# Patient Record
Sex: FEMALE | Race: WHITE | NOT HISPANIC OR LATINO | URBAN - METROPOLITAN AREA
[De-identification: names, ages, dates, MRNs, and addresses within clinical notes are randomized per-mention and may not be internally consistent; named-entity substitution may affect disease eponyms.]

---

## 2018-03-23 ENCOUNTER — EMERGENCY (EMERGENCY)
Facility: HOSPITAL | Age: 38
LOS: 0 days | Discharge: HOME | End: 2018-03-24
Attending: EMERGENCY MEDICINE | Admitting: EMERGENCY MEDICINE
Payer: SELF-PAY

## 2018-03-23 VITALS
TEMPERATURE: 98 F | DIASTOLIC BLOOD PRESSURE: 84 MMHG | WEIGHT: 115.08 LBS | RESPIRATION RATE: 19 BRPM | HEART RATE: 83 BPM | OXYGEN SATURATION: 100 % | HEIGHT: 64 IN | SYSTOLIC BLOOD PRESSURE: 139 MMHG

## 2018-03-23 DIAGNOSIS — L02.414 CUTANEOUS ABSCESS OF LEFT UPPER LIMB: ICD-10-CM

## 2018-03-23 PROCEDURE — 99284 EMERGENCY DEPT VISIT MOD MDM: CPT | Mod: 25

## 2018-03-23 PROCEDURE — 23930 I&D UPR A/E DP ABSC/HMTMA: CPT | Mod: LT

## 2018-03-23 NOTE — ED ADULT NURSE NOTE - CHIEF COMPLAINT QUOTE
Pt reports to ED complaining of left arm swelling and redness. Pt reports that it began Tuesday and has been getting worse since then.

## 2018-03-24 RX ORDER — CEPHALEXIN 500 MG
1 CAPSULE ORAL
Qty: 20 | Refills: 0
Start: 2018-03-24 | End: 2018-03-28

## 2018-03-24 RX ORDER — AZTREONAM 2 G
1 VIAL (EA) INJECTION
Qty: 10 | Refills: 0
Start: 2018-03-24 | End: 2018-03-28

## 2018-03-24 NOTE — ED PROVIDER NOTE - SKIN, MLM
+ erythematous hot 4  x 4 cm abscess post left forearm no crepitus no sign compartment syncdrome fluctant

## 2018-03-24 NOTE — ED PROVIDER NOTE - OBJECTIVE STATEMENT
pt presents with left forearm swelling erythmatous abscess x 3 days nrom affted arm and digitis she is intermittent ivda . no prior tx. denies fever. denies HA or neck pain. no chest pain or sob. no abd pain. no n/v/d. no urinary f/u/d. no back pain. no motor or sensory deficits. + illicit drug use. no recent travel. + abscess. no other acute issues symptoms or concerns

## 2018-04-29 ENCOUNTER — EMERGENCY (EMERGENCY)
Facility: HOSPITAL | Age: 38
LOS: 0 days | Discharge: LEFT BEFORE DISCHARGE | End: 2018-04-29
Attending: EMERGENCY MEDICINE | Admitting: EMERGENCY MEDICINE
Payer: SELF-PAY

## 2018-04-29 VITALS
TEMPERATURE: 98 F | WEIGHT: 115.08 LBS | SYSTOLIC BLOOD PRESSURE: 185 MMHG | DIASTOLIC BLOOD PRESSURE: 115 MMHG | HEART RATE: 61 BPM | RESPIRATION RATE: 20 BRPM | OXYGEN SATURATION: 100 %

## 2018-04-29 VITALS
DIASTOLIC BLOOD PRESSURE: 102 MMHG | HEART RATE: 60 BPM | SYSTOLIC BLOOD PRESSURE: 145 MMHG | OXYGEN SATURATION: 100 % | RESPIRATION RATE: 18 BRPM

## 2018-04-29 DIAGNOSIS — R07.9 CHEST PAIN, UNSPECIFIED: ICD-10-CM

## 2018-04-29 DIAGNOSIS — Z98.51 TUBAL LIGATION STATUS: Chronic | ICD-10-CM

## 2018-04-29 DIAGNOSIS — F17.210 NICOTINE DEPENDENCE, CIGARETTES, UNCOMPLICATED: ICD-10-CM

## 2018-04-29 LAB
BASOPHILS # BLD AUTO: 0.02 K/UL — SIGNIFICANT CHANGE UP (ref 0–0.2)
BASOPHILS NFR BLD AUTO: 0.2 % — SIGNIFICANT CHANGE UP (ref 0–2)
EOSINOPHIL # BLD AUTO: 0.04 K/UL — SIGNIFICANT CHANGE UP (ref 0–0.5)
EOSINOPHIL NFR BLD AUTO: 0.4 % — SIGNIFICANT CHANGE UP (ref 0–6)
HCT VFR BLD CALC: 44.9 % — SIGNIFICANT CHANGE UP (ref 34.5–45)
HGB BLD-MCNC: 15.5 G/DL — SIGNIFICANT CHANGE UP (ref 11.5–15.5)
IMM GRANULOCYTES NFR BLD AUTO: 0.3 % — SIGNIFICANT CHANGE UP (ref 0–1.5)
LYMPHOCYTES # BLD AUTO: 1.15 K/UL — SIGNIFICANT CHANGE UP (ref 1–3.3)
LYMPHOCYTES # BLD AUTO: 11 % — LOW (ref 13–44)
MCHC RBC-ENTMCNC: 29.4 PG — SIGNIFICANT CHANGE UP (ref 27–34)
MCHC RBC-ENTMCNC: 34.5 GM/DL — SIGNIFICANT CHANGE UP (ref 32–36)
MCV RBC AUTO: 85 FL — SIGNIFICANT CHANGE UP (ref 80–100)
MONOCYTES # BLD AUTO: 0.63 K/UL — SIGNIFICANT CHANGE UP (ref 0–0.9)
MONOCYTES NFR BLD AUTO: 6 % — SIGNIFICANT CHANGE UP (ref 2–14)
NEUTROPHILS # BLD AUTO: 8.58 K/UL — HIGH (ref 1.8–7.4)
NEUTROPHILS NFR BLD AUTO: 82.1 % — HIGH (ref 43–77)
NRBC # BLD: 0 /100 WBCS — SIGNIFICANT CHANGE UP (ref 0–0)
PLATELET # BLD AUTO: 303 K/UL — SIGNIFICANT CHANGE UP (ref 150–400)
RBC # BLD: 5.28 M/UL — HIGH (ref 3.8–5.2)
RBC # FLD: 13.5 % — SIGNIFICANT CHANGE UP (ref 10.3–14.5)
WBC # BLD: 10.45 K/UL — SIGNIFICANT CHANGE UP (ref 3.8–10.5)
WBC # FLD AUTO: 10.45 K/UL — SIGNIFICANT CHANGE UP (ref 3.8–10.5)

## 2018-04-29 PROCEDURE — 93010 ELECTROCARDIOGRAM REPORT: CPT

## 2018-04-29 PROCEDURE — 71046 X-RAY EXAM CHEST 2 VIEWS: CPT | Mod: 26

## 2018-04-29 PROCEDURE — 99284 EMERGENCY DEPT VISIT MOD MDM: CPT

## 2018-04-29 RX ORDER — HYDROMORPHONE HYDROCHLORIDE 2 MG/ML
1 INJECTION INTRAMUSCULAR; INTRAVENOUS; SUBCUTANEOUS ONCE
Qty: 0 | Refills: 0 | Status: DISCONTINUED | OUTPATIENT
Start: 2018-04-29 | End: 2018-04-29

## 2018-04-29 RX ORDER — ASPIRIN/CALCIUM CARB/MAGNESIUM 324 MG
325 TABLET ORAL ONCE
Qty: 0 | Refills: 0 | Status: COMPLETED | OUTPATIENT
Start: 2018-04-29 | End: 2018-04-29

## 2018-04-29 RX ORDER — SODIUM CHLORIDE 9 MG/ML
1000 INJECTION INTRAMUSCULAR; INTRAVENOUS; SUBCUTANEOUS ONCE
Qty: 0 | Refills: 0 | Status: COMPLETED | OUTPATIENT
Start: 2018-04-29 | End: 2018-04-29

## 2018-04-29 RX ORDER — OXYCODONE AND ACETAMINOPHEN 5; 325 MG/1; MG/1
1 TABLET ORAL ONCE
Qty: 0 | Refills: 0 | Status: DISCONTINUED | OUTPATIENT
Start: 2018-04-29 | End: 2018-04-29

## 2018-04-29 RX ADMIN — HYDROMORPHONE HYDROCHLORIDE 1 MILLIGRAM(S): 2 INJECTION INTRAMUSCULAR; INTRAVENOUS; SUBCUTANEOUS at 13:03

## 2018-04-29 RX ADMIN — HYDROMORPHONE HYDROCHLORIDE 1 MILLIGRAM(S): 2 INJECTION INTRAMUSCULAR; INTRAVENOUS; SUBCUTANEOUS at 12:01

## 2018-04-29 RX ADMIN — SODIUM CHLORIDE 1000 MILLILITER(S): 9 INJECTION INTRAMUSCULAR; INTRAVENOUS; SUBCUTANEOUS at 13:30

## 2018-04-29 RX ADMIN — HYDROMORPHONE HYDROCHLORIDE 1 MILLIGRAM(S): 2 INJECTION INTRAMUSCULAR; INTRAVENOUS; SUBCUTANEOUS at 15:16

## 2018-04-29 RX ADMIN — HYDROMORPHONE HYDROCHLORIDE 1 MILLIGRAM(S): 2 INJECTION INTRAMUSCULAR; INTRAVENOUS; SUBCUTANEOUS at 15:25

## 2018-04-29 RX ADMIN — Medication 325 MILLIGRAM(S): at 09:50

## 2018-04-29 RX ADMIN — HYDROMORPHONE HYDROCHLORIDE 1 MILLIGRAM(S): 2 INJECTION INTRAMUSCULAR; INTRAVENOUS; SUBCUTANEOUS at 12:38

## 2018-04-29 NOTE — ED ADULT NURSE REASSESSMENT NOTE - NS ED NURSE REASSESS COMMENT FT1
Laboratory confirms that IV RN gave bloodwork to phlebotomy.  Laboratory confirms that pt will be redrawn by phlebotomy at this time.  MD aware.

## 2018-04-29 NOTE — ED ADULT NURSE NOTE - OBJECTIVE STATEMENT
Pt reports waking up with the feeling of chest pain/pressure. Pt denies any recent fever/chills. Reports recent dental work. Pt offered ordered pain medication, but reports that percocet will not work for this pain and requests different medication. MD notified and will admin as ordered. Pt lab work sent, but IVL difficult to obtain. Second RN attempting IV placement at this time.

## 2018-04-29 NOTE — ED ADULT NURSE REASSESSMENT NOTE - NS ED NURSE REASSESS COMMENT FT1
Laboratory called back to say pt was being drawn at the bedside. Pt states that she had repeat labs drawn. Laboratory called to check, on progress and will call back with information to RN.

## 2018-04-29 NOTE — ED ADULT NURSE REASSESSMENT NOTE - NS ED NURSE REASSESS COMMENT FT1
MD also aware of pt's hypertension with pt also verbalizing awareness of htn. Pt cont to say that she is unable to stay and will leave AMA understanding the risks related to leaving prior to full evaluation and assessment.  Pt states she will f/u with endocrinologist and PMD tomorrow.

## 2018-04-29 NOTE — ED PROVIDER NOTE - PROGRESS NOTE DETAILS
Jamie Novak DO (Attending): received patient as sign out, awaiting lab results.  States she wants to go home and called a ride.  Discussed r/b/a including risk to health, disability, and death.  Expressed understanding will DC AMA Jamie Novak DO (Attending): received patient as sign out, awaiting lab results.  States she wants to go home and called a ride.  Lab called about results, states they never received any specimens.  Patient states blood was drawn 3 times today and yet lab states they still do not have a sample.  Discussed r/b/a including risk to health, disability, and death.  Expressed understanding will COY KAYE

## 2018-04-29 NOTE — ED PROVIDER NOTE - OBJECTIVE STATEMENT
38 y/o female with PMHx of Lupus, s/p tubal ligation heavy CP radiating to neck x1 hr. No prior episodes. No cardiac hx. Smoker. No EtOH. No illicit drugs. Cardiac FHx; mother cardiomyopathy under 50. Denies heavy lifting, trauma, recent travel. No aggravating, relieving factors. Did not take ASA PTA. PCP Brea Beal.

## 2018-04-29 NOTE — ED ADULT TRIAGE NOTE - CHIEF COMPLAINT QUOTE
Pt complains of chest pain, states it "feels like my heart is pounding out of my throat and like somebody's sitting on my chest," x 1 hour.   BP elevated on arrival.  Pt reports 4 teeth extracted over past several days.

## 2018-04-29 NOTE — ED ADULT NURSE REASSESSMENT NOTE - NS ED NURSE REASSESS COMMENT FT1
Several RN's to bedside with unsuccessful IV attempt with MD aware. IV team able to get IV with sono with labs sent.  Pt medicated for pain and will monitor for relief.

## 2018-04-29 NOTE — ED ADULT NURSE REASSESSMENT NOTE - NS ED NURSE REASSESS COMMENT FT1
MD notified that pt needs to leave at this time. MD spoke with pt regarding risks of leaving AMA and pt verbalizes understanding. MD notified that laboratory states that there is no current bloodwork that can be resulted on patient. Pt notified that there is no bloodwork that will be able to resulted. Pt states she is unable to wait for redraw or results and verbalizes understanding that she will have no bloodwork results if she leaves AMA. MD spoke with pt regarding the laboratory being unable to result any bloodwork.  Pt continues to verbalize understanding of risks related to leaving AMA with MD to bedside to speak with pt. Pt ambulating with steady gait, resp even and unlabored, and pt reports mild relief with last medication. Pt IVL removed.

## 2020-06-23 ENCOUNTER — INPATIENT (INPATIENT)
Facility: HOSPITAL | Age: 40
LOS: 37 days | Discharge: NURSING, REHAB OR LONG-TERM CARE | DRG: 4 | End: 2020-07-31
Attending: INTERNAL MEDICINE | Admitting: HOSPITALIST
Payer: MEDICAID

## 2020-06-23 VITALS
RESPIRATION RATE: 18 BRPM | SYSTOLIC BLOOD PRESSURE: 112 MMHG | DIASTOLIC BLOOD PRESSURE: 73 MMHG | HEART RATE: 122 BPM | HEIGHT: 65 IN | TEMPERATURE: 100 F | WEIGHT: 119.93 LBS | OXYGEN SATURATION: 100 %

## 2020-06-23 DIAGNOSIS — L03.113 CELLULITIS OF RIGHT UPPER LIMB: ICD-10-CM

## 2020-06-23 DIAGNOSIS — R64 CACHEXIA: ICD-10-CM

## 2020-06-23 DIAGNOSIS — R53.2 FUNCTIONAL QUADRIPLEGIA: ICD-10-CM

## 2020-06-23 DIAGNOSIS — J80 ACUTE RESPIRATORY DISTRESS SYNDROME: ICD-10-CM

## 2020-06-23 DIAGNOSIS — R19.00 INTRA-ABDOMINAL AND PELVIC SWELLING, MASS AND LUMP, UNSPECIFIED SITE: ICD-10-CM

## 2020-06-23 DIAGNOSIS — E86.0 DEHYDRATION: ICD-10-CM

## 2020-06-23 DIAGNOSIS — R65.21 SEVERE SEPSIS WITH SEPTIC SHOCK: ICD-10-CM

## 2020-06-23 DIAGNOSIS — E16.2 HYPOGLYCEMIA, UNSPECIFIED: ICD-10-CM

## 2020-06-23 DIAGNOSIS — F11.24 OPIOID DEPENDENCE WITH OPIOID-INDUCED MOOD DISORDER: ICD-10-CM

## 2020-06-23 DIAGNOSIS — M86.60 OTHER CHRONIC OSTEOMYELITIS, UNSPECIFIED SITE: ICD-10-CM

## 2020-06-23 DIAGNOSIS — E88.09 OTHER DISORDERS OF PLASMA-PROTEIN METABOLISM, NOT ELSEWHERE CLASSIFIED: ICD-10-CM

## 2020-06-23 DIAGNOSIS — G92 TOXIC ENCEPHALOPATHY: ICD-10-CM

## 2020-06-23 DIAGNOSIS — E87.2 ACIDOSIS: ICD-10-CM

## 2020-06-23 DIAGNOSIS — Z88.5 ALLERGY STATUS TO NARCOTIC AGENT: ICD-10-CM

## 2020-06-23 DIAGNOSIS — L97.928 NON-PRESSURE CHRONIC ULCER OF UNSPECIFIED PART OF LEFT LOWER LEG WITH OTHER SPECIFIED SEVERITY: ICD-10-CM

## 2020-06-23 DIAGNOSIS — L97.918 NON-PRESSURE CHRONIC ULCER OF UNSPECIFIED PART OF RIGHT LOWER LEG WITH OTHER SPECIFIED SEVERITY: ICD-10-CM

## 2020-06-23 DIAGNOSIS — Z98.51 TUBAL LIGATION STATUS: Chronic | ICD-10-CM

## 2020-06-23 DIAGNOSIS — L89.892 PRESSURE ULCER OF OTHER SITE, STAGE 2: ICD-10-CM

## 2020-06-23 DIAGNOSIS — R94.31 ABNORMAL ELECTROCARDIOGRAM [ECG] [EKG]: ICD-10-CM

## 2020-06-23 DIAGNOSIS — I73.01 RAYNAUD'S SYNDROME WITH GANGRENE: ICD-10-CM

## 2020-06-23 DIAGNOSIS — L03.114 CELLULITIS OF LEFT UPPER LIMB: ICD-10-CM

## 2020-06-23 DIAGNOSIS — T45.1X6A UNDERDOSING OF ANTINEOPLASTIC AND IMMUNOSUPPRESSIVE DRUGS, INITIAL ENCOUNTER: ICD-10-CM

## 2020-06-23 DIAGNOSIS — M06.9 RHEUMATOID ARTHRITIS, UNSPECIFIED: ICD-10-CM

## 2020-06-23 DIAGNOSIS — F32.9 MAJOR DEPRESSIVE DISORDER, SINGLE EPISODE, UNSPECIFIED: ICD-10-CM

## 2020-06-23 DIAGNOSIS — N39.0 URINARY TRACT INFECTION, SITE NOT SPECIFIED: ICD-10-CM

## 2020-06-23 DIAGNOSIS — R74.0 NONSPECIFIC ELEVATION OF LEVELS OF TRANSAMINASE AND LACTIC ACID DEHYDROGENASE [LDH]: ICD-10-CM

## 2020-06-23 DIAGNOSIS — E43 UNSPECIFIED SEVERE PROTEIN-CALORIE MALNUTRITION: ICD-10-CM

## 2020-06-23 DIAGNOSIS — G40.909 EPILEPSY, UNSPECIFIED, NOT INTRACTABLE, WITHOUT STATUS EPILEPTICUS: ICD-10-CM

## 2020-06-23 DIAGNOSIS — B37.7 CANDIDAL SEPSIS: ICD-10-CM

## 2020-06-23 DIAGNOSIS — L89.152 PRESSURE ULCER OF SACRAL REGION, STAGE 2: ICD-10-CM

## 2020-06-23 DIAGNOSIS — D69.6 THROMBOCYTOPENIA, UNSPECIFIED: ICD-10-CM

## 2020-06-23 DIAGNOSIS — F41.9 ANXIETY DISORDER, UNSPECIFIED: ICD-10-CM

## 2020-06-23 DIAGNOSIS — N18.9 CHRONIC KIDNEY DISEASE, UNSPECIFIED: ICD-10-CM

## 2020-06-23 DIAGNOSIS — L89.891 PRESSURE ULCER OF OTHER SITE, STAGE 1: ICD-10-CM

## 2020-06-23 DIAGNOSIS — A41.02 SEPSIS DUE TO METHICILLIN RESISTANT STAPHYLOCOCCUS AUREUS: ICD-10-CM

## 2020-06-23 DIAGNOSIS — F17.210 NICOTINE DEPENDENCE, CIGARETTES, UNCOMPLICATED: ICD-10-CM

## 2020-06-23 DIAGNOSIS — N17.0 ACUTE KIDNEY FAILURE WITH TUBULAR NECROSIS: ICD-10-CM

## 2020-06-23 DIAGNOSIS — D62 ACUTE POSTHEMORRHAGIC ANEMIA: ICD-10-CM

## 2020-06-23 DIAGNOSIS — E87.6 HYPOKALEMIA: ICD-10-CM

## 2020-06-23 DIAGNOSIS — Z91.120 PATIENT'S INTENTIONAL UNDERDOSING OF MEDICATION REGIMEN DUE TO FINANCIAL HARDSHIP: ICD-10-CM

## 2020-06-23 DIAGNOSIS — M32.9 SYSTEMIC LUPUS ERYTHEMATOSUS, UNSPECIFIED: ICD-10-CM
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
## 2020-06-23 DIAGNOSIS — R62.7 ADULT FAILURE TO THRIVE: ICD-10-CM

## 2020-06-23 DIAGNOSIS — G62.81 CRITICAL ILLNESS POLYNEUROPATHY: ICD-10-CM

## 2020-06-23 DIAGNOSIS — J69.0 PNEUMONITIS DUE TO INHALATION OF FOOD AND VOMIT: ICD-10-CM

## 2020-06-23 DIAGNOSIS — E87.0 HYPEROSMOLALITY AND HYPERNATREMIA: ICD-10-CM
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
## 2020-06-23 LAB
ALBUMIN SERPL ELPH-MCNC: 1.1 G/DL — LOW (ref 3.3–5)
ALP SERPL-CCNC: 359 U/L — HIGH (ref 40–120)
ALT FLD-CCNC: 37 U/L — SIGNIFICANT CHANGE UP (ref 12–78)
ANION GAP SERPL CALC-SCNC: 10 MMOL/L — SIGNIFICANT CHANGE UP (ref 5–17)
APTT BLD: 34.5 SEC — SIGNIFICANT CHANGE UP (ref 27.5–36.3)
AST SERPL-CCNC: 93 U/L — HIGH (ref 15–37)
BILIRUB SERPL-MCNC: 1.2 MG/DL — SIGNIFICANT CHANGE UP (ref 0.2–1.2)
BUN SERPL-MCNC: 54 MG/DL — HIGH (ref 7–23)
CALCIUM SERPL-MCNC: 7.9 MG/DL — LOW (ref 8.5–10.1)
CHLORIDE SERPL-SCNC: 102 MMOL/L — SIGNIFICANT CHANGE UP (ref 96–108)
CO2 SERPL-SCNC: 22 MMOL/L — SIGNIFICANT CHANGE UP (ref 22–31)
CREAT SERPL-MCNC: 2.56 MG/DL — HIGH (ref 0.5–1.3)
GLUCOSE SERPL-MCNC: 55 MG/DL — LOW (ref 70–99)
INR BLD: 1.3 RATIO — HIGH (ref 0.88–1.16)
LACTATE SERPL-SCNC: 3.9 MMOL/L — HIGH (ref 0.7–2)
POTASSIUM SERPL-MCNC: 4.6 MMOL/L — SIGNIFICANT CHANGE UP (ref 3.5–5.3)
POTASSIUM SERPL-SCNC: 4.6 MMOL/L — SIGNIFICANT CHANGE UP (ref 3.5–5.3)
PROT SERPL-MCNC: 6.1 GM/DL — SIGNIFICANT CHANGE UP (ref 6–8.3)
PROTHROM AB SERPL-ACNC: 14.5 SEC — HIGH (ref 10–12.9)
SODIUM SERPL-SCNC: 134 MMOL/L — LOW (ref 135–145)

## 2020-06-23 PROCEDURE — 71045 X-RAY EXAM CHEST 1 VIEW: CPT | Mod: 26,77

## 2020-06-23 PROCEDURE — 71045 X-RAY EXAM CHEST 1 VIEW: CPT | Mod: 26

## 2020-06-23 RX ORDER — PIPERACILLIN AND TAZOBACTAM 4; .5 G/20ML; G/20ML
3.38 INJECTION, POWDER, LYOPHILIZED, FOR SOLUTION INTRAVENOUS ONCE
Refills: 0 | Status: COMPLETED | OUTPATIENT
Start: 2020-06-23 | End: 2020-06-23

## 2020-06-23 RX ORDER — SODIUM CHLORIDE 9 MG/ML
1000 INJECTION INTRAMUSCULAR; INTRAVENOUS; SUBCUTANEOUS ONCE
Refills: 0 | Status: COMPLETED | OUTPATIENT
Start: 2020-06-23 | End: 2020-06-23

## 2020-06-23 RX ORDER — CEFTRIAXONE 500 MG/1
1000 INJECTION, POWDER, FOR SOLUTION INTRAMUSCULAR; INTRAVENOUS ONCE
Refills: 0 | Status: DISCONTINUED | OUTPATIENT
Start: 2020-06-23 | End: 2020-06-23

## 2020-06-23 RX ORDER — ACETAMINOPHEN 500 MG
650 TABLET ORAL ONCE
Refills: 0 | Status: COMPLETED | OUTPATIENT
Start: 2020-06-23 | End: 2020-06-23

## 2020-06-23 RX ORDER — VANCOMYCIN HCL 1 G
750 VIAL (EA) INTRAVENOUS ONCE
Refills: 0 | Status: COMPLETED | OUTPATIENT
Start: 2020-06-23 | End: 2020-06-23

## 2020-06-23 RX ADMIN — Medication 250 MILLIGRAM(S): at 23:31

## 2020-06-23 RX ADMIN — PIPERACILLIN AND TAZOBACTAM 200 GRAM(S): 4; .5 INJECTION, POWDER, LYOPHILIZED, FOR SOLUTION INTRAVENOUS at 22:52

## 2020-06-23 RX ADMIN — PIPERACILLIN AND TAZOBACTAM 3.38 GRAM(S): 4; .5 INJECTION, POWDER, LYOPHILIZED, FOR SOLUTION INTRAVENOUS at 23:20

## 2020-06-23 RX ADMIN — Medication 650 MILLIGRAM(S): at 22:40

## 2020-06-23 RX ADMIN — SODIUM CHLORIDE 1000 MILLILITER(S): 9 INJECTION INTRAMUSCULAR; INTRAVENOUS; SUBCUTANEOUS at 22:29

## 2020-06-23 RX ADMIN — SODIUM CHLORIDE 1000 MILLILITER(S): 9 INJECTION INTRAMUSCULAR; INTRAVENOUS; SUBCUTANEOUS at 23:30

## 2020-06-23 NOTE — ED PROVIDER NOTE - SECONDARY DIAGNOSIS.
Cellulitis, unspecified cellulitis site Anemia, unspecified type Renal insufficiency Drug abuse and dependence

## 2020-06-23 NOTE — ED PROVIDER NOTE - CONSTITUTIONAL, MLM
normal... Pale appearing, awake, alert, oriented to person, place, time/situation and in no apparent distress. Unkempt.

## 2020-06-23 NOTE — ED PROVIDER NOTE - NS_ ATTENDINGSCRIBEDETAILS _ED_A_ED_FT
I, David Rodarte MD,  performed the initial face to face bedside interview with this patient regarding history of present illness, review of symptoms and relevant past medical, social and family history.  I completed an independent physical examination.    The history, relevant review of systems, past medical and surgical history, medical decision making, and physical examination was documented by the scribe in my presence and I attest to the accuracy of the documentation.

## 2020-06-23 NOTE — ED PROCEDURE NOTE - CPROC ED INFUS LINE DETAIL1
Ultrasound guidance was used during placement. The location was identified, and the area was draped and prepped./The guidewire was recovered./The catheter was placed using sterile technique./Ultrasound guidance was used during placement./All lumen(s) aspirated and flushed without difficulty.

## 2020-06-23 NOTE — ED ADULT TRIAGE NOTE - CHIEF COMPLAINT QUOTE
Patient complaining of weakness x 3 days, fever and cough.  Hx of lupus and IVDA, last heroin use this AM.

## 2020-06-23 NOTE — ED PROVIDER NOTE - SKIN, MLM
Skin normal color for race, warm, dry and intact. No evidence of rash. BL 1+ edema to lower extremities. New and old excoriations throughout body. cellulitis to the right 5th toe. BL upper extremities wrapped in old dirty guaze

## 2020-06-23 NOTE — ED PROVIDER NOTE - CARE PLAN
Principal Discharge DX:	Sepsis, due to unspecified organism, unspecified whether acute organ dysfunction present  Secondary Diagnosis:	Cellulitis, unspecified cellulitis site Principal Discharge DX:	Sepsis, due to unspecified organism, unspecified whether acute organ dysfunction present  Secondary Diagnosis:	Cellulitis, unspecified cellulitis site  Secondary Diagnosis:	Anemia, unspecified type  Secondary Diagnosis:	Renal insufficiency  Secondary Diagnosis:	Drug abuse and dependence

## 2020-06-23 NOTE — ED PROVIDER NOTE - OBJECTIVE STATEMENT
38 y/o female with pmhx of lupus presents to the ED c/o weakness. Pt hasn't seen a dr in about 2 years. Gets frequent infections. Pt states she hasn't took her medications in three months. Last heroin use was this morning. +fever 40 y/o female with pmhx of lupus presents to the ED c/o weakness. Pt hasn't seen a dr in about 2 years. Gets frequent infections. Pt states she hasn't took her medications in three months. Past four months pt has been abusing drugs. She states she had a recent death in the family and relapsed last use: heroine this morning. +fever

## 2020-06-23 NOTE — ED ADULT NURSE NOTE - OBJECTIVE STATEMENT
pt presents to ED c/o swelling of upper and lower extremities, unable walk, generalized weakness, fever, cough, shortness of breath on exertion. pt hx lupous, RA, raynauds, epilepsy (has not had seizure in 8yrs), depression. IV drug use. wounds to R forearm, index and middle. wounds to L forearm, index and ring finger.

## 2020-06-23 NOTE — ED ADULT NURSE REASSESSMENT NOTE - NS ED NURSE REASSESS COMMENT FT1
delay in obtaining labs, IV access due to poor veins. Multiple RNs attempted to insert IV, unsuccessful. Dr. Rodarte attempted EJ twice, unsuccessful. Dr. Rodarte inserted central line

## 2020-06-23 NOTE — ED PROVIDER NOTE - PROGRESS NOTE DETAILS
rectal exam with Margarita YEE at bedside - brown stool guiaic negative (lot [address], exp 2/28/21) case d/w Aram and will admit with bridge but DNM.   pt with improved VS after IVF bolus.

## 2020-06-23 NOTE — ED ADULT NURSE NOTE - NSIMPLEMENTINTERV_GEN_ALL_ED
Implemented All Fall Risk Interventions:  [place] to call system. Call bell, personal items and telephone within reach. Instruct patient to call for assistance. Room bathroom lighting operational. Non-slip footwear when patient is off stretcher. Physically safe environment: no spills, clutter or unnecessary equipment. Stretcher in lowest position, wheels locked, appropriate side rails in place. Provide visual cue, wrist band, yellow gown, etc. Monitor gait and stability. Monitor for mental status changes and reorient to person, place, and time. Review medications for side effects contributing to fall risk. Reinforce activity limits and safety measures with patient and family.

## 2020-06-24 DIAGNOSIS — A41.9 SEPSIS, UNSPECIFIED ORGANISM: ICD-10-CM

## 2020-06-24 DIAGNOSIS — Z87.19 PERSONAL HISTORY OF OTHER DISEASES OF THE DIGESTIVE SYSTEM: Chronic | ICD-10-CM

## 2020-06-24 DIAGNOSIS — K80.20 CALCULUS OF GALLBLADDER WITHOUT CHOLECYSTITIS WITHOUT OBSTRUCTION: Chronic | ICD-10-CM

## 2020-06-24 LAB
ALBUMIN SERPL ELPH-MCNC: 0.8 G/DL — LOW (ref 3.3–5)
ALP SERPL-CCNC: 298 U/L — HIGH (ref 40–120)
ALT FLD-CCNC: 27 U/L — SIGNIFICANT CHANGE UP (ref 12–78)
ANION GAP SERPL CALC-SCNC: 10 MMOL/L — SIGNIFICANT CHANGE UP (ref 5–17)
APPEARANCE UR: ABNORMAL
AST SERPL-CCNC: 73 U/L — HIGH (ref 15–37)
BASOPHILS # BLD AUTO: 0 K/UL — SIGNIFICANT CHANGE UP (ref 0–0.2)
BASOPHILS NFR BLD AUTO: 0 % — SIGNIFICANT CHANGE UP (ref 0–2)
BILIRUB SERPL-MCNC: 1.4 MG/DL — HIGH (ref 0.2–1.2)
BILIRUB UR-MCNC: NEGATIVE — SIGNIFICANT CHANGE UP
BUN SERPL-MCNC: 48 MG/DL — HIGH (ref 7–23)
CALCIUM SERPL-MCNC: 7.2 MG/DL — LOW (ref 8.5–10.1)
CHLORIDE SERPL-SCNC: 104 MMOL/L — SIGNIFICANT CHANGE UP (ref 96–108)
CO2 SERPL-SCNC: 20 MMOL/L — LOW (ref 22–31)
COLOR SPEC: ABNORMAL
CREAT SERPL-MCNC: 2.31 MG/DL — HIGH (ref 0.5–1.3)
DIFF PNL FLD: ABNORMAL
EOSINOPHIL # BLD AUTO: 0.01 K/UL — SIGNIFICANT CHANGE UP (ref 0–0.5)
EOSINOPHIL NFR BLD AUTO: 0.1 % — SIGNIFICANT CHANGE UP (ref 0–6)
GLUCOSE SERPL-MCNC: 68 MG/DL — LOW (ref 70–99)
GLUCOSE UR QL: NEGATIVE MG/DL — SIGNIFICANT CHANGE UP
GRAM STN FLD: SIGNIFICANT CHANGE UP
HCT VFR BLD CALC: 21.9 % — LOW (ref 34.5–45)
HGB BLD-MCNC: 7.4 G/DL — LOW (ref 11.5–15.5)
HIV 1+2 AB+HIV1 P24 AG SERPL QL IA: SIGNIFICANT CHANGE UP
IMM GRANULOCYTES NFR BLD AUTO: 4.7 % — HIGH (ref 0–1.5)
KETONES UR-MCNC: NEGATIVE — SIGNIFICANT CHANGE UP
LACTATE SERPL-SCNC: 3.5 MMOL/L — HIGH (ref 0.7–2)
LACTATE SERPL-SCNC: 4.2 MMOL/L — CRITICAL HIGH (ref 0.7–2)
LACTATE SERPL-SCNC: 4.6 MMOL/L — CRITICAL HIGH (ref 0.7–2)
LACTATE SERPL-SCNC: 5.5 MMOL/L — CRITICAL HIGH (ref 0.7–2)
LEUKOCYTE ESTERASE UR-ACNC: ABNORMAL
LYMPHOCYTES # BLD AUTO: 0.32 K/UL — LOW (ref 1–3.3)
LYMPHOCYTES # BLD AUTO: 4 % — LOW (ref 13–44)
MAGNESIUM SERPL-MCNC: 1.1 MG/DL — LOW (ref 1.6–2.6)
MCHC RBC-ENTMCNC: 29.6 PG — SIGNIFICANT CHANGE UP (ref 27–34)
MCHC RBC-ENTMCNC: 33.8 GM/DL — SIGNIFICANT CHANGE UP (ref 32–36)
MCV RBC AUTO: 87.6 FL — SIGNIFICANT CHANGE UP (ref 80–100)
METHOD TYPE: SIGNIFICANT CHANGE UP
MONOCYTES # BLD AUTO: 0.1 K/UL — SIGNIFICANT CHANGE UP (ref 0–0.9)
MONOCYTES NFR BLD AUTO: 1.2 % — LOW (ref 2–14)
MRSA SPEC QL CULT: SIGNIFICANT CHANGE UP
NEUTROPHILS # BLD AUTO: 7.24 K/UL — SIGNIFICANT CHANGE UP (ref 1.8–7.4)
NEUTROPHILS NFR BLD AUTO: 90 % — HIGH (ref 43–77)
NITRITE UR-MCNC: NEGATIVE — SIGNIFICANT CHANGE UP
PCP SPEC-MCNC: SIGNIFICANT CHANGE UP
PH UR: 5 — SIGNIFICANT CHANGE UP (ref 5–8)
PHOSPHATE SERPL-MCNC: 3.3 MG/DL — SIGNIFICANT CHANGE UP (ref 2.5–4.5)
PLATELET # BLD AUTO: 100 K/UL — LOW (ref 150–400)
POTASSIUM SERPL-MCNC: 4.8 MMOL/L — SIGNIFICANT CHANGE UP (ref 3.5–5.3)
POTASSIUM SERPL-SCNC: 4.8 MMOL/L — SIGNIFICANT CHANGE UP (ref 3.5–5.3)
PROT SERPL-MCNC: 4.8 GM/DL — LOW (ref 6–8.3)
PROT UR-MCNC: 30 MG/DL
RBC # BLD: 2.5 M/UL — LOW (ref 3.8–5.2)
RBC # FLD: 18.2 % — HIGH (ref 10.3–14.5)
SARS-COV-2 RNA SPEC QL NAA+PROBE: SIGNIFICANT CHANGE UP
SODIUM SERPL-SCNC: 134 MMOL/L — LOW (ref 135–145)
SP GR SPEC: 1.01 — SIGNIFICANT CHANGE UP (ref 1.01–1.02)
SPECIMEN SOURCE: SIGNIFICANT CHANGE UP
SPECIMEN SOURCE: SIGNIFICANT CHANGE UP
UROBILINOGEN FLD QL: 1 MG/DL
WBC # BLD: 8.05 K/UL — SIGNIFICANT CHANGE UP (ref 3.8–10.5)
WBC # FLD AUTO: 8.05 K/UL — SIGNIFICANT CHANGE UP (ref 3.8–10.5)

## 2020-06-24 PROCEDURE — 86140 C-REACTIVE PROTEIN: CPT

## 2020-06-24 PROCEDURE — 87181 SC STD AGAR DILUTION PER AGT: CPT

## 2020-06-24 PROCEDURE — 83605 ASSAY OF LACTIC ACID: CPT

## 2020-06-24 PROCEDURE — 86160 COMPLEMENT ANTIGEN: CPT

## 2020-06-24 PROCEDURE — 36430 TRANSFUSION BLD/BLD COMPNT: CPT

## 2020-06-24 PROCEDURE — 71045 X-RAY EXAM CHEST 1 VIEW: CPT

## 2020-06-24 PROCEDURE — 92610 EVALUATE SWALLOWING FUNCTION: CPT | Mod: GN

## 2020-06-24 PROCEDURE — 93306 TTE W/DOPPLER COMPLETE: CPT

## 2020-06-24 PROCEDURE — 86901 BLOOD TYPING SEROLOGIC RH(D): CPT

## 2020-06-24 PROCEDURE — 86923 COMPATIBILITY TEST ELECTRIC: CPT

## 2020-06-24 PROCEDURE — 87070 CULTURE OTHR SPECIMN AEROBIC: CPT

## 2020-06-24 PROCEDURE — L8699: CPT

## 2020-06-24 PROCEDURE — 84100 ASSAY OF PHOSPHORUS: CPT

## 2020-06-24 PROCEDURE — 97164 PT RE-EVAL EST PLAN CARE: CPT | Mod: GP

## 2020-06-24 PROCEDURE — 87186 SC STD MICRODIL/AGAR DIL: CPT

## 2020-06-24 PROCEDURE — 84300 ASSAY OF URINE SODIUM: CPT

## 2020-06-24 PROCEDURE — 36415 COLL VENOUS BLD VENIPUNCTURE: CPT

## 2020-06-24 PROCEDURE — 74176 CT ABD & PELVIS W/O CONTRAST: CPT

## 2020-06-24 PROCEDURE — 87635 SARS-COV-2 COVID-19 AMP PRB: CPT

## 2020-06-24 PROCEDURE — 94003 VENT MGMT INPAT SUBQ DAY: CPT

## 2020-06-24 PROCEDURE — 80074 ACUTE HEPATITIS PANEL: CPT

## 2020-06-24 PROCEDURE — 73610 X-RAY EXAM OF ANKLE: CPT | Mod: 50

## 2020-06-24 PROCEDURE — 81025 URINE PREGNANCY TEST: CPT

## 2020-06-24 PROCEDURE — 87040 BLOOD CULTURE FOR BACTERIA: CPT

## 2020-06-24 PROCEDURE — 80048 BASIC METABOLIC PNL TOTAL CA: CPT

## 2020-06-24 PROCEDURE — 80202 ASSAY OF VANCOMYCIN: CPT

## 2020-06-24 PROCEDURE — 84156 ASSAY OF PROTEIN URINE: CPT

## 2020-06-24 PROCEDURE — 92526 ORAL FUNCTION THERAPY: CPT | Mod: GN

## 2020-06-24 PROCEDURE — 87086 URINE CULTURE/COLONY COUNT: CPT

## 2020-06-24 PROCEDURE — 86225 DNA ANTIBODY NATIVE: CPT

## 2020-06-24 PROCEDURE — 97116 GAIT TRAINING THERAPY: CPT | Mod: GP

## 2020-06-24 PROCEDURE — 71250 CT THORAX DX C-: CPT

## 2020-06-24 PROCEDURE — 85027 COMPLETE CBC AUTOMATED: CPT

## 2020-06-24 PROCEDURE — 80053 COMPREHEN METABOLIC PANEL: CPT

## 2020-06-24 PROCEDURE — 70450 CT HEAD/BRAIN W/O DYE: CPT

## 2020-06-24 PROCEDURE — 82803 BLOOD GASES ANY COMBINATION: CPT

## 2020-06-24 PROCEDURE — 93320 DOPPLER ECHO COMPLETE: CPT

## 2020-06-24 PROCEDURE — 80307 DRUG TEST PRSMV CHEM ANLYZR: CPT

## 2020-06-24 PROCEDURE — 97530 THERAPEUTIC ACTIVITIES: CPT | Mod: GP

## 2020-06-24 PROCEDURE — 86900 BLOOD TYPING SEROLOGIC ABO: CPT

## 2020-06-24 PROCEDURE — 83615 LACTATE (LD) (LDH) ENZYME: CPT

## 2020-06-24 PROCEDURE — 92524 BEHAVRAL QUALIT ANALYS VOICE: CPT | Mod: GN

## 2020-06-24 PROCEDURE — 93010 ELECTROCARDIOGRAM REPORT: CPT

## 2020-06-24 PROCEDURE — 85730 THROMBOPLASTIN TIME PARTIAL: CPT

## 2020-06-24 PROCEDURE — 86850 RBC ANTIBODY SCREEN: CPT

## 2020-06-24 PROCEDURE — 36600 WITHDRAWAL OF ARTERIAL BLOOD: CPT

## 2020-06-24 PROCEDURE — 74018 RADEX ABDOMEN 1 VIEW: CPT

## 2020-06-24 PROCEDURE — 83735 ASSAY OF MAGNESIUM: CPT

## 2020-06-24 PROCEDURE — 82570 ASSAY OF URINE CREATININE: CPT

## 2020-06-24 PROCEDURE — 84484 ASSAY OF TROPONIN QUANT: CPT

## 2020-06-24 PROCEDURE — 85610 PROTHROMBIN TIME: CPT

## 2020-06-24 PROCEDURE — 86431 RHEUMATOID FACTOR QUANT: CPT

## 2020-06-24 PROCEDURE — 85652 RBC SED RATE AUTOMATED: CPT

## 2020-06-24 PROCEDURE — P9016: CPT

## 2020-06-24 PROCEDURE — 93325 DOPPLER ECHO COLOR FLOW MAPG: CPT

## 2020-06-24 PROCEDURE — 12345: CPT | Mod: NC

## 2020-06-24 PROCEDURE — 99223 1ST HOSP IP/OBS HIGH 75: CPT

## 2020-06-24 PROCEDURE — 93005 ELECTROCARDIOGRAM TRACING: CPT

## 2020-06-24 PROCEDURE — 84702 CHORIONIC GONADOTROPIN TEST: CPT

## 2020-06-24 PROCEDURE — 99221 1ST HOSP IP/OBS SF/LOW 40: CPT

## 2020-06-24 PROCEDURE — 97162 PT EVAL MOD COMPLEX 30 MIN: CPT | Mod: GP

## 2020-06-24 PROCEDURE — U0003: CPT

## 2020-06-24 PROCEDURE — 83880 ASSAY OF NATRIURETIC PEPTIDE: CPT

## 2020-06-24 PROCEDURE — C9113: CPT

## 2020-06-24 PROCEDURE — P9047: CPT

## 2020-06-24 PROCEDURE — 82962 GLUCOSE BLOOD TEST: CPT

## 2020-06-24 PROCEDURE — 85025 COMPLETE CBC W/AUTO DIFF WBC: CPT

## 2020-06-24 PROCEDURE — 87150 DNA/RNA AMPLIFIED PROBE: CPT

## 2020-06-24 PROCEDURE — 97110 THERAPEUTIC EXERCISES: CPT | Mod: GP

## 2020-06-24 PROCEDURE — 87389 HIV-1 AG W/HIV-1&-2 AB AG IA: CPT

## 2020-06-24 PROCEDURE — 93306 TTE W/DOPPLER COMPLETE: CPT | Mod: 26

## 2020-06-24 PROCEDURE — 94002 VENT MGMT INPAT INIT DAY: CPT

## 2020-06-24 PROCEDURE — 93312 ECHO TRANSESOPHAGEAL: CPT

## 2020-06-24 PROCEDURE — 76700 US EXAM ABDOM COMPLETE: CPT

## 2020-06-24 PROCEDURE — 94760 N-INVAS EAR/PLS OXIMETRY 1: CPT

## 2020-06-24 PROCEDURE — 99497 ADVNCD CARE PLAN 30 MIN: CPT | Mod: 25

## 2020-06-24 PROCEDURE — 81001 URINALYSIS AUTO W/SCOPE: CPT

## 2020-06-24 PROCEDURE — 93925 LOWER EXTREMITY STUDY: CPT

## 2020-06-24 RX ORDER — CEFTRIAXONE 500 MG/1
1000 INJECTION, POWDER, FOR SOLUTION INTRAMUSCULAR; INTRAVENOUS EVERY 24 HOURS
Refills: 0 | Status: DISCONTINUED | OUTPATIENT
Start: 2020-06-24 | End: 2020-06-26

## 2020-06-24 RX ORDER — SODIUM CHLORIDE 9 MG/ML
1000 INJECTION, SOLUTION INTRAVENOUS ONCE
Refills: 0 | Status: COMPLETED | OUTPATIENT
Start: 2020-06-24 | End: 2020-06-24

## 2020-06-24 RX ORDER — SODIUM CHLORIDE 9 MG/ML
1000 INJECTION, SOLUTION INTRAVENOUS
Refills: 0 | Status: DISCONTINUED | OUTPATIENT
Start: 2020-06-24 | End: 2020-06-24

## 2020-06-24 RX ORDER — COLLAGENASE CLOSTRIDIUM HIST. 250 UNIT/G
1 OINTMENT (GRAM) TOPICAL DAILY
Refills: 0 | Status: DISCONTINUED | OUTPATIENT
Start: 2020-06-24 | End: 2020-07-31

## 2020-06-24 RX ORDER — ACETAMINOPHEN 500 MG
650 TABLET ORAL EVERY 4 HOURS
Refills: 0 | Status: DISCONTINUED | OUTPATIENT
Start: 2020-06-24 | End: 2020-07-31

## 2020-06-24 RX ORDER — VANCOMYCIN HCL 1 G
750 VIAL (EA) INTRAVENOUS EVERY 24 HOURS
Refills: 0 | Status: DISCONTINUED | OUTPATIENT
Start: 2020-06-24 | End: 2020-06-26

## 2020-06-24 RX ORDER — SODIUM CHLORIDE 9 MG/ML
1000 INJECTION INTRAMUSCULAR; INTRAVENOUS; SUBCUTANEOUS
Refills: 0 | Status: DISCONTINUED | OUTPATIENT
Start: 2020-06-24 | End: 2020-06-25

## 2020-06-24 RX ORDER — MAGNESIUM SULFATE 500 MG/ML
2 VIAL (ML) INJECTION ONCE
Refills: 0 | Status: COMPLETED | OUTPATIENT
Start: 2020-06-24 | End: 2020-06-24

## 2020-06-24 RX ORDER — PIPERACILLIN AND TAZOBACTAM 4; .5 G/20ML; G/20ML
3.38 INJECTION, POWDER, LYOPHILIZED, FOR SOLUTION INTRAVENOUS EVERY 8 HOURS
Refills: 0 | Status: DISCONTINUED | OUTPATIENT
Start: 2020-06-24 | End: 2020-06-24

## 2020-06-24 RX ORDER — ONDANSETRON 8 MG/1
4 TABLET, FILM COATED ORAL EVERY 6 HOURS
Refills: 0 | Status: DISCONTINUED | OUTPATIENT
Start: 2020-06-24 | End: 2020-06-27

## 2020-06-24 RX ORDER — VANCOMYCIN HCL 1 G
750 VIAL (EA) INTRAVENOUS EVERY 12 HOURS
Refills: 0 | Status: DISCONTINUED | OUTPATIENT
Start: 2020-06-24 | End: 2020-06-24

## 2020-06-24 RX ADMIN — Medication 0.5 MILLIGRAM(S): at 11:01

## 2020-06-24 RX ADMIN — CEFTRIAXONE 1000 MILLIGRAM(S): 500 INJECTION, POWDER, FOR SOLUTION INTRAMUSCULAR; INTRAVENOUS at 12:28

## 2020-06-24 RX ADMIN — Medication 50 GRAM(S): at 17:36

## 2020-06-24 RX ADMIN — PIPERACILLIN AND TAZOBACTAM 25 GRAM(S): 4; .5 INJECTION, POWDER, LYOPHILIZED, FOR SOLUTION INTRAVENOUS at 06:03

## 2020-06-24 RX ADMIN — SODIUM CHLORIDE 100 MILLILITER(S): 9 INJECTION, SOLUTION INTRAVENOUS at 12:28

## 2020-06-24 RX ADMIN — SODIUM CHLORIDE 2000 MILLILITER(S): 9 INJECTION, SOLUTION INTRAVENOUS at 05:58

## 2020-06-24 RX ADMIN — SODIUM CHLORIDE 100 MILLILITER(S): 9 INJECTION, SOLUTION INTRAVENOUS at 21:51

## 2020-06-24 RX ADMIN — Medication 1 APPLICATION(S): at 18:48

## 2020-06-24 RX ADMIN — SODIUM CHLORIDE 2000 MILLILITER(S): 9 INJECTION, SOLUTION INTRAVENOUS at 02:57

## 2020-06-24 RX ADMIN — SODIUM CHLORIDE 1000 MILLILITER(S): 9 INJECTION INTRAMUSCULAR; INTRAVENOUS; SUBCUTANEOUS at 10:04

## 2020-06-24 RX ADMIN — SODIUM CHLORIDE 1000 MILLILITER(S): 9 INJECTION, SOLUTION INTRAVENOUS at 04:38

## 2020-06-24 RX ADMIN — Medication 0.5 MILLIGRAM(S): at 22:16

## 2020-06-24 RX ADMIN — Medication 650 MILLIGRAM(S): at 21:51

## 2020-06-24 RX ADMIN — Medication 250 MILLIGRAM(S): at 18:46

## 2020-06-24 RX ADMIN — Medication 2 MILLIGRAM(S): at 00:38

## 2020-06-24 NOTE — H&P ADULT - HISTORY OF PRESENT ILLNESS
Pt is a 38 yo female with a pmh/o lupus, RA, reynards, epilepsy (last seizure 8 yrs ago), IVDA (clean for 4 years), who presented to ED today due to weakness. Pt states that her grandmother  approx. 3 months ago and for the past almost 2 months she has been snorting two bags of heroin a day. Pt states her heroin use is partially due to depression and anxiety since gma passed away however also due to financial reasons as can no longer afford oxycodone which she had been taking for her lupus/RA chronic pain as Rx. Pt states she has wounds all over her body from falls, ulcers from previous attempted injection sites, which are not healing and bleed (approx 2 tbs (RUE wound)) daily. Pt describes weakness as fatigue, endorses sob with exertion, weight loss due to decreased appetite from depression and drug use, palpitations. Denies n/v/d, constipation, HA, hematemesis, hematochezia, dark stools, abd pain, cp, fevers, cough, leg swelling.     In ED pt found to have lactate 3.9 with Hgb of 5.5, , T 100.4.

## 2020-06-24 NOTE — ADVANCED PRACTICE NURSE CONSULT - ASSESSMENT
Pt is a 40 yo female with a pmh/o lupus, RA, reynards, epilepsy (last seizure 8 yrs ago), IVDA (clean for 4 years), who presented to ED today due to weakness. Pt states that her grandmother  approx. 3 months ago and for the past almost 2 months she has been snorting two bags of heroin a day. Pt states her heroin use is partially due to depression and anxiety since gma passed away however also due to financial reasons as can no longer afford oxycodone which she had been taking for her lupus/RA chronic pain as Rx. Pt states she has wounds all over her body from falls, ulcers from previous attempted injection sites, which are not healing and bleed (approx 2 tbs (RUE wound)) daily. Pt describes weakness as fatigue, endorses sob with exertion, weight loss due to decreased appetite from depression and drug use, palpitations. Denies n/v/d, constipation, HA, hematemesis, hematochezia, dark stools, abd pain, cp, fevers, cough, leg swelling.     Consulted to evaluate patients upper extremity wounds and pressure injury to coccyx that was present on admission. Patient presents resting on a [employer] Sport mattress on her backside with both heels elevated off mattress. Patient weak looking, undernourished, unkempt and is unable to move extremities independently When she is moved, she reports increased pain. Reports she has been like this for the past 4 days. Both upper extremity dressings removed. Extremities with edema and multiple old, scarred wounds track marks from IVDU. Wounds noted to both dorsal aspects of hands and are tender when irrigating with normal saline. No odor noted. Wound clean. Periwounds with old scars. Extremities warm to touch with positive radial pulses. Silavdene ordered for wounds and will be applied by RN when it arrives from pharmacy. Foam dressings placed over wounds as cover dressings.     Patient turned to her right side with 2 people turning her over as she is too weak to move herself. Coccyx assessed to have an evolving. DTI measuring [id number].  Wound with 80% purple discolored intact tissue with 20% necrotic slough. No odor, periwound intact. Wound irrigated with normal saline. Collagenase ordered for enzymatic debridement and wound healing. Will be applied by RN when it arrives from pharmacy. Foam dressing applied as cover dressing. Patient positioned to her right side with a pillow after assessment and treatment completed. Both heels remain elevated off mattress.

## 2020-06-24 NOTE — CONSULT NOTE ADULT - ASSESSMENT
40 y/o female with h/o SLE, RA, Reynauld syndrome, epilepsy (last seizure 8 yrs ago), IVDA (heroin) was admitted on  for increased weakness. Pt states that her grandmother  approx. 3 months ago and for the past almost 2 months she has been snorting two bags of heroin a day. Pt states her heroin use is partially due to depression and anxiety since gma passed away however also due to financial reasons as can no longer afford oxycodone which she had been taking for her lupus/RA chronic pain as Rx. Pt states she has wounds all over her body from falls, ulcers from previous attempted injection sites, which are not healing and bleed at times. Pt describes weakness as fatigue, endorses sob with exertion, weight loss due to decreased appetite, palpitations. Denies fever, cough. In ER she was found with low grade fever and received vancomycin IV and zosyn.     1. Low grade fever. Possible sepsis. Skin ulcers ?track marks. Probable arms cellulitis. Renal failure ?acute vs chronic  -obtain BC x 2  -given vancomycin and zosyn IV  -start doxycycline 100 mg PO q12h and ceftriaxone 1 gm IV qd  -reason for abx use and side effects reviewed with patient; monitor BMP   -old chart reviewed to assess prior cultures  -monitor temps  -f/u CBC  -supportive care  2. Other issues:   -care per medicine

## 2020-06-24 NOTE — H&P ADULT - NSICDXFAMILYHX_GEN_ALL_CORE_FT
FAMILY HISTORY:  No pertinent family history in first degree relatives, cannot recall family history at this time

## 2020-06-24 NOTE — H&P ADULT - NSICDXPASTMEDICALHX_GEN_ALL_CORE_FT
PAST MEDICAL HISTORY:  H/O Raynaud's syndrome     Heroin abuse     Lupus     Rheumatoid arthritis     Smoker

## 2020-06-24 NOTE — H&P ADULT - ASSESSMENT
38 yo female with current heroin abuse (snorting) admitted with:    Cellulitis complicated by sepsis in setting of IVDA, multiple sites  -admit to med surg  -broad spectrum abx  -blood cultures  -given IVDA, TTE ordered to r/o bacteremia  -Wound care consult, multiple stage 2-3 wounds throughout  -turn and repostition q 4 hrs  -fall precautions  -SW consult, declined psych  -HIV, hepatitis panel (transaminitis noted), pt consent obtained verbally  -pt a smoker, declined patch  -ID consult  -hold chemical AC due to anemia, VCD boots for DVT ppx  -pt declining methadone to prevent withdrawal, states understanding of increased risk of seizure and worsening of condition and accepts this at this time, states will d/w day attending about pain/WD regimen, would like to think about it.     Symptomatic anemia  -transfuse for goal >7  -bleeding due to weeping wounds for >3 weeks, approx.  -h/h q 6 hrs once transfusion completed  -guiac neg  -iron studies as outpt, pt recieving PRBCs at this time    Protein calorie malnutrition with hypoglycemia  -POC STAT, 55 on serum chemistry  -prealbumin  -nutritional supplement ordered tid with meals  -PO intake promoted due to visible cachexia  -nutrition consult  -ca corrected with albumin 10.22    NATALIA  -gentle hydration  -avoid nephrotoxic medications  -monitor renal function on serum chemistry  -i/o's    Lupus  -previously on oxycodone 5mg for chronic pain, now using heroin as could no longer afford  -declines further opioid use at this time  -tylenol prn for pain    Anxiety/Depression  -consider psych consult, declined  -SW consulted for IVDA and depression  -no Suicidal or homicidal ideations   -ativan 2mg ivp x 1 now, pt exhibiting s/s panic attack, resolving with redirection     RA  -no longer on MTX due to financial reasons  -supportive care  -consider restarting MTX q wk once clinically stable    Epilepsy  -last seizure >8 yrs ago  -seizure precautions  -given no longer on opioids, at higher risk for seizure, will order q 8 neurochecks

## 2020-06-24 NOTE — ADVANCED PRACTICE NURSE CONSULT - RECOMMEDATIONS
1) Turn and position every 2 hours  2) Continue to elevate heels off mattress  3) Change dressings to upper extremities daily with silvadene and foam dressings  4) Change dressing to coccyx with collagenase and foam dressing daily  5) Albumin-0.8 on 6/24/2020. Nutrition consult pending.

## 2020-06-24 NOTE — H&P ADULT - SKIN COMMENTS
multiple ulcers throughout upper extremities with unstageable to stage 2 noted, stage 2 to coccyx noted, stage 1 to left hallux noted

## 2020-06-24 NOTE — H&P ADULT - ATTENDING COMMENTS
18 min spent discussing goals of care and advanced care planning with patient who states she is full code and wants to live however does not have a proxy at this time.

## 2020-06-24 NOTE — CONSULT NOTE ADULT - SUBJECTIVE AND OBJECTIVE BOX
Patient is a 39y old  Female who presents with a chief complaint of Cellulitis with sepsis, symptomatic anemia.     HPI:  38 y/o female with h/o SLE, RA, Reynauld syndrome, epilepsy (last seizure 8 yrs ago), IVDA (heroin) was admitted on  for increased weakness. Pt states that her grandmother  approx. 3 months ago and for the past almost 2 months she has been snorting two bags of heroin a day. Pt states her heroin use is partially due to depression and anxiety since gma passed away however also due to financial reasons as can no longer afford oxycodone which she had been taking for her lupus/RA chronic pain as Rx. Pt states she has wounds all over her body from falls, ulcers from previous attempted injection sites, which are not healing and bleed at times. Pt describes weakness as fatigue, endorses sob with exertion, weight loss due to decreased appetite, palpitations. Denies fever, cough. In ER she was found with low grade fever and received vancomycin IV and zosyn.      PMH: as above  PSH: as above  Meds: per reconciliation sheet, noted below  MEDICATIONS  (STANDING):  lactated ringers. 1000 milliLiter(s) (100 mL/Hr) IV Continuous <Continuous>  piperacillin/tazobactam IVPB.. 3.375 Gram(s) IV Intermittent every 8 hours  sodium chloride 0.9%. 1000 milliLiter(s) (1000 mL/Hr) IV Continuous <Continuous>  vancomycin  IVPB 750 milliGRAM(s) IV Intermittent every 12 hours    MEDICATIONS  (PRN):  acetaminophen   Tablet .. 650 milliGRAM(s) Oral every 4 hours PRN Temp greater or equal to 38C (100.4F), Mild Pain (1 - 3)  LORazepam     Tablet 0.5 milliGRAM(s) Oral every 8 hours PRN Agitation  ondansetron Injectable 4 milliGRAM(s) IV Push every 6 hours PRN Nausea    Allergies    morphine (Unknown)    Intolerances      Social: no smoking, no alcohol, no illegal drugs; no recent travel, no exposure to TB  FAMILY HISTORY:  No pertinent family history in first degree relatives: cannot recall family history at this time    no history of premature cardiovascular disease in first degree relatives    ROS: the patient is poorly verbal; denies fever, no chills, no HA, no seizures, no dizziness, no sore throat, no nasal congestion, no blurry vision, no CP, no palpitations, no SOB, no cough, no abdominal pain, no diarrhea, no N/V, no dysuria, no joint aches, no rectal pain or bleeding, no night sweats; had body aches  All other systems reviewed and are negative    Vital Signs Last 24 Hrs  T(C): 36.3 (2020 07:03), Max: 37.9 (2020 20:37)  T(F): 97.4 (2020 07:03), Max: 100.3 (2020 20:37)  HR: 90 (2020 09:00) (89 - 129)  BP: 99/55 (2020 09:00) (88/50 - 121/78)  BP(mean): 65 (2020 09:00) (65 - 78)  RR: 16 (2020 09:00) (15 - 19)  SpO2: 100% (2020 09:00) (99% - 100%)  Daily Height in cm: 165.1 (:37)    Daily     PE:    Constitutional:  No acute distress  HEENT: NC/AT, EOMI, PERRLA, conjunctivae clear; ears and nose atraumatic; pharynx benign  Neck: supple; thyroid not palpable  Back: no tenderness  Respiratory: respiratory effort normal; clear to auscultation  Cardiovascular: S1S2 regular, no murmurs  Abdomen: soft, not tender, not distended, positive BS; no liver or spleen organomegaly  Genitourinary: no suprapubic tenderness  Lymphatic: no LN palpable  Musculoskeletal: no muscle tenderness, no joint swelling or tenderness; multiple contusions  Extremities: no pedal edema  Neurological/ Psychiatric: AxOx3, judgement and insight normal; moving all extremities  Skin: multiple skin ulcers and rashes; no drainage    Labs: all available labs reviewed                        7.4    8.05  )-----------( 100      ( 2020 07:05 )             21.9     06-24    134<L>  |  104  |  48<H>  ----------------------------<  68<L>  4.8   |  20<L>  |  2.31<H>    Ca    7.2<L>      2020 07:05  Phos  3.3     06-24  Mg     1.1     06-24    TPro  4.8<L>  /  Alb  0.8<L>  /  TBili  1.4<H>  /  DBili  x   /  AST  73<H>  /  ALT  27  /  AlkPhos  298<H>  06-24     LIVER FUNCTIONS - ( 2020 07:05 )  Alb: 0.8 g/dL / Pro: 4.8 gm/dL / ALK PHOS: 298 U/L / ALT: 27 U/L / AST: 73 U/L / GGT: x           Urinalysis Basic - ( 2020 05:41 )    Color: Ale / Appearance: Slightly Turbid / S.010 / pH: x  Gluc: x / Ketone: Negative  / Bili: Negative / Urobili: 1 mg/dL   Blood: x / Protein: 30 mg/dL / Nitrite: Negative   Leuk Esterase: Moderate / RBC: 11-25 /HPF / WBC 3-5   Sq Epi: x / Non Sq Epi: Negative / Bacteria: Many      Radiology: all available radiological tests reviewed    Advanced directives addressed: full resuscitation

## 2020-06-24 NOTE — CHART NOTE - NSCHARTNOTEFT_GEN_A_CORE
40 y/o female with h/o SLE, RA, Reynauld syndrome, epilepsy (last seizure 8 yrs ago), IVDA (heroin) was admitted on 6/24 for increased weakness, fatigue, endorses sob with exertion, weight loss due to decreased appetite, palpitations. Denies fever, cough. In ER she was found with low grade fever and received vancomycin IV and zosyn. In the ED, Hgb was 5.5 with lactate of 3.5. ID was consulted for positive blood cultures , gram positive cocci in clusters and she was started on Vanco and Ceftriaxone. Pt received 5L fluid and on maintenance fluid of lactate ringers.     Called by RN to evaluate pt with lactate of 5.5    Vital Signs  T(F): 97.6 (24 Jun 2020 22:04), Max: 98.2 (24 Jun 2020 03:44)  HR: 105 (24 Jun 2020 22:00) (89 - 109)  BP: 109/58 (24 Jun 2020 22:00) (86/55 - 109/58)  BP(mean): 70 (24 Jun 2020 22:00) (60 - 81)  RR: 15 (24 Jun 2020 22:00) (15 - 22)  SpO2: 100% (24 Jun 2020 22:00) (100% - 100%)    Sepsis due to cellulitis  -S/p 5L IV fluids  -Will hold LR for now and repeat lactate in 3 hours  -Continue with tele monitors  -Monitor VS, if further drop in bp will obtain ICU consult 40 y/o female with h/o SLE, RA, Reynauld syndrome, epilepsy (last seizure 8 yrs ago), IVDA (heroin) was admitted on 6/24 for increased weakness, fatigue, endorses sob with exertion, weight loss due to decreased appetite, palpitations. Denies fever, cough. In ER she was found with low grade fever and received vancomycin IV and zosyn. In the ED, Hgb was 5.5 with lactate of 3.5. ID was consulted for positive blood cultures , gram positive cocci in clusters and she was started on Vanco and Ceftriaxone. Pt received 5L fluid and now on maintenance fluid with lactate ringers.     Called by RN to evaluate pt with lactate of 5.5    Vital Signs  T(F): 97.6 (24 Jun 2020 22:04), Max: 98.2 (24 Jun 2020 03:44)  HR: 105 (24 Jun 2020 22:00) (89 - 109)  BP: 109/58 (24 Jun 2020 22:00) (86/55 - 109/58)  BP(mean): 70 (24 Jun 2020 22:00) (60 - 81)  RR: 15 (24 Jun 2020 22:00) (15 - 22)  SpO2: 100% (24 Jun 2020 22:00) (100% - 100%)    Sepsis due to cellulitis  VSS, afebrile   -Will hold LR for now  -Trial of NS bolus 500 ml   -Trend lactate   -Continue with tele monitors  -Monitor VS, if further drop in bp will obtain ICU consult  -Continue with IV antibiotics    Case discussed with Dr. Grey 40 y/o female with h/o SLE, RA, Reynauld syndrome, epilepsy (last seizure 8 yrs ago), IVDA (heroin) was admitted on 6/24 for increased weakness, fatigue, endorses sob with exertion, weight loss due to decreased appetite, palpitations. Denies fever, cough. In ER she was found with low grade fever and received vancomycin IV and zosyn. In the ED, Hgb was 5.5 with lactate of 3.5. ID was consulted for positive blood cultures , gram positive cocci in clusters and she was started on Vanco and Ceftriaxone. Pt received 5L fluid and now on maintenance fluid with lactate ringers.     Called by RN to evaluate pt with lactate of 5.5    Vital Signs  T(F): 97.6 (24 Jun 2020 22:04), Max: 98.2 (24 Jun 2020 03:44)  HR: 105 (24 Jun 2020 22:00) (89 - 109)  BP: 109/58 (24 Jun 2020 22:00) (86/55 - 109/58)  BP(mean): 70 (24 Jun 2020 22:00) (60 - 81)  RR: 15 (24 Jun 2020 22:00) (15 - 22)  SpO2: 100% (24 Jun 2020 22:00) (100% - 100%)    Gen: alert and awake  Cardiac: S1 s2 regular  Lungs: good air entry b/l  Abd: +BS, soft NT  Ext: + edema with generalized tenderness  Skin: multiple wounds     Sepsis due to cellulitis  VSS, afebrile   -Will hold LR for now  -Trial of NS bolus 500 ml   -Trend lactate   -Continue with tele monitors  -Monitor VS, if further drop in bp will obtain ICU consult  -Continue with IV antibiotics  -ID f/u in am     Case discussed with Dr. Grey

## 2020-06-24 NOTE — CHART NOTE - NSCHARTNOTEFT_GEN_A_CORE
Due to increased lactate and hypotension despite IVF resuscitation, case discussed with ICU PA. Will continue with current management as recommended: IVF resuscitation, PRBC, abx. Will upgrade to CICU. D/w ED RN. Additional bolus ordered. Total received: 2500cc LR 1unit PRBC

## 2020-06-25 LAB
ANION GAP SERPL CALC-SCNC: 10 MMOL/L — SIGNIFICANT CHANGE UP (ref 5–17)
APPEARANCE UR: ABNORMAL
BILIRUB UR-MCNC: NEGATIVE — SIGNIFICANT CHANGE UP
BUN SERPL-MCNC: 53 MG/DL — HIGH (ref 7–23)
CALCIUM SERPL-MCNC: 7.5 MG/DL — LOW (ref 8.5–10.1)
CHLORIDE SERPL-SCNC: 104 MMOL/L — SIGNIFICANT CHANGE UP (ref 96–108)
CO2 SERPL-SCNC: 20 MMOL/L — LOW (ref 22–31)
COLOR SPEC: ABNORMAL
CREAT ?TM UR-MCNC: 62 MG/DL — SIGNIFICANT CHANGE UP
CREAT SERPL-MCNC: 2.41 MG/DL — HIGH (ref 0.5–1.3)
DIFF PNL FLD: ABNORMAL
GLUCOSE SERPL-MCNC: 59 MG/DL — LOW (ref 70–99)
GLUCOSE UR QL: NEGATIVE MG/DL — SIGNIFICANT CHANGE UP
HAV IGM SER-ACNC: SIGNIFICANT CHANGE UP
HBV CORE IGM SER-ACNC: SIGNIFICANT CHANGE UP
HBV SURFACE AG SER-ACNC: SIGNIFICANT CHANGE UP
HCT VFR BLD CALC: 21.9 % — LOW (ref 34.5–45)
HCT VFR BLD CALC: 23.5 % — LOW (ref 34.5–45)
HCV AB S/CO SERPL IA: 0.29 S/CO — SIGNIFICANT CHANGE UP (ref 0–0.99)
HCV AB SERPL-IMP: SIGNIFICANT CHANGE UP
HGB BLD-MCNC: 7.4 G/DL — LOW (ref 11.5–15.5)
HGB BLD-MCNC: 8.1 G/DL — LOW (ref 11.5–15.5)
KETONES UR-MCNC: NEGATIVE — SIGNIFICANT CHANGE UP
LACTATE SERPL-SCNC: 4.4 MMOL/L — CRITICAL HIGH (ref 0.7–2)
LACTATE SERPL-SCNC: 4.6 MMOL/L — CRITICAL HIGH (ref 0.7–2)
LEUKOCYTE ESTERASE UR-ACNC: ABNORMAL
MAGNESIUM SERPL-MCNC: 1.7 MG/DL — SIGNIFICANT CHANGE UP (ref 1.6–2.6)
MCHC RBC-ENTMCNC: 29.6 PG — SIGNIFICANT CHANGE UP (ref 27–34)
MCHC RBC-ENTMCNC: 30 PG — SIGNIFICANT CHANGE UP (ref 27–34)
MCHC RBC-ENTMCNC: 33.8 GM/DL — SIGNIFICANT CHANGE UP (ref 32–36)
MCHC RBC-ENTMCNC: 34.5 GM/DL — SIGNIFICANT CHANGE UP (ref 32–36)
MCV RBC AUTO: 87 FL — SIGNIFICANT CHANGE UP (ref 80–100)
MCV RBC AUTO: 87.6 FL — SIGNIFICANT CHANGE UP (ref 80–100)
NITRITE UR-MCNC: NEGATIVE — SIGNIFICANT CHANGE UP
PH UR: 5 — SIGNIFICANT CHANGE UP (ref 5–8)
PLATELET # BLD AUTO: 71 K/UL — LOW (ref 150–400)
PLATELET # BLD AUTO: 74 K/UL — LOW (ref 150–400)
POTASSIUM SERPL-MCNC: 4.5 MMOL/L — SIGNIFICANT CHANGE UP (ref 3.5–5.3)
POTASSIUM SERPL-SCNC: 4.5 MMOL/L — SIGNIFICANT CHANGE UP (ref 3.5–5.3)
PROT ?TM UR-MCNC: 120 MG/DL — HIGH (ref 0–12)
PROT UR-MCNC: 30 MG/DL
PROT/CREAT UR-RTO: 1.9 RATIO — HIGH (ref 0–0.2)
RBC # BLD: 2.5 M/UL — LOW (ref 3.8–5.2)
RBC # BLD: 2.7 M/UL — LOW (ref 3.8–5.2)
RBC # FLD: 19.2 % — HIGH (ref 10.3–14.5)
RBC # FLD: 19.2 % — HIGH (ref 10.3–14.5)
SODIUM SERPL-SCNC: 134 MMOL/L — LOW (ref 135–145)
SP GR SPEC: 1.01 — SIGNIFICANT CHANGE UP (ref 1.01–1.02)
UROBILINOGEN FLD QL: NEGATIVE MG/DL — SIGNIFICANT CHANGE UP
WBC # BLD: 11.72 K/UL — HIGH (ref 3.8–10.5)
WBC # BLD: 12.86 K/UL — HIGH (ref 3.8–10.5)
WBC # FLD AUTO: 11.72 K/UL — HIGH (ref 3.8–10.5)
WBC # FLD AUTO: 12.86 K/UL — HIGH (ref 3.8–10.5)

## 2020-06-25 PROCEDURE — 99233 SBSQ HOSP IP/OBS HIGH 50: CPT

## 2020-06-25 PROCEDURE — 76700 US EXAM ABDOM COMPLETE: CPT | Mod: 26

## 2020-06-25 RX ORDER — SODIUM CHLORIDE 9 MG/ML
1000 INJECTION INTRAMUSCULAR; INTRAVENOUS; SUBCUTANEOUS
Refills: 0 | Status: DISCONTINUED | OUTPATIENT
Start: 2020-06-25 | End: 2020-06-27

## 2020-06-25 RX ORDER — SODIUM CHLORIDE 9 MG/ML
500 INJECTION INTRAMUSCULAR; INTRAVENOUS; SUBCUTANEOUS ONCE
Refills: 0 | Status: COMPLETED | OUTPATIENT
Start: 2020-06-25 | End: 2020-06-25

## 2020-06-25 RX ADMIN — Medication 250 MILLIGRAM(S): at 21:37

## 2020-06-25 RX ADMIN — Medication 1 APPLICATION(S): at 11:13

## 2020-06-25 RX ADMIN — SODIUM CHLORIDE 100 MILLILITER(S): 9 INJECTION INTRAMUSCULAR; INTRAVENOUS; SUBCUTANEOUS at 21:36

## 2020-06-25 RX ADMIN — Medication 1 APPLICATION(S): at 10:21

## 2020-06-25 RX ADMIN — SODIUM CHLORIDE 100 MILLILITER(S): 9 INJECTION INTRAMUSCULAR; INTRAVENOUS; SUBCUTANEOUS at 11:07

## 2020-06-25 RX ADMIN — CEFTRIAXONE 1000 MILLIGRAM(S): 500 INJECTION, POWDER, FOR SOLUTION INTRAMUSCULAR; INTRAVENOUS at 10:58

## 2020-06-25 RX ADMIN — SODIUM CHLORIDE 500 MILLILITER(S): 9 INJECTION INTRAMUSCULAR; INTRAVENOUS; SUBCUTANEOUS at 04:45

## 2020-06-25 RX ADMIN — Medication 0.5 MILLIGRAM(S): at 10:58

## 2020-06-25 RX ADMIN — Medication 0.5 MILLIGRAM(S): at 21:36

## 2020-06-25 NOTE — CONSULT NOTE ADULT - ASSESSMENT
38 yo female with hx of SLE, RA, ( formerly on MTX up to year ago - followed by Dr Del Rosario) , hx of IVDA and now heroin use  now presengint with weakness and found to be MRSA bacteremia and renal eval called for elevated creatinine    NATALIA vs NATALIA on CKD??    no previous labs ( has not seen physician > year)    hx of SLE and Raynaud's    Cr remans elevated in setting of bactermia and lactic acidosis    severe hypoalbuminemia noted  - chronic protein malnutrition vs renal losses    continue with gentle IVF - monitor for third spacing with severe hypoalbuminemia   check urine lytes, protein/cr ratio    renal imaging    SLE serologies to check current activity state    IV abx per ID/ cardio eval to r/o SBE    keep SBP > 110 for renal perfusion    protein supplementation      Thank you for the courtesy of this consult. We will follow this patient with you.   Management is subject to change if new information becomes available or patient condition changes. 40 yo female with hx of SLE, RA, ( formerly on MTX up to year ago - followed by Dr Del Rosario) , hx of IVDA and now heroin use  now presengint with weakness and found to be MRSA bacteremia and renal eval called for elevated creatinine    NATALIA vs NATALIA on CKD??     no previous labs ( has not seen physician > year)    multifactorial etiologies - hx of SLE and Raynaud's and in setting of bactaremia and lactic acidosis , cocaine use and chronic NSAID use    severe hypoalbuminemia noted  - chronic protein malnutrition vs renal/liver related    continue with gentle IVF - monitor for third spacing with severe hypoalbuminemia   check urine lytes, protein/cr ratio    renal imaging    SLE serologies to check current activity state    IV abx per ID/ cardio eval to r/o SBE    keep SBP > 110 for renal perfusion as able    oral protein supplementation      prognosis guareded  d/w CCU RN      Thank you for the courtesy of this consult. We will follow this patient with you.   Management is subject to change if new information becomes available or patient condition changes.

## 2020-06-25 NOTE — DIETITIAN INITIAL EVALUATION ADULT. - NUTRITIONGOAL OUTCOME1
Optimal po intake meeting >80% of ENN. Abstinence of non prescription drugs. Reduced s/s of malnutri

## 2020-06-25 NOTE — DIETITIAN INITIAL EVALUATION ADULT. - PERTINENT LABORATORY DATA
06-25 Na134 mmol/L<L> Glu 59 mg/dL<L> K+ 4.5 mmol/L Cr  2.41 mg/dL<H> BUN 53 mg/dL<H> Phos n/a   Alb n/a   PAB n/a

## 2020-06-25 NOTE — PROGRESS NOTE ADULT - SUBJECTIVE AND OBJECTIVE BOX
Pt is a 40 yo female with a pmh/o lupus, RA, reynards, epilepsy (last seizure 8 yrs ago), IVDA (clean for 4 years), who presented to ED today due to weakness. Pt states that her grandmother  approx. 3 months ago and for the past almost 2 months she has been snorting two bags of heroin a day. Pt states her heroin use is partially due to depression and anxiety since gma passed away however also due to financial reasons as can no longer afford oxycodone which she had been taking for her lupus/RA chronic pain as Rx. Pt states she has wounds all over her body from falls, ulcers from previous attempted injection sites, which are not healing and bleed (approx 2 tbs (RUE wound)) daily. Pt describes weakness as fatigue, endorses sob with exertion, weight loss due to decreased appetite from depression and drug use, palpitations. Denies n/v/d, constipation, HA, hematemesis, hematochezia, dark stools, abd pain, cp, fevers, cough, leg swelling.   In ED pt found to have lactate 3.9 with Hgb of 5.5, , T 100.4.         Vital Signs Last 24 Hrs  T(C): 36.3 (2020 12:28), Max: 36.8 (2020 00:58)  T(F): 97.4 (2020 12:28), Max: 98.3 (2020 00:58)  HR: 93 (2020 14:00) (83 - 121)  BP: 109/68 (2020 14:00) (91/50 - 117/71)  BP(mean): 77 (2020 14:00) (60 - 98)  RR: 15 (2020 14:00) (13 - 24)  SpO2: 100% (2020 14:00) (99% - 100%)        PHYSICAL EXAM:   · CONSTITUTIONAL: Pale appearing, awake, alert, oriented to person, place, time/situation and in no apparent distress. Unkempt.	  · ENMT: Airway patent, Nasal mucosa clear. Mouth with normal mucosa. Throat has no vesicles, no oropharyngeal exudates and uvula is midline.	  · EYES: Clear bilaterally, pupils equal, round and reactive to light.	  · CARDIAC: Normal rate, regular rhythm.  Heart sounds S1, S2.  No murmurs, rubs or gallops.	  · RESPIRATORY: Breath sounds clear and equal bilaterally.	  · GASTROINTESTINAL: Abdomen soft, non-tender, no guarding.	  · MUSCULOSKELETAL: Spine appears normal, range of motion is not limited, no muscle or joint tenderness	  · NEUROLOGICAL: Alert and oriented, no focal deficits, no motor or sensory deficits.	  · SKIN: Skin normal color for race, warm, dry and intact. No evidence of rash. BL 1+ edema to lower extremities. New and old excoriations throughout body. cellulitis to the right 5th toe. BL upper extremities wrapped in old dirty guaze	                            7.4    11.72 )-----------( 71       ( 2020 06:30 )             21.9     2020 06:30    134    |  104    |  53     ----------------------------<  59     4.5     |  20     |  2.41     Ca    7.5        2020 06:30  Phos  3.3       2020 07:05  Mg     1.7       2020 06:30    TPro  4.8    /  Alb  0.8    /  TBili  1.4    /  DBili  x      /  AST  73     /  ALT  27     /  AlkPhos  298    2020 07:05    LIVER FUNCTIONS - ( 2020 07:05 )  Alb: 0.8 g/dL / Pro: 4.8 gm/dL / ALK PHOS: 298 U/L / ALT: 27 U/L / AST: 73 U/L / GGT: x           PT/INR - ( 2020 22:22 )   PT: 14.5 sec;   INR: 1.30 ratio         PTT - ( 2020 22:22 )  PTT:34.5 sec  CAPILLARY BLOOD GLUCOSE      POCT Blood Glucose.: 73 mg/dL (2020 12:47)        Urinalysis Basic - ( 2020 05:41 )    Color: Ale / Appearance: Slightly Turbid / S.010 / pH: x  Gluc: x / Ketone: Negative  / Bili: Negative / Urobili: 1 mg/dL   Blood: x / Protein: 30 mg/dL / Nitrite: Negative   Leuk Esterase: Moderate / RBC: 11-25 /HPF / WBC 3-5   Sq Epi: x / Non Sq Epi: Negative / Bacteria: Many          MEDICATIONS  (STANDING):  cefTRIAXone Injectable. 1000 milliGRAM(s) IV Push every 24 hours  collagenase Ointment 1 Application(s) Topical daily  silver sulfADIAZINE 1% Cream 1 Application(s) Topical daily  sodium chloride 0.9%. 1000 milliLiter(s) (100 mL/Hr) IV Continuous <Continuous>  vancomycin  IVPB 750 milliGRAM(s) IV Intermittent every 24 hours    MEDICATIONS  (PRN):  acetaminophen   Tablet .. 650 milliGRAM(s) Oral every 4 hours PRN Temp greater or equal to 38C (100.4F), Mild Pain (1 - 3)  LORazepam     Tablet 0.5 milliGRAM(s) Oral every 8 hours PRN Agitation  ondansetron Injectable 4 milliGRAM(s) IV Push every 6 hours PRN Nausea            Assessment and Plan:   Assessment:  · Assessment		  40 yo female with current heroin abuse (snorting) admitted with:    Cellulitis complicated by sepsis in setting of IVDA, multiple sites  -broad spectrum abx as per ID  -Follow blood cultures  -Echo: no vegetations  -Wound care consult appreciated  -turn and repostition q 4 hrs  -fall precautions  -SW consult, declined psych  -pt a smoker, declined patch  -ID consult appreciated  -hold chemical AC due to anemia, VCD boots for DVT ppx  -Follow lactate in am    Symptomatic anemia-acute blood loss anmeia  -S/P 2 units of PRBCs, follow H/H  -bleeding due to weeping wounds for >3 weeks, approx.  -guiac neg      Severe Protein calorie malnutrition with hypoglycemia  -Nutrition eval apprecaited  -Encourage po intake    NATALIA  -Continue hydration  -avoid nephrotoxic medications  -monitor renal function on serum chemistry  -i/o's  -Renal eval    Lupus  -previously on oxycodone 5mg for chronic pain, now using heroin as could no longer afford  -declines further opioid use at this time  -tylenol prn for pain    Anxiety/Depression  -consider psych consult, declined  -SW consulted for IVDA and depression  -no Suicidal or homicidal ideations     RA  -no longer on MTX due to financial reasons  -supportive care    Epilepsy  -last seizure >8 yrs ago  -seizure precautions

## 2020-06-25 NOTE — DIETITIAN INITIAL EVALUATION ADULT. - PERTINENT MEDS FT
MEDICATIONS  (STANDING):  cefTRIAXone Injectable. 1000 milliGRAM(s) IV Push every 24 hours  collagenase Ointment 1 Application(s) Topical daily  silver sulfADIAZINE 1% Cream 1 Application(s) Topical daily  sodium chloride 0.9%. 1000 milliLiter(s) (1000 mL/Hr) IV Continuous <Continuous>  vancomycin  IVPB 750 milliGRAM(s) IV Intermittent every 24 hours    MEDICATIONS  (PRN):  acetaminophen   Tablet .. 650 milliGRAM(s) Oral every 4 hours PRN Temp greater or equal to 38C (100.4F), Mild Pain (1 - 3)  LORazepam     Tablet 0.5 milliGRAM(s) Oral every 8 hours PRN Agitation  ondansetron Injectable 4 milliGRAM(s) IV Push every 6 hours PRN Nausea

## 2020-06-25 NOTE — DIETITIAN INITIAL EVALUATION ADULT. - 25 CAL
Pt called requesting a copy of MRI cervical, lumbar spine and brain  She was made aware that she needs to fill out a JUAN F before we can release her record  Pt verbalized understanding   Mailed JUAN F form to pt's home address per her request  [phone]

## 2020-06-25 NOTE — CHART NOTE - NSCHARTNOTEFT_GEN_A_CORE
Upon Nutritional Assessment by the Registered Dietitian your patient was determined to meet criteria / has evidence of the following diagnosis/diagnoses:          [ ]  Mild Protein Calorie Malnutrition        [ ]  Moderate Protein Calorie Malnutrition        [x ] Severe Protein Calorie Malnutrition        [ ] Unspecified Protein Calorie Malnutrition        [ ] Underweight / BMI <19        [ ] Morbid Obesity / BMI > 40      Findings as based on:  •  Comprehensive nutrition assessment and consultation  •  Calorie counts (nutrient intake analysis)  •  Food acceptance and intake status from observations by staff  •  Follow up  •  Patient education  •  Intervention secondary to interdisciplinary rounds  •   concerns      *********Malnutrition severe protein/calorie malnutrition in context of acute on chronic illness.   Etiology AEB inability to consume adequate nutrition 2/2 sepsis with history of IVD abuse.   Signs/Symptoms poor po intake, BMI 19, Severe muscle/fat wasting, +2 edema, PU stage 2.   Goal/Expected Outcome Optimal po intake meeting >80% of ENN. Abstinence of non prescription drugs. Reduced s/s of malnutri.    · Additional Recommendations  1) Continue to encourage po diet (regular) along with ordered po supplement (ensure enlive for additional calories/protein).  2) monitor daily weights   3) Thiamine 100mg daily, MVI w/ minerals to meet RDI's.   4) monitor labs/lytes and hydration replete as needed.   5) additional protein supplement Gelatein plus jello po TID (60gm protein)        PROVIDER Section:     By signing this assessment you are acknowledging and agree with the diagnosis/diagnoses assigned by the Registered Dietitian    Comments:

## 2020-06-25 NOTE — CONSULT NOTE ADULT - SUBJECTIVE AND OBJECTIVE BOX
Pt is a 38 yo female with a pm hx of SLE ,RA ( last seen by dr medina 1 year ago?)   ,reynards, epilepsy (last seizure 8 yrs ago), IVDA (clean for 4 years), who presented to ED today due to weakness. Pt states that her grandmother  approx. 3 months ago and for the past almost 2 months she has been snorting two bags of heroin a day. Pt states her heroin use is partially due to depression and anxiety since she passed away however also due to financial reasons as can no longer afford oxycodone which she had been taking for her lupus/RA chronic pain as Rx. Pt states she has wounds all over her body from falls, ulcers from previous attempted injection sites, which are not healing and bleeding    Pt describes weakness as fatigue, endorses sob with exertion, weight loss due to decreased appetite from depression and drug use, palpitations. Denies n/v/d, constipation, HA, hematemesis, hematochezia, dark stools, abd pain, cp, fevers, cough, leg swelling.   In ED pt found to have lactate 3.9 with Hgb of 5.5, , T 100.4. pt was admitted to ICU   renal eval called for elevated Creatinine- no previous Cr available to compare  last seen by a physician 1 year ago . pt denies hx of renal disease in past  pt admits to daily Mobic use previously and now daily Advil use at home  complaining of pain all over and on even slight touch       Patient is a 39y Female whom presented to the hospital with     PAST MEDICAL & SURGICAL HISTORY:  Smoker  Heroin abuse  H/O Raynaud's syndrome  Rheumatoid arthritis  Lupus  Gall bladder stones  H/O appendicitis  H/O tubal ligation      MEDICATIONS  (STANDING):  cefTRIAXone Injectable. 1000 milliGRAM(s) IV Push every 24 hours  collagenase Ointment 1 Application(s) Topical daily  silver sulfADIAZINE 1% Cream 1 Application(s) Topical daily  sodium chloride 0.9%. 1000 milliLiter(s) (100 mL/Hr) IV Continuous <Continuous>  vancomycin  IVPB 750 milliGRAM(s) IV Intermittent every 24 hours      Allergies    morphine (Unknown)    Intolerances        SOCIAL HISTORY:  Denies ETOh,Smoking,     FAMILY HISTORY:  No pertinent family history in first degree relatives: cannot recall family history at this time      REVIEW OF SYSTEMS:    CONSTITUTIONAL: No weakness, fevers or chills  EYES/ENT: No visual changes;  No vertigo or throat pain   NECK: No pain or stiffness  RESPIRATORY: No cough, wheezing, hemoptysis; No shortness of breath  CARDIOVASCULAR: No chest pain or palpitations  GASTROINTESTINAL: No abdominal or epigastric pain. No nausea, vomiting, or hematemesis; No diarrhea or constipation. No melena or hematochezia.  GENITOURINARY: No dysuria, frequency or hematuria  NEUROLOGICAL: No numbness or weakness  SKIN: No itching, burning, rashes, or lesions   All other review of systems is negative unless indicated above.    VITAL:  T(C): , Max: 36.8 (20 @ 00:58)  T(F): , Max: 98.3 (20 @ 00:58)  HR: 103 (20 @ 15:00)  BP: 127/84 (20 @ 15:00)  BP(mean): 95 (20 @ 15:00)  RR: 24 (20 @ 15:00)  SpO2: 100% (20 @ 15:00)  Wt(kg): --    I and O's:     @ 07:01  -   @ 07:00  --------------------------------------------------------  IN: 2180 mL / OUT: 375 mL / NET: 1805 mL     @ 07:01  -   @ 16:43  --------------------------------------------------------  IN: 170 mL / OUT: 200 mL / NET: -30 mL          PHYSICAL EXAM:    Constitutional: weak, malnourished and pale , discheveled   HEENT: MM + dry   Neck: No LAD, No JVD  Respiratory: CTAB  Cardiovascular: S1 and S2  Gastrointestinal: BS+, soft, NT/ND  Extremities: peripheral edema ++ 3-4 w multiple lessions and ulces on upper and lower ext   Neurological: A/O x 3, moving extremities   : No Salgado  Skin: multiple lesions/rashes and weeping areas   Access: Not applicable    LABS:      134    |  104    |  53     ----------------------------<  59        2020 06:30  4.5     |  20     |  2.41     134    |  104    |  48     ----------------------------<  68        2020 07:05  4.8     |  20     |  2.31     134    |  102    |  54     ----------------------------<  55        2020 22:22  4.6     |  22     |  2.56     Ca    7.5        2020 06:30  Albumin, Serum: 0.8 g/dL (20 @ 07:05)      Ca    7.2        2020 07:05    Phos  3.3       2020 07:05    Mg     1.7       2020 06:30                             7.4    11.72 )-----------( 71       ( 2020 06:30 )             21.9     Urine Studies:  Urinalysis Basic - ( 2020 05:41 )    Color: Ale / Appearance: Slightly Turbid / S.010 / pH: x  Gluc: x / Ketone: Negative  / Bili: Negative / Urobili: 1 mg/dL   Blood: x / Protein: 30 mg/dL / Nitrite: Negative   Leuk Esterase: Moderate / RBC: 11-25 /HPF / WBC 3-5   Sq Epi: x / Non Sq Epi: Negative / Bacteria: Many      RADIOLOGY & ADDITIONAL STUDIES: Pt is a 40 yo female with a pm hx of SLE ,RA ( last seen by dr medina 1 year ago?)   ,reynards, epilepsy (last seizure 8 yrs ago), IVDA (clean for 4 years), who presented to ED today due to weakness. Pt states that her grandmother  approx. 3 months ago and for the past almost 2 months she has been snorting two bags of heroin a day. Pt states her heroin use is partially due to depression and anxiety since she passed away however also due to financial reasons as can no longer afford oxycodone which she had been taking for her lupus/RA chronic pain as Rx. Pt states she has wounds all over her body from falls, ulcers from previous attempted injection sites, which are not healing and bleeding    Pt describes weakness as fatigue, endorses sob with exertion, weight loss due to decreased appetite from depression and drug use, palpitations. Denies n/v/d, constipation, HA, hematemesis, hematochezia, dark stools, abd pain, cp, fevers, cough, leg swelling.   In ED pt found to have lactate 3.9 with Hgb of 5.5, , T 100.4. pt was admitted to ICU   renal eval called for elevated Creatinine- no previous Cr available to compare  last seen by a physician 1 year ago . pt denies hx of renal disease in past  pt admits to daily Mobic use previously and now daily Advil use at home  complaining of pain all over and on even slight touch       PAST MEDICAL & SURGICAL HISTORY:  Smoker  Heroin abuse  H/O Raynaud's syndrome  Rheumatoid arthritis  Lupus  Gall bladder stones  H/O appendicitis  H/O tubal ligation      MEDICATIONS  (STANDING):  cefTRIAXone Injectable. 1000 milliGRAM(s) IV Push every 24 hours  collagenase Ointment 1 Application(s) Topical daily  silver sulfADIAZINE 1% Cream 1 Application(s) Topical daily  sodium chloride 0.9%. 1000 milliLiter(s) (100 mL/Hr) IV Continuous <Continuous>  vancomycin  IVPB 750 milliGRAM(s) IV Intermittent every 24 hours      Allergies    morphine (Unknown)    Intolerances        SOCIAL HISTORY:  Denies ETOh,Smoking,     FAMILY HISTORY:  No pertinent family history in first degree relatives: cannot recall family history at this time      REVIEW OF SYSTEMS:    CONSTITUTIONAL: No weakness, fevers or chills  EYES/ENT: No visual changes;  No vertigo or throat pain   NECK: No pain or stiffness  RESPIRATORY: No cough, wheezing, hemoptysis; No shortness of breath  CARDIOVASCULAR: No chest pain or palpitations  GASTROINTESTINAL: No abdominal or epigastric pain. No nausea, vomiting, or hematemesis; No diarrhea or constipation. No melena or hematochezia.  GENITOURINARY: No dysuria, frequency or hematuria  NEUROLOGICAL: No numbness or weakness  SKIN: No itching, burning, rashes, or lesions   All other review of systems is negative unless indicated above.    VITAL:  T(C): , Max: 36.8 (20 @ 00:58)  T(F): , Max: 98.3 (20 @ 00:58)  HR: 103 (20 @ 15:00)  BP: 127/84 (20 @ 15:00)  BP(mean): 95 (20 @ 15:00)  RR: 24 (20 @ 15:00)  SpO2: 100% (20 @ 15:00)  Wt(kg): --    I and O's:     @ 07:  -   @ 07:00  --------------------------------------------------------  IN: 2180 mL / OUT: 375 mL / NET: 1805 mL     @ 07:  -   @ 16:43  --------------------------------------------------------  IN: 170 mL / OUT: 200 mL / NET: -30 mL        PHYSICAL EXAM:    Constitutional: weak, malnourished and pale , discheveled   HEENT: MM + dry   Neck: No LAD, No JVD  Respiratory: CTAB  Cardiovascular: S1 and S2  Gastrointestinal: BS+, soft, NT/ND  Extremities: peripheral edema ++ 3-4 w multiple lessions and ulces on upper and lower ext   Neurological: A/O x 3, moving extremities   : No Salgado  Skin: multiple lesions/rashes and weeping areas   Access: Not applicable    LABS:      134    |  104    |  53     ----------------------------<  59        2020 06:30  4.5     |  20     |  2.41     134    |  104    |  48     ----------------------------<  68        2020 07:05  4.8     |  20     |  2.31     134    |  102    |  54     ----------------------------<  55        2020 22:22  4.6     |  22     |  2.56     Ca    7.5        2020 06:30  Albumin, Serum: 0.8 g/dL (20 @ 07:05)      Ca    7.2        2020 07:05    Phos  3.3       2020 07:05    Mg     1.7       2020 06:30                             7.4    11.72 )-----------( 71       ( 2020 06:30 )             21.9     Urine Studies:  Urinalysis Basic - ( 2020 05:41 )    Color: Ale / Appearance: Slightly Turbid / S.010 / pH: x  Gluc: x / Ketone: Negative  / Bili: Negative / Urobili: 1 mg/dL   Blood: x / Protein: 30 mg/dL / Nitrite: Negative   Leuk Esterase: Moderate / RBC: 11-25 /HPF / WBC 3-5   Sq Epi: x / Non Sq Epi: Negative / Bacteria: Many      RADIOLOGY & ADDITIONAL STUDIES:

## 2020-06-25 NOTE — DIETITIAN INITIAL EVALUATION ADULT. - ADD RECOMMEND
1) Continue to encourage po diet (regular) along with ordered po supplement (ensure enlive for additional calories/protein). 2) monitor daily weights 3) Thiamine 100mg daily, MVI w/ minerals to meet RDI's. 4) monitor labs/lytes and hydration replete as needed. 1) Continue to encourage po diet (regular) along with ordered po supplement (ensure enlive for additional calories/protein). 2) monitor daily weights 3) Thiamine 100mg daily, MVI w/ minerals to meet RDI's. 4) monitor labs/lytes and hydration replete as needed. 5) additional protein supplement Gelatein plus jello po TID (60gm protein)

## 2020-06-25 NOTE — DIETITIAN INITIAL EVALUATION ADULT. - PHYSICAL APPEARANCE
underweight/other (specify) NFPE   Skin: Sacrum stage 2 with +2 generalized edema noted. Scot score-11 (high risk of skin breakdown). NFPE unable to perform do to isolation, but she appears to have visible severe muscle/fat wasting (upper body).   Skin: Sacrum stage 2 with +2 generalized edema noted. Scot score-11 (high risk of skin breakdown).

## 2020-06-25 NOTE — PROVIDER CONTACT NOTE (OTHER) - REASON
Patient stated" I feel like urinating but unable to go " . pt noted urinating around reynoso catheter

## 2020-06-25 NOTE — DIETITIAN INITIAL EVALUATION ADULT. - OTHER INFO
40 y/o female with h/o SLE, RA, Reynauld syndrome, epilepsy (last seizure 8 yrs ago), IVDA (heroin) was admitted on 6/24 for increased weakness, fatigue, endorses sob with exertion, weight loss due to decreased appetite, palpitations. Denies fever, cough. In ER she was found with low grade fever and received vancomycin IV and zosyn. In the ED, Hgb was 5.5 with lactate of 3.5. ID was consulted for positive blood cultures , gram positive cocci in clusters and she was started on Vanco and Ceftriaxone. Pt received 5L fluid and now on maintenance fluid with lactate ringers. Labs reviewed: Lactate ^, sodium (L), Magnesium 1.7 (monitor and replete on lower range of normal). Output: No bowel movement documented since admission x 2 days, urine-375ml. Input: IV piggyback-300ml, IVF-1000ml, Oral 200ml, Lactated ringers-680ml. (total volume- 2180ml). 38 y/o female with h/o SLE, RA, Reynauld syndrome, epilepsy (last seizure 8 yrs ago), IVDA (heroin) was admitted on 6/24 for increased weakness, fatigue, endorses sob with exertion, weight loss due to decreased appetite, palpitations. Denies fever, cough. In ER she was found with low grade fever and received vancomycin IV and zosyn. In the ED, Hgb was 5.5 with lactate of 3.5. ID was consulted for positive blood cultures , gram positive cocci in clusters and she was started on Vanco and Ceftriaxone. Pt received 5L fluid and now on maintenance fluid with lactate ringers. Labs reviewed: Lactate ^, sodium (L), Magnesium 1.7 (monitor and replete on lower range of normal). Output: No bowel movement documented since admission x 2 days, urine-375ml. Input: IV piggyback-300ml, IVF-1000ml, Oral 200ml, Lactated ringers-680ml. (total volume- 2180ml). Pt appears extremely thin and cachectic. Lethargic and as per RN currently on ativan however will be alert at times and is able to consume some po. Ensure enlive ordered by MD AVALOS suggest gelatein plus jello also due to poor intake and nutritional status. Pt not meeting estimated energy/protein needs since admission, unknown intake PTA suspect poor.

## 2020-06-26 DIAGNOSIS — R78.81 BACTEREMIA: ICD-10-CM

## 2020-06-26 LAB
-  AMPICILLIN/SULBACTAM: SIGNIFICANT CHANGE UP
-  AMPICILLIN/SULBACTAM: SIGNIFICANT CHANGE UP
-  CEFAZOLIN: SIGNIFICANT CHANGE UP
-  CEFAZOLIN: SIGNIFICANT CHANGE UP
-  CLINDAMYCIN: SIGNIFICANT CHANGE UP
-  DAPTOMYCIN: SIGNIFICANT CHANGE UP
-  DAPTOMYCIN: SIGNIFICANT CHANGE UP
-  ERYTHROMYCIN: SIGNIFICANT CHANGE UP
-  GENTAMICIN: SIGNIFICANT CHANGE UP
-  GENTAMICIN: SIGNIFICANT CHANGE UP
-  LINEZOLID: SIGNIFICANT CHANGE UP
-  LINEZOLID: SIGNIFICANT CHANGE UP
-  OXACILLIN: SIGNIFICANT CHANGE UP
-  OXACILLIN: SIGNIFICANT CHANGE UP
-  PENICILLIN: SIGNIFICANT CHANGE UP
-  PENICILLIN: SIGNIFICANT CHANGE UP
-  RIFAMPIN: SIGNIFICANT CHANGE UP
-  RIFAMPIN: SIGNIFICANT CHANGE UP
-  TETRACYCLINE: SIGNIFICANT CHANGE UP
-  TETRACYCLINE: SIGNIFICANT CHANGE UP
-  TRIMETHOPRIM/SULFAMETHOXAZOLE: SIGNIFICANT CHANGE UP
-  TRIMETHOPRIM/SULFAMETHOXAZOLE: SIGNIFICANT CHANGE UP
-  VANCOMYCIN: SIGNIFICANT CHANGE UP
-  VANCOMYCIN: SIGNIFICANT CHANGE UP
ANION GAP SERPL CALC-SCNC: 11 MMOL/L — SIGNIFICANT CHANGE UP (ref 5–17)
BUN SERPL-MCNC: 54 MG/DL — HIGH (ref 7–23)
C3 SERPL-MCNC: 54 MG/DL — LOW (ref 81–157)
C4 SERPL-MCNC: 10 MG/DL — LOW (ref 13–39)
CALCIUM SERPL-MCNC: 7.7 MG/DL — LOW (ref 8.5–10.1)
CHLORIDE SERPL-SCNC: 105 MMOL/L — SIGNIFICANT CHANGE UP (ref 96–108)
CO2 SERPL-SCNC: 18 MMOL/L — LOW (ref 22–31)
CREAT SERPL-MCNC: 2.36 MG/DL — HIGH (ref 0.5–1.3)
CULTURE RESULTS: SIGNIFICANT CHANGE UP
DSDNA AB SER-ACNC: 428 IU/ML — HIGH
GLUCOSE SERPL-MCNC: 48 MG/DL — LOW (ref 70–99)
HCT VFR BLD CALC: 24.9 % — LOW (ref 34.5–45)
HGB BLD-MCNC: 8.3 G/DL — LOW (ref 11.5–15.5)
LACTATE SERPL-SCNC: 4.5 MMOL/L — CRITICAL HIGH (ref 0.7–2)
MCHC RBC-ENTMCNC: 29.2 PG — SIGNIFICANT CHANGE UP (ref 27–34)
MCHC RBC-ENTMCNC: 33.3 GM/DL — SIGNIFICANT CHANGE UP (ref 32–36)
MCV RBC AUTO: 87.7 FL — SIGNIFICANT CHANGE UP (ref 80–100)
METHOD TYPE: SIGNIFICANT CHANGE UP
METHOD TYPE: SIGNIFICANT CHANGE UP
ORGANISM # SPEC MICROSCOPIC CNT: SIGNIFICANT CHANGE UP
PLATELET # BLD AUTO: 74 K/UL — LOW (ref 150–400)
POTASSIUM SERPL-MCNC: 4.3 MMOL/L — SIGNIFICANT CHANGE UP (ref 3.5–5.3)
POTASSIUM SERPL-SCNC: 4.3 MMOL/L — SIGNIFICANT CHANGE UP (ref 3.5–5.3)
RBC # BLD: 2.84 M/UL — LOW (ref 3.8–5.2)
RBC # FLD: 19.2 % — HIGH (ref 10.3–14.5)
RHEUMATOID FACT SERPL-ACNC: 15 IU/ML — HIGH (ref 0–13)
SODIUM SERPL-SCNC: 134 MMOL/L — LOW (ref 135–145)
SODIUM UR-SCNC: <20 MMOL/L — SIGNIFICANT CHANGE UP
SPECIMEN SOURCE: SIGNIFICANT CHANGE UP
VANCOMYCIN TROUGH SERPL-MCNC: 26.1 UG/ML — CRITICAL HIGH (ref 10–20)
WBC # BLD: 14.07 K/UL — HIGH (ref 3.8–10.5)
WBC # FLD AUTO: 14.07 K/UL — HIGH (ref 3.8–10.5)

## 2020-06-26 PROCEDURE — 99223 1ST HOSP IP/OBS HIGH 75: CPT

## 2020-06-26 PROCEDURE — 99233 SBSQ HOSP IP/OBS HIGH 50: CPT

## 2020-06-26 RX ORDER — VANCOMYCIN HCL 1 G
750 VIAL (EA) INTRAVENOUS DAILY
Refills: 0 | Status: DISCONTINUED | OUTPATIENT
Start: 2020-06-27 | End: 2020-06-30

## 2020-06-26 RX ORDER — OXYCODONE HYDROCHLORIDE 5 MG/1
5 TABLET ORAL ONCE
Refills: 0 | Status: DISCONTINUED | OUTPATIENT
Start: 2020-06-26 | End: 2020-06-26

## 2020-06-26 RX ORDER — THIAMINE MONONITRATE (VIT B1) 100 MG
100 TABLET ORAL DAILY
Refills: 0 | Status: DISCONTINUED | OUTPATIENT
Start: 2020-06-26 | End: 2020-07-31

## 2020-06-26 RX ORDER — FOLIC ACID 0.8 MG
1 TABLET ORAL DAILY
Refills: 0 | Status: DISCONTINUED | OUTPATIENT
Start: 2020-06-26 | End: 2020-07-31

## 2020-06-26 RX ADMIN — SODIUM CHLORIDE 50 MILLILITER(S): 9 INJECTION INTRAMUSCULAR; INTRAVENOUS; SUBCUTANEOUS at 22:29

## 2020-06-26 RX ADMIN — Medication 1 APPLICATION(S): at 08:04

## 2020-06-26 RX ADMIN — Medication 1 MILLIGRAM(S): at 10:59

## 2020-06-26 RX ADMIN — OXYCODONE HYDROCHLORIDE 5 MILLIGRAM(S): 5 TABLET ORAL at 20:30

## 2020-06-26 RX ADMIN — OXYCODONE HYDROCHLORIDE 5 MILLIGRAM(S): 5 TABLET ORAL at 02:00

## 2020-06-26 RX ADMIN — Medication 650 MILLIGRAM(S): at 00:59

## 2020-06-26 RX ADMIN — Medication 0.5 MILLIGRAM(S): at 08:48

## 2020-06-26 RX ADMIN — Medication 1 APPLICATION(S): at 08:05

## 2020-06-26 RX ADMIN — Medication 0.5 MILLIGRAM(S): at 16:30

## 2020-06-26 RX ADMIN — Medication 2 MILLIGRAM(S): at 23:18

## 2020-06-26 RX ADMIN — Medication 100 MILLIGRAM(S): at 10:59

## 2020-06-26 NOTE — PROGRESS NOTE ADULT - SUBJECTIVE AND OBJECTIVE BOX
PE:    Constitutional:  No acute distress  HEENT: NC/AT, EOMI, PERRLA, conjunctivae clear; ears and nose atraumatic; pharynx benign  Neck: supple; thyroid not palpable  Back: no tenderness  Respiratory: respiratory effort normal; clear to auscultation  Cardiovascular: S1S2 regular, no murmurs  Abdomen: soft, not tender, not distended, positive BS; no liver or spleen organomegaly  Genitourinary: no suprapubic tenderness  Lymphatic: no LN palpable  Musculoskeletal: no muscle tenderness, no joint swelling or tenderness; multiple contusions  Extremities: no pedal edema  Neurological/ Psychiatric: AxOx3, judgement and insight normal; moving all extremities  Skin: multiple skin ulcers and rashes; no drainage    Labs: all available labs reviewed                        7.4    8.05  )-----------( 100      ( 2020 07:05 )             21.9     06-24    134<L>  |  104  |  48<H>  ----------------------------<  68<L>  4.8   |  20<L>  |  2.31<H>    Ca    7.2<L>      2020 07:05  Phos  3.3     06-24  Mg     1.1     06-24    TPro  4.8<L>  /  Alb  0.8<L>  /  TBili  1.4<H>  /  DBili  x   /  AST  73<H>  /  ALT  27  /  AlkPhos  298<H>  06-24     LIVER FUNCTIONS - ( 2020 07:05 )  Alb: 0.8 g/dL / Pro: 4.8 gm/dL / ALK PHOS: 298 U/L / ALT: 27 U/L / AST: 73 U/L / GGT: x           Urinalysis Basic - ( 2020 05:41 )    Color: Ale / Appearance: Slightly Turbid / S.010 / pH: x  Gluc: x / Ketone: Negative  / Bili: Negative / Urobili: 1 mg/dL   Blood: x / Protein: 30 mg/dL / Nitrite: Negative   Leuk Esterase: Moderate / RBC: 11-25 /HPF / WBC 3-5   Sq Epi: x / Non Sq Epi: Negative / Bacteria: Many      Culture - Urine (collected 2020 05:41)  Source: .Urine None  Preliminary Report (2020 11:17):    >100,000 CFU/ml Staphylococcus aureus    <10,000 CFU/ml Normal Urogenital elsa present    Culture - Blood (collected 2020 22:22)  Source: .Blood None  Gram Stain (2020 15:07):    Growth in aerobic bottle: Gram Positive Cocci in Clusters    Growth in anaerobic bottle: Gram Positive Cocci in Clusters  Preliminary Report (2020 11:37):    Growth in aerobic bottle: Staphylococcus aureus    Growth in anaerobic bottle: Gram Positive Cocci in Clusters  Organism: Blood Culture PCR (2020 15:14)      -  Methicillin resistant Staphylococcus aureus (MRSA): Detec      Method Type: PCR    Culture - Blood (collected 2020 22:22)  Source: .Blood None  Gram Stain (2020 17:42):    Growth in aerobic bottle: Gram Positive Cocci in Clusters    Growth in anaerobic bottle: Gram Positive Cocci in Clusters  Preliminary Report (2020 16:19):    Growth in aerobic and anaerobic bottles: Staphylococcus aureus    See previous culture [id number]        Radiology: all available radiological tests reviewed    Cardiac Echo:  The left ventricle is normal in size, wall thickness, wall motion and   contractility.   Estimated left ventricular ejection fraction is 60 %.   The right atrium is normal in size.   A catheter is noted in the right atrium.   The aortic valve is trileaflet with thin pliable leaflets.   The mitral valve leaflets appear thickened.   Trace mitral regurgitation is present.   The tricuspid valve leaflets appear mildly thickened and/or calcified, but   open well.   Trace tricuspid valve regurgitation is present.   Trace pulmonic valvular regurgitation is present.    < from: US Abdomen Complete (20 @ 17:46) >  Avascular and mildly echogenic soft tissue mass surrounding portal vein, biliary ducts, and RIGHT hepatic artery of indeterminate etiology. FOLLOW-UP CONTRAST CT SCAN RECOMMENDED.,  Status post cholecystectomy.  Mild splenomegaly.  Fatty infiltration ofliver.    < end of copied text >      Advanced directives addressed: full resuscitation

## 2020-06-26 NOTE — CONSULT NOTE ADULT - PROBLEM SELECTOR RECOMMENDATION 9
Medical / infectious disease services have requested a GRAHAM to evaluate for vegetation given the presence of drug use and MRSA bacteremia -- I discussed procedure with patient and she is agreeable; scheduled for Monday 6/29 AM.

## 2020-06-26 NOTE — PROGRESS NOTE ADULT - ASSESSMENT
38 y/o female with h/o SLE, RA, Reynauld syndrome, epilepsy (last seizure 8 yrs ago), IVDA (heroin) was admitted on  for increased weakness. Pt states that her grandmother  approx. 3 months ago and for the past almost 2 months she has been snorting two bags of heroin a day. Pt states her heroin use is partially due to depression and anxiety since gma passed away however also due to financial reasons as can no longer afford oxycodone which she had been taking for her lupus/RA chronic pain as Rx. Pt states she has wounds all over her body from falls, ulcers from previous attempted injection sites, which are not healing and bleed at times. Pt describes weakness as fatigue, endorses sob with exertion, weight loss due to decreased appetite, palpitations. Denies fever, cough. In ER she was found with low grade fever and received vancomycin IV and zosyn.     1. Sepsis with MRSA. Skin ulcers ?track marks. Possible SBE. Soft tissue portal vein/ liver mass ?cause Probable arms cellulitis. Chronic renal failure.   -poor IV access: has TLC, but no peripheral lines can be obtained  -BC reviewed  -repeat BC could not be obtained so far  -on vancomycin and ceftriaxone 1 gm IV qd # 2  -tolerating abx well so far; no side effects noted  -vancomycin level is high; hold vancomycin for 36h and recheck level  -consider cardiology evaluation and GRAHAM  -continue IV abx with vancomycin IV  -monitor temps  -f/u CBC  -supportive care  2. Other issues:   -care per medicine

## 2020-06-26 NOTE — CONSULT NOTE ADULT - SUBJECTIVE AND OBJECTIVE BOX
CHIEF COMPLAINT: Fatigue    HPI:  39 year old woman with a history of Lupus, RA, Raynaud's seizure disorder, anxiety/depression, and drug abuse who presented to the ER on 6/23/2020 with complaints of weakness and fever.  She had been ill x several days; unable to identify exacerbating or alleviating factors.  She was found to be febrile and severely anemic.  She was subsequently diagnosed with sepsis due to MRSA and possible SBE -- cardiology was consulted this afternoon with request to perform GRAHAM.  She reports no past history of heart disease.    PAST MEDICAL & SURGICAL HISTORY:  Smoker  Heroin abuse  H/O Raynaud's syndrome  Rheumatoid arthritis  Lupus  Gall bladder stones  H/O appendicitis  H/O tubal ligation    SOCIAL HISTORY:   Smoking: Smoker  Drug Use: Yes    FAMILY HISTORY:  Details not known to patient    MEDICATIONS  (STANDING):  collagenase Ointment 1 Application(s) Topical daily  folic acid 1 milliGRAM(s) Oral daily  silver sulfADIAZINE 1% Cream 1 Application(s) Topical daily  sodium chloride 0.9%. 1000 milliLiter(s) (100 mL/Hr) IV Continuous <Continuous>  thiamine 100 milliGRAM(s) Oral daily    MEDICATIONS  (PRN):  acetaminophen   Tablet .. 650 milliGRAM(s) Oral every 4 hours PRN Temp greater or equal to 38C (100.4F), Mild Pain (1 - 3)  LORazepam     Tablet 0.5 milliGRAM(s) Oral every 8 hours PRN Agitation  ondansetron Injectable 4 milliGRAM(s) IV Push every 6 hours PRN Nausea    Allergies:    morphine (Unknown)    REVIEW OF SYSTEMS:  CONSTITUTIONAL: + weakness, + recent fever, + fatigue  Eyes: No visual changes  NECK: No pain or stiffness  RESPIRATORY: No cough, wheezing, hemoptysis; No shortness of breath  CARDIOVASCULAR: No chest pain or palpitations  GASTROINTESTINAL: No abdominal pain.   GENITOURINARY: No dysuria  NEUROLOGICAL: No numbness.  SKIN: + skin lesions  All other review of systems is negative unless indicated above    VITAL SIGNS:   Vital Signs Last 24 Hrs  T(C): 35.9 (26 Jun 2020 14:06), Max: 36.9 (26 Jun 2020 00:58)  T(F): 96.7 (26 Jun 2020 14:06), Max: 98.4 (26 Jun 2020 00:58)  HR: 85 (26 Jun 2020 14:00) (72 - 116)  BP: 120/76 (26 Jun 2020 14:00) (92/60 - 135/86)  BP(mean): 86 (26 Jun 2020 14:00) (67 - 97)  RR: 17 (26 Jun 2020 12:00) (15 - 35)  SpO2: 100% (26 Jun 2020 14:00) (98% - 100%)    PHYSICAL EXAM:  Constitutional: NAD, drowsy and appearing much older than stated age  HEENT: No oral cyanosis. Poor dentition  Pulmonary: Non-labored, breath sounds are clear bilaterally  Cardiovascular: S1 and S2, regular rate and rhythm  Gastrointestinal: Bowel Sounds present, soft, nontender.   Lymph: No peripheral edema. No cervical lymphadenopathy.  Neurological: Alert, no focal deficits  Skin: Multiple skin lesions / scars  Psych:  Cooperative drwosy    LABS:                  8.3    14.07 )-----------( 74       ( 26 Jun 2020 05:54 )             24.9             134    |  105    |  54     ----------------------------<  48     4.3     |  18     |  2.36     12 Lead ECG (06.24.20 @ 10:51):  Normal sinus rhythm, Low voltage QRS    TTE Echo Complete w/o Contrast w/ Doppler (06.24.20 @ 14:01):     The left ventricle is normal in size, wall thickness, wall motion and contractility. Estimated left ventricular ejection fraction is 60 %.   The right atrium is normal in size.   A catheter is noted in the right atrium.   The aortic valve is trileaflet with thin pliable leaflets.   The mitral valve leaflets appear thickened.   Trace mitral regurgitation is present.   The tricuspid valve leaflets appear mildly thickened and/or calcified, but open well.   Trace tricuspid valve regurgitation is present.   Trace pulmonic valvular regurgitation is present.    US Abdomen Complete (06.25.20 @ 17:46):     Avascular and mildly echogenic soft tissue mass surrounding portal vein, biliary ducts, and RIGHT hepatic artery of indeterminate etiology. FOLLOW-UP CONTRAST CT SCAN RECOMMENDED.,  Status post cholecystectomy.Mild splenomegaly.Fatty infiltration of liver.    Xray Chest 1 View-PORTABLE IMMEDIATE (06.23.20 @ 22:54):  1. Right IJ central venous catheter.  2. Nonspecific vertical, linear density in the peripheral right upper lobe, likely confluence of shadows/artifact or on the patient.  3. Otherwise, no acute cardiopulmonary disease findings.

## 2020-06-26 NOTE — PROGRESS NOTE ADULT - SUBJECTIVE AND OBJECTIVE BOX
Pt is a 38 yo female with a pm hx of SLE ,RA ( last seen by dr medina 1 year ago?)   ,reynards, epilepsy (last seizure 8 yrs ago), IVDA (clean for 4 years), who presented to ED today due to weakness. Pt states that her grandmother  approx. 3 months ago and for the past almost 2 months she has been snorting two bags of heroin a day. Pt states her heroin use is partially due to depression and anxiety since she passed away however also due to financial reasons as can no longer afford oxycodone which she had been taking for her lupus/RA chronic pain as Rx. Pt states she has wounds all over her body from falls, ulcers from previous attempted injection sites, which are not healing and bleeding    Pt describes weakness as fatigue, endorses sob with exertion, weight loss due to decreased appetite from depression and drug use, palpitations. Denies n/v/d, constipation, HA, hematemesis, hematochezia, dark stools, abd pain, cp, fevers, cough, leg swelling.   In ED pt found to have lactate 3.9 with Hgb of 5.5, , T 100.4. pt was admitted to ICU   renal eval called for elevated Creatinine- no previous Cr available to compare  last seen by a physician 1 year ago . pt denies hx of renal disease in past  pt admits to daily Mobic use previously and now daily Advil use at home      ** seen earlier today    lethargic    no new events         PAST MEDICAL & SURGICAL HISTORY:  Smoker  Heroin abuse  H/O Raynaud's syndrome  Rheumatoid arthritis  Lupus  Gall bladder stones  H/O appendicitis  H/O tubal ligation      MEDICATIONS  (STANDING):  collagenase Ointment 1 Application(s) Topical daily  folic acid 1 milliGRAM(s) Oral daily  silver sulfADIAZINE 1% Cream 1 Application(s) Topical daily  sodium chloride 0.9%. 1000 milliLiter(s) (100 mL/Hr) IV Continuous <Continuous>  thiamine 100 milliGRAM(s) Oral daily    MEDICATIONS  (PRN):  acetaminophen   Tablet .. 650 milliGRAM(s) Oral every 4 hours PRN Temp greater or equal to 38C (100.4F), Mild Pain (1 - 3)  LORazepam     Tablet 0.5 milliGRAM(s) Oral every 8 hours PRN Agitation  ondansetron Injectable 4 milliGRAM(s) IV Push every 6 hours PRN Nausea      Allergies    morphine (Unknown)    Intolerances    ROS:    limited by pt mental status       Vital Signs Last 24 Hrs  T(C): 36.6 (2020 18:40), Max: 36.9 (2020 00:58)  T(F): 97.8 (2020 18:40), Max: 98.4 (2020 00:58)  HR: 114 (2020 18:00) (72 - 116)  BP: 125/82 (2020 18:00) (97/58 - 135/86)  BP(mean): 94 (2020 18:00) (67 - 97)  RR: 17 (2020 12:00) (15 - 35)  SpO2: 100% (2020 18:00) (98% - 100%)        I&O's Detail    2020 07:01  -  2020 07:00  --------------------------------------------------------  IN:    Oral Fluid: 170 mL    sodium chloride 0.9%.: 1800 mL  Total IN: 1970 mL    OUT:    Voided: 200 mL  Total OUT: 200 mL        PHYSICAL EXAM:    Constitutional: weak, malnourished and pale , discheveled   HEENT: MM + dry   Neck: No LAD, No JVD  Respiratory: CTAB  Cardiovascular: S1 and S2  Gastrointestinal: BS+, soft, NT/ND  Extremities: peripheral edema ++ 3-4 w multiple lessions and ulces on upper and lower ext   Neurological: A/O x 3, moving extremities   : No Salgado  Skin: multiple lesions/rashes and weeping areas   Access: Not applicable    LABS:    Double Stranded DNA Antibody: 428: Method: EIA            Reference Ranges            Interpretation            <= 29    IU/mL     Negative            30 - 75  IU/mL     Borderline            > 75      IU/mL     Positive IU/mL (20 @ 05:54)      C3 Complement, Serum: 54 mg/dL (20 @ 05:54)    C4 Complement, Serum: 10 mg/dL (20 @ 05:54)    HIV-1/2 Combo Result: Nonreact:   Acute Hepatitis Panel (06.24.20 @ 07:05)   Hepatitis C Virus S/CO Ratio: 0.29 S/CO    Hepatitis B Core IgM Antibody: Nonreact    Hepatitis B Surface Antigen: Nonreact    Hepatitis A IgM Antibody: Nonreact        134    |  105    |  54     ----------------------------<  48        2020 05:54  4.3     |  18     |  2.36     134    |  104    |  53     ----------------------------<  59        2020 06:30  4.5     |  20     |  2.41     134    |  104    |  48     ----------------------------<  68        2020 07:05  4.8     |  20     |  2.31     Ca    7.7        2020 05:54  Ca    7.5        2020 06:30    Phos  3.3       2020 07:05    Mg     1.7       2020 06:30  Mg     1.1       2020 07:05    TPro  4.8    /  Alb  0.8    /  TBili  1.4    /        2020 07:05  DBili  x      /  AST  73     /  ALT  27     /  AlkPhos  298      TPro  6.1    /  Alb  1.1    /  TBili  1.2    /        2020 22:22  DBili  x      /  AST  93     /  ALT  37     /  AlkPhos  359        Urine Microscopic-Add On (NC) (20 @ 21:20)    Red Blood Cell - Urine: >50 /HPF    White Blood Cell - Urine: 6-10    Bacteria: Many    Comment - Urine: Moderate Trichomonas    Epithelial Cells: Many    Protein/Creatinine Ratio Calculation: 1.9 Ratio (20 @ 21:20)    Sodium, Random Urine: <20:     Urine Studies:  Urinalysis Basic - ( 2020 05:41 )    Color: Ale / Appearance: Slightly Turbid / S.010 / pH: x  Gluc: x / Ketone: Negative  / Bili: Negative / Urobili: 1 mg/dL   Blood: x / Protein: 30 mg/dL / Nitrite: Negative   Leuk Esterase: Moderate / RBC: 11-25 /HPF / WBC 3-5   Sq Epi: x / Non Sq Epi: Negative / Bacteria: Many      RADIOLOGY & ADDITIONAL STUDIES:    INTERPRETATION:  Ultrasound of the abdomen       CLINICAL INFORMATION: Acute renal injury. History of drug abuse. Sepsis. Assess for hydronephrosis. Assess for liver cirrhosis.    TECHNIQUE:   Transcutaneous ultrasonography of the abdomen was performed.    FINDINGS:    No previous examinations are available for review.  There is a hypoechoic complex avascular soft tissue tissue 3.4 x 3.0 x 4.4 cm mass surrounding the portal vein, RIGHT hepatic artery and biliary ducts of indeterminate etiology. Abdominal contrast CT scan recommended to differentiate origin of this mass, either from liver, pancreas or bowel. No recent prior CT scan available for comparison.    The pancreas is grossly intact.    There is diffuse fatty infiltration of liver parenchyma measuring 18 cm in length. No focal mass. Hepatic and portal veins appear patent and are not displaced.  No intrahepatic or ductal dilatation..  The common duct  measures 0.84 cm.       Status post cholecystectomy. .      There is mild splenomegaly, spleen measuring 13.2 cm in length. The right kidney measures 11.8 cm in length.  It demonstrates no mass, calculus or hydronephrosis. Trace fluid in Lyman's pouch.    The left kidney not visualized due to extensive amount of bowel gas and inability to turn patient.    Visualized segments of abdominal aorta are within normal limits by sonographic criteria. IVC is unremarkable.  .  IMPRESSION:   Avascular and mildly echogenic soft tissue mass surrounding portal vein, biliary ducts, and RIGHT hepatic artery of indeterminate etiology. FOLLOW-UP CONTRAST CT SCAN RECOMMENDED.,  Status post cholecystectomy.  Mild splenomegaly.  Fatty infiltration ofliver.

## 2020-06-26 NOTE — PROGRESS NOTE ADULT - SUBJECTIVE AND OBJECTIVE BOX
Pt was seen and examined at bedside as RN called to eval. for agitation/pain control. Pt is c/o pain all over body and screaming and agitated. Pt received Oxycodone PO earlier without any pain relief. Ativan 2 mg IM x 1 ordered.     Vital Signs Last 24 Hrs  T(C): 36.6 (26 Jun 2020 18:40), Max: 36.9 (26 Jun 2020 00:58)  T(F): 97.8 (26 Jun 2020 18:40), Max: 98.4 (26 Jun 2020 00:58)  HR: 120 (26 Jun 2020 22:00) (72 - 120)  BP: 126/80 (26 Jun 2020 22:00) (101/66 - 137/88)  BP(mean): 90 (26 Jun 2020 22:00) (73 - 101)  RR: 34 (26 Jun 2020 22:00) (15 - 35)  SpO2: 98% (26 Jun 2020 22:00) (98% - 100%)    D/w RN.

## 2020-06-26 NOTE — PROGRESS NOTE ADULT - ASSESSMENT
40 yo female with hx of SLE, RA, ( formerly on MTX up to year ago - followed by Dr Del Rosario) , hx of IVDA and now heroin use  now presengint with weakness and found to be MRSA bacteremia and renal eval called for elevated creatinine    NATALIA vs NATALIA on CKD??     no previous labs ( has not seen physician > year)    multifactorial etiologies - hx of SLE and Raynaud's and in setting of bactaremia and lactic acidosis , cocaine use and chronic NSAID use    severe hypoalbuminemia noted  - chronic protein malnutrition vs liver    urine protein 1.9 gram - NOT nephrotic syndrome     continue with gentle IVF - monitor for third spacing with severe hypoalbuminemia   check urine lytes, protein/cr ratio    renal imaging noted- mass surrounding the portal vein, hepatic artery, and biliary duct   - Consider GI evaluation    - NO V contrast due to renal function    - SLE + serologies   - IV abx per ID/ cardio eval to r/o SBE    keep SBP > 110 for renal perfusion as able    oral protein supplementation    no ACE or ARB      d/w Dr RADHA Brown regarding abdominal mass on sono   d/w CCU RN      Thank you for the courtesy of this consult. We will follow this patient with you.   Management is subject to change if new information becomes available or patient condition changes.

## 2020-06-26 NOTE — PROGRESS NOTE ADULT - SUBJECTIVE AND OBJECTIVE BOX
Pt is a 38 yo female with a pmh/o lupus, RA, reynards, epilepsy (last seizure 8 yrs ago), IVDA (clean for 4 years), who presented to ED today due to weakness. Pt states that her grandmother  approx. 3 months ago and for the past almost 2 months she has been snorting two bags of heroin a day. Pt states her heroin use is partially due to depression and anxiety since gma passed away however also due to financial reasons as can no longer afford oxycodone which she had been taking for her lupus/RA chronic pain as Rx. Pt states she has wounds all over her body from falls, ulcers from previous attempted injection sites, which are not healing and bleed (approx 2 tbs (RUE wound)) daily. Pt describes weakness as fatigue, endorses sob with exertion, weight loss due to decreased appetite from depression and drug use, palpitations. Denies n/v/d, constipation, HA, hematemesis, hematochezia, dark stools, abd pain, cp, fevers, cough, leg swelling.   In ED pt found to have lactate 3.9 with Hgb of 5.5, , T 100.4.       20: Patient seen and examined. Tired and weak. Discussed with Dr Hancock and Dr Pena. Poor IV access.       Vital Signs Last 24 Hrs  T(C): 36.1 (2020 06:10), Max: 36.9 (2020 00:58)  T(F): 97 (2020 06:10), Max: 98.4 (2020 00:58)  HR: 93 (2020 12:00) (72 - 116)  BP: 115/91 (2020 12:00) (92/60 - 135/86)  BP(mean): 96 (2020 12:00) (67 - 97)  RR: 17 (2020 12:00) (15 - 35)  SpO2: 100% (2020 12:00) (98% - 100%)        PHYSICAL EXAM:   · CONSTITUTIONAL: Pale appearing, awake, alert, oriented to person, place, time/situation and in no apparent distress. Weak, malnurished	  · ENMT: Airway patent, Nasal mucosa clear. Mouth with normal mucosa. Throat has no vesicles, no oropharyngeal exudates and uvula is midline.	  · EYES: Clear bilaterally, pupils equal, round and reactive to light.	  · CARDIAC: Normal rate, regular rhythm.  Heart sounds S1, S2.  No murmurs, rubs or gallops.	  · RESPIRATORY: Breath sounds clear and equal bilaterally.	  · GASTROINTESTINAL: Abdomen soft, non-tender, no guarding.	  · MUSCULOSKELETAL: Spine appears normal, range of motion is not limited, no muscle or joint tenderness	  · NEUROLOGICAL: Alert and oriented, no focal deficits, no motor or sensory deficits.	  · SKIN: Skin normal color for race, warm, dry and intact. No evidence of rash. BL 1+ edema to lower extremities. New and old excoriations throughout body. cellulitis to the right 5th toe. BL upper extremities wrapped in old dirty guaze	                                             8.3    14.07 )-----------( 74       ( 2020 05:54 )             24.9     2020 05:54    134    |  105    |  54     ----------------------------<  48     4.3     |  18     |  2.36     Ca    7.7        2020 05:54  Mg     1.7       2020 06:30        MEDICATIONS  (STANDING):  cefTRIAXone Injectable. 1000 milliGRAM(s) IV Push every 24 hours  collagenase Ointment 1 Application(s) Topical daily  silver sulfADIAZINE 1% Cream 1 Application(s) Topical daily  sodium chloride 0.9%. 1000 milliLiter(s) (100 mL/Hr) IV Continuous <Continuous>  vancomycin  IVPB 750 milliGRAM(s) IV Intermittent every 24 hours    MEDICATIONS  (PRN):  acetaminophen   Tablet .. 650 milliGRAM(s) Oral every 4 hours PRN Temp greater or equal to 38C (100.4F), Mild Pain (1 - 3)  LORazepam     Tablet 0.5 milliGRAM(s) Oral every 8 hours PRN Agitation  ondansetron Injectable 4 milliGRAM(s) IV Push every 6 hours PRN Nausea            Assessment and Plan:   Assessment:  · Assessment		  38 yo female with current heroin abuse (snorting) admitted with:    Cellulitis complicated by MRSA sepsis in setting of IVDA, multiple sites cellulitis  -broad spectrum abx as per ID  -Follow blood cultures-MRSA  -Echo: no vegetations  -Will ask cardiology eval for GRAHAM  -Wound care consult appreciated  -turn and repostition q 4 hrs  -fall precautions  -SW consult, declined psych  -pt a smoker, declined patch  -ID consult appreciated  -hold chemical AC due to anemia, VCD boots for DVT ppx  -Follow lactate in am  -Follow blood cultures in 1 or 2 days time.     Symptomatic anemia-acute blood loss anmeia  -S/P 2 units of PRBCs, follow H/H  -bleeding due to weeping wounds for >3 weeks, approx.  -guiac neg      Severe Protein calorie malnutrition with hypoglycemia  -Nutrition eval apprecaited  -Encourage po intake    NATALIA, possibly underlying CKD, unknown renal function  -Continue hydration, decrease rate  -avoid nephrotoxic medications  -monitor renal function on serum chemistry  -i/o's  -Renal eval appreciated    Abdominal mass-portal vein and right hepatic artery  ?septic   Follow  Cannot order CT with contrast due to NATALIA    Lupus  -previously on oxycodone 5mg for chronic pain, now using heroin as could no longer afford  -declines further opioid use at this time  -tylenol prn for pain    Anxiety/Depression  -consider psych consult, declined  -no Suicidal or homicidal ideations     RA  -no longer on MTX due to financial reasons  -supportive care    Epilepsy  -last seizure >8 yrs ago  -seizure precautions

## 2020-06-27 DIAGNOSIS — R93.1 ABNORMAL FINDINGS ON DIAGNOSTIC IMAGING OF HEART AND CORONARY CIRCULATION: ICD-10-CM

## 2020-06-27 DIAGNOSIS — J96.01 ACUTE RESPIRATORY FAILURE WITH HYPOXIA: ICD-10-CM

## 2020-06-27 LAB
ADD ON TEST-SPECIMEN IN LAB: SIGNIFICANT CHANGE UP
ALBUMIN SERPL ELPH-MCNC: 1.1 G/DL — LOW (ref 3.3–5)
ALP SERPL-CCNC: 328 U/L — HIGH (ref 40–120)
ALT FLD-CCNC: 25 U/L — SIGNIFICANT CHANGE UP (ref 12–78)
ANION GAP SERPL CALC-SCNC: 13 MMOL/L — SIGNIFICANT CHANGE UP (ref 5–17)
ANION GAP SERPL CALC-SCNC: 9 MMOL/L — SIGNIFICANT CHANGE UP (ref 5–17)
AST SERPL-CCNC: 78 U/L — HIGH (ref 15–37)
BASE EXCESS BLDA CALC-SCNC: -19.8 MMOL/L — LOW (ref -2–2)
BASE EXCESS BLDA CALC-SCNC: -22.2 MMOL/L — LOW (ref -2–2)
BILIRUB SERPL-MCNC: 0.8 MG/DL — SIGNIFICANT CHANGE UP (ref 0.2–1.2)
BLOOD GAS COMMENTS ARTERIAL: SIGNIFICANT CHANGE UP
BUN SERPL-MCNC: 57 MG/DL — HIGH (ref 7–23)
BUN SERPL-MCNC: 57 MG/DL — HIGH (ref 7–23)
CALCIUM SERPL-MCNC: 7.2 MG/DL — LOW (ref 8.5–10.1)
CALCIUM SERPL-MCNC: 7.5 MG/DL — LOW (ref 8.5–10.1)
CHLORIDE SERPL-SCNC: 102 MMOL/L — SIGNIFICANT CHANGE UP (ref 96–108)
CHLORIDE SERPL-SCNC: 106 MMOL/L — SIGNIFICANT CHANGE UP (ref 96–108)
CO2 SERPL-SCNC: 17 MMOL/L — LOW (ref 22–31)
CO2 SERPL-SCNC: 26 MMOL/L — SIGNIFICANT CHANGE UP (ref 22–31)
CREAT SERPL-MCNC: 2.39 MG/DL — HIGH (ref 0.5–1.3)
CREAT SERPL-MCNC: 2.54 MG/DL — HIGH (ref 0.5–1.3)
GAS PNL BLDA: SIGNIFICANT CHANGE UP
GAS PNL BLDA: SIGNIFICANT CHANGE UP
GLUCOSE SERPL-MCNC: 106 MG/DL — HIGH (ref 70–99)
GLUCOSE SERPL-MCNC: 97 MG/DL — SIGNIFICANT CHANGE UP (ref 70–99)
HAV IGM SER-ACNC: SIGNIFICANT CHANGE UP
HBV CORE IGM SER-ACNC: SIGNIFICANT CHANGE UP
HBV SURFACE AG SER-ACNC: SIGNIFICANT CHANGE UP
HCO3 BLDA-SCNC: 12 MMOL/L — LOW (ref 21–29)
HCO3 BLDA-SCNC: 7 MMOL/L — LOW (ref 21–29)
HCT VFR BLD CALC: 28 % — LOW (ref 34.5–45)
HCV AB S/CO SERPL IA: 0.27 S/CO — SIGNIFICANT CHANGE UP (ref 0–0.99)
HCV AB SERPL-IMP: SIGNIFICANT CHANGE UP
HGB BLD-MCNC: 9.3 G/DL — LOW (ref 11.5–15.5)
LACTATE SERPL-SCNC: 7.5 MMOL/L — CRITICAL HIGH (ref 0.7–2)
MAGNESIUM SERPL-MCNC: 1.8 MG/DL — SIGNIFICANT CHANGE UP (ref 1.6–2.6)
MCHC RBC-ENTMCNC: 29.1 PG — SIGNIFICANT CHANGE UP (ref 27–34)
MCHC RBC-ENTMCNC: 33.2 GM/DL — SIGNIFICANT CHANGE UP (ref 32–36)
MCV RBC AUTO: 87.5 FL — SIGNIFICANT CHANGE UP (ref 80–100)
PCO2 BLDA: 28 MMHG — LOW (ref 32–46)
PCO2 BLDA: 66 MMHG — HIGH (ref 32–46)
PH BLDA: 6.9 — CRITICAL LOW (ref 7.35–7.45)
PH BLDA: 7.01 — CRITICAL LOW (ref 7.35–7.45)
PHOSPHATE SERPL-MCNC: 3.3 MG/DL — SIGNIFICANT CHANGE UP (ref 2.5–4.5)
PLATELET # BLD AUTO: 54 K/UL — LOW (ref 150–400)
PO2 BLDA: 105 MMHG — SIGNIFICANT CHANGE UP (ref 74–108)
PO2 BLDA: 66 MMHG — LOW (ref 74–108)
POTASSIUM SERPL-MCNC: 4.5 MMOL/L — SIGNIFICANT CHANGE UP (ref 3.5–5.3)
POTASSIUM SERPL-MCNC: 4.9 MMOL/L — SIGNIFICANT CHANGE UP (ref 3.5–5.3)
POTASSIUM SERPL-SCNC: 4.5 MMOL/L — SIGNIFICANT CHANGE UP (ref 3.5–5.3)
POTASSIUM SERPL-SCNC: 4.9 MMOL/L — SIGNIFICANT CHANGE UP (ref 3.5–5.3)
PROT SERPL-MCNC: 5.2 GM/DL — LOW (ref 6–8.3)
RBC # BLD: 3.2 M/UL — LOW (ref 3.8–5.2)
RBC # FLD: 17.1 % — HIGH (ref 10.3–14.5)
SAO2 % BLDA: 71 % — LOW (ref 92–96)
SAO2 % BLDA: 93 % — SIGNIFICANT CHANGE UP (ref 92–96)
SARS-COV-2 RNA SPEC QL NAA+PROBE: SIGNIFICANT CHANGE UP
SODIUM SERPL-SCNC: 136 MMOL/L — SIGNIFICANT CHANGE UP (ref 135–145)
SODIUM SERPL-SCNC: 137 MMOL/L — SIGNIFICANT CHANGE UP (ref 135–145)
VANCOMYCIN TROUGH SERPL-MCNC: 19.8 UG/ML — SIGNIFICANT CHANGE UP (ref 10–20)
WBC # BLD: 14.64 K/UL — HIGH (ref 3.8–10.5)
WBC # FLD AUTO: 14.64 K/UL — HIGH (ref 3.8–10.5)

## 2020-06-27 PROCEDURE — 99291 CRITICAL CARE FIRST HOUR: CPT

## 2020-06-27 PROCEDURE — 71045 X-RAY EXAM CHEST 1 VIEW: CPT | Mod: 26

## 2020-06-27 PROCEDURE — 99233 SBSQ HOSP IP/OBS HIGH 50: CPT

## 2020-06-27 PROCEDURE — 93306 TTE W/DOPPLER COMPLETE: CPT | Mod: 26

## 2020-06-27 PROCEDURE — 93010 ELECTROCARDIOGRAM REPORT: CPT

## 2020-06-27 PROCEDURE — 99292 CRITICAL CARE ADDL 30 MIN: CPT

## 2020-06-27 RX ORDER — MAGNESIUM SULFATE 500 MG/ML
2 VIAL (ML) INJECTION ONCE
Refills: 0 | Status: COMPLETED | OUTPATIENT
Start: 2020-06-27 | End: 2020-06-27

## 2020-06-27 RX ORDER — SODIUM CHLORIDE 9 MG/ML
1000 INJECTION, SOLUTION INTRAVENOUS
Refills: 0 | Status: DISCONTINUED | OUTPATIENT
Start: 2020-06-27 | End: 2020-07-09

## 2020-06-27 RX ORDER — PANTOPRAZOLE SODIUM 20 MG/1
40 TABLET, DELAYED RELEASE ORAL DAILY
Refills: 0 | Status: DISCONTINUED | OUTPATIENT
Start: 2020-06-27 | End: 2020-07-03

## 2020-06-27 RX ORDER — ALBUMIN HUMAN 25 %
50 VIAL (ML) INTRAVENOUS ONCE
Refills: 0 | Status: COMPLETED | OUTPATIENT
Start: 2020-06-27 | End: 2020-06-27

## 2020-06-27 RX ORDER — PIPERACILLIN AND TAZOBACTAM 4; .5 G/20ML; G/20ML
3.38 INJECTION, POWDER, LYOPHILIZED, FOR SOLUTION INTRAVENOUS ONCE
Refills: 0 | Status: COMPLETED | OUTPATIENT
Start: 2020-06-27 | End: 2020-06-27

## 2020-06-27 RX ORDER — PHENYLEPHRINE HYDROCHLORIDE 10 MG/ML
0.5 INJECTION INTRAVENOUS
Qty: 40 | Refills: 0 | Status: DISCONTINUED | OUTPATIENT
Start: 2020-06-27 | End: 2020-07-04

## 2020-06-27 RX ORDER — DEXTROSE 50 % IN WATER 50 %
15 SYRINGE (ML) INTRAVENOUS ONCE
Refills: 0 | Status: DISCONTINUED | OUTPATIENT
Start: 2020-06-27 | End: 2020-07-09

## 2020-06-27 RX ORDER — DEXTROSE 50 % IN WATER 50 %
50 SYRINGE (ML) INTRAVENOUS ONCE
Refills: 0 | Status: COMPLETED | OUTPATIENT
Start: 2020-06-27 | End: 2020-06-27

## 2020-06-27 RX ORDER — SODIUM CHLORIDE 9 MG/ML
1000 INJECTION, SOLUTION INTRAVENOUS
Refills: 0 | Status: DISCONTINUED | OUTPATIENT
Start: 2020-06-27 | End: 2020-06-28

## 2020-06-27 RX ORDER — PIPERACILLIN AND TAZOBACTAM 4; .5 G/20ML; G/20ML
3.38 INJECTION, POWDER, LYOPHILIZED, FOR SOLUTION INTRAVENOUS EVERY 8 HOURS
Refills: 0 | Status: COMPLETED | OUTPATIENT
Start: 2020-06-27 | End: 2020-07-03

## 2020-06-27 RX ORDER — CHLORHEXIDINE GLUCONATE 213 G/1000ML
15 SOLUTION TOPICAL EVERY 12 HOURS
Refills: 0 | Status: DISCONTINUED | OUTPATIENT
Start: 2020-06-27 | End: 2020-07-31

## 2020-06-27 RX ORDER — FUROSEMIDE 40 MG
60 TABLET ORAL ONCE
Refills: 0 | Status: COMPLETED | OUTPATIENT
Start: 2020-06-27 | End: 2020-06-27

## 2020-06-27 RX ORDER — DEXTROSE 50 % IN WATER 50 %
12.5 SYRINGE (ML) INTRAVENOUS ONCE
Refills: 0 | Status: DISCONTINUED | OUTPATIENT
Start: 2020-06-27 | End: 2020-07-09

## 2020-06-27 RX ORDER — SODIUM BICARBONATE 1 MEQ/ML
50 SYRINGE (ML) INTRAVENOUS ONCE
Refills: 0 | Status: COMPLETED | OUTPATIENT
Start: 2020-06-27 | End: 2020-06-27

## 2020-06-27 RX ORDER — GLUCAGON INJECTION, SOLUTION 0.5 MG/.1ML
1 INJECTION, SOLUTION SUBCUTANEOUS ONCE
Refills: 0 | Status: DISCONTINUED | OUTPATIENT
Start: 2020-06-27 | End: 2020-07-31

## 2020-06-27 RX ORDER — PROPOFOL 10 MG/ML
10 INJECTION, EMULSION INTRAVENOUS
Qty: 1000 | Refills: 0 | Status: DISCONTINUED | OUTPATIENT
Start: 2020-06-27 | End: 2020-07-09

## 2020-06-27 RX ORDER — FUROSEMIDE 40 MG
80 TABLET ORAL ONCE
Refills: 0 | Status: COMPLETED | OUTPATIENT
Start: 2020-06-27 | End: 2020-06-27

## 2020-06-27 RX ORDER — FUROSEMIDE 40 MG
40 TABLET ORAL ONCE
Refills: 0 | Status: COMPLETED | OUTPATIENT
Start: 2020-06-27 | End: 2020-06-27

## 2020-06-27 RX ORDER — NOREPINEPHRINE BITARTRATE/D5W 8 MG/250ML
0.05 PLASTIC BAG, INJECTION (ML) INTRAVENOUS
Qty: 8 | Refills: 0 | Status: DISCONTINUED | OUTPATIENT
Start: 2020-06-27 | End: 2020-06-30

## 2020-06-27 RX ORDER — DEXTROSE 50 % IN WATER 50 %
25 SYRINGE (ML) INTRAVENOUS ONCE
Refills: 0 | Status: DISCONTINUED | OUTPATIENT
Start: 2020-06-27 | End: 2020-07-09

## 2020-06-27 RX ORDER — SODIUM CHLORIDE 9 MG/ML
1000 INJECTION, SOLUTION INTRAVENOUS
Refills: 0 | Status: DISCONTINUED | OUTPATIENT
Start: 2020-06-27 | End: 2020-06-27

## 2020-06-27 RX ADMIN — Medication 1 MILLIGRAM(S): at 09:40

## 2020-06-27 RX ADMIN — CHLORHEXIDINE GLUCONATE 15 MILLILITER(S): 213 SOLUTION TOPICAL at 17:10

## 2020-06-27 RX ADMIN — Medication 250 MILLIGRAM(S): at 06:34

## 2020-06-27 RX ADMIN — Medication 100 MILLIGRAM(S): at 09:39

## 2020-06-27 RX ADMIN — SODIUM CHLORIDE 75 MILLILITER(S): 9 INJECTION, SOLUTION INTRAVENOUS at 15:21

## 2020-06-27 RX ADMIN — Medication 1 APPLICATION(S): at 17:05

## 2020-06-27 RX ADMIN — SODIUM CHLORIDE 75 MILLILITER(S): 9 INJECTION, SOLUTION INTRAVENOUS at 06:33

## 2020-06-27 RX ADMIN — Medication 650 MILLIGRAM(S): at 21:45

## 2020-06-27 RX ADMIN — SODIUM CHLORIDE 75 MILLILITER(S): 9 INJECTION, SOLUTION INTRAVENOUS at 23:09

## 2020-06-27 RX ADMIN — PIPERACILLIN AND TAZOBACTAM 25 GRAM(S): 4; .5 INJECTION, POWDER, LYOPHILIZED, FOR SOLUTION INTRAVENOUS at 17:00

## 2020-06-27 RX ADMIN — Medication 80 MILLIGRAM(S): at 05:49

## 2020-06-27 RX ADMIN — Medication 50 MILLILITER(S): at 06:21

## 2020-06-27 RX ADMIN — Medication 50 MILLILITER(S): at 15:21

## 2020-06-27 RX ADMIN — Medication 1 APPLICATION(S): at 17:04

## 2020-06-27 RX ADMIN — PIPERACILLIN AND TAZOBACTAM 200 GRAM(S): 4; .5 INJECTION, POWDER, LYOPHILIZED, FOR SOLUTION INTRAVENOUS at 06:10

## 2020-06-27 RX ADMIN — CHLORHEXIDINE GLUCONATE 15 MILLILITER(S): 213 SOLUTION TOPICAL at 06:10

## 2020-06-27 RX ADMIN — PROPOFOL 3.26 MICROGRAM(S)/KG/MIN: 10 INJECTION, EMULSION INTRAVENOUS at 22:30

## 2020-06-27 RX ADMIN — PHENYLEPHRINE HYDROCHLORIDE 10.2 MICROGRAM(S)/KG/MIN: 10 INJECTION INTRAVENOUS at 10:13

## 2020-06-27 RX ADMIN — Medication 40 MILLIGRAM(S): at 03:33

## 2020-06-27 RX ADMIN — PANTOPRAZOLE SODIUM 40 MILLIGRAM(S): 20 TABLET, DELAYED RELEASE ORAL at 09:39

## 2020-06-27 RX ADMIN — Medication 50 MILLIEQUIVALENT(S): at 06:21

## 2020-06-27 RX ADMIN — Medication 60 MILLIGRAM(S): at 17:09

## 2020-06-27 RX ADMIN — PROPOFOL 3.26 MICROGRAM(S)/KG/MIN: 10 INJECTION, EMULSION INTRAVENOUS at 05:49

## 2020-06-27 RX ADMIN — Medication 50 GRAM(S): at 23:09

## 2020-06-27 NOTE — PROVIDER CONTACT NOTE (CHANGE IN STATUS NOTIFICATION) - ASSESSMENT
Around 0300, pt noted to have change in breathing. Breathing sounded gargled and dyspneic. RR elevated and oxygen saturation in 80's. Pt placed on NRB. PA called and came to see pt at bedside with attending MD. CXR and lasix ordered. Lasix was given. Pt continued to decline.

## 2020-06-27 NOTE — PROGRESS NOTE ADULT - SUBJECTIVE AND OBJECTIVE BOX
39F admitted  - with fatigue, CARRION, weight loss, decreased appetite. Found to have multiple areas of cellulitis, anemia, NATALIA, and sepsis. Pt was admitted to hospital medicine team. Further workup revealed MRSA bacteremia and MRSA UTI.     CCM called emergently by RN due to deterioration and acute change in mental status (unresponsive), hypoxia requiring escalating O2 (+) agonal breathing, unresponsive. BS 50 given 1amp D50 with mild improvement. Pt was subsequently intubated due to hypoxia and ongoing AMS.     above d/w CODY Ivey who emergently evaluated and treated the patient.  Metabolic acidosis and gross anuria prompted initiation of NaHCO3 infusion.  CXR with diffuse B/L infiltrates.    : seen/examined 8:00 am and chart reviewed -F/u ECHO ordered STAT.  Dr Pena from nephrology informed of deterioration/acidosis - potential need for HD.  Spoke with pts "aunt" who is close family friend and apparently only person really involved in helping the patient at this point.  SBP in 80's - started on phenylephrine   Lactate 7.5  D/w Dr Venegas at bedside - EKG unrevealing of significant changes but F/U echo with septal/inferior wall motion abnorms / hypokinesis that wasn't present on ECHO earlier in the week.  Notably EF remains relatively preserved.    Case d/w Dr RADHA Brown who has been caring for the patient.        PMHx IVDU use (heroin), smoker, Raynauds, RA with chronic pain med use, lupus, epilepsy   PAST MEDICAL & SURGICAL HISTORY:  Smoker  Heroin abuse  H/O Raynaud's syndrome  Rheumatoid arthritis  Lupus  Gall bladder stones  H/O appendicitis  H/O tubal ligation    Allergies    morphine (Unknown)    ICU Vital Signs Last 24 Hrs  T(C): 36.4 (2020 06:00), Max: 36.6 (2020 18:40)  T(F): 97.6 (2020 06:00), Max: 97.8 (2020 18:40)  HR: 137 (2020 08:49) (85 - 139)  BP: 95/62 (2020 07:00) (95/62 - 154/105)  BP(mean): 68 (2020 07:00) (67 - 118)  RR: 33 (2020 07:00) (15 - 44)  SpO2: 99% (2020 08:49) (90% - 100%)      Mode: AC/ CMV (Assist Control/ Continuous Mandatory Ventilation)  RR (machine): 20  TV (machine): 350  FiO2: 100  PEEP: 5  ITime: 1  MAP: 10  PIP: 23      I&O's Summary    2020 07:01  -  2020 07:00  --------------------------------------------------------  IN: 320 mL / OUT: 0 mL / NET: 320 mL                              8.3    14.07 )-----------( 74       ( 2020 05:54 )             24.9       06-27    136  |  106  |  57<H>  ----------------------------<  106<H>  4.9   |  17<L>  |  2.54<H>    Ca    7.5<L>      2020 07:20        POCT Blood Glucose.: 106 mg/dL (2020 09:07)  POCT Blood Glucose.: 152 mg/dL (2020 04:54)  POCT Blood Glucose.: 59 mg/dL (2020 04:26)      CARDIAC MARKERS ( 2020 03:35 )  0.127 ng/mL / x     / x     / x     / x        ABG - ( 2020 05:35 )  pH, Arterial: 7.01  pH, Blood: x     /  pCO2: 28    /  pO2: 105   / HCO3: 7     / Base Excess: -22.2 /  SaO2: 93          Urinalysis Basic - ( 2020 21:20 )    Color: Ale / Appearance: Slightly Turbid / S.015 / pH: x  Gluc: x / Ketone: Negative  / Bili: Negative / Urobili: Negative mg/dL   Blood: x / Protein: 30 mg/dL / Nitrite: Negative   Leuk Esterase: Small / RBC: >50 /HPF / WBC 6-10   Sq Epi: x / Non Sq Epi: Many / Bacteria: Many        MEDICATIONS  (STANDING):  chlorhexidine 0.12% Liquid 15 milliLiter(s) Oral Mucosa every 12 hours  collagenase Ointment 1 Application(s) Topical daily  dextrose 5% 1000 milliLiter(s) (75 mL/Hr) IV Continuous <Continuous>  folic acid 1 milliGRAM(s) Oral daily  norepinephrine Infusion 0.05 MICROgram(s)/kG/Min (5.1 mL/Hr) IV Continuous <Continuous>  pantoprazole  Injectable 40 milliGRAM(s) IV Push daily  phenylephrine    Infusion 0.5 MICROgram(s)/kG/Min (10.2 mL/Hr) IV Continuous <Continuous>  piperacillin/tazobactam IVPB.. 3.375 Gram(s) IV Intermittent every 8 hours  propofol Infusion 10 MICROgram(s)/kG/Min (3.26 mL/Hr) IV Continuous <Continuous>  silver sulfADIAZINE 1% Cream 1 Application(s) Topical daily  thiamine 100 milliGRAM(s) Oral daily  vancomycin  IVPB 750 milliGRAM(s) IV Intermittent daily    MEDICATIONS  (PRN):  acetaminophen   Tablet .. 650 milliGRAM(s) Oral every 4 hours PRN Temp greater or equal to 38C (100.4F), Mild Pain (1 - 3)          DVT Prophylaxis: has not been on DVT prophylaxis due to anemia and thrombocytopenia as noted by erik Cedillo    Advanced Directives: FULL  Discussed with: HARRY    Visit Information:  75  minutes of Critical Care time spent providing medical care for patient's acute illness/conditions that impairs at least one vital organ system and/or poses a high risk of imminent or life threatening deterioration in the patient's condition.  It includes time spent reviewing labs, radiology, discussing goals of care with patient and/or family, and discussing the case with a multidisciplinary team in an effort to prevent further life threatening deterioration or end organ damage.  This time is independent of any procedures performed.    ** Time is exclusive of billed procedures and/or teaching and/or routine family updates.

## 2020-06-27 NOTE — PROGRESS NOTE ADULT - SUBJECTIVE AND OBJECTIVE BOX
RN called to report that pt is c/o SOB and desating 80's at RA. Pt was sating normal with RA before. Pt is still c/o pain and not keeping oxygen mask on.  Pt received 6-7 L total IVF on this admission. Stat CXR; b/l pul. edema, waiting for official reading. D/w RN to hold on further IVF and Lasix 40 mg x 1 stat ordered. Troponin ordered. Pt denies any chest pain, palpitation or dizziness.     D/w Dr. Grey.     Plan d/w RN. Pt was seen at bedside with Dr. Grey as RN called to report that pt is c/o SOB and desating 80's at RA. Pt was sating normal with RA before. Pt is still c/o pain and not keeping oxygen mask on.  Pt received 6-7 L total IVF on this admission. Stat CXR; b/l pul. edema, waiting for official reading. D/w RN to hold on further IVF and Lasix 40 mg x 1 stat ordered. Troponin ordered. Pt denies any chest pain, palpitation or dizziness.     Cased/w  Dr. Grey.     Plan d/w RN.

## 2020-06-27 NOTE — PROGRESS NOTE ADULT - ASSESSMENT
39F admitted 6/24 currently suffering from:  severe sepsis and septic shock  MRSA bacteremia, may be secondary to MRSA UTI but IVDA/endocarditis also potential source  acute hypoxemic respiratory failure - ARDS with diffuse B/L infiltrates  AMS - toxic metabolic encephalopathy vs septic encephalopathy  NATALIA with apparent oliguria  thrombocytopenia most likely sepsis related  anemia - chronic  severe metabolic acidosis - lactic acidosis - has been present since admission but now has worsened - appears out of proportion to level of sepsis   ill defined hepatic mass ? abscess  New septal and inferior wall motion abnorms on f/u ECHO 6/27 with preserved EF    Plan at this time is for continued care in CCU  VAP prevention bundle  Titrate FiO2 as possible +/- PEEP per ARDSnet protocol  Full vent support with LTV 6-8cc/kg IDW, currently peaks <27 with plts<30  Utilization of PEEP for alveolar recruitment  Continue Vancomycin, Zosyn initiated due to possible aspiration - concern for PNA with Gram negative organisms  Will d/w ID  PPI, NPO for now - TF after CT abdomen  Strict I/O, Salgado, NaHCO3 infusion intiated but a this point pt will likely need urgent HD.  D/w Dr Pena from nephrology  Considering CT chest/abd/pelvis to better characterize pathology concern for intraabdominal abscess given abd US findings  unfortunately pt cannot have IV contrast due to risk of STEPHANIE in setting of NATALIA - this will likely diminish CT scans utility in this situation.   FS glucose - monitor for hypoglycemia/hypoglycemia treatment protocol  Case d/w "Maame" pts "aunt" - she seems to be only person available to the patient at this time for decision making and said she would let pts uncle know the situation as well.  Discussed need for urgent HD and that situation is poor at this time.  ALL questions answered.

## 2020-06-27 NOTE — CONSULT NOTE ADULT - ASSESSMENT
Impression:  1. sepsis  2. MRSA bacteremia  3. MRSA UTI  4. acute hypoxemic respiratory failure  5. AMS  6. NATALIA    Plan:  Neuro - Sedation neuromuscular blockade to facilitate safe ventilation    CV -  Pressor support as needed to maintain MAP 65           Avoiding fluid challenges          QTC monitoring while on Azithromycin and Hydroxychloroquine.    Pulm -  ARDS-NET 4-6cc/kg IBW TV as able to maintain plateau pressures <30               Prone ventilation consideration as feasible  Pa02/Fi02 < 150 on Fi02 >60% and PEEP at least 5                 Vent bundle Reviewed     GI -  PPI  Enteric feeds as tolerated in tandem with NMB and prone ventilation    Renal - Even to negative fluid balance as tolerated by hemodynamics and renal fx.  Feeds to be provided in lieu of IVF.     Heme -  Pharmacologic DVT PPx  in addition to SCD's    ID - ABX discontinuation based on discussion with ID in conjunction with clinical features, culture data, and judicious procalcitonin monitoring.      Endo -  Aggressive glycemic control to limit FS glucose to < 180mg/dl. Impression:  1. sepsis  2. MRSA bacteremia  3. MRSA UTI  4. acute hypoxemic respiratory failure  5. AMS  6. NATALIA  7. thrombocytopenia    Plan:  Neuro - Sedation neuromuscular blockade to facilitate safe ventilation    CV -  will perform POCUS to assess volume status          CXR with bilateral infiltrates ? pulm edema vs developing ARDS          will give 80mg Lasix x 1 now    Pulm -  full vent support with LTV 6-8cc/kg IDW, currently peaks <27 with plts<30             active titration of FiO2 for sats>90%             utilization of PEEP for alveolar recruitment             check STAT ABG             Full vent bundle added    GI -  PPI  Enteric feeds as tolerated in tandem with NMB and prone ventilation    Renal - Even to negative fluid balance as tolerated by hemodynamics and renal fx.  Feeds to be provided in lieu of IVF.     Heme -  Pharmacologic DVT PPx  in addition to SCD's    ID - ABX discontinuation based on discussion with ID in conjunction with clinical features, culture data, and judicious procalcitonin monitoring.      Endo -  Aggressive glycemic control to limit FS glucose to < 180mg/dl. Impression:  1. sepsis  2. MRSA bacteremia  3. MRSA UTI  4. acute hypoxemic respiratory failure  5. AMS - suspect hypoglycemia and toxic metabolic  6. NATALIA - Cr plateau but with developing oliguria  7. thrombocytopenia most likely sepsis related  8. anemia    Plan:  Neuro - Sedation to facilitate safe ventilation, no signs of seizure activity surrounding events, will use propofol for sedation given its ability to suppress if               subclinical, neurochecks [id number], Head CT    CV -  will perform POCUS to assess volume status          CXR with bilateral infiltrates ? pulm edema vs developing ARDS          will give 80mg Lasix x 1 now    Pulm -  full vent support with LTV 6-8cc/kg IDW, currently peaks <27 with plts<30             active titration of FiO2 for sats>90%             utilization of PEEP for alveolar recruitment             check STAT ABG             Full vent bundle added             Scx             will add broader coverage with Zosyn for now for aspiration coverage    GI -  PPI, NPO for now, will start gastric feeds following CT evaluation of abd    Renal - Cr remains elevated, urine output minimal, diuretic challenge, avoid MAP<65, renally adjust all meds, avoid nephrotoxins, strict I/OS, reynoso, ABG to              assess pH, may need consideration for HD pending results.    Heme -  Pharmacologic DVT PPx held due to thrombocytopenia, transfuse plts for <10K or <50k with spontaneous bleeding, transfuse pRBCs for Hbg<7 or                signs of active bleeding.    ID - Cont IV abx, broadening coverage for GN (Zosyn and Vanco), Bcx/UCx + MRSA, check SCx, will do CT noncont (given elevated Cr) Chest/abd/pelvis to evaluate pathology bettter, concern for intrabdominal abscess given abd us findings, multiple areas of cellulitis as presumed source, ? further source control needs pending imaging findings. ABX adjustment based on discussion with ID in conjunction with clinical features and culture data.    Endo -  BS normalized following D50, change BGM to [id number], will add D5 if trends downward. Impression:  1. sepsis  2. MRSA bacteremia  3. MRSA UTI  4. acute hypoxemic respiratory failure  5. AMS - suspect hypoglycemia and toxic metabolic  6. NATALIA - Cr plateau but with developing oliguria  7. thrombocytopenia most likely sepsis related  8. anemia    Plan:  Neuro - Sedation to facilitate safe ventilation, no signs of seizure activity surrounding events, will use propofol for sedation given its ability to suppress if               subclinical, neurochecks [id number], Head CT    CV -  will perform POCUS to assess volume status          CXR with bilateral infiltrates ? pulm edema vs developing ARDS          will give 80mg Lasix x 1 now    Pulm -  full vent support with LTV 6-8cc/kg IDW, currently peaks <27 with plts<30             active titration of FiO2 for sats>90%             utilization of PEEP for alveolar recruitment             check STAT ABG             Full vent bundle added             Scx             will add broader coverage with Zosyn for now for aspiration coverage    GI -  PPI, NPO for now, will start gastric feeds following CT evaluation of abd    Renal - Cr remains elevated, urine output minimal, diuretic challenge, avoid MAP<65, renally adjust all meds, avoid nephrotoxins, strict I/OS, reynoso, ABG to              assess pH, may need consideration for HD pending results.    Heme -  Pharmacologic DVT PPx held due to thrombocytopenia, transfuse plts for <10K or <50k with spontaneous bleeding, transfuse pRBCs for Hbg<7 or                signs of active bleeding.    ID - Cont IV abx, broadening coverage for GN (Zosyn and Vanco), Bcx/UCx + MRSA, check SCx, will do CT noncont (given elevated Cr) Chest/abd/pelvis to evaluate pathology bettter, concern for intrabdominal abscess given abd us findings, multiple areas of cellulitis as presumed source, ? further source control needs pending imaging findings. ABX adjustment based on discussion with ID in conjunction with clinical features and culture data.    Endo -  BS normalized following D50, change BGM to [id number], will add D5 if trends downward.     Attempts made to notify contacts [facility] and/or [place] regarding events. No answer. Will have day team attempt again later. Impression:  1. sepsis  2. MRSA bacteremia  3. MRSA UTI  4. acute hypoxemic respiratory failure  5. AMS - suspect hypoglycemia and toxic metabolic  6. NATALIA - Cr plateau but with developing oliguria  7. thrombocytopenia most likely sepsis related  8. anemia    Plan:  Neuro - Sedation to facilitate safe ventilation, no signs of seizure activity surrounding events, will use propofol for sedation given its ability to suppress if               subclinical, neurochecks [id number], Head CT    CV -  will perform POCUS to assess volume status          CXR with bilateral infiltrates ? pulm edema vs developing ARDS          will give 80mg Lasix x 1 now    Pulm -  full vent support with LTV 6-8cc/kg IDW, currently peaks <27 with plts<30             active titration of FiO2 for sats>90%             utilization of PEEP for alveolar recruitment             check STAT ABG             Full vent bundle added             Scx             will add broader coverage with Zosyn for now for aspiration coverage    GI -  PPI, NPO for now, will start gastric feeds following CT evaluation of abd    Renal - Cr remains elevated, urine output minimal, diuretic challenge, avoid MAP<65, renally adjust all meds, avoid nephrotoxins, strict I/OS, reynoso, ABG              shows severe metabolic acidosis, add bicarb infusion, repeat lactate    Heme -  Pharmacologic DVT PPx held due to thrombocytopenia, transfuse plts for <10K or <50k with spontaneous bleeding, transfuse pRBCs for Hbg<7 or                signs of active bleeding.    ID - Cont IV abx, broadening coverage for GN (Zosyn and Vanco), Bcx/UCx + MRSA, check SCx, will do CT noncont (given elevated Cr) Chest/abd/pelvis to evaluate pathology bettter, concern for intrabdominal abscess given abd us findings, multiple areas of cellulitis as presumed source, ? further source control needs pending imaging findings. ABX adjustment based on discussion with ID in conjunction with clinical features and culture data.    Endo -  BS normalized following D50, change BGM to [id number], will add D5 if trends downward.     Attempts made to notify contacts [facility] and/or [place] regarding events. No answer. Will have day team attempt again later. Impression:  1. sepsis  2. MRSA bacteremia  3. MRSA UTI  4. acute hypoxemic respiratory failure  5. AMS - suspect hypoglycemia and toxic metabolic  6. NATALIA - Cr plateau but with developing oliguria  7. thrombocytopenia most likely sepsis related  8. anemia  9. severe metabolic acidosis    Plan:  Neuro - Sedation to facilitate safe ventilation, no signs of seizure activity surrounding events, will use propofol for sedation given its ability to suppress if               subclinical, neurochecks [id number], avoid long acting neurosuppressants, Head CT    CV -   POCUS to assess volume status          Echo 6/24 with nml function and no overt signs of valve veg, pending GRAHAM on Monday as per Cardio          CXR with bilateral infiltrates ? pulm edema vs developing ARDS, CT chest noncont to evaluate          will give 80mg Lasix x 1 now    Pulm -  full vent support with LTV 6-8cc/kg IDW, currently peaks <27 with plts<30             active titration of FiO2 for sats>90%             utilization of PEEP for alveolar recruitment             check STAT ABG             Full vent bundle added             Scx             add broader coverage with Zosyn given probable aspiration    GI -  PPI, NPO for now, will start gastric feeds following CT evaluation of abd    Renal - Cr remains elevated, urine output minimal, diuretic challenge, avoid MAP<65, renally adjust all meds, avoid nephrotoxins, strict I/OS, reynoso, ABG              shows severe metabolic acidosis, add bicarb infusion, repeat lactate, renal consult    Heme -  Pharmacologic DVT PPx held due to thrombocytopenia, transfuse plts for <10K or <50k with spontaneous bleeding, transfuse pRBCs for Hbg<7 or                signs of active bleeding.    ID - Cont IV abx, broadening coverage for GN (Zosyn and Vanco), Bcx/UCx + MRSA, check SCx, will do CT noncont (given elevated Cr) Chest/abd/pelvis to evaluate pathology bettter, concern for intrabdominal abscess given abd us findings, multiple areas of cellulitis as presumed source, ? further source control needs pending imaging findings. ABX adjustment based on discussion with ID in conjunction with clinical features and culture data.    Endo -  BS normalized following D50, change BGM to [id number], adding D5 infusion    Attempts made to notify contacts [facility] and/or [place] regarding events. No answer. Will have day team attempt again later.

## 2020-06-27 NOTE — PROGRESS NOTE ADULT - ASSESSMENT
40 yo female with hx of SLE, RA, ( formerly on MTX up to year ago - followed by Dr Del Rosario) , hx of IVDA and now heroin use  now presengint with weakness and found to be MRSA bacteremia and renal eval called for elevated creatinine    NATALIA vs NATALIA on CKD      multifactorial etiologies - hx of SLE and Raynaud's and in setting of bactaremia and lactic acidosis , cocaine use and chronic NSAID use       severe hypoalbuminemia noted  - chronic protein malnutrition vs liver       urine protein 1.9 gram - NOT nephrotic syndrome       renal imaging noted- neg hydro       - NO IV contrast due to renal function    - SLE + serologies   - IV abx per ID/ cardio eval to r/o SBE    - keep SBP > 110 for renal perfusion as able    - oral protein supplementation    - no ACE or ARB     MRSA bacteremia with septic shock now hypoxic resp failure w possible ARDS  minimal UOP response to diuretics and metabolic acidosis on ABG   d/w Dr Comer and pt family (Gregoria Porter - Aunt) is relative in [place]  Uncle Douglas Mckeon - left message again     Gregoria was advised on current medical condition and the need for likely HD today - she has requested in light of her [place] current medical status and hx of poor quality of life and drug use - she would NOT want agressive measures of HD  I advised her at length that HD would serve to maintain life and could be temporary. She was aware but again refused to give permssion for this.  Dr RADHA Comer had extensive conversation with Aunt and Uncle - douglas about this as well. Uncle does not want to be involved in her care. Pt does not have children or spouse ( ex  not involved) - pt;s mother  .  Will continue to treat medically.     Abdominal Mass on sono    renal imaging noted- mass surrounding the portal vein, hepatic artery, and biliary duct - abscess??    d.w Dr RADHA Brown regarding imaging     Prognosis guarded

## 2020-06-27 NOTE — PROGRESS NOTE ADULT - SUBJECTIVE AND OBJECTIVE BOX
Pt is a 40 yo female with a pm hx of SLE ,RA ( last seen by dr medina 1 year ago?)   ,reynards, epilepsy (last seizure 8 yrs ago), IVDA (clean for 4 years), who presented to ED today due to weakness. Pt states that her grandmother  approx. 3 months ago and for the past almost 2 months she has been snorting two bags of heroin a day. Pt states her heroin use is partially due to depression and anxiety since she passed away however also due to financial reasons as can no longer afford oxycodone which she had been taking for her lupus/RA chronic pain as Rx. Pt states she has wounds all over her body from falls, ulcers from previous attempted injection sites, which are not healing and bleeding    Pt describes weakness as fatigue, endorses sob with exertion, weight loss due to decreased appetite from depression and drug use, palpitations. Denies n/v/d, constipation, HA, hematemesis, hematochezia, dark stools, abd pain, cp, fevers, cough, leg swelling.   In ED pt found to have lactate 3.9 with Hgb of 5.5, , T 100.4. pt was admitted to ICU   renal eval called for elevated Creatinine- no previous Cr available to compare  last seen by a physician 1 year ago . pt denies hx of renal disease in past  pt admits to daily Mobic use previously and now daily Advil use at home      events this AM noted   now intubated    hypoxia on 100 % Fio2   min UOP after 120 mg IV lasix    on abg - pH 7        PAST MEDICAL & SURGICAL HISTORY:  Smoker  Heroin abuse  H/O Raynaud's syndrome  Rheumatoid arthritis  Lupus  Gall bladder stones  H/O appendicitis  H/O tubal ligation      MEDICATIONS  (STANDING):  chlorhexidine 0.12% Liquid 15 milliLiter(s) Oral Mucosa every 12 hours  collagenase Ointment 1 Application(s) Topical daily  dextrose 5% 1000 milliLiter(s) (75 mL/Hr) IV Continuous <Continuous>  dextrose 5%. 1000 milliLiter(s) (50 mL/Hr) IV Continuous <Continuous>  dextrose 50% Injectable 12.5 Gram(s) IV Push once  dextrose 50% Injectable 25 Gram(s) IV Push once  dextrose 50% Injectable 25 Gram(s) IV Push once  folic acid 1 milliGRAM(s) Oral daily  norepinephrine Infusion 0.05 MICROgram(s)/kG/Min (5.1 mL/Hr) IV Continuous <Continuous>  pantoprazole  Injectable 40 milliGRAM(s) IV Push daily  phenylephrine    Infusion 0.5 MICROgram(s)/kG/Min (10.2 mL/Hr) IV Continuous <Continuous>  piperacillin/tazobactam IVPB.. 3.375 Gram(s) IV Intermittent every 8 hours  propofol Infusion 10 MICROgram(s)/kG/Min (3.26 mL/Hr) IV Continuous <Continuous>  silver sulfADIAZINE 1% Cream 1 Application(s) Topical daily  thiamine 100 milliGRAM(s) Oral daily  vancomycin  IVPB 750 milliGRAM(s) IV Intermittent daily    MEDICATIONS  (PRN):  acetaminophen   Tablet .. 650 milliGRAM(s) Oral every 4 hours PRN Temp greater or equal to 38C (100.4F), Mild Pain (1 - 3)  dextrose 40% Gel 15 Gram(s) Oral once PRN Blood Glucose LESS THAN 70 milliGRAM(s)/deciLiter  glucagon  Injectable 1 milliGRAM(s) IntraMuscular once PRN Glucose <70 milliGRAM(s)/deciLiter        Allergies    morphine (Unknown)    Intolerances    ROS:    limited by pt mental status       Vital Signs Last 24 Hrs  T(C): 36.6 (2020 12:15), Max: 36.6 (2020 18:40)  T(F): 97.8 (2020 12:15), Max: 97.8 (2020 18:40)  HR: 140 (2020 12:15) (85 - 140)  BP: 109/63 (2020 12:15) (85/50 - 154/105)  BP(mean): 74 (2020 12:15) (58 - 118)  RR: 0 (2020 07:30) (0 - 44)  SpO2: 95% (2020 12:15) (90% - 100%)      I&O's Detail    2020 07:01  -  2020 07:00  --------------------------------------------------------  IN:    Oral Fluid: 120 mL    sodium chloride 0.9%: 200 mL  Total IN: 320 mL    OUT:  Total OUT: 0 mL    Total NET: 320 mL      2020 07:01  -  2020 12:25  --------------------------------------------------------  IN:    dextrose 5%: 600 mL    phenylephrine   Infusion: 10.2 mL    propofol Infusion: 45.5 mL  Total IN: 655.7 mL    OUT:  Total OUT: 0 mL    Total NET: 655.7 mL      PHYSICAL EXAM:    Constitutional: now intubated   HEENT: MM + dry   Neck: No LAD, No JVD  Respiratory: poor AE   Cardiovascular: S1 and S2  Gastrointestinal: BS+, soft, NT/ND  Extremities: peripheral edema ++ 3-4 w multiple lessions and ulces on upper and lower ext   Neurological: intubated ,    : No Salgado  Skin: multiple lesions/rashes and weeping areas   Access: Not applicable    LABS:                          6.6    16.73 )-----------( 69       ( 2020 08:40 )             21.1                         8.3    14.07 )-----------( 74       ( 2020 05:54 )             24.9     136    |  106    |  57     ----------------------------<  106       2020 07:20  4.9     |  17     |  2.54     134    |  105    |  54     ----------------------------<  48        2020 05:54  4.3     |  18     |  2.36     134    |  104    |  53     ----------------------------<  59        2020 06:30  4.5     |  20     |  2.41     Ca    7.5        2020 07:20  Albumin, Serum: 0.8 g/dL (20 @ 07:05)      Double Stranded DNA Antibody: 428: Method: EIA            Reference Ranges            Interpretation            <= 29    IU/mL     Negative            30 - 75  IU/mL     Borderline            > 75      IU/mL     Positive IU/mL (20 @ 05:54)      C3 Complement, Serum: 54 mg/dL (20 @ 05:54)    C4 Complement, Serum: 10 mg/dL (20 @ 05:54)    HIV-1/2 Combo Result: Nonreact:   Acute Hepatitis Panel (20 @ 07:05)   Hepatitis C Virus S/CO Ratio: 0.29 S/CO    Hepatitis B Core IgM Antibody: Nonreact    Hepatitis B Surface Antigen: Nonreact    Hepatitis A IgM Antibody: Nonreact        Urine Microscopic-Add On (NC) (20 @ 21:20)    Red Blood Cell - Urine: >50 /HPF    White Blood Cell - Urine: 6-10    Bacteria: Many    Comment - Urine: Moderate Trichomonas    Epithelial Cells: Many    Protein/Creatinine Ratio Calculation: 1.9 Ratio (20 @ 21:20)    Sodium, Random Urine: <20:     Urine Studies:  Urinalysis Basic - ( 2020 05:41 )    Color: Ale / Appearance: Slightly Turbid / S.010 / pH: x  Gluc: x / Ketone: Negative  / Bili: Negative / Urobili: 1 mg/dL   Blood: x / Protein: 30 mg/dL / Nitrite: Negative   Leuk Esterase: Moderate / RBC: 11-25 /HPF / WBC 3-5   Sq Epi: x / Non Sq Epi: Negative / Bacteria: Many      RADIOLOGY & ADDITIONAL STUDIES:    INTERPRETATION:  Ultrasound of the abdomen       CLINICAL INFORMATION: Acute renal injury. History of drug abuse. Sepsis. Assess for hydronephrosis. Assess for liver cirrhosis.    TECHNIQUE:   Transcutaneous ultrasonography of the abdomen was performed.    FINDINGS:    No previous examinations are available for review.  There is a hypoechoic complex avascular soft tissue tissue 3.4 x 3.0 x 4.4 cm mass surrounding the portal vein, RIGHT hepatic artery and biliary ducts of indeterminate etiology. Abdominal contrast CT scan recommended to differentiate origin of this mass, either from liver, pancreas or bowel. No recent prior CT scan available for comparison.    The pancreas is grossly intact.    There is diffuse fatty infiltration of liver parenchyma measuring 18 cm in length. No focal mass. Hepatic and portal veins appear patent and are not displaced.  No intrahepatic or ductal dilatation..  The common duct  measures 0.84 cm.       Status post cholecystectomy. .      There is mild splenomegaly, spleen measuring 13.2 cm in length. The right kidney measures 11.8 cm in length.  It demonstrates no mass, calculus or hydronephrosis. Trace fluid in Lyman's pouch.    The left kidney not visualized due to extensive amount of bowel gas and inability to turn patient.    Visualized segments of abdominal aorta are within normal limits by sonographic criteria. IVC is unremarkable.  .  IMPRESSION:   Avascular and mildly echogenic soft tissue mass surrounding portal vein, biliary ducts, and RIGHT hepatic artery of indeterminate etiology. FOLLOW-UP CONTRAST CT SCAN RECOMMENDED.,  Status post cholecystectomy.  Mild splenomegaly.  Fatty infiltration ofliver.

## 2020-06-27 NOTE — PROGRESS NOTE ADULT - SUBJECTIVE AND OBJECTIVE BOX
REASON FOR VISIT:  Possible SBE    HPI:  39 year old woman with a history of Lupus, RA, Raynaud's seizure disorder, anxiety/depression, and drug abuse who presented to the ER on 6/23/2020 with complaints of weakness and fever; diagnosed with MRSA bacteremia --  cardiology was consulted on 6/26 with request for GRAHAM and procedure has been scheduled.    6/27/20:  Overnight events noted and discussed with Dr Comer (acute respiratory failure, lactic acidosis).    MEDICATIONS  (STANDING):  chlorhexidine 0.12% Liquid 15 milliLiter(s) Oral Mucosa every 12 hours  collagenase Ointment 1 Application(s) Topical daily  dextrose 5% 1000 milliLiter(s) (75 mL/Hr) IV Continuous <Continuous>  folic acid 1 milliGRAM(s) Oral daily  norepinephrine Infusion 0.05 MICROgram(s)/kG/Min (5.1 mL/Hr) IV Continuous <Continuous>  pantoprazole  Injectable 40 milliGRAM(s) IV Push daily  phenylephrine    Infusion 0.5 MICROgram(s)/kG/Min (10.2 mL/Hr) IV Continuous <Continuous>  piperacillin/tazobactam IVPB.. 3.375 Gram(s) IV Intermittent every 8 hours  propofol Infusion 10 MICROgram(s)/kG/Min (3.26 mL/Hr) IV Continuous <Continuous>  silver sulfADIAZINE 1% Cream 1 Application(s) Topical daily  thiamine 100 milliGRAM(s) Oral daily  vancomycin  IVPB 750 milliGRAM(s) IV Intermittent daily    MEDICATIONS  (PRN):  acetaminophen   Tablet .. 650 milliGRAM(s) Oral every 4 hours PRN Temp greater or equal to 38C (100.4F), Mild Pain (1 - 3)    Vital Signs Last 24 Hrs  T(C): 36.4 (27 Jun 2020 06:00), Max: 36.6 (26 Jun 2020 18:40)  T(F): 97.6 (27 Jun 2020 06:00), Max: 97.8 (26 Jun 2020 18:40)  HR: 137 (27 Jun 2020 08:49) (85 - 139)  BP: 95/62 (27 Jun 2020 07:00) (95/62 - 154/105)  BP(mean): 68 (27 Jun 2020 07:00) (67 - 118)  RR: 33 (27 Jun 2020 07:00) (15 - 44)  SpO2: 99% (27 Jun 2020 08:49) (90% - 100%)    PHYSICAL EXAM:  Constitutional: Semirecumbent in bed on vent  HEENT: ETT to vent  Pulmonary: Bilateral breath sounds  Cardiovascular: Tachycardic    LABS:       CARDIAC MARKERS ( 27 Jun 2020 03:35 ) 0.127 ng/mL / x     / x     / x     / x                   8.3    14.07 )-----------( 74       ( 26 Jun 2020 05:54 )             24.9     136  |  106  |  57<H>  ----------------------------<  106<H>  4.9   |  17<L>  |  2.54<H>    Ca    7.5<L>      27 Jun 2020 07:20    12 Lead ECG (06.24.20 @ 10:51):  Normal sinus rhythm, Low voltage QRS    TTE Echo Complete w/o Contrast w/ Doppler (06.24.20 @ 14:01):     The left ventricle is normal in size, wall thickness, wall motion and contractility. Estimated left ventricular ejection fraction is 60 %.   The right atrium is normal in size.   A catheter is noted in the right atrium.   The aortic valve is trileaflet with thin pliable leaflets.   The mitral valve leaflets appear thickened.   Trace mitral regurgitation is present.   The tricuspid valve leaflets appear mildly thickened and/or calcified, but open well.   Trace tricuspid valve regurgitation is present.   Trace pulmonic valvular regurgitation is present.    US Abdomen Complete (06.25.20 @ 17:46):     Avascular and mildly echogenic soft tissue mass surrounding portal vein, biliary ducts, and RIGHT hepatic artery of indeterminate etiology. FOLLOW-UP CONTRAST CT SCAN RECOMMENDED.,  Status post cholecystectomy.Mild splenomegaly.Fatty infiltration of liver.    Xray Chest 1 View-PORTABLE IMMEDIATE (06.23.20 @ 22:54):  1. Right IJ central venous catheter.  2. Nonspecific vertical, linear density in the peripheral right upper lobe, likely confluence of shadows/artifact or on the patient.  3. Otherwise, no acute cardiopulmonary disease findings.

## 2020-06-27 NOTE — PROCEDURE NOTE - NSTRACHINTUB_RESP_A_CORE
glidescope copious brownish oral/gastric secretions noted in posterior pharynx upon visualization/glidescope

## 2020-06-27 NOTE — PROGRESS NOTE ADULT - PROBLEM SELECTOR PLAN 1
MRSA bacteremia - receiving IV antibiotics; GRAHAM scheduled. Pt admitted from home, sick x2wks with no GI complaints PTA. Pt covid+; on NRB. No significant PMH; A1C 5.6. Called pt on cellphone ([phone]); difficult to hear; pt passed phone to RN who reported pt is eating well and no GI distress. Per flowsheet on 4/20 pt ate 75% of breakfast. No edema noted.

## 2020-06-27 NOTE — CONSULT NOTE ADULT - SUBJECTIVE AND OBJECTIVE BOX
Patient is a 39y old  Female who presents with a chief complaint of Cellulitis with sepsis, symptomatic anemia. (2020 03:19)      BRIEF HOSPITAL COURSE:   39F with PMHx IVDU use (heroin), smoker, Raynauds, RA, lupus who presented to     Pt is a 38 yo female with a pmh/o lupus, RA, reynards, epilepsy (last seizure 8 yrs ago), IVDA (clean for 4 years), who presented to ED today due to weakness. Pt states that her grandmother  approx. 3 months ago and for the past almost 2 months she has been snorting two bags of heroin a day. Pt states her heroin use is partially due to depression and anxiety since gma passed away however also due to financial reasons as can no longer afford oxycodone which she had been taking for her lupus/RA chronic pain as Rx. Pt states she has wounds all over her body from falls, ulcers from previous attempted injection sites, which are not healing and bleed (approx 2 tbs (RUE wound)) daily. Pt describes weakness as fatigue, endorses sob with exertion, weight loss due to decreased appetite from depression and drug use, palpitations. Denies n/v/d, constipation, HA, hematemesis, hematochezia, dark stools, abd pain, cp, fevers, cough, leg swelling.     Events last 24 hours: Called by bedside nursing staff due to pts acute change in mental status (unresponsive), hypoxia requiring escalating O2. Upon arrival pt noted to have agonal breathing, unresponsive. BS 50 given 1amp D50 with mild improvement. Pt was subsequently intubated due to hypoxia and ongoing AMS.       PAST MEDICAL & SURGICAL HISTORY:  Smoker  Heroin abuse  H/O Raynaud's syndrome  Rheumatoid arthritis  Lupus  Gall bladder stones  H/O appendicitis  H/O tubal ligation      Review of Systems:  unable to evaluate at this time due to cognitive impairment      Medications:  piperacillin/tazobactam IVPB. 3.375 Gram(s) IV Intermittent once  piperacillin/tazobactam IVPB.. 3.375 Gram(s) IV Intermittent every 8 hours  vancomycin  IVPB 750 milliGRAM(s) IV Intermittent daily        acetaminophen   Tablet .. 650 milliGRAM(s) Oral every 4 hours PRN  propofol Infusion 10 MICROgram(s)/kG/Min IV Continuous <Continuous>        pantoprazole  Injectable 40 milliGRAM(s) IV Push daily        folic acid 1 milliGRAM(s) Oral daily  thiamine 100 milliGRAM(s) Oral daily      chlorhexidine 0.12% Liquid 15 milliLiter(s) Oral Mucosa every 12 hours  collagenase Ointment 1 Application(s) Topical daily  silver sulfADIAZINE 1% Cream 1 Application(s) Topical daily        Mode: AC/ CMV (Assist Control/ Continuous Mandatory Ventilation)  RR (machine): 20  TV (machine): 350  FiO2: 100  PEEP: 5  ITime: 1  MAP: 11  PIP: 29      ICU Vital Signs Last 24 Hrs  T(C): 36.1 (2020 00:00), Max: 36.6 (2020 18:40)  T(F): 97 (2020 00:00), Max: 97.8 (2020 18:40)  HR: 124 (2020 04:50) (72 - 129)  BP: 154/105 (2020 04:00) (105/81 - 154/105)  BP(mean): 118 (2020 04:00) (85 - 118)  ABP: --  ABP(mean): --  RR: 27 (2020 04:00) (15 - 44)  SpO2: 97% (2020 04:50) (92% - 100%)                LABS:                        8.3    14.07 )-----------( 74       ( 2020 05:54 )             24.9     06-26    134<L>  |  105  |  54<H>  ----------------------------<  48<L>  4.3   |  18<L>  |  2.36<H>    Ca    7.7<L>      2020 05:54  Mg     1.7     06-25        CARDIAC MARKERS ( 2020 03:35 )  0.127 ng/mL / x     / x     / x     / x          CAPILLARY BLOOD GLUCOSE      POCT Blood Glucose.: 152 mg/dL (2020 04:54)      Urinalysis Basic - ( 2020 21:20 )    Color: Ale / Appearance: Slightly Turbid / S.015 / pH: x  Gluc: x / Ketone: Negative  / Bili: Negative / Urobili: Negative mg/dL   Blood: x / Protein: 30 mg/dL / Nitrite: Negative   Leuk Esterase: Small / RBC: >50 /HPF / WBC 6-10   Sq Epi: x / Non Sq Epi: Many / Bacteria: Many      CULTURES:  Culture Results:   >100,000 CFU/ml Methicillin resistant Staphylococcus aureus  <10,000 CFU/ml Normal Urogenital elsa present ( @ 05:41)  Culture Results:   Growth in aerobic and anaerobic bottles: Methicillin resistant  Staphylococcus aureus  See previous culture [id number] ( @ 22:22)  Culture Results:   Growth in aerobic and anaerobic bottles: Methicillin resistant  Staphylococcus aureus  "Due to technical problems, Proteus sp. will Not be reported as part of  the BCID panel until further notice"  ***Blood Panel PCR results on this specimen are available  approximately 3 hours after the Gram stain result.***  Gram stain, PCR, and/or culture results may not always  correspond due to difference in methodologies.  ************************************************************  This PCR assay was performed using [employer].  The following targets are tested for: Enterococcus,  vancomycin resistant enterococci, Listeria monocytogenes,  coagulase negative staphylococci, S. aureus,  methicillin resistant S. aureus, Streptococcus agalactiae  (Group B), S. pneumoniae, S. pyogenes (Group A),  Acinetobacter baumannii, Enterobacter cloacae, E. coli,  Klebsiella oxytoca, K. pneumoniae, Proteus sp.,  Serratia marcescens, Haemophilus influenzae,  Neisseria meningitidis, Pseudomonas aeruginosa, Candida  albicans, C. glabrata, C krusei, C parapsilosis,  C. tropicalis and the KPC resistance gene. ( @ 22:22)      Physical Examination:    General: No acute distress.  Alert, oriented, interactive, nonfocal    HEENT: Pupils equal, reactive to light.  Symmetric.    PULM: Course BS bilaterally, no active wheezing.     CVS: Regular rate and rhythm, no murmurs    ABD: Soft, nondistended, normoactive bowel sounds    EXT: + LE edema, nontender    SKIN: Warm, + cellulitic lesion throughout    RADIOLOGY: ***    CRITICAL CARE TIME SPENT: *** Patient is a 39y old  Female who presents with a chief complaint of Cellulitis with sepsis, symptomatic anemia. (2020 03:19)      BRIEF HOSPITAL COURSE:   39F with PMHx IVDU use (heroin), smoker, Raynauds, RA with chronic pain med use, lupus, epilepsy who presented to ED with fatigue, CARRION, weight loss, decreased appetite. Found to have multiple areas of cellulitis, anemia, NATALIA, and sepsis. Pt was admitted to medical team. Further workup revealed MRSA bacteremia and MRSA UTI.     Events last 24 hours: Called by bedside nursing staff due to pts acute change in mental status (unresponsive), hypoxia requiring escalating O2. Upon arrival pt noted to have agonal breathing, unresponsive. BS 50 given 1amp D50 with mild improvement. Pt was subsequently intubated due to hypoxia and ongoing AMS.       PAST MEDICAL & SURGICAL HISTORY:  Smoker  Heroin abuse  H/O Raynaud's syndrome  Rheumatoid arthritis  Lupus  Gall bladder stones  H/O appendicitis  H/O tubal ligation      Review of Systems:  unable to evaluate at this time due to cognitive impairment      Medications:  piperacillin/tazobactam IVPB. 3.375 Gram(s) IV Intermittent once  piperacillin/tazobactam IVPB.. 3.375 Gram(s) IV Intermittent every 8 hours  vancomycin  IVPB 750 milliGRAM(s) IV Intermittent daily        acetaminophen   Tablet .. 650 milliGRAM(s) Oral every 4 hours PRN  propofol Infusion 10 MICROgram(s)/kG/Min IV Continuous <Continuous>        pantoprazole  Injectable 40 milliGRAM(s) IV Push daily        folic acid 1 milliGRAM(s) Oral daily  thiamine 100 milliGRAM(s) Oral daily      chlorhexidine 0.12% Liquid 15 milliLiter(s) Oral Mucosa every 12 hours  collagenase Ointment 1 Application(s) Topical daily  silver sulfADIAZINE 1% Cream 1 Application(s) Topical daily        Mode: AC/ CMV (Assist Control/ Continuous Mandatory Ventilation)  RR (machine): 20  TV (machine): 350  FiO2: 100  PEEP: 5  ITime: 1  MAP: 11  PIP: 29      ICU Vital Signs Last 24 Hrs  T(C): 36.1 (2020 00:00), Max: 36.6 (2020 18:40)  T(F): 97 (2020 00:00), Max: 97.8 (2020 18:40)  HR: 124 (2020 04:50) (72 - 129)  BP: 154/105 (2020 04:00) (105/81 - 154/105)  BP(mean): 118 (2020 04:00) (85 - 118)  ABP: --  ABP(mean): --  RR: 27 (2020 04:00) (15 - 44)  SpO2: 97% (2020 04:50) (92% - 100%)                LABS:                        8.3    14.07 )-----------( 74       ( 2020 05:54 )             24.9     06-26    134<L>  |  105  |  54<H>  ----------------------------<  48<L>  4.3   |  18<L>  |  2.36<H>    Ca    7.7<L>      2020 05:54  Mg     1.7     06-25        CARDIAC MARKERS ( 2020 03:35 )  0.127 ng/mL / x     / x     / x     / x          CAPILLARY BLOOD GLUCOSE      POCT Blood Glucose.: 152 mg/dL (2020 04:54)      Urinalysis Basic - ( 2020 21:20 )    Color: Ale / Appearance: Slightly Turbid / S.015 / pH: x  Gluc: x / Ketone: Negative  / Bili: Negative / Urobili: Negative mg/dL   Blood: x / Protein: 30 mg/dL / Nitrite: Negative   Leuk Esterase: Small / RBC: >50 /HPF / WBC 6-10   Sq Epi: x / Non Sq Epi: Many / Bacteria: Many      CULTURES:  Culture Results:   >100,000 CFU/ml Methicillin resistant Staphylococcus aureus  <10,000 CFU/ml Normal Urogenital elsa present ( @ 05:41)  Culture Results:   Growth in aerobic and anaerobic bottles: Methicillin resistant  Staphylococcus aureus  See previous culture [id number] ( @ 22:22)  Culture Results:   Growth in aerobic and anaerobic bottles: Methicillin resistant  Staphylococcus aureus  "Due to technical problems, Proteus sp. will Not be reported as part of  the BCID panel until further notice"  ***Blood Panel PCR results on this specimen are available  approximately 3 hours after the Gram stain result.***  Gram stain, PCR, and/or culture results may not always  correspond due to difference in methodologies.  ************************************************************  This PCR assay was performed using [employer].  The following targets are tested for: Enterococcus,  vancomycin resistant enterococci, Listeria monocytogenes,  coagulase negative staphylococci, S. aureus,  methicillin resistant S. aureus, Streptococcus agalactiae  (Group B), S. pneumoniae, S. pyogenes (Group A),  Acinetobacter baumannii, Enterobacter cloacae, E. coli,  Klebsiella oxytoca, K. pneumoniae, Proteus sp.,  Serratia marcescens, Haemophilus influenzae,  Neisseria meningitidis, Pseudomonas aeruginosa, Candida  albicans, C. glabrata, C krusei, C parapsilosis,  C. tropicalis and the KPC resistance gene. ( @ 22:22)      Physical Examination:    General: No acute distress.  Alert, oriented, interactive, nonfocal    HEENT: Pupils equal, reactive to light.  Symmetric.    PULM: Course BS bilaterally, no active wheezing.     CVS: Regular rate and rhythm, no murmurs apreciated    ABD: Soft, nondistended, normoactive bowel sounds    EXT: + LE edema, nontender    SKIN: Warm, + cellulitic lesion throughout    RADIOLOGY: ***    CRITICAL CARE TIME SPENT: *** Patient is a 39y old  Female who presents with a chief complaint of Cellulitis with sepsis, symptomatic anemia. (2020 03:19)      BRIEF HOSPITAL COURSE:   39F with PMHx IVDU use (heroin), smoker, Raynauds, RA with chronic pain med use, lupus, epilepsy who presented to ED with fatigue, CARRION, weight loss, decreased appetite. Found to have multiple areas of cellulitis, anemia, NATALIA, and sepsis. Pt was admitted to medical team. Further workup revealed MRSA bacteremia and MRSA UTI.     Events last 24 hours: Called by bedside nursing staff due to pts acute change in mental status (unresponsive), hypoxia requiring escalating O2. Upon arrival pt noted to have agonal breathing, unresponsive. BS 50 given 1amp D50 with mild improvement. Pt was subsequently intubated due to hypoxia and ongoing AMS.       PAST MEDICAL & SURGICAL HISTORY:  Smoker  Heroin abuse  H/O Raynaud's syndrome  Rheumatoid arthritis  Lupus  Gall bladder stones  H/O appendicitis  H/O tubal ligation      Review of Systems:  unable to evaluate at this time due to cognitive impairment      Medications:  piperacillin/tazobactam IVPB. 3.375 Gram(s) IV Intermittent once  piperacillin/tazobactam IVPB.. 3.375 Gram(s) IV Intermittent every 8 hours  vancomycin  IVPB 750 milliGRAM(s) IV Intermittent daily        acetaminophen   Tablet .. 650 milliGRAM(s) Oral every 4 hours PRN  propofol Infusion 10 MICROgram(s)/kG/Min IV Continuous <Continuous>        pantoprazole  Injectable 40 milliGRAM(s) IV Push daily        folic acid 1 milliGRAM(s) Oral daily  thiamine 100 milliGRAM(s) Oral daily      chlorhexidine 0.12% Liquid 15 milliLiter(s) Oral Mucosa every 12 hours  collagenase Ointment 1 Application(s) Topical daily  silver sulfADIAZINE 1% Cream 1 Application(s) Topical daily        Mode: AC/ CMV (Assist Control/ Continuous Mandatory Ventilation)  RR (machine): 20  TV (machine): 350  FiO2: 100  PEEP: 5  ITime: 1  MAP: 11  PIP: 29      ICU Vital Signs Last 24 Hrs  T(C): 36.1 (2020 00:00), Max: 36.6 (2020 18:40)  T(F): 97 (2020 00:00), Max: 97.8 (2020 18:40)  HR: 124 (2020 04:50) (72 - 129)  BP: 154/105 (2020 04:00) (105/81 - 154/105)  BP(mean): 118 (2020 04:00) (85 - 118)  ABP: --  ABP(mean): --  RR: 27 (2020 04:00) (15 - 44)  SpO2: 97% (2020 04:50) (92% - 100%)                LABS:                        8.3    14.07 )-----------( 74       ( 2020 05:54 )             24.9     06-26    134<L>  |  105  |  54<H>  ----------------------------<  48<L>  4.3   |  18<L>  |  2.36<H>    Ca    7.7<L>      2020 05:54  Mg     1.7     06-25        CARDIAC MARKERS ( 2020 03:35 )  0.127 ng/mL / x     / x     / x     / x          CAPILLARY BLOOD GLUCOSE      POCT Blood Glucose.: 152 mg/dL (2020 04:54)      Urinalysis Basic - ( 2020 21:20 )    Color: Ale / Appearance: Slightly Turbid / S.015 / pH: x  Gluc: x / Ketone: Negative  / Bili: Negative / Urobili: Negative mg/dL   Blood: x / Protein: 30 mg/dL / Nitrite: Negative   Leuk Esterase: Small / RBC: >50 /HPF / WBC 6-10   Sq Epi: x / Non Sq Epi: Many / Bacteria: Many      CULTURES:  Culture Results:   >100,000 CFU/ml Methicillin resistant Staphylococcus aureus  <10,000 CFU/ml Normal Urogenital elsa present ( @ 05:41)  Culture Results:   Growth in aerobic and anaerobic bottles: Methicillin resistant  Staphylococcus aureus  See previous culture [id number] ( @ 22:22)  Culture Results:   Growth in aerobic and anaerobic bottles: Methicillin resistant  Staphylococcus aureus  "Due to technical problems, Proteus sp. will Not be reported as part of  the BCID panel until further notice"  ***Blood Panel PCR results on this specimen are available  approximately 3 hours after the Gram stain result.***  Gram stain, PCR, and/or culture results may not always  correspond due to difference in methodologies.  ************************************************************  This PCR assay was performed using [employer].  The following targets are tested for: Enterococcus,  vancomycin resistant enterococci, Listeria monocytogenes,  coagulase negative staphylococci, S. aureus,  methicillin resistant S. aureus, Streptococcus agalactiae  (Group B), S. pneumoniae, S. pyogenes (Group A),  Acinetobacter baumannii, Enterobacter cloacae, E. coli,  Klebsiella oxytoca, K. pneumoniae, Proteus sp.,  Serratia marcescens, Haemophilus influenzae,  Neisseria meningitidis, Pseudomonas aeruginosa, Candida  albicans, C. glabrata, C krusei, C parapsilosis,  C. tropicalis and the KPC resistance gene. ( @ 22:22)      Physical Examination:    General: No acute distress.  Alert, oriented, interactive, nonfocal    HEENT: Pupils equal, reactive to light.  Symmetric.    PULM: Course BS bilaterally, no active wheezing.     CVS: Regular rate and rhythm, no murmurs apreciated    ABD: Soft, nondistended, normoactive bowel sounds    EXT: + LE edema, nontender    SKIN: Warm, + cellulitic lesion throughout    RADIOLOGY:     CRITICAL CARE TIME SPENT: 55 mins assessing presenting problems of acute illness that poses high probability of life threatening deterioration or end organ damage/dysfunction.  Medical decision making including Initiating plan of care, reviewing data, reviewing radiology, direct patient bedside evaluation and interpretation of vital signs, any necessary ventilator management , discussion with multidisciplinary team,  all non inclusive of procedures Patient is a 39y old  Female who presents with a chief complaint of Cellulitis with sepsis, symptomatic anemia. (2020 03:19)      BRIEF HOSPITAL COURSE:   39F with PMHx IVDU use (heroin), smoker, Raynauds, RA with chronic pain med use, lupus, epilepsy who presented to ED with fatigue, CARRION, weight loss, decreased appetite. Found to have multiple areas of cellulitis, anemia, NATALIA, and sepsis. Pt was admitted to medical team. Further workup revealed MRSA bacteremia and MRSA UTI.     Events last 24 hours: Called by bedside nursing staff due to pts acute change in mental status (unresponsive), hypoxia requiring escalating O2. Upon arrival pt noted to have agonal breathing, unresponsive. BS 50 given 1amp D50 with mild improvement. Pt was subsequently intubated due to hypoxia and ongoing AMS.       PAST MEDICAL & SURGICAL HISTORY:  Smoker  Heroin abuse  H/O Raynaud's syndrome  Rheumatoid arthritis  Lupus  Gall bladder stones  H/O appendicitis  H/O tubal ligation      Review of Systems:  unable to evaluate at this time due to cognitive impairment      Medications:  piperacillin/tazobactam IVPB. 3.375 Gram(s) IV Intermittent once  piperacillin/tazobactam IVPB.. 3.375 Gram(s) IV Intermittent every 8 hours  vancomycin  IVPB 750 milliGRAM(s) IV Intermittent daily        acetaminophen   Tablet .. 650 milliGRAM(s) Oral every 4 hours PRN  propofol Infusion 10 MICROgram(s)/kG/Min IV Continuous <Continuous>        pantoprazole  Injectable 40 milliGRAM(s) IV Push daily        folic acid 1 milliGRAM(s) Oral daily  thiamine 100 milliGRAM(s) Oral daily      chlorhexidine 0.12% Liquid 15 milliLiter(s) Oral Mucosa every 12 hours  collagenase Ointment 1 Application(s) Topical daily  silver sulfADIAZINE 1% Cream 1 Application(s) Topical daily        Mode: AC/ CMV (Assist Control/ Continuous Mandatory Ventilation)  RR (machine): 20  TV (machine): 350  FiO2: 100  PEEP: 5  ITime: 1  MAP: 11  PIP: 29      ICU Vital Signs Last 24 Hrs  T(C): 36.1 (2020 00:00), Max: 36.6 (2020 18:40)  T(F): 97 (2020 00:00), Max: 97.8 (2020 18:40)  HR: 124 (2020 04:50) (72 - 129)  BP: 154/105 (2020 04:00) (105/81 - 154/105)  BP(mean): 118 (2020 04:00) (85 - 118)  ABP: --  ABP(mean): --  RR: 27 (2020 04:00) (15 - 44)  SpO2: 97% (2020 04:50) (92% - 100%)                LABS:                        8.3    14.07 )-----------( 74       ( 2020 05:54 )             24.9     06-26    134<L>  |  105  |  54<H>  ----------------------------<  48<L>  4.3   |  18<L>  |  2.36<H>    Ca    7.7<L>      2020 05:54  Mg     1.7     06-25        CARDIAC MARKERS ( 2020 03:35 )  0.127 ng/mL / x     / x     / x     / x          CAPILLARY BLOOD GLUCOSE      POCT Blood Glucose.: 152 mg/dL (2020 04:54)      Urinalysis Basic - ( 2020 21:20 )    Color: Ale / Appearance: Slightly Turbid / S.015 / pH: x  Gluc: x / Ketone: Negative  / Bili: Negative / Urobili: Negative mg/dL   Blood: x / Protein: 30 mg/dL / Nitrite: Negative   Leuk Esterase: Small / RBC: >50 /HPF / WBC 6-10   Sq Epi: x / Non Sq Epi: Many / Bacteria: Many      CULTURES:  Culture Results:   >100,000 CFU/ml Methicillin resistant Staphylococcus aureus  <10,000 CFU/ml Normal Urogenital elsa present ( @ 05:41)  Culture Results:   Growth in aerobic and anaerobic bottles: Methicillin resistant  Staphylococcus aureus  See previous culture [id number] ( @ 22:22)  Culture Results:   Growth in aerobic and anaerobic bottles: Methicillin resistant  Staphylococcus aureus  "Due to technical problems, Proteus sp. will Not be reported as part of  the BCID panel until further notice"  ***Blood Panel PCR results on this specimen are available  approximately 3 hours after the Gram stain result.***  Gram stain, PCR, and/or culture results may not always  correspond due to difference in methodologies.  ************************************************************  This PCR assay was performed using [employer].  The following targets are tested for: Enterococcus,  vancomycin resistant enterococci, Listeria monocytogenes,  coagulase negative staphylococci, S. aureus,  methicillin resistant S. aureus, Streptococcus agalactiae  (Group B), S. pneumoniae, S. pyogenes (Group A),  Acinetobacter baumannii, Enterobacter cloacae, E. coli,  Klebsiella oxytoca, K. pneumoniae, Proteus sp.,  Serratia marcescens, Haemophilus influenzae,  Neisseria meningitidis, Pseudomonas aeruginosa, Candida  albicans, C. glabrata, C krusei, C parapsilosis,  C. tropicalis and the KPC resistance gene. ( @ 22:22)      Physical Examination:    General: No acute distress.  Alert, oriented, interactive, nonfocal    HEENT: Pupils equal, reactive to light.  Symmetric.    PULM: Course BS bilaterally, no active wheezing.     CVS: Regular rate and rhythm, no murmurs apreciated    ABD: Soft, nondistended, normoactive bowel sounds    EXT: + LE edema, nontender    SKIN: Warm, + cellulitic lesion throughout    RADIOLOGY:   CXR  3am shows no ptx, diffuse bilateral interstitial infiltrates, no effusion    EXAM:  US ABDOMEN COMPLETE                            PROCEDURE DATE:  2020          INTERPRETATION:  Ultrasound of the abdomen       CLINICAL INFORMATION: Acute renal injury. History of drug abuse. Sepsis. Assess for hydronephrosis. Assess for liver cirrhosis.    TECHNIQUE:   Transcutaneous ultrasonography of the abdomen was performed.    FINDINGS:    No previous examinations are available for review.  There is a hypoechoic complex avascular soft tissue tissue 3.4 x 3.0 x 4.4 cm mass surrounding the portal vein, RIGHT hepatic artery and biliary ducts of indeterminate etiology. Abdominal contrast CT scan recommended to differentiate origin of this mass, either from liver, pancreas or bowel. No recent prior CT scan available for comparison.    The pancreas is grossly intact.    There is diffuse fatty infiltration of liver parenchyma measuring 18 cm in length. No focal mass. Hepatic and portal veins appear patent and are not displaced.  No intrahepatic or ductal dilatation..  The common duct  measures 0.84 cm.       Status post cholecystectomy. .      There is mild splenomegaly, spleen measuring 13.2 cm in length. The right kidney measures 11.8 cm in length.  It demonstrates no mass, calculus or hydronephrosis. Trace fluid in Lyman's pouch.    The left kidney not visualized due to extensive amount of bowel gas and inability to turn patient.    Visualized segments of abdominal aorta are within normal limits by sonographic criteria. IVC is unremarkable.  .  IMPRESSION:   Avascular and mildly echogenic soft tissue mass surrounding portal vein, biliary ducts, and RIGHT hepatic artery of indeterminate etiology. FOLLOW-UP CONTRAST CT SCAN RECOMMENDED.,  Status post cholecystectomy.  Mild splenomegaly.  Fatty infiltration of liver.                SUMANTH NOLEN M.D., ATTENDING RADIOLOGIST  This document has been electronically signed. 2020  6:21PM    EXAM:  ECHO TTE WO CON COMP W DOPP         PROCEDURE DATE:  2020        INTERPRETATION:  Transthoracic Echocardiography Report (TTE)     Demographics     Patient name        MONISHA ARCINIEGA       Age           39 year(s)     Med Rec #           [id number]         Gender        Female     Account #           [id number]      Date of Birth 1980     Interpreting        Kevin Allison MD  Room Number   0037   Physician     Referring Physician RICHARD ROB Sonographer   Bola Ferrari RDCS     Date of study       2020 01:00                       PM     Height              65 in             Weight        119.93 pounds    Type of Study:     TTE procedure: ECHO TTE WO CON COMP W DOP     BP: 102/63 mmHg     Study Location: [facility] Quality: Fair    Indications   1) I38 - Endocarditis    M-Mode Measurements (cm)     LVEDd: 4.7 cm             LVESd: 3.03 cm   IVSEd: 0.92 cm   LVPWd: 0.93 cm            AO Root Dimension: 2.6 cm                             ACS: 1.9 cm                             LA: 3.2 cm    Doppler Measurements:     AV Velocity:145 cm/s               MV Peak E-Wave: 97.2 cm/s   AV Peak Gradient: 8.41 mmHg        MV Peak A-Wave: 80.9 cm/s                                      MV E/A Ratio: 1.2 %   TR Velocity:258 cm/s               MV Peak Gradient: 3.78 mmHg   TR Gradient:26.6256 mmHg   Estimated RAP:5 mmHg   RVSP:32 mmHg     Findings     Mitral Valve   The mitral valve leaflets appear thickened.   Trace mitral regurgitation is present.     Aortic Valve   The aortic valve is trileaflet with thin pliable leaflets.     Tricuspid Valve   The tricuspid valve leaflets appear mildly thickened and/or calcified, but   open well.   Trace tricuspid valve regurgitation is present.     Pulmonic Valve   Trace pulmonic valvular regurgitation is present.     Left Atrium   Normal appearing left atrium.     Left Ventricle   The left ventricle is normal in size, wall thickness, wall motion and   contractility.   Estimated left ventricular ejection fraction is 60 %.     Right Atrium   The right atrium is normal in size.   A catheter is noted in the right atrium.     Right Ventricle   Normal appearing right ventricle structure and function.     Pericardial Effusion   No evidence of pericardial effusion.     Pleural Effusion   No evidence of pleural effusion.     Miscellaneous   The IVC appears normal.     Impression     Summary     The left ventricle is normal in size, wall thickness, wall motion and   contractility.   Estimated left ventricular ejection fraction is 60 %.   The right atrium is normal in size.   A catheter is noted in the right atrium.   The aortic valve is trileaflet with thin pliable leaflets.   The mitral valve leaflets appear thickened.   Trace mitral regurgitation is present.   The tricuspid valve leaflets appear mildly thickened and/or calcified, but   open well.   Trace tricuspid valve regurgitation is present.   Trace pulmonic valvular regurgitation is present.     Signature     ----------------------------------------------------------------   Electronically signed by Kevin Allison MD(Interpreting   physician) on 2020 08:20 PM      CRITICAL CARE TIME SPENT: 55 mins assessing presenting problems of acute illness that poses high probability of life threatening deterioration or end organ damage/dysfunction.  Medical decision making including Initiating plan of care, reviewing data, reviewing radiology, direct patient bedside evaluation and interpretation of vital signs, any necessary ventilator management , discussion with multidisciplinary team,  all non inclusive of procedures

## 2020-06-27 NOTE — PROGRESS NOTE ADULT - SUBJECTIVE AND OBJECTIVE BOX
Dr Pena spoke to "aunt" Maame - she contacted pts uncle/aunt and does not want to be further involved in decision making for the patient.  Dr Pena and I both spoke with:  Douglas Mckeon (uncle) [phone]  Gregoria Porter (aunt) at [phone]    They have stated that there are NO OTHER family members and NO know HCP.  They are thereby the patient's surrogates in this situation.    They both independently agreed that pt QOL is very poor and that 'heroic measures' would NOT be appropriate for her given her underlying poor health, chronic and progressive health issues, and continued drug use.    We discussed current issues and potential plans - specifically the plan was for urgent hemodialysis and the reasons for this were clearly explained.    Additionally, the discussion included, but was NOT limited to:  the use of intubation and mechanical ventilation  the use of vasopressors  the application of CPR  the potential need for a tracheostomy and percutaneous endoscopic gastrostomy  placement in a nursing home or rehab facility  the risk for continued deterioration, morbidity and mortality despite efforts outlined  patient's known wishes  quality of life vs duration    at the conclusion of our discussion,  the surrogates have decided that given her dismal quality of life and ongoing health and drug issues that she would NOT benefit from aggressive care and state that she should be made comfortable and "let go".    I reiterated the above several times to make sure that the severity of the situation was understood and that they were indeed referring to allowing the patient to pass peacefully without further aggressive interventions and did NOT want us to initiate hemodialysis.    They confirmed multiple times that they do NOT want HD initiated and that should she deteriorate they want only comfort measures to be applied.    A) Multiple organ failure, high risk for deterioration, morbidity and mortality    P) Surrogates have decided on patient's behalf NOT to proceed with any further aggressive measures and do NOT want HD to be initiated.  will continue with medical management as possible, and will attempt to allow pt to emerge from sedation and see if she has any decision-making capacity, but this is unlikely given that she will be able to cooperate given the presence of encephalopathy prior to intubation.  HCO3 infusion  PRBC transfusion  strict I/O  +/- Lasix  continue ABx  vent support    DNR.      CRITICAL CARE TIME SPENT: 30 minutes of critical care time spent providing medical care for patient's acute illness/conditions that impairs at least one vital organ system and/or poses a high risk of imminent or life threatening deterioration in the patient's condition. It includes time spent evaluating and treating the patient's acute illness as well as time spent reviewing labs, radiology, discussing goals of care with patient and/or patient's family, and discussing the case with a multidisciplinary team in an effort to prevent further life threatening deterioration or end organ damage. This time is independent of any procedures performed.

## 2020-06-28 LAB
ALBUMIN SERPL ELPH-MCNC: 0.9 G/DL — LOW (ref 3.3–5)
ALP SERPL-CCNC: 275 U/L — HIGH (ref 40–120)
ALT FLD-CCNC: 21 U/L — SIGNIFICANT CHANGE UP (ref 12–78)
ANION GAP SERPL CALC-SCNC: 8 MMOL/L — SIGNIFICANT CHANGE UP (ref 5–17)
AST SERPL-CCNC: 66 U/L — HIGH (ref 15–37)
BILIRUB SERPL-MCNC: 0.7 MG/DL — SIGNIFICANT CHANGE UP (ref 0.2–1.2)
BUN SERPL-MCNC: 56 MG/DL — HIGH (ref 7–23)
CALCIUM SERPL-MCNC: 6.7 MG/DL — LOW (ref 8.5–10.1)
CHLORIDE SERPL-SCNC: 99 MMOL/L — SIGNIFICANT CHANGE UP (ref 96–108)
CO2 SERPL-SCNC: 27 MMOL/L — SIGNIFICANT CHANGE UP (ref 22–31)
CREAT SERPL-MCNC: 2.32 MG/DL — HIGH (ref 0.5–1.3)
GLUCOSE SERPL-MCNC: 107 MG/DL — HIGH (ref 70–99)
HCT VFR BLD CALC: 25.7 % — LOW (ref 34.5–45)
HGB BLD-MCNC: 8.7 G/DL — LOW (ref 11.5–15.5)
LACTATE SERPL-SCNC: 3.2 MMOL/L — HIGH (ref 0.7–2)
MAGNESIUM SERPL-MCNC: 2.1 MG/DL — SIGNIFICANT CHANGE UP (ref 1.6–2.6)
MCHC RBC-ENTMCNC: 30.1 PG — SIGNIFICANT CHANGE UP (ref 27–34)
MCHC RBC-ENTMCNC: 33.9 GM/DL — SIGNIFICANT CHANGE UP (ref 32–36)
MCV RBC AUTO: 88.9 FL — SIGNIFICANT CHANGE UP (ref 80–100)
PHOSPHATE SERPL-MCNC: 3.2 MG/DL — SIGNIFICANT CHANGE UP (ref 2.5–4.5)
PLATELET # BLD AUTO: 30 K/UL — LOW (ref 150–400)
POTASSIUM SERPL-MCNC: 3.8 MMOL/L — SIGNIFICANT CHANGE UP (ref 3.5–5.3)
POTASSIUM SERPL-SCNC: 3.8 MMOL/L — SIGNIFICANT CHANGE UP (ref 3.5–5.3)
PROT SERPL-MCNC: 4.8 GM/DL — LOW (ref 6–8.3)
RBC # BLD: 2.89 M/UL — LOW (ref 3.8–5.2)
RBC # FLD: 17.8 % — HIGH (ref 10.3–14.5)
SODIUM SERPL-SCNC: 134 MMOL/L — LOW (ref 135–145)
WBC # BLD: 11.61 K/UL — HIGH (ref 3.8–10.5)
WBC # FLD AUTO: 11.61 K/UL — HIGH (ref 3.8–10.5)

## 2020-06-28 PROCEDURE — 99291 CRITICAL CARE FIRST HOUR: CPT

## 2020-06-28 PROCEDURE — 99233 SBSQ HOSP IP/OBS HIGH 50: CPT

## 2020-06-28 PROCEDURE — 70450 CT HEAD/BRAIN W/O DYE: CPT | Mod: 26

## 2020-06-28 PROCEDURE — 74176 CT ABD & PELVIS W/O CONTRAST: CPT | Mod: 26

## 2020-06-28 PROCEDURE — 71250 CT THORAX DX C-: CPT | Mod: 26

## 2020-06-28 PROCEDURE — 99223 1ST HOSP IP/OBS HIGH 75: CPT

## 2020-06-28 PROCEDURE — 71045 X-RAY EXAM CHEST 1 VIEW: CPT | Mod: 26

## 2020-06-28 RX ORDER — ALBUMIN HUMAN 25 %
50 VIAL (ML) INTRAVENOUS ONCE
Refills: 0 | Status: COMPLETED | OUTPATIENT
Start: 2020-06-28 | End: 2020-06-28

## 2020-06-28 RX ORDER — FUROSEMIDE 40 MG
40 TABLET ORAL ONCE
Refills: 0 | Status: COMPLETED | OUTPATIENT
Start: 2020-06-28 | End: 2020-06-28

## 2020-06-28 RX ADMIN — CHLORHEXIDINE GLUCONATE 15 MILLILITER(S): 213 SOLUTION TOPICAL at 17:46

## 2020-06-28 RX ADMIN — PIPERACILLIN AND TAZOBACTAM 25 GRAM(S): 4; .5 INJECTION, POWDER, LYOPHILIZED, FOR SOLUTION INTRAVENOUS at 22:06

## 2020-06-28 RX ADMIN — Medication 1 APPLICATION(S): at 11:02

## 2020-06-28 RX ADMIN — PIPERACILLIN AND TAZOBACTAM 25 GRAM(S): 4; .5 INJECTION, POWDER, LYOPHILIZED, FOR SOLUTION INTRAVENOUS at 07:00

## 2020-06-28 RX ADMIN — PIPERACILLIN AND TAZOBACTAM 25 GRAM(S): 4; .5 INJECTION, POWDER, LYOPHILIZED, FOR SOLUTION INTRAVENOUS at 00:30

## 2020-06-28 RX ADMIN — PANTOPRAZOLE SODIUM 40 MILLIGRAM(S): 20 TABLET, DELAYED RELEASE ORAL at 11:03

## 2020-06-28 RX ADMIN — Medication 100 MILLIGRAM(S): at 11:02

## 2020-06-28 RX ADMIN — PROPOFOL 3.26 MICROGRAM(S)/KG/MIN: 10 INJECTION, EMULSION INTRAVENOUS at 18:30

## 2020-06-28 RX ADMIN — PROPOFOL 3.26 MICROGRAM(S)/KG/MIN: 10 INJECTION, EMULSION INTRAVENOUS at 19:18

## 2020-06-28 RX ADMIN — Medication 250 MILLIGRAM(S): at 05:40

## 2020-06-28 RX ADMIN — PIPERACILLIN AND TAZOBACTAM 25 GRAM(S): 4; .5 INJECTION, POWDER, LYOPHILIZED, FOR SOLUTION INTRAVENOUS at 07:02

## 2020-06-28 RX ADMIN — Medication 40 MILLIGRAM(S): at 14:26

## 2020-06-28 RX ADMIN — Medication 1 MILLIGRAM(S): at 11:02

## 2020-06-28 RX ADMIN — PHENYLEPHRINE HYDROCHLORIDE 10.2 MICROGRAM(S)/KG/MIN: 10 INJECTION INTRAVENOUS at 09:30

## 2020-06-28 RX ADMIN — PROPOFOL 3.26 MICROGRAM(S)/KG/MIN: 10 INJECTION, EMULSION INTRAVENOUS at 14:26

## 2020-06-28 RX ADMIN — Medication 650 MILLIGRAM(S): at 17:46

## 2020-06-28 RX ADMIN — CHLORHEXIDINE GLUCONATE 15 MILLILITER(S): 213 SOLUTION TOPICAL at 05:40

## 2020-06-28 RX ADMIN — Medication 50 MILLILITER(S): at 18:29

## 2020-06-28 RX ADMIN — PIPERACILLIN AND TAZOBACTAM 25 GRAM(S): 4; .5 INJECTION, POWDER, LYOPHILIZED, FOR SOLUTION INTRAVENOUS at 14:26

## 2020-06-28 NOTE — CONSULT NOTE ADULT - SUBJECTIVE AND OBJECTIVE BOX
Patient is a 39y old  Female who presents with a chief complaint of Cellulitis with sepsis, symptomatic anemia. (2020 07:50)      HPI:  Pt is a 40 yo female with a pmh/o lupus, RA, reynards, epilepsy (last seizure 8 yrs ago), IVDA (clean for 4 years), who presented to ED due to weakness. Pt states that her grandmother  approx. 3 months ago and for the past almost 2 months she has been snorting two bags of heroin a day. Pt states her heroin use is partially due to depression and anxiety since gma passed away however also due to financial reasons as can no longer afford oxycodone which she had been taking for her lupus/RA chronic pain as Rx. Pt states she has wounds all over her body from falls, ulcers from previous attempted injection sites, which are not healing and bleed (approx 2 tbs (RUE wound)) daily. Pt describes weakness as fatigue, endorses sob with exertion, weight loss due to decreased appetite from depression and drug use, palpitations. Denies n/v/d, constipation, HA, hematemesis, hematochezia, dark stools, abd pain, cp, fevers, cough, leg swelling.   She suffered acute exacerbation and deterioration on medical floor and now is in ICU with sepsis on pressors.  GI consulted for abnormal sono with large liver lesion - ?liver abscess    PAST MEDICAL & SURGICAL HISTORY:  Smoker  Heroin abuse  H/O Raynaud's syndrome  Rheumatoid arthritis  Lupus  Gall bladder stones  H/O appendicitis  H/O tubal ligation    MEDICATIONS  (STANDING):  chlorhexidine 0.12% Liquid 15 milliLiter(s) Oral Mucosa every 12 hours  collagenase Ointment 1 Application(s) Topical daily  dextrose 5%. 1000 milliLiter(s) (50 mL/Hr) IV Continuous <Continuous>  dextrose 50% Injectable 12.5 Gram(s) IV Push once  dextrose 50% Injectable 25 Gram(s) IV Push once  dextrose 50% Injectable 25 Gram(s) IV Push once  folic acid 1 milliGRAM(s) Oral daily  norepinephrine Infusion 0.05 MICROgram(s)/kG/Min (5.1 mL/Hr) IV Continuous <Continuous>  pantoprazole  Injectable 40 milliGRAM(s) IV Push daily  phenylephrine    Infusion 0.5 MICROgram(s)/kG/Min (10.2 mL/Hr) IV Continuous <Continuous>  piperacillin/tazobactam IVPB.. 3.375 Gram(s) IV Intermittent every 8 hours  propofol Infusion 10 MICROgram(s)/kG/Min (3.26 mL/Hr) IV Continuous <Continuous>  silver sulfADIAZINE 1% Cream 1 Application(s) Topical daily  thiamine 100 milliGRAM(s) Oral daily  vancomycin  IVPB 750 milliGRAM(s) IV Intermittent daily    MEDICATIONS  (PRN):  acetaminophen   Tablet .. 650 milliGRAM(s) Oral every 4 hours PRN Temp greater or equal to 38C (100.4F), Mild Pain (1 - 3)  dextrose 40% Gel 15 Gram(s) Oral once PRN Blood Glucose LESS THAN 70 milliGRAM(s)/deciLiter  glucagon  Injectable 1 milliGRAM(s) IntraMuscular once PRN Glucose <70 milliGRAM(s)/deciLiter    Allergies  morphine (Unknown)    SOCIAL HISTORY: as above    FAMILY HISTORY:  No pertinent family history in first degree relatives: cannot recall family history at this time      REVIEW OF SYSTEMS:  not obtained due to pt mental status    Vital Signs Last 24 Hrs  T(C): 36.6 (2020 08:00), Max: 39.1 (2020 21:45)  T(F): 97.9 (2020 08:00), Max: 102.3 (2020 21:45)  HR: 114 (2020 09:01) (110 - 147)  BP: 99/63 (2020 09:00) (85/50 - 121/75)  BP(mean): 71 (2020 09:00) (58 - 88)  RR: --  SpO2: 99% (2020 09:01) (92% - 100%)    PHYSICAL EXAM:  Constitutional: intubated on pressors  HEENT: intubated  Neck: No LAD  Respiratory: occ rhonchi  Cardiovascular: S1 and S2  Gastrointestinal: BS+, soft, NT/ND  Extremities: edema    LABS:                        8.7    11.61 )-----------( 30       ( 2020 06:00 )             25.7     06-28    134<L>  |  99  |  56<H>  ----------------------------<  107<H>  3.8   |  27  |  2.32<H>    Ca    6.7<L>      2020 06:00  Phos  3.2       Mg     2.1         TPro  4.8<L>  /  Alb  0.9<L>  /  TBili  0.7  /  DBili  x   /  AST  66<H>  /  ALT  21  /  AlkPhos  275<H>        LIVER FUNCTIONS - ( 2020 06:00 )  Alb: 0.9 g/dL / Pro: 4.8 gm/dL / ALK PHOS: 275 U/L / ALT: 21 U/L / AST: 66 U/L / GGT: x             RADIOLOGY & ADDITIONAL STUDIES:

## 2020-06-28 NOTE — PROGRESS NOTE ADULT - SUBJECTIVE AND OBJECTIVE BOX
39F admitted 6/24 - with fatigue, CARRION, weight loss, decreased appetite. Found to have multiple areas of cellulitis, anemia, NATALIA, and sepsis. Pt was admitted to hospital medicine team. Further workup revealed MRSA bacteremia and MRSA UTI.     CCM called emergently by RN due to deterioration and acute change in mental status (unresponsive), hypoxia requiring escalating O2 (+) agonal breathing, unresponsive. BS 50 given 1amp D50 with mild improvement. Pt was subsequently intubated due to hypoxia and ongoing AMS.     above d/w CODY Ivey who emergently evaluated and treated the patient.  Metabolic acidosis and gross anuria prompted initiation of NaHCO3 infusion.  CXR with diffuse B/L infiltrates.    6/27: seen/examined 8:00 am and chart reviewed -F/u ECHO ordered STAT.  Dr Pena from nephrology informed of deterioration/acidosis - potential need for HD.  Spoke with pts "aunt" who is close family friend and apparently only person really involved in helping the patient at this point.  SBP in 80's - started on phenylephrine   Lactate 7.5  D/w Dr Venegas at bedside - EKG unrevealing of significant changes but F/U echo with septal/inferior wall motion abnorms / hypokinesis that wasn't present on ECHO earlier in the week.  Notably EF remains relatively preserved.    Case d/w Dr RADHA Brown who has been caring for the patient.    6/28: pt OFF pressors since last evening s/p 2u PRBC with appropriate rise in Hgb.    Lactate now down to 3.2  Serum HCO3 27 and so bicarb infusion discontinued.  Made 400ml urine overnight.  leukocytosis improving - thrombocytopenia noted to have worsened.     ""  - [place] came to visit - wants to be involved in decision making as did Blake Card who claims to be pts brother - other family have stated that pt does NOT have a brother and so his relation/intentions are questionable.  Will involve ethics committee and/or social work team to determine appropriate decision makers given lack of formal proxy documentation or declaration from the patient.  At this time the closest family member that I have access to is pts aunt in [place].    PMHx IVDU use (heroin), smoker, Raynauds, RA with chronic pain med use, lupus, epilepsy   PAST MEDICAL & SURGICAL HISTORY:  Smoker  Heroin abuse  H/O Raynaud's syndrome  Rheumatoid arthritis  Lupus  Gall bladder stones  H/O appendicitis  H/O tubal ligation    Allergies    morphine (Unknown)      ICU Vital Signs Last 24 Hrs  T(C): 36.6 (28 Jun 2020 06:00), Max: 39.1 (27 Jun 2020 21:45)  T(F): 97.8 (28 Jun 2020 06:00), Max: 102.3 (27 Jun 2020 21:45)  HR: 110 (28 Jun 2020 07:00) (110 - 147)  BP: 93/66 (28 Jun 2020 07:00) (85/50 - 121/75)  BP(mean): 71 (28 Jun 2020 07:00) (58 - 88)  SpO2: 95% (28 Jun 2020 07:00) (92% - 100%)      Mode: AC/ CMV (Assist Control/ Continuous Mandatory Ventilation)  RR (machine): 20  TV (machine): 350  FiO2: 70  PEEP: 5  ITime: 1  MAP: 11  PIP: 19      I&O's Summary    27 Jun 2020 07:01  -  28 Jun 2020 07:00  --------------------------------------------------------  IN: 2968.8 mL / OUT: 400 mL / NET: 2568.8 mL                              8.7    11.61 )-----------( 30       ( 28 Jun 2020 06:00 )             25.7       06-28    134<L>  |  99  |  56<H>  ----------------------------<  107<H>  3.8   |  27  |  2.32<H>    Ca    6.7<L>      28 Jun 2020 06:00  Phos  3.2     06-28  Mg     2.1     06-28    TPro  4.8<L>  /  Alb  0.9<L>  /  TBili  0.7  /  DBili  x   /  AST  66<H>  /  ALT  21  /  AlkPhos  275<H>  06-28      CAPILLARY BLOOD GLUCOSE      POCT Blood Glucose.: 111 mg/dL (28 Jun 2020 05:00)  POCT Blood Glucose.: 110 mg/dL (28 Jun 2020 00:35)  POCT Blood Glucose.: 107 mg/dL (27 Jun 2020 20:41)  POCT Blood Glucose.: 166 mg/dL (27 Jun 2020 13:05)  POCT Blood Glucose.: 106 mg/dL (27 Jun 2020 09:07)      LIVER FUNCTIONS - ( 28 Jun 2020 06:00 )  Alb: 0.9 g/dL / Pro: 4.8 gm/dL / ALK PHOS: 275 U/L / ALT: 21 U/L / AST: 66 U/L / GGT: x             CARDIAC MARKERS ( 27 Jun 2020 07:20 )  0.250 ng/mL / x     / x     / x     / x      CARDIAC MARKERS ( 27 Jun 2020 03:35 )  0.127 ng/mL / x     / x     / x     / x          ABG - ( 27 Jun 2020 05:35 )  pH, Arterial: 7.01  pH, Blood: x     /  pCO2: 28    /  pO2: 105   / HCO3: 7     / Base Excess: -22.2 /  SaO2: 93            MEDICATIONS  (STANDING):  chlorhexidine 0.12% Liquid 15 milliLiter(s) Oral Mucosa every 12 hours  collagenase Ointment 1 Application(s) Topical daily  dextrose 5%. 1000 milliLiter(s) (50 mL/Hr) IV Continuous <Continuous>  dextrose 50% Injectable 12.5 Gram(s) IV Push once  dextrose 50% Injectable 25 Gram(s) IV Push once  dextrose 50% Injectable 25 Gram(s) IV Push once  folic acid 1 milliGRAM(s) Oral daily  norepinephrine Infusion 0.05 MICROgram(s)/kG/Min (5.1 mL/Hr) IV Continuous <Continuous>  pantoprazole  Injectable 40 milliGRAM(s) IV Push daily  phenylephrine    Infusion 0.5 MICROgram(s)/kG/Min (10.2 mL/Hr) IV Continuous <Continuous>  piperacillin/tazobactam IVPB.. 3.375 Gram(s) IV Intermittent every 8 hours  propofol Infusion 10 MICROgram(s)/kG/Min (3.26 mL/Hr) IV Continuous <Continuous>  silver sulfADIAZINE 1% Cream 1 Application(s) Topical daily  thiamine 100 milliGRAM(s) Oral daily  vancomycin  IVPB 750 milliGRAM(s) IV Intermittent daily    MEDICATIONS  (PRN):  acetaminophen   Tablet .. 650 milliGRAM(s) Oral every 4 hours PRN Temp greater or equal to 38C (100.4F), Mild Pain (1 - 3)  dextrose 40% Gel 15 Gram(s) Oral once PRN Blood Glucose LESS THAN 70 milliGRAM(s)/deciLiter  glucagon  Injectable 1 milliGRAM(s) IntraMuscular once PRN Glucose <70 milliGRAM(s)/deciLiter        DVT Prophylaxis: has not been on DVT prophylaxis due to anemia and thrombocytopenia, Venodynes     Advanced Directives: DNR and NO hemodialysis  Discussed with: Douglas Mckeon (uncle) [phone] & Gregoria Porter (aunt) at [phone]      Visit Information:  45  minutes of Critical Care time spent providing medical care for patient's acute illness/conditions that impairs at least one vital organ system and/or poses a high risk of imminent or life threatening deterioration in the patient's condition.  It includes time spent reviewing labs, radiology, discussing goals of care with patient and/or family, and discussing the case with a multidisciplinary team in an effort to prevent further life threatening deterioration or end organ damage.  This time is independent of any procedures performed.    ** Time is exclusive of billed procedures and/or teaching and/or routine family updates.
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
## 2020-06-28 NOTE — PROGRESS NOTE ADULT - SUBJECTIVE AND OBJECTIVE BOX
Pt is a 40 yo female with a pm hx of SLE ,RA ( last seen by dr medina 1 year ago?)   ,reynards, epilepsy (last seizure 8 yrs ago), IVDA (clean for 4 years), who presented to ED today due to weakness. Pt states that her grandmother  approx. 3 months ago and for the past almost 2 months she has been snorting two bags of heroin a day. Pt states her heroin use is partially due to depression and anxiety since she passed away however also due to financial reasons as can no longer afford oxycodone which she had been taking for her lupus/RA chronic pain as Rx. Pt states she has wounds all over her body from falls, ulcers from previous attempted injection sites, which are not healing and bleeding    Pt describes weakness as fatigue, endorses sob with exertion, weight loss due to decreased appetite from depression and drug use, palpitations. Denies n/v/d, constipation, HA, hematemesis, hematochezia, dark stools, abd pain, cp, fevers, cough, leg swelling.   In ED pt found to have lactate 3.9 with Hgb of 5.5, , T 100.4. pt was admitted to ICU   renal eval called for elevated Creatinine- no previous Cr available to compare  last seen by a physician 1 year ago . pt denies hx of renal disease in past  pt admits to daily Mobic use previously and now daily Advil use at home    remains intubated   no overnight events         PAST MEDICAL & SURGICAL HISTORY:  Smoker  Heroin abuse  H/O Raynaud's syndrome  Rheumatoid arthritis  Lupus  Gall bladder stones  H/O appendicitis  H/O tubal ligation      MEDICATIONS  (STANDING):  chlorhexidine 0.12% Liquid 15 milliLiter(s) Oral Mucosa every 12 hours  collagenase Ointment 1 Application(s) Topical daily  dextrose 5%. 1000 milliLiter(s) (50 mL/Hr) IV Continuous <Continuous>  dextrose 50% Injectable 12.5 Gram(s) IV Push once  dextrose 50% Injectable 25 Gram(s) IV Push once  dextrose 50% Injectable 25 Gram(s) IV Push once  folic acid 1 milliGRAM(s) Oral daily  norepinephrine Infusion 0.05 MICROgram(s)/kG/Min (5.1 mL/Hr) IV Continuous <Continuous>  pantoprazole  Injectable 40 milliGRAM(s) IV Push daily  phenylephrine    Infusion 0.5 MICROgram(s)/kG/Min (10.2 mL/Hr) IV Continuous <Continuous>  piperacillin/tazobactam IVPB.. 3.375 Gram(s) IV Intermittent every 8 hours  propofol Infusion 10 MICROgram(s)/kG/Min (3.26 mL/Hr) IV Continuous <Continuous>  silver sulfADIAZINE 1% Cream 1 Application(s) Topical daily  thiamine 100 milliGRAM(s) Oral daily  vancomycin  IVPB 750 milliGRAM(s) IV Intermittent daily    MEDICATIONS  (PRN):  acetaminophen   Tablet .. 650 milliGRAM(s) Oral every 4 hours PRN Temp greater or equal to 38C (100.4F), Mild Pain (1 - 3)  dextrose 40% Gel 15 Gram(s) Oral once PRN Blood Glucose LESS THAN 70 milliGRAM(s)/deciLiter  glucagon  Injectable 1 milliGRAM(s) IntraMuscular once PRN Glucose <70 milliGRAM(s)/deciLiter      Allergies    morphine (Unknown)    Intolerances    ROS:    limited by pt mental status     Vital Signs Last 24 Hrs  T(C): 36.6 (2020 08:00), Max: 39.1 (2020 21:45)  T(F): 97.9 (2020 08:00), Max: 102.3 (2020 21:45)  HR: 115 (2020 12:00) (102 - 147)  BP: 105/72 (2020 12:00) (81/51 - 121/75)  BP(mean): 80 (2020 12:00) (57 - 89)  RR: --  SpO2: 98% (2020 12:00) (92% - 100%)      I&O's Detail    2020 07:01  -  2020 07:00  --------------------------------------------------------  IN:    dextrose 5%: 1800 mL    Packed Red Blood Cells: 375 mL    phenylephrine   Infusion: 123.8 mL    propofol Infusion: 570 mL    Solution: 100 mL  Total IN: 2968.8 mL    OUT:    Indwelling Catheter - Urethral: 400 mL  Total OUT: 400 mL    Total NET: 2568.8 mL      2020 07:01  -  2020 13:02  --------------------------------------------------------  IN:    dextrose 5%: 150 mL    Nepro: 15 mL  Total IN: 165 mL    OUT:  Total OUT: 0 mL    Total NET: 165 mL      PHYSICAL EXAM:    Constitutional: now intubated   HEENT: MM + dry   Neck: No LAD, No JVD  Respiratory: poor AE   Cardiovascular: S1 and S2  Gastrointestinal: BS+, soft, NT/ND  Extremities: peripheral edema ++ 3-4 w multiple lessions and ulces on upper and lower ext   Neurological: intubated ,    : No Salgado  Skin: multiple lesions/rashes and weeping areas   Access: Not applicable    LABS:               134    |  99     |  56     ----------------------------<  107       2020 06:00  3.8     |  27     |  2.32     137    |  102    |  57     ----------------------------<  97        2020 22:20  4.5     |  26     |  2.39     136    |  106    |  57     ----------------------------<  106       2020 07:20  4.9     |  17     |  2.54     Ca    6.7        2020 06:00 - corrected ca 9.2   Albumin, Serum: 0.9 g/dL (20 @ 06:00)   Ca    7.2        2020 22:20    Phos  3.2       2020 06:00  Phos  3.3       2020 22:20    Mg     2.1       2020 06:00      Double Stranded DNA Antibody: 428: Method: EIA            Reference Ranges            Interpretation            <= 29    IU/mL     Negative            30 - 75  IU/mL     Borderline            > 75      IU/mL     Positive IU/mL (20 @ 05:54)      C3 Complement, Serum: 54 mg/dL (20 @ 05:54)    C4 Complement, Serum: 10 mg/dL (20 @ 05:54)    HIV-1/2 Combo Result: Nonreact:   Acute Hepatitis Panel (20 @ 07:05)   Hepatitis C Virus S/CO Ratio: 0.29 S/CO    Hepatitis B Core IgM Antibody: Nonreact    Hepatitis B Surface Antigen: Nonreact    Hepatitis A IgM Antibody: Nonreact        Urine Microscopic-Add On (NC) (20 @ 21:20)    Red Blood Cell - Urine: >50 /HPF    White Blood Cell - Urine: 6-10    Bacteria: Many    Comment - Urine: Moderate Trichomonas    Epithelial Cells: Many    Protein/Creatinine Ratio Calculation: 1.9 Ratio (20 @ 21:20)    Sodium, Random Urine: <20:     Urine Studies:  Urinalysis Basic - ( 2020 05:41 )    Color: Ale / Appearance: Slightly Turbid / S.010 / pH: x  Gluc: x / Ketone: Negative  / Bili: Negative / Urobili: 1 mg/dL   Blood: x / Protein: 30 mg/dL / Nitrite: Negative   Leuk Esterase: Moderate / RBC: 11-25 /HPF / WBC 3-5   Sq Epi: x / Non Sq Epi: Negative / Bacteria: Many      RADIOLOGY & ADDITIONAL STUDIES:    INTERPRETATION:  Ultrasound of the abdomen       CLINICAL INFORMATION: Acute renal injury. History of drug abuse. Sepsis. Assess for hydronephrosis. Assess for liver cirrhosis.    TECHNIQUE:   Transcutaneous ultrasonography of the abdomen was performed.    FINDINGS:    No previous examinations are available for review.  There is a hypoechoic complex avascular soft tissue tissue 3.4 x 3.0 x 4.4 cm mass surrounding the portal vein, RIGHT hepatic artery and biliary ducts of indeterminate etiology. Abdominal contrast CT scan recommended to differentiate origin of this mass, either from liver, pancreas or bowel. No recent prior CT scan available for comparison.    The pancreas is grossly intact.    There is diffuse fatty infiltration of liver parenchyma measuring 18 cm in length. No focal mass. Hepatic and portal veins appear patent and are not displaced.  No intrahepatic or ductal dilatation..  The common duct  measures 0.84 cm.       Status post cholecystectomy. .      There is mild splenomegaly, spleen measuring 13.2 cm in length. The right kidney measures 11.8 cm in length.  It demonstrates no mass, calculus or hydronephrosis. Trace fluid in Lyman's pouch.    The left kidney not visualized due to extensive amount of bowel gas and inability to turn patient.    Visualized segments of abdominal aorta are within normal limits by sonographic criteria. IVC is unremarkable.  .  IMPRESSION:   Avascular and mildly echogenic soft tissue mass surrounding portal vein, biliary ducts, and RIGHT hepatic artery of indeterminate etiology. FOLLOW-UP CONTRAST CT SCAN RECOMMENDED.,  Status post cholecystectomy.  Mild splenomegaly.  Fatty infiltration ofliver.

## 2020-06-28 NOTE — CHART NOTE - NSCHARTNOTEFT_GEN_A_CORE
HPI:  40 yo female with a pm hx of SLE ,RA ,Reynaud's syndrome, epilepsy (last seizure 8 yrs ago), IVDA (clean for 4 years), who presented to ED today due to weakness. Pt states that her grandmother  approx. 3 months ago and for the past almost 2 months she has been snorting two bags of heroin a day. Pt states her heroin use is partially due to depression and anxiety since she passed away however also due to financial reasons as can no longer afford oxycodone which she had been taking for her lupus/RA chronic pain as Rx. Pt states she has wounds all over her body from falls, ulcers from previous attempted injection sites, which are not healing and bleeding.    Current status:  Pt intubated on . Nephrology following w/ recommendations of possible need for HD (+acidosis). Advanced directives: DNR, No HD. Lactate level improving and is off pressors.   Enteral feeds started by intensivist w/ Nepro formula. Sedation infusing Propofol 9.8ml/hr providing additional 260 calories.      I/O-   Input: Dextrose 5% IVF-1800ml (D/C'd))  IV Piggyback-100ml  Output:   Indwelling catheter-400ml  Last BM 6/28 x 2 (incontinent)     Significant weight gain noted x 3 days 8.6#/7.3%  Weight gain expected due to Renal decline. Edema ^ +3     Labs reviewed- Potassium WNL, Phosphorus WNL, Albumin 0.9 (L).   Fluid restriction warranted due to poor output/renal status. (1000ml/24hr).    Pt remains with diagnosis of severe protein/calorie malnutrition.   See below for Enteral feed recommendations.      Recommendations:  1) Nepro @ 30cc/hr (total volume 600ml) + 5 Packs of Prosource TF (including calories from propofol- Total nutritional values daily-1522 calories/ 104gm protein/ 440ml free water from enteral formula.  2) monitor strict I/O's  3) daily weights  4) monitor labs/lytes and hydration replete as needed  5) Maintain aspiration precautions, back of bed >35 degrees.   6) monitor sedation and adjust enteral volume as needed to meet ENN.           Diet Presciption: Diet, NPO with Tube Feed:   Tube Feeding Modality: Orogastric  Nepro with Carb Steady (NEPRORTH)  Total Volume for 24 Hours (mL): 960  Continuous  Starting Tube Feed Rate {mL per Hour}: 20  Increase Tube Feed Rate by (mL): 20     Every 4 hours  Until Goal Tube Feed Rate (mL per Hour): 40  Tube Feed Duration (in Hours): 24  Tube Feed Start Time: 10:00  No Carb Prosource TF     Qty per Day:  2 (20 @ 08:00)      Wt Hx: - 125.2# Admission weight - 116.6# indicating a 8.6# weight gain (7.3% x 3 days) Edema ^/ urine output (decreased)   Height (cm): 165.1 (20 @ 20:37)  Weight (kg): 54.4 (20 @ 20:37)  BMI (kg/m2): 20 (20 @ 20:37)        Weight Used for Calculation: 52.8Kg    Estimated Energy Needs ( 25-30 calories/kg): 4803-6714 calories  Estimated Protein Needs (1.8-2.0 gm/kg): 95-106gm protein  Estimated Fluid Needs (<20 ml/kg):1000ml    Labs:  Na134 mmol/L<L> Glu 107 mg/dL<H> K+ 3.8 mmol/L Cr  2.32 mg/dL<H> BUN 56 mg/dL<H>  Phos 3.2 mg/dL  Alb 0.9 g/dL<L>    POCT Blood Glucose.: 111 mg/dL (2020 05:00)  POCT Blood Glucose.: 110 mg/dL (2020 00:35)  POCT Blood Glucose.: 107 mg/dL (2020 20:41)  POCT Blood Glucose.: 166 mg/dL (2020 13:05)  POCT Blood Glucose.: 106 mg/dL (2020 09:07)      Skin: john paul score = 11 (high risk for further skin breakdown)  Edema- +2 R/L leg pitting- currently ^ +3 R/L leg and R/L hand  +1 generalized edema documented.  Skin: sacrum stage 2    ***Will continue to monitor and follow up prn.***

## 2020-06-28 NOTE — PROGRESS NOTE ADULT - ASSESSMENT
39F admitted 6/24 currently suffering from:  severe sepsis and septic shock since time of admission/present on admission  hypovolemic shock - improved s/p 2u PRBC /27  MRSA bacteremia, may be secondary to MRSA UTI but IVDA/endocarditis also potential source  acute hypoxemic respiratory failure - ARDS with diffuse B/L infiltrates - intubated 6/27  AMS - toxic metabolic encephalopathy vs septic encephalopathy  NATALIA with apparent oliguria  thrombocytopenia most likely sepsis related  anemia - chronic  severe metabolic acidosis - lactic acidosis - has been present since admission but now has worsened - appears out of proportion to level of sepsis; potential component from severe anemia as well as ?? decreased clearance due to ivory-hepatic lesion ??  ill defined hepatic mass ? abscess  New septal and inferior wall motion abnorms on f/u ECHO 6/27 with preserved EF    Plan at this time is for continued care in CCU  VAP prevention bundle  Titrate FiO2 as possible +/- PEEP per ARDSnet protocol  Full vent support with LTV 6-8cc/kg IDW, currently peaks <27 with plts<30  Utilization of PEEP for alveolar recruitment  CXR to assess progression this AM  Continue Vancomycin, Zosyn - possible aspiration - concern for PNA with Gram negative organisms  Will d/w ID  PPI, start TF - Nutrition services eval  Strict I/O, Salgado  d/c NaHCO3 infusion given serum HCO3 27 at present  D/w Dr Pena from nephrology  GI eval re: ivory-hepatic mass   CT chest/abd/pelvis to better characterize pathology concern for intraabdominal abscess given abd US findings  unfortunately pt cannot have IV contrast due to risk of STEPHANIE in setting of NATALIA - this will likely diminish CT scans utility in this situation.   FS glucose - monitor for hypoglycemia/hypoglycemia treatment protocol  Social work consultation re drug use; rehab placement; etc.    Ethics consultation given lack of designated HCP and multiple persons competing for role of surrogate   at this time I am receiving direction from Gregoria Porter (aunt) at [phone].  Douglas Linda (uncle) [phone] has deferred any decision making to his sister Gregoria Porter.  They have stated that there are NO OTHER family members and NO know HCP.  They are thereby the patient's surrogates in this situation.  The  is "" from the patient and she made no specific reference to him and there is no known HCP documentation in place.  Refer to detailed note 6/27.  I tried to allow patient to emerge from sedation on 6/27 to potentially get some direction in regard to decision making/surrogates/etc but pt too ill and suffering from toxic metabolic encephalopathy - unable to follow any commands at all let alone make meaningful decisions.    DNR and NO HD as discussed with surrogates as above.

## 2020-06-28 NOTE — PROGRESS NOTE ADULT - SUBJECTIVE AND OBJECTIVE BOX
REASON FOR VISIT:  Possible SBE    HPI:  39 year old woman with a history of Lupus, RA, Raynaud's seizure disorder, anxiety/depression, and drug abuse who presented to the ER on 6/23/2020 with complaints of weakness and fever; diagnosed with MRSA bacteremia --  cardiology was consulted on 6/26 with request for GRAHAM and procedure has been scheduled.    6/27/20:  Overnight events noted and discussed with Dr Comer (acute respiratory failure, lactic acidosis).  6/28/20: Sedated on vent    MEDICATIONS  (STANDING):  chlorhexidine 0.12% Liquid 15 milliLiter(s) Oral Mucosa every 12 hours  collagenase Ointment 1 Application(s) Topical daily  folic acid 1 milliGRAM(s) Oral daily  norepinephrine Infusion 0.05 MICROgram(s)/kG/Min (5.1 mL/Hr) IV Continuous <Continuous>  pantoprazole  Injectable 40 milliGRAM(s) IV Push daily  phenylephrine    Infusion 0.5 MICROgram(s)/kG/Min (10.2 mL/Hr) IV Continuous <Continuous>  piperacillin/tazobactam IVPB.. 3.375 Gram(s) IV Intermittent every 8 hours  propofol Infusion 10 MICROgram(s)/kG/Min (3.26 mL/Hr) IV Continuous <Continuous>  silver sulfADIAZINE 1% Cream 1 Application(s) Topical daily  thiamine 100 milliGRAM(s) Oral daily  vancomycin  IVPB 750 milliGRAM(s) IV Intermittent daily    MEDICATIONS  (PRN):  acetaminophen   Tablet .. 650 milliGRAM(s) Oral every 4 hours PRN Temp greater or equal to 38C (100.4F), Mild Pain (1 - 3)  dextrose 40% Gel 15 Gram(s) Oral once PRN Blood Glucose LESS THAN 70 milliGRAM(s)/deciLiter  glucagon  Injectable 1 milliGRAM(s) IntraMuscular once PRN Glucose <70 milliGRAM(s)/deciLiter    Vital Signs Last 24 Hrs  T(C): 36.6 (28 Jun 2020 08:00), Max: 39.1 (27 Jun 2020 21:45)  T(F): 97.9 (28 Jun 2020 08:00), Max: 102.3 (27 Jun 2020 21:45)  HR: 113 (28 Jun 2020 09:30) (110 - 147)  BP: 83/50 (28 Jun 2020 09:30) (83/50 - 121/75)  BP(mean): 57 (28 Jun 2020 09:30) (57 - 88)  SpO2: 96% (28 Jun 2020 09:30) (92% - 100%)    PHYSICAL EXAM:  Constitutional: Semirecumbent in bed on vent, sedated, appears older than stated age  HEENT: ETT to vent  Pulmonary: Bilateral breath sounds with scattered rhonchi  Cardiovascular: Tachycardic, regular, diffuse edema    LABS:       CARDIAC MARKERS ( 27 Jun 2020 07:20 ) 0.250 ng/mL / x     / x     / x     / x      CARDIAC MARKERS ( 27 Jun 2020 03:35 ) 0.127 ng/mL / x     / x     / x     / x                        8.7    11.61 )-----------( 30       ( 28 Jun 2020 06:00 )             25.7     134<L>  |  99  |  56<H>  ----------------------------<  107<H>  3.8   |  27  |  2.32<H>    Ca    6.7<L>      28 Jun 2020 06:00  Phos  3.2     06-28  Mg     2.1     06-28    TPro  4.8<L>  /  Alb  0.9<L>  /  TBili  0.7  /  DBili  x   /  AST  66<H>  /  ALT  21  /  AlkPhos  275<H>  06-28    12 Lead ECG (06.24.20 @ 10:51):  Normal sinus rhythm, Low voltage QRS    TTE Echo Complete w/o Contrast w/ Doppler (06.24.20 @ 14:01):     The left ventricle is normal in size, wall thickness, wall motion and contractility. Estimated left ventricular ejection fraction is 60 %.   The right atrium is normal in size.   A catheter is noted in the right atrium.   The aortic valve is trileaflet with thin pliable leaflets.   The mitral valve leaflets appear thickened.   Trace mitral regurgitation is present.   The tricuspid valve leaflets appear mildly thickened and/or calcified, but open well.   Trace tricuspid valve regurgitation is present.   Trace pulmonic valvular regurgitation is present.    US Abdomen Complete (06.25.20 @ 17:46):     Avascular and mildly echogenic soft tissue mass surrounding portal vein, biliary ducts, and RIGHT hepatic artery of indeterminate etiology. FOLLOW-UP CONTRAST CT SCAN RECOMMENDED.,  Status post cholecystectomy.Mild splenomegaly.Fatty infiltration of liver.    Xray Chest 1 View-PORTABLE IMMEDIATE (06.23.20 @ 22:54):  1. Right IJ central venous catheter.  2. Nonspecific vertical, linear density in the peripheral right upper lobe, likely confluence of shadows/artifact or on the patient.  3. Otherwise, no acute cardiopulmonary disease findings.

## 2020-06-28 NOTE — PROGRESS NOTE ADULT - ASSESSMENT
40 yo female with hx of SLE, RA, ( formerly on MTX up to year ago - followed by Dr Del Rosario) , hx of IVDA and now heroin use  now presengint with weakness and found to be MRSA bacteremia and renal eval called for elevated creatinine    NATALIA vs NATALIA on CKD      multifactorial etiologies - hx of SLE and Raynaud's and in setting of bactaremia and lactic acidosis , cocaine use and chronic NSAID use       severe hypoalbuminemia noted  - chronic protein malnutrition vs liver       urine protein 1.9 gram - NOT nephrotic syndrome       renal imaging noted- neg hydro       - NO IV contrast due to renal function    - SLE + serologies   - IV abx per ID/ cardio eval to r/o SBE    - keep SBP > 110 for renal perfusion as able    - oral protein supplementation    - no ACE or ARB     MRSA bacteremia with septic shock now hypoxic resp failure w possible ARDS  minimal UOP response to diuretics and metabolic acidosis on ABG   d/w Dr Comer and pt family (Nilam Porter - Aunt) is relative in [place]  Uncle Douglas Mckeon - left message again     No acute indicaiton for HD   d/w family - aunt at length - see yesterday note    Aunt - nilam did not want aggressive treatment /HD     Abdominal Mass on sono    renal imaging noted- mass surrounding the portal vein, hepatic artery, and biliary duct - abscess??   d.w Dr RADHA Comer - GI eval pending   Abd CT      Prognosis guarded

## 2020-06-28 NOTE — PROGRESS NOTE ADULT - PROBLEM SELECTOR PLAN 1
MRSA bacteremia - receiving IV antibiotics; GRAHAM on hold given events of past 24-36 hours -- can pursue at later time depending on clinical course.

## 2020-06-28 NOTE — CONSULT NOTE ADULT - ASSESSMENT
38yo female with drug abuse, sepsis  likely large liver abscess  await ct scan when pt clinically stable  continue empiric abx and supportive care

## 2020-06-29 LAB
ALBUMIN SERPL ELPH-MCNC: 1.1 G/DL — LOW (ref 3.3–5)
ALP SERPL-CCNC: 362 U/L — HIGH (ref 40–120)
ALT FLD-CCNC: 25 U/L — SIGNIFICANT CHANGE UP (ref 12–78)
ANION GAP SERPL CALC-SCNC: 9 MMOL/L — SIGNIFICANT CHANGE UP (ref 5–17)
AST SERPL-CCNC: 74 U/L — HIGH (ref 15–37)
BASE EXCESS BLDA CALC-SCNC: 1.5 MMOL/L — SIGNIFICANT CHANGE UP (ref -2–2)
BILIRUB SERPL-MCNC: 0.7 MG/DL — SIGNIFICANT CHANGE UP (ref 0.2–1.2)
BLOOD GAS COMMENTS ARTERIAL: SIGNIFICANT CHANGE UP
BUN SERPL-MCNC: 64 MG/DL — HIGH (ref 7–23)
CALCIUM SERPL-MCNC: 7.1 MG/DL — LOW (ref 8.5–10.1)
CHLORIDE SERPL-SCNC: 96 MMOL/L — SIGNIFICANT CHANGE UP (ref 96–108)
CO2 SERPL-SCNC: 26 MMOL/L — SIGNIFICANT CHANGE UP (ref 22–31)
CREAT SERPL-MCNC: 2.26 MG/DL — HIGH (ref 0.5–1.3)
GAS PNL BLDA: SIGNIFICANT CHANGE UP
GLUCOSE SERPL-MCNC: 109 MG/DL — HIGH (ref 70–99)
GRAM STN FLD: SIGNIFICANT CHANGE UP
HCO3 BLDA-SCNC: 25 MMOL/L — SIGNIFICANT CHANGE UP (ref 21–29)
HCT VFR BLD CALC: 26 % — LOW (ref 34.5–45)
HGB BLD-MCNC: 9 G/DL — LOW (ref 11.5–15.5)
LACTATE SERPL-SCNC: 2.3 MMOL/L — HIGH (ref 0.7–2)
MCHC RBC-ENTMCNC: 29.9 PG — SIGNIFICANT CHANGE UP (ref 27–34)
MCHC RBC-ENTMCNC: 34.6 GM/DL — SIGNIFICANT CHANGE UP (ref 32–36)
MCV RBC AUTO: 86.4 FL — SIGNIFICANT CHANGE UP (ref 80–100)
PCO2 BLDA: 35 MMHG — SIGNIFICANT CHANGE UP (ref 32–46)
PH BLDA: 7.47 — HIGH (ref 7.35–7.45)
PLATELET # BLD AUTO: 44 K/UL — LOW (ref 150–400)
PO2 BLDA: 73 MMHG — LOW (ref 74–108)
POTASSIUM SERPL-MCNC: 3.6 MMOL/L — SIGNIFICANT CHANGE UP (ref 3.5–5.3)
POTASSIUM SERPL-SCNC: 3.6 MMOL/L — SIGNIFICANT CHANGE UP (ref 3.5–5.3)
PROT SERPL-MCNC: 5.4 GM/DL — LOW (ref 6–8.3)
RBC # BLD: 3.01 M/UL — LOW (ref 3.8–5.2)
RBC # FLD: 17.6 % — HIGH (ref 10.3–14.5)
SAO2 % BLDA: 95 % — SIGNIFICANT CHANGE UP (ref 92–96)
SODIUM SERPL-SCNC: 131 MMOL/L — LOW (ref 135–145)
SPECIMEN SOURCE: SIGNIFICANT CHANGE UP
WBC # BLD: 24.77 K/UL — HIGH (ref 3.8–10.5)
WBC # FLD AUTO: 24.77 K/UL — HIGH (ref 3.8–10.5)

## 2020-06-29 PROCEDURE — 99233 SBSQ HOSP IP/OBS HIGH 50: CPT

## 2020-06-29 PROCEDURE — 99291 CRITICAL CARE FIRST HOUR: CPT

## 2020-06-29 PROCEDURE — 71045 X-RAY EXAM CHEST 1 VIEW: CPT | Mod: 26

## 2020-06-29 PROCEDURE — 99231 SBSQ HOSP IP/OBS SF/LOW 25: CPT

## 2020-06-29 RX ADMIN — Medication 1 APPLICATION(S): at 10:33

## 2020-06-29 RX ADMIN — PIPERACILLIN AND TAZOBACTAM 25 GRAM(S): 4; .5 INJECTION, POWDER, LYOPHILIZED, FOR SOLUTION INTRAVENOUS at 06:06

## 2020-06-29 RX ADMIN — CHLORHEXIDINE GLUCONATE 15 MILLILITER(S): 213 SOLUTION TOPICAL at 06:07

## 2020-06-29 RX ADMIN — CHLORHEXIDINE GLUCONATE 15 MILLILITER(S): 213 SOLUTION TOPICAL at 17:42

## 2020-06-29 RX ADMIN — Medication 650 MILLIGRAM(S): at 11:26

## 2020-06-29 RX ADMIN — PROPOFOL 3.26 MICROGRAM(S)/KG/MIN: 10 INJECTION, EMULSION INTRAVENOUS at 21:30

## 2020-06-29 RX ADMIN — Medication 250 MILLIGRAM(S): at 06:06

## 2020-06-29 RX ADMIN — Medication 1 MILLIGRAM(S): at 10:33

## 2020-06-29 RX ADMIN — PIPERACILLIN AND TAZOBACTAM 25 GRAM(S): 4; .5 INJECTION, POWDER, LYOPHILIZED, FOR SOLUTION INTRAVENOUS at 14:23

## 2020-06-29 RX ADMIN — PANTOPRAZOLE SODIUM 40 MILLIGRAM(S): 20 TABLET, DELAYED RELEASE ORAL at 10:32

## 2020-06-29 RX ADMIN — PROPOFOL 3.26 MICROGRAM(S)/KG/MIN: 10 INJECTION, EMULSION INTRAVENOUS at 01:36

## 2020-06-29 RX ADMIN — Medication 100 MILLIGRAM(S): at 10:33

## 2020-06-29 RX ADMIN — PIPERACILLIN AND TAZOBACTAM 25 GRAM(S): 4; .5 INJECTION, POWDER, LYOPHILIZED, FOR SOLUTION INTRAVENOUS at 21:30

## 2020-06-29 NOTE — PROGRESS NOTE ADULT - ASSESSMENT
40 yo female with hx of SLE, RA, ( formerly on MTX up to year ago - followed by Dr Del Rosario) , hx of IVDA and now heroin use  now presengint with weakness and found to be MRSA bacteremia and renal eval called for elevated creatinine    NATALIA vs NATALIA on CKD      multifactorial etiologies - hx of SLE and Raynaud's and in setting of bactaremia and lactic acidosis , cocaine use and chronic NSAID use       severe hypoalbuminemia noted  - chronic protein malnutrition urine protein 1.9 gram - NOT nephrotic syndrome       renal imaging noted- neg hydro       - NO IV contrast due to renal function    - SLE + serologies - rheum fup when stable    - IV abx per ID/ cardio eval to r/o SBE    - keep SBP > 110 for renal perfusion as able    - oral protein supplementation    - no ACE or ARB    - continue IV lasix + albumin if needed to keep even balance     No acute indicaiton for HD   d/w family - aunt at length - see previous notes    Aunt -Gregoria Porter is relative in [place]  - would NOT aggressive measures including HD if requred    Uncle Douglas Mckeon - does not want to be involved in nieces's  care     MRSA bacteremia with septic shock now hypoxic resp failure w possible ARDS on pressors    IV abx /ID eval    ICU level of care    Cardiolgy fup regarding GRAHAM /endcarditis risk

## 2020-06-29 NOTE — PROGRESS NOTE ADULT - SUBJECTIVE AND OBJECTIVE BOX
Hospital # 5  CCU # 2  Vent # 2  [facility] CVL # 5    CC:  Sepsis     HPI:    38 y/o female with lupus, RA, reynards, epilepsy (last seizure 8 yrs ago), IVDA (clean for 4 years), who presented to ED today due to weakness. Pt states that her grandmother  approx. 3 months ago and for the past almost 2 months she has been snorting two bags of heroin a day. Pt states her heroin use is partially due to depression and anxiety since gma passed away however also due to financial reasons as can no longer afford oxycodone which she had been taking for her lupus/RA chronic pain as Rx. Pt states she has wounds all over her body from falls, ulcers from previous attempted injection sites, which are not healing and bleed (approx 2 tbs (RUE wound)) daily  Pt Moved to CCU on  for acute resp failure requiring intubation    :  Pt endorsed to me by Dr dey.  Pt seen and examined in CCU--vented and sedated--PAP --on phenyl gtt 0.3.  Multiple skin lesion--non healing ulcers and old track marks.  HIV negative.  COVID NEGATIVE.  Chest Chest-- Personally reviewed-- B/L GGO and consolidation R>L.  WBC 24.  Tm 101.7  Cr down 2.2  Chronically ill with Albumin 1    PMH:  As above.     PSH:  As above.     FH: Non Contributory other than those listed in HPI    Social History:  Unobtainable due to clinical condition     MEDICATIONS  (STANDING):  chlorhexidine 0.12% Liquid 15 milliLiter(s) Oral Mucosa every 12 hours  collagenase Ointment 1 Application(s) Topical daily  dextrose 5%. 1000 milliLiter(s) (50 mL/Hr) IV Continuous <Continuous>  dextrose 50% Injectable 12.5 Gram(s) IV Push once  dextrose 50% Injectable 25 Gram(s) IV Push once  dextrose 50% Injectable 25 Gram(s) IV Push once  folic acid 1 milliGRAM(s) Oral daily  norepinephrine Infusion 0.05 MICROgram(s)/kG/Min (5.1 mL/Hr) IV Continuous <Continuous>  pantoprazole  Injectable 40 milliGRAM(s) IV Push daily  phenylephrine    Infusion 0.5 MICROgram(s)/kG/Min (10.2 mL/Hr) IV Continuous <Continuous>  piperacillin/tazobactam IVPB.. 3.375 Gram(s) IV Intermittent every 8 hours  propofol Infusion 10 MICROgram(s)/kG/Min (3.26 mL/Hr) IV Continuous <Continuous>  silver sulfADIAZINE 1% Cream 1 Application(s) Topical daily  thiamine 100 milliGRAM(s) Oral daily  vancomycin  IVPB 750 milliGRAM(s) IV Intermittent daily    MEDICATIONS  (PRN):  acetaminophen   Tablet .. 650 milliGRAM(s) Oral every 4 hours PRN Temp greater or equal to 38C (100.4F), Mild Pain (1 - 3)  dextrose 40% Gel 15 Gram(s) Oral once PRN Blood Glucose LESS THAN 70 milliGRAM(s)/deciLiter  glucagon  Injectable 1 milliGRAM(s) IntraMuscular once PRN Glucose <70 milliGRAM(s)/deciLiter      Allergies: NKDA    ROS:  SEE BELOW        ICU Vital Signs Last 24 Hrs  T(C): 37.9 (2020 05:24), Max: 38.3 (2020 22:00)  T(F): 100.2 (2020 05:24), Max: 100.9 (2020 22:00)  HR: 121 (2020 08:00) (102 - 133)  BP: 111/75 (2020 08:00) (81/51 - 113/74)  BP(mean): 83 (2020 08:00) (57 - 89)  ABP: --  ABP(mean): --  RR: --  SpO2: 98% (2020 08:00) (93% - 100%)      Mode: AC/ CMV (Assist Control/ Continuous Mandatory Ventilation)  RR (machine): 20  TV (machine): 400  FiO2: 40  PEEP: 5  ITime: 1  MAP: 12  PIP: 26      I&O's Summary    2020 07:01  -  2020 07:00  --------------------------------------------------------  IN: 1551.7 mL / OUT: 600 mL / NET: 951.7 mL        Physical Exam:  SEE BELOW                          9.0    24.77 )-----------( 44       ( 2020 06:00 )             26.0       -    131<L>  |  96  |  64<H>  ----------------------------<  109<H>  3.6   |  26  |  2.26<H>    Ca    7.1<L>      2020 06:00  Phos  3.2       Mg     2.1         TPro  5.4<L>  /  Alb  1.1<L>  /  TBili  0.7  /  DBili  x   /  AST  74<H>  /  ALT  25  /  AlkPhos  362<H>              ABG - ( 2020 05:19 )  pH, Arterial: 7.47  pH, Blood: x     /  pCO2: 35    /  pO2: 73    / HCO3: 25    / Base Excess: 1.5   /  SaO2: 95                      DVT Prophylaxis:                                                            Contraindication:     Advanced Directives:    Discussed with:    Visit Information:  Time spent excluding procedure:  45 cc mins    ** Time is exclusive of billed procedures and/or teaching and/or routine family updates.

## 2020-06-29 NOTE — PROGRESS NOTE ADULT - SUBJECTIVE AND OBJECTIVE BOX
39 year old woman with a history of Lupus, RA, Raynaud's seizure disorder, anxiety/depression, and drug abuse who presented to the ER on 6/23/2020 with complaints of weakness and fever; diagnosed with MRSA bacteremia --  cardiology was consulted on 6/26 with request for GRAHAM and procedure has been scheduled.    6/27/20:  Overnight events noted and discussed with Dr Comer (acute respiratory failure, lactic acidosis).  6/28/20: Sedated on vent  6/29/'20: remains sedated on vent.      MEDICATIONS:  MEDICATIONS  (STANDING):  chlorhexidine 0.12% Liquid 15 milliLiter(s) Oral Mucosa every 12 hours  collagenase Ointment 1 Application(s) Topical daily  dextrose 5%. 1000 milliLiter(s) (50 mL/Hr) IV Continuous <Continuous>  dextrose 50% Injectable 12.5 Gram(s) IV Push once  dextrose 50% Injectable 25 Gram(s) IV Push once  dextrose 50% Injectable 25 Gram(s) IV Push once  folic acid 1 milliGRAM(s) Oral daily  norepinephrine Infusion 0.05 MICROgram(s)/kG/Min (5.1 mL/Hr) IV Continuous <Continuous>  pantoprazole  Injectable 40 milliGRAM(s) IV Push daily  phenylephrine    Infusion 0.5 MICROgram(s)/kG/Min (10.2 mL/Hr) IV Continuous <Continuous>  piperacillin/tazobactam IVPB.. 3.375 Gram(s) IV Intermittent every 8 hours  propofol Infusion 10 MICROgram(s)/kG/Min (3.26 mL/Hr) IV Continuous <Continuous>  silver sulfADIAZINE 1% Cream 1 Application(s) Topical daily  thiamine 100 milliGRAM(s) Oral daily  vancomycin  IVPB 750 milliGRAM(s) IV Intermittent daily    MEDICATIONS  (PRN):  acetaminophen   Tablet .. 650 milliGRAM(s) Oral every 4 hours PRN Temp greater or equal to 38C (100.4F), Mild Pain (1 - 3)  dextrose 40% Gel 15 Gram(s) Oral once PRN Blood Glucose LESS THAN 70 milliGRAM(s)/deciLiter  glucagon  Injectable 1 milliGRAM(s) IntraMuscular once PRN Glucose <70 milliGRAM(s)/deciLiter      Vital Signs Last 24 Hrs  T(C): 36.7 (29 Jun 2020 09:16), Max: 38.3 (28 Jun 2020 22:00)  T(F): 98.1 (29 Jun 2020 09:16), Max: 100.9 (28 Jun 2020 22:00)  HR: 127 (29 Jun 2020 10:00) (102 - 133)  BP: 109/72 (29 Jun 2020 10:00) (90/57 - 123/85)  BP(mean): 80 (29 Jun 2020 10:00) (64 - 94)  RR: --  SpO2: 99% (29 Jun 2020 10:00) (93% - 100%)    I&O's Summary    28 Jun 2020 07:01  -  29 Jun 2020 07:00  --------------------------------------------------------  IN: 1551.7 mL / OUT: 600 mL / NET: 951.7 mL        PHYSICAL EXAM:    Constitutional: Semirecumbent in bed on vent, sedated, appears older than stated age  HEENT: ETT to vent  Pulmonary: Bilateral breath sounds with scattered rhonchi  Cardiovascular: Tachycardic, regular, diffuse edema  extremities: multiple ulcers    LABS: All Labs Reviewed:                        9.0    24.77 )-----------( 44       ( 29 Jun 2020 06:00 )             26.0                         8.7    11.61 )-----------( 30       ( 28 Jun 2020 06:00 )             25.7                         9.3    14.64 )-----------( 54       ( 27 Jun 2020 22:20 )             28.0     29 Jun 2020 06:00    131    |  96     |  64     ----------------------------<  109    3.6     |  26     |  2.26   28 Jun 2020 06:00    134    |  99     |  56     ----------------------------<  107    3.8     |  27     |  2.32   27 Jun 2020 22:20    137    |  102    |  57     ----------------------------<  97     4.5     |  26     |  2.39     Ca    7.1        29 Jun 2020 06:00  Ca    6.7        28 Jun 2020 06:00  Ca    7.2        27 Jun 2020 22:20  Phos  3.2       28 Jun 2020 06:00  Phos  3.3       27 Jun 2020 22:20  Mg     2.1       28 Jun 2020 06:00  Mg     1.8       27 Jun 2020 22:20    TPro  5.4    /  Alb  1.1    /  TBili  0.7    /  DBili  x      /  AST  74     /  ALT  25     /  AlkPhos  362    29 Jun 2020 06:00  TPro  4.8    /  Alb  0.9    /  TBili  0.7    /  DBili  x      /  AST  66     /  ALT  21     /  AlkPhos  275    28 Jun 2020 06:00  TPro  5.2    /  Alb  1.1    /  TBili  0.8    /  DBili  x      /  AST  78     /  ALT  25     /  AlkPhos  328    27 Jun 2020 22:20      LABS:       CARDIAC MARKERS ( 27 Jun 2020 07:20 ) 0.250 ng/mL / x     / x     / x     / x      CARDIAC MARKERS ( 27 Jun 2020 03:35 ) 0.127 ng/mL / x     / x     / x     / x         12 Lead ECG (06.24.20 @ 10:51):  Normal sinus rhythm, Low voltage QRS    TTE Echo Complete w/o Contrast w/ Doppler (06.24.20 @ 14:01):     The left ventricle is normal in size, wall thickness, wall motion and contractility. Estimated left ventricular ejection fraction is 60 %.   The right atrium is normal in size.   A catheter is noted in the right atrium.   The aortic valve is trileaflet with thin pliable leaflets.   The mitral valve leaflets appear thickened.   Trace mitral regurgitation is present.   The tricuspid valve leaflets appear mildly thickened and/or calcified, but open well.   Trace tricuspid valve regurgitation is present.   Trace pulmonic valvular regurgitation is present.    US Abdomen Complete (06.25.20 @ 17:46):     Avascular and mildly echogenic soft tissue mass surrounding portal vein, biliary ducts, and RIGHT hepatic artery of indeterminate etiology. FOLLOW-UP CONTRAST CT SCAN RECOMMENDED.,  Status post cholecystectomy.Mild splenomegaly.Fatty infiltration of liver.    Xray Chest 1 View-PORTABLE IMMEDIATE (06.23.20 @ 22:54):  1. Right IJ central venous catheter.  2. Nonspecific vertical, linear density in the peripheral right upper lobe, likely confluence of shadows/artifact or on the patient.  3. Otherwise, no acute cardiopulmonary disease findings.

## 2020-06-29 NOTE — PROGRESS NOTE ADULT - ASSESSMENT
40 yo female with multiple medical issues.   Sepsis 38 yo female with multiple medical issues noted to have ?liver abscess on sonogram.   CT scan did note liver abscess   Will no longer follow, please call if needed.   Discussed w/ CCU and Dr. Cisneros.

## 2020-06-29 NOTE — PROGRESS NOTE ADULT - ASSESSMENT
IMP:    38 y/o female with lupus, RA, reynards, epilepsy (last seizure 8 yrs ago) and IVDA admitted with MRSA sepsis and septic shock--likely DIC and prob embolic phenomenal to /renal system  Thrombocytopenia   Prob NATALIA--unknown baseline Cr.  No indication for emergent RRT   Acute type 1 resp failure with criteria for Mod ARDS (P/F ratio 180)  TM Encephalopathy   Chronically ill given Albumin around 1    High risk for acute decompensation and deterioration including death.  Critically ill  DNR as d/w dr dey--as he has had conversation with Aunt and uncle     Plan:    Full vent support--LPV strategy to maintain plateau pressures < 30--High PEEP/low TV--Permissive hypercapnea and acidemia--not candiate for weaning  Cont with IV vanco (4) and Pip/charisse (3)  Actively titrate pressors to MAP > 60  Keep reynoso in another 24 hrs and DC tomorrow if renal Fx remains stable  HOB > 30  TF as per protocol  Keep CVL in   DVT prophy--LLE SCD--no chemical given low plats    CCU care-- d/w ICU staff on multi disciplinary rounds

## 2020-06-29 NOTE — PROGRESS NOTE ADULT - SUBJECTIVE AND OBJECTIVE BOX
Patient is a 39y old  Female who presents with a chief complaint of Cellulitis with sepsis, symptomatic anemia. (2020 11:21)    HPI:  Pt is a 40 yo female with a pmh/o lupus, RA, reynards, epilepsy (last seizure 8 yrs ago), IVDA (clean for 4 years), who presented to ED today due to weakness. Pt states that her grandmother  approx. 3 months ago and for the past almost 2 months she has been snorting two bags of heroin a day. Pt states her heroin use is partially due to depression and anxiety since gma passed away however also due to financial reasons as can no longer afford oxycodone which she had been taking for her lupus/RA chronic pain as Rx. Pt states she has wounds all over her body from falls, ulcers from previous attempted injection sites, which are not healing and bleed (approx 2 tbs (RUE wound)) daily. Pt describes weakness as fatigue, endorses sob with exertion, weight loss due to decreased appetite from depression and drug use, palpitations. Denies n/v/d, constipation, HA, hematemesis, hematochezia, dark stools, abd pain, cp, fevers, cough, leg swelling.     In ED pt found to have lactate 3.9 with Hgb of 5.5, , T 100.4. (2020 01:21)    2020-  Intubated, sedated.     PAST MEDICAL & SURGICAL HISTORY:  Smoker  Heroin abuse  H/O Raynaud's syndrome  Rheumatoid arthritis  Lupus  Gall bladder stones  H/O appendicitis  H/O tubal ligation    MEDICATIONS  (STANDING):  chlorhexidine 0.12% Liquid 15 milliLiter(s) Oral Mucosa every 12 hours  collagenase Ointment 1 Application(s) Topical daily  dextrose 5%. 1000 milliLiter(s) (50 mL/Hr) IV Continuous <Continuous>  dextrose 50% Injectable 12.5 Gram(s) IV Push once  dextrose 50% Injectable 25 Gram(s) IV Push once  dextrose 50% Injectable 25 Gram(s) IV Push once  folic acid 1 milliGRAM(s) Oral daily  norepinephrine Infusion 0.05 MICROgram(s)/kG/Min (5.1 mL/Hr) IV Continuous <Continuous>  pantoprazole  Injectable 40 milliGRAM(s) IV Push daily  phenylephrine    Infusion 0.5 MICROgram(s)/kG/Min (10.2 mL/Hr) IV Continuous <Continuous>  piperacillin/tazobactam IVPB.. 3.375 Gram(s) IV Intermittent every 8 hours  propofol Infusion 10 MICROgram(s)/kG/Min (3.26 mL/Hr) IV Continuous <Continuous>  silver sulfADIAZINE 1% Cream 1 Application(s) Topical daily  thiamine 100 milliGRAM(s) Oral daily  vancomycin  IVPB 750 milliGRAM(s) IV Intermittent daily    MEDICATIONS  (PRN):  acetaminophen   Tablet .. 650 milliGRAM(s) Oral every 4 hours PRN Temp greater or equal to 38C (100.4F), Mild Pain (1 - 3)  dextrose 40% Gel 15 Gram(s) Oral once PRN Blood Glucose LESS THAN 70 milliGRAM(s)/deciLiter  glucagon  Injectable 1 milliGRAM(s) IntraMuscular once PRN Glucose <70 milliGRAM(s)/deciLiter    Allergies    morphine (Unknown)    Intolerances      SOCIAL HISTORY:  FAMILY HISTORY:  No pertinent family history in first degree relatives: cannot recall family history at this time    REVIEW OF SYSTEMS:  Unable to obtain due to above.     Vital Signs Last 24 Hrs  T(C): 37.6 (2020 12:00), Max: 38.6 (2020 11:00)  T(F): 99.7 (2020 12:00), Max: 101.4 (2020 11:00)  HR: 120 (2020 11:00) (106 - 133)  BP: 109/72 (2020 10:00) (90/57 - 123/85)  BP(mean): 80 (2020 10:00) (64 - 94)  RR: --  SpO2: 99% (2020 11:00) (93% - 100%)    PHYSICAL EXAM:  Constitutional: Middle aged female with multiple medical issues, appears chronically ill.   Respiratory: CTAB  Cardiovascular: S1 and S2, RRR, no M/G/R  Gastrointestinal: BS+, soft, NT/ND    LABS:                        9.0    24.77 )-----------( 44       ( 2020 06:00 )             26.0     06-29    131<L>  |  96  |  64<H>  ----------------------------<  109<H>  3.6   |  26  |  2.26<H>    Ca    7.1<L>      2020 06:00  Phos  3.2       Mg     2.1         TPro  5.4<L>  /  Alb  1.1<L>  /  TBili  0.7  /  DBili  x   /  AST  74<H>  /  ALT  25  /  AlkPhos  362<H>        LIVER FUNCTIONS - ( 2020 06:00 )  Alb: 1.1 g/dL / Pro: 5.4 gm/dL / ALK PHOS: 362 U/L / ALT: 25 U/L / AST: 74 U/L / GGT: x             RADIOLOGY & ADDITIONAL STUDIES:

## 2020-06-29 NOTE — PROGRESS NOTE ADULT - SUBJECTIVE AND OBJECTIVE BOX
Pt is a 40 yo female with a pm hx of SLE ,RA ( last seen by dr medina 1 year ago?)   ,reynards, epilepsy (last seizure 8 yrs ago), IVDA (clean for 4 years), who presented to ED today due to weakness. Pt states that her grandmother  approx. 3 months ago and for the past almost 2 months she has been snorting two bags of heroin a day. Pt states her heroin use is partially due to depression and anxiety since she passed away however also due to financial reasons as can no longer afford oxycodone which she had been taking for her lupus/RA chronic pain as Rx. Pt states she has wounds all over her body from falls, ulcers from previous attempted injection sites, which are not healing and bleeding    Pt describes weakness as fatigue, endorses sob with exertion, weight loss due to decreased appetite from depression and drug use, palpitations. Denies n/v/d, constipation, HA, hematemesis, hematochezia, dark stools, abd pain, cp, fevers, cough, leg swelling.   In ED pt found to have lactate 3.9 with Hgb of 5.5, , T 100.4. pt was admitted to ICU   renal eval called for elevated Creatinine- no previous Cr available to compare  last seen by a physician 1 year ago . pt denies hx of renal disease in past  pt admits to daily Mobic use previously and now daily Advil use at home    remains intubated on pressors    no overnight events         PAST MEDICAL & SURGICAL HISTORY:  Smoker  Heroin abuse  H/O Raynaud's syndrome  Rheumatoid arthritis  Lupus  Gall bladder stones  H/O appendicitis  H/O tubal ligation      MEDICATIONS  (STANDING):  chlorhexidine 0.12% Liquid 15 milliLiter(s) Oral Mucosa every 12 hours  collagenase Ointment 1 Application(s) Topical daily  dextrose 5%. 1000 milliLiter(s) (50 mL/Hr) IV Continuous <Continuous>  dextrose 50% Injectable 12.5 Gram(s) IV Push once  dextrose 50% Injectable 25 Gram(s) IV Push once  dextrose 50% Injectable 25 Gram(s) IV Push once  folic acid 1 milliGRAM(s) Oral daily  norepinephrine Infusion 0.05 MICROgram(s)/kG/Min (5.1 mL/Hr) IV Continuous <Continuous>  pantoprazole  Injectable 40 milliGRAM(s) IV Push daily  phenylephrine    Infusion 0.5 MICROgram(s)/kG/Min (10.2 mL/Hr) IV Continuous <Continuous>  piperacillin/tazobactam IVPB.. 3.375 Gram(s) IV Intermittent every 8 hours  propofol Infusion 10 MICROgram(s)/kG/Min (3.26 mL/Hr) IV Continuous <Continuous>  silver sulfADIAZINE 1% Cream 1 Application(s) Topical daily  thiamine 100 milliGRAM(s) Oral daily  vancomycin  IVPB 750 milliGRAM(s) IV Intermittent daily    MEDICATIONS  (PRN):  acetaminophen   Tablet .. 650 milliGRAM(s) Oral every 4 hours PRN Temp greater or equal to 38C (100.4F), Mild Pain (1 - 3)  dextrose 40% Gel 15 Gram(s) Oral once PRN Blood Glucose LESS THAN 70 milliGRAM(s)/deciLiter  glucagon  Injectable 1 milliGRAM(s) IntraMuscular once PRN Glucose <70 milliGRAM(s)/deciLiter      Allergies    morphine (Unknown)    Intolerances    ROS:    limited by pt mental status     Vital Signs Last 24 Hrs  T(C): 37.3 (2020 17:00), Max: 39.9 (2020 15:38)  T(F): 99.1 (2020 17:00), Max: 103.8 (2020 15:38)  HR: 118 (2020 17:00) (106 - 137)  BP: 99/70 (2020 17:00) (97/60 - 123/85)  BP(mean): 76 (2020 17:00) (68 - 94)  RR: --  SpO2: 97% (2020 17:00) (93% - 100%)    I&O's Detail    2020 07:01  -  2020 07:00  --------------------------------------------------------  IN:    dextrose 5%: 150 mL    Free Water: 180 mL    Nepro: 777 mL    phenylephrine   Infusion: 106.7 mL    propofol Infusion: 188 mL    Solution: 50 mL    Solution: 100 mL  Total IN: 1551.7 mL    OUT:    Indwelling Catheter - Urethral: 600 mL  Total OUT: 600 mL    Total NET: 951.7 mL      2020 07:01  -  2020 17:34  --------------------------------------------------------  IN:  Total IN: 0 mL    OUT:    Voided: 300 mL  Total OUT: 300 mL    Total NET: -300 mL        PHYSICAL EXAM:    Constitutional: now intubated   HEENT: MM + dry   Neck: No LAD, No JVD  Respiratory: poor AE   Cardiovascular: S1 and S2  Gastrointestinal: BS+, soft, NT/ND  Extremities: peripheral edema ++ 3-4 w multiple lessions and ulces on upper and lower ext   Neurological: intubated ,    : No Naomi  Skin: multiple lesions/rashes and weeping areas   Access: Not applicable    LABS:              131    |  96     |  64     ----------------------------<  109       2020 06:00  3.6     |  26     |  2.26     134    |  99     |  56     ----------------------------<  107       2020 06:00  3.8     |  27     |  2.32     137    |  102    |  57     ----------------------------<  97        2020 22:20  4.5     |  26     |  2.39     Ca    7.1        2020 06:00  Ca    6.7        2020 06:00    Phos  3.2       2020 06:00  Phos  3.3       2020 22:20    Mg     2.1       2020 06:00  Mg     1.8       2020 22:20    TPro  5.4    /  Alb  1.1    /  TBili  0.7    /        2020 06:00  DBili  x      /  AST  74     /  ALT  25     /  AlkPhos  362      TPro  4.8    /  Alb  0.9    /  TBili  0.7    /        2020 06:00  DBili  x      /  AST  66     /  ALT  21     /  AlkPhos  275            Double Stranded DNA Antibody: 428: Method: EIA            Reference Ranges            Interpretation            <= 29    IU/mL     Negative            30 - 75  IU/mL     Borderline            > 75      IU/mL     Positive IU/mL (20 @ 05:54)      C3 Complement, Serum: 54 mg/dL (20 @ 05:54)    C4 Complement, Serum: 10 mg/dL (20 @ 05:54)    HIV-1/2 Combo Result: Nonreact:   Acute Hepatitis Panel (20 @ 07:05)   Hepatitis C Virus S/CO Ratio: 0.29 S/CO    Hepatitis B Core IgM Antibody: Nonreact    Hepatitis B Surface Antigen: Nonreact    Hepatitis A IgM Antibody: Nonreact        Urine Microscopic-Add On (NC) (20 @ 21:20)    Red Blood Cell - Urine: >50 /HPF    White Blood Cell - Urine: 6-10    Bacteria: Many    Comment - Urine: Moderate Trichomonas    Epithelial Cells: Many    Protein/Creatinine Ratio Calculation: 1.9 Ratio (20 @ 21:20)    Sodium, Random Urine: <20:     Urine Studies:  Urinalysis Basic - ( 2020 05:41 )    Color: Ale / Appearance: Slightly Turbid / S.010 / pH: x  Gluc: x / Ketone: Negative  / Bili: Negative / Urobili: 1 mg/dL   Blood: x / Protein: 30 mg/dL / Nitrite: Negative   Leuk Esterase: Moderate / RBC: 11-25 /HPF / WBC 3-5   Sq Epi: x / Non Sq Epi: Negative / Bacteria: Many      RADIOLOGY & ADDITIONAL STUDIES:    INTERPRETATION:  Ultrasound of the abdomen       CLINICAL INFORMATION: Acute renal injury. History of drug abuse. Sepsis. Assess for hydronephrosis. Assess for liver cirrhosis.    TECHNIQUE:   Transcutaneous ultrasonography of the abdomen was performed.    FINDINGS:    No previous examinations are available for review.  There is a hypoechoic complex avascular soft tissue tissue 3.4 x 3.0 x 4.4 cm mass surrounding the portal vein, RIGHT hepatic artery and biliary ducts of indeterminate etiology. Abdominal contrast CT scan recommended to differentiate origin of this mass, either from liver, pancreas or bowel. No recent prior CT scan available for comparison.    The pancreas is grossly intact.    There is diffuse fatty infiltration of liver parenchyma measuring 18 cm in length. No focal mass. Hepatic and portal veins appear patent and are not displaced.  No intrahepatic or ductal dilatation..  The common duct  measures 0.84 cm.       Status post cholecystectomy. .      There is mild splenomegaly, spleen measuring 13.2 cm in length. The right kidney measures 11.8 cm in length.  It demonstrates no mass, calculus or hydronephrosis. Trace fluid in Lyman's pouch.    The left kidney not visualized due to extensive amount of bowel gas and inability to turn patient.    Visualized segments of abdominal aorta are within normal limits by sonographic criteria. IVC is unremarkable.  .  IMPRESSION:   Avascular and mildly echogenic soft tissue mass surrounding portal vein, biliary ducts, and RIGHT hepatic artery of indeterminate etiology. FOLLOW-UP CONTRAST CT SCAN RECOMMENDED.,  Status post cholecystectomy.  Mild splenomegaly.  Fatty infiltration ofliver.

## 2020-06-29 NOTE — PROGRESS NOTE ADULT - PROBLEM SELECTOR PLAN 1
MRSA bacteremia - receiving IV antibiotics; GRAHAM on hold after discussion w/ ICU Jun 27th & 28th.  Poor prognosis, currently DNR/DNI.  Will hold off on GRAHAM for now, will reconsider if clinical course improves.

## 2020-06-30 LAB
ANION GAP SERPL CALC-SCNC: 10 MMOL/L — SIGNIFICANT CHANGE UP (ref 5–17)
BUN SERPL-MCNC: 72 MG/DL — HIGH (ref 7–23)
CALCIUM SERPL-MCNC: 7 MG/DL — LOW (ref 8.5–10.1)
CHLORIDE SERPL-SCNC: 94 MMOL/L — LOW (ref 96–108)
CO2 SERPL-SCNC: 26 MMOL/L — SIGNIFICANT CHANGE UP (ref 22–31)
CREAT SERPL-MCNC: 2.25 MG/DL — HIGH (ref 0.5–1.3)
GLUCOSE SERPL-MCNC: 84 MG/DL — SIGNIFICANT CHANGE UP (ref 70–99)
HCT VFR BLD CALC: 26.7 % — LOW (ref 34.5–45)
HGB BLD-MCNC: 9.1 G/DL — LOW (ref 11.5–15.5)
MAGNESIUM SERPL-MCNC: 2 MG/DL — SIGNIFICANT CHANGE UP (ref 1.6–2.6)
MCHC RBC-ENTMCNC: 29.5 PG — SIGNIFICANT CHANGE UP (ref 27–34)
MCHC RBC-ENTMCNC: 34.1 GM/DL — SIGNIFICANT CHANGE UP (ref 32–36)
MCV RBC AUTO: 86.7 FL — SIGNIFICANT CHANGE UP (ref 80–100)
PHOSPHATE SERPL-MCNC: 3.8 MG/DL — SIGNIFICANT CHANGE UP (ref 2.5–4.5)
PLATELET # BLD AUTO: 62 K/UL — LOW (ref 150–400)
POTASSIUM SERPL-MCNC: 3.5 MMOL/L — SIGNIFICANT CHANGE UP (ref 3.5–5.3)
POTASSIUM SERPL-SCNC: 3.5 MMOL/L — SIGNIFICANT CHANGE UP (ref 3.5–5.3)
RBC # BLD: 3.08 M/UL — LOW (ref 3.8–5.2)
RBC # FLD: 17.3 % — HIGH (ref 10.3–14.5)
SODIUM SERPL-SCNC: 130 MMOL/L — LOW (ref 135–145)
VANCOMYCIN TROUGH SERPL-MCNC: 29.8 UG/ML — CRITICAL HIGH (ref 10–20)
WBC # BLD: 23.31 K/UL — HIGH (ref 3.8–10.5)
WBC # FLD AUTO: 23.31 K/UL — HIGH (ref 3.8–10.5)

## 2020-06-30 PROCEDURE — 99233 SBSQ HOSP IP/OBS HIGH 50: CPT

## 2020-06-30 PROCEDURE — 99291 CRITICAL CARE FIRST HOUR: CPT

## 2020-06-30 RX ORDER — OXYCODONE HYDROCHLORIDE 5 MG/1
5 TABLET ORAL EVERY 6 HOURS
Refills: 0 | Status: DISCONTINUED | OUTPATIENT
Start: 2020-06-30 | End: 2020-07-05

## 2020-06-30 RX ORDER — VANCOMYCIN HCL 1 G
500 VIAL (EA) INTRAVENOUS DAILY
Refills: 0 | Status: COMPLETED | OUTPATIENT
Start: 2020-07-01 | End: 2020-07-09

## 2020-06-30 RX ORDER — HYDROXYZINE HCL 10 MG
50 TABLET ORAL EVERY 4 HOURS
Refills: 0 | Status: DISCONTINUED | OUTPATIENT
Start: 2020-06-30 | End: 2020-07-31

## 2020-06-30 RX ORDER — LOPERAMIDE HCL 2 MG
2 TABLET ORAL EVERY 4 HOURS
Refills: 0 | Status: DISCONTINUED | OUTPATIENT
Start: 2020-06-30 | End: 2020-07-31

## 2020-06-30 RX ADMIN — PIPERACILLIN AND TAZOBACTAM 25 GRAM(S): 4; .5 INJECTION, POWDER, LYOPHILIZED, FOR SOLUTION INTRAVENOUS at 14:28

## 2020-06-30 RX ADMIN — Medication 650 MILLIGRAM(S): at 22:10

## 2020-06-30 RX ADMIN — CHLORHEXIDINE GLUCONATE 15 MILLILITER(S): 213 SOLUTION TOPICAL at 18:34

## 2020-06-30 RX ADMIN — PIPERACILLIN AND TAZOBACTAM 25 GRAM(S): 4; .5 INJECTION, POWDER, LYOPHILIZED, FOR SOLUTION INTRAVENOUS at 05:51

## 2020-06-30 RX ADMIN — Medication 100 MILLIGRAM(S): at 12:11

## 2020-06-30 RX ADMIN — OXYCODONE HYDROCHLORIDE 5 MILLIGRAM(S): 5 TABLET ORAL at 20:52

## 2020-06-30 RX ADMIN — Medication 2 MILLIGRAM(S): at 20:52

## 2020-06-30 RX ADMIN — PROPOFOL 3.26 MICROGRAM(S)/KG/MIN: 10 INJECTION, EMULSION INTRAVENOUS at 20:52

## 2020-06-30 RX ADMIN — PROPOFOL 3.26 MICROGRAM(S)/KG/MIN: 10 INJECTION, EMULSION INTRAVENOUS at 05:51

## 2020-06-30 RX ADMIN — Medication 1 APPLICATION(S): at 12:10

## 2020-06-30 RX ADMIN — PIPERACILLIN AND TAZOBACTAM 25 GRAM(S): 4; .5 INJECTION, POWDER, LYOPHILIZED, FOR SOLUTION INTRAVENOUS at 21:33

## 2020-06-30 RX ADMIN — Medication 1 MILLIGRAM(S): at 12:10

## 2020-06-30 RX ADMIN — CHLORHEXIDINE GLUCONATE 15 MILLILITER(S): 213 SOLUTION TOPICAL at 05:24

## 2020-06-30 RX ADMIN — PANTOPRAZOLE SODIUM 40 MILLIGRAM(S): 20 TABLET, DELAYED RELEASE ORAL at 12:10

## 2020-06-30 NOTE — PROGRESS NOTE ADULT - ASSESSMENT
38 yo female with hx of SLE, RA, ( formerly on MTX up to year ago - followed by Dr Del Rosario) , hx of IVDA and now heroin use  now presengint with weakness and found to be MRSA bacteremia and renal eval called for elevated creatinine    NATALIA vs NATALIA on CKD     - IV abx per ID/ cardio eval to r/o SBE    - keep SBP > 110 for renal perfusion as able    - oral protein supplementation    - no ACE or ARB     No acute indicaiton for HD, per previous discussion family would not want to pursue    MRSA bacteremia with septic shock now hypoxic resp failure w possible ARDS on pressors    IV abx /ID    ICU level of care    Cardiolgy fup regarding GRAHAM /endcarditis risk        d/c with ICU staff  seen earlier, note now

## 2020-06-30 NOTE — PROGRESS NOTE ADULT - ASSESSMENT
IMP:    40 y/o female with lupus, RA, reynards, epilepsy (last seizure 8 yrs ago) and IVDA admitted with MRSA sepsis and septic shock--likely DIC and prob embolic phenomenal to /renal system  Thrombocytopenia   Prob NATALIA--unknown baseline Cr.  No indication for emergent RRT   Acute type 1 resp failure with criteria for Mod ARDS (P/F ratio 180)  TM Encephalopathy   Chronically ill given Albumin around 1    High risk for acute decompensation and deterioration including death.  Critically ill  DNR as d/w dr dey--as he has had conversation with Aunt and uncle     Plan:    Full vent support--LPV strategy to maintain plateau pressures < 30--High PEEP/low TV--Permissive hypercapnea and acidemia--not candiate for weaning  Cont with IV vanco (5) and Pip/charisse (4)  Actively titrate pressors to MAP > 60  Wean sedation down for neuro eval  DC reynoso  HOB > 30  TF as per protocol  Keep CVL in   DVT prophy--LLE SCD--no chemical given low plats    CCU care-- d/w ICU staff on multi disciplinary rounds

## 2020-06-30 NOTE — PROGRESS NOTE ADULT - SUBJECTIVE AND OBJECTIVE BOX
39 year old woman with a history of Lupus, RA, Raynaud's seizure disorder, anxiety/depression, and drug abuse who presented to the ER on 6/23/2020 with complaints of weakness and fever; diagnosed with MRSA bacteremia --  cardiology was consulted on 6/26 with request for GRAHAM and procedure has been scheduled.    6/27/20:  Overnight events noted and discussed with Dr Comer (acute respiratory failure, lactic acidosis).  6/28/20: Sedated on vent  6/29/'20: remains sedated on vent.  6/30/'20: remains intubated, sedated.    MEDICATIONS:  MEDICATIONS  (STANDING):  chlorhexidine 0.12% Liquid 15 milliLiter(s) Oral Mucosa every 12 hours  collagenase Ointment 1 Application(s) Topical daily  dextrose 5%. 1000 milliLiter(s) (50 mL/Hr) IV Continuous <Continuous>  dextrose 50% Injectable 12.5 Gram(s) IV Push once  dextrose 50% Injectable 25 Gram(s) IV Push once  dextrose 50% Injectable 25 Gram(s) IV Push once  folic acid 1 milliGRAM(s) Oral daily  pantoprazole  Injectable 40 milliGRAM(s) IV Push daily  phenylephrine    Infusion 0.5 MICROgram(s)/kG/Min (10.2 mL/Hr) IV Continuous <Continuous>  piperacillin/tazobactam IVPB.. 3.375 Gram(s) IV Intermittent every 8 hours  propofol Infusion 10 MICROgram(s)/kG/Min (3.26 mL/Hr) IV Continuous <Continuous>  silver sulfADIAZINE 1% Cream 1 Application(s) Topical daily  thiamine 100 milliGRAM(s) Oral daily    MEDICATIONS  (PRN):  acetaminophen   Tablet .. 650 milliGRAM(s) Oral every 4 hours PRN Temp greater or equal to 38C (100.4F), Mild Pain (1 - 3)  dextrose 40% Gel 15 Gram(s) Oral once PRN Blood Glucose LESS THAN 70 milliGRAM(s)/deciLiter  glucagon  Injectable 1 milliGRAM(s) IntraMuscular once PRN Glucose <70 milliGRAM(s)/deciLiter      Vital Signs Last 24 Hrs  T(C): 36.8 (30 Jun 2020 10:45), Max: 37.3 (30 Jun 2020 03:00)  T(F): 98.3 (30 Jun 2020 10:45), Max: 99.2 (30 Jun 2020 03:00)  HR: 120 (30 Jun 2020 16:01) (85 - 121)  BP: 118/88 (30 Jun 2020 16:00) (99/68 - 149/97)  BP(mean): 94 (30 Jun 2020 16:00) (75 - 105)  RR: --  SpO2: 100% (30 Jun 2020 16:01) (94% - 100%)    I&O's Summary    29 Jun 2020 07:01  -  30 Jun 2020 07:00  --------------------------------------------------------  IN: 1431 mL / OUT: 900 mL / NET: 531 mL        PHYSICAL EXAM:    Constitutional: Semirecumbent in bed on vent, sedated, appears older than stated age  HEENT: ETT to vent  Pulmonary: Bilateral breath sounds with scattered rhonchi  Cardiovascular: Tachycardic, regular, diffuse edema  extremities: multiple ulcers        LABS: All Labs Reviewed:                        9.1    23.31 )-----------( 62       ( 30 Jun 2020 05:00 )             26.7                         9.0    24.77 )-----------( 44       ( 29 Jun 2020 06:00 )             26.0                         8.7    11.61 )-----------( 30       ( 28 Jun 2020 06:00 )             25.7     30 Jun 2020 05:00    130    |  94     |  72     ----------------------------<  84     3.5     |  26     |  2.25   29 Jun 2020 06:00    131    |  96     |  64     ----------------------------<  109    3.6     |  26     |  2.26   28 Jun 2020 06:00    134    |  99     |  56     ----------------------------<  107    3.8     |  27     |  2.32     Ca    7.0        30 Jun 2020 05:00  Ca    7.1        29 Jun 2020 06:00  Ca    6.7        28 Jun 2020 06:00  Phos  3.8       30 Jun 2020 05:00  Phos  3.2       28 Jun 2020 06:00  Phos  3.3       27 Jun 2020 22:20  Mg     2.0       30 Jun 2020 05:00  Mg     2.1       28 Jun 2020 06:00  Mg     1.8       27 Jun 2020 22:20    TPro  5.4    /  Alb  1.1    /  TBili  0.7    /  DBili  x      /  AST  74     /  ALT  25     /  AlkPhos  362    29 Jun 2020 06:00  TPro  4.8    /  Alb  0.9    /  TBili  0.7    /  DBili  x      /  AST  66     /  ALT  21     /  AlkPhos  275    28 Jun 2020 06:00  TPro  5.2    /  Alb  1.1    /  TBili  0.8    /  DBili  x      /  AST  78     /  ALT  25     /  AlkPhos  328    27 Jun 2020 22:20      12 Lead ECG (06.24.20 @ 10:51):  Normal sinus rhythm, Low voltage QRS    TTE Echo Complete w/o Contrast w/ Doppler (06.24.20 @ 14:01):     The left ventricle is normal in size, wall thickness, wall motion and contractility. Estimated left ventricular ejection fraction is 60 %.   The right atrium is normal in size.   A catheter is noted in the right atrium.   The aortic valve is trileaflet with thin pliable leaflets.   The mitral valve leaflets appear thickened.   Trace mitral regurgitation is present.   The tricuspid valve leaflets appear mildly thickened and/or calcified, but open well.   Trace tricuspid valve regurgitation is present.   Trace pulmonic valvular regurgitation is present.    US Abdomen Complete (06.25.20 @ 17:46):     Avascular and mildly echogenic soft tissue mass surrounding portal vein, biliary ducts, and RIGHT hepatic artery of indeterminate etiology. FOLLOW-UP CONTRAST CT SCAN RECOMMENDED.,  Status post cholecystectomy.Mild splenomegaly.Fatty infiltration of liver.    Xray Chest 1 View-PORTABLE IMMEDIATE (06.23.20 @ 22:54):  1. Right IJ central venous catheter.  2. Nonspecific vertical, linear density in the peripheral right upper lobe, likely confluence of shadows/artifact or on the patient.  3. Otherwise, no acute cardiopulmonary disease findings.

## 2020-06-30 NOTE — CHART NOTE - NSCHARTNOTEFT_GEN_A_CORE
Spoke with Aunt/surrogate Gregoria Lennyantoniacarlitos  [phone].  All concerns addressed including but not limited to diagnosis, treatment plan and overall prognosis.  She stressed again DNR and no heroic measures.  She also stated that she does not believe Phoenix Mane is her .

## 2020-06-30 NOTE — PROGRESS NOTE ADULT - SUBJECTIVE AND OBJECTIVE BOX
Hospital # 6  CCU # 3  Vent # 3  [facility] CVL # 6    CC:  Sepsis     HPI:    38 y/o female with lupus, RA, reynards, epilepsy (last seizure 8 yrs ago), IVDA (clean for 4 years), who presented to ED today due to weakness. Pt states that her grandmother  approx. 3 months ago and for the past almost 2 months she has been snorting two bags of heroin a day. Pt states her heroin use is partially due to depression and anxiety since gma passed away however also due to financial reasons as can no longer afford oxycodone which she had been taking for her lupus/RA chronic pain as Rx. Pt states she has wounds all over her body from falls, ulcers from previous attempted injection sites, which are not healing and bleed (approx 2 tbs (RUE wound)) daily  Pt Moved to CCU on  for acute resp failure requiring intubation    :  Pt endorsed to me by Dr dey.  Pt seen and examined in CCU--vented and sedated--PAP ---on phenyl gtt 0.3.  Multiple skin lesion--non healing ulcers and old track marks.  HIV negative.  COVID NEGATIVE.  Chest Chest-- Personally reviewed-- B/L GGO and consolidation R>L.  WBC 24.  Tm 101.7  Cr down 2.2  Chronically ill with Albumin 1  :  Tm 103.8  WBC 23.  Sputum negative so far.  on light sedation--still Encephalopathic.  On phenyl 0.4  Case d/w dr Diaz--Family refusing GRAHAM    PMH:  As above.     PSH:  As above.     FH: Non Contributory other than those listed in HPI    Social History:  Unobtainable due to clinical condition     MEDICATIONS  (STANDING):  chlorhexidine 0.12% Liquid 15 milliLiter(s) Oral Mucosa every 12 hours  collagenase Ointment 1 Application(s) Topical daily  dextrose 5%. 1000 milliLiter(s) (50 mL/Hr) IV Continuous <Continuous>  dextrose 50% Injectable 12.5 Gram(s) IV Push once  dextrose 50% Injectable 25 Gram(s) IV Push once  dextrose 50% Injectable 25 Gram(s) IV Push once  folic acid 1 milliGRAM(s) Oral daily  pantoprazole  Injectable 40 milliGRAM(s) IV Push daily  phenylephrine    Infusion 0.5 MICROgram(s)/kG/Min (10.2 mL/Hr) IV Continuous <Continuous>  piperacillin/tazobactam IVPB.. 3.375 Gram(s) IV Intermittent every 8 hours  propofol Infusion 10 MICROgram(s)/kG/Min (3.26 mL/Hr) IV Continuous <Continuous>  silver sulfADIAZINE 1% Cream 1 Application(s) Topical daily  thiamine 100 milliGRAM(s) Oral daily    MEDICATIONS  (PRN):  acetaminophen   Tablet .. 650 milliGRAM(s) Oral every 4 hours PRN Temp greater or equal to 38C (100.4F), Mild Pain (1 - 3)  dextrose 40% Gel 15 Gram(s) Oral once PRN Blood Glucose LESS THAN 70 milliGRAM(s)/deciLiter  glucagon  Injectable 1 milliGRAM(s) IntraMuscular once PRN Glucose <70 milliGRAM(s)/deciLiter      Allergies: NKDA    ROS:  SEE BELOW        ICU Vital Signs Last 24 Hrs  T(C): 37.1 (2020 05:00), Max: 39.9 (2020 15:38)  T(F): 98.8 (2020 05:00), Max: 103.8 (2020 15:38)  HR: 104 (2020 06:00) (85 - 137)  BP: 113/83 (2020 06:00) (99/68 - 123/85)  BP(mean): 89 (2020 06:00) (75 - 96)  ABP: --  ABP(mean): --  RR: --  SpO2: 100% (2020 06:00) (93% - 100%)      Mode: AC/ CMV (Assist Control/ Continuous Mandatory Ventilation)  RR (machine): 20  TV (machine): 400  FiO2: 40  PEEP: 5  ITime: 1  MAP: 12  PIP: 28      I&O's Summary    2020 07:01  -  2020 07:00  --------------------------------------------------------  IN: 1431 mL / OUT: 900 mL / NET: 531 mL        Physical Exam:  SEE BELOW                          9.1    23.31 )-----------( 62       ( 2020 05:00 )             26.7       06-30    130<L>  |  94<L>  |  72<H>  ----------------------------<  84  3.5   |  26  |  2.25<H>    Ca    7.0<L>      2020 05:00  Phos  3.8     06-30  Mg     2.0     30    TPro  5.4<L>  /  Alb  1.1<L>  /  TBili  0.7  /  DBili  x   /  AST  74<H>  /  ALT  25  /  AlkPhos  362<H>              ABG - ( 2020 05:19 )  pH, Arterial: 7.47  pH, Blood: x     /  pCO2: 35    /  pO2: 73    / HCO3: 25    / Base Excess: 1.5   /  SaO2: 95                      DVT Prophylaxis:                                                            Contraindication:     Advanced Directives:    Discussed with:    Visit Information:  Time spent excluding procedure:      ** Time is exclusive of billed procedures and/or teaching and/or routine family updates.

## 2020-06-30 NOTE — PROGRESS NOTE ADULT - SUBJECTIVE AND OBJECTIVE BOX
Patient is a 39y Female who remains intubated.         MEDICATIONS  (STANDING):  chlorhexidine 0.12% Liquid 15 milliLiter(s) Oral Mucosa every 12 hours  collagenase Ointment 1 Application(s) Topical daily  dextrose 5%. 1000 milliLiter(s) (50 mL/Hr) IV Continuous <Continuous>  dextrose 50% Injectable 12.5 Gram(s) IV Push once  dextrose 50% Injectable 25 Gram(s) IV Push once  dextrose 50% Injectable 25 Gram(s) IV Push once  folic acid 1 milliGRAM(s) Oral daily  pantoprazole  Injectable 40 milliGRAM(s) IV Push daily  phenylephrine    Infusion 0.5 MICROgram(s)/kG/Min (10.2 mL/Hr) IV Continuous <Continuous>  piperacillin/tazobactam IVPB.. 3.375 Gram(s) IV Intermittent every 8 hours  propofol Infusion 10 MICROgram(s)/kG/Min (3.26 mL/Hr) IV Continuous <Continuous>  silver sulfADIAZINE 1% Cream 1 Application(s) Topical daily  thiamine 100 milliGRAM(s) Oral daily    MEDICATIONS  (PRN):  acetaminophen   Tablet .. 650 milliGRAM(s) Oral every 4 hours PRN Temp greater or equal to 38C (100.4F), Mild Pain (1 - 3)  dextrose 40% Gel 15 Gram(s) Oral once PRN Blood Glucose LESS THAN 70 milliGRAM(s)/deciLiter  glucagon  Injectable 1 milliGRAM(s) IntraMuscular once PRN Glucose <70 milliGRAM(s)/deciLiter        T(C): , Max: 37.3 (06-30-20 @ 03:00)  T(F): , Max: 99.2 (06-30-20 @ 03:00)  HR: 118 (06-30-20 @ 18:00)  BP: 121/90 (06-30-20 @ 18:00)  BP(mean): 96 (06-30-20 @ 18:00)  RR: --  SpO2: 100% (06-30-20 @ 18:00)  Wt(kg): --    06-29 @ 07:01  -  06-30 @ 07:00  --------------------------------------------------------  IN: 1431 mL / OUT: 900 mL / NET: 531 mL    06-30 @ 07:01  -  06-30 @ 18:50  --------------------------------------------------------  IN: 638 mL / OUT: 350 mL / NET: 288 mL          PHYSICAL EXAM:    Constitutional:  int, >then stated age  HEENT: dry MMM  Neck: No LAD, No JVD  Respiratory: dist BS  Cardiovascular: S1 and S2  Extremities: chronic peripheral edema  Neurological: int  :  Naomi  Skin: diffuse arm/leg old scars  Access: Not applicable        LABS:                        9.1    23.31 )-----------( 62       ( 30 Jun 2020 05:00 )             26.7     30 Jun 2020 05:00    130    |  94     |  72     ----------------------------<  84     3.5     |  26     |  2.25   29 Jun 2020 06:00    131    |  96     |  64     ----------------------------<  109    3.6     |  26     |  2.26   28 Jun 2020 06:00    134    |  99     |  56     ----------------------------<  107    3.8     |  27     |  2.32   27 Jun 2020 22:20    137    |  102    |  57     ----------------------------<  97     4.5     |  26     |  2.39   27 Jun 2020 07:20    136    |  106    |  57     ----------------------------<  106    4.9     |  17     |  2.54     Ca    7.0        30 Jun 2020 05:00  Ca    7.1        29 Jun 2020 06:00  Ca    6.7        28 Jun 2020 06:00  Ca    7.2        27 Jun 2020 22:20  Ca    7.5        27 Jun 2020 07:20  Phos  3.8       30 Jun 2020 05:00  Phos  3.2       28 Jun 2020 06:00  Phos  3.3       27 Jun 2020 22:20  Mg     2.0       30 Jun 2020 05:00  Mg     2.1       28 Jun 2020 06:00  Mg     1.8       27 Jun 2020 22:20    TPro  5.4    /  Alb  1.1    /  TBili  0.7    /  DBili  x      /  AST  74     /  ALT  25     /  AlkPhos  362    29 Jun 2020 06:00  TPro  4.8    /  Alb  0.9    /  TBili  0.7    /  DBili  x      /  AST  66     /  ALT  21     /  AlkPhos  275    28 Jun 2020 06:00  TPro  5.2    /  Alb  1.1    /  TBili  0.8    /  DBili  x      /  AST  78     /  ALT  25     /  AlkPhos  328    27 Jun 2020 22:20          Urine Studies:          RADIOLOGY & ADDITIONAL STUDIES:

## 2020-06-30 NOTE — PROGRESS NOTE ADULT - PROBLEM SELECTOR PLAN 1
MRSA bacteremia - receiving IV antibiotics; Poor prognosis, currently DNR/DNI.   Discussed w/ ICU attending Dr. Rayo - family member does not want invasive procedures performed - no GRAHAM planned.

## 2020-06-30 NOTE — PROGRESS NOTE ADULT - SUBJECTIVE AND OBJECTIVE BOX
Date of service: 20 @ 10:07    Events noted  Intubated on ventilatory support  Reported with vancomycin level high  Febrile    ROS: unobtainable    MEDICATIONS  (STANDING):  chlorhexidine 0.12% Liquid 15 milliLiter(s) Oral Mucosa every 12 hours  collagenase Ointment 1 Application(s) Topical daily  dextrose 5%. 1000 milliLiter(s) (50 mL/Hr) IV Continuous <Continuous>  dextrose 50% Injectable 12.5 Gram(s) IV Push once  dextrose 50% Injectable 25 Gram(s) IV Push once  dextrose 50% Injectable 25 Gram(s) IV Push once  folic acid 1 milliGRAM(s) Oral daily  pantoprazole  Injectable 40 milliGRAM(s) IV Push daily  phenylephrine    Infusion 0.5 MICROgram(s)/kG/Min (10.2 mL/Hr) IV Continuous <Continuous>  piperacillin/tazobactam IVPB.. 3.375 Gram(s) IV Intermittent every 8 hours  propofol Infusion 10 MICROgram(s)/kG/Min (3.26 mL/Hr) IV Continuous <Continuous>  silver sulfADIAZINE 1% Cream 1 Application(s) Topical daily  thiamine 100 milliGRAM(s) Oral daily      Vital Signs Last 24 Hrs  T(C): 37.1 (2020 05:00), Max: 39.9 (2020 15:38)  T(F): 98.8 (2020 05:00), Max: 103.8 (2020 15:38)  HR: 105 (2020 08:41) (85 - 137)  BP: 113/83 (2020 06:00) (99/68 - 122/91)  BP(mean): 89 (2020 06:00) (75 - 96)  RR: --  SpO2: 100% (2020 08:41) (93% - 100%)    Mode: AC/ CMV (Assist Control/ Continuous Mandatory Ventilation), RR (machine): 20, TV (machine): 400, FiO2: 40, PEEP: 5, ITime: 1, MAP: 8.4, PIP: 17    Physical exam:    Constitutional:  No acute distress  HEENT: NC/AT, EOMI, PERRLA, conjunctivae clear  Neck: supple; thyroid not palpable  Back: no tenderness  Respiratory: respiratory effort normal; clear to auscultation  Cardiovascular: S1S2 regular, no murmurs  Abdomen: soft, not tender, not distended, positive BS  Genitourinary: no suprapubic tenderness  Lymphatic: no LN palpable  Musculoskeletal: no muscle tenderness, no joint swelling or tenderness; multiple contusions  Extremities: no pedal edema  Neurological/ Psychiatric: confused; moving all extremities  Skin: multiple skin ulcers and rashes; no drainage    Labs: reviewed                        9.1    23.31 )-----------( 62       ( 2020 05:00 )             26.7     06-30    130<L>  |  94<L>  |  72<H>  ----------------------------<  84  3.5   |  26  |  2.25<H>    Ca    7.0<L>      2020 05:00  Phos  3.8     06-30  Mg     2.0     30    TPro  5.4<L>  /  Alb  1.1<L>  /  TBili  0.7  /  DBili  x   /  AST  74<H>  /  ALT  25  /  AlkPhos  362<H>      Vancomycin Level, Trough: 29.8 ug/mL ( @ 05:00)                          7.4    8.05  )-----------( 100      ( 2020 07:05 )             21.9     06-24    134<L>  |  104  |  48<H>  ----------------------------<  68<L>  4.8   |  20<L>  |  2.31<H>    Ca    7.2<L>      2020 07:05  Phos  3.3     06-24  Mg     1.1     06-24    TPro  4.8<L>  /  Alb  0.8<L>  /  TBili  1.4<H>  /  DBili  x   /  AST  73<H>  /  ALT  27  /  AlkPhos  298<H>  24     LIVER FUNCTIONS - ( 2020 07:05 )  Alb: 0.8 g/dL / Pro: 4.8 gm/dL / ALK PHOS: 298 U/L / ALT: 27 U/L / AST: 73 U/L / GGT: x           Urinalysis Basic - ( 2020 05:41 )    Color: Ale / Appearance: Slightly Turbid / S.010 / pH: x  Gluc: x / Ketone: Negative  / Bili: Negative / Urobili: 1 mg/dL   Blood: x / Protein: 30 mg/dL / Nitrite: Negative   Leuk Esterase: Moderate / RBC: 11-25 /HPF / WBC 3-5   Sq Epi: x / Non Sq Epi: Negative / Bacteria: Many      Culture - Sputum (collected 2020 08:29)  Source: .Sputum Sputum trap  Gram Stain (2020 14:45):    No Squamous epithelial cells per low power field    Moderate polymorphonuclear leukocytes per low power field    Moderate Yeast per oil power field  Preliminary Report (2020 10:05):    Normal Respiratory Elsa present    Culture - Urine (collected 2020 05:41)  Source: .Urine None  Final Report (2020 10:38):    >100,000 CFU/ml Methicillin resistant Staphylococcus aureus    <10,000 CFU/ml Normal Urogenital elsa present  Organism: Methicillin resistant Staphylococcus aureus (2020 10:38)  Organism: Methicillin resistant Staphylococcus aureus (2020 10:38)      -  Ampicillin/Sulbactam: R <=8/4      -  Cefazolin: R 8      -  Daptomycin: S 1      -  Gentamicin: S <=1 Should not be used as monotherapy      -  Linezolid: S 2      -  Oxacillin: R >2      -  Penicillin: R >8      -  RIF- Rifampin: S <=1 Should not be used as monotherapy      -  Tetra/Doxy: S <=1      -  Trimethoprim/Sulfamethoxazole: S <=0.5/9.5      -  Vancomycin: S 2      Method Type: KRYSTEN    Culture - Blood (collected 2020 22:22)  Source: .Blood None  Gram Stain (2020 15:07):    Growth in aerobic bottle: Gram Positive Cocci in Clusters    Growth in anaerobic bottle: Gram Positive Cocci in Clusters  Final Report (2020 10:45):    Growth in aerobic and anaerobic bottles: Methicillin resistant    Staphylococcus aureus  Organism: Blood Culture PCR  Methicillin resistant Staphylococcus aureus (2020 10:45)  Organism: Methicillin resistant Staphylococcus aureus (2020 10:45)      -  Ampicillin/Sulbactam: R <=8/4      -  Cefazolin: R 8      -  Clindamycin: R >4      -  Daptomycin: S 1      -  Erythromycin: R >4      -  Gentamicin: S <=1 Should not be used as monotherapy      -  Linezolid: S 2      -  Oxacillin: R >2      -  Penicillin: R >8      -  RIF- Rifampin: S <=1 Should not be used as monotherapy      -  Tetra/Doxy: S <=1      -  Trimethoprim/Sulfamethoxazole: S <=0.5/9.5      -  Vancomycin: S 2      Method Type: KRYSTEN  Organism: Blood Culture PCR (2020 10:45)      -  Methicillin resistant Staphylococcus aureus (MRSA): Detec      Method Type: PCR    Culture - Blood (collected 2020 22:22)  Source: .Blood None  Gram Stain (2020 17:42):    Growth in aerobic bottle: Gram Positive Cocci in Clusters    Growth in anaerobic bottle: Gram Positive Cocci in Clusters  Final Report (2020 10:50):    Growth in aerobic and anaerobic bottles: Methicillin resistant    Staphylococcus aureus    See previous culture [id number]        Radiology: all available radiological tests reviewed    Cardiac Echo:  The left ventricle is normal in size, wall thickness, wall motion and   contractility.   Estimated left ventricular ejection fraction is 60 %.   The right atrium is normal in size.   A catheter is noted in the right atrium.   The aortic valve is trileaflet with thin pliable leaflets.   The mitral valve leaflets appear thickened.   Trace mitral regurgitation is present.   The tricuspid valve leaflets appear mildly thickened and/or calcified, but   open well.   Trace tricuspid valve regurgitation is present.   Trace pulmonic valvular regurgitation is present.    < from: US Abdomen Complete (20 @ 17:46) >  Avascular and mildly echogenic soft tissue mass surrounding portal vein, biliary ducts, and RIGHT hepatic artery of indeterminate etiology. FOLLOW-UP CONTRAST CT SCAN RECOMMENDED.,  Status post cholecystectomy.  Mild splenomegaly.  Fatty infiltration ofliver.    < end of copied text >      Advanced directives addressed: full resuscitation

## 2020-06-30 NOTE — PROGRESS NOTE ADULT - ASSESSMENT
40 y/o female with h/o SLE, RA, Reynauld syndrome, epilepsy (last seizure 8 yrs ago), IVDA (heroin) was admitted on  for increased weakness. Pt states that her grandmother  approx. 3 months ago and for the past almost 2 months she has been snorting two bags of heroin a day. Pt states her heroin use is partially due to depression and anxiety since gma passed away however also due to financial reasons as can no longer afford oxycodone which she had been taking for her lupus/RA chronic pain as Rx. Pt states she has wounds all over her body from falls, ulcers from previous attempted injection sites, which are not healing and bleed at times. Pt describes weakness as fatigue, endorses sob with exertion, weight loss due to decreased appetite, palpitations. Denies fever, cough. In ER she was found with low grade fever and received vancomycin IV and zosyn.     1. Sepsis with MRSA. Skin ulcers ?track marks. Possible SBE. Soft tissue portal vein/ liver mass ?cause Probable arms cellulitis. UTI with MRSA. Chronic renal failure. Acute respiratory failure.   -poor IV access: has TLC, but no peripheral lines can be obtained  -BC reviewed  -repeat BC could not be obtained so far  -on vancomycin # 5 and zosyn 1 gm IV qd # 2  -tolerating abx well so far; no side effects noted  -vancomycin level is high; renal function is stable  -repeat BC x 2  -hold vancomycin today and restart at 500 mg IV QD  -consider cardiology evaluation and GRAHAM  -continue IV abx coverage  -monitor temps  -f/u CBC  -supportive care  2. Other issues:   -care per medicine

## 2020-07-01 LAB
ANION GAP SERPL CALC-SCNC: 12 MMOL/L — SIGNIFICANT CHANGE UP (ref 5–17)
BUN SERPL-MCNC: 77 MG/DL — HIGH (ref 7–23)
CALCIUM SERPL-MCNC: 7.1 MG/DL — LOW (ref 8.5–10.1)
CHLORIDE SERPL-SCNC: 96 MMOL/L — SIGNIFICANT CHANGE UP (ref 96–108)
CO2 SERPL-SCNC: 25 MMOL/L — SIGNIFICANT CHANGE UP (ref 22–31)
CREAT SERPL-MCNC: 1.94 MG/DL — HIGH (ref 0.5–1.3)
CULTURE RESULTS: SIGNIFICANT CHANGE UP
GLUCOSE SERPL-MCNC: 89 MG/DL — SIGNIFICANT CHANGE UP (ref 70–99)
HCT VFR BLD CALC: 21.9 % — LOW (ref 34.5–45)
HGB BLD-MCNC: 7.5 G/DL — LOW (ref 11.5–15.5)
MAGNESIUM SERPL-MCNC: 2 MG/DL — SIGNIFICANT CHANGE UP (ref 1.6–2.6)
MCHC RBC-ENTMCNC: 29.4 PG — SIGNIFICANT CHANGE UP (ref 27–34)
MCHC RBC-ENTMCNC: 34.2 GM/DL — SIGNIFICANT CHANGE UP (ref 32–36)
MCV RBC AUTO: 85.9 FL — SIGNIFICANT CHANGE UP (ref 80–100)
PHOSPHATE SERPL-MCNC: 3.5 MG/DL — SIGNIFICANT CHANGE UP (ref 2.5–4.5)
PLATELET # BLD AUTO: 82 K/UL — LOW (ref 150–400)
POTASSIUM SERPL-MCNC: 2.8 MMOL/L — CRITICAL LOW (ref 3.5–5.3)
POTASSIUM SERPL-SCNC: 2.8 MMOL/L — CRITICAL LOW (ref 3.5–5.3)
RBC # BLD: 2.55 M/UL — LOW (ref 3.8–5.2)
RBC # FLD: 17.2 % — HIGH (ref 10.3–14.5)
SODIUM SERPL-SCNC: 133 MMOL/L — LOW (ref 135–145)
SPECIMEN SOURCE: SIGNIFICANT CHANGE UP
VANCOMYCIN FLD-MCNC: 22.2 UG/ML — HIGH (ref 10–20)
WBC # BLD: 16.73 K/UL — HIGH (ref 3.8–10.5)
WBC # FLD AUTO: 16.73 K/UL — HIGH (ref 3.8–10.5)

## 2020-07-01 PROCEDURE — 99291 CRITICAL CARE FIRST HOUR: CPT

## 2020-07-01 RX ORDER — POTASSIUM CHLORIDE 20 MEQ
40 PACKET (EA) ORAL ONCE
Refills: 0 | Status: COMPLETED | OUTPATIENT
Start: 2020-07-01 | End: 2020-07-01

## 2020-07-01 RX ORDER — MIDODRINE HYDROCHLORIDE 2.5 MG/1
10 TABLET ORAL EVERY 8 HOURS
Refills: 0 | Status: DISCONTINUED | OUTPATIENT
Start: 2020-07-01 | End: 2020-07-05

## 2020-07-01 RX ADMIN — OXYCODONE HYDROCHLORIDE 5 MILLIGRAM(S): 5 TABLET ORAL at 08:34

## 2020-07-01 RX ADMIN — CHLORHEXIDINE GLUCONATE 15 MILLILITER(S): 213 SOLUTION TOPICAL at 05:31

## 2020-07-01 RX ADMIN — PANTOPRAZOLE SODIUM 40 MILLIGRAM(S): 20 TABLET, DELAYED RELEASE ORAL at 12:49

## 2020-07-01 RX ADMIN — PIPERACILLIN AND TAZOBACTAM 25 GRAM(S): 4; .5 INJECTION, POWDER, LYOPHILIZED, FOR SOLUTION INTRAVENOUS at 21:50

## 2020-07-01 RX ADMIN — PIPERACILLIN AND TAZOBACTAM 25 GRAM(S): 4; .5 INJECTION, POWDER, LYOPHILIZED, FOR SOLUTION INTRAVENOUS at 05:30

## 2020-07-01 RX ADMIN — CHLORHEXIDINE GLUCONATE 15 MILLILITER(S): 213 SOLUTION TOPICAL at 17:44

## 2020-07-01 RX ADMIN — MIDODRINE HYDROCHLORIDE 10 MILLIGRAM(S): 2.5 TABLET ORAL at 15:20

## 2020-07-01 RX ADMIN — Medication 100 MILLIGRAM(S): at 12:49

## 2020-07-01 RX ADMIN — Medication 1 MILLIGRAM(S): at 12:50

## 2020-07-01 RX ADMIN — PIPERACILLIN AND TAZOBACTAM 25 GRAM(S): 4; .5 INJECTION, POWDER, LYOPHILIZED, FOR SOLUTION INTRAVENOUS at 15:20

## 2020-07-01 RX ADMIN — Medication 40 MILLIEQUIVALENT(S): at 09:44

## 2020-07-01 RX ADMIN — MIDODRINE HYDROCHLORIDE 10 MILLIGRAM(S): 2.5 TABLET ORAL at 21:50

## 2020-07-01 RX ADMIN — OXYCODONE HYDROCHLORIDE 5 MILLIGRAM(S): 5 TABLET ORAL at 02:10

## 2020-07-01 RX ADMIN — MIDODRINE HYDROCHLORIDE 10 MILLIGRAM(S): 2.5 TABLET ORAL at 09:44

## 2020-07-01 RX ADMIN — Medication 1 APPLICATION(S): at 11:31

## 2020-07-01 RX ADMIN — Medication 50 MILLIGRAM(S): at 05:30

## 2020-07-01 RX ADMIN — PROPOFOL 3.26 MICROGRAM(S)/KG/MIN: 10 INJECTION, EMULSION INTRAVENOUS at 05:30

## 2020-07-01 RX ADMIN — Medication 1 APPLICATION(S): at 11:30

## 2020-07-01 RX ADMIN — Medication 50 MILLIGRAM(S): at 00:34

## 2020-07-01 RX ADMIN — OXYCODONE HYDROCHLORIDE 5 MILLIGRAM(S): 5 TABLET ORAL at 21:50

## 2020-07-01 RX ADMIN — OXYCODONE HYDROCHLORIDE 5 MILLIGRAM(S): 5 TABLET ORAL at 15:20

## 2020-07-01 NOTE — PROGRESS NOTE ADULT - SUBJECTIVE AND OBJECTIVE BOX
Patient is a 39y Female who remains intubated.   ** pt seen earlier today     no new events    MEDICATIONS  (STANDING):  chlorhexidine 0.12% Liquid 15 milliLiter(s) Oral Mucosa every 12 hours  collagenase Ointment 1 Application(s) Topical daily  dextrose 5%. 1000 milliLiter(s) (50 mL/Hr) IV Continuous <Continuous>  dextrose 50% Injectable 12.5 Gram(s) IV Push once  dextrose 50% Injectable 25 Gram(s) IV Push once  dextrose 50% Injectable 25 Gram(s) IV Push once  folic acid 1 milliGRAM(s) Oral daily  midodrine 10 milliGRAM(s) Oral every 8 hours  oxyCODONE    IR 5 milliGRAM(s) Oral every 6 hours  pantoprazole  Injectable 40 milliGRAM(s) IV Push daily  phenylephrine    Infusion 0.5 MICROgram(s)/kG/Min (10.2 mL/Hr) IV Continuous <Continuous>  piperacillin/tazobactam IVPB.. 3.375 Gram(s) IV Intermittent every 8 hours  propofol Infusion 10 MICROgram(s)/kG/Min (3.26 mL/Hr) IV Continuous <Continuous>  silver sulfADIAZINE 1% Cream 1 Application(s) Topical daily  thiamine 100 milliGRAM(s) Oral daily  vancomycin  IVPB 500 milliGRAM(s) IV Intermittent daily    MEDICATIONS  (PRN):  acetaminophen   Tablet .. 650 milliGRAM(s) Oral every 4 hours PRN Temp greater or equal to 38C (100.4F), Mild Pain (1 - 3)  dextrose 40% Gel 15 Gram(s) Oral once PRN Blood Glucose LESS THAN 70 milliGRAM(s)/deciLiter  glucagon  Injectable 1 milliGRAM(s) IntraMuscular once PRN Glucose <70 milliGRAM(s)/deciLiter  hydrOXYzine hydrochloride 50 milliGRAM(s) Oral every 4 hours PRN Anxiety  loperamide 2 milliGRAM(s) Oral every 4 hours PRN Loose stool        Vital Signs Last 24 Hrs  T(C): 37.6 (01 Jul 2020 21:50), Max: 37.6 (01 Jul 2020 21:50)  T(F): 99.7 (01 Jul 2020 21:50), Max: 99.7 (01 Jul 2020 21:50)  HR: 120 (01 Jul 2020 23:15) (95 - 129)  BP: 127/85 (01 Jul 2020 23:00) (101/68 - 127/85)  BP(mean): 93 (01 Jul 2020 23:00) (75 - 94)  RR: --  SpO2: 100% (01 Jul 2020 23:15) (100% - 100%)    I&O's Detail    30 Jun 2020 07:01  -  01 Jul 2020 07:00  --------------------------------------------------------  IN:    Free Water: 100 mL    Nepro: 690 mL    phenylephrine   Infusion: 116 mL    propofol Infusion: 283 mL    Solution: 200 mL  Total IN: 1389 mL    OUT:    Indwelling Catheter - Urethral: 350 mL    Intermittent Catheterization - Urethral: 500 mL  Total OUT: 850 mL    Total NET: 539 mL      01 Jul 2020 07:01  -  01 Jul 2020 23:22  --------------------------------------------------------  IN:    Free Water: 100 mL    Nepro: 360 mL    propofol Infusion: 192 mL    Solution: 100 mL  Total IN: 752 mL    OUT:    Intermittent Catheterization - Urethral: 400 mL  Total OUT: 400 mL    Total NET: 352 mL        PHYSICAL EXAM:    Constitutional:  int, >then stated age  HEENT: dry MMM  Neck: No LAD, No JVD  Respiratory: dist BS  Cardiovascular: S1 and S2  Extremities: chronic peripheral edema  Neurological: int  :  Salgado  Skin: diffuse arm/leg old scars  Access: Not applicable        LABS:                                   7.5    16.73 )-----------( 82       ( 01 Jul 2020 06:55 )             21.9                         9.1    23.31 )-----------( 62       ( 30 Jun 2020 05:00 )             26.7         133    |  96     |  77     ----------------------------<  89        01 Jul 2020 06:55  2.8     |  25     |  1.94     130    |  94     |  72     ----------------------------<  84        30 Jun 2020 05:00  3.5     |  26     |  2.25     131    |  96     |  64     ----------------------------<  109       29 Jun 2020 06:00  3.6     |  26     |  2.26     Ca    7.1        01 Jul 2020 06:55  Ca    7.0        30 Jun 2020 05:00    Phos  3.5       01 Jul 2020 06:55  Phos  3.8       30 Jun 2020 05:00    Mg     2.0       01 Jul 2020 06:55  Mg     2.0       30 Jun 2020 05:00    TPro  5.4    /  Alb  1.1    /  TBili  0.7    /        29 Jun 2020 06:00  DBili  x      /  AST  74     /  ALT  25     /  AlkPhos  362      TPro  4.8    /  Alb  0.9    /  TBili  0.7    /        28 Jun 2020 06:00  DBili  x      /  AST  66     /  ALT  21     /  AlkPhos  275            Urine Studies:          RADIOLOGY & ADDITIONAL STUDIES:

## 2020-07-01 NOTE — PROGRESS NOTE ADULT - SUBJECTIVE AND OBJECTIVE BOX
[facility] #   CCU # 4  Vent # 4  [facility] CVL # 7    CC:  Sepsis     HPI:    38 y/o female with lupus, RA, reynards, epilepsy (last seizure 8 yrs ago), IVDA (clean for 4 years), who presented to ED today due to weakness. Pt states that her grandmother  approx. 3 months ago and for the past almost 2 months she has been snorting two bags of heroin a day. Pt states her heroin use is partially due to depression and anxiety since gma passed away however also due to financial reasons as can no longer afford oxycodone which she had been taking for her lupus/RA chronic pain as Rx. Pt states she has wounds all over her body from falls, ulcers from previous attempted injection sites, which are not healing and bleed (approx 2 tbs (RUE wound)) daily  Pt Moved to CCU on  for acute resp failure requiring intubation    :  Pt endorsed to me by Dr dey.  Pt seen and examined in CCU--vented and sedated--PAP ---on phenyl gtt 0.3.  Multiple skin lesion--non healing ulcers and old track marks.  HIV negative.  COVID NEGATIVE.  Chest Chest-- Personally reviewed-- B/L GGO and consolidation R>L.  WBC 24.  Tm 101.7  Cr down 2.2  Chronically ill with Albumin 1  :  Tm 103.8  WBC 23.  Sputum negative so far.  on light sedation--still Encephalopathic.  On phenyl 0.4  Case d/w dr Diaz--Family refusing GRAHAM  :  Vented--awake and follows simple commands--very weak overall.  Cr down.  WBC down.  SBT--RR 50s within few mins     PMH:  As above.     PSH:  As above.     FH: Non Contributory other than those listed in HPI    Social History:  Unobtainable due to clinical condition     MEDICATIONS  (STANDING):  chlorhexidine 0.12% Liquid 15 milliLiter(s) Oral Mucosa every 12 hours  collagenase Ointment 1 Application(s) Topical daily  dextrose 5%. 1000 milliLiter(s) (50 mL/Hr) IV Continuous <Continuous>  dextrose 50% Injectable 12.5 Gram(s) IV Push once  dextrose 50% Injectable 25 Gram(s) IV Push once  dextrose 50% Injectable 25 Gram(s) IV Push once  folic acid 1 milliGRAM(s) Oral daily  midodrine 10 milliGRAM(s) Oral every 8 hours  oxyCODONE    IR 5 milliGRAM(s) Oral every 6 hours  pantoprazole  Injectable 40 milliGRAM(s) IV Push daily  phenylephrine    Infusion 0.5 MICROgram(s)/kG/Min (10.2 mL/Hr) IV Continuous <Continuous>  piperacillin/tazobactam IVPB.. 3.375 Gram(s) IV Intermittent every 8 hours  potassium chloride   Powder 40 milliEquivalent(s) Oral once  propofol Infusion 10 MICROgram(s)/kG/Min (3.26 mL/Hr) IV Continuous <Continuous>  silver sulfADIAZINE 1% Cream 1 Application(s) Topical daily  thiamine 100 milliGRAM(s) Oral daily  vancomycin  IVPB 500 milliGRAM(s) IV Intermittent daily    MEDICATIONS  (PRN):  acetaminophen   Tablet .. 650 milliGRAM(s) Oral every 4 hours PRN Temp greater or equal to 38C (100.4F), Mild Pain (1 - 3)  dextrose 40% Gel 15 Gram(s) Oral once PRN Blood Glucose LESS THAN 70 milliGRAM(s)/deciLiter  glucagon  Injectable 1 milliGRAM(s) IntraMuscular once PRN Glucose <70 milliGRAM(s)/deciLiter  hydrOXYzine hydrochloride 50 milliGRAM(s) Oral every 4 hours PRN Anxiety  loperamide 2 milliGRAM(s) Oral every 4 hours PRN Loose stool      Allergies: NKDA    ROS:  SEE BELOW        ICU Vital Signs Last 24 Hrs  T(C): 37 (2020 03:00), Max: 37.6 (2020 21:25)  T(F): 98.6 (2020 03:00), Max: 99.6 (2020 21:25)  HR: 115 (2020 07:00) (98 - 122)  BP: 112/76 (2020 07:00) (110/77 - 149/97)  BP(mean): 84 (2020 07:00) (84 - 105)  ABP: --  ABP(mean): --  RR: --  SpO2: 100% (2020 07:00) (100% - 100%)      Mode: AC/ CMV (Assist Control/ Continuous Mandatory Ventilation)  RR (machine): 20  TV (machine): 400  FiO2: 40  PEEP: 5  ITime: 1  MAP: 13  PIP: 23      I&O's Summary    2020 07:01  -  2020 07:00  --------------------------------------------------------  IN: 1389 mL / OUT: 850 mL / NET: 539 mL        Physical Exam:  SEE BELOW                          7.5    16.73 )-----------( 82       ( 2020 06:55 )             21.9       07-    133<L>  |  96  |  77<H>  ----------------------------<  89  2.8<LL>   |  25  |  1.94<H>    Ca    7.1<L>      2020 06:55  Phos  3.5     07-  Mg     2.0     07-                      DVT Prophylaxis:                                                            Contraindication:     Advanced Directives:    Discussed with:    Visit Information:  Time spent excluding procedure:  45 cc mins    ** Time is exclusive of billed procedures and/or teaching and/or routine family updates.

## 2020-07-01 NOTE — PROGRESS NOTE ADULT - ASSESSMENT
IMP:    38 y/o female with lupus, RA, reynards, epilepsy (last seizure 8 yrs ago) and IVDA admitted with MRSA sepsis and septic shock--likely DIC and prob embolic phenomenal to /renal system  Thrombocytopenia   Prob NATALIA--unknown baseline Cr.  No indication for emergent RRT   Acute type 1 resp failure with criteria for Mod ARDS (P/F ratio 180)  TM Encephalopathy--critical illness neuropathy   Chronically ill given Albumin around 1    High risk for acute decompensation and deterioration including death.  Critically ill  DNR as d/w dr dey and confirmed by Gregoria to myself over the phone    Plan:    Full vent support--LPV strategy to maintain plateau pressures < 30--High PEEP/low TV--Permissive hypercapnea and acidemia--SBT as tolerated  Repeat CXR in AM  Cont with IV vanco (6) and Pip/charisse (5)  Actively titrate pressors to MAP > 60--Add midodrine today   Wean sedation down for neuro eval  HOB > 30  TF as per protocol  Keep CVL in   DVT prophy--LLE SCD--no chemical given low plats    CCU care-- d/w ICU staff on multi disciplinary rounds

## 2020-07-01 NOTE — PROGRESS NOTE ADULT - ASSESSMENT
38 yo female with hx of SLE, RA, ( formerly on MTX up to year ago - followed by Dr Del Rosario) , hx of IVDA and now heroin use  now presengint with weakness and found to be MRSA bacteremia and renal eval called for elevated creatinine    NATALIA vs NATALIA on CKD   Cr stable   - IV abx per ID/ cardio eval to r/o SBE    - keep SBP > 110 for renal perfusion as able    - oral protein supplementation    - no ACE or ARB    - repleted K per ICUu     No acute indicaiton for HD, per previous discussion family would not want to pursue this     MRSA bacteremia with septic shock now hypoxic resp failure w possible ARDS on pressors    IV abx /ID    ICU level of care    Cardiolgy fup regarding GRAHAM /endcarditis risk      d/w ICU RN

## 2020-07-02 LAB
ANION GAP SERPL CALC-SCNC: 10 MMOL/L — SIGNIFICANT CHANGE UP (ref 5–17)
BUN SERPL-MCNC: 81 MG/DL — HIGH (ref 7–23)
CALCIUM SERPL-MCNC: 7.3 MG/DL — LOW (ref 8.5–10.1)
CHLORIDE SERPL-SCNC: 101 MMOL/L — SIGNIFICANT CHANGE UP (ref 96–108)
CO2 SERPL-SCNC: 25 MMOL/L — SIGNIFICANT CHANGE UP (ref 22–31)
CREAT SERPL-MCNC: 1.66 MG/DL — HIGH (ref 0.5–1.3)
GLUCOSE SERPL-MCNC: 107 MG/DL — HIGH (ref 70–99)
HCT VFR BLD CALC: 17.6 % — CRITICAL LOW (ref 34.5–45)
HGB BLD-MCNC: 6 G/DL — CRITICAL LOW (ref 11.5–15.5)
MAGNESIUM SERPL-MCNC: 2 MG/DL — SIGNIFICANT CHANGE UP (ref 1.6–2.6)
MCHC RBC-ENTMCNC: 30 PG — SIGNIFICANT CHANGE UP (ref 27–34)
MCHC RBC-ENTMCNC: 34.1 GM/DL — SIGNIFICANT CHANGE UP (ref 32–36)
MCV RBC AUTO: 88 FL — SIGNIFICANT CHANGE UP (ref 80–100)
PHOSPHATE SERPL-MCNC: 3.9 MG/DL — SIGNIFICANT CHANGE UP (ref 2.5–4.5)
PLATELET # BLD AUTO: 172 K/UL — SIGNIFICANT CHANGE UP (ref 150–400)
POTASSIUM SERPL-MCNC: 3 MMOL/L — LOW (ref 3.5–5.3)
POTASSIUM SERPL-SCNC: 3 MMOL/L — LOW (ref 3.5–5.3)
RBC # BLD: 2 M/UL — LOW (ref 3.8–5.2)
RBC # FLD: 17.2 % — HIGH (ref 10.3–14.5)
SODIUM SERPL-SCNC: 136 MMOL/L — SIGNIFICANT CHANGE UP (ref 135–145)
WBC # BLD: 22.27 K/UL — HIGH (ref 3.8–10.5)
WBC # FLD AUTO: 22.27 K/UL — HIGH (ref 3.8–10.5)

## 2020-07-02 PROCEDURE — 71045 X-RAY EXAM CHEST 1 VIEW: CPT | Mod: 26

## 2020-07-02 PROCEDURE — 99291 CRITICAL CARE FIRST HOUR: CPT

## 2020-07-02 RX ORDER — POTASSIUM CHLORIDE 20 MEQ
40 PACKET (EA) ORAL ONCE
Refills: 0 | Status: COMPLETED | OUTPATIENT
Start: 2020-07-02 | End: 2020-07-02

## 2020-07-02 RX ORDER — POTASSIUM CHLORIDE 20 MEQ
10 PACKET (EA) ORAL
Refills: 0 | Status: COMPLETED | OUTPATIENT
Start: 2020-07-02 | End: 2020-07-02

## 2020-07-02 RX ADMIN — OXYCODONE HYDROCHLORIDE 5 MILLIGRAM(S): 5 TABLET ORAL at 09:11

## 2020-07-02 RX ADMIN — Medication 100 MILLIGRAM(S): at 09:11

## 2020-07-02 RX ADMIN — PROPOFOL 3.26 MICROGRAM(S)/KG/MIN: 10 INJECTION, EMULSION INTRAVENOUS at 02:17

## 2020-07-02 RX ADMIN — CHLORHEXIDINE GLUCONATE 15 MILLILITER(S): 213 SOLUTION TOPICAL at 05:50

## 2020-07-02 RX ADMIN — PROPOFOL 3.26 MICROGRAM(S)/KG/MIN: 10 INJECTION, EMULSION INTRAVENOUS at 20:37

## 2020-07-02 RX ADMIN — PROPOFOL 3.26 MICROGRAM(S)/KG/MIN: 10 INJECTION, EMULSION INTRAVENOUS at 05:50

## 2020-07-02 RX ADMIN — PANTOPRAZOLE SODIUM 40 MILLIGRAM(S): 20 TABLET, DELAYED RELEASE ORAL at 09:12

## 2020-07-02 RX ADMIN — PIPERACILLIN AND TAZOBACTAM 25 GRAM(S): 4; .5 INJECTION, POWDER, LYOPHILIZED, FOR SOLUTION INTRAVENOUS at 22:32

## 2020-07-02 RX ADMIN — PROPOFOL 3.26 MICROGRAM(S)/KG/MIN: 10 INJECTION, EMULSION INTRAVENOUS at 16:11

## 2020-07-02 RX ADMIN — Medication 40 MILLIEQUIVALENT(S): at 12:15

## 2020-07-02 RX ADMIN — CHLORHEXIDINE GLUCONATE 15 MILLILITER(S): 213 SOLUTION TOPICAL at 19:18

## 2020-07-02 RX ADMIN — OXYCODONE HYDROCHLORIDE 5 MILLIGRAM(S): 5 TABLET ORAL at 14:10

## 2020-07-02 RX ADMIN — Medication 100 MILLIEQUIVALENT(S): at 14:09

## 2020-07-02 RX ADMIN — MIDODRINE HYDROCHLORIDE 10 MILLIGRAM(S): 2.5 TABLET ORAL at 22:32

## 2020-07-02 RX ADMIN — Medication 1 APPLICATION(S): at 12:12

## 2020-07-02 RX ADMIN — OXYCODONE HYDROCHLORIDE 5 MILLIGRAM(S): 5 TABLET ORAL at 02:16

## 2020-07-02 RX ADMIN — Medication 1 APPLICATION(S): at 12:11

## 2020-07-02 RX ADMIN — Medication 650 MILLIGRAM(S): at 02:17

## 2020-07-02 RX ADMIN — MIDODRINE HYDROCHLORIDE 10 MILLIGRAM(S): 2.5 TABLET ORAL at 14:09

## 2020-07-02 RX ADMIN — Medication 100 MILLIEQUIVALENT(S): at 12:14

## 2020-07-02 RX ADMIN — Medication 100 MILLIGRAM(S): at 05:50

## 2020-07-02 RX ADMIN — PIPERACILLIN AND TAZOBACTAM 25 GRAM(S): 4; .5 INJECTION, POWDER, LYOPHILIZED, FOR SOLUTION INTRAVENOUS at 05:50

## 2020-07-02 RX ADMIN — Medication 650 MILLIGRAM(S): at 19:55

## 2020-07-02 RX ADMIN — PIPERACILLIN AND TAZOBACTAM 25 GRAM(S): 4; .5 INJECTION, POWDER, LYOPHILIZED, FOR SOLUTION INTRAVENOUS at 14:09

## 2020-07-02 RX ADMIN — Medication 1 MILLIGRAM(S): at 09:11

## 2020-07-02 RX ADMIN — OXYCODONE HYDROCHLORIDE 5 MILLIGRAM(S): 5 TABLET ORAL at 19:55

## 2020-07-02 RX ADMIN — MIDODRINE HYDROCHLORIDE 10 MILLIGRAM(S): 2.5 TABLET ORAL at 05:50

## 2020-07-02 NOTE — CHART NOTE - NSCHARTNOTEFT_GEN_A_CORE
Hb 6.  Off pressors.  No active bleeding noted.  Clinically no indication for PRBC tx.  Will repeat CBC in AM and tx if needed.

## 2020-07-02 NOTE — CHART NOTE - NSCHARTNOTEFT_GEN_A_CORE
Pt with capacity to make own decision--witnessed by RNs Erika and Marline.  She wants full resuscitation and selected Phoenix Mane to be her HCP.    New MOLST form completed and placed in chart.    HCP form filled out, await for Phoenix akins

## 2020-07-02 NOTE — CHART NOTE - NSCHARTNOTEFT_GEN_A_CORE
Spoke with Phoenix Mane via phone--[phone].  He indicated that they got  in [place] and no legal separation was ever filed.  He currently lives in [place] but keeps in touch frequently with Reina.  It was difficult for him to be around while she was abusing opioids and drugs so he left [place].  He still cares about her and is very concerned her health.  He will try to produce a marriage certificate.       I did mention in case she needs trach and PEG, would he be agreeable to that.  He will let me know

## 2020-07-02 NOTE — PROGRESS NOTE ADULT - ASSESSMENT
IMP:    38 y/o female with lupus, RA, reynards, epilepsy (last seizure 8 yrs ago) and IVDA admitted with MRSA sepsis and septic shock--likely DIC and prob embolic phenomenal to /renal system  Thrombocytopenia   Prob NATALIA--unknown baseline Cr.  No indication for emergent RRT   Acute type 1 resp failure with criteria for Mod ARDS   TM Encephalopathy--critical illness neuropathy   Chronically ill given Albumin around 1    High risk for acute decompensation and deterioration including death.  Critically ill  DNR as d/w dr dey and confirmed by Gregoria to myself over the phone    Plan:    Full vent support--LPV strategy to maintain plateau pressures < 30--High PEEP/low TV--Permissive hypercapnea and acidemia--SBT as tolerated--not candidate for safe extubation given neuropathy   Follow up CXR once taken today  Cont with IV vanco (7) and Pip/charisse (6)  Actively titrate pressors to MAP > 60--Off now--Added midodrine   Wean sedation down for neuro eval  HOB > 30  TF as per protocol  Keep CVL in   DVT prophy--LLE SCD--no chemical given low plats    CCU care-- d/w ICU staff on multi disciplinary rounds

## 2020-07-02 NOTE — CONSULT NOTE ADULT - ASSESSMENT
Summary of ethical issues considered in the case and ethical analysis thereof    As to the question of decision-making for this patient:    1.  The patient is in the process of regaining sufficient decisional capacity to understand questions and express preferences.  To this extent, the patient should be consulted directly for her own healthcare decisions.  2.  Should her medical condition result in another loss of decisional capacity, it would be desirable to have clearly identified her Agent.  As soon as is feasible, therefore, a [facility] Healthcare Proxy should be completed.  3.  In the absence of a Healthcare Proxy, the patient's  would have priority as Surrogate under [facility] and the 2010 Family Healthcare Decisions Act.  4.  In the absence of certainty about who is the authorized Agent, as a general matter, the bias should be toward treatment and prolongation of life.  In the present case, additionally, the patient has expressed her preferences.  There are no claims that the patient's prior declared wishes would be counter to the continued application of life-extending interventions at this time (ventilation, dialysis, etc).     Ethical analysis.    The issues surrounding capacity and decision making by Agents or Surrogates proceed from considerations of patient Autonomy, whether expressed directly by the capacitated patient or indirectly by an authorized representative who is acting on the basis of the patient's prior declared wishes OR a general knowledge of the patient's values OR the best interest of the patient as understood by the representative.   Beneficence and non-maleficence as guiding values compel us to be diligent in ensuring that the patient's representative is being faithful, as best we can tell, to the valid grounds for decision-making outlined herein.  It is important to consider that in the absence of clarity, decisions to continue life extending treatment are reversible, while the opposite decisions are frequently not.      Recommendations:  A. As per items 1 - 4 above.  B. The MOLST should be voided (page 3) and a new form created.  As discussed telephonically, the DNR order should be cancelled.    Thank you for the courtesy of this consultation.  Ara Meyer RN; Marilyn Jones RN; Ángel Vasques MD for the Ethics Consultation Service

## 2020-07-02 NOTE — PROVIDER CONTACT NOTE (CRITICAL VALUE NOTIFICATION) - ACTION/TREATMENT ORDERED:
Provider aware of H&H, pending transfusion orders.
IV NS @ 100ml/hr .
replaced
will review, may order bolus

## 2020-07-02 NOTE — PROGRESS NOTE ADULT - SUBJECTIVE AND OBJECTIVE BOX
Patient is a 39y Female who remains intubated.     arousable on vent  eyes open     no new events    MEDICATIONS  (STANDING):  chlorhexidine 0.12% Liquid 15 milliLiter(s) Oral Mucosa every 12 hours  collagenase Ointment 1 Application(s) Topical daily  dextrose 5%. 1000 milliLiter(s) (50 mL/Hr) IV Continuous <Continuous>  dextrose 50% Injectable 12.5 Gram(s) IV Push once  dextrose 50% Injectable 25 Gram(s) IV Push once  dextrose 50% Injectable 25 Gram(s) IV Push once  folic acid 1 milliGRAM(s) Oral daily  midodrine 10 milliGRAM(s) Oral every 8 hours  oxyCODONE    IR 5 milliGRAM(s) Oral every 6 hours  pantoprazole  Injectable 40 milliGRAM(s) IV Push daily  phenylephrine    Infusion 0.5 MICROgram(s)/kG/Min (10.2 mL/Hr) IV Continuous <Continuous>  piperacillin/tazobactam IVPB.. 3.375 Gram(s) IV Intermittent every 8 hours  potassium chloride   Powder 40 milliEquivalent(s) Enteral Tube once  potassium chloride  10 mEq/100 mL IVPB 10 milliEquivalent(s) IV Intermittent every 1 hour  propofol Infusion 10 MICROgram(s)/kG/Min (3.26 mL/Hr) IV Continuous <Continuous>  silver sulfADIAZINE 1% Cream 1 Application(s) Topical daily  thiamine 100 milliGRAM(s) Oral daily  vancomycin  IVPB 500 milliGRAM(s) IV Intermittent daily    MEDICATIONS  (PRN):  acetaminophen   Tablet .. 650 milliGRAM(s) Oral every 4 hours PRN Temp greater or equal to 38C (100.4F), Mild Pain (1 - 3)  dextrose 40% Gel 15 Gram(s) Oral once PRN Blood Glucose LESS THAN 70 milliGRAM(s)/deciLiter  glucagon  Injectable 1 milliGRAM(s) IntraMuscular once PRN Glucose <70 milliGRAM(s)/deciLiter  hydrOXYzine hydrochloride 50 milliGRAM(s) Oral every 4 hours PRN Anxiety  loperamide 2 milliGRAM(s) Oral every 4 hours PRN Loose stool      Vital Signs Last 24 Hrs  T(C): 36.6 (02 Jul 2020 09:23), Max: 39.1 (02 Jul 2020 02:15)  T(F): 97.9 (02 Jul 2020 09:23), Max: 102.4 (02 Jul 2020 02:15)  HR: 101 (02 Jul 2020 11:00) (72 - 139)  BP: 121/80 (02 Jul 2020 11:00) (101/68 - 133/78)  BP(mean): 89 (02 Jul 2020 11:00) (63 - 94)  RR: --  SpO2: 100% (02 Jul 2020 11:00) (100% - 100%)    I&O's Detail    01 Jul 2020 07:01  -  02 Jul 2020 07:00  --------------------------------------------------------  IN:    Free Water: 225 mL    Nepro: 720 mL    propofol Infusion: 402 mL    Solution: 100 mL    Solution: 300 mL  Total IN: 1747 mL    OUT:    Intermittent Catheterization - Urethral: 400 mL  Total OUT: 400 mL    Total NET: 1347 mL        PHYSICAL EXAM:    Constitutional:  int, >then stated age  HEENT: dry MMM  Neck: No LAD, No JVD  Respiratory: dist BS  Cardiovascular: S1 and S2  Extremities: chronic peripheral edema  Neurological: int  :  Salgado  Skin: diffuse arm/leg old scars  Access: Not applicable        LABS:                                              6.0    22.27 )-----------( 172      ( 02 Jul 2020 10:25 )             17.6                         7.5    16.73 )-----------( 82       ( 01 Jul 2020 06:55 )             21.9         136    |  101    |  81     ----------------------------<  107       02 Jul 2020 10:25  3.0     |  25     |  1.66     133    |  96     |  77     ----------------------------<  89        01 Jul 2020 06:55  2.8     |  25     |  1.94     130    |  94     |  72     ----------------------------<  84        30 Jun 2020 05:00  3.5     |  26     |  2.25     Ca    7.3        02 Jul 2020 10:25  Ca    7.1        01 Jul 2020 06:55    Phos  3.9       02 Jul 2020 10:25  Phos  3.5       01 Jul 2020 06:55    Mg     2.0       02 Jul 2020 10:25  Mg     2.0       01 Jul 2020 06:55    TPro  5.4    /  Alb  1.1    /  TBili  0.7    / 29 Jun 2020 06:00  DBili  x      /  AST  74     /  ALT  25     /  AlkPhos  362          Urine Studies:        RADIOLOGY & ADDITIONAL STUDIES:

## 2020-07-02 NOTE — PROGRESS NOTE ADULT - ASSESSMENT
38 yo female with hx of SLE, RA, ( formerly on MTX up to year ago - followed by Dr Del Rosario) , hx of IVDA and now heroin use  now presengint with weakness and found to be MRSA bacteremia and renal eval called for elevated creatinine    NATALIA vs NATALIA on CKD   Cr stable    -  IV abx per ID/ cardio eval to r/o SBE    -  keep SBP > 110 for renal perfusion as able    -  oral protein supplementation    -  no ACE or ARB    -  repleted K today     No acute indicaiton for HD, per previous discussion family would not want to pursue this     MRSA bacteremia with septic shock now hypoxic resp failure w possible ARDS on pressors    IV abx /ID    Vent per ICU level of care    Cardiolgy fup regarding GRAHAM /endcarditis risk      d/w ICU RN

## 2020-07-02 NOTE — CONSULT NOTE ADULT - SUBJECTIVE AND OBJECTIVE BOX
Summary of the processes utilized in the course of the consultation, including but not limited to one or more of the following: seeing and/or talking with the patient, talking with and/or meeting with specific parties to the case, mediating discussions among stakeholders and any other process elements utilized.    The Ethics consultants spoke with: Dr. Nicho Rayo, Critical Care, Valentina Disla RN, NM, Marline Lagunas RN, and Fern AREVALO.  Eula Juarez ([facility] Legal Department) was consulted telephonically.      SUMMARY:  Asked to see this 39 year old female currently intubated in the CCU for hypoxemic respiratory failure secondary to MRSA sepsis.  She had presented initially to the ED with a chief complaint of weakness.  The medical history is notable for SLE, RA, Raynaud syndrome, epilepsy (last seizure 8 yrs ago) and Substance use disorder with intravenous opiate use.  Since consultation was requested, the patient has improved somewhat and is weaning from the ventilator.      Ethics consultation was sought to clarify who is the authorized decision-maker for the patient, given at least intermittent interruption to her decisional capacity secondary to medication and residual metabolic encephalopathy.  There is an aunt who has been actively involved during this admission, and signed a MOLST with a DNR order during this admission.  The sane individual has stated opposition to dialysis if this should be necessary (it is not, at present).  There are no other restrictions on life extending treatment at this time. Potential future decisions could include at least tracheostomy and PEG insertion if the patient's respiratory status does not continue to improve.  There is also an individual ("Phoenix Mane") who claims to be the patient's .  The aunt does not believe that they are actually .  Mr. Mane has asserted his desire that there be no restrictions on life-extending treatments.      Marline Lagunas RN spoke with patient earlier today. She was alert and able to nod her answers. She asked the patient if she knew her  was here and Reina nodded yes; she asked if she was still  to her  and Reina nodded yes; she asked her if she had a preference as to who her proxy would be and Reina shrugged. Marline then asked her if her heart should stop "do you want us to resuscitate you" and Reina shook her head yes. Marline asked her again so if your heart stops you want us to do CPR and Reina shook her head yes. Summary of the processes utilized in the course of the consultation, including but not limited to one or more of the following: seeing and/or talking with the patient, talking with and/or meeting with specific parties to the case, mediating discussions among stakeholders and any other process elements utilized.    The Ethics consultants spoke with: Dr. Nicho Rayo, Critical Care, Valentina Disla RN, NM, Marline Lagunas RN, and Fern AREVALO.  Eula Juarez ([facility] Legal Department) was consulted telephonically.      SUMMARY:  Asked to see this 39 year old female currently intubated in the CCU for hypoxemic respiratory failure secondary to MRSA sepsis.  She had presented initially to the ED with a chief complaint of weakness.  The medical history is notable for SLE, RA, Raynaud syndrome, epilepsy (last seizure 8 yrs ago) and Substance use disorder with intravenous opiate use.  Since consultation was requested, the patient has improved somewhat and is weaning from the ventilator.      Ethics consultation was sought to clarify who is the authorized decision-maker for the patient, given at least intermittent interruption to her decisional capacity secondary to medication and residual metabolic encephalopathy.  There is an aunt who has been actively involved during this admission, and signed a MOLST with a DNR order during this admission.  The same individual has stated opposition to dialysis if this should be necessary (it is not, at present).  There are no other restrictions on life extending treatment at this time. Potential future decisions could include at least tracheostomy and PEG insertion if the patient's respiratory status does not continue to improve.  There is also an individual ("Phoenix Mane") who claims to be the patient's .  The aunt does not believe that they are actually .  Mr. Mane has asserted his desire that there be no restrictions on life-extending treatments.      Marline Lagunas RN spoke with patient earlier today. She was alert and able to nod her answers. She asked the patient if she knew her  was here and Reina nodded yes; she asked if she was still  to her  and Reina nodded yes; she asked her if she had a preference as to who her proxy would be and Reina shrugged. Marline then asked her if her heart should stop "do you want us to resuscitate you" and Reina shook her head yes. Marline asked her again so if your heart stops you want us to do CPR and Reina shook her head yes.

## 2020-07-02 NOTE — PROGRESS NOTE ADULT - SUBJECTIVE AND OBJECTIVE BOX
[facility] # 8  CCU # 5  Vent # 5  [facility] CVL # 8    CC:  Sepsis     HPI:    38 y/o female with lupus, RA, reynards, epilepsy (last seizure 8 yrs ago), IVDA (clean for 4 years), who presented to ED today due to weakness. Pt states that her grandmother  approx. 3 months ago and for the past almost 2 months she has been snorting two bags of heroin a day. Pt states her heroin use is partially due to depression and anxiety since gma passed away however also due to financial reasons as can no longer afford oxycodone which she had been taking for her lupus/RA chronic pain as Rx. Pt states she has wounds all over her body from falls, ulcers from previous attempted injection sites, which are not healing and bleed (approx 2 tbs (RUE wound)) daily  Pt Moved to CCU on  for acute resp failure requiring intubation    :  Pt endorsed to me by Dr dey.  Pt seen and examined in CCU--vented and sedated--PAP --on phenyl gtt 0.3.  Multiple skin lesion--non healing ulcers and old track marks.  HIV negative.  COVID NEGATIVE.  Chest Chest-- Personally reviewed-- B/L GGO and consolidation R>L.  WBC 24.  Tm 101.7  Cr down 2.2  Chronically ill with Albumin 1  :  Tm 103.8  WBC 23.  Sputum negative so far.  on light sedation--still Encephalopathic.  On phenyl 0.4  Case d/w dr Diaz--Family refusing GRAHAM  :  Vented--awake and follows simple commands--very weak overall.  Cr down.  WBC down.  SBT--RR 50s within few mins   :  Tm 102  On SBT.  Very weak.  Eyes opened and engaging.      PMH:  As above.     PSH:  As above.     FH: Non Contributory other than those listed in HPI    Social History:  Unobtainable due to clinical condition     MEDICATIONS  (STANDING):  chlorhexidine 0.12% Liquid 15 milliLiter(s) Oral Mucosa every 12 hours  collagenase Ointment 1 Application(s) Topical daily  dextrose 5%. 1000 milliLiter(s) (50 mL/Hr) IV Continuous <Continuous>  dextrose 50% Injectable 12.5 Gram(s) IV Push once  dextrose 50% Injectable 25 Gram(s) IV Push once  dextrose 50% Injectable 25 Gram(s) IV Push once  folic acid 1 milliGRAM(s) Oral daily  midodrine 10 milliGRAM(s) Oral every 8 hours  oxyCODONE    IR 5 milliGRAM(s) Oral every 6 hours  pantoprazole  Injectable 40 milliGRAM(s) IV Push daily  phenylephrine    Infusion 0.5 MICROgram(s)/kG/Min (10.2 mL/Hr) IV Continuous <Continuous>  piperacillin/tazobactam IVPB.. 3.375 Gram(s) IV Intermittent every 8 hours  propofol Infusion 10 MICROgram(s)/kG/Min (3.26 mL/Hr) IV Continuous <Continuous>  silver sulfADIAZINE 1% Cream 1 Application(s) Topical daily  thiamine 100 milliGRAM(s) Oral daily  vancomycin  IVPB 500 milliGRAM(s) IV Intermittent daily    MEDICATIONS  (PRN):  acetaminophen   Tablet .. 650 milliGRAM(s) Oral every 4 hours PRN Temp greater or equal to 38C (100.4F), Mild Pain (1 - 3)  dextrose 40% Gel 15 Gram(s) Oral once PRN Blood Glucose LESS THAN 70 milliGRAM(s)/deciLiter  glucagon  Injectable 1 milliGRAM(s) IntraMuscular once PRN Glucose <70 milliGRAM(s)/deciLiter  hydrOXYzine hydrochloride 50 milliGRAM(s) Oral every 4 hours PRN Anxiety  loperamide 2 milliGRAM(s) Oral every 4 hours PRN Loose stool      Allergies: NKDA    ROS:  SEE BELOW        ICU Vital Signs Last 24 Hrs  T(C): 36.1 (2020 06:00), Max: 39.1 (2020 02:15)  T(F): 97 (2020 06:00), Max: 102.4 (2020 02:15)  HR: 72 (2020 07:00) (72 - 139)  BP: 109/59 (2020 07:00) (101/68 - 133/78)  BP(mean): 63 (2020 07:00) (63 - 94)  ABP: --  ABP(mean): --  RR: --  SpO2: 100% (2020 07:00) (100% - 100%)      Mode: AC/ CMV (Assist Control/ Continuous Mandatory Ventilation)  RR (machine): 20  TV (machine): 400  FiO2: 40  PEEP: 5  ITime: 1  MAP: 14  PIP: 26      I&O's Summary    2020 07:01  -  2020 07:00  --------------------------------------------------------  IN: 1747 mL / OUT: 400 mL / NET: 1347 mL        Physical Exam:  SEE BELOW                          7.5    16.73 )-----------( 82       ( 2020 06:55 )             21.9       07-01    133<L>  |  96  |  77<H>  ----------------------------<  89  2.8<LL>   |  25  |  1.94<H>    Ca    7.1<L>      2020 06:55  Phos  3.5     07-  Mg     2.0     07-01                      DVT Prophylaxis:                                                            Contraindication:     Advanced Directives:    Discussed with:    Visit Information:  Time spent excluding procedure:  45 cc mins    ** Time is exclusive of billed procedures and/or teaching and/or routine family updates.

## 2020-07-03 LAB
ANION GAP SERPL CALC-SCNC: 12 MMOL/L — SIGNIFICANT CHANGE UP (ref 5–17)
BUN SERPL-MCNC: 86 MG/DL — HIGH (ref 7–23)
CALCIUM SERPL-MCNC: 7.7 MG/DL — LOW (ref 8.5–10.1)
CHLORIDE SERPL-SCNC: 101 MMOL/L — SIGNIFICANT CHANGE UP (ref 96–108)
CO2 SERPL-SCNC: 24 MMOL/L — SIGNIFICANT CHANGE UP (ref 22–31)
CREAT SERPL-MCNC: 1.56 MG/DL — HIGH (ref 0.5–1.3)
GLUCOSE SERPL-MCNC: 95 MG/DL — SIGNIFICANT CHANGE UP (ref 70–99)
HCT VFR BLD CALC: 15.6 % — CRITICAL LOW (ref 34.5–45)
HCT VFR BLD CALC: 24 % — LOW (ref 34.5–45)
HGB BLD-MCNC: 5.2 G/DL — CRITICAL LOW (ref 11.5–15.5)
HGB BLD-MCNC: 8.2 G/DL — LOW (ref 11.5–15.5)
MAGNESIUM SERPL-MCNC: 2.1 MG/DL — SIGNIFICANT CHANGE UP (ref 1.6–2.6)
MCHC RBC-ENTMCNC: 29.1 PG — SIGNIFICANT CHANGE UP (ref 27–34)
MCHC RBC-ENTMCNC: 29.7 PG — SIGNIFICANT CHANGE UP (ref 27–34)
MCHC RBC-ENTMCNC: 33.3 GM/DL — SIGNIFICANT CHANGE UP (ref 32–36)
MCHC RBC-ENTMCNC: 34.2 GM/DL — SIGNIFICANT CHANGE UP (ref 32–36)
MCV RBC AUTO: 85.1 FL — SIGNIFICANT CHANGE UP (ref 80–100)
MCV RBC AUTO: 89.1 FL — SIGNIFICANT CHANGE UP (ref 80–100)
PHOSPHATE SERPL-MCNC: 4.4 MG/DL — SIGNIFICANT CHANGE UP (ref 2.5–4.5)
PLATELET # BLD AUTO: 333 K/UL — SIGNIFICANT CHANGE UP (ref 150–400)
PLATELET # BLD AUTO: 351 K/UL — SIGNIFICANT CHANGE UP (ref 150–400)
POTASSIUM SERPL-MCNC: 3.3 MMOL/L — LOW (ref 3.5–5.3)
POTASSIUM SERPL-SCNC: 3.3 MMOL/L — LOW (ref 3.5–5.3)
RBC # BLD: 1.75 M/UL — LOW (ref 3.8–5.2)
RBC # BLD: 2.82 M/UL — LOW (ref 3.8–5.2)
RBC # FLD: 16.7 % — HIGH (ref 10.3–14.5)
RBC # FLD: 17.4 % — HIGH (ref 10.3–14.5)
SODIUM SERPL-SCNC: 137 MMOL/L — SIGNIFICANT CHANGE UP (ref 135–145)
WBC # BLD: 23.35 K/UL — HIGH (ref 3.8–10.5)
WBC # BLD: 25.52 K/UL — HIGH (ref 3.8–10.5)
WBC # FLD AUTO: 23.35 K/UL — HIGH (ref 3.8–10.5)
WBC # FLD AUTO: 25.52 K/UL — HIGH (ref 3.8–10.5)

## 2020-07-03 PROCEDURE — 99291 CRITICAL CARE FIRST HOUR: CPT

## 2020-07-03 RX ORDER — ACETAMINOPHEN 500 MG
325 TABLET ORAL EVERY 6 HOURS
Refills: 0 | Status: DISCONTINUED | OUTPATIENT
Start: 2020-07-03 | End: 2020-07-05

## 2020-07-03 RX ORDER — POTASSIUM CHLORIDE 20 MEQ
40 PACKET (EA) ORAL ONCE
Refills: 0 | Status: COMPLETED | OUTPATIENT
Start: 2020-07-03 | End: 2020-07-03

## 2020-07-03 RX ORDER — PANTOPRAZOLE SODIUM 20 MG/1
40 TABLET, DELAYED RELEASE ORAL EVERY 12 HOURS
Refills: 0 | Status: DISCONTINUED | OUTPATIENT
Start: 2020-07-03 | End: 2020-07-11

## 2020-07-03 RX ADMIN — OXYCODONE HYDROCHLORIDE 5 MILLIGRAM(S): 5 TABLET ORAL at 14:25

## 2020-07-03 RX ADMIN — PROPOFOL 3.26 MICROGRAM(S)/KG/MIN: 10 INJECTION, EMULSION INTRAVENOUS at 22:13

## 2020-07-03 RX ADMIN — CHLORHEXIDINE GLUCONATE 15 MILLILITER(S): 213 SOLUTION TOPICAL at 18:47

## 2020-07-03 RX ADMIN — Medication 650 MILLIGRAM(S): at 11:02

## 2020-07-03 RX ADMIN — CHLORHEXIDINE GLUCONATE 15 MILLILITER(S): 213 SOLUTION TOPICAL at 05:54

## 2020-07-03 RX ADMIN — Medication 1 APPLICATION(S): at 18:47

## 2020-07-03 RX ADMIN — MIDODRINE HYDROCHLORIDE 10 MILLIGRAM(S): 2.5 TABLET ORAL at 14:25

## 2020-07-03 RX ADMIN — Medication 40 MILLIEQUIVALENT(S): at 11:03

## 2020-07-03 RX ADMIN — PROPOFOL 3.26 MICROGRAM(S)/KG/MIN: 10 INJECTION, EMULSION INTRAVENOUS at 16:08

## 2020-07-03 RX ADMIN — Medication 100 MILLIGRAM(S): at 05:54

## 2020-07-03 RX ADMIN — Medication 100 MILLIGRAM(S): at 11:03

## 2020-07-03 RX ADMIN — PROPOFOL 3.26 MICROGRAM(S)/KG/MIN: 10 INJECTION, EMULSION INTRAVENOUS at 11:02

## 2020-07-03 RX ADMIN — PROPOFOL 3.26 MICROGRAM(S)/KG/MIN: 10 INJECTION, EMULSION INTRAVENOUS at 00:38

## 2020-07-03 RX ADMIN — PANTOPRAZOLE SODIUM 40 MILLIGRAM(S): 20 TABLET, DELAYED RELEASE ORAL at 11:05

## 2020-07-03 RX ADMIN — PIPERACILLIN AND TAZOBACTAM 25 GRAM(S): 4; .5 INJECTION, POWDER, LYOPHILIZED, FOR SOLUTION INTRAVENOUS at 05:54

## 2020-07-03 RX ADMIN — MIDODRINE HYDROCHLORIDE 10 MILLIGRAM(S): 2.5 TABLET ORAL at 05:54

## 2020-07-03 RX ADMIN — PIPERACILLIN AND TAZOBACTAM 25 GRAM(S): 4; .5 INJECTION, POWDER, LYOPHILIZED, FOR SOLUTION INTRAVENOUS at 22:04

## 2020-07-03 RX ADMIN — Medication 50 MILLIGRAM(S): at 05:53

## 2020-07-03 RX ADMIN — PIPERACILLIN AND TAZOBACTAM 25 GRAM(S): 4; .5 INJECTION, POWDER, LYOPHILIZED, FOR SOLUTION INTRAVENOUS at 14:26

## 2020-07-03 RX ADMIN — Medication 1 MILLIGRAM(S): at 08:36

## 2020-07-03 RX ADMIN — Medication 650 MILLIGRAM(S): at 05:54

## 2020-07-03 RX ADMIN — OXYCODONE HYDROCHLORIDE 5 MILLIGRAM(S): 5 TABLET ORAL at 08:34

## 2020-07-03 RX ADMIN — OXYCODONE HYDROCHLORIDE 5 MILLIGRAM(S): 5 TABLET ORAL at 01:37

## 2020-07-03 NOTE — PROGRESS NOTE ADULT - SUBJECTIVE AND OBJECTIVE BOX
[facility] # 9  CCU # 6  Vent # 6  [facility] CVL # 9    CC:  Sepsis     HPI:    40 y/o female with lupus, RA, reynards, epilepsy (last seizure 8 yrs ago), IVDA (clean for 4 years), who presented to ED today due to weakness. Pt states that her grandmother  approx. 3 months ago and for the past almost 2 months she has been snorting two bags of heroin a day. Pt states her heroin use is partially due to depression and anxiety since gma passed away however also due to financial reasons as can no longer afford oxycodone which she had been taking for her lupus/RA chronic pain as Rx. Pt states she has wounds all over her body from falls, ulcers from previous attempted injection sites, which are not healing and bleed (approx 2 tbs (RUE wound)) daily  Pt Moved to CCU on  for acute resp failure requiring intubation    :  Pt endorsed to me by Dr dey.  Pt seen and examined in CCU--vented and sedated--PAP --on phenyl gtt 0.3.  Multiple skin lesion--non healing ulcers and old track marks.  HIV negative.  COVID NEGATIVE.  Chest Chest-- Personally reviewed-- B/L GGO and consolidation R>L.  WBC 24.  Tm 101.7  Cr down 2.2  Chronically ill with Albumin 1  :  Tm 103.8  WBC 23.  Sputum negative so far.  on light sedation--still Encephalopathic.  On phenyl 0.4  Case d/w dr Diaz--Family refusing GRAHAM  :  Vented--awake and follows simple commands--very weak overall.  Cr down.  WBC down.  SBT--RR 50s within few mins   :  Tm 102  On SBT.  Very weak.  Eyes opened and engaging  7/3:  Tm 101.3  Tolerated 4 hrs of SBT but with mod WOB during trial.  Full resuscitation     PMH:  As above.     PSH:  As above.     FH: Non Contributory other than those listed in HPI    Social History:  As above    MEDICATIONS  (STANDING):  chlorhexidine 0.12% Liquid 15 milliLiter(s) Oral Mucosa every 12 hours  collagenase Ointment 1 Application(s) Topical daily  dextrose 5%. 1000 milliLiter(s) (50 mL/Hr) IV Continuous <Continuous>  dextrose 50% Injectable 12.5 Gram(s) IV Push once  dextrose 50% Injectable 25 Gram(s) IV Push once  dextrose 50% Injectable 25 Gram(s) IV Push once  folic acid 1 milliGRAM(s) Oral daily  midodrine 10 milliGRAM(s) Oral every 8 hours  oxyCODONE    IR 5 milliGRAM(s) Oral every 6 hours  pantoprazole  Injectable 40 milliGRAM(s) IV Push daily  phenylephrine    Infusion 0.5 MICROgram(s)/kG/Min (10.2 mL/Hr) IV Continuous <Continuous>  piperacillin/tazobactam IVPB.. 3.375 Gram(s) IV Intermittent every 8 hours  propofol Infusion 10 MICROgram(s)/kG/Min (3.26 mL/Hr) IV Continuous <Continuous>  silver sulfADIAZINE 1% Cream 1 Application(s) Topical daily  thiamine 100 milliGRAM(s) Oral daily  vancomycin  IVPB 500 milliGRAM(s) IV Intermittent daily    MEDICATIONS  (PRN):  acetaminophen   Tablet .. 650 milliGRAM(s) Oral every 4 hours PRN Temp greater or equal to 38C (100.4F), Mild Pain (1 - 3)  dextrose 40% Gel 15 Gram(s) Oral once PRN Blood Glucose LESS THAN 70 milliGRAM(s)/deciLiter  glucagon  Injectable 1 milliGRAM(s) IntraMuscular once PRN Glucose <70 milliGRAM(s)/deciLiter  hydrOXYzine hydrochloride 50 milliGRAM(s) Oral every 4 hours PRN Anxiety  loperamide 2 milliGRAM(s) Oral every 4 hours PRN Loose stool      Allergies: NKDA    ROS:  SEE BELOW        ICU Vital Signs Last 24 Hrs  T(C): 37.6 (2020 05:39), Max: 38.5 (2020 20:00)  T(F): 99.6 (2020 05:39), Max: 101.3 (2020 20:00)  HR: 121 (2020 08:11) (74 - 130)  BP: 111/73 (2020 07:00) (102/78 - 125/83)  BP(mean): 81 (2020 07:00) (76 - 94)  ABP: --  ABP(mean): --  RR: --  SpO2: 100% (2020 08:11) (100% - 100%)      Mode: PS (Pressure Support)/ Spontaneous  FiO2: 30  PEEP: 5  PS: 10  MAP: 9.1  PIP: 16      I&O's Summary    2020 07:01  -  2020 07:00  --------------------------------------------------------  IN: 2283 mL / OUT: 0 mL / NET: 2283 mL        Physical Exam:  SEE BELOW                          6.0    22.27 )-----------( 172      ( 2020 10:25 )             17.6       07-02    136  |  101  |  81<H>  ----------------------------<  107<H>  3.0<L>   |  25  |  1.66<H>    Ca    7.3<L>      2020 10:25  Phos  3.9     07-02  Mg     2.0     07-02                      DVT Prophylaxis:                                                            Contraindication:     Advanced Directives:    Discussed with:    Visit Information:  Time spent excluding procedure:  45 cc mins    ** Time is exclusive of billed procedures and/or teaching and/or routine family updates.

## 2020-07-03 NOTE — PROGRESS NOTE ADULT - SUBJECTIVE AND OBJECTIVE BOX
Patient is a 39y Female who remains intubated.     arousable on vent  eyes open   making more urine per staff   no new events now     MEDICATIONS  (STANDING):  chlorhexidine 0.12% Liquid 15 milliLiter(s) Oral Mucosa every 12 hours  collagenase Ointment 1 Application(s) Topical daily  dextrose 5%. 1000 milliLiter(s) (50 mL/Hr) IV Continuous <Continuous>  dextrose 50% Injectable 12.5 Gram(s) IV Push once  dextrose 50% Injectable 25 Gram(s) IV Push once  dextrose 50% Injectable 25 Gram(s) IV Push once  folic acid 1 milliGRAM(s) Oral daily  midodrine 10 milliGRAM(s) Oral every 8 hours  oxyCODONE    IR 5 milliGRAM(s) Oral every 6 hours  pantoprazole  Injectable 40 milliGRAM(s) IV Push daily  phenylephrine    Infusion 0.5 MICROgram(s)/kG/Min (10.2 mL/Hr) IV Continuous <Continuous>  piperacillin/tazobactam IVPB.. 3.375 Gram(s) IV Intermittent every 8 hours  propofol Infusion 10 MICROgram(s)/kG/Min (3.26 mL/Hr) IV Continuous <Continuous>  silver sulfADIAZINE 1% Cream 1 Application(s) Topical daily  thiamine 100 milliGRAM(s) Oral daily  vancomycin  IVPB 500 milliGRAM(s) IV Intermittent daily    MEDICATIONS  (PRN):  acetaminophen   Tablet .. 650 milliGRAM(s) Oral every 4 hours PRN Temp greater or equal to 38C (100.4F), Mild Pain (1 - 3)  dextrose 40% Gel 15 Gram(s) Oral once PRN Blood Glucose LESS THAN 70 milliGRAM(s)/deciLiter  glucagon  Injectable 1 milliGRAM(s) IntraMuscular once PRN Glucose <70 milliGRAM(s)/deciLiter  hydrOXYzine hydrochloride 50 milliGRAM(s) Oral every 4 hours PRN Anxiety  loperamide 2 milliGRAM(s) Oral every 4 hours PRN Loose stool      Vital Signs Last 24 Hrs  T(C): 37.9 (03 Jul 2020 14:00), Max: 38.5 (02 Jul 2020 20:00)  T(F): 100.2 (03 Jul 2020 14:00), Max: 101.3 (02 Jul 2020 20:00)  HR: 109 (03 Jul 2020 14:00) (81 - 135)  BP: 116/78 (03 Jul 2020 14:00) (102/78 - 125/83)  BP(mean): 86 (03 Jul 2020 14:00) (76 - 94)  RR: --  SpO2: 100% (03 Jul 2020 14:00) (99% - 100%)        I&O's Detail    02 Jul 2020 07:01  -  03 Jul 2020 07:00  --------------------------------------------------------  IN:    Free Water: 560 mL    Nepro: 720 mL    propofol Infusion: 403 mL    Solution: 300 mL    Solution: 200 mL    Solution: 100 mL  Total IN: 2283 mL    OUT:  Total OUT: 0 mL    Total NET: 2283 mL      03 Jul 2020 07:01  -  03 Jul 2020 14:09  --------------------------------------------------------  IN:    Packed Red Blood Cells: 369 mL  Total IN: 369 mL    OUT:  Total OUT: 0 mL    Total NET: 369 mL      PHYSICAL EXAM:    Constitutional:  int, >then stated age  HEENT: dry MMM  Neck: No LAD, No JVD  Respiratory: dist BS  Cardiovascular: S1 and S2  Extremities: chronic peripheral edema +, LE lesions   Neurological: intub  :  Naomi  Skin: diffuse arm/leg old scars  Access: Not applicable        LABS:               137    |  101    |  86     ----------------------------<  95        03 Jul 2020 09:40  3.3     |  24     |  1.56     136    |  101    |  81     ----------------------------<  107       02 Jul 2020 10:25  3.0     |  25     |  1.66     133    |  96     |  77     ----------------------------<  89        01 Jul 2020 06:55  2.8     |  25     |  1.94     Ca    7.7        03 Jul 2020 09:40  Ca    7.3        02 Jul 2020 10:25    Phos  4.4       03 Jul 2020 09:40  Phos  3.9       02 Jul 2020 10:25    Mg     2.1       03 Jul 2020 09:40  Mg     2.0       02 Jul 2020 10:25                            5.2    25.52 )-----------( 351      ( 03 Jul 2020 09:40 )             15.6                         6.0    22.27 )-----------( 172      ( 02 Jul 2020 10:25 )             17.6                      Urine Studies:        RADIOLOGY & ADDITIONAL STUDIES:

## 2020-07-03 NOTE — PROGRESS NOTE ADULT - ASSESSMENT
38 yo female with hx of SLE, RA, ( formerly on MTX up to year ago - followed by Dr Del Rosario) , hx of IVDA and now heroin use  now presengint with weakness and found to be MRSA bacteremia and renal eval called for elevated creatinine    NATALIA    Cr improving     ATN   -  keep SBP > 110 for renal perfusion as able    -  oral protein supplementation    -  no ACE or ARB    -  Hypokalemia - repleted K today    - fluids via tube feeds    - no acute indication for HD but per previous discussion - pt Aunt would not want HD - however now pt may have a estranged  that is involved in her care - this is being explored per  Ethics/CCM team      MRSA bacteremia with septic shock now hypoxic resp failure w possible ARDS on pressors    IV abx /ID    Vent per ICU level of care        d/w ICU RN + Dr Rayo

## 2020-07-03 NOTE — PROGRESS NOTE ADULT - ASSESSMENT
IMP:    40 y/o female with lupus, RA, reynards, epilepsy (last seizure 8 yrs ago) and IVDA admitted with MRSA sepsis and septic shock--likely DIC and prob embolic phenomenal to /renal system  Thrombocytopenia   Anemia of chronic disease  Prob NATALIA--unknown baseline Cr.  No indication for emergent RRT   Acute type 1 resp failure with criteria for Mod ARDS   TM Encephalopathy--critical illness neuropathy   Chronically ill given Albumin around 1    High risk for acute decompensation and deterioration including death.  Critically ill  Full Resuscitation--Phoenix Mane is appointed HCP by pt      Plan:    Full vent support--LPV strategy to maintain plateau pressures < 30--High PEEP/low TV--Permissive hypercapnea and acidemia--SBT as tolerated--not candidate for safe extubation given neuropathy and general weakness  Cont with IV vanco (8) and Pip/charisse (7)  Actively titrate pressors to MAP > 60--Off now--Added midodrine   Wean sedation down for neuro eval  HOB > 30  TF as per protocol  Keep CVL in   DVT prophy--LLE SCD--no chemical given low plats  May need PC tx today--awaiting for labs    CCU care-- d/w ICU staff on multi disciplinary rounds

## 2020-07-04 LAB
ANION GAP SERPL CALC-SCNC: 13 MMOL/L — SIGNIFICANT CHANGE UP (ref 5–17)
BUN SERPL-MCNC: 81 MG/DL — HIGH (ref 7–23)
CALCIUM SERPL-MCNC: 7.6 MG/DL — LOW (ref 8.5–10.1)
CHLORIDE SERPL-SCNC: 104 MMOL/L — SIGNIFICANT CHANGE UP (ref 96–108)
CO2 SERPL-SCNC: 23 MMOL/L — SIGNIFICANT CHANGE UP (ref 22–31)
CREAT SERPL-MCNC: 1.42 MG/DL — HIGH (ref 0.5–1.3)
GLUCOSE SERPL-MCNC: 93 MG/DL — SIGNIFICANT CHANGE UP (ref 70–99)
HCT VFR BLD CALC: 23 % — LOW (ref 34.5–45)
HGB BLD-MCNC: 7.8 G/DL — LOW (ref 11.5–15.5)
MAGNESIUM SERPL-MCNC: 2 MG/DL — SIGNIFICANT CHANGE UP (ref 1.6–2.6)
MCHC RBC-ENTMCNC: 29.1 PG — SIGNIFICANT CHANGE UP (ref 27–34)
MCHC RBC-ENTMCNC: 33.9 GM/DL — SIGNIFICANT CHANGE UP (ref 32–36)
MCV RBC AUTO: 85.8 FL — SIGNIFICANT CHANGE UP (ref 80–100)
PHOSPHATE SERPL-MCNC: 5.2 MG/DL — HIGH (ref 2.5–4.5)
PLATELET # BLD AUTO: 344 K/UL — SIGNIFICANT CHANGE UP (ref 150–400)
POTASSIUM SERPL-MCNC: 3 MMOL/L — LOW (ref 3.5–5.3)
POTASSIUM SERPL-SCNC: 3 MMOL/L — LOW (ref 3.5–5.3)
RBC # BLD: 2.68 M/UL — LOW (ref 3.8–5.2)
RBC # FLD: 17.2 % — HIGH (ref 10.3–14.5)
SODIUM SERPL-SCNC: 140 MMOL/L — SIGNIFICANT CHANGE UP (ref 135–145)
WBC # BLD: 21.3 K/UL — HIGH (ref 3.8–10.5)
WBC # FLD AUTO: 21.3 K/UL — HIGH (ref 3.8–10.5)

## 2020-07-04 PROCEDURE — 99291 CRITICAL CARE FIRST HOUR: CPT

## 2020-07-04 RX ORDER — POTASSIUM CHLORIDE 20 MEQ
40 PACKET (EA) ORAL EVERY 4 HOURS
Refills: 0 | Status: COMPLETED | OUTPATIENT
Start: 2020-07-04 | End: 2020-07-04

## 2020-07-04 RX ADMIN — Medication 1 MILLIGRAM(S): at 08:50

## 2020-07-04 RX ADMIN — Medication 40 MILLIEQUIVALENT(S): at 08:50

## 2020-07-04 RX ADMIN — PANTOPRAZOLE SODIUM 40 MILLIGRAM(S): 20 TABLET, DELAYED RELEASE ORAL at 17:28

## 2020-07-04 RX ADMIN — PROPOFOL 3.26 MICROGRAM(S)/KG/MIN: 10 INJECTION, EMULSION INTRAVENOUS at 07:48

## 2020-07-04 RX ADMIN — OXYCODONE HYDROCHLORIDE 5 MILLIGRAM(S): 5 TABLET ORAL at 08:50

## 2020-07-04 RX ADMIN — Medication 40 MILLIEQUIVALENT(S): at 12:04

## 2020-07-04 RX ADMIN — Medication 100 MILLIGRAM(S): at 08:50

## 2020-07-04 RX ADMIN — PROPOFOL 3.26 MICROGRAM(S)/KG/MIN: 10 INJECTION, EMULSION INTRAVENOUS at 16:18

## 2020-07-04 RX ADMIN — MIDODRINE HYDROCHLORIDE 10 MILLIGRAM(S): 2.5 TABLET ORAL at 21:26

## 2020-07-04 RX ADMIN — OXYCODONE HYDROCHLORIDE 5 MILLIGRAM(S): 5 TABLET ORAL at 14:03

## 2020-07-04 RX ADMIN — Medication 100 MILLIGRAM(S): at 05:14

## 2020-07-04 RX ADMIN — CHLORHEXIDINE GLUCONATE 15 MILLILITER(S): 213 SOLUTION TOPICAL at 05:14

## 2020-07-04 RX ADMIN — PROPOFOL 3.26 MICROGRAM(S)/KG/MIN: 10 INJECTION, EMULSION INTRAVENOUS at 02:15

## 2020-07-04 RX ADMIN — PROPOFOL 3.26 MICROGRAM(S)/KG/MIN: 10 INJECTION, EMULSION INTRAVENOUS at 21:26

## 2020-07-04 RX ADMIN — OXYCODONE HYDROCHLORIDE 5 MILLIGRAM(S): 5 TABLET ORAL at 21:26

## 2020-07-04 RX ADMIN — PANTOPRAZOLE SODIUM 40 MILLIGRAM(S): 20 TABLET, DELAYED RELEASE ORAL at 05:13

## 2020-07-04 RX ADMIN — Medication 1 APPLICATION(S): at 18:28

## 2020-07-04 RX ADMIN — CHLORHEXIDINE GLUCONATE 15 MILLILITER(S): 213 SOLUTION TOPICAL at 17:27

## 2020-07-04 RX ADMIN — Medication 650 MILLIGRAM(S): at 12:02

## 2020-07-04 RX ADMIN — Medication 40 MILLIEQUIVALENT(S): at 17:27

## 2020-07-04 NOTE — PROGRESS NOTE ADULT - ASSESSMENT
IMP:    40 y/o female with lupus, RA, reynards, epilepsy (last seizure 8 yrs ago) and IVDA admitted with MRSA sepsis and septic shock--likely DIC and prob embolic phenomenal to /renal system  Thrombocytopenia--recovered  Anemia--likely gastritis   Prob NATALIA--unknown baseline Cr.  No indication for emergent RRT   Acute type 1 resp failure with criteria for Mod ARDS   TM Encephalopathy--critical illness neuropathy   Chronically ill given Albumin around 1    High risk for acute decompensation and deterioration including death.  Critically ill  Full Resuscitation--Phoenix Mane is appointed HCP by pt      Plan:    Full vent support--LPV strategy to maintain plateau pressures < 30--High PEEP/low TV--Permissive hypercapnea and acidemia--SBT as tolerated--not candidate for safe extubation given neuropathy and general weakness  Cont with IV vanco (9) and completed course of Pip/charisse (7)  Actively titrate pressors to MAP > 60--Off now--Added midodrine   Wean sedation down for neuro eval  HOB > 30  TF as per protocol  Keep CVL in   DVT prophy--LLE SCD--no chemical given low plats  Will obtain GI eval if H/H cont to decrease     CCU care-- d/w ICU staff on multi disciplinary rounds

## 2020-07-04 NOTE — PROGRESS NOTE ADULT - ASSESSMENT
38 yo female with hx of SLE, RA, ( formerly on MTX up to year ago - followed by Dr Del Rosario) , hx of IVDA and now heroin use  now presengint with weakness and found to be MRSA bacteremia and renal eval called for elevated creatinine    NATALIA    Cr improving     ATN   -  keep SBP > 110 for renal perfusion as able    -  oral protein supplementation    -  no ACE or ARB    -  Hypokalemia - repleted K today    - fluids via tube feeds    - no acute indication for HD but per previous discussion - pt Aunt would not want HD - however now pt may have a estranged  that is involved in her care - this is being explored per  Ethics/CCM team      MRSA bacteremia with septic shock now hypoxic resp failure w possible ARDS on pressors    IV abx /ID    Vent per ICU level of care        d/w ICU RN + Dr Rayo 38 yo female with hx of SLE, RA, ( formerly on MTX up to year ago - followed by Dr Del Rosario) , hx of IVDA and now heroin use  now presengint with weakness and found to be MRSA bacteremia and renal eval called for elevated creatinine    NATALIA    Cr improving     ATN   -  keep SBP > 110 for renal perfusion as able    -  oral protein supplementation    -  no ACE or ARB    -  Hypokalemia - repleted K today    - fluids via tube feeds    - no acute indication for HD but per previous discussion - pt Aunt would not want HD - however now pt may have a estranged  that is involved in her care - this is being explored per  Ethics/CCM team      MRSA bacteremia with septic shock now hypoxic resp failure w possible ARDS on pressors    IV abx /ID    Vent per ICU level of care      7/4 SY  --NATALIA/ATN : renal function improving slowly.  --Replete K.  --Resp : continue vent support tolerating weaning poorly.  --Bacteremia : Continue abtx per ID.    Rusty Soni/Jean will resume care 7/6

## 2020-07-04 NOTE — PROGRESS NOTE ADULT - SUBJECTIVE AND OBJECTIVE BOX
[facility] # 10  CCU # 7  Vent # 7  [facility] CVL # 10    CC:  Sepsis     HPI:    38 y/o female with lupus, RA, reynards, epilepsy (last seizure 8 yrs ago), IVDA (clean for 4 years), who presented to ED today due to weakness. Pt states that her grandmother  approx. 3 months ago and for the past almost 2 months she has been snorting two bags of heroin a day. Pt states her heroin use is partially due to depression and anxiety since gma passed away however also due to financial reasons as can no longer afford oxycodone which she had been taking for her lupus/RA chronic pain as Rx. Pt states she has wounds all over her body from falls, ulcers from previous attempted injection sites, which are not healing and bleed (approx 2 tbs (RUE wound)) daily  Pt Moved to CCU on  for acute resp failure requiring intubation    :  Pt endorsed to me by Dr dey.  Pt seen and examined in CCU--vented and sedated--PAP ---on phenyl gtt 0.3.  Multiple skin lesion--non healing ulcers and old track marks.  HIV negative.  COVID NEGATIVE.  Chest Chest-- Personally reviewed-- B/L GGO and consolidation R>L.  WBC 24.  Tm 101.7  Cr down 2.2  Chronically ill with Albumin 1  :  Tm 103.8  WBC 23.  Sputum negative so far.  on light sedation--still Encephalopathic.  On phenyl 0.4  Case d/w dr Diaz--Family refusing GRAHAM  :  Vented--awake and follows simple commands--very weak overall.  Cr down.  WBC down.  SBT--RR 50s within few mins   :  Tm 102  On SBT.  Very weak.  Eyes opened and engaging  7/3:  Tm 101.3  Tolerated 4 hrs of SBT but with mod WOB during trial.  Full resuscitation   :  Tm 101  Possible coffee rounds via OGT. S/P 2u PC tx on 7/3. SBT ongoing--increased RR--will tolerate extubation.  c/o pain    PMH:  As above.     PSH:  As above.     FH: Non Contributory other than those listed in HPI    Social History:  As above    MEDICATIONS  (STANDING):  chlorhexidine 0.12% Liquid 15 milliLiter(s) Oral Mucosa every 12 hours  collagenase Ointment 1 Application(s) Topical daily  dextrose 5%. 1000 milliLiter(s) (50 mL/Hr) IV Continuous <Continuous>  dextrose 50% Injectable 12.5 Gram(s) IV Push once  dextrose 50% Injectable 25 Gram(s) IV Push once  dextrose 50% Injectable 25 Gram(s) IV Push once  folic acid 1 milliGRAM(s) Oral daily  midodrine 10 milliGRAM(s) Oral every 8 hours  oxyCODONE    IR 5 milliGRAM(s) Oral every 6 hours  pantoprazole  Injectable 40 milliGRAM(s) IV Push every 12 hours  phenylephrine    Infusion 0.5 MICROgram(s)/kG/Min (10.2 mL/Hr) IV Continuous <Continuous>  potassium chloride   Powder 40 milliEquivalent(s) Oral every 4 hours  propofol Infusion 10 MICROgram(s)/kG/Min (3.26 mL/Hr) IV Continuous <Continuous>  silver sulfADIAZINE 1% Cream 1 Application(s) Topical daily  thiamine 100 milliGRAM(s) Oral daily  vancomycin  IVPB 500 milliGRAM(s) IV Intermittent daily    MEDICATIONS  (PRN):  acetaminophen   Tablet .. 650 milliGRAM(s) Oral every 4 hours PRN Temp greater or equal to 38C (100.4F), Mild Pain (1 - 3)  acetaminophen  Suppository .. 325 milliGRAM(s) Rectal every 6 hours PRN Temp greater or equal to 38.5C (101.3F), Mild Pain (1 - 3)  dextrose 40% Gel 15 Gram(s) Oral once PRN Blood Glucose LESS THAN 70 milliGRAM(s)/deciLiter  glucagon  Injectable 1 milliGRAM(s) IntraMuscular once PRN Glucose <70 milliGRAM(s)/deciLiter  hydrOXYzine hydrochloride 50 milliGRAM(s) Oral every 4 hours PRN Anxiety  loperamide 2 milliGRAM(s) Oral every 4 hours PRN Loose stool      Allergies: NKDA    ROS:  SEE BELOW        ICU Vital Signs Last 24 Hrs  T(C): 36.1 (2020 05:36), Max: 38.6 (2020 21:00)  T(F): 97 (2020 05:36), Max: 101.4 (2020 21:00)  HR: 120 (2020 09:00) (72 - 141)  BP: 122/81 (2020 09:00) (110/76 - 137/89)  BP(mean): 90 (2020 09:00) (80 - 99)  ABP: --  ABP(mean): --  RR: --  SpO2: 100% (2020 09:00) (98% - 100%)      Mode: PS (Pressure Support)/ Spontaneous  RR (machine): 0  FiO2: 30  PEEP: 5  PS: 10  PIP: 16      I&O's Summary    2020 07:01  -  2020 07:00  --------------------------------------------------------  IN: 743 mL / OUT: 900 mL / NET: -157 mL        Physical Exam:  SEE BELOW                          7.8    21.30 )-----------( 344      ( 2020 06:29 )             23.0       07-04    140  |  104  |  81<H>  ----------------------------<  93  3.0<L>   |  23  |  1.42<H>    Ca    7.6<L>      2020 06:29  Phos  5.2     07-04  Mg     2.0     07-04                      DVT Prophylaxis:                                                            Contraindication:     Advanced Directives:    Discussed with:    Visit Information:  Time spent excluding procedure:  45 cc mins    ** Time is exclusive of billed procedures and/or teaching and/or routine family updates.

## 2020-07-04 NOTE — PROGRESS NOTE ADULT - SUBJECTIVE AND OBJECTIVE BOX
NEPHROLOGY INTERVAL HPI/OVERNIGHT EVENTS:    Date of Service: 20 @ 11:28    Covering for Rusty Soni/Jean   SY    HPI :   Pt is a 38 yo female with a pm hx of SLE ,RA ( last seen by dr medina 1 year ago?)   ,reynards, epilepsy (last seizure 8 yrs ago), IVDA (clean for 4 years), who presented to ED today due to weakness. Pt states that her grandmother  approx. 3 months ago and for the past almost 2 months she has been snorting two bags of heroin a day. Pt states her heroin use is partially due to depression and anxiety since she passed away however also due to financial reasons as can no longer afford oxycodone which she had been taking for her lupus/RA chronic pain as Rx. Pt states she has wounds all over her body from falls, ulcers from previous attempted injection sites, which are not healing and bleeding    Pt describes weakness as fatigue, endorses sob with exertion, weight loss due to decreased appetite from depression and drug use, palpitations. Denies n/v/d, constipation, HA, hematemesis, hematochezia, dark stools, abd pain, cp, fevers, cough, leg swelling.   In ED pt found to have lactate 3.9 with Hgb of 5.5, , T 100.4. pt was admitted to ICU   renal eval called for elevated Creatinine- no previous Cr available to compare  last seen by a physician 1 year ago . pt denies hx of renal disease in past  pt admits to daily Mobic use previously and now daily Advil use at home  complaining of pain all over and on even slight touch     PAST MEDICAL & SURGICAL HISTORY:  Smoker  Heroin abuse  H/O Raynaud's syndrome  Rheumatoid arthritis  Lupus  Gall bladder stones  H/O appendicitis  H/O tubal ligation    MEDICATIONS  (STANDING):  chlorhexidine 0.12% Liquid 15 milliLiter(s) Oral Mucosa every 12 hours  collagenase Ointment 1 Application(s) Topical daily  dextrose 5%. 1000 milliLiter(s) (50 mL/Hr) IV Continuous <Continuous>  dextrose 50% Injectable 12.5 Gram(s) IV Push once  dextrose 50% Injectable 25 Gram(s) IV Push once  dextrose 50% Injectable 25 Gram(s) IV Push once  folic acid 1 milliGRAM(s) Oral daily  midodrine 10 milliGRAM(s) Oral every 8 hours  oxyCODONE    IR 5 milliGRAM(s) Oral every 6 hours  pantoprazole  Injectable 40 milliGRAM(s) IV Push every 12 hours  potassium chloride   Powder 40 milliEquivalent(s) Oral every 4 hours  propofol Infusion 10 MICROgram(s)/kG/Min (3.26 mL/Hr) IV Continuous <Continuous>  silver sulfADIAZINE 1% Cream 1 Application(s) Topical daily  thiamine 100 milliGRAM(s) Oral daily  vancomycin  IVPB 500 milliGRAM(s) IV Intermittent daily    MEDICATIONS  (PRN):  acetaminophen   Tablet .. 650 milliGRAM(s) Oral every 4 hours PRN Temp greater or equal to 38C (100.4F), Mild Pain (1 - 3)  acetaminophen  Suppository .. 325 milliGRAM(s) Rectal every 6 hours PRN Temp greater or equal to 38.5C (101.3F), Mild Pain (1 - 3)  dextrose 40% Gel 15 Gram(s) Oral once PRN Blood Glucose LESS THAN 70 milliGRAM(s)/deciLiter  glucagon  Injectable 1 milliGRAM(s) IntraMuscular once PRN Glucose <70 milliGRAM(s)/deciLiter  hydrOXYzine hydrochloride 50 milliGRAM(s) Oral every 4 hours PRN Anxiety  loperamide 2 milliGRAM(s) Oral every 4 hours PRN Loose stool          Vital Signs Last 24 Hrs  T(C): 36.1 (2020 05:36), Max: 38.6 (2020 21:00)  T(F): 97 (2020 05:36), Max: 101.4 (2020 21:00)  HR: 134 (2020 11:00) (72 - 141)  BP: 109/61 (2020 11:00) (109/61 - 137/89)  BP(mean): 69 (2020 11:00) (69 - 99)  RR: --  SpO2: 100% (2020 11:00) (98% - 100%)  Daily     Daily Weight in k.8 (2020 05:36)    07-03 @ 07:01  -  07-04 @ 07:00  --------------------------------------------------------  IN: 743 mL / OUT: 900 mL / NET: -157 mL        PHYSICAL EXAM:  GENERAL:   CHEST/LUNG:   HEART:   ABDOMEN:   EXTREMITIES:   SKIN:     LABS:                        7.8    21.30 )-----------( 344      ( 2020 06:29 )             23.0     07-04    140  |  104  |  81<H>  ----------------------------<  93  3.0<L>   |  23  |  1.42<H>    Ca    7.6<L>      2020 06:29  Phos  5.2     07-  Mg     2.0     -          Magnesium, Serum: 2.0 mg/dL ( @ 06:29)  Phosphorus Level, Serum: 5.2 mg/dL ( @ 06:29)          RADIOLOGY & ADDITIONAL TESTS: NEPHROLOGY INTERVAL HPI/OVERNIGHT EVENTS:    Date of Service: 20 @ 11:28    Covering for Rusty Soni/Jean   SY  Remains on vent.  Eyes open and tracking.  No significant response to command    HPI :   Pt is a 38 yo female with a pm hx of SLE ,RA ( last seen by dr medina 1 year ago?)   ,reynards, epilepsy (last seizure 8 yrs ago), IVDA (clean for 4 years), who presented to ED today due to weakness. Pt states that her grandmother  approx. 3 months ago and for the past almost 2 months she has been snorting two bags of heroin a day. Pt states her heroin use is partially due to depression and anxiety since she passed away however also due to financial reasons as can no longer afford oxycodone which she had been taking for her lupus/RA chronic pain as Rx. Pt states she has wounds all over her body from falls, ulcers from previous attempted injection sites, which are not healing and bleeding    Pt describes weakness as fatigue, endorses sob with exertion, weight loss due to decreased appetite from depression and drug use, palpitations. Denies n/v/d, constipation, HA, hematemesis, hematochezia, dark stools, abd pain, cp, fevers, cough, leg swelling.   In ED pt found to have lactate 3.9 with Hgb of 5.5, , T 100.4. pt was admitted to ICU   renal eval called for elevated Creatinine- no previous Cr available to compare  last seen by a physician 1 year ago . pt denies hx of renal disease in past  pt admits to daily Mobic use previously and now daily Advil use at home  complaining of pain all over and on even slight touch     PAST MEDICAL & SURGICAL HISTORY:  Smoker  Heroin abuse  H/O Raynaud's syndrome  Rheumatoid arthritis  Lupus  Gall bladder stones  H/O appendicitis  H/O tubal ligation    MEDICATIONS  (STANDING):  chlorhexidine 0.12% Liquid 15 milliLiter(s) Oral Mucosa every 12 hours  collagenase Ointment 1 Application(s) Topical daily  dextrose 5%. 1000 milliLiter(s) (50 mL/Hr) IV Continuous <Continuous>  dextrose 50% Injectable 12.5 Gram(s) IV Push once  dextrose 50% Injectable 25 Gram(s) IV Push once  dextrose 50% Injectable 25 Gram(s) IV Push once  folic acid 1 milliGRAM(s) Oral daily  midodrine 10 milliGRAM(s) Oral every 8 hours  oxyCODONE    IR 5 milliGRAM(s) Oral every 6 hours  pantoprazole  Injectable 40 milliGRAM(s) IV Push every 12 hours  potassium chloride   Powder 40 milliEquivalent(s) Oral every 4 hours  propofol Infusion 10 MICROgram(s)/kG/Min (3.26 mL/Hr) IV Continuous <Continuous>  silver sulfADIAZINE 1% Cream 1 Application(s) Topical daily  thiamine 100 milliGRAM(s) Oral daily  vancomycin  IVPB 500 milliGRAM(s) IV Intermittent daily    MEDICATIONS  (PRN):  acetaminophen   Tablet .. 650 milliGRAM(s) Oral every 4 hours PRN Temp greater or equal to 38C (100.4F), Mild Pain (1 - 3)  acetaminophen  Suppository .. 325 milliGRAM(s) Rectal every 6 hours PRN Temp greater or equal to 38.5C (101.3F), Mild Pain (1 - 3)  dextrose 40% Gel 15 Gram(s) Oral once PRN Blood Glucose LESS THAN 70 milliGRAM(s)/deciLiter  glucagon  Injectable 1 milliGRAM(s) IntraMuscular once PRN Glucose <70 milliGRAM(s)/deciLiter  hydrOXYzine hydrochloride 50 milliGRAM(s) Oral every 4 hours PRN Anxiety  loperamide 2 milliGRAM(s) Oral every 4 hours PRN Loose stool    Vital Signs Last 24 Hrs  T(C): 36.1 (2020 05:36), Max: 38.6 (2020 21:00)  T(F): 97 (2020 05:36), Max: 101.4 (2020 21:00)  HR: 134 (2020 11:00) (72 - 141)  BP: 109/61 (2020 11:00) (109/61 - 137/89)  BP(mean): 69 (2020 11:00) (69 - 99)  RR: --  SpO2: 100% (2020 11:00) (98% - 100%)  Daily     Daily Weight in k.8 (2020 05:36)    07-03 @ 07:01  -  07-04 @ 07:00  --------------------------------------------------------  IN: 743 mL / OUT: 900 mL / NET: -157 mL    PHYSICAL EXAM: On vent.  GENERAL: No apparent distress  CHEST/LUNG: Fair air entry  HEART: S1S2 RRR  ABDOMEN: soft  EXTREMITIES: no edema  SKIN:     LABS:                        7.8    21.30 )-----------( 344      ( 2020 06:29 )             23.0     07-04    140  |  104  |  81<H>  ----------------------------<  93  3.0<L>   |  23  |  1.42<H>    Ca    7.6<L>      2020 06:29  Phos  5.2     07-04  Mg     2.0     07-          Magnesium, Serum: 2.0 mg/dL ( @ 06:29)  Phosphorus Level, Serum: 5.2 mg/dL ( @ 06:29)          RADIOLOGY & ADDITIONAL TESTS:

## 2020-07-05 LAB
ANION GAP SERPL CALC-SCNC: 11 MMOL/L — SIGNIFICANT CHANGE UP (ref 5–17)
BUN SERPL-MCNC: 77 MG/DL — HIGH (ref 7–23)
CALCIUM SERPL-MCNC: 7.6 MG/DL — LOW (ref 8.5–10.1)
CHLORIDE SERPL-SCNC: 108 MMOL/L — SIGNIFICANT CHANGE UP (ref 96–108)
CO2 SERPL-SCNC: 23 MMOL/L — SIGNIFICANT CHANGE UP (ref 22–31)
CREAT SERPL-MCNC: 1.25 MG/DL — SIGNIFICANT CHANGE UP (ref 0.5–1.3)
GLUCOSE SERPL-MCNC: 123 MG/DL — HIGH (ref 70–99)
HCT VFR BLD CALC: 22 % — LOW (ref 34.5–45)
HGB BLD-MCNC: 7.5 G/DL — LOW (ref 11.5–15.5)
MAGNESIUM SERPL-MCNC: 2.2 MG/DL — SIGNIFICANT CHANGE UP (ref 1.6–2.6)
MCHC RBC-ENTMCNC: 30.4 PG — SIGNIFICANT CHANGE UP (ref 27–34)
MCHC RBC-ENTMCNC: 34.1 GM/DL — SIGNIFICANT CHANGE UP (ref 32–36)
MCV RBC AUTO: 89.1 FL — SIGNIFICANT CHANGE UP (ref 80–100)
PHOSPHATE SERPL-MCNC: 5.3 MG/DL — HIGH (ref 2.5–4.5)
PLATELET # BLD AUTO: 382 K/UL — SIGNIFICANT CHANGE UP (ref 150–400)
POTASSIUM SERPL-MCNC: 3.7 MMOL/L — SIGNIFICANT CHANGE UP (ref 3.5–5.3)
POTASSIUM SERPL-SCNC: 3.7 MMOL/L — SIGNIFICANT CHANGE UP (ref 3.5–5.3)
RBC # BLD: 2.47 M/UL — LOW (ref 3.8–5.2)
RBC # FLD: 18.7 % — HIGH (ref 10.3–14.5)
SODIUM SERPL-SCNC: 142 MMOL/L — SIGNIFICANT CHANGE UP (ref 135–145)
WBC # BLD: 19.33 K/UL — HIGH (ref 3.8–10.5)
WBC # FLD AUTO: 19.33 K/UL — HIGH (ref 3.8–10.5)

## 2020-07-05 PROCEDURE — 99291 CRITICAL CARE FIRST HOUR: CPT

## 2020-07-05 RX ORDER — OXYCODONE HYDROCHLORIDE 5 MG/1
5 TABLET ORAL EVERY 4 HOURS
Refills: 0 | Status: DISCONTINUED | OUTPATIENT
Start: 2020-07-05 | End: 2020-07-10

## 2020-07-05 RX ORDER — MIDODRINE HYDROCHLORIDE 2.5 MG/1
5 TABLET ORAL EVERY 8 HOURS
Refills: 0 | Status: DISCONTINUED | OUTPATIENT
Start: 2020-07-05 | End: 2020-07-06

## 2020-07-05 RX ORDER — OXYCODONE HYDROCHLORIDE 5 MG/1
10 TABLET ORAL EVERY 6 HOURS
Refills: 0 | Status: DISCONTINUED | OUTPATIENT
Start: 2020-07-05 | End: 2020-07-06

## 2020-07-05 RX ADMIN — PROPOFOL 3.26 MICROGRAM(S)/KG/MIN: 10 INJECTION, EMULSION INTRAVENOUS at 18:13

## 2020-07-05 RX ADMIN — Medication 1 APPLICATION(S): at 14:09

## 2020-07-05 RX ADMIN — Medication 1 MILLIGRAM(S): at 12:41

## 2020-07-05 RX ADMIN — Medication 100 MILLIGRAM(S): at 06:11

## 2020-07-05 RX ADMIN — Medication 650 MILLIGRAM(S): at 12:42

## 2020-07-05 RX ADMIN — OXYCODONE HYDROCHLORIDE 5 MILLIGRAM(S): 5 TABLET ORAL at 14:15

## 2020-07-05 RX ADMIN — MIDODRINE HYDROCHLORIDE 10 MILLIGRAM(S): 2.5 TABLET ORAL at 06:10

## 2020-07-05 RX ADMIN — PANTOPRAZOLE SODIUM 40 MILLIGRAM(S): 20 TABLET, DELAYED RELEASE ORAL at 18:13

## 2020-07-05 RX ADMIN — CHLORHEXIDINE GLUCONATE 15 MILLILITER(S): 213 SOLUTION TOPICAL at 06:10

## 2020-07-05 RX ADMIN — OXYCODONE HYDROCHLORIDE 5 MILLIGRAM(S): 5 TABLET ORAL at 08:15

## 2020-07-05 RX ADMIN — OXYCODONE HYDROCHLORIDE 5 MILLIGRAM(S): 5 TABLET ORAL at 22:31

## 2020-07-05 RX ADMIN — OXYCODONE HYDROCHLORIDE 5 MILLIGRAM(S): 5 TABLET ORAL at 01:51

## 2020-07-05 RX ADMIN — Medication 1 APPLICATION(S): at 14:08

## 2020-07-05 RX ADMIN — CHLORHEXIDINE GLUCONATE 15 MILLILITER(S): 213 SOLUTION TOPICAL at 18:13

## 2020-07-05 RX ADMIN — PROPOFOL 3.26 MICROGRAM(S)/KG/MIN: 10 INJECTION, EMULSION INTRAVENOUS at 15:27

## 2020-07-05 RX ADMIN — Medication 100 MILLIGRAM(S): at 12:42

## 2020-07-05 RX ADMIN — PANTOPRAZOLE SODIUM 40 MILLIGRAM(S): 20 TABLET, DELAYED RELEASE ORAL at 05:46

## 2020-07-05 RX ADMIN — PROPOFOL 3.26 MICROGRAM(S)/KG/MIN: 10 INJECTION, EMULSION INTRAVENOUS at 09:44

## 2020-07-05 RX ADMIN — MIDODRINE HYDROCHLORIDE 10 MILLIGRAM(S): 2.5 TABLET ORAL at 22:32

## 2020-07-05 NOTE — PROGRESS NOTE ADULT - NEUROLOGICAL DETAILS
LE 1/5  UE 1/5
Awake.  UE 0/5  LE 1/5
Awake and engaging.  UE 0/5  LE 1/5
Awake and general weakness
Encephalopathic
Sedated
UE 0/5 and LE 1/5

## 2020-07-05 NOTE — PROGRESS NOTE ADULT - SUBJECTIVE AND OBJECTIVE BOX
[facility] # 11  CCU # 8  Vent # 8  [facility] CVL # 11    CC:  Sepsis     HPI:    38 y/o female with lupus, RA, reynards, epilepsy (last seizure 8 yrs ago), IVDA (clean for 4 years), who presented to ED today due to weakness. Pt states that her grandmother  approx. 3 months ago and for the past almost 2 months she has been snorting two bags of heroin a day. Pt states her heroin use is partially due to depression and anxiety since gma passed away however also due to financial reasons as can no longer afford oxycodone which she had been taking for her lupus/RA chronic pain as Rx. Pt states she has wounds all over her body from falls, ulcers from previous attempted injection sites, which are not healing and bleed (approx 2 tbs (RUE wound)) daily  Pt Moved to CCU on  for acute resp failure requiring intubation    :  Pt endorsed to me by Dr dey.  Pt seen and examined in CCU--vented and sedated--PAP ---on phenyl gtt 0.3.  Multiple skin lesion--non healing ulcers and old track marks.  HIV negative.  COVID NEGATIVE.  Chest Chest-- Personally reviewed-- B/L GGO and consolidation R>L.  WBC 24.  Tm 101.7  Cr down 2.2  Chronically ill with Albumin 1  :  Tm 103.8  WBC 23.  Sputum negative so far.  on light sedation--still Encephalopathic.  On phenyl 0.4  Case d/w dr Diaz--Family refusing GRAHAM  :  Vented--awake and follows simple commands--very weak overall.  Cr down.  WBC down.  SBT--RR 50s within few mins   :  Tm 102  On SBT.  Very weak.  Eyes opened and engaging  7/3:  Tm 101.3  Tolerated 4 hrs of SBT but with mod WOB during trial.  Full resuscitation   :  Tm 101  Possible coffee rounds via OGT. S/P 2u PC tx on 7/3. SBT ongoing--increased RR--will tolerate extubation.  c/o pain  :  Tm 101.  Vented and not doing well with SBT    PMH:  As above.     PSH:  As above.     FH: Non Contributory other than those listed in HPI    Social History:  Unobtainable due to clinical condition     MEDICATIONS  (STANDING):  chlorhexidine 0.12% Liquid 15 milliLiter(s) Oral Mucosa every 12 hours  collagenase Ointment 1 Application(s) Topical daily  dextrose 5%. 1000 milliLiter(s) (50 mL/Hr) IV Continuous <Continuous>  dextrose 50% Injectable 12.5 Gram(s) IV Push once  dextrose 50% Injectable 25 Gram(s) IV Push once  dextrose 50% Injectable 25 Gram(s) IV Push once  folic acid 1 milliGRAM(s) Oral daily  midodrine 10 milliGRAM(s) Oral every 8 hours  oxyCODONE    IR 5 milliGRAM(s) Oral every 6 hours  pantoprazole  Injectable 40 milliGRAM(s) IV Push every 12 hours  propofol Infusion 10 MICROgram(s)/kG/Min (3.26 mL/Hr) IV Continuous <Continuous>  silver sulfADIAZINE 1% Cream 1 Application(s) Topical daily  thiamine 100 milliGRAM(s) Oral daily  vancomycin  IVPB 500 milliGRAM(s) IV Intermittent daily    MEDICATIONS  (PRN):  acetaminophen   Tablet .. 650 milliGRAM(s) Oral every 4 hours PRN Temp greater or equal to 38C (100.4F), Mild Pain (1 - 3)  acetaminophen  Suppository .. 325 milliGRAM(s) Rectal every 6 hours PRN Temp greater or equal to 38.5C (101.3F), Mild Pain (1 - 3)  dextrose 40% Gel 15 Gram(s) Oral once PRN Blood Glucose LESS THAN 70 milliGRAM(s)/deciLiter  glucagon  Injectable 1 milliGRAM(s) IntraMuscular once PRN Glucose <70 milliGRAM(s)/deciLiter  hydrOXYzine hydrochloride 50 milliGRAM(s) Oral every 4 hours PRN Anxiety  loperamide 2 milliGRAM(s) Oral every 4 hours PRN Loose stool      Allergies: NKDA    ROS:  SEE BELOW        ICU Vital Signs Last 24 Hrs  T(C): 37.1 (2020 04:07), Max: 38.3 (2020 12:00)  T(F): 98.8 (2020 04:07), Max: 101 (2020 12:00)  HR: 117 (2020 06:00) (103 - 144)  BP: 124/90 (2020 06:00) (104/78 - 132/90)  BP(mean): 97 (2020 06:00) (69 - 101)  ABP: --  ABP(mean): --  RR: --  SpO2: 100% (2020 06:00) (85% - 100%)      Mode: AC/ CMV (Assist Control/ Continuous Mandatory Ventilation)  RR (machine): 20  TV (machine): 400  FiO2: 50  PEEP: 5  ITime: 1  PIP: 26      I&O's Summary    2020 07:01  -  2020 07:00  --------------------------------------------------------  IN: 781 mL / OUT: 300 mL / NET: 481 mL        Physical Exam:  SEE BELOW                          7.5    19.33 )-----------( 382      ( 2020 07:00 )             22.0       07-05    142  |  108  |  77<H>  ----------------------------<  123<H>  3.7   |  23  |  1.25    Ca    7.6<L>      2020 07:00  Phos  5.3     07-05  Mg     2.2     07-05                      DVT Prophylaxis:                                                            Contraindication:     Advanced Directives:    Discussed with:    Visit Information:  Time spent excluding procedure:  30 cc mins    ** Time is exclusive of billed procedures and/or teaching and/or routine family updates.

## 2020-07-05 NOTE — PROGRESS NOTE ADULT - ASSESSMENT
IMP:    38 y/o female with lupus, RA, reynards, epilepsy (last seizure 8 yrs ago) and IVDA admitted with MRSA sepsis and septic shock--likely DIC and prob embolic phenomenal to /renal system  Thrombocytopenia--recovered  Anemia--likely stress gastritis   Prob NATALIA--unknown baseline Cr--recovered   Acute type 1 resp failure with criteria for Mod ARDS   TM Encephalopathy--critical illness neuropathy   Chronically ill given Albumin around 1    High risk for acute decompensation and deterioration including death.  Critically ill  Full Resuscitation--Phoenix Alhaji is appointed HCP by pt      Plan:    Full vent support--LPV strategy to maintain plateau pressures < 30--High PEEP/low TV--Permissive hypercapnea and acidemia--SBT as tolerated--not candidate for safe extubation given neuropathy and general weakness.  Will likely need trach and PEG which she has agreed to   Cont with IV vanco (10) and completed course of Pip/charisse (7)  Actively titrate pressors to MAP > 60--Off now--Added midodrine   Wean sedation down for neuro eval  HOB > 30  TF as per protocol  Keep CVL in   DVT prophy--LLE SCD--no chemical given anemia   Will obtain GI eval if H/H cont to decrease     CCU care-- d/w ICU staff on multi disciplinary rounds

## 2020-07-06 LAB
ANION GAP SERPL CALC-SCNC: 9 MMOL/L — SIGNIFICANT CHANGE UP (ref 5–17)
BUN SERPL-MCNC: 61 MG/DL — HIGH (ref 7–23)
CALCIUM SERPL-MCNC: 7.4 MG/DL — LOW (ref 8.5–10.1)
CHLORIDE SERPL-SCNC: 112 MMOL/L — HIGH (ref 96–108)
CO2 SERPL-SCNC: 24 MMOL/L — SIGNIFICANT CHANGE UP (ref 22–31)
CREAT SERPL-MCNC: 0.98 MG/DL — SIGNIFICANT CHANGE UP (ref 0.5–1.3)
GLUCOSE SERPL-MCNC: 93 MG/DL — SIGNIFICANT CHANGE UP (ref 70–99)
MAGNESIUM SERPL-MCNC: 2 MG/DL — SIGNIFICANT CHANGE UP (ref 1.6–2.6)
PHOSPHATE SERPL-MCNC: 4.4 MG/DL — SIGNIFICANT CHANGE UP (ref 2.5–4.5)
POTASSIUM SERPL-MCNC: 2.7 MMOL/L — CRITICAL LOW (ref 3.5–5.3)
POTASSIUM SERPL-SCNC: 2.7 MMOL/L — CRITICAL LOW (ref 3.5–5.3)
SODIUM SERPL-SCNC: 145 MMOL/L — SIGNIFICANT CHANGE UP (ref 135–145)

## 2020-07-06 PROCEDURE — 99291 CRITICAL CARE FIRST HOUR: CPT

## 2020-07-06 PROCEDURE — 99233 SBSQ HOSP IP/OBS HIGH 50: CPT

## 2020-07-06 PROCEDURE — 71045 X-RAY EXAM CHEST 1 VIEW: CPT | Mod: 26

## 2020-07-06 RX ORDER — FUROSEMIDE 40 MG
20 TABLET ORAL ONCE
Refills: 0 | Status: COMPLETED | OUTPATIENT
Start: 2020-07-06 | End: 2020-07-06

## 2020-07-06 RX ORDER — HEPARIN SODIUM 5000 [USP'U]/ML
5000 INJECTION INTRAVENOUS; SUBCUTANEOUS EVERY 8 HOURS
Refills: 0 | Status: DISCONTINUED | OUTPATIENT
Start: 2020-07-06 | End: 2020-07-14

## 2020-07-06 RX ORDER — POTASSIUM CHLORIDE 20 MEQ
40 PACKET (EA) ORAL EVERY 4 HOURS
Refills: 0 | Status: COMPLETED | OUTPATIENT
Start: 2020-07-06 | End: 2020-07-07

## 2020-07-06 RX ORDER — HYDROMORPHONE HYDROCHLORIDE 2 MG/ML
1 INJECTION INTRAMUSCULAR; INTRAVENOUS; SUBCUTANEOUS ONCE
Refills: 0 | Status: DISCONTINUED | OUTPATIENT
Start: 2020-07-06 | End: 2020-07-06

## 2020-07-06 RX ORDER — LANOLIN ALCOHOL/MO/W.PET/CERES
5 CREAM (GRAM) TOPICAL AT BEDTIME
Refills: 0 | Status: DISCONTINUED | OUTPATIENT
Start: 2020-07-06 | End: 2020-07-31

## 2020-07-06 RX ADMIN — CHLORHEXIDINE GLUCONATE 15 MILLILITER(S): 213 SOLUTION TOPICAL at 16:59

## 2020-07-06 RX ADMIN — Medication 40 MILLIEQUIVALENT(S): at 21:35

## 2020-07-06 RX ADMIN — Medication 2 MILLIGRAM(S): at 21:35

## 2020-07-06 RX ADMIN — Medication 40 MILLIEQUIVALENT(S): at 17:00

## 2020-07-06 RX ADMIN — Medication 1 MILLIGRAM(S): at 12:33

## 2020-07-06 RX ADMIN — OXYCODONE HYDROCHLORIDE 5 MILLIGRAM(S): 5 TABLET ORAL at 12:33

## 2020-07-06 RX ADMIN — OXYCODONE HYDROCHLORIDE 10 MILLIGRAM(S): 5 TABLET ORAL at 05:21

## 2020-07-06 RX ADMIN — HYDROMORPHONE HYDROCHLORIDE 1 MILLIGRAM(S): 2 INJECTION INTRAMUSCULAR; INTRAVENOUS; SUBCUTANEOUS at 23:57

## 2020-07-06 RX ADMIN — Medication 1 APPLICATION(S): at 09:46

## 2020-07-06 RX ADMIN — Medication 100 MILLIGRAM(S): at 05:21

## 2020-07-06 RX ADMIN — HEPARIN SODIUM 5000 UNIT(S): 5000 INJECTION INTRAVENOUS; SUBCUTANEOUS at 21:35

## 2020-07-06 RX ADMIN — Medication 100 MILLIGRAM(S): at 12:33

## 2020-07-06 RX ADMIN — CHLORHEXIDINE GLUCONATE 15 MILLILITER(S): 213 SOLUTION TOPICAL at 05:21

## 2020-07-06 RX ADMIN — PANTOPRAZOLE SODIUM 40 MILLIGRAM(S): 20 TABLET, DELAYED RELEASE ORAL at 05:20

## 2020-07-06 RX ADMIN — PROPOFOL 3.26 MICROGRAM(S)/KG/MIN: 10 INJECTION, EMULSION INTRAVENOUS at 05:21

## 2020-07-06 RX ADMIN — HEPARIN SODIUM 5000 UNIT(S): 5000 INJECTION INTRAVENOUS; SUBCUTANEOUS at 15:16

## 2020-07-06 RX ADMIN — Medication 5 MILLIGRAM(S): at 21:35

## 2020-07-06 RX ADMIN — PANTOPRAZOLE SODIUM 40 MILLIGRAM(S): 20 TABLET, DELAYED RELEASE ORAL at 21:35

## 2020-07-06 RX ADMIN — Medication 20 MILLIGRAM(S): at 09:44

## 2020-07-06 RX ADMIN — PROPOFOL 3.26 MICROGRAM(S)/KG/MIN: 10 INJECTION, EMULSION INTRAVENOUS at 21:34

## 2020-07-06 RX ADMIN — OXYCODONE HYDROCHLORIDE 5 MILLIGRAM(S): 5 TABLET ORAL at 21:35

## 2020-07-06 RX ADMIN — Medication 650 MILLIGRAM(S): at 09:43

## 2020-07-06 RX ADMIN — Medication 2 MILLIGRAM(S): at 14:45

## 2020-07-06 NOTE — PROGRESS NOTE ADULT - ASSESSMENT
38 y/o female with lupus, RA,   epilepsy (last seizure 8 yrs ago) and IVDA admitted with MRSA sepsis and septic shock-   Thrombocytopenia--recovered  Anemia--likely stress gastritis   Prob NATALIA--unknown baseline Cr--recovering   Acute type 1 resp failure with criteria for Mod ARDS   TM Encephalopathy--critical illness neuropathy   Chronically ill given Albumin around 1    High risk for acute decompensation and deterioration including death.  Critically ill  Full Resuscitation--Phoenix Mane is appointed HCP by pt      Plan:    Full vent support-- will give weaning trial , may need to consider trach  Cont with IV vanco (11) and completed course of Pip/charisse (8)  off pressors  awake off sedation  HOB > 30  TF    DVT prophy--LLE SCD- restart heparin subq 40 y/o female with lupus, RA,   epilepsy (last seizure 8 yrs ago) and IVDA admitted with MRSA sepsis and septic shock-   Thrombocytopenia--recovered  Anemia--likely stress gastritis   Prob NATALIA--unknown baseline Cr--recovering   Acute type 1 resp failure with criteria for Mod ARDS   TM Encephalopathy--critical illness neuropathy   Chronically ill given Albumin around 1    High risk for acute decompensation and deterioration including death.  Critically ill  Full Resuscitation--Phoenix Mane is appointed HCP by pt      Plan:    Full vent support-- will give weaning trial , may need to consider trach  Cont with IV vanco (11) and completed course of Pip/charisse (8)  off pressors  awake off sedation  HOB > 30  TF    DVT prophy--LLE SCD- restart heparin subq  check f/u blood cultures last culture 6/23 positive 40 y/o female with lupus, RA,   epilepsy (last seizure 8 yrs ago) and IVDA admitted with MRSA sepsis and septic shock-   Thrombocytopenia--recovered  Anemia--likely stress gastritis   Prob NATALIA--unknown baseline Cr--recovering   Acute type 1 resp failure with criteria for Mod ARDS   TM Encephalopathy--critical illness neuropathy   Chronically ill given Albumin around 1    High risk for acute decompensation and deterioration including death.  Critically ill  Full Resuscitation--Phoenix Mane is appointed HCP by pt      Plan:    Full vent support-- will give weaning trial , may need to consider trach  Cont with IV vanco (11) and completed course of Pip/charisse (8)  off pressors  awake off sedation  HOB > 30  TF    DVT prophy--LLE SCD- restart heparin subq  check f/u blood cultures last culture 6/23 positive  hgb 7 if any lower will transfuse, may need endoscopy if hgb continues to drift down on IV Protonix

## 2020-07-06 NOTE — PROGRESS NOTE ADULT - SUBJECTIVE AND OBJECTIVE BOX
39 year old woman with a history of Lupus, RA, Raynaud's seizure disorder, anxiety/depression, and drug abuse who presented to the ER on 6/23/2020 with complaints of weakness and fever; diagnosed with MRSA bacteremia --  cardiology was consulted on 6/26 with request for GRAHAM.  Signed off June 30th after family member making decisions did not want invasive procedures code status was DNR/DNI.  Since then health care proxy has been changed to .  Code status has been changed to full code &  agrees to GRAHAM.    6/27/20:  Overnight events noted and discussed with Dr Comer (acute respiratory failure, lactic acidosis).  6/28/20: Sedated on vent  6/29/'20: remains sedated on vent.  6/30/'20: remains intubated, sedated.  7/6/'20: intubated but arousable, responds to commands.  Described GRAHAM procedure to pt.    MEDICATIONS:  MEDICATIONS  (STANDING):  chlorhexidine 0.12% Liquid 15 milliLiter(s) Oral Mucosa every 12 hours  collagenase Ointment 1 Application(s) Topical daily  dextrose 5%. 1000 milliLiter(s) (50 mL/Hr) IV Continuous <Continuous>  dextrose 50% Injectable 12.5 Gram(s) IV Push once  dextrose 50% Injectable 25 Gram(s) IV Push once  dextrose 50% Injectable 25 Gram(s) IV Push once  folic acid 1 milliGRAM(s) Oral daily  heparin   Injectable 5000 Unit(s) SubCutaneous every 8 hours  melatonin 5 milliGRAM(s) Oral at bedtime  pantoprazole  Injectable 40 milliGRAM(s) IV Push every 12 hours  propofol Infusion 10 MICROgram(s)/kG/Min (3.26 mL/Hr) IV Continuous <Continuous>  silver sulfADIAZINE 1% Cream 1 Application(s) Topical daily  thiamine 100 milliGRAM(s) Oral daily  vancomycin  IVPB 500 milliGRAM(s) IV Intermittent daily    MEDICATIONS  (PRN):  acetaminophen   Tablet .. 650 milliGRAM(s) Oral every 4 hours PRN Temp greater or equal to 38C (100.4F), Mild Pain (1 - 3)  dextrose 40% Gel 15 Gram(s) Oral once PRN Blood Glucose LESS THAN 70 milliGRAM(s)/deciLiter  glucagon  Injectable 1 milliGRAM(s) IntraMuscular once PRN Glucose <70 milliGRAM(s)/deciLiter  hydrOXYzine hydrochloride 50 milliGRAM(s) Oral every 4 hours PRN Anxiety  loperamide 2 milliGRAM(s) Oral every 4 hours PRN Loose stool  LORazepam     Tablet 2 milliGRAM(s) Oral every 6 hours PRN Anxiety  oxyCODONE    IR 5 milliGRAM(s) Oral every 4 hours PRN Moderate Pain (4 - 6)    Vital Signs Last 24 Hrs  T(C): 37 (06 Jul 2020 05:54), Max: 38.3 (05 Jul 2020 12:40)  T(F): 98.6 (06 Jul 2020 05:54), Max: 101 (05 Jul 2020 12:40)  HR: 91 (06 Jul 2020 09:00) (88 - 137)  BP: 127/85 (06 Jul 2020 07:00) (115/85 - 150/106)  BP(mean): 94 (06 Jul 2020 07:00) (90 - 117)  RR: --  SpO2: 100% (06 Jul 2020 09:00) (99% - 100%)    I&O's Summary    05 Jul 2020 07:01  -  06 Jul 2020 07:00  --------------------------------------------------------  IN: 1530 mL / OUT: 1450 mL / NET: 80 mL        PHYSICAL EXAM:    Constitutional: Semirecumbent in bed on vent, sedated, appears older than stated age  HEENT: ETT to vent  Pulmonary: Bilateral breath sounds with scattered rhonchi  Cardiovascular: regular rate & rhythm, diffuse edema  Ab: soft, NT,ND, +BS  extremities: multiple ulcers        LABS: All Labs Reviewed:                        7.5    19.33 )-----------( 382      ( 05 Jul 2020 07:00 )             22.0                         7.8    21.30 )-----------( 344      ( 04 Jul 2020 06:29 )             23.0                         8.2    23.35 )-----------( 333      ( 03 Jul 2020 23:25 )             24.0     05 Jul 2020 07:00    142    |  108    |  77     ----------------------------<  123    3.7     |  23     |  1.25   04 Jul 2020 06:29    140    |  104    |  81     ----------------------------<  93     3.0     |  23     |  1.42     Ca    7.6        05 Jul 2020 07:00  Ca    7.6        04 Jul 2020 06:29  Phos  5.3       05 Jul 2020 07:00  Phos  5.2       04 Jul 2020 06:29  Mg     2.2       05 Jul 2020 07:00  Mg     2.0       04 Jul 2020 06:29    12 Lead ECG (06.24.20 @ 10:51):  Normal sinus rhythm, Low voltage QRS    TTE Echo Complete w/o Contrast w/ Doppler (06.24.20 @ 14:01):     The left ventricle is normal in size, wall thickness, wall motion and contractility. Estimated left ventricular ejection fraction is 60 %.   The right atrium is normal in size.   A catheter is noted in the right atrium.   The aortic valve is trileaflet with thin pliable leaflets.   The mitral valve leaflets appear thickened.   Trace mitral regurgitation is present.   The tricuspid valve leaflets appear mildly thickened and/or calcified, but open well.   Trace tricuspid valve regurgitation is present.   Trace pulmonic valvular regurgitation is present.    US Abdomen Complete (06.25.20 @ 17:46):     Avascular and mildly echogenic soft tissue mass surrounding portal vein, biliary ducts, and RIGHT hepatic artery of indeterminate etiology. FOLLOW-UP CONTRAST CT SCAN RECOMMENDED.,  Status post cholecystectomy.Mild splenomegaly.Fatty infiltration of liver.    Xray Chest 1 View-PORTABLE IMMEDIATE (06.23.20 @ 22:54):  1. Right IJ central venous catheter.  2. Nonspecific vertical, linear density in the peripheral right upper lobe, likely confluence of shadows/artifact or on the patient.  3. Otherwise, no acute cardiopulmonary disease findings.

## 2020-07-06 NOTE — PROGRESS NOTE ADULT - PROBLEM SELECTOR PLAN 2
Acute decompensation; remains on vent support with lactic acidosis / critical illness. Acute decompensation; remains on vent support w/ difficulty weaning from vent.  Will likely need trach & PEG.

## 2020-07-06 NOTE — PROGRESS NOTE ADULT - SUBJECTIVE AND OBJECTIVE BOX
Remains on vent.  Eyes open and tracking.      HPI :   Pt is a 38 yo female with a pm hx of SLE ,RA ( last seen by dr medina 1 year ago?)   ,reynards, epilepsy (last seizure 8 yrs ago), IVDA (clean for 4 years), who presented to ED today due to weakness. Pt states that her grandmother  approx. 3 months ago and for the past almost 2 months she has been snorting two bags of heroin a day. Pt states her heroin use is partially due to depression and anxiety since she passed away however also due to financial reasons as can no longer afford oxycodone which she had been taking for her lupus/RA chronic pain as Rx. Pt states she has wounds all over her body from falls, ulcers from previous attempted injection sites, which are not healing and bleeding    Pt describes weakness as fatigue, endorses sob with exertion, weight loss due to decreased appetite from depression and drug use, palpitations. Denies n/v/d, constipation, HA, hematemesis, hematochezia, dark stools, abd pain, cp, fevers, cough, leg swelling.   In ED pt found to have lactate 3.9 with Hgb of 5.5, , T 100.4. pt was admitted to ICU   renal eval called for elevated Creatinine- no previous Cr available to compare  last seen by a physician 1 year ago . pt denies hx of renal disease in past  pt admits to daily Mobic use previously and now daily Advil use at home  complaining of pain all over and on even slight touch     PAST MEDICAL & SURGICAL HISTORY:  Smoker  Heroin abuse  H/O Raynaud's syndrome  Rheumatoid arthritis  Lupus  Gall bladder stones  H/O appendicitis  H/O tubal ligation    MEDICATIONS  (STANDING):  chlorhexidine 0.12% Liquid 15 milliLiter(s) Oral Mucosa every 12 hours  collagenase Ointment 1 Application(s) Topical daily  dextrose 5%. 1000 milliLiter(s) (50 mL/Hr) IV Continuous <Continuous>  dextrose 50% Injectable 12.5 Gram(s) IV Push once  dextrose 50% Injectable 25 Gram(s) IV Push once  dextrose 50% Injectable 25 Gram(s) IV Push once  folic acid 1 milliGRAM(s) Oral daily  heparin   Injectable 5000 Unit(s) SubCutaneous every 8 hours  melatonin 5 milliGRAM(s) Oral at bedtime  pantoprazole  Injectable 40 milliGRAM(s) IV Push every 12 hours  propofol Infusion 10 MICROgram(s)/kG/Min (3.26 mL/Hr) IV Continuous <Continuous>  silver sulfADIAZINE 1% Cream 1 Application(s) Topical daily  thiamine 100 milliGRAM(s) Oral daily  vancomycin  IVPB 500 milliGRAM(s) IV Intermittent daily    MEDICATIONS  (PRN):  acetaminophen   Tablet .. 650 milliGRAM(s) Oral every 4 hours PRN Temp greater or equal to 38C (100.4F), Mild Pain (1 - 3)  dextrose 40% Gel 15 Gram(s) Oral once PRN Blood Glucose LESS THAN 70 milliGRAM(s)/deciLiter  glucagon  Injectable 1 milliGRAM(s) IntraMuscular once PRN Glucose <70 milliGRAM(s)/deciLiter  hydrOXYzine hydrochloride 50 milliGRAM(s) Oral every 4 hours PRN Anxiety  loperamide 2 milliGRAM(s) Oral every 4 hours PRN Loose stool  LORazepam     Tablet 2 milliGRAM(s) Oral every 6 hours PRN Anxiety  oxyCODONE    IR 5 milliGRAM(s) Oral every 4 hours PRN Moderate Pain (4 - 6)    Vital Signs Last 24 Hrs  T(C): 37 (2020 05:54), Max: 38.3 (2020 12:40)  T(F): 98.6 (2020 05:54), Max: 101 (2020 12:40)  HR: 95 (2020 10:55) (88 - 137)  BP: 127/91 (2020 10:00) (115/85 - 137/99)  BP(mean): 98 (2020 10:00) (90 - 108)  RR: --  SpO2: 100% (2020 10:55) (100% - 100%)    I&O's Detail    2020 07:01  -  2020 07:00  --------------------------------------------------------  IN:    Free Water: 390 mL    Nepro: 650 mL    propofol Infusion: 390 mL    Solution: 100 mL  Total IN: 1530 mL    OUT:    Incontinent per Collection Ba mL  Total OUT: 1450 mL    Total NET: 80 mL          PHYSICAL EXAM: On vent.  GENERAL: No apparent distress  CHEST/LUNG: Fair air entry  HEART: S1S2 RRR  ABDOMEN: soft  EXTREMITIES:  edema +   SKin: multiple lesions,scars of LE     LABS:                                   7.5    19.33 )-----------( 382      ( 2020 07:00 )             22.0         142    |  108    |  77     ----------------------------<  123       2020 07:00  3.7     |  23     |  1.25     140    |  104    |  81     ----------------------------<  93        2020 06:29  3.0     |  23     |  1.42     137    |  101    |  86     ----------------------------<  95        2020 09:40  3.3     |  24     |  1.56     Ca    7.6        2020 07:00  Ca    7.6        2020 06:29  Albumin, Serum: 1.1 g/dL (.20 @ 06:00)      Phos  5.3       2020 07:00  Phos  5.2       2020 06:29    Mg     2.2       2020 07:00  Mg     2.0       2020 06:29        RADIOLOGY & ADDITIONAL TESTS:

## 2020-07-06 NOTE — PROGRESS NOTE ADULT - SUBJECTIVE AND OBJECTIVE BOX
Date of service: 20 @ 14:03    Remains intubated on ventilatory support  Fever is down  BC were unable to be collected due to poor IV access    ROS: unobtainable    MEDICATIONS  (STANDING):  chlorhexidine 0.12% Liquid 15 milliLiter(s) Oral Mucosa every 12 hours  collagenase Ointment 1 Application(s) Topical daily  dextrose 5%. 1000 milliLiter(s) (50 mL/Hr) IV Continuous <Continuous>  dextrose 50% Injectable 12.5 Gram(s) IV Push once  dextrose 50% Injectable 25 Gram(s) IV Push once  dextrose 50% Injectable 25 Gram(s) IV Push once  folic acid 1 milliGRAM(s) Oral daily  heparin   Injectable 5000 Unit(s) SubCutaneous every 8 hours  melatonin 5 milliGRAM(s) Oral at bedtime  pantoprazole  Injectable 40 milliGRAM(s) IV Push every 12 hours  propofol Infusion 10 MICROgram(s)/kG/Min (3.26 mL/Hr) IV Continuous <Continuous>  silver sulfADIAZINE 1% Cream 1 Application(s) Topical daily  thiamine 100 milliGRAM(s) Oral daily  vancomycin  IVPB 500 milliGRAM(s) IV Intermittent daily    Vital Signs Last 24 Hrs  T(C): 37 (2020 05:54), Max: 37.3 (2020 17:23)  T(F): 98.6 (2020 05:54), Max: 99.2 (2020 17:23)  HR: 121 (2020 13:44) (88 - 129)  BP: 124/85 (2020 13:00) (115/85 - 139/90)  BP(mean): 93 (2020 13:00) (90 - 101)  RR: --  SpO2: 99% (2020 13:44) (99% - 100%)  Mode: AC/ CMV (Assist Control/ Continuous Mandatory Ventilation), RR (machine): 20, TV (machine): 400, FiO2: 30, PEEP: 5, ITime: 1, PIP: 26     Physical exam:    Constitutional:  No acute distress  HEENT: NC/AT, EOMI, PERRLA, conjunctivae clear  Neck: supple; thyroid not palpable  Back: no tenderness  Respiratory: respiratory effort normal; clear to auscultation  Cardiovascular: S1S2 regular, no murmurs  Abdomen: soft, not tender, not distended, positive BS  Genitourinary: no suprapubic tenderness  Lymphatic: no LN palpable  Musculoskeletal: no muscle tenderness, no joint swelling or tenderness; multiple contusions  Extremities: no pedal edema  Neurological/ Psychiatric: confused; moving all extremities  Skin: multiple skin ulcers and rashes; no drainage    Labs: reviewed                        7.5    19.33 )-----------( 382      ( 2020 07:00 )             22.0     07-05    142  |  108  |  77<H>  ----------------------------<  123<H>  3.7   |  23  |  1.25    Ca    7.6<L>      2020 07:00  Phos  5.3     07-05  Mg     2.2     07-05                          9.1    23.31 )-----------( 62       ( 2020 05:00 )             26.7     06-30    130<L>  |  94<L>  |  72<H>  ----------------------------<  84  3.5   |  26  |  2.25<H>    Ca    7.0<L>      2020 05:00  Phos  3.8     06-30  Mg     2.0     06-30    TPro  5.4<L>  /  Alb  1.1<L>  /  TBili  0.7  /  DBili  x   /  AST  74<H>  /  ALT  25  /  AlkPhos  362<H>  06-29    Vancomycin Level, Trough: 29.8 ug/mL ( @ 05:00)                          7.4    8.05  )-----------( 100      ( 2020 07:05 )             21.9     06-24    134<L>  |  104  |  48<H>  ----------------------------<  68<L>  4.8   |  20<L>  |  2.31<H>    Ca    7.2<L>      2020 07:05  Phos  3.3     06-24  Mg     1.1     06-24    TPro  4.8<L>  /  Alb  0.8<L>  /  TBili  1.4<H>  /  DBili  x   /  AST  73<H>  /  ALT  27  /  AlkPhos  298<H>  06-24     LIVER FUNCTIONS - ( 2020 07:05 )  Alb: 0.8 g/dL / Pro: 4.8 gm/dL / ALK PHOS: 298 U/L / ALT: 27 U/L / AST: 73 U/L / GGT: x           Urinalysis Basic - ( 2020 05:41 )    Color: Ale / Appearance: Slightly Turbid / S.010 / pH: x  Gluc: x / Ketone: Negative  / Bili: Negative / Urobili: 1 mg/dL   Blood: x / Protein: 30 mg/dL / Nitrite: Negative   Leuk Esterase: Moderate / RBC: 11-25 /HPF / WBC 3-5   Sq Epi: x / Non Sq Epi: Negative / Bacteria: Many      Culture - Sputum (collected 2020 08:29)  Source: .Sputum Sputum trap  Gram Stain (2020 14:45):    No Squamous epithelial cells per low power field    Moderate polymorphonuclear leukocytes per low power field    Moderate Yeast per oil power field  Preliminary Report (2020 10:05):    Normal Respiratory Elsa present    Culture - Urine (collected 2020 05:41)  Source: .Urine None  Final Report (2020 10:38):    >100,000 CFU/ml Methicillin resistant Staphylococcus aureus    <10,000 CFU/ml Normal Urogenital elsa present  Organism: Methicillin resistant Staphylococcus aureus (2020 10:38)  Organism: Methicillin resistant Staphylococcus aureus (2020 10:38)      -  Ampicillin/Sulbactam: R <=8/4      -  Cefazolin: R 8      -  Daptomycin: S 1      -  Gentamicin: S <=1 Should not be used as monotherapy      -  Linezolid: S 2      -  Oxacillin: R >2      -  Penicillin: R >8      -  RIF- Rifampin: S <=1 Should not be used as monotherapy      -  Tetra/Doxy: S <=1      -  Trimethoprim/Sulfamethoxazole: S <=0.5/9.5      -  Vancomycin: S 2      Method Type: KRYSTEN    Culture - Blood (collected 2020 22:22)  Source: .Blood None  Gram Stain (2020 15:07):    Growth in aerobic bottle: Gram Positive Cocci in Clusters    Growth in anaerobic bottle: Gram Positive Cocci in Clusters  Final Report (2020 10:45):    Growth in aerobic and anaerobic bottles: Methicillin resistant    Staphylococcus aureus  Organism: Blood Culture PCR  Methicillin resistant Staphylococcus aureus (2020 10:45)  Organism: Methicillin resistant Staphylococcus aureus (2020 10:45)      -  Ampicillin/Sulbactam: R <=8/4      -  Cefazolin: R 8      -  Clindamycin: R >4      -  Daptomycin: S 1      -  Erythromycin: R >4      -  Gentamicin: S <=1 Should not be used as monotherapy      -  Linezolid: S 2      -  Oxacillin: R >2      -  Penicillin: R >8      -  RIF- Rifampin: S <=1 Should not be used as monotherapy      -  Tetra/Doxy: S <=1      -  Trimethoprim/Sulfamethoxazole: S <=0.5/9.5      -  Vancomycin: S 2      Method Type: KRYSTEN  Organism: Blood Culture PCR (2020 10:45)      -  Methicillin resistant Staphylococcus aureus (MRSA): Detec      Method Type: PCR    Culture - Blood (collected 2020 22:22)  Source: .Blood None  Gram Stain (2020 17:42):    Growth in aerobic bottle: Gram Positive Cocci in Clusters    Growth in anaerobic bottle: Gram Positive Cocci in Clusters  Final Report (2020 10:50):    Growth in aerobic and anaerobic bottles: Methicillin resistant    Staphylococcus aureus    See previous culture [id number]        Radiology: all available radiological tests reviewed    Cardiac Echo:  The left ventricle is normal in size, wall thickness, wall motion and   contractility.   Estimated left ventricular ejection fraction is 60 %.   The right atrium is normal in size.   A catheter is noted in the right atrium.   The aortic valve is trileaflet with thin pliable leaflets.   The mitral valve leaflets appear thickened.   Trace mitral regurgitation is present.   The tricuspid valve leaflets appear mildly thickened and/or calcified, but   open well.   Trace tricuspid valve regurgitation is present.   Trace pulmonic valvular regurgitation is present.    < from: US Abdomen Complete (20 @ 17:46) >  Avascular and mildly echogenic soft tissue mass surrounding portal vein, biliary ducts, and RIGHT hepatic artery of indeterminate etiology. FOLLOW-UP CONTRAST CT SCAN RECOMMENDED.,  Status post cholecystectomy.  Mild splenomegaly.  Fatty infiltration ofliver.    < end of copied text >      Advanced directives addressed: full resuscitation

## 2020-07-06 NOTE — PROGRESS NOTE ADULT - ASSESSMENT
38 yo female with hx of SLE, RA, ( formerly on MTX up to year ago - followed by Dr Del Rosario) , hx of IVDA and now heroin use  now presengint with weakness and found to be MRSA bacteremia and renal eval called for elevated creatinine    NATALIA    Cr improving     ATN   -  keep SBP > 110 for renal perfusion as able    -  oral protein supplementation    -  no ACE or ARB    -  fluids via tube feeds    - replete mlanutrition/hypoalb w tub feeds      MRSA bacteremia with septic shock hypoxic resp failure    IV abx /ID    Vent per ICU level of care     d/w CCU RN

## 2020-07-06 NOTE — PROGRESS NOTE ADULT - PROBLEM SELECTOR PLAN 1
MRSA bacteremia - receiving IV antibiotics; GRAHAM planned then d/emmanuel due to family wishes, now family agreeing to GRAHAM.  Anemia in last 2-3 days requiring transfusion noted w/ possible coffee grounds in OGT 7/4, possible stress gastritis, possible GI consult if further signs of bleeding.  Discussed w/ Dr. Adamson, will wait 2-3 days to confirm Hb stable prior to performing GRAHAM. MRSA bacteremia - receiving IV antibiotics; GRAHAM planned then d/emmanuel due to family wishes, now family agreeing to GRAHAM.  Discussed w/ Dr. Hancock (ID), repeat blood ordered, even if neg, high suspicion of endocarditis & will plan for GRAHAM for further evaluation.  Anemia in last 2-3 days requiring transfusion noted w/ possible coffee grounds in OGT 7/4, possible stress gastritis, possible GI consult if further signs of bleeding.  Discussed w/ Dr. Adamson (ICU), will wait 2-3 days to confirm Hb stable prior to performing GRAHAM. MRSA bacteremia - receiving IV antibiotics; GRAHAM planned then d/emmanuel due to family wishes, now family agreeing to GRAHAM.  Discussed w/ Dr. Hancock (ID), repeat blood ordered, even if neg, high suspicion of endocarditis & will plan for GRAHAM for further evaluation.  Anemia in last 2-3 days requiring transfusion noted w/ possible coffee grounds in OGT 7/4, possible stress gastritis.  Discussed w/ ICU (dom) GI consulted for possible EGD & for eval. for PEG placement.  Await GI eval. & clearance prior to GRAHAM.

## 2020-07-06 NOTE — PROGRESS NOTE ADULT - SUBJECTIVE AND OBJECTIVE BOX
Events Overnight: on vent, tachypneic, fevers better.    HPI:     38 y/o female with lupus, RA,   epilepsy (last seizure 8 yrs ago), IVDA  , who presented to ED today due to weakness. Pt states that her grandmother  approx. 3 months ago and for the past almost 2 months she has been snorting two bags of heroin a day.  Patient developed septic shock , MRSA bacteremia on , Patient was intubated on .  Developed acute renal failure, creatinine has improved, bp is better  developed anemia, possible UGI bleed.   Tachypnea with weaning    ROS - cant assess      MEDICATIONS  (STANDING):  chlorhexidine 0.12% Liquid 15 milliLiter(s) Oral Mucosa every 12 hours  collagenase Ointment 1 Application(s) Topical daily  dextrose 5%. 1000 milliLiter(s) (50 mL/Hr) IV Continuous <Continuous>  dextrose 50% Injectable 12.5 Gram(s) IV Push once  dextrose 50% Injectable 25 Gram(s) IV Push once  dextrose 50% Injectable 25 Gram(s) IV Push once  folic acid 1 milliGRAM(s) Oral daily  melatonin 5 milliGRAM(s) Oral at bedtime  midodrine 5 milliGRAM(s) Oral every 8 hours  oxyCODONE    IR 10 milliGRAM(s) Oral every 6 hours  pantoprazole  Injectable 40 milliGRAM(s) IV Push every 12 hours  propofol Infusion 10 MICROgram(s)/kG/Min (3.26 mL/Hr) IV Continuous <Continuous>  silver sulfADIAZINE 1% Cream 1 Application(s) Topical daily  thiamine 100 milliGRAM(s) Oral daily  vancomycin  IVPB 500 milliGRAM(s) IV Intermittent daily    MEDICATIONS  (PRN):  acetaminophen   Tablet .. 650 milliGRAM(s) Oral every 4 hours PRN Temp greater or equal to 38C (100.4F), Mild Pain (1 - 3)  dextrose 40% Gel 15 Gram(s) Oral once PRN Blood Glucose LESS THAN 70 milliGRAM(s)/deciLiter  glucagon  Injectable 1 milliGRAM(s) IntraMuscular once PRN Glucose <70 milliGRAM(s)/deciLiter  hydrOXYzine hydrochloride 50 milliGRAM(s) Oral every 4 hours PRN Anxiety  loperamide 2 milliGRAM(s) Oral every 4 hours PRN Loose stool  LORazepam     Tablet 2 milliGRAM(s) Oral every 6 hours PRN Anxiety  oxyCODONE    IR 5 milliGRAM(s) Oral every 4 hours PRN Moderate Pain (4 - 6)      ICU Vital Signs Last 24 Hrs  T(C): 37 (2020 05:54), Max: 38.3 (2020 12:40)  T(F): 98.6 (2020 05:54), Max: 101 (2020 12:40)  HR: 88 (2020 07:00) (88 - 137)  BP: 127/85 (2020 07:00) (115/85 - 150/106)  BP(mean): 94 (2020 07:00) (90 - 117)  ABP: --  ABP(mean): --  RR: --  SpO2: 100% (2020 07:00) (99% - 100%)      Mode: AC/ CMV (Assist Control/ Continuous Mandatory Ventilation)  RR (machine): 20  TV (machine): 400  FiO2: 30  PEEP: 8  PIP: 26    I&O's Summary    2020 07:01  -  2020 07:00  --------------------------------------------------------  IN: 1530 mL / OUT: 1450 mL / NET: 80 mL                            7.5    19.33 )-----------( 382      ( 2020 07:00 )             22.0   07-05    142  |  108  |  77<H>  ----------------------------<  123<H>  3.7   |  23  |  1.25    Ca    7.6<L>      2020 07:00  Phos  5.3     07-05  Mg     2.2     07-05      DVT Prophylaxis:   venodynes,                                                               Advanced Directives: Full Code

## 2020-07-06 NOTE — PROGRESS NOTE ADULT - ASSESSMENT
38 y/o female with h/o SLE, RA, Reynauld syndrome, epilepsy (last seizure 8 yrs ago), IVDA (heroin) was admitted on  for increased weakness. Pt states that her grandmother  approx. 3 months ago and for the past almost 2 months she has been snorting two bags of heroin a day. Pt states her heroin use is partially due to depression and anxiety since gma passed away however also due to financial reasons as can no longer afford oxycodone which she had been taking for her lupus/RA chronic pain as Rx. Pt states she has wounds all over her body from falls, ulcers from previous attempted injection sites, which are not healing and bleed at times. Pt describes weakness as fatigue, endorses sob with exertion, weight loss due to decreased appetite, palpitations. Denies fever, cough. In ER she was found with low grade fever and received vancomycin IV and zosyn.     1. Sepsis with MRSA. Skin ulcers ?track marks. Possible SBE. Soft tissue portal vein/ liver mass ?cause Probable arms cellulitis. UTI with MRSA. Chronic renal failure. Acute respiratory failure.   -poor IV access: has TLC, but no peripheral lines can be obtained  -leukocytosis  -BC reviewed  -repeat BC could not be obtained so far; to try again  -on vancomycin # 11   -tolerating abx well so far; no side effects noted  -repeat vancomycin level is not available; renal function is improved  -repeat BC x 2  -repeat vancomycin level  -cardiology evaluation appreciated, plan for GRAHAM  -continue IV abx coverage  -monitor temps  -f/u CBC  -supportive care  2. Other issues:   -care per medicine

## 2020-07-07 LAB
ALBUMIN SERPL ELPH-MCNC: 1.3 G/DL — LOW (ref 3.3–5)
ALP SERPL-CCNC: 306 U/L — HIGH (ref 40–120)
ALT FLD-CCNC: 31 U/L — SIGNIFICANT CHANGE UP (ref 12–78)
ANION GAP SERPL CALC-SCNC: 9 MMOL/L — SIGNIFICANT CHANGE UP (ref 5–17)
AST SERPL-CCNC: 59 U/L — HIGH (ref 15–37)
BILIRUB SERPL-MCNC: 0.5 MG/DL — SIGNIFICANT CHANGE UP (ref 0.2–1.2)
BUN SERPL-MCNC: 58 MG/DL — HIGH (ref 7–23)
CALCIUM SERPL-MCNC: 7.6 MG/DL — LOW (ref 8.5–10.1)
CHLORIDE SERPL-SCNC: 115 MMOL/L — HIGH (ref 96–108)
CO2 SERPL-SCNC: 24 MMOL/L — SIGNIFICANT CHANGE UP (ref 22–31)
CREAT SERPL-MCNC: 1.01 MG/DL — SIGNIFICANT CHANGE UP (ref 0.5–1.3)
GLUCOSE SERPL-MCNC: 87 MG/DL — SIGNIFICANT CHANGE UP (ref 70–99)
HCG UR QL: NEGATIVE — SIGNIFICANT CHANGE UP
HCT VFR BLD CALC: 22.7 % — LOW (ref 34.5–45)
HGB BLD-MCNC: 7.1 G/DL — LOW (ref 11.5–15.5)
LDH SERPL L TO P-CCNC: 367 U/L — HIGH (ref 84–241)
MAGNESIUM SERPL-MCNC: 2.1 MG/DL — SIGNIFICANT CHANGE UP (ref 1.6–2.6)
MCHC RBC-ENTMCNC: 29.8 PG — SIGNIFICANT CHANGE UP (ref 27–34)
MCHC RBC-ENTMCNC: 31.3 GM/DL — LOW (ref 32–36)
MCV RBC AUTO: 95.4 FL — SIGNIFICANT CHANGE UP (ref 80–100)
PHOSPHATE SERPL-MCNC: 4.3 MG/DL — SIGNIFICANT CHANGE UP (ref 2.5–4.5)
PLATELET # BLD AUTO: 389 K/UL — SIGNIFICANT CHANGE UP (ref 150–400)
POTASSIUM SERPL-MCNC: 4.3 MMOL/L — SIGNIFICANT CHANGE UP (ref 3.5–5.3)
POTASSIUM SERPL-SCNC: 4.3 MMOL/L — SIGNIFICANT CHANGE UP (ref 3.5–5.3)
PROT SERPL-MCNC: 6.3 GM/DL — SIGNIFICANT CHANGE UP (ref 6–8.3)
RBC # BLD: 2.38 M/UL — LOW (ref 3.8–5.2)
RBC # FLD: 19.9 % — HIGH (ref 10.3–14.5)
SODIUM SERPL-SCNC: 148 MMOL/L — HIGH (ref 135–145)
VANCOMYCIN TROUGH SERPL-MCNC: 16.1 UG/ML — SIGNIFICANT CHANGE UP (ref 10–20)
WBC # BLD: 14.15 K/UL — HIGH (ref 3.8–10.5)
WBC # FLD AUTO: 14.15 K/UL — HIGH (ref 3.8–10.5)

## 2020-07-07 PROCEDURE — 99232 SBSQ HOSP IP/OBS MODERATE 35: CPT

## 2020-07-07 PROCEDURE — 99233 SBSQ HOSP IP/OBS HIGH 50: CPT

## 2020-07-07 PROCEDURE — 74018 RADEX ABDOMEN 1 VIEW: CPT | Mod: 26

## 2020-07-07 PROCEDURE — 99291 CRITICAL CARE FIRST HOUR: CPT

## 2020-07-07 RX ADMIN — Medication 5 MILLIGRAM(S): at 21:44

## 2020-07-07 RX ADMIN — OXYCODONE HYDROCHLORIDE 5 MILLIGRAM(S): 5 TABLET ORAL at 02:03

## 2020-07-07 RX ADMIN — CHLORHEXIDINE GLUCONATE 15 MILLILITER(S): 213 SOLUTION TOPICAL at 05:57

## 2020-07-07 RX ADMIN — PANTOPRAZOLE SODIUM 40 MILLIGRAM(S): 20 TABLET, DELAYED RELEASE ORAL at 21:45

## 2020-07-07 RX ADMIN — Medication 650 MILLIGRAM(S): at 21:44

## 2020-07-07 RX ADMIN — PROPOFOL 3.26 MICROGRAM(S)/KG/MIN: 10 INJECTION, EMULSION INTRAVENOUS at 12:34

## 2020-07-07 RX ADMIN — PROPOFOL 3.26 MICROGRAM(S)/KG/MIN: 10 INJECTION, EMULSION INTRAVENOUS at 12:53

## 2020-07-07 RX ADMIN — PROPOFOL 3.26 MICROGRAM(S)/KG/MIN: 10 INJECTION, EMULSION INTRAVENOUS at 18:19

## 2020-07-07 RX ADMIN — Medication 100 MILLIGRAM(S): at 16:28

## 2020-07-07 RX ADMIN — Medication 1 APPLICATION(S): at 14:40

## 2020-07-07 RX ADMIN — Medication 650 MILLIGRAM(S): at 02:04

## 2020-07-07 RX ADMIN — OXYCODONE HYDROCHLORIDE 5 MILLIGRAM(S): 5 TABLET ORAL at 21:45

## 2020-07-07 RX ADMIN — HEPARIN SODIUM 5000 UNIT(S): 5000 INJECTION INTRAVENOUS; SUBCUTANEOUS at 05:57

## 2020-07-07 RX ADMIN — PROPOFOL 3.26 MICROGRAM(S)/KG/MIN: 10 INJECTION, EMULSION INTRAVENOUS at 08:09

## 2020-07-07 RX ADMIN — PANTOPRAZOLE SODIUM 40 MILLIGRAM(S): 20 TABLET, DELAYED RELEASE ORAL at 10:59

## 2020-07-07 RX ADMIN — Medication 2 MILLIGRAM(S): at 03:25

## 2020-07-07 RX ADMIN — HEPARIN SODIUM 5000 UNIT(S): 5000 INJECTION INTRAVENOUS; SUBCUTANEOUS at 21:45

## 2020-07-07 RX ADMIN — CHLORHEXIDINE GLUCONATE 15 MILLILITER(S): 213 SOLUTION TOPICAL at 17:52

## 2020-07-07 RX ADMIN — Medication 40 MILLIEQUIVALENT(S): at 02:04

## 2020-07-07 RX ADMIN — Medication 100 MILLIGRAM(S): at 10:59

## 2020-07-07 RX ADMIN — Medication 1 MILLIGRAM(S): at 16:28

## 2020-07-07 NOTE — PROGRESS NOTE ADULT - PROBLEM SELECTOR PLAN 1
MRSA bacteremia - receiving IV antibiotics; GRAHAM planned then d/emmanuel due to family wishes, now family agreeing to GRAHAM.  Discussed w/ Dr. Hancock (ID), repeat blood ordered, even if neg, high suspicion of endocarditis & will plan for GRAHAM for further evaluation.  Anemia in last 2-3 days requiring transfusion noted w/ possible coffee grounds in OGT 7/4, possible stress gastritis, Hb stable overnight, will plan for GRAHAM tommorrow.

## 2020-07-07 NOTE — PROGRESS NOTE ADULT - SUBJECTIVE AND OBJECTIVE BOX
39 year old woman with a history of Lupus, RA, Raynaud's seizure disorder, anxiety/depression, and drug abuse who presented to the ER on 2020 with complaints of weakness and fever; diagnosed with MRSA bacteremia --  cardiology was consulted on  with request for GRAHAM.  Signed off  after family member making decisions did not want invasive procedures code status was DNR/DNI.  Since then health care proxy has been changed to .  Code status has been changed to full code &  agrees to GRAHAM.    20:  Overnight events noted and discussed with Dr Comer (acute respiratory failure, lactic acidosis).  20: Sedated on vent  : remains sedated on vent.  : remains intubated, sedated.  : intubated but arousable, responds to commands.  Described GRAHAM procedure to pt.  : intubated, arousable. gradual sinus tachy overnight    MEDICATIONS:    MEDICATIONS  (STANDING):  chlorhexidine 0.12% Liquid 15 milliLiter(s) Oral Mucosa every 12 hours  collagenase Ointment 1 Application(s) Topical daily  dextrose 5%. 1000 milliLiter(s) (50 mL/Hr) IV Continuous <Continuous>  dextrose 50% Injectable 12.5 Gram(s) IV Push once  dextrose 50% Injectable 25 Gram(s) IV Push once  dextrose 50% Injectable 25 Gram(s) IV Push once  folic acid 1 milliGRAM(s) Oral daily  heparin   Injectable 5000 Unit(s) SubCutaneous every 8 hours  melatonin 5 milliGRAM(s) Oral at bedtime  pantoprazole  Injectable 40 milliGRAM(s) IV Push every 12 hours  propofol Infusion 10 MICROgram(s)/kG/Min (3.26 mL/Hr) IV Continuous <Continuous>  silver sulfADIAZINE 1% Cream 1 Application(s) Topical daily  thiamine 100 milliGRAM(s) Oral daily  vancomycin  IVPB 500 milliGRAM(s) IV Intermittent daily    MEDICATIONS  (PRN):  acetaminophen   Tablet .. 650 milliGRAM(s) Oral every 4 hours PRN Temp greater or equal to 38C (100.4F), Mild Pain (1 - 3)  dextrose 40% Gel 15 Gram(s) Oral once PRN Blood Glucose LESS THAN 70 milliGRAM(s)/deciLiter  glucagon  Injectable 1 milliGRAM(s) IntraMuscular once PRN Glucose <70 milliGRAM(s)/deciLiter  hydrOXYzine hydrochloride 50 milliGRAM(s) Oral every 4 hours PRN Anxiety  loperamide 2 milliGRAM(s) Oral every 4 hours PRN Loose stool  LORazepam     Tablet 2 milliGRAM(s) Oral every 6 hours PRN Anxiety  oxyCODONE    IR 5 milliGRAM(s) Oral every 4 hours PRN Moderate Pain (4 - 6)      Vital Signs Last 24 Hrs  T(C): 38.4 (2020 05:00), Max: 38.5 (2020 14:00)  T(F): 101.2 (2020 05:00), Max: 101.3 (2020 14:00)  HR: 123 (2020 06:00) (86 - 137)  BP: 121/82 (2020 06:00) (111/63 - 148/97)  BP(mean): 90 (2020 06:00) (74 - 108)  RR: --  SpO2: 95% (2020 05:00) (94% - 100%)Daily     Daily Weight in k.2 (2020 05:00)I&O's Summary    2020 07:01  -  2020 07:00  --------------------------------------------------------  IN: 1552 mL / OUT: 1400 mL / NET: 152 mL        PHYSICAL EXAM:    Constitutional: Semirecumbent in bed on vent, sedated,   appears older than stated age  HEENT: ETT to vent  Pulmonary: Bilateral breath sounds with scattered rhonchi  Cardiovascular: regular rate & rhythm, diffuse edema  Ab: soft, NT,ND, +BS  extremities: multiple ulcers      LABS: All Labs Reviewed:                        7.1    14.15 )-----------( 389      ( 2020 06:28 )             22.7                         7.0    15.95 )-----------( 384      ( 2020 14:40 )             22.0                         7.5    19.33 )-----------( 382      ( 2020 07:00 )             22.0     2020 06:28    148    |  115    |  58     ----------------------------<  87     4.3     |  24     |  1.01   2020 14:30    145    |  112    |  61     ----------------------------<  93     2.7     |  24     |  0.98   2020 07:00    142    |  108    |  77     ----------------------------<  123    3.7     |  23     |  1.25     Ca    7.6        2020 06:28  Ca    7.4        2020 14:30  Ca    7.6        2020 07:00  Phos  4.3       2020 06:28  Phos  4.4       2020 14:30  Phos  5.3       2020 07:00  Mg     2.1       2020 06:28  Mg     2.0       2020 14:30  Mg     2.2       2020 07:00    TPro  6.3    /  Alb  1.3    /  TBili  0.5    /  DBili  x      /  AST  59     /  ALT  31     /  AlkPhos  306    2020 06:28      12 Lead ECG (20 @ 10:51):  Normal sinus rhythm, Low voltage QRS    TTE Echo Complete w/o Contrast w/ Doppler (20 @ 14:01):     The left ventricle is normal in size, wall thickness, wall motion and contractility. Estimated left ventricular ejection fraction is 60 %.   The right atrium is normal in size.   A catheter is noted in the right atrium.   The aortic valve is trileaflet with thin pliable leaflets.   The mitral valve leaflets appear thickened.   Trace mitral regurgitation is present.   The tricuspid valve leaflets appear mildly thickened and/or calcified, but open well.   Trace tricuspid valve regurgitation is present.   Trace pulmonic valvular regurgitation is present.    US Abdomen Complete (20 @ 17:46):     Avascular and mildly echogenic soft tissue mass surrounding portal vein, biliary ducts, and RIGHT hepatic artery of indeterminate etiology. FOLLOW-UP CONTRAST CT SCAN RECOMMENDED.,  Status post cholecystectomy.Mild splenomegaly.Fatty infiltration of liver.    Xray Chest 1 View-PORTABLE IMMEDIATE (20 @ 22:54):  1. Right IJ central venous catheter.  2. Nonspecific vertical, linear density in the peripheral right upper lobe, likely confluence of shadows/artifact or on the patient.  3. Otherwise, no acute cardiopulmonary disease findings.

## 2020-07-07 NOTE — PROGRESS NOTE ADULT - ASSESSMENT
40yo female with sepsis, drug abuse, anemia and some intermittent cg emesis.   Trend h/h, transfuse if needed.   PPI.   Plan for EGD today with Dr. Cisneros around 3pm.   Eventual GRAHAM pending above.   Discussed with CCU, will need to get consent from HCP.

## 2020-07-07 NOTE — PROGRESS NOTE ADULT - SUBJECTIVE AND OBJECTIVE BOX
Patient is a 39y old  Female who presents with a chief complaint of Cellulitis with sepsis, symptomatic anemia. (2020 08:18)    HPI:  Pt is a 38 yo female with a pmh/o lupus, RA, reynards, epilepsy (last seizure 8 yrs ago), IVDA (clean for 4 years), who presented to ED today due to weakness. Pt states that her grandmother  approx. 3 months ago and for the past almost 2 months she has been snorting two bags of heroin a day. Pt states her heroin use is partially due to depression and anxiety since gma passed away however also due to financial reasons as can no longer afford oxycodone which she had been taking for her lupus/RA chronic pain as Rx. Pt states she has wounds all over her body from falls, ulcers from previous attempted injection sites, which are not healing and bleed (approx 2 tbs (RUE wound)) daily. Pt describes weakness as fatigue, endorses sob with exertion, weight loss due to decreased appetite from depression and drug use, palpitations. Denies n/v/d, constipation, HA, hematemesis, hematochezia, dark stools, abd pain, cp, fevers, cough, leg swelling.     In ED pt found to have lactate 3.9 with Hgb of 5.5, , T 100.4. (2020 01:21)    2020-  Awake.   Patient still intubated with intermittent cg emesis per CCU.   Pt able to squeeze hand when asked.     PAST MEDICAL & SURGICAL HISTORY:  Smoker  Heroin abuse  H/O Raynaud's syndrome  Rheumatoid arthritis  Lupus  Gall bladder stones  H/O appendicitis  H/O tubal ligation    MEDICATIONS  (STANDING):  chlorhexidine 0.12% Liquid 15 milliLiter(s) Oral Mucosa every 12 hours  collagenase Ointment 1 Application(s) Topical daily  dextrose 5%. 1000 milliLiter(s) (50 mL/Hr) IV Continuous <Continuous>  dextrose 50% Injectable 12.5 Gram(s) IV Push once  dextrose 50% Injectable 25 Gram(s) IV Push once  dextrose 50% Injectable 25 Gram(s) IV Push once  folic acid 1 milliGRAM(s) Oral daily  heparin   Injectable 5000 Unit(s) SubCutaneous every 8 hours  melatonin 5 milliGRAM(s) Oral at bedtime  pantoprazole  Injectable 40 milliGRAM(s) IV Push every 12 hours  propofol Infusion 10 MICROgram(s)/kG/Min (3.26 mL/Hr) IV Continuous <Continuous>  silver sulfADIAZINE 1% Cream 1 Application(s) Topical daily  thiamine 100 milliGRAM(s) Oral daily  vancomycin  IVPB 500 milliGRAM(s) IV Intermittent daily    MEDICATIONS  (PRN):  acetaminophen   Tablet .. 650 milliGRAM(s) Oral every 4 hours PRN Temp greater or equal to 38C (100.4F), Mild Pain (1 - 3)  dextrose 40% Gel 15 Gram(s) Oral once PRN Blood Glucose LESS THAN 70 milliGRAM(s)/deciLiter  glucagon  Injectable 1 milliGRAM(s) IntraMuscular once PRN Glucose <70 milliGRAM(s)/deciLiter  hydrOXYzine hydrochloride 50 milliGRAM(s) Oral every 4 hours PRN Anxiety  loperamide 2 milliGRAM(s) Oral every 4 hours PRN Loose stool  LORazepam     Tablet 2 milliGRAM(s) Oral every 6 hours PRN Anxiety  oxyCODONE    IR 5 milliGRAM(s) Oral every 4 hours PRN Moderate Pain (4 - 6)    Allergies    morphine (Unknown)    Intolerances      SOCIAL HISTORY:  FAMILY HISTORY:  No pertinent family history in first degree relatives: cannot recall family history at this time    REVIEW OF SYSTEMS:  Unable to obtain.     Vital Signs Last 24 Hrs  T(C): 37.8 (2020 09:35), Max: 38.5 (2020 14:00)  T(F): 100.1 (2020 09:35), Max: 101.3 (2020 14:00)  HR: 111 (2020 09:00) (86 - 137)  BP: 126/81 (2020 09:00) (111/63 - 148/97)  BP(mean): 91 (2020 09:00) (74 - 108)  RR: --  SpO2: 97% (2020 09:00) (92% - 100%)    PHYSICAL EXAM:  Constitutional: Middle aged female with multiple medical issues in NAD.   Respiratory: Intubated, CTAB  Cardiovascular: S1 and S2, RRR, no M/G/R  Gastrointestinal: BS+, soft, NT/ND, ngt in place.     LABS:                        7.1    14.15 )-----------( 389      ( 2020 06:28 )             22.7     07-07    148<H>  |  115<H>  |  58<H>  ----------------------------<  87  4.3   |  24  |  1.01    Ca    7.6<L>      2020 06:28  Phos  4.3     07-  Mg     2.1     -    TPro  6.3  /  Alb  1.3<L>  /  TBili  0.5  /  DBili  x   /  AST  59<H>  /  ALT  31  /  AlkPhos  306<H>        LIVER FUNCTIONS - ( 2020 06:28 )  Alb: 1.3 g/dL / Pro: 6.3 gm/dL / ALK PHOS: 306 U/L / ALT: 31 U/L / AST: 59 U/L / GGT: x             RADIOLOGY & ADDITIONAL STUDIES:

## 2020-07-07 NOTE — PROGRESS NOTE ADULT - ASSESSMENT
38 y/o female with hx. RA,   epilepsy (last seizure 8 yrs ago) and IVDA admitted with MRSA sepsis and septic shock-   Thrombocytopenia--recovered  Anemia--likely stress gastritis , will transfuse with restrictive strategy  NATALIA has recovered  Acute type1 resp failure with criteria for Mod ARDS, cxr improving but still not weaning, may need Trach  failed weaning trial this am   TM Encephalopathy--critical illness neuropathy   Chronically ill given Albumin around 1  On vanco Day 12 for bacteremia , repeat blood cultures sent  needs GRAHAM but may need endoscopy first will consult GI  off pressors, awake off sedation  High risk for acute decompensation and deterioration including death.  Critically ill  Full Resuscitation--[place] Mane is appointed HCP by pt

## 2020-07-07 NOTE — PROGRESS NOTE ADULT - ASSESSMENT
38 yo female with hx of SLE, RA, ( formerly on MTX up to year ago - followed by Dr Del Rosario) , hx of IVDA and now heroin use  now presengint with weakness and found to be MRSA bacteremia and renal eval called for elevated creatinine    NATALIA    Cr improved   -  keep SBP > 110 for renal perfusion as able       MRSA bacteremia with septic shock hypoxic resp failure    IV abx /ID    Vent per ICU level of care     d/w staff

## 2020-07-07 NOTE — PROGRESS NOTE ADULT - SUBJECTIVE AND OBJECTIVE BOX
Events Overnight: Remains Febrile to 101,  repeat blood cultures negative    HPI:     40 y/o female with lupus, RA, epilepsy (last seizure 8 yrs ago), IVDA  , who presented to ED  due to weakness. Pt states that her grandmother  approx. 3 months ago and for the past almost 2 months she has been snorting two bags of heroin a day.  Patient developed septic shock , MRSA bacteremia on , Patient was intubated on .  Developed acute renal failure, creatinine has improved, bp is better  anemia, had coffee groungs, possible UGI bleed.   Tachypnea with weaning, infiltrates improving on cxr    ROS - cant assess    MEDICATIONS  (STANDING):  chlorhexidine 0.12% Liquid 15 milliLiter(s) Oral Mucosa every 12 hours  collagenase Ointment 1 Application(s) Topical daily  dextrose 5%. 1000 milliLiter(s) (50 mL/Hr) IV Continuous <Continuous>  dextrose 50% Injectable 12.5 Gram(s) IV Push once  dextrose 50% Injectable 25 Gram(s) IV Push once  dextrose 50% Injectable 25 Gram(s) IV Push once  folic acid 1 milliGRAM(s) Oral daily  heparin   Injectable 5000 Unit(s) SubCutaneous every 8 hours  melatonin 5 milliGRAM(s) Oral at bedtime  pantoprazole  Injectable 40 milliGRAM(s) IV Push every 12 hours  propofol Infusion 10 MICROgram(s)/kG/Min (3.26 mL/Hr) IV Continuous <Continuous>  silver sulfADIAZINE 1% Cream 1 Application(s) Topical daily  thiamine 100 milliGRAM(s) Oral daily  vancomycin  IVPB 500 milliGRAM(s) IV Intermittent daily    MEDICATIONS  (PRN):  acetaminophen   Tablet .. 650 milliGRAM(s) Oral every 4 hours PRN Temp greater or equal to 38C (100.4F), Mild Pain (1 - 3)  dextrose 40% Gel 15 Gram(s) Oral once PRN Blood Glucose LESS THAN 70 milliGRAM(s)/deciLiter  glucagon  Injectable 1 milliGRAM(s) IntraMuscular once PRN Glucose <70 milliGRAM(s)/deciLiter  hydrOXYzine hydrochloride 50 milliGRAM(s) Oral every 4 hours PRN Anxiety  loperamide 2 milliGRAM(s) Oral every 4 hours PRN Loose stool  LORazepam     Tablet 2 milliGRAM(s) Oral every 6 hours PRN Anxiety  oxyCODONE    IR 5 milliGRAM(s) Oral every 4 hours PRN Moderate Pain (4 - 6)    ICU Vital Signs Last 24 Hrs  T(C): 38.4 (2020 05:00), Max: 38.5 (2020 14:00)  T(F): 101.2 (2020 05:00), Max: 101.3 (2020 14:00)  HR: 126 (2020 08:00) (86 - 137)  BP: 128/85 (2020 08:00) (111/63 - 148/97)  BP(mean): 94 (2020 08:00) (74 - 108)  ABP: --  ABP(mean): --  RR: --  SpO2: 92% (2020 08:00) (92% - 100%)      Mode: AC/ CMV (Assist Control/ Continuous Mandatory Ventilation)  RR (machine): 20  TV (machine): 400  FiO2: 30  PEEP: 5  PS: 5  ITime: 1  MAP: 13  PIP: 21      I&O's Summary    2020 07:01  -  2020 07:00  --------------------------------------------------------  IN: 1552 mL / OUT: 1400 mL / NET: 152 mL                          7.1    14.15 )-----------( 389      ( 2020 06:28 )             22.7       07-07    148<H>  |  115<H>  |  58<H>  ----------------------------<  87  4.3   |  24  |  1.01    Ca    7.6<L>      2020 06:28  Phos  4.3     07-07  Mg     2.1     07-07    TPro  6.3  /  Alb  1.3<L>  /  TBili  0.5  /  DBili  x   /  AST  59<H>  /  ALT  31  /  AlkPhos  306<H>  07-07      DVT Prophylaxis:     Heparin subq                                                            Advanced Directives: Full Code

## 2020-07-08 LAB
-  CANDIDA ALBICANS: SIGNIFICANT CHANGE UP
-  CANDIDA GLABRATA: SIGNIFICANT CHANGE UP
-  CANDIDA KRUSEI: SIGNIFICANT CHANGE UP
-  CANDIDA PARAPSILOSIS: SIGNIFICANT CHANGE UP
-  CANDIDA TROPICALIS: SIGNIFICANT CHANGE UP
-  COAGULASE NEGATIVE STAPHYLOCOCCUS: SIGNIFICANT CHANGE UP
-  K. PNEUMONIAE GROUP: SIGNIFICANT CHANGE UP
-  KPC RESISTANCE GENE: SIGNIFICANT CHANGE UP
-  STREPTOCOCCUS SP. (NOT GRP A, B OR S PNEUMONIAE): SIGNIFICANT CHANGE UP
A BAUMANNII DNA SPEC QL NAA+PROBE: SIGNIFICANT CHANGE UP
ANION GAP SERPL CALC-SCNC: 10 MMOL/L — SIGNIFICANT CHANGE UP (ref 5–17)
BUN SERPL-MCNC: 49 MG/DL — HIGH (ref 7–23)
CALCIUM SERPL-MCNC: 7.6 MG/DL — LOW (ref 8.5–10.1)
CHLORIDE SERPL-SCNC: 118 MMOL/L — HIGH (ref 96–108)
CO2 SERPL-SCNC: 23 MMOL/L — SIGNIFICANT CHANGE UP (ref 22–31)
CREAT SERPL-MCNC: 0.98 MG/DL — SIGNIFICANT CHANGE UP (ref 0.5–1.3)
E CLOAC COMP DNA BLD POS QL NAA+PROBE: SIGNIFICANT CHANGE UP
E COLI DNA BLD POS QL NAA+NON-PROBE: SIGNIFICANT CHANGE UP
ENTEROCOC DNA BLD POS QL NAA+NON-PROBE: SIGNIFICANT CHANGE UP
ENTEROCOC DNA BLD POS QL NAA+NON-PROBE: SIGNIFICANT CHANGE UP
GLUCOSE SERPL-MCNC: 80 MG/DL — SIGNIFICANT CHANGE UP (ref 70–99)
GP B STREP DNA BLD POS QL NAA+NON-PROBE: SIGNIFICANT CHANGE UP
GRAM STN FLD: SIGNIFICANT CHANGE UP
HAEM INFLU DNA BLD POS QL NAA+NON-PROBE: SIGNIFICANT CHANGE UP
HCT VFR BLD CALC: 21.9 % — LOW (ref 34.5–45)
HGB BLD-MCNC: 6.8 G/DL — CRITICAL LOW (ref 11.5–15.5)
K OXYTOCA DNA BLD POS QL NAA+NON-PROBE: SIGNIFICANT CHANGE UP
L MONOCYTOG DNA BLD POS QL NAA+NON-PROBE: SIGNIFICANT CHANGE UP
MCHC RBC-ENTMCNC: 30 PG — SIGNIFICANT CHANGE UP (ref 27–34)
MCHC RBC-ENTMCNC: 31.1 GM/DL — LOW (ref 32–36)
MCV RBC AUTO: 96.5 FL — SIGNIFICANT CHANGE UP (ref 80–100)
METHOD TYPE: SIGNIFICANT CHANGE UP
MRSA SPEC QL CULT: SIGNIFICANT CHANGE UP
MSSA DNA SPEC QL NAA+PROBE: SIGNIFICANT CHANGE UP
N MEN ISLT CULT: SIGNIFICANT CHANGE UP
P AERUGINOSA DNA BLD POS NAA+NON-PROBE: SIGNIFICANT CHANGE UP
PLATELET # BLD AUTO: 367 K/UL — SIGNIFICANT CHANGE UP (ref 150–400)
POTASSIUM SERPL-MCNC: 3.8 MMOL/L — SIGNIFICANT CHANGE UP (ref 3.5–5.3)
POTASSIUM SERPL-SCNC: 3.8 MMOL/L — SIGNIFICANT CHANGE UP (ref 3.5–5.3)
RBC # BLD: 2.27 M/UL — LOW (ref 3.8–5.2)
RBC # FLD: 19.6 % — HIGH (ref 10.3–14.5)
S MARCESCENS DNA BLD POS NAA+NON-PROBE: SIGNIFICANT CHANGE UP
S PNEUM DNA BLD POS QL NAA+NON-PROBE: SIGNIFICANT CHANGE UP
S PYO DNA BLD POS QL NAA+NON-PROBE: SIGNIFICANT CHANGE UP
SODIUM SERPL-SCNC: 151 MMOL/L — HIGH (ref 135–145)
WBC # BLD: 13.05 K/UL — HIGH (ref 3.8–10.5)
WBC # FLD AUTO: 13.05 K/UL — HIGH (ref 3.8–10.5)

## 2020-07-08 PROCEDURE — 93325 DOPPLER ECHO COLOR FLOW MAPG: CPT | Mod: 26

## 2020-07-08 PROCEDURE — 93312 ECHO TRANSESOPHAGEAL: CPT | Mod: 26

## 2020-07-08 PROCEDURE — 99233 SBSQ HOSP IP/OBS HIGH 50: CPT | Mod: 25

## 2020-07-08 PROCEDURE — 71045 X-RAY EXAM CHEST 1 VIEW: CPT | Mod: 26

## 2020-07-08 PROCEDURE — 99291 CRITICAL CARE FIRST HOUR: CPT

## 2020-07-08 PROCEDURE — 93320 DOPPLER ECHO COMPLETE: CPT | Mod: 26

## 2020-07-08 RX ORDER — FLUCONAZOLE 150 MG/1
400 TABLET ORAL EVERY 24 HOURS
Refills: 0 | Status: COMPLETED | OUTPATIENT
Start: 2020-07-08 | End: 2020-07-22

## 2020-07-08 RX ADMIN — CHLORHEXIDINE GLUCONATE 15 MILLILITER(S): 213 SOLUTION TOPICAL at 17:13

## 2020-07-08 RX ADMIN — Medication 650 MILLIGRAM(S): at 05:33

## 2020-07-08 RX ADMIN — HEPARIN SODIUM 5000 UNIT(S): 5000 INJECTION INTRAVENOUS; SUBCUTANEOUS at 05:33

## 2020-07-08 RX ADMIN — Medication 100 MILLIGRAM(S): at 16:52

## 2020-07-08 RX ADMIN — Medication 1 APPLICATION(S): at 16:50

## 2020-07-08 RX ADMIN — Medication 1 MILLIGRAM(S): at 16:52

## 2020-07-08 RX ADMIN — Medication 650 MILLIGRAM(S): at 21:46

## 2020-07-08 RX ADMIN — FLUCONAZOLE 100 MILLIGRAM(S): 150 TABLET ORAL at 13:21

## 2020-07-08 RX ADMIN — Medication 100 MILLIGRAM(S): at 10:14

## 2020-07-08 RX ADMIN — PANTOPRAZOLE SODIUM 40 MILLIGRAM(S): 20 TABLET, DELAYED RELEASE ORAL at 21:44

## 2020-07-08 RX ADMIN — Medication 5 MILLIGRAM(S): at 21:46

## 2020-07-08 RX ADMIN — OXYCODONE HYDROCHLORIDE 5 MILLIGRAM(S): 5 TABLET ORAL at 21:46

## 2020-07-08 RX ADMIN — OXYCODONE HYDROCHLORIDE 5 MILLIGRAM(S): 5 TABLET ORAL at 14:41

## 2020-07-08 RX ADMIN — HEPARIN SODIUM 5000 UNIT(S): 5000 INJECTION INTRAVENOUS; SUBCUTANEOUS at 21:47

## 2020-07-08 RX ADMIN — PANTOPRAZOLE SODIUM 40 MILLIGRAM(S): 20 TABLET, DELAYED RELEASE ORAL at 10:14

## 2020-07-08 RX ADMIN — OXYCODONE HYDROCHLORIDE 5 MILLIGRAM(S): 5 TABLET ORAL at 05:33

## 2020-07-08 RX ADMIN — CHLORHEXIDINE GLUCONATE 15 MILLILITER(S): 213 SOLUTION TOPICAL at 05:33

## 2020-07-08 RX ADMIN — PROPOFOL 3.26 MICROGRAM(S)/KG/MIN: 10 INJECTION, EMULSION INTRAVENOUS at 05:34

## 2020-07-08 RX ADMIN — Medication 2 MILLIGRAM(S): at 14:41

## 2020-07-08 NOTE — PROGRESS NOTE ADULT - SUBJECTIVE AND OBJECTIVE BOX
39 year old woman with a history of Lupus, RA, Raynaud's seizure disorder, anxiety/depression, and drug abuse who presented to the ER on 2020 with complaints of weakness and fever; diagnosed with MRSA bacteremia --  cardiology was consulted on  with request for GRAHAM.  Signed off  after family member making decisions did not want invasive procedures code status was DNR/DNI.  Since then health care proxy has been changed to .  Code status has been changed to full code &  agrees to GRAHAM.    20:  Overnight events noted and discussed with Dr Comer (acute respiratory failure, lactic acidosis).  20: Sedated on vent  : remains sedated on vent.  : remains intubated, sedated.  : intubated but arousable, responds to commands.  Described GRAHAM procedure to pt.  : intubated, arousable. gradual sinus tachy overnight    PCP:    REQUESTING PHYSICIAN:    REASON FOR CONSULT:    CHIEF COMPLAINT:    HPI:  Pt is a 40 yo female with a pmh/o lupus, RA, reynards, epilepsy (last seizure 8 yrs ago), IVDA (clean for 4 years), who presented to ED today due to weakness. Pt states that her grandmother  approx. 3 months ago and for the past almost 2 months she has been snorting two bags of heroin a day. Pt states her heroin use is partially due to depression and anxiety since gma passed away however also due to financial reasons as can no longer afford oxycodone which she had been taking for her lupus/RA chronic pain as Rx. Pt states she has wounds all over her body from falls, ulcers from previous attempted injection sites, which are not healing and bleed (approx 2 tbs (RUE wound)) daily. Pt describes weakness as fatigue, endorses sob with exertion, weight loss due to decreased appetite from depression and drug use, palpitations. Denies n/v/d, constipation, HA, hematemesis, hematochezia, dark stools, abd pain, cp, fevers, cough, leg swelling.     In ED pt found to have lactate 3.9 with Hgb of 5.5, , T 100.4. (2020 01:21)      PAST MEDICAL & SURGICAL HISTORY:  Smoker  Heroin abuse  H/O Raynaud's syndrome  Rheumatoid arthritis  Lupus  Gall bladder stones  H/O appendicitis  H/O tubal ligation      SOCIAL HISTORY:    FAMILY HISTORY:  No pertinent family history in first degree relatives: cannot recall family history at this time      ALLERGIES:  Allergies    morphine (Unknown)    Intolerances        MEDICATIONS:    MEDICATIONS  (STANDING):  chlorhexidine 0.12% Liquid 15 milliLiter(s) Oral Mucosa every 12 hours  collagenase Ointment 1 Application(s) Topical daily  dextrose 5%. 1000 milliLiter(s) (50 mL/Hr) IV Continuous <Continuous>  dextrose 50% Injectable 12.5 Gram(s) IV Push once  dextrose 50% Injectable 25 Gram(s) IV Push once  dextrose 50% Injectable 25 Gram(s) IV Push once  fluconAZOLE IVPB 400 milliGRAM(s) IV Intermittent every 24 hours  folic acid 1 milliGRAM(s) Oral daily  heparin   Injectable 5000 Unit(s) SubCutaneous every 8 hours  melatonin 5 milliGRAM(s) Oral at bedtime  pantoprazole  Injectable 40 milliGRAM(s) IV Push every 12 hours  propofol Infusion 10 MICROgram(s)/kG/Min (3.26 mL/Hr) IV Continuous <Continuous>  silver sulfADIAZINE 1% Cream 1 Application(s) Topical daily  thiamine 100 milliGRAM(s) Oral daily  vancomycin  IVPB 500 milliGRAM(s) IV Intermittent daily    MEDICATIONS  (PRN):  acetaminophen   Tablet .. 650 milliGRAM(s) Oral every 4 hours PRN Temp greater or equal to 38C (100.4F), Mild Pain (1 - 3)  dextrose 40% Gel 15 Gram(s) Oral once PRN Blood Glucose LESS THAN 70 milliGRAM(s)/deciLiter  glucagon  Injectable 1 milliGRAM(s) IntraMuscular once PRN Glucose <70 milliGRAM(s)/deciLiter  hydrOXYzine hydrochloride 50 milliGRAM(s) Oral every 4 hours PRN Anxiety  loperamide 2 milliGRAM(s) Oral every 4 hours PRN Loose stool  LORazepam     Tablet 2 milliGRAM(s) Oral every 6 hours PRN Anxiety  oxyCODONE    IR 5 milliGRAM(s) Oral every 4 hours PRN Moderate Pain (4 - 6)      REVIEW OF SYSTEMS:    CONSTITUTIONAL: No weakness, fevers or chills  EYES/ENT: No visual changes;  No vertigo or throat pain   NECK: No pain or stiffness  RESPIRATORY: No cough, wheezing, hemoptysis; No shortness of breath  CARDIOVASCULAR: No chest pain or palpitations  GASTROINTESTINAL: No abdominal or epigastric pain. No nausea, vomiting, or hematemesis; No diarrhea or constipation. No melena or hematochezia.  GENITOURINARY: No dysuria, frequency or hematuria  NEUROLOGICAL: No numbness or weakness  SKIN: No itching, burning, rashes, or lesions   All other review of systems is negative unless indicated above    Vital Signs Last 24 Hrs  T(C): 36.8 (2020 15:30), Max: 37.9 (2020 13:53)  T(F): 98.2 (2020 15:30), Max: 100.3 (2020 13:53)  HR: 110 (2020 15:30) (105 - 128)  BP: 153/93 (2020 15:30) (115/78 - 164/93)  BP(mean): 107 (2020 15:30) (85 - 111)  RR: 22 (2020 13:53) (22 - 22)  SpO2: 100% (2020 15:30) (91% - 100%)Daily     Daily I&O's Summary    2020 07:01  -  2020 07:00  --------------------------------------------------------  IN: 887 mL / OUT: 1450 mL / NET: -563 mL        PHYSICAL EXAM:    Constitutional: NAD, awake and alert, well-developed  HEENT: PERR, EOMI,  No oral cyananosis.  Neck:  supple,  No JVD  Respiratory: Breath sounds are clear bilaterally, No wheezing, rales or rhonchi  Cardiovascular: S1 and S2, regular rate and rhythm, no Murmurs, gallops or rubs  Gastrointestinal: Bowel Sounds present, soft, nontender.   Extremities: No peripheral edema. No clubbing or cyanosis.  Vascular: 2+ peripheral pulses  Neurological: A/O x 3, no focal deficits  Musculoskeletal: no calf tenderness.  Skin: No rashes.      LABS: All Labs Reviewed:                        6.8    13.05 )-----------( 367      ( 2020 10:25 )             21.9                         7.1    14.15 )-----------( 389      ( 2020 06:28 )             22.7                         7.0    15.95 )-----------( 384      ( 2020 14:40 )             22.0     2020 07:45    151    |  118    |  49     ----------------------------<  80     3.8     |  23     |  0.98   2020 06:28    148    |  115    |  58     ----------------------------<  87     4.3     |  24     |  1.01   2020 14:30    145    |  112    |  61     ----------------------------<  93     2.7     |  24     |  0.98     Ca    7.6        2020 07:45  Ca    7.6        2020 06:28  Ca    7.4        2020 14:30  Phos  4.3       2020 06:28  Phos  4.4       2020 14:30  Mg     2.1       2020 06:28  Mg     2.0       2020 14:30    TPro  6.3    /  Alb  1.3    /  TBili  0.5    /  DBili  x      /  AST  59     /  ALT  31     /  AlkPhos  306    2020 06:28          Blood Culture: Organism Blood Culture PCR  Gram Stain Blood -- Gram Stain   Growth in aerobic bottle: Yeast like cells  Specimen Source .Blood Blood-Peripheral  Culture-Blood --            RADIOLOGY/EKG:      ECHO/CARDIAC CATHTERIZATION/STRESS TEST: 39 year old woman with a history of Lupus, RA, Raynaud's seizure disorder, anxiety/depression, and drug abuse who presented to the ER on 6/23/2020 with complaints of weakness and fever; diagnosed with MRSA bacteremia --  cardiology was consulted on 6/26 with request for GRAHAM.  Signed off June 30th after family member making decisions did not want invasive procedures code status was DNR/DNI.  Since then health care proxy has been changed to .  Code status has been changed to full code &  agrees to GRAHAM.    6/27/20:  Overnight events noted and discussed with Dr Comer (acute respiratory failure, lactic acidosis).  6/28/20: Sedated on vent  6/29/'20: remains sedated on vent.  6/30/'20: remains intubated, sedated.  7/6/'20: intubated but arousable, responds to commands.  Described GRAHAM procedure to pt.  7/7/'20: intubated, arousable. gradual sinus tachy overnight  7/8/'20: more alert today, discussed need for GRAHAM.   consent obtained from  yesterday.    MEDICATIONS:    MEDICATIONS  (STANDING):  chlorhexidine 0.12% Liquid 15 milliLiter(s) Oral Mucosa every 12 hours  collagenase Ointment 1 Application(s) Topical daily  dextrose 5%. 1000 milliLiter(s) (50 mL/Hr) IV Continuous <Continuous>  dextrose 50% Injectable 12.5 Gram(s) IV Push once  dextrose 50% Injectable 25 Gram(s) IV Push once  dextrose 50% Injectable 25 Gram(s) IV Push once  fluconAZOLE IVPB 400 milliGRAM(s) IV Intermittent every 24 hours  folic acid 1 milliGRAM(s) Oral daily  heparin   Injectable 5000 Unit(s) SubCutaneous every 8 hours  melatonin 5 milliGRAM(s) Oral at bedtime  pantoprazole  Injectable 40 milliGRAM(s) IV Push every 12 hours  propofol Infusion 10 MICROgram(s)/kG/Min (3.26 mL/Hr) IV Continuous <Continuous>  silver sulfADIAZINE 1% Cream 1 Application(s) Topical daily  thiamine 100 milliGRAM(s) Oral daily  vancomycin  IVPB 500 milliGRAM(s) IV Intermittent daily    MEDICATIONS  (PRN):  acetaminophen   Tablet .. 650 milliGRAM(s) Oral every 4 hours PRN Temp greater or equal to 38C (100.4F), Mild Pain (1 - 3)  dextrose 40% Gel 15 Gram(s) Oral once PRN Blood Glucose LESS THAN 70 milliGRAM(s)/deciLiter  glucagon  Injectable 1 milliGRAM(s) IntraMuscular once PRN Glucose <70 milliGRAM(s)/deciLiter  hydrOXYzine hydrochloride 50 milliGRAM(s) Oral every 4 hours PRN Anxiety  loperamide 2 milliGRAM(s) Oral every 4 hours PRN Loose stool  LORazepam     Tablet 2 milliGRAM(s) Oral every 6 hours PRN Anxiety  oxyCODONE    IR 5 milliGRAM(s) Oral every 4 hours PRN Moderate Pain (4 - 6)      Vital Signs Last 24 Hrs  T(C): 36.8 (08 Jul 2020 15:30), Max: 37.9 (08 Jul 2020 13:53)  T(F): 98.2 (08 Jul 2020 15:30), Max: 100.3 (08 Jul 2020 13:53)  HR: 110 (08 Jul 2020 15:30) (105 - 128)  BP: 153/93 (08 Jul 2020 15:30) (115/78 - 164/93)  BP(mean): 107 (08 Jul 2020 15:30) (85 - 111)  RR: 22 (08 Jul 2020 13:53) (22 - 22)  SpO2: 100% (08 Jul 2020 15:30) (91% - 100%)Daily     Daily I&O's Summary    07 Jul 2020 07:01  -  08 Jul 2020 07:00  --------------------------------------------------------  IN: 887 mL / OUT: 1450 mL / NET: -563 mL    PHYSICAL EXAM:    Constitutional: Semirecumbent in bed on vent, sedated,   appears older than stated age  HEENT: ETT to vent  Pulmonary: Bilateral breath sounds with scattered rhonchi  Cardiovascular: regular rate & rhythm, diffuse edema  Ab: soft, NT,ND, +BS  extremities: multiple ulcers    LABS: All Labs Reviewed:                        6.8    13.05 )-----------( 367      ( 08 Jul 2020 10:25 )             21.9                         7.1    14.15 )-----------( 389      ( 07 Jul 2020 06:28 )             22.7                         7.0    15.95 )-----------( 384      ( 06 Jul 2020 14:40 )             22.0     08 Jul 2020 07:45    151    |  118    |  49     ----------------------------<  80     3.8     |  23     |  0.98   07 Jul 2020 06:28    148    |  115    |  58     ----------------------------<  87     4.3     |  24     |  1.01   06 Jul 2020 14:30    145    |  112    |  61     ----------------------------<  93     2.7     |  24     |  0.98     Ca    7.6        08 Jul 2020 07:45  Ca    7.6        07 Jul 2020 06:28  Ca    7.4        06 Jul 2020 14:30  Phos  4.3       07 Jul 2020 06:28  Phos  4.4       06 Jul 2020 14:30  Mg     2.1       07 Jul 2020 06:28  Mg     2.0       06 Jul 2020 14:30    TPro  6.3    /  Alb  1.3    /  TBili  0.5    /  DBili  x      /  AST  59     /  ALT  31     /  AlkPhos  306    07 Jul 2020 06:28      12 Lead ECG (06.24.20 @ 10:51):  Normal sinus rhythm, Low voltage QRS    TTE Echo Complete w/o Contrast w/ Doppler (06.24.20 @ 14:01):     The left ventricle is normal in size, wall thickness, wall motion and contractility. Estimated left ventricular ejection fraction is 60 %.   The right atrium is normal in size.   A catheter is noted in the right atrium.   The aortic valve is trileaflet with thin pliable leaflets.   The mitral valve leaflets appear thickened.   Trace mitral regurgitation is present.   The tricuspid valve leaflets appear mildly thickened and/or calcified, but open well.   Trace tricuspid valve regurgitation is present.   Trace pulmonic valvular regurgitation is present.    US Abdomen Complete (06.25.20 @ 17:46):     Avascular and mildly echogenic soft tissue mass surrounding portal vein, biliary ducts, and RIGHT hepatic artery of indeterminate etiology. FOLLOW-UP CONTRAST CT SCAN RECOMMENDED.,  Status post cholecystectomy.Mild splenomegaly.Fatty infiltration of liver.    Xray Chest 1 View-PORTABLE IMMEDIATE (06.23.20 @ 22:54):  1. Right IJ central venous catheter.  2. Nonspecific vertical, linear density in the peripheral right upper lobe, likely confluence of shadows/artifact or on the patient.  3. Otherwise, no acute cardiopulmonary disease findings.

## 2020-07-08 NOTE — PROVIDER CONTACT NOTE (CRITICAL VALUE NOTIFICATION) - TEST AND RESULT REPORTED:
Lactate 4.5
Vanco trough 29.8
H&H 6.8/21.9
Hem 7 & K 2.7
K+ 2.8
Lactate 4.6
PH 6.9
PH 7.01
Urine Cx +MRSA
h&h 5.5, 17.1 lactate-3.0
lactate 4.5
lactate 4.6
lactate level 4.4
lactate-4.2
Hgb 6.0, Hct 17.6

## 2020-07-08 NOTE — PROGRESS NOTE ADULT - ASSESSMENT
38 y/o female with   epilepsy (last seizure 8 yrs ago) and IVDA admitted with MRSA sepsis and septic shock-   Thrombocytopenia--recovered  Anemia--likely stress gastritis , will transfuse with restrictive strategy hgb now 7.5  NATALIA has recovered  Acute type1 resp failure with criteria for Mod ARDS, cxr markedly improved will give weaning trial  failed weaning trial this am   TM Encephalopathy--critical illness neuropathy improving   Chronically ill given Albumin around 1  On vanco Day 13 for bacteremia , repeat blood cultures negative today GRAHAM today  off pressors, awake off sedation  IF fails weaning may need trach  High risk for acute decompensation and deterioration including death.  Critically ill

## 2020-07-08 NOTE — PROVIDER CONTACT NOTE (CRITICAL VALUE NOTIFICATION) - NAME OF MD/NP/PA/DO NOTIFIED:
Dr. Astorga ICU resident
CODY Garvey; No further orders
Call put out to Dr. Hancock; awaiting phone call back
CODY Garvey
CODY Garvey
CODY Shin
DR Brown
Dr monteiro
Dr. Adamson
Dr. Chiu
Dr. Rayo
Lo
Mollymed
saul welch
saul welch

## 2020-07-08 NOTE — PROGRESS NOTE ADULT - PROBLEM SELECTOR PLAN 3
Echo Jun 27th w/ "new septal & inferior wall motion abnormalities" w/ preserved EF per ICU note Jun 28th, however report does not mention this.  Will reevaluate during GRAHAM study.
Echo Jun 27th w/ "new septal & inferior wall motion abnormalities" w/ preserved EF per ICU note Jun 28th, however report does not mention this.  Will reevaluate during GRAHAM study.
Apparently new segmental LV dysfunction on echocardiogram in setting of critical illness; ECG this AM similar to prior tracing with Q wave in V2; repeat troponin.
Apparently new segmental LV dysfunction on echocardiogram in setting of critical illness; continue supportive care.
Echo Jun 27th w/ "new septal & inferior wall motion abnormalities" w/ preserved EF per ICU note Jun 28th (no report available).  cont. supportive care, if prognosis improves will consider further evaluation.
Echo Jun 27th w/ "new septal & inferior wall motion abnormalities" w/ preserved EF per ICU note Jun 28th (no report available).  cont. supportive care.
Echo Jun 27th w/ "new septal & inferior wall motion abnormalities" w/ preserved EF per ICU note Jun 28th, however report does not mention this. GRAHAM today w/ normal EF & no RWMA.

## 2020-07-08 NOTE — CHART NOTE - NSCHARTNOTEFT_GEN_A_CORE
Patient with blood culture positive for yeast.  Patient previously had yeast in sputum.  patient needed central line has she was on multiple meds  and no access.  will need to d/c central line, got extended dwell in.  was started on fluconazole by ID

## 2020-07-08 NOTE — PROVIDER CONTACT NOTE (CRITICAL VALUE NOTIFICATION) - PERSON GIVING RESULT:
Dalia
Elly Dela Cruz
Elvis
Elvis
Hussein/scott
Ms. Elkins
Ms. Pena
lab
lab
orsana/scott
sanju coats
scott coats
Blanca

## 2020-07-08 NOTE — PROGRESS NOTE ADULT - PROBLEM SELECTOR PROBLEM 1
MRSA bacteremia

## 2020-07-08 NOTE — PROGRESS NOTE ADULT - SUBJECTIVE AND OBJECTIVE BOX
GRAHAM preliminary report:    No evidence of endocarditis on any of the cardiac valves or structures.  Normal EF (65%) w/ no regional wall motion abnormalities.  Small pericardial effusion posteriorly.  Full report to follow.

## 2020-07-08 NOTE — PROGRESS NOTE ADULT - ASSESSMENT
40 yo female with hx of SLE, RA, ( formerly on MTX up to year ago - followed by Dr Del Rosario) , hx of IVDA and now heroin use  now presengint with weakness and found to be MRSA bacteremia and renal eval called for elevated creatinine    NATALIA    Cr improved   -  keep SBP > 110 for renal perfusion as able     Hypernatremia with large UOP    pt is NPO today    resume free water via NGT when able - increase rate to 60/hr free water       MRSA bacteremia with septic shock hypoxic resp failure    IV abx /ID    Vent per ICU level of care    GRAHAM today     d/w CCU staff

## 2020-07-08 NOTE — PROGRESS NOTE ADULT - SUBJECTIVE AND OBJECTIVE BOX
Events Overnight; fevers better, had upper endoscopy yesterday now acute source of bleeding, tachypneic with weaning      repeat blood cultures negative so far    HPI:      40 y/o female with lupus, RA, epilepsy (last seizure 8 yrs ago), IVDA  , who presented to ED  due to weakness. Pt states that her grandmother  approx. 3 months ago and for the past almost 2 months she has been snorting two bags of heroin a day.  Patient developed septic shock , MRSA bacteremia on , Patient was intubated on .  Developed acute renal failure, creatinine has improved, bp is better  anemia, had coffee groungs, old blood on endoscopy   Tachypnea with weaning, infiltrates improving on cxr    ROS - cant assess       MEDICATIONS  (STANDING):  chlorhexidine 0.12% Liquid 15 milliLiter(s) Oral Mucosa every 12 hours  collagenase Ointment 1 Application(s) Topical daily  dextrose 5%. 1000 milliLiter(s) (50 mL/Hr) IV Continuous <Continuous>  dextrose 50% Injectable 12.5 Gram(s) IV Push once  dextrose 50% Injectable 25 Gram(s) IV Push once  dextrose 50% Injectable 25 Gram(s) IV Push once  folic acid 1 milliGRAM(s) Oral daily  heparin   Injectable 5000 Unit(s) SubCutaneous every 8 hours  melatonin 5 milliGRAM(s) Oral at bedtime  pantoprazole  Injectable 40 milliGRAM(s) IV Push every 12 hours  propofol Infusion 10 MICROgram(s)/kG/Min (3.26 mL/Hr) IV Continuous <Continuous>  silver sulfADIAZINE 1% Cream 1 Application(s) Topical daily  thiamine 100 milliGRAM(s) Oral daily  vancomycin  IVPB 500 milliGRAM(s) IV Intermittent daily    MEDICATIONS  (PRN):  acetaminophen   Tablet .. 650 milliGRAM(s) Oral every 4 hours PRN Temp greater or equal to 38C (100.4F), Mild Pain (1 - 3)  dextrose 40% Gel 15 Gram(s) Oral once PRN Blood Glucose LESS THAN 70 milliGRAM(s)/deciLiter  glucagon  Injectable 1 milliGRAM(s) IntraMuscular once PRN Glucose <70 milliGRAM(s)/deciLiter  hydrOXYzine hydrochloride 50 milliGRAM(s) Oral every 4 hours PRN Anxiety  loperamide 2 milliGRAM(s) Oral every 4 hours PRN Loose stool  LORazepam     Tablet 2 milliGRAM(s) Oral every 6 hours PRN Anxiety  oxyCODONE    IR 5 milliGRAM(s) Oral every 4 hours PRN Moderate Pain (4 - 6)      ICU Vital Signs Last 24 Hrs  T(C): 37.8 (2020 22:03), Max: 38.3 (2020 14:06)  T(F): 100 (2020 22:03), Max: 100.9 (2020 14:06)  HR: 114 (2020 09:00) (105 - 135)  BP: 132/87 (2020 09:00) (115/78 - 135/85)  BP(mean): 97 (2020 09:00) (85 - 99)  ABP: --  ABP(mean): --  RR: --  SpO2: 100% (2020 09:00) (91% - 100%)      Mode: AC/ CMV (Assist Control/ Continuous Mandatory Ventilation)  RR (machine): 20  TV (machine): 400  FiO2: 30  PEEP: 5  PS: 5  ITime: 1  MAP: 12  PIP: 26    I&O's Summary    2020 07:01  -  2020 07:00  --------------------------------------------------------  IN: 887 mL / OUT: 1450 mL / NET: -563 mL                          7.1    14.15 )-----------( 389      ( 2020 06:28 )             22.7       07-08    151<H>  |  118<H>  |  49<H>  ----------------------------<  80  3.8   |  23  |  0.98    Ca    7.6<L>      2020 07:45  Phos  4.3     07-07  Mg     2.1     07-07    TPro  6.3  /  Alb  1.3<L>  /  TBili  0.5  /  DBili  x   /  AST  59<H>  /  ALT  31  /  AlkPhos  306<H>  07-07                    DVT Prophylaxis:                                                                 Advanced Directives:

## 2020-07-08 NOTE — PROGRESS NOTE ADULT - PROBLEM SELECTOR PROBLEM 3
Abnormal echocardiogram

## 2020-07-08 NOTE — PROGRESS NOTE ADULT - PROBLEM SELECTOR PLAN 1
MRSA bacteremia - receiving IV antibiotics; GRAHAM shows no evidence of endocarditis.  blood cultures showing yeast in latest blood culture & sputum noted. Will d/w ID.

## 2020-07-09 LAB
ANION GAP SERPL CALC-SCNC: 8 MMOL/L — SIGNIFICANT CHANGE UP (ref 5–17)
BUN SERPL-MCNC: 46 MG/DL — HIGH (ref 7–23)
CALCIUM SERPL-MCNC: 7.5 MG/DL — LOW (ref 8.5–10.1)
CHLORIDE SERPL-SCNC: 120 MMOL/L — HIGH (ref 96–108)
CO2 SERPL-SCNC: 22 MMOL/L — SIGNIFICANT CHANGE UP (ref 22–31)
CREAT SERPL-MCNC: 0.92 MG/DL — SIGNIFICANT CHANGE UP (ref 0.5–1.3)
GLUCOSE SERPL-MCNC: 106 MG/DL — HIGH (ref 70–99)
HCT VFR BLD CALC: 32 % — LOW (ref 34.5–45)
HGB BLD-MCNC: 9.7 G/DL — LOW (ref 11.5–15.5)
MCHC RBC-ENTMCNC: 28.6 PG — SIGNIFICANT CHANGE UP (ref 27–34)
MCHC RBC-ENTMCNC: 30.3 GM/DL — LOW (ref 32–36)
MCV RBC AUTO: 94.4 FL — SIGNIFICANT CHANGE UP (ref 80–100)
PLATELET # BLD AUTO: 361 K/UL — SIGNIFICANT CHANGE UP (ref 150–400)
POTASSIUM SERPL-MCNC: 3.8 MMOL/L — SIGNIFICANT CHANGE UP (ref 3.5–5.3)
POTASSIUM SERPL-SCNC: 3.8 MMOL/L — SIGNIFICANT CHANGE UP (ref 3.5–5.3)
RBC # BLD: 3.39 M/UL — LOW (ref 3.8–5.2)
RBC # FLD: 18.9 % — HIGH (ref 10.3–14.5)
SODIUM SERPL-SCNC: 150 MMOL/L — HIGH (ref 135–145)
WBC # BLD: 13.84 K/UL — HIGH (ref 3.8–10.5)
WBC # FLD AUTO: 13.84 K/UL — HIGH (ref 3.8–10.5)

## 2020-07-09 PROCEDURE — 73610 X-RAY EXAM OF ANKLE: CPT | Mod: 26,50

## 2020-07-09 PROCEDURE — 99291 CRITICAL CARE FIRST HOUR: CPT

## 2020-07-09 RX ORDER — SODIUM HYPOCHLORITE 0.125 %
1 SOLUTION, NON-ORAL MISCELLANEOUS DAILY
Refills: 0 | Status: DISCONTINUED | OUTPATIENT
Start: 2020-07-09 | End: 2020-07-31

## 2020-07-09 RX ORDER — SODIUM CHLORIDE 9 MG/ML
1000 INJECTION, SOLUTION INTRAVENOUS
Refills: 0 | Status: DISCONTINUED | OUTPATIENT
Start: 2020-07-09 | End: 2020-07-11

## 2020-07-09 RX ORDER — SODIUM CHLORIDE 9 MG/ML
1000 INJECTION, SOLUTION INTRAVENOUS ONCE
Refills: 0 | Status: COMPLETED | OUTPATIENT
Start: 2020-07-09 | End: 2020-07-09

## 2020-07-09 RX ADMIN — Medication 100 MILLIGRAM(S): at 10:06

## 2020-07-09 RX ADMIN — Medication 5 MILLIGRAM(S): at 21:20

## 2020-07-09 RX ADMIN — Medication 1 APPLICATION(S): at 10:07

## 2020-07-09 RX ADMIN — Medication 650 MILLIGRAM(S): at 07:57

## 2020-07-09 RX ADMIN — Medication 1 APPLICATION(S): at 10:08

## 2020-07-09 RX ADMIN — HEPARIN SODIUM 5000 UNIT(S): 5000 INJECTION INTRAVENOUS; SUBCUTANEOUS at 21:20

## 2020-07-09 RX ADMIN — Medication 650 MILLIGRAM(S): at 16:06

## 2020-07-09 RX ADMIN — CHLORHEXIDINE GLUCONATE 15 MILLILITER(S): 213 SOLUTION TOPICAL at 05:26

## 2020-07-09 RX ADMIN — Medication 1 MILLIGRAM(S): at 15:05

## 2020-07-09 RX ADMIN — SODIUM CHLORIDE 75 MILLILITER(S): 9 INJECTION, SOLUTION INTRAVENOUS at 10:14

## 2020-07-09 RX ADMIN — Medication 2 MILLIGRAM(S): at 10:15

## 2020-07-09 RX ADMIN — Medication 650 MILLIGRAM(S): at 12:00

## 2020-07-09 RX ADMIN — HEPARIN SODIUM 5000 UNIT(S): 5000 INJECTION INTRAVENOUS; SUBCUTANEOUS at 05:26

## 2020-07-09 RX ADMIN — PANTOPRAZOLE SODIUM 40 MILLIGRAM(S): 20 TABLET, DELAYED RELEASE ORAL at 21:20

## 2020-07-09 RX ADMIN — Medication 100 MILLIGRAM(S): at 15:05

## 2020-07-09 RX ADMIN — OXYCODONE HYDROCHLORIDE 5 MILLIGRAM(S): 5 TABLET ORAL at 19:40

## 2020-07-09 RX ADMIN — FLUCONAZOLE 100 MILLIGRAM(S): 150 TABLET ORAL at 10:06

## 2020-07-09 RX ADMIN — CHLORHEXIDINE GLUCONATE 15 MILLILITER(S): 213 SOLUTION TOPICAL at 17:52

## 2020-07-09 RX ADMIN — Medication 2 MILLIGRAM(S): at 16:06

## 2020-07-09 RX ADMIN — HEPARIN SODIUM 5000 UNIT(S): 5000 INJECTION INTRAVENOUS; SUBCUTANEOUS at 15:05

## 2020-07-09 RX ADMIN — Medication 2 MILLIGRAM(S): at 21:21

## 2020-07-09 RX ADMIN — SODIUM CHLORIDE 1000 MILLILITER(S): 9 INJECTION, SOLUTION INTRAVENOUS at 10:15

## 2020-07-09 RX ADMIN — PANTOPRAZOLE SODIUM 40 MILLIGRAM(S): 20 TABLET, DELAYED RELEASE ORAL at 10:09

## 2020-07-09 NOTE — PROGRESS NOTE ADULT - ASSESSMENT
40 yo female with hx of SLE, RA, ( formerly on MTX up to year ago - followed by Dr Del Rosario) , hx of IVDA and now heroin use  now presengint with weakness and found to be MRSA bacteremia and renal eval called for elevated creatinine    NATALIA    Cr improved   -  keep SBP > 110 for renal perfusion as able     Hypernatremia with large UOP - intravasculr depleted w fevers and tachycardia    continue free water via NGT  60/hr free water     agree with ICU regarding IVF with tachycardia and fevers - if hypotension then change to LR or NS   continue D5W      MRSA bacteremia with septic shock hypoxic resp failure    IV abx /ID    Vent per ICU level of care    GRAHAM neg   per ID         d/w CCU staff

## 2020-07-09 NOTE — PROGRESS NOTE ADULT - ASSESSMENT
40 y/o female with h/o SLE, RA, Reynauld syndrome, epilepsy (last seizure 8 yrs ago), IVDA (heroin) was admitted on  for increased weakness. Pt states that her grandmother  approx. 3 months ago and for the past almost 2 months she has been snorting two bags of heroin a day. Pt states her heroin use is partially due to depression and anxiety since gma passed away however also due to financial reasons as can no longer afford oxycodone which she had been taking for her lupus/RA chronic pain as Rx. Pt states she has wounds all over her body from falls, ulcers from previous attempted injection sites, which are not healing and bleed at times. Pt describes weakness as fatigue, endorses sob with exertion, weight loss due to decreased appetite, palpitations. Denies fever, cough. In ER she was found with low grade fever and received vancomycin IV and zosyn.     1. Sepsis with MRSA resolving. Skin ulcers ?track marks. Possible SBE. Soft tissue portal vein/ liver mass ?cause Probable arms cellulitis. s/p UTI with MRSA. Chronic renal failure. Acute respiratory failure.   -fugemia; likely diseminated candidiasis due to prolonged cental line  -TLC wasremoved  -leukocytosis improving  -BC reviewed  -repeat BC could not be obtained so far; to try again  -on vancomycin # 14   -tolerating abx well so far; no side effects noted  -complete IV vancomycin today  -on fluconazole 400 mg IV qd  -reason for abx use and side effects reviewed with patient; monitor BMP  -cardiology evaluation appreciated:  GRAHAM dose not show valvular vegetations  -continue antifungal coverage  -has extended dwell  -monitor temps  -f/u CBC  -supportive care  2. Other issues:   -care per medicine

## 2020-07-09 NOTE — CONSULT NOTE ADULT - SUBJECTIVE AND OBJECTIVE BOX
Date of service 7/9  Chief Complaint: B/L lower extremity wounds     HPI:   7/9: Pt was examined at bedside by podiatry for B/L lower extremity ulcerative lesions. Pt is a 40 yo female who has been admitted to the CCU for septic shock, UTI with MRSA and bacteremia. The pt was intubated on 6/27. The patient is also being treated for anemia, acute respiratory failure and toxic metabolic encephalopathy. The patient has a history of IVDA drug use and is critcally ill and at risk for acute decompensation and possible death per the intensivist. Due to the patient being intubated all additional medical history and information was gathered from the patients chart.    REVIEW OF SYSTEMS:  Unable to assess due to pt's inability to communicate per intubation    Allergies:  morphine (Unknown)    Past Medical History:  Lupus, RA, Reynauds, Epilepsey, IVDA with Hx of Snorting 2 bags per day Heroin    Family History:  FAMILY HISTORY:  No pertinent family history in first degree relatives: cannot recall family history at this time    Social History:  lives alone  single  smokes 4 cig/day  uses two bags of heroin a day (24 Jun 2020 01:21)    Medications: (STANDING):  chlorhexidine 0.12% Liquid 15 milliLiter(s) Oral Mucosa every 12 hours  Social History:  lives alone  single  smokes 4 cig/day  uses two bags of heroin a day (24 Jun 2020 01:21)  collagenase Ointment 1 Application(s) Topical daily  Dakins Solution - 1/4 Strength 1 Application(s) Topical daily  dextrose 5%. 1000 milliLiter(s) (75 mL/Hr) IV Continuous <Continuous>  fluconAZOLE IVPB 400 milliGRAM(s) IV Intermittent every 24 hours  folic acid 1 milliGRAM(s) Oral daily  heparin   Injectable 5000 Unit(s) SubCutaneous every 8 hours  melatonin 5 milliGRAM(s) Oral at bedtime  pantoprazole  Injectable 40 milliGRAM(s) IV Push every 12 hours  silver sulfADIAZINE 1% Cream 1 Application(s) Topical daily  thiamine 100 milliGRAM(s) Oral daily    MEDICATIONS  (PRN):  acetaminophen   Tablet .. 650 milliGRAM(s) Oral every 4 hours PRN Temp greater or equal to 38C (100.4F), Mild Pain (1 - 3)  glucagon  Injectable 1 milliGRAM(s) IntraMuscular once PRN Glucose <70 milliGRAM(s)/deciLiter  hydrOXYzine hydrochloride 50 milliGRAM(s) Oral every 4 hours PRN Anxiety  loperamide 2 milliGRAM(s) Oral every 4 hours PRN Loose stool  LORazepam     Tablet 2 milliGRAM(s) Oral every 6 hours PRN Anxiety  oxyCODONE    IR 5 milliGRAM(s) Oral every 4 hours PRN Moderate Pain (4 - 6)      Vital Signs Last 24 Hrs  T(C): 37.4 (09 Jul 2020 15:00), Max: 38.3 (09 Jul 2020 12:00)  T(F): 99.4 (09 Jul 2020 15:00), Max: 101 (09 Jul 2020 12:00)  HR: 141 (09 Jul 2020 15:00) (88 - 151)  BP: 142/115 (09 Jul 2020 15:00) (124/89 - 154/113)  BP(mean): 120 (09 Jul 2020 15:00) (89 - 124)  RR: --  SpO2: 96% (09 Jul 2020 15:00) (88% - 100%)  I&O's Summary    08 Jul 2020 07:01  -  09 Jul 2020 07:00  --------------------------------------------------------  IN: 1040 mL / OUT: 1000 mL / NET: 40 mL        PE:    Consititutional: Pt is awake  Vasc: DP and PT pulses non palpable b/l, CFT < 5 x 10, TG: warm to warm  Neuro: Unable to assess b/c pt is intubated  MSk: Unable to assess b/c pt is intubated   Derm:  Rt LE  - Exposed peroneal tendons on lateral aspect of distal RLE that + probe to Fibula  - Rt 5th toe demonstrates a full thickness fibronecotic wound measuring approx. 3.5cmx3.0cm, + purulence, - pus, - malodor, - fluctuance +Probe to 5th digit distally   Lt LE:  -Full thickness ulcer approx .5cm distal to lateral malleolus, tunneling to 3 and 6 o'clock positions, + undrmining all around, - drainage, - fluctuance - malodor, - probe to bone, - pus  -Erythematous fluctuant cystic lesions measuring approx. 2.5cmx2.0cm on Medial Mal of LLE,   -Partial thickness ulcer, 4th interspace with presence of dry blood, - probe to bone  -Non gradable eschar on 3rd digit on Lft    LABS:                         9.7    13.84 )-----------( 361      ( 09 Jul 2020 08:23 )             32.0     07-09    150<H>  |  120<H>  |  46<H>  ----------------------------<  106<H>  3.8   |  22  |  0.92    Ca    7.5<L>      09 Jul 2020 08:23        Urine Microscopic-Add On (NC) (06.25.20 @ 21:20)    Bacteria: Many    Comment - Urine: Moderate Trichomonas    Epithelial Cells: Many    Red Blood Cell - Urine: >50 /HPF    White Blood Cell - Urine: 6-10    Blood Culture:   07-06 @ 14:30  Organism Blood Culture PCR  Gram Stain Blood -- Gram Stain   Growth in aerobic bottle: Yeast like cells  Specimen Source .Blood Blood-Peripheral  Culture-Blood --        Imaging:    < from: TTE Echo Complete w/o Contrast w/ Doppler (06.27.20 @ 08:58) >   Impression     Summary     The mid to apical anterolateral to anterior segments are severely   hypokinetic. The apical inferoseptal wall appears hypokinetic.   Estimated left ventricular ejection fraction is 45-50 %.   A catheter wire is seen in the right atrium.   Mild (1+) tricuspid valve regurgitation is present.   Mild pulmonary hypertension.    < end of copied text > Date of service: 7/9/2020  Chief Complaint: B/L lower extremity wounds     HPI: Pt was examined at bedside by podiatry for B/L lower extremity ulcerative lesions. Pt is a 40 yo female who has been admitted to the CCU for septic shock, UTI with MRSA and bacteremia. Noted Pt is currently intubated and non verbal, she is alert and oriented. The patient is also being treated for anemia, acute respiratory failure and toxic metabolic encephalopathy. Of Note, Pt has a history of IVDA drug use and is critically ill and at risk for acute decompensation possible death per the intensivist. Due to the patient being intubated all additional medical history and information was gathered from the patients chart.    REVIEW OF SYSTEMS:  Unable to assess due to pt's inability to communicate per intubation    Allergies:  morphine (Unknown)    Past Medical History:  Lupus, RA, Reynard's Epilepsy, IVDA     Family History:  No pertinent family history in first degree relatives: cannot recall family history at this time    Social History:  lives alone  single  smokes 4 cig/day  uses two bags of heroin a day (24 Jun 2020 01:21)    Medications (STANDING):  chlorhexidine 0.12% Liquid 15 milliLiter(s) Oral Mucosa every 12 hours  collagenase Ointment 1 Application(s) Topical daily  Dakins Solution - 1/4 Strength 1 Application(s) Topical daily  dextrose 5%. 1000 milliLiter(s) (75 mL/Hr) IV Continuous <Continuous>  fluconAZOLE IVPB 400 milliGRAM(s) IV Intermittent every 24 hours  folic acid 1 milliGRAM(s) Oral daily  heparin   Injectable 5000 Unit(s) SubCutaneous every 8 hours  melatonin 5 milliGRAM(s) Oral at bedtime  pantoprazole  Injectable 40 milliGRAM(s) IV Push every 12 hours  silver sulfADIAZINE 1% Cream 1 Application(s) Topical daily  thiamine 100 milliGRAM(s) Oral daily    MEDICATIONS  (PRN):  acetaminophen   Tablet .. 650 milliGRAM(s) Oral every 4 hours PRN Temp greater or equal to 38C (100.4F), Mild Pain (1 - 3)  glucagon  Injectable 1 milliGRAM(s) IntraMuscular once PRN Glucose <70 milliGRAM(s)/deciLiter  hydrOXYzine hydrochloride 50 milliGRAM(s) Oral every 4 hours PRN Anxiety  loperamide 2 milliGRAM(s) Oral every 4 hours PRN Loose stool  LORazepam     Tablet 2 milliGRAM(s) Oral every 6 hours PRN Anxiety  oxyCODONE    IR 5 milliGRAM(s) Oral every 4 hours PRN Moderate Pain (4 - 6)    Vital Signs Last 24 Hrs  T(C): 37.4 (09 Jul 2020 15:00), Max: 38.3 (09 Jul 2020 12:00)  T(F): 99.4 (09 Jul 2020 15:00), Max: 101 (09 Jul 2020 12:00)  HR: 141 (09 Jul 2020 15:00) (88 - 151)  BP: 142/115 (09 Jul 2020 15:00) (124/89 - 154/113)  BP(mean): 120 (09 Jul 2020 15:00) (89 - 124)  SpO2: 96% (09 Jul 2020 15:00) (88% - 100%)  I&O's Summary    08 Jul 2020 07:01  -  09 Jul 2020 07:00  --------------------------------------------------------  IN: 1040 mL / OUT: 1000 mL / NET: 40 mL    Physical examination:  Constitutional Pt is awake, alert and oriented, intubated  Focused LE examination:   Vasc: DP and PT pulses non palpable b/l, CFT < 5 x 10, TG: warm to warm  Neuro and MSK: Unable to assess b/c pt is intubated    Derm:  Noted Multiple hyperpigmented scar-like lesions scattered all over LE, bl    Rt LE  - Full thickness ulcer with exposed peroneal tendons on lateral aspect of distal RLE that + probe to Fibula, no drainage, + undermining all around the clock, + tenderness.  - Rt 5th toe demonstrates a full thickness fibronecotic wound measuring approx. 7.2 x 4.6 x 0.8 cm, + purulence, - malodor, - fluctuance +Probe to 5th metatarsal head, and + undermining from the medial aspect, + tunneling to the 9 hour    Lt LE:  -Full thickness ulcer round in shape and approx  0.5 cm distal to lateral malleolus, tunneling to 3 and 6 o'clock positions, + undermining all around, - drainage, - fluctuance - malodor, - probe to bone, - pus  -Erythematous fluctuant cystic lesions measuring approx. 4.0 cm in diameter on medial malleolus of LLE,   -Partial thickness ulcer, 4th interdigital space with presence of dry blood, - probe to bone, - undermining - tunneling  -Non gradable eschar on 3rd digit on Lt foot    LABS:                         9.7    13.84 )-----------( 361      ( 09 Jul 2020 08:23 )             32.0     07-09    150<H>  |  120<H>  |  46<H>  ----------------------------<  106<H>  3.8   |  22  |  0.92    Ca    7.5<L>      09 Jul 2020 08:23    Urine Microscopic-Add On (NC) (06.25.20 @ 21:20)    Bacteria: Many    Comment - Urine: Moderate Trichomonas    Epithelial Cells: Many    Red Blood Cell - Urine: >50 /HPF    White Blood Cell - Urine: 6-10    Blood Culture:   07-06 @ 14:30  Organism Blood Culture PCR  Gram Stain Blood -- Gram Stain   Growth in aerobic bottle: Yeast like cells  Specimen Source .Blood Blood-Peripheral  Culture-Blood --    Culture - Blood in AM (07.06.20 @ 14:30)    -  Methicillin resistant Staphylococcus aureus (MRSA): Nondet    -  Coagulase negative Staphylococcus: Nondet    -  Enterococcus species: Nondet    -  Vancomycin resistant Enterococcus sp.: Nondet    -  Escherichia coli: Nondet    -  Klebsiella oxytoca: Nondet    -  Klebsiella pneumoniae: Nondet    -  Serratia marcescens: Nondet    -  Haemophilus influenzae: Nondet    -  Listeria monocytogenes: Nondet    -  Neisseria meningitidis: Nondet    -  Pseudomonas aeruginosa: Nondet    -  Acinetobacter baumanii: Nondet    -  Enterobacter cloacae complex: Nondet    -  Streptococcus sp. (Not Grp A, B or S pneumoniae): Nondet    -  Streptococcus agalactiae (Group B): Nondet    -  Streptococcus pyogenes (Group A): Nondet    -  Streptococcus pneumoniae: Nondet    -  Candida albicans: Nondet    -  Candida glabrata: Nondet    -  Candida krusei: Nondet    -  Candida parapsilosis: Nondet    -  Candida tropicalis: Nondet    -  Multidrug (KPC pos) resistant organism: Nondet    -  Staphylococcus aureus: Nondet    Gram Stain:   Growth in aerobic bottle: Yeast like cells    Specimen Source: .Blood Blood-Peripheral    Organism: Blood Culture PCR    Culture Results:   Growth in aerobic bottle: Brittny dubliniensis  "Due to technical problems, Proteus sp. will Not be reported as part of  the BCID panel until further notice"  ***Blood Panel PCR results on this specimen are available  approximately 3 hours after the Gram stain result.***  Gram stain, PCR, and/or culture results may not always  correspond due to difference in methodologies.      Imaging:    < from: TTE Echo Complete w/o Contrast w/ Doppler (06.27.20 @ 08:58) >   Impression   Summary   The mid to apical anterolateral to anterior segments are severely   hypokinetic. The apical inferoseptal wall appears hypokinetic.   Estimated left ventricular ejection fraction is 45-50 %.   A catheter wire is seen in the right atrium.   Mild (1+) tricuspid valve regurgitation is present.   Mild pulmonary hypertension.  < end of copied text > Date of service: 7/9/2020  Chief Complaint: B/L lower extremity wounds     HPI: Pt was examined at bedside by podiatry with attending present, for B/L lower extremity ulcerative lesions. Pt is a 38 yo female who has been admitted to the CCU for septic shock, UTI with MRSA and bacteremia. Noted Pt is currently intubated and non verbal, she is alert and oriented. The patient is also being treated for anemia, acute respiratory failure and toxic metabolic encephalopathy. Of Note, Pt has a history of IVDA drug use and is critically ill and at risk for acute decompensation possible death per the intensivist. Due to the patient being intubated all additional medical history and information was gathered from the patients chart.    REVIEW OF SYSTEMS:  Unable to assess due to pt's inability to communicate per intubation    Allergies:  morphine (Unknown)    Past Medical History:  Lupus, RA, Reynard's Epilepsy, IVDA     Family History:  No pertinent family history in first degree relatives: cannot recall family history at this time    Social History:  lives alone  single  smokes 4 cig/day  uses two bags of heroin a day (24 Jun 2020 01:21)    Medications (STANDING):  chlorhexidine 0.12% Liquid 15 milliLiter(s) Oral Mucosa every 12 hours  collagenase Ointment 1 Application(s) Topical daily  Dakins Solution - 1/4 Strength 1 Application(s) Topical daily  dextrose 5%. 1000 milliLiter(s) (75 mL/Hr) IV Continuous <Continuous>  fluconAZOLE IVPB 400 milliGRAM(s) IV Intermittent every 24 hours  folic acid 1 milliGRAM(s) Oral daily  heparin   Injectable 5000 Unit(s) SubCutaneous every 8 hours  melatonin 5 milliGRAM(s) Oral at bedtime  pantoprazole  Injectable 40 milliGRAM(s) IV Push every 12 hours  silver sulfADIAZINE 1% Cream 1 Application(s) Topical daily  thiamine 100 milliGRAM(s) Oral daily    MEDICATIONS  (PRN):  acetaminophen   Tablet .. 650 milliGRAM(s) Oral every 4 hours PRN Temp greater or equal to 38C (100.4F), Mild Pain (1 - 3)  glucagon  Injectable 1 milliGRAM(s) IntraMuscular once PRN Glucose <70 milliGRAM(s)/deciLiter  hydrOXYzine hydrochloride 50 milliGRAM(s) Oral every 4 hours PRN Anxiety  loperamide 2 milliGRAM(s) Oral every 4 hours PRN Loose stool  LORazepam     Tablet 2 milliGRAM(s) Oral every 6 hours PRN Anxiety  oxyCODONE    IR 5 milliGRAM(s) Oral every 4 hours PRN Moderate Pain (4 - 6)    Vital Signs Last 24 Hrs  T(C): 37.4 (09 Jul 2020 15:00), Max: 38.3 (09 Jul 2020 12:00)  T(F): 99.4 (09 Jul 2020 15:00), Max: 101 (09 Jul 2020 12:00)  HR: 141 (09 Jul 2020 15:00) (88 - 151)  BP: 142/115 (09 Jul 2020 15:00) (124/89 - 154/113)  BP(mean): 120 (09 Jul 2020 15:00) (89 - 124)  SpO2: 96% (09 Jul 2020 15:00) (88% - 100%)  I&O's Summary    08 Jul 2020 07:01  -  09 Jul 2020 07:00  --------------------------------------------------------  IN: 1040 mL / OUT: 1000 mL / NET: 40 mL    Physical examination:  Constitutional Pt is awake, alert and oriented, intubated  Focused LE examination:   Vasc: DP and PT pulses non palpable b/l, CFT < 5 x 10, TG: warm to warm  Neuro and MSK: Unable to assess b/c pt is intubated    Derm:  Noted Multiple hyperpigmented scar-like lesions scattered all over LE, bl    Rt LE  - Full thickness ulcer with exposed peroneal tendons on lateral aspect of distal RLE that + probe to Fibula, no drainage, + undermining all around the clock, + tenderness.  - Rt 5th toe demonstrates a full thickness fibronecotic wound measuring approx. 7.2 x 4.6 x 0.8 cm, + purulence, - malodor, - fluctuance +Probe to 5th metatarsal head, and + undermining from the medial aspect, + tunneling to the 9 hour    Lt LE:  -Full thickness ulcer round in shape and approx  0.5 cm distal to lateral malleolus, tunneling to 3 and 6 o'clock positions, + undermining all around, - drainage, - fluctuance - malodor, - probe to bone, - pus  -Swelling and mild edema noted over the medial malleolus of LLE,   -Partial thickness ulcer, 4th interdigital space with presence of dry blood, - probe to bone, - undermining - tunneling  -Non gradable eschar on 3rd digit on Lt foot    LABS:                         9.7    13.84 )-----------( 361      ( 09 Jul 2020 08:23 )             32.0     07-09    150<H>  |  120<H>  |  46<H>  ----------------------------<  106<H>  3.8   |  22  |  0.92    Ca    7.5<L>      09 Jul 2020 08:23    Urine Microscopic-Add On (NC) (06.25.20 @ 21:20)    Bacteria: Many    Comment - Urine: Moderate Trichomonas    Epithelial Cells: Many    Red Blood Cell - Urine: >50 /HPF    White Blood Cell - Urine: 6-10    Blood Culture:   07-06 @ 14:30  Organism Blood Culture PCR  Gram Stain Blood -- Gram Stain   Growth in aerobic bottle: Yeast like cells  Specimen Source .Blood Blood-Peripheral  Culture-Blood --    Culture - Blood in AM (07.06.20 @ 14:30)    -  Methicillin resistant Staphylococcus aureus (MRSA): Nondet    -  Coagulase negative Staphylococcus: Nondet    -  Enterococcus species: Nondet    -  Vancomycin resistant Enterococcus sp.: Nondet    -  Escherichia coli: Nondet    -  Klebsiella oxytoca: Nondet    -  Klebsiella pneumoniae: Nondet    -  Serratia marcescens: Nondet    -  Haemophilus influenzae: Nondet    -  Listeria monocytogenes: Nondet    -  Neisseria meningitidis: Nondet    -  Pseudomonas aeruginosa: Nondet    -  Acinetobacter baumanii: Nondet    -  Enterobacter cloacae complex: Nondet    -  Streptococcus sp. (Not Grp A, B or S pneumoniae): Nondet    -  Streptococcus agalactiae (Group B): Nondet    -  Streptococcus pyogenes (Group A): Nondet    -  Streptococcus pneumoniae: Nondet    -  Candida albicans: Nondet    -  Candida glabrata: Nondet    -  Candida krusei: Nondet    -  Candida parapsilosis: Nondet    -  Candida tropicalis: Nondet    -  Multidrug (KPC pos) resistant organism: Nondet    -  Staphylococcus aureus: Nondet    Gram Stain:   Growth in aerobic bottle: Yeast like cells    Specimen Source: .Blood Blood-Peripheral    Organism: Blood Culture PCR    Culture Results:   Growth in aerobic bottle: Brittny dubliniensis  "Due to technical problems, Proteus sp. will Not be reported as part of  the BCID panel until further notice"  ***Blood Panel PCR results on this specimen are available  approximately 3 hours after the Gram stain result.***  Gram stain, PCR, and/or culture results may not always  correspond due to difference in methodologies.      Imaging:    < from: TTE Echo Complete w/o Contrast w/ Doppler (06.27.20 @ 08:58) >   Impression   Summary   The mid to apical anterolateral to anterior segments are severely   hypokinetic. The apical inferoseptal wall appears hypokinetic.   Estimated left ventricular ejection fraction is 45-50 %.   A catheter wire is seen in the right atrium.   Mild (1+) tricuspid valve regurgitation is present.   Mild pulmonary hypertension.  < end of copied text > Date of service: 7/9/2020  Chief Complaint: B/L lower extremity wounds     HPI: Pt was examined at bedside by podiatry with attending present, for B/L lower extremity ulcerative lesions. Pt is a 38 yo female who has been admitted to the CCU for septic shock, UTI with MRSA and bacteremia. Noted Pt is currently intubated and non verbal, she is alert and oriented. The patient is also being treated for anemia, acute respiratory failure and toxic metabolic encephalopathy. Of Note, Pt has a history of IVDA drug use and is critically ill and at risk for acute decompensation possible death per the intensivist. Due to the patient being intubated all additional medical history and information was gathered from the patients chart.    REVIEW OF SYSTEMS:  Unable to assess due to pt's inability to communicate per intubation    Allergies:  morphine (Unknown)    Past Medical History:  Lupus, RA, Reynard's Epilepsy, IVDA     Family History:  No pertinent family history in first degree relatives: cannot recall family history at this time    Social History:  lives alone  single  smokes 4 cig/day  uses two bags of heroin a day (24 Jun 2020 01:21)    Medications (STANDING):  chlorhexidine 0.12% Liquid 15 milliLiter(s) Oral Mucosa every 12 hours  collagenase Ointment 1 Application(s) Topical daily  Dakins Solution - 1/4 Strength 1 Application(s) Topical daily  dextrose 5%. 1000 milliLiter(s) (75 mL/Hr) IV Continuous <Continuous>  fluconAZOLE IVPB 400 milliGRAM(s) IV Intermittent every 24 hours  folic acid 1 milliGRAM(s) Oral daily  heparin   Injectable 5000 Unit(s) SubCutaneous every 8 hours  melatonin 5 milliGRAM(s) Oral at bedtime  pantoprazole  Injectable 40 milliGRAM(s) IV Push every 12 hours  silver sulfADIAZINE 1% Cream 1 Application(s) Topical daily  thiamine 100 milliGRAM(s) Oral daily    MEDICATIONS  (PRN):  acetaminophen   Tablet .. 650 milliGRAM(s) Oral every 4 hours PRN Temp greater or equal to 38C (100.4F), Mild Pain (1 - 3)  glucagon  Injectable 1 milliGRAM(s) IntraMuscular once PRN Glucose <70 milliGRAM(s)/deciLiter  hydrOXYzine hydrochloride 50 milliGRAM(s) Oral every 4 hours PRN Anxiety  loperamide 2 milliGRAM(s) Oral every 4 hours PRN Loose stool  LORazepam     Tablet 2 milliGRAM(s) Oral every 6 hours PRN Anxiety  oxyCODONE    IR 5 milliGRAM(s) Oral every 4 hours PRN Moderate Pain (4 - 6)    Vital Signs Last 24 Hrs  T(C): 37.4 (09 Jul 2020 15:00), Max: 38.3 (09 Jul 2020 12:00)  T(F): 99.4 (09 Jul 2020 15:00), Max: 101 (09 Jul 2020 12:00)  HR: 141 (09 Jul 2020 15:00) (88 - 151)  BP: 142/115 (09 Jul 2020 15:00) (124/89 - 154/113)  BP(mean): 120 (09 Jul 2020 15:00) (89 - 124)  SpO2: 96% (09 Jul 2020 15:00) (88% - 100%)  I&O's Summary    08 Jul 2020 07:01  -  09 Jul 2020 07:00  --------------------------------------------------------  IN: 1040 mL / OUT: 1000 mL / NET: 40 mL    Physical examination:  Constitutional Pt is awake, alert and oriented, intubated  Focused LE examination:   Vasc: DP and PT pulses non palpable b/l, CFT < 5 x 10, TG: warm to warm  Neuro and MSK: Unable to assess b/c pt is intubated    Derm:  Noted Multiple hyperpigmented scar-like lesions scattered all over LE, bl    Rt LE  - Full thickness ulcer with exposed peroneal tendons on lateral aspect of distal RLE that + probe to Fibula, no drainage, + undermining all around the clock, + tenderness.  - Rt 5th toe demonstrates a full thickness fibronecotic wound measuring approx. 7.2 x 4.6 x 0.8 cm, - purulence, - malodor, - fluctuance +Probe to 5th metatarsal head, EDL tendon exposed and + undermining from the medial aspect, + tunneling to the 9 hour    Lt LE:  -Full thickness ulcer round in shape and approx  0.5 cm distal to lateral malleolus, tunneling to 3 and 6 o'clock positions, + undermining all around, - drainage, - fluctuance - malodor, - probe to bone, - pus  -Full thickness ulcer with exposed Achilles tendon LT  -Swelling and mild edema noted over the medial malleolus of LLE,   -Partial thickness ulcer, 4th interdigital space with presence of dry blood, - probe to bone, - undermining - tunneling  -Non gradable eschar on 3rd digit on Lt foot    LABS:                         9.7    13.84 )-----------( 361      ( 09 Jul 2020 08:23 )             32.0     07-09    150<H>  |  120<H>  |  46<H>  ----------------------------<  106<H>  3.8   |  22  |  0.92    Ca    7.5<L>      09 Jul 2020 08:23    Urine Microscopic-Add On (NC) (06.25.20 @ 21:20)    Bacteria: Many    Comment - Urine: Moderate Trichomonas    Epithelial Cells: Many    Red Blood Cell - Urine: >50 /HPF    White Blood Cell - Urine: 6-10    Blood Culture:   07-06 @ 14:30  Organism Blood Culture PCR  Gram Stain Blood -- Gram Stain   Growth in aerobic bottle: Yeast like cells  Specimen Source .Blood Blood-Peripheral  Culture-Blood --    Culture - Blood in AM (07.06.20 @ 14:30)    -  Methicillin resistant Staphylococcus aureus (MRSA): Nondet    -  Coagulase negative Staphylococcus: Nondet    -  Enterococcus species: Nondet    -  Vancomycin resistant Enterococcus sp.: Nondet    -  Escherichia coli: Nondet    -  Klebsiella oxytoca: Nondet    -  Klebsiella pneumoniae: Nondet    -  Serratia marcescens: Nondet    -  Haemophilus influenzae: Nondet    -  Listeria monocytogenes: Nondet    -  Neisseria meningitidis: Nondet    -  Pseudomonas aeruginosa: Nondet    -  Acinetobacter baumanii: Nondet    -  Enterobacter cloacae complex: Nondet    -  Streptococcus sp. (Not Grp A, B or S pneumoniae): Nondet    -  Streptococcus agalactiae (Group B): Nondet    -  Streptococcus pyogenes (Group A): Nondet    -  Streptococcus pneumoniae: Nondet    -  Candida albicans: Nondet    -  Candida glabrata: Nondet    -  Candida krusei: Nondet    -  Candida parapsilosis: Nondet    -  Candida tropicalis: Nondet    -  Multidrug (KPC pos) resistant organism: Nondet    -  Staphylococcus aureus: Nondet    Gram Stain:   Growth in aerobic bottle: Yeast like cells    Specimen Source: .Blood Blood-Peripheral    Organism: Blood Culture PCR    Culture Results:   Growth in aerobic bottle: Brittny dubliniensis  "Due to technical problems, Proteus sp. will Not be reported as part of  the BCID panel until further notice"  ***Blood Panel PCR results on this specimen are available  approximately 3 hours after the Gram stain result.***  Gram stain, PCR, and/or culture results may not always  correspond due to difference in methodologies.      Imaging:    < from: TTE Echo Complete w/o Contrast w/ Doppler (06.27.20 @ 08:58) >   Impression   Summary   The mid to apical anterolateral to anterior segments are severely   hypokinetic. The apical inferoseptal wall appears hypokinetic.   Estimated left ventricular ejection fraction is 45-50 %.   A catheter wire is seen in the right atrium.   Mild (1+) tricuspid valve regurgitation is present.   Mild pulmonary hypertension.  < end of copied text >

## 2020-07-09 NOTE — PROGRESS NOTE ADULT - SUBJECTIVE AND OBJECTIVE BOX
Events Overnight: blood culture positive for yeast, GRAHAM yesterday negative for vegetation, low grade fever 100.4, cxr is markedly improved    HPI:     40 y/o female with lupus, RA, epilepsy (last seizure 8 yrs ago), IVDA  , who presented to ED  due to weakness. Pt states that her grandmother  approx. 3 months ago and for the past almost 2 months she has been snorting two bags of heroin a day.  Patient developed septic shock , MRSA bacteremia on , Patient was intubated on .  Developed acute renal failure, creatinine has improved, bp is better  anemia, had coffee grounds, old blood on endoscopy, received unit of blood hgb is better   Tachypnea with weaning with respiratory rate to 40s, infiltrates improving on cxr    ROS - cant assess      MEDICATIONS  (STANDING):  chlorhexidine 0.12% Liquid 15 milliLiter(s) Oral Mucosa every 12 hours  collagenase Ointment 1 Application(s) Topical daily  dextrose 5%. 1000 milliLiter(s) (50 mL/Hr) IV Continuous <Continuous>  dextrose 50% Injectable 12.5 Gram(s) IV Push once  dextrose 50% Injectable 25 Gram(s) IV Push once  dextrose 50% Injectable 25 Gram(s) IV Push once  fluconAZOLE IVPB 400 milliGRAM(s) IV Intermittent every 24 hours  folic acid 1 milliGRAM(s) Oral daily  heparin   Injectable 5000 Unit(s) SubCutaneous every 8 hours  melatonin 5 milliGRAM(s) Oral at bedtime  pantoprazole  Injectable 40 milliGRAM(s) IV Push every 12 hours  propofol Infusion 10 MICROgram(s)/kG/Min (3.26 mL/Hr) IV Continuous <Continuous>  silver sulfADIAZINE 1% Cream 1 Application(s) Topical daily  thiamine 100 milliGRAM(s) Oral daily  vancomycin  IVPB 500 milliGRAM(s) IV Intermittent daily    MEDICATIONS  (PRN):  acetaminophen   Tablet .. 650 milliGRAM(s) Oral every 4 hours PRN Temp greater or equal to 38C (100.4F), Mild Pain (1 - 3)  dextrose 40% Gel 15 Gram(s) Oral once PRN Blood Glucose LESS THAN 70 milliGRAM(s)/deciLiter  glucagon  Injectable 1 milliGRAM(s) IntraMuscular once PRN Glucose <70 milliGRAM(s)/deciLiter  hydrOXYzine hydrochloride 50 milliGRAM(s) Oral every 4 hours PRN Anxiety  loperamide 2 milliGRAM(s) Oral every 4 hours PRN Loose stool  LORazepam     Tablet 2 milliGRAM(s) Oral every 6 hours PRN Anxiety  oxyCODONE    IR 5 milliGRAM(s) Oral every 4 hours PRN Moderate Pain (4 - 6)      ICU Vital Signs Last 24 Hrs  T(C): 38 (2020 08:00), Max: 38 (2020 08:00)  T(F): 100.4 (2020 08:00), Max: 100.4 (2020 08:00)  HR: 132 (2020 09:00) (88 - 132)  BP: 132/98 (2020 09:00) (124/89 - 164/93)  BP(mean): 104 (2020 09:00) (89 - 111)  ABP: --  ABP(mean): --  RR: 22 (2020 13:53) (22 - 22)  SpO2: 100% (2020 09:00) (88% - 100%)      Mode: AC/ CMV (Assist Control/ Continuous Mandatory Ventilation)  RR (machine): 20  TV (machine): 400  FiO2: 30  PEEP: 5  ITime: 1  MAP: 9  PIP: 20    I&O's Summary    2020 07:01  -  2020 07:00  --------------------------------------------------------  IN: 1040 mL / OUT: 1000 mL / NET: 40 mL                             9.7    13.84 )-----------( 361      ( 2020 08:23 )             32.0       07-09    150<H>  |  120<H>  |  46<H>  ----------------------------<  106<H>  3.8   |  22  |  0.92    Ca    7.5<L>      2020 08:23      DVT Prophylaxis:  Heparin subq                                                               Advanced Directives: Full Code

## 2020-07-09 NOTE — PROGRESS NOTE ADULT - SUBJECTIVE AND OBJECTIVE BOX
Date of service: 20 @ 11:04    Reported with fungemia  Alert  Intubated    ROS: no fever or chills; unobtianable    MEDICATIONS  (STANDING):  chlorhexidine 0.12% Liquid 15 milliLiter(s) Oral Mucosa every 12 hours  collagenase Ointment 1 Application(s) Topical daily  dextrose 5%. 1000 milliLiter(s) (75 mL/Hr) IV Continuous <Continuous>  fluconAZOLE IVPB 400 milliGRAM(s) IV Intermittent every 24 hours  folic acid 1 milliGRAM(s) Oral daily  heparin   Injectable 5000 Unit(s) SubCutaneous every 8 hours  melatonin 5 milliGRAM(s) Oral at bedtime  pantoprazole  Injectable 40 milliGRAM(s) IV Push every 12 hours  silver sulfADIAZINE 1% Cream 1 Application(s) Topical daily  thiamine 100 milliGRAM(s) Oral daily  vancomycin  IVPB 500 milliGRAM(s) IV Intermittent daily    Vital Signs Last 24 Hrs  T(C): 38 (2020 08:00), Max: 38 (2020 08:00)  T(F): 100.4 (2020 08:00), Max: 100.4 (2020 08:00)  HR: 132 (2020 09:00) (88 - 132)  BP: 132/98 (2020 09:00) (124/89 - 164/93)  BP(mean): 104 (2020 09:00) (89 - 111)  RR: 22 (2020 13:53) (22 - 22)  SpO2: 100% (2020 09:00) (88% - 100%)  Mode: AC/ CMV (Assist Control/ Continuous Mandatory Ventilation), RR (machine): 20, TV (machine): 400, FiO2: 30, PEEP: 5, ITime: 1, MAP: 9, PIP: 20   Physical exam:    Constitutional:  No acute distress  HEENT: NC/AT, EOMI, PERRLA, conjunctivae clear  Neck: supple; thyroid not palpable  Back: no tenderness  Respiratory: respiratory effort normal; clear to auscultation  Cardiovascular: S1S2 regular, no murmurs  Abdomen: soft, not tender, not distended, positive BS  Genitourinary: no suprapubic tenderness  Lymphatic: no LN palpable  Musculoskeletal: no muscle tenderness, no joint swelling or tenderness; multiple contusions  Extremities: no pedal edema; left 5th toe ulcer with necrosis  Neurological/ Psychiatric: confused; moving all extremities  Skin: multiple skin ulcers and rashes; no drainage    Labs: reviewed                        9.7    13.84 )-----------( 361      ( 2020 08:23 )             32.0     07-09    150<H>  |  120<H>  |  46<H>  ----------------------------<  106<H>  3.8   |  22  |  0.92    Ca    7.5<L>      2020 08:23                          9.1    23.31 )-----------( 62       ( 2020 05:00 )             26.7     06-30    130<L>  |  94<L>  |  72<H>  ----------------------------<  84  3.5   |  26  |  2.25<H>    Ca    7.0<L>      2020 05:00  Phos  3.8     06-30  Mg     2.0     06-30    TPro  5.4<L>  /  Alb  1.1<L>  /  TBili  0.7  /  DBili  x   /  AST  74<H>  /  ALT  25  /  AlkPhos  362<H>  29    Vancomycin Level, Trough: 29.8 ug/mL ( @ 05:00)                          7.4    8.05  )-----------( 100      ( 2020 07:05 )             21.9     06-24    134<L>  |  104  |  48<H>  ----------------------------<  68<L>  4.8   |  20<L>  |  2.31<H>    Ca    7.2<L>      2020 07:05  Phos  3.3     06-24  Mg     1.1     06-24    TPro  4.8<L>  /  Alb  0.8<L>  /  TBili  1.4<H>  /  DBili  x   /  AST  73<H>  /  ALT  27  /  AlkPhos  298<H>  06-24     LIVER FUNCTIONS - ( 2020 07:05 )  Alb: 0.8 g/dL / Pro: 4.8 gm/dL / ALK PHOS: 298 U/L / ALT: 27 U/L / AST: 73 U/L / GGT: x           Urinalysis Basic - ( 2020 05:41 )    Color: Ale / Appearance: Slightly Turbid / S.010 / pH: x  Gluc: x / Ketone: Negative  / Bili: Negative / Urobili: 1 mg/dL   Blood: x / Protein: 30 mg/dL / Nitrite: Negative   Leuk Esterase: Moderate / RBC: 11-25 /HPF / WBC 3-5   Sq Epi: x / Non Sq Epi: Negative / Bacteria: Many      Culture - Sputum (collected 2020 08:29)  Source: .Sputum Sputum trap  Gram Stain (2020 14:45):    No Squamous epithelial cells per low power field    Moderate polymorphonuclear leukocytes per low power field    Moderate Yeast per oil power field  Preliminary Report (2020 10:05):    Normal Respiratory Elsa present    Culture - Urine (collected 2020 05:41)  Source: .Urine None  Final Report (2020 10:38):    >100,000 CFU/ml Methicillin resistant Staphylococcus aureus    <10,000 CFU/ml Normal Urogenital elsa present  Organism: Methicillin resistant Staphylococcus aureus (2020 10:38)  Organism: Methicillin resistant Staphylococcus aureus (2020 10:38)      -  Ampicillin/Sulbactam: R <=8/4      -  Cefazolin: R 8      -  Daptomycin: S 1      -  Gentamicin: S <=1 Should not be used as monotherapy      -  Linezolid: S 2      -  Oxacillin: R >2      -  Penicillin: R >8      -  RIF- Rifampin: S <=1 Should not be used as monotherapy      -  Tetra/Doxy: S <=1      -  Trimethoprim/Sulfamethoxazole: S <=0.5/9.5      -  Vancomycin: S 2      Method Type: KRYSTEN    Culture - Blood (collected 2020 22:22)  Source: .Blood None  Gram Stain (2020 15:07):    Growth in aerobic bottle: Gram Positive Cocci in Clusters    Growth in anaerobic bottle: Gram Positive Cocci in Clusters  Final Report (2020 10:45):    Growth in aerobic and anaerobic bottles: Methicillin resistant    Staphylococcus aureus  Organism: Blood Culture PCR  Methicillin resistant Staphylococcus aureus (2020 10:45)  Organism: Methicillin resistant Staphylococcus aureus (2020 10:45)      -  Ampicillin/Sulbactam: R <=8/4      -  Cefazolin: R 8      -  Clindamycin: R >4      -  Daptomycin: S 1      -  Erythromycin: R >4      -  Gentamicin: S <=1 Should not be used as monotherapy      -  Linezolid: S 2      -  Oxacillin: R >2      -  Penicillin: R >8      -  RIF- Rifampin: S <=1 Should not be used as monotherapy      -  Tetra/Doxy: S <=1      -  Trimethoprim/Sulfamethoxazole: S <=0.5/9.5      -  Vancomycin: S 2      Method Type: KRYSTEN  Organism: Blood Culture PCR (2020 10:45)      -  Methicillin resistant Staphylococcus aureus (MRSA): Detec      Method Type: PCR    Culture - Blood (collected 2020 22:22)  Source: .Blood None  Gram Stain (2020 17:42):    Growth in aerobic bottle: Gram Positive Cocci in Clusters    Growth in anaerobic bottle: Gram Positive Cocci in Clusters  Final Report (2020 10:50):    Growth in aerobic and anaerobic bottles: Methicillin resistant    Staphylococcus aureus    See previous culture [id number]    Culture - Blood in AM (20 @ 14:30)    Gram Stain:   Growth in aerobic bottle: Yeast like cells    Specimen Source: .Blood Blood-Peripheral    Organism: Blood Culture PCR    Culture Results:   Growth in aerobic bottle: Yeast like cells    Organism Identification: Blood Culture PCR    Method Type: PCR        Radiology: all available radiological tests reviewed    Cardiac Echo:  The left ventricle is normal in size, wall thickness, wall motion and   contractility.   Estimated left ventricular ejection fraction is 60 %.   The right atrium is normal in size.   A catheter is noted in the right atrium.   The aortic valve is trileaflet with thin pliable leaflets.   The mitral valve leaflets appear thickened.   Trace mitral regurgitation is present.   The tricuspid valve leaflets appear mildly thickened and/or calcified, but   open well.   Trace tricuspid valve regurgitation is present.   Trace pulmonic valvular regurgitation is present.    < from: US Abdomen Complete (20 @ 17:46) >  Avascular and mildly echogenic soft tissue mass surrounding portal vein, biliary ducts, and RIGHT hepatic artery of indeterminate etiology. FOLLOW-UP CONTRAST CT SCAN RECOMMENDED.,  Status post cholecystectomy.  Mild splenomegaly.  Fatty infiltration ofliver.    < end of copied text >      Advanced directives addressed: full resuscitation

## 2020-07-09 NOTE — PROGRESS NOTE ADULT - ASSESSMENT
40 y/o female with   epilepsy (last seizure 8 yrs ago) and IVDA admitted with MRSA sepsis and septic shock-   Anemia--likely stress gastritis , was transfused, hgb better, endoscopy no active bleeding  NATALIA has recovered  Acute type1 resp failure with criteria for Mod ARDS, cxr markedly improved  still breathes ain 40s with weaning, trach today  TM Encephalopathy--critical illness neuropathy improving   Chronically ill given Albumin around 1  On vanco Day 14 for bacteremia GRAHAM negative , repeat blood cultures  yeast, on diflucan awaiting ID of yeast  off pressors, awake off sedation  needs trach, peg maybe later today  High risk for acute decompensation and deterioration including death.  Critically ill 40 y/o female with   epilepsy (last seizure 8 yrs ago) and IVDA admitted with MRSA sepsis and septic shock-   Anemia--likely stress gastritis , was transfused, hgb better, endoscopy no active bleeding  NATALIA has recovered  Acute type1 resp failure with criteria for Mod ARDS, cxr markedly improved  still breathes ain 40s with weaning, trach today  TM Encephalopathy--critical illness neuropathy improving   Chronically ill given Albumin around 1  On vanco Day 14 for bacteremia GRAHAM negative , repeat blood cultures  yeast, on diflucan awaiting ID of yeast  off pressors, awake off sedation  needs trach, peg maybe later today  hypernatremia - IV fluids  High risk for acute decompensation and deterioration including death.  Critically ill

## 2020-07-09 NOTE — CONSULT NOTE ADULT - ASSESSMENT
A: 40 y/o female seen by podiatry at bedside for the followin.)Full Thickness Fibronecrotic Ulcer of Right fifth digit  2.)Exposed Peroneal Tendon of RLE that probes to bone  3.)Left Full thickness ulcer distal to lateral Malleolus   4.)Partial Thickness ulcer 4th interspace  5.)Non gradable Eschar of digitis  6.)Pain in B/L Lower Extremities     Plan:  -Chart reviewed and patient evaluated   -Ordered Beside B/L LE X-Rays of foot and ankle  - Ordered Dakins solution to be applied to tunneling LE wounds, with 4x4 gauze and anshul  - Cleaned LE wounds with Saline soaked gauze and applied tefla and kerlix to wound  - Xeroform with Kerlix to be applied to areas of exposed tendon  -Ordered B/L offloading Z-flex boots to be worn at all times  - Recommend Vascular Consult, appreciated  - Discussed with CCU doctor about wounds to B/L LE  - Consult appreciated   - Supportive, noninvasive care by podiatry at this time  - Podiatry will follow while in house Assessment: 40 y/o female seen by podiatry at bedside in the CCU for the followin.) Full Thickness Fibronecrotic Ulcer of Right fifth digit  2.) Full Thickness Fibronecrotic Ulcer with exposed peroneal tendon, lateral aspect RLE  3.) Full thickness ulcer distal to lateral malleolus, LLE  4.) Partial Thickness ulcer 4th interspace, Lt foot  5.) Non gradable Eschar of 3rd digit, Lt foot  6.) Multiple hyperpigmented scar-like lesions scattered all over LE, bl  7.) Pain in B/L Lower Extremities     Plan:  - Chart reviewed and patient evaluated   - Ordered Beside B/L LE X-Rays of foot and ankle  - Ordered Dakins solution to be applied to tunneling LE wounds b/l  - Cleaned LE wounds with Saline soaked gauze and applied xeroform, and DSD  - Xeroform with Kerlix to be applied to areas of exposed tendon daily  - Ordered offloading Z-flex boots to be worn at all times, to the heels b/l.  - Recommend Vascular Consult  - Discussed with CCU doctor the case, and recommendations, appreciated   - At this time, podiatry will provide supportive, and noninvasive wound care to the wounds, b/l lower extremities, given the fact that the patient is deemed in non stable    - Podiatry will follow while in house Assessment: 40 y/o female seen by podiatry at bedside in the CCU for the followin.) Full Thickness Fibronecrotic Ulcer of Right fifth digit  2.) Full Thickness Fibronecrotic Ulcer with exposed peroneal tendon, lateral aspect RLE  3.) Full thickness ulcer distal to lateral malleolus, LLE  4.) Partial Thickness ulcer 4th interspace, Lt foot  5.) Non gradable Eschar of 3rd digit, Lt foot  6.) Multiple hyperpigmented scar-like lesions scattered all over LE, bl  7.) Pain in B/L Lower Extremities     Plan:  - Chart reviewed and patient evaluated   - Ordered Beside B/L LE X-Rays of foot and ankle  - Ordered Dakins solution to be applied to tunneling LE wounds b/l  - Cleaned LE wounds with Saline soaked gauze and applied xeroform, and DSD  - To apply xeroform areas of exposed tendon daily  - Ordered offloading Z-flex boots to be worn at all times, to the heels b/l.  - Recommend Vascular Consult  - Discussed with CCU doctor the case, and recommendations, appreciated   - At this time, podiatry will provide supportive, and noninvasive wound care to the wounds, b/l lower extremities, given the fact that the patient is deemed in non stable    - Podiatry will follow while in house Assessment: 38 y/o female seen by podiatry at bedside in the CCU for the followin.) Full Thickness Fibronecrotic Ulcer of Right fifth digit  2.) Full Thickness Fibronecrotic Ulcer with exposed peroneal tendon, lateral aspect RLE  3.) Full thickness ulcer distal to lateral malleolus, LLE  4.) Partial Thickness ulcer 4th interspace, Lt foot  5.) Non gradable Eschar of 3rd digit, Lt foot  6.) Multiple hyperpigmented scar-like lesions scattered all over LE, bl  7.) Pain in B/L Lower Extremities     Plan:  - Chart reviewed and patient evaluated with attending present   - Ordered Beside B/L LE X-Rays of foot and ankle  - Ordered Dakins solution to be applied to tunneling LE wounds b/l  - Cleaned LE wounds with Saline soaked gauze and applied xeroform, and DSD  - To apply xeroform areas of exposed tendon daily  - Ordered offloading Z-flex boots to be worn at all times, to the heels b/l.  - Recommend Vascular Consult  - Discussed with CCU doctor the case, and recommendations, appreciated   - No podiatric surgical intervention necessary at this time as no abscess collections found to b/l lower extremities   - At this time, podiatry will provide supportive, and noninvasive wound care to the wounds, b/l lower extremities, given the fact that the patient is deemed in non stable    - Podiatry will follow while in house Assessment: 40 y/o female seen by podiatry at bedside in the CCU for the followin.) Full Thickness Fibronecrotic Ulcer of Right fifth digit  2.) Full Thickness Fibronecrotic Ulcer with exposed peroneal tendon, lateral aspect RLE  3.) Full thickness ulcer distal to lateral malleolus, LLE  4.) Partial Thickness ulcer 4th interspace, Lt foot  5.) Non gradable Eschar of 3rd digit, Lt foot  6.) Multiple hyperpigmented scar-like lesions scattered all over LE, bl  7.) Pain in B/L Lower Extremities     Plan:  - Chart reviewed and patient evaluated with attending present   - Ordered Beside B/L LE X-Rays of foot and ankle  - Ordered Dakins solution to be applied to tunneling LE wounds b/l  - Cleaned LE wounds with Saline soaked gauze and applied xeroform, and DSD  - To apply xeroform areas of exposed tendon daily  - Ordered offloading Z-flex boots to be worn at all times, to the heels b/l.  - Recommend Vascular Consult  - Discussed with CCU doctor the case, and recommendations, appreciated   - No podiatric surgical intervention necessary at this time as no abscess collections found to b/l lower extremities   - At this time, podiatry will provide supportive, and noninvasive wound care to the wounds, b/l lower extremities, given the fact that the patient is deemed in non stable medically and at risk for acute decompensation / possible death per the ICU  - Podiatry will follow the case closely while in house Assessment: 40 y/o female seen by podiatry at bedside in the CCU for the followin.) Full Thickness Fibronecrotic Ulcer of Right fifth digit to level of EDL tendon  2.) Full Thickness Fibronecrotic Ulcer with exposed peroneal tendon, lateral aspect RLE  3.) Full thickness ulcer with exposed Achilles tendon LT  4.) Full thickness ulcer to lateral malleolus, LLE  4.) Partial Thickness ulcer 4th interspace, Lt foot  5.) Non gradable Eschar of 3rd digit, Lt foot  6.) Multiple hyperpigmented scar-like lesions scattered all over LE, bl  7.) Pain in B/L Lower Extremities     Plan:  - Chart reviewed and patient evaluated with attending present   - Ordered Beside B/L LE X-Rays of foot and ankle  - Ordered Dakins solution NSWD dressing  distal RT 5th mpj and toe; SSD dressing to be applied to tunneling LE wounds b/l  - Cleaned LE wounds with Saline soaked gauze and applied xeroform, and DSD  - To apply adaptic to areas of exposed tendon daily dressing  - Ordered offloading Z-flex boots to be worn at all times, to the heels b/l.  - Recommend Vascular Consult  - Discussed with CCU doctor the case, and recommendations, appreciated   - No podiatric surgical intervention necessary at this time as no abscess collections found to b/l lower extremities   - At this time, podiatry will provide supportive, and noninvasive wound care to the wounds, b/l lower extremities, given the fact that the patient is deemed in non stable medically and at risk for acute decompensation / possible death per the ICU  - Podiatry will follow the case closely while in house

## 2020-07-09 NOTE — PROGRESS NOTE ADULT - SUBJECTIVE AND OBJECTIVE BOX
Patient is a 39y Female who remains intubated  awake   w fevers    - iv bolus in progress   - GRAHAM neg for SBE     REVIEW OF SYSTEMS:    UTO, int    MEDICATIONS  (STANDING):  chlorhexidine 0.12% Liquid 15 milliLiter(s) Oral Mucosa every 12 hours  collagenase Ointment 1 Application(s) Topical daily  dextrose 5%. 1000 milliLiter(s) (75 mL/Hr) IV Continuous <Continuous>  fluconAZOLE IVPB 400 milliGRAM(s) IV Intermittent every 24 hours  folic acid 1 milliGRAM(s) Oral daily  heparin   Injectable 5000 Unit(s) SubCutaneous every 8 hours  melatonin 5 milliGRAM(s) Oral at bedtime  pantoprazole  Injectable 40 milliGRAM(s) IV Push every 12 hours  silver sulfADIAZINE 1% Cream 1 Application(s) Topical daily  thiamine 100 milliGRAM(s) Oral daily    MEDICATIONS  (PRN):  acetaminophen   Tablet .. 650 milliGRAM(s) Oral every 4 hours PRN Temp greater or equal to 38C (100.4F), Mild Pain (1 - 3)  glucagon  Injectable 1 milliGRAM(s) IntraMuscular once PRN Glucose <70 milliGRAM(s)/deciLiter  hydrOXYzine hydrochloride 50 milliGRAM(s) Oral every 4 hours PRN Anxiety  loperamide 2 milliGRAM(s) Oral every 4 hours PRN Loose stool  LORazepam     Tablet 2 milliGRAM(s) Oral every 6 hours PRN Anxiety  oxyCODONE    IR 5 milliGRAM(s) Oral every 4 hours PRN Moderate Pain (4 - 6)    oxyCODONE    IR 5 milliGRAM(s) Oral every 4 hours PRN Moderate Pain (4 - 6)      Vital Signs Last 24 Hrs  Vital Signs Last 24 Hrs  T(C): 37.4 (2020 15:00), Max: 38.3 (2020 12:00)  T(F): 99.4 (2020 15:00), Max: 101 (2020 12:00)  HR: 141 (2020 15:00) (88 - 151)  BP: 142/115 (2020 15:00) (124/89 - 154/113)  BP(mean): 120 (2020 15:00) (89 - 124)  RR: --  SpO2: 96% (2020 15:00) (88% - 100%)      I&O's Detail    2020 07:01  -  2020 07:00  --------------------------------------------------------  IN:    Enteral Tube Flush: 480 mL    Free Water: 200 mL    Nepro: 360 mL  Total IN: 1040 mL    OUT:    Incontinent per Collection Ba mL  Total OUT: 1000 mL    Total NET: 40 mL          PHYSICAL EXAM:    Constitutional: >then stated age  HEENT: dryMMM  Cardiovascular: S1 and S2  Extremities: upper ext edema+    LE  edema improved   Neurological: A/lert  Skin: No rashes  Access: Not applicable        LABS:                                       9.7    13.84 )-----------( 361      ( 2020 08:23 )             32.0                         6.8    13.05 )-----------( 367      ( 2020 10:25 )             21.9         150    |  120    |  46     ----------------------------<  106       2020 08:23  3.8     |  22     |  0.92     151    |  118    |  49     ----------------------------<  80        2020 07:45  3.8     |  23     |  0.98     148    |  115    |  58     ----------------------------<  87        2020 06:28  4.3     |  24     |  1.01     Ca    7.5        2020 08:23  Ca    7.6        2020 07:45    Phos  4.3       2020 06:28  Phos  4.4       2020 14:30    Mg     2.1       2020 06:28  Mg     2.0       2020 14:30    TPro  6.3    /  Alb  1.3    /  TBili  0.5    /        2020 06:28  DBili  x      /  AST  59     /  ALT  31     /  AlkPhos  306            RADIOLOGY & ADDITIONAL STUDIES:

## 2020-07-10 LAB
ANION GAP SERPL CALC-SCNC: 8 MMOL/L — SIGNIFICANT CHANGE UP (ref 5–17)
APTT BLD: 26.6 SEC — LOW (ref 27.5–35.5)
BUN SERPL-MCNC: 45 MG/DL — HIGH (ref 7–23)
CALCIUM SERPL-MCNC: 7.3 MG/DL — LOW (ref 8.5–10.1)
CHLORIDE SERPL-SCNC: 114 MMOL/L — HIGH (ref 96–108)
CO2 SERPL-SCNC: 21 MMOL/L — LOW (ref 22–31)
CREAT SERPL-MCNC: 1.06 MG/DL — SIGNIFICANT CHANGE UP (ref 0.5–1.3)
CRP SERPL-MCNC: 23.51 MG/DL — HIGH (ref 0–0.4)
ERYTHROCYTE [SEDIMENTATION RATE] IN BLOOD: 109 MM/HR — HIGH (ref 0–15)
GLUCOSE SERPL-MCNC: 90 MG/DL — SIGNIFICANT CHANGE UP (ref 70–99)
HCG UR QL: NEGATIVE — SIGNIFICANT CHANGE UP
HCT VFR BLD CALC: 23.8 % — LOW (ref 34.5–45)
HGB BLD-MCNC: 7.4 G/DL — LOW (ref 11.5–15.5)
INR BLD: 1.21 RATIO — HIGH (ref 0.88–1.16)
MAGNESIUM SERPL-MCNC: 1.8 MG/DL — SIGNIFICANT CHANGE UP (ref 1.6–2.6)
MCHC RBC-ENTMCNC: 28.9 PG — SIGNIFICANT CHANGE UP (ref 27–34)
MCHC RBC-ENTMCNC: 31.1 GM/DL — LOW (ref 32–36)
MCV RBC AUTO: 93 FL — SIGNIFICANT CHANGE UP (ref 80–100)
PHOSPHATE SERPL-MCNC: 5.4 MG/DL — HIGH (ref 2.5–4.5)
PLATELET # BLD AUTO: 321 K/UL — SIGNIFICANT CHANGE UP (ref 150–400)
POTASSIUM SERPL-MCNC: 3.3 MMOL/L — LOW (ref 3.5–5.3)
POTASSIUM SERPL-SCNC: 3.3 MMOL/L — LOW (ref 3.5–5.3)
PROTHROM AB SERPL-ACNC: 14 SEC — HIGH (ref 10.6–13.6)
RBC # BLD: 2.56 M/UL — LOW (ref 3.8–5.2)
RBC # FLD: 17.8 % — HIGH (ref 10.3–14.5)
SODIUM SERPL-SCNC: 143 MMOL/L — SIGNIFICANT CHANGE UP (ref 135–145)
WBC # BLD: 13.72 K/UL — HIGH (ref 3.8–10.5)
WBC # FLD AUTO: 13.72 K/UL — HIGH (ref 3.8–10.5)

## 2020-07-10 PROCEDURE — 99291 CRITICAL CARE FIRST HOUR: CPT

## 2020-07-10 RX ORDER — POTASSIUM CHLORIDE 20 MEQ
10 PACKET (EA) ORAL ONCE
Refills: 0 | Status: COMPLETED | OUTPATIENT
Start: 2020-07-10 | End: 2020-07-10

## 2020-07-10 RX ORDER — OXYCODONE HYDROCHLORIDE 5 MG/1
5 TABLET ORAL EVERY 6 HOURS
Refills: 0 | Status: DISCONTINUED | OUTPATIENT
Start: 2020-07-10 | End: 2020-07-17

## 2020-07-10 RX ORDER — HYDROMORPHONE HYDROCHLORIDE 2 MG/ML
1 INJECTION INTRAMUSCULAR; INTRAVENOUS; SUBCUTANEOUS ONCE
Refills: 0 | Status: DISCONTINUED | OUTPATIENT
Start: 2020-07-10 | End: 2020-07-10

## 2020-07-10 RX ADMIN — Medication 2 MILLIGRAM(S): at 02:48

## 2020-07-10 RX ADMIN — Medication 100 MILLIEQUIVALENT(S): at 11:41

## 2020-07-10 RX ADMIN — Medication 1 MILLIGRAM(S): at 17:43

## 2020-07-10 RX ADMIN — HYDROMORPHONE HYDROCHLORIDE 1 MILLIGRAM(S): 2 INJECTION INTRAMUSCULAR; INTRAVENOUS; SUBCUTANEOUS at 00:57

## 2020-07-10 RX ADMIN — Medication 650 MILLIGRAM(S): at 21:42

## 2020-07-10 RX ADMIN — Medication 1 APPLICATION(S): at 09:00

## 2020-07-10 RX ADMIN — Medication 5 MILLIGRAM(S): at 21:43

## 2020-07-10 RX ADMIN — Medication 650 MILLIGRAM(S): at 02:49

## 2020-07-10 RX ADMIN — FLUCONAZOLE 100 MILLIGRAM(S): 150 TABLET ORAL at 10:46

## 2020-07-10 RX ADMIN — PANTOPRAZOLE SODIUM 40 MILLIGRAM(S): 20 TABLET, DELAYED RELEASE ORAL at 10:48

## 2020-07-10 RX ADMIN — Medication 1 APPLICATION(S): at 10:41

## 2020-07-10 RX ADMIN — Medication 1 APPLICATION(S): at 10:40

## 2020-07-10 RX ADMIN — OXYCODONE HYDROCHLORIDE 5 MILLIGRAM(S): 5 TABLET ORAL at 05:42

## 2020-07-10 RX ADMIN — Medication 1 MILLIGRAM(S): at 10:46

## 2020-07-10 RX ADMIN — CHLORHEXIDINE GLUCONATE 15 MILLILITER(S): 213 SOLUTION TOPICAL at 05:41

## 2020-07-10 RX ADMIN — Medication 650 MILLIGRAM(S): at 13:51

## 2020-07-10 RX ADMIN — HEPARIN SODIUM 5000 UNIT(S): 5000 INJECTION INTRAVENOUS; SUBCUTANEOUS at 21:42

## 2020-07-10 RX ADMIN — OXYCODONE HYDROCHLORIDE 5 MILLIGRAM(S): 5 TABLET ORAL at 21:42

## 2020-07-10 RX ADMIN — CHLORHEXIDINE GLUCONATE 15 MILLILITER(S): 213 SOLUTION TOPICAL at 18:37

## 2020-07-10 RX ADMIN — PANTOPRAZOLE SODIUM 40 MILLIGRAM(S): 20 TABLET, DELAYED RELEASE ORAL at 21:42

## 2020-07-10 NOTE — PROGRESS NOTE ADULT - SUBJECTIVE AND OBJECTIVE BOX
The patient was seen and examined today bedside, the patient still intubated, tachycardic. She is NPO for tracheostomy today. No acute events were reported from the nursing staff.     Vitals:  T(C): 37.4 (07-10 @ 09:36), Max: 38.7 (07-10 @ 00:00)  HR: 123 (07-10 @ 09:15) (123 - 151)  BP: 126/92 (07-10 @ 09:00) (126/92 - 154/113)  RR: --  SpO2: 97% (07-10 @ 09:15) (89% - 100%)     @ 07:01  -  07-10 @ 07:00  --------------------------------------------------------  IN:    dextrose 5%.: 1350 mL    Enteral Tube Flush: 160 mL    Free Water: 200 mL    Lactated Ringers IV Bolus: 1000 mL    Nepro: 140 mL    Solution: 200 mL    Solution: 100 mL  Total IN: 3150 mL    OUT:    Incontinent per Collection Ba mL  Total OUT: 450 mL    Total NET: 2700 mL    Physical Examination:    Cardio: Tachycardic  Resp: On minimal vent settings.  Abdomen: Soft NT,ND.  MSK: FROM                             9.7    13.84 )-----------( 361      ( 2020 08:23 )             32.0         150<H>  |  120<H>  |  46<H>  ----------------------------<  106<H>  3.8   |  22  |  0.92    Ca    7.5<L>      2020 08:23

## 2020-07-10 NOTE — PROGRESS NOTE ADULT - ASSESSMENT
OR for tracheostomy cancelled  Anesthesia has concerns regarding pt's anemia and possible blood loss  Pending optimization, will schedule pt for tracheostomy on 7/13

## 2020-07-10 NOTE — PROGRESS NOTE ADULT - SUBJECTIVE AND OBJECTIVE BOX
Events Overnight: still febrile, blood culture positive for candida dubliniese, had debridement of right foot yesterday, remains on vent    HPI:        40 y/o female with lupus, RA, epilepsy (last seizure 8 yrs ago), IVDA  , who presented to ED  due to weakness. Pt states that her grandmother  approx. 3 months ago and for the past almost 2 months she has been snorting two bags of heroin a day.  Patient developed septic shock , MRSA bacteremia on , Patient was intubated on .  Developed acute renal failure, creatinine has improved, bp is better  anemia, had coffee grounds, old blood on endoscopy, received unit of blood hgb is better   Tachypnea with weaning with respiratory rate to 40s, infiltrates improving on cxr, unable to wean  GRAHAM negative for vegetation  blood culture positive for candida dubllinieses    ROS - cant assess       MEDICATIONS  (STANDING):  chlorhexidine 0.12% Liquid 15 milliLiter(s) Oral Mucosa every 12 hours  collagenase Ointment 1 Application(s) Topical daily  Dakins Solution - 1/4 Strength 1 Application(s) Topical daily  dextrose 5%. 1000 milliLiter(s) (75 mL/Hr) IV Continuous <Continuous>  fluconAZOLE IVPB 400 milliGRAM(s) IV Intermittent every 24 hours  folic acid 1 milliGRAM(s) Oral daily  heparin   Injectable 5000 Unit(s) SubCutaneous every 8 hours  melatonin 5 milliGRAM(s) Oral at bedtime  pantoprazole  Injectable 40 milliGRAM(s) IV Push every 12 hours  silver sulfADIAZINE 1% Cream 1 Application(s) Topical daily  thiamine 100 milliGRAM(s) Oral daily    MEDICATIONS  (PRN):  acetaminophen   Tablet .. 650 milliGRAM(s) Oral every 4 hours PRN Temp greater or equal to 38C (100.4F), Mild Pain (1 - 3)  glucagon  Injectable 1 milliGRAM(s) IntraMuscular once PRN Glucose <70 milliGRAM(s)/deciLiter  hydrOXYzine hydrochloride 50 milliGRAM(s) Oral every 4 hours PRN Anxiety  loperamide 2 milliGRAM(s) Oral every 4 hours PRN Loose stool  LORazepam     Tablet 2 milliGRAM(s) Oral every 6 hours PRN Anxiety  oxyCODONE    IR 5 milliGRAM(s) Oral every 4 hours PRN Moderate Pain (4 - 6)                    ICU Vital Signs Last 24 Hrs  T(C): 37.1 (10 Jul 2020 07:00), Max: 38.7 (10 Jul 2020 00:00)  T(F): 98.8 (10 Jul 2020 07:00), Max: 101.7 (10 Jul 2020 00:00)  HR: 129 (10 Jul 2020 08:00) (125 - 151)  BP: 150/106 (10 Jul 2020 08:00) (132/98 - 154/113)  BP(mean): 115 (10 Jul 2020 08:00) (104 - 124)  ABP: --  ABP(mean): --  RR: --  SpO2: 100% (10 Jul 2020 08:00) (89% - 100%)      Mode: AC/ CMV (Assist Control/ Continuous Mandatory Ventilation)  RR (machine): 15  TV (machine): 400  FiO2: 30  PEEP: 5  ITime: 1  MAP: 13  PIP: 25      I&O's Summary    2020 07:01  -  10 Jul 2020 07:00  --------------------------------------------------------  IN: 3150 mL / OUT: 450 mL / NET: 2700 mL        Physical Exam:                               9.7    13.84 )-----------( 361      ( 2020 08:23 )             32.0       07-09    150<H>  |  120<H>  |  46<H>  ----------------------------<  106<H>  3.8   |  22  |  0.92    Ca    7.5<L>      2020 08:23                      DVT Prophylaxis:                                                                 Advanced Directives: Events Overnight: still febrile, blood culture positive for candida dubliniese, had debridement of right foot yesterday, remains on vent    HPI:        40 y/o female with lupus, RA, epilepsy (last seizure 8 yrs ago), IVDA  , who presented to ED  due to weakness. Pt states that her grandmother  approx. 3 months ago and for the past almost 2 months she has been snorting two bags of heroin a day.  Patient developed septic shock , MRSA bacteremia on , Patient was intubated on .  Developed acute renal failure, creatinine has improved, bp is better  anemia, had coffee grounds, old blood on endoscopy, received unit of blood hgb is better   Tachypnea with weaning with respiratory rate to 40s, infiltrates improving on cxr, unable to wean  GRAHAM negative for vegetation  blood culture positive for candida dubllinieses    ROS - cant assess       MEDICATIONS  (STANDING):  chlorhexidine 0.12% Liquid 15 milliLiter(s) Oral Mucosa every 12 hours  collagenase Ointment 1 Application(s) Topical daily  Dakins Solution - 1/4 Strength 1 Application(s) Topical daily  dextrose 5%. 1000 milliLiter(s) (75 mL/Hr) IV Continuous <Continuous>  fluconAZOLE IVPB 400 milliGRAM(s) IV Intermittent every 24 hours  folic acid 1 milliGRAM(s) Oral daily  heparin   Injectable 5000 Unit(s) SubCutaneous every 8 hours  melatonin 5 milliGRAM(s) Oral at bedtime  pantoprazole  Injectable 40 milliGRAM(s) IV Push every 12 hours  silver sulfADIAZINE 1% Cream 1 Application(s) Topical daily  thiamine 100 milliGRAM(s) Oral daily    MEDICATIONS  (PRN):  acetaminophen   Tablet .. 650 milliGRAM(s) Oral every 4 hours PRN Temp greater or equal to 38C (100.4F), Mild Pain (1 - 3)  glucagon  Injectable 1 milliGRAM(s) IntraMuscular once PRN Glucose <70 milliGRAM(s)/deciLiter  hydrOXYzine hydrochloride 50 milliGRAM(s) Oral every 4 hours PRN Anxiety  loperamide 2 milliGRAM(s) Oral every 4 hours PRN Loose stool  LORazepam     Tablet 2 milliGRAM(s) Oral every 6 hours PRN Anxiety  oxyCODONE    IR 5 milliGRAM(s) Oral every 4 hours PRN Moderate Pain (4 - 6)          ICU Vital Signs Last 24 Hrs  T(C): 37.1 (10 Jul 2020 07:00), Max: 38.7 (10 Jul 2020 00:00)  T(F): 98.8 (10 Jul 2020 07:00), Max: 101.7 (10 Jul 2020 00:00)  HR: 129 (10 Jul 2020 08:00) (125 - 151)  BP: 150/106 (10 Jul 2020 08:00) (132/98 - 154/113)  BP(mean): 115 (10 Jul 2020 08:00) (104 - 124)  ABP: --  ABP(mean): --  RR: --  SpO2: 100% (10 Jul 2020 08:00) (89% - 100%)      Mode: AC/ CMV (Assist Control/ Continuous Mandatory Ventilation)  RR (machine): 15  TV (machine): 400  FiO2: 30  PEEP: 5  ITime: 1  MAP: 13  PIP: 25      I&O's Summary    2020 07:01  -  10 Jul 2020 07:00  --------------------------------------------------------  IN: 3150 mL / OUT: 450 mL / NET: 2700 mL                              9.7    13.84 )-----------( 361      ( 2020 08:23 )             32.0       07-09    150<H>  |  120<H>  |  46<H>  ----------------------------<  106<H>  3.8   |  22  |  0.92    Ca    7.5<L>      2020 08:23        DVT Prophylaxis:  heparin subq                                                               Advanced Directives: full code

## 2020-07-10 NOTE — PROGRESS NOTE ADULT - SUBJECTIVE AND OBJECTIVE BOX
Date of service: 7/10/2020  Chief Complaint: B/L lower extremity wounds     HPI: Pt was examined at bedside by podiatry with attending present, for B/L lower extremity ulcerative lesions. Pt is a 38 yo female who has been admitted to the CCU for septic shock, UTI with MRSA and bacteremia. Noted Pt is currently intubated and non verbal, she is alert and oriented. The patient is also being treated for anemia, acute respiratory failure and toxic metabolic encephalopathy. Of Note, Pt has a history of IVDA drug use and is critically ill and at risk for acute decompensation possible death per the intensivist. Due to the patient being intubated all additional medical history and information was gathered from the patients chart.    7/10: Patient seen and examined by podiatry for follow-up of right and left lower extremity wounds. Patient noted to be intubated in CCU and therefore HPI cannot be obtained. All further HPI obtained from patient's chart. Patient noted to wince when patient's wounds are evaluated by Podiatry.     REVIEW OF SYSTEMS:  Unable to assess due to pt's inability to communicate per intubation    Allergies:  morphine (Unknown)    Past Medical History:  Lupus, RA, Reynard's Epilepsy, IVDA     Family History:  No pertinent family history in first degree relatives: cannot recall family history at this time    Social History:  lives alone  single  smokes 4 cig/day  uses two bags of heroin a day (24 Jun 2020 01:21)    MEDICATIONS  (STANDING):  chlorhexidine 0.12% Liquid 15 milliLiter(s) Oral Mucosa every 12 hours  collagenase Ointment 1 Application(s) Topical daily  Dakins Solution - 1/4 Strength 1 Application(s) Topical daily  dextrose 5%. 1000 milliLiter(s) (75 mL/Hr) IV Continuous <Continuous>  fluconAZOLE IVPB 400 milliGRAM(s) IV Intermittent every 24 hours  folic acid 1 milliGRAM(s) Oral daily  heparin   Injectable 5000 Unit(s) SubCutaneous every 8 hours  melatonin 5 milliGRAM(s) Oral at bedtime  pantoprazole  Injectable 40 milliGRAM(s) IV Push every 12 hours  potassium chloride  10 mEq/100 mL IVPB 10 milliEquivalent(s) IV Intermittent once  silver sulfADIAZINE 1% Cream 1 Application(s) Topical daily  thiamine 100 milliGRAM(s) Oral daily    MEDICATIONS  (PRN):  acetaminophen   Tablet .. 650 milliGRAM(s) Oral every 4 hours PRN Temp greater or equal to 38C (100.4F), Mild Pain (1 - 3)  glucagon  Injectable 1 milliGRAM(s) IntraMuscular once PRN Glucose <70 milliGRAM(s)/deciLiter  hydrOXYzine hydrochloride 50 milliGRAM(s) Oral every 4 hours PRN Anxiety  loperamide 2 milliGRAM(s) Oral every 4 hours PRN Loose stool  LORazepam     Tablet 2 milliGRAM(s) Oral every 6 hours PRN Anxiety  LORazepam   Injectable 1 milliGRAM(s) IV Push every 6 hours PRN Agitation  oxyCODONE    IR 5 milliGRAM(s) Oral every 6 hours PRN Moderate Pain (4 - 6)    Vital Signs Last 24 Hrs  T(C): 37.4 (10 Jul 2020 09:36), Max: 38.7 (10 Jul 2020 00:00)  T(F): 99.3 (10 Jul 2020 09:36), Max: 101.7 (10 Jul 2020 00:00)  HR: 127 (10 Jul 2020 10:00) (123 - 151)  BP: 126/102 (10 Jul 2020 10:00) (126/92 - 154/113)  BP(mean): 108 (10 Jul 2020 10:00) (100 - 124)  RR: --  SpO2: 95% (10 Jul 2020 10:00) (89% - 100%)    08 Jul 2020 07:01  -  09 Jul 2020 07:00  --------------------------------------------------------  IN: 1040 mL / OUT: 1000 mL / NET: 40 mL    Physical examination:  Constitutional: Pt intubated, lying in bed    Focused LE examination:   Vasc: DP and PT pulses non palpable b/l, CFT < 5 x 10, TG: warm to warm  Neuro and MSK: Unable to assess b/c pt is intubated    Derm:  Noted Multiple hyperpigmented scar-like lesions scattered all over LE, bl    Rt LE  - Full thickness ulcer with exposed peroneal tendons on lateral aspect of distal RLE that + probes to Fibula, no drainage, + undermining all around the clock, + tenderness. Negative crepitus or fluctuance to area of lateral aspect of the distal RLE.  - Rt 5th toe demonstrates a full thickness fibronecotic wound measuring approx. 7.2 x 4.6 x 0.8 cm, - purulence, - malodor, - fluctuance +Probe to 5th metatarsal head, EDL tendon exposed and + undermining from the medial aspect, + tunneling to the 9 hour.     Lt LE:  -Full thickness ulcer round in shape and approx  0.5 cm distal to lateral malleolus, tunneling to 3 and 6 o'clock positions, + undermining all around, - drainage, - fluctuance - malodor, + probe to bone to the lateral malleolus, - purulence; negative crepitus and - fluctuance  - Multiple Full thickness ulcerations with exposed Achilles tendon noted to the posterior surface of the left distal 1/3 aspect of the left leg.  -Swelling and mild edema noted over the medial malleolus of LLE,   -Partial thickness ulcer, 4th interdigital space with presence of dry blood, - probe to bone, - undermining - tunneling  -Non gradable eschar on 3rd digit on Lt foot    LABS:   Complete Blood Count (07.10.20 @ 10:20)    WBC Count: 13.72 K/uL    RBC Count: 2.56 M/uL    Hemoglobin: 7.4 g/dL    Hematocrit: 23.8 %    Mean Cell Volume: 93.0 fl    Mean Cell Hemoglobin: 28.9 pg    Mean Cell Hemoglobin Conc: 31.1 gm/dL    Red Cell Distrib Width: 17.8 %    Platelet Count - Automated: 321 K/uL    Basic Metabolic Panel (07.10.20 @ 10:20)    Sodium, Serum: 143 mmol/L    Potassium, Serum: 3.3 mmol/L    Chloride, Serum: 114 mmol/L    Carbon Dioxide, Serum: 21 mmol/L    Anion Gap, Serum: 8 mmol/L    Blood Urea Nitrogen, Serum: 45 mg/dL    Creatinine, Serum: 1.06 mg/dL    Glucose, Serum: 90 mg/dL    Calcium, Total Serum: 7.3 mg/dL    eGFR if Non : 67: Interpretative comment  The units for eGFR are mL/min/1.73M2 (normalized body surface area). The  eGFR is calculated from a serum creatinine using the CKD-EPI equation.  Other variables required for calculation are race, age and sex. Among  patients with chronic kidney disease (CKD), the eGFR is useful in  determining the stage of disease according to KDOQI CKD classification.  All eGFR results are reported numerically with the following  interpretation.          GFR                    With                 Without     (ml/min/1.73 m2)    Kidney Damage       Kidney Damage        >= 90                    Stage 1                     Normal        60-89                    Stage 2                     Decreased GFR        30-59     Stage 3                     Stage 3        15-29                    Stage 4                     Stage 4        < 15                      Stage 5                     Stage 5  Each stage of CKD assumes that the associated GFR level has been in  effect for at least 3 months. Determination of stages one and two (with  eGFR > 59 ml/min/m2) requires estimation of kidney damage for at least 3  months as defined by structural or functional abnormalities.  Limitations: All estimates of GFR will be less accurate for patients at  extremes of muscle mass (including but not limited to frail elderly,  critically ill, or cancer patients), those with unusual diets, and those  with conditions associated with reduced secretion or extrarenal  elimination of creatinine. The eGFR equation is not recommended for use  in patients with unstable creatinine levels. mL/min/1.73M2    eGFR if African American: 77 mL/min/1.73M2    Urine Microscopic-Add On (NC) (06.25.20 @ 21:20)    Bacteria: Many    Comment - Urine: Moderate Trichomonas    Epithelial Cells: Many    Red Blood Cell - Urine: >50 /HPF    White Blood Cell - Urine: 6-10    Blood Culture:   07-06 @ 14:30  Organism Blood Culture PCR  Gram Stain Blood -- Gram Stain   Growth in aerobic bottle: Yeast like cells  Specimen Source .Blood Blood-Peripheral  Culture-Blood --    Culture - Blood in AM (07.06.20 @ 14:30)    -  Methicillin resistant Staphylococcus aureus (MRSA): Nondet    -  Coagulase negative Staphylococcus: Nondet    -  Enterococcus species: Nondet    -  Vancomycin resistant Enterococcus sp.: Nondet    -  Escherichia coli: Nondet    -  Klebsiella oxytoca: Nondet    -  Klebsiella pneumoniae: Nondet    -  Serratia marcescens: Nondet    -  Haemophilus influenzae: Nondet    -  Listeria monocytogenes: Nondet    -  Neisseria meningitidis: Nondet    -  Pseudomonas aeruginosa: Nondet    -  Acinetobacter baumanii: Nondet    -  Enterobacter cloacae complex: Nondet    -  Streptococcus sp. (Not Grp A, B or S pneumoniae): Nondet    -  Streptococcus agalactiae (Group B): Nondet    -  Streptococcus pyogenes (Group A): Nondet    -  Streptococcus pneumoniae: Nondet    -  Candida albicans: Nondet    -  Candida glabrata: Nondet    -  Candida krusei: Nondet    -  Candida parapsilosis: Nondet    -  Candida tropicalis: Nondet    -  Multidrug (KPC pos) resistant organism: Nondet    -  Staphylococcus aureus: Nondet    Gram Stain:   Growth in aerobic bottle: Yeast like cells    Specimen Source: .Blood Blood-Peripheral    Organism: Blood Culture PCR    Culture Results:   Growth in aerobic bottle: Brittny dubliniensis  "Due to technical problems, Proteus sp. will Not be reported as part of  the BCID panel until further notice"  ***Blood Panel PCR results on this specimen are available  approximately 3 hours after the Gram stain result.***  Gram stain, PCR, and/or culture results may not always  correspond due to difference in methodologies.      Imaging:    < from: TTE Echo Complete w/o Contrast w/ Doppler (06.27.20 @ 08:58) >   Impression   Summary   The mid to apical anterolateral to anterior segments are severely   hypokinetic. The apical inferoseptal wall appears hypokinetic.   Estimated left ventricular ejection fraction is 45-50 %.   A catheter wire is seen in the right atrium.   Mild (1+) tricuspid valve regurgitation is present.   Mild pulmonary hypertension.  < end of copied text >      < from: Xray Ankle Complete 3 Views, Bilateral (07.09.20 @ 16:42) >    EXAM:  XR ANKLE COMP MIN 3 VIEWS BI                            *** ADDENDUM 07/10/2020  ***                                                               Addendum:    The posterior soft tissue gas of the left ankle may be related to skin ulcerationor early fistulous tract formation. Clinical correlation is suggested.        *** END OF ADDENDUM 07/10/2020  ***      PROCEDURE DATE:  07/09/2020          INTERPRETATION:  Bilateral ankles    HISTORY: Bilateral ulcers      Three views of the right ankle and three views of the left ankle show no evidence of fracture nor destructive change. The joint spaces are maintained.    Subcutaneous soft tissue gas is present behind the left ankle.    IMPRESSION: Soft tissue gas as noted.    Thank you for this referral.      ***Please see the addendum at the top of this report. It may contain additional important information or changes.****    < end of copied text >

## 2020-07-10 NOTE — PROGRESS NOTE ADULT - ASSESSMENT
38 y/o female with  epilepsy (last seizure 8 yrs ago) and IVDA admitted with MRSA sepsis and septic shock-  Anemia--likely stress gastritis , was transfused, hgb better, endoscopy no active bleeding NATALIA has recovered. Surgery was consulted for Tracheostomy creation     Plan:      The patient is added on for tracheostomy creation today   Keep the patient NPO for surgery today  Get full labs including type and screen.    The plan was discussed with Dr. Suarez

## 2020-07-10 NOTE — PROGRESS NOTE ADULT - ASSESSMENT
Pt seen and examined at bedside. Pt is intubated on mechanical ventilation, pt in no acute distress. Pt has respiratory failure and requires prolonged mechanical ventilation and support. Pt's next of kin, Lukas Mane via telephone, explained the surgical procedure in both medical terminology and in lay terms that was understood, tracheostomy. Pt's next of kin explained in both medical terminology and in lay terms that was understood, the benefits and alternatives of surgery including non-operative management. Pt's next of kin explained at length in both medical terminology and in lay terms that was understood, the associated risks of surgery including but not limited to infection, bleeding, nerve/organ injury, postoperative pain, poor wound healing, scar formation both internally and externally, risk of seroma/hematoma/abcess formation, risk of cardiac/cns/respiratory insult or injury, risk of need for further intervention as required, risk of morbidity and mortality-death. Pt's next of kin understood all of the above. All questions were answered. Pt's next of kin gave informed telephone consent for surgery.

## 2020-07-10 NOTE — PROGRESS NOTE ADULT - ASSESSMENT
38 y/o female with  epilepsy (last seizure 8 yrs ago) and IVDA admitted with MRSA sepsis and septic shock-   Anemia--likely stress gastritis , was transfused, hgb better, endoscopy no active bleeding  NATALIA has recovered  Acute type1 resp failure with criteria for Mod ARDS, cxr markedly improved  still breathes ain 40s with weaning, hopefully today trach today  TM Encephalopathy--critical illness neuropathy improving   Chronically ill given Albumin around 1  On vanco Day 15 for bacteremia GRAHAM negative , repeat blood cultures  yeast, on diflucan  candida dublinieses  off pressors, awake off sedation  hypernatremia - IV fluids  wound care, podiatry input for right foot infection, may need further debridement

## 2020-07-10 NOTE — PROGRESS NOTE ADULT - ASSESSMENT
Assessment: 38 y/o female seen by podiatry at bedside in the CCU for the followin.) Full Thickness Fibronecrotic Ulcer of Right fifth digit  2.) Full Thickness Fibronecrotic Ulcer with exposed peroneal tendon, lateral aspect RLE  3.) Full thickness ulcer with exposed Achilles tendon LT  4.) Full thickness ulcer to lateral malleolus, LLE  4.) Partial Thickness ulcer 4th interspace, Lt foot  5.) Non gradable Eschar of 3rd digit, Lt foot  6.) Multiple hyperpigmented scar-like lesions scattered all over LE, bl  7.) Pain in B/L Lower Extremities     Plan:  - Chart reviewed and patient evaluated.   - X-rays of the b/l LEs reviewed at this time. Case discussed with radiologist in depth. Please note oval radioluciencies visualized in the b/l LEs X-rays are due to air in exposed open wounds.   - All wounds to b/l LEs irrigated with copious amounts of sterile saline.  - Dakins soaked gauzed applied to full-thickness ulceration to right fifth digit.   - Adaptic applied over exposed peroneal tendons to RLE.   - Adaptic applied over exposed Achilles tendon of LLE.   - Silvadene applied to wounds of L 3rd digit and L lateral malleolus ulceration.  - The b/l feet and ankles were wrapped with 4x4 gauze and kerlix and secured with tape.   - Patient's right and left heels to be offloaded in b/l Z-flex boots to be worn at all times when patient bedbound.  - Vascular Consult placed, will f/u on input, appreciated.   - Discussed with CCU doctor the case, and recommendations, appreciated   - No podiatric surgical intervention necessary at this time as no abscess collections found to b/l lower extremities   - At this time, podiatry will provide supportive, and noninvasive wound care to the wounds, b/l lower extremities, given the fact that the patient is deemed in non stable medically and at risk for acute decompensation / possible death per the CCU.  - Podiatry will follow the case closely while in house.

## 2020-07-11 LAB
-  5-FLUCYTOSINE: SIGNIFICANT CHANGE UP
-  AMPHOTERICIN B: SIGNIFICANT CHANGE UP
-  ANIDULAFUNGIN: SIGNIFICANT CHANGE UP
-  CASPOFUNGIN: SIGNIFICANT CHANGE UP
-  FLUCONAZOLE: SIGNIFICANT CHANGE UP
-  INTERPRETATION: SIGNIFICANT CHANGE UP
-  ITRACONAZOLE: SIGNIFICANT CHANGE UP
-  MICAFUNGIN: SIGNIFICANT CHANGE UP
-  POSACONAZOLE: SIGNIFICANT CHANGE UP
-  VORICONAZOLE: SIGNIFICANT CHANGE UP
CULTURE RESULTS: SIGNIFICANT CHANGE UP
METHOD TYPE: SIGNIFICANT CHANGE UP
ORGANISM # SPEC MICROSCOPIC CNT: SIGNIFICANT CHANGE UP
SPECIMEN SOURCE: SIGNIFICANT CHANGE UP

## 2020-07-11 PROCEDURE — 99291 CRITICAL CARE FIRST HOUR: CPT

## 2020-07-11 RX ORDER — DEXMEDETOMIDINE HYDROCHLORIDE IN 0.9% SODIUM CHLORIDE 4 UG/ML
0.4 INJECTION INTRAVENOUS
Qty: 200 | Refills: 0 | Status: DISCONTINUED | OUTPATIENT
Start: 2020-07-11 | End: 2020-07-20

## 2020-07-11 RX ORDER — FAMOTIDINE 10 MG/ML
20 INJECTION INTRAVENOUS
Refills: 0 | Status: DISCONTINUED | OUTPATIENT
Start: 2020-07-11 | End: 2020-07-23

## 2020-07-11 RX ADMIN — Medication 1 MILLIGRAM(S): at 14:09

## 2020-07-11 RX ADMIN — FAMOTIDINE 20 MILLIGRAM(S): 10 INJECTION INTRAVENOUS at 13:39

## 2020-07-11 RX ADMIN — OXYCODONE HYDROCHLORIDE 5 MILLIGRAM(S): 5 TABLET ORAL at 08:06

## 2020-07-11 RX ADMIN — Medication 2 MILLIGRAM(S): at 08:05

## 2020-07-11 RX ADMIN — Medication 5 MILLIGRAM(S): at 21:58

## 2020-07-11 RX ADMIN — HEPARIN SODIUM 5000 UNIT(S): 5000 INJECTION INTRAVENOUS; SUBCUTANEOUS at 13:40

## 2020-07-11 RX ADMIN — Medication 1 APPLICATION(S): at 17:19

## 2020-07-11 RX ADMIN — HEPARIN SODIUM 5000 UNIT(S): 5000 INJECTION INTRAVENOUS; SUBCUTANEOUS at 06:19

## 2020-07-11 RX ADMIN — FAMOTIDINE 20 MILLIGRAM(S): 10 INJECTION INTRAVENOUS at 21:58

## 2020-07-11 RX ADMIN — DEXMEDETOMIDINE HYDROCHLORIDE IN 0.9% SODIUM CHLORIDE 5.44 MICROGRAM(S)/KG/HR: 4 INJECTION INTRAVENOUS at 20:23

## 2020-07-11 RX ADMIN — FLUCONAZOLE 100 MILLIGRAM(S): 150 TABLET ORAL at 13:40

## 2020-07-11 RX ADMIN — HEPARIN SODIUM 5000 UNIT(S): 5000 INJECTION INTRAVENOUS; SUBCUTANEOUS at 21:58

## 2020-07-11 RX ADMIN — OXYCODONE HYDROCHLORIDE 5 MILLIGRAM(S): 5 TABLET ORAL at 13:39

## 2020-07-11 RX ADMIN — Medication 2 MILLIGRAM(S): at 20:42

## 2020-07-11 RX ADMIN — Medication 2 MILLIGRAM(S): at 13:39

## 2020-07-11 RX ADMIN — DEXMEDETOMIDINE HYDROCHLORIDE IN 0.9% SODIUM CHLORIDE 5.44 MICROGRAM(S)/KG/HR: 4 INJECTION INTRAVENOUS at 17:19

## 2020-07-11 RX ADMIN — Medication 1 MILLIGRAM(S): at 13:38

## 2020-07-11 RX ADMIN — Medication 50 MILLIGRAM(S): at 11:29

## 2020-07-11 RX ADMIN — CHLORHEXIDINE GLUCONATE 15 MILLILITER(S): 213 SOLUTION TOPICAL at 06:20

## 2020-07-11 RX ADMIN — Medication 100 MILLIGRAM(S): at 13:39

## 2020-07-11 RX ADMIN — CHLORHEXIDINE GLUCONATE 15 MILLILITER(S): 213 SOLUTION TOPICAL at 17:18

## 2020-07-11 RX ADMIN — Medication 650 MILLIGRAM(S): at 08:07

## 2020-07-11 NOTE — PROGRESS NOTE ADULT - SUBJECTIVE AND OBJECTIVE BOX
Events overnight: trach/PEG cancelled, low grade fevers,      HPI:      40 y/o female with lupus, RA, epilepsy (last seizure 8 yrs ago), IVDA  , who presented to ED  due to weakness. Pt states that her grandmother  approx. 3 months ago and for the past almost 2 months she had been snorting two bags of heroin a day.  Patient developed septic shock , MRSA bacteremia on , Patient was intubated on .  Developed acute renal failure, creatinine has improved, bp is better  anemia, had coffee grounds, old blood on endoscopy, received unit of blood   Tachypnea with weaning with respiratory rate to 40s, infiltrates improving on cxr, unable to wean  GRAHAM negative for vegetation  blood culture positive for candida dubllinieses, fevers better but still low grade  right food wound down to bone    ROS - cant assess    PAST MEDICAL & SURGICAL HISTORY:  Smoker  Heroin abuse  H/O Raynaud's syndrome  Rheumatoid arthritis  Lupus  Gall bladder stones  H/O appendicitis  H/O tubal ligation    FAMILY HISTORY:  No pertinent family history in first degree relatives: cannot recall family history at this time    MEDICATIONS  (STANDING):  chlorhexidine 0.12% Liquid 15 milliLiter(s) Oral Mucosa every 12 hours  collagenase Ointment 1 Application(s) Topical daily  Dakins Solution - 1/4 Strength 1 Application(s) Topical daily  fluconAZOLE IVPB 400 milliGRAM(s) IV Intermittent every 24 hours  folic acid 1 milliGRAM(s) Oral daily  heparin   Injectable 5000 Unit(s) SubCutaneous every 8 hours  melatonin 5 milliGRAM(s) Oral at bedtime  pantoprazole  Injectable 40 milliGRAM(s) IV Push every 12 hours  silver sulfADIAZINE 1% Cream 1 Application(s) Topical daily  thiamine 100 milliGRAM(s) Oral daily    MEDICATIONS  (PRN):  acetaminophen   Tablet .. 650 milliGRAM(s) Oral every 4 hours PRN Temp greater or equal to 38C (100.4F), Mild Pain (1 - 3)  glucagon  Injectable 1 milliGRAM(s) IntraMuscular once PRN Glucose <70 milliGRAM(s)/deciLiter  hydrOXYzine hydrochloride 50 milliGRAM(s) Oral every 4 hours PRN Anxiety  loperamide 2 milliGRAM(s) Oral every 4 hours PRN Loose stool  LORazepam     Tablet 2 milliGRAM(s) Oral every 6 hours PRN Anxiety  LORazepam   Injectable 1 milliGRAM(s) IV Push every 6 hours PRN Agitation  oxyCODONE    IR 5 milliGRAM(s) Oral every 6 hours PRN Moderate Pain (4 - 6)    10 Jul 2020 07:01  -  2020 07:00  --------------------------------------------------------  IN: 1958 mL / OUT: 550 mL / NET: 1408 mL      Vital Signs Last 24 Hrs  T(C): 37.7 (2020 05:33), Max: 38.2 (10 Jul 2020 14:11)  T(F): 99.9 (2020 05:33), Max: 100.7 (10 Jul 2020 14:11)  HR: 124 (2020 07:00) (113 - 138)  BP: 143/93 (:00) (126/92 - 147/103)  BP(mean): 105 (:00) (100 - 113)  RR: 26 (10 Jul 2020 14:36) (26 - 26)  SpO2: 100% (2020 07:) (95% - 100%)  Daily     Daily Weight in k.8 (2020 05:33)      LABS:                        7.4    13.72 )-----------( 321      ( 10 Jul 2020 10:20 )             23.8     07-10    143  |  114<H>  |  45<H>  ----------------------------<  90  3.3<L>   |  21<L>  |  1.06    Ca    7.3<L>      10 Jul 2020 10:20  Phos  5.4     07-10  Mg     1.8     07-10      PT/INR - ( 10 Jul 2020 10:20 )   PT: 14.0 sec;   INR: 1.21 ratio         PTT - ( 10 Jul 2020 10:20 )  PTT:26.6 sec

## 2020-07-11 NOTE — PROGRESS NOTE ADULT - SUBJECTIVE AND OBJECTIVE BOX
Date of service: 7/11/2020  Chief Complaint: B/L lower extremity wounds     HPI: Pt was examined at bedside by podiatry with attending present, for B/L lower extremity ulcerative lesions. Pt is a 40 yo female who has been admitted to the CCU for septic shock, UTI with MRSA and bacteremia. Noted Pt is currently intubated and non verbal, she is alert and oriented. The patient is also being treated for anemia, acute respiratory failure and toxic metabolic encephalopathy. Of Note, Pt has a history of IVDA drug use and is critically ill and at risk for acute decompensation possible death per the intensivist. Due to the patient being intubated all additional medical history and information was gathered from the patients chart.    7/11: Patient seen and examined by podiatry with the attending present; for follow-up of right and left lower extremity wounds. Patient noted to be awake, alert, and intubated in CCU and therefore HPI cannot be obtained. All further HPI obtained from patient's chart. Patient noted to wince when patient's wounds are evaluated by Podiatry.     REVIEW OF SYSTEMS:  Unable to assess due to pt's inability to communicate per intubation    Allergies:  morphine (Unknown)    Past Medical History:  Lupus, RA, Reynard's Epilepsy, IVDA     Family History:  No pertinent family history in first degree relatives: cannot recall family history at this time    Social History:  lives alone  single  smokes 4 cig/day  uses two bags of heroin a day (24 Jun 2020 01:21)    MEDICATIONS  (STANDING):  chlorhexidine 0.12% Liquid 15 milliLiter(s) Oral Mucosa every 12 hours  collagenase Ointment 1 Application(s) Topical daily  Dakins Solution - 1/4 Strength 1 Application(s) Topical daily  famotidine    Tablet 20 milliGRAM(s) Oral two times a day  fluconAZOLE IVPB 400 milliGRAM(s) IV Intermittent every 24 hours  folic acid 1 milliGRAM(s) Oral daily  heparin   Injectable 5000 Unit(s) SubCutaneous every 8 hours  melatonin 5 milliGRAM(s) Oral at bedtime  silver sulfADIAZINE 1% Cream 1 Application(s) Topical daily  thiamine 100 milliGRAM(s) Oral daily    MEDICATIONS  (PRN):  acetaminophen   Tablet .. 650 milliGRAM(s) Oral every 4 hours PRN Temp greater or equal to 38C (100.4F), Mild Pain (1 - 3)  glucagon  Injectable 1 milliGRAM(s) IntraMuscular once PRN Glucose <70 milliGRAM(s)/deciLiter  hydrOXYzine hydrochloride 50 milliGRAM(s) Oral every 4 hours PRN Anxiety  loperamide 2 milliGRAM(s) Oral every 4 hours PRN Loose stool  LORazepam     Tablet 2 milliGRAM(s) Oral every 6 hours PRN Anxiety  LORazepam   Injectable 1 milliGRAM(s) IV Push every 6 hours PRN Agitation  oxyCODONE    IR 5 milliGRAM(s) Oral every 6 hours PRN Moderate Pain (4 - 6)    VITALS:  ICU Vital Signs Last 24 Hrs  T(C): 37.7 (11 Jul 2020 05:33), Max: 38.2 (10 Jul 2020 14:11)  T(F): 99.9 (11 Jul 2020 05:33), Max: 100.7 (10 Jul 2020 14:11)  HR: 127 (11 Jul 2020 08:25) (113 - 138)  BP: 143/93 (11 Jul 2020 08:00) (126/102 - 147/103)  BP(mean): 102 (11 Jul 2020 08:00) (100 - 113)  RR: 26 (10 Jul 2020 14:36) (26 - 26)  SpO2: 100% (11 Jul 2020 08:25) (95% - 100%)      Physical examination:  Constitutional: Pt intubated, lying in bed    Focused LE examination:   Vasc: DP and PT pulses non palpable b/l, CFT < 5 x 10, TG: warm to warm  Neuro and MSK: Unable to assess b/c pt is intubated    Derm:  Noted Multiple hyperpigmented scar-like lesions scattered all over LE, bl    Rt LE  - Full thickness ulcer with exposed peroneal tendons on lateral aspect of distal RLE that + probes to Fibula, no drainage, + undermining all around the clock, + tenderness. Negative crepitus or fluctuance to area of lateral aspect of the distal RLE.  - Rt 5th toe demonstrates a full thickness fibronecotic wound measuring approx. 7.2 x 4.6 x 0.8 cm, - purulence, - malodor, - fluctuance +Probe to 5th metatarsal head, EDL tendon exposed and + undermining from the medial aspect, + tunneling to the 9 hour.     Lt LE:  -Full thickness ulcer round in shape and approx  0.5 cm distal to lateral malleolus, tunneling to 3 and 6 o'clock positions, + undermining all around, - drainage, - fluctuance - malodor, + probe to bone to the lateral malleolus, - purulence; negative crepitus and - fluctuance  - Multiple Full thickness ulcerations with exposed Achilles tendon noted to the posterior surface of the left distal 1/3 aspect of the left leg.  -Swelling and mild edema noted over the medial malleolus of LLE,   -Partial thickness ulcer, 4th interdigital space with presence of dry blood, - probe to bone, - undermining - tunneling  -Non gradable eschar on 3rd digit on Lt foot    LABS:              7.4    13.72 )-----------( 321      ( 10 Jul 2020 10:20 )             23.8     07-10  143  |  114<H>  |  45<H>  ----------------------------<  90  3.3<L>   |  21<L>  |  1.06    Ca    7.3<L>      10 Jul 2020 10:20  Phos  5.4     07-10  Mg     1.8     07-10      Urine Microscopic-Add On (NC) (06.25.20 @ 21:20)    Bacteria: Many    Comment - Urine: Moderate Trichomonas    Epithelial Cells: Many    Red Blood Cell - Urine: >50 /HPF    White Blood Cell - Urine: 6-10    Blood Culture:   07-06 @ 14:30  Organism Blood Culture PCR  Gram Stain Blood -- Gram Stain   Growth in aerobic bottle: Yeast like cells  Specimen Source .Blood Blood-Peripheral  Culture-Blood --    Culture - Blood in AM (07.06.20 @ 14:30)    -  Methicillin resistant Staphylococcus aureus (MRSA): Nondet    -  Coagulase negative Staphylococcus: Nondet    -  Enterococcus species: Nondet    -  Vancomycin resistant Enterococcus sp.: Nondet    -  Escherichia coli: Nondet    -  Klebsiella oxytoca: Nondet    -  Klebsiella pneumoniae: Nondet    -  Serratia marcescens: Nondet    -  Haemophilus influenzae: Nondet    -  Listeria monocytogenes: Nondet    -  Neisseria meningitidis: Nondet    -  Pseudomonas aeruginosa: Nondet    -  Acinetobacter baumanii: Nondet    -  Enterobacter cloacae complex: Nondet    -  Streptococcus sp. (Not Grp A, B or S pneumoniae): Nondet    -  Streptococcus agalactiae (Group B): Nondet    -  Streptococcus pyogenes (Group A): Nondet    -  Streptococcus pneumoniae: Nondet    -  Candida albicans: Nondet    -  Candida glabrata: Nondet    -  Candida krusei: Nondet    -  Candida parapsilosis: Nondet    -  Candida tropicalis: Nondet    -  Multidrug (KPC pos) resistant organism: Nondet    -  Staphylococcus aureus: Nondet    Gram Stain:   Growth in aerobic bottle: Yeast like cells    Specimen Source: .Blood Blood-Peripheral    Organism: Blood Culture PCR    Culture Results:   Growth in aerobic bottle: Brittny dubliniensis  "Due to technical problems, Proteus sp. will Not be reported as part of  the BCID panel until further notice"  ***Blood Panel PCR results on this specimen are available  approximately 3 hours after the Gram stain result.***  Gram stain, PCR, and/or culture results may not always  correspond due to difference in methodologies.      Imaging:    < from: TTE Echo Complete w/o Contrast w/ Doppler (06.27.20 @ 08:58) >   Impression   Summary   The mid to apical anterolateral to anterior segments are severely   hypokinetic. The apical inferoseptal wall appears hypokinetic.   Estimated left ventricular ejection fraction is 45-50 %.   A catheter wire is seen in the right atrium.   Mild (1+) tricuspid valve regurgitation is present.   Mild pulmonary hypertension.  < end of copied text >      < from: Xray Ankle Complete 3 Views, Bilateral (07.09.20 @ 16:42) >  EXAM:  XR ANKLE COMP MIN 3 VIEWS BI                        *** ADDENDUM 07/10/2020  ***                                                           Addendum:    The posterior soft tissue gas of the left ankle may be related to skin ulcerationor early fistulous tract formation. Clinical correlation is suggested.  *** END OF ADDENDUM 07/10/2020  ***  PROCEDURE DATE:  07/09/2020    INTERPRETATION:  Bilateral ankles  HISTORY: Bilateral ulcers    Three views of the right ankle and three views of the left ankle show no evidence of fracture nor destructive change. The joint spaces are maintained.  Subcutaneous soft tissue gas is present behind the left ankle.  IMPRESSION: Soft tissue gas as noted.  Thank you for this referral  ***Please see the addendum at the top of this report. It may contain additional important information or changes.****  < end of copied text >

## 2020-07-11 NOTE — PROGRESS NOTE ADULT - ASSESSMENT
Assessment: 40 y/o female seen by podiatry at bedside in the CCU for the followin.) Full Thickness Fibronecrotic Ulcer of Right fifth digit  2.) Full Thickness Fibronecrotic Ulcer with exposed peroneal tendon, lateral aspect RLE  3.) Full thickness ulcer with exposed Achilles tendon LT  4.) Full thickness ulcer to lateral malleolus, LLE  4.) Partial Thickness ulcer 4th interspace, Lt foot  5.) Non gradable Eschar of 3rd digit, Lt foot  6.) Multiple hyperpigmented scar-like lesions scattered all over LE, bl  7.) Pain in B/L Lower Extremities     Plan:  - Chart reviewed and patient evaluated.   - X-rays of the b/l LEs reviewed at this time. Case discussed with radiologist in depth. Please note oval radioluciencies visualized in the b/l LEs X-rays are due to air in exposed open wounds.   - All wounds to b/l LEs irrigated with copious amounts of sterile saline.  - Dakins soaked gauzed applied to full-thickness ulceration to right fifth digit.   - Adaptic applied over exposed peroneal tendons to RLE.   - Adaptic applied over exposed Achilles tendon of LLE.   - Silvadene applied to wounds of L 3rd digit and L lateral malleolus ulceration.  - The b/l feet and ankles were wrapped with 4x4 gauze and kerlix and secured with tape.   - Patient's right and left heels to be offloaded in b/l Z-flex boots to be worn at all times when patient bedbound.  - Vascular Consult placed, will f/u on input, appreciated.   - Discussed with CCU doctor the case, and recommendations, appreciated   - No podiatric surgical intervention necessary at this time as no abscess collections found to b/l lower extremities   - At this time, podiatry will provide supportive, and noninvasive wound care to the wounds, b/l lower extremities, given the fact that the patient is deemed in non stable medically and at risk for acute decompensation / possible death per the CCU.  - Podiatry will follow the case closely while in house.

## 2020-07-11 NOTE — PROGRESS NOTE ADULT - ASSESSMENT
40 y/o female with  epilepsy (last seizure 8 yrs ago) and IVDA admitted with MRSA sepsis and septic shock-   Anemia--likely stress gastritis , had endoxcopy, no active bleeding, will transfuse, in preparation of trach, was cancelled by anesthesia yesterday for stable hgb of 7.5 and no evidence of bleeding  NATALIA has recovered  Acute type1 resp failure with criteria for Mod ARDS, cxr markedly improved  still breathes ain 40s with weaning, awaiting trach  TM Encephalopathy--critical illness neuropathy improving   Chronically ill given Albumin around 1  On vanco Day 16 for bacteremia GRAHAM negative , Diflucan day 3  fungemia candida lis  off pressors, awake off sedation  hypernatremia - IV fluids  wound care, podiatry input for right foot infection, may need further debridement

## 2020-07-12 LAB
ANION GAP SERPL CALC-SCNC: 7 MMOL/L — SIGNIFICANT CHANGE UP (ref 5–17)
APTT BLD: 24.9 SEC — LOW (ref 27.5–35.5)
BUN SERPL-MCNC: 39 MG/DL — HIGH (ref 7–23)
CALCIUM SERPL-MCNC: 7.2 MG/DL — LOW (ref 8.5–10.1)
CHLORIDE SERPL-SCNC: 113 MMOL/L — HIGH (ref 96–108)
CO2 SERPL-SCNC: 22 MMOL/L — SIGNIFICANT CHANGE UP (ref 22–31)
CREAT SERPL-MCNC: 0.95 MG/DL — SIGNIFICANT CHANGE UP (ref 0.5–1.3)
GLUCOSE SERPL-MCNC: 87 MG/DL — SIGNIFICANT CHANGE UP (ref 70–99)
HCT VFR BLD CALC: 26.3 % — LOW (ref 34.5–45)
HGB BLD-MCNC: 8.4 G/DL — LOW (ref 11.5–15.5)
INR BLD: 1.22 RATIO — HIGH (ref 0.88–1.16)
MAGNESIUM SERPL-MCNC: 1.7 MG/DL — SIGNIFICANT CHANGE UP (ref 1.6–2.6)
MCHC RBC-ENTMCNC: 29.6 PG — SIGNIFICANT CHANGE UP (ref 27–34)
MCHC RBC-ENTMCNC: 31.9 GM/DL — LOW (ref 32–36)
MCV RBC AUTO: 92.6 FL — SIGNIFICANT CHANGE UP (ref 80–100)
PHOSPHATE SERPL-MCNC: 4.2 MG/DL — SIGNIFICANT CHANGE UP (ref 2.5–4.5)
PLATELET # BLD AUTO: 325 K/UL — SIGNIFICANT CHANGE UP (ref 150–400)
POTASSIUM SERPL-MCNC: 3.4 MMOL/L — LOW (ref 3.5–5.3)
POTASSIUM SERPL-SCNC: 3.4 MMOL/L — LOW (ref 3.5–5.3)
PROTHROM AB SERPL-ACNC: 14 SEC — HIGH (ref 10.6–13.6)
RBC # BLD: 2.84 M/UL — LOW (ref 3.8–5.2)
RBC # FLD: 16.3 % — HIGH (ref 10.3–14.5)
SODIUM SERPL-SCNC: 142 MMOL/L — SIGNIFICANT CHANGE UP (ref 135–145)
WBC # BLD: 10.27 K/UL — SIGNIFICANT CHANGE UP (ref 3.8–10.5)
WBC # FLD AUTO: 10.27 K/UL — SIGNIFICANT CHANGE UP (ref 3.8–10.5)

## 2020-07-12 PROCEDURE — 99291 CRITICAL CARE FIRST HOUR: CPT

## 2020-07-12 RX ORDER — POTASSIUM CHLORIDE 20 MEQ
40 PACKET (EA) ORAL ONCE
Refills: 0 | Status: COMPLETED | OUTPATIENT
Start: 2020-07-12 | End: 2020-07-12

## 2020-07-12 RX ADMIN — Medication 1 APPLICATION(S): at 18:41

## 2020-07-12 RX ADMIN — HEPARIN SODIUM 5000 UNIT(S): 5000 INJECTION INTRAVENOUS; SUBCUTANEOUS at 06:03

## 2020-07-12 RX ADMIN — Medication 1 MILLIGRAM(S): at 11:49

## 2020-07-12 RX ADMIN — Medication 100 MILLIGRAM(S): at 11:49

## 2020-07-12 RX ADMIN — CHLORHEXIDINE GLUCONATE 15 MILLILITER(S): 213 SOLUTION TOPICAL at 06:03

## 2020-07-12 RX ADMIN — Medication 40 MILLIEQUIVALENT(S): at 10:01

## 2020-07-12 RX ADMIN — Medication 2 MILLIGRAM(S): at 17:30

## 2020-07-12 RX ADMIN — Medication 2 MILLIGRAM(S): at 08:36

## 2020-07-12 RX ADMIN — Medication 5 MILLIGRAM(S): at 22:13

## 2020-07-12 RX ADMIN — CHLORHEXIDINE GLUCONATE 15 MILLILITER(S): 213 SOLUTION TOPICAL at 18:41

## 2020-07-12 RX ADMIN — HEPARIN SODIUM 5000 UNIT(S): 5000 INJECTION INTRAVENOUS; SUBCUTANEOUS at 16:00

## 2020-07-12 RX ADMIN — Medication 2 MILLIGRAM(S): at 02:53

## 2020-07-12 NOTE — PROGRESS NOTE ADULT - SUBJECTIVE AND OBJECTIVE BOX
Events Overnight: was on cpap but tired and need to be placed back on vent, received one unit of blood yesterday, hgb increased appropirately    HPI:     40 y/o female with lupus, RA, epilepsy (last seizure 8 yrs ago), IVDA , who presented to ED  due to weakness. Pt states that her grandmother  approx. 3 months ago and for the past almost 2 months she had been snorting two bags of heroin a day.  Patient developed septic shock , MRSA bacteremia on , Patient was intubated on .  Developed acute renal failure, creatinine has improved, bp is better  anemia, had coffee grounds, old blood on endoscopy,   Tachypnea with weaning with respiratory rate to 40s, infiltrates improving on cxr, unable to wean  GRAHAM negative for vegetation  blood culture positive for candida dubllinieses, fevers better    right food wound down to tendon    ROS - cant assess    MEDICATIONS  (STANDING):  chlorhexidine 0.12% Liquid 15 milliLiter(s) Oral Mucosa every 12 hours  collagenase Ointment 1 Application(s) Topical daily  Dakins Solution - 1/4 Strength 1 Application(s) Topical daily  dexMEDEtomidine Infusion 0.4 MICROgram(s)/kG/Hr (5.44 mL/Hr) IV Continuous <Continuous>  famotidine    Tablet 20 milliGRAM(s) Oral two times a day  fluconAZOLE IVPB 400 milliGRAM(s) IV Intermittent every 24 hours  folic acid 1 milliGRAM(s) Oral daily  heparin   Injectable 5000 Unit(s) SubCutaneous every 8 hours  melatonin 5 milliGRAM(s) Oral at bedtime  potassium chloride   Powder 40 milliEquivalent(s) Oral once  silver sulfADIAZINE 1% Cream 1 Application(s) Topical daily  thiamine 100 milliGRAM(s) Oral daily    MEDICATIONS  (PRN):  acetaminophen   Tablet .. 650 milliGRAM(s) Oral every 4 hours PRN Temp greater or equal to 38C (100.4F), Mild Pain (1 - 3)  glucagon  Injectable 1 milliGRAM(s) IntraMuscular once PRN Glucose <70 milliGRAM(s)/deciLiter  hydrOXYzine hydrochloride 50 milliGRAM(s) Oral every 4 hours PRN Anxiety  loperamide 2 milliGRAM(s) Oral every 4 hours PRN Loose stool  LORazepam     Tablet 2 milliGRAM(s) Oral every 6 hours PRN Anxiety  LORazepam   Injectable 1 milliGRAM(s) IV Push every 6 hours PRN Agitation  oxyCODONE    IR 5 milliGRAM(s) Oral every 6 hours PRN Moderate Pain (4 - 6)      ICU Vital Signs Last 24 Hrs  T(C): 37 (2020 05:18), Max: 37.7 (2020 13:41)  T(F): 98.6 (2020 05:18), Max: 99.9 (2020 13:41)  HR: 82 (2020 08:00) (76 - 127)  BP: 124/84 (2020 08:00) (101/74 - 152/106)  BP(mean): 94 (2020 08:00) (79 - 118)  ABP: --  ABP(mean): --  RR: --  SpO2: 100% (2020 08:00) (86% - 100%)      I&O's Summary    2020 07:01  -  2020 07:00  --------------------------------------------------------  IN: 1255 mL / OUT: 500 mL / NET: 755 mL                            8.4    10.27 )-----------( 325      ( 2020 03:46 )             26.3       07-12    142  |  113<H>  |  39<H>  ----------------------------<  87  3.4<L>   |  22  |  0.95    Ca    7.2<L>      2020 03:46  Phos  4.2     07-12  Mg     1.7     07-12      DVT Prophylaxis:   Heparin subq                                                              Advanced Directives: Full Code

## 2020-07-12 NOTE — PROGRESS NOTE ADULT - ENMT COMMENTS
Orally intubated
orally intubated; OGT in situ
Orally intubated

## 2020-07-12 NOTE — PROGRESS NOTE ADULT - ASSESSMENT
Assessment: 38 y/o female seen by podiatry at bedside in the CCU for the followin.) Full Thickness Fibronecrotic Ulcer of Right fifth digit  2.) Full Thickness Fibronecrotic Ulcer with exposed peroneal tendon, lateral aspect RLE  3.) Full thickness ulcer with exposed Achilles tendon LT  4.) Full thickness ulcer to lateral malleolus, LLE  4.) Partial Thickness ulcer 4th interspace, Lt foot  5.) Non gradable Eschar of 3rd digit, Lt foot  6.) Multiple hyperpigmented scar-like lesions scattered all over LE, bl  7.) Pain in B/L Lower Extremities     Plan:  - Chart reviewed and patient evaluated, with the attending present.  - X-rays of the b/l LEs reviewed at this time. Case discussed with radiologist in depth. Please note oval radiolucencies visualized in the b/l LEs X-rays are due to air in exposed open wounds.   - All wounds to b/l LEs irrigated with copious amounts of sterile saline.  - Dakins soaked gauzed applied to full-thickness ulceration to right fifth digit.   - Adaptic applied over exposed peroneal tendons to RLE.   - Adaptic applied over exposed Achilles tendon of LLE.   - Silvadene applied to wounds of L 3rd digit and L lateral malleolus ulceration.  - The b/l feet and ankles were wrapped with 4x4 gauze, ABD pad and kerlix, then secured with tape.   - Patient's right and left heels to be offloaded in b/l Z-flex boots to be worn at all times when patient bedbound.  - Vascular Consult placed, will f/u on input, appreciated.   - Discussed with CCU doctor the case, and recommendations, appreciated   - No podiatric surgical intervention necessary at this time as no abscess collections found to b/l lower extremities   - At this time, podiatry will provide supportive, and noninvasive wound care to the wounds, b/l lower extremities, given the fact that the patient is deemed in non stable medically and at risk for acute decompensation.  - Podiatry will follow the case closely while in house.

## 2020-07-12 NOTE — PROGRESS NOTE ADULT - SUBJECTIVE AND OBJECTIVE BOX
Date of service: 7/12/2020  Chief Complaint: B/L lower extremity wounds     HPI: Pt was examined at bedside by podiatry with attending present, for B/L lower extremity ulcerative lesions. Pt is a 38 yo female who has been admitted to the CCU for septic shock, UTI with MRSA and bacteremia. Noted Pt is currently intubated and non verbal, she is alert and oriented. The patient is also being treated for anemia, acute respiratory failure and toxic metabolic encephalopathy. Of Note, Pt has a history of IVDA drug use and is critically ill and at risk for acute decompensation possible death per the intensivist. Due to the patient being intubated all additional medical history and information was gathered from the patients chart.    7/12: Patient seen and examined by podiatry with the attending present; for follow-up of right and left lower extremity wounds. Patient noted to be arousal, and intubated in CCU; therefore HPI cannot be obtained. All further HPI obtained from patient's chart. Patient noted to wince when patient's wounds are evaluated by Podiatry.     REVIEW OF SYSTEMS:  Unable to assess due to pt's inability to communicate per intubation    Allergies:  morphine (Unknown)    Past Medical History:  Lupus, RA, Reynard's Epilepsy, IVDA     Family History:  No pertinent family history in first degree relatives: cannot recall family history at this time    Social History:  lives alone  single  smokes 4 cig/day  uses two bags of heroin a day (24 Jun 2020 01:21)    MEDICATIONS  (STANDING):  chlorhexidine 0.12% Liquid 15 milliLiter(s) Oral Mucosa every 12 hours  collagenase Ointment 1 Application(s) Topical daily  Dakins Solution - 1/4 Strength 1 Application(s) Topical daily  dexMEDEtomidine Infusion 0.4 MICROgram(s)/kG/Hr (5.44 mL/Hr) IV Continuous <Continuous>  famotidine    Tablet 20 milliGRAM(s) Oral two times a day  fluconAZOLE IVPB 400 milliGRAM(s) IV Intermittent every 24 hours  folic acid 1 milliGRAM(s) Oral daily  heparin   Injectable 5000 Unit(s) SubCutaneous every 8 hours  melatonin 5 milliGRAM(s) Oral at bedtime  silver sulfADIAZINE 1% Cream 1 Application(s) Topical daily  thiamine 100 milliGRAM(s) Oral daily      MEDICATIONS  (PRN):  acetaminophen   Tablet .. 650 milliGRAM(s) Oral every 4 hours PRN Temp greater or equal to 38C (100.4F), Mild Pain (1 - 3)  glucagon  Injectable 1 milliGRAM(s) IntraMuscular once PRN Glucose <70 milliGRAM(s)/deciLiter  hydrOXYzine hydrochloride 50 milliGRAM(s) Oral every 4 hours PRN Anxiety  loperamide 2 milliGRAM(s) Oral every 4 hours PRN Loose stool  LORazepam     Tablet 2 milliGRAM(s) Oral every 6 hours PRN Anxiety  LORazepam   Injectable 1 milliGRAM(s) IV Push every 6 hours PRN Agitation  oxyCODONE    IR 5 milliGRAM(s) Oral every 6 hours PRN Moderate Pain (4 - 6)    VITALS:  Vital Signs Last 24 Hrs  T(C): 37.2 (12 Jul 2020 09:24), Max: 37.7 (11 Jul 2020 13:41)  T(F): 99 (12 Jul 2020 09:24), Max: 99.9 (11 Jul 2020 13:41)  HR: 84 (12 Jul 2020 11:00) (76 - 126)  BP: 127/81 (12 Jul 2020 11:00) (101/74 - 152/106)  BP(mean): 91 (12 Jul 2020 11:00) (79 - 118)  SpO2: 100% (12 Jul 2020 11:00) (86% - 100%)    Physical examination:  Constitutional: Pt intubated, lying in bed    Focused LE examination:   Vasc: DP and PT pulses non palpable b/l, CFT < 5 x 10, TG: warm to warm  Neuro and MSK: Unable to assess b/c pt is intubated    Derm:  Noted Multiple hyperpigmented scar-like lesions scattered all over LE, bl    Rt LE  - Full thickness ulcer with exposed peroneal tendons on lateral aspect of distal RLE that + probes to Fibula, no drainage, + undermining all around the clock, + tenderness. Negative crepitus or fluctuance to area of lateral aspect of the distal RLE.  - Rt 5th toe demonstrates a full thickness fibronecotic wound measuring approx. 7.2 x 4.6 x 0.8 cm, - purulence, - malodor, - fluctuance +Probe to 5th metatarsal head, EDL tendon exposed and + undermining from the medial aspect, + tunneling to the 9 hour.     Lt LE:  -Full thickness ulcer round in shape and approx  0.5 cm distal to lateral malleolus, tunneling to 3 and 6 o'clock positions, + undermining all around, - drainage, - fluctuance - malodor, + probe to bone to the lateral malleolus, - purulence; negative crepitus and - fluctuance  - Multiple Full thickness ulcerations with exposed Achilles tendon noted to the posterior surface of the left distal 1/3 aspect of the left leg.  -Swelling and mild edema noted over the medial malleolus of LLE,   -Partial thickness ulcer, 4th interdigital space with presence of dry blood, - probe to bone, - undermining - tunneling  -Non gradable eschar on 3rd digit on Lt foot    LABS:              8.4    10.27 )-----------( 325      ( 12 Jul 2020 03:46 )             26.3     07-12  142  |  113<H>  |  39<H>  ----------------------------<  87  3.4<L>   |  22  |  0.95    Ca    7.2<L>      12 Jul 2020 03:46  Phos  4.2     07-12  Mg     1.7     07-12      Urine Microscopic-Add On (NC) (06.25.20 @ 21:20)    Bacteria: Many    Comment - Urine: Moderate Trichomonas    Epithelial Cells: Many    Red Blood Cell - Urine: >50 /HPF    White Blood Cell - Urine: 6-10    Blood Culture:   07-06 @ 14:30  Organism Blood Culture PCR  Gram Stain Blood -- Gram Stain   Growth in aerobic bottle: Yeast like cells  Specimen Source .Blood Blood-Peripheral  Culture-Blood --    Culture - Blood in AM (07.06.20 @ 14:30)    -  Methicillin resistant Staphylococcus aureus (MRSA): Nondet    -  Coagulase negative Staphylococcus: Nondet    -  Enterococcus species: Nondet    -  Vancomycin resistant Enterococcus sp.: Nondet    -  Escherichia coli: Nondet    -  Klebsiella oxytoca: Nondet    -  Klebsiella pneumoniae: Nondet    -  Serratia marcescens: Nondet    -  Haemophilus influenzae: Nondet    -  Listeria monocytogenes: Nondet    -  Neisseria meningitidis: Nondet    -  Pseudomonas aeruginosa: Nondet    -  Acinetobacter baumanii: Nondet    -  Enterobacter cloacae complex: Nondet    -  Streptococcus sp. (Not Grp A, B or S pneumoniae): Nondet    -  Streptococcus agalactiae (Group B): Nondet    -  Streptococcus pyogenes (Group A): Nondet    -  Streptococcus pneumoniae: Nondet    -  Candida albicans: Nondet    -  Candida glabrata: Nondet    -  Candida krusei: Nondet    -  Candida parapsilosis: Nondet    -  Candida tropicalis: Nondet    -  Multidrug (KPC pos) resistant organism: Nondet    -  Staphylococcus aureus: Nondet    Gram Stain:   Growth in aerobic bottle: Yeast like cells    Specimen Source: .Blood Blood-Peripheral    Organism: Blood Culture PCR    Culture Results:   Growth in aerobic bottle: Brittny dubliniensis  "Due to technical problems, Proteus sp. will Not be reported as part of  the BCID panel until further notice"  ***Blood Panel PCR results on this specimen are available  approximately 3 hours after the Gram stain result.***  Gram stain, PCR, and/or culture results may not always  correspond due to difference in methodologies.      Imaging:    < from: TTE Echo Complete w/o Contrast w/ Doppler (06.27.20 @ 08:58) >   Impression   Summary   The mid to apical anterolateral to anterior segments are severely   hypokinetic. The apical inferoseptal wall appears hypokinetic.   Estimated left ventricular ejection fraction is 45-50 %.   A catheter wire is seen in the right atrium.   Mild (1+) tricuspid valve regurgitation is present.   Mild pulmonary hypertension.  < end of copied text >      < from: Xray Ankle Complete 3 Views, Bilateral (07.09.20 @ 16:42) >  EXAM:  XR ANKLE COMP MIN 3 VIEWS BI                        *** ADDENDUM 07/10/2020  ***                                                           Addendum:    The posterior soft tissue gas of the left ankle may be related to skin ulcerationor early fistulous tract formation. Clinical correlation is suggested.  *** END OF ADDENDUM 07/10/2020  ***  PROCEDURE DATE:  07/09/2020    INTERPRETATION:  Bilateral ankles  HISTORY: Bilateral ulcers    Three views of the right ankle and three views of the left ankle show no evidence of fracture nor destructive change. The joint spaces are maintained.  Subcutaneous soft tissue gas is present behind the left ankle.  IMPRESSION: Soft tissue gas as noted.  Thank you for this referral  ***Please see the addendum at the top of this report. It may contain additional important information or changes.****  < end of copied text >

## 2020-07-12 NOTE — PROGRESS NOTE ADULT - ASSESSMENT
38 y/o female with  epilepsy (last seizure 8 yrs ago) and IVDA admitted with MRSA sepsis and septic shock-   MRSA bacteremit cleared completed 14 day course of vancomycin  On fluconazole day 4 for candida  fungemia  Anemia--likely stress gastritis , had endoxcopy, no active bleeding,   NATALIA has recovered  Acute type1 resp failure with criteria for Mod ARDS, cxr markedly improved , very tachypneic with weaning, trach today  would mobilize and get out of bed after trach  TM Encephalopathy--critical illness neuropathy improving   off pressors, awake off sedation  hypernatremia - IV fluids improved  hypokalemia - replace  wound care, podiatry input for right foot infection, may need further debridement    Patient needs trach, scheduled for today.  There are no medical contraindications for here undergoing procedure  limiting blood draws as patient required fem sticks to get blood will do every 2- 3 days

## 2020-07-13 LAB
ANION GAP SERPL CALC-SCNC: 7 MMOL/L — SIGNIFICANT CHANGE UP (ref 5–17)
BLD GP AB SCN SERPL QL: SIGNIFICANT CHANGE UP
BUN SERPL-MCNC: 36 MG/DL — HIGH (ref 7–23)
CALCIUM SERPL-MCNC: 7.6 MG/DL — LOW (ref 8.5–10.1)
CHLORIDE SERPL-SCNC: 115 MMOL/L — HIGH (ref 96–108)
CO2 SERPL-SCNC: 20 MMOL/L — LOW (ref 22–31)
CREAT SERPL-MCNC: 0.9 MG/DL — SIGNIFICANT CHANGE UP (ref 0.5–1.3)
GLUCOSE SERPL-MCNC: 88 MG/DL — SIGNIFICANT CHANGE UP (ref 70–99)
HCG SERPL-ACNC: <1 MIU/ML — SIGNIFICANT CHANGE UP
HCT VFR BLD CALC: 29.3 % — LOW (ref 34.5–45)
HGB BLD-MCNC: 9.3 G/DL — LOW (ref 11.5–15.5)
MAGNESIUM SERPL-MCNC: 1.8 MG/DL — SIGNIFICANT CHANGE UP (ref 1.6–2.6)
MCHC RBC-ENTMCNC: 29.7 PG — SIGNIFICANT CHANGE UP (ref 27–34)
MCHC RBC-ENTMCNC: 31.7 GM/DL — LOW (ref 32–36)
MCV RBC AUTO: 93.6 FL — SIGNIFICANT CHANGE UP (ref 80–100)
PHOSPHATE SERPL-MCNC: 4.2 MG/DL — SIGNIFICANT CHANGE UP (ref 2.5–4.5)
PLATELET # BLD AUTO: 409 K/UL — HIGH (ref 150–400)
POTASSIUM SERPL-MCNC: 4.1 MMOL/L — SIGNIFICANT CHANGE UP (ref 3.5–5.3)
POTASSIUM SERPL-SCNC: 4.1 MMOL/L — SIGNIFICANT CHANGE UP (ref 3.5–5.3)
RBC # BLD: 3.13 M/UL — LOW (ref 3.8–5.2)
RBC # FLD: 16.8 % — HIGH (ref 10.3–14.5)
SODIUM SERPL-SCNC: 142 MMOL/L — SIGNIFICANT CHANGE UP (ref 135–145)
WBC # BLD: 12.13 K/UL — HIGH (ref 3.8–10.5)
WBC # FLD AUTO: 12.13 K/UL — HIGH (ref 3.8–10.5)

## 2020-07-13 PROCEDURE — 99252 IP/OBS CONSLTJ NEW/EST SF 35: CPT | Mod: GC

## 2020-07-13 PROCEDURE — 71045 X-RAY EXAM CHEST 1 VIEW: CPT | Mod: 26

## 2020-07-13 PROCEDURE — 31600 PLANNED TRACHEOSTOMY: CPT

## 2020-07-13 RX ORDER — HYDROMORPHONE HYDROCHLORIDE 2 MG/ML
1 INJECTION INTRAMUSCULAR; INTRAVENOUS; SUBCUTANEOUS ONCE
Refills: 0 | Status: DISCONTINUED | OUTPATIENT
Start: 2020-07-13 | End: 2020-07-13

## 2020-07-13 RX ORDER — ASCORBIC ACID 60 MG
500 TABLET,CHEWABLE ORAL DAILY
Refills: 0 | Status: DISCONTINUED | OUTPATIENT
Start: 2020-07-13 | End: 2020-07-31

## 2020-07-13 RX ORDER — MAGNESIUM SULFATE 500 MG/ML
2 VIAL (ML) INJECTION ONCE
Refills: 0 | Status: COMPLETED | OUTPATIENT
Start: 2020-07-13 | End: 2020-07-13

## 2020-07-13 RX ORDER — POTASSIUM CHLORIDE 20 MEQ
40 PACKET (EA) ORAL ONCE
Refills: 0 | Status: DISCONTINUED | OUTPATIENT
Start: 2020-07-13 | End: 2020-07-13

## 2020-07-13 RX ADMIN — CHLORHEXIDINE GLUCONATE 15 MILLILITER(S): 213 SOLUTION TOPICAL at 06:07

## 2020-07-13 RX ADMIN — Medication 650 MILLIGRAM(S): at 21:44

## 2020-07-13 RX ADMIN — HYDROMORPHONE HYDROCHLORIDE 1 MILLIGRAM(S): 2 INJECTION INTRAMUSCULAR; INTRAVENOUS; SUBCUTANEOUS at 18:23

## 2020-07-13 RX ADMIN — Medication 1 APPLICATION(S): at 11:30

## 2020-07-13 RX ADMIN — Medication 100 MILLIGRAM(S): at 19:32

## 2020-07-13 RX ADMIN — CHLORHEXIDINE GLUCONATE 15 MILLILITER(S): 213 SOLUTION TOPICAL at 18:11

## 2020-07-13 RX ADMIN — HEPARIN SODIUM 5000 UNIT(S): 5000 INJECTION INTRAVENOUS; SUBCUTANEOUS at 22:34

## 2020-07-13 RX ADMIN — Medication 5 MILLIGRAM(S): at 21:44

## 2020-07-13 RX ADMIN — Medication 500 MILLIGRAM(S): at 19:32

## 2020-07-13 RX ADMIN — Medication 50 GRAM(S): at 12:39

## 2020-07-13 RX ADMIN — Medication 1 TABLET(S): at 19:32

## 2020-07-13 NOTE — PROCEDURE NOTE - NSPROCDETAILS_GEN_ALL_CORE
location identified, feeding tube inserted
patient pre-oxygenated, tube inserted, placement confirmed/connected to ventilator

## 2020-07-13 NOTE — PROGRESS NOTE ADULT - SUBJECTIVE AND OBJECTIVE BOX
Date of service: 7/13/2020  Chief Complaint: B/L lower extremity wounds     HPI: Pt was examined at bedside by podiatry with attending present, for B/L lower extremity ulcerative lesions. Pt is a 40 yo female who has been admitted to the CCU for septic shock, UTI with MRSA and bacteremia. Noted Pt is currently intubated and non verbal, she is alert and oriented. The patient is also being treated for anemia, acute respiratory failure and toxic metabolic encephalopathy. Of Note, Pt has a history of IVDA drug use and is critically ill and at risk for acute decompensation possible death per the intensivist. Due to the patient being intubated all additional medical history and information was gathered from the patients chart.    7/13: Patient seen and examined by podiatry; for follow-up of right and left lower extremity wounds. Patient noted to be intubated in CCU and arousable. Patient noted to be responsive to stimuli when her lower extremities are examined by Podiatry. All further HPI cannot be obtained due to patient's current condition and obtained from patient's chart.     REVIEW OF SYSTEMS:  Unable to assess due to pt's inability to communicate per intubation    Allergies:  morphine (Unknown)    Past Medical History:  Lupus, RA, Reynard's Epilepsy, IVDA     Family History:  No pertinent family history in first degree relatives: cannot recall family history at this time    Social History:  lives alone  single  smokes 4 cig/day  uses two bags of heroin a day (24 Jun 2020 01:21)    MEDICATIONS  (STANDING):  chlorhexidine 0.12% Liquid 15 milliLiter(s) Oral Mucosa every 12 hours  collagenase Ointment 1 Application(s) Topical daily  Dakins Solution - 1/4 Strength 1 Application(s) Topical daily  dexMEDEtomidine Infusion 0.4 MICROgram(s)/kG/Hr (5.44 mL/Hr) IV Continuous <Continuous>  famotidine    Tablet 20 milliGRAM(s) Oral two times a day  fluconAZOLE IVPB 400 milliGRAM(s) IV Intermittent every 24 hours  folic acid 1 milliGRAM(s) Oral daily  heparin   Injectable 5000 Unit(s) SubCutaneous every 8 hours  magnesium sulfate  IVPB 2 Gram(s) IV Intermittent once  melatonin 5 milliGRAM(s) Oral at bedtime  potassium chloride   Powder 40 milliEquivalent(s) Oral once  silver sulfADIAZINE 1% Cream 1 Application(s) Topical daily  thiamine 100 milliGRAM(s) Oral daily    MEDICATIONS  (PRN):  acetaminophen   Tablet .. 650 milliGRAM(s) Oral every 4 hours PRN Temp greater or equal to 38C (100.4F), Mild Pain (1 - 3)  glucagon  Injectable 1 milliGRAM(s) IntraMuscular once PRN Glucose <70 milliGRAM(s)/deciLiter  hydrOXYzine hydrochloride 50 milliGRAM(s) Oral every 4 hours PRN Anxiety  loperamide 2 milliGRAM(s) Oral every 4 hours PRN Loose stool  LORazepam   Injectable 1 milliGRAM(s) IV Push every 6 hours PRN Agitation  oxyCODONE    IR 5 milliGRAM(s) Oral every 6 hours PRN Moderate Pain (4 - 6)    Vital Signs Last 24 Hrs  T(C): 37.6 (13 Jul 2020 08:14), Max: 38.5 (12 Jul 2020 23:00)  T(F): 99.7 (13 Jul 2020 08:14), Max: 101.3 (12 Jul 2020 23:00)  HR: 95 (13 Jul 2020 10:00) (84 - 132)  BP: 127/89 (13 Jul 2020 10:00) (101/68 - 132/90)  BP(mean): 99 (13 Jul 2020 10:00) (75 - 102)  RR: --  SpO2: 100% (13 Jul 2020 10:00) (95% - 100%)    Physical examination:  Constitutional: Pt intubated, lying in bed    Focused LE examination:   Vasc: DP and PT pulses non palpable b/l, CFT < 5 x 10, TG: warm to warm  Neuro and MSK: Unable to assess b/c pt is intubated    Derm:  Noted Multiple hyperpigmented scar-like lesions scattered all over LE, bl    Rt LE  - Full thickness ulcer with exposed peroneal tendons on lateral aspect of distal RLE that + probes to Fibula, no drainage, + undermining all around the clock, + tenderness. Negative crepitus or fluctuance to area of lateral aspect of the distal RLE.  - Rt 5th toe demonstrates a full thickness fibronecotic wound measuring approx. 7.2 x 4.6 x 0.8 cm, - purulence, - malodor, - fluctuance +Probe to 5th metatarsal head, EDL tendon exposed and + undermining from the medial aspect, + tunneling to the 9 hour.     Lt LE:  -Full thickness ulcer round in shape and approx  0.5 cm distal to lateral malleolus, tunneling to 3 and 6 o'clock positions, + undermining all around, - drainage, - fluctuance - malodor, + probe to bone to the lateral malleolus, - purulence; negative crepitus and - fluctuance  - Multiple Full thickness ulcerations with exposed Achilles tendon noted to the posterior surface of the left distal 1/3 aspect of the left leg.  -Swelling and mild edema noted over the medial malleolus of LLE. Edema noted to the dorsal-lateral aspect of the left fifth MPJ, noted to be mostly likely pressure induced; area of black-blue discoloration noted to the the left fifth sub met 5 region.   -Partial thickness ulcer, 4th interdigital space with presence of dry blood, - probe to bone, - undermining - tunneling  - Non gradable eschar on 3rd digit on Lt foot  - Full thickness ulceration noted to the dorsum of the left 1st MPJ. Positive probing to the left 1st MPJ. Positive purulence exuded from the wound with milking of the left foot.      LABS:   Complete Blood Count (07.13.20 @ 10:15)    WBC Count: 12.13 K/uL    RBC Count: 3.13 M/uL    Hemoglobin: 9.3 g/dL    Hematocrit: 29.3 %    Mean Cell Volume: 93.6 fl    Mean Cell Hemoglobin: 29.7 pg    Mean Cell Hemoglobin Conc: 31.7 gm/dL    Red Cell Distrib Width: 16.8 %    Platelet Count - Automated: 409 K/uL    Basic Metabolic Panel - STAT (07.13.20 @ 10:15)    Sodium, Serum: 142 mmol/L    Potassium, Serum: 4.1 mmol/L    Chloride, Serum: 115 mmol/L    Carbon Dioxide, Serum: 20 mmol/L    Anion Gap, Serum: 7 mmol/L    Blood Urea Nitrogen, Serum: 36 mg/dL    Creatinine, Serum: 0.90 mg/dL    Glucose, Serum: 88 mg/dL    Calcium, Total Serum: 7.6 mg/dL    eGFR if Non : 81: Interpretative comment  The units for eGFR are mL/min/1.73M2 (normalized body surface area). The  eGFR is calculated from a serum creatinine using the CKD-EPI equation.  Other variables required for calculation are race, age and sex. Among  patients with chronic kidney disease (CKD), the eGFR is useful in  determining the stage of disease according to KDOQI CKD classification.  All eGFR results are reported numerically with the following  interpretation.          GFR                    With                 Without     (ml/min/1.73 m2)    Kidney Damage       Kidney Damage        >= 90                    Stage 1                     Normal        60-89                    Stage 2                     Decreased GFR        30-59     Stage 3                     Stage 3        15-29                    Stage 4                     Stage 4        < 15                      Stage 5                     Stage 5  Each stage of CKD assumes that the associated GFR level has been in  effect for at least 3 months. Determination of stages one and two (with  eGFR > 59 ml/min/m2) requires estimation of kidney damage for at least 3  months as defined by structural or functional abnormalities.  Limitations: All estimates of GFR will be less accurate for patients at  extremes of muscle mass (including but not limited to frail elderly,  critically ill, or cancer patients), those with unusual diets, and those  with conditions associated with reduced secretion or extrarenal  elimination of creatinine. The eGFR equation is not recommended for use  in patients with unstable creatinine levels. mL/min/1.73M2    eGFR if African American: 94 mL/min/1.73M2    Urine Microscopic-Add On (NC) (06.25.20 @ 21:20)    Bacteria: Many    Comment - Urine: Moderate Trichomonas    Epithelial Cells: Many    Red Blood Cell - Urine: >50 /HPF    White Blood Cell - Urine: 6-10    Blood Culture:   07-06 @ 14:30  Organism Blood Culture PCR  Gram Stain Blood -- Gram Stain   Growth in aerobic bottle: Yeast like cells  Specimen Source .Blood Blood-Peripheral  Culture-Blood --    Culture - Blood in AM (07.06.20 @ 14:30)    -  Methicillin resistant Staphylococcus aureus (MRSA): Nondet    -  Coagulase negative Staphylococcus: Nondet    -  Enterococcus species: Nondet    -  Vancomycin resistant Enterococcus sp.: Nondet    -  Escherichia coli: Nondet    -  Klebsiella oxytoca: Nondet    -  Klebsiella pneumoniae: Nondet    -  Serratia marcescens: Nondet    -  Haemophilus influenzae: Nondet    -  Listeria monocytogenes: Nondet    -  Neisseria meningitidis: Nondet    -  Pseudomonas aeruginosa: Nondet    -  Acinetobacter baumanii: Nondet    -  Enterobacter cloacae complex: Nondet    -  Streptococcus sp. (Not Grp A, B or S pneumoniae): Nondet    -  Streptococcus agalactiae (Group B): Nondet    -  Streptococcus pyogenes (Group A): Nondet    -  Streptococcus pneumoniae: Nondet    -  Candida albicans: Nondet    -  Candida glabrata: Nondet    -  Candida krusei: Nondet    -  Candida parapsilosis: Nondet    -  Candida tropicalis: Nondet    -  Multidrug (KPC pos) resistant organism: Nondet    -  Staphylococcus aureus: Nondet    Gram Stain:   Growth in aerobic bottle: Yeast like cells    Specimen Source: .Blood Blood-Peripheral    Organism: Blood Culture PCR    Culture Results:   Growth in aerobic bottle: Brittny dubliniensis  "Due to technical problems, Proteus sp. will Not be reported as part of  the BCID panel until further notice"  ***Blood Panel PCR results on this specimen are available  approximately 3 hours after the Gram stain result.***  Gram stain, PCR, and/or culture results may not always  correspond due to difference in methodologies.      Imaging:    < from: TTE Echo Complete w/o Contrast w/ Doppler (06.27.20 @ 08:58) >   Impression   Summary   The mid to apical anterolateral to anterior segments are severely   hypokinetic. The apical inferoseptal wall appears hypokinetic.   Estimated left ventricular ejection fraction is 45-50 %.   A catheter wire is seen in the right atrium.   Mild (1+) tricuspid valve regurgitation is present.   Mild pulmonary hypertension.  < end of copied text >      < from: Xray Ankle Complete 3 Views, Bilateral (07.09.20 @ 16:42) >  EXAM:  XR ANKLE COMP MIN 3 VIEWS BI                        *** ADDENDUM 07/10/2020  ***                                                           Addendum:    The posterior soft tissue gas of the left ankle may be related to skin ulcerationor early fistulous tract formation. Clinical correlation is suggested.  *** END OF ADDENDUM 07/10/2020  ***  PROCEDURE DATE:  07/09/2020    INTERPRETATION:  Bilateral ankles  HISTORY: Bilateral ulcers    Three views of the right ankle and three views of the left ankle show no evidence of fracture nor destructive change. The joint spaces are maintained.  Subcutaneous soft tissue gas is present behind the left ankle.  IMPRESSION: Soft tissue gas as noted.  Thank you for this referral  ***Please see the addendum at the top of this report. It may contain additional important information or changes.****  < end of copied text > Date of service: 7/13/2020  Chief Complaint: B/L lower extremity wounds     HPI: Pt was examined at bedside by podiatry with attending present, for B/L lower extremity ulcerative lesions. Pt is a 40 yo female who has been admitted to the CCU for septic shock, UTI with MRSA and bacteremia. Noted Pt is currently intubated and non verbal, she is alert and oriented. The patient is also being treated for anemia, acute respiratory failure and toxic metabolic encephalopathy. Of Note, Pt has a history of IVDA drug use and is critically ill and at risk for acute decompensation possible death per the intensivist. Due to the patient being intubated all additional medical history and information was gathered from the patients chart.    7/13: Patient seen and examined by podiatry for follow-up of right and left lower extremity wounds with attending present in the PM of 7/13/2020. Patient noted be arousable at bedside and patient s/p tracheostomy procedure performed on 7/13/2020. Patient noted to be responsive to stimuli when her lower extremities are examined by Podiatry. All further HPI cannot be obtained due to patient's current condition and obtained from patient's chart.     REVIEW OF SYSTEMS:  Unable to assess due to pt's inability to communicate per intubation    Allergies:  morphine (Unknown)    Past Medical History:  Lupus, RA, Reynard's Epilepsy, IVDA     Family History:  No pertinent family history in first degree relatives: cannot recall family history at this time    Social History:  lives alone  single  smokes 4 cig/day  uses two bags of heroin a day (24 Jun 2020 01:21)    MEDICATIONS  (STANDING):  ascorbic acid 500 milliGRAM(s) Oral daily  chlorhexidine 0.12% Liquid 15 milliLiter(s) Oral Mucosa every 12 hours  collagenase Ointment 1 Application(s) Topical daily  Dakins Solution - 1/4 Strength 1 Application(s) Topical daily  dexMEDEtomidine Infusion 0.4 MICROgram(s)/kG/Hr (5.44 mL/Hr) IV Continuous <Continuous>  doxycycline hyclate Capsule 100 milliGRAM(s) Oral every 12 hours  famotidine    Tablet 20 milliGRAM(s) Oral two times a day  fluconAZOLE IVPB 400 milliGRAM(s) IV Intermittent every 24 hours  folic acid 1 milliGRAM(s) Oral daily  heparin   Injectable 5000 Unit(s) SubCutaneous every 8 hours  melatonin 5 milliGRAM(s) Oral at bedtime  multivitamin 1 Tablet(s) Oral daily  silver sulfADIAZINE 1% Cream 1 Application(s) Topical daily  thiamine 100 milliGRAM(s) Oral daily    MEDICATIONS  (PRN):  acetaminophen   Tablet .. 650 milliGRAM(s) Oral every 4 hours PRN Temp greater or equal to 38C (100.4F), Mild Pain (1 - 3)  glucagon  Injectable 1 milliGRAM(s) IntraMuscular once PRN Glucose <70 milliGRAM(s)/deciLiter  hydrOXYzine hydrochloride 50 milliGRAM(s) Oral every 4 hours PRN Anxiety  loperamide 2 milliGRAM(s) Oral every 4 hours PRN Loose stool  LORazepam   Injectable 1 milliGRAM(s) IV Push every 6 hours PRN Agitation  oxyCODONE    IR 5 milliGRAM(s) Oral every 6 hours PRN Moderate Pain (4 - 6)    Vital Signs Last 24 Hrs  T(C): 37.6 (13 Jul 2020 08:14), Max: 38.5 (12 Jul 2020 23:00)  T(F): 99.7 (13 Jul 2020 08:14), Max: 101.3 (12 Jul 2020 23:00)  HR: 75 (13 Jul 2020 12:00) (75 - 132)  BP: 123/78 (13 Jul 2020 12:00) (101/68 - 132/90)  BP(mean): 99 (13 Jul 2020 10:00) (75 - 102)  RR: --  SpO2: 100% (13 Jul 2020 13:00) (95% - 100%)    Physical examination:  Constitutional: Pt intubated, lying in bed    Focused LE examination:   Vasc: DP and PT pulses non palpable b/l, CFT < 5 x 10, TG: warm to warm  Neuro and MSK: Unable to assess b/c pt is intubated    Derm:  Noted Multiple hyperpigmented scar-like lesions scattered all over LE, bl    Rt LE  - Full thickness ulcer with exposed peroneal tendons on lateral aspect of distal RLE that + probes to Fibula, no drainage, + undermining all around the clock, + tenderness. Negative crepitus or fluctuance to area of lateral aspect of the distal RLE.  - Rt 5th toe demonstrates a full thickness fibronecotic wound measuring approx. 7.2 x 4.6 x 0.8 cm, - purulence, - malodor, - fluctuance +Probe to 5th metatarsal head, EDL tendon exposed and + undermining from the medial aspect, + tunneling to the 9 hour.     Lt LE:  -Full thickness ulcer round in shape and approx  0.5 cm distal to lateral malleolus, tunneling to 3 and 6 o'clock positions, + undermining all around, - drainage, - fluctuance - malodor, + probe to bone to the lateral malleolus, - purulence; negative crepitus and - fluctuance  - Multiple Full thickness ulcerations with exposed Achilles tendon noted to the posterior surface of the left distal 1/3 aspect of the left leg.  -Swelling and mild edema noted over the medial malleolus of LLE. Edema noted to the dorsal-lateral aspect of the left fifth MPJ, noted to be mostly likely pressure induced; area of black-blue discoloration noted to the the left fifth sub met 5 region.   -Partial thickness ulcer, 4th interdigital space with presence of dry blood, - probe to bone, - undermining - tunneling  - Non gradable eschar on 3rd digit on Lt foot  - Full thickness ulceration noted to the dorsum of the left 1st MPJ. Positive probing to the left 1st MPJ. Positive purulence exuded from the wound with milking of the left foot.      LABS:   Complete Blood Count (07.13.20 @ 10:15)    WBC Count: 12.13 K/uL    RBC Count: 3.13 M/uL    Hemoglobin: 9.3 g/dL    Hematocrit: 29.3 %    Mean Cell Volume: 93.6 fl    Mean Cell Hemoglobin: 29.7 pg    Mean Cell Hemoglobin Conc: 31.7 gm/dL    Red Cell Distrib Width: 16.8 %    Platelet Count - Automated: 409 K/uL    Basic Metabolic Panel - STAT (07.13.20 @ 10:15)    Sodium, Serum: 142 mmol/L    Potassium, Serum: 4.1 mmol/L    Chloride, Serum: 115 mmol/L    Carbon Dioxide, Serum: 20 mmol/L    Anion Gap, Serum: 7 mmol/L    Blood Urea Nitrogen, Serum: 36 mg/dL    Creatinine, Serum: 0.90 mg/dL    Glucose, Serum: 88 mg/dL    Calcium, Total Serum: 7.6 mg/dL    eGFR if Non : 81: Interpretative comment  The units for eGFR are mL/min/1.73M2 (normalized body surface area). The  eGFR is calculated from a serum creatinine using the CKD-EPI equation.  Other variables required for calculation are race, age and sex. Among  patients with chronic kidney disease (CKD), the eGFR is useful in  determining the stage of disease according to KDOQI CKD classification.  All eGFR results are reported numerically with the following  interpretation.          GFR                    With                 Without     (ml/min/1.73 m2)    Kidney Damage       Kidney Damage        >= 90                    Stage 1                     Normal        60-89                    Stage 2                     Decreased GFR        30-59     Stage 3                     Stage 3        15-29                    Stage 4                     Stage 4        < 15                      Stage 5                     Stage 5  Each stage of CKD assumes that the associated GFR level has been in  effect for at least 3 months. Determination of stages one and two (with  eGFR > 59 ml/min/m2) requires estimation of kidney damage for at least 3  months as defined by structural or functional abnormalities.  Limitations: All estimates of GFR will be less accurate for patients at  extremes of muscle mass (including but not limited to frail elderly,  critically ill, or cancer patients), those with unusual diets, and those  with conditions associated with reduced secretion or extrarenal  elimination of creatinine. The eGFR equation is not recommended for use  in patients with unstable creatinine levels. mL/min/1.73M2    eGFR if African American: 94 mL/min/1.73M2    Urine Microscopic-Add On (NC) (06.25.20 @ 21:20)    Bacteria: Many    Comment - Urine: Moderate Trichomonas    Epithelial Cells: Many    Red Blood Cell - Urine: >50 /HPF    White Blood Cell - Urine: 6-10    Blood Culture:   07-06 @ 14:30  Organism Blood Culture PCR  Gram Stain Blood -- Gram Stain   Growth in aerobic bottle: Yeast like cells  Specimen Source .Blood Blood-Peripheral  Culture-Blood --    Culture - Blood in AM (07.06.20 @ 14:30)    -  Methicillin resistant Staphylococcus aureus (MRSA): Nondet    -  Coagulase negative Staphylococcus: Nondet    -  Enterococcus species: Nondet    -  Vancomycin resistant Enterococcus sp.: Nondet    -  Escherichia coli: Nondet    -  Klebsiella oxytoca: Nondet    -  Klebsiella pneumoniae: Nondet    -  Serratia marcescens: Nondet    -  Haemophilus influenzae: Nondet    -  Listeria monocytogenes: Nondet    -  Neisseria meningitidis: Nondet    -  Pseudomonas aeruginosa: Nondet    -  Acinetobacter baumanii: Nondet    -  Enterobacter cloacae complex: Nondet    -  Streptococcus sp. (Not Grp A, B or S pneumoniae): Nondet    -  Streptococcus agalactiae (Group B): Nondet    -  Streptococcus pyogenes (Group A): Nondet    -  Streptococcus pneumoniae: Nondet    -  Candida albicans: Nondet    -  Candida glabrata: Nondet    -  Candida krusei: Nondet    -  Candida parapsilosis: Nondet    -  Candida tropicalis: Nondet    -  Multidrug (KPC pos) resistant organism: Nondet    -  Staphylococcus aureus: Nondet    Gram Stain:   Growth in aerobic bottle: Yeast like cells    Specimen Source: .Blood Blood-Peripheral    Organism: Blood Culture PCR    Culture Results:   Growth in aerobic bottle: Brittny dubliniensis  "Due to technical problems, Proteus sp. will Not be reported as part of  the BCID panel until further notice"  ***Blood Panel PCR results on this specimen are available  approximately 3 hours after the Gram stain result.***  Gram stain, PCR, and/or culture results may not always  correspond due to difference in methodologies.      Imaging:    < from: TTE Echo Complete w/o Contrast w/ Doppler (06.27.20 @ 08:58) >   Impression   Summary   The mid to apical anterolateral to anterior segments are severely   hypokinetic. The apical inferoseptal wall appears hypokinetic.   Estimated left ventricular ejection fraction is 45-50 %.   A catheter wire is seen in the right atrium.   Mild (1+) tricuspid valve regurgitation is present.   Mild pulmonary hypertension.  < end of copied text >      < from: Xray Ankle Complete 3 Views, Bilateral (07.09.20 @ 16:42) >  EXAM:  XR ANKLE COMP MIN 3 VIEWS BI                        *** ADDENDUM 07/10/2020  ***                                                           Addendum:    The posterior soft tissue gas of the left ankle may be related to skin ulcerationor early fistulous tract formation. Clinical correlation is suggested.  *** END OF ADDENDUM 07/10/2020  ***  PROCEDURE DATE:  07/09/2020    INTERPRETATION:  Bilateral ankles  HISTORY: Bilateral ulcers    Three views of the right ankle and three views of the left ankle show no evidence of fracture nor destructive change. The joint spaces are maintained.  Subcutaneous soft tissue gas is present behind the left ankle.  IMPRESSION: Soft tissue gas as noted.  Thank you for this referral  ***Please see the addendum at the top of this report. It may contain additional important information or changes.****  < end of copied text >

## 2020-07-13 NOTE — CONSULT NOTE ADULT - ASSESSMENT
39yoF with bilateral LE wounds and gangrene of the R. 5th digit    Plan:  Please obtain bilateral Lower Extremity Arterial Duplex  Continue local wound care as per primary team  Medical management as per primary team    Plan discussed with Dr. Lynch

## 2020-07-13 NOTE — PROGRESS NOTE ADULT - ASSESSMENT
Assessment: 38 y/o female seen by podiatry at bedside in the CCU for the followin.) Full Thickness Fibronecrotic Ulcer of Right fifth digit  2.) Full Thickness Fibronecrotic Ulcer with exposed peroneal tendon, lateral aspect RLE  3.) Full thickness ulcer with exposed Achilles tendon LT  4.) Full thickness ulcer to lateral malleolus, LLE  5) Full thickness ulceration, dorsum of left 1st MTPJ  6) Partial Thickness ulcer 4th interspace, Lt foot  7) Non gradable Eschar of 3rd digit, Lt foot  8) Multiple hyperpigmented scar-like lesions scattered all over LE, bl  9) Pain in B/L Lower Extremities     Plan:  - Chart reviewed and patient evaluated by Podiatry team.  - X-rays of the b/l LEs reviewed at this time. Case discussed with radiologist in depth. Please note oval radiolucencies visualized in the b/l LEs X-rays are due to air in exposed open wounds.   - All wounds to b/l LEs irrigated with copious amounts of sterile saline.  - Dakins soaked gauzed applied to full-thickness ulceration to right fifth digit.   - Adaptic applied over exposed peroneal tendons to RLE.   - Adaptic applied over exposed Achilles tendon of LLE.   - Silvadene applied to wounds of L 3rd digit and L lateral malleolus ulceration.  - Wound cx taken of left 1st MTPJ full-thickness ulceration, will f/u on results once available.  - The b/l feet and ankles were wrapped with 4x4 gauze, ABD pad and kerlix, then secured with tape.   - Patient's right and left heels to be offloaded in b/l Z-flex boots to be worn at all times when patient bedbound.  - Vascular Consult placed, will f/u on input, appreciated.   - Will f/u with ID department given purulence exuded from full thickness ulceration to left 1st MPJ and that deep wound cx was taken.  - Discussed with CCU doctor the case, and recommendations, appreciated   - No podiatric surgical intervention necessary at this time as no abscess collections found to b/l lower extremities   - At this time, podiatry will provide supportive, and noninvasive wound care to the wounds, b/l lower extremities, given the fact that the patient is deemed in non stable medically and at risk for acute decompensation.  - Podiatry will follow the case closely while in house. Assessment: 38 y/o female seen by podiatry at bedside in the CCU for the followin.) Full Thickness Fibronecrotic Ulcer of Right fifth digit  2.) Full Thickness Fibronecrotic Ulcer with exposed peroneal tendon, lateral aspect RLE  3.) Full thickness ulcer with exposed Achilles tendon LT  4.) Full thickness ulcer to lateral malleolus, LLE  5) Full thickness ulceration, dorsum of left 1st MTPJ  6) Partial Thickness ulcer 4th interspace, Lt foot  7) Non gradable Eschar of 3rd digit, Lt foot  8) Multiple hyperpigmented scar-like lesions scattered all over LE, bl  9) Pain in B/L Lower Extremities     Plan:  - Chart reviewed and patient evaluated by Podiatry team with attending present.  - X-rays of the b/l LEs reviewed at this time. Case discussed with radiologist in depth. Please note oval radiolucencies visualized in the b/l LEs X-rays are due to air in exposed open wounds.   - All wounds to b/l LEs irrigated with copious amounts of sterile saline.  - Dakins soaked gauzed applied to full-thickness ulceration to right fifth digit.   - Adaptic applied over exposed peroneal tendons to RLE.   - Adaptic applied over exposed Achilles tendon of LLE.   - Silvadene applied to wounds of L 3rd digit and L lateral malleolus ulceration.  - Wound cx taken of left 1st MTPJ full-thickness ulceration, will f/u on results once available.  - The b/l feet and ankles were wrapped with 4x4 gauze, ABD pad and kerlix, then secured with tape.   - Patient's right and left heels to be offloaded in b/l Z-flex boots to be worn at all times when patient bedbound.  - Vascular Consult placed, will f/u on input, appreciated.   - Discussed case with ID department given purulence exuded from full thickness ulceration to left 1st MPJ and that deep wound cx was taken.  - Discussed with CCU doctor the case, and recommendations, appreciated   - No podiatric surgical intervention necessary at this time as no abscess collections found to b/l lower extremities   - At this time, podiatry will provide supportive, and noninvasive wound care to the wounds, b/l lower extremities, given the fact that the patient is deemed in non stable medically and at risk for acute decompensation.  - Podiatry will follow the case closely while in house.

## 2020-07-13 NOTE — PROGRESS NOTE ADULT - ASSESSMENT
40 y/o female with lupus, RA, epilepsy (last seizure 8 yrs ago), IVDA was clean and recently began snorting 2 bags of heroin daily, who presented to ED due to weakness, failure to thrive. Found to have acute anemia, NATALIA, transaminitis, metabolic lactic acidosis, severe dehydration, severe protein calorie malnutrition. Patient was found to have severe sepsis with septic shock secondary to MRSA bacteremia and UTI, developed worsening respiratory failure and was ultimately intubated on 6/27. Also found to be fungemic.    Neuro: IVDA heroin addiction, weaning off opiates, treating opiate withdrawal symptomatically. Severe debility, PT consult placed    Pulm: Continues to require full vent support, failed multiple attempts at SBT due to overall debility and severe malnutrition. Plan for tracheostomy today.    CV: Shock resolved    GI: Tube feeds at goal, folate and thiamine supplementation. Add MVI and ascorbic acid to aid in wound healing. Will need GI consult for PEG placement.    Renal: NATALIA resolving, still with prerenal azotemia. Monitoring renal indices, I&Os. Supplementing electrolytes as needed, give Mg 2g IVPB today. Hypokalemia resolved    ID: Still febrile intermittently with Tmax 101.3F, no lines no reynoso. MRSA bacteremia resolved, blood cultures cleared, completed 14 day course of Vancomycin. On Diflucan for fungemia. GRAHAM with no evidence of AIE. D/w ID- started on Doxycycline for infected foot wounds, suspect MRSA as culprit. Underwent bedside debridement by Podiatry today, cultures sent f/u results. Wound care per pods/ wound care  recommendations    Heme: Heparin SC for DVT ppx. Anemia stable, give lab holiday tomorrow. 38 y/o female with lupus, RA, epilepsy (last seizure 8 yrs ago), IVDA was clean and recently began snorting 2 bags of heroin daily, who presented to ED due to weakness, failure to thrive. Found to have acute anemia, NATALIA, transaminitis, metabolic lactic acidosis, severe dehydration, severe protein calorie malnutrition. Patient was found to have severe sepsis with septic shock secondary to MRSA bacteremia and UTI, developed worsening respiratory failure and was ultimately intubated on 6/27. Also found to be fungemic.    Neuro: IVDA heroin addiction, weaning off opiates, treating opiate withdrawal symptomatically. Severe debility, PT consult placed    Pulm: Continues to require full vent support, failed multiple attempts at SBT due to overall debility and severe malnutrition. Plan for tracheostomy today.    CV: Shock resolved    GI: Tube feeds at goal, folate and thiamine supplementation. Add MVI and ascorbic acid to aid in wound healing. Will need GI consult for PEG placement.    Renal: NATALIA resolving, still with prerenal azotemia. Monitoring renal indices, I&Os. Supplementing electrolytes as needed, give Mg 2g IVPB today. Hypokalemia resolved    ID: Still febrile intermittently with Tmax 101.3F, no lines no reynoso. MRSA bacteremia resolved, blood cultures cleared, completed 14 day course of Vancomycin. On Diflucan for fungemia. GRAHAM with no evidence of AIE. D/w ID- started on Doxycycline for infected foot wounds, suspect MRSA as culprit. Underwent bedside debridement by Podiatry today, cultures sent f/u results. Wound care per pods/ wound care recommendations. Vascular consult requested by Podiatry to assess distal perfusion.    Heme: Heparin SC for DVT ppx. Anemia stable, give lab holiday tomorrow.

## 2020-07-13 NOTE — CONSULT NOTE ADULT - SUBJECTIVE AND OBJECTIVE BOX
38yo F with Hx of Lupus, RA, RAynauds, Epilepsy, IVDA admitted for cellulitis/sepsis/NATALIA/anemia of  and found to have MRSA bacteremia. Hospital course includes decompensation on the floor requiring intubation. Patient is now POD#0 s/p tracheostomy insertion. Patient is non-verbal but will nod head in response to questioning and exhibits understanding    PAST MEDICAL & SURGICAL HISTORY:  Smoker  Heroin abuse  H/O Raynaud's syndrome  Rheumatoid arthritis  Lupus  Gall bladder stones  H/O appendicitis  H/O tubal ligation    Vitals:  T(C): 36.8 ( @ 16:47), Max: 38.5 ( @ 23:00)  HR: 114 ( @ 21:00) (75 - 132)  BP: 114/76 ( @ 21:00) (101/68 - 142/90)  RR: --  SpO2: 100% ( @ 21:00) (95% - 100%)     @ 07:01  -   @ 07:00  --------------------------------------------------------  IN:    dexmedetomidine Infusion: 206 mL    Solution: 202 mL  Total IN: 408 mL    OUT:    Incontinent per Collection Ba mL  Total OUT: 400 mL    Total NET: 8 mL       @ 07:01  -   @ 21:16  --------------------------------------------------------  IN:    dexmedetomidine Infusion: 78 mL  Total IN: 78 mL    OUT:  Total OUT: 0 mL    Total NET: 78 mL        Physical Examination:  Gen: NAD  Cardio: S1, S2 in tact, RRR  Resp: Equal air entry b/L  Abdomen: Soft NTND  MSK: FROM (passive); Palpable Right DP 2+, Dopplerable Right PT and Left DP/PT  Neuro/Psych: Normal Speech, Normal Tone     @ 10:15                    9.3  CBC: 12.13>)-------(<409                     29.3                 142 | 115 | 36    CMP:  ----------------------< 88               4.1 | 20 | 0.90                      Ca:7.6  Phos:4.2  M.8        Current Inpatient Medications:  acetaminophen   Tablet .. 650 milliGRAM(s) Oral every 4 hours PRN  ascorbic acid 500 milliGRAM(s) Oral daily  chlorhexidine 0.12% Liquid 15 milliLiter(s) Oral Mucosa every 12 hours  collagenase Ointment 1 Application(s) Topical daily  Dakins Solution - 1/4 Strength 1 Application(s) Topical daily  dexMEDEtomidine Infusion 0.4 MICROgram(s)/kG/Hr (5.44 mL/Hr) IV Continuous <Continuous>  doxycycline hyclate Capsule 100 milliGRAM(s) Oral every 12 hours  famotidine    Tablet 20 milliGRAM(s) Oral two times a day  fluconAZOLE IVPB 400 milliGRAM(s) IV Intermittent every 24 hours  folic acid 1 milliGRAM(s) Oral daily  glucagon  Injectable 1 milliGRAM(s) IntraMuscular once PRN  heparin   Injectable 5000 Unit(s) SubCutaneous every 8 hours  hydrOXYzine hydrochloride 50 milliGRAM(s) Oral every 4 hours PRN  loperamide 2 milliGRAM(s) Oral every 4 hours PRN  LORazepam   Injectable 1 milliGRAM(s) IV Push every 6 hours PRN  melatonin 5 milliGRAM(s) Oral at bedtime  multivitamin 1 Tablet(s) Oral daily  oxyCODONE    IR 5 milliGRAM(s) Oral every 6 hours PRN  silver sulfADIAZINE 1% Cream 1 Application(s) Topical daily  thiamine 100 milliGRAM(s) Oral daily

## 2020-07-13 NOTE — PROCEDURE NOTE - ADDITIONAL PROCEDURE DETAILS
indications: acute respiratory failure with hypoxia, severe protein calorie malnutrition
Dx: acute hypoxemic respiratory failure, AMS, sepsis, bacteremia

## 2020-07-13 NOTE — CONSULT NOTE ADULT - ATTENDING COMMENTS
37 year old female with bilateral lower extremity wounds  Likely combination of transient peripheral hypoperfusion from pressors and Lupus associated small vessel disease.  Arterial duplex shows no significant arterial occlusion bilaterally  She does not require any vascular intervention.  Continue care per podiatry
I agree with A&P. Please note there is no clinical evidence of abscess at this time.

## 2020-07-13 NOTE — PROGRESS NOTE ADULT - SUBJECTIVE AND OBJECTIVE BOX
Patient is a 39y old  Female who presents with a chief complaint of Cellulitis with sepsis, symptomatic anemia. (2020 10:46)      BRIEF HOSPITAL COURSE: 40 y/o female with lupus, RA, epilepsy (last seizure 8 yrs ago), IVDA was clean and recently began snorting 2 bags of heroin daily, who presented to ED due to weakness, failure to thrive. Found to have acute anemia,  Patient developed septic shock secondary to MRSA bacteremia on , worsening respiratory failure and was ultimately intubated on . Also found to be fungemic.      Events last 24 hours: Remains intubated on full vent support, awake and interactive, attempts to mouth words to make needs known. Severely debilitated.        PAST MEDICAL & SURGICAL HISTORY:  Smoker  Heroin abuse  H/O Raynaud's syndrome  Rheumatoid arthritis  Lupus  Gall bladder stones  H/O appendicitis  H/O tubal ligation          SOCIAL HISTORY:        Review of Systems:  CONSTITUTIONAL: No fever, chills, or fatigue  EYES: No visual disturbances  ENMT:  No difficulty hearing  NECK: No pain  RESPIRATORY: No cough. No shortness of breath  CARDIOVASCULAR: No chest pain, palpitations, or leg swelling  GASTROINTESTINAL: No abdominal pain. No nausea, vomiting, diarrhea, or constipation. No hematemesis, melena or hematochezia  GENITOURINARY: No dysuria, frequency, hematuria, or incontinence  NEUROLOGICAL: No headaches, loss of strength, numbness, or tremors  SKIN: No rashes  MUSCULOSKELETAL: No back or extremity pain. No calf pain  PSYCHIATRIC: No depression, anxiety, or difficulty sleeping      [  ] Due to altered mental status/intubation, subjective information was not able to be obtained from the patient. History was obtained, to the extent possible, from review of the chart and collateral sources of information.        Medications:  doxycycline hyclate Capsule 100 milliGRAM(s) Oral every 12 hours  fluconAZOLE IVPB 400 milliGRAM(s) IV Intermittent every 24 hours        acetaminophen   Tablet .. 650 milliGRAM(s) Oral every 4 hours PRN  dexMEDEtomidine Infusion 0.4 MICROgram(s)/kG/Hr IV Continuous <Continuous>  hydrOXYzine hydrochloride 50 milliGRAM(s) Oral every 4 hours PRN  LORazepam   Injectable 1 milliGRAM(s) IV Push every 6 hours PRN  melatonin 5 milliGRAM(s) Oral at bedtime  oxyCODONE    IR 5 milliGRAM(s) Oral every 6 hours PRN      heparin   Injectable 5000 Unit(s) SubCutaneous every 8 hours    famotidine    Tablet 20 milliGRAM(s) Oral two times a day  loperamide 2 milliGRAM(s) Oral every 4 hours PRN      glucagon  Injectable 1 milliGRAM(s) IntraMuscular once PRN    folic acid 1 milliGRAM(s) Oral daily  thiamine 100 milliGRAM(s) Oral daily      chlorhexidine 0.12% Liquid 15 milliLiter(s) Oral Mucosa every 12 hours  collagenase Ointment 1 Application(s) Topical daily  Dakins Solution - 1/4 Strength 1 Application(s) Topical daily  silver sulfADIAZINE 1% Cream 1 Application(s) Topical daily        Mode: AC/ CMV (Assist Control/ Continuous Mandatory Ventilation)  RR (machine): 15  TV (machine): 400  FiO2: 30  PEEP: 5  ITime: 1  MAP: 10  PIP: 24      ICU Vital Signs Last 24 Hrs  T(C): 37.6 (2020 08:14), Max: 38.5 (2020 23:00)  T(F): 99.7 (2020 08:14), Max: 101.3 (2020 23:00)  HR: 75 (2020 12:00) (75 - 132)  BP: 123/78 (2020 12:00) (101/68 - 132/90)  BP(mean): 99 (2020 10:00) (75 - 102)  ABP: --  ABP(mean): --  RR: --  SpO2: 100% (2020 13:00) (95% - 100%)          I&O's Detail    2020 07:01  -  2020 07:00  --------------------------------------------------------  IN:    dexmedetomidine Infusion: 206 mL    Solution: 202 mL  Total IN: 408 mL    OUT:    Incontinent per Collection Ba mL  Total OUT: 400 mL    Total NET: 8 mL            LABS:                        9.3    12.13 )-----------( 409      ( 2020 10:15 )             29.3     07-13    142  |  115<H>  |  36<H>  ----------------------------<  88  4.1   |  20<L>  |  0.90    Ca    7.6<L>      2020 10:15  Phos  4.2     07-13  Mg     1.8     07-13            CAPILLARY BLOOD GLUCOSE        PT/INR - ( 2020 03:46 )   PT: 14.0 sec;   INR: 1.22 ratio         PTT - ( 2020 03:46 )  PTT:24.9 sec    CULTURES:            Physical Examination:    General: No acute distress.      HEENT: Pupils equal, reactive to light.  Symmetric.    PULM: Clear to auscultation bilaterally, no significant sputum production    CVS: Regular rate and rhythm, no murmurs, rubs, or gallops    ABD: Soft, nondistended, nontender, normoactive bowel sounds, no masses    EXT: No edema, nontender    SKIN: Warm and well perfused, no rashes noted.    NEURO: Alert, oriented, interactive, nonfocal        RADIOLOGY: ***        INVASIVE LINES:  INDWELLING MARI:  VTE PROPHYLAXIS:  CAM ICU:  CODE STATUS:        CRITICAL CARE TIME SPENT: *** minutes spent performing frequent bedside reassessments and augmenting plan of care to address problems of acute critical illness that pose high probability of life threatening deterioration and/or end organ damage/dysfunction and discussing goals of care, non-inclusive of time spent on procedures performed. Patient is a 39y old  Female who presents with a chief complaint of Cellulitis with sepsis, symptomatic anemia. (2020 10:46)      BRIEF HOSPITAL COURSE: 38 y/o female with lupus, RA, epilepsy (last seizure 8 yrs ago), IVDA was clean and recently began snorting 2 bags of heroin daily, who presented to ED due to weakness, failure to thrive. Found to have acute anemia, NATALIA, transaminitis, metabolic lactic acidosis, severe dehydration, severe protein calorie malnutrition. Patient was found to have severe sepsis with septic shock secondary to MRSA bacteremia and UTI treated with a two-week course of Vancomycin, developed worsening respiratory failure and was ultimately intubated on . Also found to be fungemic on Diflucan.      Events last 24 hours: Remains intubated on full vent support, awake and interactive, attempts to mouth words to make needs known. Severely debilitated.        PAST MEDICAL & SURGICAL HISTORY:  Smoker  Heroin abuse  H/O Raynaud's syndrome  Rheumatoid arthritis  Lupus  Gall bladder stones  H/O appendicitis  H/O tubal ligation          SOCIAL HISTORY: IVDA        Review of Systems: Due to intubation, subjective information was not able to be obtained from the patient. History was obtained, to the extent possible, from review of the chart and collateral sources of information.        Medications:  doxycycline hyclate Capsule 100 milliGRAM(s) Oral every 12 hours  fluconAZOLE IVPB 400 milliGRAM(s) IV Intermittent every 24 hours  acetaminophen   Tablet .. 650 milliGRAM(s) Oral every 4 hours PRN  dexMEDEtomidine Infusion 0.4 MICROgram(s)/kG/Hr IV Continuous <Continuous>  hydrOXYzine hydrochloride 50 milliGRAM(s) Oral every 4 hours PRN  LORazepam   Injectable 1 milliGRAM(s) IV Push every 6 hours PRN  melatonin 5 milliGRAM(s) Oral at bedtime  oxyCODONE    IR 5 milliGRAM(s) Oral every 6 hours PRN  heparin   Injectable 5000 Unit(s) SubCutaneous every 8 hours  famotidine    Tablet 20 milliGRAM(s) Oral two times a day  loperamide 2 milliGRAM(s) Oral every 4 hours PRN  glucagon  Injectable 1 milliGRAM(s) IntraMuscular once PRN  folic acid 1 milliGRAM(s) Oral daily  thiamine 100 milliGRAM(s) Oral daily  chlorhexidine 0.12% Liquid 15 milliLiter(s) Oral Mucosa every 12 hours  collagenase Ointment 1 Application(s) Topical daily  Dakins Solution - 1/4 Strength 1 Application(s) Topical daily  silver sulfADIAZINE 1% Cream 1 Application(s) Topical daily        Mode: AC/ CMV (Assist Control/ Continuous Mandatory Ventilation)  RR (machine): 15  TV (machine): 400  FiO2: 30  PEEP: 5  ITime: 1  MAP: 10  PIP: 24        ICU Vital Signs Last 24 Hrs  T(C): 37.6 (2020 08:14), Max: 38.5 (2020 23:00)  T(F): 99.7 (2020 08:14), Max: 101.3 (2020 23:00)  HR: 75 (2020 12:00) (75 - 132)  BP: 123/78 (2020 12:00) (101/68 - 132/90)  BP(mean): 99 (2020 10:00) (75 - 102)  ABP: --  ABP(mean): --  RR: --  SpO2: 100% (2020 13:00) (95% - 100%)          I&O's Detail    2020 07:01  -  2020 07:00  --------------------------------------------------------  IN:    dexmedetomidine Infusion: 206 mL    Solution: 202 mL  Total IN: 408 mL    OUT:    Incontinent per Collection Ba mL  Total OUT: 400 mL    Total NET: 8 mL            LABS:                        9.3    12.13 )-----------( 409      ( 2020 10:15 )             29.3     07-13    142  |  115<H>  |  36<H>  ----------------------------<  88  4.1   |  20<L>  |  0.90    Ca    7.6<L>      2020 10:15  Phos  4.2     07-13  Mg     1.8     07-13            CAPILLARY BLOOD GLUCOSE        PT/INR - ( 2020 03:46 )   PT: 14.0 sec;   INR: 1.22 ratio         PTT - ( 2020 03:46 )  PTT:24.9 sec    CULTURES:            Physical Examination:    General: Tearful, anxious    HEENT: Pupils equal, reactive to light.  Symmetric.    PULM: Clear to auscultation bilaterally    CVS: Regular rate and rhythm, no murmurs, rubs, or gallops    ABD: Soft, nondistended, nontender, normoactive bowel sounds, no masses    EXT: No edema, nontender    SKIN: Diffuse track marks over all four extremities with areas of skin breakdown and scar tissue, b/l foot wounds wrapped in bandages.    NEURO: Alert, interactive RASS 0        RADIOLOGY: prior images reviewed         INVASIVE LINES: X   INDWELLING MARI: X   VTE PROPHYLAXIS: Heparin SC   CAM ICU: -  CODE STATUS: FULL        CRITICAL CARE TIME SPENT: 30 minutes spent performing frequent bedside reassessments and augmenting plan of care to address problems of acute critical illness that pose high probability of life threatening deterioration and/or end organ damage/dysfunction and discussing goals of care, non-inclusive of time spent on procedures performed.

## 2020-07-13 NOTE — PROCEDURE NOTE - NSINDICATIONS_GEN_A_CORE
respiratory distress/feeding difficulties
airway protection/mental status change/critical patient/respiratory failure

## 2020-07-14 PROCEDURE — 93925 LOWER EXTREMITY STUDY: CPT | Mod: 26

## 2020-07-14 PROCEDURE — 99253 IP/OBS CNSLTJ NEW/EST LOW 45: CPT

## 2020-07-14 RX ORDER — ENOXAPARIN SODIUM 100 MG/ML
40 INJECTION SUBCUTANEOUS DAILY
Refills: 0 | Status: DISCONTINUED | OUTPATIENT
Start: 2020-07-14 | End: 2020-07-31

## 2020-07-14 RX ORDER — ALPRAZOLAM 0.25 MG
0.5 TABLET ORAL EVERY 6 HOURS
Refills: 0 | Status: DISCONTINUED | OUTPATIENT
Start: 2020-07-14 | End: 2020-07-21

## 2020-07-14 RX ADMIN — CHLORHEXIDINE GLUCONATE 15 MILLILITER(S): 213 SOLUTION TOPICAL at 21:13

## 2020-07-14 RX ADMIN — HEPARIN SODIUM 5000 UNIT(S): 5000 INJECTION INTRAVENOUS; SUBCUTANEOUS at 05:59

## 2020-07-14 RX ADMIN — Medication 650 MILLIGRAM(S): at 21:13

## 2020-07-14 RX ADMIN — Medication 650 MILLIGRAM(S): at 12:14

## 2020-07-14 RX ADMIN — Medication 1 MILLIGRAM(S): at 09:24

## 2020-07-14 RX ADMIN — Medication 100 MILLIGRAM(S): at 21:13

## 2020-07-14 RX ADMIN — ENOXAPARIN SODIUM 40 MILLIGRAM(S): 100 INJECTION SUBCUTANEOUS at 12:37

## 2020-07-14 RX ADMIN — Medication 0.1 MILLIGRAM(S): at 21:13

## 2020-07-14 RX ADMIN — Medication 0.5 MILLIGRAM(S): at 21:12

## 2020-07-14 RX ADMIN — Medication 500 MILLIGRAM(S): at 09:24

## 2020-07-14 RX ADMIN — Medication 650 MILLIGRAM(S): at 09:23

## 2020-07-14 RX ADMIN — Medication 1 TABLET(S): at 09:24

## 2020-07-14 RX ADMIN — Medication 5 MILLIGRAM(S): at 21:13

## 2020-07-14 RX ADMIN — Medication 0.1 MILLIGRAM(S): at 12:37

## 2020-07-14 RX ADMIN — Medication 100 MILLIGRAM(S): at 05:59

## 2020-07-14 RX ADMIN — CHLORHEXIDINE GLUCONATE 15 MILLILITER(S): 213 SOLUTION TOPICAL at 05:59

## 2020-07-14 RX ADMIN — Medication 100 MILLIGRAM(S): at 09:24

## 2020-07-14 RX ADMIN — Medication 1 APPLICATION(S): at 12:13

## 2020-07-14 RX ADMIN — Medication 50 MILLIGRAM(S): at 09:23

## 2020-07-14 RX ADMIN — Medication 1 APPLICATION(S): at 09:25

## 2020-07-14 NOTE — PHYSICAL THERAPY INITIAL EVALUATION ADULT - ADDITIONAL COMMENTS
As per nursing, Lived with grandmother and grandmother recently passed away. Due to covid situation was unable to get meds for RA and started heroin again. Parents are not in the picture and family not involved in care. Has a  but . He was named her health care proxy.

## 2020-07-14 NOTE — PHYSICAL THERAPY INITIAL EVALUATION ADULT - PERTINENT HX OF CURRENT PROBLEM, REHAB EVAL
recently began snorting 2 bags of heroin daily, who presented to ED due to weakness, failure to thrive. Found to have acute anemia, NATALIA, transaminitis, metabolic lactic acidosis, severe dehydration, severe protein calorie malnutrition. Patient was found to have severe sepsis with septic shock secondary to MRSA bacteremia and UTI treated with a two-week course of Vancomycin, developed worsening respiratory failure and was ultimately intubated on 6/27. Also found to be fungemic on Diflucan.

## 2020-07-14 NOTE — PROGRESS NOTE ADULT - SUBJECTIVE AND OBJECTIVE BOX
Date of service: 20 @ 08:59    Lying in bed in NAD  Alert  Trying to interact  Dose not seem in pain  s/p tracheostomy  Has low grade fever    ROS: poorly verbal    MEDICATIONS  (STANDING):  ascorbic acid 500 milliGRAM(s) Oral daily  chlorhexidine 0.12% Liquid 15 milliLiter(s) Oral Mucosa every 12 hours  collagenase Ointment 1 Application(s) Topical daily  Dakins Solution - 1/4 Strength 1 Application(s) Topical daily  dexMEDEtomidine Infusion 0.4 MICROgram(s)/kG/Hr (5.44 mL/Hr) IV Continuous <Continuous>  doxycycline hyclate Capsule 100 milliGRAM(s) Oral every 12 hours  famotidine    Tablet 20 milliGRAM(s) Oral two times a day  fluconAZOLE IVPB 400 milliGRAM(s) IV Intermittent every 24 hours  folic acid 1 milliGRAM(s) Oral daily  heparin   Injectable 5000 Unit(s) SubCutaneous every 8 hours  melatonin 5 milliGRAM(s) Oral at bedtime  multivitamin 1 Tablet(s) Oral daily  silver sulfADIAZINE 1% Cream 1 Application(s) Topical daily  thiamine 100 milliGRAM(s) Oral daily    Vital Signs Last 24 Hrs  T(C): 38.1 (2020 05:00), Max: 38.4 (2020 22:00)  T(F): 100.6 (2020 05:00), Max: 101.1 (2020 22:00)  HR: 124 (2020 08:48) (75 - 135)  BP: 118/86 (2020 08:00) (93/68 - 142/90)  BP(mean): 93 (2020 08:00) (73 - 101)  RR: --  SpO2: 100% (2020 08:48) (92% - 100%)  Mode: AC/ CMV (Assist Control/ Continuous Mandatory Ventilation), RR (machine): 15, TV (machine): 400, FiO2: 30, PEEP: 5, ITime: 1, PIP: 24   Physical exam:    Physical exam:    Constitutional:  No acute distress  HEENT: NC/AT, EOMI, PERRLA, conjunctivae clear  Neck: supple; thyroid not palpable  Back: no tenderness  Respiratory: respiratory effort normal; clear to auscultation  Cardiovascular: S1S2 regular, no murmurs  Abdomen: soft, not tender, not distended, positive BS  Genitourinary: no suprapubic tenderness  Lymphatic: no LN palpable  Musculoskeletal: no muscle tenderness, no joint swelling or tenderness; multiple contusions  Extremities: no pedal edema; left 5th toe ulcer with necrosis  Neurological/ Psychiatric: confused; moving all extremities  Skin: multiple skin ulcers and rashes; no drainage; right 5th MTP ulcer    Labs: reviewed                        9.3    12.13 )-----------( 409      ( 2020 10:15 )             29.3     07-13    142  |  115<H>  |  36<H>  ----------------------------<  88  4.1   |  20<L>  |  0.90    Ca    7.6<L>      2020 10:15  Phos  4.2     07-13  Mg     1.8     13    C-Reactive Protein, Serum: 23.51 mg/dL (07-10-20 @ 10:20)                        9.1    23.31 )-----------( 62       ( 2020 05:00 )             26.7     06-30    130<L>  |  94<L>  |  72<H>  ----------------------------<  84  3.5   |  26  |  2.25<H>    Ca    7.0<L>      2020 05:00  Phos  3.8     06-30  Mg     2.0     0630    TPro  5.4<L>  /  Alb  1.1<L>  /  TBili  0.7  /  DBili  x   /  AST  74<H>  /  ALT  25  /  AlkPhos  362<H>  -29    Vancomycin Level, Trough: 29.8 ug/mL ( @ 05:00)                          7.4    8.05  )-----------( 100      ( 2020 07:05 )             21.9     06-24    134<L>  |  104  |  48<H>  ----------------------------<  68<L>  4.8   |  20<L>  |  2.31<H>    Ca    7.2<L>      2020 07:05  Phos  3.3     06-24  Mg     1.1     06-24    TPro  4.8<L>  /  Alb  0.8<L>  /  TBili  1.4<H>  /  DBili  x   /  AST  73<H>  /  ALT  27  /  AlkPhos  298<H>  06-24     LIVER FUNCTIONS - ( 2020 07:05 )  Alb: 0.8 g/dL / Pro: 4.8 gm/dL / ALK PHOS: 298 U/L / ALT: 27 U/L / AST: 73 U/L / GGT: x           Urinalysis Basic - ( 2020 05:41 )    Color: Ale / Appearance: Slightly Turbid / S.010 / pH: x  Gluc: x / Ketone: Negative  / Bili: Negative / Urobili: 1 mg/dL   Blood: x / Protein: 30 mg/dL / Nitrite: Negative   Leuk Esterase: Moderate / RBC: 11-25 /HPF / WBC 3-5   Sq Epi: x / Non Sq Epi: Negative / Bacteria: Many      Culture - Sputum (collected 2020 08:29)  Source: .Sputum Sputum trap  Gram Stain (2020 14:45):    No Squamous epithelial cells per low power field    Moderate polymorphonuclear leukocytes per low power field    Moderate Yeast per oil power field  Preliminary Report (2020 10:05):    Normal Respiratory Elsa present    Culture - Urine (collected 2020 05:41)  Source: .Urine None  Final Report (2020 10:38):    >100,000 CFU/ml Methicillin resistant Staphylococcus aureus    <10,000 CFU/ml Normal Urogenital elsa present  Organism: Methicillin resistant Staphylococcus aureus (2020 10:38)  Organism: Methicillin resistant Staphylococcus aureus (2020 10:38)      -  Ampicillin/Sulbactam: R <=8/4      -  Cefazolin: R 8      -  Daptomycin: S 1      -  Gentamicin: S <=1 Should not be used as monotherapy      -  Linezolid: S 2      -  Oxacillin: R >2      -  Penicillin: R >8      -  RIF- Rifampin: S <=1 Should not be used as monotherapy      -  Tetra/Doxy: S <=1      -  Trimethoprim/Sulfamethoxazole: S <=0.5/9.5      -  Vancomycin: S 2      Method Type: KRYSTEN    Culture - Blood (collected 2020 22:22)  Source: .Blood None  Gram Stain (2020 15:07):    Growth in aerobic bottle: Gram Positive Cocci in Clusters    Growth in anaerobic bottle: Gram Positive Cocci in Clusters  Final Report (2020 10:45):    Growth in aerobic and anaerobic bottles: Methicillin resistant    Staphylococcus aureus  Organism: Blood Culture PCR  Methicillin resistant Staphylococcus aureus (2020 10:45)  Organism: Methicillin resistant Staphylococcus aureus (2020 10:45)      -  Ampicillin/Sulbactam: R <=8/4      -  Cefazolin: R 8      -  Clindamycin: R >4      -  Daptomycin: S 1      -  Erythromycin: R >4      -  Gentamicin: S <=1 Should not be used as monotherapy      -  Linezolid: S 2      -  Oxacillin: R >2      -  Penicillin: R >8      -  RIF- Rifampin: S <=1 Should not be used as monotherapy      -  Tetra/Doxy: S <=1      -  Trimethoprim/Sulfamethoxazole: S <=0.5/9.5      -  Vancomycin: S 2      Method Type: KRYSTEN  Organism: Blood Culture PCR (2020 10:45)      -  Methicillin resistant Staphylococcus aureus (MRSA): Detec      Method Type: PCR    Culture - Blood (collected 2020 22:22)  Source: .Blood None  Gram Stain (2020 17:42):    Growth in aerobic bottle: Gram Positive Cocci in Clusters    Growth in anaerobic bottle: Gram Positive Cocci in Clusters  Final Report (2020 10:50):    Growth in aerobic and anaerobic bottles: Methicillin resistant    Staphylococcus aureus    See previous culture [id number]    Culture - Blood in AM (20 @ 14:30)    Gram Stain:   Growth in aerobic bottle: Yeast like cells    -  Methicillin resistant Staphylococcus aureus (MRSA): Nondet    -  Coagulase negative Staphylococcus: Nondet    -  Enterococcus species: Nondet    -  Vancomycin resistant Enterococcus sp.: Nondet    -  Escherichia coli: Nondet    -  Klebsiella oxytoca: Nondet    -  Klebsiella pneumoniae: Nondet    -  Serratia marcescens: Nondet    -  Haemophilus influenzae: Nondet    -  Listeria monocytogenes: Nondet    -  Neisseria meningitidis: Nondet    -  Pseudomonas aeruginosa: Nondet    -  Acinetobacter baumanii: Nondet    -  Enterobacter cloacae complex: Nondet    -  Streptococcus sp. (Not Grp A, B or S pneumoniae): Nondet    -  Streptococcus agalactiae (Group B): Nondet    -  Streptococcus pyogenes (Group A): Nondet    -  Streptococcus pneumoniae: Nondet    -  Candida albicans: Nondet    -  Candida glabrata: Nondet    -  Candida krusei: Nondet    -  Candida parapsilosis: Nondet    -  Candida tropicalis: Nondet    -  Interpretation: See note This test method is not approved by the FDA and is for research use only.  The performance characteristics of this assay may have been determined by [facility] and [facility], [place], [place]                            N/I - No interpretation available                                                         SDD – Sensitive Dose Dependent    -  5-Flucytosine: N/I For Candida species, no interpretation available for any invitro susceptibility testing.    -  Amphotericin B: N/I 0.25    -  Andulafungin: N/I 0.12    -  Caspofungin: N/I 0.06 CASPOFUNGIN: is indicated in the treatment of candidemia, intra-abdominal abscess, peritonitis, pleural space infections and esophageal candidiasis.    -  Fluconazole: N/I <=0.12 Fluconazole = Data established for mucosal disease, and is generally applicable for invasive disease.  Susceptibility is dependent on achieving the maximal possible blood level.  Doses of 400 MG/day or more may be required in adults with normal renalfunction and body habitus.    -  Itraconazole: N/I For Candida species, no interpretation available for any invitro susceptibility testing.    -  Micafungin: N/I 0.015    -  Posaconazole: N/I 0.015    -  Voriconazole: N/I <=0.008 VORICONAZOLE: The KRYSTEN has been determined by the colormetric microdilution method.    -  Multidrug (KPC pos) resistant organism: Nondet    -  Staphylococcus aureus: Nondet    Specimen Source: .Blood Blood-Peripheral    Organism: Blood Culture PCR    Organism: Brittny dubliniensis    Culture Results:   Growth in aerobic bottle: Brittny dubliniensis    Organism Identification: Blood Culture PCR  Candida dubliniensis    Method Type: PCR    Method Type: YSTMIC        Radiology: all available radiological tests reviewed    Cardiac Echo:  The left ventricle is normal in size, wall thickness, wall motion and   contractility.   Estimated left ventricular ejection fraction is 60 %.   The right atrium is normal in size.   A catheter is noted in the right atrium.   The aortic valve is trileaflet with thin pliable leaflets.   The mitral valve leaflets appear thickened.   Trace mitral regurgitation is present.   The tricuspid valve leaflets appear mildly thickened and/or calcified, but   open well.   Trace tricuspid valve regurgitation is present.   Trace pulmonic valvular regurgitation is present.    < from: US Abdomen Complete (20 @ 17:46) >  Avascular and mildly echogenic soft tissue mass surrounding portal vein, biliary ducts, and RIGHT hepatic artery of indeterminate etiology. FOLLOW-UP CONTRAST CT SCAN RECOMMENDED.,  Status post cholecystectomy.  Mild splenomegaly.  Fatty infiltration ofliver.    < end of copied text >      Advanced directives addressed: full resuscitation

## 2020-07-14 NOTE — PROGRESS NOTE ADULT - SUBJECTIVE AND OBJECTIVE BOX
Date of service: 7/14/2020  Chief Complaint: B/L lower extremity wounds     HPI: Pt was examined at bedside by podiatry with attending present, for B/L lower extremity ulcerative lesions. Pt is a 38 yo female who has been admitted to the CCU for septic shock, UTI with MRSA and bacteremia. Noted Pt is currently intubated and non verbal, she is alert and oriented. The patient is also being treated for anemia, acute respiratory failure and toxic metabolic encephalopathy. Of Note, Pt has a history of IVDA drug use and is critically ill and at risk for acute decompensation possible death per the intensivist. Due to the patient being intubated all additional medical history and information was gathered from the patients chart.    7/14: Patient seen and examined by podiatry for follow-up of right and left lower extremity wounds. Patient noted be arousable at bedside and patient s/p tracheostomy procedure performed on 7/13/2020. Patient noted to be responsive to stimuli when her lower extremities are examined by Podiatry. All further HPI cannot be obtained due to patient's current condition and obtained from patient's chart.     REVIEW OF SYSTEMS:  Unable to assess due to pt's inability to communicate per intubation    Allergies:  morphine (Unknown)    Past Medical History:  Lupus, RA, Reynard's Epilepsy, IVDA     Family History:  No pertinent family history in first degree relatives: cannot recall family history at this time    Social History:  lives alone  single  smokes 4 cig/day  uses two bags of heroin a day (24 Jun 2020 01:21)    MEDICATIONS  (STANDING):  ascorbic acid 500 milliGRAM(s) Oral daily  chlorhexidine 0.12% Liquid 15 milliLiter(s) Oral Mucosa every 12 hours  collagenase Ointment 1 Application(s) Topical daily  Dakins Solution - 1/4 Strength 1 Application(s) Topical daily  dexMEDEtomidine Infusion 0.4 MICROgram(s)/kG/Hr (5.44 mL/Hr) IV Continuous <Continuous>  doxycycline hyclate Capsule 100 milliGRAM(s) Oral every 12 hours  famotidine    Tablet 20 milliGRAM(s) Oral two times a day  fluconAZOLE IVPB 400 milliGRAM(s) IV Intermittent every 24 hours  folic acid 1 milliGRAM(s) Oral daily  heparin   Injectable 5000 Unit(s) SubCutaneous every 8 hours  melatonin 5 milliGRAM(s) Oral at bedtime  multivitamin 1 Tablet(s) Oral daily  silver sulfADIAZINE 1% Cream 1 Application(s) Topical daily  thiamine 100 milliGRAM(s) Oral daily    MEDICATIONS  (PRN):  acetaminophen   Tablet .. 650 milliGRAM(s) Oral every 4 hours PRN Temp greater or equal to 38C (100.4F), Mild Pain (1 - 3)  glucagon  Injectable 1 milliGRAM(s) IntraMuscular once PRN Glucose <70 milliGRAM(s)/deciLiter  hydrOXYzine hydrochloride 50 milliGRAM(s) Oral every 4 hours PRN Anxiety  loperamide 2 milliGRAM(s) Oral every 4 hours PRN Loose stool  LORazepam   Injectable 1 milliGRAM(s) IV Push every 6 hours PRN Agitation  oxyCODONE    IR 5 milliGRAM(s) Oral every 6 hours PRN Moderate Pain (4 - 6)    Vitals:   Vital Signs Last 24 Hrs  T(C): 38.2 (14 Jul 2020 09:00), Max: 38.4 (13 Jul 2020 22:00)  T(F): 100.8 (14 Jul 2020 09:00), Max: 101.1 (13 Jul 2020 22:00)  HR: 127 (14 Jul 2020 09:00) (75 - 135)  BP: 118/86 (14 Jul 2020 08:00) (93/68 - 142/90)  BP(mean): 93 (14 Jul 2020 08:00) (73 - 101)  SpO2: 100% (14 Jul 2020 09:00) (92% - 100%)    Physical examination:  Constitutional: Pt intubated, lying in bed    Focused LE examination:   Vasc: DP and PT pulses non palpable b/l, CFT < 5 x 10, TG: warm to warm  Neuro and MSK: Unable to assess b/c pt is intubated    Derm:  Noted Multiple hyperpigmented scar-like lesions scattered all over LE, bl    Rt LE  - Full thickness ulcer with exposed peroneal tendons on lateral aspect of distal RLE that + probes to Fibula, no drainage, + undermining all around the clock, + tenderness. Negative crepitus or fluctuance to area of lateral aspect of the distal RLE.  - Rt 5th toe demonstrates a full thickness with less fibronecotic wound bed compared to the prior examination, measuring approx. 7.2 x 4.6 x 0.8 cm, - purulence, - malodor, - fluctuance +Probe to 5th metatarsal head, EDL tendon exposed and + undermining from the medial aspect, + tunneling to the 9 hour.     Lt LE:  -Full thickness ulcer round in shape and approx  0.5 cm distal to lateral malleolus, tunneling to 3 and 6 o'clock positions, + undermining all around, - drainage, - fluctuance - malodor, + probe to bone to the lateral malleolus, - purulence; negative crepitus and - fluctuance  - Multiple Full thickness ulcerations with exposed Achilles tendon noted to the posterior surface of the left distal 1/3 aspect of the left leg.  -Swelling and mild edema noted over the medial malleolus of LLE. Edema noted to the dorsal-lateral aspect of the left fifth MPJ, noted to be mostly likely pressure induced; area of black-blue discoloration noted to the the left fifth sub met 5 region.   -Partial thickness ulcer, 4th interdigital space with presence of dry blood, - probe to bone, - undermining - tunneling  - Non gradable eschar on 3rd digit on Lt foot  - Full thickness ulceration noted to the dorsum of the left 1st MPJ. Positive probing to the left 1st MPJ. Positive purulence exuded from the wound with milking of the left foot.      LABS:                         9.3    12.13 )-----------( 409      ( 13 Jul 2020 10:15 )             29.3     07-13  142  |  115<H>  |  36<H>  ----------------------------<  88  4.1   |  20<L>  |  0.90    Ca    7.6<L>      13 Jul 2020 10:15  Phos  4.2     07-13  Mg     1.8     07-13    Blood Culture:   07-06 @ 14:30  Organism Blood Culture PCR  Gram Stain Blood -- Gram Stain   Growth in aerobic bottle: Yeast like cells  Specimen Source .Blood Blood-Peripheral  Culture-Blood --    Culture - Blood in AM (07.06.20 @ 14:30)    -  Methicillin resistant Staphylococcus aureus (MRSA): Nondet    -  Coagulase negative Staphylococcus: Nondet    -  Enterococcus species: Nondet    -  Vancomycin resistant Enterococcus sp.: Nondet    -  Escherichia coli: Nondet    -  Klebsiella oxytoca: Nondet    -  Klebsiella pneumoniae: Nondet    -  Serratia marcescens: Nondet    -  Haemophilus influenzae: Nondet    -  Listeria monocytogenes: Nondet    -  Neisseria meningitidis: Nondet    -  Pseudomonas aeruginosa: Nondet    -  Acinetobacter baumanii: Nondet    -  Enterobacter cloacae complex: Nondet    -  Streptococcus sp. (Not Grp A, B or S pneumoniae): Nondet    -  Streptococcus agalactiae (Group B): Nondet    -  Streptococcus pyogenes (Group A): Nondet    -  Streptococcus pneumoniae: Nondet    -  Candida albicans: Nondet    -  Candida glabrata: Nondet    -  Candida krusei: Nondet    -  Candida parapsilosis: Nondet    -  Candida tropicalis: Nondet    -  Multidrug (KPC pos) resistant organism: Nondet    -  Staphylococcus aureus: Nondet    Gram Stain:   Growth in aerobic bottle: Yeast like cells    Specimen Source: .Blood Blood-Peripheral    Organism: Blood Culture PCR    Culture Results:   Growth in aerobic bottle: Brittny dubliniensis  "Due to technical problems, Proteus sp. will Not be reported as part of  the BCID panel until further notice"  ***Blood Panel PCR results on this specimen are available  approximately 3 hours after the Gram stain result.***  Gram stain, PCR, and/or culture results may not always  correspond due to difference in methodologies.      Imaging:    < from: TTE Echo Complete w/o Contrast w/ Doppler (06.27.20 @ 08:58) >   Impression   Summary   The mid to apical anterolateral to anterior segments are severely   hypokinetic. The apical inferoseptal wall appears hypokinetic.   Estimated left ventricular ejection fraction is 45-50 %.   A catheter wire is seen in the right atrium.   Mild (1+) tricuspid valve regurgitation is present.   Mild pulmonary hypertension.  < end of copied text >      < from: Xray Ankle Complete 3 Views, Bilateral (07.09.20 @ 16:42) >  EXAM:  XR ANKLE COMP MIN 3 VIEWS BI                        *** ADDENDUM 07/10/2020  ***                                                           Addendum:    The posterior soft tissue gas of the left ankle may be related to skin ulcerationor early fistulous tract formation. Clinical correlation is suggested.  *** END OF ADDENDUM 07/10/2020  ***  PROCEDURE DATE:  07/09/2020    INTERPRETATION:  Bilateral ankles  HISTORY: Bilateral ulcers    Three views of the right ankle and three views of the left ankle show no evidence of fracture nor destructive change. The joint spaces are maintained.  Subcutaneous soft tissue gas is present behind the left ankle.  IMPRESSION: Soft tissue gas as noted.  Thank you for this referral  ***Please see the addendum at the top of this report. It may contain additional important information or changes.****  < end of copied text >

## 2020-07-14 NOTE — PROGRESS NOTE ADULT - SUBJECTIVE AND OBJECTIVE BOX
Patient is a 39y old  Female who presents with a chief complaint of Cellulitis with sepsis, symptomatic anemia. (14 Jul 2020 10:46)      BRIEF HOSPITAL COURSE: 38 y/o female with lupus, RA, epilepsy (last seizure 8 yrs ago), IVDA was clean and recently began snorting 2 bags of heroin daily, who presented to ED due to weakness, failure to thrive. Found to have acute anemia, NATALIA, transaminitis, metabolic lactic acidosis, severe dehydration, severe protein calorie malnutrition. Patient was found to have severe sepsis with septic shock secondary to MRSA bacteremia and UTI treated with a two-week course of Vancomycin, developed worsening respiratory failure and was ultimately intubated on 6/27. Also found to be fungemic on Diflucan.        Events last 24 hours: s/p tracheostomy yesterday 7/13. Worked with PT today          PAST MEDICAL & SURGICAL HISTORY:  Smoker  Heroin abuse  H/O Raynaud's syndrome  Rheumatoid arthritis  Lupus  Gall bladder stones  H/O appendicitis  H/O tubal ligation          SOCIAL HISTORY: IVDA        Review of Systems: Admits to anxiety. Due to tracheostomy, subjective information was not able to be obtained from the patient. History was obtained, to the extent possible, from review of the chart and collateral sources of information.        Medications:  doxycycline hyclate Capsule 100 milliGRAM(s) Oral every 12 hours  fluconAZOLE IVPB 400 milliGRAM(s) IV Intermittent every 24 hours  acetaminophen   Tablet .. 650 milliGRAM(s) Oral every 4 hours PRN  dexMEDEtomidine Infusion 0.4 MICROgram(s)/kG/Hr IV Continuous <Continuous>  hydrOXYzine hydrochloride 50 milliGRAM(s) Oral every 4 hours PRN  LORazepam   Injectable 1 milliGRAM(s) IV Push every 6 hours PRN  melatonin 5 milliGRAM(s) Oral at bedtime  oxyCODONE    IR 5 milliGRAM(s) Oral every 6 hours PRN  heparin   Injectable 5000 Unit(s) SubCutaneous every 8 hours  famotidine    Tablet 20 milliGRAM(s) Oral two times a day  loperamide 2 milliGRAM(s) Oral every 4 hours PRN  glucagon  Injectable 1 milliGRAM(s) IntraMuscular once PRN  ascorbic acid 500 milliGRAM(s) Oral daily  folic acid 1 milliGRAM(s) Oral daily  multivitamin 1 Tablet(s) Oral daily  thiamine 100 milliGRAM(s) Oral daily  chlorhexidine 0.12% Liquid 15 milliLiter(s) Oral Mucosa every 12 hours  collagenase Ointment 1 Application(s) Topical daily  Dakins Solution - 1/4 Strength 1 Application(s) Topical daily  silver sulfADIAZINE 1% Cream 1 Application(s) Topical daily        Mode: AC/ CMV (Assist Control/ Continuous Mandatory Ventilation)  RR (machine): 15  TV (machine): 400  FiO2: 30  PEEP: 5  ITime: 1  PIP: 28      ICU Vital Signs Last 24 Hrs  T(C): 38.2 (14 Jul 2020 09:00), Max: 38.4 (13 Jul 2020 22:00)  T(F): 100.8 (14 Jul 2020 09:00), Max: 101.1 (13 Jul 2020 22:00)  HR: 119 (14 Jul 2020 11:23) (89 - 135)  BP: 108/71 (14 Jul 2020 11:00) (93/68 - 142/90)  BP(mean): 78 (14 Jul 2020 11:00) (73 - 101)  ABP: --  ABP(mean): --  RR: --  SpO2: 96% (14 Jul 2020 11:23) (92% - 100%)          I&O's Detail    13 Jul 2020 07:01  -  14 Jul 2020 07:00  --------------------------------------------------------  IN:    dexmedetomidine Infusion: 383 mL    Enteral Tube Flush: 426 mL    Osmolite: 579 mL  Total IN: 1388 mL    OUT:  Total OUT: 0 mL    Total NET: 1388 mL            LABS:                        9.3    12.13 )-----------( 409      ( 13 Jul 2020 10:15 )             29.3     07-13    142  |  115<H>  |  36<H>  ----------------------------<  88  4.1   |  20<L>  |  0.90    Ca    7.6<L>      13 Jul 2020 10:15  Phos  4.2     07-13  Mg     1.8     07-13            CAPILLARY BLOOD GLUCOSE            CULTURES:              Physical Examination:    General: Frail appearing    HEENT: Pupils equal, reactive to light.  Symmetric. NGT in place.    PULM: s/p tracheostomy. Clear to auscultation bilaterally    CVS: Regular rate and rhythm, no murmurs, rubs, or gallops    ABD: Soft, nondistended, nontender, normoactive bowel sounds, no masses    EXT: No edema, nontender    SKIN: Diffuse track marks over all four extremities with areas of skin breakdown and scar tissue, b/l foot wounds wrapped in bandages.    NEURO: Alert, interactive RASS 0        RADIOLOGY: prior images reviewed         INVASIVE LINES: X   INDWELLING MARI: X   VTE PROPHYLAXIS: Heparin SC   CAM ICU: -  CODE STATUS: FULL        CRITICAL CARE TIME SPENT: 34 minutes spent performing frequent bedside reassessments and augmenting plan of care to address problems of acute critical illness that pose high probability of life threatening deterioration and/or end organ damage/dysfunction and discussing goals of care, non-inclusive of time spent on procedures performed.

## 2020-07-14 NOTE — CONSULT NOTE ADULT - SUBJECTIVE AND OBJECTIVE BOX
History of Present Illness:  39y Female h/o lupus, RA, epilepsy (last seizure 8 yrs ago), chronic pain, IVDA who presented to ED due to weakness, failure to thrive. Found to have acute anemia, NATALIA, transaminitis, metabolic lactic acidosis, severe dehydration, severe protein calorie malnutrition. Patient was found to have severe sepsis with septic shock secondary to MRSA bacteremia and UTI treated with a two-week course of Vancomycin, developed worsening respiratory failure and was ultimately intubated on 6/27/20.  Underwent tracheostomy yesterday for chronic respiratory failure, currently being treated for fungemia.      PMH/PSH:  Smoker  Heroin abuse  H/O Raynaud's syndrome  Rheumatoid arthritis  Lupus    Gall bladder stones  H/O appendicitis  H/O tubal ligation      Relevant Family History  FAMILY HISTORY:  No pertinent family history in first degree relatives: cannot recall family history at this time      SOCIAL HISTORY:  Smoker: [x ] Yes  [ ] No        PACK YEARS:                         WHEN QUIT?  ETOH use: unknown  Ilicit Drug use:  [x ] Yes  [ ] No      MEDICATIONS  (STANDING):  ascorbic acid 500 milliGRAM(s) Oral daily  chlorhexidine 0.12% Liquid 15 milliLiter(s) Oral Mucosa every 12 hours  cloNIDine 0.1 milliGRAM(s) Oral every 8 hours  collagenase Ointment 1 Application(s) Topical daily  Dakins Solution - 1/4 Strength 1 Application(s) Topical daily  dexMEDEtomidine Infusion 0.4 MICROgram(s)/kG/Hr (5.44 mL/Hr) IV Continuous <Continuous>  doxycycline hyclate Capsule 100 milliGRAM(s) Oral every 12 hours  enoxaparin Injectable 40 milliGRAM(s) SubCutaneous daily  famotidine    Tablet 20 milliGRAM(s) Oral two times a day  fluconAZOLE IVPB 400 milliGRAM(s) IV Intermittent every 24 hours  folic acid 1 milliGRAM(s) Oral daily  melatonin 5 milliGRAM(s) Oral at bedtime  multivitamin 1 Tablet(s) Oral daily  silver sulfADIAZINE 1% Cream 1 Application(s) Topical daily  thiamine 100 milliGRAM(s) Oral daily    MEDICATIONS  (PRN):  acetaminophen   Tablet .. 650 milliGRAM(s) Oral every 4 hours PRN Temp greater or equal to 38C (100.4F), Mild Pain (1 - 3)  ALPRAZolam 0.5 milliGRAM(s) Oral every 6 hours PRN Anxiety  glucagon  Injectable 1 milliGRAM(s) IntraMuscular once PRN Glucose <70 milliGRAM(s)/deciLiter  hydrOXYzine hydrochloride 50 milliGRAM(s) Oral every 4 hours PRN Anxiety  loperamide 2 milliGRAM(s) Oral every 4 hours PRN Loose stool  oxyCODONE    IR 5 milliGRAM(s) Oral every 6 hours PRN Moderate Pain (4 - 6)      Allergies: morphine (Unknown)                                                            LABS:                        9.3    12.13 )-----------( 409      ( 13 Jul 2020 10:15 )             29.3     07-13    142  |  115<H>  |  36<H>  ----------------------------<  88  4.1   |  20<L>  |  0.90    Ca    7.6<L>      13 Jul 2020 10:15  Phos  4.2     07-13  Mg     1.8     07-13                    Review of Systems       Could not obtain - tracheostomy in place.    T(C): 37.8 (07-14-20 @ 12:00), Max: 38.4 (07-13-20 @ 22:00)  HR: 122 (07-14-20 @ 12:00) (89 - 135)  BP: 110/77 (07-14-20 @ 12:00) (93/68 - 142/90)  SpO2: 100% (07-14-20 @ 12:00) (92% - 100%)      Physical Exam  General: WD, NAD                                                         Neuro: A+O x 3, on precedex, but answers appropriately               Eyes: PERRL, EOMI   ENT: Normal exam of nasal/oral mucosa with absence of cyanosis.   Neck: supple, no JVD, trachea midline, with tracheostomy  Chest: CTA, equal bilaterally, no wheezes/rales/rhonchi, normal excursion, no accessory muscle use note  CV: tachycardic +S1S2  GI: soft, NT, ND, +BS, no organomegaly noted  Extremities: clubbing of upper extremities, generalized edema  SKIN: warm, with chronic ulcers of upper and lower extremities

## 2020-07-14 NOTE — PROGRESS NOTE ADULT - ASSESSMENT
38 y/o female with lupus, RA, epilepsy (last seizure 8 yrs ago), IVDA was clean and recently began snorting 2 bags of heroin daily, who presented to ED due to weakness, failure to thrive. Found to have acute anemia, NATALIA, transaminitis, metabolic lactic acidosis, severe dehydration, severe protein calorie malnutrition. Patient was found to have severe sepsis with septic shock secondary to MRSA bacteremia and UTI, developed worsening respiratory failure and was ultimately intubated on 6/27. Also found to be fungemic.    Neuro: IVDA heroin addiction, weaning off opiates, treating opiate withdrawal symptomatically. Continues to require Precedex infusion, will start Clonidine and begin bridging off Precedex. Start Xanax PRN anxiety. Severe debility and joint contractures related to RA, needs daily PT engagement    Pulm: Continues to require full vent support, s/p tracheostomy yesterday. Will begin daily SBT trials today    CV: Shock resolved    GI: Tube feeds at goal via NGT. Folate, thiamine, and MVI supplementation. Ascorbic acid to aid in wound healing. Thoracic consulted for PEG placement.    Renal: NATALIA resolving, still with prerenal azotemia. Monitoring renal indices, I&Os.    ID: Still febrile intermittently with Tmax 101.1'F, no lines no reynoso. MRSA bacteremia resolved, blood cultures cleared, completed 14 day course of Vancomycin. On Diflucan for fungemia. GRAHAM with no evidence of AIE. Doxycycline for infected foot wounds, suspect MRSA as culprit. Podiatry following, cultures sent from foot wound- pending results. Wound care per pods/ wound care recommendations. Vascular consult requested by Podiatry to assess distal perfusion, pending b/l LE arterial dopplers.    Heme: Renal indices normalized, change to Lovenox for DVT ppx

## 2020-07-14 NOTE — PROGRESS NOTE ADULT - SUBJECTIVE AND OBJECTIVE BOX
40yo F with Hx of Lupus, RA, RAynauds, Epilepsy, IVDA admitted for cellulitis/sepsis/NATALIA/anemia of  and found to have MRSA bacteremia. Hospital course includes decompensation on the floor requiring intubation. Patient is now POD#1 s/p tracheostomy insertion. Patient is non-verbal but will nod head in response to questioning and exhibits understanding    PAST MEDICAL & SURGICAL HISTORY:  Smoker  Heroin abuse  H/O Raynaud's syndrome  Rheumatoid arthritis  Lupus  Gall bladder stones  H/O appendicitis  H/O tubal ligation        Vitals:  T(C): 37.8 ( @ 12:00), Max: 38.4 ( @ 22:00)  HR: 113 ( @ :00) (107 - 135)  BP: 108/67 ( @ 13:00) (93/68 - 142/90)  RR: --  SpO2: 100% ( @ 13:00) (92% - 100%)     @ 07:01  -   @ 07:00  --------------------------------------------------------  IN:    dexmedetomidine Infusion: 383 mL    Enteral Tube Flush: 426 mL    Osmolite: 579 mL  Total IN: 1388 mL    OUT:  Total OUT: 0 mL    Total NET: 1388 mL        Physical Examination:  Gen: NAD  Cardio: S1, S2 in tact, RRR  Resp: Equal air entry b/L  Abdomen: Soft NTND  MSK: FROM (passive); Palpable Right DP 2+, Dopplerable Right PT and Left DP/PT  Neuro/Psych: Normal Speech, Normal Tone     @ 10:15                    9.3  CBC: 12.13>)-------(<409                     29.3                 142 | 115 | 36    CMP:  ----------------------< 88               4.1 | 20 | 0.90                      Ca:7.6  Phos:4.2  M.8          Current Inpatient Medications:  acetaminophen   Tablet .. 650 milliGRAM(s) Oral every 4 hours PRN  ALPRAZolam 0.5 milliGRAM(s) Oral every 6 hours PRN  ascorbic acid 500 milliGRAM(s) Oral daily  chlorhexidine 0.12% Liquid 15 milliLiter(s) Oral Mucosa every 12 hours  cloNIDine 0.1 milliGRAM(s) Oral every 8 hours  collagenase Ointment 1 Application(s) Topical daily  Dakins Solution - 1/4 Strength 1 Application(s) Topical daily  dexMEDEtomidine Infusion 0.4 MICROgram(s)/kG/Hr (5.44 mL/Hr) IV Continuous <Continuous>  doxycycline hyclate Capsule 100 milliGRAM(s) Oral every 12 hours  enoxaparin Injectable 40 milliGRAM(s) SubCutaneous daily  famotidine    Tablet 20 milliGRAM(s) Oral two times a day  fluconAZOLE IVPB 400 milliGRAM(s) IV Intermittent every 24 hours  folic acid 1 milliGRAM(s) Oral daily  glucagon  Injectable 1 milliGRAM(s) IntraMuscular once PRN  hydrOXYzine hydrochloride 50 milliGRAM(s) Oral every 4 hours PRN  loperamide 2 milliGRAM(s) Oral every 4 hours PRN  melatonin 5 milliGRAM(s) Oral at bedtime  multivitamin 1 Tablet(s) Oral daily  oxyCODONE    IR 5 milliGRAM(s) Oral every 6 hours PRN  silver sulfADIAZINE 1% Cream 1 Application(s) Topical daily  thiamine 100 milliGRAM(s) Oral daily

## 2020-07-14 NOTE — CONSULT NOTE ADULT - ASSESSMENT
39y Female h/o lupus, RA, epilepsy (last seizure 8 yrs ago), chronic pain, IVDA who presented to ED due to weakness, failure to thrive. Found to have acute anemia, NATALIA, transaminitis, metabolic lactic acidosis, severe dehydration, severe protein calorie malnutrition. Patient was found to have severe sepsis with septic shock secondary to MRSA bacteremia and UTI treated with a two-week course of Vancomycin, developed worsening respiratory failure and was ultimately intubated on 6/27/20.  Underwent tracheostomy yesterday for chronic respiratory failure, currently being treated for fungemia.    Case discussed with Dr. Justice.    Will tentatively plan for PEG in endoscopy on Thursday.  Will need preop labs (will order for tomorrow).  NPO p MN Wednesday.    Marline bedside RN made aware.

## 2020-07-14 NOTE — PROGRESS NOTE ADULT - ASSESSMENT
Assessment: 40 y/o female seen by podiatry at bedside in the CCU for the followin.) Full Thickness Fibronecrotic Ulcer of Right fifth digit  2.) Full Thickness Fibronecrotic Ulcer with exposed peroneal tendon, lateral aspect RLE  3.) Full thickness ulcer with exposed Achilles tendon LT  4.) Full thickness ulcer to lateral malleolus, LLE  5) Full thickness ulceration, dorsum of left 1st MTPJ  6) Partial Thickness ulcer 4th interspace, Lt foot  7) Non gradable Eschar of 3rd digit, Lt foot  8) Multiple hyperpigmented scar-like lesions scattered all over LE, bl  9) Pain in B/L Lower Extremities     Plan:  - Chart reviewed and patient evaluated by Podiatry team.  - X-rays of the b/l LEs reviewed at this time. Case discussed with radiologist in depth. Please note oval radiolucencies visualized in the b/l LEs X-rays are due to air in exposed open wounds.   - All wounds to b/l LEs irrigated with copious amounts of sterile saline.  - Dakins soaked gauzed applied to full-thickness ulceration to right fifth digit.   - Adaptic applied over exposed peroneal tendons to RLE.   - Adaptic applied over exposed Achilles tendon of LLE.   - Silvadene applied to wounds of L 3rd digit and L lateral malleolus ulceration.  - Wound cx taken of left 1st MTPJ full-thickness ulceration, will f/u on results once available.  - The b/l feet and ankles were wrapped with 4x4 gauze, ABD pad and kerlix, then secured with tape.   - Patient's right and left heels to be offloaded in b/l Z-flex boots to be worn at all times when patient bedbound.  - Vascular Consult placed, will f/u on input, appreciated.   - Discussed case with ID department given purulence exuded from full thickness ulceration to left 1st MPJ and that deep wound cx was taken, Abx per ID appreciated.  - Discussed with CCU doctor the case, and recommendations, appreciated   - No podiatric surgical intervention necessary at this time as no abscess collections found to b/l lower extremities   - At this time, podiatry will provide supportive, and noninvasive wound care to the wounds, b/l lower extremities, given the fact that the patient is deemed in non stable medically and at risk for acute decompensation.  - Podiatry will follow the case closely while in house.

## 2020-07-15 ENCOUNTER — TRANSCRIPTION ENCOUNTER (OUTPATIENT)
Age: 40
End: 2020-07-15

## 2020-07-15 LAB
-  AMPICILLIN/SULBACTAM: SIGNIFICANT CHANGE UP
-  CEFAZOLIN: SIGNIFICANT CHANGE UP
-  CLINDAMYCIN: SIGNIFICANT CHANGE UP
-  DAPTOMYCIN: SIGNIFICANT CHANGE UP
-  ERYTHROMYCIN: SIGNIFICANT CHANGE UP
-  GENTAMICIN: SIGNIFICANT CHANGE UP
-  LINEZOLID: SIGNIFICANT CHANGE UP
-  OXACILLIN: SIGNIFICANT CHANGE UP
-  PENICILLIN: SIGNIFICANT CHANGE UP
-  RIFAMPIN: SIGNIFICANT CHANGE UP
-  TETRACYCLINE: SIGNIFICANT CHANGE UP
-  TRIMETHOPRIM/SULFAMETHOXAZOLE: SIGNIFICANT CHANGE UP
-  VANCOMYCIN: SIGNIFICANT CHANGE UP
CULTURE RESULTS: SIGNIFICANT CHANGE UP
METHOD TYPE: SIGNIFICANT CHANGE UP
ORGANISM # SPEC MICROSCOPIC CNT: SIGNIFICANT CHANGE UP
ORGANISM # SPEC MICROSCOPIC CNT: SIGNIFICANT CHANGE UP
SPECIMEN SOURCE: SIGNIFICANT CHANGE UP

## 2020-07-15 PROCEDURE — 99233 SBSQ HOSP IP/OBS HIGH 50: CPT | Mod: 57

## 2020-07-15 PROCEDURE — 99232 SBSQ HOSP IP/OBS MODERATE 35: CPT

## 2020-07-15 PROCEDURE — 71045 X-RAY EXAM CHEST 1 VIEW: CPT | Mod: 26

## 2020-07-15 RX ORDER — FUROSEMIDE 40 MG
20 TABLET ORAL ONCE
Refills: 0 | Status: COMPLETED | OUTPATIENT
Start: 2020-07-15 | End: 2020-07-15

## 2020-07-15 RX ORDER — HYDROMORPHONE HYDROCHLORIDE 2 MG/ML
0.5 INJECTION INTRAMUSCULAR; INTRAVENOUS; SUBCUTANEOUS ONCE
Refills: 0 | Status: DISCONTINUED | OUTPATIENT
Start: 2020-07-15 | End: 2020-07-15

## 2020-07-15 RX ADMIN — Medication 650 MILLIGRAM(S): at 10:46

## 2020-07-15 RX ADMIN — Medication 0.1 MILLIGRAM(S): at 13:07

## 2020-07-15 RX ADMIN — Medication 1 TABLET(S): at 10:04

## 2020-07-15 RX ADMIN — CHLORHEXIDINE GLUCONATE 15 MILLILITER(S): 213 SOLUTION TOPICAL at 21:27

## 2020-07-15 RX ADMIN — Medication 500 MILLIGRAM(S): at 10:05

## 2020-07-15 RX ADMIN — Medication 1 APPLICATION(S): at 11:35

## 2020-07-15 RX ADMIN — Medication 650 MILLIGRAM(S): at 19:45

## 2020-07-15 RX ADMIN — CHLORHEXIDINE GLUCONATE 15 MILLILITER(S): 213 SOLUTION TOPICAL at 10:06

## 2020-07-15 RX ADMIN — Medication 20 MILLIGRAM(S): at 19:15

## 2020-07-15 RX ADMIN — ENOXAPARIN SODIUM 40 MILLIGRAM(S): 100 INJECTION SUBCUTANEOUS at 10:06

## 2020-07-15 RX ADMIN — Medication 650 MILLIGRAM(S): at 11:00

## 2020-07-15 RX ADMIN — Medication 650 MILLIGRAM(S): at 19:15

## 2020-07-15 RX ADMIN — Medication 0.1 MILLIGRAM(S): at 05:04

## 2020-07-15 RX ADMIN — Medication 1 MILLIGRAM(S): at 10:05

## 2020-07-15 RX ADMIN — Medication 50 MILLIGRAM(S): at 05:45

## 2020-07-15 RX ADMIN — Medication 50 MILLIGRAM(S): at 13:07

## 2020-07-15 RX ADMIN — Medication 0.5 MILLIGRAM(S): at 19:15

## 2020-07-15 RX ADMIN — Medication 100 MILLIGRAM(S): at 10:05

## 2020-07-15 RX ADMIN — Medication 1 APPLICATION(S): at 11:34

## 2020-07-15 NOTE — PROGRESS NOTE ADULT - SUBJECTIVE AND OBJECTIVE BOX
Patient is a 39y old  Female who presents with a chief complaint of Cellulitis with sepsis, symptomatic anemia. (15 Jul 2020 11:43)      BRIEF HOSPITAL COURSE: ***    Events last 24 hours: ***    PAST MEDICAL & SURGICAL HISTORY:  Smoker  Heroin abuse  H/O Raynaud's syndrome  Rheumatoid arthritis  Lupus  Gall bladder stones  H/O appendicitis  H/O tubal ligation    Allergies    morphine (Unknown)    Intolerances      FAMILY HISTORY:  No pertinent family history in first degree relatives: cannot recall family history at this time      Social History:     Review of Systems:  CONSTITUTIONAL: No fever, chills, or fatigue  EYES: No eye pain, visual disturbances, or discharge  ENMT:  No difficulty hearing, tinnitus, vertigo; No sinus or throat pain  NECK: No pain or stiffness  RESPIRATORY: No cough, wheezing, chills or hemoptysis; No shortness of breath  CARDIOVASCULAR: No chest pain, palpitations, dizziness, or leg swelling  GASTROINTESTINAL: No abdominal or epigastric pain. No nausea, vomiting, or hematemesis; No diarrhea or constipation. No melena or hematochezia.  GENITOURINARY: No dysuria, frequency, hematuria, or incontinence  NEUROLOGICAL: No headaches, memory loss, loss of strength, numbness, or tremors  SKIN: No itching, burning, rashes, or lesions   MUSCULOSKELETAL: No joint pain or swelling; No muscle, back, or extremity pain  PSYCHIATRIC: No depression, anxiety, mood swings, or difficulty sleeping      Vitals During Exam:   HR:   BP:   RR:  sPO2:     Physical Examination:    General: No acute distress.      HEENT: Pupils equal, reactive to light.  Symmetric.    PULM: Clear to auscultation bilaterally, no significant sputum production    CVS: Regular rate and rhythm, no murmurs, rubs, or gallops    ABD: Soft, nondistended, nontender, normoactive bowel sounds, no masses    EXT: No edema, nontender    SKIN: Warm and well perfused, no rashes noted.    NEURO: Alert, oriented, interactive, nonfocal      Medications:  doxycycline hyclate Capsule 100 milliGRAM(s) Oral every 12 hours  fluconAZOLE IVPB 400 milliGRAM(s) IV Intermittent every 24 hours    cloNIDine 0.1 milliGRAM(s) Oral every 8 hours      acetaminophen   Tablet .. 650 milliGRAM(s) Oral every 4 hours PRN  ALPRAZolam 0.5 milliGRAM(s) Oral every 6 hours PRN  dexMEDEtomidine Infusion 0.4 MICROgram(s)/kG/Hr IV Continuous <Continuous>  hydrOXYzine hydrochloride 50 milliGRAM(s) Oral every 4 hours PRN  melatonin 5 milliGRAM(s) Oral at bedtime  oxyCODONE    IR 5 milliGRAM(s) Oral every 6 hours PRN      enoxaparin Injectable 40 milliGRAM(s) SubCutaneous daily    famotidine    Tablet 20 milliGRAM(s) Oral two times a day  loperamide 2 milliGRAM(s) Oral every 4 hours PRN      glucagon  Injectable 1 milliGRAM(s) IntraMuscular once PRN    ascorbic acid 500 milliGRAM(s) Oral daily  folic acid 1 milliGRAM(s) Oral daily  multivitamin 1 Tablet(s) Oral daily  thiamine 100 milliGRAM(s) Oral daily      chlorhexidine 0.12% Liquid 15 milliLiter(s) Oral Mucosa every 12 hours  collagenase Ointment 1 Application(s) Topical daily  Dakins Solution - 1/4 Strength 1 Application(s) Topical daily  silver sulfADIAZINE 1% Cream 1 Application(s) Topical daily        Mode: CPAP with PS  RR (machine): 0  FiO2: 30  PEEP: 5  PS: 15  PIP: 29      ICU Vital Signs Last 24 Hrs  T(C): 37.9 (15 Jul 2020 10:00), Max: 38.4 (2020 21:21)  T(F): 100.2 (15 Jul 2020 10:00), Max: 101.1 (2020 21:21)  HR: 91 (15 Jul 2020 13:24) (85 - 133)  BP: 105/71 (15 Jul 2020 13:00) (100/58 - 126/80)  BP(mean): 77 (15 Jul 2020 13:00) (67 - 93)  ABP: --  ABP(mean): --  RR: --  SpO2: 100% (15 Jul 2020 13:24) (94% - 100%)    Vital Signs Last 24 Hrs  T(C): 37.9 (15 Jul 2020 10:00), Max: 38.4 (2020 21:21)  T(F): 100.2 (15 Jul 2020 10:00), Max: 101.1 (2020 21:21)  HR: 91 (15 Jul 2020 13:24) (85 - 133)  BP: 105/71 (15 Jul 2020 13:00) (100/58 - 126/80)  BP(mean): 77 (15 Jul 2020 13:00) (67 - 93)  RR: --  SpO2: 100% (15 Jul 2020 13:24) (94% - 100%)        I&O's Detail    2020 07:01  -  15 Jul 2020 07:00  --------------------------------------------------------  IN:    dexmedetomidine Infusion: 238 mL    Enteral Tube Flush: 558 mL    Free Water: 160 mL    Osmolite: 680 mL    Solution: 200 mL  Total IN: 1836 mL    OUT:    Incontinent per Collection Ba mL    Voided: 350 mL  Total OUT: 700 mL    Total NET: 1136 mL      CULTURES:  Culture Results:   Rare Staphylococcus aureus ( @ 09:30)      RADIOLOGY:   < from: US Duplex Arterial Lower Ext Compl, Bilateral (20 @ 14:49) >  EXAM:  US DPLX LWR EXT ARTS COMPL BI                            PROCEDURE DATE:  2020          INTERPRETATION:  US LOWER EXTREMITY ARTERIAL DUPLEX COMPLETE BILATERAL    HISTORY:  Bilateral Lower Extremity Ulcers    Real-time sonography of thebilateral lower extremity arterial system was performed using a high-resolution linear array transducer and including color and spectral Doppler.     Monophasic waveforms are seen throughout the imaged lower extremity arteries. No abnormal step up ofpeak systolic velocities are seen in either lower extremity to suggest a hemodynamically significant stenosis.    Subcutaneous edema is seen throughout both lower extremities. Fluid collections are noted in both groins measuring up to 4.3 cm on the right.    Flow phase patterns and peak systolic velocity measurements (in cm/s) were observed as follows:    RIGHT:  CFA:  Monophasic; 198   Proximal SFA: Monophasic; 102   Mid SFA:Monophasic; 140   Distal SFA:  Monophasic;  104   Popliteal:  Monophasic;  79   Anterior tibial :  Monophasic; 42   Posterior tibial: Monophasic; 56   Peroneal:  Monophasic;  40   Dorsalis pedis: Monophasic;  42     LEFT:  CFA:  Monophasic; 214   Proximal SFA: Monophasic; 131   Mid SFA:Monophasic; 104   Distal SFA:  Monophasic; 104   Popliteal:  Monophasic;  102   Anterior tibial :  Monophasic; 40   Posterior tibial: Monophasic; 68   Peroneal:  Monophasic; 47   Dorsalis pedis:  Monophasic;  46     IMPRESSION:    No evidence of a hemodynamically significant stenosis or occlusion in the visualized lower extremity arteries.    Subcutaneous edema throughout both lower extremities with fluid collections in both groins.    Monophasic waveforms throughout both lower extremities.                ANTHONY CANTU M.D., ATTENDING RADIOLOGIST  This document has been electronically signed. 2020  3:38PM    < end of copied text >        SUPPLEMENTAL O2:   LINES:  IVF:  MARI:   PPx: PPI  CONTACT: Y, MRSA Patient is a 39y old  Female who presents with a chief complaint of Cellulitis with sepsis, symptomatic anemia. (15 Jul 2020 11:43)      BRIEF HOSPITAL COURSE:   40 yo f pmhx IVDA, lupus, RA, epilepsy (last seizure 8 years ago), recent use of snorting heroin (2 bags daily) presented with FTF admitted with anemia, NATALIA, transaminitis, metabolic lactic acidosis, severe dehydration/malnutrition, respiratory failure requiring intubation now s/p trach (), septic shock 2/2 MRSA bacteremia and UTI now fungemic.     Events last 24 hours:  Tentative plan for Peg in am.       PAST MEDICAL & SURGICAL HISTORY:  Smoker  Heroin abuse  H/O Raynaud's syndrome  Rheumatoid arthritis  Lupus  Gall bladder stones  H/O appendicitis  H/O tubal ligation      Allergies  morphine (Unknown)      FAMILY HISTORY:  No pertinent family history in first degree relatives: cannot recall family history at this time      Social History:   IVDA       Review of Systems:  Endorses abdominal discomfort       Physical Examination:    General:  Female lying in bed, appears ill    HEENT: NC/AT, Pupils equal, reactive to light.  Symmetric. Trach in place, site clean, dry and intact attached to vent.     PULM: Symmetrical thorax expansion upon respiration. Coarse to auscultation bilaterally, no significant sputum production appreciated     CVS: Sinus tach, no murmurs, rubs, or gallops appreciated    ABD: Soft, nondistended, nontender, normoactive bowel sounds, no masses appreciated     EXT: + edema, b/l LE wrapped with dressings.  RUE wrapped with dressings as well.     SKIN: Warm and well perfused, several wounds covered in dressings    NEURO: Alert, oriented, interactive, +anxious      Medications:  doxycycline hyclate Capsule 100 milliGRAM(s) Oral every 12 hours  fluconAZOLE IVPB 400 milliGRAM(s) IV Intermittent every 24 hours  cloNIDine 0.1 milliGRAM(s) Oral every 8 hours  acetaminophen   Tablet .. 650 milliGRAM(s) Oral every 4 hours PRN  ALPRAZolam 0.5 milliGRAM(s) Oral every 6 hours PRN  dexMEDEtomidine Infusion 0.4 MICROgram(s)/kG/Hr IV Continuous <Continuous>  hydrOXYzine hydrochloride 50 milliGRAM(s) Oral every 4 hours PRN  melatonin 5 milliGRAM(s) Oral at bedtime  oxyCODONE    IR 5 milliGRAM(s) Oral every 6 hours PRN  enoxaparin Injectable 40 milliGRAM(s) SubCutaneous daily  famotidine    Tablet 20 milliGRAM(s) Oral two times a day  loperamide 2 milliGRAM(s) Oral every 4 hours PRN  glucagon  Injectable 1 milliGRAM(s) IntraMuscular once PRN  ascorbic acid 500 milliGRAM(s) Oral daily  folic acid 1 milliGRAM(s) Oral daily  multivitamin 1 Tablet(s) Oral daily  thiamine 100 milliGRAM(s) Oral daily  chlorhexidine 0.12% Liquid 15 milliLiter(s) Oral Mucosa every 12 hours  collagenase Ointment 1 Application(s) Topical daily  Dakins Solution - 1/4 Strength 1 Application(s) Topical daily  silver sulfADIAZINE 1% Cream 1 Application(s) Topical daily      Mode: CPAP with PS  RR (machine): 0  FiO2: 30  PEEP: 5  PS: 15  PIP: 29      ICU Vital Signs Last 24 Hrs  T(C): 37.9 (15 Jul 2020 10:00), Max: 38.4 (2020 21:21)  T(F): 100.2 (15 Jul 2020 10:00), Max: 101.1 (2020 21:21)  HR: 91 (15 Jul 2020 13:24) (85 - 133)  BP: 105/71 (15 Jul 2020 13:00) (100/58 - 126/80)  BP(mean): 77 (15 Jul 2020 13:00) (67 - 93)  ABP: --  ABP(mean): --  RR: --  SpO2: 100% (15 Jul 2020 13:24) (94% - 100%)    Vital Signs Last 24 Hrs  T(C): 37.9 (15 Jul 2020 10:00), Max: 38.4 (2020 21:21)  T(F): 100.2 (15 Jul 2020 10:00), Max: 101.1 (2020 21:21)  HR: 91 (15 Jul 2020 13:24) (85 - 133)  BP: 105/71 (15 Jul 2020 13:00) (100/58 - 126/80)  BP(mean): 77 (15 Jul 2020 13:00) (67 - 93)  RR: --  SpO2: 100% (15 Jul 2020 13:24) (94% - 100%)      I&O's Detail    2020 07:01  -  15 Jul 2020 07:00  --------------------------------------------------------  IN:    dexmedetomidine Infusion: 238 mL    Enteral Tube Flush: 558 mL    Free Water: 160 mL    Osmolite: 680 mL    Solution: 200 mL  Total IN: 1836 mL    OUT:    Incontinent per Collection Ba mL    Voided: 350 mL  Total OUT: 700 mL  Total NET: 1136 mL      CULTURES:  Culture Results:   Rare Staphylococcus aureus ( @ 09:30)      RADIOLOGY:   < from: US Duplex Arterial Lower Ext Compl, Bilateral (20 @ 14:49) >  EXAM:  US DPLX LWR EXT ARTS COMPL BI                          PROCEDURE DATE:  2020     INTERPRETATION:  US LOWER EXTREMITY ARTERIAL DUPLEX COMPLETE BILATERAL    HISTORY:  Bilateral Lower Extremity Ulcers    Real-time sonography of thebilateral lower extremity arterial system was performed using a high-resolution linear array transducer and including color and spectral Doppler.     Monophasic waveforms are seen throughout the imaged lower extremity arteries. No abnormal step up ofpeak systolic velocities are seen in either lower extremity to suggest a hemodynamically significant stenosis.    Subcutaneous edema is seen throughout both lower extremities. Fluid collections are noted in both groins measuring up to 4.3 cm on the right.    Flow phase patterns and peak systolic velocity measurements (in cm/s) were observed as follows:    RIGHT:  CFA:  Monophasic; 198   Proximal SFA: Monophasic; 102   Mid SFA:Monophasic; 140   Distal SFA:  Monophasic;  104   Popliteal:  Monophasic;  79   Anterior tibial :  Monophasic; 42   Posterior tibial: Monophasic; 56   Peroneal:  Monophasic;  40   Dorsalis pedis: Monophasic;  42     LEFT:  CFA:  Monophasic; 214   Proximal SFA: Monophasic; 131   Mid SFA:Monophasic; 104   Distal SFA:  Monophasic; 104   Popliteal:  Monophasic;  102   Anterior tibial :  Monophasic; 40   Posterior tibial: Monophasic; 68   Peroneal:  Monophasic; 47   Dorsalis pedis:  Monophasic;  46     IMPRESSION:    No evidence of a hemodynamically significant stenosis or occlusion in the visualized lower extremity arteries.    Subcutaneous edema throughout both lower extremities with fluid collections in both groins.    Monophasic waveforms throughout both lower extremities.    ANTHONY CANTU M.D., ATTENDING RADIOLOGIST  This document has been electronically signed. 2020  3:38PM    < end of copied text >      SUPPLEMENTAL O2: AC/VC - Trach  LINES:  peripheral   IVF: N  MARI: Y  PPx: PPI  CONTACT: Y, MRSA

## 2020-07-15 NOTE — PROGRESS NOTE ADULT - ASSESSMENT
Assessment: 40 y/o female seen by podiatry at bedside in the CCU for the followin.) Full Thickness Fibronecrotic Ulcer of Right fifth digit  2.) Full Thickness Fibronecrotic Ulcer with exposed peroneal tendon, lateral aspect RLE  3.) Full thickness ulcer with exposed Achilles tendon LT  4.) Full thickness ulcer to lateral malleolus, LLE  5) Full thickness ulceration, dorsum of left 1st MTPJ  6) Partial Thickness ulcer 4th interspace, Lt foot  7) Non gradable Eschar of 3rd digit, Lt foot  8) Multiple hyperpigmented scar-like lesions scattered all over LE, bl  9) Pain in B/L Lower Extremities     Plan:  - Chart reviewed and patient evaluated by Podiatry team.  - X-rays of the b/l LEs reviewed at this time. Case discussed with radiologist in depth. Please note oval radiolucencies visualized in the b/l LEs X-rays are due to air in exposed open wounds.   - Prelim wound Cx. left 1st MTPJ full-thickness ulceration, reviewed  - All wounds to b/l LEs irrigated with copious amounts of sterile saline.  - Dakins soaked gauzed applied to full-thickness ulceration to right fifth digit.   - Adaptic applied over exposed peroneal tendons to RLE.   - Adaptic applied over exposed Achilles tendon of LLE.   - Silvadene applied to wounds of L 3rd digit and L lateral malleolus ulceration.  - The b/l feet and ankles were wrapped with 4x4 gauze, ABD pad and kerlix, then secured with tape.   - Patient's right and left heels to be offloaded in b/l Z-flex boots to be worn at all times when patient bedbound.  - Vascular Consult, appreciated.   - Discussed case with ID department given purulence exuded from full thickness ulceration to left 1st MPJ and that deep wound cx was taken, Abx per ID appreciated.  - Discussed with CCU doctor the case, and recommendations, appreciated   - No podiatric surgical intervention necessary at this time as no abscess collections found to b/l lower extremities   - At this time, podiatry will provide supportive, and noninvasive wound care to the wounds, b/l lower extremities, given the fact that the patient is deemed in non stable medically and at risk for acute decompensation.  - Podiatry will follow the case closely while in house.

## 2020-07-15 NOTE — DISCHARGE NOTE NURSING/CASE MANAGEMENT/SOCIAL WORK - PATIENT PORTAL LINK FT
You can access the FollowMyHealth Patient Portal offered by [facility] by registering at the following website: [email or web address]. By joining [facility]’s FollowMyHealth portal, you will also be able to view your health information using other applications (apps) compatible with our system.

## 2020-07-15 NOTE — PROGRESS NOTE ADULT - ATTENDING COMMENTS
Patient seen and examined. No incisions present on abdomen and Ct abd reviewed. Stable for PEG tube tomorrow. Consent obtained from . All risks and benefits for the procedure explained to the  who was agreeable to move forward.

## 2020-07-15 NOTE — CHART NOTE - NSCHARTNOTEFT_GEN_A_CORE
Assessment:   *38 yo female presented to ED due to weakness, failure to thrive. Found to have acute anemia, NATALIA, transaminitis, metabolic lactic acidosis, severe dehydration, severe protein calorie malnutrition. Patient was found to have severe sepsis with septic shock secondary to MRSA bacteremia and UTI, developed worsening respiratory failure and was ultimately intubated on 6/27. Also found to be fungemic. s/p trach on 7/14.  pending PEG placement on 7/16.    *labs reviewed; last labs drawn on 7/13; lytes WNL.    *(+2) generalized.  (+3) Lt/Rt hand edema.  urine output: 700mL.  BM (+) 7/12 (diarrhea).    *pt with wt gain noted; possible fluid gain? in v/o edema. continue to check wt weekly wt track/trend changes.    *per flowsheet, pt has received an average of ~630mL/day of Osmolite 1.5 over the past 2 days.  combined with Prosource TF; pt received ~1025Kcal and 62g protein.  Which meets ~75% of estimated calorie needs and ~70% of estimated protein needs.  Rec'd change TF Rx to formula with fiber to aide with diarrhea.  rec'd change TF to Promote @ 75mL/hr.  Which will provide ~1500Kcal, 94g protein, 1247mL free water, and total TF volume of 1500mL.  monitor hydration and add free water flushes prn (consider additional free water flushes of ~10mL q1hr = 200mL additional free water daily).    Recommendations:  1) change TF to Promote @ 75mL/h  2) monitor hydration status; consider free water flushes of 10mL q1hr  3) monitor TF tolerance; keep back of bed > 35 degrees  4) obtain new wt weekly to track/trend changes      Diet Prescription: Diet, NPO with Tube Feed:   Tube Feeding Modality: Orogastric  Osmolite 1.5 (OSMOLITE1.5)  Total Volume for 24 Hours (mL): 720  Continuous  Until Goal Tube Feed Rate (mL per Hour): 30  Tube Feed Duration (in Hours): 24  Tube Feed Start Time: 21:00  Prosource Gelatein Plus     Qty per Day:  2 (07-10-20 @ 20:49)  Diet, NPO after Midnight:      NPO Start Date: 15-Jul-2020,   NPO Start Time: 23:59 (07-15-20 @ 07:42)      Wt Hx:  128.3# (7/15)  121.6# (7/7)  6/28- 125.2# Admission weight 6/25- 116.6#   Height (cm): 165.1 (06-23-20 @ 20:37)  Weight (kg): 54.4 (06-23-20 @ 20:37)  BMI (kg/m2): 20 (06-23-20 @ 20:37)      07-14-20 @ 07:01  -  07-15-20 @ 07:00  --------------------------------------------------------  IN: 1836 mL / OUT: 700 mL / NET: 1136 mL        Estimated Needs:   Weight Used for Calculation: 55Kg (wt from 7/7)    Estimated Energy Needs ( 25-30 calories/kg): 8595-8431 calories  Estimated Protein Needs (1.6-1.8 gm/kg): 88-99gm protein  Estimated Fluid Needs (<20 ml/kg):1000ml      Pertinent Labs: 07-13 Na142 mmol/L Glu 88 mg/dL K+ 4.1 mmol/L Cr  0.90 mg/dL BUN 36 mg/dL<H> 07-13 Phos 4.2 mg/dL      Skin: john paul score = 14  stage 2 PU on sacrum    Monitoring and Evaluation:   [x] PO intake/Nutr support infusion [ x ] Tolerance to Nutr [ x ] weights [ x ] labs[ x ] follow up per protocol  [ ] other:

## 2020-07-15 NOTE — PROGRESS NOTE ADULT - SUBJECTIVE AND OBJECTIVE BOX
Date of service: 7/15/2020  Chief Complaint: B/L lower extremity wounds     HPI: Pt was examined at bedside by podiatry with attending present, for B/L lower extremity ulcerative lesions. Pt is a 40 yo female who has been admitted to the CCU for septic shock, UTI with MRSA and bacteremia. Noted Pt is currently intubated and non verbal, she is alert and oriented. The patient is also being treated for anemia, acute respiratory failure and toxic metabolic encephalopathy. Of Note, Pt has a history of IVDA drug use and is critically ill and at risk for acute decompensation possible death per the intensivist. Due to the patient being intubated all additional medical history and information was gathered from the patients chart.    7/15: Patient seen and examined by podiatry with the attending present; for follow-up of right and left lower extremity wounds. Patient noted be arousable at bedside and patient s/p tracheostomy procedure performed on 7/13/2020. Patient noted to be responsive to stimuli when her lower extremities are examined by Podiatry. All further HPI cannot be obtained due to patient's current condition and obtained from patient's chart.     REVIEW OF SYSTEMS:  Unable to assess due to pt's inability to communicate per intubation    Allergies:  morphine (Unknown)    Past Medical History:  Lupus, RA, Reynard's Epilepsy, IVDA     Family History:  No pertinent family history in first degree relatives: cannot recall family history at this time    Social History:  lives alone  single  smokes 4 cig/day  uses two bags of heroin a day (24 Jun 2020 01:21)    MEDICATIONS  (STANDING):  ascorbic acid 500 milliGRAM(s) Oral daily  chlorhexidine 0.12% Liquid 15 milliLiter(s) Oral Mucosa every 12 hours  cloNIDine 0.1 milliGRAM(s) Oral every 8 hours  collagenase Ointment 1 Application(s) Topical daily  Dakins Solution - 1/4 Strength 1 Application(s) Topical daily  dexMEDEtomidine Infusion 0.4 MICROgram(s)/kG/Hr (5.44 mL/Hr) IV Continuous <Continuous>  doxycycline hyclate Capsule 100 milliGRAM(s) Oral every 12 hours  enoxaparin Injectable 40 milliGRAM(s) SubCutaneous daily  famotidine    Tablet 20 milliGRAM(s) Oral two times a day  fluconAZOLE IVPB 400 milliGRAM(s) IV Intermittent every 24 hours  folic acid 1 milliGRAM(s) Oral daily  melatonin 5 milliGRAM(s) Oral at bedtime  multivitamin 1 Tablet(s) Oral daily  silver sulfADIAZINE 1% Cream 1 Application(s) Topical daily  thiamine 100 milliGRAM(s) Oral daily    MEDICATIONS  (PRN):  acetaminophen   Tablet .. 650 milliGRAM(s) Oral every 4 hours PRN Temp greater or equal to 38C (100.4F), Mild Pain (1 - 3)  ALPRAZolam 0.5 milliGRAM(s) Oral every 6 hours PRN Anxiety  glucagon  Injectable 1 milliGRAM(s) IntraMuscular once PRN Glucose <70 milliGRAM(s)/deciLiter  hydrOXYzine hydrochloride 50 milliGRAM(s) Oral every 4 hours PRN Anxiety  loperamide 2 milliGRAM(s) Oral every 4 hours PRN Loose stool  oxyCODONE    IR 5 milliGRAM(s) Oral every 6 hours PRN Moderate Pain (4 - 6)      Vitals:   Vital Signs Last 24 Hrs  T(C): 37.9 (15 Jul 2020 10:00), Max: 38.4 (14 Jul 2020 21:21)  T(F): 100.2 (15 Jul 2020 10:00), Max: 101.1 (14 Jul 2020 21:21)  HR: 98 (15 Jul 2020 11:00) (85 - 133)  BP: 117/74 (15 Jul 2020 11:00) (100/58 - 126/80)  BP(mean): 83 (15 Jul 2020 11:00) (67 - 93)  SpO2: 100% (15 Jul 2020 11:00) (94% - 100%)    Physical examination:  Constitutional: Pt intubated, lying in bed    Focused LE examination:   Vasc: DP and PT pulses non palpable b/l, CFT < 5 x 10, TG: warm to warm  Neuro and MSK: Unable to assess b/c pt is intubated    Derm:  Noted Multiple hyperpigmented scar-like lesions scattered all over LE, bl    Rt LE  - Full thickness ulcer with exposed peroneal tendons on lateral aspect of distal RLE that + probes to Fibula, no drainage, + undermining all around the clock, + tenderness. Negative crepitus or fluctuance to area of lateral aspect of the distal RLE.  - Rt 5th toe demonstrates a full thickness with less fibronecotic wound bed compared to the prior examination, measuring approx. 7.2 x 4.6 x 0.8 cm, - purulence, - malodor, - fluctuance +Probe to 5th metatarsal head, EDL tendon exposed and + undermining from the medial aspect, + tunneling to the 9 hour.     Lt LE:  -Full thickness ulcer round in shape and approx  0.5 cm distal to lateral malleolus, tunneling to 3 and 6 o'clock positions, + undermining all around, - drainage, - fluctuance - malodor, + probe to bone to the lateral malleolus, - purulence; negative crepitus and - fluctuance  - Multiple Full thickness ulcerations with exposed Achilles tendon noted to the posterior surface of the left distal 1/3 aspect of the left leg.  -Swelling and mild edema noted over the medial malleolus of LLE. Edema noted to the dorsal-lateral aspect of the left fifth MPJ, noted to be mostly likely pressure induced; area of black-blue discoloration noted to the the left fifth sub met 5 region.   -Partial thickness ulcer, 4th interdigital space with presence of dry blood, - probe to bone, - undermining - tunneling  - Non gradable eschar on 3rd digit on Lt foot  - Full thickness ulceration noted to the dorsum of the left 1st MPJ. Positive probing to the left 1st MPJ. Positive purulence exuded from the wound with milking of the left foot.      LABS:                      9.3    12.13 )-----------( 409      ( 13 Jul 2020 10:15 )             29.3     07-13  142  |  115<H>  |  36<H>  ----------------------------<  88  4.1   |  20<L>  |  0.90    Ca    7.6<L>      13 Jul 2020 10:15  Phos  4.2     07-13  Mg     1.8     07-13    Culture - Other (07.13.20 @ 09:30)    Specimen Source: .Other L 1st MPJ deep wound cx    Culture Results:   Rare Staphylococcus aureus      Blood Culture:   07-06 @ 14:30  Organism Blood Culture PCR  Gram Stain Blood -- Gram Stain   Growth in aerobic bottle: Yeast like cells  Specimen Source .Blood Blood-Peripheral  Culture-Blood --    Culture - Blood in AM (07.06.20 @ 14:30)    -  Methicillin resistant Staphylococcus aureus (MRSA): Nondet    -  Coagulase negative Staphylococcus: Nondet    -  Enterococcus species: Nondet    -  Vancomycin resistant Enterococcus sp.: Nondet    -  Escherichia coli: Nondet    -  Klebsiella oxytoca: Nondet    -  Klebsiella pneumoniae: Nondet    -  Serratia marcescens: Nondet    -  Haemophilus influenzae: Nondet    -  Listeria monocytogenes: Nondet    -  Neisseria meningitidis: Nondet    -  Pseudomonas aeruginosa: Nondet    -  Acinetobacter baumanii: Nondet    -  Enterobacter cloacae complex: Nondet    -  Streptococcus sp. (Not Grp A, B or S pneumoniae): Nondet    -  Streptococcus agalactiae (Group B): Nondet    -  Streptococcus pyogenes (Group A): Nondet    -  Streptococcus pneumoniae: Nondet    -  Candida albicans: Nondet    -  Candida glabrata: Nondet    -  Candida krusei: Nondet    -  Candida parapsilosis: Nondet    -  Candida tropicalis: Nondet    -  Multidrug (KPC pos) resistant organism: Nondet    -  Staphylococcus aureus: Nondet    Gram Stain:   Growth in aerobic bottle: Yeast like cells    Specimen Source: .Blood Blood-Peripheral    Organism: Blood Culture PCR    Culture Results:   Growth in aerobic bottle: Brittny dubliniensis  "Due to technical problems, Proteus sp. will Not be reported as part of  the BCID panel until further notice"  ***Blood Panel PCR results on this specimen are available  approximately 3 hours after the Gram stain result.***  Gram stain, PCR, and/or culture results may not always  correspond due to difference in methodologies.      Imaging:    < from: US Duplex Arterial Lower Ext Compl, Bilateral (07.14.20 @ 14:49) >  IMPRESSION:  No evidence of a hemodynamically significant stenosis or occlusion in the visualized lower extremity arteries.  Subcutaneous edema throughout both lower extremities with fluid collections in both groins.  Monophasic waveforms throughout both lower extremities.  < end of copied text >      < from: TTE Echo Complete w/o Contrast w/ Doppler (06.27.20 @ 08:58) >   Impression   Summary   The mid to apical anterolateral to anterior segments are severely   hypokinetic. The apical inferoseptal wall appears hypokinetic.   Estimated left ventricular ejection fraction is 45-50 %.   A catheter wire is seen in the right atrium.   Mild (1+) tricuspid valve regurgitation is present.   Mild pulmonary hypertension.  < end of copied text >      < from: Xray Ankle Complete 3 Views, Bilateral (07.09.20 @ 16:42) >  EXAM:  XR ANKLE COMP MIN 3 VIEWS BI                        *** ADDENDUM 07/10/2020  ***                                                           Addendum:    The posterior soft tissue gas of the left ankle may be related to skin ulcerationor early fistulous tract formation. Clinical correlation is suggested.  *** END OF ADDENDUM 07/10/2020  ***  PROCEDURE DATE:  07/09/2020    INTERPRETATION:  Bilateral ankles  HISTORY: Bilateral ulcers    Three views of the right ankle and three views of the left ankle show no evidence of fracture nor destructive change. The joint spaces are maintained.  Subcutaneous soft tissue gas is present behind the left ankle.  IMPRESSION: Soft tissue gas as noted.  Thank you for this referral  ***Please see the addendum at the top of this report. It may contain additional important information or changes.****  < end of copied text >

## 2020-07-15 NOTE — PROGRESS NOTE ADULT - SUBJECTIVE AND OBJECTIVE BOX
Subjective:  No acute events overnight.    Vital Signs:  Vital Signs Last 24 Hrs  T(C): 36.9 (07-15-20 @ 04:00), Max: 38.4 (07-14-20 @ 21:21)  T(F): 98.5 (07-15-20 @ 04:00), Max: 101.1 (07-14-20 @ 21:21)  HR: 94 (07-15-20 @ 08:42) (85 - 133)  BP: 112/73 (07-15-20 @ 08:00) (100/58 - 126/80)  SpO2: 100% (07-15-20 @ 08:42) (94% - 100%) on 30% FiO2 PEEP 5    Telemetry/Alarms: sinus rhythm    Relevant labs, radiology and Medications reviewed                        9.3    12.13 )-----------( 409      ( 13 Jul 2020 10:15 )             29.3     07-13    142  |  115<H>  |  36<H>  ----------------------------<  88  4.1   |  20<L>  |  0.90    Ca    7.6<L>      13 Jul 2020 10:15  Phos  4.2     07-13  Mg     1.8     07-13        MEDICATIONS  (STANDING):  ascorbic acid 500 milliGRAM(s) Oral daily  chlorhexidine 0.12% Liquid 15 milliLiter(s) Oral Mucosa every 12 hours  cloNIDine 0.1 milliGRAM(s) Oral every 8 hours  collagenase Ointment 1 Application(s) Topical daily  Dakins Solution - 1/4 Strength 1 Application(s) Topical daily  dexMEDEtomidine Infusion 0.4 MICROgram(s)/kG/Hr (5.44 mL/Hr) IV Continuous <Continuous>  doxycycline hyclate Capsule 100 milliGRAM(s) Oral every 12 hours  enoxaparin Injectable 40 milliGRAM(s) SubCutaneous daily  famotidine    Tablet 20 milliGRAM(s) Oral two times a day  fluconAZOLE IVPB 400 milliGRAM(s) IV Intermittent every 24 hours  folic acid 1 milliGRAM(s) Oral daily  melatonin 5 milliGRAM(s) Oral at bedtime  multivitamin 1 Tablet(s) Oral daily  silver sulfADIAZINE 1% Cream 1 Application(s) Topical daily  thiamine 100 milliGRAM(s) Oral daily    MEDICATIONS  (PRN):  acetaminophen   Tablet .. 650 milliGRAM(s) Oral every 4 hours PRN Temp greater or equal to 38C (100.4F), Mild Pain (1 - 3)  ALPRAZolam 0.5 milliGRAM(s) Oral every 6 hours PRN Anxiety  glucagon  Injectable 1 milliGRAM(s) IntraMuscular once PRN Glucose <70 milliGRAM(s)/deciLiter  hydrOXYzine hydrochloride 50 milliGRAM(s) Oral every 4 hours PRN Anxiety  loperamide 2 milliGRAM(s) Oral every 4 hours PRN Loose stool  oxyCODONE    IR 5 milliGRAM(s) Oral every 6 hours PRN Moderate Pain (4 - 6)      Physical exam  Gen: NAD  Neuro: AO x 3   Card: S1 S2  Pulm: CTA  Abd: Soft NT ND  Ext: all extremities have chronic ulcerations and edema      I&O's Summary    14 Jul 2020 07:01  -  15 Jul 2020 07:00  --------------------------------------------------------  IN: 1836 mL / OUT: 700 mL / NET: 1136 mL        Assessment  39y Female  w/ PAST MEDICAL & SURGICAL HISTORY:  Smoker  Heroin abuse  H/O Raynaud's syndrome  Rheumatoid arthritis  Lupus  Gall bladder stones  H/O appendicitis  H/O tubal ligation    Patient is a 39y old  Female who presents with a chief complaint of Cellulitis with sepsis, symptomatic anemia. (14 Jul 2020 13:39)    39y Female h/o lupus, RA, epilepsy (last seizure 8 yrs ago), chronic pain, IVDA who presented to ED due to weakness, failure to thrive. Found to have acute anemia, NATALIA, transaminitis, metabolic lactic acidosis, severe dehydration, severe protein calorie malnutrition. Patient was found to have severe sepsis with septic shock secondary to MRSA bacteremia and UTI treated with a two-week course of Vancomycin, developed worsening respiratory failure and was ultimately intubated on 6/27/20.  Underwent tracheostomy for chronic respiratory failure, currently being treated for fungemia.    Will draw labs for procedure tomorrow myself.  NPO p MN for PEG.  Will do PEG bedside tomorrow, under sedation.      Discussed with Cardiothoracic Team at AM rounds.

## 2020-07-15 NOTE — PROGRESS NOTE ADULT - ASSESSMENT
NEURO:  CV:  RESP:  RENAL:  GI:  ENDO:   ID: Cellulitis, MRSA bacteremia/UTI on doxy and fluconazole, ID following.   HEME: Lovenox for ac  DISPO: full code. NEURO: Anxiolytic therapy on precedex, xanax, atarax.  Improvement anxiety. clonidine for weaning precedex.   CV: No active issues  RESP: Respiratory Failure s/p Patient complaining of sob today, tolerating pressure support, no increase in oxygenation requirements.  chest xray obtained, grossly unchanged, mild increase in bilateral cephalization, lasix ordered for gently diuresis.    RENAL: Monitor lytes, replace as needed.   GI: NPO with tf, having diarrhea.  Discussed change in tf for higher fiber content.  Will change post PEG in am.   ENDO: No active issues.   ID: Cellulitis, MRSA bacteremia/UTI on doxy and fluconazole, ID following.   HEME: Lovenox for ac  DISPO: full code.

## 2020-07-16 ENCOUNTER — APPOINTMENT (OUTPATIENT)
Dept: THORACIC SURGERY | Facility: HOSPITAL | Age: 40
End: 2020-07-16

## 2020-07-16 LAB
ALBUMIN SERPL ELPH-MCNC: 1.4 G/DL — LOW (ref 3.3–5)
ALP SERPL-CCNC: 201 U/L — HIGH (ref 40–120)
ALT FLD-CCNC: 25 U/L — SIGNIFICANT CHANGE UP (ref 12–78)
ANION GAP SERPL CALC-SCNC: 6 MMOL/L — SIGNIFICANT CHANGE UP (ref 5–17)
APTT BLD: 25.1 SEC — LOW (ref 27.5–35.5)
AST SERPL-CCNC: 46 U/L — HIGH (ref 15–37)
BILIRUB SERPL-MCNC: 0.3 MG/DL — SIGNIFICANT CHANGE UP (ref 0.2–1.2)
BLD GP AB SCN SERPL QL: SIGNIFICANT CHANGE UP
BUN SERPL-MCNC: 38 MG/DL — HIGH (ref 7–23)
CALCIUM SERPL-MCNC: 8.2 MG/DL — LOW (ref 8.5–10.1)
CHLORIDE SERPL-SCNC: 109 MMOL/L — HIGH (ref 96–108)
CO2 SERPL-SCNC: 23 MMOL/L — SIGNIFICANT CHANGE UP (ref 22–31)
CREAT SERPL-MCNC: 1.03 MG/DL — SIGNIFICANT CHANGE UP (ref 0.5–1.3)
GLUCOSE SERPL-MCNC: 78 MG/DL — SIGNIFICANT CHANGE UP (ref 70–99)
HCT VFR BLD CALC: 34.9 % — SIGNIFICANT CHANGE UP (ref 34.5–45)
HGB BLD-MCNC: 10.6 G/DL — LOW (ref 11.5–15.5)
INR BLD: 1.13 RATIO — SIGNIFICANT CHANGE UP (ref 0.88–1.16)
MAGNESIUM SERPL-MCNC: 1.8 MG/DL — SIGNIFICANT CHANGE UP (ref 1.6–2.6)
MCHC RBC-ENTMCNC: 29 PG — SIGNIFICANT CHANGE UP (ref 27–34)
MCHC RBC-ENTMCNC: 30.4 GM/DL — LOW (ref 32–36)
MCV RBC AUTO: 95.6 FL — SIGNIFICANT CHANGE UP (ref 80–100)
PHOSPHATE SERPL-MCNC: 5.2 MG/DL — HIGH (ref 2.5–4.5)
PLATELET # BLD AUTO: 435 K/UL — HIGH (ref 150–400)
POTASSIUM SERPL-MCNC: 4.6 MMOL/L — SIGNIFICANT CHANGE UP (ref 3.5–5.3)
POTASSIUM SERPL-SCNC: 4.6 MMOL/L — SIGNIFICANT CHANGE UP (ref 3.5–5.3)
PROT SERPL-MCNC: 7.6 GM/DL — SIGNIFICANT CHANGE UP (ref 6–8.3)
PROTHROM AB SERPL-ACNC: 13 SEC — SIGNIFICANT CHANGE UP (ref 10.6–13.6)
RBC # BLD: 3.65 M/UL — LOW (ref 3.8–5.2)
RBC # FLD: 16.1 % — HIGH (ref 10.3–14.5)
SODIUM SERPL-SCNC: 138 MMOL/L — SIGNIFICANT CHANGE UP (ref 135–145)
WBC # BLD: 7.69 K/UL — SIGNIFICANT CHANGE UP (ref 3.8–10.5)
WBC # FLD AUTO: 7.69 K/UL — SIGNIFICANT CHANGE UP (ref 3.8–10.5)

## 2020-07-16 PROCEDURE — 43246 EGD PLACE GASTROSTOMY TUBE: CPT

## 2020-07-16 PROCEDURE — 43246 EGD PLACE GASTROSTOMY TUBE: CPT | Mod: AS

## 2020-07-16 PROCEDURE — 99232 SBSQ HOSP IP/OBS MODERATE 35: CPT

## 2020-07-16 RX ORDER — HYDROMORPHONE HYDROCHLORIDE 2 MG/ML
0.5 INJECTION INTRAMUSCULAR; INTRAVENOUS; SUBCUTANEOUS ONCE
Refills: 0 | Status: DISCONTINUED | OUTPATIENT
Start: 2020-07-16 | End: 2020-07-16

## 2020-07-16 RX ADMIN — HYDROMORPHONE HYDROCHLORIDE 0.5 MILLIGRAM(S): 2 INJECTION INTRAMUSCULAR; INTRAVENOUS; SUBCUTANEOUS at 00:01

## 2020-07-16 RX ADMIN — Medication 650 MILLIGRAM(S): at 22:00

## 2020-07-16 RX ADMIN — Medication 100 MILLIGRAM(S): at 14:07

## 2020-07-16 RX ADMIN — Medication 2 MILLIGRAM(S): at 21:31

## 2020-07-16 RX ADMIN — Medication 100 MILLIGRAM(S): at 21:32

## 2020-07-16 RX ADMIN — CHLORHEXIDINE GLUCONATE 15 MILLILITER(S): 213 SOLUTION TOPICAL at 21:32

## 2020-07-16 RX ADMIN — Medication 650 MILLIGRAM(S): at 14:07

## 2020-07-16 RX ADMIN — Medication 1 APPLICATION(S): at 14:08

## 2020-07-16 RX ADMIN — Medication 5 MILLIGRAM(S): at 21:32

## 2020-07-16 RX ADMIN — Medication 1 APPLICATION(S): at 14:11

## 2020-07-16 RX ADMIN — Medication 100 MILLIGRAM(S): at 14:08

## 2020-07-16 RX ADMIN — Medication 0.1 MILLIGRAM(S): at 19:59

## 2020-07-16 RX ADMIN — Medication 1 MILLIGRAM(S): at 14:07

## 2020-07-16 RX ADMIN — Medication 0.5 MILLIGRAM(S): at 14:07

## 2020-07-16 RX ADMIN — Medication 650 MILLIGRAM(S): at 21:31

## 2020-07-16 RX ADMIN — HYDROMORPHONE HYDROCHLORIDE 0.5 MILLIGRAM(S): 2 INJECTION INTRAMUSCULAR; INTRAVENOUS; SUBCUTANEOUS at 10:00

## 2020-07-16 RX ADMIN — HYDROMORPHONE HYDROCHLORIDE 0.5 MILLIGRAM(S): 2 INJECTION INTRAMUSCULAR; INTRAVENOUS; SUBCUTANEOUS at 09:44

## 2020-07-16 RX ADMIN — CHLORHEXIDINE GLUCONATE 15 MILLILITER(S): 213 SOLUTION TOPICAL at 14:06

## 2020-07-16 RX ADMIN — Medication 650 MILLIGRAM(S): at 14:45

## 2020-07-16 RX ADMIN — Medication 1 TABLET(S): at 14:07

## 2020-07-16 RX ADMIN — Medication 50 MILLIGRAM(S): at 21:32

## 2020-07-16 RX ADMIN — Medication 0.5 MILLIGRAM(S): at 21:31

## 2020-07-16 RX ADMIN — Medication 0.1 MILLIGRAM(S): at 21:31

## 2020-07-16 RX ADMIN — HYDROMORPHONE HYDROCHLORIDE 0.5 MILLIGRAM(S): 2 INJECTION INTRAMUSCULAR; INTRAVENOUS; SUBCUTANEOUS at 00:31

## 2020-07-16 RX ADMIN — Medication 500 MILLIGRAM(S): at 14:06

## 2020-07-16 NOTE — BRIEF OPERATIVE NOTE - NSICDXBRIEFPROCEDURE_GEN_ALL_CORE_FT
PROCEDURES:  Open tracheostomy 13-Jul-2020 14:41:18  Benoit Kaye
PROCEDURES:  EGD, with PEG 16-Jul-2020 16:38:40  Eula Javier

## 2020-07-16 NOTE — PROGRESS NOTE ADULT - SUBJECTIVE AND OBJECTIVE BOX
Patient is a 39y old  Female who presents with a chief complaint of Cellulitis with sepsis, symptomatic anemia. (15 Jul 2020 13:44)      BRIEF HOSPITAL COURSE: ***    Events last 24 hours: ***    PAST MEDICAL & SURGICAL HISTORY:  Smoker  Heroin abuse  H/O Raynaud's syndrome  Rheumatoid arthritis  Lupus  Gall bladder stones  H/O appendicitis  H/O tubal ligation    Allergies    morphine (Unknown)    Intolerances      FAMILY HISTORY:  No pertinent family history in first degree relatives: cannot recall family history at this time      Social History:     Review of Systems:  CONSTITUTIONAL: No fever, chills, or fatigue  EYES: No eye pain, visual disturbances, or discharge  ENMT:  No difficulty hearing, tinnitus, vertigo; No sinus or throat pain  NECK: No pain or stiffness  RESPIRATORY: No cough, wheezing, chills or hemoptysis; No shortness of breath  CARDIOVASCULAR: No chest pain, palpitations, dizziness, or leg swelling  GASTROINTESTINAL: No abdominal or epigastric pain. No nausea, vomiting, or hematemesis; No diarrhea or constipation. No melena or hematochezia.  GENITOURINARY: No dysuria, frequency, hematuria, or incontinence  NEUROLOGICAL: No headaches, memory loss, loss of strength, numbness, or tremors  SKIN: No itching, burning, rashes, or lesions   MUSCULOSKELETAL: No joint pain or swelling; No muscle, back, or extremity pain  PSYCHIATRIC: No depression, anxiety, mood swings, or difficulty sleeping      Vitals During Exam:   HR:   BP:   RR:  sPO2:     Physical Examination:    General: No acute distress.      HEENT: Pupils equal, reactive to light.  Symmetric.    PULM: Clear to auscultation bilaterally, no significant sputum production    CVS: Regular rate and rhythm, no murmurs, rubs, or gallops    ABD: Soft, nondistended, nontender, normoactive bowel sounds, no masses    EXT: No edema, nontender    SKIN: Warm and well perfused, no rashes noted.    NEURO: Alert, oriented, interactive, nonfocal      Medications:  doxycycline hyclate Capsule 100 milliGRAM(s) Oral every 12 hours  fluconAZOLE IVPB 400 milliGRAM(s) IV Intermittent every 24 hours    cloNIDine 0.1 milliGRAM(s) Oral every 8 hours      acetaminophen   Tablet .. 650 milliGRAM(s) Oral every 4 hours PRN  ALPRAZolam 0.5 milliGRAM(s) Oral every 6 hours PRN  dexMEDEtomidine Infusion 0.4 MICROgram(s)/kG/Hr IV Continuous <Continuous>  hydrOXYzine hydrochloride 50 milliGRAM(s) Oral every 4 hours PRN  melatonin 5 milliGRAM(s) Oral at bedtime  oxyCODONE    IR 5 milliGRAM(s) Oral every 6 hours PRN      enoxaparin Injectable 40 milliGRAM(s) SubCutaneous daily    famotidine    Tablet 20 milliGRAM(s) Oral two times a day  loperamide 2 milliGRAM(s) Oral every 4 hours PRN      glucagon  Injectable 1 milliGRAM(s) IntraMuscular once PRN    ascorbic acid 500 milliGRAM(s) Oral daily  folic acid 1 milliGRAM(s) Oral daily  multivitamin 1 Tablet(s) Oral daily  thiamine 100 milliGRAM(s) Oral daily      chlorhexidine 0.12% Liquid 15 milliLiter(s) Oral Mucosa every 12 hours  collagenase Ointment 1 Application(s) Topical daily  Dakins Solution - 1/4 Strength 1 Application(s) Topical daily  silver sulfADIAZINE 1% Cream 1 Application(s) Topical daily        Mode: CPAP with PS  RR (machine): 0  FiO2: 30  PEEP: 5  PS: 15  ITime: 1  MAP: 9.6  PIP: 20      ICU Vital Signs Last 24 Hrs  T(C): 37.3 (2020 06:00), Max: 37.9 (15 Jul 2020 10:00)  T(F): 99.2 (2020 06:00), Max: 100.2 (15 Jul 2020 10:00)  HR: 95 (2020 07:55) (84 - 104)  BP: 114/80 (2020 07:00) (101/64 - 120/78)  BP(mean): 87 (2020 07:00) (72 - 88)  ABP: --  ABP(mean): --  RR: --  SpO2: 100% (2020 07:55) (100% - 100%)    Vital Signs Last 24 Hrs  T(C): 37.3 (2020 06:00), Max: 37.9 (15 Jul 2020 10:00)  T(F): 99.2 (2020 06:00), Max: 100.2 (15 Jul 2020 10:00)  HR: 95 (2020 07:55) (84 - 104)  BP: 114/80 (2020 07:00) (101/64 - 120/78)  BP(mean): 87 (2020 07:00) (72 - 88)  RR: --  SpO2: 100% (2020 07:55) (100% - 100%)        I&O's Detail    15 Jul 2020 07:01  -  2020 07:00  --------------------------------------------------------  IN:    dexmedetomidine Infusion: 171 mL    Enteral Tube Flush: 275 mL    Free Water: 180 mL    Osmolite: 310 mL    Solution: 200 mL  Total IN: 1136 mL    OUT:    Incontinent per Collection Ba mL    Voided: 970 mL  Total OUT: 1170 mL    Total NET: -34 mL            LABS:                        10.6   7.69  )-----------( 435      ( 2020 07:21 )             34.9     07-16    138  |  109<H>  |  38<H>  ----------------------------<  78  4.6   |  23  |  1.03    Ca    8.2<L>      2020 07:21  Phos  5.2     07-16  Mg     1.8     07-16    TPro  7.6  /  Alb  1.4<L>  /  TBili  0.3  /  DBili  x   /  AST  46<H>  /  ALT  25  /  AlkPhos  201<H>  07-16          CAPILLARY BLOOD GLUCOSE        PT/INR - ( 2020 07:21 )   PT: 13.0 sec;   INR: 1.13 ratio         PTT - ( 2020 07:21 )  PTT:25.1 sec    CULTURES:  Culture Results:   Rare Methicillin resistant Staphylococcus aureus ( @ 09:30)      RADIOLOGY:   < from: Xray Chest 1 View-PORTABLE IMMEDIATE (07.15.20 @ 14:56) >  EXAM:  XR CHEST PORTABLE IMMED 1V                          PROCEDURE DATE:  07/15/2020      INTERPRETATION:  AP semierect chest on July 15, 2020 1:26 PM. 2 views. Patient requires tracheostomy. Covid virus test was negative on . Patient has history of lupus. Patient is a drug abuser. Patient presented with fever and sepsis.    Heart is magnified by technique.    Tracheostomy and nasogastric tube remain.    Left lower lobe atelectasis is again noted.    Somewhat increased interstitial markings in the right lung again seen.    Chest is similar to .    IMPRESSION: Unchanged chest as above.    SUMANTH ACUNA M.D., ATTENDING RADIOLOGIST  This document has been electronically signed. Jul 15 2020  3:00PM    < end of copied text >      SUPPLEMENTAL O2: AC/VC Trach  LINES: Peripheral   IVF: N  MARI: Y  PPx: PPI, Lovenox  CONTACT: Y, MRSA Patient is a 39y old  Female who presents with a chief complaint of Cellulitis with sepsis, symptomatic anemia. (15 Jul 2020 13:44)      BRIEF HOSPITAL COURSE:   38 yo f pmhx IVDA, lupus, RA, epilepsy (last seizure 8 years ago), recent use of snorting heroin (2 bags daily) presented with FTF admitted with anemia, NATALIA, transaminitis, metabolic lactic acidosis, severe dehydration/malnutrition, respiratory failure requiring intubation now s/p trach (), septic shock 2/2 MRSA bacteremia and UTI now fungemic.     Events last 24 hours:   Peg scheduled for ~1300.  Dressing changes revealing new worsening wounds over right knee now with what appears to have an exposed tendon. Plastic consult placed.       PAST MEDICAL & SURGICAL HISTORY:  Smoker  Heroin abuse  H/O Raynaud's syndrome  Rheumatoid arthritis  Lupus  Gall bladder stones  H/O appendicitis  H/O tubal ligation    Allergies  morphine (Unknown)      FAMILY HISTORY:  No pertinent family history in first degree relatives: cannot recall family history at this time      Social History:   IVDA       Review of Systems:  Patient anxious/in pain      Physical Examination:    General:  Female lying in bed, appears uncomfortable, cachectic    HEENT: NC/AT, Pupils equal, reactive to light.  Symmetric. Trach in place, site clean, dry and intact attached to vent.     PULM: Symmetrical thorax expansion upon respiration. Coarse to auscultation bilaterally, no significant sputum production appreciated     CVS: Sinus tach, no murmurs, rubs, or gallops appreciated    ABD: Soft, nondistended, nontender, normoactive bowel sounds, no masses appreciated     EXT: + edema, b/l LE wrapped with dressings.  RUE wrapped with dressings as well.     SKIN: Warm and well perfused, several wounds covered in dressings    NEURO: Alert, interactive, +anxious      Medications:  doxycycline hyclate Capsule 100 milliGRAM(s) Oral every 12 hours  fluconAZOLE IVPB 400 milliGRAM(s) IV Intermittent every 24 hours  cloNIDine 0.1 milliGRAM(s) Oral every 8 hours  acetaminophen   Tablet .. 650 milliGRAM(s) Oral every 4 hours PRN  ALPRAZolam 0.5 milliGRAM(s) Oral every 6 hours PRN  dexMEDEtomidine Infusion 0.4 MICROgram(s)/kG/Hr IV Continuous <Continuous>  hydrOXYzine hydrochloride 50 milliGRAM(s) Oral every 4 hours PRN  melatonin 5 milliGRAM(s) Oral at bedtime  oxyCODONE    IR 5 milliGRAM(s) Oral every 6 hours PRN  enoxaparin Injectable 40 milliGRAM(s) SubCutaneous daily  famotidine    Tablet 20 milliGRAM(s) Oral two times a day  loperamide 2 milliGRAM(s) Oral every 4 hours PRN  glucagon  Injectable 1 milliGRAM(s) IntraMuscular once PRN  ascorbic acid 500 milliGRAM(s) Oral daily  folic acid 1 milliGRAM(s) Oral daily  multivitamin 1 Tablet(s) Oral daily  thiamine 100 milliGRAM(s) Oral daily  chlorhexidine 0.12% Liquid 15 milliLiter(s) Oral Mucosa every 12 hours  collagenase Ointment 1 Application(s) Topical daily  Dakins Solution - 1/4 Strength 1 Application(s) Topical daily  silver sulfADIAZINE 1% Cream 1 Application(s) Topical daily      Mode: CPAP with PS  RR (machine): 0  FiO2: 30  PEEP: 5  PS: 15  ITime: 1  MAP: 9.6  PIP: 20      ICU Vital Signs Last 24 Hrs  T(C): 37.3 (2020 06:00), Max: 37.9 (15 Jul 2020 10:00)  T(F): 99.2 (2020 06:00), Max: 100.2 (15 Jul 2020 10:00)  HR: 95 (2020 07:55) (84 - 104)  BP: 114/80 (2020 07:00) (101/64 - 120/78)  BP(mean): 87 (2020 07:00) (72 - 88)  ABP: --  ABP(mean): --  RR: --  SpO2: 100% (2020 07:55) (100% - 100%)    Vital Signs Last 24 Hrs  T(C): 37.3 (2020 06:00), Max: 37.9 (15 Jul 2020 10:00)  T(F): 99.2 (2020 06:00), Max: 100.2 (15 Jul 2020 10:00)  HR: 95 (2020 07:55) (84 - 104)  BP: 114/80 (2020 07:00) (101/64 - 120/78)  BP(mean): 87 (2020 07:00) (72 - 88)  RR: --  SpO2: 100% (2020 07:55) (100% - 100%)        I&O's Detail    15 Jul 2020 07:01  -  2020 07:00  --------------------------------------------------------  IN:    dexmedetomidine Infusion: 171 mL    Enteral Tube Flush: 275 mL    Free Water: 180 mL    Osmolite: 310 mL    Solution: 200 mL  Total IN: 1136 mL    OUT:    Incontinent per Collection Ba mL    Voided: 970 mL  Total OUT: 1170 mL  Total NET: -34 mL      LABS:                        10.6   7.69  )-----------( 435      ( 2020 07:21 )             34.9     07-16    138  |  109<H>  |  38<H>  ----------------------------<  78  4.6   |  23  |  1.03    Ca    8.2<L>      2020 07:21  Phos  5.2     07-16  Mg     1.8     07-16    TPro  7.6  /  Alb  1.4<L>  /  TBili  0.3  /  DBili  x   /  AST  46<H>  /  ALT  25  /  AlkPhos  201<H>  07-16      PT/INR - ( 2020 07:21 )   PT: 13.0 sec;   INR: 1.13 ratio    PTT - ( 2020 07:21 )  PTT:25.1 sec      CULTURES:  Culture Results:   Rare Methicillin resistant Staphylococcus aureus ( @ 09:30)      RADIOLOGY:   < from: Xray Chest 1 View-PORTABLE IMMEDIATE (07.15.20 @ 14:56) >  EXAM:  XR CHEST PORTABLE IMMED 1V                          PROCEDURE DATE:  07/15/2020      INTERPRETATION:  AP semierect chest on July 15, 2020 1:26 PM. 2 views. Patient requires tracheostomy. Covid virus test was negative on . Patient has history of lupus. Patient is a drug abuser. Patient presented with fever and sepsis.    Heart is magnified by technique.    Tracheostomy and nasogastric tube remain.    Left lower lobe atelectasis is again noted.    Somewhat increased interstitial markings in the right lung again seen.    Chest is similar to .    IMPRESSION: Unchanged chest as above.    SUMANTH ACUNA M.D., ATTENDING RADIOLOGIST  This document has been electronically signed. Jul 15 2020  3:00PM    < end of copied text >      SUPPLEMENTAL O2: AC/VC Trach  LINES: Peripheral   IVF: N  MARI: Y  PPx: PPI, Lovenox  CONTACT: Y, MRSA

## 2020-07-16 NOTE — PROGRESS NOTE ADULT - SUBJECTIVE AND OBJECTIVE BOX
Subjective:  Pt seen, awake on CPAP on vent. NPO for PEG>    Vital Signs:  Vital Signs Last 24 Hrs  T(C): 36.7 (07-16-20 @ 12:44), Max: 37.4 (07-15-20 @ 21:53)  T(F): 98 (07-16-20 @ 12:44), Max: 99.4 (07-15-20 @ 21:53)  HR: 118 (07-16-20 @ 15:00) (85 - 118)  BP: 126/83 (07-16-20 @ 15:00) (101/65 - 126/83)  RR: --  SpO2: 100% (07-16-20 @ 15:00) (100% - 100%) on (O2)    Telemetry/Alarms:    Relevant labs, radiology and Medications reviewed                        10.6   7.69  )-----------( 435      ( 16 Jul 2020 07:21 )             34.9     07-16    138  |  109<H>  |  38<H>  ----------------------------<  78  4.6   |  23  |  1.03    Ca    8.2<L>      16 Jul 2020 07:21  Phos  5.2     07-16  Mg     1.8     07-16    TPro  7.6  /  Alb  1.4<L>  /  TBili  0.3  /  DBili  x   /  AST  46<H>  /  ALT  25  /  AlkPhos  201<H>  07-16    PT/INR - ( 16 Jul 2020 07:21 )   PT: 13.0 sec;   INR: 1.13 ratio         PTT - ( 16 Jul 2020 07:21 )  PTT:25.1 sec  MEDICATIONS  (STANDING):  ascorbic acid 500 milliGRAM(s) Oral daily  chlorhexidine 0.12% Liquid 15 milliLiter(s) Oral Mucosa every 12 hours  cloNIDine 0.1 milliGRAM(s) Oral every 8 hours  collagenase Ointment 1 Application(s) Topical daily  Dakins Solution - 1/4 Strength 1 Application(s) Topical daily  dexMEDEtomidine Infusion 0.4 MICROgram(s)/kG/Hr (5.44 mL/Hr) IV Continuous <Continuous>  doxycycline hyclate Capsule 100 milliGRAM(s) Oral every 12 hours  enoxaparin Injectable 40 milliGRAM(s) SubCutaneous daily  famotidine    Tablet 20 milliGRAM(s) Oral two times a day  fluconAZOLE IVPB 400 milliGRAM(s) IV Intermittent every 24 hours  folic acid 1 milliGRAM(s) Oral daily  melatonin 5 milliGRAM(s) Oral at bedtime  multivitamin 1 Tablet(s) Oral daily  silver sulfADIAZINE 1% Cream 1 Application(s) Topical daily  thiamine 100 milliGRAM(s) Oral daily    MEDICATIONS  (PRN):  acetaminophen   Tablet .. 650 milliGRAM(s) Oral every 4 hours PRN Temp greater or equal to 38C (100.4F), Mild Pain (1 - 3)  ALPRAZolam 0.5 milliGRAM(s) Oral every 6 hours PRN Anxiety  glucagon  Injectable 1 milliGRAM(s) IntraMuscular once PRN Glucose <70 milliGRAM(s)/deciLiter  hydrOXYzine hydrochloride 50 milliGRAM(s) Oral every 4 hours PRN Anxiety  loperamide 2 milliGRAM(s) Oral every 4 hours PRN Loose stool  oxyCODONE    IR 5 milliGRAM(s) Oral every 6 hours PRN Moderate Pain (4 - 6)      Physical exam  Gen NAD  Neuro Alert  Card RRR  Pulm clear  Abd soft  Ext warm, dressings to RUE, and b/l LE C/D/I        I&O's Summary    15 Jul 2020 07:01  -  16 Jul 2020 07:00  --------------------------------------------------------  IN: 1136 mL / OUT: 1170 mL / NET: -34 mL        Assessment  39y Female  w/ PAST MEDICAL & SURGICAL HISTORY:  Smoker  Heroin abuse  H/O Raynaud's syndrome  Rheumatoid arthritis  Lupus  Gall bladder stones  H/O appendicitis  H/O tubal ligation  admitted with complaints of Patient is a 39y old  Female who presents with a chief complaint of Cellulitis with sepsis, symptomatic anemia. (16 Jul 2020 10:51)  .  39y Female h/o lupus, RA, epilepsy (last seizure 8 yrs ago), chronic pain, IVDA admitted w weakness, failure to thrive. Found to have acute anemia, NATALIA, transaminitis, metabolic lactic acidosis, severe dehydration, severe protein calorie malnutrition. And severe sepsis with septic shock secondary to MRSA bacteremia and UTI treated with a two-week course of Vancomycin, developed worsening respiratory failure and was ultimately intubated on 6/27/20.  s/p tracheostomy 7/13 currently being treated for fungemia. Called for PEG    NPO for PEG  consent from  on chart    Discussed with Cardiothoracic Team at AM rounds.

## 2020-07-16 NOTE — PROVIDER CONTACT NOTE (OTHER) - RECOMMENDATIONS
MARY Cerrato instructed by Marilyn that Dr. Johnson was out of town and to give consult to on-call [facility] (7/16 - Dr. Mcbride) MARY Cerrato instructed by Marilyn that Dr. Johnson was out of town and to give consult to on-call [facility] (7/16 - Dr. Arrieta)

## 2020-07-16 NOTE — PROGRESS NOTE ADULT - ASSESSMENT
Assessment: 38 y/o female seen by podiatry at bedside in the CCU for the followin.) Full Thickness Fibronecrotic Ulcer of Right fifth digit  2.) Full Thickness Fibronecrotic Ulcer with exposed peroneal tendon, lateral aspect RLE  3.) Full thickness ulcer with exposed Achilles tendon LT  4.) Full thickness ulcer to lateral malleolus, LLE  5) Full thickness ulceration, dorsum of left 1st MTPJ  6) Partial Thickness ulcer 4th interspace, Lt foot  7) Full thickness ulceration, upper 1/3 aspect of left leg at level of fibular notch  8) Non gradable Eschar of 3rd digit, Lt foot  9) Multiple hyperpigmented scar-like lesions scattered all over LE, bl  10) Pain in B/L Lower Extremities     Plan:  - Chart reviewed and patient evaluated by Podiatry team.  - X-rays of the b/l LEs reviewed at this time. Case discussed with radiologist in depth. Please note oval radiolucencies visualized in the b/l LEs X-rays are due to air in exposed open wounds.   - Final wound Cx. left 1st MTPJ full-thickness ulceration, reviewed  - All wounds to b/l LEs irrigated with copious amounts of sterile saline.  - Dakins soaked gauzed applied to full-thickness ulceration to right fifth digit.   - Adaptic applied over exposed peroneal tendons to RLE.   - Adaptic applied over exposed Achilles tendon of LLE.   - Silvadene applied to wounds of L 3rd digit and L lateral malleolus ulceration.  - The b/l feet and ankles were wrapped with 4x4 gauze, ABD pad and kerlix, then secured with tape.   - Patient's right and left heels to be offloaded in b/l Z-flex boots to be worn at all times when patient bedbound.  - Vascular Consult, appreciated.   - Discussed case with ID department given purulence exuded from full thickness ulceration to left 1st MPJ and that deep wound cx was taken, Abx per ID appreciated.  - Discussed with CCU doctor the case, and recommendations, appreciated.  - Discussed findings of full-thickness ulceration at level of left fibular notch with Vascular surgery and Wound care nursing departments. Local wound care of full-thickness ulceration at level of left fibular notch per Wound care nursing department in addition to care of patient's sacral ulceration, appreciated.  - No podiatric surgical intervention necessary at this time as no abscess collections found to b/l lower extremities   - At this time, podiatry will provide supportive, and noninvasive wound care to the wounds, b/l lower extremities, given the fact that the patient is deemed in non stable medically and at risk for acute decompensation.  - Podiatry will follow the case closely while in house.

## 2020-07-16 NOTE — PROGRESS NOTE ADULT - SUBJECTIVE AND OBJECTIVE BOX
Date of service: 7/16/2020  Chief Complaint: B/L lower extremity wounds     HPI: Pt was examined at bedside by podiatry with attending present, for B/L lower extremity ulcerative lesions. Pt is a 38 yo female who has been admitted to the CCU for septic shock, UTI with MRSA and bacteremia. Noted Pt is currently intubated and non verbal, she is alert and oriented. The patient is also being treated for anemia, acute respiratory failure and toxic metabolic encephalopathy. Of Note, Pt has a history of IVDA drug use and is critically ill and at risk for acute decompensation possible death per the intensivist. Due to the patient being intubated all additional medical history and information was gathered from the patients chart.    7/16: Patient seen and examined by podiatry; for follow-up evaluation of right and left lower extremity wounds. Patient noted be arousable at bedside and patient s/p tracheostomy procedure performed on 7/13/2020. Patient noted to be responsive to stimuli when her lower extremities are examined by Podiatry. Patient planned for PEG today. All further HPI cannot be obtained due to patient's current condition and obtained from patient's chart.     REVIEW OF SYSTEMS:  Unable to assess due to pt's inability to communicate per intubation    Allergies:  morphine (Unknown)    Past Medical History:  Lupus, RA, Reynard's Epilepsy, IVDA     Family History:  No pertinent family history in first degree relatives: cannot recall family history at this time    Social History:  lives alone  single  smokes 4 cig/day  uses two bags of heroin a day (24 Jun 2020 01:21)    MEDICATIONS  (STANDING):  ascorbic acid 500 milliGRAM(s) Oral daily  chlorhexidine 0.12% Liquid 15 milliLiter(s) Oral Mucosa every 12 hours  cloNIDine 0.1 milliGRAM(s) Oral every 8 hours  collagenase Ointment 1 Application(s) Topical daily  Dakins Solution - 1/4 Strength 1 Application(s) Topical daily  dexMEDEtomidine Infusion 0.4 MICROgram(s)/kG/Hr (5.44 mL/Hr) IV Continuous <Continuous>  doxycycline hyclate Capsule 100 milliGRAM(s) Oral every 12 hours  enoxaparin Injectable 40 milliGRAM(s) SubCutaneous daily  famotidine    Tablet 20 milliGRAM(s) Oral two times a day  fluconAZOLE IVPB 400 milliGRAM(s) IV Intermittent every 24 hours  folic acid 1 milliGRAM(s) Oral daily  melatonin 5 milliGRAM(s) Oral at bedtime  multivitamin 1 Tablet(s) Oral daily  silver sulfADIAZINE 1% Cream 1 Application(s) Topical daily  thiamine 100 milliGRAM(s) Oral daily    MEDICATIONS  (PRN):  acetaminophen   Tablet .. 650 milliGRAM(s) Oral every 4 hours PRN Temp greater or equal to 38C (100.4F), Mild Pain (1 - 3)  ALPRAZolam 0.5 milliGRAM(s) Oral every 6 hours PRN Anxiety  glucagon  Injectable 1 milliGRAM(s) IntraMuscular once PRN Glucose <70 milliGRAM(s)/deciLiter  hydrOXYzine hydrochloride 50 milliGRAM(s) Oral every 4 hours PRN Anxiety  loperamide 2 milliGRAM(s) Oral every 4 hours PRN Loose stool  oxyCODONE    IR 5 milliGRAM(s) Oral every 6 hours PRN Moderate Pain (4 - 6)    Vital Signs Last 24 Hrs  T(C): 36.7 (07-16-20 @ 09:51), Max: 37.4 (07-15-20 @ 21:53)  T(F): 98 (07-16-20 @ 09:51), Max: 99.4 (07-15-20 @ 21:53)  HR: 96 (07-16-20 @ 10:00) (84 - 104)  BP: 111/73 (07-16-20 @ 10:00) (101/64 - 117/81)  BP(mean): 82 (07-16-20 @ 10:00) (72 - 90)  RR: --  SpO2: 100% (07-16-20 @ 10:00) (100% - 100%)    Physical examination:  Constitutional: Pt intubated, lying in bed    Focused LE examination:   Vasc: DP and PT pulses non palpable b/l, CFT < 5 x 10, TG: warm to warm  Neuro and MSK: Unable to assess due to patient's condition    Derm:  Noted Multiple hyperpigmented scar-like lesions scattered all over LE, bl    Rt LE  - Full thickness ulcer with exposed peroneal tendons on lateral aspect of distal RLE that + probes to Fibula, no drainage, + undermining all around the clock, + tenderness. Negative crepitus or fluctuance to area of lateral aspect of the distal RLE.  - Rt 5th toe demonstrates a full thickness with less fibronecotic wound bed and evidence of increasing granulation tissue compared to the prior examination, measuring approx. 7.2 x 4.6 x 0.8 cm, - purulence, - malodor, - fluctuance +Probe to 5th metatarsal head, EDL tendon exposed and + undermining from the medial aspect, + tunneling to the 9 hour.     Lt LE:  -Full thickness ulcer round in shape and approx  0.5 cm distal to lateral malleolus, tunneling to 3 and 6 o'clock positions, + undermining all around, - drainage, - fluctuance - malodor, + probe to bone to the lateral malleolus, - purulence; negative crepitus and - fluctuance  - Multiple Full thickness ulcerations with exposed Achilles tendon noted to the posterior surface of the left distal 1/3 aspect of the left leg.  -Swelling and mild edema noted over the medial malleolus of LLE. Edema noted to the dorsal-lateral aspect of the left fifth MPJ, noted to be mostly likely pressure induced; area of black-blue discoloration noted to the the left fifth sub met 5 region.   -Partial thickness ulcer, 4th interdigital space with presence of dry blood, - probe to bone, - undermining - tunneling  - Non gradable eschar on 3rd digit on Lt foot  - Full thickness ulceration noted to the dorsum of the left 1st MPJ. Positive probing to the left 1st MPJ. Positive purulence exuded from the wound with milking of the left foot.  - Full thickness ulceration noted to the lateral aspect of the upper 1/3 aspect of the leg at the level of the fibular notch. + tendon exposed; no drainage with palpation of the periwound area; no malodor exuded from the wound, negative fluctuance and negative crepitus to the area.    LABS:           Complete Blood Count (07.16.20 @ 07:21)    WBC Count: 7.69 K/uL    RBC Count: 3.65 M/uL    Hemoglobin: 10.6 g/dL    Hematocrit: 34.9 %    Mean Cell Volume: 95.6 fl    Mean Cell Hemoglobin: 29.0 pg    Mean Cell Hemoglobin Conc: 30.4 gm/dL    Red Cell Distrib Width: 16.1 %    Platelet Count - Automated: 435 K/uL    Comprehensive Metabolic Panel (07.16.20 @ 07:21)    Sodium, Serum: 138 mmol/L    Potassium, Serum: 4.6 mmol/L    Chloride, Serum: 109 mmol/L    Carbon Dioxide, Serum: 23 mmol/L    Anion Gap, Serum: 6 mmol/L    Blood Urea Nitrogen, Serum: 38 mg/dL    Creatinine, Serum: 1.03 mg/dL    Glucose, Serum: 78 mg/dL    Calcium, Total Serum: 8.2 mg/dL    Protein Total, Serum: 7.6 gm/dL    Albumin, Serum: 1.4 g/dL    Bilirubin Total, Serum: 0.3 mg/dL    Alkaline Phosphatase, Serum: 201 U/L    Aspartate Aminotransferase (AST/SGOT): 46 U/L    Alanine Aminotransferase (ALT/SGPT): 25 U/L    eGFR if Non : 69: Interpretative comment  The units for eGFR are mL/min/1.73M2 (normalized body surface area). The  eGFR is calculated from a serum creatinine using the CKD-EPI equation.  Other variables required for calculation are race, age and sex. Among  patients with chronic kidney disease (CKD), the eGFR is useful in  determining the stage of disease according to KDOQI CKD classification.  All eGFR results are reported numerically with the following  interpretation.          GFR                    With                 Without     (ml/min/1.73 m2)    Kidney Damage       Kidney Damage        >= 90                    Stage 1                     Normal        60-89                    Stage 2                     Decreased GFR        30-59     Stage 3                     Stage 3        15-29                    Stage 4                     Stage 4        < 15                      Stage 5                     Stage 5  Each stage of CKD assumes that the associated GFR level has been in  effect for at least 3 months. Determination of stages one and two (with  eGFR > 59 ml/min/m2) requires estimation of kidney damage for at least 3  months as defined by structural or functional abnormalities.  Limitations: All estimates of GFR will be less accurate for patients at  extremes of muscle mass (including but not limited to frail elderly,  critically ill, or cancer patients), those with unusual diets, and those  with conditions associated with reduced secretion or extrarenal  elimination of creatinine. The eGFR equation is not recommended for use  in patients with unstable creatinine levels. mL/min/1.73M2    eGFR if African American: 80 mL/min/1.73M2      Culture - Other (07.13.20 @ 09:30)    Specimen Source: .Other L 1st MPJ deep wound cx    Culture Results:   Rare Staphylococcus aureus      Blood Culture:   07-06 @ 14:30  Organism Blood Culture PCR  Gram Stain Blood -- Gram Stain   Growth in aerobic bottle: Yeast like cells  Specimen Source .Blood Blood-Peripheral  Culture-Blood --    Culture - Blood in AM (07.06.20 @ 14:30)    -  Methicillin resistant Staphylococcus aureus (MRSA): Nondet    -  Coagulase negative Staphylococcus: Nondet    -  Enterococcus species: Nondet    -  Vancomycin resistant Enterococcus sp.: Nondet    -  Escherichia coli: Nondet    -  Klebsiella oxytoca: Nondet    -  Klebsiella pneumoniae: Nondet    -  Serratia marcescens: Nondet    -  Haemophilus influenzae: Nondet    -  Listeria monocytogenes: Nondet    -  Neisseria meningitidis: Nondet    -  Pseudomonas aeruginosa: Nondet    -  Acinetobacter baumanii: Nondet    -  Enterobacter cloacae complex: Nondet    -  Streptococcus sp. (Not Grp A, B or S pneumoniae): Nondet    -  Streptococcus agalactiae (Group B): Nondet    -  Streptococcus pyogenes (Group A): Nondet    -  Streptococcus pneumoniae: Nondet    -  Candida albicans: Nondet    -  Candida glabrata: Nondet    -  Candida krusei: Nondet    -  Candida parapsilosis: Nondet    -  Candida tropicalis: Nondet    -  Multidrug (KPC pos) resistant organism: Nondet    -  Staphylococcus aureus: Nondet    Gram Stain:   Growth in aerobic bottle: Yeast like cells    Specimen Source: .Blood Blood-Peripheral    Organism: Blood Culture PCR    Culture Results:   Growth in aerobic bottle: Brittny dubliniensis  "Due to technical problems, Proteus sp. will Not be reported as part of  the BCID panel until further notice"  ***Blood Panel PCR results on this specimen are available  approximately 3 hours after the Gram stain result.***  Gram stain, PCR, and/or culture results may not always  correspond due to difference in methodologies.      Imaging:    < from: US Duplex Arterial Lower Ext Compl, Bilateral (07.14.20 @ 14:49) >  IMPRESSION:  No evidence of a hemodynamically significant stenosis or occlusion in the visualized lower extremity arteries.  Subcutaneous edema throughout both lower extremities with fluid collections in both groins.  Monophasic waveforms throughout both lower extremities.  < end of copied text >      < from: TTE Echo Complete w/o Contrast w/ Doppler (06.27.20 @ 08:58) >   Impression   Summary   The mid to apical anterolateral to anterior segments are severely   hypokinetic. The apical inferoseptal wall appears hypokinetic.   Estimated left ventricular ejection fraction is 45-50 %.   A catheter wire is seen in the right atrium.   Mild (1+) tricuspid valve regurgitation is present.   Mild pulmonary hypertension.  < end of copied text >      < from: Xray Ankle Complete 3 Views, Bilateral (07.09.20 @ 16:42) >  EXAM:  XR ANKLE COMP MIN 3 VIEWS BI                        *** ADDENDUM 07/10/2020  ***                                                           Addendum:    The posterior soft tissue gas of the left ankle may be related to skin ulcerationor early fistulous tract formation. Clinical correlation is suggested.  *** END OF ADDENDUM 07/10/2020  ***  PROCEDURE DATE:  07/09/2020    INTERPRETATION:  Bilateral ankles  HISTORY: Bilateral ulcers    Three views of the right ankle and three views of the left ankle show no evidence of fracture nor destructive change. The joint spaces are maintained.  Subcutaneous soft tissue gas is present behind the left ankle.  IMPRESSION: Soft tissue gas as noted.  Thank you for this referral  ***Please see the addendum at the top of this report. It may contain additional important information or changes.****  < end of copied text >

## 2020-07-17 PROCEDURE — 99233 SBSQ HOSP IP/OBS HIGH 50: CPT

## 2020-07-17 PROCEDURE — 99232 SBSQ HOSP IP/OBS MODERATE 35: CPT

## 2020-07-17 RX ADMIN — CHLORHEXIDINE GLUCONATE 15 MILLILITER(S): 213 SOLUTION TOPICAL at 21:11

## 2020-07-17 RX ADMIN — Medication 500 MILLIGRAM(S): at 09:41

## 2020-07-17 RX ADMIN — ENOXAPARIN SODIUM 40 MILLIGRAM(S): 100 INJECTION SUBCUTANEOUS at 09:40

## 2020-07-17 RX ADMIN — Medication 1 APPLICATION(S): at 12:15

## 2020-07-17 RX ADMIN — Medication 100 MILLIGRAM(S): at 09:44

## 2020-07-17 RX ADMIN — Medication 0.1 MILLIGRAM(S): at 05:15

## 2020-07-17 RX ADMIN — Medication 0.5 MILLIGRAM(S): at 05:15

## 2020-07-17 RX ADMIN — Medication 1 MILLIGRAM(S): at 09:41

## 2020-07-17 RX ADMIN — Medication 50 MILLIGRAM(S): at 22:25

## 2020-07-17 RX ADMIN — Medication 0.1 MILLIGRAM(S): at 21:11

## 2020-07-17 RX ADMIN — Medication 1 TABLET(S): at 09:41

## 2020-07-17 RX ADMIN — Medication 50 MILLIGRAM(S): at 05:15

## 2020-07-17 RX ADMIN — Medication 100 MILLIGRAM(S): at 09:40

## 2020-07-17 RX ADMIN — CHLORHEXIDINE GLUCONATE 15 MILLILITER(S): 213 SOLUTION TOPICAL at 09:42

## 2020-07-17 RX ADMIN — Medication 1 APPLICATION(S): at 15:20

## 2020-07-17 RX ADMIN — Medication 5 MILLIGRAM(S): at 21:11

## 2020-07-17 RX ADMIN — Medication 0.5 MILLIGRAM(S): at 21:11

## 2020-07-17 RX ADMIN — Medication 100 MILLIGRAM(S): at 21:11

## 2020-07-17 NOTE — PROGRESS NOTE ADULT - ASSESSMENT
38 yo female pmhx IVDA, lupus, RA, epilepsy (last seizure 8 years ago), recent use of snorting heroin (2 bags daily) presented with FTF admitted with anemia, NATALIA, transaminitis, metabolic lactic acidosis, severe dehydration/malnutrition, respiratory failure requiring intubation now s/p trach (7/13), septic shock 2/2 MRSA bacteremia and UTI now fungemic.     s/p PEG yesterday  switched to Trach collar today 40%  ID following  Podiatry following      PLAN:  - switch to Tcollar, as tolerated, chest PT  - Celulitis, MRSA bacteremia, cont with Doxy, Fluconazole, follow up ID  - BP control  - monitor electrolytes  - start feeds via PEG  - Pain control, Dilaudid, Precedex  - DVT ppx  - Obtain todays labs  - Monitor in CCU

## 2020-07-17 NOTE — PROGRESS NOTE ADULT - SUBJECTIVE AND OBJECTIVE BOX
40 yo f pmhx IVDA, lupus, RA, epilepsy (last seizure 8 years ago), recent use of snorting heroin (2 bags daily) presented with FTF admitted with anemia, NATALIA, transaminitis, metabolic lactic acidosis, severe dehydration/malnutrition, respiratory failure requiring intubation now s/p trach (7/13), septic shock 2/2 MRSA bacteremia and UTI now fungemic.     Patient currently +trachoestomy, s/p PEG placement yesterday      T(C): 36.4 (07-17-20 @ 04:57), Max: 36.7 (07-16-20 @ 12:44)  HR: 93 (07-17-20 @ 09:00) (84 - 133)  BP: 112/81 (07-17-20 @ 09:00) (94/64 - 133/88)  RR: --  SpO2: 100% (07-17-20 @ 09:00) (97% - 100%)  Wt(kg): --        Gen: Awake, NAD  HEENT: NCAT, EOMI  Neck: Supple, +trach  CV: nml S1S2, RRR  Lungs: CTABL  Abd: Soft, NT, ND, BS+, +PEG  Ext: +edema, b/l LE wrapped with dressings.  RUE wrapped with dressings as well. Neuro: Non focal   Skin: warm, wounds in dressings

## 2020-07-17 NOTE — PROGRESS NOTE ADULT - SUBJECTIVE AND OBJECTIVE BOX
Date of service: 7/17/2020  Chief Complaint: B/L lower extremity wounds     HPI: Pt was examined at bedside by podiatry with attending present, for B/L lower extremity ulcerative lesions. Pt is a 40 yo female who has been admitted to the CCU for septic shock, UTI with MRSA and bacteremia. Noted Pt is currently intubated and non verbal, she is alert and oriented. The patient is also being treated for anemia, acute respiratory failure and toxic metabolic encephalopathy. Of Note, Pt has a history of IVDA drug use and is critically ill and at risk for acute decompensation possible death per the intensivist. Due to the patient being intubated all additional medical history and information was gathered from the patients chart.    7/17: Patient seen and examined by podiatry for follow-up evaluation of right and left lower extremity wounds. Patient noted be arousable at bedside and patient s/p tracheostomy procedure performed on 7/13/2020 and PEG placed on 7/16/2020. Patient noted to be responsive to stimuli when her lower extremities are examined by Podiatry.  All further HPI cannot be obtained due to patient's current condition and obtained from patient's chart.     REVIEW OF SYSTEMS:  Unable to assess due to pt's inability to communicate per intubation    Allergies:  morphine (Unknown)    Past Medical History:  Lupus, RA, Reynard's Epilepsy, IVDA     Family History:  No pertinent family history in first degree relatives: cannot recall family history at this time    Social History:  lives alone  single  smokes 4 cig/day  uses two bags of heroin a day (24 Jun 2020 01:21)    LABS:  Complete Blood Count (07.16.20 @ 07:21)    WBC Count: 7.69 K/uL    RBC Count: 3.65 M/uL    Hemoglobin: 10.6 g/dL    Hematocrit: 34.9 %    Mean Cell Volume: 95.6 fl    Mean Cell Hemoglobin: 29.0 pg    Mean Cell Hemoglobin Conc: 30.4 gm/dL    Red Cell Distrib Width: 16.1 %    Platelet Count - Automated: 435 K/uL    Comprehensive Metabolic Panel (07.16.20 @ 07:21)    Sodium, Serum: 138 mmol/L    Potassium, Serum: 4.6 mmol/L    Chloride, Serum: 109 mmol/L    Carbon Dioxide, Serum: 23 mmol/L    Anion Gap, Serum: 6 mmol/L    Blood Urea Nitrogen, Serum: 38 mg/dL    Creatinine, Serum: 1.03 mg/dL    Glucose, Serum: 78 mg/dL    Calcium, Total Serum: 8.2 mg/dL    Protein Total, Serum: 7.6 gm/dL    Albumin, Serum: 1.4 g/dL    Bilirubin Total, Serum: 0.3 mg/dL    Alkaline Phosphatase, Serum: 201 U/L    Aspartate Aminotransferase (AST/SGOT): 46 U/L    Alanine Aminotransferase (ALT/SGPT): 25 U/L    eGFR if Non : 69: Interpretative comment  The units for eGFR are mL/min/1.73M2 (normalized body surface area). The  eGFR is calculated from a serum creatinine using the CKD-EPI equation.  Other variables required for calculation are race, age and sex. Among  patients with chronic kidney disease (CKD), the eGFR is useful in  determining the stage of disease according to KDOQI CKD classification.  All eGFR results are reported numerically with the following  interpretation.          GFR                    With                 Without     (ml/min/1.73 m2)    Kidney Damage       Kidney Damage        >= 90                    Stage 1                     Normal        60-89                    Stage 2                     Decreased GFR        30-59     Stage 3                     Stage 3        15-29                    Stage 4                     Stage 4        < 15                      Stage 5                     Stage 5  Each stage of CKD assumes that the associated GFR level has been in  effect for at least 3 months. Determination of stages one and two (with  eGFR > 59 ml/min/m2) requires estimation of kidney damage for at least 3  months as defined by structural or functional abnormalities.  Limitations: All estimates of GFR will be less accurate for patients at  extremes of muscle mass (including but not limited to frail elderly,  critically ill, or cancer patients), those with unusual diets, and those  with conditions associated with reduced secretion or extrarenal  elimination of creatinine. The eGFR equation is not recommended for use  in patients with unstable creatinine levels. mL/min/1.73M2    eGFR if African American: 80 mL/min/1.73M2    .Other L 1st MPJ deep wound cx  07-13 @ 09:30   Rare Methicillin resistant Staphylococcus aureus  --  Methicillin resistant Staphylococcus aureus    .Blood Blood-Peripheral  07-06 @ 14:30   Growth in aerobic bottle: Brittny dubliniensis  "Due to technical problems, Proteus sp. will Not be reported as part of  the BCID panel until further notice"  ***Blood Panel PCR results on this specimen are available  approximately 3 hours after the Gram stain result.***  Gram stain, PCR, and/or culture results may not always  correspond due to difference in methodologies.  ************************************************************  This PCR assay was performed using [employer].  The following targets are tested for: Enterococcus,  vancomycin resistant enterococci, Listeria monocytogenes,  coagulase negative staphylococci, S. aureus,  methicillin resistant S. aureus, Streptococcus agalactiae  (Group B), S. pneumoniae, S. pyogenes (Group A),  Acinetobacter baumannii, Enterobacter cloacae, E. coli,  Klebsiella oxytoca, K. pneumoniae, Proteus sp.,  Serratia marcescens, Haemophilus influenzae,  Neisseria meningitidis, Pseudomonas aeruginosa, Candida  albicans, C. glabrata, C krusei, C parapsilosis,  C. tropicalis and the KPC resistance gene.  --  Blood Culture PCR  Brittny dubliniensis      .Sputum Sputum trap  06-29 @ 08:29   Normal Respiratory Elsa present  --    No Squamous epithelial cells per low power field  Moderate polymorphonuclear leukocytes per low power field  Moderate Yeast per oil power field    .Urine None  06-24 @ 05:41   >100,000 CFU/ml Methicillin resistant Staphylococcus aureus  <10,000 CFU/ml Normal Urogenital elsa present  --  Methicillin resistant Staphylococcus aureus    .Blood None  06-23 @ 22:22   Growth in aerobic and anaerobic bottles: Methicillin resistant  Staphylococcus aureus  See previous culture [id number]  --  Blood Culture PCR  Methicillin resistant Staphylococcus aureus      Vital Signs Last 24 Hrs  T(C): 36.4 (17 Jul 2020 04:57), Max: 36.7 (16 Jul 2020 12:44)  T(F): 97.6 (17 Jul 2020 04:57), Max: 98 (16 Jul 2020 12:44)  HR: 93 (17 Jul 2020 09:00) (84 - 133)  BP: 112/81 (17 Jul 2020 09:00) (94/64 - 133/88)  BP(mean): 88 (17 Jul 2020 09:00) (70 - 99)  RR: --  SpO2: 100% (17 Jul 2020 09:00) (97% - 100%)    Physical examination:  Constitutional: Pt intubated, lying in bed    Focused LE examination:   Vasc: DP and PT pulses non palpable b/l, CFT < 5 x 10, TG: warm to warm  Neuro and MSK: Unable to assess due to patient's condition    Derm:  Noted Multiple hyperpigmented scar-like lesions scattered all over LE, bl    Rt LE  - Full thickness ulcer with exposed peroneal tendons on lateral aspect of distal RLE that + probes to Fibula, no drainage, + undermining all around the clock, + tenderness. Negative crepitus or fluctuance to area of lateral aspect of the distal RLE.  - Rt 5th toe demonstrates a full thickness with less fibronecotic wound bed and evidence of increasing granulation tissue compared to the prior examination, measuring approx. 7.2 x 4.6 x 0.8 cm, - purulence, - malodor, - fluctuance +Probe to 5th metatarsal head, EDL tendon exposed and + undermining from the medial aspect, + tunneling to the 9 hour.     Lt LE:  -Full thickness ulcer round in shape and approx  0.5 cm distal to lateral malleolus, tunneling to 3 and 6 o'clock positions, + undermining all around, - drainage, - fluctuance - malodor, + probe to bone to the lateral malleolus, - purulence; negative crepitus and - fluctuance  - Multiple Full thickness ulcerations with exposed Achilles tendon noted to the posterior surface of the left distal 1/3 aspect of the left leg.  -Swelling and mild edema noted over the medial malleolus of LLE. Intact serous blister noted to the medial malleolar region. Edema noted to the dorsal-lateral aspect of the left fifth MPJ, noted to be mostly likely pressure induced; area of black-blue discoloration noted to the the left fifth sub met 5 region.   -Partial thickness ulcer, 4th interdigital space with presence of dry blood, - probe to bone, - undermining - tunneling  - Non gradable eschar on 3rd digit on Lt foot  - Full thickness ulceration noted to the dorsum of the left 1st MPJ. Positive probing to the left 1st MPJ. Positive purulence exuded from the wound with milking of the left foot.  - Full thickness ulceration noted to the lateral aspect of the upper 1/3 aspect of the leg at the level of the fibular notch. + tendon exposed; no drainage with palpation of the periwound area; no malodor exuded from the wound, negative fluctuance and negative crepitus to the area.        Imaging:    < from: US Duplex Arterial Lower Ext Compl, Bilateral (07.14.20 @ 14:49) >  IMPRESSION:  No evidence of a hemodynamically significant stenosis or occlusion in the visualized lower extremity arteries.  Subcutaneous edema throughout both lower extremities with fluid collections in both groins.  Monophasic waveforms throughout both lower extremities.  < end of copied text >      < from: TTE Echo Complete w/o Contrast w/ Doppler (06.27.20 @ 08:58) >   Impression   Summary   The mid to apical anterolateral to anterior segments are severely   hypokinetic. The apical inferoseptal wall appears hypokinetic.   Estimated left ventricular ejection fraction is 45-50 %.   A catheter wire is seen in the right atrium.   Mild (1+) tricuspid valve regurgitation is present.   Mild pulmonary hypertension.  < end of copied text >      < from: Xray Ankle Complete 3 Views, Bilateral (07.09.20 @ 16:42) >  EXAM:  XR ANKLE COMP MIN 3 VIEWS BI                        *** ADDENDUM 07/10/2020  ***                                                           Addendum:    The posterior soft tissue gas of the left ankle may be related to skin ulcerationor early fistulous tract formation. Clinical correlation is suggested.  *** END OF ADDENDUM 07/10/2020  ***  PROCEDURE DATE:  07/09/2020    INTERPRETATION:  Bilateral ankles  HISTORY: Bilateral ulcers    Three views of the right ankle and three views of the left ankle show no evidence of fracture nor destructive change. The joint spaces are maintained.  Subcutaneous soft tissue gas is present behind the left ankle.  IMPRESSION: Soft tissue gas as noted.  Thank you for this referral  ***Please see the addendum at the top of this report. It may contain additional important information or changes.****  < end of copied text > Date of service: 7/17/2020  Chief Complaint: B/L lower extremity wounds     HPI: Pt was examined at bedside by podiatry with attending present, for B/L lower extremity ulcerative lesions. Pt is a 40 yo female who has been admitted to the CCU for septic shock, UTI with MRSA and bacteremia. Noted Pt is currently intubated and non verbal, she is alert and oriented. The patient is also being treated for anemia, acute respiratory failure and toxic metabolic encephalopathy. Of Note, Pt has a history of IVDA drug use and is critically ill and at risk for acute decompensation possible death per the intensivist. Due to the patient being intubated all additional medical history and information was gathered from the patients chart.    7/17: Patient seen and examined by podiatry for follow-up evaluation of right and left lower extremity wounds. Patient noted be arousable at bedside and patient s/p tracheostomy procedure performed on 7/13/2020 and PEG placed on 7/16/2020. Patient noted to be responsive to stimuli when her lower extremities are examined by Podiatry.  All further HPI cannot be obtained due to patient's current condition and obtained from patient's chart.     REVIEW OF SYSTEMS:  Unable to assess due to pt's inability to communicate per intubation    Allergies:  morphine (Unknown)    Past Medical History:  Lupus, RA, Reynard's Epilepsy, IVDA     Family History:  No pertinent family history in first degree relatives: cannot recall family history at this time    Social History:  lives alone  single  smokes 4 cig/day  uses two bags of heroin a day (24 Jun 2020 01:21)    LABS:  Complete Blood Count (07.16.20 @ 07:21)    WBC Count: 7.69 K/uL    RBC Count: 3.65 M/uL    Hemoglobin: 10.6 g/dL    Hematocrit: 34.9 %    Mean Cell Volume: 95.6 fl    Mean Cell Hemoglobin: 29.0 pg    Mean Cell Hemoglobin Conc: 30.4 gm/dL    Red Cell Distrib Width: 16.1 %    Platelet Count - Automated: 435 K/uL    Comprehensive Metabolic Panel (07.16.20 @ 07:21)    Sodium, Serum: 138 mmol/L    Potassium, Serum: 4.6 mmol/L    Chloride, Serum: 109 mmol/L    Carbon Dioxide, Serum: 23 mmol/L    Anion Gap, Serum: 6 mmol/L    Blood Urea Nitrogen, Serum: 38 mg/dL    Creatinine, Serum: 1.03 mg/dL    Glucose, Serum: 78 mg/dL    Calcium, Total Serum: 8.2 mg/dL    Protein Total, Serum: 7.6 gm/dL    Albumin, Serum: 1.4 g/dL    Bilirubin Total, Serum: 0.3 mg/dL    Alkaline Phosphatase, Serum: 201 U/L    Aspartate Aminotransferase (AST/SGOT): 46 U/L    Alanine Aminotransferase (ALT/SGPT): 25 U/L    eGFR if Non : 69: Interpretative comment  The units for eGFR are mL/min/1.73M2 (normalized body surface area). The  eGFR is calculated from a serum creatinine using the CKD-EPI equation.  Other variables required for calculation are race, age and sex. Among  patients with chronic kidney disease (CKD), the eGFR is useful in  determining the stage of disease according to KDOQI CKD classification.  All eGFR results are reported numerically with the following  interpretation.          GFR                    With                 Without     (ml/min/1.73 m2)    Kidney Damage       Kidney Damage        >= 90                    Stage 1                     Normal        60-89                    Stage 2                     Decreased GFR        30-59     Stage 3                     Stage 3        15-29                    Stage 4                     Stage 4        < 15                      Stage 5                     Stage 5  Each stage of CKD assumes that the associated GFR level has been in  effect for at least 3 months. Determination of stages one and two (with  eGFR > 59 ml/min/m2) requires estimation of kidney damage for at least 3  months as defined by structural or functional abnormalities.  Limitations: All estimates of GFR will be less accurate for patients at  extremes of muscle mass (including but not limited to frail elderly,  critically ill, or cancer patients), those with unusual diets, and those  with conditions associated with reduced secretion or extrarenal  elimination of creatinine. The eGFR equation is not recommended for use  in patients with unstable creatinine levels. mL/min/1.73M2    eGFR if African American: 80 mL/min/1.73M2    .Other L 1st MPJ deep wound cx  07-13 @ 09:30   Rare Methicillin resistant Staphylococcus aureus  --  Methicillin resistant Staphylococcus aureus    .Blood Blood-Peripheral  07-06 @ 14:30   Growth in aerobic bottle: Brittny dubliniensis  "Due to technical problems, Proteus sp. will Not be reported as part of  the BCID panel until further notice"  ***Blood Panel PCR results on this specimen are available  approximately 3 hours after the Gram stain result.***  Gram stain, PCR, and/or culture results may not always  correspond due to difference in methodologies.  ************************************************************  This PCR assay was performed using [employer].  The following targets are tested for: Enterococcus,  vancomycin resistant enterococci, Listeria monocytogenes,  coagulase negative staphylococci, S. aureus,  methicillin resistant S. aureus, Streptococcus agalactiae  (Group B), S. pneumoniae, S. pyogenes (Group A),  Acinetobacter baumannii, Enterobacter cloacae, E. coli,  Klebsiella oxytoca, K. pneumoniae, Proteus sp.,  Serratia marcescens, Haemophilus influenzae,  Neisseria meningitidis, Pseudomonas aeruginosa, Candida  albicans, C. glabrata, C krusei, C parapsilosis,  C. tropicalis and the KPC resistance gene.  --  Blood Culture PCR  Brittny dubliniensis      .Sputum Sputum trap  06-29 @ 08:29   Normal Respiratory Elsa present  --    No Squamous epithelial cells per low power field  Moderate polymorphonuclear leukocytes per low power field  Moderate Yeast per oil power field    .Urine None  06-24 @ 05:41   >100,000 CFU/ml Methicillin resistant Staphylococcus aureus  <10,000 CFU/ml Normal Urogenital elsa present  --  Methicillin resistant Staphylococcus aureus    .Blood None  06-23 @ 22:22   Growth in aerobic and anaerobic bottles: Methicillin resistant  Staphylococcus aureus  See previous culture [id number]  --  Blood Culture PCR  Methicillin resistant Staphylococcus aureus      Vital Signs Last 24 Hrs  T(C): 36.4 (17 Jul 2020 04:57), Max: 36.7 (16 Jul 2020 12:44)  T(F): 97.6 (17 Jul 2020 04:57), Max: 98 (16 Jul 2020 12:44)  HR: 93 (17 Jul 2020 09:00) (84 - 133)  BP: 112/81 (17 Jul 2020 09:00) (94/64 - 133/88)  BP(mean): 88 (17 Jul 2020 09:00) (70 - 99)  RR: --  SpO2: 100% (17 Jul 2020 09:00) (97% - 100%)    Physical examination:  Constitutional: Pt intubated, lying in bed    Focused LE examination:   Vasc: DP and PT pulses non palpable b/l, CFT < 5 x 10, TG: warm to warm  Neuro and MSK: Unable to assess due to patient's condition    Derm:  Noted Multiple hyperpigmented scar-like lesions scattered all over LE, bl    Rt LE  - Full thickness ulcer with exposed peroneal tendons on lateral aspect of distal RLE that + probes to Fibula, no drainage, + undermining all around the clock, + tenderness. Negative crepitus or fluctuance to area of lateral aspect of the distal RLE.  - Rt 5th toe demonstrates a full thickness with less fibronecotic wound bed and evidence of increasing granulation tissue compared to the prior examination, measuring approx. 7.2 x 4.6 x 0.8 cm, - purulence, - malodor, - fluctuance +Probe to 5th metatarsal head, EDL tendon exposed and + undermining from the medial aspect, + tunneling to the 9 hour.     Lt LE:  -Full thickness ulcer round in shape and approx  0.5 cm distal to lateral malleolus, tunneling to 3 and 6 o'clock positions, + undermining all around, - drainage, - fluctuance - malodor, + probe to bone to the lateral malleolus, - purulence; negative crepitus and - fluctuance  - Multiple Full thickness ulcerations with exposed Achilles tendon noted to the posterior surface of the left distal 1/3 aspect of the left leg.  -Swelling and mild edema noted over the medial malleolus of LLE. Intact serous blister noted to the medial malleolar region. Edema noted to the dorsal-lateral aspect of the left fifth MPJ, noted to be mostly likely pressure induced; area of black-blue discoloration noted to the the left fifth sub met 5 region.   -Partial thickness ulcer, 4th interdigital space with presence of dry blood, - probe to bone, - undermining - tunneling  - Non gradable eschar on 3rd digit on Lt foot  - Full thickness ulceration noted to the dorsum of the left 1st MPJ. Positive probing to the left 1st MPJ. Positive purulence exuded from the wound with milking of the left foot.  - Full thickness ulceration noted to the lateral aspect of the upper 1/3 aspect of the leg at the level of the fibular notch. + tendon exposed; no drainage with palpation of the periwound area; no malodor exuded from the wound, negative fluctuance and negative crepitus to the area. Full thickness ulceration L fibular notch area noted to be covered with Alleyvn pad.     Imaging:    < from: US Duplex Arterial Lower Ext Compl, Bilateral (07.14.20 @ 14:49) >  IMPRESSION:  No evidence of a hemodynamically significant stenosis or occlusion in the visualized lower extremity arteries.  Subcutaneous edema throughout both lower extremities with fluid collections in both groins.  Monophasic waveforms throughout both lower extremities.  < end of copied text >      < from: TTE Echo Complete w/o Contrast w/ Doppler (06.27.20 @ 08:58) >   Impression   Summary   The mid to apical anterolateral to anterior segments are severely   hypokinetic. The apical inferoseptal wall appears hypokinetic.   Estimated left ventricular ejection fraction is 45-50 %.   A catheter wire is seen in the right atrium.   Mild (1+) tricuspid valve regurgitation is present.   Mild pulmonary hypertension.  < end of copied text >      < from: Xray Ankle Complete 3 Views, Bilateral (07.09.20 @ 16:42) >  EXAM:  XR ANKLE COMP MIN 3 VIEWS BI                        *** ADDENDUM 07/10/2020  ***                                                           Addendum:    The posterior soft tissue gas of the left ankle may be related to skin ulcerationor early fistulous tract formation. Clinical correlation is suggested.  *** END OF ADDENDUM 07/10/2020  ***  PROCEDURE DATE:  07/09/2020    INTERPRETATION:  Bilateral ankles  HISTORY: Bilateral ulcers    Three views of the right ankle and three views of the left ankle show no evidence of fracture nor destructive change. The joint spaces are maintained.  Subcutaneous soft tissue gas is present behind the left ankle.  IMPRESSION: Soft tissue gas as noted.  Thank you for this referral  ***Please see the addendum at the top of this report. It may contain additional important information or changes.****  < end of copied text > Date of service: 7/17/2020  Chief Complaint: B/L lower extremity wounds     HPI: Pt was examined at bedside by podiatry with attending present, for B/L lower extremity ulcerative lesions. Pt is a 40 yo female who has been admitted to the CCU for septic shock, UTI with MRSA and bacteremia. Noted Pt is currently intubated and non verbal, she is alert and oriented. The patient is also being treated for anemia, acute respiratory failure and toxic metabolic encephalopathy. Of Note, Pt has a history of IVDA drug use and is critically ill and at risk for acute decompensation possible death per the intensivist. Due to the patient being intubated all additional medical history and information was gathered from the patients chart.    7/17: Patient seen and examined by podiatry for follow-up evaluation of right and left lower extremity wounds with attending present. Patient noted be arousable at bedside and patient s/p tracheostomy procedure performed on 7/13/2020 and PEG placed on 7/16/2020. Patient noted to be responsive to stimuli when her lower extremities are examined by Podiatry.  All further HPI cannot be obtained due to patient's current condition and obtained from patient's chart.     REVIEW OF SYSTEMS:  Unable to assess due to pt's inability to communicate per intubation    Allergies:  morphine (Unknown)    Past Medical History:  Lupus, RA, Reynard's Epilepsy, IVDA     Family History:  No pertinent family history in first degree relatives: cannot recall family history at this time    Social History:  lives alone  single  smokes 4 cig/day  uses two bags of heroin a day (24 Jun 2020 01:21)    LABS:  Complete Blood Count (07.16.20 @ 07:21)    WBC Count: 7.69 K/uL    RBC Count: 3.65 M/uL    Hemoglobin: 10.6 g/dL    Hematocrit: 34.9 %    Mean Cell Volume: 95.6 fl    Mean Cell Hemoglobin: 29.0 pg    Mean Cell Hemoglobin Conc: 30.4 gm/dL    Red Cell Distrib Width: 16.1 %    Platelet Count - Automated: 435 K/uL    Comprehensive Metabolic Panel (07.16.20 @ 07:21)    Sodium, Serum: 138 mmol/L    Potassium, Serum: 4.6 mmol/L    Chloride, Serum: 109 mmol/L    Carbon Dioxide, Serum: 23 mmol/L    Anion Gap, Serum: 6 mmol/L    Blood Urea Nitrogen, Serum: 38 mg/dL    Creatinine, Serum: 1.03 mg/dL    Glucose, Serum: 78 mg/dL    Calcium, Total Serum: 8.2 mg/dL    Protein Total, Serum: 7.6 gm/dL    Albumin, Serum: 1.4 g/dL    Bilirubin Total, Serum: 0.3 mg/dL    Alkaline Phosphatase, Serum: 201 U/L    Aspartate Aminotransferase (AST/SGOT): 46 U/L    Alanine Aminotransferase (ALT/SGPT): 25 U/L    eGFR if Non : 69: Interpretative comment  The units for eGFR are mL/min/1.73M2 (normalized body surface area). The  eGFR is calculated from a serum creatinine using the CKD-EPI equation.  Other variables required for calculation are race, age and sex. Among  patients with chronic kidney disease (CKD), the eGFR is useful in  determining the stage of disease according to KDOQI CKD classification.  All eGFR results are reported numerically with the following  interpretation.          GFR                    With                 Without     (ml/min/1.73 m2)    Kidney Damage       Kidney Damage        >= 90                    Stage 1                     Normal        60-89                    Stage 2                     Decreased GFR        30-59     Stage 3                     Stage 3        15-29                    Stage 4                     Stage 4        < 15                      Stage 5                     Stage 5  Each stage of CKD assumes that the associated GFR level has been in  effect for at least 3 months. Determination of stages one and two (with  eGFR > 59 ml/min/m2) requires estimation of kidney damage for at least 3  months as defined by structural or functional abnormalities.  Limitations: All estimates of GFR will be less accurate for patients at  extremes of muscle mass (including but not limited to frail elderly,  critically ill, or cancer patients), those with unusual diets, and those  with conditions associated with reduced secretion or extrarenal  elimination of creatinine. The eGFR equation is not recommended for use  in patients with unstable creatinine levels. mL/min/1.73M2    eGFR if African American: 80 mL/min/1.73M2    .Other L 1st MPJ deep wound cx  07-13 @ 09:30   Rare Methicillin resistant Staphylococcus aureus  --  Methicillin resistant Staphylococcus aureus    .Blood Blood-Peripheral  07-06 @ 14:30   Growth in aerobic bottle: Brittny dubliniensis  "Due to technical problems, Proteus sp. will Not be reported as part of  the BCID panel until further notice"  ***Blood Panel PCR results on this specimen are available  approximately 3 hours after the Gram stain result.***  Gram stain, PCR, and/or culture results may not always  correspond due to difference in methodologies.  ************************************************************  This PCR assay was performed using [employer].  The following targets are tested for: Enterococcus,  vancomycin resistant enterococci, Listeria monocytogenes,  coagulase negative staphylococci, S. aureus,  methicillin resistant S. aureus, Streptococcus agalactiae  (Group B), S. pneumoniae, S. pyogenes (Group A),  Acinetobacter baumannii, Enterobacter cloacae, E. coli,  Klebsiella oxytoca, K. pneumoniae, Proteus sp.,  Serratia marcescens, Haemophilus influenzae,  Neisseria meningitidis, Pseudomonas aeruginosa, Candida  albicans, C. glabrata, C krusei, C parapsilosis,  C. tropicalis and the KPC resistance gene.  --  Blood Culture PCR  Brittny dubliniensis      .Sputum Sputum trap  06-29 @ 08:29   Normal Respiratory Elsa present  --    No Squamous epithelial cells per low power field  Moderate polymorphonuclear leukocytes per low power field  Moderate Yeast per oil power field    .Urine None  06-24 @ 05:41   >100,000 CFU/ml Methicillin resistant Staphylococcus aureus  <10,000 CFU/ml Normal Urogenital elsa present  --  Methicillin resistant Staphylococcus aureus    .Blood None  06-23 @ 22:22   Growth in aerobic and anaerobic bottles: Methicillin resistant  Staphylococcus aureus  See previous culture [id number]  --  Blood Culture PCR  Methicillin resistant Staphylococcus aureus      Vital Signs Last 24 Hrs  T(C): 36.4 (17 Jul 2020 04:57), Max: 36.7 (16 Jul 2020 12:44)  T(F): 97.6 (17 Jul 2020 04:57), Max: 98 (16 Jul 2020 12:44)  HR: 93 (17 Jul 2020 09:00) (84 - 133)  BP: 112/81 (17 Jul 2020 09:00) (94/64 - 133/88)  BP(mean): 88 (17 Jul 2020 09:00) (70 - 99)  RR: --  SpO2: 100% (17 Jul 2020 09:00) (97% - 100%)    Physical examination:  Constitutional: Pt intubated, lying in bed    Focused LE examination:   Vasc: DP and PT pulses non palpable b/l, CFT < 5 x 10, TG: warm to warm  Neuro and MSK: Unable to assess due to patient's condition    Derm:  Noted Multiple hyperpigmented scar-like lesions scattered all over LE, bl    Rt LE  - Full thickness ulcer with exposed peroneal tendons on lateral aspect of distal RLE that + probes to Fibula, no drainage, + undermining all around the clock, + tenderness. Negative crepitus or fluctuance to area of lateral aspect of the distal RLE.  - Rt 5th toe demonstrates a full thickness with less fibronecotic wound bed and evidence of increasing granulation tissue compared to the prior examination, measuring approx. 7.2 x 4.6 x 0.8 cm, - purulence, - malodor, - fluctuance +Probe to 5th metatarsal head, EDL tendon exposed and + undermining from the medial aspect, + tunneling to the 9 hour.     Lt LE:  -Full thickness ulcer round in shape and approx  0.5 cm distal to lateral malleolus, tunneling to 3 and 6 o'clock positions, + undermining all around, - drainage, - fluctuance - malodor, + probe to bone to the lateral malleolus, - purulence; negative crepitus and - fluctuance  - Multiple Full thickness ulcerations with exposed Achilles tendon noted to the posterior surface of the left distal 1/3 aspect of the left leg.  -Swelling and mild edema noted over the medial malleolus of LLE. Intact serous blister noted to the medial malleolar region. Edema noted to the dorsal-lateral aspect of the left fifth MPJ, noted to be mostly likely pressure induced; area of black-blue discoloration noted to the the left fifth sub met 5 region.   -Partial thickness ulcer, 4th interdigital space with presence of dry blood, - probe to bone, - undermining - tunneling  - Non gradable eschar on 3rd digit on Lt foot  - Full thickness ulceration noted to the dorsum of the left 1st MPJ. Positive probing to the left 1st MPJ. Positive purulence exuded from the wound with milking of the left foot.  - Full thickness ulceration noted to the lateral aspect of the upper 1/3 aspect of the leg at the level of the fibular notch. + tendon exposed; no drainage with palpation of the periwound area; no malodor exuded from the wound, negative fluctuance and negative crepitus to the area. Full thickness ulceration L fibular notch area noted to be covered with Alleyvn pad.     Imaging:    < from: US Duplex Arterial Lower Ext Compl, Bilateral (07.14.20 @ 14:49) >  IMPRESSION:  No evidence of a hemodynamically significant stenosis or occlusion in the visualized lower extremity arteries.  Subcutaneous edema throughout both lower extremities with fluid collections in both groins.  Monophasic waveforms throughout both lower extremities.  < end of copied text >      < from: TTE Echo Complete w/o Contrast w/ Doppler (06.27.20 @ 08:58) >   Impression   Summary   The mid to apical anterolateral to anterior segments are severely   hypokinetic. The apical inferoseptal wall appears hypokinetic.   Estimated left ventricular ejection fraction is 45-50 %.   A catheter wire is seen in the right atrium.   Mild (1+) tricuspid valve regurgitation is present.   Mild pulmonary hypertension.  < end of copied text >      < from: Xray Ankle Complete 3 Views, Bilateral (07.09.20 @ 16:42) >  EXAM:  XR ANKLE COMP MIN 3 VIEWS BI                        *** ADDENDUM 07/10/2020  ***                                                           Addendum:    The posterior soft tissue gas of the left ankle may be related to skin ulcerationor early fistulous tract formation. Clinical correlation is suggested.  *** END OF ADDENDUM 07/10/2020  ***  PROCEDURE DATE:  07/09/2020    INTERPRETATION:  Bilateral ankles  HISTORY: Bilateral ulcers    Three views of the right ankle and three views of the left ankle show no evidence of fracture nor destructive change. The joint spaces are maintained.  Subcutaneous soft tissue gas is present behind the left ankle.  IMPRESSION: Soft tissue gas as noted.  Thank you for this referral  ***Please see the addendum at the top of this report. It may contain additional important information or changes.****  < end of copied text >

## 2020-07-17 NOTE — CONSULT NOTE ADULT - ASSESSMENT
Critically ill and malnourished 39 year old with small 1.6x1.5cm lateral upper leg wound with no evidence of infection, small area of exposed tendon.   Recommend wound VAC to promote granulation tissue over the tendinous exposure. Bacitracin and dry soft dressing okay for now; wound VAC when possible. Padding as well as need to improve nutrition. Not a surgical candidate given severe malnourishment.  Expect this area to self granulate with nutrition improvement, pressure off loading, and wound VAC.

## 2020-07-17 NOTE — CONSULT NOTE ADULT - SUBJECTIVE AND OBJECTIVE BOX
39 year old with multiple medical problems, IVD abuser, trach'ed and PEG'd, bedbound for a long time, with small wound lateral upper left leg.      1.6cm x1.5cm wound down to muscle as well as small sliver of tendon exposed.  No cellulitis or drainage.   Patient unable to move extremities.      albumin 1.4

## 2020-07-17 NOTE — PROGRESS NOTE ADULT - ASSESSMENT
Assessment: 38 y/o female seen by podiatry at bedside in the CCU for the followin.) Full Thickness Fibronecrotic Ulcer of Right fifth digit  2.) Full Thickness Fibronecrotic Ulcer with exposed peroneal tendon, lateral aspect RLE  3.) Full thickness ulcer with exposed Achilles tendon LT  4.) Full thickness ulcer to lateral malleolus, LLE  5) Full thickness ulceration, dorsum of left 1st MTPJ  6) Partial Thickness ulcer 4th interspace, Lt foot  7) Full thickness ulceration, upper 1/3 aspect of left leg at level of fibular notch  8) Non gradable Eschar of 3rd digit, Lt foot  9) Multiple hyperpigmented scar-like lesions scattered all over LE, bl  10) Serous blister, medial malleolus L side  11) Pain in B/L Lower Extremities     Plan:  - Chart reviewed and patient evaluated by Podiatry team.  - X-rays of the b/l LEs reviewed at this time. Case discussed with radiologist in depth. Please note oval radiolucencies visualized in the b/l LEs X-rays are due to air in exposed open wounds.   - Final wound Cx. left 1st MTPJ full-thickness ulceration, reviewed  - All wounds to b/l LEs irrigated with copious amounts of sterile saline.  - SSD and adaptic applied over the right dorsolateral forefoot ulceration.   - Adaptic applied over exposed peroneal tendons to RLE.   - Adaptic applied over exposed Achilles tendon of LLE.   - Silvadene applied to wounds of L 3rd digit and L lateral malleolus ulceration.  - The b/l feet and ankles were wrapped with 4x4 gauze, ABD pad and kerlix, then secured with tape.   - Patient's right and left heels to be offloaded in b/l Z-flex boots to be worn at all times when patient bedbound.  - Vascular Consult, appreciated.   - Discussed case with ID department given purulence exuded from full thickness ulceration to left 1st MPJ and that deep wound cx was taken, Abx per ID appreciated.  - Discussed with CCU doctor the case, and recommendations, appreciated.  - Discussed findings of full-thickness ulceration at level of left fibular notch with Vascular surgery and Wound care nursing departments. Local wound care of full-thickness ulceration at level of left fibular notch per Wound care nursing department in addition to care of patient's sacral ulceration, appreciated.  - No podiatric surgical intervention necessary at this time as no abscess collections found to b/l lower extremities   - At this time, podiatry will provide supportive, and noninvasive wound care to the wounds, b/l lower extremities, given the fact that the patient is deemed in non stable medically and at risk for acute decompensation.  - Podiatry will follow the case closely while in house. Assessment: 40 y/o female seen by podiatry at bedside in the CCU for the followin.) Full Thickness Fibronecrotic Ulcer of Right fifth digit  2.) Full Thickness Fibronecrotic Ulcer with exposed peroneal tendon, lateral aspect RLE  3.) Full thickness ulcer with exposed Achilles tendon LT  4.) Full thickness ulcer to lateral malleolus, LLE  5) Full thickness ulceration, dorsum of left 1st MTPJ  6) Partial Thickness ulcer 4th interspace, Lt foot  7) Full thickness ulceration, upper 1/3 aspect of left leg at level of fibular notch  8) Non gradable Eschar of 3rd digit, Lt foot  9) Multiple hyperpigmented scar-like lesions scattered all over LE, bl  10) Serous blister, medial malleolus L side  11) Pain in B/L Lower Extremities     Plan:  - Chart reviewed and patient evaluated by Podiatry team with attending present.  - X-rays of the b/l LEs reviewed at this time. Case discussed with radiologist in depth. Please note oval radiolucencies visualized in the b/l LEs X-rays are due to air in exposed open wounds.   - Final wound Cx. left 1st MTPJ full-thickness ulceration, reviewed  - All wounds to b/l LEs irrigated with copious amounts of sterile saline.  - SSD and adaptic applied over the right dorsolateral forefoot ulceration.   - Adaptic applied over exposed peroneal tendons to RLE.   - Adaptic applied over exposed Achilles tendon of LLE.   - Silvadene applied to wounds of L 3rd digit and L lateral malleolus ulceration.  - The b/l feet and ankles were wrapped with 4x4 gauze, ABD pad and kerlix, then secured with tape.   - Patient's right and left heels to be offloaded in b/l Z-flex boots to be worn at all times when patient bedbound.  - Vascular Consult, appreciated.   - Discussed case with ID department given purulence exuded from full thickness ulceration to left 1st MPJ and that deep wound cx was taken, Abx per ID appreciated.  - Discussed with CCU doctor the case, and recommendations, appreciated.  - Discussed findings of full-thickness ulceration at level of left fibular notch with Vascular surgery and Wound care nursing departments. Local wound care of full-thickness ulceration at level of left fibular notch per Wound care nursing department in addition to care of patient's sacral ulceration, appreciated.  - No podiatric surgical intervention necessary at this time as no abscess collections found to b/l lower extremities   - At this time, podiatry will provide supportive, and noninvasive wound care to the wounds, b/l lower extremities, given the fact that the patient is deemed in non stable medically and at risk for acute decompensation.  - Podiatry will follow the case closely while in house.

## 2020-07-17 NOTE — PROGRESS NOTE ADULT - SUBJECTIVE AND OBJECTIVE BOX
Subjective:  Pt seen, POD 1 from PEG    Vital Signs:  Vital Signs Last 24 Hrs  T(C): 36.3 (07-17-20 @ 11:00), Max: 36.7 (07-16-20 @ 12:44)  T(F): 97.4 (07-17-20 @ 11:00), Max: 98 (07-16-20 @ 12:44)  HR: 93 (07-17-20 @ 09:00) (84 - 133)  BP: 112/81 (07-17-20 @ 09:00) (94/64 - 133/88)  RR: --  SpO2: 100% (07-17-20 @ 09:00) (97% - 100%) on (O2)    Telemetry/Alarms:    Relevant labs, radiology and Medications reviewed                        10.6   7.69  )-----------( 435      ( 16 Jul 2020 07:21 )             34.9     07-16    138  |  109<H>  |  38<H>  ----------------------------<  78  4.6   |  23  |  1.03    Ca    8.2<L>      16 Jul 2020 07:21  Phos  5.2     07-16  Mg     1.8     07-16    TPro  7.6  /  Alb  1.4<L>  /  TBili  0.3  /  DBili  x   /  AST  46<H>  /  ALT  25  /  AlkPhos  201<H>  07-16    PT/INR - ( 16 Jul 2020 07:21 )   PT: 13.0 sec;   INR: 1.13 ratio         PTT - ( 16 Jul 2020 07:21 )  PTT:25.1 sec  MEDICATIONS  (STANDING):  ascorbic acid 500 milliGRAM(s) Oral daily  chlorhexidine 0.12% Liquid 15 milliLiter(s) Oral Mucosa every 12 hours  cloNIDine 0.1 milliGRAM(s) Oral every 8 hours  collagenase Ointment 1 Application(s) Topical daily  Dakins Solution - 1/4 Strength 1 Application(s) Topical daily  dexMEDEtomidine Infusion 0.4 MICROgram(s)/kG/Hr (5.44 mL/Hr) IV Continuous <Continuous>  doxycycline hyclate Capsule 100 milliGRAM(s) Oral every 12 hours  enoxaparin Injectable 40 milliGRAM(s) SubCutaneous daily  famotidine    Tablet 20 milliGRAM(s) Oral two times a day  fluconAZOLE IVPB 400 milliGRAM(s) IV Intermittent every 24 hours  folic acid 1 milliGRAM(s) Oral daily  melatonin 5 milliGRAM(s) Oral at bedtime  multivitamin 1 Tablet(s) Oral daily  silver sulfADIAZINE 1% Cream 1 Application(s) Topical daily  thiamine 100 milliGRAM(s) Oral daily    MEDICATIONS  (PRN):  acetaminophen   Tablet .. 650 milliGRAM(s) Oral every 4 hours PRN Temp greater or equal to 38C (100.4F), Mild Pain (1 - 3)  ALPRAZolam 0.5 milliGRAM(s) Oral every 6 hours PRN Anxiety  glucagon  Injectable 1 milliGRAM(s) IntraMuscular once PRN Glucose <70 milliGRAM(s)/deciLiter  hydrOXYzine hydrochloride 50 milliGRAM(s) Oral every 4 hours PRN Anxiety  loperamide 2 milliGRAM(s) Oral every 4 hours PRN Loose stool  oxyCODONE    IR 5 milliGRAM(s) Oral every 6 hours PRN Moderate Pain (4 - 6)      Physical exam  Gen NAD  Neuro alert, follows commands  neck- trach midline  Card RRR  Pulm clear  Abd soft, PEG in place   Ext warm, dressings LEs and RUE intact      I&O's Summary    16 Jul 2020 07:01  -  17 Jul 2020 07:00  --------------------------------------------------------  IN: 507 mL / OUT: 0 mL / NET: 507 mL        Assessment  39y Female  w/ PAST MEDICAL & SURGICAL HISTORY:  Smoker  Heroin abuse  H/O Raynaud's syndrome  Rheumatoid arthritis  Lupus  Gall bladder stones  H/O appendicitis  H/O tubal ligation  admitted with complaints of Patient is a 39y old  Female who presents with a chief complaint of Cellulitis with sepsis, symptomatic anemia. (17 Jul 2020 10:49)  .  39y Female h/o lupus, RA, epilepsy (last seizure 8 yrs ago), chronic pain, IVDA admitted w weakness, failure to thrive. Found to have acute anemia, NATALIA, transaminitis, metabolic lactic acidosis, severe dehydration, severe protein calorie malnutrition. And severe sepsis with septic shock secondary to MRSA bacteremia and UTI treated with a two-week course of Vancomycin, developed worsening respiratory failure and was ultimately intubated on 6/27/20.  s/p tracheostomy 7/13 currently being treated for fungemia. POD 1 PEG    start PEG TFs 24 hours from placement  care as per ICU    Discussed with Cardiothoracic Team at AM rounds.

## 2020-07-18 LAB
ANION GAP SERPL CALC-SCNC: 8 MMOL/L — SIGNIFICANT CHANGE UP (ref 5–17)
BUN SERPL-MCNC: 30 MG/DL — HIGH (ref 7–23)
CALCIUM SERPL-MCNC: 7.8 MG/DL — LOW (ref 8.5–10.1)
CHLORIDE SERPL-SCNC: 109 MMOL/L — HIGH (ref 96–108)
CO2 SERPL-SCNC: 19 MMOL/L — LOW (ref 22–31)
CREAT SERPL-MCNC: 0.91 MG/DL — SIGNIFICANT CHANGE UP (ref 0.5–1.3)
GLUCOSE SERPL-MCNC: 90 MG/DL — SIGNIFICANT CHANGE UP (ref 70–99)
HCT VFR BLD CALC: 32.2 % — LOW (ref 34.5–45)
HGB BLD-MCNC: 9.8 G/DL — LOW (ref 11.5–15.5)
MCHC RBC-ENTMCNC: 28.8 PG — SIGNIFICANT CHANGE UP (ref 27–34)
MCHC RBC-ENTMCNC: 30.4 GM/DL — LOW (ref 32–36)
MCV RBC AUTO: 94.7 FL — SIGNIFICANT CHANGE UP (ref 80–100)
PLATELET # BLD AUTO: 315 K/UL — SIGNIFICANT CHANGE UP (ref 150–400)
POTASSIUM SERPL-MCNC: 4.9 MMOL/L — SIGNIFICANT CHANGE UP (ref 3.5–5.3)
POTASSIUM SERPL-SCNC: 4.9 MMOL/L — SIGNIFICANT CHANGE UP (ref 3.5–5.3)
RBC # BLD: 3.4 M/UL — LOW (ref 3.8–5.2)
RBC # FLD: 15.7 % — HIGH (ref 10.3–14.5)
SODIUM SERPL-SCNC: 136 MMOL/L — SIGNIFICANT CHANGE UP (ref 135–145)
WBC # BLD: 9.16 K/UL — SIGNIFICANT CHANGE UP (ref 3.8–10.5)
WBC # FLD AUTO: 9.16 K/UL — SIGNIFICANT CHANGE UP (ref 3.8–10.5)

## 2020-07-18 PROCEDURE — 99233 SBSQ HOSP IP/OBS HIGH 50: CPT

## 2020-07-18 RX ORDER — OXYCODONE HYDROCHLORIDE 5 MG/1
5 TABLET ORAL EVERY 6 HOURS
Refills: 0 | Status: DISCONTINUED | OUTPATIENT
Start: 2020-07-18 | End: 2020-07-25

## 2020-07-18 RX ADMIN — Medication 0.5 MILLIGRAM(S): at 22:08

## 2020-07-18 RX ADMIN — Medication 100 MILLIGRAM(S): at 22:09

## 2020-07-18 RX ADMIN — Medication 500 MILLIGRAM(S): at 09:54

## 2020-07-18 RX ADMIN — OXYCODONE HYDROCHLORIDE 5 MILLIGRAM(S): 5 TABLET ORAL at 09:56

## 2020-07-18 RX ADMIN — Medication 0.1 MILLIGRAM(S): at 22:08

## 2020-07-18 RX ADMIN — Medication 100 MILLIGRAM(S): at 09:57

## 2020-07-18 RX ADMIN — Medication 0.5 MILLIGRAM(S): at 15:49

## 2020-07-18 RX ADMIN — CHLORHEXIDINE GLUCONATE 15 MILLILITER(S): 213 SOLUTION TOPICAL at 22:09

## 2020-07-18 RX ADMIN — Medication 1 MILLIGRAM(S): at 09:56

## 2020-07-18 RX ADMIN — Medication 0.1 MILLIGRAM(S): at 18:03

## 2020-07-18 RX ADMIN — Medication 1 TABLET(S): at 09:57

## 2020-07-18 RX ADMIN — Medication 0.1 MILLIGRAM(S): at 10:15

## 2020-07-18 RX ADMIN — Medication 100 MILLIGRAM(S): at 09:56

## 2020-07-18 RX ADMIN — Medication 0.5 MILLIGRAM(S): at 03:16

## 2020-07-18 RX ADMIN — Medication 1 APPLICATION(S): at 09:57

## 2020-07-18 RX ADMIN — OXYCODONE HYDROCHLORIDE 5 MILLIGRAM(S): 5 TABLET ORAL at 15:49

## 2020-07-18 RX ADMIN — Medication 1 APPLICATION(S): at 09:54

## 2020-07-18 RX ADMIN — ENOXAPARIN SODIUM 40 MILLIGRAM(S): 100 INJECTION SUBCUTANEOUS at 09:57

## 2020-07-18 RX ADMIN — Medication 0.5 MILLIGRAM(S): at 09:18

## 2020-07-18 RX ADMIN — CHLORHEXIDINE GLUCONATE 15 MILLILITER(S): 213 SOLUTION TOPICAL at 09:53

## 2020-07-18 RX ADMIN — OXYCODONE HYDROCHLORIDE 5 MILLIGRAM(S): 5 TABLET ORAL at 22:09

## 2020-07-18 RX ADMIN — OXYCODONE HYDROCHLORIDE 5 MILLIGRAM(S): 5 TABLET ORAL at 03:17

## 2020-07-18 RX ADMIN — OXYCODONE HYDROCHLORIDE 5 MILLIGRAM(S): 5 TABLET ORAL at 04:10

## 2020-07-18 RX ADMIN — Medication 5 MILLIGRAM(S): at 22:08

## 2020-07-18 RX ADMIN — Medication 650 MILLIGRAM(S): at 15:48

## 2020-07-18 NOTE — PROGRESS NOTE ADULT - ASSESSMENT
Assessment: 40 y/o female seen by podiatry at bedside in the CCU for the followin.) Full Thickness Fibronecrotic Ulcer of Right fifth digit  2.) Full Thickness Fibronecrotic Ulcer with exposed peroneal tendon, lateral aspect RLE  3.) Full thickness ulcer with exposed Achilles tendon LT  4.) Full thickness ulcer to lateral malleolus, LLE  5) Full thickness ulceration, dorsum of left 1st MTPJ  6) Partial Thickness ulcer 4th interspace, Lt foot  7) Full thickness ulceration, upper 1/3 aspect of left leg at level of fibular notch  8) Non gradable Eschar of 3rd digit, Lt foot  9) Multiple hyperpigmented scar-like lesions scattered all over LE, bl  10) Serous blister, medial malleolus L side  11) Pain in B/L Lower Extremities     Plan:  - Chart reviewed and patient evaluated by Podiatry team.   - X-rays of the b/l LEs reviewed at this time. Case discussed with radiologist in depth. Please note oval radiolucencies visualized in the b/l LEs X-rays are due to air in exposed open wounds.   - Final wound Cx. left 1st MTPJ full-thickness ulceration, reviewed  - All wounds to b/l LEs irrigated with copious amounts of sterile saline.  - SSD and adaptic applied over the right dorsolateral forefoot ulceration.   - Adaptic applied over exposed peroneal tendons to RLE.   - Adaptic applied over exposed Achilles tendon of LLE.   - Silvadene applied to wounds of L 3rd digit and L lateral malleolus ulceration.  - The b/l feet and ankles were wrapped with 4x4 gauze, ABD pad and kerlix, then secured with tape.   - Patient's right and left heels to be offloaded in b/l Z-flex boots to be worn at all times when patient bedbound.  - Vascular Consult, appreciated.   - Discussed case with ID department given purulence exuded from full thickness ulceration to left 1st MPJ and that deep wound cx was taken, Abx per ID appreciated.  - Discussed with CCU doctor the case, and recommendations, appreciated.  - Discussed findings of full-thickness ulceration at level of left fibular notch with Vascular surgery and Wound care nursing departments. Local wound care of full-thickness ulceration at level of left fibular notch per Wound care nursing department in addition to care of patient's sacral ulceration, appreciated.  - No podiatric surgical intervention necessary at this time as no abscess collections found to b/l lower extremities   - At this time, podiatry will provide supportive, and noninvasive wound care to the wounds, b/l lower extremities, given the fact that the patient is deemed in non stable medically and at risk for acute decompensation.  - Nutrition supplementation per Dietician given albumin level decreased, appreciated.   - Podiatry will follow the case closely while in house.

## 2020-07-18 NOTE — PROGRESS NOTE ADULT - SUBJECTIVE AND OBJECTIVE BOX
Date of service: 7/18/2020  Chief Complaint: B/L lower extremity wounds     HPI: Pt was examined at bedside by podiatry with attending present, for B/L lower extremity ulcerative lesions. Pt is a 40 yo female who has been admitted to the CCU for septic shock, UTI with MRSA and bacteremia. Noted Pt is currently intubated and non verbal, she is alert and oriented. The patient is also being treated for anemia, acute respiratory failure and toxic metabolic encephalopathy. Of Note, Pt has a history of IVDA drug use and is critically ill and at risk for acute decompensation possible death per the intensivist. Due to the patient being intubated all additional medical history and information was gathered from the patients chart.    7/18: Patient seen and examined by podiatry for follow-up evaluation of right and left lower extremity wounds. Patient noted be arousable at bedside and patient s/p tracheostomy procedure performed on 7/13/2020 and PEG placed on 7/16/2020. Patient noted to be responsive to stimuli when her lower extremities are examined by Podiatry.  Patient continues to winch when Podiatry examines her bilateral lower extremities. All further HPI cannot be obtained due to patient's current condition and obtained from patient's chart.     REVIEW OF SYSTEMS:  Unable to assess due to pt's inability to communicate per intubation    Allergies:  morphine (Unknown)    Past Medical History:  Lupus, RA, Reynard's Epilepsy, IVDA     Family History:  No pertinent family history in first degree relatives: cannot recall family history at this time    Social History:  lives alone  single  smokes 4 cig/day  uses two bags of heroin a day (24 Jun 2020 01:21)    LABS:  Complete Blood Count (07.16.20 @ 07:21)    WBC Count: 7.69 K/uL    RBC Count: 3.65 M/uL    Hemoglobin: 10.6 g/dL    Hematocrit: 34.9 %    Mean Cell Volume: 95.6 fl    Mean Cell Hemoglobin: 29.0 pg    Mean Cell Hemoglobin Conc: 30.4 gm/dL    Red Cell Distrib Width: 16.1 %    Platelet Count - Automated: 435 K/uL    Comprehensive Metabolic Panel (07.16.20 @ 07:21)    Sodium, Serum: 138 mmol/L    Potassium, Serum: 4.6 mmol/L    Chloride, Serum: 109 mmol/L    Carbon Dioxide, Serum: 23 mmol/L    Anion Gap, Serum: 6 mmol/L    Blood Urea Nitrogen, Serum: 38 mg/dL    Creatinine, Serum: 1.03 mg/dL    Glucose, Serum: 78 mg/dL    Calcium, Total Serum: 8.2 mg/dL    Protein Total, Serum: 7.6 gm/dL    Albumin, Serum: 1.4 g/dL    Bilirubin Total, Serum: 0.3 mg/dL    Alkaline Phosphatase, Serum: 201 U/L    Aspartate Aminotransferase (AST/SGOT): 46 U/L    Alanine Aminotransferase (ALT/SGPT): 25 U/L    eGFR if Non : 69: Interpretative comment  The units for eGFR are mL/min/1.73M2 (normalized body surface area). The  eGFR is calculated from a serum creatinine using the CKD-EPI equation.  Other variables required for calculation are race, age and sex. Among  patients with chronic kidney disease (CKD), the eGFR is useful in  determining the stage of disease according to KDOQI CKD classification.  All eGFR results are reported numerically with the following  interpretation.          GFR                    With                 Without     (ml/min/1.73 m2)    Kidney Damage       Kidney Damage        >= 90                    Stage 1                     Normal        60-89                    Stage 2                     Decreased GFR        30-59     Stage 3                     Stage 3        15-29                    Stage 4                     Stage 4        < 15                      Stage 5                     Stage 5  Each stage of CKD assumes that the associated GFR level has been in  effect for at least 3 months. Determination of stages one and two (with  eGFR > 59 ml/min/m2) requires estimation of kidney damage for at least 3  months as defined by structural or functional abnormalities.  Limitations: All estimates of GFR will be less accurate for patients at  extremes of muscle mass (including but not limited to frail elderly,  critically ill, or cancer patients), those with unusual diets, and those  with conditions associated with reduced secretion or extrarenal  elimination of creatinine. The eGFR equation is not recommended for use  in patients with unstable creatinine levels. mL/min/1.73M2    eGFR if African American: 80 mL/min/1.73M2    Albumin, Serum: 1.4 g/dL (07.16.20 @ 07:21)    .Other L 1st MPJ deep wound cx  07-13 @ 09:30   Rare Methicillin resistant Staphylococcus aureus  --  Methicillin resistant Staphylococcus aureus    .Blood Blood-Peripheral  07-06 @ 14:30   Growth in aerobic bottle: Brittny dubliniensis  "Due to technical problems, Proteus sp. will Not be reported as part of  the BCID panel until further notice"  ***Blood Panel PCR results on this specimen are available  approximately 3 hours after the Gram stain result.***  Gram stain, PCR, and/or culture results may not always  correspond due to difference in methodologies.  ************************************************************  This PCR assay was performed using [employer].  The following targets are tested for: Enterococcus,  vancomycin resistant enterococci, Listeria monocytogenes,  coagulase negative staphylococci, S. aureus,  methicillin resistant S. aureus, Streptococcus agalactiae  (Group B), S. pneumoniae, S. pyogenes (Group A),  Acinetobacter baumannii, Enterobacter cloacae, E. coli,  Klebsiella oxytoca, K. pneumoniae, Proteus sp.,  Serratia marcescens, Haemophilus influenzae,  Neisseria meningitidis, Pseudomonas aeruginosa, Candida  albicans, C. glabrata, C krusei, C parapsilosis,  C. tropicalis and the KPC resistance gene.  --  Blood Culture PCR  Brittny dubliniensis      .Sputum Sputum trap  06-29 @ 08:29   Normal Respiratory Elsa present  --    No Squamous epithelial cells per low power field  Moderate polymorphonuclear leukocytes per low power field  Moderate Yeast per oil power field    .Urine None  06-24 @ 05:41   >100,000 CFU/ml Methicillin resistant Staphylococcus aureus  <10,000 CFU/ml Normal Urogenital elsa present  --  Methicillin resistant Staphylococcus aureus    .Blood None  06-23 @ 22:22   Growth in aerobic and anaerobic bottles: Methicillin resistant  Staphylococcus aureus  See previous culture [id number]  --  Blood Culture PCR  Methicillin resistant Staphylococcus aureus    Vital Signs Last 24 Hrs  T(C): 36.1 (18 Jul 2020 05:46), Max: 37.4 (17 Jul 2020 18:57)  T(F): 97 (18 Jul 2020 05:46), Max: 99.3 (17 Jul 2020 18:57)  HR: 130 (18 Jul 2020 08:00) (90 - 130)  BP: 139/94 (18 Jul 2020 08:00) (107/74 - 140/96)  BP(mean): 103 (18 Jul 2020 08:00) (79 - 107)  RR: 32 (18 Jul 2020 08:00) (19 - 32)  SpO2: 100% (18 Jul 2020 08:00) (63% - 100%)    Physical examination:  Constitutional: Pt intubated, lying in bed    Focused LE examination:   Vasc: DP and PT pulses non palpable b/l, CFT < 5 x 10, TG: warm to warm  Neuro and MSK: Unable to assess due to patient's condition    Derm:  Noted Multiple hyperpigmented scar-like lesions scattered all over LE, bl    Rt LE  - Full thickness ulcer with exposed peroneal tendons on lateral aspect of distal RLE that + probes to Fibula, no drainage, + undermining all around the clock, + tenderness. Negative crepitus or fluctuance to area of lateral aspect of the distal RLE.  - Rt 5th toe demonstrates a full thickness with less fibronecotic wound bed and evidence of increasing granulation tissue compared to the prior examination, measuring approx. 7.2 x 4.6 x 0.8 cm, - purulence, - malodor, - fluctuance +Probe to 5th metatarsal head, EDL tendon exposed and + undermining from the medial aspect, + tunneling to the 9 hour.     Lt LE:  -Full thickness ulcer round in shape and approx  0.5 cm distal to lateral malleolus, tunneling to 3 and 6 o'clock positions, + undermining all around, - drainage, - fluctuance - malodor, + probe to bone to the lateral malleolus, - purulence; negative crepitus and - fluctuance  - Multiple Full thickness ulcerations with exposed Achilles tendon noted to the posterior surface of the left distal 1/3 aspect of the left leg.  -Swelling and mild edema noted over the medial malleolus of LLE. Intact serous blister noted to the medial malleolar region. Edema noted to the dorsal-lateral aspect of the left fifth MPJ, noted to be mostly likely pressure induced; area of black-blue discoloration noted to the the left fifth sub met 5 region.   -Partial thickness ulcer, 4th interdigital space with presence of dry blood, - probe to bone, - undermining - tunneling  - Non gradable eschar on 3rd digit on Lt foot  - Full thickness ulceration noted to the dorsum of the left 1st MPJ. Positive probing to the left 1st MPJ. Positive purulence exuded from the wound with milking of the left foot.  - Full thickness ulceration noted to the lateral aspect of the upper 1/3 aspect of the leg at the level of the fibular notch. + tendon exposed; no drainage with palpation of the periwound area; no malodor exuded from the wound, negative fluctuance and negative crepitus to the area. Full thickness ulceration L fibular notch area noted to be covered with Alleyvn pad.     Imaging:    < from: US Duplex Arterial Lower Ext Compl, Bilateral (07.14.20 @ 14:49) >  IMPRESSION:  No evidence of a hemodynamically significant stenosis or occlusion in the visualized lower extremity arteries.  Subcutaneous edema throughout both lower extremities with fluid collections in both groins.  Monophasic waveforms throughout both lower extremities.  < end of copied text >      < from: TTE Echo Complete w/o Contrast w/ Doppler (06.27.20 @ 08:58) >   Impression   Summary   The mid to apical anterolateral to anterior segments are severely   hypokinetic. The apical inferoseptal wall appears hypokinetic.   Estimated left ventricular ejection fraction is 45-50 %.   A catheter wire is seen in the right atrium.   Mild (1+) tricuspid valve regurgitation is present.   Mild pulmonary hypertension.  < end of copied text >      < from: Xray Ankle Complete 3 Views, Bilateral (07.09.20 @ 16:42) >  EXAM:  XR ANKLE COMP MIN 3 VIEWS BI                        *** ADDENDUM 07/10/2020  ***                                                           Addendum:    The posterior soft tissue gas of the left ankle may be related to skin ulcerationor early fistulous tract formation. Clinical correlation is suggested.  *** END OF ADDENDUM 07/10/2020  ***  PROCEDURE DATE:  07/09/2020    INTERPRETATION:  Bilateral ankles  HISTORY: Bilateral ulcers    Three views of the right ankle and three views of the left ankle show no evidence of fracture nor destructive change. The joint spaces are maintained.  Subcutaneous soft tissue gas is present behind the left ankle.  IMPRESSION: Soft tissue gas as noted.  Thank you for this referral  ***Please see the addendum at the top of this report. It may contain additional important information or changes.****  < end of copied text >

## 2020-07-18 NOTE — PROGRESS NOTE ADULT - ASSESSMENT
38 yo female pmhx IVDA, lupus, RA, epilepsy (last seizure 8 years ago), recent use of snorting heroin (2 bags daily) presented with FTF admitted with anemia, NATALIA, transaminitis, metabolic lactic acidosis, severe dehydration/malnutrition, respiratory failure requiring intubation now s/p trach (7/13), septic shock 2/2 MRSA bacteremia and UTI now fungemic.     s/p PEG   will attempt switch to Trach collar today  ID following  Podiatry following      PLAN:  - switch to Tcollar, as tolerated, chest PT  - Celulitis, MRSA bacteremia, cont with Doxy, Fluconazole, follow up ID  - BP control  - monitor electrolytes  - feeds via PEG  - Pain control, Dilaudid, Precedex  - DVT ppx  - Obtain  labs  - Monitor in CCU

## 2020-07-18 NOTE — PROGRESS NOTE ADULT - SUBJECTIVE AND OBJECTIVE BOX
40 yo f pmhx IVDA, lupus, RA, epilepsy (last seizure 8 years ago), recent use of snorting heroin (2 bags daily) presented with FTF admitted with anemia, NATALIA, transaminitis, metabolic lactic acidosis, severe dehydration/malnutrition, respiratory failure requiring intubation now s/p trach (7/13), septic shock 2/2 MRSA bacteremia and UTI now fungemic.     Patient currently +trachoestomy, s/p PEG placement  started on feeds    7/18: overnight was tachypneic, placed back on pressure support        en: Awake, anxious NAD  HEENT: NCAT, EOMI  Neck: Supple, +trach  CV: nml S1S2, RRR  Lungs: CTABL  Abd: Soft, NT, ND, BS+, +PEG  Ext: +edema, b/l LE wrapped with dressings.  RUE wrapped with dressings as well. Neuro: Non focal   Skin: warm, wounds in dressings        Labs pending  NO new imaging

## 2020-07-19 PROCEDURE — 99232 SBSQ HOSP IP/OBS MODERATE 35: CPT

## 2020-07-19 RX ORDER — CLONAZEPAM 1 MG
1 TABLET ORAL
Refills: 0 | Status: DISCONTINUED | OUTPATIENT
Start: 2020-07-19 | End: 2020-07-26

## 2020-07-19 RX ADMIN — Medication 0.1 MILLIGRAM(S): at 15:10

## 2020-07-19 RX ADMIN — OXYCODONE HYDROCHLORIDE 5 MILLIGRAM(S): 5 TABLET ORAL at 10:28

## 2020-07-19 RX ADMIN — Medication 100 MILLIGRAM(S): at 22:08

## 2020-07-19 RX ADMIN — Medication 1 MILLIGRAM(S): at 22:08

## 2020-07-19 RX ADMIN — OXYCODONE HYDROCHLORIDE 5 MILLIGRAM(S): 5 TABLET ORAL at 11:58

## 2020-07-19 RX ADMIN — OXYCODONE HYDROCHLORIDE 5 MILLIGRAM(S): 5 TABLET ORAL at 18:58

## 2020-07-19 RX ADMIN — Medication 0.1 MILLIGRAM(S): at 04:22

## 2020-07-19 RX ADMIN — Medication 0.5 MILLIGRAM(S): at 04:22

## 2020-07-19 RX ADMIN — OXYCODONE HYDROCHLORIDE 5 MILLIGRAM(S): 5 TABLET ORAL at 04:23

## 2020-07-19 RX ADMIN — Medication 1 TABLET(S): at 10:29

## 2020-07-19 RX ADMIN — Medication 650 MILLIGRAM(S): at 22:50

## 2020-07-19 RX ADMIN — Medication 500 MILLIGRAM(S): at 10:28

## 2020-07-19 RX ADMIN — OXYCODONE HYDROCHLORIDE 5 MILLIGRAM(S): 5 TABLET ORAL at 08:59

## 2020-07-19 RX ADMIN — Medication 100 MILLIGRAM(S): at 10:29

## 2020-07-19 RX ADMIN — CHLORHEXIDINE GLUCONATE 15 MILLILITER(S): 213 SOLUTION TOPICAL at 22:32

## 2020-07-19 RX ADMIN — Medication 1 MILLIGRAM(S): at 10:29

## 2020-07-19 RX ADMIN — Medication 1 MILLIGRAM(S): at 11:42

## 2020-07-19 RX ADMIN — Medication 0.5 MILLIGRAM(S): at 10:28

## 2020-07-19 RX ADMIN — Medication 0.5 MILLIGRAM(S): at 23:50

## 2020-07-19 RX ADMIN — OXYCODONE HYDROCHLORIDE 5 MILLIGRAM(S): 5 TABLET ORAL at 23:50

## 2020-07-19 RX ADMIN — Medication 1 APPLICATION(S): at 11:35

## 2020-07-19 RX ADMIN — ENOXAPARIN SODIUM 40 MILLIGRAM(S): 100 INJECTION SUBCUTANEOUS at 10:28

## 2020-07-19 RX ADMIN — Medication 5 MILLIGRAM(S): at 22:08

## 2020-07-19 RX ADMIN — Medication 650 MILLIGRAM(S): at 22:08

## 2020-07-19 RX ADMIN — Medication 100 MILLIGRAM(S): at 10:28

## 2020-07-19 RX ADMIN — Medication 0.5 MILLIGRAM(S): at 17:53

## 2020-07-19 RX ADMIN — Medication 1 APPLICATION(S): at 11:46

## 2020-07-19 RX ADMIN — Medication 0.1 MILLIGRAM(S): at 22:08

## 2020-07-19 RX ADMIN — OXYCODONE HYDROCHLORIDE 5 MILLIGRAM(S): 5 TABLET ORAL at 17:53

## 2020-07-19 RX ADMIN — CHLORHEXIDINE GLUCONATE 15 MILLILITER(S): 213 SOLUTION TOPICAL at 11:35

## 2020-07-19 NOTE — PROGRESS NOTE ADULT - SUBJECTIVE AND OBJECTIVE BOX
Date of service: 7/19/2020  Chief Complaint: B/L lower extremity wounds     HPI: Pt was examined at bedside by podiatry with attending present, for B/L lower extremity ulcerative lesions. Pt is a 38 yo female who has been admitted to the CCU for septic shock, UTI with MRSA and bacteremia. Noted Pt is currently intubated and non verbal, she is alert and oriented. The patient is also being treated for anemia, acute respiratory failure and toxic metabolic encephalopathy. Of Note, Pt has a history of IVDA drug use and is critically ill and at risk for acute decompensation possible death per the intensivist. Due to the patient being intubated all additional medical history and information was gathered from the patients chart.    7/19: Patient seen and examined by podiatry for follow-up evaluation of right and left lower extremity wounds with attending present. Patient noted be arousable at bedside and patient s/p tracheostomy procedure performed on 7/13/2020 and PEG placed on 7/16/2020. Patient noted to be responsive to stimuli when her lower extremities are examined by Podiatry.  All further HPI cannot be obtained due to patient's condition. All further HPI obtained via patient's chart.    REVIEW OF SYSTEMS:  Unable to assess due to pt's inability to communicate per intubation    Allergies:  morphine (Unknown)    Past Medical History:  Lupus, RA, Reynard's Epilepsy, IVDA     Family History:  No pertinent family history in first degree relatives: cannot recall family history at this time    Social History:  lives alone  single  smokes 4 cig/day  uses two bags of heroin a day (24 Jun 2020 01:21)    LABS:  Complete Blood Count in AM (07.18.20 @ 11:52)    WBC Count: 9.16 K/uL    RBC Count: 3.40 M/uL    Hemoglobin: 9.8 g/dL    Hematocrit: 32.2 %    Mean Cell Volume: 94.7 fl    Mean Cell Hemoglobin: 28.8 pg    Mean Cell Hemoglobin Conc: 30.4 gm/dL    Red Cell Distrib Width: 15.7 %    Platelet Count - Automated: 315 K/uL    Basic Metabolic Panel in AM (07.18.20 @ 11:52)    Sodium, Serum: 136 mmol/L    Potassium, Serum: 4.9 mmol/L    Chloride, Serum: 109 mmol/L    Carbon Dioxide, Serum: 19 mmol/L    Anion Gap, Serum: 8 mmol/L    Blood Urea Nitrogen, Serum: 30 mg/dL    Creatinine, Serum: 0.91 mg/dL    Glucose, Serum: 90 mg/dL    Calcium, Total Serum: 7.8 mg/dL    eGFR if Non : 80: Interpretative comment  The units for eGFR are mL/min/1.73M2 (normalized body surface area). The  eGFR is calculated from a serum creatinine using the CKD-EPI equation.  Other variables required for calculation are race, age and sex. Among  patients with chronic kidney disease (CKD), the eGFR is useful in  determining the stage of disease according to KDOQI CKD classification.  All eGFR results are reported numerically with the following  interpretation.          GFR                    With                 Without     (ml/min/1.73 m2)    Kidney Damage       Kidney Damage        >= 90                    Stage 1                     Normal        60-89                    Stage 2                     Decreased GFR        30-59     Stage 3                     Stage 3        15-29                    Stage 4                     Stage 4        < 15                      Stage 5                     Stage 5  Each stage of CKD assumes that the associated GFR level has been in  effect for at least 3 months. Determination of stages one and two (with  eGFR > 59 ml/min/m2) requires estimation of kidney damage for at least 3  months as defined by structural or functional abnormalities.  Limitations: All estimates of GFR will be less accurate for patients at  extremes of muscle mass (including but not limited to frail elderly,  critically ill, or cancer patients), those with unusual diets, and those  with conditions associated with reduced secretion or extrarenal  elimination of creatinine. The eGFR equation is not recommended for use  in patients with unstable creatinine levels. mL/min/1.73M2    eGFR if African American: 93 mL/min/1.73M2    Albumin, Serum: 1.4 g/dL (07.16.20 @ 07:21)    .Other L 1st MPJ deep wound cx  07-13 @ 09:30   Rare Methicillin resistant Staphylococcus aureus  --  Methicillin resistant Staphylococcus aureus    .Blood Blood-Peripheral  07-06 @ 14:30   Growth in aerobic bottle: Brittny dubliniensis  "Due to technical problems, Proteus sp. will Not be reported as part of  the BCID panel until further notice"  ***Blood Panel PCR results on this specimen are available  approximately 3 hours after the Gram stain result.***  Gram stain, PCR, and/or culture results may not always  correspond due to difference in methodologies.  ************************************************************  This PCR assay was performed using [employer].  The following targets are tested for: Enterococcus,  vancomycin resistant enterococci, Listeria monocytogenes,  coagulase negative staphylococci, S. aureus,  methicillin resistant S. aureus, Streptococcus agalactiae  (Group B), S. pneumoniae, S. pyogenes (Group A),  Acinetobacter baumannii, Enterobacter cloacae, E. coli,  Klebsiella oxytoca, K. pneumoniae, Proteus sp.,  Serratia marcescens, Haemophilus influenzae,  Neisseria meningitidis, Pseudomonas aeruginosa, Candida  albicans, C. glabrata, C krusei, C parapsilosis,  C. tropicalis and the KPC resistance gene.  --  Blood Culture PCR  Brittny dubliniensis      .Sputum Sputum trap  06-29 @ 08:29   Normal Respiratory Elsa present  --    No Squamous epithelial cells per low power field  Moderate polymorphonuclear leukocytes per low power field  Moderate Yeast per oil power field    .Urine None  06-24 @ 05:41   >100,000 CFU/ml Methicillin resistant Staphylococcus aureus  <10,000 CFU/ml Normal Urogenital elsa present  --  Methicillin resistant Staphylococcus aureus    .Blood None  06-23 @ 22:22   Growth in aerobic and anaerobic bottles: Methicillin resistant  Staphylococcus aureus  See previous culture [id number]  --  Blood Culture PCR  Methicillin resistant Staphylococcus aureus    Vital Signs Last 24 Hrs  T(C): 36.1 (18 Jul 2020 05:46), Max: 37.4 (17 Jul 2020 18:57)  T(F): 97 (18 Jul 2020 05:46), Max: 99.3 (17 Jul 2020 18:57)  HR: 130 (18 Jul 2020 08:00) (90 - 130)  BP: 139/94 (18 Jul 2020 08:00) (107/74 - 140/96)  BP(mean): 103 (18 Jul 2020 08:00) (79 - 107)  RR: 32 (18 Jul 2020 08:00) (19 - 32)  SpO2: 100% (18 Jul 2020 08:00) (63% - 100%)    Physical examination:  Constitutional: Pt intubated, lying in bed    Focused LE examination:   Vasc: DP and PT pulses non palpable b/l, CFT < 5 x 10, TG: warm to warm  Neuro and MSK: Unable to assess due to patient's condition    Derm:  Noted Multiple hyperpigmented scar-like lesions scattered all over LE, bl    Rt LE  - Full thickness ulcer with exposed peroneal tendons on lateral aspect of distal RLE that + probes to Fibula, no drainage, + undermining all around the clock, + tenderness. Negative crepitus or fluctuance to area of lateral aspect of the distal RLE.  - Rt 5th toe demonstrates a full thickness with less fibronecotic wound bed and evidence of increasing granulation tissue compared to the prior examination, measuring approximately 7.2 x 4.6 x 0.8 cm, - purulence, - malodor, - fluctuance +Probe to 5th metatarsal head, EDL tendon exposed and + undermining from the medial aspect, + tunneling to the 9 hour.     Lt LE:  -Full thickness ulcer round in shape and approx  0.5 cm distal to lateral malleolus, tunneling to 3 and 6 o'clock positions, + undermining all around, - drainage, - fluctuance - malodor, + probe to bone to the lateral malleolus, - purulence; negative crepitus and - fluctuance  - Multiple Full thickness ulcerations with exposed Achilles tendon noted to the posterior surface of the left distal 1/3 aspect of the left leg.  -Swelling and mild edema noted over the medial malleolus of LLE. Intact serous blister noted to the medial malleolar region. Edema noted to the dorsal-lateral aspect of the left fifth MPJ, noted to be mostly likely pressure induced; area of black-blue discoloration noted to the the left fifth sub met 5 region.   -Partial thickness ulcer, 4th interdigital space with presence of dry blood, - probe to bone, - undermining - tunneling  - Non gradable eschar on 3rd digit on Lt foot  - Full thickness ulceration noted to the dorsum of the left 1st MPJ. Positive probing to the left 1st MPJ. Positive purulence exuded from the wound with milking of the left foot.  - Full thickness ulceration noted to the lateral aspect of the upper 1/3 aspect of the leg at the level of the fibular notch. + tendon exposed; no drainage with palpation of the periwound area; no malodor exuded from the wound, negative fluctuance and negative crepitus to the area. Full thickness ulceration L fibular notch area noted to be covered with Alleyvn pad.     Imaging:    < from: US Duplex Arterial Lower Ext Compl, Bilateral (07.14.20 @ 14:49) >  IMPRESSION:  No evidence of a hemodynamically significant stenosis or occlusion in the visualized lower extremity arteries.  Subcutaneous edema throughout both lower extremities with fluid collections in both groins.  Monophasic waveforms throughout both lower extremities.  < end of copied text >      < from: TTE Echo Complete w/o Contrast w/ Doppler (06.27.20 @ 08:58) >   Impression   Summary   The mid to apical anterolateral to anterior segments are severely   hypokinetic. The apical inferoseptal wall appears hypokinetic.   Estimated left ventricular ejection fraction is 45-50 %.   A catheter wire is seen in the right atrium.   Mild (1+) tricuspid valve regurgitation is present.   Mild pulmonary hypertension.  < end of copied text >      < from: Xray Ankle Complete 3 Views, Bilateral (07.09.20 @ 16:42) >  EXAM:  XR ANKLE COMP MIN 3 VIEWS BI                        *** ADDENDUM 07/10/2020  ***                                                           Addendum:    The posterior soft tissue gas of the left ankle may be related to skin ulcerationor early fistulous tract formation. Clinical correlation is suggested.  *** END OF ADDENDUM 07/10/2020  ***  PROCEDURE DATE:  07/09/2020    INTERPRETATION:  Bilateral ankles  HISTORY: Bilateral ulcers    Three views of the right ankle and three views of the left ankle show no evidence of fracture nor destructive change. The joint spaces are maintained.  Subcutaneous soft tissue gas is present behind the left ankle.  IMPRESSION: Soft tissue gas as noted.  Thank you for this referral  ***Please see the addendum at the top of this report. It may contain additional important information or changes.****  < end of copied text >

## 2020-07-19 NOTE — PROGRESS NOTE ADULT - ASSESSMENT
40 yo female pmhx IVDA, lupus, RA, epilepsy (last seizure 8 years ago), recent use of snorting heroin (2 bags daily) presented with FTF admitted with anemia, NATALIA, transaminitis, metabolic lactic acidosis, severe dehydration/malnutrition, respiratory failure requiring intubation now s/p trach (7/13), septic shock 2/2 MRSA bacteremia and UTI now fungemic.     s/p PEG   will attempt switch to Trach collar today  ID following  Podiatry following      PLAN:  - Tcollar as tolerated, chest PT  - Celulitis, MRSA bacteremia, cont with Doxy, Fluconazole, follow up ID  - BP control  - monitor electrolytes  - feeds via PEG  - Pain control, Dilaudid, Precedex  - Klonopin 1mg PO BID  - DVT ppx  - Monitor in CCU

## 2020-07-19 NOTE — PROGRESS NOTE ADULT - ASSESSMENT
Assessment: 40 y/o female seen by podiatry at bedside in the CCU for the followin.) Full Thickness Fibronecrotic Ulcer of Right fifth digit  2.) Full Thickness Fibronecrotic Ulcer with exposed peroneal tendon, lateral aspect RLE  3.) Full thickness ulcer with exposed Achilles tendon LT  4.) Full thickness ulcer to lateral malleolus, LLE  5) Full thickness ulceration, dorsum of left 1st MTPJ  6) Partial Thickness ulcer 4th interspace, Lt foot  7) Full thickness ulceration, upper 1/3 aspect of left leg at level of fibular notch  8) Non gradable Eschar of 3rd digit, Lt foot  9) Multiple hyperpigmented scar-like lesions scattered all over LE, bl  10) Serous blister, medial malleolus L side  11) Pain in B/L Lower Extremities     Plan:  - Chart reviewed and patient evaluated by Podiatry team with attending present.  - X-rays of the b/l LEs reviewed at this time. Case discussed with radiologist in depth. Please note oval radiolucencies visualized in the b/l LEs X-rays are due to air in exposed open wounds.   - Final wound Cx. left 1st MTPJ full-thickness ulceration, reviewed  - All wounds to b/l LEs irrigated with copious amounts of sterile saline.  - SSD and adaptic applied over the right dorsolateral forefoot ulceration.   - Saline-moistened Adaptic applied over exposed peroneal tendons to RLE.   - Adaptic applied over exposed Achilles tendon of LLE.   - Silvadene applied to wounds of L 3rd digit and L lateral malleolus ulceration.  - The b/l feet and ankles were wrapped with 4x4 gauze, ABD pad and kerlix, then secured with tape.   - Patient's right and left heels to be offloaded in b/l Z-flex boots to be worn at all times when patient bedbound.  - Vascular Consult, appreciated.   - Discussed case with ID department given purulence exuded from full thickness ulceration to left 1st MPJ and that deep wound cx was taken, Abx per ID appreciated.  - Discussed with CCU doctor the case, and recommendations, appreciated.  - Discussed findings of full-thickness ulceration at level of left fibular notch with Vascular surgery and Wound care nursing departments. Local wound care of full-thickness ulceration at level of left fibular notch per Wound care nursing department in addition to care of patient's sacral ulceration, appreciated.  - No podiatric surgical intervention necessary at this time as no abscess collections found to b/l lower extremities   - At this time, podiatry will provide supportive, and noninvasive wound care to the wounds, b/l lower extremities, given the fact that the patient is deemed in non stable medically and at risk for acute decompensation.  - Nutrition supplementation per Dietician given albumin level decreased, appreciated.   - Podiatry will follow the case closely while in house.

## 2020-07-19 NOTE — PROGRESS NOTE ADULT - SUBJECTIVE AND OBJECTIVE BOX
40 yo f pmhx IVDA, lupus, RA, epilepsy (last seizure 8 years ago), recent use of snorting heroin (2 bags daily) presented with FTF admitted with anemia, NATALIA, transaminitis, metabolic lactic acidosis, severe dehydration/malnutrition, respiratory failure requiring intubation now s/p trach (7/13), septic shock 2/2 MRSA bacteremia and UTI now fungemic.     Patient currently +trachoestomy, s/p PEG placement  started on feeds    7/18: overnight was tachypneic, placed back on pressure support    7/19: placed on T collar, plastics reconsulted for sacral decub        T(C): 36.8 (07-19-20 @ 06:12), Max: 38 (07-18-20 @ 16:00)  HR: 106 (07-19-20 @ 05:00) (103 - 137)  BP: 121/85 (07-19-20 @ 04:00) (57/27 - 134/84)  RR: 13 (07-19-20 @ 05:00) (13 - 28)  SpO2: 100% (07-19-20 @ 05:00) (100% - 100%)  Wt(kg): --Gen: Awake, anxious NAD  HEENT: NCAT, EOMI  Neck: Supple, +trach  CV: nml S1S2, RRR  Lungs: CTABL  Abd: Soft, NT, ND, BS+, +PEG  Ext: +edema, b/l LE wrapped with dressings.  RUE wrapped with dressings as well. Neuro: Non focal   Skin: warm, wounds in dressings  Sacral decub, stage 4                                  9.8    9.16  )-----------( 315      ( 18 Jul 2020 11:52 )             32.2     18 Jul 2020 11:52    136    |  109    |  30     ----------------------------<  90     4.9     |  19     |  0.91     Ca    7.8        18 Jul 2020 11:52        CAPILLARY BLOOD GLUCOSE

## 2020-07-20 LAB
ALBUMIN SERPL ELPH-MCNC: 1.1 G/DL — LOW (ref 3.3–5)
ALP SERPL-CCNC: 128 U/L — HIGH (ref 40–120)
ALT FLD-CCNC: 21 U/L — SIGNIFICANT CHANGE UP (ref 12–78)
ANION GAP SERPL CALC-SCNC: 6 MMOL/L — SIGNIFICANT CHANGE UP (ref 5–17)
AST SERPL-CCNC: 33 U/L — SIGNIFICANT CHANGE UP (ref 15–37)
BILIRUB SERPL-MCNC: 0.3 MG/DL — SIGNIFICANT CHANGE UP (ref 0.2–1.2)
BUN SERPL-MCNC: 24 MG/DL — HIGH (ref 7–23)
CALCIUM SERPL-MCNC: 7.7 MG/DL — LOW (ref 8.5–10.1)
CHLORIDE SERPL-SCNC: 108 MMOL/L — SIGNIFICANT CHANGE UP (ref 96–108)
CO2 SERPL-SCNC: 23 MMOL/L — SIGNIFICANT CHANGE UP (ref 22–31)
CREAT SERPL-MCNC: 0.77 MG/DL — SIGNIFICANT CHANGE UP (ref 0.5–1.3)
GLUCOSE SERPL-MCNC: 86 MG/DL — SIGNIFICANT CHANGE UP (ref 70–99)
HCT VFR BLD CALC: 26.9 % — LOW (ref 34.5–45)
HGB BLD-MCNC: 8.1 G/DL — LOW (ref 11.5–15.5)
MCHC RBC-ENTMCNC: 28.3 PG — SIGNIFICANT CHANGE UP (ref 27–34)
MCHC RBC-ENTMCNC: 30.1 GM/DL — LOW (ref 32–36)
MCV RBC AUTO: 94.1 FL — SIGNIFICANT CHANGE UP (ref 80–100)
PLATELET # BLD AUTO: 553 K/UL — HIGH (ref 150–400)
POTASSIUM SERPL-MCNC: 4.5 MMOL/L — SIGNIFICANT CHANGE UP (ref 3.5–5.3)
POTASSIUM SERPL-SCNC: 4.5 MMOL/L — SIGNIFICANT CHANGE UP (ref 3.5–5.3)
PROT SERPL-MCNC: 5.8 GM/DL — LOW (ref 6–8.3)
RBC # BLD: 2.86 M/UL — LOW (ref 3.8–5.2)
RBC # FLD: 15.1 % — HIGH (ref 10.3–14.5)
SODIUM SERPL-SCNC: 137 MMOL/L — SIGNIFICANT CHANGE UP (ref 135–145)
WBC # BLD: 10.6 K/UL — HIGH (ref 3.8–10.5)
WBC # FLD AUTO: 10.6 K/UL — HIGH (ref 3.8–10.5)

## 2020-07-20 PROCEDURE — 99233 SBSQ HOSP IP/OBS HIGH 50: CPT

## 2020-07-20 PROCEDURE — 99232 SBSQ HOSP IP/OBS MODERATE 35: CPT

## 2020-07-20 RX ADMIN — Medication 0.1 MILLIGRAM(S): at 05:57

## 2020-07-20 RX ADMIN — Medication 650 MILLIGRAM(S): at 17:42

## 2020-07-20 RX ADMIN — OXYCODONE HYDROCHLORIDE 5 MILLIGRAM(S): 5 TABLET ORAL at 14:02

## 2020-07-20 RX ADMIN — OXYCODONE HYDROCHLORIDE 5 MILLIGRAM(S): 5 TABLET ORAL at 22:18

## 2020-07-20 RX ADMIN — OXYCODONE HYDROCHLORIDE 5 MILLIGRAM(S): 5 TABLET ORAL at 00:20

## 2020-07-20 RX ADMIN — OXYCODONE HYDROCHLORIDE 5 MILLIGRAM(S): 5 TABLET ORAL at 05:57

## 2020-07-20 RX ADMIN — Medication 0.5 MILLIGRAM(S): at 13:32

## 2020-07-20 RX ADMIN — OXYCODONE HYDROCHLORIDE 5 MILLIGRAM(S): 5 TABLET ORAL at 13:32

## 2020-07-20 RX ADMIN — Medication 0.1 MILLIGRAM(S): at 13:32

## 2020-07-20 RX ADMIN — ENOXAPARIN SODIUM 40 MILLIGRAM(S): 100 INJECTION SUBCUTANEOUS at 09:34

## 2020-07-20 RX ADMIN — Medication 100 MILLIGRAM(S): at 09:27

## 2020-07-20 RX ADMIN — Medication 50 MILLIGRAM(S): at 09:27

## 2020-07-20 RX ADMIN — CHLORHEXIDINE GLUCONATE 15 MILLILITER(S): 213 SOLUTION TOPICAL at 09:29

## 2020-07-20 RX ADMIN — Medication 0.5 MILLIGRAM(S): at 06:36

## 2020-07-20 RX ADMIN — CHLORHEXIDINE GLUCONATE 15 MILLILITER(S): 213 SOLUTION TOPICAL at 22:19

## 2020-07-20 RX ADMIN — Medication 1 TABLET(S): at 09:27

## 2020-07-20 RX ADMIN — Medication 1 APPLICATION(S): at 09:31

## 2020-07-20 RX ADMIN — Medication 1 MILLIGRAM(S): at 09:27

## 2020-07-20 RX ADMIN — Medication 100 MILLIGRAM(S): at 22:18

## 2020-07-20 RX ADMIN — Medication 1 MILLIGRAM(S): at 22:18

## 2020-07-20 RX ADMIN — Medication 650 MILLIGRAM(S): at 18:30

## 2020-07-20 RX ADMIN — Medication 500 MILLIGRAM(S): at 09:27

## 2020-07-20 RX ADMIN — Medication 5 MILLIGRAM(S): at 22:20

## 2020-07-20 RX ADMIN — OXYCODONE HYDROCHLORIDE 5 MILLIGRAM(S): 5 TABLET ORAL at 06:20

## 2020-07-20 RX ADMIN — Medication 0.1 MILLIGRAM(S): at 22:18

## 2020-07-20 RX ADMIN — Medication 1 APPLICATION(S): at 13:28

## 2020-07-20 NOTE — PROGRESS NOTE ADULT - SUBJECTIVE AND OBJECTIVE BOX
38 yo f pmhx IVDA, lupus, RA, epilepsy (last seizure 8 years ago), recent use of snorting heroin (2 bags daily) presented with FTF admitted with anemia, NATALIA, transaminitis, metabolic lactic acidosis, severe dehydration/malnutrition, respiratory failure requiring intubation now s/p trach (7/13), septic shock 2/2 MRSA bacteremia and UTI now fungemic.     Patient currently +trachoestomy, s/p PEG placement  started on feeds    7/18: overnight was tachypneic, placed back on pressure support    7/19: placed on T collar, plastics reconsulted for sacral decub        Allergies    morphine (Unknown)        ICU Vital Signs Last 24 Hrs  T(C): 36.8 (20 Jul 2020 10:02), Max: 37.6 (19 Jul 2020 23:00)  T(F): 98.3 (20 Jul 2020 10:02), Max: 99.6 (19 Jul 2020 23:00)  HR: 110 (20 Jul 2020 09:00) (109 - 137)  BP: 117/89 (20 Jul 2020 09:00) (111/82 - 136/95)  BP(mean): 94 (20 Jul 2020 09:00) (87 - 105)  RR: 35 (20 Jul 2020 09:00) (22 - 38)  SpO2: 100% (20 Jul 2020 09:00) (100% - 100%)          I&O's Summary    19 Jul 2020 07:01  -  20 Jul 2020 07:00  --------------------------------------------------------  IN: 990 mL / OUT: 200 mL / NET: 790 mL                              8.1    10.60 )-----------( 553      ( 20 Jul 2020 07:59 )             26.9       07-20    137  |  108  |  24<H>  ----------------------------<  86  4.5   |  23  |  0.77    Ca    7.7<L>      20 Jul 2020 07:59    TPro  5.8<L>  /  Alb  1.1<L>  /  TBili  0.3  /  DBili  x   /  AST  33  /  ALT  21  /  AlkPhos  128<H>  07-20      CAPILLARY BLOOD GLUCOSE          LIVER FUNCTIONS - ( 20 Jul 2020 07:59 )  Alb: 1.1 g/dL / Pro: 5.8 gm/dL / ALK PHOS: 128 U/L / ALT: 21 U/L / AST: 33 U/L / GGT: x                               MEDICATIONS  (STANDING):  ascorbic acid 500 milliGRAM(s) Oral daily  chlorhexidine 0.12% Liquid 15 milliLiter(s) Oral Mucosa every 12 hours  clonazePAM  Tablet 1 milliGRAM(s) Oral two times a day  cloNIDine 0.1 milliGRAM(s) Oral every 8 hours  collagenase Ointment 1 Application(s) Topical daily  Dakins Solution - 1/4 Strength 1 Application(s) Topical daily  dexMEDEtomidine Infusion 0.4 MICROgram(s)/kG/Hr (5.44 mL/Hr) IV Continuous <Continuous>  doxycycline hyclate Capsule 100 milliGRAM(s) Oral every 12 hours  enoxaparin Injectable 40 milliGRAM(s) SubCutaneous daily  famotidine    Tablet 20 milliGRAM(s) Oral two times a day  fluconAZOLE IVPB 400 milliGRAM(s) IV Intermittent every 24 hours  folic acid 1 milliGRAM(s) Oral daily  melatonin 5 milliGRAM(s) Oral at bedtime  multivitamin 1 Tablet(s) Oral daily  silver sulfADIAZINE 1% Cream 1 Application(s) Topical daily  thiamine 100 milliGRAM(s) Oral daily    MEDICATIONS  (PRN):  acetaminophen   Tablet .. 650 milliGRAM(s) Oral every 4 hours PRN Temp greater or equal to 38C (100.4F), Mild Pain (1 - 3)  ALPRAZolam 0.5 milliGRAM(s) Oral every 6 hours PRN Anxiety  glucagon  Injectable 1 milliGRAM(s) IntraMuscular once PRN Glucose <70 milliGRAM(s)/deciLiter  hydrOXYzine hydrochloride 50 milliGRAM(s) Oral every 4 hours PRN Anxiety  loperamide 2 milliGRAM(s) Oral every 4 hours PRN Loose stool  oxyCODONE    IR 5 milliGRAM(s) Oral every 6 hours PRN Moderate Pain (4 - 6)          DVT Prophylaxis:    Advanced Directives:  Discussed with:    Visit Information:          minutes of Critical Care time spent providing medical care for patient's acute illness/conditions that impairs at least one vital organ system and/or poses a high risk of imminent or life threatening deterioration in the patient's condition.  It includes time spent reviewing labs, radiology, discussing goals of care with patient and/or family, and discussing the case with a multidisciplinary team in an effort to prevent further life threatening deterioration or end organ damage.  This time is independent of any procedures performed.    ** Time is exclusive of billed procedures and/or teaching and/or routine family updates. 40 yo F admitted 6/24 presented with MRSA bacteremia, anemia, NATALIA, transaminitis, metabolic lactic acidosis, severe dehydration/malnutrition, respiratory failure requiring intubation now s/p trach (7/13), septic shock 2/2 MRSA bacteremia and UTI now fungemic.     Patient currently +trachoestomy, s/p PEG placement  started on feeds    7/18: overnight was tachypneic, placed back on pressure support    7/19: placed on T collar, plastics reconsulted for sacral decub    recent events d/w Dr Lawrence who has been caring for the patient.    7/20: Pt on TC last 24+ hrs - AA and interactive - severely debilitated.  Wants to go in wheelchair.  TF in progress.  Hemodynamics reasonable.    PMHx IVDA, lupus, RA, epilepsy (last seizure 8 years ago), recent use of snorting heroin (2 bags daily)   Allergies    morphine (Unknown)        ICU Vital Signs Last 24 Hrs  T(C): 36.8 (20 Jul 2020 10:02), Max: 37.6 (19 Jul 2020 23:00)  T(F): 98.3 (20 Jul 2020 10:02), Max: 99.6 (19 Jul 2020 23:00)  HR: 110 (20 Jul 2020 09:00) (109 - 137)  BP: 117/89 (20 Jul 2020 09:00) (111/82 - 136/95)  BP(mean): 94 (20 Jul 2020 09:00) (87 - 105)  RR: 35 (20 Jul 2020 09:00) (22 - 38)  SpO2: 100% (20 Jul 2020 09:00) (100% - 100%)          I&O's Summary    19 Jul 2020 07:01  -  20 Jul 2020 07:00  --------------------------------------------------------  IN: 990 mL / OUT: 200 mL / NET: 790 mL                              8.1    10.60 )-----------( 553      ( 20 Jul 2020 07:59 )             26.9       07-20    137  |  108  |  24<H>  ----------------------------<  86  4.5   |  23  |  0.77    Ca    7.7<L>      20 Jul 2020 07:59    TPro  5.8<L>  /  Alb  1.1<L>  /  TBili  0.3  /  DBili  x   /  AST  33  /  ALT  21  /  AlkPhos  128<H>  07-20      CAPILLARY BLOOD GLUCOSE          LIVER FUNCTIONS - ( 20 Jul 2020 07:59 )  Alb: 1.1 g/dL / Pro: 5.8 gm/dL / ALK PHOS: 128 U/L / ALT: 21 U/L / AST: 33 U/L / GGT: x             MEDICATIONS  (STANDING):  ascorbic acid 500 milliGRAM(s) Oral daily  chlorhexidine 0.12% Liquid 15 milliLiter(s) Oral Mucosa every 12 hours  clonazePAM  Tablet 1 milliGRAM(s) Oral two times a day  cloNIDine 0.1 milliGRAM(s) Oral every 8 hours  collagenase Ointment 1 Application(s) Topical daily  Dakins Solution - 1/4 Strength 1 Application(s) Topical daily  dexMEDEtomidine Infusion 0.4 MICROgram(s)/kG/Hr (5.44 mL/Hr) IV Continuous <Continuous>  doxycycline hyclate Capsule 100 milliGRAM(s) Oral every 12 hours  enoxaparin Injectable 40 milliGRAM(s) SubCutaneous daily  famotidine    Tablet 20 milliGRAM(s) Oral two times a day  fluconAZOLE IVPB 400 milliGRAM(s) IV Intermittent every 24 hours  folic acid 1 milliGRAM(s) Oral daily  melatonin 5 milliGRAM(s) Oral at bedtime  multivitamin 1 Tablet(s) Oral daily  silver sulfADIAZINE 1% Cream 1 Application(s) Topical daily  thiamine 100 milliGRAM(s) Oral daily    MEDICATIONS  (PRN):  acetaminophen   Tablet .. 650 milliGRAM(s) Oral every 4 hours PRN Temp greater or equal to 38C (100.4F), Mild Pain (1 - 3)  ALPRAZolam 0.5 milliGRAM(s) Oral every 6 hours PRN Anxiety  glucagon  Injectable 1 milliGRAM(s) IntraMuscular once PRN Glucose <70 milliGRAM(s)/deciLiter  hydrOXYzine hydrochloride 50 milliGRAM(s) Oral every 4 hours PRN Anxiety  loperamide 2 milliGRAM(s) Oral every 4 hours PRN Loose stool  oxyCODONE    IR 5 milliGRAM(s) Oral every 6 hours PRN Moderate Pain (4 - 6)          DVT Prophylaxis: Lovenox, venodynes    Visit Information:  35

## 2020-07-20 NOTE — PROGRESS NOTE ADULT - SUBJECTIVE AND OBJECTIVE BOX
Date of service: 7/20/2020  Chief Complaint: B/L lower extremity wounds     HPI: Pt was examined at bedside by podiatry with attending present, for B/L lower extremity ulcerative lesions. Pt is a 40 yo female who has been admitted to the CCU for septic shock, UTI with MRSA and bacteremia. Noted Pt is currently intubated and non verbal, she is alert and oriented. The patient is also being treated for anemia, acute respiratory failure and toxic metabolic encephalopathy. Of Note, Pt has a history of IVDA drug use and is critically ill and at risk for acute decompensation possible death per the intensivist. Due to the patient being intubated all additional medical history and information was gathered from the patients chart.    7/20: Patient seen and examined by podiatry for follow-up evaluation of right and left lower extremity wounds. Patient noted be arousable at bedside and patient s/p tracheostomy procedure performed on 7/13/2020 and PEG placed on 7/16/2020. Patient noted to be responsive to external stimuli when her lower extremities are evaluated by Podiatry. All further HPI obtained via patient's chart.    REVIEW OF SYSTEMS:  Unable to assess due to pt's inability to communicate per intubation    Allergies:  morphine (Unknown)    Past Medical History:  Lupus, RA, Reynard's Epilepsy, IVDA     Family History:  No pertinent family history in first degree relatives: cannot recall family history at this time    Social History:  lives alone  single  smokes 4 cig/day  uses two bags of heroin a day (24 Jun 2020 01:21)    LABS:  Complete Blood Count in AM (07.20.20 @ 07:59)    WBC Count: 10.60 K/uL    RBC Count: 2.86 M/uL    Hemoglobin: 8.1 g/dL    Hematocrit: 26.9 %    Mean Cell Volume: 94.1 fl    Mean Cell Hemoglobin: 28.3 pg    Mean Cell Hemoglobin Conc: 30.1 gm/dL    Red Cell Distrib Width: 15.1 %    Platelet Count - Automated: 553 K/uL    Basic Metabolic Panel in AM (07.18.20 @ 11:52)    Sodium, Serum: 136 mmol/L    Potassium, Serum: 4.9 mmol/L    Chloride, Serum: 109 mmol/L    Carbon Dioxide, Serum: 19 mmol/L    Anion Gap, Serum: 8 mmol/L    Blood Urea Nitrogen, Serum: 30 mg/dL    Creatinine, Serum: 0.91 mg/dL    Glucose, Serum: 90 mg/dL    Calcium, Total Serum: 7.8 mg/dL    eGFR if Non : 80: Interpretative comment  The units for eGFR are mL/min/1.73M2 (normalized body surface area). The  eGFR is calculated from a serum creatinine using the CKD-EPI equation.  Other variables required for calculation are race, age and sex. Among  patients with chronic kidney disease (CKD), the eGFR is useful in  determining the stage of disease according to KDOQI CKD classification.  All eGFR results are reported numerically with the following  interpretation.          GFR                    With                 Without     (ml/min/1.73 m2)    Kidney Damage       Kidney Damage        >= 90                    Stage 1                     Normal        60-89                    Stage 2                     Decreased GFR        30-59     Stage 3                     Stage 3        15-29                    Stage 4                     Stage 4        < 15                      Stage 5                     Stage 5  Each stage of CKD assumes that the associated GFR level has been in  effect for at least 3 months. Determination of stages one and two (with  eGFR > 59 ml/min/m2) requires estimation of kidney damage for at least 3  months as defined by structural or functional abnormalities.  Limitations: All estimates of GFR will be less accurate for patients at  extremes of muscle mass (including but not limited to frail elderly,  critically ill, or cancer patients), those with unusual diets, and those  with conditions associated with reduced secretion or extrarenal  elimination of creatinine. The eGFR equation is not recommended for use  in patients with unstable creatinine levels. mL/min/1.73M2    eGFR if African American: 93 mL/min/1.73M2    Albumin, Serum: 1.4 g/dL (07.16.20 @ 07:21)    .Other L 1st MPJ deep wound cx  07-13 @ 09:30   Rare Methicillin resistant Staphylococcus aureus  --  Methicillin resistant Staphylococcus aureus    .Blood Blood-Peripheral  07-06 @ 14:30   Growth in aerobic bottle: Brittny dubliniensis  "Due to technical problems, Proteus sp. will Not be reported as part of  the BCID panel until further notice"  ***Blood Panel PCR results on this specimen are available  approximately 3 hours after the Gram stain result.***  Gram stain, PCR, and/or culture results may not always  correspond due to difference in methodologies.  ************************************************************  This PCR assay was performed using [employer].  The following targets are tested for: Enterococcus,  vancomycin resistant enterococci, Listeria monocytogenes,  coagulase negative staphylococci, S. aureus,  methicillin resistant S. aureus, Streptococcus agalactiae  (Group B), S. pneumoniae, S. pyogenes (Group A),  Acinetobacter baumannii, Enterobacter cloacae, E. coli,  Klebsiella oxytoca, K. pneumoniae, Proteus sp.,  Serratia marcescens, Haemophilus influenzae,  Neisseria meningitidis, Pseudomonas aeruginosa, Candida  albicans, C. glabrata, C krusei, C parapsilosis,  C. tropicalis and the KPC resistance gene.  --  Blood Culture PCR  Brittny dubliniensis      .Sputum Sputum trap  06-29 @ 08:29   Normal Respiratory Elsa present  --    No Squamous epithelial cells per low power field  Moderate polymorphonuclear leukocytes per low power field  Moderate Yeast per oil power field    .Urine None  06-24 @ 05:41   >100,000 CFU/ml Methicillin resistant Staphylococcus aureus  <10,000 CFU/ml Normal Urogenital elsa present  --  Methicillin resistant Staphylococcus aureus    .Blood None  06-23 @ 22:22   Growth in aerobic and anaerobic bottles: Methicillin resistant  Staphylococcus aureus  See previous culture [id number]  --  Blood Culture PCR  Methicillin resistant Staphylococcus aureus    Vital Signs Last 24 Hrs  T(C): 36.1 (18 Jul 2020 05:46), Max: 37.4 (17 Jul 2020 18:57)  T(F): 97 (18 Jul 2020 05:46), Max: 99.3 (17 Jul 2020 18:57)  HR: 130 (18 Jul 2020 08:00) (90 - 130)  BP: 139/94 (18 Jul 2020 08:00) (107/74 - 140/96)  BP(mean): 103 (18 Jul 2020 08:00) (79 - 107)  RR: 32 (18 Jul 2020 08:00) (19 - 32)  SpO2: 100% (18 Jul 2020 08:00) (63% - 100%)    Physical examination:  Constitutional: Pt intubated, lying in bed    Focused LE examination:   Vasc: DP and PT pulses non palpable b/l, CFT < 5 x 10, TG: warm to warm  Neuro and MSK: Unable to assess due to patient's condition    Derm:  Noted Multiple hyperpigmented scar-like lesions scattered all over LE, bl    Rt LE  - Full thickness ulcer with exposed peroneal tendons on lateral aspect of distal RLE that + probes to Fibula, no drainage, + undermining all around the clock, + tenderness. Negative crepitus or fluctuance to area of lateral aspect of the distal RLE.  - Rt 5th toe demonstrates a full thickness with less fibronecotic wound bed and evidence of increasing granulation tissue compared to the prior examination, measuring approximately 7.2 x 4.6 x 0.8 cm, - purulence, - malodor, - fluctuance +Probe to 5th metatarsal head, EDL tendon exposed and + undermining from the medial aspect, + tunneling to the 9 hour.     Lt LE:  -Full thickness ulcer round in shape and approx  0.5 cm distal to lateral malleolus, tunneling to 3 and 6 o'clock positions, + undermining all around, - drainage, - fluctuance - malodor, + probe to bone to the lateral malleolus, - purulence; negative crepitus and - fluctuance  - Multiple Full thickness ulcerations with exposed Achilles tendon noted to the posterior surface of the left distal 1/3 aspect of the left leg.  -Swelling and mild edema noted over the medial malleolus of LLE. Intact serous blister noted to the medial malleolar region. Edema noted to the dorsal-lateral aspect of the left fifth MPJ, noted to be mostly likely pressure induced; area of black-blue discoloration noted to the the left fifth sub met 5 region.   -Partial thickness ulcer, 4th interdigital space with presence of dry blood, - probe to bone, - undermining - tunneling  - Non gradable eschar on 3rd digit on Lt foot  - Full thickness ulceration noted to the dorsum of the left 1st MPJ. Positive probing to the left 1st MPJ. Positive purulence exuded from the wound with milking of the left foot.  - Full thickness ulceration noted to the lateral aspect of the upper 1/3 aspect of the leg at the level of the fibular notch. + tendon exposed; no drainage with palpation of the periwound area; no malodor exuded from the wound, negative fluctuance and negative crepitus to the area. Full thickness ulceration L fibular notch area noted to be covered with Alleyvn pad.   - Full thickness ulceration noted to plantar aspect of left foot sub metatarsal 5 region. + Serosanginous drainage noted with milking of the left foot. - fluctuance or crepitus felt to the sub met 5 left foot. No malodor exuded from drainage to the left foot. - probe to bone, - undermining and - tunneling.     Imaging:    < from: US Duplex Arterial Lower Ext Compl, Bilateral (07.14.20 @ 14:49) >  IMPRESSION:  No evidence of a hemodynamically significant stenosis or occlusion in the visualized lower extremity arteries.  Subcutaneous edema throughout both lower extremities with fluid collections in both groins.  Monophasic waveforms throughout both lower extremities.  < end of copied text >      < from: TTE Echo Complete w/o Contrast w/ Doppler (06.27.20 @ 08:58) >   Impression   Summary   The mid to apical anterolateral to anterior segments are severely   hypokinetic. The apical inferoseptal wall appears hypokinetic.   Estimated left ventricular ejection fraction is 45-50 %.   A catheter wire is seen in the right atrium.   Mild (1+) tricuspid valve regurgitation is present.   Mild pulmonary hypertension.  < end of copied text >      < from: Xray Ankle Complete 3 Views, Bilateral (07.09.20 @ 16:42) >  EXAM:  XR ANKLE COMP MIN 3 VIEWS BI                        *** ADDENDUM 07/10/2020  ***                                                           Addendum:    The posterior soft tissue gas of the left ankle may be related to skin ulcerationor early fistulous tract formation. Clinical correlation is suggested.  *** END OF ADDENDUM 07/10/2020  ***  PROCEDURE DATE:  07/09/2020    INTERPRETATION:  Bilateral ankles  HISTORY: Bilateral ulcers    Three views of the right ankle and three views of the left ankle show no evidence of fracture nor destructive change. The joint spaces are maintained.  Subcutaneous soft tissue gas is present behind the left ankle.  IMPRESSION: Soft tissue gas as noted.  Thank you for this referral  ***Please see the addendum at the top of this report. It may contain additional important information or changes.****  < end of copied text >

## 2020-07-20 NOTE — PROGRESS NOTE ADULT - ASSESSMENT
40 yo female pmhx IVDA, lupus, RA, epilepsy (last seizure 8 years ago), recent use of snorting heroin (2 bags daily) presented with FTF admitted with anemia, NATALIA, transaminitis, metabolic lactic acidosis, severe dehydration/malnutrition, respiratory failure requiring intubation now s/p trach (7/13), septic shock 2/2 MRSA bacteremia and UTI now fungemic.     s/p PEG   will attempt switch to Trach collar today  ID following  Podiatry following      PLAN:  - Tcollar as tolerated, chest PT  - Celulitis, MRSA bacteremia, cont with Doxy, Fluconazole, follow up ID  - BP control  - monitor electrolytes  - feeds via PEG  - Pain control, Dilaudid, Precedex  - Klonopin 1mg PO BID  - DVT ppx  - Monitor in CCU 40 yo F admitted 6/24 with MRSA bacteremia, anemia, NATALIA, transaminitis, metabolic lactic acidosis, severe dehydration/malnutrition, respiratory failure requiring intubation   s/p trach & PEG (7/13), septic shock 2/2 MRSA bacteremia and UTI now fungemic.   cachexia and severe protein calorie malnutrition  functional quadriplegia   hemodynamics reasonable  tolerating TC 24*  tolerating TF's    PMHX IVDA, lupus, RA, epilepsy (last seizure 8 years ago), recent use of snorting heroin (2 bags daily)       PLAN at this time is for:  - T collar as tolerated, chest PT  - physical therapy, OOB to chair  - Doxy, Fluconazole, ID input appreciated  - BP control  - monitor electrolytes  - feeds via PEG  - Oxycodone IR; xanax, Klonopin 1mg PO BID  - DVT ppx  - Consider swallow eval in 24* if remains on TC without issue  - Placement planning - Social work involved in case.

## 2020-07-20 NOTE — PROGRESS NOTE ADULT - SUBJECTIVE AND OBJECTIVE BOX
Subjective: No acute events overnight.      Vital Signs:  Vital Signs Last 24 Hrs  T(C): 36.8 (07-20-20 @ 10:02), Max: 37.6 (07-19-20 @ 23:00)  T(F): 98.3 (07-20-20 @ 10:02), Max: 99.6 (07-19-20 @ 23:00)  HR: 110 (07-20-20 @ 09:00) (109 - 137)  BP: 117/89 (07-20-20 @ 09:00) (111/82 - 136/95)  RR: 35 (07-20-20 @ 09:00) (22 - 38)  SpO2: 100% (07-20-20 @ 09:00) (100% - 100%) on trach collar    Telemetry/Alarms:  sinus    Relevant labs, radiology and Medications reviewed                        8.1    10.60 )-----------( 553      ( 20 Jul 2020 07:59 )             26.9     07-20    137  |  108  |  24<H>  ----------------------------<  86  4.5   |  23  |  0.77    Ca    7.7<L>      20 Jul 2020 07:59    TPro  5.8<L>  /  Alb  1.1<L>  /  TBili  0.3  /  DBili  x   /  AST  33  /  ALT  21  /  AlkPhos  128<H>  07-20      MEDICATIONS  (STANDING):  ascorbic acid 500 milliGRAM(s) Oral daily  chlorhexidine 0.12% Liquid 15 milliLiter(s) Oral Mucosa every 12 hours  clonazePAM  Tablet 1 milliGRAM(s) Oral two times a day  cloNIDine 0.1 milliGRAM(s) Oral every 8 hours  collagenase Ointment 1 Application(s) Topical daily  Dakins Solution - 1/4 Strength 1 Application(s) Topical daily  dexMEDEtomidine Infusion 0.4 MICROgram(s)/kG/Hr (5.44 mL/Hr) IV Continuous <Continuous>  doxycycline hyclate Capsule 100 milliGRAM(s) Oral every 12 hours  enoxaparin Injectable 40 milliGRAM(s) SubCutaneous daily  famotidine    Tablet 20 milliGRAM(s) Oral two times a day  fluconAZOLE IVPB 400 milliGRAM(s) IV Intermittent every 24 hours  folic acid 1 milliGRAM(s) Oral daily  melatonin 5 milliGRAM(s) Oral at bedtime  multivitamin 1 Tablet(s) Oral daily  silver sulfADIAZINE 1% Cream 1 Application(s) Topical daily  thiamine 100 milliGRAM(s) Oral daily    MEDICATIONS  (PRN):  acetaminophen   Tablet .. 650 milliGRAM(s) Oral every 4 hours PRN Temp greater or equal to 38C (100.4F), Mild Pain (1 - 3)  ALPRAZolam 0.5 milliGRAM(s) Oral every 6 hours PRN Anxiety  glucagon  Injectable 1 milliGRAM(s) IntraMuscular once PRN Glucose <70 milliGRAM(s)/deciLiter  hydrOXYzine hydrochloride 50 milliGRAM(s) Oral every 4 hours PRN Anxiety  loperamide 2 milliGRAM(s) Oral every 4 hours PRN Loose stool  oxyCODONE    IR 5 milliGRAM(s) Oral every 6 hours PRN Moderate Pain (4 - 6)      Physical exam  Gen: NAD  Neuro: AO x 3  Card: S1 S2  Pulm: Equal bilaterally  Abd: Soft NT ND - PEG site c/d/i  Ext: upper and lower extremities with chronic ulcerations and edema      I&O's Summary    19 Jul 2020 07:01  -  20 Jul 2020 07:00  --------------------------------------------------------  IN: 990 mL / OUT: 200 mL / NET: 790 mL        Assessment  39y Female  w/ PAST MEDICAL & SURGICAL HISTORY:  Smoker  Heroin abuse  H/O Raynaud's syndrome  Rheumatoid arthritis  Lupus  Gall bladder stones  H/O appendicitis  H/O tubal ligation  Patient is a 39y old  Female who presents with a chief complaint of Cellulitis with sepsis, symptomatic anemia. (20 Jul 2020 10:43)    39y Female h/o lupus, RA, epilepsy (last seizure 8 yrs ago), chronic pain, IVDA admitted w weakness, failure to thrive. Found to have acute anemia, NAATLIA, transaminitis, metabolic lactic acidosis, severe dehydration, severe protein calorie malnutrition. And severe sepsis with septic shock secondary to MRSA bacteremia and UTI treated with a two-week course of Vancomycin, developed worsening respiratory failure and was ultimately intubated on 6/27/20.  s/p tracheostomy 7/13 currently being treated for fungemia.  S/P PEG placement on 7/16/20.    Plan:    Trach collar as tolerated.  TF to goal.  Antibiotics as per ID.  Other care as per CCU.  	    Discussed with Cardiothoracic Team at AM rounds.

## 2020-07-20 NOTE — PROGRESS NOTE ADULT - ASSESSMENT
Assessment: 40 y/o female seen by podiatry at bedside in the CCU for the followin.) Full Thickness Fibronecrotic Ulcer of Right fifth digit  2.) Full Thickness Fibronecrotic Ulcer with exposed peroneal tendon, lateral aspect RLE  3.) Full thickness ulcer with exposed Achilles tendon LT  4.) Full thickness ulcer to lateral malleolus, LLE  5) Full thickness ulceration, dorsum of left 1st MTPJ  6) Partial Thickness ulcer 4th interspace, Lt foot  7) Full thickness ulceration, upper 1/3 aspect of left leg at level of fibular notch  8) Non gradable Eschar of 3rd digit, Lt foot  9) Multiple hyperpigmented scar-like lesions scattered all over LE, bl  10) Serous blister, medial malleolus L side  11) Full thickness ulceration, left foot plantar sub metatarsal 5 region  12) Pain in B/L Lower Extremities     Plan:  - Chart reviewed and patient evaluated by Podiatry team.   - X-rays of the b/l LEs reviewed at this time. Case discussed with radiologist in depth. Please note oval radiolucencies visualized in the b/l LEs X-rays are due to air in exposed open wounds.   - Final wound Cx. left 1st MTPJ full-thickness ulceration, reviewed  - All wounds to b/l LEs irrigated with copious amounts of sterile saline.  - SSD and adaptic applied over the right dorsolateral forefoot ulceration.   - Saline-moistened Adaptic applied over exposed peroneal tendons to RLE.   - Adaptic applied over exposed Achilles tendon of LLE.   - Silvadene applied to wounds of L 3rd digit and L lateral malleolus ulceration.  - Silvadene applied to full-thickness ulceration left foot submet 5 region.   - The b/l feet and ankles were wrapped with 4x4 gauze, ABD pad and kerlix, then secured with tape.   - Patient's right and left heels to be offloaded in b/l Z-flex boots to be worn at all times when patient bedbound.  - Vascular Consult, appreciated.   - Discussed case with ID department given purulence exuded from full thickness ulceration to left 1st MPJ and that deep wound cx was taken, Abx per ID appreciated.  - Discussed with CCU doctor the case, and recommendations, appreciated.  - Discussed findings of full-thickness ulceration at level of left fibular notch with Vascular surgery and Wound care nursing departments. Local wound care of full-thickness ulceration at level of left fibular notch per Wound care nursing department in addition to care of patient's sacral ulceration, appreciated.  - No podiatric surgical intervention necessary at this time as no abscess collections found to b/l lower extremities   - At this time, podiatry will provide supportive, and noninvasive wound care to the wounds, b/l lower extremities, given the fact that the patient is deemed in non stable medically and at risk for acute decompensation.  - Nutrition supplementation per Dietician given albumin level decreased, appreciated.   - Podiatry will follow the case closely while in house.

## 2020-07-21 PROCEDURE — 99232 SBSQ HOSP IP/OBS MODERATE 35: CPT

## 2020-07-21 RX ADMIN — Medication 1 MILLIGRAM(S): at 10:23

## 2020-07-21 RX ADMIN — Medication 1 TABLET(S): at 10:23

## 2020-07-21 RX ADMIN — Medication 50 MILLIGRAM(S): at 10:23

## 2020-07-21 RX ADMIN — OXYCODONE HYDROCHLORIDE 5 MILLIGRAM(S): 5 TABLET ORAL at 13:42

## 2020-07-21 RX ADMIN — Medication 650 MILLIGRAM(S): at 17:15

## 2020-07-21 RX ADMIN — Medication 0.5 MILLIGRAM(S): at 02:15

## 2020-07-21 RX ADMIN — Medication 0.1 MILLIGRAM(S): at 13:42

## 2020-07-21 RX ADMIN — OXYCODONE HYDROCHLORIDE 5 MILLIGRAM(S): 5 TABLET ORAL at 23:25

## 2020-07-21 RX ADMIN — Medication 0.1 MILLIGRAM(S): at 05:23

## 2020-07-21 RX ADMIN — OXYCODONE HYDROCHLORIDE 5 MILLIGRAM(S): 5 TABLET ORAL at 14:12

## 2020-07-21 RX ADMIN — Medication 1 APPLICATION(S): at 12:45

## 2020-07-21 RX ADMIN — CHLORHEXIDINE GLUCONATE 15 MILLILITER(S): 213 SOLUTION TOPICAL at 10:25

## 2020-07-21 RX ADMIN — Medication 5 MILLIGRAM(S): at 23:24

## 2020-07-21 RX ADMIN — Medication 100 MILLIGRAM(S): at 10:23

## 2020-07-21 RX ADMIN — Medication 1 APPLICATION(S): at 16:33

## 2020-07-21 RX ADMIN — ENOXAPARIN SODIUM 40 MILLIGRAM(S): 100 INJECTION SUBCUTANEOUS at 10:39

## 2020-07-21 RX ADMIN — Medication 500 MILLIGRAM(S): at 10:23

## 2020-07-21 RX ADMIN — CHLORHEXIDINE GLUCONATE 15 MILLILITER(S): 213 SOLUTION TOPICAL at 23:26

## 2020-07-21 RX ADMIN — Medication 0.1 MILLIGRAM(S): at 23:25

## 2020-07-21 RX ADMIN — Medication 1 MILLIGRAM(S): at 23:24

## 2020-07-21 RX ADMIN — Medication 650 MILLIGRAM(S): at 16:41

## 2020-07-21 RX ADMIN — Medication 100 MILLIGRAM(S): at 23:24

## 2020-07-21 NOTE — PROGRESS NOTE ADULT - SUBJECTIVE AND OBJECTIVE BOX
40 yo F admitted 6/24 presented with MRSA bacteremia, anemia, NATALIA, transaminitis, metabolic lactic acidosis, severe dehydration/malnutrition, respiratory failure requiring intubation now s/p trach (7/13), septic shock 2/2 MRSA bacteremia and UTI now fungemic.     Patient currently +trachoestomy, s/p PEG placement  started on feeds    7/18: overnight was tachypneic, placed back on pressure support    7/19: placed on T collar, plastics reconsulted for sacral decub    recent events d/w Dr Lawrence who has been caring for the patient.    7/20: Pt on TC last 24+ hrs - AA and interactive - severely debilitated.  Wants to go in wheelchair.  TF in progress.  Hemodynamics reasonable.  7/21: Pt on TC last 48+ hrs - AA and interactive - No new issues, No overnight events.    PMHx IVDA, lupus, RA, epilepsy (last seizure 8 years ago), recent use of snorting heroin (2 bags daily)   Allergies        Allergies    morphine (Unknown)      ICU Vital Signs Last 24 Hrs  T(C): 37.2 (21 Jul 2020 04:00), Max: 37.2 (21 Jul 2020 04:00)  T(F): 98.9 (21 Jul 2020 04:00), Max: 98.9 (21 Jul 2020 04:00)  HR: 122 (21 Jul 2020 10:00) (101 - 127)  BP: 175/92 (21 Jul 2020 10:00) (109/81 - 175/92)  BP(mean): 110 (21 Jul 2020 10:00) (84 - 110)  RR: 39 (21 Jul 2020 10:00) (22 - 41)  SpO2: 100% (21 Jul 2020 10:00) (99% - 100%)          I&O's Summary    20 Jul 2020 07:01  -  21 Jul 2020 07:00  --------------------------------------------------------  IN: 1005 mL / OUT: 1900 mL / NET: -895 mL                              8.1    10.60 )-----------( 553      ( 20 Jul 2020 07:59 )             26.9       07-20    137  |  108  |  24<H>  ----------------------------<  86  4.5   |  23  |  0.77    Ca    7.7<L>      20 Jul 2020 07:59    TPro  5.8<L>  /  Alb  1.1<L>  /  TBili  0.3  /  DBili  x   /  AST  33  /  ALT  21  /  AlkPhos  128<H>  07-20      LIVER FUNCTIONS - ( 20 Jul 2020 07:59 )  Alb: 1.1 g/dL / Pro: 5.8 gm/dL / ALK PHOS: 128 U/L / ALT: 21 U/L / AST: 33 U/L / GGT: x               MEDICATIONS  (STANDING):  ascorbic acid 500 milliGRAM(s) Oral daily  chlorhexidine 0.12% Liquid 15 milliLiter(s) Oral Mucosa every 12 hours  clonazePAM  Tablet 1 milliGRAM(s) Oral two times a day  cloNIDine 0.1 milliGRAM(s) Oral every 8 hours  collagenase Ointment 1 Application(s) Topical daily  Dakins Solution - 1/4 Strength 1 Application(s) Topical daily  doxycycline hyclate Capsule 100 milliGRAM(s) Oral every 12 hours  enoxaparin Injectable 40 milliGRAM(s) SubCutaneous daily  famotidine    Tablet 20 milliGRAM(s) Oral two times a day  fluconAZOLE IVPB 400 milliGRAM(s) IV Intermittent every 24 hours  folic acid 1 milliGRAM(s) Oral daily  melatonin 5 milliGRAM(s) Oral at bedtime  multivitamin 1 Tablet(s) Oral daily  silver sulfADIAZINE 1% Cream 1 Application(s) Topical daily  thiamine 100 milliGRAM(s) Oral daily    MEDICATIONS  (PRN):  acetaminophen   Tablet .. 650 milliGRAM(s) Oral every 4 hours PRN Temp greater or equal to 38C (100.4F), Mild Pain (1 - 3)  ALPRAZolam 0.5 milliGRAM(s) Oral every 6 hours PRN Anxiety  glucagon  Injectable 1 milliGRAM(s) IntraMuscular once PRN Glucose <70 milliGRAM(s)/deciLiter  hydrOXYzine hydrochloride 50 milliGRAM(s) Oral every 4 hours PRN Anxiety  loperamide 2 milliGRAM(s) Oral every 4 hours PRN Loose stool  oxyCODONE    IR 5 milliGRAM(s) Oral every 6 hours PRN Moderate Pain (4 - 6)      Review of Systems:   · Additional ROS	pt offers no complaints/no pain but grossly unable to follow/participate in ROS due to non verbal state	    Physical Exam:   · Constitutional	detailed exam	  · Constitutional Details	cachectic	  · Constitutional Comments	Pale, chronically ill appearing	  · Eyes	detailed exam	  · Eyes Details	PERRL; EOMI	  · ENMT	detailed exam	  · ENMT Details	mouth	  · Mouth	moist	  · Neck	detailed exam 	  · Neck Details	Trach in situ	  · Back	detailed exam 	  · Back Details	normal shape	  · Respiratory	detailed exam	  · Respiratory Details	airway patent; breath sounds equal; good air movement; respirations non-labored	  · Cardiovascular	detailed exam	  · Cardiovascular Details	regular rate and rhythm 	  · Gastrointestinal	detailed exam	  · GI Normal	soft; nontender; no distention; bowel sounds normal	  · Gastrointestinal Comments	PEG in situ	  · Extremities	detailed exam 	  · Extremities Details	no cyanosis	  · Extremities Comments	extensive scarring from injection sites	  · Vascular	Equal and normal pulses (carotid, femoral, dorsalis pedis) 	  · Skin	detailed exam	  · Skin Details	warm and dry	  · Musculoskeletal	detailed exam	  · Musculoskeletal Details	normal strength	  · Psychiatric	detailed exam	  · Psychiatric Details	normal affect; normal behavior	  · Additional PE	UE's/Hands both extensively swollen/edematous, extensive track marks on UEs/LE's B/L	      DVT Prophylaxis: Lovenox, venodynes    Visit Information:  35

## 2020-07-21 NOTE — PROGRESS NOTE ADULT - ASSESSMENT
38 yo F admitted 6/24 with MRSA bacteremia, anemia, NATALIA, transaminitis, metabolic lactic acidosis, severe dehydration/malnutrition, respiratory failure requiring intubation   s/p trach & PEG (7/13), septic shock 2/2 MRSA bacteremia and UTI now fungemic.   cachexia and severe protein calorie malnutrition  functional quadriplegia   hemodynamics reasonable  tolerating TC >48*  tolerating TF's    PMHX IVDA, lupus, RA, epilepsy (last seizure 8 years ago), recent use of snorting heroin (2 bags daily)       PLAN at this time is for:  - T collar as tolerated, chest PT  - physical therapy, OOB to chair  - Doxy, Fluconazole, ID input appreciated  - BP control  - monitor electrolytes  - feeds via PEG  - Oxycodone IR; xanax, Klonopin 1mg PO BID  - DVT ppx  - Consider swallow eval in 24* if remains on TC without issue  - Placement planning - Social work involved in case.        Attending Attestation:   30 minutes spent on total encounter; more than 50% of the visit was spent counseling and/or coordinating care by the attending physician.

## 2020-07-21 NOTE — PROGRESS NOTE ADULT - SUBJECTIVE AND OBJECTIVE BOX
Subjective: No events over night.    Vital Signs:  Vital Signs Last 24 Hrs  T(C): 37.2 (07-21-20 @ 04:00), Max: 37.2 (07-21-20 @ 04:00)  T(F): 98.9 (07-21-20 @ 04:00), Max: 98.9 (07-21-20 @ 04:00)  HR: 122 (07-21-20 @ 10:00) (101 - 127)  BP: 175/92 (07-21-20 @ 10:00) (109/81 - 175/92)  RR: 39 (07-21-20 @ 10:00) (22 - 41)  SpO2: 100% (07-21-20 @ 10:00) (99% - 100%) on (O2)    Telemetry/Alarms: sinus    Relevant labs, radiology and Medications reviewed                        8.1    10.60 )-----------( 553      ( 20 Jul 2020 07:59 )             26.9     07-20    137  |  108  |  24<H>  ----------------------------<  86  4.5   |  23  |  0.77    Ca    7.7<L>      20 Jul 2020 07:59    TPro  5.8<L>  /  Alb  1.1<L>  /  TBili  0.3  /  DBili  x   /  AST  33  /  ALT  21  /  AlkPhos  128<H>  07-20      MEDICATIONS  (STANDING):  ascorbic acid 500 milliGRAM(s) Oral daily  chlorhexidine 0.12% Liquid 15 milliLiter(s) Oral Mucosa every 12 hours  clonazePAM  Tablet 1 milliGRAM(s) Oral two times a day  cloNIDine 0.1 milliGRAM(s) Oral every 8 hours  collagenase Ointment 1 Application(s) Topical daily  Dakins Solution - 1/4 Strength 1 Application(s) Topical daily  doxycycline hyclate Capsule 100 milliGRAM(s) Oral every 12 hours  enoxaparin Injectable 40 milliGRAM(s) SubCutaneous daily  famotidine    Tablet 20 milliGRAM(s) Oral two times a day  fluconAZOLE IVPB 400 milliGRAM(s) IV Intermittent every 24 hours  folic acid 1 milliGRAM(s) Oral daily  melatonin 5 milliGRAM(s) Oral at bedtime  multivitamin 1 Tablet(s) Oral daily  silver sulfADIAZINE 1% Cream 1 Application(s) Topical daily  thiamine 100 milliGRAM(s) Oral daily    MEDICATIONS  (PRN):  acetaminophen   Tablet .. 650 milliGRAM(s) Oral every 4 hours PRN Temp greater or equal to 38C (100.4F), Mild Pain (1 - 3)  ALPRAZolam 0.5 milliGRAM(s) Oral every 6 hours PRN Anxiety  glucagon  Injectable 1 milliGRAM(s) IntraMuscular once PRN Glucose <70 milliGRAM(s)/deciLiter  hydrOXYzine hydrochloride 50 milliGRAM(s) Oral every 4 hours PRN Anxiety  loperamide 2 milliGRAM(s) Oral every 4 hours PRN Loose stool  oxyCODONE    IR 5 milliGRAM(s) Oral every 6 hours PRN Moderate Pain (4 - 6)      Physical exam  Gen: NAD  Neuro: AO x 3  Card: S1 S2  Pulm: CTA  Abd: soft NT ND  Ext: all 4 extremities with edema      I&O's Summary    20 Jul 2020 07:01  -  21 Jul 2020 07:00  --------------------------------------------------------  IN: 1005 mL / OUT: 1900 mL / NET: -895 mL        Assessment  39y Female  w/ PAST MEDICAL & SURGICAL HISTORY:  Smoker  Heroin abuse  H/O Raynaud's syndrome  Rheumatoid arthritis  Lupus  Gall bladder stones  H/O appendicitis  H/O tubal ligation    Patient is a 39y old  Female who presents with a chief complaint of Cellulitis with sepsis, symptomatic anemia. (21 Jul 2020 10:18)    39y Female h/o lupus, RA, epilepsy (last seizure 8 yrs ago), chronic pain, IVDA admitted w weakness, failure to thrive. Found to have acute anemia, NATALIA, transaminitis, metabolic lactic acidosis, severe dehydration, severe protein calorie malnutrition. And severe sepsis with septic shock secondary to MRSA bacteremia and UTI treated with a two-week course of Vancomycin, developed worsening respiratory failure and was ultimately intubated on 6/27/20.  s/p tracheostomy 7/13 currently being treated for fungemia.  S/P PEG placement on 7/16/20.    Plan:    Trach collar as tolerated.  TF to goal.  Antibiotics as per ID.  Other care as per CCU.  	    Discussed with Cardiothoracic Team at AM rounds.

## 2020-07-21 NOTE — PROGRESS NOTE ADULT - SUBJECTIVE AND OBJECTIVE BOX
Date of service: 7/21/2020  Chief Complaint: B/L lower extremity wounds     HPI: Pt was examined at bedside by podiatry with attending present, for B/L lower extremity ulcerative lesions. Pt is a 38 yo female who has been admitted to the CCU for septic shock, UTI with MRSA and bacteremia. Noted Pt is currently intubated and non verbal, she is alert and oriented. The patient is also being treated for anemia, acute respiratory failure and toxic metabolic encephalopathy. Of Note, Pt has a history of IVDA drug use and is critically ill and at risk for acute decompensation possible death per the intensivist. Due to the patient being intubated all additional medical history and information was gathered from the patients chart.    7/21: Patient seen and examined by podiatry for follow-up evaluation of right and left lower extremity wounds. Patient noted be arousable at bedside and patient s/p tracheostomy procedure performed on 7/13/2020 and PEG placed on 7/16/2020. Patient noted to be responsive to external stimuli when her lower extremities are evaluated by Podiatry. All further HPI obtained via patient's chart.    REVIEW OF SYSTEMS:  Unable to assess due to pt's inability to communicate per intubation    Allergies:  morphine (Unknown)    Past Medical History:  Lupus, RA, Reynard's Epilepsy, IVDA     Family History:  No pertinent family history in first degree relatives: cannot recall family history at this time    Social History:  lives alone  single  smokes 4 cig/day  uses two bags of heroin a day (24 Jun 2020 01:21)    Vitals  T(C): 37.2 (21 Jul 2020 04:00), Max: 37.2 (21 Jul 2020 04:00)  T(F): 98.9 (21 Jul 2020 04:00), Max: 98.9 (21 Jul 2020 04:00)  HR: 123 (21 Jul 2020 12:00) (101 - 127)  BP: 129/93 (21 Jul 2020 12:00) (109/81 - 175/92)  BP(mean): 101 (21 Jul 2020 12:00) (84 - 110)  RR: 38 (21 Jul 2020 12:00) (23 - 41)  SpO2: 100% (21 Jul 2020 12:00) (99% - 100%)      Physical examination:  Constitutional: Pt intubated, lying in bed    Focused LE examination:   Vasc: DP and PT pulses non palpable b/l, CFT < 5 x 10, TG: warm to warm  Neuro and MSK: Unable to assess due to patient's condition    Derm:  Noted Multiple hyperpigmented scar-like lesions scattered all over LE, bl    Rt LE  - Full thickness ulcer with exposed peroneal tendons on lateral aspect of distal RLE that + probes to Fibula, no drainage, + undermining all around the clock, + tenderness. Negative crepitus or fluctuance to area of lateral aspect of the distal RLE.  - Rt 5th toe demonstrates a full thickness with less fibronecotic wound bed and evidence of increasing granulation tissue compared to the prior examination, measuring approximately 7.2 x 4.6 x 0.8 cm, - purulence, - malodor, - fluctuance +Probe to 5th metatarsal head, EDL tendon exposed and + undermining from the medial aspect, + tunneling to the 9 hour.     Lt LE:  -Full thickness ulcer round in shape and approx  0.5 cm distal to lateral malleolus, tunneling to 3 and 6 o'clock positions, + undermining all around, - drainage, - fluctuance - malodor, + probe to bone to the lateral malleolus, - purulence; negative crepitus and - fluctuance  - Multiple Full thickness ulcerations with exposed Achilles tendon noted to the posterior surface of the left distal 1/3 aspect of the left leg.  -Swelling and mild edema noted over the medial malleolus of LLE. Intact serous blister noted to the medial malleolar region. Edema noted to the dorsal-lateral aspect of the left fifth MPJ, noted to be mostly likely pressure induced; area of black-blue discoloration noted to the the left fifth sub met 5 region.   -Partial thickness ulcer, 4th interdigital space with presence of dry blood, - probe to bone, - undermining - tunneling  - Non gradable eschar on 3rd digit on Lt foot  - Full thickness ulceration noted to the dorsum of the left 1st MPJ. Positive probing to the left 1st MPJ. Positive purulence exuded from the wound with milking of the left foot.  - Full thickness ulceration noted to the lateral aspect of the upper 1/3 aspect of the leg at the level of the fibular notch. + tendon exposed; no drainage with palpation of the periwound area; no malodor exuded from the wound, negative fluctuance and negative crepitus to the area. Full thickness ulceration L fibular notch area noted to be covered with Alleyvn pad.   - Full thickness ulceration noted to plantar aspect of left foot sub metatarsal 5 region. + Serous drainage noted with milking of the left foot. - fluctuance or crepitus felt to the sub met 5 left foot. No malodor exuded from drainage to the left foot. - probe to bone, - undermining and - tunneling.       Labs:                        8.1    10.60 )-----------( 553      ( 20 Jul 2020 07:59 )             26.9   07-20    137  |  108  |  24<H>----------------------------<  86  4.5   |  23   |  0.77    Ca    7.7<L>      20 Jul 2020 07:59    TPro  5.8<L>  /  Alb  1.1<L>  /  TBili  0.3  /  DBili  x   /  AST  33  /  ALT  21  /  AlkPhos  128<H>  07-20    Culture - Blood in AM (07.06.20 @ 14:30)    Gram Stain:   Growth in aerobic bottle: Yeast like cells    -  5-Flucytosine: N/I For Candida species, no interpretation available for any invitro susceptibility testing.    -  Amphotericin B: N/I 0.25    -  Andulafungin: N/I 0.12    -  Caspofungin: N/I 0.06 CASPOFUNGIN: is indicated in the treatment of candidemia, intra-abdominal abscess, peritonitis, pleural space infections and esophageal candidiasis.    -  Fluconazole: N/I <=0.12 Fluconazole = Data established for mucosal disease, and is generally applicable for invasive disease.  Susceptibility is dependent on achieving the maximal possible blood level.  Doses of 400 MG/day or more may be required in adults with normal renalfunction and body habitus.    -  Itraconazole: N/I For Candida species, no interpretation available for any invitro susceptibility testing.    -  Micafungin: N/I 0.015    -  Posaconazole: N/I 0.015    -  Voriconazole: N/I <=0.008 VORICONAZOLE: The KRYSTEN has been determined by the colormetric microdilution method.    -  Multidrug (KPC pos) resistant organism: Nondet    -  Staphylococcus aureus: Nondet    -  Methicillin resistant Staphylococcus aureus (MRSA): Nondet    -  Coagulase negative Staphylococcus: Nondet    -  Enterococcus species: Nondet    -  Vancomycin resistant Enterococcus sp.: Nondet    -  Escherichia coli: Nondet    -  Klebsiella oxytoca: Nondet    -  Klebsiella pneumoniae: Nondet    -  Serratia marcescens: Nondet    -  Haemophilus influenzae: Nondet    -  Listeria monocytogenes: Nondet    -  Neisseria meningitidis: Nondet    -  Pseudomonas aeruginosa: Nondet    -  Acinetobacter baumanii: Nondet    -  Enterobacter cloacae complex: Nondet    -  Streptococcus sp. (Not Grp A, B or S pneumoniae): Nondet    -  Streptococcus agalactiae (Group B): Nondet    -  Streptococcus pyogenes (Group A): Nondet    -  Streptococcus pneumoniae: Nondet    -  Candida albicans: Nondet    -  Candida glabrata: Nondet    -  Candida krusei: Nondet    -  Candida parapsilosis: Nondet    -  Candida tropicalis: Nondet    -  Interpretation: See note This test method is not approved by the FDA and is for research use only.  The performance characteristics of this assay may have been determined by [email or web address] and [facility], [place], [place]                            N/I - No interpretation available                                                         SDD – Sensitive Dose Dependent    Specimen Source: .Blood Blood-Peripheral    Organism: Blood Culture PCR    Organism: Brittny dubliniensis    Culture Results:   Growth in aerobic bottle: Brittny dubliniensis  "Due to technical problems, Proteus sp. will Not be reported as part of  the BCID panel until further notice"  ***Blood Panel PCR results on this specimen are available  approximately 3 hours after the Gram stain result.***  Gram stain, PCR, and/or culture results may not always  correspond due to difference in methodologies.  ************************************************************  This PCR assay was performed using [employer].  The following targets are tested for: Enterococcus,  vancomycin resistant enterococci, Listeria monocytogenes,  coagulase negative staphylococci, S. aureus,  methicillin resistant S. aureus, Streptococcus agalactiae  (Group B), S. pneumoniae, S. pyogenes (Group A),  Acinetobacter baumannii, Enterobacter cloacae, E. coli,  Klebsiella oxytoca, K. pneumoniae, Proteus sp.,  Serratia marcescens, Haemophilus influenzae,  Neisseria meningitidis, Pseudomonas aeruginosa, Candida  albicans, C. glabrata, C krusei, C parapsilosis,  C. tropicalis and the KPC resistance gene.    Organism Identification: Blood Culture PCR  Candida dubliniensis    Method Type: PCR    Method Type: YSTMIC      Imaging:    < from: US Duplex Arterial Lower Ext Compl, Bilateral (07.14.20 @ 14:49) >  IMPRESSION:  No evidence of a hemodynamically significant stenosis or occlusion in the visualized lower extremity arteries.  Subcutaneous edema throughout both lower extremities with fluid collections in both groins.  Monophasic waveforms throughout both lower extremities.  < end of copied text >      < from: TTE Echo Complete w/o Contrast w/ Doppler (06.27.20 @ 08:58) >   Impression   Summary   The mid to apical anterolateral to anterior segments are severely   hypokinetic. The apical inferoseptal wall appears hypokinetic.   Estimated left ventricular ejection fraction is 45-50 %.   A catheter wire is seen in the right atrium.   Mild (1+) tricuspid valve regurgitation is present.   Mild pulmonary hypertension.  < end of copied text >      < from: Xray Ankle Complete 3 Views, Bilateral (07.09.20 @ 16:42) >  EXAM:  XR ANKLE COMP MIN 3 VIEWS BI                        *** ADDENDUM 07/10/2020  ***                                                           Addendum:    The posterior soft tissue gas of the left ankle may be related to skin ulcerationor early fistulous tract formation. Clinical correlation is suggested.  *** END OF ADDENDUM 07/10/2020  ***  PROCEDURE DATE:  07/09/2020    INTERPRETATION:  Bilateral ankles  HISTORY: Bilateral ulcers    Three views of the right ankle and three views of the left ankle show no evidence of fracture nor destructive change. The joint spaces are maintained.  Subcutaneous soft tissue gas is present behind the left ankle.  IMPRESSION: Soft tissue gas as noted.  Thank you for this referral  ***Please see the addendum at the top of this report. It may contain additional important information or changes.****  < end of copied text >

## 2020-07-21 NOTE — PROGRESS NOTE ADULT - ASSESSMENT
Assessment: 38 y/o female seen by podiatry at bedside in the CCU for the followin.) Full Thickness Fibronecrotic Ulcer of Right fifth digit  2.) Full Thickness Fibronecrotic Ulcer with exposed peroneal tendon, lateral aspect RLE  3.) Full thickness ulcer with exposed Achilles tendon LT  4.) Full thickness ulcer to lateral malleolus, LLE  5) Full thickness ulceration, dorsum of left 1st MTPJ  6) Partial Thickness ulcer 4th interspace, Lt foot  7) Full thickness ulceration, upper 1/3 aspect of left leg at level of fibular notch  8) Non gradable Eschar of 3rd digit, Lt foot  9) Multiple hyperpigmented scar-like lesions scattered all over LE, bl  10) Serous blister, medial malleolus L side  11) Full thickness ulceration, left foot plantar sub metatarsal 5 region  12) Pain in B/L Lower Extremities     Plan:  - Chart reviewed and patient evaluated by Podiatry team.   - X-rays of the b/l LEs reviewed at this time. Case discussed with radiologist in depth. Please note oval radiolucencies visualized in the b/l LEs X-rays are due to air in exposed open wounds.   - Final wound Cx. left 1st MTPJ full-thickness ulceration, reviewed  - All wounds to b/l LEs irrigated with copious amounts of sterile saline.  - SSD and adaptic applied over the right dorsolateral forefoot ulceration.   - Saline-moistened Adaptic applied over exposed peroneal tendons to RLE.   - Adaptic applied over exposed Achilles tendon of LLE.   - Silvadene applied to wounds of L 3rd digit and L lateral malleolus ulceration.  - Silvadene applied to full-thickness ulceration left foot submet 5 region.   - The b/l feet and ankles were wrapped with 4x4 gauze, ABD pad and kerlix, then secured with tape.   - Patient's right and left heels to be offloaded in b/l Z-flex boots to be worn at all times when patient bedbound.  - Vascular Consult, appreciated.   - Discussed case with ID department given purulence exuded from full thickness ulceration to left 1st MPJ and that deep wound cx was taken, Abx per ID appreciated.  - Discussed with CCU doctor the case, and recommendations, appreciated.  - Discussed findings of full-thickness ulceration at level of left fibular notch with Vascular surgery and Wound care nursing departments. Local wound care of full-thickness ulceration at level of left fibular notch per Wound care nursing department in addition to care of patient's sacral ulceration, appreciated.  - No podiatric surgical intervention necessary at this time as no abscess collections found to b/l lower extremities   - At this time, podiatry will provide supportive, and noninvasive wound care to the wounds, b/l lower extremities, given the fact that the patient is deemed in non stable medically and at risk for acute decompensation.  - Nutrition supplementation per Dietician given albumin level decreased, appreciated.   - Podiatry will follow the case closely while in house.

## 2020-07-22 PROCEDURE — 71045 X-RAY EXAM CHEST 1 VIEW: CPT | Mod: 26

## 2020-07-22 PROCEDURE — 99232 SBSQ HOSP IP/OBS MODERATE 35: CPT

## 2020-07-22 RX ADMIN — Medication 0.5 MILLIGRAM(S): at 02:24

## 2020-07-22 RX ADMIN — Medication 100 MILLIGRAM(S): at 14:38

## 2020-07-22 RX ADMIN — Medication 500 MILLIGRAM(S): at 10:07

## 2020-07-22 RX ADMIN — Medication 1 MILLIGRAM(S): at 10:07

## 2020-07-22 RX ADMIN — Medication 1 APPLICATION(S): at 10:27

## 2020-07-22 RX ADMIN — Medication 0.1 MILLIGRAM(S): at 16:07

## 2020-07-22 RX ADMIN — Medication 1 TABLET(S): at 10:07

## 2020-07-22 RX ADMIN — Medication 1 MILLIGRAM(S): at 10:13

## 2020-07-22 RX ADMIN — ENOXAPARIN SODIUM 40 MILLIGRAM(S): 100 INJECTION SUBCUTANEOUS at 10:07

## 2020-07-22 RX ADMIN — Medication 650 MILLIGRAM(S): at 22:15

## 2020-07-22 RX ADMIN — Medication 100 MILLIGRAM(S): at 10:07

## 2020-07-22 RX ADMIN — CHLORHEXIDINE GLUCONATE 15 MILLILITER(S): 213 SOLUTION TOPICAL at 14:37

## 2020-07-22 NOTE — PROGRESS NOTE ADULT - SUBJECTIVE AND OBJECTIVE BOX
CC:  Patient is a 39y old  Female who presents with a chief complaint of Cellulitis with sepsis, symptomatic anemia. (22 Jul 2020 12:23)    SUBJECTIVE:     -no new complaints or issues at current time.    ROS:  all other review of systems are negative unless indicated above.    acetaminophen   Tablet .. 650 milliGRAM(s) Oral every 4 hours PRN  ascorbic acid 500 milliGRAM(s) Oral daily  chlorhexidine 0.12% Liquid 15 milliLiter(s) Oral Mucosa every 12 hours  clonazePAM  Tablet 1 milliGRAM(s) Oral two times a day  cloNIDine 0.1 milliGRAM(s) Oral every 8 hours  collagenase Ointment 1 Application(s) Topical daily  Dakins Solution - 1/4 Strength 1 Application(s) Topical daily  doxycycline hyclate Capsule 100 milliGRAM(s) Oral every 12 hours  enoxaparin Injectable 40 milliGRAM(s) SubCutaneous daily  famotidine    Tablet 20 milliGRAM(s) Oral two times a day  folic acid 1 milliGRAM(s) Oral daily  glucagon  Injectable 1 milliGRAM(s) IntraMuscular once PRN  hydrOXYzine hydrochloride 50 milliGRAM(s) Oral every 4 hours PRN  loperamide 2 milliGRAM(s) Oral every 4 hours PRN  melatonin 5 milliGRAM(s) Oral at bedtime  multivitamin 1 Tablet(s) Oral daily  oxyCODONE    IR 5 milliGRAM(s) Oral every 6 hours PRN  silver sulfADIAZINE 1% Cream 1 Application(s) Topical daily  thiamine 100 milliGRAM(s) Oral daily    T(C): 36.7 (07-22-20 @ 16:23), Max: 37.4 (07-21-20 @ 19:26)  HR: 118 (07-22-20 @ 16:23) (111 - 139)  BP: 146/98 (07-22-20 @ 16:23) (121/85 - 146/98)  RR: 17 (07-22-20 @ 16:23) (17 - 41)  SpO2: 92% (07-22-20 @ 16:23) (92% - 100%)    Constitutional: NAD.   HEENT: PERRL, EOMI, MMM.  Neck: Soft and supple, No carotid bruit, No JVD  Respiratory: Breath sounds are clear bilaterally, No wheezing, rales or rhonchi  Cardiovascular: S1 and S2, regular rate and rhythm, no murmur, rub or gallop.  Gastrointestinal: Bowel Sounds present, soft, nontender, nondistended, no guarding, no rebound, no mass.  Extremities: No peripheral edema  Vascular: 2+ peripheral pulses  Neurological: A/O x , no focal deficits  Musculoskeletal: 5/5 strength b/l upper and lower extremities  Skin:  no visible rashes.

## 2020-07-22 NOTE — PROGRESS NOTE ADULT - ASSESSMENT
Assessment: 38 y/o female seen by podiatry at bedside in the CCU for the followin.) Full Thickness Fibronecrotic Ulcer of Right fifth digit  2.) Full Thickness Fibronecrotic Ulcer with exposed peroneal tendon, lateral aspect RLE  3.) Full thickness ulcer with exposed Achilles tendon LT  4.) Full thickness ulcer to lateral malleolus, LLE  5) Full thickness ulceration, dorsum of left 1st MTPJ  6) Partial Thickness ulcer 4th interspace, Lt foot  7) Full thickness ulceration, upper 1/3 aspect of left leg at level of fibular notch  8) Non gradable Eschar of 3rd digit, Lt foot  9) Multiple hyperpigmented scar-like lesions scattered all over LE, bl  10) Serous blister, medial malleolus L side  11) Full thickness ulceration, left foot plantar sub metatarsal 5 region  12) Pain in B/L Lower Extremities     Plan:  - Chart reviewed and patient evaluated by Podiatry team.   - X-rays of the b/l LEs reviewed at this time. Case discussed with radiologist in depth. Please note oval radiolucencies visualized in the b/l LEs X-rays are due to air in exposed open wounds.   - Final wound Cx. left 1st MTPJ full-thickness ulceration, reviewed  - All wounds to b/l LEs irrigated with copious amounts of sterile saline.  - SSD and adaptic applied over the right dorsolateral forefoot ulceration.   - Saline-moistened Adaptic applied over exposed peroneal tendons to RLE.   - Adaptic applied over exposed Achilles tendon of LLE.   - Silvadene applied to wounds of L 3rd digit and L lateral malleolus ulceration.  - Silvadene applied to full-thickness ulceration left foot submet 5 region.   - The b/l feet and ankles were wrapped with 4x4 gauze, ABD pad and kerlix, then secured with tape.   - Patient's right and left heels to be offloaded in b/l Z-flex boots to be worn at all times when patient bedbound.  - Vascular Consult, appreciated.   - Discussed case with ID department given purulence exuded from full thickness ulceration to left 1st MPJ and that deep wound cx was taken, Abx per ID appreciated.  - Discussed with CCU doctor the case, and recommendations, appreciated. Discussed case with Medicine department as well now that patient on Med/Surg inpatient unit.  - Discussed findings of full-thickness ulceration at level of left fibular notch with Vascular surgery and Wound care nursing departments. Local wound care of full-thickness ulceration at level of left fibular notch per Wound care nursing department in addition to care of patient's sacral ulceration, appreciated.  - No podiatric surgical intervention necessary at this time as no abscess collections found to b/l lower extremities   - At this time, podiatry will provide supportive, and noninvasive wound care to the wounds, b/l lower extremities, given the fact that the patient is deemed in non stable medically and at risk for acute decompensation.  - Nutrition supplementation per Dietician given albumin level decreased, appreciated.   - Podiatry will follow the case closely while in house. Assessment: 40 y/o female seen by podiatry at bedside in the CCU for the followin.) Full Thickness Fibronecrotic Ulcer of Right fifth digit  2.) Full Thickness Fibronecrotic Ulcer with exposed peroneal tendon, lateral aspect RLE  3.) Full thickness ulcer with exposed Achilles tendon LT  4.) Full thickness ulcer to lateral malleolus, LLE  5) Full thickness ulceration, dorsum of left 1st MTPJ  6) Partial Thickness ulcer 4th interspace, Lt foot  7) Full thickness ulceration, upper 1/3 aspect of left leg at level of fibular notch  8) Non gradable Eschar of 3rd digit, Lt foot  9) Multiple hyperpigmented scar-like lesions scattered all over LE, bl  10) Serous blister, medial malleolus L side  11) Full thickness ulceration, left foot plantar sub metatarsal 5 region  12) Pain in B/L Lower Extremities     Plan:  - Chart reviewed and patient evaluated by Podiatry team.   - X-rays of the b/l LEs reviewed at this time. Case discussed with radiologist in depth. Please note oval radiolucencies visualized in the b/l LEs X-rays are due to air in exposed open wounds.   - Final wound Cx. left 1st MTPJ full-thickness ulceration, reviewed  - All wounds to b/l LEs irrigated with copious amounts of sterile saline.  - SSD and adaptic applied over the right dorsolateral forefoot ulceration.   - Saline-moistened Adaptic applied over exposed peroneal tendons to RLE.   - Adaptic applied over exposed Achilles tendon of LLE.   - Silvadene applied to wounds of L 3rd digit and L lateral malleolus ulceration.  - Silvadene applied to full-thickness ulceration left foot submet 5 region.   - The b/l feet and ankles were wrapped with 4x4 gauze, ABD pad and kerlix, then secured with tape.   - Patient's right and left heels to be offloaded in b/l Z-flex boots to be worn at all times when patient bedbound.  - Vascular Consult, appreciated.   - Discussed case with ID department given purulence exuded from full thickness ulceration to left 1st MPJ and that deep wound cx was taken, Abx per ID appreciated.  - Discussed with CCU doctor the case, and recommendations, appreciated. Discussed case with Medicine department as well now that patient on Med/Surg inpatient unit.  - Discussed findings of full-thickness ulceration at level of left fibular notch with Vascular surgery and Wound care nursing departments. Local wound care of full-thickness ulceration at level of left fibular notch per Wound care nursing department in addition to care of patient's sacral ulceration, appreciated.  - No podiatric surgical intervention necessary at this time as no abscess collections found to b/l lower extremities   - At this time, podiatry will provide supportive, and noninvasive wound care to the wounds, b/l lower extremities, given the fact that the patient is deemed in non stable medically and at risk for acute decompensation.  - Per  patient planned for D/C to rehab facility tomorrow 2020. When patient is deemed stable for discharge by all other medical specialties, patient instructed to f/u at the [facility] for continued podiatric care. Daily local wound care instructions outlined below.   - Nutrition supplementation per Dietician given albumin level decreased, appreciated.   - Podiatry will follow the case closely while in house.     DAILY LOCAL WOUND CARE INSTRUCTIONS  Please irrigate all wounds to b/l LEs copious amounts of sterile saline.  - Apply Silvadene and adaptic over the right dorsolateral forefoot ulceration.   - Apply Saline-moistened Adaptic over exposed peroneal tendons to right leg.   - Apply Adaptic over exposed Achilles tendon of Left lower extremity.   - Apply Silvadene to wounds of Left 3rd digit and Left lateral malleolus ulceration.  - Apply Silvadene to the full-thickness ulceration to the bottom of the left foot underneath the left fifth toe.  - Lastly, wrap the bilateral lower extremities with 4x4 gauze, ABD pads and kerlix and then secure with tape.

## 2020-07-22 NOTE — PROGRESS NOTE ADULT - SUBJECTIVE AND OBJECTIVE BOX
Subjective:  No acute events overnight.    Vital Signs:  Vital Signs Last 24 Hrs  T(C): 36.9 (07-22-20 @ 09:59), Max: 37.7 (07-21-20 @ 16:42)  T(F): 98.4 (07-22-20 @ 09:59), Max: 99.8 (07-21-20 @ 16:42)  HR: 111 (07-22-20 @ 09:59) (111 - 139)  BP: 123/85 (07-22-20 @ 09:59) (121/85 - 143/99)  RR: 23 (07-21-20 @ 22:15) (23 - 42)  SpO2: 98% (07-22-20 @ 09:59) (98% - 100%) on TC    Relevant labs, radiology and Medications reviewed    MEDICATIONS  (STANDING):  ascorbic acid 500 milliGRAM(s) Oral daily  chlorhexidine 0.12% Liquid 15 milliLiter(s) Oral Mucosa every 12 hours  clonazePAM  Tablet 1 milliGRAM(s) Oral two times a day  cloNIDine 0.1 milliGRAM(s) Oral every 8 hours  collagenase Ointment 1 Application(s) Topical daily  Dakins Solution - 1/4 Strength 1 Application(s) Topical daily  doxycycline hyclate Capsule 100 milliGRAM(s) Oral every 12 hours  enoxaparin Injectable 40 milliGRAM(s) SubCutaneous daily  famotidine    Tablet 20 milliGRAM(s) Oral two times a day  folic acid 1 milliGRAM(s) Oral daily  melatonin 5 milliGRAM(s) Oral at bedtime  multivitamin 1 Tablet(s) Oral daily  silver sulfADIAZINE 1% Cream 1 Application(s) Topical daily  thiamine 100 milliGRAM(s) Oral daily    MEDICATIONS  (PRN):  acetaminophen   Tablet .. 650 milliGRAM(s) Oral every 4 hours PRN Temp greater or equal to 38C (100.4F), Mild Pain (1 - 3)  glucagon  Injectable 1 milliGRAM(s) IntraMuscular once PRN Glucose <70 milliGRAM(s)/deciLiter  hydrOXYzine hydrochloride 50 milliGRAM(s) Oral every 4 hours PRN Anxiety  loperamide 2 milliGRAM(s) Oral every 4 hours PRN Loose stool  oxyCODONE    IR 5 milliGRAM(s) Oral every 6 hours PRN Moderate Pain (4 - 6)      Physical exam  Gen: NAD   Neuro: AO x 3  Card: S1 S2  Pulm: equal bilaterally  Abd: Soft NT ND - PEG in place  Ext: moderate edema    I&O's Summary    21 Jul 2020 07:01  -  22 Jul 2020 07:00  --------------------------------------------------------  IN: 200 mL / OUT: 0 mL / NET: 200 mL        Assessment  39y Female  w/ PAST MEDICAL & SURGICAL HISTORY:  Smoker  Heroin abuse  H/O Raynaud's syndrome  Rheumatoid arthritis  Lupus  Gall bladder stones  H/O appendicitis  H/O tubal ligation    Patient is a 39y old  Female who presents with a chief complaint of Cellulitis with sepsis, symptomatic anemia. (22 Jul 2020 08:24)    39y Female h/o lupus, RA, epilepsy (last seizure 8 yrs ago), chronic pain, IVDA admitted w weakness, failure to thrive. Found to have acute anemia, NATALIA, transaminitis, metabolic lactic acidosis, severe dehydration, severe protein calorie malnutrition. And severe sepsis with septic shock secondary to MRSA bacteremia and UTI treated with a two-week course of Vancomycin, developed worsening respiratory failure and was ultimately intubated on 6/27/20.  s/p tracheostomy 7/13 currently being treated for fungemia.  S/P PEG placement on 7/16/20.    Plan:    Trach collar as tolerated.  TF to goal.  Antibiotics as per ID.  Other care as per CCU.  	    Discussed with Cardiothoracic Team at AM rounds.

## 2020-07-22 NOTE — CONSULT NOTE ADULT - ASSESSMENT
38 yo F admitted 6/24 presented with MRSA bacteremia, NATALIA, transaminitis, metabolic lactic acidosis,   respiratory failure requiring intubation now s/p trach (7/13), septic shock 2/2 MRSA bacteremia and fungemia, treated. The patient was on trach collar and was transferred to floor awaiting placement, RRT on 7/22 for hypoxia and resp distress. Transferred to the ICU for tachypnea tachycardia and hypoxia requiring trach to vent from TC     -Febrile to 102 Tylenol for fever   -pt re cultured on ABX - doxy   -pt now trach to vent with improved distress and tachypnea   -oxycodone and hydroxyzine for restlessness   -Tachycardic even after core body temp improved to 98 and no longer in distress, was given 2.5 of lopressor for sinus tachycardia with HR in the 150's with improvement  -frequent suctioning     Critical Care time: 40 mins assessing presenting problems of acute illness that poses high probability of life threatening deterioration or end organ damage/dysfunction.  Medical decision making including Initiating plan of care, reviewing data, reviewing radiology, direct patient bedside evaluation and interpretation of vital signs, any necessary ventilator management , discussion with multidisciplinary team, discussing goals of care with patient/family, all non inclusive of procedures

## 2020-07-22 NOTE — PROGRESS NOTE ADULT - ASSESSMENT
40 yo F admitted 6/24 with MRSA bacteremia, anemia, NATALIA, transaminitis, metabolic lactic acidosis, severe dehydration/malnutrition, respiratory failure requiring intubation   s/p trach & PEG (7/13), septic shock 2/2 MRSA bacteremia and UTI now fungemic.   cachexia and severe protein calorie malnutrition  functional quadriplegia   hemodynamics reasonable  tolerating TC >48*  tolerating TF's    PMHX IVDA, lupus, RA, epilepsy (last seizure 8 years ago), recent use of snorting heroin (2 bags daily)       PLAN at this time is for:  - T collar as tolerated, chest PT  - physical therapy, OOB to chair  - Doxy, Fluconazole, ID input appreciated  - BP control  - monitor electrolytes  - feeds via PEG  - Oxycodone IR; xanax, Klonopin 1mg PO BID  - DVT ppx  - Consider swallow eval in 24* if remains on TC without issue  - Placement planning - Social work involved in case.

## 2020-07-22 NOTE — PROGRESS NOTE ADULT - SUBJECTIVE AND OBJECTIVE BOX
Date of service: 7/22/2020  Chief Complaint: B/L lower extremity wounds     HPI: Pt was examined at bedside by podiatry with attending present, for B/L lower extremity ulcerative lesions. Pt is a 40 yo female who has been admitted to the CCU for septic shock, UTI with MRSA and bacteremia. Noted Pt is currently intubated and non verbal, she is alert and oriented. The patient is also being treated for anemia, acute respiratory failure and toxic metabolic encephalopathy. Of Note, Pt has a history of IVDA drug use and is critically ill and at risk for acute decompensation possible death per the intensivist. Due to the patient being intubated all additional medical history and information was gathered from the patients chart.    7/22: Patient seen and examined by podiatry for follow-up evaluation of right and left lower extremity wounds. Patient noted to be moved to MED/SURG inpatient floor from the CCU. Patient noted be arousable at bedside and patient s/p tracheostomy procedure performed on 7/13/2020 and PEG placed on 7/16/2020. Patient noted to be responsive to external stimuli when her lower extremities are evaluated by Podiatry. All further HPI obtained via patient's chart.    REVIEW OF SYSTEMS:  Unable to assess due to pt's inability to communicate per intubation    Allergies:  morphine (Unknown)    Past Medical History:  Lupus, RA, Reynard's Epilepsy, IVDA     Family History:  No pertinent family history in first degree relatives: cannot recall family history at this time    Social History:  lives alone  single  smokes 4 cig/day  uses two bags of heroin a day (24 Jun 2020 01:21)    Vitals  Vital Signs Last 24 Hrs  T(C): 37 (22 Jul 2020 05:00), Max: 37.7 (21 Jul 2020 16:42)  T(F): 98.6 (22 Jul 2020 05:00), Max: 99.8 (21 Jul 2020 16:42)  HR: 113 (22 Jul 2020 05:00) (113 - 139)  BP: 121/85 (22 Jul 2020 05:00) (121/85 - 175/92)  BP(mean): 101 (21 Jul 2020 21:00) (97 - 110)  RR: 23 (21 Jul 2020 22:15) (23 - 42)  SpO2: 100% (22 Jul 2020 05:00) (100% - 100%)      Physical examination:  Constitutional: Pt intubated, lying in bed    Focused LE examination:   Vasc: DP and PT pulses non palpable b/l, CFT < 5 x 10, TG: warm to warm  Neuro and MSK: Unable to assess due to patient's condition    Derm:  Noted Multiple hyperpigmented scar-like lesions scattered all over LE, bl    Rt LE  - Full thickness ulcer with exposed peroneal tendons on lateral aspect of distal RLE that + probes to Fibula, no drainage, + undermining all around the clock, + tenderness. Negative crepitus or fluctuance to area of lateral aspect of the distal RLE.  - Rt 5th toe demonstrates a full thickness with less fibronecotic wound bed and evidence of increasing granulation tissue compared to the prior examination, measuring approximately 7.2 x 4.6 x 0.8 cm, - purulence, - malodor, - fluctuance +Probe to 5th metatarsal head, EDL tendon exposed and + undermining from the medial aspect, + tunneling to the 9 hour.     Lt LE:  -Full thickness ulcer round in shape and approx  0.5 cm distal to lateral malleolus, tunneling to 3 and 6 o'clock positions, + undermining all around, - drainage, - fluctuance - malodor, + probe to bone to the lateral malleolus, - purulence; negative crepitus and - fluctuance  - Multiple Full thickness ulcerations with exposed Achilles tendon noted to the posterior surface of the left distal 1/3 aspect of the left leg.  -Swelling and mild edema noted over the medial malleolus of LLE. Intact serous blister noted to the medial malleolar region. Edema noted to the dorsal-lateral aspect of the left fifth MPJ, noted to be mostly likely pressure induced; area of black-blue discoloration noted to the the left fifth sub met 5 region.   -Partial thickness ulcer, 4th interdigital space with presence of dry blood, - probe to bone, - undermining - tunneling  - Non gradable eschar on 3rd digit on Lt foot  - Full thickness ulceration noted to the dorsum of the left 1st MPJ. Positive probing to the left 1st MPJ. Positive purulence exuded from the wound with milking of the left foot.  - Full thickness ulceration noted to the lateral aspect of the upper 1/3 aspect of the leg at the level of the fibular notch. + tendon exposed; no drainage with palpation of the periwound area; no malodor exuded from the wound, negative fluctuance and negative crepitus to the area. Full thickness ulceration L fibular notch area noted to be covered with Alleyvn pad.   - Full thickness ulceration noted to plantar aspect of left foot sub metatarsal 5 region. + Serous drainage noted with milking of the left foot. - fluctuance or crepitus felt to the sub met 5 left foot. No malodor exuded from drainage to the left foot. - probe to bone, - undermining and - tunneling.       Labs:  Complete Blood Count in AM (07.20.20 @ 07:59)    WBC Count: 10.60 K/uL    RBC Count: 2.86 M/uL    Hemoglobin: 8.1 g/dL    Hematocrit: 26.9 %    Mean Cell Volume: 94.1 fl    Mean Cell Hemoglobin: 28.3 pg    Mean Cell Hemoglobin Conc: 30.1 gm/dL    Red Cell Distrib Width: 15.1 %    Platelet Count - Automated: 553 K/uL    Basic Metabolic Panel in AM (07.18.20 @ 11:52)    Sodium, Serum: 136 mmol/L    Potassium, Serum: 4.9 mmol/L    Chloride, Serum: 109 mmol/L    Carbon Dioxide, Serum: 19 mmol/L    Anion Gap, Serum: 8 mmol/L    Blood Urea Nitrogen, Serum: 30 mg/dL    Creatinine, Serum: 0.91 mg/dL    Glucose, Serum: 90 mg/dL    Calcium, Total Serum: 7.8 mg/dL    eGFR if Non : 80: Interpretative comment  The units for eGFR are mL/min/1.73M2 (normalized body surface area). The  eGFR is calculated from a serum creatinine using the CKD-EPI equation.  Other variables required for calculation are race, age and sex. Among  patients with chronic kidney disease (CKD), the eGFR is useful in  determining the stage of disease according to KDOQI CKD classification.  All eGFR results are reported numerically with the following  interpretation.          GFR                    With                 Without     (ml/min/1.73 m2)    Kidney Damage       Kidney Damage        >= 90                    Stage 1                     Normal        60-89                    Stage 2                     Decreased GFR        30-59     Stage 3                     Stage 3        15-29                    Stage 4                     Stage 4        < 15                      Stage 5                     Stage 5  Each stage of CKD assumes that the associated GFR level has been in  effect for at least 3 months. Determination of stages one and two (with  eGFR > 59 ml/min/m2) requires estimation of kidney damage for at least 3  months as defined by structural or functional abnormalities.  Limitations: All estimates of GFR will be less accurate for patients at  extremes of muscle mass (including but not limited to frail elderly,  critically ill, or cancer patients), those with unusual diets, and those  with conditions associated with reduced secretion or extrarenal  elimination of creatinine. The eGFR equation is not recommended for use  in patients with unstable creatinine levels. mL/min/1.73M2    eGFR if African American: 93 mL/min/1.73M2    Culture - Blood in AM (07.06.20 @ 14:30)    Gram Stain:   Growth in aerobic bottle: Yeast like cells    -  5-Flucytosine: N/I For Candida species, no interpretation available for any invitro susceptibility testing.    -  Amphotericin B: N/I 0.25    -  Andulafungin: N/I 0.12    -  Caspofungin: N/I 0.06 CASPOFUNGIN: is indicated in the treatment of candidemia, intra-abdominal abscess, peritonitis, pleural space infections and esophageal candidiasis.    -  Fluconazole: N/I <=0.12 Fluconazole = Data established for mucosal disease, and is generally applicable for invasive disease.  Susceptibility is dependent on achieving the maximal possible blood level.  Doses of 400 MG/day or more may be required in adults with normal renalfunction and body habitus.    -  Itraconazole: N/I For Candida species, no interpretation available for any invitro susceptibility testing.    -  Micafungin: N/I 0.015    -  Posaconazole: N/I 0.015    -  Voriconazole: N/I <=0.008 VORICONAZOLE: The KRYSTEN has been determined by the colormetric microdilution method.    -  Multidrug (KPC pos) resistant organism: Nondet    -  Staphylococcus aureus: Nondet    -  Methicillin resistant Staphylococcus aureus (MRSA): Nondet    -  Coagulase negative Staphylococcus: Nondet    -  Enterococcus species: Nondet    -  Vancomycin resistant Enterococcus sp.: Nondet    -  Escherichia coli: Nondet    -  Klebsiella oxytoca: Nondet    -  Klebsiella pneumoniae: Nondet    -  Serratia marcescens: Nondet    -  Haemophilus influenzae: Nondet    -  Listeria monocytogenes: Nondet    -  Neisseria meningitidis: Nondet    -  Pseudomonas aeruginosa: Nondet    -  Acinetobacter baumanii: Nondet    -  Enterobacter cloacae complex: Nondet    -  Streptococcus sp. (Not Grp A, B or S pneumoniae): Nondet    -  Streptococcus agalactiae (Group B): Nondet    -  Streptococcus pyogenes (Group A): Nondet    -  Streptococcus pneumoniae: Nondet    -  Candida albicans: Nondet    -  Candida glabrata: Nondet    -  Candida krusei: Nondet    -  Candida parapsilosis: Nondet    -  Candida tropicalis: Nondet    -  Interpretation: See note This test method is not approved by the FDA and is for research use only.  The performance characteristics of this assay may have been determined by [facility] and [facility], [place], [place]                            N/I - No interpretation available                                                         SDD – Sensitive Dose Dependent    Specimen Source: .Blood Blood-Peripheral    Organism: Blood Culture PCR    Organism: Brittny dubliniensis    Culture Results:   Growth in aerobic bottle: Brittny dubliniensis  "Due to technical problems, Proteus sp. will Not be reported as part of  the BCID panel until further notice"  ***Blood Panel PCR results on this specimen are available  approximately 3 hours after the Gram stain result.***  Gram stain, PCR, and/or culture results may not always  correspond due to difference in methodologies.  ************************************************************  This PCR assay was performed using [employer].  The following targets are tested for: Enterococcus,  vancomycin resistant enterococci, Listeria monocytogenes,  coagulase negative staphylococci, S. aureus,  methicillin resistant S. aureus, Streptococcus agalactiae  (Group B), S. pneumoniae, S. pyogenes (Group A),  Acinetobacter baumannii, Enterobacter cloacae, E. coli,  Klebsiella oxytoca, K. pneumoniae, Proteus sp.,  Serratia marcescens, Haemophilus influenzae,  Neisseria meningitidis, Pseudomonas aeruginosa, Candida  albicans, C. glabrata, C krusei, C parapsilosis,  C. tropicalis and the KPC resistance gene.    Organism Identification: Blood Culture PCR  Candida dubliniensis    Method Type: PCR    Method Type: YSTMIC      Imaging:    < from: US Duplex Arterial Lower Ext Compl, Bilateral (07.14.20 @ 14:49) >  IMPRESSION:  No evidence of a hemodynamically significant stenosis or occlusion in the visualized lower extremity arteries.  Subcutaneous edema throughout both lower extremities with fluid collections in both groins.  Monophasic waveforms throughout both lower extremities.  < end of copied text >      < from: TTE Echo Complete w/o Contrast w/ Doppler (06.27.20 @ 08:58) >   Impression   Summary   The mid to apical anterolateral to anterior segments are severely   hypokinetic. The apical inferoseptal wall appears hypokinetic.   Estimated left ventricular ejection fraction is 45-50 %.   A catheter wire is seen in the right atrium.   Mild (1+) tricuspid valve regurgitation is present.   Mild pulmonary hypertension.  < end of copied text >      < from: Xray Ankle Complete 3 Views, Bilateral (07.09.20 @ 16:42) >  EXAM:  XR ANKLE COMP MIN 3 VIEWS BI                        *** ADDENDUM 07/10/2020  ***                                                           Addendum:    The posterior soft tissue gas of the left ankle may be related to skin ulcerationor early fistulous tract formation. Clinical correlation is suggested.  *** END OF ADDENDUM 07/10/2020  ***  PROCEDURE DATE:  07/09/2020    INTERPRETATION:  Bilateral ankles  HISTORY: Bilateral ulcers    Three views of the right ankle and three views of the left ankle show no evidence of fracture nor destructive change. The joint spaces are maintained.  Subcutaneous soft tissue gas is present behind the left ankle.  IMPRESSION: Soft tissue gas as noted.  Thank you for this referral  ***Please see the addendum at the top of this report. It may contain additional important information or changes.****  < end of copied text >

## 2020-07-22 NOTE — PROVIDER CONTACT NOTE (CHANGE IN STATUS NOTIFICATION) - ACTION/TREATMENT ORDERED:
Around 0400, pt noted to have change in mental status with agonal breathing. Intensivist PA called. Emergent intubation. Additional lasix given. BS 59-dextrose given. ABG performed. Propofol, bicarb gtt, and zosyn initiated. Salgado in place. COVID sample taken. Awaiting to have CT of abdomen, chest, and head. Report given to day nurse.
Dilaudud 1 mg iv stat
Pt suctioned by rapid response team, oxygenated via ambu bag, chest xray done at bedside and patient transferred to ICU.

## 2020-07-22 NOTE — PROVIDER CONTACT NOTE (CHANGE IN STATUS NOTIFICATION) - SITUATION
Pt agitated, restless, and verbalizing pain. CODY Shin called. Oxycodone 5mg ordered as well as Ativan 2 mg IM. Pt did not respond to treatment and still restless, agitated, and in pain. CODY kimbrough.
Pt tachypneic upon assessment, stating to primary RN "I cant breathe please help." RN suctioned via trach x 2.  VS: RR 32,  bp 142/101, 02 sat 94%, Axillary temp 99.5.  Pt showed no relief in respiratory and heart rate from suctioning and still stating distress.
Pt with hypertension -150 pt grimacing anxious

## 2020-07-22 NOTE — CONSULT NOTE ADULT - SUBJECTIVE AND OBJECTIVE BOX
CC:  Patient is a 39y old  Female who presents with a chief complaint of Cellulitis with sepsis, symptomatic anemia. (22 Jul 2020 16:44)      HPI/BRIEF HOSPITAL COURSE: ***    Events last 24 hours:   RRT called for hypoxia, tachycardia and HTN, with respiratory distress, "reporting to nurse she cannot breathe," SpO2 88-90% while being bagged on 100% BVM. Pt tachypneic to RR 40 with rapid shallow breathing, tachycardic to 140-150. Pt ambu lavaged numerous time during the rapid and remained SpO2 888-90% while being ambu'd, CXR with no obvious PTX or white out/ mucous plug. Pt transferred back to the ICU to be placed back on MV. Pt with rectal temp of 101 F, currently on doxy will plan for reculture. will plan to ben off vent in AM     PAST MEDICAL & SURGICAL HISTORY:  Smoker  Heroin abuse  H/O Raynaud's syndrome  Rheumatoid arthritis  Lupus  Gall bladder stones  H/O appendicitis  H/O tubal ligation    Allergies    morphine (Unknown)    Intolerances      FAMILY HISTORY:  No pertinent family history in first degree relatives: cannot recall family history at this time      Review of Systems:  ROS unable to be obtained pt in distress bagged on TC     Medications:  doxycycline hyclate Capsule 100 milliGRAM(s) Oral every 12 hours    cloNIDine 0.1 milliGRAM(s) Oral every 8 hours      acetaminophen   Tablet .. 650 milliGRAM(s) Oral every 4 hours PRN  clonazePAM  Tablet 1 milliGRAM(s) Oral two times a day  hydrOXYzine hydrochloride 50 milliGRAM(s) Oral every 4 hours PRN  melatonin 5 milliGRAM(s) Oral at bedtime  oxyCODONE    IR 5 milliGRAM(s) Oral every 6 hours PRN      enoxaparin Injectable 40 milliGRAM(s) SubCutaneous daily    famotidine    Tablet 20 milliGRAM(s) Oral two times a day  loperamide 2 milliGRAM(s) Oral every 4 hours PRN      glucagon  Injectable 1 milliGRAM(s) IntraMuscular once PRN    ascorbic acid 500 milliGRAM(s) Oral daily  folic acid 1 milliGRAM(s) Oral daily  multivitamin 1 Tablet(s) Oral daily  thiamine 100 milliGRAM(s) Oral daily      chlorhexidine 0.12% Liquid 15 milliLiter(s) Oral Mucosa every 12 hours  collagenase Ointment 1 Application(s) Topical daily  Dakins Solution - 1/4 Strength 1 Application(s) Topical daily  silver sulfADIAZINE 1% Cream 1 Application(s) Topical daily            ICU Vital Signs Last 24 Hrs  T(C): 36.7 (22 Jul 2020 16:23), Max: 37 (22 Jul 2020 05:00)  T(F): 98.1 (22 Jul 2020 16:23), Max: 98.6 (22 Jul 2020 05:00)  HR: 118 (22 Jul 2020 16:23) (111 - 118)  BP: 146/98 (22 Jul 2020 16:23) (121/85 - 146/98)  BP(mean): --  ABP: --  ABP(mean): --  RR: 17 (22 Jul 2020 16:23) (17 - 17)  SpO2: 92% (22 Jul 2020 16:23) (92% - 100%)    Vital Signs Last 24 Hrs  T(C): 36.7 (22 Jul 2020 16:23), Max: 37 (22 Jul 2020 05:00)  T(F): 98.1 (22 Jul 2020 16:23), Max: 98.6 (22 Jul 2020 05:00)  HR: 118 (22 Jul 2020 16:23) (111 - 118)  BP: 146/98 (22 Jul 2020 16:23) (121/85 - 146/98)  BP(mean): --  RR: 17 (22 Jul 2020 16:23) (17 - 17)  SpO2: 92% (22 Jul 2020 16:23) (92% - 100%)        I&O's Detail    21 Jul 2020 07:01  -  22 Jul 2020 07:00  --------------------------------------------------------  IN:    Solution: 200 mL  Total IN: 200 mL    OUT:  Total OUT: 0 mL    Total NET: 200 mL      22 Jul 2020 07:01  -  22 Jul 2020 22:40  --------------------------------------------------------  IN:    Oral Fluid: 360 mL  Total IN: 360 mL    OUT:  Total OUT: 0 mL    Total NET: 360 mL            LABS:                CAPILLARY BLOOD GLUCOSE            CULTURES:    doxycycline hyclate Capsule 100 milliGRAM(s) Oral every 12 hours      Physical Examination:  General: cachetic tachypnea acute distress.  Alert, interactive, nonfocal  NEURO: A&O X3, motor function 5/5 BL UE/LE  HEENT: Pupils equal, reactive to light.  Symmetric.  PULM: coarse BS on right, distant on left, TC to aerosol cuff inflated bagged with 100% BVM  CVS: Regular rate and rhythm, no murmurs, rubs, or gallops  ABD: Soft, nondistended, nontender, normoactive bowel sounds, no masses  EXT: contracted multiple DT wounds   SKIN: Warm and well perfused, no rashes noted      EKG: Sinus tachycardia     RADIOLOGY:   Pending read CXR       CENTRAL LINE: N          DATE INSERTED:                  MARI: N                        DATE INSERTED:                  A-LINE: N                       DATE INSERTED:                  GLOBAL ISSUE/BEST PRACTICE:  Analgesia: oxycodone   Sedation: N/A  HOB elevation: yes  Stress ulcer prophylaxis: protonix   VTE prophylaxis: Lovenox SQ  Glycemic control: N/A  Nutrition: TF CC:  Patient is a 39y old  Female who presents with a chief complaint of Cellulitis with sepsis, symptomatic anemia. (22 Jul 2020 16:44)      HPI/BRIEF HOSPITAL COURSE:  38 yo F admitted 6/24 presented with MRSA bacteremia, NATALIA, transaminitis, metabolic lactic acidosis,   respiratory failure requiring intubation now s/p trach (7/13), septic shock 2/2 MRSA bacteremia and fungemia, treated. The patient was on trach collar and was transferred to floor awaiting placement, RRT on 7/22 for hypoxia and resp distress.    Events last 24 hours:   RRT called for hypoxia, tachycardia and HTN, with respiratory distress, "reporting to nurse she cannot breathe," SpO2 88-90% while being bagged on 100% BVM. Pt tachypneic to RR 40 with rapid shallow breathing, tachycardic to 140-150. Pt ambu lavaged numerous time during the rapid and remained SpO2 888-90% while being ambu'd, CXR with no obvious PTX or white out/ mucous plug. Pt transferred back to the ICU to be placed back on MV. Pt with rectal temp of 101 F, currently on doxy will plan for reculture. will plan to ben off vent in AM     PAST MEDICAL & SURGICAL HISTORY:  Smoker  Heroin abuse  H/O Raynaud's syndrome  Rheumatoid arthritis  Lupus  Gall bladder stones  H/O appendicitis  H/O tubal ligation    Allergies    morphine (Unknown)    Intolerances      FAMILY HISTORY:  No pertinent family history in first degree relatives: cannot recall family history at this time      Review of Systems:  ROS unable to be obtained pt in distress bagged on TC     Medications:  doxycycline hyclate Capsule 100 milliGRAM(s) Oral every 12 hours    cloNIDine 0.1 milliGRAM(s) Oral every 8 hours      acetaminophen   Tablet .. 650 milliGRAM(s) Oral every 4 hours PRN  clonazePAM  Tablet 1 milliGRAM(s) Oral two times a day  hydrOXYzine hydrochloride 50 milliGRAM(s) Oral every 4 hours PRN  melatonin 5 milliGRAM(s) Oral at bedtime  oxyCODONE    IR 5 milliGRAM(s) Oral every 6 hours PRN      enoxaparin Injectable 40 milliGRAM(s) SubCutaneous daily    famotidine    Tablet 20 milliGRAM(s) Oral two times a day  loperamide 2 milliGRAM(s) Oral every 4 hours PRN      glucagon  Injectable 1 milliGRAM(s) IntraMuscular once PRN    ascorbic acid 500 milliGRAM(s) Oral daily  folic acid 1 milliGRAM(s) Oral daily  multivitamin 1 Tablet(s) Oral daily  thiamine 100 milliGRAM(s) Oral daily      chlorhexidine 0.12% Liquid 15 milliLiter(s) Oral Mucosa every 12 hours  collagenase Ointment 1 Application(s) Topical daily  Dakins Solution - 1/4 Strength 1 Application(s) Topical daily  silver sulfADIAZINE 1% Cream 1 Application(s) Topical daily            ICU Vital Signs Last 24 Hrs  T(C): 36.7 (22 Jul 2020 16:23), Max: 37 (22 Jul 2020 05:00)  T(F): 98.1 (22 Jul 2020 16:23), Max: 98.6 (22 Jul 2020 05:00)  HR: 118 (22 Jul 2020 16:23) (111 - 118)  BP: 146/98 (22 Jul 2020 16:23) (121/85 - 146/98)  BP(mean): --  ABP: --  ABP(mean): --  RR: 17 (22 Jul 2020 16:23) (17 - 17)  SpO2: 92% (22 Jul 2020 16:23) (92% - 100%)    Vital Signs Last 24 Hrs  T(C): 36.7 (22 Jul 2020 16:23), Max: 37 (22 Jul 2020 05:00)  T(F): 98.1 (22 Jul 2020 16:23), Max: 98.6 (22 Jul 2020 05:00)  HR: 118 (22 Jul 2020 16:23) (111 - 118)  BP: 146/98 (22 Jul 2020 16:23) (121/85 - 146/98)  BP(mean): --  RR: 17 (22 Jul 2020 16:23) (17 - 17)  SpO2: 92% (22 Jul 2020 16:23) (92% - 100%)        I&O's Detail    21 Jul 2020 07:01  -  22 Jul 2020 07:00  --------------------------------------------------------  IN:    Solution: 200 mL  Total IN: 200 mL    OUT:  Total OUT: 0 mL    Total NET: 200 mL      22 Jul 2020 07:01  -  22 Jul 2020 22:40  --------------------------------------------------------  IN:    Oral Fluid: 360 mL  Total IN: 360 mL    OUT:  Total OUT: 0 mL    Total NET: 360 mL            LABS:                CAPILLARY BLOOD GLUCOSE            CULTURES:    doxycycline hyclate Capsule 100 milliGRAM(s) Oral every 12 hours      Physical Examination:  General: cachetic tachypnea acute distress.  Alert, interactive, nonfocal  NEURO: A&O X3, motor function 5/5 BL UE/LE  HEENT: Pupils equal, reactive to light.  Symmetric.  PULM: coarse BS on right, distant on left, TC to aerosol cuff inflated bagged with 100% BVM  CVS: Regular rate and rhythm, no murmurs, rubs, or gallops  ABD: Soft, nondistended, nontender, normoactive bowel sounds, no masses  EXT: contracted multiple DT wounds   SKIN: Warm and well perfused, no rashes noted      EKG: Sinus tachycardia     RADIOLOGY:   Pending read CXR       CENTRAL LINE: N          DATE INSERTED:                  MARI: N                        DATE INSERTED:                  A-LINE: N                       DATE INSERTED:                  GLOBAL ISSUE/BEST PRACTICE:  Analgesia: oxycodone   Sedation: N/A  HOB elevation: yes  Stress ulcer prophylaxis: protonix   VTE prophylaxis: Lovenox SQ  Glycemic control: N/A  Nutrition: TF

## 2020-07-23 LAB
ALBUMIN SERPL ELPH-MCNC: 1.2 G/DL — LOW (ref 3.3–5)
ALP SERPL-CCNC: 120 U/L — SIGNIFICANT CHANGE UP (ref 40–120)
ALT FLD-CCNC: 21 U/L — SIGNIFICANT CHANGE UP (ref 12–78)
ANION GAP SERPL CALC-SCNC: 8 MMOL/L — SIGNIFICANT CHANGE UP (ref 5–17)
APPEARANCE UR: ABNORMAL
AST SERPL-CCNC: 39 U/L — HIGH (ref 15–37)
BASOPHILS # BLD AUTO: 0.04 K/UL — SIGNIFICANT CHANGE UP (ref 0–0.2)
BASOPHILS NFR BLD AUTO: 0.4 % — SIGNIFICANT CHANGE UP (ref 0–2)
BILIRUB SERPL-MCNC: 0.3 MG/DL — SIGNIFICANT CHANGE UP (ref 0.2–1.2)
BILIRUB UR-MCNC: NEGATIVE — SIGNIFICANT CHANGE UP
BUN SERPL-MCNC: 22 MG/DL — SIGNIFICANT CHANGE UP (ref 7–23)
CALCIUM SERPL-MCNC: 7.8 MG/DL — LOW (ref 8.5–10.1)
CHLORIDE SERPL-SCNC: 107 MMOL/L — SIGNIFICANT CHANGE UP (ref 96–108)
CO2 SERPL-SCNC: 25 MMOL/L — SIGNIFICANT CHANGE UP (ref 22–31)
COLOR SPEC: YELLOW — SIGNIFICANT CHANGE UP
CREAT SERPL-MCNC: 0.78 MG/DL — SIGNIFICANT CHANGE UP (ref 0.5–1.3)
DIFF PNL FLD: ABNORMAL
EOSINOPHIL # BLD AUTO: 0.13 K/UL — SIGNIFICANT CHANGE UP (ref 0–0.5)
EOSINOPHIL NFR BLD AUTO: 1.4 % — SIGNIFICANT CHANGE UP (ref 0–6)
GLUCOSE SERPL-MCNC: 88 MG/DL — SIGNIFICANT CHANGE UP (ref 70–99)
GLUCOSE UR QL: NEGATIVE MG/DL — SIGNIFICANT CHANGE UP
GRAM STN FLD: SIGNIFICANT CHANGE UP
HCT VFR BLD CALC: 24.4 % — LOW (ref 34.5–45)
HGB BLD-MCNC: 7.5 G/DL — LOW (ref 11.5–15.5)
IMM GRANULOCYTES NFR BLD AUTO: 1.1 % — SIGNIFICANT CHANGE UP (ref 0–1.5)
KETONES UR-MCNC: ABNORMAL
LEUKOCYTE ESTERASE UR-ACNC: ABNORMAL
LYMPHOCYTES # BLD AUTO: 0.75 K/UL — LOW (ref 1–3.3)
LYMPHOCYTES # BLD AUTO: 8.3 % — LOW (ref 13–44)
MAGNESIUM SERPL-MCNC: 1.3 MG/DL — LOW (ref 1.6–2.6)
MCHC RBC-ENTMCNC: 29 PG — SIGNIFICANT CHANGE UP (ref 27–34)
MCHC RBC-ENTMCNC: 30.7 GM/DL — LOW (ref 32–36)
MCV RBC AUTO: 94.2 FL — SIGNIFICANT CHANGE UP (ref 80–100)
MONOCYTES # BLD AUTO: 0.46 K/UL — SIGNIFICANT CHANGE UP (ref 0–0.9)
MONOCYTES NFR BLD AUTO: 5.1 % — SIGNIFICANT CHANGE UP (ref 2–14)
NEUTROPHILS # BLD AUTO: 7.58 K/UL — HIGH (ref 1.8–7.4)
NEUTROPHILS NFR BLD AUTO: 83.7 % — HIGH (ref 43–77)
NITRITE UR-MCNC: NEGATIVE — SIGNIFICANT CHANGE UP
PH UR: 5 — SIGNIFICANT CHANGE UP (ref 5–8)
PHOSPHATE SERPL-MCNC: 5.4 MG/DL — HIGH (ref 2.5–4.5)
PLATELET # BLD AUTO: 480 K/UL — HIGH (ref 150–400)
POTASSIUM SERPL-MCNC: 4.1 MMOL/L — SIGNIFICANT CHANGE UP (ref 3.5–5.3)
POTASSIUM SERPL-SCNC: 4.1 MMOL/L — SIGNIFICANT CHANGE UP (ref 3.5–5.3)
PROT SERPL-MCNC: 6.3 GM/DL — SIGNIFICANT CHANGE UP (ref 6–8.3)
PROT UR-MCNC: 30 MG/DL
RBC # BLD: 2.59 M/UL — LOW (ref 3.8–5.2)
RBC # FLD: 15.1 % — HIGH (ref 10.3–14.5)
SARS-COV-2 RNA SPEC QL NAA+PROBE: SIGNIFICANT CHANGE UP
SODIUM SERPL-SCNC: 140 MMOL/L — SIGNIFICANT CHANGE UP (ref 135–145)
SP GR SPEC: 1.01 — SIGNIFICANT CHANGE UP (ref 1.01–1.02)
SPECIMEN SOURCE: SIGNIFICANT CHANGE UP
UROBILINOGEN FLD QL: NEGATIVE MG/DL — SIGNIFICANT CHANGE UP
WBC # BLD: 9.06 K/UL — SIGNIFICANT CHANGE UP (ref 3.8–10.5)
WBC # FLD AUTO: 9.06 K/UL — SIGNIFICANT CHANGE UP (ref 3.8–10.5)

## 2020-07-23 PROCEDURE — 99232 SBSQ HOSP IP/OBS MODERATE 35: CPT

## 2020-07-23 PROCEDURE — 99233 SBSQ HOSP IP/OBS HIGH 50: CPT

## 2020-07-23 PROCEDURE — 74176 CT ABD & PELVIS W/O CONTRAST: CPT | Mod: 26

## 2020-07-23 RX ORDER — METOPROLOL TARTRATE 50 MG
2.5 TABLET ORAL ONCE
Refills: 0 | Status: COMPLETED | OUTPATIENT
Start: 2020-07-23 | End: 2020-07-23

## 2020-07-23 RX ORDER — MAGNESIUM OXIDE 400 MG ORAL TABLET 241.3 MG
400 TABLET ORAL ONCE
Refills: 0 | Status: COMPLETED | OUTPATIENT
Start: 2020-07-23 | End: 2020-07-23

## 2020-07-23 RX ORDER — FAMOTIDINE 10 MG/ML
20 INJECTION INTRAVENOUS
Refills: 0 | Status: DISCONTINUED | OUTPATIENT
Start: 2020-07-23 | End: 2020-07-31

## 2020-07-23 RX ADMIN — Medication 0.1 MILLIGRAM(S): at 22:30

## 2020-07-23 RX ADMIN — MAGNESIUM OXIDE 400 MG ORAL TABLET 400 MILLIGRAM(S): 241.3 TABLET ORAL at 14:44

## 2020-07-23 RX ADMIN — CHLORHEXIDINE GLUCONATE 15 MILLILITER(S): 213 SOLUTION TOPICAL at 00:02

## 2020-07-23 RX ADMIN — Medication 100 MILLIGRAM(S): at 00:01

## 2020-07-23 RX ADMIN — Medication 5 MILLIGRAM(S): at 22:30

## 2020-07-23 RX ADMIN — Medication 5 MILLIGRAM(S): at 00:01

## 2020-07-23 RX ADMIN — OXYCODONE HYDROCHLORIDE 5 MILLIGRAM(S): 5 TABLET ORAL at 22:40

## 2020-07-23 RX ADMIN — Medication 1 TABLET(S): at 10:17

## 2020-07-23 RX ADMIN — Medication 100 MILLIGRAM(S): at 10:17

## 2020-07-23 RX ADMIN — OXYCODONE HYDROCHLORIDE 5 MILLIGRAM(S): 5 TABLET ORAL at 22:55

## 2020-07-23 RX ADMIN — CHLORHEXIDINE GLUCONATE 15 MILLILITER(S): 213 SOLUTION TOPICAL at 22:30

## 2020-07-23 RX ADMIN — OXYCODONE HYDROCHLORIDE 5 MILLIGRAM(S): 5 TABLET ORAL at 10:20

## 2020-07-23 RX ADMIN — Medication 1 MILLIGRAM(S): at 00:01

## 2020-07-23 RX ADMIN — Medication 500 MILLIGRAM(S): at 10:16

## 2020-07-23 RX ADMIN — Medication 1 MILLIGRAM(S): at 22:30

## 2020-07-23 RX ADMIN — OXYCODONE HYDROCHLORIDE 5 MILLIGRAM(S): 5 TABLET ORAL at 11:20

## 2020-07-23 RX ADMIN — Medication 2.5 MILLIGRAM(S): at 00:21

## 2020-07-23 RX ADMIN — FAMOTIDINE 20 MILLIGRAM(S): 10 INJECTION INTRAVENOUS at 22:30

## 2020-07-23 RX ADMIN — Medication 1 APPLICATION(S): at 10:25

## 2020-07-23 RX ADMIN — Medication 0.1 MILLIGRAM(S): at 14:44

## 2020-07-23 RX ADMIN — ENOXAPARIN SODIUM 40 MILLIGRAM(S): 100 INJECTION SUBCUTANEOUS at 10:17

## 2020-07-23 RX ADMIN — Medication 0.1 MILLIGRAM(S): at 00:32

## 2020-07-23 RX ADMIN — CHLORHEXIDINE GLUCONATE 15 MILLILITER(S): 213 SOLUTION TOPICAL at 10:18

## 2020-07-23 RX ADMIN — Medication 0.1 MILLIGRAM(S): at 06:17

## 2020-07-23 RX ADMIN — Medication 100 MILLIGRAM(S): at 22:30

## 2020-07-23 RX ADMIN — Medication 0.1 MILLIGRAM(S): at 06:19

## 2020-07-23 RX ADMIN — Medication 1 MILLIGRAM(S): at 10:17

## 2020-07-23 RX ADMIN — Medication 1 MILLIGRAM(S): at 10:20

## 2020-07-23 NOTE — CHART NOTE - NSCHARTNOTEFT_GEN_A_CORE
Assessment:   *38 yo F admitted 6/24 presented with MRSA bacteremia, NATALIA, transaminitis, metabolic lactic acidosis,   respiratory failure requiring intubation now s/p trach (7/13), septic shock 2/2 MRSA bacteremia and  Fungemia, treated. The patient was on trach collar and was transferred to [facility] awaiting placement on 7/22 pm redeveloped acute respiratory failure, placed back on vent with fever.    *last labs on 7/20; noted pending collection for CMP.    *urine output: 220mL plus 4 unmeasured amounts.  BM (+) 7/22.  (+3) generalized, Lt/Rt hand edema.    *pt transferred overnight unable to determine adequacy of TF infusion prior to transfer.  Current Rx will provide ~980Kcal and 60g protein.  Which meets ~71% of estimated calorie needs and 68% of estimated protein needs.  Based on current needs; rec'd change TF to Jevity 1.5 @ 50mL/hr with 3pkts Prosource TF.  Which will provide ~1620Kcal, 97g protein, 760mL free water, and total TF volume of 1000mL.     Recommendations:  1) change TF to Jevity 1.5 @ 50mL/hr with 3pkts Prosource TF.  2) monitor hydration status; consider additional free water flushes of 30mL q1hr (=600mL additional free water)  3) monitor TF tolerance; keep back of bed > 35 degrees  4) weekly wt checks to track/trend changes      Diet Prescription: Diet, NPO with Tube Feed:   Tube Feeding Modality: Orogastric  Osmolite 1.5 (OSMOLITE1.5)  Total Volume for 24 Hours (mL): 720  Continuous  Until Goal Tube Feed Rate (mL per Hour): 30  Tube Feed Duration (in Hours): 24  Tube Feed Start Time: 21:00  Prosource Gelatein Plus     Qty per Day:  2 (07-10-20 @ 20:49)      Wt Hx:  110.6# (7/22)[BMI 18.6]  128.3# (7/15)  121.6# (7/7)  6/28- 125.2# Admission weight 6/25- 116.6#   Height (cm): 165.1 (06-23-20 @ 20:37)  Weight (kg): 54.4 (06-23-20 @ 20:37)  BMI (kg/m2): 20 (06-23-20 @ 20:37)      07-22-20 @ 07:01  -  07-23-20 @ 07:00  --------------------------------------------------------  IN: 1390 mL / OUT: 220 mL / NET: 1170 mL        Estimated Needs:   Weight Used for Calculation: 57Kg (IBW for calories and current BW for protein)    Estimated Energy Needs ( 25-30 calories/kg): 4081-4226 calories  Estimated Protein Needs (1.8-2 gm/kg): 90-100gm protein  Estimated Fluid Needs (<20 ml/kg):1000ml          Pertinent Labs:  07-20 Alb 1.1 g/dL<L>        Skin: john paul score = 12  unstageable PU on sacrum    Monitoring and Evaluation:   [x] PO intake/Nutr support infusion [ x ] Tolerance to Nutr [ x ] weights [ x ] labs[ x ] follow up per protocol  [ ] other:

## 2020-07-23 NOTE — PROGRESS NOTE ADULT - SUBJECTIVE AND OBJECTIVE BOX
Subjective:  Pt seen, on vent. Had RRT overnight, hypoxic, tachycardic.     Vital Signs:  Vital Signs Last 24 Hrs  T(C): 37.4 (07-23-20 @ 04:00), Max: 38.8 (07-22-20 @ 22:15)  T(F): 99.3 (07-23-20 @ 04:00), Max: 101.9 (07-22-20 @ 22:15)  HR: 107 (07-23-20 @ 10:00) (102 - 153)  BP: 122/93 (07-23-20 @ 10:00) (106/77 - 151/97)  RR: 23 (07-23-20 @ 10:00) (17 - 47)  SpO2: 100% (07-23-20 @ 10:00) (91% - 100%) on (O2)    Telemetry/Alarms:    Relevant labs, radiology and Medications reviewed            MEDICATIONS  (STANDING):  ascorbic acid 500 milliGRAM(s) Oral daily  chlorhexidine 0.12% Liquid 15 milliLiter(s) Oral Mucosa every 12 hours  clonazePAM  Tablet 1 milliGRAM(s) Oral two times a day  cloNIDine 0.1 milliGRAM(s) Oral every 8 hours  collagenase Ointment 1 Application(s) Topical daily  Dakins Solution - 1/4 Strength 1 Application(s) Topical daily  doxycycline hyclate Capsule 100 milliGRAM(s) Oral every 12 hours  enoxaparin Injectable 40 milliGRAM(s) SubCutaneous daily  famotidine    Tablet 20 milliGRAM(s) Oral two times a day  folic acid 1 milliGRAM(s) Oral daily  melatonin 5 milliGRAM(s) Oral at bedtime  multivitamin 1 Tablet(s) Oral daily  silver sulfADIAZINE 1% Cream 1 Application(s) Topical daily  thiamine 100 milliGRAM(s) Oral daily    MEDICATIONS  (PRN):  acetaminophen   Tablet .. 650 milliGRAM(s) Oral every 4 hours PRN Temp greater or equal to 38C (100.4F), Mild Pain (1 - 3)  glucagon  Injectable 1 milliGRAM(s) IntraMuscular once PRN Glucose <70 milliGRAM(s)/deciLiter  hydrOXYzine hydrochloride 50 milliGRAM(s) Oral every 4 hours PRN Anxiety  loperamide 2 milliGRAM(s) Oral every 4 hours PRN Loose stool  oxyCODONE    IR 5 milliGRAM(s) Oral every 6 hours PRN Moderate Pain (4 - 6)      Physical exam  Gen NAD  Neuro awake   Card RRR  Pulm equal b/l  Neck- trach midline  Abd soft, PEG in place  Ext warm, Dressings on RUE, b/l LE C/D/I        I&O's Summary    22 Jul 2020 07:01  -  23 Jul 2020 07:00  --------------------------------------------------------  IN: 1390 mL / OUT: 220 mL / NET: 1170 mL        Assessment  39y Female  w/ PAST MEDICAL & SURGICAL HISTORY:  Smoker  Heroin abuse  H/O Raynaud's syndrome  Rheumatoid arthritis  Lupus  Gall bladder stones  H/O appendicitis  H/O tubal ligation  admitted with complaints of Patient is a 39y old  Female who presents with a chief complaint of Cellulitis with sepsis, symptomatic anemia. (23 Jul 2020 09:57)  .  39y Female h/o lupus, RA, epilepsy (last seizure 8 yrs ago), chronic pain, IVDA admitted w weakness, failure to thrive. Found to have acute anemia, NATALIA, transaminitis, metabolic lactic acidosis, severe dehydration, severe protein calorie malnutrition. And severe sepsis with septic shock secondary to MRSA bacteremia and UTI treated with a two-week course of Vancomycin, developed worsening respiratory failure and was ultimately intubated on 6/27/20.  s/p tracheostomy 7/13 currently being treated for fungemia. POD 7 PEG. 7/22 w respiratory distress, now on vent.    PEG care    Discussed with Cardiothoracic Team at AM rounds.

## 2020-07-23 NOTE — PROGRESS NOTE ADULT - ASSESSMENT
Patient s/p MRSA bacteremia  s/ Fungemia  now with clear cxr and recurrent fever  with respiratory failure  also with some abdominal pain    will send labs  alctate blood cultures  will ct chest/abdomen pelvis to r/o occult cause of infection

## 2020-07-23 NOTE — PROGRESS NOTE ADULT - MUSCULOSKELETAL COMMENTS
multiple abrasion, trackk marks
right 1st toes with visible tendon, clean ulcer on left foot no gross purulence

## 2020-07-23 NOTE — PROGRESS NOTE ADULT - SUBJECTIVE AND OBJECTIVE BOX
Events Overnight: Patient was transferred to ICU with fever to 101.9 and tachypneic            CXR no acute infiltrate. seems to have some inc abdominal pain    HPI:        40 yo F admitted 6/24 presented with MRSA bacteremia, NATALIA, transaminitis, metabolic lactic acidosis,   respiratory failure requiring intubation now s/p trach (7/13), septic shock 2/2 MRSA bacteremia and         Fungemia, treated. The patient was on trach collar and [facility]       on 7/22 pm redeveloped acute respiratory failure, placed back on vent with fever.    ROS - difficult to obtain as patient is intubated, but has abdominal pain     MEDICATIONS  (STANDING):  ascorbic acid 500 milliGRAM(s) Oral daily  chlorhexidine 0.12% Liquid 15 milliLiter(s) Oral Mucosa every 12 hours  clonazePAM  Tablet 1 milliGRAM(s) Oral two times a day  cloNIDine 0.1 milliGRAM(s) Oral every 8 hours  collagenase Ointment 1 Application(s) Topical daily  Dakins Solution - 1/4 Strength 1 Application(s) Topical daily  doxycycline hyclate Capsule 100 milliGRAM(s) Oral every 12 hours  enoxaparin Injectable 40 milliGRAM(s) SubCutaneous daily  famotidine    Tablet 20 milliGRAM(s) Oral two times a day  folic acid 1 milliGRAM(s) Oral daily  melatonin 5 milliGRAM(s) Oral at bedtime  multivitamin 1 Tablet(s) Oral daily  silver sulfADIAZINE 1% Cream 1 Application(s) Topical daily  thiamine 100 milliGRAM(s) Oral daily    MEDICATIONS  (PRN):  acetaminophen   Tablet .. 650 milliGRAM(s) Oral every 4 hours PRN Temp greater or equal to 38C (100.4F), Mild Pain (1 - 3)  glucagon  Injectable 1 milliGRAM(s) IntraMuscular once PRN Glucose <70 milliGRAM(s)/deciLiter  hydrOXYzine hydrochloride 50 milliGRAM(s) Oral every 4 hours PRN Anxiety  loperamide 2 milliGRAM(s) Oral every 4 hours PRN Loose stool  oxyCODONE    IR 5 milliGRAM(s) Oral every 6 hours PRN Moderate Pain (4 - 6)    ICU Vital Signs Last 24 Hrs  T(C): 37.4 (23 Jul 2020 04:00), Max: 38.8 (22 Jul 2020 22:15)  T(F): 99.3 (23 Jul 2020 04:00), Max: 101.9 (22 Jul 2020 22:15)  HR: 102 (23 Jul 2020 08:00) (102 - 153)  BP: 112/82 (23 Jul 2020 08:00) (106/77 - 151/97)  BP(mean): 89 (23 Jul 2020 08:00) (83 - 114)  ABP: --  ABP(mean): --  RR: 20 (23 Jul 2020 08:00) (17 - 47)  SpO2: 100% (23 Jul 2020 08:00) (91% - 100%)    Mode: AC/ CMV (Assist Control/ Continuous Mandatory Ventilation)  RR (machine): 14  TV (machine): 400  FiO2: 40  PEEP: 5  ITime: 1  PIP: 22      I&O's Summary    22 Jul 2020 07:01  -  23 Jul 2020 07:00  --------------------------------------------------------  IN: 1390 mL / OUT: 220 mL / NET: 1170 mL    DVT Prophylaxis:  Lovenox                                                               Advanced Directives: Full code

## 2020-07-23 NOTE — PROGRESS NOTE ADULT - ASSESSMENT
Assessment: 38 y/o female seen by podiatry at bedside in the ICU for the followin.) Full Thickness Fibronecrotic Ulcer of Right fifth digit  2.) Full Thickness Fibronecrotic Ulcer with exposed peroneal tendon, lateral aspect RLE  3.) Full thickness ulcer with exposed Achilles tendon LT  4.) Full thickness ulcer to lateral malleolus, LLE  5) Full thickness ulceration, dorsum of left 1st MTPJ  6) Partial Thickness ulcer 4th interspace, Lt foot  7) Full thickness ulceration, upper 1/3 aspect of left leg at level of fibular notch  8) Non gradable Eschar of 3rd digit, Lt foot  9) Multiple hyperpigmented scar-like lesions scattered all over LE, bl  10) Serous blister, medial malleolus L side  11) Full thickness ulceration, left foot plantar sub metatarsal 5 region  12) Pain in B/L Lower Extremities     Plan:  - Chart reviewed and patient evaluated by Podiatry team.   - X-rays of the b/l LEs reviewed at this time. Case discussed with radiologist in depth. Please note oval radiolucencies visualized in the b/l LEs X-rays are due to air in exposed open wounds.   - Final wound Cx. left 1st MTPJ full-thickness ulceration, reviewed  - All wounds to b/l LEs irrigated with copious amounts of sterile saline.  - SSD and adaptic applied over the right dorsolateral forefoot ulceration.   - Saline-moistened Adaptic applied over exposed peroneal tendons to RLE.   - Adaptic applied over exposed Achilles tendon of LLE.   - Silvadene applied to wounds of L 3rd digit and L lateral malleolus ulceration.  - Silvadene applied to full-thickness ulceration left foot submet 5 region.   - The b/l feet and ankles were wrapped with 4x4 gauze, ABD pad and kerlix, then secured with tape.   - Patient's right and left heels to be offloaded in b/l Z-flex boots to be worn at all times when patient bedbound.  - Vascular Consult, appreciated.   - Discussed case with ID department given purulence exuded from full thickness ulceration to left 1st MPJ and that deep wound cx was taken, Abx per ID appreciated.  - Discussed with CCU doctor the case, and recommendations, appreciated. Discussed case with Medicine department as well now that patient on Med/Surg inpatient unit.  - Discussed findings of full-thickness ulceration at level of left fibular notch with Vascular surgery and Wound care nursing departments. Local wound care of full-thickness ulceration at level of left fibular notch per Wound care nursing department in addition to care of patient's sacral ulceration, appreciated.  - No podiatric surgical intervention necessary at this time as no abscess collections found to b/l lower extremities   - At this time, podiatry will provide supportive, and noninvasive wound care to the wounds, b/l lower extremities, given the fact that the patient is deemed in non stable medically and at risk for acute decompensation.  - Per  patient planned for D/C to rehab facility tomorrow 2020. When patient is deemed stable for discharge by all other medical specialties, patient instructed to f/u at the [facility] for continued podiatric care. Daily local wound care instructions outlined below.   - Nutrition supplementation per Dietician given albumin level decreased, appreciated.   - Podiatry will follow the case closely while in house.     DAILY LOCAL WOUND CARE INSTRUCTIONS  Please irrigate all wounds to b/l LEs copious amounts of sterile saline.  - Apply Silvadene and adaptic over the right dorsolateral forefoot ulceration.   - Apply Saline-moistened Adaptic over exposed peroneal tendons to right leg.   - Apply Adaptic over exposed Achilles tendon of Left lower extremity.   - Apply Silvadene to wounds of Left 3rd digit and Left lateral malleolus ulceration.  - Apply Silvadene to the full-thickness ulceration to the bottom of the left foot underneath the left fifth toe.  - Lastly, wrap the bilateral lower extremities with 4x4 gauze, ABD pads and kerlix and then secure with tape.

## 2020-07-23 NOTE — PROGRESS NOTE ADULT - SUBJECTIVE AND OBJECTIVE BOX
Date of service: 7/23/2020  Chief Complaint: B/L lower extremity wounds     HPI: Pt was examined at bedside by podiatry with attending present, for B/L lower extremity ulcerative lesions. Pt is a 40 yo female who has been admitted to the CCU for septic shock, UTI with MRSA and bacteremia. Noted Pt is currently intubated and non verbal, she is alert and oriented. The patient is also being treated for anemia, acute respiratory failure and toxic metabolic encephalopathy. Of Note, Pt has a history of IVDA drug use and is critically ill and at risk for acute decompensation possible death per the intensivist. Due to the patient being intubated all additional medical history and information was gathered from the patients chart.    7/23: Patient seen and examined by podiatry for follow-up evaluation of right and left lower extremity wounds. Patient noted to be moved to ICU from [facility] for tachycardia, fever and respiratory distress. Patient noted be arousable at bedside and patient s/p tracheostomy procedure performed on 7/13/2020 and PEG placed on 7/16/2020. Patient noted to be responsive to external stimuli when her lower extremities are evaluated by Podiatry. All further HPI obtained via patient's chart.    REVIEW OF SYSTEMS:  Unable to assess due to pt's inability to communicate per intubation    Allergies:  morphine (Unknown)    Past Medical History:  Lupus, RA, Reynard's Epilepsy, IVDA     Family History:  No pertinent family history in first degree relatives: cannot recall family history at this time    Social History:  lives alone  single  smokes 4 cig/day  uses two bags of heroin a day (24 Jun 2020 01:21)    Vitals  ICU Vital Signs Last 24 Hrs  T(C): 37.4 (23 Jul 2020 04:00), Max: 38.8 (22 Jul 2020 22:15)  T(F): 99.3 (23 Jul 2020 04:00), Max: 101.9 (22 Jul 2020 22:15)  HR: 107 (23 Jul 2020 10:00) (102 - 153)  BP: 122/93 (23 Jul 2020 10:00) (106/77 - 151/97)  BP(mean): 100 (23 Jul 2020 10:00) (83 - 114)  RR: 23 (23 Jul 2020 10:00) (17 - 47)  SpO2: 100% (23 Jul 2020 10:00) (91% - 100%)      Physical examination:  Constitutional: Pt intubated, lying in bed    Focused LE examination:   Vasc: DP and PT pulses non palpable b/l, CFT < 5 x 10, TG: warm to warm  Neuro and MSK: Unable to assess due to patient's condition    Derm:  Noted Multiple hyperpigmented scar-like lesions scattered all over LE, bl    Rt LE  - Full thickness ulcer with exposed peroneal tendons on lateral aspect of distal RLE that + probes to Fibula, no drainage, + undermining all around the clock, + tenderness. Negative crepitus or fluctuance to area of lateral aspect of the distal RLE.  - Rt 5th toe demonstrates a full thickness with less fibronecotic wound bed and evidence of increasing granulation tissue compared to the prior examination, measuring approximately 7.2 x 4.6 x 0.8 cm, - purulence, - malodor, - fluctuance +Probe to 5th metatarsal head, EDL tendon exposed and + undermining from the medial aspect, + tunneling to the 9 hour.     Lt LE:  -Full thickness ulcer round in shape and approx  0.5 cm distal to lateral malleolus, tunneling to 3 and 6 o'clock positions, + undermining all around, - drainage, - fluctuance - malodor, + probe to bone to the lateral malleolus, - purulence; negative crepitus and - fluctuance  - Multiple Full thickness ulcerations with exposed Achilles tendon noted to the posterior surface of the left distal 1/3 aspect of the left leg.  -Swelling and mild edema noted over the medial malleolus of LLE. Intact serous blister noted to the medial malleolar region. Edema noted to the dorsal-lateral aspect of the left fifth MPJ, noted to be mostly likely pressure induced; area of black-blue discoloration noted to the the left fifth sub met 5 region.   -Partial thickness ulcer, 4th interdigital space with presence of dry blood, - probe to bone, - undermining - tunneling  - Non gradable eschar on 3rd digit on Lt foot  - Full thickness ulceration noted to the dorsum of the left 1st MPJ. Positive probing to the left 1st MPJ. Positive purulence exuded from the wound with milking of the left foot.  - Full thickness ulceration noted to the lateral aspect of the upper 1/3 aspect of the leg at the level of the fibular notch. + tendon exposed; no drainage with palpation of the periwound area; no malodor exuded from the wound, negative fluctuance and negative crepitus to the area. Full thickness ulceration L fibular notch area noted to be covered with Alleyvn pad.   - Full thickness ulceration noted to plantar aspect of left foot sub metatarsal 5 region. + Serous drainage noted with milking of the left foot. - fluctuance or crepitus felt to the sub met 5 left foot. No malodor exuded from drainage to the left foot. - probe to bone, - undermining and - tunneling.       Labs:  Complete Blood Count in AM (07.20.20 @ 07:59)    WBC Count: 10.60 K/uL    RBC Count: 2.86 M/uL    Hemoglobin: 8.1 g/dL    Hematocrit: 26.9 %    Mean Cell Volume: 94.1 fl    Mean Cell Hemoglobin: 28.3 pg    Mean Cell Hemoglobin Conc: 30.1 gm/dL    Red Cell Distrib Width: 15.1 %    Platelet Count - Automated: 553 K/uL  Basic Metabolic Panel in AM (07.18.20 @ 11:52)    Sodium, Serum: 136 mmol/L    Potassium, Serum: 4.9 mmol/L    Chloride, Serum: 109 mmol/L    Carbon Dioxide, Serum: 19 mmol/L    Anion Gap, Serum: 8 mmol/L    Blood Urea Nitrogen, Serum: 30 mg/dL    Creatinine, Serum: 0.91 mg/dL    Glucose, Serum: 90 mg/dL    Calcium, Total Serum: 7.8 mg/dL    eGFR if Non : 80: Interpretative comment  The units for eGFR are mL/min/1.73M2 (normalized body surface area). The  eGFR is calculated from a serum creatinine using the CKD-EPI equation.  Other variables required for calculation are race, age and sex. Among  patients with chronic kidney disease (CKD), the eGFR is useful in  determining the stage of disease according to KDOQI CKD classification.  All eGFR results are reported numerically with the following  interpretation.          GFR                    With                 Without     (ml/min/1.73 m2)    Kidney Damage       Kidney Damage        >= 90                    Stage 1                     Normal        60-89                    Stage 2                     Decreased GFR        30-59     Stage 3                     Stage 3        15-29                    Stage 4                     Stage 4        < 15                      Stage 5                     Stage 5  Each stage of CKD assumes that the associated GFR level has been in  effect for at least 3 months. Determination of stages one and two (with  eGFR > 59 ml/min/m2) requires estimation of kidney damage for at least 3  months as defined by structural or functional abnormalities.  Limitations: All estimates of GFR will be less accurate for patients at  extremes of muscle mass (including but not limited to frail elderly,  critically ill, or cancer patients), those with unusual diets, and those  with conditions associated with reduced secretion or extrarenal  elimination of creatinine. The eGFR equation is not recommended for use  in patients with unstable creatinine levels. mL/min/1.73M2    eGFR if African American: 93 mL/min/1.73M2           Culture - Blood in AM (07.06.20 @ 14:30)    Gram Stain:   Growth in aerobic bottle: Yeast like cells    -  5-Flucytosine: N/I For Candida species, no interpretation available for any invitro susceptibility testing.    -  Amphotericin B: N/I 0.25    -  Andulafungin: N/I 0.12    -  Caspofungin: N/I 0.06 CASPOFUNGIN: is indicated in the treatment of candidemia, intra-abdominal abscess, peritonitis, pleural space infections and esophageal candidiasis.    -  Fluconazole: N/I <=0.12 Fluconazole = Data established for mucosal disease, and is generally applicable for invasive disease.  Susceptibility is dependent on achieving the maximal possible blood level.  Doses of 400 MG/day or more may be required in adults with normal renalfunction and body habitus.    -  Itraconazole: N/I For Candida species, no interpretation available for any invitro susceptibility testing.    -  Micafungin: N/I 0.015    -  Posaconazole: N/I 0.015    -  Voriconazole: N/I <=0.008 VORICONAZOLE: The KRYSTEN has been determined by the colormetric microdilution method.    -  Multidrug (KPC pos) resistant organism: Nondet    -  Staphylococcus aureus: Nondet    -  Methicillin resistant Staphylococcus aureus (MRSA): Nondet    -  Coagulase negative Staphylococcus: Nondet    -  Enterococcus species: Nondet    -  Vancomycin resistant Enterococcus sp.: Nondet    -  Escherichia coli: Nondet    -  Klebsiella oxytoca: Nondet    -  Klebsiella pneumoniae: Nondet    -  Serratia marcescens: Nondet    -  Haemophilus influenzae: Nondet    -  Listeria monocytogenes: Nondet    -  Neisseria meningitidis: Nondet    -  Pseudomonas aeruginosa: Nondet    -  Acinetobacter baumanii: Nondet    -  Enterobacter cloacae complex: Nondet    -  Streptococcus sp. (Not Grp A, B or S pneumoniae): Nondet    -  Streptococcus agalactiae (Group B): Nondet    -  Streptococcus pyogenes (Group A): Nondet    -  Streptococcus pneumoniae: Nondet    -  Candida albicans: Nondet    -  Candida glabrata: Nondet    -  Candida krusei: Nondet    -  Candida parapsilosis: Nondet    -  Candida tropicalis: Nondet    -  Interpretation: See note This test method is not approved by the FDA and is for research use only.  The performance characteristics of this assay may have been determined by [facility], [place], [place]                            N/I - No interpretation available                                                         SDD – Sensitive Dose Dependent    Specimen Source: .Blood Blood-Peripheral    Organism: Blood Culture PCR    Organism: Brittny dubliniensis    Culture Results:   Growth in aerobic bottle: Brittny dubliniensis  "Due to technical problems, Proteus sp. will Not be reported as part of  the BCID panel until further notice"  ***Blood Panel PCR results on this specimen are available  approximately 3 hours after the Gram stain result.***  Gram stain, PCR, and/or culture results may not always  correspond due to difference in methodologies.  ************************************************************  This PCR assay was performed using [employer].  The following targets are tested for: Enterococcus,  vancomycin resistant enterococci, Listeria monocytogenes,  coagulase negative staphylococci, S. aureus,  methicillin resistant S. aureus, Streptococcus agalactiae  (Group B), S. pneumoniae, S. pyogenes (Group A),  Acinetobacter baumannii, Enterobacter cloacae, E. coli,  Klebsiella oxytoca, K. pneumoniae, Proteus sp.,  Serratia marcescens, Haemophilus influenzae,  Neisseria meningitidis, Pseudomonas aeruginosa, Candida  albicans, C. glabrata, C krusei, C parapsilosis,  C. tropicalis and the KPC resistance gene.    Organism Identification: Blood Culture PCR  Candida dubliniensis    Method Type: PCR    Method Type: YSTMIC      Imaging:    < from: US Duplex Arterial Lower Ext Compl, Bilateral (07.14.20 @ 14:49) >  IMPRESSION:  No evidence of a hemodynamically significant stenosis or occlusion in the visualized lower extremity arteries.  Subcutaneous edema throughout both lower extremities with fluid collections in both groins.  Monophasic waveforms throughout both lower extremities.  < end of copied text >      < from: TTE Echo Complete w/o Contrast w/ Doppler (06.27.20 @ 08:58) >   Impression   Summary   The mid to apical anterolateral to anterior segments are severely   hypokinetic. The apical inferoseptal wall appears hypokinetic.   Estimated left ventricular ejection fraction is 45-50 %.   A catheter wire is seen in the right atrium.   Mild (1+) tricuspid valve regurgitation is present.   Mild pulmonary hypertension.  < end of copied text >      < from: Xray Ankle Complete 3 Views, Bilateral (07.09.20 @ 16:42) >  EXAM:  XR ANKLE COMP MIN 3 VIEWS BI                        *** ADDENDUM 07/10/2020  ***                                                           Addendum:    The posterior soft tissue gas of the left ankle may be related to skin ulcerationor early fistulous tract formation. Clinical correlation is suggested.  *** END OF ADDENDUM 07/10/2020  ***  PROCEDURE DATE:  07/09/2020    INTERPRETATION:  Bilateral ankles  HISTORY: Bilateral ulcers    Three views of the right ankle and three views of the left ankle show no evidence of fracture nor destructive change. The joint spaces are maintained.  Subcutaneous soft tissue gas is present behind the left ankle.  IMPRESSION: Soft tissue gas as noted.  Thank you for this referral  ***Please see the addendum at the top of this report. It may contain additional important information or changes.****  < end of copied text >

## 2020-07-24 LAB
CULTURE RESULTS: SIGNIFICANT CHANGE UP
SPECIMEN SOURCE: SIGNIFICANT CHANGE UP

## 2020-07-24 PROCEDURE — 71045 X-RAY EXAM CHEST 1 VIEW: CPT | Mod: 26

## 2020-07-24 PROCEDURE — 99291 CRITICAL CARE FIRST HOUR: CPT

## 2020-07-24 PROCEDURE — 99232 SBSQ HOSP IP/OBS MODERATE 35: CPT

## 2020-07-24 RX ORDER — CEFEPIME 1 G/1
INJECTION, POWDER, FOR SOLUTION INTRAMUSCULAR; INTRAVENOUS
Refills: 0 | Status: DISCONTINUED | OUTPATIENT
Start: 2020-07-24 | End: 2020-07-30

## 2020-07-24 RX ORDER — CEFEPIME 1 G/1
2000 INJECTION, POWDER, FOR SOLUTION INTRAMUSCULAR; INTRAVENOUS ONCE
Refills: 0 | Status: COMPLETED | OUTPATIENT
Start: 2020-07-24 | End: 2020-07-24

## 2020-07-24 RX ORDER — CEFEPIME 1 G/1
2000 INJECTION, POWDER, FOR SOLUTION INTRAMUSCULAR; INTRAVENOUS EVERY 12 HOURS
Refills: 0 | Status: DISCONTINUED | OUTPATIENT
Start: 2020-07-24 | End: 2020-07-30

## 2020-07-24 RX ADMIN — Medication 650 MILLIGRAM(S): at 21:47

## 2020-07-24 RX ADMIN — Medication 0.1 MILLIGRAM(S): at 05:49

## 2020-07-24 RX ADMIN — Medication 1 MILLIGRAM(S): at 10:17

## 2020-07-24 RX ADMIN — Medication 1 APPLICATION(S): at 10:19

## 2020-07-24 RX ADMIN — CEFEPIME 100 MILLIGRAM(S): 1 INJECTION, POWDER, FOR SOLUTION INTRAMUSCULAR; INTRAVENOUS at 10:18

## 2020-07-24 RX ADMIN — CHLORHEXIDINE GLUCONATE 15 MILLILITER(S): 213 SOLUTION TOPICAL at 21:47

## 2020-07-24 RX ADMIN — Medication 650 MILLIGRAM(S): at 06:49

## 2020-07-24 RX ADMIN — Medication 1 TABLET(S): at 10:17

## 2020-07-24 RX ADMIN — Medication 500 MILLIGRAM(S): at 10:17

## 2020-07-24 RX ADMIN — CHLORHEXIDINE GLUCONATE 15 MILLILITER(S): 213 SOLUTION TOPICAL at 10:18

## 2020-07-24 RX ADMIN — CEFEPIME 100 MILLIGRAM(S): 1 INJECTION, POWDER, FOR SOLUTION INTRAMUSCULAR; INTRAVENOUS at 18:02

## 2020-07-24 RX ADMIN — FAMOTIDINE 20 MILLIGRAM(S): 10 INJECTION INTRAVENOUS at 21:47

## 2020-07-24 RX ADMIN — Medication 1 APPLICATION(S): at 13:43

## 2020-07-24 RX ADMIN — ENOXAPARIN SODIUM 40 MILLIGRAM(S): 100 INJECTION SUBCUTANEOUS at 10:16

## 2020-07-24 RX ADMIN — Medication 0.1 MILLIGRAM(S): at 13:52

## 2020-07-24 RX ADMIN — OXYCODONE HYDROCHLORIDE 5 MILLIGRAM(S): 5 TABLET ORAL at 17:53

## 2020-07-24 RX ADMIN — OXYCODONE HYDROCHLORIDE 5 MILLIGRAM(S): 5 TABLET ORAL at 10:15

## 2020-07-24 RX ADMIN — Medication 5 MILLIGRAM(S): at 21:47

## 2020-07-24 RX ADMIN — Medication 0.1 MILLIGRAM(S): at 21:47

## 2020-07-24 RX ADMIN — Medication 650 MILLIGRAM(S): at 22:45

## 2020-07-24 RX ADMIN — Medication 100 MILLIGRAM(S): at 10:17

## 2020-07-24 RX ADMIN — Medication 50 MILLIGRAM(S): at 18:09

## 2020-07-24 RX ADMIN — Medication 100 MILLIGRAM(S): at 21:47

## 2020-07-24 RX ADMIN — FAMOTIDINE 20 MILLIGRAM(S): 10 INJECTION INTRAVENOUS at 10:17

## 2020-07-24 RX ADMIN — OXYCODONE HYDROCHLORIDE 5 MILLIGRAM(S): 5 TABLET ORAL at 18:38

## 2020-07-24 RX ADMIN — Medication 1 MILLIGRAM(S): at 21:47

## 2020-07-24 RX ADMIN — OXYCODONE HYDROCHLORIDE 5 MILLIGRAM(S): 5 TABLET ORAL at 11:00

## 2020-07-24 RX ADMIN — Medication 100 MILLIGRAM(S): at 10:16

## 2020-07-24 RX ADMIN — Medication 1 APPLICATION(S): at 10:17

## 2020-07-24 RX ADMIN — Medication 650 MILLIGRAM(S): at 04:00

## 2020-07-24 NOTE — PROGRESS NOTE ADULT - SUBJECTIVE AND OBJECTIVE BOX
Date of service: 7/24/2020  Chief Complaint: B/L lower extremity wounds     HPI: Pt was examined at bedside by podiatry with attending present, for B/L lower extremity ulcerative lesions. Pt is a 40 yo female who has been admitted to the CCU for septic shock, UTI with MRSA and bacteremia. Noted Pt is currently intubated and non verbal, she is alert and oriented. The patient is also being treated for anemia, acute respiratory failure and toxic metabolic encephalopathy. Of Note, Pt has a history of IVDA drug use and is critically ill and at risk for acute decompensation possible death per the intensivist. Due to the patient being intubated all additional medical history and information was gathered from the patients chart.    7/24: Patient seen and examined by podiatry for follow-up evaluation of right and left lower extremity wounds with attending prsent. Patient noted to be moved to ICU from [facility] for tachycardia, fever and respiratory distress. Patient noted be arousable at bedside and patient s/p tracheostomy procedure performed on 7/13/2020 and PEG placed on 7/16/2020. Patient noted to be responsive to external stimuli when her lower extremities are evaluated by Podiatry. All further HPI obtained via patient's chart.    REVIEW OF SYSTEMS:  Unable to assess due to pt's inability to communicate per intubation    Allergies:  morphine (Unknown)    Past Medical History:  Lupus, RA, Reynard's Epilepsy, IVDA     Family History:  No pertinent family history in first degree relatives: cannot recall family history at this time    Social History:  lives alone  single  smokes 4 cig/day  uses two bags of heroin a day (24 Jun 2020 01:21)    Vitals:  T(C): 37.1 (24 Jul 2020 14:00), Max: 38.3 (24 Jul 2020 06:00)  T(F): 98.8 (24 Jul 2020 14:00), Max: 100.9 (24 Jul 2020 06:00)  HR: 94 (24 Jul 2020 16:00) (88 - 121)  BP: 123/87 (24 Jul 2020 16:00) (107/67 - 124/82)  BP(mean): 95 (24 Jul 2020 16:00) (77 - 95)  RR: 24 (24 Jul 2020 16:00) (17 - 24)  SpO2: 96% (24 Jul 2020 15:00) (90% - 100%)        Physical examination:  Constitutional: Pt intubated, lying in bed    Focused LE examination:   Vasc: DP and PT pulses non palpable b/l, CFT < 5 x 10, TG: warm to warm  Neuro and MSK: Unable to assess due to patient's condition    Derm:  Noted Multiple hyperpigmented scar-like lesions scattered all over LE, bl    Rt LE  - Full thickness ulcer with exposed peroneal tendons on lateral aspect of distal RLE that + probes to Fibula, no drainage, + undermining all around the clock, + tenderness. Negative crepitus or fluctuance to area of lateral aspect of the distal RLE.  - Rt 5th toe demonstrates a full thickness with less fibronecotic wound bed and evidence of increasing granulation tissue compared to the prior examination, measuring approximately 7.2 x 4.6 x 0.8 cm, - purulence, - malodor, - fluctuance +Probe to 5th metatarsal head, EDL tendon exposed and + undermining from the medial aspect, + tunneling to the 9 hour.     Lt LE:  -Full thickness ulcer round in shape and approx  0.5 cm distal to lateral malleolus, tunneling to 3 and 6 o'clock positions, + undermining all around, - drainage, - fluctuance - malodor, + probe to bone to the lateral malleolus, - purulence; negative crepitus and - fluctuance  - Multiple Full thickness ulcerations with exposed Achilles tendon noted to the posterior surface of the left distal 1/3 aspect of the left leg.  -Swelling and mild edema noted over the medial malleolus of LLE. Intact serous blister noted to the medial malleolar region. Edema noted to the dorsal-lateral aspect of the left fifth MPJ, noted to be mostly likely pressure induced; area of black-blue discoloration noted to the the left fifth sub met 5 region.   -Partial thickness ulcer, 4th interdigital space with presence of dry blood, - probe to bone, - undermining - tunneling  - Non gradable eschar on 3rd digit on Lt foot  - Full thickness ulceration noted to the dorsum of the left 1st MPJ. Positive probing to the left 1st MPJ. Positive purulence exuded from the wound with milking of the left foot.  - Full thickness ulceration noted to the lateral aspect of the upper 1/3 aspect of the leg at the level of the fibular notch. + tendon exposed; no drainage with palpation of the periwound area; no malodor exuded from the wound, negative fluctuance and negative crepitus to the area. Full thickness ulceration L fibular notch area noted to be covered with Alleyvn pad.   - Full thickness ulceration noted to plantar aspect of left foot sub metatarsal 5 region. + Serous drainage noted with milking of the left foot. - fluctuance or crepitus felt to the sub met 5 left foot. No malodor exuded from drainage to the left foot. - probe to bone, - undermining and - tunneling.       Labs:                        7.5    9.06  )-----------( 480      ( 23 Jul 2020 12:20 )             24.4   07-23    140  |  107  |  22  ----------------------------<  88  4.1   |  25  |  0.78    Ca    7.8<L>      23 Jul 2020 12:20  Phos  5.4     07-23  Mg     1.3     07-23    TPro  6.3  /  Alb  1.2<L>  /  TBili  0.3  /  DBili  x   /  AST  39<H>  /  ALT  21  /  AlkPhos  120  07-23           Culture - Blood in AM (07.06.20 @ 14:30)    Gram Stain:   Growth in aerobic bottle: Yeast like cells    -  5-Flucytosine: N/I For Candida species, no interpretation available for any invitro susceptibility testing.    -  Amphotericin B: N/I 0.25    -  Andulafungin: N/I 0.12    -  Caspofungin: N/I 0.06 CASPOFUNGIN: is indicated in the treatment of candidemia, intra-abdominal abscess, peritonitis, pleural space infections and esophageal candidiasis.    -  Fluconazole: N/I <=0.12 Fluconazole = Data established for mucosal disease, and is generally applicable for invasive disease.  Susceptibility is dependent on achieving the maximal possible blood level.  Doses of 400 MG/day or more may be required in adults with normal renalfunction and body habitus.    -  Itraconazole: N/I For Candida species, no interpretation available for any invitro susceptibility testing.    -  Micafungin: N/I 0.015    -  Posaconazole: N/I 0.015    -  Voriconazole: N/I <=0.008 VORICONAZOLE: The KRYSTEN has been determined by the colormetric microdilution method.    -  Multidrug (KPC pos) resistant organism: Nondet    -  Staphylococcus aureus: Nondet    -  Methicillin resistant Staphylococcus aureus (MRSA): Nondet    -  Coagulase negative Staphylococcus: Nondet    -  Enterococcus species: Nondet    -  Vancomycin resistant Enterococcus sp.: Nondet    -  Escherichia coli: Nondet    -  Klebsiella oxytoca: Nondet    -  Klebsiella pneumoniae: Nondet    -  Serratia marcescens: Nondet    -  Haemophilus influenzae: Nondet    -  Listeria monocytogenes: Nondet    -  Neisseria meningitidis: Nondet    -  Pseudomonas aeruginosa: Nondet    -  Acinetobacter baumanii: Nondet    -  Enterobacter cloacae complex: Nondet    -  Streptococcus sp. (Not Grp A, B or S pneumoniae): Nondet    -  Streptococcus agalactiae (Group B): Nondet    -  Streptococcus pyogenes (Group A): Nondet    -  Streptococcus pneumoniae: Nondet    -  Candida albicans: Nondet    -  Candida glabrata: Nondet    -  Candida krusei: Nondet    -  Candida parapsilosis: Nondet    -  Candida tropicalis: Nondet    -  Interpretation: See note This test method is not approved by the FDA and is for research use only.  The performance characteristics of this assay may have been determined by [facility] and [facility], [place], [place]                            N/I - No interpretation available                                                         SDD – Sensitive Dose Dependent    Specimen Source: .Blood Blood-Peripheral    Organism: Blood Culture PCR    Organism: Brittny dubliniensis    Culture Results:   Growth in aerobic bottle: Brittny dubliniensis  "Due to technical problems, Proteus sp. will Not be reported as part of  the BCID panel until further notice"  ***Blood Panel PCR results on this specimen are available  approximately 3 hours after the Gram stain result.***  Gram stain, PCR, and/or culture results may not always  correspond due to difference in methodologies.  ************************************************************  This PCR assay was performed using [employer].  The following targets are tested for: Enterococcus,  vancomycin resistant enterococci, Listeria monocytogenes,  coagulase negative staphylococci, S. aureus,  methicillin resistant S. aureus, Streptococcus agalactiae  (Group B), S. pneumoniae, S. pyogenes (Group A),  Acinetobacter baumannii, Enterobacter cloacae, E. coli,  Klebsiella oxytoca, K. pneumoniae, Proteus sp.,  Serratia marcescens, Haemophilus influenzae,  Neisseria meningitidis, Pseudomonas aeruginosa, Candida  albicans, C. glabrata, C krusei, C parapsilosis,  C. tropicalis and the KPC resistance gene.    Organism Identification: Blood Culture PCR  Candida dubliniensis    Method Type: PCR    Method Type: YSTMIC      Imaging:    < from: US Duplex Arterial Lower Ext Compl, Bilateral (07.14.20 @ 14:49) >  IMPRESSION:  No evidence of a hemodynamically significant stenosis or occlusion in the visualized lower extremity arteries.  Subcutaneous edema throughout both lower extremities with fluid collections in both groins.  Monophasic waveforms throughout both lower extremities.  < end of copied text >      < from: TTE Echo Complete w/o Contrast w/ Doppler (06.27.20 @ 08:58) >   Impression   Summary   The mid to apical anterolateral to anterior segments are severely   hypokinetic. The apical inferoseptal wall appears hypokinetic.   Estimated left ventricular ejection fraction is 45-50 %.   A catheter wire is seen in the right atrium.   Mild (1+) tricuspid valve regurgitation is present.   Mild pulmonary hypertension.  < end of copied text >      < from: Xray Ankle Complete 3 Views, Bilateral (07.09.20 @ 16:42) >  EXAM:  XR ANKLE COMP MIN 3 VIEWS BI                        *** ADDENDUM 07/10/2020  ***                                                           Addendum:    The posterior soft tissue gas of the left ankle may be related to skin ulcerationor early fistulous tract formation. Clinical correlation is suggested.  *** END OF ADDENDUM 07/10/2020  ***  PROCEDURE DATE:  07/09/2020    INTERPRETATION:  Bilateral ankles  HISTORY: Bilateral ulcers    Three views of the right ankle and three views of the left ankle show no evidence of fracture nor destructive change. The joint spaces are maintained.  Subcutaneous soft tissue gas is present behind the left ankle.  IMPRESSION: Soft tissue gas as noted.  Thank you for this referral  ***Please see the addendum at the top of this report. It may contain additional important information or changes.****  < end of copied text >

## 2020-07-24 NOTE — PROGRESS NOTE ADULT - MENTAL STATUS
awake, alerrt moves all extremities, nodding to questions
non focal generalized weakness
non focal
awake alert and agitated at time on vent
nodding to questions, alert awake
weak extremities
opens eyes when off sedation but not following commands; no sig purposeful movements

## 2020-07-24 NOTE — PROGRESS NOTE ADULT - ASSESSMENT
Assessment: 40 y/o female seen by podiatry at bedside in the ICU for the followin.) Full Thickness Fibronecrotic Ulcer of Right fifth digit  2.) Full Thickness Fibronecrotic Ulcer with exposed peroneal tendon, lateral aspect RLE  3.) Full thickness ulcer with exposed Achilles tendon LT  4.) Full thickness ulcer to lateral malleolus, LLE  5) Full thickness ulceration, dorsum of left 1st MTPJ  6) Partial Thickness ulcer 4th interspace, Lt foot  7) Full thickness ulceration, upper 1/3 aspect of left leg at level of fibular notch  8) Non gradable Eschar of 3rd digit, Lt foot  9) Multiple hyperpigmented scar-like lesions scattered all over LE, bl  10) Serous blister, medial malleolus L side  11) Full thickness ulceration, left foot plantar sub metatarsal 5 region  12) Pain in B/L Lower Extremities     Plan:  - Chart reviewed and patient evaluated by Podiatry team.   - X-rays of the b/l LEs reviewed at this time. Case discussed with radiologist in depth. Please note oval radiolucencies visualized in the b/l LEs X-rays are due to air in exposed open wounds.   - Final wound Cx. left 1st MTPJ full-thickness ulceration, reviewed  - All wounds to b/l LEs irrigated with copious amounts of sterile saline.  - SSD and adaptic applied over the right dorsolateral forefoot ulceration.   - Saline-moistened Adaptic applied over exposed peroneal tendons to RLE.   - Adaptic applied over exposed Achilles tendon of LLE.   - Silvadene applied to wounds of L 3rd digit and L lateral malleolus ulceration.  - Silvadene applied to full-thickness ulceration left foot submet 5 region.   - The b/l feet and ankles were wrapped with 4x4 gauze, ABD pad and kerlix, then secured with tape.   - Patient's right and left heels to be offloaded in b/l Z-flex boots to be worn at all times when patient bedbound.  - Vascular Consult, appreciated.   - Discussed case with ID department given purulence exuded from full thickness ulceration to left 1st MPJ and that deep wound cx was taken, Abx per ID appreciated.  - Discussed with CCU doctor the case, and recommendations, appreciated. Discussed case with Medicine department as well now that patient on Med/Surg inpatient unit.  - Discussed findings of full-thickness ulceration at level of left fibular notch with Vascular surgery and Wound care nursing departments. Local wound care of full-thickness ulceration at level of left fibular notch per Wound care nursing department in addition to care of patient's sacral ulceration, appreciated.  - No podiatric surgical intervention necessary at this time as no abscess collections found to b/l lower extremities   - At this time, podiatry will provide supportive, and noninvasive wound care to the wounds, b/l lower extremities, given the fact that the patient is deemed in non stable medically and at risk for acute decompensation.  - Per  patient planned for D/C to rehab facility tomorrow 2020. When patient is deemed stable for discharge by all other medical specialties, patient instructed to f/u at the [facility] for continued podiatric care. Daily local wound care instructions outlined below.   - Nutrition supplementation per Dietician given albumin level decreased, appreciated.   - Podiatry will follow the case closely while in house.     DAILY LOCAL WOUND CARE INSTRUCTIONS  Please irrigate all wounds to b/l LEs copious amounts of sterile saline.  - Apply Silvadene and adaptic over the right dorsolateral forefoot ulceration.   - Apply Saline-moistened Adaptic over exposed peroneal tendons to right leg.   - Apply Adaptic over exposed Achilles tendon of Left lower extremity.   - Apply Silvadene to wounds of Left 3rd digit and Left lateral malleolus ulceration.  - Apply Silvadene to the full-thickness ulceration to the bottom of the left foot underneath the left fifth toe.  - Lastly, wrap the bilateral lower extremities with 4x4 gauze, ABD pads and kerlix and then secure with tape.

## 2020-07-24 NOTE — PROGRESS NOTE ADULT - ASSESSMENT
Patient s/p MRSA bacteremia  s/p Fungemia  restarted on cefepime, awaiting ID of organism  with respiratory failure not tolerating weaning  cts done yesterday show only right lower lobe infiltrate  blood cultures negative  oob to chair  Lovenox - dvt prophylaxis

## 2020-07-24 NOTE — PROGRESS NOTE ADULT - SUBJECTIVE AND OBJECTIVE BOX
Subjective:  Pt seen, on vent. Pt had CT abdomen done yesterday, lower lung w left loculated effusion.     Vital Signs:  Vital Signs Last 24 Hrs  T(C): 37.2 (07-24-20 @ 10:00), Max: 38.3 (07-24-20 @ 06:00)  T(F): 98.9 (07-24-20 @ 10:00), Max: 100.9 (07-24-20 @ 06:00)  HR: 89 (07-24-20 @ 11:00) (88 - 121)  BP: 109/75 (07-24-20 @ 11:00) (107/67 - 130/89)  RR: 18 (07-24-20 @ 11:00) (17 - 24)  SpO2: 98% (07-24-20 @ 11:00) (90% - 100%) on (O2)    Telemetry/Alarms:    Relevant labs, radiology and Medications reviewed                        7.5    9.06  )-----------( 480      ( 23 Jul 2020 12:20 )             24.4     07-23    140  |  107  |  22  ----------------------------<  88  4.1   |  25  |  0.78    Ca    7.8<L>      23 Jul 2020 12:20  Phos  5.4     07-23  Mg     1.3     07-23    TPro  6.3  /  Alb  1.2<L>  /  TBili  0.3  /  DBili  x   /  AST  39<H>  /  ALT  21  /  AlkPhos  120  07-23      MEDICATIONS  (STANDING):  ascorbic acid 500 milliGRAM(s) Oral daily  cefepime   IVPB 2000 milliGRAM(s) IV Intermittent every 12 hours  cefepime   IVPB      chlorhexidine 0.12% Liquid 15 milliLiter(s) Oral Mucosa every 12 hours  clonazePAM  Tablet 1 milliGRAM(s) Oral two times a day  cloNIDine 0.1 milliGRAM(s) Oral every 8 hours  collagenase Ointment 1 Application(s) Topical daily  Dakins Solution - 1/4 Strength 1 Application(s) Topical daily  doxycycline hyclate Capsule 100 milliGRAM(s) Oral every 12 hours  enoxaparin Injectable 40 milliGRAM(s) SubCutaneous daily  famotidine    Tablet 20 milliGRAM(s) Oral two times a day  folic acid 1 milliGRAM(s) Oral daily  melatonin 5 milliGRAM(s) Oral at bedtime  multivitamin 1 Tablet(s) Oral daily  silver sulfADIAZINE 1% Cream 1 Application(s) Topical daily  thiamine 100 milliGRAM(s) Oral daily    MEDICATIONS  (PRN):  acetaminophen   Tablet .. 650 milliGRAM(s) Oral every 4 hours PRN Temp greater or equal to 38C (100.4F), Mild Pain (1 - 3)  glucagon  Injectable 1 milliGRAM(s) IntraMuscular once PRN Glucose <70 milliGRAM(s)/deciLiter  hydrOXYzine hydrochloride 50 milliGRAM(s) Oral every 4 hours PRN Anxiety  loperamide 2 milliGRAM(s) Oral every 4 hours PRN Loose stool  oxyCODONE    IR 5 milliGRAM(s) Oral every 6 hours PRN Moderate Pain (4 - 6)      Physical exam  Gen NAD  Neuro alert  Card RRR  neck- supple, trach midline  Pulm decreased bases  Abd soft, PEG in place  Ext warm, RUE and b/l LE w dressings C/D/I        I&O's Summary    23 Jul 2020 07:01  -  24 Jul 2020 07:00  --------------------------------------------------------  IN: 2578 mL / OUT: 950 mL / NET: 1628 mL        Assessment  39y Female  w/ PAST MEDICAL & SURGICAL HISTORY:  Smoker  Heroin abuse  H/O Raynaud's syndrome  Rheumatoid arthritis  Lupus  Gall bladder stones  H/O appendicitis  H/O tubal ligation  admitted with complaints of Patient is a 39y old  Female who presents with a chief complaint of Cellulitis with sepsis, symptomatic anemia. (24 Jul 2020 11:17)  .  39y Female h/o lupus, RA, epilepsy (last seizure 8 yrs ago), chronic pain, IVDA admitted w weakness, failure to thrive. Found to have acute anemia, NATALIA, transaminitis, metabolic lactic acidosis, severe dehydration, severe protein calorie malnutrition. And severe sepsis with septic shock secondary to MRSA bacteremia and UTI treated with a two-week course of Vancomycin, developed worsening respiratory failure and was ultimately intubated on 6/27/20.  s/p tracheostomy 7/13 currently being treated for fungemia. POD 7 PEG. 7/22 w respiratory distress, now on vent.    PEG care  will US left chest and obtain CXR to further evaluate left effusion, partially seen on CT abd/pelvis    Discussed with Cardiothoracic Team at AM rounds.

## 2020-07-24 NOTE — PROGRESS NOTE ADULT - SUBJECTIVE AND OBJECTIVE BOX
Events Overnight:  Patient with fever 100.9,  gram negative rods in sputum, ct showed left lower lobe infiltrate, back on vent.    HPI:     40 yo F admitted  presented with MRSA bacteremia, NATALIA, transaminitis, metabolic lactic acidosis,   respiratory failure requiring intubation now s/p trach (), septic shock 2/2 MRSA bacteremia and         Fungemia, treated. The patient was on trach collar and was transferred to [facility] awaiting placement       on  pm redeveloped acute respiratory failure, placed back on vent with fever.    ROS - difficult to obtain as patient is intubated,  less abdominal discomfort       MEDICATIONS  (STANDING):  ascorbic acid 500 milliGRAM(s) Oral daily  cefepime   IVPB 2000 milliGRAM(s) IV Intermittent every 12 hours  cefepime   IVPB      chlorhexidine 0.12% Liquid 15 milliLiter(s) Oral Mucosa every 12 hours  clonazePAM  Tablet 1 milliGRAM(s) Oral two times a day  cloNIDine 0.1 milliGRAM(s) Oral every 8 hours  collagenase Ointment 1 Application(s) Topical daily  Dakins Solution - 1/4 Strength 1 Application(s) Topical daily  doxycycline hyclate Capsule 100 milliGRAM(s) Oral every 12 hours  enoxaparin Injectable 40 milliGRAM(s) SubCutaneous daily  famotidine    Tablet 20 milliGRAM(s) Oral two times a day  folic acid 1 milliGRAM(s) Oral daily  melatonin 5 milliGRAM(s) Oral at bedtime  multivitamin 1 Tablet(s) Oral daily  silver sulfADIAZINE 1% Cream 1 Application(s) Topical daily  thiamine 100 milliGRAM(s) Oral daily    MEDICATIONS  (PRN):  acetaminophen   Tablet .. 650 milliGRAM(s) Oral every 4 hours PRN Temp greater or equal to 38C (100.4F), Mild Pain (1 - 3)  glucagon  Injectable 1 milliGRAM(s) IntraMuscular once PRN Glucose <70 milliGRAM(s)/deciLiter  hydrOXYzine hydrochloride 50 milliGRAM(s) Oral every 4 hours PRN Anxiety  loperamide 2 milliGRAM(s) Oral every 4 hours PRN Loose stool  oxyCODONE    IR 5 milliGRAM(s) Oral every 6 hours PRN Moderate Pain (4 - 6)                    ICU Vital Signs Last 24 Hrs  T(C): 38.3 (2020 06:00), Max: 38.3 (2020 06:00)  T(F): 100.9 (2020 06:00), Max: 100.9 (2020 06:00)  HR: 97 (2020 10:00) (88 - 121)  BP: 111/79 (2020 10:00) (107/67 - 130/89)  BP(mean): 86 (2020 10:00) (77 - 99)  ABP: --  ABP(mean): --  RR: 18 (2020 10:00) (17 - 24)  SpO2: 98% (2020 10:00) (90% - 100%)      Mode: AC/ CMV (Assist Control/ Continuous Mandatory Ventilation)  RR (machine): 14  TV (machine): 400  FiO2: 35  PEEP: 5  ITime: 0.88  MAP: 9  PIP: 20      I&O's Summary    2020 07:01  -  2020 07:00  --------------------------------------------------------  IN: 2578 mL / OUT: 950 mL / NET: 1628 mL                        7.5    9.06  )-----------( 480      ( 2020 12:20 )             24.4       07-23    140  |  107  |  22  ----------------------------<  88  4.1   |  25  |  0.78    Ca    7.8<L>      2020 12:20  Phos  5.4     07-23  Mg     1.3     07-23    TPro  6.3  /  Alb  1.2<L>  /  TBili  0.3  /  DBili  x   /  AST  39<H>  /  ALT  21  /  AlkPhos  120  07-23      Urinalysis Basic - ( 2020 15:55 )    Color: Yellow / Appearance: Slightly Turbid / S.015 / pH: x  Gluc: x / Ketone: Trace  / Bili: Negative / Urobili: Negative mg/dL   Blood: x / Protein: 30 mg/dL / Nitrite: Negative   Leuk Esterase: Trace / RBC: 6-10 /HPF / WBC 0-2   Sq Epi: x / Non Sq Epi: Occasional / Bacteria: Occasional      DVT Prophylaxis:    Lovenox                                                             Advanced Directives: Full Code

## 2020-07-24 NOTE — PROGRESS NOTE ADULT - SUBJECTIVE AND OBJECTIVE BOX
Date of service: 20 @ 09:30    Events noted  Febrile to 100.9F  Noted with respiratory secretions showing GNR on gram stain    ROS: poorly verbal    MEDICATIONS  (STANDING):  ascorbic acid 500 milliGRAM(s) Oral daily  cefepime   IVPB 2000 milliGRAM(s) IV Intermittent once  cefepime   IVPB 2000 milliGRAM(s) IV Intermittent every 12 hours  cefepime   IVPB      chlorhexidine 0.12% Liquid 15 milliLiter(s) Oral Mucosa every 12 hours  clonazePAM  Tablet 1 milliGRAM(s) Oral two times a day  cloNIDine 0.1 milliGRAM(s) Oral every 8 hours  collagenase Ointment 1 Application(s) Topical daily  Dakins Solution - 1/4 Strength 1 Application(s) Topical daily  doxycycline hyclate Capsule 100 milliGRAM(s) Oral every 12 hours  enoxaparin Injectable 40 milliGRAM(s) SubCutaneous daily  famotidine    Tablet 20 milliGRAM(s) Oral two times a day  folic acid 1 milliGRAM(s) Oral daily  melatonin 5 milliGRAM(s) Oral at bedtime  multivitamin 1 Tablet(s) Oral daily  silver sulfADIAZINE 1% Cream 1 Application(s) Topical daily  thiamine 100 milliGRAM(s) Oral daily    Vital Signs Last 24 Hrs  T(C): 38.3 (2020 06:00), Max: 38.3 (2020 06:00)  T(F): 100.9 (2020 06:00), Max: 100.9 (2020 06:00)  HR: 99 (2020 09:00) (88 - 121)  BP: 111/80 (2020 09:00) (107/67 - 130/95)  BP(mean): 85 (2020 09:00) (77 - 103)  RR: 19 (2020 09:00) (17 - 26)  SpO2: 98% (2020 09:00) (90% - 100%)  Mode: AC/ CMV (Assist Control/ Continuous Mandatory Ventilation), RR (machine): 14, TV (machine): 400, FiO2: 35, PEEP: 5, ITime: 0.88, MAP: 9, PIP: 20   Physical exam:    Constitutional:  No acute distress  HEENT: NC/AT, EOMI, PERRLA, conjunctivae clear  Neck: supple; thyroid not palpable  Back: no tenderness  Respiratory: respiratory effort normal; few rhonchi, decreased BS at bases  Cardiovascular: S1S2 regular, no murmurs  Abdomen: soft, not tender, not distended, positive BS  Genitourinary: no suprapubic tenderness  Lymphatic: no LN palpable  Musculoskeletal: no muscle tenderness, no joint swelling or tenderness; multiple contusions  Extremities: no pedal edema; left 5th toe ulcer with necrosis  Neurological/ Psychiatric: confused; moving all extremities  Skin: multiple skin ulcers and rashes; no drainage; right 5th MTP ulcer    Labs: reviewed                        7.5    9.06  )-----------( 480      ( 2020 12:20 )             24.4     07-23    140  |  107  |  22  ----------------------------<  88  4.1   |  25  |  0.78    Ca    7.8<L>      2020 12:20  Phos  5.4     07-23  Mg     1.3     07-23    TPro  6.3  /  Alb  1.2<L>  /  TBili  0.3  /  DBili  x   /  AST  39<H>  /  ALT  21  /  AlkPhos  120  07-23                        7.4    8.05  )-----------( 100      ( 2020 07:05 )             21.9     06-24    134<L>  |  104  |  48<H>  ----------------------------<  68<L>  4.8   |  20<L>  |  2.31<H>    Ca    7.2<L>      2020 07:05  Phos  3.3     06-24  Mg     1.1     06-24    TPro  4.8<L>  /  Alb  0.8<L>  /  TBili  1.4<H>  /  DBili  x   /  AST  73<H>  /  ALT  27  /  AlkPhos  298<H>  06-24     LIVER FUNCTIONS - ( 2020 07:05 )  Alb: 0.8 g/dL / Pro: 4.8 gm/dL / ALK PHOS: 298 U/L / ALT: 27 U/L / AST: 73 U/L / GGT: x           Urinalysis Basic - ( 2020 05:41 )    Color: Ale / Appearance: Slightly Turbid / S.010 / pH: x  Gluc: x / Ketone: Negative  / Bili: Negative / Urobili: 1 mg/dL   Blood: x / Protein: 30 mg/dL / Nitrite: Negative   Leuk Esterase: Moderate / RBC: 11-25 /HPF / WBC 3-5   Sq Epi: x / Non Sq Epi: Negative / Bacteria: Many      Culture - Sputum (collected 2020 08:29)  Source: .Sputum Sputum trap  Gram Stain (2020 14:45):    No Squamous epithelial cells per low power field    Moderate polymorphonuclear leukocytes per low power field    Moderate Yeast per oil power field  Preliminary Report (2020 10:05):    Normal Respiratory Elsa present    Culture - Urine (collected 2020 05:41)  Source: .Urine None  Final Report (2020 10:38):    >100,000 CFU/ml Methicillin resistant Staphylococcus aureus    <10,000 CFU/ml Normal Urogenital elsa present  Organism: Methicillin resistant Staphylococcus aureus (2020 10:38)  Organism: Methicillin resistant Staphylococcus aureus (2020 10:38)      -  Ampicillin/Sulbactam: R <=8/4      -  Cefazolin: R 8      -  Daptomycin: S 1      -  Gentamicin: S <=1 Should not be used as monotherapy      -  Linezolid: S 2      -  Oxacillin: R >2      -  Penicillin: R >8      -  RIF- Rifampin: S <=1 Should not be used as monotherapy      -  Tetra/Doxy: S <=1      -  Trimethoprim/Sulfamethoxazole: S <=0.5/9.5      -  Vancomycin: S 2      Method Type: KRYSTEN    Culture - Blood (collected 2020 22:22)  Source: .Blood None  Gram Stain (2020 15:07):    Growth in aerobic bottle: Gram Positive Cocci in Clusters    Growth in anaerobic bottle: Gram Positive Cocci in Clusters  Final Report (2020 10:45):    Growth in aerobic and anaerobic bottles: Methicillin resistant    Staphylococcus aureus  Organism: Blood Culture PCR  Methicillin resistant Staphylococcus aureus (2020 10:45)  Organism: Methicillin resistant Staphylococcus aureus (2020 10:45)      -  Ampicillin/Sulbactam: R <=8/4      -  Cefazolin: R 8      -  Clindamycin: R >4      -  Daptomycin: S 1      -  Erythromycin: R >4      -  Gentamicin: S <=1 Should not be used as monotherapy      -  Linezolid: S 2      -  Oxacillin: R >2      -  Penicillin: R >8      -  RIF- Rifampin: S <=1 Should not be used as monotherapy      -  Tetra/Doxy: S <=1      -  Trimethoprim/Sulfamethoxazole: S <=0.5/9.5      -  Vancomycin: S 2      Method Type: KRYSTEN  Organism: Blood Culture PCR (2020 10:45)      -  Methicillin resistant Staphylococcus aureus (MRSA): Detec      Method Type: PCR    Culture - Blood (collected 2020 22:22)  Source: .Blood None  Gram Stain (2020 17:42):    Growth in aerobic bottle: Gram Positive Cocci in Clusters    Growth in anaerobic bottle: Gram Positive Cocci in Clusters  Final Report (2020 10:50):    Growth in aerobic and anaerobic bottles: Methicillin resistant    Staphylococcus aureus    See previous culture [id number]    Culture - Blood in AM (20 @ 14:30)    Gram Stain:   Growth in aerobic bottle: Yeast like cells  N/I - No interpretation available                                                         SDD – Sensitive Dose Dependent    -  5-Flucytosine: N/I For Candida species, no interpretation available for any invitro susceptibility testing.    -  Amphotericin B: N/I 0.25    -  Andulafungin: N/I 0.12    -  Caspofungin: N/I 0.06 CASPOFUNGIN: is indicated in the treatment of candidemia, intra-abdominal abscess, peritonitis, pleural space infections and esophageal candidiasis.    -  Fluconazole: N/I <=0.12 Fluconazole = Data established for mucosal disease, and is generally applicable for invasive disease.  Susceptibility is dependent on achieving the maximal possible blood level.  Doses of 400 MG/day or more may be required in adults with normal renalfunction and body habitus.    -  Itraconazole: N/I For Candida species, no interpretation available for any invitro susceptibility testing.    -  Micafungin: N/I 0.015    -  Posaconazole: N/I 0.015    -  Voriconazole: N/I <=0.008 VORICONAZOLE: The KRYSTEN has been determined by the colormetric microdilution method.    -  Multidrug (KPC pos) resistant organism: Nondet    -  Staphylococcus aureus: Nondet    Specimen Source: .Blood Blood-Peripheral    Organism: Blood Culture PCR    Organism: Brittny dubliniensis    Culture Results:   Growth in aerobic bottle: Brittny dubliniensis    Organism Identification: Blood Culture PCR  Candida dubliniensis    Method Type: PCR    Method Type: YSTMIC    Culture - Sputum . (20 @ 12:35)    Gram Stain:   Numerous polymorphonuclear leukocytes per low power field  Few Squamous epithelial cells per low power field  Numerous Gram Negative Rods per oil power field    Specimen Source: .Sputum Sputum    Radiology: all available radiological tests reviewed    Cardiac Echo:  The left ventricle is normal in size, wall thickness, wall motion and   contractility.   Estimated left ventricular ejection fraction is 60 %.   The right atrium is normal in size.   A catheter is noted in the right atrium.   The aortic valve is trileaflet with thin pliable leaflets.   The mitral valve leaflets appear thickened.   Trace mitral regurgitation is present.   The tricuspid valve leaflets appear mildly thickened and/or calcified, but   open well.   Trace tricuspid valve regurgitation is present.   Trace pulmonic valvular regurgitation is present.    < end of copied text >    < from: CT Abdomen and Pelvis w/ Oral Cont (20 @ 11:42) >  1.  Improved RIGHT lower lobe and RIGHT middle lobe pneumonia. Residual nodular opacities likely represent residual infection. Slightly increased RIGHT pleural effusion since the most recent prior study. Marked increase in LEFT pleural effusion since the prior study with new complete atelectasis/consolidation of the LEFT lower lobe.  2.  Increasing pericardial effusion.  3.  No evidence of bowel obstruction. PEG tube in place.  4.  Thickening versus incomplete distention of the cecum with nonvisualization of the appendix.  5.  Ascites with questionable thickening of the peritoneum. Limited evaluation due to lack of IV contrast material. Correlate with signs/symptoms of peritonitis.  6.  LEFT para-aortic increased soft tissue likely represents adenopathy which is stable from most recent exam but increased from study dated 2012.    < end of copied text >      Advanced directives addressed: full resuscitation

## 2020-07-24 NOTE — PROGRESS NOTE ADULT - ASSESSMENT
40 y/o female with h/o SLE, RA, Reynauld syndrome, epilepsy (last seizure 8 yrs ago), IVDA (heroin) was admitted on  for increased weakness. Pt states that her grandmother  approx. 3 months ago and for the past almost 2 months she has been snorting two bags of heroin a day. Pt states her heroin use is partially due to depression and anxiety since gma passed away however also due to financial reasons as can no longer afford oxycodone which she had been taking for her lupus/RA chronic pain as Rx. Pt states she has wounds all over her body from falls, ulcers from previous attempted injection sites, which are not healing and bleed at times. Pt describes weakness as fatigue, endorses sob with exertion, weight loss due to decreased appetite, palpitations. Denies fever, cough. In ER she was found with low grade fever and received vancomycin IV and zosyn.     1. Febrile syndrome. Possible aspiration pneumonia with GNR. Right foot ulcer with necrotic 5th toe with probable underlying infection/OM. s/p sepsis with MRSA. Multiple skin ulcers. Soft tissue portal vein/ liver mass ?cause. Chronic renal failure. Chronic respiratory failure due to deconditioning.  -new fever  -leukocytosis improving  -BC reviewed  -repeat BC could not be obtained so far; to try again  -s/p vancomycin # 14  and fluconazole # 14 days  -on doxycycline 100 mg PO q12h # 12  -tolerating abx well so far; no side effects noted  -start cefepime 2 gm IV q12h   -continue antimicrobial coverage  -monitor temps  -f/u CBC and cultures  -supportive care  2. Other issues:   -care per medicine

## 2020-07-24 NOTE — PROGRESS NOTE ADULT - SKIN COMMENTS
Diffuse open ulcers and healed track marks
Diffuse healed track marks and open ulcers
Diffuse healed track marks and opened ulcers
Diffuse healed track marks and open ulcers
large sacral decub, clean with slight exudate
multiple abrasions, wounds track marks
multiple wounds and abrasion
Diffuse healed track marks and open ulcers
Diffuse old healed and fresh ulcers and track marks
Diffuse unhealing ulcers and open wound

## 2020-07-25 LAB
CULTURE RESULTS: SIGNIFICANT CHANGE UP
SPECIMEN SOURCE: SIGNIFICANT CHANGE UP

## 2020-07-25 PROCEDURE — 99232 SBSQ HOSP IP/OBS MODERATE 35: CPT

## 2020-07-25 PROCEDURE — 99233 SBSQ HOSP IP/OBS HIGH 50: CPT

## 2020-07-25 RX ORDER — OXYCODONE HYDROCHLORIDE 5 MG/1
5 TABLET ORAL EVERY 6 HOURS
Refills: 0 | Status: DISCONTINUED | OUTPATIENT
Start: 2020-07-25 | End: 2020-07-30

## 2020-07-25 RX ADMIN — FAMOTIDINE 20 MILLIGRAM(S): 10 INJECTION INTRAVENOUS at 09:31

## 2020-07-25 RX ADMIN — Medication 50 MILLIGRAM(S): at 06:30

## 2020-07-25 RX ADMIN — Medication 1 APPLICATION(S): at 09:18

## 2020-07-25 RX ADMIN — Medication 1 MILLIGRAM(S): at 21:26

## 2020-07-25 RX ADMIN — Medication 1 APPLICATION(S): at 09:31

## 2020-07-25 RX ADMIN — CHLORHEXIDINE GLUCONATE 15 MILLILITER(S): 213 SOLUTION TOPICAL at 21:27

## 2020-07-25 RX ADMIN — Medication 1 MILLIGRAM(S): at 09:17

## 2020-07-25 RX ADMIN — Medication 0.1 MILLIGRAM(S): at 15:41

## 2020-07-25 RX ADMIN — Medication 500 MILLIGRAM(S): at 09:17

## 2020-07-25 RX ADMIN — CHLORHEXIDINE GLUCONATE 15 MILLILITER(S): 213 SOLUTION TOPICAL at 10:12

## 2020-07-25 RX ADMIN — OXYCODONE HYDROCHLORIDE 5 MILLIGRAM(S): 5 TABLET ORAL at 00:01

## 2020-07-25 RX ADMIN — Medication 100 MILLIGRAM(S): at 21:27

## 2020-07-25 RX ADMIN — OXYCODONE HYDROCHLORIDE 5 MILLIGRAM(S): 5 TABLET ORAL at 07:10

## 2020-07-25 RX ADMIN — Medication 100 MILLIGRAM(S): at 09:17

## 2020-07-25 RX ADMIN — Medication 1 APPLICATION(S): at 10:10

## 2020-07-25 RX ADMIN — FAMOTIDINE 20 MILLIGRAM(S): 10 INJECTION INTRAVENOUS at 21:26

## 2020-07-25 RX ADMIN — OXYCODONE HYDROCHLORIDE 5 MILLIGRAM(S): 5 TABLET ORAL at 13:06

## 2020-07-25 RX ADMIN — OXYCODONE HYDROCHLORIDE 5 MILLIGRAM(S): 5 TABLET ORAL at 01:00

## 2020-07-25 RX ADMIN — CEFEPIME 100 MILLIGRAM(S): 1 INJECTION, POWDER, FOR SOLUTION INTRAMUSCULAR; INTRAVENOUS at 05:30

## 2020-07-25 RX ADMIN — Medication 1 TABLET(S): at 09:17

## 2020-07-25 RX ADMIN — Medication 5 MILLIGRAM(S): at 21:26

## 2020-07-25 RX ADMIN — Medication 0.1 MILLIGRAM(S): at 05:30

## 2020-07-25 RX ADMIN — Medication 100 MILLIGRAM(S): at 09:31

## 2020-07-25 RX ADMIN — ENOXAPARIN SODIUM 40 MILLIGRAM(S): 100 INJECTION SUBCUTANEOUS at 09:17

## 2020-07-25 RX ADMIN — CEFEPIME 100 MILLIGRAM(S): 1 INJECTION, POWDER, FOR SOLUTION INTRAMUSCULAR; INTRAVENOUS at 19:49

## 2020-07-25 RX ADMIN — Medication 0.1 MILLIGRAM(S): at 21:26

## 2020-07-25 NOTE — BEHAVIORAL HEALTH ASSESSMENT NOTE - SUMMARY
38 yo, single,  female; with a past medical history of lupus, RA, reynards, epilepsy (last seizure 8 yrs ago), with a substance use history of IVDA (clean for 4 years), who presented to ED due to weakness per record. Patient was verbal and able to communicate on presentation; however, she ultimately required intubation and is currently tracked. She was admitted for cellulitis and sepsis. Psychiatry consulted for depression.

## 2020-07-25 NOTE — PROGRESS NOTE ADULT - SUBJECTIVE AND OBJECTIVE BOX
Date of service: 7/25/2020  Chief Complaint: B/L lower extremity wounds     HPI: Pt was examined at bedside by podiatry with attending present, for B/L lower extremity ulcerative lesions. Pt is a 40 yo female who has been admitted to the CCU for septic shock, UTI with MRSA and bacteremia. Noted Pt is currently intubated and non verbal, she is alert and oriented. The patient is also being treated for anemia, acute respiratory failure and toxic metabolic encephalopathy. Of Note, Pt has a history of IVDA drug use and is critically ill and at risk for acute decompensation possible death per the intensivist. Due to the patient being intubated all additional medical history and information was gathered from the patients chart.    7/25: Patient seen and examined by podiatry for follow-up evaluation of right and left lower extremity wounds in the ICU. Patient noted be arousable at bedside and patient s/p tracheostomy procedure performed on 7/13/2020 and PEG placed on 7/16/2020. Patient noted to be responsive to external stimuli when her lower extremities are evaluated by Podiatry. Patient afebrile overnight and no overnight events noted in patient's chart. All further HPI obtained via patient's chart.    REVIEW OF SYSTEMS:  Unable to assess due to pt's inability to communicate per intubation    Allergies:  morphine (Unknown)    Past Medical History:  Lupus, RA, Reynard's Epilepsy, IVDA     Family History:  No pertinent family history in first degree relatives: cannot recall family history at this time    Social History:  lives alone  single  smokes 4 cig/day  uses two bags of heroin a day (24 Jun 2020 01:21)    ICU Vital Signs Last 24 Hrs  T(C): 37.4 (25 Jul 2020 05:00), Max: 37.9 (24 Jul 2020 20:00)  T(F): 99.3 (25 Jul 2020 05:00), Max: 100.3 (24 Jul 2020 20:00)  HR: 89 (25 Jul 2020 07:00) (85 - 115)  BP: 111/79 (25 Jul 2020 07:00) (96/63 - 128/87)  BP(mean): 86 (25 Jul 2020 07:00) (69 - 96)  ABP: --  ABP(mean): --  RR: 17 (25 Jul 2020 07:00) (10 - 30)  SpO2: 97% (25 Jul 2020 07:00) (90% - 100%)    Physical examination:  Constitutional: Pt intubated, lying in bed    Focused LE examination:   Vasc: DP and PT pulses non palpable b/l, CFT < 5 x 10, TG: warm to warm  Neuro and MSK: Unable to assess due to patient's condition    Derm:  Noted Multiple hyperpigmented scar-like lesions scattered all over LE, bl    Rt LE  - Full thickness ulcer with exposed peroneal tendons on lateral aspect of distal RLE that + probes to Fibula, no drainage, + undermining all around the clock, + tenderness. Negative crepitus or fluctuance to area of lateral aspect of the distal RLE.  - Rt 5th toe demonstrates a full thickness with less fibronecotic wound bed and evidence of increasing granulation tissue compared to the prior examination, measuring approximately 7.2 x 4.6 x 0.8 cm, - purulence, - malodor, - fluctuance +Probe to 5th metatarsal head, EDL tendon exposed and + undermining from the medial aspect, + tunneling to the 9 hour.     Lt LE:  -Full thickness ulcer round in shape and approx  0.5 cm distal to lateral malleolus, tunneling to 3 and 6 o'clock positions, + undermining all around, - drainage, - fluctuance - malodor, + probe to bone to the lateral malleolus, - purulence; negative crepitus and - fluctuance  - Multiple Full thickness ulcerations with exposed Achilles tendon noted to the posterior surface of the left distal 1/3 aspect of the left leg.  -Swelling and mild edema noted over the medial malleolus of LLE. Intact serous blister noted to the medial malleolar region. Edema noted to the dorsal-lateral aspect of the left fifth MPJ, noted to be mostly likely pressure induced; area of black-blue discoloration noted to the the left fifth sub met 5 region.   -Partial thickness ulcer, 4th interdigital space with presence of dry blood, - probe to bone, - undermining - tunneling  - Non gradable eschar on 3rd digit on Lt foot  - Full thickness ulceration noted to the dorsum of the left 1st MPJ. Positive probing to the left 1st MPJ. Positive purulence exuded from the wound with milking of the left foot.  - Full thickness ulceration noted to the lateral aspect of the upper 1/3 aspect of the leg at the level of the fibular notch. + tendon exposed; no drainage with palpation of the periwound area; no malodor exuded from the wound, negative fluctuance and negative crepitus to the area. Full thickness ulceration L fibular notch area noted to be covered with Alleyvn pad.   - Full thickness ulceration noted to plantar aspect of left foot sub metatarsal 5 region. + Serous drainage noted with milking of the left foot. - fluctuance or crepitus felt to the sub met 5 left foot. No malodor exuded from drainage to the left foot. - probe to bone, - undermining and - tunneling.       Labs:                        7.5    9.06  )-----------( 480      ( 23 Jul 2020 12:20 )             24.4   07-23    140  |  107  |  22  ----------------------------<  88  4.1   |  25  |  0.78    Ca    7.8<L>      23 Jul 2020 12:20  Phos  5.4     07-23  Mg     1.3     07-23    TPro  6.3  /  Alb  1.2<L>  /  TBili  0.3  /  DBili  x   /  AST  39<H>  /  ALT  21  /  AlkPhos  120  07-23           Culture - Blood in AM (07.06.20 @ 14:30)    Gram Stain:   Growth in aerobic bottle: Yeast like cells    -  5-Flucytosine: N/I For Candida species, no interpretation available for any invitro susceptibility testing.    -  Amphotericin B: N/I 0.25    -  Andulafungin: N/I 0.12    -  Caspofungin: N/I 0.06 CASPOFUNGIN: is indicated in the treatment of candidemia, intra-abdominal abscess, peritonitis, pleural space infections and esophageal candidiasis.    -  Fluconazole: N/I <=0.12 Fluconazole = Data established for mucosal disease, and is generally applicable for invasive disease.  Susceptibility is dependent on achieving the maximal possible blood level.  Doses of 400 MG/day or more may be required in adults with normal renalfunction and body habitus.    -  Itraconazole: N/I For Candida species, no interpretation available for any invitro susceptibility testing.    -  Micafungin: N/I 0.015    -  Posaconazole: N/I 0.015    -  Voriconazole: N/I <=0.008 VORICONAZOLE: The KRYSTEN has been determined by the colormetric microdilution method.    -  Multidrug (KPC pos) resistant organism: Nondet    -  Staphylococcus aureus: Nondet    -  Methicillin resistant Staphylococcus aureus (MRSA): Nondet    -  Coagulase negative Staphylococcus: Nondet    -  Enterococcus species: Nondet    -  Vancomycin resistant Enterococcus sp.: Nondet    -  Escherichia coli: Nondet    -  Klebsiella oxytoca: Nondet    -  Klebsiella pneumoniae: Nondet    -  Serratia marcescens: Nondet    -  Haemophilus influenzae: Nondet    -  Listeria monocytogenes: Nondet    -  Neisseria meningitidis: Nondet    -  Pseudomonas aeruginosa: Nondet    -  Acinetobacter baumanii: Nondet    -  Enterobacter cloacae complex: Nondet    -  Streptococcus sp. (Not Grp A, B or S pneumoniae): Nondet    -  Streptococcus agalactiae (Group B): Nondet    -  Streptococcus pyogenes (Group A): Nondet    -  Streptococcus pneumoniae: Nondet    -  Candida albicans: Nondet    -  Candida glabrata: Nondet    -  Candida krusei: Nondet    -  Candida parapsilosis: Nondet    -  Candida tropicalis: Nondet    -  Interpretation: See note This test method is not approved by the FDA and is for research use only.  The performance characteristics of this assay may have been determined by [employer] and [facility], [place], [place]                            N/I - No interpretation available                                                         SDD – Sensitive Dose Dependent    Specimen Source: .Blood Blood-Peripheral    Organism: Blood Culture PCR    Organism: Brittny dubliniensis    Culture Results:   Growth in aerobic bottle: Brittny dubliniensis  "Due to technical problems, Proteus sp. will Not be reported as part of  the BCID panel until further notice"  ***Blood Panel PCR results on this specimen are available  approximately 3 hours after the Gram stain result.***  Gram stain, PCR, and/or culture results may not always  correspond due to difference in methodologies.  ************************************************************  This PCR assay was performed using [employer].  The following targets are tested for: Enterococcus,  vancomycin resistant enterococci, Listeria monocytogenes,  coagulase negative staphylococci, S. aureus,  methicillin resistant S. aureus, Streptococcus agalactiae  (Group B), S. pneumoniae, S. pyogenes (Group A),  Acinetobacter baumannii, Enterobacter cloacae, E. coli,  Klebsiella oxytoca, K. pneumoniae, Proteus sp.,  Serratia marcescens, Haemophilus influenzae,  Neisseria meningitidis, Pseudomonas aeruginosa, Candida  albicans, C. glabrata, C krusei, C parapsilosis,  C. tropicalis and the KPC resistance gene.    Organism Identification: Blood Culture PCR  Candida dubliniensis    Method Type: PCR    Method Type: YSTMIC      Imaging:    < from: US Duplex Arterial Lower Ext Compl, Bilateral (07.14.20 @ 14:49) >  IMPRESSION:  No evidence of a hemodynamically significant stenosis or occlusion in the visualized lower extremity arteries.  Subcutaneous edema throughout both lower extremities with fluid collections in both groins.  Monophasic waveforms throughout both lower extremities.  < end of copied text >      < from: TTE Echo Complete w/o Contrast w/ Doppler (06.27.20 @ 08:58) >   Impression   Summary   The mid to apical anterolateral to anterior segments are severely   hypokinetic. The apical inferoseptal wall appears hypokinetic.   Estimated left ventricular ejection fraction is 45-50 %.   A catheter wire is seen in the right atrium.   Mild (1+) tricuspid valve regurgitation is present.   Mild pulmonary hypertension.  < end of copied text >      < from: Xray Ankle Complete 3 Views, Bilateral (07.09.20 @ 16:42) >  EXAM:  XR ANKLE COMP MIN 3 VIEWS BI                        *** ADDENDUM 07/10/2020  ***                                                           Addendum:    The posterior soft tissue gas of the left ankle may be related to skin ulcerationor early fistulous tract formation. Clinical correlation is suggested.  *** END OF ADDENDUM 07/10/2020  ***  PROCEDURE DATE:  07/09/2020    INTERPRETATION:  Bilateral ankles  HISTORY: Bilateral ulcers    Three views of the right ankle and three views of the left ankle show no evidence of fracture nor destructive change. The joint spaces are maintained.  Subcutaneous soft tissue gas is present behind the left ankle.  IMPRESSION: Soft tissue gas as noted.  Thank you for this referral  ***Please see the addendum at the top of this report. It may contain additional important information or changes.****  < end of copied text >

## 2020-07-25 NOTE — BEHAVIORAL HEALTH ASSESSMENT NOTE - HPI (INCLUDE ILLNESS QUALITY, SEVERITY, DURATION, TIMING, CONTEXT, MODIFYING FACTORS, ASSOCIATED SIGNS AND SYMPTOMS)
40 yo, single,  female; with a past medical history of lupus, RA, reynards, epilepsy (last seizure 8 yrs ago), with a substance use history of IVDA (clean for 4 years), who presented to ED due to weakness per record. Patient was verbal and able to communicate on presentation; however, she ultimately required intubation and is currently tracked. She was admitted for cellulitis and sepsis. Psychiatry consulted for depression.    Patient unable to participate in assessment at this time; she is currently tracked and appears anhedonic. Per record, patient became tachypneic upon medical team attempt at weaning her off the ventilator. Patient is not interviewable at this time.    COLLATERAL FROM CHART: "Patient states that her grandmother  approx. 3 months ago and for the past almost 2 months she has been snorting two bags of heroin a day. Pt states her heroin use is partially due to depression and anxiety since gma passed away however also due to financial reasons as can no longer afford oxycodone which she had been taking for her lupus/RA chronic pain as Rx. Pt states she has wounds all over her body from falls, ulcers from previous attempted injection sites, which are not healing and bleed (approx 2 tbs (RUE wound)) daily. Pt describes weakness as fatigue, endorses sob with exertion, weight loss due to decreased appetite from depression and drug use, palpitations. Denies n/v/d, constipation, HA, hematemesis, hematochezia, dark stools, abd pain, cp, fevers, cough, leg swelling. In ED pt found to have lactate 3.9 with Hgb of 5.5, , T 100.4. (2020 01:21)"

## 2020-07-25 NOTE — PHYSICAL THERAPY INITIAL EVALUATION ADULT - ACTIVE RANGE OF MOTION EXAMINATION, REHAB EVAL
ltd AROM t/o-min DF, min KE (+QS), knee flex ~20 deg, ltd hand (L>R), L wrist ext neutral, flex WFL, very ltd R wrist, R elbow 40-90, L elbow , no active shld

## 2020-07-25 NOTE — PROGRESS NOTE ADULT - SUBJECTIVE AND OBJECTIVE BOX
Subjective:  Pt seen, on vent, no new complaints    Vital Signs:  Vital Signs Last 24 Hrs  T(C): 37.4 (07-25-20 @ 05:00), Max: 37.9 (07-24-20 @ 20:00)  T(F): 99.3 (07-25-20 @ 05:00), Max: 100.3 (07-24-20 @ 20:00)  HR: 91 (07-25-20 @ 09:00) (85 - 115)  BP: 111/81 (07-25-20 @ 09:00) (96/63 - 128/87)  RR: 20 (07-25-20 @ 09:00) (10 - 30)  SpO2: 97% (07-25-20 @ 09:00) (94% - 100%) on (O2)    Telemetry/Alarms:    Relevant labs, radiology and Medications reviewed                        7.5    9.06  )-----------( 480      ( 23 Jul 2020 12:20 )             24.4     07-23    140  |  107  |  22  ----------------------------<  88  4.1   |  25  |  0.78    Ca    7.8<L>      23 Jul 2020 12:20  Phos  5.4     07-23  Mg     1.3     07-23    TPro  6.3  /  Alb  1.2<L>  /  TBili  0.3  /  DBili  x   /  AST  39<H>  /  ALT  21  /  AlkPhos  120  07-23      MEDICATIONS  (STANDING):  ascorbic acid 500 milliGRAM(s) Oral daily  cefepime   IVPB 2000 milliGRAM(s) IV Intermittent every 12 hours  cefepime   IVPB      chlorhexidine 0.12% Liquid 15 milliLiter(s) Oral Mucosa every 12 hours  clonazePAM  Tablet 1 milliGRAM(s) Oral two times a day  cloNIDine 0.1 milliGRAM(s) Oral every 8 hours  collagenase Ointment 1 Application(s) Topical daily  Dakins Solution - 1/4 Strength 1 Application(s) Topical daily  doxycycline hyclate Capsule 100 milliGRAM(s) Oral every 12 hours  enoxaparin Injectable 40 milliGRAM(s) SubCutaneous daily  famotidine    Tablet 20 milliGRAM(s) Oral two times a day  folic acid 1 milliGRAM(s) Oral daily  melatonin 5 milliGRAM(s) Oral at bedtime  multivitamin 1 Tablet(s) Oral daily  silver sulfADIAZINE 1% Cream 1 Application(s) Topical daily  thiamine 100 milliGRAM(s) Oral daily    MEDICATIONS  (PRN):  acetaminophen   Tablet .. 650 milliGRAM(s) Oral every 4 hours PRN Temp greater or equal to 38C (100.4F), Mild Pain (1 - 3)  glucagon  Injectable 1 milliGRAM(s) IntraMuscular once PRN Glucose <70 milliGRAM(s)/deciLiter  hydrOXYzine hydrochloride 50 milliGRAM(s) Oral every 4 hours PRN Anxiety  loperamide 2 milliGRAM(s) Oral every 4 hours PRN Loose stool  oxyCODONE    IR 5 milliGRAM(s) Oral every 6 hours PRN Moderate Pain (4 - 6)      Physical exam  Gen NAD  Neuro Alert  neck- trach midline  Card RRR  Pulm clear  Abd soft, PEG in place  Ext warm,  dressings intact to b/l LE        I&O's Summary    24 Jul 2020 07:01  -  25 Jul 2020 07:00  --------------------------------------------------------  IN: 1761 mL / OUT: 950 mL / NET: 811 mL        Assessment  39y Female  w/ PAST MEDICAL & SURGICAL HISTORY:  Smoker  Heroin abuse  H/O Raynaud's syndrome  Rheumatoid arthritis  Lupus  Gall bladder stones  H/O appendicitis  H/O tubal ligation  admitted with complaints of Patient is a 39y old  Female who presents with a chief complaint of Cellulitis with sepsis, symptomatic anemia. (25 Jul 2020 07:33)  .  39y Female h/o lupus, RA, epilepsy (last seizure 8 yrs ago), chronic pain, IVDA admitted w weakness, failure to thrive. Found to have acute anemia, NATALIA, transaminitis, metabolic lactic acidosis, severe dehydration, severe protein calorie malnutrition. And severe sepsis with septic shock secondary to MRSA bacteremia and UTI treated with a two-week course of Vancomycin, developed worsening respiratory failure and was ultimately intubated on 6/27/20.  s/p tracheostomy 7/13 currently being treated for fungemia. POD 7 PEG. 7/22 w respiratory distress, now on vent.    PEG care  loculated small left effusion, partially seen on CT abd/pelvis. CXR done- small retrocardiac effusion/opacity. Pt on 40% FIO2.  Will watch for now, no acute intervention.    Discussed with Cardiothoracic Team at AM rounds.

## 2020-07-25 NOTE — PHYSICAL THERAPY INITIAL EVALUATION ADULT - PASSIVE RANGE OF MOTION EXAMINATION, REHAB EVAL
R shld flex ~70, L ~40, lacks B elbow full ext, R elbow flex ~100, L WFL, L DF 0, R -10, ltd B knee flex (pain)

## 2020-07-25 NOTE — PROGRESS NOTE ADULT - ASSESSMENT
Assessment: 38 y/o female seen by podiatry at bedside in the ICU for the followin.) Full Thickness Fibronecrotic Ulcer of Right fifth digit  2.) Full Thickness Fibronecrotic Ulcer with exposed peroneal tendon, lateral aspect RLE  3.) Full thickness ulcer with exposed Achilles tendon LT  4.) Full thickness ulcer to lateral malleolus, LLE  5) Full thickness ulceration, dorsum of left 1st MTPJ  6) Partial Thickness ulcer 4th interspace, Lt foot  7) Full thickness ulceration, upper 1/3 aspect of left leg at level of fibular notch  8) Non gradable Eschar of 3rd digit, Lt foot  9) Multiple hyperpigmented scar-like lesions scattered all over LE, bl  10) Serous blister, medial malleolus L side  11) Full thickness ulceration, left foot plantar sub metatarsal 5 region  12) Pain in B/L Lower Extremities     Plan:  - Chart reviewed and patient evaluated by Podiatry team.   - X-rays of the b/l LEs reviewed at this time. Case discussed with radiologist in depth. Please note oval radiolucencies visualized in the b/l LEs X-rays are due to air in exposed open wounds.   - Final wound Cx. left 1st MTPJ full-thickness ulceration, reviewed  - All wounds to b/l LEs irrigated with copious amounts of sterile saline.  - SSD and adaptic applied over the right dorsolateral forefoot ulceration.   - Alleyvn pad and SSD applied over exposed peroneal tendons to RLE.   - Adaptic applied over exposed Achilles tendon of LLE.   - Silvadene applied to wounds of L 3rd digit and L lateral malleolus ulceration.  - Silvadene applied to full-thickness ulceration left foot submet 5 region.   - The b/l feet and ankles were wrapped with 4x4 gauze, ABD pad and kerlix, then secured with tape.   - Patient's right and left heels to be offloaded in b/l Z-flex boots to be worn at all times when patient bedbound.  - Vascular Consult, appreciated.   - Discussed case with ID department given purulence exuded from full thickness ulceration to left 1st MPJ and that deep wound cx was taken, Abx per ID appreciated. Antibiotic regimen per ID appreciated.  - Discussed with CCU doctor the case, and recommendations, appreciated. Discussed case with Medicine department as well now that patient on Med/Surg inpatient unit.  - Discussed findings of full-thickness ulceration at level of left fibular notch with Vascular surgery and Wound care nursing departments. Local wound care of full-thickness ulceration at level of left fibular notch per Wound care nursing department in addition to care of patient's sacral ulceration, appreciated.  - No podiatric surgical intervention necessary at this time as no abscess collections found to b/l lower extremities   - At this time, podiatry will provide supportive, and noninvasive wound care to the wounds, b/l lower extremities, given the fact that the patient is deemed in non stable medically and at risk for acute decompensation.  - Per  patient planned for D/C to rehab facility tomorrow 2020. When patient is deemed stable for discharge by all other medical specialties, patient instructed to f/u at the [facility] for continued podiatric care. Daily local wound care instructions outlined below.   - Nutrition supplementation per Dietician given albumin level decreased, appreciated.   - Podiatry will follow the case closely while in house.     DAILY LOCAL WOUND CARE INSTRUCTIONS  Please irrigate all wounds to b/l LEs copious amounts of sterile saline.  - Apply Silvadene and adaptic over the right dorsolateral forefoot ulceration.   - Apply Alleyvn pad and SSD over exposed peroneal tendons to right leg.   - Apply Adaptic over exposed Achilles tendon of Left lower extremity.   - Apply Silvadene to wounds of Left 3rd digit and Left lateral malleolus ulceration.  - Apply Silvadene to the full-thickness ulceration to the bottom of the left foot underneath the left fifth toe.  - Lastly, wrap the bilateral lower extremities with 4x4 gauze, ABD pads and kerlix and then secure with tape.

## 2020-07-25 NOTE — PHYSICAL THERAPY INITIAL EVALUATION ADULT - MANUAL MUSCLE TESTING RESULTS, REHAB EVAL
BLE 1+ - 2-/5, BUE 1 (shlds, R wrist) - 3- (L elbow)
B UE 2/5 throughout: B LE 1-2/5 throughout. B UE digits and toes swollen with decreased ROM with flex and ext. Unable to assess mobility at this time due to fever and increased pain from wound care session. Will f/u next session.

## 2020-07-25 NOTE — PROGRESS NOTE ADULT - SUBJECTIVE AND OBJECTIVE BOX
Events Overnight:  Patient remains on vent, no fevers, becomes tachypneic with weaning    HPI:      40 yo F admitted  presented with MRSA bacteremia, NATALIA, transaminitis, metabolic lactic acidosis,   respiratory failure requiring intubation now s/p trach (), septic shock 2/2 MRSA bacteremia and         Fungemia, treated. The patient was on trach collar and was transferred to floor awaiting placement       on  pm redeveloped acute respiratory failure, placed back on vent with fever.    ROS - difficult to obtain as patient is intubated,  less abdominal discomfort, anxious with weaning     MEDICATIONS  (STANDING):  ascorbic acid 500 milliGRAM(s) Oral daily  cefepime   IVPB 2000 milliGRAM(s) IV Intermittent every 12 hours  cefepime   IVPB      chlorhexidine 0.12% Liquid 15 milliLiter(s) Oral Mucosa every 12 hours  clonazePAM  Tablet 1 milliGRAM(s) Oral two times a day  cloNIDine 0.1 milliGRAM(s) Oral every 8 hours  collagenase Ointment 1 Application(s) Topical daily  Dakins Solution - 1/4 Strength 1 Application(s) Topical daily  doxycycline hyclate Capsule 100 milliGRAM(s) Oral every 12 hours  enoxaparin Injectable 40 milliGRAM(s) SubCutaneous daily  famotidine    Tablet 20 milliGRAM(s) Oral two times a day  folic acid 1 milliGRAM(s) Oral daily  melatonin 5 milliGRAM(s) Oral at bedtime  multivitamin 1 Tablet(s) Oral daily  silver sulfADIAZINE 1% Cream 1 Application(s) Topical daily  thiamine 100 milliGRAM(s) Oral daily    MEDICATIONS  (PRN):  acetaminophen   Tablet .. 650 milliGRAM(s) Oral every 4 hours PRN Temp greater or equal to 38C (100.4F), Mild Pain (1 - 3)  glucagon  Injectable 1 milliGRAM(s) IntraMuscular once PRN Glucose <70 milliGRAM(s)/deciLiter  hydrOXYzine hydrochloride 50 milliGRAM(s) Oral every 4 hours PRN Anxiety  loperamide 2 milliGRAM(s) Oral every 4 hours PRN Loose stool  oxyCODONE    IR 5 milliGRAM(s) Oral every 6 hours PRN Moderate Pain (4 - 6)      ICU Vital Signs Last 24 Hrs  T(C): 37.8 (2020 10:00), Max: 37.9 (2020 20:00)  T(F): 100.1 (2020 10:00), Max: 100.3 (2020 20:00)  HR: 97 (2020 11:00) (85 - 115)  BP: 106/81 (2020 11:00) (96/63 - 128/87)  BP(mean): 86 (2020 11:00) (69 - 96)  ABP: --  ABP(mean): --  RR: 19 (2020 11:00) (10 - 30)  SpO2: 98% (2020 10:00) (94% - 100%)      Mode: AC/ CMV (Assist Control/ Continuous Mandatory Ventilation)  RR (machine): 14  TV (machine): 400  FiO2: 40  PEEP: 5  ITime: 1  MAP: 10  PIP: 22    I&O's Summary    2020 07:01  -  2020 07:00  --------------------------------------------------------  IN: 1761 mL / OUT: 950 mL / NET: 811 mL                            7.5    9.06  )-----------( 480      ( 2020 12:20 )             24.4       07-23    140  |  107  |  22  ----------------------------<  88  4.1   |  25  |  0.78    Ca    7.8<L>      2020 12:20  Phos  5.4     07-23  Mg     1.3     07-23    TPro  6.3  /  Alb  1.2<L>  /  TBili  0.3  /  DBili  x   /  AST  39<H>  /  ALT  21  /  AlkPhos  120  07-23    Urinalysis Basic - ( 2020 15:55 )    Color: Yellow / Appearance: Slightly Turbid / S.015 / pH: x  Gluc: x / Ketone: Trace  / Bili: Negative / Urobili: Negative mg/dL   Blood: x / Protein: 30 mg/dL / Nitrite: Negative   Leuk Esterase: Trace / RBC: 6-10 /HPF / WBC 0-2   Sq Epi: x / Non Sq Epi: Occasional / Bacteria: Occasional      DVT Prophylaxis:   Lovenox                                                              Advanced Directives: Full Code

## 2020-07-25 NOTE — PHYSICAL THERAPY INITIAL EVALUATION ADULT - IMPAIRMENTS FOUND, PT EVAL
gait, locomotion, and balance/aerobic capacity/endurance/muscle strength/ROM
gait, locomotion, and balance

## 2020-07-25 NOTE — PROGRESS NOTE ADULT - EXTREMITIES COMMENTS
extensive scarring from injection sites
right foot with open wound down to tendon
multiple track marks and abrasions, anasarcic
multiple wounds arms and leg, right foot with open wound with exudate and eschar
right foot clean with exposed tendon, left toe ulcer, small open wound left lateral leg
mottling and scarring/track marks/cellulitis of all 4 extremities  (+) edema of UE/LE/Hands

## 2020-07-25 NOTE — PHYSICAL THERAPY INITIAL EVALUATION ADULT - PERTINENT HX OF CURRENT PROBLEM, REHAB EVAL
see IE. admitted 6/24 presented with MRSA bacteremia, NATALIA, transaminitis, metabolic lactic acidosis, respiratory failure requiring intubation, s/p trach (7/13), septic shock 2/2 MRSA bacteremia and fungemia-treated.  patient was on trach collar and was transferred to floor awaiting placement. on 7/22 pm redeveloped acute respiratory failure, placed back on vent with fever, tx back to ICU. weaning trial in progress.

## 2020-07-25 NOTE — PROGRESS NOTE ADULT - ASSESSMENT
Patient s/p MRSA bacteremia  s/p Fungemia  restarted on cefepime, for left lower lobe infiltrate on CT of chest., sputum culture negative so far  with respiratory failure not tolerating weaning, will give weaning trial  blood cultures negative, fevers better  oob to chair  Lovenox - dvt prophylaxis

## 2020-07-26 LAB
ANION GAP SERPL CALC-SCNC: 9 MMOL/L — SIGNIFICANT CHANGE UP (ref 5–17)
BUN SERPL-MCNC: 34 MG/DL — HIGH (ref 7–23)
CALCIUM SERPL-MCNC: 7.9 MG/DL — LOW (ref 8.5–10.1)
CHLORIDE SERPL-SCNC: 106 MMOL/L — SIGNIFICANT CHANGE UP (ref 96–108)
CO2 SERPL-SCNC: 23 MMOL/L — SIGNIFICANT CHANGE UP (ref 22–31)
CREAT SERPL-MCNC: 0.79 MG/DL — SIGNIFICANT CHANGE UP (ref 0.5–1.3)
GLUCOSE SERPL-MCNC: 99 MG/DL — SIGNIFICANT CHANGE UP (ref 70–99)
POTASSIUM SERPL-MCNC: 4.4 MMOL/L — SIGNIFICANT CHANGE UP (ref 3.5–5.3)
POTASSIUM SERPL-SCNC: 4.4 MMOL/L — SIGNIFICANT CHANGE UP (ref 3.5–5.3)
SODIUM SERPL-SCNC: 138 MMOL/L — SIGNIFICANT CHANGE UP (ref 135–145)

## 2020-07-26 PROCEDURE — 99233 SBSQ HOSP IP/OBS HIGH 50: CPT

## 2020-07-26 PROCEDURE — 71045 X-RAY EXAM CHEST 1 VIEW: CPT | Mod: 26

## 2020-07-26 RX ADMIN — Medication 100 MILLIGRAM(S): at 21:57

## 2020-07-26 RX ADMIN — Medication 1 MILLIGRAM(S): at 09:17

## 2020-07-26 RX ADMIN — Medication 50 MILLIGRAM(S): at 03:07

## 2020-07-26 RX ADMIN — CHLORHEXIDINE GLUCONATE 15 MILLILITER(S): 213 SOLUTION TOPICAL at 21:57

## 2020-07-26 RX ADMIN — OXYCODONE HYDROCHLORIDE 5 MILLIGRAM(S): 5 TABLET ORAL at 06:00

## 2020-07-26 RX ADMIN — Medication 0.1 MILLIGRAM(S): at 05:05

## 2020-07-26 RX ADMIN — OXYCODONE HYDROCHLORIDE 5 MILLIGRAM(S): 5 TABLET ORAL at 18:13

## 2020-07-26 RX ADMIN — Medication 100 MILLIGRAM(S): at 09:17

## 2020-07-26 RX ADMIN — OXYCODONE HYDROCHLORIDE 5 MILLIGRAM(S): 5 TABLET ORAL at 05:06

## 2020-07-26 RX ADMIN — CEFEPIME 100 MILLIGRAM(S): 1 INJECTION, POWDER, FOR SOLUTION INTRAMUSCULAR; INTRAVENOUS at 18:00

## 2020-07-26 RX ADMIN — OXYCODONE HYDROCHLORIDE 5 MILLIGRAM(S): 5 TABLET ORAL at 13:00

## 2020-07-26 RX ADMIN — Medication 650 MILLIGRAM(S): at 10:00

## 2020-07-26 RX ADMIN — Medication 50 MILLIGRAM(S): at 12:09

## 2020-07-26 RX ADMIN — Medication 500 MILLIGRAM(S): at 09:17

## 2020-07-26 RX ADMIN — Medication 650 MILLIGRAM(S): at 08:05

## 2020-07-26 RX ADMIN — ENOXAPARIN SODIUM 40 MILLIGRAM(S): 100 INJECTION SUBCUTANEOUS at 09:17

## 2020-07-26 RX ADMIN — Medication 650 MILLIGRAM(S): at 12:00

## 2020-07-26 RX ADMIN — Medication 0.1 MILLIGRAM(S): at 21:57

## 2020-07-26 RX ADMIN — Medication 50 MILLIGRAM(S): at 18:13

## 2020-07-26 RX ADMIN — FAMOTIDINE 20 MILLIGRAM(S): 10 INJECTION INTRAVENOUS at 21:57

## 2020-07-26 RX ADMIN — Medication 1 APPLICATION(S): at 06:00

## 2020-07-26 RX ADMIN — Medication 1 TABLET(S): at 09:17

## 2020-07-26 RX ADMIN — Medication 1 MILLIGRAM(S): at 21:57

## 2020-07-26 RX ADMIN — Medication 1 APPLICATION(S): at 09:18

## 2020-07-26 RX ADMIN — Medication 650 MILLIGRAM(S): at 21:57

## 2020-07-26 RX ADMIN — Medication 5 MILLIGRAM(S): at 21:57

## 2020-07-26 RX ADMIN — CHLORHEXIDINE GLUCONATE 15 MILLILITER(S): 213 SOLUTION TOPICAL at 09:18

## 2020-07-26 RX ADMIN — CEFEPIME 100 MILLIGRAM(S): 1 INJECTION, POWDER, FOR SOLUTION INTRAMUSCULAR; INTRAVENOUS at 05:06

## 2020-07-26 RX ADMIN — OXYCODONE HYDROCHLORIDE 5 MILLIGRAM(S): 5 TABLET ORAL at 12:09

## 2020-07-26 RX ADMIN — FAMOTIDINE 20 MILLIGRAM(S): 10 INJECTION INTRAVENOUS at 09:17

## 2020-07-26 RX ADMIN — Medication 0.1 MILLIGRAM(S): at 13:35

## 2020-07-26 NOTE — PROGRESS NOTE ADULT - SUBJECTIVE AND OBJECTIVE BOX
Events Overnight: Patient still with low grade fevers.    HPI:       38 yo F admitted 6/24 presented with MRSA bacteremia, NATALIA, transaminitis, metabolic lactic acidosis,   respiratory failure requiring intubation now s/p trach (7/13), septic shock 2/2 MRSA bacteremia and         Fungemia, treated. The patient was on trach collar and was transferred to floor awaiting placement       on 7/22 pm redeveloped acute respiratory failure, placed back on vent with fever.  Follow up sputum culture is negative    ROS - difficult to obtain as patient is intubated,  less abdominal discomfort, anxious at times, tachypneic wiith weaning     MEDICATIONS  (STANDING):  ascorbic acid 500 milliGRAM(s) Oral daily  cefepime   IVPB 2000 milliGRAM(s) IV Intermittent every 12 hours  cefepime   IVPB      chlorhexidine 0.12% Liquid 15 milliLiter(s) Oral Mucosa every 12 hours  clonazePAM  Tablet 1 milliGRAM(s) Oral two times a day  cloNIDine 0.1 milliGRAM(s) Oral every 8 hours  collagenase Ointment 1 Application(s) Topical daily  Dakins Solution - 1/4 Strength 1 Application(s) Topical daily  doxycycline hyclate Capsule 100 milliGRAM(s) Oral every 12 hours  enoxaparin Injectable 40 milliGRAM(s) SubCutaneous daily  famotidine    Tablet 20 milliGRAM(s) Oral two times a day  folic acid 1 milliGRAM(s) Oral daily  melatonin 5 milliGRAM(s) Oral at bedtime  multivitamin 1 Tablet(s) Oral daily  silver sulfADIAZINE 1% Cream 1 Application(s) Topical daily  thiamine 100 milliGRAM(s) Oral daily    MEDICATIONS  (PRN):  acetaminophen   Tablet .. 650 milliGRAM(s) Oral every 4 hours PRN Temp greater or equal to 38C (100.4F), Mild Pain (1 - 3)  glucagon  Injectable 1 milliGRAM(s) IntraMuscular once PRN Glucose <70 milliGRAM(s)/deciLiter  hydrOXYzine hydrochloride 50 milliGRAM(s) Oral every 4 hours PRN Anxiety  loperamide 2 milliGRAM(s) Oral every 4 hours PRN Loose stool  oxyCODONE    IR 5 milliGRAM(s) Oral every 6 hours PRN Moderate Pain (4 - 6)    ICU Vital Signs Last 24 Hrs  T(C): 38.3 (26 Jul 2020 08:00), Max: 38.3 (26 Jul 2020 06:00)  T(F): 100.9 (26 Jul 2020 08:00), Max: 100.9 (26 Jul 2020 06:00)  HR: 108 (26 Jul 2020 10:00) (97 - 125)  BP: 113/78 (26 Jul 2020 10:00) (106/70 - 119/91)  BP(mean): 85 (26 Jul 2020 10:00) (78 - 97)  ABP: --  ABP(mean): --  RR: 24 (26 Jul 2020 10:00) (18 - 35)  SpO2: 96% (26 Jul 2020 10:00) (77% - 100%)    25 Jul 2020 07:01  -  26 Jul 2020 07:00  --------------------------------------------------------  IN: 1991 mL / OUT: 1450 mL / NET: 541 mL    DVT Prophylaxis:   lovenox                                                              Advanced Directives: Full Code

## 2020-07-26 NOTE — PROGRESS NOTE ADULT - SUBJECTIVE AND OBJECTIVE BOX
Date of service: 7/26/2020  Chief Complaint: B/L lower extremity wounds     HPI: Pt was examined at bedside by podiatry with attending present, for B/L lower extremity ulcerative lesions. Pt is a 40 yo female who has been admitted to the CCU for septic shock, UTI with MRSA and bacteremia. Noted Pt is currently intubated and non verbal, she is alert and oriented. The patient is also being treated for anemia, acute respiratory failure and toxic metabolic encephalopathy. Of Note, Pt has a history of IVDA drug use and is critically ill and at risk for acute decompensation possible death per the intensivist. Due to the patient being intubated all additional medical history and information was gathered from the patients chart.    7/26: Patient seen and examined by podiatry for follow-up evaluation of right and left lower extremity wounds in the ICU with attending present. Patient noted be arousable at bedside and patient s/p tracheostomy procedure performed on 7/13/2020 and PEG placed on 7/16/2020. Patient noted to be responsive to external stimuli when her lower extremities are evaluated by Podiatry. No overnight events or acute changes to patient's condition noted in patient's chart. All further HPI obtained via patient's chart.    REVIEW OF SYSTEMS:  Unable to assess due to pt's inability to communicate per intubation    Allergies:  morphine (Unknown)    Past Medical History:  Lupus, RA, Reynard's Epilepsy, IVDA     Family History:  No pertinent family history in first degree relatives: cannot recall family history at this time    Social History:  lives alone  single  Vital Signs Last 24 Hrs  T(C): 37.1 (26 Jul 2020 12:00), Max: 38.3 (26 Jul 2020 06:00)  T(F): 98.8 (26 Jul 2020 12:00), Max: 100.9 (26 Jul 2020 06:00)  HR: 98 (26 Jul 2020 13:00) (97 - 125)  BP: 117/84 (26 Jul 2020 13:00) (106/70 - 119/91)  BP(mean): 92 (26 Jul 2020 13:00) (78 - 97)  RR: 28 (26 Jul 2020 13:00) (18 - 35)  SpO2: 100% (26 Jul 2020 13:00) (77% - 100%)smokes 4 cig/day  uses two bags of heroin a day (24 Jun 2020 01:21)    Physical examination:  Constitutional: Pt intubated, lying in bed    Focused LE examination:   Vasc: DP and PT pulses non palpable b/l, CFT < 5 x 10, TG: warm to warm  Neuro and MSK: Unable to assess due to patient's condition    Derm:  Rt LE  - Full thickness ulcer with exposed peroneal tendons on lateral aspect of distal RLE that + probes to Fibula, no drainage, + undermining all around the clock, + tenderness. Negative crepitus or fluctuance to area of lateral aspect of the distal RLE.  - Rt 5th toe demonstrates a full thickness with less fibronecotic wound bed and evidence of increasing granulation tissue compared to the prior examination, measuring approximately 7.2 x 4.6 x 0.8 cm, - purulence, - malodor, - fluctuance +Probe to 5th metatarsal head, EDL tendon exposed and + undermining from the medial aspect, + tunneling to the 9 hour.     Lt LE:  -Full thickness ulcer round in shape and approx  0.5 cm distal to lateral malleolus, tunneling to 3 and 6 o'clock positions, + undermining all around, - drainage, - fluctuance - malodor, + probe to bone to the lateral malleolus, - purulence; negative crepitus and - fluctuance  - Multiple Full thickness ulcerations with exposed Achilles tendon noted to the posterior surface of the left distal 1/3 aspect of the left leg.  -Swelling and mild edema noted over the medial malleolus of LLE. Intact serous blister noted to the medial malleolar region. Edema noted to the dorsal-lateral aspect of the left fifth MPJ, noted to be mostly likely pressure induced; area of black-blue discoloration noted to the the left fifth sub met 5 region.   -Partial thickness ulcer, 4th interdigital space with presence of dry blood, - probe to bone, - undermining - tunneling  - Non gradable eschar on 3rd digit on Lt foot  - Full thickness ulceration noted to the dorsum of the left 1st MPJ. Positive probing to the left 1st MPJ. Positive purulence exuded from the wound with milking of the left foot.  - Full thickness ulceration noted to the lateral aspect of the upper 1/3 aspect of the leg at the level of the fibular notch. + tendon exposed; no drainage with palpation of the periwound area; no malodor exuded from the wound, negative fluctuance and negative crepitus to the area. Full thickness ulceration L fibular notch area noted to be covered with Alleyvn pad.   - Full thickness ulceration noted to plantar aspect of left foot sub metatarsal 5 region. + Serous drainage noted with milking of the left foot. - fluctuance or crepitus felt to the sub met 5 left foot. No malodor exuded from drainage to the left foot. - probe to bone, - undermining and - tunneling.       Labs:  Complete Blood Count + Automated Diff (07.23.20 @ 12:20)    WBC Count: 9.06 K/uL    RBC Count: 2.59 M/uL    Hemoglobin: 7.5 g/dL    Hematocrit: 24.4 %    Mean Cell Volume: 94.2 fl    Mean Cell Hemoglobin: 29.0 pg    Mean Cell Hemoglobin Conc: 30.7 gm/dL    Red Cell Distrib Width: 15.1 %    Platelet Count - Automated: 480 K/uL    Auto Neutrophil #: 7.58 K/uL    Auto Lymphocyte #: 0.75 K/uL    Auto Monocyte #: 0.46 K/uL    Auto Eosinophil #: 0.13 K/uL    Auto Basophil #: 0.04 K/uL    Auto Neutrophil %: 83.7: Differential percentages must be correlated with absolute numbers for  clinical significance. %    Auto Lymphocyte %: 8.3 %    Auto Monocyte %: 5.1 %    Auto Eosinophil %: 1.4 %    Auto Basophil %: 0.4 %    Auto Immature Granulocyte %: 1.1 %    Basic Metabolic Panel in AM (07.18.20 @ 11:52)    Sodium, Serum: 136 mmol/L    Potassium, Serum: 4.9 mmol/L    Chloride, Serum: 109 mmol/L    Carbon Dioxide, Serum: 19 mmol/L    Anion Gap, Serum: 8 mmol/L    Blood Urea Nitrogen, Serum: 30 mg/dL    Creatinine, Serum: 0.91 mg/dL    Glucose, Serum: 90 mg/dL    Calcium, Total Serum: 7.8 mg/dL    eGFR if Non : 80: Interpretative comment  The units for eGFR are mL/min/1.73M2 (normalized body surface area). The  eGFR is calculated from a serum creatinine using the CKD-EPI equation.  Other variables required for calculation are race, age and sex. Among  patients with chronic kidney disease (CKD), the eGFR is useful in  determining the stage of disease according to KDOQI CKD classification.  All eGFR results are reported numerically with the following  interpretation.          GFR                    With                 Without     (ml/min/1.73 m2)    Kidney Damage       Kidney Damage        >= 90                    Stage 1                     Normal        60-89                    Stage 2                     Decreased GFR        30-59     Stage 3                     Stage 3        15-29                    Stage 4                     Stage 4        < 15                      Stage 5                     Stage 5  Each stage of CKD assumes that the associated GFR level has been in  effect for at least 3 months. Determination of stages one and two (with  eGFR > 59 ml/min/m2) requires estimation of kidney damage for at least 3  months as defined by structural or functional abnormalities.  Limitations: All estimates of GFR will be less accurate for patients at  extremes of muscle mass (including but not limited to frail elderly,  critically ill, or cancer patients), those with unusual diets, and those  with conditions associated with reduced secretion or extrarenal  elimination of creatinine. The eGFR equation is not recommended for use  in patients with unstable creatinine levels. mL/min/1.73M2    eGFR if African American: 93 mL/min/1.73M2     Culture - Blood in AM (07.06.20 @ 14:30)    Gram Stain:   Growth in aerobic bottle: Yeast like cells    -  5-Flucytosine: N/I For Candida species, no interpretation available for any invitro susceptibility testing.    -  Amphotericin B: N/I 0.25    -  Andulafungin: N/I 0.12    -  Caspofungin: N/I 0.06 CASPOFUNGIN: is indicated in the treatment of candidemia, intra-abdominal abscess, peritonitis, pleural space infections and esophageal candidiasis.    -  Fluconazole: N/I <=0.12 Fluconazole = Data established for mucosal disease, and is generally applicable for invasive disease.  Susceptibility is dependent on achieving the maximal possible blood level.  Doses of 400 MG/day or more may be required in adults with normal renalfunction and body habitus.    -  Itraconazole: N/I For Candida species, no interpretation available for any invitro susceptibility testing.    -  Micafungin: N/I 0.015    -  Posaconazole: N/I 0.015    -  Voriconazole: N/I <=0.008 VORICONAZOLE: The KRYSTEN has been determined by the colormetric microdilution method.    -  Multidrug (KPC pos) resistant organism: Nondet    -  Staphylococcus aureus: Nondet    -  Methicillin resistant Staphylococcus aureus (MRSA): Nondet    -  Coagulase negative Staphylococcus: Nondet    -  Enterococcus species: Nondet    -  Vancomycin resistant Enterococcus sp.: Nondet    -  Escherichia coli: Nondet    -  Klebsiella oxytoca: Nondet    -  Klebsiella pneumoniae: Nondet    -  Serratia marcescens: Nondet    -  Haemophilus influenzae: Nondet    -  Listeria monocytogenes: Nondet    -  Neisseria meningitidis: Nondet    -  Pseudomonas aeruginosa: Nondet    -  Acinetobacter baumanii: Nondet    -  Enterobacter cloacae complex: Nondet    -  Streptococcus sp. (Not Grp A, B or S pneumoniae): Nondet    -  Streptococcus agalactiae (Group B): Nondet    -  Streptococcus pyogenes (Group A): Nondet    -  Streptococcus pneumoniae: Nondet    -  Candida albicans: Nondet    -  Candida glabrata: Nondet    -  Candida krusei: Nondet    -  Candida parapsilosis: Nondet    -  Candida tropicalis: Nondet    -  Interpretation: See note This test method is not approved by the FDA and is for research use only.  The performance characteristics of this assay may have been determined by [facility] and [facility], [place], [place]                            N/I - No interpretation available                                                         SDD – Sensitive Dose Dependent    Specimen Source: .Blood Blood-Peripheral    Organism: Blood Culture PCR    Organism: Brittny dubliniensis    Culture Results:   Growth in aerobic bottle: Brittny dubliniensis  "Due to technical problems, Proteus sp. will Not be reported as part of  the BCID panel until further notice"  ***Blood Panel PCR results on this specimen are available  approximately 3 hours after the Gram stain result.***  Gram stain, PCR, and/or culture results may not always  correspond due to difference in methodologies.  ************************************************************  This PCR assay was performed using [employer].  The following targets are tested for: Enterococcus,  vancomycin resistant enterococci, Listeria monocytogenes,  coagulase negative staphylococci, S. aureus,  methicillin resistant S. aureus, Streptococcus agalactiae  (Group B), S. pneumoniae, S. pyogenes (Group A),  Acinetobacter baumannii, Enterobacter cloacae, E. coli,  Klebsiella oxytoca, K. pneumoniae, Proteus sp.,  Serratia marcescens, Haemophilus influenzae,  Neisseria meningitidis, Pseudomonas aeruginosa, Candida  albicans, C. glabrata, C krusei, C parapsilosis,  C. tropicalis and the KPC resistance gene.    Organism Identification: Blood Culture PCR  Candida dubliniensis    Method Type: PCR    Method Type: YSTMIC      Imaging:    < from: US Duplex Arterial Lower Ext Compl, Bilateral (07.14.20 @ 14:49) >  IMPRESSION:  No evidence of a hemodynamically significant stenosis or occlusion in the visualized lower extremity arteries.  Subcutaneous edema throughout both lower extremities with fluid collections in both groins.  Monophasic waveforms throughout both lower extremities.  < end of copied text >      < from: TTE Echo Complete w/o Contrast w/ Doppler (06.27.20 @ 08:58) >   Impression   Summary   The mid to apical anterolateral to anterior segments are severely   hypokinetic. The apical inferoseptal wall appears hypokinetic.   Estimated left ventricular ejection fraction is 45-50 %.   A catheter wire is seen in the right atrium.   Mild (1+) tricuspid valve regurgitation is present.   Mild pulmonary hypertension.  < end of copied text >      < from: Xray Ankle Complete 3 Views, Bilateral (07.09.20 @ 16:42) >  EXAM:  XR ANKLE COMP MIN 3 VIEWS BI                        *** ADDENDUM 07/10/2020  ***                                                           Addendum:    The posterior soft tissue gas of the left ankle may be related to skin ulcerationor early fistulous tract formation. Clinical correlation is suggested.  *** END OF ADDENDUM 07/10/2020  ***  PROCEDURE DATE:  07/09/2020    INTERPRETATION:  Bilateral ankles  HISTORY: Bilateral ulcers    Three views of the right ankle and three views of the left ankle show no evidence of fracture nor destructive change. The joint spaces are maintained.  Subcutaneous soft tissue gas is present behind the left ankle.  IMPRESSION: Soft tissue gas as noted.  Thank you for this referral  ***Please see the addendum at the top of this report. It may contain additional important information or changes.****  < end of copied text >

## 2020-07-26 NOTE — PROGRESS NOTE ADULT - ADDITIONAL PE
right foot with exposed tendon  left foot with ulcers, no moe pus  sacral decubitus
UE's/Hands both extensively swollen/edematous, extensive track marks on UEs/LE's B/L
RIJ CVL in situ

## 2020-07-26 NOTE — PROGRESS NOTE ADULT - NEUROLOGICAL
detailed exam
Alert & oriented; no sensory, motor or coordination deficits, normal reflexes
Alert & oriented; no sensory, motor or coordination deficits, normal reflexes
detailed exam
Alert & oriented; no sensory, motor or coordination deficits, normal reflexes
Alert & oriented; no sensory, motor or coordination deficits, normal reflexes
detailed exam
Alert & oriented; no sensory, motor or coordination deficits, normal reflexes
detailed exam

## 2020-07-26 NOTE — PROGRESS NOTE ADULT - ASSESSMENT
Assessment: 40 y/o female seen by podiatry at bedside in the ICU for the followin.) Full Thickness Fibronecrotic Ulcer of Right fifth digit  2.) Full Thickness Fibronecrotic Ulcer with exposed peroneal tendon, lateral aspect RLE  3.) Full thickness ulcer with exposed Achilles tendon LT  4.) Full thickness ulcer to lateral malleolus, LLE  5) Full thickness ulceration, dorsum of left 1st MTPJ  6) Partial Thickness ulcer 4th interspace, Lt foot  7) Full thickness ulceration, upper 1/3 aspect of left leg at level of fibular notch  8) Non gradable Eschar of 3rd digit, Lt foot  9) Multiple hyperpigmented scar-like lesions scattered all over LE, bl  10) Serous blister, medial malleolus L side  11) Full thickness ulceration, left foot plantar sub metatarsal 5 region  12) Pain in B/L Lower Extremities     Plan:  - Chart reviewed and patient evaluated by Podiatry team with attending present.  - X-rays of the b/l LEs reviewed at this time. Case discussed with radiologist in depth. Please note oval radiolucencies visualized in the b/l LEs X-rays are due to air in exposed open wounds.   - Final wound Cx. left 1st MTPJ full-thickness ulceration, reviewed  - All wounds to b/l LEs irrigated with copious amounts of sterile saline.  - SSD and adaptic applied over the right dorsolateral forefoot ulceration.   - Alleyvn pad and SSD applied over exposed peroneal tendons to RLE.   - Adaptic applied over exposed Achilles tendon of LLE.   - Silvadene applied to wounds of L 3rd digit and L lateral malleolus ulceration.  - Silvadene applied to full-thickness ulceration left foot submet 5 region.   - The b/l feet and ankles were wrapped with 4x4 gauze, ABD pad and kerlix, then secured with tape.   - Patient's right and left heels to be offloaded in b/l Z-flex boots to be worn at all times when patient bedbound.  - Vascular Consult, appreciated.   - Discussed case with ID department given purulence exuded from full thickness ulceration to left 1st MPJ and that deep wound cx was taken, Abx per ID appreciated. Antibiotic regimen per ID appreciated.  - Discussed with CCU doctor the case, and recommendations, appreciated. Discussed case with Medicine department as well now that patient on Med/Surg inpatient unit.  - Discussed findings of full-thickness ulceration at level of left fibular notch with Vascular surgery and Wound care nursing departments. Local wound care of full-thickness ulceration at level of left fibular notch per Wound care nursing department in addition to care of patient's sacral ulceration, appreciated.  - No podiatric surgical intervention necessary at this time as no abscess collections found to b/l lower extremities   - At this time, podiatry will provide supportive, and noninvasive wound care to the wounds, b/l lower extremities, given the fact that the patient is deemed in non stable medically and at risk for acute decompensation.  - When patient is deemed stable for discharge by all other medical specialties, patient instructed to f/u at the [facility] for continued podiatric care. Daily local wound care instructions outlined below.   - Patient to be discharged with b/l surgical shoes for ambulation upon future discharge.  - Nutrition supplementation per Dietician given albumin level decreased, appreciated.   - Podiatry will follow the case closely while in house.     DAILY LOCAL WOUND CARE INSTRUCTIONS  Please irrigate all wounds to b/l LEs copious amounts of sterile saline.  - Apply Silvadene and adaptic over the right dorsolateral forefoot ulceration.   - Apply Alleyvn pad and SSD over exposed peroneal tendons to right leg.   - Apply Adaptic over exposed Achilles tendon of Left lower extremity.   - Apply Silvadene to wounds of Left 3rd digit and Left lateral malleolus ulceration.  - Apply Silvadene to the full-thickness ulceration to the bottom of the left foot underneath the left fifth toe.  - Lastly, wrap the bilateral lower extremities with 4x4 gauze, ABD pads and kerlix and then secure with tape.

## 2020-07-26 NOTE — PROGRESS NOTE ADULT - ASSESSMENT
Patient s/p MRSA bacteremia  s/p Fungemia   on cefepime, for left lower lobe infiltrate on CT of chest., sputum culture negative so far, slight effusion  still with low grade fevers  with respiratory failure , will give weaning trial   blood cultures negative, fevers better  oob to chair  Lovenox - dvt prophylaxis  continue physical therapy and tube feeds

## 2020-07-26 NOTE — PROGRESS NOTE ADULT - RESPIRATORY COMMENTS
dec bs at left base
clear, minimal secretions
clear, tachypneic with weaning
dec bs at bases
lungs clear , tachypneic with weaning
scattered rhonchi
tachypneic with weanng
tachypneic with weaning
tachypneic with weaning
tachypneic

## 2020-07-26 NOTE — PROGRESS NOTE ADULT - CONSTITUTIONAL COMMENTS
comfortable, weak
cachetic
cachetic . ill
cachetic chronically ill
cachetic, chronically ill
frail weak
ill frail
weak
ill
cachetic weak
pale; emaciated; chronically ill appearing; extensive areas of cellulitis and scarring from drug injection on both UE's and LE's
chronically ill/cachetic
Pale, chronically ill appearing

## 2020-07-27 PROCEDURE — 99232 SBSQ HOSP IP/OBS MODERATE 35: CPT

## 2020-07-27 RX ADMIN — Medication 50 MILLIGRAM(S): at 21:30

## 2020-07-27 RX ADMIN — Medication 100 MILLIGRAM(S): at 09:50

## 2020-07-27 RX ADMIN — CEFEPIME 100 MILLIGRAM(S): 1 INJECTION, POWDER, FOR SOLUTION INTRAMUSCULAR; INTRAVENOUS at 06:07

## 2020-07-27 RX ADMIN — Medication 2 MILLIGRAM(S): at 09:50

## 2020-07-27 RX ADMIN — Medication 0.1 MILLIGRAM(S): at 21:30

## 2020-07-27 RX ADMIN — OXYCODONE HYDROCHLORIDE 5 MILLIGRAM(S): 5 TABLET ORAL at 06:06

## 2020-07-27 RX ADMIN — Medication 1 APPLICATION(S): at 09:51

## 2020-07-27 RX ADMIN — CHLORHEXIDINE GLUCONATE 15 MILLILITER(S): 213 SOLUTION TOPICAL at 21:31

## 2020-07-27 RX ADMIN — Medication 1 MILLIGRAM(S): at 09:50

## 2020-07-27 RX ADMIN — Medication 1 APPLICATION(S): at 09:52

## 2020-07-27 RX ADMIN — CEFEPIME 100 MILLIGRAM(S): 1 INJECTION, POWDER, FOR SOLUTION INTRAMUSCULAR; INTRAVENOUS at 18:53

## 2020-07-27 RX ADMIN — Medication 500 MILLIGRAM(S): at 09:49

## 2020-07-27 RX ADMIN — Medication 650 MILLIGRAM(S): at 04:00

## 2020-07-27 RX ADMIN — OXYCODONE HYDROCHLORIDE 5 MILLIGRAM(S): 5 TABLET ORAL at 21:30

## 2020-07-27 RX ADMIN — CHLORHEXIDINE GLUCONATE 15 MILLILITER(S): 213 SOLUTION TOPICAL at 09:51

## 2020-07-27 RX ADMIN — Medication 0.1 MILLIGRAM(S): at 06:07

## 2020-07-27 RX ADMIN — Medication 650 MILLIGRAM(S): at 19:41

## 2020-07-27 RX ADMIN — OXYCODONE HYDROCHLORIDE 5 MILLIGRAM(S): 5 TABLET ORAL at 06:12

## 2020-07-27 RX ADMIN — Medication 100 MILLIGRAM(S): at 21:30

## 2020-07-27 RX ADMIN — OXYCODONE HYDROCHLORIDE 5 MILLIGRAM(S): 5 TABLET ORAL at 14:38

## 2020-07-27 RX ADMIN — ENOXAPARIN SODIUM 40 MILLIGRAM(S): 100 INJECTION SUBCUTANEOUS at 09:48

## 2020-07-27 RX ADMIN — Medication 50 MILLIGRAM(S): at 10:43

## 2020-07-27 RX ADMIN — FAMOTIDINE 20 MILLIGRAM(S): 10 INJECTION INTRAVENOUS at 09:50

## 2020-07-27 RX ADMIN — Medication 5 MILLIGRAM(S): at 21:30

## 2020-07-27 RX ADMIN — Medication 1 TABLET(S): at 09:49

## 2020-07-27 RX ADMIN — Medication 2 MILLIGRAM(S): at 14:38

## 2020-07-27 RX ADMIN — FAMOTIDINE 20 MILLIGRAM(S): 10 INJECTION INTRAVENOUS at 21:30

## 2020-07-27 NOTE — PROGRESS NOTE ADULT - ASSESSMENT
Patient s/p MRSA bacteremia  s/p Fungemia   on cefepime, for left lower lobe infiltrate on CT of chest., sputum culture negative so far, larger L effusion will have TS review and see it they want to tap and R/O empyema  Continue TC   oob to chair  Lovenox - dvt prophylaxis  continue physical therapy and tube feeds    Transfer to a floor with O2 sat monitoring    Called into the Hospitalist at 12:13PM

## 2020-07-27 NOTE — PROGRESS NOTE ADULT - MS EXT PE MLT D E PC
pedal edema/no cyanosis
pedal edema
no cyanosis
pedal edema
pedal edema
pedal edema/no cyanosis
pedal edema
no clubbing/no cyanosis
pedal edema

## 2020-07-27 NOTE — CHART NOTE - NSCHARTNOTEFT_GEN_A_CORE
*40 yo F admitted 6/24 presented with MRSA bacteremia, NATALIA, transaminitis, metabolic lactic acidosis,   respiratory failure requiring intubation now s/p trach (7/13), septic shock 2/2 MRSA bacteremia and  Fungemia, treated. The patient was on trach collar and was transferred to floor awaiting placement on 7/22 pm redeveloped acute respiratory failure, placed back on vent with fever.    *urine output: 1100mL.  BM (+) 7/25.  (+3) Lt/Rt hand edema.    *per flowsheet, pt has received an average of ~1046mL/day of Jevity 1.5 over the past 3 days.  combined with prosource TF provided ~1690Kcal and 100g protein.  Which meets 100% of estimated calorie/protein needs at top of estimated nutr need range.    *pt with continued wt loss, however, pt with less edema than last assessment.  likely some wt loss 2/2 fluid loss as pt now meeting estimated nutr needs for IBW and wound healing.     Recommendations:  1) c/w  Jevity 1.5 @ 50mL/hr with 3pkts Prosource TF.  2) monitor hydration status; adjust free water flushes prn  3) monitor TF tolerance; keep back of bed > 35 degrees  4) weekly wt checks to track/trend changes      Diet Prescription: Diet, NPO with Tube Feed:   Tube Feeding Modality: Gastrostomy  Jevity 1.5 Rick (JEVITY1.5)  Total Volume for 24 Hours (mL): 1200  Continuous  Until Goal Tube Feed Rate (mL per Hour): 50  Tube Feed Duration (in Hours): 24  Tube Feed Start Time: 00:00  No Carb Prosource TF     Qty per Day:  3 (07-23-20 @ 11:14)      Wt Hx:  106.7# (7/26)  110.6# (7/22)[BMI 18.6]  128.3# (7/15)  121.6# (7/7)  6/28- 125.2# Admission weight 6/25- 116.6#   Height (cm): 165.1 (06-23-20 @ 20:37)  Weight (kg): 54.4 (06-23-20 @ 20:37)  BMI (kg/m2): 20 (06-23-20 @ 20:37)      07-22-20 @ 07:01 - 07-23-20 @ 07:00  --------------------------------------------------------  IN: 1390 mL / OUT: 220 mL / NET: 1170 mL        Estimated Needs:   Weight Used for Calculation: 57Kg (IBW for calories and current BW for protein)    Estimated Energy Needs ( 25-30 calories/kg): 2611-8276 calories  Estimated Protein Needs (1.8-2 gm/kg): 90-100gm protein  Estimated Fluid Needs (<20 ml/kg):1000ml          Pertinent Labs:  07-26 Na138 mmol/L Glu 99 mg/dL K+ 4.4 mmol/L Cr  0.79 mg/dL BUN 34 mg/dL<H> Phos n/a   Alb n/a   PAB n/a         Skin: john paul score = 14  unstageable PU on sacrum    Monitoring and Evaluation:   [x] PO intake/Nutr support infusion [ x ] Tolerance to Nutr [ x ] weights [ x ] labs[ x ] follow up per protocol  [ ] other: *38 yo F admitted 6/24 presented with MRSA bacteremia, NATALIA, transaminitis, metabolic lactic acidosis,   respiratory failure requiring intubation now s/p trach (7/13), septic shock 2/2 MRSA bacteremia and  Fungemia, treated. The patient was on trach collar and was transferred to floor awaiting placement on 7/22 pm redeveloped acute respiratory failure, placed back on vent with fever.    *urine output: 1100mL.  BM (+) 7/25.  (+3) Lt/Rt hand edema.    *per flowsheet, pt has received an average of ~1046mL/day of Jevity 1.5 over the past 3 days.  combined with prosource TF provided ~1690Kcal and 100g protein.  Which meets 100% of estimated calorie/protein needs at top of estimated nutr need range.    *pt with continued wt loss, however, pt with less edema than last assessment.  likely some wt loss 2/2 fluid loss as pt now meeting estimated nutr needs for IBW and wound healing.  Will continue to monitor and adjust TF prn.    Recommendations:  1) c/w  Jevity 1.5 @ 50mL/hr with 3pkts Prosource TF.  2) monitor hydration status; adjust free water flushes prn  3) monitor TF tolerance; keep back of bed > 35 degrees  4) weekly wt checks to track/trend changes      Diet Prescription: Diet, NPO with Tube Feed:   Tube Feeding Modality: Gastrostomy  Jevity 1.5 Rick (JEVITY1.5)  Total Volume for 24 Hours (mL): 1200  Continuous  Until Goal Tube Feed Rate (mL per Hour): 50  Tube Feed Duration (in Hours): 24  Tube Feed Start Time: 00:00  No Carb Prosource TF     Qty per Day:  3 (07-23-20 @ 11:14)      Wt Hx:  106.7# (7/26)  110.6# (7/22)[BMI 18.6]  128.3# (7/15)  121.6# (7/7)  6/28- 125.2# Admission weight 6/25- 116.6#   Height (cm): 165.1 (06-23-20 @ 20:37)  Weight (kg): 54.4 (06-23-20 @ 20:37)  BMI (kg/m2): 20 (06-23-20 @ 20:37)      07-22-20 @ 07:01  -  07-23-20 @ 07:00  --------------------------------------------------------  IN: 1390 mL / OUT: 220 mL / NET: 1170 mL        Estimated Needs:   Weight Used for Calculation: 57Kg (IBW for calories and current BW for protein)    Estimated Energy Needs ( 25-30 calories/kg): 7225-2896 calories  Estimated Protein Needs (1.8-2 gm/kg): 90-100gm protein  Estimated Fluid Needs (<20 ml/kg):1000ml          Pertinent Labs:  07-26 Na138 mmol/L Glu 99 mg/dL K+ 4.4 mmol/L Cr  0.79 mg/dL BUN 34 mg/dL<H> Phos n/a   Alb n/a   PAB n/a         Skin: john paul score = 14  unstageable PU on sacrum    Monitoring and Evaluation:   [x] PO intake/Nutr support infusion [ x ] Tolerance to Nutr [ x ] weights [ x ] labs[ x ] follow up per protocol  [ ] other:

## 2020-07-27 NOTE — PROGRESS NOTE ADULT - GASTROINTESTINAL DETAILS
no rebound tenderness/no guarding/soft/nontender
no distention/soft/nontender/bowel sounds normal
no guarding/no rebound tenderness/nontender/soft
nontender/no distention
soft
soft/nontender
nontender/no rebound tenderness/soft/no guarding
no rebound tenderness/no guarding/nontender/soft
soft/no distention
soft/bowel sounds normal/no distention/nontender

## 2020-07-27 NOTE — PROGRESS NOTE ADULT - COMMENTS
HARRY due to intubation, sedation, tox metabolic encephalopathy
Unobtainable due to clinical condition
No HA, CO, SOb, minimal abdominal discomfort
Unobtainable due to clinical condition
pt offers no complaints/no pain but grossly unable to follow/participate in ROS due to non verbal state

## 2020-07-27 NOTE — PROGRESS NOTE ADULT - GASTROINTESTINAL
detailed exam
detailed exam
Soft, non-tender, no hepatosplenomegaly, normal bowel sounds
detailed exam
Soft, non-tender, no hepatosplenomegaly, normal bowel sounds
detailed exam
Soft, non-tender, no hepatosplenomegaly, normal bowel sounds
detailed exam

## 2020-07-27 NOTE — PROGRESS NOTE ADULT - CONSTITUTIONAL DETAILS
cachectic/ill
cachectic/ill
cachectic/no distress
ill/cachectic
ill/cachectic
cachectic
cachectic/ill
cachectic/ill
ill.  Appears much older than stated age/cachectic
cachectic

## 2020-07-27 NOTE — PROGRESS NOTE ADULT - SUBJECTIVE AND OBJECTIVE BOX
HPI:       38 yo F admitted 6/24 presented with MRSA bacteremia, NATALIA, transaminitis, metabolic lactic acidosis,  respiratory failure requiring intubation now s/p trach (7/13), septic shock 2/2 MRSA bacteremia and         Fungemia, treated. The patient was on trach collar and was transferred to floor awaiting placement       on 7/22 pm redeveloped acute respiratory failure, placed back on vent with fever    7/27: Chart reviewed, patient continue to do well on trach collar.  Minimal secretions, patient awake and conversant.           PAST MEDICAL & SURGICAL HISTORY:  Smoker  Heroin abuse  H/O Raynaud's syndrome  Rheumatoid arthritis  Lupus  Gall bladder stones  H/O appendicitis  H/O tubal ligation      FAMILY HISTORY:  No pertinent family history in first degree relatives: cannot recall family history at this time      Social Hx:    Allergies    morphine (Unknown)    Intolerances            ICU Vital Signs Last 24 Hrs  T(C): 36.8 (27 Jul 2020 08:00), Max: 38 (26 Jul 2020 22:00)  T(F): 98.2 (27 Jul 2020 08:00), Max: 100.4 (26 Jul 2020 22:00)  HR: 93 (27 Jul 2020 11:00) (82 - 111)  BP: 120/91 (27 Jul 2020 11:00) (106/75 - 126/89)  BP(mean): 96 (27 Jul 2020 11:00) (77 - 96)  ABP: --  ABP(mean): --  RR: 36 (27 Jul 2020 11:00) (18 - 36)  SpO2: 100% (27 Jul 2020 11:00) (93% - 100%)          I&O's Summary    26 Jul 2020 07:01  -  27 Jul 2020 07:00  --------------------------------------------------------  IN: 1608 mL / OUT: 1100 mL / NET: 508 mL            07-26    138  |  106  |  34<H>  ----------------------------<  99  4.4   |  23  |  0.79    Ca    7.9<L>      26 Jul 2020 09:10                      MEDICATIONS  (STANDING):  ascorbic acid 500 milliGRAM(s) Oral daily  cefepime   IVPB 2000 milliGRAM(s) IV Intermittent every 12 hours  cefepime   IVPB      chlorhexidine 0.12% Liquid 15 milliLiter(s) Oral Mucosa every 12 hours  cloNIDine 0.1 milliGRAM(s) Oral every 8 hours  collagenase Ointment 1 Application(s) Topical daily  Dakins Solution - 1/4 Strength 1 Application(s) Topical daily  doxycycline hyclate Capsule 100 milliGRAM(s) Oral every 12 hours  enoxaparin Injectable 40 milliGRAM(s) SubCutaneous daily  famotidine    Tablet 20 milliGRAM(s) Oral two times a day  folic acid 1 milliGRAM(s) Oral daily  melatonin 5 milliGRAM(s) Oral at bedtime  multivitamin 1 Tablet(s) Oral daily  silver sulfADIAZINE 1% Cream 1 Application(s) Topical daily  thiamine 100 milliGRAM(s) Oral daily    MEDICATIONS  (PRN):  acetaminophen   Tablet .. 650 milliGRAM(s) Oral every 4 hours PRN Temp greater or equal to 38C (100.4F), Mild Pain (1 - 3)  glucagon  Injectable 1 milliGRAM(s) IntraMuscular once PRN Glucose <70 milliGRAM(s)/deciLiter  hydrOXYzine hydrochloride 50 milliGRAM(s) Oral every 4 hours PRN Anxiety  loperamide 2 milliGRAM(s) Oral every 4 hours PRN Loose stool  oxyCODONE    IR 5 milliGRAM(s) Oral every 6 hours PRN Moderate Pain (4 - 6)      DVT Prophylaxis: Lovenox    Advanced Directives:  Discussed with:    Visit Information:     ** Time is exclusive of billed procedures and/or teaching and/or routine family updates.

## 2020-07-27 NOTE — PROGRESS NOTE ADULT - SUBJECTIVE AND OBJECTIVE BOX
Subjective: No acute events overnight.    Vital Signs:  Vital Signs Last 24 Hrs  T(C): 36.8 (07-27-20 @ 08:00), Max: 38 (07-26-20 @ 22:00)  T(F): 98.2 (07-27-20 @ 08:00), Max: 100.4 (07-26-20 @ 22:00)  HR: 93 (07-27-20 @ 11:00) (82 - 111)  BP: 120/91 (07-27-20 @ 11:00) (106/75 - 126/89)  RR: 36 (07-27-20 @ 11:00) (18 - 36)  SpO2: 100% (07-27-20 @ 11:00) (93% - 100%) on TC 40%    Telemetry/Alarms: sinus rhythm    Relevant labs, radiology and Medications reviewed    07-26    138  |  106  |  34<H>  ----------------------------<  99  4.4   |  23  |  0.79    Ca    7.9<L>      26 Jul 2020 09:10        MEDICATIONS  (STANDING):  ascorbic acid 500 milliGRAM(s) Oral daily  cefepime   IVPB 2000 milliGRAM(s) IV Intermittent every 12 hours  cefepime   IVPB      chlorhexidine 0.12% Liquid 15 milliLiter(s) Oral Mucosa every 12 hours  cloNIDine 0.1 milliGRAM(s) Oral every 8 hours  collagenase Ointment 1 Application(s) Topical daily  Dakins Solution - 1/4 Strength 1 Application(s) Topical daily  doxycycline hyclate Capsule 100 milliGRAM(s) Oral every 12 hours  enoxaparin Injectable 40 milliGRAM(s) SubCutaneous daily  famotidine    Tablet 20 milliGRAM(s) Oral two times a day  folic acid 1 milliGRAM(s) Oral daily  melatonin 5 milliGRAM(s) Oral at bedtime  multivitamin 1 Tablet(s) Oral daily  silver sulfADIAZINE 1% Cream 1 Application(s) Topical daily  thiamine 100 milliGRAM(s) Oral daily    MEDICATIONS  (PRN):  acetaminophen   Tablet .. 650 milliGRAM(s) Oral every 4 hours PRN Temp greater or equal to 38C (100.4F), Mild Pain (1 - 3)  glucagon  Injectable 1 milliGRAM(s) IntraMuscular once PRN Glucose <70 milliGRAM(s)/deciLiter  hydrOXYzine hydrochloride 50 milliGRAM(s) Oral every 4 hours PRN Anxiety  loperamide 2 milliGRAM(s) Oral every 4 hours PRN Loose stool  oxyCODONE    IR 5 milliGRAM(s) Oral every 6 hours PRN Moderate Pain (4 - 6)      Physical exam  Gen: NAD breathing comfortably  Neuro: AO x 3  Card: S1 S2  Pulm: CTA - trach in place  Abd: Soft ND  Ext: edema is improving    I&O's Summary    26 Jul 2020 07:01  -  27 Jul 2020 07:00  --------------------------------------------------------  IN: 1608 mL / OUT: 1100 mL / NET: 508 mL        Assessment  39y Female  w/ PAST MEDICAL & SURGICAL HISTORY:  Smoker  Heroin abuse  H/O Raynaud's syndrome  Rheumatoid arthritis  Lupus  Gall bladder stones  H/O appendicitis  H/O tubal ligation    39 Female h/o lupus, RA, epilepsy (last seizure 8 yrs ago), chronic pain, IVDA admitted w weakness, failure to thrive. Found to have acute anemia, NATALIA, transaminitis, metabolic lactic acidosis, severe dehydration, severe protein calorie malnutrition. Severe sepsis with septic shock secondary to MRSA bacteremia and UTI treated with a two-week course of Vancomycin, developed worsening respiratory failure and was ultimately intubated on 6/27/20.  s/p tracheostomy 7/13/20 currently being treated for fungemia. PEG placed on 7/16/20.  Transferred to ICU on 7/22 w respiratory distress - loculated left pleural effusion, now on TC.    PEG care.  TF to goal.  Continue TC as tolerated.  No intervention planned for pleural effusion at this time.    Discussed with Cardiothoracic Team at AM rounds. Subjective: No acute events overnight.    Vital Signs:  Vital Signs Last 24 Hrs  T(C): 36.8 (07-27-20 @ 08:00), Max: 38 (07-26-20 @ 22:00)  T(F): 98.2 (07-27-20 @ 08:00), Max: 100.4 (07-26-20 @ 22:00)  HR: 93 (07-27-20 @ 11:00) (82 - 111)  BP: 120/91 (07-27-20 @ 11:00) (106/75 - 126/89)  RR: 36 (07-27-20 @ 11:00) (18 - 36)  SpO2: 100% (07-27-20 @ 11:00) (93% - 100%) on TC 40%    Telemetry/Alarms: sinus rhythm    Relevant labs, radiology and Medications reviewed    07-26    138  |  106  |  34<H>  ----------------------------<  99  4.4   |  23  |  0.79    Ca    7.9<L>      26 Jul 2020 09:10        MEDICATIONS  (STANDING):  ascorbic acid 500 milliGRAM(s) Oral daily  cefepime   IVPB 2000 milliGRAM(s) IV Intermittent every 12 hours  cefepime   IVPB      chlorhexidine 0.12% Liquid 15 milliLiter(s) Oral Mucosa every 12 hours  cloNIDine 0.1 milliGRAM(s) Oral every 8 hours  collagenase Ointment 1 Application(s) Topical daily  Dakins Solution - 1/4 Strength 1 Application(s) Topical daily  doxycycline hyclate Capsule 100 milliGRAM(s) Oral every 12 hours  enoxaparin Injectable 40 milliGRAM(s) SubCutaneous daily  famotidine    Tablet 20 milliGRAM(s) Oral two times a day  folic acid 1 milliGRAM(s) Oral daily  melatonin 5 milliGRAM(s) Oral at bedtime  multivitamin 1 Tablet(s) Oral daily  silver sulfADIAZINE 1% Cream 1 Application(s) Topical daily  thiamine 100 milliGRAM(s) Oral daily    MEDICATIONS  (PRN):  acetaminophen   Tablet .. 650 milliGRAM(s) Oral every 4 hours PRN Temp greater or equal to 38C (100.4F), Mild Pain (1 - 3)  glucagon  Injectable 1 milliGRAM(s) IntraMuscular once PRN Glucose <70 milliGRAM(s)/deciLiter  hydrOXYzine hydrochloride 50 milliGRAM(s) Oral every 4 hours PRN Anxiety  loperamide 2 milliGRAM(s) Oral every 4 hours PRN Loose stool  oxyCODONE    IR 5 milliGRAM(s) Oral every 6 hours PRN Moderate Pain (4 - 6)      Physical exam  Gen: NAD breathing comfortably  Neuro: AO x 3  Card: S1 S2  Pulm: CTA - trach in place  Abd: Soft ND  Ext: edema is improving    I&O's Summary    26 Jul 2020 07:01  -  27 Jul 2020 07:00  --------------------------------------------------------  IN: 1608 mL / OUT: 1100 mL / NET: 508 mL        Assessment  39y Female  w/ PAST MEDICAL & SURGICAL HISTORY:  Smoker  Heroin abuse  H/O Raynaud's syndrome  Rheumatoid arthritis  Lupus  Gall bladder stones  H/O appendicitis  H/O tubal ligation    39 Female h/o lupus, RA, epilepsy (last seizure 8 yrs ago), chronic pain, IVDA admitted w weakness, failure to thrive. Found to have acute anemia, NATALIA, transaminitis, metabolic lactic acidosis, severe dehydration, severe protein calorie malnutrition. Severe sepsis with septic shock secondary to MRSA bacteremia and UTI treated with a two-week course of Vancomycin, developed worsening respiratory failure and was ultimately intubated on 6/27/20.  s/p tracheostomy 7/13/20 currently being treated for fungemia. PEG placed on 7/16/20.  Transferred to ICU on 7/22 w respiratory distress - loculated left pleural effusion, now on TC.    PEG care.  TF to goal.  Continue TC as tolerated.  No intervention planned for pleural effusion at this time - if patient continues to spike fevers can request CT guided thoracentesis by IR since patient will be unable to sit up for ultrasound guided procedure.	    Discussed with Cardiothoracic Team at AM rounds.

## 2020-07-27 NOTE — PROGRESS NOTE ADULT - [facility] PE MLT D E PC
regular rate and rhythm
regular rate and rhythm/sinus tachyc
regular rate and rhythm
regular rate and rhythm

## 2020-07-27 NOTE — PROGRESS NOTE ADULT - RS GEN PE MLT RESP DETAILS PC
breath sounds equal
good air movement/airway patent/breath sounds equal/respirations non-labored
good air movement/breath sounds equal
good air movement/breath sounds equal
breath sounds equal
breath sounds equal/good air movement
good air movement/breath sounds equal
normal
no rhonchi/no rales/breath sounds equal
normal
airway patent/good air movement/breath sounds equal/respirations labored
bilateral rhonchi
good air movement/breath sounds equal

## 2020-07-27 NOTE — PROGRESS NOTE ADULT - SUBJECTIVE AND OBJECTIVE BOX
Date of service: 7/27/2020  Chief Complaint: B/L lower extremity wounds     HPI: Pt was examined at bedside by podiatry with attending present, for B/L lower extremity ulcerative lesions. Pt is a 40 yo female who has been admitted to the CCU for septic shock, UTI with MRSA and bacteremia. Noted Pt is currently intubated and non verbal, she is alert and oriented. The patient is also being treated for anemia, acute respiratory failure and toxic metabolic encephalopathy. Of Note, Pt has a history of IVDA drug use and is critically ill and at risk for acute decompensation possible death per the intensivist. Due to the patient being intubated all additional medical history and information was gathered from the patients chart.    7/27: Patient seen and examined by podiatry for follow-up evaluation of right and left lower extremity wounds in the ICU with attending present. Patient noted be arousable at bedside and patient s/p tracheostomy procedure performed on 7/13/2020 and PEG placed on 7/16/2020. Patient noted to be responsive to external stimuli when her lower extremities are evaluated by Podiatry. No overnight events or acute changes to patient's condition noted in patient's chart. All further HPI obtained via patient's chart.    REVIEW OF SYSTEMS:  Unable to assess due to pt's inability to communicate per intubation    Allergies:  morphine (Unknown)    Past Medical History:  Lupus, RA, Reynard's Epilepsy, IVDA     Family History:  No pertinent family history in first degree relatives: cannot recall family history at this time    Social History:  lives alone  single     MEDICATIONS  (STANDING):  ascorbic acid 500 milliGRAM(s) Oral daily  cefepime   IVPB 2000 milliGRAM(s) IV Intermittent every 12 hours  cefepime   IVPB      chlorhexidine 0.12% Liquid 15 milliLiter(s) Oral Mucosa every 12 hours  cloNIDine 0.1 milliGRAM(s) Oral every 8 hours  collagenase Ointment 1 Application(s) Topical daily  Dakins Solution - 1/4 Strength 1 Application(s) Topical daily  doxycycline hyclate Capsule 100 milliGRAM(s) Oral every 12 hours  enoxaparin Injectable 40 milliGRAM(s) SubCutaneous daily  famotidine    Tablet 20 milliGRAM(s) Oral two times a day  folic acid 1 milliGRAM(s) Oral daily  melatonin 5 milliGRAM(s) Oral at bedtime  multivitamin 1 Tablet(s) Oral daily  silver sulfADIAZINE 1% Cream 1 Application(s) Topical daily  thiamine 100 milliGRAM(s) Oral daily    MEDICATIONS  (PRN):  acetaminophen   Tablet .. 650 milliGRAM(s) Oral every 4 hours PRN Temp greater or equal to 38C (100.4F), Mild Pain (1 - 3)  glucagon  Injectable 1 milliGRAM(s) IntraMuscular once PRN Glucose <70 milliGRAM(s)/deciLiter  hydrOXYzine hydrochloride 50 milliGRAM(s) Oral every 4 hours PRN Anxiety  loperamide 2 milliGRAM(s) Oral every 4 hours PRN Loose stool  oxyCODONE    IR 5 milliGRAM(s) Oral every 6 hours PRN Moderate Pain (4 - 6)      Vital Signs Last 24 Hrs  T(C): 36.7 (27 Jul 2020 04:00), Max: 38 (26 Jul 2020 22:00)  T(F): 98.1 (27 Jul 2020 04:00), Max: 100.4 (26 Jul 2020 22:00)  HR: 84 (27 Jul 2020 09:00) (82 - 111)  BP: 115/76 (27 Jul 2020 09:00) (106/75 - 126/89)  BP(mean): 85 (27 Jul 2020 09:00) (77 - 96)  RR: 26 (27 Jul 2020 09:00) (24 - 35)  SpO2: 99% (27 Jul 2020 09:00) (93% - 100%)      Physical examination:  Constitutional: Pt intubated, lying in bed    Focused LE examination:   Vasc: DP and PT pulses non palpable b/l, CFT < 5 x 10, TG: warm to warm  Neuro and MSK: Unable to assess due to patient's condition    Derm:  Rt LE  - Full thickness ulcer with exposed peroneal tendons on lateral aspect of distal RLE that + probes to Fibula, no drainage, + undermining all around the clock, + tenderness. Negative crepitus or fluctuance to area of lateral aspect of the distal RLE.  - Rt 5th toe demonstrates a full thickness with less fibronecotic wound bed and evidence of increasing granulation tissue compared to the prior examination, measuring approximately 7.2 x 4.6 x 0.8 cm, - purulence, - malodor, - fluctuance +Probe to 5th metatarsal head, EDL tendon exposed and + undermining from the medial aspect, + tunneling to the 9 hour.     Lt LE:  -Full thickness ulcer round in shape and approx  0.5 cm distal to lateral malleolus, tunneling to 3 and 6 o'clock positions, + undermining all around, - drainage, - fluctuance - malodor, + probe to bone to the lateral malleolus, - purulence; negative crepitus and - fluctuance  - Multiple Full thickness ulcerations with exposed Achilles tendon noted to the posterior surface of the left distal 1/3 aspect of the left leg.  -Swelling and mild edema noted over the medial malleolus of LLE. Intact serous blister noted to the medial malleolar region. Edema noted to the dorsal-lateral aspect of the left fifth MPJ, noted to be mostly likely pressure induced; area of black-blue discoloration noted to the the left fifth sub met 5 region.   -Partial thickness ulcer, 4th interdigital space with presence of dry blood, - probe to bone, - undermining - tunneling  - Non gradable eschar on 3rd digit on Lt foot  - Full thickness ulceration noted to the dorsum of the left 1st MPJ. Positive probing to the left 1st MPJ. Positive purulence exuded from the wound with milking of the left foot.  - Full thickness ulceration noted to the lateral aspect of the upper 1/3 aspect of the leg at the level of the fibular notch. + tendon exposed; no drainage with palpation of the periwound area; no malodor exuded from the wound, negative fluctuance and negative crepitus to the area. Full thickness ulceration L fibular notch area noted to be covered with Alleyvn pad.   - Full thickness ulceration noted to plantar aspect of left foot sub metatarsal 5 region. + Serous drainage noted with milking of the left foot. - fluctuance or crepitus felt to the sub met 5 left foot. No malodor exuded from drainage to the left foot. - probe to bone, - undermining and - tunneling.       Labs:  Complete Blood Count + Automated Diff (07.23.20 @ 12:20)    WBC Count: 9.06 K/uL    RBC Count: 2.59 M/uL    Hemoglobin: 7.5 g/dL    Hematocrit: 24.4 %    Mean Cell Volume: 94.2 fl    Mean Cell Hemoglobin: 29.0 pg    Mean Cell Hemoglobin Conc: 30.7 gm/dL    Red Cell Distrib Width: 15.1 %    Platelet Count - Automated: 480 K/uL    Auto Neutrophil #: 7.58 K/uL    Auto Lymphocyte #: 0.75 K/uL    Auto Monocyte #: 0.46 K/uL    Auto Eosinophil #: 0.13 K/uL    Auto Basophil #: 0.04 K/uL    Auto Neutrophil %: 83.7: Differential percentages must be correlated with absolute numbers for  clinical significance. %    Auto Lymphocyte %: 8.3 %    Auto Monocyte %: 5.1 %    Auto Eosinophil %: 1.4 %    Auto Basophil %: 0.4 %    Auto Immature Granulocyte %: 1.1 %      Complete Blood Count + Automated Diff (07.23.20 @ 12:20)    WBC Count: 9.06 K/uL    RBC Count: 2.59 M/uL    Hemoglobin: 7.5 g/dL    Hematocrit: 24.4 %    Mean Cell Volume: 94.2 fl    Mean Cell Hemoglobin: 29.0 pg    Mean Cell Hemoglobin Conc: 30.7 gm/dL    Red Cell Distrib Width: 15.1 %    Platelet Count - Automated: 480 K/uL    Auto Neutrophil #: 7.58 K/uL    Auto Lymphocyte #: 0.75 K/uL    Auto Monocyte #: 0.46 K/uL    Auto Eosinophil #: 0.13 K/uL    Auto Basophil #: 0.04 K/uL    Auto Neutrophil %: 83.7: Differential percentages must be correlated with absolute numbers for  clinical significance. %    Auto Lymphocyte %: 8.3 %    Auto Monocyte %: 5.1 %    Auto Eosinophil %: 1.4 %    Auto Basophil %: 0.4 %    Auto Immature Granulocyte %: 1.1 %           Culture - Blood in AM (07.06.20 @ 14:30)    Gram Stain:   Growth in aerobic bottle: Yeast like cells    -  5-Flucytosine: N/I For Candida species, no interpretation available for any invitro susceptibility testing.    -  Amphotericin B: N/I 0.25    -  Andulafungin: N/I 0.12    -  Caspofungin: N/I 0.06 CASPOFUNGIN: is indicated in the treatment of candidemia, intra-abdominal abscess, peritonitis, pleural space infections and esophageal candidiasis.    -  Fluconazole: N/I <=0.12 Fluconazole = Data established for mucosal disease, and is generally applicable for invasive disease.  Susceptibility is dependent on achieving the maximal possible blood level.  Doses of 400 MG/day or more may be required in adults with normal renalfunction and body habitus.    -  Itraconazole: N/I For Candida species, no interpretation available for any invitro susceptibility testing.    -  Micafungin: N/I 0.015    -  Posaconazole: N/I 0.015    -  Voriconazole: N/I <=0.008 VORICONAZOLE: The KRYSTEN has been determined by the colormetric microdilution method.    -  Multidrug (KPC pos) resistant organism: Nondet    -  Staphylococcus aureus: Nondet    -  Methicillin resistant Staphylococcus aureus (MRSA): Nondet    -  Coagulase negative Staphylococcus: Nondet    -  Enterococcus species: Nondet    -  Vancomycin resistant Enterococcus sp.: Nondet    -  Escherichia coli: Nondet    -  Klebsiella oxytoca: Nondet    -  Klebsiella pneumoniae: Nondet    -  Serratia marcescens: Nondet    -  Haemophilus influenzae: Nondet    -  Listeria monocytogenes: Nondet    -  Neisseria meningitidis: Nondet    -  Pseudomonas aeruginosa: Nondet    -  Acinetobacter baumanii: Nondet    -  Enterobacter cloacae complex: Nondet    -  Streptococcus sp. (Not Grp A, B or S pneumoniae): Nondet    -  Streptococcus agalactiae (Group B): Nondet    -  Streptococcus pyogenes (Group A): Nondet    -  Streptococcus pneumoniae: Nondet    -  Candida albicans: Nondet    -  Candida glabrata: Nondet    -  Candida krusei: Nondet    -  Candida parapsilosis: Nondet    -  Candida tropicalis: Nondet    -  Interpretation: See note This test method is not approved by the FDA and is for research use only.  The performance characteristics of this assay may have been determined by [employer] and [facility], [place], [place]                            N/I - No interpretation available                                                         SDD – Sensitive Dose Dependent    Specimen Source: .Blood Blood-Peripheral    Organism: Blood Culture PCR    Organism: Brittny dubliniensis    Culture Results:   Growth in aerobic bottle: Brittny dubliniensis  "Due to technical problems, Proteus sp. will Not be reported as part of  the BCID panel until further notice"  ***Blood Panel PCR results on this specimen are available  approximately 3 hours after the Gram stain result.***  Gram stain, PCR, and/or culture results may not always  correspond due to difference in methodologies.  ************************************************************  This PCR assay was performed using [employer].  The following targets are tested for: Enterococcus,  vancomycin resistant enterococci, Listeria monocytogenes,  coagulase negative staphylococci, S. aureus,  methicillin resistant S. aureus, Streptococcus agalactiae  (Group B), S. pneumoniae, S. pyogenes (Group A),  Acinetobacter baumannii, Enterobacter cloacae, E. coli,  Klebsiella oxytoca, K. pneumoniae, Proteus sp.,  Serratia marcescens, Haemophilus influenzae,  Neisseria meningitidis, Pseudomonas aeruginosa, Candida  albicans, C. glabrata, C krusei, C parapsilosis,  C. tropicalis and the KPC resistance gene.    Organism Identification: Blood Culture PCR  Candida dubliniensis    Method Type: PCR    Method Type: YSTMIC      Imaging:    < from: US Duplex Arterial Lower Ext Compl, Bilateral (07.14.20 @ 14:49) >  IMPRESSION:  No evidence of a hemodynamically significant stenosis or occlusion in the visualized lower extremity arteries.  Subcutaneous edema throughout both lower extremities with fluid collections in both groins.  Monophasic waveforms throughout both lower extremities.  < end of copied text >      < from: TTE Echo Complete w/o Contrast w/ Doppler (06.27.20 @ 08:58) >   Impression   Summary   The mid to apical anterolateral to anterior segments are severely   hypokinetic. The apical inferoseptal wall appears hypokinetic.   Estimated left ventricular ejection fraction is 45-50 %.   A catheter wire is seen in the right atrium.   Mild (1+) tricuspid valve regurgitation is present.   Mild pulmonary hypertension.  < end of copied text >      < from: Xray Ankle Complete 3 Views, Bilateral (07.09.20 @ 16:42) >  EXAM:  XR ANKLE COMP MIN 3 VIEWS BI                        *** ADDENDUM 07/10/2020  ***                                                           Addendum:    The posterior soft tissue gas of the left ankle may be related to skin ulcerationor early fistulous tract formation. Clinical correlation is suggested.  *** END OF ADDENDUM 07/10/2020  ***  PROCEDURE DATE:  07/09/2020    INTERPRETATION:  Bilateral ankles  HISTORY: Bilateral ulcers    Three views of the right ankle and three views of the left ankle show no evidence of fracture nor destructive change. The joint spaces are maintained.  Subcutaneous soft tissue gas is present behind the left ankle.  IMPRESSION: Soft tissue gas as noted.  Thank you for this referral  ***Please see the addendum at the top of this report. It may contain additional important information or changes.****  < end of copied text >

## 2020-07-27 NOTE — PROGRESS NOTE ADULT - CARDIOVASCULAR
detailed exam
Regular rate & rhythm, normal S1, S2; no murmurs, gallops or rubs; no S3, S4
detailed exam
detailed exam
Regular rate & rhythm, normal S1, S2; no murmurs, gallops or rubs; no S3, S4
detailed exam
detailed exam
Regular rate & rhythm, normal S1, S2; no murmurs, gallops or rubs; no S3, S4
detailed exam
Regular rate & rhythm, normal S1, S2; no murmurs, gallops or rubs; no S3, S4
detailed exam
Regular rate & rhythm, normal S1, S2; no murmurs, gallops or rubs; no S3, S4
detailed exam

## 2020-07-28 DIAGNOSIS — F41.9 ANXIETY DISORDER, UNSPECIFIED: ICD-10-CM

## 2020-07-28 LAB
CULTURE RESULTS: SIGNIFICANT CHANGE UP
SARS-COV-2 RNA SPEC QL NAA+PROBE: SIGNIFICANT CHANGE UP
SPECIMEN SOURCE: SIGNIFICANT CHANGE UP

## 2020-07-28 PROCEDURE — 99233 SBSQ HOSP IP/OBS HIGH 50: CPT

## 2020-07-28 PROCEDURE — 99232 SBSQ HOSP IP/OBS MODERATE 35: CPT

## 2020-07-28 RX ORDER — ALPRAZOLAM 0.25 MG
0.5 TABLET ORAL
Refills: 0 | Status: DISCONTINUED | OUTPATIENT
Start: 2020-07-28 | End: 2020-07-31

## 2020-07-28 RX ADMIN — Medication 100 MILLIGRAM(S): at 11:02

## 2020-07-28 RX ADMIN — Medication 50 MILLIGRAM(S): at 02:12

## 2020-07-28 RX ADMIN — Medication 0.5 MILLIGRAM(S): at 12:47

## 2020-07-28 RX ADMIN — Medication 500 MILLIGRAM(S): at 11:02

## 2020-07-28 RX ADMIN — OXYCODONE HYDROCHLORIDE 5 MILLIGRAM(S): 5 TABLET ORAL at 17:33

## 2020-07-28 RX ADMIN — OXYCODONE HYDROCHLORIDE 5 MILLIGRAM(S): 5 TABLET ORAL at 18:29

## 2020-07-28 RX ADMIN — Medication 100 MILLIGRAM(S): at 11:03

## 2020-07-28 RX ADMIN — Medication 1 APPLICATION(S): at 11:02

## 2020-07-28 RX ADMIN — CHLORHEXIDINE GLUCONATE 15 MILLILITER(S): 213 SOLUTION TOPICAL at 11:03

## 2020-07-28 RX ADMIN — FAMOTIDINE 20 MILLIGRAM(S): 10 INJECTION INTRAVENOUS at 11:02

## 2020-07-28 RX ADMIN — OXYCODONE HYDROCHLORIDE 5 MILLIGRAM(S): 5 TABLET ORAL at 23:34

## 2020-07-28 RX ADMIN — Medication 0.1 MILLIGRAM(S): at 05:39

## 2020-07-28 RX ADMIN — OXYCODONE HYDROCHLORIDE 5 MILLIGRAM(S): 5 TABLET ORAL at 11:23

## 2020-07-28 RX ADMIN — CHLORHEXIDINE GLUCONATE 15 MILLILITER(S): 213 SOLUTION TOPICAL at 21:40

## 2020-07-28 RX ADMIN — CEFEPIME 100 MILLIGRAM(S): 1 INJECTION, POWDER, FOR SOLUTION INTRAMUSCULAR; INTRAVENOUS at 06:13

## 2020-07-28 RX ADMIN — Medication 1 TABLET(S): at 11:02

## 2020-07-28 RX ADMIN — Medication 50 MILLIGRAM(S): at 06:13

## 2020-07-28 RX ADMIN — Medication 5 MILLIGRAM(S): at 21:41

## 2020-07-28 RX ADMIN — Medication 0.1 MILLIGRAM(S): at 12:47

## 2020-07-28 RX ADMIN — Medication 1 MILLIGRAM(S): at 11:02

## 2020-07-28 RX ADMIN — OXYCODONE HYDROCHLORIDE 5 MILLIGRAM(S): 5 TABLET ORAL at 11:08

## 2020-07-28 RX ADMIN — ENOXAPARIN SODIUM 40 MILLIGRAM(S): 100 INJECTION SUBCUTANEOUS at 11:02

## 2020-07-28 RX ADMIN — FAMOTIDINE 20 MILLIGRAM(S): 10 INJECTION INTRAVENOUS at 21:41

## 2020-07-28 RX ADMIN — Medication 0.1 MILLIGRAM(S): at 21:40

## 2020-07-28 RX ADMIN — OXYCODONE HYDROCHLORIDE 5 MILLIGRAM(S): 5 TABLET ORAL at 03:13

## 2020-07-28 RX ADMIN — Medication 50 MILLIGRAM(S): at 11:08

## 2020-07-28 RX ADMIN — Medication 50 MILLIGRAM(S): at 17:36

## 2020-07-28 RX ADMIN — Medication 2 MILLIGRAM(S): at 20:58

## 2020-07-28 RX ADMIN — Medication 1 APPLICATION(S): at 11:36

## 2020-07-28 RX ADMIN — Medication 100 MILLIGRAM(S): at 21:41

## 2020-07-28 RX ADMIN — CEFEPIME 100 MILLIGRAM(S): 1 INJECTION, POWDER, FOR SOLUTION INTRAMUSCULAR; INTRAVENOUS at 17:34

## 2020-07-28 NOTE — PROGRESS NOTE ADULT - SUBJECTIVE AND OBJECTIVE BOX
Subjective:  no acute events overnight.    Vital Signs:  Vital Signs Last 24 Hrs  T(C): 37.1 (07-28-20 @ 09:03), Max: 37.1 (07-27-20 @ 20:15)  T(F): 98.8 (07-28-20 @ 09:03), Max: 98.8 (07-27-20 @ 20:15)  HR: 97 (07-28-20 @ 09:03) (97 - 115)  BP: 115/89 (07-28-20 @ 09:03) (101/72 - 127/80)  RR: 18 (07-28-20 @ 09:03) (18 - 33)  SpO2: 100% (07-28-20 @ 09:03) (97% - 100%) on TC      Relevant labs, radiology and Medications reviewed    MEDICATIONS  (STANDING):  ALPRAZolam 0.5 milliGRAM(s) Oral two times a day  ascorbic acid 500 milliGRAM(s) Oral daily  cefepime   IVPB 2000 milliGRAM(s) IV Intermittent every 12 hours  cefepime   IVPB      chlorhexidine 0.12% Liquid 15 milliLiter(s) Oral Mucosa every 12 hours  cloNIDine 0.1 milliGRAM(s) Oral every 8 hours  collagenase Ointment 1 Application(s) Topical daily  Dakins Solution - 1/4 Strength 1 Application(s) Topical daily  doxycycline hyclate Capsule 100 milliGRAM(s) Oral every 12 hours  enoxaparin Injectable 40 milliGRAM(s) SubCutaneous daily  famotidine    Tablet 20 milliGRAM(s) Oral two times a day  folic acid 1 milliGRAM(s) Oral daily  melatonin 5 milliGRAM(s) Oral at bedtime  multivitamin 1 Tablet(s) Oral daily  silver sulfADIAZINE 1% Cream 1 Application(s) Topical daily  thiamine 100 milliGRAM(s) Oral daily    MEDICATIONS  (PRN):  acetaminophen   Tablet .. 650 milliGRAM(s) Oral every 4 hours PRN Temp greater or equal to 38C (100.4F), Mild Pain (1 - 3)  glucagon  Injectable 1 milliGRAM(s) IntraMuscular once PRN Glucose <70 milliGRAM(s)/deciLiter  hydrOXYzine hydrochloride 50 milliGRAM(s) Oral every 4 hours PRN Anxiety  loperamide 2 milliGRAM(s) Oral every 4 hours PRN Loose stool  oxyCODONE    IR 5 milliGRAM(s) Oral every 6 hours PRN Moderate Pain (4 - 6)      Physical exam  Gen: NAD breathing comfortably  Neuro: AO x 3   Card: S1 S2  Pulm: CTA  Abd: soft PEG in place  Ext: no edema      I&O's Summary    27 Jul 2020 07:01  -  28 Jul 2020 07:00  --------------------------------------------------------  IN: 659 mL / OUT: 300 mL / NET: 359 mL        Assessment  39y Female  w/ PAST MEDICAL & SURGICAL HISTORY:  Smoker  Heroin abuse  H/O Raynaud's syndrome  Rheumatoid arthritis  Lupus  Gall bladder stones  H/O appendicitis  H/O tubal ligation    39 Female h/o lupus, RA, epilepsy (last seizure 8 yrs ago), chronic pain, IVDA admitted w weakness, failure to thrive. Found to have acute anemia, NATALIA, transaminitis, metabolic lactic acidosis, severe dehydration, severe protein calorie malnutrition. Severe sepsis with septic shock secondary to MRSA bacteremia and UTI treated with a two-week course of Vancomycin, developed worsening respiratory failure and was ultimately intubated on 6/27/20.  s/p tracheostomy 7/13/20 currently being treated for fungemia. PEG placed on 7/16/20.  Transferred to ICU on 7/22 w respiratory distress - loculated left pleural effusion, now on TC.    PEG care.  TF to goal.  Continue TC as tolerated.  No intervention planned for pleural effusion at this time - if patient continues to spike fevers can request CT guided thoracentesis by IR since patient will be unable to sit up for ultrasound guided procedure.	    Discussed with Cardiothoracic Team at AM rounds.

## 2020-07-28 NOTE — PROGRESS NOTE ADULT - SUBJECTIVE AND OBJECTIVE BOX
CC/reason for follow up: sepsis, resp failure    S: communicates by writing or whispering/silent lip movement. denies any dyspnea. feels anxious.     ROS: no chest pain, no dyspnea    T(C): 37.1 (07-28-20 @ 09:03), Max: 37.1 (07-27-20 @ 20:15)  HR: 98 (07-28-20 @ 12:50) (97 - 115)  BP: 117/76 (07-28-20 @ 12:50) (115/89 - 127/80)  RR: 18 (07-28-20 @ 09:03) (18 - 33)  SpO2: 100% (07-28-20 @ 09:03) (97% - 100%)    Gen - chronically ill appearing, cooperative, in no acute distress  HEENT- PERRL, moist mucus membranes, OP clear. poor and partial dentition   CV - RRR, No m/r/g, +pulses, +edema of b/l hands  Lungs - Good effort, diminished BS in the bases bilaterally. trach collar intact  Abdomen - Soft, nontender, nondistended, +BS, No rebound/rigidity/guarding  Ext - No cyanosis/clubbing.   Skin - multiple ulcers/wounds at different stages throughout body/extremities. No jaundice  Psych- Alert & oriented   Neuro- no facial droop, EOMI. moves all ext    MEDICATIONS  (STANDING):  ALPRAZolam 0.5 milliGRAM(s) Oral two times a day  ascorbic acid 500 milliGRAM(s) Oral daily  cefepime   IVPB 2000 milliGRAM(s) IV Intermittent every 12 hours  cefepime   IVPB      chlorhexidine 0.12% Liquid 15 milliLiter(s) Oral Mucosa every 12 hours  cloNIDine 0.1 milliGRAM(s) Oral every 8 hours  collagenase Ointment 1 Application(s) Topical daily  Dakins Solution - 1/4 Strength 1 Application(s) Topical daily  doxycycline hyclate Capsule 100 milliGRAM(s) Oral every 12 hours  enoxaparin Injectable 40 milliGRAM(s) SubCutaneous daily  famotidine    Tablet 20 milliGRAM(s) Oral two times a day  folic acid 1 milliGRAM(s) Oral daily  melatonin 5 milliGRAM(s) Oral at bedtime  multivitamin 1 Tablet(s) Oral daily  silver sulfADIAZINE 1% Cream 1 Application(s) Topical daily  thiamine 100 milliGRAM(s) Oral daily    MEDICATIONS  (PRN):  acetaminophen   Tablet .. 650 milliGRAM(s) Oral every 4 hours PRN Temp greater or equal to 38C (100.4F), Mild Pain (1 - 3)  glucagon  Injectable 1 milliGRAM(s) IntraMuscular once PRN Glucose <70 milliGRAM(s)/deciLiter  hydrOXYzine hydrochloride 50 milliGRAM(s) Oral every 4 hours PRN Anxiety  loperamide 2 milliGRAM(s) Oral every 4 hours PRN Loose stool  oxyCODONE    IR 5 milliGRAM(s) Oral every 6 hours PRN Moderate Pain (4 - 6)      Diagnostic studies: personally reviewed  LABS: All Labs Reviewed:      Blood Culture: 07-23 @ 15:55  Organism --  Gram Stain Blood -- Gram Stain --  Specimen Source .Urine Clean Catch (Midstream)  Culture-Blood --      RADIOLOGY/EKG:    < from: CT Abdomen and Pelvis w/ Oral Cont (07.23.20 @ 11:42) >  IMPRESSION:   1.  Improved RIGHT lower lobe and RIGHT middle lobe pneumonia. Residual nodular opacities likely represent residual infection. Slightly increased RIGHT pleural effusion since the most recent prior study. Marked increase in LEFT pleural effusion since the prior study with new complete atelectasis/consolidation of the LEFT lower lobe.  2.  Increasing pericardial effusion.  3.  No evidence of bowel obstruction. PEG tube in place.  4.  Thickening versus incomplete distention of the cecum with nonvisualization of the appendix.  5.  Ascites with questionable thickening of the peritoneum. Limited evaluation due to lack of IV contrast material. Correlate with signs/symptoms of peritonitis.  6.  LEFT para-aortic increased soft tissue likely represents adenopathy which is stable from most recent exam but increased from study dated March 05, 2012.    < end of copied text >    < from: GRAHAM Complete w/Spect and Color (07.08.20 @ 16:19) >   Summary     No evidence of vegetations on any of the cardiac valves, structures or   central line seen at the RA-IVC junction.   Normal LV size, thickness & systolic function. EF=60-65%.   No evidence of PFO/ASD w/ 2D imaging, color doppler or injection of   agitated saline.   Small pericardial effusion noted posteriorly.    < end of copied text >    < from: Xray Chest 1 View- PORTABLE-Routine (07.26.20 @ 10:21) >  IMPRESSION:   Persistent LEFT lower lobe airspace consolidation and/or effusion..    < end of copied text >

## 2020-07-28 NOTE — PROGRESS NOTE ADULT - SUBJECTIVE AND OBJECTIVE BOX
Date of service: 7/28/2020  Chief Complaint: B/L lower extremity wounds     HPI: Pt was examined at bedside by podiatry with attending present, for B/L lower extremity ulcerative lesions. Pt is a 38 yo female who has been admitted to the CCU for septic shock, UTI with MRSA and bacteremia. Noted Pt is currently intubated and non verbal, she is alert and oriented. The patient is also being treated for anemia, acute respiratory failure and toxic metabolic encephalopathy. Of Note, Pt has a history of IVDA drug use and is critically ill and at risk for acute decompensation possible death per the intensivist. Due to the patient being intubated all additional medical history and information was gathered from the patients chart.    7/28: Patient seen and examined by podiatry for follow-up evaluation of right and left lower extremity wounds on the telemetry inpatient floor. Patient noted be arousable at bedside and patient s/p tracheostomy procedure performed on 7/13/2020 and PEG placed on 7/16/2020. Patient noted to be responsive to external stimuli when her lower extremities are evaluated by Podiatry. No overnight events or acute changes to patient's condition noted in patient's chart. All further HPI obtained via patient's chart.    REVIEW OF SYSTEMS:  Unable to assess due to pt's inability to communicate per intubation    Allergies:  morphine (Unknown)    Past Medical History:  Lupus, RA, Reynard's Epilepsy, IVDA     Family History:  No pertinent family history in first degree relatives: cannot recall family history at this time    Social History:  lives alone  single     MEDICATIONS  (STANDING):  ascorbic acid 500 milliGRAM(s) Oral daily  cefepime   IVPB 2000 milliGRAM(s) IV Intermittent every 12 hours  cefepime   IVPB      chlorhexidine 0.12% Liquid 15 milliLiter(s) Oral Mucosa every 12 hours  cloNIDine 0.1 milliGRAM(s) Oral every 8 hours  collagenase Ointment 1 Application(s) Topical daily  Dakins Solution - 1/4 Strength 1 Application(s) Topical daily  doxycycline hyclate Capsule 100 milliGRAM(s) Oral every 12 hours  enoxaparin Injectable 40 milliGRAM(s) SubCutaneous daily  famotidine    Tablet 20 milliGRAM(s) Oral two times a day  folic acid 1 milliGRAM(s) Oral daily  melatonin 5 milliGRAM(s) Oral at bedtime  multivitamin 1 Tablet(s) Oral daily  silver sulfADIAZINE 1% Cream 1 Application(s) Topical daily  thiamine 100 milliGRAM(s) Oral daily    MEDICATIONS  (PRN):  acetaminophen   Tablet .. 650 milliGRAM(s) Oral every 4 hours PRN Temp greater or equal to 38C (100.4F), Mild Pain (1 - 3)  glucagon  Injectable 1 milliGRAM(s) IntraMuscular once PRN Glucose <70 milliGRAM(s)/deciLiter  hydrOXYzine hydrochloride 50 milliGRAM(s) Oral every 4 hours PRN Anxiety  loperamide 2 milliGRAM(s) Oral every 4 hours PRN Loose stool  oxyCODONE    IR 5 milliGRAM(s) Oral every 6 hours PRN Moderate Pain (4 - 6)    Vital Signs Last 24 Hrs  T(C): 37.1 (28 Jul 2020 09:03), Max: 37.1 (27 Jul 2020 20:15)  T(F): 98.8 (28 Jul 2020 09:03), Max: 98.8 (27 Jul 2020 20:15)  HR: 97 (28 Jul 2020 09:03) (92 - 115)  BP: 115/89 (28 Jul 2020 09:03) (101/72 - 127/80)  BP(mean): 86 (27 Jul 2020 15:00) (77 - 96)  RR: 18 (28 Jul 2020 09:03) (18 - 36)  SpO2: 100% (28 Jul 2020 09:03) (97% - 100%)    Physical examination:  Constitutional: Pt intubated, lying in bed    Focused LE examination:   Vasc: DP and PT pulses non palpable b/l, CFT < 5 x 10, TG: warm to warm  Neuro and MSK: Unable to assess due to patient's condition    Derm:  Rt LE  - Full thickness ulcer with exposed peroneal tendons on lateral aspect of distal RLE that + probes to Fibula, no drainage, + undermining all around the clock, + tenderness. Negative crepitus or fluctuance to area of lateral aspect of the distal RLE.  - Rt 5th toe demonstrates a full thickness with less fibronecotic wound bed and evidence of increasing granulation tissue compared to the prior examination, measuring approximately 7.2 x 4.6 x 0.8 cm, - purulence, - malodor, - fluctuance +Probe to 5th metatarsal head, EDL tendon exposed and + undermining from the medial aspect, + tunneling to the 9 hour.     Lt LE:  -Full thickness ulcer round in shape and approx  0.5 cm distal to lateral malleolus, tunneling to 3 and 6 o'clock positions, + undermining all around, - drainage, - fluctuance - malodor, + probe to bone to the lateral malleolus, - purulence; negative crepitus and - fluctuance  - Multiple Full thickness ulcerations with exposed Achilles tendon noted to the posterior surface of the left distal 1/3 aspect of the left leg.  -Swelling and mild edema noted over the medial malleolus of LLE. Intact serous blister noted to the medial malleolar region. Edema noted to the dorsal-lateral aspect of the left fifth MPJ, noted to be mostly likely pressure induced; area of black-blue discoloration noted to the the left fifth sub met 5 region.   -Partial thickness ulcer, 4th interdigital space with presence of dry blood, - probe to bone, - undermining - tunneling  - Non gradable eschar on 3rd digit on Lt foot  - Full thickness ulceration noted to the dorsum of the left 1st MPJ. Positive probing to the left 1st MPJ. Positive purulence exuded from the wound with milking of the left foot.  - Full thickness ulceration noted to the lateral aspect of the upper 1/3 aspect of the leg at the level of the fibular notch. + tendon exposed; no drainage with palpation of the periwound area; no malodor exuded from the wound, negative fluctuance and negative crepitus to the area. Full thickness ulceration L fibular notch area noted to be covered with Alleyvn pad.   - Full thickness ulceration noted to plantar aspect of left foot sub metatarsal 5 region. + Serous drainage noted with milking of the left foot. - fluctuance or crepitus felt to the sub met 5 left foot. No malodor exuded from drainage to the left foot. - probe to bone, - undermining and - tunneling.       Labs:  Complete Blood Count + Automated Diff (07.23.20 @ 12:20)    WBC Count: 9.06 K/uL    RBC Count: 2.59 M/uL    Hemoglobin: 7.5 g/dL    Hematocrit: 24.4 %    Mean Cell Volume: 94.2 fl    Mean Cell Hemoglobin: 29.0 pg    Mean Cell Hemoglobin Conc: 30.7 gm/dL    Red Cell Distrib Width: 15.1 %    Platelet Count - Automated: 480 K/uL    Auto Neutrophil #: 7.58 K/uL    Auto Lymphocyte #: 0.75 K/uL    Auto Monocyte #: 0.46 K/uL    Auto Eosinophil #: 0.13 K/uL    Auto Basophil #: 0.04 K/uL    Auto Neutrophil %: 83.7: Differential percentages must be correlated with absolute numbers for  clinical significance. %    Auto Lymphocyte %: 8.3 %    Auto Monocyte %: 5.1 %    Auto Eosinophil %: 1.4 %    Auto Basophil %: 0.4 %    Auto Immature Granulocyte %: 1.1 %      Complete Blood Count + Automated Diff (07.23.20 @ 12:20)    WBC Count: 9.06 K/uL    RBC Count: 2.59 M/uL    Hemoglobin: 7.5 g/dL    Hematocrit: 24.4 %    Mean Cell Volume: 94.2 fl    Mean Cell Hemoglobin: 29.0 pg    Mean Cell Hemoglobin Conc: 30.7 gm/dL    Red Cell Distrib Width: 15.1 %    Platelet Count - Automated: 480 K/uL    Auto Neutrophil #: 7.58 K/uL    Auto Lymphocyte #: 0.75 K/uL    Auto Monocyte #: 0.46 K/uL    Auto Eosinophil #: 0.13 K/uL    Auto Basophil #: 0.04 K/uL    Auto Neutrophil %: 83.7: Differential percentages must be correlated with absolute numbers for  clinical significance. %    Auto Lymphocyte %: 8.3 %    Auto Monocyte %: 5.1 %    Auto Eosinophil %: 1.4 %    Auto Basophil %: 0.4 %    Auto Immature Granulocyte %: 1.1 %           Culture - Blood in AM (07.06.20 @ 14:30)    Gram Stain:   Growth in aerobic bottle: Yeast like cells    -  5-Flucytosine: N/I For Candida species, no interpretation available for any invitro susceptibility testing.    -  Amphotericin B: N/I 0.25    -  Andulafungin: N/I 0.12    -  Caspofungin: N/I 0.06 CASPOFUNGIN: is indicated in the treatment of candidemia, intra-abdominal abscess, peritonitis, pleural space infections and esophageal candidiasis.    -  Fluconazole: N/I <=0.12 Fluconazole = Data established for mucosal disease, and is generally applicable for invasive disease.  Susceptibility is dependent on achieving the maximal possible blood level.  Doses of 400 MG/day or more may be required in adults with normal renalfunction and body habitus.    -  Itraconazole: N/I For Candida species, no interpretation available for any invitro susceptibility testing.    -  Micafungin: N/I 0.015    -  Posaconazole: N/I 0.015    -  Voriconazole: N/I <=0.008 VORICONAZOLE: The KRYSTEN has been determined by the colormetric microdilution method.    -  Multidrug (KPC pos) resistant organism: Nondet    -  Staphylococcus aureus: Nondet    -  Methicillin resistant Staphylococcus aureus (MRSA): Nondet    -  Coagulase negative Staphylococcus: Nondet    -  Enterococcus species: Nondet    -  Vancomycin resistant Enterococcus sp.: Nondet    -  Escherichia coli: Nondet    -  Klebsiella oxytoca: Nondet    -  Klebsiella pneumoniae: Nondet    -  Serratia marcescens: Nondet    -  Haemophilus influenzae: Nondet    -  Listeria monocytogenes: Nondet    -  Neisseria meningitidis: Nondet    -  Pseudomonas aeruginosa: Nondet    -  Acinetobacter baumanii: Nondet    -  Enterobacter cloacae complex: Nondet    -  Streptococcus sp. (Not Grp A, B or S pneumoniae): Nondet    -  Streptococcus agalactiae (Group B): Nondet    -  Streptococcus pyogenes (Group A): Nondet    -  Streptococcus pneumoniae: Nondet    -  Candida albicans: Nondet    -  Candida glabrata: Nondet    -  Candida krusei: Nondet    -  Candida parapsilosis: Nondet    -  Candida tropicalis: Nondet    -  Interpretation: See note This test method is not approved by the FDA and is for research use only.  The performance characteristics of this assay may have been determined by [employer] and [facility], [place], [place]                            N/I - No interpretation available                                                         SDD – Sensitive Dose Dependent    Specimen Source: .Blood Blood-Peripheral    Organism: Blood Culture PCR    Organism: Brittny dubliniensis    Culture Results:   Growth in aerobic bottle: Brittny dubliniensis  "Due to technical problems, Proteus sp. will Not be reported as part of  the BCID panel until further notice"  ***Blood Panel PCR results on this specimen are available  approximately 3 hours after the Gram stain result.***  Gram stain, PCR, and/or culture results may not always  correspond due to difference in methodologies.  ************************************************************  This PCR assay was performed using [employer].  The following targets are tested for: Enterococcus,  vancomycin resistant enterococci, Listeria monocytogenes,  coagulase negative staphylococci, S. aureus,  methicillin resistant S. aureus, Streptococcus agalactiae  (Group B), S. pneumoniae, S. pyogenes (Group A),  Acinetobacter baumannii, Enterobacter cloacae, E. coli,  Klebsiella oxytoca, K. pneumoniae, Proteus sp.,  Serratia marcescens, Haemophilus influenzae,  Neisseria meningitidis, Pseudomonas aeruginosa, Candida  albicans, C. glabrata, C krusei, C parapsilosis,  C. tropicalis and the KPC resistance gene.    Organism Identification: Blood Culture PCR  Candida dubliniensis    Method Type: PCR    Method Type: YSTMIC      Imaging:    < from: US Duplex Arterial Lower Ext Compl, Bilateral (07.14.20 @ 14:49) >  IMPRESSION:  No evidence of a hemodynamically significant stenosis or occlusion in the visualized lower extremity arteries.  Subcutaneous edema throughout both lower extremities with fluid collections in both groins.  Monophasic waveforms throughout both lower extremities.  < end of copied text >      < from: TTE Echo Complete w/o Contrast w/ Doppler (06.27.20 @ 08:58) >   Impression   Summary   The mid to apical anterolateral to anterior segments are severely   hypokinetic. The apical inferoseptal wall appears hypokinetic.   Estimated left ventricular ejection fraction is 45-50 %.   A catheter wire is seen in the right atrium.   Mild (1+) tricuspid valve regurgitation is present.   Mild pulmonary hypertension.  < end of copied text >      < from: Xray Ankle Complete 3 Views, Bilateral (07.09.20 @ 16:42) >  EXAM:  XR ANKLE COMP MIN 3 VIEWS BI                        *** ADDENDUM 07/10/2020  ***                                                           Addendum:    The posterior soft tissue gas of the left ankle may be related to skin ulcerationor early fistulous tract formation. Clinical correlation is suggested.  *** END OF ADDENDUM 07/10/2020  ***  PROCEDURE DATE:  07/09/2020    INTERPRETATION:  Bilateral ankles  HISTORY: Bilateral ulcers    Three views of the right ankle and three views of the left ankle show no evidence of fracture nor destructive change. The joint spaces are maintained.  Subcutaneous soft tissue gas is present behind the left ankle.  IMPRESSION: Soft tissue gas as noted.  Thank you for this referral  ***Please see the addendum at the top of this report. It may contain additional important information or changes.****  < end of copied text >

## 2020-07-28 NOTE — PROGRESS NOTE ADULT - ASSESSMENT
Assessment and Plan:  40 yo female with a pmh/o lupus, RA, Raynaud, epilepsy (last seizure 8 yrs ago), IVDA (clean for 4 years), who presented to ED today due to weakness. found to have MRSA bacteremia, NATALIA, transaminitis, metabolic lactic acidosis,  respiratory failure requiring intubation now s/p trach (7/13), septic shock 2/2 MRSA bacteremia and Fungemia, treated. The patient was on trach collar and was transferred to floor awaiting placement but on 7/22 pm redeveloped acute respiratory failure, placed back on vent with fever. now back on trach collar and transferred to the floor.    1.	acute respiratory failure w/ hypoxia s/p tracheostomy 7/13, s/p PEG tube 7/16  2.	loculated left pleural effusion  3.	s/p MRSA bacteremia  4.	s/p Fungemia  5.	prob asiration pneumonia w/ GNR  6.	multiple ulcerations of b/l UE and LE. necrotic 5th toe  7.	anxiety d/o  8.	weight loss  9.	anemia, normocytic   10.	Lupus/ RA/ Raynaud  11.	NATALIA, resolved   12.	s/p sepsis. POA. now resolved    ·	on cefepime, for left lower lobe infiltrate on CT of chest., sputum culture negative so far, larger L effusion  ·	Continue TC   ·	oob to chair  ·	Lovenox - dvt prophylaxis  ·	continue physical therapy and tube feeds  ·	rehab placement when cleared by ID  ·	no intervention planned by CTS. if spikes fever, may have to do thora by IR  ·	supportive care/ wound care. follow podiatry recommendations    Advance directives/GOC/Code status: full  Dispo: inpatient. rehab placement.

## 2020-07-28 NOTE — PROGRESS NOTE ADULT - ASSESSMENT
Assessment: 40 y/o female seen by podiatry at bedside in the ICU for the followin.) Full Thickness Fibronecrotic Ulcer of Right fifth digit  2.) Full Thickness Fibronecrotic Ulcer with exposed peroneal tendon, lateral aspect RLE  3.) Full thickness ulcer with exposed Achilles tendon LT  4.) Full thickness ulcer to lateral malleolus, LLE  5) Full thickness ulceration, dorsum of left 1st MTPJ  6) Partial Thickness ulcer 4th interspace, Lt foot  7) Full thickness ulceration, upper 1/3 aspect of left leg at level of fibular notch  8) Non gradable Eschar of 3rd digit, Lt foot  9) Multiple hyperpigmented scar-like lesions scattered all over LE, bl  10) Serous blister, medial malleolus L side  11) Full thickness ulceration, left foot plantar sub metatarsal 5 region  12) Pain in B/L Lower Extremities     Plan:  - Chart reviewed and patient evaluated by Podiatry team.  - X-rays of the b/l LEs reviewed at this time. Case discussed with radiologist in depth. Please note oval radiolucencies visualized in the b/l LEs X-rays are due to air in exposed open wounds.   - Final wound Cx. left 1st MTPJ full-thickness ulceration, reviewed  - All wounds to b/l LEs irrigated with copious amounts of sterile saline.  - SSD and adaptic applied over the right dorsolateral forefoot ulceration.   - Alleyvn pad and SSD applied over exposed peroneal tendons to RLE.   - Adaptic applied over exposed Achilles tendon of LLE.   - Silvadene applied to wounds of L 3rd digit and L lateral malleolus ulceration.  - Silvadene applied to full-thickness ulceration left foot submet 5 region.   - The b/l feet and ankles were wrapped with 4x4 gauze, ABD pad and kerlix, then secured with tape.   - Patient's right and left heels to be offloaded in b/l Z-flex boots to be worn at all times when patient bedbound.  - Vascular Consult, appreciated.   - Discussed case with ID department given purulence exuded from full thickness ulceration to left 1st MPJ and that deep wound cx was taken, Abx per ID appreciated. Antibiotic regimen per ID appreciated.  - Discussed with CCU doctor the case, and recommendations, appreciated. Discussed case with Medicine department as well now that patient on Med/Surg inpatient unit.  - Discussed findings of full-thickness ulceration at level of left fibular notch with Vascular surgery and Wound care nursing departments. Local wound care of full-thickness ulceration at level of left fibular notch per Wound care nursing department in addition to care of patient's sacral ulceration, appreciated.  - No podiatric surgical intervention necessary at this time as no abscess collections found to b/l lower extremities   - At this time, podiatry will provide supportive, and noninvasive wound care to the wounds, b/l lower extremities, given the fact that the patient is deemed in non stable medically and at risk for acute decompensation.  - When patient is deemed stable for discharge by all other medical specialties, patient instructed to f/u at the [facility] for continued podiatric care. Daily local wound care instructions outlined below.   - Patient to be discharged with b/l surgical shoes for ambulation upon future discharge.  - Nutrition supplementation per Dietician given albumin level decreased, appreciated.   - Will continue to check patient's chart given patient nonverbal.  - Podiatry will follow the case closely while in house.     DAILY LOCAL WOUND CARE INSTRUCTIONS  Please irrigate all wounds to b/l LEs copious amounts of sterile saline.  - Apply Silvadene and adaptic over the right dorsolateral forefoot ulceration.   - Apply Alleyvn pad and SSD over exposed peroneal tendons to right leg.   - Apply Adaptic over exposed Achilles tendon of Left lower extremity.   - Apply Silvadene to wounds of Left 3rd digit and Left lateral malleolus ulceration.  - Apply Silvadene to the full-thickness ulceration to the bottom of the left foot underneath the left fifth toe.  - Lastly, wrap the bilateral lower extremities with 4x4 gauze, ABD pads and kerlix and then secure with tape.   - Please have patient ambulating in bilateral surgical shoes at all times during ambulation.

## 2020-07-29 PROCEDURE — 99232 SBSQ HOSP IP/OBS MODERATE 35: CPT

## 2020-07-29 PROCEDURE — 99233 SBSQ HOSP IP/OBS HIGH 50: CPT

## 2020-07-29 RX ADMIN — Medication 1 APPLICATION(S): at 09:35

## 2020-07-29 RX ADMIN — Medication 100 MILLIGRAM(S): at 09:37

## 2020-07-29 RX ADMIN — Medication 0.5 MILLIGRAM(S): at 05:22

## 2020-07-29 RX ADMIN — Medication 0.1 MILLIGRAM(S): at 23:06

## 2020-07-29 RX ADMIN — CHLORHEXIDINE GLUCONATE 15 MILLILITER(S): 213 SOLUTION TOPICAL at 09:34

## 2020-07-29 RX ADMIN — Medication 1 APPLICATION(S): at 09:37

## 2020-07-29 RX ADMIN — Medication 650 MILLIGRAM(S): at 23:35

## 2020-07-29 RX ADMIN — CEFEPIME 100 MILLIGRAM(S): 1 INJECTION, POWDER, FOR SOLUTION INTRAMUSCULAR; INTRAVENOUS at 17:51

## 2020-07-29 RX ADMIN — Medication 0.1 MILLIGRAM(S): at 13:56

## 2020-07-29 RX ADMIN — OXYCODONE HYDROCHLORIDE 5 MILLIGRAM(S): 5 TABLET ORAL at 16:40

## 2020-07-29 RX ADMIN — Medication 5 MILLIGRAM(S): at 23:07

## 2020-07-29 RX ADMIN — Medication 1 TABLET(S): at 09:37

## 2020-07-29 RX ADMIN — ENOXAPARIN SODIUM 40 MILLIGRAM(S): 100 INJECTION SUBCUTANEOUS at 09:35

## 2020-07-29 RX ADMIN — Medication 50 MILLIGRAM(S): at 13:55

## 2020-07-29 RX ADMIN — CEFEPIME 100 MILLIGRAM(S): 1 INJECTION, POWDER, FOR SOLUTION INTRAMUSCULAR; INTRAVENOUS at 05:23

## 2020-07-29 RX ADMIN — Medication 2 MILLIGRAM(S): at 09:38

## 2020-07-29 RX ADMIN — CHLORHEXIDINE GLUCONATE 15 MILLILITER(S): 213 SOLUTION TOPICAL at 23:09

## 2020-07-29 RX ADMIN — FAMOTIDINE 20 MILLIGRAM(S): 10 INJECTION INTRAVENOUS at 23:08

## 2020-07-29 RX ADMIN — Medication 50 MILLIGRAM(S): at 04:28

## 2020-07-29 RX ADMIN — Medication 100 MILLIGRAM(S): at 23:08

## 2020-07-29 RX ADMIN — Medication 1 MILLIGRAM(S): at 09:35

## 2020-07-29 RX ADMIN — Medication 0.1 MILLIGRAM(S): at 05:21

## 2020-07-29 RX ADMIN — Medication 1 APPLICATION(S): at 09:38

## 2020-07-29 RX ADMIN — FAMOTIDINE 20 MILLIGRAM(S): 10 INJECTION INTRAVENOUS at 09:35

## 2020-07-29 RX ADMIN — Medication 500 MILLIGRAM(S): at 09:33

## 2020-07-29 RX ADMIN — OXYCODONE HYDROCHLORIDE 5 MILLIGRAM(S): 5 TABLET ORAL at 23:07

## 2020-07-29 RX ADMIN — Medication 0.5 MILLIGRAM(S): at 17:54

## 2020-07-29 NOTE — PROGRESS NOTE ADULT - SUBJECTIVE AND OBJECTIVE BOX
Subjective:  No acute events.  Patient afebrile, tolerating tracheostomy collar.    Vital Signs:  Vital Signs Last 24 Hrs  T(C): 36.9 (07-29-20 @ 04:58), Max: 37.1 (07-28-20 @ 09:03)  T(F): 98.5 (07-29-20 @ 04:58), Max: 98.8 (07-28-20 @ 09:03)  HR: 102 (07-29-20 @ 04:58) (95 - 102)  BP: 120/83 (07-29-20 @ 04:58) (110/79 - 120/83)  RR: 18 (07-29-20 @ 04:58) (13 - 18)  SpO2: 100% (07-29-20 @ 04:58) (97% - 100%) on trach collar      Relevant labs, radiology and Medications reviewed      MEDICATIONS  (STANDING):  ALPRAZolam 0.5 milliGRAM(s) Oral two times a day  ascorbic acid 500 milliGRAM(s) Oral daily  cefepime   IVPB 2000 milliGRAM(s) IV Intermittent every 12 hours  cefepime   IVPB      chlorhexidine 0.12% Liquid 15 milliLiter(s) Oral Mucosa every 12 hours  cloNIDine 0.1 milliGRAM(s) Oral every 8 hours  collagenase Ointment 1 Application(s) Topical daily  Dakins Solution - 1/4 Strength 1 Application(s) Topical daily  doxycycline hyclate Capsule 100 milliGRAM(s) Oral every 12 hours  enoxaparin Injectable 40 milliGRAM(s) SubCutaneous daily  famotidine    Tablet 20 milliGRAM(s) Oral two times a day  folic acid 1 milliGRAM(s) Oral daily  melatonin 5 milliGRAM(s) Oral at bedtime  multivitamin 1 Tablet(s) Oral daily  silver sulfADIAZINE 1% Cream 1 Application(s) Topical daily  thiamine 100 milliGRAM(s) Oral daily    MEDICATIONS  (PRN):  acetaminophen   Tablet .. 650 milliGRAM(s) Oral every 4 hours PRN Temp greater or equal to 38C (100.4F), Mild Pain (1 - 3)  glucagon  Injectable 1 milliGRAM(s) IntraMuscular once PRN Glucose <70 milliGRAM(s)/deciLiter  hydrOXYzine hydrochloride 50 milliGRAM(s) Oral every 4 hours PRN Anxiety  loperamide 2 milliGRAM(s) Oral every 4 hours PRN Loose stool  oxyCODONE    IR 5 milliGRAM(s) Oral every 6 hours PRN Moderate Pain (4 - 6)      Physical exam  Gen: NAD breathing comfortably  Neuro: AO X 3   Card: S1 S2  Pulm: CTA - trach in place  Abd: soft NT ND - PEG in place 4.5 cm at bumper  Ext: swelling in all extremities - better      I&O's Summary    28 Jul 2020 07:01  -  29 Jul 2020 07:00  --------------------------------------------------------  IN: 1792 mL / OUT: 1601 mL / NET: 191 mL        Assessment  39y Female  w/ PAST MEDICAL & SURGICAL HISTORY:  Smoker  Heroin abuse  H/O Raynaud's syndrome  Rheumatoid arthritis  Lupus  Gall bladder stones  H/O appendicitis  H/O tubal ligation  Patient is a 39y old  Female who presents with a chief complaint of Cellulitis with sepsis, symptomatic anemia. (28 Jul 2020 14:59)    39 Female h/o lupus, RA, epilepsy (last seizure 8 yrs ago), chronic pain, IVDA admitted w weakness, failure to thrive. Found to have acute anemia, NATALIA, transaminitis, metabolic lactic acidosis, severe dehydration, severe protein calorie malnutrition. Severe sepsis with septic shock secondary to MRSA bacteremia and UTI treated with a two-week course of Vancomycin, developed worsening respiratory failure and was ultimately intubated on 6/27/20.  s/p tracheostomy 7/13/20 currently being treated for fungemia. PEG placed on 7/16/20.  Transferred to ICU on 7/22 w respiratory distress - loculated left pleural effusion, now on TC.  Transferred to [facility].    PEG care.  TF to goal.  Continue TC as tolerated.  No intervention planned for pleural effusion at this time - if patient continues to spike fevers can request CT guided thoracentesis by IR since patient will be unable to sit up for ultrasound guided procedure.	  Patient remains afebrile, cultures are negative to date.    Discussed at CTS rounds.

## 2020-07-29 NOTE — SWALLOW BEDSIDE ASSESSMENT ADULT - SWALLOW EVAL: RECOMMENDED FEEDING/EATING TECHNIQUES
allow for swallow between intakes/check mouth frequently for oral residue/pocketing/crush medication (when feasible)/hard swallow w/ each bite or sip/maintain upright posture during/after eating for 30 mins/no straws/alternate food with liquid/small sips/bites/position upright (90 degrees)/oral hygiene

## 2020-07-29 NOTE — SWALLOW BEDSIDE ASSESSMENT ADULT - SLP GENERAL OBSERVATIONS
pt semi reclining in bed; awkae and alert, interactive eye gaze: mouthing words.  WITH CUFF DEFLATED, PT IS ABLE TO MAKE A HOARSE AND INTERMITTENT VOICE..  pt was boosted in bed, Cuff was deflated by RTs, and Pt was suctioned for minimal retrieval of secretions.  pt able to cough to command with scant retrieval.

## 2020-07-29 NOTE — SWALLOW BEDSIDE ASSESSMENT ADULT - NS SPL SWALLOW CLINIC TRIAL FT
pt has +capacity to start po, but only at level ofpuree and hney thick liquids, the test medium.  Start tomorrow morning if there are no adverse effects from the testing today.  Maintain cuff deflated, and cuff MUST be deflated for eating.  if red appears form trache stoma, it will likely be residue from testing, not blood. but be aware!!  Encourage voicing, by GENTLY occluding the trache stoma.  Disc with MD:  about eventual decannulation.  She will ask Pulmonary.   Disc with NSg.   Service will follow.

## 2020-07-29 NOTE — PROGRESS NOTE ADULT - SUBJECTIVE AND OBJECTIVE BOX
CC/reason for follow up: sepsis, resp failure    S: pt is anxious. says she couldn't breathe 3 times this morning. pt had significant secretions and required suctioning several times. symptoms improved after suctioning. light yellow/clear secretions were removed from trach     ROS: no chest pain, +dyspnea that resolved w/ suctioning    T(C): 37.9 (07-29-20 @ 20:00), Max: 37.9 (07-29-20 @ 20:00)  HR: 104 (07-29-20 @ 20:00) (93 - 104)  BP: 138/89 (07-29-20 @ 20:00) (119/86 - 138/89)  RR: 18 (07-29-20 @ 20:00) (13 - 18)  SpO2: 99% (07-29-20 @ 20:00) (99% - 100%)    Gen - chronically ill appearing, cooperative, in no acute distress  HEENT- PERRL, moist mucus membranes, OP clear. poor and partial dentition   CV - RRR, No m/r/g, +pulses, +edema of b/l hands  Lungs - Good effort, diminished BS in the bases bilaterally. trach collar intact  Abdomen - Soft, nontender, nondistended, +BS, No rebound/rigidity/guarding  Ext - No cyanosis/clubbing.   Skin - multiple ulcers/wounds at different stages throughout body/extremities. No jaundice. skin breakdown at trach site  Psych- Alert & oriented   Neuro- no facial droop, EOMI. moves all ext    MEDICATIONS  (STANDING):  ALPRAZolam 0.5 milliGRAM(s) Oral two times a day  ascorbic acid 500 milliGRAM(s) Oral daily  cefepime   IVPB 2000 milliGRAM(s) IV Intermittent every 12 hours  cefepime   IVPB      chlorhexidine 0.12% Liquid 15 milliLiter(s) Oral Mucosa every 12 hours  cloNIDine 0.1 milliGRAM(s) Oral every 8 hours  collagenase Ointment 1 Application(s) Topical daily  Dakins Solution - 1/4 Strength 1 Application(s) Topical daily  doxycycline hyclate Capsule 100 milliGRAM(s) Oral every 12 hours  enoxaparin Injectable 40 milliGRAM(s) SubCutaneous daily  famotidine    Tablet 20 milliGRAM(s) Oral two times a day  folic acid 1 milliGRAM(s) Oral daily  melatonin 5 milliGRAM(s) Oral at bedtime  multivitamin 1 Tablet(s) Oral daily  silver sulfADIAZINE 1% Cream 1 Application(s) Topical daily  thiamine 100 milliGRAM(s) Oral daily    MEDICATIONS  (PRN):  acetaminophen   Tablet .. 650 milliGRAM(s) Oral every 4 hours PRN Temp greater or equal to 38C (100.4F), Mild Pain (1 - 3)  glucagon  Injectable 1 milliGRAM(s) IntraMuscular once PRN Glucose <70 milliGRAM(s)/deciLiter  hydrOXYzine hydrochloride 50 milliGRAM(s) Oral every 4 hours PRN Anxiety  loperamide 2 milliGRAM(s) Oral every 4 hours PRN Loose stool  oxyCODONE    IR 5 milliGRAM(s) Oral every 6 hours PRN Moderate Pain (4 - 6)      Diagnostic studies: personally reviewed  LABS: All Labs Reviewed:      Blood Culture: 07-23 @ 15:55  Organism --  Gram Stain Blood -- Gram Stain --  Specimen Source .Urine Clean Catch (Midstream)  Culture-Blood --      RADIOLOGY/EKG:    < from: CT Abdomen and Pelvis w/ Oral Cont (07.23.20 @ 11:42) >  IMPRESSION:   1.  Improved RIGHT lower lobe and RIGHT middle lobe pneumonia. Residual nodular opacities likely represent residual infection. Slightly increased RIGHT pleural effusion since the most recent prior study. Marked increase in LEFT pleural effusion since the prior study with new complete atelectasis/consolidation of the LEFT lower lobe.  2.  Increasing pericardial effusion.  3.  No evidence of bowel obstruction. PEG tube in place.  4.  Thickening versus incomplete distention of the cecum with nonvisualization of the appendix.  5.  Ascites with questionable thickening of the peritoneum. Limited evaluation due to lack of IV contrast material. Correlate with signs/symptoms of peritonitis.  6.  LEFT para-aortic increased soft tissue likely represents adenopathy which is stable from most recent exam but increased from study dated March 05, 2012.    < end of copied text >    < from: GRAHAM Complete w/Spect and Color (07.08.20 @ 16:19) >   Summary     No evidence of vegetations on any of the cardiac valves, structures or   central line seen at the RA-IVC junction.   Normal LV size, thickness & systolic function. EF=60-65%.   No evidence of PFO/ASD w/ 2D imaging, color doppler or injection of   agitated saline.   Small pericardial effusion noted posteriorly.    < end of copied text >    < from: Xray Chest 1 View- PORTABLE-Routine (07.26.20 @ 10:21) >  IMPRESSION:   Persistent LEFT lower lobe airspace consolidation and/or effusion..    < end of copied text >

## 2020-07-29 NOTE — SWALLOW BEDSIDE ASSESSMENT ADULT - COMMENTS
t is a 38 yo female with a pm hx of SLE ,RA ( last seen by dr medina 1 year ago?)   ,reynards, epilepsy (last seizure 8 yrs ago), IVDA (clean for 4 years), who presented to ED today due to weakness. Pt states that her grandmother  approx. 3 months ago and for the past almost 2 months she has been snorting two bags of heroin a day. Pt states her heroin use is partially due to depression and anxiety since she passed away however also due to financial reasons as can no longer afford oxycodone which she had been taking for her lupus/RA chronic pain as Rx. Pt states she has wounds all over her body from falls, ulcers from previous attempted injection sites, which are not healing and bleeding.  Pt describes weakness as fatigue, endorses sob with exertion, weight loss due to decreased appetite from depression and drug use, palpitations.    Hospital course notable for trache placement and for PG tube placement, one failed weaning.  pt now on trache collar, with inflated cuff.  Reportedly asking to eat.  PG tube fed.  Service is asked to evaluate pt fo po intake.  Session completed wit assistance of respiratory therapists.

## 2020-07-29 NOTE — PROGRESS NOTE ADULT - ASSESSMENT
Assessment and Plan:  38 yo female with a pmh/o lupus, RA, Raynaud, epilepsy (last seizure 8 yrs ago), IVDA (clean for 4 years), who presented to ED today due to weakness. found to have MRSA bacteremia, NATALIA, transaminitis, metabolic lactic acidosis,  respiratory failure requiring intubation now s/p trach (7/13), septic shock 2/2 MRSA bacteremia and Fungemia, treated. The patient was on trach collar and was transferred to floor awaiting placement but on 7/22 pm redeveloped acute respiratory failure, placed back on vent with fever. now back on trach collar and transferred to the floor.    1.	acute respiratory failure w/ hypoxia s/p tracheostomy 7/13, s/p PEG tube 7/16   2.	loculated left pleural effusion  3.	s/p MRSA bacteremia  4.	s/p Fungemia  5.	prob asiration pneumonia w/ GNR  6.	multiple ulcerations of b/l UE and LE. necrotic 5th toe  7.	anxiety d/o  8.	weight loss  9.	anemia, normocytic   10.	Lupus/ RA/ Raynaud  11.	NATALIA, resolved   12.	s/p sepsis. POA. now resolved    ·	completing cefepime today for left lower lobe infiltrate on CT of chest., sputum culture negative so far, larger L effusion. will remain on doxycycline   ·	Continue TC -- will consult pulmonary for trach management and increased secretions. also consideration for eventual decannulation   ·	wound care consult for skin break down at trach site and mgmt of other wounds   ·	pt wanting to eat. consult speech pathology. d/w Kristin, pt is currently more stable after suctioning. RT will attempt deflating cuff and SLP will do bedside swallow eval. aspiration precautions   ·	oob to chair  ·	Lovenox - dvt prophylaxis  ·	continue physical therapy and tube feeds  ·	rehab placement -- has been accepted at [facility]  ·	no intervention planned by CTS. if spikes fever, may have to do thora by IR  ·	supportive care/ wound care. follow podiatry recommendations    Advance directives/GOC/Code status: full  Dispo: inpatient. pulm eval. speech eval. rehab placement. Assessment and Plan:  40 yo female with a pmh/o lupus, RA, Raynaud, epilepsy (last seizure 8 yrs ago), IVDA (clean for 4 years), who presented to ED today due to weakness. found to have MRSA bacteremia, NATALIA, transaminitis, metabolic lactic acidosis,  respiratory failure requiring intubation now s/p trach (7/13), septic shock 2/2 MRSA bacteremia and Fungemia, treated. The patient was on trach collar and was transferred to floor awaiting placement but on 7/22 pm redeveloped acute respiratory failure, placed back on vent with fever. now back on trach collar and transferred to the floor.    1.	acute respiratory failure w/ hypoxia s/p tracheostomy 7/13, s/p PEG tube 7/16   2.	loculated left pleural effusion  3.	s/p MRSA bacteremia  4.	s/p Fungemia  5.	prob asiration pneumonia w/ GNR  6.	multiple ulcerations of b/l UE and LE. necrotic 5th toe  7.	anxiety d/o  8.	weight loss  9.	anemia, normocytic   10.	Lupus/ RA/ Raynaud  11.	NATALIA, resolved   12.	s/p sepsis. POA. now resolved    ·	completing cefepime today for left lower lobe infiltrate on CT of chest., sputum culture negative so far, larger L effusion. will remain on doxycycline   ·	Continue TC -- will consult pulmonary for trach management and increased secretions. also consideration for eventual decannulation   ·	wound care consult for skin break down at trach site and mgmt of other wounds   ·	pt wanting to eat. consult speech pathology. d/w Kristin, pt is currently more stable after suctioning. RT will attempt deflating cuff and SLP will do bedside swallow eval. aspiration precautions   ·	oob to chair  ·	Lovenox - dvt prophylaxis  ·	continue physical therapy and tube feeds  ·	rehab placement -- has been accepted at [facility]  ·	no intervention planned by CTS. if spikes fever, may have to do thora by IR  ·	supportive care/ wound care. follow podiatry recommendations  ·	of note, pt is a difficult stick. phlebotomy unable to draw labs. recent labs reviewed. according to phlebotomist, if new labs needed, then would have to obtained by physician via femoral stick, may need ultrasound guidance.      Advance directives/GOC/Code status: full  Dispo: inpatient. pulm eval. speech eval. rehab placement.

## 2020-07-29 NOTE — PROGRESS NOTE ADULT - SUBJECTIVE AND OBJECTIVE BOX
Date of service: 7/29/2020  Chief Complaint: B/L lower extremity wounds     HPI: Pt was examined at bedside by podiatry with attending present, for B/L lower extremity ulcerative lesions. Pt is a 40 yo female who has been admitted to the CCU for septic shock, UTI with MRSA and bacteremia. Noted Pt is currently intubated and non verbal, she is alert and oriented. The patient is also being treated for anemia, acute respiratory failure and toxic metabolic encephalopathy. Of Note, Pt has a history of IVDA drug use and is critically ill and at risk for acute decompensation possible death per the intensivist. Due to the patient being intubated all additional medical history and information was gathered from the patients chart.    7/29: Patient seen and examined by podiatry for follow-up evaluation of right and left lower extremity wounds on the telemetry inpatient floor with attending present. Patient noted be arousable at bedside and patient s/p tracheostomy procedure performed on 7/13/2020 and PEG placed on 7/16/2020. Patient has hasd no overnight events or acute changes. All further HPI obtained via patient's chart.    REVIEW OF SYSTEMS:  Unable to assess due to pt's inability to communicate per intubation    Allergies:  morphine (Unknown)    Past Medical History:  Lupus, RA, Reynard's Epilepsy, IVDA     Family History:  No pertinent family history in first degree relatives: cannot recall family history at this time    Social History:  lives alone  single    MEDICATIONS  (STANDING):  ALPRAZolam 0.5 milliGRAM(s) Oral two times a day  ascorbic acid 500 milliGRAM(s) Oral daily  cefepime   IVPB 2000 milliGRAM(s) IV Intermittent every 12 hours  cefepime   IVPB      chlorhexidine 0.12% Liquid 15 milliLiter(s) Oral Mucosa every 12 hours  cloNIDine 0.1 milliGRAM(s) Oral every 8 hours  collagenase Ointment 1 Application(s) Topical daily  Dakins Solution - 1/4 Strength 1 Application(s) Topical daily  doxycycline hyclate Capsule 100 milliGRAM(s) Oral every 12 hours  enoxaparin Injectable 40 milliGRAM(s) SubCutaneous daily  famotidine    Tablet 20 milliGRAM(s) Oral two times a day  folic acid 1 milliGRAM(s) Oral daily  melatonin 5 milliGRAM(s) Oral at bedtime  multivitamin 1 Tablet(s) Oral daily  silver sulfADIAZINE 1% Cream 1 Application(s) Topical daily  thiamine 100 milliGRAM(s) Oral daily    MEDICATIONS  (PRN):  acetaminophen   Tablet .. 650 milliGRAM(s) Oral every 4 hours PRN Temp greater or equal to 38C (100.4F), Mild Pain (1 - 3)  glucagon  Injectable 1 milliGRAM(s) IntraMuscular once PRN Glucose <70 milliGRAM(s)/deciLiter  hydrOXYzine hydrochloride 50 milliGRAM(s) Oral every 4 hours PRN Anxiety  loperamide 2 milliGRAM(s) Oral every 4 hours PRN Loose stool  oxyCODONE    IR 5 milliGRAM(s) Oral every 6 hours PRN Moderate Pain (4 - 6)    Vital Signs Last 24 Hrs  T(C): 37.1 (29 Jul 2020 10:06), Max: 37.1 (28 Jul 2020 20:05)  T(F): 98.7 (29 Jul 2020 10:06), Max: 98.8 (28 Jul 2020 20:05)  HR: 93 (29 Jul 2020 10:06) (93 - 102)  BP: 119/86 (29 Jul 2020 10:06) (110/79 - 120/83)  BP(mean): --  RR: 18 (29 Jul 2020 10:06) (13 - 18)  SpO2: 100% (29 Jul 2020 10:06) (97% - 100%)    Physical examination:  Constitutional: Pt intubated, lying in bed    Focused LE examination:   Vasc: DP and PT pulses non palpable b/l, CFT < 5 x 10, TG: warm to warm  Neuro and MSK: Unable to assess due to patient's condition    Derm:  Rt LE  - Full thickness ulcer with exposed peroneal tendons on lateral aspect of distal RLE that + probes to Fibula, no drainage, + undermining all around the clock, + tenderness. Negative crepitus or fluctuance to area of lateral aspect of the distal RLE.  - Rt 5th toe demonstrates a full thickness with less fibronecotic wound bed and evidence of increasing granulation tissue compared to the prior examination, measuring approximately 7.2 x 4.6 x 0.8 cm, - purulence, - malodor, - fluctuance +Probe to 5th metatarsal head, EDL tendon exposed and + undermining from the medial aspect, + tunneling to the 9 hour.     Lt LE:  -Full thickness ulcer round in shape and approx  0.5 cm distal to lateral malleolus, tunneling to 3 and 6 o'clock positions, + undermining all around, - drainage, - fluctuance - malodor, + probe to bone to the lateral malleolus, - purulence; negative crepitus and - fluctuance  - Multiple Full thickness ulcerations with exposed Achilles tendon noted to the posterior surface of the left distal 1/3 aspect of the left leg.  -Swelling and mild edema noted over the medial malleolus of LLE. Intact serous blister noted to the medial malleolar region. Edema noted to the dorsal-lateral aspect of the left fifth MPJ, noted to be mostly likely pressure induced; area of black-blue discoloration noted to the the left fifth sub met 5 region.   -Partial thickness ulcer, 4th interdigital space with presence of dry blood, - probe to bone, - undermining - tunneling  - Non gradable eschar on 3rd digit on Lt foot  - Full thickness ulceration noted to the dorsum of the left 1st MPJ. Positive probing to the left 1st MPJ. Positive purulence exuded from the wound with milking of the left foot.  - Full thickness ulceration noted to the lateral aspect of the upper 1/3 aspect of the leg at the level of the fibular notch. + tendon exposed; no drainage with palpation of the periwound area; no malodor exuded from the wound, negative fluctuance and negative crepitus to the area. Full thickness ulceration L fibular notch area noted to be covered with Alleyvn pad.   - Full thickness ulceration noted to plantar aspect of left foot sub metatarsal 5 region. + Serous drainage noted with milking of the left foot. - fluctuance or crepitus felt to the sub met 5 left foot. No malodor exuded from drainage to the left foot. - probe to bone, - undermining and - tunneling.       Labs:  Complete Blood Count + Automated Diff (07.23.20 @ 12:20)    WBC Count: 9.06 K/uL    RBC Count: 2.59 M/uL    Hemoglobin: 7.5 g/dL    Hematocrit: 24.4 %    Mean Cell Volume: 94.2 fl    Mean Cell Hemoglobin: 29.0 pg    Mean Cell Hemoglobin Conc: 30.7 gm/dL    Red Cell Distrib Width: 15.1 %    Platelet Count - Automated: 480 K/uL    Auto Neutrophil #: 7.58 K/uL    Auto Lymphocyte #: 0.75 K/uL    Auto Monocyte #: 0.46 K/uL    Auto Eosinophil #: 0.13 K/uL    Auto Basophil #: 0.04 K/uL    Auto Neutrophil %: 83.7: Differential percentages must be correlated with absolute numbers for  clinical significance. %    Auto Lymphocyte %: 8.3 %    Auto Monocyte %: 5.1 %    Auto Eosinophil %: 1.4 %    Auto Basophil %: 0.4 %    Auto Immature Granulocyte %: 1.1 %    Basic Metabolic Panel (07.26.20 @ 09:10)    Sodium, Serum: 138 mmol/L    Potassium, Serum: 4.4 mmol/L    Chloride, Serum: 106 mmol/L    Carbon Dioxide, Serum: 23 mmol/L    Anion Gap, Serum: 9 mmol/L    Blood Urea Nitrogen, Serum: 34 mg/dL    Creatinine, Serum: 0.79 mg/dL    Glucose, Serum: 99 mg/dL    Calcium, Total Serum: 7.9 mg/dL    eGFR if Non : 95: Interpretative comment  The units for eGFR are mL/min/1.73M2 (normalized body surface area). The  eGFR is calculated from a serum creatinine using the CKD-EPI equation.  Other variables required for calculation are race, age and sex. Among  patients with chronic kidney disease (CKD), the eGFR is useful in  determining the stage of disease according to KDOQI CKD classification.  All eGFR results are reported numerically with the following  interpretation.          GFR                    With                 Without     (ml/min/1.73 m2)    Kidney Damage       Kidney Damage        >= 90                    Stage 1                     Normal        60-89                    Stage 2                     Decreased GFR        30-59     Stage 3                     Stage 3        15-29                    Stage 4                     Stage 4        < 15                      Stage 5                     Stage 5  Each stage of CKD assumes that the associated GFR level has been in  effect for at least 3 months. Determination of stages one and two (with  eGFR > 59 ml/min/m2) requires estimation of kidney damage for at least 3  months as defined by structural or functional abnormalities.  Limitations: All estimates of GFR will be less accurate for patients at  extremes of muscle mass (including but not limited to frail elderly,  critically ill, or cancer patients), those with unusual diets, and those  with conditions associated with reduced secretion or extrarenal  elimination of creatinine. The eGFR equation is not recommended for use  in patients with unstable creatinine levels. mL/min/1.73M2    eGFR if African American: 110 mL/min/1.73M2           Culture - Blood in AM (07.06.20 @ 14:30)    Gram Stain:   Growth in aerobic bottle: Yeast like cells    -  5-Flucytosine: N/I For Candida species, no interpretation available for any invitro susceptibility testing.    -  Amphotericin B: N/I 0.25    -  Andulafungin: N/I 0.12    -  Caspofungin: N/I 0.06 CASPOFUNGIN: is indicated in the treatment of candidemia, intra-abdominal abscess, peritonitis, pleural space infections and esophageal candidiasis.    -  Fluconazole: N/I <=0.12 Fluconazole = Data established for mucosal disease, and is generally applicable for invasive disease.  Susceptibility is dependent on achieving the maximal possible blood level.  Doses of 400 MG/day or more may be required in adults with normal renalfunction and body habitus.    -  Itraconazole: N/I For Candida species, no interpretation available for any invitro susceptibility testing.    -  Micafungin: N/I 0.015    -  Posaconazole: N/I 0.015    -  Voriconazole: N/I <=0.008 VORICONAZOLE: The KRYSTEN has been determined by the colormetric microdilution method.    -  Multidrug (KPC pos) resistant organism: Nondet    -  Staphylococcus aureus: Nondet    -  Methicillin resistant Staphylococcus aureus (MRSA): Nondet    -  Coagulase negative Staphylococcus: Nondet    -  Enterococcus species: Nondet    -  Vancomycin resistant Enterococcus sp.: Nondet    -  Escherichia coli: Nondet    -  Klebsiella oxytoca: Nondet    -  Klebsiella pneumoniae: Nondet    -  Serratia marcescens: Nondet    -  Haemophilus influenzae: Nondet    -  Listeria monocytogenes: Nondet    -  Neisseria meningitidis: Nondet    -  Pseudomonas aeruginosa: Nondet    -  Acinetobacter baumanii: Nondet    -  Enterobacter cloacae complex: Nondet    -  Streptococcus sp. (Not Grp A, B or S pneumoniae): Nondet    -  Streptococcus agalactiae (Group B): Nondet    -  Streptococcus pyogenes (Group A): Nondet    -  Streptococcus pneumoniae: Nondet    -  Candida albicans: Nondet    -  Candida glabrata: Nondet    -  Candida krusei: Nondet    -  Candida parapsilosis: Nondet    -  Candida tropicalis: Nondet    -  Interpretation: See note This test method is not approved by the FDA and is for research use only.  The performance characteristics of this assay may have been determined by [employer] and [facility], [place], [place]                            N/I - No interpretation available                                                         SDD – Sensitive Dose Dependent    Specimen Source: .Blood Blood-Peripheral    Organism: Blood Culture PCR    Organism: Brittny dubliniensis    Culture Results:   Growth in aerobic bottle: Brittny dubliniensis  "Due to technical problems, Proteus sp. will Not be reported as part of  the BCID panel until further notice"  ***Blood Panel PCR results on this specimen are available  approximately 3 hours after the Gram stain result.***  Gram stain, PCR, and/or culture results may not always  correspond due to difference in methodologies.  ************************************************************  This PCR assay was performed using [employer].  The following targets are tested for: Enterococcus,  vancomycin resistant enterococci, Listeria monocytogenes,  coagulase negative staphylococci, S. aureus,  methicillin resistant S. aureus, Streptococcus agalactiae  (Group B), S. pneumoniae, S. pyogenes (Group A),  Acinetobacter baumannii, Enterobacter cloacae, E. coli,  Klebsiella oxytoca, K. pneumoniae, Proteus sp.,  Serratia marcescens, Haemophilus influenzae,  Neisseria meningitidis, Pseudomonas aeruginosa, Candida  albicans, C. glabrata, C krusei, C parapsilosis,  C. tropicalis and the KPC resistance gene.    Organism Identification: Blood Culture PCR  Candida dubliniensis    Method Type: PCR    Method Type: YSIC      Imaging:    < from: US Duplex Arterial Lower Ext Compl, Bilateral (07.14.20 @ 14:49) >  IMPRESSION:  No evidence of a hemodynamically significant stenosis or occlusion in the visualized lower extremity arteries.  Subcutaneous edema throughout both lower extremities with fluid collections in both groins.  Monophasic waveforms throughout both lower extremities.  < end of copied text >      < from: TTE Echo Complete w/o Contrast w/ Doppler (06.27.20 @ 08:58) >   Impression   Summary   The mid to apical anterolateral to anterior segments are severely   hypokinetic. The apical inferoseptal wall appears hypokinetic.   Estimated left ventricular ejection fraction is 45-50 %.   A catheter wire is seen in the right atrium.   Mild (1+) tricuspid valve regurgitation is present.   Mild pulmonary hypertension.  < end of copied text >      < from: Xray Ankle Complete 3 Views, Bilateral (07.09.20 @ 16:42) >  EXAM:  XR ANKLE COMP MIN 3 VIEWS BI                        *** ADDENDUM 07/10/2020  ***                                                           Addendum:    The posterior soft tissue gas of the left ankle may be related to skin ulcerationor early fistulous tract formation. Clinical correlation is suggested.  *** END OF ADDENDUM 07/10/2020  ***  PROCEDURE DATE:  07/09/2020    INTERPRETATION:  Bilateral ankles  HISTORY: Bilateral ulcers    Three views of the right ankle and three views of the left ankle show no evidence of fracture nor destructive change. The joint spaces are maintained.  Subcutaneous soft tissue gas is present behind the left ankle.  IMPRESSION: Soft tissue gas as noted.  Thank you for this referral  ***Please see the addendum at the top of this report. It may contain additional important information or changes.****  < end of copied text >

## 2020-07-29 NOTE — PROGRESS NOTE ADULT - ASSESSMENT
38 y/o female with h/o SLE, RA, Reynauld syndrome, epilepsy (last seizure 8 yrs ago), IVDA (heroin) was admitted on  for increased weakness. Pt states that her grandmother  approx. 3 months ago and for the past almost 2 months she has been snorting two bags of heroin a day. Pt states her heroin use is partially due to depression and anxiety since gma passed away however also due to financial reasons as can no longer afford oxycodone which she had been taking for her lupus/RA chronic pain as Rx. Pt states she has wounds all over her body from falls, ulcers from previous attempted injection sites, which are not healing and bleed at times. Pt describes weakness as fatigue, endorses sob with exertion, weight loss due to decreased appetite, palpitations. Denies fever, cough. In ER she was found with low grade fever and received vancomycin IV and zosyn.     1. Febrile syndrome resolved. Possible aspiration pneumonia with GNR. Right foot ulcer with necrotic 5th toe with probable underlying infection/OM. s/p sepsis with MRSA. Multiple skin ulcers. Soft tissue portal vein/ liver mass ?cause. Chronic renal failure. Chronic respiratory failure due to deconditioning.  -new fever  -leukocytosis improving  -BC reviewed  -repeat BC could not be obtained so far; to try again  -s/p vancomycin # 14  and fluconazole # 14 days  -on doxycycline 100 mg PO q12h # 17  -tolerating abx well so far; no side effects noted  -on cefepime 2 gm IV q12h # 7  -complete cefepime today and continue doxycyline PO for 6 weeks  -monitor temps  -supportive care  2. Other issues:   -care per medicine

## 2020-07-29 NOTE — SWALLOW BEDSIDE ASSESSMENT ADULT - SWALLOW EVAL: CRITERIA FOR SKILLED INTERVENTION MET
will follow for tolerance and for eventual upgrade./demonstrates skilled criteria for swallowing intervention

## 2020-07-29 NOTE — PROGRESS NOTE ADULT - ASSESSMENT
Assessment: 38 y/o female seen by podiatry at bedside in the ICU for the followin.) Full Thickness Fibronecrotic Ulcer of Right fifth digit  2.) Full Thickness Fibronecrotic Ulcer with exposed peroneal tendon, lateral aspect RLE  3.) Full thickness ulcer with exposed Achilles tendon LT  4.) Full thickness ulcer to lateral malleolus, LLE  5) Full thickness ulceration, dorsum of left 1st MTPJ  6) Partial Thickness ulcer 4th interspace, Lt foot  7) Full thickness ulceration, upper 1/3 aspect of left leg at level of fibular notch  8) Non gradable Eschar of 3rd digit, Lt foot  9) Multiple hyperpigmented scar-like lesions scattered all over LE, bl  10) Serous blister, medial malleolus L side  11) Full thickness ulceration, left foot plantar sub metatarsal 5 region  12) Pain in B/L Lower Extremities     Plan:  - Chart reviewed and patient evaluated by Podiatry team with attending present.  - X-rays of the b/l LEs reviewed at this time. Case discussed with radiologist in depth. Please note oval radiolucencies visualized in the b/l LEs X-rays are due to air in exposed open wounds.   - Final wound Cx. left 1st MTPJ full-thickness ulceration, reviewed  - All wounds to b/l LEs irrigated with copious amounts of sterile saline.  - SSD and adaptic applied over the right dorsolateral forefoot ulceration.   - Alleyvn pad and SSD applied over exposed peroneal tendons to RLE.   - Adaptic applied over exposed Achilles tendon of LLE.   - Silvadene applied to wounds of L 3rd digit and L lateral malleolus ulceration.  - Silvadene applied to full-thickness ulceration left foot submet 5 region.   - The b/l feet and ankles were wrapped with 4x4 gauze, ABD pad and kerlix, then secured with tape.   - Patient's right and left heels to be offloaded in b/l Z-flex boots to be worn at all times when patient bedbound.  - Vascular Consult, appreciated.   - Discussed case with ID department given purulence exuded from full thickness ulceration to left 1st MPJ and that deep wound cx was taken, Abx per ID appreciated. Antibiotic regimen per ID appreciated.  - Discussed case with Medicine department as well now that patient on Med/Surg inpatient unit.  - Discussed findings of full-thickness ulceration at level of left fibular notch with Vascular surgery and Wound care nursing departments. Local wound care of full-thickness ulceration at level of left fibular notch per Wound care nursing department in addition to care of patient's sacral ulceration, appreciated.  - No podiatric surgical intervention necessary at this time as no abscess collections found to b/l lower extremities   - At this time, podiatry will provide supportive, and noninvasive wound care to the wounds, b/l lower extremities, given the fact that the patient is deemed in non stable medically and at risk for acute decompensation.  - When patient is deemed stable for discharge by all other medical specialties, patient instructed to f/u at the [facility] for continued podiatric care. Daily local wound care instructions outlined below.   - Patient to be discharged with b/l surgical shoes for ambulation upon future discharge.  - Nutrition supplementation per Dietician given albumin level decreased, appreciated.   - Will continue to check patient's chart given patient nonverbal.  - Podiatry will follow the case closely while in house.     DAILY LOCAL WOUND CARE INSTRUCTIONS  Please irrigate all wounds to b/l LEs copious amounts of sterile saline.  - Apply Silvadene and adaptic over the right dorsolateral forefoot ulceration.   - Apply Alleyvn pad and SSD over exposed peroneal tendons to right leg.   - Apply Adaptic over exposed Achilles tendon of Left lower extremity.   - Apply Silvadene to wounds of Left 3rd digit and Left lateral malleolus ulceration.  - Apply Silvadene to the full-thickness ulceration to the bottom of the left foot underneath the left fifth toe.  - Lastly, wrap the bilateral lower extremities with 4x4 gauze, ABD pads and kerlix and then secure with tape.   - Please have patient ambulating in bilateral surgical shoes at all times during ambulation.

## 2020-07-29 NOTE — SWALLOW BEDSIDE ASSESSMENT ADULT - SWALLOW EVAL: DIAGNOSIS
oral-pharyngeal swallow skills within functional limits to start po at level of puree and honey thick liquids.  Rx: start tomorrow if no late adverse effects from present testing. : need to ewait

## 2020-07-29 NOTE — PROGRESS NOTE ADULT - SUBJECTIVE AND OBJECTIVE BOX
Date of service: 20 @ 10:13    Lying in bed in NAD  No fever or chills  No SOB    ROS: no fever or chills; denies dizziness, no HA, no abdominal pain, no diarrhea or constipation; no dysuria, no legs pain    MEDICATIONS  (STANDING):  ALPRAZolam 0.5 milliGRAM(s) Oral two times a day  ascorbic acid 500 milliGRAM(s) Oral daily  cefepime   IVPB 2000 milliGRAM(s) IV Intermittent every 12 hours  cefepime   IVPB      chlorhexidine 0.12% Liquid 15 milliLiter(s) Oral Mucosa every 12 hours  cloNIDine 0.1 milliGRAM(s) Oral every 8 hours  collagenase Ointment 1 Application(s) Topical daily  Dakins Solution - 1/4 Strength 1 Application(s) Topical daily  doxycycline hyclate Capsule 100 milliGRAM(s) Oral every 12 hours  enoxaparin Injectable 40 milliGRAM(s) SubCutaneous daily  famotidine    Tablet 20 milliGRAM(s) Oral two times a day  folic acid 1 milliGRAM(s) Oral daily  melatonin 5 milliGRAM(s) Oral at bedtime  multivitamin 1 Tablet(s) Oral daily  silver sulfADIAZINE 1% Cream 1 Application(s) Topical daily  thiamine 100 milliGRAM(s) Oral daily    Vital Signs Last 24 Hrs  T(C): 37.1 (2020 10:06), Max: 37.1 (2020 20:05)  T(F): 98.7 (2020 10:06), Max: 98.8 (2020 20:05)  HR: 93 (2020 10:06) (93 - 102)  BP: 119/86 (2020 10:06) (110/79 - 120/83)  BP(mean): --  RR: 18 (2020 10:06) (13 - 18)  SpO2: 100% (2020 10:06) (97% - 100%)     Physical exam:    Constitutional:  No acute distress  HEENT: NC/AT, EOMI, PERRLA, conjunctivae clear  Neck: supple; thyroid not palpable  Back: no tenderness  Respiratory: respiratory effort normal; few rhonchi, decreased BS at bases  Cardiovascular: S1S2 regular, no murmurs  Abdomen: soft, not tender, not distended, positive BS  Genitourinary: no suprapubic tenderness  Lymphatic: no LN palpable  Musculoskeletal: no muscle tenderness, no joint swelling or tenderness; multiple contusions  Extremities: no pedal edema; left 5th toe ulcer with necrosis  Neurological/ Psychiatric: confused; moving all extremities  Skin: multiple skin ulcers and rashes; no drainage; right 5th MTP ulcer    Labs: reviewed                        7.5    9.06  )-----------( 480      ( 2020 12:20 )             24.4     07-23    140  |  107  |  22  ----------------------------<  88  4.1   |  25  |  0.78    Ca    7.8<L>      2020 12:20  Phos  5.4     07-23  Mg     1.3     07-23    TPro  6.3  /  Alb  1.2<L>  /  TBili  0.3  /  DBili  x   /  AST  39<H>  /  ALT  21  /  AlkPhos  120  07-23                        7.4    8.05  )-----------( 100      ( 2020 07:05 )             21.9     06-24    134<L>  |  104  |  48<H>  ----------------------------<  68<L>  4.8   |  20<L>  |  2.31<H>    Ca    7.2<L>      2020 07:05  Phos  3.3     06-24  Mg     1.1     06-24    TPro  4.8<L>  /  Alb  0.8<L>  /  TBili  1.4<H>  /  DBili  x   /  AST  73<H>  /  ALT  27  /  AlkPhos  298<H>  06-24     LIVER FUNCTIONS - ( 2020 07:05 )  Alb: 0.8 g/dL / Pro: 4.8 gm/dL / ALK PHOS: 298 U/L / ALT: 27 U/L / AST: 73 U/L / GGT: x           Urinalysis Basic - ( 2020 05:41 )    Color: Ale / Appearance: Slightly Turbid / S.010 / pH: x  Gluc: x / Ketone: Negative  / Bili: Negative / Urobili: 1 mg/dL   Blood: x / Protein: 30 mg/dL / Nitrite: Negative   Leuk Esterase: Moderate / RBC: 11-25 /HPF / WBC 3-5   Sq Epi: x / Non Sq Epi: Negative / Bacteria: Many      Culture - Sputum (collected 2020 08:29)  Source: .Sputum Sputum trap  Gram Stain (2020 14:45):    No Squamous epithelial cells per low power field    Moderate polymorphonuclear leukocytes per low power field    Moderate Yeast per oil power field  Preliminary Report (2020 10:05):    Normal Respiratory Elsa present    Culture - Urine (collected 2020 05:41)  Source: .Urine None  Final Report (2020 10:38):    >100,000 CFU/ml Methicillin resistant Staphylococcus aureus    <10,000 CFU/ml Normal Urogenital elsa present  Organism: Methicillin resistant Staphylococcus aureus (2020 10:38)  Organism: Methicillin resistant Staphylococcus aureus (2020 10:38)      -  Ampicillin/Sulbactam: R <=8/4      -  Cefazolin: R 8      -  Daptomycin: S 1      -  Gentamicin: S <=1 Should not be used as monotherapy      -  Linezolid: S 2      -  Oxacillin: R >2      -  Penicillin: R >8      -  RIF- Rifampin: S <=1 Should not be used as monotherapy      -  Tetra/Doxy: S <=1      -  Trimethoprim/Sulfamethoxazole: S <=0.5/9.5      -  Vancomycin: S 2      Method Type: KRYSTEN    Culture - Blood (collected 2020 22:22)  Source: .Blood None  Gram Stain (2020 15:07):    Growth in aerobic bottle: Gram Positive Cocci in Clusters    Growth in anaerobic bottle: Gram Positive Cocci in Clusters  Final Report (2020 10:45):    Growth in aerobic and anaerobic bottles: Methicillin resistant    Staphylococcus aureus  Organism: Blood Culture PCR  Methicillin resistant Staphylococcus aureus (2020 10:45)  Organism: Methicillin resistant Staphylococcus aureus (2020 10:45)      -  Ampicillin/Sulbactam: R <=8/4      -  Cefazolin: R 8      -  Clindamycin: R >4      -  Daptomycin: S 1      -  Erythromycin: R >4      -  Gentamicin: S <=1 Should not be used as monotherapy      -  Linezolid: S 2      -  Oxacillin: R >2      -  Penicillin: R >8      -  RIF- Rifampin: S <=1 Should not be used as monotherapy      -  Tetra/Doxy: S <=1      -  Trimethoprim/Sulfamethoxazole: S <=0.5/9.5      -  Vancomycin: S 2      Method Type: KRYSTEN  Organism: Blood Culture PCR (2020 10:45)      -  Methicillin resistant Staphylococcus aureus (MRSA): Detec      Method Type: PCR    Culture - Blood (collected 2020 22:22)  Source: .Blood None  Gram Stain (2020 17:42):    Growth in aerobic bottle: Gram Positive Cocci in Clusters    Growth in anaerobic bottle: Gram Positive Cocci in Clusters  Final Report (2020 10:50):    Growth in aerobic and anaerobic bottles: Methicillin resistant    Staphylococcus aureus    See previous culture [id number]    Culture - Blood in AM (20 @ 14:30)    Gram Stain:   Growth in aerobic bottle: Yeast like cells  N/I - No interpretation available                                                         SDD – Sensitive Dose Dependent    -  5-Flucytosine: N/I For Candida species, no interpretation available for any invitro susceptibility testing.    -  Amphotericin B: N/I 0.25    -  Andulafungin: N/I 0.12    -  Caspofungin: N/I 0.06 CASPOFUNGIN: is indicated in the treatment of candidemia, intra-abdominal abscess, peritonitis, pleural space infections and esophageal candidiasis.    -  Fluconazole: N/I <=0.12 Fluconazole = Data established for mucosal disease, and is generally applicable for invasive disease.  Susceptibility is dependent on achieving the maximal possible blood level.  Doses of 400 MG/day or more may be required in adults with normal renalfunction and body habitus.    -  Itraconazole: N/I For Candida species, no interpretation available for any invitro susceptibility testing.    -  Micafungin: N/I 0.015    -  Posaconazole: N/I 0.015    -  Voriconazole: N/I <=0.008 VORICONAZOLE: The KRYSTEN has been determined by the colormetric microdilution method.    -  Multidrug (KPC pos) resistant organism: Nondet    -  Staphylococcus aureus: Nondet    Specimen Source: .Blood Blood-Peripheral    Organism: Blood Culture PCR    Organism: Brittny dubliniensis    Culture Results:   Growth in aerobic bottle: Brittny dubliniensis    Organism Identification: Blood Culture PCR  Candida dubliniensis    Method Type: PCR    Method Type: YSTMIC    Culture - Sputum . (20 @ 12:35)    Gram Stain:   Numerous polymorphonuclear leukocytes per low power field  Few Squamous epithelial cells per low power field  Numerous Gram Negative Rods per oil power field    Specimen Source: .Sputum Sputum    Radiology: all available radiological tests reviewed    Cardiac Echo:  The left ventricle is normal in size, wall thickness, wall motion and   contractility.   Estimated left ventricular ejection fraction is 60 %.   The right atrium is normal in size.   A catheter is noted in the right atrium.   The aortic valve is trileaflet with thin pliable leaflets.   The mitral valve leaflets appear thickened.   Trace mitral regurgitation is present.   The tricuspid valve leaflets appear mildly thickened and/or calcified, but   open well.   Trace tricuspid valve regurgitation is present.   Trace pulmonic valvular regurgitation is present.    < end of copied text >    < from: CT Abdomen and Pelvis w/ Oral Cont (20 @ 11:42) >  1.  Improved RIGHT lower lobe and RIGHT middle lobe pneumonia. Residual nodular opacities likely represent residual infection. Slightly increased RIGHT pleural effusion since the most recent prior study. Marked increase in LEFT pleural effusion since the prior study with new complete atelectasis/consolidation of the LEFT lower lobe.  2.  Increasing pericardial effusion.  3.  No evidence of bowel obstruction. PEG tube in place.  4.  Thickening versus incomplete distention of the cecum with nonvisualization of the appendix.  5.  Ascites with questionable thickening of the peritoneum. Limited evaluation due to lack of IV contrast material. Correlate with signs/symptoms of peritonitis.  6.  LEFT para-aortic increased soft tissue likely represents adenopathy which is stable from most recent exam but increased from study dated 2012.    < end of copied text >      Advanced directives addressed: full resuscitation

## 2020-07-30 PROCEDURE — 99232 SBSQ HOSP IP/OBS MODERATE 35: CPT

## 2020-07-30 PROCEDURE — 99233 SBSQ HOSP IP/OBS HIGH 50: CPT

## 2020-07-30 RX ORDER — OXYCODONE HYDROCHLORIDE 5 MG/1
10 TABLET ORAL EVERY 4 HOURS
Refills: 0 | Status: DISCONTINUED | OUTPATIENT
Start: 2020-07-30 | End: 2020-07-31

## 2020-07-30 RX ADMIN — Medication 1 APPLICATION(S): at 12:05

## 2020-07-30 RX ADMIN — Medication 0.5 MILLIGRAM(S): at 06:33

## 2020-07-30 RX ADMIN — Medication 1 APPLICATION(S): at 10:21

## 2020-07-30 RX ADMIN — Medication 100 MILLIGRAM(S): at 12:06

## 2020-07-30 RX ADMIN — Medication 1 APPLICATION(S): at 12:06

## 2020-07-30 RX ADMIN — Medication 5 MILLIGRAM(S): at 21:25

## 2020-07-30 RX ADMIN — CHLORHEXIDINE GLUCONATE 15 MILLILITER(S): 213 SOLUTION TOPICAL at 21:26

## 2020-07-30 RX ADMIN — Medication 2 MILLIGRAM(S): at 21:25

## 2020-07-30 RX ADMIN — Medication 0.1 MILLIGRAM(S): at 21:25

## 2020-07-30 RX ADMIN — Medication 0.1 MILLIGRAM(S): at 12:08

## 2020-07-30 RX ADMIN — Medication 1 MILLIGRAM(S): at 12:06

## 2020-07-30 RX ADMIN — FAMOTIDINE 20 MILLIGRAM(S): 10 INJECTION INTRAVENOUS at 21:25

## 2020-07-30 RX ADMIN — OXYCODONE HYDROCHLORIDE 10 MILLIGRAM(S): 5 TABLET ORAL at 17:43

## 2020-07-30 RX ADMIN — FAMOTIDINE 20 MILLIGRAM(S): 10 INJECTION INTRAVENOUS at 12:05

## 2020-07-30 RX ADMIN — OXYCODONE HYDROCHLORIDE 5 MILLIGRAM(S): 5 TABLET ORAL at 06:33

## 2020-07-30 RX ADMIN — Medication 100 MILLIGRAM(S): at 21:25

## 2020-07-30 RX ADMIN — Medication 500 MILLIGRAM(S): at 12:06

## 2020-07-30 RX ADMIN — OXYCODONE HYDROCHLORIDE 10 MILLIGRAM(S): 5 TABLET ORAL at 23:27

## 2020-07-30 RX ADMIN — ENOXAPARIN SODIUM 40 MILLIGRAM(S): 100 INJECTION SUBCUTANEOUS at 12:05

## 2020-07-30 RX ADMIN — Medication 1 TABLET(S): at 12:05

## 2020-07-30 RX ADMIN — Medication 0.1 MILLIGRAM(S): at 06:34

## 2020-07-30 RX ADMIN — CEFEPIME 100 MILLIGRAM(S): 1 INJECTION, POWDER, FOR SOLUTION INTRAMUSCULAR; INTRAVENOUS at 05:43

## 2020-07-30 RX ADMIN — OXYCODONE HYDROCHLORIDE 10 MILLIGRAM(S): 5 TABLET ORAL at 22:57

## 2020-07-30 RX ADMIN — CHLORHEXIDINE GLUCONATE 15 MILLILITER(S): 213 SOLUTION TOPICAL at 12:08

## 2020-07-30 RX ADMIN — OXYCODONE HYDROCHLORIDE 10 MILLIGRAM(S): 5 TABLET ORAL at 18:43

## 2020-07-30 RX ADMIN — Medication 0.5 MILLIGRAM(S): at 21:25

## 2020-07-30 NOTE — PROGRESS NOTE BEHAVIORAL HEALTH - NSBHCHARTREVIEWVS_PSY_A_CORE FT
Vital Signs Last 24 Hrs  T(C): 37.1 (29 Jul 2020 10:06), Max: 37.1 (28 Jul 2020 20:05)  T(F): 98.7 (29 Jul 2020 10:06), Max: 98.8 (28 Jul 2020 20:05)  HR: 93 (29 Jul 2020 10:06) (93 - 102)  BP: 119/86 (29 Jul 2020 10:06) (110/79 - 120/83)  BP(mean): --  RR: 18 (29 Jul 2020 10:06) (13 - 18)  SpO2: 100% (29 Jul 2020 10:06) (97% - 100%)
Vital Signs Last 24 Hrs  T(C): 37.1 (28 Jul 2020 09:03), Max: 37.1 (27 Jul 2020 20:15)  T(F): 98.8 (28 Jul 2020 09:03), Max: 98.8 (27 Jul 2020 20:15)  HR: 98 (28 Jul 2020 12:50) (97 - 115)  BP: 117/76 (28 Jul 2020 12:50) (111/73 - 127/80)  BP(mean): 86 (27 Jul 2020 15:00) (81 - 86)  RR: 18 (28 Jul 2020 09:03) (18 - 33)  SpO2: 100% (28 Jul 2020 09:03) (97% - 100%)
Vital Signs Last 24 Hrs  T(C): 37.1 (29 Jul 2020 10:06), Max: 37.1 (28 Jul 2020 20:05)  T(F): 98.7 (29 Jul 2020 10:06), Max: 98.8 (28 Jul 2020 20:05)  HR: 93 (29 Jul 2020 10:06) (93 - 102)  BP: 119/86 (29 Jul 2020 10:06) (110/79 - 120/83)  BP(mean): --  RR: 18 (29 Jul 2020 10:06) (13 - 18)  SpO2: 100% (29 Jul 2020 10:06) (97% - 100%)

## 2020-07-30 NOTE — CONSULT NOTE ADULT - CONSULT REASON
PEG placement
Asked to perform GRAHAM
B/L lower extremity ulcerative lesions
Gangrene of R. 5th toe
Hypoxia and tachypnea
LLE wound
NATALIA vs NATALIA on CKD
abx management
liver lesion
sob
unresponsive, hypoxic
Assist in clarifying who is the authorized decision-maker for this patient, who is non-capacitated due to acute illness and lacks a Health Care Proxy.

## 2020-07-30 NOTE — CONSULT NOTE ADULT - REASON FOR ADMISSION
Cellulitis with sepsis, symptomatic anemia.

## 2020-07-30 NOTE — PROGRESS NOTE ADULT - SUBJECTIVE AND OBJECTIVE BOX
Subjective:  Pt seen, requiring suctioning for trach secretions. Afeb w tmax of 100.3 last 24 hours.     Vital Signs:  Vital Signs Last 24 Hrs  T(C): 36.8 (07-30-20 @ 06:38), Max: 37.9 (07-29-20 @ 20:00)  T(F): 98.3 (07-30-20 @ 06:38), Max: 100.3 (07-29-20 @ 20:00)  HR: 101 (07-30-20 @ 06:38) (98 - 104)  BP: 120/83 (07-30-20 @ 06:38) (120/83 - 138/89)  RR: 16 (07-30-20 @ 06:38) (16 - 20)  SpO2: 98% (07-30-20 @ 06:38) (98% - 99%) on (O2)    Telemetry/Alarms:    Relevant labs, radiology and Medications reviewed            MEDICATIONS  (STANDING):  ALPRAZolam 0.5 milliGRAM(s) Oral two times a day  ascorbic acid 500 milliGRAM(s) Oral daily  cefepime   IVPB 2000 milliGRAM(s) IV Intermittent every 12 hours  cefepime   IVPB      chlorhexidine 0.12% Liquid 15 milliLiter(s) Oral Mucosa every 12 hours  cloNIDine 0.1 milliGRAM(s) Oral every 8 hours  collagenase Ointment 1 Application(s) Topical daily  Dakins Solution - 1/4 Strength 1 Application(s) Topical daily  doxycycline hyclate Capsule 100 milliGRAM(s) Oral every 12 hours  enoxaparin Injectable 40 milliGRAM(s) SubCutaneous daily  famotidine    Tablet 20 milliGRAM(s) Oral two times a day  folic acid 1 milliGRAM(s) Oral daily  melatonin 5 milliGRAM(s) Oral at bedtime  multivitamin 1 Tablet(s) Oral daily  silver sulfADIAZINE 1% Cream 1 Application(s) Topical daily  thiamine 100 milliGRAM(s) Oral daily    MEDICATIONS  (PRN):  acetaminophen   Tablet .. 650 milliGRAM(s) Oral every 4 hours PRN Temp greater or equal to 38C (100.4F), Mild Pain (1 - 3)  glucagon  Injectable 1 milliGRAM(s) IntraMuscular once PRN Glucose <70 milliGRAM(s)/deciLiter  hydrOXYzine hydrochloride 50 milliGRAM(s) Oral every 4 hours PRN Anxiety  loperamide 2 milliGRAM(s) Oral every 4 hours PRN Loose stool  oxyCODONE    IR 5 milliGRAM(s) Oral every 6 hours PRN Moderate Pain (4 - 6)      Physical exam  Gen NAD  Neuro Alert  neck- trach midline  Card RRR  Pulm decreased b/l bases  Abd soft, PEG in place  Ext warm,  dressings intact to b/l LE    I&O's Summary      Assessment  39y Female  w/ PAST MEDICAL & SURGICAL HISTORY:  Smoker  Heroin abuse  H/O Raynaud's syndrome  Rheumatoid arthritis  Lupus  Gall bladder stones  H/O appendicitis  H/O tubal ligation  admitted with complaints of Patient is a 39y old  Female who presents with a chief complaint of Cellulitis with sepsis, symptomatic anemia. (30 Jul 2020 12:03)  .  39y Female h/o lupus, RA, epilepsy (last seizure 8 yrs ago), chronic pain, IVDA admitted w weakness, failure to thrive. Found to have acute anemia, NATALIA, transaminitis, metabolic lactic acidosis, severe dehydration, severe protein calorie malnutrition. And severe sepsis with septic shock secondary to MRSA bacteremia and UTI treated with a two-week course of Vancomycin, developed worsening respiratory failure and was ultimately intubated on 6/27/20.  s/p tracheostomy 7/13. Treated for fungemia. S/p PEG 7/16/2020. 7/22 w respiratory distress, placed back on vent. CT of abdomen revealed left loculated posterior effusion. On cefepime for PNA.     Cont to observe pt, has been afebrile. If pt deteriorates/spikes fever and thoracentesis or chest tube deemed necessary, recommend IR drainage.  pulm consult appreciated  will cont to follow  cont TF via PEG    Discussed with Cardiothoracic Team at AM rounds. Subjective:  Pt seen, requiring much suctioning for trach secretions. Unable to cough up.  Afeb w tmax of 100.3 last 24 hours. Pt seen eating soft diet, tolerating well.    Vital Signs:  Vital Signs Last 24 Hrs  T(C): 36.8 (07-30-20 @ 06:38), Max: 37.9 (07-29-20 @ 20:00)  T(F): 98.3 (07-30-20 @ 06:38), Max: 100.3 (07-29-20 @ 20:00)  HR: 101 (07-30-20 @ 06:38) (98 - 104)  BP: 120/83 (07-30-20 @ 06:38) (120/83 - 138/89)  RR: 16 (07-30-20 @ 06:38) (16 - 20)  SpO2: 98% (07-30-20 @ 06:38) (98% - 99%) on (O2)    Telemetry/Alarms:    Relevant labs, radiology and Medications reviewed            MEDICATIONS  (STANDING):  ALPRAZolam 0.5 milliGRAM(s) Oral two times a day  ascorbic acid 500 milliGRAM(s) Oral daily  cefepime   IVPB 2000 milliGRAM(s) IV Intermittent every 12 hours  cefepime   IVPB      chlorhexidine 0.12% Liquid 15 milliLiter(s) Oral Mucosa every 12 hours  cloNIDine 0.1 milliGRAM(s) Oral every 8 hours  collagenase Ointment 1 Application(s) Topical daily  Dakins Solution - 1/4 Strength 1 Application(s) Topical daily  doxycycline hyclate Capsule 100 milliGRAM(s) Oral every 12 hours  enoxaparin Injectable 40 milliGRAM(s) SubCutaneous daily  famotidine    Tablet 20 milliGRAM(s) Oral two times a day  folic acid 1 milliGRAM(s) Oral daily  melatonin 5 milliGRAM(s) Oral at bedtime  multivitamin 1 Tablet(s) Oral daily  silver sulfADIAZINE 1% Cream 1 Application(s) Topical daily  thiamine 100 milliGRAM(s) Oral daily    MEDICATIONS  (PRN):  acetaminophen   Tablet .. 650 milliGRAM(s) Oral every 4 hours PRN Temp greater or equal to 38C (100.4F), Mild Pain (1 - 3)  glucagon  Injectable 1 milliGRAM(s) IntraMuscular once PRN Glucose <70 milliGRAM(s)/deciLiter  hydrOXYzine hydrochloride 50 milliGRAM(s) Oral every 4 hours PRN Anxiety  loperamide 2 milliGRAM(s) Oral every 4 hours PRN Loose stool  oxyCODONE    IR 5 milliGRAM(s) Oral every 6 hours PRN Moderate Pain (4 - 6)      Physical exam  Gen NAD  Neuro Alert  neck- trach midline  Card RRR  Pulm decreased b/l bases  Abd soft, PEG in place  Ext warm,  dressings intact to b/l LE    I&O's Summary      Assessment  39y Female  w/ PAST MEDICAL & SURGICAL HISTORY:  Smoker  Heroin abuse  H/O Raynaud's syndrome  Rheumatoid arthritis  Lupus  Gall bladder stones  H/O appendicitis  H/O tubal ligation  admitted with complaints of Patient is a 39y old  Female who presents with a chief complaint of Cellulitis with sepsis, symptomatic anemia. (30 Jul 2020 12:03)  .  39y Female h/o lupus, RA, epilepsy (last seizure 8 yrs ago), chronic pain, IVDA admitted w weakness, failure to thrive. Found to have acute anemia, NATALIA, transaminitis, metabolic lactic acidosis, severe dehydration, severe protein calorie malnutrition. And severe sepsis with septic shock secondary to MRSA bacteremia and UTI treated with a two-week course of Vancomycin, developed worsening respiratory failure and was ultimately intubated on 6/27/20.  s/p tracheostomy 7/13. Treated for fungemia. S/p PEG 7/16/2020. 7/22 w respiratory distress, placed back on vent. CT of abdomen revealed left loculated posterior effusion. On cefepime for PNA.     Cont to observe pt, has been afebrile. If pt deteriorates/spikes fever and thoracentesis or chest tube deemed necessary, recommend IR drainage.  pulm consult appreciated  will cont to follow  cont TF via PEG    Discussed with Cardiothoracic Team at AM rounds.

## 2020-07-30 NOTE — PROGRESS NOTE ADULT - SUBJECTIVE AND OBJECTIVE BOX
CC/reason for follow up: sepsis, resp failure    S:   7/30: Lying in bed, alert, nods appropriately to questions.  Pt admits to increased pain, requesting better pain management.  Eager to trial diet.  No fever, chills, n, v.    REVIEW OF SYSTEMS: All other review of systems is negative unless indicated above.    Vital Signs Last 24 Hrs  T(C): 36.8 (30 Jul 2020 06:38), Max: 37.9 (29 Jul 2020 20:00)  T(F): 98.3 (30 Jul 2020 06:38), Max: 100.3 (29 Jul 2020 20:00)  HR: 101 (30 Jul 2020 06:38) (98 - 104)  BP: 120/83 (30 Jul 2020 06:38) (120/83 - 138/89)  BP(mean): --  RR: 16 (30 Jul 2020 06:38) (16 - 20)  SpO2: 98% (30 Jul 2020 06:38) (98% - 99%)    Gen - chronically ill appearing, cooperative, in no acute distress  HEENT- PERRL, moist mucus membranes, OP clear. poor and partial dentition   CV - RRR, No m/r/g, +pulses, +edema of b/l hands  Lungs - Good effort, diminished BS in the bases bilaterally. trach collar intact  Abdomen - Soft, nontender, nondistended, +BS, No rebound/rigidity/guarding  Ext - No cyanosis/clubbing.   Skin - multiple ulcers/wounds at different stages throughout body/extremities. No jaundice. skin breakdown at trach site  Psych- Alert & oriented   Neuro- no facial droop, EOMI. moves all ext    MEDICATIONS  (STANDING):  ALPRAZolam 0.5 milliGRAM(s) Oral two times a day  ascorbic acid 500 milliGRAM(s) Oral daily  cefepime   IVPB 2000 milliGRAM(s) IV Intermittent every 12 hours  cefepime   IVPB      chlorhexidine 0.12% Liquid 15 milliLiter(s) Oral Mucosa every 12 hours  cloNIDine 0.1 milliGRAM(s) Oral every 8 hours  collagenase Ointment 1 Application(s) Topical daily  Dakins Solution - 1/4 Strength 1 Application(s) Topical daily  doxycycline hyclate Capsule 100 milliGRAM(s) Oral every 12 hours  enoxaparin Injectable 40 milliGRAM(s) SubCutaneous daily  famotidine    Tablet 20 milliGRAM(s) Oral two times a day  folic acid 1 milliGRAM(s) Oral daily  melatonin 5 milliGRAM(s) Oral at bedtime  multivitamin 1 Tablet(s) Oral daily  silver sulfADIAZINE 1% Cream 1 Application(s) Topical daily  thiamine 100 milliGRAM(s) Oral daily    MEDICATIONS  (PRN):  acetaminophen   Tablet .. 650 milliGRAM(s) Oral every 4 hours PRN Temp greater or equal to 38C (100.4F), Mild Pain (1 - 3)  glucagon  Injectable 1 milliGRAM(s) IntraMuscular once PRN Glucose <70 milliGRAM(s)/deciLiter  hydrOXYzine hydrochloride 50 milliGRAM(s) Oral every 4 hours PRN Anxiety  loperamide 2 milliGRAM(s) Oral every 4 hours PRN Loose stool  oxyCODONE    IR 10 milliGRAM(s) Oral every 4 hours PRN Severe Pain (7 - 10)            Assessment and Plan:  40 yo female with a pmh/o lupus, RA, Raynaud, epilepsy (last seizure 8 yrs ago), IVDA (clean for 4 years), who presented to ED today due to weakness. found to have MRSA bacteremia, NATALIA, transaminitis, metabolic lactic acidosis,  respiratory failure requiring intubation now s/p trach (7/13), septic shock 2/2 MRSA bacteremia and Fungemia, treated. The patient was on trach collar and was transferred to floor awaiting placement but on 7/22 pm redeveloped acute respiratory failure, placed back on vent with fever. now back on trach collar and transferred to the floor.    Recurrent Acute respiratory failure w/ hypoxia / aspiration PNA / loculated left pleural effusion:  - s/p tracheostomy 7/13, s/p PEG tube 7/16   - s/p course of cefepime x 7 days  - pulm following  - tube feeds per nutrition  - start dysphagia diet w/ honey thick liquids per swallow specialist  - monitor for fevers    sepsis / Bacteremia / fungemia / numerous skin ulcerations / toe necrosis w/ chronic OM: w/ MRSA and candida:   - s/p vancomycin # 14  and fluconazole # 14 days  - doxycycline 100 mg PO q12h # 18  - repeat cultures negative  - wound care  - podiatry following   - PT    Anemia, normocytic:  - Stable    Lupus/ RA/ Raynaud / weight loss / anxiety d/o:  - c/w home meds    NATALIA: RESOLVED    dvt prophylaxis:  - Lovenox     Code status:   -full    Dispo:  -Discharge to [facility], [place] once medically stable

## 2020-07-30 NOTE — PROGRESS NOTE BEHAVIORAL HEALTH - SUMMARY
38 yo, single,  female; with a past medical history of lupus, RA, reynards, epilepsy (last seizure 8 years ago), with a substance use history of IVDA (clean for 4 years), who presented to ED due to weakness per record. Patient was verbal and able to communicate on presentation; however, she ultimately required intubation and is currently tracked. She was admitted for cellulitis and sepsis. Psychiatry consulted for depression.    Patient presenting complaining of anxiety with panic. Patient denies depressed mood or hopelessness or suicidal ideation. Denies manic / psychotic symptoms. No delusions elicited. Patient symptoms not indicating imminent risk for harm to self; not warranting involuntary in-patient hospitalization. Patient is motivation for psychotropic management.    PLAN  1. Xanax 0.5 mg twice daily.
40 yo, single,  female; with a past medical history of lupus, RA, reynards, epilepsy (last seizure 8 years ago), with a substance use history of IVDA (clean for 4 years), who presented to ED due to weakness per record. Patient was verbal and able to communicate on presentation; however, she ultimately required intubation and is currently tracked. She was admitted for cellulitis and sepsis. Psychiatry consulted for depression.    Patient presenting complaining of anxiety with panic. Patient denies depressed mood or hopelessness or suicidal ideation. Denies manic / psychotic symptoms. No delusions elicited. Patient symptoms not indicating imminent risk for harm to self; not warranting involuntary in-patient hospitalization. Patient is motivation for psychotropic management.    PLAN  1. Xanax 0.5 mg twice daily.  2. Zoloft 50 mg daily.
40 yo, single,  female; with a past medical history of lupus, RA, reynards, epilepsy (last seizure 8 years ago), with a substance use history of IVDA (clean for 4 years), who presented to ED due to weakness per record. Patient was verbal and able to communicate on presentation; however, she ultimately required intubation and is currently tracked. She was admitted for cellulitis and sepsis. Psychiatry consulted for depression.    Patient presenting complaining of anxiety with panic. Patient denies depressed mood or hopelessness or suicidal ideation. Denies manic / psychotic symptoms. No delusions elicited. Patient symptoms not indicating imminent risk for harm to self; not warranting involuntary in-patient hospitalization. Patient is motivation for psychotropic management.    PLAN  1. Xanax 0.5 mg twice daily.

## 2020-07-30 NOTE — PROGRESS NOTE BEHAVIORAL HEALTH - RISK ASSESSMENT
LOW RISK     ACUTE RISK FACTORS: anxiety     CHRONIC RISK FACTORS: medical comorbidities     PROTECTIVE FACTORS: no suicidal ideation/intent/plan, future oriented, motivation for psychotropic management

## 2020-07-30 NOTE — PROGRESS NOTE ADULT - SUBJECTIVE AND OBJECTIVE BOX
Date of service: 7/30/2020  Chief Complaint: B/L lower extremity wounds     HPI: Pt was examined at bedside by podiatry with attending present, for B/L lower extremity ulcerative lesions. Pt is a 38 yo female who has been admitted to the CCU for septic shock, UTI with MRSA and bacteremia. Noted Pt is currently intubated and non verbal, she is alert and oriented. The patient is also being treated for anemia, acute respiratory failure and toxic metabolic encephalopathy. Of Note, Pt has a history of IVDA drug use and is critically ill and at risk for acute decompensation possible death per the intensivist. Due to the patient being intubated all additional medical history and information was gathered from the patients chart.    7/30: Patient seen and examined by podiatry for follow-up evaluation of right and left lower extremity wounds. Patient noted be arousable at bedside and patient s/p tracheostomy procedure performed on 7/13/2020 and PEG placed on 7/16/2020. Pt demeanor has significantly improved and she was able to say a few words this morning. Patient has had no overnight events or acute changes. All further HPI obtained via patient's chart.    REVIEW OF SYSTEMS:  Unable to assess due to pt's inability to communicate per intubation    Allergies:  morphine (Unknown)    Past Medical History:  Lupus, RA, Reynard's Epilepsy, IVDA     Family History:  No pertinent family history in first degree relatives: cannot recall family history at this time    Social History:  lives alone  single    MEDICATIONS  (STANDING):  ALPRAZolam 0.5 milliGRAM(s) Oral two times a day  ascorbic acid 500 milliGRAM(s) Oral daily  cefepime   IVPB 2000 milliGRAM(s) IV Intermittent every 12 hours  cefepime   IVPB      chlorhexidine 0.12% Liquid 15 milliLiter(s) Oral Mucosa every 12 hours  cloNIDine 0.1 milliGRAM(s) Oral every 8 hours  collagenase Ointment 1 Application(s) Topical daily  Dakins Solution - 1/4 Strength 1 Application(s) Topical daily  doxycycline hyclate Capsule 100 milliGRAM(s) Oral every 12 hours  enoxaparin Injectable 40 milliGRAM(s) SubCutaneous daily  famotidine    Tablet 20 milliGRAM(s) Oral two times a day  folic acid 1 milliGRAM(s) Oral daily  melatonin 5 milliGRAM(s) Oral at bedtime  multivitamin 1 Tablet(s) Oral daily  silver sulfADIAZINE 1% Cream 1 Application(s) Topical daily  thiamine 100 milliGRAM(s) Oral daily    MEDICATIONS  (PRN):  acetaminophen   Tablet .. 650 milliGRAM(s) Oral every 4 hours PRN Temp greater or equal to 38C (100.4F), Mild Pain (1 - 3)  glucagon  Injectable 1 milliGRAM(s) IntraMuscular once PRN Glucose <70 milliGRAM(s)/deciLiter  hydrOXYzine hydrochloride 50 milliGRAM(s) Oral every 4 hours PRN Anxiety  loperamide 2 milliGRAM(s) Oral every 4 hours PRN Loose stool  oxyCODONE    IR 5 milliGRAM(s) Oral every 6 hours PRN Moderate Pain (4 - 6)    Vital Signs Last 24 Hrs  T(C): 36.8 (30 Jul 2020 06:38), Max: 37.9 (29 Jul 2020 20:00)  T(F): 98.3 (30 Jul 2020 06:38), Max: 100.3 (29 Jul 2020 20:00)  HR: 101 (30 Jul 2020 06:38) (98 - 104)  BP: 120/83 (30 Jul 2020 06:38) (120/83 - 138/89)  RR: 16 (30 Jul 2020 06:38) (16 - 20)  SpO2: 98% (30 Jul 2020 06:38) (98% - 99%)      Physical examination:  Constitutional: Pt intubated, lying in bed    Focused LE examination:   Vasc: DP and PT pulses non palpable b/l, CFT < 5 x 10, TG: warm to warm  Neuro and MSK: Unable to assess due to patient's condition    Derm:  Rt LE  - Full thickness ulcer with exposed peroneal tendons on lateral aspect of distal RLE that + probes to Fibula, no drainage, + undermining all around the clock, + tenderness. Negative crepitus or fluctuance to area of lateral aspect of the distal RLE.  - Rt 5th toe demonstrates a full thickness with less fibronecotic wound bed and evidence of increasing granulation tissue compared to the prior examination, measuring approximately 7.2 x 4.6 x 0.8 cm, - purulence, - malodor, - fluctuance +Probe to 5th metatarsal head, EDL tendon exposed and + undermining from the medial aspect, + tunneling to the 9 hour.     Lt LE:  -Full thickness ulcer round in shape and approx  0.5 cm distal to lateral malleolus, tunneling to 3 and 6 o'clock positions, + undermining all around, - drainage, - fluctuance - malodor, + probe to bone to the lateral malleolus, - purulence; negative crepitus and - fluctuance  - Multiple Full thickness ulcerations with exposed Achilles tendon noted to the posterior surface of the left distal 1/3 aspect of the left leg.  -Swelling and mild edema noted over the medial malleolus of LLE. Intact serous blister noted to the medial malleolar region. Edema noted to the dorsal-lateral aspect of the left fifth MPJ, noted to be mostly likely pressure induced; area of black-blue discoloration noted to the the left fifth sub met 5 region.   -Partial thickness ulcer, 4th interdigital space with presence of dry blood, - probe to bone, - undermining - tunneling  - Non gradable eschar on 3rd digit on Lt foot  - Full thickness ulceration noted to the dorsum of the left 1st MPJ. Positive probing to the left 1st MPJ. Positive purulence exuded from the wound with milking of the left foot.  - Full thickness ulceration noted to the lateral aspect of the upper 1/3 aspect of the leg at the level of the fibular notch. + tendon exposed; no drainage with palpation of the periwound area; no malodor exuded from the wound, negative fluctuance and negative crepitus to the area. Full thickness ulceration L fibular notch area noted to be covered with Alleyvn pad.   - Full thickness ulceration noted to plantar aspect of left foot sub metatarsal 5 region. + Serous drainage noted with milking of the left foot. - fluctuance or crepitus felt to the sub met 5 left foot. No malodor exuded from drainage to the left foot. - probe to bone, - undermining and - tunneling.       Labs:  Complete Blood Count + Automated Diff (07.23.20 @ 12:20)    WBC Count: 9.06 K/uL    RBC Count: 2.59 M/uL    Hemoglobin: 7.5 g/dL    Hematocrit: 24.4 %    Mean Cell Volume: 94.2 fl    Mean Cell Hemoglobin: 29.0 pg    Mean Cell Hemoglobin Conc: 30.7 gm/dL    Red Cell Distrib Width: 15.1 %    Platelet Count - Automated: 480 K/uL    Auto Neutrophil #: 7.58 K/uL    Auto Lymphocyte #: 0.75 K/uL    Auto Monocyte #: 0.46 K/uL    Auto Eosinophil #: 0.13 K/uL    Auto Basophil #: 0.04 K/uL    Auto Neutrophil %: 83.7: Differential percentages must be correlated with absolute numbers for  clinical significance. %    Auto Lymphocyte %: 8.3 %    Auto Monocyte %: 5.1 %    Auto Eosinophil %: 1.4 %    Auto Basophil %: 0.4 %    Auto Immature Granulocyte %: 1.1 %    Basic Metabolic Panel (07.26.20 @ 09:10)    Sodium, Serum: 138 mmol/L    Potassium, Serum: 4.4 mmol/L    Chloride, Serum: 106 mmol/L    Carbon Dioxide, Serum: 23 mmol/L    Anion Gap, Serum: 9 mmol/L    Blood Urea Nitrogen, Serum: 34 mg/dL    Creatinine, Serum: 0.79 mg/dL    Glucose, Serum: 99 mg/dL    Calcium, Total Serum: 7.9 mg/dL    eGFR if Non : 95: Interpretative comment  The units for eGFR are mL/min/1.73M2 (normalized body surface area). The  eGFR is calculated from a serum creatinine using the CKD-EPI equation.  Other variables required for calculation are race, age and sex. Among  patients with chronic kidney disease (CKD), the eGFR is useful in  determining the stage of disease according to KDOQI CKD classification.  All eGFR results are reported numerically with the following  interpretation.          GFR                    With                 Without     (ml/min/1.73 m2)    Kidney Damage       Kidney Damage        >= 90                    Stage 1                     Normal        60-89                    Stage 2                     Decreased GFR        30-59     Stage 3                     Stage 3        15-29                    Stage 4                     Stage 4        < 15                      Stage 5                     Stage 5  Each stage of CKD assumes that the associated GFR level has been in  effect for at least 3 months. Determination of stages one and two (with  eGFR > 59 ml/min/m2) requires estimation of kidney damage for at least 3  months as defined by structural or functional abnormalities.  Limitations: All estimates of GFR will be less accurate for patients at  extremes of muscle mass (including but not limited to frail elderly,  critically ill, or cancer patients), those with unusual diets, and those  with conditions associated with reduced secretion or extrarenal  elimination of creatinine. The eGFR equation is not recommended for use  in patients with unstable creatinine levels. mL/min/1.73M2    eGFR if African American: 110 mL/min/1.73M2               Culture - Blood in AM (07.06.20 @ 14:30)    Gram Stain:   Growth in aerobic bottle: Yeast like cells    -  5-Flucytosine: N/I For Candida species, no interpretation available for any invitro susceptibility testing.    -  Amphotericin B: N/I 0.25    -  Andulafungin: N/I 0.12    -  Caspofungin: N/I 0.06 CASPOFUNGIN: is indicated in the treatment of candidemia, intra-abdominal abscess, peritonitis, pleural space infections and esophageal candidiasis.    -  Fluconazole: N/I <=0.12 Fluconazole = Data established for mucosal disease, and is generally applicable for invasive disease.  Susceptibility is dependent on achieving the maximal possible blood level.  Doses of 400 MG/day or more may be required in adults with normal renalfunction and body   habitus.    -  Itraconazole: N/I For Candida species, no interpretation available for any invitro susceptibility testing.    -  Micafungin: N/I 0.015    -  Posaconazole: N/I 0.015    -  Voriconazole: N/I <=0.008 VORICONAZOLE: The KRYSTEN has been determined by the colormetric microdilution method.    -  Multidrug (KPC pos) resistant organism: Nondet    -  Staphylococcus aureus: Nondet    -  Methicillin resistant Staphylococcus aureus (MRSA): Nondet    -  Coagulase negative Staphylococcus: Nondet    -  Enterococcus species: Nondet    -  Vancomycin resistant Enterococcus sp.: Nondet    -  Escherichia coli: Nondet    -  Klebsiella oxytoca: Nondet    -  Klebsiella pneumoniae: Nondet    -  Serratia marcescens: Nondet    -  Haemophilus influenzae: Nondet    -  Listeria monocytogenes: Nondet    -  Neisseria meningitidis: Nondet    -  Pseudomonas aeruginosa: Nondet    -  Acinetobacter baumanii: Nondet    -  Enterobacter cloacae complex: Nondet    -  Streptococcus sp. (Not Grp A, B or S pneumoniae): Nondet    -  Streptococcus agalactiae (Group B): Nondet    -  Streptococcus pyogenes (Group A): Nondet    -  Streptococcus pneumoniae: Nondet    -  Candida albicans: Nondet    -  Candida glabrata: Nondet    -  Candida krusei: Nondet    -  Candida parapsilosis: Nondet    -  Candida tropicalis: Nondet    -  Interpretation: See note This test method is not approved by the FDA and is for research use only.  The performance characteristics of this assay may have been determined by [facility] and [facility], [place], [place]                            N/I - No interpretation available                                                         SDD – Sensitive Dose Dependent    Specimen Source: .Blood Blood-Peripheral    Organism: Blood Culture PCR    Organism: Brittny dubliniensis    Culture Results:   Growth in aerobic bottle: Brittny dubliniensis  "Due to technical problems, Proteus sp. will Not be reported as part of  the BCID panel until further notice"  ***Blood Panel PCR results on this specimen are available  approximately 3 hours after the Gram stain result.***  Gram stain, PCR, and/or culture results may not always  correspond due to difference in methodologies.  ************************************************************  This PCR assay was performed using [employer].  The following targets are tested for: Enterococcus,  vancomycin resistant enterococci, Listeria monocytogenes,  coagulase negative staphylococci, S. aureus,  methicillin resistant S. aureus, Streptococcus agalactiae  (Group B), S. pneumoniae, S. pyogenes (Group A),  Acinetobacter baumannii, Enterobacter cloacae, E. coli,  Klebsiella oxytoca, K. pneumoniae, Proteus sp.,  Serratia marcescens, Haemophilus influenzae,  Neisseria meningitidis, Pseudomonas aeruginosa, Candida  albicans, C. glabrata, C krusei, C parapsilosis,  C. tropicalis and the KPC resistance gene.    Organism Identification: Blood Culture PCR  Candida dubliniensis    Method Type: PCR    Method Type: YSTMIC      Imaging:    < from: US Duplex Arterial Lower Ext Compl, Bilateral (07.14.20 @ 14:49) >  IMPRESSION:  No evidence of a hemodynamically significant stenosis or occlusion in the visualized lower extremity arteries.  Subcutaneous edema throughout both lower extremities with fluid collections in both groins.  Monophasic waveforms throughout both lower extremities.  < end of copied text >      < from: TTE Echo Complete w/o Contrast w/ Doppler (06.27.20 @ 08:58) >   Impression   Summary   The mid to apical anterolateral to anterior segments are severely   hypokinetic. The apical inferoseptal wall appears hypokinetic.   Estimated left ventricular ejection fraction is 45-50 %.   A catheter wire is seen in the right atrium.   Mild (1+) tricuspid valve regurgitation is present.   Mild pulmonary hypertension.  < end of copied text >      < from: Xray Ankle Complete 3 Views, Bilateral (07.09.20 @ 16:42) >  EXAM:  XR ANKLE COMP MIN 3 VIEWS BI                        *** ADDENDUM 07/10/2020  ***                                                           Addendum:    The posterior soft tissue gas of the left ankle may be related to skin ulcerationor early fistulous tract formation. Clinical correlation is suggested.  *** END OF ADDENDUM 07/10/2020  ***  PROCEDURE DATE:  07/09/2020    INTERPRETATION:  Bilateral ankles  HISTORY: Bilateral ulcers    Three views of the right ankle and three views of the left ankle show no evidence of fracture nor destructive change. The joint spaces are maintained.  Subcutaneous soft tissue gas is present behind the left ankle.  IMPRESSION: Soft tissue gas as noted.  Thank you for this referral  ***Please see the addendum at the top of this report. It may contain additional important information or changes.****  < end of copied text >

## 2020-07-30 NOTE — CONSULT NOTE ADULT - CONSULT REQUESTED BY NAME
Chris, Base
DR layton
Dr Brown
Dr Mcadams
Dr RADHA Brown
Dr. Adamson
Dr. Chiu
Kip
Podiatry
RRT
Critical Care PA
Halina Rayo and Dave Mcadams

## 2020-07-30 NOTE — CONSULT NOTE ADULT - PROVIDER SPECIALTY LIST ADULT
Ethics
Critical Care
Thoracic Surgery
Critical Care
Gastroenterology
Infectious Disease
Nephrology
Plastic Surgery
Podiatry
Pulmonology
Vascular Surgery
Cardiology

## 2020-07-30 NOTE — CONSULT NOTE ADULT - ASSESSMENT
PROBLEMS:    Acute respiratory failure w/ hypoxia s/p tracheostomy 7/13, s/p PEG tube 7/16   Loculated left pleural effusion  S/p MRSA bacteremia  S/p Fungemia  Prob asiration pneumonia w/ GNR  Multiple ulcerations of b/l UE and LE. necrotic 5th toe  Anxiety d/o  Weight loss  Anemia, normocytic   Lupus/ RA/ Raynaud  NATALIA, resolved   S/p sepsis. POA. now resolved    PLAN:    TRACH SUCTION  S/P REMOVAL OF STICHES  IV CEFEPIME  FU PLEURAL EFFUSION CLINICALLY  SUPPORTIVE CARE  DVT PROPHYLASIX

## 2020-07-30 NOTE — CONSULT NOTE ADULT - SUBJECTIVE AND OBJECTIVE BOX
HPI:    39 y.o.f with a pmh/o lupus, RA, raynuards, epilepsy (last seizure 8 yrs ago), IVDA (clean for 4 years), who admitted due to weakness. Pt grandmother  approx. 3 months ago and for the past almost 2 months she has been snorting two bags of heroin a day. Pt heroin use is partially due to depression and anxiety since gma passed away however also due to financial reasons as can no longer afford oxycodone which she had been taking for her lupus/RA chronic pain as Rx. pat hospital course is complicated with MRSA bacteremia, NATALIA, transaminitis, metabolic lactic acidosis, respiratory failure requiring intubation now s/p trach (), septic shock 2/2 MRSA bacteremia and Fungemia, patient is on trach collar and is transferred to floow awaiting placement,  pm redeveloped acute respiratory failure, placed back on vent with fever. pat now on floor seen for pulmonary increased secreations, pat episode of pluggings respond to suction, pat still trach stiches going to remove today by surgical resident.    PAST MEDICAL & SURGICAL HISTORY:  Smoker  Heroin abuse  H/O Raynaud's syndrome  Rheumatoid arthritis  Lupus  Gall bladder stones  H/O appendicitis  H/O tubal ligation      Home Medications:      MEDICATIONS  (STANDING):  ALPRAZolam 0.5 milliGRAM(s) Oral two times a day  ascorbic acid 500 milliGRAM(s) Oral daily  cefepime   IVPB 2000 milliGRAM(s) IV Intermittent every 12 hours  cefepime   IVPB      chlorhexidine 0.12% Liquid 15 milliLiter(s) Oral Mucosa every 12 hours  cloNIDine 0.1 milliGRAM(s) Oral every 8 hours  collagenase Ointment 1 Application(s) Topical daily  Dakins Solution - 1/4 Strength 1 Application(s) Topical daily  doxycycline hyclate Capsule 100 milliGRAM(s) Oral every 12 hours  enoxaparin Injectable 40 milliGRAM(s) SubCutaneous daily  famotidine    Tablet 20 milliGRAM(s) Oral two times a day  folic acid 1 milliGRAM(s) Oral daily  melatonin 5 milliGRAM(s) Oral at bedtime  multivitamin 1 Tablet(s) Oral daily  silver sulfADIAZINE 1% Cream 1 Application(s) Topical daily  thiamine 100 milliGRAM(s) Oral daily    MEDICATIONS  (PRN):  acetaminophen   Tablet .. 650 milliGRAM(s) Oral every 4 hours PRN Temp greater or equal to 38C (100.4F), Mild Pain (1 - 3)  glucagon  Injectable 1 milliGRAM(s) IntraMuscular once PRN Glucose <70 milliGRAM(s)/deciLiter  hydrOXYzine hydrochloride 50 milliGRAM(s) Oral every 4 hours PRN Anxiety  loperamide 2 milliGRAM(s) Oral every 4 hours PRN Loose stool  oxyCODONE    IR 5 milliGRAM(s) Oral every 6 hours PRN Moderate Pain (4 - 6)      Allergies    morphine (Unknown)    Intolerances        SOCIAL HISTORY: Denies tobacco, etoh abuse or illicit drug use    FAMILY HISTORY:  No pertinent family history in first degree relatives: cannot recall family history at this time      Vital Signs Last 24 Hrs  T(C): 36.8 (2020 06:38), Max: 37.9 (2020 20:00)  T(F): 98.3 (2020 06:38), Max: 100.3 (2020 20:00)  HR: 101 (2020 06:38) (98 - 104)  BP: 120/83 (2020 06:38) (120/83 - 138/89)  BP(mean): --  RR: 16 (2020 06:38) (16 - 20)  SpO2: 98% (2020 06:38) (98% - 99%)        REVIEW OF SYSTEMS:    CONSTITUTIONAL:  As per HPI.  HEENT:  Eyes:  No diplopia or blurred vision. ENT:  No earache, sore throat or runny nose.  CARDIOVASCULAR:  No pressure, squeezing, tightness, heaviness or aching about the chest, neck, axilla or epigastrium.  RESPIRATORY:  No cough, shortness of breath, PND or orthopnea.  GASTROINTESTINAL:  No nausea, vomiting or diarrhea.  GENITOURINARY:  No dysuria, frequency or urgency.  MUSCULOSKELETAL:  As per HPI.  SKIN:  No change in skin, hair or nails.  NEUROLOGIC:  No paresthesias, fasciculations, seizures or weakness.  PSYCHIATRIC:  No disorder of thought or mood.  ENDOCRINE:  No heat or cold intolerance, polyuria or polydipsia.  HEMATOLOGICAL:  No easy bruising or bleedings:  .     PHYSICAL EXAMINATION:    GENERAL APPEARANCE:  Pt. is not currently dyspneic, in no distress. Pt. is alert, oriented, and pleasant.  HEENT:  Pupils are normal and react normally. No icterus. Mucous membranes well colored.  NECK:  Supple. No lymphadenopathy. Jugular venous pressure not elevated. Carotids equal.   HEART:   The cardiac impulse has a normal quality. Regular. Normal S1 and S2. There are no murmurs, rubs or gallops noted  CHEST:  Chest crackles to auscultation. Normal respiratory effort.  ABDOMEN:  Soft and nontender.   EXTREMITIES:  There is no cyanosis, clubbing or edema.   SKIN:  No rash or significant lesions are noted.    LABS:      RADIOLOGY & ADDITIONAL STUDIES:     Chest 1 View- PORTABLE-Routine (20 @ 10:21) >  IMPRESSION:   Persistent LEFT lower lobe airspace consolidation and/or effusion..

## 2020-07-30 NOTE — PROGRESS NOTE BEHAVIORAL HEALTH - NSBHFUPINTERVALHXFT_PSY_A_CORE
Patient communication by writing: has trach. Presenting anxious, stating experiencing periods of panic, with SOB and sweating. Reports history of being on Xanax: and that helps her. Denies depressed mood however. Denies hopelessness or suicidal ideation. Denies manic / psychotic symptoms. No delusions elicited. Patient requesting part of her hair to be cut.
Patient remains irritable secondary to needing bed adjusted. Reports anxiety remains but slightly improved with Xanax. Denies other complaints. Denies suicidal ideation. Patient has no psychotic symptoms. No delusions elicited.
Patient presenting irritable, agitated secondary to needing to be suctioned. Reports anxiety in the setting of difficulty breathing. Denies other complaints. Denies suicidal ideation stating "I do not want to die." Patient has no psychotic symptoms.

## 2020-07-30 NOTE — PROVIDER CONTACT NOTE (OTHER) - SITUATION
Notified Dr. Mir office regarding consult spoke with Sharmila from answering service.
Cardiothoracic is aware of consult
Dr Cisneros is aware of the consult.
Patient has sepsis
Pt  s/p right nephrectomy with reynoso catheter draining previously without any issues . pt noted voiding around reynoso after complaining of feeling the urge to urinate but unable to void .
Spoke to Dr Pena and is aware of consult
Spoke to Dr Watts regarding consult and he is aware.
called office; spoke to Birgit
called office; spoke to Marilyn
left message for callback on answering machine
notified service; spoke to Leah
pt had trach placed 7/13/2020- sutures are still in place. Can they be removed?
spoke to jimbo at service   made aware of [address] [place] [address]
spoke to office and are aware of consult
spoke with Dr. Correa
spoke with laxmi. she said on call rn is dr. alvarez she will give message.

## 2020-07-30 NOTE — PROGRESS NOTE BEHAVIORAL HEALTH - NSBHCONSULTFOLLOWAFTERCARE_PSY_A_CORE FT
continue current medication management as per  psychiatry recommendation.

## 2020-07-30 NOTE — PROGRESS NOTE BEHAVIORAL HEALTH - PRIMARY DX
Anxiety disorder, unspecified type

## 2020-07-30 NOTE — PROGRESS NOTE ADULT - ASSESSMENT
Assessment: 40 y/o female seen by podiatry at bedside in the ICU for the followin.) Full Thickness Fibronecrotic Ulcer of Right fifth digit  2.) Full Thickness Fibronecrotic Ulcer with exposed peroneal tendon, lateral aspect RLE  3.) Full thickness ulcer with exposed Achilles tendon LT  4.) Full thickness ulcer to lateral malleolus, LLE  5) Full thickness ulceration, dorsum of left 1st MTPJ  6) Partial Thickness ulcer 4th interspace, Lt foot  7) Full thickness ulceration, upper 1/3 aspect of left leg at level of fibular notch  8) Non gradable Eschar of 3rd digit, Lt foot  9) Multiple hyperpigmented scar-like lesions scattered all over LE, bl  10) Serous blister, medial malleolus L side  11) Full thickness ulceration, left foot plantar sub metatarsal 5 region  12) Pain in B/L Lower Extremities     Plan:  - Chart reviewed and patient evaluated by Podiatry team.  - X-rays of the b/l LEs reviewed at this time. Case discussed with radiologist in depth. Please note oval radiolucencies visualized in the b/l LEs X-rays are due to air in exposed open wounds.   - Final wound Cx. left 1st MTPJ full-thickness ulceration, reviewed  - All wounds to b/l LEs irrigated with copious amounts of sterile saline.  - SSD and adaptic applied over the right dorsolateral forefoot ulceration.   - Alleyvn pad and SSD applied over exposed peroneal tendons to RLE.   - Adaptic applied over exposed Achilles tendon of LLE.   - Silvadene applied to wounds of L 3rd digit and L lateral malleolus ulceration.  - Silvadene applied to full-thickness ulceration left foot submet 5 region.   - The b/l feet and ankles were wrapped with 4x4 gauze, ABD pad and kerlix, then secured with tape.   - Patient's right and left heels to be offloaded in b/l Z-flex boots to be worn at all times when patient bedbound.  - Vascular Consult, appreciated.   - Discussed case with ID department given purulence exuded from full thickness ulceration to left 1st MPJ and that deep wound cx was taken, Abx per ID appreciated. Antibiotic regimen per ID appreciated.  - Discussed case with Medicine department as well now that patient on Med/Surg inpatient unit.  - Discussed findings of full-thickness ulceration at level of left fibular notch with Vascular surgery and Wound care nursing departments. Local wound care of full-thickness ulceration at level of left fibular notch per Wound care nursing department in addition to care of patient's sacral ulceration, appreciated.  - No podiatric surgical intervention necessary at this time as no abscess collections found to b/l lower extremities   - At this time, podiatry will provide supportive, and noninvasive wound care to the wounds, b/l lower extremities, given the fact that the patient is deemed in non stable medically and at risk for acute decompensation.  - When patient is deemed stable for discharge by all other medical specialties, patient instructed [facility] continued [facility]iatric care. Daily local wound care instructions outlined below.   - Patient to be discharged with b/l surgical shoes for ambulation upon future discharge.  - Nutrition supplementation per Dietician given albumin level decreased, appreciated.   - Will continue to check patient's chart given patient nonverbal.  - Podiatry will follow the case closely while in house.     DAILY LOCAL WOUND CARE INSTRUCTIONS  Please irrigate all wounds to b/l LEs copious amounts of sterile saline.  - Apply Silvadene and adaptic over the right dorsolateral forefoot ulceration.   - Apply Alleyvn pad and SSD over exposed peroneal tendons to right leg.   - Apply Adaptic over exposed Achilles tendon of Left lower extremity.   - Apply Silvadene to wounds of Left 3rd digit and Left lateral malleolus ulceration.  - Apply Silvadene to the full-thickness ulceration to the bottom of the left foot underneath the left fifth toe.  - Lastly, wrap the bilateral lower extremities with 4x4 gauze, ABD pads and kerlix and then secure with tape.   - Please have patient ambulating in bilateral surgical shoes at all times during ambulation.

## 2020-07-31 ENCOUNTER — TRANSCRIPTION ENCOUNTER (OUTPATIENT)
Age: 40
End: 2020-07-31

## 2020-07-31 VITALS
RESPIRATION RATE: 18 BRPM | HEART RATE: 96 BPM | SYSTOLIC BLOOD PRESSURE: 129 MMHG | TEMPERATURE: 98 F | OXYGEN SATURATION: 98 % | DIASTOLIC BLOOD PRESSURE: 79 MMHG

## 2020-07-31 PROCEDURE — 99232 SBSQ HOSP IP/OBS MODERATE 35: CPT

## 2020-07-31 PROCEDURE — 99239 HOSP IP/OBS DSCHRG MGMT >30: CPT

## 2020-07-31 RX ORDER — FAMOTIDINE 10 MG/ML
1 INJECTION INTRAVENOUS
Qty: 0 | Refills: 0 | DISCHARGE
Start: 2020-07-31

## 2020-07-31 RX ORDER — SODIUM HYPOCHLORITE 0.125 %
1 SOLUTION, NON-ORAL MISCELLANEOUS
Qty: 0 | Refills: 0 | DISCHARGE
Start: 2020-07-31

## 2020-07-31 RX ORDER — ACETAMINOPHEN 500 MG
2 TABLET ORAL
Qty: 0 | Refills: 0 | DISCHARGE
Start: 2020-07-31

## 2020-07-31 RX ORDER — COLLAGENASE CLOSTRIDIUM HIST. 250 UNIT/G
1 OINTMENT (GRAM) TOPICAL
Qty: 0 | Refills: 0 | DISCHARGE
Start: 2020-07-31

## 2020-07-31 RX ORDER — OXYCODONE HYDROCHLORIDE 5 MG/1
1 TABLET ORAL
Qty: 0 | Refills: 0 | DISCHARGE
Start: 2020-07-31

## 2020-07-31 RX ORDER — ALPRAZOLAM 0.25 MG
1 TABLET ORAL
Qty: 0 | Refills: 0 | DISCHARGE
Start: 2020-07-31

## 2020-07-31 RX ADMIN — OXYCODONE HYDROCHLORIDE 10 MILLIGRAM(S): 5 TABLET ORAL at 03:32

## 2020-07-31 RX ADMIN — CHLORHEXIDINE GLUCONATE 15 MILLILITER(S): 213 SOLUTION TOPICAL at 11:32

## 2020-07-31 RX ADMIN — Medication 0.1 MILLIGRAM(S): at 05:00

## 2020-07-31 RX ADMIN — FAMOTIDINE 20 MILLIGRAM(S): 10 INJECTION INTRAVENOUS at 11:28

## 2020-07-31 RX ADMIN — Medication 100 MILLIGRAM(S): at 11:28

## 2020-07-31 RX ADMIN — Medication 1 APPLICATION(S): at 11:31

## 2020-07-31 RX ADMIN — Medication 1 APPLICATION(S): at 11:30

## 2020-07-31 RX ADMIN — Medication 0.1 MILLIGRAM(S): at 13:43

## 2020-07-31 RX ADMIN — Medication 1 APPLICATION(S): at 11:29

## 2020-07-31 RX ADMIN — Medication 500 MILLIGRAM(S): at 11:28

## 2020-07-31 RX ADMIN — Medication 1 TABLET(S): at 11:29

## 2020-07-31 RX ADMIN — Medication 100 MILLIGRAM(S): at 13:43

## 2020-07-31 RX ADMIN — OXYCODONE HYDROCHLORIDE 10 MILLIGRAM(S): 5 TABLET ORAL at 04:02

## 2020-07-31 RX ADMIN — ENOXAPARIN SODIUM 40 MILLIGRAM(S): 100 INJECTION SUBCUTANEOUS at 11:29

## 2020-07-31 RX ADMIN — Medication 0.5 MILLIGRAM(S): at 11:28

## 2020-07-31 RX ADMIN — Medication 1 MILLIGRAM(S): at 11:28

## 2020-07-31 NOTE — DISCHARGE NOTE PROVIDER - NSDCMRMEDTOKEN_GEN_ALL_CORE_FT
acetaminophen 325 mg oral tablet: 2 tab(s) orally every 4 hours, As needed, Temp greater or equal to 38C (100.4F), Mild Pain (1 - 3)  ALPRAZolam 0.5 mg oral tablet: 1 tab(s) orally 2 times a day  cloNIDine 0.1 mg oral tablet: 1 tab(s) orally every 8 hours  collagenase 250 units/g topical ointment: 1 application topically once a day  doxycycline monohydrate 100 mg oral capsule: 1 cap(s) orally every 12 hours day 19 of 6 weeks.  famotidine 20 mg oral tablet: 1 tab(s) orally 2 times a day  Multiple Vitamins oral tablet: 1 tab(s) orally once a day  oxyCODONE 10 mg oral tablet: 1 tab(s) orally every 4 hours, As needed, Severe Pain (7 - 10)  silver sulfADIAZINE 1% topical cream: 1 application topically once a day  sodium hypochlorite 0.125% topical solution: 1 application topically once a day

## 2020-07-31 NOTE — DISCHARGE NOTE PROVIDER - NSDCCPCAREPLAN_GEN_ALL_CORE_FT
PRINCIPAL DISCHARGE DIAGNOSIS  Diagnosis: Sepsis, due to unspecified organism, unspecified whether acute organ dysfunction present  Assessment and Plan of Treatment: bacteremia / MRSA/ fungemia / pneumonia / open wounds - STABLE  complete 6 weeks antibiotics with doxy.  wound care.  supportive care.      SECONDARY DISCHARGE DIAGNOSES  Diagnosis: Acute respiratory failure with hypoxia  Assessment and Plan of Treatment: with trach and peg  follow up pcp and pulmonolgist.    Diagnosis: Drug abuse and dependence  Assessment and Plan of Treatment:

## 2020-07-31 NOTE — PROGRESS NOTE ADULT - SUBJECTIVE AND OBJECTIVE BOX
Subjective:    pat on trach collar, pat eating, increased secreations.    MEDICATIONS  (STANDING):  ALPRAZolam 0.5 milliGRAM(s) Oral two times a day  ascorbic acid 500 milliGRAM(s) Oral daily  chlorhexidine 0.12% Liquid 15 milliLiter(s) Oral Mucosa every 12 hours  cloNIDine 0.1 milliGRAM(s) Oral every 8 hours  collagenase Ointment 1 Application(s) Topical daily  Dakins Solution - 1/4 Strength 1 Application(s) Topical daily  doxycycline hyclate Capsule 100 milliGRAM(s) Oral every 12 hours  enoxaparin Injectable 40 milliGRAM(s) SubCutaneous daily  famotidine    Tablet 20 milliGRAM(s) Oral two times a day  folic acid 1 milliGRAM(s) Oral daily  melatonin 5 milliGRAM(s) Oral at bedtime  multivitamin 1 Tablet(s) Oral daily  silver sulfADIAZINE 1% Cream 1 Application(s) Topical daily  thiamine 100 milliGRAM(s) Oral daily    MEDICATIONS  (PRN):  acetaminophen   Tablet .. 650 milliGRAM(s) Oral every 4 hours PRN Temp greater or equal to 38C (100.4F), Mild Pain (1 - 3)  glucagon  Injectable 1 milliGRAM(s) IntraMuscular once PRN Glucose <70 milliGRAM(s)/deciLiter  hydrOXYzine hydrochloride 50 milliGRAM(s) Oral every 4 hours PRN Anxiety  loperamide 2 milliGRAM(s) Oral every 4 hours PRN Loose stool  oxyCODONE    IR 10 milliGRAM(s) Oral every 4 hours PRN Severe Pain (7 - 10)      Allergies    morphine (Unknown)    Intolerances        Vital Signs Last 24 Hrs  T(C): 37.1 (31 Jul 2020 05:11), Max: 37.4 (30 Jul 2020 21:54)  T(F): 98.7 (31 Jul 2020 05:11), Max: 99.4 (30 Jul 2020 21:54)  HR: 106 (31 Jul 2020 05:11) (106 - 120)  BP: 133/89 (31 Jul 2020 05:11) (130/94 - 138/95)  BP(mean): --  RR: 23 (31 Jul 2020 05:11) (19 - 23)  SpO2: 99% (31 Jul 2020 05:11) (94% - 99%)      PHYSICAL EXAMINATION:    NECK:  Supple. No lymphadenopathy. Jugular venous pressure not elevated. Carotids equal.   HEART:   The cardiac impulse has a normal quality. Reg., Nl S1 and S2.  There are no murmurs, rubs or gallops noted  CHEST:  Chest crackles to auscultation. Normal respiratory effort.  ABDOMEN:  Soft and nontender.   EXTREMITIES:  There is no edema.       LABS:

## 2020-07-31 NOTE — PROGRESS NOTE ADULT - SUBJECTIVE AND OBJECTIVE BOX
Date of service: 7/31/2020  Chief Complaint: B/L lower extremity wounds     HPI: Pt was examined at bedside by podiatry with attending present, for B/L lower extremity ulcerative lesions. Pt is a 40 yo female who has been admitted to the CCU for septic shock, UTI with MRSA and bacteremia. Noted Pt is currently intubated and non verbal, she is alert and oriented. The patient is also being treated for anemia, acute respiratory failure and toxic metabolic encephalopathy. Of Note, Pt has a history of IVDA drug use and is critically ill and at risk for acute decompensation possible death per the intensivist. Due to the patient being intubated all additional medical history and information was gathered from the patients chart.    7/31: Patient seen and examined by podiatry for follow-up evaluation of right and left lower extremity wounds. Patient noted be arousable at bedside and patient s/p tracheostomy procedure performed on 7/13/2020 and PEG placed on 7/16/2020. Pt demeanor has significantly improved she was able to tolerate solid foods and having breakfast this morning. Patient has had no overnight events or acute changes. All further HPI obtained via patient's chart.    REVIEW OF SYSTEMS:  Unable to assess due to pt's inability to communicate per intubation    Allergies:  morphine (Unknown)    Past Medical History:  Lupus, RA, Reynard's Epilepsy, IVDA     Family History:  No pertinent family history in first degree relatives: cannot recall family history at this time    Social History:  lives alone  single    MEDICATIONS  (STANDING):  ALPRAZolam 0.5 milliGRAM(s) Oral two times a day  ascorbic acid 500 milliGRAM(s) Oral daily  cefepime   IVPB 2000 milliGRAM(s) IV Intermittent every 12 hours  cefepime   IVPB      chlorhexidine 0.12% Liquid 15 milliLiter(s) Oral Mucosa every 12 hours  cloNIDine 0.1 milliGRAM(s) Oral every 8 hours  collagenase Ointment 1 Application(s) Topical daily  Dakins Solution - 1/4 Strength 1 Application(s) Topical daily  doxycycline hyclate Capsule 100 milliGRAM(s) Oral every 12 hours  enoxaparin Injectable 40 milliGRAM(s) SubCutaneous daily  famotidine    Tablet 20 milliGRAM(s) Oral two times a day  folic acid 1 milliGRAM(s) Oral daily  melatonin 5 milliGRAM(s) Oral at bedtime  multivitamin 1 Tablet(s) Oral daily  silver sulfADIAZINE 1% Cream 1 Application(s) Topical daily  thiamine 100 milliGRAM(s) Oral daily    MEDICATIONS  (PRN):  acetaminophen   Tablet .. 650 milliGRAM(s) Oral every 4 hours PRN Temp greater or equal to 38C (100.4F), Mild Pain (1 - 3)  glucagon  Injectable 1 milliGRAM(s) IntraMuscular once PRN Glucose <70 milliGRAM(s)/deciLiter  hydrOXYzine hydrochloride 50 milliGRAM(s) Oral every 4 hours PRN Anxiety  loperamide 2 milliGRAM(s) Oral every 4 hours PRN Loose stool  oxyCODONE    IR 5 milliGRAM(s) Oral every 6 hours PRN Moderate Pain (4 - 6)    Vital Signs Last 24 Hrs  T(C): 37.1 (31 Jul 2020 05:11), Max: 37.4 (30 Jul 2020 21:54)  T(F): 98.7 (31 Jul 2020 05:11), Max: 99.4 (30 Jul 2020 21:54)  HR: 106 (31 Jul 2020 05:11) (106 - 120)  BP: 133/89 (31 Jul 2020 05:11) (130/94 - 138/95)  RR: 23 (31 Jul 2020 05:11) (19 - 23)  SpO2: 99% (31 Jul 2020 05:11) (94% - 99%)      Physical examination:  Constitutional: Pt intubated, lying in bed    Focused LE examination:   Vasc: DP and PT pulses non palpable b/l, CFT < 5 x 10, TG: warm to warm  Neuro and MSK: Unable to assess due to patient's condition    Derm:  Rt LE  - Full thickness ulcer with exposed peroneal tendons on lateral aspect of distal RLE that + probes to Fibula, no drainage, + undermining all around the clock, + tenderness. Negative crepitus or fluctuance to area of lateral aspect of the distal RLE.  - Rt 5th toe demonstrates a full thickness with less fibronecotic wound bed and evidence of increasing granulation tissue compared to the prior examination, measuring approximately 7.2 x 4.6 x 0.8 cm, - purulence, - malodor, - fluctuance +Probe to 5th metatarsal head, EDL tendon exposed and + undermining from the medial aspect, + tunneling to the 9 hour.     Lt LE:  -Full thickness ulcer round in shape and approx  0.5 cm distal to lateral malleolus, tunneling to 3 and 6 o'clock positions, + undermining all around, - drainage, - fluctuance - malodor, + probe to bone to the lateral malleolus, - purulence; negative crepitus and - fluctuance  - Multiple Full thickness ulcerations with exposed Achilles tendon noted to the posterior surface of the left distal 1/3 aspect of the left leg.  -Swelling and mild edema noted over the medial malleolus of LLE. Intact serous blister noted to the medial malleolar region. Edema noted to the dorsal-lateral aspect of the left fifth MPJ, noted to be mostly likely pressure induced; area of black-blue discoloration noted to the the left fifth sub met 5 region.   -Partial thickness ulcer, 4th interdigital space with presence of dry blood, - probe to bone, - undermining - tunneling  - Non gradable eschar on 3rd digit on Lt foot  - Full thickness ulceration noted to the dorsum of the left 1st MPJ. Positive probing to the left 1st MPJ. Positive purulence exuded from the wound with milking of the left foot.  - Full thickness ulceration noted to the lateral aspect of the upper 1/3 aspect of the leg at the level of the fibular notch. + tendon exposed; no drainage with palpation of the periwound area; no malodor exuded from the wound, negative fluctuance and negative crepitus to the area. Full thickness ulceration L fibular notch area noted to be covered with Alleyvn pad.   - Full thickness ulceration noted to plantar aspect of left foot sub metatarsal 5 region. + Serous drainage noted with milking of the left foot. - fluctuance or crepitus felt to the sub met 5 left foot. No malodor exuded from drainage to the left foot. - probe to bone, - undermining and - tunneling.       Labs:  Complete Blood Count + Automated Diff (07.23.20 @ 12:20)    WBC Count: 9.06 K/uL    RBC Count: 2.59 M/uL    Hemoglobin: 7.5 g/dL    Hematocrit: 24.4 %    Mean Cell Volume: 94.2 fl    Mean Cell Hemoglobin: 29.0 pg    Mean Cell Hemoglobin Conc: 30.7 gm/dL    Red Cell Distrib Width: 15.1 %    Platelet Count - Automated: 480 K/uL    Auto Neutrophil #: 7.58 K/uL    Auto Lymphocyte #: 0.75 K/uL    Auto Monocyte #: 0.46 K/uL    Auto Eosinophil #: 0.13 K/uL    Auto Basophil #: 0.04 K/uL    Auto Neutrophil %: 83.7: Differential percentages must be correlated with absolute numbers for  clinical significance. %    Auto Lymphocyte %: 8.3 %    Auto Monocyte %: 5.1 %    Auto Eosinophil %: 1.4 %    Auto Basophil %: 0.4 %    Auto Immature Granulocyte %: 1.1 %      Basic Metabolic Panel (07.26.20 @ 09:10)    Sodium, Serum: 138 mmol/L    Potassium, Serum: 4.4 mmol/L    Chloride, Serum: 106 mmol/L    Carbon Dioxide, Serum: 23 mmol/L    Anion Gap, Serum: 9 mmol/L    Blood Urea Nitrogen, Serum: 34 mg/dL    Creatinine, Serum: 0.79 mg/dL    Glucose, Serum: 99 mg/dL    Calcium, Total Serum: 7.9 mg/dL    eGFR if Non : 95: Interpretative comment  The units for eGFR are mL/min/1.73M2 (normalized body surface area). The  eGFR is calculated from a serum creatinine using the CKD-EPI equation.  Other variables required for calculation are race, age and sex. Among  patients with chronic kidney disease (CKD), the eGFR is useful in  determining the stage of disease according to KDOQI CKD classification.  All eGFR results are reported numerically with the following  interpretation.          GFR                    With                 Without     (ml/min/1.73 m2)    Kidney Damage       Kidney Damage        >= 90                    Stage 1                     Normal        60-89                    Stage 2                     Decreased GFR        30-59     Stage 3                     Stage 3        15-29                    Stage 4                     Stage 4        < 15                      Stage 5                     Stage 5  Each stage of CKD assumes that the associated GFR level has been in  effect for at least 3 months. Determination of stages one and two (with  eGFR > 59 ml/min/m2) requires estimation of kidney damage for at least 3  months as defined by structural or functional abnormalities.  Limitations: All estimates of GFR will be less accurate for patients at  extremes of muscle mass (including but not limited to frail elderly,  critically ill, or cancer patients), those with unusual diets, and those  with conditions associated with reduced secretion or extrarenal  elimination of creatinine. The eGFR equation is not recommended for use  in patients with unstable creatinine levels. mL/min/1.73M2    eGFR if African American: 110 mL/min/1.73M2               Culture - Blood in AM (07.06.20 @ 14:30)    Gram Stain:   Growth in aerobic bottle: Yeast like cells    -  5-Flucytosine: N/I For Candida species, no interpretation available for any invitro susceptibility testing.    -  Amphotericin B: N/I 0.25    -  Andulafungin: N/I 0.12    -  Caspofungin: N/I 0.06 CASPOFUNGIN: is indicated in the treatment of candidemia, intra-abdominal abscess, peritonitis, pleural space infections and esophageal candidiasis.    -  Fluconazole: N/I <=0.12 Fluconazole = Data established for mucosal disease, and is generally applicable for invasive disease.  Susceptibility is dependent on achieving the maximal possible blood level.  Doses of 400 MG/day or more may be required in adults with normal renalfunction and body   habitus.    -  Itraconazole: N/I For Candida species, no interpretation available for any invitro susceptibility testing.    -  Micafungin: N/I 0.015    -  Posaconazole: N/I 0.015    -  Voriconazole: N/I <=0.008 VORICONAZOLE: The KRYSTEN has been determined by the colormetric microdilution method.    -  Multidrug (KPC pos) resistant organism: Nondet    -  Staphylococcus aureus: Nondet    -  Methicillin resistant Staphylococcus aureus (MRSA): Nondet    -  Coagulase negative Staphylococcus: Nondet    -  Enterococcus species: Nondet    -  Vancomycin resistant Enterococcus sp.: Nondet    -  Escherichia coli: Nondet    -  Klebsiella oxytoca: Nondet    -  Klebsiella pneumoniae: Nondet    -  Serratia marcescens: Nondet    -  Haemophilus influenzae: Nondet    -  Listeria monocytogenes: Nondet    -  Neisseria meningitidis: Nondet    -  Pseudomonas aeruginosa: Nondet    -  Acinetobacter baumanii: Nondet    -  Enterobacter cloacae complex: Nondet    -  Streptococcus sp. (Not Grp A, B or S pneumoniae): Nondet    -  Streptococcus agalactiae (Group B): Nondet    -  Streptococcus pyogenes (Group A): Nondet    -  Streptococcus pneumoniae: Nondet    -  Candida albicans: Nondet    -  Candida glabrata: Nondet    -  Candida krusei: Nondet    -  Candida parapsilosis: Nondet    -  Candida tropicalis: Nondet    -  Interpretation: See note This test method is not approved by the FDA and is for research use only.  The performance characteristics of this assay may have been determined by [facility] [place]                            N/I - No interpretation available                                                         SDD – Sensitive Dose Dependent    Specimen Source: .Blood Blood-Peripheral    Organism: Blood Culture PCR    Organism: Brittny dubliniensis    Culture Results:   Growth in aerobic bottle: Brittny dubliniensis  "Due to technical problems, Proteus sp. will Not be reported as part of  the BCID panel until further notice"  ***Blood Panel PCR results on this specimen are available  approximately 3 hours after the Gram stain result.***  Gram stain, PCR, and/or culture results may not always  correspond due to difference in methodologies.  ************************************************************  This PCR assay was performed using [employer].  The following targets are tested for: Enterococcus,  vancomycin resistant enterococci, Listeria monocytogenes,  coagulase negative staphylococci, S. aureus,  methicillin resistant S. aureus, Streptococcus agalactiae  (Group B), S. pneumoniae, S. pyogenes (Group A),  Acinetobacter baumannii, Enterobacter cloacae, E. coli,  Klebsiella oxytoca, K. pneumoniae, Proteus sp.,  Serratia marcescens, Haemophilus influenzae,  Neisseria meningitidis, Pseudomonas aeruginosa, Candida  albicans, C. glabrata, C krusei, C parapsilosis,  C. tropicalis and the KPC resistance gene.    Organism Identification: Blood Culture PCR  Candida dubliniensis    Method Type: PCR    [facility]      Imaging:    < from: US Duplex Arterial Lower Ext Compl, Bilateral (07.14.20 @ 14:49) >  IMPRESSION:  No evidence of a hemodynamically significant stenosis or occlusion in the visualized lower extremity arteries.  Subcutaneous edema throughout both lower extremities with fluid collections in both groins.  Monophasic waveforms throughout both lower extremities.  < end of copied text >      < from: TTE Echo Complete w/o Contrast w/ Doppler (06.27.20 @ 08:58) >   Impression   Summary   The mid to apical anterolateral to anterior segments are severely   hypokinetic. The apical inferoseptal wall appears hypokinetic.   Estimated left ventricular ejection fraction is 45-50 %.   A catheter wire is seen in the right atrium.   Mild (1+) tricuspid valve regurgitation is present.   Mild pulmonary hypertension.  < end of copied text >      < from: Xray Ankle Complete 3 Views, Bilateral (07.09.20 @ 16:42) >  EXAM:  XR ANKLE COMP MIN 3 VIEWS BI                        *** ADDENDUM 07/10/2020  ***                                                           Addendum:    The posterior soft tissue gas of the left ankle may be related to skin ulcerationor early fistulous tract formation. Clinical correlation is suggested.  *** END OF ADDENDUM 07/10/2020  ***  PROCEDURE DATE:  07/09/2020    INTERPRETATION:  Bilateral ankles  HISTORY: Bilateral ulcers    Three views of the right ankle and three views of the left ankle show no evidence of fracture nor destructive change. The joint spaces are maintained.  Subcutaneous soft tissue gas is present behind the left ankle.  IMPRESSION: Soft tissue gas as noted.  Thank you for this referral  ***Please see the addendum at the top of this report. It may contain additional important information or changes.****  < end of copied text >

## 2020-07-31 NOTE — PROGRESS NOTE ADULT - REASON FOR ADMISSION
Cellulitis with sepsis, symptomatic anemia.

## 2020-07-31 NOTE — PROGRESS NOTE ADULT - SUBJECTIVE AND OBJECTIVE BOX
Subjective:  Pt seen, tolerated dysphagia diet. Afebrile overnight. Cont w much secretions from trach as per RN.    Vital Signs:  Vital Signs Last 24 Hrs  T(C): 37.1 (07-31-20 @ 05:11), Max: 37.4 (07-30-20 @ 21:54)  T(F): 98.7 (07-31-20 @ 05:11), Max: 99.4 (07-30-20 @ 21:54)  HR: 106 (07-31-20 @ 05:11) (106 - 120)  BP: 133/89 (07-31-20 @ 05:11) (130/94 - 138/95)  RR: 23 (07-31-20 @ 05:11) (19 - 23)  SpO2: 99% (07-31-20 @ 05:11) (94% - 99%) on (O2)    Telemetry/Alarms:    Relevant labs, radiology and Medications reviewed            MEDICATIONS  (STANDING):  ALPRAZolam 0.5 milliGRAM(s) Oral two times a day  ascorbic acid 500 milliGRAM(s) Oral daily  chlorhexidine 0.12% Liquid 15 milliLiter(s) Oral Mucosa every 12 hours  cloNIDine 0.1 milliGRAM(s) Oral every 8 hours  collagenase Ointment 1 Application(s) Topical daily  Dakins Solution - 1/4 Strength 1 Application(s) Topical daily  doxycycline hyclate Capsule 100 milliGRAM(s) Oral every 12 hours  enoxaparin Injectable 40 milliGRAM(s) SubCutaneous daily  famotidine    Tablet 20 milliGRAM(s) Oral two times a day  folic acid 1 milliGRAM(s) Oral daily  melatonin 5 milliGRAM(s) Oral at bedtime  multivitamin 1 Tablet(s) Oral daily  silver sulfADIAZINE 1% Cream 1 Application(s) Topical daily  thiamine 100 milliGRAM(s) Oral daily    MEDICATIONS  (PRN):  acetaminophen   Tablet .. 650 milliGRAM(s) Oral every 4 hours PRN Temp greater or equal to 38C (100.4F), Mild Pain (1 - 3)  glucagon  Injectable 1 milliGRAM(s) IntraMuscular once PRN Glucose <70 milliGRAM(s)/deciLiter  hydrOXYzine hydrochloride 50 milliGRAM(s) Oral every 4 hours PRN Anxiety  loperamide 2 milliGRAM(s) Oral every 4 hours PRN Loose stool  oxyCODONE    IR 10 milliGRAM(s) Oral every 4 hours PRN Severe Pain (7 - 10)      Physical exam  Gen NAD  Neuro Alert  neck- trach midline  Card RRR  Pulm decreased b/l bases  Abd soft, PEG in place  Ext warm,  dressings intact to b/l LE    I&O's Summary    30 Jul 2020 07:01  -  31 Jul 2020 07:00  --------------------------------------------------------  IN: 1475 mL / OUT: 400 mL / NET: 1075 mL        Assessment  39y Female  w/ PAST MEDICAL & SURGICAL HISTORY:  Smoker  Heroin abuse  H/O Raynaud's syndrome  Rheumatoid arthritis  Lupus  Gall bladder stones  H/O appendicitis  H/O tubal ligation  admitted with complaints of Patient is a 39y old  Female who presents with a chief complaint of Cellulitis with sepsis, symptomatic anemia. (31 Jul 2020 10:27)  .  39y Female h/o lupus, RA, epilepsy (last seizure 8 yrs ago), chronic pain, IVDA admitted w weakness, failure to thrive. Found to have acute anemia, NATALIA, transaminitis, metabolic lactic acidosis, severe dehydration, severe protein calorie malnutrition. And severe sepsis with septic shock secondary to MRSA bacteremia and UTI treated with a two-week course of Vancomycin, developed worsening respiratory failure and was ultimately intubated on 6/27/20.  s/p tracheostomy 7/13. Treated for fungemia. S/p PEG 7/16/2020. 7/22 w respiratory distress, placed back on vent. CT of abdomen revealed left loculated posterior effusion. On cefepime for PNA.     Cont to observe pt, has been afebrile. If pt deteriorates/spikes fever and thoracentesis or chest tube deemed necessary, recommend IR drainage.  will cont to follow  wean off TFs as per medicine/nutrirtion once tolerating diet and obtaining nutritional needs      Discussed with Cardiothoracic Team at AM rounds.

## 2020-07-31 NOTE — DISCHARGE NOTE PROVIDER - HOSPITAL COURSE
CC: weakness, Cellulitis with sepsis, symptomatic anemia.    HPI/Hospital course:    38 yo female with a pmhx of lupus, RA, Reynard's, epilepsy (last seizure 8 yrs ago), IVDA, who presented to ED today due to weakness. Pt states that her grandmother  approx. 3 months ago and for the past almost 2 months she has been snorting two bags of heroin a day. Pt states her heroin use is partially due to depression and anxiety since gma passed away however also due to financial reasons as can no longer afford oxycodone which she had been taking for her lupus/RA chronic pain as Rx. Pt states she has wounds all over her body from falls, ulcers from previous attempted injection sites, which are not healing and bleed (approx 2 tbs (RUE wound)) daily. Pt describes weakness as fatigue, endorses sob with exertion, weight loss due to decreased appetite from depression and drug use, palpitations. Denies n/v/d, constipation, HA, hematemesis, hematochezia, dark stools, abd pain, cp, fevers, cough, leg swelling.  She was admitted to ICU.  Pt found to have  septic shock due to MRSA bacteremia, fungemia, NATALIA, transaminitis, metabolic lactic acidosis,  respiratory failure requiring intubation now s/p trach () and PEG.  Hospital course complicated on  due to redeveloped acute respiratory failure, placed back on vent with fever, treated for aspiration pneumonia with improvement.  Pt eventually downgraded to GMF, HD stable on trach collar. She was started on dysphagia diet which she is tolerating.  Pt otherwise comfortable, doing better.  WOund care to her open sores are being addressed daily.         REVIEW OF SYSTEMS: All other review of systems is negative unless indicated above.        Vital Signs Last 24 Hrs    T(C): 37.1 (2020 05:11), Max: 37.4 (2020 21:54)    T(F): 98.7 (2020 05:11), Max: 99.4 (2020 21:54)    HR: 106 (2020 05:11) (106 - 120)    BP: 133/89 (2020 05:11) (130/94 - 138/95)    BP(mean): --    RR: 23 (2020 05:11) (19 - 23)    SpO2: 99% (2020 05:11) (94% - 99%)        Gen - chronically ill appearing, cooperative, in no acute distress, alert    HEENT- PERRL, moist mucus membranes, OP clear. poor and partial dentition     CV - RRR, No m/r/g, +pulses, +edema of b/l hands    Lungs - Good effort, diminished BS in the bases bilaterally. trach collar intact    Abdomen - Soft, nontender, nondistended, +BS, No rebound/rigidity/guarding    Ext - No cyanosis/clubbing.     Skin - multiple ulcers/wounds at different stages throughout body/extremities. No jaundice. skin breakdown at trach site    Psych- Alert & oriented     Neuro- no facial droop, EOMI. moves all ext        med/labs: Reviewed and interpreted         Assessment and Plan:  38 yo female with a pmh/o lupus, RA, Raynaud, epilepsy (last seizure 8 yrs ago), IVDA (clean for 4 years), who presented to ED today due to weakness. found to have MRSA bacteremia, NATALIA, transaminitis, metabolic lactic acidosis,  respiratory failure requiring intubation now s/p trach (), septic shock 2/2 MRSA bacteremia and Fungemia, treated. The patient was on trach collar and was transferred to floor awaiting placement but on  pm redeveloped acute respiratory failure, placed back on vent with fever. now back on trach collar and transferred to the floor where she is HD stable.         Recurrent Acute respiratory failure w/ hypoxia / aspiration PNA / loculated left pleural effusion:    - s/p tracheostomy , s/p PEG tube      - s/p course of cefepime x 7 days    - pulm following    - tube feeds per nutrition    - start dysphagia diet w/ honey thick liquids per swallow specialist    - afebrile        sepsis / Bacteremia / fungemia / numerous skin ulcerations / toe necrosis w/ chronic OM: w/ MRSA and candida:     - s/p vancomycin # 14  and fluconazole # 14 days    - doxycycline 100 mg PO q12h # 18 of 6 weeks.    - repeat cultures negative    - wound care    - podiatry following     - PT        Anemia, normocytic:    - Stable        Lupus/ RA/ Raynaud / weight loss / anxiety d/o:    - c/w home meds        NATALIA: RESOLVED        dvt prophylaxis:    - Lovenox         Code status:     -full        Discharge to [facility]        Attending Statement: 40 minutes spent on total encounter and discharge planning.

## 2020-07-31 NOTE — PROGRESS NOTE ADULT - ASSESSMENT
Assessment: 38 y/o female seen by podiatry at bedside in the ICU for the followin.) Full Thickness Fibronecrotic Ulcer of Right fifth digit  2.) Full Thickness Fibronecrotic Ulcer with exposed peroneal tendon, lateral aspect RLE  3.) Full thickness ulcer with exposed Achilles tendon LT  4.) Full thickness ulcer to lateral malleolus, LLE  5) Full thickness ulceration, dorsum of left 1st MTPJ  6) Partial Thickness ulcer 4th interspace, Lt foot  7) Full thickness ulceration, upper 1/3 aspect of left leg at level of fibular notch  8) Non gradable Eschar of 3rd digit, Lt foot  9) Multiple hyperpigmented scar-like lesions scattered all over LE, bl  10) Serous blister, medial malleolus L side  11) Full thickness ulceration, left foot plantar sub metatarsal 5 region  12) Pain in B/L Lower Extremities     Plan:  - Chart reviewed and patient evaluated by Podiatry team.  - X-rays of the b/l LEs reviewed at this time. Case discussed with radiologist in depth. Please note oval radiolucencies visualized in the b/l LEs X-rays are due to air in exposed open wounds.   - Final wound Cx. left 1st MTPJ full-thickness ulceration, reviewed  - All wounds to b/l LEs irrigated with copious amounts of sterile saline.  - SSD and adaptic applied over the right dorsolateral forefoot ulceration.   - Alleyvn pad and SSD applied over exposed peroneal tendons to RLE.   - Adaptic applied over exposed Achilles tendon of LLE.   - Silvadene applied to wounds of L 3rd digit and L lateral malleolus ulceration.  - Silvadene applied to full-thickness ulceration left foot submet 5 region.   - The b/l feet and ankles were wrapped with 4x4 gauze, ABD pad and kerlix, then secured with tape.   - Patient's right and left heels to be offloaded in b/l Z-flex boots to be worn at all times when patient bedbound.  - Vascular Consult, appreciated.   - Discussed case with ID department given purulence exuded from full thickness ulceration to left 1st MPJ and that deep wound cx was taken, Abx per ID appreciated. Antibiotic regimen per ID appreciated.  - Discussed case with Medicine department as well now that patient on Med/Surg inpatient unit.  - Discussed findings of full-thickness ulceration at level of left fibular notch with Vascular surgery and Wound care nursing departments. Local wound care of full-thickness ulceration at level of left fibular notch per Wound care nursing department in addition to care of patient's sacral ulceration, appreciated.  - No podiatric surgical intervention necessary at this time as no abscess collections found to b/l lower extremities   - At this time, podiatry will provide supportive, and noninvasive wound care to the wounds, b/l lower extremities, given the fact that the patient is deemed in non stable medically and at risk for acute decompensation.  - When patient is deemed stable for discharge by all other medical specialties, patient instructed to f/u at the [facility] for continued podiatric care. Daily local wound care instructions outlined below.   - Patient to be discharged with b/l surgical shoes for ambulation upon future discharge.  - Nutrition supplementation per Dietician given albumin level decreased, appreciated.   - Will continue to check patient's chart given patient nonverbal.  - Podiatry will follow the case closely while in house.     DAILY LOCAL WOUND CARE INSTRUCTIONS  Please irrigate all wounds to b/l LEs copious amounts of sterile saline.  - Apply Silvadene and adaptic over the right dorsolateral forefoot ulceration.   - Apply Alleyvn pad and SSD over exposed peroneal tendons to right leg.   - Apply Adaptic over exposed Achilles tendon of Left lower extremity.   - Apply Silvadene to wounds of Left 3rd digit and Left lateral malleolus ulceration.  - Apply Silvadene to the full-thickness ulceration to the bottom of the left foot underneath the left fifth toe.  - Lastly, wrap the bilateral lower extremities with 4x4 gauze, ABD pads and kerlix and then secure with tape.   - Please have patient ambulating in bilateral surgical shoes at all times during ambulation. Assessment: 40 y/o female seen by podiatry at bedside in the ICU for the followin.) Full Thickness Fibronecrotic Ulcer of Right fifth digit  2.) Full Thickness Fibronecrotic Ulcer with exposed peroneal tendon, lateral aspect RLE  3.) Full thickness ulcer with exposed Achilles tendon LT  4.) Full thickness ulcer to lateral malleolus, LLE  5) Full thickness ulceration, dorsum of left 1st MTPJ  6) Partial Thickness ulcer 4th interspace, Lt foot  7) Full thickness ulceration, upper 1/3 aspect of left leg at level of fibular notch  8) Non gradable Eschar of 3rd digit, Lt foot  9) Multiple hyperpigmented scar-like lesions scattered all over LE, bl  10) Serous blister, medial malleolus L side  11) Full thickness ulceration, left foot plantar sub metatarsal 5 region  12) Pain in B/L Lower Extremities     Plan:  - Chart reviewed and patient evaluated by Podiatry team.  - X-rays of the b/l LEs reviewed at this time. Case discussed with radiologist in depth. Please note oval radiolucencies visualized in the b/l LEs X-rays are due to air in exposed open wounds.   - Final wound Cx. left 1st MTPJ full-thickness ulceration, reviewed  - All wounds to b/l LEs irrigated with copious amounts of sterile saline.  - SSD and adaptic applied over the right dorsolateral forefoot ulceration.   - Alleyvn pad and SSD applied over exposed peroneal tendons to RLE.   - Adaptic applied over exposed Achilles tendon of LLE.   - Silvadene applied to wounds of L 3rd digit and L lateral malleolus ulceration.  - Silvadene applied to full-thickness ulceration left foot submet 5 region.   - The b/l feet and ankles were wrapped with 4x4 gauze, ABD pad and kerlix, then secured with tape.   - Patient's right and left heels to be offloaded in b/l Z-flex boots to be worn at all times when patient bedbound.  - Vascular Consult, appreciated.   - Discussed case with ID department given purulence exuded from full thickness ulceration to left 1st MPJ and that deep wound cx was taken, Abx per ID appreciated. Antibiotic regimen per ID appreciated.  - Discussed case with Medicine department as well now that patient on Med/Surg inpatient unit.  - Discussed findings of full-thickness ulceration at level of left fibular notch with Vascular surgery and Wound care nursing departments. Local wound care of full-thickness ulceration at level of left fibular notch per Wound care nursing department in addition to care of patient's sacral ulceration, appreciated.  - No podiatric surgical intervention necessary at this time as no abscess collections found to b/l lower extremities   - At this time, podiatry will provide supportive, and noninvasive wound care to the wounds, b/l lower extremities, given the fact that the patient is deemed in non stable medically and at risk for acute decompensation.  - When patient is deemed stable for discharge by all other medical specialties, patient instructed to f/u at the [facility] for continued podiatric care. Daily local wound care instructions outlined below.   - Spoke with Dr. Arrieta regarding exposed tendons, Plastics recommends aggressive wound care, optimization of nutritional status, and physical therapy. Once patient is medically optimized for plastics intervention, he follow up with pt.   - Patient to be discharged with b/l surgical shoes for ambulation upon future discharge.  - Nutrition supplementation per Dietician given albumin level decreased, appreciated.   - Will continue to check patient's chart given patient nonverbal.  - Podiatry will follow the case closely while in house.     DAILY LOCAL WOUND CARE INSTRUCTIONS  Please irrigate all wounds to b/l LEs copious amounts of sterile saline.  - Apply Silvadene and adaptic over the right dorsolateral forefoot ulceration.   - Apply Alleyvn pad and SSD over exposed peroneal tendons to right leg.   - Apply Adaptic over exposed Achilles tendon of Left lower extremity.   - Apply Silvadene to wounds of Left 3rd digit and Left lateral malleolus ulceration.  - Apply Silvadene to the full-thickness ulceration to the bottom of the left foot underneath the left fifth toe.  - Lastly, wrap the bilateral lower extremities with 4x4 gauze, ABD pads and kerlix and then secure with tape.   - Please have patient ambulating in bilateral surgical shoes at all times during ambulation.

## 2020-07-31 NOTE — DISCHARGE NOTE PROVIDER - INSTRUCTIONS
pt on dysphagia diet with honey thick liquids.  continue peg feeds per nutritionist until fully able to tolerate caloric intake.

## 2020-07-31 NOTE — PROGRESS NOTE ADULT - PROVIDER SPECIALTY LIST ADULT
Cardiology
Critical Care
Gastroenterology
Hospitalist
Infectious Disease
Nephrology
Podiatry
Pulmonology
Surgery
Thoracic Surgery
Vascular Surgery
Infectious Disease
Cardiology
Thoracic Surgery
Thoracic Surgery
Cardiology
Gastroenterology
Hospitalist
Nephrology
Podiatry
Podiatry
Critical Care
Cardiology
Critical Care

## 2020-07-31 NOTE — PROGRESS NOTE ADULT - ASSESSMENT
PROBLEMS:    Acute respiratory failure w/ hypoxia s/p tracheostomy 7/13, s/p PEG tube 7/16   Loculated left pleural effusion  S/p MRSA bacteremia  S/p Fungemia  Prob asiration pneumonia w/ GNR  Multiple ulcerations of b/l UE and LE. necrotic 5th toe  Anxiety d/o  Weight loss  Anemia, normocytic   Lupus/ RA/ Raynaud  NATALIA, resolved   S/p sepsis. POA. now resolved    PLAN:    PULMONARY BETTER  TRACH SUCTION  S/P REMOVAL OF STICHES  IV CEFEPIME  FU PLEURAL EFFUSION CLINICALLY  SUPPORTIVE CARE  DVT PROPHYLASIX

## 2020-10-23 ENCOUNTER — EMERGENCY (EMERGENCY)
Facility: HOSPITAL | Age: 40
LOS: 1 days | Discharge: HOME | End: 2020-10-23
Attending: EMERGENCY MEDICINE | Admitting: EMERGENCY MEDICINE
Payer: MEDICAID

## 2020-10-23 VITALS
TEMPERATURE: 97 F | HEIGHT: 65 IN | DIASTOLIC BLOOD PRESSURE: 71 MMHG | WEIGHT: 89.95 LBS | SYSTOLIC BLOOD PRESSURE: 144 MMHG | RESPIRATION RATE: 18 BRPM

## 2020-10-23 VITALS
SYSTOLIC BLOOD PRESSURE: 138 MMHG | OXYGEN SATURATION: 98 % | DIASTOLIC BLOOD PRESSURE: 68 MMHG | RESPIRATION RATE: 16 BRPM | TEMPERATURE: 98 F | HEART RATE: 87 BPM

## 2020-10-23 DIAGNOSIS — Z98.890 OTHER SPECIFIED POSTPROCEDURAL STATES: Chronic | ICD-10-CM

## 2020-10-23 DIAGNOSIS — K80.20 CALCULUS OF GALLBLADDER WITHOUT CHOLECYSTITIS WITHOUT OBSTRUCTION: Chronic | ICD-10-CM

## 2020-10-23 DIAGNOSIS — Z98.51 TUBAL LIGATION STATUS: Chronic | ICD-10-CM

## 2020-10-23 DIAGNOSIS — Z93.0 TRACHEOSTOMY STATUS: ICD-10-CM

## 2020-10-23 DIAGNOSIS — Z93.1 GASTROSTOMY STATUS: Chronic | ICD-10-CM

## 2020-10-23 DIAGNOSIS — Z87.19 PERSONAL HISTORY OF OTHER DISEASES OF THE DIGESTIVE SYSTEM: Chronic | ICD-10-CM

## 2020-10-23 PROCEDURE — 10060 I&D ABSCESS SIMPLE/SINGLE: CPT

## 2020-10-23 PROCEDURE — 87205 SMEAR GRAM STAIN: CPT

## 2020-10-23 PROCEDURE — 99283 EMERGENCY DEPT VISIT LOW MDM: CPT

## 2020-10-23 PROCEDURE — 87186 SC STD MICRODIL/AGAR DIL: CPT

## 2020-10-23 PROCEDURE — 87070 CULTURE OTHR SPECIMN AEROBIC: CPT

## 2020-10-23 PROCEDURE — 99285 EMERGENCY DEPT VISIT HI MDM: CPT | Mod: 25

## 2020-10-23 PROCEDURE — 87075 CULTR BACTERIA EXCEPT BLOOD: CPT

## 2020-10-23 NOTE — ED PROVIDER NOTE - SKIN, MLM
Skin normal color for race, warm, dry and skin breakdown unkempt abscess left arm fluctuant not red nor hot

## 2020-10-23 NOTE — ED PROVIDER NOTE - PATIENT PORTAL LINK FT
You can access the FollowMyHealth Patient Portal offered by [facility] by registering at the following website: [email or web address]. By joining [employer]’s FollowMyHealth portal, you will also be able to view your health information using other applications (apps) compatible with our system.

## 2020-10-23 NOTE — ED PROVIDER NOTE - NSFOLLOWUPINSTRUCTIONS_ED_ALL_ED_FT
You had an I and D of Abscesss with culture sent to lab for evaluation  the wound drained a large volume of purulent drainage. there is now packing placed  the packgin should be removed and replaced the wound should be soaked and cleaned starting tomorrow then daily  your trachostomy was removed by dr pasquale lopez per ent

## 2020-10-23 NOTE — ED PROVIDER NOTE - ENMT, MLM
Airway patent, Nasal mucosa clear. Mouth with normal mucosa. pt has capped trach holding a piece of bread no distress plesant

## 2020-10-23 NOTE — ED ADULT NURSE NOTE - PMH
Rheumatoid arthritis    Sepsis     Aphonia    Bacteremia    COPD (chronic obstructive pulmonary disease)    Depression    Epilepsy    GERD (gastroesophageal reflux disease)    HTN (hypertension)    Raynaud's syndrome without gangrene    Rheumatoid arthritis    Sepsis    Systemic lupus erythematosus

## 2020-10-23 NOTE — ED ADULT TRIAGE NOTE - CHIEF COMPLAINT QUOTE
pt BIB ems for removal of trach, per patient attempt was made at [facility] to remove trach w/o success yesterday. Also complains of abscess to LUE

## 2020-10-23 NOTE — ED ADULT NURSE NOTE - PSH
H/O tracheostomy     H/O tracheostomy    S/P percutaneous endoscopic gastrostomy (PEG) tube placement

## 2020-10-23 NOTE — CONSULT NOTE ADULT - SUBJECTIVE AND OBJECTIVE BOX
Date/Time Patient Seen:  		  Referring MD:   Data Reviewed	       Patient is a 40y old  Female who presents with a chief complaint of     Subjective/HPI  in bed  seen and examined  vs and meds reviewed  er provider note reviewed  · Chief Complaint: The patient is a 40y Female complaining of needing trach removal and having an abscess  · HPI Objective Statement: pt is a 41 yo  f who is resident of [facility]. she is there because a few months ago develop sepsis septic shock and respiratory failure she had a trach and peg due to protracted course.  she has hx of ra and seizure do. she has done well and is now recovered enough to be on solid food  and her trach is capped.  while at the [facility] the md there tried to remove it but could not stating it was "stuck". not wanting to cause harm pt was sent to er for trach removal  	pt also complains of an abscess on her left arm by her delt area for which she was on days of antibiotics. she is asking for an I an d  she is a former smoker of pot      drug use  smoker  trach dep  IVDA hx  skin infections  connective tissue disease       PAST MEDICAL & SURGICAL HISTORY:  GERD (gastroesophageal reflux disease)    Epilepsy    Depression    COPD (chronic obstructive pulmonary disease)    HTN (hypertension)    Sepsis    Rheumatoid arthritis    H/O tracheostomy          Medication list         MEDICATIONS  (STANDING):    MEDICATIONS  (PRN):         Vitals log        ICU Vital Signs Last 24 Hrs  T(C): 36.2 (23 Oct 2020 16:27), Max: 36.2 (23 Oct 2020 16:27)  T(F): 97.1 (23 Oct 2020 16:27), Max: 97.1 (23 Oct 2020 16:27)  HR: --  BP: 144/71 (23 Oct 2020 16:27) (144/71 - 144/71)  BP(mean): --  ABP: --  ABP(mean): --  RR: 18 (23 Oct 2020 16:27) (18 - 18)  SpO2: --           Input and Output:  I&O's Detail      Lab Data                  Review of Systems	  trach  anxious    Objective     Physical Examination    heart s1s2  lung dec BS  abd soft  head nc  trach in place  head at  verbal  poor dentition      Pertinent Lab findings & Imaging      Salgado:  NO   Adequate UO     I&O's Detail           Discussed with:     Cultures:	        Radiology      old records reviewed

## 2020-10-23 NOTE — CONSULT NOTE ADULT - PROBLEM SELECTOR RECOMMENDATION 9
trach in place  capped around the clock  ready for decannulation   pt has cuffed trach  had difficulty taking the trach out  ENT will assess pt in ED - spoke with Dr Byrd earlier - he will come and assess pt in ED - after office hours  STSI - skin abscess - ER MD will assess   plan is for pt to return to [facility] after ENT and ER MD assessment and hopefully decannulation
trach removed

## 2020-10-23 NOTE — ED ADULT NURSE REASSESSMENT NOTE - NS ED NURSE REASSESS COMMENT FT1
Report given to Hattie @ [facility] no need for any covid testing to return back to facility stated she had one already this week

## 2020-10-23 NOTE — ED PROVIDER NOTE - PROGRESS NOTE DETAILS
pulm dr hall adv dr giordano (ent) will come to remove trach) pt had I and d drained copious volumes of purlent liquid packing placed jaylin well  dr giordano ent in er dr giordano successfully removed the trach at bedside reports no complications no imaging needed  will dc pt

## 2020-10-23 NOTE — ED ADULT NURSE NOTE - NSIMPLEMENTINTERV_GEN_ALL_ED
Implemented All Fall Risk Interventions:  [place] to call system. Call bell, personal items and telephone within reach. Instruct patient to call for assistance. Room bathroom lighting operational. Non-slip footwear when patient is off stretcher. Physically safe environment: no spills, clutter or unnecessary equipment. Stretcher in lowest position, wheels locked, appropriate side rails in place. Provide visual cue, wrist band, yellow gown, etc. Monitor gait and stability. Monitor for mental status changes and reorient to person, place, and time. Review medications for side effects contributing to fall risk. Reinforce activity limits and safety measures with patient and family.

## 2020-10-23 NOTE — ED ADULT NURSE NOTE - OBJECTIVE STATEMENT
received pt in bed #10b Pt A&O BIBA from [place] attempt was made to remove trach w/o success yesterday. Also complains of abscess to LUE. Pt w/ no complaints Left upper arm abscess noted

## 2020-10-23 NOTE — CONSULT NOTE ADULT - SUBJECTIVE AND OBJECTIVE BOX
On exam thin neck with trach in place with inner cannula inner cannula removed and trach suctioned clear cuff inflated and deflated multiple times and then trach was removed with minimal difficulty and no bleeding dressing of xeroform gauze telfa and 4x4 applied.  tolerated well

## 2020-10-23 NOTE — ED PROVIDER NOTE - CARE PROVIDER_API CALL
Trever Byrd)  Otolaryngology  [address], [address]  [place] [address]  Phone: [phone]  Fax: [phone]  Follow Up Time:

## 2020-10-23 NOTE — CONSULT NOTE ADULT - CONSULT REASON
pt with 3 monts of tracheostomy  As far as pateint knows trach never changed. has been deflated for a while. able to be removed vrom pulmonary standpoint but was unable to remove

## 2020-10-23 NOTE — ED PROVIDER NOTE - MUSCULOSKELETAL, MLM
contracted hands diffuse muscle loss skin breakdown on arms legs covered in dressings pt has fingernails that are unkempt

## 2020-10-23 NOTE — ED ADULT NURSE NOTE - CHIEF COMPLAINT QUOTE
pt BIB ems for removal of trach, per patient attempt was made at [facility] to remove trach w/o success yesterday. Also complains of abscess to LUE

## 2020-10-23 NOTE — ED ADULT NURSE REASSESSMENT NOTE - NS ED NURSE REASSESS COMMENT FT1
trach removed by Dr Byrd & abscess drained & dressed by Dr Neal Pt to be d/c'd Transportation called

## 2020-10-23 NOTE — ED PROVIDER NOTE - OBJECTIVE STATEMENT
pt is a 39 yo  f who is resident of [facility]. she is there because a few months ago develop sepsis septic shock and respiratory failure she had a trach and peg due to protracted course.  she has hx of ra and seizure do. she has done well and is now recovered enough to be on solid food  and her trach is capped.  while at the [facility] the md there tried to remove it but could not stating it was "stuck". not wanting to cause harm pt was sent to er for trach removal  pt also complains of an abscess on her left arm by her delt area for which she was on days of antibiotics. she is asking for an I an d  she is a former smoker of pot

## 2020-10-25 LAB
-  AMPICILLIN/SULBACTAM: SIGNIFICANT CHANGE UP
-  CEFAZOLIN: SIGNIFICANT CHANGE UP
-  CLINDAMYCIN: SIGNIFICANT CHANGE UP
-  DAPTOMYCIN: SIGNIFICANT CHANGE UP
-  ERYTHROMYCIN: SIGNIFICANT CHANGE UP
-  GENTAMICIN: SIGNIFICANT CHANGE UP
-  LINEZOLID: SIGNIFICANT CHANGE UP
-  OXACILLIN: SIGNIFICANT CHANGE UP
-  PENICILLIN: SIGNIFICANT CHANGE UP
-  RIFAMPIN: SIGNIFICANT CHANGE UP
-  TETRACYCLINE: SIGNIFICANT CHANGE UP
-  TRIMETHOPRIM/SULFAMETHOXAZOLE: SIGNIFICANT CHANGE UP
-  VANCOMYCIN: SIGNIFICANT CHANGE UP
METHOD TYPE: SIGNIFICANT CHANGE UP

## 2020-10-26 NOTE — ED POST DISCHARGE NOTE - DETAILS
unable to contact patient, patient lives at [facility], unable to reach nurse, left call back number doxy sent to pharmacy spoke to nurse mariann advised on + wound cx results. advised doxy sent to pharmacy and pt needs prompt pmd follow up. all questions answered.

## 2020-10-28 LAB
CULTURE RESULTS: SIGNIFICANT CHANGE UP
ORGANISM # SPEC MICROSCOPIC CNT: SIGNIFICANT CHANGE UP
ORGANISM # SPEC MICROSCOPIC CNT: SIGNIFICANT CHANGE UP
SPECIMEN SOURCE: SIGNIFICANT CHANGE UP

## 2021-01-11 NOTE — PRE-OP CHECKLIST - NSBLOODTRANS_GEN_A_CORE_SIUH
"Preadmit appointment: \" Preparing for your Procedure information\" sheet given to patient with verbal and written instructions. Patient instructed to continue prescribed medications through the day before surgery, instructed to take the following medications the day of surgery per anesthesia protocol: Percocet PRN, Zoloft.  Pt will stop Aspirin 5 days before.  Pt will stop meloxicam per instructions from Dr Domingo's office.   Pt states, \"no issues with anesthesia\".  New Fasting guidelines handout given to Pt with Dr Domingo's NPO 8 hours prior to surgery and 3 hour cutoff for clear liquids.  All Pt's questions and concerns answered.  COVID test 1/18.  No s/s of a UTI  " no...

## 2021-07-29 ENCOUNTER — TRANSCRIPTION ENCOUNTER (OUTPATIENT)
Age: 41
End: 2021-07-29

## 2021-10-01 ENCOUNTER — OUTPATIENT (OUTPATIENT)
Dept: OUTPATIENT SERVICES | Facility: HOSPITAL | Age: 41
LOS: 1 days | End: 2021-10-01
Payer: MEDICAID

## 2021-10-01 DIAGNOSIS — Z98.51 TUBAL LIGATION STATUS: Chronic | ICD-10-CM

## 2021-10-01 DIAGNOSIS — K80.20 CALCULUS OF GALLBLADDER WITHOUT CHOLECYSTITIS WITHOUT OBSTRUCTION: Chronic | ICD-10-CM

## 2021-10-01 DIAGNOSIS — Z87.19 PERSONAL HISTORY OF OTHER DISEASES OF THE DIGESTIVE SYSTEM: Chronic | ICD-10-CM

## 2021-10-01 PROCEDURE — G9005: CPT

## 2021-10-11 ENCOUNTER — INPATIENT (INPATIENT)
Facility: HOSPITAL | Age: 41
LOS: 15 days | Discharge: HOME | DRG: 950 | End: 2021-10-27
Attending: HOSPITALIST | Admitting: HOSPITALIST
Payer: MEDICAID

## 2021-10-11 VITALS — WEIGHT: 110.01 LBS | HEIGHT: 65 IN

## 2021-10-11 DIAGNOSIS — Z98.51 TUBAL LIGATION STATUS: Chronic | ICD-10-CM

## 2021-10-11 DIAGNOSIS — K80.20 CALCULUS OF GALLBLADDER WITHOUT CHOLECYSTITIS WITHOUT OBSTRUCTION: Chronic | ICD-10-CM

## 2021-10-11 DIAGNOSIS — Z87.19 PERSONAL HISTORY OF OTHER DISEASES OF THE DIGESTIVE SYSTEM: Chronic | ICD-10-CM

## 2021-10-11 LAB
ALBUMIN SERPL ELPH-MCNC: 2.5 G/DL — LOW (ref 3.3–5)
ALP SERPL-CCNC: 60 U/L — SIGNIFICANT CHANGE UP (ref 40–120)
ALT FLD-CCNC: 13 U/L — SIGNIFICANT CHANGE UP (ref 12–78)
ANION GAP SERPL CALC-SCNC: 15 MMOL/L — SIGNIFICANT CHANGE UP (ref 5–17)
APPEARANCE UR: ABNORMAL
AST SERPL-CCNC: 23 U/L — SIGNIFICANT CHANGE UP (ref 15–37)
BASOPHILS # BLD AUTO: 0.02 K/UL — SIGNIFICANT CHANGE UP (ref 0–0.2)
BASOPHILS NFR BLD AUTO: 0.3 % — SIGNIFICANT CHANGE UP (ref 0–2)
BILIRUB SERPL-MCNC: 0.2 MG/DL — SIGNIFICANT CHANGE UP (ref 0.2–1.2)
BILIRUB UR-MCNC: NEGATIVE — SIGNIFICANT CHANGE UP
BUN SERPL-MCNC: 97 MG/DL — HIGH (ref 7–23)
CALCIUM SERPL-MCNC: 7.3 MG/DL — LOW (ref 8.5–10.1)
CHLORIDE SERPL-SCNC: 101 MMOL/L — SIGNIFICANT CHANGE UP (ref 96–108)
CK SERPL-CCNC: 142 U/L — SIGNIFICANT CHANGE UP (ref 26–192)
CO2 SERPL-SCNC: 17 MMOL/L — LOW (ref 22–31)
COLOR SPEC: YELLOW — SIGNIFICANT CHANGE UP
CREAT SERPL-MCNC: 6.72 MG/DL — HIGH (ref 0.5–1.3)
DIFF PNL FLD: ABNORMAL
EOSINOPHIL # BLD AUTO: 0.1 K/UL — SIGNIFICANT CHANGE UP (ref 0–0.5)
EOSINOPHIL NFR BLD AUTO: 1.6 % — SIGNIFICANT CHANGE UP (ref 0–6)
GLUCOSE SERPL-MCNC: 89 MG/DL — SIGNIFICANT CHANGE UP (ref 70–99)
GLUCOSE UR QL: NEGATIVE MG/DL — SIGNIFICANT CHANGE UP
HCT VFR BLD CALC: 23.1 % — LOW (ref 34.5–45)
HGB BLD-MCNC: 7.9 G/DL — LOW (ref 11.5–15.5)
IMM GRANULOCYTES NFR BLD AUTO: 0.5 % — SIGNIFICANT CHANGE UP (ref 0–1.5)
KETONES UR-MCNC: NEGATIVE — SIGNIFICANT CHANGE UP
LEUKOCYTE ESTERASE UR-ACNC: ABNORMAL
LIDOCAIN IGE QN: 256 U/L — SIGNIFICANT CHANGE UP (ref 73–393)
LYMPHOCYTES # BLD AUTO: 1.1 K/UL — SIGNIFICANT CHANGE UP (ref 1–3.3)
LYMPHOCYTES # BLD AUTO: 17.7 % — SIGNIFICANT CHANGE UP (ref 13–44)
MCHC RBC-ENTMCNC: 28.7 PG — SIGNIFICANT CHANGE UP (ref 27–34)
MCHC RBC-ENTMCNC: 34.2 GM/DL — SIGNIFICANT CHANGE UP (ref 32–36)
MCV RBC AUTO: 84 FL — SIGNIFICANT CHANGE UP (ref 80–100)
MONOCYTES # BLD AUTO: 0.6 K/UL — SIGNIFICANT CHANGE UP (ref 0–0.9)
MONOCYTES NFR BLD AUTO: 9.6 % — SIGNIFICANT CHANGE UP (ref 2–14)
NEUTROPHILS # BLD AUTO: 4.37 K/UL — SIGNIFICANT CHANGE UP (ref 1.8–7.4)
NEUTROPHILS NFR BLD AUTO: 70.3 % — SIGNIFICANT CHANGE UP (ref 43–77)
NITRITE UR-MCNC: NEGATIVE — SIGNIFICANT CHANGE UP
PH UR: 5 — SIGNIFICANT CHANGE UP (ref 5–8)
PLATELET # BLD AUTO: 109 K/UL — LOW (ref 150–400)
POTASSIUM SERPL-MCNC: 3.9 MMOL/L — SIGNIFICANT CHANGE UP (ref 3.5–5.3)
POTASSIUM SERPL-SCNC: 3.9 MMOL/L — SIGNIFICANT CHANGE UP (ref 3.5–5.3)
PROT SERPL-MCNC: 7.4 GM/DL — SIGNIFICANT CHANGE UP (ref 6–8.3)
PROT UR-MCNC: 500 MG/DL
RBC # BLD: 2.75 M/UL — LOW (ref 3.8–5.2)
RBC # FLD: 15.3 % — HIGH (ref 10.3–14.5)
SODIUM SERPL-SCNC: 133 MMOL/L — LOW (ref 135–145)
SP GR SPEC: 1.01 — SIGNIFICANT CHANGE UP (ref 1.01–1.02)
UROBILINOGEN FLD QL: NEGATIVE MG/DL — SIGNIFICANT CHANGE UP
WBC # BLD: 6.22 K/UL — SIGNIFICANT CHANGE UP (ref 3.8–10.5)
WBC # FLD AUTO: 6.22 K/UL — SIGNIFICANT CHANGE UP (ref 3.8–10.5)

## 2021-10-11 PROCEDURE — 99285 EMERGENCY DEPT VISIT HI MDM: CPT

## 2021-10-11 PROCEDURE — 93010 ELECTROCARDIOGRAM REPORT: CPT

## 2021-10-11 RX ORDER — CEFTRIAXONE 500 MG/1
1000 INJECTION, POWDER, FOR SOLUTION INTRAMUSCULAR; INTRAVENOUS ONCE
Refills: 0 | Status: COMPLETED | OUTPATIENT
Start: 2021-10-11 | End: 2021-10-11

## 2021-10-11 RX ORDER — SODIUM CHLORIDE 9 MG/ML
1500 INJECTION INTRAMUSCULAR; INTRAVENOUS; SUBCUTANEOUS ONCE
Refills: 0 | Status: COMPLETED | OUTPATIENT
Start: 2021-10-11 | End: 2021-10-11

## 2021-10-11 RX ADMIN — SODIUM CHLORIDE 1500 MILLILITER(S): 9 INJECTION INTRAMUSCULAR; INTRAVENOUS; SUBCUTANEOUS at 22:36

## 2021-10-11 NOTE — ED PROVIDER NOTE - NSICDXPASTMEDICALHX_GEN_ALL_CORE_FT
PAST MEDICAL HISTORY:  Aphonia     Bacteremia     COPD (chronic obstructive pulmonary disease)     Depression     Epilepsy     GERD (gastroesophageal reflux disease)     H/O Raynaud's syndrome     Heroin abuse     HTN (hypertension)     Lupus     Raynaud's syndrome without gangrene     Rheumatoid arthritis     Rheumatoid arthritis     Sepsis     Smoker     Systemic lupus erythematosus

## 2021-10-11 NOTE — ED ADULT NURSE NOTE - NSICDXPASTSURGICALHX_GEN_ALL_CORE_FT
PAST SURGICAL HISTORY:  Gall bladder stones     H/O appendicitis     H/O tracheostomy     H/O tubal ligation     S/P percutaneous endoscopic gastrostomy (PEG) tube placement

## 2021-10-11 NOTE — ED ADULT NURSE NOTE - OBJECTIVE STATEMENT
Patient was sent by her rheumatologist for elevated creatinine and low hemoglobin.  Patient states her urine has been very dark and feels like there is blood in it.  Extensive medical hx.

## 2021-10-11 NOTE — ED PROVIDER NOTE - NSICDXPASTSURGICALHX_GEN_ALL_CORE_FT
Floor called to come down and get bedside report per hospital policy, unit secretary reports Kolton Loving RN will be coming. Pt lying on stretcher, reports no pain at this time, no distress noted, Pt remains on monitor and call bell within reach, denies needing bathroom,  room safety check completed, informed of status, awaiting admission, menu given for pt to order, will continue to monitor. PAST SURGICAL HISTORY:  Gall bladder stones     H/O appendicitis     H/O tracheostomy     H/O tubal ligation     S/P percutaneous endoscopic gastrostomy (PEG) tube placement

## 2021-10-11 NOTE — ED PROVIDER NOTE - OBJECTIVE STATEMENT
38 yo female with a pmhx of lupus, RA, Reynard's, epilepsy (last seizure 8 yrs ago), IVDA p/w weakness for the past week, dark urine and elevated Cr from labs taken on 10/07/21 with Cr of 5.8.  Her baseline Cr is <1.  Pt denies cough, sore throat, chest pain or SOB.  Pt notes polydipsia and polyuria.  She is on Plaquenil.

## 2021-10-11 NOTE — ED PROVIDER NOTE - CLINICAL SUMMARY MEDICAL DECISION MAKING FREE TEXT BOX
Pt p/w NATALIA on outpatient labs with dramatic increase in Cr and dark urine.  Plan: IV fluid, CBC, CMP, UA, CTAP

## 2021-10-11 NOTE — ED ADULT TRIAGE NOTE - CHIEF COMPLAINT QUOTE
pt presents to ED c/o abnormal labs including kidney function, c/o blood in urine. hx lupus, appears pale in triage.

## 2021-10-12 DIAGNOSIS — N17.9 ACUTE KIDNEY FAILURE, UNSPECIFIED: ICD-10-CM

## 2021-10-12 LAB
ANION GAP SERPL CALC-SCNC: 15 MMOL/L — SIGNIFICANT CHANGE UP (ref 5–17)
BUN SERPL-MCNC: 106 MG/DL — HIGH (ref 7–23)
CALCIUM SERPL-MCNC: 7 MG/DL — LOW (ref 8.5–10.1)
CHLORIDE SERPL-SCNC: 104 MMOL/L — SIGNIFICANT CHANGE UP (ref 96–108)
CHOLEST SERPL-MCNC: 137 MG/DL — SIGNIFICANT CHANGE UP
CO2 SERPL-SCNC: 15 MMOL/L — LOW (ref 22–31)
COVID-19 SPIKE DOMAIN AB INTERP: NEGATIVE — SIGNIFICANT CHANGE UP
COVID-19 SPIKE DOMAIN ANTIBODY RESULT: 0.4 U/ML — SIGNIFICANT CHANGE UP
CREAT SERPL-MCNC: 6.51 MG/DL — HIGH (ref 0.5–1.3)
CRP SERPL-MCNC: 76 MG/L — HIGH
GLUCOSE SERPL-MCNC: 108 MG/DL — HIGH (ref 70–99)
HCT VFR BLD CALC: 20 % — CRITICAL LOW (ref 34.5–45)
HDLC SERPL-MCNC: 14 MG/DL — LOW
HGB BLD-MCNC: 6.6 G/DL — CRITICAL LOW (ref 11.5–15.5)
LIPID PNL WITH DIRECT LDL SERPL: SIGNIFICANT CHANGE UP MG/DL
MCHC RBC-ENTMCNC: 28.3 PG — SIGNIFICANT CHANGE UP (ref 27–34)
MCHC RBC-ENTMCNC: 33 GM/DL — SIGNIFICANT CHANGE UP (ref 32–36)
MCV RBC AUTO: 85.8 FL — SIGNIFICANT CHANGE UP (ref 80–100)
NON HDL CHOLESTEROL: 123 MG/DL — SIGNIFICANT CHANGE UP
PHOSPHATE SERPL-MCNC: 7.7 MG/DL — HIGH (ref 2.5–4.5)
PLATELET # BLD AUTO: 82 K/UL — LOW (ref 150–400)
POST UNIT NUMBER: SIGNIFICANT CHANGE UP
POTASSIUM SERPL-MCNC: 4.5 MMOL/L — SIGNIFICANT CHANGE UP (ref 3.5–5.3)
POTASSIUM SERPL-SCNC: 4.5 MMOL/L — SIGNIFICANT CHANGE UP (ref 3.5–5.3)
RBC # BLD: 2.33 M/UL — LOW (ref 3.8–5.2)
RBC # FLD: 15.6 % — HIGH (ref 10.3–14.5)
SARS-COV-2 IGG+IGM SERPL QL IA: 0.4 U/ML — SIGNIFICANT CHANGE UP
SARS-COV-2 IGG+IGM SERPL QL IA: NEGATIVE — SIGNIFICANT CHANGE UP
SARS-COV-2 RNA SPEC QL NAA+PROBE: SIGNIFICANT CHANGE UP
SODIUM SERPL-SCNC: 134 MMOL/L — LOW (ref 135–145)
TRIGL SERPL-MCNC: 470 MG/DL — HIGH
WBC # BLD: 4.22 K/UL — SIGNIFICANT CHANGE UP (ref 3.8–10.5)
WBC # FLD AUTO: 4.22 K/UL — SIGNIFICANT CHANGE UP (ref 3.8–10.5)

## 2021-10-12 PROCEDURE — 72195 MRI PELVIS W/O DYE: CPT

## 2021-10-12 PROCEDURE — 87493 C DIFF AMPLIFIED PROBE: CPT

## 2021-10-12 PROCEDURE — 87635 SARS-COV-2 COVID-19 AMP PRB: CPT

## 2021-10-12 PROCEDURE — 87077 CULTURE AEROBIC IDENTIFY: CPT

## 2021-10-12 PROCEDURE — U0003: CPT

## 2021-10-12 PROCEDURE — 86200 CCP ANTIBODY: CPT

## 2021-10-12 PROCEDURE — 36430 TRANSFUSION BLD/BLD COMPNT: CPT

## 2021-10-12 PROCEDURE — 82570 ASSAY OF URINE CREATININE: CPT

## 2021-10-12 PROCEDURE — 86769 SARS-COV-2 COVID-19 ANTIBODY: CPT

## 2021-10-12 PROCEDURE — 86923 COMPATIBILITY TEST ELECTRIC: CPT

## 2021-10-12 PROCEDURE — 93970 EXTREMITY STUDY: CPT

## 2021-10-12 PROCEDURE — 81001 URINALYSIS AUTO W/SCOPE: CPT

## 2021-10-12 PROCEDURE — 99223 1ST HOSP IP/OBS HIGH 75: CPT

## 2021-10-12 PROCEDURE — 83516 IMMUNOASSAY NONANTIBODY: CPT

## 2021-10-12 PROCEDURE — 86235 NUCLEAR ANTIGEN ANTIBODY: CPT

## 2021-10-12 PROCEDURE — P9016: CPT

## 2021-10-12 PROCEDURE — 84156 ASSAY OF PROTEIN URINE: CPT

## 2021-10-12 PROCEDURE — 93306 TTE W/DOPPLER COMPLETE: CPT

## 2021-10-12 PROCEDURE — 86160 COMPLEMENT ANTIGEN: CPT

## 2021-10-12 PROCEDURE — 85652 RBC SED RATE AUTOMATED: CPT

## 2021-10-12 PROCEDURE — 86850 RBC ANTIBODY SCREEN: CPT

## 2021-10-12 PROCEDURE — 80307 DRUG TEST PRSMV CHEM ANLYZR: CPT

## 2021-10-12 PROCEDURE — 83970 ASSAY OF PARATHORMONE: CPT

## 2021-10-12 PROCEDURE — 77001 FLUOROGUIDE FOR VEIN DEVICE: CPT

## 2021-10-12 PROCEDURE — 77012 CT SCAN FOR NEEDLE BIOPSY: CPT

## 2021-10-12 PROCEDURE — 50200 RENAL BIOPSY PERQ: CPT | Mod: RT

## 2021-10-12 PROCEDURE — C1752: CPT

## 2021-10-12 PROCEDURE — 87150 DNA/RNA AMPLIFIED PROBE: CPT

## 2021-10-12 PROCEDURE — 36415 COLL VENOUS BLD VENIPUNCTURE: CPT

## 2021-10-12 PROCEDURE — 80061 LIPID PANEL: CPT

## 2021-10-12 PROCEDURE — 86039 ANTINUCLEAR ANTIBODIES (ANA): CPT

## 2021-10-12 PROCEDURE — 81025 URINE PREGNANCY TEST: CPT

## 2021-10-12 PROCEDURE — 85027 COMPLETE CBC AUTOMATED: CPT

## 2021-10-12 PROCEDURE — 80053 COMPREHEN METABOLIC PANEL: CPT

## 2021-10-12 PROCEDURE — 85014 HEMATOCRIT: CPT

## 2021-10-12 PROCEDURE — 94640 AIRWAY INHALATION TREATMENT: CPT

## 2021-10-12 PROCEDURE — 86140 C-REACTIVE PROTEIN: CPT

## 2021-10-12 PROCEDURE — 36558 INSERT TUNNELED CV CATH: CPT

## 2021-10-12 PROCEDURE — 86078 PHYS BLOOD BANK SERV REACTJ: CPT

## 2021-10-12 PROCEDURE — C1889: CPT

## 2021-10-12 PROCEDURE — 82310 ASSAY OF CALCIUM: CPT

## 2021-10-12 PROCEDURE — 36556 INSERT NON-TUNNEL CV CATH: CPT

## 2021-10-12 PROCEDURE — 80048 BASIC METABOLIC PNL TOTAL CA: CPT

## 2021-10-12 PROCEDURE — C1894: CPT

## 2021-10-12 PROCEDURE — 71250 CT THORAX DX C-: CPT

## 2021-10-12 PROCEDURE — 87507 IADNA-DNA/RNA PROBE TQ 12-25: CPT

## 2021-10-12 PROCEDURE — 86900 BLOOD TYPING SEROLOGIC ABO: CPT

## 2021-10-12 PROCEDURE — 80202 ASSAY OF VANCOMYCIN: CPT

## 2021-10-12 PROCEDURE — 71045 X-RAY EXAM CHEST 1 VIEW: CPT

## 2021-10-12 PROCEDURE — 83550 IRON BINDING TEST: CPT

## 2021-10-12 PROCEDURE — 85018 HEMOGLOBIN: CPT

## 2021-10-12 PROCEDURE — 97116 GAIT TRAINING THERAPY: CPT | Mod: GP

## 2021-10-12 PROCEDURE — 82728 ASSAY OF FERRITIN: CPT

## 2021-10-12 PROCEDURE — 99221 1ST HOSP IP/OBS SF/LOW 40: CPT

## 2021-10-12 PROCEDURE — 86255 FLUORESCENT ANTIBODY SCREEN: CPT

## 2021-10-12 PROCEDURE — C1769: CPT

## 2021-10-12 PROCEDURE — 80069 RENAL FUNCTION PANEL: CPT

## 2021-10-12 PROCEDURE — 85025 COMPLETE CBC W/AUTO DIFF WBC: CPT

## 2021-10-12 PROCEDURE — 87040 BLOOD CULTURE FOR BACTERIA: CPT

## 2021-10-12 PROCEDURE — C9113: CPT

## 2021-10-12 PROCEDURE — 86431 RHEUMATOID FACTOR QUANT: CPT

## 2021-10-12 PROCEDURE — 85730 THROMBOPLASTIN TIME PARTIAL: CPT

## 2021-10-12 PROCEDURE — 86225 DNA ANTIBODY NATIVE: CPT

## 2021-10-12 PROCEDURE — 93005 ELECTROCARDIOGRAM TRACING: CPT

## 2021-10-12 PROCEDURE — 80074 ACUTE HEPATITIS PANEL: CPT

## 2021-10-12 PROCEDURE — 84100 ASSAY OF PHOSPHORUS: CPT

## 2021-10-12 PROCEDURE — 83540 ASSAY OF IRON: CPT

## 2021-10-12 PROCEDURE — 86480 TB TEST CELL IMMUN MEASURE: CPT

## 2021-10-12 PROCEDURE — 74176 CT ABD & PELVIS W/O CONTRAST: CPT | Mod: 26,MA

## 2021-10-12 PROCEDURE — C1750: CPT

## 2021-10-12 PROCEDURE — 82306 VITAMIN D 25 HYDROXY: CPT

## 2021-10-12 PROCEDURE — U0005: CPT

## 2021-10-12 PROCEDURE — 86901 BLOOD TYPING SEROLOGIC RH(D): CPT

## 2021-10-12 PROCEDURE — 99261: CPT

## 2021-10-12 PROCEDURE — 97162 PT EVAL MOD COMPLEX 30 MIN: CPT | Mod: GP

## 2021-10-12 PROCEDURE — 83735 ASSAY OF MAGNESIUM: CPT

## 2021-10-12 PROCEDURE — 80076 HEPATIC FUNCTION PANEL: CPT

## 2021-10-12 PROCEDURE — 76937 US GUIDE VASCULAR ACCESS: CPT

## 2021-10-12 PROCEDURE — 85610 PROTHROMBIN TIME: CPT

## 2021-10-12 PROCEDURE — 86706 HEP B SURFACE ANTIBODY: CPT

## 2021-10-12 PROCEDURE — 12345: CPT | Mod: NC

## 2021-10-12 RX ORDER — HEPARIN SODIUM 5000 [USP'U]/ML
5000 INJECTION INTRAVENOUS; SUBCUTANEOUS EVERY 8 HOURS
Refills: 0 | Status: DISCONTINUED | OUTPATIENT
Start: 2021-10-12 | End: 2021-10-12

## 2021-10-12 RX ORDER — SODIUM CHLORIDE 9 MG/ML
1000 INJECTION, SOLUTION INTRAVENOUS
Refills: 0 | Status: DISCONTINUED | OUTPATIENT
Start: 2021-10-12 | End: 2021-10-16

## 2021-10-12 RX ORDER — ONDANSETRON 8 MG/1
4 TABLET, FILM COATED ORAL EVERY 8 HOURS
Refills: 0 | Status: DISCONTINUED | OUTPATIENT
Start: 2021-10-12 | End: 2021-10-27

## 2021-10-12 RX ORDER — SODIUM CHLORIDE 9 MG/ML
1000 INJECTION INTRAMUSCULAR; INTRAVENOUS; SUBCUTANEOUS
Refills: 0 | Status: DISCONTINUED | OUTPATIENT
Start: 2021-10-12 | End: 2021-10-12

## 2021-10-12 RX ORDER — COLLAGENASE CLOSTRIDIUM HIST. 250 UNIT/G
1 OINTMENT (GRAM) TOPICAL DAILY
Refills: 0 | Status: DISCONTINUED | OUTPATIENT
Start: 2021-10-12 | End: 2021-10-27

## 2021-10-12 RX ORDER — DIPHENHYDRAMINE HCL 50 MG
25 CAPSULE ORAL EVERY 4 HOURS
Refills: 0 | Status: DISCONTINUED | OUTPATIENT
Start: 2021-10-12 | End: 2021-10-27

## 2021-10-12 RX ORDER — LANOLIN ALCOHOL/MO/W.PET/CERES
3 CREAM (GRAM) TOPICAL AT BEDTIME
Refills: 0 | Status: DISCONTINUED | OUTPATIENT
Start: 2021-10-12 | End: 2021-10-27

## 2021-10-12 RX ORDER — HYDROMORPHONE HYDROCHLORIDE 2 MG/ML
0.5 INJECTION INTRAMUSCULAR; INTRAVENOUS; SUBCUTANEOUS ONCE
Refills: 0 | Status: DISCONTINUED | OUTPATIENT
Start: 2021-10-12 | End: 2021-10-12

## 2021-10-12 RX ORDER — INFLUENZA VIRUS VACCINE 15; 15; 15; 15 UG/.5ML; UG/.5ML; UG/.5ML; UG/.5ML
0.5 SUSPENSION INTRAMUSCULAR ONCE
Refills: 0 | Status: DISCONTINUED | OUTPATIENT
Start: 2021-10-12 | End: 2021-10-27

## 2021-10-12 RX ORDER — HYDROXYCHLOROQUINE SULFATE 200 MG
200 TABLET ORAL DAILY
Refills: 0 | Status: DISCONTINUED | OUTPATIENT
Start: 2021-10-12 | End: 2021-10-27

## 2021-10-12 RX ORDER — ACETAMINOPHEN 500 MG
650 TABLET ORAL EVERY 6 HOURS
Refills: 0 | Status: DISCONTINUED | OUTPATIENT
Start: 2021-10-12 | End: 2021-10-27

## 2021-10-12 RX ORDER — PANTOPRAZOLE SODIUM 20 MG/1
8 TABLET, DELAYED RELEASE ORAL
Qty: 80 | Refills: 0 | Status: DISCONTINUED | OUTPATIENT
Start: 2021-10-12 | End: 2021-10-14

## 2021-10-12 RX ORDER — GABAPENTIN 400 MG/1
100 CAPSULE ORAL THREE TIMES A DAY
Refills: 0 | Status: DISCONTINUED | OUTPATIENT
Start: 2021-10-12 | End: 2021-10-27

## 2021-10-12 RX ORDER — CEFTRIAXONE 500 MG/1
1000 INJECTION, POWDER, FOR SOLUTION INTRAMUSCULAR; INTRAVENOUS EVERY 24 HOURS
Refills: 0 | Status: COMPLETED | OUTPATIENT
Start: 2021-10-12 | End: 2021-10-18

## 2021-10-12 RX ADMIN — GABAPENTIN 100 MILLIGRAM(S): 400 CAPSULE ORAL at 04:40

## 2021-10-12 RX ADMIN — PANTOPRAZOLE SODIUM 10 MG/HR: 20 TABLET, DELAYED RELEASE ORAL at 19:01

## 2021-10-12 RX ADMIN — HEPARIN SODIUM 5000 UNIT(S): 5000 INJECTION INTRAVENOUS; SUBCUTANEOUS at 14:01

## 2021-10-12 RX ADMIN — CEFTRIAXONE 1000 MILLIGRAM(S): 500 INJECTION, POWDER, FOR SOLUTION INTRAMUSCULAR; INTRAVENOUS at 23:02

## 2021-10-12 RX ADMIN — GABAPENTIN 100 MILLIGRAM(S): 400 CAPSULE ORAL at 23:02

## 2021-10-12 RX ADMIN — Medication 0.1 MILLIGRAM(S): at 10:14

## 2021-10-12 RX ADMIN — Medication 650 MILLIGRAM(S): at 21:55

## 2021-10-12 RX ADMIN — GABAPENTIN 100 MILLIGRAM(S): 400 CAPSULE ORAL at 14:00

## 2021-10-12 RX ADMIN — Medication 100 MILLIGRAM(S): at 23:02

## 2021-10-12 RX ADMIN — HYDROMORPHONE HYDROCHLORIDE 0.5 MILLIGRAM(S): 2 INJECTION INTRAMUSCULAR; INTRAVENOUS; SUBCUTANEOUS at 04:38

## 2021-10-12 RX ADMIN — Medication 0.1 MILLIGRAM(S): at 17:57

## 2021-10-12 RX ADMIN — CEFTRIAXONE 1000 MILLIGRAM(S): 500 INJECTION, POWDER, FOR SOLUTION INTRAMUSCULAR; INTRAVENOUS at 00:15

## 2021-10-12 RX ADMIN — Medication 100 MILLIGRAM(S): at 14:00

## 2021-10-12 RX ADMIN — Medication 200 MILLIGRAM(S): at 14:00

## 2021-10-12 RX ADMIN — SODIUM CHLORIDE 100 MILLILITER(S): 9 INJECTION INTRAMUSCULAR; INTRAVENOUS; SUBCUTANEOUS at 03:00

## 2021-10-12 RX ADMIN — Medication 25 MILLIGRAM(S): at 21:55

## 2021-10-12 RX ADMIN — SODIUM CHLORIDE 100 MILLILITER(S): 9 INJECTION, SOLUTION INTRAVENOUS at 19:02

## 2021-10-12 NOTE — ED ADULT NURSE REASSESSMENT NOTE - NS ED NURSE REASSESS COMMENT FT1
Received patient from ANNIE Floyd. Patient is alert and oriented x3, able to make needs known. Patient denies any pain at this time. Patient in no acute distress. Will continue to monitor patient.

## 2021-10-12 NOTE — DIETITIAN INITIAL EVALUATION ADULT. - OTHER INFO
40 year old female w hx anemia, HTN, RA, SLE on hydroxychloroquine was told to go to ED for evaluation of elevated creatinine  Cr 10- was 5.8 where baseline < 1.0  Has followed up w rheum q 3 months  mobility has changed given bone on bone w knees and shoulders and wrists; not able to use crutches or a walker so has been using a wheelchair  Uses meloxicam 15 mg qd  last week she used aleve x 2 tabs but usually supplements w acetaminophen  due to chronic pain she was to start gabapentin 40 year old female w hx anemia, HTN, RA, SLE on hydroxychloroquine was told to go to ED for evaluation of elevated creatinine  Cr 10- was 5.8 where baseline < 1.0  Has followed up w rheum q 3 months  mobility has changed given bone on bone w knees and shoulders and wrists; not able to use crutches or a walker so has been using a wheelchair  Uses meloxicam 15 mg qd  last week she used aleve x 2 tabs but usually supplements w acetaminophen  due to chronic pain she was to start gabapentin  Visited with pt at bedside, pt was sleepy but amenable to answering questions.  Pt reports good PO intake prior to admit, however she does not give details.

## 2021-10-12 NOTE — CHART NOTE - NSCHARTNOTEFT_GEN_A_CORE
Notified by RN that patient had bloody BM  SHe completed her 1st Unit PRBC and due for a second unit  Keep NPO  Start Protonix drip  GI eval DR Aceves called in  FSOB  Repeat HH post-transfusion no

## 2021-10-12 NOTE — DIETITIAN INITIAL EVALUATION ADULT. - PERTINENT MEDS FT
MEDICATIONS  (STANDING):  cefTRIAXone Injectable. 1000 milliGRAM(s) IV Push every 24 hours  collagenase Ointment 1 Application(s) Topical daily  dextrose 5% 1000 milliLiter(s) (100 mL/Hr) IV Continuous <Continuous>  doxycycline hyclate Capsule 100 milliGRAM(s) Oral every 12 hours  gabapentin 100 milliGRAM(s) Oral three times a day  heparin   Injectable 5000 Unit(s) SubCutaneous every 8 hours  hydroxychloroquine 200 milliGRAM(s) Oral daily  influenza   Vaccine 0.5 milliLiter(s) IntraMuscular once    MEDICATIONS  (PRN):  acetaminophen   Tablet .. 650 milliGRAM(s) Oral every 6 hours PRN Temp greater or equal to 38C (100.4F), Mild Pain (1 - 3)  cloNIDine 0.1 milliGRAM(s) Oral every 8 hours PRN BP sys > 140 or diastolic > 100  melatonin 3 milliGRAM(s) Oral at bedtime PRN Insomnia  ondansetron Injectable 4 milliGRAM(s) IV Push every 8 hours PRN Nausea and/or Vomiting

## 2021-10-12 NOTE — DIETITIAN NUTRITION RISK NOTIFICATION - ADDITIONAL COMMENTS/DIETITIAN RECOMMENDATIONS
Maintain renal diet  Add Nepro 8 oz tid  Record PO intake in EMR after each meal (nursing.)   tray set-up  Monitor PO intake, tolerance, labs and weight.

## 2021-10-12 NOTE — DIETITIAN INITIAL EVALUATION ADULT. - PHYSICAL ASSESSMENT TRICEPS
Upper limb tension test radian negative.  Ulnar positive.    I was present and supervised Bhavani Iniguez PTA. Notes were reviewed and care agreed upon.      severe

## 2021-10-12 NOTE — DIETITIAN INITIAL EVALUATION ADULT. - MALNUTRITION
Pt meets criteria for severe protein-calorie malnutrition in context of chronic disease Patient meets criteria for severe protein-calorie malnutrition in context of chronic disease

## 2021-10-12 NOTE — ADVANCED PRACTICE NURSE CONSULT - ASSESSMENT
HPI: This is a 40 year old female that was admitted to the hospital on 10/12/2021 for renal failure.  PMH- anemia, HTN, RA, SLE on hydroxychloroquine was told to go to ED for evaluation of elevated creatinine  Cr 10- was 5.8 where baseline < 1.0  Has followed up w rheum q 3 months  mobility has changed given bone on bone w knees and shoulders and wrists; not able to use crutches or a walker so has been using a wheelchair  SOCIAL HX  former smoker  no alcohol  once a month marijuana (12 Oct 2021 02:15)    Consulted to unstageable pressure injury to coccyx. Patient presents on an low an AtmosAir 9000 MRS on her right side with heels elevated off mattress. Patient is able to make independent changes with her body repositioning in bed.  Coccyx assessed to have a full-thickness wound measuring 4mhx0xmr5.3cm.  Wound with 100% black eschar. No odor, periwound intact. Wound irrigated with normal saline. Collagenase applied to wound bed for enzymatic debridement and covered with foam dressing as cover dressing. Patient remains on her right side. Encouraging her to continue turning and positioning every 2 hours to allow her wound to continue to heal. Patient agreeable and receptive.  PAST MEDICAL & SURGICAL HISTORY:  Lupus  Rheumatoid arthritis  H/O Raynaud&#x27;s syndrome  Heroin abuse  Smoker, Rheumatoid arthritis  Sepsis  HTN (hypertension)  COPD (chronic obstructive pulmonary disease)  Depression, Epilepsy  GERD (gastroesophageal reflux disease)  Raynaud&#x27;s syndrome without gangrene  Bacteremia, Systemic lupus erythematosus  Aphonia, H/O tubal ligation  H/O appendicitis, Gall bladder stones  H/O tracheostomy  S/P percutaneous endoscopic gastrostomy (PEG) tube placement  REVIEW OF SYSTEMS  ENMT:	  Respiratory and Thorax: denies shortness of breath  Cardiovascular:	denies chest pain  Gastrointestinal:	  Genitourinary:	  Musculoskeletal:	  Neurological:	  Psychiatric:	  Hematology/Lymphatics:	  Endocrine:	  Allergic/Immunologic:	  MEDICATIONS  (STANDING):  cefTRIAXone Injectable. 1000 milliGRAM(s) IV Push every 24 hours  collagenase Ointment 1 Application(s) Topical daily  doxycycline hyclate Capsule 100 milliGRAM(s) Oral every 12 hours  gabapentin 100 milliGRAM(s) Oral three times a day  heparin   Injectable 5000 Unit(s) SubCutaneous every 8 hours  hydroxychloroquine 200 milliGRAM(s) Oral daily  influenza   Vaccine 0.5 milliLiter(s) IntraMuscular once  Allergies  morphine (Rash)  morphine (Unknown)  Intolerances  SOCIAL HISTORY:  FAMILY HISTORY:  No pertinent family history in first degree relatives  cannot recall family history at this time  Vital Signs Last 24 Hrs  T(C): 36.6 (12 Oct 2021 08:14), Max: 36.8 (11 Oct 2021 21:11)  T(F): 97.9 (12 Oct 2021 08:14), Max: 98.2 (11 Oct 2021 21:11)  HR: 85 (12 Oct 2021 08:14) (78 - 88)  BP: 152/100 (12 Oct 2021 08:14) (149/89 - 157/87)  BP(mean): 127 (11 Oct 2021 21:11) (127 - 127)  RR: 16 (12 Oct 2021 08:14) (16 - 18)  SpO2: 100% (12 Oct 2021 08:14) (100% - 100%)  PHYSICAL EXAM:  Constitutional:  Eyes: conjunctiva and sclera clear  ENMT: Moist mucous membranes- poor oral hygeine  Neck:  Breasts:  Back:  Respiratory: respirations even and unlabored  Cardiovascular:  Gastrointestinal:  Genitourinary:  Rectal:  Extremities:  Vascular:  Neurological: Alert and oriented x 4  Skin: Unstagebale pressure injury to coccyx  LABS:                        6.6    4.22  )-----------( 82       ( 12 Oct 2021 09:54 )             20.0        HPI: This is a 40 year old female that was admitted to the hospital on 10/12/2021 for renal failure.  PMH- anemia, HTN, RA, SLE on hydroxychloroquine was told to go to ED for evaluation of elevated creatinine  Cr 10- was 5.8 where baseline < 1.0  Has followed up w rheum q 3 months  mobility has changed given bone on bone w knees and shoulders and wrists; not able to use crutches or a walker so has been using a wheelchair  SOCIAL HX  former smoker  no alcohol  once a month marijuana (12 Oct 2021 02:15)    Consulted to unstageable pressure injury to coccyx. Patient presents on an low an AtmosAir 9000 MRS on her right side with heels elevated off mattress. Patient is able to make independent changes with her body repositioning in bed.  Coccyx assessed to have a full-thickness wound measuring 1jem9spf7.3cm.  Wound with 100% black eschar. No odor, periwound intact. Wound irrigated with normal saline. Collagenase applied to wound bed for enzymatic debridement and covered with foam dressing as cover dressing. Patient remains on her right side. Encouraging her to continue turning and positioning every 2 hours to allow her wound to continue to heal. Patient agreeable and receptive.  PAST MEDICAL & SURGICAL HISTORY:  Lupus  Rheumatoid arthritis  H/O Raynaud&#x27;s syndrome  Heroin abuse  Smoker, Rheumatoid arthritis  Sepsis  HTN (hypertension)  COPD (chronic obstructive pulmonary disease)  Depression, Epilepsy  GERD (gastroesophageal reflux disease)  Raynaud&#x27;s syndrome without gangrene  Bacteremia, Systemic lupus erythematosus  Aphonia, H/O tubal ligation  H/O appendicitis, Gall bladder stones  H/O tracheostomy  S/P percutaneous endoscopic gastrostomy (PEG) tube placement  REVIEW OF SYSTEMS  ENMT:	  Respiratory and Thorax: denies shortness of breath  Cardiovascular:	denies chest pain  Gastrointestinal:	  Genitourinary:	  Musculoskeletal:	  Neurological:	  Psychiatric:	  Hematology/Lymphatics:	  Endocrine:	  Allergic/Immunologic:	  MEDICATIONS  (STANDING):  cefTRIAXone Injectable. 1000 milliGRAM(s) IV Push every 24 hours  collagenase Ointment 1 Application(s) Topical daily  doxycycline hyclate Capsule 100 milliGRAM(s) Oral every 12 hours  gabapentin 100 milliGRAM(s) Oral three times a day  heparin   Injectable 5000 Unit(s) SubCutaneous every 8 hours  hydroxychloroquine 200 milliGRAM(s) Oral daily  influenza   Vaccine 0.5 milliLiter(s) IntraMuscular once  Allergies  morphine (Rash)  morphine (Unknown)  Intolerances  SOCIAL HISTORY:  FAMILY HISTORY:  No pertinent family history in first degree relatives  cannot recall family history at this time  Vital Signs Last 24 Hrs  T(C): 36.6 (12 Oct 2021 08:14), Max: 36.8 (11 Oct 2021 21:11)  T(F): 97.9 (12 Oct 2021 08:14), Max: 98.2 (11 Oct 2021 21:11)  HR: 85 (12 Oct 2021 08:14) (78 - 88)  BP: 152/100 (12 Oct 2021 08:14) (149/89 - 157/87)  BP(mean): 127 (11 Oct 2021 21:11) (127 - 127)  RR: 16 (12 Oct 2021 08:14) (16 - 18)  SpO2: 100% (12 Oct 2021 08:14) (100% - 100%)  PHYSICAL EXAM:  Constitutional:  Eyes: conjunctiva and sclera clear  ENMT: Moist mucous membranes- poor oral hygiene  Neck:  Breasts:  Back:  Respiratory: respirations even and unlabored  Cardiovascular:  Gastrointestinal:  Genitourinary:  Rectal:  Extremities:  Vascular:  Neurological: Alert and oriented x 4  Skin: Unstagebale pressure injury to coccyx  LABS:                        6.6    4.22  )-----------( 82       ( 12 Oct 2021 09:54 )             20.0

## 2021-10-12 NOTE — CHART NOTE - NSCHARTNOTEFT_GEN_A_CORE
Alerted for transfusion reaction.   Patient received 1 unit PRBC during the night with no issues. 2nd unit PRBC was started, within 15 minutes patient reported feeling warm and itchy on her back.  Evaluated patient at bedside. No acute distress. Patient denied difficulty breathing, denied back pain.   Vitals unchanged from prior to infusion, no change in temperature.   Patient given tylneol and benadryl with symptomatic relief.  Infusion was stopped and transfusion labs were sent.       PHYSICAL EXAM:  Vital Signs Last 24 Hrs  T(C): 36.5 (12 Oct 2021 22:13), Max: 37.7 (12 Oct 2021 13:33)  T(F): 97.7 (12 Oct 2021 22:13), Max: 99.9 (12 Oct 2021 13:33)  HR: 82 (12 Oct 2021 22:13) (78 - 91)  BP: 142/93 (12 Oct 2021 22:13) (141/96 - 175/101)  BP(mean): 1 (12 Oct 2021 21:45) (1 - 1)  RR: 18 (12 Oct 2021 22:13) (15 - 18)  SpO2: 100% (12 Oct 2021 22:13) (99% - 100%)    PHYSICAL EXAM:  GENERAL: NAD, lying in bed comfortably  HEAD:  Atraumatic, Normocephalic  EYES: EOMI, PERRLA, conjunctiva and sclera clear  ENT: Moist mucous membranes  NECK: Supple, No JVD  RESP: Clear to auscultation bilaterally, good air entry bilaterally; No wheezing, rales, or rhonchi. Unlabored respirations  CARDIAC: Regular rate and rhythm. S1 and S2. No murmurs, rubs, or gallops  GI:  Soft, Nontender, Nondistended. Bowel sounds present x4 quadrants; No hepatomegaly. No splenomegaly.  EXTREMITIES:  2+ Peripheral Pulses. Capillary refill <2 seconds. No clubbing, cyanosis, or edema  NERVOUS SYSTEM:  Alert & Oriented X3, speech clear. No deficits   MSK: Multiple joint deformities noted. LT knee contracted and unable to extend. RT wrist in splint  SKIN: (+) mild blanching patch on L flank area, (+) pruritic, nontender, flat, resolving with benadryl. Multiple skin scars from prior skin abscesses.       Assessment: Mild allergic transfusion reaction, no anaphylaxis.     Repeat H/H ordered for tonight 2 hours s/p 1 unit PRBC   Patient with chronic anemia, had bloody bowel movement previously, last H/H 6.6  Will continue to monitor for now, evaluating for more bloody bowel movements.   If no more bloody bowel movements, if Hgb <7, can transfuse PRBC, premedicate with benadryl   If still having bloody bowel movements, if Hgb <8, can transfuse PRBC, premedicate with benadry

## 2021-10-12 NOTE — H&P ADULT - HISTORY OF PRESENT ILLNESS
40 year old female w hx anemia, HTN, RA, SLE on hydroxychloroquine was told to go to ED for evaluation of elevated creatinine    Cr 10- was 5.8 where baseline < 1.0    Has followed up w rheum q 3 months  mobility has changed given bone on bone w knees and shoulders and wrists; not able to use crutches or a walker so has been using a wheelchair    Uses meloxicam 15 mg qd  last week she used aleve x 2 tabs but usually supplements w acetaminophen    due to chronic pain she was to start gabapentin  she also just picked up her prescription for a new antihypertenive, lisinopril she has not started yet    In ED /109   HR 88    RR 18    T98.2  100% sat RA  Cr 6.72  given 30 cc/kg NS 1500 cc  abn UA w pyuria and microscopic hematuria  ceftriaxone  CT abd/pelvis pending      PAST MEDICAL HX  Aphonia hx  Bacteremia   COPD (chronic obstructive pulmonary disease)   Depression   Epilepsy   GERD (gastroesophageal reflux disease)    Heroin abuse history  HTN (hypertension)  Hx intubation w acute hypoxic resp failure, asp    Hx MRSA  Raynaud's syndrome without gangrene   RA rheumatoid arthritis   Sepsis   Smoker   SLE systemic lupus erythematosus.     PAST SURGICAL HX  Gall bladder stones   H/O appendicitis   H/O tracheostomy 07-  H/O tubal ligation   S/P percutaneous endoscopic gastrostomy (PEG) tube placement.07-     FAMILY HX  Heart Disease: Mother  Father: unknown  No Family Hx of: diagnosed with Diabetes, Cancer, Hypertension,   Stroke, Mental Illness    SOCIAL HX  former smoker  no alcohol  once a month marijuana

## 2021-10-12 NOTE — H&P ADULT - NSHPPHYSICALEXAM_GEN_ALL_CORE
Vital Signs Last 24 Hrs  T(C): 36.7 (12 Oct 2021 00:20), Max: 36.8 (11 Oct 2021 21:11)  T(F): 98.1 (12 Oct 2021 00:20), Max: 98.2 (11 Oct 2021 21:11)  HR: 78 (12 Oct 2021 00:20) (78 - 88)  BP: 154/102 (12 Oct 2021 00:20) (153/109 - 154/102)  BP(mean): 127 (11 Oct 2021 21:11) (127 - 127)  RR: 17 (12 Oct 2021 00:20) (17 - 18)  SpO2: 100% (12 Oct 2021 00:20) (100% - 100%)

## 2021-10-12 NOTE — PATIENT PROFILE ADULT - NSPROPTRIGHTNOTIFY_GEN_A_NUR
Chief Complaint   Patient presents with   • Routine Prenatal Visit     c/o itching       HPI: Carmen is a  currently at 32w5d who today reports the following:Headache - No ; Visual change - No ; Swelling in legs - No ; Nausea - No ; Constipation - No; Diarrhea - No ; Contractions - No ; Leaking fluid - No ; Vaginal bleeding -  No.      ROS: Integument/breast: positive for pruritus and rash  Vitals: See prenatal flowsheet     EXAM:  Abdomen: See prenatal flowsheet   Urine glucose/protein: See prenatal flowsheet   Pelvic: See prenatal flowsheet     Prenatal Labs  Lab Results   Component Value Date    HGB 13.6 2017    HEPBSAG Non-Reactive 2017    ABO A 2017    RH Negative 2017    ABSCRN Negative 2017    QNF4VGN0 Non-Reactive 2017    URINECX 50,000-60,000 CFU/mL Normal Urogenital Adriane 2017       MDM:  Impression:Uncomplicated multiparous and rash on abdoen, itching  Tests done today: cmp, cbc, Hgba1c  Specific topics discussed at today's visit: itching, try beadryl and hydrocortisone cream for now Atarax if not improved  Next visit:none    
declines

## 2021-10-12 NOTE — DIETITIAN INITIAL EVALUATION ADULT. - NAME AND PHONE
Dian Chavira RDN, CDN, [facility]      [phone]   [email or web address]

## 2021-10-12 NOTE — DIETITIAN NUTRITION RISK NOTIFICATION - TREATMENT: THE FOLLOWING DIET HAS BEEN RECOMMENDED
Diet, Renal Restrictions:   For patients receiving Renal Replacement - No Protein Restr, No Conc K, No Conc Phos, Low Sodium (10-12-21 @ 02:48) [Active]

## 2021-10-12 NOTE — PROGRESS NOTE ADULT - SUBJECTIVE AND OBJECTIVE BOX
40y old  Female who presents with a chief complaint of UTI  acute renal failure (12 Oct 2021 12:34)      HPI:  40 year old female w hx anemia, HTN, RA, SLE on hydroxychloroquine was told to go to ED for evaluation of elevated creatinine  Cr 10- was 5.8 where baseline < 1.0  Has followed up w rheum q 3 months  mobility has changed given bone on bone w knees and shoulders and wrists; not able to use crutches or a walker so has been using a wheelchair  Uses meloxicam 15 mg qd  last week she used aleve x 2 tabs but usually supplements w acetaminophen  due to chronic pain she was to start gabapentin  she also just picked up her prescription for a new antihypertenive, lisinopril she has not started yet  In ED /109   HR 88    RR 18    T98.2  100% sat RA  Cr 6.72  given 30 cc/kg NS 1500 cc  abn UA w pyuria and microscopic hematuria  ceftriaxone  CT abd/pelvis pending      PAST MEDICAL HX  Aphonia hx  Bacteremia   COPD (chronic obstructive pulmonary disease)   Depression   Epilepsy   GERD (gastroesophageal reflux disease)    Heroin abuse history  HTN (hypertension)  Hx intubation w acute hypoxic resp failure, asp    Hx MRSA  Raynaud's syndrome without gangrene   RA rheumatoid arthritis   Sepsis   Smoker   SLE systemic lupus erythematosus.     PAST SURGICAL HX  Gall bladder stones   H/O appendicitis   H/O tracheostomy 2020  H/O tubal ligation   S/P percutaneous endoscopic gastrostomy (PEG) tube placement.2020     FAMILY HX  Heart Disease: Mother  Father: unknown  No Family Hx of: diagnosed with Diabetes, Cancer, Hypertension,   Stroke, Mental Illness    SOCIAL HX  former smoker  no alcohol  once a month marijuana (12 Oct 2021 02:15)    10/12/21-  feels weak and tired. Denies CP/SOB. Has LT knee draining wound. +dysuria.     Review of system- All 10 systems reviewed and is as per HPI otherwise negative.     T(C): 36.6 (10-12-21 @ 08:14), Max: 36.8 (10-11-21 @ 21:11)  HR: 85 (10-12-21 @ 08:14) (78 - 88)  BP: 152/100 (10-12-21 @ 08:14) (149/89 - 157/87)  RR: 16 (10-12-21 @ 08:14) (16 - 18)  SpO2: 100% (10-12-21 @ 08:14) (100% - 100%)  Wt(kg): --    LABS:                        6.6    4.22  )-----------( 82       ( 12 Oct 2021 09:54 )             20.0     10-    134<L>  |  104  |  106<H>  ----------------------------<  108<H>  4.5   |  15<L>  |  6.51<H>    Ca    7.0<L>      12 Oct 2021 09:54  Phos  7.7     10-    TPro  7.4  /  Alb  2.5<L>  /  TBili  0.2  /  DBili  x   /  AST  23  /  ALT  13  /  AlkPhos  60  10-    Urinalysis Basic - ( 11 Oct 2021 22:25 )  Color: Yellow / Appearance: Slightly Turbid / S.015 / pH: x  Gluc: x / Ketone: Negative  / Bili: Negative / Urobili: Negative mg/dL   Blood: x / Protein: 500 mg/dL / Nitrite: Negative   Leuk Esterase: Moderate / RBC: >50 /HPF / WBC 26-50   Sq Epi: x / Non Sq Epi: Occasional / Bacteria: TNTC    CARDIAC MARKERS ( 11 Oct 2021 22:25 )  x     / x     / 142 U/L / x     / x        RADIOLOGY & ADDITIONAL TESTS:  EXAM:  CT ABDOMEN AND PELVIS                        PROCEDURE DATE:  10/12/2021      INTERPRETATION:  CLINICAL INFORMATION: NATALIA with hematuria    COMPARISON: CT abdomen pelvis 2020. CT chest 2020    CONTRAST/COMPLICATIONS:  IV Contrast: NONE  Oral Contrast: NONE  Complications: None reported at time of study completion    PROCEDURE:  CT of the Abdomen and Pelvis was performed.  Sagittal and coronal reformats were performed.    FINDINGS:  LOWER CHEST: The cardiac septum is dense relative to the chambers consistent with anemia.  4 mm right lower lobe nodule (2:7). New since previous exam  Subtle tree-in-bud type opacities in the right lower lobe almost completely resolved when compared to the previous exam from 2020.    LIVER: Within normal limits.  BILE DUCTS: Normal caliber.  GALLBLADDER: Cholecystectomy.  SPLEEN: Within normal limits.  PANCREAS: Within normal limits.  ADRENALS: Within normal limits.  KIDNEYS/URETERS: No hydronephrosis. 3 mm nonobstructing left renal mid segment calcification.    BLADDER: Within normal limits.  REPRODUCTIVE ORGANS: Very poorly visualized.    BOWEL: No bowel obstruction. Appendix is not visualized. No evidence of inflammation in the pericecal region.  PERITONEUM: Small amount of pelvic ascites.  VESSELS: Within normal limits.  RETROPERITONEUM/LYMPH NODES: Ill-defined left para-aortic soft tissue may represent lymphadenopathy. This is very poorly evaluated on this exam. Mildly enlarged bilateral pelvic and inguinal lymph nodes.  ABDOMINAL WALL: Mild anasarca.  BONES: Degenerative changes of the lumbosacral spine.    IMPRESSION: Limited evaluation without intravenous contrast.    4 mm right lower lobe nodule (2:7). New since previous exam. This is indeterminant.    Subtletree-in-bud type opacities in the right lower lobe almost completely resolved when compared to the previous exam from 2020.    No hydronephrosis. 2 mm nonobstructing left renal mid segment calcification.    Ill-defined left para-aortic soft tissue may represent lymphadenopathy. This is very poorly evaluated on this exam. Mildly enlarged bilateral pelvic and inguinal lymph nodes.. Further evaluation is needed contrast-enhanced examination is recommended.      PHYSICAL EXAM:  GENERAL: NAD, cachectic, pale  HEAD:  Atraumatic, Normocephalic  EYES: EOMI, PERRLA, conjunctiva and sclera clear  HEENT: Dry mucous membranes,  NECK: Supple, No JVD  NERVOUS SYSTEM:  Alert & Oriented X3, Motor Strength 5/5 B/L upper and lower extremities; DTRs 2+ intact and symmetric  CHEST/LUNG: Clear to auscultation bilaterally; No rales, rhonchi, wheezing, or rubs  HEART: Regular rate and rhythm; No murmurs, rubs, or gallops  ABDOMEN: Soft, Nontender, Nondistended; Bowel sounds present  GENITOURINARY- Voiding, no palpable bladder  EXTREMITIES:  No edema.   MUSCULOSKELTAL- Multiple joint deformities noted. LT knee contracted and unable to extend. RT wrist in splint  SKIN- dressing removed from LT knee and noted lateral boil actively draining pus. dressing over the coccyx for decubitus. Multiple skin scars from prior skin abscesses.   CNS- alert, oriented X3, non focal     Daily Height in cm: 165.1 (11 Oct 2021 21:06)      MEDICATIONS  (STANDING):  cefTRIAXone Injectable. 1000 milliGRAM(s) IV Push every 24 hours  collagenase Ointment 1 Application(s) Topical daily  dextrose 5% 1000 milliLiter(s) (100 mL/Hr) IV Continuous <Continuous>  doxycycline hyclate Capsule 100 milliGRAM(s) Oral every 12 hours  gabapentin 100 milliGRAM(s) Oral three times a day  heparin   Injectable 5000 Unit(s) SubCutaneous every 8 hours  hydroxychloroquine 200 milliGRAM(s) Oral daily  influenza   Vaccine 0.5 milliLiter(s) IntraMuscular once    MEDICATIONS  (PRN):  acetaminophen   Tablet .. 650 milliGRAM(s) Oral every 6 hours PRN Temp greater or equal to 38C (100.4F), Mild Pain (1 - 3)  cloNIDine 0.1 milliGRAM(s) Oral every 8 hours PRN BP sys > 140 or diastolic > 100  melatonin 3 milliGRAM(s) Oral at bedtime PRN Insomnia  ondansetron Injectable 4 milliGRAM(s) IV Push every 8 hours PRN Nausea and/or Vomiting    Assessment/Plan  #Acute renal failure likely prerenal + NSAID's use  #Metabolic acidosis  Nephrology eval appreciated  IVF per renal/ bicarb  Monitor BMP  Avoid NSAID's    #UTI  Urine c/s pending  Cont IV Rocephin for now till cultures are available  ID eval for further IV antibiotics    #LT knee draining boil  H/o MRSA before  Start PO Doxycycline  ID eval for further abx    #Rheumatoid arthritis/ possible SLE  Rheum eval DR Quintana group  Cont Plaquenil for now    #Anemia likely chronic  Will transfuse 2 Units PRBC today  Monitor HH    #Thrombocytopenia- likely chronic  Monitor counts    #HTN- cont current meds    #DVT proph- heparin SQ    #Dispo- Transfuse today. Cont IVF and monitor renal function. Transfer to med-surg floor with MRSA contact isolation. D/w pt, DR Soni

## 2021-10-12 NOTE — H&P ADULT - NSHPLABSRESULTS_GEN_ALL_CORE
EXAM:  CT ABDOMEN AND PELVIS                        PROCEDURE DATE:  10/12/2021    INTERPRETATION:  CLINICAL INFORMATION: NATALIA with hematuria    COMPARISON: CT abdomen pelvis 7/23/2020. CT chest 6/28/2020    CONTRAST/COMPLICATIONS:  IV Contrast: NONE  Oral Contrast: NONE  Complications: None reported at time of study completion    PROCEDURE:  CT of the Abdomen and Pelvis was performed.  Sagittal and coronal reformats were performed.    FINDINGS:  LOWER CHEST: The cardiac septum is dense relative to the chambers consistent with anemia.  4 mm right lower lobe nodule (2:7). New since previous exam  Subtle tree-in-bud type opacities in the right lower lobe almost completely resolved when compared to the previous exam from 7/23/2020.    LIVER: Within normal limits.  BILE DUCTS: Normal caliber.  GALLBLADDER: Cholecystectomy.  SPLEEN: Within normal limits.  PANCREAS: Within normal limits.  ADRENALS: Within normal limits.  KIDNEYS/URETERS: No hydronephrosis. 3 mm nonobstructing left renal mid segment calcification.    BLADDER: Within normal limits.  REPRODUCTIVE ORGANS: Very poorly visualized.    BOWEL: No bowel obstruction. Appendix is not visualized. No evidence of inflammation in the pericecal region.  PERITONEUM: Small amount of pelvic ascites.  VESSELS: Within normal limits.  RETROPERITONEUM/LYMPH NODES: Ill-defined left para-aortic soft tissue may represent lymphadenopathy. This is very poorly evaluated on this exam. Mildly enlarged bilateral pelvic and inguinal lymph nodes.  ABDOMINAL WALL: Mild anasarca.  BONES: Degenerative changes of the lumbosacral spine.    IMPRESSION: Limited evaluation without intravenous contrast.    4 mm right lower lobe nodule (2:7). New since previous exam. This is indeterminant.    Subtle tree-in-bud type opacities in the right lower lobe almost completely resolved when compared to the previous exam from 7/23/2020.    No hydronephrosis. 2 mm nonobstructing left renal mid segment calcification.    Ill-defined left para-aortic soft tissue may represent lymphadenopathy. This is very poorly evaluated on this exam. Mildly enlarged bilateral pelvic and inguinal lymph nodes.. Further evaluation is needed contrast-enhanced examination is recommended.

## 2021-10-12 NOTE — H&P ADULT - ASSESSMENT
40 year old female w hx anemia, HTN, RA, SLE on hydroxychloroquine was told to go to ED for evaluation of elevated creatinine      #NATALIA  Cr baseline < 1 was 0.97 on  in EMR vs 5.8 10-07 and now 6.72  --on meloxicam 15 mg qd w rare use of other NSAIDs  --had not started lisinopril, the new BP medication  --CT scan showed non-obstructing calculus  as above  perhaps due to SLE?  #abn UA w pyuria and hematuria UTI  1. admit to med  2. I/O q 8 hrs  3. s/p 1500 cc NS  4. continue  cc/hr  5. ceftriaxone 1000 mg q 24 hrs  6. renal consult  7. BMP, P in AM      #anemia  long standing w cardiac septal thickening  expecting a drop in measured H/H given hydration and hematuria  1. trend      #Thrombocytopenia  106 K today vs 498on   1. trend      #SLE  #RA  1. Rheum consult attempted , her rheum MD  no longer comes to hosp  2. should follow up call in AM to discuss work up once seen by renal  3. will reduce hydroxychloroquine to 200 mg qd from BID pending discussion as noted above      #VTE  IMPROVE VTE Individual Risk Assessment    RISK                                                                Points    [  ] Previous VTE                                                  3    [  ] Thrombophilia                                               2    [  ] Lower limb paralysis                                      2        (unable to hold up >15 seconds)      [  ] Current Cancer                                              2         (within 6 months)    [  ] Immobilization > 24 hrs                                1    [  ] ICU/CCU stay > 24 hours                              1    [  ] Age > 60                                                      1    IMPROVE VTE Score _____0____    IMPROVE Score 0-1: Low Risk, No VTE prophylaxis required for most patients, encourage ambulation.   IMPROVE Score 2-3: At risk, pharmacologic VTE prophylaxis is indicated for most patients (in the absence of a contraindication)  IMPROVE Score > or = 4: High Risk, pharmacologic VTE prophylaxis is indicated for most patients (in the absence of a contraindication)      VTE sedentary sq heparin  med using pt's meds printed by pharmacy  renal consult called at 03: 24 for AM consult  rheum consult called at 03:28 for AM consult;  was informed that she does not come to hospital for consults now 40 year old female w hx anemia, HTN, RA, SLE on hydroxychloroquine was told to go to ED for evaluation of elevated creatinine      #NATALIA  Cr baseline < 1 was 0.97 on  in EMR vs 5.8 10-07 and now 6.72  --on meloxicam 15 mg qd w rare use of other NSAIDs  --had not started lisinopril, the new BP medication  --CT scan showed non-obstructing 3 mm calculus  as above  perhaps deterioration due to SLE?  #abn UA w pyuria and hematuria UTI  1. admit to med  2. I/O q 8 hrs  3. s/p 1500 cc NS  4. continue  cc/hr  5. ceftriaxone 1000 mg q 24 hrs  6. renal consult  7. BMP, P in AM      #anemia  long standing w cardiac septal thickening  expecting a drop in measured H/H given hydration and hematuria  1. trend      #Thrombocytopenia  106 K today vs 498on   1. trend      #HTN  1. did not start lisinopril  2. clonidine 0.1 mg q 8 hrs prn BP sys > 140 or diast > 100      #SLE  #RA  1. Rheum consult attempted , her rheum MD  no longer comes to hosp  2. should follow up call in AM to discuss work up once seen by renal  3. will reduce hydroxychloroquine to 200 mg qd from BID pending discussion as noted above      #VTE  IMPROVE VTE Individual Risk Assessment    RISK                                                                Points    [  ] Previous VTE                                                  3    [  ] Thrombophilia                                               2    [  ] Lower limb paralysis                                      2        (unable to hold up >15 seconds)      [  ] Current Cancer                                              2         (within 6 months)    [  ] Immobilization > 24 hrs                                1    [  ] ICU/CCU stay > 24 hours                              1    [  ] Age > 60                                                      1    IMPROVE VTE Score _____0____    IMPROVE Score 0-1: Low Risk, No VTE prophylaxis required for most patients, encourage ambulation.   IMPROVE Score 2-3: At risk, pharmacologic VTE prophylaxis is indicated for most patients (in the absence of a contraindication)  IMPROVE Score > or = 4: High Risk, pharmacologic VTE prophylaxis is indicated for most patients (in the absence of a contraindication)      VTE sedentary sq heparin  med using pt's meds printed by pharmacy  renal consult called at 03: 24 for AM consult  rheum consult called at 03:28 for AM consult;  was informed that she does not come to hospital for consults now 40 year old female w hx anemia, HTN, RA, SLE on hydroxychloroquine was told to go to ED for evaluation of elevated creatinine      #NATALIA  Cr baseline < 1 was 0.97 on  in EMR vs 5.8 10-07 and now 6.72  --on meloxicam 15 mg qd w rare use of other NSAIDs  --had not started lisinopril, the new BP medication  --CT scan showed non-obstructing 3 mm calculus  as above  perhaps deterioration due to SLE?  #abn UA w pyuria and hematuria UTI  1. admit to med  2. I/O q 8 hrs  3. s/p 1500 cc NS  4. continue  cc/hr  5. ceftriaxone 1000 mg q 24 hrs  6. renal consult  7. BMP, P in AM  8. sed rate, CRP      #anemia  long standing w cardiac septal thickening  expecting a drop in measured H/H given hydration and hematuria  1. trend      #Thrombocytopenia  106 K today vs 498on   1. trend      #HTN  1. did not start lisinopril  2. clonidine 0.1 mg q 8 hrs prn BP sys > 140 or diast > 100      #SLE  #RA  1. Rheum consult attempted , her rheum MD  no longer comes to hosp  2. should follow up call in AM to discuss work up once seen by renal  3. will reduce hydroxychloroquine to 200 mg qd from BID pending discussion as noted above      #VTE  IMPROVE VTE Individual Risk Assessment    RISK                                                                Points    [  ] Previous VTE                                                  3    [  ] Thrombophilia                                               2    [  ] Lower limb paralysis                                      2        (unable to hold up >15 seconds)      [  ] Current Cancer                                              2         (within 6 months)    [  ] Immobilization > 24 hrs                                1    [  ] ICU/CCU stay > 24 hours                              1    [  ] Age > 60                                                      1    IMPROVE VTE Score _____0____    IMPROVE Score 0-1: Low Risk, No VTE prophylaxis required for most patients, encourage ambulation.   IMPROVE Score 2-3: At risk, pharmacologic VTE prophylaxis is indicated for most patients (in the absence of a contraindication)  IMPROVE Score > or = 4: High Risk, pharmacologic VTE prophylaxis is indicated for most patients (in the absence of a contraindication)      VTE sedentary sq heparin  med using pt's meds printed by pharmacy  renal consult called at 03: 24 for AM consult  rheum consult called at 03:28 for AM consult;  was informed that she does not come to hospital for consults now 40 year old female w hx anemia, HTN, RA, SLE on hydroxychloroquine was told to go to ED for evaluation of elevated creatinine      #NATALIA  Cr baseline < 1 was 0.97 on  in EMR vs 5.8 10-07 and now 6.72  --on meloxicam 15 mg qd w rare use of other NSAIDs  --had not started lisinopril, the new BP medication  --CT scan showed non-obstructing 3 mm calculus  as above  perhaps deterioration due to SLE?  #abn UA w pyuria and hematuria UTI  1. admit to med  2. I/O q 8 hrs  3. s/p 1500 cc NS  4. continue  cc/hr  5. ceftriaxone 1000 mg q 24 hrs  6. renal consult  7. BMP, P in AM  8. sed rate, CRP      #anemia  long standing w cardiac septal thickening  expecting a drop in measured H/H given hydration and hematuria  1. trend      #Thrombocytopenia  106 K today vs 498on   1. trend      #Hypoalbuminemia  has anasarca on CT  1. not sure if related to intake or increased losses in urine      #HTN  1. did not start lisinopril  2. clonidine 0.1 mg q 8 hrs prn BP sys > 140 or diast > 100      #SLE  #RA  1. Rheum consult attempted , her rheum MD  no longer comes to hosp  2. should follow up call in AM to discuss work up once seen by renal  3. will reduce hydroxychloroquine to 200 mg qd from BID pending discussion as noted above        #MISC pt informed of above  1. lower lung nodule on CT new sine 2020 - will likely need follow up CT as outpt  2. poorly defined lymphadenopathy in pelvis unable to characterize w/o contrast - will need follow up      #VTE  IMPROVE VTE Individual Risk Assessment    RISK                                                                Points    [  ] Previous VTE                                                  3    [  ] Thrombophilia                                               2    [  ] Lower limb paralysis                                      2        (unable to hold up >15 seconds)      [  ] Current Cancer                                              2         (within 6 months)    [  ] Immobilization > 24 hrs                                1    [  ] ICU/CCU stay > 24 hours                              1    [  ] Age > 60                                                      1    IMPROVE VTE Score _____0____    IMPROVE Score 0-1: Low Risk, No VTE prophylaxis required for most patients, encourage ambulation.   IMPROVE Score 2-3: At risk, pharmacologic VTE prophylaxis is indicated for most patients (in the absence of a contraindication)  IMPROVE Score > or = 4: High Risk, pharmacologic VTE prophylaxis is indicated for most patients (in the absence of a contraindication)      VTE sedentary sq heparin  med using pt's meds printed by pharmacy  renal consult called at 03: 24 for AM consult, pt requested Dr. Pereira who is also on call  rheum consult called at 03:28 for AM consult;  was informed that she does not come to hospital for consults now

## 2021-10-13 LAB
ALBUMIN SERPL ELPH-MCNC: 1.7 G/DL — LOW (ref 3.3–5)
ALP SERPL-CCNC: 37 U/L — LOW (ref 40–120)
ALT FLD-CCNC: <6 U/L — LOW (ref 12–78)
ANION GAP SERPL CALC-SCNC: 13 MMOL/L — SIGNIFICANT CHANGE UP (ref 5–17)
AST SERPL-CCNC: 13 U/L — LOW (ref 15–37)
BILIRUB SERPL-MCNC: 0.2 MG/DL — SIGNIFICANT CHANGE UP (ref 0.2–1.2)
BUN SERPL-MCNC: 99 MG/DL — HIGH (ref 7–23)
C3 SERPL-MCNC: 67 MG/DL — LOW (ref 81–157)
C4 SERPL-MCNC: 11 MG/DL — LOW (ref 13–39)
CALCIUM SERPL-MCNC: 7 MG/DL — LOW (ref 8.5–10.1)
CHLORIDE SERPL-SCNC: 107 MMOL/L — SIGNIFICANT CHANGE UP (ref 96–108)
CO2 SERPL-SCNC: 19 MMOL/L — LOW (ref 22–31)
CREAT ?TM UR-MCNC: 32 MG/DL — SIGNIFICANT CHANGE UP
CREAT SERPL-MCNC: 6.1 MG/DL — HIGH (ref 0.5–1.3)
CRP SERPL-MCNC: 55 MG/L — HIGH
GLUCOSE SERPL-MCNC: 101 MG/DL — HIGH (ref 70–99)
HAV IGM SER-ACNC: SIGNIFICANT CHANGE UP
HBV CORE IGM SER-ACNC: SIGNIFICANT CHANGE UP
HBV SURFACE AG SER-ACNC: SIGNIFICANT CHANGE UP
HCT VFR BLD CALC: 24.7 % — LOW (ref 34.5–45)
HCT VFR BLD CALC: 26.5 % — LOW (ref 34.5–45)
HCV AB S/CO SERPL IA: 0.26 S/CO — SIGNIFICANT CHANGE UP (ref 0–0.99)
HCV AB SERPL-IMP: SIGNIFICANT CHANGE UP
HGB BLD-MCNC: 8.2 G/DL — LOW (ref 11.5–15.5)
HGB BLD-MCNC: 8.8 G/DL — LOW (ref 11.5–15.5)
MCHC RBC-ENTMCNC: 27.7 PG — SIGNIFICANT CHANGE UP (ref 27–34)
MCHC RBC-ENTMCNC: 33.2 GM/DL — SIGNIFICANT CHANGE UP (ref 32–36)
MCV RBC AUTO: 83.3 FL — SIGNIFICANT CHANGE UP (ref 80–100)
PCP SPEC-MCNC: SIGNIFICANT CHANGE UP
PHOSPHATE SERPL-MCNC: 7.7 MG/DL — HIGH (ref 2.5–4.5)
PLATELET # BLD AUTO: 125 K/UL — LOW (ref 150–400)
POTASSIUM SERPL-MCNC: 4.4 MMOL/L — SIGNIFICANT CHANGE UP (ref 3.5–5.3)
POTASSIUM SERPL-SCNC: 4.4 MMOL/L — SIGNIFICANT CHANGE UP (ref 3.5–5.3)
PROT ?TM UR-MCNC: 241 MG/DL — HIGH (ref 0–12)
PROT SERPL-MCNC: 5.7 GM/DL — LOW (ref 6–8.3)
PROT/CREAT UR-RTO: 7.5 RATIO — HIGH (ref 0–0.2)
RBC # BLD: 3.18 M/UL — LOW (ref 3.8–5.2)
RBC # FLD: 15.1 % — HIGH (ref 10.3–14.5)
SODIUM SERPL-SCNC: 139 MMOL/L — SIGNIFICANT CHANGE UP (ref 135–145)
WBC # BLD: 4.72 K/UL — SIGNIFICANT CHANGE UP (ref 3.8–10.5)
WBC # FLD AUTO: 4.72 K/UL — SIGNIFICANT CHANGE UP (ref 3.8–10.5)

## 2021-10-13 PROCEDURE — 86077 PHYS BLOOD BANK SERV XMATCH: CPT

## 2021-10-13 PROCEDURE — 72195 MRI PELVIS W/O DYE: CPT | Mod: 26

## 2021-10-13 PROCEDURE — 99232 SBSQ HOSP IP/OBS MODERATE 35: CPT

## 2021-10-13 RX ORDER — AMLODIPINE BESYLATE 2.5 MG/1
5 TABLET ORAL DAILY
Refills: 0 | Status: DISCONTINUED | OUTPATIENT
Start: 2021-10-13 | End: 2021-10-15

## 2021-10-13 RX ADMIN — Medication 100 MILLIGRAM(S): at 09:53

## 2021-10-13 RX ADMIN — Medication 0.1 MILLIGRAM(S): at 07:54

## 2021-10-13 RX ADMIN — PANTOPRAZOLE SODIUM 10 MG/HR: 20 TABLET, DELAYED RELEASE ORAL at 18:46

## 2021-10-13 RX ADMIN — PANTOPRAZOLE SODIUM 10 MG/HR: 20 TABLET, DELAYED RELEASE ORAL at 07:25

## 2021-10-13 RX ADMIN — SODIUM CHLORIDE 100 MILLILITER(S): 9 INJECTION, SOLUTION INTRAVENOUS at 07:30

## 2021-10-13 RX ADMIN — GABAPENTIN 100 MILLIGRAM(S): 400 CAPSULE ORAL at 05:51

## 2021-10-13 RX ADMIN — CEFTRIAXONE 1000 MILLIGRAM(S): 500 INJECTION, POWDER, FOR SOLUTION INTRAMUSCULAR; INTRAVENOUS at 22:59

## 2021-10-13 RX ADMIN — Medication 200 MILLIGRAM(S): at 09:53

## 2021-10-13 RX ADMIN — Medication 100 MILLIGRAM(S): at 22:59

## 2021-10-13 RX ADMIN — GABAPENTIN 100 MILLIGRAM(S): 400 CAPSULE ORAL at 13:36

## 2021-10-13 RX ADMIN — Medication 1 APPLICATION(S): at 14:01

## 2021-10-13 RX ADMIN — AMLODIPINE BESYLATE 5 MILLIGRAM(S): 2.5 TABLET ORAL at 10:35

## 2021-10-13 RX ADMIN — Medication 650 MILLIGRAM(S): at 23:43

## 2021-10-13 RX ADMIN — GABAPENTIN 100 MILLIGRAM(S): 400 CAPSULE ORAL at 22:59

## 2021-10-13 NOTE — PROGRESS NOTE ADULT - SUBJECTIVE AND OBJECTIVE BOX
Interval History:    MEDICATIONS  (STANDING):  amLODIPine   Tablet 5 milliGRAM(s) Oral daily  cefTRIAXone Injectable. 1000 milliGRAM(s) IV Push every 24 hours  collagenase Ointment 1 Application(s) Topical daily  dextrose 5% 1000 milliLiter(s) (100 mL/Hr) IV Continuous <Continuous>  doxycycline hyclate Capsule 100 milliGRAM(s) Oral every 12 hours  gabapentin 100 milliGRAM(s) Oral three times a day  hydroxychloroquine 200 milliGRAM(s) Oral daily  influenza   Vaccine 0.5 milliLiter(s) IntraMuscular once  pantoprazole Infusion 8 mG/Hr (10 mL/Hr) IV Continuous <Continuous>    MEDICATIONS  (PRN):  acetaminophen   Tablet .. 650 milliGRAM(s) Oral every 6 hours PRN Temp greater or equal to 38C (100.4F), Mild Pain (1 - 3)  diphenhydrAMINE 25 milliGRAM(s) Oral every 4 hours PRN Rash and/or Itching  melatonin 3 milliGRAM(s) Oral at bedtime PRN Insomnia  ondansetron Injectable 4 milliGRAM(s) IV Push every 8 hours PRN Nausea and/or Vomiting      Daily     Daily   BMI: 18.3 (10-11 @ 21:06)  Change in Weight:  Vital Signs Last 24 Hrs  T(C): 36.6 (13 Oct 2021 07:47), Max: 36.9 (12 Oct 2021 17:59)  T(F): 97.9 (13 Oct 2021 07:47), Max: 98.4 (12 Oct 2021 17:59)  HR: 69 (13 Oct 2021 10:23) (69 - 91)  BP: 138/94 (13 Oct 2021 10:23) (138/94 - 175/101)  BP(mean): 1 (12 Oct 2021 21:45) (1 - 1)  RR: 17 (13 Oct 2021 07:47) (16 - 18)  SpO2: 100% (13 Oct 2021 07:47) (99% - 100%)  I&O's Detail    12 Oct 2021 07:01  -  13 Oct 2021 07:00  --------------------------------------------------------  IN:    PRBCs (Packed Red Blood Cells): 321 mL    sodium chloride 0.9%: 700 mL  Total IN: 1021 mL    OUT:    Voided (mL): 200 mL  Total OUT: 200 mL    Total NET: 821 mL          PHYSICAL EXAM    HEENT:    Pallor  Neck:  No masses, no asymmetry.  Lymph Nodes:  No lymphadenopathy.   Cardiovascular:  RRR normal S1/S2, no murmur.  Respiratory:  CTA B/L, normal respiratory effort.   Abdominal:   soft, no masses or tenderness, normoactive BS, NT/ND, no HSM.  Extremities:   No clubbing or cyanosis, normal capillary refill, no edema.   Skin:   No rash, jaundice, lesions, eczema.   Musculoskeletal:  No joint swelling, erythema or tenderness.   Other:     Lab Results:                        8.8    4.72  )-----------( 125      ( 13 Oct 2021 09:44 )             26.5     10-13    139  |  107  |  99<H>  ----------------------------<  101<H>  4.4   |  19<L>  |  6.10<H>    Ca    7.0<L>      13 Oct 2021 09:44  Phos  7.7     10-13    TPro  5.7<L>  /  Alb  1.7<L>  /  TBili  0.2  /  DBili  x   /  AST  13<L>  /  ALT  <6<L>  /  AlkPhos  37<L>  10-13    LIVER FUNCTIONS - ( 13 Oct 2021 09:44 )  Alb: 1.7 g/dL / Pro: 5.7 gm/dL / ALK PHOS: 37 U/L / ALT: <6 U/L / AST: 13 U/L / GGT: x             Sedimentation Rate, Erythrocyte: 25 mm/hr (10-13 @ 09:44)      Stool Results:          RADIOLOGY RESULTS:    SURGICAL PATHOLOGY:

## 2021-10-13 NOTE — PROGRESS NOTE ADULT - ASSESSMENT
39 female with anemia, HTN, RA, SLE was told to go to ED for evaluation of elevated creatinine from rheum office after long absence of followup   Cr 10- was 5.8 where baseline < 1.0.     NATALIA - Cr slowly tredning down   -Pre-renal/NSAID related  vs other consideration is lupus nephritis with proteinuria and hematuria noted  serologies noted  Urine culture > 100K - on iV abx per ID   monitor renal trends  no acute indication for HD now   consideration of renal bx if cr does not trend down and await ID clearance in light of urine culture , r/o systemic involvement    Met acidosis -  bicarb gtt  -NO acute need for HD  -Lupus serology -  Rheum consults   -Treatment of underlying UTIph    Hyperphos  -Will need binders if not stabilizing with hydration - calcium acetate    Anemia  Managment per medicine      d/w Royam earlier - Dr Del Rosario - pt has not followed up actively in office    advised on limitis of immunusupressants in light of possible inffection and now active urine culutre

## 2021-10-13 NOTE — PROGRESS NOTE ADULT - SUBJECTIVE AND OBJECTIVE BOX
40y old  Female who presents with a chief complaint of UTI/fatiuge  acute renal failure (12 Oct 2021 12:34)   Subjective; More energy today. Denies fever, chills, chest pain, SOB. Baseline diffuse body/joint pain. Decreased urinary frequency reported  Review of Systems: 14 Point review of systems reviewed and reported as negative unless otherwise stated in Subjective    "HPI:  40 year old female w hx anemia, HTN, RA, SLE on hydroxychloroquine was told to go to ED for evaluation of elevated creatinine  Cr 10- was 5.8 where baseline < 1.0  Has followed up w rheum q 3 months  mobility has changed given bone on bone w knees and shoulders and wrists; not able to use crutches or a walker so has been using a wheelchair  Uses meloxicam 15 mg qd  last week she used aleve x 2 tabs but usually supplements w acetaminophen  due to chronic pain she was to start gabapentin  she also just picked up her prescription for a new antihypertenive, lisinopril she has not started yet  In ED /109   HR 88    RR 18    T98.2  100% sat RA  Cr 6.72  given 30 cc/kg NS 1500 cc  abn UA w pyuria and microscopic hematuria  ceftriaxone"        PAST MEDICAL HX  Aphonia hx  Bacteremia   COPD (chronic obstructive pulmonary disease)   Depression   Epilepsy   GERD (gastroesophageal reflux disease)    Heroin abuse history  HTN (hypertension)  Hx intubation w acute hypoxic resp failure, asp    Hx MRSA  Raynaud's syndrome without gangrene   RA rheumatoid arthritis   Sepsis   Smoker   SLE systemic lupus erythematosus.     PAST SURGICAL HX  Gall bladder stones   H/O appendicitis   H/O tracheostomy 2020  H/O tubal ligation   S/P percutaneous endoscopic gastrostomy (PEG) tube placement.2020     FAMILY HX  Heart Disease: Mother  Father: unknown  No Family Hx of: diagnosed with Diabetes, Cancer, Hypertension,   Stroke, Mental Illness    SOCIAL HX  former smoker  no alcohol  once a month marijuana (12 Oct 2021 02:15)          LABS:                                 8.8    4.72  )-----------( 125      ( 13 Oct 2021 09:44 )             26.5     10-13    139  |  107  |  99<H>  ----------------------------<  101<H>  4.4   |  19<L>  |  6.10<H>    Ca    7.0<L>      13 Oct 2021 09:44  Phos  7.7     1013    TPro  5.7<L>  /  Alb  1.7<L>  /  TBili  0.2  /  DBili  x   /  AST  13<L>  /  ALT  <6<L>  /  AlkPhos  37<L>  10    COVID-19 PCR: NotDetec (11 Oct 2021 22:25)    CAPILLARY BLOOD GLUCOSE          Culture - Urine (collected 11 Oct 2021 22:25)  Source: Clean Catch None  Preliminary Report (13 Oct 2021 21:39):    >100,000 CFU/ml Enterobacter cloacae complex                   6.6    4.22  )-----------( 82       ( 12 Oct 2021 09:54 )             20.0     10    134<L>  |  104  |  106<H>  Cocaine Metabolite, Urine: Positive (10.13.21 @ 11:30) THC, Urine Qualitative: Positive (10.13.21 @ 11:30) Protein/Creatinine Ratio, Urine (10.13.21 @ 11:30)   Protein/Creatinine Ratio Calculation: 7.5 Ratio   Total Protein, Random Urine: 241 mg/dL   Creatinine, Random Urine: 32:C-Reactive Protein, Serum: 55 mg/L (10.13.21 @ 09:45) Sedimentation Rate, Erythrocyte: 25 mm/hrC4 Complement, Serum: 11 mg/dL (10.13. @ 09:44) C3 Complement, Serum: 67 mg/dL (10..21 @ 09:44) ----------------------------<  108<H>  4.5   |  15<L>  |  6.51<H>    Ca    7.0<L>      12 Oct 2021 09:54  Phos  7.7     10-12    < from: MR Pelvis Bony Only No Cont (10.13.21 @ 14:58) >  IMPRESSION:  1.  Sacral decubitus ulcer, as described above.  2.  No MR evidence of acute osteomyelitis.  3.  Moderate to large amount of pelvic free fluid.    < end of copied text >  < from: CT Abdomen and Pelvis No Cont (10.12.21 @ 00:01) >  IMPRESSION: Limited evaluation without intravenous contrast.    4 mm right lower lobe nodule (2:7). New since previous exam. This is indeterminant.    Subtletree-in-bud type opacities in the right lower lobe almost completely resolved when compared to the previous exam from 2020.    No hydronephrosis. 2 mm nonobstructing left renal mid segment calcification.    Ill-defined left para-aortic soft tissue may represent lymphadenopathy. This is very poorly evaluated on this exam. Mildly enlarged bilateral pelvic and inguinal lymph nodes.. Further evaluation is needed contrast-enhanced examination is recommended.    < end of copied text >  < from: 12 Lead ECG (10.11.21 @ 23:11) >    Diagnosis Line Normal sinus rhythm  Normal ECG    < end of copied text >  TPro  7.4  /  Alb  2.5<L>  /  TBili  0.2  /  DBili  x   /  AST  23  /  ALT  13  /  AlkPhos  60  10-    Urinalysis Basic - ( 11 Oct 2021 22:25 )  Color: Yellow / Appearance: Slightly Turbid / S.015 / pH: x  Gluc: x / Ketone: Negative  / Bili: Negative / Urobili: Negative mg/dL   Blood: x / Protein: 500 mg/dL / Nitrite: Negative   Leuk Esterase: Moderate / RBC: >50 /HPF / WBC 26-50   Sq Epi: x / Non Sq Epi: Occasional / Bacteria: TNTC    CARDIAC MARKERS ( 11 Oct 2021 22:25 )  x     / x     / 142 U/L / x     / x        RADIOLOGY & ADDITIONAL TESTS:  EXAM:  CT ABDOMEN AND PELVIS                        PROCEDURE DATE:  10/12/2021      INTERPRETATION:  CLINICAL INFORMATION: NATALIA with hematuria    COMPARISON: CT abdomen pelvis 2020. CT chest 2020    CONTRAST/COMPLICATIONS:  IV Contrast: NONE  Oral Contrast: NONE  Complications: None reported at time of study completion    PROCEDURE:  CT of the Abdomen and Pelvis was performed.  Sagittal and coronal reformats were performed.    FINDINGS:  LOWER CHEST: The cardiac septum is dense relative to the chambers consistent with anemia.  4 mm right lower lobe nodule (2:7). New since previous exam  Subtle tree-in-bud type opacities in the right lower lobe almost completely resolved when compared to the previous exam from 2020.    LIVER: Within normal limits.  BILE DUCTS: Normal caliber.  GALLBLADDER: Cholecystectomy.  SPLEEN: Within normal limits.  PANCREAS: Within normal limits.  ADRENALS: Within normal limits.  KIDNEYS/URETERS: No hydronephrosis. 3 mm nonobstructing left renal mid segment calcification.    BLADDER: Within normal limits.  REPRODUCTIVE ORGANS: Very poorly visualized.    BOWEL: No bowel obstruction. Appendix is not visualized. No evidence of inflammation in the pericecal region.  PERITONEUM: Small amount of pelvic ascites.  VESSELS: Within normal limits.  RETROPERITONEUM/LYMPH NODES: Ill-defined left para-aortic soft tissue may represent lymphadenopathy. This is very poorly evaluated on this exam. Mildly enlarged bilateral pelvic and inguinal lymph nodes.  ABDOMINAL WALL: Mild anasarca.  BONES: Degenerative changes of the lumbosacral spine.    IMPRESSION: Limited evaluation without intravenous contrast.    4 mm right lower lobe nodule (2:7). New since previous exam. This is indeterminant.    Subtletree-in-bud type opacities in the right lower lobe almost completely resolved when compared to the previous exam from 2020.    No hydronephrosis. 2 mm nonobstructing left renal mid segment calcification.    Ill-defined left para-aortic soft tissue may represent lymphadenopathy. This is very poorly evaluated on this exam. Mildly enlarged bilateral pelvic and inguinal lymph nodes.. Further evaluation is needed contrast-enhanced examination is recommended.      PHYSICAL EXAM:  T(C): 36.5 (10-13-21 @ 16:17), Max: 36.6 (10-13-21 @ 07:47)  HR: 87 (10-13-21 @ 16:17) (69 - 87)  BP: 150/108 (10-13-21 @ 16:17) (138/94 - 165/99)  RR: 17 (10-13-21 @ 16:17) (17 - 18)  SpO2: 100% (10-13-21 @ 16:17) (99% - 100%)  GENERAL: NAD, cachectic, pale  HEAD:  Atraumatic, Normocephalic  EYES: EOMI, PERRL, conjunctiva and sclera clear  HEENT: Dry mucous membranes,  NECK: Supple, No JVD  NERVOUS SYSTEM:  Alert & Oriented X3, Motor Strength 5/5 B/L upper and lower extremities; DTRs 2+ intact and symmetric  CHEST/LUNG: Clear to auscultation bilaterally; No rales, rhonchi, wheezing, or rubs  HEART: Regular rate and rhythm; No murmurs, rubs, or gallops  ABDOMEN: Soft, Nontender, Nondistended; Bowel sounds present  GENITOURINARY- Voiding, no palpable bladder  EXTREMITIES:  No edema.   MUSCULOSKELTAL- Multiple joint deformities noted. LT knee contracted and unable to extend. RT wrist in splint  SKIN- dressing removed from LT knee and noted lateral boil actively draining pus. dressing over the coccyx for decubitus. Multiple skin scars from prior skin abscesses.   CNS- alert, oriented X3, non focal     Daily Height in cm: 165.1 (11 Oct 2021 21:06)      MEDICATIONS  (STANDING):  cefTRIAXone Injectable. 1000 milliGRAM(s) IV Push every 24 hours  collagenase Ointment 1 Application(s) Topical daily  dextrose 5% 1000 milliLiter(s) (100 mL/Hr) IV Continuous <Continuous>  doxycycline hyclate Capsule 100 milliGRAM(s) Oral every 12 hours  gabapentin 100 milliGRAM(s) Oral three times a day  heparin   Injectable 5000 Unit(s) SubCutaneous every 8 hours  hydroxychloroquine 200 milliGRAM(s) Oral daily  influenza   Vaccine 0.5 milliLiter(s) IntraMuscular once    MEDICATIONS  (PRN):  acetaminophen   Tablet .. 650 milliGRAM(s) Oral every 6 hours PRN Temp greater or equal to 38C (100.4F), Mild Pain (1 - 3)  cloNIDine 0.1 milliGRAM(s) Oral every 8 hours PRN BP sys > 140 or diastolic > 100  melatonin 3 milliGRAM(s) Oral at bedtime PRN Insomnia  ondansetron Injectable 4 milliGRAM(s) IV Push every 8 hours PRN Nausea and/or Vomiting    Assessment/Plan  #Acute renal failure likely prerenal + NSAID's use  #Metabolic acidosis  Nephrology eval appreciated  IVF per renal/ bicarb  Monitor BMP  Avoid NSAID's    #Reported former Heroine abuse  -Patient denies any recent drug use  -UDS positive for cocaine and marijuana    #UTI  Urine c/s pending  Cont IV Rocephin for now till cultures are available  ID eval for further IV antibiotics    #LT knee draining boil  H/o MRSA before  Start PO Doxycycline  ID eval for further abx    #Rheumatoid arthritis/ possible SLE  Rheum eval DR [facility]  Cont Plaquenil for now    #Anemia likely chronic  Will transfuse 2 Units PRBC today  Monitor HH    #Thrombocytopenia- likely chronic  Monitor counts    #HTN- cont current meds    #DVT proph- heparin SQ    #Dispo- MRI negative for osteo. Mild improvement in Cr. Nephrology recs. Continue IVF

## 2021-10-13 NOTE — PROGRESS NOTE ADULT - ASSESSMENT
Chronic Anemia  BRBPRx 1 resolved  REC  Advance Diet  GI workup as Out Pt once acute conditions resolved

## 2021-10-13 NOTE — PROGRESS NOTE ADULT - SUBJECTIVE AND OBJECTIVE BOX
39 female with anemia, HTN, RA, SLE not on treatment and not actively following with her Rheum - was started on hydroxychloroquine and was told to go to ED for evaluation of elevated creatinine of 5.8 where baseline < 1.0 in January .   Has a history of IVDA as well.     Dr Del Rosario for most recent lab draw and sent in for the results. She reports going to this appt as she thought it odd that she had pink urine. Uses meloxicam 15 mg qd, has used aleve x 2 tabs but usually supplements w acetaminophen.    PAST MEDICAL & SURGICAL HISTORY:  Lupus    Rheumatoid arthritis    H/O Raynaud&#x27;s syndrome    Heroin abuse    Smoker    Rheumatoid arthritis    Sepsis    HTN (hypertension)    COPD (chronic obstructive pulmonary disease)    Depression    Epilepsy    GERD (gastroesophageal reflux disease)    Raynaud&#x27;s syndrome without gangrene    Bacteremia    Systemic lupus erythematosus    Aphonia    H/O tubal ligation    H/O appendicitis    Gall bladder stones    H/O tracheostomy    S/P percutaneous endoscopic gastrostomy (PEG) tube placement    MEDICATIONS  (STANDING):  amLODIPine   Tablet 5 milliGRAM(s) Oral daily  cefTRIAXone Injectable. 1000 milliGRAM(s) IV Push every 24 hours  collagenase Ointment 1 Application(s) Topical daily  dextrose 5% 1000 milliLiter(s) (100 mL/Hr) IV Continuous <Continuous>  doxycycline hyclate Capsule 100 milliGRAM(s) Oral every 12 hours  gabapentin 100 milliGRAM(s) Oral three times a day  hydroxychloroquine 200 milliGRAM(s) Oral daily  influenza   Vaccine 0.5 milliLiter(s) IntraMuscular once  pantoprazole Infusion 8 mG/Hr (10 mL/Hr) IV Continuous <Continuous>        Allergies    morphine (Rash)  morphine (Unknown)    Intolerances        SOCIAL HISTORY:  no etoh now   hx of IVDA    FAMILY HISTORY:  No pertinent family history in first degree relatives  cannot recall family history at this time      REVIEW OF SYSTEMS:    CONSTITUTIONAL: stable weakness, fevers or chills  EYES/ENT: No visual changes;  No vertigo or throat pain   NECK: No pain or stiffness  RESPIRATORY: No cough, wheezing, hemoptysis; No shortness of breath  CARDIOVASCULAR: No chest pain or palpitations  GASTROINTESTINAL: No abdominal or epigastric pain. No nausea, vomiting, or hematemesis; No diarrhea or constipation. No melena or hematochezia.  GENITOURINARY: No dysuria, frequency or hematuria  NEUROLOGICAL: No numbness or weakness  SKIN: No itching, burning, rashes, or lesions   All other review of systems is negative unless indicated above.      Vital Signs Last 24 Hrs  T(C): 36.5 (13 Oct 2021 16:17), Max: 36.6 (13 Oct 2021 07:47)  T(F): 97.7 (13 Oct 2021 16:17), Max: 97.9 (13 Oct 2021 07:47)  HR: 87 (13 Oct 2021 16:17) (69 - 87)  BP: 150/108 (13 Oct 2021 16:17) (138/94 - 165/99)  BP(mean): --  RR: 17 (13 Oct 2021 16:17) (17 - 18)  SpO2: 100% (13 Oct 2021 16:17) (99% - 100%)    I&O's Detail    12 Oct 2021 07:01  -  13 Oct 2021 07:00  --------------------------------------------------------  IN:    PRBCs (Packed Red Blood Cells): 321 mL    sodium chloride 0.9%: 700 mL  Total IN: 1021 mL    OUT:    Voided (mL): 200 mL  Total OUT: 200 mL    Total NET: 821 mL      13 Oct 2021 07:01  -  13 Oct 2021 22:21  --------------------------------------------------------  IN:    dextrose 5% w/ Additives: 1200 mL    Pantoprazole: 120 mL  Total IN: 1320 mL    OUT:  Total OUT: 0 mL    Total NET: 1320 mL          PHYSICAL EXAM:    Constitutional: NAD, frail. >then stated age  HEENT: dry MM  Neck: No LAD, No JVD  Respiratory: dist  Cardiovascular: S1 and S2  Gastrointestinal: BS+, soft  Extremities: peripheral edema, le contorted  Neurological: A/O x 3        LABS:    139    |  107    |  99     ----------------------------<  101       13 Oct 2021 09:44  4.4     |  19     |  6.10     134    |  104    |  106    ----------------------------<  108       12 Oct 2021 09:54  4.5     |  15     |  6.51     133    |  101    |  97     ----------------------------<  89        11 Oct 2021 22:25  3.9     |  17     |  6.72     Ca    7.0        13 Oct 2021 09:44  Ca    7.0        12 Oct 2021 09:54    Phos  7.7       13 Oct 2021 09:44  Phos  7.7       12 Oct 2021 09:54      TPro  5.7    /  Alb  1.7    /  TBili  0.2    /        13 Oct 2021 09:44  DBili  x      /  AST  13     /  ALT  <6     /  AlkPhos  37       TPro  7.4    /  Alb  2.5    /  TBili  0.2    /        11 Oct 2021 22:25  DBili  x      /  AST  23     /  ALT  13     /  AlkPhos  60           c3 67   c4 11      Urine Studies:  Urinalysis Basic - ( 11 Oct 2021 22:25 )    Color: Yellow / Appearance: Slightly Turbid / S.015 / pH: x  Gluc: x / Ketone: Negative  / Bili: Negative / Urobili: Negative mg/dL   Blood: x / Protein: 500 mg/dL / Nitrite: Negative   Leuk Esterase: Moderate / RBC: >50 /HPF / WBC 26-50   Sq Epi: x / Non Sq Epi: Occasional / Bacteria: TNTC    urine protein 7 grams         RADIOLOGY & ADDITIONAL STUDIES:

## 2021-10-14 LAB
-  AMIKACIN: SIGNIFICANT CHANGE UP
-  AMOXICILLIN/CLAVULANIC ACID: SIGNIFICANT CHANGE UP
-  AMPICILLIN/SULBACTAM: SIGNIFICANT CHANGE UP
-  AMPICILLIN: SIGNIFICANT CHANGE UP
-  AZTREONAM: SIGNIFICANT CHANGE UP
-  AZTREONAM: SIGNIFICANT CHANGE UP
-  CEFAZOLIN: SIGNIFICANT CHANGE UP
-  CEFEPIME: SIGNIFICANT CHANGE UP
-  CEFOXITIN: SIGNIFICANT CHANGE UP
-  CEFTRIAXONE: SIGNIFICANT CHANGE UP
-  CIPROFLOXACIN: SIGNIFICANT CHANGE UP
-  ERTAPENEM: SIGNIFICANT CHANGE UP
-  GENTAMICIN: SIGNIFICANT CHANGE UP
-  IMIPENEM: SIGNIFICANT CHANGE UP
-  LEVOFLOXACIN: SIGNIFICANT CHANGE UP
-  MEROPENEM: SIGNIFICANT CHANGE UP
-  NITROFURANTOIN: SIGNIFICANT CHANGE UP
-  PIPERACILLIN/TAZOBACTAM: SIGNIFICANT CHANGE UP
-  TIGECYCLINE: SIGNIFICANT CHANGE UP
-  TOBRAMYCIN: SIGNIFICANT CHANGE UP
-  TRIMETHOPRIM/SULFAMETHOXAZOLE: SIGNIFICANT CHANGE UP
ANION GAP SERPL CALC-SCNC: 13 MMOL/L — SIGNIFICANT CHANGE UP (ref 5–17)
BASOPHILS # BLD AUTO: 0.02 K/UL — SIGNIFICANT CHANGE UP (ref 0–0.2)
BASOPHILS NFR BLD AUTO: 0.4 % — SIGNIFICANT CHANGE UP (ref 0–2)
BUN SERPL-MCNC: 89 MG/DL — HIGH (ref 7–23)
CALCIUM SERPL-MCNC: 6.8 MG/DL — LOW (ref 8.5–10.1)
CHLORIDE SERPL-SCNC: 103 MMOL/L — SIGNIFICANT CHANGE UP (ref 96–108)
CO2 SERPL-SCNC: 20 MMOL/L — LOW (ref 22–31)
CREAT SERPL-MCNC: 5.5 MG/DL — HIGH (ref 0.5–1.3)
CULTURE RESULTS: SIGNIFICANT CHANGE UP
CULTURE RESULTS: SIGNIFICANT CHANGE UP
DSDNA AB SER-ACNC: 137 IU/ML — HIGH
EOSINOPHIL # BLD AUTO: 0.18 K/UL — SIGNIFICANT CHANGE UP (ref 0–0.5)
EOSINOPHIL NFR BLD AUTO: 3.8 % — SIGNIFICANT CHANGE UP (ref 0–6)
GLUCOSE SERPL-MCNC: 148 MG/DL — HIGH (ref 70–99)
HCT VFR BLD CALC: 28.5 % — LOW (ref 34.5–45)
HGB BLD-MCNC: 9.3 G/DL — LOW (ref 11.5–15.5)
IMM GRANULOCYTES NFR BLD AUTO: 0.6 % — SIGNIFICANT CHANGE UP (ref 0–1.5)
LYMPHOCYTES # BLD AUTO: 0.98 K/UL — LOW (ref 1–3.3)
LYMPHOCYTES # BLD AUTO: 20.5 % — SIGNIFICANT CHANGE UP (ref 13–44)
MAGNESIUM SERPL-MCNC: 1.2 MG/DL — LOW (ref 1.6–2.6)
MCHC RBC-ENTMCNC: 27.6 PG — SIGNIFICANT CHANGE UP (ref 27–34)
MCHC RBC-ENTMCNC: 32.6 GM/DL — SIGNIFICANT CHANGE UP (ref 32–36)
MCV RBC AUTO: 84.6 FL — SIGNIFICANT CHANGE UP (ref 80–100)
METHOD TYPE: SIGNIFICANT CHANGE UP
MONOCYTES # BLD AUTO: 0.34 K/UL — SIGNIFICANT CHANGE UP (ref 0–0.9)
MONOCYTES NFR BLD AUTO: 7.1 % — SIGNIFICANT CHANGE UP (ref 2–14)
NEUTROPHILS # BLD AUTO: 3.22 K/UL — SIGNIFICANT CHANGE UP (ref 1.8–7.4)
NEUTROPHILS NFR BLD AUTO: 67.6 % — SIGNIFICANT CHANGE UP (ref 43–77)
ORGANISM # SPEC MICROSCOPIC CNT: SIGNIFICANT CHANGE UP
ORGANISM # SPEC MICROSCOPIC CNT: SIGNIFICANT CHANGE UP
PHOSPHATE SERPL-MCNC: 5.3 MG/DL — HIGH (ref 2.5–4.5)
PLATELET # BLD AUTO: 181 K/UL — SIGNIFICANT CHANGE UP (ref 150–400)
POTASSIUM SERPL-MCNC: 3.6 MMOL/L — SIGNIFICANT CHANGE UP (ref 3.5–5.3)
POTASSIUM SERPL-SCNC: 3.6 MMOL/L — SIGNIFICANT CHANGE UP (ref 3.5–5.3)
RBC # BLD: 3.37 M/UL — LOW (ref 3.8–5.2)
RBC # FLD: 15.2 % — HIGH (ref 10.3–14.5)
SODIUM SERPL-SCNC: 136 MMOL/L — SIGNIFICANT CHANGE UP (ref 135–145)
SPECIMEN SOURCE: SIGNIFICANT CHANGE UP
SPECIMEN SOURCE: SIGNIFICANT CHANGE UP
WBC # BLD: 4.77 K/UL — SIGNIFICANT CHANGE UP (ref 3.8–10.5)
WBC # FLD AUTO: 4.77 K/UL — SIGNIFICANT CHANGE UP (ref 3.8–10.5)

## 2021-10-14 PROCEDURE — 99233 SBSQ HOSP IP/OBS HIGH 50: CPT

## 2021-10-14 PROCEDURE — 99255 IP/OBS CONSLTJ NEW/EST HI 80: CPT

## 2021-10-14 RX ORDER — MAGNESIUM SULFATE 500 MG/ML
2 VIAL (ML) INJECTION EVERY 6 HOURS
Refills: 0 | Status: COMPLETED | OUTPATIENT
Start: 2021-10-14 | End: 2021-10-14

## 2021-10-14 RX ORDER — OXYCODONE AND ACETAMINOPHEN 5; 325 MG/1; MG/1
2 TABLET ORAL EVERY 6 HOURS
Refills: 0 | Status: DISCONTINUED | OUTPATIENT
Start: 2021-10-14 | End: 2021-10-21

## 2021-10-14 RX ORDER — PANTOPRAZOLE SODIUM 20 MG/1
40 TABLET, DELAYED RELEASE ORAL
Refills: 0 | Status: DISCONTINUED | OUTPATIENT
Start: 2021-10-14 | End: 2021-10-27

## 2021-10-14 RX ORDER — ALBUTEROL 90 UG/1
2.5 AEROSOL, METERED ORAL EVERY 6 HOURS
Refills: 0 | Status: DISCONTINUED | OUTPATIENT
Start: 2021-10-14 | End: 2021-10-14

## 2021-10-14 RX ORDER — ALBUTEROL 90 UG/1
2 AEROSOL, METERED ORAL EVERY 6 HOURS
Refills: 0 | Status: DISCONTINUED | OUTPATIENT
Start: 2021-10-14 | End: 2021-10-27

## 2021-10-14 RX ORDER — ALBUTEROL 90 UG/1
1 AEROSOL, METERED ORAL EVERY 6 HOURS
Refills: 0 | Status: DISCONTINUED | OUTPATIENT
Start: 2021-10-14 | End: 2021-10-27

## 2021-10-14 RX ADMIN — PANTOPRAZOLE SODIUM 10 MG/HR: 20 TABLET, DELAYED RELEASE ORAL at 03:32

## 2021-10-14 RX ADMIN — Medication 1 APPLICATION(S): at 11:38

## 2021-10-14 RX ADMIN — Medication 100 MILLIGRAM(S): at 21:20

## 2021-10-14 RX ADMIN — ALBUTEROL 2 PUFF(S): 90 AEROSOL, METERED ORAL at 21:42

## 2021-10-14 RX ADMIN — OXYCODONE AND ACETAMINOPHEN 2 TABLET(S): 5; 325 TABLET ORAL at 14:05

## 2021-10-14 RX ADMIN — AMLODIPINE BESYLATE 5 MILLIGRAM(S): 2.5 TABLET ORAL at 09:12

## 2021-10-14 RX ADMIN — Medication 50 GRAM(S): at 18:14

## 2021-10-14 RX ADMIN — Medication 100 MILLIGRAM(S): at 09:12

## 2021-10-14 RX ADMIN — OXYCODONE AND ACETAMINOPHEN 2 TABLET(S): 5; 325 TABLET ORAL at 21:50

## 2021-10-14 RX ADMIN — GABAPENTIN 100 MILLIGRAM(S): 400 CAPSULE ORAL at 14:04

## 2021-10-14 RX ADMIN — Medication 50 GRAM(S): at 11:39

## 2021-10-14 RX ADMIN — Medication 200 MILLIGRAM(S): at 09:12

## 2021-10-14 RX ADMIN — GABAPENTIN 100 MILLIGRAM(S): 400 CAPSULE ORAL at 21:20

## 2021-10-14 RX ADMIN — PANTOPRAZOLE SODIUM 40 MILLIGRAM(S): 20 TABLET, DELAYED RELEASE ORAL at 11:38

## 2021-10-14 RX ADMIN — SODIUM CHLORIDE 100 MILLILITER(S): 9 INJECTION, SOLUTION INTRAVENOUS at 00:01

## 2021-10-14 RX ADMIN — CEFTRIAXONE 1000 MILLIGRAM(S): 500 INJECTION, POWDER, FOR SOLUTION INTRAMUSCULAR; INTRAVENOUS at 21:20

## 2021-10-14 RX ADMIN — OXYCODONE AND ACETAMINOPHEN 2 TABLET(S): 5; 325 TABLET ORAL at 21:20

## 2021-10-14 RX ADMIN — Medication 650 MILLIGRAM(S): at 23:57

## 2021-10-14 RX ADMIN — GABAPENTIN 100 MILLIGRAM(S): 400 CAPSULE ORAL at 05:57

## 2021-10-14 RX ADMIN — SODIUM CHLORIDE 100 MILLILITER(S): 9 INJECTION, SOLUTION INTRAVENOUS at 22:18

## 2021-10-14 RX ADMIN — SODIUM CHLORIDE 100 MILLILITER(S): 9 INJECTION, SOLUTION INTRAVENOUS at 12:05

## 2021-10-14 NOTE — PROGRESS NOTE ADULT - SUBJECTIVE AND OBJECTIVE BOX
39 female with anemia, HTN, RA, SLE not on treatment and not actively following with her Rheum - was started on hydroxychloroquine and was told to go to ED for evaluation of elevated creatinine of 5.8 where baseline < 1.0 in January .   Has a history of IVDA as well.     Dr Del Rosario for most recent lab draw and sent in for the results. She reports going to this appt as she thought it odd that she had pink urine. Uses meloxicam 15 mg qd, has used aleve x 2 tabs but usually supplements w acetaminophen.    Today    profuse diarrhea        PAST MEDICAL & SURGICAL HISTORY:  Lupus    Rheumatoid arthritis    H/O Raynaud&#x27;s syndrome    Heroin abuse    Smoker    Rheumatoid arthritis    Sepsis    HTN (hypertension)    COPD (chronic obstructive pulmonary disease)    Depression    Epilepsy    GERD (gastroesophageal reflux disease)    Raynaud&#x27;s syndrome without gangrene    Bacteremia    Systemic lupus erythematosus    Aphonia    H/O tubal ligation    H/O appendicitis    Gall bladder stones    H/O tracheostomy    S/P percutaneous endoscopic gastrostomy (PEG) tube placement    MEDICATIONS  (STANDING):  amLODIPine   Tablet 5 milliGRAM(s) Oral daily  cefTRIAXone Injectable. 1000 milliGRAM(s) IV Push every 24 hours  collagenase Ointment 1 Application(s) Topical daily  dextrose 5% 1000 milliLiter(s) (100 mL/Hr) IV Continuous <Continuous>  doxycycline hyclate Capsule 100 milliGRAM(s) Oral every 12 hours  gabapentin 100 milliGRAM(s) Oral three times a day  hydroxychloroquine 200 milliGRAM(s) Oral daily  influenza   Vaccine 0.5 milliLiter(s) IntraMuscular once  pantoprazole Infusion 8 mG/Hr (10 mL/Hr) IV Continuous <Continuous>        Allergies    morphine (Rash)  morphine (Unknown)    Intolerances        SOCIAL HISTORY:  no etoh now   hx of IVDA    FAMILY HISTORY:  No pertinent family history in first degree relatives  cannot recall family history at this time      REVIEW OF SYSTEMS:    CONSTITUTIONAL: stable weakness, fevers or chills  EYES/ENT: No visual changes;  No vertigo or throat pain   NECK: No pain or stiffness  RESPIRATORY: No cough, wheezing, hemoptysis; No shortness of breath  CARDIOVASCULAR: No chest pain or palpitations  GASTROINTESTINAL: No abdominal or epigastric pain. No nausea, vomiting, or hematemesis; No diarrhea or constipation. No melena or hematochezia.  GENITOURINARY: No dysuria, frequency or hematuria  NEUROLOGICAL: No numbness or weakness  SKIN: No itching, burning, rashes, or lesions   All other review of systems is negative unless indicated above.      Vital Signs Last 24 Hrs  T(C): 37.3 (14 Oct 2021 16:12), Max: 37.3 (14 Oct 2021 16:12)  T(F): 99.2 (14 Oct 2021 16:12), Max: 99.2 (14 Oct 2021 16:12)  HR: 102 (14 Oct 2021 16:12) (73 - 102)  BP: 140/100 (14 Oct 2021 16:12) (140/100 - 157/104)  BP(mean): --  RR: 17 (14 Oct 2021 16:12) (17 - 17)  SpO2: 100% (14 Oct 2021 16:12) (100% - 100%)      I&O's Detail    13 Oct 2021 07:01  -  14 Oct 2021 07:00  --------------------------------------------------------  IN:    dextrose 5% w/ Additives: 1200 mL    Pantoprazole: 120 mL  Total IN: 1320 mL    OUT:  Total OUT: 0 mL    Total NET: 1320 mL            PHYSICAL EXAM:    Constitutional: NAD, frail. >then stated age  HEENT: dry MM  Neck: No LAD, No JVD  Respiratory: dist  Cardiovascular: S1 and S2  Gastrointestinal: BS+, soft  Extremities: peripheral edema, le contorted  Neurological: A/O x 3        LABS:      136    |  103    |  89     ----------------------------<  148       14 Oct 2021 08:39  3.6     |  20     |  5.50     139    |  107    |  99     ----------------------------<  101       13 Oct 2021 09:44  4.4     |  19     |  6.10     134    |  104    |  106    ----------------------------<  108       12 Oct 2021 09:54  4.5     |  15     |  6.51     Ca    6.8        14 Oct 2021 08:39  Ca    7.0        13 Oct 2021 09:44    Phos  5.3       14 Oct 2021 08:39  Phos  7.7       13 Oct 2021 09:44    Mg     1.2       14 Oct 2021 08:39    TPro  5.7    /  Alb  1.7    /  TBili  0.2    /        13 Oct 2021 09:44  DBili  x      /  AST  13     /  ALT  <6     /  AlkPhos  37       TPro  7.4    /  Alb  2.5    /  TBili  0.2    /        11 Oct 2021 22:25  DBili  x      /  AST  23     /  ALT  13     /  AlkPhos  60                                  9.3    4.77  )-----------( 181      ( 14 Oct 2021 08:39 )             28.5                         8.8    4.72  )-----------( 125      ( 13 Oct 2021 09:44 )             26.5     c3 67   c4 11      Urine Studies:  Urinalysis Basic - ( 11 Oct 2021 22:25 )    Color: Yellow / Appearance: Slightly Turbid / S.015 / pH: x  Gluc: x / Ketone: Negative  / Bili: Negative / Urobili: Negative mg/dL   Blood: x / Protein: 500 mg/dL / Nitrite: Negative   Leuk Esterase: Moderate / RBC: >50 /HPF / WBC 26-50   Sq Epi: x / Non Sq Epi: Occasional / Bacteria: TNTC    urine protein 7 grams         RADIOLOGY & ADDITIONAL STUDIES:

## 2021-10-14 NOTE — PROGRESS NOTE ADULT - SUBJECTIVE AND OBJECTIVE BOX
40y old  Female who presents with a chief complaint of UTI/fatiuge  acute renal failure (12 Oct 2021 12:34)   Subjective; More energy today. Denies fever, chills, chest pain, SOB. Baseline diffuse body/joint pain. Decreased urinary frequency reported  Review of Systems: 14 Point review of systems reviewed and reported as negative unless otherwise stated in Subjective    "HPI:  40 year old female w hx anemia, HTN, RA, SLE on hydroxychloroquine was told to go to ED for evaluation of elevated creatinine  Cr 10- was 5.8 where baseline < 1.0  Has followed up w rheum q 3 months  mobility has changed given bone on bone w knees and shoulders and wrists; not able to use crutches or a walker so has been using a wheelchair  Uses meloxicam 15 mg qd  last week she used aleve x 2 tabs but usually supplements w acetaminophen  due to chronic pain she was to start gabapentin  she also just picked up her prescription for a new antihypertenive, lisinopril she has not started yet  In ED /109   HR 88    RR 18    T98.2  100% sat RA  Cr 6.72  given 30 cc/kg NS 1500 cc  abn UA w pyuria and microscopic hematuria  ceftriaxone"        PAST MEDICAL HX  Aphonia hx  Bacteremia   COPD (chronic obstructive pulmonary disease)   Depression   Epilepsy   GERD (gastroesophageal reflux disease)    Heroin abuse history  HTN (hypertension)  Hx intubation w acute hypoxic resp failure, asp    Hx MRSA  Raynaud's syndrome without gangrene   RA rheumatoid arthritis   Sepsis   Smoker   SLE systemic lupus erythematosus.     PAST SURGICAL HX  Gall bladder stones   H/O appendicitis   H/O tracheostomy 2020  H/O tubal ligation   S/P percutaneous endoscopic gastrostomy (PEG) tube placement.2020     FAMILY HX  Heart Disease: Mother  Father: unknown  No Family Hx of: diagnosed with Diabetes, Cancer, Hypertension,   Stroke, Mental Illness    SOCIAL HX  former smoker  no alcohol  once a month marijuana (12 Oct 2021 02:15)          LABS:                           9.3    4.77  )-----------( 181      ( 14 Oct 2021 08:39 )             28.5     10-    136  |  103  |  89<H>  ----------------------------<  148<H>  3.6   |  20<L>  |  5.50<H>    Ca    6.8<L>      14 Oct 2021 08:39  Phos  5.3     10-  Mg     1.2     10-    TPro  5.7<L>  /  Alb  1.7<L>  /  TBili  0.2  /  DBili  x   /  AST  13<L>  /  ALT  <6<L>  /  AlkPhos  37<L>  10    COVID-19 PCR: NotDetec (11 Oct 2021 22:25)    CAPILLARY BLOOD GLUCOSE      Cocaine Metabolite, Urine: Positive (10.13.21 @ 11:30)   Cocaine Metabolite, Urine: Positive (20 @ 05:41) THC, Urine Qualitative: Positive (10.13.21 @ 11:30)   THC, Urine Qualitative: Negative (20 @ 05:41) C4 Complement, Serum: 11 mg/dL (10.13.21 @ 09:44)   C4 Complement, Serum: 10 mg/dL (20 @ 05:54) C3 Complement, Serum: 67 mg/dL (10.13.21 @ 09:44)   C3 Complement, Serum: 54 mg/dL (20 @ 05:54) < from: MR Pelvis Bony Only No Cont (10 @ 14:58) >  IMPRESSION:  1.  Sacral decubitus ulcer, as described above.  2.  No MR evidence of acute osteomyelitis.  3.  Moderate to large amount of pelvic free fluid.    < end of copied text >  < from: MR Pelvis Bony Only No Cont (10 @ 14:58) >  FINDINGS:    Soft Tissues: A decubitus ulcer is identified along the rightward aspect of the sacrum overlying the S4-coccyx levels. The soft tissue wound measures approximately 39 x 72 mm. The wound extends to the level of the subcutaneous fat and approaches the posterior cortex of the sacrum/coccyx. There is no associated abscess or drainable fluid collection.    < end of copied text >      Culture - Urine (collected 11 Oct 2021 22:25)  Source: Clean Catch None  Preliminary Report (13 Oct 2021 21:39):    >100,000 CFU/ml Enterobacter cloacae complex                                  8.8    4.72  )-----------( 125      ( 13 Oct 2021 09:44 )             26.5     10-13    139  |  107  |  99<H>  ----------------------------<  101<H>  4.4   |  19<L>  |  6.10<H>    Ca    7.0<L>      13 Oct 2021 09:44  Phos  7.7     10-13    TPro  5.7<L>  /  Alb  1.7<L>  /  TBili  0.2  /  DBili  x   /  AST  13<L>  /  ALT  <6<L>  /  AlkPhos  37<L>  10    COVID-19 PCR: NotDetec (11 Oct 2021 22:25)    CAPILLARY BLOOD GLUCOSE          Culture - Urine (collected 11 Oct 2021 22:25)  Source: Clean Catch None  Preliminary Report (13 Oct 2021 21:39):    >100,000 CFU/ml Enterobacter cloacae complex                   6.6    4.22  )-----------( 82       ( 12 Oct 2021 09:54 )             20.0     10-12    134<L>  |  104  |  106<H>  Cocaine Metabolite, Urine: Positive (10.13.21 @ 11:30) THC, Urine Qualitative: Positive (10.13.21 @ 11:30) Protein/Creatinine Ratio, Urine (10.13.21 @ 11:30)   Protein/Creatinine Ratio Calculation: 7.5 Ratio   Total Protein, Random Urine: 241 mg/dL   Creatinine, Random Urine: 32:C-Reactive Protein, Serum: 55 mg/L (10.13. @ 09:45) Sedimentation Rate, Erythrocyte: 25 mm/hrC4 Complement, Serum: 11 mg/dL (10.13.21 @ 09:44) C3 Complement, Serum: 67 mg/dL (10.13.21 @ 09:44) ----------------------------<  108<H>  4.5   |  15<L>  |  6.51<H>    Ca    7.0<L>      12 Oct 2021 09:54  Phos  7.7     10-12    < from: MR Pelvis Bony Only No Cont (10.13.21 @ 14:58) >  IMPRESSION:  1.  Sacral decubitus ulcer, as described above.  2.  No MR evidence of acute osteomyelitis.  3.  Moderate to large amount of pelvic free fluid.    < end of copied text >  < from: CT Abdomen and Pelvis No Cont (10.12.21 @ 00:01) >  IMPRESSION: Limited evaluation without intravenous contrast.    4 mm right lower lobe nodule (2:7). New since previous exam. This is indeterminant.    Subtletree-in-bud type opacities in the right lower lobe almost completely resolved when compared to the previous exam from 2020.    No hydronephrosis. 2 mm nonobstructing left renal mid segment calcification.    Ill-defined left para-aortic soft tissue may represent lymphadenopathy. This is very poorly evaluated on this exam. Mildly enlarged bilateral pelvic and inguinal lymph nodes.. Further evaluation is needed contrast-enhanced examination is recommended.    < end of copied text >  < from: 12 Lead ECG (10.11.21 @ 23:11) >    Diagnosis Line Normal sinus rhythm  Normal ECG    < end of copied text >  TPro  7.4  /  Alb  2.5<L>  /  TBili  0.2  /  DBili  x   /  AST  23  /  ALT  13  /  AlkPhos  60  10-11    Urinalysis Basic - ( 11 Oct 2021 22:25 )  Color: Yellow / Appearance: Slightly Turbid / S.015 / pH: x  Gluc: x / Ketone: Negative  / Bili: Negative / Urobili: Negative mg/dL   Blood: x / Protein: 500 mg/dL / Nitrite: Negative   Leuk Esterase: Moderate / RBC: >50 /HPF / WBC 26-50   Sq Epi: x / Non Sq Epi: Occasional / Bacteria: TNTC    CARDIAC MARKERS ( 11 Oct 2021 22:25 )  x     / x     / 142 U/L / x     / x        RADIOLOGY & ADDITIONAL TESTS:  EXAM:  CT ABDOMEN AND PELVIS                        PROCEDURE DATE:  10/12/2021      INTERPRETATION:  CLINICAL INFORMATION: NATALIA with hematuria    COMPARISON: CT abdomen pelvis 2020. CT chest 2020    CONTRAST/COMPLICATIONS:  IV Contrast: NONE  Oral Contrast: NONE  Complications: None reported at time of study completion    PROCEDURE:  CT of the Abdomen and Pelvis was performed.  Sagittal and coronal reformats were performed.    FINDINGS:  LOWER CHEST: The cardiac septum is dense relative to the chambers consistent with anemia.  4 mm right lower lobe nodule (2:7). New since previous exam  Subtle tree-in-bud type opacities in the right lower lobe almost completely resolved when compared to the previous exam from 2020.    LIVER: Within normal limits.  BILE DUCTS: Normal caliber.  GALLBLADDER: Cholecystectomy.  SPLEEN: Within normal limits.  PANCREAS: Within normal limits.  ADRENALS: Within normal limits.  KIDNEYS/URETERS: No hydronephrosis. 3 mm nonobstructing left renal mid segment calcification.    BLADDER: Within normal limits.  REPRODUCTIVE ORGANS: Very poorly visualized.    BOWEL: No bowel obstruction. Appendix is not visualized. No evidence of inflammation in the pericecal region.  PERITONEUM: Small amount of pelvic ascites.  VESSELS: Within normal limits.  RETROPERITONEUM/LYMPH NODES: Ill-defined left para-aortic soft tissue may represent lymphadenopathy. This is very poorly evaluated on this exam. Mildly enlarged bilateral pelvic and inguinal lymph nodes.  ABDOMINAL WALL: Mild anasarca.  BONES: Degenerative changes of the lumbosacral spine.    IMPRESSION: Limited evaluation without intravenous contrast.    4 mm right lower lobe nodule (2:7). New since previous exam. This is indeterminant.    Subtletree-in-bud type opacities in the right lower lobe almost completely resolved when compared to the previous exam from 2020.    No hydronephrosis. 2 mm nonobstructing left renal mid segment calcification.    Ill-defined left para-aortic soft tissue may represent lymphadenopathy. This is very poorly evaluated on this exam. Mildly enlarged bilateral pelvic and inguinal lymph nodes.. Further evaluation is needed contrast-enhanced examination is recommended.      PHYSICAL EXAM:  T(C): 36.5 (10-13-21 @ 16:17), Max: 36.6 (10-13-21 @ 07:47)  HR: 87 (10-13-21 @ 16:17) (69 - 87)  BP: 150/108 (10-13-21 @ 16:17) (138/94 - 165/99)  RR: 17 (10-13-21 @ 16:17) (17 - 18)  SpO2: 100% (10-13-21 @ 16:17) (99% - 100%)  GENERAL: NAD, cachectic, pale  HEAD:  Atraumatic, Normocephalic  EYES: EOMI, PERRL, conjunctiva and sclera clear  HEENT: Dry mucous membranes,  NECK: Supple, No JVD  NERVOUS SYSTEM:  Alert & Oriented X3, Motor Strength 5/5 B/L upper and lower extremities; DTRs 2+ intact and symmetric  CHEST/LUNG: Clear to auscultation bilaterally; No rales, rhonchi, wheezing, or rubs  HEART: Regular rate and rhythm; No murmurs, rubs, or gallops  ABDOMEN: Soft, Nontender, Nondistended; Bowel sounds present  GENITOURINARY- Voiding, no palpable bladder  EXTREMITIES:  No edema.   MUSCULOSKELTAL- Multiple joint deformities noted. LT knee contracted and unable to extend. RT wrist in splint  SKIN- dressing removed from LT knee and noted lateral boil actively draining pus. dressing over the coccyx for decubitus. Multiple skin scars from prior skin abscesses.   CNS- alert, oriented X3, non focal     Daily Height in cm: 165.1 (11 Oct 2021 21:06)      MEDICATIONS  (STANDING):  cefTRIAXone Injectable. 1000 milliGRAM(s) IV Push every 24 hours  collagenase Ointment 1 Application(s) Topical daily  dextrose 5% 1000 milliLiter(s) (100 mL/Hr) IV Continuous <Continuous>  doxycycline hyclate Capsule 100 milliGRAM(s) Oral every 12 hours  gabapentin 100 milliGRAM(s) Oral three times a day  heparin   Injectable 5000 Unit(s) SubCutaneous every 8 hours  hydroxychloroquine 200 milliGRAM(s) Oral daily  influenza   Vaccine 0.5 milliLiter(s) IntraMuscular once    MEDICATIONS  (PRN):  acetaminophen   Tablet .. 650 milliGRAM(s) Oral every 6 hours PRN Temp greater or equal to 38C (100.4F), Mild Pain (1 - 3)  cloNIDine 0.1 milliGRAM(s) Oral every 8 hours PRN BP sys > 140 or diastolic > 100  melatonin 3 milliGRAM(s) Oral at bedtime PRN Insomnia  ondansetron Injectable 4 milliGRAM(s) IV Push every 8 hours PRN Nausea and/or Vomiting    Assessment/Plan  #Acute renal failure likely prerenal + NSAID's use  #Metabolic acidosis  #Proteinuria/Hematuria  Nephrology eval appreciated  IVF per renal/ bicarb  Monitor BMP  Avoid NSAID's  Possible SLE renal involvement? Kidny biopsy when cleared from Infectious standpoint for procedure     #Reported former Heroine abuse  -Patient denies any recent drug use despite positive UDS  -UDS positive for cocaine and marijuana    #UTI  Urine c/s  with >100K Enterobacter  Cont IV Rocephin for now till cultures are available  ID eval for further IV antibiotics    #LT knee draining boil. Stable  H/o MRSA before  Start PO Doxycycline  ID eval for further abx    #Rheumatoid arthritis/ possible SLE  Rheum eval DR Quintana group  Cont Plaquenil for now  Chronic pain + Hx of drug abuse. At this time patient has objective reasons for pain and pain control with opioids warranted but will avoid rapid uptitration to avoid recurrence/signficant dependency.     #Anemia likely chronic  Will transfuse 2 Units PRBC today  Monitor HH    #Thrombocytopenia- likely chronic  Monitor counts    #HTN- cont current meds    #DVT proph- heparin SQ    #Dispo- MRI negative for osteo. Mild improvement in Cr. Nephrology recs. Continue IVF. Abx for UTI as per ID. Plan for renal biopsy probably early next week.

## 2021-10-14 NOTE — PROGRESS NOTE ADULT - ASSESSMENT
39 female with anemia, HTN, RA, SLE was told to go to ED for evaluation of elevated creatinine from rheum office after long absence of followup   Cr 10- was 5.8 where baseline < 1.0.     NATALIA - Cr slowly tredning down   -Pre-renal/NSAID related  vs other consideration is lupus nephritis with proteinuria and hematuria noted  serologies noted   proteinuria 7 grams   Urine culture > 100K - on iV abx per ID   monitor renal trends  no acute indication for HD now as Cr trends down     UTI + enterobacter - hold renal bx and await ID clearance - will plan for renal bx early next week - discussed with Dr Hancock  check ds dna, marilu  ,anca  continue IVF w NATALIA and diarrhea  Lupus serologies as above   Rheum consult  Hyperphoshatemia - Phos improved     Anemia  Managment per medicine  PRBC     * pt seen earlier today , note now    39 female with anemia, HTN, RA, SLE was told to go to ED for evaluation of elevated creatinine from rheum office after long absence of followup   Cr 10- was 5.8 where baseline < 1.0.     NATALIA - Cr slowly tredning down    Multifactorial etio  -Pre-renal/NSAID related  vs other consideration is lupus nephritis with proteinuria and hematuria noted, urine tox + cocaine/heroin - drug use related ?     serologies noted   proteinuria 7 grams   Urine culture > 100K - on iV abx per ID   monitor renal trends  no acute indication for HD now as Cr trends down       UTI + enterobacter - hold renal bx and await ID clearance - will plan for renal bx early next week - discussed with Dr Hancock  blood cx pending   check ds dna, marilu  ,anca  continue IVF w NATALIA and diarrhea  Lupus serologies as above   Rheum consult  Hyperphoshatemia - Phos improved     Anemia  Managment per medicine  PRBC     * pt seen earlier today , note now

## 2021-10-15 DIAGNOSIS — Z71.89 OTHER SPECIFIED COUNSELING: ICD-10-CM

## 2021-10-15 LAB
-  STAPHYLOCOCCUS EPIDERMIDIS, METHICILLIN RESISTANT: SIGNIFICANT CHANGE UP
ANION GAP SERPL CALC-SCNC: 10 MMOL/L — SIGNIFICANT CHANGE UP (ref 5–17)
BASOPHILS # BLD AUTO: 0.02 K/UL — SIGNIFICANT CHANGE UP (ref 0–0.2)
BASOPHILS NFR BLD AUTO: 0.4 % — SIGNIFICANT CHANGE UP (ref 0–2)
BUN SERPL-MCNC: 83 MG/DL — HIGH (ref 7–23)
C DIFF BY PCR RESULT: SIGNIFICANT CHANGE UP
C DIFF TOX GENS STL QL NAA+PROBE: SIGNIFICANT CHANGE UP
CALCIUM SERPL-MCNC: 6.8 MG/DL — LOW (ref 8.5–10.1)
CHLORIDE SERPL-SCNC: 99 MMOL/L — SIGNIFICANT CHANGE UP (ref 96–108)
CO2 SERPL-SCNC: 28 MMOL/L — SIGNIFICANT CHANGE UP (ref 22–31)
CREAT SERPL-MCNC: 5.5 MG/DL — HIGH (ref 0.5–1.3)
CULTURE RESULTS: SIGNIFICANT CHANGE UP
DSDNA AB FLD-ACNC: 2.6 AI — HIGH
ENA SS-A AB FLD IA-ACNC: >8 AI — HIGH
EOSINOPHIL # BLD AUTO: 0.35 K/UL — SIGNIFICANT CHANGE UP (ref 0–0.5)
EOSINOPHIL NFR BLD AUTO: 6.3 % — HIGH (ref 0–6)
GLUCOSE SERPL-MCNC: 98 MG/DL — SIGNIFICANT CHANGE UP (ref 70–99)
GRAM STN FLD: SIGNIFICANT CHANGE UP
HCT VFR BLD CALC: 27.1 % — LOW (ref 34.5–45)
HGB BLD-MCNC: 8.9 G/DL — LOW (ref 11.5–15.5)
IMM GRANULOCYTES NFR BLD AUTO: 0.7 % — SIGNIFICANT CHANGE UP (ref 0–1.5)
LYMPHOCYTES # BLD AUTO: 1.06 K/UL — SIGNIFICANT CHANGE UP (ref 1–3.3)
LYMPHOCYTES # BLD AUTO: 19.2 % — SIGNIFICANT CHANGE UP (ref 13–44)
MAGNESIUM SERPL-MCNC: 1.8 MG/DL — SIGNIFICANT CHANGE UP (ref 1.6–2.6)
MCHC RBC-ENTMCNC: 27.7 PG — SIGNIFICANT CHANGE UP (ref 27–34)
MCHC RBC-ENTMCNC: 32.8 GM/DL — SIGNIFICANT CHANGE UP (ref 32–36)
MCV RBC AUTO: 84.4 FL — SIGNIFICANT CHANGE UP (ref 80–100)
METHOD TYPE: SIGNIFICANT CHANGE UP
MONOCYTES # BLD AUTO: 0.57 K/UL — SIGNIFICANT CHANGE UP (ref 0–0.9)
MONOCYTES NFR BLD AUTO: 10.3 % — SIGNIFICANT CHANGE UP (ref 2–14)
NEUTROPHILS # BLD AUTO: 3.49 K/UL — SIGNIFICANT CHANGE UP (ref 1.8–7.4)
NEUTROPHILS NFR BLD AUTO: 63.1 % — SIGNIFICANT CHANGE UP (ref 43–77)
ORGANISM # SPEC MICROSCOPIC CNT: SIGNIFICANT CHANGE UP
ORGANISM # SPEC MICROSCOPIC CNT: SIGNIFICANT CHANGE UP
PHOSPHATE SERPL-MCNC: 4.6 MG/DL — HIGH (ref 2.5–4.5)
PLATELET # BLD AUTO: 214 K/UL — SIGNIFICANT CHANGE UP (ref 150–400)
POTASSIUM SERPL-MCNC: 3.1 MMOL/L — LOW (ref 3.5–5.3)
POTASSIUM SERPL-SCNC: 3.1 MMOL/L — LOW (ref 3.5–5.3)
RBC # BLD: 3.21 M/UL — LOW (ref 3.8–5.2)
RBC # FLD: 14.9 % — HIGH (ref 10.3–14.5)
RHEUMATOID FACT SERPL-ACNC: <10 IU/ML — SIGNIFICANT CHANGE UP (ref 0–13)
SODIUM SERPL-SCNC: 137 MMOL/L — SIGNIFICANT CHANGE UP (ref 135–145)
SPECIMEN SOURCE: SIGNIFICANT CHANGE UP
WBC # BLD: 5.53 K/UL — SIGNIFICANT CHANGE UP (ref 3.8–10.5)
WBC # FLD AUTO: 5.53 K/UL — SIGNIFICANT CHANGE UP (ref 3.8–10.5)

## 2021-10-15 PROCEDURE — 93306 TTE W/DOPPLER COMPLETE: CPT | Mod: 26

## 2021-10-15 PROCEDURE — 99233 SBSQ HOSP IP/OBS HIGH 50: CPT

## 2021-10-15 RX ORDER — HEPARIN SODIUM 5000 [USP'U]/ML
5000 INJECTION INTRAVENOUS; SUBCUTANEOUS EVERY 12 HOURS
Refills: 0 | Status: DISCONTINUED | OUTPATIENT
Start: 2021-10-15 | End: 2021-10-19

## 2021-10-15 RX ORDER — LOPERAMIDE HCL 2 MG
2 TABLET ORAL THREE TIMES A DAY
Refills: 0 | Status: DISCONTINUED | OUTPATIENT
Start: 2021-10-15 | End: 2021-10-27

## 2021-10-15 RX ORDER — VANCOMYCIN HCL 1 G
750 VIAL (EA) INTRAVENOUS EVERY 12 HOURS
Refills: 0 | Status: DISCONTINUED | OUTPATIENT
Start: 2021-10-15 | End: 2021-10-17

## 2021-10-15 RX ORDER — AMLODIPINE BESYLATE 2.5 MG/1
10 TABLET ORAL DAILY
Refills: 0 | Status: DISCONTINUED | OUTPATIENT
Start: 2021-10-15 | End: 2021-10-27

## 2021-10-15 RX ORDER — AMLODIPINE BESYLATE 2.5 MG/1
5 TABLET ORAL ONCE
Refills: 0 | Status: COMPLETED | OUTPATIENT
Start: 2021-10-15 | End: 2021-10-15

## 2021-10-15 RX ADMIN — OXYCODONE AND ACETAMINOPHEN 2 TABLET(S): 5; 325 TABLET ORAL at 16:34

## 2021-10-15 RX ADMIN — GABAPENTIN 100 MILLIGRAM(S): 400 CAPSULE ORAL at 05:21

## 2021-10-15 RX ADMIN — OXYCODONE AND ACETAMINOPHEN 2 TABLET(S): 5; 325 TABLET ORAL at 10:40

## 2021-10-15 RX ADMIN — GABAPENTIN 100 MILLIGRAM(S): 400 CAPSULE ORAL at 21:33

## 2021-10-15 RX ADMIN — OXYCODONE AND ACETAMINOPHEN 2 TABLET(S): 5; 325 TABLET ORAL at 22:32

## 2021-10-15 RX ADMIN — GABAPENTIN 100 MILLIGRAM(S): 400 CAPSULE ORAL at 13:11

## 2021-10-15 RX ADMIN — PANTOPRAZOLE SODIUM 40 MILLIGRAM(S): 20 TABLET, DELAYED RELEASE ORAL at 05:21

## 2021-10-15 RX ADMIN — OXYCODONE AND ACETAMINOPHEN 2 TABLET(S): 5; 325 TABLET ORAL at 04:15

## 2021-10-15 RX ADMIN — OXYCODONE AND ACETAMINOPHEN 2 TABLET(S): 5; 325 TABLET ORAL at 03:45

## 2021-10-15 RX ADMIN — OXYCODONE AND ACETAMINOPHEN 2 TABLET(S): 5; 325 TABLET ORAL at 17:15

## 2021-10-15 RX ADMIN — AMLODIPINE BESYLATE 5 MILLIGRAM(S): 2.5 TABLET ORAL at 10:15

## 2021-10-15 RX ADMIN — Medication 650 MILLIGRAM(S): at 00:30

## 2021-10-15 RX ADMIN — OXYCODONE AND ACETAMINOPHEN 2 TABLET(S): 5; 325 TABLET ORAL at 09:56

## 2021-10-15 RX ADMIN — SODIUM CHLORIDE 100 MILLILITER(S): 9 INJECTION, SOLUTION INTRAVENOUS at 21:33

## 2021-10-15 RX ADMIN — OXYCODONE AND ACETAMINOPHEN 2 TABLET(S): 5; 325 TABLET ORAL at 23:10

## 2021-10-15 RX ADMIN — Medication 2 MILLIGRAM(S): at 11:25

## 2021-10-15 RX ADMIN — Medication 250 MILLIGRAM(S): at 09:59

## 2021-10-15 RX ADMIN — AMLODIPINE BESYLATE 5 MILLIGRAM(S): 2.5 TABLET ORAL at 09:55

## 2021-10-15 RX ADMIN — Medication 1 APPLICATION(S): at 11:26

## 2021-10-15 RX ADMIN — ALBUTEROL 2 PUFF(S): 90 AEROSOL, METERED ORAL at 21:46

## 2021-10-15 RX ADMIN — SODIUM CHLORIDE 100 MILLILITER(S): 9 INJECTION, SOLUTION INTRAVENOUS at 08:33

## 2021-10-15 RX ADMIN — Medication 200 MILLIGRAM(S): at 09:56

## 2021-10-15 RX ADMIN — CEFTRIAXONE 1000 MILLIGRAM(S): 500 INJECTION, POWDER, FOR SOLUTION INTRAMUSCULAR; INTRAVENOUS at 21:32

## 2021-10-15 RX ADMIN — Medication 2 MILLIGRAM(S): at 22:32

## 2021-10-15 RX ADMIN — Medication 250 MILLIGRAM(S): at 21:33

## 2021-10-15 RX ADMIN — ALBUTEROL 2 PUFF(S): 90 AEROSOL, METERED ORAL at 08:33

## 2021-10-15 RX ADMIN — Medication 100 MILLIGRAM(S): at 09:55

## 2021-10-15 RX ADMIN — Medication 100 MILLIGRAM(S): at 21:33

## 2021-10-15 RX ADMIN — HEPARIN SODIUM 5000 UNIT(S): 5000 INJECTION INTRAVENOUS; SUBCUTANEOUS at 21:33

## 2021-10-15 NOTE — PROGRESS NOTE ADULT - ASSESSMENT
39 female with anemia, HTN, RA, SLE was told to go to ED for evaluation of elevated creatinine from rheum office after long absence of followup   Cr 10- was 5.8 where baseline < 1.0.     NATALIA - Cr was slowly tredning down - plateaued    Multifactorial etio  -Pre-renal/NSAID related  vs other consideration is lupus nephritis with proteinuria and hematuria noted, urine tox + cocaine/heroin - drug use related ?     serologies noted   proteinuria 7 grams   Urine culture > 100K - on iV abx per ID   monitor renal trends  no acute indication for HD now as Cr trends down       UTI + enterobacter - hold renal bx and await ID clearance - will plan for renal bx early next week - discussed with Dr Hancock  blood cx staph epidermis - contaminant   check marilu  ,anca  continue IVF w NATALIA and diarrhea  Lupus serologies as above   Rheum consult  Hyperphoshatemia - Phos improved     Anemia  Managment per medicine  PRBC     * pt seen earlier today , note now

## 2021-10-15 NOTE — PROGRESS NOTE ADULT - SUBJECTIVE AND OBJECTIVE BOX
39 female with anemia, HTN, RA, SLE not on treatment and not actively following with her Rheum - was started on hydroxychloroquine and was told to go to ED for evaluation of elevated creatinine of 5.8 where baseline < 1.0 in January .   Has a history of IVDA as well.     Dr Del Rosario for most recent lab draw and sent in for the results. She reports going to this appt as she thought it odd that she had pink urine. Uses meloxicam 15 mg qd, has used aleve x 2 tabs but usually supplements w acetaminophen.         PAST MEDICAL & SURGICAL HISTORY:  Lupus    Rheumatoid arthritis    H/O Raynaud&#x27;s syndrome    Heroin abuse    Smoker    Rheumatoid arthritis    Sepsis    HTN (hypertension)    COPD (chronic obstructive pulmonary disease)    Depression    Epilepsy    GERD (gastroesophageal reflux disease)    Raynaud&#x27;s syndrome without gangrene    Bacteremia    Systemic lupus erythematosus    Aphonia    H/O tubal ligation    H/O appendicitis    Gall bladder stones    H/O tracheostomy    S/P percutaneous endoscopic gastrostomy (PEG) tube placement      MEDICATIONS  (STANDING):  ALBUTerol    90 MICROgram(s) HFA Inhaler 2 Puff(s) Inhalation every 6 hours  amLODIPine   Tablet 10 milliGRAM(s) Oral daily  cefTRIAXone Injectable. 1000 milliGRAM(s) IV Push every 24 hours  collagenase Ointment 1 Application(s) Topical daily  dextrose 5% 1000 milliLiter(s) (100 mL/Hr) IV Continuous <Continuous>  doxycycline hyclate Capsule 100 milliGRAM(s) Oral every 12 hours  gabapentin 100 milliGRAM(s) Oral three times a day  heparin   Injectable 5000 Unit(s) SubCutaneous every 12 hours  hydroxychloroquine 200 milliGRAM(s) Oral daily  influenza   Vaccine 0.5 milliLiter(s) IntraMuscular once  pantoprazole    Tablet 40 milliGRAM(s) Oral before breakfast  vancomycin  IVPB 750 milliGRAM(s) IV Intermittent every 12 hours        Allergies    morphine (Rash)  morphine (Unknown)    Intolerances          REVIEW OF SYSTEMS:    CONSTITUTIONAL: stable weakness, fevers or chills  EYES/ENT: No visual changes;  No vertigo or throat pain   NECK: No pain or stiffness  RESPIRATORY: No cough, wheezing, hemoptysis; No shortness of breath  CARDIOVASCULAR: No chest pain or palpitations  GASTROINTESTINAL: No abdominal or epigastric pain. No nausea, vomiting, or hematemesis; No diarrhea or constipation. No melena or hematochezia.  GENITOURINARY: No dysuria, frequency or hematuria  NEUROLOGICAL: No numbness or weakness  SKIN: No itching, burning, rashes, or lesions   All other review of systems is negative unless indicated above.      Vital Signs Last 24 Hrs  T(C): 37.4 (15 Oct 2021 20:40), Max: 37.4 (15 Oct 2021 20:40)  T(F): 99.4 (15 Oct 2021 20:40), Max: 99.4 (15 Oct 2021 20:40)  HR: 119 (15 Oct 2021 20:40) (76 - 119)  BP: 141/96 (15 Oct 2021 20:40) (126/80 - 173/104)  BP(mean): --  RR: 18 (15 Oct 2021 20:40) (17 - 18)  SpO2: 96% (15 Oct 2021 20:40) (96% - 100%)      PHYSICAL EXAM:    Constitutional: NAD, frail. >then stated age  HEENT: dry MM  Neck: No LAD, No JVD  Respiratory: dist  Cardiovascular: S1 and S2  Gastrointestinal: BS+, soft  Extremities: peripheral edema, le contorted  Neurological: A/O x 3        LABS:      137    |  99     |  83     ----------------------------<  98        15 Oct 2021 12:02  3.1     |  28     |  5.50     136    |  103    |  89     ----------------------------<  148       14 Oct 2021 08:39  3.6     |  20     |  5.50     139    |  107    |  99     ----------------------------<  101       13 Oct 2021 09:44  4.4     |  19     |  6.10     Ca    6.8        15 Oct 2021 12:02  Ca    6.8        14 Oct 2021 08:39    Phos  4.6       15 Oct 2021 12:02  Phos  5.3       14 Oct 2021 08:39    Mg     1.8       15 Oct 2021 12:02  Mg     1.2       14 Oct 2021 08:39    TPro  5.7    /  Alb  1.7    /  TBili  0.2    /        13 Oct 2021 09:44  DBili  x      /  AST  13     /  ALT  <6     /  AlkPhos  37       TPro  7.4    /  Alb  2.5    /  TBili  0.2    /        11 Oct 2021 22:25  DBili  x      /  AST  23     /  ALT  13     /  AlkPhos  60                                         8.9    5.53  )-----------( 214      ( 15 Oct 2021 12:02 )             27.1                         9.3    4.77  )-----------( 181      ( 14 Oct 2021 08:39 )             28.5     ds dna 137    c3 67   c4 11      Urine Studies:  Urinalysis Basic - ( 11 Oct 2021 22:25 )    Color: Yellow / Appearance: Slightly Turbid / S.015 / pH: x  Gluc: x / Ketone: Negative  / Bili: Negative / Urobili: Negative mg/dL   Blood: x / Protein: 500 mg/dL / Nitrite: Negative   Leuk Esterase: Moderate / RBC: >50 /HPF / WBC 26-50   Sq Epi: x / Non Sq Epi: Occasional / Bacteria: TNTC    urine protein 7 grams         RADIOLOGY & ADDITIONAL STUDIES:

## 2021-10-15 NOTE — CHART NOTE - NSCHARTNOTEFT_GEN_A_CORE
Alerted for positive blood cultures gram + cocci in clusters.   Patient has h/o IV heroin use  D/w attending Dr. Chiu  Started Vancomycin, ordered TTE, repeat blood cultures ordered   F/u ID consult and recommendations

## 2021-10-15 NOTE — PROGRESS NOTE ADULT - SUBJECTIVE AND OBJECTIVE BOX
40y old  Female who presents with a chief complaint of UTI/fatiuge  acute renal failure (12 Oct 2021 12:34)   Subjective; More energy today. Denies fever, chills, chest pain, SOB. Baseline diffuse body/joint pain improved. Decreased urinary frequency reported  Review of Systems: 14 Point review of systems reviewed and reported as negative unless otherwise stated in Subjective    "HPI:  40 year old female w hx anemia, HTN, RA, SLE on hydroxychloroquine was told to go to ED for evaluation of elevated creatinine  Cr 10- was 5.8 where baseline < 1.0  Has followed up w rheum q 3 months  mobility has changed given bone on bone w knees and shoulders and wrists; not able to use crutches or a walker so has been using a wheelchair  Uses meloxicam 15 mg qd  last week she used aleve x 2 tabs but usually supplements w acetaminophen  due to chronic pain she was to start gabapentin  she also just picked up her prescription for a new antihypertenive, lisinopril she has not started yet  In ED /109   HR 88    RR 18    T98.2  100% sat RA  Cr 6.72  given 30 cc/kg NS 1500 cc  abn UA w pyuria and microscopic hematuria  ceftriaxone"    T(C): 37.4 (10-15-21 @ 20:40), Max: 37.4 (10-15-21 @ 20:40)  HR: 119 (10-15-21 @ 20:40) (76 - 119)  BP: 141/96 (10-15-21 @ 20:40) (126/80 - 173/104)  RR: 18 (10-15-21 @ 20:40) (17 - 18)  SpO2: 96% (10-15-21 @ 20:40) (96% - 100%)    PAST MEDICAL HX  Aphonia hx  Bacteremia   COPD (chronic obstructive pulmonary disease)   Depression   Epilepsy   GERD (gastroesophageal reflux disease)    Heroin abuse history  HTN (hypertension)  Hx intubation w acute hypoxic resp failure, asp    Hx MRSA  Raynaud's syndrome without gangrene   RA rheumatoid arthritis   Sepsis   Smoker   SLE systemic lupus erythematosus.     PAST SURGICAL HX  Gall bladder stones   H/O appendicitis   H/O tracheostomy 2020  H/O tubal ligation   S/P percutaneous endoscopic gastrostomy (PEG) tube placement.2020     FAMILY HX  Heart Disease: Mother  Father: unknown  No Family Hx of: diagnosed with Diabetes, Cancer, Hypertension,   Stroke, Mental Illness    SOCIAL HX  former smoker  no alcohol  once a month marijuana (12 Oct 2021 02:15)          LABS:                           9.3    4.77  )-----------( 181      ( 14 Oct 2021 08:39 )             28.5     10-14    136  |  103  |  89<H>  ----------------------------<  148<H>  3.6   |  20<L>  |  5.50<H>    Ca    6.8<L>      14 Oct 2021 08:39  Phos  5.3     10-  Mg     1.2     10-14    TPro  5.7<L>  /  Alb  1.7<L>  /  TBili  0.2  /  DBili  x   /  AST  13<L>  /  ALT  <6<L>  /  AlkPhos  37<L>  10    COVID-19 PCR: NotDetec (11 Oct 2021 22:25)    CAPILLARY BLOOD GLUCOSE      Cocaine Metabolite, Urine: Positive (10.13.21 @ 11:30)   Cocaine Metabolite, Urine: Positive (20 @ 05:41) THC, Urine Qualitative: Positive (10.13.21 @ 11:30)   THC, Urine Qualitative: Negative (.20 @ 05:41) C4 Complement, Serum: 11 mg/dL (10.13.21 @ 09:44)   C4 Complement, Serum: 10 mg/dL (.26.20 @ 05:54) C3 Complement, Serum: 67 mg/dL (10.13.21 @ 09:44)   C3 Complement, Serum: 54 mg/dL (.26.20 @ 05:54) < from: MR Pelvis Bony Only No Cont (10.13.21 @ 14:58) >  IMPRESSION:  1.  Sacral decubitus ulcer, as described above.  2.  No MR evidence of acute osteomyelitis.  3.  Moderate to large amount of pelvic free fluid.    < end of copied text >  < from: MR Pelvis Bony Only No Cont (10.13.21 @ 14:58) >  FINDINGS:    Soft Tissues: A decubitus ulcer is identified along the rightward aspect of the sacrum overlying the S4-coccyx levels. The soft tissue wound measures approximately 39 x 72 mm. The wound extends to the level of the subcutaneous fat and approaches the posterior cortex of the sacrum/coccyx. There is no associated abscess or drainable fluid collection.    < end of copied text >      Culture - Urine (collected 11 Oct 2021 22:25)  Source: Clean Catch None  Preliminary Report (13 Oct 2021 21:39):    >100,000 CFU/ml Enterobacter cloacae complex                                  8.8    4.72  )-----------( 125      ( 13 Oct 2021 09:44 )             26.5     10-13    139  |  107  |  99<H>  ----------------------------<  101<H>  4.4   |  19<L>  |  6.10<H>    Ca    7.0<L>      13 Oct 2021 09:44  Phos  7.7     10-13    TPro  5.7<L>  /  Alb  1.7<L>  /  TBili  0.2  /  DBili  x   /  AST  13<L>  /  ALT  <6<L>  /  AlkPhos  37<L>  10-13    COVID-19 PCR: NotDetec (11 Oct 2021 22:25)    CAPILLARY BLOOD GLUCOSE          Culture - Urine (collected 11 Oct 2021 22:25)  Source: Clean Catch None  Preliminary Report (13 Oct 2021 21:39):    >100,000 CFU/ml Enterobacter cloacae complex                   6.6    4.22  )-----------( 82       ( 12 Oct 2021 09:54 )             20.0     10-12    134<L>  |  104  |  106<H>  Cocaine Metabolite, Urine: Positive (10.13.21 @ 11:30) THC, Urine Qualitative: Positive (10.13.21 @ 11:30) Protein/Creatinine Ratio, Urine (10.13.21 @ 11:30)   Protein/Creatinine Ratio Calculation: 7.5 Ratio   Total Protein, Random Urine: 241 mg/dL   Creatinine, Random Urine: 32:C-Reactive Protein, Serum: 55 mg/L (10.13.21 @ 09:45) Sedimentation Rate, Erythrocyte: 25 mm/hrC4 Complement, Serum: 11 mg/dL (10.13.21 @ 09:44) C3 Complement, Serum: 67 mg/dL (10.13.21 @ 09:44) ----------------------------<  108<H>  4.5   |  15<L>  |  6.51<H>    Ca    7.0<L>      12 Oct 2021 09:54  Phos  7.7     10-12    < from: MR Pelvis Bony Only No Cont (10.13.21 @ 14:58) >  IMPRESSION:  1.  Sacral decubitus ulcer, as described above.  2.  No MR evidence of acute osteomyelitis.  3.  Moderate to large amount of pelvic free fluid.    < end of copied text >  < from: CT Abdomen and Pelvis No Cont (10.12.21 @ 00:01) >  IMPRESSION: Limited evaluation without intravenous contrast.    4 mm right lower lobe nodule (2:7). New since previous exam. This is indeterminant.    Subtletree-in-bud type opacities in the right lower lobe almost completely resolved when compared to the previous exam from 2020.    No hydronephrosis. 2 mm nonobstructing left renal mid segment calcification.    Ill-defined left para-aortic soft tissue may represent lymphadenopathy. This is very poorly evaluated on this exam. Mildly enlarged bilateral pelvic and inguinal lymph nodes.. Further evaluation is needed contrast-enhanced examination is recommended.    < end of copied text >  < from: 12 Lead ECG (10.11.21 @ 23:11) >    Diagnosis Line Normal sinus rhythm  Normal ECG    < end of copied text >  TPro  7.4  /  Alb  2.5<L>  /  TBili  0.2  /  DBili  x   /  AST  23  /  ALT  13  /  AlkPhos  60  10-11    Urinalysis Basic - ( 11 Oct 2021 22:25 )  Color: Yellow / Appearance: Slightly Turbid / S.015 / pH: x  Gluc: x / Ketone: Negative  / Bili: Negative / Urobili: Negative mg/dL   Blood: x / Protein: 500 mg/dL / Nitrite: Negative   Leuk Esterase: Moderate / RBC: >50 /HPF / WBC 26-50   Sq Epi: x / Non Sq Epi: Occasional / Bacteria: TNTC    CARDIAC MARKERS ( 11 Oct 2021 22:25 )  x     / x     / 142 U/L / x     / x        RADIOLOGY & ADDITIONAL TESTS:  EXAM:  CT ABDOMEN AND PELVIS                        PROCEDURE DATE:  10/12/2021      INTERPRETATION:  CLINICAL INFORMATION: NATALIA with hematuria    COMPARISON: CT abdomen pelvis 2020. CT chest 2020    CONTRAST/COMPLICATIONS:  IV Contrast: NONE  Oral Contrast: NONE  Complications: None reported at time of study completion    PROCEDURE:  CT of the Abdomen and Pelvis was performed.  Sagittal and coronal reformats were performed.    FINDINGS:  LOWER CHEST: The cardiac septum is dense relative to the chambers consistent with anemia.  4 mm right lower lobe nodule (2:7). New since previous exam  Subtle tree-in-bud type opacities in the right lower lobe almost completely resolved when compared to the previous exam from 2020.    LIVER: Within normal limits.  BILE DUCTS: Normal caliber.  GALLBLADDER: Cholecystectomy.  SPLEEN: Within normal limits.  PANCREAS: Within normal limits.  ADRENALS: Within normal limits.  KIDNEYS/URETERS: No hydronephrosis. 3 mm nonobstructing left renal mid segment calcification.    BLADDER: Within normal limits.  REPRODUCTIVE ORGANS: Very poorly visualized.    BOWEL: No bowel obstruction. Appendix is not visualized. No evidence of inflammation in the pericecal region.  PERITONEUM: Small amount of pelvic ascites.  VESSELS: Within normal limits.  RETROPERITONEUM/LYMPH NODES: Ill-defined left para-aortic soft tissue may represent lymphadenopathy. This is very poorly evaluated on this exam. Mildly enlarged bilateral pelvic and inguinal lymph nodes.  ABDOMINAL WALL: Mild anasarca.  BONES: Degenerative changes of the lumbosacral spine.    IMPRESSION: Limited evaluation without intravenous contrast.    4 mm right lower lobe nodule (2:7). New since previous exam. This is indeterminant.    Subtletree-in-bud type opacities in the right lower lobe almost completely resolved when compared to the previous exam from 2020.    No hydronephrosis. 2 mm nonobstructing left renal mid segment calcification.    Ill-defined left para-aortic soft tissue may represent lymphadenopathy. This is very poorly evaluated on this exam. Mildly enlarged bilateral pelvic and inguinal lymph nodes.. Further evaluation is needed contrast-enhanced examination is recommended.      PHYSICAL EXAM:  T(C): 37.4 (10-15-21 @ 20:40), Max: 37.4 (10-15-21 @ 20:40)  HR: 119 (10-15-21 @ 20:40) (76 - 119)  BP: 141/96 (10-15-21 @ 20:40) (126/80 - 173/104)  RR: 18 (10-15-21 @ 20:40) (17 - 18)  SpO2: 96% (10-15-21 @ 20:40) (96% - 100%)  GENERAL: NAD, cachectic, pale  HEAD:  Atraumatic, Normocephalic  EYES: EOMI, PERRL, conjunctiva and sclera clear  HEENT: Dry mucous membranes,  NECK: Supple, No JVD  NERVOUS SYSTEM:  Alert & Oriented X3, Motor Strength 5/5 B/L upper and lower extremities; DTRs 2+ intact and symmetric  CHEST/LUNG: Clear to auscultation bilaterally; No rales, rhonchi, wheezing, or rubs  HEART: Regular rate and rhythm; No murmurs, rubs, or gallops  ABDOMEN: Soft, Nontender, Nondistended; Bowel sounds present  GENITOURINARY- Voiding, no palpable bladder  EXTREMITIES:  No edema.   MUSCULOSKELTAL- Multiple joint deformities noted. LT knee contracted and unable to extend. RT wrist in splint  SKIN- dressing removed from LT knee and noted lateral boil actively draining pus. dressing over the coccyx for decubitus. Multiple skin scars from prior skin abscesses.   CNS- alert, oriented X3, non focal     Daily Height in cm: 165.1 (11 Oct 2021 21:06)      MEDICATIONS  (STANDING):  cefTRIAXone Injectable. 1000 milliGRAM(s) IV Push every 24 hours  collagenase Ointment 1 Application(s) Topical daily  dextrose 5% 1000 milliLiter(s) (100 mL/Hr) IV Continuous <Continuous>  doxycycline hyclate Capsule 100 milliGRAM(s) Oral every 12 hours  gabapentin 100 milliGRAM(s) Oral three times a day  heparin   Injectable 5000 Unit(s) SubCutaneous every 8 hours  hydroxychloroquine 200 milliGRAM(s) Oral daily  influenza   Vaccine 0.5 milliLiter(s) IntraMuscular once    MEDICATIONS  (PRN):  acetaminophen   Tablet .. 650 milliGRAM(s) Oral every 6 hours PRN Temp greater or equal to 38C (100.4F), Mild Pain (1 - 3)  cloNIDine 0.1 milliGRAM(s) Oral every 8 hours PRN BP sys > 140 or diastolic > 100  melatonin 3 milliGRAM(s) Oral at bedtime PRN Insomnia  ondansetron Injectable 4 milliGRAM(s) IV Push every 8 hours PRN Nausea and/or Vomiting    Assessment/Plan  #Acute renal failure likely prerenal + NSAID's use  #Metabolic acidosis  #Proteinuria/Hematuria  Nephrology eval appreciated  IVF per renal/ bicarb  Monitor BMP  Avoid NSAID's  Possible SLE renal involvement? Kidny biopsy when cleared from Infectious standpoint for procedure     #Reported former Heroine abuse  -Patient denies any recent drug use despite positive UDS  -UDS positive for cocaine and marijuana    #UTI  Urine c/s  with >100K Enterobacter  Cont IV Rocephin for now till cultures are available  ID eval for further IV antibiotics    #LT knee draining boil. Stable  H/o MRSA before  Start PO Doxycycline  ID eval for further abx    #Rheumatoid arthritis/ possible SLE  Rheum eval DR [facility]  Cont Plaquenil for now  Chronic pain + Hx of drug abuse. At this time patient has objective reasons for pain and pain control with opioids warranted but will avoid rapid uptitration to avoid recurrence/signficant dependency.     #Anemia likely chronic  Will transfuse 2 Units PRBC today  Monitor HH    #Thrombocytopenia- likely chronic  Monitor counts    #HTN- cont current meds    #DVT proph- heparin SQ    #Dispo- MRI negative for osteo. Mild improvement in Cr. Nephrology recs. Continue IVF. Abx for UTI as per ID. Plan for renal biopsy probably early next week.

## 2021-10-16 LAB
ALBUMIN SERPL ELPH-MCNC: 2 G/DL — LOW (ref 3.3–5)
ALP SERPL-CCNC: 142 U/L — HIGH (ref 40–120)
ALT FLD-CCNC: 29 U/L — SIGNIFICANT CHANGE UP (ref 12–78)
ANION GAP SERPL CALC-SCNC: 10 MMOL/L — SIGNIFICANT CHANGE UP (ref 5–17)
AST SERPL-CCNC: 103 U/L — HIGH (ref 15–37)
BASOPHILS # BLD AUTO: 0.02 K/UL — SIGNIFICANT CHANGE UP (ref 0–0.2)
BASOPHILS NFR BLD AUTO: 0.4 % — SIGNIFICANT CHANGE UP (ref 0–2)
BILIRUB DIRECT SERPL-MCNC: <0.1 MG/DL — SIGNIFICANT CHANGE UP (ref 0–0.2)
BILIRUB INDIRECT FLD-MCNC: >0.2 MG/DL — SIGNIFICANT CHANGE UP (ref 0.2–1)
BILIRUB SERPL-MCNC: 0.3 MG/DL — SIGNIFICANT CHANGE UP (ref 0.2–1.2)
BUN SERPL-MCNC: 76 MG/DL — HIGH (ref 7–23)
CALCIUM SERPL-MCNC: 7.6 MG/DL — LOW (ref 8.5–10.1)
CCP IGG SERPL-ACNC: <8 UNITS — SIGNIFICANT CHANGE UP
CHLORIDE SERPL-SCNC: 97 MMOL/L — SIGNIFICANT CHANGE UP (ref 96–108)
CO2 SERPL-SCNC: 31 MMOL/L — SIGNIFICANT CHANGE UP (ref 22–31)
CREAT SERPL-MCNC: 5.86 MG/DL — HIGH (ref 0.5–1.3)
DSDNA AB SER-ACNC: 196 IU/ML — HIGH
EOSINOPHIL # BLD AUTO: 0.25 K/UL — SIGNIFICANT CHANGE UP (ref 0–0.5)
EOSINOPHIL NFR BLD AUTO: 4.6 % — SIGNIFICANT CHANGE UP (ref 0–6)
GLUCOSE SERPL-MCNC: 91 MG/DL — SIGNIFICANT CHANGE UP (ref 70–99)
HCT VFR BLD CALC: 27.4 % — LOW (ref 34.5–45)
HGB BLD-MCNC: 8.7 G/DL — LOW (ref 11.5–15.5)
IMM GRANULOCYTES NFR BLD AUTO: 0.6 % — SIGNIFICANT CHANGE UP (ref 0–1.5)
LYMPHOCYTES # BLD AUTO: 0.83 K/UL — LOW (ref 1–3.3)
LYMPHOCYTES # BLD AUTO: 15.4 % — SIGNIFICANT CHANGE UP (ref 13–44)
MCHC RBC-ENTMCNC: 27.4 PG — SIGNIFICANT CHANGE UP (ref 27–34)
MCHC RBC-ENTMCNC: 31.8 GM/DL — LOW (ref 32–36)
MCV RBC AUTO: 86.2 FL — SIGNIFICANT CHANGE UP (ref 80–100)
MONOCYTES # BLD AUTO: 0.36 K/UL — SIGNIFICANT CHANGE UP (ref 0–0.9)
MONOCYTES NFR BLD AUTO: 6.7 % — SIGNIFICANT CHANGE UP (ref 2–14)
NEUTROPHILS # BLD AUTO: 3.89 K/UL — SIGNIFICANT CHANGE UP (ref 1.8–7.4)
NEUTROPHILS NFR BLD AUTO: 72.3 % — SIGNIFICANT CHANGE UP (ref 43–77)
PLATELET # BLD AUTO: 226 K/UL — SIGNIFICANT CHANGE UP (ref 150–400)
POTASSIUM SERPL-MCNC: 3.1 MMOL/L — LOW (ref 3.5–5.3)
POTASSIUM SERPL-SCNC: 3.1 MMOL/L — LOW (ref 3.5–5.3)
PROT SERPL-MCNC: 6.4 GM/DL — SIGNIFICANT CHANGE UP (ref 6–8.3)
RBC # BLD: 3.18 M/UL — LOW (ref 3.8–5.2)
RBC # FLD: 14.6 % — HIGH (ref 10.3–14.5)
RF+CCP IGG SER-IMP: NEGATIVE — SIGNIFICANT CHANGE UP
SODIUM SERPL-SCNC: 138 MMOL/L — SIGNIFICANT CHANGE UP (ref 135–145)
WBC # BLD: 5.38 K/UL — SIGNIFICANT CHANGE UP (ref 3.8–10.5)
WBC # FLD AUTO: 5.38 K/UL — SIGNIFICANT CHANGE UP (ref 3.8–10.5)

## 2021-10-16 PROCEDURE — 71045 X-RAY EXAM CHEST 1 VIEW: CPT | Mod: 26

## 2021-10-16 PROCEDURE — 99232 SBSQ HOSP IP/OBS MODERATE 35: CPT

## 2021-10-16 RX ORDER — POTASSIUM CHLORIDE 20 MEQ
20 PACKET (EA) ORAL
Refills: 0 | Status: COMPLETED | OUTPATIENT
Start: 2021-10-16 | End: 2021-10-16

## 2021-10-16 RX ORDER — AMLODIPINE BESYLATE 2.5 MG/1
5 TABLET ORAL ONCE
Refills: 0 | Status: COMPLETED | OUTPATIENT
Start: 2021-10-16 | End: 2021-10-16

## 2021-10-16 RX ORDER — DIPHENHYDRAMINE HCL 50 MG
25 CAPSULE ORAL ONCE
Refills: 0 | Status: COMPLETED | OUTPATIENT
Start: 2021-10-16 | End: 2021-10-16

## 2021-10-16 RX ORDER — SODIUM CHLORIDE 9 MG/ML
1000 INJECTION INTRAMUSCULAR; INTRAVENOUS; SUBCUTANEOUS
Refills: 0 | Status: DISCONTINUED | OUTPATIENT
Start: 2021-10-16 | End: 2021-10-19

## 2021-10-16 RX ORDER — POTASSIUM CHLORIDE 20 MEQ
40 PACKET (EA) ORAL EVERY 4 HOURS
Refills: 0 | Status: DISCONTINUED | OUTPATIENT
Start: 2021-10-16 | End: 2021-10-16

## 2021-10-16 RX ADMIN — Medication 250 MILLIGRAM(S): at 10:56

## 2021-10-16 RX ADMIN — Medication 25 MILLIGRAM(S): at 17:17

## 2021-10-16 RX ADMIN — Medication 200 MILLIGRAM(S): at 10:55

## 2021-10-16 RX ADMIN — AMLODIPINE BESYLATE 5 MILLIGRAM(S): 2.5 TABLET ORAL at 06:36

## 2021-10-16 RX ADMIN — Medication 100 MILLIGRAM(S): at 22:12

## 2021-10-16 RX ADMIN — Medication 100 MILLIGRAM(S): at 10:56

## 2021-10-16 RX ADMIN — OXYCODONE AND ACETAMINOPHEN 2 TABLET(S): 5; 325 TABLET ORAL at 17:23

## 2021-10-16 RX ADMIN — Medication 2 MILLIGRAM(S): at 17:23

## 2021-10-16 RX ADMIN — Medication 2 MILLIGRAM(S): at 05:31

## 2021-10-16 RX ADMIN — SODIUM CHLORIDE 50 MILLILITER(S): 9 INJECTION INTRAMUSCULAR; INTRAVENOUS; SUBCUTANEOUS at 14:39

## 2021-10-16 RX ADMIN — CEFTRIAXONE 1000 MILLIGRAM(S): 500 INJECTION, POWDER, FOR SOLUTION INTRAMUSCULAR; INTRAVENOUS at 22:12

## 2021-10-16 RX ADMIN — Medication 20 MILLIEQUIVALENT(S): at 18:52

## 2021-10-16 RX ADMIN — Medication 1 APPLICATION(S): at 14:39

## 2021-10-16 RX ADMIN — GABAPENTIN 100 MILLIGRAM(S): 400 CAPSULE ORAL at 14:35

## 2021-10-16 RX ADMIN — GABAPENTIN 100 MILLIGRAM(S): 400 CAPSULE ORAL at 05:31

## 2021-10-16 RX ADMIN — Medication 3 MILLIGRAM(S): at 22:12

## 2021-10-16 RX ADMIN — Medication 250 MILLIGRAM(S): at 22:12

## 2021-10-16 RX ADMIN — OXYCODONE AND ACETAMINOPHEN 2 TABLET(S): 5; 325 TABLET ORAL at 05:10

## 2021-10-16 RX ADMIN — ALBUTEROL 2 PUFF(S): 90 AEROSOL, METERED ORAL at 08:38

## 2021-10-16 RX ADMIN — HEPARIN SODIUM 5000 UNIT(S): 5000 INJECTION INTRAVENOUS; SUBCUTANEOUS at 22:12

## 2021-10-16 RX ADMIN — SODIUM CHLORIDE 100 MILLILITER(S): 9 INJECTION, SOLUTION INTRAVENOUS at 06:38

## 2021-10-16 RX ADMIN — Medication 20 MILLIEQUIVALENT(S): at 16:00

## 2021-10-16 RX ADMIN — AMLODIPINE BESYLATE 10 MILLIGRAM(S): 2.5 TABLET ORAL at 10:56

## 2021-10-16 RX ADMIN — PANTOPRAZOLE SODIUM 40 MILLIGRAM(S): 20 TABLET, DELAYED RELEASE ORAL at 05:31

## 2021-10-16 RX ADMIN — HEPARIN SODIUM 5000 UNIT(S): 5000 INJECTION INTRAVENOUS; SUBCUTANEOUS at 10:55

## 2021-10-16 RX ADMIN — OXYCODONE AND ACETAMINOPHEN 2 TABLET(S): 5; 325 TABLET ORAL at 11:19

## 2021-10-16 RX ADMIN — GABAPENTIN 100 MILLIGRAM(S): 400 CAPSULE ORAL at 22:12

## 2021-10-16 RX ADMIN — OXYCODONE AND ACETAMINOPHEN 2 TABLET(S): 5; 325 TABLET ORAL at 04:36

## 2021-10-16 RX ADMIN — ALBUTEROL 2 PUFF(S): 90 AEROSOL, METERED ORAL at 14:32

## 2021-10-16 NOTE — PROGRESS NOTE ADULT - SUBJECTIVE AND OBJECTIVE BOX
39 female with anemia, HTN, RA, SLE not on treatment and not actively following with her Rheum - was started on hydroxychloroquine and was told to go to ED for evaluation of elevated creatinine of 5.8 where baseline < 1.0 in January .   Has a history of IVDA as well.     Dr Del Rosario for most recent lab draw and sent in for the results. She reports going to this appt as she thought it odd that she had pink urine. Uses meloxicam 15 mg qd, has used aleve x 2 tabs but usually supplements w acetaminophen.     today    less diarrhea    tolerating po    feels like she is retaining water , feels like her face is full    no sob     PAST MEDICAL & SURGICAL HISTORY:  Lupus    Rheumatoid arthritis    H/O Raynaud&#x27;s syndrome    Heroin abuse    Smoker    Rheumatoid arthritis    Sepsis    HTN (hypertension)    COPD (chronic obstructive pulmonary disease)    Depression    Epilepsy    GERD (gastroesophageal reflux disease)    Raynaud&#x27;s syndrome without gangrene    Bacteremia    Systemic lupus erythematosus    Aphonia    H/O tubal ligation    H/O appendicitis    Gall bladder stones    H/O tracheostomy    S/P percutaneous endoscopic gastrostomy (PEG) tube placement      MEDICATIONS  (STANDING):  ALBUTerol    90 MICROgram(s) HFA Inhaler 2 Puff(s) Inhalation every 6 hours  amLODIPine   Tablet 10 milliGRAM(s) Oral daily  cefTRIAXone Injectable. 1000 milliGRAM(s) IV Push every 24 hours  collagenase Ointment 1 Application(s) Topical daily  doxycycline hyclate Capsule 100 milliGRAM(s) Oral every 12 hours  gabapentin 100 milliGRAM(s) Oral three times a day  heparin   Injectable 5000 Unit(s) SubCutaneous every 12 hours  hydroxychloroquine 200 milliGRAM(s) Oral daily  influenza   Vaccine 0.5 milliLiter(s) IntraMuscular once  pantoprazole    Tablet 40 milliGRAM(s) Oral before breakfast  potassium chloride    Tablet ER 20 milliEquivalent(s) Oral every 2 hours  sodium chloride 0.9%. 1000 milliLiter(s) (50 mL/Hr) IV Continuous <Continuous>  vancomycin  IVPB 750 milliGRAM(s) IV Intermittent every 12 hours        Allergies    morphine (Rash)  morphine (Unknown)    Intolerances          REVIEW OF SYSTEMS:    CONSTITUTIONAL: stable weakness, fevers or chills  EYES/ENT: No visual changes;  No vertigo or throat pain   NECK: No pain or stiffness  RESPIRATORY: No cough, wheezing, hemoptysis; No shortness of breath  CARDIOVASCULAR: No chest pain or palpitations  GASTROINTESTINAL: No abdominal or epigastric pain. No nausea, vomiting, or hematemesis; No diarrhea or constipation. No melena or hematochezia.  GENITOURINARY: No dysuria, frequency or hematuria  NEUROLOGICAL: No numbness or weakness  SKIN: No itching, burning, rashes, or lesions   All other review of systems is negative unless indicated above.      Vital Signs Last 24 Hrs  T(C): 37.1 (16 Oct 2021 15:42), Max: 37.4 (15 Oct 2021 20:40)  T(F): 98.8 (16 Oct 2021 15:42), Max: 99.4 (15 Oct 2021 20:40)  HR: 114 (16 Oct 2021 16:52) (97 - 119)  BP: 138/95 (16 Oct 2021 16:52) (132/86 - 160/102)  BP(mean): --  RR: 18 (16 Oct 2021 16:52) (17 - 18)  SpO2: 91% (16 Oct 2021 16:52) (91% - 100%)    I&O's Detail    15 Oct 2021 07:01  -  16 Oct 2021 07:00  --------------------------------------------------------  IN:    dextrose 5% w/ Additives: 1180 mL    IV PiggyBack: 250 mL  Total IN: 1430 mL    OUT:  Total OUT: 0 mL    Total NET: 1430 mL        PHYSICAL EXAM:    Constitutional: NAD, frail. >then stated age  HEENT: dry MM  Neck: No LAD, No JVD  Respiratory: dist  Cardiovascular: S1 and S2  Gastrointestinal: BS+, soft  Extremities: peripheral edema, le contorted  Neurological: A/O x 3        LABS:    138    |  97     |  76     ----------------------------<  91        16 Oct 2021 08:33  3.1     |  31     |  5.86     137    |  99     |  83     ----------------------------<  98        15 Oct 2021 12:02  3.1     |  28     |  5.50     136    |  103    |  89     ----------------------------<  148       14 Oct 2021 08:39  3.6     |  20     |  5.50     Ca    7.6        16 Oct 2021 08:33  Ca    6.8        15 Oct 2021 12:02    Phos  4.6       15 Oct 2021 12:02  Phos  5.3       14 Oct 2021 08:39    Mg     1.8       15 Oct 2021 12:02  Mg     1.2       14 Oct 2021 08:39                            8.7    5.38  )-----------( 226      ( 16 Oct 2021 08:33 )             27.4                         8.9    5.53  )-----------( 214      ( 15 Oct 2021 12:02 )             27.1                  ds dna 137    c3 67   c4 11      Urine Studies:  Urinalysis Basic - ( 11 Oct 2021 22:25 )    Color: Yellow / Appearance: Slightly Turbid / S.015 / pH: x  Gluc: x / Ketone: Negative  / Bili: Negative / Urobili: Negative mg/dL   Blood: x / Protein: 500 mg/dL / Nitrite: Negative   Leuk Esterase: Moderate / RBC: >50 /HPF / WBC 26-50   Sq Epi: x / Non Sq Epi: Occasional / Bacteria: TNTC    urine protein 7 grams         RADIOLOGY & ADDITIONAL STUDIES:

## 2021-10-16 NOTE — PROGRESS NOTE ADULT - SUBJECTIVE AND OBJECTIVE BOX
40y old  Female who presents with a chief complaint of UTI/fatiuge  acute renal failure (12 Oct 2021 12:34)   Subjective; More energy today. Denies fever, chills, chest pain, SOB. Baseline diffuse body/joint pain improved. Decreased urinary frequency reported  Review of Systems: 14 Point review of systems reviewed and reported as negative unless otherwise stated in Subjective    "HPI:  40 year old female w hx anemia, HTN, RA, SLE on hydroxychloroquine was told to go to ED for evaluation of elevated creatinine  Cr 10- was 5.8 where baseline < 1.0  Has followed up w rheum q 3 months  mobility has changed given bone on bone w knees and shoulders and wrists; not able to use crutches or a walker so has been using a wheelchair  Uses meloxicam 15 mg qd  last week she used aleve x 2 tabs but usually supplements w acetaminophen  due to chronic pain she was to start gabapentin  she also just picked up her prescription for a new antihypertenive, lisinopril she has not started yet  In ED /109   HR 88    RR 18    T98.2  100% sat RA  Cr 6.72  given 30 cc/kg NS 1500 cc  abn UA w pyuria and microscopic hematuria  ceftriaxone"  T(C): 36.9 (10-16-21 @ 08:02), Max: 37.4 (10-15-21 @ 20:40)  HR: 97 (10-16-21 @ 14:33) (97 - 119)  BP: 137/94 (10-16-21 @ 08:02) (126/80 - 160/102)  RR: 17 (10-16-21 @ 08:02) (17 - 18)  SpO2: 98% (10-16-21 @ 14:33) (96% - 100%)    PAST MEDICAL HX  Aphonia hx  Bacteremia   COPD (chronic obstructive pulmonary disease)   Depression   Epilepsy   GERD (gastroesophageal reflux disease)    Heroin abuse history  HTN (hypertension)  Hx intubation w acute hypoxic resp failure, asp    Hx MRSA  Raynaud's syndrome without gangrene   RA rheumatoid arthritis   Sepsis   Smoker   SLE systemic lupus erythematosus.     PAST SURGICAL HX  Gall bladder stones   H/O appendicitis   H/O tracheostomy 2020  H/O tubal ligation   S/P percutaneous endoscopic gastrostomy (PEG) tube placement.2020     FAMILY HX  Heart Disease: Mother  Father: unknown  No Family Hx of: diagnosed with Diabetes, Cancer, Hypertension,   Stroke, Mental Illness    SOCIAL HX  former smoker  no alcohol  once a month marijuana (12 Oct 2021 02:15)          LABS:                           8.7    5.38  )-----------( 226      ( 16 Oct 2021 08:33 )             27.4     10-16    138  |  97  |  76<H>  ----------------------------<  91  3.1<L>   |  31  |  5.86<H>    Ca    7.6<L>      16 Oct 2021 08:33  Phos  4.6     10-15  Mg     1.8     10-15    TPro  6.4  /  Alb  2.0<L>  /  TBili  0.3  /  DBili  <0.1  /  AST  103<H>  /  ALT  29  /  AlkPhos  142<H>  10-16    COVID-19 PCR: NotDetec (11 Oct 2021 22:25)    CAPILLARY BLOOD GLUCOSE          GI PCR Panel, Stool (collected 14 Oct 2021 12:30)  Source: .Stool Feces  Final Report (14 Oct 2021 18:18):    GI PCR Results: NOT detected    *******Please Note:*******    GI panel PCR evaluates for:    Campylobacter, Plesiomonas shigelloides, Salmonella,    Vibrio, Yersinia enterocolitica, Enteroaggregative    Escherichia coli (EAEC), Enteropathogenic E.coli (EPEC),    Enterotoxigenic E. coli (ETEC) lt/st, Shiga-like    toxin-producing E. coli (STEC) stx1/stx2,    Shigella/ Enteroinvasive E. coli (EIEC), Cryptosporidium,    Cyclospora cayetanensis, Entamoeba histolytica,    Giardia lamblia, Adenovirus F 40/41, Astrovirus,    Norovirus GI/GII, Rotavirus A, Sapovirus                            9.3    4.77  )-----------( 181      ( 14 Oct 2021 08:39 )             28.5     10-14    136  |  103  |  89<H>  ----------------------------<  148<H>  3.6   |  20<L>  |  5.50<H>    Ca    6.8<L>      14 Oct 2021 08:39  Phos  5.3     10-  Mg     1.2     10-    TPro  5.7<L>  /  Alb  1.7<L>  /  TBili  0.2  /  DBili  x   /  AST  13<L>  /  ALT  <6<L>  /  AlkPhos  37<L>  10    COVID-19 PCR: NotDetec (11 Oct 2021 22:25)    CAPILLARY BLOOD GLUCOSE      Cocaine Metabolite, Urine: Positive (10.13.21 @ 11:30)   Cocaine Metabolite, Urine: Positive (20 @ 05:41) THC, Urine Qualitative: Positive (10.13.21 @ 11:30)   THC, Urine Qualitative: Negative (20 @ 05:41) C4 Complement, Serum: 11 mg/dL (10.13.21 @ 09:44)   C4 Complement, Serum: 10 mg/dL (20 @ 05:54) C3 Complement, Serum: 67 mg/dL (10.13.21 @ 09:44)   C3 Complement, Serum: 54 mg/dL (20 @ 05:54) < from: MR Pelvis Bony Only No Cont (10 @ 14:58) >  IMPRESSION:  1.  Sacral decubitus ulcer, as described above.  2.  No MR evidence of acute osteomyelitis.  3.  Moderate to large amount of pelvic free fluid.    < end of copied text >  < from: MR Pelvis Bony Only No Cont (10 @ 14:58) >  FINDINGS:    Soft Tissues: A decubitus ulcer is identified along the rightward aspect of the sacrum overlying the S4-coccyx levels. The soft tissue wound measures approximately 39 x 72 mm. The wound extends to the level of the subcutaneous fat and approaches the posterior cortex of the sacrum/coccyx. There is no associated abscess or drainable fluid collection.    < end of copied text >      Culture - Urine (collected 11 Oct 2021 22:25)  Source: Clean Catch None  Preliminary Report (13 Oct 2021 21:39):    >100,000 CFU/ml Enterobacter cloacae complex                                  8.8    4.72  )-----------( 125      ( 13 Oct 2021 09:44 )             26.5     10-13    139  |  107  |  99<H>  ----------------------------<  101<H>  4.4   |  19<L>  |  6.10<H>    Ca    7.0<L>      13 Oct 2021 09:44  Phos  7.7     10-13    TPro  5.7<L>  /  Alb  1.7<L>  /  TBili  0.2  /  DBili  x   /  AST  13<L>  /  ALT  <6<L>  /  AlkPhos  37<L>  10    COVID-19 PCR: NotDetec (11 Oct 2021 22:25)    CAPILLARY BLOOD GLUCOSE          Culture - Urine (collected 11 Oct 2021 22:25)  Source: Clean Catch None  Preliminary Report (13 Oct 2021 21:39):    >100,000 CFU/ml Enterobacter cloacae complex                   6.6    4.22  )-----------( 82       ( 12 Oct 2021 09:54 )             20.0     10-12    134<L>  |  104  |  106<H>  Cocaine Metabolite, Urine: Positive (10.13.21 @ 11:30) THC, Urine Qualitative: Positive (10.13.21 @ 11:30) Protein/Creatinine Ratio, Urine (10.13.21 @ 11:30)   Protein/Creatinine Ratio Calculation: 7.5 Ratio   Total Protein, Random Urine: 241 mg/dL   Creatinine, Random Urine: 32:C-Reactive Protein, Serum: 55 mg/L (10.13. @ 09:45) Sedimentation Rate, Erythrocyte: 25 mm/hrC4 Complement, Serum: 11 mg/dL (10.13.21 @ 09:44) C3 Complement, Serum: 67 mg/dL (10.13.21 @ 09:44) ----------------------------<  108<H>  4.5   |  15<L>  |  6.51<H>    Ca    7.0<L>      12 Oct 2021 09:54  Phos  7.7     10-12    < from: MR Pelvis Bony Only No Cont (10.13.21 @ 14:58) >  IMPRESSION:  1.  Sacral decubitus ulcer, as described above.  2.  No MR evidence of acute osteomyelitis.  3.  Moderate to large amount of pelvic free fluid.    < end of copied text >  < from: CT Abdomen and Pelvis No Cont (10.12.21 @ 00:01) >  IMPRESSION: Limited evaluation without intravenous contrast.    4 mm right lower lobe nodule (2:7). New since previous exam. This is indeterminant.    Subtletree-in-bud type opacities in the right lower lobe almost completely resolved when compared to the previous exam from 2020.    No hydronephrosis. 2 mm nonobstructing left renal mid segment calcification.    Ill-defined left para-aortic soft tissue may represent lymphadenopathy. This is very poorly evaluated on this exam. Mildly enlarged bilateral pelvic and inguinal lymph nodes.. Further evaluation is needed contrast-enhanced examination is recommended.    < end of copied text >  < from: 12 Lead ECG (10.11.21 @ 23:11) >    Diagnosis Line Normal sinus rhythm  Normal ECG    < end of copied text >  TPro  7.4  /  Alb  2.5<L>  /  TBili  0.2  /  DBili  x   /  AST  23  /  ALT  13  /  AlkPhos  60  10-11    Urinalysis Basic - ( 11 Oct 2021 22:25 )  Color: Yellow / Appearance: Slightly Turbid / S.015 / pH: x  Gluc: x / Ketone: Negative  / Bili: Negative / Urobili: Negative mg/dL   Blood: x / Protein: 500 mg/dL / Nitrite: Negative   Leuk Esterase: Moderate / RBC: >50 /HPF / WBC 26-50   Sq Epi: x / Non Sq Epi: Occasional / Bacteria: TNTC    CARDIAC MARKERS ( 11 Oct 2021 22:25 )  x     / x     / 142 U/L / x     / x        RADIOLOGY & ADDITIONAL TESTS:  EXAM:  CT ABDOMEN AND PELVIS                        PROCEDURE DATE:  10/12/2021      INTERPRETATION:  CLINICAL INFORMATION: NATALIA with hematuria    COMPARISON: CT abdomen pelvis 2020. CT chest 2020    CONTRAST/COMPLICATIONS:  IV Contrast: NONE  Oral Contrast: NONE  Complications: None reported at time of study completion    PROCEDURE:  CT of the Abdomen and Pelvis was performed.  Sagittal and coronal reformats were performed.    FINDINGS:  LOWER CHEST: The cardiac septum is dense relative to the chambers consistent with anemia.  4 mm right lower lobe nodule (2:7). New since previous exam  Subtle tree-in-bud type opacities in the right lower lobe almost completely resolved when compared to the previous exam from 2020.    LIVER: Within normal limits.  BILE DUCTS: Normal caliber.  GALLBLADDER: Cholecystectomy.  SPLEEN: Within normal limits.  PANCREAS: Within normal limits.  ADRENALS: Within normal limits.  KIDNEYS/URETERS: No hydronephrosis. 3 mm nonobstructing left renal mid segment calcification.    BLADDER: Within normal limits.  REPRODUCTIVE ORGANS: Very poorly visualized.    BOWEL: No bowel obstruction. Appendix is not visualized. No evidence of inflammation in the pericecal region.  PERITONEUM: Small amount of pelvic ascites.  VESSELS: Within normal limits.  RETROPERITONEUM/LYMPH NODES: Ill-defined left para-aortic soft tissue may represent lymphadenopathy. This is very poorly evaluated on this exam. Mildly enlarged bilateral pelvic and inguinal lymph nodes.  ABDOMINAL WALL: Mild anasarca.  BONES: Degenerative changes of the lumbosacral spine.    IMPRESSION: Limited evaluation without intravenous contrast.    4 mm right lower lobe nodule (2:7). New since previous exam. This is indeterminant.    Subtletree-in-bud type opacities in the right lower lobe almost completely resolved when compared to the previous exam from 2020.    No hydronephrosis. 2 mm nonobstructing left renal mid segment calcification.    Ill-defined left para-aortic soft tissue may represent lymphadenopathy. This is very poorly evaluated on this exam. Mildly enlarged bilateral pelvic and inguinal lymph nodes.. Further evaluation is needed contrast-enhanced examination is recommended.      PHYSICAL EXAM:  T(C): 37.4 (10-15-21 @ 20:40), Max: 37.4 (10-15-21 @ 20:40)  HR: 119 (10-15- @ 20:40) (76 - 119)  BP: 141/96 (10-15-21 @ 20:40) (126/80 - 173/104)  RR: 18 (10-15-21 @ 20:40) (17 - 18)  SpO2: 96% (10-15-21 @ 20:40) (96% - 100%)  GENERAL: NAD, cachectic, pale  HEAD:  Atraumatic, Normocephalic  EYES: EOMI, PERRL, conjunctiva and sclera clear  HEENT: Dry mucous membranes,  NECK: Supple, No JVD  NERVOUS SYSTEM:  Alert & Oriented X3, Motor Strength 5/5 B/L upper and lower extremities; DTRs 2+ intact and symmetric  CHEST/LUNG: Clear to auscultation bilaterally; No rales, rhonchi, wheezing, or rubs  HEART: Regular rate and rhythm; No murmurs, rubs, or gallops  ABDOMEN: Soft, Nontender, Nondistended; Bowel sounds present  GENITOURINARY- Voiding, no palpable bladder  EXTREMITIES:  No edema.   MUSCULOSKELTAL- Multiple joint deformities noted. LT knee contracted and unable to extend. RT wrist in splint  SKIN- dressing removed from LT knee and noted lateral boil actively draining pus. dressing over the coccyx for decubitus. Multiple skin scars from prior skin abscesses.   CNS- alert, oriented X3, non focal     Daily Height in cm: 165.1 (11 Oct 2021 21:06)      MEDICATIONS  (STANDING):  cefTRIAXone Injectable. 1000 milliGRAM(s) IV Push every 24 hours  collagenase Ointment 1 Application(s) Topical daily  dextrose 5% 1000 milliLiter(s) (100 mL/Hr) IV Continuous <Continuous>  doxycycline hyclate Capsule 100 milliGRAM(s) Oral every 12 hours  gabapentin 100 milliGRAM(s) Oral three times a day  heparin   Injectable 5000 Unit(s) SubCutaneous every 8 hours  hydroxychloroquine 200 milliGRAM(s) Oral daily  influenza   Vaccine 0.5 milliLiter(s) IntraMuscular once    MEDICATIONS  (PRN):  acetaminophen   Tablet .. 650 milliGRAM(s) Oral every 6 hours PRN Temp greater or equal to 38C (100.4F), Mild Pain (1 - 3)  cloNIDine 0.1 milliGRAM(s) Oral every 8 hours PRN BP sys > 140 or diastolic > 100  melatonin 3 milliGRAM(s) Oral at bedtime PRN Insomnia  ondansetron Injectable 4 milliGRAM(s) IV Push every 8 hours PRN Nausea and/or Vomiting    Assessment/Plan  #Acute renal failure likely Intrarenal + NSAID's use  #Metabolic acidosis  #Proteinuria/Hematuria  Nephrology eval appreciated  IVF per renal/ bicarb  Monitor BMP  Avoid NSAID's  Possible SLE renal involvement? Kidny biopsy when cleared from Infectious standpoint for procedure. Tentatively monday     #Reported former Heroine abuse  -Patient denies any recent drug use despite positive UDS  -UDS positive for cocaine and marijuana  -Giving some oral opioids as patient has objective reason for pain but will avoi any significant uptitration.     #UTI  Urine c/s  with >100K Enterobacter  Cont IV Rocephin for now till cultures are available  ID eval for further IV antibiotics    #LT knee draining boil. Stable  H/o MRSA before  Start PO Doxycycline  ID eval for further abx    #Rheumatoid arthritis/ possible SLE  Rheum eval DR Quintana group  Cont Plaquenil for now  Chronic pain + Hx of drug abuse. At this time patient has objective reasons for pain and pain control with opioids warranted but will avoid rapid uptitration to avoid recurrence/signficant dependency.     #Anemia likely chronic  Will transfuse 2 Units PRBC today  Monitor HH    #Thrombocytopenia- likely chronic  Monitor counts    #HTN- cont current meds    #DVT proph- heparin SQ    #Dispo- MRI negative for osteo. Mild improvement in Cr. Nephrology recs. Continue IVF. Abx for UTI as per ID. Plan for renal biopsy probably early next week.

## 2021-10-16 NOTE — PROGRESS NOTE ADULT - ASSESSMENT
39 female with anemia, HTN, RA, SLE was told to go to ED for evaluation of elevated creatinine from rheum office after long absence of followup   Cr 10- was 5.8 where baseline < 1.0.     NATALIA - Cr has not improved despite IVF - Cr now plateaued    /NSAID related  vs lupus nephritis with proteinuria and hematuria note vs tox screen + cocaine/heroin - drug use related ?     serologies noted   proteinuria 7 grams   Urine culture > 100K - on iV abx per ID  - d/w Dr Santi maldonado to proceed with renal bx monday   bcx are negative   monitor renal trends  no acute indication for HD at this time   check marilu  ,anca  hypokalemia - replete today     Metab acidosis resolved    change to NS and lower rate as diarrhea improved    SLE   Rheum consult    Anemia   Managment per medicine   PRBC if needed     * pt seen earlier today , note now    39 female with anemia, HTN, RA, SLE was told to go to ED for evaluation of elevated creatinine from rheum office after long absence of followup   Cr 10- was 5.8 where baseline < 1.0.     NATALIA - Cr has not improved despite IVF - Cr now plateaued    /NSAID related  vs lupus nephritis with proteinuria and hematuria note vs tox screen + cocaine/heroin - drug use related ?     serologies noted   proteinuria 7 grams   Urine culture > 100K - on iV abx per ID  - d/w Dr Hancock ok to proceed with renal bx monday   inital bx + MRSE ( epidermis ) --> repeat bcx are negative   monitor renal trends  no acute indication for HD at this time   check marilu  ,anca  hypokalemia - replete today     Metab acidosis resolved    change to NS and lower rate as diarrhea improved    SLE   Rheum consult    Anemia   Managment per medicine   PRBC if needed     * pt seen earlier today , note now

## 2021-10-17 LAB
ADD ON TEST-SPECIMEN IN LAB: SIGNIFICANT CHANGE UP
ALBUMIN SERPL ELPH-MCNC: 1.9 G/DL — LOW (ref 3.3–5)
ALP SERPL-CCNC: 110 U/L — SIGNIFICANT CHANGE UP (ref 40–120)
ALT FLD-CCNC: 22 U/L — SIGNIFICANT CHANGE UP (ref 12–78)
ANA PAT FLD IF-IMP: ABNORMAL
ANA TITR SER: ABNORMAL
ANION GAP SERPL CALC-SCNC: 8 MMOL/L — SIGNIFICANT CHANGE UP (ref 5–17)
APTT BLD: 31.1 SEC — SIGNIFICANT CHANGE UP (ref 27.5–35.5)
AST SERPL-CCNC: 33 U/L — SIGNIFICANT CHANGE UP (ref 15–37)
BILIRUB DIRECT SERPL-MCNC: <0.1 MG/DL — SIGNIFICANT CHANGE UP (ref 0–0.2)
BILIRUB INDIRECT FLD-MCNC: >0.2 MG/DL — SIGNIFICANT CHANGE UP (ref 0.2–1)
BILIRUB SERPL-MCNC: 0.3 MG/DL — SIGNIFICANT CHANGE UP (ref 0.2–1.2)
BUN SERPL-MCNC: 77 MG/DL — HIGH (ref 7–23)
CALCIUM SERPL-MCNC: 7.9 MG/DL — LOW (ref 8.5–10.1)
CHLORIDE SERPL-SCNC: 102 MMOL/L — SIGNIFICANT CHANGE UP (ref 96–108)
CO2 SERPL-SCNC: 29 MMOL/L — SIGNIFICANT CHANGE UP (ref 22–31)
CREAT SERPL-MCNC: 6.06 MG/DL — HIGH (ref 0.5–1.3)
GLUCOSE SERPL-MCNC: 84 MG/DL — SIGNIFICANT CHANGE UP (ref 70–99)
HCT VFR BLD CALC: 26.5 % — LOW (ref 34.5–45)
HGB BLD-MCNC: 8.4 G/DL — LOW (ref 11.5–15.5)
INR BLD: 1.09 RATIO — SIGNIFICANT CHANGE UP (ref 0.88–1.16)
MCHC RBC-ENTMCNC: 27.5 PG — SIGNIFICANT CHANGE UP (ref 27–34)
MCHC RBC-ENTMCNC: 31.7 GM/DL — LOW (ref 32–36)
MCV RBC AUTO: 86.6 FL — SIGNIFICANT CHANGE UP (ref 80–100)
PLATELET # BLD AUTO: 257 K/UL — SIGNIFICANT CHANGE UP (ref 150–400)
POTASSIUM SERPL-MCNC: 4.5 MMOL/L — SIGNIFICANT CHANGE UP (ref 3.5–5.3)
POTASSIUM SERPL-SCNC: 4.5 MMOL/L — SIGNIFICANT CHANGE UP (ref 3.5–5.3)
PROT SERPL-MCNC: 6.3 GM/DL — SIGNIFICANT CHANGE UP (ref 6–8.3)
PROTHROM AB SERPL-ACNC: 12.7 SEC — SIGNIFICANT CHANGE UP (ref 10.6–13.6)
RBC # BLD: 3.06 M/UL — LOW (ref 3.8–5.2)
RBC # FLD: 14.6 % — HIGH (ref 10.3–14.5)
SODIUM SERPL-SCNC: 139 MMOL/L — SIGNIFICANT CHANGE UP (ref 135–145)
VANCOMYCIN TROUGH SERPL-MCNC: 38.2 UG/ML — CRITICAL HIGH (ref 10–20)
WBC # BLD: 5.36 K/UL — SIGNIFICANT CHANGE UP (ref 3.8–10.5)
WBC # FLD AUTO: 5.36 K/UL — SIGNIFICANT CHANGE UP (ref 3.8–10.5)

## 2021-10-17 PROCEDURE — 99233 SBSQ HOSP IP/OBS HIGH 50: CPT

## 2021-10-17 RX ADMIN — GABAPENTIN 100 MILLIGRAM(S): 400 CAPSULE ORAL at 22:25

## 2021-10-17 RX ADMIN — Medication 100 MILLIGRAM(S): at 10:33

## 2021-10-17 RX ADMIN — Medication 2 MILLIGRAM(S): at 10:33

## 2021-10-17 RX ADMIN — Medication 3 MILLIGRAM(S): at 22:25

## 2021-10-17 RX ADMIN — ALBUTEROL 2 PUFF(S): 90 AEROSOL, METERED ORAL at 08:35

## 2021-10-17 RX ADMIN — OXYCODONE AND ACETAMINOPHEN 2 TABLET(S): 5; 325 TABLET ORAL at 18:34

## 2021-10-17 RX ADMIN — Medication 650 MILLIGRAM(S): at 14:59

## 2021-10-17 RX ADMIN — OXYCODONE AND ACETAMINOPHEN 2 TABLET(S): 5; 325 TABLET ORAL at 12:26

## 2021-10-17 RX ADMIN — Medication 200 MILLIGRAM(S): at 10:33

## 2021-10-17 RX ADMIN — CEFTRIAXONE 1000 MILLIGRAM(S): 500 INJECTION, POWDER, FOR SOLUTION INTRAMUSCULAR; INTRAVENOUS at 22:26

## 2021-10-17 RX ADMIN — AMLODIPINE BESYLATE 10 MILLIGRAM(S): 2.5 TABLET ORAL at 10:33

## 2021-10-17 RX ADMIN — PANTOPRAZOLE SODIUM 40 MILLIGRAM(S): 20 TABLET, DELAYED RELEASE ORAL at 10:34

## 2021-10-17 RX ADMIN — HEPARIN SODIUM 5000 UNIT(S): 5000 INJECTION INTRAVENOUS; SUBCUTANEOUS at 22:26

## 2021-10-17 RX ADMIN — Medication 100 MILLIGRAM(S): at 22:26

## 2021-10-17 RX ADMIN — Medication 1 APPLICATION(S): at 16:30

## 2021-10-17 RX ADMIN — ALBUTEROL 2 PUFF(S): 90 AEROSOL, METERED ORAL at 13:17

## 2021-10-17 RX ADMIN — HEPARIN SODIUM 5000 UNIT(S): 5000 INJECTION INTRAVENOUS; SUBCUTANEOUS at 10:34

## 2021-10-17 RX ADMIN — SODIUM CHLORIDE 50 MILLILITER(S): 9 INJECTION INTRAMUSCULAR; INTRAVENOUS; SUBCUTANEOUS at 10:34

## 2021-10-17 RX ADMIN — GABAPENTIN 100 MILLIGRAM(S): 400 CAPSULE ORAL at 13:03

## 2021-10-17 RX ADMIN — ALBUTEROL 2 PUFF(S): 90 AEROSOL, METERED ORAL at 21:21

## 2021-10-17 RX ADMIN — OXYCODONE AND ACETAMINOPHEN 2 TABLET(S): 5; 325 TABLET ORAL at 06:24

## 2021-10-17 RX ADMIN — OXYCODONE AND ACETAMINOPHEN 2 TABLET(S): 5; 325 TABLET ORAL at 00:19

## 2021-10-17 NOTE — PROGRESS NOTE ADULT - ASSESSMENT
39 female with anemia, HTN, RA, SLE was told to go to ED for evaluation of elevated creatinine from rheum office after long absence of followup   Cr 10- was 5.8 where baseline < 1.0.         NATALIA  vs NATALIA on CKD  Cr had improved slightly but now rising   differential - multifactorial etio   NSAID use +/-  lupus nephritis +/- cocaine/heroin  now + elevated  Vanco level  38    - d/w Dr Driscoll and Dr Harrington - leny dc'd this am before dose given     serologies noted   active urine sediment noted +  proteinuria 7 grams   UTI treated  d/w Dr Hancock ok to proceed with renal bx Monday   inital bx + MRSE ( epidermis ) --> repeat bcx are negative     Plan for renal bx Monday by IR - pt agreeable - indication to narrow down etio and possible treatment  no acute indication for HD at this time but if Cr continues to rise may need consider soon    Metab acidosis resolved   low rate IV NS w diarrhea ongoing    SLE   Rheum followup    * pt seen earlier today , note now

## 2021-10-17 NOTE — CHART NOTE - NSCHARTNOTEFT_GEN_A_CORE
Received call from RN re:  pt Reina Mane, found to have drug paraphilia (suspected heroin) in items brought by visitor       Pt seen and examined at bedside, in NAD and no complaints, states that she has not taken anything more than benzos prior to admission and has not taken illicit drugs since admission. patient tachycardiac.     T(C): 37.2 (10-17-21 @ 20:37), Max: 37.2 (10-17-21 @ 15:57)  HR: 114 (10-17-21 @ 20:37) (103 - 114)  BP: 147/96 (10-17-21 @ 20:37) (126/83 - 150/99)  RR: 18 (10-17-21 @ 20:37) (18 - 18)  SpO2: 100% (10-17-21 @ 20:37) (94% - 100%)      Physical exam  Constitutional: NAD, awake and alert, well-developed  Respiratory: Breath sounds are clear bilaterally, No wheezing, rales or rhonchi  Cardiovascular: S1 and S2, regular rate and rhythm, no Murmurs, gallops or rubs      Assessment  41yo F with hx of Drug use currently admitted for acute renal failure, found to have illicit drugs (heroin) in her possession in setting of tachycardia, otherwise hemodynamically stable and with no acute complaints    Plan  -EKG ordered - showed Sinus tachycardia with rate of   -Discussed case w/RN who is aware and will contact MD w/further concerns should they arise  -Urine toxicology deferred at this time given patient have been receiving percocet during this admission Received call from RN re:  pt Reina Mane, found to have drug paraphilia (suspected heroin) in items brought by visitor     Pt seen and examined at bedside, in NAD with no acute complaints. Patient denies, chest pain, SOB, or palpitations, States that she has not taken anything more than benzos prior to admission and has not taken illicit drugs since admission. Collection bag noted to have Lighter, drugs- powdered substance, needle syringes, remnants of a powdered substance, broken gabapentin capsule, and confiscated home meds revealed empty gabapentin bottle.     T(C): 37.2 (10-17-21 @ 20:37), Max: 37.2 (10-17-21 @ 15:57)  HR: 114 (10-17-21 @ 20:37) (103 - 114)  BP: 147/96 (10-17-21 @ 20:37) (126/83 - 150/99)  RR: 18 (10-17-21 @ 20:37) (18 - 18)  SpO2: 100% (10-17-21 @ 20:37) (94% - 100%)    Physical exam  Constitutional: NAD, awake and alert, well-developed  Respiratory: Breath sounds are clear bilaterally, No wheezing, rales or rhonchi  Cardiovascular: S1 and S2, regular rate and rhythm, no Murmurs, gallops or rubs      Assessment  39yo F with hx of Drug use currently admitted for acute renal failure, found to have illicit drugs (heroin) in her possession in setting of tachycardia, otherwise hemodynamically stable and with no acute complaints    Plan  -EKG ordered - showed Sinus tachycardia with rate of 111BPM   -Discussed case w/RN who is aware and will contact MD w/further concerns should they arise  -Urine toxicology ordered

## 2021-10-17 NOTE — PROGRESS NOTE ADULT - SUBJECTIVE AND OBJECTIVE BOX
40y old  Female who presents with a chief complaint of UTI/fatiuge  acute renal failure (12 Oct 2021 12:34)   Subjective; More energy today. Denies fever, chills, chest pain, SOB. Baseline diffuse body/joint pain improved. Decreased urinary frequency reported  s/p  fall/going to ground on 10/17 in that patient was walking back from bathroom with walker. Attempted to move something on floor and slipped and used walker as brace to go to the ground. No head trauma. Landed on right sided buttons. No bleeding or skin break other than other pressure ulcer that is at her baseline. No weakness or loss of sensation in limbs. minimal pain that was already present. Continue to monitor.   Review of Systems: 14 Point review of systems reviewed and reported as negative unless otherwise stated in Subjective    "HPI:  40 year old female w hx anemia, HTN, RA, SLE on hydroxychloroquine was told to go to ED for evaluation of elevated creatinine  Cr 10- was 5.8 where baseline < 1.0  Has followed up w rheum q 3 months  mobility has changed given bone on bone w knees and shoulders and wrists; not able to use crutches or a walker so has been using a wheelchair  Uses meloxicam 15 mg qd  last week she used aleve x 2 tabs but usually supplements w acetaminophen  due to chronic pain she was to start gabapentin  she also just picked up her prescription for a new antihypertenive, lisinopril she has not started yet  In ED /109   HR 88    RR 18    T98.2  100% sat RA  Cr 6.72  given 30 cc/kg NS 1500 cc  abn UA w pyuria and microscopic hematuria  ceftriaxone"      PAST MEDICAL HX  Aphonia hx  Bacteremia   COPD (chronic obstructive pulmonary disease)   Depression   Epilepsy   GERD (gastroesophageal reflux disease)    Heroin abuse history  HTN (hypertension)  Hx intubation w acute hypoxic resp failure, asp    Hx MRSA  Raynaud's syndrome without gangrene   RA rheumatoid arthritis   Sepsis   Smoker   SLE systemic lupus erythematosus.     PAST SURGICAL HX  Gall bladder stones   H/O appendicitis   H/O tracheostomy 2020  H/O tubal ligation   S/P percutaneous endoscopic gastrostomy (PEG) tube placement.2020     FAMILY HX  Heart Disease: Mother  Father: unknown  No Family Hx of: diagnosed with Diabetes, Cancer, Hypertension,   Stroke, Mental Illness    SOCIAL HX  former smoker  no alcohol  once a month marijuana (12 Oct 2021 02:15)    PHYSICAL EXAM:  T(C): 36.9 (10-17-21 @ 11:46), Max: 36.9 (10-17-21 @ 11:46)  HR: 105 (10-17-21 @ 13:19) (103 - 114)  BP: 150/99 (10-17-21 @ 11:46) (126/83 - 150/99)  RR: 18 (10-17-21 @ 11:46) (18 - 18)  SpO2: 97% (10-17-21 @ 13:19) (91% - 97%)  GENERAL: NAD, cachectic, pale  HEAD:  Atraumatic, Normocephalic  EYES: EOMI, PERRL, conjunctiva and sclera clear  HEENT: Dry mucous membranes,  NECK: Supple, No JVD  NERVOUS SYSTEM:  Alert & Oriented X3, Motor Strength 5/5 B/L upper and lower extremities; DTRs 2+ intact and symmetric  CHEST/LUNG: Clear to auscultation bilaterally; No rales, rhonchi, wheezing, or rubs  HEART: Regular rate and rhythm; No murmurs, rubs, or gallops  ABDOMEN: Soft, Nontender, Nondistended; Bowel sounds present  GENITOURINARY- Voiding, no palpable bladder  EXTREMITIES:  No edema.   MUSCULOSKELTAL- Multiple joint deformities noted. LT knee contracted and unable to extend. RT wrist in splint  SKIN- dressing removed from LT knee and noted lateral boil actively draining pus. dressing over the coccyx for decubitus. Multiple skin scars from prior skin abscesses.   CNS- alert, oriented X3, non focal                         8.4    5.36  )-----------( 257      ( 17 Oct 2021 08:44 )             26.5     10-17    139  |  102  |  77<H>  ----------------------------<  84  4.5   |  29  |  6.06<H>    Ca    7.9<L>      17 Oct 2021 08:44    TPro  6.3  /  Alb  1.9<L>  /  TBili  0.3  /  DBili  <0.1  /  AST  33  /  ALT  22  /  AlkPhos  110  10-17    COVID-19 PCR: NotDetec (11 Oct 2021 22:25)    CAPILLARY BLOOD GLUCOSE          Culture - Blood (collected 15 Oct 2021 12:02)  Source: .Blood None  Preliminary Report (16 Oct 2021 17:01):    No growth to date.    Culture - Blood (collected 15 Oct 2021 12:02)  Source: .Blood None  Preliminary Report (16 Oct 2021 17:01):    No growth to date.        LABS:                           8.7    5.38  )-----------( 226      ( 16 Oct 2021 08:33 )             27.4     10-16    138  |  97  |  76<H>  ----------------------------<  91  3.1<L>   |  31  |  5.86<H>    Ca    7.6<L>      16 Oct 2021 08:33  Phos  4.6     10-15  Mg     1.8     10-15    TPro  6.4  /  Alb  2.0<L>  /  TBili  0.3  /  DBili  <0.1  /  AST  103<H>  /  ALT  29  /  AlkPhos  142<H>  10-16    COVID-19 PCR: NotDetec (11 Oct 2021 22:25)    CAPILLARY BLOOD GLUCOSE          GI PCR Panel, Stool (collected 14 Oct 2021 12:30)  Source: .Stool Feces  Final Report (14 Oct 2021 18:18):    GI PCR Results: NOT detected    *******Please Note:*******    GI panel PCR evaluates for:    Campylobacter, Plesiomonas shigelloides, Salmonella,    Vibrio, Yersinia enterocolitica, Enteroaggregative    Escherichia coli (EAEC), Enteropathogenic E.coli (EPEC),    Enterotoxigenic E. coli (ETEC) lt/st, Shiga-like    toxin-producing E. coli (STEC) stx1/stx2,    Shigella/ Enteroinvasive E. coli (EIEC), Cryptosporidium,    Cyclospora cayetanensis, Entamoeba histolytica,    Giardia lamblia, Adenovirus F 40/41, Astrovirus,    Norovirus GI/GII, Rotavirus A, Sapovirus                            9.3    4.77  )-----------( 181      ( 14 Oct 2021 08:39 )             28.5     10-14    136  |  103  |  89<H>  ----------------------------<  148<H>  3.6   |  20<L>  |  5.50<H>    Ca    6.8<L>      14 Oct 2021 08:39  Phos  5.3     10  Mg     1.2     10-14    TPro  5.7<L>  /  Alb  1.7<L>  /  TBili  0.2  /  DBili  x   /  AST  13<L>  /  ALT  <6<L>  /  AlkPhos  37<L>  10    COVID-19 PCR: NotDetec (11 Oct 2021 22:25)    CAPILLARY BLOOD GLUCOSE      Cocaine Metabolite, Urine: Positive (10.13.21 @ 11:30)   Cocaine Metabolite, Urine: Positive (20 @ 05:41) THC, Urine Qualitative: Positive (10.13.21 @ 11:30)   THC, Urine Qualitative: Negative (20 @ 05:41) C4 Complement, Serum: 11 mg/dL (10.13.21 @ 09:44)   C4 Complement, Serum: 10 mg/dL (.20 @ 05:54) C3 Complement, Serum: 67 mg/dL (10. @ 09:44)   C3 Complement, Serum: 54 mg/dL (..20 @ 05:54) < from: MR Pelvis Bony Only No Cont (10.13.21 @ 14:58) >  IMPRESSION:  1.  Sacral decubitus ulcer, as described above.  2.  No MR evidence of acute osteomyelitis.  3.  Moderate to large amount of pelvic free fluid.    < end of copied text >  < from: MR Pelvis Bony Only No Cont (10.13.21 @ 14:58) >  FINDINGS:    Soft Tissues: A decubitus ulcer is identified along the rightward aspect of the sacrum overlying the S4-coccyx levels. The soft tissue wound measures approximately 39 x 72 mm. The wound extends to the level of the subcutaneous fat and approaches the posterior cortex of the sacrum/coccyx. There is no associated abscess or drainable fluid collection.    < end of copied text >      Culture - Urine (collected 11 Oct 2021 22:25)  Source: Clean Catch None  Preliminary Report (13 Oct 2021 21:39):    >100,000 CFU/ml Enterobacter cloacae complex                                  8.8    4.72  )-----------( 125      ( 13 Oct 2021 09:44 )             26.5     10-13    139  |  107  |  99<H>  ----------------------------<  101<H>  4.4   |  19<L>  |  6.10<H>    Ca    7.0<L>      13 Oct 2021 09:44  Phos  7.7     10-13    TPro  5.7<L>  /  Alb  1.7<L>  /  TBili  0.2  /  DBili  x   /  AST  13<L>  /  ALT  <6<L>  /  AlkPhos  37<L>  10-13    COVID-19 PCR: NotDetec (11 Oct 2021 22:25)    CAPILLARY BLOOD GLUCOSE          Culture - Urine (collected 11 Oct 2021 22:25)  Source: Clean Catch None  Preliminary Report (13 Oct 2021 21:39):    >100,000 CFU/ml Enterobacter cloacae complex                   6.6    4.22  )-----------( 82       ( 12 Oct 2021 09:54 )             20.0     10-12    134<L>  |  104  |  106<H>  Cocaine Metabolite, Urine: Positive (10.13.21 @ 11:30) THC, Urine Qualitative: Positive (10.13.21 @ 11:30) Protein/Creatinine Ratio, Urine (10.13.21 @ 11:30)   Protein/Creatinine Ratio Calculation: 7.5 Ratio   Total Protein, Random Urine: 241 mg/dL   Creatinine, Random Urine: 32:C-Reactive Protein, Serum: 55 mg/L (10.13.21 @ 09:45) Sedimentation Rate, Erythrocyte: 25 mm/hrC4 Complement, Serum: 11 mg/dL (10.13.21 @ 09:44) C3 Complement, Serum: 67 mg/dL (10.13.21 @ 09:44) ----------------------------<  108<H>  4.5   |  15<L>  |  6.51<H>    Ca    7.0<L>      12 Oct 2021 09:54  Phos  7.7     10-12    < from: MR Pelvis Bony Only No Cont (10.13.21 @ 14:58) >  IMPRESSION:  1.  Sacral decubitus ulcer, as described above.  2.  No MR evidence of acute osteomyelitis.  3.  Moderate to large amount of pelvic free fluid.    < end of copied text >  < from: CT Abdomen and Pelvis No Cont (10.12.21 @ 00:01) >  IMPRESSION: Limited evaluation without intravenous contrast.    4 mm right lower lobe nodule (2:7). New since previous exam. This is indeterminant.    Subtletree-in-bud type opacities in the right lower lobe almost completely resolved when compared to the previous exam from 2020.    No hydronephrosis. 2 mm nonobstructing left renal mid segment calcification.    Ill-defined left para-aortic soft tissue may represent lymphadenopathy. This is very poorly evaluated on this exam. Mildly enlarged bilateral pelvic and inguinal lymph nodes.. Further evaluation is needed contrast-enhanced examination is recommended.    < end of copied text >  < from: 12 Lead ECG (10.11.21 @ 23:11) >    Diagnosis Line Normal sinus rhythm  Normal ECG    < end of copied text >  TPro  7.4  /  Alb  2.5<L>  /  TBili  0.2  /  DBili  x   /  AST  23  /  ALT  13  /  AlkPhos  60  10-11    Urinalysis Basic - ( 11 Oct 2021 22:25 )  Color: Yellow / Appearance: Slightly Turbid / S.015 / pH: x  Gluc: x / Ketone: Negative  / Bili: Negative / Urobili: Negative mg/dL   Blood: x / Protein: 500 mg/dL / Nitrite: Negative   Leuk Esterase: Moderate / RBC: >50 /HPF / WBC 26-50   Sq Epi: x / Non Sq Epi: Occasional / Bacteria: TNTC    CARDIAC MARKERS ( 11 Oct 2021 22:25 )  x     / x     / 142 U/L / x     / x        RADIOLOGY & ADDITIONAL TESTS:  EXAM:  CT ABDOMEN AND PELVIS                        PROCEDURE DATE:  10/12/2021      INTERPRETATION:  CLINICAL INFORMATION: NATALIA with hematuria    COMPARISON: CT abdomen pelvis 2020. CT chest 2020    CONTRAST/COMPLICATIONS:  IV Contrast: NONE  Oral Contrast: NONE  Complications: None reported at time of study completion    PROCEDURE:  CT of the Abdomen and Pelvis was performed.  Sagittal and coronal reformats were performed.    FINDINGS:  LOWER CHEST: The cardiac septum is dense relative to the chambers consistent with anemia.  4 mm right lower lobe nodule (2:7). New since previous exam  Subtle tree-in-bud type opacities in the right lower lobe almost completely resolved when compared to the previous exam from 2020.    LIVER: Within normal limits.  BILE DUCTS: Normal caliber.  GALLBLADDER: Cholecystectomy.  SPLEEN: Within normal limits.  PANCREAS: Within normal limits.  ADRENALS: Within normal limits.  KIDNEYS/URETERS: No hydronephrosis. 3 mm nonobstructing left renal mid segment calcification.    BLADDER: Within normal limits.  REPRODUCTIVE ORGANS: Very poorly visualized.    BOWEL: No bowel obstruction. Appendix is not visualized. No evidence of inflammation in the pericecal region.  PERITONEUM: Small amount of pelvic ascites.  VESSELS: Within normal limits.  RETROPERITONEUM/LYMPH NODES: Ill-defined left para-aortic soft tissue may represent lymphadenopathy. This is very poorly evaluated on this exam. Mildly enlarged bilateral pelvic and inguinal lymph nodes.  ABDOMINAL WALL: Mild anasarca.  BONES: Degenerative changes of the lumbosacral spine.    IMPRESSION: Limited evaluation without intravenous contrast.    4 mm right lower lobe nodule (2:7). New since previous exam. This is indeterminant.    Subtletree-in-bud type opacities in the right lower lobe almost completely resolved when compared to the previous exam from 2020.    No hydronephrosis. 2 mm nonobstructing left renal mid segment calcification.    Ill-defined left para-aortic soft tissue may represent lymphadenopathy. This is very poorly evaluated on this exam. Mildly enlarged bilateral pelvic and inguinal lymph nodes.. Further evaluation is needed contrast-enhanced examination is recommended.          Daily Height in cm: 165.1 (11 Oct 2021 21:06)      MEDICATIONS  (STANDING):  cefTRIAXone Injectable. 1000 milliGRAM(s) IV Push every 24 hours  collagenase Ointment 1 Application(s) Topical daily  dextrose 5% 1000 milliLiter(s) (100 mL/Hr) IV Continuous <Continuous>  doxycycline hyclate Capsule 100 milliGRAM(s) Oral every 12 hours  gabapentin 100 milliGRAM(s) Oral three times a day  heparin   Injectable 5000 Unit(s) SubCutaneous every 8 hours  hydroxychloroquine 200 milliGRAM(s) Oral daily  influenza   Vaccine 0.5 milliLiter(s) IntraMuscular once    MEDICATIONS  (PRN):  acetaminophen   Tablet .. 650 milliGRAM(s) Oral every 6 hours PRN Temp greater or equal to 38C (100.4F), Mild Pain (1 - 3)  cloNIDine 0.1 milliGRAM(s) Oral every 8 hours PRN BP sys > 140 or diastolic > 100  melatonin 3 milliGRAM(s) Oral at bedtime PRN Insomnia  ondansetron Injectable 4 milliGRAM(s) IV Push every 8 hours PRN Nausea and/or Vomiting    Assessment/Plan  #Acute renal failure likely Intrarenal with glomerular nephritis and renal involvement of SLE/Autoimmune Disorder + NSAID's use + ATN due to Vancomycin induced toxicity (Vancomycin toxicity not present on admission)  #Metabolic acidosis  #Marked Proteinuria/Hematuria  #Rheumatoid arthritis/ SLE  Nephrology eval appreciated  IVF per renal/ bicarb  Monitor BMP  Avoid NSAID's  Possible SLE renal involvement? Kidney biopsy when cleared from Infectious standpoint for procedure. Tentatively Monday   Blood cultures positive on 10/15 and Vancomycin ordered. Vanco trough 10/17 38.2. Discontinue Vancomycin  Continue IVF  Associated anasarca from low protein/hypoalbuminemia from marked proteinuria and IVF administration. Continue to monitor  Markers that are positive MAE positive 1:1280 Speckled pattern, AntiSSA >8.0, Anti SSB 2.6, dsDNA Ab 196.   Markers that are negative CCP Ab  ANCA pending  Rheum eval DR Quintana group  Cont Plaquenil for now  Chronic pain + Hx of drug abuse. At this time patient has objective reasons for pain and pain control with opioids warranted but will avoid rapid uptitration to avoid recurrence/signficant dependency.     #Reported former Heroine abuse  -Patient denies any recent drug use despite positive UDS  -UDS positive for cocaine and marijuana  -Giving some oral opioids as patient has objective reason for pain but will avoid any significant up titration unless warranted.     #UTI  Urine c/s  with >100K Enterobacter  Cont IV Rocephin for now till cultures are available  ID eval for further IV antibiotics    #LT knee draining boil. Stable  H/o MRSA before  PO Doxycycline    #Anemia likely chronic due to chronic disease.   s/p 2 units of pRBCs  No overt signs of bleediing  Monitor HH    #Thrombocytopenia- likely chronic  Monitor counts    #HTN- cont current meds    #DVT proph- heparin SQ    #Dispo- MRI negative for osteo. Mild improvement in Cr. Nephrology/ID/Rhematology recs. Continue IVF. . Renal biopsy tentatively 10/18.

## 2021-10-18 ENCOUNTER — RESULT REVIEW (OUTPATIENT)
Age: 41
End: 2021-10-18

## 2021-10-18 LAB
ANION GAP SERPL CALC-SCNC: 9 MMOL/L — SIGNIFICANT CHANGE UP (ref 5–17)
BUN SERPL-MCNC: 77 MG/DL — HIGH (ref 7–23)
CALCIUM SERPL-MCNC: 8.1 MG/DL — LOW (ref 8.5–10.1)
CHLORIDE SERPL-SCNC: 105 MMOL/L — SIGNIFICANT CHANGE UP (ref 96–108)
CO2 SERPL-SCNC: 24 MMOL/L — SIGNIFICANT CHANGE UP (ref 22–31)
CREAT SERPL-MCNC: 6.18 MG/DL — HIGH (ref 0.5–1.3)
CULTURE RESULTS: SIGNIFICANT CHANGE UP
GLUCOSE SERPL-MCNC: 87 MG/DL — SIGNIFICANT CHANGE UP (ref 70–99)
PCP SPEC-MCNC: SIGNIFICANT CHANGE UP
POTASSIUM SERPL-MCNC: 4.8 MMOL/L — SIGNIFICANT CHANGE UP (ref 3.5–5.3)
POTASSIUM SERPL-SCNC: 4.8 MMOL/L — SIGNIFICANT CHANGE UP (ref 3.5–5.3)
SARS-COV-2 RNA SPEC QL NAA+PROBE: SIGNIFICANT CHANGE UP
SODIUM SERPL-SCNC: 138 MMOL/L — SIGNIFICANT CHANGE UP (ref 135–145)
SPECIMEN SOURCE: SIGNIFICANT CHANGE UP

## 2021-10-18 PROCEDURE — 77012 CT SCAN FOR NEEDLE BIOPSY: CPT | Mod: 26

## 2021-10-18 PROCEDURE — 93010 ELECTROCARDIOGRAM REPORT: CPT

## 2021-10-18 PROCEDURE — 99232 SBSQ HOSP IP/OBS MODERATE 35: CPT

## 2021-10-18 PROCEDURE — 99233 SBSQ HOSP IP/OBS HIGH 50: CPT

## 2021-10-18 PROCEDURE — 50200 RENAL BIOPSY PERQ: CPT | Mod: RT

## 2021-10-18 RX ORDER — SODIUM CHLORIDE 9 MG/ML
1000 INJECTION INTRAMUSCULAR; INTRAVENOUS; SUBCUTANEOUS
Refills: 0 | Status: DISCONTINUED | OUTPATIENT
Start: 2021-10-18 | End: 2021-10-18

## 2021-10-18 RX ORDER — FENTANYL CITRATE 50 UG/ML
50 INJECTION INTRAVENOUS
Refills: 0 | Status: DISCONTINUED | OUTPATIENT
Start: 2021-10-18 | End: 2021-10-18

## 2021-10-18 RX ORDER — ONDANSETRON 8 MG/1
4 TABLET, FILM COATED ORAL ONCE
Refills: 0 | Status: DISCONTINUED | OUTPATIENT
Start: 2021-10-18 | End: 2021-10-18

## 2021-10-18 RX ORDER — OXYCODONE HYDROCHLORIDE 5 MG/1
5 TABLET ORAL ONCE
Refills: 0 | Status: DISCONTINUED | OUTPATIENT
Start: 2021-10-18 | End: 2021-10-18

## 2021-10-18 RX ORDER — ACETAMINOPHEN 500 MG
1000 TABLET ORAL ONCE
Refills: 0 | Status: COMPLETED | OUTPATIENT
Start: 2021-10-18 | End: 2021-10-18

## 2021-10-18 RX ADMIN — OXYCODONE HYDROCHLORIDE 5 MILLIGRAM(S): 5 TABLET ORAL at 15:50

## 2021-10-18 RX ADMIN — GABAPENTIN 100 MILLIGRAM(S): 400 CAPSULE ORAL at 18:00

## 2021-10-18 RX ADMIN — Medication 200 MILLIGRAM(S): at 18:00

## 2021-10-18 RX ADMIN — FENTANYL CITRATE 50 MICROGRAM(S): 50 INJECTION INTRAVENOUS at 15:50

## 2021-10-18 RX ADMIN — OXYCODONE AND ACETAMINOPHEN 2 TABLET(S): 5; 325 TABLET ORAL at 00:58

## 2021-10-18 RX ADMIN — ALBUTEROL 2 PUFF(S): 90 AEROSOL, METERED ORAL at 21:23

## 2021-10-18 RX ADMIN — ALBUTEROL 2 PUFF(S): 90 AEROSOL, METERED ORAL at 13:44

## 2021-10-18 RX ADMIN — AMLODIPINE BESYLATE 10 MILLIGRAM(S): 2.5 TABLET ORAL at 09:33

## 2021-10-18 RX ADMIN — PANTOPRAZOLE SODIUM 40 MILLIGRAM(S): 20 TABLET, DELAYED RELEASE ORAL at 09:33

## 2021-10-18 RX ADMIN — CEFTRIAXONE 1000 MILLIGRAM(S): 500 INJECTION, POWDER, FOR SOLUTION INTRAMUSCULAR; INTRAVENOUS at 23:50

## 2021-10-18 RX ADMIN — HEPARIN SODIUM 5000 UNIT(S): 5000 INJECTION INTRAVENOUS; SUBCUTANEOUS at 23:50

## 2021-10-18 RX ADMIN — Medication 1 APPLICATION(S): at 17:47

## 2021-10-18 RX ADMIN — SODIUM CHLORIDE 50 MILLILITER(S): 9 INJECTION INTRAMUSCULAR; INTRAVENOUS; SUBCUTANEOUS at 23:59

## 2021-10-18 RX ADMIN — Medication 2 MILLIGRAM(S): at 20:25

## 2021-10-18 RX ADMIN — GABAPENTIN 100 MILLIGRAM(S): 400 CAPSULE ORAL at 23:54

## 2021-10-18 RX ADMIN — OXYCODONE AND ACETAMINOPHEN 2 TABLET(S): 5; 325 TABLET ORAL at 23:50

## 2021-10-18 RX ADMIN — OXYCODONE AND ACETAMINOPHEN 2 TABLET(S): 5; 325 TABLET ORAL at 17:59

## 2021-10-18 RX ADMIN — Medication 400 MILLIGRAM(S): at 15:43

## 2021-10-18 RX ADMIN — SODIUM CHLORIDE 50 MILLILITER(S): 9 INJECTION INTRAMUSCULAR; INTRAVENOUS; SUBCUTANEOUS at 05:06

## 2021-10-18 RX ADMIN — OXYCODONE AND ACETAMINOPHEN 2 TABLET(S): 5; 325 TABLET ORAL at 09:32

## 2021-10-18 RX ADMIN — ALBUTEROL 2 PUFF(S): 90 AEROSOL, METERED ORAL at 09:25

## 2021-10-18 NOTE — PROGRESS NOTE ADULT - SUBJECTIVE AND OBJECTIVE BOX
Date of service: 10-18-21 @ 13:36    Lying in bed in NAD  Has low grade fever  Has pedal edema  Dose not seem in pain  Was reported with bacteremia  s/p vancomycin IV 10/15-10/17    ROS: no fever or chills; denies dizziness, no HA, no SOB or cough, no abdominal pain, no diarrhea or constipation; no dysuria, no legs pain, no rashes    MEDICATIONS  (STANDING):  ALBUTerol    90 MICROgram(s) HFA Inhaler 2 Puff(s) Inhalation every 6 hours  amLODIPine   Tablet 10 milliGRAM(s) Oral daily  cefTRIAXone Injectable. 1000 milliGRAM(s) IV Push every 24 hours  collagenase Ointment 1 Application(s) Topical daily  doxycycline hyclate Capsule 100 milliGRAM(s) Oral every 12 hours  gabapentin 100 milliGRAM(s) Oral three times a day  heparin   Injectable 5000 Unit(s) SubCutaneous every 12 hours  hydroxychloroquine 200 milliGRAM(s) Oral daily  influenza   Vaccine 0.5 milliLiter(s) IntraMuscular once  pantoprazole    Tablet 40 milliGRAM(s) Oral before breakfast  sodium chloride 0.9%. 1000 milliLiter(s) (50 mL/Hr) IV Continuous <Continuous>    Vital Signs Last 24 Hrs  T(C): 36.7 (18 Oct 2021 07:47), Max: 37.2 (17 Oct 2021 15:57)  T(F): 98 (18 Oct 2021 07:47), Max: 99 (17 Oct 2021 15:57)  HR: 105 (18 Oct 2021 07:47) (74 - 128)  BP: 145/94 (18 Oct 2021 07:47) (136/97 - 147/96)  BP(mean): --  RR: 18 (18 Oct 2021 07:47) (18 - 18)  SpO2: 92% (18 Oct 2021 07:47) (92% - 100%)     Physical exam:    Constitutional:  No acute distress  HEENT: NC/AT, EOMI, PERRLA, conjunctivae clear; ears and nose atraumatic; pharynx benign  Neck: supple; thyroid not palpable  Back: no tenderness  Respiratory: respiratory effort normal; clear to auscultation  Cardiovascular: S1S2 regular, no murmurs  Abdomen: soft, not tender, not distended, positive BS; no liver or spleen organomegaly  Genitourinary: no suprapubic tenderness  Lymphatic: no LN palpable  Musculoskeletal: no muscle tenderness, no joint swelling or tenderness  Coccyx full-thickness wound measuring [id number].  Wound with 100% black eschar. No odor, periwound intact; no surrounding erythema.   Extremities: 2+ pedal edema  Neurological/ Psychiatric: AxOx3, judgement and insight normal; moving all extremities  Skin: no rashes; no palpable lesions    Labs: reviewed                        8.4    5.36  )-----------( 257      ( 17 Oct 2021 08:44 )             26.5     10-18    138  |  105  |  77<H>  ----------------------------<  87  4.8   |  24  |  6.18<H>    Ca    8.1<L>      18 Oct 2021 09:10    TPro  6.3  /  Alb  1.9<L>  /  TBili  0.3  /  DBili  <0.1  /  AST  33  /  ALT  22  /  AlkPhos  110  10-17      Vancomycin Level, Trough: 38.2 ug/mL (10-17 @ 11:17)    C-Reactive Protein, Serum: 55 mg/L (10-13-21 @ 09:45)  C-Reactive Protein, Serum: 76 mg/L (10-12-21 @ 09:54)                        8.2    x     )-----------( x        ( 13 Oct 2021 01:33 )             24.7     10-12    134<L>  |  104  |  106<H>  ----------------------------<  108<H>  4.5   |  15<L>  |  6.51<H>    Ca    7.0<L>      12 Oct 2021 09:54  Phos  7.7     10-12    TPro  7.4  /  Alb  2.5<L>  /  TBili  0.2  /  DBili  x   /  AST  23  /  ALT  13  /  AlkPhos  60  10-11     LIVER FUNCTIONS - ( 11 Oct 2021 22:25 )  Alb: 2.5 g/dL / Pro: 7.4 gm/dL / ALK PHOS: 60 U/L / ALT: 13 U/L / AST: 23 U/L / GGT: x           Urinalysis Basic - ( 11 Oct 2021 22:25 )    Color: Yellow / Appearance: Slightly Turbid / S.015 / pH: x  Gluc: x / Ketone: Negative  / Bili: Negative / Urobili: Negative mg/dL   Blood: x / Protein: 500 mg/dL / Nitrite: Negative   Leuk Esterase: Moderate / RBC: >50 /HPF / WBC 26-50   Sq Epi: x / Non Sq Epi: Occasional / Bacteria: TNTC      COVID-19 PCR: NotDetec (10-11-21 @ 22:25)      Culture - Blood (collected 15 Oct 2021 12:02)  Source: .Blood None  Preliminary Report (16 Oct 2021 17:01):    No growth to date.    Culture - Blood (collected 15 Oct 2021 12:02)  Source: .Blood None  Preliminary Report (16 Oct 2021 17:01):    No growth to date.    GI PCR Panel, Stool (collected 14 Oct 2021 12:30)  Source: .Stool Feces  Final Report (14 Oct 2021 18:18):    GI PCR Results: NOT detected    Culture - Blood (collected 13 Oct 2021 11:48)  Source: .Blood None  Gram Stain (15 Oct 2021 01:29):    Growth in aerobic bottle: Gram Positive Cocci in Clusters  Final Report (15 Oct 2021 21:28):    Growth in aerobic bottle: Staphylococcus epidermidis    Coag Negative Staphylococcus    Single set isolate, possible contaminant. Contact    Microbiology if susceptibility testing clinically    indicated.  Organism: Blood Culture PCR (15 Oct 2021 21:28)      -  Staphylococcus epidermidis, Methicillin resistant: Detec      Method Type: PCR    Culture - Blood (collected 13 Oct 2021 11:48)  Source: .Blood None  Preliminary Report (14 Oct 2021 17:02):    No growth to date.    Culture - Urine (collected 11 Oct 2021 22:25)  Source: Clean Catch None  Final Report (14 Oct 2021 17:23):    >100,000 CFU/ml Enterobacter cloacae complex  Organism: Enterobacter cloacae complex (14 Oct 2021 17:23)  Organism: Enterobacter cloacae complex (14 Oct 2021 17:23)      -  Amikacin: S <=16      -  Amoxicillin/Clavulanic Acid: R >16/8      -  Ampicillin: R 16 These ampicillin results predict results for amoxicillin      -  Ampicillin/Sulbactam: R >16/8 Enterobacter, Citrobacter, and Serratia may develop resistance during prolonged therapy (3-4 days)      -  Aztreonam: S <=4      -  Cefazolin: R >16      -  Cefepime: S <=2      -  Cefoxitin: R >16      -  Ceftriaxone: S <=1 Enterobacter, Citrobacter, and Serratia may develop resistance during prolonged therapy      -  Ciprofloxacin: S <=0.25      -  Ertapenem: S <=0.5      -  Gentamicin: S <=2      -  Imipenem: S <=1      -  Levofloxacin: S <=0.5      -  Meropenem: S <=1      -  Nitrofurantoin: S <=32 Should not be used to treat pyelonephritis      -  Piperacillin/Tazobactam: S <=8      -  Tigecycline: S <=2      -  Tobramycin: S <=2      -  Trimethoprim/Sulfamethoxazole: S <=0.5/9.5      Method Type: KRYSTEN        Radiology: all available radiological tests reviewed    < from: CT Abdomen and Pelvis No Cont (10.12.21 @ 00:01) >  4 mm right lower lobe nodule (2:7). New since previous exam. This is indeterminant.  Subtletree-in-bud type opacities in the right lower lobe almost completely resolved when compared to the previous exam from 2020.  No hydronephrosis. 2 mm nonobstructing left renal mid segment calcification.  Ill-defined left para-aortic soft tissue may represent lymphadenopathy. This is very poorly evaluated on this exam. Mildly enlarged bilateral pelvic and inguinal lymph nodes.. Further evaluation is needed contrast-enhanced examination is recommended.  < end of copied text >    < from: MR Pelvis Bony Only No Cont (10.13.21 @ 14:58) >  1.  Sacral decubitus ulcer, as described above.  2.  No MR evidence of acute osteomyelitis.  3.  Moderate to large amount of pelvic free fluid.    < end of copied text >      Advanced directives addressed: full resuscitation

## 2021-10-18 NOTE — PROGRESS NOTE ADULT - ASSESSMENT
39 female with anemia, HTN, RA, SLE was told to go to ED for evaluation of elevated creatinine from rheum office after long absence of followup   Cr 10- was 5.8 where baseline < 1.0.     NATALIA  vs NATALIA on CKD  Cr had improved slightly but now rising   differential - multifactorial etio   NSAID use +/-  lupus nephritis +/- cocaine/heroin  now + elevated  Vanco level  Per Dr. Pena renal bx today by IR   no acute indication for HD at this time but if Cr continues to rise may need consider soon    Metab acidosis resolved   low rate  NS if GI losses/ npo remains    SLE   Rheum followup

## 2021-10-18 NOTE — PROGRESS NOTE ADULT - ASSESSMENT
Assessment/Plan:   40 y/o woman with hx of RA and SLE w/ Rheum, Dr. Del Rosario coming in w/ high Creat w/ Proteinuria > 7gm w/ Hematuria w/ low C3/C4 w/ UTI.  -Renal inusff thought to be 2/2 to NSAIDs vs eval for lupus nephritis.  Patient to have renal biopsy today.    -DSDNA elevated, SSA, SSB+, complements low.  -Consider IV methylprednisolone 1mg/kg daily for lupus flare.  Will d/w renal.  Discussed with patient and she is agreeable.    Discussed with Dr. Hancock who has no objection to steroid use at this point.    -continue HCQ 200mg daily, closer to wt based   will follow

## 2021-10-18 NOTE — PROGRESS NOTE ADULT - SUBJECTIVE AND OBJECTIVE BOX
Cheif complaints and Diagnosis: ARF/ Lupus/ IVDA    Subjective: no complaints; waiting for renal biopsy      REVIEW OF SYSTEMS:    CONSTITUTIONAL: No weakness, fevers or chills  EYES/ENT: No visual changes;  No vertigo or throat pain   NECK: No pain or stiffness  RESPIRATORY: No cough, wheezing, hemoptysis; No shortness of breath  CARDIOVASCULAR: No chest pain or palpitations  GASTROINTESTINAL: No abdominal or epigastric pain. No nausea, vomiting, or hematemesis; No diarrhea or constipation. No melena or hematochezia.  GENITOURINARY: No dysuria, frequency or hematuria  NEUROLOGICAL: No numbness or weakness  SKIN: No itching, burning, rashes, or lesions   All other review of systems is negative unless indicated above      Vital Signs Last 24 Hrs  T(C): 37.2 (18 Oct 2021 16:36), Max: 37.5 (18 Oct 2021 15:35)  T(F): 99 (18 Oct 2021 16:36), Max: 99.5 (18 Oct 2021 15:35)  HR: 103 (18 Oct 2021 16:36) (74 - 128)  BP: 134/86 (18 Oct 2021 16:36) (122/77 - 147/96)  BP(mean): --  RR: 18 (18 Oct 2021 16:36) (18 - 23)  SpO2: 100% (18 Oct 2021 16:36) (92% - 100%)    HEENT:   pupils equal and reactive, EOMI, no oropharyngeal lesions, erythema, exudates, oral thrush    NECK:   supple, no carotid bruits, no palpable lymph nodes, no thyromegaly    CV:  +S1, +S2, regular, no murmurs or rubs    RESP:   lungs clear to auscultation bilaterally, no wheezing, rales, rhonchi, good air entry bilaterally    BREAST:  not examined    GI:  abdomen soft, non-tender, non-distended, normal BS, no bruits, no abdominal masses, no palpable masses    RECTAL:  not examined    :  not examined    MSK:   normal muscle tone, no atrophy, no rigidity, no contractions    EXT:   no clubbing, no cyanosis, no edema, no calf pain, swelling or erythema    VASCULAR:  pulses equal and symmetric in the upper and lower extremities    NEURO:  AAOX3, no focal neurological deficits, follows all commands, able to move extremities spontaneously    SKIN:  no ulcers, lesions or rashes    MEDICATIONS  (STANDING):  ALBUTerol    90 MICROgram(s) HFA Inhaler 2 Puff(s) Inhalation every 6 hours  amLODIPine   Tablet 10 milliGRAM(s) Oral daily  cefTRIAXone Injectable. 1000 milliGRAM(s) IV Push every 24 hours  collagenase Ointment 1 Application(s) Topical daily  gabapentin 100 milliGRAM(s) Oral three times a day  heparin   Injectable 5000 Unit(s) SubCutaneous every 12 hours  hydroxychloroquine 200 milliGRAM(s) Oral daily  influenza   Vaccine 0.5 milliLiter(s) IntraMuscular once  pantoprazole    Tablet 40 milliGRAM(s) Oral before breakfast  sodium chloride 0.9%. 1000 milliLiter(s) (50 mL/Hr) IV Continuous <Continuous>    MEDICATIONS  (PRN):  acetaminophen   Tablet .. 650 milliGRAM(s) Oral every 6 hours PRN Temp greater or equal to 38C (100.4F), Mild Pain (1 - 3)  ALBUTerol    90 MICROgram(s) HFA Inhaler 1 Puff(s) Inhalation every 6 hours PRN SOB/Wheezing/Congestion  diphenhydrAMINE 25 milliGRAM(s) Oral every 4 hours PRN Rash and/or Itching  loperamide 2 milliGRAM(s) Oral three times a day PRN Diarrhea  melatonin 3 milliGRAM(s) Oral at bedtime PRN Insomnia  ondansetron Injectable 4 milliGRAM(s) IV Push every 8 hours PRN Nausea and/or Vomiting  oxycodone    5 mG/acetaminophen 325 mG 2 Tablet(s) Oral every 6 hours PRN Severe Pain (7 - 10)      18 Oct 2021 09:10    138    |  105    |  77     ----------------------------<  87     4.8     |  24     |  6.18     Ca    8.1        18 Oct 2021 09:10    TPro  6.3    /  Alb  1.9    /  TBili  0.3    /  DBili  <0.1   /  AST  33     /  ALT  22     /  AlkPhos  110    17 Oct 2021 08:44  LIVER FUNCTIONS - ( 17 Oct 2021 08:44 )  Alb: 1.9 g/dL / Pro: 6.3 gm/dL / ALK PHOS: 110 U/L / ALT: 22 U/L / AST: 33 U/L / GGT: x         PT/INR - ( 17 Oct 2021 08:44 )   PT: 12.7 sec;   INR: 1.09 ratio         PTT - ( 17 Oct 2021 08:44 )  PTT:31.1 secCBC Full  -  ( 17 Oct 2021 08:44 )  WBC Count : 5.36 K/uL  Hemoglobin : 8.4 g/dL  Hematocrit : 26.5 %  Platelet Count - Automated : 257 K/uL  Mean Cell Volume : 86.6 fl  Mean Cell Hemoglobin : 27.5 pg  Mean Cell Hemoglobin Concentration : 31.7 gm/dL        PT/INR - ( 17 Oct 2021 08:44 )   PT: 12.7 sec;   INR: 1.09 ratio         PTT - ( 17 Oct 2021 08:44 )  PTT:31.1 sec          Assessment/Plan    40 y/o woman with hx of IVDA, MRSA, ESBL,  RA and SLE w/ Rheum, Dr. Del Rosario coming in w/ high Creat w/ Proteinuria      ARF:  -Protienuria > 7gm w/ Hematuria w/ low C3/C4 w/ UTI.  -cr 6.18, not improving; patient is however making a lot of urine.   -renal biopsy today   -DSDNA elevated, SSA, SSB+, complements low.  -Rhemuatology considering started steroids for Lupus flare up.   -continue HCQ 200mg daily        #Reported Heroine abuse  -Patient denies any recent drug use despite positive UDS  -UDS positive for cocaine and marijuana      #UTI  Urine c/s  with >100K Enterobacter  Cont IV Rocephin for now till cultures are available  ID eval for further IV antibiotics    #LT knee draining boil. Stable  H/o MRSA before  PO Doxycycline    #Anemia likely chronic due to chronic disease.   s/p 2 units of pRBCs  No overt signs of bleediing  Monitor HH    #Thrombocytopenia- likely chronic  Monitor counts    #HTN- cont current meds    #DVT proph- heparin SQ    #Dispo- MRI negative for osteo. Mild improvement in Cr. Nephrology/ID/Rhematology recs. Continue IVF. . Renal biopsy tentatively 10/18.

## 2021-10-18 NOTE — PROGRESS NOTE ADULT - SUBJECTIVE AND OBJECTIVE BOX
Patient is a 41y Female who reports no complaints as new. Frustrated by NPO        MEDICATIONS  (STANDING):  ALBUTerol    90 MICROgram(s) HFA Inhaler 2 Puff(s) Inhalation every 6 hours  amLODIPine   Tablet 10 milliGRAM(s) Oral daily  cefTRIAXone Injectable. 1000 milliGRAM(s) IV Push every 24 hours  collagenase Ointment 1 Application(s) Topical daily  doxycycline hyclate Capsule 100 milliGRAM(s) Oral every 12 hours  gabapentin 100 milliGRAM(s) Oral three times a day  heparin   Injectable 5000 Unit(s) SubCutaneous every 12 hours  hydroxychloroquine 200 milliGRAM(s) Oral daily  influenza   Vaccine 0.5 milliLiter(s) IntraMuscular once  pantoprazole    Tablet 40 milliGRAM(s) Oral before breakfast  sodium chloride 0.9%. 1000 milliLiter(s) (50 mL/Hr) IV Continuous <Continuous>    MEDICATIONS  (PRN):  acetaminophen   Tablet .. 650 milliGRAM(s) Oral every 6 hours PRN Temp greater or equal to 38C (100.4F), Mild Pain (1 - 3)  ALBUTerol    90 MICROgram(s) HFA Inhaler 1 Puff(s) Inhalation every 6 hours PRN SOB/Wheezing/Congestion  diphenhydrAMINE 25 milliGRAM(s) Oral every 4 hours PRN Rash and/or Itching  loperamide 2 milliGRAM(s) Oral three times a day PRN Diarrhea  melatonin 3 milliGRAM(s) Oral at bedtime PRN Insomnia  ondansetron Injectable 4 milliGRAM(s) IV Push every 8 hours PRN Nausea and/or Vomiting  oxycodone    5 mG/acetaminophen 325 mG 2 Tablet(s) Oral every 6 hours PRN Severe Pain (7 - 10)        T(C): , Max: 37.2 (10-17-21 @ 15:57)  T(F): , Max: 99 (10-17-21 @ 15:57)  HR: 105 (10-18-21 @ 07:47)  BP: 145/94 (10-18-21 @ 07:47)  BP(mean): --  RR: 18 (10-18-21 @ 07:47)  SpO2: 92% (10-18-21 @ 07:47)  Wt(kg): --    10-17 @ 07:01  -  10-18 @ 07:00  --------------------------------------------------------  IN: 600 mL / OUT: 500 mL / NET: 100 mL          PHYSICAL EXAM:    Constitutional: NAD, >>then stated age  HEENT: PERRLA, EOMI,  MMM  Neck: No LAD, No JVD  Respiratory: dist  Cardiovascular: S1 and S2,  Extremities: peripheral edema  Neurological: A/O x 3          LABS:                        8.4    5.36  )-----------( 257      ( 17 Oct 2021 08:44 )             26.5     18 Oct 2021 09:10    138    |  105    |  77     ----------------------------<  87     4.8     |  24     |  6.18   17 Oct 2021 08:44    139    |  102    |  77     ----------------------------<  84     4.5     |  29     |  6.06   16 Oct 2021 08:33    138    |  97     |  76     ----------------------------<  91     3.1     |  31     |  5.86   15 Oct 2021 12:02    137    |  99     |  83     ----------------------------<  98     3.1     |  28     |  5.50     Ca    8.1        18 Oct 2021 09:10  Ca    7.9        17 Oct 2021 08:44  Ca    7.6        16 Oct 2021 08:33  Ca    6.8        15 Oct 2021 12:02  Phos  4.6       15 Oct 2021 12:02  Mg     1.8       15 Oct 2021 12:02    TPro  6.3    /  Alb  1.9    /  TBili  0.3    /  DBili  <0.1   /  AST  33     /  ALT  22     /  AlkPhos  110    17 Oct 2021 08:44  TPro  6.4    /  Alb  2.0    /  TBili  0.3    /  DBili  <0.1   /  AST  103    /  ALT  29     /  AlkPhos  142    16 Oct 2021 08:33          Urine Studies:          RADIOLOGY & ADDITIONAL STUDIES:

## 2021-10-18 NOTE — PROGRESS NOTE ADULT - SUBJECTIVE AND OBJECTIVE BOX
CHIEF COMPLAINT:    SUBJECTIVE:   Patient reports feeling well.   Denies joint pain.  Denies rash.  Reports one oral sore on her tongue.  States she had been off all lupus meds for about 1 year, and only recently started back on plaquenil.    Denies prior hx of renal disease.  States lupus symptoms in the past have been rash, oral sore, joint pain.      REVIEW OF SYSTEMS:  negative except as above.      Vital Signs Last 24 Hrs  T(C): 36.7 (18 Oct 2021 07:47), Max: 37.2 (17 Oct 2021 15:57)  T(F): 98 (18 Oct 2021 07:47), Max: 99 (17 Oct 2021 15:57)  HR: 105 (18 Oct 2021 07:47) (74 - 128)  BP: 145/94 (18 Oct 2021 07:47) (136/97 - 147/96)  BP(mean): --  RR: 18 (18 Oct 2021 07:47) (18 - 18)  SpO2: 92% (18 Oct 2021 07:47) (92% - 100%)    I&O's Summary    17 Oct 2021 07:01  -  18 Oct 2021 07:00  --------------------------------------------------------  IN: 600 mL / OUT: 500 mL / NET: 100 mL        CAPILLARY BLOOD GLUCOSE            MEDICATIONS:  MEDICATIONS  (STANDING):  ALBUTerol    90 MICROgram(s) HFA Inhaler 2 Puff(s) Inhalation every 6 hours  amLODIPine   Tablet 10 milliGRAM(s) Oral daily  cefTRIAXone Injectable. 1000 milliGRAM(s) IV Push every 24 hours  collagenase Ointment 1 Application(s) Topical daily  gabapentin 100 milliGRAM(s) Oral three times a day  heparin   Injectable 5000 Unit(s) SubCutaneous every 12 hours  hydroxychloroquine 200 milliGRAM(s) Oral daily  influenza   Vaccine 0.5 milliLiter(s) IntraMuscular once  pantoprazole    Tablet 40 milliGRAM(s) Oral before breakfast  sodium chloride 0.9%. 1000 milliLiter(s) (50 mL/Hr) IV Continuous <Continuous>      LABS: All Labs Reviewed:                        8.4    5.36  )-----------( 257      ( 17 Oct 2021 08:44 )             26.5     10-18    138  |  105  |  77<H>  ----------------------------<  87  4.8   |  24  |  6.18<H>    Ca    8.1<L>      18 Oct 2021 09:10    TPro  6.3  /  Alb  1.9<L>  /  TBili  0.3  /  DBili  <0.1  /  AST  33  /  ALT  22  /  AlkPhos  110  10-17    PT/INR - ( 17 Oct 2021 08:44 )   PT: 12.7 sec;   INR: 1.09 ratio         PTT - ( 17 Oct 2021 08:44 )  PTT:31.1 sec      Blood Culture: 10-15 @ 12:02  Organism --  Gram Stain Blood -- Gram Stain --  Specimen Source .Blood None  Culture-Blood --    10-14 @ 12:30  Organism --  Gram Stain Blood -- Gram Stain --  Specimen Source .Stool Feces  Culture-Blood --        RADIOLOGY/EKG:    A/P:

## 2021-10-18 NOTE — BRIEF OPERATIVE NOTE - OPERATION/FINDINGS
18G core of R kidney with CT guidance, adequate per pathology attending review. D-stat tract embolization.

## 2021-10-18 NOTE — PROGRESS NOTE ADULT - ASSESSMENT
41 y/o female with h/o anemia, HTN, RA, SLE on hydroxychloroquine, OA was admitted on 10/12 for elevated creatinine. Cr 10- was 5.8 where baseline < 1.0. She has b/l knees pain. She has knees and shoulders and wrists; not able to use crutches or a walker so has been using a wheelchair. Noted with coccyx decubitus. Concern for underlying infection was raised. She received ceftriaxone and doxycycline.     1. Low grade fever. Sacral decubitus, unstageable ulcer. ARF likely related to SLE. ?underlying chronic kidney disease.  -bacteremia with CNST in one BC bottle only - likely contaminant  -active drug user  -no surrounding cellulitis  -no underlying bone infection noted on MRI  -no clinical signs of sepsis  -on ceftriaxone IV and doxycycline PO # 7  -tolerating abx well so far; no side effects noted  -s/p vancomycin 10/15-10/17  -renal evaluation - plan for kidney biopsy today  -local wound care  -d/c doxycycline   -continue ceftriaxone until tomorrow and d/c after her kidney biopsy  -monitor temps  -f/u CBC  -supportive care  2. Other issues:   -care per medicine     39 y/o female with h/o anemia, HTN, RA, SLE on hydroxychloroquine, OA was admitted on 10/12 for elevated creatinine. Cr 10- was 5.8 where baseline < 1.0. She has b/l knees pain. She has knees and shoulders and wrists; not able to use crutches or a walker so has been using a wheelchair. Noted with coccyx decubitus. Concern for underlying infection was raised. She received ceftriaxone and doxycycline.     1. UTI with ENCL. Sacral decubitus, unstageable ulcer. ARF likely related to SLE. ?underlying chronic kidney disease.  -bacteremia with CNST in one BC bottle only - likely contaminant  -active drug user  -no surrounding cellulitis  -no underlying bone infection noted on MRI  -no clinical signs of sepsis  -on ceftriaxone IV and doxycycline PO # 7  -tolerating abx well so far; no side effects noted  -s/p vancomycin 10/15-10/17  -renal evaluation - plan for kidney biopsy today  -local wound care  -d/c doxycycline   -continue ceftriaxone until tomorrow and d/c after her kidney biopsy  -monitor temps  -f/u CBC  -supportive care  2. Other issues:   -care per medicine

## 2021-10-19 LAB
ANION GAP SERPL CALC-SCNC: 9 MMOL/L — SIGNIFICANT CHANGE UP (ref 5–17)
BUN SERPL-MCNC: 75 MG/DL — HIGH (ref 7–23)
CALCIUM SERPL-MCNC: 7.7 MG/DL — LOW (ref 8.5–10.1)
CHLORIDE SERPL-SCNC: 107 MMOL/L — SIGNIFICANT CHANGE UP (ref 96–108)
CO2 SERPL-SCNC: 23 MMOL/L — SIGNIFICANT CHANGE UP (ref 22–31)
CREAT SERPL-MCNC: 6.47 MG/DL — HIGH (ref 0.5–1.3)
GLUCOSE SERPL-MCNC: 88 MG/DL — SIGNIFICANT CHANGE UP (ref 70–99)
HCT VFR BLD CALC: 25.9 % — LOW (ref 34.5–45)
HCT VFR BLD CALC: 28.3 % — LOW (ref 34.5–45)
HCT VFR BLD CALC: 28.6 % — LOW (ref 34.5–45)
HGB BLD-MCNC: 8.1 G/DL — LOW (ref 11.5–15.5)
HGB BLD-MCNC: 8.9 G/DL — LOW (ref 11.5–15.5)
HGB BLD-MCNC: 9.1 G/DL — LOW (ref 11.5–15.5)
MPO AB + PR3 PNL SER: SIGNIFICANT CHANGE UP
POTASSIUM SERPL-MCNC: 4.7 MMOL/L — SIGNIFICANT CHANGE UP (ref 3.5–5.3)
POTASSIUM SERPL-SCNC: 4.7 MMOL/L — SIGNIFICANT CHANGE UP (ref 3.5–5.3)
SODIUM SERPL-SCNC: 139 MMOL/L — SIGNIFICANT CHANGE UP (ref 135–145)

## 2021-10-19 PROCEDURE — 99233 SBSQ HOSP IP/OBS HIGH 50: CPT

## 2021-10-19 RX ADMIN — Medication 1 APPLICATION(S): at 10:08

## 2021-10-19 RX ADMIN — ALBUTEROL 2 PUFF(S): 90 AEROSOL, METERED ORAL at 20:41

## 2021-10-19 RX ADMIN — PANTOPRAZOLE SODIUM 40 MILLIGRAM(S): 20 TABLET, DELAYED RELEASE ORAL at 05:44

## 2021-10-19 RX ADMIN — Medication 3 MILLIGRAM(S): at 21:24

## 2021-10-19 RX ADMIN — OXYCODONE AND ACETAMINOPHEN 2 TABLET(S): 5; 325 TABLET ORAL at 19:30

## 2021-10-19 RX ADMIN — GABAPENTIN 100 MILLIGRAM(S): 400 CAPSULE ORAL at 21:24

## 2021-10-19 RX ADMIN — OXYCODONE AND ACETAMINOPHEN 2 TABLET(S): 5; 325 TABLET ORAL at 05:47

## 2021-10-19 RX ADMIN — GABAPENTIN 100 MILLIGRAM(S): 400 CAPSULE ORAL at 12:28

## 2021-10-19 RX ADMIN — Medication 3 MILLIGRAM(S): at 00:01

## 2021-10-19 RX ADMIN — Medication 60 MILLIGRAM(S): at 10:08

## 2021-10-19 RX ADMIN — OXYCODONE AND ACETAMINOPHEN 2 TABLET(S): 5; 325 TABLET ORAL at 12:29

## 2021-10-19 RX ADMIN — Medication 200 MILLIGRAM(S): at 09:40

## 2021-10-19 RX ADMIN — AMLODIPINE BESYLATE 10 MILLIGRAM(S): 2.5 TABLET ORAL at 09:40

## 2021-10-19 RX ADMIN — ALBUTEROL 2 PUFF(S): 90 AEROSOL, METERED ORAL at 13:08

## 2021-10-19 RX ADMIN — GABAPENTIN 100 MILLIGRAM(S): 400 CAPSULE ORAL at 05:44

## 2021-10-19 NOTE — PROGRESS NOTE ADULT - ASSESSMENT
39 female with anemia, HTN, RA, SLE was told to go to ED for evaluation of elevated creatinine from rheum office after long absence of followup       NATALIA    Cr had improved slightly but now rising  differential - multifactorial etio   NSAID use +/-  lupus nephritis +/- cocaine/heroin  now + elevated  Vanco level  F/u renal biopsy results, no issue with trial of steroids as suggested by rheum  no acute indication for HD at this time but if Cr continues to rise may need consider soon      SLE   Rheum noted    Case d/c with medical attending, rheum attending   seen this AM, note now

## 2021-10-19 NOTE — PROGRESS NOTE ADULT - ASSESSMENT
40 y/o woman with hx of RA and SLE w/ Rheum, Dr. Del Rosario coming in w/ high Creat w/ Proteinuria > 7gm w/ Hematuria w/ low C3/C4 w/ UTI.    -Renal inusff thought to be 2/2 to NSAIDs vs eval for lupus nephritis.  -DSDNA elevated, SSA, SSB+, complements low.  -f/u renal biopsy performed 10/18/21  -Patient was started on methylprednisolone 60mg IV daily, discussed with Dr. Soni and Santi.  She is tolerating it well.    -Monitor glucose, maintain PPI while on steroid  -continue HCQ 200mg daily, closer to wt based   will follow

## 2021-10-19 NOTE — PROGRESS NOTE ADULT - SUBJECTIVE AND OBJECTIVE BOX
CHIEF COMPLAINT:    SUBJECTIVE:    Patient denies any new complaints.  Had some hematuria, but was later told it was due to the renal biopsy.      REVIEW OF SYSTEMS:  negative except as above.        Vital Signs Last 24 Hrs  T(C): 36.7 (19 Oct 2021 16:10), Max: 36.7 (19 Oct 2021 16:10)  T(F): 98.1 (19 Oct 2021 16:10), Max: 98.1 (19 Oct 2021 16:10)  HR: 117 (19 Oct 2021 16:10) (98 - 117)  BP: 134/94 (19 Oct 2021 16:10) (125/85 - 134/94)  BP(mean): --  RR: 18 (19 Oct 2021 16:10) (18 - 18)  SpO2: 98% (19 Oct 2021 16:10) (93% - 98%)    I&O's Summary    18 Oct 2021 07:01  -  19 Oct 2021 07:00  --------------------------------------------------------  IN: 675 mL / OUT: 0 mL / NET: 675 mL        CAPILLARY BLOOD GLUCOSE          PHYSICAL EXAM:    Constitutional: NAD, awake and alert, well-developed  HEENT-tongue lesion   Heart- S1 S2 reg  Lungs- CTA b/l  Abd- Soft NT  Ext- no edema  Skin- no rashes  Musculoskelatal- FROM all joints, no active synovitis noted  Neurological- intact strength upper and lower extremities      MEDICATIONS:  MEDICATIONS  (STANDING):  ALBUTerol    90 MICROgram(s) HFA Inhaler 2 Puff(s) Inhalation every 6 hours  amLODIPine   Tablet 10 milliGRAM(s) Oral daily  collagenase Ointment 1 Application(s) Topical daily  gabapentin 100 milliGRAM(s) Oral three times a day  hydroxychloroquine 200 milliGRAM(s) Oral daily  influenza   Vaccine 0.5 milliLiter(s) IntraMuscular once  methylPREDNISolone sodium succinate Injectable 60 milliGRAM(s) IV Push daily  pantoprazole    Tablet 40 milliGRAM(s) Oral before breakfast      LABS: All Labs Reviewed:                        8.9    x     )-----------( x        ( 19 Oct 2021 11:21 )             28.3     10-19    139  |  107  |  75<H>  ----------------------------<  88  4.7   |  23  |  6.47<H>    Ca    7.7<L>      19 Oct 2021 05:18            Blood Culture: 10-15 @ 12:02  Organism --  Gram Stain Blood -- Gram Stain --  Specimen Source .Blood None  Culture-Blood --        RADIOLOGY/EKG:    A/P:

## 2021-10-19 NOTE — PROVIDER CONTACT NOTE (OTHER) - SITUATION
Pt had a BM and urinated, and found bright red blood in the toilet bowl
left message with Dr. Hancock's service

## 2021-10-19 NOTE — PROGRESS NOTE ADULT - SUBJECTIVE AND OBJECTIVE BOX
Patient is a 41y Female who reports no complaints as new. Intake fair//limited.   REVIEW OF SYSTEMS:    CONSTITUTIONAL: stable weakness, fevers or chills  RESPIRATORY: No cough, wheezing, hemoptysis; stable shortness of breath  CARDIOVASCULAR: No chest pain or palpitations  GENITOURINARY: No dysuria, frequency or hematuria  All other review of systems is negative unless indicated above.    MEDICATIONS  (STANDING):  ALBUTerol    90 MICROgram(s) HFA Inhaler 2 Puff(s) Inhalation every 6 hours  amLODIPine   Tablet 10 milliGRAM(s) Oral daily  collagenase Ointment 1 Application(s) Topical daily  gabapentin 100 milliGRAM(s) Oral three times a day  hydroxychloroquine 200 milliGRAM(s) Oral daily  influenza   Vaccine 0.5 milliLiter(s) IntraMuscular once  methylPREDNISolone sodium succinate Injectable 60 milliGRAM(s) IV Push daily  pantoprazole    Tablet 40 milliGRAM(s) Oral before breakfast    MEDICATIONS  (PRN):  acetaminophen   Tablet .. 650 milliGRAM(s) Oral every 6 hours PRN Temp greater or equal to 38C (100.4F), Mild Pain (1 - 3)  ALBUTerol    90 MICROgram(s) HFA Inhaler 1 Puff(s) Inhalation every 6 hours PRN SOB/Wheezing/Congestion  diphenhydrAMINE 25 milliGRAM(s) Oral every 4 hours PRN Rash and/or Itching  loperamide 2 milliGRAM(s) Oral three times a day PRN Diarrhea  melatonin 3 milliGRAM(s) Oral at bedtime PRN Insomnia  ondansetron Injectable 4 milliGRAM(s) IV Push every 8 hours PRN Nausea and/or Vomiting  oxycodone    5 mG/acetaminophen 325 mG 2 Tablet(s) Oral every 6 hours PRN Severe Pain (7 - 10)        T(C): , Max: 36.7 (10-19-21 @ 16:10)  T(F): , Max: 98.1 (10-19-21 @ 16:10)  HR: 115 (10-19-21 @ 20:40)  BP: 134/94 (10-19-21 @ 16:10)  BP(mean): --  RR: 18 (10-19-21 @ 16:10)  SpO2: 100% (10-19-21 @ 20:40)  Wt(kg): --    10-18 @ 07:01  -  10-19 @ 07:00  --------------------------------------------------------  IN: 675 mL / OUT: 0 mL / NET: 675 mL          PHYSICAL EXAM:    Constitutional: frail, >>then stated age  HEENT: tevin MM, poor dentition  Neck: No LAD, No JVD  Respiratory: dist  Cardiovascular: S1 and S2  Extremities:  peripheral edema  Neurological: Alert          LABS:                        9.1    x     )-----------( x        ( 19 Oct 2021 18:27 )             28.6     19 Oct 2021 05:18    139    |  107    |  75     ----------------------------<  88     4.7     |  23     |  6.47   18 Oct 2021 09:10    138    |  105    |  77     ----------------------------<  87     4.8     |  24     |  6.18   17 Oct 2021 08:44    139    |  102    |  77     ----------------------------<  84     4.5     |  29     |  6.06   16 Oct 2021 08:33    138    |  97     |  76     ----------------------------<  91     3.1     |  31     |  5.86     Ca    7.7        19 Oct 2021 05:18  Ca    8.1        18 Oct 2021 09:10  Ca    7.9        17 Oct 2021 08:44  Ca    7.6        16 Oct 2021 08:33    TPro  6.3    /  Alb  1.9    /  TBili  0.3    /  DBili  <0.1   /  AST  33     /  ALT  22     /  AlkPhos  110    17 Oct 2021 08:44  TPro  6.4    /  Alb  2.0    /  TBili  0.3    /  DBili  <0.1   /  AST  103    /  ALT  29     /  AlkPhos  142    16 Oct 2021 08:33          Urine Studies:          RADIOLOGY & ADDITIONAL STUDIES:

## 2021-10-19 NOTE — PROGRESS NOTE ADULT - SUBJECTIVE AND OBJECTIVE BOX
Cheif complaints and Diagnosis: ARF/ Lupus/ IVDA    Subjective: no complaints      REVIEW OF SYSTEMS:    CONSTITUTIONAL: No weakness, fevers or chills  EYES/ENT: No visual changes;  No vertigo or throat pain   NECK: No pain or stiffness  RESPIRATORY: No cough, wheezing, hemoptysis; No shortness of breath  CARDIOVASCULAR: No chest pain or palpitations  GASTROINTESTINAL: No abdominal or epigastric pain. No nausea, vomiting, or hematemesis; No diarrhea or constipation. No melena or hematochezia.  GENITOURINARY: No dysuria, frequency or hematuria  NEUROLOGICAL: No numbness or weakness  SKIN: No itching, burning, rashes, or lesions   All other review of systems is negative unless indicated above      Vital Signs Last 24 Hrs  T(C): 36.5 (18 Oct 2021 23:05), Max: 37.5 (18 Oct 2021 15:35)  T(F): 97.7 (18 Oct 2021 23:05), Max: 99.5 (18 Oct 2021 15:35)  HR: 105 (18 Oct 2021 23:05) (102 - 114)  BP: 126/87 (18 Oct 2021 23:05) (122/77 - 134/86)  BP(mean): --  RR: 18 (18 Oct 2021 23:05) (18 - 23)  SpO2: 96% (18 Oct 2021 23:05) (96% - 100%)    HEENT:   pupils equal and reactive, EOMI, no oropharyngeal lesions, erythema, exudates, oral thrush    NECK:   supple, no carotid bruits, no palpable lymph nodes, no thyromegaly    CV:  +S1, +S2, regular, no murmurs or rubs    RESP:   lungs clear to auscultation bilaterally, no wheezing, rales, rhonchi, good air entry bilaterally    BREAST:  not examined    GI:  abdomen soft, non-tender, non-distended, normal BS, no bruits, no abdominal masses, no palpable masses    RECTAL:  not examined    :  not examined    MSK:   normal muscle tone, no atrophy, no rigidity, no contractions    EXT:   no clubbing, no cyanosis, no edema, no calf pain, swelling or erythema    VASCULAR:  pulses equal and symmetric in the upper and lower extremities    NEURO:  AAOX3, no focal neurological deficits, follows all commands, able to move extremities spontaneously    SKIN:  no ulcers, lesions or rashes    MEDICATIONS  (STANDING):  ALBUTerol    90 MICROgram(s) HFA Inhaler 2 Puff(s) Inhalation every 6 hours  amLODIPine   Tablet 10 milliGRAM(s) Oral daily  collagenase Ointment 1 Application(s) Topical daily  gabapentin 100 milliGRAM(s) Oral three times a day  heparin   Injectable 5000 Unit(s) SubCutaneous every 12 hours  hydroxychloroquine 200 milliGRAM(s) Oral daily  influenza   Vaccine 0.5 milliLiter(s) IntraMuscular once  pantoprazole    Tablet 40 milliGRAM(s) Oral before breakfast  sodium chloride 0.9%. 1000 milliLiter(s) (50 mL/Hr) IV Continuous <Continuous>    MEDICATIONS  (PRN):  acetaminophen   Tablet .. 650 milliGRAM(s) Oral every 6 hours PRN Temp greater or equal to 38C (100.4F), Mild Pain (1 - 3)  ALBUTerol    90 MICROgram(s) HFA Inhaler 1 Puff(s) Inhalation every 6 hours PRN SOB/Wheezing/Congestion  diphenhydrAMINE 25 milliGRAM(s) Oral every 4 hours PRN Rash and/or Itching  loperamide 2 milliGRAM(s) Oral three times a day PRN Diarrhea  melatonin 3 milliGRAM(s) Oral at bedtime PRN Insomnia  ondansetron Injectable 4 milliGRAM(s) IV Push every 8 hours PRN Nausea and/or Vomiting  oxycodone    5 mG/acetaminophen 325 mG 2 Tablet(s) Oral every 6 hours PRN Severe Pain (7 - 10)      19 Oct 2021 05:18    139    |  107    |  75     ----------------------------<  88     4.7     |  23     |  6.47     Ca    7.7        19 Oct 2021 05:18    CBC Full  -  ( 19 Oct 2021 05:18 )  WBC Count : x  Hemoglobin : 8.1 g/dL  Hematocrit : 25.9 %  Platelet Count - Automated : x  Mean Cell Volume : x  Mean Cell Hemoglobin : x  Mean Cell Hemoglobin Concentration : x                Assessment/Plan    42 y/o woman with hx of IVDA, MRSA, ESBL,  RA and SLE w/ Rheum, Dr. Del Rosario coming in w/ high Creat w/ Proteinuria      ARF:  -Protienuria > 7gm w/ Hematuria w/ low C3/C4 w/ UTI.  -cr 6.47  not improving; patient is however making a lot of urine.   -renal biopsy 10/18  -DSDNA elevated, SSA, SSB+, complements low.  -Rhemuatology considering started steroids for Lupus flare up.   -continue HCQ 200mg daily      #Reported Heroine abuse  -Patient denies any recent drug use despite positive UDS  -UDS positive for cocaine and marijuana      #UTI  Urine c/s  with >100K Enterobacter  Cont IV Rocephin for now till cultures are available  ID eval for further IV antibiotics    #LT knee draining boil. Stable  H/o MRSA before  PO Doxycycline    #Anemia likely chronic due to chronic disease.   s/p 2 units of pRBCs  No overt signs of bleediing  Monitor HH    #Thrombocytopenia- likely chronic  Monitor counts    #HTN- cont current meds    #DVT proph- heparin SQ    #Dispo- MRI negative for osteo. Mild improvement in Cr. Nephrology/ID/Rhematology recs. Continue IVF. . Renal biopsy tentatively 10/18.

## 2021-10-19 NOTE — CHART NOTE - NSCHARTNOTEFT_GEN_A_CORE
patient with heavy bleeding in urine.   D/w IR, as per Patrick ROSS, bleeding can be normal for 48 hours.   If after 48 hours still bleeding , recco to d/w IR and get repeat CT abd/pelvis.     H+h q8h

## 2021-10-20 LAB
ANION GAP SERPL CALC-SCNC: 11 MMOL/L — SIGNIFICANT CHANGE UP (ref 5–17)
BUN SERPL-MCNC: 89 MG/DL — HIGH (ref 7–23)
CALCIUM SERPL-MCNC: 7.9 MG/DL — LOW (ref 8.5–10.1)
CHLORIDE SERPL-SCNC: 104 MMOL/L — SIGNIFICANT CHANGE UP (ref 96–108)
CO2 SERPL-SCNC: 20 MMOL/L — LOW (ref 22–31)
CREAT SERPL-MCNC: 7.01 MG/DL — HIGH (ref 0.5–1.3)
CULTURE RESULTS: SIGNIFICANT CHANGE UP
CULTURE RESULTS: SIGNIFICANT CHANGE UP
GLUCOSE SERPL-MCNC: 109 MG/DL — HIGH (ref 70–99)
HCT VFR BLD CALC: 26.1 % — LOW (ref 34.5–45)
HCT VFR BLD CALC: 26.1 % — LOW (ref 34.5–45)
HGB BLD-MCNC: 8.3 G/DL — LOW (ref 11.5–15.5)
HGB BLD-MCNC: 8.5 G/DL — LOW (ref 11.5–15.5)
PHOSPHATE SERPL-MCNC: 8.9 MG/DL — HIGH (ref 2.5–4.5)
POTASSIUM SERPL-MCNC: 5.2 MMOL/L — SIGNIFICANT CHANGE UP (ref 3.5–5.3)
POTASSIUM SERPL-SCNC: 5.2 MMOL/L — SIGNIFICANT CHANGE UP (ref 3.5–5.3)
SODIUM SERPL-SCNC: 135 MMOL/L — SIGNIFICANT CHANGE UP (ref 135–145)
SPECIMEN SOURCE: SIGNIFICANT CHANGE UP
SPECIMEN SOURCE: SIGNIFICANT CHANGE UP

## 2021-10-20 PROCEDURE — 99232 SBSQ HOSP IP/OBS MODERATE 35: CPT

## 2021-10-20 PROCEDURE — 99497 ADVNCD CARE PLAN 30 MIN: CPT

## 2021-10-20 RX ORDER — FLUCONAZOLE 150 MG/1
150 TABLET ORAL ONCE
Refills: 0 | Status: COMPLETED | OUTPATIENT
Start: 2021-10-20 | End: 2021-10-20

## 2021-10-20 RX ORDER — CALCIUM ACETATE 667 MG
667 TABLET ORAL
Refills: 0 | Status: DISCONTINUED | OUTPATIENT
Start: 2021-10-20 | End: 2021-10-25

## 2021-10-20 RX ADMIN — OXYCODONE AND ACETAMINOPHEN 2 TABLET(S): 5; 325 TABLET ORAL at 09:50

## 2021-10-20 RX ADMIN — FLUCONAZOLE 150 MILLIGRAM(S): 150 TABLET ORAL at 17:09

## 2021-10-20 RX ADMIN — Medication 60 MILLIGRAM(S): at 09:02

## 2021-10-20 RX ADMIN — OXYCODONE AND ACETAMINOPHEN 2 TABLET(S): 5; 325 TABLET ORAL at 09:00

## 2021-10-20 RX ADMIN — PANTOPRAZOLE SODIUM 40 MILLIGRAM(S): 20 TABLET, DELAYED RELEASE ORAL at 05:26

## 2021-10-20 RX ADMIN — GABAPENTIN 100 MILLIGRAM(S): 400 CAPSULE ORAL at 05:26

## 2021-10-20 RX ADMIN — AMLODIPINE BESYLATE 10 MILLIGRAM(S): 2.5 TABLET ORAL at 09:00

## 2021-10-20 RX ADMIN — OXYCODONE AND ACETAMINOPHEN 2 TABLET(S): 5; 325 TABLET ORAL at 21:47

## 2021-10-20 RX ADMIN — ALBUTEROL 2 PUFF(S): 90 AEROSOL, METERED ORAL at 14:08

## 2021-10-20 RX ADMIN — OXYCODONE AND ACETAMINOPHEN 2 TABLET(S): 5; 325 TABLET ORAL at 16:00

## 2021-10-20 RX ADMIN — GABAPENTIN 100 MILLIGRAM(S): 400 CAPSULE ORAL at 21:47

## 2021-10-20 RX ADMIN — OXYCODONE AND ACETAMINOPHEN 2 TABLET(S): 5; 325 TABLET ORAL at 02:55

## 2021-10-20 RX ADMIN — Medication 200 MILLIGRAM(S): at 09:01

## 2021-10-20 RX ADMIN — Medication 0.1 MILLIGRAM(S): at 16:27

## 2021-10-20 RX ADMIN — GABAPENTIN 100 MILLIGRAM(S): 400 CAPSULE ORAL at 13:06

## 2021-10-20 RX ADMIN — OXYCODONE AND ACETAMINOPHEN 2 TABLET(S): 5; 325 TABLET ORAL at 15:25

## 2021-10-20 RX ADMIN — ALBUTEROL 2 PUFF(S): 90 AEROSOL, METERED ORAL at 08:35

## 2021-10-20 RX ADMIN — OXYCODONE AND ACETAMINOPHEN 2 TABLET(S): 5; 325 TABLET ORAL at 21:49

## 2021-10-20 RX ADMIN — Medication 1 APPLICATION(S): at 09:00

## 2021-10-20 NOTE — PROGRESS NOTE ADULT - ASSESSMENT
Assessment/Plan    42 y/o woman with hx of IVDA, MRSA, ESBL,  RA and SLE w/ Rheum, Dr. Del Rosario coming in w/ high Creat w/ Proteinuria      ARF:  -Protienuria > 7gm w/ Hematuria w/ low C3/C4 w/ UTI.  -cr 6.47  not improving; patient is however making a lot of urine.   -s/p renal biopsy 10/18:   -DSDNA elevated, SSA, SSB+, complements low.  -Rhemuatology considering started steroids for Lupus flare up.   -continue HCQ 200mg daily  - worsening renal function, might need HD as per Dr. Pena.     #Reported Heroine abuse  -Patient denies any recent drug use despite positive UDS  -UDS positive for cocaine and marijuana      #UTI  Urine c/s  with >100K Enterobacter  completed rocephin    #LT knee draining boil. Stable  H/o MRSA before  completed Doxycycline    #Anemia likely chronic due to chronic disease.   s/p 2 units of pRBCs  No overt signs of bleediing  Monitor HH    #Thrombocytopenia- likely chronic  Monitor counts    #HTN- cont current meds    #DVT proph- venodynes    Dispo: monitor renal function  f/u renal Bx    GOC and advance care planning meeting done with the patient. She wishes to be fully resuscitated and mechanically ventilated if need arises. There are no limitations of any sort in her medical care and management. She is FULL CODE. Additional time spent 21 min.

## 2021-10-20 NOTE — CHART NOTE - NSCHARTNOTEFT_GEN_A_CORE
IR called yesterday regarding hematuria s/p renal biopsy. Recommendation at that time was for serial H/H and monitoring. Followed up with pt's nurse today. They report no more further hematuria. Please call IR with any future concerns.

## 2021-10-20 NOTE — PROGRESS NOTE ADULT - SUBJECTIVE AND OBJECTIVE BOX
Patient is a 41y Female who reports no complaints as new. Intake fair//limited.         REVIEW OF SYSTEMS:    CONSTITUTIONAL: stable weakness, fevers or chills  RESPIRATORY: No cough, wheezing, hemoptysis; stable shortness of breath  CARDIOVASCULAR: No chest pain or palpitations  GENITOURINARY: No dysuria, frequency or hematuria  All other review of systems is negative unless indicated above.    MEDICATIONS  (STANDING):  ALBUTerol    90 MICROgram(s) HFA Inhaler 2 Puff(s) Inhalation every 6 hours  amLODIPine   Tablet 10 milliGRAM(s) Oral daily  cloNIDine 0.1 milliGRAM(s) Oral daily  collagenase Ointment 1 Application(s) Topical daily  gabapentin 100 milliGRAM(s) Oral three times a day  hydroxychloroquine 200 milliGRAM(s) Oral daily  influenza   Vaccine 0.5 milliLiter(s) IntraMuscular once  methylPREDNISolone sodium succinate Injectable 60 milliGRAM(s) IV Push daily  pantoprazole    Tablet 40 milliGRAM(s) Oral before breakfast    MEDICATIONS  (PRN):  acetaminophen   Tablet .. 650 milliGRAM(s) Oral every 6 hours PRN Temp greater or equal to 38C (100.4F), Mild Pain (1 - 3)  ALBUTerol    90 MICROgram(s) HFA Inhaler 1 Puff(s) Inhalation every 6 hours PRN SOB/Wheezing/Congestion  diphenhydrAMINE 25 milliGRAM(s) Oral every 4 hours PRN Rash and/or Itching  loperamide 2 milliGRAM(s) Oral three times a day PRN Diarrhea  melatonin 3 milliGRAM(s) Oral at bedtime PRN Insomnia  ondansetron Injectable 4 milliGRAM(s) IV Push every 8 hours PRN Nausea and/or Vomiting  oxycodone    5 mG/acetaminophen 325 mG 2 Tablet(s) Oral every 6 hours PRN Severe Pain (7 - 10)      Vital Signs Last 24 Hrs  T(C): 36.8 (20 Oct 2021 15:47), Max: 36.8 (20 Oct 2021 15:47)  T(F): 98.3 (20 Oct 2021 15:47), Max: 98.3 (20 Oct 2021 15:47)  HR: 107 (20 Oct 2021 17:53) (98 - 115)  BP: 129/85 (20 Oct 2021 17:53) (127/94 - 150/111)  BP(mean): --  RR: 17 (20 Oct 2021 15:47) (17 - 18)  SpO2: 100% (20 Oct 2021 15:47) (97% - 100%)    I&O's Detail            PHYSICAL EXAM:    Constitutional: frail, >>then stated age  HEENT: tevin MM, poor dentition  Neck: No LAD, No JVD  Respiratory: dist  Cardiovascular: S1 and S2  Extremities:  peripheral edema  Neurological: Alert          LABS:               135    |  104    |  89     ----------------------------<  109       20 Oct 2021 11:33  5.2     |  20     |  7.01                         8.5    x     )-----------( x        ( 20 Oct 2021 11:33 )             26.1                         8.3    x     )-----------( x        ( 20 Oct 2021 08:59 )             26.1     139    |  107    |  75     ----------------------------<  88        19 Oct 2021 05:18  4.7     |  23     |  6.47     138    |  105    |  77     ----------------------------<  87        18 Oct 2021 09:10  4.8     |  24     |  6.18     Ca    7.9        20 Oct 2021 11:33  Ca    7.7        19a Oct 2021 05:18    Phos  8.9       20 Oct 2021 11:33      TPro  6.3    /  Alb  1.9    /  TBili  0.3    /        17 Oct 2021 08:44  DBili  <0.1   /  AST  33     /  ALT  22     /  AlkPhos  110            Urine Studies:          RADIOLOGY & ADDITIONAL STUDIES:

## 2021-10-20 NOTE — CHART NOTE - NSCHARTNOTEFT_GEN_A_CORE
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   She has both +P-ANCA, +DSDNA antibodies accompanied by low complements.  She has been treated with IV antibiotics for UTI and decubitus ulcer.  Most recent blood cultures are negative.  Discussed case with Dr. Pena. Giving consideration to the goal of preserving renal function and in light of recent infection, will initiate lower dose pulse steroid therapy at 250mg IV of   methylprednisolone. Will plan to continue for total of 3 days, but this is subject to change depending on clinical course.

## 2021-10-20 NOTE — PROGRESS NOTE ADULT - ASSESSMENT
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     IV steroids for now but high risk for infection complications with immunosuppressants use plus with above path the renal prognosis is poor for recovery   check antihistone Ab  pANCA titers elevated - MPO/PR3 titers pending         seen this AM note now   case d.w Dr Gil and Dr Quintana

## 2021-10-20 NOTE — PROGRESS NOTE ADULT - SUBJECTIVE AND OBJECTIVE BOX
Cheif complaints and Diagnosis: ARF/ Lupus/ IVDA    Subjective: no new complaints  worsening BUN/Cr; 89/7.01  H/H stable      REVIEW OF SYSTEMS:    CONSTITUTIONAL: No weakness, fevers or chills  EYES/ENT: No visual changes;  No vertigo or throat pain   NECK: No pain or stiffness  RESPIRATORY: No cough, wheezing, hemoptysis; No shortness of breath  CARDIOVASCULAR: No chest pain or palpitations  GASTROINTESTINAL: No abdominal or epigastric pain. No nausea, vomiting, or hematemesis; No diarrhea or constipation. No melena or hematochezia.  GENITOURINARY: No dysuria, frequency or hematuria  NEUROLOGICAL: No numbness or weakness  SKIN: No itching, burning, rashes, or lesions   All other review of systems is negative unless indicated above      PHYSICAL EXAM:    Daily     Daily Weight in k (20 Oct 2021 06:10)     Vital Signs Last 24 Hrs  T(C): 36.4 (20 Oct 2021 07:51), Max: 36.7 (19 Oct 2021 16:10)  T(F): 97.6 (20 Oct 2021 07:51), Max: 98.1 (19 Oct 2021 16:10)  HR: 106 (20 Oct 2021 08:38) (103 - 117)  BP: 139/91 (20 Oct 2021 07:51) (127/94 - 139/91)  BP(mean): --  ABP: --  ABP(mean): --  RR: 18 (20 Oct 2021 07:51) (18 - 18)  SpO2: 97% (20 Oct 2021 07:51) (97% - 100%)      Constitutional: Weak appearing  HEENT: Atraumatic, ENE, Normal, No congestion  Respiratory: Breath Sounds normal, no rhonchi/wheeze  Cardiovascular: N S1S2;   Gastrointestinal: Abdomen soft, non tender, Bowel Sounds present  Extremities: No edema, peripheral pulses present  Neurological: AAO x 3, no gross focal motor deficits  Skin: Non cellulitic, no rash, ulcers  Lymph Nodes: No lymphadenopathy noted  Back: No CVA tenderness   Musculoskeletal: non tender  Breasts: Deferred  Genitourinary: deferred  Rectal: Deferred    Lab Results:  CBC  CBC Full  -  ( 20 Oct 2021 11:33 )  WBC Count : x  RBC Count : x  Hemoglobin : 8.5 g/dL  Hematocrit : 26.1 %  Platelet Count - Automated : x  Mean Cell Volume : x  Mean Cell Hemoglobin : x  Mean Cell Hemoglobin Concentration : x  Auto Neutrophil # : x  Auto Lymphocyte # : x  Auto Monocyte # : x  Auto Eosinophil # : x  Auto Basophil # : x  Auto Neutrophil % : x  Auto Lymphocyte % : x  Auto Monocyte % : x  Auto Eosinophil % : x  Auto Basophil % : x    .		Differential:	[] Automated		[] Manual  Chemistry                        8.5    x     )-----------( x        ( 20 Oct 2021 11:33 )             26.1     10-20    135  |  104  |  89<H>  ----------------------------<  109<H>  5.2   |  20<L>  |  7.01<H>    Ca    7.9<L>      20 Oct 2021 11:33  Phos  8.9     10-20                  MICROBIOLOGY/CULTURES:  Culture Results:   No growth to date. (10-15 @ 12:02)  Culture Results:   No growth to date. (10-15 @ 12:02)  Culture Results:   GI PCR Results: NOT detected  *******Please Note:*******  GI panel PCR evaluates for:  Campylobacter, Plesiomonas shigelloides, Salmonella,  Vibrio, Yersinia enterocolitica, Enteroaggregative  Escherichia coli (EAEC), Enteropathogenic E.coli (EPEC),  Enterotoxigenic E. coli (ETEC) lt/st, Shiga-like  toxin-producing E. coli (STEC) stx1/stx2,  Shigella/ Enteroinvasive E. coli (EIEC), Cryptosporidium,  Cyclospora cayetanensis, Entamoeba histolytica,  Giardia lamblia, Adenovirus F 40/41, Astrovirus,  Norovirus GI/GII, Rotavirus A, Sapovirus (10-14 @ 12:30)            MEDICATIONS  (STANDING):  ALBUTerol    90 MICROgram(s) HFA Inhaler 2 Puff(s) Inhalation every 6 hours  amLODIPine   Tablet 10 milliGRAM(s) Oral daily  collagenase Ointment 1 Application(s) Topical daily  gabapentin 100 milliGRAM(s) Oral three times a day  hydroxychloroquine 200 milliGRAM(s) Oral daily  influenza   Vaccine 0.5 milliLiter(s) IntraMuscular once  methylPREDNISolone sodium succinate Injectable 60 milliGRAM(s) IV Push daily  pantoprazole    Tablet 40 milliGRAM(s) Oral before breakfast

## 2021-10-21 DIAGNOSIS — N17.9 ACUTE KIDNEY FAILURE, UNSPECIFIED: ICD-10-CM

## 2021-10-21 LAB
ALBUMIN SERPL ELPH-MCNC: 2 G/DL — LOW (ref 3.3–5)
ANION GAP SERPL CALC-SCNC: 13 MMOL/L — SIGNIFICANT CHANGE UP (ref 5–17)
ANION GAP SERPL CALC-SCNC: 13 MMOL/L — SIGNIFICANT CHANGE UP (ref 5–17)
AUTO DIFF PNL BLD: NEGATIVE — SIGNIFICANT CHANGE UP
BUN SERPL-MCNC: 103 MG/DL — HIGH (ref 7–23)
BUN SERPL-MCNC: 99 MG/DL — HIGH (ref 7–23)
C-ANCA SER-ACNC: NEGATIVE — SIGNIFICANT CHANGE UP
CALCIUM SERPL-MCNC: 7.5 MG/DL — LOW (ref 8.5–10.1)
CALCIUM SERPL-MCNC: 7.5 MG/DL — LOW (ref 8.5–10.1)
CHLORIDE SERPL-SCNC: 103 MMOL/L — SIGNIFICANT CHANGE UP (ref 96–108)
CHLORIDE SERPL-SCNC: 104 MMOL/L — SIGNIFICANT CHANGE UP (ref 96–108)
CO2 SERPL-SCNC: 17 MMOL/L — LOW (ref 22–31)
CO2 SERPL-SCNC: 17 MMOL/L — LOW (ref 22–31)
CREAT SERPL-MCNC: 7.55 MG/DL — HIGH (ref 0.5–1.3)
CREAT SERPL-MCNC: 7.59 MG/DL — HIGH (ref 0.5–1.3)
GLUCOSE SERPL-MCNC: 139 MG/DL — HIGH (ref 70–99)
GLUCOSE SERPL-MCNC: 172 MG/DL — HIGH (ref 70–99)
HCT VFR BLD CALC: 25 % — LOW (ref 34.5–45)
HCT VFR BLD CALC: 25.6 % — LOW (ref 34.5–45)
HCT VFR BLD CALC: 27.4 % — LOW (ref 34.5–45)
HGB BLD-MCNC: 7.7 G/DL — LOW (ref 11.5–15.5)
HGB BLD-MCNC: 8 G/DL — LOW (ref 11.5–15.5)
HGB BLD-MCNC: 8.8 G/DL — LOW (ref 11.5–15.5)
MCHC RBC-ENTMCNC: 27.3 PG — SIGNIFICANT CHANGE UP (ref 27–34)
MCHC RBC-ENTMCNC: 31.3 GM/DL — LOW (ref 32–36)
MCV RBC AUTO: 87.4 FL — SIGNIFICANT CHANGE UP (ref 80–100)
MYELOPEROXIDASE AB SER-ACNC: 50.3 UNITS — HIGH
MYELOPEROXIDASE CELLS FLD QL: POSITIVE
O2 CT VFR BLD CALC: ABNORMAL
P-ANCA SER-ACNC: POSITIVE
PHOSPHATE SERPL-MCNC: 9.3 MG/DL — HIGH (ref 2.5–4.5)
PLATELET # BLD AUTO: 235 K/UL — SIGNIFICANT CHANGE UP (ref 150–400)
POTASSIUM SERPL-MCNC: 4.8 MMOL/L — SIGNIFICANT CHANGE UP (ref 3.5–5.3)
POTASSIUM SERPL-MCNC: 4.9 MMOL/L — SIGNIFICANT CHANGE UP (ref 3.5–5.3)
POTASSIUM SERPL-SCNC: 4.8 MMOL/L — SIGNIFICANT CHANGE UP (ref 3.5–5.3)
POTASSIUM SERPL-SCNC: 4.9 MMOL/L — SIGNIFICANT CHANGE UP (ref 3.5–5.3)
PROTEINASE3 AB FLD-ACNC: 16.6 UNITS — SIGNIFICANT CHANGE UP
PROTEINASE3 AB SER-ACNC: NEGATIVE — SIGNIFICANT CHANGE UP
RBC # BLD: 2.93 M/UL — LOW (ref 3.8–5.2)
RBC # FLD: 14.7 % — HIGH (ref 10.3–14.5)
SODIUM SERPL-SCNC: 133 MMOL/L — LOW (ref 135–145)
SODIUM SERPL-SCNC: 134 MMOL/L — LOW (ref 135–145)
WBC # BLD: 8.91 K/UL — SIGNIFICANT CHANGE UP (ref 3.8–10.5)
WBC # FLD AUTO: 8.91 K/UL — SIGNIFICANT CHANGE UP (ref 3.8–10.5)

## 2021-10-21 PROCEDURE — 99232 SBSQ HOSP IP/OBS MODERATE 35: CPT

## 2021-10-21 RX ADMIN — ALBUTEROL 2 PUFF(S): 90 AEROSOL, METERED ORAL at 21:20

## 2021-10-21 RX ADMIN — Medication 667 MILLIGRAM(S): at 17:40

## 2021-10-21 RX ADMIN — OXYCODONE AND ACETAMINOPHEN 2 TABLET(S): 5; 325 TABLET ORAL at 23:42

## 2021-10-21 RX ADMIN — Medication 2 MILLIGRAM(S): at 09:01

## 2021-10-21 RX ADMIN — Medication 2 MILLIGRAM(S): at 20:36

## 2021-10-21 RX ADMIN — ALBUTEROL 2 PUFF(S): 90 AEROSOL, METERED ORAL at 08:56

## 2021-10-21 RX ADMIN — Medication 1 APPLICATION(S): at 14:46

## 2021-10-21 RX ADMIN — Medication 1 DROP(S): at 20:36

## 2021-10-21 RX ADMIN — Medication 200 MILLIGRAM(S): at 09:15

## 2021-10-21 RX ADMIN — Medication 3 MILLIGRAM(S): at 23:42

## 2021-10-21 RX ADMIN — Medication 667 MILLIGRAM(S): at 09:02

## 2021-10-21 RX ADMIN — GABAPENTIN 100 MILLIGRAM(S): 400 CAPSULE ORAL at 06:17

## 2021-10-21 RX ADMIN — OXYCODONE AND ACETAMINOPHEN 2 TABLET(S): 5; 325 TABLET ORAL at 04:17

## 2021-10-21 RX ADMIN — OXYCODONE AND ACETAMINOPHEN 2 TABLET(S): 5; 325 TABLET ORAL at 10:59

## 2021-10-21 RX ADMIN — OXYCODONE AND ACETAMINOPHEN 2 TABLET(S): 5; 325 TABLET ORAL at 11:47

## 2021-10-21 RX ADMIN — GABAPENTIN 100 MILLIGRAM(S): 400 CAPSULE ORAL at 13:14

## 2021-10-21 RX ADMIN — Medication 0.1 MILLIGRAM(S): at 09:16

## 2021-10-21 RX ADMIN — GABAPENTIN 100 MILLIGRAM(S): 400 CAPSULE ORAL at 23:42

## 2021-10-21 RX ADMIN — AMLODIPINE BESYLATE 10 MILLIGRAM(S): 2.5 TABLET ORAL at 09:16

## 2021-10-21 RX ADMIN — PANTOPRAZOLE SODIUM 40 MILLIGRAM(S): 20 TABLET, DELAYED RELEASE ORAL at 06:17

## 2021-10-21 RX ADMIN — Medication 667 MILLIGRAM(S): at 12:42

## 2021-10-21 RX ADMIN — OXYCODONE AND ACETAMINOPHEN 2 TABLET(S): 5; 325 TABLET ORAL at 17:39

## 2021-10-21 NOTE — PROGRESS NOTE ADULT - ASSESSMENT
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   Cr now rising despite iv steroids  pt advised on rising Creatinine w borderline K and worsening acidosis - advised pt and agreeable if cr continues to rise   likely plan for HD based on early  AM labs    d/w IR for arianne tavarez tomorrow     await final MPO/PR3 titers   await antihistone Ab's    IV steroids for now but high risk for infection complications with immunosuppressants use plus with above pathology the renal prognosis is very unlikely for recovery - agree with Solumedrol 250 mg IV daily x 3 for trial to asses response      seen this AM note now     case d.w Dr Gil     39 female with anemia, HTN, RA, SLE was told to go to ED for evaluation of elevated creatinine from rheum office after long absence of followup       NATALIA   renal path : 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   Cr now rising despite iv steroids  pt advised on rising Creatinine w borderline K and worsening acidosis - advised pt and agreeable to proceed with plan for HD based on early  AM labs    d/w IR for possible placement of temp HD catheter      await final MPO/PR3 titers   await antihistone Ab's    IV steroids for now but high risk for infection complications with immunosuppressants use plus with above pathology the renal prognosis is very unlikely for recovery - agree with Solumedrol 250 mg IV daily x 3 for trial to asses response      seen this AM note now     case d.w Dr Gil

## 2021-10-21 NOTE — PROGRESS NOTE ADULT - SUBJECTIVE AND OBJECTIVE BOX
Patient is a 41y Female who reports no complaints as new. Intake fair//limited.   anxious      REVIEW OF SYSTEMS:    CONSTITUTIONAL: stable weakness, fevers or chills  RESPIRATORY: No cough, wheezing, hemoptysis; stable shortness of breath  CARDIOVASCULAR: No chest pain or palpitations  GENITOURINARY: No dysuria, frequency or hematuria  All other review of systems is negative unless indicated above.    MEDICATIONS  (STANDING):  ALBUTerol    90 MICROgram(s) HFA Inhaler 2 Puff(s) Inhalation every 6 hours  amLODIPine   Tablet 10 milliGRAM(s) Oral daily  artificial  tears Solution 1 Drop(s) Both EYES two times a day  calcium acetate 667 milliGRAM(s) Oral three times a day with meals  cloNIDine 0.1 milliGRAM(s) Oral daily  collagenase Ointment 1 Application(s) Topical daily  gabapentin 100 milliGRAM(s) Oral three times a day  hydroxychloroquine 200 milliGRAM(s) Oral daily  influenza   Vaccine 0.5 milliLiter(s) IntraMuscular once  methylPREDNISolone sodium succinate IVPB 250 milliGRAM(s) IV Intermittent daily  pantoprazole    Tablet 40 milliGRAM(s) Oral before breakfast      Vital Signs Last 24 Hrs  T(C): 36.4 (21 Oct 2021 16:05), Max: 36.4 (21 Oct 2021 16:05)  T(F): 97.6 (21 Oct 2021 16:05), Max: 97.6 (21 Oct 2021 16:05)  HR: 96 (21 Oct 2021 16:05) (86 - 97)  BP: 132/92 (21 Oct 2021 16:05) (126/88 - 132/92)  BP(mean): --  RR: 18 (21 Oct 2021 16:05) (17 - 18)  SpO2: 100% (21 Oct 2021 16:05) (98% - 100%)    I&O's Detail          PHYSICAL EXAM:    Constitutional: frail, >>then stated age  HEENT: tevin MM, poor dentition  Neck: No LAD, No JVD  Respiratory: dist  Cardiovascular: S1 and S2  Extremities:  peripheral edema  Neurological: Alert  + edema        LABS:      134    |  104    |  99     ----------------------------<  172       21 Oct 2021 09:33  4.8     |  17     |  7.59     135    |  104    |  89     ----------------------------<  109       20 Oct 2021 11:33  5.2     |  20     |  7.01     Ca    7.5        21 Oct 2021 20:27  Ca    7.5        21 Oct 2021 09:33    Phos  9.3       21 Oct 2021 09:33  Phos  8.9       20 Oct 2021 11:33      TPro  x      /  Alb  2.0    /  TBili  x      /        21 Oct 2021 09:33  DBili  x      /  AST  x      /  ALT  x      /  AlkPhos  x                                    8.8    x     )-----------( x        ( 21 Oct 2021 12:41 )             27.4     Urine Studies:          RADIOLOGY & ADDITIONAL STUDIES:

## 2021-10-21 NOTE — PROGRESS NOTE ADULT - ASSESSMENT
40 y/o woman with hx of RA and SLE w/ Rheum, Dr. Del Rosario coming in w/ high Creat w/ Proteinuria > 7gm w/ Hematuria w/ low C3/C4 w/ UTI.    -DSDNA elevated, SSA, SSB+, complements low, +P-ANCA.  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  -  Discussed case with Dr. Pena. Giving consideration to the goal of preserving renal function and in light of recent infection, lowerr dose pulse steroid therapy at 250mg IV of  methylprednisolone was initiated 10/20/21.  This evening will be day 2.  Will plan to continue for total of 3 days, but this is subject to change depending on clinical course.  -Monitor glucose, maintain PPI while on steroid  -continue HCQ 200mg daily, closer to wt based   will follow

## 2021-10-21 NOTE — PROGRESS NOTE ADULT - SUBJECTIVE AND OBJECTIVE BOX
Cheif complaints and Diagnosis: ARF/ Lupus/ IVDA    Subjective:   no new complaints  worsening BUN/Cr;   H/H stable  c/o dry eyes    REVIEW OF SYSTEMS:    CONSTITUTIONAL: No weakness, fevers or chills  EYES/ENT: No visual changes;  No vertigo or throat pain   NECK: No pain or stiffness  RESPIRATORY: No cough, wheezing, hemoptysis; No shortness of breath  CARDIOVASCULAR: No chest pain or palpitations  GASTROINTESTINAL: No abdominal or epigastric pain. No nausea, vomiting, or hematemesis; No diarrhea or constipation. No melena or hematochezia.  GENITOURINARY: No dysuria, frequency or hematuria  NEUROLOGICAL: No numbness or weakness  SKIN: No itching, burning, rashes, or lesions   All other review of systems is negative unless indicated above      PHYSICAL EXAM:    Vital Signs Last 24 Hrs  T(C): 35.8 (21 Oct 2021 08:52), Max: 36.8 (20 Oct 2021 15:47)  T(F): 96.5 (21 Oct 2021 08:52), Max: 98.3 (20 Oct 2021 15:47)  HR: 86 (21 Oct 2021 08:52) (86 - 111)  BP: 126/88 (21 Oct 2021 08:52) (126/88 - 150/111)  BP(mean): --  RR: 18 (21 Oct 2021 08:52) (17 - 18)  SpO2: 100% (21 Oct 2021 08:52) (98% - 100%)    Constitutional: Weak appearing  HEENT: Atraumatic, ENE, Normal, No congestion  Respiratory: Breath Sounds normal, no rhonchi/wheeze  Cardiovascular: N S1S2;   Gastrointestinal: Abdomen soft, non tender, Bowel Sounds present  Extremities: No edema, peripheral pulses present  Neurological: AAO x 3, no gross focal motor deficits  Skin: Non cellulitic, no rash, ulcers  Lymph Nodes: No lymphadenopathy noted  Back: No CVA tenderness   Musculoskeletal: non tender  Breasts: Deferred  Genitourinary: deferred  Rectal: Deferred      10-21    134<L>  |  104  |  99<H>  ----------------------------<  172<H>  4.8   |  17<L>  |  7.59<H>    Ca    7.5<L>      21 Oct 2021 09:33  Phos  9.3     10-21    TPro  x   /  Alb  2.0<L>  /  TBili  x   /  DBili  x   /  AST  x   /  ALT  x   /  AlkPhos  x   10-21    Lab Results:  CBC  CBC Full  -  ( 20 Oct 2021 11:33 )  WBC Count : x  RBC Count : x  Hemoglobin : 8.5 g/dL  Hematocrit : 26.1 %  Platelet Count - Automated : x  Mean Cell Volume : x  Mean Cell Hemoglobin : x  Mean Cell Hemoglobin Concentration : x  Auto Neutrophil # : x  Auto Lymphocyte # : x  Auto Monocyte # : x  Auto Eosinophil # : x  Auto Basophil # : x  Auto Neutrophil % : x  Auto Lymphocyte % : x  Auto Monocyte % : x  Auto Eosinophil % : x  Auto Basophil % : x                          8.8    x     )-----------( x        ( 21 Oct 2021 12:41 )             27.4       .		Differential:	[] Automated		[] Manual  Chemistry                        8.5    x     )-----------( x        ( 20 Oct 2021 11:33 )             26.1     10-20    135  |  104  |  89<H>  ----------------------------<  109<H>  5.2   |  20<L>  |  7.01<H>    Ca    7.9<L>      20 Oct 2021 11:33  Phos  8.9     10-20                  MICROBIOLOGY/CULTURES:  Culture Results:   No growth to date. (10-15 @ 12:02)  Culture Results:   No growth to date. (10-15 @ 12:02)  Culture Results:   GI PCR Results: NOT detected  *******Please Note:*******  GI panel PCR evaluates for:  Campylobacter, Plesiomonas shigelloides, Salmonella,  Vibrio, Yersinia enterocolitica, Enteroaggregative  Escherichia coli (EAEC), Enteropathogenic E.coli (EPEC),  Enterotoxigenic E. coli (ETEC) lt/st, Shiga-like  toxin-producing E. coli (STEC) stx1/stx2,  Shigella/ Enteroinvasive E. coli (EIEC), Cryptosporidium,  Cyclospora cayetanensis, Entamoeba histolytica,  Giardia lamblia, Adenovirus F 40/41, Astrovirus,  Norovirus GI/GII, Rotavirus A, Sapovirus (10-14 @ 12:30)        MEDICATIONS  (STANDING):  ALBUTerol    90 MICROgram(s) HFA Inhaler 2 Puff(s) Inhalation every 6 hours  amLODIPine   Tablet 10 milliGRAM(s) Oral daily  artificial  tears Solution 1 Drop(s) Both EYES two times a day  calcium acetate 667 milliGRAM(s) Oral three times a day with meals  cloNIDine 0.1 milliGRAM(s) Oral daily  collagenase Ointment 1 Application(s) Topical daily  gabapentin 100 milliGRAM(s) Oral three times a day  hydroxychloroquine 200 milliGRAM(s) Oral daily  influenza   Vaccine 0.5 milliLiter(s) IntraMuscular once  methylPREDNISolone sodium succinate IVPB 250 milliGRAM(s) IV Intermittent daily  pantoprazole    Tablet 40 milliGRAM(s) Oral before breakfast    MEDICATIONS  (PRN):  acetaminophen   Tablet .. 650 milliGRAM(s) Oral every 6 hours PRN Temp greater or equal to 38C (100.4F), Mild Pain (1 - 3)  ALBUTerol    90 MICROgram(s) HFA Inhaler 1 Puff(s) Inhalation every 6 hours PRN SOB/Wheezing/Congestion  diphenhydrAMINE 25 milliGRAM(s) Oral every 4 hours PRN Rash and/or Itching  loperamide 2 milliGRAM(s) Oral three times a day PRN Diarrhea  melatonin 3 milliGRAM(s) Oral at bedtime PRN Insomnia  ondansetron Injectable 4 milliGRAM(s) IV Push every 8 hours PRN Nausea and/or Vomiting  oxycodone    5 mG/acetaminophen 325 mG 2 Tablet(s) Oral every 6 hours PRN Severe Pain (7 - 10)

## 2021-10-21 NOTE — PROGRESS NOTE ADULT - SUBJECTIVE AND OBJECTIVE BOX
CHIEF COMPLAINT:  Sent for elevated Cr    SUBJECTIVE:    Denies any new complaints.  Tolerating high dose steroid.  Denies fevers, chills.  Hematuria has resolved.      REVIEW OF SYSTEMS:  negative except as above.        Vital Signs Last 24 Hrs  T(C): 35.8 (21 Oct 2021 08:52), Max: 36.8 (20 Oct 2021 15:47)  T(F): 96.5 (21 Oct 2021 08:52), Max: 98.3 (20 Oct 2021 15:47)  HR: 86 (21 Oct 2021 08:52) (86 - 111)  BP: 126/88 (21 Oct 2021 08:52) (126/88 - 150/111)  BP(mean): --  RR: 18 (21 Oct 2021 08:52) (17 - 18)  SpO2: 100% (21 Oct 2021 08:52) (98% - 100%)    I&O's Summary      CAPILLARY BLOOD GLUCOSE          PHYSICAL EXAM:    Constitutional: NAD, awake and alert, well-developed  HEENT- no oral lesions noted, no lymphadenoapthy noted  Heart- S1 S2 reg  Lungs- CTA b/l  Abd- Soft NT  Ext- no edema  Skin- Has two bandages, one from sacral decub which was examined, dry, appears healed.    Musculoskelatal- FROM all joints, no active synovitis noted  Neurological- intact strength upper and lower extremities      MEDICATIONS:  MEDICATIONS  (STANDING):  ALBUTerol    90 MICROgram(s) HFA Inhaler 2 Puff(s) Inhalation every 6 hours  amLODIPine   Tablet 10 milliGRAM(s) Oral daily  calcium acetate 667 milliGRAM(s) Oral three times a day with meals  cloNIDine 0.1 milliGRAM(s) Oral daily  collagenase Ointment 1 Application(s) Topical daily  gabapentin 100 milliGRAM(s) Oral three times a day  hydroxychloroquine 200 milliGRAM(s) Oral daily  influenza   Vaccine 0.5 milliLiter(s) IntraMuscular once  methylPREDNISolone sodium succinate IVPB 250 milliGRAM(s) IV Intermittent daily  pantoprazole    Tablet 40 milliGRAM(s) Oral before breakfast      LABS: All Labs Reviewed:                        8.5    x     )-----------( x        ( 20 Oct 2021 11:33 )             26.1     10-20    135  |  104  |  89<H>  ----------------------------<  109<H>  5.2   |  20<L>  |  7.01<H>    Ca    7.9<L>      20 Oct 2021 11:33  Phos  8.9     10-20            Blood Culture:     RADIOLOGY/EKG:    A/P:

## 2021-10-21 NOTE — CHART NOTE - NSCHARTNOTEFT_GEN_A_CORE
Clinical Nutrition Follow Up Note:    Consult for renal education    * 41 year old admitted for NATALIA. pmhx anemia, HTN, RA, SLE on hydroxychloroquine was told to go to ED for evaluation of elevated creatinine. Pt w/ severe crescentic GN with underlying sclerosis and fibrosis associated with tubular necrosis and interstitial inflammation. Pt states plan to initiate HD tomorrow.       *Labs Reviewed:  10-21    134<L>  |  104  |  99<H>  ----------------------------<  172<H>  4.8   |  17<L>  |  7.59<H>    Ca    7.5<L>      21 Oct 2021 09:33  Phos  9.3     10-21    TPro  x   /  Alb  2.0<L>  /  TBili  x   /  DBili  x   /  AST  x   /  ALT  x   /  AlkPhos  x   10-21      BMI: BMI (kg/m2): 18.3 (10-11-21 @ 21:06)  HbA1c:   Glucose:   BP: 126/88 (10-21-21 @ 08:52) (122/77 - 150/111)  Lipid Panel: Date/Time: 10-12-21 @ 09:54  Cholesterol, Serum: 137  Direct LDL: --  HDL Cholesterol, Serum: 14  Total Cholesterol/HDL Ration Measurement: --  Triglycerides, Serum: 470      *pertinent meds: amlodipine, gabapentin, methylprednisolone, pantoprazole, acetaminophen, loperamide, melatonin, zofran, oxycodone      *I and O's: None documented      *(+) BM on 10/18, diarrhea; pt states was given imodium to resolve diarrhea.    *Scot score of 20 : Unstageable/ stage 2 sacrum PU documented - ? accuracy of stage according to flow sheets.  2+ generalized edema documented.    *PO intake, meeting ~ % of estimated nutr needs. Pt states eating well with good appetite. Pt on renal diet, NPO after midnight.    *Malnutrition dx: Pt continues to meet criteria for severe protein-calorie malnutrition in context of chronic disease r/t decreased ability to meet increased nutrient needs 2/2 lupus/ COPD due to heroin abuse/ GERD/ depression AEB moderate to severe muscle and fat wasting, moderate edema, PU, BMI 18.3.      Diet, Renal Restrictions:   For patients receiving Renal Replacement - No Protein Restr, No Conc K, No Conc Phos, Low Sodium (10-19-21 @ 17:12) [Active]  Diet, NPO after Midnight:      NPO Start Date: 17-Oct-2021,   NPO Start Time: 23:59 (10-17-21 @ 11:18) [Active]      Estimated Needs: Based on 50 Kg (admit wt)  Calories: 1250- 1750 Kcal (25-35 Kcal/Kg)  Protein: 60- 90 g (1.2-1.8 g/Kg)  Fluids: 1250- 1500 mL (25- 30 mL/Kg)    *Wt Hx:  Wt skewed by fluid retention. Unable to obtain new wt pt, OOB.  Height (cm): 165.1 (10-11-21 @ 21:06)  Weight (kg): 49.9 (10-11-21 @ 21:06)  BMI (kg/m2): 18.3 (10-11-21 @ 21:06)  BSA (m2): 1.53 (10-11-21 @ 21:06)    Recommendations:  1. Continue renal restricted diet for HD patients  2. Add Nepro TID  3. Add Nephrovite daily to meet 100% RDA  4. Monitor bowel movements, if no BM for >3 days, consider implementing bowel regimen.     Pt receptive to renal diet education; asked a lot of questions. RD answered all questions. Was given [facility] Overview of Renal Diet for HD handout.    RD will continue to monitor PO intake, labs, hydration, and wt prn.     Leigh Kendrick, MS, RDN, [phone]

## 2021-10-21 NOTE — PROGRESS NOTE ADULT - ASSESSMENT
Assessment/Plan    42 y/o woman with hx of IVDA, MRSA, ESBL,  RA and SLE w/ Rheum, Dr. Del Rosario coming in w/ high Creat w/ Proteinuria      ARF:  -Protienuria > 7gm w/ Hematuria w/ low C3/C4 w/ UTI.  -cr 6.47  not improving; patient is however making a lot of urine.   -s/p renal biopsy 10/18: Severe crescentic GN,   -DSDNA elevated, SSA, SSB+, complements low.  -Rhemuatology appreciated; pulse dose steroid  -continue HCQ 200mg daily  - worsening renal function, might need HD as per Dr. Pena. from 10/22    #Reported Heroine abuse  -Patient denies any recent drug use despite positive UDS  -UDS positive for cocaine and marijuana      #UTI  Urine c/s  with >100K Enterobacter  completed rocephin    #LT knee draining boil. Stable  H/o MRSA before  completed Doxycycline    #Anemia likely chronic due to chronic disease.   s/p 2 units of pRBCs  No overt signs of bleediing  Monitor HH    #Thrombocytopenia- likely chronic  Monitor counts    # Dry Eyes: artificial tears    #HTN- cont current meds    #DVT proph- venodynes    Dispo: monitor renal function  f/u renal Bx    GOC and advance care planning meeting done with the patient. She wishes to be fully resuscitated and mechanically ventilated if need arises. There are no limitations of any sort in her medical care and management. She is FULL CODE.

## 2021-10-22 LAB
ANION GAP SERPL CALC-SCNC: 12 MMOL/L — SIGNIFICANT CHANGE UP (ref 5–17)
BUN SERPL-MCNC: 108 MG/DL — HIGH (ref 7–23)
CALCIUM SERPL-MCNC: 7.3 MG/DL — LOW (ref 8.5–10.1)
CHLORIDE SERPL-SCNC: 104 MMOL/L — SIGNIFICANT CHANGE UP (ref 96–108)
CO2 SERPL-SCNC: 19 MMOL/L — LOW (ref 22–31)
CREAT SERPL-MCNC: 7.71 MG/DL — HIGH (ref 0.5–1.3)
GLUCOSE SERPL-MCNC: 132 MG/DL — HIGH (ref 70–99)
HCG UR QL: NEGATIVE — SIGNIFICANT CHANGE UP
HCT VFR BLD CALC: 26.1 % — LOW (ref 34.5–45)
HGB BLD-MCNC: 8.4 G/DL — LOW (ref 11.5–15.5)
HISTONE AB SER-ACNC: 6 UNITS — HIGH (ref 0–0.9)
MCHC RBC-ENTMCNC: 27.6 PG — SIGNIFICANT CHANGE UP (ref 27–34)
MCHC RBC-ENTMCNC: 32.2 GM/DL — SIGNIFICANT CHANGE UP (ref 32–36)
MCV RBC AUTO: 85.9 FL — SIGNIFICANT CHANGE UP (ref 80–100)
PLATELET # BLD AUTO: 205 K/UL — SIGNIFICANT CHANGE UP (ref 150–400)
POTASSIUM SERPL-MCNC: 5.1 MMOL/L — SIGNIFICANT CHANGE UP (ref 3.5–5.3)
POTASSIUM SERPL-SCNC: 5.1 MMOL/L — SIGNIFICANT CHANGE UP (ref 3.5–5.3)
RBC # BLD: 3.04 M/UL — LOW (ref 3.8–5.2)
RBC # FLD: 14.6 % — HIGH (ref 10.3–14.5)
SARS-COV-2 RNA SPEC QL NAA+PROBE: SIGNIFICANT CHANGE UP
SODIUM SERPL-SCNC: 135 MMOL/L — SIGNIFICANT CHANGE UP (ref 135–145)
WBC # BLD: 6.14 K/UL — SIGNIFICANT CHANGE UP (ref 3.8–10.5)
WBC # FLD AUTO: 6.14 K/UL — SIGNIFICANT CHANGE UP (ref 3.8–10.5)

## 2021-10-22 PROCEDURE — 99232 SBSQ HOSP IP/OBS MODERATE 35: CPT

## 2021-10-22 PROCEDURE — 36556 INSERT NON-TUNNEL CV CATH: CPT

## 2021-10-22 PROCEDURE — 77001 FLUOROGUIDE FOR VEIN DEVICE: CPT | Mod: 26

## 2021-10-22 PROCEDURE — 76937 US GUIDE VASCULAR ACCESS: CPT | Mod: 26

## 2021-10-22 RX ORDER — ACETAMINOPHEN 500 MG
1000 TABLET ORAL ONCE
Refills: 0 | Status: COMPLETED | OUTPATIENT
Start: 2021-10-22 | End: 2021-10-22

## 2021-10-22 RX ORDER — OXYCODONE AND ACETAMINOPHEN 5; 325 MG/1; MG/1
2 TABLET ORAL EVERY 6 HOURS
Refills: 0 | Status: DISCONTINUED | OUTPATIENT
Start: 2021-10-22 | End: 2021-10-27

## 2021-10-22 RX ADMIN — ALBUTEROL 2 PUFF(S): 90 AEROSOL, METERED ORAL at 13:51

## 2021-10-22 RX ADMIN — OXYCODONE AND ACETAMINOPHEN 2 TABLET(S): 5; 325 TABLET ORAL at 10:15

## 2021-10-22 RX ADMIN — Medication 667 MILLIGRAM(S): at 19:20

## 2021-10-22 RX ADMIN — Medication 200 MILLIGRAM(S): at 14:15

## 2021-10-22 RX ADMIN — Medication 3 MILLIGRAM(S): at 23:08

## 2021-10-22 RX ADMIN — Medication 400 MILLIGRAM(S): at 20:39

## 2021-10-22 RX ADMIN — OXYCODONE AND ACETAMINOPHEN 2 TABLET(S): 5; 325 TABLET ORAL at 23:08

## 2021-10-22 RX ADMIN — OXYCODONE AND ACETAMINOPHEN 2 TABLET(S): 5; 325 TABLET ORAL at 23:27

## 2021-10-22 RX ADMIN — Medication 1 DROP(S): at 22:52

## 2021-10-22 RX ADMIN — ALBUTEROL 2 PUFF(S): 90 AEROSOL, METERED ORAL at 07:51

## 2021-10-22 RX ADMIN — Medication 200 MILLIGRAM(S): at 12:01

## 2021-10-22 RX ADMIN — Medication 2 MILLIGRAM(S): at 09:03

## 2021-10-22 RX ADMIN — GABAPENTIN 100 MILLIGRAM(S): 400 CAPSULE ORAL at 14:15

## 2021-10-22 RX ADMIN — Medication 0.1 MILLIGRAM(S): at 14:15

## 2021-10-22 RX ADMIN — Medication 2 MILLIGRAM(S): at 22:52

## 2021-10-22 RX ADMIN — Medication 1000 MILLIGRAM(S): at 21:12

## 2021-10-22 RX ADMIN — OXYCODONE AND ACETAMINOPHEN 2 TABLET(S): 5; 325 TABLET ORAL at 19:21

## 2021-10-22 RX ADMIN — OXYCODONE AND ACETAMINOPHEN 2 TABLET(S): 5; 325 TABLET ORAL at 09:03

## 2021-10-22 RX ADMIN — Medication 1 APPLICATION(S): at 12:02

## 2021-10-22 RX ADMIN — AMLODIPINE BESYLATE 10 MILLIGRAM(S): 2.5 TABLET ORAL at 14:15

## 2021-10-22 RX ADMIN — GABAPENTIN 100 MILLIGRAM(S): 400 CAPSULE ORAL at 22:52

## 2021-10-22 RX ADMIN — Medication 1 DROP(S): at 12:02

## 2021-10-22 RX ADMIN — ALBUTEROL 2 PUFF(S): 90 AEROSOL, METERED ORAL at 20:41

## 2021-10-22 NOTE — PROGRESS NOTE ADULT - ASSESSMENT
Assessment/Plan    42 y/o woman with hx of IVDA, MRSA, ESBL,  RA and SLE w/ Rheum, Dr. Del Rosario coming in w/ high Creat w/ Proteinuria      ARF:  -Protienuria > 7gm w/ Hematuria w/ low C3/C4 w/ UTI.  -cr 6.47  not improving; patient is however making a lot of urine.   -s/p renal biopsy 10/18: Severe crescentic GN,   -DSDNA elevated, SSA, SSB+, complements low.  -Rhemuatology appreciated; pulse dose steroid  -continue HCQ 200mg daily  - worsening renal function, might need HD as per Dr. Pena. from 10/22  IR for dialysis cath and then start HD as per renal team     #Reported Heroine abuse  -Patient denies any recent drug use despite positive UDS  -UDS positive for cocaine and marijuana      #UTI  Urine c/s  with >100K Enterobacter  completed rocephin    #LT knee draining boil. Stable  H/o MRSA before  completed Doxycycline    #Anemia likely chronic due to chronic disease.   s/p 2 units of pRBCs  No overt signs of bleediing  Monitor HH    #Thrombocytopenia- likely chronic  Monitor counts    # Dry Eyes: artificial tears    #HTN- better wit clonidine    #DVT proph- venodynes    Dispo: monitor renal function  f/u renal Bx    GOC and advance care planning meeting done with the patient. She wishes to be fully resuscitated and mechanically ventilated if need arises. There are no limitations of any sort in her medical care and management. She is FULL CODE.

## 2021-10-22 NOTE — PROGRESS NOTE ADULT - SUBJECTIVE AND OBJECTIVE BOX
Patient is a 41y Female who reports no complaints as new, breathing feels better. No CP or sob        MEDICATIONS  (STANDING):  ALBUTerol    90 MICROgram(s) HFA Inhaler 2 Puff(s) Inhalation every 6 hours  amLODIPine   Tablet 10 milliGRAM(s) Oral daily  artificial  tears Solution 1 Drop(s) Both EYES two times a day  calcium acetate 667 milliGRAM(s) Oral three times a day with meals  cloNIDine 0.1 milliGRAM(s) Oral daily  collagenase Ointment 1 Application(s) Topical daily  gabapentin 100 milliGRAM(s) Oral three times a day  hydroxychloroquine 200 milliGRAM(s) Oral daily  influenza   Vaccine 0.5 milliLiter(s) IntraMuscular once  methylPREDNISolone sodium succinate IVPB 250 milliGRAM(s) IV Intermittent daily  pantoprazole    Tablet 40 milliGRAM(s) Oral before breakfast    MEDICATIONS  (PRN):  acetaminophen   Tablet .. 650 milliGRAM(s) Oral every 6 hours PRN Temp greater or equal to 38C (100.4F), Mild Pain (1 - 3)  ALBUTerol    90 MICROgram(s) HFA Inhaler 1 Puff(s) Inhalation every 6 hours PRN SOB/Wheezing/Congestion  diphenhydrAMINE 25 milliGRAM(s) Oral every 4 hours PRN Rash and/or Itching  loperamide 2 milliGRAM(s) Oral three times a day PRN Diarrhea  melatonin 3 milliGRAM(s) Oral at bedtime PRN Insomnia  ondansetron Injectable 4 milliGRAM(s) IV Push every 8 hours PRN Nausea and/or Vomiting  oxycodone    5 mG/acetaminophen 325 mG 2 Tablet(s) Oral every 6 hours PRN Severe Pain (7 - 10)        T(C): , Max: 36.4 (10-21-21 @ 16:05)  T(F): , Max: 97.6 (10-21-21 @ 16:05)  HR: 90 (10-22-21 @ 08:28)  BP: 143/92 (10-22-21 @ 08:28)  BP(mean): --  RR: 17 (10-22-21 @ 08:28)  SpO2: 100% (10-22-21 @ 08:28)  Wt(kg): --        PHYSICAL EXAM:    Constitutional: NAD, frail. >then stated age  HEENT: dry MM  Neck: No LAD, No JVD  Respiratory: dist  Cardiovascular: S1 and S2, RRR  Gastrointestinal: BS+, soft, NT/ND  Extremities: + peripheral edema  Neurological: A/O x 3, no focal deficits  Psychiatric: Normal mood, normal affect  : No Salgado  Skin: No rashes  Access: Not applicable        LABS:                        8.4    6.14  )-----------( 205      ( 22 Oct 2021 04:08 )             26.1     22 Oct 2021 04:08    135    |  104    |  108    ----------------------------<  132    5.1     |  19     |  7.71   21 Oct 2021 20:27    133    |  103    |  103    ----------------------------<  139    4.9     |  17     |  7.55   21 Oct 2021 09:33    134    |  104    |  99     ----------------------------<  172    4.8     |  17     |  7.59   20 Oct 2021 11:33    135    |  104    |  89     ----------------------------<  109    5.2     |  20     |  7.01   19 Oct 2021 05:18    139    |  107    |  75     ----------------------------<  88     4.7     |  23     |  6.47     Ca    7.3        22 Oct 2021 04:08  Ca    7.5        21 Oct 2021 20:27  Ca    7.5        21 Oct 2021 09:33  Ca    7.9        20 Oct 2021 11:33  Ca    7.7        19 Oct 2021 05:18  Phos  9.3       21 Oct 2021 09:33  Phos  8.9       20 Oct 2021 11:33    TPro  x      /  Alb  2.0    /  TBili  x      /  DBili  x      /  AST  x      /  ALT  x      /  AlkPhos  x      21 Oct 2021 09:33          Urine Studies:          RADIOLOGY & ADDITIONAL STUDIES:

## 2021-10-22 NOTE — BRIEF OPERATIVE NOTE - OPERATION/FINDINGS
15cm 14F DL nontunneled dialysis catheter placed with US and Fluoro guidance, tip in SVC/RA, RIJV access

## 2021-10-22 NOTE — PROGRESS NOTE ADULT - ASSESSMENT
41 female with anemia, HTN, RA, SLE was told to go to ED for evaluation of elevated creatinine from rheum office after long absence of followup       NATALIA   renal path : severe crescentic GN with underlying sclerosis and fibrosis associated with tubular necrosis and interstitial inflammation  - P- ANCA related w +/- cocaine related   -Cr now rising despite iv steroids, therapy ongoing per Rheum  -Plan for HD initiation per Dr Pena, patient is pending IR for catheter  -Will plan for HD 1 today vs AM based on IR timing of placement   -await final MPO/PR3 titers  -await antihistone Ab's    Dr. Saini to cover the weekend  case d/c with RN staff, HD staff, Dr Pena and Dr Saini

## 2021-10-22 NOTE — PROGRESS NOTE ADULT - SUBJECTIVE AND OBJECTIVE BOX
Cheif complaints and Diagnosis: ARF/ Lupus/ IVDA    Subjective:   no new complaints  worsening BUN/Cr;   H/H stable   dry eyes better    REVIEW OF SYSTEMS:    CONSTITUTIONAL: No weakness, fevers or chills  EYES/ENT: No visual changes;  No vertigo or throat pain   NECK: No pain or stiffness  RESPIRATORY: No cough, wheezing, hemoptysis; No shortness of breath  CARDIOVASCULAR: No chest pain or palpitations  GASTROINTESTINAL: No abdominal or epigastric pain. No nausea, vomiting, or hematemesis; No diarrhea or constipation. No melena or hematochezia.  GENITOURINARY: No dysuria, frequency or hematuria  NEUROLOGICAL: No numbness or weakness  SKIN: No itching, burning, rashes, or lesions   All other review of systems is negative unless indicated above      PHYSICAL EXAM:    Vital Signs Last 24 Hrs  T(C): 36.1 (22 Oct 2021 11:18), Max: 36.4 (21 Oct 2021 23:30)  T(F): 97 (22 Oct 2021 11:18), Max: 97.6 (21 Oct 2021 23:30)  HR: 88 (22 Oct 2021 11:18) (88 - 96)  BP: 116/82 (22 Oct 2021 11:18) (116/82 - 143/92)  BP(mean): --  RR: 16 (22 Oct 2021 11:18) (16 - 18)  SpO2: 100% (22 Oct 2021 08:28) (96% - 100%)    Constitutional: Weak appearing  HEENT: Atraumatic, ENE, Normal, No congestion  Respiratory: Breath Sounds normal, no rhonchi/wheeze  Cardiovascular: N S1S2;   Gastrointestinal: Abdomen soft, non tender, Bowel Sounds present  Extremities: No edema, peripheral pulses present  Neurological: AAO x 3, no gross focal motor deficits  Skin: Non cellulitic, no rash, ulcers  Lymph Nodes: No lymphadenopathy noted  Back: No CVA tenderness   Musculoskeletal: non tender  Breasts: Deferred  Genitourinary: deferred  Rectal: Deferred    10-22    135  |  104  |  108<H>  ----------------------------<  132<H>  5.1   |  19<L>  |  7.71<H>    Ca    7.3<L>      22 Oct 2021 04:08  Phos  9.3     10-21    TPro  x   /  Alb  2.0<L>  /  TBili  x   /  DBili  x   /  AST  x   /  ALT  x   /  AlkPhos  x   10-21      10-21    134<L>  |  104  |  99<H>  ----------------------------<  172<H>  4.8   |  17<L>  |  7.59<H>    Ca    7.5<L>      21 Oct 2021 09:33  Phos  9.3     10-21    TPro  x   /  Alb  2.0<L>  /  TBili  x   /  DBili  x   /  AST  x   /  ALT  x   /  AlkPhos  x   10-21    Lab Results:  CBC  CBC Full  -  ( 20 Oct 2021 11:33 )  WBC Count : x  RBC Count : x  Hemoglobin : 8.5 g/dL  Hematocrit : 26.1 %  Platelet Count - Automated : x  Mean Cell Volume : x  Mean Cell Hemoglobin : x  Mean Cell Hemoglobin Concentration : x  Auto Neutrophil # : x  Auto Lymphocyte # : x  Auto Monocyte # : x  Auto Eosinophil # : x  Auto Basophil # : x  Auto Neutrophil % : x  Auto Lymphocyte % : x  Auto Monocyte % : x  Auto Eosinophil % : x  Auto Basophil % : x                          8.8    x     )-----------( x        ( 21 Oct 2021 12:41 )             27.4       .		Differential:	[] Automated		[] Manual  Chemistry                        8.5    x     )-----------( x        ( 20 Oct 2021 11:33 )             26.1     10-20    135  |  104  |  89<H>  ----------------------------<  109<H>  5.2   |  20<L>  |  7.01<H>    Ca    7.9<L>      20 Oct 2021 11:33  Phos  8.9     10-20                  MICROBIOLOGY/CULTURES:  Culture Results:   No growth to date. (10-15 @ 12:02)  Culture Results:   No growth to date. (10-15 @ 12:02)  Culture Results:   GI PCR Results: NOT detected  *******Please Note:*******  GI panel PCR evaluates for:  Campylobacter, Plesiomonas shigelloides, Salmonella,  Vibrio, Yersinia enterocolitica, Enteroaggregative  Escherichia coli (EAEC), Enteropathogenic E.coli (EPEC),  Enterotoxigenic E. coli (ETEC) lt/st, Shiga-like  toxin-producing E. coli (STEC) stx1/stx2,  Shigella/ Enteroinvasive E. coli (EIEC), Cryptosporidium,  Cyclospora cayetanensis, Entamoeba histolytica,  Giardia lamblia, Adenovirus F 40/41, Astrovirus,  Norovirus GI/GII, Rotavirus A, Sapovirus (10-14 @ 12:30)        MEDICATIONS  (STANDING):  ALBUTerol    90 MICROgram(s) HFA Inhaler 2 Puff(s) Inhalation every 6 hours  amLODIPine   Tablet 10 milliGRAM(s) Oral daily  artificial  tears Solution 1 Drop(s) Both EYES two times a day  calcium acetate 667 milliGRAM(s) Oral three times a day with meals  cloNIDine 0.1 milliGRAM(s) Oral daily  collagenase Ointment 1 Application(s) Topical daily  gabapentin 100 milliGRAM(s) Oral three times a day  hydroxychloroquine 200 milliGRAM(s) Oral daily  influenza   Vaccine 0.5 milliLiter(s) IntraMuscular once  methylPREDNISolone sodium succinate IVPB 250 milliGRAM(s) IV Intermittent daily  pantoprazole    Tablet 40 milliGRAM(s) Oral before breakfast    MEDICATIONS  (PRN):  acetaminophen   Tablet .. 650 milliGRAM(s) Oral every 6 hours PRN Temp greater or equal to 38C (100.4F), Mild Pain (1 - 3)  ALBUTerol    90 MICROgram(s) HFA Inhaler 1 Puff(s) Inhalation every 6 hours PRN SOB/Wheezing/Congestion  diphenhydrAMINE 25 milliGRAM(s) Oral every 4 hours PRN Rash and/or Itching  loperamide 2 milliGRAM(s) Oral three times a day PRN Diarrhea  melatonin 3 milliGRAM(s) Oral at bedtime PRN Insomnia  ondansetron Injectable 4 milliGRAM(s) IV Push every 8 hours PRN Nausea and/or Vomiting  oxycodone    5 mG/acetaminophen 325 mG 2 Tablet(s) Oral every 6 hours PRN Severe Pain (7 - 10)

## 2021-10-23 LAB
ALBUMIN SERPL ELPH-MCNC: 2.2 G/DL — LOW (ref 3.3–5)
ANION GAP SERPL CALC-SCNC: 15 MMOL/L — SIGNIFICANT CHANGE UP (ref 5–17)
BASOPHILS # BLD AUTO: 0 K/UL — SIGNIFICANT CHANGE UP (ref 0–0.2)
BASOPHILS NFR BLD AUTO: 0 % — SIGNIFICANT CHANGE UP (ref 0–2)
BUN SERPL-MCNC: 92 MG/DL — HIGH (ref 7–23)
CALCIUM SERPL-MCNC: 7.6 MG/DL — LOW (ref 8.5–10.1)
CHLORIDE SERPL-SCNC: 104 MMOL/L — SIGNIFICANT CHANGE UP (ref 96–108)
CO2 SERPL-SCNC: 18 MMOL/L — LOW (ref 22–31)
CREAT SERPL-MCNC: 6.44 MG/DL — HIGH (ref 0.5–1.3)
EOSINOPHIL # BLD AUTO: 0 K/UL — SIGNIFICANT CHANGE UP (ref 0–0.5)
EOSINOPHIL NFR BLD AUTO: 0 % — SIGNIFICANT CHANGE UP (ref 0–6)
GLUCOSE SERPL-MCNC: 201 MG/DL — HIGH (ref 70–99)
HCT VFR BLD CALC: 24.8 % — LOW (ref 34.5–45)
HGB BLD-MCNC: 8.2 G/DL — LOW (ref 11.5–15.5)
LYMPHOCYTES # BLD AUTO: 0.47 K/UL — LOW (ref 1–3.3)
LYMPHOCYTES # BLD AUTO: 6 % — LOW (ref 13–44)
MCHC RBC-ENTMCNC: 28.4 PG — SIGNIFICANT CHANGE UP (ref 27–34)
MCHC RBC-ENTMCNC: 33.1 GM/DL — SIGNIFICANT CHANGE UP (ref 32–36)
MCV RBC AUTO: 85.8 FL — SIGNIFICANT CHANGE UP (ref 80–100)
MONOCYTES # BLD AUTO: 0.47 K/UL — SIGNIFICANT CHANGE UP (ref 0–0.9)
MONOCYTES NFR BLD AUTO: 6 % — SIGNIFICANT CHANGE UP (ref 2–14)
NEUTROPHILS # BLD AUTO: 6.75 K/UL — SIGNIFICANT CHANGE UP (ref 1.8–7.4)
NEUTROPHILS NFR BLD AUTO: 86 % — HIGH (ref 43–77)
NRBC # BLD: SIGNIFICANT CHANGE UP /100 WBCS (ref 0–0)
PHOSPHATE SERPL-MCNC: 8.7 MG/DL — HIGH (ref 2.5–4.5)
PLATELET # BLD AUTO: 284 K/UL — SIGNIFICANT CHANGE UP (ref 150–400)
POTASSIUM SERPL-MCNC: 3.9 MMOL/L — SIGNIFICANT CHANGE UP (ref 3.5–5.3)
POTASSIUM SERPL-SCNC: 3.9 MMOL/L — SIGNIFICANT CHANGE UP (ref 3.5–5.3)
RBC # BLD: 2.89 M/UL — LOW (ref 3.8–5.2)
RBC # FLD: 14.7 % — HIGH (ref 10.3–14.5)
SODIUM SERPL-SCNC: 137 MMOL/L — SIGNIFICANT CHANGE UP (ref 135–145)
WBC # BLD: 7.85 K/UL — SIGNIFICANT CHANGE UP (ref 3.8–10.5)
WBC # FLD AUTO: 7.85 K/UL — SIGNIFICANT CHANGE UP (ref 3.8–10.5)

## 2021-10-23 PROCEDURE — 99233 SBSQ HOSP IP/OBS HIGH 50: CPT

## 2021-10-23 PROCEDURE — 99232 SBSQ HOSP IP/OBS MODERATE 35: CPT

## 2021-10-23 RX ADMIN — ALBUTEROL 2 PUFF(S): 90 AEROSOL, METERED ORAL at 21:46

## 2021-10-23 RX ADMIN — Medication 0.1 MILLIGRAM(S): at 11:46

## 2021-10-23 RX ADMIN — AMLODIPINE BESYLATE 10 MILLIGRAM(S): 2.5 TABLET ORAL at 11:46

## 2021-10-23 RX ADMIN — Medication 200 MILLIGRAM(S): at 11:48

## 2021-10-23 RX ADMIN — Medication 2 MILLIGRAM(S): at 08:15

## 2021-10-23 RX ADMIN — Medication 1 DROP(S): at 21:35

## 2021-10-23 RX ADMIN — Medication 1 DROP(S): at 08:17

## 2021-10-23 RX ADMIN — OXYCODONE AND ACETAMINOPHEN 2 TABLET(S): 5; 325 TABLET ORAL at 00:13

## 2021-10-23 RX ADMIN — Medication 667 MILLIGRAM(S): at 11:42

## 2021-10-23 RX ADMIN — Medication 3 MILLIGRAM(S): at 23:49

## 2021-10-23 RX ADMIN — OXYCODONE AND ACETAMINOPHEN 2 TABLET(S): 5; 325 TABLET ORAL at 11:46

## 2021-10-23 RX ADMIN — Medication 667 MILLIGRAM(S): at 17:42

## 2021-10-23 RX ADMIN — OXYCODONE AND ACETAMINOPHEN 2 TABLET(S): 5; 325 TABLET ORAL at 12:30

## 2021-10-23 RX ADMIN — ALBUTEROL 2 PUFF(S): 90 AEROSOL, METERED ORAL at 17:00

## 2021-10-23 RX ADMIN — OXYCODONE AND ACETAMINOPHEN 2 TABLET(S): 5; 325 TABLET ORAL at 17:44

## 2021-10-23 RX ADMIN — Medication 200 MILLIGRAM(S): at 11:42

## 2021-10-23 RX ADMIN — Medication 667 MILLIGRAM(S): at 07:39

## 2021-10-23 RX ADMIN — GABAPENTIN 100 MILLIGRAM(S): 400 CAPSULE ORAL at 21:36

## 2021-10-23 RX ADMIN — GABAPENTIN 100 MILLIGRAM(S): 400 CAPSULE ORAL at 13:54

## 2021-10-23 RX ADMIN — GABAPENTIN 100 MILLIGRAM(S): 400 CAPSULE ORAL at 05:26

## 2021-10-23 RX ADMIN — OXYCODONE AND ACETAMINOPHEN 2 TABLET(S): 5; 325 TABLET ORAL at 05:26

## 2021-10-23 RX ADMIN — PANTOPRAZOLE SODIUM 40 MILLIGRAM(S): 20 TABLET, DELAYED RELEASE ORAL at 05:26

## 2021-10-23 RX ADMIN — Medication 1 APPLICATION(S): at 11:42

## 2021-10-23 RX ADMIN — Medication 2 MILLIGRAM(S): at 17:43

## 2021-10-23 RX ADMIN — OXYCODONE AND ACETAMINOPHEN 2 TABLET(S): 5; 325 TABLET ORAL at 23:49

## 2021-10-23 NOTE — PROGRESS NOTE ADULT - ASSESSMENT
42 y/o woman with hx of RA and SLE w/ Rheum, Dr. Del Rosario coming in w/ high Creat w/ Proteinuria > 7gm w/ Hematuria w/ low C3/C4 w/ UTI.    -DSDNA elevated, SSA, SSB+, complements low, +P-ANCA.  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  -Patient had second HD, tolerated well  - Discussed case with Dr. Pena. She has completed 3 days of high dose steroid therapy.  Will lower steroids back down to 1mg/kg daily starting on 10/24/21.  Will discuss further immunosuppressive therapy (Cytoxan, rituxan) with renal.  Would also need input from ID regarding same.  I informed the patient that these therapies typically take several weeks to take effect, so we are not expecting any immediate results from this.  She understands.    -Will repeat ANCAs for trend  -Monitor glucose, maintain PPI while on steroid  -continue HCQ 200mg daily, closer to wt based   will follow

## 2021-10-23 NOTE — PROGRESS NOTE ADULT - SUBJECTIVE AND OBJECTIVE BOX
CHIEF COMPLAINT:    SUBJECTIVE: Diarrhea, otherwise no new complaints.  Tolerated HD x 2 without issue.      REVIEW OF SYSTEMS:    negative except as above    Vital Signs Last 24 Hrs  T(C): 36.3 (23 Oct 2021 15:57), Max: 36.3 (23 Oct 2021 08:16)  T(F): 97.4 (23 Oct 2021 15:57), Max: 97.4 (23 Oct 2021 08:16)  HR: 97 (23 Oct 2021 15:57) (82 - 97)  BP: 133/86 (23 Oct 2021 15:57) (125/84 - 153/96)  BP(mean): --  RR: 17 (23 Oct 2021 15:57) (16 - 17)  SpO2: 100% (23 Oct 2021 15:57) (100% - 100%)    I&O's Summary    23 Oct 2021 07:01  -  23 Oct 2021 16:31  --------------------------------------------------------  IN: 500 mL / OUT: 1500 mL / NET: -1000 mL        CAPILLARY BLOOD GLUCOSE          PHYSICAL EXAM:    Constitutional: NAD, awake and alert, well-developed  HEENT- no oral lesions noted, no lymphadenoapthy noted  Heart- S1 S2 reg  Lungs- CTA b/l  Abd- Soft NT  Ext- no edema  Skin- no rashes  Musculoskelatal- FROM all joints, no active synovitis noted  Neurological- intact strength upper and lower extremities      MEDICATIONS:  MEDICATIONS  (STANDING):  ALBUTerol    90 MICROgram(s) HFA Inhaler 2 Puff(s) Inhalation every 6 hours  amLODIPine   Tablet 10 milliGRAM(s) Oral daily  artificial  tears Solution 1 Drop(s) Both EYES two times a day  calcium acetate 667 milliGRAM(s) Oral three times a day with meals  cloNIDine 0.1 milliGRAM(s) Oral daily  collagenase Ointment 1 Application(s) Topical daily  gabapentin 100 milliGRAM(s) Oral three times a day  hydroxychloroquine 200 milliGRAM(s) Oral daily  influenza   Vaccine 0.5 milliLiter(s) IntraMuscular once  pantoprazole    Tablet 40 milliGRAM(s) Oral before breakfast      LABS: All Labs Reviewed:                        8.2    7.85  )-----------( 284      ( 23 Oct 2021 08:45 )             24.8     10-23    137  |  104  |  92<H>  ----------------------------<  201<H>  3.9   |  18<L>  |  6.44<H>    Ca    7.6<L>      23 Oct 2021 08:45  Phos  8.7     10-23    TPro  x   /  Alb  2.2<L>  /  TBili  x   /  DBili  x   /  AST  x   /  ALT  x   /  AlkPhos  x   10-23          Blood Culture:     RADIOLOGY/EKG:    A/P:

## 2021-10-23 NOTE — PROGRESS NOTE ADULT - SUBJECTIVE AND OBJECTIVE BOX
39 female with anemia, HTN, RA, SLE on hydroxychloroquine was told to go to ED for evaluation of elevated creatinine  Cr 10- was 5.8 where baseline < 1.0. Patient with a recent admit for NATALIA with peak creatinine in the 2's, seen by Dr Pena. Has a history of IVDA as well. Patient reports seeing rheum/labs in january and missed last appt. She reports going to Dr Rogers for most recent lab draw and sent in for the results. She reports going to this appt as she thought it odd that she had pink urine. Uses meloxicam 15 mg qd, has used aleve x 2 tabs but usually supplements w acetaminophen.    10/23  second dialysis doing well  has profuse HD as per pt  otherwise comfortable  vvs  Labs noted PR3/ MPO levels available      PAST MEDICAL & SURGICAL HISTORY:  Lupus    Rheumatoid arthritis    H/O Raynaud&#x27;s syndrome    Heroin abuse    Smoker    Rheumatoid arthritis    Sepsis    HTN (hypertension)    COPD (chronic obstructive pulmonary disease)    Depression    Epilepsy    GERD (gastroesophageal reflux disease)    Raynaud&#x27;s syndrome without gangrene    Bacteremia    Systemic lupus erythematosus    Aphonia    H/O tubal ligation    H/O appendicitis    Gall bladder stones    H/O tracheostomy    S/P percutaneous endoscopic gastrostomy (PEG) tube placement        MEDICATIONS  (STANDING):  ALBUTerol    90 MICROgram(s) HFA Inhaler 2 Puff(s) Inhalation every 6 hours  amLODIPine   Tablet 10 milliGRAM(s) Oral daily  artificial  tears Solution 1 Drop(s) Both EYES two times a day  calcium acetate 667 milliGRAM(s) Oral three times a day with meals  cloNIDine 0.1 milliGRAM(s) Oral daily  collagenase Ointment 1 Application(s) Topical daily  gabapentin 100 milliGRAM(s) Oral three times a day  hydroxychloroquine 200 milliGRAM(s) Oral daily  influenza   Vaccine 0.5 milliLiter(s) IntraMuscular once  methylPREDNISolone sodium succinate IVPB 250 milliGRAM(s) IV Intermittent daily  pantoprazole    Tablet 40 milliGRAM(s) Oral before breakfast    MEDICATIONS  (PRN):  acetaminophen   Tablet .. 650 milliGRAM(s) Oral every 6 hours PRN Temp greater or equal to 38C (100.4F), Mild Pain (1 - 3)  ALBUTerol    90 MICROgram(s) HFA Inhaler 1 Puff(s) Inhalation every 6 hours PRN SOB/Wheezing/Congestion  diphenhydrAMINE 25 milliGRAM(s) Oral every 4 hours PRN Rash and/or Itching  loperamide 2 milliGRAM(s) Oral three times a day PRN Diarrhea  melatonin 3 milliGRAM(s) Oral at bedtime PRN Insomnia  ondansetron Injectable 4 milliGRAM(s) IV Push every 8 hours PRN Nausea and/or Vomiting  oxycodone    5 mG/acetaminophen 325 mG 2 Tablet(s) Oral every 6 hours PRN Severe Pain (7 - 10)    Allergies    morphine (Rash)  morphine (Unknown)    Intolerances        SOCIAL HISTORY:  no etoh now    FAMILY HISTORY:  No pertinent family history in first degree relatives  cannot recall family history at this time      REVIEW OF SYSTEMS:    CONSTITUTIONAL: stable weakness, fevers or chills  EYES/ENT: No visual changes;  No vertigo or throat pain   NECK: No pain or stiffness  RESPIRATORY: No cough, wheezing, hemoptysis; No shortness of breath  CARDIOVASCULAR: No chest pain or palpitations  GASTROINTESTINAL: No abdominal or epigastric pain. No nausea, vomiting, or hematemesis; No diarrhea or constipation. No melena or hematochezia.  GENITOURINARY: No dysuria, frequency or hematuria  NEUROLOGICAL: No numbness or weakness  SKIN: No itching, burning, rashes, or lesions   All other review of systems is negative unless indicated above.    Vital Signs Last 24 Hrs  T(C): 36.3 (23 Oct 2021 08:16), Max: 36.3 (23 Oct 2021 08:16)  T(F): 97.4 (23 Oct 2021 08:16), Max: 97.4 (23 Oct 2021 08:16)  HR: 89 (23 Oct 2021 10:48) (82 - 96)  BP: 151/98 (23 Oct 2021 10:48) (116/82 - 153/96)  BP(mean): --  RR: 16 (23 Oct 2021 10:48) (16 - 17)  SpO2: --    PHYSICAL EXAM:    Constitutional: NAD, frail. >then stated age  HEENT: dry MM  Neck: No LAD, No JVD  Respiratory: dist  Cardiovascular: S1 and S2  Gastrointestinal: BS+, soft  Extremities: No peripheral edema, le contorted  Neurological: A/O x 3        LABS:                          8.2    7.85  )-----------( 284      ( 23 Oct 2021 08:45 )             24.8                           6.6    4.22  )-----------( 82       ( 12 Oct 2021 09:54 )             20.0     10-23    137  |  104  |  92<H>  ----------------------------<  201<H>  3.9   |  18<L>  |  6.44<H>    Ca    7.6<L>      23 Oct 2021 08:45  Phos  8.7     10-23    TPro  x   /  Alb  2.2<L>  /  TBili  x   /  DBili  x   /  AST  x   /  ALT  x   /  AlkPhos  x   10-23      12 Oct 2021 09:54    134    |  104    |  106    ----------------------------<  108    4.5     |  15     |  6.51   11 Oct 2021 22:25    133    |  101    |  97     ----------------------------<  89     3.9     |  17     |  6.72     Ca    7.0        12 Oct 2021 09:54  Ca    7.3        11 Oct 2021 22:25  Phos  7.7       12 Oct 2021 09:54    TPro  7.4    /  Alb  2.5    /  TBili  0.2    /  DBili  x      /  AST  23     /  ALT  13     /  AlkPhos  60     11 Oct 2021 22:25      Creatine Kinase, Serum: 142 U/L [26 - 192] (10-11 @ 22:25)      Urine Studies:  Urinalysis Basic - ( 11 Oct 2021 22:25 )    Color: Yellow / Appearance: Slightly Turbid / S.015 / pH: x  Gluc: x / Ketone: Negative  / Bili: Negative / Urobili: Negative mg/dL   Blood: x / Protein: 500 mg/dL / Nitrite: Negative   Leuk Esterase: Moderate / RBC: >50 /HPF / WBC 26-50   Sq Epi: x / Non Sq Epi: Occasional / Bacteria: TNTC            RADIOLOGY & ADDITIONAL STUDIES:

## 2021-10-23 NOTE — PROGRESS NOTE ADULT - ASSESSMENT
39 female with anemia, HTN, RA, SLE on hydroxychloroquine was told to go to ED for evaluation of elevated creatinine  Cr 10- was 5.8 where baseline < 1.0.     NATALIA  -Pre-renal/NSAID related , other consideration is lupus related  -IVF, convert to bicarb based  -NO acute need for HD  -Lupus serology  -Treatment of underlying UTI    Acidosis  -COnversion to bicarb based ivf      Hyperphos  -Will need binders if not stabilizing with hydration    Anemia  Managment per medicine    thanks, will follow  d/c with RN staff, Dr Cornejo  Called Dr. Mckeon for discussion, she is unavailable and no RN available to go over labs    10/23  seen on HD  on hydroxychloroquine 200 milliGRAM(s) Oral daily  methylPREDNISolone sodium succinate IVPB 250 milliGRAM(s) IV Intermittent daily  Serologies noted  Rheum following

## 2021-10-23 NOTE — PROGRESS NOTE ADULT - SUBJECTIVE AND OBJECTIVE BOX
CHIEF COMPLAINT: Sent for elevated creatinine    INTERVAL HX: Underwent dialysis catheter placement yesterday PM, had HD session thereafter and again today. Tolerated that well. Feels overall well aside from some generalized weakness and physical deconditioning.     ROS: Multisystem review is comprehensively negative x 10 systems except as above    EXAM:  Vital Signs Last 24 Hrs  T(F): 97.4 (23 Oct 2021 15:57), Max: 97.4 (23 Oct 2021 08:16)  HR: 97 (23 Oct 2021 15:57) (85 - 97)  BP: 133/86 (23 Oct 2021 15:57) (125/84 - 153/96)  RR: 17 (23 Oct 2021 15:57) (16 - 17)  SpO2: 100% (23 Oct 2021 15:57) (100% - 100%)    Constitutional: No acute distress  HEENT: NCAT PERRL EOMI, no oral lesions noted  Neck: No lymphadenopathy  Heart: S1 S2 normal, RRR  Lungs: CTA b/l  Abd: +BS Soft NT  Ext: No edema  Skin: No rashes  Musculoskeletal FROM all joints, no active synovitis noted  Neuro: Intact strength upper and lower extremities    LABS:            CBC 10/23               8.2    7.85  )-----------( 284                   24.8     BMP 10/23    137  |  104  |  92  ----------------------------<  201  3.9   |   18   |  6.44    Ca 7.6 (Alb 2.2)  Phos 8.7        MICRO:  COVID 19 PCR 10/21: Neg  COVID 19 PCR 10/18: Neg  Bld Cx 10/15: NGTD  GI pathogens PCR 10/14: Neg  C.diff PCR 10/14: Neg  Bld Cx 10/13: Neg  Bld Cx 10/13: S.epi    IMAGING:  CXR 10/16: Frontal expiratory view of the chest shows the heart to be normal in size. The lungs show mild left base atelectasis and there is no evidence of pneumothorax nor pleural effusion.    MRI pelvis 10/13:  A decubitus ulcer is identified along the rightward aspect of the sacrum overlying the S4-coccyx levels. The soft tissue wound measures approximately 39 x 72 mm. The wound extends to the level of the subcutaneous fat and approaches the posterior cortex of the sacrum/coccyx. There is no associated abscess or drainable fluid collection. No increased STIR signal or loss of T1 hyperintense signal to suggest acute osteomyelitis. No acute fracture. No osteonecrosis. No focal muscle edema. Sacroiliac Joints Normal. Lower Lumbar Spine Normal. Moderate to large amount of pelvic free fluid.    CT A/P WO 10/12: Limited evaluation without intravenous contrast.4 mm right lower lobe nodule (2:7). New since previous exam. This is indeterminant. Subtle tree-in-bud type opacities in the right lower lobe almost completely resolved when compared to the previous exam from 7/23/2020. No hydronephrosis. 2 mm nonobstructing left renal mid segment calcification. Ill-defined left para-aortic soft tissue may represent lymphadenopathy. This is very poorly evaluated on this exam. Mildly enlarged bilateral pelvic and inguinal lymph nodes.. Further evaluation is needed contrast-enhanced examination is recommended.    CARDIAC TESTING:  TTE 10/15: Left ventricle is normal in size, wall thickness, wall motion and contractility. Estimated left ventricular ejection fraction is 50-55 %. Mild (1+) mitral regurgitation. Mild (1+) tricuspid valve regurgitation.    MEDS:  MEDICATIONS  (STANDING):  ALBUTerol    90 MICROgram(s) HFA Inhaler 2 Puff(s) Inhalation every 6 hours  amLODIPine   Tablet 10 milliGRAM(s) Oral daily  artificial  tears Solution 1 Drop(s) Both EYES two times a day  calcium acetate 667 milliGRAM(s) Oral three times a day with meals  cloNIDine 0.1 milliGRAM(s) Oral daily  collagenase Ointment 1 Application(s) Topical daily  gabapentin 100 milliGRAM(s) Oral three times a day  hydroxychloroquine 200 milliGRAM(s) Oral daily  influenza   Vaccine 0.5 milliLiter(s) IntraMuscular once  pantoprazole    Tablet 40 milliGRAM(s) Oral before breakfast    MEDICATIONS  (PRN):  acetaminophen   Tablet .. 650 milliGRAM(s) Oral every 6 hours PRN Temp greater or equal to 38C (100.4F), Mild Pain (1 - 3)  ALBUTerol    90 MICROgram(s) HFA Inhaler 1 Puff(s) Inhalation every 6 hours PRN SOB/Wheezing/Congestion  diphenhydrAMINE 25 milliGRAM(s) Oral every 4 hours PRN Rash and/or Itching  loperamide 2 milliGRAM(s) Oral three times a day PRN Diarrhea  melatonin 3 milliGRAM(s) Oral at bedtime PRN Insomnia  ondansetron Injectable 4 milliGRAM(s) IV Push every 8 hours PRN Nausea and/or Vomiting  oxycodone    5 mG/acetaminophen 325 mG 2 Tablet(s) Oral every 6 hours PRN Severe Pain (7 - 10)     CHIEF COMPLAINT: Sent for elevated creatinine    INTERVAL HX: Underwent dialysis catheter placement yesterday PM, had HD session thereafter and again today. Tolerated that well. Feels overall well aside from some generalized weakness and physical deconditioning.     ROS: Multisystem review is comprehensively negative x 10 systems except as above    EXAM:  Vital Signs Last 24 Hrs  T(F): 97.4 (23 Oct 2021 15:57), Max: 97.4 (23 Oct 2021 08:16)  HR: 97 (23 Oct 2021 15:57) (85 - 97)  BP: 133/86 (23 Oct 2021 15:57) (125/84 - 153/96)  RR: 17 (23 Oct 2021 15:57) (16 - 17)  SpO2: 100% (23 Oct 2021 15:57) (100% - 100%)    Constitutional: No acute distress  HEENT: NCAT PERRL EOMI, no oral lesions noted  Neck: No lymphadenopathy  Heart: S1 S2 normal, RRR  Lungs: CTA b/l  Abd: +BS Soft NT  Ext: No edema  Skin: No rashes  Musculoskeletal FROM all joints, no active synovitis noted  Neuro: Intact strength upper and lower extremities    LABS:            CBC 10/23               8.2    7.85  )-----------( 284                   24.8     BMP 10/23    137  |  104  |  92  ----------------------------<  201  3.9   |   18   |  6.44    Ca 7.6 (Alb 2.2)  Phos 8.7        MICRO:  COVID 19 PCR 10/21: Neg  COVID 19 PCR 10/18: Neg  Bld Cx 10/15: NGTD  GI pathogens PCR 10/14: Neg  C.diff PCR 10/14: Neg  Bld Cx 10/13: Neg  Bld Cx 10/13: S.epi    IMAGING:  CXR 10/16: Frontal expiratory view of the chest shows the heart to be normal in size. The lungs show mild left base atelectasis and there is no evidence of pneumothorax nor pleural effusion.    MRI pelvis 10/13:  A decubitus ulcer is identified along the rightward aspect of the sacrum overlying the S4-coccyx levels. The soft tissue wound measures approximately 39 x 72 mm. The wound extends to the level of the subcutaneous fat and approaches the posterior cortex of the sacrum/coccyx. There is no associated abscess or drainable fluid collection. No increased STIR signal or loss of T1 hyperintense signal to suggest acute osteomyelitis. No acute fracture. No osteonecrosis. No focal muscle edema. Sacroiliac Joints Normal. Lower Lumbar Spine Normal. Moderate to large amount of pelvic free fluid.    CT A/P WO 10/12: Limited evaluation without intravenous contrast.4 mm right lower lobe nodule (2:7). New since previous exam. This is indeterminant. Subtle tree-in-bud type opacities in the right lower lobe almost completely resolved when compared to the previous exam from 7/23/2020. No hydronephrosis. 2 mm nonobstructing left renal mid segment calcification. Ill-defined left para-aortic soft tissue may represent lymphadenopathy. This is very poorly evaluated on this exam. Mildly enlarged bilateral pelvic and inguinal lymph nodes.. Further evaluation is needed contrast-enhanced examination is recommended.    CARDIAC TESTING:  TTE 10/15: Left ventricle is normal in size, wall thickness, wall motion and contractility. Estimated left ventricular ejection fraction is 50-55 %. Mild (1+) mitral regurgitation. Mild (1+) tricuspid valve regurgitation.    MEDS:  MEDICATIONS  (STANDING):  ALBUTerol    90 MICROgram(s) HFA Inhaler 2 Puff(s) Inhalation every 6 hours  amLODIPine   Tablet 10 milliGRAM(s) Oral daily  artificial  tears Solution 1 Drop(s) Both EYES two times a day  calcium acetate 667 milliGRAM(s) Oral three times a day with meals  cloNIDine 0.1 milliGRAM(s) Oral daily  collagenase Ointment 1 Application(s) Topical daily  gabapentin 100 milliGRAM(s) Oral three times a day  heparin 5000 units subcut q12  hydroxychloroquine 200 milliGRAM(s) Oral daily  influenza   Vaccine 0.5 milliLiter(s) IntraMuscular once  pantoprazole    Tablet 40 milliGRAM(s) Oral before breakfast    MEDICATIONS  (PRN):  acetaminophen   Tablet .. 650 milliGRAM(s) Oral every 6 hours PRN Temp greater or equal to 38C (100.4F), Mild Pain (1 - 3)  ALBUTerol    90 MICROgram(s) HFA Inhaler 1 Puff(s) Inhalation every 6 hours PRN SOB/Wheezing/Congestion  diphenhydrAMINE 25 milliGRAM(s) Oral every 4 hours PRN Rash and/or Itching  loperamide 2 milliGRAM(s) Oral three times a day PRN Diarrhea  melatonin 3 milliGRAM(s) Oral at bedtime PRN Insomnia  ondansetron Injectable 4 milliGRAM(s) IV Push every 8 hours PRN Nausea and/or Vomiting  oxycodone    5 mG/acetaminophen 325 mG 2 Tablet(s) Oral every 6 hours PRN Severe Pain (7 - 10)

## 2021-10-23 NOTE — PROGRESS NOTE ADULT - ASSESSMENT
41 year-old woman with hx of RA and SLE, sent for further evaluation after noted to have elevated Cr, hematuria, proteinuria > 7 grams, Low C3/C4    Acute renal failure in setting of RA, SLE  DS DNA elevated, SSA, SSB+, complements low, +P-ANCA. 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 Completed 3 days of high dose steroid therapy. Underwent dialysis catheter placement yesterday PM, had HD session thereafter and again today. Tolerated that well. Feels overall well aside from some generalized weakness and physical deconditioning.   - Will lower steroids back down to 1mg/kg daily starting on 10/24/21.   - Will discuss further immunosuppressive therapy (Cytoxan, rituxan) with Renal and ID  - Will repeat ANCAs for trend  - Monitor glucose, maintain PPI while on steroid  - Continue HCQ 200mg daily, closer to wt based    41 year-old woman with HTN, COPD, depression, RA and SLE, sent for further evaluation after noted to have elevated Cr, hematuria, proteinuria > 7 grams, Low C3/C4.    Acute renal failure in setting of RA, SLE  DS DNA elevated, SSA, SSB+, complements low, +P-ANCA. 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 Completed 3 days of high dose steroid therapy. Underwent dialysis catheter placement 10/23, had HD session thereafter and again today 10/24. Tolerated that well. Feels overall well aside from some generalized weakness and physical deconditioning.   - Will lower steroids back down to 1mg/kg daily starting on 10/24/21.   - Will discuss further immunosuppressive therapy (Cytoxan, rituxan) with Renal and ID  - Will repeat ANCAs for trend  - Monitor glucose, maintain PPI while on steroid  - Continue HCQ 200mg daily, closer to wt based    41 year-old woman with HTN, COPD, depression, RA and SLE, sent for further evaluation after noted to have elevated Cr, hematuria, proteinuria > 7 grams, Low C3/C4.    Acute renal failure in setting of RA, SLE  DS DNA elevated, SSA, SSB+, complements low, +P-ANCA. 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 Completed 3 days of high dose steroid therapy. Underwent dialysis catheter placement 10/23, had HD session thereafter and again today 10/24. Tolerated that well. Feels overall well aside from some generalized weakness and physical deconditioning.   - Will lower steroids back down to 1mg/kg daily starting on 10/24/21.   - Will discuss further immunosuppressive therapy (Cytoxan, rituxan) with Renal and ID  - Will repeat ANCAs for trend  - Monitor glucose, maintain PPI while on steroid  - Continue HCQ 200mg daily, closer to wt based     HTN  BP controlled.   - On Norvasc    COPD  Breathing comfortably.   - On albuterol prn    Severe protein calorie malnutrition  Appreciate input from dietician  - Continue renal diet, trend weights          Diet: Renal diet  DVT px: Heparin subcut  Emergency contact: Blake Moses, brother [phone]  Dispo: Anticipate DC to home when clinically improved, may need arrangements for outpatient HD center, will discuss with Nephrology, , Social Worder, Dialysis Social Worker

## 2021-10-24 PROCEDURE — 99233 SBSQ HOSP IP/OBS HIGH 50: CPT

## 2021-10-24 PROCEDURE — 99232 SBSQ HOSP IP/OBS MODERATE 35: CPT

## 2021-10-24 RX ORDER — ERYTHROPOIETIN 10000 [IU]/ML
6000 INJECTION, SOLUTION INTRAVENOUS; SUBCUTANEOUS
Refills: 0 | Status: DISCONTINUED | OUTPATIENT
Start: 2021-10-24 | End: 2021-10-27

## 2021-10-24 RX ORDER — HEPARIN SODIUM 5000 [USP'U]/ML
5000 INJECTION INTRAVENOUS; SUBCUTANEOUS EVERY 12 HOURS
Refills: 0 | Status: DISCONTINUED | OUTPATIENT
Start: 2021-10-24 | End: 2021-10-27

## 2021-10-24 RX ADMIN — HEPARIN SODIUM 5000 UNIT(S): 5000 INJECTION INTRAVENOUS; SUBCUTANEOUS at 10:07

## 2021-10-24 RX ADMIN — GABAPENTIN 100 MILLIGRAM(S): 400 CAPSULE ORAL at 08:41

## 2021-10-24 RX ADMIN — OXYCODONE AND ACETAMINOPHEN 2 TABLET(S): 5; 325 TABLET ORAL at 14:47

## 2021-10-24 RX ADMIN — Medication 0.1 MILLIGRAM(S): at 10:03

## 2021-10-24 RX ADMIN — OXYCODONE AND ACETAMINOPHEN 2 TABLET(S): 5; 325 TABLET ORAL at 09:30

## 2021-10-24 RX ADMIN — Medication 1 DROP(S): at 21:11

## 2021-10-24 RX ADMIN — AMLODIPINE BESYLATE 10 MILLIGRAM(S): 2.5 TABLET ORAL at 10:03

## 2021-10-24 RX ADMIN — Medication 3 MILLIGRAM(S): at 21:12

## 2021-10-24 RX ADMIN — PANTOPRAZOLE SODIUM 40 MILLIGRAM(S): 20 TABLET, DELAYED RELEASE ORAL at 10:03

## 2021-10-24 RX ADMIN — Medication 1 APPLICATION(S): at 10:04

## 2021-10-24 RX ADMIN — Medication 667 MILLIGRAM(S): at 08:41

## 2021-10-24 RX ADMIN — ALBUTEROL 2 PUFF(S): 90 AEROSOL, METERED ORAL at 15:08

## 2021-10-24 RX ADMIN — Medication 667 MILLIGRAM(S): at 12:09

## 2021-10-24 RX ADMIN — OXYCODONE AND ACETAMINOPHEN 2 TABLET(S): 5; 325 TABLET ORAL at 15:30

## 2021-10-24 RX ADMIN — OXYCODONE AND ACETAMINOPHEN 2 TABLET(S): 5; 325 TABLET ORAL at 21:12

## 2021-10-24 RX ADMIN — ALBUTEROL 2 PUFF(S): 90 AEROSOL, METERED ORAL at 09:23

## 2021-10-24 RX ADMIN — Medication 200 MILLIGRAM(S): at 10:03

## 2021-10-24 RX ADMIN — Medication 2 MILLIGRAM(S): at 08:41

## 2021-10-24 RX ADMIN — Medication 30 MILLIGRAM(S): at 10:03

## 2021-10-24 RX ADMIN — Medication 1 DROP(S): at 10:03

## 2021-10-24 RX ADMIN — Medication 667 MILLIGRAM(S): at 17:29

## 2021-10-24 RX ADMIN — Medication 30 MILLIGRAM(S): at 21:11

## 2021-10-24 RX ADMIN — OXYCODONE AND ACETAMINOPHEN 2 TABLET(S): 5; 325 TABLET ORAL at 08:42

## 2021-10-24 RX ADMIN — GABAPENTIN 100 MILLIGRAM(S): 400 CAPSULE ORAL at 14:47

## 2021-10-24 RX ADMIN — ALBUTEROL 2 PUFF(S): 90 AEROSOL, METERED ORAL at 21:26

## 2021-10-24 RX ADMIN — HEPARIN SODIUM 5000 UNIT(S): 5000 INJECTION INTRAVENOUS; SUBCUTANEOUS at 21:11

## 2021-10-24 RX ADMIN — GABAPENTIN 100 MILLIGRAM(S): 400 CAPSULE ORAL at 21:12

## 2021-10-24 RX ADMIN — Medication 2 MILLIGRAM(S): at 16:00

## 2021-10-24 NOTE — PHYSICAL THERAPY INITIAL EVALUATION ADULT - SKIN INTEGRITY
healed ulcerative wounds noted lateral ankles bilaterally ,R 5th toe ,healed tracheostomy ,diffuse scarring to dorsal R FA/wrist/hand/scar/healed wound/ulcers (specify)

## 2021-10-24 NOTE — PHYSICAL THERAPY INITIAL EVALUATION ADULT - RANGE OF MOTION EXAMINATION, REHAB EVAL
R wrist contracted in ulnar deviation ,absent RD ,no active wrist EXT,unable to extend R 4th,5th digits ,partially extends R 1st,2nd,3rd fingers ; L shoulder mod.limited O/H FLEX/ABD/ER and L 4th digit PIP joint contacted at >90 degrees ,L knee contracted -30degrees active/-115 degrees P with pain/palpable shortening hamstring tendons ,R 5th toe is hyperextended at MTP joint ,unable to flex R 5th toe

## 2021-10-24 NOTE — PHYSICAL THERAPY INITIAL EVALUATION ADULT - SOCIAL CONCERNS
Urine Tox screen + cocaine ,marijuana ; pt with prior h/o IVDA/heroin abuse but claims to be clean for 6 years ,it is noted prior to 2020 admission pt had begun snorting 2 bags of heroin daily /relapsed due to depression, recent death of grandmother/Substance misuse

## 2021-10-24 NOTE — PHYSICAL THERAPY INITIAL EVALUATION ADULT - PRECAUTIONS/LIMITATIONS, REHAB EVAL
Contact isolation +ESBL urine 10/11/21; L knee contracture creates functional leg length discrepancy (L <R)/isolation precautions

## 2021-10-24 NOTE — PHYSICAL THERAPY INITIAL EVALUATION ADULT - DID THE PATIENT HAVE SURGERY?
insertion of dialysis catheter/yes insertion of dialysis catheter via RIJ vein access ; had core biopsy R kidney 10/18/yes

## 2021-10-24 NOTE — PHYSICAL THERAPY INITIAL EVALUATION ADULT - LIVES WITH, PROFILE
resides ranch style home with SO Blake Turpinueroa in [place],has ramp to enter home via WC/significant other

## 2021-10-24 NOTE — PHYSICAL THERAPY INITIAL EVALUATION ADULT - PATIENT PROFILE REVIEW, REHAB EVAL
yes pt with prior hospitalization 7/2020 with MRSA bacteremia/fungemia ,recurrent acute respiratory failure intubation/mechanical ventilation,required open tracheostomy 7/13/20 and PEG 7/16/20 ,was weaned to TC and discharged to [facility] 7/31/20 ,pt had multiple full thickness wounds to BLEs at that time associated with ?Raynauds/yes

## 2021-10-24 NOTE — PHYSICAL THERAPY INITIAL EVALUATION ADULT - ADDITIONAL COMMENTS
pt reports she was at [facility] x >6 months after hospitalization July 2020 (7/31/20-?) pt states she lives with others "there are people there but I don't really need their help ,I can do most everything by myself but mostly use the WC due to DJD L knee/L shoulder ,R wrist ; Pt states if housemate pushes her out to the car she is capable of driving her car

## 2021-10-24 NOTE — PROGRESS NOTE ADULT - ASSESSMENT
39 female with anemia, HTN, RA, SLE on hydroxychloroquine was told to go to ED for evaluation of elevated creatinine  Cr 10- was 5.8 where baseline < 1.0.     NATALIA  -Pre-renal/NSAID related , other consideration is lupus related  -IVF, convert to bicarb based  -NO acute need for HD  -Lupus serology  -Treatment of underlying UTI    Acidosis  -COnversion to bicarb based ivf      Hyperphos  -Will need binders if not stabilizing with hydration    Anemia  Managment per medicine    thanks, will follow  d/c with RN staff, Dr Cornejo  Called Dr. Mckeon for discussion, she is unavailable and no RN available to go over labs    10/23  seen on HD  on hydroxychloroquine 200 milliGRAM(s) Oral daily  methylPREDNISolone sodium succinate IVPB 250 milliGRAM(s) IV Intermittent daily  Serologies noted  Rheum following    10/24  d/w Rheum re Rituxan  will need to consider pros and cons given pts hx/ and renal bx findings  vein mapping/ vasc consult  -although pt is free of drug use for over a year,  as per her account informed her of responsibilities of  having a AVF on her arm.   start EPO  [employer]  needs percath

## 2021-10-24 NOTE — PROGRESS NOTE ADULT - SUBJECTIVE AND OBJECTIVE BOX
CHIEF COMPLAINT: Sent for elevated creatinine    INTERVAL HX: Underwent dialysis catheter placement 10/22 PM, had HD session thereafter and again yesterday 10/23. Tolerated that well. Feels overall well aside from some generalized weakness and physical deconditioning. She is a bit saddened to here that she will continue to need dialysis for the time being.     ROS: Multisystem review is comprehensively negative x 10 systems except as above    EXAM:  Vital Signs Last 24 Hrs  T(F): 96.8 (24 Oct 2021 08:27), Max: 98.2 (23 Oct 2021 23:35)  HR: 79 (24 Oct 2021 08:27) (79 - 97)  BP: 144/90 (24 Oct 2021 08:27) (130/83 - 144/90)  RR: 18 (24 Oct 2021 08:27) (17 - 18)  SpO2: 98% (24 Oct 2021 08:27) (96% - 100%)    Constitutional: No acute distress  HEENT: NCAT PERRL EOMI, no oral lesions noted  Neck: No lymphadenopathy  Heart: S1 S2 normal, RRR  Lungs: CTA b/l  Abd: +BS Soft NT  Ext: No edema  Skin: No rashes  Musculoskeletal FROM all joints, no active synovitis noted  Neuro: Intact strength upper and lower extremities    LABS:            Quantiferon gold and ANCA reflex MPO and PROT3 ordered for today    CBC 10/23                 8.2    7.85  )-----------( 284                    24.8     BMP 10/23    137  |  104  |  92  ----------------------------<  201  3.9   |   18   |  6.44    Ca 7.6 (Alb 2.2)  Phos 8.7        MICRO:  COVID 19 PCR 10/21: Neg  COVID 19 PCR 10/18: Neg  Bld Cx 10/15: NGTD  GI pathogens PCR 10/14: Neg  C.diff PCR 10/14: Neg  Bld Cx 10/13: Neg  Bld Cx 10/13: S.epi    IMAGING:  CXR 10/16: Frontal expiratory view of the chest shows the heart to be normal in size. The lungs show mild left base atelectasis and there is no evidence of pneumothorax nor pleural effusion.    MRI pelvis 10/13:  A decubitus ulcer is identified along the rightward aspect of the sacrum overlying the S4-coccyx levels. The soft tissue wound measures approximately 39 x 72 mm. The wound extends to the level of the subcutaneous fat and approaches the posterior cortex of the sacrum/coccyx. There is no associated abscess or drainable fluid collection. No increased STIR signal or loss of T1 hyperintense signal to suggest acute osteomyelitis. No acute fracture. No osteonecrosis. No focal muscle edema. Sacroiliac Joints Normal. Lower Lumbar Spine Normal. Moderate to large amount of pelvic free fluid.    CT A/P WO 10/12: Limited evaluation without intravenous contrast.4 mm right lower lobe nodule (2:7). New since previous exam. This is indeterminant. Subtle tree-in-bud type opacities in the right lower lobe almost completely resolved when compared to the previous exam from 7/23/2020. No hydronephrosis. 2 mm non-obstructing left renal mid segment calcification. Ill-defined left para-aortic soft tissue may represent lymphadenopathy. This is very poorly evaluated on this exam. Mildly enlarged bilateral pelvic and inguinal lymph nodes.. Further evaluation is needed contrast-enhanced examination is recommended.    CARDIAC TESTING:  TTE 10/15: Left ventricle is normal in size, wall thickness, wall motion and contractility. Estimated left ventricular ejection fraction is 50-55 %. Mild (1+) mitral regurgitation. Mild (1+) tricuspid valve regurgitation.    MEDS:  MEDICATIONS  (STANDING):  ALBUTerol    90 MICROgram(s) HFA Inhaler 2 Puff(s) Inhalation every 6 hours  amLODIPine   Tablet 10 milliGRAM(s) Oral daily  artificial  tears Solution 1 Drop(s) Both EYES two times a day  calcium acetate 667 milliGRAM(s) Oral three times a day with meals  cloNIDine 0.1 milliGRAM(s) Oral daily  collagenase Ointment 1 Application(s) Topical daily  gabapentin 100 milliGRAM(s) Oral three times a day  heparin   Injectable 5000 Unit(s) SubCutaneous every 12 hours  hydroxychloroquine 200 milliGRAM(s) Oral daily  influenza   Vaccine 0.5 milliLiter(s) IntraMuscular once  methylPREDNISolone sodium succinate Injectable 30 milliGRAM(s) IV Push two times a day  pantoprazole    Tablet 40 milliGRAM(s) Oral before breakfast    MEDICATIONS  (PRN):  acetaminophen   Tablet .. 650 milliGRAM(s) Oral every 6 hours PRN Temp greater or equal to 38C (100.4F), Mild Pain (1 - 3)  ALBUTerol    90 MICROgram(s) HFA Inhaler 1 Puff(s) Inhalation every 6 hours PRN SOB/Wheezing/Congestion  diphenhydrAMINE 25 milliGRAM(s) Oral every 4 hours PRN Rash and/or Itching  loperamide 2 milliGRAM(s) Oral three times a day PRN Diarrhea  melatonin 3 milliGRAM(s) Oral at bedtime PRN Insomnia  ondansetron Injectable 4 milliGRAM(s) IV Push every 8 hours PRN Nausea and/or Vomiting  oxycodone    5 mG/acetaminophen 325 mG 2 Tablet(s) Oral every 6 hours PRN Severe Pain (7 - 10)   CHIEF COMPLAINT: Sent for elevated creatinine    INTERVAL HX: Underwent dialysis catheter placement 10/22 PM, had HD session thereafter and again yesterday 10/23. Tolerated that well. Feels overall well aside from some generalized weakness and physical deconditioning. She is a bit saddened to hear that she will continue to need dialysis for the time being.     ROS: Multisystem review is comprehensively negative x 10 systems except as above    EXAM:  Vital Signs Last 24 Hrs  T(F): 96.8 (24 Oct 2021 08:27), Max: 98.2 (23 Oct 2021 23:35)  HR: 79 (24 Oct 2021 08:27) (79 - 97)  BP: 144/90 (24 Oct 2021 08:27) (130/83 - 144/90)  RR: 18 (24 Oct 2021 08:27) (17 - 18)  SpO2: 98% (24 Oct 2021 08:27) (96% - 100%)    Constitutional: No acute distress  HEENT: NCAT PERRL EOMI, no oral lesions noted  Neck: No lymphadenopathy  Heart: S1 S2 normal, RRR  Lungs: CTA b/l  Abd: +BS Soft NT  Ext: No edema  Skin: No rashes  Musculoskeletal FROM all joints, no active synovitis noted  Neuro: Intact strength upper and lower extremities    LABS:            Quantiferon gold and ANCA reflex MPO and PROT3 ordered for today    CBC 10/23                 8.2    7.85  )-----------( 284                    24.8     BMP 10/23    137  |  104  |  92  ----------------------------<  201  3.9   |   18   |  6.44    Ca 7.6 (Alb 2.2)  Phos 8.7        MICRO:  COVID 19 PCR 10/21: Neg  COVID 19 PCR 10/18: Neg  Bld Cx 10/15: NGTD  GI pathogens PCR 10/14: Neg  C.diff PCR 10/14: Neg  Bld Cx 10/13: Neg  Bld Cx 10/13: S.epi    IMAGING:  CXR 10/16: Frontal expiratory view of the chest shows the heart to be normal in size. The lungs show mild left base atelectasis and there is no evidence of pneumothorax nor pleural effusion.    MRI pelvis 10/13:  A decubitus ulcer is identified along the rightward aspect of the sacrum overlying the S4-coccyx levels. The soft tissue wound measures approximately 39 x 72 mm. The wound extends to the level of the subcutaneous fat and approaches the posterior cortex of the sacrum/coccyx. There is no associated abscess or drainable fluid collection. No increased STIR signal or loss of T1 hyperintense signal to suggest acute osteomyelitis. No acute fracture. No osteonecrosis. No focal muscle edema. Sacroiliac Joints Normal. Lower Lumbar Spine Normal. Moderate to large amount of pelvic free fluid.    CT A/P WO 10/12: Limited evaluation without intravenous contrast.4 mm right lower lobe nodule (2:7). New since previous exam. This is indeterminant. Subtle tree-in-bud type opacities in the right lower lobe almost completely resolved when compared to the previous exam from 7/23/2020. No hydronephrosis. 2 mm non-obstructing left renal mid segment calcification. Ill-defined left para-aortic soft tissue may represent lymphadenopathy. This is very poorly evaluated on this exam. Mildly enlarged bilateral pelvic and inguinal lymph nodes.. Further evaluation is needed contrast-enhanced examination is recommended.    CARDIAC TESTING:  TTE 10/15: Left ventricle is normal in size, wall thickness, wall motion and contractility. Estimated left ventricular ejection fraction is 50-55 %. Mild (1+) mitral regurgitation. Mild (1+) tricuspid valve regurgitation.    MEDS:  MEDICATIONS  (STANDING):  ALBUTerol    90 MICROgram(s) HFA Inhaler 2 Puff(s) Inhalation every 6 hours  amLODIPine   Tablet 10 milliGRAM(s) Oral daily  artificial  tears Solution 1 Drop(s) Both EYES two times a day  calcium acetate 667 milliGRAM(s) Oral three times a day with meals  cloNIDine 0.1 milliGRAM(s) Oral daily  collagenase Ointment 1 Application(s) Topical daily  gabapentin 100 milliGRAM(s) Oral three times a day  heparin   Injectable 5000 Unit(s) SubCutaneous every 12 hours  hydroxychloroquine 200 milliGRAM(s) Oral daily  influenza   Vaccine 0.5 milliLiter(s) IntraMuscular once  methylPREDNISolone sodium succinate Injectable 30 milliGRAM(s) IV Push two times a day  pantoprazole    Tablet 40 milliGRAM(s) Oral before breakfast    MEDICATIONS  (PRN):  acetaminophen   Tablet .. 650 milliGRAM(s) Oral every 6 hours PRN Temp greater or equal to 38C (100.4F), Mild Pain (1 - 3)  ALBUTerol    90 MICROgram(s) HFA Inhaler 1 Puff(s) Inhalation every 6 hours PRN SOB/Wheezing/Congestion  diphenhydrAMINE 25 milliGRAM(s) Oral every 4 hours PRN Rash and/or Itching  loperamide 2 milliGRAM(s) Oral three times a day PRN Diarrhea  melatonin 3 milliGRAM(s) Oral at bedtime PRN Insomnia  ondansetron Injectable 4 milliGRAM(s) IV Push every 8 hours PRN Nausea and/or Vomiting  oxycodone    5 mG/acetaminophen 325 mG 2 Tablet(s) Oral every 6 hours PRN Severe Pain (7 - 10)   CHIEF COMPLAINT: Sent for elevated creatinine    INTERVAL HX: Underwent dialysis catheter placement 10/22 PM, had HD session thereafter and again yesterday 10/23. Tolerated that well. Feels overall well aside from some generalized weakness and physical deconditioning. She is a bit saddened to hear that she will continue to need dialysis for the time being.     ROS: Multisystem review is comprehensively negative x 10 systems except as above    EXAM:  Vital Signs Last 24 Hrs  T(F): 97.6 (24 Oct 2021 15:55), Max: 98.2 (23 Oct 2021 23:35)  HR: 91 (24 Oct 2021 15:55) (79 - 93)  BP: 126/84 (24 Oct 2021 15:55) (126/84 - 144/90)  RR: 18 (24 Oct 2021 15:55) (18 - 18)  SpO2: 96% (24 Oct 2021 15:55) (96% - 98%)    Constitutional: No acute distress  HEENT: NCAT PERRL EOMI, no oral lesions noted  Neck: No lymphadenopathy  Heart: S1 S2 normal, RRR  Lungs: CTA b/l  Abd: +BS Soft NT  Ext: No edema  Skin: No rashes  Musculoskeletal FROM all joints, no active synovitis noted  Neuro: Intact strength upper and lower extremities    LABS:            Quantiferon gold and ANCA reflex MPO and PROT3 ordered for today    CBC 10/23                 8.2    7.85  )-----------( 284                    24.8     BMP 10/23    137  |  104  |  92  ----------------------------<  201  3.9   |   18   |  6.44    Ca 7.6 (Alb 2.2)  Phos 8.7        MICRO:  COVID 19 PCR 10/21: Neg  COVID 19 PCR 10/18: Neg  Bld Cx 10/15: NGTD  GI pathogens PCR 10/14: Neg  C.diff PCR 10/14: Neg  Bld Cx 10/13: Neg  Bld Cx 10/13: S.epi    IMAGING:  CXR 10/16: Frontal expiratory view of the chest shows the heart to be normal in size. The lungs show mild left base atelectasis and there is no evidence of pneumothorax nor pleural effusion.    MRI pelvis 10/13:  A decubitus ulcer is identified along the rightward aspect of the sacrum overlying the S4-coccyx levels. The soft tissue wound measures approximately 39 x 72 mm. The wound extends to the level of the subcutaneous fat and approaches the posterior cortex of the sacrum/coccyx. There is no associated abscess or drainable fluid collection. No increased STIR signal or loss of T1 hyperintense signal to suggest acute osteomyelitis. No acute fracture. No osteonecrosis. No focal muscle edema. Sacroiliac Joints Normal. Lower Lumbar Spine Normal. Moderate to large amount of pelvic free fluid.    CT A/P WO 10/12: Limited evaluation without intravenous contrast.4 mm right lower lobe nodule (2:7). New since previous exam. This is indeterminant. Subtle tree-in-bud type opacities in the right lower lobe almost completely resolved when compared to the previous exam from 7/23/2020. No hydronephrosis. 2 mm non-obstructing left renal mid segment calcification. Ill-defined left para-aortic soft tissue may represent lymphadenopathy. This is very poorly evaluated on this exam. Mildly enlarged bilateral pelvic and inguinal lymph nodes.. Further evaluation is needed contrast-enhanced examination is recommended.    CARDIAC TESTING:  TTE 10/15: Left ventricle is normal in size, wall thickness, wall motion and contractility. Estimated left ventricular ejection fraction is 50-55 %. Mild (1+) mitral regurgitation. Mild (1+) tricuspid valve regurgitation.    MEDS:  MEDICATIONS  (STANDING):  ALBUTerol    90 MICROgram(s) HFA Inhaler 2 Puff(s) Inhalation every 6 hours  amLODIPine   Tablet 10 milliGRAM(s) Oral daily  artificial  tears Solution 1 Drop(s) Both EYES two times a day  calcium acetate 667 milliGRAM(s) Oral three times a day with meals  cloNIDine 0.1 milliGRAM(s) Oral daily  collagenase Ointment 1 Application(s) Topical daily  gabapentin 100 milliGRAM(s) Oral three times a day  heparin   Injectable 5000 Unit(s) SubCutaneous every 12 hours  hydroxychloroquine 200 milliGRAM(s) Oral daily  influenza   Vaccine 0.5 milliLiter(s) IntraMuscular once  methylPREDNISolone sodium succinate Injectable 30 milliGRAM(s) IV Push two times a day  pantoprazole    Tablet 40 milliGRAM(s) Oral before breakfast    MEDICATIONS  (PRN):  acetaminophen   Tablet .. 650 milliGRAM(s) Oral every 6 hours PRN Temp greater or equal to 38C (100.4F), Mild Pain (1 - 3)  ALBUTerol    90 MICROgram(s) HFA Inhaler 1 Puff(s) Inhalation every 6 hours PRN SOB/Wheezing/Congestion  diphenhydrAMINE 25 milliGRAM(s) Oral every 4 hours PRN Rash and/or Itching  loperamide 2 milliGRAM(s) Oral three times a day PRN Diarrhea  melatonin 3 milliGRAM(s) Oral at bedtime PRN Insomnia  ondansetron Injectable 4 milliGRAM(s) IV Push every 8 hours PRN Nausea and/or Vomiting  oxycodone    5 mG/acetaminophen 325 mG 2 Tablet(s) Oral every 6 hours PRN Severe Pain (7 - 10)

## 2021-10-24 NOTE — PHYSICAL THERAPY INITIAL EVALUATION ADULT - ADL SKILLS, REHAB EVAL
pt is L hand dominant ,has very limited mobility R hand/wrist due to RA/prior infection/?surgery/independent

## 2021-10-24 NOTE — PHYSICAL THERAPY INITIAL EVALUATION ADULT - DIAGNOSIS, PT EVAL
Acute Renal Failure, +SLE, ?ANCA vasculitis ,Rheumatoid Arthritis Acute Renal Failure, +SLE, ?ANCA vasculitis ,Rheumatoid Arthritis,sacral decubitus ulcer, UTI(+enterobacter clocae complex) Acute Renal Failure, +SLE, ?ANCA vasculitis ,Rheumatoid Arthritis, sacral decubitus ulcer, UTI(+enterobacter clocae complex)

## 2021-10-24 NOTE — PHYSICAL THERAPY INITIAL EVALUATION ADULT - PERTINENT HX OF CURRENT PROBLEM, REHAB EVAL
acute renal failure, elevated Creatinie,proteinuria acute renal failure, elevated Creatinine, proteinuria

## 2021-10-24 NOTE — PROGRESS NOTE ADULT - ASSESSMENT
40 y/o woman with hx of RA and SLE w/ Rheum, Dr. Del Rosario coming in w/ high Creat w/ Proteinuria > 7gm w/ Hematuria w/ low C3/C4 w/ UTI.    -DSDNA elevated, SSA, SSB+, complements low, +P-ANCA.  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  -Patient had second HD, tolerated well  - Discussed case with Dr. Pena. She has completed 3 days of high dose steroid therapy.  Lowered steroids back down to 1mg/kg daily starting on 10/24/21.  Will discuss further potential immunosuppressive therapy (Cytoxan, rituxan, etc.) with Dr. Pena.  ?risk/benefit analysis in this patient who has hx of heroin abuse (Utox + for cocaine and THC here) and has had severe infectious history including sepsis, respiratory failure, sacral decubitus.  Would also need input from ID regarding same.  -ANCAs repeated for trend  -Monitor glucose, maintain PPI while on steroid  -continue HCQ 200mg daily, closer to wt based   will follow     40 y/o woman with hx of RA and SLE w/ Rheum, Dr. Del Rosario coming in w/ high Creat w/ Proteinuria > 7gm w/ Hematuria w/ low C3/C4 w/ UTI.    -DSDNA elevated, SSA, SSB+, complements low, +P-ANCA.  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  -Patient had second HD, tolerated well  - Discussed case with Dr. Pena. She has completed 3 days of high dose steroid therapy.  Lowered steroids back down to 1mg/kg daily starting on 10/24/21.  Will discuss further potential immunosuppressive therapy (Cytoxan, rituxan, etc.) with Dr. Pena.  ?risk/benefit analysis in this patient who has hx of heroin abuse (Utox + for cocaine and THC here) and has had severe infectious history including sepsis, respiratory failure, sacral decubitus.  Would also need input from ID regarding same.  -ANCAs repeated for trend  -Monitor glucose, maintain PPI while on steroid  -continue HCQ 200mg daily, closer to wt based   will follow    Jesi Brower to take over rheum service starting 10/25/21

## 2021-10-24 NOTE — PHYSICAL THERAPY INITIAL EVALUATION ADULT - GAIT DEVIATIONS NOTED, PT EVAL
L knee contracted in FLEXION,ambulates on ball of L foot due to functional leg length discrepancy/decreased step length/decreased stride length/decreased weight-shifting ability

## 2021-10-24 NOTE — PROGRESS NOTE ADULT - SUBJECTIVE AND OBJECTIVE BOX
39 female with anemia, HTN, RA, SLE on hydroxychloroquine was told to go to ED for evaluation of elevated creatinine  Cr 10- was 5.8 where baseline < 1.0. Patient with a recent admit for NATALIA with peak creatinine in the 2's, seen by Dr Pena. Has a history of IVDA as well. Patient reports seeing rheum/labs in january and missed last appt. She reports going to Dr Rogers for most recent lab draw and sent in for the results. She reports going to this appt as she thought it odd that she had pink urine. Uses meloxicam 15 mg qd, has used aleve x 2 tabs but usually supplements w acetaminophen.    10/23  second dialysis doing well  has profuse HD as per pt  otherwise comfortable  vvs  Labs noted PR3/ MPO levels    10/24  d/w Rheum re Rituxan  will need to consider pros and cons given pts hx/ and renal bx findings  vein mapping/ vasc consult  although pt is free of drug use for over a year,  as per her account informed her of responsibilities of  having a AVF on her arm.       PAST MEDICAL & SURGICAL HISTORY:  Lupus    Rheumatoid arthritis    H/O Raynaud&#x27;s syndrome    Heroin abuse    Smoker    Rheumatoid arthritis    Sepsis    HTN (hypertension)    COPD (chronic obstructive pulmonary disease)    Depression    Epilepsy    GERD (gastroesophageal reflux disease)    Raynaud&#x27;s syndrome without gangrene    Bacteremia    Systemic lupus erythematosus    Aphonia    H/O tubal ligation    H/O appendicitis    Gall bladder stones    H/O tracheostomy    S/P percutaneous endoscopic gastrostomy (PEG) tube placement      MEDICATIONS  (STANDING):  ALBUTerol    90 MICROgram(s) HFA Inhaler 2 Puff(s) Inhalation every 6 hours  amLODIPine   Tablet 10 milliGRAM(s) Oral daily  artificial  tears Solution 1 Drop(s) Both EYES two times a day  calcium acetate 667 milliGRAM(s) Oral three times a day with meals  cloNIDine 0.1 milliGRAM(s) Oral daily  collagenase Ointment 1 Application(s) Topical daily  gabapentin 100 milliGRAM(s) Oral three times a day  heparin   Injectable 5000 Unit(s) SubCutaneous every 12 hours  hydroxychloroquine 200 milliGRAM(s) Oral daily  influenza   Vaccine 0.5 milliLiter(s) IntraMuscular once  methylPREDNISolone sodium succinate Injectable 30 milliGRAM(s) IV Push two times a day  pantoprazole    Tablet 40 milliGRAM(s) Oral before breakfast    MEDICATIONS  (PRN):  acetaminophen   Tablet .. 650 milliGRAM(s) Oral every 6 hours PRN Temp greater or equal to 38C (100.4F), Mild Pain (1 - 3)  ALBUTerol    90 MICROgram(s) HFA Inhaler 1 Puff(s) Inhalation every 6 hours PRN SOB/Wheezing/Congestion  diphenhydrAMINE 25 milliGRAM(s) Oral every 4 hours PRN Rash and/or Itching  loperamide 2 milliGRAM(s) Oral three times a day PRN Diarrhea  melatonin 3 milliGRAM(s) Oral at bedtime PRN Insomnia  ondansetron Injectable 4 milliGRAM(s) IV Push every 8 hours PRN Nausea and/or Vomiting  oxycodone    5 mG/acetaminophen 325 mG 2 Tablet(s) Oral every 6 hours PRN Severe Pain (7 - 10)      Allergies    morphine (Rash)  morphine (Unknown)    Intolerances        SOCIAL HISTORY:  no etoh now    FAMILY HISTORY:  No pertinent family history in first degree relatives  cannot recall family history at this time      REVIEW OF SYSTEMS:    CONSTITUTIONAL: stable weakness, fevers or chills  EYES/ENT: No visual changes;  No vertigo or throat pain   NECK: No pain or stiffness  RESPIRATORY: No cough, wheezing, hemoptysis; No shortness of breath  CARDIOVASCULAR: No chest pain or palpitations  GASTROINTESTINAL: No abdominal or epigastric pain. No nausea, vomiting, or hematemesis; No diarrhea or constipation. No melena or hematochezia.  GENITOURINARY: No dysuria, frequency or hematuria  NEUROLOGICAL: No numbness or weakness  SKIN: No itching, burning, rashes, or lesions   All other review of systems is negative unless indicated above.    Vital Signs Last 24 Hrs  T(C): 36.4 (24 Oct 2021 15:55), Max: 36.8 (23 Oct 2021 23:35)  T(F): 97.6 (24 Oct 2021 15:55), Max: 98.2 (23 Oct 2021 23:35)  HR: 91 (24 Oct 2021 15:55) (79 - 93)  BP: 126/84 (24 Oct 2021 15:55) (126/84 - 144/90)  BP(mean): --  RR: 18 (24 Oct 2021 15:55) (18 - 18)  SpO2: 96% (24 Oct 2021 15:55) (96% - 98%)  PHYSICAL EXAM:    Constitutional: NAD, frail  HEENT: dry MM  Neck: No LAD, No JVD  Respiratory: dist  Cardiovascular: S1 and S2  Gastrointestinal: BS+, soft  Extremities: No peripheral edema, le contorted  Neurological: A/O x 3        LABS:                              8.2    7.85  )-----------( 284      ( 23 Oct 2021 08:45 )             24.8                           6.6    4.22  )-----------( 82       ( 12 Oct 2021 09:54 )             20.0     10    137  |  104  |  92<H>  ----------------------------<  201<H>  3.9   |  18<L>  |  6.44<H>    Ca    7.6<L>      23 Oct 2021 08:45  Phos  8.7     10-    TPro  x   /  Alb  2.2<L>  /  TBili  x   /  DBili  x   /  AST  x   /  ALT  x   /  AlkPhos  x   10-23      10-23    137  |  104  |  92<H>  ----------------------------<  201<H>  3.9   |  18<L>  |  6.44<H>    Ca    7.6<L>      23 Oct 2021 08:45  Phos  8.7     10-23    TPro  x   /  Alb  2.2<L>  /  TBili  x   /  DBili  x   /  AST  x   /  ALT  x   /  AlkPhos  x   10-23      12 Oct 2021 09:54    134    |  104    |  106    ----------------------------<  108    4.5     |  15     |  6.51   11 Oct 2021 22:25    133    |  101    |  97     ----------------------------<  89     3.9     |  17     |  6.72     Ca    7.0        12 Oct 2021 09:54  Ca    7.3        11 Oct 2021 22:25  Phos  7.7       12 Oct 2021 09:54    TPro  7.4    /  Alb  2.5    /  TBili  0.2    /  DBili  x      /  AST  23     /  ALT  13     /  AlkPhos  60     11 Oct 2021 22:25      Creatine Kinase, Serum: 142 U/L [26 - 192] (10-11 @ 22:25)      Urine Studies:  Urinalysis Basic - ( 11 Oct 2021 22:25 )    Color: Yellow / Appearance: Slightly Turbid / S.015 / pH: x  Gluc: x / Ketone: Negative  / Bili: Negative / Urobili: Negative mg/dL   Blood: x / Protein: 500 mg/dL / Nitrite: Negative   Leuk Esterase: Moderate / RBC: >50 /HPF / WBC 26-50   Sq Epi: x / Non Sq Epi: Occasional / Bacteria: TNTC            RADIOLOGY & ADDITIONAL STUDIES:

## 2021-10-24 NOTE — PROGRESS NOTE ADULT - SUBJECTIVE AND OBJECTIVE BOX
CHIEF COMPLAINT:    SUBJECTIVE:  No new complaints    REVIEW OF SYSTEMS:    Negative except as above    Vital Signs Last 24 Hrs  T(C): 36 (24 Oct 2021 08:27), Max: 36.8 (23 Oct 2021 23:35)  T(F): 96.8 (24 Oct 2021 08:27), Max: 98.2 (23 Oct 2021 23:35)  HR: 79 (24 Oct 2021 08:27) (79 - 97)  BP: 144/90 (24 Oct 2021 08:27) (130/83 - 144/90)  BP(mean): --  RR: 18 (24 Oct 2021 08:27) (17 - 18)  SpO2: 98% (24 Oct 2021 08:27) (96% - 100%)    I&O's Summary    23 Oct 2021 07:01  -  24 Oct 2021 07:00  --------------------------------------------------------  IN: 500 mL / OUT: 1500 mL / NET: -1000 mL        CAPILLARY BLOOD GLUCOSE          PHYSICAL EXAM:    Constitutional: NAD, awake and alert, well-developed  HEENT- no oral lesions noted, no lymphadenoapthy noted  Heart- S1 S2 reg  Lungs- CTA b/l  Abd- Soft NT  Ext- +edema LE  Skin- no rashes  Musculoskelatal- FROM all joints, no active synovitis noted  Neurological- intact strength upper and lower extremities      MEDICATIONS:  MEDICATIONS  (STANDING):  ALBUTerol    90 MICROgram(s) HFA Inhaler 2 Puff(s) Inhalation every 6 hours  amLODIPine   Tablet 10 milliGRAM(s) Oral daily  artificial  tears Solution 1 Drop(s) Both EYES two times a day  calcium acetate 667 milliGRAM(s) Oral three times a day with meals  cloNIDine 0.1 milliGRAM(s) Oral daily  collagenase Ointment 1 Application(s) Topical daily  gabapentin 100 milliGRAM(s) Oral three times a day  heparin   Injectable 5000 Unit(s) SubCutaneous every 12 hours  hydroxychloroquine 200 milliGRAM(s) Oral daily  influenza   Vaccine 0.5 milliLiter(s) IntraMuscular once  methylPREDNISolone sodium succinate Injectable 30 milliGRAM(s) IV Push two times a day  pantoprazole    Tablet 40 milliGRAM(s) Oral before breakfast      LABS: All Labs Reviewed:                        8.2    7.85  )-----------( 284      ( 23 Oct 2021 08:45 )             24.8     10-23    137  |  104  |  92<H>  ----------------------------<  201<H>  3.9   |  18<L>  |  6.44<H>    Ca    7.6<L>      23 Oct 2021 08:45  Phos  8.7     10-23    TPro  x   /  Alb  2.2<L>  /  TBili  x   /  DBili  x   /  AST  x   /  ALT  x   /  AlkPhos  x   10-23          Blood Culture:     RADIOLOGY/EKG:    A/P:

## 2021-10-24 NOTE — PROGRESS NOTE ADULT - ASSESSMENT
41 year-old woman with HTN, COPD, depression, RA and SLE, sent for further evaluation after noted to have elevated Cr, hematuria, proteinuria > 7 grams, low C3/C4.    Acute renal failure in setting of RA, SLE  DS DNA elevated, SSA, SSB+, complements low, +P-ANCA. 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 Completed 3 days of high dose steroid therapy. Underwent dialysis catheter placement 10/22, had HD session thereafter and again today 10/23. Tolerated that well. Feels overall well aside from some generalized weakness and physical deconditioning.   - Lowered steroids back down to 1mg/kg daily starting today 10/24.   - Will discuss further immunosuppressive therapy (Cytoxan, rituxan) with Renal and ID  - Will repeat ANCAs for trend, ordered  - Monitor glucose, maintain PPI while on steroid  - Continue HCQ 200mg daily, closer to wt based   - Consulted Vascular to assess with more long term dialysis access planning  - Consulted  / SW to assist with outpatient dialysis placement    HTN  BP controlled.   - On Norvasc    COPD  Breathing comfortably.   - On albuterol prn    Severe protein calorie malnutrition  Appreciate input from dietician  - Continue renal diet, trend weights          Diet: Renal diet  DVT px: Heparin subcut  Emergency contact: Blake Moses, brother [phone]  Dispo: Anticipate DC to home when clinically improved, may need arrangements for outpatient HD center, will discuss with Nephrology, , Social Worder, Dialysis Social Worker 41 year-old woman with HTN, COPD, depression, RA and SLE, sent for further evaluation after noted to have elevated Cr, hematuria, proteinuria > 7 grams, low C3/C4.    Acute renal failure in setting of RA, SLE  DS DNA elevated, SSA, SSB+, complements low, +P-ANCA. 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 Completed 3 days of high dose steroid therapy. Underwent dialysis catheter placement 10/22, had HD session thereafter and again 10/23. Tolerated that well. Feels overall well aside from some generalized weakness and physical deconditioning.   - Lowered steroids back down to 1mg/kg daily starting today 10/24.   - Will discuss further immunosuppressive therapy (Cytoxan, rituxan) with Renal and ID  - Will repeat ANCAs for trend, ordered  - Monitor glucose, maintain PPI while on steroid  - Continue HCQ 200mg daily, closer to wt based   - Consulted Vascular to assess with more long term dialysis access planning  - Consulted  / SW to assist with outpatient dialysis placement    HTN  BP controlled.   - On Norvasc    COPD  Breathing comfortably.   - On albuterol prn    Severe protein calorie malnutrition  Appreciate input from dietician  - Continue renal diet, trend weights          Diet: Renal diet  DVT px: Heparin subcut  Emergency contact: Blake Moses, brother [phone]  Dispo: Anticipate DC to home when clinically improved, may need arrangements for outpatient HD center, will discuss with Nephrology, , Social Worder, Dialysis Social Worker

## 2021-10-25 LAB
ADD ON TEST-SPECIMEN IN LAB: SIGNIFICANT CHANGE UP
ALBUMIN SERPL ELPH-MCNC: 2.6 G/DL — LOW (ref 3.3–5)
ALP SERPL-CCNC: 58 U/L — SIGNIFICANT CHANGE UP (ref 40–120)
ALT FLD-CCNC: 25 U/L — SIGNIFICANT CHANGE UP (ref 12–78)
ANION GAP SERPL CALC-SCNC: 13 MMOL/L — SIGNIFICANT CHANGE UP (ref 5–17)
APTT BLD: 24.7 SEC — LOW (ref 27.5–35.5)
AST SERPL-CCNC: 22 U/L — SIGNIFICANT CHANGE UP (ref 15–37)
BASOPHILS # BLD AUTO: 0.01 K/UL — SIGNIFICANT CHANGE UP (ref 0–0.2)
BASOPHILS NFR BLD AUTO: 0.1 % — SIGNIFICANT CHANGE UP (ref 0–2)
BILIRUB DIRECT SERPL-MCNC: <0.1 MG/DL — SIGNIFICANT CHANGE UP (ref 0–0.2)
BILIRUB INDIRECT FLD-MCNC: >0.1 MG/DL — LOW (ref 0.2–1)
BILIRUB SERPL-MCNC: 0.2 MG/DL — SIGNIFICANT CHANGE UP (ref 0.2–1.2)
BUN SERPL-MCNC: 94 MG/DL — HIGH (ref 7–23)
CALCIUM SERPL-MCNC: 7.8 MG/DL — LOW (ref 8.5–10.1)
CHLORIDE SERPL-SCNC: 103 MMOL/L — SIGNIFICANT CHANGE UP (ref 96–108)
CO2 SERPL-SCNC: 20 MMOL/L — LOW (ref 22–31)
CREAT SERPL-MCNC: 5.56 MG/DL — HIGH (ref 0.5–1.3)
DSDNA AB SER-ACNC: 181 IU/ML — HIGH
EOSINOPHIL # BLD AUTO: 0 K/UL — SIGNIFICANT CHANGE UP (ref 0–0.5)
EOSINOPHIL NFR BLD AUTO: 0 % — SIGNIFICANT CHANGE UP (ref 0–6)
ERYTHROCYTE [SEDIMENTATION RATE] IN BLOOD: 33 MM/HR — HIGH (ref 0–15)
FERRITIN SERPL-MCNC: 1315 NG/ML — HIGH (ref 15–150)
GLUCOSE SERPL-MCNC: 105 MG/DL — HIGH (ref 70–99)
HCT VFR BLD CALC: 25.6 % — LOW (ref 34.5–45)
HGB BLD-MCNC: 8.6 G/DL — LOW (ref 11.5–15.5)
IMM GRANULOCYTES NFR BLD AUTO: 4.3 % — HIGH (ref 0–1.5)
INR BLD: 0.92 RATIO — SIGNIFICANT CHANGE UP (ref 0.88–1.16)
IRON SATN MFR SERPL: 172 UG/DL — HIGH (ref 30–160)
IRON SATN MFR SERPL: 80 % — HIGH (ref 14–50)
LYMPHOCYTES # BLD AUTO: 0.7 K/UL — LOW (ref 1–3.3)
LYMPHOCYTES # BLD AUTO: 6.5 % — LOW (ref 13–44)
MCHC RBC-ENTMCNC: 27.8 PG — SIGNIFICANT CHANGE UP (ref 27–34)
MCHC RBC-ENTMCNC: 33.6 GM/DL — SIGNIFICANT CHANGE UP (ref 32–36)
MCV RBC AUTO: 82.8 FL — SIGNIFICANT CHANGE UP (ref 80–100)
MONOCYTES # BLD AUTO: 0.78 K/UL — SIGNIFICANT CHANGE UP (ref 0–0.9)
MONOCYTES NFR BLD AUTO: 7.2 % — SIGNIFICANT CHANGE UP (ref 2–14)
NEUTROPHILS # BLD AUTO: 8.89 K/UL — HIGH (ref 1.8–7.4)
NEUTROPHILS NFR BLD AUTO: 81.9 % — HIGH (ref 43–77)
PLATELET # BLD AUTO: 428 K/UL — HIGH (ref 150–400)
POTASSIUM SERPL-MCNC: 4 MMOL/L — SIGNIFICANT CHANGE UP (ref 3.5–5.3)
POTASSIUM SERPL-SCNC: 4 MMOL/L — SIGNIFICANT CHANGE UP (ref 3.5–5.3)
PROT SERPL-MCNC: 6.8 GM/DL — SIGNIFICANT CHANGE UP (ref 6–8.3)
PROTHROM AB SERPL-ACNC: 10.8 SEC — SIGNIFICANT CHANGE UP (ref 10.6–13.6)
RBC # BLD: 3.09 M/UL — LOW (ref 3.8–5.2)
RBC # FLD: 14.7 % — HIGH (ref 10.3–14.5)
SARS-COV-2 RNA SPEC QL NAA+PROBE: SIGNIFICANT CHANGE UP
SODIUM SERPL-SCNC: 136 MMOL/L — SIGNIFICANT CHANGE UP (ref 135–145)
TIBC SERPL-MCNC: 215 UG/DL — LOW (ref 220–430)
UIBC SERPL-MCNC: 42 UG/DL — LOW (ref 110–370)
WBC # BLD: 10.85 K/UL — HIGH (ref 3.8–10.5)
WBC # FLD AUTO: 10.85 K/UL — HIGH (ref 3.8–10.5)

## 2021-10-25 PROCEDURE — 93970 EXTREMITY STUDY: CPT | Mod: 26

## 2021-10-25 PROCEDURE — 99233 SBSQ HOSP IP/OBS HIGH 50: CPT

## 2021-10-25 PROCEDURE — 71250 CT THORAX DX C-: CPT | Mod: 26

## 2021-10-25 RX ORDER — ATOVAQUONE 750 MG/5ML
1500 SUSPENSION ORAL DAILY
Refills: 0 | Status: DISCONTINUED | OUTPATIENT
Start: 2021-10-25 | End: 2021-10-27

## 2021-10-25 RX ORDER — CALCIUM ACETATE 667 MG
1334 TABLET ORAL
Refills: 0 | Status: DISCONTINUED | OUTPATIENT
Start: 2021-10-25 | End: 2021-10-27

## 2021-10-25 RX ADMIN — Medication 30 MILLIGRAM(S): at 20:58

## 2021-10-25 RX ADMIN — Medication 1 APPLICATION(S): at 09:22

## 2021-10-25 RX ADMIN — OXYCODONE AND ACETAMINOPHEN 2 TABLET(S): 5; 325 TABLET ORAL at 10:20

## 2021-10-25 RX ADMIN — ALBUTEROL 2 PUFF(S): 90 AEROSOL, METERED ORAL at 08:29

## 2021-10-25 RX ADMIN — Medication 1 DROP(S): at 09:24

## 2021-10-25 RX ADMIN — Medication 200 MILLIGRAM(S): at 09:22

## 2021-10-25 RX ADMIN — Medication 2 MILLIGRAM(S): at 09:22

## 2021-10-25 RX ADMIN — Medication 3 MILLIGRAM(S): at 20:56

## 2021-10-25 RX ADMIN — Medication 667 MILLIGRAM(S): at 07:31

## 2021-10-25 RX ADMIN — AMLODIPINE BESYLATE 10 MILLIGRAM(S): 2.5 TABLET ORAL at 09:23

## 2021-10-25 RX ADMIN — Medication 30 MILLIGRAM(S): at 09:23

## 2021-10-25 RX ADMIN — Medication 667 MILLIGRAM(S): at 12:22

## 2021-10-25 RX ADMIN — Medication 1 DROP(S): at 22:00

## 2021-10-25 RX ADMIN — HEPARIN SODIUM 5000 UNIT(S): 5000 INJECTION INTRAVENOUS; SUBCUTANEOUS at 20:57

## 2021-10-25 RX ADMIN — Medication 0.1 MILLIGRAM(S): at 09:35

## 2021-10-25 RX ADMIN — Medication 1334 MILLIGRAM(S): at 18:55

## 2021-10-25 RX ADMIN — ERYTHROPOIETIN 6000 UNIT(S): 10000 INJECTION, SOLUTION INTRAVENOUS; SUBCUTANEOUS at 17:02

## 2021-10-25 RX ADMIN — GABAPENTIN 100 MILLIGRAM(S): 400 CAPSULE ORAL at 20:56

## 2021-10-25 RX ADMIN — HEPARIN SODIUM 5000 UNIT(S): 5000 INJECTION INTRAVENOUS; SUBCUTANEOUS at 09:22

## 2021-10-25 RX ADMIN — ALBUTEROL 2 PUFF(S): 90 AEROSOL, METERED ORAL at 20:42

## 2021-10-25 RX ADMIN — OXYCODONE AND ACETAMINOPHEN 2 TABLET(S): 5; 325 TABLET ORAL at 18:54

## 2021-10-25 RX ADMIN — OXYCODONE AND ACETAMINOPHEN 2 TABLET(S): 5; 325 TABLET ORAL at 09:23

## 2021-10-25 RX ADMIN — GABAPENTIN 100 MILLIGRAM(S): 400 CAPSULE ORAL at 05:49

## 2021-10-25 RX ADMIN — PANTOPRAZOLE SODIUM 40 MILLIGRAM(S): 20 TABLET, DELAYED RELEASE ORAL at 05:49

## 2021-10-25 RX ADMIN — GABAPENTIN 100 MILLIGRAM(S): 400 CAPSULE ORAL at 13:39

## 2021-10-25 NOTE — CONSULT NOTE ADULT - ASSESSMENT
39 female with anemia, HTN, RA, SLE on hydroxychloroquine was told to go to ED for evaluation of elevated creatinine  Cr 10- was 5.8 where baseline < 1.0.     NATALIA  -Pre-renal/NSAID related , other consideration is lupus related  -IVF, convert to bicarb based  -NO acute need for HD  -Lupus serology  -Treatment of underlying UTI    Acidosis  -COnversion to bicarb based ivf      Hyperphos  -Will need binders if not stabilizing with hydration    Anemia  Managment per medicine    thanks, will follow  d/c with RN staff, Dr Cornejo  Called Dr. Mckeon for discussion, she is unavailable and no RN available to go over labs
Chronic Anemia of Chronic disease  Acute on CKD  BRBPR -small amount today  H/O NSAID use  REC  Conservative treatment for now unless there is a recurrence of bleeding  EGD /Colonoscopy  after stabilization of Acute Renal failure-may be done as outpatient 
40yo F with NATALIA requiring dialysis, needs conversion of nontunneled to tunneled catheter  - Covid pcr out of range  - As per Dr. Armstrong, pt will be discharged soon  - Consentable  - Keep pt NPO after midnight for procedure tomorrow  - Repeat COVID PCR, AM labs, hold AM Heparin
41 y/o female with h/o anemia, HTN, RA, SLE on hydroxychloroquine, OA was admitted on 10/12 for elevated creatinine. Cr 10- was 5.8 where baseline < 1.0. She has b/l knees pain. She has knees and shoulders and wrists; not able to use crutches or a walker so has been using a wheelchair. Noted with coccyx decubitus. Concern for underlying infection was raised. She received ceftriaxone and doxycycline.     1. Low grade fever. Sacral decubitus, unstageable ulcer. ARF.   -no surrounding cellulitis  -cannot exclude underlying bone infection  -no clinical signs of sepsis  -consider MRI sacrum to better evaluate underlying bone  -on ceftriaxone IV and doxycycline PO  -reason for abx use and side effects reviewed with patient; monitor BMP  -local wound care  -old chart reviewed to assess prior cultures  -monitor temps  -f/u CBC  -supportive care  2. Other issues:   -care per medicine      
41yo F with NATALIA of unknown etiology referred to IR for possible shiley placement  - Renal function progressively worsening   - Covid out of range  - Discussed case with Dr. Mcmullen, Dr. Pereira. F/U AM labs, if renal function continues to decline ,plan for Shiley placement.

## 2021-10-25 NOTE — CONSULT NOTE ADULT - CONSULT REQUESTED DATE/TIME
12-Oct-2021 12:34
14-Oct-2021
13-Oct-2021 09:06
12-Oct-2021 19:38
25-Oct-2021 11:46
21-Oct-2021 12:59

## 2021-10-25 NOTE — CONSULT NOTE ADULT - REASON FOR ADMISSION
UTI  acute renal failure

## 2021-10-25 NOTE — PROGRESS NOTE ADULT - ASSESSMENT
39 female with anemia, HTN, RA, SLE on hydroxychloroquine was told to go to ED for evaluation of elevated creatinine  Cr 10- was 5.8 where baseline < 1.0.     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   + MPO ANCA vasculitits    - daily labs   - arrange for HD today and then MWF   - plan for permacath tomorrow   - if no recovery then AVF as pt is known drug use   - risks of continued drug use advised to pt  - iV steroids per Rheum  - d/w NP stamatos - in light of risk of no renal recovery and permanent HD -  attempt recovery with Rtiuxan  - start PCP prophylaxsis - Mepron po daily   - SW to arrange for outpatient HD at [facility] and monitor for recovery as outpatient     Hyperphos  - increased CaAcetate dose     Anemia   TAMARA at HD    [employer]    d.w RN    OLIVIA Brower         39 female with anemia, HTN, RA, SLE on hydroxychloroquine was told to go to ED for evaluation of elevated creatinine  Cr 10- was 5.8 where baseline < 1.0.     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   + MPO ANCA vasculitits    - daily labs   - arrange for HD today and then MWF   - plan for permacath tomorrow   - if no recovery then AVF as pt is known drug use   - risks of continued drug use advised to pt  - iV steroids per Rheum  -  need to weigh risks vs benenfits of permanent HD vs increased infection risk w Rituxan      - await repeat serologies and further input from Rheum   - trend labs daily   - start PCP prophylaxsis - Mepron po daily while on steroids     - SW aware of possible DC with permacath and need for HD outpatient - nearest HD unit [facility] [place]        Hyperphos  - increased CaAcetate dose     Anemia   TAMARA at HD    [employer]    erlinda Brower         39 female with anemia, HTN, RA, SLE on hydroxychloroquine was told to go to ED for evaluation of elevated creatinine  Cr 10- was 5.8 where baseline < 1.0.     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 )    - daily labs   - arrange for HD today and then MWF   - plan for permacath tomorrow   - if no recovery then AVF as pt is known drug use   - risks of continued drug use advised to pt  - iV steroids per Rheum  -  need to weigh risks vs benenfits of permanent HD vs increased infection risk w Rituxan      - await repeat serologies and further input from Rheum   - trend labs daily   - start PCP prophylaxsis - Mepron po daily while on steroids     - SW aware of possible DC with permacath and need for HD outpatient - nearest HD unit [facility] [place]        Hyperphos  - increased CaAcetate dose     Anemia   TAMARA at HD    [employer]    erlinda Brower         39 female with anemia, HTN, RA, SLE on hydroxychloroquine was told to go to ED for evaluation of elevated creatinine  Cr 10- was 5.8 where baseline < 1.0.     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 )    - daily labs   - arrange for HD today and then MWF   - plan for permacath tomorrow w IR while waiting for renal fxn to possible recover  - if no recovery then AVF as pt is known drug use and long term permacath is not recommeneded   - risks of continued drug use advised to pt  - iV steroids per Rheum  -  need to weigh risks vs benenfits of permanent HD vs increased infection risk w Rituxan      - await repeat serologies and further input from Rheum   - trend labs daily   - start PCP prophylaxsis - Mepron po daily while on steroids     - SW aware of possible DC with permacath and need for HD outpatient - nearest HD unit [facility] [place]        Hyperphos  - increased CaAcetate dose     Anemia   TAMARA at HD    [employer]    erlinda Brower

## 2021-10-25 NOTE — CONSULT NOTE ADULT - SUBJECTIVE AND OBJECTIVE BOX
HPI:  40 year old female w hx anemia, HTN, RA, SLE on hydroxychloroquine was told to go to ED for evaluation of elevated creatinine    Cr 10- was 5.8 where baseline < 1.0    Has followed up w rheum q 3 months  mobility has changed given bone on bone w knees and shoulders and wrists; not able to use crutches or a walker so has been using a wheelchair    Uses meloxicam 15 mg qd  last week she used aleve x 2 tabs but usually supplements w acetaminophen    due to chronic pain she was to start gabapentin  she also just picked up her prescription for a new antihypertenive, lisinopril she has not started yet    In ED /109   HR 88    RR 18    T98.2  100% sat RA  Cr 6.72  given 30 cc/kg NS 1500 cc  abn UA w pyuria and microscopic hematuria  ceftriaxone  CT abd/pelvis pending      PAST MEDICAL HX  Aphonia hx  Bacteremia   COPD (chronic obstructive pulmonary disease)   Depression   Epilepsy   GERD (gastroesophageal reflux disease)    Heroin abuse history  HTN (hypertension)  Hx intubation w acute hypoxic resp failure, asp    Hx MRSA  Raynaud's syndrome without gangrene   RA rheumatoid arthritis   Sepsis   Smoker   SLE systemic lupus erythematosus.     PAST SURGICAL HX  Gall bladder stones   H/O appendicitis   H/O tracheostomy 07-  H/O tubal ligation   S/P percutaneous endoscopic gastrostomy (PEG) tube placement.07-     FAMILY HX  Heart Disease: Mother  Father: unknown  No Family Hx of: diagnosed with Diabetes, Cancer, Hypertension,   Stroke, Mental Illness    SOCIAL HX  former smoker  no alcohol  once a month marijuana (12 Oct 2021 02:15)      PAST MEDICAL & SURGICAL HISTORY:  Lupus    Rheumatoid arthritis    H/O Raynaud&#x27;s syndrome    Heroin abuse    Smoker    Rheumatoid arthritis    Sepsis    HTN (hypertension)    COPD (chronic obstructive pulmonary disease)    Depression    Epilepsy    GERD (gastroesophageal reflux disease)    Raynaud&#x27;s syndrome without gangrene    Bacteremia    Systemic lupus erythematosus    Aphonia    H/O tubal ligation    H/O appendicitis    Gall bladder stones    H/O tracheostomy    S/P percutaneous endoscopic gastrostomy (PEG) tube placement        MEDICATIONS  (STANDING):  cefTRIAXone Injectable. 1000 milliGRAM(s) IV Push every 24 hours  collagenase Ointment 1 Application(s) Topical daily  dextrose 5% 1000 milliLiter(s) (100 mL/Hr) IV Continuous <Continuous>  doxycycline hyclate Capsule 100 milliGRAM(s) Oral every 12 hours  gabapentin 100 milliGRAM(s) Oral three times a day  hydroxychloroquine 200 milliGRAM(s) Oral daily  influenza   Vaccine 0.5 milliLiter(s) IntraMuscular once  pantoprazole Infusion 8 mG/Hr (10 mL/Hr) IV Continuous <Continuous>    MEDICATIONS  (PRN):  acetaminophen   Tablet .. 650 milliGRAM(s) Oral every 6 hours PRN Temp greater or equal to 38C (100.4F), Mild Pain (1 - 3)  cloNIDine 0.1 milliGRAM(s) Oral every 8 hours PRN BP sys > 140 or diastolic > 100  melatonin 3 milliGRAM(s) Oral at bedtime PRN Insomnia  ondansetron Injectable 4 milliGRAM(s) IV Push every 8 hours PRN Nausea and/or Vomiting      Allergies    morphine (Rash)  morphine (Unknown)    Intolerances        SOCIAL HISTORY:    FAMILY HISTORY:  No pertinent family history in first degree relatives  cannot recall family history at this time     Non-contributory    REVIEW OF SYSTEMS      General:	    Respiratory and Thorax:  	  Cardiovascular:	    Gastrointestinal:	    Musculoskeletal:	   Vital Signs Last 24 Hrs  T(C): 36.9 (12 Oct 2021 17:59), Max: 37.7 (12 Oct 2021 13:33)  T(F): 98.4 (12 Oct 2021 17:59), Max: 99.9 (12 Oct 2021 13:33)  HR: 91 (12 Oct 2021 19:08) (78 - 91)  BP: 149/101 (12 Oct 2021 19:08) (147/92 - 175/101)  BP(mean): 127 (11 Oct 2021 21:11) (127 - 127)  RR: 16 (12 Oct 2021 17:59) (15 - 18)  SpO2: 100% (12 Oct 2021 17:59) (100% - 100%)    HEENT :Pale  Chest : Clear to Auscultation  CVS : S1S2 Normal.No murmurs.  Abdomen: Soft.Non tender .CNS: Alert.Oriented to Time,Place and Person.No focal deficit.  EXT: Normal Range of motion.No pitting edema.    LABS:                        6.6    4.22  )-----------( 82       ( 12 Oct 2021 09:54 )             20.0     10-12    134<L>  |  104  |  106<H>  ----------------------------<  108<H>  4.5   |  15<L>  |  6.51<H>    Ca    7.0<L>      12 Oct 2021 09:54  Phos  7.7     10-12    TPro  7.4  /  Alb  2.5<L>  /  TBili  0.2  /  DBili  x   /  AST  23  /  ALT  13  /  AlkPhos  60  10-11      LIVER FUNCTIONS - ( 11 Oct 2021 22:25 )  Alb: 2.5 g/dL / Pro: 7.4 gm/dL / ALK PHOS: 60 U/L / ALT: 13 U/L / AST: 23 U/L / GGT: x             RADIOLOGY & ADDITIONAL STUDIES:
Chief Complaint:  Patient is a 41y old  Female who presents with a chief complaint of needs outpatient dialysis    HPI:  40 year old female w hx anemia, HTN, RA, SLE on hydroxychloroquine was admitted following ED evaluation of elevated creatinine. Pt now s/p renal biopsy and shiley placement by IR. Nephrology was hopeful for pt to require only short term dialysis. However, pt will soon be discharged and will now require outpatient dialysis. IR consulted for placement of tunneled dialysis catheter.     Allergies  morphine (Rash)  morphine (Unknown)      MEDICATIONS  (STANDING):  ALBUTerol    90 MICROgram(s) HFA Inhaler 2 Puff(s) Inhalation every 6 hours  amLODIPine   Tablet 10 milliGRAM(s) Oral daily  artificial  tears Solution 1 Drop(s) Both EYES two times a day  calcium acetate 667 milliGRAM(s) Oral three times a day with meals  cloNIDine 0.1 milliGRAM(s) Oral daily  collagenase Ointment 1 Application(s) Topical daily  epoetin amee-epbx (RETACRIT) Injectable 6000 Unit(s) IV Push <User Schedule>  gabapentin 100 milliGRAM(s) Oral three times a day  heparin   Injectable 5000 Unit(s) SubCutaneous every 12 hours  hydroxychloroquine 200 milliGRAM(s) Oral daily  influenza   Vaccine 0.5 milliLiter(s) IntraMuscular once  methylPREDNISolone sodium succinate Injectable 30 milliGRAM(s) IV Push two times a day  pantoprazole    Tablet 40 milliGRAM(s) Oral before breakfast    MEDICATIONS  (PRN):  acetaminophen   Tablet .. 650 milliGRAM(s) Oral every 6 hours PRN Temp greater or equal to 38C (100.4F), Mild Pain (1 - 3)  ALBUTerol    90 MICROgram(s) HFA Inhaler 1 Puff(s) Inhalation every 6 hours PRN SOB/Wheezing/Congestion  diphenhydrAMINE 25 milliGRAM(s) Oral every 4 hours PRN Rash and/or Itching  loperamide 2 milliGRAM(s) Oral three times a day PRN Diarrhea  melatonin 3 milliGRAM(s) Oral at bedtime PRN Insomnia  ondansetron Injectable 4 milliGRAM(s) IV Push every 8 hours PRN Nausea and/or Vomiting  oxycodone    5 mG/acetaminophen 325 mG 2 Tablet(s) Oral every 6 hours PRN Severe Pain (7 - 10)      PAST MEDICAL & SURGICAL HISTORY:  Lupus    Rheumatoid arthritis    H/O Raynaud&#x27;s syndrome    Heroin abuse    Smoker    Rheumatoid arthritis    Sepsis    HTN (hypertension)    COPD (chronic obstructive pulmonary disease)    Depression    Epilepsy    GERD (gastroesophageal reflux disease)    Raynaud&#x27;s syndrome without gangrene    Bacteremia    Systemic lupus erythematosus    Aphonia    H/O tubal ligation    H/O appendicitis    Gall bladder stones    H/O tracheostomy    S/P percutaneous endoscopic gastrostomy (PEG) tube placement        FAMILY HISTORY:  No pertinent family history in first degree relatives  cannot recall family history at this time      Vital Signs Last 24 Hrs  T(C): 36.1 (25 Oct 2021 08:27), Max: 36.4 (24 Oct 2021 15:55)  T(F): 97 (25 Oct 2021 08:27), Max: 97.6 (24 Oct 2021 15:55)  HR: 99 (25 Oct 2021 08:27) (89 - 99)  BP: 144/94 (25 Oct 2021 08:27) (123/77 - 144/94)  BP(mean): --  RR: 18 (25 Oct 2021 08:27) (18 - 18)  SpO2: 98% (25 Oct 2021 08:27) (96% - 98%)                
Patient is a 40y old  Female who presents with a chief complaint of UTI  acute renal failure     HPI:  39 y/o female with h/o anemia, HTN, RA, SLE on hydroxychloroquine, OA was admitted on 10/12 for elevated creatinine. Cr 10- was 5.8 where baseline < 1.0. She has b/l knees pain. She has knees and shoulders and wrists; not able to use crutches or a walker so has been using a wheelchair. Noted with coccyx decubitus. Concern for underlying infection was raised. She received ceftriaxone and doxycycline.     PAST MEDICAL HX  Aphonia hx  Bacteremia   COPD (chronic obstructive pulmonary disease)   Depression   Epilepsy   GERD (gastroesophageal reflux disease)    Heroin abuse history  HTN (hypertension)  Hx intubation w acute hypoxic resp failure, asp    Hx MRSA  Raynaud's syndrome without gangrene   RA rheumatoid arthritis   Sepsis   Smoker   SLE systemic lupus erythematosus.     PAST SURGICAL HX  Gall bladder stones   H/O appendicitis   H/O tracheostomy 2020  H/O tubal ligation   S/P percutaneous endoscopic gastrostomy (PEG) tube placement.2020     FAMILY HX  Heart Disease: Mother  Father: unknown  No Family Hx of: diagnosed with Diabetes, Cancer, Hypertension,   Stroke, Mental Illness    SOCIAL HX  former smoker  no alcohol  once a month marijuana (12 Oct 2021 02:15)      PMH: as above  PSH: as above  Meds: per reconciliation sheet, noted below  MEDICATIONS  (STANDING):  cefTRIAXone Injectable. 1000 milliGRAM(s) IV Push every 24 hours  collagenase Ointment 1 Application(s) Topical daily  dextrose 5% 1000 milliLiter(s) (100 mL/Hr) IV Continuous <Continuous>  doxycycline hyclate Capsule 100 milliGRAM(s) Oral every 12 hours  gabapentin 100 milliGRAM(s) Oral three times a day  hydroxychloroquine 200 milliGRAM(s) Oral daily  influenza   Vaccine 0.5 milliLiter(s) IntraMuscular once  pantoprazole Infusion 8 mG/Hr (10 mL/Hr) IV Continuous <Continuous>    MEDICATIONS  (PRN):  acetaminophen   Tablet .. 650 milliGRAM(s) Oral every 6 hours PRN Temp greater or equal to 38C (100.4F), Mild Pain (1 - 3)  cloNIDine 0.1 milliGRAM(s) Oral every 8 hours PRN BP sys > 140 or diastolic > 100  diphenhydrAMINE 25 milliGRAM(s) Oral every 4 hours PRN Rash and/or Itching  melatonin 3 milliGRAM(s) Oral at bedtime PRN Insomnia  ondansetron Injectable 4 milliGRAM(s) IV Push every 8 hours PRN Nausea and/or Vomiting    Allergies    morphine (Rash)  morphine (Unknown)    Intolerances      Social: no smoking, no alcohol, no illegal drugs; no recent travel, no exposure to TB  FAMILY HISTORY:  No pertinent family history in first degree relatives  cannot recall family history at this time      no history of premature cardiovascular disease in first degree relatives    ROS: the patient denies fever, no chills, no HA, no seizures, no dizziness, no sore throat, no nasal congestion, no blurry vision, no CP, no palpitations, no SOB, no cough, no abdominal pain, no diarrhea, no N/V, no dysuria, no leg pain, no claudication, no rash, no joint aches, no rectal pain or bleeding, no night sweats  All other systems reviewed and are negative    Vital Signs Last 24 Hrs  T(C): 36.6 (13 Oct 2021 07:47), Max: 37.7 (12 Oct 2021 13:33)  T(F): 97.9 (13 Oct 2021 07:47), Max: 99.9 (12 Oct 2021 13:33)  HR: 74 (13 Oct 2021 08:57) (73 - 91)  BP: 164/106 (13 Oct 2021 08:57) (141/96 - 175/101)  BP(mean): 1 (12 Oct 2021 21:45) (1 - 1)  RR: 17 (13 Oct 2021 07:47) (15 - 18)  SpO2: 100% (13 Oct 2021 07:47) (99% - 100%)  Daily     Daily     PE:    Constitutional:  No acute distress  HEENT: NC/AT, EOMI, PERRLA, conjunctivae clear; ears and nose atraumatic; pharynx benign  Neck: supple; thyroid not palpable  Back: no tenderness  Respiratory: respiratory effort normal; clear to auscultation  Cardiovascular: S1S2 regular, no murmurs  Abdomen: soft, not tender, not distended, positive BS; no liver or spleen organomegaly  Genitourinary: no suprapubic tenderness  Lymphatic: no LN palpable  Musculoskeletal: no muscle tenderness, no joint swelling or tenderness  Coccyx full-thickness wound measuring [id number].  Wound with 100% black eschar. No odor, periwound intact; no surrounding erythema.   Extremities: no pedal edema  Neurological/ Psychiatric: AxOx3, judgement and insight normal; moving all extremities  Skin: no rashes; no palpable lesions    Labs: all available labs reviewed                        8.2    x     )-----------( x        ( 13 Oct 2021 01:33 )             24.7     10-12    134<L>  |  104  |  106<H>  ----------------------------<  108<H>  4.5   |  15<L>  |  6.51<H>    Ca    7.0<L>      12 Oct 2021 09:54  Phos  7.7     10-    TPro  7.4  /  Alb  2.5<L>  /  TBili  0.2  /  DBili  x   /  AST  23  /  ALT  13  /  AlkPhos  60  10-11     LIVER FUNCTIONS - ( 11 Oct 2021 22:25 )  Alb: 2.5 g/dL / Pro: 7.4 gm/dL / ALK PHOS: 60 U/L / ALT: 13 U/L / AST: 23 U/L / GGT: x           Urinalysis Basic - ( 11 Oct 2021 22:25 )    Color: Yellow / Appearance: Slightly Turbid / S.015 / pH: x  Gluc: x / Ketone: Negative  / Bili: Negative / Urobili: Negative mg/dL   Blood: x / Protein: 500 mg/dL / Nitrite: Negative   Leuk Esterase: Moderate / RBC: >50 /HPF / WBC 26-50   Sq Epi: x / Non Sq Epi: Occasional / Bacteria: TNTC      COVID-19 PCR: NotDetec (10-11-21 @ 22:25)    Radiology: all available radiological tests reviewed    < from: CT Abdomen and Pelvis No Cont (10.12.21 @ 00:01) >  4 mm right lower lobe nodule (2:7). New since previous exam. This is indeterminant.  Subtletree-in-bud type opacities in the right lower lobe almost completely resolved when compared to the previous exam from 2020.  No hydronephrosis. 2 mm nonobstructing left renal mid segment calcification.  Ill-defined left para-aortic soft tissue may represent lymphadenopathy. This is very poorly evaluated on this exam. Mildly enlarged bilateral pelvic and inguinal lymph nodes.. Further evaluation is needed contrast-enhanced examination is recommended.  < end of copied text >      Advanced directives addressed: full resuscitation
CC: high Creatinine    HPI:  40 year old female w hx anemia, HTN, RA, SLE on hydroxychloroquine was told to go to ED for evaluation of elevated creatinine.  Cr 10- was 5.8 where baseline < 1.0  Has followed up w/ rheum q 3 months with Dr. Del Rosario.  Uses meloxicam 15 mg qd  last week she used aleve x 2 tabs but usually supplements w acetaminophen  due to chronic pain she was to start gabapentin  she also just picked up her prescription for a new antihypertenive, lisinopril she has not started yet.    States she sees Rheum, Dr. Del Rosario w/ diagnosis of RA and SLE. Today she denies any fevers, no rashes now; reports hx of malar rash and not now; reports hx of oral ulcers and not today; hx of raynaud’s of hands/feet to keep warm; notes dry eyes; notes dry mouth.    Saw Renal w/ renal insuff thought to be related to NSAID vs r/o lupus nephritis and plan for kidney biopsy w/ > 7 gm of proteinuria also.    She is being treated here for UTI also.  Her urine tox + for cocaine and marijuana.     PAST MEDICAL HX  Aphonia hx  Bacteremia   COPD (chronic obstructive pulmonary disease)   Depression   Epilepsy   GERD (gastroesophageal reflux disease)    Heroin abuse history  HTN (hypertension)  Hx intubation w acute hypoxic resp failure, asp    Hx MRSA  Raynaud's syndrome without gangrene   RA rheumatoid arthritis   Sepsis   Smoker   SLE systemic lupus erythematosus.     PAST SURGICAL HX  Gall bladder stones   H/O appendicitis   H/O tracheostomy 2020  H/O tubal ligation   S/P percutaneous endoscopic gastrostomy (PEG) tube placement.2020     FAMILY HX  Heart Disease: Mother  Father: unknown  No Family Hx of: diagnosed with Diabetes, Cancer, Hypertension,   Stroke, Mental Illness    SOCIAL HX  former smoker  no alcohol  once a month marijuana (12 Oct 2021 02:15)          LABS:                           9.3    4.77  )-----------( 181      ( 14 Oct 2021 08:39 )             28.5     10-14    136  |  103  |  89<H>  ----------------------------<  148<H>  3.6   |  20<L>  |  5.50<H>    Ca    6.8<L>      14 Oct 2021 08:39  Phos  5.3     10-  Mg     1.2     10-14    TPro  5.7<L>  /  Alb  1.7<L>  /  TBili  0.2  /  DBili  x   /  AST  13<L>  /  ALT  <6<L>  /  AlkPhos  37<L>  10    COVID-19 PCR: NotDetec (11 Oct 2021 22:25)    CAPILLARY BLOOD GLUCOSE      Cocaine Metabolite, Urine: Positive (10.13.21 @ 11:30)   Cocaine Metabolite, Urine: Positive (20 @ 05:41) THC, Urine Qualitative: Positive (10.13.21 @ 11:30)   THC, Urine Qualitative: Negative (20 @ 05:41) C4 Complement, Serum: 11 mg/dL (10.13.21 @ 09:44)   C4 Complement, Serum: 10 mg/dL (20 @ 05:54) C3 Complement, Serum: 67 mg/dL (10.13.21 @ 09:44)   C3 Complement, Serum: 54 mg/dL (20 @ 05:54) < from: MR Pelvis Bony Only No Cont (10.13.21 @ 14:58) >  IMPRESSION:  1.  Sacral decubitus ulcer, as described above.  2.  No MR evidence of acute osteomyelitis.  3.  Moderate to large amount of pelvic free fluid.    < end of copied text >  < from: MR Pelvis Bony Only No Cont (10.13.21 @ 14:58) >  FINDINGS:    Soft Tissues: A decubitus ulcer is identified along the rightward aspect of the sacrum overlying the S4-coccyx levels. The soft tissue wound measures approximately 39 x 72 mm. The wound extends to the level of the subcutaneous fat and approaches the posterior cortex of the sacrum/coccyx. There is no associated abscess or drainable fluid collection.    < end of copied text >      Culture - Urine (collected 11 Oct 2021 22:25)  Source: Clean Catch None  Preliminary Report (13 Oct 2021 21:39):    >100,000 CFU/ml Enterobacter cloacae complex                                  8.8    4.72  )-----------( 125      ( 13 Oct 2021 09:44 )             26.5     10-13    139  |  107  |  99<H>  ----------------------------<  101<H>  4.4   |  19<L>  |  6.10<H>    Ca    7.0<L>      13 Oct 2021 09:44  Phos  7.7     10-13    TPro  5.7<L>  /  Alb  1.7<L>  /  TBili  0.2  /  DBili  x   /  AST  13<L>  /  ALT  <6<L>  /  AlkPhos  37<L>  10    COVID-19 PCR: NotDetec (11 Oct 2021 22:25)    CAPILLARY BLOOD GLUCOSE          Culture - Urine (collected 11 Oct 2021 22:25)  Source: Clean Catch None  Preliminary Report (13 Oct 2021 21:39):    >100,000 CFU/ml Enterobacter cloacae complex                   6.6    4.22  )-----------( 82       ( 12 Oct 2021 09:54 )             20.0     10-12    134<L>  |  104  |  106<H>  Cocaine Metabolite, Urine: Positive (10.13.21 @ 11:30) THC, Urine Qualitative: Positive (10.13.21 @ 11:30) Protein/Creatinine Ratio, Urine (10.13.21 @ 11:30)   Protein/Creatinine Ratio Calculation: 7.5 Ratio   Total Protein, Random Urine: 241 mg/dL   Creatinine, Random Urine: 32:C-Reactive Protein, Serum: 55 mg/L (10.13.21 @ 09:45) Sedimentation Rate, Erythrocyte: 25 mm/hrC4 Complement, Serum: 11 mg/dL (10.13.21 @ 09:44) C3 Complement, Serum: 67 mg/dL (10.13.21 @ 09:44) ----------------------------<  108<H>  4.5   |  15<L>  |  6.51<H>    Ca    7.0<L>      12 Oct 2021 09:54  Phos  7.7     10-12    < from: MR Pelvis Bony Only No Cont (10.13.21 @ 14:58) >  IMPRESSION:  1.  Sacral decubitus ulcer, as described above.  2.  No MR evidence of acute osteomyelitis.  3.  Moderate to large amount of pelvic free fluid.    < end of copied text >  < from: CT Abdomen and Pelvis No Cont (10.12.21 @ 00:01) >  IMPRESSION: Limited evaluation without intravenous contrast.    4 mm right lower lobe nodule (2:7). New since previous exam. This is indeterminant.    Subtletree-in-bud type opacities in the right lower lobe almost completely resolved when compared to the previous exam from 2020.    No hydronephrosis. 2 mm nonobstructing left renal mid segment calcification.    Ill-defined left para-aortic soft tissue may represent lymphadenopathy. This is very poorly evaluated on this exam. Mildly enlarged bilateral pelvic and inguinal lymph nodes.. Further evaluation is needed contrast-enhanced examination is recommended.    < end of copied text >  < from: 12 Lead ECG (10.11.21 @ 23:11) >    Diagnosis Line Normal sinus rhythm  Normal ECG    < end of copied text >  TPro  7.4  /  Alb  2.5<L>  /  TBili  0.2  /  DBili  x   /  AST  23  /  ALT  13  /  AlkPhos  60  10-11    Urinalysis Basic - ( 11 Oct 2021 22:25 )  Color: Yellow / Appearance: Slightly Turbid / S.015 / pH: x  Gluc: x / Ketone: Negative  / Bili: Negative / Urobili: Negative mg/dL   Blood: x / Protein: 500 mg/dL / Nitrite: Negative   Leuk Esterase: Moderate / RBC: >50 /HPF / WBC 26-50   Sq Epi: x / Non Sq Epi: Occasional / Bacteria: TNTC    CARDIAC MARKERS ( 11 Oct 2021 22:25 )  x     / x     / 142 U/L / x     / x        RADIOLOGY & ADDITIONAL TESTS:  EXAM:  CT ABDOMEN AND PELVIS                        PROCEDURE DATE:  10/12/2021      INTERPRETATION:  CLINICAL INFORMATION: NATALIA with hematuria    COMPARISON: CT abdomen pelvis 2020. CT chest 2020    CONTRAST/COMPLICATIONS:  IV Contrast: NONE  Oral Contrast: NONE  Complications: None reported at time of study completion    PROCEDURE:  CT of the Abdomen and Pelvis was performed.  Sagittal and coronal reformats were performed.    FINDINGS:  LOWER CHEST: The cardiac septum is dense relative to the chambers consistent with anemia.  4 mm right lower lobe nodule (2:7). New since previous exam  Subtle tree-in-bud type opacities in the right lower lobe almost completely resolved when compared to the previous exam from 2020.    LIVER: Within normal limits.  BILE DUCTS: Normal caliber.  GALLBLADDER: Cholecystectomy.  SPLEEN: Within normal limits.  PANCREAS: Within normal limits.  ADRENALS: Within normal limits.  KIDNEYS/URETERS: No hydronephrosis. 3 mm nonobstructing left renal mid segment calcification.    BLADDER: Within normal limits.  REPRODUCTIVE ORGANS: Very poorly visualized.    BOWEL: No bowel obstruction. Appendix is not visualized. No evidence of inflammation in the pericecal region.  PERITONEUM: Small amount of pelvic ascites.  VESSELS: Within normal limits.  RETROPERITONEUM/LYMPH NODES: Ill-defined left para-aortic soft tissue may represent lymphadenopathy. This is very poorly evaluated on this exam. Mildly enlarged bilateral pelvic and inguinal lymph nodes.  ABDOMINAL WALL: Mild anasarca.  BONES: Degenerative changes of the lumbosacral spine.    IMPRESSION: Limited evaluation without intravenous contrast.    4 mm right lower lobe nodule (2:7). New since previous exam. This is indeterminant.    Subtletree-in-bud type opacities in the right lower lobe almost completely resolved when compared to the previous exam from 2020.    No hydronephrosis. 2 mm nonobstructing left renal mid segment calcification.    Ill-defined left para-aortic soft tissue may represent lymphadenopathy. This is very poorly evaluated on this exam. Mildly enlarged bilateral pelvic and inguinal lymph nodes.. Further evaluation is needed contrast-enhanced examination is recommended.      PHYSICAL EXAM:  T(C): 36.5 (10-13-21 @ 16:17), Max: 36.6 (10-13-21 @ 07:47)  HR: 87 (10-13-21 @ 16:17) (69 - 87)  BP: 150/108 (10-13-21 @ 16:17) (138/94 - 165/99)  RR: 17 (10-13-21 @ 16:17) (17 - 18)  SpO2: 100% (10-13-21 @ 16:17) (99% - 100%)  GENERAL: NAD, cachectic, pale  HEAD:  Atraumatic, Normocephalic  EYES: EOMI, PERRL, conjunctiva and sclera clear  HEENT: Dry mucous membranes,  NECK: Supple, No JVD  NERVOUS SYSTEM:  Alert & Oriented X3, Motor Strength 5/5 B/L upper and lower extremities; DTRs 2+ intact and symmetric  CHEST/LUNG: Clear to auscultation bilaterally; No rales, rhonchi, wheezing, or rubs  HEART: Regular rate and rhythm; No murmurs, rubs, or gallops  ABDOMEN: Soft, Nontender, Nondistended; Bowel sounds present  GENITOURINARY- Voiding, no palpable bladder  EXTREMITIES:  No edema.   MUSCULOSKELTAL: no synovitis or effusion on exam today   SKIN- dressing removed from LT knee and noted lateral boil actively draining pus. dressing over the coccyx for decubitus. Multiple skin scars from prior skin abscesses.   CNS- alert, oriented X3, non focal     Daily Height in cm: 165.1 (11 Oct 2021 21:06)      MEDICATIONS  (STANDING):  cefTRIAXone Injectable. 1000 milliGRAM(s) IV Push every 24 hours  collagenase Ointment 1 Application(s) Topical daily  dextrose 5% 1000 milliLiter(s) (100 mL/Hr) IV Continuous <Continuous>  doxycycline hyclate Capsule 100 milliGRAM(s) Oral every 12 hours  gabapentin 100 milliGRAM(s) Oral three times a day  heparin   Injectable 5000 Unit(s) SubCutaneous every 8 hours  hydroxychloroquine 200 milliGRAM(s) Oral daily  influenza   Vaccine 0.5 milliLiter(s) IntraMuscular once    MEDICATIONS  (PRN):  acetaminophen   Tablet .. 650 milliGRAM(s) Oral every 6 hours PRN Temp greater or equal to 38C (100.4F), Mild Pain (1 - 3)  cloNIDine 0.1 milliGRAM(s) Oral every 8 hours PRN BP sys > 140 or diastolic > 100  melatonin 3 milliGRAM(s) Oral at bedtime PRN Insomnia  ondansetron Injectable 4 milliGRAM(s) IV Push every 8 hours PRN Nausea and/or Vomiting    Assessment/Plan:   41 y/o F reports hx of RA and SLE w/ Rheum, Dr. Del Rosario coming in w/ high Creat w/ Proteinuria > 7gm w/ Hematuria w/ low C3/C4 w/ UTI.  -Renal inusff thought to be 2/2 to NSAIDs vs eval for lupus nephritis and plan for kidney biopsy once infection improves in agreement w/ Renal, Dr. Pena   -put in for MAE w/ IF; Ds-DNA, ANCA, Sjogren abs, RF/CCP  -continue HCQ 200mg daily, closer to wt based   -discussed to do eye exam on HCQ to monitor for any retinal toxicity outpatient   
Chief Complaint:  Patient is a 41y old  Female who presents with a chief complaint of may need dialysis      HPI:  40 year old female w hx anemia, HTN, RA, SLE on hydroxychloroquine was admitted following ED evaluation of elevated creatinine. Pt now s/p renal biopsy. IR consutled for placement of shiley for possible dialysis.     Allergies  morphine (Rash)  morphine (Unknown)    MEDICATIONS  (STANDING):  ALBUTerol    90 MICROgram(s) HFA Inhaler 2 Puff(s) Inhalation every 6 hours  amLODIPine   Tablet 10 milliGRAM(s) Oral daily  artificial  tears Solution 1 Drop(s) Both EYES two times a day  calcium acetate 667 milliGRAM(s) Oral three times a day with meals  cloNIDine 0.1 milliGRAM(s) Oral daily  collagenase Ointment 1 Application(s) Topical daily  gabapentin 100 milliGRAM(s) Oral three times a day  hydroxychloroquine 200 milliGRAM(s) Oral daily  influenza   Vaccine 0.5 milliLiter(s) IntraMuscular once  methylPREDNISolone sodium succinate IVPB 250 milliGRAM(s) IV Intermittent daily  pantoprazole    Tablet 40 milliGRAM(s) Oral before breakfast    MEDICATIONS  (PRN):  acetaminophen   Tablet .. 650 milliGRAM(s) Oral every 6 hours PRN Temp greater or equal to 38C (100.4F), Mild Pain (1 - 3)  ALBUTerol    90 MICROgram(s) HFA Inhaler 1 Puff(s) Inhalation every 6 hours PRN SOB/Wheezing/Congestion  diphenhydrAMINE 25 milliGRAM(s) Oral every 4 hours PRN Rash and/or Itching  loperamide 2 milliGRAM(s) Oral three times a day PRN Diarrhea  melatonin 3 milliGRAM(s) Oral at bedtime PRN Insomnia  ondansetron Injectable 4 milliGRAM(s) IV Push every 8 hours PRN Nausea and/or Vomiting  oxycodone    5 mG/acetaminophen 325 mG 2 Tablet(s) Oral every 6 hours PRN Severe Pain (7 - 10)      PAST MEDICAL & SURGICAL HISTORY:  Lupus    Rheumatoid arthritis    H/O Raynaud&#x27;s syndrome    Heroin abuse    Smoker    Rheumatoid arthritis    Sepsis    HTN (hypertension)    COPD (chronic obstructive pulmonary disease)    Depression    Epilepsy    GERD (gastroesophageal reflux disease)    Raynaud&#x27;s syndrome without gangrene    Bacteremia    Systemic lupus erythematosus    Aphonia    H/O tubal ligation    H/O appendicitis    Gall bladder stones    H/O tracheostomy    S/P percutaneous endoscopic gastrostomy (PEG) tube placement        FAMILY HISTORY:  No pertinent family history in first degree relatives  cannot recall family history at this time        Vital Signs Last 24 Hrs  T(C): 35.8 (21 Oct 2021 08:52), Max: 36.8 (20 Oct 2021 15:47)  T(F): 96.5 (21 Oct 2021 08:52), Max: 98.3 (20 Oct 2021 15:47)  HR: 86 (21 Oct 2021 08:52) (86 - 111)  BP: 126/88 (21 Oct 2021 08:52) (126/88 - 150/111)  BP(mean): --  RR: 18 (21 Oct 2021 08:52) (17 - 18)  SpO2: 100% (21 Oct 2021 08:52) (98% - 100%)      CBC                        8.8    x     )-----------( x        ( 21 Oct 2021 12:41 )             27.4       Chemistry  10-21    134<L>  |  104  |  99<H>  ----------------------------<  172<H>  4.8   |  17<L>  |  7.59<H>    Ca    7.5<L>      21 Oct 2021 09:33  Phos  9.3     10-21    TPro  x   /  Alb  2.0<L>  /  TBili  x   /  DBili  x   /  AST  x   /  ALT  x   /  AlkPhos  x   10-21          
   39 female with anemia, HTN, RA, SLE on hydroxychloroquine was told to go to ED for evaluation of elevated creatinine  Cr 10- was 5.8 where baseline < 1.0. Patient with a recent admit for NATALIA with peak creatinine in the 2's, seen by Dr Pena. Has a history of IVDA as well. Patient reports seeing rheum/labs in january and missed last appt. She reports going to Dr Rogers for most recent lab draw and sent in for the results. She reports going to this appt as she thought it odd that she had pink urine. Uses meloxicam 15 mg qd, has used aleve x 2 tabs but usually supplements w acetaminophen.    PAST MEDICAL & SURGICAL HISTORY:  Lupus    Rheumatoid arthritis    H/O Raynaud&#x27;s syndrome    Heroin abuse    Smoker    Rheumatoid arthritis    Sepsis    HTN (hypertension)    COPD (chronic obstructive pulmonary disease)    Depression    Epilepsy    GERD (gastroesophageal reflux disease)    Raynaud&#x27;s syndrome without gangrene    Bacteremia    Systemic lupus erythematosus    Aphonia    H/O tubal ligation    H/O appendicitis    Gall bladder stones    H/O tracheostomy    S/P percutaneous endoscopic gastrostomy (PEG) tube placement        MEDICATIONS  (STANDING):  cefTRIAXone Injectable. 1000 milliGRAM(s) IV Push every 24 hours  collagenase Ointment 1 Application(s) Topical daily  gabapentin 100 milliGRAM(s) Oral three times a day  heparin   Injectable 5000 Unit(s) SubCutaneous every 8 hours  hydroxychloroquine 200 milliGRAM(s) Oral daily  influenza   Vaccine 0.5 milliLiter(s) IntraMuscular once  sodium chloride 0.9%. 1000 milliLiter(s) (100 mL/Hr) IV Continuous <Continuous>    MEDICATIONS  (PRN):  acetaminophen   Tablet .. 650 milliGRAM(s) Oral every 6 hours PRN Temp greater or equal to 38C (100.4F), Mild Pain (1 - 3)  cloNIDine 0.1 milliGRAM(s) Oral every 8 hours PRN BP sys > 140 or diastolic > 100  melatonin 3 milliGRAM(s) Oral at bedtime PRN Insomnia  ondansetron Injectable 4 milliGRAM(s) IV Push every 8 hours PRN Nausea and/or Vomiting      Allergies    morphine (Rash)  morphine (Unknown)    Intolerances        SOCIAL HISTORY:  no etoh now    FAMILY HISTORY:  No pertinent family history in first degree relatives  cannot recall family history at this time      REVIEW OF SYSTEMS:    CONSTITUTIONAL: stable weakness, fevers or chills  EYES/ENT: No visual changes;  No vertigo or throat pain   NECK: No pain or stiffness  RESPIRATORY: No cough, wheezing, hemoptysis; No shortness of breath  CARDIOVASCULAR: No chest pain or palpitations  GASTROINTESTINAL: No abdominal or epigastric pain. No nausea, vomiting, or hematemesis; No diarrhea or constipation. No melena or hematochezia.  GENITOURINARY: No dysuria, frequency or hematuria  NEUROLOGICAL: No numbness or weakness  SKIN: No itching, burning, rashes, or lesions   All other review of systems is negative unless indicated above.      T(C): , Max: 36.8 (10-11-21 @ 21:11)  T(F): , Max: 98.2 (10-11-21 @ 21:11)  HR: 85 (10-12-21 @ 08:14)  BP: 152/100 (10-12-21 @ 08:14)  BP(mean): 127 (10-11-21 @ 21:11)  RR: 16 (10-12-21 @ 08:14)  SpO2: 100% (10-12-21 @ 08:14)  Wt(kg): --    Height (cm): 165.1 (10-11 @ 21:06)  Weight (kg): 49.9 (10-11 @ 21:06)  BMI (kg/m2): 18.3 (10-11 @ 21:06)  BSA (m2): 1.53 (10-11 @ 21:06)    PHYSICAL EXAM:    Constitutional: NAD, frail. >then stated age  HEENT: dry MM  Neck: No LAD, No JVD  Respiratory: dist  Cardiovascular: S1 and S2  Gastrointestinal: BS+, soft  Extremities: No peripheral edema, le contorted  Neurological: A/O x 3        LABS:                        6.6    4.22  )-----------( 82       ( 12 Oct 2021 09:54 )             20.0     12 Oct 2021 09:54    134    |  104    |  106    ----------------------------<  108    4.5     |  15     |  6.51   11 Oct 2021 22:25    133    |  101    |  97     ----------------------------<  89     3.9     |  17     |  6.72     Ca    7.0        12 Oct 2021 09:54  Ca    7.3        11 Oct 2021 22:25  Phos  7.7       12 Oct 2021 09:54    TPro  7.4    /  Alb  2.5    /  TBili  0.2    /  DBili  x      /  AST  23     /  ALT  13     /  AlkPhos  60     11 Oct 2021 22:25      Creatine Kinase, Serum: 142 U/L [26 - 192] (10- @ 22:25)      Urine Studies:  Urinalysis Basic - ( 11 Oct 2021 22:25 )    Color: Yellow / Appearance: Slightly Turbid / S.015 / pH: x  Gluc: x / Ketone: Negative  / Bili: Negative / Urobili: Negative mg/dL   Blood: x / Protein: 500 mg/dL / Nitrite: Negative   Leuk Esterase: Moderate / RBC: >50 /HPF / WBC 26-50   Sq Epi: x / Non Sq Epi: Occasional / Bacteria: TNTC            RADIOLOGY & ADDITIONAL STUDIES:

## 2021-10-25 NOTE — CONSULT NOTE ADULT - CONSULT REASON
abx management
RA/SLE
41yo F with NATALIA of unknown etiology referred to IR for possible shiley placement
Anemia. BRBPR
NATALIA
40yo F with NATALIA requiring dialysis, needs conversion of nontunneled to tunneled catheter

## 2021-10-25 NOTE — PROGRESS NOTE ADULT - SUBJECTIVE AND OBJECTIVE BOX
Rheum:  Dr. Del Rosario [facility]    10/25/21  - feeling well, no new issues.  Labs pending ... renal function remains poor.. on dialysis   Ulceration on side tongue- deep / painful similar on L to lesser extent  NO fevers, BP remains fair with DBP 77-88     REVIEW OF SYSTEMS:    Negative except as above    Vital Signs Last 24 Hrs  T(C): 36.2 (25 Oct 2021 14:45), Max: 36.4 (24 Oct 2021 15:55)  T(F): 97.2 (25 Oct 2021 14:45), Max: 97.6 (24 Oct 2021 15:55)  HR: 86 (25 Oct 2021 15:05) (83 - 99)  BP: 141/91 (25 Oct 2021 15:05) (123/77 - 144/94)  BP(mean): --  RR: 16 (25 Oct 2021 15:05) (16 - 18)  SpO2: 98% (25 Oct 2021 08:27) (96% - 98%)      PE:     Constitutional: NAD, awake and alert   HEENT- + oral lesions noted, no lymphadenoapthy noted  Heart- S1 S2 reg  Lungs- CTA b/l  Abd- Soft NT  Ext- +edema LE, nearly fully resolved  Skin- multiple emilia of venous scleroisis, vdiffuse scaring throughout forearms most severely TNTC- all fully healed, few scattered lesions lower legs healed, decubiti- nearly fully healed.. no local redness/ warmth  Musculoskelatal- FROM all joints, L knee warm, fROM- no overt redness or pain; deformities of hands contractures of several fingers on R and 2-3 on L- flexion contracture w/ limited extension; muscle wasting throughout L > R hand  Neurological- intact strength upper and lower extremities- except inability to extend lower leg on L (not new).. hip flexor intact and strong/ dorsiflexion/ extension foot strong      MEDICATIONS  (STANDING):  ALBUTerol    90 MICROgram(s) HFA Inhaler 2 Puff(s) Inhalation every 6 hours  amLODIPine   Tablet 10 milliGRAM(s) Oral daily  artificial  tears Solution 1 Drop(s) Both EYES two times a day  atovaquone  Suspension 1500 milliGRAM(s) Oral daily  calcium acetate 1334 milliGRAM(s) Oral three times a day with meals  cloNIDine 0.1 milliGRAM(s) Oral daily  collagenase Ointment 1 Application(s) Topical daily  epoetin amee-epbx (RETACRIT) Injectable 6000 Unit(s) IV Push <User Schedule>  gabapentin 100 milliGRAM(s) Oral three times a day  heparin   Injectable 5000 Unit(s) SubCutaneous every 12 hours  hydroxychloroquine 200 milliGRAM(s) Oral daily  influenza   Vaccine 0.5 milliLiter(s) IntraMuscular once  methylPREDNISolone sodium succinate Injectable 30 milliGRAM(s) IV Push two times a day  pantoprazole    Tablet 40 milliGRAM(s) Oral before breakfast    MEDICATIONS  (PRN):  acetaminophen   Tablet .. 650 milliGRAM(s) Oral every 6 hours PRN Temp greater or equal to 38C (100.4F), Mild Pain (1 - 3)  ALBUTerol    90 MICROgram(s) HFA Inhaler 1 Puff(s) Inhalation every 6 hours PRN SOB/Wheezing/Congestion  diphenhydrAMINE 25 milliGRAM(s) Oral every 4 hours PRN Rash and/or Itching  loperamide 2 milliGRAM(s) Oral three times a day PRN Diarrhea  melatonin 3 milliGRAM(s) Oral at bedtime PRN Insomnia  ondansetron Injectable 4 milliGRAM(s) IV Push every 8 hours PRN Nausea and/or Vomiting  oxycodone    5 mG/acetaminophen 325 mG 2 Tablet(s) Oral every 6 hours PRN Severe Pain (7 - 10)        LABS: All Labs Reviewed:                        8.6    10.85 )-----------( 428      ( 25 Oct 2021 12:19 )             25.6                           8.2    7.85  )-----------( 284      ( 23 Oct 2021 08:45 )             24.8     10-25    136  |  103  |  94<H>  ----------------------------<  105<H>  4.0   |  20<L>  |  5.56<H>    Ca    7.8<L>      25 Oct 2021 12:19    TPro  6.8  /  Alb  2.6<L>  /  TBili  0.2  /  DBili  <0.1  /  AST  22  /  ALT  25  /  AlkPhos  58  10-25    10-23    137  |  104  |  92<H>  ----------------------------<  201<H>  3.9   |  18<L>  |  6.44<H>    Ca    7.6<L>      23 Oct 2021 08:45  Phos  8.7     10-23    TPro  x   /  Alb  2.2<L>  /  TBili  x   /  DBili  x   /  AST  x   /  ALT  x   /  AlkPhos  x   10-23      Blood Culture: neg   Urine +Enterobacter cloace    RADIOLOGY:     TTE  Summary     The left ventricle is normal in size, wall thickness, wall motion and   contractility.   Estimated left ventricular ejection fraction is 50-55 %.   Mild (1+) mitral regurgitation.   Mild (1+) tricuspid valve regurgitation.    10/25/21  Venous mapping:     FINDINGS:    RIGHT side:    The right basilic vein are grossly patent.    Right cephalic vein not well seen.    Right basilic vein measures  3  mm at the axillary junction,  2.5  mm in the proximal upper arm,  2.5  mm mid upper arm,  2.0 mm distal forearm,  2.5  mm antecubital fossa, the remainder is limited.    LEFT side: The left cephalic and basilic vein are grossly patent.    The left cephalic vein at the subclavian junction measures  4.1  mm, proximal upper arm 2.7 mm, mid upper arm 2.4  mm, the remainder is limited.    The basilic vein at the axillary junction measures  2.5  mm, proximal upper arm 2.1  mm, mid upper arm  2.4  mm, distal upper arm 1.8  mm, the remainder is limited.    IMPRESSION:    Bilateral venous mapping of the cephalic and basilic veins as described above    --- End of Report ---      RADIOLOGY/EKG:    A/P:

## 2021-10-25 NOTE — PROGRESS NOTE ADULT - SUBJECTIVE AND OBJECTIVE BOX
39 female with anemia, HTN, RA, SLE on hydroxychloroquine was told to go to ED for evaluation of elevated creatinine  Cr 10- was 5.8 where baseline < 1.0. Patient with a recent admit for NATALIA with peak creatinine in the 2's, seen by Dr Pena. Has a history of IVDA as well. Patient reports seeing rheum/labs in january and missed last appt. She reports going to Dr Rogers for most recent lab draw and sent in for the results. She reports going to this appt as she thought it odd that she had pink urine. Uses meloxicam 15 mg qd, has used aleve x 2 tabs but usually supplements w acetaminophen.    10/25  Elevated MPO   Antihistone Ab +   d/w Rheum re Rituxan - in light of aggressive path findings and severe renal failure requiring acute HD    need to consider pros and cons given pts hx/ and renal bx findings and potential need for lifelong HD if no recovery      await labs today   vein mapping/ vasc consult  although pt is free of drug use for over a year,  as per her account informed her of responsibilities of  having a AVF on her arm.       PAST MEDICAL & SURGICAL HISTORY:  Lupus    Rheumatoid arthritis    H/O Raynaud&#x27;s syndrome    Heroin abuse    Smoker    Rheumatoid arthritis    Sepsis    HTN (hypertension)    COPD (chronic obstructive pulmonary disease)    Depression    Epilepsy    GERD (gastroesophageal reflux disease)    Raynaud&#x27;s syndrome without gangrene    Bacteremia    Systemic lupus erythematosus    Aphonia    H/O tubal ligation    H/O appendicitis    Gall bladder stones    H/O tracheostomy    S/P percutaneous endoscopic gastrostomy (PEG) tube placement      MEDICATIONS  (STANDING):  ALBUTerol    90 MICROgram(s) HFA Inhaler 2 Puff(s) Inhalation every 6 hours  amLODIPine   Tablet 10 milliGRAM(s) Oral daily  artificial  tears Solution 1 Drop(s) Both EYES two times a day  atovaquone  Suspension 1500 milliGRAM(s) Oral daily  calcium acetate 667 milliGRAM(s) Oral three times a day with meals  cloNIDine 0.1 milliGRAM(s) Oral daily  collagenase Ointment 1 Application(s) Topical daily  epoetin amee-epbx (RETACRIT) Injectable 6000 Unit(s) IV Push <User Schedule>  gabapentin 100 milliGRAM(s) Oral three times a day  heparin   Injectable 5000 Unit(s) SubCutaneous every 12 hours  hydroxychloroquine 200 milliGRAM(s) Oral daily  influenza   Vaccine 0.5 milliLiter(s) IntraMuscular once  methylPREDNISolone sodium succinate Injectable 30 milliGRAM(s) IV Push two times a day  pantoprazole    Tablet 40 milliGRAM(s) Oral before breakfast        Allergies    morphine (Rash)  morphine (Unknown)    Intolerances        SOCIAL HISTORY:  no etoh now    FAMILY HISTORY:  No pertinent family history in first degree relatives  cannot recall family history at this time      REVIEW OF SYSTEMS:    CONSTITUTIONAL: stable weakness, fevers or chills  EYES/ENT: No visual changes;  No vertigo or throat pain   NECK: No pain or stiffness  RESPIRATORY: No cough, wheezing, hemoptysis; No shortness of breath  CARDIOVASCULAR: No chest pain or palpitations  GASTROINTESTINAL: No abdominal or epigastric pain. No nausea, vomiting, or hematemesis; No diarrhea or constipation. No melena or hematochezia.  GENITOURINARY: No dysuria, frequency or hematuria  NEUROLOGICAL: No numbness or weakness  SKIN: No itching, burning, rashes, or lesions   All other review of systems is negative unless indicated above.    Vital Signs Last 24 Hrs    Vital Signs Last 24 Hrs  T(C): 36.1 (25 Oct 2021 08:27), Max: 36.4 (24 Oct 2021 15:55)  T(F): 97 (25 Oct 2021 08:27), Max: 97.6 (24 Oct 2021 15:55)  HR: 99 (25 Oct 2021 08:27) (89 - 99)  BP: 144/94 (25 Oct 2021 08:27) (123/77 - 144/94)  BP(mean): --  RR: 18 (25 Oct 2021 08:27) (18 - 18)  SpO2: 98% (25 Oct 2021 08:27) (96% - 98%)      PHYSICAL EXAM:    Constitutional: NAD, frail  HEENT: dry MM  Neck: No LAD, No JVD  Respiratory: dist  Cardiovascular: S1 and S2  Gastrointestinal: BS+, soft  Extremities: No peripheral edema, le contorted  Neurological: A/O x 3        LABS:                            8.6    10.85 )-----------( 428      ( 25 Oct 2021 12:19 )             25.6         137    |  104    |  92     ----------------------------<  201       23 Oct 2021 08:45  3.9     |  18     |  6.44     135    |  104    |  108    ----------------------------<  132       22 Oct 2021 04:08  5.1     |  19     |  7.71     Ca    7.8        25 Oct 2021 12:19  Ca    7.6        23 Oct 2021 08:45    Phos  8.7       23 Oct 2021 08:45      TPro  6.8    /  Alb  2.6    /  TBili  0.2    /        25 Oct 2021 12:19  DBili  <0.1   /  AST  22     /  ALT  25     /  AlkPhos  58       TPro  x      /  Alb  2.2    /  TBili  x      /        23 Oct 2021 08:45  DBili  x      /  AST  x      /  ALT  x      /  AlkPhos  x              Creatine Kinase, Serum: 142 U/L [26 - 192] (10-11 @ 22:25)      Urine Studies:  Urinalysis Basic - ( 11 Oct 2021 22:25 )    Color: Yellow / Appearance: Slightly Turbid / S.015 / pH: x  Gluc: x / Ketone: Negative  / Bili: Negative / Urobili: Negative mg/dL   Blood: x / Protein: 500 mg/dL / Nitrite: Negative   Leuk Esterase: Moderate / RBC: >50 /HPF / WBC 26-50   Sq Epi: x / Non Sq Epi: Occasional / Bacteria: TNTC        RADIOLOGY & ADDITIONAL STUDIES:                    39 female with anemia, HTN, RA, SLE on hydroxychloroquine was told to go to ED for evaluation of elevated creatinine  Cr 10- was 5.8 where baseline < 1.0. Patient with a recent admit for NATALIA with peak creatinine in the 2's, seen by Dr Pena. Has a history of IVDA as well. Patient reports seeing rheum/labs in january and missed last appt. She reports going to Dr Rogers for most recent lab draw and sent in for the results. She reports going to this appt as she thought it odd that she had pink urine. Uses meloxicam 15 mg qd, has used aleve x 2 tabs but usually supplements w acetaminophen.    10/25  Elevated MPO   Antihistone Ab +   d/w Rheum re Rituxan - in light of aggressive path findings and severe renal failure requiring acute HD    need to consider pros and cons given pts hx/ and renal bx findings and potential need for lifelong HD if no recovery      await labs today   vein mapping/ vasc consult  although pt supposedly free of drug use for over a year,  Urine tox was + for cocaine on this admission   advised strongly on avoiding all drug use if pt wants any chance of renal recovery       PAST MEDICAL & SURGICAL HISTORY:  Lupus    Rheumatoid arthritis    H/O Raynaud&#x27;s syndrome    Heroin abuse    Smoker    Rheumatoid arthritis    Sepsis    HTN (hypertension)    COPD (chronic obstructive pulmonary disease)    Depression    Epilepsy    GERD (gastroesophageal reflux disease)    Raynaud&#x27;s syndrome without gangrene    Bacteremia    Systemic lupus erythematosus    Aphonia    H/O tubal ligation    H/O appendicitis    Gall bladder stones    H/O tracheostomy    S/P percutaneous endoscopic gastrostomy (PEG) tube placement      MEDICATIONS  (STANDING):  ALBUTerol    90 MICROgram(s) HFA Inhaler 2 Puff(s) Inhalation every 6 hours  amLODIPine   Tablet 10 milliGRAM(s) Oral daily  artificial  tears Solution 1 Drop(s) Both EYES two times a day  atovaquone  Suspension 1500 milliGRAM(s) Oral daily  calcium acetate 667 milliGRAM(s) Oral three times a day with meals  cloNIDine 0.1 milliGRAM(s) Oral daily  collagenase Ointment 1 Application(s) Topical daily  epoetin amee-epbx (RETACRIT) Injectable 6000 Unit(s) IV Push <User Schedule>  gabapentin 100 milliGRAM(s) Oral three times a day  heparin   Injectable 5000 Unit(s) SubCutaneous every 12 hours  hydroxychloroquine 200 milliGRAM(s) Oral daily  influenza   Vaccine 0.5 milliLiter(s) IntraMuscular once  methylPREDNISolone sodium succinate Injectable 30 milliGRAM(s) IV Push two times a day  pantoprazole    Tablet 40 milliGRAM(s) Oral before breakfast        Allergies    morphine (Rash)  morphine (Unknown)    Intolerances        SOCIAL HISTORY:  no etoh now    FAMILY HISTORY:  No pertinent family history in first degree relatives  cannot recall family history at this time      REVIEW OF SYSTEMS:    CONSTITUTIONAL: stable weakness, fevers or chills  EYES/ENT: No visual changes;  No vertigo or throat pain   NECK: No pain or stiffness  RESPIRATORY: No cough, wheezing, hemoptysis; No shortness of breath  CARDIOVASCULAR: No chest pain or palpitations  GASTROINTESTINAL: No abdominal or epigastric pain. No nausea, vomiting, or hematemesis; No diarrhea or constipation. No melena or hematochezia.  GENITOURINARY: No dysuria, frequency or hematuria  NEUROLOGICAL: No numbness or weakness  SKIN: No itching, burning, rashes, or lesions   All other review of systems is negative unless indicated above.    Vital Signs Last 24 Hrs    Vital Signs Last 24 Hrs  T(C): 36.1 (25 Oct 2021 08:27), Max: 36.4 (24 Oct 2021 15:55)  T(F): 97 (25 Oct 2021 08:27), Max: 97.6 (24 Oct 2021 15:55)  HR: 99 (25 Oct 2021 08:27) (89 - 99)  BP: 144/94 (25 Oct 2021 08:27) (123/77 - 144/94)  BP(mean): --  RR: 18 (25 Oct 2021 08:27) (18 - 18)  SpO2: 98% (25 Oct 2021 08:27) (96% - 98%)      PHYSICAL EXAM:    Constitutional: NAD, frail  HEENT: dry MM  Neck: No LAD, No JVD  Respiratory: dist  Cardiovascular: S1 and S2  Gastrointestinal: BS+, soft  Extremities: No peripheral edema, le contorted  Neurological: A/O x 3        LABS:                            8.6    10.85 )-----------( 428      ( 25 Oct 2021 12:19 )             25.6         137    |  104    |  92     ----------------------------<  201       23 Oct 2021 08:45  3.9     |  18     |  6.44     135    |  104    |  108    ----------------------------<  132       22 Oct 2021 04:08  5.1     |  19     |  7.71     Ca    7.8        25 Oct 2021 12:19  Ca    7.6        23 Oct 2021 08:45    Phos  8.7       23 Oct 2021 08:45      TPro  6.8    /  Alb  2.6    /  TBili  0.2    /        25 Oct 2021 12:19  DBili  <0.1   /  AST  22     /  ALT  25     /  AlkPhos  58       TPro  x      /  Alb  2.2    /  TBili  x      /        23 Oct 2021 08:45  DBili  x      /  AST  x      /  ALT  x      /  AlkPhos  x              Creatine Kinase, Serum: 142 U/L [26 - 192] (10-11 @ 22:25)      Urine Studies:  Urinalysis Basic - ( 11 Oct 2021 22:25 )    Color: Yellow / Appearance: Slightly Turbid / S.015 / pH: x  Gluc: x / Ketone: Negative  / Bili: Negative / Urobili: Negative mg/dL   Blood: x / Protein: 500 mg/dL / Nitrite: Negative   Leuk Esterase: Moderate / RBC: >50 /HPF / WBC 26-50   Sq Epi: x / Non Sq Epi: Occasional / Bacteria: TNTC        RADIOLOGY & ADDITIONAL STUDIES:

## 2021-10-25 NOTE — PROGRESS NOTE ADULT - SUBJECTIVE AND OBJECTIVE BOX
CHIEF COMPLAINT: Sent for elevated creatinine    INTERVAL HX: No acute events overnight. Patient seen and evaluated at bedside today. She reports feeling generally well, overall improved compared to admission, still with some mild generalized weakness and she was a bit saddened to hear that she will continue to need dialysis for the time being. Arrangements are being made to switch her catheter to a tunneled dialysis catheter. Social Work / Case Management is aware of her need for an outpatient dialysis center and chair time. Patient was seen by Rheumatology today and we are considering giving her Rituxan prior to discharge.     ROS: Multisystem review is comprehensively negative x 10 systems except as above    EXAM:  Vital Signs Last 24 Hrs  T(F): 97 (25 Oct 2021 08:27), Max: 97.6 (24 Oct 2021 15:55)  HR: 99 (25 Oct 2021 08:27) (89 - 99)  BP: 144/94 (25 Oct 2021 08:27) (123/77 - 144/94)  RR: 18 (25 Oct 2021 08:27) (18 - 18)  SpO2: 98% (25 Oct 2021 08:27) (96% - 98%)    Constitutional: No acute distress  HEENT: NCAT PERRL EOMI, no oral lesions noted  Neck: No lymphadenopathy  Heart: S1 S2 normal, RRR  Lungs: CTA b/l  Abd: +BS Soft NT  Ext: No edema  Skin: No rashes  Musculoskeletal FROM all joints, no active synovitis noted  Neuro: Intact strength upper and lower extremities    LABS:            CBC 10/23                 8.2    7.85  )-----------( 284                    24.8     BMP 10/23    137  |  104  |  92  ----------------------------<  201  3.9   |   18   |  6.44    Ca 7.6 (Alb 2.2)  Phos 8.7        C-ANCA Ab (-) (10/15)  P-ANCA Ab (+) >1:1280 (10/15)  Atypical ANCA (-) (10/15)  MPO Ab (+), 47.7 (10/15)  Prot3 Ab (-), 17.0 (10/15)  MAE 1:1280  speckled (10/15)  RF < 10 (10/15)  CCP (-) (10/15)  SSA Ab >  8.0 (10/15)  SSB Ab 2.6 (10/15)  Histone Ab 6.0 (10/20)  DS DNA Ab 196 (10/15), 181 (10/23)    Rpt ANCA reflex MPO and PROT3 in process  Quantiferon gold in process      MICRO:  COVID 19 PCR 10/21: Neg  COVID 19 PCR 10/18: Neg  Bld Cx 10/15: NGTD  GI pathogens PCR 10/14: Neg  C.diff PCR 10/14: Neg  Bld Cx 10/13: Neg  Bld Cx 10/13: S.epi    IMAGING:  CXR 10/16: Frontal expiratory view of the chest shows the heart to be normal in size. The lungs show mild left base atelectasis and there is no evidence of pneumothorax nor pleural effusion.    MRI pelvis 10/13:  A decubitus ulcer is identified along the rightward aspect of the sacrum overlying the S4-coccyx levels. The soft tissue wound measures approximately 39 x 72 mm. The wound extends to the level of the subcutaneous fat and approaches the posterior cortex of the sacrum/coccyx. There is no associated abscess or drainable fluid collection. No increased STIR signal or loss of T1 hyperintense signal to suggest acute osteomyelitis. No acute fracture. No osteonecrosis. No focal muscle edema. Sacroiliac Joints Normal. Lower Lumbar Spine Normal. Moderate to large amount of pelvic free fluid.    CT A/P WO 10/12: Limited evaluation without intravenous contrast.4 mm right lower lobe nodule (2:7). New since previous exam. This is indeterminant. Subtle tree-in-bud type opacities in the right lower lobe almost completely resolved when compared to the previous exam from 7/23/2020. No hydronephrosis. 2 mm non-obstructing left renal mid segment calcification. Ill-defined left para-aortic soft tissue may represent lymphadenopathy. This is very poorly evaluated on this exam. Mildly enlarged bilateral pelvic and inguinal lymph nodes.. Further evaluation is needed contrast-enhanced examination is recommended.    CARDIAC TESTING:  TTE 10/15: Left ventricle is normal in size, wall thickness, wall motion and contractility. Estimated left ventricular ejection fraction is 50-55 %. Mild (1+) mitral regurgitation. Mild (1+) tricuspid valve regurgitation.    MEDS:  MEDICATIONS  (STANDING):  ALBUTerol    90 MICROgram(s) HFA Inhaler 2 Puff(s) Inhalation every 6 hours  amLODIPine   Tablet 10 milliGRAM(s) Oral daily  artificial  tears Solution 1 Drop(s) Both EYES two times a day  atovaquone  Suspension 1500 milliGRAM(s) Oral daily  calcium acetate 1334 milliGRAM(s) Oral three times a day with meals  cloNIDine 0.1 milliGRAM(s) Oral daily  collagenase Ointment 1 Application(s) Topical daily  epoetin amee-epbx (RETACRIT) Injectable 6000 Unit(s) IV Push <User Schedule>  gabapentin 100 milliGRAM(s) Oral three times a day  heparin   Injectable 5000 Unit(s) SubCutaneous every 12 hours  hydroxychloroquine 200 milliGRAM(s) Oral daily  influenza   Vaccine 0.5 milliLiter(s) IntraMuscular once  methylPREDNISolone sodium succinate Injectable 30 milliGRAM(s) IV Push two times a day  pantoprazole    Tablet 40 milliGRAM(s) Oral before breakfast    MEDICATIONS  (PRN):  acetaminophen   Tablet .. 650 milliGRAM(s) Oral every 6 hours PRN Temp greater or equal to 38C (100.4F), Mild Pain (1 - 3)  ALBUTerol    90 MICROgram(s) HFA Inhaler 1 Puff(s) Inhalation every 6 hours PRN SOB/Wheezing/Congestion  diphenhydrAMINE 25 milliGRAM(s) Oral every 4 hours PRN Rash and/or Itching  loperamide 2 milliGRAM(s) Oral three times a day PRN Diarrhea  melatonin 3 milliGRAM(s) Oral at bedtime PRN Insomnia  ondansetron Injectable 4 milliGRAM(s) IV Push every 8 hours PRN Nausea and/or Vomiting  oxycodone    5 mG/acetaminophen 325 mG 2 Tablet(s) Oral every 6 hours PRN Severe Pain (7 - 10)

## 2021-10-25 NOTE — PROGRESS NOTE ADULT - ASSESSMENT
40 y/o woman with hx of RA and SLE w/ Rheum, Dr. Del Rosario coming in w/ high Creat w/ Proteinuria > 7gm w/ Hematuria w/ low C3/C4 + dsDNA 197, + P-ANCA 1:1280 w/ + MPO 47 w/ UTI.    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  - Completed 3 d of low dose pulse steroids 250 mg methylpred x 3 days now on 1 mg/ kg.. ESR from 99-> 25, and Cr today at 5.5  -Patient has had several  HD, tolerated well with minimal improvement   - Discussed case with Dr. Pena and Dr Maher as well as Dr Del Rosario. Concern at this point is for ongoing infection risk.  Still has some open skin lesions (buttocks and to lesser extent few on legs)... no overt purpura..   If this is all drug induced vasculitis- simply holding the cocaine may be sufficient  If this is AAV, may require more aggressive immunosuppression. Concern for follow up/ compliance and infection is significant.  Most specific and effective tx for MPA vasculitis would be a course of RTX, but Dr. Del Rosario would rather monitor response to steroids before adding a significant immunosuppressive agent...   If there is good chance of renal improvement should be aggressive.  Will repeat complements, dsDNA, Pr/Cr ratio and urinalysis in am.. if improved this may support need to be more aggressive despite risks.  Will make determination after reviewing these studies.    Will also need input from ID regarding same....   -ANCAs repeated for trend  -Monitor glucose, maintain PPI while on steroid  -continue HCQ 200mg daily, closer to wt based   will follow    Jesi Brower to take over rheum service starting 10/25/21

## 2021-10-25 NOTE — PROGRESS NOTE ADULT - ASSESSMENT
41 year-old woman with HTN, COPD, depression, RA and SLE, sent for further evaluation after noted to have elevated Cr, hematuria, proteinuria > 7 grams, low C3/C4.    Acute renal failure in setting of RA, SLE  DS DNA elevated, SSA, SSB+, complements low, +P-ANCA. 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 Completed 3 days of high dose steroid therapy. Underwent dialysis catheter placement 10/22, had HD session thereafter and again 10/23. Tolerated that well. Feels overall well aside from some generalized weakness and physical deconditioning.   - Lowered steroids back down to 1mg/kg daily starting 10/24.   - Planning for possible Rituxan prior to discharge, will defer to Rheumatology and Nephrology  - F/u quant gold (in process)  - F/u repeat ANCAs for trend (in process)  - Monitor glucose, maintain PPI while on steroid  - Continue HCQ 200mg daily, closer to wt based   - Consulted Vascular to assess re more long term dialysis access planning, vein mapping  - Consulted  / SW to assist with outpatient dialysis placement    HTN  BP controlled.   - On Norvasc    COPD  Breathing comfortably.   - On albuterol prn    Severe protein calorie malnutrition  Appreciate input from dietician  - Continue renal diet, trend weights          Diet: Renal diet  DVT px: Heparin subcut  Code status: Full  Emergency contact: Blake Moses, brother [phone]  Dispo: Anticipate DC to home when clinically improved and inpatient workup complete, will need arrangements for outpatient HD center ([place], ,  [place])  41 year-old woman with HTN, COPD, depression, RA and SLE, sent for further evaluation after noted to have elevated Cr, hematuria, proteinuria > 7 grams, low C3/C4.    Acute renal failure in setting of RA, SLE  DS DNA elevated, SSA, SSB+, complements low, +P-ANCA. 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 Completed 3 days of high dose steroid therapy. Underwent dialysis catheter placement 10/22, had HD session thereafter and again 10/23. Tolerated that well. Feels overall well aside from some generalized weakness and physical deconditioning.   - Lowered steroids back down to 1mg/kg daily starting 10/24.   - Planning for possible Rituxan prior to discharge, will defer to Rheumatology and Nephrology  - F/u quant gold (in process)  - F/u repeat ANCAs for trend (in process)  - Monitor glucose, maintain PPI while on steroid  - Continue HCQ 200mg daily, closer to wt based   - Mepron daily  - Planned for conversion to tunneled dialysis catheter by IR tomorrow, NPO except meds after midnight  - Consulted Vascular to assess re more long term dialysis access planning, vein mapping  - Consulted  / SW to assist with outpatient dialysis placement    CKD anemia  Hgb 8.2.   - Ordered iron studies to asses iron stores  - Started on Epo 6000 units MWF with HD    CKD bone mineral disease  Phos 8.7.  - PTH and Vit D levels requested  - Patient started on ca acetate    HTN  BP controlled.   - On Norvasc    COPD  Breathing comfortably.   - On albuterol prn    Severe protein calorie malnutrition  Appreciate input from dietician  - Continue renal diet, trend weights          Diet: Renal diet  DVT px: Heparin subcut  Code status: Full  Dispo: Anticipate DC to home when clinically improved and inpatient workup complete, will need arrangements for outpatient HD center ([place], [place])

## 2021-10-26 LAB
24R-OH-CALCIDIOL SERPL-MCNC: 8.2 NG/ML — LOW (ref 30–80)
ANION GAP SERPL CALC-SCNC: 10 MMOL/L — SIGNIFICANT CHANGE UP (ref 5–17)
BUN SERPL-MCNC: 67 MG/DL — HIGH (ref 7–23)
C3 SERPL-MCNC: 75 MG/DL — LOW (ref 81–157)
C4 SERPL-MCNC: 8 MG/DL — LOW (ref 13–39)
CALCIUM SERPL-MCNC: 7.5 MG/DL — LOW (ref 8.5–10.1)
CALCIUM SERPL-MCNC: 7.8 MG/DL — LOW (ref 8.4–10.5)
CHLORIDE SERPL-SCNC: 105 MMOL/L — SIGNIFICANT CHANGE UP (ref 96–108)
CO2 SERPL-SCNC: 23 MMOL/L — SIGNIFICANT CHANGE UP (ref 22–31)
CREAT SERPL-MCNC: 4.22 MG/DL — HIGH (ref 0.5–1.3)
DSDNA AB SER-ACNC: 103 IU/ML — HIGH
GLUCOSE SERPL-MCNC: 149 MG/DL — HIGH (ref 70–99)
HBV SURFACE AB SER-ACNC: SIGNIFICANT CHANGE UP
PHOSPHATE SERPL-MCNC: 7.1 MG/DL — HIGH (ref 2.5–4.5)
POTASSIUM SERPL-MCNC: 3.9 MMOL/L — SIGNIFICANT CHANGE UP (ref 3.5–5.3)
POTASSIUM SERPL-SCNC: 3.9 MMOL/L — SIGNIFICANT CHANGE UP (ref 3.5–5.3)
PTH-INTACT FLD-MCNC: 142 PG/ML — HIGH (ref 15–65)
SODIUM SERPL-SCNC: 138 MMOL/L — SIGNIFICANT CHANGE UP (ref 135–145)

## 2021-10-26 PROCEDURE — 77001 FLUOROGUIDE FOR VEIN DEVICE: CPT | Mod: 26

## 2021-10-26 PROCEDURE — 36558 INSERT TUNNELED CV CATH: CPT

## 2021-10-26 PROCEDURE — 99232 SBSQ HOSP IP/OBS MODERATE 35: CPT

## 2021-10-26 RX ORDER — OXYCODONE HYDROCHLORIDE 5 MG/1
10 TABLET ORAL ONCE
Refills: 0 | Status: DISCONTINUED | OUTPATIENT
Start: 2021-10-26 | End: 2021-10-26

## 2021-10-26 RX ORDER — SODIUM CHLORIDE 9 MG/ML
1000 INJECTION, SOLUTION INTRAVENOUS
Refills: 0 | Status: DISCONTINUED | OUTPATIENT
Start: 2021-10-26 | End: 2021-10-26

## 2021-10-26 RX ORDER — FENTANYL CITRATE 50 UG/ML
25 INJECTION INTRAVENOUS
Refills: 0 | Status: DISCONTINUED | OUTPATIENT
Start: 2021-10-26 | End: 2021-10-26

## 2021-10-26 RX ORDER — ONDANSETRON 8 MG/1
4 TABLET, FILM COATED ORAL ONCE
Refills: 0 | Status: DISCONTINUED | OUTPATIENT
Start: 2021-10-26 | End: 2021-10-26

## 2021-10-26 RX ORDER — FENTANYL CITRATE 50 UG/ML
50 INJECTION INTRAVENOUS
Refills: 0 | Status: DISCONTINUED | OUTPATIENT
Start: 2021-10-26 | End: 2021-10-26

## 2021-10-26 RX ORDER — MEPERIDINE HYDROCHLORIDE 50 MG/ML
12.5 INJECTION INTRAMUSCULAR; INTRAVENOUS; SUBCUTANEOUS
Refills: 0 | Status: DISCONTINUED | OUTPATIENT
Start: 2021-10-26 | End: 2021-10-26

## 2021-10-26 RX ADMIN — PANTOPRAZOLE SODIUM 40 MILLIGRAM(S): 20 TABLET, DELAYED RELEASE ORAL at 06:29

## 2021-10-26 RX ADMIN — Medication 200 MILLIGRAM(S): at 11:05

## 2021-10-26 RX ADMIN — OXYCODONE AND ACETAMINOPHEN 2 TABLET(S): 5; 325 TABLET ORAL at 02:40

## 2021-10-26 RX ADMIN — ALBUTEROL 2 PUFF(S): 90 AEROSOL, METERED ORAL at 20:29

## 2021-10-26 RX ADMIN — Medication 1334 MILLIGRAM(S): at 11:06

## 2021-10-26 RX ADMIN — Medication 2 MILLIGRAM(S): at 23:34

## 2021-10-26 RX ADMIN — Medication 1 APPLICATION(S): at 10:48

## 2021-10-26 RX ADMIN — Medication 1334 MILLIGRAM(S): at 17:39

## 2021-10-26 RX ADMIN — ALBUTEROL 2 PUFF(S): 90 AEROSOL, METERED ORAL at 13:48

## 2021-10-26 RX ADMIN — GABAPENTIN 100 MILLIGRAM(S): 400 CAPSULE ORAL at 21:03

## 2021-10-26 RX ADMIN — Medication 30 MILLIGRAM(S): at 11:03

## 2021-10-26 RX ADMIN — HEPARIN SODIUM 5000 UNIT(S): 5000 INJECTION INTRAVENOUS; SUBCUTANEOUS at 11:03

## 2021-10-26 RX ADMIN — OXYCODONE AND ACETAMINOPHEN 2 TABLET(S): 5; 325 TABLET ORAL at 15:12

## 2021-10-26 RX ADMIN — Medication 2 MILLIGRAM(S): at 02:40

## 2021-10-26 RX ADMIN — AMLODIPINE BESYLATE 10 MILLIGRAM(S): 2.5 TABLET ORAL at 11:06

## 2021-10-26 RX ADMIN — ATOVAQUONE 1500 MILLIGRAM(S): 750 SUSPENSION ORAL at 11:06

## 2021-10-26 RX ADMIN — OXYCODONE AND ACETAMINOPHEN 2 TABLET(S): 5; 325 TABLET ORAL at 14:37

## 2021-10-26 RX ADMIN — Medication 650 MILLIGRAM(S): at 17:39

## 2021-10-26 RX ADMIN — OXYCODONE AND ACETAMINOPHEN 2 TABLET(S): 5; 325 TABLET ORAL at 21:40

## 2021-10-26 RX ADMIN — GABAPENTIN 100 MILLIGRAM(S): 400 CAPSULE ORAL at 14:34

## 2021-10-26 RX ADMIN — Medication 1 DROP(S): at 21:04

## 2021-10-26 RX ADMIN — Medication 1 DROP(S): at 10:59

## 2021-10-26 RX ADMIN — HEPARIN SODIUM 5000 UNIT(S): 5000 INJECTION INTRAVENOUS; SUBCUTANEOUS at 21:03

## 2021-10-26 RX ADMIN — Medication 0.1 MILLIGRAM(S): at 11:05

## 2021-10-26 RX ADMIN — Medication 30 MILLIGRAM(S): at 21:03

## 2021-10-26 RX ADMIN — ALBUTEROL 2 PUFF(S): 90 AEROSOL, METERED ORAL at 08:40

## 2021-10-26 RX ADMIN — OXYCODONE AND ACETAMINOPHEN 2 TABLET(S): 5; 325 TABLET ORAL at 21:03

## 2021-10-26 RX ADMIN — GABAPENTIN 100 MILLIGRAM(S): 400 CAPSULE ORAL at 06:28

## 2021-10-26 RX ADMIN — Medication 3 MILLIGRAM(S): at 23:35

## 2021-10-26 RX ADMIN — OXYCODONE AND ACETAMINOPHEN 2 TABLET(S): 5; 325 TABLET ORAL at 08:26

## 2021-10-26 RX ADMIN — Medication 1334 MILLIGRAM(S): at 15:11

## 2021-10-26 RX ADMIN — OXYCODONE AND ACETAMINOPHEN 2 TABLET(S): 5; 325 TABLET ORAL at 03:47

## 2021-10-26 NOTE — PROGRESS NOTE ADULT - SUBJECTIVE AND OBJECTIVE BOX
CHIEF COMPLAINT: Sent for elevated creatinine    INTERVAL HX: No acute events overnight. Patient seen and evaluated at bedside today. She reports feeling generally well, overall improved compared to admission, still with some mild generalized weakness but overall improved. Underwent successful placement of tunneled dialysis catheter today. Social Work and Case Management is aware of her need for an outpatient dialysis center and chair time. Patient was seen by Rheumatology and Nephrology, under consideration for Rituxan prior to discharge.     ROS: Multisystem review is comprehensively negative x 10 systems except as above    EXAM:  Vital Signs Last 24 Hrs  T(F): 97.2 (26 Oct 2021 10:57), Max: 97.9 (25 Oct 2021 23:08)  HR: 77 (26 Oct 2021 13:49) (77 - 88)  BP: 150/86 (26 Oct 2021 10:57) (111/63 - 150/86)  RR: 18 (26 Oct 2021 10:57) (14 - 18)  SpO2: 97% (26 Oct 2021 13:49) (95% - 100%)    Constitutional: No acute distress  HEENT: NCAT PERRL EOMI, oral lesion noted  Neck: No lymphadenopathy  Heart: S1 S2 normal, RRR  Lungs: CTA b/l  Abd: +BS Soft NT  Ext: Improving LE edema of B/L LEs  Skin: Healing sacral decubitus ulcer  Musculoskeletal: No active synovitis noted, + finger contractures  Neuro: Intact strength upper and lower extremities    LABS:          CBC 10/25                   8.6    10.85 )-----------( 428                    25.6     BMP 10/26    138  |  105  |  67  ----------------------------<  149  3.9   |  23  |  4.22    Ca 7.5 (Alb 2.2)  Phos 7.1  iPTH 142  25-OH-Vit D 8.2    LFTs 10/25    TPro  6.8  /  Alb  2.6<L>  /  TBili  0.2  /  DBili  <0.1  /  AST  22  /  ALT  25  /  AlkPhos  58      Coags 10/25    PT: 10.8 sec;   INR: 0.92 ratio;  PTT:24.7 sec    Iron 172 TIBC 215 Sat 80% Ferritin 1315      ESR 99 (10/12) --> 33 (10/25)  C3 67 C4 11 (10/13)   C-ANCA Ab (-) (10/15)  P-ANCA Ab (+) >1:1280 (10/15)  Atypical ANCA (-) (10/15)  MPO Ab (+), 47.7 (10/15)  Prot3 Ab (-), 17.0 (10/15)  MAE 1:1280  speckled (10/15)  RF < 10 (10/15)  CCP (-) (10/15)  SSA Ab >  8.0 (10/15)  SSB Ab 2.6 (10/15)  Histone Ab 6.0 (10/20)  DS DNA Ab 196 (10/15), 181 (10/23)    Rpt C3, C4, DS DNA, ANCA reflex MPO and PROT3 in process  Quantiferon gold in process    Urine drug screen upon admission (+) for marijuana and cocaine  UA N- Mod LE large bld 26-50 WBC >50 RBC bacteria TNTC Epi occ (10/11)  Ur prot/Cr 7.5 (10/13)    MICRO:  COVID 19 PCR 10/25: Neg  COVID 19 PCR 10/21: Neg  COVID 19 PCR 10/18: Neg  Bld Cx 10/15: NGTD  GI pathogens PCR 10/14: Neg  C.diff PCR 10/14: Neg  Bld Cx 10/13: Neg  Bld Cx 10/13: S.epi  Ur Cx 10/11: >100,000 CFU/mL Enterobacter, rather susceptible species    IMAGING:  CT chest WO 10/25: Patent central airways. Minimal left basilar atelectasis. Trace bilateral pleural effusions. No lymphadenopathy. Central line ending at junction SVC and right atrium. Heart size is normal. No pericardial effusion. Severe anasarca. S/P cholecystectomy. Bones within normal limits.    CXR 10/16: Frontal expiratory view of the chest shows the heart to be normal in size. The lungs show mild left base atelectasis and there is no evidence of pneumothorax nor pleural effusion.    MRI pelvis 10/13:  A decubitus ulcer is identified along the rightward aspect of the sacrum overlying the S4-coccyx levels. The soft tissue wound measures approximately 39 x 72 mm. The wound extends to the level of the subcutaneous fat and approaches the posterior cortex of the sacrum/coccyx. There is no associated abscess or drainable fluid collection. No increased STIR signal or loss of T1 hyperintense signal to suggest acute osteomyelitis. No acute fracture. No osteonecrosis. No focal muscle edema. Sacroiliac Joints Normal. Lower Lumbar Spine Normal. Moderate to large amount of pelvic free fluid.    CT A/P WO 10/12: Limited evaluation without intravenous contrast.4 mm right lower lobe nodule (2:7). New since previous exam. This is indeterminant. Subtle tree-in-bud type opacities in the right lower lobe almost completely resolved when compared to the previous exam from 7/23/2020. No hydronephrosis. 2 mm non-obstructing left renal mid segment calcification. Ill-defined left para-aortic soft tissue may represent lymphadenopathy. This is very poorly evaluated on this exam. Mildly enlarged bilateral pelvic and inguinal lymph nodes.. Further evaluation is needed contrast-enhanced examination is recommended.    CARDIAC TESTING:  TTE 10/15: Left ventricle is normal in size, wall thickness, wall motion and contractility. Estimated left ventricular ejection fraction is 50-55 %. Mild (1+) mitral regurgitation. Mild (1+) tricuspid valve regurgitation.    PROCEDURES:  Tunneled dialysis catheter placement 10/26: Temporary central venous catheter removal. Tunneled central venous catheter insertion with fluoroscopic guidance  Renal biopsy 10/18: 18G core of R kidney with CT guidance, adequate per pathology attending review. D-stat tract embolization    MEDS:  MEDICATIONS  (STANDING):  ALBUTerol    90 MICROgram(s) HFA Inhaler 2 Puff(s) Inhalation every 6 hours  amLODIPine   Tablet 10 milliGRAM(s) Oral daily  artificial  tears Solution 1 Drop(s) Both EYES two times a day  atovaquone  Suspension 1500 milliGRAM(s) Oral daily  calcium acetate 1334 milliGRAM(s) Oral three times a day with meals  cloNIDine 0.1 milliGRAM(s) Oral daily  collagenase Ointment 1 Application(s) Topical daily  epoetin amee-epbx (RETACRIT) Injectable 6000 Unit(s) IV Push <User Schedule>  gabapentin 100 milliGRAM(s) Oral three times a day  heparin   Injectable 5000 Unit(s) SubCutaneous every 12 hours  hydroxychloroquine 200 milliGRAM(s) Oral daily  influenza   Vaccine 0.5 milliLiter(s) IntraMuscular once  methylPREDNISolone sodium succinate Injectable 30 milliGRAM(s) IV Push two times a day  pantoprazole    Tablet 40 milliGRAM(s) Oral before breakfast    MEDICATIONS  (PRN):  acetaminophen   Tablet .. 650 milliGRAM(s) Oral every 6 hours PRN Temp greater or equal to 38C (100.4F), Mild Pain (1 - 3)  ALBUTerol    90 MICROgram(s) HFA Inhaler 1 Puff(s) Inhalation every 6 hours PRN SOB/Wheezing/Congestion  diphenhydrAMINE 25 milliGRAM(s) Oral every 4 hours PRN Rash and/or Itching  loperamide 2 milliGRAM(s) Oral three times a day PRN Diarrhea  melatonin 3 milliGRAM(s) Oral at bedtime PRN Insomnia  ondansetron Injectable 4 milliGRAM(s) IV Push every 8 hours PRN Nausea and/or Vomiting  oxycodone    5 mG/acetaminophen 325 mG 2 Tablet(s) Oral every 6 hours PRN Severe Pain (7 - 10)

## 2021-10-26 NOTE — PROGRESS NOTE ADULT - ASSESSMENT
41 year-old woman with HTN, COPD, depression, RA and SLE, sent for further evaluation after noted to have elevated Cr, hematuria, proteinuria > 7 grams, low C3/C4.    Acute renal failure in setting of RA, SLE  DS DNA elevated, SSA, SSB+, complements low, +P-ANCA. 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 Completed 3 days of pulse steroid therapy methylprednisolone 250mg. Underwent temporary dialysis catheter placement 10/22, had HD session thereafter and again 10/23. Tolerated that well. Lowered steroids back down to 1mg/kg daily starting 10/24. Now regularly on MWF dialysis. Temporary dialysis catheter removed today, 10/26, and replaced with a tunneled dialysis catheter. Had vein mapping done. Patient feels overall well aside from some generalized weakness and physical deconditioning.   - Planning for possible Rituxan prior to discharge, will defer to Rheumatology and Nephrology  - F/u quant gold (in process)  - F/u repeat ANCAs for trend (in process)  - Monitor glucose, maintain PPI while on steroid  - Continue HCQ 200mg daily, closer to wt based   - Mepron daily  - Consulted Vascular to assess re more long term dialysis access planning  -  / SW assisting with outpatient dialysis placement, chair time    CKD anemia  Hgb 8.6. Iron 172 TIBC 215 Sat 80% Ferritin 1315. Started on Epo 6000 units MWF with HD.  - Continue Epo    CKD bone mineral disease  Ca 7.5 (Alb 2.2)  Phos 7.1  iPTH 142  25-OH-Vit D 8.2. Defer management to Nephrology. Patient started on calcium acetate.  - Continue calcium acetate    HTN  BP controlled.   - On Norvasc    COPD  Breathing comfortably.   - On albuterol prn    Severe protein calorie malnutrition  Appreciate input from dietician  - Continue renal diet, trend weights    Sacral decubitus ulcer  Initially unstageable. Question as to whether there was skin/soft tissue infection upon admission given low grade fever she had. However, no obvious surrounding cellulitis. She was given empiric doses of ceftriaxone and doxycycline. MRI pelvis obtained, negative for osteomyelitis. ID consulted, recommended local wound care. S/P vancomycin 10/15-10/17. Completed 7-day course of ceftriaxone and doxycycline.       RESOLVED HOSPITAL PROBLEMS      L knee draining boil, possible skin and soft tissue infection (RESOLVED)  S/P vancomycin 10/15-10/17. Completed 7-day course of ceftriaxone and doxycycline.     Enterobacter urinary tract infection (RESOLVED)  Upon admission, patient with low grade fever, urinary frequency, abnormal UA with pyuria and microscopic hematuria. Placed on ceftriaxone (along with doxycyline she was receiving for possible skin soft tissue infection). UCx returned (+) for >100,000 CFU/mL Enterobacter, rather susceptible species. Completed 7-day course of ceftriaxone.           Diet: Renal diet  DVT px: Heparin subcut  Code status: Full  Dispo: Anticipate DC to home when clinically improved and inpatient workup complete, will need arrangements for outpatient HD center (Nephrology, ,  aware)

## 2021-10-26 NOTE — PROGRESS NOTE ADULT - SUBJECTIVE AND OBJECTIVE BOX
Patient is a 41y Female who reports no complaints as new.     MEDICATIONS  (STANDING):  ALBUTerol    90 MICROgram(s) HFA Inhaler 2 Puff(s) Inhalation every 6 hours  amLODIPine   Tablet 10 milliGRAM(s) Oral daily  artificial  tears Solution 1 Drop(s) Both EYES two times a day  atovaquone  Suspension 1500 milliGRAM(s) Oral daily  calcium acetate 1334 milliGRAM(s) Oral three times a day with meals  cloNIDine 0.1 milliGRAM(s) Oral daily  collagenase Ointment 1 Application(s) Topical daily  epoetin amee-epbx (RETACRIT) Injectable 6000 Unit(s) IV Push <User Schedule>  gabapentin 100 milliGRAM(s) Oral three times a day  heparin   Injectable 5000 Unit(s) SubCutaneous every 12 hours  hydroxychloroquine 200 milliGRAM(s) Oral daily  influenza   Vaccine 0.5 milliLiter(s) IntraMuscular once  methylPREDNISolone sodium succinate Injectable 30 milliGRAM(s) IV Push two times a day  pantoprazole    Tablet 40 milliGRAM(s) Oral before breakfast    MEDICATIONS  (PRN):  acetaminophen   Tablet .. 650 milliGRAM(s) Oral every 6 hours PRN Temp greater or equal to 38C (100.4F), Mild Pain (1 - 3)  ALBUTerol    90 MICROgram(s) HFA Inhaler 1 Puff(s) Inhalation every 6 hours PRN SOB/Wheezing/Congestion  diphenhydrAMINE 25 milliGRAM(s) Oral every 4 hours PRN Rash and/or Itching  loperamide 2 milliGRAM(s) Oral three times a day PRN Diarrhea  melatonin 3 milliGRAM(s) Oral at bedtime PRN Insomnia  ondansetron Injectable 4 milliGRAM(s) IV Push every 8 hours PRN Nausea and/or Vomiting  oxycodone    5 mG/acetaminophen 325 mG 2 Tablet(s) Oral every 6 hours PRN Severe Pain (7 - 10)        T(C): , Max: 36.6 (10-25-21 @ 23:08)  T(F): , Max: 97.9 (10-25-21 @ 23:08)  HR: 79 (10-26-21 @ 10:41)  BP: 130/86 (10-26-21 @ 10:41)  BP(mean): --  RR: 14 (10-26-21 @ 10:41)  SpO2: 100% (10-26-21 @ 10:41)  Wt(kg): --    10-26 @ 07:01  -  10-26 @ 12:00  --------------------------------------------------------  IN: 200 mL / OUT: 0 mL / NET: 200 mL      PHYSICAL EXAM:    Constitutional: NAD, frail. >>stated age  HEENT: dry MM  Neck: No LAD, No JVD  Respiratory: dist  Cardiovascular: S1 and S2  Extremities: chronic peripheral edema  Neurological: A/O x 3, no focal deficits  marcy cath R        LABS:                        8.6    10.85 )-----------( 428      ( 25 Oct 2021 12:19 )             25.6     25 Oct 2021 12:19    136    |  103    |  94     ----------------------------<  105    4.0     |  20     |  5.56   23 Oct 2021 08:45    137    |  104    |  92     ----------------------------<  201    3.9     |  18     |  6.44     Ca    7.8        25 Oct 2021 12:19  Ca    7.6        23 Oct 2021 08:45  Phos  8.7       23 Oct 2021 08:45    TPro  6.8    /  Alb  2.6    /  TBili  0.2    /  DBili  <0.1   /  AST  22     /  ALT  25     /  AlkPhos  58     25 Oct 2021 12:19  TPro  x      /  Alb  2.2    /  TBili  x      /  DBili  x      /  AST  x      /  ALT  x      /  AlkPhos  x      23 Oct 2021 08:45      Hepatitis B Surface Antibody: Nonreact (10-25 @ 15:49)      Urine Studies:          RADIOLOGY & ADDITIONAL STUDIES:

## 2021-10-26 NOTE — PROGRESS NOTE ADULT - ASSESSMENT
39 female with anemia, HTN, RA, SLE on hydroxychloroquine was told to go to ED for evaluation of elevated creatinine  Cr 10- was 5.8 where baseline < 1.0.     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

## 2021-10-27 ENCOUNTER — TRANSCRIPTION ENCOUNTER (OUTPATIENT)
Age: 41
End: 2021-10-27

## 2021-10-27 VITALS
TEMPERATURE: 98 F | RESPIRATION RATE: 16 BRPM | OXYGEN SATURATION: 98 % | DIASTOLIC BLOOD PRESSURE: 88 MMHG | SYSTOLIC BLOOD PRESSURE: 138 MMHG | HEART RATE: 93 BPM

## 2021-10-27 LAB
ANION GAP SERPL CALC-SCNC: 11 MMOL/L — SIGNIFICANT CHANGE UP (ref 5–17)
APPEARANCE UR: CLEAR — SIGNIFICANT CHANGE UP
BILIRUB UR-MCNC: NEGATIVE — SIGNIFICANT CHANGE UP
BUN SERPL-MCNC: 83 MG/DL — HIGH (ref 7–23)
CALCIUM SERPL-MCNC: 7.5 MG/DL — LOW (ref 8.5–10.1)
CHLORIDE SERPL-SCNC: 103 MMOL/L — SIGNIFICANT CHANGE UP (ref 96–108)
CO2 SERPL-SCNC: 22 MMOL/L — SIGNIFICANT CHANGE UP (ref 22–31)
COLOR SPEC: YELLOW — SIGNIFICANT CHANGE UP
CREAT ?TM UR-MCNC: 48 MG/DL — SIGNIFICANT CHANGE UP
CREAT SERPL-MCNC: 5.03 MG/DL — HIGH (ref 0.5–1.3)
DIFF PNL FLD: ABNORMAL
GAMMA INTERFERON BACKGROUND BLD IA-ACNC: 0.02 IU/ML — SIGNIFICANT CHANGE UP
GLUCOSE SERPL-MCNC: 160 MG/DL — HIGH (ref 70–99)
GLUCOSE UR QL: 100 MG/DL
HCT VFR BLD CALC: 22.1 % — LOW (ref 34.5–45)
HCT VFR BLD CALC: 22.9 % — LOW (ref 34.5–45)
HGB BLD-MCNC: 7.2 G/DL — LOW (ref 11.5–15.5)
HGB BLD-MCNC: 7.4 G/DL — LOW (ref 11.5–15.5)
KETONES UR-MCNC: NEGATIVE — SIGNIFICANT CHANGE UP
LEUKOCYTE ESTERASE UR-ACNC: NEGATIVE — SIGNIFICANT CHANGE UP
M TB IFN-G BLD-IMP: ABNORMAL
M TB IFN-G CD4+ BCKGRND COR BLD-ACNC: 0 IU/ML — SIGNIFICANT CHANGE UP
M TB IFN-G CD4+CD8+ BCKGRND COR BLD-ACNC: 0 IU/ML — SIGNIFICANT CHANGE UP
MCHC RBC-ENTMCNC: 27.3 PG — SIGNIFICANT CHANGE UP (ref 27–34)
MCHC RBC-ENTMCNC: 32.3 GM/DL — SIGNIFICANT CHANGE UP (ref 32–36)
MCV RBC AUTO: 84.5 FL — SIGNIFICANT CHANGE UP (ref 80–100)
MPO AB + PR3 PNL SER: SIGNIFICANT CHANGE UP
NITRITE UR-MCNC: NEGATIVE — SIGNIFICANT CHANGE UP
PH UR: 5 — SIGNIFICANT CHANGE UP (ref 5–8)
PHOSPHATE SERPL-MCNC: 7.3 MG/DL — HIGH (ref 2.5–4.5)
PLATELET # BLD AUTO: 348 K/UL — SIGNIFICANT CHANGE UP (ref 150–400)
POTASSIUM SERPL-MCNC: 4.1 MMOL/L — SIGNIFICANT CHANGE UP (ref 3.5–5.3)
POTASSIUM SERPL-SCNC: 4.1 MMOL/L — SIGNIFICANT CHANGE UP (ref 3.5–5.3)
PROT ?TM UR-MCNC: 510 MG/DL — HIGH (ref 0–12)
PROT UR-MCNC: 500 MG/DL
PROT/CREAT UR-RTO: 10.6 RATIO — HIGH (ref 0–0.2)
QUANT TB PLUS MITOGEN MINUS NIL: 0.01 IU/ML — SIGNIFICANT CHANGE UP
RBC # BLD: 2.71 M/UL — LOW (ref 3.8–5.2)
RBC # FLD: 15 % — HIGH (ref 10.3–14.5)
SODIUM SERPL-SCNC: 136 MMOL/L — SIGNIFICANT CHANGE UP (ref 135–145)
SP GR SPEC: 1.01 — SIGNIFICANT CHANGE UP (ref 1.01–1.02)
UROBILINOGEN FLD QL: NEGATIVE MG/DL — SIGNIFICANT CHANGE UP
WBC # BLD: 10.77 K/UL — HIGH (ref 3.8–10.5)
WBC # FLD AUTO: 10.77 K/UL — HIGH (ref 3.8–10.5)

## 2021-10-27 PROCEDURE — 99239 HOSP IP/OBS DSCHRG MGMT >30: CPT

## 2021-10-27 RX ORDER — SODIUM CHLORIDE 9 MG/ML
1000 INJECTION, SOLUTION INTRAVENOUS
Refills: 0 | Status: DISCONTINUED | OUTPATIENT
Start: 2021-10-27 | End: 2021-10-27

## 2021-10-27 RX ORDER — PANTOPRAZOLE SODIUM 20 MG/1
1 TABLET, DELAYED RELEASE ORAL
Qty: 30 | Refills: 0
Start: 2021-10-27

## 2021-10-27 RX ORDER — LOPERAMIDE HCL 2 MG
1 TABLET ORAL
Qty: 30 | Refills: 0
Start: 2021-10-27

## 2021-10-27 RX ORDER — ERYTHROPOIETIN 10000 [IU]/ML
6000 INJECTION, SOLUTION INTRAVENOUS; SUBCUTANEOUS
Qty: 0 | Refills: 0 | DISCHARGE
Start: 2021-10-27

## 2021-10-27 RX ORDER — AMLODIPINE BESYLATE 2.5 MG/1
1 TABLET ORAL
Qty: 30 | Refills: 3
Start: 2021-10-27

## 2021-10-27 RX ORDER — ERYTHROPOIETIN 10000 [IU]/ML
10000 INJECTION, SOLUTION INTRAVENOUS; SUBCUTANEOUS
Qty: 0 | Refills: 0 | DISCHARGE
Start: 2021-10-27

## 2021-10-27 RX ORDER — HYDROXYCHLOROQUINE SULFATE 200 MG
1 TABLET ORAL
Qty: 30 | Refills: 3
Start: 2021-10-27

## 2021-10-27 RX ORDER — LANOLIN ALCOHOL/MO/W.PET/CERES
1 CREAM (GRAM) TOPICAL
Qty: 15 | Refills: 0
Start: 2021-10-27

## 2021-10-27 RX ORDER — GABAPENTIN 400 MG/1
1 CAPSULE ORAL
Qty: 90 | Refills: 0
Start: 2021-10-27

## 2021-10-27 RX ORDER — ERYTHROPOIETIN 10000 [IU]/ML
10000 INJECTION, SOLUTION INTRAVENOUS; SUBCUTANEOUS
Refills: 0 | Status: DISCONTINUED | OUTPATIENT
Start: 2021-10-27 | End: 2021-10-27

## 2021-10-27 RX ORDER — ATOVAQUONE 750 MG/5ML
10 SUSPENSION ORAL
Qty: 300 | Refills: 3
Start: 2021-10-27

## 2021-10-27 RX ORDER — CALCIUM ACETATE 667 MG
2 TABLET ORAL
Qty: 180 | Refills: 3
Start: 2021-10-27

## 2021-10-27 RX ORDER — CALCIUM ACETATE 667 MG
2 TABLET ORAL
Qty: 0 | Refills: 0 | DISCHARGE
Start: 2021-10-27

## 2021-10-27 RX ADMIN — Medication 0.1 MILLIGRAM(S): at 12:41

## 2021-10-27 RX ADMIN — GABAPENTIN 100 MILLIGRAM(S): 400 CAPSULE ORAL at 05:49

## 2021-10-27 RX ADMIN — Medication 1334 MILLIGRAM(S): at 13:10

## 2021-10-27 RX ADMIN — OXYCODONE AND ACETAMINOPHEN 2 TABLET(S): 5; 325 TABLET ORAL at 03:30

## 2021-10-27 RX ADMIN — Medication 1334 MILLIGRAM(S): at 06:49

## 2021-10-27 RX ADMIN — AMLODIPINE BESYLATE 10 MILLIGRAM(S): 2.5 TABLET ORAL at 12:41

## 2021-10-27 RX ADMIN — OXYCODONE AND ACETAMINOPHEN 2 TABLET(S): 5; 325 TABLET ORAL at 12:41

## 2021-10-27 RX ADMIN — ERYTHROPOIETIN 10000 UNIT(S): 10000 INJECTION, SOLUTION INTRAVENOUS; SUBCUTANEOUS at 10:23

## 2021-10-27 RX ADMIN — ATOVAQUONE 1500 MILLIGRAM(S): 750 SUSPENSION ORAL at 13:17

## 2021-10-27 RX ADMIN — Medication 1334 MILLIGRAM(S): at 17:30

## 2021-10-27 RX ADMIN — PANTOPRAZOLE SODIUM 40 MILLIGRAM(S): 20 TABLET, DELAYED RELEASE ORAL at 06:49

## 2021-10-27 RX ADMIN — OXYCODONE AND ACETAMINOPHEN 2 TABLET(S): 5; 325 TABLET ORAL at 02:50

## 2021-10-27 RX ADMIN — Medication 200 MILLIGRAM(S): at 12:41

## 2021-10-27 RX ADMIN — GABAPENTIN 100 MILLIGRAM(S): 400 CAPSULE ORAL at 13:10

## 2021-10-27 NOTE — DISCHARGE NOTE NURSING/CASE MANAGEMENT/SOCIAL WORK - PATIENT PORTAL LINK FT
You can access the FollowMyHealth Patient Portal offered by [facility] by registering at the following website: [email or web address]. By joining [employer]’s FollowMyHealth portal, you will also be able to view your health information using other applications (apps) compatible with our system.

## 2021-10-27 NOTE — PROGRESS NOTE ADULT - PROVIDER SPECIALTY LIST ADULT
Hospitalist
Nephrology
Gastroenterology
Hospitalist
Nephrology
Rheumatology
Hospitalist
Infectious Disease
Nephrology
Rheumatology
Rheumatology
Hospitalist
Hospitalist

## 2021-10-27 NOTE — PROGRESS NOTE ADULT - SUBJECTIVE AND OBJECTIVE BOX
CHIEF COMPLAINT: Sent for elevated creatinine    INTERVAL HX: No acute events overnight. Patient seen and evaluated at bedside today. She reports feeling generally well, overall improved compared to admission. No complaints. Underwent successful dialysis session today via her newly placed tunneled dialysis catheter. She did receive transfusion of 1u PRBC with dialysis today for CKD anemia. No lightheadedness, dizziness, dyspnea, chest pain, abdominal pain. No overt bleeding. Patient was seen by Rheumatology and Nephrology. Plan is for possible Rituxan as outpatient after follow up with Rheumatology Dr. Del Rosario. Outpatient follow up with Primary Care and Nephrology as well. Medically cleared for discharge pending secured outpatient dialysis chair time. Social Work and Case Management is aware of her need for an outpatient dialysis center and chair time.     ROS: Multisystem review is comprehensively negative x 10 systems except as above    EXAM:  Vital Signs Last 24 Hrs  T(F): 97.8 (27 Oct 2021 12:23), Max: 98.6 (26 Oct 2021 15:49)  HR: 85 (27 Oct 2021 12:23) (78 - 105)  BP: 169/109 (27 Oct 2021 12:23) (132/90 - 169/109)  RR: 17 (27 Oct 2021 12:23) (15 - 18)  SpO2: 100% (27 Oct 2021 12:23) (96% - 100%)    Constitutional: No acute distress  HEENT: NCAT PERRL EOMI, oral lesion noted  Neck: No lymphadenopathy  Heart: S1 S2 normal, RRR  Lungs: CTA b/l  Abd: +BS Soft NT  Ext: Improving LE edema of B/L LEs  Skin: Healing sacral decubitus ulcer  Musculoskeletal: No active synovitis noted, + finger contractures  Neuro: Intact strength upper and lower extremities    LABS:          CBC 10/27                   7.4    10.77 )-----------( 348                    22.9     BMP 10/27    136  |  103  |  83  ----------------------------<  160  4.1   |  22  |  5.03    Ca 7.5 (Alb 2.2)  Phos 7.1  iPTH 142  25-OH-Vit D 8.2    LFTs 10/25    TPro  6.8  /  Alb  2.6<L>  /  TBili  0.2  /  DBili  <0.1  /  AST  22  /  ALT  25  /  AlkPhos  58      Coags 10/25    PT: 10.8 sec;   INR: 0.92 ratio;  PTT:24.7 sec    Iron 172 TIBC 215 Sat 80% Ferritin 1315      ESR 99 (10/12) --> 33 (10/25)  C3 67 (10/13) --> 75 (10/25)  C4 11 (10/13) --> 8 (10/25)  C-ANCA Ab (-) (10/15)  P-ANCA Ab (+) >1:1280 (10/15)  Atypical ANCA (-) (10/15)  MPO Ab (+), 47.7 (10/15)  Prot3 Ab (-), 17.0 (10/15)  MAE 1:1280  speckled (10/15)  RF < 10 (10/15)  CCP (-) (10/15)  SSA Ab >  8.0 (10/15)  SSB Ab 2.6 (10/15)  Histone Ab 6.0 (10/20)  DS DNA Ab 196 (10/15) --> 181 (10/23)  Repeat DS DNA, ANCA reflex MPO and PROT3 in process  Quantiferon gold 10/25: Indeterminate    Urine drug screen upon admission (+) for marijuana and cocaine  UA N- Mod LE large bld 26-50 WBC >50 RBC bacteria TNTC Epi occ (10/11)  Ur prot/Cr 7.5 (10/13) --> 10.6 (10/27)    MICRO:  COVID 19 PCR 10/25: Neg  COVID 19 PCR 10/21: Neg  COVID 19 PCR 10/18: Neg  Bld Cx 10/15: NGTD  GI pathogens PCR 10/14: Neg  C.diff PCR 10/14: Neg  Bld Cx 10/13: Neg  Bld Cx 10/13: S.epi  Ur Cx 10/11: >100,000 CFU/mL Enterobacter, rather susceptible species    IMAGING:  CT chest WO 10/25: Patent central airways. Minimal left basilar atelectasis. Trace bilateral pleural effusions. No lymphadenopathy. Central line ending at junction SVC and right atrium. Heart size is normal. No pericardial effusion. Severe anasarca. S/P cholecystectomy. Bones within normal limits.    CXR 10/16: Frontal expiratory view of the chest shows the heart to be normal in size. The lungs show mild left base atelectasis and there is no evidence of pneumothorax nor pleural effusion.    MRI pelvis 10/13:  A decubitus ulcer is identified along the rightward aspect of the sacrum overlying the S4-coccyx levels. The soft tissue wound measures approximately 39 x 72 mm. The wound extends to the level of the subcutaneous fat and approaches the posterior cortex of the sacrum/coccyx. There is no associated abscess or drainable fluid collection. No increased STIR signal or loss of T1 hyperintense signal to suggest acute osteomyelitis. No acute fracture. No osteonecrosis. No focal muscle edema. Sacroiliac Joints Normal. Lower Lumbar Spine Normal. Moderate to large amount of pelvic free fluid.    CT A/P WO 10/12: Limited evaluation without intravenous contrast.4 mm right lower lobe nodule (2:7). New since previous exam. This is indeterminant. Subtle tree-in-bud type opacities in the right lower lobe almost completely resolved when compared to the previous exam from 7/23/2020. No hydronephrosis. 2 mm non-obstructing left renal mid segment calcification. Ill-defined left para-aortic soft tissue may represent lymphadenopathy. This is very poorly evaluated on this exam. Mildly enlarged bilateral pelvic and inguinal lymph nodes.. Further evaluation is needed contrast-enhanced examination is recommended.    CARDIAC TESTING:  TTE 10/15: Left ventricle is normal in size, wall thickness, wall motion and contractility. Estimated left ventricular ejection fraction is 50-55 %. Mild (1+) mitral regurgitation. Mild (1+) tricuspid valve regurgitation.    PROCEDURES:  Tunneled dialysis catheter placement 10/26: Temporary central venous catheter removal. Tunneled central venous catheter insertion with fluoroscopic guidance  Renal biopsy 10/18: 18G core of R kidney with CT guidance, adequate per pathology attending review. D-stat tract embolization    MEDS:  MEDICATIONS  (STANDING):  ALBUTerol    90 MICROgram(s) HFA Inhaler 2 Puff(s) Inhalation every 6 hours  amLODIPine   Tablet 10 milliGRAM(s) Oral daily  artificial  tears Solution 1 Drop(s) Both EYES two times a day  atovaquone  Suspension 1500 milliGRAM(s) Oral daily  calcium acetate 1334 milliGRAM(s) Oral three times a day with meals  cloNIDine 0.1 milliGRAM(s) Oral daily  collagenase Ointment 1 Application(s) Topical daily  epoetin amee-epbx (RETACRIT) Injectable 35201 Unit(s) IV Push <User Schedule>  gabapentin 100 milliGRAM(s) Oral three times a day  heparin   Injectable 5000 Unit(s) SubCutaneous every 12 hours  hydroxychloroquine 200 milliGRAM(s) Oral daily  influenza   Vaccine 0.5 milliLiter(s) IntraMuscular once  methylPREDNISolone sodium succinate Injectable 30 milliGRAM(s) IV Push two times a day  pantoprazole    Tablet 40 milliGRAM(s) Oral before breakfast    MEDICATIONS  (PRN):  acetaminophen   Tablet .. 650 milliGRAM(s) Oral every 6 hours PRN Temp greater or equal to 38C (100.4F), Mild Pain (1 - 3)  ALBUTerol    90 MICROgram(s) HFA Inhaler 1 Puff(s) Inhalation every 6 hours PRN SOB/Wheezing/Congestion  diphenhydrAMINE 25 milliGRAM(s) Oral every 4 hours PRN Rash and/or Itching  loperamide 2 milliGRAM(s) Oral three times a day PRN Diarrhea  melatonin 3 milliGRAM(s) Oral at bedtime PRN Insomnia  ondansetron Injectable 4 milliGRAM(s) IV Push every 8 hours PRN Nausea and/or Vomiting  oxycodone    5 mG/acetaminophen 325 mG 2 Tablet(s) Oral every 6 hours PRN Severe Pain (7 - 10)

## 2021-10-27 NOTE — DISCHARGE NOTE PROVIDER - NSDCMRMEDTOKEN_GEN_ALL_CORE_FT
acetaminophen 325 mg oral tablet: 2 tab(s) orally every 4 hours, As needed, Temp greater or equal to 38C (100.4F), Mild Pain (1 - 3)  gabapentin 100 mg oral capsule: 1 cap(s) orally 3 times a day  hydroxychloroquine 200 mg oral tablet: 1 tab(s) orally 2 times a day  meloxicam 15 mg oral tablet: 1 tab(s) orally once a day   amLODIPine 10 mg oral tablet: 1 tab(s) orally once a day  atovaquone 750 mg/5 mL oral suspension: 10 milliliter(s) orally once a day  calcium acetate 667 mg oral tablet: 2 tab(s) orally 3 times a day  cloNIDine 0.1 mg oral tablet: 1 tab(s) orally once a day  epoetin amee: 12821 unit(s) subcutaneous Monday, Wednesday, and Friday at dialysis  gabapentin 100 mg oral capsule: 1 cap(s) orally 3 times a day MDD:Maximum daily dose 300  hydroxychloroquine 200 mg oral tablet: 1 tab(s) orally once a day MDD:200 daily  loperamide 2 mg oral capsule: 1 cap(s) orally 3 times a day, As needed, Diarrhea  melatonin 3 mg oral tablet: 1 tab(s) orally once a day (at bedtime), As needed, Insomnia  ocular lubricant ophthalmic solution: 1 drop(s) to each affected eye 2 times a day as needed for dry eyes  oxycodone-acetaminophen 5 mg-325 mg oral tablet: 2 tab(s) orally every 6 hours, As needed, Severe Pain (7 - 10) MDD:maximum daily dose: oxycodone 40mg; acetaminophen 2600mg  pantoprazole 40 mg oral delayed release tablet: 1 tab(s) orally once a day (before a meal)  predniSONE 50 mg oral tablet: 1 tab(s) orally once a day

## 2021-10-27 NOTE — DISCHARGE NOTE PROVIDER - NSDCCPCAREPLAN_GEN_ALL_CORE_FT
PT Name: Rebecca Crain  MR #: [id number]     PHYSICIAN QUERY -  ELECTROLYTE CLARIFICATION      CDS: Yelena Pereira RN     Contact information:[email or web address]    This form is a permanent document in the medical record.     Query Date: January 29, 2020    By submitting this query, we are merely seeking further clarification of documentation to reflect the severity of illness of your patient. Please utilize your independent clinical judgment when addressing the question(s) below.    The Medical record reflects the following:     Indicators   Supporting Clinical Findings Location in Medical Record   x Lab Value(s)  1/27  04:52 1/27  15:45 1/28   Calcium 6.9  6.7  6.7     Lab Results   x Treatment                    Medication calcium carbonate (OS-UNA) tablet 500 mg  Ordered Dose: 1,000 mg   Route: Oral   Frequency: Once MAR    Other       Provider, please specify the diagnosis or diagnoses that correspond(s) to the above indicators. Rom all that apply:    [  + ] Hypocalcemia   [   ] Other electrolyte disturbance (please specify): _______   [   ]  Clinically Undetermined       Please document in your progress notes daily for the duration of treatment until resolved, and include in your discharge summary.   PRINCIPAL DISCHARGE DIAGNOSIS  Diagnosis: Acute renal failure (ARF)  Assessment and Plan of Treatment: DS DNA elevated, SSA, SSB+, complements low, +P-ANCA. 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 Completed 3 days of pulse steroid therapy methylprednisolone 250mg. Underwent temporary dialysis catheter placement 10/22, had HD session thereafter and again 10/23. Tolerated that well. Lowered steroids back down to 1mg/kg daily starting 10/24. Now regularly on MWF dialysis. Temporary dialysis catheter removed 10/26 and replaced with a tunneled dialysis catheter. Had vein mapping done. Tolerating dialysis via new tunneled catheter. Patient feels overall well. Per Nephrology and Rheumatology, Rituxan to be deferred to outpatient. Further evaluation needed. Patient with indeterminate quantiferon gold testing. Repeat ANCAs for trend in-process. Monitor glucose, maintain PPI while on steroid. Continue HCQ 200mg daily, closer to wt based. Mepron daily. Consulted Vascular to assess re more long term dialysis access planning.  / SW assisting with outpatient dialysis placement, chair time. Outpatient Rheumatology follow up with Dr. Del Rosario 10/28. Primary Care follow up 11/1. Nephrology follow up and anticipated as well.      SECONDARY DISCHARGE DIAGNOSES  Diagnosis: Anemia in chronic kidney disease (CKD)  Assessment and Plan of Treatment: Hgb 8.6 earlier this admission. Iron 172 TIBC 215 Sat 80% Ferritin 1315. Started on Epo 6000 units MWF with HD. Today Hgb 7.4. Transfused 1u PRBC. Continue Epo 10,000 units MWF at dialysis.    Diagnosis: Renal osteodystrophy  Assessment and Plan of Treatment: Ca 7.5 (Alb 2.2)  Phos 7.1  iPTH 142  25-OH-Vit D 8.2. Defer management to Nephrology. Patient started on calcium acetate. Continue calcium acetate.    Diagnosis: UTI (urinary tract infection)  Assessment and Plan of Treatment: Upon admission, patient with low grade fever, urinary frequency, abnormal UA with pyuria and microscopic hematuria. Placed on ceftriaxone (along with doxycyline she was receiving for possible skin soft tissue infection). UCx returned (+) for >100,000 CFU/mL Enterobacter, rather susceptible species. Completed 7-day course of ceftriaxone.    Diagnosis: HTN (hypertension)  Assessment and Plan of Treatment: BP somewhat controlled. On Norvasc. Could consider switch to Procardia XL and up titration, will defer to Primary Care and Nephrologist outpatient.    Diagnosis: Decubitus ulcer  Assessment and Plan of Treatment: Initially unstageable. Question as to whether there was skin/soft tissue infection upon admission given low grade fever she had. However, no obvious surrounding cellulitis. She was given empiric doses of ceftriaxone and doxycycline. MRI pelvis obtained, negative for osteomyelitis. ID consulted, recommended local wound care. S/P vancomycin 10/15-10/17. Completed 7-day course of ceftriaxone and doxycycline. Continue wound care.    Diagnosis: Boil, knee  Assessment and Plan of Treatment: S/P vancomycin 10/15-10/17. Completed 7-day course of ceftriaxone and doxycycline.    Diagnosis: Severe protein-calorie malnutrition  Assessment and Plan of Treatment: Appreciate input from dietician. Continue renal diet, trend weights.

## 2021-10-27 NOTE — DISCHARGE NOTE PROVIDER - NSDCFUADDAPPT_GEN_ALL_CORE_FT
Dr Pearl Del Rosario  [phone] [address] [place] [address]   10/28/21 @ [facility]  Rheum

## 2021-10-27 NOTE — DISCHARGE NOTE PROVIDER - CARE PROVIDERS DIRECT ADDRESSES
,DirectAddress_Unknown,[email or web address],DirectAddress_Unknown

## 2021-10-27 NOTE — DISCHARGE NOTE PROVIDER - PROVIDER TOKENS
PROVIDER:[TOKEN:[[id number]],SCHEDULEDAPPT:[10/28/2021],ESTABLISHEDPATIENT:[T]],PROVIDER:[TOKEN:[[id number]],FOLLOWUP:[1 week],ESTABLISHEDPATIENT:[T]],FREE:[LAST:[Primary Care Provider],PHONE:[(   )    -],FAX:[(   )    -],ADDRESS:[Please follow up with your primary care provider within 1-2 weeks of discharge. You tell me you scheduled an appointment for 11/1/21.],SCHEDULEDAPPT:[11/01/2021]]

## 2021-10-27 NOTE — PROGRESS NOTE ADULT - ASSESSMENT
39 female with anemia, HTN, RA, SLE on hydroxychloroquine was told to go to ED for evaluation of elevated creatinine  Cr 10- was 5.8 where baseline < 1.0.     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    PRBC x1 at HD today - increase UF at HD today    TAMARA at HD    iron stores - IV iron at HD     d/w NP stamatos - Rheum - no rituxan at this point- will fup with Dr Del Rosario to decide this    Awaiting placement at outpt HD unit

## 2021-10-27 NOTE — DISCHARGE NOTE PROVIDER - CARE PROVIDER_API CALL
HERACLIO HOBSON  Rheumatology  [address] [place] [address], [address] [place] [address]  Phone: [phone]  Fax: [phone]  Established Patient  Scheduled Appointment: 10/28/2021    Blake Pena)  Internal Medicine; Nephrology  [address] [place] [address]  Phone: [phone]  Fax: [phone]  Established Patient  Follow Up Time: 1 week    Primary Care Provider,   Please follow up with your primary care provider within 1-2 weeks of discharge. You tell me you scheduled an appointment for 11/1/21.  Phone: (   )    -  Fax: (   )    -  Scheduled Appointment: 11/01/2021

## 2021-10-27 NOTE — DISCHARGE NOTE NURSING/CASE MANAGEMENT/SOCIAL WORK - NSDCFUADDAPPT_GEN_ALL_CORE_FT
Dr Pearl Del Rosario  [phone] [address]. [address] [place] [address]   10/28/21 @ [facility]  Rheum

## 2021-10-27 NOTE — DISCHARGE NOTE NURSING/CASE MANAGEMENT/SOCIAL WORK - NSDCCRNAME_GEN_ALL_CORE_FT
[facility] [id number] [place] [address] Hospit[place] at [address] [place] [address]

## 2021-10-27 NOTE — PROGRESS NOTE ADULT - REASON FOR ADMISSION
UTI  acute renal failure
UTI, acute renal failure
UTI  acute renal failure
UTI, Acute renal failure
UTI, acute renal failure
Acute renal failure
UTI  acute renal failure
UTI, acute renal failure
UTI  acute renal failure

## 2021-10-27 NOTE — DISCHARGE NOTE NURSING/CASE MANAGEMENT/SOCIAL WORK - NSDCCRTYPESERV_GEN_ALL_CORE_FT
You have been accepted for outpatient hemodialysis. First session Friday October 29, 2021 11:10am. Please arrive 30 minutes early. Your regular schedule will be Mondays, Wednesdays, and Fridays at 11:10am.   Outpatient dialysis. Your regular schedule will be Mondays, Wednesdays, and Fridays at 11:10am.

## 2021-10-27 NOTE — PROGRESS NOTE ADULT - NUTRITIONAL ASSESSMENT
This patient has been assessed with a concern for Malnutrition and has been determined to have a diagnosis/diagnoses of Severe protein-calorie malnutrition.    This patient is being managed with:   Diet Regular-  Entered: Oct 13 2021  3:19PM    
This patient has been assessed with a concern for Malnutrition and has been determined to have a diagnosis/diagnoses of Severe protein-calorie malnutrition.    This patient is being managed with:   Diet Regular-  Entered: Oct 18 2021  4:53PM    Diet NPO after Midnight-     NPO Start Date: 17-Oct-2021   NPO Start Time: 23:59  Entered: Oct 17 2021 11:18AM    
This patient has been assessed with a concern for Malnutrition and has been determined to have a diagnosis/diagnoses of Severe protein-calorie malnutrition.    This patient is being managed with:   Diet Regular-  Entered: Oct 18 2021  4:53PM    Diet NPO after Midnight-     NPO Start Date: 17-Oct-2021   NPO Start Time: 23:59  Entered: Oct 17 2021 11:18AM    
This patient has been assessed with a concern for Malnutrition and has been determined to have a diagnosis/diagnoses of Severe protein-calorie malnutrition.    This patient is being managed with:   Diet Renal Restrictions-  For patients receiving Renal Replacement - No Protein Restr No Conc K No Conc Phos Low Sodium  Entered: Oct 19 2021  5:12PM    
This patient has been assessed with a concern for Malnutrition and has been determined to have a diagnosis/diagnoses of Severe protein-calorie malnutrition.    This patient is being managed with:   Diet Renal Restrictions-  For patients receiving Renal Replacement - No Protein Restr No Conc K No Conc Phos Low Sodium  Entered: Oct 19 2021  5:12PM    
This patient has been assessed with a concern for Malnutrition and has been determined to have a diagnosis/diagnoses of Severe protein-calorie malnutrition.    This patient is being managed with:   Diet Renal Restrictions-  For patients receiving Renal Replacement - No Protein Restr No Conc K No Conc Phos Low Sodium  Entered: Oct 19 2021  5:12PM    Diet NPO after Midnight-     NPO Start Date: 17-Oct-2021   NPO Start Time: 23:59  Entered: Oct 17 2021 11:18AM    
This patient has been assessed with a concern for Malnutrition and has been determined to have a diagnosis/diagnoses of Severe protein-calorie malnutrition.    This patient is being managed with:   Diet Renal Restrictions-  For patients receiving Renal Replacement - No Protein Restr No Conc K No Conc Phos Low Sodium  Entered: Oct 19 2021  5:12PM    
This patient has been assessed with a concern for Malnutrition and has been determined to have a diagnosis/diagnoses of Severe protein-calorie malnutrition.    This patient is being managed with:   Diet Renal Restrictions-  For patients receiving Renal Replacement - No Protein Restr No Conc K No Conc Phos Low Sodium  Entered: Oct 19 2021  5:12PM    
This patient has been assessed with a concern for Malnutrition and has been determined to have a diagnosis/diagnoses of Severe protein-calorie malnutrition.    This patient is being managed with:   Diet NPO after Midnight-     NPO Start Date: 17-Oct-2021   NPO Start Time: 23:59  Entered: Oct 17 2021 11:18AM    Diet Regular-  Entered: Oct 13 2021  3:19PM    
This patient has been assessed with a concern for Malnutrition and has been determined to have a diagnosis/diagnoses of Severe protein-calorie malnutrition.    This patient is being managed with:   Diet Regular-  Entered: Oct 13 2021  3:19PM    
This patient has been assessed with a concern for Malnutrition and has been determined to have a diagnosis/diagnoses of Severe protein-calorie malnutrition.    This patient is being managed with:   Diet Renal Restrictions-  For patients receiving Renal Replacement - No Protein Restr No Conc K No Conc Phos Low Sodium  Entered: Oct 19 2021  5:12PM    
This patient has been assessed with a concern for Malnutrition and has been determined to have a diagnosis/diagnoses of Severe protein-calorie malnutrition.    This patient is being managed with:   Diet NPO after Midnight-     NPO Start Date: 25-Oct-2021   NPO Start Time: 23:59  Except Medications  Entered: Oct 25 2021 11:59AM    Diet Renal Restrictions-  For patients receiving Renal Replacement - No Protein Restr No Conc K No Conc Phos Low Sodium  Entered: Oct 19 2021  5:12PM    
This patient has been assessed with a concern for Malnutrition and has been determined to have a diagnosis/diagnoses of Severe protein-calorie malnutrition.    This patient is being managed with:   Diet Renal Restrictions-  For patients receiving Renal Replacement - No Protein Restr No Conc K No Conc Phos Low Sodium  Entered: Oct 19 2021  5:12PM    Diet NPO after Midnight-     NPO Start Date: 17-Oct-2021   NPO Start Time: 23:59  Entered: Oct 17 2021 11:18AM

## 2021-10-27 NOTE — PROGRESS NOTE ADULT - ASSESSMENT
41 year-old woman with HTN, COPD, depression, RA and SLE, sent for further evaluation after noted to have elevated Cr, hematuria, proteinuria > 7 grams, low C3/C4.    Acute renal failure in setting of RA, SLE  DS DNA elevated, SSA, SSB+, complements low, +P-ANCA. 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 Completed 3 days of pulse steroid therapy methylprednisolone 250mg. Underwent temporary dialysis catheter placement 10/22, had HD session thereafter and again 10/23. Tolerated that well. Lowered steroids back down to 1mg/kg daily starting 10/24. Now regularly on MWF dialysis. Temporary dialysis catheter removed 10/26 and replaced with a tunneled dialysis catheter. Had vein mapping done. Tolerating dialysis via new tunneled catheter. Patient feels overall well. Per Nephrology and Rheumatology, Rituxan to be deferred to outpatient. Further evaluation needed. Patient with indeterminate quantiferon gold testing. Repeat ANCAs for trend in-process. Monitor glucose, maintain PPI while on steroid. Continue HCQ 200mg daily, closer to wt based. Mepron daily. Consulted Vascular to assess re more long term dialysis access planning.  / SW assisting with outpatient dialysis placement, chair time. Outpatient Rheumatology follow up with Dr. Del Rosario 10/28. Primary Care follow up 11/1. Nephrology follow up and anticipated as well.     CKD anemia  Hgb 8.6 earlier this admission. Iron 172 TIBC 215 Sat 80% Ferritin 1315. Started on Epo 6000 units MWF with HD. Today Hgb 7.4. Transfused 1u PRBC. Continue Epo.    CKD bone mineral disease  Ca 7.5 (Alb 2.2)  Phos 7.1  iPTH 142  25-OH-Vit D 8.2. Defer management to Nephrology. Patient started on calcium acetate. Continue calcium acetate.    HTN  BP somewhat controlled. On Norvasc. Could consider switch to Procardia XL and up titration, will defer to Primary Care and Nephrologist outpatient.     COPD  Breathing comfortably. On albuterol prn.    Severe protein calorie malnutrition  Appreciate input from dietician. Continue renal diet, trend weights.    Sacral decubitus ulcer  Initially unstageable. Question as to whether there was skin/soft tissue infection upon admission given low grade fever she had. However, no obvious surrounding cellulitis. She was given empiric doses of ceftriaxone and doxycycline. MRI pelvis obtained, negative for osteomyelitis. ID consulted, recommended local wound care. S/P vancomycin 10/15-10/17. Completed 7-day course of ceftriaxone and doxycycline.     RESOLVED HOSPITAL PROBLEMS    L knee draining boil, possible skin and soft tissue infection (RESOLVED)  S/P vancomycin 10/15-10/17. Completed 7-day course of ceftriaxone and doxycycline.     Enterobacter urinary tract infection (RESOLVED)  Upon admission, patient with low grade fever, urinary frequency, abnormal UA with pyuria and microscopic hematuria. Placed on ceftriaxone (along with doxycyline she was receiving for possible skin soft tissue infection). UCx returned (+) for >100,000 CFU/mL Enterobacter, rather susceptible species. Completed 7-day course of ceftriaxone.       Diet: Renal diet  DVT px: Heparin subcut  Code status: Full  Dispo: Medically cleared for discharge home pending secured outpatient dialysis chair time.

## 2021-10-27 NOTE — DISCHARGE NOTE PROVIDER - HOSPITAL COURSE
41 year-old woman with HTN, COPD, depression, RA and SLE, sent for further evaluation after noted to have elevated Cr, hematuria, proteinuria > 7 grams, low C3/C4.    Acute renal failure in setting of RA, SLE  DS DNA elevated, SSA, SSB+, complements low, +P-ANCA. 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 Completed 3 days of pulse steroid therapy methylprednisolone 250mg. Underwent temporary dialysis catheter placement 10/22, had HD session thereafter and again 10/23. Tolerated that well. Lowered steroids back down to 1mg/kg daily starting 10/24. Now regularly on MWF dialysis. Temporary dialysis catheter removed 10/26 and replaced with a tunneled dialysis catheter. Had vein mapping done. Tolerating dialysis via new tunneled catheter. Patient feels overall well. Per Nephrology and Rheumatology, Rituxan to be deferred to outpatient. Further evaluation needed. Patient with indeterminate quantiferon gold testing. Repeat ANCAs for trend in-process. Monitor glucose, maintain PPI while on steroid. Continue HCQ 200mg daily, closer to wt based. Mepron daily. Consulted Vascular to assess re more long term dialysis access planning.  / SW assisting with outpatient dialysis placement, chair time. Outpatient Rheumatology follow up with Dr. Del Rosario 10/28. Primary Care follow up 11/1. Nephrology follow up and anticipated as well.     CKD anemia  Hgb 8.6 earlier this admission. Iron 172 TIBC 215 Sat 80% Ferritin 1315. Started on Epo 6000 units MWF with HD. Today Hgb 7.4. Transfused 1u PRBC. Continue Epo 10,000 units MWF at dialysis.    CKD bone mineral disease  Ca 7.5 (Alb 2.2)  Phos 7.1  iPTH 142  25-OH-Vit D 8.2. Defer management to Nephrology. Patient started on calcium acetate. Continue calcium acetate.    HTN  BP somewhat controlled. On Norvasc. Could consider switch to Procardia XL and up titration, will defer to Primary Care and Nephrologist outpatient.     COPD  Breathing comfortably. On albuterol prn.    Severe protein calorie malnutrition  Appreciate input from dietician. Continue renal diet, trend weights.    Sacral decubitus ulcer  Initially unstageable. Question as to whether there was skin/soft tissue infection upon admission given low grade fever she had. However, no obvious surrounding cellulitis. She was given empiric doses of ceftriaxone and doxycycline. MRI pelvis obtained, negative for osteomyelitis. ID consulted, recommended local wound care. S/P vancomycin 10/15-10/17. Completed 7-day course of ceftriaxone and doxycycline.     RESOLVED HOSPITAL PROBLEMS    L knee draining boil, possible skin and soft tissue infection (RESOLVED)  S/P vancomycin 10/15-10/17. Completed 7-day course of ceftriaxone and doxycycline.     Enterobacter urinary tract infection (RESOLVED)  Upon admission, patient with low grade fever, urinary frequency, abnormal UA with pyuria and microscopic hematuria. Placed on ceftriaxone (along with doxycyline she was receiving for possible skin soft tissue infection). UCx returned (+) for >100,000 CFU/mL Enterobacter, rather susceptible species. Completed 7-day course of ceftriaxone.         DISCHARGE EXAM:  Afebrile  /90  HR 78  RR 16  O2 100% on RA  Constitutional: No acute distress  HEENT: NCAT PERRL EOMI, oral lesion noted  Neck: No lymphadenopathy  Heart: S1 S2 normal, RRR  Lungs: CTA b/l  Abd: +BS Soft NT  Ext: Improving LE edema of B/L LEs  Skin: Healing sacral decubitus ulcer  Musculoskeletal: No active synovitis noted, + finger contractures  Neuro: Intact strength upper and lower extremities

## 2021-10-27 NOTE — DISCHARGE NOTE PROVIDER - NSDCPNSUBOBJ_GEN_ALL_CORE
LABS:          CBC 10/27                   7.4    10.77 )-----------( 348                    22.9     BMP 10/27    136  |  103  |  83  ----------------------------<  160  4.1   |  22  |  5.03    Ca 7.5 (Alb 2.2)  Phos 7.1  iPTH 142  25-OH-Vit D 8.2    LFTs 10/25    TPro  6.8  /  Alb  2.6<L>  /  TBili  0.2  /  DBili  <0.1  /  AST  22  /  ALT  25  /  AlkPhos  58      Coags 10/25    PT: 10.8 sec;   INR: 0.92 ratio;  PTT:24.7 sec    Iron 172 TIBC 215 Sat 80% Ferritin 1315      ESR 99 (10/12) --> 33 (10/25)  C3 67 (10/13) --> 75 (10/25)  C4 11 (10/13) --> 8 (10/25)  C-ANCA Ab (-) (10/15)  P-ANCA Ab (+) >1:1280 (10/15)  Atypical ANCA (-) (10/15)  MPO Ab (+), 47.7 (10/15)  Prot3 Ab (-), 17.0 (10/15)  MAE 1:1280  speckled (10/15)  RF < 10 (10/15)  CCP (-) (10/15)  SSA Ab >  8.0 (10/15)  SSB Ab 2.6 (10/15)  Histone Ab 6.0 (10/20)  DS DNA Ab 196 (10/15) --> 181 (10/23)  Repeat DS DNA, ANCA reflex MPO and PROT3 in process  Quantiferon gold 10/25: Indeterminate    Urine drug screen upon admission (+) for marijuana and cocaine  UA N- Mod LE large bld 26-50 WBC >50 RBC bacteria TNTC Epi occ (10/11)  Ur prot/Cr 7.5 (10/13) --> 10.6 (10/27)    MICRO:  COVID 19 PCR 10/25: Neg  COVID 19 PCR 10/21: Neg  COVID 19 PCR 10/18: Neg  Bld Cx 10/15: NGTD  GI pathogens PCR 10/14: Neg  C.diff PCR 10/14: Neg  Bld Cx 10/13: Neg  Bld Cx 10/13: S.epi  Ur Cx 10/11: >100,000 CFU/mL Enterobacter, rather susceptible species    IMAGING:  CT chest WO 10/25: Patent central airways. Minimal left basilar atelectasis. Trace bilateral pleural effusions. No lymphadenopathy. Central line ending at junction SVC and right atrium. Heart size is normal. No pericardial effusion. Severe anasarca. S/P cholecystectomy. Bones within normal limits.    CXR 10/16: Frontal expiratory view of the chest shows the heart to be normal in size. The lungs show mild left base atelectasis and there is no evidence of pneumothorax nor pleural effusion.    MRI pelvis 10/13:  A decubitus ulcer is identified along the rightward aspect of the sacrum overlying the S4-coccyx levels. The soft tissue wound measures approximately 39 x 72 mm. The wound extends to the level of the subcutaneous fat and approaches the posterior cortex of the sacrum/coccyx. There is no associated abscess or drainable fluid collection. No increased STIR signal or loss of T1 hyperintense signal to suggest acute osteomyelitis. No acute fracture. No osteonecrosis. No focal muscle edema. Sacroiliac Joints Normal. Lower Lumbar Spine Normal. Moderate to large amount of pelvic free fluid.    CT A/P WO 10/12: Limited evaluation without intravenous contrast.4 mm right lower lobe nodule (2:7). New since previous exam. This is indeterminant. Subtle tree-in-bud type opacities in the right lower lobe almost completely resolved when compared to the previous exam from 7/23/2020. No hydronephrosis. 2 mm non-obstructing left renal mid segment calcification. Ill-defined left para-aortic soft tissue may represent lymphadenopathy. This is very poorly evaluated on this exam. Mildly enlarged bilateral pelvic and inguinal lymph nodes.. Further evaluation is needed contrast-enhanced examination is recommended.    CARDIAC TESTING:  TTE 10/15: Left ventricle is normal in size, wall thickness, wall motion and contractility. Estimated left ventricular ejection fraction is 50-55 %. Mild (1+) mitral regurgitation. Mild (1+) tricuspid valve regurgitation.    PROCEDURES:  Tunneled dialysis catheter placement 10/26: Temporary central venous catheter removal. Tunneled central venous catheter insertion with fluoroscopic guidance  Renal biopsy 10/18: 18G core of R kidney with CT guidance, adequate per pathology attending review. D-stat tract embolization

## 2021-10-27 NOTE — PROGRESS NOTE ADULT - SUBJECTIVE AND OBJECTIVE BOX
Patient is a 41y Female who reports no complaints as new.   seen on HD   feeling swollen     uf goal 1.5 liters increased at HD     MEDICATIONS  (STANDING):  ALBUTerol    90 MICROgram(s) HFA Inhaler 2 Puff(s) Inhalation every 6 hours  amLODIPine   Tablet 10 milliGRAM(s) Oral daily  artificial  tears Solution 1 Drop(s) Both EYES two times a day  atovaquone  Suspension 1500 milliGRAM(s) Oral daily  calcium acetate 1334 milliGRAM(s) Oral three times a day with meals  cloNIDine 0.1 milliGRAM(s) Oral daily  collagenase Ointment 1 Application(s) Topical daily  epoetin amee-epbx (RETACRIT) Injectable 87927 Unit(s) IV Push <User Schedule>  gabapentin 100 milliGRAM(s) Oral three times a day  heparin   Injectable 5000 Unit(s) SubCutaneous every 12 hours  hydroxychloroquine 200 milliGRAM(s) Oral daily  influenza   Vaccine 0.5 milliLiter(s) IntraMuscular once  methylPREDNISolone sodium succinate Injectable 30 milliGRAM(s) IV Push two times a day  pantoprazole    Tablet 40 milliGRAM(s) Oral before breakfast      Vital Signs Last 24 Hrs  T(C): 36.4 (27 Oct 2021 07:47), Max: 37 (26 Oct 2021 15:49)  T(F): 97.6 (27 Oct 2021 07:47), Max: 98.6 (26 Oct 2021 15:49)  HR: 79 (27 Oct 2021 10:53) (77 - 105)  BP: 145/94 (27 Oct 2021 10:53) (132/90 - 154/97)  BP(mean): --  RR: 17 (27 Oct 2021 10:53) (15 - 18)  SpO2: 96% (27 Oct 2021 06:00) (96% - 99%)    PHYSICAL EXAM:    Constitutional: NAD, frail. >>stated age  HEENT: dry MM  Neck: No LAD, No JVD  Respiratory: dist  Cardiovascular: S1 and S2  Extremities: chronic peripheral edema  Neurological: A/O x 3, no focal deficits  marcy cath R - mild oozing         LABS:                          7.2    x     )-----------( x        ( 27 Oct 2021 09:14 )             22.1                         7.4    10.77 )-----------( 348      ( 27 Oct 2021 08:03 )             22.9         136    |  103    |  83     ----------------------------<  160       27 Oct 2021 08:03  4.1     |  22     |  5.03     138    |  105    |  67     ----------------------------<  149       26 Oct 2021 12:24  3.9     |  23     |  4.22     136    |  103    |  94     ----------------------------<  105       25 Oct 2021 12:19  4.0     |  20     |  5.56     Ca    7.5        27 Oct 2021 08:03  Ca    7.5        26 Oct 2021 12:24    Phos  7.3       27 Oct 2021 08:03  Phos  7.1       26 Oct 2021 12:24      TPro  6.8    /  Alb  2.6    /  TBili  0.2    /        25 Oct 2021 12:19  DBili  <0.1   /  AST  22     /  ALT  25     /  AlkPhos  58         10/19  MPO 50     urine protein 7 grams        Hepatitis B Surface Antibody: Nonreact (10-25 @ 15:49)      Urine Studies:          RADIOLOGY & ADDITIONAL STUDIES:

## 2021-10-28 LAB
AUTO DIFF PNL BLD: NEGATIVE — SIGNIFICANT CHANGE UP
C-ANCA SER-ACNC: NEGATIVE — SIGNIFICANT CHANGE UP
O2 CT VFR BLD CALC: ABNORMAL
P-ANCA SER-ACNC: POSITIVE

## 2021-10-29 LAB
MYELOPEROXIDASE AB SER-ACNC: 42.5 UNITS — HIGH
MYELOPEROXIDASE CELLS FLD QL: POSITIVE
PROTEINASE3 AB FLD-ACNC: 13.8 UNITS — SIGNIFICANT CHANGE UP
PROTEINASE3 AB SER-ACNC: NEGATIVE — SIGNIFICANT CHANGE UP

## 2021-11-01 ENCOUNTER — OUTPATIENT (OUTPATIENT)
Dept: OUTPATIENT SERVICES | Facility: HOSPITAL | Age: 41
LOS: 1 days | End: 2021-11-01
Payer: MEDICAID

## 2021-11-01 DIAGNOSIS — K80.20 CALCULUS OF GALLBLADDER WITHOUT CHOLECYSTITIS WITHOUT OBSTRUCTION: Chronic | ICD-10-CM

## 2021-11-01 DIAGNOSIS — Z98.51 TUBAL LIGATION STATUS: Chronic | ICD-10-CM

## 2021-11-01 DIAGNOSIS — Z87.19 PERSONAL HISTORY OF OTHER DISEASES OF THE DIGESTIVE SYSTEM: Chronic | ICD-10-CM

## 2021-11-05 NOTE — CHART NOTE - NSCHARTNOTESELECT_GEN_ALL_CORE
CCM
Dietitian F/U w/ TF recommendations
Event Note
Malnutrition Evaluation
dietitian f/u note
no

## 2021-11-10 DIAGNOSIS — M06.9 RHEUMATOID ARTHRITIS, UNSPECIFIED: ICD-10-CM

## 2021-11-10 DIAGNOSIS — L89.150 PRESSURE ULCER OF SACRAL REGION, UNSTAGEABLE: ICD-10-CM

## 2021-11-10 DIAGNOSIS — M32.9 SYSTEMIC LUPUS ERYTHEMATOSUS, UNSPECIFIED: ICD-10-CM

## 2021-11-10 DIAGNOSIS — N17.9 ACUTE KIDNEY FAILURE, UNSPECIFIED: ICD-10-CM

## 2021-11-10 DIAGNOSIS — B96.89 OTHER SPECIFIED BACTERIAL AGENTS AS THE CAUSE OF DISEASES CLASSIFIED ELSEWHERE: ICD-10-CM

## 2021-11-10 DIAGNOSIS — F12.90 CANNABIS USE, UNSPECIFIED, UNCOMPLICATED: ICD-10-CM

## 2021-11-10 DIAGNOSIS — J98.11 ATELECTASIS: ICD-10-CM

## 2021-11-10 DIAGNOSIS — N00.7 ACUTE NEPHRITIC SYNDROME WITH DIFFUSE CRESCENTIC GLOMERULONEPHRITIS: ICD-10-CM

## 2021-11-10 DIAGNOSIS — D63.1 ANEMIA IN CHRONIC KIDNEY DISEASE: ICD-10-CM

## 2021-11-10 DIAGNOSIS — Z91.81 HISTORY OF FALLING: ICD-10-CM

## 2021-11-10 DIAGNOSIS — K14.0 GLOSSITIS: ICD-10-CM

## 2021-11-10 DIAGNOSIS — Z87.891 PERSONAL HISTORY OF NICOTINE DEPENDENCE: ICD-10-CM

## 2021-11-10 DIAGNOSIS — F14.10 COCAINE ABUSE, UNCOMPLICATED: ICD-10-CM

## 2021-11-10 DIAGNOSIS — I10 ESSENTIAL (PRIMARY) HYPERTENSION: ICD-10-CM

## 2021-11-10 DIAGNOSIS — N39.0 URINARY TRACT INFECTION, SITE NOT SPECIFIED: ICD-10-CM

## 2021-11-10 DIAGNOSIS — H04.129 DRY EYE SYNDROME OF UNSPECIFIED LACRIMAL GLAND: ICD-10-CM

## 2021-11-10 DIAGNOSIS — R31.9 HEMATURIA, UNSPECIFIED: ICD-10-CM

## 2021-11-10 DIAGNOSIS — D69.6 THROMBOCYTOPENIA, UNSPECIFIED: ICD-10-CM

## 2021-11-10 DIAGNOSIS — F11.20 OPIOID DEPENDENCE, UNCOMPLICATED: ICD-10-CM

## 2021-11-10 DIAGNOSIS — G89.29 OTHER CHRONIC PAIN: ICD-10-CM

## 2021-11-10 DIAGNOSIS — E87.2 ACIDOSIS: ICD-10-CM

## 2021-11-10 DIAGNOSIS — L02.426 FURUNCLE OF LEFT LOWER LIMB: ICD-10-CM

## 2021-11-10 DIAGNOSIS — E83.39 OTHER DISORDERS OF PHOSPHORUS METABOLISM: ICD-10-CM

## 2021-11-10 DIAGNOSIS — J44.9 CHRONIC OBSTRUCTIVE PULMONARY DISEASE, UNSPECIFIED: ICD-10-CM

## 2021-11-10 DIAGNOSIS — E43 UNSPECIFIED SEVERE PROTEIN-CALORIE MALNUTRITION: ICD-10-CM

## 2021-11-10 DIAGNOSIS — K21.9 GASTRO-ESOPHAGEAL REFLUX DISEASE WITHOUT ESOPHAGITIS: ICD-10-CM

## 2021-11-10 DIAGNOSIS — I77.89 OTHER SPECIFIED DISORDERS OF ARTERIES AND ARTERIOLES: ICD-10-CM

## 2021-11-10 DIAGNOSIS — Z88.8 ALLERGY STATUS TO OTHER DRUGS, MEDICAMENTS AND BIOLOGICAL SUBSTANCES STATUS: ICD-10-CM

## 2021-11-15 ENCOUNTER — INPATIENT (INPATIENT)
Facility: HOSPITAL | Age: 41
LOS: 8 days | Discharge: LEFT BEFORE DISCHARGE | DRG: 721 | End: 2021-11-24
Attending: INTERNAL MEDICINE | Admitting: FAMILY MEDICINE
Payer: MEDICAID

## 2021-11-15 VITALS
HEIGHT: 65 IN | TEMPERATURE: 99 F | HEART RATE: 106 BPM | DIASTOLIC BLOOD PRESSURE: 89 MMHG | SYSTOLIC BLOOD PRESSURE: 149 MMHG | OXYGEN SATURATION: 98 % | WEIGHT: 134.92 LBS | RESPIRATION RATE: 18 BRPM

## 2021-11-15 DIAGNOSIS — K80.20 CALCULUS OF GALLBLADDER WITHOUT CHOLECYSTITIS WITHOUT OBSTRUCTION: Chronic | ICD-10-CM

## 2021-11-15 DIAGNOSIS — R50.9 FEVER, UNSPECIFIED: ICD-10-CM

## 2021-11-15 DIAGNOSIS — Z98.51 TUBAL LIGATION STATUS: Chronic | ICD-10-CM

## 2021-11-15 DIAGNOSIS — Z87.19 PERSONAL HISTORY OF OTHER DISEASES OF THE DIGESTIVE SYSTEM: Chronic | ICD-10-CM

## 2021-11-15 LAB
ALBUMIN SERPL ELPH-MCNC: 2.8 G/DL — LOW (ref 3.3–5)
ALP SERPL-CCNC: 82 U/L — SIGNIFICANT CHANGE UP (ref 40–120)
ALT FLD-CCNC: 15 U/L — SIGNIFICANT CHANGE UP (ref 12–78)
ANION GAP SERPL CALC-SCNC: 9 MMOL/L — SIGNIFICANT CHANGE UP (ref 5–17)
APTT BLD: 29.1 SEC — SIGNIFICANT CHANGE UP (ref 27.5–35.5)
AST SERPL-CCNC: 15 U/L — SIGNIFICANT CHANGE UP (ref 15–37)
BASOPHILS # BLD AUTO: 0.02 K/UL — SIGNIFICANT CHANGE UP (ref 0–0.2)
BASOPHILS NFR BLD AUTO: 0.4 % — SIGNIFICANT CHANGE UP (ref 0–2)
BILIRUB SERPL-MCNC: 0.3 MG/DL — SIGNIFICANT CHANGE UP (ref 0.2–1.2)
BUN SERPL-MCNC: 67 MG/DL — HIGH (ref 7–23)
CALCIUM SERPL-MCNC: 8.7 MG/DL — SIGNIFICANT CHANGE UP (ref 8.5–10.1)
CHLORIDE SERPL-SCNC: 97 MMOL/L — SIGNIFICANT CHANGE UP (ref 96–108)
CO2 SERPL-SCNC: 29 MMOL/L — SIGNIFICANT CHANGE UP (ref 22–31)
CREAT SERPL-MCNC: 5.92 MG/DL — HIGH (ref 0.5–1.3)
EOSINOPHIL # BLD AUTO: 0.04 K/UL — SIGNIFICANT CHANGE UP (ref 0–0.5)
EOSINOPHIL NFR BLD AUTO: 0.8 % — SIGNIFICANT CHANGE UP (ref 0–6)
FLUAV AG NPH QL: SIGNIFICANT CHANGE UP
FLUBV AG NPH QL: SIGNIFICANT CHANGE UP
GLUCOSE SERPL-MCNC: 88 MG/DL — SIGNIFICANT CHANGE UP (ref 70–99)
HCT VFR BLD CALC: 29.8 % — LOW (ref 34.5–45)
HGB BLD-MCNC: 9.2 G/DL — LOW (ref 11.5–15.5)
IMM GRANULOCYTES NFR BLD AUTO: 0.4 % — SIGNIFICANT CHANGE UP (ref 0–1.5)
INR BLD: 1.14 RATIO — SIGNIFICANT CHANGE UP (ref 0.88–1.16)
LACTATE SERPL-SCNC: 1.8 MMOL/L — SIGNIFICANT CHANGE UP (ref 0.7–2)
LYMPHOCYTES # BLD AUTO: 0.39 K/UL — LOW (ref 1–3.3)
LYMPHOCYTES # BLD AUTO: 8.1 % — LOW (ref 13–44)
MCHC RBC-ENTMCNC: 28.5 PG — SIGNIFICANT CHANGE UP (ref 27–34)
MCHC RBC-ENTMCNC: 30.9 GM/DL — LOW (ref 32–36)
MCV RBC AUTO: 92.3 FL — SIGNIFICANT CHANGE UP (ref 80–100)
MONOCYTES # BLD AUTO: 0.21 K/UL — SIGNIFICANT CHANGE UP (ref 0–0.9)
MONOCYTES NFR BLD AUTO: 4.3 % — SIGNIFICANT CHANGE UP (ref 2–14)
NEUTROPHILS # BLD AUTO: 4.15 K/UL — SIGNIFICANT CHANGE UP (ref 1.8–7.4)
NEUTROPHILS NFR BLD AUTO: 86 % — HIGH (ref 43–77)
PLATELET # BLD AUTO: 140 K/UL — LOW (ref 150–400)
POTASSIUM SERPL-MCNC: 4.2 MMOL/L — SIGNIFICANT CHANGE UP (ref 3.5–5.3)
POTASSIUM SERPL-SCNC: 4.2 MMOL/L — SIGNIFICANT CHANGE UP (ref 3.5–5.3)
PROT SERPL-MCNC: 7 GM/DL — SIGNIFICANT CHANGE UP (ref 6–8.3)
PROTHROM AB SERPL-ACNC: 13.1 SEC — SIGNIFICANT CHANGE UP (ref 10.6–13.6)
RBC # BLD: 3.23 M/UL — LOW (ref 3.8–5.2)
RBC # FLD: 19.9 % — HIGH (ref 10.3–14.5)
RSV RNA NPH QL NAA+NON-PROBE: SIGNIFICANT CHANGE UP
SARS-COV-2 RNA SPEC QL NAA+PROBE: SIGNIFICANT CHANGE UP
SODIUM SERPL-SCNC: 135 MMOL/L — SIGNIFICANT CHANGE UP (ref 135–145)
WBC # BLD: 4.83 K/UL — SIGNIFICANT CHANGE UP (ref 3.8–10.5)
WBC # FLD AUTO: 4.83 K/UL — SIGNIFICANT CHANGE UP (ref 3.8–10.5)

## 2021-11-15 PROCEDURE — 86901 BLOOD TYPING SEROLOGIC RH(D): CPT

## 2021-11-15 PROCEDURE — 87040 BLOOD CULTURE FOR BACTERIA: CPT

## 2021-11-15 PROCEDURE — 80048 BASIC METABOLIC PNL TOTAL CA: CPT

## 2021-11-15 PROCEDURE — 86140 C-REACTIVE PROTEIN: CPT

## 2021-11-15 PROCEDURE — 99261: CPT

## 2021-11-15 PROCEDURE — 86850 RBC ANTIBODY SCREEN: CPT

## 2021-11-15 PROCEDURE — 93306 TTE W/DOPPLER COMPLETE: CPT

## 2021-11-15 PROCEDURE — 86923 COMPATIBILITY TEST ELECTRIC: CPT

## 2021-11-15 PROCEDURE — 36430 TRANSFUSION BLD/BLD COMPNT: CPT

## 2021-11-15 PROCEDURE — P9016: CPT

## 2021-11-15 PROCEDURE — 83735 ASSAY OF MAGNESIUM: CPT

## 2021-11-15 PROCEDURE — 36415 COLL VENOUS BLD VENIPUNCTURE: CPT

## 2021-11-15 PROCEDURE — 99223 1ST HOSP IP/OBS HIGH 75: CPT

## 2021-11-15 PROCEDURE — 80307 DRUG TEST PRSMV CHEM ANLYZR: CPT

## 2021-11-15 PROCEDURE — 86900 BLOOD TYPING SEROLOGIC ABO: CPT

## 2021-11-15 PROCEDURE — C1894: CPT

## 2021-11-15 PROCEDURE — 97116 GAIT TRAINING THERAPY: CPT | Mod: GP

## 2021-11-15 PROCEDURE — 36556 INSERT NON-TUNNEL CV CATH: CPT

## 2021-11-15 PROCEDURE — 76937 US GUIDE VASCULAR ACCESS: CPT

## 2021-11-15 PROCEDURE — 84100 ASSAY OF PHOSPHORUS: CPT

## 2021-11-15 PROCEDURE — 85025 COMPLETE CBC W/AUTO DIFF WBC: CPT

## 2021-11-15 PROCEDURE — 80069 RENAL FUNCTION PANEL: CPT

## 2021-11-15 PROCEDURE — 99285 EMERGENCY DEPT VISIT HI MDM: CPT

## 2021-11-15 PROCEDURE — 85027 COMPLETE CBC AUTOMATED: CPT

## 2021-11-15 PROCEDURE — 86769 SARS-COV-2 COVID-19 ANTIBODY: CPT

## 2021-11-15 PROCEDURE — U0003: CPT

## 2021-11-15 PROCEDURE — 99406 BEHAV CHNG SMOKING 3-10 MIN: CPT

## 2021-11-15 PROCEDURE — 97162 PT EVAL MOD COMPLEX 30 MIN: CPT | Mod: GP

## 2021-11-15 PROCEDURE — C1769: CPT

## 2021-11-15 PROCEDURE — 71045 X-RAY EXAM CHEST 1 VIEW: CPT | Mod: 26

## 2021-11-15 PROCEDURE — U0005: CPT

## 2021-11-15 PROCEDURE — C1752: CPT

## 2021-11-15 PROCEDURE — 97530 THERAPEUTIC ACTIVITIES: CPT | Mod: GP

## 2021-11-15 PROCEDURE — 77001 FLUOROGUIDE FOR VEIN DEVICE: CPT

## 2021-11-15 PROCEDURE — 84145 PROCALCITONIN (PCT): CPT

## 2021-11-15 PROCEDURE — 93010 ELECTROCARDIOGRAM REPORT: CPT

## 2021-11-15 RX ORDER — GABAPENTIN 400 MG/1
100 CAPSULE ORAL THREE TIMES A DAY
Refills: 0 | Status: DISCONTINUED | OUTPATIENT
Start: 2021-11-15 | End: 2021-11-24

## 2021-11-15 RX ORDER — ERYTHROPOIETIN 10000 [IU]/ML
10000 INJECTION, SOLUTION INTRAVENOUS; SUBCUTANEOUS
Refills: 0 | Status: DISCONTINUED | OUTPATIENT
Start: 2021-11-15 | End: 2021-11-24

## 2021-11-15 RX ORDER — ATOVAQUONE 750 MG/5ML
1500 SUSPENSION ORAL DAILY
Refills: 0 | Status: DISCONTINUED | OUTPATIENT
Start: 2021-11-15 | End: 2021-11-24

## 2021-11-15 RX ORDER — PIPERACILLIN AND TAZOBACTAM 4; .5 G/20ML; G/20ML
3.38 INJECTION, POWDER, LYOPHILIZED, FOR SOLUTION INTRAVENOUS ONCE
Refills: 0 | Status: COMPLETED | OUTPATIENT
Start: 2021-11-15 | End: 2021-11-15

## 2021-11-15 RX ORDER — PIPERACILLIN AND TAZOBACTAM 4; .5 G/20ML; G/20ML
3.38 INJECTION, POWDER, LYOPHILIZED, FOR SOLUTION INTRAVENOUS EVERY 12 HOURS
Refills: 0 | Status: DISCONTINUED | OUTPATIENT
Start: 2021-11-15 | End: 2021-11-16

## 2021-11-15 RX ORDER — LANOLIN ALCOHOL/MO/W.PET/CERES
3 CREAM (GRAM) TOPICAL AT BEDTIME
Refills: 0 | Status: DISCONTINUED | OUTPATIENT
Start: 2021-11-15 | End: 2021-11-15

## 2021-11-15 RX ORDER — LOPERAMIDE HCL 2 MG
2 TABLET ORAL THREE TIMES A DAY
Refills: 0 | Status: DISCONTINUED | OUTPATIENT
Start: 2021-11-15 | End: 2021-11-24

## 2021-11-15 RX ORDER — ONDANSETRON 8 MG/1
4 TABLET, FILM COATED ORAL EVERY 8 HOURS
Refills: 0 | Status: DISCONTINUED | OUTPATIENT
Start: 2021-11-15 | End: 2021-11-24

## 2021-11-15 RX ORDER — HEPARIN SODIUM 5000 [USP'U]/ML
5000 INJECTION INTRAVENOUS; SUBCUTANEOUS EVERY 8 HOURS
Refills: 0 | Status: DISCONTINUED | OUTPATIENT
Start: 2021-11-15 | End: 2021-11-24

## 2021-11-15 RX ORDER — LANOLIN ALCOHOL/MO/W.PET/CERES
3 CREAM (GRAM) TOPICAL AT BEDTIME
Refills: 0 | Status: DISCONTINUED | OUTPATIENT
Start: 2021-11-15 | End: 2021-11-24

## 2021-11-15 RX ORDER — VANCOMYCIN HCL 1 G
1000 VIAL (EA) INTRAVENOUS ONCE
Refills: 0 | Status: COMPLETED | OUTPATIENT
Start: 2021-11-15 | End: 2021-11-15

## 2021-11-15 RX ORDER — ACETAMINOPHEN 500 MG
975 TABLET ORAL ONCE
Refills: 0 | Status: COMPLETED | OUTPATIENT
Start: 2021-11-15 | End: 2021-11-15

## 2021-11-15 RX ORDER — OXYCODONE AND ACETAMINOPHEN 5; 325 MG/1; MG/1
2 TABLET ORAL EVERY 6 HOURS
Refills: 0 | Status: DISCONTINUED | OUTPATIENT
Start: 2021-11-15 | End: 2021-11-20

## 2021-11-15 RX ORDER — PANTOPRAZOLE SODIUM 20 MG/1
40 TABLET, DELAYED RELEASE ORAL
Refills: 0 | Status: DISCONTINUED | OUTPATIENT
Start: 2021-11-15 | End: 2021-11-24

## 2021-11-15 RX ORDER — SENNA PLUS 8.6 MG/1
2 TABLET ORAL AT BEDTIME
Refills: 0 | Status: DISCONTINUED | OUTPATIENT
Start: 2021-11-15 | End: 2021-11-24

## 2021-11-15 RX ORDER — ACETAMINOPHEN 500 MG
650 TABLET ORAL EVERY 6 HOURS
Refills: 0 | Status: DISCONTINUED | OUTPATIENT
Start: 2021-11-15 | End: 2021-11-24

## 2021-11-15 RX ORDER — AMLODIPINE BESYLATE 2.5 MG/1
10 TABLET ORAL DAILY
Refills: 0 | Status: DISCONTINUED | OUTPATIENT
Start: 2021-11-15 | End: 2021-11-24

## 2021-11-15 RX ORDER — HYDROXYCHLOROQUINE SULFATE 200 MG
200 TABLET ORAL DAILY
Refills: 0 | Status: DISCONTINUED | OUTPATIENT
Start: 2021-11-15 | End: 2021-11-24

## 2021-11-15 RX ORDER — CALCIUM ACETATE 667 MG
1334 TABLET ORAL
Refills: 0 | Status: DISCONTINUED | OUTPATIENT
Start: 2021-11-15 | End: 2021-11-23

## 2021-11-15 RX ADMIN — PIPERACILLIN AND TAZOBACTAM 3.38 GRAM(S): 4; .5 INJECTION, POWDER, LYOPHILIZED, FOR SOLUTION INTRAVENOUS at 15:25

## 2021-11-15 RX ADMIN — HEPARIN SODIUM 5000 UNIT(S): 5000 INJECTION INTRAVENOUS; SUBCUTANEOUS at 22:19

## 2021-11-15 RX ADMIN — Medication 650 MILLIGRAM(S): at 18:39

## 2021-11-15 RX ADMIN — PIPERACILLIN AND TAZOBACTAM 200 GRAM(S): 4; .5 INJECTION, POWDER, LYOPHILIZED, FOR SOLUTION INTRAVENOUS at 14:55

## 2021-11-15 RX ADMIN — Medication 250 MILLIGRAM(S): at 22:20

## 2021-11-15 RX ADMIN — Medication 3 MILLIGRAM(S): at 22:18

## 2021-11-15 RX ADMIN — GABAPENTIN 100 MILLIGRAM(S): 400 CAPSULE ORAL at 22:18

## 2021-11-15 RX ADMIN — Medication 975 MILLIGRAM(S): at 14:55

## 2021-11-15 RX ADMIN — Medication 650 MILLIGRAM(S): at 19:02

## 2021-11-15 RX ADMIN — ERYTHROPOIETIN 10000 UNIT(S): 10000 INJECTION, SOLUTION INTRAVENOUS; SUBCUTANEOUS at 19:12

## 2021-11-15 RX ADMIN — OXYCODONE AND ACETAMINOPHEN 2 TABLET(S): 5; 325 TABLET ORAL at 22:17

## 2021-11-15 NOTE — ED PROVIDER NOTE - OBJECTIVE STATEMENT
41 yof, Hx: RA, Raynauds, SLE (chronically immunosuppressed), ESRD (initiated on HD 5 weeks ago; Right non-Tunneled HD Catheter placed 10/22/21). Presents with 3 days of high fevers (Tmax 103F). Denies any sore throat, cough, CP, SOB, abdominal pain, nausea, vomiting, diarrhea, constipation, bloody stools, dysuric symptoms. Reports last HD on Friday - due today. Denies any headache, rashes, neck pain. 41 yof, Hx: RA, Raynauds, SLE (chronically immunosuppressed), ESRD (initiated on HD 5 weeks ago; Right Tunneled HD Catheter placed 10/22/21). Presents with 3 days of high fevers (Tmax 103F). Denies any sore throat, cough, CP, SOB, abdominal pain, nausea, vomiting, diarrhea, constipation, bloody stools, dysuric symptoms. Reports last HD on Friday - due today. Denies any headache, rashes, neck pain.

## 2021-11-15 NOTE — ED PROVIDER NOTE - ATTENDING CONTRIBUTION TO CARE
Attending Attestation:  Braden Durham MD,  I have personally performed a history and physical examination of the patient and discussed management with the resident as well as the patient.  I reviewed the resident's note and agree with the documented findings and plan of care.  I have authored and modified critical sections of the Provider Note, including but not limited to HPI, ROS, Physical Exam and MDM.

## 2021-11-15 NOTE — ED ADULT TRIAGE NOTE - CHIEF COMPLAINT QUOTE
patient states she had fever 102, two days ago, took tylenol with positive result, c/o  fever last night, took tylenol, c/o HTN bp at home 187/127, c/o associated chest pressure Hx: RA, ESRD on HD, last dialysis on friday, went for dialysis today, was sent to ER for fever, c/o right sided chest pain at dialysis catheter site.

## 2021-11-15 NOTE — ED PROVIDER NOTE - NS ED ROS FT
Constitution: (+) Fever or chills, No Weight Loss,   Eyes: No visual changes  HEENT: No cough, No Discharge, No Rhinorrhea, No URI symptoms  Cardio: No Chest pain, No Palpitations, No Dyspnea  Resp: No SOB, No Wheezing  GI: No abdominal pain, No Nausea, No Vomiting, No Constipation, No Diarrhea  : No burning upon urination, trouble urinating, no foul odor from urine  MSK: No Back pain, No Numbness, No Tingling, No Weakness  Neuro: No Headache, Normal Gait  Skin: No rashes, No Bruising, No Swelling

## 2021-11-15 NOTE — H&P ADULT - NSHPPHYSICALEXAM_GEN_ALL_CORE
ICU Vital Signs Last 24 Hrs  T(C): 37.4 (15 Nov 2021 19:56), Max: 37.7 (15 Nov 2021 16:00)  T(F): 99.4 (15 Nov 2021 19:56), Max: 99.8 (15 Nov 2021 16:00)  HR: 104 (15 Nov 2021 19:59) (102 - 109)  BP: 132/85 (15 Nov 2021 19:59) (125/77 - 149/89)  BP(mean): 107 (15 Nov 2021 13:59) (107 - 107)  RR: 15 (15 Nov 2021 19:59) (15 - 18)  SpO2: 98% (15 Nov 2021 13:59) (98% - 98%)    General: Awake and alert, cooperative with exam. No acute distress.   Skin: Warm, dry, and pink. Pale complexion.   Eyes: Pupils equal and reactive to light. Extraocular eye movements intact. No conjunctival injection, discharge, or scleral icterus.   HEENT: Atraumatic, normocephalic. Moist mucus membranes.   Chest wall: Tunneled HD catheter without surrounding erythema or drainage.   Cardiology: Normal S1, S2. Systolic ejection murmur. Regular rate and rhythm.   Respiratory: Decreased breath sounds at the bases bilaterally. No wheezes, rales, or rhonchi. Normal chest expansion.   Gastrointestinal: Positive bowel sounds. Soft, non-tender, non-distended. No guarding, rigidity, or rebound tenderness. No hepatosplenomegaly.   Musculoskeletal: Contracted, decreased ROM of upper extremities.   Extremities: 2+ pitting edema bilaterally. Dorsalis pedis pulses 2+ bilaterally. Wound on left lower extremity approximately 2 x 2 cm without pus draining.   Neurological: A+Ox3 (person, place, and time). Cranial nerves 2-12 intact. Normal speech. No facial droop. No focal neurological deficits. 5/5 motor strength in all extremities.   Psychiatric: Normal affect. Normal mood. ICU Vital Signs Last 24 Hrs  T(C): 37.4 (15 Nov 2021 19:56), Max: 37.7 (15 Nov 2021 16:00)  T(F): 99.4 (15 Nov 2021 19:56), Max: 99.8 (15 Nov 2021 16:00)  HR: 104 (15 Nov 2021 19:59) (102 - 109)  BP: 132/85 (15 Nov 2021 19:59) (125/77 - 149/89)  BP(mean): 107 (15 Nov 2021 13:59) (107 - 107)  RR: 15 (15 Nov 2021 19:59) (15 - 18)  SpO2: 98% (15 Nov 2021 13:59) (98% - 98%)    General: Awake and alert, cooperative with exam. No acute distress.   Skin: Warm, dry, and pink. Pale complexion.   Eyes: Pupils equal and reactive to light. Extraocular eye movements intact. No conjunctival injection, discharge, or scleral icterus.   HEENT: Atraumatic, normocephalic. Moist mucus membranes.   Chest wall: Tunneled HD catheter without surrounding erythema or drainage.   Cardiology: Normal S1, S2. Systolic ejection murmur. Regular rate and rhythm.   Respiratory: Decreased breath sounds at the bases bilaterally. No wheezes, rales, or rhonchi. Normal chest expansion.   Gastrointestinal: Positive bowel sounds. Soft, non-tender, non-distended. No guarding, rigidity, or rebound tenderness. No hepatosplenomegaly.   Buttocks: Sacral decubitus ulcer with eschar, no surrounding erythema or purulent drainage.   Musculoskeletal: Contracted, decreased ROM of upper extremities.   Extremities: 2+ pitting edema bilaterally. Dorsalis pedis pulses 2+ bilaterally. Wound on left lower extremity approximately 2 x 2 cm with purulent drainage, no surrounding erythema.   Neurological: A+Ox3 (person, place, and time). Cranial nerves 2-12 intact. Normal speech. No facial droop. No focal neurological deficits. 5/5 motor strength in all extremities.   Psychiatric: Normal affect. Normal mood.

## 2021-11-15 NOTE — H&P ADULT - NSHPREVIEWOFSYSTEMS_GEN_ALL_CORE
Constitutional: positive for fatigue, positive for fever, negative for chills, negative for decreased appetite.  Skin: negative for rashes, positive for open wounds lower extremities, negative for jaundice.   Eyes: negative for blurry vision, negative for double vision.   Ears, nose, throat: negative for ear pain, negative for nasal congestion, negative for sore throat, negative for lymph node swelling.   Cardiovascular: negative for chest pain, negative for palpitations, negative for lower extremity swelling.   Respiratory: negative for shortness of breath, negative for wheezing, positive for cough.   Gastrointestinal: negative for abdominal pain, negative for nausea, negative for vomiting, negative for diarrhea, negative for constipation, negative for blood in the stool, negative for black tarry stools.   Genitourinary: negative for burning on urination, negative for urinary urgency or frequency, negative for blood in the urine.   Endocrine: negative for cold intolerance, negative for heat intolerance, negative for increased thirst.   Hematologic: negative for easy bruising or bleeding.   Musculoskeletal: positive for muscle/joint pain, positive for decreased range of motion.   Neurological: negative for dizziness, negative for headaches, negative for loss of consciousness, negative for motor weakness, negative for sensory deficits.   Psychiatric: negative for depression, negative for anxiety.

## 2021-11-15 NOTE — ED STATDOCS - PROGRESS NOTE DETAILS
Ana KIMBROUGH for ED attending Dr. Manjarrez: 40 y/o female with PMHx of RA, ESRD on HD, last dialysis on Friday, recent acute renal failure presents to ED for fevers for past 2 days. Tmax 102F. Pt took tylenol with relief. Also notes elevated blood pressure at home, states it was 187/127. Pt also c/o coughing, chest pressure. Allergies: morphine. Pt with lupus, immunosuppressed, ESRD on HD with fever, will send to main for further evaluation.

## 2021-11-15 NOTE — H&P ADULT - NSHPSOCIALHISTORY_GEN_ALL_CORE
Lives with her roommate. Smokes 2 cigarettes per day, has thought about quitting previously. Does not want Nicotine patch. Denies alcohol use. Smokes marijuana. Had a past history of Heroin use which she states she stopped 1 year ago.

## 2021-11-15 NOTE — H&P ADULT - HISTORY OF PRESENT ILLNESS
42 y/o F with PMH [place], bacteremia, COPD, depression, epilepsy, GERD, Raynaud's syndrome, heroin abuse, HTN, lupus, RA, sepsis, SLE, smoker, ESRD (initiated on HD 5 weeks ago with tunneled HD catheter placed 10/22/21) presents for fevers over the last 3 days. Her highest temperature was 103.4F at home. She was taking Tylenol over the counter. She feels that her RA has been flaring up because she was been having difficulty moving her arms and legs. Reports headache, cough which is dry with minimal sputum production, soreness at the site of her Tunneled HD catheter, SOB in the mornings since starting HD 5 weeks ago, lower extremity swelling. Denies congestion, runny nose, sore throat, chest pain, palpitations, abdominal pain, N/V, diarrhea/constipation, burning on urination, urinary urgency/frequency. Of note, pt had a prior urine culture 10/11/2021 showed >100,000 of Enterobacter cloacae and a wound culture from 7/13/2020 which showed MRSA. She was supposed to go to HD today and called Dr. Pena's office who instructed her to come to the ER. Of note, pt has a history of bacteremia and had a tracheostomy tube placed and PEG.     ER course: -109. Labs: Hb 9.2, , BUN 67, Cr 5.92, Albumin 2.8, GFR 8. UA: total protein 510, protein/Cr ratio 10.6, protein 500, large blood, RBC 25-50, moderate bacteria, glucose 100. COVID negative. RSV negative. Influenza A and B negative.     EKG:     Imaging:   - CXR: Tunneled HD catheter present, no consolidation, no effusion, no pneumothorax (personally reviewed). Official read: Interstitial prominence; mild interstitial edema cannot be excluded.    Pt was given Zosyn and Vancomycin, Tylenol Epogen. She is being admitted to med/surg for further management.

## 2021-11-15 NOTE — ED PROVIDER NOTE - PROGRESS NOTE DETAILS
josephine: Discussed need to admit with patient & discussed risk and benefits.  Patient agreed to admission.  Discussed case w/ admitting doctor - agreed to admit to their service. will place bridge orders. Accepting physician said: APPROVE PT TO MOVE    prior to inpatient team evaluation

## 2021-11-15 NOTE — ED ADULT NURSE REASSESSMENT NOTE - NS ED NURSE REASSESS COMMENT FT1
Pt transported to Dialysis. Repo Pt transported to Dialysis. Report given to [facility] on Dialysis.

## 2021-11-15 NOTE — CONSULT NOTE ADULT - ASSESSMENT
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 We will follow this patient with you.       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 We will follow this patient with you.

## 2021-11-15 NOTE — ED ADULT NURSE REASSESSMENT NOTE - NS ED NURSE REASSESS COMMENT FT1
Vanco held until dialysis completed as per Dr. Pena (Urologist) at bedside. zosyn infusing at this time. Will cont to monitor for safety.

## 2021-11-15 NOTE — ED PROVIDER NOTE - PHYSICAL EXAMINATION
GEN - (+) Chronically Ill Appearing; NAD; non-toxic; A+Ox3, speaking full sentences, steady gait   HENT - NC/AT, No visible Ecchymosis, No Abrasions, No Lac/Tears, MMM, no discharge  EYES - EOMI, no conjunctival pallor, no scleral icterus  NECK - Neck supple, No LAD, No Swelling  PULM - Coarse B/L,  symmetric breath sounds  CV -  Tachy, S1 S2, no murmurs 2+ Pulses B/L UE  GI - (-) Valenzuela's, (-) Rovsings, (-) McBurneys; NT/ND, soft, no guarding, no rebound, no masses    MSK/EXT- no CVA tenderness; (+) 3+ Pitting Symmetric edema, no gross deformity, warm and well perfused, no calf tenderness/swelling/erythema   SKIN - no rash or bruising; Right Chest Wall HD Catheter Site CDI without surrounding erythema/crepitus/TTP  NEUROLOGIC - alert, moving all 4 ext with 5/5 Strength

## 2021-11-15 NOTE — ED ADULT NURSE NOTE - OBJECTIVE STATEMENT
40 y/o female awake alert and oriented x4 presents to ED c/o fever x 3 days. Tmax 103.4 at home. Denies sore throat, cough, chest pain,sob, abd pain, n/v/d/ or urinary sx. Took tylenol 6am this morning. hx RA, raynauds, SLE, dialysis MWF- last dialysis friday. pt was scheduled to have dialysis today but couldn't go in due to fever. Denies sick contact or recent travel. Pt had dialysis catheter placed 3 weeks ago at [facility]. +3 pitting edema noted b/l LE.

## 2021-11-15 NOTE — ED PROVIDER NOTE - CLINICAL SUMMARY MEDICAL DECISION MAKING FREE TEXT BOX
41 yof, Hx: RA, Raynauds, SLE (chronically immunosuppressed), ESRD (initiated on HD 5 weeks ago; Right non-Tunneled HD Catheter placed 10/22/21). Presents with 3 days of high fevers (Tmax 103F). Exam, presentation, and history concerning for sepsis - MRSA and ESBL in past, chronically immunosuppressed. Plan: CBC, CMP, EKG, CXR, UA, UCx, BCx, Tylenol, No Fluids (appears overloaded), Vanc/Kelsi. TBA. Eval is NOT consistent with meningitis/encephalitis (neck supple, non-toxic, no rashes, no photophobia). 41 yof, Hx: RA, Raynauds, SLE (chronically immunosuppressed), ESRD (initiated on HD 5 weeks ago; Right Tunneled HD Catheter placed 10/22/21). Presents with 3 days of high fevers (Tmax 103F). Exam, presentation, and history concerning for sepsis - MRSA and ESBL in past, chronically immunosuppressed. Plan: CBC, CMP, EKG, CXR, UA, UCx, BCx, Tylenol, No Fluids (appears overloaded), Vanc/Kelsi. TBA. Eval is NOT consistent with meningitis/encephalitis (neck supple, non-toxic, no rashes, no photophobia).

## 2021-11-16 LAB
CRP SERPL-MCNC: 385 MG/L — HIGH
GRAM STN FLD: SIGNIFICANT CHANGE UP
HAV IGM SER-ACNC: SIGNIFICANT CHANGE UP
HBV CORE IGM SER-ACNC: SIGNIFICANT CHANGE UP
HBV SURFACE AG SER-ACNC: SIGNIFICANT CHANGE UP
HCV AB S/CO SERPL IA: 0.23 S/CO — SIGNIFICANT CHANGE UP (ref 0–0.99)
HCV AB SERPL-IMP: SIGNIFICANT CHANGE UP
METHOD TYPE: SIGNIFICANT CHANGE UP
MRSA SPEC QL CULT: SIGNIFICANT CHANGE UP
PROCALCITONIN SERPL-MCNC: 36.1 NG/ML — HIGH (ref 0.02–0.1)

## 2021-11-16 PROCEDURE — 93306 TTE W/DOPPLER COMPLETE: CPT | Mod: 26

## 2021-11-16 PROCEDURE — 99233 SBSQ HOSP IP/OBS HIGH 50: CPT

## 2021-11-16 RX ORDER — DAPTOMYCIN 500 MG/10ML
450 INJECTION, POWDER, LYOPHILIZED, FOR SOLUTION INTRAVENOUS
Refills: 0 | Status: DISCONTINUED | OUTPATIENT
Start: 2021-11-16 | End: 2021-11-24

## 2021-11-16 RX ORDER — INFLUENZA VIRUS VACCINE 15; 15; 15; 15 UG/.5ML; UG/.5ML; UG/.5ML; UG/.5ML
0.5 SUSPENSION INTRAMUSCULAR ONCE
Refills: 0 | Status: DISCONTINUED | OUTPATIENT
Start: 2021-11-16 | End: 2021-11-24

## 2021-11-16 RX ADMIN — Medication 50 MILLIGRAM(S): at 09:54

## 2021-11-16 RX ADMIN — GABAPENTIN 100 MILLIGRAM(S): 400 CAPSULE ORAL at 05:38

## 2021-11-16 RX ADMIN — ATOVAQUONE 1500 MILLIGRAM(S): 750 SUSPENSION ORAL at 14:07

## 2021-11-16 RX ADMIN — Medication 1 DROP(S): at 12:02

## 2021-11-16 RX ADMIN — SENNA PLUS 2 TABLET(S): 8.6 TABLET ORAL at 21:45

## 2021-11-16 RX ADMIN — OXYCODONE AND ACETAMINOPHEN 2 TABLET(S): 5; 325 TABLET ORAL at 11:30

## 2021-11-16 RX ADMIN — OXYCODONE AND ACETAMINOPHEN 2 TABLET(S): 5; 325 TABLET ORAL at 17:39

## 2021-11-16 RX ADMIN — Medication 1 DROP(S): at 00:23

## 2021-11-16 RX ADMIN — HEPARIN SODIUM 5000 UNIT(S): 5000 INJECTION INTRAVENOUS; SUBCUTANEOUS at 21:44

## 2021-11-16 RX ADMIN — Medication 3 MILLIGRAM(S): at 21:47

## 2021-11-16 RX ADMIN — OXYCODONE AND ACETAMINOPHEN 2 TABLET(S): 5; 325 TABLET ORAL at 16:55

## 2021-11-16 RX ADMIN — Medication 1334 MILLIGRAM(S): at 18:39

## 2021-11-16 RX ADMIN — PANTOPRAZOLE SODIUM 40 MILLIGRAM(S): 20 TABLET, DELAYED RELEASE ORAL at 05:38

## 2021-11-16 RX ADMIN — Medication 650 MILLIGRAM(S): at 02:21

## 2021-11-16 RX ADMIN — Medication 200 MILLIGRAM(S): at 12:02

## 2021-11-16 RX ADMIN — GABAPENTIN 100 MILLIGRAM(S): 400 CAPSULE ORAL at 21:47

## 2021-11-16 RX ADMIN — OXYCODONE AND ACETAMINOPHEN 2 TABLET(S): 5; 325 TABLET ORAL at 10:54

## 2021-11-16 RX ADMIN — Medication 0.1 MILLIGRAM(S): at 09:54

## 2021-11-16 RX ADMIN — Medication 1 DROP(S): at 21:44

## 2021-11-16 RX ADMIN — DAPTOMYCIN 118 MILLIGRAM(S): 500 INJECTION, POWDER, LYOPHILIZED, FOR SOLUTION INTRAVENOUS at 15:56

## 2021-11-16 RX ADMIN — AMLODIPINE BESYLATE 10 MILLIGRAM(S): 2.5 TABLET ORAL at 09:54

## 2021-11-16 RX ADMIN — PIPERACILLIN AND TAZOBACTAM 25 GRAM(S): 4; .5 INJECTION, POWDER, LYOPHILIZED, FOR SOLUTION INTRAVENOUS at 02:20

## 2021-11-16 RX ADMIN — OXYCODONE AND ACETAMINOPHEN 2 TABLET(S): 5; 325 TABLET ORAL at 04:39

## 2021-11-16 RX ADMIN — GABAPENTIN 100 MILLIGRAM(S): 400 CAPSULE ORAL at 14:06

## 2021-11-16 RX ADMIN — OXYCODONE AND ACETAMINOPHEN 2 TABLET(S): 5; 325 TABLET ORAL at 23:27

## 2021-11-16 NOTE — PROGRESS NOTE ADULT - ASSESSMENT
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    NATALIA on CKD with HD dependence  -HD 11/15, bacteremia. Catheter removal today (d/c with Dr Harrington and Milli PA)  -Will plan for temp cath based on labs/volume status, marcy catheter may require some delay  -AVG as a consideration if concern regarding patient manipulation of catheter?  -Maintain phos binders, home meds  -Abx per ID    HTN  -Oral meds    Vasculitis  -Still on prednisone  -Known to rheum    thanks, will follow  d/c with RN staff, HD staff, OLIVIA Charlton and IR team, Dr Harrington

## 2021-11-16 NOTE — CONSULT NOTE ADULT - ASSESSMENT
42 y/o F with past medical history COPD, depression, epilepsy, GERD, Raynaud's syndrome, heroin abuse, HTN, lupus, RA, sepsis, SLE, smoker, ESRD (initiated on HD 5 weeks ago with tunneled HD catheter placed 10/22/21) presents on 11/15 for fevers over the last 3 days. Her highest temperature was 103.4F at home. She was taking Tylenol over the counter. She feels that her RA has been flaring up because she was been having difficulty moving her arms and legs. Reports headache, cough which is dry with minimal sputum production, soreness at the site of her Tunneled HD catheter, SOB in the mornings since starting HD 5 weeks ago, lower extremity swelling. Denies congestion, runny nose, sore throat, chest pain, palpitations, abdominal pain, N/V, diarrhea/constipation, burning on urination, urinary urgency/frequency. Admission blood cultures 4/4 bottles are growing MRSA and patient has fullness and pain adjacent to dialysis catheter site. Poor historian, history per medical record.     1. Patient admitted with MRSA sepsis probably due to line infection versus IVDU versus endocarditis; also history of ESRD on hemodialysis  - follow up cultures   - serial cbc and monitor temperature   - reviewed prior medical records to evaluate for resistant or atypical pathogens   - nephrology evaluation  - catheter needs to be removed  - will optimize antibiotics to daptomycin q 48 hours  - will check echocardiogram  - watch for withdrawal, consideration for behavioral health evaluation  - continue isolation  2. other issues: per medicine

## 2021-11-16 NOTE — DIETITIAN INITIAL EVALUATION ADULT. - PERTINENT LABORATORY DATA
11-15    135  |  97  |  67<H>  ----------------------------<  88  4.2   |  29  |  5.92<H>    Ca    8.7      15 Nov 2021 14:48    TPro  7.0  /  Alb  2.8<L>  /  TBili  0.3  /  DBili  x   /  AST  15  /  ALT  15  /  AlkPhos  82  11-15

## 2021-11-16 NOTE — PHYSICAL THERAPY INITIAL EVALUATION ADULT - GENERAL OBSERVATIONS, REHAB EVAL
Pt was found lying in bed with o2 on, premedicated for pain, appears very weak, has wounds+ BLE, pt is willing to participate in PT on much motivation

## 2021-11-16 NOTE — PHYSICAL THERAPY INITIAL EVALUATION ADULT - DIAGNOSIS, PT EVAL
Sepsis could be secondary to bacteremia vs. UTI vs. sacral decubitus ulcer, ESRD on HD ,Sacral decubitus ulcer, wounds on the left lower extremities, left knee pain(TKR pending)

## 2021-11-16 NOTE — DIETITIAN INITIAL EVALUATION ADULT. - PERTINENT MEDS FT
MEDICATIONS  (STANDING):  amLODIPine   Tablet 10 milliGRAM(s) Oral daily  artificial  tears Solution 1 Drop(s) Both EYES two times a day  atovaquone  Suspension 1500 milliGRAM(s) Oral daily  calcium acetate 1334 milliGRAM(s) Oral three times a day with meals  cloNIDine 0.1 milliGRAM(s) Oral daily  epoetin amee-epbx (RETACRIT) Injectable 93582 Unit(s) IV Push <User Schedule>  gabapentin 100 milliGRAM(s) Oral three times a day  heparin   Injectable 5000 Unit(s) SubCutaneous every 8 hours  hydroxychloroquine 200 milliGRAM(s) Oral daily  influenza   Vaccine 0.5 milliLiter(s) IntraMuscular once  pantoprazole    Tablet 40 milliGRAM(s) Oral before breakfast  piperacillin/tazobactam IVPB.. 3.375 Gram(s) IV Intermittent every 12 hours  predniSONE   Tablet 50 milliGRAM(s) Oral daily  senna 2 Tablet(s) Oral at bedtime    MEDICATIONS  (PRN):  acetaminophen     Tablet .. 650 milliGRAM(s) Oral every 6 hours PRN Mild Pain (1 - 3)  aluminum hydroxide/magnesium hydroxide/simethicone Suspension 30 milliLiter(s) Oral every 4 hours PRN Dyspepsia  loperamide 2 milliGRAM(s) Oral three times a day PRN Diarrhea  melatonin 3 milliGRAM(s) Oral at bedtime PRN Insomnia  ondansetron Injectable 4 milliGRAM(s) IV Push every 8 hours PRN Nausea and/or Vomiting  oxycodone    5 mG/acetaminophen 325 mG 2 Tablet(s) Oral every 6 hours PRN Severe Pain (7 - 10)

## 2021-11-16 NOTE — PHYSICAL THERAPY INITIAL EVALUATION ADULT - LIVES WITH, PROFILE
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

## 2021-11-16 NOTE — PHYSICAL THERAPY INITIAL EVALUATION ADULT - PERTINENT HX OF CURRENT PROBLEM, REHAB EVAL
Pt presents for fevers over the last 3 days. Her highest temperature was 103.4F at home. She feels that her RA has been flaring up because she was been having difficulty moving her arms and legs. Reports headache, cough which is dry with minimal sputum production, soreness at the site of her Tunneled HD catheter, SOB in the mornings since starting HD 5 weeks ago, lower extremity swelling., Of note, pt has a history of bacteremia and had a tracheostomy tube placed and PEG. on HD M/W/F

## 2021-11-16 NOTE — DIETITIAN NUTRITION RISK NOTIFICATION - ADDITIONAL COMMENTS/DIETITIAN RECOMMENDATIONS
Maintain renal diet  Nepro tid add  Suggest add Vit C 500 mg BID, add Zinc Sulfate 220 mg x 10 days to promote wound healing   Check Phos levels  Tray set-up  Monitor PO intake, tolerance, labs and weight.

## 2021-11-16 NOTE — DIETITIAN INITIAL EVALUATION ADULT. - ADD RECOMMEND
Record PO intake in EMR after each meal (nursing.) Nepro tid.  FR 1000 cc.  Daily weights. Suggest add Vit C 500 mg BID, add Zinc Sulfate 220 mg x 10 days to promote wound healing.   Monitor PO intake, tolerance, labs and weight.

## 2021-11-16 NOTE — DIETITIAN NUTRITION RISK NOTIFICATION - TREATMENT: THE FOLLOWING DIET HAS BEEN RECOMMENDED
Diet, Renal Restrictions:   For patients receiving Renal Replacement - No Protein Restr, No Conc K, No Conc Phos, Low Sodium (11-15-21 @ 20:52) [Active]

## 2021-11-16 NOTE — DIETITIAN INITIAL EVALUATION ADULT. - NAME AND PHONE
Dian Chavira RDN, CDN, [facility]      [phone]   [email or web address]

## 2021-11-17 LAB
ANION GAP SERPL CALC-SCNC: 11 MMOL/L — SIGNIFICANT CHANGE UP (ref 5–17)
BUN SERPL-MCNC: 57 MG/DL — HIGH (ref 7–23)
CALCIUM SERPL-MCNC: 8.1 MG/DL — LOW (ref 8.5–10.1)
CHLORIDE SERPL-SCNC: 101 MMOL/L — SIGNIFICANT CHANGE UP (ref 96–108)
CO2 SERPL-SCNC: 25 MMOL/L — SIGNIFICANT CHANGE UP (ref 22–31)
CREAT SERPL-MCNC: 5.54 MG/DL — HIGH (ref 0.5–1.3)
GLUCOSE SERPL-MCNC: 149 MG/DL — HIGH (ref 70–99)
HCT VFR BLD CALC: 24.8 % — LOW (ref 34.5–45)
HGB BLD-MCNC: 7.6 G/DL — LOW (ref 11.5–15.5)
MCHC RBC-ENTMCNC: 28.1 PG — SIGNIFICANT CHANGE UP (ref 27–34)
MCHC RBC-ENTMCNC: 30.6 GM/DL — LOW (ref 32–36)
MCV RBC AUTO: 91.9 FL — SIGNIFICANT CHANGE UP (ref 80–100)
PLATELET # BLD AUTO: 161 K/UL — SIGNIFICANT CHANGE UP (ref 150–400)
POTASSIUM SERPL-MCNC: 3 MMOL/L — LOW (ref 3.5–5.3)
POTASSIUM SERPL-SCNC: 3 MMOL/L — LOW (ref 3.5–5.3)
RBC # BLD: 2.7 M/UL — LOW (ref 3.8–5.2)
RBC # FLD: 19.9 % — HIGH (ref 10.3–14.5)
SODIUM SERPL-SCNC: 137 MMOL/L — SIGNIFICANT CHANGE UP (ref 135–145)
WBC # BLD: 4.98 K/UL — SIGNIFICANT CHANGE UP (ref 3.8–10.5)
WBC # FLD AUTO: 4.98 K/UL — SIGNIFICANT CHANGE UP (ref 3.8–10.5)

## 2021-11-17 PROCEDURE — 99233 SBSQ HOSP IP/OBS HIGH 50: CPT

## 2021-11-17 RX ORDER — POTASSIUM CHLORIDE 20 MEQ
20 PACKET (EA) ORAL ONCE
Refills: 0 | Status: COMPLETED | OUTPATIENT
Start: 2021-11-17 | End: 2021-11-17

## 2021-11-17 RX ADMIN — GABAPENTIN 100 MILLIGRAM(S): 400 CAPSULE ORAL at 14:17

## 2021-11-17 RX ADMIN — HEPARIN SODIUM 5000 UNIT(S): 5000 INJECTION INTRAVENOUS; SUBCUTANEOUS at 21:11

## 2021-11-17 RX ADMIN — OXYCODONE AND ACETAMINOPHEN 2 TABLET(S): 5; 325 TABLET ORAL at 14:50

## 2021-11-17 RX ADMIN — Medication 200 MILLIGRAM(S): at 10:46

## 2021-11-17 RX ADMIN — PANTOPRAZOLE SODIUM 40 MILLIGRAM(S): 20 TABLET, DELAYED RELEASE ORAL at 05:41

## 2021-11-17 RX ADMIN — SENNA PLUS 2 TABLET(S): 8.6 TABLET ORAL at 21:11

## 2021-11-17 RX ADMIN — HEPARIN SODIUM 5000 UNIT(S): 5000 INJECTION INTRAVENOUS; SUBCUTANEOUS at 05:41

## 2021-11-17 RX ADMIN — OXYCODONE AND ACETAMINOPHEN 2 TABLET(S): 5; 325 TABLET ORAL at 12:31

## 2021-11-17 RX ADMIN — GABAPENTIN 100 MILLIGRAM(S): 400 CAPSULE ORAL at 05:41

## 2021-11-17 RX ADMIN — OXYCODONE AND ACETAMINOPHEN 2 TABLET(S): 5; 325 TABLET ORAL at 18:13

## 2021-11-17 RX ADMIN — Medication 20 MILLIEQUIVALENT(S): at 18:13

## 2021-11-17 RX ADMIN — Medication 0.1 MILLIGRAM(S): at 10:46

## 2021-11-17 RX ADMIN — ATOVAQUONE 1500 MILLIGRAM(S): 750 SUSPENSION ORAL at 10:51

## 2021-11-17 RX ADMIN — Medication 1 DROP(S): at 10:47

## 2021-11-17 RX ADMIN — Medication 1334 MILLIGRAM(S): at 14:17

## 2021-11-17 RX ADMIN — AMLODIPINE BESYLATE 10 MILLIGRAM(S): 2.5 TABLET ORAL at 10:46

## 2021-11-17 RX ADMIN — HEPARIN SODIUM 5000 UNIT(S): 5000 INJECTION INTRAVENOUS; SUBCUTANEOUS at 14:20

## 2021-11-17 RX ADMIN — OXYCODONE AND ACETAMINOPHEN 2 TABLET(S): 5; 325 TABLET ORAL at 05:40

## 2021-11-17 RX ADMIN — Medication 1334 MILLIGRAM(S): at 18:13

## 2021-11-17 RX ADMIN — GABAPENTIN 100 MILLIGRAM(S): 400 CAPSULE ORAL at 21:11

## 2021-11-17 RX ADMIN — Medication 650 MILLIGRAM(S): at 03:05

## 2021-11-17 RX ADMIN — Medication 3 MILLIGRAM(S): at 21:12

## 2021-11-17 RX ADMIN — Medication 650 MILLIGRAM(S): at 04:07

## 2021-11-17 RX ADMIN — Medication 1 DROP(S): at 21:11

## 2021-11-17 RX ADMIN — Medication 50 MILLIGRAM(S): at 10:46

## 2021-11-17 NOTE — PROGRESS NOTE ADULT - ASSESSMENT
40 y/o F with past medical history COPD, depression, epilepsy, GERD, Raynaud's syndrome, heroin abuse, HTN, lupus, RA, sepsis, SLE, smoker, ESRD (initiated on HD 5 weeks ago with tunneled HD catheter placed 10/22/21) presents on 11/15 for fevers over the last 3 days. Her highest temperature was 103.4F at home. She was taking Tylenol over the counter. She feels that her RA has been flaring up because she was been having difficulty moving her arms and legs. Reports headache, cough which is dry with minimal sputum production, soreness at the site of her Tunneled HD catheter, SOB in the mornings since starting HD 5 weeks ago, lower extremity swelling. Denies congestion, runny nose, sore throat, chest pain, palpitations, abdominal pain, N/V, diarrhea/constipation, burning on urination, urinary urgency/frequency. Admission blood cultures 4/4 bottles are growing MRSA and patient has fullness and pain adjacent to dialysis catheter site. Poor historian, history per medical record.     1. Patient admitted with MRSA sepsis probably due to line infection versus IVDU versus endocarditis; also history of ESRD on hemodialysis  - follow up cultures   - serial cbc and monitor temperature   - reviewed prior medical records to evaluate for resistant or atypical pathogens   - nephrology evaluation  - catheter needs to be removed  - will optimize antibiotics to daptomycin q 48 hours, day #2  - tolerating antibiotics without rashes or side effects   - repeat blood cultures in am  - will check echocardiogram---noted no vegetations seen  - watch for withdrawal, consideration for behavioral health evaluation  - continue isolation  2. other issues: per medicine

## 2021-11-17 NOTE — CDI QUERY NOTE - NSCDIOTHERTXTBX_GEN_ALL_CORE_HH
Cause and effect    Please document the relationship between the following conditions: Sepsis and HD Catheter infection site/line infection     A. Sepsis associated with  line infection  B. Sepsis  not associated with  line infection  C. Other (please specify)  D. Unable to determine      SUPPORTING DOCUMENTATION AND/OR CLINICAL EVIDENCE:    Operative Note · Operative FindingsDrDorothy Shaquille Soni requested removal of right anterior chest tunneled dialysis catheter due to positive blood cultures, also recommended by ID. Pt seen at bedside, dressing was removed. Anchoring suture was cut. Line was removed with gentle traction. Pressure was applied to the right IJ/line tract for hemostasis. A dry sterile dressing was placed. Nurses informed to keep HOB elevated > 30 degrees for the next 2-3 hours.    Hospitalist note 11/17 1. MRSA sepsisR/O line sepsisDr Len consult appreciatedCheck 2D echo R/O endocarditisContinue IV daptomycin as per Dr HarringtonConsider removing dialysis catheter    ID note 11/17 right upper chest blood at exit site of tunneled catheter, medially to tunneled catheter is fullness and ?lacerations     soreness at the site of her Tunneled HD catheter,patient has fullness and pain adjacent to dialysis catheter site. Poor historian, history per medical record. 1. Patient admitted with MRSA sepsis probably due to line infection versus IVDU versus endocarditis; also history of ESRD on hemodialysis      Culture Results: Growth in aerobic bottle: Gram Positive Cocci in ClustersGrowth in anaerobic bottle: Gram Positive Cocci in Clusters  ***Blood Panel PCR results on this specimen are availableapproximately 3 hours after the Gram stain result.***

## 2021-11-17 NOTE — PROGRESS NOTE ADULT - ASSESSMENT
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    NATALIA on CKD with HD dependence  -HD 11/15, MRSA bacteremia.   -Will plan for temp cath based on labs/volume status, marcy catheter may require some delay   daily labs   -Maintain phos binders, home meds  -Abx per ID - await clearance to reinsert TDC    HTN  -Oral meds    ANCA Vasculitis  -Still on prednisone  rheum followup      *pt seebn earlier

## 2021-11-18 DIAGNOSIS — N18.9 CHRONIC KIDNEY DISEASE, UNSPECIFIED: ICD-10-CM

## 2021-11-18 LAB
-  AMPICILLIN/SULBACTAM: SIGNIFICANT CHANGE UP
-  CEFAZOLIN: SIGNIFICANT CHANGE UP
-  CLINDAMYCIN: SIGNIFICANT CHANGE UP
-  DAPTOMYCIN: SIGNIFICANT CHANGE UP
-  ERYTHROMYCIN: SIGNIFICANT CHANGE UP
-  GENTAMICIN: SIGNIFICANT CHANGE UP
-  LINEZOLID: SIGNIFICANT CHANGE UP
-  OXACILLIN: SIGNIFICANT CHANGE UP
-  PENICILLIN: SIGNIFICANT CHANGE UP
-  RIFAMPIN: SIGNIFICANT CHANGE UP
-  TETRACYCLINE: SIGNIFICANT CHANGE UP
-  TRIMETHOPRIM/SULFAMETHOXAZOLE: SIGNIFICANT CHANGE UP
-  VANCOMYCIN: SIGNIFICANT CHANGE UP
ANION GAP SERPL CALC-SCNC: 10 MMOL/L — SIGNIFICANT CHANGE UP (ref 5–17)
BUN SERPL-MCNC: 82 MG/DL — HIGH (ref 7–23)
CALCIUM SERPL-MCNC: 8.1 MG/DL — LOW (ref 8.5–10.1)
CHLORIDE SERPL-SCNC: 103 MMOL/L — SIGNIFICANT CHANGE UP (ref 96–108)
CO2 SERPL-SCNC: 26 MMOL/L — SIGNIFICANT CHANGE UP (ref 22–31)
CREAT SERPL-MCNC: 6.55 MG/DL — HIGH (ref 0.5–1.3)
CULTURE RESULTS: SIGNIFICANT CHANGE UP
CULTURE RESULTS: SIGNIFICANT CHANGE UP
GLUCOSE SERPL-MCNC: 91 MG/DL — SIGNIFICANT CHANGE UP (ref 70–99)
HCT VFR BLD CALC: 24.1 % — LOW (ref 34.5–45)
HGB BLD-MCNC: 7.6 G/DL — LOW (ref 11.5–15.5)
MAGNESIUM SERPL-MCNC: 2.2 MG/DL — SIGNIFICANT CHANGE UP (ref 1.6–2.6)
MCHC RBC-ENTMCNC: 28.7 PG — SIGNIFICANT CHANGE UP (ref 27–34)
MCHC RBC-ENTMCNC: 31.5 GM/DL — LOW (ref 32–36)
MCV RBC AUTO: 90.9 FL — SIGNIFICANT CHANGE UP (ref 80–100)
METHOD TYPE: SIGNIFICANT CHANGE UP
ORGANISM # SPEC MICROSCOPIC CNT: SIGNIFICANT CHANGE UP
PHOSPHATE SERPL-MCNC: 3.1 MG/DL — SIGNIFICANT CHANGE UP (ref 2.5–4.5)
PLATELET # BLD AUTO: 199 K/UL — SIGNIFICANT CHANGE UP (ref 150–400)
POTASSIUM SERPL-MCNC: 3.4 MMOL/L — LOW (ref 3.5–5.3)
POTASSIUM SERPL-SCNC: 3.4 MMOL/L — LOW (ref 3.5–5.3)
RBC # BLD: 2.65 M/UL — LOW (ref 3.8–5.2)
RBC # FLD: 19.9 % — HIGH (ref 10.3–14.5)
SARS-COV-2 RNA SPEC QL NAA+PROBE: SIGNIFICANT CHANGE UP
SODIUM SERPL-SCNC: 139 MMOL/L — SIGNIFICANT CHANGE UP (ref 135–145)
SPECIMEN SOURCE: SIGNIFICANT CHANGE UP
SPECIMEN SOURCE: SIGNIFICANT CHANGE UP
WBC # BLD: 5.89 K/UL — SIGNIFICANT CHANGE UP (ref 3.8–10.5)
WBC # FLD AUTO: 5.89 K/UL — SIGNIFICANT CHANGE UP (ref 3.8–10.5)

## 2021-11-18 PROCEDURE — 99232 SBSQ HOSP IP/OBS MODERATE 35: CPT

## 2021-11-18 RX ORDER — POTASSIUM CHLORIDE 20 MEQ
40 PACKET (EA) ORAL ONCE
Refills: 0 | Status: COMPLETED | OUTPATIENT
Start: 2021-11-18 | End: 2021-11-18

## 2021-11-18 RX ADMIN — OXYCODONE AND ACETAMINOPHEN 2 TABLET(S): 5; 325 TABLET ORAL at 21:06

## 2021-11-18 RX ADMIN — OXYCODONE AND ACETAMINOPHEN 2 TABLET(S): 5; 325 TABLET ORAL at 15:04

## 2021-11-18 RX ADMIN — GABAPENTIN 100 MILLIGRAM(S): 400 CAPSULE ORAL at 05:06

## 2021-11-18 RX ADMIN — OXYCODONE AND ACETAMINOPHEN 2 TABLET(S): 5; 325 TABLET ORAL at 08:18

## 2021-11-18 RX ADMIN — Medication 50 MILLIGRAM(S): at 10:00

## 2021-11-18 RX ADMIN — Medication 1 DROP(S): at 21:06

## 2021-11-18 RX ADMIN — OXYCODONE AND ACETAMINOPHEN 2 TABLET(S): 5; 325 TABLET ORAL at 09:18

## 2021-11-18 RX ADMIN — Medication 2 MILLIGRAM(S): at 17:08

## 2021-11-18 RX ADMIN — DAPTOMYCIN 118 MILLIGRAM(S): 500 INJECTION, POWDER, LYOPHILIZED, FOR SOLUTION INTRAVENOUS at 13:24

## 2021-11-18 RX ADMIN — HEPARIN SODIUM 5000 UNIT(S): 5000 INJECTION INTRAVENOUS; SUBCUTANEOUS at 05:06

## 2021-11-18 RX ADMIN — Medication 1 DROP(S): at 10:00

## 2021-11-18 RX ADMIN — PANTOPRAZOLE SODIUM 40 MILLIGRAM(S): 20 TABLET, DELAYED RELEASE ORAL at 05:06

## 2021-11-18 RX ADMIN — OXYCODONE AND ACETAMINOPHEN 2 TABLET(S): 5; 325 TABLET ORAL at 21:36

## 2021-11-18 RX ADMIN — Medication 1334 MILLIGRAM(S): at 17:08

## 2021-11-18 RX ADMIN — GABAPENTIN 100 MILLIGRAM(S): 400 CAPSULE ORAL at 21:06

## 2021-11-18 RX ADMIN — Medication 1334 MILLIGRAM(S): at 08:21

## 2021-11-18 RX ADMIN — Medication 3 MILLIGRAM(S): at 21:06

## 2021-11-18 RX ADMIN — ATOVAQUONE 1500 MILLIGRAM(S): 750 SUSPENSION ORAL at 10:01

## 2021-11-18 RX ADMIN — OXYCODONE AND ACETAMINOPHEN 2 TABLET(S): 5; 325 TABLET ORAL at 15:34

## 2021-11-18 RX ADMIN — GABAPENTIN 100 MILLIGRAM(S): 400 CAPSULE ORAL at 13:24

## 2021-11-18 RX ADMIN — Medication 0.1 MILLIGRAM(S): at 09:52

## 2021-11-18 RX ADMIN — HEPARIN SODIUM 5000 UNIT(S): 5000 INJECTION INTRAVENOUS; SUBCUTANEOUS at 13:22

## 2021-11-18 RX ADMIN — Medication 40 MILLIEQUIVALENT(S): at 14:26

## 2021-11-18 RX ADMIN — OXYCODONE AND ACETAMINOPHEN 2 TABLET(S): 5; 325 TABLET ORAL at 01:36

## 2021-11-18 RX ADMIN — Medication 1334 MILLIGRAM(S): at 13:24

## 2021-11-18 RX ADMIN — Medication 200 MILLIGRAM(S): at 09:52

## 2021-11-18 RX ADMIN — AMLODIPINE BESYLATE 10 MILLIGRAM(S): 2.5 TABLET ORAL at 09:52

## 2021-11-18 NOTE — CONSULT NOTE ADULT - ASSESSMENT
40yo F with CKD, requiring dialysis, TDC was removed several days ago d/t bacteremia, referred to IR for shiley  - Pt with high anxiety  - Consentable  - Covid out of range  - On heparin      Plan for IR shiley placement tomorrow  - Pt with high anxiety, so recommendation is to perform procedure with anesthesia  - Repeat Covid PCR prior  - Repeat AM labs (CBC, CMP)  - Hold AM heparin  - Marcy kimbrough

## 2021-11-18 NOTE — CONSULT NOTE ADULT - CONSULT REASON
40yo F with CKD, requiring dialysis, TDC was removed several days ago d/t bacteremia, referred to IR for corby
Qiana on CKD w HD   fevers
fever

## 2021-11-18 NOTE — PROGRESS NOTE ADULT - ASSESSMENT
40 y/o F with past medical history COPD, depression, epilepsy, GERD, Raynaud's syndrome, heroin abuse, HTN, lupus, RA, sepsis, SLE, smoker, ESRD (initiated on HD 5 weeks ago with tunneled HD catheter placed 10/22/21) presents on 11/15 for fevers over the last 3 days. Her highest temperature was 103.4F at home. She was taking Tylenol over the counter. She feels that her RA has been flaring up because she was been having difficulty moving her arms and legs. Reports headache, cough which is dry with minimal sputum production, soreness at the site of her Tunneled HD catheter, SOB in the mornings since starting HD 5 weeks ago, lower extremity swelling. Denies congestion, runny nose, sore throat, chest pain, palpitations, abdominal pain, N/V, diarrhea/constipation, burning on urination, urinary urgency/frequency. Admission blood cultures 4/4 bottles are growing MRSA and patient has fullness and pain adjacent to dialysis catheter site. Poor historian, history per medical record.     1. Patient admitted with MRSA sepsis probably due to line infection versus IVDU versus endocarditis; also history of ESRD on hemodialysis  - follow up cultures   - serial cbc and monitor temperature   - reviewed prior medical records to evaluate for resistant or atypical pathogens   - nephrology evaluation  - catheter needs to be removed  - will optimize antibiotics to daptomycin q 48 hours, day #3  - tolerating antibiotics without rashes or side effects   - repeat blood cultures in am have been ordered for today, 11/18  - will check echocardiogram---noted no vegetations seen  - watch for withdrawal, consideration for behavioral health evaluation  - continue isolation  2. other issues: per medicine

## 2021-11-18 NOTE — PROGRESS NOTE ADULT - ASSESSMENT
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    NATALIA on CKD with HD dependence  - last HD 11/15, MRSA bacteremia. - TDC removed   -  plan for temp cath Friday based on labs/volume status   - Repeat bcx pending , marcy permcatheter when clearance of bacteremia    -Maintain phos binders, home meds  -Abx per ID - await clearance to reinsert TDC    HTN  -Oral meds    ANCA Vasculitis  -Still on prednisone  rheum followup      *pt seen earlier      d/w aLtesha BAKER

## 2021-11-18 NOTE — CONSULT NOTE ADULT - SUBJECTIVE AND OBJECTIVE BOX
Chief Complaint:  Patient is a 41y old  Female who presents with a chief complaint of needs dialysis      HPI:  40 y/o F with PMH [place], bacteremia, COPD, depression, epilepsy, GERD, Raynaud's syndrome, heroin abuse, HTN, lupus, RA, sepsis, SLE, smoker, ESRD (initiated on HD 5 weeks ago with tunneled HD catheter placed 10/22/21) presents for fevers over the last 3 days. Her highest temperature was 103.4F at home. Pt was found to have bacteremia. TDC was removed by IR several days ago. IR consulted for placement of shiley so she can be dialyzed.     Allergies  morphine (Rash)  morphine (Unknown)    MEDICATIONS  (STANDING):  amLODIPine   Tablet 10 milliGRAM(s) Oral daily  artificial  tears Solution 1 Drop(s) Both EYES two times a day  atovaquone  Suspension 1500 milliGRAM(s) Oral daily  calcium acetate 1334 milliGRAM(s) Oral three times a day with meals  cloNIDine 0.1 milliGRAM(s) Oral daily  DAPTOmycin IVPB 450 milliGRAM(s) IV Intermittent every 48 hours  epoetin amee-epbx (RETACRIT) Injectable 84153 Unit(s) IV Push <User Schedule>  gabapentin 100 milliGRAM(s) Oral three times a day  heparin   Injectable 5000 Unit(s) SubCutaneous every 8 hours  hydroxychloroquine 200 milliGRAM(s) Oral daily  influenza   Vaccine 0.5 milliLiter(s) IntraMuscular once  pantoprazole    Tablet 40 milliGRAM(s) Oral before breakfast  predniSONE   Tablet 50 milliGRAM(s) Oral daily  senna 2 Tablet(s) Oral at bedtime    MEDICATIONS  (PRN):  acetaminophen     Tablet .. 650 milliGRAM(s) Oral every 6 hours PRN Mild Pain (1 - 3)  aluminum hydroxide/magnesium hydroxide/simethicone Suspension 30 milliLiter(s) Oral every 4 hours PRN Dyspepsia  loperamide 2 milliGRAM(s) Oral three times a day PRN Diarrhea  melatonin 3 milliGRAM(s) Oral at bedtime PRN Insomnia  ondansetron Injectable 4 milliGRAM(s) IV Push every 8 hours PRN Nausea and/or Vomiting  oxycodone    5 mG/acetaminophen 325 mG 2 Tablet(s) Oral every 6 hours PRN Severe Pain (7 - 10)      PAST MEDICAL & SURGICAL HISTORY:  Lupus    Rheumatoid arthritis    H/O Raynaud&#x27;s syndrome    Heroin abuse    Smoker    Rheumatoid arthritis    Sepsis    HTN (hypertension)    COPD (chronic obstructive pulmonary disease)    Depression    Epilepsy    GERD (gastroesophageal reflux disease)    Raynaud&#x27;s syndrome without gangrene    Bacteremia    Systemic lupus erythematosus    Aphonia    H/O tubal ligation    H/O appendicitis    Gall bladder stones    H/O tracheostomy    S/P percutaneous endoscopic gastrostomy (PEG) tube placement        FAMILY HISTORY:  Family history of cardiomyopathy (Mother)      Vital Signs Last 24 Hrs  T(C): 36.2 (18 Nov 2021 08:04), Max: 36.8 (17 Nov 2021 16:07)  T(F): 97.1 (18 Nov 2021 08:04), Max: 98.3 (17 Nov 2021 16:07)  HR: 94 (18 Nov 2021 08:04) (92 - 100)  BP: 152/96 (18 Nov 2021 08:04) (136/90 - 152/97)  BP(mean): --  RR: 16 (18 Nov 2021 08:04) (16 - 18)  SpO2: 97% (18 Nov 2021 08:04) (94% - 98%)      CBC                        7.6    5.89  )-----------( 199      ( 18 Nov 2021 12:00 )             24.1       Chemistry  11-18    139  |  103  |  82<H>  ----------------------------<  91  3.4<L>   |  26  |  6.55<H>    Ca    8.1<L>      18 Nov 2021 12:00  Phos  3.1     11-18  Mg     2.2     11-18            
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 Her highest reported  temperature was 103.4F at home.  Reports headache, cough which is dry with minimal sputum production, soreness at the site of her Tunneled HD catheter, SOB in the mornings since starting HD 5 weeks ago, lower extremity swelling.   Pt admites to drinking more water since DC    Of note, pt had a prior urine culture 10/11/2021 showed >100,000 of Enterobacter cloacae and a wound culture from 7/13/2020 which showed MRSA. She was supposed to go to HD today and called Dr. connors office who instructed her to come to the ER. Of note, pt has a history of bacteremia and had a tracheostomy tube placed and PEG.     ER course: -109. Labs: Hb 9.2, , BUN 67, Cr 5.92, Albumin 2.8, GFR 8. UA: total protein 510, protein/Cr ratio 10.6, protein 500, large blood, RBC 25-50, mode  today feelig weak       PAST MEDICAL & SURGICAL HISTORY:  Lupus    Rheumatoid arthritis    H/O Raynaud&#x27;s syndrome    Heroin abuse    Smoker    Rheumatoid arthritis    Sepsis    HTN (hypertension)    COPD (chronic obstructive pulmonary disease)    Depression    Epilepsy    GERD (gastroesophageal reflux disease)    Raynaud&#x27;s syndrome without gangrene    Bacteremia    Systemic lupus erythematosus    Aphonia    H/O tubal ligation    H/O appendicitis    Gall bladder stones    H/O tracheostomy    S/P percutaneous endoscopic gastrostomy (PEG) tube placement        MEDICATIONS  (STANDING):  amLODIPine   Tablet 10 milliGRAM(s) Oral daily  artificial  tears Solution 1 Drop(s) Both EYES two times a day  atovaquone  Suspension 1500 milliGRAM(s) Oral daily  calcium acetate 1334 milliGRAM(s) Oral three times a day with meals  cloNIDine 0.1 milliGRAM(s) Oral daily  epoetin amee-epbx (RETACRIT) Injectable 01983 Unit(s) IV Push <User Schedule>  gabapentin 100 milliGRAM(s) Oral three times a day  heparin   Injectable 5000 Unit(s) SubCutaneous every 8 hours  hydroxychloroquine 200 milliGRAM(s) Oral daily  pantoprazole    Tablet 40 milliGRAM(s) Oral before breakfast  piperacillin/tazobactam IVPB.. 3.375 Gram(s) IV Intermittent every 12 hours  predniSONE   Tablet 50 milliGRAM(s) Oral daily  senna 2 Tablet(s) Oral at bedtime      Allergies    morphine (Rash)  morphine (Unknown)    Intolerances        SOCIAL HISTORY:  Denies ETOh,Smoking,     FAMILY HISTORY:  Family history of cardiomyopathy (Mother)        REVIEW OF SYSTEMS:    CONSTITUTIONAL: No weakness, fevers or chills  EYES/ENT: No visual changes;  No vertigo or throat pain   NECK: No pain or stiffness  RESPIRATORY: No cough, wheezing, hemoptysis; No shortness of breath  CARDIOVASCULAR: No chest pain or palpitations  GASTROINTESTINAL: No abdominal or epigastric pain. No nausea, vomiting, or hematemesis; No diarrhea or constipation. No melena or hematochezia.  GENITOURINARY: No dysuria, frequency or hematuria  NEUROLOGICAL: No numbness or weakness  SKIN: No itching, burning, rashes, or lesions   All other review of systems is negative unless indicated above.    VITAL:  T(C): , Max: 37.7 (11-15-21 @ 16:00)  T(F): , Max: 99.8 (11-15-21 @ 16:00)  HR: 115 (11-15-21 @ 22:02)  BP: 137/80 (11-15-21 @ 22:02)  BP(mean): 107 (11-15-21 @ 13:59)  RR: 16 (11-15-21 @ 22:02)  SpO2: 98% (11-15-21 @ 21:20)  Wt(kg): --    I and O's:    Height (cm): 165.1 (11-15 @ 13:59)  Weight (kg): 54.2 (11-15 @ 22:02)  BMI (kg/m2): 19.9 (11-15 @ 22:02)  BSA (m2): 1.59 (11-15 @ 22:02)    PHYSICAL EXAM:    Constitutional: NAD  HEENT: PERRLA, EOMI,  MMM  Neck: No LAD, No JVD  Respiratory: CTAB  Cardiovascular: S1 and S2  Gastrointestinal: BS+, soft, NT/ND  Extremities: No peripheral edema  Neurological: A/O x 3, no focal deficits  : No Salgado  Skin: No rashes  Access: Not applicable    LABS:                        9.2    4.83  )-----------( 140      ( 15 Nov 2021 14:48 )             29.8     11-15    135  |  97  |  67<H>  ----------------------------<  88  4.2   |  29  |  5.92<H>    Ca    8.7      15 Nov 2021 14:48    TPro  7.0  /  Alb  2.8<L>  /  TBili  0.3  /  DBili  x   /  AST  15  /  ALT  15  /  AlkPhos  82  11-15      Urine Studies:          RADIOLOGY & ADDITIONAL STUDIES:                
Patient is a 41y old  Female who presents with a chief complaint of Fevers (16 Nov 2021 09:47)    HPI:  40 y/o F with past medical history COPD, depression, epilepsy, GERD, Raynaud's syndrome, heroin abuse, HTN, lupus, RA, sepsis, SLE, smoker, ESRD (initiated on HD 5 weeks ago with tunneled HD catheter placed 10/22/21) presents on 11/15 for fevers over the last 3 days. Her highest temperature was 103.4F at home. She was taking Tylenol over the counter. She feels that her RA has been flaring up because she was been having difficulty moving her arms and legs. Reports headache, cough which is dry with minimal sputum production, soreness at the site of her Tunneled HD catheter, SOB in the mornings since starting HD 5 weeks ago, lower extremity swelling. Denies congestion, runny nose, sore throat, chest pain, palpitations, abdominal pain, N/V, diarrhea/constipation, burning on urination, urinary urgency/frequency. Admission blood cultures 4/4 bottles are growing MRSA and patient has fullness and pain adjacent to dialysis catheter site. Poor historian, history per medical record.     PMH: as above  PSH: as above  Meds: per reconciliation sheet, noted below  MEDICATIONS  (STANDING):  amLODIPine   Tablet 10 milliGRAM(s) Oral daily  artificial  tears Solution 1 Drop(s) Both EYES two times a day  atovaquone  Suspension 1500 milliGRAM(s) Oral daily  calcium acetate 1334 milliGRAM(s) Oral three times a day with meals  cloNIDine 0.1 milliGRAM(s) Oral daily  DAPTOmycin IVPB 450 milliGRAM(s) IV Intermittent every 48 hours  epoetin amee-epbx (RETACRIT) Injectable 06889 Unit(s) IV Push <User Schedule>  gabapentin 100 milliGRAM(s) Oral three times a day  heparin   Injectable 5000 Unit(s) SubCutaneous every 8 hours  hydroxychloroquine 200 milliGRAM(s) Oral daily  influenza   Vaccine 0.5 milliLiter(s) IntraMuscular once  pantoprazole    Tablet 40 milliGRAM(s) Oral before breakfast  predniSONE   Tablet 50 milliGRAM(s) Oral daily  senna 2 Tablet(s) Oral at bedtime    MEDICATIONS  (PRN):  acetaminophen     Tablet .. 650 milliGRAM(s) Oral every 6 hours PRN Mild Pain (1 - 3)  aluminum hydroxide/magnesium hydroxide/simethicone Suspension 30 milliLiter(s) Oral every 4 hours PRN Dyspepsia  loperamide 2 milliGRAM(s) Oral three times a day PRN Diarrhea  melatonin 3 milliGRAM(s) Oral at bedtime PRN Insomnia  ondansetron Injectable 4 milliGRAM(s) IV Push every 8 hours PRN Nausea and/or Vomiting  oxycodone    5 mG/acetaminophen 325 mG 2 Tablet(s) Oral every 6 hours PRN Severe Pain (7 - 10)    Allergies    morphine (Rash)  morphine (Unknown)    Intolerances      Social: no smoking, no alcohol, no illegal drugs; no recent travel, no exposure to TB  FAMILY HISTORY:  Family history of cardiomyopathy (Mother)       ROS unable to obtain secondary to patient medical condition     Vital Signs Last 24 Hrs  T(C): 37.2 (16 Nov 2021 09:48), Max: 37.7 (15 Nov 2021 16:00)  T(F): 99 (16 Nov 2021 09:48), Max: 99.8 (15 Nov 2021 16:00)  HR: 106 (16 Nov 2021 09:48) (102 - 115)  BP: 150/89 (16 Nov 2021 09:48) (125/77 - 150/89)  BP(mean): 107 (15 Nov 2021 13:59) (107 - 107)  RR: 17 (16 Nov 2021 09:48) (15 - 18)  SpO2: 93% (16 Nov 2021 09:48) (92% - 98%)  Daily Height in cm: 165.1 (15 Nov 2021 13:59)    Daily     PE:    Constitutional: frail looking  HEENT: NC/AT, EOMI, PERRLA, conjunctivae clear; ears and nose atraumatic; pharynx clear  Neck: supple; thyroid not palpable  Back: no tenderness  Respiratory: respiratory effort normal; clear to auscultation  Cardiovascular: S1S2 regular, no murmurs  Abdomen: soft, not tender, not distended, positive BS; no liver or spleen organomegaly  Genitourinary: no suprapubic tenderness  Musculoskeletal: no muscle tenderness, no joint swelling or tenderness  Neurological/ Psychiatric: AxOx3, judgement and insight normal;  moving all extremities  Skin: no rashes; no palpable lesions; too numerous to count track marks; right upper chest blood at exit site of tunneled catheter, medially to tunneled catheter is fullness and ?lacerations    Labs: all available labs reviewed                        9.2    4.83  )-----------( 140      ( 15 Nov 2021 14:48 )             29.8     11-15    135  |  97  |  67<H>  ----------------------------<  88  4.2   |  29  |  5.92<H>    Ca    8.7      15 Nov 2021 14:48    TPro  7.0  /  Alb  2.8<L>  /  TBili  0.3  /  DBili  x   /  AST  15  /  ALT  15  /  AlkPhos  82  11-15     LIVER FUNCTIONS - ( 15 Nov 2021 14:48 )  Alb: 2.8 g/dL / Pro: 7.0 gm/dL / ALK PHOS: 82 U/L / ALT: 15 U/L / AST: 15 U/L / GGT: x             Culture - Blood (11.15.21 @ 14:48)    -  Methicillin resistant Staphylococcus aureus (MRSA): Detec    Gram Stain:   Growth in aerobic bottle: Gram Positive Cocci in Clusters  Growth in anaerobic bottle: Gram Positive Cocci in Clusters    Specimen Source: .Blood None    Organism: Blood Culture PCR    Culture Results:   Growth in aerobic bottle: Gram Positive Cocci in Clusters  Growth in anaerobic bottle: Gram Positive Cocci in Clusters  ***Blood Panel PCR results on this specimen are available  approximately 3 hours after the Gram stain result.***  Gram stain, PCR, and/or culture results may not always  correspond due to difference in methodologies.  ************************************************************  This PCR assay was performed by multiplex PCR. This  Assay tests for 66 bacterial and resistance gene targets.  Please refer to the [facility] test directory  at [email or web address] for details.    Organism Identification: Blood Culture PCR    Method Type: PCR                      Culture Results:   Growth in aerobic bottle: Gram Positive Cocci in Clusters  Growth in anaerobic bottle: Gram Positive Cocci in Clusters  ***Blood Panel PCR results on this specimen are available  approximately 3 hours after the Gram stain result.***  Gram stain, PCR, and/or culture results may not always  correspond due to difference in methodologies.  ************************************************************  This PCR assay was performed by multiplex PCR. This  Assay tests for 66 bacterial and resistance gene targets.  Please refer to the [facility] test directory  at [email or web address] for details. (11-15 @ 14:48)  Culture Results:   Growth in aerobic bottle: Gram Positive Cocci in Clusters  Growth in anaerobic bottle: Gram Positive Cocci in Clusters (11-15 @ 14:48)    Radiology: all available radiological tests reviewed    Advanced directives addressed: full resuscitation

## 2021-11-19 LAB
ANION GAP SERPL CALC-SCNC: 10 MMOL/L — SIGNIFICANT CHANGE UP (ref 5–17)
BUN SERPL-MCNC: 115 MG/DL — HIGH (ref 7–23)
CALCIUM SERPL-MCNC: 7.9 MG/DL — LOW (ref 8.5–10.1)
CHLORIDE SERPL-SCNC: 104 MMOL/L — SIGNIFICANT CHANGE UP (ref 96–108)
CO2 SERPL-SCNC: 23 MMOL/L — SIGNIFICANT CHANGE UP (ref 22–31)
CREAT SERPL-MCNC: 7.35 MG/DL — HIGH (ref 0.5–1.3)
GLUCOSE SERPL-MCNC: 88 MG/DL — SIGNIFICANT CHANGE UP (ref 70–99)
GRAM STN FLD: SIGNIFICANT CHANGE UP
HCT VFR BLD CALC: 23.9 % — LOW (ref 34.5–45)
HGB BLD-MCNC: 7.2 G/DL — LOW (ref 11.5–15.5)
MCHC RBC-ENTMCNC: 27.9 PG — SIGNIFICANT CHANGE UP (ref 27–34)
MCHC RBC-ENTMCNC: 30.1 GM/DL — LOW (ref 32–36)
MCV RBC AUTO: 92.6 FL — SIGNIFICANT CHANGE UP (ref 80–100)
PCP SPEC-MCNC: SIGNIFICANT CHANGE UP
PLATELET # BLD AUTO: 210 K/UL — SIGNIFICANT CHANGE UP (ref 150–400)
POTASSIUM SERPL-MCNC: 4.2 MMOL/L — SIGNIFICANT CHANGE UP (ref 3.5–5.3)
POTASSIUM SERPL-SCNC: 4.2 MMOL/L — SIGNIFICANT CHANGE UP (ref 3.5–5.3)
RBC # BLD: 2.58 M/UL — LOW (ref 3.8–5.2)
RBC # FLD: 20.1 % — HIGH (ref 10.3–14.5)
SODIUM SERPL-SCNC: 137 MMOL/L — SIGNIFICANT CHANGE UP (ref 135–145)
SPECIMEN SOURCE: SIGNIFICANT CHANGE UP
SPECIMEN SOURCE: SIGNIFICANT CHANGE UP
WBC # BLD: 6.33 K/UL — SIGNIFICANT CHANGE UP (ref 3.8–10.5)
WBC # FLD AUTO: 6.33 K/UL — SIGNIFICANT CHANGE UP (ref 3.8–10.5)

## 2021-11-19 PROCEDURE — 36556 INSERT NON-TUNNEL CV CATH: CPT

## 2021-11-19 PROCEDURE — 99232 SBSQ HOSP IP/OBS MODERATE 35: CPT

## 2021-11-19 PROCEDURE — 77001 FLUOROGUIDE FOR VEIN DEVICE: CPT | Mod: 26

## 2021-11-19 PROCEDURE — 76937 US GUIDE VASCULAR ACCESS: CPT | Mod: 26

## 2021-11-19 RX ORDER — SODIUM CHLORIDE 9 MG/ML
10 INJECTION INTRAMUSCULAR; INTRAVENOUS; SUBCUTANEOUS
Refills: 0 | Status: DISCONTINUED | OUTPATIENT
Start: 2021-11-19 | End: 2021-11-24

## 2021-11-19 RX ORDER — CHLORHEXIDINE GLUCONATE 213 G/1000ML
1 SOLUTION TOPICAL
Refills: 0 | Status: DISCONTINUED | OUTPATIENT
Start: 2021-11-19 | End: 2021-11-24

## 2021-11-19 RX ORDER — FUROSEMIDE 40 MG
80 TABLET ORAL ONCE
Refills: 0 | Status: DISCONTINUED | OUTPATIENT
Start: 2021-11-19 | End: 2021-11-23

## 2021-11-19 RX ADMIN — HEPARIN SODIUM 5000 UNIT(S): 5000 INJECTION INTRAVENOUS; SUBCUTANEOUS at 21:21

## 2021-11-19 RX ADMIN — OXYCODONE AND ACETAMINOPHEN 2 TABLET(S): 5; 325 TABLET ORAL at 03:13

## 2021-11-19 RX ADMIN — Medication 0.1 MILLIGRAM(S): at 08:16

## 2021-11-19 RX ADMIN — Medication 3 MILLIGRAM(S): at 21:27

## 2021-11-19 RX ADMIN — OXYCODONE AND ACETAMINOPHEN 2 TABLET(S): 5; 325 TABLET ORAL at 03:43

## 2021-11-19 RX ADMIN — Medication 50 MILLIGRAM(S): at 20:05

## 2021-11-19 RX ADMIN — Medication 200 MILLIGRAM(S): at 20:06

## 2021-11-19 RX ADMIN — OXYCODONE AND ACETAMINOPHEN 2 TABLET(S): 5; 325 TABLET ORAL at 16:07

## 2021-11-19 RX ADMIN — GABAPENTIN 100 MILLIGRAM(S): 400 CAPSULE ORAL at 21:21

## 2021-11-19 RX ADMIN — Medication 1334 MILLIGRAM(S): at 20:05

## 2021-11-19 RX ADMIN — Medication 1 DROP(S): at 21:20

## 2021-11-19 RX ADMIN — OXYCODONE AND ACETAMINOPHEN 2 TABLET(S): 5; 325 TABLET ORAL at 20:37

## 2021-11-19 RX ADMIN — ATOVAQUONE 1500 MILLIGRAM(S): 750 SUSPENSION ORAL at 20:26

## 2021-11-19 RX ADMIN — Medication 2 MILLIGRAM(S): at 03:15

## 2021-11-19 RX ADMIN — OXYCODONE AND ACETAMINOPHEN 2 TABLET(S): 5; 325 TABLET ORAL at 09:34

## 2021-11-19 RX ADMIN — Medication 650 MILLIGRAM(S): at 21:27

## 2021-11-19 RX ADMIN — OXYCODONE AND ACETAMINOPHEN 2 TABLET(S): 5; 325 TABLET ORAL at 15:44

## 2021-11-19 RX ADMIN — ERYTHROPOIETIN 10000 UNIT(S): 10000 INJECTION, SOLUTION INTRAVENOUS; SUBCUTANEOUS at 18:28

## 2021-11-19 RX ADMIN — Medication 1 DROP(S): at 11:53

## 2021-11-19 RX ADMIN — OXYCODONE AND ACETAMINOPHEN 2 TABLET(S): 5; 325 TABLET ORAL at 20:07

## 2021-11-19 RX ADMIN — Medication 650 MILLIGRAM(S): at 21:57

## 2021-11-19 RX ADMIN — AMLODIPINE BESYLATE 10 MILLIGRAM(S): 2.5 TABLET ORAL at 08:16

## 2021-11-19 NOTE — PROGRESS NOTE ADULT - ASSESSMENT
42 y/o F with past medical history COPD, depression, epilepsy, GERD, Raynaud's syndrome, heroin abuse, HTN, lupus, RA, sepsis, SLE, smoker, ESRD (initiated on HD 5 weeks ago with tunneled HD catheter placed 10/22/21) presents on 11/15 for fevers over the last 3 days. Her highest temperature was 103.4F at home. She was taking Tylenol over the counter. She feels that her RA has been flaring up because she was been having difficulty moving her arms and legs. Reports headache, cough which is dry with minimal sputum production, soreness at the site of her Tunneled HD catheter, SOB in the mornings since starting HD 5 weeks ago, lower extremity swelling. Denies congestion, runny nose, sore throat, chest pain, palpitations, abdominal pain, N/V, diarrhea/constipation, burning on urination, urinary urgency/frequency. Admission blood cultures 4/4 bottles are growing MRSA and patient has fullness and pain adjacent to dialysis catheter site. Poor historian, history per medical record.     1. Patient admitted with MRSA sepsis probably due to line infection versus IVDU versus endocarditis; also history of ESRD on hemodialysis  - follow up cultures   - serial cbc and monitor temperature   - reviewed prior medical records to evaluate for resistant or atypical pathogens   - nephrology evaluation  - catheter needs to be removed  - will optimize antibiotics to daptomycin q 48 hours, day #4  - tolerating antibiotics without rashes or side effects   - repeat blood cultures are growing persistent Staph; however patient needs dialysis  - to have temporary shiley catheter placed and to have dialysis today and tomorrow  - will then repeat blood cultures on 11/22  - will check echocardiogram---noted no vegetations seen  - continue isolation  - nephrology to decide on further shiley use and removal  - d/w nephrology  2. other issues: per medicine

## 2021-11-19 NOTE — ADVANCED PRACTICE NURSE CONSULT - RECOMMEDATIONS
1) Aquacel AG to left LE wound, Telfa and Kerlix change every other day   2) Foam dressing to sacral wound for patient comfort   3) Registered Dietician rounding on patient   4) Ambulate patient often

## 2021-11-19 NOTE — PROGRESS NOTE ADULT - ASSESSMENT
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    NATALIA on CKD with HD dependence  - last HD 11/15, MRSA bacteremia. - TDC removed    sob and fluid overloaded   -  plan for temp cath today and HD today and tomorrow y based on labs/volume status   - Repeat bcx +, repeat cx Monday per ID   , marcy permcatheter when clearance of bacteremia    -Maintain phos binders, home meds  -Abx per ID - await clearance to reinsert TDC    HTN  -Oral meds    ANCA Vasculitis  -Still on prednisone  rheum followup      *pt seen earlier      d/w Latesha hutchinson weekend          40 y/o F with PMH bacteremia, IVDA, COPD, depression, epilepsy, GERD, Raynaud's syndrome, heroin abuse, HTN, lupus, RA, sepsis, SLE, smoker, NATALIA on CKD 4  (initiated on HD 5 weeks ago with tunneled HD catheter placed 10/22/21) w kidney bx 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    NATALIA on CKD with HD dependence - Cocicaine reelted ANCA vasculitis   - last HD 11/15, MRSA bacteremia. - TDC removed    sob and fluid overloaded   -  plan for temp cath today and HD today and tomorrow y based on labs/volume status   - Repeat bcx +, repeat cx Monday per ID   , marcy permcatheter when clearance of bacteremia    -Maintain phos binders, home meds  -Abx per ID - await clearance to reinsert TDC    Anemia   PRBC with HD today    TAMARA w HD     HTN  -Oral meds    ANCA Vasculitis  -Still on prednisone  rheum followup      *pt seen earlier      d/w Latesha hutchinson weekend

## 2021-11-19 NOTE — ADVANCED PRACTICE NURSE CONSULT - ASSESSMENT
This is a 41 year old female admitted to the hospital on 11/15/2021 for fevers over the last three days.  PMH [place], bacteremia, COPD, depression, epilepsy, GERD, Raynaud's syndrome, heroin abuse, HTN, lupus, RA, sepsis, SLE, smoker, ESRD (initiated on HD 5 weeks ago with tunneled HD catheter placed 10/22/21). Assessed patient on 5 east. Patient was lying in bed positioned to her left side. She is alert and oriented x's 4, and able to turn and position self. Upon assessment noted a wound to sacrum measuring 6cm x 3.5cm x unknown related to 100% stable eshcar/scab to sacrum. This wound can be classified as an Unstadgeable. Foam placed over wound and will contineu to montor. Patient denies pain and reported that the foam aid in her comfort and also reported that the wound is progressing well. She has had this wound for over 4 months.  Wounds to left LE measure Left Anterior wound 2.2cm x 1.5cm x 0.1 cm and Left Lateral LE wound 2.1cm x 1.8cm x 0.1cm both with serosanguinous and clear fluid. Patient repots wounds have been present for a couple of weeks. Place a Silver Alginate on both wound Telfa and wrapped with Kerlix The goal os this dressing is to decrease bacterial load RT the silver impregnated dressing, accepting drainage and donating some moisture to the wound for successful healing. Place Cavilon to periwound to protect from drainage.

## 2021-11-20 LAB
ALBUMIN SERPL ELPH-MCNC: 2.3 G/DL — LOW (ref 3.3–5)
ANION GAP SERPL CALC-SCNC: 10 MMOL/L — SIGNIFICANT CHANGE UP (ref 5–17)
BUN SERPL-MCNC: 59 MG/DL — HIGH (ref 7–23)
CALCIUM SERPL-MCNC: 7.8 MG/DL — LOW (ref 8.5–10.1)
CHLORIDE SERPL-SCNC: 99 MMOL/L — SIGNIFICANT CHANGE UP (ref 96–108)
CO2 SERPL-SCNC: 24 MMOL/L — SIGNIFICANT CHANGE UP (ref 22–31)
CREAT SERPL-MCNC: 4.55 MG/DL — HIGH (ref 0.5–1.3)
CULTURE RESULTS: SIGNIFICANT CHANGE UP
CULTURE RESULTS: SIGNIFICANT CHANGE UP
GLUCOSE SERPL-MCNC: 206 MG/DL — HIGH (ref 70–99)
HCT VFR BLD CALC: 30.2 % — LOW (ref 34.5–45)
HGB BLD-MCNC: 9.7 G/DL — LOW (ref 11.5–15.5)
MCHC RBC-ENTMCNC: 28.7 PG — SIGNIFICANT CHANGE UP (ref 27–34)
MCHC RBC-ENTMCNC: 32.1 GM/DL — SIGNIFICANT CHANGE UP (ref 32–36)
MCV RBC AUTO: 89.3 FL — SIGNIFICANT CHANGE UP (ref 80–100)
PHOSPHATE SERPL-MCNC: 3.4 MG/DL — SIGNIFICANT CHANGE UP (ref 2.5–4.5)
PLATELET # BLD AUTO: 219 K/UL — SIGNIFICANT CHANGE UP (ref 150–400)
POTASSIUM SERPL-MCNC: 4.2 MMOL/L — SIGNIFICANT CHANGE UP (ref 3.5–5.3)
POTASSIUM SERPL-SCNC: 4.2 MMOL/L — SIGNIFICANT CHANGE UP (ref 3.5–5.3)
RBC # BLD: 3.38 M/UL — LOW (ref 3.8–5.2)
RBC # FLD: 18.4 % — HIGH (ref 10.3–14.5)
SODIUM SERPL-SCNC: 133 MMOL/L — LOW (ref 135–145)
SPECIMEN SOURCE: SIGNIFICANT CHANGE UP
SPECIMEN SOURCE: SIGNIFICANT CHANGE UP
WBC # BLD: 7.74 K/UL — SIGNIFICANT CHANGE UP (ref 3.8–10.5)
WBC # FLD AUTO: 7.74 K/UL — SIGNIFICANT CHANGE UP (ref 3.8–10.5)

## 2021-11-20 PROCEDURE — 99232 SBSQ HOSP IP/OBS MODERATE 35: CPT

## 2021-11-20 RX ORDER — LOPERAMIDE HCL 2 MG
2 TABLET ORAL EVERY 6 HOURS
Refills: 0 | Status: DISCONTINUED | OUTPATIENT
Start: 2021-11-20 | End: 2021-11-20

## 2021-11-20 RX ORDER — OXYCODONE AND ACETAMINOPHEN 5; 325 MG/1; MG/1
2 TABLET ORAL EVERY 4 HOURS
Refills: 0 | Status: DISCONTINUED | OUTPATIENT
Start: 2021-11-20 | End: 2021-11-24

## 2021-11-20 RX ADMIN — OXYCODONE AND ACETAMINOPHEN 2 TABLET(S): 5; 325 TABLET ORAL at 02:26

## 2021-11-20 RX ADMIN — HEPARIN SODIUM 5000 UNIT(S): 5000 INJECTION INTRAVENOUS; SUBCUTANEOUS at 22:15

## 2021-11-20 RX ADMIN — ATOVAQUONE 1500 MILLIGRAM(S): 750 SUSPENSION ORAL at 12:54

## 2021-11-20 RX ADMIN — OXYCODONE AND ACETAMINOPHEN 2 TABLET(S): 5; 325 TABLET ORAL at 02:56

## 2021-11-20 RX ADMIN — Medication 2 MILLIGRAM(S): at 17:35

## 2021-11-20 RX ADMIN — DAPTOMYCIN 118 MILLIGRAM(S): 500 INJECTION, POWDER, LYOPHILIZED, FOR SOLUTION INTRAVENOUS at 14:03

## 2021-11-20 RX ADMIN — OXYCODONE AND ACETAMINOPHEN 2 TABLET(S): 5; 325 TABLET ORAL at 14:04

## 2021-11-20 RX ADMIN — GABAPENTIN 100 MILLIGRAM(S): 400 CAPSULE ORAL at 14:04

## 2021-11-20 RX ADMIN — Medication 50 MILLIGRAM(S): at 12:42

## 2021-11-20 RX ADMIN — Medication 1334 MILLIGRAM(S): at 12:42

## 2021-11-20 RX ADMIN — Medication 1 DROP(S): at 12:43

## 2021-11-20 RX ADMIN — HEPARIN SODIUM 5000 UNIT(S): 5000 INJECTION INTRAVENOUS; SUBCUTANEOUS at 14:04

## 2021-11-20 RX ADMIN — Medication 200 MILLIGRAM(S): at 12:42

## 2021-11-20 RX ADMIN — Medication 0.1 MILLIGRAM(S): at 05:52

## 2021-11-20 RX ADMIN — GABAPENTIN 100 MILLIGRAM(S): 400 CAPSULE ORAL at 05:50

## 2021-11-20 RX ADMIN — GABAPENTIN 100 MILLIGRAM(S): 400 CAPSULE ORAL at 22:15

## 2021-11-20 RX ADMIN — Medication 3 MILLIGRAM(S): at 22:15

## 2021-11-20 RX ADMIN — PANTOPRAZOLE SODIUM 40 MILLIGRAM(S): 20 TABLET, DELAYED RELEASE ORAL at 05:51

## 2021-11-20 RX ADMIN — Medication 1 DROP(S): at 22:19

## 2021-11-20 RX ADMIN — OXYCODONE AND ACETAMINOPHEN 2 TABLET(S): 5; 325 TABLET ORAL at 09:30

## 2021-11-20 RX ADMIN — HEPARIN SODIUM 5000 UNIT(S): 5000 INJECTION INTRAVENOUS; SUBCUTANEOUS at 05:53

## 2021-11-20 RX ADMIN — OXYCODONE AND ACETAMINOPHEN 2 TABLET(S): 5; 325 TABLET ORAL at 08:59

## 2021-11-20 RX ADMIN — OXYCODONE AND ACETAMINOPHEN 2 TABLET(S): 5; 325 TABLET ORAL at 18:03

## 2021-11-20 RX ADMIN — OXYCODONE AND ACETAMINOPHEN 2 TABLET(S): 5; 325 TABLET ORAL at 14:30

## 2021-11-20 RX ADMIN — OXYCODONE AND ACETAMINOPHEN 2 TABLET(S): 5; 325 TABLET ORAL at 22:15

## 2021-11-20 RX ADMIN — Medication 1334 MILLIGRAM(S): at 16:46

## 2021-11-20 RX ADMIN — AMLODIPINE BESYLATE 10 MILLIGRAM(S): 2.5 TABLET ORAL at 05:51

## 2021-11-20 NOTE — PROGRESS NOTE ADULT - ASSESSMENT
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    NATALIA on CKD with HD dependence - Cocicaine reelted ANCA vasculitis   - last HD 11/15, MRSA bacteremia. - TDC removed    sob and fluid overloaded   -  plan for temp cath today and HD today and tomorrow y based on labs/volume status   - Repeat bcx +, repeat cx Monday per ID   , marcy permcatheter when clearance of bacteremia    -Maintain phos binders, home meds  -Abx per ID - await clearance to reinsert TDC    Anemia   PRBC with HD today    TAMARA w HD     HTN  -Oral meds    ANCA Vasculitis  -Still on prednisone  rheum followup      *pt seen earlier      11/20 SY  --ESRD : Temporary catheter.   Catheter functioning well.  Tolerating tx well.  --MRSA line sepsis : for repeat blood culture on Monday.  Continue ABTX  --Anemia : continue TAMARA . post tx stable.  --ANCA vasculitis : rheum follow up.  --HTN : acceptable control.  Continue current meds.    Rusty Soni/raeann to resume care 11/22

## 2021-11-20 NOTE — PROVIDER CONTACT NOTE (OTHER) - ACTION/TREATMENT ORDERED:
CODY Irby gave ok to put pt on 3 L nasal cannula. will continue to monitor.
per PA, administer pain medication early.
As per PA, administer daily amlodipine and clonidine early.

## 2021-11-20 NOTE — PROVIDER CONTACT NOTE (OTHER) - SITUATION
provider contacted because pt O2 sat at 90% on 2 L of Nasal cannula. pt has history of COPD and is alert and oriented x4. no SOB.
Tele MD service spoke with Allie to advise MD of routine consult.
pt returned to floor from dialysis. BP elevated 168/117. pt stated pain 10/10 at IJ site.
notified Dr Long's office of admission please fax over discharged paperwork to [phone] once the patient is discharged
pt has history of hypertension. pt going for dialysis this am. /110.

## 2021-11-21 PROCEDURE — 99232 SBSQ HOSP IP/OBS MODERATE 35: CPT

## 2021-11-21 RX ADMIN — Medication 0.1 MILLIGRAM(S): at 09:14

## 2021-11-21 RX ADMIN — HEPARIN SODIUM 5000 UNIT(S): 5000 INJECTION INTRAVENOUS; SUBCUTANEOUS at 13:46

## 2021-11-21 RX ADMIN — Medication 1 DROP(S): at 09:19

## 2021-11-21 RX ADMIN — ATOVAQUONE 1500 MILLIGRAM(S): 750 SUSPENSION ORAL at 09:18

## 2021-11-21 RX ADMIN — OXYCODONE AND ACETAMINOPHEN 2 TABLET(S): 5; 325 TABLET ORAL at 11:22

## 2021-11-21 RX ADMIN — OXYCODONE AND ACETAMINOPHEN 2 TABLET(S): 5; 325 TABLET ORAL at 23:38

## 2021-11-21 RX ADMIN — Medication 1334 MILLIGRAM(S): at 13:46

## 2021-11-21 RX ADMIN — CHLORHEXIDINE GLUCONATE 1 APPLICATION(S): 213 SOLUTION TOPICAL at 05:53

## 2021-11-21 RX ADMIN — HEPARIN SODIUM 5000 UNIT(S): 5000 INJECTION INTRAVENOUS; SUBCUTANEOUS at 21:41

## 2021-11-21 RX ADMIN — GABAPENTIN 100 MILLIGRAM(S): 400 CAPSULE ORAL at 21:42

## 2021-11-21 RX ADMIN — Medication 1334 MILLIGRAM(S): at 09:14

## 2021-11-21 RX ADMIN — Medication 200 MILLIGRAM(S): at 09:15

## 2021-11-21 RX ADMIN — Medication 2 MILLIGRAM(S): at 21:42

## 2021-11-21 RX ADMIN — OXYCODONE AND ACETAMINOPHEN 2 TABLET(S): 5; 325 TABLET ORAL at 15:29

## 2021-11-21 RX ADMIN — OXYCODONE AND ACETAMINOPHEN 2 TABLET(S): 5; 325 TABLET ORAL at 12:22

## 2021-11-21 RX ADMIN — Medication 1334 MILLIGRAM(S): at 16:58

## 2021-11-21 RX ADMIN — OXYCODONE AND ACETAMINOPHEN 2 TABLET(S): 5; 325 TABLET ORAL at 16:23

## 2021-11-21 RX ADMIN — AMLODIPINE BESYLATE 10 MILLIGRAM(S): 2.5 TABLET ORAL at 09:14

## 2021-11-21 RX ADMIN — OXYCODONE AND ACETAMINOPHEN 2 TABLET(S): 5; 325 TABLET ORAL at 20:30

## 2021-11-21 RX ADMIN — Medication 50 MILLIGRAM(S): at 09:20

## 2021-11-21 RX ADMIN — Medication 3 MILLIGRAM(S): at 21:42

## 2021-11-21 RX ADMIN — GABAPENTIN 100 MILLIGRAM(S): 400 CAPSULE ORAL at 15:32

## 2021-11-21 RX ADMIN — Medication 1 DROP(S): at 21:43

## 2021-11-21 RX ADMIN — OXYCODONE AND ACETAMINOPHEN 2 TABLET(S): 5; 325 TABLET ORAL at 19:34

## 2021-11-21 RX ADMIN — OXYCODONE AND ACETAMINOPHEN 2 TABLET(S): 5; 325 TABLET ORAL at 05:56

## 2021-11-21 RX ADMIN — PANTOPRAZOLE SODIUM 40 MILLIGRAM(S): 20 TABLET, DELAYED RELEASE ORAL at 05:54

## 2021-11-21 RX ADMIN — GABAPENTIN 100 MILLIGRAM(S): 400 CAPSULE ORAL at 05:53

## 2021-11-21 NOTE — PROVIDER CONTACT NOTE (CRITICAL VALUE NOTIFICATION) - TEST AND RESULT REPORTED:
11/15 blood cultures both with growth in aerobic bottles gram positive cocci in clusters, blood panel pcr will be ready within 3 hours
Blood Cultures 11/18- preliminary growth in aerobic bottle. Gram positive cocci in clusters
Blood Cx 11/15, both with growth in aerobic bottles gram + cocci in clusters, blood panel PCR will be ready within 3 hours
blood culture collected 11/18 final result growth in aerobic and anaerobic bottles mrsa
Blood Cultures x2 11/15  final report- growth in aerobic and anaerobic bottle +MRSA
Positive blood cultures 11/15  Growth in aerobic & anaerobic bottle: gram + cocci in clusters

## 2021-11-21 NOTE — PROVIDER CONTACT NOTE (CRITICAL VALUE NOTIFICATION) - PERSON GIVING RESULT:
[place]ie [place]
[facility]
facundo biswas
[facility]
FRANCISCO Humphries
Renita

## 2021-11-22 LAB
ALBUMIN SERPL ELPH-MCNC: 2.7 G/DL — LOW (ref 3.3–5)
ANION GAP SERPL CALC-SCNC: 13 MMOL/L — SIGNIFICANT CHANGE UP (ref 5–17)
BASOPHILS # BLD AUTO: 0.06 K/UL — SIGNIFICANT CHANGE UP (ref 0–0.2)
BASOPHILS NFR BLD AUTO: 0.4 % — SIGNIFICANT CHANGE UP (ref 0–2)
BUN SERPL-MCNC: 77 MG/DL — HIGH (ref 7–23)
CALCIUM SERPL-MCNC: 8 MG/DL — LOW (ref 8.5–10.1)
CHLORIDE SERPL-SCNC: 100 MMOL/L — SIGNIFICANT CHANGE UP (ref 96–108)
CO2 SERPL-SCNC: 20 MMOL/L — LOW (ref 22–31)
CREAT SERPL-MCNC: 5 MG/DL — HIGH (ref 0.5–1.3)
EOSINOPHIL # BLD AUTO: 0.08 K/UL — SIGNIFICANT CHANGE UP (ref 0–0.5)
EOSINOPHIL NFR BLD AUTO: 0.5 % — SIGNIFICANT CHANGE UP (ref 0–6)
GLUCOSE SERPL-MCNC: 113 MG/DL — HIGH (ref 70–99)
HCT VFR BLD CALC: 33.5 % — LOW (ref 34.5–45)
HGB BLD-MCNC: 10.7 G/DL — LOW (ref 11.5–15.5)
IMM GRANULOCYTES NFR BLD AUTO: 7.7 % — HIGH (ref 0–1.5)
LYMPHOCYTES # BLD AUTO: 13.6 % — SIGNIFICANT CHANGE UP (ref 13–44)
LYMPHOCYTES # BLD AUTO: 2.19 K/UL — SIGNIFICANT CHANGE UP (ref 1–3.3)
MCHC RBC-ENTMCNC: 28 PG — SIGNIFICANT CHANGE UP (ref 27–34)
MCHC RBC-ENTMCNC: 31.9 GM/DL — LOW (ref 32–36)
MCV RBC AUTO: 87.7 FL — SIGNIFICANT CHANGE UP (ref 80–100)
MONOCYTES # BLD AUTO: 0.93 K/UL — HIGH (ref 0–0.9)
MONOCYTES NFR BLD AUTO: 5.8 % — SIGNIFICANT CHANGE UP (ref 2–14)
NEUTROPHILS # BLD AUTO: 11.63 K/UL — HIGH (ref 1.8–7.4)
NEUTROPHILS NFR BLD AUTO: 72 % — SIGNIFICANT CHANGE UP (ref 43–77)
PHOSPHATE SERPL-MCNC: 2.3 MG/DL — LOW (ref 2.5–4.5)
PLATELET # BLD AUTO: 374 K/UL — SIGNIFICANT CHANGE UP (ref 150–400)
POTASSIUM SERPL-MCNC: 3.7 MMOL/L — SIGNIFICANT CHANGE UP (ref 3.5–5.3)
POTASSIUM SERPL-SCNC: 3.7 MMOL/L — SIGNIFICANT CHANGE UP (ref 3.5–5.3)
RBC # BLD: 3.82 M/UL — SIGNIFICANT CHANGE UP (ref 3.8–5.2)
RBC # FLD: 18.4 % — HIGH (ref 10.3–14.5)
SODIUM SERPL-SCNC: 133 MMOL/L — LOW (ref 135–145)
WBC # BLD: 16.13 K/UL — HIGH (ref 3.8–10.5)
WBC # FLD AUTO: 16.13 K/UL — HIGH (ref 3.8–10.5)

## 2021-11-22 PROCEDURE — 99232 SBSQ HOSP IP/OBS MODERATE 35: CPT

## 2021-11-22 RX ADMIN — Medication 1334 MILLIGRAM(S): at 13:12

## 2021-11-22 RX ADMIN — Medication 1 DROP(S): at 21:24

## 2021-11-22 RX ADMIN — Medication 50 MILLIGRAM(S): at 13:11

## 2021-11-22 RX ADMIN — Medication 3 MILLIGRAM(S): at 21:25

## 2021-11-22 RX ADMIN — Medication 200 MILLIGRAM(S): at 13:12

## 2021-11-22 RX ADMIN — ERYTHROPOIETIN 10000 UNIT(S): 10000 INJECTION, SOLUTION INTRAVENOUS; SUBCUTANEOUS at 11:36

## 2021-11-22 RX ADMIN — GABAPENTIN 100 MILLIGRAM(S): 400 CAPSULE ORAL at 21:25

## 2021-11-22 RX ADMIN — Medication 1334 MILLIGRAM(S): at 07:30

## 2021-11-22 RX ADMIN — ATOVAQUONE 1500 MILLIGRAM(S): 750 SUSPENSION ORAL at 13:13

## 2021-11-22 RX ADMIN — GABAPENTIN 100 MILLIGRAM(S): 400 CAPSULE ORAL at 05:48

## 2021-11-22 RX ADMIN — OXYCODONE AND ACETAMINOPHEN 2 TABLET(S): 5; 325 TABLET ORAL at 11:38

## 2021-11-22 RX ADMIN — Medication 1 DROP(S): at 13:14

## 2021-11-22 RX ADMIN — OXYCODONE AND ACETAMINOPHEN 2 TABLET(S): 5; 325 TABLET ORAL at 07:38

## 2021-11-22 RX ADMIN — OXYCODONE AND ACETAMINOPHEN 2 TABLET(S): 5; 325 TABLET ORAL at 12:08

## 2021-11-22 RX ADMIN — OXYCODONE AND ACETAMINOPHEN 2 TABLET(S): 5; 325 TABLET ORAL at 15:51

## 2021-11-22 RX ADMIN — Medication 0.1 MILLIGRAM(S): at 07:47

## 2021-11-22 RX ADMIN — OXYCODONE AND ACETAMINOPHEN 2 TABLET(S): 5; 325 TABLET ORAL at 00:30

## 2021-11-22 RX ADMIN — HEPARIN SODIUM 5000 UNIT(S): 5000 INJECTION INTRAVENOUS; SUBCUTANEOUS at 21:25

## 2021-11-22 RX ADMIN — GABAPENTIN 100 MILLIGRAM(S): 400 CAPSULE ORAL at 13:11

## 2021-11-22 RX ADMIN — Medication 1334 MILLIGRAM(S): at 17:33

## 2021-11-22 RX ADMIN — HEPARIN SODIUM 5000 UNIT(S): 5000 INJECTION INTRAVENOUS; SUBCUTANEOUS at 05:48

## 2021-11-22 RX ADMIN — AMLODIPINE BESYLATE 10 MILLIGRAM(S): 2.5 TABLET ORAL at 07:48

## 2021-11-22 RX ADMIN — OXYCODONE AND ACETAMINOPHEN 2 TABLET(S): 5; 325 TABLET ORAL at 03:42

## 2021-11-22 RX ADMIN — OXYCODONE AND ACETAMINOPHEN 2 TABLET(S): 5; 325 TABLET ORAL at 20:20

## 2021-11-22 RX ADMIN — DAPTOMYCIN 118 MILLIGRAM(S): 500 INJECTION, POWDER, LYOPHILIZED, FOR SOLUTION INTRAVENOUS at 14:31

## 2021-11-22 RX ADMIN — CHLORHEXIDINE GLUCONATE 1 APPLICATION(S): 213 SOLUTION TOPICAL at 05:48

## 2021-11-22 RX ADMIN — OXYCODONE AND ACETAMINOPHEN 2 TABLET(S): 5; 325 TABLET ORAL at 04:35

## 2021-11-22 RX ADMIN — PANTOPRAZOLE SODIUM 40 MILLIGRAM(S): 20 TABLET, DELAYED RELEASE ORAL at 05:48

## 2021-11-22 RX ADMIN — HEPARIN SODIUM 5000 UNIT(S): 5000 INJECTION INTRAVENOUS; SUBCUTANEOUS at 13:15

## 2021-11-22 NOTE — PROGRESS NOTE ADULT - ASSESSMENT
42 y/o F with past medical history COPD, depression, epilepsy, GERD, Raynaud's syndrome, heroin abuse, HTN, lupus, RA, sepsis, SLE, smoker, ESRD (initiated on HD 5 weeks ago with tunneled HD catheter placed 10/22/21) presents on 11/15 for fevers over the last 3 days. Her highest temperature was 103.4F at home. She was taking Tylenol over the counter. She feels that her RA has been flaring up because she was been having difficulty moving her arms and legs. Reports headache, cough which is dry with minimal sputum production, soreness at the site of her Tunneled HD catheter, SOB in the mornings since starting HD 5 weeks ago, lower extremity swelling. Denies congestion, runny nose, sore throat, chest pain, palpitations, abdominal pain, N/V, diarrhea/constipation, burning on urination, urinary urgency/frequency. Admission blood cultures 4/4 bottles are growing MRSA and patient has fullness and pain adjacent to dialysis catheter site. Poor historian, history per medical record.     1. Patient admitted with MRSA sepsis probably due to line infection versus IVDU versus endocarditis; also history of ESRD on hemodialysis  - follow up cultures   - serial cbc and monitor temperature   - reviewed prior medical records to evaluate for resistant or atypical pathogens   - nephrology evaluation  - will optimize antibiotics to daptomycin q 48 hours, day #7  - tolerating antibiotics without rashes or side effects   - repeat blood cultures are growing persistent Staph; however patient needs dialysis  - will have shiley removed on 11/22  - will then repeat blood cultures on 11/24, there are repeat blood cultures from 11/22, they may still be positive, however the shiley was still in place  - will check echocardiogram---noted no vegetations seen  - continue isolation  - d/w nephrology  2. other issues: per medicine

## 2021-11-22 NOTE — PROGRESS NOTE ADULT - ASSESSMENT
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    NATALIA on CKD with HD dependence - Cocicaine reelted ANCA vasculitis   now with MRSA bacteremia    sob and fluid overloaded required insertion of shiley while awaitinng clearance of bcx    HD Fri/sat and today 11/22 - plan for removal of shiley catheter today and line holiday  - advised Medicine NP    repeat Bcx ( latest on 11/18 still +)    reninsertion of TDC once cleared by ID    when clearance of bacteremia    if BCx remains + then needs GRAHAM   daily labs    lasix iV to keep euvolemic while awaiting HD catheter     Anemia   s/p PRBC with HD   TAMARA w HD     HTN  -Oral meds    ANCA Vasculitis  -Still on prednisone  rheum followup          d/w Medicine NP daysi and Dr Light  d/w Dr Flori Harrington      resume HD [facility] once cleared          40 y/o F with PMH bacteremia, IVDA, COPD, depression, epilepsy, GERD, Raynaud's syndrome, heroin abuse, HTN, lupus, RA, sepsis, SLE, smoker, NATALIA on CKD 4  (initiated on HD 5 weeks ago with tunneled HD catheter placed 10/22/21) w kidney bx 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    NATALIA on CKD with HD dependence - Cocicaine reelted ANCA vasculitis   now with MRSA bacteremia    sob and fluid overloaded required insertion of shiley while awaitinng clearance of bcx    HD Fri/sat and today 11/22 - plan for removal of shiley catheter today and line holiday  - advised Medicine NP    repeat Bcx ( latest on 11/18 still +)    reninsertion of TDC once cleared by ID    when clearance of bacteremia    if BCx remains + then needs GRAHAM   daily labs    lasix iV to keep euvolemic while awaiting HD catheter    daily labs     Anemia   s/p PRBC with HD   TAMARA w HD     HTN  -Oral meds    ANCA Vasculitis  -Still on prednisone  rheum followup     Hyperphos    holding CaAcetate w low Phos - resume once rises        d/w Medicine NP daysi and Dr Light  d/w Dr Flori Harrington      resume HD [facility] once cleared

## 2021-11-23 DIAGNOSIS — Z71.89 OTHER SPECIFIED COUNSELING: ICD-10-CM

## 2021-11-23 LAB
ANION GAP SERPL CALC-SCNC: 8 MMOL/L — SIGNIFICANT CHANGE UP (ref 5–17)
BUN SERPL-MCNC: 54 MG/DL — HIGH (ref 7–23)
CALCIUM SERPL-MCNC: 7.7 MG/DL — LOW (ref 8.5–10.1)
CHLORIDE SERPL-SCNC: 102 MMOL/L — SIGNIFICANT CHANGE UP (ref 96–108)
CO2 SERPL-SCNC: 23 MMOL/L — SIGNIFICANT CHANGE UP (ref 22–31)
CREAT SERPL-MCNC: 4.24 MG/DL — HIGH (ref 0.5–1.3)
GLUCOSE SERPL-MCNC: 90 MG/DL — SIGNIFICANT CHANGE UP (ref 70–99)
HCT VFR BLD CALC: 27.6 % — LOW (ref 34.5–45)
HGB BLD-MCNC: 8.8 G/DL — LOW (ref 11.5–15.5)
MAGNESIUM SERPL-MCNC: 1.9 MG/DL — SIGNIFICANT CHANGE UP (ref 1.6–2.6)
MCHC RBC-ENTMCNC: 28 PG — SIGNIFICANT CHANGE UP (ref 27–34)
MCHC RBC-ENTMCNC: 31.9 GM/DL — LOW (ref 32–36)
MCV RBC AUTO: 87.9 FL — SIGNIFICANT CHANGE UP (ref 80–100)
PHOSPHATE SERPL-MCNC: 3.1 MG/DL — SIGNIFICANT CHANGE UP (ref 2.5–4.5)
PLATELET # BLD AUTO: 289 K/UL — SIGNIFICANT CHANGE UP (ref 150–400)
POTASSIUM SERPL-MCNC: 4.5 MMOL/L — SIGNIFICANT CHANGE UP (ref 3.5–5.3)
POTASSIUM SERPL-SCNC: 4.5 MMOL/L — SIGNIFICANT CHANGE UP (ref 3.5–5.3)
RBC # BLD: 3.14 M/UL — LOW (ref 3.8–5.2)
RBC # FLD: 18.3 % — HIGH (ref 10.3–14.5)
SODIUM SERPL-SCNC: 133 MMOL/L — LOW (ref 135–145)
WBC # BLD: 14.81 K/UL — HIGH (ref 3.8–10.5)
WBC # FLD AUTO: 14.81 K/UL — HIGH (ref 3.8–10.5)

## 2021-11-23 PROCEDURE — 99232 SBSQ HOSP IP/OBS MODERATE 35: CPT

## 2021-11-23 RX ORDER — FUROSEMIDE 40 MG
60 TABLET ORAL DAILY
Refills: 0 | Status: DISCONTINUED | OUTPATIENT
Start: 2021-11-23 | End: 2021-11-24

## 2021-11-23 RX ADMIN — HEPARIN SODIUM 5000 UNIT(S): 5000 INJECTION INTRAVENOUS; SUBCUTANEOUS at 14:42

## 2021-11-23 RX ADMIN — PANTOPRAZOLE SODIUM 40 MILLIGRAM(S): 20 TABLET, DELAYED RELEASE ORAL at 05:06

## 2021-11-23 RX ADMIN — GABAPENTIN 100 MILLIGRAM(S): 400 CAPSULE ORAL at 14:42

## 2021-11-23 RX ADMIN — Medication 1 DROP(S): at 09:40

## 2021-11-23 RX ADMIN — Medication 3 MILLIGRAM(S): at 21:46

## 2021-11-23 RX ADMIN — OXYCODONE AND ACETAMINOPHEN 2 TABLET(S): 5; 325 TABLET ORAL at 09:42

## 2021-11-23 RX ADMIN — OXYCODONE AND ACETAMINOPHEN 2 TABLET(S): 5; 325 TABLET ORAL at 10:20

## 2021-11-23 RX ADMIN — GABAPENTIN 100 MILLIGRAM(S): 400 CAPSULE ORAL at 21:46

## 2021-11-23 RX ADMIN — OXYCODONE AND ACETAMINOPHEN 2 TABLET(S): 5; 325 TABLET ORAL at 01:09

## 2021-11-23 RX ADMIN — AMLODIPINE BESYLATE 10 MILLIGRAM(S): 2.5 TABLET ORAL at 09:39

## 2021-11-23 RX ADMIN — OXYCODONE AND ACETAMINOPHEN 2 TABLET(S): 5; 325 TABLET ORAL at 05:04

## 2021-11-23 RX ADMIN — OXYCODONE AND ACETAMINOPHEN 2 TABLET(S): 5; 325 TABLET ORAL at 20:11

## 2021-11-23 RX ADMIN — GABAPENTIN 100 MILLIGRAM(S): 400 CAPSULE ORAL at 05:04

## 2021-11-23 RX ADMIN — ATOVAQUONE 1500 MILLIGRAM(S): 750 SUSPENSION ORAL at 09:51

## 2021-11-23 RX ADMIN — Medication 60 MILLIGRAM(S): at 09:41

## 2021-11-23 RX ADMIN — HEPARIN SODIUM 5000 UNIT(S): 5000 INJECTION INTRAVENOUS; SUBCUTANEOUS at 21:46

## 2021-11-23 RX ADMIN — OXYCODONE AND ACETAMINOPHEN 2 TABLET(S): 5; 325 TABLET ORAL at 23:00

## 2021-11-23 RX ADMIN — Medication 1 DROP(S): at 21:46

## 2021-11-23 RX ADMIN — Medication 0.1 MILLIGRAM(S): at 09:40

## 2021-11-23 RX ADMIN — OXYCODONE AND ACETAMINOPHEN 2 TABLET(S): 5; 325 TABLET ORAL at 15:39

## 2021-11-23 RX ADMIN — Medication 200 MILLIGRAM(S): at 09:41

## 2021-11-23 RX ADMIN — OXYCODONE AND ACETAMINOPHEN 2 TABLET(S): 5; 325 TABLET ORAL at 05:34

## 2021-11-23 RX ADMIN — OXYCODONE AND ACETAMINOPHEN 2 TABLET(S): 5; 325 TABLET ORAL at 00:39

## 2021-11-23 RX ADMIN — Medication 50 MILLIGRAM(S): at 09:42

## 2021-11-23 RX ADMIN — OXYCODONE AND ACETAMINOPHEN 2 TABLET(S): 5; 325 TABLET ORAL at 14:44

## 2021-11-23 NOTE — PROGRESS NOTE ADULT - ASSESSMENT
40 y/o F with past medical history COPD, depression, epilepsy, GERD, Raynaud's syndrome, heroin abuse, HTN, lupus, RA, sepsis, SLE, smoker, ESRD (initiated on HD 5 weeks ago with tunneled HD catheter placed 10/22/21) presents on 11/15 for fevers over the last 3 days. Her highest temperature was 103.4F at home. She was taking Tylenol over the counter. She feels that her RA has been flaring up because she was been having difficulty moving her arms and legs. Reports headache, cough which is dry with minimal sputum production, soreness at the site of her Tunneled HD catheter, SOB in the mornings since starting HD 5 weeks ago, lower extremity swelling. Denies congestion, runny nose, sore throat, chest pain, palpitations, abdominal pain, N/V, diarrhea/constipation, burning on urination, urinary urgency/frequency. Admission blood cultures 4/4 bottles are growing MRSA and patient has fullness and pain adjacent to dialysis catheter site. Poor historian, history per medical record.     1. Patient admitted with MRSA sepsis probably due to line infection versus IVDU versus endocarditis; also history of ESRD on hemodialysis  - follow up cultures   - serial cbc and monitor temperature   - reviewed prior medical records to evaluate for resistant or atypical pathogens   - nephrology evaluation  - will optimize antibiotics to daptomycin q 48 hours, day #8  - tolerating antibiotics without rashes or side effects   - repeat blood cultures are growing persistent Staph; however patient needs dialysis  - will have dialysis in am; then shiley will be removed  - will then repeat blood cultures on 11/24, there are repeat blood cultures from 11/22, they may still be positive, however the shiley was still in place  - will check echocardiogram---noted no vegetations seen  - continue isolation  - d/w nephrology  2. other issues: per medicine

## 2021-11-23 NOTE — PROGRESS NOTE ADULT - ASSESSMENT
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    NATALIA on CKD with HD dependence -    now with MRSA bacteremia    HD Fri/sat and 11/22 , patient is planning on AMA for holiday   HD tomorrow and catheter removal thereafter   re-eninsertion of TDC once cleared by ID     Anemia   s/p PRBC with HD   TAMARA w HD     HTN  -Oral meds    ANCA Vasculitis  -Still on prednisone   rheum followup     d/c with Rn staff, HD staff, Dr Pena     resume HD [facility] once cleared

## 2021-11-24 VITALS — DIASTOLIC BLOOD PRESSURE: 96 MMHG | SYSTOLIC BLOOD PRESSURE: 145 MMHG | HEART RATE: 85 BPM | RESPIRATION RATE: 18 BRPM

## 2021-11-24 LAB
ALBUMIN SERPL ELPH-MCNC: 2.6 G/DL — LOW (ref 3.3–5)
ANION GAP SERPL CALC-SCNC: 12 MMOL/L — SIGNIFICANT CHANGE UP (ref 5–17)
BUN SERPL-MCNC: 90 MG/DL — HIGH (ref 7–23)
CALCIUM SERPL-MCNC: 7.5 MG/DL — LOW (ref 8.5–10.1)
CHLORIDE SERPL-SCNC: 101 MMOL/L — SIGNIFICANT CHANGE UP (ref 96–108)
CO2 SERPL-SCNC: 20 MMOL/L — LOW (ref 22–31)
CREAT SERPL-MCNC: 5.64 MG/DL — HIGH (ref 0.5–1.3)
GLUCOSE SERPL-MCNC: 95 MG/DL — SIGNIFICANT CHANGE UP (ref 70–99)
HCT VFR BLD CALC: 28.5 % — LOW (ref 34.5–45)
HGB BLD-MCNC: 8.9 G/DL — LOW (ref 11.5–15.5)
MCHC RBC-ENTMCNC: 28.3 PG — SIGNIFICANT CHANGE UP (ref 27–34)
MCHC RBC-ENTMCNC: 31.2 GM/DL — LOW (ref 32–36)
MCV RBC AUTO: 90.5 FL — SIGNIFICANT CHANGE UP (ref 80–100)
PHOSPHATE SERPL-MCNC: 3.8 MG/DL — SIGNIFICANT CHANGE UP (ref 2.5–4.5)
PLATELET # BLD AUTO: 350 K/UL — SIGNIFICANT CHANGE UP (ref 150–400)
POTASSIUM SERPL-MCNC: 4.5 MMOL/L — SIGNIFICANT CHANGE UP (ref 3.5–5.3)
POTASSIUM SERPL-SCNC: 4.5 MMOL/L — SIGNIFICANT CHANGE UP (ref 3.5–5.3)
RBC # BLD: 3.15 M/UL — LOW (ref 3.8–5.2)
RBC # FLD: 19 % — HIGH (ref 10.3–14.5)
SODIUM SERPL-SCNC: 133 MMOL/L — LOW (ref 135–145)
WBC # BLD: 16.39 K/UL — HIGH (ref 3.8–10.5)
WBC # FLD AUTO: 16.39 K/UL — HIGH (ref 3.8–10.5)

## 2021-11-24 PROCEDURE — 99232 SBSQ HOSP IP/OBS MODERATE 35: CPT

## 2021-11-24 RX ADMIN — OXYCODONE AND ACETAMINOPHEN 2 TABLET(S): 5; 325 TABLET ORAL at 08:21

## 2021-11-24 RX ADMIN — GABAPENTIN 100 MILLIGRAM(S): 400 CAPSULE ORAL at 13:31

## 2021-11-24 RX ADMIN — Medication 50 MILLIGRAM(S): at 13:30

## 2021-11-24 RX ADMIN — GABAPENTIN 100 MILLIGRAM(S): 400 CAPSULE ORAL at 06:06

## 2021-11-24 RX ADMIN — AMLODIPINE BESYLATE 10 MILLIGRAM(S): 2.5 TABLET ORAL at 13:31

## 2021-11-24 RX ADMIN — ERYTHROPOIETIN 10000 UNIT(S): 10000 INJECTION, SOLUTION INTRAVENOUS; SUBCUTANEOUS at 11:25

## 2021-11-24 RX ADMIN — PANTOPRAZOLE SODIUM 40 MILLIGRAM(S): 20 TABLET, DELAYED RELEASE ORAL at 06:06

## 2021-11-24 RX ADMIN — DAPTOMYCIN 118 MILLIGRAM(S): 500 INJECTION, POWDER, LYOPHILIZED, FOR SOLUTION INTRAVENOUS at 13:46

## 2021-11-24 RX ADMIN — OXYCODONE AND ACETAMINOPHEN 2 TABLET(S): 5; 325 TABLET ORAL at 00:17

## 2021-11-24 RX ADMIN — Medication 1 DROP(S): at 13:24

## 2021-11-24 RX ADMIN — OXYCODONE AND ACETAMINOPHEN 2 TABLET(S): 5; 325 TABLET ORAL at 13:31

## 2021-11-24 RX ADMIN — Medication 0.1 MILLIGRAM(S): at 13:24

## 2021-11-24 RX ADMIN — ATOVAQUONE 1500 MILLIGRAM(S): 750 SUSPENSION ORAL at 13:25

## 2021-11-24 RX ADMIN — OXYCODONE AND ACETAMINOPHEN 2 TABLET(S): 5; 325 TABLET ORAL at 09:08

## 2021-11-24 RX ADMIN — OXYCODONE AND ACETAMINOPHEN 2 TABLET(S): 5; 325 TABLET ORAL at 15:33

## 2021-11-24 RX ADMIN — OXYCODONE AND ACETAMINOPHEN 2 TABLET(S): 5; 325 TABLET ORAL at 04:26

## 2021-11-24 RX ADMIN — Medication 60 MILLIGRAM(S): at 13:31

## 2021-11-24 RX ADMIN — Medication 200 MILLIGRAM(S): at 13:25

## 2021-11-24 RX ADMIN — CHLORHEXIDINE GLUCONATE 1 APPLICATION(S): 213 SOLUTION TOPICAL at 06:06

## 2021-11-24 NOTE — PROGRESS NOTE ADULT - NUTRITIONAL ASSESSMENT
This patient has been assessed with a concern for Malnutrition and has been determined to have a diagnosis/diagnoses of Severe protein-calorie malnutrition.    This patient is being managed with:   Diet Regular-  Entered: Nov 19 2021  2:25PM    Diet NPO after Midnight-     NPO Start Date: 18-Nov-2021   NPO Start Time: 23:59  Entered: Nov 18 2021 12:41PM    
This patient has been assessed with a concern for Malnutrition and has been determined to have a diagnosis/diagnoses of Severe protein-calorie malnutrition.    This patient is being managed with:   Diet Renal Restrictions-  For patients receiving Renal Replacement - No Protein Restr No Conc K No Conc Phos Low Sodium  Entered: Nov 15 2021  8:47PM    
This patient has been assessed with a concern for Malnutrition and has been determined to have a diagnosis/diagnoses of Severe protein-calorie malnutrition.    This patient is being managed with:   Diet NPO after Midnight-     NPO Start Date: 18-Nov-2021   NPO Start Time: 23:59  Entered: Nov 18 2021 12:41PM    Diet Renal Restrictions-  For patients receiving Renal Replacement - No Protein Restr No Conc K No Conc Phos Low Sodium  Entered: Nov 15 2021  8:47PM    
This patient has been assessed with a concern for Malnutrition and has been determined to have a diagnosis/diagnoses of Severe protein-calorie malnutrition.    This patient is being managed with:   Diet Regular-  Entered: Nov 19 2021  2:25PM    Diet NPO after Midnight-     NPO Start Date: 18-Nov-2021   NPO Start Time: 23:59  Entered: Nov 18 2021 12:41PM    
This patient has been assessed with a concern for Malnutrition and has been determined to have a diagnosis/diagnoses of Severe protein-calorie malnutrition.    This patient is being managed with:   Diet NPO after Midnight-     NPO Start Date: 18-Nov-2021   NPO Start Time: 23:59  Entered: Nov 18 2021 12:41PM    Diet Renal Restrictions-  For patients receiving Renal Replacement - No Protein Restr No Conc K No Conc Phos Low Sodium  Entered: Nov 15 2021  8:47PM    
This patient has been assessed with a concern for Malnutrition and has been determined to have a diagnosis/diagnoses of Severe protein-calorie malnutrition.    This patient is being managed with:   Diet NPO after Midnight-     NPO Start Date: 18-Nov-2021   NPO Start Time: 23:59  Entered: Nov 18 2021 12:41PM    Diet Renal Restrictions-  For patients receiving Renal Replacement - No Protein Restr No Conc K No Conc Phos Low Sodium  Entered: Nov 15 2021  8:47PM    
This patient has been assessed with a concern for Malnutrition and has been determined to have a diagnosis/diagnoses of Severe protein-calorie malnutrition.    This patient is being managed with:   Diet Renal Restrictions-  For patients receiving Renal Replacement - No Protein Restr No Conc K No Conc Phos Low Sodium  Entered: Nov 15 2021  8:47PM    
This patient has been assessed with a concern for Malnutrition and has been determined to have a diagnosis/diagnoses of Severe protein-calorie malnutrition.    This patient is being managed with:   Diet Regular-  Entered: Nov 19 2021  2:25PM    Diet NPO after Midnight-     NPO Start Date: 18-Nov-2021   NPO Start Time: 23:59  Entered: Nov 18 2021 12:41PM    
This patient has been assessed with a concern for Malnutrition and has been determined to have a diagnosis/diagnoses of Severe protein-calorie malnutrition.    This patient is being managed with:   Diet Renal Restrictions-  For patients receiving Renal Replacement - No Protein Restr No Conc K No Conc Phos Low Sodium  Entered: Nov 15 2021  8:47PM    
This patient has been assessed with a concern for Malnutrition and has been determined to have a diagnosis/diagnoses of Severe protein-calorie malnutrition.    This patient is being managed with:   Diet Regular-  Entered: Nov 19 2021  2:25PM    Diet NPO after Midnight-     NPO Start Date: 18-Nov-2021   NPO Start Time: 23:59  Entered: Nov 18 2021 12:41PM    
This patient has been assessed with a concern for Malnutrition and has been determined to have a diagnosis/diagnoses of Severe protein-calorie malnutrition.    This patient is being managed with:   Diet Renal Restrictions-  For patients receiving Renal Replacement - No Protein Restr No Conc K No Conc Phos Low Sodium  Entered: Nov 15 2021  8:47PM

## 2021-11-24 NOTE — BRIEF OPERATIVE NOTE - OPERATION/FINDINGS
Successful removal of right IJ nontunneled dialysis catheter. Hemostasis achieved with direct pressure. Dry sterile dressing was placed. Instructed pt to not lie down for the next 2-3 hours for hemostasis.
14F 15cm CORI Reddy, tip in SVC/RA
Dr. Shaquille Soni requested removal of right anterior chest tunneled dialysis catheter due to positive blood cultures, also recommended by ID. Pt seen at bedside, dressing was removed. Anchoring suture was cut. Line was removed with gentle traction. Pressure was applied to the right IJ/line tract for hemostasis. A dry sterile dressing was placed. Nurses informed to keep HOB elevated > 30 degrees for the next 2-3 hours.

## 2021-11-24 NOTE — BRIEF OPERATIVE NOTE - NSICDXBRIEFPROCEDURE_GEN_ALL_CORE_FT
PROCEDURES:  Removal of tunneled dialysis catheter 16-Nov-2021 14:11:58  Bryan Covington  
PROCEDURES:  Removal, catheter, dialysis, temporary 24-Nov-2021 13:58:01  Bryan Covington

## 2021-11-24 NOTE — PROGRESS NOTE ADULT - REASON FOR ADMISSION
Fevers

## 2021-11-24 NOTE — PROGRESS NOTE ADULT - PROVIDER SPECIALTY LIST ADULT
Hospitalist
Nephrology
Hospitalist
Nephrology
Hospitalist
Hospitalist
Infectious Disease
Nephrology
Hospitalist
Hospitalist
Infectious Disease
Nephrology
Infectious Disease

## 2021-11-24 NOTE — PROGRESS NOTE ADULT - SUBJECTIVE AND OBJECTIVE BOX
Date of service: 11-17-21 @ 13:48      Patient lying in bed; had tessio removed      ROS unable to obtain secondary to patient medical condition     MEDICATIONS  (STANDING):  amLODIPine   Tablet 10 milliGRAM(s) Oral daily  artificial  tears Solution 1 Drop(s) Both EYES two times a day  atovaquone  Suspension 1500 milliGRAM(s) Oral daily  calcium acetate 1334 milliGRAM(s) Oral three times a day with meals  cloNIDine 0.1 milliGRAM(s) Oral daily  DAPTOmycin IVPB 450 milliGRAM(s) IV Intermittent every 48 hours  epoetin amee-epbx (RETACRIT) Injectable 24728 Unit(s) IV Push <User Schedule>  gabapentin 100 milliGRAM(s) Oral three times a day  heparin   Injectable 5000 Unit(s) SubCutaneous every 8 hours  hydroxychloroquine 200 milliGRAM(s) Oral daily  influenza   Vaccine 0.5 milliLiter(s) IntraMuscular once  pantoprazole    Tablet 40 milliGRAM(s) Oral before breakfast  predniSONE   Tablet 50 milliGRAM(s) Oral daily  senna 2 Tablet(s) Oral at bedtime    MEDICATIONS  (PRN):  acetaminophen     Tablet .. 650 milliGRAM(s) Oral every 6 hours PRN Mild Pain (1 - 3)  aluminum hydroxide/magnesium hydroxide/simethicone Suspension 30 milliLiter(s) Oral every 4 hours PRN Dyspepsia  loperamide 2 milliGRAM(s) Oral three times a day PRN Diarrhea  melatonin 3 milliGRAM(s) Oral at bedtime PRN Insomnia  ondansetron Injectable 4 milliGRAM(s) IV Push every 8 hours PRN Nausea and/or Vomiting  oxycodone    5 mG/acetaminophen 325 mG 2 Tablet(s) Oral every 6 hours PRN Severe Pain (7 - 10)      Vital Signs Last 24 Hrs  T(C): 36.2 (17 Nov 2021 10:34), Max: 37.3 (16 Nov 2021 17:11)  T(F): 97.2 (17 Nov 2021 10:34), Max: 99.1 (16 Nov 2021 17:11)  HR: 93 (17 Nov 2021 10:34) (93 - 116)  BP: 143/94 (17 Nov 2021 10:34) (134/88 - 154/95)  BP(mean): --  RR: 18 (17 Nov 2021 10:34) (18 - 18)  SpO2: 98% (17 Nov 2021 10:34) (98% - 100%)        Physical Exam:          Constitutional: frail looking  HEENT: NC/AT, EOMI, PERRLA, conjunctivae clear; ears and nose atraumatic; pharynx clear  Neck: supple; thyroid not palpable  Back: no tenderness  Respiratory: respiratory effort normal; clear to auscultation  Cardiovascular: S1S2 regular, no murmurs  Abdomen: soft, not tender, not distended, positive BS; no liver or spleen organomegaly  Genitourinary: no suprapubic tenderness  Musculoskeletal: no muscle tenderness, no joint swelling or tenderness  Neurological/ Psychiatric: AxOx3, judgement and insight normal;  moving all extremities  Skin: no rashes; no palpable lesions; too numerous to count track marks; right upper chest dressing over removed tessio site    Labs: all available labs reviewed                     Labs:                        7.6    4.98  )-----------( 161      ( 17 Nov 2021 11:35 )             24.8     11-17    137  |  101  |  57<H>  ----------------------------<  149<H>  3.0<L>   |  25  |  5.54<H>    Ca    8.1<L>      17 Nov 2021 11:35    TPro  7.0  /  Alb  2.8<L>  /  TBili  0.3  /  DBili  x   /  AST  15  /  ALT  15  /  AlkPhos  82  11-15           Cultures:       Culture - Blood (collected 11-15-21 @ 14:48)  Source: .Blood None  Gram Stain (11-16-21 @ 12:37):    Growth in aerobic bottle: Gram Positive Cocci in Clusters    Growth in anaerobic bottle: Gram Positive Cocci in Clusters  Preliminary Report (11-17-21 @ 12:29):    Growth in aerobic and anaerobic bottles: Staphylococcus aureus    See previous culture [id number]    Culture - Blood (collected 11-15-21 @ 14:48)  Source: .Blood None  Gram Stain (11-16-21 @ 12:38):    Growth in aerobic bottle: Gram Positive Cocci in Clusters    Growth in anaerobic bottle: Gram Positive Cocci in Clusters  Preliminary Report (11-17-21 @ 10:09):    Growth in aerobic and anaerobic bottles: Staphylococcus aureus    Susceptibility to follow.    ***Blood Panel PCR results on this specimen are available    approximately 3 hours after the Gram stain result.***    Gram stain, PCR, and/or culture results maynot always    correspond due to difference in methodologies.    ************************************************************    This PCR assay was performed by multiplex PCR. This    Assay tests for 66 bacterial and resistance gene targets.    Please refer to the [facility] [employer] test directory    at [email or web address] for details.  Organism: Blood Culture PCR (11-16-21 @ 12:41)  Organism: Blood Culture PCR (11-16-21 @ 12:41)      -  Methicillin resistant Staphylococcus aureus (MRSA): Detec      Method Type: PCR      C-Reactive Protein, Serum: 385 mg/L (11-15-21 @ 21:12)                    Culture Results:   Growth in aerobic bottle: Gram Positive Cocci in Clusters  Growth in anaerobic bottle: Gram Positive Cocci in Clusters  ***Blood Panel PCR results on this specimen are available  approximately 3 hours after the Gram stain result.***  Gram stain, PCR, and/or culture results may not always  correspond due to difference in methodologies.  ************************************************************  This PCR assay was performed by multiplex PCR. This  Assay tests for 66 bacterial and resistance gene targets.  Please refer to the [facility] [employer] test directory  at [email or web address] for details. (11-15 @ 14:48)  Culture Results:   Growth in aerobic bottle: Gram Positive Cocci in Clusters  Growth in anaerobic bottle: Gram Positive Cocci in Clusters (11-15 @ 14:48)          < from: TTE Echo Complete w/o Contrast w/ Doppler (11.16.21 @ 18:43) >     Impression     Summary     The mitral valve leaflets appear normal; there is no evidence of stenosis,   fluttering or prolapse.   Mild (1+) mitral regurgitation is present.   Normal aortic valve structure and function.   The tricuspid valve leaflets are thin and pliable; valve motion is normal.   Mild (1+) tricuspid valve regurgitation is present.   Normal appearing pulmonic valve structure.   Trace pulmonic valvular regurgitation is present.   The left atrium appears normal.   Estimated left ventricular ejection fraction is 50-55 %.   Mild concentric left ventricular hypertrophy is present.   Pleural effusion cannot be ruled out.      < end of copied text >            Radiology: all available radiological tests reviewed    Advanced directives addressed: full resuscitation
40 y/o F with PMH aponia, bacteremia, COPD, depression, epilepsy, GERD, Raynaud's syndrome, heroin abuse, HTN, lupus, RA, sepsis, SLE, smoker, ESRD (initiated on HD 5 weeks ago with tunneled HD catheter placed 10/22/21) presents for fevers over the last 3 days. Her highest temperature was 103.4F at home. She was taking Tylenol over the counter. She feels that her RA has been flaring up because she was been having difficulty moving her arms and legs. Reports headache, cough which is dry with minimal sputum production, soreness at the site of her Tunneled HD catheter, SOB in the mornings since starting HD 5 weeks ago, lower extremity swelling. Denies congestion, runny nose, sore throat, chest pain, palpitations, abdominal pain, N/V, diarrhea/constipation, burning on urination, urinary urgency/frequency. Of note, pt had a prior urine culture 10/11/2021 showed >100,000 of Enterobacter cloacae and a wound culture from 7/13/2020 which showed MRSA. She was supposed to go to HD today and called Dr. Pena's office who instructed her to come to the ER. Of note, pt has a history of bacteremia and had a tracheostomy tube placed and PEG.   ER course: -109. Labs: Hb 9.2, , BUN 67, Cr 5.92, Albumin 2.8, GFR 8. UA: total protein 510, protein/Cr ratio 10.6, protein 500, large blood, RBC 25-50, moderate bacteria, glucose 100. COVID negative. RSV negative. Influenza A and B negative.       11/17/21: Patient seen and examined. Catheter removed yesterday. Discussed with Dr Pena.  11/18/21: Possible shiley catheter placement tomorrow      Vital Signs Last 24 Hrs  T(C): 37 (18 Nov 2021 15:31), Max: 37 (18 Nov 2021 15:31)  T(F): 98.6 (18 Nov 2021 15:31), Max: 98.6 (18 Nov 2021 15:31)  HR: 94 (18 Nov 2021 15:31) (92 - 100)  BP: 147/94 (18 Nov 2021 15:31) (136/90 - 152/97)  BP(mean): --  RR: 18 (18 Nov 2021 15:31) (16 - 18)  SpO2: 98% (18 Nov 2021 15:31) (94% - 98%)      Physical Exam:       General: Awake and alert, cooperative with exam. No acute distress.   Skin: Warm, dry, and pink. Pale complexion.   Eyes: Pupils equal and reactive to light. Extraocular eye movements intact. No conjunctival injection, discharge, or scleral icterus.   HEENT: Atraumatic, normocephalic. Moist mucus membranes.   Chest wall: Tunneled HD catheter without surrounding erythema or drainage.   Cardiology: Normal S1, S2. Systolic ejection murmur. Regular rate and rhythm.   Respiratory: Decreased breath sounds at the bases bilaterally. No wheezes, rales, or rhonchi. Normal chest expansion.   Gastrointestinal: Positive bowel sounds. Soft, non-tender, non-distended. No guarding, rigidity, or rebound tenderness. No hepatosplenomegaly.   Buttocks: Sacral decubitus ulcer with eschar, no surrounding erythema or purulent drainage.   Musculoskeletal: Contracted, decreased ROM of upper extremities.   Extremities: 2+ pitting edema bilaterally. Dorsalis pedis pulses 2+ bilaterally. Wound on left lower extremity approximately 2 x 2 cm with purulent drainage, no surrounding erythema.   Neurological: A+Ox3 (person, place, and time). Cranial nerves 2-12 intact. Normal speech. No facial droop. No focal neurological deficits. 5/5 motor strength in all extremities.   Psychiatric: Normal affect. Normal mood.                              7.6    5.89  )-----------( 199      ( 18 Nov 2021 12:00 )             24.1     18 Nov 2021 12:00    139    |  103    |  82     ----------------------------<  91     3.4     |  26     |  6.55     Ca    8.1        18 Nov 2021 12:00  Phos  3.1       18 Nov 2021 12:00  Mg     2.2       18 Nov 2021 12:00            MEDICATIONS  (STANDING):  amLODIPine   Tablet 10 milliGRAM(s) Oral daily  artificial  tears Solution 1 Drop(s) Both EYES two times a day  atovaquone  Suspension 1500 milliGRAM(s) Oral daily  calcium acetate 1334 milliGRAM(s) Oral three times a day with meals  cloNIDine 0.1 milliGRAM(s) Oral daily  DAPTOmycin IVPB 450 milliGRAM(s) IV Intermittent every 48 hours  epoetin amee-epbx (RETACRIT) Injectable 14236 Unit(s) IV Push <User Schedule>  gabapentin 100 milliGRAM(s) Oral three times a day  heparin   Injectable 5000 Unit(s) SubCutaneous every 8 hours  hydroxychloroquine 200 milliGRAM(s) Oral daily  influenza   Vaccine 0.5 milliLiter(s) IntraMuscular once  pantoprazole    Tablet 40 milliGRAM(s) Oral before breakfast  predniSONE   Tablet 50 milliGRAM(s) Oral daily  senna 2 Tablet(s) Oral at bedtime    MEDICATIONS  (PRN):  acetaminophen     Tablet .. 650 milliGRAM(s) Oral every 6 hours PRN Mild Pain (1 - 3)  aluminum hydroxide/magnesium hydroxide/simethicone Suspension 30 milliLiter(s) Oral every 4 hours PRN Dyspepsia  loperamide 2 milliGRAM(s) Oral three times a day PRN Diarrhea  melatonin 3 milliGRAM(s) Oral at bedtime PRN Insomnia  ondansetron Injectable 4 milliGRAM(s) IV Push every 8 hours PRN Nausea and/or Vomiting  oxycodone    5 mG/acetaminophen 325 mG 2 Tablet(s) Oral every 6 hours PRN Severe Pain (7 - 10)                Assessment and Plan:   Assessment:  · Assessment	  40 y/o F presents with fever    1. MRSA sepsis  Suspected line sepsis  Dr Harrington consult appreciated  2D echo No endocarditis  Continue IV daptomycin as per Dr Harrington  Repeat blood cultures today    2. ESRD on HD   - Nephrology consult - Dr. Pena  -HD as per renal   -[facility] tomorrow by IR      3. Hypokalemia-replaced     4. Sacral decubitus ulcer, wounds on the left lower extremities   - Wound care consult for lower extremity wounds      5. History of aponia, bacteremia, COPD, depression, epilepsy, GERD, Raynaud's syndrome, heroin abuse, HTN, lupus, RA, sepsis, SLE, smoker, ESRD (initiated on HD 5 weeks ago with tunneled HD catheter placed 10/22/21)  - c/w home medications   - Counseled on smoking cessation, declined Nicotine patch     6. Anemia-chr due to ESRD  Follow    DVT ppx: Heparin 5,000 units Q8H subcutaneous daily (hold for PLT <50,000)  Code status: Full code (pt agrees to chest compressions and intubation if required)     
40 y/o F with PMH aponia, bacteremia, COPD, depression, epilepsy, GERD, Raynaud's syndrome, heroin abuse, HTN, lupus, RA, sepsis, SLE, smoker, ESRD (initiated on HD 5 weeks ago with tunneled HD catheter placed 10/22/21) presents for fevers over the last 3 days. Her highest temperature was 103.4F at home. She was taking Tylenol over the counter. She feels that her RA has been flaring up because she was been having difficulty moving her arms and legs. Reports headache, cough which is dry with minimal sputum production, soreness at the site of her Tunneled HD catheter, SOB in the mornings since starting HD 5 weeks ago, lower extremity swelling. Denies congestion, runny nose, sore throat, chest pain, palpitations, abdominal pain, N/V, diarrhea/constipation, burning on urination, urinary urgency/frequency. Of note, pt had a prior urine culture 10/11/2021 showed >100,000 of Enterobacter cloacae and a wound culture from 7/13/2020 which showed MRSA. She was supposed to go to HD today and called Dr. Pena's office who instructed her to come to the ER. Of note, pt has a history of bacteremia and had a tracheostomy tube placed and PEG.   ER course: -109. Labs: Hb 9.2, , BUN 67, Cr 5.92, Albumin 2.8, GFR 8. UA: total protein 510, protein/Cr ratio 10.6, protein 500, large blood, RBC 25-50, moderate bacteria, glucose 100. COVID negative. RSV negative. Influenza A and B negative.       11/17/21: Patient seen and examined. Catheter removed yesterday. Discussed with Dr Pena.  11/18/21: Possible shiley catheter placement tomorrow  11/19/21: Patient seen and examined. No new complaints.      Vital Signs Last 24 Hrs  T(C): 37 (19 Nov 2021 07:57), Max: 37 (18 Nov 2021 15:31)  T(F): 98.6 (19 Nov 2021 07:57), Max: 98.6 (18 Nov 2021 15:31)  HR: 93 (19 Nov 2021 11:11) (92 - 106)  BP: 149/98 (19 Nov 2021 11:11) (147/94 - 159/101)  BP(mean): --  RR: 18 (19 Nov 2021 07:57) (18 - 18)  SpO2: 99% (19 Nov 2021 07:57) (97% - 99%)      Physical Exam:       General: Awake and alert, cooperative with exam. No acute distress.   Skin: Warm, dry, and pink. Pale complexion.   Eyes: Pupils equal and reactive to light. Extraocular eye movements intact. No conjunctival injection, discharge, or scleral icterus.   HEENT: Atraumatic, normocephalic. Moist mucus membranes.   Chest wall: Tunneled HD catheter without surrounding erythema or drainage.   Cardiology: Normal S1, S2. Systolic ejection murmur. Regular rate and rhythm.   Respiratory: Decreased breath sounds at the bases bilaterally. No wheezes, rales, or rhonchi. Normal chest expansion.   Gastrointestinal: Positive bowel sounds. Soft, non-tender, non-distended. No guarding, rigidity, or rebound tenderness. No hepatosplenomegaly.   Buttocks: Sacral decubitus ulcer with eschar, no surrounding erythema or purulent drainage.   Musculoskeletal: Contracted, decreased ROM of upper extremities.   Extremities: 2+ pitting edema bilaterally. Dorsalis pedis pulses 2+ bilaterally. Wound on left lower extremity approximately 2 x 2 cm with purulent drainage, no surrounding erythema.   Neurological: A+Ox3 (person, place, and time). Cranial nerves 2-12 intact. Normal speech. No facial droop. No focal neurological deficits. 5/5 motor strength in all extremities.   Psychiatric: Normal affect. Normal mood.                                          7.2    6.33  )-----------( 210      ( 19 Nov 2021 09:30 )             23.9     19 Nov 2021 09:30    137    |  104    |  115    ----------------------------<  88     4.2     |  23     |  7.35     Ca    7.9        19 Nov 2021 09:30  Phos  3.1       18 Nov 2021 12:00  Mg     2.2       18 Nov 2021 12:00                    MEDICATIONS  (STANDING):  amLODIPine   Tablet 10 milliGRAM(s) Oral daily  artificial  tears Solution 1 Drop(s) Both EYES two times a day  atovaquone  Suspension 1500 milliGRAM(s) Oral daily  calcium acetate 1334 milliGRAM(s) Oral three times a day with meals  cloNIDine 0.1 milliGRAM(s) Oral daily  DAPTOmycin IVPB 450 milliGRAM(s) IV Intermittent every 48 hours  epoetin amee-epbx (RETACRIT) Injectable 13248 Unit(s) IV Push <User Schedule>  gabapentin 100 milliGRAM(s) Oral three times a day  heparin   Injectable 5000 Unit(s) SubCutaneous every 8 hours  hydroxychloroquine 200 milliGRAM(s) Oral daily  influenza   Vaccine 0.5 milliLiter(s) IntraMuscular once  pantoprazole    Tablet 40 milliGRAM(s) Oral before breakfast  predniSONE   Tablet 50 milliGRAM(s) Oral daily  senna 2 Tablet(s) Oral at bedtime    MEDICATIONS  (PRN):  acetaminophen     Tablet .. 650 milliGRAM(s) Oral every 6 hours PRN Mild Pain (1 - 3)  aluminum hydroxide/magnesium hydroxide/simethicone Suspension 30 milliLiter(s) Oral every 4 hours PRN Dyspepsia  loperamide 2 milliGRAM(s) Oral three times a day PRN Diarrhea  melatonin 3 milliGRAM(s) Oral at bedtime PRN Insomnia  ondansetron Injectable 4 milliGRAM(s) IV Push every 8 hours PRN Nausea and/or Vomiting  oxycodone    5 mG/acetaminophen 325 mG 2 Tablet(s) Oral every 6 hours PRN Severe Pain (7 - 10)                Assessment and Plan:   Assessment:  · Assessment	  40 y/o F presents with fever    1. MRSA sepsis  Suspected line sepsis  Dr Harrington consult appreciated  2D echo No endocarditis  Continue IV daptomycin as per Dr Harrington  Repeat blood cultures still positive    2. ESRD on HD   - Nephrology follow up - Dr. Pena appreciated  -HD as per renal   -Shiley today by IR    3. Hypokalemia-replaced     4. Sacral decubitus ulcer, wounds on the left lower extremities   - Wound care consult for lower extremity wounds      5. History of aponia, bacteremia, COPD, depression, epilepsy, GERD, Raynaud's syndrome, heroin abuse, HTN, lupus, RA, sepsis, SLE, smoker, ESRD (initiated on HD 5 weeks ago with tunneled HD catheter placed 10/22/21)  - c/w home medications   - Counseled on smoking cessation, declined Nicotine patch     6. Anemia-chr due to ESRD  Transfuse one unit PRBC today with HD    DVT ppx: Heparin 5,000 units Q8H subcutaneous daily (hold for PLT <50,000)  Code status: Full code (pt agrees to chest compressions and intubation if required)     
42 y/o F with PMH aponia, bacteremia, COPD, depression, epilepsy, GERD, Raynaud's syndrome, heroin abuse, HTN, lupus, RA, sepsis, SLE, smoker, ESRD (initiated on HD 5 weeks ago with tunneled HD catheter placed 10/22/21) presents for fevers over the last 3 days. Her highest temperature was 103.4F at home. She was taking Tylenol over the counter. She feels that her RA has been flaring up because she was been having difficulty moving her arms and legs. Reports headache, cough which is dry with minimal sputum production, soreness at the site of her Tunneled HD catheter, SOB in the mornings since starting HD 5 weeks ago, lower extremity swelling. Denies congestion, runny nose, sore throat, chest pain, palpitations, abdominal pain, N/V, diarrhea/constipation, burning on urination, urinary urgency/frequency. Of note, pt had a prior urine culture 10/11/2021 showed >100,000 of Enterobacter cloacae and a wound culture from 7/13/2020 which showed MRSA. She was supposed to go to HD today and called Dr. Pena's office who instructed her to come to the ER. Of note, pt has a history of bacteremia and had a tracheostomy tube placed and PEG.   ER course: -109. Labs: Hb 9.2, , BUN 67, Cr 5.92, Albumin 2.8, GFR 8. UA: total protein 510, protein/Cr ratio 10.6, protein 500, large blood, RBC 25-50, moderate bacteria, glucose 100. COVID negative. RSV negative. Influenza A and B negative.       11/17/21: Patient seen and examined. Catheter removed yesterday. Discussed with Dr Pena      Vital Signs Last 24 Hrs  T(C): 36.2 (17 Nov 2021 10:34), Max: 37.3 (16 Nov 2021 17:11)  T(F): 97.2 (17 Nov 2021 10:34), Max: 99.1 (16 Nov 2021 17:11)  HR: 93 (17 Nov 2021 10:34) (93 - 116)  BP: 143/94 (17 Nov 2021 10:34) (134/88 - 154/95)  BP(mean): --  RR: 18 (17 Nov 2021 10:34) (18 - 18)  SpO2: 98% (17 Nov 2021 10:34) (98% - 100%)      Physical Exam:       General: Awake and alert, cooperative with exam. No acute distress.   Skin: Warm, dry, and pink. Pale complexion.   Eyes: Pupils equal and reactive to light. Extraocular eye movements intact. No conjunctival injection, discharge, or scleral icterus.   HEENT: Atraumatic, normocephalic. Moist mucus membranes.   Chest wall: Tunneled HD catheter without surrounding erythema or drainage.   Cardiology: Normal S1, S2. Systolic ejection murmur. Regular rate and rhythm.   Respiratory: Decreased breath sounds at the bases bilaterally. No wheezes, rales, or rhonchi. Normal chest expansion.   Gastrointestinal: Positive bowel sounds. Soft, non-tender, non-distended. No guarding, rigidity, or rebound tenderness. No hepatosplenomegaly.   Buttocks: Sacral decubitus ulcer with eschar, no surrounding erythema or purulent drainage.   Musculoskeletal: Contracted, decreased ROM of upper extremities.   Extremities: 2+ pitting edema bilaterally. Dorsalis pedis pulses 2+ bilaterally. Wound on left lower extremity approximately 2 x 2 cm with purulent drainage, no surrounding erythema.   Neurological: A+Ox3 (person, place, and time). Cranial nerves 2-12 intact. Normal speech. No facial droop. No focal neurological deficits. 5/5 motor strength in all extremities.   Psychiatric: Normal affect. Normal mood.                               7.6    4.98  )-----------( 161      ( 17 Nov 2021 11:35 )             24.8     17 Nov 2021 11:35    137    |  101    |  57     ----------------------------<  149    3.0     |  25     |  5.54     Ca    8.1        17 Nov 2021 11:35    TPro  7.0    /  Alb  2.8    /  TBili  0.3    /  DBili  x      /  AST  15     /  ALT  15     /  AlkPhos  82     15 Nov 2021 14:48    LIVER FUNCTIONS - ( 15 Nov 2021 14:48 )  Alb: 2.8 g/dL / Pro: 7.0 gm/dL / ALK PHOS: 82 U/L / ALT: 15 U/L / AST: 15 U/L / GGT: x           PT/INR - ( 15 Nov 2021 14:48 )   PT: 13.1 sec;   INR: 1.14 ratio         PTT - ( 15 Nov 2021 14:48 )  PTT:29.1 sec  CAPILLARY BLOOD GLUCOSE            MEDICATIONS  (STANDING):  amLODIPine   Tablet 10 milliGRAM(s) Oral daily  artificial  tears Solution 1 Drop(s) Both EYES two times a day  atovaquone  Suspension 1500 milliGRAM(s) Oral daily  calcium acetate 1334 milliGRAM(s) Oral three times a day with meals  cloNIDine 0.1 milliGRAM(s) Oral daily  DAPTOmycin IVPB 450 milliGRAM(s) IV Intermittent every 48 hours  epoetin amee-epbx (RETACRIT) Injectable 08447 Unit(s) IV Push <User Schedule>  gabapentin 100 milliGRAM(s) Oral three times a day  heparin   Injectable 5000 Unit(s) SubCutaneous every 8 hours  hydroxychloroquine 200 milliGRAM(s) Oral daily  influenza   Vaccine 0.5 milliLiter(s) IntraMuscular once  pantoprazole    Tablet 40 milliGRAM(s) Oral before breakfast  predniSONE   Tablet 50 milliGRAM(s) Oral daily  senna 2 Tablet(s) Oral at bedtime    MEDICATIONS  (PRN):  acetaminophen     Tablet .. 650 milliGRAM(s) Oral every 6 hours PRN Mild Pain (1 - 3)  aluminum hydroxide/magnesium hydroxide/simethicone Suspension 30 milliLiter(s) Oral every 4 hours PRN Dyspepsia  loperamide 2 milliGRAM(s) Oral three times a day PRN Diarrhea  melatonin 3 milliGRAM(s) Oral at bedtime PRN Insomnia  ondansetron Injectable 4 milliGRAM(s) IV Push every 8 hours PRN Nausea and/or Vomiting  oxycodone    5 mG/acetaminophen 325 mG 2 Tablet(s) Oral every 6 hours PRN Severe Pain (7 - 10)                Assessment and Plan:   Assessment:  · Assessment	  42 y/o F presents with fever    1. MRSA sepsis  Suspected line sepsis  Dr Harrington consult appreciated  2D echo No endocarditis  Continue IV daptomycin as per Dr Harrington  Repeat blood cultures tomorrow    2. ESRD on HD   - Nephrology consult - Dr. Pena  -HD as per renal       3. Hypokalemia-replace     4. Sacral decubitus ulcer, wounds on the left lower extremities   - Wound care consult for lower extremity wounds      5. History of aponia, bacteremia, COPD, depression, epilepsy, GERD, Raynaud's syndrome, heroin abuse, HTN, lupus, RA, sepsis, SLE, smoker, ESRD (initiated on HD 5 weeks ago with tunneled HD catheter placed 10/22/21)  - c/w home medications   - Counseled on smoking cessation, declined Nicotine patch     6. Anemia-chr due to ESRD  Follow    DVT ppx: Heparin 5,000 units Q8H subcutaneous daily (hold for PLT <50,000)  Code status: Full code (pt agrees to chest compressions and intubation if required)     
42 y/o F with PMH aponia, bacteremia, COPD, depression, epilepsy, GERD, Raynaud's syndrome, heroin abuse, HTN, lupus, RA, sepsis, SLE, smoker, ESRD (initiated on HD 5 weeks ago with tunneled HD catheter placed 10/22/21) presents for fevers over the last 3 days. Her highest temperature was 103.4F at home. She was taking Tylenol over the counter. She feels that her RA has been flaring up because she was been having difficulty moving her arms and legs. Reports headache, cough which is dry with minimal sputum production, soreness at the site of her Tunneled HD catheter, SOB in the mornings since starting HD 5 weeks ago, lower extremity swelling. Denies congestion, runny nose, sore throat, chest pain, palpitations, abdominal pain, N/V, diarrhea/constipation, burning on urination, urinary urgency/frequency. Of note, pt had a prior urine culture 10/11/2021 showed >100,000 of Enterobacter cloacae and a wound culture from 7/13/2020 which showed MRSA. She was supposed to go to HD today and called Dr. Pena's office who instructed her to come to the ER. Of note, pt has a history of bacteremia and had a tracheostomy tube placed and PEG.   ER course: -109. Labs: Hb 9.2, , BUN 67, Cr 5.92, Albumin 2.8, GFR 8. UA: total protein 510, protein/Cr ratio 10.6, protein 500, large blood, RBC 25-50, moderate bacteria, glucose 100. COVID negative. RSV negative. Influenza A and B negative.       11/17/21: Patient seen and examined. Catheter removed yesterday. Discussed with Dr Pena.  11/18/21: Possible shiley catheter placement tomorrow  11/19/21: Patient seen and examined. No new complaints.  11/20/21: Dialyzing today.       Vital Signs Last 24 Hrs  T(C): 36 (20 Nov 2021 07:50), Max: 37.1 (19 Nov 2021 14:23)  T(F): 96.8 (20 Nov 2021 07:50), Max: 98.7 (19 Nov 2021 14:23)  HR: 95 (20 Nov 2021 11:21) (86 - 108)  BP: 151/97 (20 Nov 2021 11:21) (127/81 - 167/117)  BP(mean): --  RR: 18 (20 Nov 2021 11:21) (17 - 26)  SpO2: 100% (20 Nov 2021 05:33) (96% - 100%)          Physical Exam:       General: Awake and alert, cooperative with exam. No acute distress.   Skin: Warm, dry, and pink. Pale complexion.   Eyes: Pupils equal and reactive to light. Extraocular eye movements intact. No conjunctival injection, discharge, or scleral icterus.   HEENT: Atraumatic, normocephalic. Moist mucus membranes.   Chest wall: Tunneled HD catheter without surrounding erythema or drainage.   Cardiology: Normal S1, S2. Systolic ejection murmur. Regular rate and rhythm.   Respiratory: Decreased breath sounds at the bases bilaterally. No wheezes, rales, or rhonchi. Normal chest expansion.   Gastrointestinal: Positive bowel sounds. Soft, non-tender, non-distended. No guarding, rigidity, or rebound tenderness. No hepatosplenomegaly.   Buttocks: Sacral decubitus ulcer with eschar, no surrounding erythema or purulent drainage.   Musculoskeletal: Contracted, decreased ROM of upper extremities.   Extremities: 2+ pitting edema bilaterally. Dorsalis pedis pulses 2+ bilaterally. Wound on left lower extremity approximately 2 x 2 cm with purulent drainage, no surrounding erythema.   Neurological: A+Ox3 (person, place, and time). Cranial nerves 2-12 intact. Normal speech. No facial droop. No focal neurological deficits. 5/5 motor strength in all extremities.   Psychiatric: Normal affect. Normal mood.                                                     9.7    7.74  )-----------( 219      ( 20 Nov 2021 07:47 )             30.2     20 Nov 2021 07:47    133    |  99     |  59     ----------------------------<  206    4.2     |  24     |  4.55     Ca    7.8        20 Nov 2021 07:47  Phos  3.4       20 Nov 2021 07:47  Mg     2.2       18 Nov 2021 12:00    TPro  x      /  Alb  2.3    /  TBili  x      /  DBili  x      /  AST  x      /  ALT  x      /  AlkPhos  x      20 Nov 2021 07:47    LIVER FUNCTIONS - ( 20 Nov 2021 07:47 )  Alb: 2.3 g/dL / Pro: x     / ALK PHOS: x     / ALT: x     / AST: x     / GGT: x                           MEDICATIONS  (STANDING):  amLODIPine   Tablet 10 milliGRAM(s) Oral daily  artificial  tears Solution 1 Drop(s) Both EYES two times a day  atovaquone  Suspension 1500 milliGRAM(s) Oral daily  calcium acetate 1334 milliGRAM(s) Oral three times a day with meals  cloNIDine 0.1 milliGRAM(s) Oral daily  DAPTOmycin IVPB 450 milliGRAM(s) IV Intermittent every 48 hours  epoetin amee-epbx (RETACRIT) Injectable 50996 Unit(s) IV Push <User Schedule>  gabapentin 100 milliGRAM(s) Oral three times a day  heparin   Injectable 5000 Unit(s) SubCutaneous every 8 hours  hydroxychloroquine 200 milliGRAM(s) Oral daily  influenza   Vaccine 0.5 milliLiter(s) IntraMuscular once  pantoprazole    Tablet 40 milliGRAM(s) Oral before breakfast  predniSONE   Tablet 50 milliGRAM(s) Oral daily  senna 2 Tablet(s) Oral at bedtime    MEDICATIONS  (PRN):  acetaminophen     Tablet .. 650 milliGRAM(s) Oral every 6 hours PRN Mild Pain (1 - 3)  aluminum hydroxide/magnesium hydroxide/simethicone Suspension 30 milliLiter(s) Oral every 4 hours PRN Dyspepsia  loperamide 2 milliGRAM(s) Oral three times a day PRN Diarrhea  melatonin 3 milliGRAM(s) Oral at bedtime PRN Insomnia  ondansetron Injectable 4 milliGRAM(s) IV Push every 8 hours PRN Nausea and/or Vomiting  oxycodone    5 mG/acetaminophen 325 mG 2 Tablet(s) Oral every 6 hours PRN Severe Pain (7 - 10)                Assessment and Plan:   Assessment:  · Assessment	  42 y/o F presents with fever    1. MRSA sepsis  Suspected line sepsis  Dr Harrington consult appreciated  2D echo No endocarditis  Continue IV daptomycin as per Dr Harrington  Repeat blood cultures still positive, next BC on Monday    2. ESRD on HD   - Nephrology follow up - Dr. Pena appreciated  -HD as per renal, dialyzing today via temp catheter    3. Hypokalemia-replaced     4. Sacral decubitus ulcer, wounds on the left lower extremities   - Wound care consult for lower extremity wounds      5. History of aponia, bacteremia, COPD, depression, epilepsy, GERD, Raynaud's syndrome, heroin abuse, HTN, lupus, RA, sepsis, SLE, smoker, ESRD (initiated on HD 5 weeks ago with tunneled HD catheter placed 10/22/21)  - c/w home medications   - Counseled on smoking cessation, declined Nicotine patch     6. Anemia-chr due to ESRD  Transfused one unit PRBC yesterday with HD    DVT ppx: Heparin 5,000 units Q8H subcutaneous daily (hold for PLT <50,000)  Code status: Full code (pt agrees to chest compressions and intubation if required)     
NEPHROLOGY INTERVAL HPI/OVERNIGHT EVENTS:    Date of Service: 21 @ 10:56    Covering for Rusty Soni/Jean  --No acute distress noted on HD.  Pt with flat affect and not interested in exam or answering questions.    HPI:  42 y/o F with PMH aponia, bacteremia, COPD, depression, epilepsy, GERD, Raynaud's syndrome, heroin abuse, HTN, lupus, RA, sepsis, SLE, smoker, ESRD (initiated on HD 5 weeks ago with tunneled HD catheter placed 10/22/21) presents for fevers over the last 3 days. Her highest temperature was 103.4F at home. She was taking Tylenol over the counter. She feels that her RA has been flaring up because she was been having difficulty moving her arms and legs. Reports headache, cough which is dry with minimal sputum production, soreness at the site of her Tunneled HD catheter, SOB in the mornings since starting HD 5 weeks ago, lower extremity swelling. Denies congestion, runny nose, sore throat, chest pain, palpitations, abdominal pain, N/V, diarrhea/constipation, burning on urination, urinary urgency/frequency. Of note, pt had a prior urine culture 10/11/2021 showed >100,000 of Enterobacter cloacae and a wound culture from 2020 which showed MRSA. She was supposed to go to HD today and called Dr. Pena's office who instructed her to come to the ER. Of note, pt has a history of bacteremia and had a tracheostomy tube placed and PEG.     ER course: -109. Labs: Hb 9.2, , BUN 67, Cr 5.92, Albumin 2.8, GFR 8. UA: total protein 510, protein/Cr ratio 10.6, protein 500, large blood, RBC 25-50, moderate bacteria, glucose 100. COVID negative. RSV negative. Influenza A and B negative.     MEDICATIONS  (STANDING):  amLODIPine   Tablet 10 milliGRAM(s) Oral daily  artificial  tears Solution 1 Drop(s) Both EYES two times a day  atovaquone  Suspension 1500 milliGRAM(s) Oral daily  calcium acetate 1334 milliGRAM(s) Oral three times a day with meals  chlorhexidine 4% Liquid 1 Application(s) Topical <User Schedule>  cloNIDine 0.1 milliGRAM(s) Oral daily  DAPTOmycin IVPB 450 milliGRAM(s) IV Intermittent every 48 hours  epoetin amee-epbx (RETACRIT) Injectable 59585 Unit(s) IV Push <User Schedule>  furosemide   Injectable 80 milliGRAM(s) IV Push once  gabapentin 100 milliGRAM(s) Oral three times a day  heparin   Injectable 5000 Unit(s) SubCutaneous every 8 hours  hydroxychloroquine 200 milliGRAM(s) Oral daily  influenza   Vaccine 0.5 milliLiter(s) IntraMuscular once  pantoprazole    Tablet 40 milliGRAM(s) Oral before breakfast  predniSONE   Tablet 50 milliGRAM(s) Oral daily  senna 2 Tablet(s) Oral at bedtime    MEDICATIONS  (PRN):  acetaminophen     Tablet .. 650 milliGRAM(s) Oral every 6 hours PRN Mild Pain (1 - 3)  aluminum hydroxide/magnesium hydroxide/simethicone Suspension 30 milliLiter(s) Oral every 4 hours PRN Dyspepsia  loperamide 2 milliGRAM(s) Oral three times a day PRN Diarrhea  melatonin 3 milliGRAM(s) Oral at bedtime PRN Insomnia  ondansetron Injectable 4 milliGRAM(s) IV Push every 8 hours PRN Nausea and/or Vomiting  oxycodone    5 mG/acetaminophen 325 mG 2 Tablet(s) Oral every 6 hours PRN Severe Pain (7 - 10)  sodium chloride 0.9% lock flush 10 milliLiter(s) IV Push every 1 hour PRN Pre/post blood products, medications, blood draw, and to maintain line patency    Vital Signs Last 24 Hrs  T(C): 36 (2021 07:50), Max: 37.1 (2021 14:23)  T(F): 96.8 (2021 07:50), Max: 98.7 (2021 14:23)  HR: 95 (2021 10:42) (86 - 108)  BP: 155/100 (2021 10:42) (127/81 - 167/117)  BP(mean): --  RR: 17 (2021 10:42) (17 - 26)  SpO2: 100% (2021 05:33) (96% - 100%)  Daily     Daily Weight in k.8 (2021 05:33)    - @ 07:01  -   @ 07:00  --------------------------------------------------------  IN: 403 mL / OUT: 4003 mL / NET: -3600 mL    PHYSICAL EXAM:  GENERAL: No distress.  CHEST/LUNG: scattered rhonchi  HEART: S1S2 RRR  ABDOMEN: soft  EXTREMITIES: no edema  SKIN:     LABS:                        9.7    7.74  )-----------( 219      ( 2021 07:47 )             30.2     11-20    133<L>  |  99  |  59<H>  ----------------------------<  206<H>  4.2   |  24  |  4.55<H>    Ca    7.8<L>      2021 07:47  Phos  3.4     -  Mg     2.2     -18    TPro  x   /  Alb  2.3<L>  /  TBili  x   /  DBili  x   /  AST  x   /  ALT  x   /  AlkPhos  x   11-20        Phosphorus Level, Serum: 3.4 mg/dL ( @ 07:47)          RADIOLOGY & ADDITIONAL TESTS:  
Patient is a 41y Female who reports no complaints    catheter removed yest    as per HD staff  catheter has been wrapped and so manipulation not suspected     MEDICATIONS  (STANDING):  amLODIPine   Tablet 10 milliGRAM(s) Oral daily  artificial  tears Solution 1 Drop(s) Both EYES two times a day  atovaquone  Suspension 1500 milliGRAM(s) Oral daily  calcium acetate 1334 milliGRAM(s) Oral three times a day with meals  cloNIDine 0.1 milliGRAM(s) Oral daily  DAPTOmycin IVPB 450 milliGRAM(s) IV Intermittent every 48 hours  epoetin amee-epbx (RETACRIT) Injectable 34173 Unit(s) IV Push <User Schedule>  gabapentin 100 milliGRAM(s) Oral three times a day  heparin   Injectable 5000 Unit(s) SubCutaneous every 8 hours  hydroxychloroquine 200 milliGRAM(s) Oral daily  influenza   Vaccine 0.5 milliLiter(s) IntraMuscular once  pantoprazole    Tablet 40 milliGRAM(s) Oral before breakfast  predniSONE   Tablet 50 milliGRAM(s) Oral daily  senna 2 Tablet(s) Oral at bedtime      Vital Signs Last 24 Hrs  T(C): 36.6 (17 Nov 2021 21:23), Max: 36.8 (17 Nov 2021 16:07)  T(F): 97.8 (17 Nov 2021 21:23), Max: 98.3 (17 Nov 2021 16:07)  HR: 100 (17 Nov 2021 21:23) (93 - 100)  BP: 152/97 (17 Nov 2021 21:23) (136/90 - 152/97)  BP(mean): --  RR: 18 (17 Nov 2021 21:23) (18 - 18)  SpO2: 98% (17 Nov 2021 21:23) (97% - 98%)    Weight (kg): 53.7 (11-15 @ 23:20)    PHYSICAL EXAM:    Constitutional: NAD, frail, >>then stated age  HEENT: dry MM  dist   Cardiovascular: S1 and S2  Extremities: ++ peripheral edema  Neurological: A/O x 3  : No Salgado  Skin: No rashes  Access: marcy cath R, ecchy "scratch marks" medial to catheter      LABS:                        137    |  101    |  57     ----------------------------<  149       17 Nov 2021 11:35  3.0     |  25     |  5.54     132    |  96     |  38     ----------------------------<  190       16 Nov 2021 18:12  3.5     |  26     |  4.49     135    |  97     |  67     ----------------------------<  88        15 Nov 2021 14:48  4.2     |  29     |  5.92     Ca    8.1        17 Nov 2021 11:35  Ca    8.0        16 Nov 2021 18:12        TPro  7.0    /  Alb  2.8    /  TBili  0.3    /        15 Nov 2021 14:48  DBili  x      /  AST  15     /  ALT  15     /  AlkPhos  82                    7.6    4.98  )-----------( 161      ( 17 Nov 2021 11:35 )             24.8                         8.4    5.97  )-----------( 135      ( 16 Nov 2021 18:12 )             27.3       Hepatitis C Virus S/CO Ratio: 0.23 S/CO [0.00 - 0.99] (11-15 @ 14:48)  Hepatitis C Virus Interpretation: Nonreact (11-15 @ 14:48)      Urine Studies:          RADIOLOGY & ADDITIONAL STUDIES:              
Patient is a 41y Female who reports no complaints as new, discomfort to marcy cath site. She denies manipulation.         MEDICATIONS  (STANDING):  amLODIPine   Tablet 10 milliGRAM(s) Oral daily  artificial  tears Solution 1 Drop(s) Both EYES two times a day  atovaquone  Suspension 1500 milliGRAM(s) Oral daily  calcium acetate 1334 milliGRAM(s) Oral three times a day with meals  cloNIDine 0.1 milliGRAM(s) Oral daily  DAPTOmycin IVPB 450 milliGRAM(s) IV Intermittent every 48 hours  epoetin amee-epbx (RETACRIT) Injectable 22280 Unit(s) IV Push <User Schedule>  gabapentin 100 milliGRAM(s) Oral three times a day  heparin   Injectable 5000 Unit(s) SubCutaneous every 8 hours  hydroxychloroquine 200 milliGRAM(s) Oral daily  influenza   Vaccine 0.5 milliLiter(s) IntraMuscular once  pantoprazole    Tablet 40 milliGRAM(s) Oral before breakfast  predniSONE   Tablet 50 milliGRAM(s) Oral daily  senna 2 Tablet(s) Oral at bedtime    MEDICATIONS  (PRN):  acetaminophen     Tablet .. 650 milliGRAM(s) Oral every 6 hours PRN Mild Pain (1 - 3)  aluminum hydroxide/magnesium hydroxide/simethicone Suspension 30 milliLiter(s) Oral every 4 hours PRN Dyspepsia  loperamide 2 milliGRAM(s) Oral three times a day PRN Diarrhea  melatonin 3 milliGRAM(s) Oral at bedtime PRN Insomnia  ondansetron Injectable 4 milliGRAM(s) IV Push every 8 hours PRN Nausea and/or Vomiting  oxycodone    5 mG/acetaminophen 325 mG 2 Tablet(s) Oral every 6 hours PRN Severe Pain (7 - 10)        T(C): , Max: 37.7 (11-15-21 @ 16:00)  T(F): , Max: 99.8 (11-15-21 @ 16:00)  HR: 106 (11-16-21 @ 09:48)  BP: 150/89 (11-16-21 @ 09:48)  BP(mean): --  RR: 17 (11-16-21 @ 09:48)  SpO2: 93% (11-16-21 @ 09:48)  Wt(kg): --      Weight (kg): 53.7 (11-15 @ 23:20)    PHYSICAL EXAM:    Constitutional: NAD, frail, >>then stated age  HEENT: dry MM  dist   Cardiovascular: S1 and S2  Extremities: ++ peripheral edema  Neurological: A/O x 3  : No Salgado  Skin: No rashes  Access: marcy goss R, ecchy "scratch marks" medial to catheter      LABS:                        9.2    4.83  )-----------( 140      ( 15 Nov 2021 14:48 )             29.8     15 Nov 2021 14:48    135    |  97     |  67     ----------------------------<  88     4.2     |  29     |  5.92     Ca    8.7        15 Nov 2021 14:48    TPro  7.0    /  Alb  2.8    /  TBili  0.3    /  DBili  x      /  AST  15     /  ALT  15     /  AlkPhos  82     15 Nov 2021 14:48      Hepatitis C Virus S/CO Ratio: 0.23 S/CO [0.00 - 0.99] (11-15 @ 14:48)  Hepatitis C Virus Interpretation: Nonreact (11-15 @ 14:48)      Urine Studies:          RADIOLOGY & ADDITIONAL STUDIES:              
40 y/o F with PMH aponia, bacteremia, COPD, depression, epilepsy, GERD, Raynaud's syndrome, heroin abuse, HTN, lupus, RA, sepsis, SLE, smoker, ESRD (initiated on HD 5 weeks ago with tunneled HD catheter placed 10/22/21) presents for fevers over the last 3 days. Her highest temperature was 103.4F at home. She was taking Tylenol over the counter. She feels that her RA has been flaring up because she was been having difficulty moving her arms and legs. Reports headache, cough which is dry with minimal sputum production, soreness at the site of her Tunneled HD catheter, SOB in the mornings since starting HD 5 weeks ago, lower extremity swelling. Denies congestion, runny nose, sore throat, chest pain, palpitations, abdominal pain, N/V, diarrhea/constipation, burning on urination, urinary urgency/frequency. Of note, pt had a prior urine culture 10/11/2021 showed >100,000 of Enterobacter cloacae and a wound culture from 7/13/2020 which showed MRSA. She was supposed to go to HD today and called Dr. Pena's office who instructed her to come to the ER. Of note, pt has a history of bacteremia and had a tracheostomy tube placed and PEG.   ER course: -109. Labs: Hb 9.2, , BUN 67, Cr 5.92, Albumin 2.8, GFR 8. UA: total protein 510, protein/Cr ratio 10.6, protein 500, large blood, RBC 25-50, moderate bacteria, glucose 100. COVID negative. RSV negative. Influenza A and B negative.           Vital Signs Last 24 Hrs  T(C): 37.2 (16 Nov 2021 09:48), Max: 37.7 (15 Nov 2021 16:00)  T(F): 99 (16 Nov 2021 09:48), Max: 99.8 (15 Nov 2021 16:00)  HR: 106 (16 Nov 2021 09:48) (102 - 115)  BP: 150/89 (16 Nov 2021 09:48) (125/77 - 150/89)  BP(mean): 107 (15 Nov 2021 13:59) (107 - 107)  RR: 17 (16 Nov 2021 09:48) (15 - 18)  SpO2: 93% (16 Nov 2021 09:48) (92% - 98%)        General: Awake and alert, cooperative with exam. No acute distress.   Skin: Warm, dry, and pink. Pale complexion.   Eyes: Pupils equal and reactive to light. Extraocular eye movements intact. No conjunctival injection, discharge, or scleral icterus.   HEENT: Atraumatic, normocephalic. Moist mucus membranes.   Chest wall: Tunneled HD catheter without surrounding erythema or drainage.   Cardiology: Normal S1, S2. Systolic ejection murmur. Regular rate and rhythm.   Respiratory: Decreased breath sounds at the bases bilaterally. No wheezes, rales, or rhonchi. Normal chest expansion.   Gastrointestinal: Positive bowel sounds. Soft, non-tender, non-distended. No guarding, rigidity, or rebound tenderness. No hepatosplenomegaly.   Buttocks: Sacral decubitus ulcer with eschar, no surrounding erythema or purulent drainage.   Musculoskeletal: Contracted, decreased ROM of upper extremities.   Extremities: 2+ pitting edema bilaterally. Dorsalis pedis pulses 2+ bilaterally. Wound on left lower extremity approximately 2 x 2 cm with purulent drainage, no surrounding erythema.   Neurological: A+Ox3 (person, place, and time). Cranial nerves 2-12 intact. Normal speech. No facial droop. No focal neurological deficits. 5/5 motor strength in all extremities.   Psychiatric: Normal affect. Normal mood.                              9.2    4.83  )-----------( 140      ( 15 Nov 2021 14:48 )             29.8     15 Nov 2021 14:48    135    |  97     |  67     ----------------------------<  88     4.2     |  29     |  5.92     Ca    8.7        15 Nov 2021 14:48    TPro  7.0    /  Alb  2.8    /  TBili  0.3    /  DBili  x      /  AST  15     /  ALT  15     /  AlkPhos  82     15 Nov 2021 14:48    LIVER FUNCTIONS - ( 15 Nov 2021 14:48 )  Alb: 2.8 g/dL / Pro: 7.0 gm/dL / ALK PHOS: 82 U/L / ALT: 15 U/L / AST: 15 U/L / GGT: x           PT/INR - ( 15 Nov 2021 14:48 )   PT: 13.1 sec;   INR: 1.14 ratio         PTT - ( 15 Nov 2021 14:48 )  PTT:29.1 sec  CAPILLARY BLOOD GLUCOSE          MEDICATIONS  (STANDING):  amLODIPine   Tablet 10 milliGRAM(s) Oral daily  artificial  tears Solution 1 Drop(s) Both EYES two times a day  atovaquone  Suspension 1500 milliGRAM(s) Oral daily  calcium acetate 1334 milliGRAM(s) Oral three times a day with meals  cloNIDine 0.1 milliGRAM(s) Oral daily  DAPTOmycin IVPB 450 milliGRAM(s) IV Intermittent every 48 hours  epoetin amee-epbx (RETACRIT) Injectable 40466 Unit(s) IV Push <User Schedule>  gabapentin 100 milliGRAM(s) Oral three times a day  heparin   Injectable 5000 Unit(s) SubCutaneous every 8 hours  hydroxychloroquine 200 milliGRAM(s) Oral daily  influenza   Vaccine 0.5 milliLiter(s) IntraMuscular once  pantoprazole    Tablet 40 milliGRAM(s) Oral before breakfast  predniSONE   Tablet 50 milliGRAM(s) Oral daily  senna 2 Tablet(s) Oral at bedtime    MEDICATIONS  (PRN):  acetaminophen     Tablet .. 650 milliGRAM(s) Oral every 6 hours PRN Mild Pain (1 - 3)  aluminum hydroxide/magnesium hydroxide/simethicone Suspension 30 milliLiter(s) Oral every 4 hours PRN Dyspepsia  loperamide 2 milliGRAM(s) Oral three times a day PRN Diarrhea  melatonin 3 milliGRAM(s) Oral at bedtime PRN Insomnia  ondansetron Injectable 4 milliGRAM(s) IV Push every 8 hours PRN Nausea and/or Vomiting  oxycodone    5 mG/acetaminophen 325 mG 2 Tablet(s) Oral every 6 hours PRN Severe Pain (7 - 10)                Assessment and Plan:   Assessment:  · Assessment	  40 y/o F presents with fever    1. MRSA sepsis  R/O line sepsis  Dr Harrington consult appreciated  Check 2D echo R/O endocarditis  Continue IV daptomycin as per Dr Harrington  Consider removing dialysis catheter    2. ESRD on HD   - Nephrology consult - Dr. Pena  -HD as per renal       3. Asymptomatic bacteriuria   - UA: moderate bacteria   - f/u UCx and adjust abx based on sensitivities     4. Sacral decubitus ulcer, wounds on the left lower extremities   - Wound care consult for lower extremity wounds      5. History of aponia, bacteremia, COPD, depression, epilepsy, GERD, Raynaud's syndrome, heroin abuse, HTN, lupus, RA, sepsis, SLE, smoker, ESRD (initiated on HD 5 weeks ago with tunneled HD catheter placed 10/22/21)  - c/w home medications   - Counseled on smoking cessation, declined Nicotine patch     DVT ppx: Heparin 5,000 units Q8H subcutaneous daily (hold for PLT <50,000)  Code status: Full code (pt agrees to chest compressions and intubation if required)     
Patient is a 41y Female who reports c/o sob and edema      MEDICATIONS  (STANDING):  amLODIPine   Tablet 10 milliGRAM(s) Oral daily  artificial  tears Solution 1 Drop(s) Both EYES two times a day  atovaquone  Suspension 1500 milliGRAM(s) Oral daily  calcium acetate 1334 milliGRAM(s) Oral three times a day with meals  cloNIDine 0.1 milliGRAM(s) Oral daily  DAPTOmycin IVPB 450 milliGRAM(s) IV Intermittent every 48 hours  epoetin amee-epbx (RETACRIT) Injectable 03122 Unit(s) IV Push <User Schedule>  gabapentin 100 milliGRAM(s) Oral three times a day  heparin   Injectable 5000 Unit(s) SubCutaneous every 8 hours  hydroxychloroquine 200 milliGRAM(s) Oral daily  influenza   Vaccine 0.5 milliLiter(s) IntraMuscular once  pantoprazole    Tablet 40 milliGRAM(s) Oral before breakfast  predniSONE   Tablet 50 milliGRAM(s) Oral daily  senna 2 Tablet(s) Oral at bedtime      Vital Signs Last 24 Hrs  Vital Signs Last 24 Hrs  T(C): 36.9 (19 Nov 2021 19:42), Max: 37.1 (19 Nov 2021 14:23)  T(F): 98.5 (19 Nov 2021 19:42), Max: 98.7 (19 Nov 2021 14:23)  HR: 99 (19 Nov 2021 19:42) (93 - 108)  BP: 167/117 (19 Nov 2021 19:42) (136/97 - 167/117)  BP(mean): --  RR: 18 (19 Nov 2021 19:42) (18 - 26)  SpO2: 100% (19 Nov 2021 19:42) (96% - 100%)      PHYSICAL EXAM:    Constitutional: NAD, frail, >>then stated age  HEENT: dry MM  dist   Cardiovascular: S1 and S2  Extremities: ++ peripheral edema  Neurological: A/O x 3  : No Salgado  Skin: No rashes  Access: temp cath      LABS:                            7.2    6.33  )-----------( 210      ( 19 Nov 2021 09:30 )             23.9                         7.6    5.89  )-----------( 199      ( 18 Nov 2021 12:00 )             24.1     137    |  104    |  115    ----------------------------<  88        19 Nov 2021 09:30  4.2     |  23     |  7.35     139    |  103    |  82     ----------------------------<  91        18 Nov 2021 12:00  3.4     |  26     |  6.55     137    |  101    |  57     ----------------------------<  149       17 Nov 2021 11:35  3.0     |  25     |  5.54     Ca    7.9        19 Nov 2021 09:30  Ca    8.1        18 Nov 2021 12:00    Phos  3.1       18 Nov 2021 12:00    Mg     2.2       18 Nov 2021 12:00        Hepatitis C Virus S/CO Ratio: 0.23 S/CO [0.00 - 0.99] (11-15 @ 14:48)  Hepatitis C Virus Interpretation: Nonreact (11-15 @ 14:48)      Urine Studies:          RADIOLOGY & ADDITIONAL STUDIES:              
Patient is a 41y Female who reports no complaints as new, seen with HD. Adamant re ama      MEDICATIONS  (STANDING):  amLODIPine   Tablet 10 milliGRAM(s) Oral daily  artificial  tears Solution 1 Drop(s) Both EYES two times a day  atovaquone  Suspension 1500 milliGRAM(s) Oral daily  chlorhexidine 4% Liquid 1 Application(s) Topical <User Schedule>  cloNIDine 0.1 milliGRAM(s) Oral daily  DAPTOmycin IVPB 450 milliGRAM(s) IV Intermittent every 48 hours  epoetin amee-epbx (RETACRIT) Injectable 96916 Unit(s) IV Push <User Schedule>  furosemide   Injectable 60 milliGRAM(s) IV Push daily  gabapentin 100 milliGRAM(s) Oral three times a day  heparin   Injectable 5000 Unit(s) SubCutaneous every 8 hours  hydroxychloroquine 200 milliGRAM(s) Oral daily  influenza   Vaccine 0.5 milliLiter(s) IntraMuscular once  pantoprazole    Tablet 40 milliGRAM(s) Oral before breakfast  predniSONE   Tablet 50 milliGRAM(s) Oral daily  senna 2 Tablet(s) Oral at bedtime    MEDICATIONS  (PRN):  acetaminophen     Tablet .. 650 milliGRAM(s) Oral every 6 hours PRN Mild Pain (1 - 3)  aluminum hydroxide/magnesium hydroxide/simethicone Suspension 30 milliLiter(s) Oral every 4 hours PRN Dyspepsia  loperamide 2 milliGRAM(s) Oral three times a day PRN Diarrhea  melatonin 3 milliGRAM(s) Oral at bedtime PRN Insomnia  ondansetron Injectable 4 milliGRAM(s) IV Push every 8 hours PRN Nausea and/or Vomiting  oxycodone    5 mG/acetaminophen 325 mG 2 Tablet(s) Oral every 4 hours PRN Severe Pain (7 - 10)  sodium chloride 0.9% lock flush 10 milliLiter(s) IV Push every 1 hour PRN Pre/post blood products, medications, blood draw, and to maintain line patency        T(C): , Max: 37.1 (11-24-21 @ 07:45)  T(F): , Max: 98.8 (11-24-21 @ 07:45)  HR: 86 (11-24-21 @ 07:50)  BP: 133/96 (11-24-21 @ 07:50)  BP(mean): --  RR: 16 (11-24-21 @ 07:50)  SpO2: 100% (11-24-21 @ 07:18)  Wt(kg): --        PHYSICAL EXAM:    Constitutional: frail, >>stated age  HEENT: PERRLA, EOMI,  MMM  Neck: No LAD, No JVD  Respiratory: idst  Cardiovascular: S1 and S2, RRR  Gastrointestinal: BS+, soft, NT/ND  Extremities: chronic changes  Neurological: A/O x 3, no focal deficits  Psychiatric: Normal mood, normal affect  : No Salgado  Skin: No rashes  Access: temp cath R        LABS:                        8.9    16.39 )-----------( 350      ( 24 Nov 2021 07:57 )             28.5     23 Nov 2021 07:32    133    |  102    |  54     ----------------------------<  90     4.5     |  23     |  4.24   22 Nov 2021 08:03    133    |  100    |  77     ----------------------------<  113    3.7     |  20     |  5.00     Ca    7.7        23 Nov 2021 07:32  Ca    8.0        22 Nov 2021 08:03  Phos  3.1       23 Nov 2021 07:32  Phos  2.3       22 Nov 2021 08:03  Mg     1.9       23 Nov 2021 07:32    TPro  x      /  Alb  2.7    /  TBili  x      /  DBili  x      /  AST  x      /  ALT  x      /  AlkPhos  x      22 Nov 2021 08:03          Urine Studies:          RADIOLOGY & ADDITIONAL STUDIES:              
40 y/o F with PMH [place], bacteremia, COPD, depression, epilepsy, GERD, Raynaud's syndrome, heroin abuse, HTN, lupus, RA, sepsis, SLE, smoker, ESRD (initiated on HD 5 weeks ago with tunneled HD catheter placed 10/22/21) presents for fevers over the last 3 days. Her highest temperature was 103.4F at home. She was taking Tylenol over the counter. She feels that her RA has been flaring up because she was been having difficulty moving her arms and legs. Reports headache, cough which is dry with minimal sputum production, soreness at the site of her Tunneled HD catheter, SOB in the mornings since starting HD 5 weeks ago, lower extremity swelling. Denies congestion, runny nose, sore throat, chest pain, palpitations, abdominal pain, N/V, diarrhea/constipation, burning on urination, urinary urgency/frequency. Of note, pt had a prior urine culture 10/11/2021 showed >100,000 of Enterobacter cloacae and a wound culture from 7/13/2020 which showed MRSA. She was supposed to go to HD today and called Dr. Pena's office who instructed her to come to the ER. Of note, pt has a history of bacteremia and had a tracheostomy tube placed and PEG.   ER course: -109. Labs: Hb 9.2, , BUN 67, Cr 5.92, Albumin 2.8, GFR 8. UA: total protein 510, protein/Cr ratio 10.6, protein 500, large blood, RBC 25-50, moderate bacteria, glucose 100. COVID negative. RSV negative. Influenza A and B negative.       11/17/21: Patient seen and examined. Catheter removed yesterday. Discussed with Dr Pena.  11/18/21: Possible shiley catheter placement tomorrow  11/19/21: Patient seen and examined. No new complaints.  11/20/21: Dialyzing today.   11/21/21: No new issues. Repeat blood cultures still positive for MRSA. Discussed with patient regarding management plan.       Vital Signs Last 24 Hrs  T(C): 36.3 (21 Nov 2021 08:15), Max: 36.6 (20 Nov 2021 15:18)  T(F): 97.4 (21 Nov 2021 08:15), Max: 97.9 (20 Nov 2021 15:18)  HR: 92 (21 Nov 2021 08:15) (88 - 99)  BP: 164/97 (21 Nov 2021 08:15) (130/83 - 164/97)  BP(mean): --  RR: 18 (21 Nov 2021 08:15) (17 - 18)  SpO2: 100% (21 Nov 2021 08:15) (99% - 100%)        Physical Exam:       General: Awake and alert, cooperative with exam. No acute distress.   Skin: Warm, dry, and pink. Pale complexion.   Eyes: Pupils equal and reactive to light. Extraocular eye movements intact. No conjunctival injection, discharge, or scleral icterus.   HEENT: Atraumatic, normocephalic. Moist mucus membranes.   Chest wall: Tunneled HD catheter without surrounding erythema or drainage.   Cardiology: Normal S1, S2. Systolic ejection murmur. Regular rate and rhythm.   Respiratory: Decreased breath sounds at the bases bilaterally. No wheezes, rales, or rhonchi. Normal chest expansion.   Gastrointestinal: Positive bowel sounds. Soft, non-tender, non-distended. No guarding, rigidity, or rebound tenderness. No hepatosplenomegaly.   Buttocks: Sacral decubitus ulcer with eschar, no surrounding erythema or purulent drainage.   Musculoskeletal: Contracted, decreased ROM of upper extremities.   Extremities: 2+ pitting edema bilaterally. Dorsalis pedis pulses 2+ bilaterally. Wound on left lower extremity approximately 2 x 2 cm with purulent drainage, no surrounding erythema.   Neurological: A+Ox3 (person, place, and time). Cranial nerves 2-12 intact. Normal speech. No facial droop. No focal neurological deficits. 5/5 motor strength in all extremities.   Psychiatric: Normal affect. Normal mood.                                                     9.7    7.74  )-----------( 219      ( 20 Nov 2021 07:47 )             30.2     20 Nov 2021 07:47    133    |  99     |  59     ----------------------------<  206    4.2     |  24     |  4.55     Ca    7.8        20 Nov 2021 07:47  Phos  3.4       20 Nov 2021 07:47    TPro  x      /  Alb  2.3    /  TBili  x      /  DBili  x      /  AST  x      /  ALT  x      /  AlkPhos  x      20 Nov 2021 07:47    LIVER FUNCTIONS - ( 20 Nov 2021 07:47 )  Alb: 2.3 g/dL / Pro: x     / ALK PHOS: x     / ALT: x     / AST: x     / GGT: x                         MEDICATIONS  (STANDING):  amLODIPine   Tablet 10 milliGRAM(s) Oral daily  artificial  tears Solution 1 Drop(s) Both EYES two times a day  atovaquone  Suspension 1500 milliGRAM(s) Oral daily  calcium acetate 1334 milliGRAM(s) Oral three times a day with meals  cloNIDine 0.1 milliGRAM(s) Oral daily  DAPTOmycin IVPB 450 milliGRAM(s) IV Intermittent every 48 hours  epoetin amee-epbx (RETACRIT) Injectable 12961 Unit(s) IV Push <User Schedule>  gabapentin 100 milliGRAM(s) Oral three times a day  heparin   Injectable 5000 Unit(s) SubCutaneous every 8 hours  hydroxychloroquine 200 milliGRAM(s) Oral daily  influenza   Vaccine 0.5 milliLiter(s) IntraMuscular once  pantoprazole    Tablet 40 milliGRAM(s) Oral before breakfast  predniSONE   Tablet 50 milliGRAM(s) Oral daily  senna 2 Tablet(s) Oral at bedtime    MEDICATIONS  (PRN):  acetaminophen     Tablet .. 650 milliGRAM(s) Oral every 6 hours PRN Mild Pain (1 - 3)  aluminum hydroxide/magnesium hydroxide/simethicone Suspension 30 milliLiter(s) Oral every 4 hours PRN Dyspepsia  loperamide 2 milliGRAM(s) Oral three times a day PRN Diarrhea  melatonin 3 milliGRAM(s) Oral at bedtime PRN Insomnia  ondansetron Injectable 4 milliGRAM(s) IV Push every 8 hours PRN Nausea and/or Vomiting  oxycodone    5 mG/acetaminophen 325 mG 2 Tablet(s) Oral every 6 hours PRN Severe Pain (7 - 10)                Assessment and Plan:   Assessment:  · Assessment	  40 y/o F presents with fever    1. MRSA sepsis  Suspected line sepsis  Dr Harrington consult appreciated  2D echo No endocarditis  Continue IV daptomycin as per Dr Harrington  Repeat blood cultures still positive, next BC tomorrow    2. ESRD on HD   - Nephrology follow up - Dr. Pena appreciated  -HD as per renal, dialyzed yesterday via temp catheter    3. Hypokalemia-replaced     4. Sacral decubitus ulcer, wounds on the left lower extremities   - Wound care consult for lower extremity wounds      5. History of [place], bacteremia, COPD, depression, epilepsy, GERD, Raynaud's syndrome, heroin abuse, HTN, lupus, RA, sepsis, SLE, smoker, ESRD (initiated on HD 5 weeks ago with tunneled HD catheter placed 10/22/21)  - c/w home medications   - Counseled on smoking cessation, declined Nicotine patch     6. Anemia-chr due to ESRD  Transfused one unit PRBC  with HD    DVT ppx: Heparin 5,000 units Q8H subcutaneous daily (hold for PLT <50,000)  Code status: Full code      
40 y/o F with PMH ap[place], bacteremia, COPD, depression, epilepsy, GERD, Raynaud's syndrome, heroin abuse, HTN, lupus, RA, sepsis, SLE, smoker, ESRD (initiated on HD 5 weeks ago with tunneled HD catheter placed 10/22/21) presents for fevers over the last 3 days. Her highest temperature was 103.4F at home. She was taking Tylenol over the counter. She feels that her RA has been flaring up because she was been having difficulty moving her arms and legs. Reports headache, cough which is dry with minimal sputum production, soreness at the site of her Tunneled HD catheter, SOB in the mornings since starting HD 5 weeks ago, lower extremity swelling. Denies congestion, runny nose, sore throat, chest pain, palpitations, abdominal pain, N/V, diarrhea/constipation, burning on urination, urinary urgency/frequency. Of note, pt had a prior urine culture 10/11/2021 showed >100,000 of Enterobacter cloacae and a wound culture from 7/13/2020 which showed MRSA. She was supposed to go to HD today and called Dr. Pena's office who instructed her to come to the ER. Of note, pt has a history of bacteremia and had a tracheostomy tube placed and PEG.   ER course: -109. Labs: Hb 9.2, , BUN 67, Cr 5.92, Albumin 2.8, GFR 8. UA: total protein 510, protein/Cr ratio 10.6, protein 500, large blood, RBC 25-50, moderate bacteria, glucose 100. COVID negative. RSV negative. Influenza A and B negative.       11/17/21: Patient seen and examined. Catheter removed yesterday. Discussed with Dr Pena.  11/18/21: Possible shiley catheter placement tomorrow  11/19/21: Patient seen and examined. No new complaints.  11/20/21: Dialyzing today.   11/21/21: No new issues. Repeat blood cultures still positive for MRSA. Discussed with patient regarding management plan.   11/22: seen and examined after dialysis - no issues. no dyspnea or chest pain today.   11/23: laying in bed, no pain or discomfort today, no dyspnea. plan to remove shiley tomorrow after 1 round of dialysis       Vital Signs Last 24 Hrs  T(C): 36.9 (23 Nov 2021 07:45), Max: 37.1 (22 Nov 2021 15:06)  T(F): 98.4 (23 Nov 2021 07:45), Max: 98.7 (22 Nov 2021 15:06)  HR: 80 (23 Nov 2021 07:45) (80 - 102)  BP: 130/90 (23 Nov 2021 07:45) (129/91 - 146/94)  BP(mean): --  RR: 18 (23 Nov 2021 07:45) (18 - 18)  SpO2: 100% (23 Nov 2021 07:45) (100% - 100%)    Physical Exam:       General: Awake and alert, cooperative with exam. No acute distress.   Skin: Warm, dry, and pink. Pale complexion.   Eyes: Pupils equal and reactive to light. Extraocular eye movements intact. No conjunctival injection, discharge, or scleral icterus.   HEENT: Atraumatic, normocephalic. Moist mucus membranes.   Chest wall: right chest wall shiley without surrounding erythema or drainage.   Cardiology: Normal S1, S2. Systolic ejection murmur. Regular rate and rhythm.   Respiratory: Decreased breath sounds at the bases bilaterally. No wheezes, rales, or rhonchi. Normal chest expansion.   Gastrointestinal: Positive bowel sounds. Soft, non-tender, non-distended. No guarding, rigidity, or rebound tenderness. No hepatosplenomegaly.   Buttocks: Sacral decubitus ulcer with eschar, no surrounding erythema or purulent drainage.   Musculoskeletal: Contracted, decreased ROM of upper extremities.   Extremities: 2+ pitting edema bilaterally. Dorsalis pedis pulses 2+ bilaterally. Wound on left lower extremity approximately 2 x 2 cm with purulent drainage, no surrounding erythema.   Neurological: A+Ox3 (person, place, and time). Cranial nerves 2-12 intact. Normal speech. No facial droop. No focal neurological deficits. 5/5 motor strength in all extremities.   Psychiatric: Normal affect. Normal mood.                                8.8    14.81 )-----------( 289      ( 23 Nov 2021 07:32 )             27.6     11-23    133<L>  |  102  |  54<H>  ----------------------------<  90  4.5   |  23  |  4.24<H>    Ca    7.7<L>      23 Nov 2021 07:32  Phos  3.1     11-23  Mg     1.9     11-23    TPro  x   /  Alb  2.7<L>  /  TBili  x   /  DBili  x   /  AST  x   /  ALT  x   /  AlkPhos  x   11-22        LIVER FUNCTIONS - ( 22 Nov 2021 08:03 )  Alb: 2.7 g/dL / Pro: x     / ALK PHOS: x     / ALT: x     / AST: x     / GGT: x             Culture - Blood in AM (11.22.21 @ 08:03)    Specimen Source: .Blood None    Culture Results:   No growth to date.          MEDICATIONS  (STANDING):  amLODIPine   Tablet 10 milliGRAM(s) Oral daily  artificial  tears Solution 1 Drop(s) Both EYES two times a day  atovaquone  Suspension 1500 milliGRAM(s) Oral daily  calcium acetate 1334 milliGRAM(s) Oral three times a day with meals  cloNIDine 0.1 milliGRAM(s) Oral daily  DAPTOmycin IVPB 450 milliGRAM(s) IV Intermittent every 48 hours  epoetin amee-epbx (RETACRIT) Injectable 47450 Unit(s) IV Push <User Schedule>  gabapentin 100 milliGRAM(s) Oral three times a day  heparin   Injectable 5000 Unit(s) SubCutaneous every 8 hours  hydroxychloroquine 200 milliGRAM(s) Oral daily  influenza   Vaccine 0.5 milliLiter(s) IntraMuscular once  pantoprazole    Tablet 40 milliGRAM(s) Oral before breakfast  predniSONE   Tablet 50 milliGRAM(s) Oral daily  senna 2 Tablet(s) Oral at bedtime    MEDICATIONS  (PRN):  acetaminophen     Tablet .. 650 milliGRAM(s) Oral every 6 hours PRN Mild Pain (1 - 3)  aluminum hydroxide/magnesium hydroxide/simethicone Suspension 30 milliLiter(s) Oral every 4 hours PRN Dyspepsia  loperamide 2 milliGRAM(s) Oral three times a day PRN Diarrhea  melatonin 3 milliGRAM(s) Oral at bedtime PRN Insomnia  ondansetron Injectable 4 milliGRAM(s) IV Push every 8 hours PRN Nausea and/or Vomiting  oxycodone    5 mG/acetaminophen 325 mG 2 Tablet(s) Oral every 6 hours PRN Severe Pain (7 - 10)                Assessment and Plan:   Assessment:  · Assessment	  40 y/o F presents with fever    1. MRSA sepsis / leuckocytosis   Suspected line sepsis  Dr Harrington consult appreciated  2D echo No endocarditis would defer GRAHAM for now.   Continue IV daptomycin as per Dr Harrington  Repeat blood culture form 11/22 no growth to date  temp shiley in place, plan for dialysis tomorrow and removal of temp cath     2. ESRD on HD   - Nephrology follow up - Dr. Pena appreciated  - had dialysis 3 days in a row sat sun mon. plan for one more session via temp cath 11/24 with removal of line after and line holiday in the interum  -HD as per renal, dialyzed yesterday via temp catheter    3. Hypokalemia-replaced     4. Sacral decubitus ulcer, wounds on the left lower extremities   - Wound care consult for lower extremity wounds      5. History of [place], bacteremia, COPD, depression, epilepsy, GERD, Raynaud's syndrome, heroin abuse, HTN, lupus, RA, sepsis, SLE, smoker, ESRD (initiated on HD 5 weeks ago with tunneled HD catheter placed 10/22/21)  - c/w home medications   - Counseled on smoking cessation, declined Nicotine patch     6. Anemia-chr due to ESRD  Transfused one unit PRBC  with HD    DVT ppx: Heparin 5,000 units Q8H subcutaneous daily (hold for PLT <50,000)  Code status: Full code      
40 y/o F with PMH ap[place], bacteremia, COPD, depression, epilepsy, GERD, Raynaud's syndrome, heroin abuse, HTN, lupus, RA, sepsis, SLE, smoker, ESRD (initiated on HD 5 weeks ago with tunneled HD catheter placed 10/22/21) presents for fevers over the last 3 days. Her highest temperature was 103.4F at home. She was taking Tylenol over the counter. She feels that her RA has been flaring up because she was been having difficulty moving her arms and legs. Reports headache, cough which is dry with minimal sputum production, soreness at the site of her Tunneled HD catheter, SOB in the mornings since starting HD 5 weeks ago, lower extremity swelling. Denies congestion, runny nose, sore throat, chest pain, palpitations, abdominal pain, N/V, diarrhea/constipation, burning on urination, urinary urgency/frequency. Of note, pt had a prior urine culture 10/11/2021 showed >100,000 of Enterobacter cloacae and a wound culture from 7/13/2020 which showed MRSA. She was supposed to go to HD today and called Dr. Pena's office who instructed her to come to the ER. Of note, pt has a history of bacteremia and had a tracheostomy tube placed and PEG.   ER course: -109. Labs: Hb 9.2, , BUN 67, Cr 5.92, Albumin 2.8, GFR 8. UA: total protein 510, protein/Cr ratio 10.6, protein 500, large blood, RBC 25-50, moderate bacteria, glucose 100. COVID negative. RSV negative. Influenza A and B negative.       11/17/21: Patient seen and examined. Catheter removed yesterday. Discussed with Dr Pena.  11/18/21: Possible shiley catheter placement tomorrow  11/19/21: Patient seen and examined. No new complaints.  11/20/21: Dialyzing today.   11/21/21: No new issues. Repeat blood cultures still positive for MRSA. Discussed with patient regarding management plan.   11/22: seen and examined after dialysis - no issues. no dyspnea or chest pain today.   11/23: laying in bed, no pain or discomfort today, no dyspnea. plan to remove shiley tomorrow after 1 round of dialysis   11/24: seen and examined after dilaysis, doing well. feels good today. awaiting abx. reports wanting to sign out AMA today to see family in from out of town.   Patient seen at bedside to further discuss plan of care. Discussed risks of leaving against medical advice, which includes worsening of current medical condition, up to and including death. Patient understands risks and still wishes to leave after antibiotics. Will continue to follow, RN to call if any changes.      Vital Signs Last 24 Hrs  T(C): 37.1 (24 Nov 2021 12:00), Max: 37.1 (24 Nov 2021 07:45)  T(F): 98.8 (24 Nov 2021 12:00), Max: 98.8 (24 Nov 2021 07:45)  HR: 85 (24 Nov 2021 12:10) (77 - 103)  BP: 145/96 (24 Nov 2021 12:10) (123/94 - 155/102)  BP(mean): --  RR: 18 (24 Nov 2021 12:10) (16 - 18)  SpO2: 100% (24 Nov 2021 07:18) (100% - 100%)    Physical Exam:       General: Awake and alert, cooperative with exam. No acute distress.   Skin: Warm, dry, and pink. Pale complexion.   Eyes: Pupils equal and reactive to light. Extraocular eye movements intact. No conjunctival injection, discharge, or scleral icterus.   HEENT: Atraumatic, normocephalic. Moist mucus membranes.   Chest wall: right chest wall shiley without surrounding erythema or drainage.   Cardiology: Normal S1, S2. Systolic ejection murmur. Regular rate and rhythm.   Respiratory: Decreased breath sounds at the bases bilaterally. No wheezes, rales, or rhonchi. Normal chest expansion.   Gastrointestinal: Positive bowel sounds. Soft, non-tender, non-distended. No guarding, rigidity, or rebound tenderness. No hepatosplenomegaly.   Buttocks: Sacral decubitus ulcer with eschar, no surrounding erythema or purulent drainage.   Musculoskeletal: Contracted, decreased ROM of upper extremities.   Extremities: 2+ pitting edema bilaterally. Dorsalis pedis pulses 2+ bilaterally. Wound on left lower extremity approximately 2 x 2 cm with purulent drainage, no surrounding erythema.   Neurological: A+Ox3 (person, place, and time). Cranial nerves 2-12 intact. Normal speech. No facial droop. No focal neurological deficits. 5/5 motor strength in all extremities.   Psychiatric: Normal affect. Normal mood.                                8.9    16.39 )-----------( 350      ( 24 Nov 2021 07:57 )             28.5     11-24    133<L>  |  101  |  90<H>  ----------------------------<  95  4.5   |  20<L>  |  5.64<H>    Ca    7.5<L>      24 Nov 2021 07:57  Phos  3.8     11-24  Mg     1.9     11-23    TPro  x   /  Alb  2.6<L>  /  TBili  x   /  DBili  x   /  AST  x   /  ALT  x   /  AlkPhos  x   11-24        LIVER FUNCTIONS - ( 24 Nov 2021 07:57 )  Alb: 2.6 g/dL / Pro: x     / ALK PHOS: x     / ALT: x     / AST: x     / GGT: x               Culture - Blood in AM (11.22.21 @ 08:03)    Specimen Source: .Blood None    Culture Results:   No growth to date.        MEDICATIONS  (STANDING):  amLODIPine   Tablet 10 milliGRAM(s) Oral daily  artificial  tears Solution 1 Drop(s) Both EYES two times a day  atovaquone  Suspension 1500 milliGRAM(s) Oral daily  chlorhexidine 4% Liquid 1 Application(s) Topical <User Schedule>  cloNIDine 0.1 milliGRAM(s) Oral daily  DAPTOmycin IVPB 450 milliGRAM(s) IV Intermittent every 48 hours  epoetin amee-epbx (RETACRIT) Injectable 95815 Unit(s) IV Push <User Schedule>  furosemide   Injectable 60 milliGRAM(s) IV Push daily  gabapentin 100 milliGRAM(s) Oral three times a day  heparin   Injectable 5000 Unit(s) SubCutaneous every 8 hours  hydroxychloroquine 200 milliGRAM(s) Oral daily  influenza   Vaccine 0.5 milliLiter(s) IntraMuscular once  pantoprazole    Tablet 40 milliGRAM(s) Oral before breakfast  predniSONE   Tablet 50 milliGRAM(s) Oral daily  senna 2 Tablet(s) Oral at bedtime          Assessment and Plan:   Assessment:  · Assessment	  40 y/o F presents with fever    1. MRSA sepsis / leuckocytosis   Suspected line sepsis  Dr Harrington consult appreciated  2D echo No endocarditis would defer GRAHAM for now.   Continue IV daptomycin as per Dr Harrington  Repeat blood culture form 11/22 no growth to date  s/p temp shiley cath in right chest wall.     2. ESRD on HD   - Nephrology follow up - Dr. Pena appreciated  - had dialysis 3 days in a row sat sun mon wednesday 11/24 with removal of line 11/24 and line holiday in the interum  -HD as per renal - plan for friday HD     3. Hypokalemia-replaced     4. Sacral decubitus ulcer, wounds on the left lower extremities   - Wound care consult for lower extremity wounds      5. History of [place], bacteremia, COPD, depression, epilepsy, GERD, Raynaud's syndrome, heroin abuse, HTN, lupus, RA, sepsis, SLE, smoker, ESRD (initiated on HD 5 weeks ago with tunneled HD catheter placed 10/22/21)  - c/w home medications   - Counseled on smoking cessation, declined Nicotine patch     6. Anemia-chr due to ESRD  Transfused one unit PRBC  with HD    DVT ppx: Heparin 5,000 units Q8H subcutaneous daily (hold for PLT <50,000)  Code status: Full code      
Patient is a 41y Female who reports no complaints   less leg swelling     MEDICATIONS  (STANDING):  amLODIPine   Tablet 10 milliGRAM(s) Oral daily  artificial  tears Solution 1 Drop(s) Both EYES two times a day  atovaquone  Suspension 1500 milliGRAM(s) Oral daily  calcium acetate 1334 milliGRAM(s) Oral three times a day with meals  cloNIDine 0.1 milliGRAM(s) Oral daily  DAPTOmycin IVPB 450 milliGRAM(s) IV Intermittent every 48 hours  epoetin amee-epbx (RETACRIT) Injectable 61821 Unit(s) IV Push <User Schedule>  gabapentin 100 milliGRAM(s) Oral three times a day  heparin   Injectable 5000 Unit(s) SubCutaneous every 8 hours  hydroxychloroquine 200 milliGRAM(s) Oral daily  influenza   Vaccine 0.5 milliLiter(s) IntraMuscular once  pantoprazole    Tablet 40 milliGRAM(s) Oral before breakfast  predniSONE   Tablet 50 milliGRAM(s) Oral daily  senna 2 Tablet(s) Oral at bedtime      Vital Signs Last 24 Hrs  T(C): 36.2 (2021 08:04), Max: 36.8 (2021 16:07)  T(F): 97.1 (2021 08:04), Max: 98.3 (2021 16:07)  HR: 94 (2021 08:04) (92 - 100)  BP: 152/96 (2021 08:04) (136/90 - 152/97)  BP(mean): --  RR: 16 (2021 08:04) (16 - 18)  SpO2: 97% (2021 08:04) (94% - 98%)    Daily     Daily Weight in k.1 (2021 06:02)        PHYSICAL EXAM:    Constitutional: NAD, frail, >>then stated age  HEENT: dry MM  dist   Cardiovascular: S1 and S2  Extremities: ++ peripheral edema  Neurological: A/O x 3  : No Salgado  Skin: No rashes  Access: marcy cath R, ecchy "scratch marks" medial to catheter      LABS:               139    |  103    |  82     ----------------------------<  91        2021 12:00  3.4     |  26     |  6.55     137    |  101    |  57     ----------------------------<  149       2021 11:35  3.0     |  25     |  5.54     132    |  96     |  38     ----------------------------<  190       2021 18:12  3.5     |  26     |  4.49     Ca    8.1        2021 12:00  Ca    8.1        2021 11:35    Phos  3.1       2021 12:00    Mg     2.2       2021 12:00    TPro  7.0    /  Alb  2.8    /  TBili  0.3    /        15 Nov 2021 14:48  DBili  x      /  AST  15     /  ALT  15     /  AlkPhos  82                               7.6    5.89  )-----------( 199      ( 2021 12:00 )             24.1                         7.6    4.98  )-----------( 161      ( 2021 11:35 )             24.8         Hepatitis C Virus S/CO Ratio: 0.23 S/CO [0.00 - 0.99] (11-15 @ 14:48)  Hepatitis C Virus Interpretation: Nonreact (11-15 @ 14:48)      Urine Studies:          RADIOLOGY & ADDITIONAL STUDIES:              
pt second visit and seen on HD   tolerating HD   uf goal 4 Liters   elevated legs   may need additional UF tomorrow for PUF    await cultures        
  Date of service: 11-18-21 @ 11:12      Patient lying in bed; awake, alert; wants door to be closed to her room; did not have blood cultures done this am?      ROS unable to obtain secondary to patient medical condition     MEDICATIONS  (STANDING):  amLODIPine   Tablet 10 milliGRAM(s) Oral daily  artificial  tears Solution 1 Drop(s) Both EYES two times a day  atovaquone  Suspension 1500 milliGRAM(s) Oral daily  calcium acetate 1334 milliGRAM(s) Oral three times a day with meals  cloNIDine 0.1 milliGRAM(s) Oral daily  DAPTOmycin IVPB 450 milliGRAM(s) IV Intermittent every 48 hours  epoetin amee-epbx (RETACRIT) Injectable 46635 Unit(s) IV Push <User Schedule>  gabapentin 100 milliGRAM(s) Oral three times a day  heparin   Injectable 5000 Unit(s) SubCutaneous every 8 hours  hydroxychloroquine 200 milliGRAM(s) Oral daily  influenza   Vaccine 0.5 milliLiter(s) IntraMuscular once  pantoprazole    Tablet 40 milliGRAM(s) Oral before breakfast  predniSONE   Tablet 50 milliGRAM(s) Oral daily  senna 2 Tablet(s) Oral at bedtime    MEDICATIONS  (PRN):  acetaminophen     Tablet .. 650 milliGRAM(s) Oral every 6 hours PRN Mild Pain (1 - 3)  aluminum hydroxide/magnesium hydroxide/simethicone Suspension 30 milliLiter(s) Oral every 4 hours PRN Dyspepsia  loperamide 2 milliGRAM(s) Oral three times a day PRN Diarrhea  melatonin 3 milliGRAM(s) Oral at bedtime PRN Insomnia  ondansetron Injectable 4 milliGRAM(s) IV Push every 8 hours PRN Nausea and/or Vomiting  oxycodone    5 mG/acetaminophen 325 mG 2 Tablet(s) Oral every 6 hours PRN Severe Pain (7 - 10)      Vital Signs Last 24 Hrs  T(C): 36.2 (18 Nov 2021 08:04), Max: 36.8 (17 Nov 2021 16:07)  T(F): 97.1 (18 Nov 2021 08:04), Max: 98.3 (17 Nov 2021 16:07)  HR: 94 (18 Nov 2021 08:04) (92 - 100)  BP: 152/96 (18 Nov 2021 08:04) (136/90 - 152/97)  BP(mean): --  RR: 16 (18 Nov 2021 08:04) (16 - 18)  SpO2: 97% (18 Nov 2021 08:04) (94% - 98%)        Physical Exam:        Physical Exam:          Constitutional: frail looking  HEENT: NC/AT, EOMI, PERRLA, conjunctivae clear; ears and nose atraumatic; pharynx clear  Neck: supple; thyroid not palpable  Back: no tenderness  Respiratory: respiratory effort normal; clear to auscultation  Cardiovascular: S1S2 regular, no murmurs  Abdomen: soft, not tender, not distended, positive BS; no liver or spleen organomegaly  Genitourinary: no suprapubic tenderness  Musculoskeletal: no muscle tenderness, no joint swelling or tenderness  Neurological/ Psychiatric: AxOx3, judgement and insight normal;  moving all extremities  Skin: no rashes; no palpable lesions; too numerous to count track marks; right upper chest dressing over removed tessio site    Labs: all available labs reviewed                     Labs:                         Labs:                        7.6    4.98  )-----------( 161      ( 17 Nov 2021 11:35 )             24.8     11-17    137  |  101  |  57<H>  ----------------------------<  149<H>  3.0<L>   |  25  |  5.54<H>    Ca    8.1<L>      17 Nov 2021 11:35             Cultures:       Culture - Blood (collected 11-15-21 @ 14:48)  Source: .Blood None  Gram Stain (11-16-21 @ 12:37):    Growth in aerobic bottle: Gram Positive Cocci in Clusters    Growth in anaerobic bottle: Gram Positive Cocci in Clusters  Final Report (11-18-21 @ 09:15):    Growth in aerobic and anaerobic bottles: Methicillin Resistant    Staphylococcus aureus    See previous culture [id number]    Culture - Blood (collected 11-15-21 @ 14:48)  Source: .Blood None  Gram Stain (11-16-21 @ 12:38):    Growth in aerobic bottle: Gram Positive Cocci in Clusters    Growth in anaerobic bottle: Gram Positive Cocci in Clusters  Final Report (11-18-21 @ 09:14):    Growth in aerobic and anaerobic bottles: Methicillin Resistant    Staphylococcus aureus    ***Blood Panel PCR results on this specimen are available    approximately 3 hours after the Gram stain result.***    Gram stain, PCR, and/or culture results may notalways    correspond due to difference in methodologies.    ************************************************************    This PCR assay was performed by multiplex PCR. This    Assay tests for 66 bacterial and resistance gene targets.    Please refer to the [facility] test directory    at [email or web address] for details.  Organism: Blood Culture PCR  Methicillin resistant Staphylococcus aureus (11-18-21 @ 09:14)  Organism: Methicillin resistant Staphylococcus aureus (11-18-21 @ 09:14)      -  Ampicillin/Sulbactam: R <=8/4      -  Cefazolin: R 8      -  Clindamycin: S <=0.25      -  Daptomycin: S 0.5      -  Erythromycin: R >4      -  Gentamicin: S <=1 Should not be used as monotherapy      -  Linezolid: S 2      -  Oxacillin: R >2      -  Penicillin: R >8      -  RIF- Rifampin: S <=1 Should not be used as monotherapy      -  Tetra/Doxy: S <=1      -  Trimethoprim/Sulfamethoxazole: S <=0.5/9.5      -  Vancomycin: S 2      Method Type: KRYSTEN  Organism: Blood Culture PCR (11-18-21 @ 09:14)      -  Methicillin resistant Staphylococcus aureus (MRSA): Detec      Method Type: PCR      C-Reactive Protein, Serum: 385 mg/L (11-15-21 @ 21:12)                Culture Results:   Growth in aerobic bottle: Gram Positive Cocci in Clusters  Growth in anaerobic bottle: Gram Positive Cocci in Clusters  ***Blood Panel PCR results on this specimen are available  approximately 3 hours after the Gram stain result.***  Gram stain, PCR, and/or culture results may not always  correspond due to difference in methodologies.  ************************************************************  This PCR assay was performed by multiplex PCR. This  Assay tests for 66 bacterial and resistance gene targets.  Please refer to the [facility] test directory  at [email or web address] for details. (11-15 @ 14:48)  Culture Results:   Growth in aerobic bottle: Gram Positive Cocci in Clusters  Growth in anaerobic bottle: Gram Positive Cocci in Clusters (11-15 @ 14:48)          < from: TTE Echo Complete w/o Contrast w/ Doppler (11.16.21 @ 18:43) >     Impression     Summary     The mitral valve leaflets appear normal; there is no evidence of stenosis,   fluttering or prolapse.   Mild (1+) mitral regurgitation is present.   Normal aortic valve structure and function.   The tricuspid valve leaflets are thin and pliable; valve motion is normal.   Mild (1+) tricuspid valve regurgitation is present.   Normal appearing pulmonic valve structure.   Trace pulmonic valvular regurgitation is present.   The left atrium appears normal.   Estimated left ventricular ejection fraction is 50-55 %.   Mild concentric left ventricular hypertrophy is present.   Pleural effusion cannot be ruled out.      < end of copied text >            Radiology: all available radiological tests reviewed    Advanced directives addressed: full resuscitation
  HPI:  42 y/o F with PMH MRSA bacteremia, COPD, depression, epilepsy, GERD, Raynaud's syndrome, heroin abuse, HTN, lupus, RA, sepsis, SLE, smoker, ESRD (initiated on HD 5 weeks ago with tunneled HD catheter placed 10/22/21) presents for fevers over the last 3 days. Her highest temperature was 103.4F at home. She was taking Tylenol over the counter. She feels that her RA has been flaring up because she was been having difficulty moving her arms and legs. Reports headache, cough which is dry with minimal sputum production, soreness at the site of her Tunneled HD catheter, SOB in the mornings since starting HD 5 weeks ago, lower extremity swelling. Denies congestion, runny nose, sore throat, chest pain, palpitations, abdominal pain, N/V, diarrhea/constipation, burning on urination, urinary urgency/frequency. Of note, pt had a prior urine culture 10/11/2021 showed >100,000 of Enterobacter cloacae and a wound culture from 7/13/2020 which showed MRSA. She was supposed to go to HD today and called Dr. Pena's office who instructed her to come to the ER. Of note, pt has a history of bacteremia and had a tracheostomy tube placed and PEG.     ER course: -109. Labs: Hb 9.2, , BUN 67, Cr 5.92, Albumin 2.8, GFR 8. UA: total protein 510, protein/Cr ratio 10.6, protein 500, large blood, RBC 25-50, moderate bacteria, glucose 100. COVID negative. RSV negative. Influenza A and B negative    Today    seen on HD 4 liters removal    pt tolerated HD Fri/Sat and today    now WBC rising,    afebrile   11/18 - BCx +    .   MEDICATIONS  (STANDING):  amLODIPine   Tablet 10 milliGRAM(s) Oral daily  artificial  tears Solution 1 Drop(s) Both EYES two times a day  atovaquone  Suspension 1500 milliGRAM(s) Oral daily  calcium acetate 1334 milliGRAM(s) Oral three times a day with meals  chlorhexidine 4% Liquid 1 Application(s) Topical <User Schedule>  cloNIDine 0.1 milliGRAM(s) Oral daily  DAPTOmycin IVPB 450 milliGRAM(s) IV Intermittent every 48 hours  epoetin amee-epbx (RETACRIT) Injectable 75576 Unit(s) IV Push <User Schedule>  furosemide   Injectable 80 milliGRAM(s) IV Push once  gabapentin 100 milliGRAM(s) Oral three times a day  heparin   Injectable 5000 Unit(s) SubCutaneous every 8 hours  hydroxychloroquine 200 milliGRAM(s) Oral daily  influenza   Vaccine 0.5 milliLiter(s) IntraMuscular once  pantoprazole    Tablet 40 milliGRAM(s) Oral before breakfast  predniSONE   Tablet 50 milliGRAM(s) Oral daily  senna 2 Tablet(s) Oral at bedtime      Vital Signs Last 24 Hrs  T(C): 36.3 (22 Nov 2021 21:19), Max: 37.2 (22 Nov 2021 13:18)  T(F): 97.4 (22 Nov 2021 21:19), Max: 99 (22 Nov 2021 13:18)  HR: 85 (22 Nov 2021 21:19) (84 - 109)  BP: 146/94 (22 Nov 2021 21:19) (128/86 - 176/127)  BP(mean): --  RR: 18 (22 Nov 2021 21:19) (17 - 18)  SpO2: 100% (22 Nov 2021 21:19) (100% - 100%)      I&O's Detail      PHYSICAL EXAM:  GENERAL: No distress. frail , appears >> than stated age   CHEST/LUNG: scattered rhonchi  HEART: S1S2   ABDOMEN: soft  EXTREMITIES:++   IJ catheter    LABS:               133    |  100    |  77     ----------------------------<  113       22 Nov 2021 08:03  3.7     |  20     |  5.00     133    |  99     |  59     ----------------------------<  206       20 Nov 2021 07:47  4.2     |  24     |  4.55     137    |  104    |  115    ----------------------------<  88        19 Nov 2021 09:30  4.2     |  23     |  7.35     Ca    8.0        22 Nov 2021 08:03  Ca    7.8        20 Nov 2021 07:47    Phos  2.3       22 Nov 2021 08:03  Phos  3.4       20 Nov 2021 07:47      TPro  x      /  Alb  2.7    /  TBili  x      /        22 Nov 2021 08:03  DBili  x      /  AST  x      /  ALT  x      /  AlkPhos  x        TPro  x      /  Alb  2.3    /  TBili  x      /        20 Nov 2021 07:47  DBili  x      /  AST  x      /  ALT  x      /  AlkPhos  x                                  10.7   16.13 )-----------( 374      ( 22 Nov 2021 08:03 )             33.5         RADIOLOGY & ADDITIONAL TESTS:  
42 y/o F with PMH aponia, bacteremia, COPD, depression, epilepsy, GERD, Raynaud's syndrome, heroin abuse, HTN, lupus, RA, sepsis, SLE, smoker, ESRD (initiated on HD 5 weeks ago with tunneled HD catheter placed 10/22/21) presents for fevers over the last 3 days. Her highest temperature was 103.4F at home. She was taking Tylenol over the counter. She feels that her RA has been flaring up because she was been having difficulty moving her arms and legs. Reports headache, cough which is dry with minimal sputum production, soreness at the site of her Tunneled HD catheter, SOB in the mornings since starting HD 5 weeks ago, lower extremity swelling. Denies congestion, runny nose, sore throat, chest pain, palpitations, abdominal pain, N/V, diarrhea/constipation, burning on urination, urinary urgency/frequency. Of note, pt had a prior urine culture 10/11/2021 showed >100,000 of Enterobacter cloacae and a wound culture from 7/13/2020 which showed MRSA. She was supposed to go to HD today and called Dr. Pena's office who instructed her to come to the ER. Of note, pt has a history of bacteremia and had a tracheostomy tube placed and PEG.   ER course: -109. Labs: Hb 9.2, , BUN 67, Cr 5.92, Albumin 2.8, GFR 8. UA: total protein 510, protein/Cr ratio 10.6, protein 500, large blood, RBC 25-50, moderate bacteria, glucose 100. COVID negative. RSV negative. Influenza A and B negative.       11/17/21: Patient seen and examined. Catheter removed yesterday. Discussed with Dr Pena.  11/18/21: Possible shiley catheter placement tomorrow  11/19/21: Patient seen and examined. No new complaints.  11/20/21: Dialyzing today.   11/21/21: No new issues. Repeat blood cultures still positive for MRSA. Discussed with patient regarding management plan.   11/22: seen and examined after dialysis - no issues. no dyspnea or chest pain today.       Vital Signs Last 24 Hrs  T(C): 37.1 (22 Nov 2021 15:06), Max: 37.2 (22 Nov 2021 13:18)  T(F): 98.7 (22 Nov 2021 15:06), Max: 99 (22 Nov 2021 13:18)  HR: 102 (22 Nov 2021 15:06) (90 - 109)  BP: 131/87 (22 Nov 2021 15:06) (128/86 - 176/127)  BP(mean): --  RR: 18 (22 Nov 2021 15:06) (17 - 18)  SpO2: 100% (22 Nov 2021 15:06) (100% - 100%)      Physical Exam:       General: Awake and alert, cooperative with exam. No acute distress.   Skin: Warm, dry, and pink. Pale complexion.   Eyes: Pupils equal and reactive to light. Extraocular eye movements intact. No conjunctival injection, discharge, or scleral icterus.   HEENT: Atraumatic, normocephalic. Moist mucus membranes.   Chest wall: Tunneled HD catheter without surrounding erythema or drainage.   Cardiology: Normal S1, S2. Systolic ejection murmur. Regular rate and rhythm.   Respiratory: Decreased breath sounds at the bases bilaterally. No wheezes, rales, or rhonchi. Normal chest expansion.   Gastrointestinal: Positive bowel sounds. Soft, non-tender, non-distended. No guarding, rigidity, or rebound tenderness. No hepatosplenomegaly.   Buttocks: Sacral decubitus ulcer with eschar, no surrounding erythema or purulent drainage.   Musculoskeletal: Contracted, decreased ROM of upper extremities.   Extremities: 2+ pitting edema bilaterally. Dorsalis pedis pulses 2+ bilaterally. Wound on left lower extremity approximately 2 x 2 cm with purulent drainage, no surrounding erythema.   Neurological: A+Ox3 (person, place, and time). Cranial nerves 2-12 intact. Normal speech. No facial droop. No focal neurological deficits. 5/5 motor strength in all extremities.   Psychiatric: Normal affect. Normal mood.                                10.7   16.13 )-----------( 374      ( 22 Nov 2021 08:03 )             33.5     11-22    133<L>  |  100  |  77<H>  ----------------------------<  113<H>  3.7   |  20<L>  |  5.00<H>    Ca    8.0<L>      22 Nov 2021 08:03  Phos  2.3     11-22    TPro  x   /  Alb  2.7<L>  /  TBili  x   /  DBili  x   /  AST  x   /  ALT  x   /  AlkPhos  x   11-22        LIVER FUNCTIONS - ( 22 Nov 2021 08:03 )  Alb: 2.7 g/dL / Pro: x     / ALK PHOS: x     / ALT: x     / AST: x     / GGT: x               MEDICATIONS  (STANDING):  amLODIPine   Tablet 10 milliGRAM(s) Oral daily  artificial  tears Solution 1 Drop(s) Both EYES two times a day  atovaquone  Suspension 1500 milliGRAM(s) Oral daily  calcium acetate 1334 milliGRAM(s) Oral three times a day with meals  cloNIDine 0.1 milliGRAM(s) Oral daily  DAPTOmycin IVPB 450 milliGRAM(s) IV Intermittent every 48 hours  epoetin amee-epbx (RETACRIT) Injectable 50291 Unit(s) IV Push <User Schedule>  gabapentin 100 milliGRAM(s) Oral three times a day  heparin   Injectable 5000 Unit(s) SubCutaneous every 8 hours  hydroxychloroquine 200 milliGRAM(s) Oral daily  influenza   Vaccine 0.5 milliLiter(s) IntraMuscular once  pantoprazole    Tablet 40 milliGRAM(s) Oral before breakfast  predniSONE   Tablet 50 milliGRAM(s) Oral daily  senna 2 Tablet(s) Oral at bedtime    MEDICATIONS  (PRN):  acetaminophen     Tablet .. 650 milliGRAM(s) Oral every 6 hours PRN Mild Pain (1 - 3)  aluminum hydroxide/magnesium hydroxide/simethicone Suspension 30 milliLiter(s) Oral every 4 hours PRN Dyspepsia  loperamide 2 milliGRAM(s) Oral three times a day PRN Diarrhea  melatonin 3 milliGRAM(s) Oral at bedtime PRN Insomnia  ondansetron Injectable 4 milliGRAM(s) IV Push every 8 hours PRN Nausea and/or Vomiting  oxycodone    5 mG/acetaminophen 325 mG 2 Tablet(s) Oral every 6 hours PRN Severe Pain (7 - 10)                Assessment and Plan:   Assessment:  · Assessment	  42 y/o F presents with fever    1. MRSA sepsis / leuckocytosis   Suspected line sepsis  Dr Harrington consult appreciated  2D echo No endocarditis would defer GRAHAM for now.   Continue IV daptomycin as per Dr Harrington  Repeat blood cultures still positive, repeats pending   temp shiley may continue to be source of infection, given increasing WBC, will consider pulling catheter and reinsert new line .     2. ESRD on HD   - Nephrology follow up - Dr. Pena appreciated  -HD as per renal, dialyzed yesterday via temp catheter    3. Hypokalemia-replaced     4. Sacral decubitus ulcer, wounds on the left lower extremities   - Wound care consult for lower extremity wounds      5. History of aponia, bacteremia, COPD, depression, epilepsy, GERD, Raynaud's syndrome, heroin abuse, HTN, lupus, RA, sepsis, SLE, smoker, ESRD (initiated on HD 5 weeks ago with tunneled HD catheter placed 10/22/21)  - c/w home medications   - Counseled on smoking cessation, declined Nicotine patch     6. Anemia-chr due to ESRD  Transfused one unit PRBC  with HD    DVT ppx: Heparin 5,000 units Q8H subcutaneous daily (hold for PLT <50,000)  Code status: Full code      
Date of service: 11-23-21 @ 13:30      Patient eating lunch; afebrile      ROS unable to obtain secondary to patient medical condition     MEDICATIONS  (STANDING):  amLODIPine   Tablet 10 milliGRAM(s) Oral daily  artificial  tears Solution 1 Drop(s) Both EYES two times a day  atovaquone  Suspension 1500 milliGRAM(s) Oral daily  chlorhexidine 4% Liquid 1 Application(s) Topical <User Schedule>  cloNIDine 0.1 milliGRAM(s) Oral daily  DAPTOmycin IVPB 450 milliGRAM(s) IV Intermittent every 48 hours  epoetin amee-epbx (RETACRIT) Injectable 96298 Unit(s) IV Push <User Schedule>  furosemide   Injectable 60 milliGRAM(s) IV Push daily  gabapentin 100 milliGRAM(s) Oral three times a day  heparin   Injectable 5000 Unit(s) SubCutaneous every 8 hours  hydroxychloroquine 200 milliGRAM(s) Oral daily  influenza   Vaccine 0.5 milliLiter(s) IntraMuscular once  pantoprazole    Tablet 40 milliGRAM(s) Oral before breakfast  predniSONE   Tablet 50 milliGRAM(s) Oral daily  senna 2 Tablet(s) Oral at bedtime    MEDICATIONS  (PRN):  acetaminophen     Tablet .. 650 milliGRAM(s) Oral every 6 hours PRN Mild Pain (1 - 3)  aluminum hydroxide/magnesium hydroxide/simethicone Suspension 30 milliLiter(s) Oral every 4 hours PRN Dyspepsia  loperamide 2 milliGRAM(s) Oral three times a day PRN Diarrhea  melatonin 3 milliGRAM(s) Oral at bedtime PRN Insomnia  ondansetron Injectable 4 milliGRAM(s) IV Push every 8 hours PRN Nausea and/or Vomiting  oxycodone    5 mG/acetaminophen 325 mG 2 Tablet(s) Oral every 4 hours PRN Severe Pain (7 - 10)  sodium chloride 0.9% lock flush 10 milliLiter(s) IV Push every 1 hour PRN Pre/post blood products, medications, blood draw, and to maintain line patency      Vital Signs Last 24 Hrs  T(C): 36.9 (23 Nov 2021 07:45), Max: 37.1 (22 Nov 2021 15:06)  T(F): 98.4 (23 Nov 2021 07:45), Max: 98.7 (22 Nov 2021 15:06)  HR: 80 (23 Nov 2021 07:45) (80 - 102)  BP: 130/90 (23 Nov 2021 07:45) (129/91 - 146/94)  BP(mean): --  RR: 18 (23 Nov 2021 07:45) (18 - 18)  SpO2: 100% (23 Nov 2021 07:45) (100% - 100%)        Physical Exam:        Constitutional: frail looking  HEENT: NC/AT, EOMI, PERRLA, conjunctivae clear; ears and nose atraumatic; pharynx clear  Neck: supple; thyroid not palpable  Back: no tenderness  Respiratory: respiratory effort normal; clear to auscultation  Cardiovascular: S1S2 regular, no murmurs  Abdomen: soft, not tender, not distended, positive BS; no liver or spleen organomegaly  Genitourinary: no suprapubic tenderness  Musculoskeletal: no muscle tenderness, no joint swelling or tenderness  Neurological/ Psychiatric: AxOx3, judgement and insight normal;  moving all extremities  Skin: no rashes; no palpable lesions; too numerous to count track marks; right upper chest dressing over removed tessio site    Labs: all available labs reviewed                     Labs:                        8.8    14.81 )-----------( 289      ( 23 Nov 2021 07:32 )             27.6     11-23    133<L>  |  102  |  54<H>  ----------------------------<  90  4.5   |  23  |  4.24<H>    Ca    7.7<L>      23 Nov 2021 07:32  Phos  3.1     11-23  Mg     1.9     11-23    TPro  x   /  Alb  2.7<L>  /  TBili  x   /  DBili  x   /  AST  x   /  ALT  x   /  AlkPhos  x   11-22           Cultures:       Culture - Blood (collected 11-22-21 @ 08:03)  Source: .Blood None  Preliminary Report (11-23-21 @ 13:01):    No growth to date.    Culture - Blood (collected 11-18-21 @ 12:00)  Source: .Blood Blood  Gram Stain (11-19-21 @ 14:03):    Growth in aerobic bottle: Gram Positive Cocci in Clusters    Growth in anaerobic bottle: Gram Positive Cocci in Clusters  Final Report (11-20-21 @ 13:46):    Growth in aerobic and anaerobic bottles: Staphylococcus aureus    See previous culture [id number]    Culture - Blood (collected 11-18-21 @ 12:00)  Source: .Blood Blood  Gram Stain (11-19-21 @ 20:03):    Growth in aerobic bottle:    Gram Positive Cocci in Clusters    Growth in anaerobic bottle: Gram Positive Cocci in Clusters  Final Report (11-20-21 @ 23:03):    Growth in aerobic and anaerobic bottles: Methicillin Resistant    Staphylococcus aureus    See previous culture [id number]      C-Reactive Protein, Serum: 385 mg/L (11-15-21 @ 21:12)          < from: TTE Echo Complete w/o Contrast w/ Doppler (11.16.21 @ 18:43) >     Impression     Summary     The mitral valve leaflets appear normal; there is no evidence of stenosis,   fluttering or prolapse.   Mild (1+) mitral regurgitation is present.   Normal aortic valve structure and function.   The tricuspid valve leaflets are thin and pliable; valve motion is normal.   Mild (1+) tricuspid valve regurgitation is present.   Normal appearing pulmonic valve structure.   Trace pulmonic valvular regurgitation is present.   The left atrium appears normal.   Estimated left ventricular ejection fraction is 50-55 %.   Mild concentric left ventricular hypertrophy is present.   Pleural effusion cannot be ruled out.      < end of copied text >            Radiology: all available radiological tests reviewed    Advanced directives addressed: full resuscitation
Patient is a 41y Female who reports no complaints as new      MEDICATIONS  (STANDING):  amLODIPine   Tablet 10 milliGRAM(s) Oral daily  artificial  tears Solution 1 Drop(s) Both EYES two times a day  atovaquone  Suspension 1500 milliGRAM(s) Oral daily  chlorhexidine 4% Liquid 1 Application(s) Topical <User Schedule>  cloNIDine 0.1 milliGRAM(s) Oral daily  DAPTOmycin IVPB 450 milliGRAM(s) IV Intermittent every 48 hours  epoetin amee-epbx (RETACRIT) Injectable 93849 Unit(s) IV Push <User Schedule>  furosemide   Injectable 60 milliGRAM(s) IV Push daily  gabapentin 100 milliGRAM(s) Oral three times a day  heparin   Injectable 5000 Unit(s) SubCutaneous every 8 hours  hydroxychloroquine 200 milliGRAM(s) Oral daily  influenza   Vaccine 0.5 milliLiter(s) IntraMuscular once  pantoprazole    Tablet 40 milliGRAM(s) Oral before breakfast  predniSONE   Tablet 50 milliGRAM(s) Oral daily  senna 2 Tablet(s) Oral at bedtime    MEDICATIONS  (PRN):  acetaminophen     Tablet .. 650 milliGRAM(s) Oral every 6 hours PRN Mild Pain (1 - 3)  aluminum hydroxide/magnesium hydroxide/simethicone Suspension 30 milliLiter(s) Oral every 4 hours PRN Dyspepsia  loperamide 2 milliGRAM(s) Oral three times a day PRN Diarrhea  melatonin 3 milliGRAM(s) Oral at bedtime PRN Insomnia  ondansetron Injectable 4 milliGRAM(s) IV Push every 8 hours PRN Nausea and/or Vomiting  oxycodone    5 mG/acetaminophen 325 mG 2 Tablet(s) Oral every 4 hours PRN Severe Pain (7 - 10)  sodium chloride 0.9% lock flush 10 milliLiter(s) IV Push every 1 hour PRN Pre/post blood products, medications, blood draw, and to maintain line patency        T(C): , Max: 37.2 (11-22-21 @ 13:18)  T(F): , Max: 99 (11-22-21 @ 13:18)  HR: 80 (11-23-21 @ 07:45)  BP: 130/90 (11-23-21 @ 07:45)  BP(mean): --  RR: 18 (11-23-21 @ 07:45)  SpO2: 100% (11-23-21 @ 07:45)  Wt(kg): --        PHYSICAL EXAM:    Constitutional: NAD, frail. >.stated age  HEENT: PERRLA, EOMI,  MMM  Neck: No LAD, No JVD  Respiratory: dist  Cardiovascular: S1 and S2  Extremities: No peripheral edema  Neurological: A/O x 3  temp cath R        LABS:                        8.8    14.81 )-----------( 289      ( 23 Nov 2021 07:32 )             27.6     23 Nov 2021 07:32    133    |  102    |  54     ----------------------------<  90     4.5     |  23     |  4.24   22 Nov 2021 08:03    133    |  100    |  77     ----------------------------<  113    3.7     |  20     |  5.00   20 Nov 2021 07:47    133    |  99     |  59     ----------------------------<  206    4.2     |  24     |  4.55     Ca    7.7        23 Nov 2021 07:32  Ca    8.0        22 Nov 2021 08:03  Ca    7.8        20 Nov 2021 07:47  Phos  3.1       23 Nov 2021 07:32  Phos  2.3       22 Nov 2021 08:03  Phos  3.4       20 Nov 2021 07:47  Mg     1.9       23 Nov 2021 07:32    TPro  x      /  Alb  2.7    /  TBili  x      /  DBili  x      /  AST  x      /  ALT  x      /  AlkPhos  x      22 Nov 2021 08:03  TPro  x      /  Alb  2.3    /  TBili  x      /  DBili  x      /  AST  x      /  ALT  x      /  AlkPhos  x      20 Nov 2021 07:47          Urine Studies:          RADIOLOGY & ADDITIONAL STUDIES:              
Date of service: 11-19-21 @ 11:13    Patient lying in bed; noted with persistent bacteria in blood cultures      ROS unable to obtain secondary to patient medical condition     MEDICATIONS  (STANDING):  amLODIPine   Tablet 10 milliGRAM(s) Oral daily  artificial  tears Solution 1 Drop(s) Both EYES two times a day  atovaquone  Suspension 1500 milliGRAM(s) Oral daily  calcium acetate 1334 milliGRAM(s) Oral three times a day with meals  cloNIDine 0.1 milliGRAM(s) Oral daily  DAPTOmycin IVPB 450 milliGRAM(s) IV Intermittent every 48 hours  epoetin amee-epbx (RETACRIT) Injectable 74377 Unit(s) IV Push <User Schedule>  gabapentin 100 milliGRAM(s) Oral three times a day  heparin   Injectable 5000 Unit(s) SubCutaneous every 8 hours  hydroxychloroquine 200 milliGRAM(s) Oral daily  influenza   Vaccine 0.5 milliLiter(s) IntraMuscular once  pantoprazole    Tablet 40 milliGRAM(s) Oral before breakfast  predniSONE   Tablet 50 milliGRAM(s) Oral daily  senna 2 Tablet(s) Oral at bedtime    MEDICATIONS  (PRN):  acetaminophen     Tablet .. 650 milliGRAM(s) Oral every 6 hours PRN Mild Pain (1 - 3)  aluminum hydroxide/magnesium hydroxide/simethicone Suspension 30 milliLiter(s) Oral every 4 hours PRN Dyspepsia  loperamide 2 milliGRAM(s) Oral three times a day PRN Diarrhea  melatonin 3 milliGRAM(s) Oral at bedtime PRN Insomnia  ondansetron Injectable 4 milliGRAM(s) IV Push every 8 hours PRN Nausea and/or Vomiting  oxycodone    5 mG/acetaminophen 325 mG 2 Tablet(s) Oral every 6 hours PRN Severe Pain (7 - 10)      Vital Signs Last 24 Hrs  T(C): 37 (19 Nov 2021 07:57), Max: 37 (18 Nov 2021 15:31)  T(F): 98.6 (19 Nov 2021 07:57), Max: 98.6 (18 Nov 2021 15:31)  HR: 93 (19 Nov 2021 11:11) (92 - 106)  BP: 149/98 (19 Nov 2021 11:11) (147/94 - 159/101)  BP(mean): --  RR: 18 (19 Nov 2021 07:57) (18 - 18)  SpO2: 99% (19 Nov 2021 07:57) (97% - 99%)        Physical Exam:        Constitutional: frail looking  HEENT: NC/AT, EOMI, PERRLA, conjunctivae clear; ears and nose atraumatic; pharynx clear  Neck: supple; thyroid not palpable  Back: no tenderness  Respiratory: respiratory effort normal; clear to auscultation  Cardiovascular: S1S2 regular, no murmurs  Abdomen: soft, not tender, not distended, positive BS; no liver or spleen organomegaly  Genitourinary: no suprapubic tenderness  Musculoskeletal: no muscle tenderness, no joint swelling or tenderness  Neurological/ Psychiatric: AxOx3, judgement and insight normal;  moving all extremities  Skin: no rashes; no palpable lesions; too numerous to count track marks; right upper chest dressing over removed tessio site    Labs: all available labs reviewed                         Labs:                        7.2    6.33  )-----------( 210      ( 19 Nov 2021 09:30 )             23.9     11-19    137  |  104  |  115<H>  ----------------------------<  88  4.2   |  23  |  7.35<H>    Ca    7.9<L>      19 Nov 2021 09:30  Phos  3.1     11-18  Mg     2.2     11-18             Cultures:       Culture - Blood (collected 11-18-21 @ 12:00)  Source: .Blood Blood  Gram Stain (11-19-21 @ 09:59):    Growth in aerobic bottle: Gram Positive Cocci in Clusters  Preliminary Report (11-19-21 @ 09:59):    Growth in aerobic bottle: Gram Positive Cocci in Clusters    Culture - Blood (collected 11-18-21 @ 12:00)  Source: .Blood Blood  Gram Stain (11-19-21 @ 09:00):    Growth in aerobic bottle:    Gram Positive Cocci in Clusters  Preliminary Report (11-19-21 @ 09:00):    Growth in aerobic bottle:    Gram Positive Cocci in Clusters    Culture - Blood (collected 11-15-21 @ 14:48)  Source: .Blood None  Gram Stain (11-16-21 @ 12:37):    Growth in aerobic bottle: Gram Positive Cocci in Clusters    Growth in anaerobic bottle: Gram Positive Cocci in Clusters  Final Report (11-18-21 @ 09:15):    Growth in aerobic and anaerobic bottles: Methicillin Resistant    Staphylococcus aureus    See previous culture [id number]    Culture - Blood (collected 11-15-21 @ 14:48)  Source: .Blood None  Gram Stain (11-16-21 @ 12:38):    Growth in aerobic bottle: Gram Positive Cocci in Clusters    Growth in anaerobic bottle: Gram Positive Cocci in Clusters  Final Report (11-18-21 @ 09:14):    Growth in aerobic and anaerobic bottles: Methicillin Resistant    Staphylococcus aureus    ***Blood Panel PCR results on this specimen are available    approximately 3 hours after the Gram stain result.***    Gram stain, PCR, and/or culture results may notalways    correspond due to difference in methodologies.    ************************************************************    This PCR assay was performed by multiplex PCR. This    Assay tests for 66 bacterial and resistance gene targets.    Please refer to the [facility] test directory    at [email or web address] for details.  Organism: Blood Culture PCR  Methicillin resistant Staphylococcus aureus (11-18-21 @ 09:14)  Organism: Methicillin resistant Staphylococcus aureus (11-18-21 @ 09:14)      -  Ampicillin/Sulbactam: R <=8/4      -  Cefazolin: R 8      -  Clindamycin: S <=0.25      -  Daptomycin: S 0.5      -  Erythromycin: R >4      -  Gentamicin: S <=1 Should not be used as monotherapy      -  Linezolid: S 2      -  Oxacillin: R >2      -  Penicillin: R >8      -  RIF- Rifampin: S <=1 Should not be used as monotherapy      -  Tetra/Doxy: S <=1      -  Trimethoprim/Sulfamethoxazole: S <=0.5/9.5      -  Vancomycin: S 2      Method Type: KRYSTEN  Organism: Blood Culture PCR (11-18-21 @ 09:14)      -  Methicillin resistant Staphylococcus aureus (MRSA): Detec      Method Type: PCR      C-Reactive Protein, Serum: 385 mg/L (11-15-21 @ 21:12)                < from: TTE Echo Complete w/o Contrast w/ Doppler (11.16.21 @ 18:43) >     Impression     Summary     The mitral valve leaflets appear normal; there is no evidence of stenosis,   fluttering or prolapse.   Mild (1+) mitral regurgitation is present.   Normal aortic valve structure and function.   The tricuspid valve leaflets are thin and pliable; valve motion is normal.   Mild (1+) tricuspid valve regurgitation is present.   Normal appearing pulmonic valve structure.   Trace pulmonic valvular regurgitation is present.   The left atrium appears normal.   Estimated left ventricular ejection fraction is 50-55 %.   Mild concentric left ventricular hypertrophy is present.   Pleural effusion cannot be ruled out.      < end of copied text >            Radiology: all available radiological tests reviewed    Advanced directives addressed: full resuscitation
Date of service: 11-22-21 @ 13:59    Patient lying in bed; just had dialysis; has the temporary shiley still        ROS unable to obtain secondary to patient medical condition     MEDICATIONS  (STANDING):  amLODIPine   Tablet 10 milliGRAM(s) Oral daily  artificial  tears Solution 1 Drop(s) Both EYES two times a day  atovaquone  Suspension 1500 milliGRAM(s) Oral daily  calcium acetate 1334 milliGRAM(s) Oral three times a day with meals  chlorhexidine 4% Liquid 1 Application(s) Topical <User Schedule>  cloNIDine 0.1 milliGRAM(s) Oral daily  DAPTOmycin IVPB 450 milliGRAM(s) IV Intermittent every 48 hours  epoetin amee-epbx (RETACRIT) Injectable 43018 Unit(s) IV Push <User Schedule>  furosemide   Injectable 80 milliGRAM(s) IV Push once  gabapentin 100 milliGRAM(s) Oral three times a day  heparin   Injectable 5000 Unit(s) SubCutaneous every 8 hours  hydroxychloroquine 200 milliGRAM(s) Oral daily  influenza   Vaccine 0.5 milliLiter(s) IntraMuscular once  pantoprazole    Tablet 40 milliGRAM(s) Oral before breakfast  predniSONE   Tablet 50 milliGRAM(s) Oral daily  senna 2 Tablet(s) Oral at bedtime    MEDICATIONS  (PRN):  acetaminophen     Tablet .. 650 milliGRAM(s) Oral every 6 hours PRN Mild Pain (1 - 3)  aluminum hydroxide/magnesium hydroxide/simethicone Suspension 30 milliLiter(s) Oral every 4 hours PRN Dyspepsia  loperamide 2 milliGRAM(s) Oral three times a day PRN Diarrhea  melatonin 3 milliGRAM(s) Oral at bedtime PRN Insomnia  ondansetron Injectable 4 milliGRAM(s) IV Push every 8 hours PRN Nausea and/or Vomiting  oxycodone    5 mG/acetaminophen 325 mG 2 Tablet(s) Oral every 4 hours PRN Severe Pain (7 - 10)  sodium chloride 0.9% lock flush 10 milliLiter(s) IV Push every 1 hour PRN Pre/post blood products, medications, blood draw, and to maintain line patency      Vital Signs Last 24 Hrs  T(C): 37.2 (22 Nov 2021 13:18), Max: 37.2 (22 Nov 2021 13:18)  T(F): 99 (22 Nov 2021 13:18), Max: 99 (22 Nov 2021 13:18)  HR: 107 (22 Nov 2021 13:18) (90 - 109)  BP: 161/110 (22 Nov 2021 13:18) (128/86 - 176/127)  BP(mean): --  RR: 18 (22 Nov 2021 13:18) (17 - 18)  SpO2: 100% (22 Nov 2021 13:18) (100% - 100%)        Physical Exam:        Constitutional: frail looking  HEENT: NC/AT, EOMI, PERRLA, conjunctivae clear; ears and nose atraumatic; pharynx clear  Neck: supple; thyroid not palpable  Back: no tenderness  Respiratory: respiratory effort normal; clear to auscultation  Cardiovascular: S1S2 regular, no murmurs  Abdomen: soft, not tender, not distended, positive BS; no liver or spleen organomegaly  Genitourinary: no suprapubic tenderness  Musculoskeletal: no muscle tenderness, no joint swelling or tenderness  Neurological/ Psychiatric: AxOx3, judgement and insight normal;  moving all extremities  Skin: no rashes; no palpable lesions; too numerous to count track marks; right upper chest dressing over removed tessio site    Labs: all available labs reviewed                         Labs:                        10.7   16.13 )-----------( 374      ( 22 Nov 2021 08:03 )             33.5     11-22    133<L>  |  100  |  77<H>  ----------------------------<  113<H>  3.7   |  20<L>  |  5.00<H>    Ca    8.0<L>      22 Nov 2021 08:03  Phos  2.3     11-22    TPro  x   /  Alb  2.7<L>  /  TBili  x   /  DBili  x   /  AST  x   /  ALT  x   /  AlkPhos  x   11-22           Cultures:       Culture - Blood (collected 11-18-21 @ 12:00)  Source: .Blood Blood  Gram Stain (11-19-21 @ 14:03):    Growth in aerobic bottle: Gram Positive Cocci in Clusters    Growth in anaerobic bottle: Gram Positive Cocci in Clusters  Final Report (11-20-21 @ 13:46):    Growth in aerobic and anaerobic bottles: Staphylococcus aureus    See previous culture [id number]    Culture - Blood (collected 11-18-21 @ 12:00)  Source: .Blood Blood  Gram Stain (11-19-21 @ 20:03):    Growth in aerobic bottle:    Gram Positive Cocci in Clusters    Growth in anaerobic bottle: Gram Positive Cocci in Clusters  Final Report (11-20-21 @ 23:03):    Growth in aerobic and anaerobic bottles: Methicillin Resistant    Staphylococcus aureus    See previous culture [id number]    Culture - Blood (collected 11-15-21 @ 14:48)  Source: .Blood None  Gram Stain (11-16-21 @ 12:37):    Growth in aerobic bottle: Gram Positive Cocci in Clusters    Growth in anaerobic bottle: Gram Positive Cocci in Clusters  Final Report (11-18-21 @ 09:15):    Growth in aerobic and anaerobic bottles: Methicillin Resistant    Staphylococcus aureus    See previous culture [id number]    Culture - Blood (collected 11-15-21 @ 14:48)  Source: .Blood None  Gram Stain (11-16-21 @ 12:38):    Growth in aerobic bottle: Gram Positive Cocci in Clusters    Growth in anaerobic bottle: Gram Positive Cocci in Clusters  Final Report (11-18-21 @ 09:14):    Growth in aerobic and anaerobic bottles: Methicillin Resistant    Staphylococcus aureus    ***Blood Panel PCR results on this specimen are available    approximately 3 hours after the Gram stain result.***    Gram stain, PCR, and/or culture results may notalways    correspond due to difference in methodologies.    ************************************************************    This PCR assay was performed by multiplex PCR. This    Assay tests for 66 bacterial and resistance gene targets.    Please refer to the [facility] test directory    at [email or web address] for details.  Organism: Blood Culture PCR  Methicillin resistant Staphylococcus aureus (11-18-21 @ 09:14)  Organism: Methicillin resistant Staphylococcus aureus (11-18-21 @ 09:14)      -  Ampicillin/Sulbactam: R <=8/4      -  Cefazolin: R 8      -  Clindamycin: S <=0.25      -  Daptomycin: S 0.5      -  Erythromycin: R >4      -  Gentamicin: S <=1 Should not be used as monotherapy      -  Linezolid: S 2      -  Oxacillin: R >2      -  Penicillin: R >8      -  RIF- Rifampin: S <=1 Should not be used as monotherapy      -  Tetra/Doxy: S <=1      -  Trimethoprim/Sulfamethoxazole: S <=0.5/9.5      -  Vancomycin: S 2      Method Type: KRYSTEN  Organism: Blood Culture PCR (11-18-21 @ 09:14)      -  Methicillin resistant Staphylococcus aureus (MRSA): Detec      Method Type: PCR      C-Reactive Protein, Serum: 385 mg/L (11-15-21 @ 21:12)            < from: TTE Echo Complete w/o Contrast w/ Doppler (11.16.21 @ 18:43) >     Impression     Summary     The mitral valve leaflets appear normal; there is no evidence of stenosis,   fluttering or prolapse.   Mild (1+) mitral regurgitation is present.   Normal aortic valve structure and function.   The tricuspid valve leaflets are thin and pliable; valve motion is normal.   Mild (1+) tricuspid valve regurgitation is present.   Normal appearing pulmonic valve structure.   Trace pulmonic valvular regurgitation is present.   The left atrium appears normal.   Estimated left ventricular ejection fraction is 50-55 %.   Mild concentric left ventricular hypertrophy is present.   Pleural effusion cannot be ruled out.      < end of copied text >            Radiology: all available radiological tests reviewed    Advanced directives addressed: full resuscitation

## 2021-11-24 NOTE — PROGRESS NOTE ADULT - ASSESSMENT
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    NATALIA on CKD with HD dependence - now with MRSA bacteremia    HD today and complete now as ordered, patient is planning on AMA for holiday which i advised against   will need IR for temp cath removal   re-insertion of TDC once cleared by ID     Anemia   s/p PRBC with HD   TAMARA w HD     HTN  -Oral meds    ANCA Vasculitis  -Still on prednisone   rheum followup     d/c with Rn staff, HD staff

## 2021-11-27 LAB
CULTURE RESULTS: SIGNIFICANT CHANGE UP
CULTURE RESULTS: SIGNIFICANT CHANGE UP
SPECIMEN SOURCE: SIGNIFICANT CHANGE UP
SPECIMEN SOURCE: SIGNIFICANT CHANGE UP

## 2021-11-29 ENCOUNTER — INPATIENT (INPATIENT)
Facility: HOSPITAL | Age: 41
LOS: 9 days | Discharge: HOME | DRG: 720 | End: 2021-12-09
Attending: STUDENT IN AN ORGANIZED HEALTH CARE EDUCATION/TRAINING PROGRAM | Admitting: STUDENT IN AN ORGANIZED HEALTH CARE EDUCATION/TRAINING PROGRAM
Payer: MEDICAID

## 2021-11-29 VITALS — HEIGHT: 65 IN | WEIGHT: 115.08 LBS

## 2021-11-29 DIAGNOSIS — Z87.19 PERSONAL HISTORY OF OTHER DISEASES OF THE DIGESTIVE SYSTEM: Chronic | ICD-10-CM

## 2021-11-29 DIAGNOSIS — Z98.51 TUBAL LIGATION STATUS: Chronic | ICD-10-CM

## 2021-11-29 DIAGNOSIS — K80.20 CALCULUS OF GALLBLADDER WITHOUT CHOLECYSTITIS WITHOUT OBSTRUCTION: Chronic | ICD-10-CM

## 2021-11-29 PROCEDURE — 99285 EMERGENCY DEPT VISIT HI MDM: CPT

## 2021-11-29 PROCEDURE — 71045 X-RAY EXAM CHEST 1 VIEW: CPT | Mod: 26

## 2021-11-29 PROCEDURE — 93010 ELECTROCARDIOGRAM REPORT: CPT

## 2021-11-29 NOTE — ED PROVIDER NOTE - OBJECTIVE STATEMENT
42 y/o female in ED c/o needing port placed for HD.   pt states was in HH last week for infection and HD but left AMA to spend time with family during Thanksgiving.   pt states has not had HD since 11/24/21 when she left AMA.    states still not feeling well and had fever of 103 yesterday.   pt denies any HA, cp, sob, n/v/d/abd pain.   states diffuse myalgia.   no sick contacts or recent travel.   tolerating PO.    pt states contacted Dr Yousif and advised to return to ED for port placement and HD

## 2021-11-29 NOTE — ED ADULT TRIAGE NOTE - CHIEF COMPLAINT QUOTE
Pt comes ot the ED complaining of not feeling well. Pt states that she had a fever last night of 103.6 for which she has been taking tylenol for. Pt states that she is feeling better this evening but that she requires dialysis. Pt states that she is to have dialysis M/W/F and that she last had her dialysis on Wednesday.

## 2021-11-30 DIAGNOSIS — E43 UNSPECIFIED SEVERE PROTEIN-CALORIE MALNUTRITION: ICD-10-CM

## 2021-11-30 DIAGNOSIS — N18.6 END STAGE RENAL DISEASE: ICD-10-CM

## 2021-11-30 DIAGNOSIS — A41.02 SEPSIS DUE TO METHICILLIN RESISTANT STAPHYLOCOCCUS AUREUS: ICD-10-CM

## 2021-11-30 DIAGNOSIS — K21.9 GASTRO-ESOPHAGEAL REFLUX DISEASE WITHOUT ESOPHAGITIS: ICD-10-CM

## 2021-11-30 DIAGNOSIS — L89.159 PRESSURE ULCER OF SACRAL REGION, UNSPECIFIED STAGE: ICD-10-CM

## 2021-11-30 DIAGNOSIS — I77.89 OTHER SPECIFIED DISORDERS OF ARTERIES AND ARTERIOLES: ICD-10-CM

## 2021-11-30 DIAGNOSIS — E83.39 OTHER DISORDERS OF PHOSPHORUS METABOLISM: ICD-10-CM

## 2021-11-30 DIAGNOSIS — J44.9 CHRONIC OBSTRUCTIVE PULMONARY DISEASE, UNSPECIFIED: ICD-10-CM

## 2021-11-30 DIAGNOSIS — Z88.8 ALLERGY STATUS TO OTHER DRUGS, MEDICAMENTS AND BIOLOGICAL SUBSTANCES STATUS: ICD-10-CM

## 2021-11-30 DIAGNOSIS — D69.6 THROMBOCYTOPENIA, UNSPECIFIED: ICD-10-CM

## 2021-11-30 DIAGNOSIS — M06.9 RHEUMATOID ARTHRITIS, UNSPECIFIED: ICD-10-CM

## 2021-11-30 DIAGNOSIS — G40.909 EPILEPSY, UNSPECIFIED, NOT INTRACTABLE, WITHOUT STATUS EPILEPTICUS: ICD-10-CM

## 2021-11-30 DIAGNOSIS — M32.9 SYSTEMIC LUPUS ERYTHEMATOSUS, UNSPECIFIED: ICD-10-CM
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
## 2021-11-30 DIAGNOSIS — E88.09 OTHER DISORDERS OF PLASMA-PROTEIN METABOLISM, NOT ELSEWHERE CLASSIFIED: ICD-10-CM

## 2021-11-30 DIAGNOSIS — T80.211A BLOODSTREAM INFECTION DUE TO CENTRAL VENOUS CATHETER, INITIAL ENCOUNTER: ICD-10-CM

## 2021-11-30 DIAGNOSIS — E87.6 HYPOKALEMIA: ICD-10-CM

## 2021-11-30 DIAGNOSIS — D63.1 ANEMIA IN CHRONIC KIDNEY DISEASE: ICD-10-CM

## 2021-11-30 DIAGNOSIS — F17.210 NICOTINE DEPENDENCE, CIGARETTES, UNCOMPLICATED: ICD-10-CM

## 2021-11-30 DIAGNOSIS — Z79.52 LONG TERM (CURRENT) USE OF SYSTEMIC STEROIDS: ICD-10-CM

## 2021-11-30 DIAGNOSIS — F11.10 OPIOID ABUSE, UNCOMPLICATED: ICD-10-CM

## 2021-11-30 DIAGNOSIS — Z99.2 DEPENDENCE ON RENAL DIALYSIS: ICD-10-CM

## 2021-11-30 LAB
ALBUMIN SERPL ELPH-MCNC: 3 G/DL — LOW (ref 3.3–5)
ALBUMIN SERPL ELPH-MCNC: 3.2 G/DL — LOW (ref 3.3–5)
ALP SERPL-CCNC: 78 U/L — SIGNIFICANT CHANGE UP (ref 40–120)
ALT FLD-CCNC: 23 U/L — SIGNIFICANT CHANGE UP (ref 12–78)
ANION GAP SERPL CALC-SCNC: 15 MMOL/L — SIGNIFICANT CHANGE UP (ref 5–17)
ANION GAP SERPL CALC-SCNC: 15 MMOL/L — SIGNIFICANT CHANGE UP (ref 5–17)
ANION GAP SERPL CALC-SCNC: 16 MMOL/L — SIGNIFICANT CHANGE UP (ref 5–17)
AST SERPL-CCNC: 11 U/L — LOW (ref 15–37)
BASOPHILS # BLD AUTO: 0.01 K/UL — SIGNIFICANT CHANGE UP (ref 0–0.2)
BASOPHILS NFR BLD AUTO: 0.1 % — SIGNIFICANT CHANGE UP (ref 0–2)
BILIRUB SERPL-MCNC: 0.3 MG/DL — SIGNIFICANT CHANGE UP (ref 0.2–1.2)
BUN SERPL-MCNC: 158 MG/DL — HIGH (ref 7–23)
BUN SERPL-MCNC: 159 MG/DL — HIGH (ref 7–23)
BUN SERPL-MCNC: 165 MG/DL — HIGH (ref 7–23)
CALCIUM SERPL-MCNC: 7.4 MG/DL — LOW (ref 8.5–10.1)
CALCIUM SERPL-MCNC: 7.5 MG/DL — LOW (ref 8.5–10.1)
CALCIUM SERPL-MCNC: 7.6 MG/DL — LOW (ref 8.5–10.1)
CHLORIDE SERPL-SCNC: 105 MMOL/L — SIGNIFICANT CHANGE UP (ref 96–108)
CO2 SERPL-SCNC: 14 MMOL/L — LOW (ref 22–31)
CO2 SERPL-SCNC: 14 MMOL/L — LOW (ref 22–31)
CO2 SERPL-SCNC: 16 MMOL/L — LOW (ref 22–31)
CREAT SERPL-MCNC: 7.59 MG/DL — HIGH (ref 0.5–1.3)
CREAT SERPL-MCNC: 7.62 MG/DL — HIGH (ref 0.5–1.3)
CREAT SERPL-MCNC: 7.65 MG/DL — HIGH (ref 0.5–1.3)
EOSINOPHIL # BLD AUTO: 0 K/UL — SIGNIFICANT CHANGE UP (ref 0–0.5)
EOSINOPHIL NFR BLD AUTO: 0 % — SIGNIFICANT CHANGE UP (ref 0–6)
GLUCOSE BLDC GLUCOMTR-MCNC: 90 MG/DL — SIGNIFICANT CHANGE UP (ref 70–99)
GLUCOSE SERPL-MCNC: 119 MG/DL — HIGH (ref 70–99)
GLUCOSE SERPL-MCNC: 243 MG/DL — HIGH (ref 70–99)
GLUCOSE SERPL-MCNC: 94 MG/DL — SIGNIFICANT CHANGE UP (ref 70–99)
HCG SERPL-ACNC: <1 MIU/ML — SIGNIFICANT CHANGE UP
HCT VFR BLD CALC: 27.6 % — LOW (ref 34.5–45)
HCT VFR BLD CALC: 28.1 % — LOW (ref 34.5–45)
HGB BLD-MCNC: 8.5 G/DL — LOW (ref 11.5–15.5)
HGB BLD-MCNC: 8.7 G/DL — LOW (ref 11.5–15.5)
IMM GRANULOCYTES NFR BLD AUTO: 1.6 % — HIGH (ref 0–1.5)
LYMPHOCYTES # BLD AUTO: 0.57 K/UL — LOW (ref 1–3.3)
LYMPHOCYTES # BLD AUTO: 5.1 % — LOW (ref 13–44)
MCHC RBC-ENTMCNC: 28.2 PG — SIGNIFICANT CHANGE UP (ref 27–34)
MCHC RBC-ENTMCNC: 28.5 PG — SIGNIFICANT CHANGE UP (ref 27–34)
MCHC RBC-ENTMCNC: 30.8 GM/DL — LOW (ref 32–36)
MCHC RBC-ENTMCNC: 31 GM/DL — LOW (ref 32–36)
MCV RBC AUTO: 91.2 FL — SIGNIFICANT CHANGE UP (ref 80–100)
MCV RBC AUTO: 92.6 FL — SIGNIFICANT CHANGE UP (ref 80–100)
MONOCYTES # BLD AUTO: 0.3 K/UL — SIGNIFICANT CHANGE UP (ref 0–0.9)
MONOCYTES NFR BLD AUTO: 2.7 % — SIGNIFICANT CHANGE UP (ref 2–14)
NEUTROPHILS # BLD AUTO: 10.06 K/UL — HIGH (ref 1.8–7.4)
NEUTROPHILS NFR BLD AUTO: 90.5 % — HIGH (ref 43–77)
PHOSPHATE SERPL-MCNC: 9.6 MG/DL — HIGH (ref 2.5–4.5)
PLATELET # BLD AUTO: 245 K/UL — SIGNIFICANT CHANGE UP (ref 150–400)
PLATELET # BLD AUTO: 282 K/UL — SIGNIFICANT CHANGE UP (ref 150–400)
POTASSIUM SERPL-MCNC: 4.6 MMOL/L — SIGNIFICANT CHANGE UP (ref 3.5–5.3)
POTASSIUM SERPL-MCNC: 4.9 MMOL/L — SIGNIFICANT CHANGE UP (ref 3.5–5.3)
POTASSIUM SERPL-MCNC: 6.1 MMOL/L — HIGH (ref 3.5–5.3)
POTASSIUM SERPL-SCNC: 4.6 MMOL/L — SIGNIFICANT CHANGE UP (ref 3.5–5.3)
POTASSIUM SERPL-SCNC: 4.9 MMOL/L — SIGNIFICANT CHANGE UP (ref 3.5–5.3)
POTASSIUM SERPL-SCNC: 6.1 MMOL/L — HIGH (ref 3.5–5.3)
PROT SERPL-MCNC: 7.1 GM/DL — SIGNIFICANT CHANGE UP (ref 6–8.3)
RAPID RVP RESULT: SIGNIFICANT CHANGE UP
RBC # BLD: 2.98 M/UL — LOW (ref 3.8–5.2)
RBC # BLD: 3.08 M/UL — LOW (ref 3.8–5.2)
RBC # FLD: 19.1 % — HIGH (ref 10.3–14.5)
RBC # FLD: 19.6 % — HIGH (ref 10.3–14.5)
SARS-COV-2 RNA SPEC QL NAA+PROBE: SIGNIFICANT CHANGE UP
SODIUM SERPL-SCNC: 134 MMOL/L — LOW (ref 135–145)
SODIUM SERPL-SCNC: 135 MMOL/L — SIGNIFICANT CHANGE UP (ref 135–145)
SODIUM SERPL-SCNC: 136 MMOL/L — SIGNIFICANT CHANGE UP (ref 135–145)
WBC # BLD: 11.12 K/UL — HIGH (ref 3.8–10.5)
WBC # BLD: 17.45 K/UL — HIGH (ref 3.8–10.5)
WBC # FLD AUTO: 11.12 K/UL — HIGH (ref 3.8–10.5)
WBC # FLD AUTO: 17.45 K/UL — HIGH (ref 3.8–10.5)

## 2021-11-30 PROCEDURE — 36556 INSERT NON-TUNNEL CV CATH: CPT

## 2021-11-30 PROCEDURE — 82962 GLUCOSE BLOOD TEST: CPT

## 2021-11-30 PROCEDURE — C1894: CPT

## 2021-11-30 PROCEDURE — 77001 FLUOROGUIDE FOR VEIN DEVICE: CPT

## 2021-11-30 PROCEDURE — C1750: CPT

## 2021-11-30 PROCEDURE — 86850 RBC ANTIBODY SCREEN: CPT

## 2021-11-30 PROCEDURE — P9016: CPT

## 2021-11-30 PROCEDURE — C1769: CPT

## 2021-11-30 PROCEDURE — 90999 UNLISTED DIALYSIS PROCEDURE: CPT

## 2021-11-30 PROCEDURE — 76937 US GUIDE VASCULAR ACCESS: CPT

## 2021-11-30 PROCEDURE — 77001 FLUOROGUIDE FOR VEIN DEVICE: CPT | Mod: 26

## 2021-11-30 PROCEDURE — 86901 BLOOD TYPING SEROLOGIC RH(D): CPT

## 2021-11-30 PROCEDURE — 99261: CPT

## 2021-11-30 PROCEDURE — 87507 IADNA-DNA/RNA PROBE TQ 12-25: CPT

## 2021-11-30 PROCEDURE — 86923 COMPATIBILITY TEST ELECTRIC: CPT

## 2021-11-30 PROCEDURE — 99497 ADVNCD CARE PLAN 30 MIN: CPT | Mod: 25

## 2021-11-30 PROCEDURE — U0003: CPT

## 2021-11-30 PROCEDURE — 85018 HEMOGLOBIN: CPT

## 2021-11-30 PROCEDURE — U0005: CPT

## 2021-11-30 PROCEDURE — 82272 OCCULT BLD FECES 1-3 TESTS: CPT

## 2021-11-30 PROCEDURE — C1887: CPT

## 2021-11-30 PROCEDURE — 36430 TRANSFUSION BLD/BLD COMPNT: CPT

## 2021-11-30 PROCEDURE — 85025 COMPLETE CBC W/AUTO DIFF WBC: CPT

## 2021-11-30 PROCEDURE — C1752: CPT

## 2021-11-30 PROCEDURE — 87077 CULTURE AEROBIC IDENTIFY: CPT

## 2021-11-30 PROCEDURE — 80048 BASIC METABOLIC PNL TOTAL CA: CPT

## 2021-11-30 PROCEDURE — 99222 1ST HOSP IP/OBS MODERATE 55: CPT

## 2021-11-30 PROCEDURE — 82550 ASSAY OF CK (CPK): CPT

## 2021-11-30 PROCEDURE — 87493 C DIFF AMPLIFIED PROBE: CPT

## 2021-11-30 PROCEDURE — 86900 BLOOD TYPING SEROLOGIC ABO: CPT

## 2021-11-30 PROCEDURE — 36415 COLL VENOUS BLD VENIPUNCTURE: CPT

## 2021-11-30 PROCEDURE — 87040 BLOOD CULTURE FOR BACTERIA: CPT

## 2021-11-30 PROCEDURE — 83735 ASSAY OF MAGNESIUM: CPT

## 2021-11-30 PROCEDURE — 76937 US GUIDE VASCULAR ACCESS: CPT | Mod: 26

## 2021-11-30 PROCEDURE — 94640 AIRWAY INHALATION TREATMENT: CPT

## 2021-11-30 PROCEDURE — 80069 RENAL FUNCTION PANEL: CPT

## 2021-11-30 PROCEDURE — 85027 COMPLETE CBC AUTOMATED: CPT

## 2021-11-30 PROCEDURE — 84100 ASSAY OF PHOSPHORUS: CPT

## 2021-11-30 PROCEDURE — 85014 HEMATOCRIT: CPT

## 2021-11-30 PROCEDURE — 36558 INSERT TUNNELED CV CATH: CPT

## 2021-11-30 PROCEDURE — 80074 ACUTE HEPATITIS PANEL: CPT

## 2021-11-30 RX ORDER — DEXTROSE 50 % IN WATER 50 %
50 SYRINGE (ML) INTRAVENOUS ONCE
Refills: 0 | Status: COMPLETED | OUTPATIENT
Start: 2021-11-30 | End: 2021-11-30

## 2021-11-30 RX ORDER — HYDROXYCHLOROQUINE SULFATE 200 MG
200 TABLET ORAL DAILY
Refills: 0 | Status: DISCONTINUED | OUTPATIENT
Start: 2021-11-30 | End: 2021-12-09

## 2021-11-30 RX ORDER — ERYTHROPOIETIN 10000 [IU]/ML
10000 INJECTION, SOLUTION INTRAVENOUS; SUBCUTANEOUS
Refills: 0 | Status: DISCONTINUED | OUTPATIENT
Start: 2021-11-30 | End: 2021-12-09

## 2021-11-30 RX ORDER — HEPARIN SODIUM 5000 [USP'U]/ML
5000 INJECTION INTRAVENOUS; SUBCUTANEOUS EVERY 12 HOURS
Refills: 0 | Status: DISCONTINUED | OUTPATIENT
Start: 2021-11-30 | End: 2021-12-09

## 2021-11-30 RX ORDER — INSULIN HUMAN 100 [IU]/ML
10 INJECTION, SOLUTION SUBCUTANEOUS ONCE
Refills: 0 | Status: COMPLETED | OUTPATIENT
Start: 2021-11-30 | End: 2021-11-30

## 2021-11-30 RX ORDER — INFLUENZA VIRUS VACCINE 15; 15; 15; 15 UG/.5ML; UG/.5ML; UG/.5ML; UG/.5ML
0.5 SUSPENSION INTRAMUSCULAR ONCE
Refills: 0 | Status: COMPLETED | OUTPATIENT
Start: 2021-11-30 | End: 2021-11-30

## 2021-11-30 RX ORDER — LANOLIN ALCOHOL/MO/W.PET/CERES
3 CREAM (GRAM) TOPICAL AT BEDTIME
Refills: 0 | Status: DISCONTINUED | OUTPATIENT
Start: 2021-11-30 | End: 2021-12-09

## 2021-11-30 RX ORDER — ACETAMINOPHEN 500 MG
650 TABLET ORAL EVERY 6 HOURS
Refills: 0 | Status: DISCONTINUED | OUTPATIENT
Start: 2021-11-30 | End: 2021-12-09

## 2021-11-30 RX ORDER — ATOVAQUONE 750 MG/5ML
1500 SUSPENSION ORAL DAILY
Refills: 0 | Status: DISCONTINUED | OUTPATIENT
Start: 2021-11-30 | End: 2021-12-09

## 2021-11-30 RX ORDER — PANTOPRAZOLE SODIUM 20 MG/1
40 TABLET, DELAYED RELEASE ORAL
Refills: 0 | Status: DISCONTINUED | OUTPATIENT
Start: 2021-11-30 | End: 2021-12-09

## 2021-11-30 RX ORDER — SODIUM ZIRCONIUM CYCLOSILICATE 10 G/10G
5 POWDER, FOR SUSPENSION ORAL ONCE
Refills: 0 | Status: COMPLETED | OUTPATIENT
Start: 2021-11-30 | End: 2021-11-30

## 2021-11-30 RX ORDER — CHLORHEXIDINE GLUCONATE 213 G/1000ML
1 SOLUTION TOPICAL
Refills: 0 | Status: DISCONTINUED | OUTPATIENT
Start: 2021-11-30 | End: 2021-12-09

## 2021-11-30 RX ORDER — SODIUM CHLORIDE 9 MG/ML
10 INJECTION INTRAMUSCULAR; INTRAVENOUS; SUBCUTANEOUS
Refills: 0 | Status: DISCONTINUED | OUTPATIENT
Start: 2021-11-30 | End: 2021-12-09

## 2021-11-30 RX ORDER — HEPARIN SODIUM 5000 [USP'U]/ML
1000 INJECTION INTRAVENOUS; SUBCUTANEOUS
Refills: 0 | Status: DISCONTINUED | OUTPATIENT
Start: 2021-11-30 | End: 2021-12-09

## 2021-11-30 RX ORDER — ONDANSETRON 8 MG/1
4 TABLET, FILM COATED ORAL EVERY 8 HOURS
Refills: 0 | Status: DISCONTINUED | OUTPATIENT
Start: 2021-11-30 | End: 2021-12-09

## 2021-11-30 RX ORDER — AMLODIPINE BESYLATE 2.5 MG/1
10 TABLET ORAL DAILY
Refills: 0 | Status: DISCONTINUED | OUTPATIENT
Start: 2021-11-30 | End: 2021-12-09

## 2021-11-30 RX ORDER — OXYCODONE AND ACETAMINOPHEN 5; 325 MG/1; MG/1
2 TABLET ORAL EVERY 6 HOURS
Refills: 0 | Status: DISCONTINUED | OUTPATIENT
Start: 2021-11-30 | End: 2021-12-05

## 2021-11-30 RX ORDER — GABAPENTIN 400 MG/1
100 CAPSULE ORAL THREE TIMES A DAY
Refills: 0 | Status: DISCONTINUED | OUTPATIENT
Start: 2021-11-30 | End: 2021-12-09

## 2021-11-30 RX ORDER — HEPARIN SODIUM 5000 [USP'U]/ML
500 INJECTION INTRAVENOUS; SUBCUTANEOUS
Refills: 0 | Status: COMPLETED | OUTPATIENT
Start: 2021-11-30 | End: 2021-12-02

## 2021-11-30 RX ORDER — LOPERAMIDE HCL 2 MG
2 TABLET ORAL THREE TIMES A DAY
Refills: 0 | Status: DISCONTINUED | OUTPATIENT
Start: 2021-11-30 | End: 2021-12-05

## 2021-11-30 RX ADMIN — Medication 50 MILLILITER(S): at 08:56

## 2021-11-30 RX ADMIN — HEPARIN SODIUM 5000 UNIT(S): 5000 INJECTION INTRAVENOUS; SUBCUTANEOUS at 21:44

## 2021-11-30 RX ADMIN — OXYCODONE AND ACETAMINOPHEN 2 TABLET(S): 5; 325 TABLET ORAL at 23:00

## 2021-11-30 RX ADMIN — INSULIN HUMAN 10 UNIT(S): 100 INJECTION, SOLUTION SUBCUTANEOUS at 08:56

## 2021-11-30 RX ADMIN — OXYCODONE AND ACETAMINOPHEN 2 TABLET(S): 5; 325 TABLET ORAL at 21:44

## 2021-11-30 RX ADMIN — GABAPENTIN 100 MILLIGRAM(S): 400 CAPSULE ORAL at 21:44

## 2021-11-30 RX ADMIN — ERYTHROPOIETIN 10000 UNIT(S): 10000 INJECTION, SOLUTION INTRAVENOUS; SUBCUTANEOUS at 18:55

## 2021-11-30 RX ADMIN — Medication 3 MILLIGRAM(S): at 21:44

## 2021-11-30 RX ADMIN — SODIUM ZIRCONIUM CYCLOSILICATE 5 GRAM(S): 10 POWDER, FOR SUSPENSION ORAL at 08:56

## 2021-11-30 RX ADMIN — Medication 1 DROP(S): at 21:45

## 2021-11-30 NOTE — H&P ADULT - HISTORY OF PRESENT ILLNESS
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 Found to have K of 6.1. She denies any cp/n/v. c/o some SOB and leg edema.

## 2021-11-30 NOTE — ED ADULT NURSE REASSESSMENT NOTE - NS ED NURSE REASSESS COMMENT FT1
MD Gil made aware of patients lethargy, falling asleep while talking to the patient, VITALS stable besides .  Will CTM.  Finger stick normal.

## 2021-11-30 NOTE — H&P ADULT - NSHPPHYSICALEXAM_GEN_ALL_CORE
PHYSICAL EXAM:    Daily Height in cm: 165.1 (29 Nov 2021 21:54)    Daily     Vital Signs Last 24 Hrs  T(C): 36.8 (30 Nov 2021 06:20), Max: 37.1 (29 Nov 2021 22:15)  T(F): 98.3 (30 Nov 2021 06:20), Max: 98.8 (29 Nov 2021 22:15)  HR: 93 (30 Nov 2021 12:12) (80 - 116)  BP: 116/63 (30 Nov 2021 12:12) (107/75 - 149/90)  BP(mean): 111 (30 Nov 2021 11:34) (111 - 111)  RR: 17 (30 Nov 2021 12:12) (17 - 18)  SpO2: 97% (30 Nov 2021 12:12) (97% - 100%)    Constitutional: Weak  appearing  HEENT: Atraumatic, ENE, Normal, No congestion  Respiratory: Breath Sounds normal, no rhonchi/wheeze  Cardiovascular: N S1S2;   Gastrointestinal: Abdomen soft, non tender, Bowel Sounds present  Extremities: 2+  edema, peripheral pulses present  Neurological: AAO x 3, no gross focal motor deficits  Skin: Non cellulitic, no rash, ulcers  Lymph Nodes: No lymphadenopathy noted  Back: No CVA tenderness   Musculoskeletal: non tender  Breasts: Deferred  Genitourinary: deferred  Rectal: Deferred

## 2021-11-30 NOTE — ED ADULT NURSE REASSESSMENT NOTE - NS ED NURSE REASSESS COMMENT FT1
pt AAO X 4, receive at the beginning of shift, c/o abdominal cramps, stated she is having diarrhea all night. call bell with in pt reach no acute distress noted.

## 2021-11-30 NOTE — ED ADULT NURSE REASSESSMENT NOTE - NS ED NURSE REASSESS COMMENT FT1
Received pt from Veena VALENCIA. Pt currently sleeping, respirations even and unlabored. Waiting for admisson bed

## 2021-11-30 NOTE — H&P ADULT - ASSESSMENT
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 Found to have K of 6.1. She denies any cp/n/v. c/o some SOB and leg edema.     Pt admitted with     1) Hyperkalemia: 6.1  admit to tele  given lokelma  gave insulin and D50  repeat k 4.9  called IR and consult placed for HD catheter  discussed with Dr. Soni for HD  cxr clear  BMP in am    2) ESRD: need HD:  called IR and consult placed for HD catheter  discussed with Dr. Soni for HD  cxr clear    3) HTN: home meds    4) Lupus: home meds    5) DVT PPX: venodynes     poc discussed with pt, team, Dr. Soni.       C and advance care planning meeting done with the patient. She wishes to be fully resuscitated and mechanically ventilated if need arises. There are no limitations of any sort in her medical care and management. She is FULL CODE. Additional time spent 19 min.      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 Found to have K of 6.1. She denies any cp/n/v. c/o some SOB and leg edema.     Pt admitted with     1) Hyperkalemia: 6.1  admit to tele  given lokelma  gave insulin and D50  repeat k 4.9  called IR and consult placed for HD catheter  discussed with Dr. Soni for HD  cxr clear  BMP in am    2) ESRD: need HD:  called IR and consult placed for HD catheter  discussed with Dr. Soni for HD  cxr clear    3) HTN: home meds    4) Lupus: home meds    5) DVT PPX: venodynes , heparin s/q    poc discussed with pt, team, Dr. Soni.       [facility] and advance care planning meeting done with the patient. She wishes to be fully resuscitated and mechanically ventilated if need arises. There are no limitations of any sort in her medical care and management. She is FULL CODE. Additional time spent 19 min.

## 2021-11-30 NOTE — ED ADULT NURSE REASSESSMENT NOTE - NS ED NURSE REASSESS COMMENT FT1
Pt had Shiley placed by IR team with no event, pt resting comfortably and ready to start dialysis treatment.

## 2021-11-30 NOTE — H&P ADULT - NSHPLABSRESULTS_GEN_ALL_CORE
All Labs/EKG/Radiology/Meds reviewed by me.     Lab Results:  CBC  CBC Full  -  ( 29 Nov 2021 23:48 )  WBC Count : 11.12 K/uL  RBC Count : 3.08 M/uL  Hemoglobin : 8.7 g/dL  Hematocrit : 28.1 %  Platelet Count - Automated : 282 K/uL  Mean Cell Volume : 91.2 fl  Mean Cell Hemoglobin : 28.2 pg  Mean Cell Hemoglobin Concentration : 31.0 gm/dL  Auto Neutrophil # : 10.06 K/uL  Auto Lymphocyte # : 0.57 K/uL  Auto Monocyte # : 0.30 K/uL  Auto Eosinophil # : 0.00 K/uL  Auto Basophil # : 0.01 K/uL  Auto Neutrophil % : 90.5 %  Auto Lymphocyte % : 5.1 %  Auto Monocyte % : 2.7 %  Auto Eosinophil % : 0.0 %  Auto Basophil % : 0.1 %    .		Differential:	[] Automated		[] Manual  Chemistry                        8.7    11.12 )-----------( 282      ( 29 Nov 2021 23:48 )             28.1     11-30    135  |  105  |  158<H>  ----------------------------<  243<H>  4.9   |  14<L>  |  7.65<H>    Ca    7.5<L>      30 Nov 2021 09:12    TPro  7.1  /  Alb  3.0<L>  /  TBili  0.3  /  DBili  x   /  AST  11<L>  /  ALT  23  /  AlkPhos  78  11-29    LIVER FUNCTIONS - ( 29 Nov 2021 23:48 )  Alb: 3.0 g/dL / Pro: 7.1 gm/dL / ALK PHOS: 78 U/L / ALT: 23 U/L / AST: 11 U/L / GGT: x           PT/INR - ( 30 Nov 2021 00:39 )   PT: 11.7 sec;   INR: 1.01 ratio         PTT - ( 30 Nov 2021 00:39 )  PTT:26.8 sec        RADIOLOGY RESULTS:    < from: Xray Chest 1 View- PORTABLE-Urgent (11.29.21 @ 23:03) >    Heart magnified by technique.    Lung fields and pleural surfaces are unremarkable.    Advanced left shoulder degeneration again noted.    Jugular line seen on November 15 is been removed.< from: Xray Chest 1 View- PORTABLE-Urgent (11.29.21 @ 23:03) >    Heart magnified by technique.    Lung fields and pleural surfaces are unremarkable.    Advanced left shoulder degeneration again noted.    Jugular line seen on November 15 is been removed.    < end of copied text >        MEDICATIONS  (STANDING):  amLODIPine   Tablet 10 milliGRAM(s) Oral daily  artificial  tears Solution 1 Drop(s) Both EYES two times a day  atovaquone  Suspension 1500 milliGRAM(s) Oral daily  cloNIDine 0.1 milliGRAM(s) Oral daily  epoetin amee-epbx (RETACRIT) Injectable 61840 Unit(s) IV Push <User Schedule>  gabapentin 100 milliGRAM(s) Oral three times a day  heparin   Injectable. 1000 Unit(s) Dialysis. <User Schedule>  heparin   Injectable. 500 Unit(s) Dialysis. <User Schedule>  hydroxychloroquine 200 milliGRAM(s) Oral daily  pantoprazole    Tablet 40 milliGRAM(s) Oral before breakfast  predniSONE   Tablet 50 milliGRAM(s) Oral daily    MEDICATIONS  (PRN):  acetaminophen     Tablet .. 650 milliGRAM(s) Oral every 6 hours PRN Temp greater or equal to 38C (100.4F), Mild Pain (1 - 3)  loperamide 2 milliGRAM(s) Oral three times a day PRN Diarrhea  melatonin 3 milliGRAM(s) Oral at bedtime PRN Insomnia  ondansetron Injectable 4 milliGRAM(s) IV Push every 8 hours PRN Nausea and/or Vomiting  oxycodone    5 mG/acetaminophen 325 mG 2 Tablet(s) Oral every 6 hours PRN Severe Pain (7 - 10)

## 2021-11-30 NOTE — H&P ADULT - NSHPREVIEWOFSYSTEMS_GEN_ALL_CORE
REVIEW OF SYSTEMS:    CONSTITUTIONAL: No weakness, fevers or chills  EYES/ENT: No visual changes;  No vertigo or throat pain   NECK: No pain or stiffness  RESPIRATORY: No cough, wheezing, hemoptysis; + shortness of breath  CARDIOVASCULAR: No Chest pain or palpitations  GASTROINTESTINAL: No abdominal or epigastric pain. No nausea, vomiting; No diarrhea or constipation.   GENITOURINARY: No dysuria, frequency or hematuria  NEUROLOGICAL: No numbness or weakness  SKIN: No itching, rashes

## 2021-12-01 LAB
ALBUMIN SERPL ELPH-MCNC: 2.9 G/DL — LOW (ref 3.3–5)
ANION GAP SERPL CALC-SCNC: 13 MMOL/L — SIGNIFICANT CHANGE UP (ref 5–17)
BUN SERPL-MCNC: 89 MG/DL — HIGH (ref 7–23)
CALCIUM SERPL-MCNC: 7.8 MG/DL — LOW (ref 8.5–10.1)
CHLORIDE SERPL-SCNC: 102 MMOL/L — SIGNIFICANT CHANGE UP (ref 96–108)
CO2 SERPL-SCNC: 19 MMOL/L — LOW (ref 22–31)
CREAT SERPL-MCNC: 5.36 MG/DL — HIGH (ref 0.5–1.3)
GLUCOSE SERPL-MCNC: 88 MG/DL — SIGNIFICANT CHANGE UP (ref 70–99)
HAV IGM SER-ACNC: SIGNIFICANT CHANGE UP
HBV CORE IGM SER-ACNC: SIGNIFICANT CHANGE UP
HBV SURFACE AG SER-ACNC: SIGNIFICANT CHANGE UP
HCT VFR BLD CALC: 29.5 % — LOW (ref 34.5–45)
HCV AB S/CO SERPL IA: 0.21 S/CO — SIGNIFICANT CHANGE UP (ref 0–0.99)
HCV AB SERPL-IMP: SIGNIFICANT CHANGE UP
HGB BLD-MCNC: 9.4 G/DL — LOW (ref 11.5–15.5)
MCHC RBC-ENTMCNC: 29.1 PG — SIGNIFICANT CHANGE UP (ref 27–34)
MCHC RBC-ENTMCNC: 31.9 GM/DL — LOW (ref 32–36)
MCV RBC AUTO: 91.3 FL — SIGNIFICANT CHANGE UP (ref 80–100)
PHOSPHATE SERPL-MCNC: 7.2 MG/DL — HIGH (ref 2.5–4.5)
PLATELET # BLD AUTO: 265 K/UL — SIGNIFICANT CHANGE UP (ref 150–400)
POTASSIUM SERPL-MCNC: 4.5 MMOL/L — SIGNIFICANT CHANGE UP (ref 3.5–5.3)
POTASSIUM SERPL-SCNC: 4.5 MMOL/L — SIGNIFICANT CHANGE UP (ref 3.5–5.3)
RBC # BLD: 3.23 M/UL — LOW (ref 3.8–5.2)
RBC # FLD: 19.2 % — HIGH (ref 10.3–14.5)
SODIUM SERPL-SCNC: 134 MMOL/L — LOW (ref 135–145)
WBC # BLD: 13.62 K/UL — HIGH (ref 3.8–10.5)
WBC # FLD AUTO: 13.62 K/UL — HIGH (ref 3.8–10.5)

## 2021-12-01 PROCEDURE — 99233 SBSQ HOSP IP/OBS HIGH 50: CPT

## 2021-12-01 PROCEDURE — G9005: CPT

## 2021-12-01 RX ORDER — DAPTOMYCIN 500 MG/10ML
350 INJECTION, POWDER, LYOPHILIZED, FOR SOLUTION INTRAVENOUS
Refills: 0 | Status: DISCONTINUED | OUTPATIENT
Start: 2021-12-01 | End: 2021-12-09

## 2021-12-01 RX ADMIN — Medication 3 MILLIGRAM(S): at 22:18

## 2021-12-01 RX ADMIN — CHLORHEXIDINE GLUCONATE 1 APPLICATION(S): 213 SOLUTION TOPICAL at 11:13

## 2021-12-01 RX ADMIN — HEPARIN SODIUM 5000 UNIT(S): 5000 INJECTION INTRAVENOUS; SUBCUTANEOUS at 22:18

## 2021-12-01 RX ADMIN — GABAPENTIN 100 MILLIGRAM(S): 400 CAPSULE ORAL at 06:07

## 2021-12-01 RX ADMIN — OXYCODONE AND ACETAMINOPHEN 2 TABLET(S): 5; 325 TABLET ORAL at 18:01

## 2021-12-01 RX ADMIN — OXYCODONE AND ACETAMINOPHEN 2 TABLET(S): 5; 325 TABLET ORAL at 03:35

## 2021-12-01 RX ADMIN — Medication 0.1 MILLIGRAM(S): at 11:12

## 2021-12-01 RX ADMIN — ATOVAQUONE 1500 MILLIGRAM(S): 750 SUSPENSION ORAL at 12:15

## 2021-12-01 RX ADMIN — HEPARIN SODIUM 5000 UNIT(S): 5000 INJECTION INTRAVENOUS; SUBCUTANEOUS at 11:12

## 2021-12-01 RX ADMIN — GABAPENTIN 100 MILLIGRAM(S): 400 CAPSULE ORAL at 22:18

## 2021-12-01 RX ADMIN — PANTOPRAZOLE SODIUM 40 MILLIGRAM(S): 20 TABLET, DELAYED RELEASE ORAL at 06:07

## 2021-12-01 RX ADMIN — OXYCODONE AND ACETAMINOPHEN 2 TABLET(S): 5; 325 TABLET ORAL at 11:12

## 2021-12-01 RX ADMIN — Medication 50 MILLIGRAM(S): at 11:12

## 2021-12-01 RX ADMIN — Medication 1 DROP(S): at 12:15

## 2021-12-01 RX ADMIN — AMLODIPINE BESYLATE 10 MILLIGRAM(S): 2.5 TABLET ORAL at 11:12

## 2021-12-01 RX ADMIN — Medication 200 MILLIGRAM(S): at 11:12

## 2021-12-01 RX ADMIN — Medication 1 DROP(S): at 22:17

## 2021-12-01 NOTE — PROGRESS NOTE ADULT - SUBJECTIVE AND OBJECTIVE BOX
Patient is a 41y old  Female who presents with a chief complaint of for HD, Hyperkalemia (01 Dec 2021 10:20)      SUBJECTIVE:   HPI:  41/ F with PMHx of  bacteremia,  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 Found to have K of 6.1. She denies any cp/n/v. c/o some SOB and leg edema.         sub: pt has no complaints. K normalized, Pt with -120, pt states this is her norm, Adamant she is not withdrawing and only uses MJ. Having diarrhea from kayexalate.     REVIEW OF SYSTEMS:    CONSTITUTIONAL: No weakness, fevers or chills  EYES/ENT: No visual changes;  No vertigo or throat pain   NECK: No pain or stiffness  RESPIRATORY: No cough, wheezing, hemoptysis; No shortness of breath  CARDIOVASCULAR: No chest pain or palpitations  GASTROINTESTINAL: No abdominal or epigastric pain. No nausea, vomiting, or hematemesis; No diarrhea or constipation. No melena or hematochezia.  GENITOURINARY: No dysuria, frequency or hematuria  NEUROLOGICAL: No numbness or weakness  SKIN: No itching, burning, rashes, or lesions   All other review of systems is negative unless indicated above      Patient is a 41y old  Female who presents with a chief complaint of for HD, Hyperkalemia (01 Dec 2021 10:20)    ICU Vital Signs Last 24 Hrs  T(C): 36.4 (01 Dec 2021 09:59), Max: 37 (30 Nov 2021 14:59)  T(F): 97.5 (01 Dec 2021 09:59), Max: 98.6 (30 Nov 2021 14:59)  HR: 98 (01 Dec 2021 09:59) (89 - 106)  BP: 155/110 (01 Dec 2021 09:59) (110/70 - 159/109)  BP(mean): --  ABP: --  ABP(mean): --  RR: 19 (01 Dec 2021 09:59) (14 - 20)  SpO2: 96% (01 Dec 2021 09:59) (95% - 100%)            PHYSICAL EXAM:    Constitutional: NAD, awake and alert,   HEENT: PERR, EOMI, Normal Hearing, MMM  Neck: Soft and supple, No LAD, No JVD  Respiratory: Breath sounds are clear bilaterally, No wheezing, rales or rhonchi  Cardiovascular: S1 and S2, regular rate and rhythm, no Murmurs, gallops or rubs  Gastrointestinal: Bowel Sounds present, soft, nontender, nondistended, no guarding, no rebound  Extremities: No peripheral edema  Vascular: 2+ peripheral pulses  Neurological: A/O x 3, no focal deficits  Musculoskeletal: 5/5 strength b/l upper and lower extremities  Skin: No rashes    MEDICATIONS:  MEDICATIONS  (STANDING):  amLODIPine   Tablet 10 milliGRAM(s) Oral daily  artificial  tears Solution 1 Drop(s) Both EYES two times a day  atovaquone  Suspension 1500 milliGRAM(s) Oral daily  chlorhexidine 4% Liquid 1 Application(s) Topical <User Schedule>  cloNIDine 0.1 milliGRAM(s) Oral daily  epoetin amee-epbx (RETACRIT) Injectable 95910 Unit(s) IV Push <User Schedule>  gabapentin 100 milliGRAM(s) Oral three times a day  heparin   Injectable 5000 Unit(s) SubCutaneous every 12 hours  heparin   Injectable. 1000 Unit(s) Dialysis. <User Schedule>  heparin   Injectable. 500 Unit(s) Dialysis. <User Schedule>  hydroxychloroquine 200 milliGRAM(s) Oral daily  influenza   Vaccine 0.5 milliLiter(s) IntraMuscular once  pantoprazole    Tablet 40 milliGRAM(s) Oral before breakfast  predniSONE   Tablet 50 milliGRAM(s) Oral daily      LABS: All Labs Reviewed:                        9.4    13.62 )-----------( 265      ( 01 Dec 2021 07:49 )             29.5     12-01    134<L>  |  102  |  89<H>  ----------------------------<  88  4.5   |  19<L>  |  5.36<H>    Ca    7.8<L>      01 Dec 2021 07:48  Phos  7.2     12-01    TPro  x   /  Alb  2.9<L>  /  TBili  x   /  DBili  x   /  AST  x   /  ALT  x   /  AlkPhos  x   12-01    PT/INR - ( 30 Nov 2021 00:39 )   PT: 11.7 sec;   INR: 1.01 ratio         PTT - ( 30 Nov 2021 00:39 )  PTT:26.8 sec          Blood Culture:     RADIOLOGY/EKG: reviewed

## 2021-12-01 NOTE — ADVANCED PRACTICE NURSE CONSULT - ASSESSMENT
This is a 41 year old female admitted to the hospital on 11/30/2021 for weakness and ESRD.  PMH aponia, bacteremia, COPD, depression, epilepsy, GERD, Raynaud's syndrome, heroin abuse, HTN, lupus, RA, sepsis, SLE, smoker, ESRD (initiated on HD 7 weeks ago with tunneled HD catheter placed 10/22/21). Assessed patient on 3 North . Patient was lying in bed positioned to her left side. She is alert and oriented x's 4, and able to turn and position self. Upon assessment noted a wound to sacrum measuring 3cm x 1cm x unknown related to 100% stable eshcar/scab to sacrum. This wound can be classified as an Unstadgeable. Foam placed over wound and will continue to monitor Patient denies pain and reported that the foam aids  in her comfort and also reported that the wound is progressing well. She has had this wound for over 4 months.  Wound to Left Lateral LE wound 3cm x 1cm x 0.1cm with no drainage noted Foam applied for comfort.

## 2021-12-01 NOTE — PROGRESS NOTE ADULT - ASSESSMENT
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 Found to have K of 6.1. She denies any cp/n/v. c/o some SOB and leg edema.         1) Hyperkalemia 6.1  sp lokelma, insulin cocktaol and HD  plan for HD TTS scheduling  Plan for TDC once cleared from ID persepctive  Pt left AMA last week after she was currently under tx for mrsa bacteremia with dapto  discussed with Dr. Soni for HD  cxr clear  BMP in am  Can dc tele monitoring    2) ESRD: need HD:  called IR and consult placed for HD catheter  discussed with Dr. Soni for HD  cxr clear    3) HTN: home meds    4) Lupus: home meds    5) MRSA bacteremia/IVDA  Line removed during last admission  echo w/o veg  RIJ placed this admission  Most recent Bcx negative  ID to discuss duration of dapto and clearance for TDC    5) DVT PPX: venodynes , heparin s/q    poc discussed with pt, team, Dr. Soni.       [facility] and advance care planning meeting done with the patient. She wishes to be fully resuscitated and mechanically ventilated if need arises. There are no limitations of any sort in her medical care and management. She is FULL CODE. Additional time spent 19 min.

## 2021-12-01 NOTE — DIETITIAN INITIAL EVALUATION ADULT. - ORAL INTAKE PTA/DIET HISTORY
reports 3 meals/day and 3 nepro per day; also states non-compliance with ESRD on HD diet over holiday (declan), pt with good knowledge of dialysis diet.  no wt change reported.  encourage nepro intake and d/w pt gelatein (protein jello), willing to trial (however, currently do not have in hospital due to shortage).

## 2021-12-01 NOTE — CONSULT NOTE ADULT - ASSESSMENT
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    NATALIA on CKD with HD dependence with MRSA bacteremia    Last HD 11/24, patient left AMA here with stigmata of vol Ol and hyperkalemia now   will need IR for temp cath, HD thereafter and maintain TTS   re-insertion of TDC once cleared by ID, cultures cleared etc    Anemia   s/p PRBC with HD last admit   TAMARA w HD to resume    HTN  -Oral meds    ANCA Vasculitis  -Still on prednisone   rheum followup     d/c with Rn staff, HD staff   
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 Patient is poor historian.     1. Patient admitted for continuation of her treatment for MRSA sepsis; dialysis patient  - follow up cultures   - serial cbc and monitor temperature   - reviewed prior medical records to evaluate for resistant or atypical pathogens   - will start daptomycin 350 mg iv q 48 hours  - had echocardiogram on the recent admission  - temporary shiley at this time  - need to show negative blood cultures prior to replacement of tession  2. other issues; per medicine

## 2021-12-01 NOTE — DIETITIAN INITIAL EVALUATION ADULT. - OTHER INFO
40yo female with PMH significant for bacteremia, kidney Bx proven ANCA vasculitis presumed 2/2 cocaine/heroin abuse, COPD, depression, epilepsy, GERD, Raynaud's syndrome, heroin abuse, HTN, lupud, RA, sepsis, smoker, ESRD on HD (tunnel catheter placed on 10/22/1).   Pt admitted for hyperkalemia.    *noted vitamin D level on 10/25/21 of 8.2; do not note any vitamin D supplementation in outpatient medication review or in inpatient medication orders.  rec'd supplementation.    *wt hx per EMR: 119# (11/16/21); 110.2# (10/12/21); 116.6# (6/25/20)

## 2021-12-01 NOTE — PROGRESS NOTE ADULT - SUBJECTIVE AND OBJECTIVE BOX
42 y/o F with PMH bacteremia, IVDA, COPD, depression, epilepsy, GERD, Raynaud's syndrome, heroin abuse, HTN, lupus, RA, sepsis, SLE, smoker, NATALIA on CKD 4  (initiated on HD about 2 months ago with tunneled HD catheter placed 10/22/21) w kidney bx proven ANCA vasculitis presumed sec to cociaine, heroin abuse ( Cocaine + on drug tox) now returns after recent mrsa bacteremia/catheter site tenderness. Last HD was the 24th, left AMA without any hd access.   temp shiley placed and HD on 11/30     today    pt feeling better   less sob          PAST MEDICAL & SURGICAL HISTORY:  Lupus    Rheumatoid arthritis    H/O Raynaud&#x27;s syndrome    Heroin abuse    Smoker    Rheumatoid arthritis    Sepsis    HTN (hypertension)    COPD (chronic obstructive pulmonary disease)    Depression    Epilepsy    GERD (gastroesophageal reflux disease)    Raynaud&#x27;s syndrome without gangrene    Bacteremia    Systemic lupus erythematosus    Aphonia    H/O tubal ligation    H/O appendicitis    Gall bladder stones    H/O tracheostomy    S/P percutaneous endoscopic gastrostomy (PEG) tube placement      MEDICATIONS  (STANDING):  amLODIPine   Tablet 10 milliGRAM(s) Oral daily  artificial  tears Solution 1 Drop(s) Both EYES two times a day  atovaquone  Suspension 1500 milliGRAM(s) Oral daily  chlorhexidine 4% Liquid 1 Application(s) Topical <User Schedule>  cloNIDine 0.1 milliGRAM(s) Oral daily  DAPTOmycin IVPB 350 milliGRAM(s) IV Intermittent every 48 hours  epoetin amee-epbx (RETACRIT) Injectable 38551 Unit(s) IV Push <User Schedule>  gabapentin 100 milliGRAM(s) Oral three times a day  heparin   Injectable 5000 Unit(s) SubCutaneous every 12 hours  heparin   Injectable. 1000 Unit(s) Dialysis. <User Schedule>  heparin   Injectable. 500 Unit(s) Dialysis. <User Schedule>  hydroxychloroquine 200 milliGRAM(s) Oral daily  influenza   Vaccine 0.5 milliLiter(s) IntraMuscular once  pantoprazole    Tablet 40 milliGRAM(s) Oral before breakfast  predniSONE   Tablet 50 milliGRAM(s) Oral daily      Allergies    morphine (Rash)  morphine (Unknown)    Intolerances        SOCIAL HISTORY:    FAMILY HISTORY:  Family history of cardiomyopathy (Mother)        REVIEW OF SYSTEMS:    CONSTITUTIONAL: stable weakness, no fevers or chills  EYES/ENT: No visual changes;  No vertigo or throat pain   NECK: No pain or stiffness  RESPIRATORY: stable cough, wheezing, hemoptysis; stable shortness of breath  CARDIOVASCULAR: No chest pain or palpitations  GASTROINTESTINAL: No abdominal or epigastric pain. No nausea, vomiting, or hematemesis; No diarrhea or constipation. No melena or hematochezia.  GENITOURINARY: No dysuria, frequency or hematuria  NEUROLOGICAL: No numbness or weakness  SKIN: No itching, burning, rashes, or lesions   All other review of systems is negative unless indicated above.      Vital Signs Last 24 Hrs  T(C): 36.9 (01 Dec 2021 16:52), Max: 36.9 (01 Dec 2021 16:52)  T(F): 98.5 (01 Dec 2021 16:52), Max: 98.5 (01 Dec 2021 16:52)  HR: 101 (01 Dec 2021 16:52) (98 - 101)  BP: 146/102 (01 Dec 2021 16:52) (146/102 - 155/110)  BP(mean): --  RR: 18 (01 Dec 2021 16:52) (18 - 19)  SpO2: 100% (01 Dec 2021 16:52) (96% - 100%)    PHYSICAL EXAM:    Constitutional: unkempt, poor hygeine  HEENT: >>then stated age  Neck: No LAD, No JVD  Respiratory: dist  Cardiovascular: S1 and S2  Extremities: + peripheral edema  Neurological: A/O x 3, no focal deficits  : No Salgado  Skin: No rashes  Access: [facility]         LABS:                                      9.4    13.62 )-----------( 265      ( 01 Dec 2021 07:49 )             29.5                         8.5    17.45 )-----------( 245      ( 30 Nov 2021 17:02 )             27.6         134    |  102    |  89     ----------------------------<  88        01 Dec 2021 07:48  4.5     |  19     |  5.36     136    |  105    |  165    ----------------------------<  94        30 Nov 2021 17:02  4.6     |  16     |  7.62     135    |  105    |  158    ----------------------------<  243       30 Nov 2021 09:12  4.9     |  14     |  7.65     Ca    7.8        01 Dec 2021 07:48  Ca    7.6        30 Nov 2021 17:02    Phos  7.2       01 Dec 2021 07:48  Phos  9.6       30 Nov 2021 17:02      TPro  x      /  Alb  2.9    /  TBili  x      /        01 Dec 2021 07:48  DBili  x      /  AST  x      /  ALT  x      /  AlkPhos  x        TPro  x      /  Alb  3.2    /  TBili  x      /        30 Nov 2021 17:02  DBili  x      /  AST  x      /  ALT  x      /  AlkPhos  x            Urine Studies:          RADIOLOGY & ADDITIONAL STUDIES:

## 2021-12-01 NOTE — PROGRESS NOTE ADULT - ASSESSMENT
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    NATALIA on CKD with HD dependence with MRSA bacteremia    Last HD 11/24, patient left AMA here with stigmata of vol Ol and hyperkalemia now  s/p shily - await ID clearance for permacath reinsertion - await Blood cx results   continue HD support TTS     Anemia   s/p PRBC with HD last admit   TAMARA w HD to resume    HTN  -Oral meds    ANCA Vasculitis  -Still on prednisone   rheum followup     d/w Dr Gill and Dr Harrington      ** pt seen earlier today

## 2021-12-01 NOTE — DIETITIAN INITIAL EVALUATION ADULT. - PERTINENT MEDS FT
MEDICATIONS  (STANDING):  amLODIPine   Tablet 10 milliGRAM(s) Oral daily  artificial  tears Solution 1 Drop(s) Both EYES two times a day  atovaquone  Suspension 1500 milliGRAM(s) Oral daily  chlorhexidine 4% Liquid 1 Application(s) Topical <User Schedule>  cloNIDine 0.1 milliGRAM(s) Oral daily  epoetin amee-epbx (RETACRIT) Injectable 49698 Unit(s) IV Push <User Schedule>  gabapentin 100 milliGRAM(s) Oral three times a day  heparin   Injectable 5000 Unit(s) SubCutaneous every 12 hours  heparin   Injectable. 1000 Unit(s) Dialysis. <User Schedule>  heparin   Injectable. 500 Unit(s) Dialysis. <User Schedule>  hydroxychloroquine 200 milliGRAM(s) Oral daily  influenza   Vaccine 0.5 milliLiter(s) IntraMuscular once  pantoprazole    Tablet 40 milliGRAM(s) Oral before breakfast  predniSONE   Tablet 50 milliGRAM(s) Oral daily    MEDICATIONS  (PRN):  acetaminophen     Tablet .. 650 milliGRAM(s) Oral every 6 hours PRN Temp greater or equal to 38C (100.4F), Mild Pain (1 - 3)  loperamide 2 milliGRAM(s) Oral three times a day PRN Diarrhea  melatonin 3 milliGRAM(s) Oral at bedtime PRN Insomnia  ondansetron Injectable 4 milliGRAM(s) IV Push every 8 hours PRN Nausea and/or Vomiting  oxycodone    5 mG/acetaminophen 325 mG 2 Tablet(s) Oral every 6 hours PRN Severe Pain (7 - 10)  sodium chloride 0.9% lock flush 10 milliLiter(s) IV Push every 1 hour PRN Pre/post blood products, medications, blood draw, and to maintain line patency

## 2021-12-01 NOTE — DIETITIAN INITIAL EVALUATION ADULT. - PERTINENT LABORATORY DATA
12-01    134<L>  |  102  |  89<H>  ----------------------------<  88  4.5   |  19<L>  |  5.36<H>    Ca    7.8<L>      01 Dec 2021 07:48  Phos  7.2     12-01    TPro  x   /  Alb  2.9<L>  /  TBili  x   /  DBili  x   /  AST  x   /  ALT  x   /  AlkPhos  x   12-01  BMI: BMI (kg/m2): 19.7 (11-30-21 @ 20:59)  HbA1c:   Glucose: POCT Blood Glucose.: 106 mg/dL (11-30-21 @ 11:28)    BP: 155/110 (12-01-21 @ 09:59) (107/75 - 159/109)  Lipid Panel: Date/Time: 10-12-21 @ 09:54  Cholesterol, Serum: 137  Direct LDL: --  HDL Cholesterol, Serum: 14  Total Cholesterol/HDL Ration Measurement: --  Triglycerides, Serum: 470  Vitamin D, 25-Hydroxy: 8.2 ng/mL (10-25-21 @ 15:49)  Iron Total, Serum: 172 ug/dL (10-25-21 @ 12:19)

## 2021-12-01 NOTE — DIETITIAN INITIAL EVALUATION ADULT. - PHYSCIAL ASSESSMENT
underweight john paul score of 20; unstageable PU on sacrum; blister on Lt/Rt lower leg  no BM documented

## 2021-12-01 NOTE — DIETITIAN INITIAL EVALUATION ADULT. - ADD RECOMMEND
1) add nepro TID 2) add vitamin D supplementation 3) add neprhovite daily to ensure 100% RDI met 4) daily wt checks to track/trend changes 5) encourage therapeutic diet compliance 1) change diet to renal and add nepro TID 2) add vitamin D supplementation 3) add neprhovite daily to ensure 100% RDI met 4) daily wt checks to track/trend changes 5) encourage therapeutic diet compliance

## 2021-12-01 NOTE — CONSULT NOTE ADULT - SUBJECTIVE AND OBJECTIVE BOX
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 Patient is poor historian.           PMH: as above  PSH: as above  Meds: per reconciliation sheet, noted below  MEDICATIONS  (STANDING):  amLODIPine   Tablet 10 milliGRAM(s) Oral daily  artificial  tears Solution 1 Drop(s) Both EYES two times a day  atovaquone  Suspension 1500 milliGRAM(s) Oral daily  chlorhexidine 4% Liquid 1 Application(s) Topical <User Schedule>  cloNIDine 0.1 milliGRAM(s) Oral daily  DAPTOmycin IVPB 350 milliGRAM(s) IV Intermittent every 48 hours  epoetin amee-epbx (RETACRIT) Injectable 50552 Unit(s) IV Push <User Schedule>  gabapentin 100 milliGRAM(s) Oral three times a day  heparin   Injectable 5000 Unit(s) SubCutaneous every 12 hours  heparin   Injectable. 1000 Unit(s) Dialysis. <User Schedule>  heparin   Injectable. 500 Unit(s) Dialysis. <User Schedule>  hydroxychloroquine 200 milliGRAM(s) Oral daily  influenza   Vaccine 0.5 milliLiter(s) IntraMuscular once  pantoprazole    Tablet 40 milliGRAM(s) Oral before breakfast  predniSONE   Tablet 50 milliGRAM(s) Oral daily    MEDICATIONS  (PRN):  acetaminophen     Tablet .. 650 milliGRAM(s) Oral every 6 hours PRN Temp greater or equal to 38C (100.4F), Mild Pain (1 - 3)  loperamide 2 milliGRAM(s) Oral three times a day PRN Diarrhea  melatonin 3 milliGRAM(s) Oral at bedtime PRN Insomnia  ondansetron Injectable 4 milliGRAM(s) IV Push every 8 hours PRN Nausea and/or Vomiting  oxycodone    5 mG/acetaminophen 325 mG 2 Tablet(s) Oral every 6 hours PRN Severe Pain (7 - 10)  sodium chloride 0.9% lock flush 10 milliLiter(s) IV Push every 1 hour PRN Pre/post blood products, medications, blood draw, and to maintain line patency    Allergies    morphine (Rash)  morphine (Unknown)    Intolerances      Social: no smoking, no alcohol, uses heroin; no recent travel, no exposure to TB  FAMILY HISTORY:  Family history of cardiomyopathy (Mother)       ROs unable to obtain secondary to patient medical condition     Vital Signs Last 24 Hrs  T(C): 36.9 (01 Dec 2021 16:52), Max: 36.9 (01 Dec 2021 16:52)  T(F): 98.5 (01 Dec 2021 16:52), Max: 98.5 (01 Dec 2021 16:52)  HR: 101 (01 Dec 2021 16:52) (89 - 106)  BP: 146/102 (01 Dec 2021 16:52) (134/88 - 159/109)  BP(mean): --  RR: 18 (01 Dec 2021 16:52) (14 - 20)  SpO2: 100% (01 Dec 2021 16:52) (95% - 100%)  Daily     Daily     PE:    Constitutional: frail looking  HEENT: NC/AT, EOMI, PERRLA, conjunctivae clear; ears and nose atraumatic; pharynx clear; poor dentition  Neck: supple; thyroid not palpable  Back: no tenderness  Respiratory: respiratory effort normal; clear to auscultation  Cardiovascular: S1S2 regular, no murmurs  Abdomen: soft, not tender, not distended, positive BS; no liver or spleen organomegaly  Genitourinary: no suprapubic tenderness  Musculoskeletal: no muscle tenderness, no joint swelling or tenderness  Neurological/ Psychiatric:   moving all extremities  Skin: no rashes; no palpable lesions; too numerous to count track marks    Labs: all available labs reviewed                        9.4    13.62 )-----------( 265      ( 01 Dec 2021 07:49 )             29.5     12-01    134<L>  |  102  |  89<H>  ----------------------------<  88  4.5   |  19<L>  |  5.36<H>    Ca    7.8<L>      01 Dec 2021 07:48  Phos  7.2     12-01    TPro  x   /  Alb  2.9<L>  /  TBili  x   /  DBili  x   /  AST  x   /  ALT  x   /  AlkPhos  x   12-01     LIVER FUNCTIONS - ( 01 Dec 2021 07:48 )  Alb: 2.9 g/dL / Pro: x     / ALK PHOS: x     / ALT: x     / AST: x     / GGT: x                 Radiology: all available radiological tests reviewed    Advanced directives addressed: full resuscitation
  42 y/o F with PMH bacteremia, IVDA, COPD, depression, epilepsy, GERD, Raynaud's syndrome, heroin abuse, HTN, lupus, RA, sepsis, SLE, smoker, NATALIA on CKD 4  (initiated on HD about 2 months ago with tunneled HD catheter placed 10/22/21) w kidney bx proven ANCA vasculitis presumed sec to cociaine, heroin abuse ( Cocaine + on drug tox) now returns after recent mrsa bacteremia/catheter site tenderness. Last HD was the 24th, left AMA without any hd access. Patient did not return until today. Here now due to congstion, sob.       PAST MEDICAL & SURGICAL HISTORY:  Lupus    Rheumatoid arthritis    H/O Raynaud&#x27;s syndrome    Heroin abuse    Smoker    Rheumatoid arthritis    Sepsis    HTN (hypertension)    COPD (chronic obstructive pulmonary disease)    Depression    Epilepsy    GERD (gastroesophageal reflux disease)    Raynaud&#x27;s syndrome without gangrene    Bacteremia    Systemic lupus erythematosus    Aphonia    H/O tubal ligation    H/O appendicitis    Gall bladder stones    H/O tracheostomy    S/P percutaneous endoscopic gastrostomy (PEG) tube placement        MEDICATIONS  (STANDING):  epoetin amee-epbx (RETACRIT) Injectable 16329 Unit(s) IV Push <User Schedule>  heparin   Injectable. 1000 Unit(s) Dialysis. <User Schedule>  heparin   Injectable. 500 Unit(s) Dialysis. <User Schedule>    MEDICATIONS  (PRN):  acetaminophen     Tablet .. 650 milliGRAM(s) Oral every 6 hours PRN Temp greater or equal to 38C (100.4F), Mild Pain (1 - 3)  melatonin 3 milliGRAM(s) Oral at bedtime PRN Insomnia  ondansetron Injectable 4 milliGRAM(s) IV Push every 8 hours PRN Nausea and/or Vomiting      Allergies    morphine (Rash)  morphine (Unknown)    Intolerances        SOCIAL HISTORY:    FAMILY HISTORY:  Family history of cardiomyopathy (Mother)        REVIEW OF SYSTEMS:    CONSTITUTIONAL: stable weakness, no fevers or chills  EYES/ENT: No visual changes;  No vertigo or throat pain   NECK: No pain or stiffness  RESPIRATORY: stable cough, wheezing, hemoptysis; stable shortness of breath  CARDIOVASCULAR: No chest pain or palpitations  GASTROINTESTINAL: No abdominal or epigastric pain. No nausea, vomiting, or hematemesis; No diarrhea or constipation. No melena or hematochezia.  GENITOURINARY: No dysuria, frequency or hematuria  NEUROLOGICAL: No numbness or weakness  SKIN: No itching, burning, rashes, or lesions   All other review of systems is negative unless indicated above.      T(C): , Max: 37.1 (11-29-21 @ 22:15)  T(F): , Max: 98.8 (11-29-21 @ 22:15)  HR: 116 (11-30-21 @ 11:34)  BP: 115/74 (11-30-21 @ 11:52)  BP(mean): 111 (11-30-21 @ 11:34)  RR: 17 (11-30-21 @ 11:34)  SpO2: 100% (11-30-21 @ 11:34)  Wt(kg): --    Height (cm): 165.1 (11-29 @ 21:54)  Weight (kg): 52.2 (11-29 @ 21:54)  BMI (kg/m2): 19.2 (11-29 @ 21:54)  BSA (m2): 1.56 (11-29 @ 21:54)    PHYSICAL EXAM:    Constitutional: unkempt, poor hygeine  HEENT: >>then stated age  Neck: No LAD, No JVD  Respiratory: dist  Cardiovascular: S1 and S2  Extremities: No peripheral edema  Neurological: A/O x 3, no focal deficits  Psychiatric: Normal mood, normal affect  : No Salgado  Skin: No rashes  Access: Not applicable        LABS:                        8.7    11.12 )-----------( 282      ( 29 Nov 2021 23:48 )             28.1     30 Nov 2021 09:12    135    |  105    |  158    ----------------------------<  243    4.9     |  14     |  7.65   29 Nov 2021 23:48    134    |  105    |  159    ----------------------------<  119    6.1     |  14     |  7.59     Ca    7.5        30 Nov 2021 09:12  Ca    7.4        29 Nov 2021 23:48    TPro  7.1    /  Alb  3.0    /  TBili  0.3    /  DBili  x      /  AST  11     /  ALT  23     /  AlkPhos  78     29 Nov 2021 23:48          Urine Studies:          RADIOLOGY & ADDITIONAL STUDIES:

## 2021-12-02 LAB
ANION GAP SERPL CALC-SCNC: 18 MMOL/L — HIGH (ref 5–17)
BUN SERPL-MCNC: 106 MG/DL — HIGH (ref 7–23)
CALCIUM SERPL-MCNC: 7.6 MG/DL — LOW (ref 8.5–10.1)
CHLORIDE SERPL-SCNC: 104 MMOL/L — SIGNIFICANT CHANGE UP (ref 96–108)
CO2 SERPL-SCNC: 14 MMOL/L — LOW (ref 22–31)
CREAT SERPL-MCNC: 6.53 MG/DL — HIGH (ref 0.5–1.3)
GLUCOSE SERPL-MCNC: 130 MG/DL — HIGH (ref 70–99)
HCT VFR BLD CALC: 23 % — LOW (ref 34.5–45)
HCT VFR BLD CALC: 24.6 % — LOW (ref 34.5–45)
HGB BLD-MCNC: 7.3 G/DL — LOW (ref 11.5–15.5)
HGB BLD-MCNC: 7.8 G/DL — LOW (ref 11.5–15.5)
MCHC RBC-ENTMCNC: 28.8 PG — SIGNIFICANT CHANGE UP (ref 27–34)
MCHC RBC-ENTMCNC: 31.7 GM/DL — LOW (ref 32–36)
MCV RBC AUTO: 90.8 FL — SIGNIFICANT CHANGE UP (ref 80–100)
PLATELET # BLD AUTO: 254 K/UL — SIGNIFICANT CHANGE UP (ref 150–400)
POTASSIUM SERPL-MCNC: 4.3 MMOL/L — SIGNIFICANT CHANGE UP (ref 3.5–5.3)
POTASSIUM SERPL-SCNC: 4.3 MMOL/L — SIGNIFICANT CHANGE UP (ref 3.5–5.3)
RBC # BLD: 2.71 M/UL — LOW (ref 3.8–5.2)
RBC # FLD: 18.9 % — HIGH (ref 10.3–14.5)
SODIUM SERPL-SCNC: 136 MMOL/L — SIGNIFICANT CHANGE UP (ref 135–145)
WBC # BLD: 11.2 K/UL — HIGH (ref 3.8–10.5)
WBC # FLD AUTO: 11.2 K/UL — HIGH (ref 3.8–10.5)

## 2021-12-02 PROCEDURE — 99233 SBSQ HOSP IP/OBS HIGH 50: CPT

## 2021-12-02 RX ADMIN — OXYCODONE AND ACETAMINOPHEN 2 TABLET(S): 5; 325 TABLET ORAL at 18:41

## 2021-12-02 RX ADMIN — DAPTOMYCIN 114 MILLIGRAM(S): 500 INJECTION, POWDER, LYOPHILIZED, FOR SOLUTION INTRAVENOUS at 17:20

## 2021-12-02 RX ADMIN — OXYCODONE AND ACETAMINOPHEN 2 TABLET(S): 5; 325 TABLET ORAL at 01:22

## 2021-12-02 RX ADMIN — OXYCODONE AND ACETAMINOPHEN 2 TABLET(S): 5; 325 TABLET ORAL at 12:47

## 2021-12-02 RX ADMIN — GABAPENTIN 100 MILLIGRAM(S): 400 CAPSULE ORAL at 06:27

## 2021-12-02 RX ADMIN — Medication 200 MILLIGRAM(S): at 17:20

## 2021-12-02 RX ADMIN — ERYTHROPOIETIN 10000 UNIT(S): 10000 INJECTION, SOLUTION INTRAVENOUS; SUBCUTANEOUS at 15:22

## 2021-12-02 RX ADMIN — OXYCODONE AND ACETAMINOPHEN 2 TABLET(S): 5; 325 TABLET ORAL at 13:15

## 2021-12-02 RX ADMIN — Medication 1 DROP(S): at 21:21

## 2021-12-02 RX ADMIN — ATOVAQUONE 1500 MILLIGRAM(S): 750 SUSPENSION ORAL at 17:20

## 2021-12-02 RX ADMIN — GABAPENTIN 100 MILLIGRAM(S): 400 CAPSULE ORAL at 17:21

## 2021-12-02 RX ADMIN — OXYCODONE AND ACETAMINOPHEN 2 TABLET(S): 5; 325 TABLET ORAL at 06:26

## 2021-12-02 RX ADMIN — Medication 50 MILLIGRAM(S): at 17:19

## 2021-12-02 RX ADMIN — Medication 3 MILLIGRAM(S): at 21:20

## 2021-12-02 RX ADMIN — Medication 0.1 MILLIGRAM(S): at 15:02

## 2021-12-02 RX ADMIN — GABAPENTIN 100 MILLIGRAM(S): 400 CAPSULE ORAL at 21:20

## 2021-12-02 RX ADMIN — PANTOPRAZOLE SODIUM 40 MILLIGRAM(S): 20 TABLET, DELAYED RELEASE ORAL at 06:26

## 2021-12-02 RX ADMIN — OXYCODONE AND ACETAMINOPHEN 2 TABLET(S): 5; 325 TABLET ORAL at 00:22

## 2021-12-02 RX ADMIN — AMLODIPINE BESYLATE 10 MILLIGRAM(S): 2.5 TABLET ORAL at 15:02

## 2021-12-02 RX ADMIN — HEPARIN SODIUM 5000 UNIT(S): 5000 INJECTION INTRAVENOUS; SUBCUTANEOUS at 21:21

## 2021-12-02 NOTE — PROGRESS NOTE ADULT - ASSESSMENT
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 Found to have K of 6.1. She denies any cp/n/v. c/o some SOB and leg edema.         1) Hyperkalemia 6.1  sp lokelma, insulin cocktaol and HD  plan for HD TTS scheduling  Plan for TDC once cleared from ID persepctive  Pt left AMA last week after she was currently under tx for mrsa bacteremia with dapto  discussed with Dr. Soni for HD  cxr clear  BMP in am  Can dc tele monitoring    2) ESRD: need HD:  called IR and consult placed for HD catheter  discussed with Dr. Soni for HD  cxr clear    3) HTN: home meds    4) Lupus: home meds    5) MRSA bacteremia/IVDA  Line removed during last admission  echo w/o veg  RIJ placed this admission  Most recent Bcx negative  ID to discuss duration of dapto and clearance for TDC    5) DVT PPX: venodynes , heparin s/q    poc discussed with pt, team, Dr. Soni.       GOC and advance care planning meeting done with the patient. She wishes to be fully resuscitated and mechanically ventilated if need arises. There are no limitations of any sort in her medical care and management. She is FULL CODE. Additional time spent 19 min.  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 SLE   suspected 2/2 IVDA  C/w Prednisone 50mg   C/w Plaquenil   F/u with Rheum outPt     4) MRSA bacteremia/IVDA  Last GRAHAM neg for vegetations   RIJ placed this admission  Most recent Bcx negative  ID eval appreciated       5) HTN: C/w Norvasc and Clonidine     6) DVT PPX: venodynes , heparin s/q          ACP: FULL CODE.

## 2021-12-02 NOTE — PROGRESS NOTE ADULT - SUBJECTIVE AND OBJECTIVE BOX
40 y/o F with PMH bacteremia, IVDA, COPD, depression, epilepsy, GERD, Raynaud's syndrome, heroin abuse, HTN, lupus, RA, sepsis, SLE, smoker, NATALIA on CKD 4  (initiated on HD about 2 months ago with tunneled HD catheter placed 10/22/21) w kidney bx proven ANCA vasculitis presumed sec to cociaine, heroin abuse ( Cocaine + on drug tox) now returns after recent mrsa bacteremia/catheter site tenderness. Last HD was the 24th, left AMA without any hd access.   temp shiley placed and HD on 11/30     ** seen earlier today   on HD   denies bleeding  diarrhea          PAST MEDICAL & SURGICAL HISTORY:  Lupus    Rheumatoid arthritis    H/O Raynaud&#x27;s syndrome    Heroin abuse    Smoker    Rheumatoid arthritis    Sepsis    HTN (hypertension)    COPD (chronic obstructive pulmonary disease)    Depression    Epilepsy    GERD (gastroesophageal reflux disease)    Raynaud&#x27;s syndrome without gangrene    Bacteremia    Systemic lupus erythematosus    Aphonia    H/O tubal ligation    H/O appendicitis    Gall bladder stones    H/O tracheostomy    S/P percutaneous endoscopic gastrostomy (PEG) tube placement      MEDICATIONS  (STANDING):  amLODIPine   Tablet 10 milliGRAM(s) Oral daily  artificial  tears Solution 1 Drop(s) Both EYES two times a day  atovaquone  Suspension 1500 milliGRAM(s) Oral daily  chlorhexidine 4% Liquid 1 Application(s) Topical <User Schedule>  cloNIDine 0.1 milliGRAM(s) Oral daily  DAPTOmycin IVPB 350 milliGRAM(s) IV Intermittent every 48 hours  epoetin amee-epbx (RETACRIT) Injectable 16824 Unit(s) IV Push <User Schedule>  gabapentin 100 milliGRAM(s) Oral three times a day  heparin   Injectable 5000 Unit(s) SubCutaneous every 12 hours  heparin   Injectable. 1000 Unit(s) Dialysis. <User Schedule>  hydroxychloroquine 200 milliGRAM(s) Oral daily  influenza   Vaccine 0.5 milliLiter(s) IntraMuscular once  pantoprazole    Tablet 40 milliGRAM(s) Oral before breakfast  predniSONE   Tablet 50 milliGRAM(s) Oral daily      Allergies    morphine (Rash)  morphine (Unknown)    Intolerances        SOCIAL HISTORY:    FAMILY HISTORY:  Family history of cardiomyopathy (Mother)        REVIEW OF SYSTEMS:    CONSTITUTIONAL: stable weakness, no fevers or chills  EYES/ENT: No visual changes;  No vertigo or throat pain   NECK: No pain or stiffness  RESPIRATORY: stable cough, wheezing, hemoptysis; stable shortness of breath  CARDIOVASCULAR: No chest pain or palpitations  GASTROINTESTINAL: No abdominal or epigastric pain. No nausea, vomiting, or hematemesis; No diarrhea or constipation. No melena or hematochezia.  GENITOURINARY: No dysuria, frequency or hematuria  NEUROLOGICAL: No numbness or weakness  SKIN: No itching, burning, rashes, or lesions   All other review of systems is negative unless indicated above.      Vital Signs Last 24 Hrs  T(C): 36.8 (02 Dec 2021 23:15), Max: 37.1 (02 Dec 2021 08:57)  T(F): 98.3 (02 Dec 2021 23:15), Max: 98.8 (02 Dec 2021 08:57)  HR: 89 (02 Dec 2021 23:15) (88 - 101)  BP: 135/94 (02 Dec 2021 23:15) (132/85 - 170/103)  BP(mean): --  RR: 18 (02 Dec 2021 23:15) (16 - 20)  SpO2: 99% (02 Dec 2021 08:57) (99% - 100%)    PHYSICAL EXAM:    Constitutional: unkempt, poor hygeine  HEENT: >>then stated age  Neck: No LAD, No JVD  Respiratory: dist  Cardiovascular: S1 and S2  Extremities: + peripheral edema  Neurological: A/O x 3, no focal deficits  : No Salgado  Skin: No rashes  Access: [facility]         LABS:                                        7.3    x     )-----------( x        ( 02 Dec 2021 11:46 )             23.0                         7.8    11.20 )-----------( 254      ( 02 Dec 2021 09:12 )             24.6       136    |  104    |  106    ----------------------------<  130       02 Dec 2021 09:12  4.3     |  14     |  6.53     134    |  102    |  89     ----------------------------<  88        01 Dec 2021 07:48  4.5     |  19     |  5.36     136    |  105    |  165    ----------------------------<  94        30 Nov 2021 17:02  4.6     |  16     |  7.62     Ca    7.6        02 Dec 2021 09:12  Ca    7.8        01 Dec 2021 07:48    Phos  7.2       01 Dec 2021 07:48  Phos  9.6       30 Nov 2021 17:02      TPro  x      /  Alb  2.9    /  TBili  x      /        01 Dec 2021 07:48  DBili  x      /  AST  x      /  ALT  x      /  AlkPhos  x        TPro  x      /  Alb  3.2    /  TBili  x      /        30 Nov 2021 17:02  DBili  x      /  AST  x      /  ALT  x      /  AlkPhos  x            Urine Studies:          RADIOLOGY & ADDITIONAL STUDIES:

## 2021-12-02 NOTE — PROGRESS NOTE ADULT - ASSESSMENT
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    NATALIA on CKD with HD dependence with MRSA bacteremia    Last HD 11/24, patient left AMA here with stigmata of vol Ol and hyperkalemia now  s/p shily - await ID clearance for permacath reinsertion - await Blood cx results   continue HD support TTS     Anemia   s/p PRBC with HD last admit- PRBC today    monitor trends   TAMARA w HD to resume    HTN  -Oral meds    ANCA Vasculitis  -Still on prednisone   rheum followup      ** pt seen earlier today

## 2021-12-02 NOTE — PROGRESS NOTE ADULT - SUBJECTIVE AND OBJECTIVE BOX
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 Found to have K of 6.1. She denies any cp/n/v. c/o some SOB and leg edema.       (30 Nov 2021 14:30)    INTERVAL HPI/OVERNIGHT EVENTS:    Vital Signs Last 24 Hrs  T(C): 36.9 (02 Dec 2021 13:15), Max: 37.1 (02 Dec 2021 08:57)  T(F): 98.4 (02 Dec 2021 13:15), Max: 98.8 (02 Dec 2021 08:57)  HR: 92 (02 Dec 2021 13:15) (92 - 101)  BP: 141/85 (02 Dec 2021 13:15) (132/85 - 155/94)  BP(mean): --  RR: 16 (02 Dec 2021 13:15) (16 - 20)  SpO2: 99% (02 Dec 2021 08:57) (99% - 100%)  I&O's Detail    REVIEW OF SYSTEMS:    CONSTITUTIONAL: No weakness, fevers or chills  EYES/ENT: No visual changes;  No vertigo or throat pain   NECK: No pain or stiffness  RESPIRATORY: No cough, wheezing, hemoptysis; No shortness of breath  CARDIOVASCULAR: No chest pain or palpitations  GASTROINTESTINAL: No abdominal or epigastric pain. No nausea, vomiting, or hematemesis; No diarrhea or constipation. No melena or hematochezia.  GENITOURINARY: No dysuria, frequency or hematuria  NEUROLOGICAL: No numbness or weakness  SKIN: No itching, burning, rashes, or lesions   All other review of systems is negative unless indicated above.  PHYSICAL EXAM:    General: Well developed; well nourished; in no acute distress  Eyes: PERRLA, EOMI; conjunctiva and sclera clear  Head: Normocephalic; atraumatic  ENMT: No nasal discharge; airway clear  Neck: Supple; non tender; no masses  Respiratory: No wheezes, rales or rhonchi  Cardiovascular: Regular rate and rhythm. S1 and S2 Normal; No murmurs, gallops or rubs  Gastrointestinal: Soft non-tender non-distended; Normal bowel sounds  Genitourinary: No  suprapubic  tenderness  Extremities: Normal range of motion, No clubbing, cyanosis or edema  Vascular: Peripheral pulses palpable 2+ bilaterally  Neurological: Alert and oriented x4  Skin: Warm and dry. No acute rash  Lymph Nodes: No acute cervical adenopathy  Musculoskeletal: Normal muscle tone, without deformities  Psychiatric: Cooperative and appropriate                            7.3    x     )-----------( x        ( 02 Dec 2021 11:46 )             23.0     02 Dec 2021 09:12    136    |  104    |  106    ----------------------------<  130    4.3     |  14     |  6.53     Ca    7.6        02 Dec 2021 09:12  Phos  7.2       01 Dec 2021 07:48    TPro  x      /  Alb  2.9    /  TBili  x      /  DBili  x      /  AST  x      /  ALT  x      /  AlkPhos  x      01 Dec 2021 07:48      CAPILLARY BLOOD GLUCOSE        LIVER FUNCTIONS - ( 01 Dec 2021 07:48 )  Alb: 2.9 g/dL / Pro: x     / ALK PHOS: x     / ALT: x     / AST: x     / GGT: x               MEDICATIONS  (STANDING):  amLODIPine   Tablet 10 milliGRAM(s) Oral daily  artificial  tears Solution 1 Drop(s) Both EYES two times a day  atovaquone  Suspension 1500 milliGRAM(s) Oral daily  chlorhexidine 4% Liquid 1 Application(s) Topical <User Schedule>  cloNIDine 0.1 milliGRAM(s) Oral daily  DAPTOmycin IVPB 350 milliGRAM(s) IV Intermittent every 48 hours  epoetin amee-epbx (RETACRIT) Injectable 63173 Unit(s) IV Push <User Schedule>  gabapentin 100 milliGRAM(s) Oral three times a day  heparin   Injectable 5000 Unit(s) SubCutaneous every 12 hours  heparin   Injectable. 1000 Unit(s) Dialysis. <User Schedule>  hydroxychloroquine 200 milliGRAM(s) Oral daily  influenza   Vaccine 0.5 milliLiter(s) IntraMuscular once  pantoprazole    Tablet 40 milliGRAM(s) Oral before breakfast  predniSONE   Tablet 50 milliGRAM(s) Oral daily    MEDICATIONS  (PRN):  acetaminophen     Tablet .. 650 milliGRAM(s) Oral every 6 hours PRN Temp greater or equal to 38C (100.4F), Mild Pain (1 - 3)  loperamide 2 milliGRAM(s) Oral three times a day PRN Diarrhea  melatonin 3 milliGRAM(s) Oral at bedtime PRN Insomnia  ondansetron Injectable 4 milliGRAM(s) IV Push every 8 hours PRN Nausea and/or Vomiting  oxycodone    5 mG/acetaminophen 325 mG 2 Tablet(s) Oral every 6 hours PRN Severe Pain (7 - 10)  sodium chloride 0.9% lock flush 10 milliLiter(s) IV Push every 1 hour PRN Pre/post blood products, medications, blood draw, and to maintain line patency      RADIOLOGY & ADDITIONAL TESTS: CC: for HD, Hyperkalemia (01 Dec 2021 18:03)    HPI:  41/ F with PMHx of  bacteremia,  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 Found to have K of 6.1. She denies any cp/n/v. c/o some SOB and leg edema.       INTERVAL HPI/ OVERNIGHT EVENTS: chart reviewed, Pt was seen and examined in HD unit, tolerated dialysis well, some abd cramps, feels like gonna have diarrhea. Denies bloody BMs, No SOB. No fevers     Vital Signs Last 24 Hrs  T(C): 36.9 (02 Dec 2021 13:15), Max: 37.1 (02 Dec 2021 08:57)  T(F): 98.4 (02 Dec 2021 13:15), Max: 98.8 (02 Dec 2021 08:57)  HR: 92 (02 Dec 2021 13:15) (92 - 101)  BP: 141/85 (02 Dec 2021 13:15) (132/85 - 155/94)  RR: 16 (02 Dec 2021 13:15) (16 - 20)  SpO2: 99% (02 Dec 2021 08:57) (99% - 100%)    REVIEW OF SYSTEMS:  All other review of systems is negative unless indicated above.      PHYSICAL EXAM:  General: Well developed; looks chronically ill,  in no acute distress  Eyes:  EOMI; conjunctiva and sclera clear  Head: Normocephalic; atraumatic  ENMT: No nasal discharge; airway clear  Neck: Supple; non tender; no masses  Respiratory: No wheezes, rales or rhonchi  Cardiovascular: Regular rate and rhythm. S1 and S2 Normal;  Gastrointestinal: Soft non-tender non-distended; Normal bowel sounds  Genitourinary: No  suprapubic  tenderness  Extremities: + edema  Vascular: Peripheral pulses palpable 2+ bilaterally  Neurological: Alert and oriented x3, non focal   Skin: Warm and dry.   Musculoskeletal: Normal muscle tone  Psychiatric: Cooperative            LABS:                     7.3    x     )-----------( x        ( 02 Dec 2021 11:46 )             23.0     02 Dec 2021 09:12    136    |  104    |  106    ----------------------------<  130    4.3     |  14     |  6.53     Ca    7.6        02 Dec 2021 09:12  Phos  7.2       01 Dec 2021 07:48    TPro  x      /  Alb  2.9    /  TBili  x      /  DBili  x      /  AST  x      /  ALT  x      /  AlkPhos  x      01 Dec 2021 07:48        LIVER FUNCTIONS - ( 01 Dec 2021 07:48 )  Alb: 2.9 g/dL / Pro: x     / ALK PHOS: x     / ALT: x     / AST: x     / GGT: x         `    MEDICATIONS  (STANDING):  amLODIPine   Tablet 10 milliGRAM(s) Oral daily  artificial  tears Solution 1 Drop(s) Both EYES two times a day  atovaquone  Suspension 1500 milliGRAM(s) Oral daily  chlorhexidine 4% Liquid 1 Application(s) Topical <User Schedule>  cloNIDine 0.1 milliGRAM(s) Oral daily  DAPTOmycin IVPB 350 milliGRAM(s) IV Intermittent every 48 hours  epoetin amee-epbx (RETACRIT) Injectable 67880 Unit(s) IV Push <User Schedule>  gabapentin 100 milliGRAM(s) Oral three times a day  heparin   Injectable 5000 Unit(s) SubCutaneous every 12 hours  heparin   Injectable. 1000 Unit(s) Dialysis. <User Schedule>  hydroxychloroquine 200 milliGRAM(s) Oral daily  influenza   Vaccine 0.5 milliLiter(s) IntraMuscular once  pantoprazole    Tablet 40 milliGRAM(s) Oral before breakfast  predniSONE   Tablet 50 milliGRAM(s) Oral daily    MEDICATIONS  (PRN):  acetaminophen     Tablet .. 650 milliGRAM(s) Oral every 6 hours PRN Temp greater or equal to 38C (100.4F), Mild Pain (1 - 3)  loperamide 2 milliGRAM(s) Oral three times a day PRN Diarrhea  melatonin 3 milliGRAM(s) Oral at bedtime PRN Insomnia  ondansetron Injectable 4 milliGRAM(s) IV Push every 8 hours PRN Nausea and/or Vomiting  oxycodone    5 mG/acetaminophen 325 mG 2 Tablet(s) Oral every 6 hours PRN Severe Pain (7 - 10)  sodium chloride 0.9% lock flush 10 milliLiter(s) IV Push every 1 hour PRN Pre/post blood products, medications, blood draw, and to maintain line patency      RADIOLOGY & ADDITIONAL TESTS:      EXAM:  XR CHEST PORTABLE URGENT 1V                            PROCEDURE DATE:  11/29/2021          INTERPRETATION:  AP chest on November 29, 2021 at 10:55 PM. Patient is scheduled for admission.    Heart magnified by technique.    Lung fields and pleural surfaces are unremarkable.    Advanced left shoulder degeneration again noted.    Jugular line seen on November 15 is been removed.    IMPRESSION: Clear lungs. Other findings as above.        EXAM:  ECHO TTE WO CON COMP W DOPP    PROCEDURE DATE:  11/16/2021     Summary     The mitral valve leaflets appear normal; there is no evidence of stenosis,   fluttering or prolapse.   Mild (1+) mitral regurgitation is present.   Normal aortic valve structure and function.   The tricuspid valve leaflets are thin and pliable; valve motion is normal.   Mild (1+) tricuspid valve regurgitation is present.   Normal appearing pulmonic valve structure.   Trace pulmonic valvular regurgitation is present.   The left atrium appears normal.   Estimated left ventricular ejection fraction is 50-55 %.   Mild concentric left ventricular hypertrophy is present.   Pleural effusion cannot be ruled out.

## 2021-12-03 LAB
ALBUMIN SERPL ELPH-MCNC: 2.5 G/DL — LOW (ref 3.3–5)
ANION GAP SERPL CALC-SCNC: 13 MMOL/L — SIGNIFICANT CHANGE UP (ref 5–17)
BUN SERPL-MCNC: 46 MG/DL — HIGH (ref 7–23)
CALCIUM SERPL-MCNC: 7.6 MG/DL — LOW (ref 8.5–10.1)
CHLORIDE SERPL-SCNC: 101 MMOL/L — SIGNIFICANT CHANGE UP (ref 96–108)
CO2 SERPL-SCNC: 21 MMOL/L — LOW (ref 22–31)
CREAT SERPL-MCNC: 4.07 MG/DL — HIGH (ref 0.5–1.3)
GLUCOSE SERPL-MCNC: 120 MG/DL — HIGH (ref 70–99)
HCT VFR BLD CALC: 31.3 % — LOW (ref 34.5–45)
HGB BLD-MCNC: 10.4 G/DL — LOW (ref 11.5–15.5)
MCHC RBC-ENTMCNC: 29.5 PG — SIGNIFICANT CHANGE UP (ref 27–34)
MCHC RBC-ENTMCNC: 33.2 GM/DL — SIGNIFICANT CHANGE UP (ref 32–36)
MCV RBC AUTO: 88.9 FL — SIGNIFICANT CHANGE UP (ref 80–100)
OB PNL STL: NEGATIVE — SIGNIFICANT CHANGE UP
PHOSPHATE SERPL-MCNC: 4.9 MG/DL — HIGH (ref 2.5–4.5)
PLATELET # BLD AUTO: 247 K/UL — SIGNIFICANT CHANGE UP (ref 150–400)
POTASSIUM SERPL-MCNC: 3.8 MMOL/L — SIGNIFICANT CHANGE UP (ref 3.5–5.3)
POTASSIUM SERPL-SCNC: 3.8 MMOL/L — SIGNIFICANT CHANGE UP (ref 3.5–5.3)
RBC # BLD: 3.52 M/UL — LOW (ref 3.8–5.2)
RBC # FLD: 17.9 % — HIGH (ref 10.3–14.5)
SODIUM SERPL-SCNC: 135 MMOL/L — SIGNIFICANT CHANGE UP (ref 135–145)
WBC # BLD: 10.33 K/UL — SIGNIFICANT CHANGE UP (ref 3.8–10.5)
WBC # FLD AUTO: 10.33 K/UL — SIGNIFICANT CHANGE UP (ref 3.8–10.5)

## 2021-12-03 PROCEDURE — 99232 SBSQ HOSP IP/OBS MODERATE 35: CPT

## 2021-12-03 RX ADMIN — Medication 200 MILLIGRAM(S): at 09:44

## 2021-12-03 RX ADMIN — GABAPENTIN 100 MILLIGRAM(S): 400 CAPSULE ORAL at 21:34

## 2021-12-03 RX ADMIN — HEPARIN SODIUM 5000 UNIT(S): 5000 INJECTION INTRAVENOUS; SUBCUTANEOUS at 09:45

## 2021-12-03 RX ADMIN — AMLODIPINE BESYLATE 10 MILLIGRAM(S): 2.5 TABLET ORAL at 09:45

## 2021-12-03 RX ADMIN — OXYCODONE AND ACETAMINOPHEN 2 TABLET(S): 5; 325 TABLET ORAL at 13:28

## 2021-12-03 RX ADMIN — Medication 1 DROP(S): at 09:46

## 2021-12-03 RX ADMIN — PANTOPRAZOLE SODIUM 40 MILLIGRAM(S): 20 TABLET, DELAYED RELEASE ORAL at 06:12

## 2021-12-03 RX ADMIN — OXYCODONE AND ACETAMINOPHEN 2 TABLET(S): 5; 325 TABLET ORAL at 21:34

## 2021-12-03 RX ADMIN — OXYCODONE AND ACETAMINOPHEN 2 TABLET(S): 5; 325 TABLET ORAL at 22:00

## 2021-12-03 RX ADMIN — Medication 1 DROP(S): at 21:35

## 2021-12-03 RX ADMIN — Medication 50 MILLIGRAM(S): at 09:43

## 2021-12-03 RX ADMIN — ATOVAQUONE 1500 MILLIGRAM(S): 750 SUSPENSION ORAL at 09:45

## 2021-12-03 RX ADMIN — Medication 0.1 MILLIGRAM(S): at 09:45

## 2021-12-03 RX ADMIN — OXYCODONE AND ACETAMINOPHEN 2 TABLET(S): 5; 325 TABLET ORAL at 06:12

## 2021-12-03 RX ADMIN — GABAPENTIN 100 MILLIGRAM(S): 400 CAPSULE ORAL at 13:29

## 2021-12-03 RX ADMIN — GABAPENTIN 100 MILLIGRAM(S): 400 CAPSULE ORAL at 06:12

## 2021-12-03 RX ADMIN — Medication 3 MILLIGRAM(S): at 21:38

## 2021-12-03 NOTE — CHART NOTE - NSCHARTNOTEFT_GEN_A_CORE
Dr. Pereira spoke to IR regarding placement of tunneled dialysis catheter on Monday, pending blood cultures over the weekend and clearance by ID. Can tentatively plan for procedure for now, but may be subject to change based on pt discharge plan/IR schedule.   - Keep pt NPO starting midnight prior  - Hold heparin 4 hrs prior  - Repeat Covid PCR within 72 hrs prior

## 2021-12-03 NOTE — PROGRESS NOTE ADULT - SUBJECTIVE AND OBJECTIVE BOX
Date of service: 12-03-21 @ 12:09    Patient lying in bed; afebrile, no complaints    ROS unable to obtain secondary to patient medical condition     MEDICATIONS  (STANDING):  amLODIPine   Tablet 10 milliGRAM(s) Oral daily  artificial  tears Solution 1 Drop(s) Both EYES two times a day  atovaquone  Suspension 1500 milliGRAM(s) Oral daily  chlorhexidine 4% Liquid 1 Application(s) Topical <User Schedule>  cloNIDine 0.1 milliGRAM(s) Oral daily  DAPTOmycin IVPB 350 milliGRAM(s) IV Intermittent every 48 hours  epoetin amee-epbx (RETACRIT) Injectable 56400 Unit(s) IV Push <User Schedule>  gabapentin 100 milliGRAM(s) Oral three times a day  heparin   Injectable 5000 Unit(s) SubCutaneous every 12 hours  heparin   Injectable. 1000 Unit(s) Dialysis. <User Schedule>  hydroxychloroquine 200 milliGRAM(s) Oral daily  influenza   Vaccine 0.5 milliLiter(s) IntraMuscular once  pantoprazole    Tablet 40 milliGRAM(s) Oral before breakfast  predniSONE   Tablet 50 milliGRAM(s) Oral daily    MEDICATIONS  (PRN):  acetaminophen     Tablet .. 650 milliGRAM(s) Oral every 6 hours PRN Temp greater or equal to 38C (100.4F), Mild Pain (1 - 3)  loperamide 2 milliGRAM(s) Oral three times a day PRN Diarrhea  melatonin 3 milliGRAM(s) Oral at bedtime PRN Insomnia  ondansetron Injectable 4 milliGRAM(s) IV Push every 8 hours PRN Nausea and/or Vomiting  oxycodone    5 mG/acetaminophen 325 mG 2 Tablet(s) Oral every 6 hours PRN Severe Pain (7 - 10)  sodium chloride 0.9% lock flush 10 milliLiter(s) IV Push every 1 hour PRN Pre/post blood products, medications, blood draw, and to maintain line patency      Vital Signs Last 24 Hrs  T(C): 36.7 (03 Dec 2021 09:22), Max: 37.1 (02 Dec 2021 13:35)  T(F): 98.1 (03 Dec 2021 09:22), Max: 98.8 (02 Dec 2021 13:35)  HR: 95 (03 Dec 2021 09:22) (88 - 101)  BP: 145/109 (03 Dec 2021 09:22) (135/94 - 170/103)  BP(mean): --  RR: 18 (03 Dec 2021 09:22) (16 - 18)  SpO2: 99% (03 Dec 2021 09:22) (99% - 99%)        Physical Exam:      Constitutional: frail looking  HEENT: NC/AT, EOMI, PERRLA, conjunctivae clear; ears and nose atraumatic; pharynx clear; poor dentition  Neck: supple; thyroid not palpable  Back: no tenderness  Respiratory: respiratory effort normal; clear to auscultation  Cardiovascular: S1S2 regular, no murmurs  Abdomen: soft, not tender, not distended, positive BS; no liver or spleen organomegaly  Genitourinary: no suprapubic tenderness  Musculoskeletal: no muscle tenderness, no joint swelling or tenderness  Neurological/ Psychiatric:   moving all extremities  Skin: no rashes; no palpable lesions; too numerous to count track marks    Labs: all available labs reviewed                          Labs:                        10.4   10.33 )-----------( 247      ( 03 Dec 2021 07:32 )             31.3     12-03    135  |  101  |  46<H>  ----------------------------<  120<H>  3.8   |  21<L>  |  4.07<H>    Ca    7.6<L>      03 Dec 2021 07:32  Phos  4.9     12-03    TPro  x   /  Alb  2.5<L>  /  TBili  x   /  DBili  x   /  AST  x   /  ALT  x   /  AlkPhos  x   12-03           Cultures:       Culture - Blood (collected 12-01-21 @ 08:48)  Source: .Blood None  Preliminary Report (12-02-21 @ 13:01):    No growth to date.    Culture - Blood (collected 12-01-21 @ 08:48)  Source: .Blood None  Preliminary Report (12-02-21 @ 13:01):    No growth to date.            Radiology: all available radiological tests reviewed    Advanced directives addressed: full resuscitation

## 2021-12-03 NOTE — PROGRESS NOTE ADULT - ASSESSMENT
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 Patient is poor historian.     1. Patient admitted for continuation of her treatment for MRSA sepsis; dialysis patient  - follow up cultures   - serial cbc and monitor temperature   - reviewed prior medical records to evaluate for resistant or atypical pathogens   - will start daptomycin 350 mg iv q 48 hours, day #3  - had echocardiogram on the recent admission  - temporary shiley at this time  - if blood cultures remain no growth through weekend should be okay from id standpoint to place tunneled dialysis catheter on 12/6  2. other issues; per medicine

## 2021-12-03 NOTE — PROGRESS NOTE ADULT - SUBJECTIVE AND OBJECTIVE BOX
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 Found to have K of 6.1. She denies any cp/n/v. c/o some SOB and leg edema.     INTERVAL HPI/ OVERNIGHT EVENTS: chart reviewed, Pt was seen and examined in HD unit, tolerated dialysis well, some abd cramps, feels like gonna have diarrhea. Denies bloody BMs, No SOB. No fevers     12/3/21- no acute complaints.     Vital Signs Last 24 Hrs  T(C): 36.7 (03 Dec 2021 09:22), Max: 36.9 (02 Dec 2021 16:15)  T(F): 98.1 (03 Dec 2021 09:22), Max: 98.4 (02 Dec 2021 16:15)  HR: 95 (03 Dec 2021 09:22) (88 - 95)  BP: 145/109 (03 Dec 2021 09:22) (135/94 - 170/103)  BP(mean): --  RR: 18 (03 Dec 2021 09:22) (16 - 18)  SpO2: 99% (03 Dec 2021 09:22) (99% - 99%)    REVIEW OF SYSTEMS:  All other review of systems is negative unless indicated above.      PHYSICAL EXAM:  General: Well developed; looks chronically ill,  in no acute distress  Eyes:  EOMI; conjunctiva and sclera clear  Head: Normocephalic; atraumatic  ENMT: No nasal discharge; airway clear  Neck: Supple; non tender; no masses  Respiratory: No wheezes, rales or rhonchi  Cardiovascular: Regular rate and rhythm. S1 and S2 Normal;  Gastrointestinal: Soft non-tender non-distended; Normal bowel sounds  Genitourinary: No  suprapubic  tenderness  Extremities: + edema  Vascular: Peripheral pulses palpable 2+ bilaterally  Neurological: Alert and oriented x3, non focal   Skin: Warm and dry.   Musculoskeletal: Normal muscle tone  Psychiatric: Cooperative    LABS:                                10.4   10.33 )-----------( 247      ( 03 Dec 2021 07:32 )             31.3     03 Dec 2021 07:32    135    |  101    |  46     ----------------------------<  120    3.8     |  21     |  4.07     Ca    7.6        03 Dec 2021 07:32  Phos  4.9       03 Dec 2021 07:32    TPro  x      /  Alb  2.5    /  TBili  x      /  DBili  x      /  AST  x      /  ALT  x      /  AlkPhos  x      03 Dec 2021 07:32    LIVER FUNCTIONS - ( 03 Dec 2021 07:32 )  Alb: 2.5 g/dL / Pro: x     / ALK PHOS: x     / ALT: x     / AST: x     / GGT: x           MEDICATIONS  (STANDING):  amLODIPine   Tablet 10 milliGRAM(s) Oral daily  artificial  tears Solution 1 Drop(s) Both EYES two times a day  atovaquone  Suspension 1500 milliGRAM(s) Oral daily  chlorhexidine 4% Liquid 1 Application(s) Topical <User Schedule>  cloNIDine 0.1 milliGRAM(s) Oral daily  DAPTOmycin IVPB 350 milliGRAM(s) IV Intermittent every 48 hours  epoetin amee-epbx (RETACRIT) Injectable 26252 Unit(s) IV Push <User Schedule>  gabapentin 100 milliGRAM(s) Oral three times a day  heparin   Injectable 5000 Unit(s) SubCutaneous every 12 hours  heparin   Injectable. 1000 Unit(s) Dialysis. <User Schedule>  hydroxychloroquine 200 milliGRAM(s) Oral daily  influenza   Vaccine 0.5 milliLiter(s) IntraMuscular once  pantoprazole    Tablet 40 milliGRAM(s) Oral before breakfast  predniSONE   Tablet 50 milliGRAM(s) Oral daily    MEDICATIONS  (PRN):  acetaminophen     Tablet .. 650 milliGRAM(s) Oral every 6 hours PRN Temp greater or equal to 38C (100.4F), Mild Pain (1 - 3)  loperamide 2 milliGRAM(s) Oral three times a day PRN Diarrhea  melatonin 3 milliGRAM(s) Oral at bedtime PRN Insomnia  ondansetron Injectable 4 milliGRAM(s) IV Push every 8 hours PRN Nausea and/or Vomiting  oxycodone    5 mG/acetaminophen 325 mG 2 Tablet(s) Oral every 6 hours PRN Severe Pain (7 - 10)  sodium chloride 0.9% lock flush 10 milliLiter(s) IV Push every 1 hour PRN Pre/post blood products, medications, blood draw, and to maintain line patency    RADIOLOGY & ADDITIONAL TESTS:  EXAM:  XR CHEST PORTABLE URGENT 1V                        PROCEDURE DATE:  11/29/2021    INTERPRETATION:  AP chest on November 29, 2021 at 10:55 PM. Patient is scheduled for admission.    Heart magnified by technique.    Lung fields and pleural surfaces are unremarkable.    Advanced left shoulder degeneration again noted.    Jugular line seen on November 15 is been removed.    IMPRESSION: Clear lungs. Other findings as above.        EXAM:  ECHO TTE WO CON COMP W DOPP    PROCEDURE DATE:  11/16/2021     Summary     The mitral valve leaflets appear normal; there is no evidence of stenosis,   fluttering or prolapse.   Mild (1+) mitral regurgitation is present.   Normal aortic valve structure and function.   The tricuspid valve leaflets are thin and pliable; valve motion is normal.   Mild (1+) tricuspid valve regurgitation is present.   Normal appearing pulmonic valve structure.   Trace pulmonic valvular regurgitation is present.   The left atrium appears normal.   Estimated left ventricular ejection fraction is 50-55 %.   Mild concentric left ventricular hypertrophy is present.   Pleural effusion cannot be ruled out.

## 2021-12-03 NOTE — PROGRESS NOTE ADULT - ASSESSMENT
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 SLE   suspected 2/2 IVDA  C/w Prednisone 50mg   C/w Plaquenil   F/u with Rheum outPt     4) MRSA bacteremia/IVDA  Last GRAHAM neg for vegetations   [facility] placed this admission  Most recent Bcx negative  ID eval appreciated, cleared for Tesio  IR to place Tesio, likely early next week    5) HTN: C/w Norvasc and Clonidine     6) DVT PPX: venodynes , heparin s/q    #ACP: FULL CODE.     #Dispo- transfer to med-surg. For Tesio likely early next week

## 2021-12-04 LAB
ALBUMIN SERPL ELPH-MCNC: 2.5 G/DL — LOW (ref 3.3–5)
ANION GAP SERPL CALC-SCNC: 12 MMOL/L — SIGNIFICANT CHANGE UP (ref 5–17)
BASOPHILS # BLD AUTO: 0.01 K/UL — SIGNIFICANT CHANGE UP (ref 0–0.2)
BASOPHILS NFR BLD AUTO: 0.1 % — SIGNIFICANT CHANGE UP (ref 0–2)
BUN SERPL-MCNC: 68 MG/DL — HIGH (ref 7–23)
C DIFF BY PCR RESULT: DETECTED
C DIFF TOX GENS STL QL NAA+PROBE: SIGNIFICANT CHANGE UP
CALCIUM SERPL-MCNC: 7.6 MG/DL — LOW (ref 8.5–10.1)
CHLORIDE SERPL-SCNC: 105 MMOL/L — SIGNIFICANT CHANGE UP (ref 96–108)
CO2 SERPL-SCNC: 21 MMOL/L — LOW (ref 22–31)
CREAT SERPL-MCNC: 5.31 MG/DL — HIGH (ref 0.5–1.3)
EOSINOPHIL # BLD AUTO: 0.06 K/UL — SIGNIFICANT CHANGE UP (ref 0–0.5)
EOSINOPHIL NFR BLD AUTO: 0.5 % — SIGNIFICANT CHANGE UP (ref 0–6)
GLUCOSE SERPL-MCNC: 95 MG/DL — SIGNIFICANT CHANGE UP (ref 70–99)
HCT VFR BLD CALC: 32.9 % — LOW (ref 34.5–45)
HGB BLD-MCNC: 10.3 G/DL — LOW (ref 11.5–15.5)
IMM GRANULOCYTES NFR BLD AUTO: 2.2 % — HIGH (ref 0–1.5)
LYMPHOCYTES # BLD AUTO: 1.92 K/UL — SIGNIFICANT CHANGE UP (ref 1–3.3)
LYMPHOCYTES # BLD AUTO: 15.9 % — SIGNIFICANT CHANGE UP (ref 13–44)
MCHC RBC-ENTMCNC: 29.2 PG — SIGNIFICANT CHANGE UP (ref 27–34)
MCHC RBC-ENTMCNC: 31.3 GM/DL — LOW (ref 32–36)
MCV RBC AUTO: 93.2 FL — SIGNIFICANT CHANGE UP (ref 80–100)
MONOCYTES # BLD AUTO: 1.02 K/UL — HIGH (ref 0–0.9)
MONOCYTES NFR BLD AUTO: 8.4 % — SIGNIFICANT CHANGE UP (ref 2–14)
NEUTROPHILS # BLD AUTO: 8.82 K/UL — HIGH (ref 1.8–7.4)
NEUTROPHILS NFR BLD AUTO: 72.9 % — SIGNIFICANT CHANGE UP (ref 43–77)
PHOSPHATE SERPL-MCNC: 5.1 MG/DL — HIGH (ref 2.5–4.5)
PLATELET # BLD AUTO: 291 K/UL — SIGNIFICANT CHANGE UP (ref 150–400)
POTASSIUM SERPL-MCNC: 3.2 MMOL/L — LOW (ref 3.5–5.3)
POTASSIUM SERPL-SCNC: 3.2 MMOL/L — LOW (ref 3.5–5.3)
RBC # BLD: 3.53 M/UL — LOW (ref 3.8–5.2)
RBC # FLD: 18.6 % — HIGH (ref 10.3–14.5)
SODIUM SERPL-SCNC: 138 MMOL/L — SIGNIFICANT CHANGE UP (ref 135–145)
WBC # BLD: 12.09 K/UL — HIGH (ref 3.8–10.5)
WBC # FLD AUTO: 12.09 K/UL — HIGH (ref 3.8–10.5)

## 2021-12-04 PROCEDURE — 99233 SBSQ HOSP IP/OBS HIGH 50: CPT

## 2021-12-04 RX ORDER — VANCOMYCIN HCL 1 G
125 VIAL (EA) INTRAVENOUS EVERY 6 HOURS
Refills: 0 | Status: DISCONTINUED | OUTPATIENT
Start: 2021-12-04 | End: 2021-12-09

## 2021-12-04 RX ADMIN — Medication 125 MILLIGRAM(S): at 23:00

## 2021-12-04 RX ADMIN — OXYCODONE AND ACETAMINOPHEN 2 TABLET(S): 5; 325 TABLET ORAL at 16:53

## 2021-12-04 RX ADMIN — OXYCODONE AND ACETAMINOPHEN 2 TABLET(S): 5; 325 TABLET ORAL at 11:20

## 2021-12-04 RX ADMIN — AMLODIPINE BESYLATE 10 MILLIGRAM(S): 2.5 TABLET ORAL at 11:10

## 2021-12-04 RX ADMIN — OXYCODONE AND ACETAMINOPHEN 2 TABLET(S): 5; 325 TABLET ORAL at 10:50

## 2021-12-04 RX ADMIN — ATOVAQUONE 1500 MILLIGRAM(S): 750 SUSPENSION ORAL at 12:28

## 2021-12-04 RX ADMIN — DAPTOMYCIN 114 MILLIGRAM(S): 500 INJECTION, POWDER, LYOPHILIZED, FOR SOLUTION INTRAVENOUS at 16:53

## 2021-12-04 RX ADMIN — GABAPENTIN 100 MILLIGRAM(S): 400 CAPSULE ORAL at 21:01

## 2021-12-04 RX ADMIN — GABAPENTIN 100 MILLIGRAM(S): 400 CAPSULE ORAL at 05:02

## 2021-12-04 RX ADMIN — Medication 1 DROP(S): at 21:01

## 2021-12-04 RX ADMIN — HEPARIN SODIUM 5000 UNIT(S): 5000 INJECTION INTRAVENOUS; SUBCUTANEOUS at 21:00

## 2021-12-04 RX ADMIN — Medication 50 MILLIGRAM(S): at 12:29

## 2021-12-04 RX ADMIN — OXYCODONE AND ACETAMINOPHEN 2 TABLET(S): 5; 325 TABLET ORAL at 04:44

## 2021-12-04 RX ADMIN — Medication 125 MILLIGRAM(S): at 17:44

## 2021-12-04 RX ADMIN — ERYTHROPOIETIN 10000 UNIT(S): 10000 INJECTION, SOLUTION INTRAVENOUS; SUBCUTANEOUS at 10:51

## 2021-12-04 RX ADMIN — PANTOPRAZOLE SODIUM 40 MILLIGRAM(S): 20 TABLET, DELAYED RELEASE ORAL at 05:02

## 2021-12-04 RX ADMIN — Medication 0.1 MILLIGRAM(S): at 11:09

## 2021-12-04 RX ADMIN — OXYCODONE AND ACETAMINOPHEN 2 TABLET(S): 5; 325 TABLET ORAL at 22:56

## 2021-12-04 RX ADMIN — GABAPENTIN 100 MILLIGRAM(S): 400 CAPSULE ORAL at 14:18

## 2021-12-04 RX ADMIN — HEPARIN SODIUM 1000 UNIT(S): 5000 INJECTION INTRAVENOUS; SUBCUTANEOUS at 07:50

## 2021-12-04 RX ADMIN — Medication 3 MILLIGRAM(S): at 21:00

## 2021-12-04 RX ADMIN — Medication 200 MILLIGRAM(S): at 12:30

## 2021-12-04 NOTE — PROGRESS NOTE ADULT - SUBJECTIVE AND OBJECTIVE BOX
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 Found to have K of 6.1. She denies any cp/n/v. c/o some SOB and leg edema.     INTERVAL HPI/ OVERNIGHT EVENTS: Pt was seen and examined, s/p HD, tolerated well, still with diarrhea and cramps, CDIff sent       Vital Signs Last 24 Hrs  T(C): 37 (04 Dec 2021 16:06), Max: 37 (04 Dec 2021 16:06)  T(F): 98.6 (04 Dec 2021 16:06), Max: 98.6 (04 Dec 2021 16:06)  HR: 69 (04 Dec 2021 16:06) (69 - 101)  BP: 99/64 (04 Dec 2021 16:06) (99/64 - 170/113)  RR: 18 (04 Dec 2021 16:06) (18 - 18)  SpO2: 98% (04 Dec 2021 16:06) (98% - 100%)    REVIEW OF SYSTEMS:  All other review of systems is negative unless indicated above.      PHYSICAL EXAM:  General: Well developed; looks chronically ill,  in no acute distress  Eyes:  EOMI; conjunctiva and sclera clear  Head: Normocephalic; atraumatic  ENMT: No nasal discharge; airway clear  Neck: Supple; non tender; no masses  Respiratory: No wheezes, rales or rhonchi  Cardiovascular: Regular rate and rhythm. S1 and S2 Normal;  Gastrointestinal: Soft non-tender non-distended; Normal bowel sounds  Genitourinary: No  suprapubic  tenderness  Extremities: + edema  Vascular: Peripheral pulses palpable 2+ bilaterally  Neurological: Alert and oriented x3, non focal   Skin: Warm and dry.   Musculoskeletal: Normal muscle tone  Psychiatric: Cooperative    LABS:                                  10.3   12.09 )-----------( 291      ( 04 Dec 2021 07:40 )             32.9     12-04    138  |  105  |  68<H>  ----------------------------<  95  3.2<L>   |  21<L>  |  5.31<H>    Ca    7.6<L>      04 Dec 2021 07:40  Phos  5.1     12-04    TPro  x   /  Alb  2.5<L>  /  TBili  x   /  DBili  x   /  AST  x   /  ALT  x   /  AlkPhos  x   12-04        LIVER FUNCTIONS - ( 04 Dec 2021 07:40 )  Alb: 2.5 g/dL / Pro: x     / ALK PHOS: x     / ALT: x     / AST: x     / GGT: x                                       10.4   10.33 )-----------( 247      ( 03 Dec 2021 07:32 )             31.3     03 Dec 2021 07:32    135    |  101    |  46     ----------------------------<  120    3.8     |  21     |  4.07     Ca    7.6        03 Dec 2021 07:32  Phos  4.9       03 Dec 2021 07:32    TPro  x      /  Alb  2.5    /  TBili  x      /  DBili  x      /  AST  x      /  ALT  x      /  AlkPhos  x      03 Dec 2021 07:32    LIVER FUNCTIONS - ( 03 Dec 2021 07:32 )  Alb: 2.5 g/dL / Pro: x     / ALK PHOS: x     / ALT: x     / AST: x     / GGT: x           MEDICATIONS  (STANDING):  amLODIPine   Tablet 10 milliGRAM(s) Oral daily  artificial  tears Solution 1 Drop(s) Both EYES two times a day  atovaquone  Suspension 1500 milliGRAM(s) Oral daily  chlorhexidine 4% Liquid 1 Application(s) Topical <User Schedule>  cloNIDine 0.1 milliGRAM(s) Oral daily  DAPTOmycin IVPB 350 milliGRAM(s) IV Intermittent every 48 hours  epoetin amee-epbx (RETACRIT) Injectable 07232 Unit(s) IV Push <User Schedule>  gabapentin 100 milliGRAM(s) Oral three times a day  heparin   Injectable 5000 Unit(s) SubCutaneous every 12 hours  heparin   Injectable. 1000 Unit(s) Dialysis. <User Schedule>  hydroxychloroquine 200 milliGRAM(s) Oral daily  influenza   Vaccine 0.5 milliLiter(s) IntraMuscular once  pantoprazole    Tablet 40 milliGRAM(s) Oral before breakfast  predniSONE   Tablet 50 milliGRAM(s) Oral daily    MEDICATIONS  (PRN):  acetaminophen     Tablet .. 650 milliGRAM(s) Oral every 6 hours PRN Temp greater or equal to 38C (100.4F), Mild Pain (1 - 3)  loperamide 2 milliGRAM(s) Oral three times a day PRN Diarrhea  melatonin 3 milliGRAM(s) Oral at bedtime PRN Insomnia  ondansetron Injectable 4 milliGRAM(s) IV Push every 8 hours PRN Nausea and/or Vomiting  oxycodone    5 mG/acetaminophen 325 mG 2 Tablet(s) Oral every 6 hours PRN Severe Pain (7 - 10)  sodium chloride 0.9% lock flush 10 milliLiter(s) IV Push every 1 hour PRN Pre/post blood products, medications, blood draw, and to maintain line patency    RADIOLOGY & ADDITIONAL TESTS:  EXAM:  XR CHEST PORTABLE URGENT 1V                        PROCEDURE DATE:  11/29/2021    INTERPRETATION:  AP chest on November 29, 2021 at 10:55 PM. Patient is scheduled for admission.    Heart magnified by technique.    Lung fields and pleural surfaces are unremarkable.    Advanced left shoulder degeneration again noted.    Jugular line seen on November 15 is been removed.    IMPRESSION: Clear lungs. Other findings as above.        EXAM:  ECHO TTE WO CON COMP W DOPP    PROCEDURE DATE:  11/16/2021     Summary     The mitral valve leaflets appear normal; there is no evidence of stenosis,   fluttering or prolapse.   Mild (1+) mitral regurgitation is present.   Normal aortic valve structure and function.   The tricuspid valve leaflets are thin and pliable; valve motion is normal.   Mild (1+) tricuspid valve regurgitation is present.   Normal appearing pulmonic valve structure.   Trace pulmonic valvular regurgitation is present.   The left atrium appears normal.   Estimated left ventricular ejection fraction is 50-55 %.   Mild concentric left ventricular hypertrophy is present.   Pleural effusion cannot be ruled out.

## 2021-12-04 NOTE — PROGRESS NOTE ADULT - ASSESSMENT
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

## 2021-12-04 NOTE — PROGRESS NOTE ADULT - ASSESSMENT
41/ F with PMHx of  bacteremia,  kidney bx proven ANCA vasculitis presumed sec to cocaine/ heroin abuse,  COPD, depression, epilepsy, GERD, Raynaud's syndrome, heroin abuse, HTN, RA, sepsis, SLE, smoker, ESRD admitted for:     1) Hyperkalemia 6.1, resolved /Hypokalemia   sp lokelma, insulin cocktail and HD  K levels  low this am, to be corrected with HD   LAbs in am     2) ESRD on HD   S/p temporary  HD catheter placement by IR   HD as per renal,had Tx  today       3) P-ANCA associated vasculitis. SLE   suspected 2/2 IVDA  C/w Prednisone 50mg   C/w Plaquenil   F/u with Rheum outPt     4) MRSA bacteremia/IVDA  Last GRAHAM neg for vegetations   [facility] placed this admission  Most recent Bcx negative  ID eval appreciated, cleared for Tesio  IR to place Tesio, likely early next week, NPO after midnight on Sunday     5) CDIFF Diarrhea   isolate  CDIFF PCR +   Start Vanco Po  Will d/w ID    5) HTN: C/w Norvasc and Clonidine     6) DVT PPX: venodynes , heparin s/q    #ACP: FULL CODE.     #Dispo- transfer to med-surg. For Tesio likely early next week

## 2021-12-04 NOTE — PROGRESS NOTE ADULT - SUBJECTIVE AND OBJECTIVE BOX
NEPHROLOGY INTERVAL HPI/OVERNIGHT EVENTS:  21 @ 11:51  covering raeann/candida  pt seen on hd earlier  no c/o   + uop   no sob      HPI:  41/ F with PMHx of  bacteremia,  kidney bx proven ANCA vasculitis presumed sec to cocaine/ heroin abuse ( Cocaine + on drug tox) COPD, depression, epilepsy, GERD, Raynaud's syndrome, heroin abuse, HTN, lupus, RA, sepsis, SLE, smoker, ESRD initially initiated on HD with tunneled HD catheter placed 10/22/21, recently admitted and went AMA on , came back to Ed for HD. Found to have K of 6.1. She denies any cp/n/v. c/o some SOB and leg edema.       MEDICATIONS  (STANDING):  amLODIPine   Tablet 10 milliGRAM(s) Oral daily  artificial  tears Solution 1 Drop(s) Both EYES two times a day  atovaquone  Suspension 1500 milliGRAM(s) Oral daily  chlorhexidine 4% Liquid 1 Application(s) Topical <User Schedule>  cloNIDine 0.1 milliGRAM(s) Oral daily  DAPTOmycin IVPB 350 milliGRAM(s) IV Intermittent every 48 hours  epoetin amee-epbx (RETACRIT) Injectable 75708 Unit(s) IV Push <User Schedule>  gabapentin 100 milliGRAM(s) Oral three times a day  heparin   Injectable 5000 Unit(s) SubCutaneous every 12 hours  heparin   Injectable. 1000 Unit(s) Dialysis. <User Schedule>  hydroxychloroquine 200 milliGRAM(s) Oral daily  influenza   Vaccine 0.5 milliLiter(s) IntraMuscular once  pantoprazole    Tablet 40 milliGRAM(s) Oral before breakfast  predniSONE   Tablet 50 milliGRAM(s) Oral daily    MEDICATIONS  (PRN):  acetaminophen     Tablet .. 650 milliGRAM(s) Oral every 6 hours PRN Temp greater or equal to 38C (100.4F), Mild Pain (1 - 3)  loperamide 2 milliGRAM(s) Oral three times a day PRN Diarrhea  melatonin 3 milliGRAM(s) Oral at bedtime PRN Insomnia  ondansetron Injectable 4 milliGRAM(s) IV Push every 8 hours PRN Nausea and/or Vomiting  oxycodone    5 mG/acetaminophen 325 mG 2 Tablet(s) Oral every 6 hours PRN Severe Pain (7 - 10)  sodium chloride 0.9% lock flush 10 milliLiter(s) IV Push every 1 hour PRN Pre/post blood products, medications, blood draw, and to maintain line patency      Allergies    morphine (Rash)  morphine (Unknown)    Intolerances        I&O's Detail      .    Patient was seen and evaluated on dialysis.   Patient is tolerating the procedure well.   Continue dialysis:   Dialyzer: revacelar 300 on 3k with uf goal of 2.5 kg via temp shiley    Vital Signs Last 24 Hrs  T(C): 36.4 (04 Dec 2021 07:27), Max: 36.9 (03 Dec 2021 21:30)  T(F): 97.6 (04 Dec 2021 07:27), Max: 98.4 (03 Dec 2021 21:30)  HR: 9 (04 Dec 2021 11:14) (9 - 101)  BP: 163/111 (04 Dec 2021 11:14) (147/98 - 166/103)  BP(mean): --  RR: 18 (04 Dec 2021 11:14) (18 - 18)  SpO2: 100% (04 Dec 2021 04:42) (100% - 100%)  Daily     Daily Weight in k.4 (04 Dec 2021 05:51)    PHYSICAL EXAM:  General: alert. awake NAD  HEENT: MMM  CV: s1s2 rrr  LUNGS: B/L CTA  EXT: no edema    LABS:                        10.3   12.09 )-----------( 291      ( 04 Dec 2021 07:40 )             32.9     12-    138  |  105  |  68<H>  ----------------------------<  95  3.2<L>   |  21<L>  |  5.31<H>    Ca    7.6<L>      04 Dec 2021 07:40  Phos  5.1     12-    TPro  x   /  Alb  2.5<L>  /  TBili  x   /  DBili  x   /  AST  x   /  ALT  x   /  AlkPhos  x   12-        Phosphorus Level, Serum: 5.1 mg/dL ( @ 07:40)

## 2021-12-05 LAB
ANION GAP SERPL CALC-SCNC: 9 MMOL/L — SIGNIFICANT CHANGE UP (ref 5–17)
BUN SERPL-MCNC: 49 MG/DL — HIGH (ref 7–23)
CALCIUM SERPL-MCNC: 7.6 MG/DL — LOW (ref 8.5–10.1)
CHLORIDE SERPL-SCNC: 102 MMOL/L — SIGNIFICANT CHANGE UP (ref 96–108)
CO2 SERPL-SCNC: 24 MMOL/L — SIGNIFICANT CHANGE UP (ref 22–31)
CREAT SERPL-MCNC: 4.1 MG/DL — HIGH (ref 0.5–1.3)
CULTURE RESULTS: SIGNIFICANT CHANGE UP
GLUCOSE SERPL-MCNC: 85 MG/DL — SIGNIFICANT CHANGE UP (ref 70–99)
HCT VFR BLD CALC: 33.4 % — LOW (ref 34.5–45)
HGB BLD-MCNC: 10.5 G/DL — LOW (ref 11.5–15.5)
MAGNESIUM SERPL-MCNC: 1.7 MG/DL — SIGNIFICANT CHANGE UP (ref 1.6–2.6)
MCHC RBC-ENTMCNC: 29.2 PG — SIGNIFICANT CHANGE UP (ref 27–34)
MCHC RBC-ENTMCNC: 31.4 GM/DL — LOW (ref 32–36)
MCV RBC AUTO: 92.8 FL — SIGNIFICANT CHANGE UP (ref 80–100)
PLATELET # BLD AUTO: 273 K/UL — SIGNIFICANT CHANGE UP (ref 150–400)
POTASSIUM SERPL-MCNC: 3.9 MMOL/L — SIGNIFICANT CHANGE UP (ref 3.5–5.3)
POTASSIUM SERPL-SCNC: 3.9 MMOL/L — SIGNIFICANT CHANGE UP (ref 3.5–5.3)
RBC # BLD: 3.6 M/UL — LOW (ref 3.8–5.2)
RBC # FLD: 18.2 % — HIGH (ref 10.3–14.5)
SARS-COV-2 RNA SPEC QL NAA+PROBE: SIGNIFICANT CHANGE UP
SODIUM SERPL-SCNC: 135 MMOL/L — SIGNIFICANT CHANGE UP (ref 135–145)
SPECIMEN SOURCE: SIGNIFICANT CHANGE UP
WBC # BLD: 11.21 K/UL — HIGH (ref 3.8–10.5)
WBC # FLD AUTO: 11.21 K/UL — HIGH (ref 3.8–10.5)

## 2021-12-05 PROCEDURE — 99232 SBSQ HOSP IP/OBS MODERATE 35: CPT

## 2021-12-05 RX ORDER — OXYCODONE HYDROCHLORIDE 5 MG/1
10 TABLET ORAL EVERY 4 HOURS
Refills: 0 | Status: DISCONTINUED | OUTPATIENT
Start: 2021-12-05 | End: 2021-12-09

## 2021-12-05 RX ORDER — OXYCODONE HYDROCHLORIDE 5 MG/1
5 TABLET ORAL EVERY 4 HOURS
Refills: 0 | Status: DISCONTINUED | OUTPATIENT
Start: 2021-12-05 | End: 2021-12-09

## 2021-12-05 RX ADMIN — Medication 125 MILLIGRAM(S): at 05:06

## 2021-12-05 RX ADMIN — HEPARIN SODIUM 5000 UNIT(S): 5000 INJECTION INTRAVENOUS; SUBCUTANEOUS at 22:38

## 2021-12-05 RX ADMIN — Medication 0.1 MILLIGRAM(S): at 10:16

## 2021-12-05 RX ADMIN — PANTOPRAZOLE SODIUM 40 MILLIGRAM(S): 20 TABLET, DELAYED RELEASE ORAL at 05:06

## 2021-12-05 RX ADMIN — GABAPENTIN 100 MILLIGRAM(S): 400 CAPSULE ORAL at 14:46

## 2021-12-05 RX ADMIN — OXYCODONE HYDROCHLORIDE 10 MILLIGRAM(S): 5 TABLET ORAL at 22:38

## 2021-12-05 RX ADMIN — OXYCODONE HYDROCHLORIDE 10 MILLIGRAM(S): 5 TABLET ORAL at 15:46

## 2021-12-05 RX ADMIN — Medication 125 MILLIGRAM(S): at 18:15

## 2021-12-05 RX ADMIN — GABAPENTIN 100 MILLIGRAM(S): 400 CAPSULE ORAL at 05:06

## 2021-12-05 RX ADMIN — OXYCODONE AND ACETAMINOPHEN 2 TABLET(S): 5; 325 TABLET ORAL at 05:06

## 2021-12-05 RX ADMIN — OXYCODONE HYDROCHLORIDE 10 MILLIGRAM(S): 5 TABLET ORAL at 18:15

## 2021-12-05 RX ADMIN — OXYCODONE HYDROCHLORIDE 10 MILLIGRAM(S): 5 TABLET ORAL at 14:46

## 2021-12-05 RX ADMIN — Medication 1 DROP(S): at 10:15

## 2021-12-05 RX ADMIN — ATOVAQUONE 1500 MILLIGRAM(S): 750 SUSPENSION ORAL at 10:17

## 2021-12-05 RX ADMIN — Medication 125 MILLIGRAM(S): at 12:10

## 2021-12-05 RX ADMIN — Medication 200 MILLIGRAM(S): at 10:16

## 2021-12-05 RX ADMIN — OXYCODONE AND ACETAMINOPHEN 2 TABLET(S): 5; 325 TABLET ORAL at 10:16

## 2021-12-05 RX ADMIN — Medication 1 DROP(S): at 22:39

## 2021-12-05 RX ADMIN — AMLODIPINE BESYLATE 10 MILLIGRAM(S): 2.5 TABLET ORAL at 10:17

## 2021-12-05 RX ADMIN — Medication 50 MILLIGRAM(S): at 10:17

## 2021-12-05 RX ADMIN — GABAPENTIN 100 MILLIGRAM(S): 400 CAPSULE ORAL at 22:38

## 2021-12-05 RX ADMIN — Medication 3 MILLIGRAM(S): at 22:38

## 2021-12-05 RX ADMIN — HEPARIN SODIUM 5000 UNIT(S): 5000 INJECTION INTRAVENOUS; SUBCUTANEOUS at 10:15

## 2021-12-05 NOTE — PROGRESS NOTE ADULT - SUBJECTIVE AND OBJECTIVE BOX
NEPHROLOGY INTERVAL HPI/OVERNIGHT EVENTS:  covering chay  12-05-21 @ 15:31  cdiff pos   12-04-21 @ 11:51  pt seen on hd earlier  no c/o   + uop   no sob      MEDICATIONS  (STANDING):  amLODIPine   Tablet 10 milliGRAM(s) Oral daily  artificial  tears Solution 1 Drop(s) Both EYES two times a day  atovaquone  Suspension 1500 milliGRAM(s) Oral daily  chlorhexidine 4% Liquid 1 Application(s) Topical <User Schedule>  cloNIDine 0.1 milliGRAM(s) Oral daily  DAPTOmycin IVPB 350 milliGRAM(s) IV Intermittent every 48 hours  epoetin amee-epbx (RETACRIT) Injectable 25373 Unit(s) IV Push <User Schedule>  gabapentin 100 milliGRAM(s) Oral three times a day  heparin   Injectable 5000 Unit(s) SubCutaneous every 12 hours  heparin   Injectable. 1000 Unit(s) Dialysis. <User Schedule>  hydroxychloroquine 200 milliGRAM(s) Oral daily  influenza   Vaccine 0.5 milliLiter(s) IntraMuscular once  pantoprazole    Tablet 40 milliGRAM(s) Oral before breakfast  predniSONE   Tablet 50 milliGRAM(s) Oral daily  vancomycin    Solution 125 milliGRAM(s) Oral every 6 hours    MEDICATIONS  (PRN):  acetaminophen     Tablet .. 650 milliGRAM(s) Oral every 6 hours PRN Temp greater or equal to 38C (100.4F), Mild Pain (1 - 3)  melatonin 3 milliGRAM(s) Oral at bedtime PRN Insomnia  ondansetron Injectable 4 milliGRAM(s) IV Push every 8 hours PRN Nausea and/or Vomiting  oxyCODONE    IR 10 milliGRAM(s) Oral every 4 hours PRN Severe Pain (7 - 10)  oxyCODONE    IR 5 milliGRAM(s) Oral every 4 hours PRN Moderate Pain (4 - 6)  sodium chloride 0.9% lock flush 10 milliLiter(s) IV Push every 1 hour PRN Pre/post blood products, medications, blood draw, and to maintain line patency      Allergies    morphine (Rash)  morphine (Unknown)    Intolerances        I&O's Detail    04 Dec 2021 07:01  -  05 Dec 2021 07:00  --------------------------------------------------------  IN:    Other (mL): 500 mL  Total IN: 500 mL    OUT:    Other (mL): 2200 mL  Total OUT: 2200 mL    Total NET: -1700 mL            Vital Signs Last 24 Hrs  T(C): 36.6 (05 Dec 2021 08:40), Max: 37 (04 Dec 2021 16:06)  T(F): 97.9 (05 Dec 2021 08:40), Max: 98.6 (04 Dec 2021 16:06)  HR: 76 (05 Dec 2021 08:40) (69 - 76)  BP: 144/102 (05 Dec 2021 08:40) (99/64 - 144/102)  BP(mean): --  RR: 17 (05 Dec 2021 08:40) (17 - 18)  SpO2: 98% (05 Dec 2021 08:40) (98% - 99%)  Daily     Daily     PHYSICAL EXAM:  General: alert. awake Ox3  HEENT: MMM  CV: s1s2 rrr  LUNGS: B/L CTA  EXT: no edema    LABS:                        10.5   11.21 )-----------( 273      ( 05 Dec 2021 07:57 )             33.4     12-05    135  |  102  |  49<H>  ----------------------------<  85  3.9   |  24  |  4.10<H>    Ca    7.6<L>      05 Dec 2021 07:57  Phos  5.1     12-04  Mg     1.7     12-05    TPro  x   /  Alb  2.5<L>  /  TBili  x   /  DBili  x   /  AST  x   /  ALT  x   /  AlkPhos  x   12-04        Magnesium, Serum: 1.7 mg/dL (12-05 @ 07:57)

## 2021-12-05 NOTE — PROGRESS NOTE ADULT - ASSESSMENT
41/ F with PMHx of  bacteremia,  kidney bx proven ANCA vasculitis presumed sec to cocaine/ heroin abuse ( Cocaine + on drug tox) COPD, depression, epilepsy, GERD, Raynaud's syndrome, heroin abuse, HTN, lupus, RA, sepsis, SLE, smoker  with esrd on hd tts  anemia   htn     PLAN   - esrd tolerating hd, uf goal of 2.5 kg     k adjusted to correct the 3.2 k   - anemia on epo fu trend of h/h  - htn to dose meds now, fu trend   - anca vacualitis on prednisone as per rheum   - mrsa bacteremia     bld cs from 12/1 neg    for tdc monday if with persistent bld cx    tentative clearance by ID     NPO after MN on sunday   dw hd rn    12/5 mK   -esrd with need for tdc     will keep npo after MN  in anticipation of tdc in am while await ID clearance    pt now cdiff pos.      dr cary/candida will resume care monday am

## 2021-12-05 NOTE — PROGRESS NOTE ADULT - SUBJECTIVE AND OBJECTIVE BOX
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 Found to have K of 6.1. She denies any cp/n/v. c/o some SOB and leg edema.     INTERVAL HPI/ OVERNIGHT EVENTS: Pt was seen and examined, still with diarrhea, abd cramps. OK PO intake, but no appetite. No fevers. Asking to increase pain meds     Vital Signs Last 24 Hrs  T(C): 36.6 (05 Dec 2021 15:44), Max: 36.6 (05 Dec 2021 08:40)  T(F): 97.9 (05 Dec 2021 15:44), Max: 97.9 (05 Dec 2021 08:40)  HR: 89 (05 Dec 2021 15:44) (73 - 89)  BP: 132/96 (05 Dec 2021 15:44) (127/91 - 144/102)  RR: 17 (05 Dec 2021 15:44) (17 - 18)  SpO2: 100% (05 Dec 2021 15:44) (98% - 100%)    REVIEW OF SYSTEMS:  All other review of systems is negative unless indicated above.      PHYSICAL EXAM:  General: Well developed; looks chronically ill,  in no acute distress, but uncomfortable  Eyes:  EOMI; conjunctiva and sclera clear  Head: Normocephalic; atraumatic  ENMT: No nasal discharge; airway clear  Neck: Supple; non tender; no masses  Respiratory: No wheezes, rales or rhonchi  Cardiovascular: Regular rate and rhythm. S1 and S2 Normal;  Gastrointestinal: Soft non-tender non-distended; Normal bowel sounds  Genitourinary: No  suprapubic  tenderness  Extremities: + edema  Vascular: Peripheral pulses palpable 2+ bilaterally  Neurological: Alert and oriented x3, non focal   Skin: Warm and dry.   Musculoskeletal: Normal muscle tone  Psychiatric: Cooperative    LABS:                           10.5   11.21 )-----------( 273      ( 05 Dec 2021 07:57 )             33.4     12-05    135  |  102  |  49<H>  ----------------------------<  85  3.9   |  24  |  4.10<H>    Ca    7.6<L>      05 Dec 2021 07:57  Phos  5.1     12-04  Mg     1.7     12-05    TPro  x   /  Alb  2.5<L>  /  TBili  x   /  DBili  x   /  AST  x   /  ALT  x   /  AlkPhos  x   12-04        LIVER FUNCTIONS - ( 04 Dec 2021 07:40 )  Alb: 2.5 g/dL / Pro: x     / ALK PHOS: x     / ALT: x     / AST: x     / GGT: x                                          10.3   12.09 )-----------( 291      ( 04 Dec 2021 07:40 )             32.9     12-04    138  |  105  |  68<H>  ----------------------------<  95  3.2<L>   |  21<L>  |  5.31<H>    Ca    7.6<L>      04 Dec 2021 07:40  Phos  5.1     12-04    TPro  x   /  Alb  2.5<L>  /  TBili  x   /  DBili  x   /  AST  x   /  ALT  x   /  AlkPhos  x   12-04        LIVER FUNCTIONS - ( 04 Dec 2021 07:40 )  Alb: 2.5 g/dL / Pro: x     / ALK PHOS: x     / ALT: x     / AST: x     / GGT: x                                       10.4   10.33 )-----------( 247      ( 03 Dec 2021 07:32 )             31.3     03 Dec 2021 07:32    135    |  101    |  46     ----------------------------<  120    3.8     |  21     |  4.07     Ca    7.6        03 Dec 2021 07:32  Phos  4.9       03 Dec 2021 07:32    TPro  x      /  Alb  2.5    /  TBili  x      /  DBili  x      /  AST  x      /  ALT  x      /  AlkPhos  x      03 Dec 2021 07:32    LIVER FUNCTIONS - ( 03 Dec 2021 07:32 )  Alb: 2.5 g/dL / Pro: x     / ALK PHOS: x     / ALT: x     / AST: x     / GGT: x           MEDICATIONS  (STANDING):  amLODIPine   Tablet 10 milliGRAM(s) Oral daily  artificial  tears Solution 1 Drop(s) Both EYES two times a day  atovaquone  Suspension 1500 milliGRAM(s) Oral daily  chlorhexidine 4% Liquid 1 Application(s) Topical <User Schedule>  cloNIDine 0.1 milliGRAM(s) Oral daily  DAPTOmycin IVPB 350 milliGRAM(s) IV Intermittent every 48 hours  epoetin amee-epbx (RETACRIT) Injectable 96505 Unit(s) IV Push <User Schedule>  gabapentin 100 milliGRAM(s) Oral three times a day  heparin   Injectable 5000 Unit(s) SubCutaneous every 12 hours  heparin   Injectable. 1000 Unit(s) Dialysis. <User Schedule>  hydroxychloroquine 200 milliGRAM(s) Oral daily  influenza   Vaccine 0.5 milliLiter(s) IntraMuscular once  pantoprazole    Tablet 40 milliGRAM(s) Oral before breakfast  predniSONE   Tablet 50 milliGRAM(s) Oral daily  vancomycin    Solution 125 milliGRAM(s) Oral every 6 hours    MEDICATIONS  (PRN):  acetaminophen     Tablet .. 650 milliGRAM(s) Oral every 6 hours PRN Temp greater or equal to 38C (100.4F), Mild Pain (1 - 3)  melatonin 3 milliGRAM(s) Oral at bedtime PRN Insomnia  ondansetron Injectable 4 milliGRAM(s) IV Push every 8 hours PRN Nausea and/or Vomiting  oxyCODONE    IR 10 milliGRAM(s) Oral every 4 hours PRN Severe Pain (7 - 10)  oxyCODONE    IR 5 milliGRAM(s) Oral every 4 hours PRN Moderate Pain (4 - 6)  sodium chloride 0.9% lock flush 10 milliLiter(s) IV Push every 1 hour PRN Pre/post blood products, medications, blood draw, and to maintain line patency    RADIOLOGY & ADDITIONAL TESTS:  EXAM:  XR CHEST PORTABLE URGENT 1V                        PROCEDURE DATE:  11/29/2021    INTERPRETATION:  AP chest on November 29, 2021 at 10:55 PM. Patient is scheduled for admission.    Heart magnified by technique.    Lung fields and pleural surfaces are unremarkable.    Advanced left shoulder degeneration again noted.    Jugular line seen on November 15 is been removed.    IMPRESSION: Clear lungs. Other findings as above.        EXAM:  ECHO TTE WO CON COMP W DOPP    PROCEDURE DATE:  11/16/2021     Summary     The mitral valve leaflets appear normal; there is no evidence of stenosis,   fluttering or prolapse.   Mild (1+) mitral regurgitation is present.   Normal aortic valve structure and function.   The tricuspid valve leaflets are thin and pliable; valve motion is normal.   Mild (1+) tricuspid valve regurgitation is present.   Normal appearing pulmonic valve structure.   Trace pulmonic valvular regurgitation is present.   The left atrium appears normal.   Estimated left ventricular ejection fraction is 50-55 %.   Mild concentric left ventricular hypertrophy is present.   Pleural effusion cannot be ruled out.

## 2021-12-05 NOTE — PROGRESS NOTE ADULT - ASSESSMENT
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 SLE   suspected 2/2 IVDA  C/w Prednisone 50mg   C/w Plaquenil   F/u with Rheum outPt     4) MRSA bacteremia/IVDA  Last GRAHAM neg for vegetations   [facility] placed this admission  Most recent Bcx negative  ID eval appreciated, cleared for Tesio from MRSA stand point   IR to place Tesio, likely early next week, NPO after midnight on Sunday     5) CDIFF Diarrhea   isolate  CDIFF PCR +   Started on  Vanco Po  Will d/w ID    5) HTN: C/w Norvasc and Clonidine     6) Chronic pain.   On gabapentin and percocet, will change to Oxy ( not to mask fever)   Adust pain meds as needed   7)  DVT PPX: venodynes , heparin s/q    #ACP: FULL CODE.     #Dispo: - transfer to med-surg. For Tesio  possible in am

## 2021-12-06 LAB
ANION GAP SERPL CALC-SCNC: 13 MMOL/L — SIGNIFICANT CHANGE UP (ref 5–17)
BUN SERPL-MCNC: 79 MG/DL — HIGH (ref 7–23)
CALCIUM SERPL-MCNC: 7.7 MG/DL — LOW (ref 8.5–10.1)
CHLORIDE SERPL-SCNC: 102 MMOL/L — SIGNIFICANT CHANGE UP (ref 96–108)
CO2 SERPL-SCNC: 19 MMOL/L — LOW (ref 22–31)
CREAT SERPL-MCNC: 5.28 MG/DL — HIGH (ref 0.5–1.3)
CULTURE RESULTS: SIGNIFICANT CHANGE UP
CULTURE RESULTS: SIGNIFICANT CHANGE UP
GLUCOSE SERPL-MCNC: 78 MG/DL — SIGNIFICANT CHANGE UP (ref 70–99)
HCT VFR BLD CALC: 34.1 % — LOW (ref 34.5–45)
HGB BLD-MCNC: 11 G/DL — LOW (ref 11.5–15.5)
MAGNESIUM SERPL-MCNC: 1.5 MG/DL — LOW (ref 1.6–2.6)
MCHC RBC-ENTMCNC: 29.6 PG — SIGNIFICANT CHANGE UP (ref 27–34)
MCHC RBC-ENTMCNC: 32.3 GM/DL — SIGNIFICANT CHANGE UP (ref 32–36)
MCV RBC AUTO: 91.9 FL — SIGNIFICANT CHANGE UP (ref 80–100)
PHOSPHATE SERPL-MCNC: 4.9 MG/DL — HIGH (ref 2.5–4.5)
PLATELET # BLD AUTO: 353 K/UL — SIGNIFICANT CHANGE UP (ref 150–400)
POTASSIUM SERPL-MCNC: 3.5 MMOL/L — SIGNIFICANT CHANGE UP (ref 3.5–5.3)
POTASSIUM SERPL-SCNC: 3.5 MMOL/L — SIGNIFICANT CHANGE UP (ref 3.5–5.3)
RBC # BLD: 3.71 M/UL — LOW (ref 3.8–5.2)
RBC # FLD: 18.4 % — HIGH (ref 10.3–14.5)
SODIUM SERPL-SCNC: 134 MMOL/L — LOW (ref 135–145)
SPECIMEN SOURCE: SIGNIFICANT CHANGE UP
SPECIMEN SOURCE: SIGNIFICANT CHANGE UP
WBC # BLD: 15.91 K/UL — HIGH (ref 3.8–10.5)
WBC # FLD AUTO: 15.91 K/UL — HIGH (ref 3.8–10.5)

## 2021-12-06 PROCEDURE — 99233 SBSQ HOSP IP/OBS HIGH 50: CPT

## 2021-12-06 RX ORDER — MAGNESIUM OXIDE 400 MG ORAL TABLET 241.3 MG
400 TABLET ORAL EVERY 4 HOURS
Refills: 0 | Status: COMPLETED | OUTPATIENT
Start: 2021-12-06 | End: 2021-12-07

## 2021-12-06 RX ORDER — SIMETHICONE 80 MG/1
80 TABLET, CHEWABLE ORAL THREE TIMES A DAY
Refills: 0 | Status: DISCONTINUED | OUTPATIENT
Start: 2021-12-06 | End: 2021-12-09

## 2021-12-06 RX ADMIN — OXYCODONE HYDROCHLORIDE 10 MILLIGRAM(S): 5 TABLET ORAL at 21:42

## 2021-12-06 RX ADMIN — AMLODIPINE BESYLATE 10 MILLIGRAM(S): 2.5 TABLET ORAL at 09:15

## 2021-12-06 RX ADMIN — OXYCODONE HYDROCHLORIDE 10 MILLIGRAM(S): 5 TABLET ORAL at 09:24

## 2021-12-06 RX ADMIN — GABAPENTIN 100 MILLIGRAM(S): 400 CAPSULE ORAL at 06:53

## 2021-12-06 RX ADMIN — OXYCODONE HYDROCHLORIDE 10 MILLIGRAM(S): 5 TABLET ORAL at 12:32

## 2021-12-06 RX ADMIN — Medication 125 MILLIGRAM(S): at 06:53

## 2021-12-06 RX ADMIN — OXYCODONE HYDROCHLORIDE 10 MILLIGRAM(S): 5 TABLET ORAL at 08:24

## 2021-12-06 RX ADMIN — Medication 1 DROP(S): at 21:19

## 2021-12-06 RX ADMIN — OXYCODONE HYDROCHLORIDE 10 MILLIGRAM(S): 5 TABLET ORAL at 16:53

## 2021-12-06 RX ADMIN — OXYCODONE HYDROCHLORIDE 10 MILLIGRAM(S): 5 TABLET ORAL at 02:54

## 2021-12-06 RX ADMIN — MAGNESIUM OXIDE 400 MG ORAL TABLET 400 MILLIGRAM(S): 241.3 TABLET ORAL at 20:26

## 2021-12-06 RX ADMIN — Medication 125 MILLIGRAM(S): at 12:32

## 2021-12-06 RX ADMIN — Medication 125 MILLIGRAM(S): at 00:03

## 2021-12-06 RX ADMIN — GABAPENTIN 100 MILLIGRAM(S): 400 CAPSULE ORAL at 14:18

## 2021-12-06 RX ADMIN — Medication 1 DROP(S): at 09:17

## 2021-12-06 RX ADMIN — Medication 0.1 MILLIGRAM(S): at 09:15

## 2021-12-06 RX ADMIN — HEPARIN SODIUM 5000 UNIT(S): 5000 INJECTION INTRAVENOUS; SUBCUTANEOUS at 21:14

## 2021-12-06 RX ADMIN — OXYCODONE HYDROCHLORIDE 10 MILLIGRAM(S): 5 TABLET ORAL at 17:53

## 2021-12-06 RX ADMIN — PANTOPRAZOLE SODIUM 40 MILLIGRAM(S): 20 TABLET, DELAYED RELEASE ORAL at 06:53

## 2021-12-06 RX ADMIN — ATOVAQUONE 1500 MILLIGRAM(S): 750 SUSPENSION ORAL at 09:15

## 2021-12-06 RX ADMIN — Medication 200 MILLIGRAM(S): at 09:15

## 2021-12-06 RX ADMIN — Medication 50 MILLIGRAM(S): at 09:15

## 2021-12-06 RX ADMIN — OXYCODONE HYDROCHLORIDE 10 MILLIGRAM(S): 5 TABLET ORAL at 13:32

## 2021-12-06 RX ADMIN — Medication 125 MILLIGRAM(S): at 18:02

## 2021-12-06 RX ADMIN — GABAPENTIN 100 MILLIGRAM(S): 400 CAPSULE ORAL at 21:14

## 2021-12-06 RX ADMIN — OXYCODONE HYDROCHLORIDE 10 MILLIGRAM(S): 5 TABLET ORAL at 21:12

## 2021-12-06 RX ADMIN — Medication 3 MILLIGRAM(S): at 21:18

## 2021-12-06 NOTE — PROGRESS NOTE ADULT - SUBJECTIVE AND OBJECTIVE BOX
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 Found to have K of 6.1. She denies any cp/n/v. c/o some SOB and leg edema.     INTERVAL HPI/ OVERNIGHT EVENTS: Pt was seen and examined, reports no loose stool since yesterday but still has cramps and feels gassy. NO fevers.  POC discussed     Vital Signs Last 24 Hrs  T(C): 36.7 (06 Dec 2021 17:00), Max: 36.7 (05 Dec 2021 21:21)  T(F): 98.1 (06 Dec 2021 17:00), Max: 98.1 (06 Dec 2021 08:35)  HR: 78 (06 Dec 2021 17:00) (73 - 90)  BP: 137/91 (06 Dec 2021 17:00) (137/91 - 139/96)  RR: 18 (06 Dec 2021 17:00) (17 - 18)  SpO2: 100% (06 Dec 2021 17:00) (100% - 100%)  REVIEW OF SYSTEMS:  All other review of systems is negative unless indicated above.      PHYSICAL EXAM:  General: Well developed; looks chronically ill,  in no acute distress, but uncomfortable  Eyes:  EOMI; conjunctiva and sclera clear  Head: Normocephalic; atraumatic  ENMT: No nasal discharge; airway clear  Neck: Supple; non tender; no masses  Respiratory: No wheezes, rales or rhonchi  Cardiovascular: Regular rate and rhythm. S1 and S2 Normal;  Gastrointestinal: Soft, non-tender non-distended; Normal bowel sounds  Genitourinary: No  suprapubic  tenderness  Extremities: + edema  Vascular: Peripheral pulses palpable 2+ bilaterally  Neurological: Alert and oriented x3, non focal   Skin: Warm and dry.   Musculoskeletal: Normal muscle tone  Psychiatric: Cooperative    LABS:                           11.0   15.91 )-----------( 353      ( 06 Dec 2021 08:49 )             34.1     12-06    134<L>  |  102  |  79<H>  ----------------------------<  78  3.5   |  19<L>  |  5.28<H>    Ca    7.7<L>      06 Dec 2021 08:49  Phos  4.9     12-06  Mg     1.5     12-06                          10.5   11.21 )-----------( 273      ( 05 Dec 2021 07:57 )             33.4     12-05    135  |  102  |  49<H>  ----------------------------<  85  3.9   |  24  |  4.10<H>    Ca    7.6<L>      05 Dec 2021 07:57  Phos  5.1     12-04  Mg     1.7     12-05    TPro  x   /  Alb  2.5<L>  /  TBili  x   /  DBili  x   /  AST  x   /  ALT  x   /  AlkPhos  x   12-04        LIVER FUNCTIONS - ( 04 Dec 2021 07:40 )  Alb: 2.5 g/dL / Pro: x     / ALK PHOS: x     / ALT: x     / AST: x     / GGT: x                                          10.3   12.09 )-----------( 291      ( 04 Dec 2021 07:40 )             32.9     12-04    138  |  105  |  68<H>  ----------------------------<  95  3.2<L>   |  21<L>  |  5.31<H>    Ca    7.6<L>      04 Dec 2021 07:40  Phos  5.1     12-04    TPro  x   /  Alb  2.5<L>  /  TBili  x   /  DBili  x   /  AST  x   /  ALT  x   /  AlkPhos  x   12-04        LIVER FUNCTIONS - ( 04 Dec 2021 07:40 )  Alb: 2.5 g/dL / Pro: x     / ALK PHOS: x     / ALT: x     / AST: x     / GGT: x                                       10.4   10.33 )-----------( 247      ( 03 Dec 2021 07:32 )             31.3     03 Dec 2021 07:32    135    |  101    |  46     ----------------------------<  120    3.8     |  21     |  4.07     Ca    7.6        03 Dec 2021 07:32  Phos  4.9       03 Dec 2021 07:32    TPro  x      /  Alb  2.5    /  TBili  x      /  DBili  x      /  AST  x      /  ALT  x      /  AlkPhos  x      03 Dec 2021 07:32    LIVER FUNCTIONS - ( 03 Dec 2021 07:32 )  Alb: 2.5 g/dL / Pro: x     / ALK PHOS: x     / ALT: x     / AST: x     / GGT: x           MEDICATIONS  (STANDING):  amLODIPine   Tablet 10 milliGRAM(s) Oral daily  artificial  tears Solution 1 Drop(s) Both EYES two times a day  atovaquone  Suspension 1500 milliGRAM(s) Oral daily  chlorhexidine 4% Liquid 1 Application(s) Topical <User Schedule>  cloNIDine 0.1 milliGRAM(s) Oral daily  DAPTOmycin IVPB 350 milliGRAM(s) IV Intermittent every 48 hours  epoetin amee-epbx (RETACRIT) Injectable 18591 Unit(s) IV Push <User Schedule>  gabapentin 100 milliGRAM(s) Oral three times a day  heparin   Injectable 5000 Unit(s) SubCutaneous every 12 hours  heparin   Injectable. 1000 Unit(s) Dialysis. <User Schedule>  hydroxychloroquine 200 milliGRAM(s) Oral daily  influenza   Vaccine 0.5 milliLiter(s) IntraMuscular once  pantoprazole    Tablet 40 milliGRAM(s) Oral before breakfast  predniSONE   Tablet 50 milliGRAM(s) Oral daily  vancomycin    Solution 125 milliGRAM(s) Oral every 6 hours    MEDICATIONS  (PRN):  acetaminophen     Tablet .. 650 milliGRAM(s) Oral every 6 hours PRN Temp greater or equal to 38C (100.4F), Mild Pain (1 - 3)  melatonin 3 milliGRAM(s) Oral at bedtime PRN Insomnia  ondansetron Injectable 4 milliGRAM(s) IV Push every 8 hours PRN Nausea and/or Vomiting  oxyCODONE    IR 10 milliGRAM(s) Oral every 4 hours PRN Severe Pain (7 - 10)  oxyCODONE    IR 5 milliGRAM(s) Oral every 4 hours PRN Moderate Pain (4 - 6)  sodium chloride 0.9% lock flush 10 milliLiter(s) IV Push every 1 hour PRN Pre/post blood products, medications, blood draw, and to maintain line patency      RADIOLOGY & ADDITIONAL TESTS:  EXAM:  XR CHEST PORTABLE URGENT 1V                        PROCEDURE DATE:  11/29/2021    INTERPRETATION:  AP chest on November 29, 2021 at 10:55 PM. Patient is scheduled for admission.    Heart magnified by technique.    Lung fields and pleural surfaces are unremarkable.    Advanced left shoulder degeneration again noted.    Jugular line seen on November 15 is been removed.    IMPRESSION: Clear lungs. Other findings as above.        EXAM:  ECHO TTE WO CON COMP W DOPP    PROCEDURE DATE:  11/16/2021     Summary     The mitral valve leaflets appear normal; there is no evidence of stenosis,   fluttering or prolapse.   Mild (1+) mitral regurgitation is present.   Normal aortic valve structure and function.   The tricuspid valve leaflets are thin and pliable; valve motion is normal.   Mild (1+) tricuspid valve regurgitation is present.   Normal appearing pulmonic valve structure.   Trace pulmonic valvular regurgitation is present.   The left atrium appears normal.   Estimated left ventricular ejection fraction is 50-55 %.   Mild concentric left ventricular hypertrophy is present.   Pleural effusion cannot be ruled out.

## 2021-12-06 NOTE — PROGRESS NOTE ADULT - ASSESSMENT
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 SLE   suspected 2/2 IVDA  C/w Prednisone 50mg   C/w Plaquenil   F/u with Rheum outPt     4) MRSA bacteremia/IVDA  Last GRAHAM neg for vegetations   RIJ placed this admission  Most recent Bcx negative  ID eval appreciated, cleared for Tesio from MRSA stand point   IR to place Tesio, likely early next week, NPO after midnight on Sunday     5) CDIFF Diarrhea   isolate  CDIFF PCR +   GI PCR noted, no need for Tx as per ID   C/w   Vanco Po day 3   Add simethicone PRN  ID f/u appreciated       5) HTN: C/w Norvasc and Clonidine     6) Chronic pain.   On gabapentin and percocet, will change to Oxy ( not to mask fever)   Adust pain meds as needed   7)  DVT PPX: venodynes , heparin s/q    #ACP: FULL CODE.     #Dispo: - transfer to med-surg.  Tessio cath postponed, possibly for 12/8

## 2021-12-06 NOTE — PROGRESS NOTE ADULT - ASSESSMENT
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 Patient is poor historian.     1. Patient admitted for continuation of her treatment for MRSA sepsis; dialysis patient  - follow up cultures   - serial cbc and monitor temperature   - reviewed prior medical records to evaluate for resistant or atypical pathogens   - will start daptomycin 350 mg iv q 48 hours, day #6  - had echocardiogram on the recent admission  - temporary shiley at this time  - started on po vancomycin for CDiff  - stool studies noted, will not rx for these noted pathogens, continue po vancomycin, continue daptomycin  - contact isolation   - hopefully will get tessio on 12/8  2. other issues; per medicine

## 2021-12-06 NOTE — PROGRESS NOTE ADULT - ASSESSMENT
41/ F with PMHx of  bacteremia,  kidney bx proven ANCA vasculitis presumed sec to cocaine/ heroin abuse ( Cocaine + on drug tox) COPD, depression, epilepsy, GERD, Raynaud's syndrome, heroin abuse, HTN, lupus, RA, sepsis, SLE, smoker  with esrd on hd tts  anemia   htn     - ESRDS on HD TTS   tolerating hd, uf goal of 2.5 kg     k adjusted to correct the 3.2 k   - anemia on epo fu trend of h/h  - htn to dose meds now, fu trend    -    - mrsa bacteremia     bld cs from 12/1 neg    await ID ckearance    tentative clearance by ID     NPO after MN \    12/5 mK   -esrd with need for tdc     pt now cdiff pos.     IV abx per iD        41/ F with PMHx of  bacteremia,  kidney bx proven ANCA vasculitis presumed sec to cocaine/ heroin abuse ( Cocaine + on drug tox) COPD, depression, epilepsy, GERD, Raynaud's syndrome, heroin abuse, HTN, lupus, RA, sepsis, SLE, smoker  with esrd on hd tts  anemia   htn     - ESRDS on HD TTS   tolerating hd, uf goal of 2.5 kg     k adjusted to correct the 3.2 k   - anemia on epo fu trend of h/h  - htn to dose meds now, fu trend    -    - mrsa bacteremia     bld cs from 12/1 neg    await ID ckearance

## 2021-12-06 NOTE — CHART NOTE - NSCHARTNOTEFT_GEN_A_CORE
Pt originally planned for tunneled dialysis catheter today with IR. However, WBC elevated to 15.9 and pt was C.Diff positive over the weekend. IR will hold off on placement of long term tunneled dialysis catheter until pt is cleared for placement by ID. Dr. Epperson aware and agrees.

## 2021-12-06 NOTE — PROGRESS NOTE ADULT - SUBJECTIVE AND OBJECTIVE BOX
feeling ok   diarrhea subsiding    C diff + w rising WBC - IJ cahteter pout on hold per IR - awaiting ID clearance           Allergies    morphine (Rash)  morphine (Unknown)    Intolerances      Vital Signs Last 24 Hrs  T(C): 36.6 (06 Dec 2021 23:48), Max: 36.7 (06 Dec 2021 08:35)  T(F): 97.8 (06 Dec 2021 23:48), Max: 98.1 (06 Dec 2021 08:35)  HR: 90 (06 Dec 2021 23:48) (73 - 90)  BP: 137/91 (06 Dec 2021 23:48) (137/91 - 138/89)  BP(mean): --  RR: 18 (06 Dec 2021 23:48) (18 - 18)  SpO2: 100% (06 Dec 2021 23:48) (100% - 100%)    I&O's Detail    06 Dec 2021 07:01  -  07 Dec 2021 00:04  --------------------------------------------------------  IN:    Oral Fluid: 240 mL  Total IN: 240 mL    OUT:  Total OUT: 0 mL    Total NET: 240 mL          PHYSICAL EXAM:  General: alert. awake Ox3  HEENT: MMM  CV: s1s2 rrr  LUNGS: B/L CTA  EXT: no edema    LABS:                          134    |  102    |  79     ----------------------------<  78        06 Dec 2021 08:49  3.5     |  19     |  5.28     135    |  102    |  49     ----------------------------<  85        05 Dec 2021 07:57  3.9     |  24     |  4.10     138    |  105    |  68     ----------------------------<  95        04 Dec 2021 07:40  3.2     |  21     |  5.31     Ca    7.7        06 Dec 2021 08:49  Ca    7.6        05 Dec 2021 07:57    Phos  4.9       06 Dec 2021 08:49  Phos  5.1       04 Dec 2021 07:40    Mg     1.5       06 Dec 2021 08:49  Mg     1.7       05 Dec 2021 07:57    TPro  x      /  Alb  2.5    /  TBili  x      /        04 Dec 2021 07:40  DBili  x      /  AST  x      /  ALT  x      /  AlkPhos  x        TPro  x      /  Alb  2.5    /  TBili  x      /        03 Dec 2021 07:32  DBili  x      /  AST  x      /  ALT  x      /  AlkPhos  x                                  11.0   15.91 )-----------( 353      ( 06 Dec 2021 08:49 )             34.1                         10.5   11.21 )-----------( 273      ( 05 Dec 2021 07:57 )             33.4       Magnesium, Serum: 1.7 mg/dL (12-05 @ 07:57)

## 2021-12-06 NOTE — PROGRESS NOTE ADULT - SUBJECTIVE AND OBJECTIVE BOX
Date of service: 12-06-21 @ 13:50      Events noted; patient had diarrhea; none today; of note had elevated wbc today, therefore tessio placement is cancelled      ROS: no fever or chills; denies dizziness, no HA, no SOB or cough, no abdominal pain, no diarrhea or constipation; no dysuria, no urinary frequency, no legs pain, no rashes    MEDICATIONS  (STANDING):  amLODIPine   Tablet 10 milliGRAM(s) Oral daily  artificial  tears Solution 1 Drop(s) Both EYES two times a day  atovaquone  Suspension 1500 milliGRAM(s) Oral daily  chlorhexidine 4% Liquid 1 Application(s) Topical <User Schedule>  cloNIDine 0.1 milliGRAM(s) Oral daily  DAPTOmycin IVPB 350 milliGRAM(s) IV Intermittent every 48 hours  epoetin amee-epbx (RETACRIT) Injectable 72857 Unit(s) IV Push <User Schedule>  gabapentin 100 milliGRAM(s) Oral three times a day  heparin   Injectable 5000 Unit(s) SubCutaneous every 12 hours  heparin   Injectable. 1000 Unit(s) Dialysis. <User Schedule>  hydroxychloroquine 200 milliGRAM(s) Oral daily  influenza   Vaccine 0.5 milliLiter(s) IntraMuscular once  pantoprazole    Tablet 40 milliGRAM(s) Oral before breakfast  predniSONE   Tablet 50 milliGRAM(s) Oral daily  vancomycin    Solution 125 milliGRAM(s) Oral every 6 hours    MEDICATIONS  (PRN):  acetaminophen     Tablet .. 650 milliGRAM(s) Oral every 6 hours PRN Temp greater or equal to 38C (100.4F), Mild Pain (1 - 3)  melatonin 3 milliGRAM(s) Oral at bedtime PRN Insomnia  ondansetron Injectable 4 milliGRAM(s) IV Push every 8 hours PRN Nausea and/or Vomiting  oxyCODONE    IR 10 milliGRAM(s) Oral every 4 hours PRN Severe Pain (7 - 10)  oxyCODONE    IR 5 milliGRAM(s) Oral every 4 hours PRN Moderate Pain (4 - 6)  sodium chloride 0.9% lock flush 10 milliLiter(s) IV Push every 1 hour PRN Pre/post blood products, medications, blood draw, and to maintain line patency      Vital Signs Last 24 Hrs  T(C): 36.7 (06 Dec 2021 08:35), Max: 36.7 (05 Dec 2021 21:21)  T(F): 98.1 (06 Dec 2021 08:35), Max: 98.1 (06 Dec 2021 08:35)  HR: 73 (06 Dec 2021 08:35) (73 - 90)  BP: 138/89 (06 Dec 2021 08:35) (132/96 - 139/96)  BP(mean): --  RR: 18 (06 Dec 2021 08:35) (17 - 18)  SpO2: 100% (06 Dec 2021 08:35) (100% - 100%)        Physical Exam:      Constitutional: frail looking  HEENT: NC/AT, EOMI, PERRLA, conjunctivae clear; ears and nose atraumatic; pharynx clear; poor dentition  Neck: supple; thyroid not palpable  Back: no tenderness  Respiratory: respiratory effort normal; clear to auscultation  Cardiovascular: S1S2 regular, no murmurs  Abdomen: soft, not tender, not distended, positive BS; no liver or spleen organomegaly  Genitourinary: no suprapubic tenderness  Musculoskeletal: no muscle tenderness, no joint swelling or tenderness  Neurological/ Psychiatric:   moving all extremities  Skin: no rashes; no palpable lesions; too numerous to count track marks    Labs: all available labs reviewed                          Labs:                         Labs:                        11.0   15.91 )-----------( 353      ( 06 Dec 2021 08:49 )             34.1     12-06    134<L>  |  102  |  79<H>  ----------------------------<  78  3.5   |  19<L>  |  5.28<H>    Ca    7.7<L>      06 Dec 2021 08:49  Phos  4.9     12-06  Mg     1.5     12-06             Cultures:       GI PCR Panel, Stool (collected 12-02-21 @ 15:33)  Source: .Stool Feces  Final Report (12-05-21 @ 23:30):    Yersinia enterocolitica GI PCR Results: INDETERMINATE    *******Please Note:*******    An indeterminate result may be due to a false    positive PCR test, the presence    of amplification inhibitors in the sample or levels of    target DNA present below the limit of detection.    This result does not preclude the possibility of infection.    Please submit an additional sample for repeat testing.    GI panel PCR evaluates for:    Campylobacter, Plesiomonas shigelloides, Salmonella,    Vibrio, Yersinia enterocolitica, Enteroaggregative    Escherichia coli (EAEC), Enteropathogenic E.coli (EPEC),    Enterotoxigenic E. coli (ETEC) lt/st, Shiga-like    toxin-producing E. coli (STEC) stx1/stx2,    Shigella/ Enteroinvasive E. coli (EIEC), Cryptosporidium,    Cyclospora cayetanensis, Entamoeba histolytica,    Giardia lamblia, Adenovirus F 40/41, Astrovirus,    Norovirus GI/GII, Rotavirus A, Sapovirus    .    Test results are to be confirmed by an alternate method.    .    Enteropathogenic E. coli (EPEC)    DETECTED by PCR    *******PleaseNote:*******    GI panel PCR evaluates for:    Campylobacter, Plesiomonas shigelloides, Salmonella,    Vibrio, Yersinia enterocolitica, Enteroaggregative    Escherichia coli (EAEC), Enteropathogenic E.coli (EPEC),    Enterotoxigenic E. coli (ETEC) lt/st, Shiga-like    toxin-producing E. coli (STEC) stx1/stx2,    Shigella/ Enteroinvasive E. coli (EIEC), Cryptosporidium,    Cyclospora cayetanensis, Entamoeba histolytica,    Giardia lamblia, Adenovirus F 40/41, Astrovirus,    Norovirus GI/GII, Rotavirus A, Sapovirus    Culture - Blood (collected 12-01-21 @ 08:48)  Clostridium difficile Toxin by PCR (12.03.21 @ 13:30)    Clostridium difficile Toxin by PCR: The results of this test should be interpreted with consideration of all  clinical and laboratory findings. This test determines the presence of  the C. difficile tcdB gene at a given time and is not intended to  identify antibiotic associated disease or C. difficile infection without  clinical context.  Successful treatment is based on the resolution of clinical symptoms.  This test should not be used as a "test of cure" because C. difficile DNA  will persist after successful treatment. Repeat testing will not be  permitted.    This Real-Time PCR assay is FDA-approved, and its performance has been  established by [facility] [employer], [place], [place].    C Diff by PCR Result: Detected    Source: .Blood None  Final Report (12-06-21 @ 13:00):    No Growth Final    Culture - Blood (collected 12-01-21 @ 08:48)  Source: .Blood None  Final Report (12-06-21 @ 13:00):    No Growth Final              Radiology: all available radiological tests reviewed    Advanced directives addressed: full resuscitation

## 2021-12-07 LAB
ALBUMIN SERPL ELPH-MCNC: 2.8 G/DL — LOW (ref 3.3–5)
ANION GAP SERPL CALC-SCNC: 14 MMOL/L — SIGNIFICANT CHANGE UP (ref 5–17)
BUN SERPL-MCNC: 99 MG/DL — HIGH (ref 7–23)
CALCIUM SERPL-MCNC: 7.3 MG/DL — LOW (ref 8.5–10.1)
CHLORIDE SERPL-SCNC: 101 MMOL/L — SIGNIFICANT CHANGE UP (ref 96–108)
CO2 SERPL-SCNC: 20 MMOL/L — LOW (ref 22–31)
CREAT SERPL-MCNC: 6.1 MG/DL — HIGH (ref 0.5–1.3)
CULTURE RESULTS: SIGNIFICANT CHANGE UP
GLUCOSE SERPL-MCNC: 82 MG/DL — SIGNIFICANT CHANGE UP (ref 70–99)
HCT VFR BLD CALC: 31.4 % — LOW (ref 34.5–45)
HGB BLD-MCNC: 9.9 G/DL — LOW (ref 11.5–15.5)
MCHC RBC-ENTMCNC: 29.5 PG — SIGNIFICANT CHANGE UP (ref 27–34)
MCHC RBC-ENTMCNC: 31.5 GM/DL — LOW (ref 32–36)
MCV RBC AUTO: 93.5 FL — SIGNIFICANT CHANGE UP (ref 80–100)
PHOSPHATE SERPL-MCNC: 5.1 MG/DL — HIGH (ref 2.5–4.5)
PLATELET # BLD AUTO: 280 K/UL — SIGNIFICANT CHANGE UP (ref 150–400)
POTASSIUM SERPL-MCNC: 3.3 MMOL/L — LOW (ref 3.5–5.3)
POTASSIUM SERPL-SCNC: 3.3 MMOL/L — LOW (ref 3.5–5.3)
RBC # BLD: 3.36 M/UL — LOW (ref 3.8–5.2)
RBC # FLD: 18.9 % — HIGH (ref 10.3–14.5)
SODIUM SERPL-SCNC: 135 MMOL/L — SIGNIFICANT CHANGE UP (ref 135–145)
WBC # BLD: 18.45 K/UL — HIGH (ref 3.8–10.5)
WBC # FLD AUTO: 18.45 K/UL — HIGH (ref 3.8–10.5)

## 2021-12-07 PROCEDURE — 99233 SBSQ HOSP IP/OBS HIGH 50: CPT

## 2021-12-07 RX ORDER — ALBUTEROL 90 UG/1
2.5 AEROSOL, METERED ORAL ONCE
Refills: 0 | Status: DISCONTINUED | OUTPATIENT
Start: 2021-12-07 | End: 2021-12-08

## 2021-12-07 RX ORDER — CEFTRIAXONE 500 MG/1
1000 INJECTION, POWDER, FOR SOLUTION INTRAMUSCULAR; INTRAVENOUS EVERY 24 HOURS
Refills: 0 | Status: COMPLETED | OUTPATIENT
Start: 2021-12-07 | End: 2021-12-09

## 2021-12-07 RX ADMIN — MAGNESIUM OXIDE 400 MG ORAL TABLET 400 MILLIGRAM(S): 241.3 TABLET ORAL at 01:33

## 2021-12-07 RX ADMIN — ERYTHROPOIETIN 10000 UNIT(S): 10000 INJECTION, SOLUTION INTRAVENOUS; SUBCUTANEOUS at 08:27

## 2021-12-07 RX ADMIN — OXYCODONE HYDROCHLORIDE 10 MILLIGRAM(S): 5 TABLET ORAL at 18:10

## 2021-12-07 RX ADMIN — OXYCODONE HYDROCHLORIDE 5 MILLIGRAM(S): 5 TABLET ORAL at 10:14

## 2021-12-07 RX ADMIN — PANTOPRAZOLE SODIUM 40 MILLIGRAM(S): 20 TABLET, DELAYED RELEASE ORAL at 05:36

## 2021-12-07 RX ADMIN — CEFTRIAXONE 1000 MILLIGRAM(S): 500 INJECTION, POWDER, FOR SOLUTION INTRAMUSCULAR; INTRAVENOUS at 14:18

## 2021-12-07 RX ADMIN — GABAPENTIN 100 MILLIGRAM(S): 400 CAPSULE ORAL at 05:33

## 2021-12-07 RX ADMIN — GABAPENTIN 100 MILLIGRAM(S): 400 CAPSULE ORAL at 14:17

## 2021-12-07 RX ADMIN — MAGNESIUM OXIDE 400 MG ORAL TABLET 400 MILLIGRAM(S): 241.3 TABLET ORAL at 05:37

## 2021-12-07 RX ADMIN — DAPTOMYCIN 114 MILLIGRAM(S): 500 INJECTION, POWDER, LYOPHILIZED, FOR SOLUTION INTRAVENOUS at 14:50

## 2021-12-07 RX ADMIN — OXYCODONE HYDROCHLORIDE 10 MILLIGRAM(S): 5 TABLET ORAL at 10:15

## 2021-12-07 RX ADMIN — AMLODIPINE BESYLATE 10 MILLIGRAM(S): 2.5 TABLET ORAL at 13:28

## 2021-12-07 RX ADMIN — OXYCODONE HYDROCHLORIDE 10 MILLIGRAM(S): 5 TABLET ORAL at 01:34

## 2021-12-07 RX ADMIN — CHLORHEXIDINE GLUCONATE 1 APPLICATION(S): 213 SOLUTION TOPICAL at 05:33

## 2021-12-07 RX ADMIN — Medication 125 MILLIGRAM(S): at 05:33

## 2021-12-07 RX ADMIN — ATOVAQUONE 1500 MILLIGRAM(S): 750 SUSPENSION ORAL at 13:27

## 2021-12-07 RX ADMIN — Medication 125 MILLIGRAM(S): at 13:30

## 2021-12-07 RX ADMIN — Medication 125 MILLIGRAM(S): at 18:08

## 2021-12-07 RX ADMIN — GABAPENTIN 100 MILLIGRAM(S): 400 CAPSULE ORAL at 22:05

## 2021-12-07 RX ADMIN — Medication 50 MILLIGRAM(S): at 13:28

## 2021-12-07 RX ADMIN — OXYCODONE HYDROCHLORIDE 10 MILLIGRAM(S): 5 TABLET ORAL at 22:04

## 2021-12-07 RX ADMIN — Medication 3 MILLIGRAM(S): at 22:05

## 2021-12-07 RX ADMIN — OXYCODONE HYDROCHLORIDE 10 MILLIGRAM(S): 5 TABLET ORAL at 05:34

## 2021-12-07 RX ADMIN — HEPARIN SODIUM 5000 UNIT(S): 5000 INJECTION INTRAVENOUS; SUBCUTANEOUS at 13:30

## 2021-12-07 RX ADMIN — Medication 200 MILLIGRAM(S): at 13:28

## 2021-12-07 RX ADMIN — OXYCODONE HYDROCHLORIDE 5 MILLIGRAM(S): 5 TABLET ORAL at 10:51

## 2021-12-07 RX ADMIN — HEPARIN SODIUM 5000 UNIT(S): 5000 INJECTION INTRAVENOUS; SUBCUTANEOUS at 22:05

## 2021-12-07 RX ADMIN — Medication 1 DROP(S): at 14:55

## 2021-12-07 RX ADMIN — OXYCODONE HYDROCHLORIDE 10 MILLIGRAM(S): 5 TABLET ORAL at 14:17

## 2021-12-07 RX ADMIN — OXYCODONE HYDROCHLORIDE 10 MILLIGRAM(S): 5 TABLET ORAL at 10:38

## 2021-12-07 RX ADMIN — Medication 125 MILLIGRAM(S): at 01:35

## 2021-12-07 RX ADMIN — HEPARIN SODIUM 1000 UNIT(S): 5000 INJECTION INTRAVENOUS; SUBCUTANEOUS at 08:20

## 2021-12-07 RX ADMIN — Medication 0.1 MILLIGRAM(S): at 13:28

## 2021-12-07 RX ADMIN — Medication 1 DROP(S): at 22:07

## 2021-12-07 NOTE — PROGRESS NOTE ADULT - ASSESSMENT
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 SLE   suspected 2/2 IVDA  C/w Prednisone 50mg   C/w Plaquenil   F/u with Rheum outPt     4) MRSA bacteremia/IVDA  Last GRAHAM neg for vegetations   RIJ placed this admission  Most recent Bcx negative  ID eval appreciated, cleared for Tesio from MRSA stand point   IR to place Tesio, likely early next week, NPO after midnight on Sunday     5) CDIFF Diarrhea   isolate  CDIFF PCR +   GI PCR noted, no need for Tx as per ID   C/w   Vanco Po day 3   Add simethicone PRN  ID f/u appreciated       5) HTN: C/w Norvasc and Clonidine     6) Chronic pain.   On gabapentin and percocet, will change to Oxy ( not to mask fever)   Adust pain meds as needed   7)  DVT PPX: venodynes , heparin s/q    #ACP: FULL CODE.     #Dispo: - transfer to med-surg.  Tessio cath postponed, possibly for 12/8 41/ F with PMHx of  bacteremia,  kidney bx proven ANCA vasculitis presumed sec to cocaine/ heroin abuse,  COPD, depression, epilepsy, GERD, Raynaud's syndrome, heroin abuse, HTN, RA, sepsis, SLE, smoker, ESRD admitted for:     1) Hyperkalemia 6.1, resolved /Hypokalemia, corrected with HD  sp lokelma, insulin cocktail and HD on admission  K levels  WNL this am      2) ESRD on HD   S/p temporary  HD catheter placement by IR   HD as per renal   Tessio cath placement canceled due to leukocytosis and CDIFF  diarrhea, will re eval in am   NPO after MN       3) P-ANCA associated vasculitis. SLE   suspected 2/2 IVDA  C/w Prednisone 50mg   C/w Plaquenil   F/u with Rheum outPt     4) MRSA bacteremia/IVDA  Last GRAHAM neg for vegetations   RIJ placed this admission  Most recent Bcx negative  ID eval appreciated, cleared for Tesio from MRSA stand point   IR to place Tesio      5) Infectious  Diarrhea + CDiff, Yersinia and ECOLI  contact isolation   CDIFF PCR +   GI PCR noted  C/w   Vanco Po day 3   C/w  simethicone PRN  D/w Dr Harrington, as WBCs still Trending up will give 3 days of Ceftriaxone to cover GN bacteria        5) HTN: C/w Norvasc and Clonidine     6) Chronic pain.   On gabapentin and percocet, will change to Oxy ( not to mask fever)   Adust pain meds as needed   7)  DVT PPX: venodynes , heparin s/q    #ACP: FULL CODE.     #Dispo:  NPO after midnight for possible Tessio cath placement, pending WBCs in am. Hold DVT PPX

## 2021-12-07 NOTE — PROGRESS NOTE ADULT - ASSESSMENT
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 Patient is poor historian.     1. Patient admitted for continuation of her treatment for MRSA sepsis; dialysis patient  - follow up cultures   - serial cbc and monitor temperature   - reviewed prior medical records to evaluate for resistant or atypical pathogens   - will start daptomycin 350 mg iv q 48 hours, day #7  - check cpk in am  - had echocardiogram on the recent admission  - temporary shiley at this time  - started on po vancomycin for CDiff  - stool studies noted, given increasing wbc, will give ceftriaxone one gram daily, for 3 days; unclear why patient has these organisms in her stool studies as well as cdiff  - contact isolation   - hopefully will get tessio on 12/8  2. other issues; per medicine

## 2021-12-07 NOTE — PROGRESS NOTE ADULT - SUBJECTIVE AND OBJECTIVE BOX
Patient is a 41y Female who reports no complaints as new, seen with HD. No new events, reported.       MEDICATIONS  (STANDING):  amLODIPine   Tablet 10 milliGRAM(s) Oral daily  artificial  tears Solution 1 Drop(s) Both EYES two times a day  atovaquone  Suspension 1500 milliGRAM(s) Oral daily  chlorhexidine 4% Liquid 1 Application(s) Topical <User Schedule>  cloNIDine 0.1 milliGRAM(s) Oral daily  DAPTOmycin IVPB 350 milliGRAM(s) IV Intermittent every 48 hours  epoetin amee-epbx (RETACRIT) Injectable 81497 Unit(s) IV Push <User Schedule>  gabapentin 100 milliGRAM(s) Oral three times a day  heparin   Injectable 5000 Unit(s) SubCutaneous every 12 hours  heparin   Injectable. 1000 Unit(s) Dialysis. <User Schedule>  hydroxychloroquine 200 milliGRAM(s) Oral daily  influenza   Vaccine 0.5 milliLiter(s) IntraMuscular once  pantoprazole    Tablet 40 milliGRAM(s) Oral before breakfast  predniSONE   Tablet 50 milliGRAM(s) Oral daily  vancomycin    Solution 125 milliGRAM(s) Oral every 6 hours    MEDICATIONS  (PRN):  acetaminophen     Tablet .. 650 milliGRAM(s) Oral every 6 hours PRN Temp greater or equal to 38C (100.4F), Mild Pain (1 - 3)  melatonin 3 milliGRAM(s) Oral at bedtime PRN Insomnia  ondansetron Injectable 4 milliGRAM(s) IV Push every 8 hours PRN Nausea and/or Vomiting  oxyCODONE    IR 10 milliGRAM(s) Oral every 4 hours PRN Severe Pain (7 - 10)  oxyCODONE    IR 5 milliGRAM(s) Oral every 4 hours PRN Moderate Pain (4 - 6)  simethicone 80 milliGRAM(s) Chew three times a day PRN Gas  sodium chloride 0.9% lock flush 10 milliLiter(s) IV Push every 1 hour PRN Pre/post blood products, medications, blood draw, and to maintain line patency        T(C): , Max: 36.7 (12-06-21 @ 17:00)  T(F): , Max: 98.1 (12-06-21 @ 17:00)  HR: 77 (12-07-21 @ 10:57)  BP: 140/88 (12-07-21 @ 10:57)  BP(mean): --  RR: 16 (12-07-21 @ 10:57)  SpO2: 100% (12-07-21 @ 06:12)  Wt(kg): --    12-06 @ 07:01  -  12-07 @ 07:00  --------------------------------------------------------  IN: 240 mL / OUT: 0 mL / NET: 240 mL          PHYSICAL EXAM:    Constitutional: NAD, frail. >>stated age  HEENT: dry MM  Neck: No LAD, No JVD  Respiratory: dist  Cardiovascular: S1 and S2  Extremities: No peripheral edema  Neurological: Alert  : No Salgado  Skin: No rashes  Access: temp cath        LABS:                        9.9    18.45 )-----------( 280      ( 07 Dec 2021 07:55 )             31.4     07 Dec 2021 07:55    135    |  101    |  99     ----------------------------<  82     3.3     |  20     |  6.10   06 Dec 2021 08:49    134    |  102    |  79     ----------------------------<  78     3.5     |  19     |  5.28   05 Dec 2021 07:57    135    |  102    |  49     ----------------------------<  85     3.9     |  24     |  4.10   04 Dec 2021 07:40    138    |  105    |  68     ----------------------------<  95     3.2     |  21     |  5.31     Ca    7.3        07 Dec 2021 07:55  Ca    7.7        06 Dec 2021 08:49  Ca    7.6        05 Dec 2021 07:57  Ca    7.6        04 Dec 2021 07:40  Phos  5.1       07 Dec 2021 07:55  Phos  4.9       06 Dec 2021 08:49  Phos  5.1       04 Dec 2021 07:40  Mg     1.5       06 Dec 2021 08:49  Mg     1.7       05 Dec 2021 07:57    TPro  x      /  Alb  2.8    /  TBili  x      /  DBili  x      /  AST  x      /  ALT  x      /  AlkPhos  x      07 Dec 2021 07:55  TPro  x      /  Alb  2.5    /  TBili  x      /  DBili  x      /  AST  x      /  ALT  x      /  AlkPhos  x      04 Dec 2021 07:40          Urine Studies:          RADIOLOGY & ADDITIONAL STUDIES:

## 2021-12-07 NOTE — PROGRESS NOTE ADULT - SUBJECTIVE AND OBJECTIVE BOX
Date of service: 12-07-21 @ 13:55    Patient seen in dialysis , just finished dialysis, has mild abdominal cramping; reviewed dietary history; does not eat sushi, ate turkey at Thanksgiving, no one else at home with diarrheal illness  Afebrile, no other specific complaints        ROS: no fever or chills; denies dizziness, no HA, no SOB or cough, no diarrhea or constipation; no dysuria, no urinary frequency, no legs pain, no rashes    MEDICATIONS  (STANDING):  amLODIPine   Tablet 10 milliGRAM(s) Oral daily  artificial  tears Solution 1 Drop(s) Both EYES two times a day  atovaquone  Suspension 1500 milliGRAM(s) Oral daily  cefTRIAXone Injectable. 1000 milliGRAM(s) IV Push every 24 hours  chlorhexidine 4% Liquid 1 Application(s) Topical <User Schedule>  cloNIDine 0.1 milliGRAM(s) Oral daily  DAPTOmycin IVPB 350 milliGRAM(s) IV Intermittent every 48 hours  epoetin amee-epbx (RETACRIT) Injectable 68799 Unit(s) IV Push <User Schedule>  gabapentin 100 milliGRAM(s) Oral three times a day  heparin   Injectable 5000 Unit(s) SubCutaneous every 12 hours  heparin   Injectable. 1000 Unit(s) Dialysis. <User Schedule>  hydroxychloroquine 200 milliGRAM(s) Oral daily  influenza   Vaccine 0.5 milliLiter(s) IntraMuscular once  pantoprazole    Tablet 40 milliGRAM(s) Oral before breakfast  predniSONE   Tablet 50 milliGRAM(s) Oral daily  vancomycin    Solution 125 milliGRAM(s) Oral every 6 hours    MEDICATIONS  (PRN):  acetaminophen     Tablet .. 650 milliGRAM(s) Oral every 6 hours PRN Temp greater or equal to 38C (100.4F), Mild Pain (1 - 3)  melatonin 3 milliGRAM(s) Oral at bedtime PRN Insomnia  ondansetron Injectable 4 milliGRAM(s) IV Push every 8 hours PRN Nausea and/or Vomiting  oxyCODONE    IR 10 milliGRAM(s) Oral every 4 hours PRN Severe Pain (7 - 10)  oxyCODONE    IR 5 milliGRAM(s) Oral every 4 hours PRN Moderate Pain (4 - 6)  simethicone 80 milliGRAM(s) Chew three times a day PRN Gas  sodium chloride 0.9% lock flush 10 milliLiter(s) IV Push every 1 hour PRN Pre/post blood products, medications, blood draw, and to maintain line patency      Vital Signs Last 24 Hrs  T(C): 36.6 (07 Dec 2021 12:15), Max: 36.7 (06 Dec 2021 17:00)  T(F): 97.8 (07 Dec 2021 12:15), Max: 98.1 (06 Dec 2021 17:00)  HR: 82 (07 Dec 2021 12:15) (77 - 96)  BP: 152/90 (07 Dec 2021 12:15) (134/99 - 152/90)  BP(mean): --  RR: 18 (07 Dec 2021 12:15) (16 - 18)  SpO2: 100% (07 Dec 2021 06:12) (100% - 100%)        Physical Exam:      Constitutional: frail looking  HEENT: NC/AT, EOMI, PERRLA, conjunctivae clear; ears and nose atraumatic; pharynx clear; poor dentition  Neck: supple; thyroid not palpable  Back: no tenderness  Respiratory: respiratory effort normal; clear to auscultation  Cardiovascular: S1S2 regular, no murmurs  Abdomen: soft, not tender, not distended, positive BS; no liver or spleen organomegaly  Genitourinary: no suprapubic tenderness  Musculoskeletal: no muscle tenderness, no joint swelling or tenderness  Neurological/ Psychiatric:   moving all extremities  Skin: no rashes; no palpable lesions; too numerous to count track marks    Labs: all available labs reviewed                          Labs:                         Labs:                        11.0   15.91 )-----------( 353      ( 06 Dec 2021 08:49 )             34.1     12-06    134<L>  |  102  |  79<H>  ----------------------------<  78  3.5   |  19<L>  |  5.28<H>    Ca    7.7<L>      06 Dec 2021 08:49  Phos  4.9     12-06  Mg     1.5     12-06             Cultures:       GI PCR Panel, Stool (collected 12-02-21 @ 15:33)  Source: .Stool Feces  Final Report (12-05-21 @ 23:30):    Yersinia enterocolitica GI PCR Results: INDETERMINATE    *******Please Note:*******    An indeterminate result may be due to a false    positive PCR test, the presence    of amplification inhibitors in the sample or levels of    target DNA present below the limit of detection.    This result does not preclude the possibility of infection.    Please submit an additional sample for repeat testing.    GI panel PCR evaluates for:    Campylobacter, Plesiomonas shigelloides, Salmonella,    Vibrio, Yersinia enterocolitica, Enteroaggregative    Escherichia coli (EAEC), Enteropathogenic E.coli (EPEC),    Enterotoxigenic E. coli (ETEC) lt/st, Shiga-like    toxin-producing E. coli (STEC) stx1/stx2,    Shigella/ Enteroinvasive E. coli (EIEC), Cryptosporidium,    Cyclospora cayetanensis, Entamoeba histolytica,    Giardia lamblia, Adenovirus F 40/41, Astrovirus,    Norovirus GI/GII, Rotavirus A, Sapovirus    .    Test results are to be confirmed by an alternate method.    .    Enteropathogenic E. coli (EPEC)    DETECTED by PCR    *******PleaseNote:*******    GI panel PCR evaluates for:    Campylobacter, Plesiomonas shigelloides, Salmonella,    Vibrio, Yersinia enterocolitica, Enteroaggregative    Escherichia coli (EAEC), Enteropathogenic E.coli (EPEC),    Enterotoxigenic E. coli (ETEC) lt/st, Shiga-like    toxin-producing E. coli (STEC) stx1/stx2,    Shigella/ Enteroinvasive E. coli (EIEC), Cryptosporidium,    Cyclospora cayetanensis, Entamoeba histolytica,    Giardia lamblia, Adenovirus F 40/41, Astrovirus,    Norovirus GI/GII, Rotavirus A, Sapovirus    Culture - Blood (collected 12-01-21 @ 08:48)  Clostridium difficile Toxin by PCR (12.03.21 @ 13:30)    Clostridium difficile Toxin by PCR: The results of this test should be interpreted with consideration of all  clinical and laboratory findings. This test determines the presence of  the C. difficile tcdB gene at a given time and is not intended to  identify antibiotic associated disease or C. difficile infection without  clinical context.  Successful treatment is based on the resolution of clinical symptoms.  This test should not be used as a "test of cure" because C. difficile DNA  will persist after successful treatment. Repeat testing will not be  permitted.    This Real-Time PCR assay is FDA-approved, and its performance has been  established by [facility], [place], [place].    C Diff by PCR Result: Detected    Source: .Blood None  Final Report (12-06-21 @ 13:00):    No Growth Final    Culture - Blood (collected 12-01-21 @ 08:48)  Source: .Blood None  Final Report (12-06-21 @ 13:00):    No Growth Final              Radiology: all available radiological tests reviewed    Advanced directives addressed: full resuscitation

## 2021-12-07 NOTE — PROGRESS NOTE ADULT - ASSESSMENT
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

## 2021-12-07 NOTE — PROGRESS NOTE ADULT - SUBJECTIVE AND OBJECTIVE BOX
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 Found to have K of 6.1. She denies any cp/n/v. c/o some SOB and leg edema.     INTERVAL HPI/ OVERNIGHT EVENTS: Pt was seen and examined, reports no loose stool since yesterday but still has cramps and feels gassy. NO fevers.  POC discussed     Vital Signs Last 24 Hrs  T(C): 36.7 (07 Dec 2021 08:10), Max: 36.7 (06 Dec 2021 17:00)  T(F): 98 (07 Dec 2021 08:10), Max: 98.1 (06 Dec 2021 17:00)  HR: 79 (07 Dec 2021 09:40) (78 - 96)  BP: 134/99 (07 Dec 2021 09:40) (134/99 - 144/103)  RR: 18 (07 Dec 2021 09:40) (16 - 18)  SpO2: 100% (07 Dec 2021 06:12) (100% - 100%)        REVIEW OF SYSTEMS:  All other review of systems is negative unless indicated above.      PHYSICAL EXAM:  General: Well developed; looks chronically ill,  in no acute distress, but uncomfortable  Eyes:  EOMI; conjunctiva and sclera clear  Head: Normocephalic; atraumatic  ENMT: No nasal discharge; airway clear  Neck: Supple; non tender; no masses  Respiratory: No wheezes, rales or rhonchi  Cardiovascular: Regular rate and rhythm. S1 and S2 Normal;  Gastrointestinal: Soft, non-tender non-distended; Normal bowel sounds  Genitourinary: No  suprapubic  tenderness  Extremities: + edema  Vascular: Peripheral pulses palpable 2+ bilaterally  Neurological: Alert and oriented x3, non focal   Skin: Warm and dry.   Musculoskeletal: Normal muscle tone  Psychiatric: Cooperative    LABS:                           9.9    18.45 )-----------( 280      ( 07 Dec 2021 07:55 )             31.4     12-07    135  |  101  |  99<H>  ----------------------------<  82  3.3<L>   |  20<L>  |  6.10<H>    Ca    7.3<L>      07 Dec 2021 07:55  Phos  5.1     12-07  Mg     1.5     12-06    TPro  x   /  Alb  2.8<L>  /  TBili  x   /  DBili  x   /  AST  x   /  ALT  x   /  AlkPhos  x   12-07    LIVER FUNCTIONS - ( 07 Dec 2021 07:55 )  Alb: 2.8 g/dL / Pro: x     / ALK PHOS: x     / ALT: x     / AST: x     / GGT: x                                   11.0   15.91 )-----------( 353      ( 06 Dec 2021 08:49 )             34.1     12-06    134<L>  |  102  |  79<H>  ----------------------------<  78  3.5   |  19<L>  |  5.28<H>    Ca    7.7<L>      06 Dec 2021 08:49  Phos  4.9     12-06  Mg     1.5     12-06                          10.5   11.21 )-----------( 273      ( 05 Dec 2021 07:57 )             33.4     12-05    135  |  102  |  49<H>  ----------------------------<  85  3.9   |  24  |  4.10<H>    Ca    7.6<L>      05 Dec 2021 07:57  Phos  5.1     12-04  Mg     1.7     12-05    TPro  x   /  Alb  2.5<L>  /  TBili  x   /  DBili  x   /  AST  x   /  ALT  x   /  AlkPhos  x   12-04        LIVER FUNCTIONS - ( 04 Dec 2021 07:40 )  Alb: 2.5 g/dL / Pro: x     / ALK PHOS: x     / ALT: x     / AST: x     / GGT: x                                          10.3   12.09 )-----------( 291      ( 04 Dec 2021 07:40 )             32.9     12-04    138  |  105  |  68<H>  ----------------------------<  95  3.2<L>   |  21<L>  |  5.31<H>    Ca    7.6<L>      04 Dec 2021 07:40  Phos  5.1     12-04    TPro  x   /  Alb  2.5<L>  /  TBili  x   /  DBili  x   /  AST  x   /  ALT  x   /  AlkPhos  x   12-04        LIVER FUNCTIONS - ( 04 Dec 2021 07:40 )  Alb: 2.5 g/dL / Pro: x     / ALK PHOS: x     / ALT: x     / AST: x     / GGT: x                                       10.4   10.33 )-----------( 247      ( 03 Dec 2021 07:32 )             31.3     03 Dec 2021 07:32    135    |  101    |  46     ----------------------------<  120    3.8     |  21     |  4.07     Ca    7.6        03 Dec 2021 07:32  Phos  4.9       03 Dec 2021 07:32    TPro  x      /  Alb  2.5    /  TBili  x      /  DBili  x      /  AST  x      /  ALT  x      /  AlkPhos  x      03 Dec 2021 07:32    LIVER FUNCTIONS - ( 03 Dec 2021 07:32 )  Alb: 2.5 g/dL / Pro: x     / ALK PHOS: x     / ALT: x     / AST: x     / GGT: x           MEDICATIONS  (STANDING):  amLODIPine   Tablet 10 milliGRAM(s) Oral daily  artificial  tears Solution 1 Drop(s) Both EYES two times a day  atovaquone  Suspension 1500 milliGRAM(s) Oral daily  chlorhexidine 4% Liquid 1 Application(s) Topical <User Schedule>  cloNIDine 0.1 milliGRAM(s) Oral daily  DAPTOmycin IVPB 350 milliGRAM(s) IV Intermittent every 48 hours  epoetin amee-epbx (RETACRIT) Injectable 56545 Unit(s) IV Push <User Schedule>  gabapentin 100 milliGRAM(s) Oral three times a day  heparin   Injectable 5000 Unit(s) SubCutaneous every 12 hours  heparin   Injectable. 1000 Unit(s) Dialysis. <User Schedule>  hydroxychloroquine 200 milliGRAM(s) Oral daily  influenza   Vaccine 0.5 milliLiter(s) IntraMuscular once  pantoprazole    Tablet 40 milliGRAM(s) Oral before breakfast  predniSONE   Tablet 50 milliGRAM(s) Oral daily  vancomycin    Solution 125 milliGRAM(s) Oral every 6 hours    MEDICATIONS  (PRN):  acetaminophen     Tablet .. 650 milliGRAM(s) Oral every 6 hours PRN Temp greater or equal to 38C (100.4F), Mild Pain (1 - 3)  melatonin 3 milliGRAM(s) Oral at bedtime PRN Insomnia  ondansetron Injectable 4 milliGRAM(s) IV Push every 8 hours PRN Nausea and/or Vomiting  oxyCODONE    IR 10 milliGRAM(s) Oral every 4 hours PRN Severe Pain (7 - 10)  oxyCODONE    IR 5 milliGRAM(s) Oral every 4 hours PRN Moderate Pain (4 - 6)  sodium chloride 0.9% lock flush 10 milliLiter(s) IV Push every 1 hour PRN Pre/post blood products, medications, blood draw, and to maintain line patency      RADIOLOGY & ADDITIONAL TESTS:  EXAM:  XR CHEST PORTABLE URGENT 1V                        PROCEDURE DATE:  11/29/2021    INTERPRETATION:  AP chest on November 29, 2021 at 10:55 PM. Patient is scheduled for admission.    Heart magnified by technique.    Lung fields and pleural surfaces are unremarkable.    Advanced left shoulder degeneration again noted.    Jugular line seen on November 15 is been removed.    IMPRESSION: Clear lungs. Other findings as above.        EXAM:  ECHO TTE WO CON COMP W DOPP    PROCEDURE DATE:  11/16/2021     Summary     The mitral valve leaflets appear normal; there is no evidence of stenosis,   fluttering or prolapse.   Mild (1+) mitral regurgitation is present.   Normal aortic valve structure and function.   The tricuspid valve leaflets are thin and pliable; valve motion is normal.   Mild (1+) tricuspid valve regurgitation is present.   Normal appearing pulmonic valve structure.   Trace pulmonic valvular regurgitation is present.   The left atrium appears normal.   Estimated left ventricular ejection fraction is 50-55 %.   Mild concentric left ventricular hypertrophy is present.   Pleural effusion cannot be ruled out.     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 Found to have K of 6.1. She denies any cp/n/v. c/o some SOB and leg edema.     INTERVAL HPI/ OVERNIGHT EVENTS: Pt was seen and examined, S/p HD today, reports no diarrhea, cramps improved still gassy. No fevers or chills. POC discussed     Vital Signs Last 24 Hrs  T(C): 36.7 (07 Dec 2021 08:10), Max: 36.7 (06 Dec 2021 17:00)  T(F): 98 (07 Dec 2021 08:10), Max: 98.1 (06 Dec 2021 17:00)  HR: 79 (07 Dec 2021 09:40) (78 - 96)  BP: 134/99 (07 Dec 2021 09:40) (134/99 - 144/103)  RR: 18 (07 Dec 2021 09:40) (16 - 18)  SpO2: 100% (07 Dec 2021 06:12) (100% - 100%)        REVIEW OF SYSTEMS:  All other review of systems is negative unless indicated above.      PHYSICAL EXAM:  General: Well developed; looks chronically ill,  in no acute distress, but uncomfortable  Eyes:  EOMI; conjunctiva and sclera clear  Head: Normocephalic; atraumatic  ENMT: No nasal discharge; airway clear  Neck: Supple; non tender; no masses  Respiratory: No wheezes, rales or rhonchi  Cardiovascular: Regular rate and rhythm. S1 and S2 Normal;  Gastrointestinal: Soft, non-tender non-distended; Normal bowel sounds  Genitourinary: No  suprapubic  tenderness  Extremities: + edema  Vascular: Peripheral pulses palpable 2+ bilaterally  Neurological: Alert and oriented x3, non focal   Skin: Warm and dry.   Musculoskeletal: Normal muscle tone  Psychiatric: Cooperative    LABS:                           9.9    18.45 )-----------( 280      ( 07 Dec 2021 07:55 )             31.4     12-07    135  |  101  |  99<H>  ----------------------------<  82  3.3<L>   |  20<L>  |  6.10<H>    Ca    7.3<L>      07 Dec 2021 07:55  Phos  5.1     12-07  Mg     1.5     12-06    TPro  x   /  Alb  2.8<L>  /  TBili  x   /  DBili  x   /  AST  x   /  ALT  x   /  AlkPhos  x   12-07    LIVER FUNCTIONS - ( 07 Dec 2021 07:55 )  Alb: 2.8 g/dL / Pro: x     / ALK PHOS: x     / ALT: x     / AST: x     / GGT: x                                   11.0   15.91 )-----------( 353      ( 06 Dec 2021 08:49 )             34.1     12-06    134<L>  |  102  |  79<H>  ----------------------------<  78  3.5   |  19<L>  |  5.28<H>    Ca    7.7<L>      06 Dec 2021 08:49  Phos  4.9     12-06  Mg     1.5     12-06                          10.5   11.21 )-----------( 273      ( 05 Dec 2021 07:57 )             33.4     12-05    135  |  102  |  49<H>  ----------------------------<  85  3.9   |  24  |  4.10<H>    Ca    7.6<L>      05 Dec 2021 07:57  Phos  5.1     12-04  Mg     1.7     12-05    TPro  x   /  Alb  2.5<L>  /  TBili  x   /  DBili  x   /  AST  x   /  ALT  x   /  AlkPhos  x   12-04        LIVER FUNCTIONS - ( 04 Dec 2021 07:40 )  Alb: 2.5 g/dL / Pro: x     / ALK PHOS: x     / ALT: x     / AST: x     / GGT: x                                          10.3   12.09 )-----------( 291      ( 04 Dec 2021 07:40 )             32.9     12-04    138  |  105  |  68<H>  ----------------------------<  95  3.2<L>   |  21<L>  |  5.31<H>    Ca    7.6<L>      04 Dec 2021 07:40  Phos  5.1     12-04    TPro  x   /  Alb  2.5<L>  /  TBili  x   /  DBili  x   /  AST  x   /  ALT  x   /  AlkPhos  x   12-04        LIVER FUNCTIONS - ( 04 Dec 2021 07:40 )  Alb: 2.5 g/dL / Pro: x     / ALK PHOS: x     / ALT: x     / AST: x     / GGT: x                                       10.4   10.33 )-----------( 247      ( 03 Dec 2021 07:32 )             31.3     03 Dec 2021 07:32    135    |  101    |  46     ----------------------------<  120    3.8     |  21     |  4.07     Ca    7.6        03 Dec 2021 07:32  Phos  4.9       03 Dec 2021 07:32    TPro  x      /  Alb  2.5    /  TBili  x      /  DBili  x      /  AST  x      /  ALT  x      /  AlkPhos  x      03 Dec 2021 07:32    LIVER FUNCTIONS - ( 03 Dec 2021 07:32 )  Alb: 2.5 g/dL / Pro: x     / ALK PHOS: x     / ALT: x     / AST: x     / GGT: x           MEDICATIONS  (STANDING):  amLODIPine   Tablet 10 milliGRAM(s) Oral daily  artificial  tears Solution 1 Drop(s) Both EYES two times a day  atovaquone  Suspension 1500 milliGRAM(s) Oral daily  chlorhexidine 4% Liquid 1 Application(s) Topical <User Schedule>  cloNIDine 0.1 milliGRAM(s) Oral daily  DAPTOmycin IVPB 350 milliGRAM(s) IV Intermittent every 48 hours  epoetin amee-epbx (RETACRIT) Injectable 55058 Unit(s) IV Push <User Schedule>  gabapentin 100 milliGRAM(s) Oral three times a day  heparin   Injectable 5000 Unit(s) SubCutaneous every 12 hours  heparin   Injectable. 1000 Unit(s) Dialysis. <User Schedule>  hydroxychloroquine 200 milliGRAM(s) Oral daily  influenza   Vaccine 0.5 milliLiter(s) IntraMuscular once  pantoprazole    Tablet 40 milliGRAM(s) Oral before breakfast  predniSONE   Tablet 50 milliGRAM(s) Oral daily  vancomycin    Solution 125 milliGRAM(s) Oral every 6 hours    MEDICATIONS  (PRN):  acetaminophen     Tablet .. 650 milliGRAM(s) Oral every 6 hours PRN Temp greater or equal to 38C (100.4F), Mild Pain (1 - 3)  melatonin 3 milliGRAM(s) Oral at bedtime PRN Insomnia  ondansetron Injectable 4 milliGRAM(s) IV Push every 8 hours PRN Nausea and/or Vomiting  oxyCODONE    IR 10 milliGRAM(s) Oral every 4 hours PRN Severe Pain (7 - 10)  oxyCODONE    IR 5 milliGRAM(s) Oral every 4 hours PRN Moderate Pain (4 - 6)  sodium chloride 0.9% lock flush 10 milliLiter(s) IV Push every 1 hour PRN Pre/post blood products, medications, blood draw, and to maintain line patency      RADIOLOGY & ADDITIONAL TESTS:  EXAM:  XR CHEST PORTABLE URGENT 1V                        PROCEDURE DATE:  11/29/2021    INTERPRETATION:  AP chest on November 29, 2021 at 10:55 PM. Patient is scheduled for admission.    Heart magnified by technique.    Lung fields and pleural surfaces are unremarkable.    Advanced left shoulder degeneration again noted.    Jugular line seen on November 15 is been removed.    IMPRESSION: Clear lungs. Other findings as above.        EXAM:  ECHO TTE WO CON COMP W DOPP    PROCEDURE DATE:  11/16/2021     Summary     The mitral valve leaflets appear normal; there is no evidence of stenosis,   fluttering or prolapse.   Mild (1+) mitral regurgitation is present.   Normal aortic valve structure and function.   The tricuspid valve leaflets are thin and pliable; valve motion is normal.   Mild (1+) tricuspid valve regurgitation is present.   Normal appearing pulmonic valve structure.   Trace pulmonic valvular regurgitation is present.   The left atrium appears normal.   Estimated left ventricular ejection fraction is 50-55 %.   Mild concentric left ventricular hypertrophy is present.   Pleural effusion cannot be ruled out.

## 2021-12-08 LAB
ANION GAP SERPL CALC-SCNC: 12 MMOL/L — SIGNIFICANT CHANGE UP (ref 5–17)
BUN SERPL-MCNC: 61 MG/DL — HIGH (ref 7–23)
CALCIUM SERPL-MCNC: 7.4 MG/DL — LOW (ref 8.5–10.1)
CHLORIDE SERPL-SCNC: 103 MMOL/L — SIGNIFICANT CHANGE UP (ref 96–108)
CK SERPL-CCNC: 17 U/L — LOW (ref 26–192)
CO2 SERPL-SCNC: 21 MMOL/L — LOW (ref 22–31)
CREAT SERPL-MCNC: 4.21 MG/DL — HIGH (ref 0.5–1.3)
GLUCOSE SERPL-MCNC: 90 MG/DL — SIGNIFICANT CHANGE UP (ref 70–99)
HCT VFR BLD CALC: 31.6 % — LOW (ref 34.5–45)
HGB BLD-MCNC: 10.2 G/DL — LOW (ref 11.5–15.5)
MCHC RBC-ENTMCNC: 30.2 PG — SIGNIFICANT CHANGE UP (ref 27–34)
MCHC RBC-ENTMCNC: 32.3 GM/DL — SIGNIFICANT CHANGE UP (ref 32–36)
MCV RBC AUTO: 93.5 FL — SIGNIFICANT CHANGE UP (ref 80–100)
PLATELET # BLD AUTO: 264 K/UL — SIGNIFICANT CHANGE UP (ref 150–400)
POTASSIUM SERPL-MCNC: 4.5 MMOL/L — SIGNIFICANT CHANGE UP (ref 3.5–5.3)
POTASSIUM SERPL-SCNC: 4.5 MMOL/L — SIGNIFICANT CHANGE UP (ref 3.5–5.3)
RBC # BLD: 3.38 M/UL — LOW (ref 3.8–5.2)
RBC # FLD: 18.7 % — HIGH (ref 10.3–14.5)
SODIUM SERPL-SCNC: 136 MMOL/L — SIGNIFICANT CHANGE UP (ref 135–145)
WBC # BLD: 16 K/UL — HIGH (ref 3.8–10.5)
WBC # FLD AUTO: 16 K/UL — HIGH (ref 3.8–10.5)

## 2021-12-08 PROCEDURE — 99232 SBSQ HOSP IP/OBS MODERATE 35: CPT

## 2021-12-08 RX ORDER — ACETAMINOPHEN 500 MG
1000 TABLET ORAL ONCE
Refills: 0 | Status: DISCONTINUED | OUTPATIENT
Start: 2021-12-08 | End: 2021-12-08

## 2021-12-08 RX ORDER — ONDANSETRON 8 MG/1
4 TABLET, FILM COATED ORAL EVERY 6 HOURS
Refills: 0 | Status: DISCONTINUED | OUTPATIENT
Start: 2021-12-08 | End: 2021-12-08

## 2021-12-08 RX ORDER — FENTANYL CITRATE 50 UG/ML
50 INJECTION INTRAVENOUS
Refills: 0 | Status: DISCONTINUED | OUTPATIENT
Start: 2021-12-08 | End: 2021-12-08

## 2021-12-08 RX ORDER — SODIUM CHLORIDE 9 MG/ML
1000 INJECTION INTRAMUSCULAR; INTRAVENOUS; SUBCUTANEOUS
Refills: 0 | Status: DISCONTINUED | OUTPATIENT
Start: 2021-12-08 | End: 2021-12-08

## 2021-12-08 RX ORDER — OXYCODONE HYDROCHLORIDE 5 MG/1
5 TABLET ORAL ONCE
Refills: 0 | Status: DISCONTINUED | OUTPATIENT
Start: 2021-12-08 | End: 2021-12-08

## 2021-12-08 RX ORDER — ALBUTEROL 90 UG/1
2 AEROSOL, METERED ORAL EVERY 6 HOURS
Refills: 0 | Status: DISCONTINUED | OUTPATIENT
Start: 2021-12-08 | End: 2021-12-09

## 2021-12-08 RX ADMIN — PANTOPRAZOLE SODIUM 40 MILLIGRAM(S): 20 TABLET, DELAYED RELEASE ORAL at 06:40

## 2021-12-08 RX ADMIN — CEFTRIAXONE 1000 MILLIGRAM(S): 500 INJECTION, POWDER, FOR SOLUTION INTRAMUSCULAR; INTRAVENOUS at 16:33

## 2021-12-08 RX ADMIN — GABAPENTIN 100 MILLIGRAM(S): 400 CAPSULE ORAL at 22:16

## 2021-12-08 RX ADMIN — OXYCODONE HYDROCHLORIDE 10 MILLIGRAM(S): 5 TABLET ORAL at 09:42

## 2021-12-08 RX ADMIN — OXYCODONE HYDROCHLORIDE 10 MILLIGRAM(S): 5 TABLET ORAL at 15:40

## 2021-12-08 RX ADMIN — SODIUM CHLORIDE 50 MILLILITER(S): 9 INJECTION INTRAMUSCULAR; INTRAVENOUS; SUBCUTANEOUS at 14:52

## 2021-12-08 RX ADMIN — Medication 125 MILLIGRAM(S): at 12:00

## 2021-12-08 RX ADMIN — Medication 125 MILLIGRAM(S): at 22:17

## 2021-12-08 RX ADMIN — Medication 3 MILLIGRAM(S): at 22:16

## 2021-12-08 RX ADMIN — ATOVAQUONE 1500 MILLIGRAM(S): 750 SUSPENSION ORAL at 09:45

## 2021-12-08 RX ADMIN — OXYCODONE HYDROCHLORIDE 10 MILLIGRAM(S): 5 TABLET ORAL at 19:52

## 2021-12-08 RX ADMIN — GABAPENTIN 100 MILLIGRAM(S): 400 CAPSULE ORAL at 05:29

## 2021-12-08 RX ADMIN — HEPARIN SODIUM 5000 UNIT(S): 5000 INJECTION INTRAVENOUS; SUBCUTANEOUS at 23:17

## 2021-12-08 RX ADMIN — Medication 50 MILLIGRAM(S): at 09:44

## 2021-12-08 RX ADMIN — Medication 1 DROP(S): at 23:52

## 2021-12-08 RX ADMIN — Medication 125 MILLIGRAM(S): at 05:29

## 2021-12-08 RX ADMIN — ALBUTEROL 2 PUFF(S): 90 AEROSOL, METERED ORAL at 01:07

## 2021-12-08 RX ADMIN — Medication 1 DROP(S): at 10:20

## 2021-12-08 RX ADMIN — CHLORHEXIDINE GLUCONATE 1 APPLICATION(S): 213 SOLUTION TOPICAL at 05:29

## 2021-12-08 RX ADMIN — Medication 125 MILLIGRAM(S): at 00:13

## 2021-12-08 RX ADMIN — GABAPENTIN 100 MILLIGRAM(S): 400 CAPSULE ORAL at 16:33

## 2021-12-08 RX ADMIN — HEPARIN SODIUM 5000 UNIT(S): 5000 INJECTION INTRAVENOUS; SUBCUTANEOUS at 09:46

## 2021-12-08 RX ADMIN — OXYCODONE HYDROCHLORIDE 10 MILLIGRAM(S): 5 TABLET ORAL at 05:29

## 2021-12-08 RX ADMIN — Medication 200 MILLIGRAM(S): at 09:46

## 2021-12-08 RX ADMIN — Medication 125 MILLIGRAM(S): at 17:38

## 2021-12-08 RX ADMIN — Medication 0.1 MILLIGRAM(S): at 09:46

## 2021-12-08 RX ADMIN — AMLODIPINE BESYLATE 10 MILLIGRAM(S): 2.5 TABLET ORAL at 09:44

## 2021-12-08 NOTE — PROGRESS NOTE ADULT - SUBJECTIVE AND OBJECTIVE BOX
Date of service: 12-08-21 @ 12:07    Patient lying in bed; will have tessio placed today        ROS: no fever or chills; denies dizziness, no HA, no SOB or cough, no abdominal pain, no diarrhea or constipation; no dysuria, no urinary frequency, no legs pain, no rashes    MEDICATIONS  (STANDING):  amLODIPine   Tablet 10 milliGRAM(s) Oral daily  artificial  tears Solution 1 Drop(s) Both EYES two times a day  atovaquone  Suspension 1500 milliGRAM(s) Oral daily  cefTRIAXone Injectable. 1000 milliGRAM(s) IV Push every 24 hours  chlorhexidine 4% Liquid 1 Application(s) Topical <User Schedule>  cloNIDine 0.1 milliGRAM(s) Oral daily  DAPTOmycin IVPB 350 milliGRAM(s) IV Intermittent every 48 hours  epoetin amee-epbx (RETACRIT) Injectable 01677 Unit(s) IV Push <User Schedule>  gabapentin 100 milliGRAM(s) Oral three times a day  heparin   Injectable 5000 Unit(s) SubCutaneous every 12 hours  heparin   Injectable. 1000 Unit(s) Dialysis. <User Schedule>  hydroxychloroquine 200 milliGRAM(s) Oral daily  influenza   Vaccine 0.5 milliLiter(s) IntraMuscular once  pantoprazole    Tablet 40 milliGRAM(s) Oral before breakfast  predniSONE   Tablet 50 milliGRAM(s) Oral daily  vancomycin    Solution 125 milliGRAM(s) Oral every 6 hours    MEDICATIONS  (PRN):  acetaminophen     Tablet .. 650 milliGRAM(s) Oral every 6 hours PRN Temp greater or equal to 38C (100.4F), Mild Pain (1 - 3)  ALBUTerol    90 MICROgram(s) HFA Inhaler 2 Puff(s) Inhalation every 6 hours PRN Shortness of Breath and/or Wheezing  melatonin 3 milliGRAM(s) Oral at bedtime PRN Insomnia  ondansetron Injectable 4 milliGRAM(s) IV Push every 8 hours PRN Nausea and/or Vomiting  oxyCODONE    IR 10 milliGRAM(s) Oral every 4 hours PRN Severe Pain (7 - 10)  oxyCODONE    IR 5 milliGRAM(s) Oral every 4 hours PRN Moderate Pain (4 - 6)  simethicone 80 milliGRAM(s) Chew three times a day PRN Gas  sodium chloride 0.9% lock flush 10 milliLiter(s) IV Push every 1 hour PRN Pre/post blood products, medications, blood draw, and to maintain line patency      Vital Signs Last 24 Hrs  T(C): 36.6 (08 Dec 2021 08:46), Max: 37.1 (07 Dec 2021 17:33)  T(F): 97.9 (08 Dec 2021 08:46), Max: 98.7 (07 Dec 2021 17:33)  HR: 75 (08 Dec 2021 08:46) (75 - 82)  BP: 135/99 (08 Dec 2021 08:46) (130/95 - 152/90)  BP(mean): --  RR: 18 (08 Dec 2021 08:46) (18 - 18)  SpO2: 99% (08 Dec 2021 08:46) (99% - 100%)        Physical Exam:      Constitutional: frail looking  HEENT: NC/AT, EOMI, PERRLA, conjunctivae clear; ears and nose atraumatic; pharynx clear; poor dentition  Neck: supple; thyroid not palpable  Back: no tenderness  Respiratory: respiratory effort normal; clear to auscultation  Cardiovascular: S1S2 regular, no murmurs  Abdomen: soft, not tender, not distended, positive BS; no liver or spleen organomegaly  Genitourinary: no suprapubic tenderness  Musculoskeletal: no muscle tenderness, no joint swelling or tenderness  Neurological/ Psychiatric:   moving all extremities  Skin: no rashes; no palpable lesions; too numerous to count track marks    Labs: all available labs reviewed                          Labs:                        10.2   16.00 )-----------( 264      ( 08 Dec 2021 08:13 )             31.6     12-08    136  |  103  |  61<H>  ----------------------------<  90  4.5   |  21<L>  |  4.21<H>    Ca    7.4<L>      08 Dec 2021 08:13  Phos  5.1     12-07    TPro  x   /  Alb  2.8<L>  /  TBili  x   /  DBili  x   /  AST  x   /  ALT  x   /  AlkPhos  x   12-07           Cultures:       GI PCR Panel, Stool (collected 12-02-21 @ 15:33)  Source: .Stool Feces  Final Report (12-07-21 @ 19:43):    Yersinia enterocolitica not isolated by culture.    .    Yersinia enterocolitica GI PCR Results: INDETERMINATE    *******Please Note:*******    An indeterminate result may be due to a false    positive PCR test, the presence    of amplification inhibitors in the sample or levels of    target DNA present below the limit of detection.    This result does not preclude the possibility of infection.    Please submit an additional sample for repeat testing.    GI panel PCR evaluates for:    Campylobacter, Plesiomonas shigelloides, Salmonella,    Vibrio, Yersinia enterocolitica, Enteroaggregative    Escherichia coli (EAEC), Enteropathogenic E.coli (EPEC),    Enterotoxigenic E. coli (ETEC) lt/st, Shiga-like    toxin-producing E. coli (STEC) stx1/stx2,    Shigella/ Enteroinvasive E. coli (EIEC), Cryptosporidium,    Cyclospora cayetanensis, Entamoeba histolytica,    Giardia lamblia, Adenovirus F 40/41, Astrovirus,    Norovirus GI/GII, Rotavirus A, Sapovirus    .    Enteropathogenic E. coli (EPEC)    DETECTED by PCR    *******Please Note:*******    GI panel PCR evaluates for:    Campylobacter, Plesiomonas shigelloides, Salmonella,    Vibrio, Yersinia enterocolitica, Enteroaggregative    Escherichia coli (EAEC), Enteropathogenic E.coli (EPEC),    Enterotoxigenic E. coli (ETEC) lt/st, Shiga-like    toxin-producing E. coli (STEC) stx1/stx2,    Shigella/ Enteroinvasive E. coli (EIEC), Cryptosporidium,    Cyclospora cayetanensis, Entamoeba histolytica,    Giardia lamblia, Adenovirus F 40/41, Astrovirus,    Norovirus GI/GII, Rotavirus A, Sapovirus                Labs:                    Clostridium difficile Toxin by PCR (12.03.21 @ 13:30)    Clostridium difficile Toxin by PCR: The results of this test should be interpreted with consideration of all  clinical and laboratory findings. This test determines the presence of  the C. difficile tcdB gene at a given time and is not intended to  identify antibiotic associated disease or C. difficile infection without  clinical context.  Successful treatment is based on the resolution of clinical symptoms.  This test should not be used as a "test of cure" because C. difficile DNA  will persist after successful treatment. Repeat testing will not be  permitted.    This Real-Time PCR assay is FDA-approved, and its performance has been  established by [facility], [place], [place].    C Diff by PCR Result: Detected    Source: .Blood None  Final Report (12-06-21 @ 13:00):    No Growth Final    Culture - Blood (collected 12-01-21 @ 08:48)  Source: .Blood None  Final Report (12-06-21 @ 13:00):    No Growth Final              Radiology: all available radiological tests reviewed    Advanced directives addressed: full resuscitation

## 2021-12-08 NOTE — PROGRESS NOTE ADULT - ASSESSMENT
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 SLE   suspected 2/2 IVDA  C/w Prednisone 50mg   C/w Plaquenil   F/u with Rheum outPt     4) MRSA bacteremia/IVDA  Last GRAHAM neg for vegetations   RIJ placed this admission  Most recent Bcx negative  ID eval appreciated, cleared for Tesio from MRSA stand point   IR to place Tesio today       5) Infectious  Diarrhea + CDiff, Yersinia and ECOLI  contact isolation   CDIFF PCR +   GI PCR noted  C/w   Vanco Po day 4  C/w  simethicone PRN  Started on ceftriaxone x 3 days, will complete tomorrow   D/w Dr Harrington       5) HTN: C/w Norvasc and Clonidine     6) Chronic pain.   On gabapentin and percocet, will change to Oxy ( not to mask fever)   Adust pain meds as needed   7)  DVT PPX: venodynes , heparin s/q    #ACP: FULL CODE.     #Dispo:  Keep NPO a for Tessio cath placement,. Pt is cleared by ID for procedure     D/w IR team

## 2021-12-08 NOTE — PROGRESS NOTE ADULT - ASSESSMENT
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 Patient is poor historian.     1. Patient admitted for continuation of her treatment for MRSA sepsis; dialysis patient  - follow up cultures   - serial cbc and monitor temperature   - reviewed prior medical records to evaluate for resistant or atypical pathogens   - will start daptomycin 350 mg iv q 48 hours, day #8  - check cpk in am  - had echocardiogram on the recent admission  - temporary shiley at this time  - started on po vancomycin for CDiff  - stool studies noted, given increasing wbc, will give ceftriaxone one gram daily, for 3 days; unclear why patient has these organisms in her stool studies as well as cdiff, day #2  - contact isolation   - hopefully will get tessio on 12/8; given improvement in wbc, okay from id standpoint to place tessio today  - hopefully will be able to discharge home in am, and will speak to nephrology to arrange for vancomycin 500 mg iv post each dialysis until 12/20 for the treatment of the MRSA sepsis  2. other issues; per medicine

## 2021-12-08 NOTE — PROGRESS NOTE ADULT - SUBJECTIVE AND OBJECTIVE BOX
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 Found to have K of 6.1. She denies any cp/n/v. c/o some SOB and leg edema.     INTERVAL HPI/ OVERNIGHT EVENTS: Pt was seen and examined, reports no fevers or chills, no abd cramps, had small semiformed stool yesterday. POC discussed with PT.     Vital Signs Last 24 Hrs  T(C): 36.6 (08 Dec 2021 08:46), Max: 37.1 (07 Dec 2021 17:33)  T(F): 97.9 (08 Dec 2021 08:46), Max: 98.7 (07 Dec 2021 17:33)  HR: 75 (08 Dec 2021 08:46) (75 - 82)  BP: 135/99 (08 Dec 2021 08:46) (130/95 - 152/90)  RR: 18 (08 Dec 2021 08:46) (17 - 18)  SpO2: 99% (08 Dec 2021 08:46) (99% - 100%)      REVIEW OF SYSTEMS:  All other review of systems is negative unless indicated above.      PHYSICAL EXAM:  General: Well developed; looks chronically ill,  in no acute distress  Eyes:  EOMI; conjunctiva and sclera clear  Head: Normocephalic; atraumatic  ENMT: No nasal discharge; airway clear  Neck: Supple; non tender; no masses  Respiratory: No wheezes, rales or rhonchi  Cardiovascular: Regular rate and rhythm. S1 and S2 Normal;  Gastrointestinal: Soft, non-tender non-distended; Normal bowel sounds  Genitourinary: No  suprapubic  tenderness  Extremities: + edema  Vascular: Peripheral pulses palpable 2+ bilaterally  Neurological: Alert and oriented x3, non focal   Skin: Warm and dry.   Musculoskeletal: Normal muscle tone  Psychiatric: Cooperative      LABS:                             10.2   16.00 )-----------( 264      ( 08 Dec 2021 08:13 )             31.6     12-08    136  |  103  |  61<H>  ----------------------------<  90  4.5   |  21<L>  |  4.21<H>    Ca    7.4<L>      08 Dec 2021 08:13  Phos  5.1     12-07    TPro  x   /  Alb  2.8<L>  /  TBili  x   /  DBili  x   /  AST  x   /  ALT  x   /  AlkPhos  x   12-07    CARDIAC MARKERS ( 08 Dec 2021 08:13 )  x     / x     / 17 U/L / x     / x        LIVER FUNCTIONS - ( 07 Dec 2021 07:55 )  Alb: 2.8 g/dL / Pro: x     / ALK PHOS: x     / ALT: x     / AST: x     / GGT: x                                     9.9    18.45 )-----------( 280      ( 07 Dec 2021 07:55 )             31.4     12-07    135  |  101  |  99<H>  ----------------------------<  82  3.3<L>   |  20<L>  |  6.10<H>    Ca    7.3<L>      07 Dec 2021 07:55  Phos  5.1     12-07  Mg     1.5     12-06    TPro  x   /  Alb  2.8<L>  /  TBili  x   /  DBili  x   /  AST  x   /  ALT  x   /  AlkPhos  x   12-07    LIVER FUNCTIONS - ( 07 Dec 2021 07:55 )  Alb: 2.8 g/dL / Pro: x     / ALK PHOS: x     / ALT: x     / AST: x     / GGT: x                                   11.0   15.91 )-----------( 353      ( 06 Dec 2021 08:49 )             34.1     12-06    134<L>  |  102  |  79<H>  ----------------------------<  78  3.5   |  19<L>  |  5.28<H>    Ca    7.7<L>      06 Dec 2021 08:49  Phos  4.9     12-06  Mg     1.5     12-06                          10.5   11.21 )-----------( 273      ( 05 Dec 2021 07:57 )             33.4     12-05    135  |  102  |  49<H>  ----------------------------<  85  3.9   |  24  |  4.10<H>    Ca    7.6<L>      05 Dec 2021 07:57  Phos  5.1     12-04  Mg     1.7     12-05    TPro  x   /  Alb  2.5<L>  /  TBili  x   /  DBili  x   /  AST  x   /  ALT  x   /  AlkPhos  x   12-04        LIVER FUNCTIONS - ( 04 Dec 2021 07:40 )  Alb: 2.5 g/dL / Pro: x     / ALK PHOS: x     / ALT: x     / AST: x     / GGT: x                                          10.3   12.09 )-----------( 291      ( 04 Dec 2021 07:40 )             32.9     12-04    138  |  105  |  68<H>  ----------------------------<  95  3.2<L>   |  21<L>  |  5.31<H>    Ca    7.6<L>      04 Dec 2021 07:40  Phos  5.1     12-04    TPro  x   /  Alb  2.5<L>  /  TBili  x   /  DBili  x   /  AST  x   /  ALT  x   /  AlkPhos  x   12-04        LIVER FUNCTIONS - ( 04 Dec 2021 07:40 )  Alb: 2.5 g/dL / Pro: x     / ALK PHOS: x     / ALT: x     / AST: x     / GGT: x                                       10.4   10.33 )-----------( 247      ( 03 Dec 2021 07:32 )             31.3     03 Dec 2021 07:32    135    |  101    |  46     ----------------------------<  120    3.8     |  21     |  4.07     Ca    7.6        03 Dec 2021 07:32  Phos  4.9       03 Dec 2021 07:32    TPro  x      /  Alb  2.5    /  TBili  x      /  DBili  x      /  AST  x      /  ALT  x      /  AlkPhos  x      03 Dec 2021 07:32    LIVER FUNCTIONS - ( 03 Dec 2021 07:32 )  Alb: 2.5 g/dL / Pro: x     / ALK PHOS: x     / ALT: x     / AST: x     / GGT: x           MEDICATIONS  (STANDING):  amLODIPine   Tablet 10 milliGRAM(s) Oral daily  artificial  tears Solution 1 Drop(s) Both EYES two times a day  atovaquone  Suspension 1500 milliGRAM(s) Oral daily  cefTRIAXone Injectable. 1000 milliGRAM(s) IV Push every 24 hours  chlorhexidine 4% Liquid 1 Application(s) Topical <User Schedule>  cloNIDine 0.1 milliGRAM(s) Oral daily  DAPTOmycin IVPB 350 milliGRAM(s) IV Intermittent every 48 hours  epoetin amee-epbx (RETACRIT) Injectable 13596 Unit(s) IV Push <User Schedule>  gabapentin 100 milliGRAM(s) Oral three times a day  heparin   Injectable 5000 Unit(s) SubCutaneous every 12 hours  heparin   Injectable. 1000 Unit(s) Dialysis. <User Schedule>  hydroxychloroquine 200 milliGRAM(s) Oral daily  influenza   Vaccine 0.5 milliLiter(s) IntraMuscular once  pantoprazole    Tablet 40 milliGRAM(s) Oral before breakfast  predniSONE   Tablet 50 milliGRAM(s) Oral daily  vancomycin    Solution 125 milliGRAM(s) Oral every 6 hours    MEDICATIONS  (PRN):  acetaminophen     Tablet .. 650 milliGRAM(s) Oral every 6 hours PRN Temp greater or equal to 38C (100.4F), Mild Pain (1 - 3)  ALBUTerol    90 MICROgram(s) HFA Inhaler 2 Puff(s) Inhalation every 6 hours PRN Shortness of Breath and/or Wheezing  melatonin 3 milliGRAM(s) Oral at bedtime PRN Insomnia  ondansetron Injectable 4 milliGRAM(s) IV Push every 8 hours PRN Nausea and/or Vomiting  oxyCODONE    IR 10 milliGRAM(s) Oral every 4 hours PRN Severe Pain (7 - 10)  oxyCODONE    IR 5 milliGRAM(s) Oral every 4 hours PRN Moderate Pain (4 - 6)  simethicone 80 milliGRAM(s) Chew three times a day PRN Gas  sodium chloride 0.9% lock flush 10 milliLiter(s) IV Push every 1 hour PRN Pre/post blood products, medications, blood draw, and to maintain line patency      RADIOLOGY & ADDITIONAL TESTS:  EXAM:  XR CHEST PORTABLE URGENT 1V                        PROCEDURE DATE:  11/29/2021    INTERPRETATION:  AP chest on November 29, 2021 at 10:55 PM. Patient is scheduled for admission.    Heart magnified by technique.    Lung fields and pleural surfaces are unremarkable.    Advanced left shoulder degeneration again noted.    Jugular line seen on November 15 is been removed.    IMPRESSION: Clear lungs. Other findings as above.        EXAM:  ECHO TTE WO CON COMP W DOPP    PROCEDURE DATE:  11/16/2021     Summary     The mitral valve leaflets appear normal; there is no evidence of stenosis,   fluttering or prolapse.   Mild (1+) mitral regurgitation is present.   Normal aortic valve structure and function.   The tricuspid valve leaflets are thin and pliable; valve motion is normal.   Mild (1+) tricuspid valve regurgitation is present.   Normal appearing pulmonic valve structure.   Trace pulmonic valvular regurgitation is present.   The left atrium appears normal.   Estimated left ventricular ejection fraction is 50-55 %.   Mild concentric left ventricular hypertrophy is present.   Pleural effusion cannot be ruled out.

## 2021-12-09 ENCOUNTER — TRANSCRIPTION ENCOUNTER (OUTPATIENT)
Age: 41
End: 2021-12-09

## 2021-12-09 VITALS — HEART RATE: 111 BPM | SYSTOLIC BLOOD PRESSURE: 153 MMHG | DIASTOLIC BLOOD PRESSURE: 98 MMHG

## 2021-12-09 LAB
ALBUMIN SERPL ELPH-MCNC: 2.8 G/DL — LOW (ref 3.3–5)
ANION GAP SERPL CALC-SCNC: 13 MMOL/L — SIGNIFICANT CHANGE UP (ref 5–17)
BUN SERPL-MCNC: 88 MG/DL — HIGH (ref 7–23)
CALCIUM SERPL-MCNC: 7.6 MG/DL — LOW (ref 8.5–10.1)
CHLORIDE SERPL-SCNC: 103 MMOL/L — SIGNIFICANT CHANGE UP (ref 96–108)
CO2 SERPL-SCNC: 20 MMOL/L — LOW (ref 22–31)
CREAT SERPL-MCNC: 5.76 MG/DL — HIGH (ref 0.5–1.3)
GLUCOSE SERPL-MCNC: 98 MG/DL — SIGNIFICANT CHANGE UP (ref 70–99)
HCT VFR BLD CALC: 32 % — LOW (ref 34.5–45)
HGB BLD-MCNC: 10.1 G/DL — LOW (ref 11.5–15.5)
MCHC RBC-ENTMCNC: 30.1 PG — SIGNIFICANT CHANGE UP (ref 27–34)
MCHC RBC-ENTMCNC: 31.6 GM/DL — LOW (ref 32–36)
MCV RBC AUTO: 95.5 FL — SIGNIFICANT CHANGE UP (ref 80–100)
PHOSPHATE SERPL-MCNC: 6.2 MG/DL — HIGH (ref 2.5–4.5)
PLATELET # BLD AUTO: 236 K/UL — SIGNIFICANT CHANGE UP (ref 150–400)
POTASSIUM SERPL-MCNC: 3.6 MMOL/L — SIGNIFICANT CHANGE UP (ref 3.5–5.3)
POTASSIUM SERPL-SCNC: 3.6 MMOL/L — SIGNIFICANT CHANGE UP (ref 3.5–5.3)
RBC # BLD: 3.35 M/UL — LOW (ref 3.8–5.2)
RBC # FLD: 19.3 % — HIGH (ref 10.3–14.5)
SODIUM SERPL-SCNC: 136 MMOL/L — SIGNIFICANT CHANGE UP (ref 135–145)
WBC # BLD: 15.96 K/UL — HIGH (ref 3.8–10.5)
WBC # FLD AUTO: 15.96 K/UL — HIGH (ref 3.8–10.5)

## 2021-12-09 PROCEDURE — 99239 HOSP IP/OBS DSCHRG MGMT >30: CPT

## 2021-12-09 RX ORDER — LANOLIN ALCOHOL/MO/W.PET/CERES
1 CREAM (GRAM) TOPICAL
Qty: 15 | Refills: 0
Start: 2021-12-09

## 2021-12-09 RX ORDER — GABAPENTIN 400 MG/1
1 CAPSULE ORAL
Qty: 90 | Refills: 0
Start: 2021-12-09

## 2021-12-09 RX ORDER — VANCOMYCIN HCL 1 G
1 VIAL (EA) INTRAVENOUS
Qty: 36 | Refills: 0
Start: 2021-12-09 | End: 2021-12-17

## 2021-12-09 RX ORDER — PANTOPRAZOLE SODIUM 20 MG/1
1 TABLET, DELAYED RELEASE ORAL
Qty: 30 | Refills: 0
Start: 2021-12-09

## 2021-12-09 RX ORDER — DOCUSATE SODIUM 100 MG
1 CAPSULE ORAL
Qty: 60 | Refills: 0
Start: 2021-12-09 | End: 2022-01-07

## 2021-12-09 RX ORDER — AMLODIPINE BESYLATE 2.5 MG/1
1 TABLET ORAL
Qty: 30 | Refills: 3
Start: 2021-12-09

## 2021-12-09 RX ORDER — ALBUTEROL 90 UG/1
2 AEROSOL, METERED ORAL
Qty: 0 | Refills: 0 | DISCHARGE
Start: 2021-12-09

## 2021-12-09 RX ORDER — HYDROXYCHLOROQUINE SULFATE 200 MG
1 TABLET ORAL
Qty: 30 | Refills: 3
Start: 2021-12-09

## 2021-12-09 RX ORDER — SIMETHICONE 80 MG/1
1 TABLET, CHEWABLE ORAL
Qty: 90 | Refills: 0
Start: 2021-12-09 | End: 2022-01-07

## 2021-12-09 RX ORDER — ALBUTEROL 90 UG/1
2 AEROSOL, METERED ORAL
Qty: 1 | Refills: 0
Start: 2021-12-09

## 2021-12-09 RX ORDER — SIMETHICONE 80 MG/1
1 TABLET, CHEWABLE ORAL
Qty: 0 | Refills: 0 | DISCHARGE
Start: 2021-12-09

## 2021-12-09 RX ORDER — ATOVAQUONE 750 MG/5ML
10 SUSPENSION ORAL
Qty: 300 | Refills: 3
Start: 2021-12-09

## 2021-12-09 RX ADMIN — CHLORHEXIDINE GLUCONATE 1 APPLICATION(S): 213 SOLUTION TOPICAL at 04:35

## 2021-12-09 RX ADMIN — DAPTOMYCIN 114 MILLIGRAM(S): 500 INJECTION, POWDER, LYOPHILIZED, FOR SOLUTION INTRAVENOUS at 13:33

## 2021-12-09 RX ADMIN — Medication 0.1 MILLIGRAM(S): at 13:30

## 2021-12-09 RX ADMIN — AMLODIPINE BESYLATE 10 MILLIGRAM(S): 2.5 TABLET ORAL at 13:31

## 2021-12-09 RX ADMIN — CEFTRIAXONE 1000 MILLIGRAM(S): 500 INJECTION, POWDER, FOR SOLUTION INTRAMUSCULAR; INTRAVENOUS at 13:33

## 2021-12-09 RX ADMIN — Medication 125 MILLIGRAM(S): at 04:35

## 2021-12-09 RX ADMIN — OXYCODONE HYDROCHLORIDE 10 MILLIGRAM(S): 5 TABLET ORAL at 00:14

## 2021-12-09 RX ADMIN — Medication 125 MILLIGRAM(S): at 13:32

## 2021-12-09 RX ADMIN — OXYCODONE HYDROCHLORIDE 10 MILLIGRAM(S): 5 TABLET ORAL at 04:34

## 2021-12-09 RX ADMIN — ERYTHROPOIETIN 10000 UNIT(S): 10000 INJECTION, SOLUTION INTRAVENOUS; SUBCUTANEOUS at 11:44

## 2021-12-09 RX ADMIN — ATOVAQUONE 1500 MILLIGRAM(S): 750 SUSPENSION ORAL at 13:30

## 2021-12-09 RX ADMIN — GABAPENTIN 100 MILLIGRAM(S): 400 CAPSULE ORAL at 04:35

## 2021-12-09 RX ADMIN — HEPARIN SODIUM 1000 UNIT(S): 5000 INJECTION INTRAVENOUS; SUBCUTANEOUS at 08:40

## 2021-12-09 RX ADMIN — GABAPENTIN 100 MILLIGRAM(S): 400 CAPSULE ORAL at 13:30

## 2021-12-09 RX ADMIN — HEPARIN SODIUM 5000 UNIT(S): 5000 INJECTION INTRAVENOUS; SUBCUTANEOUS at 13:31

## 2021-12-09 RX ADMIN — PANTOPRAZOLE SODIUM 40 MILLIGRAM(S): 20 TABLET, DELAYED RELEASE ORAL at 04:34

## 2021-12-09 RX ADMIN — OXYCODONE HYDROCHLORIDE 10 MILLIGRAM(S): 5 TABLET ORAL at 09:03

## 2021-12-09 RX ADMIN — Medication 50 MILLIGRAM(S): at 13:32

## 2021-12-09 RX ADMIN — Medication 200 MILLIGRAM(S): at 13:31

## 2021-12-09 RX ADMIN — OXYCODONE HYDROCHLORIDE 10 MILLIGRAM(S): 5 TABLET ORAL at 09:30

## 2021-12-09 RX ADMIN — Medication 1 DROP(S): at 13:33

## 2021-12-09 RX ADMIN — OXYCODONE HYDROCHLORIDE 10 MILLIGRAM(S): 5 TABLET ORAL at 13:30

## 2021-12-09 NOTE — PROGRESS NOTE ADULT - REASON FOR ADMISSION
for HD, Hyperkalemia

## 2021-12-09 NOTE — PROGRESS NOTE ADULT - ASSESSMENT
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 Patient is poor historian.     1. Patient admitted for continuation of her treatment for MRSA sepsis; dialysis patient  - follow up cultures   - serial cbc and monitor temperature   - reviewed prior medical records to evaluate for resistant or atypical pathogens   - okay from id standpoint to discharge home on vancomycin 500 mg iv post dialysis sessions three times a week until 12/20; nephrology aware and will order for the outpatient dialysis center  - can complete total 14 days po vancomycin for CDiff  - encouraged abstinence from illicit substance; she is in good spirits and looks forward to getting better  2. other issues; per medicine

## 2021-12-09 NOTE — DISCHARGE NOTE NURSING/CASE MANAGEMENT/SOCIAL WORK - NSDCPEFALRISK_GEN_ALL_CORE
For information on Fall & Injury Prevention, visit: [email or web address] OR  [email or web address] OR  https://www.cdc.gov/steadi/patient.html

## 2021-12-09 NOTE — PROGRESS NOTE ADULT - SUBJECTIVE AND OBJECTIVE BOX
Date of service: 12-09-21 @ 13:58      Patient feeling well, had tessio placed, we discussed her goals of care; she looks forward to getting better, hopefully she will not be a permanent dialysis patient and will avoid any illicit substances      ROS: no fever or chills; denies dizziness, no HA, no SOB or cough, no abdominal pain, no diarrhea or constipation; no dysuria, no urinary frequency, no legs pain, no rashes    MEDICATIONS  (STANDING):  amLODIPine   Tablet 10 milliGRAM(s) Oral daily  artificial  tears Solution 1 Drop(s) Both EYES two times a day  atovaquone  Suspension 1500 milliGRAM(s) Oral daily  chlorhexidine 4% Liquid 1 Application(s) Topical <User Schedule>  cloNIDine 0.1 milliGRAM(s) Oral daily  DAPTOmycin IVPB 350 milliGRAM(s) IV Intermittent <User Schedule>  epoetin amee-epbx (RETACRIT) Injectable 40396 Unit(s) IV Push <User Schedule>  gabapentin 100 milliGRAM(s) Oral three times a day  heparin   Injectable 5000 Unit(s) SubCutaneous every 12 hours  heparin   Injectable. 1000 Unit(s) Dialysis. <User Schedule>  hydroxychloroquine 200 milliGRAM(s) Oral daily  influenza   Vaccine 0.5 milliLiter(s) IntraMuscular once  pantoprazole    Tablet 40 milliGRAM(s) Oral before breakfast  predniSONE   Tablet 50 milliGRAM(s) Oral daily  vancomycin    Solution 125 milliGRAM(s) Oral every 6 hours    MEDICATIONS  (PRN):  acetaminophen     Tablet .. 650 milliGRAM(s) Oral every 6 hours PRN Temp greater or equal to 38C (100.4F), Mild Pain (1 - 3)  ALBUTerol    90 MICROgram(s) HFA Inhaler 2 Puff(s) Inhalation every 6 hours PRN Shortness of Breath and/or Wheezing  melatonin 3 milliGRAM(s) Oral at bedtime PRN Insomnia  ondansetron Injectable 4 milliGRAM(s) IV Push every 8 hours PRN Nausea and/or Vomiting  oxyCODONE    IR 10 milliGRAM(s) Oral every 4 hours PRN Severe Pain (7 - 10)  oxyCODONE    IR 5 milliGRAM(s) Oral every 4 hours PRN Moderate Pain (4 - 6)  simethicone 80 milliGRAM(s) Chew three times a day PRN Gas  sodium chloride 0.9% lock flush 10 milliLiter(s) IV Push every 1 hour PRN Pre/post blood products, medications, blood draw, and to maintain line patency      Vital Signs Last 24 Hrs  T(C): 36.7 (09 Dec 2021 12:30), Max: 36.9 (09 Dec 2021 08:35)  T(F): 98 (09 Dec 2021 12:30), Max: 98.4 (09 Dec 2021 08:35)  HR: 102 (09 Dec 2021 13:24) (67 - 102)  BP: 164/115 (09 Dec 2021 13:24) (104/73 - 164/115)  BP(mean): 131 (09 Dec 2021 13:24) (131 - 131)  RR: 18 (09 Dec 2021 13:24) (10 - 20)  SpO2: 100% (09 Dec 2021 13:24) (97% - 100%)        Physical Exam:      Constitutional: frail looking  HEENT: NC/AT, EOMI, PERRLA, conjunctivae clear; ears and nose atraumatic; pharynx clear; poor dentition  Neck: supple; thyroid not palpable  Back: no tenderness  Respiratory: respiratory effort normal; clear to auscultation  Cardiovascular: S1S2 regular, no murmurs  Abdomen: soft, not tender, not distended, positive BS; no liver or spleen organomegaly  Genitourinary: no suprapubic tenderness  Musculoskeletal: no muscle tenderness, no joint swelling or tenderness  Neurological/ Psychiatric:   moving all extremities  Skin: no rashes; no palpable lesions; too numerous to count track marks  right upper chest tessio in place    Labs: all available labs reviewed                          Labs:                      Labs:                        10.1   15.96 )-----------( 236      ( 09 Dec 2021 08:44 )             32.0     12-09    136  |  103  |  88<H>  ----------------------------<  98  3.6   |  20<L>  |  5.76<H>    Ca    7.6<L>      09 Dec 2021 08:44  Phos  6.2     12-09    TPro  x   /  Alb  2.8<L>  /  TBili  x   /  DBili  x   /  AST  x   /  ALT  x   /  AlkPhos  x   12-09           Cultures:       GI PCR Panel, Stool (collected 12-02-21 @ 15:33)  Source: .Stool Feces  Final Report (12-07-21 @ 19:43):    Yersinia enterocolitica not isolated by culture.    .    Yersinia enterocolitica GI PCR Results: INDETERMINATE    *******Please Note:*******    An indeterminate result may be due to a false    positive PCR test, the presence    of amplification inhibitors in the sample or levels of    target DNA present below the limit of detection.    This result does not preclude the possibility of infection.    Please submit an additional sample for repeat testing.    GI panel PCR evaluates for:    Campylobacter, Plesiomonas shigelloides, Salmonella,    Vibrio, Yersinia enterocolitica, Enteroaggregative    Escherichia coli (EAEC), Enteropathogenic E.coli (EPEC),    Enterotoxigenic E. coli (ETEC) lt/st, Shiga-like    toxin-producing E. coli (STEC) stx1/stx2,    Shigella/ Enteroinvasive E. coli (EIEC), Cryptosporidium,    Cyclospora cayetanensis, Entamoeba histolytica,    Giardia lamblia, Adenovirus F 40/41, Astrovirus,    Norovirus GI/GII, Rotavirus A, Sapovirus    .    Enteropathogenic E. coli (EPEC)    DETECTED by PCR    *******Please Note:*******    GI panel PCR evaluates for:    Campylobacter, Plesiomonas shigelloides, Salmonella,    Vibrio, Yersinia enterocolitica, Enteroaggregative    Escherichia coli (EAEC), Enteropathogenic E.coli (EPEC),    Enterotoxigenic E. coli (ETEC) lt/st, Shiga-like    toxin-producing E. coli (STEC) stx1/stx2,    Shigella/ Enteroinvasive E. coli (EIEC), Cryptosporidium,    Cyclospora cayetanensis, Entamoeba histolytica,    Giardia lamblia, Adenovirus F 40/41, Astrovirus,    Norovirus GI/GII, Rotavirus A, Sapovirus                  Culture - Blood (collected 12-01-21 @ 08:48)  Clostridium difficile Toxin by PCR (12.03.21 @ 13:30)    Clostridium difficile Toxin by PCR: The results of this test should be interpreted with consideration of all  clinical and laboratory findings. This test determines the presence of  the C. difficile tcdB gene at a given time and is not intended to  identify antibiotic associated disease or C. difficile infection without  clinical context.  Successful treatment is based on the resolution of clinical symptoms.  This test should not be used as a "test of cure" because C. difficile DNA  will persist after successful treatment. Repeat testing will not be  permitted.    This Real-Time PCR assay is FDA-approved, and its performance has been  established by [facility], [place], [place].    C Diff by PCR Result: Detected    Source: .Blood None  Final Report (12-06-21 @ 13:00):    No Growth Final    Culture - Blood (collected 12-01-21 @ 08:48)  Source: .Blood None  Final Report (12-06-21 @ 13:00):    No Growth Final              Radiology: all available radiological tests reviewed    Advanced directives addressed: full resuscitation

## 2021-12-09 NOTE — PROGRESS NOTE ADULT - ASSESSMENT
41/ F with PMHx of  bacteremia,  kidney bx proven ANCA vasculitis presumed sec to cocaine/ heroin abuse ( Cocaine + on drug tox) COPD, depression, epilepsy, GERD, Raynaud's syndrome, heroin abuse, HTN, lupus, RA, sepsis, SLE, smoker  with esrd on hd tts      NATALIA on HD TTS   no renal recovery   - tolerating hd, uf goal of 2.5 kg     k adjusted bath     may not recover - could be ESRD - monitor and plan for AVF once acute infection cleared     anemia on epo    fu trend of h/h    HTN     dose meds      fu trend      MRSA bacteremia     per ID  - vanco 500 mg IV w HD till 12/20      Cdiff / yersinia    vanco po     rocephin     d/w HD RN   d.w Dr Harrington

## 2021-12-09 NOTE — DISCHARGE NOTE PROVIDER - CARE PROVIDER_API CALL
Blake Pena)  Internal Medicine; Nephrology  [address] [place] [address]  Phone: [phone]  Fax: [phone]  Follow Up Time:     PORFIRIO CRISTOBAL  Internal Medicine  [address] [place] [address]  Phone: [phone]  Fax: [phone]  Follow Up Time:

## 2021-12-09 NOTE — DISCHARGE NOTE PROVIDER - NSDCMRMEDTOKEN_GEN_ALL_CORE_FT
albuterol 90 mcg/inh inhalation aerosol: 2 puff(s) inhaled every 6 hours, As needed, Shortness of Breath and/or Wheezing  amLODIPine 10 mg oral tablet: 1 tab(s) orally once a day  atovaquone 750 mg/5 mL oral suspension: 10 milliliter(s) orally once a day  cloNIDine 0.1 mg oral tablet: 1 tab(s) orally once a day  docusate sodium 100 mg oral capsule: 1 cap(s) orally 2 times a day, As Needed  gabapentin 100 mg oral capsule: 1 cap(s) orally 3 times a day MDD:Maximum daily dose 300  hydroxychloroquine 200 mg oral tablet: 1 tab(s) orally once a day MDD:200 daily  loperamide 2 mg oral capsule: 1 cap(s) orally 3 times a day, As needed, Diarrhea  melatonin 3 mg oral tablet: 1 tab(s) orally once a day (at bedtime), As needed, Insomnia  ocular lubricant ophthalmic solution: 1 drop(s) to each affected eye 2 times a day as needed for dry eyes  oxycodone-acetaminophen 5 mg-325 mg oral tablet: 2 tab(s) orally every 6 hours, As needed, Severe Pain (7 - 10) MDD:maximum daily dose: oxycodone 40mg; acetaminophen 2600mg  pantoprazole 40 mg oral delayed release tablet: 1 tab(s) orally once a day (before a meal)  predniSONE 50 mg oral tablet: 1 tab(s) orally once a day   simethicone 80 mg oral tablet, chewable: 1 tab(s) orally 3 times a day, As needed, Gas   albuterol 90 mcg/inh inhalation aerosol: 2 puff(s) inhaled every 6 hours, As needed, Shortness of Breath and/or Wheezing  amLODIPine 10 mg oral tablet: 1 tab(s) orally once a day  atovaquone 750 mg/5 mL oral suspension: 10 milliliter(s) orally once a day  cloNIDine 0.1 mg oral tablet: 1 tab(s) orally once a day  docusate sodium 100 mg oral capsule: 1 cap(s) orally 2 times a day, As Needed  gabapentin 100 mg oral capsule: 1 cap(s) orally 3 times a day MDD:Maximum daily dose 300  hydroxychloroquine 200 mg oral tablet: 1 tab(s) orally once a day MDD:200 daily  melatonin 3 mg oral tablet: 1 tab(s) orally once a day (at bedtime), As needed, Insomnia  ocular lubricant ophthalmic solution: 1 drop(s) to each affected eye 2 times a day as needed for dry eyes  oxycodone-acetaminophen 5 mg-325 mg oral tablet: 2 tab(s) orally every 6 hours, As needed, Severe Pain (7 - 10) MDD:maximum daily dose: oxycodone 40mg; acetaminophen 2600mg  pantoprazole 40 mg oral delayed release tablet: 1 tab(s) orally once a day (before a meal)  predniSONE 50 mg oral tablet: 1 tab(s) orally once a day   simethicone 80 mg oral tablet, chewable: 1 tab(s) orally 3 times a day, As needed, Gas  vancomycin 125 mg oral capsule: 1 cap(s) orally 4 times a day    albuterol 90 mcg/inh inhalation aerosol: 2 puff(s) inhaled every 6 hours, As needed, Shortness of Breath and/or Wheezing  amLODIPine 10 mg oral tablet: 1 tab(s) orally once a day  atovaquone 750 mg/5 mL oral suspension: 10 milliliter(s) orally once a day  cloNIDine 0.1 mg oral tablet: 1 tab(s) orally once a day  docusate sodium 100 mg oral capsule: 1 cap(s) orally 2 times a day, As Needed  gabapentin 100 mg oral capsule: 1 cap(s) orally 3 times a day MDD:Maximum daily dose 300  hydroxychloroquine 200 mg oral tablet: 1 tab(s) orally once a day MDD:200 daily  melatonin 3 mg oral tablet: 1 tab(s) orally once a day (at bedtime), As needed, Insomnia  ocular lubricant ophthalmic solution: 1 drop(s) to each affected eye 2 times a day as needed for dry eyes  pantoprazole 40 mg oral delayed release tablet: 1 tab(s) orally once a day (before a meal)  predniSONE 50 mg oral tablet: 1 tab(s) orally once a day   simethicone 80 mg oral tablet, chewable: 1 tab(s) orally 3 times a day, As needed, Gas  vancomycin 125 mg oral capsule: 1 cap(s) orally 4 times a day

## 2021-12-09 NOTE — DISCHARGE NOTE PROVIDER - NSDCCPCAREPLAN_GEN_ALL_CORE_FT
PRINCIPAL DISCHARGE DIAGNOSIS  Diagnosis: ESRD needing dialysis  Assessment and Plan of Treatment: - NATALIA on HD TTS  - no renal recovery   - Care discussed with Dr. Pena, recommends to follow up as an outpatient        SECONDARY DISCHARGE DIAGNOSES  Diagnosis: Sepsis due to methicillin resistant Staphylococcus aureus (MRSA)  Assessment and Plan of Treatment: Patient admitted for continuation of her treatment for MRSA sepsis; dialysis patient  - vancomycin 500 mg iv post dialysis sessions three times a week until 12/20; nephrology aware and will order for the outpatient dialysis center  - can complete total 14 days po vancomycin for CDiff      Diagnosis: C. difficile diarrhea  Assessment and Plan of Treatment: - you will be on oral antibiotics 10 more days  - if you develope and fevers, chills or shakes /  worsening abdominal pain or not feeling well please come back in the hospital    Diagnosis: Enteritis due to Yersinia enterocolitica  Assessment and Plan of Treatment: - you received full course of antibiotics in the hospital  - if you develope and fevers, chills or shakes /  worsening abdominal pain or not feeling well please come back in the hospital  - you were seen by Dr. Waldron in the hospital

## 2021-12-09 NOTE — DISCHARGE NOTE PROVIDER - HOSPITAL COURSE
CC: for HD, Hyperkalemia (01 Dec 2021 18:03)    Patient is a 41/ F with PMHx of  bacteremia,  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 Found to have K of 6.1. She denies any cp/n/v. c/o some SOB and leg edema.     INTERVAL HPI/ OVERNIGHT EVENTS: Pt was seen and examined, reports no fevers or chills, no abd cramps, had small semiformed stool yesterday. POC discussed with PT.     Physical Exam:   GENERAL APPEARANCE:  NAD, hemodynamically stable  T(C): 36.7 (12-09-21 @ 12:30), Max: 36.9 (12-09-21 @ 08:35)  HR: 102 (12-09-21 @ 13:24) (67 - 102)  BP: 164/115 (12-09-21 @ 13:24) (104/73 - 164/115)  RR: 18 (12-09-21 @ 13:24) (10 - 20)  SpO2: 100% (12-09-21 @ 13:24) (97% - 100%)  HEENT:  Head is normocephalic    Skin:  Warm and dry without any rash   NECK:  Supple without lymphadenopathy.   HEART:  Regular rate and rhythm. normal S1 and S2, No M/R/G  LUNGS:  Good ins/exp effort, no W/R/R/C  ABDOMEN:  Soft, nontender, nondistended with good bowel sounds heard  EXTREMITIES:  Without cyanosis, clubbing or edema.   NEUROLOGICAL:  Gross nonfocal

## 2021-12-09 NOTE — PROGRESS NOTE ADULT - SUBJECTIVE AND OBJECTIVE BOX
Patient is a 41y Female who reports no complaints as new, seen with HD. No new events, reported.   s/p IJ TDC insertion   diarrhea improved     MEDICATIONS  (STANDING):  amLODIPine   Tablet 10 milliGRAM(s) Oral daily  artificial  tears Solution 1 Drop(s) Both EYES two times a day  atovaquone  Suspension 1500 milliGRAM(s) Oral daily  chlorhexidine 4% Liquid 1 Application(s) Topical <User Schedule>  cloNIDine 0.1 milliGRAM(s) Oral daily  DAPTOmycin IVPB 350 milliGRAM(s) IV Intermittent every 48 hours  epoetin amee-epbx (RETACRIT) Injectable 53427 Unit(s) IV Push <User Schedule>  gabapentin 100 milliGRAM(s) Oral three times a day  heparin   Injectable 5000 Unit(s) SubCutaneous every 12 hours  heparin   Injectable. 1000 Unit(s) Dialysis. <User Schedule>  hydroxychloroquine 200 milliGRAM(s) Oral daily  influenza   Vaccine 0.5 milliLiter(s) IntraMuscular once  pantoprazole    Tablet 40 milliGRAM(s) Oral before breakfast  predniSONE   Tablet 50 milliGRAM(s) Oral daily  vancomycin    Solution 125 milliGRAM(s) Oral every 6 hours    MEDICATIONS  (PRN):  acetaminophen     Tablet .. 650 milliGRAM(s) Oral every 6 hours PRN Temp greater or equal to 38C (100.4F), Mild Pain (1 - 3)  melatonin 3 milliGRAM(s) Oral at bedtime PRN Insomnia  ondansetron Injectable 4 milliGRAM(s) IV Push every 8 hours PRN Nausea and/or Vomiting  oxyCODONE    IR 10 milliGRAM(s) Oral every 4 hours PRN Severe Pain (7 - 10)  oxyCODONE    IR 5 milliGRAM(s) Oral every 4 hours PRN Moderate Pain (4 - 6)  simethicone 80 milliGRAM(s) Chew three times a day PRN Gas  sodium chloride 0.9% lock flush 10 milliLiter(s) IV Push every 1 hour PRN Pre/post blood products, medications, blood draw, and to maintain line patency        Vital Signs Last 24 Hrs  T(C): 36.7 (09 Dec 2021 12:30), Max: 36.9 (09 Dec 2021 08:35)  T(F): 98 (09 Dec 2021 12:30), Max: 98.4 (09 Dec 2021 08:35)  HR: 102 (09 Dec 2021 13:24) (67 - 102)  BP: 164/115 (09 Dec 2021 13:24) (104/73 - 164/115)  BP(mean): 131 (09 Dec 2021 13:24) (131 - 131)  RR: 18 (09 Dec 2021 13:24) (10 - 20)  SpO2: 100% (09 Dec 2021 13:24) (97% - 100%)      PHYSICAL EXAM:    Constitutional: NAD, frail. >>stated age  HEENT: dry MM  Neck: No LAD, No JVD  Respiratory: dist  Cardiovascular: S1 and S2  Extremities: No peripheral edema  Neurological: Alert  : No Salgado  Skin: No rashes  Access: IJ TDC         LABS:                                   10.1   15.96 )-----------( 236      ( 09 Dec 2021 08:44 )             32.0                         10.2   16.00 )-----------( 264      ( 08 Dec 2021 08:13 )             31.6         136    |  103    |  88     ----------------------------<  98        09 Dec 2021 08:44  3.6     |  20     |  5.76     136    |  103    |  61     ----------------------------<  90        08 Dec 2021 08:13  4.5     |  21     |  4.21     135    |  101    |  99     ----------------------------<  82        07 Dec 2021 07:55  3.3     |  20     |  6.10     Ca    7.6        09 Dec 2021 08:44  Ca    7.4        08 Dec 2021 08:13    Phos  6.2       09 Dec 2021 08:44  Phos  5.1       07 Dec 2021 07:55    Mg     1.5       06 Dec 2021 08:49    TPro  x      /  Alb  2.8    /  TBili  x      /        09 Dec 2021 08:44  DBili  x      /  AST  x      /  ALT  x      /  AlkPhos  x        TPro  x      /  Alb  2.8    /  TBili  x      /        07 Dec 2021 07:55  DBili  x      /  AST  x      /  ALT  x      /  AlkPhos  x                Urine Studies:          RADIOLOGY & ADDITIONAL STUDIES:

## 2021-12-09 NOTE — PROGRESS NOTE ADULT - PROVIDER SPECIALTY LIST ADULT
Hospitalist
Infectious Disease
Hospitalist
Infectious Disease
Nephrology
Hospitalist
Infectious Disease
Nephrology

## 2021-12-09 NOTE — DISCHARGE NOTE NURSING/CASE MANAGEMENT/SOCIAL WORK - PATIENT PORTAL LINK FT
You can access the FollowMyHealth Patient Portal offered by [facility] by registering at the following website: [email or web address]. By joining [email or web address] FollowMyHealth portal, you will also be able to view your health information using other applications (apps) compatible with our system.

## 2021-12-15 ENCOUNTER — INPATIENT (INPATIENT)
Facility: HOSPITAL | Age: 41
LOS: 2 days | Discharge: HOME | DRG: 53 | End: 2021-12-18
Attending: FAMILY MEDICINE | Admitting: INTERNAL MEDICINE
Payer: MEDICAID

## 2021-12-15 VITALS
SYSTOLIC BLOOD PRESSURE: 140 MMHG | OXYGEN SATURATION: 100 % | HEART RATE: 116 BPM | TEMPERATURE: 99 F | HEIGHT: 65 IN | RESPIRATION RATE: 18 BRPM | DIASTOLIC BLOOD PRESSURE: 108 MMHG | WEIGHT: 119.93 LBS

## 2021-12-15 DIAGNOSIS — K80.20 CALCULUS OF GALLBLADDER WITHOUT CHOLECYSTITIS WITHOUT OBSTRUCTION: Chronic | ICD-10-CM

## 2021-12-15 DIAGNOSIS — Z98.51 TUBAL LIGATION STATUS: Chronic | ICD-10-CM

## 2021-12-15 DIAGNOSIS — Z87.19 PERSONAL HISTORY OF OTHER DISEASES OF THE DIGESTIVE SYSTEM: Chronic | ICD-10-CM

## 2021-12-15 DIAGNOSIS — R56.9 UNSPECIFIED CONVULSIONS: ICD-10-CM

## 2021-12-15 LAB
ALBUMIN SERPL ELPH-MCNC: 3.5 G/DL — SIGNIFICANT CHANGE UP (ref 3.3–5)
ALP SERPL-CCNC: 63 U/L — SIGNIFICANT CHANGE UP (ref 40–120)
ALT FLD-CCNC: 18 U/L — SIGNIFICANT CHANGE UP (ref 12–78)
ANION GAP SERPL CALC-SCNC: 6 MMOL/L — SIGNIFICANT CHANGE UP (ref 5–17)
APTT BLD: 24.5 SEC — LOW (ref 27.5–35.5)
AST SERPL-CCNC: 13 U/L — LOW (ref 15–37)
BASOPHILS # BLD AUTO: 0.02 K/UL — SIGNIFICANT CHANGE UP (ref 0–0.2)
BASOPHILS NFR BLD AUTO: 0.2 % — SIGNIFICANT CHANGE UP (ref 0–2)
BILIRUB SERPL-MCNC: 0.4 MG/DL — SIGNIFICANT CHANGE UP (ref 0.2–1.2)
BUN SERPL-MCNC: 20 MG/DL — SIGNIFICANT CHANGE UP (ref 7–23)
CALCIUM SERPL-MCNC: 8.2 MG/DL — LOW (ref 8.5–10.1)
CHLORIDE SERPL-SCNC: 100 MMOL/L — SIGNIFICANT CHANGE UP (ref 96–108)
CO2 SERPL-SCNC: 31 MMOL/L — SIGNIFICANT CHANGE UP (ref 22–31)
CREAT SERPL-MCNC: 2.88 MG/DL — HIGH (ref 0.5–1.3)
EOSINOPHIL # BLD AUTO: 0.09 K/UL — SIGNIFICANT CHANGE UP (ref 0–0.5)
EOSINOPHIL NFR BLD AUTO: 0.7 % — SIGNIFICANT CHANGE UP (ref 0–6)
GLUCOSE SERPL-MCNC: 72 MG/DL — SIGNIFICANT CHANGE UP (ref 70–99)
HCT VFR BLD CALC: 40.3 % — SIGNIFICANT CHANGE UP (ref 34.5–45)
HGB BLD-MCNC: 12.5 G/DL — SIGNIFICANT CHANGE UP (ref 11.5–15.5)
IMM GRANULOCYTES NFR BLD AUTO: 1.2 % — SIGNIFICANT CHANGE UP (ref 0–1.5)
INR BLD: 0.92 RATIO — SIGNIFICANT CHANGE UP (ref 0.88–1.16)
LACTATE SERPL-SCNC: 2.4 MMOL/L — HIGH (ref 0.7–2)
LYMPHOCYTES # BLD AUTO: 1.72 K/UL — SIGNIFICANT CHANGE UP (ref 1–3.3)
LYMPHOCYTES # BLD AUTO: 14.2 % — SIGNIFICANT CHANGE UP (ref 13–44)
MCHC RBC-ENTMCNC: 30 PG — SIGNIFICANT CHANGE UP (ref 27–34)
MCHC RBC-ENTMCNC: 31 GM/DL — LOW (ref 32–36)
MCV RBC AUTO: 96.9 FL — SIGNIFICANT CHANGE UP (ref 80–100)
MONOCYTES # BLD AUTO: 1.18 K/UL — HIGH (ref 0–0.9)
MONOCYTES NFR BLD AUTO: 9.7 % — SIGNIFICANT CHANGE UP (ref 2–14)
NEUTROPHILS # BLD AUTO: 8.98 K/UL — HIGH (ref 1.8–7.4)
NEUTROPHILS NFR BLD AUTO: 74 % — SIGNIFICANT CHANGE UP (ref 43–77)
PLATELET # BLD AUTO: 253 K/UL — SIGNIFICANT CHANGE UP (ref 150–400)
POTASSIUM SERPL-MCNC: 3.5 MMOL/L — SIGNIFICANT CHANGE UP (ref 3.5–5.3)
POTASSIUM SERPL-SCNC: 3.5 MMOL/L — SIGNIFICANT CHANGE UP (ref 3.5–5.3)
PROT SERPL-MCNC: 7.5 GM/DL — SIGNIFICANT CHANGE UP (ref 6–8.3)
PROTHROM AB SERPL-ACNC: 10.7 SEC — SIGNIFICANT CHANGE UP (ref 10.6–13.6)
RBC # BLD: 4.16 M/UL — SIGNIFICANT CHANGE UP (ref 3.8–5.2)
RBC # FLD: 17.2 % — HIGH (ref 10.3–14.5)
SARS-COV-2 RNA SPEC QL NAA+PROBE: SIGNIFICANT CHANGE UP
SODIUM SERPL-SCNC: 137 MMOL/L — SIGNIFICANT CHANGE UP (ref 135–145)
WBC # BLD: 12.14 K/UL — HIGH (ref 3.8–10.5)
WBC # FLD AUTO: 12.14 K/UL — HIGH (ref 3.8–10.5)

## 2021-12-15 PROCEDURE — 95714 VEEG EA 12-26 HR UNMNTR: CPT

## 2021-12-15 PROCEDURE — 95700 EEG CONT REC W/VID EEG TECH: CPT

## 2021-12-15 PROCEDURE — 85025 COMPLETE CBC W/AUTO DIFF WBC: CPT

## 2021-12-15 PROCEDURE — 80069 RENAL FUNCTION PANEL: CPT

## 2021-12-15 PROCEDURE — 95816 EEG AWAKE AND DROWSY: CPT | Mod: 26

## 2021-12-15 PROCEDURE — 70551 MRI BRAIN STEM W/O DYE: CPT

## 2021-12-15 PROCEDURE — 99223 1ST HOSP IP/OBS HIGH 75: CPT

## 2021-12-15 PROCEDURE — 70551 MRI BRAIN STEM W/O DYE: CPT | Mod: 26

## 2021-12-15 PROCEDURE — 70460 CT HEAD/BRAIN W/DYE: CPT | Mod: 26,ME

## 2021-12-15 PROCEDURE — 99285 EMERGENCY DEPT VISIT HI MDM: CPT

## 2021-12-15 PROCEDURE — 93010 ELECTROCARDIOGRAM REPORT: CPT

## 2021-12-15 PROCEDURE — 80053 COMPREHEN METABOLIC PANEL: CPT

## 2021-12-15 PROCEDURE — 80185 ASSAY OF PHENYTOIN TOTAL: CPT

## 2021-12-15 PROCEDURE — 95816 EEG AWAKE AND DROWSY: CPT

## 2021-12-15 PROCEDURE — 99284 EMERGENCY DEPT VISIT MOD MDM: CPT

## 2021-12-15 PROCEDURE — 83605 ASSAY OF LACTIC ACID: CPT

## 2021-12-15 PROCEDURE — 80074 ACUTE HEPATITIS PANEL: CPT

## 2021-12-15 PROCEDURE — 80177 DRUG SCRN QUAN LEVETIRACETAM: CPT

## 2021-12-15 PROCEDURE — 85027 COMPLETE CBC AUTOMATED: CPT

## 2021-12-15 PROCEDURE — 36415 COLL VENOUS BLD VENIPUNCTURE: CPT

## 2021-12-15 PROCEDURE — 84146 ASSAY OF PROLACTIN: CPT

## 2021-12-15 PROCEDURE — G1004: CPT

## 2021-12-15 PROCEDURE — 99261: CPT

## 2021-12-15 RX ORDER — ONDANSETRON 8 MG/1
4 TABLET, FILM COATED ORAL EVERY 8 HOURS
Refills: 0 | Status: DISCONTINUED | OUTPATIENT
Start: 2021-12-15 | End: 2021-12-18

## 2021-12-15 RX ORDER — ACETAMINOPHEN 500 MG
650 TABLET ORAL EVERY 6 HOURS
Refills: 0 | Status: DISCONTINUED | OUTPATIENT
Start: 2021-12-15 | End: 2021-12-18

## 2021-12-15 RX ORDER — LEVETIRACETAM 250 MG/1
1000 TABLET, FILM COATED ORAL
Refills: 0 | Status: DISCONTINUED | OUTPATIENT
Start: 2021-12-15 | End: 2021-12-18

## 2021-12-15 RX ORDER — FOSPHENYTOIN 50 MG/ML
800 INJECTION INTRAMUSCULAR; INTRAVENOUS ONCE
Refills: 0 | Status: COMPLETED | OUTPATIENT
Start: 2021-12-15 | End: 2021-12-15

## 2021-12-15 RX ORDER — FOSPHENYTOIN 50 MG/ML
100 INJECTION INTRAMUSCULAR; INTRAVENOUS EVERY 8 HOURS
Refills: 0 | Status: DISCONTINUED | OUTPATIENT
Start: 2021-12-15 | End: 2021-12-18

## 2021-12-15 RX ORDER — LEVETIRACETAM 250 MG/1
1000 TABLET, FILM COATED ORAL ONCE
Refills: 0 | Status: COMPLETED | OUTPATIENT
Start: 2021-12-15 | End: 2021-12-15

## 2021-12-15 RX ADMIN — LEVETIRACETAM 400 MILLIGRAM(S): 250 TABLET, FILM COATED ORAL at 17:10

## 2021-12-15 RX ADMIN — FOSPHENYTOIN 132 MILLIGRAM(S) PE: 50 INJECTION INTRAMUSCULAR; INTRAVENOUS at 18:48

## 2021-12-15 NOTE — ED PROVIDER NOTE - CLINICAL SUMMARY MEDICAL DECISION MAKING FREE TEXT BOX
pt w/ seizure at HD w/ recent MRSA bacteremia, will CT head with IV contrast r/o abscess, neuro consult, admit.

## 2021-12-15 NOTE — ED ADULT NURSE NOTE - OBJECTIVE STATEMENT
41 year old female, AAO X3 , BIBA ed  from Dialysis for evaluation of witnessed seizure during treatment. pt has hx of seizure, stated  she is not any meds fro seizures. pt stated she feels confuse and very weak. pt has right chest dialysis port, pink band right arm. pt has hx of substance abuse.

## 2021-12-15 NOTE — H&P ADULT - NEUROLOGICAL DETAILS
AAOx2/Lethargic -> improved in ED/responds to pain/responds to verbal commands/cranial nerves intact/deep reflexes hypoactive

## 2021-12-15 NOTE — PHARMACOTHERAPY INTERVENTION NOTE - COMMENTS
Medication reconciliation completed.  Reviewed Medication list and confirmed med allergies with patient; confirmed with Dr. Kang Medx.  Pt confirms she has not taken any medication today except for the morning dose of Gabapentin 100mg 1 capsule 3 times daily.

## 2021-12-15 NOTE — ED ADULT TRIAGE NOTE - CHIEF COMPLAINT QUOTE
pt presents to ed via ems from Dialysis([facility] since october) for evaluation of witnessed seizure during treatment. pt has hx of seizure, unsure if she takes meds for it. pt has right chest dialysis port, pink band right arm. pt at baseline mental status, bgm wnl. per ems per staff, pt has hx of substance abuse.

## 2021-12-15 NOTE — H&P ADULT - ASSESSMENT
40 y/o F PMx significant for MRSA bacteremia, COPD, Hypertension, ESRD on hemodialysis (MWF) (kidney bx c/w ANCA vasculitis), cocaine and heroin abuse, depression, GERD, epilepsy, Raynaud's syndrome, Rheumatoid arthritis, SLE presents to the ED s/p tonic clonic seizure today while at dialysis. The patient denies any prodrome of subjective fevers, chills, blurry vision, or recent sick contacts. The patient states that her previous seizure was several years ago with no reported recurrence. In the ED Neurology and CCM were consulted.    #Status Epilepticus  ~admit to SICU  ~neuro checks q2h  ~fall precautions  ~seizure precautions  ~s/p Fosphenytoin load in the ED  ~cont. Fosphenytoin 100mg IVPB q8h  ~f/u MR Brain  ~obtain serum Phenytoin level in AM  ~24 hour EEG    #SLE/RA  ~cont. Hydroxychloroquine 200mg po daily  ~cont. Prednisone 50mg po daily    #COPD (compensated)  ~cont. Albuterol 2puffs q6h prn    #Vte ppx  ~IMPROVE Vte Risk score is 0  ~cont. SCDs 42 y/o F PMx significant for MRSA bacteremia, COPD, Hypertension, ESRD on hemodialysis (MWF) (kidney bx c/w ANCA vasculitis), cocaine and heroin abuse, depression, GERD, epilepsy, Raynaud's syndrome, Rheumatoid arthritis, SLE presents to the ED s/p tonic clonic seizure today while at dialysis. The patient denies any prodrome of subjective fevers, chills, blurry vision, or recent sick contacts. The patient states that her previous seizure was several years ago with no reported recurrence. In the ED Neurology and CCM were consulted.    #Status Epilepticus  ~admit to SICU  ~neuro checks q2h  ~fall precautions  ~seizure precautions  ~s/p Fosphenytoin load in the ED  ~cont. Fosphenytoin 100mg IVPB q8h  ~f/u MR Brain  ~obtain serum Phenytoin level in AM  ~24 hour EEG    #SLE/RA  ~cont. Hydroxychloroquine 200mg po daily  ~cont. Prednisone 50mg po daily    #COPD (compensated)  ~cont. Albuterol 2puffs q6h prn    #Hypertension  ~cont. Amlodipine 10mg po daily  ~cont. Clonidine (takes 0.1mg po daily)    #Infectious  Diarrhea with Clostridium difficile, Yersinia and E. coli  ~cont. contact isolation   ~cont. Vancomycin 125mg po q6h  ~patient completed 3 day course of Ceftriaxone on prior admission    #Chronic pain  ~cont. Gabapentin 100mg po tid  ~cont. Oxycodone/Acetaminophen 5-325mg po q6h prn severe pain    #MRSA bacteremia/IVDA  ~patient's last GRAHAM was negative for any notable vegetations   ~per ID cont. Vancomycin 500 mg IVPB HD until 12/20   ~f/u w/ ID consultation in the am    #Vte ppx  ~IMPROVE Vte Risk score is 0  ~cont. SCDs

## 2021-12-15 NOTE — ED ADULT NURSE REASSESSMENT NOTE - NS ED NURSE REASSESS COMMENT FT1
Received report from ANNIE Casillas. Pt resting comfortably, resp. even and unlabored. Denies CP, SOB, HA, and dizziness. VS as noted. NSR on the CM. In NAD.

## 2021-12-15 NOTE — H&P ADULT - HISTORY OF PRESENT ILLNESS
42 y/o F PMx significant for MRSA bacteremia, COPD, Hypertension, ESRD on hemodialysis (MWF) (kidney bx c/w ANCA vasculitis), cocaine and heroin abuse, depression, GERD, epilepsy, Raynaud's syndrome, Rheumatoid arthritis, SLE presents to the ED s/p tonic clonic seizure today while at dialysis.  42 y/o F PMx significant for MRSA bacteremia, COPD, Hypertension, ESRD on hemodialysis (MWF) (kidney bx c/w ANCA vasculitis), cocaine and heroin abuse, depression, GERD, epilepsy, Raynaud's syndrome, Rheumatoid arthritis, SLE presents to the [facility] s/p tonic clonic seizure today while at dialysis. The patient denies any prodrome of subjective fevers, chills, blurry vision, or recent sick contacts. The patient states that her previous seizure was several years ago with no reported recurrence. In the ED Neurology and CCM were consulted.

## 2021-12-15 NOTE — ED PROVIDER NOTE - OBJECTIVE STATEMENT
42 y/o F w/ PMHx of bacteremia, kidney bx proven ANCA vasculitis, COPD, depression, epilepsy, GERD, Raynaud's syndrome, heroin abuse, HTN, lupus, RA, sepsis, SLE presents to ED s/p tonic clonic seizure today while at dialysis.

## 2021-12-15 NOTE — ED ADULT NURSE NOTE - CHPI ED NUR SYMPTOMS NEG
Cardiology Follow Up    Aydee Stephen  1939  [id number]      Interval History: Aydee Stephen is here for follow up of hypertension  Since her last visit, she had aortobifemoral bypass with left iliofemoral thromboembolectomy  Surgery was uncomplicated  She has been feeling well without any chest pain or shortness of breath  She has quit smoking  BP has improved post procedure and amlodipine was discontinued  She reports good adherence with medications  Patient has a history of hypertension and is on multiple blood pressure medications including spironolactone, enalapril and carvedilol  Carvedilol was added last visit and atenolol was discontinued      The following portions of the patient's history were reviewed and updated as appropriate: allergies, current medications, past family history, past medical history, past social history, past surgical history and problem list     Current Outpatient Medications on File Prior to Visit   Medication Sig Dispense Refill    acetaminophen (TYLENOL) 325 mg tablet Take 2 tablets (650 mg total) by mouth every 6 (six) hours as needed for mild pain, headaches or fever 30 tablet 0    albuterol (VENTOLIN HFA) 90 mcg/act inhaler Inhale 2 puffs every 6 (six) hours as needed for wheezing 18 g 2    aspirin 81 MG tablet Take 2 tablets by mouth daily      atorvastatin (LIPITOR) 80 mg tablet take 1 tablet by mouth once daily 90 tablet 3    B Complex Vitamins (VITAMIN B-COMPLEX) TABS Take by mouth      Bioflavonoid Products (VITAMIN C PLUS PO) Take by mouth daily      carvedilol (COREG) 12 5 mg tablet Take 1 tablet (12 5 mg total) by mouth 2 (two) times a day with meals 60 tablet 5    clopidogrel (PLAVIX) 75 mg tablet Take 1 tablet (75 mg total) by mouth daily  0    docusate sodium (COLACE) 100 mg capsule Take 1 capsule (100 mg total) by mouth 2 (two) times a day 10 capsule 0    enalapril (VASOTEC) 20 mg tablet Take 1 tablet (20 mg total) by mouth 2 (two) times a day 180 tablet 3    fluticasone (FLONASE) 50 mcg/act nasal spray 1 spray into each nostril daily 16 g 3    gabapentin (NEURONTIN) 100 mg capsule Take 1 capsule (100 mg total) by mouth 3 (three) times a day 90 capsule 0    GARLIC PO Take by mouth daily       levothyroxine 25 mcg tablet Take 1 tablet (25 mcg total) by mouth daily in the early morning 90 tablet 3    magnesium oxide (MAG-OX) 400 mg Take 1 tablet (400 mg total) by mouth daily 30 tablet 5    potassium chloride (MICRO-K) 8 mEq CR capsule Take 8 mEq by mouth 2 (two) times a day      torsemide (DEMADEX) 5 MG tablet Take 1 tablet (5 mg total) by mouth daily 30 tablet 5    [DISCONTINUED] amLODIPine (NORVASC) 5 mg tablet Take 1 tablet (5 mg total) by mouth daily 90 tablet 3    Multiple Minerals (CALCIUM-MAGNESIUM-ZINC) TABS Take by mouth daily      Multiple Vitamins-Minerals (CENTRUM SILVER PO) Take by mouth      nicotine (NICODERM CQ) 14 mg/24hr TD 24 hr patch Place 1 patch on the skin every 24 hours (Patient not taking: Reported on 1/13/2020) 28 patch 0    spironolactone (ALDACTONE) 25 mg tablet Take 1 tablet (25 mg total) by mouth daily (Patient not taking: Reported on 1/13/2020) 30 tablet 5     No current facility-administered medications on file prior to visit  Review of Systems:  Review of Systems   Constitutional: Negative for chills, fatigue and fever  HENT: Negative for congestion, nosebleeds and postnasal drip  Respiratory: Negative for cough, chest tightness and shortness of breath  Cardiovascular: Negative for chest pain, palpitations and leg swelling  Gastrointestinal: Negative for abdominal distention, abdominal pain, diarrhea, nausea and vomiting  Endocrine: Negative for polydipsia, polyphagia and polyuria  Musculoskeletal: Positive for arthralgias and myalgias  Negative for gait problem  Skin: Negative for color change, pallor and rash     Allergic/Immunologic: Negative for environmental allergies, food allergies and immunocompromised state  Neurological: Negative for dizziness, seizures, syncope and light-headedness  Hematological: Negative for adenopathy  Does not bruise/bleed easily  Psychiatric/Behavioral: Negative for dysphoric mood  The patient is not nervous/anxious  Physical Exam:  /60 (BP Location: Left arm, Patient Position: Sitting, Cuff Size: Standard)   Ht 5' 4" (1 626 m)   Wt 53 5 kg (118 lb)   SpO2 99% Comment: RA  BMI 20 25 kg/m²     Physical Exam   Constitutional: She is oriented to person, place, and time  She appears well-developed  No distress  HENT:   Head: Normocephalic and atraumatic  Eyes: Pupils are equal, round, and reactive to light  Conjunctivae and EOM are normal    Neck: Neck supple  No JVD present  No thyromegaly present  Cardiovascular: Normal rate, regular rhythm and normal heart sounds  Exam reveals no gallop and no friction rub  No murmur heard  Pulmonary/Chest: Effort normal and breath sounds normal    Abdominal: Soft  She exhibits no distension  There is no tenderness  Musculoskeletal: She exhibits no edema  Neurological: She is alert and oriented to person, place, and time  No cranial nerve deficit  Skin: Skin is warm and dry  No rash noted  She is not diaphoretic  No erythema  Psychiatric: She has a normal mood and affect   Her behavior is normal  Judgment and thought content normal        Cardiographics  ECG: NSR with PACs    Labs:  Lab Results   Component Value Date    K 4 0 12/15/2019     (H) 12/15/2019    CO2 21 12/15/2019    CO2 21 12/09/2019    BUN 19 12/15/2019    CREATININE 0 63 12/15/2019    GLUCOSE 173 (H) 12/09/2019    CALCIUM 8 5 12/15/2019     Lab Results   Component Value Date    WBC 8 15 12/15/2019    HGB 8 7 (L) 12/15/2019    HCT 26 4 (L) 12/15/2019     (H) 12/15/2019     12/15/2019     Lab Results   Component Value Date    CHOL 151 09/09/2014    TRIG 24 09/25/2019    TRIG 58 09/09/2014    HDL 82 (H) 09/25/2019    HDL 72 09/09/2014    LDLDIRECT 43 04/17/2018    LDLDIRECT 67 09/09/2014     Imaging: Cta Abdominal W Run Off W Wo Contrast    Result Date: 10/9/2019  Narrative: CT ANGIOGRAM OF THE AORTA AND LOWER EXTREMITIES WITH IV CONTRAST INDICATION:  Severe aorto iliac and infrainguinal arterial occlusive disease with symptoms which may be consistent with rest pain equivalent in the right foot  Significant progression since previous evaluation  Risk of tissue loss  COMPARISON: None  TECHNIQUE:  CT angiogram examination of the abdomen, pelvis, and lower extremities was performed according to standard protocol with intravenous contrast   This examination, like all CT scans performed in the [facility], was performed utilizing techniques to minimize radiation dose exposure, including the use of iterative reconstruction and automated exposure control  3D reconstructions were performed an independent workstation, and are supplied for review  Rad dose 1814 08 mGy-cm IV Contrast:  150 mL of iohexol (OMNIPAQUE)  FINDINGS: VASCULAR STRUCTURES:   The aorta is normal in caliber  There is variant anatomy of the celiac and superior mesenteric  The left gastric, superior mesenteric, and celiac all take their origin on top of each other  There is stenosis of both renal arteries  There is poststenotic dilation, versus aneurysm of a hypertrophied L4 lumbar branch  This may merely be due to increased flow, due to the iliac disease  This should be followed to ensure regression after treatment of the iliac disease  Right side: Low dense material fills the entirety of the lumen of the common iliac  Distally, there is dense calcification filling the entirety of the lumen at the level of the distal common iliac  Please see image [id number]  I suspect this lesion is causative in the thrombosis of the right common iliac  The common iliac bifurcation is reconstituted  There is a very small external iliac  Some of this may be due to low flow  I suspect there is some atherosclerotic disease of the right external iliac is well  There is a patent right common femoral which would allow for retrograde access  The femoropopliteal system is patent  There is three-vessel runoff  The posterior tibial is the dominant vessel  Left side: The left common iliac shows calcified plaque causing a high-grade stenosis proximally  Please see image [id number]  This is in a vessel that is otherwise 7 to 8 mm in diameter  External iliac is patent  The femoropopliteal system is patent  There is three-vessel runoff  The posterior tibial is the dominant vessel  OTHER FINDINGS ABDOMEN LOWER CHEST:  No significant abnormality in the lung bases  LIVER/BILIARY TREE:  Unremarkable  GALLBLADDER:  No calcified gallstones  No pericholecystic inflammatory change  SPLEEN:  Unremarkable  Normal size  PANCREAS:  Unremarkable  ADRENAL GLANDS: Unremarkable  KIDNEYS/URETERS:  No solid renal mass  No hydronephrosis  No urinary tract calculi  PELVIS REPRODUCTIVE ORGANS:  Unremarkable for patient's age  URINARY BLADDER:  Unremarkable  ADDITIONAL ABDOMINAL AND PELVIC STRUCTURES STOMACH AND BOWEL: ABDOMINOPELVIC CAVITY:   No pathologically enlarged mesenteric or retroperitoneal lymph nodes  No ascites or free intraperitoneal air  ABDOMINAL WALL/INGUINAL REGIONS:  Unremarkable  OSSEOUS STRUCTURES:  There is a ventral epidural mass at the level of the body of L1  This is fairly dense  It's difficult to tell this is calcified, or enhancing  Possibilities include meningioma, calcified chronic herniation of disc, atypical osteophyte  Impression: High-grade densely calcified lesion of the distal external iliac on the right  There appears to be back thrombosis to the level of the origin of the common iliac  There is probably some intrinsic disease of the right external iliac is well    Its difficult to tell due to underfilling of the artery  Left common iliac artery stenosis Lesion of spine, consider MRI with contrast  Workstation performed: [id number]     Us Kidney And Bladder With Pvr    Result Date: 10/2/2019  Narrative: RENAL ULTRASOUND INDICATION:   I74 09: Other arterial embolism and thrombosis of abdominal aorta I70 219: Atherosclerosis of native arteries of extremities with intermittent claudication, unspecified extremity N18 3: Chronic kidney disease, stage 3 (moderate)  COMPARISON: None TECHNIQUE:   Ultrasound of the retroperitoneum was performed with a curvilinear transducer utilizing volumetric sweeps and still imaging techniques  FINDINGS: KIDNEYS: The right kidney is slightly smaller than the left  Right kidney:  9 8 x 2 7 cm  The renal cortex measures 1 1 cm  Left kidney:  11 4 x 4 4 cm  The renal cortex measures 1 6 cm  Right kidney Normal echogenicity and contour  No suspicious masses detected  No hydronephrosis  No shadowing calculi  No perinephric fluid collections  Left kidney Normal echogenicity and contour  No suspicious masses detected  No hydronephrosis  No shadowing calculi  No perinephric fluid collections  URETERS: Nonvisualized  BLADDER: Normally distended  The prevoiding volume is 1 23 mL  There is no post void residual  No focal thickening or mass lesions  Bilateral ureteral jets detected  Impression: 1  The right kidney is slightly smaller than the left  2   No post void residual  Workstation performed: [id number]       Discussion/Summary:  1  Chest pain, unspecified type    2  Essential hypertension    3  Chronic diastolic heart failure (HCC)      - BP improved post procedure and with switch to carvedilol   - Continue current medication regimen  Her medications were reviewed with her and her daughter     - continue spironolactone  - Continue enalapril   - Last LDL was at goal < 40 mg/dL  -   Discussed smoking cessation in detail    She has completed nicotine patches and quit smoking  - Echocardiogram  showed mild mitral regurgitation, normal EF and grade 2 diastolic function with mildly elevated pulmonary pressures  no blurred vision/no change in level of consciousness

## 2021-12-15 NOTE — CONSULT NOTE ADULT - ASSESSMENT
41 year old woman comes to ED after reported seizure during HD, exam appears encephalopathic ? post ictal.   Suggest:  EEG  MR head wo  no AC at this time.   41 year old woman comes to ED after reported seizure during HD, exam appears encephalopathic ? post ictal.   Suggest:  EEG  MR head wo  no AC at this time.

## 2021-12-15 NOTE — H&P ADULT - NSHPPHYSICALEXAM_GEN_ALL_CORE
Vital Signs Last 24 Hrs  T(C): 37 (15 Dec 2021 15:12), Max: 37.2 (15 Dec 2021 13:14)  T(F): 98.6 (15 Dec 2021 15:12), Max: 99 (15 Dec 2021 13:14)  HR: 84 (15 Dec 2021 20:48) (84 - 116)  BP: 117/78 (15 Dec 2021 20:48) (117/78 - 154/96)  BP(mean): 108 (15 Dec 2021 18:45) (108 - 114)  RR: 16 (15 Dec 2021 20:48) (12 - 18)  SpO2: 100% (15 Dec 2021 20:48) (98% - 100%)

## 2021-12-15 NOTE — ED ADULT NURSE NOTE - NSIMPLEMENTINTERV_GEN_ALL_ED
Implemented All Fall Risk Interventions:  [place] to call system. Call bell, personal items and telephone within reach. Instruct patient to call for assistance. Room bathroom lighting operational. Non-slip footwear when patient is off stretcher. Physically safe environment: no spills, clutter or unnecessary equipment. Stretcher in lowest position, wheels locked, appropriate side rails in place. Provide visual cue, wrist band, yellow gown, etc. Monitor gait and stability. Monitor for mental status changes and reorient to person, place, and time. Review medications for side effects contributing to fall risk. Reinforce activity limits and safety measures with patient and family.

## 2021-12-15 NOTE — CONSULT NOTE ADULT - ASSESSMENT
Pt is a 40 y/o F pmhx of CKD on dialysis, COPD, HTN and heroin use presents to ED BIBA for clonic/tonic seizure while at dialysis. Presents w/ baseline mental status and has history of seizure. Previously admitted and treated for MRSA bacteriemia. Admitted to medicine for further management. Pt placed on EEG for lethargy, which came back positive for possible seizure activity, ICU called for possible status.     1. Hx of Seizures w/ possible seizure likely activity on EEG     Plan:  -Pt awake and alert mentating appropriately throughout exam, not in status.   -Continue EEG monitoring for seizure activity as per neuro.   -Rest of care as per medicine team.     Case discussed w/ Dr. Russ    Pt is a 40 y/o F pmhx of CKD on dialysis, COPD, HTN and heroin use presents to ED BIBA for clonic/tonic seizure while at dialysis. Presents w/ baseline mental status and has history of seizure. Previously admitted and treated for MRSA bacteriemia. Admitted to medicine for further management. Pt placed on EEG for lethargy, which came back positive for possible seizure activity, ICU called for possible status.     1. Hx of Seizures w/ possible seizure likely activity on EEG     Plan:  -Pt awake and alert mentating appropriately throughout exam, not in status.   -Continue EEG monitoring for seizure activity as per neuro.   -Rest of care as per medicine team.     Case discussed w/ Dr. Russ       Agree with Above  No need for the ICU  Can go to a reg medical Floor  D/W ED Attending

## 2021-12-15 NOTE — ED PROVIDER NOTE - PROGRESS NOTE DETAILS
Ana MALDONADO for ED attending, Dr. Leon: Spoke w/ Dr. Pena who reports pt was recently admitted MRSA bacteriemia. Pt was at dialysis today and received vancomycin and had a generalized tonic clonic seizure witnessed by staff. Pt has no hx of seizure. As per Dr. Pena it is okay to use IV contrast, pt will not require dialysis today.

## 2021-12-15 NOTE — ED ADULT NURSE NOTE - CHIEF COMPLAINT QUOTE
pt presents to ed via ems from Dialysis([facility] since october) for evaluation of witnessed seizure during treatment. pt has hx of seizure, unsure if she takes meds for it. pt has right chest dialysis port, pink band right arm. pt at baseline mental status, bgm wnl. per ems per staff, pt has hx of substance abuse.

## 2021-12-15 NOTE — H&P ADULT - NSICDXPASTMEDICALHX_GEN_ALL_CORE_FT
PAST MEDICAL HISTORY:  Aphonia     Bacteremia     COPD (chronic obstructive pulmonary disease)     Depression     Epilepsy     GERD (gastroesophageal reflux disease)     H/O Raynaud's syndrome     Heroin abuse     HTN (hypertension)     Rheumatoid arthritis     Sepsis     Smoker     Systemic lupus erythematosus

## 2021-12-16 DIAGNOSIS — A04.72 ENTEROCOLITIS DUE TO CLOSTRIDIUM DIFFICILE, NOT SPECIFIED AS RECURRENT: ICD-10-CM

## 2021-12-16 DIAGNOSIS — I10 ESSENTIAL (PRIMARY) HYPERTENSION: ICD-10-CM

## 2021-12-16 DIAGNOSIS — N18.6 END STAGE RENAL DISEASE: ICD-10-CM

## 2021-12-16 DIAGNOSIS — E87.6 HYPOKALEMIA: ICD-10-CM

## 2021-12-16 DIAGNOSIS — I73.00 RAYNAUD'S SYNDROME WITHOUT GANGRENE: ICD-10-CM

## 2021-12-16 DIAGNOSIS — F14.10 COCAINE ABUSE, UNCOMPLICATED: ICD-10-CM

## 2021-12-16 DIAGNOSIS — Z79.52 LONG TERM (CURRENT) USE OF SYSTEMIC STEROIDS: ICD-10-CM

## 2021-12-16 DIAGNOSIS — K21.9 GASTRO-ESOPHAGEAL REFLUX DISEASE WITHOUT ESOPHAGITIS: ICD-10-CM

## 2021-12-16 DIAGNOSIS — Z88.5 ALLERGY STATUS TO NARCOTIC AGENT: ICD-10-CM

## 2021-12-16 DIAGNOSIS — E87.5 HYPERKALEMIA: ICD-10-CM

## 2021-12-16 DIAGNOSIS — N17.9 ACUTE KIDNEY FAILURE, UNSPECIFIED: ICD-10-CM

## 2021-12-16 DIAGNOSIS — A04.6 ENTERITIS DUE TO YERSINIA ENTEROCOLITICA: ICD-10-CM

## 2021-12-16 DIAGNOSIS — F17.200 NICOTINE DEPENDENCE, UNSPECIFIED, UNCOMPLICATED: ICD-10-CM

## 2021-12-16 DIAGNOSIS — D64.9 ANEMIA, UNSPECIFIED: ICD-10-CM

## 2021-12-16 DIAGNOSIS — A04.4 OTHER INTESTINAL ESCHERICHIA COLI INFECTIONS: ICD-10-CM

## 2021-12-16 DIAGNOSIS — I77.89 OTHER SPECIFIED DISORDERS OF ARTERIES AND ARTERIOLES: ICD-10-CM

## 2021-12-16 DIAGNOSIS — F11.10 OPIOID ABUSE, UNCOMPLICATED: ICD-10-CM

## 2021-12-16 DIAGNOSIS — G40.909 EPILEPSY, UNSPECIFIED, NOT INTRACTABLE, WITHOUT STATUS EPILEPTICUS: ICD-10-CM

## 2021-12-16 DIAGNOSIS — M32.9 SYSTEMIC LUPUS ERYTHEMATOSUS, UNSPECIFIED: ICD-10-CM

## 2021-12-16 DIAGNOSIS — Z99.2 DEPENDENCE ON RENAL DIALYSIS: ICD-10-CM

## 2021-12-16 DIAGNOSIS — M06.9 RHEUMATOID ARTHRITIS, UNSPECIFIED: ICD-10-CM

## 2021-12-16 DIAGNOSIS — A41.02 SEPSIS DUE TO METHICILLIN RESISTANT STAPHYLOCOCCUS AUREUS: ICD-10-CM

## 2021-12-16 DIAGNOSIS — J44.9 CHRONIC OBSTRUCTIVE PULMONARY DISEASE, UNSPECIFIED: ICD-10-CM

## 2021-12-16 LAB
ALBUMIN SERPL ELPH-MCNC: 2.9 G/DL — LOW (ref 3.3–5)
ALP SERPL-CCNC: 57 U/L — SIGNIFICANT CHANGE UP (ref 40–120)
ALT FLD-CCNC: 22 U/L — SIGNIFICANT CHANGE UP (ref 12–78)
ANION GAP SERPL CALC-SCNC: 8 MMOL/L — SIGNIFICANT CHANGE UP (ref 5–17)
AST SERPL-CCNC: 17 U/L — SIGNIFICANT CHANGE UP (ref 15–37)
BASOPHILS # BLD AUTO: 0.02 K/UL — SIGNIFICANT CHANGE UP (ref 0–0.2)
BASOPHILS NFR BLD AUTO: 0.2 % — SIGNIFICANT CHANGE UP (ref 0–2)
BILIRUB SERPL-MCNC: 0.4 MG/DL — SIGNIFICANT CHANGE UP (ref 0.2–1.2)
BUN SERPL-MCNC: 30 MG/DL — HIGH (ref 7–23)
CALCIUM SERPL-MCNC: 7.8 MG/DL — LOW (ref 8.5–10.1)
CHLORIDE SERPL-SCNC: 100 MMOL/L — SIGNIFICANT CHANGE UP (ref 96–108)
CO2 SERPL-SCNC: 28 MMOL/L — SIGNIFICANT CHANGE UP (ref 22–31)
CREAT SERPL-MCNC: 3.96 MG/DL — HIGH (ref 0.5–1.3)
EOSINOPHIL # BLD AUTO: 0.1 K/UL — SIGNIFICANT CHANGE UP (ref 0–0.5)
EOSINOPHIL NFR BLD AUTO: 1 % — SIGNIFICANT CHANGE UP (ref 0–6)
GLUCOSE SERPL-MCNC: 107 MG/DL — HIGH (ref 70–99)
HAV IGM SER-ACNC: SIGNIFICANT CHANGE UP
HBV CORE IGM SER-ACNC: SIGNIFICANT CHANGE UP
HBV SURFACE AG SER-ACNC: SIGNIFICANT CHANGE UP
HCT VFR BLD CALC: 40.5 % — SIGNIFICANT CHANGE UP (ref 34.5–45)
HCV AB S/CO SERPL IA: 0.19 S/CO — SIGNIFICANT CHANGE UP (ref 0–0.99)
HCV AB SERPL-IMP: SIGNIFICANT CHANGE UP
HGB BLD-MCNC: 12.6 G/DL — SIGNIFICANT CHANGE UP (ref 11.5–15.5)
IMM GRANULOCYTES NFR BLD AUTO: 0.7 % — SIGNIFICANT CHANGE UP (ref 0–1.5)
LYMPHOCYTES # BLD AUTO: 1.86 K/UL — SIGNIFICANT CHANGE UP (ref 1–3.3)
LYMPHOCYTES # BLD AUTO: 18 % — SIGNIFICANT CHANGE UP (ref 13–44)
MCHC RBC-ENTMCNC: 30.1 PG — SIGNIFICANT CHANGE UP (ref 27–34)
MCHC RBC-ENTMCNC: 31.1 GM/DL — LOW (ref 32–36)
MCV RBC AUTO: 96.9 FL — SIGNIFICANT CHANGE UP (ref 80–100)
MONOCYTES # BLD AUTO: 1.1 K/UL — HIGH (ref 0–0.9)
MONOCYTES NFR BLD AUTO: 10.6 % — SIGNIFICANT CHANGE UP (ref 2–14)
NEUTROPHILS # BLD AUTO: 7.19 K/UL — SIGNIFICANT CHANGE UP (ref 1.8–7.4)
NEUTROPHILS NFR BLD AUTO: 69.5 % — SIGNIFICANT CHANGE UP (ref 43–77)
PHENYTOIN FREE SERPL-MCNC: 12.2 UG/ML — SIGNIFICANT CHANGE UP (ref 10–20)
PLATELET # BLD AUTO: 243 K/UL — SIGNIFICANT CHANGE UP (ref 150–400)
POTASSIUM SERPL-MCNC: 3.7 MMOL/L — SIGNIFICANT CHANGE UP (ref 3.5–5.3)
POTASSIUM SERPL-SCNC: 3.7 MMOL/L — SIGNIFICANT CHANGE UP (ref 3.5–5.3)
PROLACTIN SERPL-MCNC: 16.3 NG/ML — SIGNIFICANT CHANGE UP (ref 3.4–24.1)
PROT SERPL-MCNC: 6.4 GM/DL — SIGNIFICANT CHANGE UP (ref 6–8.3)
RBC # BLD: 4.18 M/UL — SIGNIFICANT CHANGE UP (ref 3.8–5.2)
RBC # FLD: 17 % — HIGH (ref 10.3–14.5)
SODIUM SERPL-SCNC: 136 MMOL/L — SIGNIFICANT CHANGE UP (ref 135–145)
WBC # BLD: 10.34 K/UL — SIGNIFICANT CHANGE UP (ref 3.8–10.5)
WBC # FLD AUTO: 10.34 K/UL — SIGNIFICANT CHANGE UP (ref 3.8–10.5)

## 2021-12-16 PROCEDURE — 99232 SBSQ HOSP IP/OBS MODERATE 35: CPT

## 2021-12-16 PROCEDURE — 99233 SBSQ HOSP IP/OBS HIGH 50: CPT

## 2021-12-16 RX ORDER — LANOLIN ALCOHOL/MO/W.PET/CERES
3 CREAM (GRAM) TOPICAL AT BEDTIME
Refills: 0 | Status: DISCONTINUED | OUTPATIENT
Start: 2021-12-16 | End: 2021-12-18

## 2021-12-16 RX ORDER — HYDROXYCHLOROQUINE SULFATE 200 MG
200 TABLET ORAL DAILY
Refills: 0 | Status: DISCONTINUED | OUTPATIENT
Start: 2021-12-16 | End: 2021-12-18

## 2021-12-16 RX ORDER — OXYCODONE AND ACETAMINOPHEN 5; 325 MG/1; MG/1
2 TABLET ORAL EVERY 6 HOURS
Refills: 0 | Status: DISCONTINUED | OUTPATIENT
Start: 2021-12-16 | End: 2021-12-18

## 2021-12-16 RX ORDER — INFLUENZA VIRUS VACCINE 15; 15; 15; 15 UG/.5ML; UG/.5ML; UG/.5ML; UG/.5ML
0.5 SUSPENSION INTRAMUSCULAR ONCE
Refills: 0 | Status: COMPLETED | OUTPATIENT
Start: 2021-12-16 | End: 2021-12-16

## 2021-12-16 RX ORDER — PANTOPRAZOLE SODIUM 20 MG/1
40 TABLET, DELAYED RELEASE ORAL
Refills: 0 | Status: DISCONTINUED | OUTPATIENT
Start: 2021-12-16 | End: 2021-12-18

## 2021-12-16 RX ORDER — VANCOMYCIN HCL 1 G
125 VIAL (EA) INTRAVENOUS EVERY 6 HOURS
Refills: 0 | Status: DISCONTINUED | OUTPATIENT
Start: 2021-12-16 | End: 2021-12-18

## 2021-12-16 RX ORDER — DAPTOMYCIN 500 MG/10ML
300 INJECTION, POWDER, LYOPHILIZED, FOR SOLUTION INTRAVENOUS
Refills: 0 | Status: COMPLETED | OUTPATIENT
Start: 2021-12-16 | End: 2021-12-18

## 2021-12-16 RX ORDER — AMLODIPINE BESYLATE 2.5 MG/1
10 TABLET ORAL DAILY
Refills: 0 | Status: DISCONTINUED | OUTPATIENT
Start: 2021-12-16 | End: 2021-12-18

## 2021-12-16 RX ORDER — ALBUTEROL 90 UG/1
2 AEROSOL, METERED ORAL EVERY 6 HOURS
Refills: 0 | Status: DISCONTINUED | OUTPATIENT
Start: 2021-12-16 | End: 2021-12-18

## 2021-12-16 RX ORDER — ATOVAQUONE 750 MG/5ML
1500 SUSPENSION ORAL DAILY
Refills: 0 | Status: DISCONTINUED | OUTPATIENT
Start: 2021-12-16 | End: 2021-12-18

## 2021-12-16 RX ORDER — GABAPENTIN 400 MG/1
100 CAPSULE ORAL THREE TIMES A DAY
Refills: 0 | Status: DISCONTINUED | OUTPATIENT
Start: 2021-12-16 | End: 2021-12-18

## 2021-12-16 RX ADMIN — Medication 125 MILLIGRAM(S): at 12:38

## 2021-12-16 RX ADMIN — LEVETIRACETAM 1000 MILLIGRAM(S): 250 TABLET, FILM COATED ORAL at 09:38

## 2021-12-16 RX ADMIN — Medication 1 APPLICATION(S): at 21:05

## 2021-12-16 RX ADMIN — OXYCODONE AND ACETAMINOPHEN 2 TABLET(S): 5; 325 TABLET ORAL at 06:39

## 2021-12-16 RX ADMIN — Medication 125 MILLIGRAM(S): at 23:28

## 2021-12-16 RX ADMIN — PANTOPRAZOLE SODIUM 40 MILLIGRAM(S): 20 TABLET, DELAYED RELEASE ORAL at 06:14

## 2021-12-16 RX ADMIN — Medication 1 APPLICATION(S): at 12:38

## 2021-12-16 RX ADMIN — Medication 125 MILLIGRAM(S): at 17:55

## 2021-12-16 RX ADMIN — ATOVAQUONE 1500 MILLIGRAM(S): 750 SUSPENSION ORAL at 09:37

## 2021-12-16 RX ADMIN — FOSPHENYTOIN 104 MILLIGRAM(S) PE: 50 INJECTION INTRAMUSCULAR; INTRAVENOUS at 13:00

## 2021-12-16 RX ADMIN — Medication 50 MILLIGRAM(S): at 09:38

## 2021-12-16 RX ADMIN — Medication 125 MILLIGRAM(S): at 07:20

## 2021-12-16 RX ADMIN — DAPTOMYCIN 112 MILLIGRAM(S): 500 INJECTION, POWDER, LYOPHILIZED, FOR SOLUTION INTRAVENOUS at 17:55

## 2021-12-16 RX ADMIN — FOSPHENYTOIN 104 MILLIGRAM(S) PE: 50 INJECTION INTRAMUSCULAR; INTRAVENOUS at 21:02

## 2021-12-16 RX ADMIN — LEVETIRACETAM 1000 MILLIGRAM(S): 250 TABLET, FILM COATED ORAL at 21:02

## 2021-12-16 RX ADMIN — OXYCODONE AND ACETAMINOPHEN 2 TABLET(S): 5; 325 TABLET ORAL at 07:21

## 2021-12-16 RX ADMIN — Medication 1 DROP(S): at 21:05

## 2021-12-16 RX ADMIN — Medication 1 DROP(S): at 12:38

## 2021-12-16 RX ADMIN — OXYCODONE AND ACETAMINOPHEN 2 TABLET(S): 5; 325 TABLET ORAL at 12:38

## 2021-12-16 RX ADMIN — GABAPENTIN 100 MILLIGRAM(S): 400 CAPSULE ORAL at 06:14

## 2021-12-16 RX ADMIN — Medication 200 MILLIGRAM(S): at 09:38

## 2021-12-16 RX ADMIN — AMLODIPINE BESYLATE 10 MILLIGRAM(S): 2.5 TABLET ORAL at 09:38

## 2021-12-16 RX ADMIN — Medication 0.1 MILLIGRAM(S): at 09:38

## 2021-12-16 RX ADMIN — OXYCODONE AND ACETAMINOPHEN 2 TABLET(S): 5; 325 TABLET ORAL at 13:34

## 2021-12-16 RX ADMIN — FOSPHENYTOIN 104 MILLIGRAM(S) PE: 50 INJECTION INTRAMUSCULAR; INTRAVENOUS at 06:14

## 2021-12-16 NOTE — PROGRESS NOTE ADULT - SUBJECTIVE AND OBJECTIVE BOX
HPI:  42 y/o F PMx significant for MRSA bacteremia, COPD, Hypertension, ESRD on hemodialysis (MWF) (kidney bx c/w ANCA vasculitis), cocaine and heroin abuse, depression, GERD, epilepsy, Raynaud's syndrome, Rheumatoid arthritis, SLE presents to the ED s/p tonic clonic seizure today while at dialysis. None since.    MEDICATIONS  (STANDING):  amLODIPine   Tablet 10 milliGRAM(s) Oral daily  artificial  tears Solution 1 Drop(s) Both EYES two times a day  atovaquone  Suspension 1500 milliGRAM(s) Oral daily  cloNIDine 0.1 milliGRAM(s) Oral daily  DAPTOmycin IVPB 300 milliGRAM(s) IV Intermittent every 48 hours  fosphenytoin IVPB 100 milliGRAM(s) PE IV Intermittent every 8 hours  gabapentin 100 milliGRAM(s) Oral three times a day  hydroxychloroquine 200 milliGRAM(s) Oral daily  influenza   Vaccine 0.5 milliLiter(s) IntraMuscular once  levETIRAcetam 1000 milliGRAM(s) Oral two times a day  pantoprazole    Tablet 40 milliGRAM(s) Oral before breakfast  predniSONE   Tablet 50 milliGRAM(s) Oral daily  silver sulfADIAZINE 1% Cream 1 Application(s) Topical two times a day  vancomycin    Solution 125 milliGRAM(s) Oral every 6 hours    MEDICATIONS  (PRN):  acetaminophen     Tablet .. 650 milliGRAM(s) Oral every 6 hours PRN Temp greater or equal to 38C (100.4F), Mild Pain (1 - 3)  ALBUTerol    90 MICROgram(s) HFA Inhaler 2 Puff(s) Inhalation every 6 hours PRN Shortness of Breath and/or Wheezing  aluminum hydroxide/magnesium hydroxide/simethicone Suspension 30 milliLiter(s) Oral every 4 hours PRN Dyspepsia  melatonin 3 milliGRAM(s) Oral at bedtime PRN Insomnia  ondansetron Injectable 4 milliGRAM(s) IV Push every 8 hours PRN Nausea and/or Vomiting  oxycodone    5 mG/acetaminophen 325 mG 2 Tablet(s) Oral every 6 hours PRN for severe pain      Vital Signs Last 24 Hrs  T(C): 37.4 (16 Dec 2021 14:00), Max: 37.4 (16 Dec 2021 14:00)  T(F): 99.3 (16 Dec 2021 14:00), Max: 99.3 (16 Dec 2021 14:00)  HR: 108 (16 Dec 2021 14:00) (66 - 120)  BP: 136/96 (16 Dec 2021 14:00) (117/78 - 154/96)  BP(mean): 108 (16 Dec 2021 14:00) (108 - 124)  RR: 20 (16 Dec 2021 14:00) (12 - 27)  SpO2: 97% (16 Dec 2021 14:00) (94% - 100%)  Neurological Exam:  HF: Patient is alert and oriented x 3. There is no aphasia or dysarthria. Follows complex commands.   CN: Vision is intact to confrontation. Pupils are equal and reactive. Extra ocular muscles are intact. There is no facial droop or asymmetry. Tongue is midline. Sensation is intact in the face. Other CN II-XII are intact.   Motor: motor examination all muscles are 5/5.   Sensory: intact to  touch.   DTR: 0-1/4 all 4 extremities. Babinski is negative bilateral.  Co-ord:  Finger to finger to nose is intact.         Phenytoin Level, Serum: 12.2 ug/mL (12.16.21 @ 09:13) Comprehensive Metabolic Panel (12.16.21 @ 06:14)   Sodium, Serum: 136 mmol/L   Potassium, Serum: 3.7 mmol/L   Chloride, Serum: 100 mmol/L   Carbon Dioxide, Serum: 28 mmol/L   Anion Gap, Serum: 8 mmol/L   Blood Urea Nitrogen, Serum: 30 mg/dL   Creatinine, Serum: 3.96 mg/dL   Glucose, Serum: 107 mg/dL   Calcium, Total Serum: 7.8 mg/dL   Protein Total, Serum: 6.4 gm/dL   Albumin, Serum: 2.9 g/dL   Bilirubin Total, Serum: 0.4 mg/dL   Alkaline Phosphatase, Serum: 57 U/L   Aspartate Aminotransferase (AST/SGOT): 17 U/L   Alanine Aminotransferase (ALT/SGPT): 22 U/L   eGFR if Non : 13:      < from: MR Head No Cont (12.15.21 @ 21:36) >    1)  Suboptimal but grossly diagnostic study, degraded by motion. There   are a few scattered small white matter lesions which are nonspecific. No   diffusion restriction or acute ischemia noted. No mass or hemorrhage   suggested..  2)  on coronal FLAIR imaging, there is subtle T2 hyperintensity in the   hippocampal region of both temporal lobes. Further assessment and   follow-up is recommended.    < end of copied text >    < from: EEG Awake or Drowsy (12.15.21 @ 16:57) >    Impression: Abnormal EEG because of slowing of the background activity,   delta range activity, consistent with a diffuse cerebral dysfunction,   maybe on the basis of a diffuse metabolic, toxic or structural   abnormality.  Generalized and transition epileptiform discharges with are potentially   epileptogenic.  Above findings can be seen in pulse 18 gentle states, abnormality   encephalopathies. Clinical correlation recommended. A prolonged, video   EEG is recommended to further clarify abnormalities.    < end of copied text >   HPI:  42 y/o F PMx significant for MRSA bacteremia, COPD, Hypertension, ESRD on hemodialysis (MWF) (kidney bx c/w ANCA vasculitis), cocaine and heroin abuse, depression, GERD, epilepsy, Raynaud's syndrome, Rheumatoid arthritis, SLE presents to the ED s/p tonic clonic seizure day of admission while at dialysis. None since. Hx of seizures, 8 years ago, treated with Keppra, but d/c.     MEDICATIONS  (STANDING):  amLODIPine   Tablet 10 milliGRAM(s) Oral daily  artificial  tears Solution 1 Drop(s) Both EYES two times a day  atovaquone  Suspension 1500 milliGRAM(s) Oral daily  cloNIDine 0.1 milliGRAM(s) Oral daily  DAPTOmycin IVPB 300 milliGRAM(s) IV Intermittent every 48 hours  fosphenytoin IVPB 100 milliGRAM(s) PE IV Intermittent every 8 hours  gabapentin 100 milliGRAM(s) Oral three times a day  hydroxychloroquine 200 milliGRAM(s) Oral daily  influenza   Vaccine 0.5 milliLiter(s) IntraMuscular once  levETIRAcetam 1000 milliGRAM(s) Oral two times a day  pantoprazole    Tablet 40 milliGRAM(s) Oral before breakfast  predniSONE   Tablet 50 milliGRAM(s) Oral daily  silver sulfADIAZINE 1% Cream 1 Application(s) Topical two times a day  vancomycin    Solution 125 milliGRAM(s) Oral every 6 hours    MEDICATIONS  (PRN):  acetaminophen     Tablet .. 650 milliGRAM(s) Oral every 6 hours PRN Temp greater or equal to 38C (100.4F), Mild Pain (1 - 3)  ALBUTerol    90 MICROgram(s) HFA Inhaler 2 Puff(s) Inhalation every 6 hours PRN Shortness of Breath and/or Wheezing  aluminum hydroxide/magnesium hydroxide/simethicone Suspension 30 milliLiter(s) Oral every 4 hours PRN Dyspepsia  melatonin 3 milliGRAM(s) Oral at bedtime PRN Insomnia  ondansetron Injectable 4 milliGRAM(s) IV Push every 8 hours PRN Nausea and/or Vomiting  oxycodone    5 mG/acetaminophen 325 mG 2 Tablet(s) Oral every 6 hours PRN for severe pain      Vital Signs Last 24 Hrs  T(C): 37.4 (16 Dec 2021 14:00), Max: 37.4 (16 Dec 2021 14:00)  T(F): 99.3 (16 Dec 2021 14:00), Max: 99.3 (16 Dec 2021 14:00)  HR: 108 (16 Dec 2021 14:00) (66 - 120)  BP: 136/96 (16 Dec 2021 14:00) (117/78 - 154/96)  BP(mean): 108 (16 Dec 2021 14:00) (108 - 124)  RR: 20 (16 Dec 2021 14:00) (12 - 27)  SpO2: 97% (16 Dec 2021 14:00) (94% - 100%)  Neurological Exam:  HF: Patient is alert and oriented x 3. There is no aphasia or dysarthria. Follows complex commands.   CN: Vision is intact to confrontation. Pupils are equal and reactive. Extra ocular muscles are intact. There is no facial droop or asymmetry. Tongue is midline. Sensation is intact in the face. Other CN II-XII are intact.   Motor: motor examination all muscles are 5/5.   Sensory: intact to  touch.   DTR: 0-1/4 all 4 extremities. Babinski is negative bilateral.  Co-ord:  Finger to finger to nose is intact.         Phenytoin Level, Serum: 12.2 ug/mL (12.16.21 @ 09:13) Comprehensive Metabolic Panel (12.16.21 @ 06:14)   Sodium, Serum: 136 mmol/L   Potassium, Serum: 3.7 mmol/L   Chloride, Serum: 100 mmol/L   Carbon Dioxide, Serum: 28 mmol/L   Anion Gap, Serum: 8 mmol/L   Blood Urea Nitrogen, Serum: 30 mg/dL   Creatinine, Serum: 3.96 mg/dL   Glucose, Serum: 107 mg/dL   Calcium, Total Serum: 7.8 mg/dL   Protein Total, Serum: 6.4 gm/dL   Albumin, Serum: 2.9 g/dL   Bilirubin Total, Serum: 0.4 mg/dL   Alkaline Phosphatase, Serum: 57 U/L   Aspartate Aminotransferase (AST/SGOT): 17 U/L   Alanine Aminotransferase (ALT/SGPT): 22 U/L   eGFR if Non : 13:      < from: MR Head No Cont (12.15.21 @ 21:36) >    1)  Suboptimal but grossly diagnostic study, degraded by motion. There   are a few scattered small white matter lesions which are nonspecific. No   diffusion restriction or acute ischemia noted. No mass or hemorrhage   suggested..  2)  on coronal FLAIR imaging, there is subtle T2 hyperintensity in the   hippocampal region of both temporal lobes. Further assessment and   follow-up is recommended.    < end of copied text >    < from: EEG Awake or Drowsy (12.15.21 @ 16:57) >    Impression: Abnormal EEG because of slowing of the background activity,   delta range activity, consistent with a diffuse cerebral dysfunction,   maybe on the basis of a diffuse metabolic, toxic or structural   abnormality.  Generalized and transition epileptiform discharges with are potentially   epileptogenic.  Above findings can be seen in pulse 18 gentle states, abnormality   encephalopathies. Clinical correlation recommended. A prolonged, video   EEG is recommended to further clarify abnormalities.    < end of copied text >

## 2021-12-16 NOTE — PATIENT PROFILE ADULT - FALL HARM RISK - HARM RISK INTERVENTIONS

## 2021-12-16 NOTE — PROGRESS NOTE ADULT - ASSESSMENT
41 year old woman comes to ED after reported seizure during HD, improved. MR head shows no acute changes, limited by motion, EEG abnormal. started on dilantin, ON kEPPRA.  serum level 12.  Suggest:  ongoing video EEG  continue dilantin 100 mg TID  supportive care 41 year old woman comes to ED after reported seizure during HD, improved. MR head shows no acute changes, limited by motion, EEG abnormal. started on dilantin, ON kEPPRA.  serum level 12.  Suggest:  ongoing video EEG  continue dilantin 100 mg TID  consider d/c Keppra  supportive care

## 2021-12-16 NOTE — PROGRESS NOTE ADULT - ASSESSMENT
42 y/o F PMx significant for MRSA bacteremia, COPD, Hypertension, ESRD on hemodialysis (MWF) (kidney bx c/w ANCA vasculitis), cocaine and heroin abuse, depression, GERD, epilepsy, Raynaud's syndrome, Rheumatoid arthritis, SLE presents to the ED s/p tonic clonic seizure today while at dialysis. The patient denies any prodrome of subjective fevers, chills, blurry vision, or recent sick contacts. The patient states that her previous seizure was several years ago with no reported recurrence. In the ED Neurology and CCM were consulted.    #Witnessed seizures    #SLE/RA  ~cont. Hydroxychloroquine 200mg po daily  ~cont. Prednisone 50mg po daily    #COPD (compensated)  ~cont. Albuterol 2puffs q6h prn    #Hypertension  ~cont. Amlodipine 10mg po daily  ~cont. Clonidine (takes 0.1mg po daily)    #Infectious  Diarrhea with Clostridium difficile, Yersinia and E. coli  ~cont. contact isolation   ~cont. Vancomycin 125mg po q6h till 12/20  ~patient completed 3 day course of Ceftriaxone on prior admission    #Chronic pain  ~cont. Gabapentin 100mg po tid - pt is refusing as it "gave me seizures"  ~cont. Oxycodone/Acetaminophen 5-325mg po q6h prn severe pain    #MRSA bacteremia/IVDA  ~patient's last GRAHAM was negative for any notable vegetations   ~per ID cont. Vancomycin 500 mg IVPB HD until 12/20   ~discussed with ID Dr. Harrington    #Vte ppx - cont. SCDs    #ESRD - on HD

## 2021-12-16 NOTE — PROGRESS NOTE ADULT - SUBJECTIVE AND OBJECTIVE BOX
CC: Seizures (16 Dec 2021 15:00)    HPI: 42 y/o F PMx significant for MRSA bacteremia, COPD, Hypertension, ESRD on hemodialysis (MWF) (kidney bx c/w ANCA vasculitis), cocaine and heroin abuse, depression, GERD, epilepsy, Raynaud's syndrome, Rheumatoid arthritis, SLE presents to the ED s/p tonic clonic seizure today while at dialysis. The patient denies any prodrome of subjective fevers, chills, blurry vision, or recent sick contacts. The patient states that her previous seizure was several years ago with no reported recurrence. In the ED Neurology and CCM were consulted. (15 Dec 2021 18:34)    INTERVAL HPI/OVERNIGHT EVENTS:  pt admitted to using THC, denies IVDA, asking for anxiolytics and pain meds  Other ROS reviewed and neg     Vital Signs Last 24 Hrs  T(C): 37.4 (16 Dec 2021 14:00), Max: 37.4 (16 Dec 2021 14:00)  T(F): 99.3 (16 Dec 2021 14:00), Max: 99.3 (16 Dec 2021 14:00)  HR: 118 (16 Dec 2021 15:00) (66 - 120)  BP: 131/103 (16 Dec 2021 15:00) (117/78 - 152/108)  BP(mean): 114 (16 Dec 2021 15:00) (108 - 124)  RR: 20 (16 Dec 2021 14:00) (12 - 27)  SpO2: 99% (16 Dec 2021 15:00) (94% - 100%)                  12.6   10.34 )-----------( 243      ( 16 Dec 2021 06:14 )             40.5     16 Dec 2021 06:14    136    |  100    |  30     ----------------------------<  107    3.7     |  28     |  3.96     Ca    7.8        16 Dec 2021 06:14    TPro  6.4    /  Alb  2.9    /  TBili  0.4    /  DBili  x      /  AST  17     /  ALT  22     /  AlkPhos  57     16 Dec 2021 06:14    PT/INR - ( 15 Dec 2021 13:37 )   PT: 10.7 sec;   INR: 0.92 ratio      PTT - ( 15 Dec 2021 13:37 )  PTT:24.5 sec  CAPILLARY BLOOD GLUCOSE    LIVER FUNCTIONS - ( 16 Dec 2021 06:14 )  Alb: 2.9 g/dL / Pro: 6.4 gm/dL / ALK PHOS: 57 U/L / ALT: 22 U/L / AST: 17 U/L / GGT: x           MEDICATIONS  (STANDING):  amLODIPine   Tablet 10 milliGRAM(s) Oral daily  artificial  tears Solution 1 Drop(s) Both EYES two times a day  atovaquone  Suspension 1500 milliGRAM(s) Oral daily  cloNIDine 0.1 milliGRAM(s) Oral daily  DAPTOmycin IVPB 300 milliGRAM(s) IV Intermittent every 48 hours  fosphenytoin IVPB 100 milliGRAM(s) PE IV Intermittent every 8 hours  gabapentin 100 milliGRAM(s) Oral three times a day  hydroxychloroquine 200 milliGRAM(s) Oral daily  influenza   Vaccine 0.5 milliLiter(s) IntraMuscular once  levETIRAcetam 1000 milliGRAM(s) Oral two times a day  pantoprazole    Tablet 40 milliGRAM(s) Oral before breakfast  predniSONE   Tablet 50 milliGRAM(s) Oral daily  silver sulfADIAZINE 1% Cream 1 Application(s) Topical two times a day  vancomycin    Solution 125 milliGRAM(s) Oral every 6 hours    MEDICATIONS  (PRN):  acetaminophen     Tablet .. 650 milliGRAM(s) Oral every 6 hours PRN Temp greater or equal to 38C (100.4F), Mild Pain (1 - 3)  ALBUTerol    90 MICROgram(s) HFA Inhaler 2 Puff(s) Inhalation every 6 hours PRN Shortness of Breath and/or Wheezing  aluminum hydroxide/magnesium hydroxide/simethicone Suspension 30 milliLiter(s) Oral every 4 hours PRN Dyspepsia  melatonin 3 milliGRAM(s) Oral at bedtime PRN Insomnia  ondansetron Injectable 4 milliGRAM(s) IV Push every 8 hours PRN Nausea and/or Vomiting  oxycodone    5 mG/acetaminophen 325 mG 2 Tablet(s) Oral every 6 hours PRN for severe pain    RADIOLOGY & ADDITIONAL TESTS: personally visualized    PHYSICAL EXAM:    General: older looking female in no acute distress  Eyes: conjunctiva and sclera clear  Head: Normocephalic; atraumatic  ENMT: No nasal discharge; airway clear, poor dentition  Neck: Supple; non tender; no masses  Respiratory: No wheezes, rales or rhonchi  Cardiovascular: S1, S2 reg, sinus tachycardia  Gastrointestinal: Soft non-tender non-distended; Normal bowel sounds  Genitourinary: No costovertebral angle tenderness  Extremities: No clubbing, cyanosis, + trace BL LE edema  Vascular: Peripheral pulses palpable 2+ bilaterally  Neurological: Alert and oriented x4  Skin: Warm and dry. Bluish discoloration of all extremities with healed scars all over it from IVDA  Musculoskeletal: Normal tone  Psychiatric: Anxious

## 2021-12-16 NOTE — CONSULT NOTE ADULT - SUBJECTIVE AND OBJECTIVE BOX
BRIEF HOSPITAL COURSE: Pt is a 40 y/o F pmhx of CKD on dialysis, COPD, HTN and heroin use presents to ED BIBA for clonic/tonic seizure while at dialysis. Presents w/ baseline mental status and has history of seizure. previous admitted and treated for MRSA bacteriemia. Admitted to medicine for further management.     Events last 24 hours: Pt placed on EEG for lethargy, which came back positive for possible seizure activity, ICU called for possible status. Pt seen at bedside awake and alert, responsive to questioning, denies any weakness, dizziness, loc, or memory loss.     PAST MEDICAL & SURGICAL HISTORY:  Lupus    Rheumatoid arthritis    H/O Raynaud&#x27;s syndrome    Heroin abuse    Smoker    Rheumatoid arthritis    Sepsis    HTN (hypertension)    COPD (chronic obstructive pulmonary disease)    Depression    Epilepsy    GERD (gastroesophageal reflux disease)    Raynaud&#x27;s syndrome without gangrene    Bacteremia    Systemic lupus erythematosus    Aphonia    H/O tubal ligation    H/O appendicitis    Gall bladder stones    H/O tracheostomy    S/P percutaneous endoscopic gastrostomy (PEG) tube placement      Allergies    morphine (Rash)  morphine (Unknown)    Intolerances      FAMILY HISTORY:  Family history of cardiomyopathy (Mother)        Review of Systems: As stated in HPI       Medications:  acetaminophen     Tablet .. 650 milliGRAM(s) Oral every 6 hours PRN  fosphenytoin IVPB 100 milliGRAM(s) PE IV Intermittent every 8 hours  fosphenytoin IVPB 800 milliGRAM(s) PE IV Intermittent once  levETIRAcetam 1000 milliGRAM(s) Oral two times a day  ondansetron Injectable 4 milliGRAM(s) IV Push every 8 hours PRN  aluminum hydroxide/magnesium hydroxide/simethicone Suspension 30 milliLiter(s) Oral every 4 hours PRN      ICU Vital Signs Last 24 Hrs  T(C): 37 (15 Dec 2021 15:12), Max: 37.2 (15 Dec 2021 13:14)  T(F): 98.6 (15 Dec 2021 15:12), Max: 99 (15 Dec 2021 13:14)  HR: 95 (15 Dec 2021 15:12) (95 - 116)  BP: 154/96 (15 Dec 2021 15:12) (140/108 - 154/96)  BP(mean): 114 (15 Dec 2021 15:12) (114 - 114)  ABP: --  ABP(mean): --  RR: 18 (15 Dec 2021 15:12) (18 - 18)  SpO2: 100% (15 Dec 2021 15:12) (100% - 100%)    Vital Signs Last 24 Hrs  T(C): 37 (15 Dec 2021 15:12), Max: 37.2 (15 Dec 2021 13:14)  T(F): 98.6 (15 Dec 2021 15:12), Max: 99 (15 Dec 2021 13:14)  HR: 95 (15 Dec 2021 15:12) (95 - 116)  BP: 154/96 (15 Dec 2021 15:12) (140/108 - 154/96)  BP(mean): 114 (15 Dec 2021 15:12) (114 - 114)  RR: 18 (15 Dec 2021 15:12) (18 - 18)  SpO2: 100% (15 Dec 2021 15:12) (100% - 100%)        I&O's Detail        LABS:                        12.5   12.14 )-----------( 253      ( 15 Dec 2021 13:37 )             40.3     12-15    137  |  100  |  20  ----------------------------<  72  3.5   |  31  |  2.88<H>    Ca    8.2<L>      15 Dec 2021 13:37    TPro  7.5  /  Alb  3.5  /  TBili  0.4  /  DBili  x   /  AST  13<L>  /  ALT  18  /  AlkPhos  63  12-15          CAPILLARY BLOOD GLUCOSE      POCT Blood Glucose.: 71 mg/dL (15 Dec 2021 13:14)    PT/INR - ( 15 Dec 2021 13:37 )   PT: 10.7 sec;   INR: 0.92 ratio         PTT - ( 15 Dec 2021 13:37 )  PTT:24.5 sec    CULTURES:      Physical Examination:    General: No acute distress.  Alert, oriented, interactive, nonfocal    HEENT: Pupils equal, reactive to light.  Symmetric.    PULM: Clear to auscultation bilaterally    CVS: Regular rate and rhythm    ABD: Soft, nondistended, nontender, normoactive bowel sounds, no masses    EXT: No edema    SKIN: Warm, scaring noted on b/l upper and lower extremities     Neuro: A/O X3, moving all extremities well, follows commands     RADIOLOGY:   < from: CT Head w/ IV Cont (12.15.21 @ 14:55) >  ACC: [id number] EXAM:  CT BRAIN IC                        PROCEDURE DATE:  12/15/2021    INTERPRETATION:  CT HEAD WITH IV CONTRAST  CLINICAL HISTORY: Seizure, nontraumatic  IMAGING TECHNIQUE:  Pre and postcontrast enhanced CT.  Axial Acqisition.  Sagittal and   coronal reformations.  COMPARISON:  Exam is compared to prior noncontrast study of June 28, 2020  FINDINGS:  *  HEMORRHAGE:  No evidence of intracranial hemorrhage.  *  BRAIN PARENCHYMA:  No abnormal regions of parenchymal attenuation. No   mass or mass effect.  *  VENTRICLES / SHIFT:  No hydrocephalus. No midline shift.  *  EXTRA-AXIAL / BASAL CISTERNS:  No extra-axial mass. Basal cisterns   preserved.  *  ENHANCEMENT: No evidence of abnormal parenchymal or dural enhancement  *  CALVARIUM AND EXTRACRANIAL SOFT TISSUES:  No depressed calvarial   fracture.  *  SINUSES, ORBITS, MASTOIDS:  The visualized paranasal sinuses and   mastoid air cells are well aerated.  IMPRESSION:  NO EVIDENCE OF AN ACUTE INTRACRANIAL HEMORRHAGE, MIDLINE SHIFT, OR   HYDROCEPHALUS. NO EVIDENCE OF A MASS OR ABNORMAL ENHANCEMENT.  --- End of Report ---  ROOPA ALDRIDGE MD; Attending Radiologist  This document has been electronically signed. Dec 15 2021  3:06PM    
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  Depression, GERD, epilepsy, Raynaud's syndrome, Rheumatoid arthritis, SLE presents to the ED s/p tonic clonic seizure during end of  dialysis. The patient denies any prodrome of subjective fevers, chills, blurry vision, or recent sick contacts. The patient states that her previous seizure was several years ago with no reported recurrence for which pt was taking AED's and then stopped.  EEG today + epileptogenic     today pt awake and denies any compaints   does not recall events   denies recent drug use       Patient is a 41y Female whom presented to the hospital with     PAST MEDICAL & SURGICAL HISTORY:  Rheumatoid arthritis    H/O Raynaud&#x27;s syndrome    Heroin abuse    Smoker    Sepsis    HTN (hypertension)    COPD (chronic obstructive pulmonary disease)    Depression    Epilepsy    GERD (gastroesophageal reflux disease)    Bacteremia    Systemic lupus erythematosus    Aphonia    H/O tubal ligation    H/O appendicitis    Gall bladder stones    H/O tracheostomy    S/P percutaneous endoscopic gastrostomy (PEG) tube placement        MEDICATIONS  (STANDING):  amLODIPine   Tablet 10 milliGRAM(s) Oral daily  artificial  tears Solution 1 Drop(s) Both EYES two times a day  atovaquone  Suspension 1500 milliGRAM(s) Oral daily  cloNIDine 0.1 milliGRAM(s) Oral daily  DAPTOmycin IVPB 300 milliGRAM(s) IV Intermittent every 48 hours  fosphenytoin IVPB 100 milliGRAM(s) PE IV Intermittent every 8 hours  gabapentin 100 milliGRAM(s) Oral three times a day  hydroxychloroquine 200 milliGRAM(s) Oral daily  influenza   Vaccine 0.5 milliLiter(s) IntraMuscular once  levETIRAcetam 1000 milliGRAM(s) Oral two times a day  pantoprazole    Tablet 40 milliGRAM(s) Oral before breakfast  predniSONE   Tablet 50 milliGRAM(s) Oral daily  silver sulfADIAZINE 1% Cream 1 Application(s) Topical two times a day  vancomycin    Solution 125 milliGRAM(s) Oral every 6 hours      Allergies    morphine (Rash)    Intolerances        SOCIAL HISTORY:  Denies ETOh,Smoking,     FAMILY HISTORY:  Family history of cardiomyopathy (Mother)        REVIEW OF SYSTEMS:    CONSTITUTIONAL: No weakness, fevers or chills  EYES/ENT: No visual changes;  No vertigo or throat pain   NECK: No pain or stiffness  RESPIRATORY: No cough, wheezing, hemoptysis; No shortness of breath  CARDIOVASCULAR: No chest pain or palpitations  GASTROINTESTINAL: No abdominal or epigastric pain. No nausea, vomiting, or hematemesis; No diarrhea or constipation. No melena or hematochezia.  GENITOURINARY: No dysuria, frequency or hematuria  NEUROLOGICAL: No numbness or weakness  SKIN: No itching, burning, rashes, or lesions   All other review of systems is negative unless indicated above.    VITAL:  T(C): , Max: 37.4 (12-16-21 @ 14:00)  T(F): , Max: 99.3 (12-16-21 @ 14:00)  HR: 105 (12-16-21 @ 22:00)  BP: 147/105 (12-16-21 @ 22:00)  BP(mean): 114 (12-16-21 @ 22:00)  RR: 23 (12-16-21 @ 22:00)  SpO2: 97% (12-16-21 @ 22:00)  Wt(kg): --    I and O's:        PHYSICAL EXAM:    Constitutional: NAD  HEENT: PERRLA, EOMI,  MMM  Neck: No LAD, No JVD  Respiratory: CTAB  Cardiovascular: S1 and S2  Gastrointestinal: BS+, soft, NT/ND  Extremities: No peripheral edema  Neurological: A/O x 3, no focal deficits  : No Salgado  Skin: No rashes  Access: Not applicable    LABS:                        12.6   10.34 )-----------( 243      ( 16 Dec 2021 06:14 )             40.5     12-16    136  |  100  |  30<H>  ----------------------------<  107<H>  3.7   |  28  |  3.96<H>    Ca    7.8<L>      16 Dec 2021 06:14    TPro  6.4  /  Alb  2.9<L>  /  TBili  0.4  /  DBili  x   /  AST  17  /  ALT  22  /  AlkPhos  57  12-16      Urine Studies:          RADIOLOGY & ADDITIONAL STUDIES:                  
Patient is a 41y old  Female who presents with a chief complaint of Seizures (15 Dec 2021 18:34)    HPI:  42 y/o F PMx significant for MRSA bacteremia, COPD, Hypertension, ESRD on hemodialysis (MWF) (kidney bx c/w ANCA vasculitis), cocaine and heroin abuse, depression, GERD, epilepsy, Raynaud's syndrome, Rheumatoid arthritis, SLE presents admitted on 12/15 from dialysis center for evaluation of seizure witnessed; the patient denies any illicit drug use, but states she took gabapentin. Patient was receiving vancomycin post dialysis for continued treatment of MRSA sepsis, until 12/20, also on po vancomycin for CDiff.           PMH: as above  PSH: as above  Meds: per reconciliation sheet, noted below  MEDICATIONS  (STANDING):  amLODIPine   Tablet 10 milliGRAM(s) Oral daily  artificial  tears Solution 1 Drop(s) Both EYES two times a day  atovaquone  Suspension 1500 milliGRAM(s) Oral daily  cloNIDine 0.1 milliGRAM(s) Oral daily  DAPTOmycin IVPB 300 milliGRAM(s) IV Intermittent every 48 hours  fosphenytoin IVPB 100 milliGRAM(s) PE IV Intermittent every 8 hours  gabapentin 100 milliGRAM(s) Oral three times a day  hydroxychloroquine 200 milliGRAM(s) Oral daily  influenza   Vaccine 0.5 milliLiter(s) IntraMuscular once  levETIRAcetam 1000 milliGRAM(s) Oral two times a day  pantoprazole    Tablet 40 milliGRAM(s) Oral before breakfast  predniSONE   Tablet 50 milliGRAM(s) Oral daily  silver sulfADIAZINE 1% Cream 1 Application(s) Topical two times a day  vancomycin    Solution 125 milliGRAM(s) Oral every 6 hours    MEDICATIONS  (PRN):  acetaminophen     Tablet .. 650 milliGRAM(s) Oral every 6 hours PRN Temp greater or equal to 38C (100.4F), Mild Pain (1 - 3)  ALBUTerol    90 MICROgram(s) HFA Inhaler 2 Puff(s) Inhalation every 6 hours PRN Shortness of Breath and/or Wheezing  aluminum hydroxide/magnesium hydroxide/simethicone Suspension 30 milliLiter(s) Oral every 4 hours PRN Dyspepsia  melatonin 3 milliGRAM(s) Oral at bedtime PRN Insomnia  ondansetron Injectable 4 milliGRAM(s) IV Push every 8 hours PRN Nausea and/or Vomiting  oxycodone    5 mG/acetaminophen 325 mG 2 Tablet(s) Oral every 6 hours PRN for severe pain    Allergies    morphine (Rash)    Intolerances      Social: no smoking, no alcohol, no illegal drugs; no recent travel, no exposure to TB  FAMILY HISTORY:  Family history of cardiomyopathy (Mother)       no history of premature cardiovascular disease in first degree relatives  ROS: the patient denies fever, no chills, no HA, no dizziness, no sore throat, no blurry vision, no CP, no palpitations, no SOB, no cough, no abdominal pain, no diarrhea, no N/V, no dysuria, no leg pain, no claudication, no rash, no joint aches, no rectal pain or bleeding, no night sweats  All other systems reviewed and are negative    Vital Signs Last 24 Hrs  T(C): 36.6 (16 Dec 2021 08:30), Max: 37.2 (15 Dec 2021 13:14)  T(F): 97.8 (16 Dec 2021 08:30), Max: 99 (15 Dec 2021 13:14)  HR: 100 (16 Dec 2021 12:00) (66 - 120)  BP: 139/108 (16 Dec 2021 12:00) (117/78 - 154/96)  BP(mean): 119 (16 Dec 2021 12:00) (108 - 124)  RR: 21 (16 Dec 2021 12:00) (12 - 27)  SpO2: 95% (16 Dec 2021 12:00) (94% - 100%)  Daily Height in cm: 165.1 (15 Dec 2021 13:14)    Daily     PE:    Constitutional: frail looking  HEENT: NC/AT, EOMI, PERRLA, conjunctivae clear; ears and nose atraumatic; pharynx clear  Neck: supple; thyroid not palpable  Back: no tenderness  Respiratory: respiratory effort normal; clear to auscultation  Cardiovascular: S1S2 regular, no murmurs  Abdomen: soft, not tender, not distended, positive BS; no liver or spleen organomegaly  Genitourinary: no suprapubic tenderness  Musculoskeletal: no muscle tenderness, no joint swelling or tenderness  Neurological/ Psychiatric: AxOx3, judgement and insight normal;  moving all extremities  Skin: no rashes; no palpable lesions, fibrosis secondary to track marks; tessio with mild erythema around exit site    Labs: all available labs reviewed                        12.6   10.34 )-----------( 243      ( 16 Dec 2021 06:14 )             40.5     12-16    136  |  100  |  30<H>  ----------------------------<  107<H>  3.7   |  28  |  3.96<H>    Ca    7.8<L>      16 Dec 2021 06:14    TPro  6.4  /  Alb  2.9<L>  /  TBili  0.4  /  DBili  x   /  AST  17  /  ALT  22  /  AlkPhos  57  12-16     LIVER FUNCTIONS - ( 16 Dec 2021 06:14 )  Alb: 2.9 g/dL / Pro: 6.4 gm/dL / ALK PHOS: 57 U/L / ALT: 22 U/L / AST: 17 U/L / GGT: x                 Radiology: all available radiological tests reviewed    Advanced directives addressed: full resuscitation
Neurology Consult requested by:   Patient is a 41y old  Female who presents with a chief complaint of    HPI:  40 y/o woma PMHx of bacteremia, kidney bx proven ANCA vasculitis, COPD, depression, GERD, Raynaud's syndrome, heroin abuse, HTN, lupus, RA, sepsis, SLE presents to ED s/p tonic clonic seizure today while at dialysis. Feeling woozy, admits to a previous seizure several years ago. No recurrence. Denies alcohol abuse. No episodes of confusion, blackouts.    PAST MEDICAL & SURGICAL HISTORY:  Lupus    Rheumatoid arthritis    H/O Raynaud&#x27;s syndrome    Heroin abuse    Smoker    Rheumatoid arthritis    Sepsis    HTN (hypertension)    COPD (chronic obstructive pulmonary disease)    Depression    Epilepsy    GERD (gastroesophageal reflux disease)    Raynaud&#x27;s syndrome without gangrene    Bacteremia    Systemic lupus erythematosus    Aphonia    H/O tubal ligation    H/O appendicitis    Gall bladder stones    H/O tracheostomy    S/P percutaneous endoscopic gastrostomy (PEG) tube placement      FAMILY HISTORY:  Family history of cardiomyopathy (Mother)      Social Hx:  Nonsmoker, no drug or alcohol use  Medications and Allergies ReviewedMEDICATIONS  (STANDING):     ROS: Pertinent positives in HPI, all other ROS were reviewed and are negative.      Examination:   Vital Signs Last 24 Hrs  T(C): 37 (15 Dec 2021 15:12), Max: 37.2 (15 Dec 2021 13:14)  T(F): 98.6 (15 Dec 2021 15:12), Max: 99 (15 Dec 2021 13:14)  HR: 95 (15 Dec 2021 15:12) (95 - 116)  BP: 154/96 (15 Dec 2021 15:12) (140/108 - 154/96)  BP(mean): 114 (15 Dec 2021 15:12) (114 - 114)  RR: 18 (15 Dec 2021 15:12) (18 - 18)  SpO2: 100% (15 Dec 2021 15:12) (100% - 100%)  General: Cooperative, NAD   NECK: supple, no masses  ENT: Normal hearing   Vascular : no carotid bruits,   Lungs: CTAB  Chest: RRR, no murmurs  Extremities: nontender, no edema  Musculoskeletal: left wrist brace, c/o pain left knee  Skin: no rash    Neurological Examination:  NIHSS:0  MS: AOx2. lethargic but interactive, flat affect. Speech fluent, names and repeats, follows simple commands  CN: VFFTC PERLL, EOMI, V1-3 sensation intact, face symmetric, hearing intact, palate elevates symmetrically, tongue midline, SCM equal bilaterally  Motor: normal bulk and tone, no tremor, rigidity or bradykinesia.  limited exam, poor effort, moves all against gravity, + asterixis noted, no drift of UEs, 4/5 all over   Sens: Intact to light touch.    Reflexes: 0-1/4 all over, downgoing toes b/l  Coord:  No dysmetria  Gait: Cannot test    Labs: Reviewed  Comprehensive Metabolic Panel (12.15.21 @ 13:37)   Sodium, Serum: 137 mmol/L   Potassium, Serum: 3.5 mmol/L   Chloride, Serum: 100 mmol/L   Carbon Dioxide, Serum: 31 mmol/L   Anion Gap, Serum: 6 mmol/L   Blood Urea Nitrogen, Serum: 20 mg/dL   Creatinine, Serum: 2.88 mg/dL   Glucose, Serum: 72 mg/dL   Calcium, Total Serum: 8.2 mg/dL   Protein Total, Serum: 7.5 gm/dL   Albumin, Serum: 3.5 g/dL   Bilirubin Total, Serum: 0.4 mg/dL   Alkaline Phosphatase, Serum: 63 U/L   Aspartate Aminotransferase (AST/SGOT): 13 U/L   Alanine Aminotransferase (ALT/SGPT): 18 U/L   eGFR if Non : 20:    Complete Blood Count + Automated Diff (12.15.21 @ 13:37)   WBC Count: 12.14 K/uL   RBC Count: 4.16 M/uL   Hemoglobin: 12.5 g/dL   Hematocrit: 40.3 %   Mean Cell Volume: 96.9 fl   Mean Cell Hemoglobin: 30.0 pg   Mean Cell Hemoglobin Conc: 31.0 gm/dL   Red Cell Distrib Width: 17.2 %   Platelet Count - Automated: 253 K/uL         Imaging:   < from: CT Head w/ IV Cont (12.15.21 @ 14:55) >  FINDINGS:  *  HEMORRHAGE:  No evidence of intracranial hemorrhage.  *  BRAIN PARENCHYMA:  No abnormal regions of parenchymal attenuation. No   mass or mass effect.  *  VENTRICLES / SHIFT:  No hydrocephalus. No midline shift.  *  EXTRA-AXIAL / BASAL CISTERNS:  No extra-axial mass. Basal cisterns   preserved.  *  ENHANCEMENT: No evidence of abnormal parenchymal or dural enhancement  *  CALVARIUM AND EXTRACRANIAL SOFT TISSUES:  No depressed calvarial   fracture.  *  SINUSES, ORBITS, MASTOIDS:  The visualized paranasal sinuses and   mastoid air cells are well aerated.    IMPRESSION:  NO EVIDENCE OF AN ACUTE INTRACRANIAL HEMORRHAGE, MIDLINE SHIFT, OR   HYDROCEPHALUS. NO EVIDENCE OF A MASS OR ABNORMAL ENHANCEMENT.    < end of copied text >

## 2021-12-16 NOTE — CONSULT NOTE ADULT - ASSESSMENT
40 y/o F PMx significant for MRSA bacteremia, COPD, Hypertension, ESRD on hemodialysis (MWF) (kidney bx c/w ANCA vasculitis), cocaine and heroin abuse, depression, GERD, epilepsy, Raynaud's syndrome, Rheumatoid arthritis, SLE presents admitted on 12/15 from dialysis center for evaluation of seizure witnessed; the patient denies any illicit drug use, but states she took gabapentin. Patient was receiving vancomycin post dialysis for continued treatment of MRSA sepsis, until 12/20, also on po vancomycin for CDiff.     1. Patient admitted with seizure, unclear etiology, though has had seizure in past, need to further evaluate including toxicology  - follow up cultures   - serial cbc and monitor temperature   - teso care  - will continue treatment of prior MRSA infection with daptomycin for 2 more doses at q 48 hour interval  - on po vancomycin for CDiff present on admission  - nephrology and neurology evaluations  2. other issues: per medicine

## 2021-12-16 NOTE — CONSULT NOTE ADULT - ASSESSMENT
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  on HD MWF. Recent admission for MRSA bacteremia - permcath was replaced . now presenting w seizure at end of HD . Pt feeling well prior without any aural sx    Seizure - pt states she has hx of this in past. Used to be on AED's then stopped 1 year ago     AED +  workup per Neurology    NATALIA on HD due to Crescentic GN   on HD MWF   resume HD and monitor response tomorrow    Anemia   TAMARA per protocol     d/w Dr Epperson   ** pt seen earlier , note now

## 2021-12-16 NOTE — CONSULT NOTE ADULT - NSCONSULTADDITIONALINFOA_GEN_ALL_CORE
41
EEG is abnormal, slow, with generalized epileptiform activities.  ? post ictal.  Suggest:  admit to monitored setting  neuro check q 2  fosphenytoin 800 mg IVPB bolus, then 100 mg IVPB q 8  obtain serum phenytoin level in AM  24 video EEG

## 2021-12-17 ENCOUNTER — TRANSCRIPTION ENCOUNTER (OUTPATIENT)
Age: 41
End: 2021-12-17

## 2021-12-17 LAB
ALBUMIN SERPL ELPH-MCNC: 2.6 G/DL — LOW (ref 3.3–5)
ANION GAP SERPL CALC-SCNC: 9 MMOL/L — SIGNIFICANT CHANGE UP (ref 5–17)
BUN SERPL-MCNC: 61 MG/DL — HIGH (ref 7–23)
CALCIUM SERPL-MCNC: 7.2 MG/DL — LOW (ref 8.5–10.1)
CHLORIDE SERPL-SCNC: 101 MMOL/L — SIGNIFICANT CHANGE UP (ref 96–108)
CO2 SERPL-SCNC: 27 MMOL/L — SIGNIFICANT CHANGE UP (ref 22–31)
CREAT SERPL-MCNC: 6.18 MG/DL — HIGH (ref 0.5–1.3)
GLUCOSE SERPL-MCNC: 87 MG/DL — SIGNIFICANT CHANGE UP (ref 70–99)
HCT VFR BLD CALC: 33.5 % — LOW (ref 34.5–45)
HGB BLD-MCNC: 10.5 G/DL — LOW (ref 11.5–15.5)
MCHC RBC-ENTMCNC: 30.5 PG — SIGNIFICANT CHANGE UP (ref 27–34)
MCHC RBC-ENTMCNC: 31.3 GM/DL — LOW (ref 32–36)
MCV RBC AUTO: 97.4 FL — SIGNIFICANT CHANGE UP (ref 80–100)
PHOSPHATE SERPL-MCNC: 7.7 MG/DL — HIGH (ref 2.5–4.5)
PLATELET # BLD AUTO: 214 K/UL — SIGNIFICANT CHANGE UP (ref 150–400)
POTASSIUM SERPL-MCNC: 3.8 MMOL/L — SIGNIFICANT CHANGE UP (ref 3.5–5.3)
POTASSIUM SERPL-SCNC: 3.8 MMOL/L — SIGNIFICANT CHANGE UP (ref 3.5–5.3)
RBC # BLD: 3.44 M/UL — LOW (ref 3.8–5.2)
RBC # FLD: 17 % — HIGH (ref 10.3–14.5)
SODIUM SERPL-SCNC: 137 MMOL/L — SIGNIFICANT CHANGE UP (ref 135–145)
WBC # BLD: 11.48 K/UL — HIGH (ref 3.8–10.5)
WBC # FLD AUTO: 11.48 K/UL — HIGH (ref 3.8–10.5)

## 2021-12-17 PROCEDURE — 95720 EEG PHY/QHP EA INCR W/VEEG: CPT

## 2021-12-17 PROCEDURE — 99232 SBSQ HOSP IP/OBS MODERATE 35: CPT

## 2021-12-17 RX ORDER — DAPTOMYCIN 500 MG/10ML
300 INJECTION, POWDER, LYOPHILIZED, FOR SOLUTION INTRAVENOUS
Qty: 0 | Refills: 0 | DISCHARGE
Start: 2021-12-17

## 2021-12-17 RX ORDER — LANOLIN ALCOHOL/MO/W.PET/CERES
3 CREAM (GRAM) TOPICAL AT BEDTIME
Refills: 0 | Status: DISCONTINUED | OUTPATIENT
Start: 2021-12-17 | End: 2021-12-18

## 2021-12-17 RX ADMIN — Medication 125 MILLIGRAM(S): at 17:55

## 2021-12-17 RX ADMIN — LEVETIRACETAM 1000 MILLIGRAM(S): 250 TABLET, FILM COATED ORAL at 15:26

## 2021-12-17 RX ADMIN — PANTOPRAZOLE SODIUM 40 MILLIGRAM(S): 20 TABLET, DELAYED RELEASE ORAL at 06:04

## 2021-12-17 RX ADMIN — OXYCODONE AND ACETAMINOPHEN 2 TABLET(S): 5; 325 TABLET ORAL at 00:40

## 2021-12-17 RX ADMIN — Medication 125 MILLIGRAM(S): at 05:51

## 2021-12-17 RX ADMIN — Medication 1 DROP(S): at 13:54

## 2021-12-17 RX ADMIN — Medication 125 MILLIGRAM(S): at 11:19

## 2021-12-17 RX ADMIN — Medication 1 DROP(S): at 20:27

## 2021-12-17 RX ADMIN — Medication 50 MILLIGRAM(S): at 15:27

## 2021-12-17 RX ADMIN — OXYCODONE AND ACETAMINOPHEN 2 TABLET(S): 5; 325 TABLET ORAL at 06:50

## 2021-12-17 RX ADMIN — LEVETIRACETAM 1000 MILLIGRAM(S): 250 TABLET, FILM COATED ORAL at 20:28

## 2021-12-17 RX ADMIN — OXYCODONE AND ACETAMINOPHEN 2 TABLET(S): 5; 325 TABLET ORAL at 06:31

## 2021-12-17 RX ADMIN — FOSPHENYTOIN 104 MILLIGRAM(S) PE: 50 INJECTION INTRAMUSCULAR; INTRAVENOUS at 05:50

## 2021-12-17 RX ADMIN — Medication 200 MILLIGRAM(S): at 15:26

## 2021-12-17 RX ADMIN — Medication 0.1 MILLIGRAM(S): at 13:52

## 2021-12-17 RX ADMIN — OXYCODONE AND ACETAMINOPHEN 2 TABLET(S): 5; 325 TABLET ORAL at 18:31

## 2021-12-17 RX ADMIN — Medication 1 APPLICATION(S): at 20:28

## 2021-12-17 RX ADMIN — AMLODIPINE BESYLATE 10 MILLIGRAM(S): 2.5 TABLET ORAL at 12:23

## 2021-12-17 RX ADMIN — Medication 3 MILLIGRAM(S): at 00:20

## 2021-12-17 RX ADMIN — OXYCODONE AND ACETAMINOPHEN 2 TABLET(S): 5; 325 TABLET ORAL at 12:29

## 2021-12-17 RX ADMIN — OXYCODONE AND ACETAMINOPHEN 2 TABLET(S): 5; 325 TABLET ORAL at 13:29

## 2021-12-17 RX ADMIN — Medication 1 APPLICATION(S): at 15:27

## 2021-12-17 RX ADMIN — ATOVAQUONE 1500 MILLIGRAM(S): 750 SUSPENSION ORAL at 15:26

## 2021-12-17 RX ADMIN — OXYCODONE AND ACETAMINOPHEN 2 TABLET(S): 5; 325 TABLET ORAL at 00:20

## 2021-12-17 NOTE — DISCHARGE NOTE PROVIDER - NSDCMRMEDTOKEN_GEN_ALL_CORE_FT
albuterol 90 mcg/inh inhalation aerosol: 2 puff(s) inhaled every 6 hours, As needed, Shortness of Breath and/or Wheezing  amLODIPine 10 mg oral tablet: 1 tab(s) orally once a day  atovaquone 750 mg/5 mL oral suspension: 10 milliliter(s) orally once a day  cloNIDine 0.1 mg oral tablet: 1 tab(s) orally once a day  DAPTOmycin 500 mg intravenous injection: 300 milligram(s) intravenous every 48 hours  Dilantin 100 mg oral capsule, extended release: 3 cap(s) orally once a day (at bedtime)   gabapentin 100 mg oral capsule: 1 cap(s) orally 3 times a day MDD:Maximum daily dose 300  hydroxychloroquine 200 mg oral tablet: 1 tab(s) orally once a day MDD:200 daily  Imodium A-D 2 mg oral tablet: 1 tab(s) orally every 4 hours, As Needed  melatonin 3 mg oral tablet: 1 tab(s) orally once a day (at bedtime), As needed, Insomnia  ocular lubricant ophthalmic solution: 1 drop(s) to each affected eye 2 times a day as needed for dry eyes  pantoprazole 40 mg oral delayed release tablet: 1 tab(s) orally once a day (before a meal)  predniSONE 50 mg oral tablet: 1 tab(s) orally once a day   Silvadene 1% topical cream: Apply topically to affected area 2 times a day  simethicone 80 mg oral tablet, chewable: 1 tab(s) orally 3 times a day, As needed, Gas  Tylenol 325 mg oral tablet: 2 tab(s) orally every 4 hours, As Needed  vancomycin 125 mg oral capsule: 1 cap(s) orally 4 times a day  ***COURSE NOT COMPLETE***   albuterol 90 mcg/inh inhalation aerosol: 2 puff(s) inhaled every 6 hours, As needed, Shortness of Breath and/or Wheezing  amLODIPine 10 mg oral tablet: 1 tab(s) orally once a day  atovaquone 750 mg/5 mL oral suspension: 10 milliliter(s) orally once a day  cloNIDine 0.1 mg oral tablet: 1 tab(s) orally once a day  Dilantin 100 mg oral capsule, extended release: 3 cap(s) orally once a day (at bedtime)   gabapentin 100 mg oral capsule: 1 cap(s) orally 3 times a day MDD:Maximum daily dose 300  hydrALAZINE 25 mg oral tablet: 1 tab(s) orally 3 times a day  hydroxychloroquine 200 mg oral tablet: 1 tab(s) orally once a day MDD:200 daily  Imodium A-D 2 mg oral tablet: 1 tab(s) orally every 4 hours, As Needed  melatonin 3 mg oral tablet: 1 tab(s) orally once a day (at bedtime), As needed, Insomnia  ocular lubricant ophthalmic solution: 1 drop(s) to each affected eye 2 times a day as needed for dry eyes  pantoprazole 40 mg oral delayed release tablet: 1 tab(s) orally once a day (before a meal)  predniSONE 50 mg oral tablet: 1 tab(s) orally once a day   Silvadene 1% topical cream: Apply topically to affected area 2 times a day  simethicone 80 mg oral tablet, chewable: 1 tab(s) orally 3 times a day, As needed, Gas  Tylenol 325 mg oral tablet: 2 tab(s) orally every 4 hours, As Needed  vancomycin 125 mg oral capsule: 1 cap(s) orally 4 times a day  ***COURSE NOT COMPLETE***

## 2021-12-17 NOTE — PROGRESS NOTE ADULT - SUBJECTIVE AND OBJECTIVE BOX
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  Depression, GERD, epilepsy, Raynaud's syndrome, Rheumatoid arthritis, SLE presents to the ED s/p tonic clonic seizure during end of  dialysis. The patient denies any prodrome of subjective fevers, chills, blurry vision, or recent sick contacts. The patient states that her previous seizure was several years ago with no reported recurrence for which pt was taking AED's and then stopped.  EEG today + epileptogenic     today pt awake and denies any compaints   does not recall events   denies recent drug use   seen on HD          PAST MEDICAL & SURGICAL HISTORY:  Rheumatoid arthritis    H/O Raynaud&#x27;s syndrome    Heroin abuse    Smoker    Sepsis    HTN (hypertension)    COPD (chronic obstructive pulmonary disease)    Depression    Epilepsy    GERD (gastroesophageal reflux disease)    Bacteremia    Systemic lupus erythematosus    Aphonia    H/O tubal ligation    H/O appendicitis    Gall bladder stones    H/O tracheostomy    S/P percutaneous endoscopic gastrostomy (PEG) tube placement        MEDICATIONS  (STANDING):  amLODIPine   Tablet 10 milliGRAM(s) Oral daily  artificial  tears Solution 1 Drop(s) Both EYES two times a day  atovaquone  Suspension 1500 milliGRAM(s) Oral daily  cloNIDine 0.1 milliGRAM(s) Oral daily  DAPTOmycin IVPB 300 milliGRAM(s) IV Intermittent every 48 hours  fosphenytoin IVPB 100 milliGRAM(s) PE IV Intermittent every 8 hours  gabapentin 100 milliGRAM(s) Oral three times a day  hydroxychloroquine 200 milliGRAM(s) Oral daily  levETIRAcetam 1000 milliGRAM(s) Oral two times a day  pantoprazole    Tablet 40 milliGRAM(s) Oral before breakfast  predniSONE   Tablet 50 milliGRAM(s) Oral daily  silver sulfADIAZINE 1% Cream 1 Application(s) Topical two times a day  vancomycin    Solution 125 milliGRAM(s) Oral every 6 hours      Allergies    morphine (Rash)    Intolerances        SOCIAL HISTORY:  Denies ETOh,Smoking,     FAMILY HISTORY:  Family history of cardiomyopathy (Mother)        REVIEW OF SYSTEMS:    CONSTITUTIONAL: No weakness, fevers or chills  EYES/ENT: No visual changes;  No vertigo or throat pain   NECK: No pain or stiffness  RESPIRATORY: No cough, wheezing, hemoptysis; No shortness of breath  CARDIOVASCULAR: No chest pain or palpitations  GASTROINTESTINAL: No abdominal or epigastric pain. No nausea, vomiting, or hematemesis; No diarrhea or constipation. No melena or hematochezia.  GENITOURINARY: No dysuria, frequency or hematuria  NEUROLOGICAL: No numbness or weakness  SKIN: No itching, burning, rashes, or lesions   All other review of systems is negative unless indicated above.    VITAL:  T(C): , Max: 37.4 (12-16-21 @ 14:00)  T(F): , Max: 99.3 (12-16-21 @ 14:00)  HR: 105 (12-16-21 @ 22:00)  BP: 147/105 (12-16-21 @ 22:00)  BP(mean): 114 (12-16-21 @ 22:00)  RR: 23 (12-16-21 @ 22:00)  SpO2: 97% (12-16-21 @ 22:00)  Wt(kg): --    I and O's:        PHYSICAL EXAM:    Constitutional: NAD  HEENT: PERRLA, EOMI,  MMM  Neck: No LAD, No JVD  Respiratory: CTAB  Cardiovascular: S1 and S2  Gastrointestinal: BS+, soft, NT/ND  Extremities: No peripheral edema  Neurological: A/O x 3, no focal deficits  : No Salgado  Skin: No rashes  Access: Not applicable    LABS:                        12.6   10.34 )-----------( 243      ( 16 Dec 2021 06:14 )             40.5     12-16    136  |  100  |  30<H>  ----------------------------<  107<H>  3.7   |  28  |  3.96<H>    Ca    7.8<L>      16 Dec 2021 06:14    TPro  6.4  /  Alb  2.9<L>  /  TBili  0.4  /  DBili  x   /  AST  17  /  ALT  22  /  AlkPhos  57  12-16      Urine Studies:          RADIOLOGY & ADDITIONAL STUDIES:

## 2021-12-17 NOTE — DISCHARGE NOTE PROVIDER - CARE PROVIDERS DIRECT ADDRESSES
[email or web address]

## 2021-12-17 NOTE — DISCHARGE NOTE NURSING/CASE MANAGEMENT/SOCIAL WORK - NSDCPEFALRISK_GEN_ALL_CORE
For information on Fall & Injury Prevention, visit: [email or web address] OR  [email or web address] OR  https://www.cdc.gov/steadi/patient.html

## 2021-12-17 NOTE — PROVIDER CONTACT NOTE (OTHER) - ASSESSMENT
to find 2 Percocet dampened, like patient had them in her mouth and removed them .RN tossed medication in sharps. Now at 2130 pt BP was elevated went into the room to find patient at edge of bed going through her purse, when asked what she needed and if I could help. Pt stated "NO". After I went to tell ANM regarding suspicious behavior noted, that possibly patient was attempting to take medications from her bag that were not ordered by attending. ANM notified supervision and security, who arrived to unit to assess situation. pt became more agitated and refusing to allow a search of her belongings. at this time pt states, "I'M FUCKING LEAVING!" ICU PA was contacted, and stated with pt having full capacity it is ultimately patients decision. pt contacted her brother who advised her she needed to stay in the hospital and would not come pick her up. ADM spoke with pt again and was able to conduct search with no other issues.

## 2021-12-17 NOTE — DISCHARGE NOTE PROVIDER - NSDCCPCAREPLAN_GEN_ALL_CORE_FT
PRINCIPAL DISCHARGE DIAGNOSIS  Diagnosis: Seizure  Assessment and Plan of Treatment: dilantin 300 mg at bedtime, follow up with neurology

## 2021-12-17 NOTE — DISCHARGE NOTE PROVIDER - ATTENDING DISCHARGE PHYSICAL EXAMINATION:
General: older looking female in no acute distress  Eyes: conjunctiva and sclera clear  Head: Normocephalic; atraumatic  ENMT: No nasal discharge; airway clear, poor dentition  Neck: Supple; non tender; no masses  Respiratory: No wheezes, rales or rhonchi  Cardiovascular: S1, S2 reg, sinus tachycardia  Gastrointestinal: Soft non-tender non-distended; Normal bowel sounds  Genitourinary: No costovertebral angle tenderness  Extremities: No clubbing, cyanosis, + trace BL LE edema  Vascular: Peripheral pulses palpable 2+ bilaterally  Neurological: Alert and oriented x4  Skin: Warm and dry. Bluish discoloration of all extremities with healed scars all over it from IVDA  Musculoskeletal: Normal tone  Psychiatric: Anxious General: older looking female in no acute distress  Eyes: conjunctiva and sclera clear  Head: Normocephalic; atraumatic  ENMT: No nasal discharge; airway clear, poor dentition  Neck: Supple; non tender; no masses  Respiratory: No wheezes, rales or rhonchi  Cardiovascular: S1, S2 reg, sinus tachycardia  Gastrointestinal: Soft non-tender non-distended; Normal bowel sounds  Genitourinary: No costovertebral angle tenderness  Extremities: No clubbing, cyanosis, + trace BL LE edema  Vascular: Peripheral pulses palpable 2+ bilaterally  Neurological: Alert and oriented x4  Skin: Warm and dry. Bluish discoloration of all extremities with healed scars all over it from IVDA  Musculoskeletal: Normal tone  Psychiatric: Anxious, upset, denies SI or plan, continue to deny IVDU

## 2021-12-17 NOTE — PROGRESS NOTE ADULT - ASSESSMENT
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  on HD MWF. Recent admission for MRSA bacteremia - permcath was replaced . now presenting w seizure at end of HD . Pt feeling well prior without any aural sx    Seizure - pt states she has hx of this in past. Used to be on AED's then stopped 1 year ago     AED +  workup per Neurology    NATALIA on HD due to Crescentic GN   on HD MWF   resume HD and monitor response on HD        Anemia   TAMARA per protocol     d/w Dr Epperson   ** pt seen earlier , note now

## 2021-12-17 NOTE — DISCHARGE NOTE NURSING/CASE MANAGEMENT/SOCIAL WORK - PATIENT PORTAL LINK FT
You can access the FollowMyHealth Patient Portal offered by [facility] by registering at the following website: [email or web address]. By joining [employer]’s FollowMyHealth portal, you will also be able to view your health information using other applications (apps) compatible with our system.

## 2021-12-17 NOTE — DISCHARGE NOTE PROVIDER - HOSPITAL COURSE
40 y/o Female with PMHx significant for MRSA bacteremia, COPD, Hypertension, ESRD on hemodialysis (MWF) (kidney bx c/w ANCA vasculitis), cocaine and heroin abuse, depression, GERD, epilepsy, Raynaud's syndrome, Rheumatoid arthritis, SLE presents to the ED s/p tonic clonic seizure today while at dialysis. The patient denies any prodrome of subjective fevers, chills, blurry vision, or recent sick contacts. The patient states that her previous seizure was several years ago with no reported recurrence. In the ED Neurology and CCM were consulted.    #Witnessed seizures - EEG is neg, discussed with Dr. Bustamante - will continue pt  on po dilantin 300 mg Q HS and to follow with neurology as outpt    #SLE/RA  ~cont. Hydroxychloroquine 200mg po daily  ~cont. Prednisone 50mg po daily    #COPD (compensated)  ~cont. Albuterol 2puffs q6h prn    #Hypertension  ~cont. Amlodipine 10mg po daily  ~cont. Clonidine (takes 0.1mg po daily)    #Infectious  Diarrhea with Clostridium difficile, Yersinia and E. coli  ~cont. contact isolation   ~cont. Vancomycin 125mg po q6h till 12/20  ~patient completed 3 day course of Ceftriaxone on prior admission    #Chronic pain  ~cont. Gabapentin 100mg po tid - pt is refusing as it "gave me seizures"  ~cont. Oxycodone/Acetaminophen 5-325mg po q6h prn severe pain  - reported pocketing of oxycodone on the floor as well as needle and flush in her bag  - Utox was ordered but never done, pt denies use but suspect still using    #MRSA bacteremia/IVDA  ~patient's last GRAHAM was negative for any notable vegetations   ~per ID cont. Vancomycin 500 mg IVPB HD until 12/20   ~discussed with ID Dr. Harrington    #ESRD - on HD    Medically stable for DC  Time spent 65 min 42 y/o Female with PMHx significant for MRSA bacteremia, COPD, Hypertension, ESRD on hemodialysis (MWF) (kidney bx c/w ANCA vasculitis), cocaine and heroin abuse, depression, GERD, epilepsy, Raynaud's syndrome, Rheumatoid arthritis, SLE presents to the ED s/p tonic clonic seizure today while at dialysis. The patient denies any prodrome of subjective fevers, chills, blurry vision, or recent sick contacts. The patient states that her previous seizure was several years ago with no reported recurrence. In the ED Neurology and CCM were consulted.    #Witnessed seizures - EEG is neg, discussed with Dr. Bustamante - will continue pt  on po dilantin 300 mg Q HS and to follow with neurology as outpt    #SLE/RA  ~cont. Hydroxychloroquine 200mg po daily  ~cont. Prednisone 50mg po daily    #COPD (compensated)  ~cont. Albuterol 2puffs q6h prn    #Hypertension  ~cont. Amlodipine 10mg po daily  ~cont. Clonidine (takes 0.1mg po daily)  - added hydralazine due to persistently elevated BP    #Infectious  Diarrhea with Clostridium difficile, Yersinia and E. coli  ~cont. contact isolation   ~cont. Vancomycin 125mg po q6h till 12/20  ~patient completed 3 day course of Ceftriaxone on prior admission    #Chronic pain  ~cont. Gabapentin 100mg po tid - pt is refusing as it "gave me seizures"  ~cont. Oxycodone/Acetaminophen 5-325mg po q6h prn severe pain  - reported pocketing of oxycodone on the floor as well as needle and flush in her bag  - Utox was ordered but never done, pt denies use but suspect still using    #MRSA bacteremia/IVDA  ~patient's last GRAHAM was negative for any notable vegetations   ~per ID cont. Vancomycin 500 mg IVPB HD until 12/20   ~discussed with ID Dr. Harrington    #ESRD - on HD    Medically stable for DC  Time spent 65 min    P.S. Brother brought up suicidal ideation which pt denies and very upset now - no CI to DC noted, cleared by psychiatry

## 2021-12-17 NOTE — DISCHARGE NOTE PROVIDER - CARE PROVIDER_API CALL
Blake Pena)  Internal Medicine; Nephrology  [address] [place] [address]  Phone: [phone]  Fax: [phone]  Follow Up Time:     PORFIRIO CRISTOBAL  Internal Medicine  [address] [place] [address]  Phone: ()-  Fax: ()-  Follow Up Time:

## 2021-12-17 NOTE — PROVIDER CONTACT NOTE (OTHER) - SITUATION
Pt zana at 1930 from Day [facility], explained that patient was given Percocet 10/650mg, was suspicious that patient did not take her medication. At this time patient was moved into another room due to
called  md service spoke with Emir
Office is aware that patient is in [facility].  Please fax discharge papers to [phone].

## 2021-12-17 NOTE — PROGRESS NOTE ADULT - SUBJECTIVE AND OBJECTIVE BOX
HPI:  42 y/o F PMx significant for MRSA bacteremia, COPD, Hypertension, ESRD on hemodialysis (MWF) (kidney bx c/w ANCA vasculitis), cocaine and heroin abuse, depression, GERD, epilepsy, Raynaud's syndrome, Rheumatoid arthritis, SLE presents to the ED s/p tonic clonic seizure today while at dialysis. No other since admitted, feeling better. Placed on dilantin 100 mg TID. For dialysis at bedside today.    MEDICATIONS  (STANDING):  amLODIPine   Tablet 10 milliGRAM(s) Oral daily  artificial  tears Solution 1 Drop(s) Both EYES two times a day  atovaquone  Suspension 1500 milliGRAM(s) Oral daily  cloNIDine 0.1 milliGRAM(s) Oral daily  DAPTOmycin IVPB 300 milliGRAM(s) IV Intermittent every 48 hours  fosphenytoin IVPB 100 milliGRAM(s) PE IV Intermittent every 8 hours  gabapentin 100 milliGRAM(s) Oral three times a day  hydroxychloroquine 200 milliGRAM(s) Oral daily  influenza   Vaccine 0.5 milliLiter(s) IntraMuscular once  levETIRAcetam 1000 milliGRAM(s) Oral two times a day  pantoprazole    Tablet 40 milliGRAM(s) Oral before breakfast  predniSONE   Tablet 50 milliGRAM(s) Oral daily  silver sulfADIAZINE 1% Cream 1 Application(s) Topical two times a day  vancomycin    Solution 125 milliGRAM(s) Oral every 6 hours    MEDICATIONS  (PRN):  acetaminophen     Tablet .. 650 milliGRAM(s) Oral every 6 hours PRN Temp greater or equal to 38C (100.4F), Mild Pain (1 - 3)  ALBUTerol    90 MICROgram(s) HFA Inhaler 2 Puff(s) Inhalation every 6 hours PRN Shortness of Breath and/or Wheezing  aluminum hydroxide/magnesium hydroxide/simethicone Suspension 30 milliLiter(s) Oral every 4 hours PRN Dyspepsia  melatonin 3 milliGRAM(s) Oral at bedtime PRN Insomnia  ondansetron Injectable 4 milliGRAM(s) IV Push every 8 hours PRN Nausea and/or Vomiting  oxycodone    5 mG/acetaminophen 325 mG 2 Tablet(s) Oral every 6 hours PRN for severe pain      Vital Signs Last 24 Hrs  T(C): 36.1 (17 Dec 2021 09:06), Max: 37.4 (16 Dec 2021 14:00)  T(F): 97 (17 Dec 2021 09:06), Max: 99.3 (16 Dec 2021 14:00)  HR: 104 (17 Dec 2021 08:00) (86 - 120)  BP: 143/96 (17 Dec 2021 08:00) (120/87 - 147/105)  BP(mean): 111 (17 Dec 2021 08:00) (98 - 124)  RR: 19 (17 Dec 2021 08:00) (15 - 27)  SpO2: 100% (17 Dec 2021 08:00) (95% - 100%)  Neurological Exam:  HF: Patient is alert and oriented x 3. Conversant, follows commands.  CN: Pupils are equal and reactive. Extra ocular muscles are intact. There is no facial droop or asymmetry. Tongue is midline. Sensation is intact in the face. Other CN II-XII are intact.   Motor: motor examination all muscles are 5/5 and there is no pronator drift.   Sensory: intact to  touch.   DTR: 0/4 all 4 extremities. Babinski is negative bilateral.  Co-ord:  Finger to finger to nose is intact.

## 2021-12-17 NOTE — PROGRESS NOTE ADULT - ASSESSMENT
41 year old woman hx of seizures, none for 8 years till day of admission. GTC while on dialysis. none since, doing better.  video EEG is negative for seizure activity.  Suggest:  continue dilantin, can switch to 300 mg po HS  f/u  as outpatient, 2-3 weeks

## 2021-12-18 VITALS — TEMPERATURE: 97 F

## 2021-12-18 DIAGNOSIS — F19.20 OTHER PSYCHOACTIVE SUBSTANCE DEPENDENCE, UNCOMPLICATED: ICD-10-CM

## 2021-12-18 DIAGNOSIS — F19.94 OTHER PSYCHOACTIVE SUBSTANCE USE, UNSPECIFIED WITH PSYCHOACTIVE SUBSTANCE-INDUCED MOOD DISORDER: ICD-10-CM

## 2021-12-18 PROCEDURE — 99239 HOSP IP/OBS DSCHRG MGMT >30: CPT

## 2021-12-18 PROCEDURE — 90792 PSYCH DIAG EVAL W/MED SRVCS: CPT

## 2021-12-18 RX ORDER — HYDRALAZINE HCL 50 MG
25 TABLET ORAL THREE TIMES A DAY
Refills: 0 | Status: DISCONTINUED | OUTPATIENT
Start: 2021-12-18 | End: 2021-12-18

## 2021-12-18 RX ORDER — HYDRALAZINE HCL 50 MG
1 TABLET ORAL
Qty: 90 | Refills: 0
Start: 2021-12-18 | End: 2022-01-16

## 2021-12-18 RX ADMIN — Medication 1 DROP(S): at 09:20

## 2021-12-18 RX ADMIN — OXYCODONE AND ACETAMINOPHEN 2 TABLET(S): 5; 325 TABLET ORAL at 09:09

## 2021-12-18 RX ADMIN — Medication 125 MILLIGRAM(S): at 00:48

## 2021-12-18 RX ADMIN — Medication 25 MILLIGRAM(S): at 11:40

## 2021-12-18 RX ADMIN — Medication 0.1 MILLIGRAM(S): at 09:09

## 2021-12-18 RX ADMIN — Medication 50 MILLIGRAM(S): at 09:10

## 2021-12-18 RX ADMIN — PANTOPRAZOLE SODIUM 40 MILLIGRAM(S): 20 TABLET, DELAYED RELEASE ORAL at 06:00

## 2021-12-18 RX ADMIN — AMLODIPINE BESYLATE 10 MILLIGRAM(S): 2.5 TABLET ORAL at 09:09

## 2021-12-18 RX ADMIN — Medication 1 APPLICATION(S): at 09:19

## 2021-12-18 RX ADMIN — LEVETIRACETAM 1000 MILLIGRAM(S): 250 TABLET, FILM COATED ORAL at 09:10

## 2021-12-18 RX ADMIN — Medication 125 MILLIGRAM(S): at 06:10

## 2021-12-18 RX ADMIN — OXYCODONE AND ACETAMINOPHEN 2 TABLET(S): 5; 325 TABLET ORAL at 15:02

## 2021-12-18 RX ADMIN — OXYCODONE AND ACETAMINOPHEN 2 TABLET(S): 5; 325 TABLET ORAL at 02:48

## 2021-12-18 RX ADMIN — Medication 200 MILLIGRAM(S): at 09:10

## 2021-12-18 RX ADMIN — ATOVAQUONE 1500 MILLIGRAM(S): 750 SUSPENSION ORAL at 09:09

## 2021-12-18 RX ADMIN — Medication 125 MILLIGRAM(S): at 15:03

## 2021-12-18 RX ADMIN — OXYCODONE AND ACETAMINOPHEN 2 TABLET(S): 5; 325 TABLET ORAL at 09:45

## 2021-12-18 NOTE — BEHAVIORAL HEALTH ASSESSMENT NOTE - HPI (INCLUDE ILLNESS QUALITY, SEVERITY, DURATION, TIMING, CONTEXT, MODIFYING FACTORS, ASSOCIATED SIGNS AND SYMPTOMS)
40 yo swf, lives with room mate Jace in grandmothers house, facing eviction in 8 days, no formal PPH, tx, psych admissions, suicide attempt reported h/o polysubstance dependence Opiates cocaine PMH MRSA bacteremia, COPD, Hypertension, ESRD on hemodialysis (MWF) (kidney bx c/w ANCA vasculitis),   GERD, epilepsy, Raynaud's syndrome, Rheumatoid arthritis, admitted to  for seizure referred to psych after room mate called saying Pt is suicidal.  Pt reports no prior psych h/o and has "never" been suicidal in her life.  Pt admits to depression in context of being evicted and admits to using drugs for depressed mood.  Pt denies heroin use since OD last year which left her vented/trached and protracted medical admission.  Pt states it was her OD which helped her cope and stop heroin.  Pt tox however was positive For Opiates and THC when last tested in Nov which indicated she has not been sober from Opiates for one year.  Pt denies SI/HI/AH.  She reports she is an only child and has little contact with father who lives [place] of [place].  Contacted Blake for collateral who reports Pt "is suicidal" because all she does is heroin in her room and he had to Narcan her twice.  Spoke with  who she is  from who speaks with Pt from time to time but has not been in close contact for over one year.   denies h/o suicide attempt or SIB and claims her primary problem is Heroin addiction.  Pt declined need for referral, denies SI/HI/AH/VH and no evidence of psychosis or alberto and is cleared for discharge as she does not meet criteria for involuntary psych admission.

## 2021-12-18 NOTE — BEHAVIORAL HEALTH ASSESSMENT NOTE - NSBHREFERDETAILS_PSY_A_CORE_FT
40 y/o F PMx significant for MRSA bacteremia, COPD, Hypertension, ESRD on hemodialysis (MWF) (kidney bx c/w ANCA vasculitis), cocaine and heroin abuse, depression, GERD, epilepsy, Raynaud's syndrome, Rheumatoid arthritis, SLE presents to the ED s/p tonic clonic seizure today while at dialysis. The patient denies any prodrome of subjective fevers, chills, blurry vision, or recent sick contacts. The patient states that her previous seizure was several years ago with no reported recurrence. In the ED Neurology and CCM were consulted.  psych consult requested after Pt room mate, Blake reported Pt was suicidal.

## 2021-12-18 NOTE — BEHAVIORAL HEALTH ASSESSMENT NOTE - DESCRIPTION (FIRST USE, LAST USE, QUANTITY, FREQUENCY, DURATION)
denies use in 1 yr however last tox pos for Opiates in Nov of this year recent use last tox in Nov pos bing stephenson

## 2021-12-18 NOTE — BEHAVIORAL HEALTH ASSESSMENT NOTE - NS ED BHA BENZODIAZEPINES
How Severe Is Your Acne?: moderate Is This A New Presentation, Or A Follow-Up?: Follow Up Acne Additional Comments (Use Complete Sentences): He was a 4/10 on his last visit and he is better than that today.  He says the cream made the areas really dry and irritated so he stopped. None known

## 2021-12-20 LAB
CULTURE RESULTS: SIGNIFICANT CHANGE UP
CULTURE RESULTS: SIGNIFICANT CHANGE UP
LEVETIRACETAM SERPL-MCNC: 26 UG/ML — SIGNIFICANT CHANGE UP (ref 10–40)
SPECIMEN SOURCE: SIGNIFICANT CHANGE UP
SPECIMEN SOURCE: SIGNIFICANT CHANGE UP

## 2021-12-27 DIAGNOSIS — M32.9 SYSTEMIC LUPUS ERYTHEMATOSUS, UNSPECIFIED: ICD-10-CM

## 2021-12-27 DIAGNOSIS — A04.4 OTHER INTESTINAL ESCHERICHIA COLI INFECTIONS: ICD-10-CM

## 2021-12-27 DIAGNOSIS — Z79.52 LONG TERM (CURRENT) USE OF SYSTEMIC STEROIDS: ICD-10-CM

## 2021-12-27 DIAGNOSIS — J44.9 CHRONIC OBSTRUCTIVE PULMONARY DISEASE, UNSPECIFIED: ICD-10-CM

## 2021-12-27 DIAGNOSIS — Z79.51 LONG TERM (CURRENT) USE OF INHALED STEROIDS: ICD-10-CM

## 2021-12-27 DIAGNOSIS — G89.29 OTHER CHRONIC PAIN: ICD-10-CM

## 2021-12-27 DIAGNOSIS — Z87.891 PERSONAL HISTORY OF NICOTINE DEPENDENCE: ICD-10-CM

## 2021-12-27 DIAGNOSIS — F32.A DEPRESSION, UNSPECIFIED: ICD-10-CM

## 2021-12-27 DIAGNOSIS — I73.00 RAYNAUD'S SYNDROME WITHOUT GANGRENE: ICD-10-CM

## 2021-12-27 DIAGNOSIS — I12.0 HYPERTENSIVE CHRONIC KIDNEY DISEASE WITH STAGE 5 CHRONIC KIDNEY DISEASE OR END STAGE RENAL DISEASE: ICD-10-CM

## 2021-12-27 DIAGNOSIS — G40.909 EPILEPSY, UNSPECIFIED, NOT INTRACTABLE, WITHOUT STATUS EPILEPTICUS: ICD-10-CM

## 2021-12-27 DIAGNOSIS — I77.89 OTHER SPECIFIED DISORDERS OF ARTERIES AND ARTERIOLES: ICD-10-CM

## 2021-12-27 DIAGNOSIS — A41.02 SEPSIS DUE TO METHICILLIN RESISTANT STAPHYLOCOCCUS AUREUS: ICD-10-CM

## 2021-12-27 DIAGNOSIS — A04.72 ENTEROCOLITIS DUE TO CLOSTRIDIUM DIFFICILE, NOT SPECIFIED AS RECURRENT: ICD-10-CM

## 2021-12-27 DIAGNOSIS — Z99.2 DEPENDENCE ON RENAL DIALYSIS: ICD-10-CM

## 2021-12-27 DIAGNOSIS — M06.9 RHEUMATOID ARTHRITIS, UNSPECIFIED: ICD-10-CM

## 2021-12-27 DIAGNOSIS — D64.9 ANEMIA, UNSPECIFIED: ICD-10-CM

## 2021-12-27 DIAGNOSIS — N18.6 END STAGE RENAL DISEASE: ICD-10-CM

## 2021-12-27 DIAGNOSIS — Z88.5 ALLERGY STATUS TO NARCOTIC AGENT: ICD-10-CM

## 2021-12-27 DIAGNOSIS — N17.9 ACUTE KIDNEY FAILURE, UNSPECIFIED: ICD-10-CM

## 2022-01-18 ENCOUNTER — INPATIENT (INPATIENT)
Facility: HOSPITAL | Age: 42
LOS: 14 days | Discharge: HOME HEALTH | DRG: 710 | End: 2022-02-02
Attending: INTERNAL MEDICINE | Admitting: FAMILY MEDICINE
Payer: MEDICAID

## 2022-01-18 VITALS — HEART RATE: 122 BPM | HEIGHT: 65 IN | TEMPERATURE: 97 F

## 2022-01-18 DIAGNOSIS — A41.9 SEPSIS, UNSPECIFIED ORGANISM: ICD-10-CM

## 2022-01-18 DIAGNOSIS — Z98.51 TUBAL LIGATION STATUS: Chronic | ICD-10-CM

## 2022-01-18 DIAGNOSIS — K80.20 CALCULUS OF GALLBLADDER WITHOUT CHOLECYSTITIS WITHOUT OBSTRUCTION: Chronic | ICD-10-CM

## 2022-01-18 DIAGNOSIS — Z87.19 PERSONAL HISTORY OF OTHER DISEASES OF THE DIGESTIVE SYSTEM: Chronic | ICD-10-CM

## 2022-01-18 LAB
ADD ON TEST-SPECIMEN IN LAB: SIGNIFICANT CHANGE UP
ALBUMIN SERPL ELPH-MCNC: 2.9 G/DL — LOW (ref 3.3–5)
ALP SERPL-CCNC: 162 U/L — HIGH (ref 40–120)
ALT FLD-CCNC: 19 U/L — SIGNIFICANT CHANGE UP (ref 12–78)
ANION GAP SERPL CALC-SCNC: 15 MMOL/L — SIGNIFICANT CHANGE UP (ref 5–17)
APAP SERPL-MCNC: <2 UG/ML — LOW (ref 10–30)
APPEARANCE UR: ABNORMAL
APTT BLD: 39.9 SEC — HIGH (ref 27.5–35.5)
AST SERPL-CCNC: 24 U/L — SIGNIFICANT CHANGE UP (ref 15–37)
BASE EXCESS BLDA CALC-SCNC: 0.4 MMOL/L — SIGNIFICANT CHANGE UP (ref -2–3)
BASOPHILS # BLD AUTO: 0.16 K/UL — SIGNIFICANT CHANGE UP (ref 0–0.2)
BASOPHILS NFR BLD AUTO: 0.5 % — SIGNIFICANT CHANGE UP (ref 0–2)
BILIRUB SERPL-MCNC: 0.5 MG/DL — SIGNIFICANT CHANGE UP (ref 0.2–1.2)
BILIRUB UR-MCNC: NEGATIVE — SIGNIFICANT CHANGE UP
BLOOD GAS COMMENTS ARTERIAL: SIGNIFICANT CHANGE UP
BUN SERPL-MCNC: 72 MG/DL — HIGH (ref 7–23)
CALCIUM SERPL-MCNC: 9.4 MG/DL — SIGNIFICANT CHANGE UP (ref 8.5–10.1)
CHLORIDE SERPL-SCNC: 92 MMOL/L — LOW (ref 96–108)
CK SERPL-CCNC: 39 U/L — SIGNIFICANT CHANGE UP (ref 26–192)
CO2 BLDA-SCNC: 24 MMOL/L — SIGNIFICANT CHANGE UP (ref 19–24)
CO2 SERPL-SCNC: 23 MMOL/L — SIGNIFICANT CHANGE UP (ref 22–31)
COLOR SPEC: YELLOW — SIGNIFICANT CHANGE UP
CREAT SERPL-MCNC: 9.36 MG/DL — HIGH (ref 0.5–1.3)
DIFF PNL FLD: ABNORMAL
EOSINOPHIL # BLD AUTO: 0.02 K/UL — SIGNIFICANT CHANGE UP (ref 0–0.5)
EOSINOPHIL NFR BLD AUTO: 0.1 % — SIGNIFICANT CHANGE UP (ref 0–6)
ETHANOL SERPL-MCNC: <10 MG/DL — SIGNIFICANT CHANGE UP (ref 0–10)
GLUCOSE SERPL-MCNC: 84 MG/DL — SIGNIFICANT CHANGE UP (ref 70–99)
GLUCOSE UR QL: NEGATIVE — SIGNIFICANT CHANGE UP
HCG SERPL-ACNC: 2 MIU/ML — SIGNIFICANT CHANGE UP
HCO3 BLDA-SCNC: 23 MMOL/L — SIGNIFICANT CHANGE UP (ref 21–28)
HCT VFR BLD CALC: 40.7 % — SIGNIFICANT CHANGE UP (ref 34.5–45)
HGB BLD-MCNC: 13.2 G/DL — SIGNIFICANT CHANGE UP (ref 11.5–15.5)
IMM GRANULOCYTES NFR BLD AUTO: 1.5 % — SIGNIFICANT CHANGE UP (ref 0–1.5)
INR BLD: 1.3 RATIO — HIGH (ref 0.88–1.16)
KETONES UR-MCNC: ABNORMAL
LACTATE SERPL-SCNC: 5.7 MMOL/L — CRITICAL HIGH (ref 0.7–2)
LEUKOCYTE ESTERASE UR-ACNC: ABNORMAL
LYMPHOCYTES # BLD AUTO: 0.82 K/UL — LOW (ref 1–3.3)
LYMPHOCYTES # BLD AUTO: 2.8 % — LOW (ref 13–44)
MAGNESIUM SERPL-MCNC: 3.6 MG/DL — HIGH (ref 1.6–2.6)
MCHC RBC-ENTMCNC: 29.1 PG — SIGNIFICANT CHANGE UP (ref 27–34)
MCHC RBC-ENTMCNC: 32.4 GM/DL — SIGNIFICANT CHANGE UP (ref 32–36)
MCV RBC AUTO: 89.6 FL — SIGNIFICANT CHANGE UP (ref 80–100)
MONOCYTES # BLD AUTO: 1.72 K/UL — HIGH (ref 0–0.9)
MONOCYTES NFR BLD AUTO: 5.8 % — SIGNIFICANT CHANGE UP (ref 2–14)
NEUTROPHILS # BLD AUTO: 26.53 K/UL — HIGH (ref 1.8–7.4)
NEUTROPHILS NFR BLD AUTO: 89.3 % — HIGH (ref 43–77)
NITRITE UR-MCNC: NEGATIVE — SIGNIFICANT CHANGE UP
NT-PROBNP SERPL-SCNC: HIGH PG/ML (ref 0–125)
PCO2 BLDA: 29 MMHG — LOW (ref 32–35)
PCP SPEC-MCNC: SIGNIFICANT CHANGE UP
PH BLDA: 7.5 — HIGH (ref 7.35–7.45)
PH UR: 7 — SIGNIFICANT CHANGE UP (ref 5–8)
PHENYTOIN FREE SERPL-MCNC: 1.6 UG/ML — LOW (ref 10–20)
PLATELET # BLD AUTO: 183 K/UL — SIGNIFICANT CHANGE UP (ref 150–400)
PO2 BLDA: 101 MMHG — SIGNIFICANT CHANGE UP (ref 83–108)
POTASSIUM SERPL-MCNC: 5.1 MMOL/L — SIGNIFICANT CHANGE UP (ref 3.5–5.3)
POTASSIUM SERPL-SCNC: 5.1 MMOL/L — SIGNIFICANT CHANGE UP (ref 3.5–5.3)
PROT SERPL-MCNC: 8.5 GM/DL — HIGH (ref 6–8.3)
PROT UR-MCNC: 500 MG/DL
PROTHROM AB SERPL-ACNC: 14.9 SEC — HIGH (ref 10.6–13.6)
RAPID RVP RESULT: SIGNIFICANT CHANGE UP
RBC # BLD: 4.54 M/UL — SIGNIFICANT CHANGE UP (ref 3.8–5.2)
RBC # FLD: 14.8 % — HIGH (ref 10.3–14.5)
SALICYLATES SERPL-MCNC: 1.7 MG/DL — LOW (ref 2.8–20)
SAO2 % BLDA: 100 % — SIGNIFICANT CHANGE UP
SARS-COV-2 RNA SPEC QL NAA+PROBE: SIGNIFICANT CHANGE UP
SODIUM SERPL-SCNC: 130 MMOL/L — LOW (ref 135–145)
SP GR SPEC: 1.01 — SIGNIFICANT CHANGE UP (ref 1.01–1.02)
TROPONIN I, HIGH SENSITIVITY RESULT: 286.07 NG/L — HIGH
TSH SERPL-MCNC: 2.28 UU/ML — SIGNIFICANT CHANGE UP (ref 0.34–4.82)
UROBILINOGEN FLD QL: NEGATIVE — SIGNIFICANT CHANGE UP
WBC # BLD: 29.7 K/UL — HIGH (ref 3.8–10.5)
WBC # FLD AUTO: 29.7 K/UL — HIGH (ref 3.8–10.5)

## 2022-01-18 PROCEDURE — 82140 ASSAY OF AMMONIA: CPT

## 2022-01-18 PROCEDURE — P9047: CPT | Mod: JG

## 2022-01-18 PROCEDURE — 85027 COMPLETE CBC AUTOMATED: CPT

## 2022-01-18 PROCEDURE — 99223 1ST HOSP IP/OBS HIGH 75: CPT

## 2022-01-18 PROCEDURE — 84157 ASSAY OF PROTEIN OTHER: CPT

## 2022-01-18 PROCEDURE — 94003 VENT MGMT INPAT SUBQ DAY: CPT

## 2022-01-18 PROCEDURE — 87493 C DIFF AMPLIFIED PROBE: CPT

## 2022-01-18 PROCEDURE — U0005: CPT

## 2022-01-18 PROCEDURE — 97162 PT EVAL MOD COMPLEX 30 MIN: CPT | Mod: GP

## 2022-01-18 PROCEDURE — 36430 TRANSFUSION BLD/BLD COMPNT: CPT

## 2022-01-18 PROCEDURE — 80048 BASIC METABOLIC PNL TOTAL CA: CPT

## 2022-01-18 PROCEDURE — 80069 RENAL FUNCTION PANEL: CPT

## 2022-01-18 PROCEDURE — 87529 HSV DNA AMP PROBE: CPT

## 2022-01-18 PROCEDURE — 82962 GLUCOSE BLOOD TEST: CPT

## 2022-01-18 PROCEDURE — 86592 SYPHILIS TEST NON-TREP QUAL: CPT

## 2022-01-18 PROCEDURE — 82945 GLUCOSE OTHER FLUID: CPT

## 2022-01-18 PROCEDURE — 95714 VEEG EA 12-26 HR UNMNTR: CPT

## 2022-01-18 PROCEDURE — 85025 COMPLETE CBC W/AUTO DIFF WBC: CPT

## 2022-01-18 PROCEDURE — 83735 ASSAY OF MAGNESIUM: CPT

## 2022-01-18 PROCEDURE — 82272 OCCULT BLD FECES 1-3 TESTS: CPT

## 2022-01-18 PROCEDURE — 95700 EEG CONT REC W/VID EEG TECH: CPT

## 2022-01-18 PROCEDURE — P9016: CPT

## 2022-01-18 PROCEDURE — 87102 FUNGUS ISOLATION CULTURE: CPT

## 2022-01-18 PROCEDURE — 97116 GAIT TRAINING THERAPY: CPT | Mod: GP

## 2022-01-18 PROCEDURE — 99261: CPT

## 2022-01-18 PROCEDURE — 86850 RBC ANTIBODY SCREEN: CPT

## 2022-01-18 PROCEDURE — 99291 CRITICAL CARE FIRST HOUR: CPT

## 2022-01-18 PROCEDURE — 74176 CT ABD & PELVIS W/O CONTRAST: CPT

## 2022-01-18 PROCEDURE — 74176 CT ABD & PELVIS W/O CONTRAST: CPT | Mod: 26,MA

## 2022-01-18 PROCEDURE — 93005 ELECTROCARDIOGRAM TRACING: CPT

## 2022-01-18 PROCEDURE — 87483 CNS DNA AMP PROBE TYPE 12-25: CPT

## 2022-01-18 PROCEDURE — 86923 COMPATIBILITY TEST ELECTRIC: CPT

## 2022-01-18 PROCEDURE — 86900 BLOOD TYPING SEROLOGIC ABO: CPT

## 2022-01-18 PROCEDURE — 36415 COLL VENOUS BLD VENIPUNCTURE: CPT

## 2022-01-18 PROCEDURE — 71275 CT ANGIOGRAPHY CHEST: CPT

## 2022-01-18 PROCEDURE — 86901 BLOOD TYPING SEROLOGIC RH(D): CPT

## 2022-01-18 PROCEDURE — 36600 WITHDRAWAL OF ARTERIAL BLOOD: CPT

## 2022-01-18 PROCEDURE — 87070 CULTURE OTHR SPECIMN AEROBIC: CPT

## 2022-01-18 PROCEDURE — 93308 TTE F-UP OR LMTD: CPT

## 2022-01-18 PROCEDURE — 71250 CT THORAX DX C-: CPT | Mod: 26,MA

## 2022-01-18 PROCEDURE — 83605 ASSAY OF LACTIC ACID: CPT

## 2022-01-18 PROCEDURE — 87116 MYCOBACTERIA CULTURE: CPT

## 2022-01-18 PROCEDURE — 70450 CT HEAD/BRAIN W/O DYE: CPT | Mod: 26,MA

## 2022-01-18 PROCEDURE — 84100 ASSAY OF PHOSPHORUS: CPT

## 2022-01-18 PROCEDURE — 89051 BODY FLUID CELL COUNT: CPT

## 2022-01-18 PROCEDURE — 71045 X-RAY EXAM CHEST 1 VIEW: CPT

## 2022-01-18 PROCEDURE — U0003: CPT

## 2022-01-18 PROCEDURE — 82040 ASSAY OF SERUM ALBUMIN: CPT

## 2022-01-18 PROCEDURE — 72125 CT NECK SPINE W/O DYE: CPT | Mod: 26,MA

## 2022-01-18 PROCEDURE — 93010 ELECTROCARDIOGRAM REPORT: CPT

## 2022-01-18 PROCEDURE — 93306 TTE W/DOPPLER COMPLETE: CPT

## 2022-01-18 PROCEDURE — 71045 X-RAY EXAM CHEST 1 VIEW: CPT | Mod: 26

## 2022-01-18 PROCEDURE — 94002 VENT MGMT INPAT INIT DAY: CPT

## 2022-01-18 PROCEDURE — C9113: CPT

## 2022-01-18 PROCEDURE — 82803 BLOOD GASES ANY COMBINATION: CPT

## 2022-01-18 PROCEDURE — 80185 ASSAY OF PHENYTOIN TOTAL: CPT

## 2022-01-18 RX ORDER — ALBUTEROL 90 UG/1
2 AEROSOL, METERED ORAL EVERY 6 HOURS
Refills: 0 | Status: DISCONTINUED | OUTPATIENT
Start: 2022-01-18 | End: 2022-02-02

## 2022-01-18 RX ORDER — LANOLIN ALCOHOL/MO/W.PET/CERES
3 CREAM (GRAM) TOPICAL AT BEDTIME
Refills: 0 | Status: DISCONTINUED | OUTPATIENT
Start: 2022-01-18 | End: 2022-02-02

## 2022-01-18 RX ORDER — PANTOPRAZOLE SODIUM 20 MG/1
40 TABLET, DELAYED RELEASE ORAL
Refills: 0 | Status: DISCONTINUED | OUTPATIENT
Start: 2022-01-18 | End: 2022-01-21

## 2022-01-18 RX ORDER — DEXTROSE 50 % IN WATER 50 %
50 SYRINGE (ML) INTRAVENOUS ONCE
Refills: 0 | Status: COMPLETED | OUTPATIENT
Start: 2022-01-18 | End: 2022-01-18

## 2022-01-18 RX ORDER — PIPERACILLIN AND TAZOBACTAM 4; .5 G/20ML; G/20ML
3.38 INJECTION, POWDER, LYOPHILIZED, FOR SOLUTION INTRAVENOUS ONCE
Refills: 0 | Status: COMPLETED | OUTPATIENT
Start: 2022-01-18 | End: 2022-01-18

## 2022-01-18 RX ORDER — ATOVAQUONE 750 MG/5ML
1500 SUSPENSION ORAL DAILY
Refills: 0 | Status: DISCONTINUED | OUTPATIENT
Start: 2022-01-18 | End: 2022-02-02

## 2022-01-18 RX ORDER — PIPERACILLIN AND TAZOBACTAM 4; .5 G/20ML; G/20ML
3.38 INJECTION, POWDER, LYOPHILIZED, FOR SOLUTION INTRAVENOUS EVERY 12 HOURS
Refills: 0 | Status: DISCONTINUED | OUTPATIENT
Start: 2022-01-18 | End: 2022-01-26

## 2022-01-18 RX ORDER — SIMETHICONE 80 MG/1
80 TABLET, CHEWABLE ORAL THREE TIMES A DAY
Refills: 0 | Status: DISCONTINUED | OUTPATIENT
Start: 2022-01-18 | End: 2022-02-02

## 2022-01-18 RX ORDER — ACETAMINOPHEN 500 MG
650 TABLET ORAL EVERY 6 HOURS
Refills: 0 | Status: DISCONTINUED | OUTPATIENT
Start: 2022-01-18 | End: 2022-01-21

## 2022-01-18 RX ORDER — ONDANSETRON 8 MG/1
4 TABLET, FILM COATED ORAL EVERY 8 HOURS
Refills: 0 | Status: DISCONTINUED | OUTPATIENT
Start: 2022-01-18 | End: 2022-02-02

## 2022-01-18 RX ORDER — AMLODIPINE BESYLATE 2.5 MG/1
10 TABLET ORAL DAILY
Refills: 0 | Status: DISCONTINUED | OUTPATIENT
Start: 2022-01-18 | End: 2022-01-21

## 2022-01-18 RX ORDER — HYDROXYCHLOROQUINE SULFATE 200 MG
200 TABLET ORAL DAILY
Refills: 0 | Status: DISCONTINUED | OUTPATIENT
Start: 2022-01-18 | End: 2022-02-02

## 2022-01-18 RX ORDER — VANCOMYCIN HCL 1 G
750 VIAL (EA) INTRAVENOUS ONCE
Refills: 0 | Status: COMPLETED | OUTPATIENT
Start: 2022-01-18 | End: 2022-01-18

## 2022-01-18 RX ORDER — HEPARIN SODIUM 5000 [USP'U]/ML
5000 INJECTION INTRAVENOUS; SUBCUTANEOUS EVERY 12 HOURS
Refills: 0 | Status: DISCONTINUED | OUTPATIENT
Start: 2022-01-18 | End: 2022-01-19

## 2022-01-18 RX ORDER — HYDRALAZINE HCL 50 MG
25 TABLET ORAL THREE TIMES A DAY
Refills: 0 | Status: DISCONTINUED | OUTPATIENT
Start: 2022-01-18 | End: 2022-01-19

## 2022-01-18 RX ORDER — SODIUM CHLORIDE 9 MG/ML
1000 INJECTION INTRAMUSCULAR; INTRAVENOUS; SUBCUTANEOUS ONCE
Refills: 0 | Status: COMPLETED | OUTPATIENT
Start: 2022-01-18 | End: 2022-01-18

## 2022-01-18 RX ADMIN — PIPERACILLIN AND TAZOBACTAM 200 GRAM(S): 4; .5 INJECTION, POWDER, LYOPHILIZED, FOR SOLUTION INTRAVENOUS at 17:22

## 2022-01-18 RX ADMIN — Medication 250 MILLIGRAM(S): at 18:50

## 2022-01-18 RX ADMIN — SODIUM CHLORIDE 1000 MILLILITER(S): 9 INJECTION INTRAMUSCULAR; INTRAVENOUS; SUBCUTANEOUS at 16:19

## 2022-01-18 RX ADMIN — Medication 50 MILLILITER(S): at 15:00

## 2022-01-18 NOTE — H&P ADULT - ASSESSMENT
42 y/o F PMx significant for MRSA bacteremia, COPD, Hypertension, ESRD on hemodialysis (MWF) (kidney bx c/w ANCA vasculitis), cocaine and heroin abuse, depression, GERD, epilepsy, Raynaud's syndrome, Rheumatoid arthritis, SLE, seizure disorder, hospitalized 12/15/21 through 12/18/21 for seizure, presented in the ER via EMS  for AMS. The patient family found her on the floor unresponsive after they lleft her alone for an hour. The patient respond only to painful stimulation. The patient was evaluated by ICU practitioner and recommended CICU admission.   assessment Dx:                       1. AMS                       2. Sepsis                        3. Seizure disorder                       4. ESKD on hemodialysis                       5. Elevated troponin                       6. Elevated BNP    Plan:    admit to CICU               medications: NPO, IVF, IV Zosyn and Vancomycin               VTEP: Heparin               Labs: cbc,bmp, dilantin  level               Radiology: CTH, CTchest , abd,               Cardiac diagnostics: EKG sinus tach               Consults: Nephrology, ID, Cardiology,Neurology                Advance Directive: full code,                                  42 y/o F PMx significant for MRSA bacteremia, COPD, Hypertension, ESRD on hemodialysis (MWF) (kidney bx c/w ANCA vasculitis), cocaine and heroin abuse, depression, GERD, epilepsy, Raynaud's syndrome, Rheumatoid arthritis, SLE, seizure disorder, hospitalized 12/15/21 through 12/18/21 for seizure, presented in the ER via EMS  for AMS. The patient family found her on the floor unresponsive after they lleft her alone for an hour. The patient respond only to painful stimulation. The patient was evaluated by ICU practitioner and recommended CICU admission.   assessment Dx:                       1. AMS                        2. Sepsis                        3. Seizure disorder                       4. ESKD on hemodialysis                       5. Elevated troponin                       6. Elevated BNP                       7. Hyponatremia treated with NS IV                       8. Cocaine abuse                       9. THC abuse     Plan:    admit to CICU               medications: NPO, IVF, IV Zosyn and Vancomycin               VTEP: Heparin               Labs: cbc,bmp, dilantin  level               Radiology: CTH, CTchest , abd,               Cardiac diagnostics: EKG sinus tach               Consults: Nephrology, ID, Cardiology,Neurology                Advance Directive: full code,                                  42 y/o F PMx significant for MRSA bacteremia, COPD, Hypertension, ESRD on hemodialysis (MWF) (kidney bx c/w ANCA vasculitis), cocaine and heroin abuse, depression, GERD, epilepsy, Raynaud's syndrome, Rheumatoid arthritis, SLE, seizure disorder, hospitalized 12/15/21 through 12/18/21 for seizure, presented in the ER via EMS  for AMS. The patient family found her on the floor unresponsive after they lleft her alone for an hour. The patient respond only to painful stimulation. The patient was evaluated by ICU practitioner and recommended CICU admission.   assessment Dx:                       1. AMS                        2. Sepsis                        3. Seizure disorder                       4. ESKD on hemodialysis                       5. Elevated troponin                       6. Elevated BNP                       7. Hyponatremia treated with NS IV                       8. Cocaine abuse                       9. THC abuse                       10. Hx. C.Diff                      11. Hx. of MRSA sepsis    Plan:    admit to CICU               medications: NPO, IVF, IV Zosyn and Vancomycin               VTEP: Heparin               Labs: cbc,bmp, dilantin  level               Radiology: CTH, CTchest , abd,               Cardiac diagnostics: EKG sinus tach               Consults: Nephrology, ID, Cardiology,Neurology                Advance Directive: full code,                                  42 y/o F PMx significant for MRSA bacteremia, COPD, Hypertension, ESRD on hemodialysis (MWF) (kidney bx c/w ANCA vasculitis), cocaine and heroin abuse, depression, GERD, epilepsy, Raynaud's syndrome, Rheumatoid arthritis, SLE, seizure disorder, hospitalized 12/15/21 through 12/18/21 for seizure, presented in the ER via EMS  for AMS. The patient family found her on the floor unresponsive after they lleft her alone for an hour. The patient respond only to painful stimulation. The patient was evaluated by ICU practitioner and recommended CICU admission.   assessment Dx:                       1. AMS                        2. Sepsis                        3. Seizure disorder                       4. ESKD on hemodialysis                       5. Elevated troponin                       6. Elevated BNP                       7. Hyponatremia treated with NS IV                       8. Cocaine abuse                       9. THC abuse                       10. Hx. C.Diff                      11. Hx. of MRSA sepsis    Plan:    admit to CICU               medications: NPO for now,  IVF, IV Zosyn and Vancomycin, medications as per med rec.                VTEP: Heparin               Labs: cbc,bmp, dilantin  level               Radiology: CTH, CTchest , abd,               Cardiac diagnostics: EKG sinus tach               Consults: Nephrology, ID, Cardiology,Neurology, ST,                 Advance Directive: full code,

## 2022-01-18 NOTE — PROGRESS NOTE ADULT - SUBJECTIVE AND OBJECTIVE BOX
Patient is a 41y old  Female who presents with a chief complaint of   24 hour events:     PAST MEDICAL & SURGICAL HISTORY:  Rheumatoid arthritis    H/O Raynaud&#x27;s syndrome    Heroin abuse    Smoker    Sepsis    HTN (hypertension)    COPD (chronic obstructive pulmonary disease)    Depression    Epilepsy    GERD (gastroesophageal reflux disease)    Bacteremia    Systemic lupus erythematosus    Aphonia    H/O tubal ligation    H/O appendicitis    Gall bladder stones    H/O tracheostomy    S/P percutaneous endoscopic gastrostomy (PEG) tube placement        Allergies    morphine (Rash)    Intolerances      REVIEW OF SYSTEMS: SEE BELOW       ICU Vital Signs Last 24 Hrs  T(C): 36.1 (2022 13:55), Max: 36.1 (2022 13:55)  T(F): 97 (2022 13:55), Max: 97 (2022 13:55)  HR: 120 (2022 17:00) (116 - 122)  BP: 176/89 (2022 17:00) (163/89 - 176/89)  BP(mean): 106 (2022 17:00) (105 - 108)  ABP: --  ABP(mean): --  RR: 22 (2022 17:00) (16 - 22)  SpO2: 100% (2022 17:00) (100% - 100%)      CAPILLARY BLOOD GLUCOSE      POCT Blood Glucose.: 77 mg/dL (2022 13:56)      I&O's Summary          MEDICATIONS  (STANDING):  vancomycin  IVPB. 1000 milliGRAM(s) IV Intermittent once      MEDICATIONS  (PRN):      PHYSICAL EXAM: SEE BELOW                          13.2   29.70 )-----------( 183      ( 2022 14:06 )             40.7       18    130<L>  |  92<L>  |  72<H>  ----------------------------<  84  5.1   |  23  |  9.36<H>    Ca    9.4      2022 14:06  Mg     3.6         TPro  8.5<H>  /  Alb  2.9<L>  /  TBili  0.5  /  DBili  x   /  AST  24  /  ALT  19  /  AlkPhos  162<H>      Lactate 5.7           01-18 @ 14:06      CARDIAC MARKERS ( 2022 14:06 )  x     / x     / 39 U/L / x     / x          PT/INR - ( 2022 14:06 )   PT: 14.9 sec;   INR: 1.30 ratio         PTT - ( 2022 14:06 )  PTT:39.9 sec  Urinalysis Basic - ( 2022 14:06 )    Color: Yellow / Appearance: Slightly Turbid / S.010 / pH: x  Gluc: x / Ketone: Trace  / Bili: Negative / Urobili: Negative   Blood: x / Protein: 500 mg/dL / Nitrite: Negative   Leuk Esterase: Trace / RBC: 25-50 /HPF / WBC 6-10   Sq Epi: x / Non Sq Epi: Occasional / Bacteria: Few          Rapid RVP Result: NotDetec ( @ 14:06)

## 2022-01-18 NOTE — ED ADULT NURSE REASSESSMENT NOTE - NS ED NURSE REASSESS COMMENT FT1
abx administration delayed d/t difficulty obtaining second set of blood cultures. multiple attempts made by multiple RNs and phlebotomy. awaiting second phlebotomist.

## 2022-01-18 NOTE — ED ADULT NURSE NOTE - OBJECTIVE STATEMENT
pt BIBEMS from home. hx of MRSA bacteremia, COPD, HTN, ESRD on hemodialysis (MWF), cocaine and heroin abuse, depression, GERD, epilepsy, Raynaud's syndrome, Rheumatoid arthritis. pt was found on floor by family with altered mental status. last known well about 1 hour PTA. on arrival to ED pt awakens and withdraws from pain. moving all extremities. bgm 77. IV access established by IV team. labs drawn and sent to lab. cardiac, SpO2, BP monitoring in place.

## 2022-01-18 NOTE — ED PROVIDER NOTE - CARDIAC, MLM
Normal rate, regular rhythm.  Heart sounds S1, S2.  No murmurs, rubs or gallops. Catheter to right chest wall.

## 2022-01-18 NOTE — ED PROVIDER NOTE - PROGRESS NOTE DETAILS
Joseph Frankel PGY3: Patient reassessed. Still altered however protecting her airway and oxygenating. Will continue to reassess closely. I, Vonda Clarke DO,  performed the initial face to face bedside interview with this patient regarding history of present illness, review of symptoms and relevant past medical, social and family history.  I completed an independent physical examination.  I was the initial provider who evaluated this patient. I have signed out the follow up of any pending tests (i.e. labs, radiological studies) to the resident.  I have communicated the patient’s plan of care and disposition with the resident.  The history, relevant review of systems, past medical and surgical history, medical decision making, and physical examination was documented by the scribe in my presence and I attest to the accuracy of the documentation. Vince DO: S/o to Dr. Mcgraw pending diagnostics at this time; labs, ct scans pending; patient with leukocytosis- broad spectrum antibiotics ordered. Vince DO: S/o to Dr. Mcgraw pending diagnostics at this time; labs, ct scans pending; patient with leukocytosis- broad spectrum antibiotics ordered. 30cc/kg bolus not given originally at this time as patient with hx of ESRD on HD. took signout from Dr Clarke pending workup. pt's MS clearing slowly.  labs notable for low seizure medication levels. Utox + cocaine.  trop and BNP as expected for HD patient.  UA negative.  seen by ICU, who thinks she is likely bacteremic but not requiring ICU at this time.  will admit to medicine for further care and evaluation.  MD Darek

## 2022-01-18 NOTE — PROGRESS NOTE ADULT - MENTAL STATUS
not awake, moves her head and extremities around, resists when being examined, no focality on exam, no seizure like activity, does not follow commands

## 2022-01-18 NOTE — H&P ADULT - NSHPLABSRESULTS_GEN_ALL_CORE
LABS: All Labs Reviewed:                          13.2   29.70 )-----------( 183      ( 18 Jan 2022 14:06 )             40.7       01-18    130<L>  |  92<L>  |  72<H>  ----------------------------<  84  5.1   |  23  |  9.36<H>    Ca    9.4      18 Jan 2022 14:06  Mg     3.6     01-18    TPro  8.5<H>  /  Alb  2.9<L>  /  TBili  0.5  /  DBili  x   /  AST  24  /  ALT  19  /  AlkPhos  162<H>  01-18      PT/INR - ( 18 Jan 2022 14:06 )   PT: 14.9 sec;   INR: 1.30 ratio         PTT - ( 18 Jan 2022 14:06 )  PTT:39.9 sec    CARDIAC MARKERS ( 18 Jan 2022 14:06 )  x     / x     / 39 U/L / x     / x      -   TroponinI hsT: <-286.07    Lactate 1.3 from 5.7, Na 130, phenitoin 1.6,   BNP 223935,  PH 7.5, Pco2 29, PO2, 101, UA wbc 6-10, RBC 25-50,  CT ne subtle compression deformities of superior T5, T6 endplates, CT chest clear  CTH:  No evidence of acute hemorrhage mass mass effect in posterior   fossa or supratentorial region.  CXray :  Large right jugular line. Otherwise unremarkable study. LABS: All Labs Reviewed:                          13.2   29.70 )-----------( 183      ( 18 Jan 2022 14:06 )             40.7       01-18    130<L>  |  92<L>  |  72<H>  ----------------------------<  84  5.1   |  23  |  9.36<H>    Ca    9.4      18 Jan 2022 14:06  Mg     3.6     01-18    TPro  8.5<H>  /  Alb  2.9<L>  /  TBili  0.5  /  DBili  x   /  AST  24  /  ALT  19  /  AlkPhos  162<H>  01-18      PT/INR - ( 18 Jan 2022 14:06 )   PT: 14.9 sec;   INR: 1.30 ratio         PTT - ( 18 Jan 2022 14:06 )  PTT:39.9 sec    CARDIAC MARKERS ( 18 Jan 2022 14:06 )  x     / x     / 39 U/L / x     / x      -   TroponinI hsT: <-286.07    Lactate 1.3 from 5.7, Na 130, phenitoin 1.6,   BNP 066238,  PH 7.5, Pco2 29, PO2, 101, UA wbc 6-10, RBC 25-50,  EKG sinus tachycardia.  CT ne subtle compression deformities of superior T5, T6 endplates, CT chest clear  CTH:  No evidence of acute hemorrhage mass mass effect in posterior   fossa or supratentorial region.  CXray :  Large right jugular line. Otherwise unremarkable study.

## 2022-01-18 NOTE — ED ADULT TRIAGE NOTE - CHIEF COMPLAINT QUOTE
patient brought in by EMS from home w/ AMS.  hx ESRD on dialysis.  family returned home after being out for approx 1 hour to find patient minimally responsive, responsive to painful stimuli on arrival.  had seizure 2 weeks ago.

## 2022-01-18 NOTE — ED PROVIDER NOTE - CLINICAL SUMMARY MEDICAL DECISION MAKING FREE TEXT BOX
42 y/o female with AMS. Recent admission for seizure disorder. Found on ground, no signs of trauma. Pt protecting airway at this time. Plan for EKG, CXR, CT imaging, labs and admit.

## 2022-01-18 NOTE — PROGRESS NOTE ADULT - ASSESSMENT
40 y/o female with multiple medical problems including lupus, RA, COPD, polysubstance drug use, previous trach/PEG, cachexia, ESRD on dialysis, now with metabolic encephalopathy likely due to sepsis - high likelihood of bacteremia.     /89,  sinus tach, Spo2 96% on RA, ABG with normal ventilation     CT head neg for CVA, no focality on exam, no obvious seizure episode - encephalopathy likely due to sepsis +/- drug use     Protecting airway     Recs:   Empiric broad spectrum abx, f/u BCx  Consult ID   IVF  NPO, aspiration precautions   Has new T5, T6 compression deformities, b/l shoulder effusions, ?R hip joint effusion - maybe chronic however will need to be evaluated if +BCx    No current need for ICU, reconsult prn - d/w ED attending

## 2022-01-18 NOTE — H&P ADULT - NSHPPHYSICALEXAM_GEN_ALL_CORE
Physical Exam: Vital Signs Last 24 Hrs  T(C): 36.1 (18 Jan 2022 13:55), Max: 36.1 (18 Jan 2022 13:55)  T(F): 97 (18 Jan 2022 13:55), Max: 97 (18 Jan 2022 13:55)  HR: 120 (18 Jan 2022 21:12) (116 - 122)  BP: 164/85 (18 Jan 2022 21:12) (163/89 - 176/89)  BP(mean): 102 (18 Jan 2022 21:12) (102 - 108)  RR: 21 (18 Jan 2022 21:12) (16 - 22)  SpO2: 99% (18 Jan 2022 21:12) (99% - 100%)        HEENT: keeps eyes close , PERRL ,     MOUTH/TEETH/GUMS: Clear    NECK: no JVD    LUNGS: Clear    HEART: S1,S2 RR    ABDOMEN: soft nontender    EXTREMITIES:  no pedal edema    MUSCULOSKELETAL: no joint swelling     NEURO: no termor or seizure, no focal weakness, responds to painful stimulation.    SKIN: no rash    : CVA negative,

## 2022-01-18 NOTE — H&P ADULT - HISTORY OF PRESENT ILLNESS
HPI: The patient a  40 y/o F PMx significant for MRSA bacteremia, COPD, Hypertension, ESRD on hemodialysis (MWF) (kidney bx c/w ANCA vasculitis), cocaine and heroin abuse, depression, GERD, epilepsy, Raynaud's syndrome, Rheumatoid arthritis, SLE, seizure disorder, hospitalized 12/15/21 through 12/18/21 for seizure, presented in the ER via EMS  for AMS. The patient family found her on the floor unresponsive after they lleft her alone for an hour. The patient respond only to painful stimulation. The patient was evaluated by ICU practitioner and recommended CICU admission.     PMHx: as above    PSHx: PEGT, BTL, tracheostomy, appendectomy    Family Hx: not obtainable secondary to AMS unresponsive     Social Hx.: drug abuse,

## 2022-01-18 NOTE — ED PROVIDER NOTE - OBJECTIVE STATEMENT
42 y/o female with PMHx of MRSA bacteremia, COPD, HTN, ESRD on hemodialysis (MWF) (kidney bx c/w ANCA vasculitis), cocaine and heroin abuse, depression, GERD, epilepsy, Raynaud's syndrome, Rheumatoid arthritis, SLE presents to the ED BIBA from home for evaluation of AMS. Family came back from the store and found pt on the floor. Last known well about 1 hour ago. EMS finger stick glucose: 103. Of note, pt was admitted recently for a seizure she had while at dialysis. 40 y/o female with PMHx of MRSA bacteremia, COPD, HTN, ESRD on hemodialysis (MWF) (kidney bx c/w ANCA vasculitis), cocaine and heroin abuse, depression, GERD, epilepsy, Raynaud's syndrome, Rheumatoid arthritis, SLE presents to the ED BIBA from home for AMS. Family came back from the store and found pt on the floor. Last known well was about 1 hour ago. EMS finger stick glucose: 103. Finger stick on arrival to ED: 77. Of note, pt was admitted recently for a seizure. 40 y/o female with PMHx of MRSA bacteremia, COPD, HTN, ESRD on hemodialysis (MWF) (kidney bx c/w ANCA vasculitis), cocaine and heroin abuse, depression, GERD, epilepsy, Raynaud's syndrome, Rheumatoid arthritis, SLE presents to the ED BIBA from home for AMS. Family came back from the store and found pt on the floor. Last known well was about 1 hour PTA. EMS finger stick glucose: 103. Finger stick on arrival to ED: 77. Of note, pt was admitted recently for a seizure.

## 2022-01-18 NOTE — ED ADULT NURSE NOTE - NSIMPLEMENTINTERV_GEN_ALL_ED
Implemented All Fall Risk Interventions:  [place] to call system. Call bell, personal items and telephone within reach. Instruct patient to call for assistance. Room bathroom lighting operational. Non-slip footwear when patient is off stretcher. Physically safe environment: no spills, clutter or unnecessary equipment. Stretcher in lowest position, wheels locked, appropriate side rails in place. Provide visual cue, wrist band, yellow gown, etc. Monitor gait and stability. Monitor for mental status changes and reorient to person, place, and time. Review medications for side effects contributing to fall risk. Reinforce activity limits and safety measures with patient and family.

## 2022-01-19 DIAGNOSIS — R77.8 OTHER SPECIFIED ABNORMALITIES OF PLASMA PROTEINS: ICD-10-CM

## 2022-01-19 DIAGNOSIS — F17.200 NICOTINE DEPENDENCE, UNSPECIFIED, UNCOMPLICATED: ICD-10-CM

## 2022-01-19 DIAGNOSIS — R79.89 OTHER SPECIFIED ABNORMAL FINDINGS OF BLOOD CHEMISTRY: ICD-10-CM

## 2022-01-19 DIAGNOSIS — R00.0 TACHYCARDIA, UNSPECIFIED: ICD-10-CM

## 2022-01-19 LAB
ANION GAP SERPL CALC-SCNC: 21 MMOL/L — HIGH (ref 5–17)
ANION GAP SERPL CALC-SCNC: 25 MMOL/L — HIGH (ref 5–17)
BASE EXCESS BLDA CALC-SCNC: -3.5 MMOL/L — LOW (ref -2–3)
BLOOD GAS COMMENTS ARTERIAL: SIGNIFICANT CHANGE UP
BUN SERPL-MCNC: 32 MG/DL — HIGH (ref 7–23)
BUN SERPL-MCNC: 76 MG/DL — HIGH (ref 7–23)
CALCIUM SERPL-MCNC: 8.9 MG/DL — SIGNIFICANT CHANGE UP (ref 8.5–10.1)
CALCIUM SERPL-MCNC: 9.6 MG/DL — SIGNIFICANT CHANGE UP (ref 8.5–10.1)
CHLORIDE SERPL-SCNC: 93 MMOL/L — LOW (ref 96–108)
CHLORIDE SERPL-SCNC: 97 MMOL/L — SIGNIFICANT CHANGE UP (ref 96–108)
CO2 BLDA-SCNC: 17 MMOL/L — LOW (ref 19–24)
CO2 SERPL-SCNC: 14 MMOL/L — LOW (ref 22–31)
CO2 SERPL-SCNC: 16 MMOL/L — LOW (ref 22–31)
CREAT SERPL-MCNC: 4.96 MG/DL — HIGH (ref 0.5–1.3)
CREAT SERPL-MCNC: 9.1 MG/DL — HIGH (ref 0.5–1.3)
CULTURE RESULTS: SIGNIFICANT CHANGE UP
GAS PNL BLDA: SIGNIFICANT CHANGE UP
GLUCOSE SERPL-MCNC: 67 MG/DL — LOW (ref 70–99)
GLUCOSE SERPL-MCNC: 91 MG/DL — SIGNIFICANT CHANGE UP (ref 70–99)
HAV IGM SER-ACNC: SIGNIFICANT CHANGE UP
HBV CORE IGM SER-ACNC: SIGNIFICANT CHANGE UP
HBV SURFACE AG SER-ACNC: SIGNIFICANT CHANGE UP
HCO3 BLDA-SCNC: 16 MMOL/L — LOW (ref 21–28)
HCT VFR BLD CALC: 36 % — SIGNIFICANT CHANGE UP (ref 34.5–45)
HCV AB S/CO SERPL IA: 0.29 S/CO — SIGNIFICANT CHANGE UP (ref 0–0.99)
HCV AB SERPL-IMP: SIGNIFICANT CHANGE UP
HGB BLD-MCNC: 11.7 G/DL — SIGNIFICANT CHANGE UP (ref 11.5–15.5)
MCHC RBC-ENTMCNC: 29.2 PG — SIGNIFICANT CHANGE UP (ref 27–34)
MCHC RBC-ENTMCNC: 32.5 GM/DL — SIGNIFICANT CHANGE UP (ref 32–36)
MCV RBC AUTO: 89.8 FL — SIGNIFICANT CHANGE UP (ref 80–100)
PCO2 BLDA: 18 MMHG — LOW (ref 32–35)
PH BLDA: 7.56 — HIGH (ref 7.35–7.45)
PLATELET # BLD AUTO: 188 K/UL — SIGNIFICANT CHANGE UP (ref 150–400)
PO2 BLDA: 70 MMHG — LOW (ref 83–108)
POTASSIUM SERPL-MCNC: 4.5 MMOL/L — SIGNIFICANT CHANGE UP (ref 3.5–5.3)
POTASSIUM SERPL-MCNC: 4.5 MMOL/L — SIGNIFICANT CHANGE UP (ref 3.5–5.3)
POTASSIUM SERPL-SCNC: 4.5 MMOL/L — SIGNIFICANT CHANGE UP (ref 3.5–5.3)
POTASSIUM SERPL-SCNC: 4.5 MMOL/L — SIGNIFICANT CHANGE UP (ref 3.5–5.3)
RBC # BLD: 4.01 M/UL — SIGNIFICANT CHANGE UP (ref 3.8–5.2)
RBC # FLD: 15.3 % — HIGH (ref 10.3–14.5)
SAO2 % BLDA: 97 % — SIGNIFICANT CHANGE UP
SODIUM SERPL-SCNC: 132 MMOL/L — LOW (ref 135–145)
SODIUM SERPL-SCNC: 134 MMOL/L — LOW (ref 135–145)
SPECIMEN SOURCE: SIGNIFICANT CHANGE UP
WBC # BLD: 19.19 K/UL — HIGH (ref 3.8–10.5)
WBC # FLD AUTO: 19.19 K/UL — HIGH (ref 3.8–10.5)

## 2022-01-19 PROCEDURE — 71275 CT ANGIOGRAPHY CHEST: CPT | Mod: 26

## 2022-01-19 PROCEDURE — 93306 TTE W/DOPPLER COMPLETE: CPT | Mod: 26

## 2022-01-19 PROCEDURE — 99223 1ST HOSP IP/OBS HIGH 75: CPT

## 2022-01-19 PROCEDURE — 99233 SBSQ HOSP IP/OBS HIGH 50: CPT

## 2022-01-19 PROCEDURE — 71045 X-RAY EXAM CHEST 1 VIEW: CPT | Mod: 26

## 2022-01-19 RX ORDER — FOSPHENYTOIN 50 MG/ML
300 INJECTION INTRAMUSCULAR; INTRAVENOUS
Refills: 0 | Status: DISCONTINUED | OUTPATIENT
Start: 2022-01-19 | End: 2022-01-20

## 2022-01-19 RX ORDER — HEPARIN SODIUM 5000 [USP'U]/ML
4000 INJECTION INTRAVENOUS; SUBCUTANEOUS EVERY 6 HOURS
Refills: 0 | Status: DISCONTINUED | OUTPATIENT
Start: 2022-01-19 | End: 2022-01-19

## 2022-01-19 RX ORDER — HEPARIN SODIUM 5000 [USP'U]/ML
4000 INJECTION INTRAVENOUS; SUBCUTANEOUS ONCE
Refills: 0 | Status: DISCONTINUED | OUTPATIENT
Start: 2022-01-19 | End: 2022-01-19

## 2022-01-19 RX ORDER — FOSPHENYTOIN 50 MG/ML
1000 INJECTION INTRAMUSCULAR; INTRAVENOUS ONCE
Refills: 0 | Status: COMPLETED | OUTPATIENT
Start: 2022-01-19 | End: 2022-01-19

## 2022-01-19 RX ORDER — HEPARIN SODIUM 5000 [USP'U]/ML
INJECTION INTRAVENOUS; SUBCUTANEOUS
Qty: 25000 | Refills: 0 | Status: DISCONTINUED | OUTPATIENT
Start: 2022-01-19 | End: 2022-01-19

## 2022-01-19 RX ORDER — METOPROLOL TARTRATE 50 MG
2.5 TABLET ORAL EVERY 6 HOURS
Refills: 0 | Status: DISCONTINUED | OUTPATIENT
Start: 2022-01-19 | End: 2022-01-31

## 2022-01-19 RX ORDER — HYDRALAZINE HCL 50 MG
10 TABLET ORAL ONCE
Refills: 0 | Status: COMPLETED | OUTPATIENT
Start: 2022-01-19 | End: 2022-01-19

## 2022-01-19 RX ORDER — VANCOMYCIN HCL 1 G
500 VIAL (EA) INTRAVENOUS
Refills: 0 | Status: COMPLETED | OUTPATIENT
Start: 2022-01-19 | End: 2022-01-24

## 2022-01-19 RX ORDER — HEPARIN SODIUM 5000 [USP'U]/ML
2000 INJECTION INTRAVENOUS; SUBCUTANEOUS EVERY 6 HOURS
Refills: 0 | Status: DISCONTINUED | OUTPATIENT
Start: 2022-01-19 | End: 2022-01-19

## 2022-01-19 RX ADMIN — FOSPHENYTOIN 140 MILLIGRAM(S) PE: 50 INJECTION INTRAMUSCULAR; INTRAVENOUS at 15:27

## 2022-01-19 RX ADMIN — Medication 60 MILLIGRAM(S): at 16:37

## 2022-01-19 RX ADMIN — Medication 1 DROP(S): at 15:28

## 2022-01-19 RX ADMIN — PIPERACILLIN AND TAZOBACTAM 25 GRAM(S): 4; .5 INJECTION, POWDER, LYOPHILIZED, FOR SOLUTION INTRAVENOUS at 22:45

## 2022-01-19 RX ADMIN — Medication 100 MILLIGRAM(S): at 16:35

## 2022-01-19 RX ADMIN — Medication 1 DROP(S): at 22:44

## 2022-01-19 RX ADMIN — Medication 1 PATCH: at 16:36

## 2022-01-19 RX ADMIN — Medication 1 DROP(S): at 04:00

## 2022-01-19 RX ADMIN — Medication 650 MILLIGRAM(S): at 21:52

## 2022-01-19 RX ADMIN — Medication 1 DROP(S): at 20:00

## 2022-01-19 RX ADMIN — Medication 10 MILLIGRAM(S): at 05:15

## 2022-01-19 RX ADMIN — FOSPHENYTOIN 112 MILLIGRAM(S) PE: 50 INJECTION INTRAMUSCULAR; INTRAVENOUS at 21:51

## 2022-01-19 NOTE — CONSULT NOTE ADULT - SUBJECTIVE AND OBJECTIVE BOX
42 y/o F PMx significant for MRSA bacteremia, COPD, Hypertension, ESRD on hemodialysis (MWF) (kidney bx c/w ANCA vasculitis), cocaine and heroin abuse, depression, GERD, epilepsy, Raynaud's syndrome, Rheumatoid arthritis, SLE, seizure disorder, hospitalized 12/15/21 through 12/18/21 for seizure, presented in the ER on 01/18 via EMS  for AMS.     -Pt w leukocytosis, sinus tachycardia, high sensitivity trop of 286, elevated BNP, + cocaine and THC on Utox. Cardiology consulted for further evaluation of elevated trop/BNP.     01/19: Patient seen at bedside somnolent, responsive to sternal rub. When spoken to, can nod head in understanding, nonverbally responsive at the moment. Comfortable on 1L NC. Sinus tachy at bedside cardiac monitor.     Vitals:  ============  T(F): 99.4 (19 Jan 2022 11:15), Max: 99.4 (19 Jan 2022 11:15)  HR: 120 (19 Jan 2022 11:45)  BP: 151/117 (19 Jan 2022 11:45)  RR: 26 (19 Jan 2022 11:15)  SpO2: 97% (19 Jan 2022 11:45) (93% - 100%)  temp max in last 48H T(F): , Max: 99.4 (01-19-22 @ 11:15)    Physical Exam   Gen: comfortable  CV: sinus tachycardia, s1/s2, no M/R/G  Pulm: crackles auscultated throughout lung fields, nl respiratory effort on 1L NC  Abd: normoactive bowel sounds in all 4 quadrants, soft, nontender, nondistended, no rebound, no guarding, no masses  Extremities: no pedal edema, pedal pulses palpable   Skin: nl warm and dry, no wounds   Neuro: Nonverbally responsive at the moment, nods head in understanding      =======================================================  Current Antibiotics:  atovaquone  Suspension 1500 milliGRAM(s) Oral daily  hydroxychloroquine 200 milliGRAM(s) Oral daily  piperacillin/tazobactam IVPB.. 3.375 Gram(s) IV Intermittent every 12 hours    Other medications:  amLODIPine   Tablet 10 milliGRAM(s) Oral daily  artificial  tears Solution 1 Drop(s) Both EYES every 2 hours  cloNIDine 0.1 milliGRAM(s) Oral daily  fosphenytoin IVPB 1000 milliGRAM(s) PE IV Intermittent once  heparin   Injectable 5000 Unit(s) SubCutaneous every 12 hours  hydrALAZINE 25 milliGRAM(s) Oral three times a day  pantoprazole    Tablet 40 milliGRAM(s) Oral before breakfast  phenytoin   Capsule 300 milliGRAM(s) Oral at bedtime  predniSONE   Tablet 50 milliGRAM(s) Oral daily      =======================================================  Labs:                        11.7   19.19 )-----------( 188      ( 19 Jan 2022 11:15 )             36.0     01-18    130<L>  |  92<L>  |  72<H>  ----------------------------<  84  5.1   |  23  |  9.36<H>    Ca    9.4      18 Jan 2022 14:06  Mg     3.6     01-18    TPro  8.5<H>  /  Alb  2.9<L>  /  TBili  0.5  /  DBili  x   /  AST  24  /  ALT  19  /  AlkPhos  162<H>  01-18      Creatinine, Serum: 9.36 mg/dL (01-18-22 @ 14:06)            WBC Count: 19.19 K/uL (01-19-22 @ 11:15)  WBC Count: 29.70 K/uL (01-18-22 @ 14:06)    SARS-CoV-2: NotDetec (01-18-22 @ 14:06)  Rapid RVP Result: NotDetec (01-18-22 @ 14:06)    SARS-CoV-2: NotDetec (01-18-22 @ 14:06)      Alkaline Phosphatase, Serum: 162 U/L (01-18-22 @ 14:06)  Alanine Aminotransferase (ALT/SGPT): 19 U/L (01-18-22 @ 14:06)  Aspartate Aminotransferase (AST/SGOT): 24 U/L (01-18-22 @ 14:06)  Bilirubin Total, Serum: 0.5 mg/dL (01-18-22 @ 14:06)    < from: Xray Chest 1 View- PORTABLE-Urgent (Xray Chest 1 View- PORTABLE-Urgent .) (01.18.22 @ 15:13) >  IMPRESSION: Large right jugular line. Otherwise unremarkable study.    < end of copied text >      < from: CT Head No Cont (01.18.22 @ 15:44) >  IMPRESSION: No evidence of acute hemorrhage mass mass effect in posterior   fossa or supratentorial region.    No fracture or dislocation involving the cervical spine.    < end of copied text >    < from: CT Chest No Cont (01.18.22 @ 15:52) >  IMPRESSION: New subtle compression deformities of the superior T5 and T6   endplates. No evidence of visceral organ injury.      < end of copied text >

## 2022-01-19 NOTE — PROGRESS NOTE ADULT - SUBJECTIVE AND OBJECTIVE BOX
CODE STATUS: full  Hospital day # 1    chief complaint: AMS    HPI: 40 y/o F with PMx significant for MRSA bacteremia, COPD, Hypertension, ESRD on hemodialysis (MWF) (kidney bx c/w ANCA vasculitis), cocaine and heroin abuse, depression, GERD, epilepsy, Raynaud's syndrome, Rheumatoid arthritis, SLE, seizure disorder (hospitalized 12/15/21 through 12/18/21 for seizure, was discharged on Dilantin), presented to ED via EMS for AMS on 1/18/21. I spoke to the patient's brother, who states he went to the store around 11:30 am yesterday and when he came back, he found her laying face up on the floor next to her bed, was unresponsive and foaming from the mouth. He said she looked 'out of it' like she just had a seizure, was staring into space. He states the last time she had a seizure was during hemodialysis last month; before that, she had not experienced a seizure for about 8 years. He thinks the onset of her seizures 8 years ago was secondary to drug use. When EMS came yesterday and picked her up, there were needles underneath her. Reports she tells him she's depressed, doesn't want to live, has constant pain. He says she tells him that she takes her dilantin, but he doesn't know if she's actually compliant.   Patient was admitted from ED yesterday for leukocytosis seen on labs, AMS, low dilantin serum levels, +cocaine on Utox, ICU was consulted and felt that patient was bacteremic. CTH was done and is unremarkable. Today, patient was seen laying in bed and awake, but will not speak to me, won't follow most commands. She is is more alert at noon answered yes to one question and nodded that she is not taking the Dilantin    O2 delivery and O2 sat:  NC- 95%    Vital Signs Last 24 Hrs  T(C): 37.4 (19 Jan 2022 11:15), Max: 37.4 (19 Jan 2022 11:15)  T(F): 99.4 (19 Jan 2022 11:15), Max: 99.4 (19 Jan 2022 11:15)  HR: 126 (19 Jan 2022 12:45) (108 - 126)  BP: 114/82 (19 Jan 2022 12:45) (114/82 - 176/89)  BP(mean): 93 (19 Jan 2022 12:45) (93 - 133)  RR: 40 (19 Jan 2022 12:45) (16 - 41)  SpO2: 95% (19 Jan 2022 12:45) (93% - 100%)    PE:  HEENT: NC, AT, Perrla  Neck: Supple, no JVD  Chest: CTA  CVS: S1S2 reg no m/g/r  Abd: sof Nt/Nd + BS  Extr: no edema, clubbing, cyanosis; LLE has contraction unable to fully extend; some fingers are also contracted  Neuro: nonfocal, mot strenght 5/5 all extremities  Musculoskeletal: normal tone  Skin: no rashes, warm      Labs:                           11.7   19.19 )-----------( 188      ( 19 Jan 2022 11:15 )             36.0   01-19    134<L>  |  97  |  76<H>  ----------------------------<  67<L>  4.5   |  16<L>  |  9.10<H>    Ca    8.9      19 Jan 2022 11:15  Mg     3.6     01-18    TPro  8.5<H>  /  Alb  2.9<L>  /  TBili  0.5  /  DBili  x   /  AST  24  /  ALT  19  /  AlkPhos  162<H>  01-18       TTE Echo Complete w/o Contrast w/ Doppler (01.19.22 @ 08:38) >   The left ventricle is normal in size.   Moderately reduced left ventricular systolic function.   Estimated left ventricular ejection fraction is 40-45 %.   Overall LV dysfunction appears global.   Normal appearing right ventricle structure and function.   Normal appearing mitral valve structure and function.   Mild (1+) mitral regurgitation is present.   Normal appearing tricuspid valve structure and function.   Trace tricuspid valve regurgitation is present.   No evidence of pericardial effusion.        Tele sinus tachy    CT Chest No Cont (01.18.22 @ 15:52) >  IMPRESSION: New subtle compression deformities of the superior T5 and T6   endplates. No evidence of visceral organ injury.          A/P:    * Toxic metabolic encephalopathy  infection vs seizures vs CVA  empiric abx  Mepron for PCP px  nothing to suggest meningitis  IV AED; pt not taking Dilantin  improved WBC    * LLE contracture to ask fam if this is chronic; has severe muscle wasting  left thigh     * h/o vasculitis on steroids    * Plan for Psych consult when improved seems v depressed     * ESRD  HD    * Deformity T5 T6- check ESR    * Prev. h/o MRSA bacteremia

## 2022-01-19 NOTE — CONSULT NOTE ADULT - SUBJECTIVE AND OBJECTIVE BOX
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  Recent admit for seizures after non-compliance with sz meds.      PAST MEDICAL & SURGICAL HISTORY:  Rheumatoid arthritis    H/O Raynaud&#x27;s syndrome    Heroin abuse    Smoker    Sepsis    HTN (hypertension)    COPD (chronic obstructive pulmonary disease)    Depression    Epilepsy    GERD (gastroesophageal reflux disease)    Bacteremia    Systemic lupus erythematosus    Aphonia    H/O tubal ligation    H/O appendicitis    Gall bladder stones    H/O tracheostomy    S/P percutaneous endoscopic gastrostomy (PEG) tube placement        MEDICATIONS  (STANDING):  amLODIPine   Tablet 10 milliGRAM(s) Oral daily  artificial  tears Solution 1 Drop(s) Both EYES every 2 hours  atovaquone  Suspension 1500 milliGRAM(s) Oral daily  cloNIDine 0.1 milliGRAM(s) Oral daily  heparin   Injectable 5000 Unit(s) SubCutaneous every 12 hours  hydrALAZINE 25 milliGRAM(s) Oral three times a day  hydroxychloroquine 200 milliGRAM(s) Oral daily  pantoprazole    Tablet 40 milliGRAM(s) Oral before breakfast  phenytoin   Capsule 100 milliGRAM(s) Oral at bedtime  piperacillin/tazobactam IVPB.. 3.375 Gram(s) IV Intermittent every 12 hours  predniSONE   Tablet 50 milliGRAM(s) Oral daily    MEDICATIONS  (PRN):  acetaminophen     Tablet .. 650 milliGRAM(s) Oral every 6 hours PRN Temp greater or equal to 38C (100.4F), Mild Pain (1 - 3)  ALBUTerol    90 MICROgram(s) HFA Inhaler 2 Puff(s) Inhalation every 6 hours PRN Shortness of Breath and/or Wheezing  aluminum hydroxide/magnesium hydroxide/simethicone Suspension 30 milliLiter(s) Oral every 4 hours PRN Dyspepsia  melatonin 3 milliGRAM(s) Oral at bedtime PRN Insomnia  ondansetron Injectable 4 milliGRAM(s) IV Push every 8 hours PRN Nausea and/or Vomiting  simethicone 80 milliGRAM(s) Chew three times a day PRN Gas      Allergies    morphine (Rash)    Intolerances        SOCIAL HISTORY:  + drug use  FAMILY HISTORY:  Family history of cardiomyopathy (Mother)        REVIEW OF SYSTEMS:    UTO, lethargy      T(C): , Max: 37.2 (22 @ 08:00)  T(F): , Max: 98.9 (22 @ 08:00)  HR: 108 (22 @ 08:00)  BP: 159/96 (22 @ 08:00)  BP(mean): 110 (22 @ 08:00)  RR: 28 (22 @ 08:00)  SpO2: 99% (22 @ 08:00)  Wt(kg): --    Height (cm): 165.1 ( @ 13:55)  Weight (kg): 47.627 ( @ 17:42)  BMI (kg/m2): 17.5 (:42)  BSA (m2): 1.5 (:42)    PHYSICAL EXAM:    Constitutional: frail, >>stated age  HEENT: frail, temp wasting  Neck: No LAD, No JVD  Respiratory: scatt ronchi   S1 and S2, RRR  Gastrointestinal: BS+   Neurological: Arousable  marcy cath R        LABS:                        13.2   29.70 )-----------( 183      ( 2022 14:06 )             40.7     2022 14:06    130    |  92     |  72     ----------------------------<  84     5.1     |  23     |  9.36     Ca    9.4        2022 14:06  Mg     3.6       2022 14:06    TPro  8.5    /  Alb  2.9    /  TBili  0.5    /  DBili  x      /  AST  24     /  ALT  19     /  AlkPhos  162    2022 14:06      Creatine Kinase, Serum: 39 U/L [26 - 192] ( @ 14:06)      Urine Studies:  Urinalysis Basic - ( 2022 14:06 )    Color: Yellow / Appearance: Slightly Turbid / S.010 / pH: x  Gluc: x / Ketone: Trace  / Bili: Negative / Urobili: Negative   Blood: x / Protein: 500 mg/dL / Nitrite: Negative   Leuk Esterase: Trace / RBC: 25-50 /HPF / WBC 6-10   Sq Epi: x / Non Sq Epi: Occasional / Bacteria: Few            RADIOLOGY & ADDITIONAL STUDIES:

## 2022-01-19 NOTE — CONSULT NOTE ADULT - SUBJECTIVE AND OBJECTIVE BOX
CC: AMS, sepsis, ESKD (18 Jan 2022 23:08)    HPI: 42 y/o F with PMx significant for MRSA bacteremia, COPD, Hypertension, ESRD on hemodialysis (MWF) (kidney bx c/w ANCA vasculitis), cocaine and heroin abuse, depression, GERD, epilepsy, Raynaud's syndrome, Rheumatoid arthritis, SLE, seizure disorder (hospitalized 12/15/21 through 12/18/21 for seizure, was discharged on Dilantin), presented to ED via EMS for AMS on 1/18/21. I spoke to the patient's brother, who states he went to the store around 11:30 am yesterday and when he came back, he found her laying face up on the floor next to her bed, was unresponsive and foaming from the mouth. He said she looked 'out of it' like she just had a seizure, was staring into space. He states the last time she had a seizure was during hemodialysis last month; before that, she had not experienced a seizure for about 8 years. He thinks the onset of her seizures 8 years ago was secondary to drug use. When EMS came yesterday and picked her up, there were needles underneath her. Reports she tells him she's depressed, doesn't want to live, has constant pain. He says she tells him that she takes her dilantin, but he doesn't know if she's actually compliant.     Patient was admitted from ED yesterday for leukocytosis seen on labs, AMS, low dilantin serum levels, +cocaine on Utox, ICU was consulted and felt that patient was bacteremic. CTH was done and is unremarkable. Today, patient was seen laying in bed and awake, but will not speak to me, won't follow most commands. She is responsive to painful stimuli.     PAST MEDICAL & SURGICAL HISTORY:  Rheumatoid arthritis  H/O Raynaud's syndrome  Heroin abuse  Smoker  Sepsis  HTN (hypertension)  COPD (chronic obstructive pulmonary disease)  Depression  Epilepsy  GERD (gastroesophageal reflux disease)  Bacteremia  Systemic lupus erythematosus  Aphonia  H/O tubal ligation  H/O appendicitis  Gall bladder stones  H/O tracheostomy  S/P percutaneous endoscopic gastrostomy (PEG) tube placement  Past surgeries: PEGT, BTL, tracheostomy, appendectomy    Family Hx: not obtainable secondary to AMS unresponsive     Social Hx: h/o drug abuse, as per patient's brother and prior hospital notes    MEDICATIONS  (STANDING):  amLODIPine   Tablet 10 milliGRAM(s) Oral daily  artificial  tears Solution 1 Drop(s) Both EYES every 2 hours  atovaquone  Suspension 1500 milliGRAM(s) Oral daily  cloNIDine 0.1 milliGRAM(s) Oral daily  heparin   Injectable 5000 Unit(s) SubCutaneous every 12 hours  hydrALAZINE 25 milliGRAM(s) Oral three times a day  hydroxychloroquine 200 milliGRAM(s) Oral daily  pantoprazole    Tablet 40 milliGRAM(s) Oral before breakfast  phenytoin   Capsule 100 milliGRAM(s) Oral at bedtime  piperacillin/tazobactam IVPB.. 3.375 Gram(s) IV Intermittent every 12 hours  predniSONE   Tablet 50 milliGRAM(s) Oral daily     Allergies: morphine (Rash)    ROS: Unable to obtain d/t patient's condition    Vital Signs Last 24 Hrs  T(C): 37.2 (19 Jan 2022 08:00), Max: 37.2 (19 Jan 2022 08:00)  T(F): 98.9 (19 Jan 2022 08:00), Max: 98.9 (19 Jan 2022 08:00)  HR: 108 (19 Jan 2022 08:00) (108 - 122)  BP: 159/96 (19 Jan 2022 08:00) (155/81 - 176/89)  BP(mean): 110 (19 Jan 2022 08:00) (95 - 123)  RR: 28 (19 Jan 2022 08:00) (16 - 41)  SpO2: 99% (19 Jan 2022 08:00) (93% - 100%)    Physical Exam:    General: Normocephalic, NAD  HEENT: PERRLA  Neck: Supple.  Respiratory: Breath sounds are clear bilaterally  Cardiovascular: S1 and S2, regular   Extremities:  no edema  Vascular: Carotid Bruit - no  Skin: No rashes    Neurological exam:  HF: Somnolent but awakens with repeated verbal stimulation, unable to assess speech or affect as she will not speak, follows only certain commands (smiled when asked)  CN: LAYTON, no NLFD  Motor: not cooperative with testing, resisting passive movements  Sens: withdraws to painful stimuli   Reflexes: Symmetric 0-1 throughout, downgoing toes b/l  Coord:  Could not assess  Gait/Balance: Cannot test      Labs:   01-18    130<L>  |  92<L>  |  72<H>  ----------------------------<  84  5.1   |  23  |  9.36<H>    Ca    9.4      18 Jan 2022 14:06  Mg     3.6     01-18    TPro  8.5<H>  /  Alb  2.9<L>  /  TBili  0.5  /  DBili  x   /  AST  24  /  ALT  19  /  AlkPhos  162<H>  01-18                 13.2   29.70 )-----------( 183      ( 18 Jan 2022 14:06 )             40.7   Phenytoin Level, Serum: 1.6 ug/mL (01.18.22 @ 17:17)  THC, Urine Qualitative: Positive (01.18.22 @ 14:06)   Cocaine Metabolite, Urine: Positive (01.18.22 @ 14:06)     Radiology:   CT Head and CT Cspine No Cont (01.18.22 @ 15:44)     IMPRESSION: No evidence of acute hemorrhage mass effect in posterior   fossa or supratentorial region.    No fracture or dislocation involving the cervical spine.     CC: AMS, sepsis, ESKD (18 Jan 2022 23:08)    HPI: 40 y/o F with PMx significant for MRSA bacteremia, COPD, Hypertension, ESRD on hemodialysis (MWF) (kidney bx c/w ANCA vasculitis), cocaine and heroin abuse, depression, GERD, epilepsy, Raynaud's syndrome, Rheumatoid arthritis, SLE, seizure disorder (hospitalized 12/15/21 through 12/18/21 for seizure, was discharged on Dilantin), presented to ED via EMS for AMS on 1/18/21. I spoke to the patient's brother, who states he went to the store around 11:30 am yesterday and when he came back, he found her laying face up on the floor next to her bed, was unresponsive and foaming from the mouth. He said she looked 'out of it' like she just had a seizure, was staring into space. He states the last time she had a seizure was during hemodialysis last month; before that, she had not experienced a seizure for about 8 years. He thinks the onset of her seizures 8 years ago was secondary to drug use. When EMS came yesterday and picked her up, there were needles underneath her. Reports she tells him she's depressed, doesn't want to live, has constant pain. He says she tells him that she takes her dilantin, but he doesn't know if she's actually compliant.     Patient was admitted from ED yesterday for leukocytosis seen on labs, AMS, low dilantin serum levels, +cocaine on Utox, ICU was consulted and felt that patient was bacteremic. CTH was done and is unremarkable. Today, patient was seen laying in bed and awake, but will not speak to me, won't follow most commands. She is responsive to painful stimuli.     PAST MEDICAL & SURGICAL HISTORY:  Rheumatoid arthritis  H/O Raynaud's syndrome  Heroin abuse  Smoker  Sepsis  HTN (hypertension)  COPD (chronic obstructive pulmonary disease)  Depression  Epilepsy  GERD (gastroesophageal reflux disease)  Bacteremia  Systemic lupus erythematosus  Aphonia  H/O tubal ligation  H/O appendicitis  Gall bladder stones  H/O tracheostomy  S/P percutaneous endoscopic gastrostomy (PEG) tube placement  Past surgeries: PEGT, BTL, tracheostomy, appendectomy    Family Hx: not obtainable secondary to AMS unresponsive     Social Hx: h/o drug abuse, as per patient's brother and prior hospital notes    MEDICATIONS  (STANDING):  amLODIPine   Tablet 10 milliGRAM(s) Oral daily  artificial  tears Solution 1 Drop(s) Both EYES every 2 hours  atovaquone  Suspension 1500 milliGRAM(s) Oral daily  cloNIDine 0.1 milliGRAM(s) Oral daily  heparin   Injectable 5000 Unit(s) SubCutaneous every 12 hours  hydrALAZINE 25 milliGRAM(s) Oral three times a day  hydroxychloroquine 200 milliGRAM(s) Oral daily  pantoprazole    Tablet 40 milliGRAM(s) Oral before breakfast  phenytoin   Capsule 100 milliGRAM(s) Oral at bedtime  piperacillin/tazobactam IVPB.. 3.375 Gram(s) IV Intermittent every 12 hours  predniSONE   Tablet 50 milliGRAM(s) Oral daily     Allergies: morphine (Rash)    ROS: Unable to obtain d/t patient's condition    Vital Signs Last 24 Hrs  T(C): 37.2 (19 Jan 2022 08:00), Max: 37.2 (19 Jan 2022 08:00)  T(F): 98.9 (19 Jan 2022 08:00), Max: 98.9 (19 Jan 2022 08:00)  HR: 108 (19 Jan 2022 08:00) (108 - 122)  BP: 159/96 (19 Jan 2022 08:00) (155/81 - 176/89)  BP(mean): 110 (19 Jan 2022 08:00) (95 - 123)  RR: 28 (19 Jan 2022 08:00) (16 - 41)  SpO2: 99% (19 Jan 2022 08:00) (93% - 100%)    Physical Exam:    General: Normocephalic, NAD  HEENT: PERRLA  Neck: Supple.  Respiratory: Breath sounds are clear bilaterally  Cardiovascular: S1 and S2, regular   Extremities:  no edema  Vascular: Carotid Bruit - no  Skin: No rashes    Neurological exam:  HF: Somnolent but awakens with repeated verbal stimulation, unable to assess speech or affect as she will not speak, follows only certain commands (smiled when asked)  CN: LAYTON, no NLFD  Motor: not cooperative with testing, resisting passive movements  Sens: withdraws to painful stimuli   Reflexes: Symmetric 0-1 throughout, downgoing toes b/l  Coord:  Could not assess  Gait/Balance: Cannot test      Labs:   01-18    130<L>  |  92<L>  |  72<H>  ----------------------------<  84  5.1   |  23  |  9.36<H>    Ca    9.4      18 Jan 2022 14:06  Mg     3.6     01-18    TPro  8.5<H>  /  Alb  2.9<L>  /  TBili  0.5  /  DBili  x   /  AST  24  /  ALT  19  /  AlkPhos  162<H>  01-18                 13.2   29.70 )-----------( 183      ( 18 Jan 2022 14:06 )             40.7   Phenytoin Level, Serum: 1.6 ug/mL (01.18.22 @ 17:17)  THC, Urine Qualitative: Positive (01.18.22 @ 14:06)   Cocaine Metabolite, Urine: Positive (01.18.22 @ 14:06)     Radiology:   CT Head and CT Cspine No Cont (01.18.22 @ 15:44)     IMPRESSION: No evidence of acute hemorrhage mass effect in posterior   fossa or supratentorial region.    No fracture or dislocation involving the cervical spine.    EEG w/ Video Each 12-26 Hours, Unmonitored (12.17.21 @ 10:55)   Impression: Normal 24 hour video EEG recording. A normal study does not   rule out the underlying presence of epilepsy. If clinically indicated, a   repeat studies recommended.    EEG Awake or Drowsy (12.15.21 @ 16:57)  Impression: Abnormal EEG because of slowing of the background activity,   delta range activity, consistent with a diffuse cerebral dysfunction,   maybe on the basis of a diffuse metabolic, toxic or structural   abnormality.  Generalized and transition epileptiform discharges with are potentially   epileptogenic.  Above findings can be seen in pulse 18 gentle states, abnormality   encephalopathies. Clinical correlation recommended. A prolonged, video   EEG is recommended to further clarify abnormalities.

## 2022-01-19 NOTE — CONSULT NOTE ADULT - SUBJECTIVE AND OBJECTIVE BOX
Patient is a 41y old  Female who presents with a chief complaint of AMS, sepsis, ESKD (2022 11:50)    HPI:  40 y/o F PMx significant for MRSA bacteremia, COPD, Hypertension, ESRD on hemodialysis (MWF) (kidney bx c/w ANCA vasculitis), cocaine and heroin abuse, depression, GERD, epilepsy, Raynaud's syndrome, Rheumatoid arthritis, SLE, seizure disorder, hospitalized 12/15/21 through 21 for seizure, presented in the ER via EMS  for AMS. The patient family found her on the floor unresponsive after they left her alone for an hour. The patient respond only to painful stimulation. The patient was evaluated by ICU practitioner and recommended CICU admission. Here noted with elevated wbc ct 29, LA 5.7, imaging remarkable for new subtle compression deformities of superior t5/t6 endplates, RUL bronchiolitis, LLL atelectasis, UA with pyuria, b/l perinephric stranding noted on CT, was given IV vanco/zosyn, is on steroids, on mepron for pcp prophlyaxis.       PMHx: as above  PSHx: PEGT, BTL, tracheostomy, appendectomy    Meds: per reconciliation sheet, noted below  MEDICATIONS  (STANDING):  amLODIPine   Tablet 10 milliGRAM(s) Oral daily  artificial  tears Solution 1 Drop(s) Both EYES every 2 hours  atovaquone  Suspension 1500 milliGRAM(s) Oral daily  cloNIDine 0.1 milliGRAM(s) Oral daily  fosphenytoin IVPB 1000 milliGRAM(s) PE IV Intermittent once  heparin   Injectable 5000 Unit(s) SubCutaneous every 12 hours  hydrALAZINE 25 milliGRAM(s) Oral three times a day  hydroxychloroquine 200 milliGRAM(s) Oral daily  pantoprazole    Tablet 40 milliGRAM(s) Oral before breakfast  phenytoin   Capsule 300 milliGRAM(s) Oral at bedtime  piperacillin/tazobactam IVPB.. 3.375 Gram(s) IV Intermittent every 12 hours  predniSONE   Tablet 50 milliGRAM(s) Oral daily      Allergies    morphine (Rash)    Intolerances      Social: no smoking, no alcohol, no illegal drugs; no recent travel, no exposure to TB  FAMILY HISTORY:  Family history of cardiomyopathy (Mother)       no history of premature cardiovascular disease in first degree relatives    ROS: unable to obtain d/t medical condition    All other systems reviewed and are negative    Vital Signs Last 24 Hrs  T(C): 37.4 (2022 11:15), Max: 37.4 (2022 11:15)  T(F): 99.4 (2022 11:15), Max: 99.4 (2022 11:15)  HR: 119 (2022 12:30) (108 - 126)  BP: 122/92 (2022 12:30) (122/92 - 176/89)  BP(mean): 103 (2022 12:30) (95 - 133)  RR: 38 (2022 12:30) (16 - 41)  SpO2: 97% (2022 12:30) (93% - 100%)  Daily Height in cm: 165.1 (2022 13:55)    Daily Weight in k.9 (2022 11:15)    PE:  Constitutional: poorly responsive   HEENT: NC/AT, EOMI, PERRLA, conjunctivae clear; ears and nose atraumatic; pharynx benign  Neck: supple; thyroid not palpable  Back: no tenderness  Respiratory: decreased breath sounds   Cardiovascular: S1S2 regular, no murmurs  Abdomen: soft, not tender, not distended, positive BS; liver and spleen WNL  Genitourinary: no suprapubic tenderness  Lymphatic: no LN palpable  Musculoskeletal: no muscle tenderness, no joint swelling or tenderness  Extremities: no pedal edema  Neurological/ Psychiatric: no focal deficits  Skin: no rashes; no palpable lesions perm catheter in place    Labs: all available labs reviewed                        11.7    )-----------( 188      ( 2022 11:15 )             36.0         134<L>  |  97  |  76<H>  ----------------------------<  67<L>  4.5   |  16<L>  |  9.10<H>    Ca    8.9      2022 11:15  Mg     3.6         TPro  8.5<H>  /  Alb  2.9<L>  /  TBili  0.5  /  DBili  x   /  AST  24  /  ALT  19  /  AlkPhos  162<H>  18     LIVER FUNCTIONS - ( 2022 14:06 )  Alb: 2.9 g/dL / Pro: 8.5 gm/dL / ALK PHOS: 162 U/L / ALT: 19 U/L / AST: 24 U/L / GGT: x           Urinalysis Basic - ( 2022 14:06 )    Color: Yellow / Appearance: Slightly Turbid / S.010 / pH: x  Gluc: x / Ketone: Trace  / Bili: Negative / Urobili: Negative   Blood: x / Protein: 500 mg/dL / Nitrite: Negative   Leuk Esterase: Trace / RBC: 25-50 /HPF / WBC 6-10   Sq Epi: x / Non Sq Epi: Occasional / Bacteria: Few          Radiology: all available radiological tests reviewed  < from: CT Abdomen and Pelvis No Cont (22 @ 15:52) >  ACC: [id number] EXAM:  CT ABDOMEN AND PELVIS                        ACC: [id number] EXAM:  CT CHEST                          PROCEDURE DATE:  2022          INTERPRETATION:  CLINICAL INFORMATION: Status post fall.    COMPARISON: Noncontrast CT of the thorax dated 2021 and   noncontrast CT of the abdomen and pelvis dated 2021.    CONTRAST/COMPLICATIONS:  IV Contrast: NONE  Oral Contrast: NONE  Complications: None reported at time of study completion    PROCEDURE:  CT of the Chest, Abdomen and Pelvis was performed.  Sagittal and coronal reformats were performed.    FINDINGS:  CHEST:  LUNGS AND LARGE AIRWAYS: Patent central airways. Trace tree-in-bud   nodularity in the right upper lobe posterolaterally, compatible with   terminal bronchiolitis. Minimal dependent atelectasis in the left lower   lobe.  PLEURA: No pleural effusion. No pneumothorax or pneumomediastinum.  VESSELS: Right internal jugular dialysis catheter extending to the right   atrial/IVC junction. Within normal limits.  HEART: Heart size is normal. No pericardial effusion.  MEDIASTINUM AND MARY JANE: No lymphadenopathy. Shotty bilateral axillary and   subpectoral lymph nodes.  CHEST WALL AND LOWER NECK: Within normal limits.    ABDOMEN AND PELVIS:  LIVER: Within normal limits.  BILE DUCTS: Normal caliber.  GALLBLADDER: Removed.  SPLEEN: Within normal limits.  PANCREAS: Within normal limits.  ADRENALS: Within normal limits.  KIDNEYS/URETERS: Moderate bilateral perinephric stranding. Otherwise,   within normal limits.    BLADDER: Collapsed.  REPRODUCTIVE ORGANS: Uterus and adnexa within normal limits.    BOWEL: Fluid-filled stomach and proximal duodenum. There is mild diffuse   fluid distention of small bowel loops. The colon is predominantly   collapsed. No bowel obstruction.  PERITONEUM: No ascites.  VESSELS: Within normal limits.  RETROPERITONEUM/LYMPH NODES: Again are noted enlarged left para-aortic   lymph nodes measuring up to 2.2 x 1.7 cm on image 93.  ABDOMINAL WALL: Within normal limits.  BONES: New subtle compression deformities of the superior T5 and T6   endplates. Bilateral shoulder effusions. Erosive changes of the left   humeral head. Degenerative joint space narrowing of the right hip.    IMPRESSION: New subtle compression deformities of the superior T5 and T6   endplates. No evidence of visceral organ injury.          Advanced directives addressed: full resuscitation

## 2022-01-19 NOTE — CHART NOTE - NSCHARTNOTEFT_GEN_A_CORE
Called by RN re: decreased O2 sat  Pt had HD and earlier POCUS did not show signs of fluid overload  suspect aspiration vs inability to maintain airway  check abg and consult ICU Called by RN re: decreased O2 sat  Pt had HD and earlier POCUS did not show signs of fluid overload  suspect aspiration/ prolonged postictal state  vs inability to maintain airway   check abg and consult ICU; received Cerebyx this am  case d/w contact listed Mr Crane- he thinks pt had a seizure she seemed postictal when he found her; she was c/o gen body aches before he last saw her; she uses wheelchair to get around, I inquired about the left left  contracture.  ABG, CXR, BIPAP, NPO

## 2022-01-19 NOTE — CHART NOTE - NSCHARTNOTEFT_GEN_A_CORE
Remains somnolent and tachycardic  Suspect Clonidine wd  dc PO and start Clonidine patch  dc hydralazine PO, unable to take BP normal  dc Prednisone and start IV steroids ( 4mg methylpred = 5mg prednisone)  PRN Lopressor IV for extreme tachy case d/w RN

## 2022-01-19 NOTE — CONSULT NOTE ADULT - ATTENDING COMMENTS
Discussed care plan with resident. Agree with plan. D/W Nursing.
Patient with known history of previous seizures, substance abuse, presenting with altered mental status.  No seizure witnessed but patient found unresponsive, foaming at mouth.  The patient is unable to give history at this time and is not cooperative with examination.    Although her brother reports that seizures occurred with drug use, an EEG during a previous admission did show underlying epileptiform activity.  It is unclear why Keppra was changed to Dilantin during last admission.  Will continue Dilantin for now, but would eventually chose another medication given long term side effects of Dilantin.     -Would give fosphenytoin 1 gram IV after dialysis and then continue 300 mg qhs on following days.  -Will check phenytoin level in AM.  -Will get EEG to r/o ongoing seizures as cause of altered mental status.  -Treatment of infection as per medicine.

## 2022-01-20 LAB
ANION GAP SERPL CALC-SCNC: 17 MMOL/L — SIGNIFICANT CHANGE UP (ref 5–17)
APPEARANCE CSF: CLEAR — SIGNIFICANT CHANGE UP
APPEARANCE SPUN FLD: COLORLESS — SIGNIFICANT CHANGE UP
BASE EXCESS BLDA CALC-SCNC: 2.2 MMOL/L — SIGNIFICANT CHANGE UP (ref -2–3)
BLOOD GAS COMMENTS ARTERIAL: SIGNIFICANT CHANGE UP
BUN SERPL-MCNC: 51 MG/DL — HIGH (ref 7–23)
CALCIUM SERPL-MCNC: 8.6 MG/DL — SIGNIFICANT CHANGE UP (ref 8.5–10.1)
CHLORIDE SERPL-SCNC: 92 MMOL/L — LOW (ref 96–108)
CO2 BLDA-SCNC: 26 MMOL/L — HIGH (ref 19–24)
CO2 SERPL-SCNC: 25 MMOL/L — SIGNIFICANT CHANGE UP (ref 22–31)
COLOR CSF: SIGNIFICANT CHANGE UP
CREAT SERPL-MCNC: 6.37 MG/DL — HIGH (ref 0.5–1.3)
CSF PCR RESULT: SIGNIFICANT CHANGE UP
GAS PNL BLDA: SIGNIFICANT CHANGE UP
GLUCOSE CSF-MCNC: 101 MG/DL — HIGH (ref 40–70)
GLUCOSE SERPL-MCNC: 161 MG/DL — HIGH (ref 70–99)
GRAM STN FLD: SIGNIFICANT CHANGE UP
HCO3 BLDA-SCNC: 25 MMOL/L — SIGNIFICANT CHANGE UP (ref 21–28)
HCT VFR BLD CALC: 39.4 % — SIGNIFICANT CHANGE UP (ref 34.5–45)
HGB BLD-MCNC: 12.1 G/DL — SIGNIFICANT CHANGE UP (ref 11.5–15.5)
HOROWITZ INDEX BLDA+IHG-RTO: 70 — SIGNIFICANT CHANGE UP
LACTATE SERPL-SCNC: 1.4 MMOL/L — SIGNIFICANT CHANGE UP (ref 0.7–2)
MCHC RBC-ENTMCNC: 28.3 PG — SIGNIFICANT CHANGE UP (ref 27–34)
MCHC RBC-ENTMCNC: 30.7 GM/DL — LOW (ref 32–36)
MCV RBC AUTO: 92.3 FL — SIGNIFICANT CHANGE UP (ref 80–100)
NEUTROPHILS # CSF: SIGNIFICANT CHANGE UP (ref 0–6)
NRBC NFR CSF: <1 — SIGNIFICANT CHANGE UP (ref 0–5)
PCO2 BLDA: 33 MMHG — SIGNIFICANT CHANGE UP (ref 32–35)
PH BLDA: 7.49 — HIGH (ref 7.35–7.45)
PHENYTOIN FREE SERPL-MCNC: 25 UG/ML — HIGH (ref 10–20)
PLATELET # BLD AUTO: 364 K/UL — SIGNIFICANT CHANGE UP (ref 150–400)
PO2 BLDA: 51 MMHG — LOW (ref 83–108)
POTASSIUM SERPL-MCNC: 4.7 MMOL/L — SIGNIFICANT CHANGE UP (ref 3.5–5.3)
POTASSIUM SERPL-SCNC: 4.7 MMOL/L — SIGNIFICANT CHANGE UP (ref 3.5–5.3)
PROT CSF-MCNC: 26 MG/DL — SIGNIFICANT CHANGE UP (ref 15–45)
RBC # BLD: 4.27 M/UL — SIGNIFICANT CHANGE UP (ref 3.8–5.2)
RBC # CSF: 0 /UL — SIGNIFICANT CHANGE UP (ref 0–0)
RBC # FLD: 15.5 % — HIGH (ref 10.3–14.5)
SAO2 % BLDA: 84 % — SIGNIFICANT CHANGE UP
SODIUM SERPL-SCNC: 134 MMOL/L — LOW (ref 135–145)
SPECIMEN SOURCE: SIGNIFICANT CHANGE UP
TUBE TYPE: SIGNIFICANT CHANGE UP
WBC # BLD: 17.47 K/UL — HIGH (ref 3.8–10.5)
WBC # FLD AUTO: 17.47 K/UL — HIGH (ref 3.8–10.5)

## 2022-01-20 PROCEDURE — 99291 CRITICAL CARE FIRST HOUR: CPT

## 2022-01-20 PROCEDURE — 71045 X-RAY EXAM CHEST 1 VIEW: CPT | Mod: 26

## 2022-01-20 PROCEDURE — 95720 EEG PHY/QHP EA INCR W/VEEG: CPT

## 2022-01-20 PROCEDURE — 99232 SBSQ HOSP IP/OBS MODERATE 35: CPT

## 2022-01-20 PROCEDURE — 99233 SBSQ HOSP IP/OBS HIGH 50: CPT

## 2022-01-20 RX ORDER — FOSPHENYTOIN 50 MG/ML
200 INJECTION INTRAMUSCULAR; INTRAVENOUS
Refills: 0 | Status: DISCONTINUED | OUTPATIENT
Start: 2022-01-20 | End: 2022-01-30

## 2022-01-20 RX ORDER — SODIUM BICARBONATE 1 MEQ/ML
0.13 SYRINGE (ML) INTRAVENOUS
Qty: 150 | Refills: 0 | Status: DISCONTINUED | OUTPATIENT
Start: 2022-01-20 | End: 2022-01-20

## 2022-01-20 RX ORDER — SODIUM CHLORIDE 9 MG/ML
1000 INJECTION INTRAMUSCULAR; INTRAVENOUS; SUBCUTANEOUS
Refills: 0 | Status: DISCONTINUED | OUTPATIENT
Start: 2022-01-20 | End: 2022-01-24

## 2022-01-20 RX ORDER — PROPOFOL 10 MG/ML
10 INJECTION, EMULSION INTRAVENOUS
Qty: 1000 | Refills: 0 | Status: DISCONTINUED | OUTPATIENT
Start: 2022-01-20 | End: 2022-01-24

## 2022-01-20 RX ORDER — SODIUM BICARBONATE 1 MEQ/ML
50 SYRINGE (ML) INTRAVENOUS ONCE
Refills: 0 | Status: COMPLETED | OUTPATIENT
Start: 2022-01-20 | End: 2022-01-20

## 2022-01-20 RX ORDER — ACETAMINOPHEN 500 MG
750 TABLET ORAL ONCE
Refills: 0 | Status: COMPLETED | OUTPATIENT
Start: 2022-01-20 | End: 2022-01-20

## 2022-01-20 RX ORDER — CHLORHEXIDINE GLUCONATE 213 G/1000ML
15 SOLUTION TOPICAL EVERY 12 HOURS
Refills: 0 | Status: DISCONTINUED | OUTPATIENT
Start: 2022-01-20 | End: 2022-01-25

## 2022-01-20 RX ADMIN — PIPERACILLIN AND TAZOBACTAM 25 GRAM(S): 4; .5 INJECTION, POWDER, LYOPHILIZED, FOR SOLUTION INTRAVENOUS at 11:30

## 2022-01-20 RX ADMIN — ONDANSETRON 4 MILLIGRAM(S): 8 TABLET, FILM COATED ORAL at 05:24

## 2022-01-20 RX ADMIN — Medication 1 DROP(S): at 23:44

## 2022-01-20 RX ADMIN — PANTOPRAZOLE SODIUM 40 MILLIGRAM(S): 20 TABLET, DELAYED RELEASE ORAL at 11:30

## 2022-01-20 RX ADMIN — Medication 50 MILLIGRAM(S): at 05:23

## 2022-01-20 RX ADMIN — Medication 2 MILLIGRAM(S): at 08:15

## 2022-01-20 RX ADMIN — AMLODIPINE BESYLATE 10 MILLIGRAM(S): 2.5 TABLET ORAL at 11:30

## 2022-01-20 RX ADMIN — CHLORHEXIDINE GLUCONATE 15 MILLILITER(S): 213 SOLUTION TOPICAL at 11:31

## 2022-01-20 RX ADMIN — Medication 40 MEQ/KG/HR: at 04:26

## 2022-01-20 RX ADMIN — PROPOFOL 2.86 MICROGRAM(S)/KG/MIN: 10 INJECTION, EMULSION INTRAVENOUS at 22:20

## 2022-01-20 RX ADMIN — Medication 1 DROP(S): at 02:00

## 2022-01-20 RX ADMIN — FOSPHENYTOIN 108 MILLIGRAM(S) PE: 50 INJECTION INTRAMUSCULAR; INTRAVENOUS at 23:29

## 2022-01-20 RX ADMIN — CHLORHEXIDINE GLUCONATE 15 MILLILITER(S): 213 SOLUTION TOPICAL at 21:31

## 2022-01-20 RX ADMIN — Medication 1 PATCH: at 06:19

## 2022-01-20 RX ADMIN — Medication 300 MILLIGRAM(S): at 04:26

## 2022-01-20 RX ADMIN — Medication 1 DROP(S): at 00:00

## 2022-01-20 RX ADMIN — PIPERACILLIN AND TAZOBACTAM 25 GRAM(S): 4; .5 INJECTION, POWDER, LYOPHILIZED, FOR SOLUTION INTRAVENOUS at 21:30

## 2022-01-20 RX ADMIN — Medication 1 DROP(S): at 11:29

## 2022-01-20 RX ADMIN — Medication 2 MILLIGRAM(S): at 13:56

## 2022-01-20 RX ADMIN — Medication 50 MILLIEQUIVALENT(S): at 04:26

## 2022-01-20 RX ADMIN — Medication 2.5 MILLIGRAM(S): at 19:53

## 2022-01-20 NOTE — PROGRESS NOTE ADULT - SUBJECTIVE AND OBJECTIVE BOX
Date of service: 22 @ 14:00    pt seen and examined  intubated now, on ventilatory support  encephalopathic  has fevers up to 102/103    ROS: unable to obtain d/t medical condition     MEDICATIONS  (STANDING):  amLODIPine   Tablet 10 milliGRAM(s) Oral daily  artificial  tears Solution 1 Drop(s) Both EYES two times a day  atovaquone  Suspension 1500 milliGRAM(s) Oral daily  chlorhexidine 0.12% Liquid 15 milliLiter(s) Oral Mucosa every 12 hours  cloNIDine Patch 0.1 mG/24Hr(s) 1 patch Transdermal every 7 days  hydroxychloroquine 200 milliGRAM(s) Oral daily  methylPREDNISolone sodium succinate Injectable 50 milliGRAM(s) IV Push daily  pantoprazole    Tablet 40 milliGRAM(s) Oral before breakfast  piperacillin/tazobactam IVPB.. 3.375 Gram(s) IV Intermittent every 12 hours  vancomycin  IVPB 500 milliGRAM(s) IV Intermittent <User Schedule>    Vital Signs Last 24 Hrs  T(C): 37.6 (2022 08:30), Max: 39.1 (2022 03:32)  T(F): 99.7 (2022 08:30), Max: 102.3 (2022 03:32)  HR: 119 (2022 13:00) (110 - 127)  BP: 126/84 (2022 13:00) (41/15 - 151/99)  BP(mean): 94 (2022 13:00) (22 - 113)  RR: 30 (2022 13:00) (27 - 57)  SpO2: 98% (2022 13:00) (87% - 100%)    PE:  Constitutional: sedated/intubated   HEENT: NC/AT, EOMI, PERRLA, conjunctivae clear; ears and nose atraumatic; pharynx benign  Neck: supple; thyroid not palpable  Back: no tenderness  Respiratory: decreased breath sounds   Cardiovascular: S1S2 regular, no murmurs  Abdomen: soft, not tender, not distended, positive BS; liver and spleen WNL  Genitourinary: no suprapubic tenderness  Lymphatic: no LN palpable  Musculoskeletal: no muscle tenderness, no joint swelling or tenderness  Extremities: no pedal edema  Neurological/ Psychiatric: no focal deficits  Skin: no rashes; no palpable lesions perm catheter in place    Labs: all available labs reviewed                                   12.1   17.47 )-----------( 364      ( 2022 07:51 )             39.4         134<L>  |  92<L>  |  51<H>  ----------------------------<  161<H>  4.7   |  25  |  6.37<H>    Ca    8.6      2022 07:51  Mg     3.6         TPro  8.5<H>  /  Alb  2.9<L>  /  TBili  0.5  /  DBili  x   /  AST  24  /  ALT  19  /  AlkPhos  162<H>          LIVER FUNCTIONS - ( 2022 14:06 )  Alb: 2.9 g/dL / Pro: 8.5 gm/dL / ALK PHOS: 162 U/L / ALT: 19 U/L / AST: 24 U/L / GGT: x           Urinalysis Basic - ( 2022 14:06 )    Color: Yellow / Appearance: Slightly Turbid / S.010 / pH: x  Gluc: x / Ketone: Trace  / Bili: Negative / Urobili: Negative   Blood: x / Protein: 500 mg/dL / Nitrite: Negative   Leuk Esterase: Trace / RBC: 25-50 /HPF / WBC 6-10   Sq Epi: x / Non Sq Epi: Occasional / Bacteria: Few    Culture - Blood (22 @ 17:17)   Specimen Source: .Blood None   Culture Results:   No growth to date.   Culture - Blood (22 @ 14:06)   Specimen Source: .Blood Blood-Peripheral   Culture Results:   No growth to date.   Culture - Urine (22 @ 14:06)   Specimen Source: Clean Catch Clean Catch (Midstream)   Culture Results:   <10,000 CFU/mL Normal Urogenital Dania     Radiology: all available radiological tests reviewed    ACC: [id number] EXAM:  CT ABDOMEN AND PELVIS                        ACC: [id number] EXAM:  CT CHEST                          PROCEDURE DATE:  2022          INTERPRETATION:  CLINICAL INFORMATION: Status post fall.    COMPARISON: Noncontrast CT of the thorax dated 2021 and   noncontrast CT of the abdomen and pelvis dated 2021.    CONTRAST/COMPLICATIONS:  IV Contrast: NONE  Oral Contrast: NONE  Complications: None reported at time of study completion    PROCEDURE:  CT of the Chest, Abdomen and Pelvis was performed.  Sagittal and coronal reformats were performed.    FINDINGS:  CHEST:  LUNGS AND LARGE AIRWAYS: Patent central airways. Trace tree-in-bud   nodularity in the right upper lobe posterolaterally, compatible with   terminal bronchiolitis. Minimal dependent atelectasis in the left lower   lobe.  PLEURA: No pleural effusion. No pneumothorax or pneumomediastinum.  VESSELS: Right internal jugular dialysis catheter extending to the right   atrial/IVC junction. Within normal limits.  HEART: Heart size is normal. No pericardial effusion.  MEDIASTINUM AND MARY JANE: No lymphadenopathy. Shotty bilateral axillary and   subpectoral lymph nodes.  CHEST WALL AND LOWER NECK: Within normal limits.    ABDOMEN AND PELVIS:  LIVER: Within normal limits.  BILE DUCTS: Normal caliber.  GALLBLADDER: Removed.  SPLEEN: Within normal limits.  PANCREAS: Within normal limits.  ADRENALS: Within normal limits.  KIDNEYS/URETERS: Moderate bilateral perinephric stranding. Otherwise,   within normal limits.    BLADDER: Collapsed.  REPRODUCTIVE ORGANS: Uterus and adnexa within normal limits.    BOWEL: Fluid-filled stomach and proximal duodenum. There is mild diffuse   fluid distention of small bowel loops. The colon is predominantly   collapsed. No bowel obstruction.  PERITONEUM: No ascites.  VESSELS: Within normal limits.  RETROPERITONEUM/LYMPH NODES: Again are noted enlarged left para-aortic   lymph nodes measuring up to 2.2 x 1.7 cm on image 93.  ABDOMINAL WALL: Within normal limits.  BONES: New subtle compression deformities of the superior T5 and T6   endplates. Bilateral shoulder effusions. Erosive changes of the left   humeral head. Degenerative joint space narrowing of the right hip.    IMPRESSION: New subtle compression deformities of the superior T5 and T6   endplates. No evidence of visceral organ injury.          Advanced directives addressed: full resuscitation

## 2022-01-20 NOTE — CONSULT NOTE ADULT - SUBJECTIVE AND OBJECTIVE BOX
Patient is a 41y old  Female who presents with a chief complaint of AMS, sepsis, ESKD (2022 12:57)      BRIEF HOSPITAL COURSE:       Events last 24 hours:       PAST MEDICAL & SURGICAL HISTORY:  Rheumatoid arthritis  H/O Raynaud&#x27;s syndrome  Heroin abuse  Smoker  Sepsis  HTN (hypertension)  COPD (chronic obstructive pulmonary disease)  Depression  Epilepsy  GERD (gastroesophageal reflux disease)  Bacteremia  Systemic lupus erythematosus  Aphonia  H/O tubal ligation  H/O appendicitis  Gall bladder stones  H/O tracheostomy  S/P percutaneous endoscopic gastrostomy (PEG) tube placement      Allergies  morphine (Rash)      FAMILY HISTORY:  Family history of cardiomyopathy (Mother)      Social History:   Illicit drug use       Review of Systems:  Unable to obtain       Physical Examination:    General: Lethargic     HEENT: eeg lead in place, ventimask in place    PULM: +tachypenic,Symmetrical thorax expansion, coarse to auscultation bilaterally, +thick secretions     CVS: Regular rate and rhythm, no murmurs, rubs, or gallops    ABD: Soft, nondistended, nontender, normoactive bowel sounds, no masses appreciated    EXT: No edema, nontender    SKIN: Warm and well perfused, no rashes noted.    NEURO: lethargic      Medications:  atovaquone  Suspension 1500 milliGRAM(s) Oral daily  hydroxychloroquine 200 milliGRAM(s) Oral daily  piperacillin/tazobactam IVPB.. 3.375 Gram(s) IV Intermittent every 12 hours  vancomycin  IVPB 500 milliGRAM(s) IV Intermittent <User Schedule>  amLODIPine   Tablet 10 milliGRAM(s) Oral daily  cloNIDine Patch 0.1 mG/24Hr(s) 1 patch Transdermal every 7 days  metoprolol tartrate Injectable 2.5 milliGRAM(s) IV Push every 6 hours PRN  ALBUTerol    90 MICROgram(s) HFA Inhaler 2 Puff(s) Inhalation every 6 hours PRN  acetaminophen     Tablet .. 650 milliGRAM(s) Oral every 6 hours PRN  fosphenytoin IVPB 300 milliGRAM(s) PE IV Intermittent <User Schedule>  melatonin 3 milliGRAM(s) Oral at bedtime PRN  ondansetron Injectable 4 milliGRAM(s) IV Push every 8 hours PRN  aluminum hydroxide/magnesium hydroxide/simethicone Suspension 30 milliLiter(s) Oral every 4 hours PRN  pantoprazole    Tablet 40 milliGRAM(s) Oral before breakfast  simethicone 80 milliGRAM(s) Chew three times a day PRN  methylPREDNISolone sodium succinate Injectable 50 milliGRAM(s) IV Push daily  sodium bicarbonate  Infusion 0.126 mEq/kG/Hr IV Continuous <Continuous>  artificial  tears Solution 1 Drop(s) Both EYES every 2 hours      ICU Vital Signs Last 24 Hrs  T(C): 39.1 (2022 03:32), Max: 39.1 (2022 03:32)  T(F): 102.3 (2022 03:32), Max: 102.3 (2022 03:32)  HR: 115 (2022 03:00) (108 - 127)  BP: 102/85 (2022 03:00) (84/61 - 167/119)  BP(mean): 92 (2022 03:00) (69 - 133)  ABP: --  ABP(mean): --  RR: 52 (2022 03:00) (26 - 57)  SpO2: 97% (2022 03:00) (87% - 99%)    Vital Signs Last 24 Hrs  T(C): 39.1 (2022 03:32), Max: 39.1 (2022 03:32)  T(F): 102.3 (2022 03:32), Max: 102.3 (2022 03:32)  HR: 115 (2022 03:00) (108 - 127)  BP: 102/85 (2022 03:00) (84/61 - 167/119)  BP(mean): 92 (2022 03:00) (69 - 133)  RR: 52 (2022 03:00) (26 - 57)  SpO2: 97% (2022 03:00) (87% - 99%)    ABG - ( 2022 18:01 )  pH, Arterial: 7.56  pH, Blood: x     /  pCO2: 18    /  pO2: 70    / HCO3: 16    / Base Excess: -3.5  /  SaO2: 97          I&O's Detail    2022 07:01  -  2022 05:31  --------------------------------------------------------  IN:  Total IN: 0 mL    OUT:    Other (mL): 1500 mL  Total OUT: 1500 mL  Total NET: -1500 mL      LABS:                        11.7    )-----------( 188      ( 2022 11:15 )             36.0         132<L>  |  93<L>  |  32<H>  ----------------------------<  91  4.5   |  14<L>  |  4.96<H>    Ca    9.6      2022 21:27  Mg     3.6         TPro  8.5<H>  /  Alb  2.9<L>  /  TBili  0.5  /  DBili  x   /  AST  24  /  ALT  19  /  AlkPhos  162<H>        CARDIAC MARKERS ( 2022 14:06 )  x     / x     / 39 U/L / x     / x          CAPILLARY BLOOD GLUCOSE  POCT Blood Glucose.: 77 mg/dL (2022 13:56)      PT/INR - ( 2022 14:06 )   PT: 14.9 sec;   INR: 1.30 ratio    PTT - ( 2022 14:06 )  PTT:39.9 sec      Urinalysis Basic - ( 2022 14:06 )  Color: Yellow / Appearance: Slightly Turbid / S.010 / pH: x  Gluc: x / Ketone: Trace  / Bili: Negative / Urobili: Negative   Blood: x / Protein: 500 mg/dL / Nitrite: Negative   Leuk Esterase: Trace / RBC: 25-50 /HPF / WBC 6-10   Sq Epi: x / Non Sq Epi: Occasional / Bacteria: Few      CULTURES:  Culture Results:   No growth to date. ( @ 17:17)  Culture Results:   <10,000 CFU/mL Normal Urogenital Dania ( @ 14:06)  Culture Results:   No growth to date. ( @ 14:06)    RADIOLOGY:   < from: CT Angio Chest PE Protocol w/ IV Cont (22 @ 20:17) >    ACC: [id number] EXAM:  CT ANGIO CHEST PULM ART [facility] [place]                          PROCEDURE DATE:  2022      INTERPRETATION:  CLINICAL INFORMATION: Seizure.    COMPARISON: Noncontrast CT of the thorax dated 2022.    CONTRAST/COMPLICATIONS:  IV Contrast: Omnipaque 350  50 cc administered   50 cc discarded  Oral Contrast: NONE  Complications: None reported at time of study completion    PROCEDURE:  CT Angiography of the Chest was performed as per clinician request.  Sagittal and coronal reformats were performed as well as 3D (MIP)   reconstructions.    FINDINGS:    LUNGS AND AIRWAYS: There is moderate mucoid debris throughout the   bronchus intermedius and bilateral lower lobe bronchi with tree-in-bud   nodularity, right greater than left, compatible with aspiration or   colitis. No confluent airspace opacity is identified.  PLEURA: No pleural effusion. No pneumothorax or pneumomediastinum.  MEDIASTINUM AND MARY JANE: No lymphadenopathy.  VESSELS: There is no evidence of filling defect in the first, second, or   third order branches of the pulmonary arteries. The pulmonary trunk and   main pulmonary arteries are unremarkable. The thoracic aorta and great   vessels are unremarkable. Right internal jugular dialysis catheter   extending to the intrahepatic portion of the IVC.  HEART: Heart size is normal. No pericardial effusion.  CHEST WALL AND LOWER NECK: Within normal limits.  VISUALIZED UPPER ABDOMEN: The gallbladder has been removed. Again are   noted prominent left para-aortic lymph nodes.  BONES: Within normal limits.    IMPRESSION:  1. Mucoid debris in the lower lobe airways with associated bronchiolitis.  2. No evidence of pulmonary embolism.    --- End of Report ---    ONEL CLIFFORD MD; Attending Radiologist  This document has been electronically signed. 2022  8:32PM    < end of copied text >        SUPPLEMENTAL O2:   LINES:  IVF:  KAMALJIT:   PPx:   CONTACT: Patient is a 41y old  Female who presents with a chief complaint of AMS, sepsis, ESKD (2022 12:57)      BRIEF HOSPITAL COURSE:   40 yo f pmhx MRSA bacteremia, copd, htn, esrd on hd (MWF), kidney bx c/w ANCA vasculitis, cocaine and herion abuse, depression, gerd, raynauds, ra, sle, seizure disordered admitted after being found unresponsive.      Events last 24 hours:   called by rn overnight 2/2 patient hypoxic to the low 80s on venti mask, upon arrival to patient's bedside, she was hypoxic, gurgling in secretions, breathing at a rate of 40 bpm and obtunded.  Patient urgnetly transferred to CCU and intubated at that time.  Became hypotensive preintubation likely 2/2 sedation, required 4cc push of balta, levophed ordered.        PAST MEDICAL & SURGICAL HISTORY:  Rheumatoid arthritis  H/O Raynaud&#x27;s syndrome  Heroin abuse  Smoker  Sepsis  HTN (hypertension)  COPD (chronic obstructive pulmonary disease)  Depression  Epilepsy  GERD (gastroesophageal reflux disease)  Bacteremia  Systemic lupus erythematosus  Aphonia  H/O tubal ligation  H/O appendicitis  Gall bladder stones  H/O tracheostomy  S/P percutaneous endoscopic gastrostomy (PEG) tube placement      Allergies  morphine (Rash)      FAMILY HISTORY:  Family history of cardiomyopathy (Mother)      Social History:   Illicit drug use       Review of Systems:  Unable to obtain       Physical Examination:    General: Lethargic     HEENT: eeg lead in place, ventimask in place    PULM: +tachypenic,Symmetrical thorax expansion, coarse to auscultation bilaterally, +thick secretions     CVS: Regular rate and rhythm, no murmurs, rubs, or gallops    ABD: Soft, nondistended, nontender, normoactive bowel sounds, no masses appreciated    EXT: No edema, nontender    SKIN: Warm and well perfused, no rashes noted.    NEURO: lethargic      Medications:  atovaquone  Suspension 1500 milliGRAM(s) Oral daily  hydroxychloroquine 200 milliGRAM(s) Oral daily  piperacillin/tazobactam IVPB.. 3.375 Gram(s) IV Intermittent every 12 hours  vancomycin  IVPB 500 milliGRAM(s) IV Intermittent <User Schedule>  amLODIPine   Tablet 10 milliGRAM(s) Oral daily  cloNIDine Patch 0.1 mG/24Hr(s) 1 patch Transdermal every 7 days  metoprolol tartrate Injectable 2.5 milliGRAM(s) IV Push every 6 hours PRN  ALBUTerol    90 MICROgram(s) HFA Inhaler 2 Puff(s) Inhalation every 6 hours PRN  acetaminophen     Tablet .. 650 milliGRAM(s) Oral every 6 hours PRN  fosphenytoin IVPB 300 milliGRAM(s) PE IV Intermittent <User Schedule>  melatonin 3 milliGRAM(s) Oral at bedtime PRN  ondansetron Injectable 4 milliGRAM(s) IV Push every 8 hours PRN  aluminum hydroxide/magnesium hydroxide/simethicone Suspension 30 milliLiter(s) Oral every 4 hours PRN  pantoprazole    Tablet 40 milliGRAM(s) Oral before breakfast  simethicone 80 milliGRAM(s) Chew three times a day PRN  methylPREDNISolone sodium succinate Injectable 50 milliGRAM(s) IV Push daily  sodium bicarbonate  Infusion 0.126 mEq/kG/Hr IV Continuous <Continuous>  artificial  tears Solution 1 Drop(s) Both EYES every 2 hours      ICU Vital Signs Last 24 Hrs  T(C): 39.1 (2022 03:32), Max: 39.1 (2022 03:32)  T(F): 102.3 (2022 03:32), Max: 102.3 (2022 03:32)  HR: 115 (2022 03:00) (108 - 127)  BP: 102/85 (2022 03:00) (84/61 - 167/119)  BP(mean): 92 (2022 03:00) (69 - 133)  ABP: --  ABP(mean): --  RR: 52 (2022 03:00) (26 - 57)  SpO2: 97% (2022 03:00) (87% - 99%)    Vital Signs Last 24 Hrs  T(C): 39.1 (2022 03:32), Max: 39.1 (2022 03:32)  T(F): 102.3 (2022 03:32), Max: 102.3 (2022 03:32)  HR: 115 (2022 03:00) (108 - 127)  BP: 102/85 (2022 03:00) (84/61 - 167/119)  BP(mean): 92 (2022 03:00) (69 - 133)  RR: 52 (2022 03:00) (26 - 57)  SpO2: 97% (2022 03:00) (87% - 99%)    ABG - ( 2022 18:01 )  pH, Arterial: 7.56  pH, Blood: x     /  pCO2: 18    /  pO2: 70    / HCO3: 16    / Base Excess: -3.5  /  SaO2: 97          I&O's Detail    2022 07:01  -  2022 05:31  --------------------------------------------------------  IN:  Total IN: 0 mL    OUT:    Other (mL): 1500 mL  Total OUT: 1500 mL  Total NET: -1500 mL      LABS:                        11.7   . )-----------( 188      ( 2022 11:15 )             36.0         132<L>  |  93<L>  |  32<H>  ----------------------------<  91  4.5   |  14<L>  |  4.96<H>    Ca    9.6      2022 21:27  Mg     3.6         TPro  8.5<H>  /  Alb  2.9<L>  /  TBili  0.5  /  DBili  x   /  AST  24  /  ALT  19  /  AlkPhos  162<H>  -18      CARDIAC MARKERS ( 2022 14:06 )  x     / x     / 39 U/L / x     / x          CAPILLARY BLOOD GLUCOSE  POCT Blood Glucose.: 77 mg/dL (2022 13:56)      PT/INR - ( 2022 14:06 )   PT: 14.9 sec;   INR: 1.30 ratio    PTT - ( 2022 14:06 )  PTT:39.9 sec      Urinalysis Basic - ( 2022 14:06 )  Color: Yellow / Appearance: Slightly Turbid / S.010 / pH: x  Gluc: x / Ketone: Trace  / Bili: Negative / Urobili: Negative   Blood: x / Protein: 500 mg/dL / Nitrite: Negative   Leuk Esterase: Trace / RBC: 25-50 /HPF / WBC 6-10   Sq Epi: x / Non Sq Epi: Occasional / Bacteria: Few      CULTURES:  Culture Results:   No growth to date. ( @ 17:17)  Culture Results:   <10,000 CFU/mL Normal Urogenital Dania ( @ 14:06)  Culture Results:   No growth to date. ( @ 14:06)    RADIOLOGY:   < from: CT Angio Chest PE Protocol w/ IV Cont (22 @ 20:17) >    ACC: [id number] EXAM:  CT ANGIO CHEST PULM ART [facility]                          PROCEDURE DATE:  2022      INTERPRETATION:  CLINICAL INFORMATION: Seizure.    COMPARISON: Noncontrast CT of the thorax dated 2022.    CONTRAST/COMPLICATIONS:  IV Contrast: Omnipaque 350  50 cc administered   50 cc discarded  Oral Contrast: NONE  Complications: None reported at time of study completion    PROCEDURE:  CT Angiography of the Chest was performed as per clinician request.  Sagittal and coronal reformats were performed as well as 3D (MIP)   reconstructions.    FINDINGS:    LUNGS AND AIRWAYS: There is moderate mucoid debris throughout the   bronchus intermedius and bilateral lower lobe bronchi with tree-in-bud   nodularity, right greater than left, compatible with aspiration or   colitis. No confluent airspace opacity is identified.  PLEURA: No pleural effusion. No pneumothorax or pneumomediastinum.  MEDIASTINUM AND MARY JANE: No lymphadenopathy.  VESSELS: There is no evidence of filling defect in the first, second, or   third order branches of the pulmonary arteries. The pulmonary trunk and   main pulmonary arteries are unremarkable. The thoracic aorta and great   vessels are unremarkable. Right internal jugular dialysis catheter   extending to the intrahepatic portion of the IVC.  HEART: Heart size is normal. No pericardial effusion.  CHEST WALL AND LOWER NECK: Within normal limits.  VISUALIZED UPPER ABDOMEN: The gallbladder has been removed. Again are   noted prominent left para-aortic lymph nodes.  BONES: Within normal limits.    IMPRESSION:  1. Mucoid debris in the lower lobe airways with associated bronchiolitis.  2. No evidence of pulmonary embolism.    --- End of Report ---    ONEL CLIFFORD MD; Attending Radiologist  This document has been electronically signed. 2022  8:32PM    < end of copied text >        SUPPLEMENTAL O2:   LINES:  IVF:  MARI:   PPx:   CONTACT:

## 2022-01-20 NOTE — DIETITIAN NUTRITION RISK NOTIFICATION - TREATMENT: THE FOLLOWING DIET HAS BEEN RECOMMENDED
Diet, NPO with Tube Feed:   Tube Feeding Modality: Orogastric  Nepro with Carb Steady (NEPRORTH)  Total Volume for 24 Hours (mL): 720  Continuous  Starting Tube Feed Rate {mL per Hour}: 20  Increase Tube Feed Rate by (mL): 10     Every 8 hours  Until Goal Tube Feed Rate (mL per Hour): 30  Tube Feed Duration (in Hours): 24  Tube Feed Start Time: 00:00  Free Water Flush Instructions:  As per MD  No Carb Prosource TF     Qty per Day:  4 (01-20-22 @ 09:40) [Pending Verification By Attending]  Diet, NPO (01-19-22 @ 17:44) [Active]

## 2022-01-20 NOTE — PROGRESS NOTE ADULT - SUBJECTIVE AND OBJECTIVE BOX
Events Overnight:  Patient transferred to ICU and intubated, ? aspiration pneumonia    HPI:   42 y/o F with PMx significant for ESRD, MRSA bacteremia, COPD, Hypertension, ESRD on hemodialysis (MWF) (kidney bx c/w ANCA vasculitis), cocaine and heroin abuse, depression, GERD, epilepsy, Raynaud's syndrome, Rheumatoid arthritis, SLE, seizure disorder (hospitalized 12/15/21 through 21 for seizure, was discharged on Dilantin), presented to ED via EMS for AMS on 21. Patient states he went to the store around 11:30 am yesterday and when he came back, he found her laying face up on the floor next to her bed, was unresponsive and foaming from the mouth.    Patient was admitted from ED yesterday for leukocytosis seen on labs, AMS, low dilantin serum levels, +cocaine, THC  on Utox,  CTH on  was negative  CT chest/Abd pelvis, bilateral perinephric stranding      Patient also with fevers,   Patient overnight became increasinly obtunded with gurgling, ? aspiration and ws inutbated this am  was seen by ID yesterday and started on vanco, zosyn    ROS cant obtain     MEDICATIONS  (STANDING):  amLODIPine   Tablet 10 milliGRAM(s) Oral daily  artificial  tears Solution 1 Drop(s) Both EYES every 2 hours  atovaquone  Suspension 1500 milliGRAM(s) Oral daily  cloNIDine Patch 0.1 mG/24Hr(s) 1 patch Transdermal every 7 days  fosphenytoin IVPB 300 milliGRAM(s) PE IV Intermittent <User Schedule>  hydroxychloroquine 200 milliGRAM(s) Oral daily  methylPREDNISolone sodium succinate Injectable 50 milliGRAM(s) IV Push daily  pantoprazole    Tablet 40 milliGRAM(s) Oral before breakfast  piperacillin/tazobactam IVPB.. 3.375 Gram(s) IV Intermittent every 12 hours  sodium bicarbonate  Infusion 0.126 mEq/kG/Hr (40 mL/Hr) IV Continuous <Continuous>  vancomycin  IVPB 500 milliGRAM(s) IV Intermittent <User Schedule>    MEDICATIONS  (PRN):  acetaminophen     Tablet .. 650 milliGRAM(s) Oral every 6 hours PRN Temp greater or equal to 38C (100.4F), Mild Pain (1 - 3)  ALBUTerol    90 MICROgram(s) HFA Inhaler 2 Puff(s) Inhalation every 6 hours PRN Shortness of Breath and/or Wheezing  aluminum hydroxide/magnesium hydroxide/simethicone Suspension 30 milliLiter(s) Oral every 4 hours PRN Dyspepsia  LORazepam   Injectable 2 milliGRAM(s) IV Push every 2 hours PRN Agitation  melatonin 3 milliGRAM(s) Oral at bedtime PRN Insomnia  metoprolol tartrate Injectable 2.5 milliGRAM(s) IV Push every 6 hours PRN HR.130 sustained  ondansetron Injectable 4 milliGRAM(s) IV Push every 8 hours PRN Nausea and/or Vomiting  simethicone 80 milliGRAM(s) Chew three times a day PRN Gas      Weight (kg): 47.6 ( @ 18:39)    ICU Vital Signs Last 24 Hrs  T(C): 39.1 (2022 03:32), Max: 39.1 (2022 03:32)  T(F): 102.3 (2022 03:32), Max: 102.3 (2022 03:32)  HR: 120 (2022 06:30) (110 - 127)  BP: 120/99 (2022 05:00) (84/61 - 167/119)  BP(mean): 106 (2022 05:00) (69 - 133)  ABP: --  ABP(mean): --  RR: 36 (2022 05:00) (26 - 57)  SpO2: 98% (2022 06:30) (87% - 99%)    Physical Exam    General: ill sweating  HEENT orally intubated,   neck right ij  chest - right chest wall catheter  cv rrr   abdomen - soft, non tender  ext old scaring track marks    I&O's Summary    2022 07:01  -  2022 07:00  --------------------------------------------------------  IN: 0 mL / OUT: 1500 mL / NET: -1500 mL                          11.7   19.19 )-----------( 188      ( 2022 11:15 )             36.0       01    132<L>  |  93<L>  |  32<H>  ----------------------------<  91  4.5   |  14<L>  |  4.96<H>    Ca    9.6      2022 21:27  Mg     3.6         TPro  8.5<H>  /  Alb  2.9<L>  /  TBili  0.5  /  DBili  x   /  AST  24  /  ALT  19  /  AlkPhos  162<H>  -18    CARDIAC MARKERS ( 2022 14:06 )  x     / x     / 39 U/L / x     / x          ABG - ( 2022 18:01 )  pH, Arterial: 7.56  pH, Blood: x     /  pCO2: 18    /  pO2: 70    / HCO3: 16    / Base Excess: -3.5  /  SaO2: 97          Urinalysis Basic - ( 2022 14:06 )    Color: Yellow / Appearance: Slightly Turbid / S.010 / pH: x  Gluc: x / Ketone: Trace  / Bili: Negative / Urobili: Negative   Blood: x / Protein: 500 mg/dL / Nitrite: Negative   Leuk Esterase: Trace / RBC: 25-50 /HPF / WBC 6-10   Sq Epi: x / Non Sq Epi: Occasional / Bacteria: Few    DVT Prophylaxis:   Heparin subq                                                              Advanced Directives: Full Code

## 2022-01-20 NOTE — PROGRESS NOTE ADULT - ASSESSMENT
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  on HD MWF. Recent admission for MRSA bacteremia - permcath was replaced . 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

## 2022-01-20 NOTE — PROGRESS NOTE ADULT - SUBJECTIVE AND OBJECTIVE BOX
Patient seen today in CCU, was intubated earlier this morning, is not awake.    ROS: As stated above, all others are negative.    Vital Signs Last 24 Hrs  T(C): 39.1 (20 Jan 2022 03:32), Max: 39.1 (20 Jan 2022 03:32)  T(F): 102.3 (20 Jan 2022 03:32), Max: 102.3 (20 Jan 2022 03:32)  HR: 123 (20 Jan 2022 09:00) (110 - 127)  BP: 97/72 (20 Jan 2022 09:00) (84/61 - 167/119)  BP(mean): 77 (20 Jan 2022 09:00) (69 - 133)  RR: 34 (20 Jan 2022 09:00) (26 - 57)  SpO2: 91% (20 Jan 2022 09:00) (87% - 100%)    MEDICATIONS  (STANDING):  amLODIPine   Tablet 10 milliGRAM(s) Oral daily  artificial  tears Solution 1 Drop(s) Both EYES every 2 hours  atovaquone  Suspension 1500 milliGRAM(s) Oral daily  chlorhexidine 0.12% Liquid 15 milliLiter(s) Oral Mucosa every 12 hours  cloNIDine Patch 0.1 mG/24Hr(s) 1 patch Transdermal every 7 days  fosphenytoin IVPB 300 milliGRAM(s) PE IV Intermittent <User Schedule>  hydroxychloroquine 200 milliGRAM(s) Oral daily  methylPREDNISolone sodium succinate Injectable 50 milliGRAM(s) IV Push daily  pantoprazole    Tablet 40 milliGRAM(s) Oral before breakfast  piperacillin/tazobactam IVPB.. 3.375 Gram(s) IV Intermittent every 12 hours  vancomycin  IVPB 500 milliGRAM(s) IV Intermittent <User Schedule>    MEDICATIONS  (PRN):  acetaminophen     Tablet .. 650 milliGRAM(s) Oral every 6 hours PRN Temp greater or equal to 38C (100.4F), Mild Pain (1 - 3)  ALBUTerol    90 MICROgram(s) HFA Inhaler 2 Puff(s) Inhalation every 6 hours PRN Shortness of Breath and/or Wheezing  aluminum hydroxide/magnesium hydroxide/simethicone Suspension 30 milliLiter(s) Oral every 4 hours PRN Dyspepsia  LORazepam   Injectable 2 milliGRAM(s) IV Push every 2 hours PRN Agitation  melatonin 3 milliGRAM(s) Oral at bedtime PRN Insomnia  metoprolol tartrate Injectable 2.5 milliGRAM(s) IV Push every 6 hours PRN HR.130 sustained  ondansetron Injectable 4 milliGRAM(s) IV Push every 8 hours PRN Nausea and/or Vomiting  simethicone 80 milliGRAM(s) Chew three times a day PRN Gas      Physical Exam:    General: Normocephalic, NAD  HEENT: PERRLA  Neck: Supple.  Extremities:  no edema  Skin: No rashes    Neurological exam:  HF: Intubated   CN: LAYTON, no NLFD  Motor: Cannot assess strength but moves all 4 ext  Sens: withdraws to painful stimuli   Reflexes: Symmetric 0-1 throughout, downgoing toes b/l  Coord:  Could not assess  Gait/Balance: Cannot test    GENERAL: NAD, well-groomed  HEAD:  Atraumatic, Normocephalic  Neuro: Awake/alert/Somnolent/lethargic/obtunded/stuporous/unresponsive, resists movement, will not follow commands.   CN: resists eye opening  motor: increased tone, not cooperative with testing, resisting passive movements  sensory: withdraws  coordination: unable to test  DTRs: symmetric, plantar responses flexor bilaterally    Neurological exam:  HF: Examination limited due to AMS. Not oriented, appears confused. Unable to assess patient's speech or affect as she will not speak.   CN: LAYTON, no NLFD  Motor: Cannot assess strength as patient was uncooperative, normal bulk and tone, no tremor noted   Sens: Cannot assess  NIHSS: 0          Re-assess NIHSS (before discharging patient):    LABS:                         12.1   17.47 )-----------( 364      ( 20 Jan 2022 07:51 )             39.4     01-20    134<L>  |  92<L>  |  51<H>  ----------------------------<  161<H>  4.7   |  25  |  6.37<H>    Ca    8.6      20 Jan 2022 07:51  Mg     3.6     01-18    TPro  8.5<H>  /  Alb  2.9<L>  /  TBili  0.5  /  DBili  x   /  AST  24  /  ALT  19  /  AlkPhos  162<H>  01-18    LIVER FUNCTIONS - ( 18 Jan 2022 14:06 )  Alb: 2.9 g/dL / Pro: 8.5 gm/dL / ALK PHOS: 162 U/L / ALT: 19 U/L / AST: 24 U/L / GGT: x                 RADIOLOGY:    A/P: (HPI summmary here)    # Write diagnoses, take out diagnoses as you rule them out.  -(Write recommendations under diagnosis) Patient seen today in CCU, was intubated earlier this morning for aspiration, is not awake.    ROS: Could not obtain d/t patient's condition    Vital Signs Last 24 Hrs  T(C): 39.1 (20 Jan 2022 03:32), Max: 39.1 (20 Jan 2022 03:32)  T(F): 102.3 (20 Jan 2022 03:32), Max: 102.3 (20 Jan 2022 03:32)  HR: 123 (20 Jan 2022 09:00) (110 - 127)  BP: 97/72 (20 Jan 2022 09:00) (84/61 - 167/119)  BP(mean): 77 (20 Jan 2022 09:00) (69 - 133)  RR: 34 (20 Jan 2022 09:00) (26 - 57)  SpO2: 91% (20 Jan 2022 09:00) (87% - 100%)    MEDICATIONS  (STANDING):  amLODIPine   Tablet 10 milliGRAM(s) Oral daily  artificial  tears Solution 1 Drop(s) Both EYES every 2 hours  atovaquone  Suspension 1500 milliGRAM(s) Oral daily  chlorhexidine 0.12% Liquid 15 milliLiter(s) Oral Mucosa every 12 hours  cloNIDine Patch 0.1 mG/24Hr(s) 1 patch Transdermal every 7 days  fosphenytoin IVPB 300 milliGRAM(s) PE IV Intermittent <User Schedule>  hydroxychloroquine 200 milliGRAM(s) Oral daily  methylPREDNISolone sodium succinate Injectable 50 milliGRAM(s) IV Push daily  pantoprazole    Tablet 40 milliGRAM(s) Oral before breakfast  piperacillin/tazobactam IVPB.. 3.375 Gram(s) IV Intermittent every 12 hours  vancomycin  IVPB 500 milliGRAM(s) IV Intermittent <User Schedule>    MEDICATIONS  (PRN):  acetaminophen     Tablet .. 650 milliGRAM(s) Oral every 6 hours PRN Temp greater or equal to 38C (100.4F), Mild Pain (1 - 3)  ALBUTerol    90 MICROgram(s) HFA Inhaler 2 Puff(s) Inhalation every 6 hours PRN Shortness of Breath and/or Wheezing  aluminum hydroxide/magnesium hydroxide/simethicone Suspension 30 milliLiter(s) Oral every 4 hours PRN Dyspepsia  LORazepam   Injectable 2 milliGRAM(s) IV Push every 2 hours PRN Agitation  melatonin 3 milliGRAM(s) Oral at bedtime PRN Insomnia  metoprolol tartrate Injectable 2.5 milliGRAM(s) IV Push every 6 hours PRN HR.130 sustained  ondansetron Injectable 4 milliGRAM(s) IV Push every 8 hours PRN Nausea and/or Vomiting  simethicone 80 milliGRAM(s) Chew three times a day PRN Gas      Physical Exam:    General: Normocephalic, NAD  HEENT: PERRLA  Skin: No rashes    Neurological exam:  HF: Intubated, sedated  CN: LAYTON, no NLFD  Motor: Cannot assess strength but moves both LE  Sens: withdraws to painful stimuli   Reflexes: Symmetric 0-1 throughout, mute plantar responses  Coord:  Could not assess  Gait/Balance: Cannot test    LABS:                         12.1   17.47 )-----------( 364      ( 20 Jan 2022 07:51 )             39.4     01-20    134<L>  |  92<L>  |  51<H>  ----------------------------<  161<H>  4.7   |  25  |  6.37<H>    Ca    8.6      20 Jan 2022 07:51  Mg     3.6     01-18    TPro  8.5<H>  /  Alb  2.9<L>  /  TBili  0.5  /  DBili  x   /  AST  24  /  ALT  19  /  AlkPhos  162<H>  01-18    LIVER FUNCTIONS - ( 18 Jan 2022 14:06 )  Alb: 2.9 g/dL / Pro: 8.5 gm/dL / ALK PHOS: 162 U/L / ALT: 19 U/L / AST: 24 U/L / GGT: x           Phenytoin level 1/20/22- 25    RADIOLOGY:   CT Head and CT Cspine No Cont (01.18.22 @ 15:44)     IMPRESSION: No evidence of acute hemorrhage mass effect in posterior   fossa or supratentorial region.    No fracture or dislocation involving the cervical spine.    EEG w/ Video Each 12-26 Hours, Unmonitored (12.17.21 @ 10:55)   Impression: Normal 24 hour video EEG recording. A normal study does not   rule out the underlying presence of epilepsy. If clinically indicated, a   repeat studies recommended.    EEG Awake or Drowsy (12.15.21 @ 16:57)  Impression: Abnormal EEG because of slowing of the background activity,   delta range activity, consistent with a diffuse cerebral dysfunction,   maybe on the basis of a diffuse metabolic, toxic or structural   abnormality.  Generalized and transition epileptiform discharges with are potentially   epileptogenic.  Above findings can be seen in pulse 18 gentle states, abnormality   encephalopathies. Clinical correlation recommended. A prolonged, video   EEG is recommended to further clarify abnormalities.

## 2022-01-20 NOTE — DIETITIAN INITIAL EVALUATION ADULT. - PHYSCIAL ASSESSMENT
underweight Scot scale- 11  Skin: Sacrum unstageable  Last BM documented- 1/19 x 2 (loose, liquid, green)

## 2022-01-20 NOTE — DIETITIAN INITIAL EVALUATION ADULT. - ADD RECOMMEND
1) initiate enteral feeds as recommended. 2) monitor lytes and hydration replete prn 3) Maintain aspiration precautions, back of bed >35 degrees. 4) monitor daily weights track trends 5) continue to monitor triglycerides weekly and consider changing sedation if continues to ^.

## 2022-01-20 NOTE — PROGRESS NOTE ADULT - ATTENDING COMMENTS
At the time of my exam the patient was sedated.  She did not have reactive pupils or oculocephalics. Corneals were intact.    Phenytoin level is 25, but corrects to 36.8 with albumin of 2.9.  Will hold phenytoin for now.  Repeat level in AM.    EEG showing slowing and triphasic waves but no definite epileptiform activity.    LP being considered.    Discussed with Dr. Adamson.

## 2022-01-20 NOTE — DIETITIAN INITIAL EVALUATION ADULT. - PERTINENT LABORATORY DATA
01-20    134<L>  |  92<L>  |  51<H>  ----------------------------<  161<H>  4.7   |  25  |  6.37<H>    Ca    8.6      20 Jan 2022 07:51  Mg     3.6     01-18    TPro  8.5<H>  /  Alb  2.9<L>  /  TBili  0.5  /  DBili  x   /  AST  24  /  ALT  19  /  AlkPhos  162<H>  01-18  BMI: BMI (kg/m2): 17.5 (01-19-22 @ 18:39)  HbA1c:   Glucose: POCT Blood Glucose.: 77 mg/dL (01-18-22 @ 13:56)    BP: 99/73 (01-20-22 @ 08:00) (84/61 - 176/89)  Lipid Panel: Date/Time: 10-12-21 @ 09:54  Cholesterol, Serum: 137  Direct LDL: --  HDL Cholesterol, Serum: 14  Total Cholesterol/HDL Ration Measurement: --  Triglycerides, Serum: 470

## 2022-01-20 NOTE — PROGRESS NOTE ADULT - SUBJECTIVE AND OBJECTIVE BOX
42 y/o F PMx significant for MRSA bacteremia, COPD, Hypertension, ESRD on hemodialysis (MWF) (kidney bx c/w ANCA vasculitis), cocaine and heroin abuse, depression, GERD, epilepsy, Raynaud's syndrome, Rheumatoid arthritis, SLE, seizure disorder, hospitalized 12/15/21 through 12/18/21 for seizure, presented in the ER on 01/18 via EMS  for AMS.     -Pt w leukocytosis, sinus tachycardia, high sensitivity trop of 286, elevated BNP, + cocaine and THC on Utox. Cardiology consulted for further evaluation of elevated trop/BNP.     01/20: Overnight events noted, intubation...    Vitals:  ============  T(F): 99.4 (19 Jan 2022 11:15), Max: 99.4 (19 Jan 2022 11:15)  HR: 120 (19 Jan 2022 11:45)  BP: 151/117 (19 Jan 2022 11:45)  RR: 26 (19 Jan 2022 11:15)  SpO2: 97% (19 Jan 2022 11:45) (93% - 100%)  temp max in last 48H T(F): , Max: 99.4 (01-19-22 @ 11:15)    Physical Exam   Gen: comfortable  CV: sinus tachycardia, s1/s2, no M/R/G  Pulm: crackles auscultated throughout lung fields, nl respiratory effort on 1L NC  Abd: normoactive bowel sounds in all 4 quadrants, soft, nontender, nondistended, no rebound, no guarding, no masses  Extremities: no pedal edema, pedal pulses palpable   Skin: nl warm and dry, no wounds   Neuro: Nonverbally responsive at the moment, nods head in understanding      =======================================================  Current Antibiotics:  atovaquone  Suspension 1500 milliGRAM(s) Oral daily  hydroxychloroquine 200 milliGRAM(s) Oral daily  piperacillin/tazobactam IVPB.. 3.375 Gram(s) IV Intermittent every 12 hours    Other medications:  amLODIPine   Tablet 10 milliGRAM(s) Oral daily  artificial  tears Solution 1 Drop(s) Both EYES every 2 hours  cloNIDine 0.1 milliGRAM(s) Oral daily  fosphenytoin IVPB 1000 milliGRAM(s) PE IV Intermittent once  heparin   Injectable 5000 Unit(s) SubCutaneous every 12 hours  hydrALAZINE 25 milliGRAM(s) Oral three times a day  pantoprazole    Tablet 40 milliGRAM(s) Oral before breakfast  phenytoin   Capsule 300 milliGRAM(s) Oral at bedtime  predniSONE   Tablet 50 milliGRAM(s) Oral daily      =======================================================  Labs:                        11.7   19.19 )-----------( 188      ( 19 Jan 2022 11:15 )             36.0     01-18    130<L>  |  92<L>  |  72<H>  ----------------------------<  84  5.1   |  23  |  9.36<H>    Ca    9.4      18 Jan 2022 14:06  Mg     3.6     01-18    TPro  8.5<H>  /  Alb  2.9<L>  /  TBili  0.5  /  DBili  x   /  AST  24  /  ALT  19  /  AlkPhos  162<H>  01-18      Creatinine, Serum: 9.36 mg/dL (01-18-22 @ 14:06)            WBC Count: 19.19 K/uL (01-19-22 @ 11:15)  WBC Count: 29.70 K/uL (01-18-22 @ 14:06)    SARS-CoV-2: NotDetec (01-18-22 @ 14:06)  Rapid RVP Result: NotDetec (01-18-22 @ 14:06)    SARS-CoV-2: NotDetec (01-18-22 @ 14:06)      Alkaline Phosphatase, Serum: 162 U/L (01-18-22 @ 14:06)  Alanine Aminotransferase (ALT/SGPT): 19 U/L (01-18-22 @ 14:06)  Aspartate Aminotransferase (AST/SGOT): 24 U/L (01-18-22 @ 14:06)  Bilirubin Total, Serum: 0.5 mg/dL (01-18-22 @ 14:06)    < from: Xray Chest 1 View- PORTABLE-Urgent (Xray Chest 1 View- PORTABLE-Urgent .) (01.18.22 @ 15:13) >  IMPRESSION: Large right jugular line. Otherwise unremarkable study.    < end of copied text >      < from: CT Head No Cont (01.18.22 @ 15:44) >  IMPRESSION: No evidence of acute hemorrhage mass mass effect in posterior   fossa or supratentorial region.    No fracture or dislocation involving the cervical spine.    < end of copied text >    < from: CT Chest No Cont (01.18.22 @ 15:52) >  IMPRESSION: New subtle compression deformities of the superior T5 and T6   endplates. No evidence of visceral organ injury.      < end of copied text >               42 y/o F PMx significant for MRSA bacteremia, COPD, Hypertension, ESRD on hemodialysis (MWF) (kidney bx c/w ANCA vasculitis), cocaine and heroin abuse, depression, GERD, epilepsy, Raynaud's syndrome, Rheumatoid arthritis, SLE, seizure disorder, hospitalized 12/15/21 through 12/18/21 for seizure, presented in the ER on 01/18 via EMS  for AMS.     -Pt w leukocytosis, sinus tachycardia, high sensitivity trop of 286, elevated BNP, + cocaine and THC on Utox. Cardiology consulted for further evaluation of elevated trop/BNP.     01/20: Overnight events noted, patient became hypoxic despite ventimask, gurgling secretions w elevated RR, obtunded. Patient was transferred to CCU and intubated. Became hypotensive before intubation requiring pressors, currently off pressors. Patient remains in CCU, intubated and sedated, tachy in the 120's at bedside, BP 97/72.     Vitals  T(F): 102.3 (01-20-22 @ 03:32), Max: 102.3 (01-20-22 @ 03:32)  HR: 123 (01-20-22 @ 09:00) (110 - 127)  BP: 97/72 (01-20-22 @ 09:00) (84/61 - 167/119)  RR: 34 (01-20-22 @ 09:00) (26 - 57)  SpO2: 91% (01-20-22 @ 09:00) (87% - 100%)I    Physical Exam   Gen: sedated  CV: sinus tachycardia, s1/s2, no M/R/G  Pulm: crackles resolving, currently intubated   Abd: normoactive bowel sounds in all 4 quadrants, soft, nontender, nondistended, no rebound, no guarding, no masses  Extremities: warm, no pedal edema, pedal pulses palpable, LLE contraction, uses wheelchair to ambulate previously  Skin: nl warm and dry, no wounds   Neuro: sedated     =======================================================  MEDICATIONS  (STANDING):  amLODIPine   Tablet 10 milliGRAM(s) Oral daily  artificial  tears Solution 1 Drop(s) Both EYES every 2 hours  atovaquone  Suspension 1500 milliGRAM(s) Oral daily  chlorhexidine 0.12% Liquid 15 milliLiter(s) Oral Mucosa every 12 hours  cloNIDine Patch 0.1 mG/24Hr(s) 1 patch Transdermal every 7 days  fosphenytoin IVPB 300 milliGRAM(s) PE IV Intermittent <User Schedule>  hydroxychloroquine 200 milliGRAM(s) Oral daily  methylPREDNISolone sodium succinate Injectable 50 milliGRAM(s) IV Push daily  pantoprazole    Tablet 40 milliGRAM(s) Oral before breakfast  piperacillin/tazobactam IVPB.. 3.375 Gram(s) IV Intermittent every 12 hours  vancomycin  IVPB 500 milliGRAM(s) IV Intermittent <User Schedule>    MEDICATIONS  (PRN):  acetaminophen     Tablet .. 650 milliGRAM(s) Oral every 6 hours PRN Temp greater or equal to 38C (100.4F), Mild Pain (1 - 3)  ALBUTerol    90 MICROgram(s) HFA Inhaler 2 Puff(s) Inhalation every 6 hours PRN Shortness of Breath and/or Wheezing  aluminum hydroxide/magnesium hydroxide/simethicone Suspension 30 milliLiter(s) Oral every 4 hours PRN Dyspepsia  LORazepam   Injectable 2 milliGRAM(s) IV Push every 2 hours PRN Agitation  melatonin 3 milliGRAM(s) Oral at bedtime PRN Insomnia  metoprolol tartrate Injectable 2.5 milliGRAM(s) IV Push every 6 hours PRN HR.130 sustained  ondansetron Injectable 4 milliGRAM(s) IV Push every 8 hours PRN Nausea and/or Vomiting  simethicone 80 milliGRAM(s) Chew three times a day PRN Gas      =======================================================      LABS:  01-18 @ 14:06 Creatine 39 U/L [26 - 192]  cret                        12.1   17.47 )-----------( 364      ( 20 Jan 2022 07:51 )             39.4     01-20    134<L>  |  92<L>  |  51<H>  ----------------------------<  161<H>  4.7   |  25  |  6.37<H>    Ca    8.6      20 Jan 2022 07:51  Mg     3.6     01-18    TPro  8.5<H>  /  Alb  2.9<L>  /  TBili  0.5  /  DBili  x   /  AST  24  /  ALT  19  /  AlkPhos  162<H>  01-18    PT/INR - ( 18 Jan 2022 14:06 )   PT: 14.9 sec;   INR: 1.30 ratio         PTT - ( 18 Jan 2022 14:06 )  PTT:39.9 sec          < from: Xray Chest 1 View- PORTABLE-Urgent (Xray Chest 1 View- PORTABLE-Urgent .) (01.18.22 @ 15:13) >  IMPRESSION: Large right jugular line. Otherwise unremarkable study.    < end of copied text >      < from: CT Head No Cont (01.18.22 @ 15:44) >  IMPRESSION: No evidence of acute hemorrhage mass mass effect in posterior   fossa or supratentorial region.    No fracture or dislocation involving the cervical spine.    < end of copied text >    < from: CT Chest No Cont (01.18.22 @ 15:52) >  IMPRESSION: New subtle compression deformities of the superior T5 and T6   endplates. No evidence of visceral organ injury.      < end of copied text >    < from: TTE Echo Complete w/o Contrast w/ Doppler (01.19.22 @ 08:38) >   Summary     The left ventricle is normal in size.   Moderately reduced left ventricular systolic function.   Estimated left ventricular ejection fraction is 40-45 %.   Overall LV dysfunction appears global.   Normal appearing right ventricle structure and function.   Normal appearing mitral valve structure and function.   Mild (1+) mitral regurgitation is present.   Normal appearing tricuspid valve structure and function.   Trace tricuspid valve regurgitation is present.   No evidence of pericardial effusion.     Signature     ----------------------------------------------------------------   Electronically signed by Jamie Pastrana DO(Interpreting   physician) on 01/19/2022 10:04 AM    < end of copied text >               REASON FOR VISIT: Elevated Troponin    40 y/o F PMx significant for MRSA bacteremia, COPD, Hypertension, ESRD on hemodialysis (MWF) (kidney bx c/w ANCA vasculitis), cocaine and heroin abuse, depression, GERD, epilepsy, Raynaud's syndrome, Rheumatoid arthritis, SLE, seizure disorder, hospitalized 12/15/21 through 12/18/21 for seizure, presented in the ER on 01/18 via EMS  for AMS.     -Pt w leukocytosis, sinus tachycardia, high sensitivity trop of 286, elevated BNP, + cocaine and THC on Utox. Cardiology consulted for further evaluation of elevated trop/BNP.     01/20: Overnight events noted, patient became hypoxic despite ventimask, gurgling secretions w elevated RR, obtunded. Patient was transferred to CCU and intubated. Became hypotensive before intubation requiring pressors, currently off pressors. Patient remains in CCU, intubated and sedated, tachy in the 120's at bedside, BP 97/72.     Vitals  T(F): 102.3 (01-20-22 @ 03:32), Max: 102.3 (01-20-22 @ 03:32)  HR: 123 (01-20-22 @ 09:00) (110 - 127)  BP: 97/72 (01-20-22 @ 09:00) (84/61 - 167/119)  RR: 34 (01-20-22 @ 09:00) (26 - 57)  SpO2: 91% (01-20-22 @ 09:00) (87% - 100%)I    Physical Exam   Gen: sedated  CV: sinus tachycardia, s1/s2, no M/R/G  Pulm: crackles resolving, currently intubated   Abd: normoactive bowel sounds in all 4 quadrants, soft, nontender, nondistended, no rebound, no guarding, no masses  Extremities: warm, no pedal edema, pedal pulses palpable, LLE contraction, uses wheelchair to ambulate previously  Skin: nl warm and dry, no wounds   Neuro: sedated     =======================================================  MEDICATIONS  (STANDING):  amLODIPine   Tablet 10 milliGRAM(s) Oral daily  artificial  tears Solution 1 Drop(s) Both EYES every 2 hours  atovaquone  Suspension 1500 milliGRAM(s) Oral daily  chlorhexidine 0.12% Liquid 15 milliLiter(s) Oral Mucosa every 12 hours  cloNIDine Patch 0.1 mG/24Hr(s) 1 patch Transdermal every 7 days  fosphenytoin IVPB 300 milliGRAM(s) PE IV Intermittent <User Schedule>  hydroxychloroquine 200 milliGRAM(s) Oral daily  methylPREDNISolone sodium succinate Injectable 50 milliGRAM(s) IV Push daily  pantoprazole    Tablet 40 milliGRAM(s) Oral before breakfast  piperacillin/tazobactam IVPB.. 3.375 Gram(s) IV Intermittent every 12 hours  vancomycin  IVPB 500 milliGRAM(s) IV Intermittent <User Schedule>    MEDICATIONS  (PRN):  acetaminophen     Tablet .. 650 milliGRAM(s) Oral every 6 hours PRN Temp greater or equal to 38C (100.4F), Mild Pain (1 - 3)  ALBUTerol    90 MICROgram(s) HFA Inhaler 2 Puff(s) Inhalation every 6 hours PRN Shortness of Breath and/or Wheezing  aluminum hydroxide/magnesium hydroxide/simethicone Suspension 30 milliLiter(s) Oral every 4 hours PRN Dyspepsia  LORazepam   Injectable 2 milliGRAM(s) IV Push every 2 hours PRN Agitation  melatonin 3 milliGRAM(s) Oral at bedtime PRN Insomnia  metoprolol tartrate Injectable 2.5 milliGRAM(s) IV Push every 6 hours PRN HR.130 sustained  ondansetron Injectable 4 milliGRAM(s) IV Push every 8 hours PRN Nausea and/or Vomiting  simethicone 80 milliGRAM(s) Chew three times a day PRN Gas      =======================================================      LABS:  01-18 @ 14:06 Creatine 39 U/L [26 - 192]  cret                        12.1   17.47 )-----------( 364      ( 20 Jan 2022 07:51 )             39.4     01-20    134<L>  |  92<L>  |  51<H>  ----------------------------<  161<H>  4.7   |  25  |  6.37<H>    Ca    8.6      20 Jan 2022 07:51  Mg     3.6     01-18    TPro  8.5<H>  /  Alb  2.9<L>  /  TBili  0.5  /  DBili  x   /  AST  24  /  ALT  19  /  AlkPhos  162<H>  01-18    PT/INR - ( 18 Jan 2022 14:06 )   PT: 14.9 sec;   INR: 1.30 ratio         PTT - ( 18 Jan 2022 14:06 )  PTT:39.9 sec          < from: Xray Chest 1 View- PORTABLE-Urgent (Xray Chest 1 View- PORTABLE-Urgent .) (01.18.22 @ 15:13) >  IMPRESSION: Large right jugular line. Otherwise unremarkable study.    < end of copied text >      < from: CT Head No Cont (01.18.22 @ 15:44) >  IMPRESSION: No evidence of acute hemorrhage mass mass effect in posterior   fossa or supratentorial region.    No fracture or dislocation involving the cervical spine.    < end of copied text >    < from: CT Chest No Cont (01.18.22 @ 15:52) >  IMPRESSION: New subtle compression deformities of the superior T5 and T6   endplates. No evidence of visceral organ injury.      < end of copied text >    < from: TTE Echo Complete w/o Contrast w/ Doppler (01.19.22 @ 08:38) >   Summary     The left ventricle is normal in size.   Moderately reduced left ventricular systolic function.   Estimated left ventricular ejection fraction is 40-45 %.   Overall LV dysfunction appears global.   Normal appearing right ventricle structure and function.   Normal appearing mitral valve structure and function.   Mild (1+) mitral regurgitation is present.   Normal appearing tricuspid valve structure and function.   Trace tricuspid valve regurgitation is present.   No evidence of pericardial effusion.     Signature     ----------------------------------------------------------------   Electronically signed by Jamie Pastrana DO(Interpreting   physician) on 01/19/2022 10:04 AM    < end of copied text >

## 2022-01-20 NOTE — DIETITIAN INITIAL EVALUATION ADULT. - PERTINENT MEDS FT
MEDICATIONS  (STANDING):  amLODIPine   Tablet 10 milliGRAM(s) Oral daily  artificial  tears Solution 1 Drop(s) Both EYES every 2 hours  atovaquone  Suspension 1500 milliGRAM(s) Oral daily  cloNIDine Patch 0.1 mG/24Hr(s) 1 patch Transdermal every 7 days  fosphenytoin IVPB 300 milliGRAM(s) PE IV Intermittent <User Schedule>  hydroxychloroquine 200 milliGRAM(s) Oral daily  methylPREDNISolone sodium succinate Injectable 50 milliGRAM(s) IV Push daily  pantoprazole    Tablet 40 milliGRAM(s) Oral before breakfast  piperacillin/tazobactam IVPB.. 3.375 Gram(s) IV Intermittent every 12 hours  vancomycin  IVPB 500 milliGRAM(s) IV Intermittent <User Schedule>    MEDICATIONS  (PRN):  acetaminophen     Tablet .. 650 milliGRAM(s) Oral every 6 hours PRN Temp greater or equal to 38C (100.4F), Mild Pain (1 - 3)  ALBUTerol    90 MICROgram(s) HFA Inhaler 2 Puff(s) Inhalation every 6 hours PRN Shortness of Breath and/or Wheezing  aluminum hydroxide/magnesium hydroxide/simethicone Suspension 30 milliLiter(s) Oral every 4 hours PRN Dyspepsia  LORazepam   Injectable 2 milliGRAM(s) IV Push every 2 hours PRN Agitation  melatonin 3 milliGRAM(s) Oral at bedtime PRN Insomnia  metoprolol tartrate Injectable 2.5 milliGRAM(s) IV Push every 6 hours PRN HR.130 sustained  ondansetron Injectable 4 milliGRAM(s) IV Push every 8 hours PRN Nausea and/or Vomiting  simethicone 80 milliGRAM(s) Chew three times a day PRN Gas

## 2022-01-20 NOTE — PROGRESS NOTE ADULT - ASSESSMENT
42 y/o F PMx significant for MRSA bacteremia, COPD, Hypertension, ESRD on hemodialysis (MWF) (kidney bx c/w ANCA vasculitis), cocaine and heroin abuse, depression, GERD, epilepsy, Raynaud's syndrome, Rheumatoid arthritis, SLE, seizure disorder, hospitalized 12/15/21 through 12/18/21 for seizure, presented in the ER on 01/18 via EMS  for AMS.     -Pt w leukocytosis, sinus tachycardia, high sensitivity trop of 286, elevated BNP, + cocaine and THC on Utox. Cardiology consulted for further evaluation of elevated trop/BNP.     #Elevated troponin  -High sensitivity trop 286  -Negligible. Most likely 2/2 Type II MI, demand injury due to cocaine abuse. EKG notable for sinus tachy, no ST segment changes appreciated    #Elevated BNP  -Unclear clinical significance given ESRD on HD  -Not clinically fluid overloaded on physical exam    #Sinus Tachycardia  -Pt on cardiac monitor, pulse in the 120's  -EKG notable for sinus tachy, no ST segment changes or heart blocks appreciated  -Most likely 2/2 sepsis/cocaine abuse    *Above discussed w Dr. Allison     40 y/o F PMx significant for MRSA bacteremia, COPD, Hypertension, ESRD on hemodialysis (MWF) (kidney bx c/w ANCA vasculitis), cocaine and heroin abuse, depression, GERD, epilepsy, Raynaud's syndrome, Rheumatoid arthritis, SLE, seizure disorder, hospitalized 12/15/21 through 12/18/21 for seizure, presented in the ER on 01/18 via EMS  for AMS.     -Pt w leukocytosis, sinus tachycardia, high sensitivity trop of 286, elevated BNP, + cocaine and THC on Utox. Cardiology consulted for further evaluation of elevated trop/BNP. Patient now in CCU intubation, most likely 2/2 aspiration PNA and inability to protect airway.     #LV Dysfunction  -Echo demonstrating  Moderately reduced left ventricular systolic function. Estimated left ventricular ejection fraction is 40-45 %. Overall LV dysfunction appears global.  -Changes worsened when compared to Echo from November, not present in Echo from October.   -Systolic dysfunction may be 2/2 acute sepsis picture, may improve if patient improves.   -Once patient is more stable, if LV systolic function does not improve, patient may benefit from ischemic work-up    #Sinus Tachycardia  -Pt on cardiac monitor, pulse in the 120's  -EKG notable for sinus tachy, no ST segment changes or heart blocks appreciated  -Most likely 2/2 sepsis/hypotension  -Continue to monitor, Metoprolol PRN    #Hypotension  -Most likely 2/2 acute infection/sedation from intubation  -Extremities warm, currently off pressors  -Continue to monitor fluid status closely  -D/C Amlodipine and home antihypertensives    #Elevated troponin  -High sensitivity trop 286  -Negligible. Most likely 2/2 Type II MI, demand injury due to cocaine abuse. EKG notable for sinus tachy, no ST segment changes appreciated    #Elevated BNP  -Unclear clinical significance given ESRD on HD  -Not clinically fluid overloaded on physical exam    *Above discussed w Dr. Venegas

## 2022-01-20 NOTE — PROGRESS NOTE ADULT - ATTENDING COMMENTS
Patient seen and examined.  Ms Mane has new LV dysfunction in the setting of critical illness, substance abuse (cocaine and THC +), ESRD.  She will need an ischemia evaluation when she recovers from acute illness.

## 2022-01-20 NOTE — DIETITIAN INITIAL EVALUATION ADULT. - MALNUTRITION
severe protein/calorie malnutrition in context of acute on chronic illness r/t inability to meet ENN 2/2 sepsis AEB severe/moderate muscle/fat wasting, <50% of ENN x > 5 days, and significant weight loss x 6 weeks (11%)

## 2022-01-20 NOTE — PROCEDURE NOTE - NSINDICATIONS_GEN_A_CORE
airway protection/critical patient/mental status change/respiratory distress/respiratory failure
critical illness
critical patient/mental status change

## 2022-01-20 NOTE — DIETITIAN INITIAL EVALUATION ADULT. - OTHER INFO
40 y/o F with PMx significant for ESRD, MRSA bacteremia, COPD, Hypertension, ESRD on hemodialysis (MWF) (kidney bx c/w ANCA vasculitis), cocaine and heroin abuse, depression, GERD, epilepsy, Raynaud's syndrome, Rheumatoid arthritis, SLE, seizure disorder (hospitalized 12/15/21 through 12/18/21 for seizure, was discharged on Dilantin), presented to ED via EMS for AMS on 1/18/21. Patient states he went to the store around 11:30 am yesterday and when he came back, he found her laying face up on the floor next to her bed, was unresponsive and foaming from the mouth.  Patient overnight became increasingly obtunded with gurgling, ? aspiration and ws intubated this am (1/20). TF to be initiated for nutrition support. Propofol infusing @ 15.8ml/hr providing additional 415 calories. Triglycerides >400 (as per critical care MD will continue with propofol for now). Last hospital weight documented 12/1/# indicating a significant weight loss x 6 weeks 11%. Pt appears thin, frail, bony, and malnourished. Suggest Nepro formula (renal formula). Will continue to monitor and follow up prn. See below for recommendations. Criteria met for severe protein/calorie malnutrition.

## 2022-01-20 NOTE — PROCEDURE NOTE - NSPROCDETAILS_GEN_ALL_CORE
guidewire recovered/lumen(s) aspirated and flushed/sterile dressing applied/sterile technique, catheter placed/ultrasound guidance with use of sterile gel and probe cove
patient pre-oxygenated, tube inserted, placement confirmed
location identified, draped/prepped, sterile technique used, needle inserted/introduced

## 2022-01-20 NOTE — DIETITIAN NUTRITION RISK NOTIFICATION - ADDITIONAL COMMENTS/DIETITIAN RECOMMENDATIONS
· Additional Recommendations  1) initiate enteral feeds as recommended. 2) monitor lytes and hydration replete prn 3) Maintain aspiration precautions, back of bed >35 degrees. 4) monitor daily weights track trends 5) continue to monitor triglycerides weekly and consider changing sedation if continues to ^.

## 2022-01-20 NOTE — PROGRESS NOTE ADULT - ASSESSMENT
A/P: 40 y/o F with PMx significant for MRSA bacteremia, COPD, Hypertension, ESRD on hemodialysis (MWF) (kidney bx c/w ANCA vasculitis), cocaine and heroin abuse, depression, GERD, epilepsy, Raynaud's syndrome, Rheumatoid arthritis, SLE, seizure disorder (hospitalized 12/15/21 through 12/18/21 for seizure, was discharged on Dilantin), presented to ED via EMS for AMS on 1/18/21, patient's brother found her laying on the floor and was unresponsive, foaming from the mouth; when EMS picked her up yesterday there were reportedly needles underneath her. Patient's brother feels that her seizures started 8 years ago from drug use, but has not seen her take drugs and has never witnessed one of her seizures firsthand.     #?Seizure disorder-overall it is unclear whether patient has a real seizure disorder, or whether she has been experiencing seizures secondary to drug use. However, EEG during a previous admission did show underlying epileptiform activity.     #?Infectious encephalopathy- patient now with fevers, spoke to CCU Dr. Adamson         -Would give fosphenytoin 1 gram IV after dialysis and then continue 300 mg qhs on following days   -24 hr EEG pending  -Correct all underlying metabolic abnormalities  -Can consider LP in this patient to evaluate CSF for infectious source  -Recommend psych eval when patient is more alert d/t depression and suicidal ideations  -Further recs as per medicine   A/P: 40 y/o F with PMx significant for MRSA bacteremia, COPD, Hypertension, ESRD on hemodialysis (MWF) (kidney bx c/w ANCA vasculitis), cocaine and heroin abuse, depression, GERD, epilepsy, Raynaud's syndrome, Rheumatoid arthritis, SLE, seizure disorder (hospitalized 12/15/21 through 12/18/21 for seizure, was discharged on Dilantin), presented to ED via EMS for AMS on 1/18/21, patient's brother found her laying on the floor and was unresponsive, foaming from the mouth; when EMS picked her up yesterday there were reportedly needles underneath her. Patient's brother feels that her seizures started 8 years ago from drug use, but has not seen her take drugs and has never witnessed one of her seizures firsthand.     #?Seizure disorder-overall it is unclear whether patient has a real seizure disorder, or whether she has been experiencing seizures secondary to drug use. However, EEG during a previous admission did show underlying epileptiform activity.     #?Infectious encephalopathy- patient now with fevers, spoke to CCU Dr. Adamson         -Continue fosphenytoin 300 mg qhs  -Correct all underlying metabolic abnormalities  -Can consider LP in this patient to evaluate CSF for infectious source  -Recommend psych eval when patient is more alert d/t depression and suicidal ideations  -Further recs as per medicine

## 2022-01-20 NOTE — PROGRESS NOTE ADULT - ASSESSMENT
Patient with history ESRD on dialysis  with altered mental status,   fevers  likely aspiration pneumonia  Acute respiratory failure  seizures  r/o sepsis    1. now intubated, will check sputum culture, minimal infiltrate on cxr  2. seizures on dilantin, check eeg  3. check cultures - blood negative, check sputum, some pyelo on ct  4. on vanco, zosyn, 2 mepron,  2, ID note appreciated  5. plaquenil for vasulitis                                                Patient with history ESRD on dialysis  with altered mental status,   fevers  likely aspiration pneumonia  Acute respiratory failure  seizures  r/o sepsis    1. now intubated, will check sputum culture, minimal infiltrate on cxr  2. seizures on dilantin, check eeg  3. check cultures - blood negative, check sputum, some pyelo on ct  4. on vanco, zosyn, 2 mepron,  2, ID note appreciated  5. plaquenil for vasulitis  6. Patient with respiratory alkalosis, d/c bicarbonate drip  7. start tube feeds

## 2022-01-20 NOTE — PROGRESS NOTE ADULT - SUBJECTIVE AND OBJECTIVE BOX
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      HD yest      Now intubated and sedated on vent  EEG in progress    PAST MEDICAL & SURGICAL HISTORY:  Rheumatoid arthritis    H/O Raynaud&#x27;s syndrome    Heroin abuse    Smoker    Sepsis    HTN (hypertension)    COPD (chronic obstructive pulmonary disease)    Depression    Epilepsy    GERD (gastroesophageal reflux disease)    Bacteremia    Systemic lupus erythematosus    Aphonia    H/O tubal ligation    H/O appendicitis    Gall bladder stones    H/O tracheostomy    S/P percutaneous endoscopic gastrostomy (PEG) tube placement    MEDICATIONS  (STANDING):  amLODIPine   Tablet 10 milliGRAM(s) Oral daily  artificial  tears Solution 1 Drop(s) Both EYES two times a day  atovaquone  Suspension 1500 milliGRAM(s) Oral daily  chlorhexidine 0.12% Liquid 15 milliLiter(s) Oral Mucosa every 12 hours  cloNIDine Patch 0.1 mG/24Hr(s) 1 patch Transdermal every 7 days  hydroxychloroquine 200 milliGRAM(s) Oral daily  methylPREDNISolone sodium succinate Injectable 50 milliGRAM(s) IV Push daily  pantoprazole    Tablet 40 milliGRAM(s) Oral before breakfast  piperacillin/tazobactam IVPB.. 3.375 Gram(s) IV Intermittent every 12 hours  vancomycin  IVPB 500 milliGRAM(s) IV Intermittent <User Schedule>    MEDICATIONS  (PRN):  acetaminophen     Tablet .. 650 milliGRAM(s) Oral every 6 hours PRN Temp greater or equal to 38C (100.4F), Mild Pain (1 - 3)  ALBUTerol    90 MICROgram(s) HFA Inhaler 2 Puff(s) Inhalation every 6 hours PRN Shortness of Breath and/or Wheezing  aluminum hydroxide/magnesium hydroxide/simethicone Suspension 30 milliLiter(s) Oral every 4 hours PRN Dyspepsia  LORazepam   Injectable 2 milliGRAM(s) IV Push every 2 hours PRN Agitation  melatonin 3 milliGRAM(s) Oral at bedtime PRN Insomnia  metoprolol tartrate Injectable 2.5 milliGRAM(s) IV Push every 6 hours PRN HR.130 sustained  ondansetron Injectable 4 milliGRAM(s) IV Push every 8 hours PRN Nausea and/or Vomiting  simethicone 80 milliGRAM(s) Chew three times a day PRN Gas        Allergies    morphine (Rash)    Intolerances        SOCIAL HISTORY:  + drug use      REVIEW OF SYSTEMS:  UTO      Vital Signs Last 24 Hrs  T(C): 37.6 (2022 08:30), Max: 39.1 (2022 03:32)  T(F): 99.7 (2022 08:30), Max: 102.3 (2022 03:32)  HR: 127 (2022 15:00) (110 - 127)  BP: 121/82 (2022 15:00) (41/15 - 151/99)  BP(mean): 90 (2022 15:00) (22 - 113)  RR: 24 (2022 15:00) (24 - 57)  SpO2: 95% (2022 15:00) (87% - 100%)      PHYSICAL EXAM:    Constitutional: frail, >>stated age  HEENT: frail, temp wasting  Neck: No LAD, No JVD  Respiratory: scatt ronchi   S1 and S2, RRR  Gastrointestinal: BS+   Neurological: sedated on vent   marcy cath R        LABS:                                   12.1   17.47 )-----------( 364      ( 2022 07:51 )             39.4                         11.7   19.19 )-----------( 188      ( 2022 11:15 )             36.0         134    |  92     |  51     ----------------------------<  161       2022 07:51  4.7     |  25     |  6.37     132    |  93     |  32     ----------------------------<  91        2022 21:27  4.5     |  14     |  4.96     134    |  97     |  76     ----------------------------<  67        2022 11:15  4.5     |  16     |  9.10     Ca    8.6        2022 07:51  Ca    9.6        2022 21:27      Mg     3.6       2022 14:06    TPro  8.5    /  Alb  2.9    /  TBili  0.5    /        2022 14:06  DBili  x      /  AST  24     /  ALT  19     /  AlkPhos  162                        Urine Studies:  Urinalysis Basic - ( 2022 14:06 )    Color: Yellow / Appearance: Slightly Turbid / S.010 / pH: x  Gluc: x / Ketone: Trace  / Bili: Negative / Urobili: Negative   Blood: x / Protein: 500 mg/dL / Nitrite: Negative   Leuk Esterase: Trace / RBC: 25-50 /HPF / WBC 6-10   Sq Epi: x / Non Sq Epi: Occasional / Bacteria: Few            RADIOLOGY & ADDITIONAL STUDIES:

## 2022-01-20 NOTE — PROGRESS NOTE ADULT - ASSESSMENT
42 y/o F PMx significant for MRSA bacteremia, COPD, Hypertension, ESRD on hemodialysis (MWF) (kidney bx c/w ANCA vasculitis), cocaine and heroin abuse, depression, GERD, epilepsy, Raynaud's syndrome, Rheumatoid arthritis, SLE, seizure disorder, hospitalized 12/15/21 through 12/18/21 for seizure, presented in the ER via EMS  for AMS. The patient family found her on the floor unresponsive after they left her alone for an hour. The patient respond only to painful stimulation. The patient was evaluated by ICU practitioner and recommended CICU admission. Here noted with elevated wbc ct 29, LA 5.7, imaging remarkable for new subtle compression deformities of superior t5/t6 endplates, RUL bronchiolitis, LLL atelectasis, UA with pyuria, b/l perinephric stranding noted on CT, was given IV vanco/zosyn, is on steroids, on mepron for pcp prophlyaxis.     1. sepsis. encephalopathy. pyelonephritis? ESRD. ANCA vasculitis. bronchiolitis. COPD. substance abuse  - imaging reviewed, neurology f/u noted, plan for EEG  - on mepron for pcp prophylaxis  -  IV zosyn [id number] #2  - on vancomycin post HD #2  - consider LP r/o CNS source of infection in light of fevers/encephalopathy  - blood cx, urine cx no growth so far doubt gu source   - monitor temps  - tolerating abx well so far; no side effects noted  - reason for abx use and side effects reviewed with patient  - supportive care  - fu cbc    2. other issues - care per medicine

## 2022-01-20 NOTE — DIETITIAN INITIAL EVALUATION ADULT. - ENTERAL
1) initiate enteric feeds Nepro @ 20cc/hr increase 15cc Q 8 hr until goal rate of 30cc (total volume 600ml) + 4 packs of Prosource TF daily (total provided including propofol calories= 1655 calories/ 93gm protein/ 440 ml free water in formula) 2) additional water flush as per MD 1) initiate enteric feeds Nepro @ 20cc/hr increase 10cc Q 8 hr until goal rate of 30cc (total volume 600ml) + 4 packs of Prosource TF daily (total provided including propofol calories= 1655 calories/ 93gm protein/ 440 ml free water in formula) 2) additional water flush as per MD

## 2022-01-21 LAB
AMMONIA BLD-MCNC: 30 UMOL/L — SIGNIFICANT CHANGE UP (ref 11–32)
ANION GAP SERPL CALC-SCNC: 15 MMOL/L — SIGNIFICANT CHANGE UP (ref 5–17)
BUN SERPL-MCNC: 67 MG/DL — HIGH (ref 7–23)
CALCIUM SERPL-MCNC: 8.4 MG/DL — LOW (ref 8.5–10.1)
CHLORIDE SERPL-SCNC: 94 MMOL/L — LOW (ref 96–108)
CO2 SERPL-SCNC: 30 MMOL/L — SIGNIFICANT CHANGE UP (ref 22–31)
CREAT SERPL-MCNC: 7.97 MG/DL — HIGH (ref 0.5–1.3)
GLUCOSE SERPL-MCNC: 150 MG/DL — HIGH (ref 70–99)
HCT VFR BLD CALC: 37.8 % — SIGNIFICANT CHANGE UP (ref 34.5–45)
HGB BLD-MCNC: 12.1 G/DL — SIGNIFICANT CHANGE UP (ref 11.5–15.5)
LABORATORY COMMENT REPORT: SIGNIFICANT CHANGE UP
MCHC RBC-ENTMCNC: 29.4 PG — SIGNIFICANT CHANGE UP (ref 27–34)
MCHC RBC-ENTMCNC: 32 GM/DL — SIGNIFICANT CHANGE UP (ref 32–36)
MCV RBC AUTO: 91.7 FL — SIGNIFICANT CHANGE UP (ref 80–100)
NIGHT BLUE STAIN TISS: SIGNIFICANT CHANGE UP
PLATELET # BLD AUTO: 350 K/UL — SIGNIFICANT CHANGE UP (ref 150–400)
POTASSIUM SERPL-MCNC: 4.2 MMOL/L — SIGNIFICANT CHANGE UP (ref 3.5–5.3)
POTASSIUM SERPL-SCNC: 4.2 MMOL/L — SIGNIFICANT CHANGE UP (ref 3.5–5.3)
RBC # BLD: 4.12 M/UL — SIGNIFICANT CHANGE UP (ref 3.8–5.2)
RBC # FLD: 15.2 % — HIGH (ref 10.3–14.5)
SODIUM SERPL-SCNC: 139 MMOL/L — SIGNIFICANT CHANGE UP (ref 135–145)
SOURCE HSV 1/2: SIGNIFICANT CHANGE UP
SPECIMEN SOURCE: SIGNIFICANT CHANGE UP
WBC # BLD: 15.69 K/UL — HIGH (ref 3.8–10.5)
WBC # FLD AUTO: 15.69 K/UL — HIGH (ref 3.8–10.5)

## 2022-01-21 PROCEDURE — 74176 CT ABD & PELVIS W/O CONTRAST: CPT | Mod: 26

## 2022-01-21 PROCEDURE — 99233 SBSQ HOSP IP/OBS HIGH 50: CPT

## 2022-01-21 PROCEDURE — 99232 SBSQ HOSP IP/OBS MODERATE 35: CPT

## 2022-01-21 PROCEDURE — 99291 CRITICAL CARE FIRST HOUR: CPT

## 2022-01-21 RX ORDER — LEVETIRACETAM 250 MG/1
250 TABLET, FILM COATED ORAL DAILY
Refills: 0 | Status: DISCONTINUED | OUTPATIENT
Start: 2022-01-21 | End: 2022-01-30

## 2022-01-21 RX ORDER — ALBUMIN HUMAN 25 %
50 VIAL (ML) INTRAVENOUS
Refills: 0 | Status: DISCONTINUED | OUTPATIENT
Start: 2022-01-21 | End: 2022-02-02

## 2022-01-21 RX ORDER — ACETAMINOPHEN 500 MG
650 TABLET ORAL EVERY 6 HOURS
Refills: 0 | Status: DISCONTINUED | OUTPATIENT
Start: 2022-01-21 | End: 2022-02-02

## 2022-01-21 RX ORDER — LEVETIRACETAM 250 MG/1
500 TABLET, FILM COATED ORAL AT BEDTIME
Refills: 0 | Status: DISCONTINUED | OUTPATIENT
Start: 2022-01-21 | End: 2022-01-30

## 2022-01-21 RX ORDER — HEPARIN SODIUM 5000 [USP'U]/ML
5000 INJECTION INTRAVENOUS; SUBCUTANEOUS EVERY 12 HOURS
Refills: 0 | Status: DISCONTINUED | OUTPATIENT
Start: 2022-01-21 | End: 2022-01-24

## 2022-01-21 RX ORDER — PANTOPRAZOLE SODIUM 20 MG/1
40 TABLET, DELAYED RELEASE ORAL AT BEDTIME
Refills: 0 | Status: DISCONTINUED | OUTPATIENT
Start: 2022-01-21 | End: 2022-01-23

## 2022-01-21 RX ORDER — DEXMEDETOMIDINE HYDROCHLORIDE IN 0.9% SODIUM CHLORIDE 4 UG/ML
0.2 INJECTION INTRAVENOUS
Qty: 200 | Refills: 0 | Status: DISCONTINUED | OUTPATIENT
Start: 2022-01-21 | End: 2022-01-23

## 2022-01-21 RX ADMIN — LEVETIRACETAM 400 MILLIGRAM(S): 250 TABLET, FILM COATED ORAL at 22:06

## 2022-01-21 RX ADMIN — SODIUM CHLORIDE 75 MILLILITER(S): 9 INJECTION INTRAMUSCULAR; INTRAVENOUS; SUBCUTANEOUS at 18:39

## 2022-01-21 RX ADMIN — PROPOFOL 2.86 MICROGRAM(S)/KG/MIN: 10 INJECTION, EMULSION INTRAVENOUS at 18:39

## 2022-01-21 RX ADMIN — PROPOFOL 2.86 MICROGRAM(S)/KG/MIN: 10 INJECTION, EMULSION INTRAVENOUS at 08:22

## 2022-01-21 RX ADMIN — Medication 100 MILLIGRAM(S): at 17:10

## 2022-01-21 RX ADMIN — LEVETIRACETAM 400 MILLIGRAM(S): 250 TABLET, FILM COATED ORAL at 22:01

## 2022-01-21 RX ADMIN — PROPOFOL 2.86 MICROGRAM(S)/KG/MIN: 10 INJECTION, EMULSION INTRAVENOUS at 03:22

## 2022-01-21 RX ADMIN — PIPERACILLIN AND TAZOBACTAM 25 GRAM(S): 4; .5 INJECTION, POWDER, LYOPHILIZED, FOR SOLUTION INTRAVENOUS at 15:48

## 2022-01-21 RX ADMIN — FOSPHENYTOIN 108 MILLIGRAM(S) PE: 50 INJECTION INTRAMUSCULAR; INTRAVENOUS at 22:06

## 2022-01-21 RX ADMIN — CHLORHEXIDINE GLUCONATE 15 MILLILITER(S): 213 SOLUTION TOPICAL at 22:00

## 2022-01-21 RX ADMIN — Medication 50 MILLILITER(S): at 13:13

## 2022-01-21 RX ADMIN — PANTOPRAZOLE SODIUM 40 MILLIGRAM(S): 20 TABLET, DELAYED RELEASE ORAL at 18:39

## 2022-01-21 RX ADMIN — PROPOFOL 2.86 MICROGRAM(S)/KG/MIN: 10 INJECTION, EMULSION INTRAVENOUS at 13:23

## 2022-01-21 RX ADMIN — Medication 50 MILLIGRAM(S): at 05:33

## 2022-01-21 RX ADMIN — CHLORHEXIDINE GLUCONATE 15 MILLILITER(S): 213 SOLUTION TOPICAL at 11:09

## 2022-01-21 RX ADMIN — SODIUM CHLORIDE 75 MILLILITER(S): 9 INJECTION INTRAMUSCULAR; INTRAVENOUS; SUBCUTANEOUS at 05:34

## 2022-01-21 RX ADMIN — Medication 1 DROP(S): at 12:29

## 2022-01-21 RX ADMIN — Medication 1 DROP(S): at 22:00

## 2022-01-21 RX ADMIN — Medication 2.5 MILLIGRAM(S): at 02:30

## 2022-01-21 RX ADMIN — Medication 50 MILLILITER(S): at 14:16

## 2022-01-21 RX ADMIN — Medication 2 MILLIGRAM(S): at 23:20

## 2022-01-21 RX ADMIN — Medication 2 MILLIGRAM(S): at 02:12

## 2022-01-21 RX ADMIN — Medication 2 MILLIGRAM(S): at 15:16

## 2022-01-21 NOTE — PROGRESS NOTE ADULT - SUBJECTIVE AND OBJECTIVE BOX
Interval History:  1/21/22: Remains intubated    MEDICATIONS  (STANDING):  artificial  tears Solution 1 Drop(s) Both EYES two times a day  atovaquone  Suspension 1500 milliGRAM(s) Oral daily  chlorhexidine 0.12% Liquid 15 milliLiter(s) Oral Mucosa every 12 hours  cloNIDine Patch 0.1 mG/24Hr(s) 1 patch Transdermal every 7 days  fosphenytoin IVPB 200 milliGRAM(s) PE IV Intermittent <User Schedule>  hydroxychloroquine 200 milliGRAM(s) Oral daily  methylPREDNISolone sodium succinate Injectable 50 milliGRAM(s) IV Push daily  pantoprazole    Tablet 40 milliGRAM(s) Oral before breakfast  piperacillin/tazobactam IVPB.. 3.375 Gram(s) IV Intermittent every 12 hours  propofol Infusion 10 MICROgram(s)/kG/Min (2.86 mL/Hr) IV Continuous <Continuous>  sodium chloride 0.9%. 1000 milliLiter(s) (75 mL/Hr) IV Continuous <Continuous>  vancomycin  IVPB 500 milliGRAM(s) IV Intermittent <User Schedule>    MEDICATIONS  (PRN):  acetaminophen  Suppository .. 650 milliGRAM(s) Rectal every 6 hours PRN Temp greater or equal to 38C (100.4F)  albumin human 25% IVPB 50 milliLiter(s) IV Intermittent every 1 hour PRN SBP less than 100  ALBUTerol    90 MICROgram(s) HFA Inhaler 2 Puff(s) Inhalation every 6 hours PRN Shortness of Breath and/or Wheezing  aluminum hydroxide/magnesium hydroxide/simethicone Suspension 30 milliLiter(s) Oral every 4 hours PRN Dyspepsia  LORazepam   Injectable 2 milliGRAM(s) IV Push every 2 hours PRN Agitation  melatonin 3 milliGRAM(s) Oral at bedtime PRN Insomnia  metoprolol tartrate Injectable 2.5 milliGRAM(s) IV Push every 6 hours PRN HR.130 sustained  ondansetron Injectable 4 milliGRAM(s) IV Push every 8 hours PRN Nausea and/or Vomiting  simethicone 80 milliGRAM(s) Chew three times a day PRN Gas      Allergies    morphine (Rash)    Intolerances        PHYSICAL EXAM:  Vital Signs Last 24 Hrs  T(F): 99.7 (01-21-22 @ 12:09)  HR: 109 (01-21-22 @ 14:13)  BP: 106/83 (01-21-22 @ 14:13)  RR: 33 (01-21-22 @ 14:13)    Exam:  Intubated, on ventilator  On propofol  Opens eyes spontaneously  Pupils - minimally reactive  Oculocephalics negative  No withdrawal to noxious stimuli    LABS:                        12.1   15.69 )-----------( 350      ( 21 Jan 2022 06:56 )             37.8     01-21    139  |  94<L>  |  67<H>  ----------------------------<  150<H>  4.2   |  30  |  7.97<H>    Ca    8.4<L>      21 Jan 2022 06:56            RADIOLOGY & ADDITIONAL STUDIES:  CT Head and CT Cspine No Cont (01.18.22 @ 15:44)     IMPRESSION: No evidence of acute hemorrhage mass effect in posterior   fossa or supratentorial region.    No fracture or dislocation involving the cervical spine.    EEG w/ Video Each 12-26 Hours, Unmonitored (12.17.21 @ 10:55)   Impression: Normal 24 hour video EEG recording. A normal study does not   rule out the underlying presence of epilepsy. If clinically indicated, a   repeat studies recommended.    EEG Awake or Drowsy (12.15.21 @ 16:57)  Impression: Abnormal EEG because of slowing of the background activity,   delta range activity, consistent with a diffuse cerebral dysfunction,   maybe on the basis of a diffuse metabolic, toxic or structural   abnormality.  Generalized and transition epileptiform discharges with are potentially   epileptogenic.  Above findings can be seen in pulse 18 gentle states, abnormality   encephalopathies. Clinical correlation recommended. A prolonged, video   EEG is recommended to further clarify abnormalities.

## 2022-01-21 NOTE — PROGRESS NOTE ADULT - ASSESSMENT
Patient with history ESRD on dialysis  with altered mental status,   fevers  likely aspiration pneumonia  Acute respiratory failure  seizures  r/o sepsis    1. now intubated, will check sputum culture, minimal infiltrate on cxr, LP negative  2. seizures on dilantin,    3. check cultures - blood negative, check sputum, some pyelo on ct  4. on vanco, zosyn, 3 mepron,  2, ID note appreciated  5. plaquenil for vasulitis, chronic steroids  6.  Dialysis today  7. will re ct abdomen, seems like she has abdominal pain, inc ng residual

## 2022-01-21 NOTE — PROGRESS NOTE ADULT - ASSESSMENT
A/P: 42 y/o F with PMx significant for MRSA bacteremia, COPD, Hypertension, ESRD on hemodialysis (MWF) (kidney bx c/w ANCA vasculitis), cocaine and heroin abuse, depression, GERD, epilepsy, Raynaud's syndrome, Rheumatoid arthritis, SLE, seizure disorder (hospitalized 12/15/21 through 12/18/21 for seizure, was discharged on Dilantin), presented to ED via EMS for AMS on 1/18/21, patient's brother found her laying on the floor and was unresponsive, foaming from the mouth; when EMS picked her up yesterday there were reportedly needles underneath her. Patient's brother feels that her seizures started 8 years ago from drug use, but has not seen her take drugs and has never witnessed one of her seizures firsthand.     Seizures  -Patient's brother reported that seizures only occurred in setting of drug use  -However, EEGs during prior admissions did show evidence of epileptiform activity.  -Dilantin level is 18.6 and corrects to 27.4 for albumin of 2.9 and creatine clearance less than 20.  -I do think that it may be difficult to maintain a therapeutic dilantin level given issues of compliance, low albumin and dialysis.  -Will d/c Dilantin and restart Keppra. Can give 500 mg daily and 250 mg additional after dialysis.  -If she has significant mood disturbance with Keppra can consider change to Depakote or Zonisamide    Encephalopathy  -Poor mental status, fevers  -LP performed. No sign of CNS infection.      Dr. Pena will take over neurology service on 1/22.  Please call if additional input is needed or if reevaluation is needed when patient is off sedation.

## 2022-01-21 NOTE — PROGRESS NOTE ADULT - SUBJECTIVE AND OBJECTIVE BOX
REASON FOR VISIT: Elevated Troponin    40 y/o F PMx significant for MRSA bacteremia, COPD, Hypertension, ESRD on hemodialysis (MWF) (kidney bx c/w ANCA vasculitis), cocaine and heroin abuse, depression, GERD, epilepsy, Raynaud's syndrome, Rheumatoid arthritis, SLE, seizure disorder, hospitalized 12/15/21 through 12/18/21 for seizure, presented in the ER on 01/18 via EMS  for AMS.     -Pt w leukocytosis, sinus tachycardia, high sensitivity trop of 286, elevated BNP, + cocaine and THC on Utox. Cardiology consulted for further evaluation of elevated trop/BNP.     01/20: Overnight events noted, patient became hypoxic despite ventimask, gurgling secretions w elevated RR, obtunded. Patient was transferred to CCU and intubated. Became hypotensive before intubation requiring pressors, currently off pressors. Patient remains in CCU, intubated and sedated, tachy in the 120's at bedside, BP 97/72.   1/21/22  patient is remain intubated , off of pressors , on o2 45%, febrile episodes       MEDICATIONS  (STANDING):  amLODIPine   Tablet 10 milliGRAM(s) Oral daily  artificial  tears Solution 1 Drop(s) Both EYES two times a day  atovaquone  Suspension 1500 milliGRAM(s) Oral daily  chlorhexidine 0.12% Liquid 15 milliLiter(s) Oral Mucosa every 12 hours  cloNIDine Patch 0.1 mG/24Hr(s) 1 patch Transdermal every 7 days  fosphenytoin IVPB 200 milliGRAM(s) PE IV Intermittent <User Schedule>  hydroxychloroquine 200 milliGRAM(s) Oral daily  methylPREDNISolone sodium succinate Injectable 50 milliGRAM(s) IV Push daily  pantoprazole    Tablet 40 milliGRAM(s) Oral before breakfast  piperacillin/tazobactam IVPB.. 3.375 Gram(s) IV Intermittent every 12 hours  propofol Infusion 10 MICROgram(s)/kG/Min (2.86 mL/Hr) IV Continuous <Continuous>  sodium chloride 0.9%. 1000 milliLiter(s) (75 mL/Hr) IV Continuous <Continuous>  vancomycin  IVPB 500 milliGRAM(s) IV Intermittent <User Schedule>    MEDICATIONS  (PRN):  acetaminophen  Suppository .. 650 milliGRAM(s) Rectal every 6 hours PRN Temp greater or equal to 38C (100.4F)  ALBUTerol    90 MICROgram(s) HFA Inhaler 2 Puff(s) Inhalation every 6 hours PRN Shortness of Breath and/or Wheezing  aluminum hydroxide/magnesium hydroxide/simethicone Suspension 30 milliLiter(s) Oral every 4 hours PRN Dyspepsia  LORazepam   Injectable 2 milliGRAM(s) IV Push every 2 hours PRN Agitation  melatonin 3 milliGRAM(s) Oral at bedtime PRN Insomnia  metoprolol tartrate Injectable 2.5 milliGRAM(s) IV Push every 6 hours PRN HR.130 sustained  ondansetron Injectable 4 milliGRAM(s) IV Push every 8 hours PRN Nausea and/or Vomiting  simethicone 80 milliGRAM(s) Chew three times a day PRN Gas    Vital Signs Last 24 Hrs  T(C): 37.2 (21 Jan 2022 08:06), Max: 38.5 (20 Jan 2022 21:00)  T(F): 98.9 (21 Jan 2022 08:06), Max: 101.3 (20 Jan 2022 21:00)  HR: 124 (21 Jan 2022 06:54) (111 - 129)  BP: 111/55 (21 Jan 2022 06:00) (41/15 - 126/84)  BP(mean): 60 (21 Jan 2022 06:00) (22 - 94)  RR: 30 (21 Jan 2022 06:00) (22 - 34)  SpO2: 96% (21 Jan 2022 06:54) (91% - 99%)    I&O's Summary    20 Jan 2022 07:01  -  21 Jan 2022 07:00  --------------------------------------------------------  IN: 1498.8 mL / OUT: 2801 mL / NET: -1302.2 mL        Physical Exam   Gen: sedated  vented   CV: sinus tachycardia, s1/s2, no M/R/G  Pulm: crackles resolving, currently intubated   Abd: normoactive bowel sounds in all 4 quadrants, soft, nontender, nondistended, no rebound, no guarding, no masses  Extremities: warm, no pedal edema, pedal pulses palpable, LLE contraction, uses wheelchair to ambulate previously  Skin: nl warm and dry, no wounds   Neuro: sedated       Labs                        12.1   15.69 )-----------( 350      ( 21 Jan 2022 06:56 )             37.8     01-21    139  |  94<L>  |  67<H>  ----------------------------<  150<H>  4.2   |  30  |  7.97<H>    Ca    8.4<L>      21 Jan 2022 06:56      Culture Results:   Testing in progress (01-20-22 @ 14:32)  Culture Results:   No growth to date. (01-18-22 @ 17:17)  Culture Results:   <10,000 CFU/mL Normal Urogenital Dania (01-18-22 @ 14:06)  Culture Results:   No growth to date. (01-18-22 @ 14:06)        < from: Xray Chest 1 View- PORTABLE-Urgent (Xray Chest 1 View- PORTABLE-Urgent .) (01.18.22 @ 15:13) >  IMPRESSION: Large right jugular line. Otherwise unremarkable study.    < end of copied text >      < from: CT Head No Cont (01.18.22 @ 15:44) >  IMPRESSION: No evidence of acute hemorrhage mass mass effect in posterior   fossa or supratentorial region.    No fracture or dislocation involving the cervical spine.    < end of copied text >    < from: CT Chest No Cont (01.18.22 @ 15:52) >  IMPRESSION: New subtle compression deformities of the superior T5 and T6   endplates. No evidence of visceral organ injury.      < end of copied text >    < from: TTE Echo Complete w/o Contrast w/ Doppler (01.19.22 @ 08:38) >   Summary     The left ventricle is normal in size.   Moderately reduced left ventricular systolic function.   Estimated left ventricular ejection fraction is 40-45 %.   Overall LV dysfunction appears global.   Normal appearing right ventricle structure and function.   Normal appearing mitral valve structure and function.   Mild (1+) mitral regurgitation is present.   Normal appearing tricuspid valve structure and function.   Trace tricuspid valve regurgitation is present.   No evidence of pericardial effusion.     Signature     ----------------------------------------------------------------   Electronically signed by Jamie Pastrana DO(Interpreting   physician) on 01/19/2022 10:04 AM    < end of copied text >        monitor sinus tachycardia persistent

## 2022-01-21 NOTE — PROGRESS NOTE ADULT - SUBJECTIVE AND OBJECTIVE BOX
Date of service: 22 @ 12:36    pt seen and examined  intubated, on ventilatory support  ms slightly better, on less sedation  temps down, was febrile overnight  + thick resp secretions    ROS: unable to obtain d/t medical condition     MEDICATIONS  (STANDING):  artificial  tears Solution 1 Drop(s) Both EYES two times a day  atovaquone  Suspension 1500 milliGRAM(s) Oral daily  chlorhexidine 0.12% Liquid 15 milliLiter(s) Oral Mucosa every 12 hours  cloNIDine Patch 0.1 mG/24Hr(s) 1 patch Transdermal every 7 days  fosphenytoin IVPB 200 milliGRAM(s) PE IV Intermittent <User Schedule>  hydroxychloroquine 200 milliGRAM(s) Oral daily  methylPREDNISolone sodium succinate Injectable 50 milliGRAM(s) IV Push daily  pantoprazole    Tablet 40 milliGRAM(s) Oral before breakfast  piperacillin/tazobactam IVPB.. 3.375 Gram(s) IV Intermittent every 12 hours  propofol Infusion 10 MICROgram(s)/kG/Min (2.86 mL/Hr) IV Continuous <Continuous>  sodium chloride 0.9%. 1000 milliLiter(s) (75 mL/Hr) IV Continuous <Continuous>  vancomycin  IVPB 500 milliGRAM(s) IV Intermittent <User Schedule>    Vital Signs Last 24 Hrs  T(C): 37.6 (2022 12:09), Max: 38.5 (2022 21:00)  T(F): 99.7 (2022 12:09), Max: 101.3 (2022 21:00)  HR: 119 (2022 12:17) (111 - 129)  BP: 106/85 (2022 12:17) (105/43 - 126/84)  BP(mean): 102 (2022 11:00) (52 - 102)  RR: 19 (2022 12:17) (19 - 32)  SpO2: 99% (2022 12:17) (92% - 99%)    PE:  Constitutional: sedated/intubated   HEENT: NC/AT, EOMI, PERRLA, conjunctivae clear; ears and nose atraumatic; pharynx benign  Neck: supple; thyroid not palpable  Back: no tenderness  Respiratory: decreased breath sounds   Cardiovascular: S1S2 regular, no murmurs  Abdomen: soft, not tender, not distended, positive BS; liver and spleen WNL  Genitourinary: no suprapubic tenderness  Lymphatic: no LN palpable  Musculoskeletal: no muscle tenderness, no joint swelling or tenderness  Extremities: no pedal edema  Neurological/ Psychiatric: no focal deficits  Skin: no rashes; no palpable lesions perm catheter in place    Labs: all available labs reviewed                                              12.1   15.69 )-----------( 350      ( 2022 06:56 )             37.8         139  |  94<L>  |  67<H>  ----------------------------<  150<H>  4.2   |  30  |  7.97<H>    Ca    8.4<L>      2022 06:56      LIVER FUNCTIONS - ( 2022 14:06 )  Alb: 2.9 g/dL / Pro: 8.5 gm/dL / ALK PHOS: 162 U/L / ALT: 19 U/L / AST: 24 U/L / GGT: x           Urinalysis Basic - ( 2022 14:06 )    Color: Yellow / Appearance: Slightly Turbid / S.010 / pH: x  Gluc: x / Ketone: Trace  / Bili: Negative / Urobili: Negative   Blood: x / Protein: 500 mg/dL / Nitrite: Negative   Leuk Esterase: Trace / RBC: 25-50 /HPF / WBC 6-10   Sq Epi: x / Non Sq Epi: Occasional / Bacteria: Few    Culture - Blood (22 @ 17:17)   Specimen Source: .Blood None   Culture Results:   No growth to date.   Culture - Blood (22 @ 14:06)   Specimen Source: .Blood Blood-Peripheral   Culture Results:   No growth to date.   Culture - CSF with Gram Stain . (22 @ 14:32)   Gram Stain:   No polymorphonuclear cells seen   No organisms seen   by cytocentrifuge   Specimen Source: .CSF CSF       Radiology: all available radiological tests reviewed  < from: Xray Chest 1 View- PORTABLE-Urgent (Xray Chest 1 View- PORTABLE-Urgent .) (22 @ 07:17) >    ACC: [id number] EXAM:  XR CHEST PORTABLE URGENT 1V                          PROCEDURE DATE:  2022          INTERPRETATION:  Chest one view    HISTORY: NG tube placement    COMPARISON STUDY: 2022    Frontal view of the chest shows the heartto be normal in size. Right   dialysis catheter tip projects in the lower right atrium. Endotracheal   tube reaches the lower half of the trachea. Nasogastric tube reaches the   central stomach. Right upper quadrant clips are again noted.    The lungs show clear right lung with small left effusion and there is no   evidence of pneumothorax nor right pleural effusion.    IMPRESSION:  NG tube to stomach.      < end of copied text >    ACC: [id number] EXAM:  CT ABDOMEN AND PELVIS                        ACC: [id number] EXAM:  CT CHEST                          PROCEDURE DATE:  2022          INTERPRETATION:  CLINICAL INFORMATION: Status post fall.    COMPARISON: Noncontrast CT of the thorax dated 2021 and   noncontrast CT of the abdomen and pelvis dated 2021.    CONTRAST/COMPLICATIONS:  IV Contrast: NONE  Oral Contrast: NONE  Complications: None reported at time of study completion    PROCEDURE:  CT of the Chest, Abdomen and Pelvis was performed.  Sagittal and coronal reformats were performed.    FINDINGS:  CHEST:  LUNGS AND LARGE AIRWAYS: Patent central airways. Trace tree-in-bud   nodularity in the right upper lobe posterolaterally, compatible with   terminal bronchiolitis. Minimal dependent atelectasis in the left lower   lobe.  PLEURA: No pleural effusion. No pneumothorax or pneumomediastinum.  VESSELS: Right internal jugular dialysis catheter extending to the right   atrial/IVC junction. Within normal limits.  HEART: Heart size is normal. No pericardial effusion.  MEDIASTINUM AND MARY JANE: No lymphadenopathy. Shotty bilateral axillary and   subpectoral lymph nodes.  CHEST WALL AND LOWER NECK: Within normal limits.    ABDOMEN AND PELVIS:  LIVER: Within normal limits.  BILE DUCTS: Normal caliber.  GALLBLADDER: Removed.  SPLEEN: Within normal limits.  PANCREAS: Within normal limits.  ADRENALS: Within normal limits.  KIDNEYS/URETERS: Moderate bilateral perinephric stranding. Otherwise,   within normal limits.    BLADDER: Collapsed.  REPRODUCTIVE ORGANS: Uterus and adnexa within normal limits.    BOWEL: Fluid-filled stomach and proximal duodenum. There is mild diffuse   fluid distention of small bowel loops. The colon is predominantly   collapsed. No bowel obstruction.  PERITONEUM: No ascites.  VESSELS: Within normal limits.  RETROPERITONEUM/LYMPH NODES: Again are noted enlarged left para-aortic   lymph nodes measuring up to 2.2 x 1.7 cm on image 93.  ABDOMINAL WALL: Within normal limits.  BONES: New subtle compression deformities of the superior T5 and T6   endplates. Bilateral shoulder effusions. Erosive changes of the left   humeral head. Degenerative joint space narrowing of the right hip.    IMPRESSION: New subtle compression deformities of the superior T5 and T6   endplates. No evidence of visceral organ injury.          Advanced directives addressed: full resuscitation

## 2022-01-21 NOTE — PROGRESS NOTE ADULT - ASSESSMENT
42 y/o F PMx significant for MRSA bacteremia, COPD, Hypertension, ESRD on hemodialysis (MWF) (kidney bx c/w ANCA vasculitis), cocaine and heroin abuse, depression, GERD, epilepsy, Raynaud's syndrome, Rheumatoid arthritis, SLE, seizure disorder, hospitalized 12/15/21 through 12/18/21 for seizure, presented in the ER via EMS  for AMS. The patient family found her on the floor unresponsive after they left her alone for an hour. The patient respond only to painful stimulation. The patient was evaluated by ICU practitioner and recommended CICU admission. Here noted with elevated wbc ct 29, LA 5.7, imaging remarkable for new subtle compression deformities of superior t5/t6 endplates, RUL bronchiolitis, LLL atelectasis, UA with pyuria, b/l perinephric stranding noted on CT, was given IV vanco/zosyn, is on steroids, on mepron for pcp prophlyaxis.     1. sepsis. encephalopathy. aspiration? pyelonephritis? ESRD. ANCA vasculitis. bronchiolitis. COPD. substance abuse  - imaging reviewed, neurology f/u appreciated  - LP results reviewed, CSF PCR (-), CSF cx (-) no evidence of CNS infection   - on mepron for pcp prophylaxis  -  IV zosyn [id number] #3  - on vancomycin post HD #3   - continue with abx coverage   - blood cx, urine cx no growth   - monitor temps  - tolerating abx well so far; no side effects noted  - reason for abx use and side effects reviewed with patient  - supportive care  - fu cbc    2. other issues - care per medicine

## 2022-01-21 NOTE — PROGRESS NOTE ADULT - SUBJECTIVE AND OBJECTIVE BOX
Patient is a 41y old  Female who presents with a chief complaint of AMS, sepsis, ESKD (21 Jan 2022 14:15)      BRIEF HOSPITAL COURSE:  The patient a  42 y/o F PMx significant for MRSA bacteremia, COPD, Hypertension, ESRD on hemodialysis (MWF) (kidney bx c/w ANCA vasculitis), cocaine and heroin abuse, depression, GERD, epilepsy, Raynaud's syndrome, Rheumatoid arthritis, SLE, seizure disorder, hospitalized 12/15/21 through 12/18/21 for seizure, presented in the ER via EMS  for AMS on 1/18/22 after being found unresponsive at home. In the ED, she was only reactive to painful stimuli. CT head was negative. AMS likely secondary to drug use vs sepsis. Cultures drawn and broad spectrum antibiotics were started. Patient's utox was positive for THC and cocaine. Trops were also elevated to 286. Cardiology signed off as they ruled the troponin to be 2/2 demand injury from cocaine use.  Imaging was remarkable for new subtle compression deformities of superior t5/t6 endplates, RUL bronchiolitis, LLL atelectasis, UA with pyuria, b/l perinephric stranding noted on CT. Meropenem was added to the vanc and zosyn for PCP prophylaxis. On 1/20 patient became hypoxic and was gurgling secretions, so she was intubated. She became hypotensive and required pressors. Neurology recommended continuing seizure prophylaxis, as it is unclear whether the patient has a seizure disorder.    Events last 24 hours: Patient remains intubated and sedated. She was dialyzed today (took off ~1kg) but it had to be stopped as the patient became hypotensive and hypoxic. She intermittently has been having temps. She has sinus tachycardia in the 120s intermittently.     PAST MEDICAL & SURGICAL HISTORY:  Rheumatoid arthritis    H/O Raynaud&#x27;s syndrome    Heroin abuse    Smoker    Sepsis    HTN (hypertension)    COPD (chronic obstructive pulmonary disease)    Depression    Epilepsy    GERD (gastroesophageal reflux disease)    Bacteremia    Systemic lupus erythematosus    Aphonia    H/O tubal ligation    H/O appendicitis    Gall bladder stones    H/O tracheostomy    S/P percutaneous endoscopic gastrostomy (PEG) tube placement      Allergies    morphine (Rash)    Intolerances      FAMILY HISTORY:  Family history of cardiomyopathy (Mother)        Social History: Drug use     Review of Systems:  Unable to obtain     Vitals During Exam:   HR: 110s  BP: 119/82  RR: 26  sPO2: 100%    Physical Examination:    General: Intubated and sedated    HEENT: Pupils equal, reactive to light.  Symmetric.    PULM: Clear to auscultation bilaterally, no significant sputum production    CVS: Tachycardic. Regular rhythm, no murmurs, rubs, or gallops    ABD: Soft, nondistended, nontender, normoactive bowel sounds, no masses    EXT: No edema, nontender    SKIN: Warm and well perfused, no rashes noted.    NEURO: Intubated and sedated    Medications:  atovaquone  Suspension 1500 milliGRAM(s) Oral daily  hydroxychloroquine 200 milliGRAM(s) Oral daily  piperacillin/tazobactam IVPB.. 3.375 Gram(s) IV Intermittent every 12 hours  vancomycin  IVPB 500 milliGRAM(s) IV Intermittent <User Schedule>    cloNIDine Patch 0.1 mG/24Hr(s) 1 patch Transdermal every 7 days  metoprolol tartrate Injectable 2.5 milliGRAM(s) IV Push every 6 hours PRN    ALBUTerol    90 MICROgram(s) HFA Inhaler 2 Puff(s) Inhalation every 6 hours PRN    acetaminophen  Suppository .. 650 milliGRAM(s) Rectal every 6 hours PRN  fosphenytoin IVPB 200 milliGRAM(s) PE IV Intermittent <User Schedule>  levETIRAcetam  IVPB 500 milliGRAM(s) IV Intermittent at bedtime  levETIRAcetam  IVPB 250 milliGRAM(s) IV Intermittent daily PRN  LORazepam   Injectable 2 milliGRAM(s) IV Push every 2 hours PRN  melatonin 3 milliGRAM(s) Oral at bedtime PRN  ondansetron Injectable 4 milliGRAM(s) IV Push every 8 hours PRN  propofol Infusion 10 MICROgram(s)/kG/Min IV Continuous <Continuous>        aluminum hydroxide/magnesium hydroxide/simethicone Suspension 30 milliLiter(s) Oral every 4 hours PRN  pantoprazole  Injectable 40 milliGRAM(s) IV Push at bedtime  simethicone 80 milliGRAM(s) Chew three times a day PRN      methylPREDNISolone sodium succinate Injectable 50 milliGRAM(s) IV Push daily    albumin human 25% IVPB 50 milliLiter(s) IV Intermittent every 1 hour PRN  sodium chloride 0.9%. 1000 milliLiter(s) IV Continuous <Continuous>      artificial  tears Solution 1 Drop(s) Both EYES two times a day  chlorhexidine 0.12% Liquid 15 milliLiter(s) Oral Mucosa every 12 hours        Mode: AC/ CMV (Assist Control/ Continuous Mandatory Ventilation)  RR (machine): 24  TV (machine): 390  FiO2: 50  PEEP: 5  PS: 5  ITime: 60  MAP: 11  PIP: 25      ICU Vital Signs Last 24 Hrs  T(C): 37.6 (21 Jan 2022 15:45), Max: 38.3 (20 Jan 2022 22:00)  T(F): 99.6 (21 Jan 2022 15:45), Max: 101 (20 Jan 2022 22:00)  HR: 120 (21 Jan 2022 20:20) (104 - 129)  BP: 119/82 (21 Jan 2022 18:00) (74/49 - 121/85)  BP(mean): 91 (21 Jan 2022 18:00) (54 - 102)  ABP: --  ABP(mean): --  RR: 26 (21 Jan 2022 18:00) (11 - 40)  SpO2: 100% (21 Jan 2022 20:20) (70% - 100%)    Vital Signs Last 24 Hrs  T(C): 37.6 (21 Jan 2022 15:45), Max: 38.3 (20 Jan 2022 22:00)  T(F): 99.6 (21 Jan 2022 15:45), Max: 101 (20 Jan 2022 22:00)  HR: 120 (21 Jan 2022 20:20) (104 - 129)  BP: 119/82 (21 Jan 2022 18:00) (74/49 - 121/85)  BP(mean): 91 (21 Jan 2022 18:00) (54 - 102)  RR: 26 (21 Jan 2022 18:00) (11 - 40)  SpO2: 100% (21 Jan 2022 20:20) (70% - 100%)    ABG - ( 20 Jan 2022 08:38 )  pH, Arterial: 7.49  pH, Blood: x     /  pCO2: 33    /  pO2: 51    / HCO3: 25    / Base Excess: 2.2   /  SaO2: 84                  I&O's Detail    20 Jan 2022 07:01  -  21 Jan 2022 07:00  --------------------------------------------------------  IN:    IV PiggyBack: 150 mL    Propofol: 448.8 mL    sodium chloride 0.9%: 900 mL  Total IN: 1498.8 mL    OUT:    Gastric Aspirate - Discarded (mL): 2800 mL    Incontinent per Diaper, Weight (mL): 1 mL    Voided (mL): 0 mL  Total OUT: 2801 mL    Total NET: -1302.2 mL      21 Jan 2022 07:01  -  21 Jan 2022 21:41  --------------------------------------------------------  IN:    IV PiggyBack: 200 mL    Propofol: 164 mL    sodium chloride 0.9%: 769 mL  Total IN: 1133 mL    OUT:    Gastric Aspirate - Discarded (mL): 300 mL  Total OUT: 300 mL    Total NET: 833 mL            LABS:                        12.1   15.69 )-----------( 350      ( 21 Jan 2022 06:56 )             37.8     01-21    139  |  94<L>  |  67<H>  ----------------------------<  150<H>  4.2   |  30  |  7.97<H>    Ca    8.4<L>      21 Jan 2022 06:56            CAPILLARY BLOOD GLUCOSE            CULTURES:  Culture Results:   No growth (01-20 @ 14:32)  Culture Results:   Testing in progress (01-20 @ 14:32)  Culture Results:   No growth to date. (01-18 @ 17:17)  Culture Results:   <10,000 CFU/mL Normal Urogenital Dania (01-18 @ 14:06)  Culture Results:   No growth to date. (01-18 @ 14:06)        RADIOLOGY: < from: Xray Chest 1 View- PORTABLE-Urgent (Xray Chest 1 View- PORTABLE-Urgent .) (01.20.22 @ 07:17) >    ACC: [id number] EXAM:  XR CHEST PORTABLE URGENT 1V                          PROCEDURE DATE:  01/20/2022          INTERPRETATION:  Chest one view    HISTORY: NG tube placement    COMPARISON STUDY: 1/19/2022    Frontal view of the chest shows the heartto be normal in size. Right   dialysis catheter tip projects in the lower right atrium. Endotracheal   tube reaches the lower half of the trachea. Nasogastric tube reaches the   central stomach. Right upper quadrant clips are again noted.    The lungs show clear right lung with small left effusion and there is no   evidence of pneumothorax nor right pleural effusion.    IMPRESSION:  NG tube to stomach.        Thank you for the courtesy of this referral.    --- End of Report ---            SHAWN WHEATLEY; Attending Interventional Radiologist  This document has been electronically signed. Jan 20 2022  1:48PM    < end of copied text >  < from: CT Angio Chest PE Protocol w/ IV Cont (01.19.22 @ 20:17) >    ACC: [id number] EXAM:  CT ANGIO CHEST PULM ART [facility]                          PROCEDURE DATE:  01/19/2022          INTERPRETATION:  CLINICAL INFORMATION: Seizure.    COMPARISON: Noncontrast CT of the thorax dated January 18, 2022.    CONTRAST/COMPLICATIONS:  IV Contrast: Omnipaque 350  50 cc administered   50 cc discarded  Oral Contrast: NONE  Complications: None reported at time of study completion    PROCEDURE:  CT Angiography of the Chest was performed as per clinician request.  Sagittal and coronal reformats were performed as well as 3D (MIP)   reconstructions.    FINDINGS:    LUNGS AND AIRWAYS: There is moderate mucoid debris throughout the   bronchus intermedius and bilateral lower lobe bronchi with tree-in-bud   nodularity, right greater than left, compatible with aspiration or   colitis. No confluent airspace opacity is identified.  PLEURA: No pleural effusion. No pneumothorax or pneumomediastinum.  MEDIASTINUM AND MARY JANE: No lymphadenopathy.  VESSELS: There is no evidence of filling defect in the first, second, or   third order branches of the pulmonary arteries. The pulmonary trunk and   main pulmonary arteries are unremarkable. The thoracic aorta and great   vessels are unremarkable. Right internal jugular dialysis catheter   extending to the intrahepatic portion of the IVC.  HEART: Heart size is normal. No pericardial effusion.  CHEST WALL AND LOWER NECK: Within normal limits.  VISUALIZED UPPER ABDOMEN: The gallbladder has been removed. Again are   noted prominent left para-aortic lymph nodes.  BONES: Within normal limits.    IMPRESSION:  1. Mucoid debris in the lower lobe airways with associated bronchiolitis.  2. No evidence of pulmonary embolism.    --- End of Report ---            ONEL CLIFFORD MD; Attending Radiologist  This document has been electronically signed. Jan 19 2022  8:32PM    < end of copied text >

## 2022-01-21 NOTE — PROGRESS NOTE ADULT - ASSESSMENT
40 y/o F PMx significant for MRSA bacteremia, COPD, Hypertension, ESRD on hemodialysis (MWF) (kidney bx c/w ANCA vasculitis), cocaine and heroin abuse, depression, GERD, epilepsy, Raynaud's syndrome, Rheumatoid arthritis, SLE, seizure disorder, hospitalized 12/15/21 through 12/18/21 for seizure, presented in the ER on 01/18 via EMS  for AMS.     -Pt w leukocytosis, sinus tachycardia, high sensitivity trop of 286, elevated BNP, + cocaine and THC on Utox. Cardiology consulted for further evaluation of elevated trop/BNP. Patient now in CCU intubation, most likely 2/2 aspiration PNA and inability to protect airway.     #LV Dysfunction  ( New )  -Echo demonstrating  Moderately reduced left ventricular systolic function. Estimated left ventricular ejection fraction is 40-45 %. Overall LV dysfunction appears global.  -Changes worsened when compared to Echo from November, not present in Echo from October.   -Systolic dysfunction may be 2/2 acute sepsis picture, may improve if patient improves.   -Once patient is more stable, if LV systolic function does not improve, patient may benefit from ischemic work-up    #Sinus Tachycardia  -Pt on cardiac monitor, pulse in the 120's  -EKG notable for sinus tachy, no ST segment changes or heart blocks appreciated  -Most likely 2/2 sepsis/hypotension  -Continue to monitor, Metoprolol PRN    #Hypotension  -Most likely 2/2 acute infection/sedation from intubation  -Extremities warm, currently off pressors  -Continue to monitor fluid status closely  -D/C Amlodipine and home antihypertensives    #Elevated troponin  -High sensitivity trop 286  -Negligible. Most likely 2/2 Type II MI, demand injury due to cocaine abuse. EKG notable for sinus tachy, no ST segment changes appreciated    #Elevated BNP  -Unclear clinical significance given ESRD on HD  -Not clinically fluid overloaded on physical exam

## 2022-01-21 NOTE — PROGRESS NOTE ADULT - ASSESSMENT
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  on HD MWF. Recent admission for MRSA bacteremia - permcath was replaced . 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  on HD MWF. Recent admission for MRSA bacteremia - permcath was replaced . 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

## 2022-01-21 NOTE — PROGRESS NOTE ADULT - ASSESSMENT
ASSESSMENT:  BRIEF HOSPITAL COURSE:  The patient a  42 y/o F PMx significant for MRSA bacteremia, COPD, Hypertension, ESRD on hemodialysis (MWF) (kidney bx c/w ANCA vasculitis), cocaine and heroin abuse, depression, GERD, epilepsy, Raynaud's syndrome, Rheumatoid arthritis, SLE, seizure disorder, hospitalized 12/15/21 through 12/18/21 for seizure, presented in the ER via EMS  for AMS on 1/18/22 after being found unresponsive at home. In the ED, she was only reactive to painful stimuli. CT head was negative. AMS likely secondary to drug use vs sepsis. Cultures drawn and broad spectrum antibiotics were started. Patient's utox was positive for THC and cocaine. Trops were also elevated to 286. Cardiology signed off as they ruled the troponin to be 2/2 demand injury from cocaine use.  Imaging was remarkable for new subtle compression deformities of superior t5/t6 endplates, RUL bronchiolitis, LLL atelectasis, UA with pyuria, b/l perinephric stranding noted on CT. Meropenem was added to the vanc and zosyn for PCP prophylaxis. On 1/20 patient became hypoxic and was gurgling secretions, so she was intubated. She became hypotensive and required pressors. Neurology recommended continuing seizure prophylaxis, as it is unclear whether the patient has a seizure disorder.    Problems:  -Sepsis vs encephalopathy (AMS)  -Seizure disorder  -Drug use  -Pyelonephritis   -ESRD   -Lumbar compression fractures   -Demand ischemia   -Acute hypoxic resp failure     PLAN:    NEURO:  -Neurology following  -Unclear if patient has seizure history   -Continue antiepileptics (Keppra and Fosphenytoin)  -Wean propofol as able   -Unable to assess mental status as patient is intubated and sedation  -CT head was negative on 1/18    RESPIRATORY:   -Intubated for acute hypoxic respiratory failure  -Actively weaning ventilator settings as able   -CT chest shows RUL bronchiolitis, LLL atelectasis. Pt on broad spectrum abx.  CARDIOVASCULAR:    GI/NUTRITION:  -Tube feeds  -CT abdomen/pelvis to follow up on to r  GENITOURINARY/RENAL:    HEMATOLOGIC:    INFECTIOUS DISEASE:    ENDOCRINE:    Patient disposition:  Code Status:    *** minutes of critical care time spent providing medical care for the patient's acute illness and/or conditions that impairs at least one vital organ system and/or poses a high risk of imminent or life threatining deterioration in the patients condition. It includes time spent evaluating and treating the patients acute illness as well as time spent reviewing labs, radiology, discussing goals of care with patient and/or patients family, and discussing the case with a multidisciplinary team in an effort to prevent further life threatening deterioration or end organ damage. This time is independent of any procedures performed.    Discussed with attending,  ***     ASSESSMENT:  BRIEF HOSPITAL COURSE:  The patient a  42 y/o F PMx significant for MRSA bacteremia, COPD, Hypertension, ESRD on hemodialysis (MWF) (kidney bx c/w ANCA vasculitis), cocaine and heroin abuse, depression, GERD, epilepsy, Raynaud's syndrome, Rheumatoid arthritis, SLE, seizure disorder, hospitalized 12/15/21 through 12/18/21 for seizure, presented in the ER via EMS  for AMS on 1/18/22 after being found unresponsive at home. In the ED, she was only reactive to painful stimuli. CT head was negative. AMS likely secondary to drug use vs sepsis. Cultures drawn and broad spectrum antibiotics were started. Patient's utox was positive for THC and cocaine. Trops were also elevated to 286. Cardiology signed off as they ruled the troponin to be 2/2 demand injury from cocaine use.  Imaging was remarkable for new subtle compression deformities of superior t5/t6 endplates, RUL bronchiolitis, LLL atelectasis, UA with pyuria, b/l perinephric stranding noted on CT. Meropenem was added to the vanc and zosyn for PCP prophylaxis. On 1/20 patient became hypoxic and was gurgling secretions, so she was intubated. She became hypotensive and required pressors. Neurology recommended continuing seizure prophylaxis, as it is unclear whether the patient has a seizure disorder.    Problems:  -Sepsis vs encephalopathy (AMS)  -Seizure disorder  -Drug use  -Pyelonephritis   -ESRD   -Lumbar compression fractures   -Demand ischemia   -Acute hypoxic resp failure     PLAN:    NEURO:  -Neurology following  -Unclear if patient has seizure history   -Continue antiepileptics (Keppra and Fosphenytoin)  -Wean propofol as able   -Unable to assess mental status as patient is intubated and sedation  -CT head was negative on 1/18  -Follow up on LP results.     RESPIRATORY:   -Intubated for acute hypoxic respiratory failure  -Actively weaning ventilator settings as able   -CT chest shows RUL bronchiolitis, LLL atelectasis. Pt on broad spectrum abx.    CARDIOVASCULAR:  -Sinus tachycardia likely secondary to infection. May also be agitation as we are weaning Propofol.  -Trops peaked. Cardiology states likely due to demand ischemia.   -Off pressors at this time. Keep MAP >65.  -Will wait to restart Amlodopine as pt was hypotensive earlier today. Keep pressure <140 systolic.  -Plaquenil and Atovaquone for vasculitis.     GI/NUTRITION:  -Tube feeds  -CT abdomen/pelvis to rule out obstruction. Patient having increased secretions from OGT. Pending read.  -NPO for now    GENITOURINARY/RENAL:  -Pyelonephritis. ID following.  -ESRD. Nephrology following. Dialysis MWF. Dialyzed today.     HEMATOLOGIC:  -Restart heparin SQ for DVT prophylaxis  -H&H stable. Continue to trend.    INFECTIOUS DISEASE:  -Sepsis.   -On Vanco, Zosyn, Meropenem.  -F/u LP  -Neg blood cultures and urine culture.    ENDOCRINE:  -Continue steroids. Wean when able.     Patient disposition: Pt critically ill and to remain in CCU.   Code Status: Full Code     41 minutes of critical care time spent providing medical care for the patient's acute illness and/or conditions that impairs at least one vital organ system and/or poses a high risk of imminent or life threatening deterioration in the patients condition. It includes time spent evaluating and treating the patients acute illness as well as time spent reviewing labs, radiology, discussing goals of care with patient and/or patients family, and discussing the case with a multidisciplinary team in an effort to prevent further life threatening deterioration or end organ damage. This time is independent of any procedures performed.

## 2022-01-21 NOTE — PROGRESS NOTE ADULT - SUBJECTIVE AND OBJECTIVE BOX
Events Overnight: still intubated, fever curve better but still with fevers, LP negative    HPI:      40 y/o F with PMx significant for ESRD, MRSA bacteremia, COPD, Hypertension, ESRD on hemodialysis (MWF) (kidney bx c/w ANCA vasculitis), cocaine and heroin abuse, depression, GERD, epilepsy, Raynaud's syndrome, Rheumatoid arthritis, SLE, seizure disorder (hospitalized 12/15/21 through 12/18/21 for seizure, was discharged on Dilantin), presented to ED via EMS for AMS on 1/18/21. Patient states he went to the store around 11:30 am yesterday and when he came back, he found her laying face up on the floor next to her bed, was unresponsive and foaming from the mouth.    Patient was admitted from ED yesterday for leukocytosis seen on labs, AMS, low dilantin serum levels, +cocaine, THC  on Utox,  CTH on 1/18 was negative  CT chest/Abd pelvis, bilateral perinephric stranding      Patient also with fevers,   Patient overnight became increasingly obtunded with gurgling, ? aspiration and was am 1/20  on vanco, zosyn    ROS cant obtain     MEDICATIONS  (STANDING):  amLODIPine   Tablet 10 milliGRAM(s) Oral daily  artificial  tears Solution 1 Drop(s) Both EYES two times a day  atovaquone  Suspension 1500 milliGRAM(s) Oral daily  chlorhexidine 0.12% Liquid 15 milliLiter(s) Oral Mucosa every 12 hours  cloNIDine Patch 0.1 mG/24Hr(s) 1 patch Transdermal every 7 days  fosphenytoin IVPB 200 milliGRAM(s) PE IV Intermittent <User Schedule>  hydroxychloroquine 200 milliGRAM(s) Oral daily  methylPREDNISolone sodium succinate Injectable 50 milliGRAM(s) IV Push daily  pantoprazole    Tablet 40 milliGRAM(s) Oral before breakfast  piperacillin/tazobactam IVPB.. 3.375 Gram(s) IV Intermittent every 12 hours  propofol Infusion 10 MICROgram(s)/kG/Min (2.86 mL/Hr) IV Continuous <Continuous>  sodium chloride 0.9%. 1000 milliLiter(s) (75 mL/Hr) IV Continuous <Continuous>  vancomycin  IVPB 500 milliGRAM(s) IV Intermittent <User Schedule>    MEDICATIONS  (PRN):  acetaminophen  Suppository .. 650 milliGRAM(s) Rectal every 6 hours PRN Temp greater or equal to 38C (100.4F)  ALBUTerol    90 MICROgram(s) HFA Inhaler 2 Puff(s) Inhalation every 6 hours PRN Shortness of Breath and/or Wheezing  aluminum hydroxide/magnesium hydroxide/simethicone Suspension 30 milliLiter(s) Oral every 4 hours PRN Dyspepsia  LORazepam   Injectable 2 milliGRAM(s) IV Push every 2 hours PRN Agitation  melatonin 3 milliGRAM(s) Oral at bedtime PRN Insomnia  metoprolol tartrate Injectable 2.5 milliGRAM(s) IV Push every 6 hours PRN HR.130 sustained  ondansetron Injectable 4 milliGRAM(s) IV Push every 8 hours PRN Nausea and/or Vomiting  simethicone 80 milliGRAM(s) Chew three times a day PRN Gas      ICU Vital Signs Last 24 Hrs  T(C): 37.2 (21 Jan 2022 08:06), Max: 38.5 (20 Jan 2022 21:00)  T(F): 98.9 (21 Jan 2022 08:06), Max: 101.3 (20 Jan 2022 21:00)  HR: 127 (21 Jan 2022 08:00) (111 - 129)  BP: 120/91 (21 Jan 2022 08:00) (41/15 - 126/84)  BP(mean): 97 (21 Jan 2022 08:00) (22 - 97)  ABP: --  ABP(mean): --  RR: 24 (21 Jan 2022 08:00) (22 - 32)  SpO2: 97% (21 Jan 2022 08:00) (92% - 99%)      Mode: AC/ CMV (Assist Control/ Continuous Mandatory Ventilation)  RR (machine): 24  TV (machine): 390  FiO2: 50  PEEP: 5  ITime: 1  MAP: 11  PIP: 26    Physical Exam    General: ill sweating  HEENT orally intubated,   neck right ij  chest - right chest wall catheter  cv rrr   abdomen -  right sided, wincing with tenderness.  ext old scaring track marks      I&O's Summary    20 Jan 2022 07:01  -  21 Jan 2022 07:00  --------------------------------------------------------  IN: 1498.8 mL / OUT: 2801 mL / NET: -1302.2 mL                        12.1   15.69 )-----------( 350      ( 21 Jan 2022 06:56 )             37.8       01-21    139  |  94<L>  |  67<H>  ----------------------------<  150<H>  4.2   |  30  |  7.97<H>    Ca    8.4<L>      21 Jan 2022 06:56      ABG - ( 20 Jan 2022 08:38 )  pH, Arterial: 7.49  pH, Blood: x     /  pCO2: 33    /  pO2: 51    / HCO3: 25    / Base Excess: 2.2   /  SaO2: 84        DVT Prophylaxis:  Heparin subq                                                               Advanced Directives: Full Code

## 2022-01-21 NOTE — PROGRESS NOTE ADULT - SUBJECTIVE AND OBJECTIVE BOX
* pt seen earlier today     Patient is a 41y Female whom presented to the hospital with AMS   and today AM intubated for airway protection due to worsening MS w fevers   Hx of drug abuse, MRSA bacteremia, COPD, Hypertension, Crescentic ANCA GN leading to  hemodialysis (MWF)  due to cocaine and heroin abuse  urine tox was + for cocaine on this admission   Also recent admit for seizures after non-compliance with sz meds.      HD today with BP issues required IV albumin and only 1 L removed   HR in 120   HD was discontinued 20 min early due to BP and HR      intubated and sedated on vent    PAST MEDICAL & SURGICAL HISTORY:  Rheumatoid arthritis    H/O Raynaud&#x27;s syndrome    Heroin abuse    Smoker    Sepsis    HTN (hypertension)    COPD (chronic obstructive pulmonary disease)    Depression    Epilepsy    GERD (gastroesophageal reflux disease)    Bacteremia    Systemic lupus erythematosus    Aphonia    H/O tubal ligation    H/O appendicitis    Gall bladder stones    H/O tracheostomy    S/P percutaneous endoscopic gastrostomy (PEG) tube placement    MEDICATIONS  (STANDING):  artificial  tears Solution 1 Drop(s) Both EYES two times a day  atovaquone  Suspension 1500 milliGRAM(s) Oral daily  chlorhexidine 0.12% Liquid 15 milliLiter(s) Oral Mucosa every 12 hours  cloNIDine Patch 0.1 mG/24Hr(s) 1 patch Transdermal every 7 days  fosphenytoin IVPB 200 milliGRAM(s) PE IV Intermittent <User Schedule>  hydroxychloroquine 200 milliGRAM(s) Oral daily  levETIRAcetam  IVPB 500 milliGRAM(s) IV Intermittent at bedtime  methylPREDNISolone sodium succinate Injectable 50 milliGRAM(s) IV Push daily  pantoprazole  Injectable 40 milliGRAM(s) IV Push at bedtime  piperacillin/tazobactam IVPB.. 3.375 Gram(s) IV Intermittent every 12 hours  propofol Infusion 10 MICROgram(s)/kG/Min (2.86 mL/Hr) IV Continuous <Continuous>  sodium chloride 0.9%. 1000 milliLiter(s) (75 mL/Hr) IV Continuous <Continuous>  vancomycin  IVPB 500 milliGRAM(s) IV Intermittent <User Schedule>      Allergies    morphine (Rash)    Intolerances        SOCIAL HISTORY:  + drug use      REVIEW OF SYSTEMS:  UTO      Vital Signs Last 24 Hrs  T(C): 37.6 (2022 15:45), Max: 38.3 (2022 23:00)  T(F): 99.6 (2022 15:45), Max: 100.9 (2022 23:00)  HR: 120 (2022 20:20) (104 - 129)  BP: 119/82 (2022 18:00) (74/49 - 121/85)  BP(mean): 91 (2022 18:00) (54 - 102)  RR: 26 (2022 18:00) (11 - 40)  SpO2: 100% (2022 20:20) (70% - 100%)        PHYSICAL EXAM:    Constitutional: frail, >>stated age  HEENT: frail, temp wasting  Neck: No LAD, No JVD  Respiratory: scatt ronchi   S1 and S2, RRR  Gastrointestinal: BS+   Neurological: sedated on vent   marcy cath R        LABS:                                   12.1   15.69 )-----------( 350      ( 2022 06:56 )             37.8                         12.1   17.47 )-----------( 364      ( 2022 07:51 )             39.4           139    |  94     |  67     ----------------------------<  150       2022 06:56  4.2     |  30     |  7.97     134    |  92     |  51     ----------------------------<  161       2022 07:51  4.7     |  25     |  6.37     132    |  93     |  32     ----------------------------<  91        2022 21:27  4.5     |  14     |  4.96     Ca    8.4        2022 06:56  Ca    8.6        2022 07:51      Mg     3.6       2022 14:06    TPro  8.5    /  Alb  2.9    /  TBili  0.5    /        2022 14:06  DBili  x      /  AST  24     /  ALT  19     /  AlkPhos  162            Urine Studies:  Urinalysis Basic - ( 2022 14:06 )    Color: Yellow / Appearance: Slightly Turbid / S.010 / pH: x  Gluc: x / Ketone: Trace  / Bili: Negative / Urobili: Negative   Blood: x / Protein: 500 mg/dL / Nitrite: Negative   Leuk Esterase: Trace / RBC: 25-50 /HPF / WBC 6-10   Sq Epi: x / Non Sq Epi: Occasional / Bacteria: Few            RADIOLOGY & ADDITIONAL STUDIES:

## 2022-01-22 LAB
ANION GAP SERPL CALC-SCNC: 13 MMOL/L — SIGNIFICANT CHANGE UP (ref 5–17)
BASE EXCESS BLDA CALC-SCNC: 2.3 MMOL/L — SIGNIFICANT CHANGE UP (ref -2–3)
BLOOD GAS COMMENTS ARTERIAL: SIGNIFICANT CHANGE UP
BUN SERPL-MCNC: 35 MG/DL — HIGH (ref 7–23)
C DIFF BY PCR RESULT: SIGNIFICANT CHANGE UP
C DIFF TOX GENS STL QL NAA+PROBE: SIGNIFICANT CHANGE UP
CALCIUM SERPL-MCNC: 8.5 MG/DL — SIGNIFICANT CHANGE UP (ref 8.5–10.1)
CHLORIDE SERPL-SCNC: 99 MMOL/L — SIGNIFICANT CHANGE UP (ref 96–108)
CO2 BLDA-SCNC: 24 MMOL/L — SIGNIFICANT CHANGE UP (ref 19–24)
CO2 SERPL-SCNC: 24 MMOL/L — SIGNIFICANT CHANGE UP (ref 22–31)
CREAT SERPL-MCNC: 5.02 MG/DL — HIGH (ref 0.5–1.3)
GAS PNL BLDA: SIGNIFICANT CHANGE UP
GLUCOSE SERPL-MCNC: 87 MG/DL — SIGNIFICANT CHANGE UP (ref 70–99)
HCO3 BLDA-SCNC: 24 MMOL/L — SIGNIFICANT CHANGE UP (ref 21–28)
HCT VFR BLD CALC: 36.1 % — SIGNIFICANT CHANGE UP (ref 34.5–45)
HGB BLD-MCNC: 11.1 G/DL — LOW (ref 11.5–15.5)
MCHC RBC-ENTMCNC: 28.4 PG — SIGNIFICANT CHANGE UP (ref 27–34)
MCHC RBC-ENTMCNC: 30.7 GM/DL — LOW (ref 32–36)
MCV RBC AUTO: 92.3 FL — SIGNIFICANT CHANGE UP (ref 80–100)
PCO2 BLDA: 27 MMHG — LOW (ref 32–35)
PH BLDA: 7.55 — HIGH (ref 7.35–7.45)
PLATELET # BLD AUTO: 253 K/UL — SIGNIFICANT CHANGE UP (ref 150–400)
PO2 BLDA: 134 MMHG — HIGH (ref 83–108)
POTASSIUM SERPL-MCNC: 3.6 MMOL/L — SIGNIFICANT CHANGE UP (ref 3.5–5.3)
POTASSIUM SERPL-SCNC: 3.6 MMOL/L — SIGNIFICANT CHANGE UP (ref 3.5–5.3)
RBC # BLD: 3.91 M/UL — SIGNIFICANT CHANGE UP (ref 3.8–5.2)
RBC # FLD: 14.8 % — HIGH (ref 10.3–14.5)
SAO2 % BLDA: 99 % — SIGNIFICANT CHANGE UP
SODIUM SERPL-SCNC: 136 MMOL/L — SIGNIFICANT CHANGE UP (ref 135–145)
WBC # BLD: 11.42 K/UL — HIGH (ref 3.8–10.5)
WBC # FLD AUTO: 11.42 K/UL — HIGH (ref 3.8–10.5)

## 2022-01-22 PROCEDURE — 99291 CRITICAL CARE FIRST HOUR: CPT

## 2022-01-22 PROCEDURE — 99233 SBSQ HOSP IP/OBS HIGH 50: CPT

## 2022-01-22 RX ORDER — METOCLOPRAMIDE HCL 10 MG
10 TABLET ORAL EVERY 6 HOURS
Refills: 0 | Status: DISCONTINUED | OUTPATIENT
Start: 2022-01-22 | End: 2022-01-26

## 2022-01-22 RX ORDER — PHENYLEPHRINE HYDROCHLORIDE 10 MG/ML
0.1 INJECTION INTRAVENOUS
Qty: 40 | Refills: 0 | Status: DISCONTINUED | OUTPATIENT
Start: 2022-01-22 | End: 2022-01-22

## 2022-01-22 RX ORDER — MIDAZOLAM HYDROCHLORIDE 1 MG/ML
2 INJECTION, SOLUTION INTRAMUSCULAR; INTRAVENOUS ONCE
Refills: 0 | Status: DISCONTINUED | OUTPATIENT
Start: 2022-01-22 | End: 2022-01-22

## 2022-01-22 RX ORDER — HYDRALAZINE HCL 50 MG
10 TABLET ORAL EVERY 8 HOURS
Refills: 0 | Status: DISCONTINUED | OUTPATIENT
Start: 2022-01-22 | End: 2022-01-23

## 2022-01-22 RX ORDER — HYDRALAZINE HCL 50 MG
10 TABLET ORAL EVERY 6 HOURS
Refills: 0 | Status: DISCONTINUED | OUTPATIENT
Start: 2022-01-22 | End: 2022-02-02

## 2022-01-22 RX ORDER — VANCOMYCIN HCL 1 G
125 VIAL (EA) INTRAVENOUS EVERY 6 HOURS
Refills: 0 | Status: DISCONTINUED | OUTPATIENT
Start: 2022-01-22 | End: 2022-01-26

## 2022-01-22 RX ADMIN — Medication 1 DROP(S): at 11:09

## 2022-01-22 RX ADMIN — Medication 10 MILLIGRAM(S): at 23:50

## 2022-01-22 RX ADMIN — Medication 10 MILLIGRAM(S): at 13:58

## 2022-01-22 RX ADMIN — Medication 10 MILLIGRAM(S): at 17:16

## 2022-01-22 RX ADMIN — HEPARIN SODIUM 5000 UNIT(S): 5000 INJECTION INTRAVENOUS; SUBCUTANEOUS at 11:09

## 2022-01-22 RX ADMIN — Medication 650 MILLIGRAM(S): at 16:30

## 2022-01-22 RX ADMIN — Medication 200 MILLIGRAM(S): at 11:06

## 2022-01-22 RX ADMIN — Medication 10 MILLIGRAM(S): at 22:02

## 2022-01-22 RX ADMIN — Medication 2 MILLIGRAM(S): at 14:24

## 2022-01-22 RX ADMIN — MIDAZOLAM HYDROCHLORIDE 2 MILLIGRAM(S): 1 INJECTION, SOLUTION INTRAMUSCULAR; INTRAVENOUS at 17:32

## 2022-01-22 RX ADMIN — Medication 2 MILLIGRAM(S): at 18:42

## 2022-01-22 RX ADMIN — Medication 10 MILLIGRAM(S): at 11:09

## 2022-01-22 RX ADMIN — CHLORHEXIDINE GLUCONATE 15 MILLILITER(S): 213 SOLUTION TOPICAL at 22:01

## 2022-01-22 RX ADMIN — HEPARIN SODIUM 5000 UNIT(S): 5000 INJECTION INTRAVENOUS; SUBCUTANEOUS at 22:02

## 2022-01-22 RX ADMIN — Medication 1 DROP(S): at 22:01

## 2022-01-22 RX ADMIN — PIPERACILLIN AND TAZOBACTAM 25 GRAM(S): 4; .5 INJECTION, POWDER, LYOPHILIZED, FOR SOLUTION INTRAVENOUS at 22:04

## 2022-01-22 RX ADMIN — PROPOFOL 2.86 MICROGRAM(S)/KG/MIN: 10 INJECTION, EMULSION INTRAVENOUS at 18:20

## 2022-01-22 RX ADMIN — LEVETIRACETAM 400 MILLIGRAM(S): 250 TABLET, FILM COATED ORAL at 22:01

## 2022-01-22 RX ADMIN — Medication 10 MILLIGRAM(S): at 17:18

## 2022-01-22 RX ADMIN — PIPERACILLIN AND TAZOBACTAM 25 GRAM(S): 4; .5 INJECTION, POWDER, LYOPHILIZED, FOR SOLUTION INTRAVENOUS at 12:07

## 2022-01-22 RX ADMIN — CHLORHEXIDINE GLUCONATE 15 MILLILITER(S): 213 SOLUTION TOPICAL at 11:06

## 2022-01-22 RX ADMIN — PANTOPRAZOLE SODIUM 40 MILLIGRAM(S): 20 TABLET, DELAYED RELEASE ORAL at 22:04

## 2022-01-22 RX ADMIN — Medication 125 MILLIGRAM(S): at 11:06

## 2022-01-22 RX ADMIN — PROPOFOL 2.86 MICROGRAM(S)/KG/MIN: 10 INJECTION, EMULSION INTRAVENOUS at 00:21

## 2022-01-22 RX ADMIN — Medication 125 MILLIGRAM(S): at 23:58

## 2022-01-22 RX ADMIN — ATOVAQUONE 1500 MILLIGRAM(S): 750 SUSPENSION ORAL at 11:06

## 2022-01-22 RX ADMIN — PROPOFOL 2.86 MICROGRAM(S)/KG/MIN: 10 INJECTION, EMULSION INTRAVENOUS at 14:03

## 2022-01-22 RX ADMIN — Medication 50 MILLIGRAM(S): at 05:19

## 2022-01-22 RX ADMIN — Medication 125 MILLIGRAM(S): at 17:16

## 2022-01-22 RX ADMIN — PIPERACILLIN AND TAZOBACTAM 25 GRAM(S): 4; .5 INJECTION, POWDER, LYOPHILIZED, FOR SOLUTION INTRAVENOUS at 01:44

## 2022-01-22 RX ADMIN — Medication 2.5 MILLIGRAM(S): at 20:12

## 2022-01-22 RX ADMIN — DEXMEDETOMIDINE HYDROCHLORIDE IN 0.9% SODIUM CHLORIDE 2.38 MICROGRAM(S)/KG/HR: 4 INJECTION INTRAVENOUS at 00:17

## 2022-01-22 RX ADMIN — FOSPHENYTOIN 108 MILLIGRAM(S) PE: 50 INJECTION INTRAMUSCULAR; INTRAVENOUS at 22:01

## 2022-01-22 NOTE — PROGRESS NOTE ADULT - SUBJECTIVE AND OBJECTIVE BOX
Events Overnight: on vent, ct abd yesterday, lll infiltrate, colitis, still high NGT output, some diarrhea    HPI:  40 y/o F with PMx significant for ESRD, MRSA bacteremia, COPD, Hypertension, ESRD on hemodialysis (MWF) (kidney bx c/w ANCA vasculitis), cocaine and heroin abuse, depression, , epilepsy, Raynaud's syndrome, Rheumatoid arthritis, SLE, seizure disorder (hospitalized 12/15/21 through 12/18/21 for seizure, was discharged on Dilantin), presented to ED via EMS for AMS on 1/18/21.   Patient was admitted from ED   for leukocytosis seen on labs, AMS, low dilantin serum levels, +cocaine, THC  on Utox,  CTH on 1/18 was negative  Echo ef 40 %.  CT chest/Abd pelvis, colitis, lll infitlrate, RP lymphadenopathy  fever curves negative  negative LP  ? aspiration and was intubated am 1/20  on vanco, zosyn    ROS cant obtain     MEDICATIONS  (STANDING):  artificial  tears Solution 1 Drop(s) Both EYES two times a day  atovaquone  Suspension 1500 milliGRAM(s) Oral daily  chlorhexidine 0.12% Liquid 15 milliLiter(s) Oral Mucosa every 12 hours  dexMEDEtomidine Infusion 0.2 MICROgram(s)/kG/Hr (2.38 mL/Hr) IV Continuous <Continuous>  fosphenytoin IVPB 200 milliGRAM(s) PE IV Intermittent <User Schedule>  heparin   Injectable 5000 Unit(s) SubCutaneous every 12 hours  hydroxychloroquine 200 milliGRAM(s) Oral daily  levETIRAcetam  IVPB 500 milliGRAM(s) IV Intermittent at bedtime  methylPREDNISolone sodium succinate Injectable 50 milliGRAM(s) IV Push daily  metoclopramide Injectable 10 milliGRAM(s) IV Push every 6 hours  pantoprazole  Injectable 40 milliGRAM(s) IV Push at bedtime  piperacillin/tazobactam IVPB.. 3.375 Gram(s) IV Intermittent every 12 hours  propofol Infusion 10 MICROgram(s)/kG/Min (2.86 mL/Hr) IV Continuous <Continuous>  sodium chloride 0.9%. 1000 milliLiter(s) (75 mL/Hr) IV Continuous <Continuous>  vancomycin    Solution 125 milliGRAM(s) Oral every 6 hours  vancomycin  IVPB 500 milliGRAM(s) IV Intermittent <User Schedule>    MEDICATIONS  (PRN):  acetaminophen  Suppository .. 650 milliGRAM(s) Rectal every 6 hours PRN Temp greater or equal to 38C (100.4F)  albumin human 25% IVPB 50 milliLiter(s) IV Intermittent every 1 hour PRN SBP less than 100  ALBUTerol    90 MICROgram(s) HFA Inhaler 2 Puff(s) Inhalation every 6 hours PRN Shortness of Breath and/or Wheezing  aluminum hydroxide/magnesium hydroxide/simethicone Suspension 30 milliLiter(s) Oral every 4 hours PRN Dyspepsia  levETIRAcetam  IVPB 250 milliGRAM(s) IV Intermittent daily PRN After dialysis  LORazepam   Injectable 2 milliGRAM(s) IV Push every 2 hours PRN Agitation  melatonin 3 milliGRAM(s) Oral at bedtime PRN Insomnia  metoprolol tartrate Injectable 2.5 milliGRAM(s) IV Push every 6 hours PRN HR.130 sustained  ondansetron Injectable 4 milliGRAM(s) IV Push every 8 hours PRN Nausea and/or Vomiting  simethicone 80 milliGRAM(s) Chew three times a day PRN Gas      ICU Vital Signs Last 24 Hrs  T(C): 37.2 (22 Jan 2022 06:10), Max: 37.6 (21 Jan 2022 12:09)  T(F): 98.9 (22 Jan 2022 06:10), Max: 99.7 (21 Jan 2022 12:09)  HR: 102 (22 Jan 2022 08:23) (94 - 127)  BP: 126/92 (22 Jan 2022 08:00) (74/49 - 139/90)  BP(mean): 99 (22 Jan 2022 08:00) (54 - 107)  ABP: --  ABP(mean): --  RR: 16 (22 Jan 2022 08:00) (11 - 40)  SpO2: 100% (22 Jan 2022 08:23) (70% - 100%)      Mode: AC/ CMV (Assist Control/ Continuous Mandatory Ventilation)  RR (machine): 24  TV (machine): 390  FiO2: 50  PEEP: 5  PS: 5  ITime: 60  MAP: 10  PIP: 23    Physical Exam    General: ill sweating  HEENT orally intubated,   neck right ij  chest - right chest wall catheter  cv rrr   abdomen -  right sided, wincing with tenderness.  ext old scaring track marks      I&O's Summary    21 Jan 2022 07:01  -  22 Jan 2022 07:00  --------------------------------------------------------  IN: 2558.7 mL / OUT: 300 mL / NET: 2258.7 mL                          11.1   11.42 )-----------( 253      ( 22 Jan 2022 06:02 )             36.1       01-22    136  |  99  |  35<H>  ----------------------------<  87  3.6   |  24  |  5.02<H>    Ca    8.5      22 Jan 2022 06:02      ABG - ( 22 Jan 2022 05:42 )  pH, Arterial: 7.55  pH, Blood: x     /  pCO2: 27    /  pO2: 134   / HCO3: 24    / Base Excess: 2.3   /  SaO2: 99        DVT Prophylaxis:   Heparin subq                                                              Advanced Directives: Full COde

## 2022-01-22 NOTE — PROGRESS NOTE ADULT - ASSESSMENT
ASSESSMENT:  BRIEF HOSPITAL COURSE:  The patient a  40 y/o F PMx significant for MRSA bacteremia, COPD, Hypertension, ESRD on hemodialysis (MWF) (kidney bx c/w ANCA vasculitis), cocaine and heroin abuse, depression, GERD, epilepsy, Raynaud's syndrome, Rheumatoid arthritis, SLE, seizure disorder, hospitalized 12/15/21 through 12/18/21 for seizure, presented in the ER via EMS  for AMS on 1/18/22 after being found unresponsive at home. In the ED, she was only reactive to painful stimuli. CT head was negative. AMS likely secondary to drug use vs sepsis. Cultures drawn and broad spectrum antibiotics were started. Patient's utox was positive for THC and cocaine. Trops were also elevated to 286. Cardiology signed off as they ruled the troponin to be 2/2 demand injury from cocaine use.  Imaging was remarkable for new subtle compression deformities of superior t5/t6 endplates, RUL bronchiolitis, LLL atelectasis, UA with pyuria, b/l perinephric stranding noted on CT. Meropenem was added to the vanc and zosyn for PCP prophylaxis. On 1/20 patient became hypoxic and was gurgling secretions, so she was intubated. She became hypotensive and required pressors. Neurology recommended continuing seizure prophylaxis, as it is unclear whether the patient has a seizure disorder.    Problems:  -Sepsis vs encephalopathy (AMS)  -Seizure disorder  -Drug use  -Pyelonephritis   -ESRD   -Lumbar compression fractures   -Demand ischemia   -Acute hypoxic resp failure     PLAN:    NEURO:  -Neurology following  -Unclear if patient has seizure history   -Continue antiepileptics (Keppra and Fosphenytoin)  -Wean propofol as able   -Unable to assess mental status as patient is intubated and sedation  -CT head was negative on 1/18  -LP performed and was unremarkable.     RESPIRATORY:   -Intubated for acute hypoxic respiratory failure  -Actively weaning ventilator settings as able   -CT chest shows RUL bronchiolitis, LLL atelectasis. Pt on broad spectrum abx.  -Respiratory alkalosis. Resp rate on vent decreased.    CARDIOVASCULAR:  -Sinus tachycardia likely secondary to infection. May also be agitation as we are weaning Propofol.  -Trops peaked. Cardiology states likely due to demand ischemia.   -Off pressors at this time. Keep MAP >65.  -Patient becomes intermittently hypertensive. PRN Hydralazine 10 and metoprolol 2.5 IV ordered.  -Plaquenil and Atovaquone for vasculitis.     GI/NUTRITION:  -Tube feeds  -CT abdomen/pelvis to rule out obstruction. No obstruction. colitis.  -Rule out c diff. PO Vanco started.    GENITOURINARY/RENAL:  -Pyelonephritis. ID following.  -ESRD. Nephrology following. Dialysis MWF. Dialyzed 1/21.  -Creatinine improved from 7 to 5. Anuric.     HEMATOLOGIC:  -Heparin SQ for DVT prophylaxis  -H&H stable. Continue to trend.    INFECTIOUS DISEASE:  -Sepsis.   -On Vanco, Zosyn, Meropenem.  -LP unremarkable  -Neg blood cultures and urine culture.  -Colitis on CT A/P. Rule out Cdif. Empiric Vanco started.   -CT shows LLL infiltrate.     ENDOCRINE:  -Continue steroids. Wean when able.     Patient disposition: Pt critically ill and to remain in CCU.   Code Status: Full Code     37 minutes of critical care time spent providing medical care for the patient's acute illness and/or conditions that impairs at least one vital organ system and/or poses a high risk of imminent or life threatening deterioration in the patients condition. It includes time spent evaluating and treating the patients acute illness as well as time spent reviewing labs, radiology, discussing goals of care with patient and/or patients family, and discussing the case with a multidisciplinary team in an effort to prevent further life threatening deterioration or end organ damage. This time is independent of any procedures performed.

## 2022-01-22 NOTE — PROGRESS NOTE ADULT - SUBJECTIVE AND OBJECTIVE BOX
BRIEF HOSPITAL COURSE:  The patient a  40 y/o F PMx significant for MRSA bacteremia, COPD, Hypertension, ESRD on hemodialysis (MWF) (kidney bx c/w ANCA vasculitis), cocaine and heroin abuse, depression, GERD, epilepsy, Raynaud's syndrome, Rheumatoid arthritis, SLE, seizure disorder, hospitalized 12/15/21 through 12/18/21 for seizure, presented in the ER via EMS  for AMS on 1/18/22 after being found unresponsive at home. In the ED, she was only reactive to painful stimuli. CT head was negative. AMS likely secondary to drug use vs sepsis. Cultures drawn and broad spectrum antibiotics were started. Patient's utox was positive for THC and cocaine. Trops were also elevated to 286. Cardiology signed off as they ruled the troponin to be 2/2 demand injury from cocaine use.  Imaging was remarkable for new subtle compression deformities of superior t5/t6 endplates, RUL bronchiolitis, LLL atelectasis, UA with pyuria, b/l perinephric stranding noted on CT. Meropenem was added to the vanc and zosyn for PCP prophylaxis. On 1/20 patient became hypoxic and was gurgling secretions, so she was intubated. She became hypotensive and required pressors. Neurology recommended continuing seizure prophylaxis, as it is unclear whether the patient has a seizure disorder.    Events last 24 hours: Patient has intermittent HTN requiring PRN Hydralazine and BB. Vent settings improved. Continues to be difficult to sedate. Coffee ground emesis also noted, which has been going on for 2 days now. CT came back revealing colitis, LLL infiltrate and retroperitoneal LAD. Cr improved after dialysis. Started on PO Vanco to r/o Cdiff.    More awake today, even on propofol at 60mcg/hr.      PAST MEDICAL & SURGICAL HISTORY:  Rheumatoid arthritis    H/O Raynaud&#x27;s syndrome    Heroin abuse    Smoker    Sepsis    HTN (hypertension)    COPD (chronic obstructive pulmonary disease)    Depression    Epilepsy    GERD (gastroesophageal reflux disease)    Bacteremia    Systemic lupus erythematosus    Aphonia    H/O tubal ligation    H/O appendicitis    Gall bladder stones    H/O tracheostomy    S/P percutaneous endoscopic gastrostomy (PEG) tube placement      Allergies    morphine (Rash)    Intolerances      FAMILY HISTORY:  Family history of cardiomyopathy (Mother)        Social History: Drug use     Review of Systems:  Unable to obtain      Vitals During Exam:   HR: 120s  BP: 130/96  RR: 12  sPO2: 100%    Physical Examination:    General: Intubated. More arousable today.     HEENT: Pupils equal, reactive to light.  Symmetric.    PULM: Clear to auscultation bilaterally, no significant sputum production    CVS: Tachycardic. Regular rhythm, no murmurs, rubs, or gallops    ABD: Soft, nondistended, nontender, normoactive bowel sounds, no masses. Coffee ground emesis.    EXT: No edema, nontender    SKIN: Warm and well perfused, no rashes noted.    NEURO: Intubated and sedated          Medications:  atovaquone  Suspension 1500 milliGRAM(s) Oral daily  hydroxychloroquine 200 milliGRAM(s) Oral daily  piperacillin/tazobactam IVPB.. 3.375 Gram(s) IV Intermittent every 12 hours  vancomycin    Solution 125 milliGRAM(s) Oral every 6 hours  vancomycin  IVPB 500 milliGRAM(s) IV Intermittent <User Schedule>    hydrALAZINE 10 milliGRAM(s) Oral every 8 hours  hydrALAZINE Injectable 10 milliGRAM(s) IV Push every 6 hours PRN  metoprolol tartrate Injectable 2.5 milliGRAM(s) IV Push every 6 hours PRN    ALBUTerol    90 MICROgram(s) HFA Inhaler 2 Puff(s) Inhalation every 6 hours PRN    acetaminophen  Suppository .. 650 milliGRAM(s) Rectal every 6 hours PRN  dexMEDEtomidine Infusion 0.2 MICROgram(s)/kG/Hr IV Continuous <Continuous>  fosphenytoin IVPB 200 milliGRAM(s) PE IV Intermittent <User Schedule>  levETIRAcetam  IVPB 500 milliGRAM(s) IV Intermittent at bedtime  levETIRAcetam  IVPB 250 milliGRAM(s) IV Intermittent daily PRN  LORazepam   Injectable 2 milliGRAM(s) IV Push every 2 hours PRN  melatonin 3 milliGRAM(s) Oral at bedtime PRN  metoclopramide Injectable 10 milliGRAM(s) IV Push every 6 hours  ondansetron Injectable 4 milliGRAM(s) IV Push every 8 hours PRN  propofol Infusion 10 MICROgram(s)/kG/Min IV Continuous <Continuous>      heparin   Injectable 5000 Unit(s) SubCutaneous every 12 hours    aluminum hydroxide/magnesium hydroxide/simethicone Suspension 30 milliLiter(s) Oral every 4 hours PRN  pantoprazole  Injectable 40 milliGRAM(s) IV Push at bedtime  simethicone 80 milliGRAM(s) Chew three times a day PRN      methylPREDNISolone sodium succinate Injectable 50 milliGRAM(s) IV Push daily    albumin human 25% IVPB 50 milliLiter(s) IV Intermittent every 1 hour PRN  sodium chloride 0.9%. 1000 milliLiter(s) IV Continuous <Continuous>      artificial  tears Solution 1 Drop(s) Both EYES two times a day  chlorhexidine 0.12% Liquid 15 milliLiter(s) Oral Mucosa every 12 hours        Mode: AC/ CMV (Assist Control/ Continuous Mandatory Ventilation)  RR (machine): 12  TV (machine): 390  FiO2: 40  PEEP: 5  PS: 5  MAP: 14  PIP: 24      ICU Vital Signs Last 24 Hrs  T(C): 37.8 (22 Jan 2022 16:31), Max: 37.8 (22 Jan 2022 16:31)  T(F): 100.1 (22 Jan 2022 16:31), Max: 100.1 (22 Jan 2022 16:31)  HR: 111 (22 Jan 2022 20:31) (94 - 130)  BP: 130/96 (22 Jan 2022 18:00) (76/53 - 167/115)  BP(mean): 103 (22 Jan 2022 18:00) (59 - 127)  ABP: --  ABP(mean): --  RR: 37 (22 Jan 2022 18:00) (12 - 37)  SpO2: 95% (22 Jan 2022 20:31) (85% - 100%)    Vital Signs Last 24 Hrs  T(C): 37.8 (22 Jan 2022 16:31), Max: 37.8 (22 Jan 2022 16:31)  T(F): 100.1 (22 Jan 2022 16:31), Max: 100.1 (22 Jan 2022 16:31)  HR: 111 (22 Jan 2022 20:31) (94 - 130)  BP: 130/96 (22 Jan 2022 18:00) (76/53 - 167/115)  BP(mean): 103 (22 Jan 2022 18:00) (59 - 127)  RR: 37 (22 Jan 2022 18:00) (12 - 37)  SpO2: 95% (22 Jan 2022 20:31) (85% - 100%)    ABG - ( 22 Jan 2022 05:42 )  pH, Arterial: 7.55  pH, Blood: x     /  pCO2: 27    /  pO2: 134   / HCO3: 24    / Base Excess: 2.3   /  SaO2: 99                  I&O's Detail    21 Jan 2022 07:01  -  22 Jan 2022 07:00  --------------------------------------------------------  IN:    Dexmedetomidine: 20.7 mL    IV PiggyBack: 525 mL    Other (mL): 900 mL    Propofol: 344 mL    sodium chloride 0.9%: 1669 mL  Total IN: 3458.7 mL    OUT:    Gastric Aspirate - Discarded (mL): 300 mL    Other (mL): 1700 mL    Voided (mL): 0 mL  Total OUT: 2000 mL    Total NET: 1458.7 mL      22 Jan 2022 07:01  -  22 Jan 2022 21:22  --------------------------------------------------------  IN:    IV PiggyBack: 100 mL    Propofol: 161 mL    sodium chloride 0.9%: 516 mL  Total IN: 777 mL    OUT:    Gastric Aspirate - Discarded (mL): 350 mL  Total OUT: 350 mL    Total NET: 427 mL            LABS:                        11.1   11.42 )-----------( 253      ( 22 Jan 2022 06:02 )             36.1     01-22    136  |  99  |  35<H>  ----------------------------<  87  3.6   |  24  |  5.02<H>    Ca    8.5      22 Jan 2022 06:02            CAPILLARY BLOOD GLUCOSE            CULTURES:  C Diff by PCR Result: Moses (01-21 @ 17:00)  Culture Results:   Culture is being performed. (01-20 @ 14:32)  Culture Results:   No growth (01-20 @ 14:32)  Culture Results:   Testing in progress (01-20 @ 14:32)  Culture Results:   No growth to date. (01-18 @ 17:17)  Culture Results:   <10,000 CFU/mL Normal Urogenital Dania (01-18 @ 14:06)  Culture Results:   No growth to date. (01-18 @ 14:06)        RADIOLOGY: < from: CT Abdomen and Pelvis w/ Oral Cont (01.21.22 @ 16:39) >    ACC: [id number] EXAM:  CT ABDOMEN AND PELVIS OC                          PROCEDURE DATE:  01/21/2022          INTERPRETATION:  PROCEDURE INFORMATION:  Exam: CT Abdomen And Pelvis Without Contrast  Exam date and time: 1/21/2022 4:35 PM  Age: 41 years old  Clinical indication: Abdominal pain, sepsis, high residuals from NG tube    TECHNIQUE:  Imaging protocol: Computed tomography of the abdomen and pelvis without  contrast.    COMPARISON:  CT ABDOMEN AND PELVIS 1/18/2022 3:34 PM    FINDINGS:  Tubes, catheters and devices: NG tube tip is in the lumen of the mid   stomach.  There is an incompletely visualized catheter extending from superiorly   into the  intrahepatic IVC.    Lungs: Incompletely visualized near complete atelectasis of the left lower  lobe. Concomitant pneumonia or aspiration not excluded. This finding   raises the  possibility of a more proximal obstructing left lower lobe airway lesion.  Consider CT chest. Mild bronchiolitis in the dependent right lower lobe   could  be secondary to infection or aspiration.    Liver: Normal. No mass.  Gallbladder and bile ducts: Prior cholecystectomy. No biliary ductal  dilatation.  Pancreas: Unremarkable.  Spleen: Normal.  Adrenal glands: Normal. No mass.  Kidneys and ureters: No hydronephrosis.    Stomach and bowel: There is equivocal mild thickening of the wall of the  sigmoid colon which raises the possibility of colitis. There is short   segment left mid abdominal small bowel intussusception without proximal   obstruction, likely transient.  Appendix: Appendix not visualized. No evidence of appendicitis.    Intraperitoneal space: No pneumoperitoneum. No abscess. No  inflammation.  Vasculature: Unremarkable.  Lymph nodes: There are confluent nodular structures in the left   retroperitoneum  and left iliac chain which is presumably lymphadenopathy measuring up to   1.5 cm  in short axis; however, on the noncontrast study, large caliber   vessel/varices  are not excluded.    Urinary bladder: Unremarkable as visualized.  Reproductive: Unremarkable as visualized.    Bones/joints: Right hip osteoarthritis. Right hip joint effusion versus  bursitis. (Images 102-105 of axial series 2).  Soft tissues: Superficial sacral decubitus ulcer.    IMPRESSION:  1. There is equivocal mild thickening of the wall of the sigmoid colon   which  raises the possibility of colitis. No bowel obstruction.  2. There are confluent nodular structures in the left retroperitoneum and   left  iliac chain which is presumably lymphadenopathy measuring up to 1.5 cm in   short  axis; however, on the noncontrast study, large caliber vessel/varices are   not  excluded.  3. Incompletely visualized near complete atelectasis of the left lower   lobe.  Concomitant pneumonia or aspiration not excluded. This finding raises the  possibility of a more proximal obstructing left lower lobe airway lesion.  Consider CT chest.  4. Mild bronchiolitis in the dependent right lower lobe could be   secondary to  infection or aspiration.  5. Superficial sacral decubitus ulcer.  6. Right hip joint effusion versus bursitis. (Images 102-105 of axial   series  2).    --- End of Report ---            DAWIT PATEL MD; Attending Radiologist  This document has been electronically signed. Jan 22 2022 12:34PM    < end of copied text >

## 2022-01-22 NOTE — PROGRESS NOTE ADULT - ASSESSMENT
Patient with history ESRD on dialysis  with altered mental status,   fevers  likely aspiration pneumonia  Acute respiratory failure  seizures  r/o sepsis    1. now intubated, will check sputum culture, minimal infiltrate on cxr, LP negative      ct does show dense lll infitlrae on vanco . zosyn      hx. c. dif diarrhea, send repeat, start PO vanco  2. seizures on dilantin,    3. on vanco, zosyn, 4 mepron,  3, now po vanco day 1  5. plaquenil for vasulitis, chronic steroids  6.  Dialysis yesterday  7. high ng residuals will start Reglanl    Respiratory Alkalosis, dec rate on vent Patient with history ESRD on dialysis  with altered mental status,   fevers  likely aspiration pneumonia  Acute respiratory failure  seizures  r/o sepsis    1. now intubated, will check sputum culture, minimal infiltrate on cxr, LP negative      ct does show dense lll infitlrae on vanco . zosyn      hx. c. dif diarrhea, send repeat, start PO vanco  2. seizures on dilantin,    3. on vanco, zosyn, 4 mepron,  3, now po vanco day 1  5. plaquenil for vasulitis, chronic steroids  6.  Dialysis yesterday  7. high ng residuals will start Reglanl    D/w Patients , felice Mane, who states they are still  and he is decision maker Phone number [phone]    Respiratory Alkalosis, dec rate on vent

## 2022-01-22 NOTE — PROGRESS NOTE ADULT - SUBJECTIVE AND OBJECTIVE BOX
REASON FOR VISIT: Elevated Troponin    40 y/o F PMx significant for MRSA bacteremia, COPD, Hypertension, ESRD on hemodialysis (MWF) (kidney bx c/w ANCA vasculitis), cocaine and heroin abuse, depression, GERD, epilepsy, Raynaud's syndrome, Rheumatoid arthritis, SLE, seizure disorder, hospitalized 12/15/21 through 12/18/21 for seizure, presented in the ER on 01/18 via EMS  for AMS.     -Pt w leukocytosis, sinus tachycardia, high sensitivity trop of 286, elevated BNP, + cocaine and THC on Utox. Cardiology consulted for further evaluation of elevated trop/BNP.     01/20: Overnight events noted, patient became hypoxic despite ventimask, gurgling secretions w elevated RR, obtunded. Patient was transferred to CCU and intubated. Became hypotensive before intubation requiring pressors, currently off pressors. Patient remains in CCU, intubated and sedated, tachy in the 120's at bedside, BP 97/72.   1/21/22  patient is remain intubated , off of pressors , on o2 45%, febrile episodes   1/22/22 patient intubated , sedated         MEDICATIONS  (STANDING):  artificial  tears Solution 1 Drop(s) Both EYES two times a day  atovaquone  Suspension 1500 milliGRAM(s) Oral daily  chlorhexidine 0.12% Liquid 15 milliLiter(s) Oral Mucosa every 12 hours  dexMEDEtomidine Infusion 0.2 MICROgram(s)/kG/Hr (2.38 mL/Hr) IV Continuous <Continuous>  fosphenytoin IVPB 200 milliGRAM(s) PE IV Intermittent <User Schedule>  heparin   Injectable 5000 Unit(s) SubCutaneous every 12 hours  hydroxychloroquine 200 milliGRAM(s) Oral daily  levETIRAcetam  IVPB 500 milliGRAM(s) IV Intermittent at bedtime  methylPREDNISolone sodium succinate Injectable 50 milliGRAM(s) IV Push daily  metoclopramide Injectable 10 milliGRAM(s) IV Push every 6 hours  pantoprazole  Injectable 40 milliGRAM(s) IV Push at bedtime  piperacillin/tazobactam IVPB.. 3.375 Gram(s) IV Intermittent every 12 hours  propofol Infusion 10 MICROgram(s)/kG/Min (2.86 mL/Hr) IV Continuous <Continuous>  sodium chloride 0.9%. 1000 milliLiter(s) (75 mL/Hr) IV Continuous <Continuous>  vancomycin    Solution 125 milliGRAM(s) Oral every 6 hours  vancomycin  IVPB 500 milliGRAM(s) IV Intermittent <User Schedule>    MEDICATIONS  (PRN):  acetaminophen  Suppository .. 650 milliGRAM(s) Rectal every 6 hours PRN Temp greater or equal to 38C (100.4F)  albumin human 25% IVPB 50 milliLiter(s) IV Intermittent every 1 hour PRN SBP less than 100  ALBUTerol    90 MICROgram(s) HFA Inhaler 2 Puff(s) Inhalation every 6 hours PRN Shortness of Breath and/or Wheezing  aluminum hydroxide/magnesium hydroxide/simethicone Suspension 30 milliLiter(s) Oral every 4 hours PRN Dyspepsia  levETIRAcetam  IVPB 250 milliGRAM(s) IV Intermittent daily PRN After dialysis  LORazepam   Injectable 2 milliGRAM(s) IV Push every 2 hours PRN Agitation  melatonin 3 milliGRAM(s) Oral at bedtime PRN Insomnia  metoprolol tartrate Injectable 2.5 milliGRAM(s) IV Push every 6 hours PRN HR.130 sustained  ondansetron Injectable 4 milliGRAM(s) IV Push every 8 hours PRN Nausea and/or Vomiting  simethicone 80 milliGRAM(s) Chew three times a day PRN Gas      Vital Signs Last 24 Hrs  T(C): 37.2 (22 Jan 2022 06:10), Max: 37.6 (21 Jan 2022 12:09)  T(F): 98.9 (22 Jan 2022 06:10), Max: 99.7 (21 Jan 2022 12:09)  HR: 100 (22 Jan 2022 09:00) (94 - 124)  BP: 126/97 (22 Jan 2022 09:00) (74/49 - 139/90)  BP(mean): 103 (22 Jan 2022 09:00) (54 - 107)  RR: 12 (22 Jan 2022 09:00) (11 - 40)  SpO2: 99% (22 Jan 2022 09:00) (70% - 100%)    I&O's Summary    21 Jan 2022 07:01  -  22 Jan 2022 07:00  --------------------------------------------------------  IN: 2558.7 mL / OUT: 300 mL / NET: 2258.7 mL          Physical Exam   Gen: sedated  vented   CV: sinus tachycardia, s1/s2, no M/R/G  Pulm: crackles resolving, currently intubated   Abd: normoactive bowel sounds in all 4 quadrants, soft, nontender, nondistended, no rebound, no guarding, no masses  Extremities: warm, no pedal edema, pedal pulses palpable, LLE contraction, uses wheelchair to ambulate previously  Skin: nl warm and dry, no wounds   Neuro: sedated       Labs                                    11.1   11.42 )-----------( 253      ( 22 Jan 2022 06:02 )             36.1     01-22    136  |  99  |  35<H>  ----------------------------<  87  3.6   |  24  |  5.02<H>    Ca    8.5      22 Jan 2022 06:02          Culture Results:   No growth (01-20-22 @ 14:32)  Culture Results:   Testing in progress (01-20-22 @ 14:32)  Culture Results:   No growth to date. (01-18-22 @ 17:17)  Culture Results:   <10,000 CFU/mL Normal Urogenital Dania (01-18-22 @ 14:06)  Culture Results:   No growth to date. (01-18-22 @ 14:06)      < from: Xray Chest 1 View- PORTABLE-Urgent (Xray Chest 1 View- PORTABLE-Urgent .) (01.18.22 @ 15:13) >  IMPRESSION: Large right jugular line. Otherwise unremarkable study.    < end of copied text >      < from: CT Head No Cont (01.18.22 @ 15:44) >  IMPRESSION: No evidence of acute hemorrhage mass mass effect in posterior   fossa or supratentorial region.    No fracture or dislocation involving the cervical spine.    < end of copied text >    < from: CT Chest No Cont (01.18.22 @ 15:52) >  IMPRESSION: New subtle compression deformities of the superior T5 and T6   endplates. No evidence of visceral organ injury.      < end of copied text >    < from: TTE Echo Complete w/o Contrast w/ Doppler (01.19.22 @ 08:38) >   Summary     The left ventricle is normal in size.   Moderately reduced left ventricular systolic function.   Estimated left ventricular ejection fraction is 40-45 %.   Overall LV dysfunction appears global.   Normal appearing right ventricle structure and function.   Normal appearing mitral valve structure and function.   Mild (1+) mitral regurgitation is present.   Normal appearing tricuspid valve structure and function.   Trace tricuspid valve regurgitation is present.   No evidence of pericardial effusion.     Signature     ----------------------------------------------------------------   Electronically signed by Jamie Pastrana DO(Interpreting   physician) on 01/19/2022 10:04 AM    < end of copied text >        monitor sinus tachycardia  episodes

## 2022-01-22 NOTE — PROGRESS NOTE ADULT - ASSESSMENT
40 y/o F PMx significant for MRSA bacteremia, COPD, Hypertension, ESRD on hemodialysis (MWF) (kidney bx c/w ANCA vasculitis), cocaine and heroin abuse, depression, GERD, epilepsy, Raynaud's syndrome, Rheumatoid arthritis, SLE, seizure disorder, hospitalized 12/15/21 through 12/18/21 for seizure, presented in the ER on 01/18 via EMS  for AMS.     -Pt w leukocytosis, sinus tachycardia, high sensitivity trop of 286, elevated BNP, + cocaine and THC on Utox. Cardiology consulted for further evaluation of elevated trop/BNP. Patient now in CCU intubation, most likely 2/2 aspiration PNA and inability to protect airway.     #LV Dysfunction  ( New )  -Echo demonstrating  Moderately reduced left ventricular systolic function. Estimated left ventricular ejection fraction is 40-45 %. Overall LV dysfunction appears global.  -Changes worsened when compared to Echo from November, not present in Echo from October.   -Systolic dysfunction may be 2/2 acute sepsis picture, may improve if patient improves.   -Once patient is more stable, if LV systolic function does not improve, patient may benefit from ischemic work-up    #Sinus Tachycardia  -Pt on cardiac monitor, pulse in the 120's  -EKG notable for sinus tachy, no ST segment changes or heart blocks appreciated  -Most likely 2/2 sepsis/hypotension  -Continue to monitor , lopressor 2.5 mg q 6 hours  if persistently hypertensive     #Hypotension now hypertensive diastolic pressure ,   -Most likely 2/2 acute infection/sedation from intubation  -Extremities warm, currently off pressors  -Continue to monitor fluid status closely  -D/C Amlodipine and home antihypertensives    #Elevated troponin  -High sensitivity trop 286  -Negligible. Most likely 2/2 Type II MI, demand injury due to cocaine abuse. EKG notable for sinus tachy, no ST segment changes appreciated    #Elevated BNP  -Unclear clinical significance given ESRD on HD  -Not clinically fluid overloaded on physical exam

## 2022-01-23 LAB
ANION GAP SERPL CALC-SCNC: 16 MMOL/L — SIGNIFICANT CHANGE UP (ref 5–17)
BUN SERPL-MCNC: 51 MG/DL — HIGH (ref 7–23)
CALCIUM SERPL-MCNC: 8.6 MG/DL — SIGNIFICANT CHANGE UP (ref 8.5–10.1)
CHLORIDE SERPL-SCNC: 96 MMOL/L — SIGNIFICANT CHANGE UP (ref 96–108)
CO2 SERPL-SCNC: 21 MMOL/L — LOW (ref 22–31)
CREAT SERPL-MCNC: 6.51 MG/DL — HIGH (ref 0.5–1.3)
CULTURE RESULTS: NO GROWTH — SIGNIFICANT CHANGE UP
GLUCOSE SERPL-MCNC: 85 MG/DL — SIGNIFICANT CHANGE UP (ref 70–99)
GRAM STN FLD: SIGNIFICANT CHANGE UP
HCT VFR BLD CALC: 37.8 % — SIGNIFICANT CHANGE UP (ref 34.5–45)
HGB BLD-MCNC: 11.7 G/DL — SIGNIFICANT CHANGE UP (ref 11.5–15.5)
MAGNESIUM SERPL-MCNC: 2.1 MG/DL — SIGNIFICANT CHANGE UP (ref 1.6–2.6)
MCHC RBC-ENTMCNC: 29.2 PG — SIGNIFICANT CHANGE UP (ref 27–34)
MCHC RBC-ENTMCNC: 31 GM/DL — LOW (ref 32–36)
MCV RBC AUTO: 94.3 FL — SIGNIFICANT CHANGE UP (ref 80–100)
PHOSPHATE SERPL-MCNC: 7.9 MG/DL — HIGH (ref 2.5–4.5)
PLATELET # BLD AUTO: 291 K/UL — SIGNIFICANT CHANGE UP (ref 150–400)
POTASSIUM SERPL-MCNC: 3 MMOL/L — LOW (ref 3.5–5.3)
POTASSIUM SERPL-SCNC: 3 MMOL/L — LOW (ref 3.5–5.3)
RBC # BLD: 4.01 M/UL — SIGNIFICANT CHANGE UP (ref 3.8–5.2)
RBC # FLD: 14.8 % — HIGH (ref 10.3–14.5)
SODIUM SERPL-SCNC: 133 MMOL/L — LOW (ref 135–145)
SPECIMEN SOURCE: SIGNIFICANT CHANGE UP
SPECIMEN SOURCE: SIGNIFICANT CHANGE UP
WBC # BLD: 13 K/UL — HIGH (ref 3.8–10.5)
WBC # FLD AUTO: 13 K/UL — HIGH (ref 3.8–10.5)

## 2022-01-23 PROCEDURE — 99233 SBSQ HOSP IP/OBS HIGH 50: CPT

## 2022-01-23 PROCEDURE — 99291 CRITICAL CARE FIRST HOUR: CPT

## 2022-01-23 RX ORDER — POTASSIUM CHLORIDE 20 MEQ
10 PACKET (EA) ORAL ONCE
Refills: 0 | Status: COMPLETED | OUTPATIENT
Start: 2022-01-23 | End: 2022-01-23

## 2022-01-23 RX ORDER — CARVEDILOL PHOSPHATE 80 MG/1
3.12 CAPSULE, EXTENDED RELEASE ORAL EVERY 12 HOURS
Refills: 0 | Status: DISCONTINUED | OUTPATIENT
Start: 2022-01-23 | End: 2022-01-30

## 2022-01-23 RX ORDER — PANTOPRAZOLE SODIUM 20 MG/1
40 TABLET, DELAYED RELEASE ORAL
Refills: 0 | Status: DISCONTINUED | OUTPATIENT
Start: 2022-01-23 | End: 2022-01-27

## 2022-01-23 RX ORDER — ACETAMINOPHEN 500 MG
975 TABLET ORAL ONCE
Refills: 0 | Status: COMPLETED | OUTPATIENT
Start: 2022-01-23 | End: 2022-01-23

## 2022-01-23 RX ORDER — ACETAMINOPHEN 500 MG
1000 TABLET ORAL ONCE
Refills: 0 | Status: COMPLETED | OUTPATIENT
Start: 2022-01-23 | End: 2022-01-23

## 2022-01-23 RX ORDER — POTASSIUM CHLORIDE 20 MEQ
20 PACKET (EA) ORAL ONCE
Refills: 0 | Status: COMPLETED | OUTPATIENT
Start: 2022-01-23 | End: 2022-01-23

## 2022-01-23 RX ADMIN — Medication 50 MILLIGRAM(S): at 06:38

## 2022-01-23 RX ADMIN — Medication 125 MILLIGRAM(S): at 13:17

## 2022-01-23 RX ADMIN — Medication 125 MILLIGRAM(S): at 06:38

## 2022-01-23 RX ADMIN — PROPOFOL 2.86 MICROGRAM(S)/KG/MIN: 10 INJECTION, EMULSION INTRAVENOUS at 09:46

## 2022-01-23 RX ADMIN — Medication 20 MILLIEQUIVALENT(S): at 11:40

## 2022-01-23 RX ADMIN — SODIUM CHLORIDE 75 MILLILITER(S): 9 INJECTION INTRAMUSCULAR; INTRAVENOUS; SUBCUTANEOUS at 05:30

## 2022-01-23 RX ADMIN — Medication 1 PATCH: at 19:19

## 2022-01-23 RX ADMIN — Medication 1 DROP(S): at 09:47

## 2022-01-23 RX ADMIN — Medication 2 MILLIGRAM(S): at 20:17

## 2022-01-23 RX ADMIN — Medication 10 MILLIGRAM(S): at 06:38

## 2022-01-23 RX ADMIN — PROPOFOL 2.86 MICROGRAM(S)/KG/MIN: 10 INJECTION, EMULSION INTRAVENOUS at 16:00

## 2022-01-23 RX ADMIN — HEPARIN SODIUM 5000 UNIT(S): 5000 INJECTION INTRAVENOUS; SUBCUTANEOUS at 21:06

## 2022-01-23 RX ADMIN — PANTOPRAZOLE SODIUM 40 MILLIGRAM(S): 20 TABLET, DELAYED RELEASE ORAL at 21:06

## 2022-01-23 RX ADMIN — SODIUM CHLORIDE 75 MILLILITER(S): 9 INJECTION INTRAMUSCULAR; INTRAVENOUS; SUBCUTANEOUS at 21:35

## 2022-01-23 RX ADMIN — Medication 2 MILLIGRAM(S): at 16:00

## 2022-01-23 RX ADMIN — CHLORHEXIDINE GLUCONATE 15 MILLILITER(S): 213 SOLUTION TOPICAL at 21:07

## 2022-01-23 RX ADMIN — CHLORHEXIDINE GLUCONATE 15 MILLILITER(S): 213 SOLUTION TOPICAL at 09:48

## 2022-01-23 RX ADMIN — Medication 975 MILLIGRAM(S): at 17:15

## 2022-01-23 RX ADMIN — Medication 400 MILLIGRAM(S): at 21:07

## 2022-01-23 RX ADMIN — Medication 10 MILLIGRAM(S): at 18:26

## 2022-01-23 RX ADMIN — Medication 125 MILLIGRAM(S): at 18:26

## 2022-01-23 RX ADMIN — PIPERACILLIN AND TAZOBACTAM 25 GRAM(S): 4; .5 INJECTION, POWDER, LYOPHILIZED, FOR SOLUTION INTRAVENOUS at 11:37

## 2022-01-23 RX ADMIN — Medication 2 MILLIGRAM(S): at 10:12

## 2022-01-23 RX ADMIN — FOSPHENYTOIN 108 MILLIGRAM(S) PE: 50 INJECTION INTRAMUSCULAR; INTRAVENOUS at 21:07

## 2022-01-23 RX ADMIN — CARVEDILOL PHOSPHATE 3.12 MILLIGRAM(S): 80 CAPSULE, EXTENDED RELEASE ORAL at 21:07

## 2022-01-23 RX ADMIN — Medication 10 MILLIGRAM(S): at 13:31

## 2022-01-23 RX ADMIN — Medication 650 MILLIGRAM(S): at 05:00

## 2022-01-23 RX ADMIN — PROPOFOL 2.86 MICROGRAM(S)/KG/MIN: 10 INJECTION, EMULSION INTRAVENOUS at 00:02

## 2022-01-23 RX ADMIN — Medication 10 MILLIGRAM(S): at 06:39

## 2022-01-23 RX ADMIN — ATOVAQUONE 1500 MILLIGRAM(S): 750 SUSPENSION ORAL at 09:47

## 2022-01-23 RX ADMIN — CARVEDILOL PHOSPHATE 3.12 MILLIGRAM(S): 80 CAPSULE, EXTENDED RELEASE ORAL at 09:46

## 2022-01-23 RX ADMIN — LEVETIRACETAM 400 MILLIGRAM(S): 250 TABLET, FILM COATED ORAL at 21:07

## 2022-01-23 RX ADMIN — HEPARIN SODIUM 5000 UNIT(S): 5000 INJECTION INTRAVENOUS; SUBCUTANEOUS at 09:46

## 2022-01-23 RX ADMIN — Medication 975 MILLIGRAM(S): at 16:19

## 2022-01-23 RX ADMIN — Medication 200 MILLIGRAM(S): at 09:47

## 2022-01-23 RX ADMIN — Medication 10 MILLIGRAM(S): at 11:40

## 2022-01-23 RX ADMIN — PIPERACILLIN AND TAZOBACTAM 25 GRAM(S): 4; .5 INJECTION, POWDER, LYOPHILIZED, FOR SOLUTION INTRAVENOUS at 21:07

## 2022-01-23 RX ADMIN — PROPOFOL 2.86 MICROGRAM(S)/KG/MIN: 10 INJECTION, EMULSION INTRAVENOUS at 21:35

## 2022-01-23 RX ADMIN — Medication 1 DROP(S): at 21:07

## 2022-01-23 RX ADMIN — Medication 3 MILLIGRAM(S): at 21:07

## 2022-01-23 RX ADMIN — PROPOFOL 2.86 MICROGRAM(S)/KG/MIN: 10 INJECTION, EMULSION INTRAVENOUS at 06:46

## 2022-01-23 NOTE — PROGRESS NOTE ADULT - ASSESSMENT
Patient with history ESRD on dialysis  with altered mental status,   LP negative, no more awake  fevers  likely aspiration pneumonia, significant infiltrate on cxr  Acute respiratory failure  seizures  r/o sepsis    1. now intubated, will check sputum culture, minimal infiltrate on cxr, LP negative      ct does show dense lll infitlrae on vanco . zosyn      hx. c. dif diarrhea, repeat negative,   PO vanco while on abx  2. seizures on dilantin,    3. 5 zosyn, 5 mepron,  4, now po vanco day 2  5. plaquenil for vasulitis, chronic steroids  6.  Dialysis yesterday  7. high ng residuals started on  reglan, may need to consider tpn    D/w Patients , felice Mane, who states they are still  and he is decision maker Phone number [phone]

## 2022-01-23 NOTE — PROGRESS NOTE ADULT - ASSESSMENT
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  on HD MWF. Recent admission for MRSA bacteremia - permcath was replaced . 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

## 2022-01-23 NOTE — PROGRESS NOTE ADULT - SUBJECTIVE AND OBJECTIVE BOX
Events Overnight: Patient stilll with low grade fevers.    HPI:  42 y/o F with PMx significant for ESRD, MRSA bacteremia, COPD, Hypertension, ESRD on hemodialysis (MWF) (kidney bx c/w ANCA vasculitis), cocaine and heroin abuse, depression, , epilepsy, Raynaud's syndrome, Rheumatoid arthritis, SLE, seizure disorder (hospitalized 12/15/21 through 12/18/21 for seizure, was discharged on Dilantin), presented to ED via EMS for AMS on 1/18/21.   Patient was admitted from ED   for leukocytosis seen on labs, AMS, low dilantin serum levels, +cocaine, THC  on Utox,  CTH on 1/18 was negative  Echo ef 40 %.  CT chest/Abd pelvis, colitis, lll infitlrate, RP lymphadenopathy  still some fevers  negative LP  ? aspiration and was intubated am 1/20  on vanco, zosyn, sputum culture negative so far     MEDICATIONS  (STANDING):  artificial  tears Solution 1 Drop(s) Both EYES two times a day  atovaquone  Suspension 1500 milliGRAM(s) Oral daily  carvedilol 3.125 milliGRAM(s) Oral every 12 hours  chlorhexidine 0.12% Liquid 15 milliLiter(s) Oral Mucosa every 12 hours  fosphenytoin IVPB 200 milliGRAM(s) PE IV Intermittent <User Schedule>  heparin   Injectable 5000 Unit(s) SubCutaneous every 12 hours  hydrALAZINE 10 milliGRAM(s) Oral every 8 hours  hydroxychloroquine 200 milliGRAM(s) Oral daily  levETIRAcetam  IVPB 500 milliGRAM(s) IV Intermittent at bedtime  methylPREDNISolone sodium succinate Injectable 50 milliGRAM(s) IV Push daily  metoclopramide Injectable 10 milliGRAM(s) IV Push every 6 hours  pantoprazole  Injectable 40 milliGRAM(s) IV Push two times a day  piperacillin/tazobactam IVPB.. 3.375 Gram(s) IV Intermittent every 12 hours  potassium chloride   Powder 20 milliEquivalent(s) Enteral Tube once  potassium chloride  10 mEq/100 mL IVPB 10 milliEquivalent(s) IV Intermittent once  propofol Infusion 10 MICROgram(s)/kG/Min (2.86 mL/Hr) IV Continuous <Continuous>  sodium chloride 0.9%. 1000 milliLiter(s) (75 mL/Hr) IV Continuous <Continuous>  vancomycin    Solution 125 milliGRAM(s) Oral every 6 hours  vancomycin  IVPB 500 milliGRAM(s) IV Intermittent <User Schedule>    MEDICATIONS  (PRN):  acetaminophen  Suppository .. 650 milliGRAM(s) Rectal every 6 hours PRN Temp greater or equal to 38C (100.4F)  albumin human 25% IVPB 50 milliLiter(s) IV Intermittent every 1 hour PRN SBP less than 100  ALBUTerol    90 MICROgram(s) HFA Inhaler 2 Puff(s) Inhalation every 6 hours PRN Shortness of Breath and/or Wheezing  aluminum hydroxide/magnesium hydroxide/simethicone Suspension 30 milliLiter(s) Oral every 4 hours PRN Dyspepsia  hydrALAZINE Injectable 10 milliGRAM(s) IV Push every 6 hours PRN SBP>140 or MAP>100  levETIRAcetam  IVPB 250 milliGRAM(s) IV Intermittent daily PRN After dialysis  LORazepam   Injectable 2 milliGRAM(s) IV Push every 2 hours PRN Agitation  melatonin 3 milliGRAM(s) Oral at bedtime PRN Insomnia  metoprolol tartrate Injectable 2.5 milliGRAM(s) IV Push every 6 hours PRN HR.130 sustained  ondansetron Injectable 4 milliGRAM(s) IV Push every 8 hours PRN Nausea and/or Vomiting  simethicone 80 milliGRAM(s) Chew three times a day PRN Gas      ICU Vital Signs Last 24 Hrs  T(C): 38.3 (23 Jan 2022 08:45), Max: 38.3 (23 Jan 2022 05:00)  T(F): 101 (23 Jan 2022 08:45), Max: 101 (23 Jan 2022 08:45)  HR: 110 (23 Jan 2022 10:00) (95 - 136)  BP: 122/91 (23 Jan 2022 10:00) (115/92 - 167/115)  BP(mean): 98 (23 Jan 2022 10:00) (94 - 127)  ABP: --  ABP(mean): --  RR: 26 (23 Jan 2022 10:00) (22 - 37)  SpO2: 95% (23 Jan 2022 10:00) (84% - 100%)    Physical Exam    General:  ill sweating  HEENT - orally intubated,   neck  right IJ, J chest wall tessio  chest - right chest wall catheter  cv rrr   abdomen - soft, coffee grounds in ng aspirate  ext -  old scaring track marks      Mode: AC/ CMV (Assist Control/ Continuous Mandatory Ventilation)  RR (machine): 12  TV (machine): 390  FiO2: 40  PEEP: 5  ITime: 1  MAP: 13  PIP: 24      I&O's Summary    22 Jan 2022 07:01  -  23 Jan 2022 07:00  --------------------------------------------------------  IN: 1602 mL / OUT: 600 mL / NET: 1002 mL    23 Jan 2022 07:01  -  23 Jan 2022 11:10  --------------------------------------------------------  IN: 240 mL / OUT: 0 mL / NET: 240 mL                        11.7   13.00 )-----------( 291      ( 23 Jan 2022 05:45 )             37.8       01-23    133<L>  |  96  |  51<H>  ----------------------------<  85  3.0<L>   |  21<L>  |  6.51<H>    Ca    8.6      23 Jan 2022 05:45  Phos  7.9     01-23  Mg     2.1     01-23    ABG - ( 22 Jan 2022 05:42 )  pH, Arterial: 7.55  pH, Blood: x     /  pCO2: 27    /  pO2: 134   / HCO3: 24    / Base Excess: 2.3   /  SaO2: 99        DVT Prophylaxis:   Zosyn                                                              Advanced Directives: Full Code

## 2022-01-23 NOTE — PROGRESS NOTE ADULT - ASSESSMENT
40 y/o F PMx significant for MRSA bacteremia, COPD, Hypertension, ESRD on hemodialysis (MWF) (kidney bx c/w ANCA vasculitis), cocaine and heroin abuse, depression, GERD, epilepsy, Raynaud's syndrome, Rheumatoid arthritis, SLE, seizure disorder, hospitalized 12/15/21 through 12/18/21 for seizure, presented in the ER on 01/18 via EMS  for AMS.     -Pt w leukocytosis, sinus tachycardia, high sensitivity trop of 286, elevated BNP, + cocaine and THC on Utox. Cardiology consulted for further evaluation of elevated trop/BNP. Patient now in CCU intubation, most likely 2/2 aspiration PNA and inability to protect airway.     #LV Dysfunction  ( New )  -Echo demonstrating  Moderately reduced left ventricular systolic function. Estimated left ventricular ejection fraction is 40-45 %. Overall LV dysfunction appears global.  -Changes worsened when compared to Echo from November, not present in Echo from October.   -Systolic dysfunction may be 2/2 acute sepsis picture, uncontrolled hypertension , may improve if patient improves.   -Once patient is more stable, if LV systolic function does not improve, patient may benefit from ischemic work-up  will give IV lopressor for now , will switch to Po coreg     #Sinus Tachycardia  -Pt on cardiac monitor, pulse in the 120's  -EKG notable for sinus tachy, no ST segment changes or heart blocks appreciated  -Most likely 2/2 sepsis/hypotension which is improved , still persistently tachycardic  anxiety? pain ,   -Continue to monitor , lopressor 2.5 mg q 6 hours      #Hypotension now hypertensive diastolic pressure ,   -Most likely 2/2 acute infection/sedation from intubation  -Extremities warm, currently off pressors  -Continue to monitor fluid status closely  -D/C Amlodipine and home antihypertensives    #Elevated troponin  -High sensitivity trop 286  -Negligible. Most likely 2/2 Type II MI, demand injury due to cocaine abuse. EKG notable for sinus tachy, no ST segment changes appreciated    #Elevated BNP  -Unclear clinical significance given ESRD on HD  -Not clinically fluid overloaded on physical exam

## 2022-01-23 NOTE — PROGRESS NOTE ADULT - SUBJECTIVE AND OBJECTIVE BOX
REASON FOR VISIT: Elevated Troponin    42 y/o F PMx significant for MRSA bacteremia, COPD, Hypertension, ESRD on hemodialysis (MWF) (kidney bx c/w ANCA vasculitis), cocaine and heroin abuse, depression, GERD, epilepsy, Raynaud's syndrome, Rheumatoid arthritis, SLE, seizure disorder, hospitalized 12/15/21 through 12/18/21 for seizure, presented in the ER on 01/18 via EMS  for AMS.     -Pt w leukocytosis, sinus tachycardia, high sensitivity trop of 286, elevated BNP, + cocaine and THC on Utox. Cardiology consulted for further evaluation of elevated trop/BNP.     01/20: Overnight events noted, patient became hypoxic despite ventimask, gurgling secretions w elevated RR, obtunded. Patient was transferred to CCU and intubated. Became hypotensive before intubation requiring pressors, currently off pressors. Patient remains in CCU, intubated and sedated, tachy in the 120's at bedside, BP 97/72.   1/21/22  patient is remain intubated , off of pressors , on o2 45%, febrile episodes   1/22/22 patient intubated , sedated   1/23/22  Patient is intubated awake ,comfortable , smiles on talking , on 40% oxygen , tachycardic   hypertensive , coffee ground NGT  suction     MEDICATIONS  (STANDING):  artificial  tears Solution 1 Drop(s) Both EYES two times a day  atovaquone  Suspension 1500 milliGRAM(s) Oral daily  chlorhexidine 0.12% Liquid 15 milliLiter(s) Oral Mucosa every 12 hours  dexMEDEtomidine Infusion 0.2 MICROgram(s)/kG/Hr (2.38 mL/Hr) IV Continuous <Continuous>  fosphenytoin IVPB 200 milliGRAM(s) PE IV Intermittent <User Schedule>  heparin   Injectable 5000 Unit(s) SubCutaneous every 12 hours  hydrALAZINE 10 milliGRAM(s) Oral every 8 hours  hydroxychloroquine 200 milliGRAM(s) Oral daily  levETIRAcetam  IVPB 500 milliGRAM(s) IV Intermittent at bedtime  methylPREDNISolone sodium succinate Injectable 50 milliGRAM(s) IV Push daily  metoclopramide Injectable 10 milliGRAM(s) IV Push every 6 hours  pantoprazole  Injectable 40 milliGRAM(s) IV Push at bedtime  piperacillin/tazobactam IVPB.. 3.375 Gram(s) IV Intermittent every 12 hours  propofol Infusion 10 MICROgram(s)/kG/Min (2.86 mL/Hr) IV Continuous <Continuous>  sodium chloride 0.9%. 1000 milliLiter(s) (75 mL/Hr) IV Continuous <Continuous>  vancomycin    Solution 125 milliGRAM(s) Oral every 6 hours  vancomycin  IVPB 500 milliGRAM(s) IV Intermittent <User Schedule>    MEDICATIONS  (PRN):  acetaminophen  Suppository .. 650 milliGRAM(s) Rectal every 6 hours PRN Temp greater or equal to 38C (100.4F)  albumin human 25% IVPB 50 milliLiter(s) IV Intermittent every 1 hour PRN SBP less than 100  ALBUTerol    90 MICROgram(s) HFA Inhaler 2 Puff(s) Inhalation every 6 hours PRN Shortness of Breath and/or Wheezing  aluminum hydroxide/magnesium hydroxide/simethicone Suspension 30 milliLiter(s) Oral every 4 hours PRN Dyspepsia  hydrALAZINE Injectable 10 milliGRAM(s) IV Push every 6 hours PRN SBP>140 or MAP>100  levETIRAcetam  IVPB 250 milliGRAM(s) IV Intermittent daily PRN After dialysis  LORazepam   Injectable 2 milliGRAM(s) IV Push every 2 hours PRN Agitation  melatonin 3 milliGRAM(s) Oral at bedtime PRN Insomnia  metoprolol tartrate Injectable 2.5 milliGRAM(s) IV Push every 6 hours PRN HR.130 sustained  ondansetron Injectable 4 milliGRAM(s) IV Push every 8 hours PRN Nausea and/or Vomiting  simethicone 80 milliGRAM(s) Chew three times a day PRN Gas      Vital Signs Last 24 Hrs  T(C): 38.3 (23 Jan 2022 05:00), Max: 38.3 (23 Jan 2022 05:00)  T(F): 100.9 (23 Jan 2022 05:00), Max: 100.9 (23 Jan 2022 05:00)  HR: 117 (23 Jan 2022 06:07) (94 - 136)  BP: 132/100 (23 Jan 2022 06:00) (115/92 - 167/115)  BP(mean): 106 (23 Jan 2022 06:00) (94 - 127)  RR: 26 (23 Jan 2022 06:00) (12 - 37)  SpO2: 100% (23 Jan 2022 06:07) (91% - 100%)    I&O's Summary    22 Jan 2022 07:01  -  23 Jan 2022 07:00  --------------------------------------------------------  IN: 1602 mL / OUT: 600 mL / NET: 1002 mL            Physical Exam   Gen: sedated  vented   CV: sinus tachycardia, s1/s2, no M/R/G  Pulm: crackles resolving, currently intubated   Abd: normoactive bowel sounds in all 4 quadrants, soft, nontender, nondistended, no rebound, no guarding, no masses  Extremities: warm, no pedal edema, pedal pulses palpable, LLE contraction, uses wheelchair to ambulate previously  Skin: nl warm and dry, no wounds   Neuro: sedated       Labs                                       11.7   13.00 )-----------( 291      ( 23 Jan 2022 05:45 )             37.8     01-23    133<L>  |  96  |  51<H>  ----------------------------<  85  3.0<L>   |  21<L>  |  6.51<H>    Ca    8.6      23 Jan 2022 05:45  Phos  7.9     01-23  Mg     2.1     01-23                  Culture Results:   Culture is being performed. (01-20-22 @ 14:32)  Culture Results:   No growth (01-20-22 @ 14:32)  Culture Results:   Testing in progress (01-20-22 @ 14:32)      < from: Xray Chest 1 View- PORTABLE-Urgent (Xray Chest 1 View- PORTABLE-Urgent .) (01.18.22 @ 15:13) >  IMPRESSION: Large right jugular line. Otherwise unremarkable study.    < end of copied text >      < from: CT Head No Cont (01.18.22 @ 15:44) >  IMPRESSION: No evidence of acute hemorrhage mass mass effect in posterior   fossa or supratentorial region.    No fracture or dislocation involving the cervical spine.    < end of copied text >    < from: CT Chest No Cont (01.18.22 @ 15:52) >  IMPRESSION: New subtle compression deformities of the superior T5 and T6   endplates. No evidence of visceral organ injury.      < end of copied text >    < from: TTE Echo Complete w/o Contrast w/ Doppler (01.19.22 @ 08:38) >   Summary     The left ventricle is normal in size.   Moderately reduced left ventricular systolic function.   Estimated left ventricular ejection fraction is 40-45 %.   Overall LV dysfunction appears global.   Normal appearing right ventricle structure and function.   Normal appearing mitral valve structure and function.   Mild (1+) mitral regurgitation is present.   Normal appearing tricuspid valve structure and function.   Trace tricuspid valve regurgitation is present.   No evidence of pericardial effusion.     Signature     ----------------------------------------------------------------   Electronically signed by Jamie Pastrana DO(Interpreting   physician) on 01/19/2022 10:04 AM    < end of copied text >        monitor sinus tachycardia  episodes

## 2022-01-23 NOTE — PROGRESS NOTE ADULT - SUBJECTIVE AND OBJECTIVE BOX
* pt seen earlier today    + diarrhea     awake on vent    Patient is a 41y Female whom presented to the hospital with AMS  intubated for airway protection due to worsening MS w fevers and possible aspiration     Hx of drug abuse, MRSA bacteremia, COPD, Hypertension, Crescentic ANCA GN leading to  hemodialysis (MWF)  due to cocaine and heroin abuse  urine tox was + for cocaine on this admission   Also recent admit for seizures after non-compliance with sz meds.        PAST MEDICAL & SURGICAL HISTORY:  Rheumatoid arthritis    H/O Raynaud&#x27;s syndrome    Heroin abuse    Smoker    Sepsis    HTN (hypertension)    COPD (chronic obstructive pulmonary disease)    Depression    Epilepsy    GERD (gastroesophageal reflux disease)    Bacteremia    Systemic lupus erythematosus    Aphonia    H/O tubal ligation    H/O appendicitis    Gall bladder stones    H/O tracheostomy    S/P percutaneous endoscopic gastrostomy (PEG) tube placement    MEDICATIONS  (STANDING):  artificial  tears Solution 1 Drop(s) Both EYES two times a day  atovaquone  Suspension 1500 milliGRAM(s) Oral daily  carvedilol 3.125 milliGRAM(s) Oral every 12 hours  chlorhexidine 0.12% Liquid 15 milliLiter(s) Oral Mucosa every 12 hours  fosphenytoin IVPB 200 milliGRAM(s) PE IV Intermittent <User Schedule>  heparin   Injectable 5000 Unit(s) SubCutaneous every 12 hours  hydrALAZINE 10 milliGRAM(s) Oral every 8 hours  hydroxychloroquine 200 milliGRAM(s) Oral daily  levETIRAcetam  IVPB 500 milliGRAM(s) IV Intermittent at bedtime  methylPREDNISolone sodium succinate Injectable 50 milliGRAM(s) IV Push daily  metoclopramide Injectable 10 milliGRAM(s) IV Push every 6 hours  pantoprazole  Injectable 40 milliGRAM(s) IV Push two times a day  piperacillin/tazobactam IVPB.. 3.375 Gram(s) IV Intermittent every 12 hours  propofol Infusion 10 MICROgram(s)/kG/Min (2.86 mL/Hr) IV Continuous <Continuous>  sodium chloride 0.9%. 1000 milliLiter(s) (75 mL/Hr) IV Continuous <Continuous>  vancomycin    Solution 125 milliGRAM(s) Oral every 6 hours  vancomycin  IVPB 500 milliGRAM(s) IV Intermittent <User Schedule>      Allergies    morphine (Rash)    Intolerances        SOCIAL HISTORY:  + drug use      REVIEW OF SYSTEMS:  UTO      Vital Signs Last 24 Hrs  T(C): 37 (2022 16:01), Max: 38.3 (2022 05:00)  T(F): 98.6 (2022 16:01), Max: 101 (2022 08:45)  HR: 101 (2022 16:15) (88 - 136)  BP: 135/63 (2022 16:15) (115/92 - 135/63)  BP(mean): 81 (2022 16:15) (81 - 111)  RR: 16 (2022 16:15) (16 - 37)  SpO2: 100% (2022 16:15) (84% - 100%)        PHYSICAL EXAM:    Constitutional: frail, >>stated age  HEENT: frail, temp wasting  Neck: No LAD, No JVD  Respiratory: scatt ronchi   S1 and S2, RRR  Gastrointestinal: BS+   Neurological: sedated on vent   marcy cath R        LABS:                                   11.7   13.00 )-----------( 291      ( 2022 05:45 )             37.8                         11.1   11.42 )-----------( 253      ( 2022 06:02 )             36.1         133    |  96     |  51     ----------------------------<  85        2022 05:45  3.0     |  21     |  6.51     136    |  99     |  35     ----------------------------<  87        2022 06:02  3.6     |  24     |  5.02     139    |  94     |  67     ----------------------------<  150       2022 06:56  4.2     |  30     |  7.97     Ca    8.6        2022 05:45  Ca    8.5        2022 06:02    Phos  7.9       2022 05:45    Mg     2.1       2022 05:45          Urine Studies:  Urinalysis Basic - ( 2022 14:06 )    Color: Yellow / Appearance: Slightly Turbid / S.010 / pH: x  Gluc: x / Ketone: Trace  / Bili: Negative / Urobili: Negative   Blood: x / Protein: 500 mg/dL / Nitrite: Negative   Leuk Esterase: Trace / RBC: 25-50 /HPF / WBC 6-10   Sq Epi: x / Non Sq Epi: Occasional / Bacteria: Few            RADIOLOGY & ADDITIONAL STUDIES:

## 2022-01-24 LAB
ADD ON TEST-SPECIMEN IN LAB: SIGNIFICANT CHANGE UP
ANION GAP SERPL CALC-SCNC: 14 MMOL/L — SIGNIFICANT CHANGE UP (ref 5–17)
ANION GAP SERPL CALC-SCNC: 16 MMOL/L — SIGNIFICANT CHANGE UP (ref 5–17)
BUN SERPL-MCNC: 21 MG/DL — SIGNIFICANT CHANGE UP (ref 7–23)
BUN SERPL-MCNC: 57 MG/DL — HIGH (ref 7–23)
CALCIUM SERPL-MCNC: 8.1 MG/DL — LOW (ref 8.5–10.1)
CALCIUM SERPL-MCNC: 8.9 MG/DL — SIGNIFICANT CHANGE UP (ref 8.5–10.1)
CHLORIDE SERPL-SCNC: 100 MMOL/L — SIGNIFICANT CHANGE UP (ref 96–108)
CHLORIDE SERPL-SCNC: 98 MMOL/L — SIGNIFICANT CHANGE UP (ref 96–108)
CO2 SERPL-SCNC: 17 MMOL/L — LOW (ref 22–31)
CO2 SERPL-SCNC: 21 MMOL/L — LOW (ref 22–31)
CREAT SERPL-MCNC: 3.29 MG/DL — HIGH (ref 0.5–1.3)
CREAT SERPL-MCNC: 7 MG/DL — HIGH (ref 0.5–1.3)
CULTURE RESULTS: SIGNIFICANT CHANGE UP
GLUCOSE SERPL-MCNC: 116 MG/DL — HIGH (ref 70–99)
GLUCOSE SERPL-MCNC: 88 MG/DL — SIGNIFICANT CHANGE UP (ref 70–99)
HCT VFR BLD CALC: 32.4 % — LOW (ref 34.5–45)
HGB BLD-MCNC: 10.4 G/DL — LOW (ref 11.5–15.5)
MCHC RBC-ENTMCNC: 29.4 PG — SIGNIFICANT CHANGE UP (ref 27–34)
MCHC RBC-ENTMCNC: 32.1 GM/DL — SIGNIFICANT CHANGE UP (ref 32–36)
MCV RBC AUTO: 91.5 FL — SIGNIFICANT CHANGE UP (ref 80–100)
PLATELET # BLD AUTO: 306 K/UL — SIGNIFICANT CHANGE UP (ref 150–400)
POTASSIUM SERPL-MCNC: 2.5 MMOL/L — CRITICAL LOW (ref 3.5–5.3)
POTASSIUM SERPL-MCNC: 3.1 MMOL/L — LOW (ref 3.5–5.3)
POTASSIUM SERPL-SCNC: 2.5 MMOL/L — CRITICAL LOW (ref 3.5–5.3)
POTASSIUM SERPL-SCNC: 3.1 MMOL/L — LOW (ref 3.5–5.3)
RBC # BLD: 3.54 M/UL — LOW (ref 3.8–5.2)
RBC # FLD: 14.5 % — SIGNIFICANT CHANGE UP (ref 10.3–14.5)
SODIUM SERPL-SCNC: 133 MMOL/L — LOW (ref 135–145)
SODIUM SERPL-SCNC: 133 MMOL/L — LOW (ref 135–145)
SPECIMEN SOURCE: SIGNIFICANT CHANGE UP
WBC # BLD: 12.07 K/UL — HIGH (ref 3.8–10.5)
WBC # FLD AUTO: 12.07 K/UL — HIGH (ref 3.8–10.5)

## 2022-01-24 PROCEDURE — 99291 CRITICAL CARE FIRST HOUR: CPT

## 2022-01-24 PROCEDURE — 99233 SBSQ HOSP IP/OBS HIGH 50: CPT

## 2022-01-24 PROCEDURE — 71045 X-RAY EXAM CHEST 1 VIEW: CPT | Mod: 26

## 2022-01-24 RX ORDER — NICARDIPINE HYDROCHLORIDE 30 MG/1
5 CAPSULE, EXTENDED RELEASE ORAL
Qty: 40 | Refills: 0 | Status: DISCONTINUED | OUTPATIENT
Start: 2022-01-24 | End: 2022-01-24

## 2022-01-24 RX ORDER — POTASSIUM CHLORIDE 20 MEQ
10 PACKET (EA) ORAL
Refills: 0 | Status: COMPLETED | OUTPATIENT
Start: 2022-01-24 | End: 2022-01-24

## 2022-01-24 RX ORDER — HEPARIN SODIUM 5000 [USP'U]/ML
5000 INJECTION INTRAVENOUS; SUBCUTANEOUS EVERY 8 HOURS
Refills: 0 | Status: DISCONTINUED | OUTPATIENT
Start: 2022-01-24 | End: 2022-01-27

## 2022-01-24 RX ADMIN — SODIUM CHLORIDE 75 MILLILITER(S): 9 INJECTION INTRAMUSCULAR; INTRAVENOUS; SUBCUTANEOUS at 11:46

## 2022-01-24 RX ADMIN — Medication 125 MILLIGRAM(S): at 05:54

## 2022-01-24 RX ADMIN — CHLORHEXIDINE GLUCONATE 15 MILLILITER(S): 213 SOLUTION TOPICAL at 09:46

## 2022-01-24 RX ADMIN — PANTOPRAZOLE SODIUM 40 MILLIGRAM(S): 20 TABLET, DELAYED RELEASE ORAL at 21:46

## 2022-01-24 RX ADMIN — Medication 100 MILLIEQUIVALENT(S): at 18:31

## 2022-01-24 RX ADMIN — Medication 10 MILLIGRAM(S): at 00:09

## 2022-01-24 RX ADMIN — LEVETIRACETAM 400 MILLIGRAM(S): 250 TABLET, FILM COATED ORAL at 21:46

## 2022-01-24 RX ADMIN — FOSPHENYTOIN 108 MILLIGRAM(S) PE: 50 INJECTION INTRAMUSCULAR; INTRAVENOUS at 22:43

## 2022-01-24 RX ADMIN — Medication 200 MILLIGRAM(S): at 11:40

## 2022-01-24 RX ADMIN — Medication 125 MILLIGRAM(S): at 00:09

## 2022-01-24 RX ADMIN — Medication 10 MILLIGRAM(S): at 11:36

## 2022-01-24 RX ADMIN — HEPARIN SODIUM 5000 UNIT(S): 5000 INJECTION INTRAVENOUS; SUBCUTANEOUS at 21:48

## 2022-01-24 RX ADMIN — HEPARIN SODIUM 5000 UNIT(S): 5000 INJECTION INTRAVENOUS; SUBCUTANEOUS at 10:57

## 2022-01-24 RX ADMIN — Medication 100 MILLIEQUIVALENT(S): at 16:59

## 2022-01-24 RX ADMIN — Medication 10 MILLIGRAM(S): at 05:52

## 2022-01-24 RX ADMIN — Medication 1 DROP(S): at 21:48

## 2022-01-24 RX ADMIN — Medication 650 MILLIGRAM(S): at 18:16

## 2022-01-24 RX ADMIN — Medication 125 MILLIGRAM(S): at 11:54

## 2022-01-24 RX ADMIN — Medication 125 MILLIGRAM(S): at 18:16

## 2022-01-24 RX ADMIN — CARVEDILOL PHOSPHATE 3.12 MILLIGRAM(S): 80 CAPSULE, EXTENDED RELEASE ORAL at 11:36

## 2022-01-24 RX ADMIN — PIPERACILLIN AND TAZOBACTAM 25 GRAM(S): 4; .5 INJECTION, POWDER, LYOPHILIZED, FOR SOLUTION INTRAVENOUS at 11:37

## 2022-01-24 RX ADMIN — Medication 1 DROP(S): at 09:46

## 2022-01-24 RX ADMIN — Medication 50 MILLIGRAM(S): at 05:52

## 2022-01-24 RX ADMIN — Medication 100 MILLIEQUIVALENT(S): at 17:54

## 2022-01-24 RX ADMIN — Medication 125 MILLIGRAM(S): at 23:36

## 2022-01-24 RX ADMIN — PIPERACILLIN AND TAZOBACTAM 25 GRAM(S): 4; .5 INJECTION, POWDER, LYOPHILIZED, FOR SOLUTION INTRAVENOUS at 21:46

## 2022-01-24 RX ADMIN — ATOVAQUONE 1500 MILLIGRAM(S): 750 SUSPENSION ORAL at 11:39

## 2022-01-24 RX ADMIN — Medication 10 MILLIGRAM(S): at 14:07

## 2022-01-24 RX ADMIN — Medication 10 MILLIGRAM(S): at 22:06

## 2022-01-24 RX ADMIN — Medication 10 MILLIGRAM(S): at 18:16

## 2022-01-24 RX ADMIN — NICARDIPINE HYDROCHLORIDE 25 MG/HR: 30 CAPSULE, EXTENDED RELEASE ORAL at 17:00

## 2022-01-24 RX ADMIN — PANTOPRAZOLE SODIUM 40 MILLIGRAM(S): 20 TABLET, DELAYED RELEASE ORAL at 11:36

## 2022-01-24 RX ADMIN — PROPOFOL 2.86 MICROGRAM(S)/KG/MIN: 10 INJECTION, EMULSION INTRAVENOUS at 09:34

## 2022-01-24 RX ADMIN — Medication 10 MILLIGRAM(S): at 23:37

## 2022-01-24 RX ADMIN — Medication 3 MILLIGRAM(S): at 21:46

## 2022-01-24 RX ADMIN — CARVEDILOL PHOSPHATE 3.12 MILLIGRAM(S): 80 CAPSULE, EXTENDED RELEASE ORAL at 21:46

## 2022-01-24 RX ADMIN — Medication 100 MILLIGRAM(S): at 16:59

## 2022-01-24 NOTE — PROGRESS NOTE ADULT - ASSESSMENT
42 y/o F PMx significant for MRSA bacteremia, COPD, Hypertension, ESRD on hemodialysis (MWF) (kidney bx c/w ANCA vasculitis), cocaine and heroin abuse, depression, GERD, epilepsy, Raynaud's syndrome, Rheumatoid arthritis, SLE, seizure disorder, hospitalized 12/15/21 through 12/18/21 for seizure, presented in the ER via EMS  for AMS. The patient family found her on the floor unresponsive after they left her alone for an hour. The patient respond only to painful stimulation. The patient was evaluated by ICU practitioner and recommended CICU admission. Here noted with elevated wbc ct 29, LA 5.7, imaging remarkable for new subtle compression deformities of superior t5/t6 endplates, RUL bronchiolitis, LLL atelectasis, UA with pyuria, b/l perinephric stranding noted on CT, was given IV vanco/zosyn, is on steroids, on mepron for pcp prophlyaxis.     1. sepsis. aspiration pneumonia. pyelonephritis? ESRD. ANCA vasculitis. bronchiolitis. COPD. substance abuse. hx cdad   - imaging reviewed  - LP results reviewed, CSF PCR (-), CSF cx (-) no evidence of CNS infection   - on mepron for pcp prophylaxis  -  IV zosyn [id number] #6  - on vancomycin post HD #6  - c diff negative, on oral vancomycin [id number] for c diff prophylaxis   - continue with abx coverage   - blood cx, urine cx no growth   - monitor temps  - tolerating abx well so far; no side effects noted  - reason for abx use and side effects reviewed with patient  - supportive care  - fu cbc    2. other issues - care per medicine

## 2022-01-24 NOTE — PROGRESS NOTE ADULT - SUBJECTIVE AND OBJECTIVE BOX
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        PAST MEDICAL & SURGICAL HISTORY:  Rheumatoid arthritis    H/O Raynaud&#x27;s syndrome    Heroin abuse    Smoker    Sepsis    HTN (hypertension)    COPD (chronic obstructive pulmonary disease)    Depression    Epilepsy    GERD (gastroesophageal reflux disease)    Bacteremia    Systemic lupus erythematosus    Aphonia    H/O tubal ligation    H/O appendicitis    Gall bladder stones    H/O tracheostomy    S/P percutaneous endoscopic gastrostomy (PEG) tube placement    MEDICATIONS  (STANDING):  artificial  tears Solution 1 Drop(s) Both EYES two times a day  atovaquone  Suspension 1500 milliGRAM(s) Oral daily  carvedilol 3.125 milliGRAM(s) Oral every 12 hours  chlorhexidine 0.12% Liquid 15 milliLiter(s) Oral Mucosa every 12 hours  fosphenytoin IVPB 200 milliGRAM(s) PE IV Intermittent <User Schedule>  heparin   Injectable 5000 Unit(s) SubCutaneous every 8 hours  hydroxychloroquine 200 milliGRAM(s) Oral daily  levETIRAcetam  IVPB 500 milliGRAM(s) IV Intermittent at bedtime  methylPREDNISolone sodium succinate Injectable 50 milliGRAM(s) IV Push daily  metoclopramide Injectable 10 milliGRAM(s) IV Push every 6 hours  pantoprazole  Injectable 40 milliGRAM(s) IV Push two times a day  piperacillin/tazobactam IVPB.. 3.375 Gram(s) IV Intermittent every 12 hours  vancomycin    Solution 125 milliGRAM(s) Oral every 6 hours        Allergies    morphine (Rash)    Intolerances        SOCIAL HISTORY:  + drug use      REVIEW OF SYSTEMS:  UTO      Vital Signs Last 24 Hrs  T(C): 36.3 (2022 19:29), Max: 37.1 (2022 00:50)  T(F): 97.4 (2022 19:29), Max: 98.8 (2022 05:00)  HR: 121 (2022 22:30) (89 - 121)  BP: 112/72 (2022 22:30) (112/72 - 168/99)  BP(mean): 81 (2022 22:30) (81 - 130)  RR: 15 (2022 22:30) (10 - 43)  SpO2: 100% (2022 22:30) (95% - 100%)      PHYSICAL EXAM:    Constitutional: frail, >>stated age  HEENT: frail, temp wasting  Neck: No LAD, No JVD  Respiratory: scatt ronchi   S1 and S2, RRR  Gastrointestinal: BS+   Neurological:  arousable    marcy cath R        LABS:                                   10.4   12.07 )-----------( 306      ( 2022 07:41 )             32.4                         11.7   13.00 )-----------( 291      ( 2022 05:45 )             37.8         133    |  100    |  21     ----------------------------<  88        2022 14:35  3.1     |  17     |  3.29     133    |  98     |  57     ----------------------------<  116       2022 07:41  2.5     |  21     |  7.00     133    |  96     |  51     ----------------------------<  85        2022 05:45  3.0     |  21     |  6.51     Ca    8.9        2022 14:35  Ca    8.1        2022 07:41    Phos  6.5       2022 07:41  Phos  7.9       2022 05:45    Mg     2.1       2022 05:45    TPro  x      /  Alb  2.3    /  TBili  x      /        2022 07:41  DBili  x      /  AST  x      /  ALT  x      /  AlkPhos  x                Urine Studies:  Urinalysis Basic - ( 2022 14:06 )    Color: Yellow / Appearance: Slightly Turbid / S.010 / pH: x  Gluc: x / Ketone: Trace  / Bili: Negative / Urobili: Negative   Blood: x / Protein: 500 mg/dL / Nitrite: Negative   Leuk Esterase: Trace / RBC: 25-50 /HPF / WBC 6-10   Sq Epi: x / Non Sq Epi: Occasional / Bacteria: Few            RADIOLOGY & ADDITIONAL STUDIES:

## 2022-01-24 NOTE — PROGRESS NOTE ADULT - SUBJECTIVE AND OBJECTIVE BOX
REASON FOR VISIT: Elevated Troponin    42 y/o F PMx significant for MRSA bacteremia, COPD, Hypertension, ESRD on hemodialysis (MWF) (kidney bx c/w ANCA vasculitis), cocaine and heroin abuse, depression, GERD, epilepsy, Raynaud's syndrome, Rheumatoid arthritis, SLE, seizure disorder, hospitalized 12/15/21 through 21 for seizure, presented in the ER on  via EMS  for AMS.     -Pt w leukocytosis, sinus tachycardia, high sensitivity trop of 286, elevated BNP, + cocaine and THC on Utox. Cardiology consulted for further evaluation of elevated trop/BNP.     : Overnight events noted, patient became hypoxic despite ventimask, gurgling secretions w elevated RR, obtunded. Patient was transferred to CCU and intubated. Became hypotensive before intubation requiring pressors, currently off pressors. Patient remains in CCU, intubated and sedated, tachy in the 120's at bedside, BP 97/72.   22  patient is remain intubated , off of pressors , on o2 45%, febrile episodes   22 patient intubated , sedated   22  Patient is intubated awake ,comfortable , smiles on talking , on 40% oxygen , tachycardic   hypertensive , coffee ground NGT  suction   : intubated orients, smiles.    MEDICATIONS:  OUTPATIENT  Home Medications:  Imodium A-D 2 mg oral tablet: 1 tab(s) orally every 4 hours, As Needed (2022 22:40)  Silvadene 1% topical cream: Apply topically to affected area 2 times a day (2022 22:40)  Tylenol 325 mg oral tablet: 2 tab(s) orally every 4 hours, As Needed (2022 22:40)      INPATIENT  MEDICATIONS  (STANDING):  artificial  tears Solution 1 Drop(s) Both EYES two times a day  atovaquone  Suspension 1500 milliGRAM(s) Oral daily  carvedilol 3.125 milliGRAM(s) Oral every 12 hours  chlorhexidine 0.12% Liquid 15 milliLiter(s) Oral Mucosa every 12 hours  fosphenytoin IVPB 200 milliGRAM(s) PE IV Intermittent <User Schedule>  heparin   Injectable 5000 Unit(s) SubCutaneous every 8 hours  hydroxychloroquine 200 milliGRAM(s) Oral daily  levETIRAcetam  IVPB 500 milliGRAM(s) IV Intermittent at bedtime  methylPREDNISolone sodium succinate Injectable 50 milliGRAM(s) IV Push daily  metoclopramide Injectable 10 milliGRAM(s) IV Push every 6 hours  niCARdipine Infusion 5 mG/Hr (25 mL/Hr) IV Continuous <Continuous>  pantoprazole  Injectable 40 milliGRAM(s) IV Push two times a day  piperacillin/tazobactam IVPB.. 3.375 Gram(s) IV Intermittent every 12 hours  potassium chloride  10 mEq/100 mL IVPB 10 milliEquivalent(s) IV Intermittent every 1 hour  propofol Infusion 10 MICROgram(s)/kG/Min (2.86 mL/Hr) IV Continuous <Continuous>  sodium chloride 0.9%. 1000 milliLiter(s) (75 mL/Hr) IV Continuous <Continuous>  vancomycin    Solution 125 milliGRAM(s) Oral every 6 hours    MEDICATIONS  (PRN):  acetaminophen  Suppository .. 650 milliGRAM(s) Rectal every 6 hours PRN Temp greater or equal to 38C (100.4F)  albumin human 25% IVPB 50 milliLiter(s) IV Intermittent every 1 hour PRN SBP less than 100  ALBUTerol    90 MICROgram(s) HFA Inhaler 2 Puff(s) Inhalation every 6 hours PRN Shortness of Breath and/or Wheezing  aluminum hydroxide/magnesium hydroxide/simethicone Suspension 30 milliLiter(s) Oral every 4 hours PRN Dyspepsia  hydrALAZINE Injectable 10 milliGRAM(s) IV Push every 6 hours PRN SBP>140 or MAP>100  levETIRAcetam  IVPB 250 milliGRAM(s) IV Intermittent daily PRN After dialysis  LORazepam   Injectable 2 milliGRAM(s) IV Push every 2 hours PRN Agitation  melatonin 3 milliGRAM(s) Oral at bedtime PRN Insomnia  metoprolol tartrate Injectable 2.5 milliGRAM(s) IV Push every 6 hours PRN HR.130 sustained  ondansetron Injectable 4 milliGRAM(s) IV Push every 8 hours PRN Nausea and/or Vomiting  simethicone 80 milliGRAM(s) Chew three times a day PRN Gas    Vital Signs Last 24 Hrs  T(C): 37 (2022 11:16), Max: 37.2 (2022 22:16)  T(F): 98.6 (2022 11:16), Max: 98.9 (2022 22:16)  HR: 99 (2022 14:00) (89 - 117)  BP: 158/121 (2022 14:00) (126/94 - 158/121)  BP(mean): 129 (2022 14:00) (99 - 130)  RR: 36 (2022 14:00) (13 - 43)  SpO2: 100% (2022 14:00) (95% - 100%)Daily     Daily Weight in k.8 (2022 05:00)I&O's Summary    2022 07:01  -  2022 07:00  --------------------------------------------------------  IN: 3232 mL / OUT: 0 mL / NET: 3232 mL    2022 07:01  -  2022 17:09  --------------------------------------------------------  IN: 500 mL / OUT: 2500 mL / NET: -2000 mL    Physical Exam   Gen: sedated  vented   CV: sinus tachycardia, s1/s2, no M/R/G  Pulm: crackles resolving, currently intubated   Abd: normoactive bowel sounds in all 4 quadrants, soft, nontender, nondistended, no rebound, no guarding, no masses  Extremities: warm, no pedal edema, pedal pulses palpable, LLE contraction, uses wheelchair to ambulate previously  Skin: nl warm and dry, no wounds   Neuro: sedated     LABS: All Labs Reviewed:                        10..07 )-----------( 306      ( 2022 07:41 )             32.4                         11.7   13.00 )-----------( 291      ( 2022 05:45 )             37.8                         11.1   11.42 )-----------( 253      ( 2022 06:02 )             36.1     2022 14:35    133    |  100    |  21     ----------------------------<  88     3.1     |  17     |  3.29   2022 07:41    133    |  98     |  57     ----------------------------<  116    2.5     |  21     |  7.00   2022 05:45    133    |  96     |  51     ----------------------------<  85     3.0     |  21     |  6.51     Ca    8.9        2022 14:35  Ca    8.1        2022 07:41  Ca    8.6        2022 05:45  Phos  6.5       2022 07:41  Phos  7.9       2022 05:45  Mg     2.1       2022 05:45    TPro  x      /  Alb  2.3    /  TBili  x      /  DBili  x      /  AST  x      /  ALT  x      /  AlkPhos  x      2022 07:41      ===============================  < from: Xray Chest 1 View- PORTABLE-Urgent (Xray Chest 1 View- PORTABLE-Urgent .) (22 @ 15:13) >  IMPRESSION: Large right jugular line. Otherwise unremarkable study.    < end of copied text >      < from: CT Head No Cont (22 @ 15:44) >  IMPRESSION: No evidence of acute hemorrhage mass mass effect in posterior   fossa or supratentorial region.    No fracture or dislocation involving the cervical spine.    < end of copied text >    < from: CT Chest No Cont (22 @ 15:52) >  IMPRESSION: New subtle compression deformities of the superior T5 and T6   endplates. No evidence of visceral organ injury.      < end of copied text >    < from: TTE Echo Complete w/o Contrast w/ Doppler (22 @ 08:38) >   Summary     The left ventricle is normal in size.   Moderately reduced left ventricular systolic function.   Estimated left ventricular ejection fraction is 40-45 %.   Overall LV dysfunction appears global.   Normal appearing right ventricle structure and function.   Normal appearing mitral valve structure and function.   Mild (1+) mitral regurgitation is present.   Normal appearing tricuspid valve structure and function.   Trace tricuspid valve regurgitation is present.   No evidence of pericardial effusion.     Signature     ----------------------------------------------------------------   Electronically signed by Jamie Pastrana DO(Interpreting   physician) on 2022 10:04 AM    < end of copied text >  ==============================    Abhinav Kirby M.D.  Cardiology, [facility]  Cell: [phone]  Offices:    ([facility] Office)  [phone] ([facility] Office)

## 2022-01-24 NOTE — PROGRESS NOTE ADULT - ASSESSMENT
A/P: 41 year old female with Acute Hypoxic Respiratory Failure from a LLL Pneumonia  ESRD on HD  RA  Seizure disorder  IVDA    Plan:  CCU    PULM: PSV weaning today, CXR reviewed and LLL ATX/PNA has improved, will extubate    Cardio: Hemodynamics reasonable    Renal: HD as per renal    GI: Hold feeds for weaning    ID: Continue Vanco and Zosyn for PNA, Mepron for PCP prophylaxis     Neuro: Negative LP, continue Keppra for seizure disorder    SQH DVT Prophylaxis

## 2022-01-24 NOTE — PROGRESS NOTE ADULT - SUBJECTIVE AND OBJECTIVE BOX
CC:    HPI:  HPI: The patient a  42 y/o F PMx significant for MRSA bacteremia, COPD, Hypertension, ESRD on hemodialysis (MWF) (kidney bx c/w ANCA vasculitis), cocaine and heroin abuse, depression, GERD, epilepsy, Raynaud's syndrome, Rheumatoid arthritis, SLE, seizure disorder, hospitalized 12/15/21 through 12/18/21 for seizure, presented in the ER via EMS  for AMS. The patient family found her on the floor unresponsive after they lleft her alone for an hour. The patient respond only to painful stimulation. Patient was intubated for a Likely aspiration PNA.      1/24: Patient seen in bed intubated.  She is weaning well.  S/P HD today           PAST MEDICAL & SURGICAL HISTORY:  Rheumatoid arthritis    H/O Raynaud&#x27;s syndrome    Heroin abuse    Smoker    Sepsis    HTN (hypertension)    COPD (chronic obstructive pulmonary disease)    Depression    Epilepsy    GERD (gastroesophageal reflux disease)    Bacteremia    Systemic lupus erythematosus    Aphonia    H/O tubal ligation    H/O appendicitis    Gall bladder stones    H/O tracheostomy    S/P percutaneous endoscopic gastrostomy (PEG) tube placement        FAMILY HISTORY:  Family history of cardiomyopathy (Mother)        Social Hx:    Allergies    morphine (Rash)    Intolerances            ICU Vital Signs Last 24 Hrs  T(C): 37 (24 Jan 2022 11:16), Max: 37.2 (23 Jan 2022 22:16)  T(F): 98.6 (24 Jan 2022 11:16), Max: 98.9 (23 Jan 2022 22:16)  HR: 115 (24 Jan 2022 11:16) (88 - 117)  BP: 144/116 (24 Jan 2022 11:16) (119/92 - 154/123)  BP(mean): 123 (24 Jan 2022 11:16) (81 - 130)  ABP: --  ABP(mean): --  RR: 34 (24 Jan 2022 11:16) (13 - 37)  SpO2: 100% (24 Jan 2022 11:16) (95% - 100%)      Mode: PS (Pressure Support)/ Spontaneous  FiO2: 40  PEEP: 5  PS: 10  ITime: 1  MAP: 8  PIP: 17      I&O's Summary    23 Jan 2022 07:01  -  24 Jan 2022 07:00  --------------------------------------------------------  IN: 3232 mL / OUT: 0 mL / NET: 3232 mL    24 Jan 2022 07:01  -  24 Jan 2022 11:45  --------------------------------------------------------  IN: 500 mL / OUT: 2500 mL / NET: -2000 mL                              10.4   12.07 )-----------( 306      ( 24 Jan 2022 07:41 )             32.4       01-24    133<L>  |  98  |  57<H>  ----------------------------<  116<H>  2.5<LL>   |  21<L>  |  7.00<H>    Ca    8.1<L>      24 Jan 2022 07:41  Phos  6.5     01-24  Mg     2.1     01-23    TPro  x   /  Alb  2.3<L>  /  TBili  x   /  DBili  x   /  AST  x   /  ALT  x   /  AlkPhos  x   01-24                    MEDICATIONS  (STANDING):  artificial  tears Solution 1 Drop(s) Both EYES two times a day  atovaquone  Suspension 1500 milliGRAM(s) Oral daily  carvedilol 3.125 milliGRAM(s) Oral every 12 hours  chlorhexidine 0.12% Liquid 15 milliLiter(s) Oral Mucosa every 12 hours  fosphenytoin IVPB 200 milliGRAM(s) PE IV Intermittent <User Schedule>  heparin   Injectable 5000 Unit(s) SubCutaneous every 12 hours  hydroxychloroquine 200 milliGRAM(s) Oral daily  levETIRAcetam  IVPB 500 milliGRAM(s) IV Intermittent at bedtime  methylPREDNISolone sodium succinate Injectable 50 milliGRAM(s) IV Push daily  metoclopramide Injectable 10 milliGRAM(s) IV Push every 6 hours  pantoprazole  Injectable 40 milliGRAM(s) IV Push two times a day  piperacillin/tazobactam IVPB.. 3.375 Gram(s) IV Intermittent every 12 hours  propofol Infusion 10 MICROgram(s)/kG/Min (2.86 mL/Hr) IV Continuous <Continuous>  sodium chloride 0.9%. 1000 milliLiter(s) (75 mL/Hr) IV Continuous <Continuous>  vancomycin    Solution 125 milliGRAM(s) Oral every 6 hours  vancomycin  IVPB 500 milliGRAM(s) IV Intermittent <User Schedule>    MEDICATIONS  (PRN):  acetaminophen  Suppository .. 650 milliGRAM(s) Rectal every 6 hours PRN Temp greater or equal to 38C (100.4F)  albumin human 25% IVPB 50 milliLiter(s) IV Intermittent every 1 hour PRN SBP less than 100  ALBUTerol    90 MICROgram(s) HFA Inhaler 2 Puff(s) Inhalation every 6 hours PRN Shortness of Breath and/or Wheezing  aluminum hydroxide/magnesium hydroxide/simethicone Suspension 30 milliLiter(s) Oral every 4 hours PRN Dyspepsia  hydrALAZINE Injectable 10 milliGRAM(s) IV Push every 6 hours PRN SBP>140 or MAP>100  levETIRAcetam  IVPB 250 milliGRAM(s) IV Intermittent daily PRN After dialysis  LORazepam   Injectable 2 milliGRAM(s) IV Push every 2 hours PRN Agitation  melatonin 3 milliGRAM(s) Oral at bedtime PRN Insomnia  metoprolol tartrate Injectable 2.5 milliGRAM(s) IV Push every 6 hours PRN HR.130 sustained  ondansetron Injectable 4 milliGRAM(s) IV Push every 8 hours PRN Nausea and/or Vomiting  simethicone 80 milliGRAM(s) Chew three times a day PRN Gas      DVT Prophylaxis: [facility]    Advanced Directives:  Discussed with:    Visit Information: 30    ** Time is exclusive of billed procedures and/or teaching and/or routine family updates.

## 2022-01-24 NOTE — PROGRESS NOTE ADULT - ASSESSMENT
42 y/o F PMx significant for MRSA bacteremia, COPD, Hypertension, ESRD on hemodialysis (MWF) (kidney bx c/w ANCA vasculitis), cocaine and heroin abuse, depression, GERD, epilepsy, Raynaud's syndrome, Rheumatoid arthritis, SLE, seizure disorder, hospitalized 12/15/21 through 12/18/21 for seizure, presented in the ER on 01/18 via EMS  for AMS.     -Pt w leukocytosis, sinus tachycardia, high sensitivity trop of 286, elevated BNP, + cocaine and THC on Utox. Cardiology consulted for further evaluation of elevated trop/BNP. Patient now in CCU intubation, most likely 2/2 aspiration PNA and inability to protect airway.     #LV Dysfunction  ( New )  -Echo demonstrating  Moderately reduced left ventricular systolic function. Estimated left ventricular ejection fraction is 40-45 %. Overall LV dysfunction appears global.  -Changes worsened when compared to Echo from November, not present in Echo from October.   -Systolic dysfunction may be 2/2 acute sepsis picture, uncontrolled hypertension , may improve if patient improves.   -Once patient is more stable, if LV systolic function does not improve, patient may benefit from ischemic work-up  Cont. coreg ,    #Sinus Tachycardia  -Pt on cardiac monitor, pulse in the 120's  -EKG notable for sinus tachy, no ST segment changes or heart blocks appreciated  -Most likely 2/2 sepsis/hypotension which is improved , still persistently tachycardic  anxiety? pain ,   -Continue to monitor    #Hypotension now hypertensive diastolic pressure ,   -Most likely 2/2 acute infection/sedation from intubation  -Extremities warm, currently off pressors  -Continue to monitor fluid status closely    #Elevated troponin  -High sensitivity trop 286  -Negligible. Most likely 2/2 Type II MI, demand injury due to cocaine abuse. EKG notable for sinus tachy, no ST segment changes appreciated    #Elevated BNP  -Unclear clinical significance given ESRD on HD  -Not clinically fluid overloaded on physical exam

## 2022-01-24 NOTE — PROGRESS NOTE ADULT - ASSESSMENT
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  on HD MWF. Recent admission for MRSA bacteremia - permcath was replaced . 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   HD MWF  UF as tolerated 2 Liters   replace K in 4 K bath   Mike catheter, f/u cultures with AMS - NGTD     Hypok sec to GI losses - moniotor       Anemia   TAMARA per protocol     AMS /Seizures/Fevers/Encephalopathy  Aspiration   Fevers   - ID/Neuro workup   - s/p LP neg   - EEG     Hypotension limited HD   off Norvasc and Clonidine patch    on Coreg per cardiology       HTN w tachycardia    if BP rises > 140 then titrate up Coreg for BP as pt is also tachycardic at times   would avoid standing dose hydralazine due to reflex tachycardia risk  - DC this       ** pt seen earlier on HD and d/w HD RN

## 2022-01-24 NOTE — PROGRESS NOTE ADULT - SUBJECTIVE AND OBJECTIVE BOX
Date of service: 22 @ 10:45    pt seen and examined  intubated, on ventilatory support  temps down  ms much improved  tolerating weaning, plan to extubate post HD  having loose stools    ROS: unable to obtain d/t medical condition       MEDICATIONS  (STANDING):  artificial  tears Solution 1 Drop(s) Both EYES two times a day  atovaquone  Suspension 1500 milliGRAM(s) Oral daily  carvedilol 3.125 milliGRAM(s) Oral every 12 hours  chlorhexidine 0.12% Liquid 15 milliLiter(s) Oral Mucosa every 12 hours  fosphenytoin IVPB 200 milliGRAM(s) PE IV Intermittent <User Schedule>  heparin   Injectable 5000 Unit(s) SubCutaneous every 12 hours  hydroxychloroquine 200 milliGRAM(s) Oral daily  levETIRAcetam  IVPB 500 milliGRAM(s) IV Intermittent at bedtime  methylPREDNISolone sodium succinate Injectable 50 milliGRAM(s) IV Push daily  metoclopramide Injectable 10 milliGRAM(s) IV Push every 6 hours  pantoprazole  Injectable 40 milliGRAM(s) IV Push two times a day  piperacillin/tazobactam IVPB.. 3.375 Gram(s) IV Intermittent every 12 hours  propofol Infusion 10 MICROgram(s)/kG/Min (2.86 mL/Hr) IV Continuous <Continuous>  sodium chloride 0.9%. 1000 milliLiter(s) (75 mL/Hr) IV Continuous <Continuous>  vancomycin    Solution 125 milliGRAM(s) Oral every 6 hours  vancomycin  IVPB 500 milliGRAM(s) IV Intermittent <User Schedule>    Vital Signs Last 24 Hrs  T(C): 37.1 (2022 05:00), Max: 37.2 (2022 22:16)  T(F): 98.8 (2022 05:00), Max: 98.9 (2022 22:16)  HR: 111 (2022 10:22) (88 - 112)  BP: 154/123 (2022 10:22) (119/92 - 154/123)  BP(mean): 130 (2022 10:22) (81 - 130)  RR: 26 (2022 10:22) (13 - 37)  SpO2: 100% (2022 10:22) (95% - 100%)    PE:  Constitutional: sedated/intubated   HEENT: NC/AT, EOMI, PERRLA, conjunctivae clear; ears and nose atraumatic; pharynx benign  Neck: supple; thyroid not palpable  Back: no tenderness  Respiratory: decreased breath sounds   Cardiovascular: S1S2 regular, no murmurs  Abdomen: soft, not tender, not distended, positive BS; liver and spleen WNL  Genitourinary: no suprapubic tenderness  Lymphatic: no LN palpable  Musculoskeletal: no muscle tenderness, no joint swelling or tenderness  Extremities: no pedal edema  Neurological/ Psychiatric: no focal deficits  Skin: no rashes; no palpable lesions perm catheter in place    Labs: all available labs reviewed                                              10. )-----------( 306      ( 2022 07:41 )             32.4         133<L>  |  98  |  57<H>  ----------------------------<  116<H>  2.5<LL>   |  21<L>  |  7.00<H>    Ca    8.1<L>      2022 07:41  Phos  6.5       Mg     2.1         TPro  x   /  Alb  2.3<L>  /  TBili  x   /  DBili  x   /  AST  x   /  ALT  x   /  AlkPhos  x           LIVER FUNCTIONS - ( 2022 14:06 )  Alb: 2.9 g/dL / Pro: 8.5 gm/dL / ALK PHOS: 162 U/L / ALT: 19 U/L / AST: 24 U/L / GGT: x           Urinalysis Basic - ( 2022 14:06 )    Color: Yellow / Appearance: Slightly Turbid / S.010 / pH: x  Gluc: x / Ketone: Trace  / Bili: Negative / Urobili: Negative   Blood: x / Protein: 500 mg/dL / Nitrite: Negative   Leuk Esterase: Trace / RBC: 25-50 /HPF / WBC 6-10   Sq Epi: x / Non Sq Epi: Occasional / Bacteria: Few    Culture - Blood (22 @ 17:17)   Specimen Source: .Blood None   Culture Results:   No growth to date.   Culture - Blood (22 @ 14:06)   Specimen Source: .Blood Blood-Peripheral   Culture Results:   No growth to date.   Culture - CSF with Gram Stain . (22 @ 14:32)   Gram Stain:   No polymorphonuclear cells seen   No organisms seen   by cytocentrifuge   Specimen Source: .CSF CSF       Radiology: all available radiological tests reviewed  < from: Xray Chest 1 View- PORTABLE-Urgent (Xray Chest 1 View- PORTABLE-Urgent .) (22 @ 07:17) >    ACC: [id number] EXAM:  XR CHEST PORTABLE URGENT 1V                          PROCEDURE DATE:  2022          INTERPRETATION:  Chest one view    HISTORY: NG tube placement    COMPARISON STUDY: 2022    Frontal view of the chest shows the heartto be normal in size. Right   dialysis catheter tip projects in the lower right atrium. Endotracheal   tube reaches the lower half of the trachea. Nasogastric tube reaches the   central stomach. Right upper quadrant clips are again noted.    The lungs show clear right lung with small left effusion and there is no   evidence of pneumothorax nor right pleural effusion.    IMPRESSION:  NG tube to stomach.      < end of copied text >    ACC: [id number] EXAM:  CT ABDOMEN AND PELVIS                        ACC: [id number] EXAM:  CT CHEST                          PROCEDURE DATE:  2022          INTERPRETATION:  CLINICAL INFORMATION: Status post fall.    COMPARISON: Noncontrast CT of the thorax dated 2021 and   noncontrast CT of the abdomen and pelvis dated 2021.    CONTRAST/COMPLICATIONS:  IV Contrast: NONE  Oral Contrast: NONE  Complications: None reported at time of study completion    PROCEDURE:  CT of the Chest, Abdomen and Pelvis was performed.  Sagittal and coronal reformats were performed.    FINDINGS:  CHEST:  LUNGS AND LARGE AIRWAYS: Patent central airways. Trace tree-in-bud   nodularity in the right upper lobe posterolaterally, compatible with   terminal bronchiolitis. Minimal dependent atelectasis in the left lower   lobe.  PLEURA: No pleural effusion. No pneumothorax or pneumomediastinum.  VESSELS: Right internal jugular dialysis catheter extending to the right   atrial/IVC junction. Within normal limits.  HEART: Heart size is normal. No pericardial effusion.  MEDIASTINUM AND MARY JANE: No lymphadenopathy. Shotty bilateral axillary and   subpectoral lymph nodes.  CHEST WALL AND LOWER NECK: Within normal limits.    ABDOMEN AND PELVIS:  LIVER: Within normal limits.  BILE DUCTS: Normal caliber.  GALLBLADDER: Removed.  SPLEEN: Within normal limits.  PANCREAS: Within normal limits.  ADRENALS: Within normal limits.  KIDNEYS/URETERS: Moderate bilateral perinephric stranding. Otherwise,   within normal limits.    BLADDER: Collapsed.  REPRODUCTIVE ORGANS: Uterus and adnexa within normal limits.    BOWEL: Fluid-filled stomach and proximal duodenum. There is mild diffuse   fluid distention of small bowel loops. The colon is predominantly   collapsed. No bowel obstruction.  PERITONEUM: No ascites.  VESSELS: Within normal limits.  RETROPERITONEUM/LYMPH NODES: Again are noted enlarged left para-aortic   lymph nodes measuring up to 2.2 x 1.7 cm on image 93.  ABDOMINAL WALL: Within normal limits.  BONES: New subtle compression deformities of the superior T5 and T6   endplates. Bilateral shoulder effusions. Erosive changes of the left   humeral head. Degenerative joint space narrowing of the right hip.    IMPRESSION: New subtle compression deformities of the superior T5 and T6   endplates. No evidence of visceral organ injury.          Advanced directives addressed: full resuscitation

## 2022-01-25 LAB
ANION GAP SERPL CALC-SCNC: 18 MMOL/L — HIGH (ref 5–17)
BUN SERPL-MCNC: 34 MG/DL — HIGH (ref 7–23)
CALCIUM SERPL-MCNC: 9.1 MG/DL — SIGNIFICANT CHANGE UP (ref 8.5–10.1)
CHLORIDE SERPL-SCNC: 101 MMOL/L — SIGNIFICANT CHANGE UP (ref 96–108)
CO2 SERPL-SCNC: 15 MMOL/L — LOW (ref 22–31)
CREAT SERPL-MCNC: 4.82 MG/DL — HIGH (ref 0.5–1.3)
GLUCOSE SERPL-MCNC: 100 MG/DL — HIGH (ref 70–99)
HCT VFR BLD CALC: 36.1 % — SIGNIFICANT CHANGE UP (ref 34.5–45)
HGB BLD-MCNC: 11.4 G/DL — LOW (ref 11.5–15.5)
MAGNESIUM SERPL-MCNC: 1.9 MG/DL — SIGNIFICANT CHANGE UP (ref 1.6–2.6)
MCHC RBC-ENTMCNC: 28.9 PG — SIGNIFICANT CHANGE UP (ref 27–34)
MCHC RBC-ENTMCNC: 31.6 GM/DL — LOW (ref 32–36)
MCV RBC AUTO: 91.6 FL — SIGNIFICANT CHANGE UP (ref 80–100)
PHOSPHATE SERPL-MCNC: 4.6 MG/DL — HIGH (ref 2.5–4.5)
PLATELET # BLD AUTO: 403 K/UL — HIGH (ref 150–400)
POTASSIUM SERPL-MCNC: 3 MMOL/L — LOW (ref 3.5–5.3)
POTASSIUM SERPL-SCNC: 3 MMOL/L — LOW (ref 3.5–5.3)
RBC # BLD: 3.94 M/UL — SIGNIFICANT CHANGE UP (ref 3.8–5.2)
RBC # FLD: 14.4 % — SIGNIFICANT CHANGE UP (ref 10.3–14.5)
SODIUM SERPL-SCNC: 134 MMOL/L — LOW (ref 135–145)
VDRL CSF-TITR: SIGNIFICANT CHANGE UP
WBC # BLD: 17.51 K/UL — HIGH (ref 3.8–10.5)
WBC # FLD AUTO: 17.51 K/UL — HIGH (ref 3.8–10.5)

## 2022-01-25 PROCEDURE — 99233 SBSQ HOSP IP/OBS HIGH 50: CPT

## 2022-01-25 RX ADMIN — Medication 125 MILLIGRAM(S): at 05:26

## 2022-01-25 RX ADMIN — ATOVAQUONE 1500 MILLIGRAM(S): 750 SUSPENSION ORAL at 10:56

## 2022-01-25 RX ADMIN — PIPERACILLIN AND TAZOBACTAM 25 GRAM(S): 4; .5 INJECTION, POWDER, LYOPHILIZED, FOR SOLUTION INTRAVENOUS at 10:55

## 2022-01-25 RX ADMIN — LEVETIRACETAM 400 MILLIGRAM(S): 250 TABLET, FILM COATED ORAL at 21:35

## 2022-01-25 RX ADMIN — Medication 50 MILLIGRAM(S): at 05:25

## 2022-01-25 RX ADMIN — HEPARIN SODIUM 5000 UNIT(S): 5000 INJECTION INTRAVENOUS; SUBCUTANEOUS at 21:35

## 2022-01-25 RX ADMIN — Medication 10 MILLIGRAM(S): at 04:33

## 2022-01-25 RX ADMIN — HEPARIN SODIUM 5000 UNIT(S): 5000 INJECTION INTRAVENOUS; SUBCUTANEOUS at 13:57

## 2022-01-25 RX ADMIN — Medication 1 DROP(S): at 10:55

## 2022-01-25 RX ADMIN — Medication 10 MILLIGRAM(S): at 05:25

## 2022-01-25 RX ADMIN — Medication 10 MILLIGRAM(S): at 14:05

## 2022-01-25 RX ADMIN — PANTOPRAZOLE SODIUM 40 MILLIGRAM(S): 20 TABLET, DELAYED RELEASE ORAL at 21:35

## 2022-01-25 RX ADMIN — Medication 10 MILLIGRAM(S): at 18:57

## 2022-01-25 RX ADMIN — PIPERACILLIN AND TAZOBACTAM 25 GRAM(S): 4; .5 INJECTION, POWDER, LYOPHILIZED, FOR SOLUTION INTRAVENOUS at 21:35

## 2022-01-25 RX ADMIN — CARVEDILOL PHOSPHATE 3.12 MILLIGRAM(S): 80 CAPSULE, EXTENDED RELEASE ORAL at 10:56

## 2022-01-25 RX ADMIN — Medication 125 MILLIGRAM(S): at 18:57

## 2022-01-25 RX ADMIN — FOSPHENYTOIN 108 MILLIGRAM(S) PE: 50 INJECTION INTRAMUSCULAR; INTRAVENOUS at 22:10

## 2022-01-25 RX ADMIN — PANTOPRAZOLE SODIUM 40 MILLIGRAM(S): 20 TABLET, DELAYED RELEASE ORAL at 10:56

## 2022-01-25 RX ADMIN — Medication 200 MILLIGRAM(S): at 10:56

## 2022-01-25 RX ADMIN — Medication 125 MILLIGRAM(S): at 14:00

## 2022-01-25 RX ADMIN — Medication 10 MILLIGRAM(S): at 13:57

## 2022-01-25 RX ADMIN — Medication 3 MILLIGRAM(S): at 21:36

## 2022-01-25 RX ADMIN — HEPARIN SODIUM 5000 UNIT(S): 5000 INJECTION INTRAVENOUS; SUBCUTANEOUS at 05:25

## 2022-01-25 RX ADMIN — CARVEDILOL PHOSPHATE 3.12 MILLIGRAM(S): 80 CAPSULE, EXTENDED RELEASE ORAL at 21:36

## 2022-01-25 RX ADMIN — Medication 1 DROP(S): at 21:35

## 2022-01-25 NOTE — PROGRESS NOTE ADULT - ASSESSMENT
42 y/o F PMx significant for MRSA bacteremia, COPD, Hypertension, ESRD on hemodialysis (MWF) (kidney bx c/w ANCA vasculitis), cocaine and heroin abuse, depression, GERD, epilepsy, Raynaud's syndrome, Rheumatoid arthritis, SLE, seizure disorder, hospitalized 12/15/21 through 12/18/21 for seizure, presented in the ER on 01/18 via EMS  for AMS.     -Pt w leukocytosis, sinus tachycardia, high sensitivity trop of 286, elevated BNP, + cocaine and THC on Utox. Cardiology consulted for further evaluation of elevated trop/BNP. Patient now in CCU intubation, most likely 2/2 aspiration PNA and inability to protect airway.     #LV Dysfunction  ( New )  -Echo demonstrating  Moderately reduced left ventricular systolic function. Estimated left ventricular ejection fraction is 40-45 %. Overall LV dysfunction appears global.  -Changes worsened when compared to Echo from November, not present in Echo from October.   -Systolic dysfunction may be 2/2 acute sepsis picture, uncontrolled hypertension , may improve if patient improves.   -Once patient is more stable, if LV systolic function does not improve, patient may benefit from ischemic work-up  Cont. will increase the dose of coreg 6.25 mg po Q 12 hours titrate up as she tolerates     #Sinus Tachycardia  -persistent sinus tachycardia clinically appear comfortable   , pulse in the 120's  -EKG notable for sinus tachy, no ST segment changes or heart blocks appreciated  -Most likely 2/2 sepsis/hypotension which is improved , still persistently tachycardic  anxiety? pain ,   -Continue to monitor    #Resolved Hypotension now hypertensive diastolic pressure ,   -Most likely 2/2 acute infection/sedation from intubation  -Extremities warm, currently off pressors  -Continue to monitor fluid status closely    #Elevated troponin  -High sensitivity trop 286  -Negligible. Most likely 2/2 Type II MI, demand injury due to cocaine abuse. EKG notable for sinus tachy, no ST segment changes appreciated    #Elevated BNP  -Unclear clinical significance given ESRD on HD  -Not clinically fluid overloaded on physical exam

## 2022-01-25 NOTE — PROGRESS NOTE ADULT - SUBJECTIVE AND OBJECTIVE BOX
Patient is a 41y Female who reports no complaints as new, alert. Eating      MEDICATIONS  (STANDING):  artificial  tears Solution 1 Drop(s) Both EYES two times a day  atovaquone  Suspension 1500 milliGRAM(s) Oral daily  carvedilol 3.125 milliGRAM(s) Oral every 12 hours  fosphenytoin IVPB 200 milliGRAM(s) PE IV Intermittent <User Schedule>  heparin   Injectable 5000 Unit(s) SubCutaneous every 8 hours  hydroxychloroquine 200 milliGRAM(s) Oral daily  levETIRAcetam  IVPB 500 milliGRAM(s) IV Intermittent at bedtime  methylPREDNISolone sodium succinate Injectable 50 milliGRAM(s) IV Push daily  metoclopramide Injectable 10 milliGRAM(s) IV Push every 6 hours  pantoprazole  Injectable 40 milliGRAM(s) IV Push two times a day  piperacillin/tazobactam IVPB.. 3.375 Gram(s) IV Intermittent every 12 hours  vancomycin    Solution 125 milliGRAM(s) Oral every 6 hours    MEDICATIONS  (PRN):  acetaminophen  Suppository .. 650 milliGRAM(s) Rectal every 6 hours PRN Temp greater or equal to 38C (100.4F)  albumin human 25% IVPB 50 milliLiter(s) IV Intermittent every 1 hour PRN SBP less than 100  ALBUTerol    90 MICROgram(s) HFA Inhaler 2 Puff(s) Inhalation every 6 hours PRN Shortness of Breath and/or Wheezing  aluminum hydroxide/magnesium hydroxide/simethicone Suspension 30 milliLiter(s) Oral every 4 hours PRN Dyspepsia  hydrALAZINE Injectable 10 milliGRAM(s) IV Push every 6 hours PRN SBP>140 or MAP>100  levETIRAcetam  IVPB 250 milliGRAM(s) IV Intermittent daily PRN After dialysis  LORazepam   Injectable 2 milliGRAM(s) IV Push every 2 hours PRN Agitation  melatonin 3 milliGRAM(s) Oral at bedtime PRN Insomnia  metoprolol tartrate Injectable 2.5 milliGRAM(s) IV Push every 6 hours PRN HR.130 sustained  ondansetron Injectable 4 milliGRAM(s) IV Push every 8 hours PRN Nausea and/or Vomiting  simethicone 80 milliGRAM(s) Chew three times a day PRN Gas        T(C): , Max: 36.6 (01-24-22 @ 23:48)  T(F): , Max: 97.9 (01-24-22 @ 23:48)  HR: 122 (01-25-22 @ 06:00)  BP: 121/78 (01-25-22 @ 06:00)  BP(mean): 86 (01-25-22 @ 06:00)  RR: 22 (01-25-22 @ 06:00)  SpO2: 100% (01-25-22 @ 06:00)  Wt(kg): --    01-24 @ 07:01  -  01-25 @ 07:00  --------------------------------------------------------  IN: 1353 mL / OUT: 2500 mL / NET: -1147 mL          PHYSICAL EXAM:    Constitutional: frail, >>stated age  HEENT:dry MM  Neck: No LAD, No JVD  Respiratory: altagraciat olga  Cardiovascular: S1 and S2   Extremities: chronic change/peripheral edema  Neurological: Alert   hd cath        LABS:                        11.4   17.51 )-----------( 403      ( 25 Jan 2022 07:03 )             36.1     25 Jan 2022 07:03    134    |  101    |  34     ----------------------------<  100    3.0     |  15     |  4.82   24 Jan 2022 14:35    133    |  100    |  21     ----------------------------<  88     3.1     |  17     |  3.29   24 Jan 2022 07:41    133    |  98     |  57     ----------------------------<  116    2.5     |  21     |  7.00   23 Jan 2022 05:45    133    |  96     |  51     ----------------------------<  85     3.0     |  21     |  6.51   22 Jan 2022 06:02    136    |  99     |  35     ----------------------------<  87     3.6     |  24     |  5.02     Ca    9.1        25 Jan 2022 07:03  Ca    8.9        24 Jan 2022 14:35  Ca    8.1        24 Jan 2022 07:41  Ca    8.6        23 Jan 2022 05:45  Ca    8.5        22 Jan 2022 06:02  Phos  4.6       25 Jan 2022 07:03  Phos  6.5       24 Jan 2022 07:41  Phos  7.9       23 Jan 2022 05:45  Mg     1.9       25 Jan 2022 07:03  Mg     2.1       23 Jan 2022 05:45    TPro  x      /  Alb  2.3    /  TBili  x      /  DBili  x      /  AST  x      /  ALT  x      /  AlkPhos  x      24 Jan 2022 07:41          Urine Studies:          RADIOLOGY & ADDITIONAL STUDIES:

## 2022-01-25 NOTE — PROGRESS NOTE ADULT - ASSESSMENT
42 y/o F PMx significant for MRSA bacteremia, COPD, Hypertension, ESRD on hemodialysis (MWF) (kidney bx c/w ANCA vasculitis), cocaine and heroin abuse, depression, GERD, epilepsy, Raynaud's syndrome, Rheumatoid arthritis, SLE, seizure disorder, hospitalized 12/15/21 through 12/18/21 for seizure, presented in the ER via EMS  for AMS. The patient family found her on the floor unresponsive after they left her alone for an hour. The patient respond only to painful stimulation. The patient was evaluated by ICU practitioner and recommended CICU admission. Here noted with elevated wbc ct 29, LA 5.7, imaging remarkable for new subtle compression deformities of superior t5/t6 endplates, RUL bronchiolitis, LLL atelectasis, UA with pyuria, b/l perinephric stranding noted on CT, was given IV vanco/zosyn, is on steroids, on mepron for pcp prophlyaxis.     1. sepsis. aspiration pneumonia. pyelonephritis? ESRD. ANCA vasculitis. bronchiolitis. COPD. substance abuse. hx cdad   - imaging reviewed, wbc ct 17 fu cbc ? reactive vs steroid related   - LP results reviewed, CSF PCR (-), CSF cx (-) no evidence of CNS infection   - on mepron for pcp prophylaxis  -  IV zosyn [id number] #7  - on vancomycin post HD #7  - c diff negative, on oral vancomycin [id number] for c diff prophylaxis   - complete 7 day abx course  - repeat xray 1/24 reviewed   - blood cx, urine cx no growth   - monitor temps  - tolerating abx well so far; no side effects noted  - reason for abx use and side effects reviewed with patient  - supportive care  - fu cbc    2. other issues - care per medicine

## 2022-01-25 NOTE — PROGRESS NOTE ADULT - SUBJECTIVE AND OBJECTIVE BOX
REASON FOR VISIT: Elevated Troponin    40 y/o F PMx significant for MRSA bacteremia, COPD, Hypertension, ESRD on hemodialysis (MWF) (kidney bx c/w ANCA vasculitis), cocaine and heroin abuse, depression, GERD, epilepsy, Raynaud's syndrome, Rheumatoid arthritis, SLE, seizure disorder, hospitalized 12/15/21 through 12/18/21 for seizure, presented in the ER on 01/18 via EMS  for AMS.     -Pt w leukocytosis, sinus tachycardia, high sensitivity trop of 286, elevated BNP, + cocaine and THC on Utox. Cardiology consulted for further evaluation of elevated trop/BNP.     01/20: Overnight events noted, patient became hypoxic despite ventimask, gurgling secretions w elevated RR, obtunded. Patient was transferred to CCU and intubated. Became hypotensive before intubation requiring pressors, currently off pressors. Patient remains in CCU, intubated and sedated, tachy in the 120's at bedside, BP 97/72.   1/21/22  patient is remain intubated , off of pressors , on o2 45%, febrile episodes   1/22/22 patient intubated , sedated   1/23/22  Patient is intubated awake ,comfortable , smiles on talking , on 40% oxygen , tachycardic   hypertensive , coffee ground NGT  suction   1/24/'22: intubated orients, smiles.  1/25/22  patient is extubated laying comfortable breathing while supine flat position , persistently tachycardic  denies sob or chest pain     MEDICATIONS  (STANDING):  artificial  tears Solution 1 Drop(s) Both EYES two times a day  atovaquone  Suspension 1500 milliGRAM(s) Oral daily  carvedilol 3.125 milliGRAM(s) Oral every 12 hours  chlorhexidine 0.12% Liquid 15 milliLiter(s) Oral Mucosa every 12 hours  fosphenytoin IVPB 200 milliGRAM(s) PE IV Intermittent <User Schedule>  heparin   Injectable 5000 Unit(s) SubCutaneous every 8 hours  hydroxychloroquine 200 milliGRAM(s) Oral daily  levETIRAcetam  IVPB 500 milliGRAM(s) IV Intermittent at bedtime  methylPREDNISolone sodium succinate Injectable 50 milliGRAM(s) IV Push daily  metoclopramide Injectable 10 milliGRAM(s) IV Push every 6 hours  pantoprazole  Injectable 40 milliGRAM(s) IV Push two times a day  piperacillin/tazobactam IVPB.. 3.375 Gram(s) IV Intermittent every 12 hours  vancomycin    Solution 125 milliGRAM(s) Oral every 6 hours    MEDICATIONS  (PRN):  acetaminophen  Suppository .. 650 milliGRAM(s) Rectal every 6 hours PRN Temp greater or equal to 38C (100.4F)  albumin human 25% IVPB 50 milliLiter(s) IV Intermittent every 1 hour PRN SBP less than 100  ALBUTerol    90 MICROgram(s) HFA Inhaler 2 Puff(s) Inhalation every 6 hours PRN Shortness of Breath and/or Wheezing  aluminum hydroxide/magnesium hydroxide/simethicone Suspension 30 milliLiter(s) Oral every 4 hours PRN Dyspepsia  hydrALAZINE Injectable 10 milliGRAM(s) IV Push every 6 hours PRN SBP>140 or MAP>100  levETIRAcetam  IVPB 250 milliGRAM(s) IV Intermittent daily PRN After dialysis  LORazepam   Injectable 2 milliGRAM(s) IV Push every 2 hours PRN Agitation  melatonin 3 milliGRAM(s) Oral at bedtime PRN Insomnia  metoprolol tartrate Injectable 2.5 milliGRAM(s) IV Push every 6 hours PRN HR.130 sustained  ondansetron Injectable 4 milliGRAM(s) IV Push every 8 hours PRN Nausea and/or Vomiting  simethicone 80 milliGRAM(s) Chew three times a day PRN Gas      Vital Signs Last 24 Hrs  T(C): 35.7 (25 Jan 2022 05:10), Max: 37 (24 Jan 2022 11:16)  T(F): 96.3 (25 Jan 2022 05:10), Max: 98.6 (24 Jan 2022 11:16)  HR: 122 (25 Jan 2022 06:00) (98 - 122)  BP: 121/78 (25 Jan 2022 06:00) (112/72 - 168/99)  BP(mean): 86 (25 Jan 2022 06:00) (81 - 130)  RR: 22 (25 Jan 2022 06:00) (10 - 43)  SpO2: 100% (25 Jan 2022 06:00) (95% - 100%)    I&O's Summary    24 Jan 2022 07:01  -  25 Jan 2022 07:00  --------------------------------------------------------  IN: 1353 mL / OUT: 2500 mL / NET: -1147 mL      Physical Exam   Gen: sedated  vented   CV: sinus tachycardia, s1/s2, no M/R/G  Pulm: crackles resolving, currently intubated   Abd: normoactive bowel sounds in all 4 quadrants, soft, nontender, nondistended, no rebound, no guarding, no masses  Extremities: warm, no pedal edema, pedal pulses palpable, LLE contraction, uses wheelchair to ambulate previously  Skin: nl warm and dry, no wounds   Neuro: sedated     LABS: All Labs Reviewed:                                      10.4 12.07 )-----------( 306      ( 24 Jan 2022 07:41 )             32.4     01-24    133<L>  |  100  |  21  ----------------------------<  88  3.1<L>   |  17<L>  |  3.29<H>    Ca    8.9      24 Jan 2022 14:35  Phos  6.5     01-24    TPro  x   /  Alb  2.3<L>  /  TBili  x   /  DBili  x   /  AST  x   /  ALT  x   /  AlkPhos  x   01-24        LIVER FUNCTIONS - ( 24 Jan 2022 07:41 )  Alb: 2.3 g/dL / Pro: x     / ALK PHOS: x     / ALT: x     / AST: x     / GGT: x                       ===============================  < from: Xray Chest 1 View- PORTABLE-Urgent (Xray Chest 1 View- PORTABLE-Urgent .) (01.18.22 @ 15:13) >  IMPRESSION: Large right jugular line. Otherwise unremarkable study.    < end of copied text >      < from: CT Head No Cont (01.18.22 @ 15:44) >  IMPRESSION: No evidence of acute hemorrhage mass mass effect in posterior   fossa or supratentorial region.    No fracture or dislocation involving the cervical spine.    < end of copied text >    < from: CT Chest No Cont (01.18.22 @ 15:52) >  IMPRESSION: New subtle compression deformities of the superior T5 and T6   endplates. No evidence of visceral organ injury.      < end of copied text >    < from: TTE Echo Complete w/o Contrast w/ Doppler (01.19.22 @ 08:38) >   Summary     The left ventricle is normal in size.   Moderately reduced left ventricular systolic function.   Estimated left ventricular ejection fraction is 40-45 %.   Overall LV dysfunction appears global.   Normal appearing right ventricle structure and function.   Normal appearing mitral valve structure and function.   Mild (1+) mitral regurgitation is present.   Normal appearing tricuspid valve structure and function.   Trace tricuspid valve regurgitation is present.   No evidence of pericardial effusion.     Signature     ----------------------------------------------------------------   Electronically signed by Jamie Pastrana DO(Interpreting   physician) on 01/19/2022 10:04 AM    < end of copied text >  ==============================

## 2022-01-25 NOTE — PROGRESS NOTE ADULT - SUBJECTIVE AND OBJECTIVE BOX
HPI:  HPI: The patient a  42 y/o F PMx significant for MRSA bacteremia, COPD, Hypertension, ESRD on hemodialysis (MWF) (kidney bx c/w ANCA vasculitis), cocaine and heroin abuse, depression, GERD, epilepsy, Raynaud's syndrome, Rheumatoid arthritis, SLE, seizure disorder, hospitalized 12/15/21 through 12/18/21 for seizure, presented in the ER via EMS  for AMS. The patient family found her on the floor unresponsive after they lleft her alone for an hour. The patient respond only to painful stimulation. Patient was intubated for a Likely aspiration PNA.      1/24: Patient seen in bed intubated.  She is weaning well.  S/P HD today  1/25: Patient doing well extubated.  No HD today      PAST MEDICAL & SURGICAL HISTORY:  Rheumatoid arthritis    H/O Raynaud&#x27;s syndrome    Heroin abuse    Smoker    Sepsis    HTN (hypertension)    COPD (chronic obstructive pulmonary disease)    Depression    Epilepsy    GERD (gastroesophageal reflux disease)    Bacteremia    Systemic lupus erythematosus    Aphonia    H/O tubal ligation    H/O appendicitis    Gall bladder stones    H/O tracheostomy    S/P percutaneous endoscopic gastrostomy (PEG) tube placement        FAMILY HISTORY:  Family history of cardiomyopathy (Mother)        Social Hx:    Allergies    morphine (Rash)    Intolerances            ICU Vital Signs Last 24 Hrs  T(C): 35.7 (25 Jan 2022 05:10), Max: 36.6 (24 Jan 2022 23:48)  T(F): 96.3 (25 Jan 2022 05:10), Max: 97.9 (24 Jan 2022 23:48)  HR: 122 (25 Jan 2022 06:00) (99 - 122)  BP: 121/78 (25 Jan 2022 06:00) (112/72 - 168/99)  BP(mean): 86 (25 Jan 2022 06:00) (81 - 129)  ABP: --  ABP(mean): --  RR: 22 (25 Jan 2022 06:00) (10 - 43)  SpO2: 100% (25 Jan 2022 06:00) (95% - 100%)          I&O's Summary    24 Jan 2022 07:01  -  25 Jan 2022 07:00  --------------------------------------------------------  IN: 1353 mL / OUT: 2500 mL / NET: -1147 mL                              11.4   17.51 )-----------( 403      ( 25 Jan 2022 07:03 )             36.1       01-25    134<L>  |  101  |  34<H>  ----------------------------<  100<H>  3.0<L>   |  15<L>  |  4.82<H>    Ca    9.1      25 Jan 2022 07:03  Phos  4.6     01-25  Mg     1.9     01-25    TPro  x   /  Alb  2.3<L>  /  TBili  x   /  DBili  x   /  AST  x   /  ALT  x   /  AlkPhos  x   01-24                    MEDICATIONS  (STANDING):  artificial  tears Solution 1 Drop(s) Both EYES two times a day  atovaquone  Suspension 1500 milliGRAM(s) Oral daily  carvedilol 3.125 milliGRAM(s) Oral every 12 hours  fosphenytoin IVPB 200 milliGRAM(s) PE IV Intermittent <User Schedule>  heparin   Injectable 5000 Unit(s) SubCutaneous every 8 hours  hydroxychloroquine 200 milliGRAM(s) Oral daily  levETIRAcetam  IVPB 500 milliGRAM(s) IV Intermittent at bedtime  methylPREDNISolone sodium succinate Injectable 50 milliGRAM(s) IV Push daily  metoclopramide Injectable 10 milliGRAM(s) IV Push every 6 hours  pantoprazole  Injectable 40 milliGRAM(s) IV Push two times a day  piperacillin/tazobactam IVPB.. 3.375 Gram(s) IV Intermittent every 12 hours  vancomycin    Solution 125 milliGRAM(s) Oral every 6 hours    MEDICATIONS  (PRN):  acetaminophen  Suppository .. 650 milliGRAM(s) Rectal every 6 hours PRN Temp greater or equal to 38C (100.4F)  albumin human 25% IVPB 50 milliLiter(s) IV Intermittent every 1 hour PRN SBP less than 100  ALBUTerol    90 MICROgram(s) HFA Inhaler 2 Puff(s) Inhalation every 6 hours PRN Shortness of Breath and/or Wheezing  aluminum hydroxide/magnesium hydroxide/simethicone Suspension 30 milliLiter(s) Oral every 4 hours PRN Dyspepsia  hydrALAZINE Injectable 10 milliGRAM(s) IV Push every 6 hours PRN SBP>140 or MAP>100  levETIRAcetam  IVPB 250 milliGRAM(s) IV Intermittent daily PRN After dialysis  LORazepam   Injectable 2 milliGRAM(s) IV Push every 2 hours PRN Agitation  melatonin 3 milliGRAM(s) Oral at bedtime PRN Insomnia  metoprolol tartrate Injectable 2.5 milliGRAM(s) IV Push every 6 hours PRN HR.130 sustained  ondansetron Injectable 4 milliGRAM(s) IV Push every 8 hours PRN Nausea and/or Vomiting  simethicone 80 milliGRAM(s) Chew three times a day PRN Gas      DVT Prophylaxis: H    Advanced Directives:  Discussed with:    Visit Information:     ** Time is exclusive of billed procedures and/or teaching and/or routine family updates.

## 2022-01-25 NOTE — PROGRESS NOTE ADULT - ASSESSMENT
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  on HD MWF. Recent admission for MRSA bacteremia - permcath was replaced . 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

## 2022-01-25 NOTE — PROGRESS NOTE ADULT - ASSESSMENT
A/P: 41 year old female with Acute Hypoxic Respiratory Failure from a LLL Pneumonia  ESRD on HD  RA  Seizure disorder  IVDA    Plan:  CCU    PULM: Continue NC O2, CXR reviewed and LLL ATX/PNA has improved    Cardio: Hemodynamics reasonable    Renal: HD as per renal    GI: PO DIet    ID: Continue Vanco and Zosyn for PNA, Mepron for PCP prophylaxis     Neuro: Negative LP, continue Keppra for seizure disorder    SQH DVT Prophylaxis    Transfer to reg floor  Called into the Hospitalist at 11:51AM awaiting a call back

## 2022-01-25 NOTE — PROGRESS NOTE ADULT - SUBJECTIVE AND OBJECTIVE BOX
Date of service: 22 @ 10:07    pt seen and examined  extubated  feels better  laying in bed, nad  loose stools       ROS: unable to obtain d/t medical condition     MEDICATIONS  (STANDING):  artificial  tears Solution 1 Drop(s) Both EYES two times a day  atovaquone  Suspension 1500 milliGRAM(s) Oral daily  carvedilol 3.125 milliGRAM(s) Oral every 12 hours  fosphenytoin IVPB 200 milliGRAM(s) PE IV Intermittent <User Schedule>  heparin   Injectable 5000 Unit(s) SubCutaneous every 8 hours  hydroxychloroquine 200 milliGRAM(s) Oral daily  levETIRAcetam  IVPB 500 milliGRAM(s) IV Intermittent at bedtime  methylPREDNISolone sodium succinate Injectable 50 milliGRAM(s) IV Push daily  metoclopramide Injectable 10 milliGRAM(s) IV Push every 6 hours  pantoprazole  Injectable 40 milliGRAM(s) IV Push two times a day  piperacillin/tazobactam IVPB.. 3.375 Gram(s) IV Intermittent every 12 hours  vancomycin    Solution 125 milliGRAM(s) Oral every 6 hours    Vital Signs Last 24 Hrs  T(C): 35.7 (2022 05:10), Max: 37 (2022 11:16)  T(F): 96.3 (2022 05:10), Max: 98.6 (2022 11:16)  HR: 122 (2022 06:00) (99 - 122)  BP: 121/78 (2022 06:00) (112/72 - 168/99)  BP(mean): 86 (2022 06:00) (81 - 130)  RR: 22 (2022 06:00) (10 - 43)  SpO2: 100% (2022 06:00) (95% - 100%)      PE:  Constitutional: NAD   HEENT: NC/AT, EOMI, PERRLA, conjunctivae clear; ears and nose atraumatic; pharynx benign  Neck: supple; thyroid not palpable  Back: no tenderness  Respiratory: decreased breath sounds   Cardiovascular: S1S2 regular, no murmurs  Abdomen: soft, not tender, not distended, positive BS; liver and spleen WNL  Genitourinary: no suprapubic tenderness  Lymphatic: no LN palpable  Musculoskeletal: no muscle tenderness, no joint swelling or tenderness  Extremities: no pedal edema  Neurological/ Psychiatric: no focal deficits  Skin: no rashes; no palpable lesions perm catheter in place    Labs: all available labs reviewed                                              11.4   17.51 )-----------( 403      ( 2022 07:03 )             36.1         134<L>  |  101  |  34<H>  ----------------------------<  100<H>  3.0<L>   |  15<L>  |  4.82<H>    Ca    9.1      2022 07:03  Phos  4.6       Mg     1.9         TPro  x   /  Alb  2.3<L>  /  TBili  x   /  DBili  x   /  AST  x   /  ALT  x   /  AlkPhos  x           LIVER FUNCTIONS - ( 2022 14:06 )  Alb: 2.9 g/dL / Pro: 8.5 gm/dL / ALK PHOS: 162 U/L / ALT: 19 U/L / AST: 24 U/L / GGT: x           Urinalysis Basic - ( 2022 14:06 )    Color: Yellow / Appearance: Slightly Turbid / S.010 / pH: x  Gluc: x / Ketone: Trace  / Bili: Negative / Urobili: Negative   Blood: x / Protein: 500 mg/dL / Nitrite: Negative   Leuk Esterase: Trace / RBC: 25-50 /HPF / WBC 6-10   Sq Epi: x / Non Sq Epi: Occasional / Bacteria: Few    Culture - Blood (22 @ 17:17)   Specimen Source: .Blood None   Culture Results:   No growth to date.   Culture - Blood (22 @ 14:06)   Specimen Source: .Blood Blood-Peripheral   Culture Results:   No growth to date.   Culture - CSF with Gram Stain . (22 @ 14:32)   Gram Stain:   No polymorphonuclear cells seen   No organisms seen   by cytocentrifuge   Specimen Source: .CSF CSF       Radiology: all available radiological tests reviewed  < from: Xray Chest 1 View- PORTABLE-Urgent (Xray Chest 1 View- PORTABLE-Urgent .) (22 @ 07:17) >    ACC: [id number] EXAM:  XR CHEST PORTABLE URGENT 1V                          PROCEDURE DATE:  2022          INTERPRETATION:  Chest one view    HISTORY: NG tube placement    COMPARISON STUDY: 2022    Frontal view of the chest shows the heartto be normal in size. Right   dialysis catheter tip projects in the lower right atrium. Endotracheal   tube reaches the lower half of the trachea. Nasogastric tube reaches the   central stomach. Right upper quadrant clips are again noted.    The lungs show clear right lung with small left effusion and there is no   evidence of pneumothorax nor right pleural effusion.    IMPRESSION:  NG tube to stomach.      < end of copied text >    ACC: [id number] EXAM:  CT ABDOMEN AND PELVIS                        ACC: [id number] EXAM:  CT CHEST                          PROCEDURE DATE:  2022          INTERPRETATION:  CLINICAL INFORMATION: Status post fall.    COMPARISON: Noncontrast CT of the thorax dated 2021 and   noncontrast CT of the abdomen and pelvis dated 2021.    CONTRAST/COMPLICATIONS:  IV Contrast: NONE  Oral Contrast: NONE  Complications: None reported at time of study completion    PROCEDURE:  CT of the Chest, Abdomen and Pelvis was performed.  Sagittal and coronal reformats were performed.    FINDINGS:  CHEST:  LUNGS AND LARGE AIRWAYS: Patent central airways. Trace tree-in-bud   nodularity in the right upper lobe posterolaterally, compatible with   terminal bronchiolitis. Minimal dependent atelectasis in the left lower   lobe.  PLEURA: No pleural effusion. No pneumothorax or pneumomediastinum.  VESSELS: Right internal jugular dialysis catheter extending to the right   atrial/IVC junction. Within normal limits.  HEART: Heart size is normal. No pericardial effusion.  MEDIASTINUM AND MARY JANE: No lymphadenopathy. Shotty bilateral axillary and   subpectoral lymph nodes.  CHEST WALL AND LOWER NECK: Within normal limits.    ABDOMEN AND PELVIS:  LIVER: Within normal limits.  BILE DUCTS: Normal caliber.  GALLBLADDER: Removed.  SPLEEN: Within normal limits.  PANCREAS: Within normal limits.  ADRENALS: Within normal limits.  KIDNEYS/URETERS: Moderate bilateral perinephric stranding. Otherwise,   within normal limits.    BLADDER: Collapsed.  REPRODUCTIVE ORGANS: Uterus and adnexa within normal limits.    BOWEL: Fluid-filled stomach and proximal duodenum. There is mild diffuse   fluid distention of small bowel loops. The colon is predominantly   collapsed. No bowel obstruction.  PERITONEUM: No ascites.  VESSELS: Within normal limits.  RETROPERITONEUM/LYMPH NODES: Again are noted enlarged left para-aortic   lymph nodes measuring up to 2.2 x 1.7 cm on image 93.  ABDOMINAL WALL: Within normal limits.  BONES: New subtle compression deformities of the superior T5 and T6   endplates. Bilateral shoulder effusions. Erosive changes of the left   humeral head. Degenerative joint space narrowing of the right hip.    IMPRESSION: New subtle compression deformities of the superior T5 and T6   endplates. No evidence of visceral organ injury.          Advanced directives addressed: full resuscitation

## 2022-01-26 LAB
ANION GAP SERPL CALC-SCNC: 16 MMOL/L — SIGNIFICANT CHANGE UP (ref 5–17)
BUN SERPL-MCNC: 66 MG/DL — HIGH (ref 7–23)
CALCIUM SERPL-MCNC: 8.3 MG/DL — LOW (ref 8.5–10.1)
CHLORIDE SERPL-SCNC: 101 MMOL/L — SIGNIFICANT CHANGE UP (ref 96–108)
CO2 SERPL-SCNC: 17 MMOL/L — LOW (ref 22–31)
CREAT SERPL-MCNC: 5.88 MG/DL — HIGH (ref 0.5–1.3)
GLUCOSE BLDC GLUCOMTR-MCNC: 146 MG/DL — HIGH (ref 70–99)
GLUCOSE SERPL-MCNC: 109 MG/DL — HIGH (ref 70–99)
HCT VFR BLD CALC: 30.3 % — LOW (ref 34.5–45)
HGB BLD-MCNC: 9.6 G/DL — LOW (ref 11.5–15.5)
MAGNESIUM SERPL-MCNC: 2 MG/DL — SIGNIFICANT CHANGE UP (ref 1.6–2.6)
MCHC RBC-ENTMCNC: 28.6 PG — SIGNIFICANT CHANGE UP (ref 27–34)
MCHC RBC-ENTMCNC: 31.7 GM/DL — LOW (ref 32–36)
MCV RBC AUTO: 90.2 FL — SIGNIFICANT CHANGE UP (ref 80–100)
PHOSPHATE SERPL-MCNC: 4.9 MG/DL — HIGH (ref 2.5–4.5)
PLATELET # BLD AUTO: 375 K/UL — SIGNIFICANT CHANGE UP (ref 150–400)
POTASSIUM SERPL-MCNC: 3.2 MMOL/L — LOW (ref 3.5–5.3)
POTASSIUM SERPL-SCNC: 3.2 MMOL/L — LOW (ref 3.5–5.3)
RBC # BLD: 3.36 M/UL — LOW (ref 3.8–5.2)
RBC # FLD: 14.5 % — SIGNIFICANT CHANGE UP (ref 10.3–14.5)
SARS-COV-2 RNA SPEC QL NAA+PROBE: SIGNIFICANT CHANGE UP
SODIUM SERPL-SCNC: 134 MMOL/L — LOW (ref 135–145)
WBC # BLD: 10.64 K/UL — HIGH (ref 3.8–10.5)
WBC # FLD AUTO: 10.64 K/UL — HIGH (ref 3.8–10.5)

## 2022-01-26 PROCEDURE — 99233 SBSQ HOSP IP/OBS HIGH 50: CPT

## 2022-01-26 RX ORDER — POTASSIUM CHLORIDE 20 MEQ
40 PACKET (EA) ORAL ONCE
Refills: 0 | Status: COMPLETED | OUTPATIENT
Start: 2022-01-26 | End: 2022-01-26

## 2022-01-26 RX ORDER — ERYTHROPOIETIN 10000 [IU]/ML
10000 INJECTION, SOLUTION INTRAVENOUS; SUBCUTANEOUS
Refills: 0 | Status: DISCONTINUED | OUTPATIENT
Start: 2022-01-26 | End: 2022-02-02

## 2022-01-26 RX ADMIN — Medication 40 MILLIEQUIVALENT(S): at 07:55

## 2022-01-26 RX ADMIN — Medication 200 MILLIGRAM(S): at 16:49

## 2022-01-26 RX ADMIN — Medication 125 MILLIGRAM(S): at 00:42

## 2022-01-26 RX ADMIN — ATOVAQUONE 1500 MILLIGRAM(S): 750 SUSPENSION ORAL at 16:49

## 2022-01-26 RX ADMIN — Medication 10 MILLIGRAM(S): at 05:55

## 2022-01-26 RX ADMIN — HEPARIN SODIUM 5000 UNIT(S): 5000 INJECTION INTRAVENOUS; SUBCUTANEOUS at 16:49

## 2022-01-26 RX ADMIN — FOSPHENYTOIN 108 MILLIGRAM(S) PE: 50 INJECTION INTRAMUSCULAR; INTRAVENOUS at 21:02

## 2022-01-26 RX ADMIN — LEVETIRACETAM 400 MILLIGRAM(S): 250 TABLET, FILM COATED ORAL at 21:02

## 2022-01-26 RX ADMIN — CARVEDILOL PHOSPHATE 3.12 MILLIGRAM(S): 80 CAPSULE, EXTENDED RELEASE ORAL at 11:35

## 2022-01-26 RX ADMIN — Medication 125 MILLIGRAM(S): at 05:56

## 2022-01-26 RX ADMIN — CARVEDILOL PHOSPHATE 3.12 MILLIGRAM(S): 80 CAPSULE, EXTENDED RELEASE ORAL at 21:02

## 2022-01-26 RX ADMIN — ERYTHROPOIETIN 10000 UNIT(S): 10000 INJECTION, SOLUTION INTRAVENOUS; SUBCUTANEOUS at 13:49

## 2022-01-26 RX ADMIN — Medication 50 MILLIGRAM(S): at 05:55

## 2022-01-26 RX ADMIN — HEPARIN SODIUM 5000 UNIT(S): 5000 INJECTION INTRAVENOUS; SUBCUTANEOUS at 21:02

## 2022-01-26 RX ADMIN — Medication 10 MILLIGRAM(S): at 00:42

## 2022-01-26 RX ADMIN — PANTOPRAZOLE SODIUM 40 MILLIGRAM(S): 20 TABLET, DELAYED RELEASE ORAL at 16:49

## 2022-01-26 RX ADMIN — HEPARIN SODIUM 5000 UNIT(S): 5000 INJECTION INTRAVENOUS; SUBCUTANEOUS at 05:56

## 2022-01-26 RX ADMIN — Medication 2.5 MILLIGRAM(S): at 15:41

## 2022-01-26 RX ADMIN — Medication 1 DROP(S): at 13:39

## 2022-01-26 RX ADMIN — Medication 2.5 MILLIGRAM(S): at 23:27

## 2022-01-26 RX ADMIN — Medication 3 MILLIGRAM(S): at 21:02

## 2022-01-26 RX ADMIN — PANTOPRAZOLE SODIUM 40 MILLIGRAM(S): 20 TABLET, DELAYED RELEASE ORAL at 21:04

## 2022-01-26 NOTE — PROGRESS NOTE ADULT - ASSESSMENT
42 y/o F PMx significant for MRSA bacteremia, COPD, Hypertension, ESRD on hemodialysis (MWF) (kidney bx c/w ANCA vasculitis), cocaine and heroin abuse, depression, GERD, epilepsy, Raynaud's syndrome, Rheumatoid arthritis, SLE, seizure disorder, hospitalized 12/15/21 through 12/18/21 for seizure, presented in the ER via EMS  for AMS. The patient family found her on the floor unresponsive after they lleft her alone for an hour. The patient respond only to painful stimulation. Patient was intubated for a Likely aspiration PNA.      1. acute hypoxic resp failure due to PNA  - CXR : LLL infiltrate  - continue vanco / zosyn  - mepron for PCP ppx  - discontinue steroids     2. ESRD on hd  - HD per nephro    3. seizure history  - continue keppra    4. Rheum arth  - continue plaquenil    5. HTN  - continue coreg  - cntinue hydralazine    - continue     DVT ppx : heparin     code: FULL

## 2022-01-26 NOTE — CHART NOTE - NSCHARTNOTEFT_GEN_A_CORE
Clinical Nutrition Follow Up Note:    *42 y/o F PMx significant for MRSA bacteremia, COPD, Hypertension, ESRD on hemodialysis (MWF) (kidney bx c/w ANCA vasculitis), cocaine and heroin abuse, depression, GERD, epilepsy, Raynaud's syndrome, Rheumatoid arthritis, SLE, seizure disorder, hospitalized 12/15/21 through 12/18/21 for seizure, presented in the ER via EMS  for AMS. The patient family found her on the floor unresponsive after they left her alone for an hour. The patient respond only to painful stimulation. Patient was intubated for a likely aspiration PNA.      *Current status:  acute hypoxic resp failure due to PNA- CXR : LLL infiltrate- continue vanco / zosyn. ESRD on HD. Pt remains with poor intake and requesting liberalized diet. Labs reviewed: hypokalemia-supplementing. Phosphorus slightly elevated. + ESRD/HD  however due to significant weight loss, poor intake, and severe muscle/fat wasting liberalizing diet is warranted.       *Pertinent Meds:     *Labs Reviewed:  01-26    134<L>  |  101  |  66<H>  ----------------------------<  109<H>  3.2<L>   |  17<L>  |  5.88<H>    Ca    8.3<L>      26 Jan 2022 06:21  Phos  4.9     01-26  Mg     2.0     01-26        BMI: BMI (kg/m2): 17.5 (01-19-22 @ 18:39)  HbA1c:   Glucose: POCT Blood Glucose.: 77 mg/dL (01-18-22 @ 13:56)    BP: 122/91 (01-26-22 @ 14:30) (109/77 - 168/99)  Lipid Panel: Date/Time: 10-12-21 @ 09:54  Cholesterol, Serum: 137  Direct LDL: --  HDL Cholesterol, Serum: 14  Total Cholesterol/HDL Ration Measurement: --  Triglycerides, Serum: 470      *I and O's:    01-25-22 @ 07:01  -  01-26-22 @ 07:00  --------------------------------------------------------  IN:    Oral Fluid: 1240 mL  Total IN: 1240 mL    OUT:  Total OUT: 0 mL    Total NET: 1240 mL          *(+) BM on 1/26 x 2 ; pt on no bowel regimen.    *john paul score of  13: sacral ulcer unstageable documented.   +1 generalized edema documented.    *PO intake, meeting ~ <50% of estimated nutr needs.    *Malnutrition dx:  severe protein/calorie malnutrition in context of acute on chronic illness r/t inability to meet ENN 2/2 sepsis AEB severe/moderate muscle/fat wasting, <50% of ENN x > 5 days, and significant weight loss x 6 weeks (11%)      Recommendations:  1) liberalize diet to regular with no restrictions for optimal nutrition intake  2) Nepro 8oz po TID (in between meals)  3) monitor lytes and hydration replete prn  4) Maintain aspiration precautions, back of bed >35 degrees.   5) monitor daily weights track trends              Diet, Regular:   Supplement Feeding Modality:  Oral  Nepro Cans or Servings Per Day:  1  Frequency:  Two Times a day (01-26-22 @ 09:10) [Pending Verification By Attending]  Diet, Renal Restrictions:   For patients receiving Renal Replacement - No Protein Restr, No Conc K, No Conc Phos, Low Sodium (01-25-22 @ 07:48) [Active]      Estimated Needs: Based on 41Kg (wt from 1/26)  Calories:  1230-1435Kcal (30-35 Kcal/Kg)  Protein: 74-82 g (1.8-2.0 g/Kg)  Fluids: 1000  mL (25 mL/Kg)    *Wt Hx: 1/26-90#              10/21/# (last hospital admission weight)    **Significant weight loss x 3 months- 20#/18% of body weight. Weight loss 2/2 long term suboptimal po intake.     Height (cm): 165.1 (01-18-22 @ 13:55)  Weight (kg): 47.6 (01-19-22 @ 18:39)  BMI (kg/m2): 17.5 (01-19-22 @ 18:39)  BSA (m2): 1.5 (01-19-22 @ 18:39)    *will continue to monitor and follow up with adjustments to treatment plan prn*    *Imelda Miller RD, CDN   Cell# [phone]   Office# [phone]

## 2022-01-26 NOTE — PROGRESS NOTE ADULT - ASSESSMENT
40 y/o F PMx significant for MRSA bacteremia, COPD, Hypertension, ESRD on hemodialysis (MWF) (kidney bx c/w ANCA vasculitis), cocaine and heroin abuse, depression, GERD, epilepsy, Raynaud's syndrome, Rheumatoid arthritis, SLE, seizure disorder, hospitalized 12/15/21 through 12/18/21 for seizure, presented in the ER via EMS  for AMS. The patient family found her on the floor unresponsive after they left her alone for an hour. The patient respond only to painful stimulation. The patient was evaluated by ICU practitioner and recommended CICU admission. Here noted with elevated wbc ct 29, LA 5.7, imaging remarkable for new subtle compression deformities of superior t5/t6 endplates, RUL bronchiolitis, LLL atelectasis, UA with pyuria, b/l perinephric stranding noted on CT, was given IV vanco/zosyn, is on steroids, on mepron for pcp prophlyaxis.     1. sepsis. aspiration pneumonia. ESRD. ANCA vasculitis. bronchiolitis. COPD. substance abuse. hx cdad   - imaging reviewed, wbc ct improved - 10  - LP results reviewed, CSF PCR (-), CSF cx (-) no evidence of CNS infection   - on mepron for pcp prophylaxis  - completed 7 days vancomycin/zosyn for pneumonia  - c diff negative, on oral vancomycin [id number] for c diff prophylaxis while on abx  - since abx discontinued will stop oral vanco  - repeat xray 1/24 reviewed   - blood cx, urine cx no growth   - monitor temps  - tolerating abx well so far; no side effects noted  - reason for abx use and side effects reviewed with patient  - supportive care  - fu cbc    2. other issues - care per medicine

## 2022-01-26 NOTE — PROGRESS NOTE ADULT - SUBJECTIVE AND OBJECTIVE BOX
Patient is a 41y Female who seen on HD earler today    alert and having diarrhea - due to tube feeds    min fluid removal due to tachycardia         MEDICATIONS  (STANDING):  artificial  tears Solution 1 Drop(s) Both EYES two times a day  atovaquone  Suspension 1500 milliGRAM(s) Oral daily  carvedilol 3.125 milliGRAM(s) Oral every 12 hours  epoetin amee-epbx (RETACRIT) Injectable 18087 Unit(s) IV Push <User Schedule>  fosphenytoin IVPB 200 milliGRAM(s) PE IV Intermittent <User Schedule>  hydroxychloroquine 200 milliGRAM(s) Oral daily  levETIRAcetam  IVPB 500 milliGRAM(s) IV Intermittent at bedtime  pantoprazole Infusion 8 mG/Hr (10 mL/Hr) IV Continuous <Continuous>          T(C): , Max: 36.6 (01-24-22 @ 23:48)  T(F): , Max: 97.9 (01-24-22 @ 23:48)  HR: 122 (01-25-22 @ 06:00)  BP: 121/78 (01-25-22 @ 06:00)  BP(mean): 86 (01-25-22 @ 06:00)  RR: 22 (01-25-22 @ 06:00)  SpO2: 100% (01-25-22 @ 06:00)  Wt(kg): --    01-24 @ 07:01  -  01-25 @ 07:00  --------------------------------------------------------  IN: 1353 mL / OUT: 2500 mL / NET: -1147 mL        PHYSICAL EXAM:    Constitutional: frail, >>stated age  HEENT:dry MM  Neck: No LAD, No JVD  Respiratory: scatt olga  Cardiovascular: S1 and S2   Extremities: chronic change/peripheral edema  Neurological: Alert   hd cath        LABS        134    |  101    |  66     ----------------------------<  109       26 Jan 2022 06:21  3.2     |  17     |  5.88         Phos  4.9       26 Jan 2022 06:21    Mg     2.0       26 Jan 2022 06:21    TPro  x      /  Alb  2.3    /  TBili  x      /        24 Jan 2022 07:41  DBili  x      /  AST  x      /  ALT  x      /  AlkPhos  x                Urine Studies:          RADIOLOGY & ADDITIONAL STUDIES:

## 2022-01-26 NOTE — PROGRESS NOTE ADULT - SUBJECTIVE AND OBJECTIVE BOX
Date of service: 22 @ 10:32    pt seen and examined  has loose stools  laying in bed  weak appearing  no resp distress     ROS: unable to obtain d/t medical condition     MEDICATIONS  (STANDING):  artificial  tears Solution 1 Drop(s) Both EYES two times a day  atovaquone  Suspension 1500 milliGRAM(s) Oral daily  carvedilol 3.125 milliGRAM(s) Oral every 12 hours  epoetin amee-epbx (RETACRIT) Injectable 84530 Unit(s) IV Push <User Schedule>  fosphenytoin IVPB 200 milliGRAM(s) PE IV Intermittent <User Schedule>  heparin   Injectable 5000 Unit(s) SubCutaneous every 8 hours  hydroxychloroquine 200 milliGRAM(s) Oral daily  levETIRAcetam  IVPB 500 milliGRAM(s) IV Intermittent at bedtime  metoclopramide Injectable 10 milliGRAM(s) IV Push every 6 hours  pantoprazole  Injectable 40 milliGRAM(s) IV Push two times a day    Vital Signs Last 24 Hrs  T(C): 36.4 (2022 06:12), Max: 37.4 (2022 22:00)  T(F): 97.5 (2022 06:12), Max: 99.4 (2022 22:00)  HR: 118 (2022 08:00) (97 - 128)  BP: 142/100 (2022 08:00) (109/77 - 164/96)  BP(mean): 110 (2022 08:00) (85 - 111)  RR: 21 (2022 08:00) (20 - 43)  SpO2: 99% (2022 05:00) (99% - 100%)      PE:  Constitutional: NAD   HEENT: NC/AT, EOMI, PERRLA, conjunctivae clear; ears and nose atraumatic; pharynx benign  Neck: supple; thyroid not palpable  Back: no tenderness  Respiratory: decreased breath sounds   Cardiovascular: S1S2 regular, no murmurs  Abdomen: soft, not tender, not distended, positive BS; liver and spleen WNL  Genitourinary: no suprapubic tenderness  Lymphatic: no LN palpable  Musculoskeletal: no muscle tenderness, no joint swelling or tenderness  Extremities: no pedal edema  Neurological/ Psychiatric: no focal deficits  Skin: no rashes; no palpable lesions perm catheter in place    Labs: all available labs reviewed                                              9.6    10.64 )-----------( 375      ( 2022 06:21 )             30.3         134<L>  |  101  |  66<H>  ----------------------------<  109<H>  3.2<L>   |  17<L>  |  5.88<H>    Ca    8.3<L>      2022 06:21  Phos  4.9       Mg     2.0               LIVER FUNCTIONS - ( 2022 14:06 )  Alb: 2.9 g/dL / Pro: 8.5 gm/dL / ALK PHOS: 162 U/L / ALT: 19 U/L / AST: 24 U/L / GGT: x           Urinalysis Basic - ( 2022 14:06 )    Color: Yellow / Appearance: Slightly Turbid / S.010 / pH: x  Gluc: x / Ketone: Trace  / Bili: Negative / Urobili: Negative   Blood: x / Protein: 500 mg/dL / Nitrite: Negative   Leuk Esterase: Trace / RBC: 25-50 /HPF / WBC 6-10   Sq Epi: x / Non Sq Epi: Occasional / Bacteria: Few    Culture - Blood (22 @ 17:17)   Specimen Source: .Blood None   Culture Results:   No growth to date.   Culture - Blood (22 @ 14:06)   Specimen Source: .Blood Blood-Peripheral   Culture Results:   No growth to date.   Culture - CSF with Gram Stain . (22 @ 14:32)   Gram Stain:   No polymorphonuclear cells seen   No organisms seen   by cytocentrifuge   Specimen Source: .CSF CSF     Radiology: all available radiological tests reviewed      ACC: [id number] EXAM:  XR CHEST PORTABLE URGENT 1V                          PROCEDURE DATE:  2022          INTERPRETATION:  Chest one view    HISTORY: NG tube placement    COMPARISON STUDY: 2022    Frontal view of the chest shows the heartto be normal in size. Right   dialysis catheter tip projects in the lower right atrium. Endotracheal   tube reaches the lower half of the trachea. Nasogastric tube reaches the   central stomach. Right upper quadrant clips are again noted.    The lungs show clear right lung with small left effusion and there is no   evidence of pneumothorax nor right pleural effusion.    IMPRESSION:  NG tube to stomach.      < end of copied text >    ACC: [id number] EXAM:  CT ABDOMEN AND PELVIS                        ACC: [id number] EXAM:  CT CHEST                          PROCEDURE DATE:  2022          INTERPRETATION:  CLINICAL INFORMATION: Status post fall.    COMPARISON: Noncontrast CT of the thorax dated 2021 and   noncontrast CT of the abdomen and pelvis dated 2021.    CONTRAST/COMPLICATIONS:  IV Contrast: NONE  Oral Contrast: NONE  Complications: None reported at time of study completion    PROCEDURE:  CT of the Chest, Abdomen and Pelvis was performed.  Sagittal and coronal reformats were performed.    FINDINGS:  CHEST:  LUNGS AND LARGE AIRWAYS: Patent central airways. Trace tree-in-bud   nodularity in the right upper lobe posterolaterally, compatible with   terminal bronchiolitis. Minimal dependent atelectasis in the left lower   lobe.  PLEURA: No pleural effusion. No pneumothorax or pneumomediastinum.  VESSELS: Right internal jugular dialysis catheter extending to the right   atrial/IVC junction. Within normal limits.  HEART: Heart size is normal. No pericardial effusion.  MEDIASTINUM AND MARY JANE: No lymphadenopathy. Shotty bilateral axillary and   subpectoral lymph nodes.  CHEST WALL AND LOWER NECK: Within normal limits.    ABDOMEN AND PELVIS:  LIVER: Within normal limits.  BILE DUCTS: Normal caliber.  GALLBLADDER: Removed.  SPLEEN: Within normal limits.  PANCREAS: Within normal limits.  ADRENALS: Within normal limits.  KIDNEYS/URETERS: Moderate bilateral perinephric stranding. Otherwise,   within normal limits.    BLADDER: Collapsed.  REPRODUCTIVE ORGANS: Uterus and adnexa within normal limits.    BOWEL: Fluid-filled stomach and proximal duodenum. There is mild diffuse   fluid distention of small bowel loops. The colon is predominantly   collapsed. No bowel obstruction.  PERITONEUM: No ascites.  VESSELS: Within normal limits.  RETROPERITONEUM/LYMPH NODES: Again are noted enlarged left para-aortic   lymph nodes measuring up to 2.2 x 1.7 cm on image 93.  ABDOMINAL WALL: Within normal limits.  BONES: New subtle compression deformities of the superior T5 and T6   endplates. Bilateral shoulder effusions. Erosive changes of the left   humeral head. Degenerative joint space narrowing of the right hip.    IMPRESSION: New subtle compression deformities of the superior T5 and T6   endplates. No evidence of visceral organ injury.          Advanced directives addressed: full resuscitation

## 2022-01-26 NOTE — PROGRESS NOTE ADULT - SUBJECTIVE AND OBJECTIVE BOX
1/26 : Patient has a week long stay in the ICU. She is now stable for transfer to floor. Patient has extensive history. She is not a good historian she can recall the year but not the president. She is wheelchair bound and has decubitus  ulcer. She is complaining of a sore throat

## 2022-01-26 NOTE — PROGRESS NOTE ADULT - SUBJECTIVE AND OBJECTIVE BOX
REASON FOR VISIT: Elevated Troponin    42 y/o F PMx significant for MRSA bacteremia, COPD, Hypertension, ESRD on hemodialysis (MWF) (kidney bx c/w ANCA vasculitis), cocaine and heroin abuse, depression, GERD, epilepsy, Raynaud's syndrome, Rheumatoid arthritis, SLE, seizure disorder, hospitalized 12/15/21 through 21 for seizure, presented in the ER on  via EMS  for AMS.     -Pt w leukocytosis, sinus tachycardia, high sensitivity trop of 286, elevated BNP, + cocaine and THC on Utox. Cardiology consulted for further evaluation of elevated trop/BNP.     : Overnight events noted, patient became hypoxic despite ventimask, gurgling secretions w elevated RR, obtunded. Patient was transferred to CCU and intubated. Became hypotensive before intubation requiring pressors, currently off pressors. Patient remains in CCU, intubated and sedated, tachy in the 120's at bedside, BP 97/72.   22:  patient is remain intubated , off of pressors , on o2 45%, febrile episodes   22: patient intubated , sedated   22:  Patient is intubated awake ,comfortable , smiles on talking , on 40% oxygen , tachycardic   hypertensive , coffee ground NGT  suction   : intubated orients, smiles.  22:  patient is extubated laying comfortable breathing while supine flat position , persistently tachycardic  denies sob or chest pain   :  extubated, no complaints    MEDICATIONS:  OUTPATIENT  Home Medications:  Imodium A-D 2 mg oral tablet: 1 tab(s) orally every 4 hours, As Needed (2022 22:40)  Silvadene 1% topical cream: Apply topically to affected area 2 times a day (2022 22:40)  Tylenol 325 mg oral tablet: 2 tab(s) orally every 4 hours, As Needed (2022 22:40)      INPATIENT  MEDICATIONS  (STANDING):  artificial  tears Solution 1 Drop(s) Both EYES two times a day  atovaquone  Suspension 1500 milliGRAM(s) Oral daily  carvedilol 3.125 milliGRAM(s) Oral every 12 hours  epoetin amee-epbx (RETACRIT) Injectable 24216 Unit(s) IV Push <User Schedule>  fosphenytoin IVPB 200 milliGRAM(s) PE IV Intermittent <User Schedule>  heparin   Injectable 5000 Unit(s) SubCutaneous every 8 hours  hydroxychloroquine 200 milliGRAM(s) Oral daily  levETIRAcetam  IVPB 500 milliGRAM(s) IV Intermittent at bedtime  pantoprazole  Injectable 40 milliGRAM(s) IV Push two times a day    MEDICATIONS  (PRN):  acetaminophen  Suppository .. 650 milliGRAM(s) Rectal every 6 hours PRN Temp greater or equal to 38C (100.4F)  albumin human 25% IVPB 50 milliLiter(s) IV Intermittent every 1 hour PRN SBP less than 100  ALBUTerol    90 MICROgram(s) HFA Inhaler 2 Puff(s) Inhalation every 6 hours PRN Shortness of Breath and/or Wheezing  aluminum hydroxide/magnesium hydroxide/simethicone Suspension 30 milliLiter(s) Oral every 4 hours PRN Dyspepsia  hydrALAZINE Injectable 10 milliGRAM(s) IV Push every 6 hours PRN SBP>140 or MAP>100  levETIRAcetam  IVPB 250 milliGRAM(s) IV Intermittent daily PRN After dialysis  LORazepam   Injectable 2 milliGRAM(s) IV Push every 2 hours PRN Agitation  melatonin 3 milliGRAM(s) Oral at bedtime PRN Insomnia  metoprolol tartrate Injectable 2.5 milliGRAM(s) IV Push every 6 hours PRN HR.130 sustained  ondansetron Injectable 4 milliGRAM(s) IV Push every 8 hours PRN Nausea and/or Vomiting  simethicone 80 milliGRAM(s) Chew three times a day PRN Gas    Vital Signs Last 24 Hrs  T(C): 36.1 (2022 14:45), Max: 37.4 (2022 22:00)  T(F): 97 (2022 14:45), Max: 99.4 (2022 22:00)  HR: 118 (2022 16:00) (97 - 136)  BP: 116/92 (2022 16:00) (109/77 - 164/96)  BP(mean): 97 (2022 16:00) (82 - 112)  RR: 18 (2022 16:00) (14 - 43)  SpO2: 97% (2022 15:00) (97% - 100%)Daily     Daily Weight in k.9 (2022 10:55)I&O's Summary    2022 07:01  -  2022 07:00  --------------------------------------------------------  IN: 1240 mL / OUT: 0 mL / NET: 1240 mL    2022 07:01  -  2022 17:50  --------------------------------------------------------  IN: 0 mL / OUT: 0 mL / NET: 0 mL      Physical Exam   Gen: sedated  vented   CV: sinus tachycardia, s1/s2, no M/R/G  Pulm: crackles resolving, currently intubated   Abd: normoactive bowel sounds in all 4 quadrants,   soft, nontender, nondistended, no rebound, no guarding, no masses  Extremities: warm, no pedal edema, pedal pulses palpable,   LLE contraction, uses wheelchair to ambulate previously  Skin: nl warm and dry, no wounds   Neuro: sedated         LABS: All Labs Reviewed:                        9.6    10.64 )-----------( 375      ( 2022 06:21 )             30.3                         11.4   17.51 )-----------( 403      ( 2022 07:03 )             36.1                         10.4   12.07 )-----------( 306      ( 2022 07:41 )             32.4     2022 06:21    134    |  101    |  66     ----------------------------<  109    3.2     |  17     |  5.88   2022 07:03    134    |  101    |  34     ----------------------------<  100    3.0     |  15     |  4.82   2022 14:35    133    |  100    |  21     ----------------------------<  88     3.1     |  17     |  3.29     Ca    8.3        2022 06:21  Ca    9.1        2022 07:03  Ca    8.9        2022 14:35  Phos  4.9       2022 06:21  Phos  4.6       2022 07:03  Phos  6.5       2022 07:41  Mg     2.0       2022 06:21  Mg     1.9       2022 07:03    TPro  x      /  Alb  2.3    /  TBili  x      /  DBili  x      /  AST  x      /  ALT  x      /  AlkPhos  x      2022 07:41        ===============================  < from: Xray Chest 1 View- PORTABLE-Urgent (Xray Chest 1 View- PORTABLE-Urgent .) (22 @ 15:13) >  IMPRESSION: Large right jugular line. Otherwise unremarkable study.    < end of copied text >      < from: CT Head No Cont (22 @ 15:44) >  IMPRESSION: No evidence of acute hemorrhage mass mass effect in posterior   fossa or supratentorial region.    No fracture or dislocation involving the cervical spine.    < end of copied text >    < from: CT Chest No Cont (22 @ 15:52) >  IMPRESSION: New subtle compression deformities of the superior T5 and T6   endplates. No evidence of visceral organ injury.      < end of copied text >    < from: TTE Echo Complete w/o Contrast w/ Doppler (22 @ 08:38) >   Summary     The left ventricle is normal in size.   Moderately reduced left ventricular systolic function.   Estimated left ventricular ejection fraction is 40-45 %.   Overall LV dysfunction appears global.   Normal appearing right ventricle structure and function.   Normal appearing mitral valve structure and function.   Mild (1+) mitral regurgitation is present.   Normal appearing tricuspid valve structure and function.   Trace tricuspid valve regurgitation is present.   No evidence of pericardial effusion.     Signature     ----------------------------------------------------------------   Electronically signed by Jamie Pastrana DO(Interpreting   physician) on 2022 10:04 AM    < end of copied text >  ==============================      Abhinav Kirby M.D.  Cardiology, [facility]  Cell: [phone]  Offices:    ([facility] Office)  [phone] ([facility] Office)

## 2022-01-26 NOTE — PROGRESS NOTE ADULT - ASSESSMENT
42 y/o F PMx significant for MRSA bacteremia, COPD, Hypertension, ESRD on hemodialysis (MWF) (kidney bx c/w ANCA vasculitis), cocaine and heroin abuse, depression, GERD, epilepsy, Raynaud's syndrome, Rheumatoid arthritis, SLE, seizure disorder, hospitalized 12/15/21 through 12/18/21 for seizure, presented in the ER on 01/18 via EMS  for AMS.     -Pt w leukocytosis, sinus tachycardia, high sensitivity trop of 286, elevated BNP, + cocaine and THC on Utox. Cardiology consulted for further evaluation of elevated trop/BNP. Patient now in CCU intubation, most likely 2/2 aspiration PNA and inability to protect airway.     #LV Dysfunction  ( New )  -Echo 1/19 demonstrated Moderately reduced left ventricular systolic function. Estimated left ventricular ejection fraction is 40-45 %. Overall LV dysfunction appears global.  -Changes worsened when compared to Echo from November, not present in Echo from October.   -Systolic dysfunction may be 2/2 acute sepsis picture, uncontrolled hypertension , may improve if patient improves.   -Will repeat echo as it has been 7 days since last one. if LV systolic function does not improve, patient may benefit from ischemic work-up  Cont. coreg 3.125mg po Q 12 hours.    #Sinus Tachycardia  -persistent sinus tachycardia clinically appear comfortable   , pulse in the 120's  -EKG notable for sinus tachy, no ST segment changes or heart blocks appreciated  -Most likely 2/2 sepsis/hypotension which is improved.  -Continue to monitor    #Elevated troponin  -High sensitivity trop 286  -Negligible. Most likely 2/2 Type II MI, demand injury due to cocaine abuse.   EKG notable for sinus tachy, no ST segment changes appreciated    #Elevated BNP  -Unclear clinical significance given ESRD on HD  -Not clinically fluid overloaded on physical exam

## 2022-01-26 NOTE — PROGRESS NOTE ADULT - ASSESSMENT
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  on HD MWF. Recent admission for MRSA bacteremia - permcath was replaced . 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

## 2022-01-27 LAB
ANION GAP SERPL CALC-SCNC: 11 MMOL/L — SIGNIFICANT CHANGE UP (ref 5–17)
ANION GAP SERPL CALC-SCNC: 11 MMOL/L — SIGNIFICANT CHANGE UP (ref 5–17)
BUN SERPL-MCNC: 49 MG/DL — HIGH (ref 7–23)
BUN SERPL-MCNC: 52 MG/DL — HIGH (ref 7–23)
CALCIUM SERPL-MCNC: 7.8 MG/DL — LOW (ref 8.5–10.1)
CALCIUM SERPL-MCNC: 7.9 MG/DL — LOW (ref 8.5–10.1)
CHLORIDE SERPL-SCNC: 104 MMOL/L — SIGNIFICANT CHANGE UP (ref 96–108)
CHLORIDE SERPL-SCNC: 105 MMOL/L — SIGNIFICANT CHANGE UP (ref 96–108)
CO2 SERPL-SCNC: 23 MMOL/L — SIGNIFICANT CHANGE UP (ref 22–31)
CO2 SERPL-SCNC: 26 MMOL/L — SIGNIFICANT CHANGE UP (ref 22–31)
CREAT SERPL-MCNC: 3.18 MG/DL — HIGH (ref 0.5–1.3)
CREAT SERPL-MCNC: 3.23 MG/DL — HIGH (ref 0.5–1.3)
GLUCOSE SERPL-MCNC: 170 MG/DL — HIGH (ref 70–99)
GLUCOSE SERPL-MCNC: 97 MG/DL — SIGNIFICANT CHANGE UP (ref 70–99)
HCT VFR BLD CALC: 20.6 % — CRITICAL LOW (ref 34.5–45)
HCT VFR BLD CALC: 21.8 % — LOW (ref 34.5–45)
HCT VFR BLD CALC: 26.1 % — LOW (ref 34.5–45)
HCT VFR BLD CALC: 26.4 % — LOW (ref 34.5–45)
HCT VFR BLD CALC: 26.8 % — LOW (ref 34.5–45)
HCT VFR BLD CALC: 27.6 % — LOW (ref 34.5–45)
HGB BLD-MCNC: 6.5 G/DL — CRITICAL LOW (ref 11.5–15.5)
HGB BLD-MCNC: 6.9 G/DL — CRITICAL LOW (ref 11.5–15.5)
HGB BLD-MCNC: 8.5 G/DL — LOW (ref 11.5–15.5)
HGB BLD-MCNC: 8.7 G/DL — LOW (ref 11.5–15.5)
HGB BLD-MCNC: 8.8 G/DL — LOW (ref 11.5–15.5)
HGB BLD-MCNC: 9.1 G/DL — LOW (ref 11.5–15.5)
LACTATE SERPL-SCNC: 2.3 MMOL/L — HIGH (ref 0.7–2)
MAGNESIUM SERPL-MCNC: 1.7 MG/DL — SIGNIFICANT CHANGE UP (ref 1.6–2.6)
MCHC RBC-ENTMCNC: 29.1 PG — SIGNIFICANT CHANGE UP (ref 27–34)
MCHC RBC-ENTMCNC: 29.1 PG — SIGNIFICANT CHANGE UP (ref 27–34)
MCHC RBC-ENTMCNC: 29.5 PG — SIGNIFICANT CHANGE UP (ref 27–34)
MCHC RBC-ENTMCNC: 29.5 PG — SIGNIFICANT CHANGE UP (ref 27–34)
MCHC RBC-ENTMCNC: 29.7 PG — SIGNIFICANT CHANGE UP (ref 27–34)
MCHC RBC-ENTMCNC: 29.8 PG — SIGNIFICANT CHANGE UP (ref 27–34)
MCHC RBC-ENTMCNC: 31.6 GM/DL — LOW (ref 32–36)
MCHC RBC-ENTMCNC: 31.7 GM/DL — LOW (ref 32–36)
MCHC RBC-ENTMCNC: 32.6 GM/DL — SIGNIFICANT CHANGE UP (ref 32–36)
MCHC RBC-ENTMCNC: 32.8 GM/DL — SIGNIFICANT CHANGE UP (ref 32–36)
MCHC RBC-ENTMCNC: 33 GM/DL — SIGNIFICANT CHANGE UP (ref 32–36)
MCHC RBC-ENTMCNC: 33 GM/DL — SIGNIFICANT CHANGE UP (ref 32–36)
MCV RBC AUTO: 89.4 FL — SIGNIFICANT CHANGE UP (ref 80–100)
MCV RBC AUTO: 89.9 FL — SIGNIFICANT CHANGE UP (ref 80–100)
MCV RBC AUTO: 90.2 FL — SIGNIFICANT CHANGE UP (ref 80–100)
MCV RBC AUTO: 90.4 FL — SIGNIFICANT CHANGE UP (ref 80–100)
MCV RBC AUTO: 92.4 FL — SIGNIFICANT CHANGE UP (ref 80–100)
MCV RBC AUTO: 93.2 FL — SIGNIFICANT CHANGE UP (ref 80–100)
OB PNL STL: POSITIVE
PHOSPHATE SERPL-MCNC: 3 MG/DL — SIGNIFICANT CHANGE UP (ref 2.5–4.5)
PLATELET # BLD AUTO: 297 K/UL — SIGNIFICANT CHANGE UP (ref 150–400)
PLATELET # BLD AUTO: 301 K/UL — SIGNIFICANT CHANGE UP (ref 150–400)
PLATELET # BLD AUTO: 317 K/UL — SIGNIFICANT CHANGE UP (ref 150–400)
PLATELET # BLD AUTO: 342 K/UL — SIGNIFICANT CHANGE UP (ref 150–400)
PLATELET # BLD AUTO: 355 K/UL — SIGNIFICANT CHANGE UP (ref 150–400)
PLATELET # BLD AUTO: 366 K/UL — SIGNIFICANT CHANGE UP (ref 150–400)
POTASSIUM SERPL-MCNC: 2.8 MMOL/L — CRITICAL LOW (ref 3.5–5.3)
POTASSIUM SERPL-MCNC: 3.4 MMOL/L — LOW (ref 3.5–5.3)
POTASSIUM SERPL-SCNC: 2.8 MMOL/L — CRITICAL LOW (ref 3.5–5.3)
POTASSIUM SERPL-SCNC: 3.4 MMOL/L — LOW (ref 3.5–5.3)
RBC # BLD: 2.23 M/UL — LOW (ref 3.8–5.2)
RBC # BLD: 2.34 M/UL — LOW (ref 3.8–5.2)
RBC # BLD: 2.92 M/UL — LOW (ref 3.8–5.2)
RBC # BLD: 2.92 M/UL — LOW (ref 3.8–5.2)
RBC # BLD: 2.98 M/UL — LOW (ref 3.8–5.2)
RBC # BLD: 3.06 M/UL — LOW (ref 3.8–5.2)
RBC # FLD: 14.2 % — SIGNIFICANT CHANGE UP (ref 10.3–14.5)
RBC # FLD: 14.6 % — HIGH (ref 10.3–14.5)
RBC # FLD: 14.6 % — HIGH (ref 10.3–14.5)
RBC # FLD: 14.7 % — HIGH (ref 10.3–14.5)
SODIUM SERPL-SCNC: 139 MMOL/L — SIGNIFICANT CHANGE UP (ref 135–145)
SODIUM SERPL-SCNC: 141 MMOL/L — SIGNIFICANT CHANGE UP (ref 135–145)
WBC # BLD: 10.66 K/UL — HIGH (ref 3.8–10.5)
WBC # BLD: 11.08 K/UL — HIGH (ref 3.8–10.5)
WBC # BLD: 12.81 K/UL — HIGH (ref 3.8–10.5)
WBC # BLD: 13.57 K/UL — HIGH (ref 3.8–10.5)
WBC # BLD: 17.23 K/UL — HIGH (ref 3.8–10.5)
WBC # BLD: 19.95 K/UL — HIGH (ref 3.8–10.5)
WBC # FLD AUTO: 10.66 K/UL — HIGH (ref 3.8–10.5)
WBC # FLD AUTO: 11.08 K/UL — HIGH (ref 3.8–10.5)
WBC # FLD AUTO: 12.81 K/UL — HIGH (ref 3.8–10.5)
WBC # FLD AUTO: 13.57 K/UL — HIGH (ref 3.8–10.5)
WBC # FLD AUTO: 17.23 K/UL — HIGH (ref 3.8–10.5)
WBC # FLD AUTO: 19.95 K/UL — HIGH (ref 3.8–10.5)

## 2022-01-27 PROCEDURE — 99291 CRITICAL CARE FIRST HOUR: CPT

## 2022-01-27 RX ORDER — PANTOPRAZOLE SODIUM 20 MG/1
8 TABLET, DELAYED RELEASE ORAL
Qty: 80 | Refills: 0 | Status: DISCONTINUED | OUTPATIENT
Start: 2022-01-27 | End: 2022-01-28

## 2022-01-27 RX ORDER — SODIUM CHLORIDE 9 MG/ML
500 INJECTION INTRAMUSCULAR; INTRAVENOUS; SUBCUTANEOUS ONCE
Refills: 0 | Status: COMPLETED | OUTPATIENT
Start: 2022-01-27 | End: 2022-01-28

## 2022-01-27 RX ORDER — PANTOPRAZOLE SODIUM 20 MG/1
80 TABLET, DELAYED RELEASE ORAL ONCE
Refills: 0 | Status: DISCONTINUED | OUTPATIENT
Start: 2022-01-27 | End: 2022-01-27

## 2022-01-27 RX ORDER — PANTOPRAZOLE SODIUM 20 MG/1
40 TABLET, DELAYED RELEASE ORAL ONCE
Refills: 0 | Status: COMPLETED | OUTPATIENT
Start: 2022-01-27 | End: 2022-01-27

## 2022-01-27 RX ORDER — SODIUM CHLORIDE 9 MG/ML
300 INJECTION INTRAMUSCULAR; INTRAVENOUS; SUBCUTANEOUS ONCE
Refills: 0 | Status: COMPLETED | OUTPATIENT
Start: 2022-01-27 | End: 2022-01-27

## 2022-01-27 RX ADMIN — PANTOPRAZOLE SODIUM 40 MILLIGRAM(S): 20 TABLET, DELAYED RELEASE ORAL at 11:09

## 2022-01-27 RX ADMIN — SODIUM CHLORIDE 600 MILLILITER(S): 9 INJECTION INTRAMUSCULAR; INTRAVENOUS; SUBCUTANEOUS at 01:18

## 2022-01-27 RX ADMIN — FOSPHENYTOIN 108 MILLIGRAM(S) PE: 50 INJECTION INTRAMUSCULAR; INTRAVENOUS at 23:11

## 2022-01-27 RX ADMIN — PANTOPRAZOLE SODIUM 40 MILLIGRAM(S): 20 TABLET, DELAYED RELEASE ORAL at 09:49

## 2022-01-27 RX ADMIN — Medication 1 DROP(S): at 09:49

## 2022-01-27 RX ADMIN — PANTOPRAZOLE SODIUM 10 MG/HR: 20 TABLET, DELAYED RELEASE ORAL at 11:09

## 2022-01-27 RX ADMIN — Medication 200 MILLIGRAM(S): at 09:49

## 2022-01-27 RX ADMIN — Medication 3 MILLIGRAM(S): at 23:59

## 2022-01-27 RX ADMIN — ATOVAQUONE 1500 MILLIGRAM(S): 750 SUSPENSION ORAL at 09:49

## 2022-01-27 RX ADMIN — CARVEDILOL PHOSPHATE 3.12 MILLIGRAM(S): 80 CAPSULE, EXTENDED RELEASE ORAL at 09:50

## 2022-01-27 RX ADMIN — LEVETIRACETAM 400 MILLIGRAM(S): 250 TABLET, FILM COATED ORAL at 23:11

## 2022-01-27 RX ADMIN — CARVEDILOL PHOSPHATE 3.12 MILLIGRAM(S): 80 CAPSULE, EXTENDED RELEASE ORAL at 23:11

## 2022-01-27 RX ADMIN — SODIUM CHLORIDE 1200 MILLILITER(S): 9 INJECTION INTRAMUSCULAR; INTRAVENOUS; SUBCUTANEOUS at 02:24

## 2022-01-27 RX ADMIN — PANTOPRAZOLE SODIUM 10 MG/HR: 20 TABLET, DELAYED RELEASE ORAL at 20:45

## 2022-01-27 NOTE — CONSULT NOTE ADULT - SUBJECTIVE AND OBJECTIVE BOX
HPI:  HPI: The patient a  42 y/o F PMx significant for MRSA bacteremia, COPD, Hypertension, ESRD on hemodialysis (MWF) (kidney bx c/w ANCA vasculitis), cocaine and heroin abuse, depression, GERD, epilepsy, Raynaud's syndrome, Rheumatoid arthritis, SLE, seizure disorder, hospitalized 12/15/21 through 12/18/21 for seizure, presented in the ER via EMS  for AMS. The patient family found her on the floor unresponsive after they lleft her alone for an hour. The patient respond only to painful stimulation. The patient was evaluated by ICU practitioner and recommended CICU admission.     PMHx: as above    PSHx: PEGT, BTL, tracheostomy, appendectomy    Family Hx: not obtainable secondary to AMS unresponsive     Social Hx.: drug abuse,              (18 Jan 2022 23:08)      PAST MEDICAL & SURGICAL HISTORY:  Rheumatoid arthritis    H/O Raynaud&#x27;s syndrome    Heroin abuse    Smoker    Sepsis    HTN (hypertension)    COPD (chronic obstructive pulmonary disease)    Depression    Epilepsy    GERD (gastroesophageal reflux disease)    Bacteremia    Systemic lupus erythematosus    Aphonia    H/O tubal ligation    H/O appendicitis    Gall bladder stones    H/O tracheostomy    S/P percutaneous endoscopic gastrostomy (PEG) tube placement        MEDICATIONS  (STANDING):  artificial  tears Solution 1 Drop(s) Both EYES two times a day  atovaquone  Suspension 1500 milliGRAM(s) Oral daily  carvedilol 3.125 milliGRAM(s) Oral every 12 hours  epoetin amee-epbx (RETACRIT) Injectable 15495 Unit(s) IV Push <User Schedule>  fosphenytoin IVPB 200 milliGRAM(s) PE IV Intermittent <User Schedule>  hydroxychloroquine 200 milliGRAM(s) Oral daily  levETIRAcetam  IVPB 500 milliGRAM(s) IV Intermittent at bedtime  pantoprazole Infusion 8 mG/Hr (10 mL/Hr) IV Continuous <Continuous>    MEDICATIONS  (PRN):  acetaminophen  Suppository .. 650 milliGRAM(s) Rectal every 6 hours PRN Temp greater or equal to 38C (100.4F)  albumin human 25% IVPB 50 milliLiter(s) IV Intermittent every 1 hour PRN SBP less than 100  ALBUTerol    90 MICROgram(s) HFA Inhaler 2 Puff(s) Inhalation every 6 hours PRN Shortness of Breath and/or Wheezing  aluminum hydroxide/magnesium hydroxide/simethicone Suspension 30 milliLiter(s) Oral every 4 hours PRN Dyspepsia  hydrALAZINE Injectable 10 milliGRAM(s) IV Push every 6 hours PRN SBP>140 or MAP>100  levETIRAcetam  IVPB 250 milliGRAM(s) IV Intermittent daily PRN After dialysis  LORazepam   Injectable 2 milliGRAM(s) IV Push every 2 hours PRN Agitation  melatonin 3 milliGRAM(s) Oral at bedtime PRN Insomnia  metoprolol tartrate Injectable 2.5 milliGRAM(s) IV Push every 6 hours PRN HR.130 sustained  ondansetron Injectable 4 milliGRAM(s) IV Push every 8 hours PRN Nausea and/or Vomiting  simethicone 80 milliGRAM(s) Chew three times a day PRN Gas      Allergies    morphine (Rash)    Intolerances        SOCIAL HISTORY:    FAMILY HISTORY:  Family history of cardiomyopathy (Mother)     Non-contributory    REVIEW OF SYSTEMS      General:	    Respiratory and Thorax:  	  Cardiovascular:	    Gastrointestinal:	    Musculoskeletal:	   Vital Signs Last 24 Hrs  T(C): 36.1 (27 Jan 2022 12:00), Max: 37.1 (27 Jan 2022 05:00)  T(F): 97 (27 Jan 2022 12:00), Max: 98.7 (27 Jan 2022 05:00)  HR: 105 (27 Jan 2022 14:00) (102 - 136)  BP: 142/94 (27 Jan 2022 14:00) (87/69 - 142/94)  BP(mean): 104 (27 Jan 2022 14:00) (63 - 109)  RR: 28 (27 Jan 2022 14:00) (8 - 40)  SpO2: 97% (27 Jan 2022 14:00) (97% - 100%)    HEENT :Pallor  Chest CTA  Abd Soft Non tender  CVS S1 s 2 Normal  No focal Deficit     LABS:                        8.7    10.66 )-----------( 317      ( 27 Jan 2022 12:41 )             26.4     01-27    141  |  104  |  52<H>  ----------------------------<  97  3.4<L>   |  26  |  3.23<H>    Ca    7.9<L>      27 Jan 2022 04:26  Phos  3.0     01-27  Mg     1.7     01-27            RADIOLOGY & ADDITIONAL STUDIES:

## 2022-01-27 NOTE — CONSULT NOTE ADULT - REASON FOR ADMISSION
AMS, sepsis, ESKD

## 2022-01-27 NOTE — CHART NOTE - NSCHARTNOTEFT_GEN_A_CORE
42 y/o F PMx significant for MRSA bacteremia, COPD, Hypertension, ESRD on hemodialysis (MWF) (kidney bx c/w ANCA vasculitis), cocaine and heroin abuse, depression, GERD, epilepsy, Raynaud's syndrome, Rheumatoid arthritis, SLE, seizure disorder, Patient currently on precautions for ESBL.  C/O mild dizziness , seeing floaters in front of her eyes and feeling warm .  No chills ,cough , chest pain or shortness of breath. Had dialysis earlier today.  Vital Signs Last 24 Hrs  T(C): 36.4 (27 Jan 2022 00:21), Max: 37.2 (26 Jan 2022 10:55)  T(F): 97.5 (27 Jan 2022 00:21), Max: 99 (26 Jan 2022 10:55)  HR: 107 (27 Jan 2022 00:00) (102 - 136)  BP: 104/72 (27 Jan 2022 00:00) (104/72 - 164/96)  BP(mean): 78 (27 Jan 2022 00:00) (78 - 112)  RR: 11 (27 Jan 2022 00:00) (8 - 38)  SpO2: 100% (27 Jan 2022 00:00) (97% - 100%)    alert ,oriented , not in acute distress.   sinus rhythm  RR 18 no labor  /60   ( received Coreg and Hydralazine earlier )    Lungs clear   Heart  tachy 120  no murmur or gallop  abdomen  soft non tender    had large liquid stool earlier                         9.6    10.64 )-----------( 375      ( 26 Jan 2022 06:21 )             30.3     01-26    134<L>  |  101  |  66<H>  ----------------------------<  109<H>  3.2<L>   |  17<L>  |  5.88<H>    Ca    8.3<L>      26 Jan 2022 06:21  Phos  4.9     01-26  Mg     2.0     01-26        Imp  probable volume depletion             Plan bolus  300 cc 0.9% Na Cl          CBC , BMP , Lactate stat 40 y/o F PMx significant for MRSA bacteremia, COPD, Hypertension, ESRD on hemodialysis (MWF) (kidney bx c/w ANCA vasculitis), cocaine and heroin abuse, depression, GERD, epilepsy, Raynaud's syndrome, Rheumatoid arthritis, SLE, seizure disorder, Patient currently on precautions for ESBL.  C/O mild dizziness , seeing floaters in front of her eyes and feeling warm .  No chills ,cough , chest pain or shortness of breath. Had dialysis earlier today.  Vital Signs Last 24 Hrs  T(C): 36.4 (27 Jan 2022 00:21), Max: 37.2 (26 Jan 2022 10:55)  T(F): 97.5 (27 Jan 2022 00:21), Max: 99 (26 Jan 2022 10:55)  HR: 107 (27 Jan 2022 00:00) (102 - 136)  BP: 104/72 (27 Jan 2022 00:00) (104/72 - 164/96)  BP(mean): 78 (27 Jan 2022 00:00) (78 - 112)  RR: 11 (27 Jan 2022 00:00) (8 - 38)  SpO2: 100% (27 Jan 2022 00:00) (97% - 100%)    alert ,oriented , not in acute distress.   sinus rhythm  RR 18 no labor  /60   ( received Coreg and Hydralazine earlier )    Lungs clear   Heart  tachy 120  no murmur or gallop  abdomen  soft non tender    had large liquid stool earlier                         9.6    10.64 )-----------( 375      ( 26 Jan 2022 06:21 )             30.3     01-26    134<L>  |  101  |  66<H>  ----------------------------<  109<H>  3.2<L>   |  17<L>  |  5.88<H>    Ca    8.3<L>      26 Jan 2022 06:21  Phos  4.9     01-26  Mg     2.0     01-26    Imp  probable volume depletion             Plan bolus  300 cc 0.9% Na Cl          CBC , BMP , Lactate stat, rectal temp q4h x 3          recheck  in a couple of hours and review stat labs 42 y/o F PMx significant for MRSA bacteremia, COPD, Hypertension, ESRD on hemodialysis (MWF) (kidney bx c/w ANCA vasculitis), cocaine and heroin abuse, depression, GERD, epilepsy, Raynaud's syndrome, Rheumatoid arthritis, SLE, seizure disorder, Patient currently on precautions for ESBL.  C/O mild dizziness , seeing floaters in front of her eyes and feeling warm .  No chills ,cough , chest pain or shortness of breath. Had dialysis earlier today.  Vital Signs Last 24 Hrs  T(C): 36.4 (27 Jan 2022 00:21), Max: 37.2 (26 Jan 2022 10:55)  T(F): 97.5 (27 Jan 2022 00:21), Max: 99 (26 Jan 2022 10:55)  HR: 107 (27 Jan 2022 00:00) (102 - 136)  BP: 104/72 (27 Jan 2022 00:00) (104/72 - 164/96)  BP(mean): 78 (27 Jan 2022 00:00) (78 - 112)  RR: 11 (27 Jan 2022 00:00) (8 - 38)  SpO2: 100% (27 Jan 2022 00:00) (97% - 100%)    alert ,oriented , not in acute distress.   sinus rhythm  RR 18 no labor  /60   ( received Coreg and Hydralazine earlier )    Lungs clear   Heart  tachy 120  no murmur or gallop  abdomen  soft non tender    had large liquid stool earlier                         9.6    10.64 )-----------( 375      ( 26 Jan 2022 06:21 )             30.3     01-26    134<L>  |  101  |  66<H>  ----------------------------<  109<H>  3.2<L>   |  17<L>  |  5.88<H>    Ca    8.3<L>      26 Jan 2022 06:21  Phos  4.9     01-26  Mg     2.0     01-26    Imp  probable volume depletion             Plan bolus  300 cc 0.9% Na Cl          CBC , BMP , Lactate stat, rectal temp q4h x 3          recheck  in a couple of hours and review stat labs    ADDENDUM   0200  1/27/2021                        6.9    17.23 )-----------( 366      ( 27 Jan 2022 00:55 )             21.8 // 01-27    139  |  105  |  49<H>  ----------------------------<  170<H>  2.8<LL>   |  23  |  3.18<H>    Ca    7.8<L>      27 Jan 2022 00:55  Phos  4.9     01-26  Mg     2.0     01-26    bP96/60     NO 87/69     PLAN bolus another 300 cc NACL 0.95%           repeat H/H/ stat   Type and X-match            Critical Care Consult  spoke to CRITICAL CARE PA            he will see patient ASAP .

## 2022-01-27 NOTE — PROGRESS NOTE ADULT - SUBJECTIVE AND OBJECTIVE BOX
HPI: The patient a  42 y/o F PMx significant for MRSA bacteremia, COPD, Hypertension, ESRD on hemodialysis (MWF) (kidney bx c/w ANCA vasculitis), cocaine and heroin abuse, depression, GERD, epilepsy, Raynaud's syndrome, Rheumatoid arthritis, SLE, seizure disorder, hospitalized 12/15/21 through 12/18/21 for seizure, presented in the ER via EMS  for AMS. The patient family found her on the floor unresponsive after they lleft her alone for an hour. The patient respond only to painful stimulation. Patient was intubated for a Likely aspiration PNA.      1/24: Patient seen in bed intubated.  She is weaning well.  S/P HD today  1/25: Patient doing well extubated.  No HD today  1/27: Patient is now having Melena and dropped her H & H and had 1 U PRBC        PAST MEDICAL & SURGICAL HISTORY:  Rheumatoid arthritis    H/O Raynaud&#x27;s syndrome    Heroin abuse    Smoker    Sepsis    HTN (hypertension)    COPD (chronic obstructive pulmonary disease)    Depression    Epilepsy    GERD (gastroesophageal reflux disease)    Bacteremia    Systemic lupus erythematosus    Aphonia    H/O tubal ligation    H/O appendicitis    Gall bladder stones    H/O tracheostomy    S/P percutaneous endoscopic gastrostomy (PEG) tube placement        FAMILY HISTORY:  Family history of cardiomyopathy (Mother)        Social Hx:    Allergies    morphine (Rash)    Intolerances            ICU Vital Signs Last 24 Hrs  T(C): 36.6 (27 Jan 2022 08:00), Max: 37.2 (26 Jan 2022 10:55)  T(F): 97.8 (27 Jan 2022 08:00), Max: 99 (26 Jan 2022 10:55)  HR: 111 (27 Jan 2022 09:00) (107 - 136)  BP: 123/85 (27 Jan 2022 09:00) (87/69 - 157/98)  BP(mean): 94 (27 Jan 2022 09:00) (63 - 112)  ABP: --  ABP(mean): --  RR: 29 (27 Jan 2022 09:00) (8 - 40)  SpO2: 100% (27 Jan 2022 09:00) (97% - 100%)          I&O's Summary    26 Jan 2022 07:01  -  27 Jan 2022 07:00  --------------------------------------------------------  IN: 600 mL / OUT: 0 mL / NET: 600 mL    27 Jan 2022 07:01  -  27 Jan 2022 09:57  --------------------------------------------------------  IN: 339 mL / OUT: 0 mL / NET: 339 mL                              8.5    11.08 )-----------( 297      ( 27 Jan 2022 09:33 )             26.1       01-27    141  |  104  |  52<H>  ----------------------------<  97  3.4<L>   |  26  |  3.23<H>    Ca    7.9<L>      27 Jan 2022 04:26  Phos  3.0     01-27  Mg     1.7     01-27                      MEDICATIONS  (STANDING):  artificial  tears Solution 1 Drop(s) Both EYES two times a day  atovaquone  Suspension 1500 milliGRAM(s) Oral daily  carvedilol 3.125 milliGRAM(s) Oral every 12 hours  epoetin amee-epbx (RETACRIT) Injectable 79631 Unit(s) IV Push <User Schedule>  fosphenytoin IVPB 200 milliGRAM(s) PE IV Intermittent <User Schedule>  hydroxychloroquine 200 milliGRAM(s) Oral daily  levETIRAcetam  IVPB 500 milliGRAM(s) IV Intermittent at bedtime  pantoprazole  Injectable 40 milliGRAM(s) IV Push two times a day  pantoprazole  Injectable 80 milliGRAM(s) IV Push once  pantoprazole Infusion 8 mG/Hr (10 mL/Hr) IV Continuous <Continuous>    MEDICATIONS  (PRN):  acetaminophen  Suppository .. 650 milliGRAM(s) Rectal every 6 hours PRN Temp greater or equal to 38C (100.4F)  albumin human 25% IVPB 50 milliLiter(s) IV Intermittent every 1 hour PRN SBP less than 100  ALBUTerol    90 MICROgram(s) HFA Inhaler 2 Puff(s) Inhalation every 6 hours PRN Shortness of Breath and/or Wheezing  aluminum hydroxide/magnesium hydroxide/simethicone Suspension 30 milliLiter(s) Oral every 4 hours PRN Dyspepsia  hydrALAZINE Injectable 10 milliGRAM(s) IV Push every 6 hours PRN SBP>140 or MAP>100  levETIRAcetam  IVPB 250 milliGRAM(s) IV Intermittent daily PRN After dialysis  LORazepam   Injectable 2 milliGRAM(s) IV Push every 2 hours PRN Agitation  melatonin 3 milliGRAM(s) Oral at bedtime PRN Insomnia  metoprolol tartrate Injectable 2.5 milliGRAM(s) IV Push every 6 hours PRN HR.130 sustained  ondansetron Injectable 4 milliGRAM(s) IV Push every 8 hours PRN Nausea and/or Vomiting  simethicone 80 milliGRAM(s) Chew three times a day PRN Gas      DVT Prophylaxis:    Advanced Directives:  Discussed with:    Visit Information:    ** Time is exclusive of billed procedures and/or teaching and/or routine family updates.

## 2022-01-27 NOTE — PROGRESS NOTE ADULT - SUBJECTIVE AND OBJECTIVE BOX
Patient is a 41y Female who habing melena overnight    required PRBC     for EGD today         MEDICATIONS  (STANDING):  artificial  tears Solution 1 Drop(s) Both EYES two times a day  atovaquone  Suspension 1500 milliGRAM(s) Oral daily  carvedilol 3.125 milliGRAM(s) Oral every 12 hours  epoetin amee-epbx (RETACRIT) Injectable 95088 Unit(s) IV Push <User Schedule>  fosphenytoin IVPB 200 milliGRAM(s) PE IV Intermittent <User Schedule>  hydroxychloroquine 200 milliGRAM(s) Oral daily  levETIRAcetam  IVPB 500 milliGRAM(s) IV Intermittent at bedtime  pantoprazole Infusion 8 mG/Hr (10 mL/Hr) IV Continuous <Continuous>    I&O's Detail    26 Jan 2022 07:01  -  27 Jan 2022 07:00  --------------------------------------------------------  IN:    Sodium Chloride 0.9% Bolus: 600 mL  Total IN: 600 mL    OUT:    Other (mL): 0 mL  Total OUT: 0 mL    Total NET: 600 mL      27 Jan 2022 07:01  -  27 Jan 2022 11:29  --------------------------------------------------------  IN:    PRBCs (Packed Red Blood Cells): 339 mL  Total IN: 339 mL    OUT:  Total OUT: 0 mL    Total NET: 339 mL        PHYSICAL EXAM:    Constitutional: frail, >>stated age  HEENT:dry, pale MM   Neck: No LAD, No JVD  Respiratory: altagraciat olga  Cardiovascular: S1 and S2   Extremities: chronic change/peripheral edema  Neurological: Arousable    hd cath        LABS                              8.5    11.08 )-----------( 297      ( 27 Jan 2022 09:33 )             26.1                         6.5    12.81 )-----------( 301      ( 27 Jan 2022 04:26 )             20.6           141    |  104    |  52     ----------------------------<  97        27 Jan 2022 04:26  3.4     |  26     |  3.23     139    |  105    |  49     ----------------------------<  170       27 Jan 2022 00:55  2.8     |  23     |  3.18     134    |  101    |  66     ----------------------------<  109       26 Jan 2022 06:21  3.2     |  17     |  5.88     Ca    7.9        27 Jan 2022 04:26  Ca    7.8        27 Jan 2022 00:55    Phos  3.0       27 Jan 2022 04:26  Phos  4.9       26 Jan 2022 06:21    Mg     1.7       27 Jan 2022 04:26  Mg     2.0       26 Jan 2022 06:21    TPro  x      /  Alb  2.3    /  TBili  x      /        24 Jan 2022 07:41  DBili  x      /  AST  x      /  ALT  x      /  AlkPhos  x              Urine Studies:          RADIOLOGY & ADDITIONAL STUDIES:

## 2022-01-27 NOTE — PROGRESS NOTE ADULT - SUBJECTIVE AND OBJECTIVE BOX
Patient is a 41y old  Female who presents with a chief complaint of AMS, sepsis, ESKD (26 Jan 2022 17:43)    HPI:  42 y/o F PMx significant for MRSA bacteremia, COPD, Hypertension, ESRD on hemodialysis (MWF) (kidney bx c/w ANCA vasculitis), cocaine and heroin abuse, depression, GERD, epilepsy, Raynaud's syndrome, Rheumatoid arthritis, SLE, seizure disorder, hospitalized 12/15/21 through 12/18/21 for seizure, presented in the ER via EMS  for AMS. The patient family found her on the floor unresponsive after they lleft her alone for an hour.    Tonight patient noted to be diaphoretic and tachycardic. Patient admitted to lightheadedness. BP noted to be in 80's systolic at the time. Recieved NS bolus w/ improvement in symptoms and vitals. At bedside patient admits to improvement in symptoms. Hgb noted 6.9. Black stool noted by RN. Patient denies any abdominal pain, dizziness, lightheadedness.    PMHx: as above    PSHx: PEGT, BTL, tracheostomy, appendectomy    Family Hx: not obtainable secondary to AMS unresponsive     Social Hx.: drug abuse,              (18 Jan 2022 23:08)    Allergies: morphine    PAST MEDICAL & SURGICAL HISTORY:  Rheumatoid arthritis    H/O Raynaud&#x27;s syndrome    Heroin abuse    Smoker    Sepsis    HTN (hypertension)    COPD (chronic obstructive pulmonary disease)    Depression    Epilepsy    GERD (gastroesophageal reflux disease)    Bacteremia    Systemic lupus erythematosus    Aphonia    H/O tubal ligation    H/O appendicitis    Gall bladder stones    H/O tracheostomy    S/P percutaneous endoscopic gastrostomy (PEG) tube placement      FAMILY HISTORY:  Family history of cardiomyopathy (Mother)      SOCIAL HISTORY:    Home Medications:    Review of Systems:  Pertinent as noted above    T(F): 97.5 (01-27-22 @ 00:21), Max: 99 (01-26-22 @ 10:55)  HR: 119 (01-27-22 @ 02:30) (103 - 136)  BP: 106/64 (01-27-22 @ 02:30) (87/69 - 164/96)  RR: 17 (01-27-22 @ 02:30) (8 - 38)  SpO2: 98% (01-27-22 @ 02:30)  Wt(kg): --    CAPILLARY BLOOD GLUCOSE      POCT Blood Glucose.: 146 mg/dL (26 Jan 2022 23:48)    I&O's Summary    25 Jan 2022 07:01  -  26 Jan 2022 07:00  --------------------------------------------------------  IN: 1240 mL / OUT: 0 mL / NET: 1240 mL    26 Jan 2022 07:01  -  27 Jan 2022 02:57  --------------------------------------------------------  IN: 300 mL / OUT: 0 mL / NET: 300 mL        Physical Exam:     Gen: chronically ill appearing, pale  Neuro: awake/alert  CVS: Sinus tachycarida  Abd: Soft NT ND  Ext: warm, dr davila, no edmea  Skin: well perfused    Meds:  atovaquone  Suspension 1500 milliGRAM(s) Oral daily  hydroxychloroquine 200 milliGRAM(s) Oral daily    carvedilol 3.125 milliGRAM(s) Oral every 12 hours  hydrALAZINE Injectable 10 milliGRAM(s) IV Push every 6 hours PRN  metoprolol tartrate Injectable 2.5 milliGRAM(s) IV Push every 6 hours PRN        ALBUTerol    90 MICROgram(s) HFA Inhaler 2 Puff(s) Inhalation every 6 hours PRN     acetaminophen  Suppository .. 650 milliGRAM(s) Rectal every 6 hours PRN  fosphenytoin IVPB 200 milliGRAM(s) PE IV Intermittent <User Schedule>  levETIRAcetam  IVPB 500 milliGRAM(s) IV Intermittent at bedtime  levETIRAcetam  IVPB 250 milliGRAM(s) IV Intermittent daily PRN  LORazepam   Injectable 2 milliGRAM(s) IV Push every 2 hours PRN  melatonin 3 milliGRAM(s) Oral at bedtime PRN  ondansetron Injectable 4 milliGRAM(s) IV Push every 8 hours PRN        heparin   Injectable 5000 Unit(s) SubCutaneous every 8 hours     aluminum hydroxide/magnesium hydroxide/simethicone Suspension 30 milliLiter(s) Oral every 4 hours PRN  pantoprazole  Injectable 40 milliGRAM(s) IV Push two times a day  simethicone 80 milliGRAM(s) Chew three times a day PRN        albumin human 25% IVPB 50 milliLiter(s) IV Intermittent every 1 hour PRN     epoetin amee-epbx (RETACRIT) Injectable 52230 Unit(s) IV Push <User Schedule>     artificial  tears Solution 1 Drop(s) Both EYES two times a day                              6.9    17.23 )-----------( 366      ( 27 Jan 2022 00:55 )             21.8       01-27    139  |  105  |  49<H>  ----------------------------<  170<H>  2.8<LL>   |  23  |  3.18<H>    Ca    7.8<L>      27 Jan 2022 00:55  Phos  4.9     01-26  Mg     2.0     01-26      Lactate 2.3           01-27 @ 00:55                      Radiology:     ACC: [id number] EXAM:  XR CHEST PORTABLE URGENT 1V                          PROCEDURE DATE:  01/24/2022          INTERPRETATION:  INDICATION: Pneumonia    PRIORS: 1/20/2022.    VIEWS: Portable AP radiography of the chest performed.    FINDINGS: Sinceprior evaluation there is no significant interval change   in position of the indwelling ETT, right-sided central venous catheter or   NGT. Heart size appears within normal limits. No superior mediastinal   widening. No evidence for focal infiltrate,lobar consolidation or   pulmonary edema. Previously noted left lower lobe retrocardiac opacity   with obscuration of left hemidiaphragm has resolved. No pleural effusion.   No pneumothorax. No mediastinal shift.    IMPRESSION: No evidence for focal infiltrate or lobar consolidation.    --- End of Report ---            MARCELLA HAYWARD MD; Attending Radiologist  This document has been electronically signed. Jan 24 2022 12:35PM    -GI Bleed  -Acute blood loss anemia  -Hypotension  -Hypokalemia    Assessment/Plan:    -Repeat STAT CBC; transfuse to keep Hgb >7  -Dark stools noted; Protonix BID for upper GI bleed  -BP responding to volume resuscitation will receive in the form of PRBC  -Hold Anti-hypertensives  -Potassium supplemented  -D/C Heparin SC and start SCD for DVT ppx

## 2022-01-27 NOTE — CONSULT NOTE ADULT - CONSULT REASON
Hypoxic Respiratory Failure/Respiratory Distress
ESRD
Elevated trops
ams  sepsis  esrd
Suzanne
AMS, seizure disorder

## 2022-01-27 NOTE — CONSULT NOTE ADULT - CONSULT REQUESTED DATE/TIME
20-Jan-2022 05:30
19-Jan-2022 10:58
19-Jan-2022 11:51
19-Jan-2022 12:35
19-Jan-2022 10:10
27-Jan-2022 14:08

## 2022-01-27 NOTE — PROGRESS NOTE ADULT - ASSESSMENT
A/P: 41 year old female with Acute Hypoxic Respiratory Failure from a LLL Pneumonia  ESRD on HD  RA  Seizure disorder  IVDA    Plan:  CCU    PULM: Continue NC O2    Cardio: Tachy from bleeding    Renal: HD as per renal    GI: NPO, PPI Drip, H H Q6h, Dr. Aceves called and will do an EGD today    ID:  Mepron for PCP prophylaxis     Neuro: Negative LP, continue Keppra for seizure disorder    Hold SQH DVT Prophylaxis because of GIB

## 2022-01-27 NOTE — PROGRESS NOTE ADULT - ASSESSMENT
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  on HD MWF. Recent admission for MRSA bacteremia - permcath was replaced . 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

## 2022-01-27 NOTE — CONSULT NOTE ADULT - ASSESSMENT
A/P: 42 y/o F with PMx significant for MRSA bacteremia, COPD, Hypertension, ESRD on hemodialysis (MWF) (kidney bx c/w ANCA vasculitis), cocaine and heroin abuse, depression, GERD, epilepsy, Raynaud's syndrome, Rheumatoid arthritis, SLE, seizure disorder (hospitalized 12/15/21 through 12/18/21 for seizure, was discharged on Dilantin), presented to ED via EMS for AMS on 1/18/21, patient's brother found her laying on the floor and was unresponsive, foaming from the mouth; when EMS picked her up yesterday there were reportedly needles underneath her. Patient's brother feels that her seizures started 8 years ago from drug use, but has not seen her take drugs and has never witnessed one of her seizures firsthand. Today, patient appears drowsy and will not communicate or follow most commands, full neuro exam was therefore difficult to obtain.     #?Seizure disorder-overall it is unclear whether patient has a real seizure disorder, or whether she has been experiencing seizures secondary to drug use. It is also unclear whether patient had a real seizure on 1/18, or if she was unresponsive d/t drug overdose.     #AMS secondary to ?toxic encephalopathy from drug use- patient with +cocaine and thc levels, last seen by Neuro on 12/17 and patient seemed to be alert and oriented at that time.    -Continue dilantin 300mg qhs  -24 hr EEG ordered to assess for seizure activity  -DVT prophylaxis  -Recommend psych eval when patient is more alert d/t depression and suicidal ideations  -Correct all underlying metabolic abnormalities  -Further recs as per medicine    
   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  on HD MWF. Recent admission for MRSA bacteremia - permcath was replaced . 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
42 y/o F PMx significant for MRSA bacteremia, COPD, Hypertension, ESRD on hemodialysis (MWF) (kidney bx c/w ANCA vasculitis), cocaine and heroin abuse, depression, GERD, epilepsy, Raynaud's syndrome, Rheumatoid arthritis, SLE, seizure disorder, hospitalized 12/15/21 through 12/18/21 for seizure, presented in the ER on 01/18 via EMS  for AMS.     -Pt w leukocytosis, sinus tachycardia, high sensitivity trop of 286, elevated BNP, + cocaine and THC on Utox. Cardiology consulted for further evaluation of elevated trop/BNP.     #Elevated troponin  -High sensitivity trop 286  -Negligible. Most likely 2/2 Type II MI, demand injury due to cocaine abuse. EKG notable for sinus tachy, no ST segment changes appreciated    #Elevated BNP  -Unclear clinical significance given ESRD on HD  -Not clinically fluid overloaded on physical exam    #Sinus Tachycardia  -Pt on cardiac monitor, pulse in the 120's  -EKG notable for sinus tachy, no ST segment changes or heart blocks appreciated  -Most likely 2/2 sepsis/cocaine abuse    *Above discussed w Dr. Allison    
42 y/o F PMx significant for MRSA bacteremia, COPD, Hypertension, ESRD on hemodialysis (MWF) (kidney bx c/w ANCA vasculitis), cocaine and heroin abuse, depression, GERD, epilepsy, Raynaud's syndrome, Rheumatoid arthritis, SLE, seizure disorder, hospitalized 12/15/21 through 12/18/21 for seizure, presented in the ER via EMS  for AMS. The patient family found her on the floor unresponsive after they left her alone for an hour. The patient respond only to painful stimulation. The patient was evaluated by ICU practitioner and recommended CICU admission. Here noted with elevated wbc ct 29, LA 5.7, imaging remarkable for new subtle compression deformities of superior t5/t6 endplates, RUL bronchiolitis, LLL atelectasis, UA with pyuria, b/l perinephric stranding noted on CT, was given IV vanco/zosyn, is on steroids, on mepron for pcp prophlyaxis.     1. sepsis. pyuria. ? uti. pyelonephritis. ESRD. ANCA vasculitis. RUL bronchiolitis. COPD. substance abuse  - imaging reviewed  - on mepron for pcp prophylaxis  - on steroids  - agree with IV zosyn [id number]  - on vancomycin post HD  - f/u blood cx, urine cx  - monitor temps  - tolerating abx well so far; no side effects noted  - reason for abx use and side effects reviewed with patient  - supportive care  - fu cbc    2. other issues - care per medicine 
42 yo f pmhx MRSA bacteremia, copd, htn, esrd on hd (MWF), kidney bx c/w ANCA vasculitis, cocaine and heroin abuse, depression, gerd, raynauds, ra, sle, seizure disorder admitted with     1. Hypoxic respiratory failure  2. ?aspiration      NEURO: seizure disorder on cerebyx, sedated on propofol.    CV: hypotensive ivory intubation likely 2/2 sedation, now improved. holding off on vasopressor therapy at this time  RESP: hypoxic respiratory failure ?aspiration requiring intubation, ac/vc 6cc/kg tv lung protecitive strategies, actively titrating fio2 and peep for spo2 >92%. post intubation abg pending  RENAL: NATALIA with ma, on bicarb gtt, possible hd today.   GI: npo, ogt in place  ENDO: poct 6hr while npo  ID: vanco/zosyn as coverage.    HEME:   DISPO: Full code.  Called and updated patient's brother Blake Moses at [phone]    Critical Care time: 60 mins assessing presenting problems of acute illness that poses high probability of life threatening deterioration or end organ damage/dysfunction.  Medical decision making including Initiating plan of care, reviewing data, reviewing radiology, discussing with multidisciplinary team, non inclusive of procedures, discussing goals of care with patient/family 
Melena  Fall in H/H  H/O CKD ? Small bowel AVMs  Plan  EGD

## 2022-01-28 LAB
ANION GAP SERPL CALC-SCNC: 11 MMOL/L — SIGNIFICANT CHANGE UP (ref 5–17)
BUN SERPL-MCNC: 54 MG/DL — HIGH (ref 7–23)
CALCIUM SERPL-MCNC: 8.2 MG/DL — LOW (ref 8.5–10.1)
CHLORIDE SERPL-SCNC: 102 MMOL/L — SIGNIFICANT CHANGE UP (ref 96–108)
CO2 SERPL-SCNC: 21 MMOL/L — LOW (ref 22–31)
CREAT SERPL-MCNC: 4.97 MG/DL — HIGH (ref 0.5–1.3)
GLUCOSE SERPL-MCNC: 81 MG/DL — SIGNIFICANT CHANGE UP (ref 70–99)
HCT VFR BLD CALC: 26.4 % — LOW (ref 34.5–45)
HGB BLD-MCNC: 8.7 G/DL — LOW (ref 11.5–15.5)
MCHC RBC-ENTMCNC: 29.9 PG — SIGNIFICANT CHANGE UP (ref 27–34)
MCHC RBC-ENTMCNC: 33 GM/DL — SIGNIFICANT CHANGE UP (ref 32–36)
MCV RBC AUTO: 90.7 FL — SIGNIFICANT CHANGE UP (ref 80–100)
PLATELET # BLD AUTO: 309 K/UL — SIGNIFICANT CHANGE UP (ref 150–400)
POTASSIUM SERPL-MCNC: 3.9 MMOL/L — SIGNIFICANT CHANGE UP (ref 3.5–5.3)
POTASSIUM SERPL-SCNC: 3.9 MMOL/L — SIGNIFICANT CHANGE UP (ref 3.5–5.3)
RBC # BLD: 2.91 M/UL — LOW (ref 3.8–5.2)
RBC # FLD: 14.6 % — HIGH (ref 10.3–14.5)
SODIUM SERPL-SCNC: 134 MMOL/L — LOW (ref 135–145)
WBC # BLD: 16.09 K/UL — HIGH (ref 3.8–10.5)
WBC # FLD AUTO: 16.09 K/UL — HIGH (ref 3.8–10.5)

## 2022-01-28 PROCEDURE — 99233 SBSQ HOSP IP/OBS HIGH 50: CPT

## 2022-01-28 PROCEDURE — 93308 TTE F-UP OR LMTD: CPT | Mod: 26

## 2022-01-28 RX ORDER — PANTOPRAZOLE SODIUM 20 MG/1
40 TABLET, DELAYED RELEASE ORAL EVERY 12 HOURS
Refills: 0 | Status: DISCONTINUED | OUTPATIENT
Start: 2022-01-28 | End: 2022-02-02

## 2022-01-28 RX ADMIN — SODIUM CHLORIDE 500 MILLILITER(S): 9 INJECTION INTRAMUSCULAR; INTRAVENOUS; SUBCUTANEOUS at 00:00

## 2022-01-28 RX ADMIN — Medication 3 MILLIGRAM(S): at 20:06

## 2022-01-28 RX ADMIN — Medication 650 MILLIGRAM(S): at 20:55

## 2022-01-28 RX ADMIN — PANTOPRAZOLE SODIUM 40 MILLIGRAM(S): 20 TABLET, DELAYED RELEASE ORAL at 09:35

## 2022-01-28 RX ADMIN — Medication 1 DROP(S): at 09:36

## 2022-01-28 RX ADMIN — Medication 650 MILLIGRAM(S): at 20:25

## 2022-01-28 RX ADMIN — CARVEDILOL PHOSPHATE 3.12 MILLIGRAM(S): 80 CAPSULE, EXTENDED RELEASE ORAL at 09:35

## 2022-01-28 RX ADMIN — ERYTHROPOIETIN 10000 UNIT(S): 10000 INJECTION, SOLUTION INTRAVENOUS; SUBCUTANEOUS at 18:09

## 2022-01-28 RX ADMIN — PANTOPRAZOLE SODIUM 40 MILLIGRAM(S): 20 TABLET, DELAYED RELEASE ORAL at 21:49

## 2022-01-28 RX ADMIN — CARVEDILOL PHOSPHATE 3.12 MILLIGRAM(S): 80 CAPSULE, EXTENDED RELEASE ORAL at 21:49

## 2022-01-28 RX ADMIN — ATOVAQUONE 1500 MILLIGRAM(S): 750 SUSPENSION ORAL at 09:35

## 2022-01-28 RX ADMIN — FOSPHENYTOIN 108 MILLIGRAM(S) PE: 50 INJECTION INTRAMUSCULAR; INTRAVENOUS at 21:49

## 2022-01-28 RX ADMIN — Medication 200 MILLIGRAM(S): at 09:35

## 2022-01-28 RX ADMIN — LEVETIRACETAM 400 MILLIGRAM(S): 250 TABLET, FILM COATED ORAL at 21:49

## 2022-01-28 RX ADMIN — Medication 650 MILLIGRAM(S): at 00:00

## 2022-01-28 NOTE — PROGRESS NOTE ADULT - SUBJECTIVE AND OBJECTIVE BOX
Interval History:    MEDICATIONS  (STANDING):  artificial  tears Solution 1 Drop(s) Both EYES two times a day  atovaquone  Suspension 1500 milliGRAM(s) Oral daily  carvedilol 3.125 milliGRAM(s) Oral every 12 hours  epoetin amee-epbx (RETACRIT) Injectable 86295 Unit(s) IV Push <User Schedule>  fosphenytoin IVPB 200 milliGRAM(s) PE IV Intermittent <User Schedule>  hydroxychloroquine 200 milliGRAM(s) Oral daily  levETIRAcetam  IVPB 500 milliGRAM(s) IV Intermittent at bedtime  pantoprazole    Tablet 40 milliGRAM(s) Oral every 12 hours    MEDICATIONS  (PRN):  acetaminophen  Suppository .. 650 milliGRAM(s) Rectal every 6 hours PRN Temp greater or equal to 38C (100.4F)  albumin human 25% IVPB 50 milliLiter(s) IV Intermittent every 1 hour PRN SBP less than 100  ALBUTerol    90 MICROgram(s) HFA Inhaler 2 Puff(s) Inhalation every 6 hours PRN Shortness of Breath and/or Wheezing  aluminum hydroxide/magnesium hydroxide/simethicone Suspension 30 milliLiter(s) Oral every 4 hours PRN Dyspepsia  hydrALAZINE Injectable 10 milliGRAM(s) IV Push every 6 hours PRN SBP>140 or MAP>100  levETIRAcetam  IVPB 250 milliGRAM(s) IV Intermittent daily PRN After dialysis  melatonin 3 milliGRAM(s) Oral at bedtime PRN Insomnia  metoprolol tartrate Injectable 2.5 milliGRAM(s) IV Push every 6 hours PRN HR.130 sustained  ondansetron Injectable 4 milliGRAM(s) IV Push every 8 hours PRN Nausea and/or Vomiting  simethicone 80 milliGRAM(s) Chew three times a day PRN Gas      Daily     Daily Weight in k.7 (2022 04:33)  BMI: 17.5 ( @ 18:39)  Change in Weight:  Vital Signs Last 24 Hrs  T(C): 36.6 (2022 12:00), Max: 37.3 (2022 16:00)  T(F): 97.8 (2022 12:00), Max: 99.1 (2022 16:00)  HR: 102 (2022 14:00) (86 - 130)  BP: 139/93 (2022 14:00) (111/78 - 161/85)  BP(mean): 105 (2022 14:00) (64 - 109)  RR: 32 (2022 14:00) (10 - 32)  SpO2: 100% (2022 14:00) (72% - 100%)  I&O's Detail    2022 07:01  -  2022 07:00  --------------------------------------------------------  IN:    IV PiggyBack: 200 mL    Pantoprazole: 210 mL    PRBCs (Packed Red Blood Cells): 339 mL    Sodium Chloride 0.9% Bolus: 500 mL  Total IN: 1249 mL    OUT:    Stool (mL): 500 mL    Voided (mL): 250 mL  Total OUT: 750 mL    Total NET: 499 mL      2022 07:01  -  2022 14:39  --------------------------------------------------------  IN:    Oral Fluid: 840 mL    Pantoprazole: 20 mL  Total IN: 860 mL    OUT:    Stool (mL): 200 mL  Total OUT: 200 mL    Total NET: 660 mL          PHYSICAL EXAM  Abd Soft Nontender  Lab Results:                        8.7    16.09 )-----------( 309      ( 2022 06:50 )             26.4     01-    141  |  104  |  52<H>  ----------------------------<  97  3.4<L>   |  26  |  3.23<H>    Ca    7.9<L>      2022 04:26  Phos  3.0     -  Mg     1.7                   Stool Results:          RADIOLOGY RESULTS:    SURGICAL PATHOLOGY:

## 2022-01-28 NOTE — PROGRESS NOTE ADULT - SUBJECTIVE AND OBJECTIVE BOX
Patient is a 41y Female who  tolerating HD    duodenitis + on EGD   uf 1.5 L       MEDICATIONS  (STANDING):  artificial  tears Solution 1 Drop(s) Both EYES two times a day  atovaquone  Suspension 1500 milliGRAM(s) Oral daily  carvedilol 3.125 milliGRAM(s) Oral every 12 hours  epoetin amee-epbx (RETACRIT) Injectable 21500 Unit(s) IV Push <User Schedule>  fosphenytoin IVPB 200 milliGRAM(s) PE IV Intermittent <User Schedule>  hydroxychloroquine 200 milliGRAM(s) Oral daily  levETIRAcetam  IVPB 500 milliGRAM(s) IV Intermittent at bedtime  pantoprazole    Tablet 40 milliGRAM(s) Oral every 12 hours    Vital Signs Last 24 Hrs  T(C): 36.6 (28 Jan 2022 19:30), Max: 37.2 (28 Jan 2022 15:55)  T(F): 97.8 (28 Jan 2022 19:30), Max: 98.9 (28 Jan 2022 15:55)  HR: 115 (28 Jan 2022 20:00) (86 - 130)  BP: 128/84 (28 Jan 2022 20:00) (111/62 - 161/85)  BP(mean): 95 (28 Jan 2022 20:00) (74 - 109)  RR: 21 (28 Jan 2022 20:00) (12 - 34)  SpO2: 100% (28 Jan 2022 20:00) (72% - 100%)      I&O's Detail    27 Jan 2022 07:01  -  28 Jan 2022 07:00  --------------------------------------------------------  IN:    IV PiggyBack: 200 mL    Pantoprazole: 210 mL    PRBCs (Packed Red Blood Cells): 339 mL    Sodium Chloride 0.9% Bolus: 500 mL  Total IN: 1249 mL    OUT:    Stool (mL): 500 mL    Voided (mL): 250 mL  Total OUT: 750 mL    Total NET: 499 mL      28 Jan 2022 07:01  -  28 Jan 2022 20:43  --------------------------------------------------------  IN:    Oral Fluid: 960 mL    Other (mL): 500 mL    Pantoprazole: 20 mL  Total IN: 1480 mL    OUT:    Other (mL): 2000 mL    Stool (mL): 550 mL  Total OUT: 2550 mL    Total NET: -1070 mL        PHYSICAL EXAM:    Constitutional: frail, >>stated age  HEENT:dry, pale MM   Neck: No LAD, No JVD  Respiratory: altagraciat olga  Cardiovascular: S1 and S2   Extremities: chronic change/peripheral edema  Neurological: Arousable    hd cath +         LABS                             8.7    16.09 )-----------( 309      ( 28 Jan 2022 06:50 )             26.4                         9.1    19.95 )-----------( 342      ( 27 Jan 2022 23:52 )             27.6     134    |  102    |  54     ----------------------------<  81        28 Jan 2022 16:04  3.9     |  21     |  4.97     141    |  104    |  52     ----------------------------<  97        27 Jan 2022 04:26  3.4     |  26     |  3.23     139    |  105    |  49     ----------------------------<  170       27 Jan 2022 00:55  2.8     |  23     |  3.18     Ca    8.2        28 Jan 2022 16:04  Ca    7.9        27 Jan 2022 04:26    Phos  3.0       27 Jan 2022 04:26  Phos  4.9       26 Jan 2022 06:21    Mg     1.7       27 Jan 2022 04:26  Mg     2.0       26 Jan 2022 06:21          Urine Studies:          RADIOLOGY & ADDITIONAL STUDIES:

## 2022-01-28 NOTE — PROGRESS NOTE ADULT - SUBJECTIVE AND OBJECTIVE BOX
HPI: The patient a  40 y/o F PMx significant for MRSA bacteremia, COPD, Hypertension, ESRD on hemodialysis (MWF) (kidney bx c/w ANCA vasculitis), cocaine and heroin abuse, depression, GERD, epilepsy, Raynaud's syndrome, Rheumatoid arthritis, SLE, seizure disorder, hospitalized 12/15/21 through 12/18/21 for seizure, presented in the ER via EMS  for AMS. The patient family found her on the floor unresponsive after they lleft her alone for an hour. The patient respond only to painful stimulation. Patient was intubated for a Likely aspiration PNA.      1/24: Patient seen in bed intubated.  She is weaning well.  S/P HD today  1/25: Patient doing well extubated.  No HD today  1/27: Patient is now having Melena and dropped her H & H and had 1 U PRBC  1/28: No more melena, H & H stable, for HD today              PAST MEDICAL & SURGICAL HISTORY:  Rheumatoid arthritis    H/O Raynaud&#x27;s syndrome    Heroin abuse    Smoker    Sepsis    HTN (hypertension)    COPD (chronic obstructive pulmonary disease)    Depression    Epilepsy    GERD (gastroesophageal reflux disease)    Bacteremia    Systemic lupus erythematosus    Aphonia    H/O tubal ligation    H/O appendicitis    Gall bladder stones    H/O tracheostomy    S/P percutaneous endoscopic gastrostomy (PEG) tube placement        FAMILY HISTORY:  Family history of cardiomyopathy (Mother)        Social Hx:    Allergies    morphine (Rash)    Intolerances            ICU Vital Signs Last 24 Hrs  T(C): 36.6 (28 Jan 2022 12:00), Max: 37.3 (27 Jan 2022 16:00)  T(F): 97.8 (28 Jan 2022 12:00), Max: 99.1 (27 Jan 2022 16:00)  HR: 102 (28 Jan 2022 14:00) (86 - 130)  BP: 139/93 (28 Jan 2022 14:00) (111/78 - 161/85)  BP(mean): 105 (28 Jan 2022 14:00) (64 - 109)  ABP: --  ABP(mean): --  RR: 32 (28 Jan 2022 14:00) (10 - 32)  SpO2: 100% (28 Jan 2022 14:00) (72% - 100%)          I&O's Summary    27 Jan 2022 07:01  -  28 Jan 2022 07:00  --------------------------------------------------------  IN: 1249 mL / OUT: 750 mL / NET: 499 mL    28 Jan 2022 07:01  -  28 Jan 2022 14:05  --------------------------------------------------------  IN: 860 mL / OUT: 200 mL / NET: 660 mL                              8.7    16.09 )-----------( 309      ( 28 Jan 2022 06:50 )             26.4       01-27    141  |  104  |  52<H>  ----------------------------<  97  3.4<L>   |  26  |  3.23<H>    Ca    7.9<L>      27 Jan 2022 04:26  Phos  3.0     01-27  Mg     1.7     01-27                      MEDICATIONS  (STANDING):  artificial  tears Solution 1 Drop(s) Both EYES two times a day  atovaquone  Suspension 1500 milliGRAM(s) Oral daily  carvedilol 3.125 milliGRAM(s) Oral every 12 hours  epoetin amee-epbx (RETACRIT) Injectable 36254 Unit(s) IV Push <User Schedule>  fosphenytoin IVPB 200 milliGRAM(s) PE IV Intermittent <User Schedule>  hydroxychloroquine 200 milliGRAM(s) Oral daily  levETIRAcetam  IVPB 500 milliGRAM(s) IV Intermittent at bedtime  pantoprazole    Tablet 40 milliGRAM(s) Oral every 12 hours    MEDICATIONS  (PRN):  acetaminophen  Suppository .. 650 milliGRAM(s) Rectal every 6 hours PRN Temp greater or equal to 38C (100.4F)  albumin human 25% IVPB 50 milliLiter(s) IV Intermittent every 1 hour PRN SBP less than 100  ALBUTerol    90 MICROgram(s) HFA Inhaler 2 Puff(s) Inhalation every 6 hours PRN Shortness of Breath and/or Wheezing  aluminum hydroxide/magnesium hydroxide/simethicone Suspension 30 milliLiter(s) Oral every 4 hours PRN Dyspepsia  hydrALAZINE Injectable 10 milliGRAM(s) IV Push every 6 hours PRN SBP>140 or MAP>100  levETIRAcetam  IVPB 250 milliGRAM(s) IV Intermittent daily PRN After dialysis  melatonin 3 milliGRAM(s) Oral at bedtime PRN Insomnia  metoprolol tartrate Injectable 2.5 milliGRAM(s) IV Push every 6 hours PRN HR.130 sustained  ondansetron Injectable 4 milliGRAM(s) IV Push every 8 hours PRN Nausea and/or Vomiting  simethicone 80 milliGRAM(s) Chew three times a day PRN Gas      DVT Prophylaxis: V    Advanced Directives:  Discussed with:    Visit Information:     ** Time is exclusive of billed procedures and/or teaching and/or routine family updates.

## 2022-01-28 NOTE — PROGRESS NOTE ADULT - ASSESSMENT
Erosive Duodenitis  Hypoxic Respiratory failure  Multiple comorbidities  REC  Conservative GI management  Advance diet as needed  Will follow

## 2022-01-28 NOTE — PROGRESS NOTE ADULT - RS GEN PE MLT RESP DETAILS PC
breath sounds equal/no rales/no rhonchi/no wheezes
breath sounds equal/no rales/no rhonchi/no wheezes
airway patent/breath sounds equal/good air movement/respirations non-labored/clear to auscultation bilaterally
breath sounds equal/no rales/no rhonchi/no wheezes

## 2022-01-28 NOTE — PROGRESS NOTE ADULT - GASTROINTESTINAL DETAILS
soft/no distention
soft/no distention
soft
soft/no distention
soft/no distention/bowel sounds normal/no rebound tenderness/no guarding

## 2022-01-28 NOTE — PROGRESS NOTE ADULT - CARDIOVASCULAR DETAILS
positive S1/positive S2
tachycardia/positive S1/positive S2

## 2022-01-28 NOTE — PROGRESS NOTE ADULT - ASSESSMENT
A/P: 41 year old female with Acute Hypoxic Respiratory Failure from a LLL Pneumonia  ESRD on HD  RA  Seizure disorder  IVDA    Plan:  CCU    PULM: Continue NC O2    Cardio: Tachycardia improved    Renal: HD as per renal    GI: Start Clears, PPI BID, H H Q6h, EGD showed DU erosions and no active bleeding.      ID:  Mepron for PCP prophylaxis     Neuro: Negative LP, continue Keppra for seizure disorder    Hold SQH DVT Prophylaxis because of GIB

## 2022-01-28 NOTE — PROGRESS NOTE ADULT - ASSESSMENT
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  on HD MWF. Recent admission for MRSA bacteremia - permcath was replaced . 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

## 2022-01-29 DIAGNOSIS — I42.9 CARDIOMYOPATHY, UNSPECIFIED: ICD-10-CM

## 2022-01-29 PROCEDURE — 93010 ELECTROCARDIOGRAM REPORT: CPT

## 2022-01-29 PROCEDURE — 99232 SBSQ HOSP IP/OBS MODERATE 35: CPT

## 2022-01-29 RX ORDER — ACETAMINOPHEN 500 MG
650 TABLET ORAL EVERY 6 HOURS
Refills: 0 | Status: DISCONTINUED | OUTPATIENT
Start: 2022-01-29 | End: 2022-02-02

## 2022-01-29 RX ADMIN — Medication 100 MILLIGRAM(S): at 14:48

## 2022-01-29 RX ADMIN — ATOVAQUONE 1500 MILLIGRAM(S): 750 SUSPENSION ORAL at 09:25

## 2022-01-29 RX ADMIN — Medication 3 MILLIGRAM(S): at 20:12

## 2022-01-29 RX ADMIN — Medication 1 DROP(S): at 09:35

## 2022-01-29 RX ADMIN — LEVETIRACETAM 400 MILLIGRAM(S): 250 TABLET, FILM COATED ORAL at 22:00

## 2022-01-29 RX ADMIN — PANTOPRAZOLE SODIUM 40 MILLIGRAM(S): 20 TABLET, DELAYED RELEASE ORAL at 09:25

## 2022-01-29 RX ADMIN — CARVEDILOL PHOSPHATE 3.12 MILLIGRAM(S): 80 CAPSULE, EXTENDED RELEASE ORAL at 09:25

## 2022-01-29 RX ADMIN — FOSPHENYTOIN 108 MILLIGRAM(S) PE: 50 INJECTION INTRAMUSCULAR; INTRAVENOUS at 22:50

## 2022-01-29 RX ADMIN — PANTOPRAZOLE SODIUM 40 MILLIGRAM(S): 20 TABLET, DELAYED RELEASE ORAL at 21:59

## 2022-01-29 RX ADMIN — Medication 650 MILLIGRAM(S): at 12:42

## 2022-01-29 RX ADMIN — Medication 1 DROP(S): at 22:50

## 2022-01-29 RX ADMIN — Medication 200 MILLIGRAM(S): at 09:25

## 2022-01-29 RX ADMIN — Medication 650 MILLIGRAM(S): at 20:12

## 2022-01-29 RX ADMIN — CARVEDILOL PHOSPHATE 3.12 MILLIGRAM(S): 80 CAPSULE, EXTENDED RELEASE ORAL at 22:00

## 2022-01-29 NOTE — PROGRESS NOTE ADULT - PROBLEM SELECTOR PLAN 1
Cardiomyopathy (recent onset) with persistent LV dysfunction on f/u TTE yesterday (global).  Repeat ECG ordered (admission ECG with non-ischemic appearance); continue Coreg; can uptitrate Coreg dose if she has not been hypotensive during HD.  Poor candidate for ischemia evaluation at this time.  Ms. Mane has multiple medical problems and apparently persistent illicit drug abuse.

## 2022-01-29 NOTE — PROGRESS NOTE ADULT - SUBJECTIVE AND OBJECTIVE BOX
REASON FOR VISIT: LV Dysfunction    HPI:    42 y/o chronically-ill woman with a history of COPD, hypertension, ESRD on hemodialysis (kidney bx c/w ANCA vasculitis), cocaine and heroin abuse, depression, GERD, epilepsy, Raynaud's syndrome, Rheumatoid arthritis, SLE, seizure disorder, hospitalized 12/15/21 through 12/18/21 for seizure, presented in the ER on 01/18 via EMS after being found unresponsive (urine tox + for cocaine and THC); diagnosed with sepsis, aspiration.  Her hospitalization was complicated by elevated troponin, respiratory failure (intubated on 1/20/2022), hypotension, GI bleeding (Hgb moses 6.5), and new cardiomyopathy.    01/20: Overnight events noted, patient became hypoxic despite ventimask, gurgling secretions w elevated RR, obtunded. Patient was transferred to CCU and intubated. Became hypotensive before intubation requiring pressors, currently off pressors. Patient remains in CCU, intubated and sedated, tachy in the 120's at bedside, BP 97/72.   1/21/22:  patient is remain intubated , off of pressors , on o2 45%, febrile episodes   1/22/22: patient intubated , sedated   1/23/22:  Patient is intubated awake ,comfortable , smiles on talking , on 40% oxygen , tachycardic   hypertensive , coffee ground NGT  suction   1/24/'22: intubated orients, smiles.  1/25/22:  patient is extubated laying comfortable breathing while supine flat position , persistently tachycardic  denies sob or chest pain   1/26/'22:  extubated, no complaints  1/27/2022:  Comfortable; no chest pain, SOB, or orthopnea.    MEDICATIONS  (STANDING):  artificial  tears Solution 1 Drop(s) Both EYES two times a day  atovaquone  Suspension 1500 milliGRAM(s) Oral daily  carvedilol 3.125 milliGRAM(s) Oral every 12 hours  epoetin amee-epbx (RETACRIT) Injectable 84060 Unit(s) IV Push <User Schedule>  fosphenytoin IVPB 200 milliGRAM(s) PE IV Intermittent <User Schedule>  hydroxychloroquine 200 milliGRAM(s) Oral daily  levETIRAcetam  IVPB 500 milliGRAM(s) IV Intermittent at bedtime  pantoprazole    Tablet 40 milliGRAM(s) Oral every 12 hours    Vital Signs Last 24 Hrs  T(C): 36.6 (29 Jan 2022 09:36), Max: 37.2 (28 Jan 2022 15:55)  T(F): 97.8 (29 Jan 2022 09:36), Max: 98.9 (28 Jan 2022 15:55)  HR: 110 (29 Jan 2022 11:00) (90 - 121)  BP: 119/87 (29 Jan 2022 11:00) (111/62 - 140/92)  BP(mean): 96 (29 Jan 2022 11:00) (74 - 111)  RR: 28 (29 Jan 2022 11:00) (16 - 34)  SpO2: 100% (29 Jan 2022 11:00) (89% - 100%)    Physical Exam   Gen: Chronically-ill appearing and older than stated age, supine/flat in bed and breathing comfortably  HEENT:  Poor dentition  CV: Regular, mild tachycardia  Pulm: Nonlabored, no crackles  Abd: soft, nontender  Extremities: no pedal edema    LABS:              8.7    16.09 )-----------( 309      ( 28 Jan 2022 06:50 )             26.4     134<L>  |  102  |  54<H>  ----------------------------<  81  3.9   |  21<L>  |  4.97<H>    Ca    8.2<L>      28 Jan 2022 16:04    TTE Echo Complete w/o Contrast w/ Doppler (01.19.22 @ 08:38) >   The left ventricle is normal in size. Moderately reduced left ventricular systolic function.  Estimated left ventricular ejection fraction is 40-45 %. Overall LV dysfunction appears global.   Normal appearing right ventricle structure and function.   Normal appearing mitral valve structure and function.   Mild  mitral regurgitation.   Trace tricuspid valve regurgitation is present.   No evidence of pericardial effusion.    12 Lead ECG (01.18.22 @ 14:07):  Sinus tachycardia  Biatrial enlargement  Left ventricular hypertrophy ( Hermilo product )  Abnormal ECG

## 2022-01-29 NOTE — PROGRESS NOTE ADULT - ASSESSMENT
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  on HD MWF. Recent admission for MRSA bacteremia - permcath was replaced . 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

## 2022-01-29 NOTE — PROGRESS NOTE ADULT - ASSESSMENT
42 y/o F PMx significant for MRSA bacteremia, COPD, Hypertension, ESRD on hemodialysis (MWF) (kidney bx c/w ANCA vasculitis), cocaine and heroin abuse, depression, GERD, epilepsy, Raynaud's syndrome, Rheumatoid arthritis, SLE, seizure disorder, hospitalized 12/15/21 through 12/18/21 for seizure, presented in the ER via EMS  for AMS. The patient family found her on the floor unresponsive after they lleft her alone for an hour. The patient respond only to painful stimulation. Patient was intubated for a Likely aspiration PNA.      1. acute hypoxic resp failure due to PNA  - CXR : LLL infiltrate  - continue vanco / zosyn  - mepron for PCP ppx  - discontinue steroids     2. ESRD on hd  - HD per nephro    3. seizure history  - continue keppra  - continue fosphenytoin    4. Rheum arth  - continue plaquenil    5. HTN  - continue coreg  - cntinue hydralazine    6. Anemia   - Hb 6 ---> 8  - GI consult appreciated  - s/p Endo : errosive duodenitis  - continue protonix   - now on regular diet    DVT ppx : heparin     code: FULL

## 2022-01-29 NOTE — PROGRESS NOTE ADULT - SUBJECTIVE AND OBJECTIVE BOX
1/26 : Patient has a week long stay in the ICU. She is now stable for transfer to floor. Patient has extensive history. She is not a good historian she can recall the year but not the president. She is wheelchair bound and has decubitus  ulcer. She is complaining of a sore throat     1/29 : On 1/26 patient was supposed to be transferred out of ICU however she had a drop in Hb and some melana so she was seen by GI had endoscopy and is now stable for DC. She is complaning about her liquid diet and would like regular diet

## 2022-01-29 NOTE — PROGRESS NOTE ADULT - SUBJECTIVE AND OBJECTIVE BOX
Patient is a 41y Female who  tolerating HD    duodenitis + on EGD   uf 1.5 L     1/29  eating good appetite, drinking water increasing UO  pleasant      MEDICATIONS  (STANDING):  artificial  tears Solution 1 Drop(s) Both EYES two times a day  atovaquone  Suspension 1500 milliGRAM(s) Oral daily  carvedilol 3.125 milliGRAM(s) Oral every 12 hours  epoetin amee-epbx (RETACRIT) Injectable 56982 Unit(s) IV Push <User Schedule>  fosphenytoin IVPB 200 milliGRAM(s) PE IV Intermittent <User Schedule>  hydroxychloroquine 200 milliGRAM(s) Oral daily  levETIRAcetam  IVPB 500 milliGRAM(s) IV Intermittent at bedtime  pantoprazole    Tablet 40 milliGRAM(s) Oral every 12 hours      I&O's Detail    28 Jan 2022 07:01  -  29 Jan 2022 07:00  --------------------------------------------------------  IN:    Oral Fluid: 960 mL    Other (mL): 500 mL    Pantoprazole: 20 mL  Total IN: 1480 mL    OUT:    Other (mL): 2000 mL    Stool (mL): 550 mL  Total OUT: 2550 mL    Total NET: -1070 mL    Vital Signs Last 24 Hrs  T(C): 36.6 (29 Jan 2022 09:36), Max: 37.2 (28 Jan 2022 15:55)  T(F): 97.8 (29 Jan 2022 09:36), Max: 98.9 (28 Jan 2022 15:55)  HR: 110 (29 Jan 2022 11:00) (90 - 121)  BP: 119/87 (29 Jan 2022 11:00) (111/62 - 140/92)  BP(mean): 96 (29 Jan 2022 11:00) (74 - 111)  RR: 28 (29 Jan 2022 11:00) (16 - 34)  SpO2: 100% (29 Jan 2022 11:00) (89% - 100%)        PHYSICAL EXAM:    Constitutional: frail, >>stated age  HEENT:dry, pale MM   Neck: No LAD, No JVD  Respiratory: scatt ronchi  Cardiovascular: S1 and S2   Extremities: chronic change/peripheral edema  Neurological: Arousable    hd cath +         LABS                                           8.7    16.09 )-----------( 309      ( 28 Jan 2022 06:50 )             26.4                         9.1    19.95 )-----------( 342      ( 27 Jan 2022 23:52 )             27.6             134    |  102    |  54     ----------------------------<  81        28 Jan 2022 16:04  3.9     |  21     |  4.97     141    |  104    |  52     ----------------------------<  97        27 Jan 2022 04:26  3.4     |  26     |  3.23     139    |  105    |  49     ----------------------------<  170       27 Jan 2022 00:55  2.8     |  23     |  3.18     Ca    8.2        28 Jan 2022 16:04  Ca    7.9        27 Jan 2022 04:26    Phos  3.0       27 Jan 2022 04:26  Phos  4.9       26 Jan 2022 06:21    Mg     1.7       27 Jan 2022 04:26  Mg     2.0       26 Jan 2022 06:21          Urine Studies:          RADIOLOGY & ADDITIONAL STUDIES:

## 2022-01-30 LAB
ANION GAP SERPL CALC-SCNC: 9 MMOL/L — SIGNIFICANT CHANGE UP (ref 5–17)
BUN SERPL-MCNC: 26 MG/DL — HIGH (ref 7–23)
CALCIUM SERPL-MCNC: 8.4 MG/DL — LOW (ref 8.5–10.1)
CHLORIDE SERPL-SCNC: 99 MMOL/L — SIGNIFICANT CHANGE UP (ref 96–108)
CO2 SERPL-SCNC: 24 MMOL/L — SIGNIFICANT CHANGE UP (ref 22–31)
CREAT SERPL-MCNC: 4.75 MG/DL — HIGH (ref 0.5–1.3)
GLUCOSE SERPL-MCNC: 91 MG/DL — SIGNIFICANT CHANGE UP (ref 70–99)
POTASSIUM SERPL-MCNC: 3.8 MMOL/L — SIGNIFICANT CHANGE UP (ref 3.5–5.3)
POTASSIUM SERPL-SCNC: 3.8 MMOL/L — SIGNIFICANT CHANGE UP (ref 3.5–5.3)
SODIUM SERPL-SCNC: 132 MMOL/L — LOW (ref 135–145)

## 2022-01-30 PROCEDURE — 99232 SBSQ HOSP IP/OBS MODERATE 35: CPT

## 2022-01-30 RX ORDER — LEVETIRACETAM 250 MG/1
500 TABLET, FILM COATED ORAL DAILY
Refills: 0 | Status: DISCONTINUED | OUTPATIENT
Start: 2022-01-30 | End: 2022-02-02

## 2022-01-30 RX ORDER — CARVEDILOL PHOSPHATE 80 MG/1
6.25 CAPSULE, EXTENDED RELEASE ORAL EVERY 12 HOURS
Refills: 0 | Status: DISCONTINUED | OUTPATIENT
Start: 2022-01-30 | End: 2022-01-31

## 2022-01-30 RX ADMIN — Medication 100 MILLIGRAM(S): at 04:06

## 2022-01-30 RX ADMIN — Medication 100 MILLIGRAM(S): at 14:30

## 2022-01-30 RX ADMIN — LEVETIRACETAM 500 MILLIGRAM(S): 250 TABLET, FILM COATED ORAL at 21:58

## 2022-01-30 RX ADMIN — Medication 1 DROP(S): at 21:59

## 2022-01-30 RX ADMIN — Medication 200 MILLIGRAM(S): at 08:48

## 2022-01-30 RX ADMIN — Medication 650 MILLIGRAM(S): at 16:04

## 2022-01-30 RX ADMIN — Medication 100 MILLIGRAM(S): at 20:10

## 2022-01-30 RX ADMIN — Medication 3 MILLIGRAM(S): at 20:11

## 2022-01-30 RX ADMIN — ATOVAQUONE 1500 MILLIGRAM(S): 750 SUSPENSION ORAL at 08:48

## 2022-01-30 RX ADMIN — CARVEDILOL PHOSPHATE 6.25 MILLIGRAM(S): 80 CAPSULE, EXTENDED RELEASE ORAL at 21:59

## 2022-01-30 RX ADMIN — PANTOPRAZOLE SODIUM 40 MILLIGRAM(S): 20 TABLET, DELAYED RELEASE ORAL at 08:49

## 2022-01-30 RX ADMIN — Medication 100 MILLIGRAM(S): at 08:48

## 2022-01-30 RX ADMIN — PANTOPRAZOLE SODIUM 40 MILLIGRAM(S): 20 TABLET, DELAYED RELEASE ORAL at 21:58

## 2022-01-30 RX ADMIN — CARVEDILOL PHOSPHATE 6.25 MILLIGRAM(S): 80 CAPSULE, EXTENDED RELEASE ORAL at 08:49

## 2022-01-30 NOTE — PHYSICAL THERAPY INITIAL EVALUATION ADULT - PERTINENT HX OF CURRENT PROBLEM, REHAB EVAL
42 y/o F PMx significant for MRSA bacteremia, COPD, Hypertension, ESRD on hemodialysis (MWF) (kidney bx c/w ANCA vasculitis), cocaine and heroin abuse, depression, GERD, epilepsy, Raynaud's syndrome, Rheumatoid arthritis, SLE, seizure disorder, hospitalized 12/15/21 through 12/18/21 for seizure, presented in the ER via EMS  for AMS. The patient family found her on the floor unresponsive after they lleft her alone for an hour.

## 2022-01-30 NOTE — PHYSICAL THERAPY INITIAL EVALUATION ADULT - RANGE OF MOTION EXAMINATION, REHAB EVAL
except left knee ext lacking ~30 deg to neutral/bilateral upper extremity ROM was WFL (within functional limits)/bilateral lower extremity ROM was WFL (within functional limits)

## 2022-01-30 NOTE — PROGRESS NOTE ADULT - ATTENDING COMMENTS
Multiple comorbidities and without cardiac complaints -- new, persistent LV dysfunction (moderate); Coreg dose uptitrated.

## 2022-01-30 NOTE — PROGRESS NOTE ADULT - SUBJECTIVE AND OBJECTIVE BOX
1/26 : Patient has a week long stay in the ICU. She is now stable for transfer to floor. Patient has extensive history. She is not a good historian she can recall the year but not the president. She is wheelchair bound and has decubitus  ulcer. She is complaining of a sore throat     1/29 : On 1/26 patient was supposed to be transferred out of ICU however she had a drop in Hb and some melana so she was seen by GI had endoscopy and is now stable for DC. She is complaning about her liquid diet and would like regular diet     1/30 : Patient has a terrible memory. She does not have the support in her house to take care of her. Will discuss with case management about options moving forward . I have discontinued her fosphentoin. Will continue keppra

## 2022-01-30 NOTE — PROGRESS NOTE ADULT - PROBLEM SELECTOR PLAN 1
Cardiomyopathy (recent onset) with persistent LV dysfunction on f/u TTE yesterday (global).  Repeat ECG ordered (admission ECG with non-ischemic appearance); continue Coreg; dose increased to 6.25mg BID.  Poor candidate for ischemia evaluation at this time.  Ms. Mane has multiple medical problems and apparently persistent illicit drug abuse.

## 2022-01-30 NOTE — PROGRESS NOTE ADULT - SUBJECTIVE AND OBJECTIVE BOX
REASON FOR VISIT: LV Dysfunction    HPI:    42 y/o chronically-ill woman with a history of COPD, hypertension, ESRD on hemodialysis (kidney bx c/w ANCA vasculitis), cocaine and heroin abuse, depression, GERD, epilepsy, Raynaud's syndrome, Rheumatoid arthritis, SLE, seizure disorder, hospitalized 12/15/21 through 12/18/21 for seizure, presented in the ER on 01/18 via EMS after being found unresponsive (urine tox + for cocaine and THC); diagnosed with sepsis, aspiration.  Her hospitalization was complicated by elevated troponin, respiratory failure (intubated on 1/20/2022), hypotension, GI bleeding (Hgb moses 6.5), and new cardiomyopathy.    01/20: Overnight events noted, patient became hypoxic despite ventimask, gurgling secretions w elevated RR, obtunded. Patient was transferred to CCU and intubated. Became hypotensive before intubation requiring pressors, currently off pressors. Patient remains in CCU, intubated and sedated, tachy in the 120's at bedside, BP 97/72.   1/21/22:  patient is remain intubated , off of pressors , on o2 45%, febrile episodes   1/22/22: patient intubated , sedated   1/23/22:  Patient is intubated awake ,comfortable , smiles on talking , on 40% oxygen , tachycardic   hypertensive , coffee ground NGT  suction   1/24/'22: intubated orients, smiles.  1/25/22:  patient is extubated laying comfortable breathing while supine flat position , persistently tachycardic  denies sob or chest pain   1/26/'22:  extubated, no complaints  1/29/2022:  Comfortable; no chest pain, SOB, or orthopnea.  1/30/22:     MEDICATIONS  (STANDING):  artificial  tears Solution 1 Drop(s) Both EYES two times a day  atovaquone  Suspension 1500 milliGRAM(s) Oral daily  carvedilol 3.125 milliGRAM(s) Oral every 12 hours  epoetin amee-epbx (RETACRIT) Injectable 14325 Unit(s) IV Push <User Schedule>  fosphenytoin IVPB 200 milliGRAM(s) PE IV Intermittent <User Schedule>  hydroxychloroquine 200 milliGRAM(s) Oral daily  levETIRAcetam  IVPB 500 milliGRAM(s) IV Intermittent at bedtime  pantoprazole    Tablet 40 milliGRAM(s) Oral every 12 hours    Vital Signs Last 24 Hrs  T(C): 36.6 (30 Jan 2022 08:15), Max: 37.6 (29 Jan 2022 17:32)  T(F): 97.9 (30 Jan 2022 08:15), Max: 99.7 (29 Jan 2022 17:32)  HR: 107 (30 Jan 2022 08:15) (100 - 110)  BP: 124/91 (30 Jan 2022 08:15) (119/87 - 148/85)  BP(mean): 99 (29 Jan 2022 14:00) (96 - 110)  RR: 20 (30 Jan 2022 08:15) (15 - 28)  SpO2: 100% (30 Jan 2022 08:15) (100% - 100%)    Physical Exam   Gen: Chronically-ill appearing and older than stated age, supine/flat in bed and breathing comfortably  HEENT:  Poor dentition  CV: Regular, mild tachycardia  Pulm: Nonlabored, no crackles  Abd: soft, nontender  Extremities: no pedal edema    LABS: All Labs Reviewed:                        7.9    12.88 )-----------( 285      ( 29 Jan 2022 16:26 )             24.1     01-30    132<L>  |  99  |  26<H>  ----------------------------<  91  3.8   |  24  |  4.75<H>    Ca    8.4<L>      30 Jan 2022 08:19  Phos  3.4     01-29  Mg     1.9     01-29      TTE Echo Complete w/o Contrast w/ Doppler (01.19.22 @ 08:38) >   The left ventricle is normal in size. Moderately reduced left ventricular systolic function.  Estimated left ventricular ejection fraction is 40-45 %. Overall LV dysfunction appears global.   Normal appearing right ventricle structure and function.   Normal appearing mitral valve structure and function.   Mild  mitral regurgitation.   Trace tricuspid valve regurgitation is present.   No evidence of pericardial effusion.    12 Lead ECG (01.18.22 @ 14:07):  Sinus tachycardia  Biatrial enlargement  Left ventricular hypertrophy ( [place] product )  Abnormal ECG   REASON FOR VISIT: LV Dysfunction    HPI:    40 y/o chronically-ill woman with a history of COPD, hypertension, ESRD on hemodialysis (kidney bx c/w ANCA vasculitis), cocaine and heroin abuse, depression, GERD, epilepsy, Raynaud's syndrome, Rheumatoid arthritis, SLE, seizure disorder, hospitalized 12/15/21 through 12/18/21 for seizure, presented in the ER on 01/18 via EMS after being found unresponsive (urine tox + for cocaine and THC); diagnosed with sepsis, aspiration.  Her hospitalization was complicated by elevated troponin, respiratory failure (intubated on 1/20/2022), hypotension, GI bleeding (Hgb moses 6.5), and new cardiomyopathy.    01/20: Overnight events noted, patient became hypoxic despite ventimask, gurgling secretions w elevated RR, obtunded. Patient was transferred to CCU and intubated. Became hypotensive before intubation requiring pressors, currently off pressors. Patient remains in CCU, intubated and sedated, tachy in the 120's at bedside, BP 97/72.   1/21/22:  patient is remain intubated , off of pressors , on o2 45%, febrile episodes   1/22/22: patient intubated , sedated   1/23/22:  Patient is intubated awake ,comfortable , smiles on talking , on 40% oxygen , tachycardic   hypertensive , coffee ground NGT  suction   1/24/'22: intubated orients, smiles.  1/25/22:  patient is extubated laying comfortable breathing while supine flat position , persistently tachycardic  denies sob or chest pain   1/26/'22:  extubated, no complaints  1/29/2022:  Comfortable; no chest pain, SOB, or orthopnea.  1/30/22: comfortable, no complaints, no CP no SOB or orthopnea, on Nasal cannula     MEDICATIONS  (STANDING):  artificial  tears Solution 1 Drop(s) Both EYES two times a day  atovaquone  Suspension 1500 milliGRAM(s) Oral daily  carvedilol 3.125 milliGRAM(s) Oral every 12 hours  epoetin amee-epbx (RETACRIT) Injectable 60780 Unit(s) IV Push <User Schedule>  fosphenytoin IVPB 200 milliGRAM(s) PE IV Intermittent <User Schedule>  hydroxychloroquine 200 milliGRAM(s) Oral daily  levETIRAcetam  IVPB 500 milliGRAM(s) IV Intermittent at bedtime  pantoprazole    Tablet 40 milliGRAM(s) Oral every 12 hours    Vital Signs Last 24 Hrs  T(C): 36.6 (30 Jan 2022 08:15), Max: 37.6 (29 Jan 2022 17:32)  T(F): 97.9 (30 Jan 2022 08:15), Max: 99.7 (29 Jan 2022 17:32)  HR: 107 (30 Jan 2022 08:15) (100 - 110)  BP: 124/91 (30 Jan 2022 08:15) (119/87 - 148/85)  BP(mean): 99 (29 Jan 2022 14:00) (96 - 110)  RR: 20 (30 Jan 2022 08:15) (15 - 28)  SpO2: 100% (30 Jan 2022 08:15) (100% - 100%)    Physical Exam   Gen: Chronically-ill appearing and older than stated age, supine/flat in bed and breathing comfortably  HEENT:  Poor dentition  CV: Regular, mild tachycardia  Pulm: Nonlabored, no crackles  Abd: soft, nontender  Extremities: no pedal edema    LABS: All Labs Reviewed:                        7.9    12.88 )-----------( 285      ( 29 Jan 2022 16:26 )             24.1     01-30    132<L>  |  99  |  26<H>  ----------------------------<  91  3.8   |  24  |  4.75<H>    Ca    8.4<L>      30 Jan 2022 08:19  Phos  3.4     01-29  Mg     1.9     01-29      TTE Echo Complete w/o Contrast w/ Doppler (01.19.22 @ 08:38) >   The left ventricle is normal in size. Moderately reduced left ventricular systolic function.  Estimated left ventricular ejection fraction is 40-45 %. Overall LV dysfunction appears global.   Normal appearing right ventricle structure and function.   Normal appearing mitral valve structure and function.   Mild  mitral regurgitation.   Trace tricuspid valve regurgitation is present.   No evidence of pericardial effusion.    12 Lead ECG (01.18.22 @ 14:07):  Sinus tachycardia  Biatrial enlargement  Left ventricular hypertrophy ( [place] product )  Abnormal ECG

## 2022-01-30 NOTE — PROGRESS NOTE ADULT - ASSESSMENT
42 y/o F PMx significant for MRSA bacteremia, COPD, Hypertension, ESRD on hemodialysis (MWF) (kidney bx c/w ANCA vasculitis), cocaine and heroin abuse, depression, GERD, epilepsy, Raynaud's syndrome, Rheumatoid arthritis, SLE, seizure disorder, hospitalized 12/15/21 through 12/18/21 for seizure, presented in the ER via EMS  for AMS. The patient family found her on the floor unresponsive after they lleft her alone for an hour. The patient respond only to painful stimulation. Patient was intubated for a Likely aspiration PNA.      1. acute hypoxic resp failure due to PNA  - CXR : LLL infiltrate  - continue vanco / zosyn  - mepron for PCP ppx  - discontinue steroids     2. ESRD on hd  - HD per nephro    3. seizure history  - continue keppra  - discontinue fosphenytoin  - neuro following     4. Rheum arth  - continue plaquenil    5. HTN  - continue coreg  - cntinue hydralazine    6. Anemia   - Hb 6 ---> 8  - GI consult appreciated  - s/p Endo : errosive duodenitis  - continue protonix   - now on regular diet  - continue EPO     DVT ppx : heparin     code: FULL

## 2022-01-31 LAB
ALBUMIN SERPL ELPH-MCNC: 2 G/DL — LOW (ref 3.3–5)
ANION GAP SERPL CALC-SCNC: 13 MMOL/L — SIGNIFICANT CHANGE UP (ref 5–17)
BASOPHILS # BLD AUTO: 0.05 K/UL — SIGNIFICANT CHANGE UP (ref 0–0.2)
BASOPHILS NFR BLD AUTO: 0.5 % — SIGNIFICANT CHANGE UP (ref 0–2)
BUN SERPL-MCNC: 32 MG/DL — HIGH (ref 7–23)
CALCIUM SERPL-MCNC: 8.4 MG/DL — LOW (ref 8.5–10.1)
CHLORIDE SERPL-SCNC: 98 MMOL/L — SIGNIFICANT CHANGE UP (ref 96–108)
CO2 SERPL-SCNC: 22 MMOL/L — SIGNIFICANT CHANGE UP (ref 22–31)
CREAT SERPL-MCNC: 6.12 MG/DL — HIGH (ref 0.5–1.3)
EOSINOPHIL # BLD AUTO: 0.21 K/UL — SIGNIFICANT CHANGE UP (ref 0–0.5)
EOSINOPHIL NFR BLD AUTO: 2.2 % — SIGNIFICANT CHANGE UP (ref 0–6)
GLUCOSE SERPL-MCNC: 155 MG/DL — HIGH (ref 70–99)
HCT VFR BLD CALC: 23.5 % — LOW (ref 34.5–45)
HGB BLD-MCNC: 7.7 G/DL — LOW (ref 11.5–15.5)
IMM GRANULOCYTES NFR BLD AUTO: 1.4 % — SIGNIFICANT CHANGE UP (ref 0–1.5)
LYMPHOCYTES # BLD AUTO: 1.34 K/UL — SIGNIFICANT CHANGE UP (ref 1–3.3)
LYMPHOCYTES # BLD AUTO: 14.1 % — SIGNIFICANT CHANGE UP (ref 13–44)
MCHC RBC-ENTMCNC: 30 PG — SIGNIFICANT CHANGE UP (ref 27–34)
MCHC RBC-ENTMCNC: 32.8 GM/DL — SIGNIFICANT CHANGE UP (ref 32–36)
MCV RBC AUTO: 91.4 FL — SIGNIFICANT CHANGE UP (ref 80–100)
MONOCYTES # BLD AUTO: 0.9 K/UL — SIGNIFICANT CHANGE UP (ref 0–0.9)
MONOCYTES NFR BLD AUTO: 9.5 % — SIGNIFICANT CHANGE UP (ref 2–14)
NEUTROPHILS # BLD AUTO: 6.87 K/UL — SIGNIFICANT CHANGE UP (ref 1.8–7.4)
NEUTROPHILS NFR BLD AUTO: 72.3 % — SIGNIFICANT CHANGE UP (ref 43–77)
PHOSPHATE SERPL-MCNC: 6.1 MG/DL — HIGH (ref 2.5–4.5)
PLATELET # BLD AUTO: 261 K/UL — SIGNIFICANT CHANGE UP (ref 150–400)
POTASSIUM SERPL-MCNC: 3.5 MMOL/L — SIGNIFICANT CHANGE UP (ref 3.5–5.3)
POTASSIUM SERPL-SCNC: 3.5 MMOL/L — SIGNIFICANT CHANGE UP (ref 3.5–5.3)
RBC # BLD: 2.57 M/UL — LOW (ref 3.8–5.2)
RBC # FLD: 14.9 % — HIGH (ref 10.3–14.5)
SARS-COV-2 RNA SPEC QL NAA+PROBE: SIGNIFICANT CHANGE UP
SODIUM SERPL-SCNC: 133 MMOL/L — LOW (ref 135–145)
WBC # BLD: 9.5 K/UL — SIGNIFICANT CHANGE UP (ref 3.8–10.5)
WBC # FLD AUTO: 9.5 K/UL — SIGNIFICANT CHANGE UP (ref 3.8–10.5)

## 2022-01-31 PROCEDURE — 99232 SBSQ HOSP IP/OBS MODERATE 35: CPT

## 2022-01-31 RX ORDER — METOPROLOL TARTRATE 50 MG
50 TABLET ORAL ONCE
Refills: 0 | Status: COMPLETED | OUTPATIENT
Start: 2022-01-31 | End: 2022-01-31

## 2022-01-31 RX ORDER — METOPROLOL TARTRATE 50 MG
50 TABLET ORAL
Refills: 0 | Status: DISCONTINUED | OUTPATIENT
Start: 2022-01-31 | End: 2022-02-02

## 2022-01-31 RX ORDER — COLLAGENASE CLOSTRIDIUM HIST. 250 UNIT/G
1 OINTMENT (GRAM) TOPICAL DAILY
Refills: 0 | Status: DISCONTINUED | OUTPATIENT
Start: 2022-01-31 | End: 2022-02-02

## 2022-01-31 RX ORDER — SEVELAMER CARBONATE 2400 MG/1
800 POWDER, FOR SUSPENSION ORAL
Refills: 0 | Status: DISCONTINUED | OUTPATIENT
Start: 2022-01-31 | End: 2022-02-02

## 2022-01-31 RX ADMIN — ATOVAQUONE 1500 MILLIGRAM(S): 750 SUSPENSION ORAL at 12:16

## 2022-01-31 RX ADMIN — Medication 650 MILLIGRAM(S): at 16:54

## 2022-01-31 RX ADMIN — Medication 1 DROP(S): at 12:17

## 2022-01-31 RX ADMIN — Medication 3 MILLIGRAM(S): at 20:25

## 2022-01-31 RX ADMIN — ERYTHROPOIETIN 10000 UNIT(S): 10000 INJECTION, SOLUTION INTRAVENOUS; SUBCUTANEOUS at 08:12

## 2022-01-31 RX ADMIN — Medication 1 APPLICATION(S): at 16:53

## 2022-01-31 RX ADMIN — Medication 100 MILLIGRAM(S): at 16:56

## 2022-01-31 RX ADMIN — LEVETIRACETAM 500 MILLIGRAM(S): 250 TABLET, FILM COATED ORAL at 20:25

## 2022-01-31 RX ADMIN — Medication 650 MILLIGRAM(S): at 17:09

## 2022-01-31 RX ADMIN — Medication 200 MILLIGRAM(S): at 12:16

## 2022-01-31 RX ADMIN — Medication 50 MILLIGRAM(S): at 20:25

## 2022-01-31 RX ADMIN — Medication 50 MILLIGRAM(S): at 14:33

## 2022-01-31 RX ADMIN — SEVELAMER CARBONATE 800 MILLIGRAM(S): 2400 POWDER, FOR SUSPENSION ORAL at 19:04

## 2022-01-31 RX ADMIN — Medication 1 DROP(S): at 20:26

## 2022-01-31 RX ADMIN — PANTOPRAZOLE SODIUM 40 MILLIGRAM(S): 20 TABLET, DELAYED RELEASE ORAL at 12:16

## 2022-01-31 RX ADMIN — PANTOPRAZOLE SODIUM 40 MILLIGRAM(S): 20 TABLET, DELAYED RELEASE ORAL at 20:25

## 2022-01-31 NOTE — PROGRESS NOTE ADULT - SUBJECTIVE AND OBJECTIVE BOX
1/26 : Patient has a week long stay in the ICU. She is now stable for transfer to floor. Patient has extensive history. She is not a good historian she can recall the year but not the president. She is wheelchair bound and has decubitus  ulcer. She is complaining of a sore throat     1/29 : On 1/26 patient was supposed to be transferred out of ICU however she had a drop in Hb and some melana so she was seen by GI had endoscopy and is now stable for DC. She is complaning about her liquid diet and would like regular diet     1/30 : Patient has a terrible memory. She does not have the support in her house to take care of her. Will discuss with case management about options moving forward . I have discontinued her fosphentoin. Will continue keppra     1/31 : Patients mentals status is horrendous. She cannot recall day to day events. Placement for her will be an issue that she is ESRD on HD and has history of drug abuse. Will discuss with case management about options for her moving forward . Scarlett changed her coreg to metoprolol due to persistent tachycardia.

## 2022-01-31 NOTE — PROGRESS NOTE ADULT - ASSESSMENT
40 y/o F PMx significant for MRSA bacteremia, COPD, Hypertension, ESRD on hemodialysis (MWF) (kidney bx c/w ANCA vasculitis), cocaine and heroin abuse, depression, GERD, epilepsy, Raynaud's syndrome, Rheumatoid arthritis, SLE, seizure disorder, hospitalized 12/15/21 through 12/18/21 for seizure, presented in the ER via EMS  for AMS. The patient family found her on the floor unresponsive after they lleft her alone for an hour. The patient respond only to painful stimulation. Patient was intubated for a Likely aspiration PNA.      1. acute hypoxic resp failure due to PNA  - CXR : LLL infiltrate  - continue vanco / zosyn  - mepron for PCP ppx  - discontinue steroids     2. ESRD on hd  - HD per nephro    3. seizure history  - continue keppra  - neuro following     4. Rheum arth  - continue plaquenil    5. HTN  - changed coreg to metoprolol due to persistant tachycardia   - cntinue hydralazine    6. Anemia   - Hb 6 ---> 8  - GI consult appreciated  - s/p Endo : errosive duodenitis  - continue protonix   - now on regular diet  - continue EPO     DVT ppx : heparin     code: FULL

## 2022-01-31 NOTE — PROGRESS NOTE ADULT - ASSESSMENT
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  on HD MWF. Recent admission for MRSA bacteremia - permcath was replaced . 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   HD MWF  cultures negative     K bath - keep near 4         Anemia w now acute blood loss/melena - erosive duodenitis    hb trending down - PRBC with HD today    PPI     TAMARA at HD    IV iron at HD once acute issues resolved     Hyperphosphatemia   sevalmer - phos binder with each meal    renal diet     Dispo:  hx of drug use and right now does not have stable home support system   high risk for readmission      d/w HD RN       1/29  Doing well  excellent UO  check labs am and Monday for recovery of renal function        40 yo male with hx SLE, HTN, Raynaud's, 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  on HD MWF. Recent admission for MRSA bacteremia - permcath was replaced . 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

## 2022-01-31 NOTE — ADVANCED PRACTICE NURSE CONSULT - ASSESSMENT
This is a 41 year old female admitted to the hospital on 1/18/2022 for Sepsis. PMH: Aphonia Lupus, Bacteriemia GERD, Epilepsy. Depression, COPD, Sepsis. Smoker, HTN, Smoker, Heroin abuse, Rheumatoid.   Assessed patient on 2 SW and patient is well know to [facility]. Wound to sacrum measures 4cm x 3cm x 0.4cm with 100% yellow slough. Applying collagenase with Aquacel covering with foam for enzymatic debridement Periwound intact and hyperpigment. patient is able to walk and position self.

## 2022-02-01 ENCOUNTER — TRANSCRIPTION ENCOUNTER (OUTPATIENT)
Age: 42
End: 2022-02-01

## 2022-02-01 PROCEDURE — 90792 PSYCH DIAG EVAL W/MED SRVCS: CPT

## 2022-02-01 PROCEDURE — 99232 SBSQ HOSP IP/OBS MODERATE 35: CPT

## 2022-02-01 RX ORDER — METOPROLOL TARTRATE 50 MG
1 TABLET ORAL
Qty: 60 | Refills: 0
Start: 2022-02-01 | End: 2022-03-02

## 2022-02-01 RX ORDER — LEVETIRACETAM 250 MG/1
1 TABLET, FILM COATED ORAL
Qty: 13 | Refills: 0
Start: 2022-02-01 | End: 2022-03-02

## 2022-02-01 RX ORDER — LEVETIRACETAM 250 MG/1
1 TABLET, FILM COATED ORAL
Qty: 30 | Refills: 0
Start: 2022-02-01 | End: 2022-03-02

## 2022-02-01 RX ORDER — SEVELAMER CARBONATE 2400 MG/1
1 POWDER, FOR SUSPENSION ORAL
Qty: 90 | Refills: 0
Start: 2022-02-01 | End: 2022-03-02

## 2022-02-01 RX ORDER — NICOTINE POLACRILEX 2 MG
1 GUM BUCCAL
Qty: 30 | Refills: 0
Start: 2022-02-01 | End: 2022-03-02

## 2022-02-01 RX ORDER — LISINOPRIL 2.5 MG/1
1 TABLET ORAL
Qty: 30 | Refills: 0
Start: 2022-02-01 | End: 2022-03-02

## 2022-02-01 RX ADMIN — Medication 50 MILLIGRAM(S): at 21:09

## 2022-02-01 RX ADMIN — PANTOPRAZOLE SODIUM 40 MILLIGRAM(S): 20 TABLET, DELAYED RELEASE ORAL at 08:39

## 2022-02-01 RX ADMIN — Medication 650 MILLIGRAM(S): at 20:42

## 2022-02-01 RX ADMIN — Medication 100 MILLIGRAM(S): at 15:18

## 2022-02-01 RX ADMIN — Medication 100 MILLIGRAM(S): at 23:03

## 2022-02-01 RX ADMIN — SEVELAMER CARBONATE 800 MILLIGRAM(S): 2400 POWDER, FOR SUSPENSION ORAL at 17:12

## 2022-02-01 RX ADMIN — Medication 650 MILLIGRAM(S): at 08:39

## 2022-02-01 RX ADMIN — Medication 200 MILLIGRAM(S): at 08:40

## 2022-02-01 RX ADMIN — Medication 1 DROP(S): at 23:03

## 2022-02-01 RX ADMIN — PANTOPRAZOLE SODIUM 40 MILLIGRAM(S): 20 TABLET, DELAYED RELEASE ORAL at 21:08

## 2022-02-01 RX ADMIN — Medication 3 MILLIGRAM(S): at 20:51

## 2022-02-01 RX ADMIN — Medication 50 MILLIGRAM(S): at 08:39

## 2022-02-01 RX ADMIN — SEVELAMER CARBONATE 800 MILLIGRAM(S): 2400 POWDER, FOR SUSPENSION ORAL at 07:50

## 2022-02-01 RX ADMIN — Medication 100 MILLIGRAM(S): at 08:41

## 2022-02-01 RX ADMIN — ATOVAQUONE 1500 MILLIGRAM(S): 750 SUSPENSION ORAL at 08:40

## 2022-02-01 RX ADMIN — LEVETIRACETAM 500 MILLIGRAM(S): 250 TABLET, FILM COATED ORAL at 23:01

## 2022-02-01 RX ADMIN — Medication 1 DROP(S): at 08:41

## 2022-02-01 RX ADMIN — Medication 1 APPLICATION(S): at 08:41

## 2022-02-01 RX ADMIN — SEVELAMER CARBONATE 800 MILLIGRAM(S): 2400 POWDER, FOR SUSPENSION ORAL at 11:15

## 2022-02-01 NOTE — DISCHARGE NOTE PROVIDER - NSDCCPCAREPLAN_GEN_ALL_CORE_FT
PRINCIPAL DISCHARGE DIAGNOSIS  Diagnosis: Sepsis  Assessment and Plan of Treatment: completed antibiotics      SECONDARY DISCHARGE DIAGNOSES  Diagnosis: Cardiomyopathy  Assessment and Plan of Treatment: please take your metoprolol and lisinopril and see cardiology in a week

## 2022-02-01 NOTE — BEHAVIORAL HEALTH ASSESSMENT NOTE - NSBHCHARTREVIEWINVESTIGATE_PSY_A_CORE FT
Ventricular Rate 116 BPM    Atrial Rate 116 BPM    P-R Interval 134 ms    QRS Duration 88 ms    Q-T Interval 344 ms    QTC Calculation(Bazett) 478 ms    P Axis 84 degrees    R Axis 69 degrees    T Axis 81 degrees    Diagnosis Line Sinus tachycardia  Biatrial enlargement  Left ventricular hypertrophy ( [place] product )  Abnormal ECG  When compared with ECG of 15-DEC-2021 13:44,  Nonspecific T wave abnormality has replaced inverted T waves in Lateral leads  Confirmed by YANA WHEATLEY, DANIELLE SYLVESTER ([id number]) on 1/20/2022 9:41:20 AM

## 2022-02-01 NOTE — DISCHARGE NOTE PROVIDER - DETAILS OF MALNUTRITION DIAGNOSIS/DIAGNOSES
This patient has been assessed with a concern for Malnutrition and was treated during this hospitalization for the following Nutrition diagnosis/diagnoses:     -  01/20/2022: Severe protein-calorie malnutrition   -  01/20/2022: Underweight (BMI < 19)

## 2022-02-01 NOTE — DISCHARGE NOTE PROVIDER - HOSPITAL COURSE
42 y/o F with PMH significant for MRSA bacteremia, COPD, Hypertension, ESRD on hemodialysis (MWF) (kidney bx c/w ANCA vasculitis),   cocaine and heroin abuse, depression, GERD, epilepsy, Raynaud's syndrome, Rheumatoid arthritis,   SLE, seizure disorder, hospitalized 12/15/21 through 12/18/21 for seizure,   presented in the ER via EMS  for AMS.   The patient family found her on the floor unresponsive after they left her alone for an hour.   The patient respond only to painful stimulation. Patient was intubated for a Likely aspiration PNA.        HOSPITAL COURSE:  1. acute hypoxic resp failure   sepsis due to aspiration  PNA  h/o CDAD  - CXR : LLL infiltrate  - s/p 7d vanco / zosyn  - mepron for PCP ppx  - LP negative for infection     2. ESRD on HD  - HD per nephro, pt sees Dr Pena    3. seizure history  - continue keppra  - neuro following - rec d/c Dilantin and restart Keppra. Can give 500 mg daily and 250 mg additional after dialysis.    4. Rheum arth  - continue plaquenil    5. HTN  - pt metoprolol due to persistent tachycardia   - continue hydralazine, add lisinopril     6. Anemia   - Hb 6 ---> 8  - GI consult appreciated  - s/p EGD : erosive duodenitis  - continue protonix   - now on regular diet  - continue EPO     7. Cardiomyopathy (recent onset) with persistent LV dysfunction on f/u TTE yesterday (global).    Repeat ECG ordered (admission ECG with non-ischemic appearance); change   Poor candidate for ischemia evaluation at this time.   cont BB and add acei   see Dr Kirby in one week     8- Nicotine addiction  Polysubstance abuse   rec nicotine patch or gum     9 Pt has severe deconditioning  seen by PT and they rec home PT at this time      OTHER DETAILS:  2/1 - no cp palps sob; my first time seeing the patient but she is lucid, conversant and makes sense.     PHYSICAL EXAM:  Vital Signs Last 24 Hrs  T(F): 99.1 (01 Feb 2022 08:30), Max: 100.1 (31 Jan 2022 15:45)  HR: 114 (01 Feb 2022 08:30) (92 - 114)  BP: 125/91 (01 Feb 2022 08:30) (113/97 - 135/73)  RR: 18 (01 Feb 2022 08:30) (18 - 18)  SpO2: 98% (01 Feb 2022 08:30) (98% - 100%)  GENERAL: NAD, able to lie flat in bed  HEAD:  Atraumatic, Normocephalic  EYES: EOMI, PERRLA, normal sclera  ENT: Moist mucous membranes  NECK: Supple, No JVD, no nuchal rigidity  CHEST/LUNG: Clear to auscultation bilaterally; No rales, rhonchi, wheezing, or rubs. Unlabored respirations  HEART: Regular rate and rhythm; No murmurs, rubs, or gallops  ABDOMEN: Bowel sounds present; Soft, Nontender, Nondistended. No hepatomegaly  EXTREMITIES:  no pitting bilaterally  NERVOUS SYSTEM:  Alert & Oriented X3, speech clear. No focal motor or sensory deficits  MSK: FROM all 4 extremities, full and equal strength    LABS: All Labs Reviewed:                     7.7    9.50  )-----------( 261      ( 31 Jan 2022 07:54 )             23.5   133<L>  |  98  |  32<H>  ----------------------------<  155<H>  3.5   |  22  |  6.12<H>  Ca   8.4<L>      31 Jan 2022 07:54  Phos  6.1     01-31  TPro  x   /  Alb  2.0<L>  /  TBili  x   /  DBili  x   /  AST  x   /  ALT  x   /  AlkPhos  x   01-31    RADIOLOGY/EKG:  < from: Xray Chest 1 View- PORTABLE-Urgent (Xray Chest 1 View- PORTABLE-Urgent .) (01.24.22 @ 09:39) >  IMPRESSION: No evidence for focal infiltrate or lobar consolidation.    discussed with RN   time spent on discharge - 55 mins   discussed with Rusty Pena and Kofi                  42 y/o F with PMH significant for MRSA bacteremia, COPD, Hypertension, ESRD on hemodialysis (MWF) (kidney bx c/w ANCA vasculitis),   cocaine and heroin abuse, depression, GERD, epilepsy, Raynaud's syndrome, Rheumatoid arthritis,   SLE, seizure disorder, hospitalized 12/15/21 through 12/18/21 for seizure,   presented in the ER via EMS  for AMS.   The patient family found her on the floor unresponsive after they left her alone for an hour.   The patient respond only to painful stimulation. Patient was intubated for a Likely aspiration PNA.      HOSPITAL COURSE:  1. acute hypoxic resp failure   sepsis due to aspiration  PNA  h/o CDAD  - CXR : LLL infiltrate - s/p 7d vanco / zosyn  - mepron for PCP ppx  - LP negative for infection     2. ESRD on HD  - HD per nephro, pt sees Dr Pena    3. seizure history  - continue keppra  - neuro following - rec d/c Dilantin and restart Keppra. Can give 500 mg daily and 250 mg additional after dialysis.    4. Rheum arth  - continue plaquenil    5. HTN  - pt metoprolol due to persistent tachycardia   - continue hydralazine, add lisinopril     6. Anemia  Hb 6 ---> 8  - GI consult appreciated  - s/p EGD : erosive duodenitis  - continue protonix   - now on regular diet  - continue EPO     7. Cardiomyopathy (recent onset) with persistent LV dysfunction on f/u TTE yesterday (global).    Repeat ECG ordered (admission ECG with non-ischemic appearance); change   Poor candidate for ischemia evaluation at this time.   cont BB and add acei   see Dr Kirby in one week     8- Nicotine addiction  Polysubstance abuse   rec nicotine patch or gum     9 Pt has severe deconditioning  seen by PT and they rec home PT at this time    OTHER DETAILS:  2/2 - no cp palps sob; undergoing HD, no complaints, still urinates about three times a day and denies dysuria; regular bms     PHYSICAL EXAM:  Vital Signs Last 24 Hrs  T(C): 37.6 (02 Feb 2022 17:42), Max: 37.6 (02 Feb 2022 16:13)  T(F): 99.6 (02 Feb 2022 17:42), Max: 99.6 (02 Feb 2022 16:13)  HR: 108 (02 Feb 2022 17:42) (91 - 124)  BP: 112/73 (02 Feb 2022 17:42) (110/82 - 138/99)  GENERAL: NAD, able to lie flat in bed  HEAD:  Atraumatic, Normocephalic  EYES: EOMI, PERRLA, normal sclera  ENT: Moist mucous membranes  NECK: Supple, No JVD, no nuchal rigidity  CHEST/LUNG: Clear to auscultation bilaterally; No rales, rhonchi, wheezing, or rubs. Unlabored respirations  HEART: Regular rate and rhythm; No murmurs, rubs, or gallops  ABDOMEN: Bowel sounds present; Soft, Nontender, Nondistended. No hepatomegaly  EXTREMITIES:  no pitting bilaterally  NERVOUS SYSTEM:  Alert & Oriented X3, speech clear. No focal motor or sensory deficits  MSK: FROM all 4 extremities, full and equal strength    RADIOLOGY/EKG:  < from: Xray Chest 1 View- PORTABLE-Urgent (Xray Chest 1 View- PORTABLE-Urgent .) (01.24.22 @ 09:39) >  IMPRESSION: No evidence for focal infiltrate or lobar consolidation.    LABS: All Labs Reviewed:                        8.2    7.83  )-----------( 212      ( 02 Feb 2022 08:03 )             25.0   135  |  99  |  30<H>  ----------------------------<  101<H>  3.7   |  26  |  6.11<H>    time spent on discharge - 55 mins

## 2022-02-01 NOTE — BEHAVIORAL HEALTH ASSESSMENT NOTE - NSBHREFERDETAILS_PSY_A_CORE_FT
patient does not have capacity wants to go home back to where there is no power / drugs  40 y/o F PMx significant for MRSA bacteremia, COPD, Hypertension, ESRD on hemodialysis (MWF) (kidney bx c/w ANCA vasculitis), cocaine and heroin abuse,  epilepsy, Raynaud's syndrome, Rheumatoid arthritis, SLE admitted via e [facility] s/p tonic clonic seizure..

## 2022-02-01 NOTE — DISCHARGE NOTE PROVIDER - SMOKING CESSATION ADVICE OR COUNSELLING OFFERED
Mother visiting baby in NICU. Baby will be rooming in with mother this evening as she prepares for baby's discharge. Mother states she has been pumping at home and gets between 4-5 ounces per pump session. She breast fed baby prior to [address] visit and mother states baby breast fed well for 15 minutes. She plans to continue with breastfeeding and pumping. Mother has a symphony pump and kit to use at her bedside for tonight while rooming in. Gave mother extra storage bottles to collect her breast milk in. She did not have any questions at this time. Instructed mother to call [address] for any breastfeeding concerns. Accepted

## 2022-02-01 NOTE — DISCHARGE NOTE PROVIDER - PROVIDER TOKENS
PROVIDER:[TOKEN:[[id number]],FOLLOWUP:[1-3 days]],PROVIDER:[TOKEN:[[id number]],FOLLOWUP:[1 week]],PROVIDER:[TOKEN:[[id number]],FOLLOWUP:[1 week]]

## 2022-02-01 NOTE — BEHAVIORAL HEALTH ASSESSMENT NOTE - NSBHCHARTREVIEWVS_PSY_A_CORE FT
Vital Signs Last 24 Hrs  T(C): 37.3 (01 Feb 2022 08:30), Max: 37.8 (31 Jan 2022 15:45)  T(F): 99.1 (01 Feb 2022 08:30), Max: 100.1 (31 Jan 2022 15:45)  HR: 114 (01 Feb 2022 08:30) (92 - 120)  BP: 125/91 (01 Feb 2022 08:30) (113/97 - 135/73)  BP(mean): --  RR: 18 (01 Feb 2022 08:30) (17 - 18)  SpO2: 98% (01 Feb 2022 08:30) (98% - 100%)

## 2022-02-01 NOTE — DISCHARGE NOTE NURSING/CASE MANAGEMENT/SOCIAL WORK - NSDCPEFALRISK_GEN_ALL_CORE
For information on Fall & Injury Prevention, visit: [email or web address] OR  [email or web address] OR  https://www.cdc.gov/steadi/patient.html

## 2022-02-01 NOTE — DISCHARGE NOTE NURSING/CASE MANAGEMENT/SOCIAL WORK - NSDCCRTYPESERV_GEN_ALL_CORE_FT
Resume your regular dialysis schedule on Mondays, Wednesdays, and Fridays at 8:00am via Medicaid transportation.

## 2022-02-01 NOTE — DISCHARGE NOTE NURSING/CASE MANAGEMENT/SOCIAL WORK - PATIENT PORTAL LINK FT
You can access the FollowMyHealth Patient Portal offered by [facility] by registering at the following website: [email or web address]. By joining [facility]’s FollowMyHealth portal, you will also be able to view your health information using other applications (apps) compatible with our system.

## 2022-02-01 NOTE — BEHAVIORAL HEALTH ASSESSMENT NOTE - RISK ASSESSMENT
Low acute risk; No  SI,  denies depression and anxiety   Increased lontg term risk:  chronic drug use and no tx, losing housing  Elrysbc7di Factors  no prior SA and plans to have her own place and live independently. Low Acute Suicide Risk

## 2022-02-01 NOTE — BEHAVIORAL HEALTH ASSESSMENT NOTE - DESCRIPTION (FIRST USE, LAST USE, QUANTITY, FREQUENCY, DURATION)
utox positive , prior top this admission denies use in 1 yr however last tox pos for Opiates in Nov of this year recent use last tox in Nov pos

## 2022-02-01 NOTE — PROGRESS NOTE ADULT - SUBJECTIVE AND OBJECTIVE BOX
40 y/o F with PMH significant for MRSA bacteremia, COPD, Hypertension, ESRD on hemodialysis (MWF) (kidney bx c/w ANCA vasculitis),   cocaine and heroin abuse, depression, GERD, epilepsy, Raynaud's syndrome, Rheumatoid arthritis,   SLE, seizure disorder, hospitalized 12/15/21 through 12/18/21 for seizure,   presented in the ER via EMS  for AMS.   The patient family found her on the floor unresponsive after they left her alone for an hour.   The patient respond only to painful stimulation. Patient was intubated for a Likely aspiration PNA.        HOSPITAL COURSE:  1. acute hypoxic resp failure   sepsis due to aspiration  PNA  h/o CDAD  - CXR : LLL infiltrate  - s/p 7d vanco / zosyn  - mepron for PCP ppx  - LP negative for infection     2. ESRD on HD  - HD per nephro, pt sees Dr Pena    3. seizure history  - continue keppra  - neuro following - rec d/c Dilantin and restart Keppra. Can give 500 mg daily and 250 mg additional after dialysis.    4. Rheum arth  - continue plaquenil    5. HTN  - pt metoprolol due to persistent tachycardia   - continue hydralazine, add lisinopril     6. Anemia   - Hb 6 ---> 8  - GI consult appreciated  - s/p EGD : erosive duodenitis  - continue protonix   - now on regular diet  - continue EPO     7. Cardiomyopathy (recent onset) with persistent LV dysfunction on f/u TTE yesterday (global).    Repeat ECG ordered (admission ECG with non-ischemic appearance); change   Poor candidate for ischemia evaluation at this time.   cont BB and add acei   see Dr Kirby in one week     8- Nicotine addiction  Polysubstance abuse   rec nicotine patch or gum     9 Pt has severe deconditioning  seen by PT and they rec home PT at this time      OTHER DETAILS:  2/1 - no cp palps sob; my first time seeing the patient but she is lucid, conversant and makes sense.     PHYSICAL EXAM:  Vital Signs Last 24 Hrs  T(F): 99.1 (01 Feb 2022 08:30), Max: 100.1 (31 Jan 2022 15:45)  HR: 114 (01 Feb 2022 08:30) (92 - 114)  BP: 125/91 (01 Feb 2022 08:30) (113/97 - 135/73)  RR: 18 (01 Feb 2022 08:30) (18 - 18)  SpO2: 98% (01 Feb 2022 08:30) (98% - 100%)  GENERAL: NAD, able to lie flat in bed  HEAD:  Atraumatic, Normocephalic  EYES: EOMI, PERRLA, normal sclera  ENT: Moist mucous membranes  NECK: Supple, No JVD, no nuchal rigidity  CHEST/LUNG: Clear to auscultation bilaterally; No rales, rhonchi, wheezing, or rubs. Unlabored respirations  HEART: Regular rate and rhythm; No murmurs, rubs, or gallops  ABDOMEN: Bowel sounds present; Soft, Nontender, Nondistended. No hepatomegaly  EXTREMITIES:  no pitting bilaterally  NERVOUS SYSTEM:  Alert & Oriented X3, speech clear. No focal motor or sensory deficits  MSK: FROM all 4 extremities, full and equal strength    LABS: All Labs Reviewed:                     7.7    9.50  )-----------( 261      ( 31 Jan 2022 07:54 )             23.5   133<L>  |  98  |  32<H>  ----------------------------<  155<H>  3.5   |  22  |  6.12<H>  Ca   8.4<L>      31 Jan 2022 07:54  Phos  6.1     01-31  TPro  x   /  Alb  2.0<L>  /  TBili  x   /  DBili  x   /  AST  x   /  ALT  x   /  AlkPhos  x   01-31    RADIOLOGY/EKG:  < from: Xray Chest 1 View- PORTABLE-Urgent (Xray Chest 1 View- PORTABLE-Urgent .) (01.24.22 @ 09:39) >  IMPRESSION: No evidence for focal infiltrate or lobar consolidation.    discussed with RN   time spent on discharge - 55 mins   discussed with Rusty Pena and Kofi                   Med Reconciliation:  Medication Reconciliation Status	Admission Reconciliation is Completed  Discharge Reconciliation is Completed  Discharge Medications	albuterol 90 mcg/inh inhalation aerosol: 2 puff(s) inhaled every 6 hours, As needed, Shortness of Breath and/or Wheezing  atovaquone 750 mg/5 mL oral suspension: 10 milliliter(s) orally once a day  gabapentin 100 mg oral capsule: 1 cap(s) orally 3 times a day MDD:Maximum daily dose 300  hydrALAZINE 25 mg oral tablet: 1 tab(s) orally 3 times a day  hydroxychloroquine 200 mg oral tablet: 1 tab(s) orally once a day MDD:200 daily  Keppra 250 mg oral tablet: 1 tab(s) orally Monday, Wednesday, and Friday - AFTER HEMODIALYSIS    levETIRAcetam 500 mg oral tablet: 1 tab(s) orally once a day  lisinopril 10 mg oral tablet: 1 tab(s) orally once a day   melatonin 3 mg oral tablet: 1 tab(s) orally once a day (at bedtime), As needed, Insomnia  metoprolol tartrate 50 mg oral tablet: 1 tab(s) orally 2 times a day  nicotine 14 mg/24 hr transdermal film, extended release: 1 patch transdermally once a day   ocular lubricant ophthalmic solution: 1 drop(s) to each affected eye 2 times a day as needed for dry eyes  pantoprazole 40 mg oral delayed release tablet: 1 tab(s) orally once a day (before a meal)  sevelamer carbonate 800 mg oral tablet: 1 tab(s) orally 3 times a day (with meals)  Silvadene 1% topical cream: Apply topically to affected area 2 times a day  Tylenol 325 mg oral tablet: 2 tab(s) orally every 4 hours, As Needed   40 y/o F with PMH significant for MRSA bacteremia, COPD, Hypertension, ESRD on hemodialysis (MWF) (kidney bx c/w ANCA vasculitis),   cocaine and heroin abuse, depression, GERD, epilepsy, Raynaud's syndrome, Rheumatoid arthritis,   SLE, seizure disorder, hospitalized 12/15/21 through 12/18/21 for seizure,   presented in the ER via EMS  for AMS.   The patient family found her on the floor unresponsive after they left her alone for an hour.   The patient respond only to painful stimulation. Patient was intubated for a Likely aspiration PNA.        HOSPITAL COURSE:  1. acute hypoxic resp failure   sepsis due to aspiration  PNA  h/o CDAD  - CXR : LLL infiltrate  - s/p 7d vanco / zosyn  - mepron for PCP ppx  - LP negative for infection     2. ESRD on HD  - HD per nephro, pt sees Dr Pena    3. seizure history  - continue keppra  - neuro following - rec d/c Dilantin and restart Keppra. Can give 500 mg daily and 250 mg additional after dialysis.    4. Rheum arth  - continue plaquenil    5. HTN  - pt metoprolol due to persistent tachycardia   - continue hydralazine, add lisinopril     6. Anemia   - Hb 6 ---> 8  - GI consult appreciated  - s/p EGD : erosive duodenitis  - continue protonix   - now on regular diet  - continue EPO     7. Cardiomyopathy (recent onset) with persistent LV dysfunction on f/u TTE yesterday (global).    Repeat ECG ordered (admission ECG with non-ischemic appearance); change   Poor candidate for ischemia evaluation at this time.   cont BB and add acei   see Dr Kirby in one week     8- Nicotine addiction  Polysubstance abuse   rec nicotine patch or gum     9 Pt has severe deconditioning  seen by PT and they rec home PT at this time      2/1 - no cp palps sob; my first time seeing the patient but she is lucid, conversant and makes sense.     PHYSICAL EXAM:  Vital Signs Last 24 Hrs  T(F): 99.1 (01 Feb 2022 08:30), Max: 100.1 (31 Jan 2022 15:45)  HR: 114 (01 Feb 2022 08:30) (92 - 114)  BP: 125/91 (01 Feb 2022 08:30) (113/97 - 135/73)  RR: 18 (01 Feb 2022 08:30) (18 - 18)  SpO2: 98% (01 Feb 2022 08:30) (98% - 100%)  GENERAL: NAD, able to lie flat in bed  HEAD:  Atraumatic, Normocephalic  EYES: EOMI, PERRLA, normal sclera  ENT: Moist mucous membranes  NECK: Supple, No JVD, no nuchal rigidity  CHEST/LUNG: Clear to auscultation bilaterally; No rales, rhonchi, wheezing, or rubs. Unlabored respirations  HEART: Regular rate and rhythm; No murmurs, rubs, or gallops  ABDOMEN: Bowel sounds present; Soft, Nontender, Nondistended. No hepatomegaly  EXTREMITIES:  no pitting bilaterally  NERVOUS SYSTEM:  Alert & Oriented X3, speech clear. No focal motor or sensory deficits  MSK: FROM all 4 extremities, full and equal strength    LABS: All Labs Reviewed:                     7.7    9.50  )-----------( 261      ( 31 Jan 2022 07:54 )             23.5   133<L>  |  98  |  32<H>  ----------------------------<  155<H>  3.5   |  22  |  6.12<H>  Ca   8.4<L>      31 Jan 2022 07:54  Phos  6.1     01-31  TPro  x   /  Alb  2.0<L>  /  TBili  x   /  DBili  x   /  AST  x   /  ALT  x   /  AlkPhos  x   01-31    RADIOLOGY/EKG:  < from: Xray Chest 1 View- PORTABLE-Urgent (Xray Chest 1 View- PORTABLE-Urgent .) (01.24.22 @ 09:39) >  IMPRESSION: No evidence for focal infiltrate or lobar consolidation.    discussed with RN   discussed with Rusty Pena and Kofi carlos planning underway                   Med Reconciliation:  Medication Reconciliation Status	Admission Reconciliation is Completed  Discharge Reconciliation is Completed  Discharge Medications	albuterol 90 mcg/inh inhalation aerosol: 2 puff(s) inhaled every 6 hours, As needed, Shortness of Breath and/or Wheezing  atovaquone 750 mg/5 mL oral suspension: 10 milliliter(s) orally once a day  gabapentin 100 mg oral capsule: 1 cap(s) orally 3 times a day MDD:Maximum daily dose 300  hydrALAZINE 25 mg oral tablet: 1 tab(s) orally 3 times a day  hydroxychloroquine 200 mg oral tablet: 1 tab(s) orally once a day MDD:200 daily  Keppra 250 mg oral tablet: 1 tab(s) orally Monday, Wednesday, and Friday - AFTER HEMODIALYSIS    levETIRAcetam 500 mg oral tablet: 1 tab(s) orally once a day  lisinopril 10 mg oral tablet: 1 tab(s) orally once a day   melatonin 3 mg oral tablet: 1 tab(s) orally once a day (at bedtime), As needed, Insomnia  metoprolol tartrate 50 mg oral tablet: 1 tab(s) orally 2 times a day  nicotine 14 mg/24 hr transdermal film, extended release: 1 patch transdermally once a day   ocular lubricant ophthalmic solution: 1 drop(s) to each affected eye 2 times a day as needed for dry eyes  pantoprazole 40 mg oral delayed release tablet: 1 tab(s) orally once a day (before a meal)  sevelamer carbonate 800 mg oral tablet: 1 tab(s) orally 3 times a day (with meals)  Silvadene 1% topical cream: Apply topically to affected area 2 times a day  Tylenol 325 mg oral tablet: 2 tab(s) orally every 4 hours, As Needed

## 2022-02-01 NOTE — DISCHARGE NOTE PROVIDER - CARE PROVIDERS DIRECT ADDRESSES
[email or web address]

## 2022-02-01 NOTE — DISCHARGE NOTE PROVIDER - CARE PROVIDER_API CALL
Blake Pena)  Internal Medicine; Nephrology  [address] [place] [address]  Phone: [phone]  Fax: [phone]  Follow Up Time: 1-3 days    Carmelina Sykes)  Clinical Neurophysiology; EEGEpilepsy; Neurology; Sleep Medicine  [address] [facility], [address] [place] [address]  Phone: [phone]  Fax: [phone]  Follow Up Time: 1 week    Abhinav Kirby)  Cardiology  [address] [place] [address]  Phone: [phone]  Fax: [phone]  Follow Up Time: 1 week

## 2022-02-01 NOTE — DISCHARGE NOTE PROVIDER - NSDCMRMEDTOKEN_GEN_ALL_CORE_FT
albuterol 90 mcg/inh inhalation aerosol: 2 puff(s) inhaled every 6 hours, As needed, Shortness of Breath and/or Wheezing  atovaquone 750 mg/5 mL oral suspension: 10 milliliter(s) orally once a day  gabapentin 100 mg oral capsule: 1 cap(s) orally 3 times a day MDD:Maximum daily dose 300  hydrALAZINE 25 mg oral tablet: 1 tab(s) orally 3 times a day  hydroxychloroquine 200 mg oral tablet: 1 tab(s) orally once a day MDD:200 daily  Keppra 250 mg oral tablet: 1 tab(s) orally Monday, Wednesday, and Friday - AFTER HEMODIALYSIS    levETIRAcetam 500 mg oral tablet: 1 tab(s) orally once a day  lisinopril 10 mg oral tablet: 1 tab(s) orally once a day   melatonin 3 mg oral tablet: 1 tab(s) orally once a day (at bedtime), As needed, Insomnia  metoprolol tartrate 50 mg oral tablet: 1 tab(s) orally 2 times a day  nicotine 14 mg/24 hr transdermal film, extended release: 1 patch transdermally once a day   ocular lubricant ophthalmic solution: 1 drop(s) to each affected eye 2 times a day as needed for dry eyes  pantoprazole 40 mg oral delayed release tablet: 1 tab(s) orally once a day (before a meal)  sevelamer carbonate 800 mg oral tablet: 1 tab(s) orally 3 times a day (with meals)  Silvadene 1% topical cream: Apply topically to affected area 2 times a day  Tylenol 325 mg oral tablet: 2 tab(s) orally every 4 hours, As Needed

## 2022-02-01 NOTE — BEHAVIORAL HEALTH ASSESSMENT NOTE - NSBHCHARTREVIEWLAB_PSY_A_CORE FT
7.7    9.50  )-----------( 261      ( 31 Jan 2022 07:54 )             23.5   01-31    133<L>  |  98  |  32<H>  ----------------------------<  155<H>  3.5   |  22  |  6.12<H>    Ca    8.4<L>      31 Jan 2022 07:54  Phos  6.1     01-31    TPro  x   /  Alb  2.0<L>  /  TBili  x   /  DBili  x   /  AST  x   /  ALT  x   /  AlkPhos  x   01-31

## 2022-02-01 NOTE — BEHAVIORAL HEALTH ASSESSMENT NOTE - SUMMARY
40 yo swf, lives in her grandmothers house, f, no formal PPH, tx, psych admissions, suicide attempt and with reported  reported   polysubstance dependence Opiates cocaine PMH MRSA bacteremia, COPD, Hypertension, ESRD on hemodialysis (MWF) (kidney bx c/w ANCA vasculitis),   GERD, epilepsy, Raynaud's syndrome, Rheumatoid arthritis, admitted to  for seizure referred to psych after room mate called saying Pt is suicidal.  Pt reports no prior psych h/o and has "never" been suicidal in her life.  Reports recently OD on IV heroin, but state she die not want to die. Pt states she has mild depression and anxiety since her grandmother's passing, bur denies being in treatment . Pt denies current heroin use/. She also OD last year which left her vented/trached and protracted medical admission.   Pt tox however was positive cocaine and Opiates , she does not remember if she used cocaine. Smokes marijuana occasionally.   Pt denies SI/HI/AH/VH and PI. .      Pt declined need for referral, denies SI/HI/AH/VH and no evidence of psychosis or alberto and is cleared for discharge as she does not meet criteria for involuntary psych admission.  She is oriented it time, place, situation and to person.    Pt knows her medical condition, she is aware of situation, she appreciate it, she is not psychotic  not manic, not depressed, not suicidal. P can reason and express her choices. She does have mental capacity to participate in her discharge process.

## 2022-02-01 NOTE — BEHAVIORAL HEALTH ASSESSMENT NOTE - HPI (INCLUDE ILLNESS QUALITY, SEVERITY, DURATION, TIMING, CONTEXT, MODIFYING FACTORS, ASSOCIATED SIGNS AND SYMPTOMS)
42 yo swf, lives in her grandmothers house, f, no formal PPH, tx, psych admissions, suicide attempt and with reported  reported   polysubstance dependence Opiates cocaine PMH MRSA bacteremia, COPD, Hypertension, ESRD on hemodialysis (MWF) (kidney bx c/w ANCA vasculitis),   GERD, epilepsy, Raynaud's syndrome, Rheumatoid arthritis, admitted to  for seizure referred to psych after room mate called saying Pt is suicidal.  Pt reports no prior psych h/o and has "never" been suicidal in her life.  Reports recently OD on IV heroin, but state she die not want to die. Pt states she has mild depression and anxiety since her grandmother's passing, bur denies being in treatment . Pt denies current heroin use/. She also OD last year which left her vented/trached and protracted medical admission.   Pt tox however was positive cocaine and Opiates , she does not remember if she used cocaine. Smokes marijuana occasionally.   Pt denies SI/HI/AH/VH and PI. .      Pt declined need for referral, denies SI/HI/AH/VH and no evidence of psychosis or alberto and is cleared for discharge as she does not meet criteria for involuntary psych admission.  She is oriented it time, place, situation and to person.

## 2022-02-02 VITALS
RESPIRATION RATE: 16 BRPM | HEART RATE: 112 BPM | SYSTOLIC BLOOD PRESSURE: 118 MMHG | DIASTOLIC BLOOD PRESSURE: 82 MMHG | OXYGEN SATURATION: 100 %

## 2022-02-02 LAB
ALBUMIN SERPL ELPH-MCNC: 2 G/DL — LOW (ref 3.3–5)
ANION GAP SERPL CALC-SCNC: 10 MMOL/L — SIGNIFICANT CHANGE UP (ref 5–17)
BUN SERPL-MCNC: 30 MG/DL — HIGH (ref 7–23)
CALCIUM SERPL-MCNC: 8.7 MG/DL — SIGNIFICANT CHANGE UP (ref 8.5–10.1)
CHLORIDE SERPL-SCNC: 99 MMOL/L — SIGNIFICANT CHANGE UP (ref 96–108)
CO2 SERPL-SCNC: 26 MMOL/L — SIGNIFICANT CHANGE UP (ref 22–31)
CREAT SERPL-MCNC: 6.11 MG/DL — HIGH (ref 0.5–1.3)
GLUCOSE SERPL-MCNC: 101 MG/DL — HIGH (ref 70–99)
HCT VFR BLD CALC: 25 % — LOW (ref 34.5–45)
HGB BLD-MCNC: 8.2 G/DL — LOW (ref 11.5–15.5)
MCHC RBC-ENTMCNC: 29.7 PG — SIGNIFICANT CHANGE UP (ref 27–34)
MCHC RBC-ENTMCNC: 32.8 GM/DL — SIGNIFICANT CHANGE UP (ref 32–36)
MCV RBC AUTO: 90.6 FL — SIGNIFICANT CHANGE UP (ref 80–100)
PHOSPHATE SERPL-MCNC: 5.7 MG/DL — HIGH (ref 2.5–4.5)
PLATELET # BLD AUTO: 212 K/UL — SIGNIFICANT CHANGE UP (ref 150–400)
POTASSIUM SERPL-MCNC: 3.7 MMOL/L — SIGNIFICANT CHANGE UP (ref 3.5–5.3)
POTASSIUM SERPL-SCNC: 3.7 MMOL/L — SIGNIFICANT CHANGE UP (ref 3.5–5.3)
RBC # BLD: 2.76 M/UL — LOW (ref 3.8–5.2)
RBC # FLD: 16.1 % — HIGH (ref 10.3–14.5)
SODIUM SERPL-SCNC: 135 MMOL/L — SIGNIFICANT CHANGE UP (ref 135–145)
WBC # BLD: 7.83 K/UL — SIGNIFICANT CHANGE UP (ref 3.8–10.5)
WBC # FLD AUTO: 7.83 K/UL — SIGNIFICANT CHANGE UP (ref 3.8–10.5)

## 2022-02-02 PROCEDURE — 99239 HOSP IP/OBS DSCHRG MGMT >30: CPT

## 2022-02-02 RX ORDER — METOPROLOL TARTRATE 50 MG
25 TABLET ORAL DAILY
Refills: 0 | Status: DISCONTINUED | OUTPATIENT
Start: 2022-02-02 | End: 2022-02-02

## 2022-02-02 RX ORDER — DIPHENHYDRAMINE HCL 50 MG
25 CAPSULE ORAL EVERY 4 HOURS
Refills: 0 | Status: DISCONTINUED | OUTPATIENT
Start: 2022-02-02 | End: 2022-02-02

## 2022-02-02 RX ORDER — METOPROLOL TARTRATE 50 MG
25 TABLET ORAL ONCE
Refills: 0 | Status: COMPLETED | OUTPATIENT
Start: 2022-02-02 | End: 2022-02-02

## 2022-02-02 RX ADMIN — Medication 25 MILLIGRAM(S): at 12:22

## 2022-02-02 RX ADMIN — SEVELAMER CARBONATE 800 MILLIGRAM(S): 2400 POWDER, FOR SUSPENSION ORAL at 17:05

## 2022-02-02 RX ADMIN — Medication 1 DROP(S): at 12:23

## 2022-02-02 RX ADMIN — Medication 1 APPLICATION(S): at 12:50

## 2022-02-02 RX ADMIN — SEVELAMER CARBONATE 800 MILLIGRAM(S): 2400 POWDER, FOR SUSPENSION ORAL at 06:50

## 2022-02-02 RX ADMIN — Medication 25 MILLIGRAM(S): at 12:42

## 2022-02-02 RX ADMIN — Medication 100 MILLIGRAM(S): at 18:37

## 2022-02-02 RX ADMIN — Medication 650 MILLIGRAM(S): at 18:38

## 2022-02-02 RX ADMIN — Medication 100 MILLIGRAM(S): at 12:46

## 2022-02-02 RX ADMIN — Medication 650 MILLIGRAM(S): at 12:47

## 2022-02-02 RX ADMIN — LEVETIRACETAM 500 MILLIGRAM(S): 250 TABLET, FILM COATED ORAL at 19:10

## 2022-02-02 RX ADMIN — Medication 25 MILLIGRAM(S): at 18:42

## 2022-02-02 RX ADMIN — ERYTHROPOIETIN 10000 UNIT(S): 10000 INJECTION, SOLUTION INTRAVENOUS; SUBCUTANEOUS at 10:44

## 2022-02-02 RX ADMIN — Medication 3 MILLIGRAM(S): at 18:42

## 2022-02-02 RX ADMIN — Medication 25 MILLIGRAM(S): at 10:56

## 2022-02-02 RX ADMIN — PANTOPRAZOLE SODIUM 40 MILLIGRAM(S): 20 TABLET, DELAYED RELEASE ORAL at 12:22

## 2022-02-02 RX ADMIN — ATOVAQUONE 1500 MILLIGRAM(S): 750 SUSPENSION ORAL at 12:23

## 2022-02-02 RX ADMIN — Medication 100 MILLIGRAM(S): at 07:03

## 2022-02-02 RX ADMIN — Medication 650 MILLIGRAM(S): at 07:04

## 2022-02-02 RX ADMIN — Medication 200 MILLIGRAM(S): at 12:24

## 2022-02-02 RX ADMIN — Medication 25 MILLIGRAM(S): at 01:33

## 2022-02-02 RX ADMIN — SEVELAMER CARBONATE 800 MILLIGRAM(S): 2400 POWDER, FOR SUSPENSION ORAL at 12:25

## 2022-02-02 NOTE — PROGRESS NOTE ADULT - NUTRITIONAL ASSESSMENT
This patient has been assessed with a concern for Malnutrition and has been determined to have a diagnosis/diagnoses of Severe protein-calorie malnutrition and Underweight (BMI < 19).    This patient is being managed with:   Diet Renal Restrictions-  For patients receiving Renal Replacement - No Protein Restr No Conc K No Conc Phos Low Sodium  Entered: Jan 25 2022  7:47AM    The following pending diet order is being considered for treatment of Severe protein-calorie malnutrition and Underweight (BMI < 19):  Diet Regular-  Supplement Feeding Modality:  Oral  Nepro Cans or Servings Per Day:  1       Frequency:  Two Times a day  Entered: Jan 26 2022  9:10AM  
This patient has been assessed with a concern for Malnutrition and has been determined to have a diagnosis/diagnoses of Severe protein-calorie malnutrition and Underweight (BMI < 19).    This patient is being managed with:   Diet Renal Restrictions-  For patients receiving Renal Replacement - No Protein Restr No Conc K No Conc Phos Low Sodium  Entered: Jan 29 2022 12:09PM    The following pending diet order is being considered for treatment of Severe protein-calorie malnutrition and Underweight (BMI < 19):  Diet Regular-  Supplement Feeding Modality:  Oral  Nepro Cans or Servings Per Day:  1       Frequency:  Two Times a day  Entered: Jan 26 2022  9:10AM  
This patient has been assessed with a concern for Malnutrition and has been determined to have a diagnosis/diagnoses of Severe protein-calorie malnutrition and Underweight (BMI < 19).    This patient is being managed with:   Diet NPO with Tube Feed-  Tube Feeding Modality: Orogastric  Nepro with Carb Steady (NEPRORTH)  Total Volume for 24 Hours (mL): 720  Continuous  Starting Tube Feed Rate {mL per Hour}: 20  Increase Tube Feed Rate by (mL): 10     Every 8 hours  Until Goal Tube Feed Rate (mL per Hour): 30  Tube Feed Duration (in Hours): 24  Tube Feed Start Time: 00:00  Free Water Flush Instructions:  As per MD  No Carb Prosource TF     Qty per Day:  4  Entered: Jan 20 2022  9:39AM    
This patient has been assessed with a concern for Malnutrition and has been determined to have a diagnosis/diagnoses of Severe protein-calorie malnutrition and Underweight (BMI < 19).    This patient is being managed with:   Diet NPO with Tube Feed-  Tube Feeding Modality: Orogastric  Nepro with Carb Steady (NEPRORTH)  Total Volume for 24 Hours (mL): 720  Continuous  Starting Tube Feed Rate {mL per Hour}: 20  Increase Tube Feed Rate by (mL): 10     Every 8 hours  Until Goal Tube Feed Rate (mL per Hour): 30  Tube Feed Duration (in Hours): 24  Tube Feed Start Time: 00:00  Free Water Flush Instructions:  As per MD  No Carb Prosource TF     Qty per Day:  4  Entered: Jan 20 2022  9:39AM    
This patient has been assessed with a concern for Malnutrition and has been determined to have a diagnosis/diagnoses of Severe protein-calorie malnutrition and Underweight (BMI < 19).    This patient is being managed with:   Diet NPO-  Entered: Jan 27 2022  9:30AM    The following pending diet order is being considered for treatment of Severe protein-calorie malnutrition and Underweight (BMI < 19):  Diet Regular-  Supplement Feeding Modality:  Oral  Nepro Cans or Servings Per Day:  1       Frequency:  Two Times a day  Entered: Jan 26 2022  9:10AM  
This patient has been assessed with a concern for Malnutrition and has been determined to have a diagnosis/diagnoses of Severe protein-calorie malnutrition and Underweight (BMI < 19).    This patient is being managed with:   Diet Renal Restrictions-  For patients receiving Renal Replacement - No Protein Restr No Conc K No Conc Phos Low Sodium  Entered: Jan 29 2022 12:09PM    The following pending diet order is being considered for treatment of Severe protein-calorie malnutrition and Underweight (BMI < 19):  Diet Regular-  Supplement Feeding Modality:  Oral  Nepro Cans or Servings Per Day:  1       Frequency:  Two Times a day  Entered: Jan 26 2022  9:10AM  
This patient has been assessed with a concern for Malnutrition and has been determined to have a diagnosis/diagnoses of Underweight (BMI < 19) and Severe protein-calorie malnutrition.    This patient is being managed with:   Diet Renal Restrictions-  For patients receiving Renal Replacement - No Protein Restr No Conc K No Conc Phos Low Sodium  Entered: Jan 29 2022 12:09PM    The following pending diet order is being considered for treatment of Underweight (BMI < 19) and Severe protein-calorie malnutrition:  Diet Regular-  Supplement Feeding Modality:  Oral  Nepro Cans or Servings Per Day:  1       Frequency:  Two Times a day  Entered: Jan 26 2022  9:10AM  
This patient has been assessed with a concern for Malnutrition and has been determined to have a diagnosis/diagnoses of Severe protein-calorie malnutrition and Underweight (BMI < 19).    This patient is being managed with:   Diet NPO with Tube Feed-  Tube Feeding Modality: Orogastric  Nepro with Carb Steady (NEPRORTH)  Total Volume for 24 Hours (mL): 720  Continuous  Starting Tube Feed Rate {mL per Hour}: 20  Increase Tube Feed Rate by (mL): 10     Every 8 hours  Until Goal Tube Feed Rate (mL per Hour): 30  Tube Feed Duration (in Hours): 24  Tube Feed Start Time: 00:00  Free Water Flush Instructions:  As per MD  No Carb Prosource TF     Qty per Day:  4  Entered: Jan 20 2022  9:39AM    
This patient has been assessed with a concern for Malnutrition and has been determined to have a diagnosis/diagnoses of Severe protein-calorie malnutrition and Underweight (BMI < 19).    This patient is being managed with:   Diet NPO with Tube Feed-  Tube Feeding Modality: Orogastric  Nepro with Carb Steady (NEPRORTH)  Total Volume for 24 Hours (mL): 720  Continuous  Starting Tube Feed Rate {mL per Hour}: 20  Increase Tube Feed Rate by (mL): 10     Every 8 hours  Until Goal Tube Feed Rate (mL per Hour): 30  Tube Feed Duration (in Hours): 24  Tube Feed Start Time: 00:00  Free Water Flush Instructions:  As per MD  No Carb Prosource TF     Qty per Day:  4  Entered: Jan 20 2022  9:39AM    
This patient has been assessed with a concern for Malnutrition and has been determined to have a diagnosis/diagnoses of Severe protein-calorie malnutrition and Underweight (BMI < 19).    This patient is being managed with:   Diet Regular-  Entered: Jan 26 2022  2:54PM    Diet Regular-  Supplement Feeding Modality:  Oral  Nepro Cans or Servings Per Day:  1       Frequency:  Two Times a day  Entered: Jan 26 2022  9:10AM    The following pending diet order is being considered for treatment of Severe protein-calorie malnutrition and Underweight (BMI < 19):null
This patient has been assessed with a concern for Malnutrition and has been determined to have a diagnosis/diagnoses of Severe protein-calorie malnutrition and Underweight (BMI < 19).    This patient is being managed with:   Diet Full Liquid-  Entered: Jan 28 2022  8:49AM    The following pending diet order is being considered for treatment of Severe protein-calorie malnutrition and Underweight (BMI < 19):  Diet Regular-  Supplement Feeding Modality:  Oral  Nepro Cans or Servings Per Day:  1       Frequency:  Two Times a day  Entered: Jan 26 2022  9:10AM  
This patient has been assessed with a concern for Malnutrition and has been determined to have a diagnosis/diagnoses of Severe protein-calorie malnutrition and Underweight (BMI < 19).    This patient is being managed with:   Diet Full Liquid-  Entered: Jan 28 2022  8:49AM    The following pending diet order is being considered for treatment of Severe protein-calorie malnutrition and Underweight (BMI < 19):  Diet Regular-  Supplement Feeding Modality:  Oral  Nepro Cans or Servings Per Day:  1       Frequency:  Two Times a day  Entered: Jan 26 2022  9:10AM  
This patient has been assessed with a concern for Malnutrition and has been determined to have a diagnosis/diagnoses of Severe protein-calorie malnutrition and Underweight (BMI < 19).    This patient is being managed with:   Diet NPO-  Entered: Jan 19 2022  5:43PM    The following pending diet order is being considered for treatment of Severe protein-calorie malnutrition and Underweight (BMI < 19):  Diet NPO with Tube Feed-  Tube Feeding Modality: Orogastric  Nepro with Carb Steady (NEPRORTH)  Total Volume for 24 Hours (mL): 720  Continuous  Starting Tube Feed Rate {mL per Hour}: 20  Increase Tube Feed Rate by (mL): 10     Every 8 hours  Until Goal Tube Feed Rate (mL per Hour): 30  Tube Feed Duration (in Hours): 24  Tube Feed Start Time: 00:00  Free Water Flush Instructions:  As per MD  No Carb Prosource TF     Qty per Day:  4  Entered: Jan 20 2022  9:39AM  
This patient has been assessed with a concern for Malnutrition and has been determined to have a diagnosis/diagnoses of Severe protein-calorie malnutrition and Underweight (BMI < 19).    This patient is being managed with:   Diet Regular-  Entered: Jan 26 2022  2:54PM    Diet Regular-  Supplement Feeding Modality:  Oral  Nepro Cans or Servings Per Day:  1       Frequency:  Two Times a day  Entered: Jan 26 2022  9:10AM    The following pending diet order is being considered for treatment of Severe protein-calorie malnutrition and Underweight (BMI < 19):null  This patient has been assessed with a concern for Malnutrition and has been determined to have a diagnosis/diagnoses of Severe protein-calorie malnutrition and Underweight (BMI < 19).    This patient is being managed with:   Diet Regular-  Entered: Jan 26 2022  2:54PM    Diet Regular-  Supplement Feeding Modality:  Oral  Nepro Cans or Servings Per Day:  1       Frequency:  Two Times a day  Entered: Jan 26 2022  9:10AM    The following pending diet order is being considered for treatment of Severe protein-calorie malnutrition and Underweight (BMI < 19):null
This patient has been assessed with a concern for Malnutrition and has been determined to have a diagnosis/diagnoses of Severe protein-calorie malnutrition and Underweight (BMI < 19).    This patient is being managed with:   Diet Renal Restrictions-  For patients receiving Renal Replacement - No Protein Restr No Conc K No Conc Phos Low Sodium  Entered: Jan 29 2022 12:09PM    The following pending diet order is being considered for treatment of Severe protein-calorie malnutrition and Underweight (BMI < 19):  Diet Regular-  Supplement Feeding Modality:  Oral  Nepro Cans or Servings Per Day:  1       Frequency:  Two Times a day  Entered: Jan 26 2022  9:10AM  
This patient has been assessed with a concern for Malnutrition and has been determined to have a diagnosis/diagnoses of Severe protein-calorie malnutrition and Underweight (BMI < 19).    This patient is being managed with:   Diet Renal Restrictions-  For patients receiving Renal Replacement - No Protein Restr No Conc K No Conc Phos Low Sodium  Entered: Jan 29 2022 12:09PM    The following pending diet order is being considered for treatment of Severe protein-calorie malnutrition and Underweight (BMI < 19):  Diet Regular-  Supplement Feeding Modality:  Oral  Nepro Cans or Servings Per Day:  1       Frequency:  Two Times a day  Entered: Jan 26 2022  9:10AM  
This patient has been assessed with a concern for Malnutrition and has been determined to have a diagnosis/diagnoses of Severe protein-calorie malnutrition and Underweight (BMI < 19).    This patient is being managed with:   Diet NPO with Tube Feed-  Tube Feeding Modality: Orogastric  Nepro with Carb Steady (NEPRORTH)  Total Volume for 24 Hours (mL): 720  Continuous  Starting Tube Feed Rate {mL per Hour}: 20  Increase Tube Feed Rate by (mL): 10     Every 8 hours  Until Goal Tube Feed Rate (mL per Hour): 30  Tube Feed Duration (in Hours): 24  Tube Feed Start Time: 00:00  Free Water Flush Instructions:  As per MD  No Carb Prosource TF     Qty per Day:  4  Entered: Jan 20 2022  9:39AM    
This patient has been assessed with a concern for Malnutrition and has been determined to have a diagnosis/diagnoses of Severe protein-calorie malnutrition and Underweight (BMI < 19).    This patient is being managed with:   Diet NPO with Tube Feed-  Tube Feeding Modality: Orogastric  Nepro with Carb Steady (NEPRORTH)  Total Volume for 24 Hours (mL): 480  Continuous  Starting Tube Feed Rate {mL per Hour}: 20  Until Goal Tube Feed Rate (mL per Hour): 20  Tube Feed Duration (in Hours): 24  Tube Feed Start Time: 13:00  Entered: Jan 23 2022 11:46AM    
This patient has been assessed with a concern for Malnutrition and has been determined to have a diagnosis/diagnoses of Severe protein-calorie malnutrition and Underweight (BMI < 19).    This patient is being managed with:   Diet Renal Restrictions-  For patients receiving Renal Replacement - No Protein Restr No Conc K No Conc Phos Low Sodium  Entered: Jan 25 2022  7:47AM

## 2022-02-02 NOTE — PROGRESS NOTE ADULT - ASSESSMENT
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  on HD MWF. Recent admission for MRSA bacteremia - permcath was replaced . 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

## 2022-02-02 NOTE — PROGRESS NOTE ADULT - PROVIDER SPECIALTY LIST ADULT
Cardiology
Cardiology
Critical Care
Hospitalist
Nephrology
Neurology
Cardiology
Critical Care
Critical Care
Nephrology
Cardiology
Hospitalist
Infectious Disease
MICU
Nephrology
Neurology
Cardiology
Cardiology
Critical Care
Hospitalist
Hospitalist
Infectious Disease
Infectious Disease
Nephrology
Nephrology
Critical Care
Gastroenterology
Hospitalist
Hospitalist
Critical Care

## 2022-02-02 NOTE — PROGRESS NOTE ADULT - ASSESSMENT
1. acute hypoxic resp failure   sepsis due to aspiration  PNA  h/o CDAD  - CXR : LLL infiltrate  - s/p 7d vanco / zosyn  - mepron for PCP ppx  - LP negative for infection     2. ESRD on HD  - HD per nephro, pt sees Dr Pena    3. seizure history  - continue keppra  - neuro following - rec d/c Dilantin and restart Keppra. Can give 500 mg daily and 250 mg additional after dialysis.    4. Rheum arth  - continue plaquenil    5. HTN  - pt metoprolol due to persistent tachycardia   - continue hydralazine, add lisinopril     6. Anemia   - Hb 6 ---> 8  - GI consult appreciated  - s/p EGD : erosive duodenitis  - continue protonix   - now on regular diet  - continue EPO     7. Cardiomyopathy (recent onset) with persistent LV dysfunction on f/u TTE yesterday (global).    Repeat ECG ordered (admission ECG with non-ischemic appearance); change   Poor candidate for ischemia evaluation at this time.   cont BB and add acei   see Dr Kirby in one week     8- Nicotine addiction  Polysubstance abuse   rec nicotine patch or gum     9 Pt has severe deconditioning  seen by PT and they rec home PT at this time     1. acute hypoxic resp failure   sepsis due to aspiration  PNA  h/o CDAD  - CXR : LLL infiltrate  - s/p 7d vanco / zosyn  - mepron for PCP ppx  - LP negative for infection     2. ESRD on HD  - HD per nephro, pt sees Dr Pena    3. seizure history  - continue keppra  - neuro following - rec d/c Dilantin and restart Keppra. Can give 500 mg daily and 250 mg additional after dialysis.    4. Rheum arth  - continue plaquenil    5. HTN  - pt metoprolol due to persistent tachycardia   - continue hydralazine, add lisinopril     6. Anemia   - Hb 6 ---> 8  - GI consult appreciated  - s/p EGD : erosive duodenitis  - continue protonix   - now on regular diet  - continue EPO     7. Cardiomyopathy (recent onset) with persistent LV dysfunction on f/u TTE yesterday (global).    Repeat ECG ordered (admission ECG with non-ischemic appearance); change   Poor candidate for ischemia evaluation at this time.   cont BB and add acei   see Dr Kirby in one week     8- Nicotine addiction  Polysubstance abuse   rec nicotine patch or gum     9 Pt has severe deconditioning  seen by PT and they rec home PT at this time -reconsider and see if patient can go to rehab     discussed with CM and Nephrology

## 2022-02-02 NOTE — PROGRESS NOTE ADULT - REASON FOR ADMISSION
AMS, sepsis, ESKD
consult for AMS

## 2022-02-02 NOTE — PROGRESS NOTE ADULT - SUBJECTIVE AND OBJECTIVE BOX
40 y/o F with PMH significant for MRSA bacteremia, COPD, Hypertension, ESRD on hemodialysis (MWF) (kidney bx c/w ANCA vasculitis),   cocaine and heroin abuse, depression, GERD, epilepsy, Raynaud's syndrome, Rheumatoid arthritis,   SLE, seizure disorder, hospitalized 12/15/21 through 12/18/21 for seizure,   presented in the ER via EMS  for AMS.   The patient family found her on the floor unresponsive after they left her alone for an hour.   The patient respond only to painful stimulation. Patient was intubated for a Likely aspiration PNA.      2/1 - no cp palps sob; my first time seeing the patient but she is lucid, conversant and makes sense.   2/2 -     Vital Signs Last 24 Hrs  T(C): 36.6 (02 Feb 2022 07:45), Max: 37.4 (01 Feb 2022 16:26)  T(F): 97.8 (02 Feb 2022 07:45), Max: 99.3 (01 Feb 2022 16:26)  HR: 104 (02 Feb 2022 08:41) (91 - 108)  BP: 120/90 (02 Feb 2022 08:41) (119/84 - 138/99)  RR: 17 (02 Feb 2022 08:41) (16 - 19)  SpO2: 100% (01 Feb 2022 23:42) (100% - 100%)  GENERAL: NAD, able to lie flat in bed  HEAD:  Atraumatic, Normocephalic  EYES: EOMI, PERRLA, normal sclera  ENT: Moist mucous membranes  NECK: Supple, No JVD, no nuchal rigidity  CHEST/LUNG: Clear to auscultation bilaterally; No rales, rhonchi, wheezing, or rubs. Unlabored respirations  HEART: Regular rate and rhythm; No murmurs, rubs, or gallops  ABDOMEN: Bowel sounds present; Soft, Nontender, Nondistended. No hepatomegaly  EXTREMITIES:  no pitting bilaterally  NERVOUS SYSTEM:  Alert & Oriented X3, speech clear. No focal motor or sensory deficits  MSK: FROM all 4 extremities, full and equal strength      RADIOLOGY/EKG:  < from: Xray Chest 1 View- PORTABLE-Urgent (Xray Chest 1 View- PORTABLE-Urgent .) (01.24.22 @ 09:39) >  IMPRESSION: No evidence for focal infiltrate or lobar consolidation.      	   40 y/o F with PMH significant for MRSA bacteremia, COPD, Hypertension, ESRD on hemodialysis (MWF) (kidney bx c/w ANCA vasculitis),   cocaine and heroin abuse, depression, GERD, epilepsy, Raynaud's syndrome, Rheumatoid arthritis,   SLE, seizure disorder, hospitalized 12/15/21 through 12/18/21 for seizure,   presented in the ER via EMS  for AMS.   The patient family found her on the floor unresponsive after they left her alone for an hour.   The patient respond only to painful stimulation. Patient was intubated for a Likely aspiration PNA.      2/1 - no cp palps sob; my first time seeing the patient but she is lucid, conversant and makes sense.   2/2 - no cp palps sob, undergoing HD; as far as weakness she states she has some knee pain which is chronic but she otherwise feels that rehab wouldnt help her, she prefers to go home; tachy at HD and got BB     Vital Signs Last 24 Hrs  T(C): 36.6 (02 Feb 2022 07:45), Max: 37.4 (01 Feb 2022 16:26)  T(F): 97.8 (02 Feb 2022 07:45), Max: 99.3 (01 Feb 2022 16:26)  HR: 104 (02 Feb 2022 08:41) (91 - 108)  BP: 120/90 (02 Feb 2022 08:41) (119/84 - 138/99)  RR: 17 (02 Feb 2022 08:41) (16 - 19)  SpO2: 100% (01 Feb 2022 23:42) (100% - 100%)  GENERAL: NAD, able to lie flat in bed, undergoing HD   HEAD:  Atraumatic, Normocephalic  EYES: EOMI, PERRLA, normal sclera  ENT: Moist mucous membranes  NECK: Supple, No JVD, no nuchal rigidity  CHEST/LUNG: Clear to auscultation bilaterally; No rales, rhonchi, wheezing, or rubs. Unlabored respirations  HEART: Regular rate and rhythm; No murmurs, rubs, or gallops  ABDOMEN: Bowel sounds present; Soft, Nontender, Nondistended. No hepatomegaly  EXTREMITIES:  no pitting bilaterally  NERVOUS SYSTEM:  Alert & Oriented X3, speech clear. No focal motor or sensory deficits  MSK: FROM all 4 extremities, full and equal strength    RADIOLOGY/EKG:  < from: Xray Chest 1 View- PORTABLE-Urgent (Xray Chest 1 View- PORTABLE-Urgent .) (01.24.22 @ 09:39) >  IMPRESSION: No evidence for focal infiltrate or lobar consolidation.      LABS: All Labs Reviewed:                      8.2    7.83  )-----------( 212      ( 02 Feb 2022 08:03 )             25.0   135  |  99  |  30<H>  ----------------------------<  101<H>  3.7   |  26  |  6.11<H>  Ca    8.7      02 Feb 2022 08:03  Phos  5.7     02-02  TPro  x   /  Alb  2.0<L>  /  TBili  x   /  DBili  x   /  AST  x   /  ALT  x   /  AlkPhos  x   02-02    MEDS:   acetaminophen     Tablet .. 650 milliGRAM(s) Oral every 6 hours PRN  acetaminophen  Suppository .. 650 milliGRAM(s) Rectal every 6 hours PRN  albumin human 25% IVPB 50 milliLiter(s) IV Intermittent every 1 hour PRN  ALBUTerol    90 MICROgram(s) HFA Inhaler 2 Puff(s) Inhalation every 6 hours PRN  aluminum hydroxide/magnesium hydroxide/simethicone Suspension 30 milliLiter(s) Oral every 4 hours PRN  artificial  tears Solution 1 Drop(s) Both EYES two times a day  atovaquone  Suspension 1500 milliGRAM(s) Oral daily  collagenase Ointment 1 Application(s) Topical daily  diphenhydrAMINE 25 milliGRAM(s) Oral every 4 hours PRN  epoetin amee-epbx (RETACRIT) Injectable 73453 Unit(s) IV Push <User Schedule>  guaiFENesin Oral Liquid (Sugar-Free) 100 milliGRAM(s) Oral every 6 hours PRN  hydrALAZINE Injectable 10 milliGRAM(s) IV Push every 6 hours PRN  hydroxychloroquine 200 milliGRAM(s) Oral daily  levETIRAcetam 500 milliGRAM(s) Oral daily  melatonin 3 milliGRAM(s) Oral at bedtime PRN  metoprolol tartrate 50 milliGRAM(s) Oral two times a day  ondansetron Injectable 4 milliGRAM(s) IV Push every 8 hours PRN  pantoprazole    Tablet 40 milliGRAM(s) Oral every 12 hours  sevelamer carbonate 800 milliGRAM(s) Oral three times a day with meals  simethicone 80 milliGRAM(s) Chew three times a day PRN

## 2022-02-02 NOTE — PROVIDER CONTACT NOTE (OTHER) - SITUATION
AMS, seizure disorder    left message with ans service for dr to see pt in the morning
Faxed DC papers to PCP
Dr Mcdonald stated that Dr Hammer seen patient.  Called Dr Hammer and he spoke to Dr Russ, He is aware and will see patient
elevated troponin, BNP    left message with ans service for dr to see pt in the morning
ESKD, needs dialysis    left message with ans service for dr to see pt in the morning
notified office; spoke to Ayesha

## 2022-02-02 NOTE — PROGRESS NOTE ADULT - SUBJECTIVE AND OBJECTIVE BOX
Patient is a 41y Female who  tolerating HD    - 120    pt has been tachy  - denies palp , cp or sob    cardio placed on metoprolol 50 mg BID but diamond 110 on HD - will give 25 mg Metoprolol at HD today    was unable to go home due to snow - pt insisting on going home            duodenitis + on EGD        MEDICATIONS  (STANDING):  artificial  tears Solution 1 Drop(s) Both EYES two times a day  atovaquone  Suspension 1500 milliGRAM(s) Oral daily  collagenase Ointment 1 Application(s) Topical daily  epoetin amee-epbx (RETACRIT) Injectable 44010 Unit(s) IV Push <User Schedule>  hydroxychloroquine 200 milliGRAM(s) Oral daily  levETIRAcetam 500 milliGRAM(s) Oral daily  metoprolol tartrate 50 milliGRAM(s) Oral two times a day  pantoprazole    Tablet 40 milliGRAM(s) Oral every 12 hours  sevelamer carbonate 800 milliGRAM(s) Oral three times a day with meals    Vital Signs Last 24 Hrs  T(C): 37.6 (02 Feb 2022 17:42), Max: 37.6 (02 Feb 2022 16:13)  T(F): 99.6 (02 Feb 2022 17:42), Max: 99.6 (02 Feb 2022 16:13)  HR: 112 (02 Feb 2022 20:23) (91 - 124)  BP: 118/82 (02 Feb 2022 20:23) (110/82 - 138/99)  BP(mean): 111 (02 Feb 2022 12:30) (111 - 111)  RR: 16 (02 Feb 2022 20:23) (16 - 19)  SpO2: 100% (02 Feb 2022 20:23) (97% - 100%)    I&O's Detail        PHYSICAL EXAM:    Constitutional: frail, >>stated age  HEENT:dry, pale MM   Neck: No LAD, No JVD  Respiratory: scatt ronchi  Cardiovascular: S1 and S2   Extremities: chronic change/peripheral edema  Neurological: Arousable    hd cath +         LABS                                      8.2    7.83  )-----------( 212      ( 02 Feb 2022 08:03 )             25.0         135    |  99     |  30     ----------------------------<  101       02 Feb 2022 08:03  3.7     |  26     |  6.11     133    |  98     |  32     ----------------------------<  155       31 Jan 2022 07:54  3.5     |  22     |  6.12     132    |  99     |  26     ----------------------------<  91        30 Jan 2022 08:19  3.8     |  24     |  4.75     Ca    8.7        02 Feb 2022 08:03  Ca    8.4        31 Jan 2022 07:54    Phos  5.7       02 Feb 2022 08:03  Phos  6.1       31 Jan 2022 07:54      TPro  x      /  Alb  2.0    /  TBili  x      /        02 Feb 2022 08:03  DBili  x      /  AST  x      /  ALT  x      /  AlkPhos  x        TPro  x      /  Alb  2.0    /  TBili  x      /        31 Jan 2022 07:54  DBili  x      /  AST  x      /  ALT  x      /  AlkPhos  x                    RADIOLOGY & ADDITIONAL STUDIES:

## 2022-02-02 NOTE — PROVIDER CONTACT NOTE (OTHER) - DATE AND TIME:
19-Jan-2022 00:23
02-Feb-2022 20:47
18-Jan-2022 23:26
19-Jan-2022 00:27
25-Jan-2022 15:39
27-Jan-2022 09:51

## 2022-02-02 NOTE — PROGRESS NOTE ADULT - SUBJECTIVE AND OBJECTIVE BOX
42 y/o F with PMH significant for MRSA bacteremia, COPD, Hypertension, ESRD on hemodialysis (MWF) (kidney bx c/w ANCA vasculitis),   cocaine and heroin abuse, depression, GERD, epilepsy, Raynaud's syndrome, Rheumatoid arthritis,   SLE, seizure disorder, hospitalized 12/15/21 through 12/18/21 for seizure,   presented in the ER via EMS  for AMS.   The patient family found her on the floor unresponsive after they left her alone for an hour.   The patient respond only to painful stimulation. Patient was intubated for a Likely aspiration PNA.        HOSPITAL COURSE:  1. acute hypoxic resp failure   sepsis due to aspiration  PNA  h/o CDAD  - CXR : LLL infiltrate  - s/p 7d vanco / zosyn  - mepron for PCP ppx  - LP negative for infection     2. ESRD on HD  - HD per nephro, pt sees Dr Pena    3. seizure history  - continue keppra  - neuro following - rec d/c Dilantin and restart Keppra. Can give 500 mg daily and 250 mg additional after dialysis.    4. Rheum arth  - continue plaquenil    5. HTN  - pt metoprolol due to persistent tachycardia   - continue hydralazine, add lisinopril     6. Anemia   - Hb 6 ---> 8  - GI consult appreciated  - s/p EGD : erosive duodenitis  - continue protonix   - now on regular diet  - continue EPO     7. Cardiomyopathy (recent onset) with persistent LV dysfunction on f/u TTE yesterday (global).    Repeat ECG ordered (admission ECG with non-ischemic appearance); change   Poor candidate for ischemia evaluation at this time.   cont BB and add acei   see Dr Kirby in one week     8- Nicotine addiction  Polysubstance abuse   rec nicotine patch or gum     9 Pt has severe deconditioning  seen by PT and they rec home PT at this time      OTHER DETAILS:  2/1 - no cp palps sob; my first time seeing the patient but she is lucid, conversant and makes sense.     PHYSICAL EXAM:  Vital Signs Last 24 Hrs  T(F): 99.1 (01 Feb 2022 08:30), Max: 100.1 (31 Jan 2022 15:45)  HR: 114 (01 Feb 2022 08:30) (92 - 114)  BP: 125/91 (01 Feb 2022 08:30) (113/97 - 135/73)  RR: 18 (01 Feb 2022 08:30) (18 - 18)  SpO2: 98% (01 Feb 2022 08:30) (98% - 100%)  GENERAL: NAD, able to lie flat in bed  HEAD:  Atraumatic, Normocephalic  EYES: EOMI, PERRLA, normal sclera  ENT: Moist mucous membranes  NECK: Supple, No JVD, no nuchal rigidity  CHEST/LUNG: Clear to auscultation bilaterally; No rales, rhonchi, wheezing, or rubs. Unlabored respirations  HEART: Regular rate and rhythm; No murmurs, rubs, or gallops  ABDOMEN: Bowel sounds present; Soft, Nontender, Nondistended. No hepatomegaly  EXTREMITIES:  no pitting bilaterally  NERVOUS SYSTEM:  Alert & Oriented X3, speech clear. No focal motor or sensory deficits  MSK: FROM all 4 extremities, full and equal strength    LABS: All Labs Reviewed:                     7.7    9.50  )-----------( 261      ( 31 Jan 2022 07:54 )             23.5   133<L>  |  98  |  32<H>  ----------------------------<  155<H>  3.5   |  22  |  6.12<H>  Ca   8.4<L>      31 Jan 2022 07:54  Phos  6.1     01-31  TPro  x   /  Alb  2.0<L>  /  TBili  x   /  DBili  x   /  AST  x   /  ALT  x   /  AlkPhos  x   01-31    RADIOLOGY/EKG:  < from: Xray Chest 1 View- PORTABLE-Urgent (Xray Chest 1 View- PORTABLE-Urgent .) (01.24.22 @ 09:39) >  IMPRESSION: No evidence for focal infiltrate or lobar consolidation.    discussed with RN   time spent on discharge - 55 mins   discussed with Rusty Pena and Kofi                   Med Reconciliation:  Medication Reconciliation Status	Admission Reconciliation is Completed  Discharge Reconciliation is Completed  Discharge Medications	albuterol 90 mcg/inh inhalation aerosol: 2 puff(s) inhaled every 6 hours, As needed, Shortness of Breath and/or Wheezing  atovaquone 750 mg/5 mL oral suspension: 10 milliliter(s) orally once a day  gabapentin 100 mg oral capsule: 1 cap(s) orally 3 times a day MDD:Maximum daily dose 300  hydrALAZINE 25 mg oral tablet: 1 tab(s) orally 3 times a day  hydroxychloroquine 200 mg oral tablet: 1 tab(s) orally once a day MDD:200 daily  Keppra 250 mg oral tablet: 1 tab(s) orally Monday, Wednesday, and Friday - AFTER HEMODIALYSIS    levETIRAcetam 500 mg oral tablet: 1 tab(s) orally once a day  lisinopril 10 mg oral tablet: 1 tab(s) orally once a day   melatonin 3 mg oral tablet: 1 tab(s) orally once a day (at bedtime), As needed, Insomnia  metoprolol tartrate 50 mg oral tablet: 1 tab(s) orally 2 times a day  nicotine 14 mg/24 hr transdermal film, extended release: 1 patch transdermally once a day   ocular lubricant ophthalmic solution: 1 drop(s) to each affected eye 2 times a day as needed for dry eyes  pantoprazole 40 mg oral delayed release tablet: 1 tab(s) orally once a day (before a meal)  sevelamer carbonate 800 mg oral tablet: 1 tab(s) orally 3 times a day (with meals)  Silvadene 1% topical cream: Apply topically to affected area 2 times a day  Tylenol 325 mg oral tablet: 2 tab(s) orally every 4 hours, As Needed

## 2022-02-04 ENCOUNTER — INPATIENT (INPATIENT)
Facility: HOSPITAL | Age: 42
LOS: 13 days | Discharge: ANOTHER HOSPITAL | DRG: 720 | End: 2022-02-18
Attending: HOSPITALIST | Admitting: FAMILY MEDICINE
Payer: MEDICAID

## 2022-02-04 VITALS
HEART RATE: 136 BPM | WEIGHT: 104.94 LBS | HEIGHT: 65 IN | TEMPERATURE: 99 F | OXYGEN SATURATION: 97 % | SYSTOLIC BLOOD PRESSURE: 68 MMHG | DIASTOLIC BLOOD PRESSURE: 50 MMHG | RESPIRATION RATE: 18 BRPM

## 2022-02-04 DIAGNOSIS — E87.6 HYPOKALEMIA: ICD-10-CM

## 2022-02-04 DIAGNOSIS — G92.9 UNSPECIFIED TOXIC ENCEPHALOPATHY: ICD-10-CM

## 2022-02-04 DIAGNOSIS — K80.20 CALCULUS OF GALLBLADDER WITHOUT CHOLECYSTITIS WITHOUT OBSTRUCTION: Chronic | ICD-10-CM

## 2022-02-04 DIAGNOSIS — Z99.2 DEPENDENCE ON RENAL DIALYSIS: ICD-10-CM

## 2022-02-04 DIAGNOSIS — M06.9 RHEUMATOID ARTHRITIS, UNSPECIFIED: ICD-10-CM

## 2022-02-04 DIAGNOSIS — I77.6 ARTERITIS, UNSPECIFIED: ICD-10-CM

## 2022-02-04 DIAGNOSIS — Z88.5 ALLERGY STATUS TO NARCOTIC AGENT: ICD-10-CM

## 2022-02-04 DIAGNOSIS — I12.0 HYPERTENSIVE CHRONIC KIDNEY DISEASE WITH STAGE 5 CHRONIC KIDNEY DISEASE OR END STAGE RENAL DISEASE: ICD-10-CM

## 2022-02-04 DIAGNOSIS — K29.81 DUODENITIS WITH BLEEDING: ICD-10-CM

## 2022-02-04 DIAGNOSIS — R56.9 UNSPECIFIED CONVULSIONS: ICD-10-CM

## 2022-02-04 DIAGNOSIS — Z79.52 LONG TERM (CURRENT) USE OF SYSTEMIC STEROIDS: ICD-10-CM

## 2022-02-04 DIAGNOSIS — M32.9 SYSTEMIC LUPUS ERYTHEMATOSUS, UNSPECIFIED: ICD-10-CM

## 2022-02-04 DIAGNOSIS — I21.A1 MYOCARDIAL INFARCTION TYPE 2: ICD-10-CM

## 2022-02-04 DIAGNOSIS — Z98.51 TUBAL LIGATION STATUS: Chronic | ICD-10-CM

## 2022-02-04 DIAGNOSIS — K92.1 MELENA: ICD-10-CM

## 2022-02-04 DIAGNOSIS — Z87.19 PERSONAL HISTORY OF OTHER DISEASES OF THE DIGESTIVE SYSTEM: Chronic | ICD-10-CM

## 2022-02-04 DIAGNOSIS — D62 ACUTE POSTHEMORRHAGIC ANEMIA: ICD-10-CM

## 2022-02-04 DIAGNOSIS — R00.0 TACHYCARDIA, UNSPECIFIED: ICD-10-CM

## 2022-02-04 DIAGNOSIS — F12.10 CANNABIS ABUSE, UNCOMPLICATED: ICD-10-CM

## 2022-02-04 DIAGNOSIS — E83.39 OTHER DISORDERS OF PHOSPHORUS METABOLISM: ICD-10-CM

## 2022-02-04 DIAGNOSIS — Z79.51 LONG TERM (CURRENT) USE OF INHALED STEROIDS: ICD-10-CM

## 2022-02-04 DIAGNOSIS — J21.9 ACUTE BRONCHIOLITIS, UNSPECIFIED: ICD-10-CM

## 2022-02-04 DIAGNOSIS — Z78.1 PHYSICAL RESTRAINT STATUS: ICD-10-CM

## 2022-02-04 DIAGNOSIS — F19.94 OTHER PSYCHOACTIVE SUBSTANCE USE, UNSPECIFIED WITH PSYCHOACTIVE SUBSTANCE-INDUCED MOOD DISORDER: ICD-10-CM

## 2022-02-04 DIAGNOSIS — F14.10 COCAINE ABUSE, UNCOMPLICATED: ICD-10-CM

## 2022-02-04 DIAGNOSIS — A41.9 SEPSIS, UNSPECIFIED ORGANISM: ICD-10-CM

## 2022-02-04 DIAGNOSIS — Z86.19 PERSONAL HISTORY OF OTHER INFECTIOUS AND PARASITIC DISEASES: ICD-10-CM

## 2022-02-04 DIAGNOSIS — J96.01 ACUTE RESPIRATORY FAILURE WITH HYPOXIA: ICD-10-CM

## 2022-02-04 DIAGNOSIS — G40.909 EPILEPSY, UNSPECIFIED, NOT INTRACTABLE, WITHOUT STATUS EPILEPTICUS: ICD-10-CM

## 2022-02-04 DIAGNOSIS — E87.1 HYPO-OSMOLALITY AND HYPONATREMIA: ICD-10-CM

## 2022-02-04 DIAGNOSIS — E43 UNSPECIFIED SEVERE PROTEIN-CALORIE MALNUTRITION: ICD-10-CM

## 2022-02-04 DIAGNOSIS — I42.9 CARDIOMYOPATHY, UNSPECIFIED: ICD-10-CM

## 2022-02-04 DIAGNOSIS — J69.0 PNEUMONITIS DUE TO INHALATION OF FOOD AND VOMIT: ICD-10-CM

## 2022-02-04 DIAGNOSIS — F17.210 NICOTINE DEPENDENCE, CIGARETTES, UNCOMPLICATED: ICD-10-CM

## 2022-02-04 DIAGNOSIS — E87.3 ALKALOSIS: ICD-10-CM

## 2022-02-04 DIAGNOSIS — N18.6 END STAGE RENAL DISEASE: ICD-10-CM

## 2022-02-04 LAB
ADD ON TEST-SPECIMEN IN LAB: SIGNIFICANT CHANGE UP
ALBUMIN SERPL ELPH-MCNC: 2.4 G/DL — LOW (ref 3.3–5)
ALP SERPL-CCNC: 161 U/L — HIGH (ref 40–120)
ALT FLD-CCNC: 22 U/L — SIGNIFICANT CHANGE UP (ref 12–78)
ANION GAP SERPL CALC-SCNC: 19 MMOL/L — HIGH (ref 5–17)
APTT BLD: 35.9 SEC — HIGH (ref 27.5–35.5)
AST SERPL-CCNC: 79 U/L — HIGH (ref 15–37)
BASOPHILS # BLD AUTO: 0 K/UL — SIGNIFICANT CHANGE UP (ref 0–0.2)
BASOPHILS NFR BLD AUTO: 0 % — SIGNIFICANT CHANGE UP (ref 0–2)
BILIRUB SERPL-MCNC: 0.4 MG/DL — SIGNIFICANT CHANGE UP (ref 0.2–1.2)
BUN SERPL-MCNC: 23 MG/DL — SIGNIFICANT CHANGE UP (ref 7–23)
CALCIUM SERPL-MCNC: 8.1 MG/DL — LOW (ref 8.5–10.1)
CHLORIDE SERPL-SCNC: 94 MMOL/L — LOW (ref 96–108)
CO2 SERPL-SCNC: 19 MMOL/L — LOW (ref 22–31)
CREAT SERPL-MCNC: 5.84 MG/DL — HIGH (ref 0.5–1.3)
EOSINOPHIL # BLD AUTO: 0 K/UL — SIGNIFICANT CHANGE UP (ref 0–0.5)
EOSINOPHIL NFR BLD AUTO: 0 % — SIGNIFICANT CHANGE UP (ref 0–6)
GLUCOSE SERPL-MCNC: 73 MG/DL — SIGNIFICANT CHANGE UP (ref 70–99)
HAV IGM SER-ACNC: SIGNIFICANT CHANGE UP
HBV CORE IGM SER-ACNC: SIGNIFICANT CHANGE UP
HBV SURFACE AG SER-ACNC: SIGNIFICANT CHANGE UP
HCT VFR BLD CALC: 26.8 % — LOW (ref 34.5–45)
HCV AB S/CO SERPL IA: 0.28 S/CO — SIGNIFICANT CHANGE UP (ref 0–0.99)
HCV AB SERPL-IMP: SIGNIFICANT CHANGE UP
HGB BLD-MCNC: 8.5 G/DL — LOW (ref 11.5–15.5)
INR BLD: 1.33 RATIO — HIGH (ref 0.88–1.16)
LACTATE SERPL-SCNC: 10 MMOL/L — CRITICAL HIGH (ref 0.7–2)
LACTATE SERPL-SCNC: 2.1 MMOL/L — HIGH (ref 0.7–2)
LYMPHOCYTES # BLD AUTO: 0.28 K/UL — LOW (ref 1–3.3)
LYMPHOCYTES # BLD AUTO: 2 % — LOW (ref 13–44)
MCHC RBC-ENTMCNC: 29 PG — SIGNIFICANT CHANGE UP (ref 27–34)
MCHC RBC-ENTMCNC: 31.7 GM/DL — LOW (ref 32–36)
MCV RBC AUTO: 91.5 FL — SIGNIFICANT CHANGE UP (ref 80–100)
MONOCYTES # BLD AUTO: 0.28 K/UL — SIGNIFICANT CHANGE UP (ref 0–0.9)
MONOCYTES NFR BLD AUTO: 2 % — SIGNIFICANT CHANGE UP (ref 2–14)
NEUTROPHILS # BLD AUTO: 13.6 K/UL — HIGH (ref 1.8–7.4)
NEUTROPHILS NFR BLD AUTO: 88 % — HIGH (ref 43–77)
NRBC # BLD: SIGNIFICANT CHANGE UP /100 WBCS (ref 0–0)
PLATELET # BLD AUTO: 91 K/UL — LOW (ref 150–400)
POTASSIUM SERPL-MCNC: 3.9 MMOL/L — SIGNIFICANT CHANGE UP (ref 3.5–5.3)
POTASSIUM SERPL-SCNC: 3.9 MMOL/L — SIGNIFICANT CHANGE UP (ref 3.5–5.3)
PROT SERPL-MCNC: 7.1 GM/DL — SIGNIFICANT CHANGE UP (ref 6–8.3)
PROTHROM AB SERPL-ACNC: 15.4 SEC — HIGH (ref 10.6–13.6)
RAPID RVP RESULT: SIGNIFICANT CHANGE UP
RBC # BLD: 2.93 M/UL — LOW (ref 3.8–5.2)
RBC # FLD: 16 % — HIGH (ref 10.3–14.5)
SARS-COV-2 RNA SPEC QL NAA+PROBE: SIGNIFICANT CHANGE UP
SODIUM SERPL-SCNC: 132 MMOL/L — LOW (ref 135–145)
TROPONIN I, HIGH SENSITIVITY RESULT: 78.04 NG/L — HIGH
WBC # BLD: 14.17 K/UL — HIGH (ref 3.8–10.5)
WBC # FLD AUTO: 14.17 K/UL — HIGH (ref 3.8–10.5)

## 2022-02-04 PROCEDURE — 84145 PROCALCITONIN (PCT): CPT

## 2022-02-04 PROCEDURE — 80069 RENAL FUNCTION PANEL: CPT

## 2022-02-04 PROCEDURE — 93312 ECHO TRANSESOPHAGEAL: CPT

## 2022-02-04 PROCEDURE — 80048 BASIC METABOLIC PNL TOTAL CA: CPT

## 2022-02-04 PROCEDURE — C1752: CPT

## 2022-02-04 PROCEDURE — 77001 FLUOROGUIDE FOR VEIN DEVICE: CPT

## 2022-02-04 PROCEDURE — 71045 X-RAY EXAM CHEST 1 VIEW: CPT | Mod: 26

## 2022-02-04 PROCEDURE — 99285 EMERGENCY DEPT VISIT HI MDM: CPT

## 2022-02-04 PROCEDURE — 87103 BLOOD FUNGUS CULTURE: CPT

## 2022-02-04 PROCEDURE — 74176 CT ABD & PELVIS W/O CONTRAST: CPT

## 2022-02-04 PROCEDURE — 86850 RBC ANTIBODY SCREEN: CPT

## 2022-02-04 PROCEDURE — C1894: CPT

## 2022-02-04 PROCEDURE — 93005 ELECTROCARDIOGRAM TRACING: CPT

## 2022-02-04 PROCEDURE — 82272 OCCULT BLD FECES 1-3 TESTS: CPT

## 2022-02-04 PROCEDURE — 94640 AIRWAY INHALATION TREATMENT: CPT

## 2022-02-04 PROCEDURE — U0005: CPT

## 2022-02-04 PROCEDURE — G1004: CPT

## 2022-02-04 PROCEDURE — 86901 BLOOD TYPING SEROLOGIC RH(D): CPT

## 2022-02-04 PROCEDURE — 99223 1ST HOSP IP/OBS HIGH 75: CPT

## 2022-02-04 PROCEDURE — 71250 CT THORAX DX C-: CPT

## 2022-02-04 PROCEDURE — 87186 SC STD MICRODIL/AGAR DIL: CPT

## 2022-02-04 PROCEDURE — 83735 ASSAY OF MAGNESIUM: CPT

## 2022-02-04 PROCEDURE — 76937 US GUIDE VASCULAR ACCESS: CPT

## 2022-02-04 PROCEDURE — 87150 DNA/RNA AMPLIFIED PROBE: CPT

## 2022-02-04 PROCEDURE — 36415 COLL VENOUS BLD VENIPUNCTURE: CPT

## 2022-02-04 PROCEDURE — C1769: CPT

## 2022-02-04 PROCEDURE — 85025 COMPLETE CBC W/AUTO DIFF WBC: CPT

## 2022-02-04 PROCEDURE — 84100 ASSAY OF PHOSPHORUS: CPT

## 2022-02-04 PROCEDURE — 87493 C DIFF AMPLIFIED PROBE: CPT

## 2022-02-04 PROCEDURE — 87389 HIV-1 AG W/HIV-1&-2 AB AG IA: CPT

## 2022-02-04 PROCEDURE — 71045 X-RAY EXAM CHEST 1 VIEW: CPT

## 2022-02-04 PROCEDURE — 97163 PT EVAL HIGH COMPLEX 45 MIN: CPT | Mod: GP

## 2022-02-04 PROCEDURE — 93970 EXTREMITY STUDY: CPT

## 2022-02-04 PROCEDURE — 86923 COMPATIBILITY TEST ELECTRIC: CPT

## 2022-02-04 PROCEDURE — 74177 CT ABD & PELVIS W/CONTRAST: CPT | Mod: MG

## 2022-02-04 PROCEDURE — 97530 THERAPEUTIC ACTIVITIES: CPT | Mod: GP

## 2022-02-04 PROCEDURE — 99261: CPT

## 2022-02-04 PROCEDURE — U0003: CPT

## 2022-02-04 PROCEDURE — 93306 TTE W/DOPPLER COMPLETE: CPT

## 2022-02-04 PROCEDURE — 97116 GAIT TRAINING THERAPY: CPT | Mod: GP

## 2022-02-04 PROCEDURE — 86900 BLOOD TYPING SEROLOGIC ABO: CPT

## 2022-02-04 PROCEDURE — P9016: CPT

## 2022-02-04 PROCEDURE — 87040 BLOOD CULTURE FOR BACTERIA: CPT

## 2022-02-04 PROCEDURE — 80202 ASSAY OF VANCOMYCIN: CPT

## 2022-02-04 PROCEDURE — 82803 BLOOD GASES ANY COMBINATION: CPT

## 2022-02-04 PROCEDURE — 82962 GLUCOSE BLOOD TEST: CPT

## 2022-02-04 PROCEDURE — 80053 COMPREHEN METABOLIC PANEL: CPT

## 2022-02-04 PROCEDURE — P9047: CPT | Mod: JG

## 2022-02-04 PROCEDURE — 84484 ASSAY OF TROPONIN QUANT: CPT

## 2022-02-04 PROCEDURE — 93010 ELECTROCARDIOGRAM REPORT: CPT | Mod: 76

## 2022-02-04 PROCEDURE — 36556 INSERT NON-TUNNEL CV CATH: CPT

## 2022-02-04 PROCEDURE — 85730 THROMBOPLASTIN TIME PARTIAL: CPT

## 2022-02-04 PROCEDURE — 36430 TRANSFUSION BLD/BLD COMPNT: CPT

## 2022-02-04 PROCEDURE — 87077 CULTURE AEROBIC IDENTIFY: CPT

## 2022-02-04 PROCEDURE — 85027 COMPLETE CBC AUTOMATED: CPT

## 2022-02-04 PROCEDURE — 85610 PROTHROMBIN TIME: CPT

## 2022-02-04 PROCEDURE — 83605 ASSAY OF LACTIC ACID: CPT

## 2022-02-04 RX ORDER — LEVETIRACETAM 250 MG/1
250 TABLET, FILM COATED ORAL
Refills: 0 | Status: DISCONTINUED | OUTPATIENT
Start: 2022-02-04 | End: 2022-02-18

## 2022-02-04 RX ORDER — MELOXICAM 15 MG/1
1 TABLET ORAL
Qty: 0 | Refills: 0 | DISCHARGE

## 2022-02-04 RX ORDER — PANTOPRAZOLE SODIUM 20 MG/1
40 TABLET, DELAYED RELEASE ORAL
Refills: 0 | Status: DISCONTINUED | OUTPATIENT
Start: 2022-02-04 | End: 2022-02-18

## 2022-02-04 RX ORDER — COLLAGENASE CLOSTRIDIUM HIST. 250 UNIT/G
1 OINTMENT (GRAM) TOPICAL DAILY
Refills: 0 | Status: DISCONTINUED | OUTPATIENT
Start: 2022-02-04 | End: 2022-02-09

## 2022-02-04 RX ORDER — METOPROLOL TARTRATE 50 MG
50 TABLET ORAL
Refills: 0 | Status: DISCONTINUED | OUTPATIENT
Start: 2022-02-04 | End: 2022-02-06

## 2022-02-04 RX ORDER — ATOVAQUONE 750 MG/5ML
1500 SUSPENSION ORAL DAILY
Refills: 0 | Status: DISCONTINUED | OUTPATIENT
Start: 2022-02-04 | End: 2022-02-11

## 2022-02-04 RX ORDER — ACETAMINOPHEN 500 MG
1000 TABLET ORAL ONCE
Refills: 0 | Status: COMPLETED | OUTPATIENT
Start: 2022-02-04 | End: 2022-02-04

## 2022-02-04 RX ORDER — PIPERACILLIN AND TAZOBACTAM 4; .5 G/20ML; G/20ML
3.38 INJECTION, POWDER, LYOPHILIZED, FOR SOLUTION INTRAVENOUS EVERY 12 HOURS
Refills: 0 | Status: DISCONTINUED | OUTPATIENT
Start: 2022-02-04 | End: 2022-02-05

## 2022-02-04 RX ORDER — SODIUM CHLORIDE 9 MG/ML
500 INJECTION INTRAMUSCULAR; INTRAVENOUS; SUBCUTANEOUS ONCE
Refills: 0 | Status: COMPLETED | OUTPATIENT
Start: 2022-02-04 | End: 2022-02-04

## 2022-02-04 RX ORDER — GABAPENTIN 400 MG/1
1 CAPSULE ORAL
Qty: 0 | Refills: 0 | DISCHARGE

## 2022-02-04 RX ORDER — GABAPENTIN 400 MG/1
100 CAPSULE ORAL THREE TIMES A DAY
Refills: 0 | Status: DISCONTINUED | OUTPATIENT
Start: 2022-02-04 | End: 2022-02-18

## 2022-02-04 RX ORDER — LEVETIRACETAM 250 MG/1
500 TABLET, FILM COATED ORAL DAILY
Refills: 0 | Status: DISCONTINUED | OUTPATIENT
Start: 2022-02-04 | End: 2022-02-18

## 2022-02-04 RX ORDER — LOPERAMIDE HCL 2 MG
1 TABLET ORAL
Qty: 0 | Refills: 0 | DISCHARGE

## 2022-02-04 RX ORDER — LANOLIN ALCOHOL/MO/W.PET/CERES
3 CREAM (GRAM) TOPICAL AT BEDTIME
Refills: 0 | Status: DISCONTINUED | OUTPATIENT
Start: 2022-02-04 | End: 2022-02-18

## 2022-02-04 RX ORDER — VANCOMYCIN HCL 1 G
750 VIAL (EA) INTRAVENOUS ONCE
Refills: 0 | Status: COMPLETED | OUTPATIENT
Start: 2022-02-04 | End: 2022-02-04

## 2022-02-04 RX ORDER — SEVELAMER CARBONATE 2400 MG/1
800 POWDER, FOR SUSPENSION ORAL
Refills: 0 | Status: DISCONTINUED | OUTPATIENT
Start: 2022-02-04 | End: 2022-02-18

## 2022-02-04 RX ORDER — ACETAMINOPHEN 500 MG
650 TABLET ORAL EVERY 6 HOURS
Refills: 0 | Status: DISCONTINUED | OUTPATIENT
Start: 2022-02-04 | End: 2022-02-18

## 2022-02-04 RX ORDER — HYDROXYCHLOROQUINE SULFATE 200 MG
200 TABLET ORAL DAILY
Refills: 0 | Status: DISCONTINUED | OUTPATIENT
Start: 2022-02-04 | End: 2022-02-18

## 2022-02-04 RX ORDER — LISINOPRIL 2.5 MG/1
10 TABLET ORAL DAILY
Refills: 0 | Status: DISCONTINUED | OUTPATIENT
Start: 2022-02-04 | End: 2022-02-06

## 2022-02-04 RX ORDER — ALBUTEROL 90 UG/1
2 AEROSOL, METERED ORAL EVERY 6 HOURS
Refills: 0 | Status: DISCONTINUED | OUTPATIENT
Start: 2022-02-04 | End: 2022-02-18

## 2022-02-04 RX ORDER — HYDROXYCHLOROQUINE SULFATE 200 MG
1 TABLET ORAL
Qty: 0 | Refills: 0 | DISCHARGE

## 2022-02-04 RX ORDER — APIXABAN 2.5 MG/1
2.5 TABLET, FILM COATED ORAL
Refills: 0 | Status: DISCONTINUED | OUTPATIENT
Start: 2022-02-04 | End: 2022-02-08

## 2022-02-04 RX ORDER — DOCUSATE SODIUM 100 MG
1 CAPSULE ORAL
Qty: 0 | Refills: 0 | DISCHARGE

## 2022-02-04 RX ORDER — PIPERACILLIN AND TAZOBACTAM 4; .5 G/20ML; G/20ML
3.38 INJECTION, POWDER, LYOPHILIZED, FOR SOLUTION INTRAVENOUS ONCE
Refills: 0 | Status: COMPLETED | OUTPATIENT
Start: 2022-02-04 | End: 2022-02-04

## 2022-02-04 RX ORDER — HYDRALAZINE HCL 50 MG
25 TABLET ORAL THREE TIMES A DAY
Refills: 0 | Status: DISCONTINUED | OUTPATIENT
Start: 2022-02-04 | End: 2022-02-07

## 2022-02-04 RX ORDER — SENNA PLUS 8.6 MG/1
2 TABLET ORAL AT BEDTIME
Refills: 0 | Status: DISCONTINUED | OUTPATIENT
Start: 2022-02-04 | End: 2022-02-18

## 2022-02-04 RX ADMIN — Medication 1000 MILLIGRAM(S): at 16:13

## 2022-02-04 RX ADMIN — SODIUM CHLORIDE 500 MILLILITER(S): 9 INJECTION INTRAMUSCULAR; INTRAVENOUS; SUBCUTANEOUS at 14:05

## 2022-02-04 RX ADMIN — Medication 250 MILLIGRAM(S): at 16:06

## 2022-02-04 RX ADMIN — Medication 3 MILLIGRAM(S): at 23:23

## 2022-02-04 RX ADMIN — PIPERACILLIN AND TAZOBACTAM 200 GRAM(S): 4; .5 INJECTION, POWDER, LYOPHILIZED, FOR SOLUTION INTRAVENOUS at 15:22

## 2022-02-04 RX ADMIN — APIXABAN 2.5 MILLIGRAM(S): 2.5 TABLET, FILM COATED ORAL at 23:23

## 2022-02-04 RX ADMIN — Medication 1000 MILLIGRAM(S): at 16:12

## 2022-02-04 NOTE — ADVANCED PRACTICE NURSE CONSULT - ASSESSMENT
This is a 41 year old female admitted to the hospital on 1/18/2022 for Sepsis. PMH: Aphonia Lupus, Bacteriemia GERD, Epilepsy. Depression, COPD, Sepsis. Smoker, HTN, Smoker, Heroin abuse, Rheumatoid.   Assessed patient in the ER and patient is well know to [facility]. Wound to sacrum measures 4cm x 3.2 (non-stretched) cm x 0.4cm with 100% yellow slough. Applying collagenase with Aquacel covering with foam for enzymatic debridement Periwound intact and hyperpigment. patient is able to walk and position self.

## 2022-02-04 NOTE — ED ADULT TRIAGE NOTE - CHIEF COMPLAINT QUOTE
Pt BIBA from dialysis center with hx of renal failure, substance use, seizures.  P Pt BIBA from dialysis center with hx of renal failure, substance use, seizures.  Per EMS, pt was at dialysis and had witnessed episode of "very brief" seizure vs syncope.  Pt hypotensive and tchycardic at triage, but alert and able to answer questions appropriately. RN and MD notified and to bedside.

## 2022-02-04 NOTE — H&P ADULT - CONVERSATION DETAILS
Pt would like to be Full Code,  with trial of intubation if needed.  Pt states her Aunt , Dalia Hernandez is her HCP .

## 2022-02-04 NOTE — H&P ADULT - NSHPPHYSICALEXAM_GEN_ALL_CORE
ICU Vital Signs Last 24 Hrs  T(C): 39.7 (04 Feb 2022 15:15), Max: 39.7 (04 Feb 2022 15:15)  T(F): 103.5 (04 Feb 2022 15:15), Max: 103.5 (04 Feb 2022 15:15)  HR: 131 (04 Feb 2022 16:10) (131 - 136)  BP: 100/53 (04 Feb 2022 16:10) (68/50 - 100/53)    RR: 22 (04 Feb 2022 16:10) (18 - 22)  SpO2: 97% (04 Feb 2022 16:10) (96% - 97%)

## 2022-02-04 NOTE — ED PROVIDER NOTE - OBJECTIVE STATEMENT
42 yo female w/PMH of ESRD (M/W/F), GERD, bacteremia, seizures, epilepsy, depression, COPD, HTN, heroin abuse, RA, smoker presents to the ED BIBEMS from dialysis center for brief LOC, questionable seizure vs syncope. Pt states her normal seizures are whole body, today did not feel like her typical seizure. Pt also reports night sweats last night and chills this morning. Pt did not receive full dialysis today, had x1.5 hours done. Pt was recently admitted and intubated for aspiration pna. Pt denies any complaints now. Reports her brother takes care of her. Denies fever, cough, SOB, CP, abdominal pain or N/V/D.

## 2022-02-04 NOTE — ED PROVIDER NOTE - PHYSICAL EXAMINATION
GEN - chronically ill appearing; A+O x3   HEAD - NC/AT     EYES - EOMI, no conjunctival pallor, no scleral icterus  ENT -   mucous membranes  moist , no discharge      NECK - Neck supple  PULM - CTA b/l,  symmetric breath sounds  COR -  tachycardic, RR, S1 S2, no murmurs  ABD - , ND, NT, soft, no guarding, no rebound, no masses    BACK - no CVA tenderness, nontender spine     EXTREMS - no edema, no deformity, warm and well perfused    SKIN - no rash or bruising      NEUROLOGIC - alert, sensation nl, motor 5/5 RUE/LUE/RLE/LLE GEN - chronically ill appearing; A+O x3   HEAD - NC/AT     EYES - EOMI, no conjunctival pallor, no scleral icterus  ENT -   mucous membranes  moist , no discharge      NECK - Neck supple  PULM - CTA b/l,  symmetric breath sounds  COR -  tachycardic, RR, S1 S2, no murmurs  ABD - , ND, NT, soft, no guarding, no rebound, no masses    BACK - stage IV decub   EXTREMS - no edema, no deformity, warm and well perfused    SKIN - no rash or bruising      NEUROLOGIC - alert, sensation nl, motor 5/5 RUE/LUE/RLE/LLE

## 2022-02-04 NOTE — ADVANCED PRACTICE NURSE CONSULT - RECOMMEDATIONS
1)Continue to Elevate heels off of Mattress  2)Continue to Turn and position every 2 Hours  3)Consult Dietitian   4)Sacrum Wound: Apply Collagenase to wound bed and cover with a foam daily.

## 2022-02-04 NOTE — PATIENT PROFILE ADULT - FUNCTIONAL ASSESSMENT - DAILY ACTIVITY 4.
Surgery Post-Operative Note    Subjective:  Patient seen at bedside  Pt states has pain on R side and at operative site.   c/o L sided "heaviness" and muscle weakness. Denies chest pain, SOB.   States with known L 3rd and 4th digit numbness, unchanged  Tolerated reg diet  Voided    Admits to h/o stress test 1 week ago which was reportedly normal.    T(C): 36.7 (03-28-18 @ 17:00), Max: 37.4 (03-28-18 @ 11:38)  HR: 87 (03-28-18 @ 17:00) (79 - 94)  BP: 129/67 (03-28-18 @ 17:00) (119/73 - 132/77)  RR: 17 (03-28-18 @ 17:00) (11 - 19)  SpO2: 100% (03-28-18 @ 17:00) (100% - 100%)  Wt(kg): --    Physical Exam:    Gen: awake, alert oriented NAD  HEENT: anicteric  Abd: surgical wounds dressed c/d/i, mildly distended, incisional tenderness    MEDICATIONS  (STANDING):  sodium chloride 0.9% lock flush 3 milliLiter(s) IV Push every 8 hours    MEDICATIONS  (PRN):  morphine  - Injectable 2 milliGRAM(s) IV Push every 4 hours PRN Severe Pain  ondansetron Injectable 4 milliGRAM(s) IV Push every 6 hours PRN Nausea and/or Vomiting  oxyCODONE    5 mG/acetaminophen 325 mG 1 Tablet(s) Oral every 6 hours PRN Mild Pain (1 - 3)          I&O's Detail    28 Mar 2018 07:01  -  28 Mar 2018 19:09  --------------------------------------------------------  IN:    IV PiggyBack: 100 mL    Lactated Ringers IV Bolus: 500 mL  Total IN: 600 mL    OUT:  Total OUT: 0 mL    Total NET: 600 mL 3 = A little assistance

## 2022-02-04 NOTE — PHARMACOTHERAPY INTERVENTION NOTE - COMMENTS
Medication History Complete. Medications and allergies reviewed with patient's brother, Blake, and compared to Concepcion. All medication related questions answered.

## 2022-02-04 NOTE — H&P ADULT - HISTORY OF PRESENT ILLNESS
Pt is a 42 yo female w/PMH of ESRD (M/W/F), GERD, bacteremia, seizures, epilepsy, depression, COPD, HTN, heroin abuse, RA, smoker presents to the ED BIBEMS from dialysis center for brief LOC, questionable seizure vs syncope. Pt states her normal seizures are whole body, today did not feel like her typical seizure. Pt also reports night sweats last night and chills this morning. Pt did not receive full dialysis today, had x1.5 hours done. Pt was recently admitted and intubated for aspiration pna. Pt denies any complaints now. Reports her brother takes care of her. Denies fever, cough, SOB, CP, abdominal pain or  Pt is a pleasant 40 yo female w/PMH of ESRD (M/W/F), GERD, bacteremia, seizures, epilepsy, depression, COPD, HTN, heroin abuse, RA, smoker, recent admission for Aspiration Pna who  presents to [place]  ED via ambulance from dialysis center for brief loss of consciousness, questionable seizure vs syncope.   EMS reported she had a 15 sec generalized seizure and was AAOx4 afterwards.  Pt states she was having shivering and not having a seizure.  She reported that her normal seizures involve her  whole body, unlike today.    She c/o  night sweats last night and chills this morning.  Today she had  1.5 hours of hemodialysis instead of her usual HD.   Pt denies cpain/SOB, no cough or resp complaints, no  abd pain.  She reported having a loose stool and vomiting x1 today and two episodes yesterday.  She has urine once a day and denies dysuria. Pt is a pleasant 42 yo female w/PMH of ESRD (M/W/F), GERD, bacteremia, seizures, epilepsy, depression, COPD, HTN, heroin abuse, RA, Cdiff colitis in Dec 2021 smoker, recent admission for Aspiration Pna who  presents to [place]  ED via ambulance from dialysis center for brief loss of consciousness, questionable seizure vs syncope.   EMS reported she had a 15 sec generalized seizure and was AAOx4 afterwards.  Pt states she was having shivering and not having a seizure.  She reported that her normal seizures involve her  whole body, unlike today.    She c/o  night sweats last night and chills this morning.  Today she had  1.5 hours of hemodialysis instead of her usual HD.   Pt denies cpain/SOB, no cough or resp complaints, no  abd pain.  She reported having a loose stool and vomiting x1 today and two episodes yesterday.  She has urine once a day and denies dysuria.

## 2022-02-04 NOTE — PATIENT PROFILE ADULT - FALL HARM RISK - HARM RISK INTERVENTIONS

## 2022-02-04 NOTE — H&P ADULT - ASSESSMENT
Fever  Seizure  Sacral Decubitis  IVC Tip Thrombus  Abn CT Ab       Pt is admitted w/    Fever  Seizure  Sacral Decubitis  IVC Tip Thrombus  Lactate of 10  Abn CT Ab:   *There is thrombus surrounding the catheter tip in the intrahepatic IVC.  *Retroperitoneal adenopathy, as described above. Differential includes lymphoma.  *Questionable mild hyperenhancement of the urethra. Correlate with urinalysis.  *Mildly distended, fluid-filled colon. Mild thickening versus underdistention of the sigmoid colon.  *Left sacral decubitus ulcer without definite osseous erosion. Correlate  with MRI if clinically warranted.  - s/p 500 ml NS in the ED  - s/p Zosyn, Vanco in the ED, will cont  - lactate improving with vitals after IVF, Antibiotics  - check ua and blood cx  - ID consult  - Oncology consult : retroperit. adenopathy   - Vasc Consult re: IVC HD catheter tip thrombus,  discussed with Dr. Marley who advised to start eliquis 2.5 mg BID  - will also discont nicotine patch which may be contributing to thrombus formation  - apprec wound care consult  - Nephrology consult  - DVT proph: pt started on bid eliquis for IVC thrombus at HD cath  - Adv Dir: Full Code       Pt is admitted w/    Fever  Seizure  Sacral Decubitis  IVC Tip Thrombus  Lactate of 10  Abn CT Ab:   *There is thrombus surrounding the catheter tip in the intrahepatic IVC.  *Retroperitoneal adenopathy, as described above. Differential includes lymphoma.  *Questionable mild hyperenhancement of the urethra. Correlate with urinalysis.  *Mildly distended, fluid-filled colon. Mild thickening versus underdistention of the sigmoid colon.  *Left sacral decubitus ulcer without definite osseous erosion. Correlate  with MRI if clinically warranted.  - s/p 500 ml NS in the ED  - s/p Zosyn, Vanco in the ED, will cont  - lactate improving with vitals after IVF, and Antibiotics  - check ua and blood cx  - Note pt was on oral Vancomycin in Dec 2021 and states she now has 1-2 loose stools on most days  - ID consult  - Oncology consult : retroperit. adenopathy   - Vasc Consult re: IVC HD catheter tip thrombus,  discussed with Dr. Marley who advised to start eliquis 2.5 mg BID  - will also discont nicotine patch which may be contributing to thrombus formation  - apprec wound care consult  - Nephrology consult  - DVT proph: pt started on bid eliquis for IVC thrombus at HD cath  - Adv Dir: Full Code       Pt is admitted w/    Fever  Seizure  Sacral Decubitis  IVC Tip Thrombus  Lactate of 10  Abn CT Ab:   *There is thrombus surrounding the catheter tip in the intrahepatic IVC.  *Retroperitoneal adenopathy, as described above. Differential includes lymphoma.  *Questionable mild hyperenhancement of the urethra. Correlate with urinalysis.  *Mildly distended, fluid-filled colon. Mild thickening versus underdistention of the sigmoid colon.  *Left sacral decubitus ulcer without definite osseous erosion. Correlate  with MRI if clinically warranted.  - s/p 500 ml NS in the ED  - s/p Zosyn, Vanco in the ED, will cont  - lactate improving with vitals after IVF, and Antibiotics  - check ua to correlate with urethral hyperenhancement seen on CT abd  -  blood cx  - Note pt was on oral Vancomycin in Dec 2021 for Cdiff colitis and states she now has 1-2 loose stools on most days  - ID consult  - Oncology consult : retroperit. adenopathy   - Vasc Consult re: IVC HD catheter tip thrombus,  discussed with Dr. Marley who advised to start eliquis 2.5 mg BID  - will also discont nicotine patch which may be contributing to thrombus formation  - apprec wound care consult  - Nephrology consult  - DVT proph: pt started on bid eliquis for IVC thrombus at HD cath  - Adv Dir: Full Code

## 2022-02-04 NOTE — H&P ADULT - NSHPSOCIALHISTORY_GEN_ALL_CORE
Pt is a former 1/2 PPD smoker ,  > 30 pack yrs.  She denies current ETOH/drug use.   Reports her brother takes care of her.

## 2022-02-04 NOTE — ED ADULT NURSE REASSESSMENT NOTE - NS ED NURSE REASSESS COMMENT FT1
Pt A+Ox4. VSS. Pt given tylenol as ordered for temp. Will monitor for results. Stg 4 decubitus ulcer noted. Foam dssg in place. -130 on monitor. Monitored closely. Call bell in reach. Pt A+Ox4. VSS. Pt given tylenol as ordered for temp. Will monitor for results. Stg 4 decubitus ulcer noted. Foam dssg in place. RCW permacath in place. Dssg CD+I.  -130 on monitor. Monitored closely. Call bell in reach.

## 2022-02-04 NOTE — ED ADULT NURSE NOTE - NSIMPLEMENTINTERV_GEN_ALL_ED
Implemented All Fall Risk Interventions:  [place] to call system. Call bell, personal items and telephone within reach. Instruct patient to call for assistance. Room bathroom lighting operational. Non-slip footwear when patient is off stretcher. Physically safe environment: no spills, clutter or unnecessary equipment. Stretcher in lowest position, wheels locked, appropriate side rails in place. Provide visual cue, wrist band, yellow gown, etc. Monitor gait and stability. Monitor for mental status changes and reorient to person, place, and time. Review medications for side effects contributing to fall risk. Reinforce activity limits and safety measures with patient and family.

## 2022-02-04 NOTE — ED ADULT NURSE NOTE - CHIEF COMPLAINT QUOTE
Pt BIBA from dialysis center with hx of renal failure, substance use, seizures.  Per EMS, pt was at dialysis and had witnessed episode of "very brief" seizure vs syncope.  Pt hypotensive and tchycardic at triage, but alert and able to answer questions appropriately. RN and MD notified and to bedside.

## 2022-02-04 NOTE — H&P ADULT - REASON FOR ADMISSION
Fever  Seizure  Sacral Decubitis  IVC Tip Thrombus  Abn CT Ab Fever  Seizure  Sacral Decubitus  IVC Tip Thrombus  Abn CT Ab

## 2022-02-04 NOTE — H&P ADULT - NSHPLABSRESULTS_GEN_ALL_CORE
< from: CT Abdomen and Pelvis w/ IV Cont (02.04.22 @ 15:56) >      ACC: [id number] EXAM:  CT ABDOMEN AND PELVIS IC                          *** ADDENDUM***  : There is thrombus surrounding the catheter tip in the intrahepatic IVC.    Retroperitoneal adenopathy, as described above. Differential includes   lymphoma.    Mildly distended, fluid-filled colon. Mild thickening versus   underdistention of the sigmoid colon.    Left sacral decubitus ulcer without definite osseous erosion. Correlate   with MRI if clinically warranted.Questionable mild hyperenhancement of the urethra. Correlate with   urinalysis.    Findings were discussed with Dr. Epperson at 4:37 pm on 2/4/2022.  --- End of Report ---  *** END OF ADDENDUM***      PROCEDURE DATE:  02/04/2022      INTERPRETATION:  CLINICAL INFORMATION: Infected decubitus ulcer    COMPARISON: 1/21/2022, 1/18/2022.    CONTRAST/COMPLICATIONS:  IV Contrast: Omnipaque 350  90 cc administered   10 cc discarded  Oral Contrast: NONE  Complications: None reported at time of study completion    PROCEDURE:  CT of the Abdomen and Pelvis was performed.  Sagittal and coronal reformats were performed.    FINDINGS:  LOWER CHEST: Within normal limits.    LIVER: There is thrombus surrounding the catheter tip in the intrahepatic   IVC.  BILE DUCTS: Normal caliber.  GALLBLADDER: Cholecystectomy.  SPLEEN: Borderline enlarged.  PANCREAS: Within normal limits.  ADRENALS: Within normal limits.  KIDNEYS/URETERS: Within normal limits.    BLADDER: Within normal limits. Question mild hyperenhancement of the   urethra.  REPRODUCTIVE ORGANS: Uterus and adnexa within normal limits.    BOWEL: No bowel obstruction. Large amount of fluid/stool throughout the   colon. Question mild thickening versus underdistention of the sigmoid   colon. Appendix is not visualized. No evidence of inflammation in the   pericecal region.  PERITONEUM: No ascites.  VESSELS: Within the intrahepatic IVC, as described above.  RETROPERITONEUM/LYMPH NODES: Multiple enlarged retroperitoneal nodes. . A   left para-aortic node measures 1.9 x 1.4 cm (2:54). A left common iliac   node measures 2.4 x 1.3 cm (2:75)  ABDOMINAL WALL: Left sacral decubitus ulcer without definite evidence of   osseous erosion.  BONES: Degenerative changes. Degenerative changes of the right hip.    IMPRESSION:  There is thrombus surrounding the catheter tip in the intrahepatic IVC.    Retroperitoneal adenopathy, as described above. Differential includes   lymphoma.    Mildly distended, fluid-filled colon. Mild thickening versus   underdistention of the sigmoid colon.    Left sacral decubitus ulcer without definite osseous erosion. Correlate   with MRI if clinically warranted.    Additional findings as above.    --- End of Report ---    ***Please see the addendum at the top of this report. It may contain   additional important information or changes.****      JD JEFFRIES MD; Attending Radiologist  This document has been electronically signed. Feb 4 2022  4:28PM  Addend:JD JEFFRIES MD; Attending Radiologist  This addendum was electronically signed on: Feb 4 2022  4:42PM.  < end of copied text >

## 2022-02-04 NOTE — ED PROVIDER NOTE - CLINICAL SUMMARY MEDICAL DECISION MAKING FREE TEXT BOX
pt w/ seizure hx w/ possible seizure vs syncope at HD, found to be hypotensive/tachy concern for sepsis, recent admission for asp pna, will give abx, admit.

## 2022-02-04 NOTE — ED ADULT NURSE NOTE - OBJECTIVE STATEMENT
pt presents to ED with complaints of seizure verse syncope at HD center today. pt awake and alert at this time. reports she does not remember LOC. multiple attempts made for IV access made unsuccessfully. IV Team called for access and 20 g placed to left upper arm. pt has permacath in place to right chest wall and is not sure why by reports that right arm cannot be used for blood work of IV access. pt has sacral stage 4 wound (4 cm x 4.5 cm) that was discovered to be uncovered upon assessment. sacral dressing placed to wound. mepolex dressing placed to wound.

## 2022-02-04 NOTE — ED ADULT NURSE REASSESSMENT NOTE - NS ED NURSE REASSESS COMMENT FT1
spoke with wound care RN Ángel Nicholas at this time regarding sacral ulcer. advised to have MD order collagenase, Aquacel & foam dressing for wound care as this was pt's regimen upon last admission.

## 2022-02-05 LAB
ANION GAP SERPL CALC-SCNC: 13 MMOL/L — SIGNIFICANT CHANGE UP (ref 5–17)
BUN SERPL-MCNC: 31 MG/DL — HIGH (ref 7–23)
CALCIUM SERPL-MCNC: 8.1 MG/DL — LOW (ref 8.5–10.1)
CHLORIDE SERPL-SCNC: 93 MMOL/L — LOW (ref 96–108)
CO2 SERPL-SCNC: 19 MMOL/L — LOW (ref 22–31)
CREAT SERPL-MCNC: 6.29 MG/DL — HIGH (ref 0.5–1.3)
GLUCOSE SERPL-MCNC: 65 MG/DL — LOW (ref 70–99)
GRAM STN FLD: SIGNIFICANT CHANGE UP
HCT VFR BLD CALC: 22.8 % — LOW (ref 34.5–45)
HGB BLD-MCNC: 7.4 G/DL — LOW (ref 11.5–15.5)
MCHC RBC-ENTMCNC: 29.4 PG — SIGNIFICANT CHANGE UP (ref 27–34)
MCHC RBC-ENTMCNC: 32.5 GM/DL — SIGNIFICANT CHANGE UP (ref 32–36)
MCV RBC AUTO: 90.5 FL — SIGNIFICANT CHANGE UP (ref 80–100)
METHOD TYPE: SIGNIFICANT CHANGE UP
MRSA SPEC QL CULT: SIGNIFICANT CHANGE UP
PLATELET # BLD AUTO: 122 K/UL — LOW (ref 150–400)
POTASSIUM SERPL-MCNC: 5 MMOL/L — SIGNIFICANT CHANGE UP (ref 3.5–5.3)
POTASSIUM SERPL-SCNC: 5 MMOL/L — SIGNIFICANT CHANGE UP (ref 3.5–5.3)
RBC # BLD: 2.52 M/UL — LOW (ref 3.8–5.2)
RBC # FLD: 15.9 % — HIGH (ref 10.3–14.5)
SODIUM SERPL-SCNC: 125 MMOL/L — LOW (ref 135–145)
SPECIMEN SOURCE: SIGNIFICANT CHANGE UP
WBC # BLD: 25.17 K/UL — HIGH (ref 3.8–10.5)
WBC # FLD AUTO: 25.17 K/UL — HIGH (ref 3.8–10.5)

## 2022-02-05 PROCEDURE — 99232 SBSQ HOSP IP/OBS MODERATE 35: CPT

## 2022-02-05 RX ORDER — ALBUMIN HUMAN 25 %
50 VIAL (ML) INTRAVENOUS
Refills: 0 | Status: DISCONTINUED | OUTPATIENT
Start: 2022-02-05 | End: 2022-02-18

## 2022-02-05 RX ORDER — VANCOMYCIN HCL 1 G
1000 VIAL (EA) INTRAVENOUS ONCE
Refills: 0 | Status: COMPLETED | OUTPATIENT
Start: 2022-02-05 | End: 2022-02-05

## 2022-02-05 RX ORDER — ERYTHROPOIETIN 10000 [IU]/ML
10000 INJECTION, SOLUTION INTRAVENOUS; SUBCUTANEOUS
Refills: 0 | Status: DISCONTINUED | OUTPATIENT
Start: 2022-02-05 | End: 2022-02-18

## 2022-02-05 RX ORDER — ONDANSETRON 8 MG/1
4 TABLET, FILM COATED ORAL ONCE
Refills: 0 | Status: COMPLETED | OUTPATIENT
Start: 2022-02-05 | End: 2022-02-05

## 2022-02-05 RX ADMIN — Medication 1 APPLICATION(S): at 15:42

## 2022-02-05 RX ADMIN — Medication 50 MILLIGRAM(S): at 21:02

## 2022-02-05 RX ADMIN — Medication 1 DROP(S): at 21:03

## 2022-02-05 RX ADMIN — Medication 1 APPLICATION(S): at 22:16

## 2022-02-05 RX ADMIN — GABAPENTIN 100 MILLIGRAM(S): 400 CAPSULE ORAL at 06:18

## 2022-02-05 RX ADMIN — ONDANSETRON 4 MILLIGRAM(S): 8 TABLET, FILM COATED ORAL at 22:34

## 2022-02-05 RX ADMIN — Medication 650 MILLIGRAM(S): at 12:02

## 2022-02-05 RX ADMIN — Medication 250 MILLIGRAM(S): at 21:02

## 2022-02-05 RX ADMIN — Medication 1 DROP(S): at 22:50

## 2022-02-05 RX ADMIN — LEVETIRACETAM 500 MILLIGRAM(S): 250 TABLET, FILM COATED ORAL at 10:10

## 2022-02-05 RX ADMIN — Medication 1 APPLICATION(S): at 15:41

## 2022-02-05 RX ADMIN — Medication 650 MILLIGRAM(S): at 11:16

## 2022-02-05 RX ADMIN — Medication 1 DROP(S): at 10:15

## 2022-02-05 RX ADMIN — Medication 650 MILLIGRAM(S): at 17:59

## 2022-02-05 RX ADMIN — PANTOPRAZOLE SODIUM 40 MILLIGRAM(S): 20 TABLET, DELAYED RELEASE ORAL at 06:16

## 2022-02-05 RX ADMIN — ATOVAQUONE 1500 MILLIGRAM(S): 750 SUSPENSION ORAL at 10:12

## 2022-02-05 RX ADMIN — Medication 650 MILLIGRAM(S): at 03:31

## 2022-02-05 RX ADMIN — Medication 200 MILLIGRAM(S): at 10:10

## 2022-02-05 RX ADMIN — ERYTHROPOIETIN 10000 UNIT(S): 10000 INJECTION, SOLUTION INTRAVENOUS; SUBCUTANEOUS at 13:18

## 2022-02-05 RX ADMIN — SEVELAMER CARBONATE 800 MILLIGRAM(S): 2400 POWDER, FOR SUSPENSION ORAL at 10:10

## 2022-02-05 RX ADMIN — LISINOPRIL 10 MILLIGRAM(S): 2.5 TABLET ORAL at 10:11

## 2022-02-05 RX ADMIN — APIXABAN 2.5 MILLIGRAM(S): 2.5 TABLET, FILM COATED ORAL at 10:12

## 2022-02-05 RX ADMIN — Medication 650 MILLIGRAM(S): at 17:41

## 2022-02-05 RX ADMIN — SEVELAMER CARBONATE 800 MILLIGRAM(S): 2400 POWDER, FOR SUSPENSION ORAL at 17:42

## 2022-02-05 RX ADMIN — GABAPENTIN 100 MILLIGRAM(S): 400 CAPSULE ORAL at 15:40

## 2022-02-05 RX ADMIN — Medication 25 MILLIGRAM(S): at 21:02

## 2022-02-05 RX ADMIN — Medication 25 MILLIGRAM(S): at 15:39

## 2022-02-05 RX ADMIN — Medication 650 MILLIGRAM(S): at 03:01

## 2022-02-05 RX ADMIN — APIXABAN 2.5 MILLIGRAM(S): 2.5 TABLET, FILM COATED ORAL at 21:02

## 2022-02-05 RX ADMIN — Medication 50 MILLIGRAM(S): at 10:11

## 2022-02-05 NOTE — PROGRESS NOTE ADULT - ASSESSMENT
42 yo female w/PMH of ESRD (M/W/F), GERD, bacteremia, seizures, epilepsy, depression, COPD, HTN, heroin abuse, RA, Cdiff colitis in Dec 2021 smoker, recent admission for Aspiration Pna who  presents to [place]  ED via ambulance from dialysis center for brief loss of consciousness, questionable seizure vs syncope.   EMS reported she had a 15 sec generalized seizure and was AAOx4 afterwards.  Pt states she was having shivering and not having a seizure.  She reported that her normal seizures involve her  whole body, unlike today.    She c/o  night sweats last night and chills this morning.  Today she had  1.5 hours of hemodialysis instead of her usual HD.   Pt denies cpain/SOB, no cough or resp complaints, no  abd pain.  She reported having a loose stool and vomiting x1 today and two episodes yesterday.  She has urine once a day and denies dysuria.    1. seizure? now with clot near IVC  - currently she denies seizure  - she does not look postictal  -discontinue abx   - continue eliquis     2.  ESRD on hd  - HD per nephro    3. Rheum arth  - continue plaquenil    4. HTN  - continue coreg  - cntinue hydralazine    5. anemia  - Hb stable  - continue eliquis        DVT ppx : heparin     code: FULL

## 2022-02-05 NOTE — DIETITIAN INITIAL EVALUATION ADULT. - PHYSCIAL ASSESSMENT
Scot scale- 14   Skin: left buttock stage IV  Last BM- 2/5 stated by patient (not documented) Bowel meds: senna underweight/emaciated/debilitated

## 2022-02-05 NOTE — CONSULT NOTE ADULT - SUBJECTIVE AND OBJECTIVE BOX
40 yo female w/PMH of ESRD (M/W/F), GERD, bacteremia, seizures, epilepsy, depression, COPD, HTN, heroin abuse, RA, Cdiff colitis in Dec 2021 smoker, recent admission for Aspiration Pna who  presents to [place]  ED via ambulance from dialysis center for brief loss of consciousness, questionable seizure vs syncope.  Only got about 1.5 hours of HD on friday.       PAST MEDICAL & SURGICAL HISTORY:  Rheumatoid arthritis    H/O Raynaud&#x27;s syndrome    Heroin abuse    Smoker    Sepsis    HTN (hypertension)    COPD (chronic obstructive pulmonary disease)    Depression    Epilepsy    GERD (gastroesophageal reflux disease)    Bacteremia    Systemic lupus erythematosus    Aphonia    H/O tubal ligation    H/O appendicitis    Gall bladder stones    H/O tracheostomy    S/P percutaneous endoscopic gastrostomy (PEG) tube placement        MEDICATIONS  (STANDING):  apixaban 2.5 milliGRAM(s) Oral two times a day  artificial  tears Solution 1 Drop(s) Both EYES two times a day  artificial tears (preservative free) Ophthalmic Solution 1 Drop(s) Both EYES two times a day  atovaquone  Suspension 1500 milliGRAM(s) Oral daily  collagenase Ointment 1 Application(s) Topical daily  epoetin amee-epbx (RETACRIT) Injectable 57676 Unit(s) IV Push <User Schedule>  gabapentin 100 milliGRAM(s) Oral three times a day  hydrALAZINE 25 milliGRAM(s) Oral three times a day  hydroxychloroquine 200 milliGRAM(s) Oral daily  levETIRAcetam 250 milliGRAM(s) Oral <User Schedule>  levETIRAcetam 500 milliGRAM(s) Oral daily  lisinopril 10 milliGRAM(s) Oral daily  metoprolol tartrate 50 milliGRAM(s) Oral two times a day  pantoprazole    Tablet 40 milliGRAM(s) Oral before breakfast  sevelamer carbonate 800 milliGRAM(s) Oral three times a day with meals  silver sulfADIAZINE 1% Cream 1 Application(s) Topical two times a day    MEDICATIONS  (PRN):  acetaminophen     Tablet .. 650 milliGRAM(s) Oral every 6 hours PRN Temp greater or equal to 38.5C (101.3F), Mild Pain (1 - 3)  albumin human 25% IVPB 50 milliLiter(s) IV Intermittent every 1 hour PRN SBP less than 100  ALBUTerol    90 MICROgram(s) HFA Inhaler 2 Puff(s) Inhalation every 6 hours PRN Shortness of Breath and/or Wheezing  melatonin 3 milliGRAM(s) Oral at bedtime PRN Insomnia  senna 2 Tablet(s) Oral at bedtime PRN Constipation      Allergies    morphine (Rash)    Intolerances        SOCIAL HISTORY:  + drug abuse hx    FAMILY HISTORY:  Family history of cardiomyopathy (Mother)        REVIEW OF SYSTEMS:    CONSTITUTIONAL: stable weakness, fevers or chills  EYES/ENT: No visual changes;  No vertigo or throat pain   NECK: No pain or stiffness  RESPIRATORY: No cough, wheezing, hemoptysis; No current shortness of breath  CARDIOVASCULAR: No chest pain or palpitations  GASTROINTESTINAL: No abdominal or epigastric pain. No nausea, vomiting, or hematemesis; No diarrhea or constipation. No melena or hematochezia.  GENITOURINARY: No dysuria, frequency or hematuria  NEUROLOGICAL: No numbness or weakness  SKIN: No itching, burning, rashes, or lesions   All other review of systems is negative unless indicated above.      T(C): , Max: 39.7 (02-04-22 @ 15:15)  T(F): , Max: 103.5 (02-04-22 @ 15:15)  HR: 95 (02-05-22 @ 11:23)  BP: 124/80 (02-05-22 @ 11:23)  BP(mean): 82 (02-04-22 @ 22:29)  RR: 16 (02-05-22 @ 11:23)  SpO2: 99% (02-05-22 @ 07:54)  Wt(kg): --    Height (cm): 165.1 (02-04 @ 13:50)  Weight (kg): 45.6 (02-04 @ 23:39)  BMI (kg/m2): 16.7 (02-04 @ 23:39)  BSA (m2): 1.48 (02-04 @ 23:39)    PHYSICAL EXAM:    Constitutional: frail, >>stated age  HEENT: dry MM  Neck: No LAD, No JVD  Respiratory: dist  Cardiovascular: S1 and S2   Extremities: No peripheral edema  Neurological: Alert  : No Salgado  Skin: No rashes  Access: R marcy cath        LABS:                        8.5    14.17 )-----------( 91       ( 04 Feb 2022 14:06 )             26.8     05 Feb 2022 06:54    125    |  93     |  31     ----------------------------<  65     5.0     |  19     |  6.29   04 Feb 2022 14:06    132    |  94     |  23     ----------------------------<  73     3.9     |  19     |  5.84   02 Feb 2022 08:03    135    |  99     |  30     ----------------------------<  101    3.7     |  26     |  6.11     Ca    8.1        05 Feb 2022 06:54  Ca    8.1        04 Feb 2022 14:06  Ca    8.7        02 Feb 2022 08:03  Phos  5.7       02 Feb 2022 08:03    TPro  7.1    /  Alb  2.4    /  TBili  0.4    /  DBili  x      /  AST  79     /  ALT  22     /  AlkPhos  161    04 Feb 2022 14:06  TPro  x      /  Alb  2.0    /  TBili  x      /  DBili  x      /  AST  x      /  ALT  x      /  AlkPhos  x      02 Feb 2022 08:03      Hepatitis C Virus S/CO Ratio: 0.28 S/CO [0.00 - 0.99] (02-04 @ 14:11)  Hepatitis C Virus Interpretation: Nonreact (02-04 @ 14:11)      Urine Studies:          RADIOLOGY & ADDITIONAL STUDIES:

## 2022-02-05 NOTE — DIETITIAN NUTRITION RISK NOTIFICATION - ADDITIONAL COMMENTS/DIETITIAN RECOMMENDATIONS
· Additional Recommendations  1) continue with current diet order and encourage protein enriched foods with all meals 2) Nepro 8oz po TID (in between meals) 3) Maintain aspiration precautions, back of bed >35 degrees. 4) monitor daily weights track trends. 5) Nephrovit daily to meet RDI's, Zinc sulfate 220mg po daily x 10 days to promote wound healing

## 2022-02-05 NOTE — DIETITIAN INITIAL EVALUATION ADULT. - MALNUTRITION
Severe protein/calorie malnutrition in context of acute on chronic illness r/t inability to meet increased nutrient demands 2/2 sepsis AEB severe muscle/fat wasting and significant weight loss x 2 months (14%)

## 2022-02-05 NOTE — DIETITIAN NUTRITION RISK NOTIFICATION - TREATMENT: THE FOLLOWING DIET HAS BEEN RECOMMENDED
Diet, Renal Restrictions:   For patients receiving Renal Replacement - No Protein Restr, No Conc K, No Conc Phos, Low Sodium (02-04-22 @ 21:46) [Active]

## 2022-02-05 NOTE — CONSULT NOTE ADULT - SUBJECTIVE AND OBJECTIVE BOX
HPI:  Pt is a pleasant 42 yo female w/ PMH of ESRD (M/W/F), GERD, bacteremia, seizures, epilepsy, depression, COPD, HTN, heroin abuse, RA, C diff colitis in Dec 2021 smoker, recent admission for Aspiration Pna who  presents to [place]  ED via ambulance from dialysis center for brief loss of consciousness, questionable seizure vs syncope.   EMS reported she had a 15 sec generalized seizure and was A AOx4 afterwards.  Pt states she was having shivering and not having a seizure.  She reported that her normal seizures involve her whole body, unlike today.  She c/o  night sweats last night and chills this morning.  Today she had  1.5 hours of hemodialysis instead of her usual HD.   Pt denies chest pain/SOB, no cough or resp complaints, no  abd pain.  She reported having a loose stool and vomiting x1 today and two episodes yesterday.  She has urine once a day and denies dysuria. (04 Feb 2022 17:35)      PAST MEDICAL & SURGICAL HISTORY:  Rheumatoid arthritis    H/O Raynaud&#x27;s syndrome    Heroin abuse    Smoker    Sepsis    HTN (hypertension)    COPD (chronic obstructive pulmonary disease)    Depression    Epilepsy    GERD (gastroesophageal reflux disease)    Bacteremia    Systemic lupus erythematosus    Aphonia    H/O tubal ligation    H/O appendicitis    Gall bladder stones    H/O tracheostomy    S/P percutaneous endoscopic gastrostomy (PEG) tube placement        MEDICATIONS  (STANDING):  apixaban 2.5 milliGRAM(s) Oral two times a day  artificial  tears Solution 1 Drop(s) Both EYES two times a day  artificial tears (preservative free) Ophthalmic Solution 1 Drop(s) Both EYES two times a day  atovaquone  Suspension 1500 milliGRAM(s) Oral daily  collagenase Ointment 1 Application(s) Topical daily  epoetin amee-epbx (RETACRIT) Injectable 21007 Unit(s) IV Push <User Schedule>  gabapentin 100 milliGRAM(s) Oral three times a day  hydrALAZINE 25 milliGRAM(s) Oral three times a day  hydroxychloroquine 200 milliGRAM(s) Oral daily  levETIRAcetam 250 milliGRAM(s) Oral <User Schedule>  levETIRAcetam 500 milliGRAM(s) Oral daily  lisinopril 10 milliGRAM(s) Oral daily  metoprolol tartrate 50 milliGRAM(s) Oral two times a day  pantoprazole    Tablet 40 milliGRAM(s) Oral before breakfast  sevelamer carbonate 800 milliGRAM(s) Oral three times a day with meals  silver sulfADIAZINE 1% Cream 1 Application(s) Topical two times a day    MEDICATIONS  (PRN):  acetaminophen     Tablet .. 650 milliGRAM(s) Oral every 6 hours PRN Temp greater or equal to 38.5C (101.3F), Mild Pain (1 - 3)  albumin human 25% IVPB 50 milliLiter(s) IV Intermittent every 1 hour PRN SBP less than 100  ALBUTerol    90 MICROgram(s) HFA Inhaler 2 Puff(s) Inhalation every 6 hours PRN Shortness of Breath and/or Wheezing  melatonin 3 milliGRAM(s) Oral at bedtime PRN Insomnia  senna 2 Tablet(s) Oral at bedtime PRN Constipation      Allergies    morphine (Rash)    Intolerances        FAMILY HISTORY:  Family history of cardiomyopathy (Mother)        Review of Systems    Constitutional, Eyes, ENT, Cardiovascular, Respiratory, Gastrointestinal, Genitourinary, Musculoskeletal, Integumentary, Neurological, Psychiatric, Endocrine, Heme/Lymph and Allergic/Immunologic review of systems are otherwise negative except as noted in HPI.     Vital Signs Last 24 Hrs  T(C): 36.9 (05 Feb 2022 14:51), Max: 36.9 (04 Feb 2022 18:30)  T(F): 98.4 (05 Feb 2022 14:51), Max: 98.4 (04 Feb 2022 18:30)  HR: 94 (05 Feb 2022 14:51) (80 - 131)  BP: 99/57 (05 Feb 2022 14:51) (98/54 - 135/77)  BP(mean): 82 (04 Feb 2022 22:29) (82 - 82)  RR: 16 (05 Feb 2022 14:51) (16 - 22)  SpO2: 99% (05 Feb 2022 07:54) (97% - 100%)    Physical Exam  Constitutional: well developed, well nourished, in no acute distress and thin.   Eyes: PERRL, EOMI, no conjunctival infection, anicteric.   ENT: pharynx is unremarkable, moist mucus membrane, no oral lesions.   Neck: supple without JVD, no thyromegaly or masses appreciated.   Pulmonary: clear to auscultation bilaterally, no dullness, no wheezing.   Cardiac: RRR, normal S1S2, no murmurs, rubs, gallops.   Vascular: no JVD, no calf tenderness, venous stasis changes, varices.   Abdomen: normoactive bowel sounds, soft and nontender, no hepatosplenomegaly or masses appreciated.   Lymphatic: no peripheral adenopathy appreciated.   Musculoskeletal: full range of motion and no deformities appreciated.   Skin: normal appearance, no rash, nodules, vesicles, ulcers, erythema.   Neurology: grossly intact.   Psychiatric: affect appropriate.       LABS:  CBC Full  -  ( 05 Feb 2022 11:27 )  WBC Count : 25.17 K/uL  RBC Count : 2.52 M/uL  Hemoglobin : 7.4 g/dL  Hematocrit : 22.8 %  Platelet Count - Automated : 122 K/uL  Mean Cell Volume : 90.5 fl  Mean Cell Hemoglobin : 29.4 pg  Mean Cell Hemoglobin Concentration : 32.5 gm/dL  Auto Neutrophil # : x  Auto Lymphocyte # : x  Auto Monocyte # : x  Auto Eosinophil # : x  Auto Basophil # : x  Auto Neutrophil % : x  Auto Lymphocyte % : x  Auto Monocyte % : x  Auto Eosinophil % : x  Auto Basophil % : x    02-05    125<L>  |  93<L>  |  31<H>  ----------------------------<  65<L>  5.0   |  19<L>  |  6.29<H>    Ca    8.1<L>      05 Feb 2022 06:54    TPro  7.1  /  Alb  2.4<L>  /  TBili  0.4  /  DBili  x   /  AST  79<H>  /  ALT  22  /  AlkPhos  161<H>  02-04    PT/INR - ( 04 Feb 2022 14:06 )   PT: 15.4 sec;   INR: 1.33 ratio         PTT - ( 04 Feb 2022 14:06 )  PTT:35.9 sec      RADIOLOGY & ADDITIONAL STUDIES:  < from: CT Abdomen and Pelvis w/ IV Cont (02.04.22 @ 15:56) >    ACC: [id number] EXAM:  CT ABDOMEN AND PELVIS IC                          *** ADDENDUM***    Questionable mild hyperenhancement of the urethra. Correlate with   urinalysis.    Findings were discussed with Dr. Epperson at 4:37 pm on 2/4/2022.    --- End of Report ---    *** END OF ADDENDUM***      PROCEDURE DATE:  02/04/2022          INTERPRETATION:  CLINICAL INFORMATION: Infected decubitus ulcer    COMPARISON: 1/21/2022, 1/18/2022.    CONTRAST/COMPLICATIONS:  IV Contrast: Omnipaque 350  90 cc administered   10 cc discarded  Oral Contrast: NONE  Complications: None reported at time of study completion    PROCEDURE:  CT of the Abdomen and Pelvis was performed.  Sagittal and coronal reformats were performed.    FINDINGS:  LOWER CHEST: Within normal limits.    LIVER: There is thrombus surrounding the catheter tip in the intrahepatic   IVC.  BILE DUCTS: Normal caliber.  GALLBLADDER: Cholecystectomy.  SPLEEN: Borderline enlarged.  PANCREAS: Within normal limits.  ADRENALS: Within normal limits.  KIDNEYS/URETERS: Within normal limits.    BLADDER: Within normal limits. Question mild hyperenhancement of the   urethra.  REPRODUCTIVE ORGANS: Uterus and adnexa within normal limits.    BOWEL: No bowel obstruction. Large amount of fluid/stool throughout the   colon. Question mild thickening versus underdistention of the sigmoid   colon. Appendix is not visualized. No evidence of inflammation in the   pericecal region.  PERITONEUM: No ascites.  VESSELS: Within the intrahepatic IVC, as described above.  RETROPERITONEUM/LYMPH NODES: Multiple enlarged retroperitoneal nodes. . A   left para-aortic node measures 1.9 x 1.4 cm (2:54). A left common iliac   node measures 2.4 x 1.3 cm (2:75)  ABDOMINAL WALL: Left sacral decubitus ulcer without definite evidence of   osseous erosion.  BONES: Degenerative changes. Degenerative changes of the right hip.    IMPRESSION:  There is thrombus surrounding the catheter tip in the intrahepatic IVC.    Retroperitoneal adenopathy, as described above. Differential includes   lymphoma.    Mildly distended, fluid-filled colon. Mild thickening versus   underdistention of the sigmoid colon.    Left sacral decubitus ulcer without definite osseous erosion. Correlate   with MRI if clinically warranted.    Additional findings as above.    < end of copied text >

## 2022-02-05 NOTE — DIETITIAN INITIAL EVALUATION ADULT. - PERTINENT MEDS FT
MEDICATIONS  (STANDING):  apixaban 2.5 milliGRAM(s) Oral two times a day  artificial  tears Solution 1 Drop(s) Both EYES two times a day  artificial tears (preservative free) Ophthalmic Solution 1 Drop(s) Both EYES two times a day  atovaquone  Suspension 1500 milliGRAM(s) Oral daily  collagenase Ointment 1 Application(s) Topical daily  epoetin amee-epbx (RETACRIT) Injectable 19695 Unit(s) IV Push <User Schedule>  gabapentin 100 milliGRAM(s) Oral three times a day  hydrALAZINE 25 milliGRAM(s) Oral three times a day  hydroxychloroquine 200 milliGRAM(s) Oral daily  levETIRAcetam 250 milliGRAM(s) Oral <User Schedule>  levETIRAcetam 500 milliGRAM(s) Oral daily  lisinopril 10 milliGRAM(s) Oral daily  metoprolol tartrate 50 milliGRAM(s) Oral two times a day  pantoprazole    Tablet 40 milliGRAM(s) Oral before breakfast  sevelamer carbonate 800 milliGRAM(s) Oral three times a day with meals  silver sulfADIAZINE 1% Cream 1 Application(s) Topical two times a day    MEDICATIONS  (PRN):  acetaminophen     Tablet .. 650 milliGRAM(s) Oral every 6 hours PRN Temp greater or equal to 38.5C (101.3F), Mild Pain (1 - 3)  albumin human 25% IVPB 50 milliLiter(s) IV Intermittent every 1 hour PRN SBP less than 100  ALBUTerol    90 MICROgram(s) HFA Inhaler 2 Puff(s) Inhalation every 6 hours PRN Shortness of Breath and/or Wheezing  melatonin 3 milliGRAM(s) Oral at bedtime PRN Insomnia  senna 2 Tablet(s) Oral at bedtime PRN Constipation

## 2022-02-05 NOTE — DIETITIAN INITIAL EVALUATION ADULT. - ADD RECOMMEND
1) continue with current diet order and encourage protein enriched foods with all meals 2) Nepro 8oz po TID (in between meals) 3) Maintain aspiration precautions, back of bed >35 degrees. 4) monitor daily weights track trends. 5) Nephrovit daily to meet RDI's, Zinc sulfate 220mg po daily x 10 days to promote wound healing

## 2022-02-05 NOTE — PROGRESS NOTE ADULT - SUBJECTIVE AND OBJECTIVE BOX
2/5 : Patient is well known to me she was just discharged 2 days ago after a long stay . She does not look post ictal. She does not look septic. She is noted to have clot by her HD cath tip. She is on eliquis. I have discontinued her ABX     T(C): 36.9 (02-05-22 @ 07:54), Max: 39.7 (02-04-22 @ 15:15)  HR: 104 (02-05-22 @ 07:54) (93 - 136)  BP: 129/84 (02-05-22 @ 07:54) (68/50 - 129/84)  RR: 16 (02-05-22 @ 07:54) (16 - 22)  SpO2: 99% (02-05-22 @ 07:54) (96% - 100%)    CONSTITUTIONAL: Well groomed, no apparent distress    EYES: PERRLA and symmetric, EOMI, No conjunctival or scleral injection, non-icteric    ENMT: Oral mucosa with moist membranes. No external nasal lesions; nasal mucosa not inflamed; normal dentition; no pharyngeal injection or exudates. Otoscopic exam with normal tympanic membranes; no gross hearing impairment noted.  	NECK: Supple, symmetric and without tracheal deviation; thyroid gland not enlarged and without palpable masses    RESPIRATORY: No respiratory distress, no use of accessory muscles; CTA b/l, no wheezes, rales or rhonchi, no dullness or hyperresonance to percussion, no tactile fremitus, no subcutaneous emphysema    CARDIOVASCULAR: RRRR, +S1S2, no murmurs, no rubs, no gallops; no JVD; no peripheral edema  	Vascular: no carotid bruits; no abdominal bruit; carotid pulse palpable, radial pulse palpable, femoral pulse palpable, dorsalis pedis pulse palpable, posterior tibialis pulse palpable    GASTROINTESTINAL: Soft, non tender, non distended, no rebound, no guarding; No palpable masses; no hepatosplenomegaly; no hernia palpated;  	Rectal: normal sphincter tone and no masses palpated; stool negative for blood      LYMPHATIC: No cervical LAD or tenderness; no axillary LAD or tenderness; no inguinal LAD or tenderness    MUSCULOSKELETAL: Normal gait and station; no digital clubbing or cyanosis; examination of the (head/neck, spine/ribs/pelvis, RUE, LUE, RLE, LLE) without misalignment, normal range of motion without pain, no spinal tenderness, normal muscle strength/tone    SKIN: No rashes or ulcers noted; no subcutaneous nodules or induration palpable    NEUROLOGIC: CN II-XII intact; normal reflexes in upper and lower extremities, sensation intact in upper and lower extremities b/l to light touch; Babinski down b/l; no Kernig’s sign, no Brudzinski’s sign    PSYCHIATRIC: Appropriate insight/judgment; A+O x 3, mood and affect appropriate, recent/remote memory intact

## 2022-02-05 NOTE — DIETITIAN INITIAL EVALUATION ADULT. - OTHER INFO
42 yo female w/PMH of ESRD (M/W/F), GERD, bacteremia, seizures, epilepsy, depression, COPD, HTN, heroin abuse, RA, Cdiff colitis in Dec 2021 smoker, recent admission for Aspiration Pna who  presents to [place]  [facility] via ambulance from dialysis center for brief loss of consciousness, questionable seizure vs syncope.   EMS reported she had a 15 sec generalized seizure and was AAOx4 afterwards.  Pt states she was having shivering and not having a seizure.  She reported that her normal seizures involve her  whole body, unlike today.    She c/o  night sweats last night and chills this morning.  (2/5) had  1.5 hours of hemodialysis instead of her usual HD.   Pt denies cpain/SOB, no cough or respiratory complaints, no  abd pain.  She reported having a loose stool and vomiting x1 today (2/5) and two episodes yesterday (not documented).  She has urine once a day and denies dysuria. Recently discharged from  x 2 days ago and intubated during stay. Currently tolerating po diet (Renal) consuming ~50% of meals. Willing to consume shakes (renal friendly) in between meals. Pt appears extremely thin, bony, and malnourished. Significant weight loss x 2 months (documented hospital weights) 17#/~14%. Will continue to monitor and follow up prn. See below for recommendations. Criteria met for severe malnutrition.

## 2022-02-05 NOTE — DIETITIAN INITIAL EVALUATION ADULT. - PERTINENT LABORATORY DATA
02-05    125<L>  |  93<L>  |  31<H>  ----------------------------<  65<L>  5.0   |  19<L>  |  6.29<H>    Ca    8.1<L>      05 Feb 2022 06:54    TPro  7.1  /  Alb  2.4<L>  /  TBili  0.4  /  DBili  x   /  AST  79<H>  /  ALT  22  /  AlkPhos  161<H>  02-04  BMI: BMI (kg/m2): 16.7 (02-04-22 @ 23:39)  HbA1c:   Glucose: POCT Blood Glucose.: 146 mg/dL (01-26-22 @ 23:48)    BP: 129/84 (02-05-22 @ 07:54) (68/50 - 129/84)  Lipid Panel: Date/Time: 10-12-21 @ 09:54  Cholesterol, Serum: 137  Direct LDL: --  HDL Cholesterol, Serum: 14  Total Cholesterol/HDL Ration Measurement: --  Triglycerides, Serum: 470

## 2022-02-06 LAB
ANION GAP SERPL CALC-SCNC: 10 MMOL/L — SIGNIFICANT CHANGE UP (ref 5–17)
BUN SERPL-MCNC: 11 MG/DL — SIGNIFICANT CHANGE UP (ref 7–23)
CALCIUM SERPL-MCNC: 8.3 MG/DL — LOW (ref 8.5–10.1)
CHLORIDE SERPL-SCNC: 99 MMOL/L — SIGNIFICANT CHANGE UP (ref 96–108)
CO2 SERPL-SCNC: 20 MMOL/L — LOW (ref 22–31)
CREAT SERPL-MCNC: 3.63 MG/DL — HIGH (ref 0.5–1.3)
GLUCOSE SERPL-MCNC: 81 MG/DL — SIGNIFICANT CHANGE UP (ref 70–99)
HCT VFR BLD CALC: 26.4 % — LOW (ref 34.5–45)
HGB BLD-MCNC: 8.2 G/DL — LOW (ref 11.5–15.5)
MCHC RBC-ENTMCNC: 28.6 PG — SIGNIFICANT CHANGE UP (ref 27–34)
MCHC RBC-ENTMCNC: 31.1 GM/DL — LOW (ref 32–36)
MCV RBC AUTO: 92 FL — SIGNIFICANT CHANGE UP (ref 80–100)
PLATELET # BLD AUTO: 76 K/UL — LOW (ref 150–400)
POTASSIUM SERPL-MCNC: 3.2 MMOL/L — LOW (ref 3.5–5.3)
POTASSIUM SERPL-SCNC: 3.2 MMOL/L — LOW (ref 3.5–5.3)
RBC # BLD: 2.87 M/UL — LOW (ref 3.8–5.2)
RBC # FLD: 15.7 % — HIGH (ref 10.3–14.5)
SODIUM SERPL-SCNC: 129 MMOL/L — LOW (ref 135–145)
VANCOMYCIN TROUGH SERPL-MCNC: 27.5 UG/ML — CRITICAL HIGH (ref 10–20)
WBC # BLD: 18.6 K/UL — HIGH (ref 3.8–10.5)
WBC # FLD AUTO: 18.6 K/UL — HIGH (ref 3.8–10.5)

## 2022-02-06 PROCEDURE — 99233 SBSQ HOSP IP/OBS HIGH 50: CPT

## 2022-02-06 PROCEDURE — 99223 1ST HOSP IP/OBS HIGH 75: CPT

## 2022-02-06 RX ORDER — LOPERAMIDE HCL 2 MG
2 TABLET ORAL EVERY 6 HOURS
Refills: 0 | Status: DISCONTINUED | OUTPATIENT
Start: 2022-02-06 | End: 2022-02-18

## 2022-02-06 RX ORDER — METOPROLOL TARTRATE 50 MG
12.5 TABLET ORAL
Refills: 0 | Status: DISCONTINUED | OUTPATIENT
Start: 2022-02-06 | End: 2022-02-18

## 2022-02-06 RX ORDER — OXYCODONE HYDROCHLORIDE 5 MG/1
10 TABLET ORAL ONCE
Refills: 0 | Status: DISCONTINUED | OUTPATIENT
Start: 2022-02-06 | End: 2022-02-06

## 2022-02-06 RX ADMIN — Medication 650 MILLIGRAM(S): at 09:20

## 2022-02-06 RX ADMIN — APIXABAN 2.5 MILLIGRAM(S): 2.5 TABLET, FILM COATED ORAL at 10:42

## 2022-02-06 RX ADMIN — Medication 1 APPLICATION(S): at 08:24

## 2022-02-06 RX ADMIN — Medication 12.5 MILLIGRAM(S): at 21:02

## 2022-02-06 RX ADMIN — APIXABAN 2.5 MILLIGRAM(S): 2.5 TABLET, FILM COATED ORAL at 21:02

## 2022-02-06 RX ADMIN — SEVELAMER CARBONATE 800 MILLIGRAM(S): 2400 POWDER, FOR SUSPENSION ORAL at 08:05

## 2022-02-06 RX ADMIN — SEVELAMER CARBONATE 800 MILLIGRAM(S): 2400 POWDER, FOR SUSPENSION ORAL at 12:24

## 2022-02-06 RX ADMIN — Medication 2 MILLIGRAM(S): at 12:24

## 2022-02-06 RX ADMIN — Medication 1 APPLICATION(S): at 21:04

## 2022-02-06 RX ADMIN — LEVETIRACETAM 500 MILLIGRAM(S): 250 TABLET, FILM COATED ORAL at 10:42

## 2022-02-06 RX ADMIN — OXYCODONE HYDROCHLORIDE 10 MILLIGRAM(S): 5 TABLET ORAL at 18:45

## 2022-02-06 RX ADMIN — Medication 650 MILLIGRAM(S): at 15:04

## 2022-02-06 RX ADMIN — OXYCODONE HYDROCHLORIDE 10 MILLIGRAM(S): 5 TABLET ORAL at 18:13

## 2022-02-06 RX ADMIN — Medication 650 MILLIGRAM(S): at 08:25

## 2022-02-06 RX ADMIN — Medication 3 MILLIGRAM(S): at 21:02

## 2022-02-06 RX ADMIN — PANTOPRAZOLE SODIUM 40 MILLIGRAM(S): 20 TABLET, DELAYED RELEASE ORAL at 05:52

## 2022-02-06 RX ADMIN — ATOVAQUONE 1500 MILLIGRAM(S): 750 SUSPENSION ORAL at 10:43

## 2022-02-06 RX ADMIN — Medication 200 MILLIGRAM(S): at 10:43

## 2022-02-06 RX ADMIN — Medication 650 MILLIGRAM(S): at 16:00

## 2022-02-06 RX ADMIN — Medication 25 MILLIGRAM(S): at 13:03

## 2022-02-06 RX ADMIN — Medication 1 DROP(S): at 10:43

## 2022-02-06 RX ADMIN — Medication 25 MILLIGRAM(S): at 05:52

## 2022-02-06 RX ADMIN — Medication 650 MILLIGRAM(S): at 21:16

## 2022-02-06 RX ADMIN — SEVELAMER CARBONATE 800 MILLIGRAM(S): 2400 POWDER, FOR SUSPENSION ORAL at 17:07

## 2022-02-06 NOTE — PROGRESS NOTE ADULT - ASSESSMENT
42 yo female w/PMH of ESRD (M/W/F), GERD, bacteremia, seizures, epilepsy, depression, COPD, HTN, heroin abuse, RA, Cdiff colitis in Dec 2021 smoker, recent admission for Aspiration Pna who  presents to [place]  [facility] via ambulance from dialysis center for brief loss of consciousness, questionable seizure vs syncope.        ESRD  - Keep TTS HD for now, tolerated on 2/5  -K protocol  -UF as tolerated    Hyponatremia  -UF with HD, limit free water intake    HTN  -0ral meds    d/c with RN staff

## 2022-02-06 NOTE — PROGRESS NOTE ADULT - ASSESSMENT
40 yo female w/PMH of ESRD (M/W/F), GERD, bacteremia, seizures, epilepsy, depression, COPD, HTN, heroin abuse, RA, Cdiff colitis in Dec 2021 smoker, recent admission for Aspiration Pna who  presents to [place]  ED via ambulance from dialysis center for brief loss of consciousness, questionable seizure vs syncope.   EMS reported she had a 15 sec generalized seizure and was AAOx4 afterwards.  Pt states she was having shivering and not having a seizure.  She reported that her normal seizures involve her  whole body, unlike today.    She c/o  night sweats last night and chills this morning.  Today she had  1.5 hours of hemodialysis instead of her usual HD.   Pt denies cpain/SOB, no cough or resp complaints, no  abd pain.  She reported having a loose stool and vomiting x1 today and two episodes yesterday.  She has urine once a day and denies dysuria.    1. seizure? now with clot near IVC  - currently she denies seizure  - she does not look postictal  -discontinue abx   - continue eliquis     2. Bacteremia   - had cultures drawn in ER which wer epositive for gram positive cocci in clusters  - gave 1 dose vanco 1 gram on 2/5  - repeated blood cultrues today  - will get formal ID consult     3.  ESRD on hd  - HD per nephro    4. Rheum arth  - continue plaquenil    5. HTN  - holding coreg / hydralazine due to low bp     6. anemia  - Hb stable  - continue eliquis        DVT ppx : heparin     code: FULL   42 yo female w/PMH of ESRD (M/W/F), GERD, bacteremia, seizures, epilepsy, depression, COPD, HTN, heroin abuse, RA, Cdiff colitis in Dec 2021 smoker, recent admission for Aspiration Pna who  presents to [place]  [facility] via ambulance from dialysis center for brief loss of consciousness, questionable seizure vs syncope.   EMS reported she had a 15 sec generalized seizure and was AAOx4 afterwards.  Pt states she was having shivering and not having a seizure.  She reported that her normal seizures involve her  whole body, unlike today.    She c/o  night sweats last night and chills this morning.  Today she had  1.5 hours of hemodialysis instead of her usual HD.   Pt denies cpain/SOB, no cough or resp complaints, no  abd pain.  She reported having a loose stool and vomiting x1 today and two episodes yesterday.  She has urine once a day and denies dysuria.    1. seizure? now with clot near IVC  - currently she denies seizure  - she does not look postictal  -discontinue abx   - continue eliquis     2. Bacteremia   - had cultures drawn in ER which wer epositive for gram positive cocci in clusters  - gave 1 dose vanco 1 gram on 2/5  - repeated blood cultrues today  - will get formal ID consult     3.  ESRD on hd  - HD per nephro    4. Rheum arth  - continue plaquenil    5. HTN  discontinue lisinpril  - lowered metoprolol to 12.5 bid     6. anemia  - Hb stable  - continue eliquis        DVT ppx : heparin     code: FULL

## 2022-02-06 NOTE — CONSULT NOTE ADULT - SUBJECTIVE AND OBJECTIVE BOX
Patient is a 41y old  Female who presents with a chief complaint of Fever  Seizure  Sacral Decubitus  IVC Tip Thrombus  Abn CT Ab (2022 11:11)    HPI:  Pt is a pleasant 42 yo female w/PMH of ESRD (M/W/F), GERD, bacteremia, seizures, epilepsy, depression, COPD, HTN, heroin abuse, RA, Cdiff colitis in Dec 2021 smoker, recent admission for Aspiration Pna who  presents to [place]  ED via ambulance from dialysis center for brief loss of consciousness, questionable seizure vs syncope.   EMS reported she had a 15 sec generalized seizure and was AAOx4 afterwards.  Pt states she was having shivering and not having a seizure.  She reported that her normal seizures involve her  whole body, unlike today.    She c/o  night sweats last night and chills this morning.  Day of admit she had  1.5 hours of hemodialysis instead of her usual HD.   Pt denies cpain/SOB, no cough or resp complaints, no  abd pain.  She reported having a loose stool and vomiting x1 today and two episodes yesterday.  She has urine once a day and denies dysuria. Here noted with MRSA in blood cx. Has perm-catheter in place. Hx of substance abuse.      PMH: as above  PSH: as above  Meds: per reconciliation sheet, noted below  MEDICATIONS  (STANDING):  apixaban 2.5 milliGRAM(s) Oral two times a day  artificial tears (preservative free) Ophthalmic Solution 1 Drop(s) Both EYES two times a day  atovaquone  Suspension 1500 milliGRAM(s) Oral daily  collagenase Ointment 1 Application(s) Topical daily  epoetin amee-epbx (RETACRIT) Injectable 37659 Unit(s) IV Push <User Schedule>  gabapentin 100 milliGRAM(s) Oral three times a day  hydrALAZINE 25 milliGRAM(s) Oral three times a day  hydroxychloroquine 200 milliGRAM(s) Oral daily  levETIRAcetam 250 milliGRAM(s) Oral <User Schedule>  levETIRAcetam 500 milliGRAM(s) Oral daily  metoprolol tartrate 12.5 milliGRAM(s) Oral two times a day  pantoprazole    Tablet 40 milliGRAM(s) Oral before breakfast  sevelamer carbonate 800 milliGRAM(s) Oral three times a day with meals  silver sulfADIAZINE 1% Cream 1 Application(s) Topical two times a day      Allergies    morphine (Rash)    Intolerances      Social: no smoking, no alcohol, no illegal drugs; no recent travel, no exposure to TB  FAMILY HISTORY:  Family history of cardiomyopathy (Mother)       no history of premature cardiovascular disease in first degree relatives    ROS: + fever,  chills, no HA, no dizziness, no sore throat, no blurry vision, no CP, no palpitations, no SOB, no cough, no abdominal pain, no diarrhea, no N/V, no dysuria, no leg pain, no claudication, no rash, no joint aches, no rectal pain or bleeding, no night sweats    All other systems reviewed and are negative    Vital Signs Last 24 Hrs  T(C): 36.6 (2022 07:30), Max: 36.9 (2022 14:51)  T(F): 97.9 (2022 07:30), Max: 98.4 (2022 14:51)  HR: 70 (2022 10:41) (70 - 102)  BP: 108/70 (2022 07:30) (99/57 - 115/65)  BP(mean): --  RR: 18 (2022 07:30) (16 - 18)  SpO2: 99% (2022 07:30) (90% - 99%)  Daily     Daily Weight in k.2 (2022 05:47)    PE:    Constitutional: frail looking  HEENT: NC/AT, EOMI, PERRLA, conjunctivae clear; ears and nose atraumatic; pharynx benign  Neck: supple; thyroid not palpable  Back: no tenderness  Respiratory: respiratory effort normal; clear to auscultation  Cardiovascular: S1S2 regular, no murmurs  Abdomen: soft, not tender, not distended, positive BS; liver and spleen WNL  Genitourinary: no suprapubic tenderness  Lymphatic: no LN palpable  Musculoskeletal: no muscle tenderness, no joint swelling or tenderness  Extremities: no pedal edema  Neurological/ Psychiatric: AxOx3, Judgement and insight normal;  moving all extremities  Skin: no rashes; no palpable lesions perm cath in place, stage IV sacral ulcer     Labs: all available labs reviewed                        8.    1860 )-----------( 76       ( 2022 07:26 )             26.4     02-06    129<L>  |  99  |  11  ----------------------------<  81  3.2<L>   |  20<L>  |  3.63<H>    Ca    8.3<L>      2022 07:26    TPro  7.1  /  Alb  2.4<L>  /  TBili  0.4  /  DBili  x   /  AST  79<H>  /  ALT  22  /  AlkPhos  161<H>       LIVER FUNCTIONS - ( 2022 14:06 )  Alb: 2.4 g/dL / Pro: 7.1 gm/dL / ALK PHOS: 161 U/L / ALT: 22 U/L / AST: 79 U/L / GGT: x           Culture - Blood (22 @ 14:06)   - Methicillin resistant Staphylococcus aureus (MRSA): Detec   Gram Stain:   Growth in aerobic bottle: Gram Positive Cocci in Clusters   Specimen Source: .Blood Blood-Peripheral   Organism: Blood Culture PCR   Culture Results:   Growth in aerobic bottle: Gram Positive Cocci in Clusters   ***Blood Panel PCR results on this specimen are available   approximately 3 hours after the Gram stain result.***   Gram stain, PCR, and/or culture results may not always   correspond due to difference in methodologies.       Radiology: all available radiological tests reviewed    *** ADDENDUM***    Questionable mild hyperenhancement of the urethra. Correlate with   urinalysis.    Findings were discussed with Dr. Epperson at 4:37 pm on 2022.    --- End of Report ---    *** END OF ADDENDUM***      PROCEDURE DATE:  2022          INTERPRETATION:  CLINICAL INFORMATION: Infected decubitus ulcer    COMPARISON: 2022, 2022.    CONTRAST/COMPLICATIONS:  IV Contrast: Omnipaque 350  90 cc administered   10 cc discarded  Oral Contrast: NONE  Complications: None reported at time of study completion    PROCEDURE:  CT of the Abdomen and Pelvis was performed.  Sagittal and coronal reformats were performed.    FINDINGS:  LOWER CHEST: Within normal limits.    LIVER: There is thrombus surrounding the catheter tip in the intrahepatic   IVC.  BILE DUCTS: Normal caliber.  GALLBLADDER: Cholecystectomy.  SPLEEN: Borderline enlarged.  PANCREAS: Within normal limits.  ADRENALS: Within normal limits.  KIDNEYS/URETERS: Within normal limits.    BLADDER: Within normal limits. Question mild hyperenhancement of the   urethra.  REPRODUCTIVE ORGANS: Uterus and adnexa within normal limits.    BOWEL: No bowel obstruction. Large amount of fluid/stool throughout the   colon. Question mild thickening versus underdistention of the sigmoid   colon. Appendix is not visualized. No evidence of inflammation in the   pericecal region.  PERITONEUM: No ascites.  VESSELS: Within the intrahepatic IVC, as described above.  RETROPERITONEUM/LYMPH NODES: Multiple enlarged retroperitoneal nodes. . A   left para-aortic node measures 1.9 x 1.4 cm (2:54). A left common iliac   node measures 2.4 x 1.3 cm (2:75)  ABDOMINAL WALL: Left sacral decubitus ulcer without definite evidence of   osseous erosion.  BONES: Degenerative changes. Degenerative changes of the right hip.    IMPRESSION:  There is thrombus surrounding the catheter tip in the intrahepatic IVC.    Retroperitoneal adenopathy, as described above. Differential includes   lymphoma.    Mildly distended, fluid-filled colon. Mild thickening versus   underdistention of the sigmoid colon.    Left sacral decubitus ulcer without definite osseous erosion. Correlate   with MRI if clinically warranted.    Additional findings as above.      ***Please see the addendum at the top of this report. It may contain   additional important information or changes.****      < end of copied text >    Advanced directives addressed: full resuscitation

## 2022-02-06 NOTE — PROGRESS NOTE ADULT - SUBJECTIVE AND OBJECTIVE BOX
2/5 : Patient is well known to me she was just discharged 2 days ago after a long stay . She does not look post ictal. She does not look septic. She is noted to have clot by her HD cath tip. She is on eliquis. I have discontinued her ABX     2/6 : late last night patients blood cutlures x 1 were positive . I gave her a dose of vanco yesterday. Will repeat blood cultures and get vanco trough today. I have placed official ID consult . She is laying in bed comfortably with no complaints . I had long conversation again with her about life and death and she understands how poor her prognosis is     T(C): 36.9 (02-05-22 @ 07:54), Max: 39.7 (02-04-22 @ 15:15)  HR: 104 (02-05-22 @ 07:54) (93 - 136)  BP: 129/84 (02-05-22 @ 07:54) (68/50 - 129/84)  RR: 16 (02-05-22 @ 07:54) (16 - 22)  SpO2: 99% (02-05-22 @ 07:54) (96% - 100%)    CONSTITUTIONAL: Well groomed, no apparent distress    EYES: PERRLA and symmetric, EOMI, No conjunctival or scleral injection, non-icteric    ENMT: Oral mucosa with moist membranes. No external nasal lesions; nasal mucosa not inflamed; normal dentition; no pharyngeal injection or exudates. Otoscopic exam with normal tympanic membranes; no gross hearing impairment noted.  	NECK: Supple, symmetric and without tracheal deviation; thyroid gland not enlarged and without palpable masses    RESPIRATORY: No respiratory distress, no use of accessory muscles; CTA b/l, no wheezes, rales or rhonchi, no dullness or hyperresonance to percussion, no tactile fremitus, no subcutaneous emphysema    CARDIOVASCULAR: RRRR, +S1S2, no murmurs, no rubs, no gallops; no JVD; no peripheral edema  	Vascular: no carotid bruits; no abdominal bruit; carotid pulse palpable, radial pulse palpable, femoral pulse palpable, dorsalis pedis pulse palpable, posterior tibialis pulse palpable    GASTROINTESTINAL: Soft, non tender, non distended, no rebound, no guarding; No palpable masses; no hepatosplenomegaly; no hernia palpated;  	Rectal: normal sphincter tone and no masses palpated; stool negative for blood      LYMPHATIC: No cervical LAD or tenderness; no axillary LAD or tenderness; no inguinal LAD or tenderness    MUSCULOSKELETAL: Normal gait and station; no digital clubbing or cyanosis; examination of the (head/neck, spine/ribs/pelvis, RUE, LUE, RLE, LLE) without misalignment, normal range of motion without pain, no spinal tenderness, normal muscle strength/tone    SKIN: No rashes or ulcers noted; no subcutaneous nodules or induration palpable    NEUROLOGIC: CN II-XII intact; normal reflexes in upper and lower extremities, sensation intact in upper and lower extremities b/l to light touch; Babinski down b/l; no Kernig’s sign, no Brudzinski’s sign    PSYCHIATRIC: Appropriate insight/judgment; A+O x 3, mood and affect appropriate, recent/remote memory intact

## 2022-02-06 NOTE — PROGRESS NOTE ADULT - SUBJECTIVE AND OBJECTIVE BOX
Patient is a 41y Female who reports no complaints as new. Tolerated hD       MEDICATIONS  (STANDING):  apixaban 2.5 milliGRAM(s) Oral two times a day  artificial  tears Solution 1 Drop(s) Both EYES two times a day  artificial tears (preservative free) Ophthalmic Solution 1 Drop(s) Both EYES two times a day  atovaquone  Suspension 1500 milliGRAM(s) Oral daily  collagenase Ointment 1 Application(s) Topical daily  epoetin amee-epbx (RETACRIT) Injectable 87317 Unit(s) IV Push <User Schedule>  gabapentin 100 milliGRAM(s) Oral three times a day  hydrALAZINE 25 milliGRAM(s) Oral three times a day  hydroxychloroquine 200 milliGRAM(s) Oral daily  levETIRAcetam 250 milliGRAM(s) Oral <User Schedule>  levETIRAcetam 500 milliGRAM(s) Oral daily  lisinopril 10 milliGRAM(s) Oral daily  metoprolol tartrate 50 milliGRAM(s) Oral two times a day  pantoprazole    Tablet 40 milliGRAM(s) Oral before breakfast  sevelamer carbonate 800 milliGRAM(s) Oral three times a day with meals  silver sulfADIAZINE 1% Cream 1 Application(s) Topical two times a day    MEDICATIONS  (PRN):  acetaminophen     Tablet .. 650 milliGRAM(s) Oral every 6 hours PRN Temp greater or equal to 38.5C (101.3F), Mild Pain (1 - 3)  albumin human 25% IVPB 50 milliLiter(s) IV Intermittent every 1 hour PRN SBP less than 100  ALBUTerol    90 MICROgram(s) HFA Inhaler 2 Puff(s) Inhalation every 6 hours PRN Shortness of Breath and/or Wheezing  melatonin 3 milliGRAM(s) Oral at bedtime PRN Insomnia  senna 2 Tablet(s) Oral at bedtime PRN Constipation        T(C): , Max: 36.9 (02-05-22 @ 14:51)  T(F): , Max: 98.4 (02-05-22 @ 14:51)  HR: 93 (02-06-22 @ 05:40)  BP: 108/69 (02-06-22 @ 05:40)  BP(mean): --  RR: 16 (02-05-22 @ 20:00)  SpO2: 90% (02-06-22 @ 05:40)  Wt(kg): --    02-05 @ 07:01  -  02-06 @ 07:00  --------------------------------------------------------  IN: 500 mL / OUT: 1800 mL / NET: -1300 mL          PHYSICAL EXAM:    Constitutional: frail, >stated age  dry  Respiratory: ronchi  Cardiovascular: S1 and S   chronic changes  Neurological: alert  catheter         LABS:                        8.2    18.60 )-----------( 76       ( 06 Feb 2022 07:26 )             26.4     06 Feb 2022 07:26    129    |  99     |  11     ----------------------------<  81     3.2     |  20     |  3.63   05 Feb 2022 06:54    125    |  93     |  31     ----------------------------<  65     5.0     |  19     |  6.29   04 Feb 2022 14:06    132    |  94     |  23     ----------------------------<  73     3.9     |  19     |  5.84     Ca    8.3        06 Feb 2022 07:26  Ca    8.1        05 Feb 2022 06:54  Ca    8.1        04 Feb 2022 14:06    TPro  7.1    /  Alb  2.4    /  TBili  0.4    /  DBili  x      /  AST  79     /  ALT  22     /  AlkPhos  161    04 Feb 2022 14:06          Urine Studies:          RADIOLOGY & ADDITIONAL STUDIES:

## 2022-02-07 LAB
-  AMPICILLIN/SULBACTAM: SIGNIFICANT CHANGE UP
-  CEFAZOLIN: SIGNIFICANT CHANGE UP
-  CLINDAMYCIN: SIGNIFICANT CHANGE UP
-  DAPTOMYCIN: SIGNIFICANT CHANGE UP
-  ERYTHROMYCIN: SIGNIFICANT CHANGE UP
-  GENTAMICIN: SIGNIFICANT CHANGE UP
-  LINEZOLID: SIGNIFICANT CHANGE UP
-  OXACILLIN: SIGNIFICANT CHANGE UP
-  PENICILLIN: SIGNIFICANT CHANGE UP
-  RIFAMPIN: SIGNIFICANT CHANGE UP
-  TETRACYCLINE: SIGNIFICANT CHANGE UP
-  TRIMETHOPRIM/SULFAMETHOXAZOLE: SIGNIFICANT CHANGE UP
-  VANCOMYCIN: SIGNIFICANT CHANGE UP
ANION GAP SERPL CALC-SCNC: 11 MMOL/L — SIGNIFICANT CHANGE UP (ref 5–17)
BUN SERPL-MCNC: 18 MG/DL — SIGNIFICANT CHANGE UP (ref 7–23)
C DIFF BY PCR RESULT: SIGNIFICANT CHANGE UP
C DIFF TOX GENS STL QL NAA+PROBE: SIGNIFICANT CHANGE UP
CALCIUM SERPL-MCNC: 8.5 MG/DL — SIGNIFICANT CHANGE UP (ref 8.5–10.1)
CHLORIDE SERPL-SCNC: 99 MMOL/L — SIGNIFICANT CHANGE UP (ref 96–108)
CO2 SERPL-SCNC: 20 MMOL/L — LOW (ref 22–31)
CREAT SERPL-MCNC: 4.88 MG/DL — HIGH (ref 0.5–1.3)
CULTURE RESULTS: SIGNIFICANT CHANGE UP
GLUCOSE SERPL-MCNC: 84 MG/DL — SIGNIFICANT CHANGE UP (ref 70–99)
METHOD TYPE: SIGNIFICANT CHANGE UP
ORGANISM # SPEC MICROSCOPIC CNT: SIGNIFICANT CHANGE UP
POTASSIUM SERPL-MCNC: 3.1 MMOL/L — LOW (ref 3.5–5.3)
POTASSIUM SERPL-SCNC: 3.1 MMOL/L — LOW (ref 3.5–5.3)
SODIUM SERPL-SCNC: 130 MMOL/L — LOW (ref 135–145)
SPECIMEN SOURCE: SIGNIFICANT CHANGE UP
VANCOMYCIN TROUGH SERPL-MCNC: 24.8 UG/ML — HIGH (ref 10–20)

## 2022-02-07 PROCEDURE — 99232 SBSQ HOSP IP/OBS MODERATE 35: CPT

## 2022-02-07 PROCEDURE — 99221 1ST HOSP IP/OBS SF/LOW 40: CPT

## 2022-02-07 PROCEDURE — 93970 EXTREMITY STUDY: CPT | Mod: 26

## 2022-02-07 RX ORDER — DIPHENHYDRAMINE HCL 50 MG
25 CAPSULE ORAL ONCE
Refills: 0 | Status: COMPLETED | OUTPATIENT
Start: 2022-02-07 | End: 2022-02-07

## 2022-02-07 RX ORDER — ERYTHROPOIETIN 10000 [IU]/ML
10000 INJECTION, SOLUTION INTRAVENOUS; SUBCUTANEOUS ONCE
Refills: 0 | Status: COMPLETED | OUTPATIENT
Start: 2022-02-07 | End: 2022-02-07

## 2022-02-07 RX ORDER — DIPHENHYDRAMINE HCL 50 MG
25 CAPSULE ORAL EVERY 6 HOURS
Refills: 0 | Status: DISCONTINUED | OUTPATIENT
Start: 2022-02-07 | End: 2022-02-18

## 2022-02-07 RX ORDER — OXYCODONE HYDROCHLORIDE 5 MG/1
5 TABLET ORAL ONCE
Refills: 0 | Status: DISCONTINUED | OUTPATIENT
Start: 2022-02-07 | End: 2022-02-07

## 2022-02-07 RX ORDER — VANCOMYCIN HCL 1 G
500 VIAL (EA) INTRAVENOUS ONCE
Refills: 0 | Status: COMPLETED | OUTPATIENT
Start: 2022-02-07 | End: 2022-02-07

## 2022-02-07 RX ADMIN — Medication 200 MILLIGRAM(S): at 09:13

## 2022-02-07 RX ADMIN — Medication 650 MILLIGRAM(S): at 12:28

## 2022-02-07 RX ADMIN — Medication 100 MILLIGRAM(S): at 17:10

## 2022-02-07 RX ADMIN — Medication 2 MILLIGRAM(S): at 18:36

## 2022-02-07 RX ADMIN — SEVELAMER CARBONATE 800 MILLIGRAM(S): 2400 POWDER, FOR SUSPENSION ORAL at 16:44

## 2022-02-07 RX ADMIN — LEVETIRACETAM 250 MILLIGRAM(S): 250 TABLET, FILM COATED ORAL at 16:44

## 2022-02-07 RX ADMIN — Medication 1 DROP(S): at 22:00

## 2022-02-07 RX ADMIN — Medication 1 APPLICATION(S): at 09:13

## 2022-02-07 RX ADMIN — SEVELAMER CARBONATE 800 MILLIGRAM(S): 2400 POWDER, FOR SUSPENSION ORAL at 09:13

## 2022-02-07 RX ADMIN — LEVETIRACETAM 500 MILLIGRAM(S): 250 TABLET, FILM COATED ORAL at 09:13

## 2022-02-07 RX ADMIN — Medication 3 MILLIGRAM(S): at 21:24

## 2022-02-07 RX ADMIN — Medication 12.5 MILLIGRAM(S): at 21:25

## 2022-02-07 RX ADMIN — Medication 1 APPLICATION(S): at 22:00

## 2022-02-07 RX ADMIN — Medication 1 APPLICATION(S): at 09:14

## 2022-02-07 RX ADMIN — Medication 25 MILLIGRAM(S): at 01:13

## 2022-02-07 RX ADMIN — Medication 2 MILLIGRAM(S): at 12:27

## 2022-02-07 RX ADMIN — Medication 650 MILLIGRAM(S): at 13:28

## 2022-02-07 RX ADMIN — ATOVAQUONE 1500 MILLIGRAM(S): 750 SUSPENSION ORAL at 09:13

## 2022-02-07 RX ADMIN — APIXABAN 2.5 MILLIGRAM(S): 2.5 TABLET, FILM COATED ORAL at 21:25

## 2022-02-07 RX ADMIN — Medication 12.5 MILLIGRAM(S): at 09:13

## 2022-02-07 RX ADMIN — Medication 25 MILLIGRAM(S): at 18:37

## 2022-02-07 RX ADMIN — OXYCODONE HYDROCHLORIDE 5 MILLIGRAM(S): 5 TABLET ORAL at 17:35

## 2022-02-07 RX ADMIN — OXYCODONE HYDROCHLORIDE 5 MILLIGRAM(S): 5 TABLET ORAL at 18:34

## 2022-02-07 RX ADMIN — APIXABAN 2.5 MILLIGRAM(S): 2.5 TABLET, FILM COATED ORAL at 09:13

## 2022-02-07 RX ADMIN — ERYTHROPOIETIN 10000 UNIT(S): 10000 INJECTION, SOLUTION INTRAVENOUS; SUBCUTANEOUS at 14:51

## 2022-02-07 RX ADMIN — Medication 1 DROP(S): at 09:14

## 2022-02-07 NOTE — PROGRESS NOTE ADULT - ASSESSMENT
42 yo female w/PMH of ESRD (M/W/F), GERD, bacteremia, seizures, epilepsy, depression, COPD, HTN, heroin abuse, RA, Cdiff colitis in Dec 2021 smoker, recent admission for Aspiration Pna who  presents to [facility]  [facility] via ambulance from dialysis center for brief loss of consciousness, questionable seizure vs syncope.   EMS reported she had a 15 sec generalized seizure and was AAOx4 afterwards.  Pt states she was having shivering and not having a seizure.  She reported that her normal seizures involve her  whole body, unlike today.    She c/o  night sweats last night and chills this morning.  Today she had  1.5 hours of hemodialysis instead of her usual HD.   Pt denies cpain/SOB, no cough or resp complaints, no  abd pain.  She reported having a loose stool and vomiting x1 today and two episodes yesterday.  She has urine once a day and denies dysuria.    #. seizure?   now with clot near IVC  - currently she denies seizure  - she does not look postictal  -discontinue abx   - continue eliquis     #MRSA  Bacteremia   - had cultures drawn in ER which  positive for MRSA   - gave 1 dose vanco 1 gram on 2/5  - repeated blood cultures today  - will get formal ID consult   - Nephrology Planning to remove permacath   Ordering Echo     #  ESRD on hd  - HD per nephro    # Rheum arth  - continue plaquenil    # HTN  discontinue lisinopril  - lowered metoprolol to 12.5 bid     # anemia  - Hb stable  - continue eliquis    DVT ppx : heparin     code: FULL   40 yo female w/PMH of ESRD (M/W/F), GERD, bacteremia, seizures, epilepsy, depression, COPD, HTN, heroin abuse, RA, Cdiff colitis in Dec 2021 smoker, recent admission for Aspiration Pna who  presents to [place]  [facility] via ambulance from dialysis center for brief loss of consciousness, questionable seizure vs syncope.   EMS reported she had a 15 sec generalized seizure and was AAOx4 afterwards.  Pt states she was having shivering and not having a seizure.  She reported that her normal seizures involve her  whole body, unlike today.    She c/o  night sweats last night and chills this morning.  Today she had  1.5 hours of hemodialysis instead of her usual HD.   Pt denies cpain/SOB, no cough or resp complaints, no  abd pain.  She reported having a loose stool and vomiting x1 today and two episodes yesterday.  She has urine once a day and denies dysuria.    #. seizure?   now with clot near IVC  - currently she denies seizure  - she does not look postictal  -discontinue abx   - continue eliquis     #MRSA  Bacteremia   - had cultures drawn in ER which  positive for MRSA   - gave 1 dose vanco 1 gram on 2/5  - repeated blood cultures today  - will get formal ID consult   - Nephrology Planning to remove permacath   Ordering Echo     #  ESRD on hd  - HD per nephro    # Rheum arth  - continue plaquenil    # HTN  discontinue lisinopril  - lowered metoprolol to 12.5 bid     #Anemia  - Hb stable  - continue eliquis    # Stage 4 Sacral Ulcer present on admission   Frequent turn   Wound care daily , on Collagenase   Wound care Consulted     DVT ppx : heparin     code: FULL   40 yo female w/PMH of ESRD (M/W/F), GERD, bacteremia, seizures, epilepsy, depression, COPD, HTN, heroin abuse, RA, Cdiff colitis in Dec 2021 smoker, recent admission for Aspiration Pna who  presents to [place]  [facility] via ambulance from dialysis center for brief loss of consciousness, questionable seizure vs syncope.   EMS reported she had a 15 sec generalized seizure and was AAOx4 afterwards.  Pt states she was having shivering and not having a seizure.  She reported that her normal seizures involve her  whole body, unlike today.    She c/o  night sweats last night and chills this morning.  Today she had  1.5 hours of hemodialysis instead of her usual HD.   Pt denies cpain/SOB, no cough or resp complaints, no  abd pain.  She reported having a loose stool and vomiting x1 today and two episodes yesterday.  She has urine once a day and denies dysuria.    #. seizure?   now with clot near IVC  - currently she denies seizure  - she does not look postictal  -discontinue abx   - continue eliquis     #MRSA  Bacteremia   - had cultures drawn in ER which  positive for MRSA   - gave 1 dose vanco 1 gram on 2/5  - repeated blood cultures today  - will get formal ID consult   - Nephrology Planning to remove permacath   Ordering Echo     #  ESRD on hd  - HD   - HD per nephro    # Retroperitoneal Adenopathy.   - Oncology evaluated the patient , recommended IR guided Biopsy, As per Dr Hemran   Can be done as outpatient.  Patient on ELiquis, with a discal thrombus on the Permacath       # Rheum arth  - continue plaquenil    # HTN  discontinue lisinopril  - lowered metoprolol to 12.5 bid     #Anemia  - Hb stable  - continue eliquis    # Stage 4 Sacral Ulcer present on admission   Frequent turn   Wound care daily , on Collagenase   Wound care Consulted     DVT ppx : heparin     code: FULL   42 yo female w/PMH of ESRD (M/W/F), GERD, bacteremia, seizures, epilepsy, depression, COPD, HTN, heroin abuse, RA, Cdiff colitis in Dec 2021 smoker, recent admission for Aspiration Pna who  presents to [place]  [facility] via ambulance from dialysis center for brief loss of consciousness, questionable seizure vs syncope.   EMS reported she had a 15 sec generalized seizure and was AAOx4 afterwards.  Pt states she was having shivering and not having a seizure.  She reported that her normal seizures involve her  whole body, unlike today.    She c/o  night sweats last night and chills this morning.  Today she had  1.5 hours of hemodialysis instead of her usual HD.   Pt denies cpain/SOB, no cough or resp complaints, no  abd pain.  She reported having a loose stool and vomiting x1 today and two episodes yesterday.  She has urine once a day and denies dysuria.    #. seizure?   now with clot near IVC  - currently she denies seizure  - she does not look postictal  -discontinue abx   - continue eliquis     #MRSA  Bacteremia   - had cultures drawn in ER which  positive for MRSA   - gave 1 dose vanco 1 gram on 2/5  - repeated blood cultures today  - will get formal ID consult   - Nephrology Planning to remove permacath   Ordering Echo     #  ESRD on hd  - HD   - HD per nephro    # Retroperitoneal Adenopathy.   - Oncology evaluated the patient , recommended IR guided Biopsy, As per Dr Herman   Can be done as outpatient.  Patient on ELiquis, with a discal thrombus on the Permacath needing AC   I will hold on IR ZGuided Biopsy for now       # Rheum arth  - continue plaquenil    # HTN  discontinue lisinopril  - lowered metoprolol to 12.5 bid     #Anemia  - Hb stable  - continue eliquis    # Stage 4 Sacral Ulcer present on admission   Frequent turn   Wound care daily , on Collagenase   Wound care Consulted     DVT ppx : heparin     code: FULL

## 2022-02-07 NOTE — PROGRESS NOTE ADULT - SUBJECTIVE AND OBJECTIVE BOX
2/5 : Patient is well known to me she was just discharged 2 days ago after a long stay . She does not look post ictal. She does not look septic. She is noted to have clot by her HD cath tip. She is on eliquis. I have discontinued her ABX     2/6 : late last night patients blood cutlures x 1 were positive . I gave her a dose of vanco yesterday. Will repeat blood cultures and get vanco trough today. I have placed official ID consult . She is laying in bed comfortably with no complaints . I had long conversation again with her about life and death and she understands how poor her prognosis is       T(C): 36.9 (02-05-22 @ 07:54), Max: 39.7 (02-04-22 @ 15:15)  HR: 104 (02-05-22 @ 07:54) (93 - 136)  BP: 129/84 (02-05-22 @ 07:54) (68/50 - 129/84)  RR: 16 (02-05-22 @ 07:54) (16 - 22)  SpO2: 99% (02-05-22 @ 07:54) (96% - 100%)  # General NAD   CONSTITUTIONAL: Well groomed, no apparent distress  EYES: PERRLA and symmetric, EOMI, No conjunctival or scleral injection, non-icteric  ENMT: Oral mucosa with moist membranes. No external nasal lesions; nasal mucosa not inflamed; normal dentition; no pharyngeal injection or exudates. Otoscopic exam with normal tympanic membranes; no gross hearing impairment noted.  NECK: Supple, symmetric and without tracheal deviation; thyroid gland not enlarged and without palpable masses.  RESPIRATORY: No respiratory distress, no use of accessory muscles;   CTA b/l, no wheezes, rales or rhonchi, no dullness or hyperresonance to percussion, no tactile fremitus, no subcutaneous emphysema  CARDIOVASCULAR: RRRR, +S1S2, no murmurs, no rubs, no gallops; no JVD; no peripheral edema  GASTROINTESTINAL: Soft, non tender, non distended, no rebound, no guarding; No palpable masses; no hepatosplenomegaly; no hernia palpated;  LYMPHATIC: No cervical LAD or tenderness; no axillary LAD or tenderness; no inguinal LAD or tenderness  MUSCULOSKELETAL: Normal gait and station; no digital clubbing or cyanosis; e  SKIN: No rashes or ulcers noted; no subcutaneous nodules or induration palpable  NEUROLOGIC: CN II-XII intact; normal reflexes in upper and lower extremities, sensation intact in upper and lower extremities b/l to light touch; Babinski down b/l; no Kernig’s sign, no Brudzinski’s sign  PSYCHIATRIC: Appropriate insight/judgment; A+O x 3, mood and affect appropriate, recent/remote memory intact

## 2022-02-07 NOTE — PROGRESS NOTE ADULT - ASSESSMENT
42 yo female w/PMH of ESRD (M/W/F), GERD, bacteremia, seizures, epilepsy, depression, COPD, HTN, heroin abuse, RA, Cdiff colitis in Dec 2021 smoker, recent admission for Aspiration Pna who  presents to [place]  [facility] via ambulance from dialysis center for brief loss of consciousness, questionable seizure vs syncope.   EMS reported she had a 15 sec generalized seizure and was AAOx4 afterwards.  Pt states she was having shivering and not having a seizure.  She reported that her normal seizures involve her  whole body, unlike today.    She c/o  night sweats last night and chills this morning.  Day of admit she had  1.5 hours of hemodialysis instead of her usual HD.   Pt denies cpain/SOB, no cough or resp complaints, no  abd pain.  She reported having a loose stool and vomiting x1  and two episodes day prior to admit. She has urine once a day and denies dysuria. Here noted with MRSA in blood cx. Has perm-catheter in place. Hx of substance abuse.    1. MRSA sepsis/bacteremia. source ? catheter ? sacral decub wound?  hx substance abuse. ESRD  - s/p vancomycin x 1 2/5 level 24 - will re-dose 500mg post HD today  - plan for catheter removal today  - repeat blood cx after catheter removed   - TTE  - wound care eval for sacral decub - wound clean  - monitor temps  - tolerating abx well so far; no side effects noted  - reason for abx use and side effects reviewed with patient  - supportive care  - fu cbc    2. other issues - care per medicine

## 2022-02-07 NOTE — CONSULT NOTE ADULT - SUBJECTIVE AND OBJECTIVE BOX
HPI: Pt is a 41y old Female with hx of ESRD (M/W/F), GERD, bacteremia, seizures, epilepsy, depression, COPD, HTN, heroin abuse, RA, Cdiff colitis in Dec 2021 smoker, recent admission for Aspiration Pna who  presents to [place]  [facility] via ambulance from dialysis center for brief loss of consciousness, questionable seizure vs syncope.   EMS reported she had a 15 sec generalized seizure and was AAOx4 afterwards.  Pt states she was having shivering and not having a seizure.  She reported that her normal seizures involve her  whole body, unlike today.    She c/o  night sweats last night and chills this morning.  Today she had  1.5 hours of hemodialysis instead of her usual HD.   Pt denies cpain/SOB, no cough or resp complaints, no  abd pain.  She reported having a loose stool and vomiting x1 today and two episodes yesterday.  She has urine once a day and denies dysuria. Palliative medicine consulted to help establish goc and advance care planning    2022 patient seen and examined, denying pain at this time states at times has pain in her shoulder, but ok at this time. Patient agreeable to arranging a GOC meeting, states her brother will be coming up this afternoon.         PAIN: (x )Yes   ( )No  Level: moderate   Location: shoulder   Intensity:  3-5  /10  Quality: sore   Aggravating Factors: movement   Alleviating Factors: medication and heat pack   Radiation: arm   Duration/Timing: intermittent   Impact on ADLs: using arm     DYSPNEA: ( ) Yes  (x ) No  Level:    PAST MEDICAL & SURGICAL HISTORY:  Rheumatoid arthritis  H/O Raynaud&#x27;s syndrome  Heroin abuse  Smoker  Sepsis  HTN (hypertension)  COPD (chronic obstructive pulmonary disease)  Depression  Epilepsy  GERD (gastroesophageal reflux disease)  Bacteremia  Systemic lupus erythematosus  Aphonia  H/O tubal ligation  H/O appendicitis  Gall bladder stones  H/O tracheostomy  S/P percutaneous endoscopic gastrostomy (PEG) tube placement    SOCIAL HX:    Hx opiate tolerance (x )YES  ( )NO    Baseline ADLs  (Prior to Admission)  ( ) Independent   ( x)Dependent    FAMILY HISTORY:  Family history of cardiomyopathy (Mother)    Review of Systems:    Anxiety- at times   Depression- at times   Physical Discomfort- at times   Dyspnea- no   Constipation- no   Diarrhea- at times   Nausea- no   Vomiting- no   Anorexia- decreased appetite   Weight Loss-  unsure   Cough- no   Secretions- no   Fatigue- yes   Weakness- yes   Delirium- no     All other systems reviewed and negative    PHYSICAL EXAM:    Vital Signs Last 24 Hrs  T(C): 36.7 (2022 09:00), Max: 36.8 (2022 19:44)  T(F): 98 (2022 09:00), Max: 98.3 (2022 19:44)  HR: 102 (2022 09:00) (70 - 102)  BP: 136/90 (2022 09:00) (115/74 - 136/90)  BP(mean): 98 (2022 12:53) (98 - 98)  RR: 18 (2022 09:00) (18 - 18)  SpO2: 97% (2022 09:00) (97% - 100%)    Daily Weight in k.3 (2022 06:17)    PPSV2: 30  %    General: ill disheveled appearing female in  bed, NAD   Mental Status: alert oriented  HEENT:   Lungs:  Cardiac:  GI:  :  Ext:  Neuro:      LABS:                        8.2    18.60 )-----------( 76       ( 2022 07:26 )             26.4     02-07    130<L>  |  99  |  18  ----------------------------<  84  3.1<L>   |  20<L>  |  4.88<H>    Ca    8.5      2022 07:49    Albumin: Albumin, Serum: 2.4 g/dL ( @ 14:06)    Allergies    morphine (Rash)    Intolerances    MEDICATIONS  (STANDING):  apixaban 2.5 milliGRAM(s) Oral two times a day  artificial tears (preservative free) Ophthalmic Solution 1 Drop(s) Both EYES two times a day  atovaquone  Suspension 1500 milliGRAM(s) Oral daily  collagenase Ointment 1 Application(s) Topical daily  epoetin amee-epbx (RETACRIT) Injectable 90289 Unit(s) IV Push <User Schedule>  gabapentin 100 milliGRAM(s) Oral three times a day  hydrALAZINE 25 milliGRAM(s) Oral three times a day  hydroxychloroquine 200 milliGRAM(s) Oral daily  levETIRAcetam 250 milliGRAM(s) Oral <User Schedule>  levETIRAcetam 500 milliGRAM(s) Oral daily  metoprolol tartrate 12.5 milliGRAM(s) Oral two times a day  pantoprazole    Tablet 40 milliGRAM(s) Oral before breakfast  sevelamer carbonate 800 milliGRAM(s) Oral three times a day with meals  silver sulfADIAZINE 1% Cream 1 Application(s) Topical two times a day    MEDICATIONS  (PRN):  acetaminophen     Tablet .. 650 milliGRAM(s) Oral every 6 hours PRN Temp greater or equal to 38.5C (101.3F), Mild Pain (1 - 3)  albumin human 25% IVPB 50 milliLiter(s) IV Intermittent every 1 hour PRN SBP less than 100  ALBUTerol    90 MICROgram(s) HFA Inhaler 2 Puff(s) Inhalation every 6 hours PRN Shortness of Breath and/or Wheezing  loperamide 2 milliGRAM(s) Oral every 6 hours PRN Diarrhea  melatonin 3 milliGRAM(s) Oral at bedtime PRN Insomnia  senna 2 Tablet(s) Oral at bedtime PRN Constipation      RADIOLOGY/ADDITIONAL STUDIES:    ACC: [id number] EXAM:  CT ABDOMEN AND PELVIS IC                          *** ADDENDUM***  Questionable mild hyperenhancement of the urethra. Correlate with   urinalysis.  Findings were discussed with Dr. Epperson at 4:37 pm on 2022.  *** END OF ADDENDUM***  PROCEDURE DATE:  2022    INTERPRETATION:  CLINICAL INFORMATION: Infected decubitus ulcer  COMPARISON: 2022, 2022.  CONTRAST/COMPLICATIONS:  IV Contrast: Omnipaque 350  90 cc administered   10 cc discarded  Oral Contrast: NONE  Complications: None reported at time of study completion  PROCEDURE:  CT of the Abdomen and Pelvis was performed.  Sagittal and coronal reformats were performed.  FINDINGS:  LOWER CHEST: Within normal limits.  LIVER: There is thrombus surrounding the catheter tip in the intrahepatic   IVC.  BILE DUCTS: Normal caliber.  GALLBLADDER: Cholecystectomy.  SPLEEN: Borderline enlarged.  PANCREAS: Within normal limits.  ADRENALS: Within normal limits.  KIDNEYS/URETERS: Within normal limits.  BLADDER: Within normal limits. Question mild hyperenhancement of the   urethra.  REPRODUCTIVE ORGANS: Uterus and adnexa within normal limits.  BOWEL: No bowel obstruction. Large amount of fluid/stool throughout the   colon. Question mild thickening versus underdistention of the sigmoid   colon. Appendix is not visualized. No evidence of inflammation in the   pericecal region.  PERITONEUM: No ascites.  VESSELS: Within the intrahepatic IVC, as described above.  RETROPERITONEUM/LYMPH NODES: Multiple enlarged retroperitoneal nodes. . A   left para-aortic node measures 1.9 x 1.4 cm (2:54). A left common iliac   node measures 2.4 x 1.3 cm (2:75)  ABDOMINAL WALL: Left sacral decubitus ulcer without definite evidence of   osseous erosion.  BONES: Degenerative changes. Degenerative changes of the right hip.    IMPRESSION:  There is thrombus surrounding the catheter tip in the intrahepatic IVC.  Retroperitoneal adenopathy, as described above. Differential includes   lymphoma.  Mildly distended, fluid-filled colon. Mild thickening versus   underdistention of the sigmoid colon.  Left sacral decubitus ulcer without definite osseous erosion. Correlate   with MRI if clinically warranted.  Additional findings as above.  ***Please see the addendum at the top of this report. It may contain   additional important information or changes.****      ACC: [id number] EXAM:  XR CHEST PORTABLE URGENT 1V                        PROCEDURE DATE:  2022    INTERPRETATION:  Sepsis.  AP chest. Prior 2022.  Endotracheal tube nasogastric tube and right internal jugular line have   been removed. Right dialysis catheter reidentified in position. No change   heart mediastinum. No consolidation pneumothorax or effusion.    IMPRESSION: No active infiltrates       HPI: Pt is a 41y old Female with hx of ESRD (M/W/F), GERD, bacteremia, seizures, epilepsy, depression, COPD, HTN, heroin abuse, RA, Cdiff colitis in Dec 2021 smoker, recent admission for Aspiration Pna who  presents to [place]  [facility] via ambulance from dialysis center for brief loss of consciousness, questionable seizure vs syncope.   EMS reported she had a 15 sec generalized seizure and was AAOx4 afterwards.  Pt states she was having shivering and not having a seizure.  She reported that her normal seizures involve her  whole body, unlike today.    She c/o  night sweats last night and chills this morning.  Today she had  1.5 hours of hemodialysis instead of her usual HD.   Pt denies cpain/SOB, no cough or resp complaints, no  abd pain.  She reported having a loose stool and vomiting x1 today and two episodes yesterday.  She has urine once a day and denies dysuria. Palliative medicine consulted to help establish goc and advance care planning    2022 patient seen and examined, denying pain at this time states at times has pain in her shoulder, but ok at this time. Patient agreeable to arranging a GOC meeting, states her brother will be coming up this afternoon.         PAIN: (x )Yes   ( )No  Level: moderate   Location: shoulder   Intensity:  3-5  /10  Quality: sore   Aggravating Factors: movement   Alleviating Factors: medication and heat pack   Radiation: arm   Duration/Timing: intermittent   Impact on ADLs: using arm     DYSPNEA: ( ) Yes  (x ) No  Level:    PAST MEDICAL & SURGICAL HISTORY:  Rheumatoid arthritis  H/O Raynaud&#x27;s syndrome  Heroin abuse  Smoker  Sepsis  HTN (hypertension)  COPD (chronic obstructive pulmonary disease)  Depression  Epilepsy  GERD (gastroesophageal reflux disease)  Bacteremia  Systemic lupus erythematosus  Aphonia  H/O tubal ligation  H/O appendicitis  Gall bladder stones  H/O tracheostomy  S/P percutaneous endoscopic gastrostomy (PEG) tube placement    SOCIAL HX:    Hx opiate tolerance (x )YES  ( )NO    Baseline ADLs  (Prior to Admission)  ( ) Independent   ( x)Dependent    FAMILY HISTORY:  Family history of cardiomyopathy (Mother)    Review of Systems:    Anxiety- at times   Depression- at times   Physical Discomfort- at times   Dyspnea- no   Constipation- no   Diarrhea- at times   Nausea- no   Vomiting- no   Anorexia- decreased appetite   Weight Loss-  unsure   Cough- no   Secretions- no   Fatigue- yes   Weakness- yes   Delirium- no     All other systems reviewed and negative    PHYSICAL EXAM:    Vital Signs Last 24 Hrs  T(C): 36.7 (2022 09:00), Max: 36.8 (2022 19:44)  T(F): 98 (2022 09:00), Max: 98.3 (2022 19:44)  HR: 102 (2022 09:00) (70 - 102)  BP: 136/90 (2022 09:00) (115/74 - 136/90)  BP(mean): 98 (2022 12:53) (98 - 98)  RR: 18 (2022 09:00) (18 - 18)  SpO2: 97% (2022 09:00) (97% - 100%)    Daily Weight in k.3 (2022 06:17)    PPSV2: 30  %    General: ill disheveled appearing female in  bed, NAD   Mental Status: alert oriented  HEENT: mmm   Lungs: clear b/l   Cardiac: s1s2 +   GI: non tender, non distended +BS   : no suprapubic tenderness   Ext: no edema multiple areas of scarring   Neuro: weakness     LABS:                        8.2    18.60 )-----------( 76       ( 2022 07:26 )             26.4     02-07    130<L>  |  99  |  18  ----------------------------<  84  3.1<L>   |  20<L>  |  4.88<H>    Ca    8.5      2022 07:49    Albumin: Albumin, Serum: 2.4 g/dL (-04 @ 14:06)    Allergies    morphine (Rash)    Intolerances    MEDICATIONS  (STANDING):  apixaban 2.5 milliGRAM(s) Oral two times a day  artificial tears (preservative free) Ophthalmic Solution 1 Drop(s) Both EYES two times a day  atovaquone  Suspension 1500 milliGRAM(s) Oral daily  collagenase Ointment 1 Application(s) Topical daily  epoetin amee-epbx (RETACRIT) Injectable 82213 Unit(s) IV Push <User Schedule>  gabapentin 100 milliGRAM(s) Oral three times a day  hydrALAZINE 25 milliGRAM(s) Oral three times a day  hydroxychloroquine 200 milliGRAM(s) Oral daily  levETIRAcetam 250 milliGRAM(s) Oral <User Schedule>  levETIRAcetam 500 milliGRAM(s) Oral daily  metoprolol tartrate 12.5 milliGRAM(s) Oral two times a day  pantoprazole    Tablet 40 milliGRAM(s) Oral before breakfast  sevelamer carbonate 800 milliGRAM(s) Oral three times a day with meals  silver sulfADIAZINE 1% Cream 1 Application(s) Topical two times a day    MEDICATIONS  (PRN):  acetaminophen     Tablet .. 650 milliGRAM(s) Oral every 6 hours PRN Temp greater or equal to 38.5C (101.3F), Mild Pain (1 - 3)  albumin human 25% IVPB 50 milliLiter(s) IV Intermittent every 1 hour PRN SBP less than 100  ALBUTerol    90 MICROgram(s) HFA Inhaler 2 Puff(s) Inhalation every 6 hours PRN Shortness of Breath and/or Wheezing  loperamide 2 milliGRAM(s) Oral every 6 hours PRN Diarrhea  melatonin 3 milliGRAM(s) Oral at bedtime PRN Insomnia  senna 2 Tablet(s) Oral at bedtime PRN Constipation      RADIOLOGY/ADDITIONAL STUDIES:    ACC: [id number] EXAM:  CT ABDOMEN AND PELVIS IC                          *** ADDENDUM***  Questionable mild hyperenhancement of the urethra. Correlate with   urinalysis.  Findings were discussed with Dr. Epperson at 4:37 pm on 2022.  *** END OF ADDENDUM***  PROCEDURE DATE:  2022    INTERPRETATION:  CLINICAL INFORMATION: Infected decubitus ulcer  COMPARISON: 2022, 2022.  CONTRAST/COMPLICATIONS:  IV Contrast: Omnipaque 350  90 cc administered   10 cc discarded  Oral Contrast: NONE  Complications: None reported at time of study completion  PROCEDURE:  CT of the Abdomen and Pelvis was performed.  Sagittal and coronal reformats were performed.  FINDINGS:  LOWER CHEST: Within normal limits.  LIVER: There is thrombus surrounding the catheter tip in the intrahepatic   IVC.  BILE DUCTS: Normal caliber.  GALLBLADDER: Cholecystectomy.  SPLEEN: Borderline enlarged.  PANCREAS: Within normal limits.  ADRENALS: Within normal limits.  KIDNEYS/URETERS: Within normal limits.  BLADDER: Within normal limits. Question mild hyperenhancement of the   urethra.  REPRODUCTIVE ORGANS: Uterus and adnexa within normal limits.  BOWEL: No bowel obstruction. Large amount of fluid/stool throughout the   colon. Question mild thickening versus underdistention of the sigmoid   colon. Appendix is not visualized. No evidence of inflammation in the   pericecal region.  PERITONEUM: No ascites.  VESSELS: Within the intrahepatic IVC, as described above.  RETROPERITONEUM/LYMPH NODES: Multiple enlarged retroperitoneal nodes. . A   left para-aortic node measures 1.9 x 1.4 cm (2:54). A left common iliac   node measures 2.4 x 1.3 cm (2:75)  ABDOMINAL WALL: Left sacral decubitus ulcer without definite evidence of   osseous erosion.  BONES: Degenerative changes. Degenerative changes of the right hip.    IMPRESSION:  There is thrombus surrounding the catheter tip in the intrahepatic IVC.  Retroperitoneal adenopathy, as described above. Differential includes   lymphoma.  Mildly distended, fluid-filled colon. Mild thickening versus   underdistention of the sigmoid colon.  Left sacral decubitus ulcer without definite osseous erosion. Correlate   with MRI if clinically warranted.  Additional findings as above.  ***Please see the addendum at the top of this report. It may contain   additional important information or changes.****      ACC: [id number] EXAM:  XR CHEST PORTABLE URGENT 1V                        PROCEDURE DATE:  2022    INTERPRETATION:  Sepsis.  AP chest. Prior 2022.  Endotracheal tube nasogastric tube and right internal jugular line have   been removed. Right dialysis catheter reidentified in position. No change   heart mediastinum. No consolidation pneumothorax or effusion.    IMPRESSION: No active infiltrates

## 2022-02-07 NOTE — PROGRESS NOTE ADULT - SUBJECTIVE AND OBJECTIVE BOX
Patient is a 41y Female who reports no complaint as new   seen on HD earlier today    MRSA + Blood Cx    IJ catheter w poor blood flow            MEDICATIONS  (STANDING):  apixaban 2.5 milliGRAM(s) Oral two times a day  artificial tears (preservative free) Ophthalmic Solution 1 Drop(s) Both EYES two times a day  atovaquone  Suspension 1500 milliGRAM(s) Oral daily  collagenase Ointment 1 Application(s) Topical daily  epoetin amee-epbx (RETACRIT) Injectable 24673 Unit(s) IV Push <User Schedule>  gabapentin 100 milliGRAM(s) Oral three times a day  hydroxychloroquine 200 milliGRAM(s) Oral daily  levETIRAcetam 250 milliGRAM(s) Oral <User Schedule>  levETIRAcetam 500 milliGRAM(s) Oral daily  metoprolol tartrate 12.5 milliGRAM(s) Oral two times a day  pantoprazole    Tablet 40 milliGRAM(s) Oral before breakfast  sevelamer carbonate 800 milliGRAM(s) Oral three times a day with meals  silver sulfADIAZINE 1% Cream 1 Application(s) Topical two times a day    Vital Signs Last 24 Hrs  T(C): 36.9 (07 Feb 2022 19:46), Max: 37.5 (07 Feb 2022 16:46)  T(F): 98.5 (07 Feb 2022 19:46), Max: 99.5 (07 Feb 2022 16:46)  HR: 108 (07 Feb 2022 19:46) (89 - 110)  BP: 126/87 (07 Feb 2022 19:46) (126/83 - 139/88)  BP(mean): --  RR: 18 (07 Feb 2022 19:46) (16 - 18)  SpO2: 100% (07 Feb 2022 19:46) (97% - 100%)    PHYSICAL EXAM:    Constitutional: frail, >stated age  dry  Respiratory: ronchi  Cardiovascular: S1 and S   chronic changes  Neurological: alert  catheter         LABS:                      130    |  99     |  18     ----------------------------<  84        07 Feb 2022 07:49  3.1     |  20     |  4.88     129    |  99     |  11     ----------------------------<  81        06 Feb 2022 07:26  3.2     |  20     |  3.63     125    |  93     |  31     ----------------------------<  65        05 Feb 2022 06:54  5.0     |  19     |  6.29     Ca    8.5        07 Feb 2022 07:49  Ca    8.3        06 Feb 2022 07:26        TPro  7.1    /  Alb  2.4    /  TBili  0.4    /        04 Feb 2022 14:06  DBili  x      /  AST  79     /  ALT  22     /  AlkPhos  161                              7.5    10.77 )-----------( 174      ( 07 Feb 2022 11:57 )             23.4                         8.2    18.60 )-----------( 76       ( 06 Feb 2022 07:26 )             26.4           Urine Studies:          RADIOLOGY & ADDITIONAL STUDIES:

## 2022-02-07 NOTE — PROGRESS NOTE ADULT - SUBJECTIVE AND OBJECTIVE BOX
Date of service: 02-07-22 @ 11:04    pt seen and examined  feels better  sitting up in bed, nad  afebrile     ROS: no fever or chills; denies dizziness, no HA, no SOB or cough, no abdominal pain, no diarrhea or constipation; no dysuria, no urinary frequency, no legs pain, no rashes    MEDICATIONS  (STANDING):  apixaban 2.5 milliGRAM(s) Oral two times a day  artificial tears (preservative free) Ophthalmic Solution 1 Drop(s) Both EYES two times a day  atovaquone  Suspension 1500 milliGRAM(s) Oral daily  collagenase Ointment 1 Application(s) Topical daily  epoetin amee-epbx (RETACRIT) Injectable 97670 Unit(s) IV Push <User Schedule>  gabapentin 100 milliGRAM(s) Oral three times a day  hydroxychloroquine 200 milliGRAM(s) Oral daily  levETIRAcetam 250 milliGRAM(s) Oral <User Schedule>  levETIRAcetam 500 milliGRAM(s) Oral daily  metoprolol tartrate 12.5 milliGRAM(s) Oral two times a day  pantoprazole    Tablet 40 milliGRAM(s) Oral before breakfast  sevelamer carbonate 800 milliGRAM(s) Oral three times a day with meals  silver sulfADIAZINE 1% Cream 1 Application(s) Topical two times a day      Vital Signs Last 24 Hrs  T(C): 36.7 (07 Feb 2022 09:00), Max: 36.8 (06 Feb 2022 19:44)  T(F): 98 (07 Feb 2022 09:00), Max: 98.3 (06 Feb 2022 19:44)  HR: 102 (07 Feb 2022 09:00) (102 - 102)  BP: 136/90 (07 Feb 2022 09:00) (115/74 - 136/90)  BP(mean): 98 (06 Feb 2022 12:53) (98 - 98)  RR: 18 (07 Feb 2022 09:00) (18 - 18)  SpO2: 97% (07 Feb 2022 09:00) (97% - 100%)      PE:  Constitutional: frail looking  HEENT: NC/AT, EOMI, PERRLA, conjunctivae clear; ears and nose atraumatic; pharynx benign  Neck: supple; thyroid not palpable  Back: no tenderness  Respiratory: respiratory effort normal; clear to auscultation  Cardiovascular: S1S2 regular, no murmurs  Abdomen: soft, not tender, not distended, positive BS; liver and spleen WNL  Genitourinary: no suprapubic tenderness  Lymphatic: no LN palpable  Musculoskeletal: no muscle tenderness, no joint swelling or tenderness  Extremities: no pedal edema  Neurological/ Psychiatric: AxOx3, Judgement and insight normal;  moving all extremities  Skin: no rashes; no palpable lesions perm cath in place, stage IV sacral ulcer     Labs: all available labs reviewed                                   8.2    18.60 )-----------( 76       ( 06 Feb 2022 07:26 )             26.4     02-07    130<L>  |  99  |  18  ----------------------------<  84  3.1<L>   |  20<L>  |  4.88<H>    Ca    8.5      07 Feb 2022 07:49         Vancomycin Level, Trough: 24.8 ug/mL (02-07 @ 07:49)  Vancomycin Level, Trough: 27.5 ug/mL (02-06 @ 11:51)      Culture - Blood (02.04.22 @ 14:06)   - Methicillin resistant Staphylococcus aureus (MRSA): Detec   Gram Stain:   Growth in aerobic bottle: Gram Positive Cocci in Clusters   Specimen Source: .Blood Blood-Peripheral   Organism: Blood Culture PCR   Culture Results:   Growth in aerobic bottle: Gram Positive Cocci in Clusters   ***Blood Panel PCR results on this specimen are available   approximately 3 hours after the Gram stain result.***   Gram stain, PCR, and/or culture results may not always   correspond due to difference in methodologies.       Radiology: all available radiological tests reviewed    *** ADDENDUM***    Questionable mild hyperenhancement of the urethra. Correlate with   urinalysis.    Findings were discussed with Dr. Epperson at 4:37 pm on 2/4/2022.    --- End of Report ---    *** END OF ADDENDUM***      PROCEDURE DATE:  02/04/2022          INTERPRETATION:  CLINICAL INFORMATION: Infected decubitus ulcer    COMPARISON: 1/21/2022, 1/18/2022.    CONTRAST/COMPLICATIONS:  IV Contrast: Omnipaque 350  90 cc administered   10 cc discarded  Oral Contrast: NONE  Complications: None reported at time of study completion    PROCEDURE:  CT of the Abdomen and Pelvis was performed.  Sagittal and coronal reformats were performed.    FINDINGS:  LOWER CHEST: Within normal limits.    LIVER: There is thrombus surrounding the catheter tip in the intrahepatic   IVC.  BILE DUCTS: Normal caliber.  GALLBLADDER: Cholecystectomy.  SPLEEN: Borderline enlarged.  PANCREAS: Within normal limits.  ADRENALS: Within normal limits.  KIDNEYS/URETERS: Within normal limits.    BLADDER: Within normal limits. Question mild hyperenhancement of the   urethra.  REPRODUCTIVE ORGANS: Uterus and adnexa within normal limits.    BOWEL: No bowel obstruction. Large amount of fluid/stool throughout the   colon. Question mild thickening versus underdistention of the sigmoid   colon. Appendix is not visualized. No evidence of inflammation in the   pericecal region.  PERITONEUM: No ascites.  VESSELS: Within the intrahepatic IVC, as described above.  RETROPERITONEUM/LYMPH NODES: Multiple enlarged retroperitoneal nodes. . A   left para-aortic node measures 1.9 x 1.4 cm (2:54). A left common iliac   node measures 2.4 x 1.3 cm (2:75)  ABDOMINAL WALL: Left sacral decubitus ulcer without definite evidence of   osseous erosion.  BONES: Degenerative changes. Degenerative changes of the right hip.    IMPRESSION:  There is thrombus surrounding the catheter tip in the intrahepatic IVC.    Retroperitoneal adenopathy, as described above. Differential includes   lymphoma.    Mildly distended, fluid-filled colon. Mild thickening versus   underdistention of the sigmoid colon.    Left sacral decubitus ulcer without definite osseous erosion. Correlate   with MRI if clinically warranted.    Additional findings as above.      ***Please see the addendum at the top of this report. It may contain   additional important information or changes.****      < end of copied text >    Advanced directives addressed: full resuscitation

## 2022-02-07 NOTE — CONSULT NOTE ADULT - SUBJECTIVE AND OBJECTIVE BOX
Pt is a 40 yo F with an extensive PMHx was admitted from HD for fevers and chills, found to have MRSA bacteremia (blood cultures from 2/4). Pt has been undergoing HD for the past 4 months via [facility]. PC was removed by IR today. Pt states she is left handed and her right arm has been saved for AVF creation. Otherwise, no complaints at this time.    PAST MEDICAL & SURGICAL HISTORY:  Rheumatoid arthritis    H/O Raynaud&#x27;s syndrome    Heroin abuse    Smoker    Sepsis    HTN (hypertension)    COPD (chronic obstructive pulmonary disease)    Depression    Epilepsy    GERD (gastroesophageal reflux disease)    Bacteremia    Systemic lupus erythematosus    Aphonia    H/O tubal ligation    H/O appendicitis    Gall bladder stones    H/O tracheostomy    S/P percutaneous endoscopic gastrostomy (PEG) tube placement    Vital Signs Last 24 Hrs  T(C): 37.5 (07 Feb 2022 16:46), Max: 37.5 (07 Feb 2022 16:46)  T(F): 99.5 (07 Feb 2022 16:46), Max: 99.5 (07 Feb 2022 16:46)  HR: 110 (07 Feb 2022 16:46) (89 - 110)  BP: 136/82 (07 Feb 2022 16:46) (115/74 - 139/88)  BP(mean): --  RR: 18 (07 Feb 2022 16:46) (16 - 18)  SpO2: 99% (07 Feb 2022 16:46) (97% - 99%)    Physical Exam:  General: AAOx3, in NAD  HEENT: NC/AT, EOMI, poor dentition  Cardio: S1S2, tachycardic  Pulm: equal air entry b/l, non labored  Chest: dressing over right chest c/d/i  Abdomen: soft, NT/ND  Extremities: multiple excoriations over all extremities    Labs:                        7.5    10.77 )-----------( 174      ( 07 Feb 2022 11:57 )             23.4   02-07    130<L>  |  99  |  18  ----------------------------<  84  3.1<L>   |  20<L>  |  4.88<H>    Ca    8.5      07 Feb 2022 07:49    Imaging:  < from: US Duplex Venous Upper Ext Complete, Bilateral (02.07.22 @ 16:24) >  ACC: [id number] EXAM:  US DPLX UPR EXT VEINS COMPL BI                          PROCEDURE DATE:  02/07/2022      < end of copied text >  < from: US Duplex Venous Upper Ext Complete, Bilateral (02.07.22 @ 16:24) >  Right cephalic vein measurements (centimeters):  Mid upper arm: 0.1  Remainder of the vessel not well-visualized.    Right basilic vein measurements (centimeters):  Axillary junction: 0.2  Upper arm (proximal, mid, distal): 0.2, 0.2, 0.3  Antecubital fossa: 0.2  Forearm (proximal, mid, distal): 0.2, 0.1, 0.1  Wrist: Not visualized    Left cephalic vein measurements (centimeters):  Subclavian junction: 0.2  Upper arm (proximal, mid, distal): 0.1, 0.1, not visualized  Antecubital fossa: Not visualized  Forearm (proximal, mid, distal): Not visualized, 0.1, 0.1  Wrist: Not visualized    Left basilic vein measurements (centimeters):  Axillary junction: 0.2  Upper arm (proximal, mid, distal): 0.1, 0.3, 0.4  Antecubital fossa: Not visualized  Forearm: Not visualized  Wrist: Not visualized    < end of copied text >  < from: CT Abdomen and Pelvis w/ IV Cont (02.04.22 @ 15:56) >    ACC: [id number] EXAM:  CT ABDOMEN AND PELVIS IC         < end of copied text >  < from: CT Abdomen and Pelvis w/ IV Cont (02.04.22 @ 15:56) >    IMPRESSION:  There is thrombus surrounding the catheter tip in the intrahepatic IVC.    Retroperitoneal adenopathy, as described above. Differential includes   lymphoma.    Mildly distended, fluid-filled colon. Mild thickening versus   underdistention of the sigmoid colon.    Left sacral decubitus ulcer without definite osseous erosion. Correlate   with MRI if clinically warranted.    < end of copied text >

## 2022-02-07 NOTE — PROGRESS NOTE ADULT - ASSESSMENT
40 yo female w/PMH of ESRD (M/W/F), GERD, bacteremia, seizures, epilepsy, depression, COPD, HTN, heroin abuse, RA, Cdiff colitis in Dec 2021 smoker, recent admission for Aspiration Pna who  presents to [facility]  [facility] via ambulance from dialysis center for brief loss of consciousness, questionable seizure vs syncope.        NATALIA now ESRD on HD   HD today as permcath to be removed after HD per IR   MRSA bacteremia   recurrent - will need AVF or AVG creationn t during  is admission tto lessen recurrent bactermia risk  - pt agreeable, d/w Dr Lynch   line holiday = will need to reinsert permacath or temp HD catheter as per ID reccs ]   vein mapping        -K protocol - 4 K today   -UF as tolerated    Hyponatremia  -UF with HD, , fluid removal   - limit free water    HTN  -0ral meds    Anemia - TAMARA at HD       Lyphadenopathy    - heme onc involved for workup     d/w HD staff   d/w Dr Lynch earlier     * pt see on HD earlier today  40 yo female w/PMH of ESRD (M/W/F), GERD, bacteremia, seizures, epilepsy, depression, COPD, HTN, heroin abuse, RA, Cdiff colitis in Dec 2021 smoker, recent admission for Aspiration Pna who  presents to [facility] via ambulance from dialysis center for brief loss of consciousness, questionable seizure vs syncope.        NATALIA now ESRD on HD   HD today as permcath to be removed after HD per IR   MRSA bacteremia   recurrent - will need AVF or AVG creationt during this admission to lessen recurrent bactermia risk  - pt agreeable, d/w Dr Lynch   line holiday = will need to reinsert permacath or temp HD catheter as per ID reccs later this week    vein mapping        -K protocol - 4 K today   -UF as tolerated    Hyponatremia  -UF with HD, , fluid removal   - limit free water    HTN  -oral meds    Anemia - TAMAAR at HD       Lymphadenopathy    - heme onc involved for workup     d/w HD staff   d/w Dr Lynch earlier     * pt see on HD earlier today

## 2022-02-07 NOTE — CONSULT NOTE ADULT - ATTENDING COMMENTS
pt seen and examined  she's in no acute distress receiving dialysis   she denied pain and nausea when I saw her  ct abdomen showed retroperitoneal adenopathy awaiting possible IR bio spy   lungs ctabl  cards no mgr  abdomen soft nontender

## 2022-02-08 DIAGNOSIS — Z86.19 PERSONAL HISTORY OF OTHER INFECTIOUS AND PARASITIC DISEASES: ICD-10-CM

## 2022-02-08 DIAGNOSIS — Z87.01 PERSONAL HISTORY OF PNEUMONIA (RECURRENT): ICD-10-CM

## 2022-02-08 DIAGNOSIS — Z86.79 PERSONAL HISTORY OF OTHER DISEASES OF THE CIRCULATORY SYSTEM: ICD-10-CM

## 2022-02-08 DIAGNOSIS — Z87.09 PERSONAL HISTORY OF OTHER DISEASES OF THE RESPIRATORY SYSTEM: ICD-10-CM

## 2022-02-08 DIAGNOSIS — G40.909 EPILEPSY, UNSPECIFIED, NOT INTRACTABLE, W/OUT STATUS EPILEPTICUS: ICD-10-CM

## 2022-02-08 DIAGNOSIS — Z86.59 PERSONAL HISTORY OF OTHER MENTAL AND BEHAVIORAL DISORDERS: ICD-10-CM

## 2022-02-08 DIAGNOSIS — N18.6 END STAGE RENAL DISEASE: ICD-10-CM

## 2022-02-08 DIAGNOSIS — M32.9 SYSTEMIC LUPUS ERYTHEMATOSUS, UNSPECIFIED: ICD-10-CM

## 2022-02-08 DIAGNOSIS — Z87.39 PERSONAL HISTORY OF OTHER DISEASES OF THE MUSCULOSKELETAL SYSTEM AND CONNECTIVE TISSUE: ICD-10-CM

## 2022-02-08 DIAGNOSIS — B95.62 BACTEREMIA: ICD-10-CM

## 2022-02-08 DIAGNOSIS — Z87.19 PERSONAL HISTORY OF OTHER DISEASES OF THE DIGESTIVE SYSTEM: ICD-10-CM

## 2022-02-08 DIAGNOSIS — F11.11 OPIOID ABUSE, IN REMISSION: ICD-10-CM

## 2022-02-08 DIAGNOSIS — I42.9 CARDIOMYOPATHY, UNSPECIFIED: ICD-10-CM

## 2022-02-08 DIAGNOSIS — Z99.2 END STAGE RENAL DISEASE: ICD-10-CM

## 2022-02-08 DIAGNOSIS — F14.11 COCAINE ABUSE, IN REMISSION: ICD-10-CM

## 2022-02-08 DIAGNOSIS — R78.81 BACTEREMIA: ICD-10-CM

## 2022-02-08 LAB
ALBUMIN SERPL ELPH-MCNC: 1.9 G/DL — LOW (ref 3.3–5)
ALP SERPL-CCNC: 81 U/L — SIGNIFICANT CHANGE UP (ref 40–120)
ALT FLD-CCNC: 12 U/L — SIGNIFICANT CHANGE UP (ref 12–78)
ANION GAP SERPL CALC-SCNC: 12 MMOL/L — SIGNIFICANT CHANGE UP (ref 5–17)
APTT BLD: 31.6 SEC — SIGNIFICANT CHANGE UP (ref 27.5–35.5)
AST SERPL-CCNC: 12 U/L — LOW (ref 15–37)
BASOPHILS # BLD AUTO: 0.05 K/UL — SIGNIFICANT CHANGE UP (ref 0–0.2)
BASOPHILS NFR BLD AUTO: 0.8 % — SIGNIFICANT CHANGE UP (ref 0–2)
BILIRUB SERPL-MCNC: 0.2 MG/DL — SIGNIFICANT CHANGE UP (ref 0.2–1.2)
BUN SERPL-MCNC: 11 MG/DL — SIGNIFICANT CHANGE UP (ref 7–23)
CALCIUM SERPL-MCNC: 8.3 MG/DL — LOW (ref 8.5–10.1)
CHLORIDE SERPL-SCNC: 101 MMOL/L — SIGNIFICANT CHANGE UP (ref 96–108)
CO2 SERPL-SCNC: 22 MMOL/L — SIGNIFICANT CHANGE UP (ref 22–31)
CREAT SERPL-MCNC: 3.86 MG/DL — HIGH (ref 0.5–1.3)
EOSINOPHIL # BLD AUTO: 0.11 K/UL — SIGNIFICANT CHANGE UP (ref 0–0.5)
EOSINOPHIL NFR BLD AUTO: 1.8 % — SIGNIFICANT CHANGE UP (ref 0–6)
GLUCOSE SERPL-MCNC: 89 MG/DL — SIGNIFICANT CHANGE UP (ref 70–99)
HCT VFR BLD CALC: 23.8 % — LOW (ref 34.5–45)
HGB BLD-MCNC: 7.7 G/DL — LOW (ref 11.5–15.5)
IMM GRANULOCYTES NFR BLD AUTO: 1.9 % — HIGH (ref 0–1.5)
INR BLD: 1.18 RATIO — HIGH (ref 0.88–1.16)
LYMPHOCYTES # BLD AUTO: 1.07 K/UL — SIGNIFICANT CHANGE UP (ref 1–3.3)
LYMPHOCYTES # BLD AUTO: 17.3 % — SIGNIFICANT CHANGE UP (ref 13–44)
MCHC RBC-ENTMCNC: 28.6 PG — SIGNIFICANT CHANGE UP (ref 27–34)
MCHC RBC-ENTMCNC: 32.4 GM/DL — SIGNIFICANT CHANGE UP (ref 32–36)
MCV RBC AUTO: 88.5 FL — SIGNIFICANT CHANGE UP (ref 80–100)
MONOCYTES # BLD AUTO: 0.59 K/UL — SIGNIFICANT CHANGE UP (ref 0–0.9)
MONOCYTES NFR BLD AUTO: 9.5 % — SIGNIFICANT CHANGE UP (ref 2–14)
NEUTROPHILS # BLD AUTO: 4.24 K/UL — SIGNIFICANT CHANGE UP (ref 1.8–7.4)
NEUTROPHILS NFR BLD AUTO: 68.7 % — SIGNIFICANT CHANGE UP (ref 43–77)
PLATELET # BLD AUTO: 185 K/UL — SIGNIFICANT CHANGE UP (ref 150–400)
POTASSIUM SERPL-MCNC: 3.1 MMOL/L — LOW (ref 3.5–5.3)
POTASSIUM SERPL-SCNC: 3.1 MMOL/L — LOW (ref 3.5–5.3)
PROT SERPL-MCNC: 6.1 GM/DL — SIGNIFICANT CHANGE UP (ref 6–8.3)
PROTHROM AB SERPL-ACNC: 13.7 SEC — HIGH (ref 10.6–13.6)
RBC # BLD: 2.69 M/UL — LOW (ref 3.8–5.2)
RBC # FLD: 15.4 % — HIGH (ref 10.3–14.5)
SODIUM SERPL-SCNC: 135 MMOL/L — SIGNIFICANT CHANGE UP (ref 135–145)
VANCOMYCIN TROUGH SERPL-MCNC: 27.6 UG/ML — CRITICAL HIGH (ref 10–20)
WBC # BLD: 6.18 K/UL — SIGNIFICANT CHANGE UP (ref 3.8–10.5)
WBC # FLD AUTO: 6.18 K/UL — SIGNIFICANT CHANGE UP (ref 3.8–10.5)

## 2022-02-08 PROCEDURE — 93306 TTE W/DOPPLER COMPLETE: CPT | Mod: 26

## 2022-02-08 PROCEDURE — 99232 SBSQ HOSP IP/OBS MODERATE 35: CPT

## 2022-02-08 RX ORDER — HYDROXYCHLOROQUINE SULFATE 200 MG/1
200 TABLET, FILM COATED ORAL DAILY
Refills: 0 | Status: ACTIVE | COMMUNITY
Start: 2022-02-08

## 2022-02-08 RX ORDER — HEPARIN SODIUM 5000 [USP'U]/ML
4000 INJECTION INTRAVENOUS; SUBCUTANEOUS EVERY 6 HOURS
Refills: 0 | Status: DISCONTINUED | OUTPATIENT
Start: 2022-02-08 | End: 2022-02-08

## 2022-02-08 RX ORDER — HEPARIN SODIUM 5000 [USP'U]/ML
4000 INJECTION INTRAVENOUS; SUBCUTANEOUS ONCE
Refills: 0 | Status: COMPLETED | OUTPATIENT
Start: 2022-02-08 | End: 2022-02-08

## 2022-02-08 RX ORDER — HEPARIN SODIUM 5000 [USP'U]/ML
INJECTION INTRAVENOUS; SUBCUTANEOUS
Qty: 25000 | Refills: 0 | Status: DISCONTINUED | OUTPATIENT
Start: 2022-02-08 | End: 2022-02-08

## 2022-02-08 RX ORDER — OXYCODONE HYDROCHLORIDE 5 MG/1
5 TABLET ORAL ONCE
Refills: 0 | Status: DISCONTINUED | OUTPATIENT
Start: 2022-02-08 | End: 2022-02-08

## 2022-02-08 RX ORDER — ATOVAQUONE 750 MG/5ML
750 SUSPENSION ORAL TWICE DAILY
Refills: 0 | Status: ACTIVE | COMMUNITY
Start: 2022-02-08

## 2022-02-08 RX ORDER — OXYCODONE AND ACETAMINOPHEN 5; 325 MG/1; MG/1
1 TABLET ORAL EVERY 4 HOURS
Refills: 0 | Status: DISCONTINUED | OUTPATIENT
Start: 2022-02-08 | End: 2022-02-09

## 2022-02-08 RX ORDER — HEPARIN SODIUM 5000 [USP'U]/ML
4000 INJECTION INTRAVENOUS; SUBCUTANEOUS EVERY 6 HOURS
Refills: 0 | Status: DISCONTINUED | OUTPATIENT
Start: 2022-02-08 | End: 2022-02-11

## 2022-02-08 RX ORDER — PANTOPRAZOLE 40 MG/1
40 TABLET, DELAYED RELEASE ORAL
Qty: 90 | Refills: 1 | Status: ACTIVE | COMMUNITY
Start: 2022-02-08

## 2022-02-08 RX ORDER — LEVETIRACETAM 250 MG/1
250 TABLET, FILM COATED ORAL
Refills: 0 | Status: ACTIVE | COMMUNITY
Start: 2022-02-08

## 2022-02-08 RX ORDER — HEPARIN SODIUM 5000 [USP'U]/ML
2000 INJECTION INTRAVENOUS; SUBCUTANEOUS EVERY 6 HOURS
Refills: 0 | Status: DISCONTINUED | OUTPATIENT
Start: 2022-02-08 | End: 2022-02-08

## 2022-02-08 RX ORDER — HYDRALAZINE HYDROCHLORIDE 25 MG/1
25 TABLET ORAL 3 TIMES DAILY
Refills: 0 | Status: ACTIVE | COMMUNITY
Start: 2022-02-08

## 2022-02-08 RX ORDER — HEPARIN SODIUM 5000 [USP'U]/ML
4000 INJECTION INTRAVENOUS; SUBCUTANEOUS ONCE
Refills: 0 | Status: COMPLETED | OUTPATIENT
Start: 2022-02-08 | End: 2022-02-09

## 2022-02-08 RX ORDER — SILVER SULFADIAZINE 10 MG/G
1 CREAM TOPICAL DAILY
Refills: 0 | Status: ACTIVE | COMMUNITY
Start: 2022-02-08

## 2022-02-08 RX ORDER — SEVELAMER HYDROCHLORIDE 800 MG/1
800 TABLET, FILM COATED ORAL 3 TIMES DAILY
Refills: 0 | Status: ACTIVE | COMMUNITY
Start: 2022-02-08

## 2022-02-08 RX ORDER — NICOTINE 21 MG/24HR
14 PATCH, TRANSDERMAL 24 HOURS TRANSDERMAL DAILY
Refills: 0 | Status: ACTIVE | COMMUNITY
Start: 2022-02-08

## 2022-02-08 RX ORDER — METOPROLOL TARTRATE 50 MG/1
50 TABLET, FILM COATED ORAL
Qty: 180 | Refills: 3 | Status: ACTIVE | COMMUNITY
Start: 2022-02-08

## 2022-02-08 RX ORDER — HEPARIN SODIUM 5000 [USP'U]/ML
2000 INJECTION INTRAVENOUS; SUBCUTANEOUS EVERY 6 HOURS
Refills: 0 | Status: DISCONTINUED | OUTPATIENT
Start: 2022-02-08 | End: 2022-02-11

## 2022-02-08 RX ORDER — LISINOPRIL 10 MG/1
10 TABLET ORAL
Qty: 90 | Refills: 1 | Status: ACTIVE | COMMUNITY
Start: 2022-02-08

## 2022-02-08 RX ORDER — ALBUTEROL SULFATE 90 UG/1
108 (90 BASE) INHALANT RESPIRATORY (INHALATION)
Refills: 0 | Status: ACTIVE | COMMUNITY
Start: 2022-02-08

## 2022-02-08 RX ORDER — ACETAMINOPHEN 325 MG/1
325 TABLET, FILM COATED ORAL
Refills: 0 | Status: ACTIVE | COMMUNITY
Start: 2022-02-08

## 2022-02-08 RX ORDER — GABAPENTIN 100 MG/1
100 CAPSULE ORAL
Refills: 0 | Status: ACTIVE | COMMUNITY
Start: 2022-02-08

## 2022-02-08 RX ORDER — LEVETIRACETAM 500 MG/1
500 TABLET, FILM COATED ORAL
Refills: 0 | Status: ACTIVE | COMMUNITY
Start: 2022-02-08

## 2022-02-08 RX ORDER — HEPARIN SODIUM 5000 [USP'U]/ML
INJECTION INTRAVENOUS; SUBCUTANEOUS
Qty: 25000 | Refills: 0 | Status: DISCONTINUED | OUTPATIENT
Start: 2022-02-08 | End: 2022-02-11

## 2022-02-08 RX ORDER — MELATONIN 3 MG
3 CAPSULE ORAL
Refills: 0 | Status: ACTIVE | COMMUNITY
Start: 2022-02-08

## 2022-02-08 RX ADMIN — OXYCODONE AND ACETAMINOPHEN 1 TABLET(S): 5; 325 TABLET ORAL at 18:45

## 2022-02-08 RX ADMIN — Medication 12.5 MILLIGRAM(S): at 22:15

## 2022-02-08 RX ADMIN — Medication 1 APPLICATION(S): at 10:23

## 2022-02-08 RX ADMIN — Medication 650 MILLIGRAM(S): at 13:33

## 2022-02-08 RX ADMIN — OXYCODONE AND ACETAMINOPHEN 1 TABLET(S): 5; 325 TABLET ORAL at 22:23

## 2022-02-08 RX ADMIN — Medication 200 MILLIGRAM(S): at 10:02

## 2022-02-08 RX ADMIN — SEVELAMER CARBONATE 800 MILLIGRAM(S): 2400 POWDER, FOR SUSPENSION ORAL at 17:49

## 2022-02-08 RX ADMIN — OXYCODONE AND ACETAMINOPHEN 1 TABLET(S): 5; 325 TABLET ORAL at 23:16

## 2022-02-08 RX ADMIN — Medication 25 MILLIGRAM(S): at 22:15

## 2022-02-08 RX ADMIN — Medication 1 DROP(S): at 22:19

## 2022-02-08 RX ADMIN — OXYCODONE HYDROCHLORIDE 5 MILLIGRAM(S): 5 TABLET ORAL at 13:57

## 2022-02-08 RX ADMIN — OXYCODONE HYDROCHLORIDE 5 MILLIGRAM(S): 5 TABLET ORAL at 13:27

## 2022-02-08 RX ADMIN — PANTOPRAZOLE SODIUM 40 MILLIGRAM(S): 20 TABLET, DELAYED RELEASE ORAL at 10:01

## 2022-02-08 RX ADMIN — HEPARIN SODIUM 900 UNIT(S)/HR: 5000 INJECTION INTRAVENOUS; SUBCUTANEOUS at 22:47

## 2022-02-08 RX ADMIN — ATOVAQUONE 1500 MILLIGRAM(S): 750 SUSPENSION ORAL at 10:02

## 2022-02-08 RX ADMIN — Medication 1 APPLICATION(S): at 22:20

## 2022-02-08 RX ADMIN — GABAPENTIN 100 MILLIGRAM(S): 400 CAPSULE ORAL at 22:15

## 2022-02-08 RX ADMIN — Medication 1 DROP(S): at 10:22

## 2022-02-08 RX ADMIN — Medication 650 MILLIGRAM(S): at 14:03

## 2022-02-08 RX ADMIN — APIXABAN 2.5 MILLIGRAM(S): 2.5 TABLET, FILM COATED ORAL at 10:04

## 2022-02-08 RX ADMIN — LEVETIRACETAM 500 MILLIGRAM(S): 250 TABLET, FILM COATED ORAL at 10:03

## 2022-02-08 RX ADMIN — Medication 3 MILLIGRAM(S): at 22:15

## 2022-02-08 RX ADMIN — Medication 12.5 MILLIGRAM(S): at 10:04

## 2022-02-08 RX ADMIN — Medication 1 APPLICATION(S): at 13:36

## 2022-02-08 RX ADMIN — SEVELAMER CARBONATE 800 MILLIGRAM(S): 2400 POWDER, FOR SUSPENSION ORAL at 13:28

## 2022-02-08 RX ADMIN — OXYCODONE AND ACETAMINOPHEN 1 TABLET(S): 5; 325 TABLET ORAL at 18:15

## 2022-02-08 RX ADMIN — SEVELAMER CARBONATE 800 MILLIGRAM(S): 2400 POWDER, FOR SUSPENSION ORAL at 10:01

## 2022-02-08 RX ADMIN — HEPARIN SODIUM 4000 UNIT(S): 5000 INJECTION INTRAVENOUS; SUBCUTANEOUS at 22:15

## 2022-02-08 NOTE — CDI QUERY NOTE - NSCDIOTHERTXTBX_GEN_ALL_CORE_HH
This patient was admitted with bacteremia and a clot near the IVC:    Progress Note Adult-Hospitalist Attending 2-7-22 @ 10:46  now with clot near IVC  #MRSA  Bacteremia   - had cultures drawn in ER which  positive for MRSA   - Nephrology Planning to remove permacath   Ordering Echo     # Stage 4 Sacral Ulcer present on admission   Frequent turn   Wound care daily , on Collagenase     Infectious Disease Attending 2-7-22 @ 11:03  1. MRSA sepsis/bacteremia. source ? catheter ? sacral decub wound?  hx substance abuse. ESRD  - s/p vancomycin x 1 2/5 level 24 - will re-dose 500mg post HD today    This patient seems to have met 4 SIRS criteria upon admission:  WBC Count: 14.17 K/uL (02.04.22 @ 14:06)  HR: 136  Temp: 103.5  RR: 22    Possible source of infections: Permacath, stage 4 pressure ulcer.    Can the diagnosis of sepsis be clarified?  A) Sepsis, present on admission, likely due to permacath and stage 4 pressure ulcer.  B) Sepsis, developed after admission, please state etiology if known.  C) No clinical indication of sepsis.  D) Unable to determine.

## 2022-02-08 NOTE — PROGRESS NOTE ADULT - SUBJECTIVE AND OBJECTIVE BOX
2/5 : Patient is well known to me she was just discharged 2 days ago after a long stay . She does not look post ictal. She does not look septic. She is noted to have clot by her HD cath tip. She is on eliquis. I have discontinued her ABX     2/6 : late last night patients blood cutlures x 1 were positive . I gave her a dose of vanco yesterday. Will repeat blood cultures and get vanco trough today. I have placed official ID consult . She is laying in bed comfortably with no complaints . I had long conversation again with her about life and death and she understands how poor her prognosis is       Vital Signs Last 24 Hrs  T(C): 36.8 (08 Feb 2022 09:52), Max: 36.9 (07 Feb 2022 19:46)  T(F): 98.3 (08 Feb 2022 09:52), Max: 98.5 (07 Feb 2022 19:46)  HR: 98 (08 Feb 2022 09:52) (98 - 108)  BP: 149/101 (08 Feb 2022 09:52) (126/87 - 149/101)  BP(mean): --  RR: 19 (08 Feb 2022 09:52) (18 - 19)  SpO2: 100% (08 Feb 2022 09:52) (100% - 100%)  # General NAD   CONSTITUTIONAL: Well groomed, no apparent distress  EYES: PERRLA and symmetric, EOMI, No conjunctival or scleral injection, non-icteric  ENMT: Oral mucosa with moist membranes. No external nasal lesions; nasal mucosa not inflamed; normal dentition;  NECK: Supple, symmetric and without tracheal deviation;   RESPIRATORY:CTA b/l, no wheezes, rales or rhonch  CARDIOVASCULAR: RRRR, +S1S2, no murmurs, no rubs, no gallops; no JVD; no peripheral edema  GASTROINTESTINAL: Soft, non tender, non distended, no rebound, no guarding; No palpable masses; no hepatosplenomegaly; no hernia palpated;  LYMPHATIC: No cervical LAD or tenderness; no axillary LAD or tenderness; no inguinal LAD or tenderness  MUSCULOSKELETAL: Normal gait and station; no digital clubbing or cyanosis; e  SKIN: No rashes or ulcers noted; no subcutaneous nodules or induration palpable  NEUROLOGIC: Alert, awake CN II-XII intact; normal reflexes in upper and lower extremities, sensation intact in upper and lower extremities b/l to light touch; PSYCHIATRIC: Appropriate insight/judgment; A+O x 3, mood and affect appropriate, recent/remote memory intact  02-08    135  |  101  |  11  ----------------------------<  89  3.1<L>   |  22  |  3.86<H>    Ca    8.3<L>      08 Feb 2022 07:46    TPro  6.1  /  Alb  1.9<L>  /  TBili  0.2  /  DBili  x   /  AST  12<L>  /  ALT  12  /  AlkPhos  81  02-08                            7.7    6.18  )-----------( 185      ( 08 Feb 2022 07:46 )             23.8             LIVER FUNCTIONS - ( 08 Feb 2022 07:46 )  Alb: 1.9 g/dL / Pro: 6.1 gm/dL / ALK PHOS: 81 U/L / ALT: 12 U/L / AST: 12 U/L / GGT: x               MEDICATIONS  (STANDING):  apixaban 2.5 milliGRAM(s) Oral two times a day  artificial tears (preservative free) Ophthalmic Solution 1 Drop(s) Both EYES two times a day  atovaquone  Suspension 1500 milliGRAM(s) Oral daily  collagenase Ointment 1 Application(s) Topical daily  epoetin amee-epbx (RETACRIT) Injectable 46163 Unit(s) IV Push <User Schedule>  gabapentin 100 milliGRAM(s) Oral three times a day  hydroxychloroquine 200 milliGRAM(s) Oral daily  levETIRAcetam 250 milliGRAM(s) Oral <User Schedule>  levETIRAcetam 500 milliGRAM(s) Oral daily  metoprolol tartrate 12.5 milliGRAM(s) Oral two times a day  pantoprazole    Tablet 40 milliGRAM(s) Oral before breakfast  sevelamer carbonate 800 milliGRAM(s) Oral three times a day with meals  silver sulfADIAZINE 1% Cream 1 Application(s) Topical two times a day    MEDICATIONS  (PRN):  acetaminophen     Tablet .. 650 milliGRAM(s) Oral every 6 hours PRN Temp greater or equal to 38.5C (101.3F), Mild Pain (1 - 3)  albumin human 25% IVPB 50 milliLiter(s) IV Intermittent every 1 hour PRN SBP less than 100  ALBUTerol    90 MICROgram(s) HFA Inhaler 2 Puff(s) Inhalation every 6 hours PRN Shortness of Breath and/or Wheezing  diphenhydrAMINE 25 milliGRAM(s) Oral every 6 hours PRN Rash and/or Itching  loperamide 2 milliGRAM(s) Oral every 6 hours PRN Diarrhea  melatonin 3 milliGRAM(s) Oral at bedtime PRN Insomnia  senna 2 Tablet(s) Oral at bedtime PRN Constipation    # Limited study toassess left ventricular function.   Moderately reduced left ventricular systolic function.   Estimated left ventricular ejection fraction is 40-45 %.   The left ventricle size is normal with global (diffuse) hypokinesis,   dyskinesis and moderately reduced function.     2/5 : Patient is well known to me she was just discharged 2 days ago after a long stay . She does not look post ictal. She does not look septic. She is noted to have clot by her HD cath tip. She is on eliquis. I have discontinued her ABX     2/6 : late last night patients blood cutlures x 1 were positive . I gave her a dose of vanco yesterday. Will repeat blood cultures and get vanco trough today. I have placed official ID consult . She is laying in bed comfortably with no complaints . I had long conversation again with her about life and death and she understands how poor her prognosis is     02/08 Afebrile     Vital Signs Last 24 Hrs  T(C): 36.8 (08 Feb 2022 09:52), Max: 36.9 (07 Feb 2022 19:46)  T(F): 98.3 (08 Feb 2022 09:52), Max: 98.5 (07 Feb 2022 19:46)  HR: 98 (08 Feb 2022 09:52) (98 - 108)  BP: 149/101 (08 Feb 2022 09:52) (126/87 - 149/101)  BP(mean): --  RR: 19 (08 Feb 2022 09:52) (18 - 19)  SpO2: 100% (08 Feb 2022 09:52) (100% - 100%)  # General NAD   CONSTITUTIONAL: Well groomed, no apparent distress  EYES: PERRLA and symmetric, EOMI, No conjunctival or scleral injection, non-icteric  ENMT: Oral mucosa with moist membranes. No external nasal lesions; nasal mucosa not inflamed; normal dentition;  NECK: Supple, symmetric and without tracheal deviation;   RESPIRATORY:CTA b/l, no wheezes, rales or rhonch  CARDIOVASCULAR: RRRR, +S1S2, no murmurs, no rubs, no gallops; no JVD; no peripheral edema  GASTROINTESTINAL: Soft, non tender, non distended, no rebound, no guarding; No palpable masses; no hepatosplenomegaly; no hernia palpated;  LYMPHATIC: No cervical LAD or tenderness; no axillary LAD or tenderness; no inguinal LAD or tenderness  MUSCULOSKELETAL: Normal gait and station; no digital clubbing or cyanosis; e  SKIN: No rashes or ulcers noted; no subcutaneous nodules or induration palpable  NEUROLOGIC: Alert, awake CN II-XII intact; normal reflexes in upper and lower extremities, sensation intact in upper and lower extremities b/l to light touch; PSYCHIATRIC: Appropriate insight/judgment; A+O x 3, mood and affect appropriate, recent/remote memory intact  02-08    135  |  101  |  11  ----------------------------<  89  3.1<L>   |  22  |  3.86<H>    Ca    8.3<L>      08 Feb 2022 07:46    TPro  6.1  /  Alb  1.9<L>  /  TBili  0.2  /  DBili  x   /  AST  12<L>  /  ALT  12  /  AlkPhos  81  02-08                            7.7    6.18  )-----------( 185      ( 08 Feb 2022 07:46 )             23.8             LIVER FUNCTIONS - ( 08 Feb 2022 07:46 )  Alb: 1.9 g/dL / Pro: 6.1 gm/dL / ALK PHOS: 81 U/L / ALT: 12 U/L / AST: 12 U/L / GGT: x               MEDICATIONS  (STANDING):  apixaban 2.5 milliGRAM(s) Oral two times a day  artificial tears (preservative free) Ophthalmic Solution 1 Drop(s) Both EYES two times a day  atovaquone  Suspension 1500 milliGRAM(s) Oral daily  collagenase Ointment 1 Application(s) Topical daily  epoetin amee-epbx (RETACRIT) Injectable 02242 Unit(s) IV Push <User Schedule>  gabapentin 100 milliGRAM(s) Oral three times a day  hydroxychloroquine 200 milliGRAM(s) Oral daily  levETIRAcetam 250 milliGRAM(s) Oral <User Schedule>  levETIRAcetam 500 milliGRAM(s) Oral daily  metoprolol tartrate 12.5 milliGRAM(s) Oral two times a day  pantoprazole    Tablet 40 milliGRAM(s) Oral before breakfast  sevelamer carbonate 800 milliGRAM(s) Oral three times a day with meals  silver sulfADIAZINE 1% Cream 1 Application(s) Topical two times a day    MEDICATIONS  (PRN):  acetaminophen     Tablet .. 650 milliGRAM(s) Oral every 6 hours PRN Temp greater or equal to 38.5C (101.3F), Mild Pain (1 - 3)  albumin human 25% IVPB 50 milliLiter(s) IV Intermittent every 1 hour PRN SBP less than 100  ALBUTerol    90 MICROgram(s) HFA Inhaler 2 Puff(s) Inhalation every 6 hours PRN Shortness of Breath and/or Wheezing  diphenhydrAMINE 25 milliGRAM(s) Oral every 6 hours PRN Rash and/or Itching  loperamide 2 milliGRAM(s) Oral every 6 hours PRN Diarrhea  melatonin 3 milliGRAM(s) Oral at bedtime PRN Insomnia  senna 2 Tablet(s) Oral at bedtime PRN Constipation    # Limited study toassess left ventricular function.   Moderately reduced left ventricular systolic function.   Estimated left ventricular ejection fraction is 40-45 %.   The left ventricle size is normal with global (diffuse) hypokinesis,   dyskinesis and moderately reduced function.     2/5 : Patient is well known to me she was just discharged 2 days ago after a long stay . She does not look post ictal. She does not look septic. She is noted to have clot by her HD cath tip. She is on eliquis. I have discontinued her ABX     2/6 : late last night patients blood cutlures x 1 were positive . I gave her a dose of vanco yesterday. Will repeat blood cultures and get vanco trough today. I have placed official ID consult . She is laying in bed comfortably with no complaints . I had long conversation again with her about life and death and she understands how poor her prognosis is     02/08 Afebrile , c/o right neck pain where Catheter was removed   Vital Signs Last 24 Hrs  T(C): 36.8 (08 Feb 2022 09:52), Max: 36.9 (07 Feb 2022 19:46)  T(F): 98.3 (08 Feb 2022 09:52), Max: 98.5 (07 Feb 2022 19:46)  HR: 98 (08 Feb 2022 09:52) (98 - 108)  BP: 149/101 (08 Feb 2022 09:52) (126/87 - 149/101)  BP(mean): --  RR: 19 (08 Feb 2022 09:52) (18 - 19)  SpO2: 100% (08 Feb 2022 09:52) (100% - 100%)  # General NAD   CONSTITUTIONAL: Well groomed, no apparent distress  EYES: PERRLA and symmetric, EOMI, No conjunctival or scleral injection, non-icteric  ENMT: Oral mucosa with moist membranes. No external nasal lesions; nasal mucosa not inflamed; normal dentition;  NECK: Supple, symmetric and without tracheal deviation;   RESPIRATORY:CTA b/l, no wheezes, rales or rhonch  CARDIOVASCULAR: RRRR, +S1S2, no murmurs, no rubs, no gallops; no JVD; no peripheral edema  GASTROINTESTINAL: Soft, non tender, non distended, no rebound, no guarding; No palpable masses; no hepatosplenomegaly; no hernia palpated;  LYMPHATIC: No cervical LAD or tenderness; no axillary LAD or tenderness; no inguinal LAD or tenderness  MUSCULOSKELETAL: Normal gait and station; no digital clubbing or cyanosis; e  SKIN: No rashes or ulcers noted; no subcutaneous nodules or induration palpable  NEUROLOGIC: Alert, awake CN II-XII intact; normal reflexes in upper and lower extremities, sensation intact in upper and lower extremities b/l to light touch; PSYCHIATRIC: Appropriate insight/judgment; A+O x 3, mood and affect appropriate, recent/remote memory intact  02-08    135  |  101  |  11  ----------------------------<  89  3.1<L>   |  22  |  3.86<H>    Ca    8.3<L>      08 Feb 2022 07:46    TPro  6.1  /  Alb  1.9<L>  /  TBili  0.2  /  DBili  x   /  AST  12<L>  /  ALT  12  /  AlkPhos  81  02-08                            7.7    6.18  )-----------( 185      ( 08 Feb 2022 07:46 )             23.8             LIVER FUNCTIONS - ( 08 Feb 2022 07:46 )  Alb: 1.9 g/dL / Pro: 6.1 gm/dL / ALK PHOS: 81 U/L / ALT: 12 U/L / AST: 12 U/L / GGT: x               MEDICATIONS  (STANDING):  apixaban 2.5 milliGRAM(s) Oral two times a day  artificial tears (preservative free) Ophthalmic Solution 1 Drop(s) Both EYES two times a day  atovaquone  Suspension 1500 milliGRAM(s) Oral daily  collagenase Ointment 1 Application(s) Topical daily  epoetin amee-epbx (RETACRIT) Injectable 24782 Unit(s) IV Push <User Schedule>  gabapentin 100 milliGRAM(s) Oral three times a day  hydroxychloroquine 200 milliGRAM(s) Oral daily  levETIRAcetam 250 milliGRAM(s) Oral <User Schedule>  levETIRAcetam 500 milliGRAM(s) Oral daily  metoprolol tartrate 12.5 milliGRAM(s) Oral two times a day  pantoprazole    Tablet 40 milliGRAM(s) Oral before breakfast  sevelamer carbonate 800 milliGRAM(s) Oral three times a day with meals  silver sulfADIAZINE 1% Cream 1 Application(s) Topical two times a day    MEDICATIONS  (PRN):  acetaminophen     Tablet .. 650 milliGRAM(s) Oral every 6 hours PRN Temp greater or equal to 38.5C (101.3F), Mild Pain (1 - 3)  albumin human 25% IVPB 50 milliLiter(s) IV Intermittent every 1 hour PRN SBP less than 100  ALBUTerol    90 MICROgram(s) HFA Inhaler 2 Puff(s) Inhalation every 6 hours PRN Shortness of Breath and/or Wheezing  diphenhydrAMINE 25 milliGRAM(s) Oral every 6 hours PRN Rash and/or Itching  loperamide 2 milliGRAM(s) Oral every 6 hours PRN Diarrhea  melatonin 3 milliGRAM(s) Oral at bedtime PRN Insomnia  senna 2 Tablet(s) Oral at bedtime PRN Constipation    # Limited study toassess left ventricular function.   Moderately reduced left ventricular systolic function.   Estimated left ventricular ejection fraction is 40-45 %.   The left ventricle size is normal with global (diffuse) hypokinesis,   dyskinesis and moderately reduced function.     2/5 : Patient is well known to me she was just discharged 2 days ago after a long stay . She does not look post ictal. She does not look septic. She is noted to have clot by her HD cath tip. She is on eliquis. I have discontinued her ABX     2/6 : late last night patients blood cutlures x 1 were positive . I gave her a dose of vanco yesterday. Will repeat blood cultures and get vanco trough today. I have placed official ID consult . She is laying in bed comfortably with no complaints . I had long conversation again with her about life and death and she understands how poor her prognosis is   02/08 Afebrile , c/o right neck pain where Catheter was removed   Vital Signs Last 24 Hrs  T(C): 36.8 (08 Feb 2022 09:52), Max: 36.9 (07 Feb 2022 19:46)  T(F): 98.3 (08 Feb 2022 09:52), Max: 98.5 (07 Feb 2022 19:46)  HR: 98 (08 Feb 2022 09:52) (98 - 108)  BP: 149/101 (08 Feb 2022 09:52) (126/87 - 149/101)  BP(mean): --  RR: 19 (08 Feb 2022 09:52) (18 - 19)  SpO2: 100% (08 Feb 2022 09:52) (100% - 100%)  # General NAD   CONSTITUTIONAL: Well groomed, no apparent distress  EYES: PERRLA and symmetric, EOMI, No conjunctival or scleral injection, non-icteric  ENMT: Oral mucosa with moist membranes. No external nasal lesions; nasal mucosa not inflamed; normal dentition;  NECK: Supple, symmetric and without tracheal deviation;   RESPIRATORY:CTA b/l, no wheezes, rales or rhonch  CARDIOVASCULAR: RRRR, +S1S2, no murmurs, no rubs, no gallops; no JVD; no peripheral edema  GASTROINTESTINAL: Soft, non tender, non distended, no rebound, no guarding; No palpable masses; no hepatosplenomegaly; no hernia palpated;  LYMPHATIC: No cervical LAD or tenderness; no axillary LAD or tenderness; no inguinal LAD or tenderness  MUSCULOSKELETAL: Normal gait and station; no digital clubbing or cyanosis; e  SKIN: No rashes or ulcers noted; no subcutaneous nodules or induration palpable  NEUROLOGIC: Alert, awake CN II-XII intact; normal reflexes in upper and lower extremities, sensation intact in upper and lower extremities b/l to light touch; PSYCHIATRIC: Appropriate insight/judgment; A+O x 3, mood and affect appropriate, recent/remote memory intact  02-08    135  |  101  |  11  ----------------------------<  89  3.1<L>   |  22  |  3.86<H>    Ca    8.3<L>      08 Feb 2022 07:46    TPro  6.1  /  Alb  1.9<L>  /  TBili  0.2  /  DBili  x   /  AST  12<L>  /  ALT  12  /  AlkPhos  81  02-08                            7.7    6.18  )-----------( 185      ( 08 Feb 2022 07:46 )             23.8             LIVER FUNCTIONS - ( 08 Feb 2022 07:46 )  Alb: 1.9 g/dL / Pro: 6.1 gm/dL / ALK PHOS: 81 U/L / ALT: 12 U/L / AST: 12 U/L / GGT: x               MEDICATIONS  (STANDING):  apixaban 2.5 milliGRAM(s) Oral two times a day  artificial tears (preservative free) Ophthalmic Solution 1 Drop(s) Both EYES two times a day  atovaquone  Suspension 1500 milliGRAM(s) Oral daily  collagenase Ointment 1 Application(s) Topical daily  epoetin amee-epbx (RETACRIT) Injectable 28415 Unit(s) IV Push <User Schedule>  gabapentin 100 milliGRAM(s) Oral three times a day  hydroxychloroquine 200 milliGRAM(s) Oral daily  levETIRAcetam 250 milliGRAM(s) Oral <User Schedule>  levETIRAcetam 500 milliGRAM(s) Oral daily  metoprolol tartrate 12.5 milliGRAM(s) Oral two times a day  pantoprazole    Tablet 40 milliGRAM(s) Oral before breakfast  sevelamer carbonate 800 milliGRAM(s) Oral three times a day with meals  silver sulfADIAZINE 1% Cream 1 Application(s) Topical two times a day    MEDICATIONS  (PRN):  acetaminophen     Tablet .. 650 milliGRAM(s) Oral every 6 hours PRN Temp greater or equal to 38.5C (101.3F), Mild Pain (1 - 3)  albumin human 25% IVPB 50 milliLiter(s) IV Intermittent every 1 hour PRN SBP less than 100  ALBUTerol    90 MICROgram(s) HFA Inhaler 2 Puff(s) Inhalation every 6 hours PRN Shortness of Breath and/or Wheezing  diphenhydrAMINE 25 milliGRAM(s) Oral every 6 hours PRN Rash and/or Itching  loperamide 2 milliGRAM(s) Oral every 6 hours PRN Diarrhea  melatonin 3 milliGRAM(s) Oral at bedtime PRN Insomnia  senna 2 Tablet(s) Oral at bedtime PRN Constipation    # Limited study toassess left ventricular function.   Moderately reduced left ventricular systolic function.   Estimated left ventricular ejection fraction is 40-45 %.   The left ventricle size is normal with global (diffuse) hypokinesis,   dyskinesis and moderately reduced function.

## 2022-02-08 NOTE — CHART NOTE - NSCHARTNOTEFT_GEN_A_CORE
HPI:  Pt is a pleasant 42 yo female w/PMH of ESRD (M/W/F), GERD, bacteremia, seizures, epilepsy, depression, COPD, HTN, heroin abuse, RA, Cdiff colitis in Dec 2021 smoker, recent admission for Aspiration Pna who  presents to [place]  [facility] via ambulance from dialysis center for brief loss of consciousness, questionable seizure vs syncope.   EMS reported she had a 15 sec generalized seizure and was AAOx4 afterwards.  Pt states she was having shivering and not having a seizure.  She reported that her normal seizures involve her  whole body, unlike today.    She c/o  night sweats last night and chills this morning.  Today she had  1.5 hours of hemodialysis instead of her usual HD.   Pt denies cpain/SOB, no cough or resp complaints, no  abd pain.  She reported having a loose stool and vomiting x1 today and two episodes yesterday.  She has urine once a day and denies dysuria. (04 Feb 2022 17:35)    PERTINENT PMH REVIEWED:  [x ] YES [ ] NO           Primary Contact: brother Blake Moses ([phone]) or aunt Gregoria Hernandez ([phone])    HCP [x] Surrogate [   ] Guardian [   ]  - updated HCP today naming brother as primary & aunt as alternate.     Mental Status: Alert  [ x] Oriented [x ] Confused [  ] Lethargic [  ]  Concerns of Depression [  ] denies  Anxiety [   ] denies  Baseline ADLs (prior to admission):  Independent [ ] moderately [ ] fully   Dependent   [ x] moderately [ ]fully    Family Meeting attendees: GOC pending    Anticipated Grief: Patient[ x ] Family [  ]    Caregiver [place] Assessed: Yes [ x ] No [  ]    Jew: None identified.     Spiritual Concerns: None identified.     Goals of Care: To be discussed during GOC    Previous Services: [facility] MWF, [facility] in past    ADVANCE DIRECTIVES:   HCP 2/8 on chart naming brother Blake as primary & aunt Gregoria as alternate      Anticipated D/C Plan: GOC discussion pending                     Summary: SW met with patient to introduce self & offer support. Palliative SW role explained. Patient appears alert, oriented with stable mood & able to make needs known. Pt denies any feelings of anxiety and/or depression. Pt is s/p permacath removal & describes some discomfort.     Pt confirms she resides at home with her brother Blake. Pt requested to complete new HCP & SW assisted. Brother Blake now primary health care agent & pt's Aunt Gregoria is now alternate. SW provided pt with original & copy placed on chart. RN aware.     Pt receptive to follow up. GOC meeting to be scheduled. Emotional support provided. Our team will continue to follow. HPI:  Pt is a pleasant 40 yo female w/PMH of ESRD (M/W/F), GERD, bacteremia, seizures, epilepsy, depression, COPD, HTN, heroin abuse, RA, Cdiff colitis in Dec 2021 smoker, recent admission for Aspiration Pna who  presents to [place]  [facility] via ambulance from dialysis center for brief loss of consciousness, questionable seizure vs syncope.   EMS reported she had a 15 sec generalized seizure and was AAOx4 afterwards.  Pt states she was having shivering and not having a seizure.  She reported that her normal seizures involve her  whole body, unlike today.    She c/o  night sweats last night and chills this morning.  Today she had  1.5 hours of hemodialysis instead of her usual HD.   Pt denies cpain/SOB, no cough or resp complaints, no  abd pain.  She reported having a loose stool and vomiting x1 today and two episodes yesterday.  She has urine once a day and denies dysuria. (04 Feb 2022 17:35)    PERTINENT PMH REVIEWED:  [x ] YES [ ] NO           Primary Contact: aunt Gregoria Hernandez ([phone])    HCP [x] Surrogate [   ] Guardian [   ]    Mental Status: Alert  [ x] Oriented [x ] Confused [  ] Lethargic [  ]  Concerns of Depression [  ] denies  Anxiety [   ] denies  Baseline ADLs (prior to admission):  Independent [ ] moderately [ ] fully   Dependent   [ x] moderately [ ]fully    Family Meeting attendees: GOC pending    Anticipated Grief: Patient[ x ] Family [  ]    Caregiver [place] Assessed: Yes [ x ] No [  ]    Anabaptism: None identified.     Spiritual Concerns: None identified.     Goals of Care: To be discussed during GOC    Previous Services: [facility] MWF, [facility] in past    ADVANCE DIRECTIVES:   HCP on chart naming her aunt Gregoria as primary health care agent      Anticipated D/C Plan: GOC discussion pending                     Summary: SW met with patient to introduce self & offer support. Palliative SW role explained. Patient appears alert, oriented with stable mood. Pt denies any feelings of anxiety and/or depression. Pt is s/p permacath removal & describes some discomfort. Pt reports that she resides at home with her brother Blake ([phone]). Pt offers no questions/concerns at this time & appears receptive to follow up while in hospital. SW relayed availability.     HCP on chart. GOC meeting to be scheduled. Emotional support provided. Our team will continue to follow.

## 2022-02-08 NOTE — PROGRESS NOTE ADULT - ASSESSMENT
42 yo female w/PMH of ESRD (M/W/F), GERD, bacteremia, seizures, epilepsy, depression, COPD, HTN, heroin abuse, RA, Cdiff colitis in Dec 2021 smoker, recent admission for Aspiration Pna who  presents to [place]  ED via ambulance from dialysis center for brief loss of consciousness, questionable seizure vs syncope.   EMS reported she had a 15 sec generalized seizure and was AAOx4 afterwards.  Pt states she was having shivering and not having a seizure.  She reported that her normal seizures involve her  whole body, unlike today.    She c/o  night sweats last night and chills this morning.  Today she had  1.5 hours of hemodialysis instead of her usual HD.   Pt denies cpain/SOB, no cough or resp complaints, no  abd pain.  She reported having a loose stool and vomiting x1 today and two episodes yesterday.  She has urine once a day and denies dysuria.    #. seizure?   now with clot near IVC  - currently she denies seizure  - she does not look postictal  -discontinue abx   - continue eliquis     #MRSA  Bacteremia   - had cultures drawn in ER which  positive for MRSA   - gave 1 dose vanco 1 gram on 2/5  - repeated blood cultures today  - will get formal ID consult   - Nephrology Planning to remove permacath   Echo Limited study toassess left ventricular function.   Moderately reduced left ventricular systolic function.   Estimated left ventricular ejection fraction is 40-45 %.   The left ventricle size is normal with global (diffuse) hypokinesis,   dyskinesis and moderately reduced function.        #  ESRD on hd  - HD   - HD per nephro    # Retroperitoneal Adenopathy.   - Oncology evaluated the patient , recommended IR guided Biopsy, As per Dr Herman   Can be done as outpatient.  Patient on ELiquis, with a discal thrombus on the Permacath needing AC   I will hold on IR ZGuided Biopsy for now       # Rheum arth  - continue plaquenil    # HTN  discontinue lisinopril  - lowered metoprolol to 12.5 bid     #Anemia  - Hb stable  - continue eliquis    # Stage 4 Sacral Ulcer present on admission   Frequent turn   Wound care daily , on Collagenase   Wound care Consulted     DVT ppx : heparin     code: FULL   42 yo female w/PMH of ESRD (M/W/F), GERD, bacteremia, seizures, epilepsy, depression, COPD, HTN, heroin abuse, RA, Cdiff colitis in Dec 2021 smoker, recent admission for Aspiration Pna who  presents to [facility]  ED via ambulance from dialysis center for brief loss of consciousness, questionable seizure vs syncope.   EMS reported she had a 15 sec generalized seizure and was AAOx4 afterwards.  Pt states she was having shivering and not having a seizure.  She reported that her normal seizures involve her  whole body, unlike today.    She c/o  night sweats last night and chills this morning.  Today she had  1.5 hours of hemodialysis instead of her usual HD.   Pt denies cpain/SOB, no cough or resp complaints, no  abd pain.  She reported having a loose stool and vomiting x1 today and two episodes yesterday.  She has urine once a day and denies dysuria.    #. Seizure?+  - currently she denies seizure  - she does not look postictal  -No more Seizure episodes  -on Keppra     #MRSA  Bacteremia   - had cultures drawn in ER which  positive for MRSA   - gave 1 dose vanco 1 gram on 2/5  - repeated blood cultures today  - will get formal ID consult   - Nephrology Planning to remove permacath   Echo Limited study toassess left ventricular function.   Moderately reduced left ventricular systolic function.   Estimated left ventricular ejection fraction is 40-45 %.   The left ventricle size is normal with global (diffuse) hypokinesis,   dyskinesis and moderately reduced function.      # IVC Thrombus likely related to Perma cath   Was on Eliquis Starting on Heparin Drip  Due to Possibility of procedure     #  ESRD on hd  - HD , Patient now without acces   - HD per nephro    # Retroperitoneal Adenopathy.   - Oncology evaluated the patient , recommended IR guided Biopsy, As per Dr Herman   Can be done as outpatient.  Patient on ELiquis, with a discal thrombus on the Permacath needing AC   I will hold on IR Guided Biopsy for now       # Rheum arth  - continue plaquenil    # HTN  discontinue lisinopril  - lowered metoprolol to 12.5 bid     #Anemia likely ACD   - Hb stable  - monitor     # Stage 4 Sacral Ulcer present on admission   Frequent turn   Wound care daily , on Collagenase   Wound care Consulted     DVT ppx : heparin     code: FULL   40 yo female w/PMH of ESRD (M/W/F), GERD, bacteremia, seizures, epilepsy, depression, COPD, HTN, heroin abuse, RA, Cdiff colitis in Dec 2021 smoker, recent admission for Aspiration Pna who  presents to [place]  ED via ambulance from dialysis center for brief loss of consciousness, questionable seizure vs syncope.   EMS reported she had a 15 sec generalized seizure and was AAOx4 afterwards.  Pt states she was having shivering and not having a seizure.  She reported that her normal seizures involve her  whole body, unlike today.    She c/o  night sweats last night and chills this morning.  Today she had  1.5 hours of hemodialysis instead of her usual HD.   Pt denies cpain/SOB, no cough or resp complaints, no  abd pain.  She reported having a loose stool and vomiting x1 today and two episodes yesterday.  She has urine once a day and denies dysuria.    #. Seizure?+  - currently she denies seizure  - she does not look postictal  -No more Seizure episodes  -on Keppra     #Sepsis due to MRSA  Bacteremia   - Lactic elevated trended down after Fluids  - had cultures drawn in ER which  positive for MRSA   - gave 1 dose vanco 1 gram on 2/5  - repeated blood cultures today  - will get formal ID consult   - Nephrology Planning to remove permacath   Echo Limited study toassess left ventricular function.   Moderately reduced left ventricular systolic function.   Estimated left ventricular ejection fraction is 40-45 %.   The left ventricle size is normal with global (diffuse) hypokinesis,   dyskinesis and moderately reduced function.      # IVC Thrombus likely related to Perma cath   Was on Eliquis Starting on Heparin Drip  Due to Possibility of procedure     #  ESRD on hd  - HD , Patient now without acces   - HD per nephro    # Retroperitoneal Adenopathy.   - Oncology evaluated the patient , recommended IR guided Biopsy, As per Dr Herman   Can be done as outpatient.  Patient on ELiquis, with a discal thrombus on the Permacath needing AC   I will hold on IR Guided Biopsy for now       # Rheum arth  - continue plaquenil    # HTN  discontinue lisinopril  - lowered metoprolol to 12.5 bid     #Anemia likely ACD   - Hb stable  - monitor     # Stage 4 Sacral Ulcer present on admission   Frequent turn   Wound care daily , on Collagenase   Wound care Consulted     DVT ppx : heparin     code: FULL   40 yo female w/PMH of ESRD (M/W/F), GERD, bacteremia, seizures, epilepsy, depression, COPD, HTN, heroin abuse, RA, Cdiff colitis in Dec 2021 smoker, recent admission for Aspiration Pna who  presents to [facility]  ED via ambulance from dialysis center for brief loss of consciousness, questionable seizure vs syncope.   EMS reported she had a 15 sec generalized seizure and was AAOx4 afterwards.  Pt states she was having shivering and not having a seizure.  She reported that her normal seizures involve her  whole body, unlike today.    She c/o  night sweats last night and chills this morning.  Today she had  1.5 hours of hemodialysis instead of her usual HD.   Pt denies cpain/SOB, no cough or resp complaints, no  abd pain.  She reported having a loose stool and vomiting x1 today and two episodes yesterday.  She has urine once a day and denies dysuria.    #. Seizure?+  - currently she denies seizure  - she does not look postictal  -No more Seizure episodes  -on Keppra     #Sepsis due to MRSA  Bacteremia   - Lactic elevated trended down after Fluids  - had cultures drawn in ER which  positive for MRSA   - gave 1 dose vanco 1 gram on 2/5  - repeated blood cultures today  - will get formal ID consult   - Removed permacath   Echo Limited study toassess left ventricular function.   Moderately reduced left ventricular systolic function.   Estimated left ventricular ejection fraction is 40-45 %.   The left ventricle size is normal with global (diffuse) hypokinesis,   dyskinesis and moderately reduced function.      # IVC Thrombus likely related to Perma cath   Was on Eliquis Starting on Heparin Drip  Due to Possibility of procedure     #  ESRD on hd  - HD , Patient now without acces   - HD per nephro    # Retroperitoneal Adenopathy.   - Oncology evaluated the patient , recommended IR guided Biopsy, As per Dr Herman   Can be done as outpatient.  Patient on ELiquis, with a discal thrombus on the Permacath needing AC   I will hold on IR Guided Biopsy for now       # Rheum arth  - continue plaquenil    # HTN  discontinue lisinopril  - lowered metoprolol to 12.5 bid     #Anemia likely ACD   - Hb stable  - monitor     # Stage 4 Sacral Ulcer present on admission   Frequent turn   Wound care daily , on Collagenase   Wound care Consulted     DVT ppx : heparin     code: FULL

## 2022-02-08 NOTE — PROGRESS NOTE ADULT - ASSESSMENT
40 yo female w/PMH of ESRD (M/W/F), GERD, bacteremia, seizures, epilepsy, depression, COPD, HTN, heroin abuse, RA, Cdiff colitis in Dec 2021 smoker, recent admission for Aspiration Pna who  presents to [place]  ED via ambulance from dialysis center for brief loss of consciousness, questionable seizure vs syncope.        NATALIA now ESRD on HD   No acute need for HD, last on 2/7. Catheter out   MRSA bacteremia   Vascular following for access,    line holiday = will need to reinsert permacath or temp HD catheter as per ID reccs later this week        -K protocol - 4 K today   -UF as tolerated    Hyponatremia  -UF with HD fluid removal   - limit free water    HTN  -oral meds    Anemia - TAMARA at HD          d/w HD staff, RN staff

## 2022-02-08 NOTE — PROGRESS NOTE ADULT - SUBJECTIVE AND OBJECTIVE BOX
Patient is a 41y Female who catheter removed.        MEDICATIONS  (STANDING):  apixaban 2.5 milliGRAM(s) Oral two times a day  artificial tears (preservative free) Ophthalmic Solution 1 Drop(s) Both EYES two times a day  atovaquone  Suspension 1500 milliGRAM(s) Oral daily  collagenase Ointment 1 Application(s) Topical daily  epoetin amee-epbx (RETACRIT) Injectable 05552 Unit(s) IV Push <User Schedule>  gabapentin 100 milliGRAM(s) Oral three times a day  hydroxychloroquine 200 milliGRAM(s) Oral daily  levETIRAcetam 250 milliGRAM(s) Oral <User Schedule>  levETIRAcetam 500 milliGRAM(s) Oral daily  metoprolol tartrate 12.5 milliGRAM(s) Oral two times a day  pantoprazole    Tablet 40 milliGRAM(s) Oral before breakfast  sevelamer carbonate 800 milliGRAM(s) Oral three times a day with meals  silver sulfADIAZINE 1% Cream 1 Application(s) Topical two times a day    MEDICATIONS  (PRN):  acetaminophen     Tablet .. 650 milliGRAM(s) Oral every 6 hours PRN Temp greater or equal to 38.5C (101.3F), Mild Pain (1 - 3)  albumin human 25% IVPB 50 milliLiter(s) IV Intermittent every 1 hour PRN SBP less than 100  ALBUTerol    90 MICROgram(s) HFA Inhaler 2 Puff(s) Inhalation every 6 hours PRN Shortness of Breath and/or Wheezing  diphenhydrAMINE 25 milliGRAM(s) Oral every 6 hours PRN Rash and/or Itching  loperamide 2 milliGRAM(s) Oral every 6 hours PRN Diarrhea  melatonin 3 milliGRAM(s) Oral at bedtime PRN Insomnia  senna 2 Tablet(s) Oral at bedtime PRN Constipation        T(C): , Max: 37.5 (02-07-22 @ 16:46)  T(F): , Max: 99.5 (02-07-22 @ 16:46)  HR: 98 (02-08-22 @ 09:52)  BP: 149/101 (02-08-22 @ 09:52)  BP(mean): --  RR: 19 (02-08-22 @ 09:52)  SpO2: 100% (02-08-22 @ 09:52)  Wt(kg): --    02-08 @ 07:01  -  02-08 @ 12:11  --------------------------------------------------------  IN: 480 mL / OUT: 0 mL / NET: 480 mL          PHYSICAL EXAM:    Constitutional: frail, >>stated age  HEENT: ELI, EOMI,  MMM  Neck: No LAD, No JVD  Respiratory: dist  Cardiovascular: S1 and S2  LE chronic changes  Neurological: asleep but arousable         LABS:                        7.7    6.18  )-----------( 185      ( 08 Feb 2022 07:46 )             23.8     08 Feb 2022 07:46    135    |  101    |  11     ----------------------------<  89     3.1     |  22     |  3.86   07 Feb 2022 07:49    130    |  99     |  18     ----------------------------<  84     3.1     |  20     |  4.88   06 Feb 2022 07:26    129    |  99     |  11     ----------------------------<  81     3.2     |  20     |  3.63   05 Feb 2022 06:54    125    |  93     |  31     ----------------------------<  65     5.0     |  19     |  6.29   04 Feb 2022 14:06    132    |  94     |  23     ----------------------------<  73     3.9     |  19     |  5.84     Ca    8.3        08 Feb 2022 07:46  Ca    8.5        07 Feb 2022 07:49  Ca    8.3        06 Feb 2022 07:26  Ca    8.1        05 Feb 2022 06:54  Ca    8.1        04 Feb 2022 14:06    TPro  6.1    /  Alb  1.9    /  TBili  0.2    /  DBili  x      /  AST  12     /  ALT  12     /  AlkPhos  81     08 Feb 2022 07:46  TPro  7.1    /  Alb  2.4    /  TBili  0.4    /  DBili  x      /  AST  79     /  ALT  22     /  AlkPhos  161    04 Feb 2022 14:06          Urine Studies:          RADIOLOGY & ADDITIONAL STUDIES:

## 2022-02-08 NOTE — PROGRESS NOTE ADULT - ASSESSMENT
42 yo F with ESRD on HD, presents with bacteremia s/p RIJ PC removal, in need of HD access.    Plan:  -f/u blood cultures - 2/4 MRSA, 2/6 NGTD  -ID on board, recs appreciated  -c/w IV abx  -nephro on board, recs appreciated  -will need temporary HD access in the interim  -will plan for a right AVF creation vs right AVG with biologic on this admission  -continue to save right arm - no BP cuffs, IVs or blood draws  -medical management as per primary team    Plan discussed with Dr. Lynch

## 2022-02-09 LAB
ANION GAP SERPL CALC-SCNC: 9 MMOL/L — SIGNIFICANT CHANGE UP (ref 5–17)
APTT BLD: 30.7 SEC — SIGNIFICANT CHANGE UP (ref 27.5–35.5)
APTT BLD: 33 SEC — SIGNIFICANT CHANGE UP (ref 27.5–35.5)
APTT BLD: 35.4 SEC — SIGNIFICANT CHANGE UP (ref 27.5–35.5)
BUN SERPL-MCNC: 20 MG/DL — SIGNIFICANT CHANGE UP (ref 7–23)
CALCIUM SERPL-MCNC: 8.3 MG/DL — LOW (ref 8.5–10.1)
CHLORIDE SERPL-SCNC: 100 MMOL/L — SIGNIFICANT CHANGE UP (ref 96–108)
CO2 SERPL-SCNC: 24 MMOL/L — SIGNIFICANT CHANGE UP (ref 22–31)
CREAT SERPL-MCNC: 4.98 MG/DL — HIGH (ref 0.5–1.3)
GLUCOSE SERPL-MCNC: 85 MG/DL — SIGNIFICANT CHANGE UP (ref 70–99)
HCT VFR BLD CALC: 23.7 % — LOW (ref 34.5–45)
HGB BLD-MCNC: 7.7 G/DL — LOW (ref 11.5–15.5)
INR BLD: 1.11 RATIO — SIGNIFICANT CHANGE UP (ref 0.88–1.16)
MCHC RBC-ENTMCNC: 29.2 PG — SIGNIFICANT CHANGE UP (ref 27–34)
MCHC RBC-ENTMCNC: 32.5 GM/DL — SIGNIFICANT CHANGE UP (ref 32–36)
MCV RBC AUTO: 89.8 FL — SIGNIFICANT CHANGE UP (ref 80–100)
PLATELET # BLD AUTO: 219 K/UL — SIGNIFICANT CHANGE UP (ref 150–400)
POTASSIUM SERPL-MCNC: 3.3 MMOL/L — LOW (ref 3.5–5.3)
POTASSIUM SERPL-SCNC: 3.3 MMOL/L — LOW (ref 3.5–5.3)
PROTHROM AB SERPL-ACNC: 12.8 SEC — SIGNIFICANT CHANGE UP (ref 10.6–13.6)
RBC # BLD: 2.64 M/UL — LOW (ref 3.8–5.2)
RBC # FLD: 15.7 % — HIGH (ref 10.3–14.5)
SODIUM SERPL-SCNC: 133 MMOL/L — LOW (ref 135–145)
VANCOMYCIN TROUGH SERPL-MCNC: 21.2 UG/ML — HIGH (ref 10–20)
WBC # BLD: 7.06 K/UL — SIGNIFICANT CHANGE UP (ref 3.8–10.5)
WBC # FLD AUTO: 7.06 K/UL — SIGNIFICANT CHANGE UP (ref 3.8–10.5)

## 2022-02-09 PROCEDURE — 99233 SBSQ HOSP IP/OBS HIGH 50: CPT

## 2022-02-09 PROCEDURE — 99232 SBSQ HOSP IP/OBS MODERATE 35: CPT

## 2022-02-09 RX ORDER — COLLAGENASE CLOSTRIDIUM HIST. 250 UNIT/G
1 OINTMENT (GRAM) TOPICAL DAILY
Refills: 0 | Status: DISCONTINUED | OUTPATIENT
Start: 2022-02-09 | End: 2022-02-18

## 2022-02-09 RX ORDER — OXYCODONE AND ACETAMINOPHEN 5; 325 MG/1; MG/1
1 TABLET ORAL EVERY 8 HOURS
Refills: 0 | Status: DISCONTINUED | OUTPATIENT
Start: 2022-02-09 | End: 2022-02-16

## 2022-02-09 RX ADMIN — Medication 200 MILLIGRAM(S): at 10:36

## 2022-02-09 RX ADMIN — HEPARIN SODIUM 900 UNIT(S)/HR: 5000 INJECTION INTRAVENOUS; SUBCUTANEOUS at 07:02

## 2022-02-09 RX ADMIN — Medication 1 DROP(S): at 10:40

## 2022-02-09 RX ADMIN — OXYCODONE AND ACETAMINOPHEN 1 TABLET(S): 5; 325 TABLET ORAL at 07:37

## 2022-02-09 RX ADMIN — HEPARIN SODIUM 1300 UNIT(S)/HR: 5000 INJECTION INTRAVENOUS; SUBCUTANEOUS at 14:37

## 2022-02-09 RX ADMIN — Medication 1 APPLICATION(S): at 15:52

## 2022-02-09 RX ADMIN — HEPARIN SODIUM 4000 UNIT(S): 5000 INJECTION INTRAVENOUS; SUBCUTANEOUS at 21:55

## 2022-02-09 RX ADMIN — HEPARIN SODIUM 1500 UNIT(S)/HR: 5000 INJECTION INTRAVENOUS; SUBCUTANEOUS at 21:56

## 2022-02-09 RX ADMIN — HEPARIN SODIUM 1100 UNIT(S)/HR: 5000 INJECTION INTRAVENOUS; SUBCUTANEOUS at 08:07

## 2022-02-09 RX ADMIN — SEVELAMER CARBONATE 800 MILLIGRAM(S): 2400 POWDER, FOR SUSPENSION ORAL at 16:58

## 2022-02-09 RX ADMIN — OXYCODONE AND ACETAMINOPHEN 1 TABLET(S): 5; 325 TABLET ORAL at 20:38

## 2022-02-09 RX ADMIN — HEPARIN SODIUM 1300 UNIT(S)/HR: 5000 INJECTION INTRAVENOUS; SUBCUTANEOUS at 19:36

## 2022-02-09 RX ADMIN — LEVETIRACETAM 500 MILLIGRAM(S): 250 TABLET, FILM COATED ORAL at 10:36

## 2022-02-09 RX ADMIN — Medication 3 MILLIGRAM(S): at 21:49

## 2022-02-09 RX ADMIN — OXYCODONE AND ACETAMINOPHEN 1 TABLET(S): 5; 325 TABLET ORAL at 02:23

## 2022-02-09 RX ADMIN — Medication 650 MILLIGRAM(S): at 16:58

## 2022-02-09 RX ADMIN — SEVELAMER CARBONATE 800 MILLIGRAM(S): 2400 POWDER, FOR SUSPENSION ORAL at 07:38

## 2022-02-09 RX ADMIN — Medication 12.5 MILLIGRAM(S): at 10:38

## 2022-02-09 RX ADMIN — OXYCODONE AND ACETAMINOPHEN 1 TABLET(S): 5; 325 TABLET ORAL at 08:07

## 2022-02-09 RX ADMIN — HEPARIN SODIUM 4000 UNIT(S): 5000 INJECTION INTRAVENOUS; SUBCUTANEOUS at 14:34

## 2022-02-09 RX ADMIN — SEVELAMER CARBONATE 800 MILLIGRAM(S): 2400 POWDER, FOR SUSPENSION ORAL at 12:25

## 2022-02-09 RX ADMIN — ATOVAQUONE 1500 MILLIGRAM(S): 750 SUSPENSION ORAL at 10:35

## 2022-02-09 RX ADMIN — Medication 12.5 MILLIGRAM(S): at 21:43

## 2022-02-09 RX ADMIN — HEPARIN SODIUM 4000 UNIT(S): 5000 INJECTION INTRAVENOUS; SUBCUTANEOUS at 08:06

## 2022-02-09 RX ADMIN — OXYCODONE AND ACETAMINOPHEN 1 TABLET(S): 5; 325 TABLET ORAL at 12:56

## 2022-02-09 RX ADMIN — PANTOPRAZOLE SODIUM 40 MILLIGRAM(S): 20 TABLET, DELAYED RELEASE ORAL at 05:58

## 2022-02-09 RX ADMIN — OXYCODONE AND ACETAMINOPHEN 1 TABLET(S): 5; 325 TABLET ORAL at 12:26

## 2022-02-09 NOTE — PROGRESS NOTE ADULT - ASSESSMENT
A/P: 41 Female with Known MRSA Bacteremia    Plan:  Patient can stay on 3 [facility]  Tachy from the Fever and she get tylenol  Do not Give 30 cc/KG because she is a HD patient and she also does not need a Lactate drawn  She has been getting Vanco based on the levels  To go on a cooling blanket as well    Please recall if she becomes hypotensive     D/W Nursing and staff on [facility]

## 2022-02-09 NOTE — PROGRESS NOTE ADULT - ASSESSMENT
42 yo female w/PMH of ESRD (M/W/F), GERD, bacteremia, seizures, epilepsy, depression, COPD, HTN, heroin abuse, RA, Cdiff colitis in Dec 2021 smoker, recent admission for Aspiration Pna who  presents to [place]  ED via ambulance from dialysis center for brief loss of consciousness, questionable seizure vs syncope.   EMS reported she had a 15 sec generalized seizure and was AAOx4 afterwards.  Pt states she was having shivering and not having a seizure.  She reported that her normal seizures involve her  whole body, unlike today.    She c/o  night sweats last night and chills this morning.  Today she had  1.5 hours of hemodialysis instead of her usual HD.   Pt denies cpain/SOB, no cough or resp complaints, no  abd pain.  She reported having a loose stool and vomiting x1 today and two episodes yesterday.  She has urine once a day and denies dysuria.    #. Seizure?+  - currently she denies seizure  - she does not look postictal  -No more Seizure episodes  -on Keppra     #Sepsis due to MRSA  Bacteremia   - Lactic elevated trended down after Fluids  - had cultures drawn in ER which  positive for MRSA   - gave 1 dose vanco 1 gram on 2/5  - repeated blood cultures today  - will get formal ID consult   - Removed permacath   Echo Limited study to assess left ventricular function.  Moderately reduced left ventricular systolic function.  Estimated left ventricular ejection fraction is 40-45 %.  The left ventricle size is normal with global (diffuse) hypokinesis,  dyskinesis and moderately reduced function.      # IVC Thrombus likely related to Perma cath   Was on Eliquis Starting on Heparin Drip  Due to Possibility of procedure     #  ESRD on hd  - HD , Patient now without acces   - HD per nephro    # Retroperitoneal Adenopathy.   - Oncology evaluated the patient , recommended IR guided Biopsy, As per Dr Herman   Can be done as outpatient.  Patient on ELiquis, with a discal thrombus on the Permacath needing AC   I will hold on IR Guided Biopsy for now       # Rheum arth  - continue plaquenil    # HTN  discontinue lisinopril  - lowered metoprolol to 12.5 bid     #Anemia likely ACD   - Hb stable  - monitor     # Stage 4 Sacral Ulcer present on admission   Frequent turn   Wound care daily , on Collagenase   Wound care Consulted     DVT ppx : heparin     code: FULL   40 yo female w/PMH of ESRD (M/W/F), GERD, bacteremia, seizures, epilepsy, depression, COPD, HTN, heroin abuse, RA, Cdiff colitis in Dec 2021 smoker, recent admission for Aspiration Pna who  presents to [facility]  ED via ambulance from dialysis center for brief loss of consciousness, questionable seizure vs syncope.   EMS reported she had a 15 sec generalized seizure and was AAOx4 afterwards.  Pt states she was having shivering and not having a seizure.  She reported that her normal seizures involve her  whole body, unlike today.    She c/o  night sweats last night and chills this morning.  Today she had  1.5 hours of hemodialysis instead of her usual HD.   Pt denies cpain/SOB, no cough or resp complaints, no  abd pain.  She reported having a loose stool and vomiting x1 today and two episodes yesterday.  She has urine once a day and denies dysuria.    #. Seizure?+  - currently she denies seizure  - she does not look postictal  -No more Seizure episodes  -on Keppra     #Sepsis due to MRSA  Bacteremia   - Lactic elevated trended down after Fluids  - had cultures drawn in ER which  positive for MRSA   - gave 1 dose vanco 1 gram on 2/5  - repeated blood cultures today  - will get formal ID consult   - Removed permacath   Echo Limited study to assess left ventricular function.  Moderately reduced left ventricular systolic function.  Estimated left ventricular ejection fraction is 40-45 %.  The left ventricle size is normal with global (diffuse) hypokinesis,  dyskinesis and moderately reduced function.  02/09 Repeat Blood cx negative up to date , WBC improving, afebrile , on Vancomycin by levels       # IVC Thrombus likely related to Perma cath   Was on Eliquis Starting on Heparin Drip  Due to Possibility of procedure     #  ESRD on hd  - HD , Patient now without acces   - HD per nephro    # Retroperitoneal Adenopathy.   - Oncology evaluated the patient , recommended IR guided Biopsy, As per Dr Herman   Can be done as outpatient.  Patient on ELiquis, with a discal thrombus on the Permacath needing AC   I will hold on IR Guided Biopsy for now       # Rheum arth  - continue plaquenil    # HTN  discontinue lisinopril  - lowered metoprolol to 12.5 bid     #Anemia likely ACD   - Hb stable  - monitor     # Stage 4 Sacral Ulcer present on admission   Frequent turn   Wound care daily , on Collagenase   Wound care Consulted     DVT ppx : heparin     code: FULL

## 2022-02-09 NOTE — PROGRESS NOTE ADULT - SUBJECTIVE AND OBJECTIVE BOX
HPI: patient seen and examined with no family at bedside, patient was sitting in bed eating lunch, denies any pain at this time. other wise comfortable.     PAIN: (x )Yes   ( )No  Level: moderate   Location: shoulder   Intensity:  3-5  /10  Quality: sore   Aggravating Factors: movement   Alleviating Factors: medication and heat pack   Radiation: arm   Duration/Timing: intermittent   Impact on ADLs: using arm     DYSPNEA: ( ) Yes  (x ) No    Review of Systems:    Anxiety- at times   Depression- at times   Physical Discomfort- at times   Dyspnea- no   Constipation- no   Diarrhea- at times   Nausea- no   Vomiting- no   Anorexia- decreased appetite   Weight Loss-  unsure   Cough- no   Secretions- no   Fatigue- yes   Weakness- yes   Delirium- no     All other systems reviewed and negative    PHYSICAL EXAM:    Vital Signs Last 24 Hrs  T(C): 37.4 (2022 08:16), Max: 37.4 (2022 08:16)  T(F): 99.3 (2022 08:16), Max: 99.3 (2022 08:16)  HR: 105 (2022 08:16) (99 - 105)  BP: 145/88 (2022 08:16) (121/76 - 145/88)  RR: 18 (2022 08:16) (18 - 19)  SpO2: 100% (2022 08:16) (99% - 100%)  Daily Weight in k (2022 06:11)    PPSV2: 30  %    General: ill disheveled appearing female in  bed, NAD   Mental Status: alert oriented  HEENT: mmm   Lungs: clear b/l   Cardiac: s1s2 +   GI: non tender, non distended +BS   : no suprapubic tenderness   Ext: no edema multiple areas of scarring   Neuro: weakness       LABS:                        7.7    7.06  )-----------( 219      ( 2022 07:14 )             23.7     02-09    133<L>  |  100  |  20  ----------------------------<  85  3.3<L>   |  24  |  4.98<H>    Ca    8.3<L>      2022 07:22    TPro  6.1  /  Alb  1.9<L>  /  TBili  0.2  /  DBili  x   /  AST  12<L>  /  ALT  12  /  AlkPhos  81      PT/INR - ( 2022 07:22 )   PT: 12.8 sec;   INR: 1.11 ratio         PTT - ( 2022 07:22 )  PTT:30.7 sec    Albumin: Albumin, Serum: 1.9 g/dL ( @ 07:46)    Allergies    morphine (Rash)    Intolerances      MEDICATIONS  (STANDING):  artificial tears (preservative free) Ophthalmic Solution 1 Drop(s) Both EYES two times a day  atovaquone  Suspension 1500 milliGRAM(s) Oral daily  collagenase Ointment 1 Application(s) Topical daily  epoetin amee-epbx (RETACRIT) Injectable 01520 Unit(s) IV Push <User Schedule>  gabapentin 100 milliGRAM(s) Oral three times a day  heparin  Infusion.  Unit(s)/Hr (9 mL/Hr) IV Continuous <Continuous>  hydroxychloroquine 200 milliGRAM(s) Oral daily  levETIRAcetam 250 milliGRAM(s) Oral <User Schedule>  levETIRAcetam 500 milliGRAM(s) Oral daily  metoprolol tartrate 12.5 milliGRAM(s) Oral two times a day  pantoprazole    Tablet 40 milliGRAM(s) Oral before breakfast  sevelamer carbonate 800 milliGRAM(s) Oral three times a day with meals    MEDICATIONS  (PRN):  acetaminophen     Tablet .. 650 milliGRAM(s) Oral every 6 hours PRN Temp greater or equal to 38.5C (101.3F), Mild Pain (1 - 3)  albumin human 25% IVPB 50 milliLiter(s) IV Intermittent every 1 hour PRN SBP less than 100  ALBUTerol    90 MICROgram(s) HFA Inhaler 2 Puff(s) Inhalation every 6 hours PRN Shortness of Breath and/or Wheezing  diphenhydrAMINE 25 milliGRAM(s) Oral every 6 hours PRN Rash and/or Itching  heparin   Injectable 4000 Unit(s) IV Push every 6 hours PRN For aPTT less than 40  heparin   Injectable 2000 Unit(s) IV Push every 6 hours PRN For aPTT between 40 - 57  loperamide 2 milliGRAM(s) Oral every 6 hours PRN Diarrhea  melatonin 3 milliGRAM(s) Oral at bedtime PRN Insomnia  oxycodone    5 mG/acetaminophen 325 mG 1 Tablet(s) Oral every 4 hours PRN Severe Pain (7 - 10)  senna 2 Tablet(s) Oral at bedtime PRN Constipation

## 2022-02-09 NOTE — PROGRESS NOTE ADULT - SUBJECTIVE AND OBJECTIVE BOX
Patient is a 41y Female who is alert and awake  temps noted  pt denies sob or cp    repeat bx pending       MEDICATIONS  (STANDING):  artificial tears (preservative free) Ophthalmic Solution 1 Drop(s) Both EYES two times a day  atovaquone  Suspension 1500 milliGRAM(s) Oral daily  collagenase Ointment 1 Application(s) Topical daily  epoetin amee-epbx (RETACRIT) Injectable 73004 Unit(s) IV Push <User Schedule>  gabapentin 100 milliGRAM(s) Oral three times a day  heparin  Infusion.  Unit(s)/Hr (9 mL/Hr) IV Continuous <Continuous>  hydroxychloroquine 200 milliGRAM(s) Oral daily  levETIRAcetam 250 milliGRAM(s) Oral <User Schedule>  levETIRAcetam 500 milliGRAM(s) Oral daily  metoprolol tartrate 12.5 milliGRAM(s) Oral two times a day  pantoprazole    Tablet 40 milliGRAM(s) Oral before breakfast  sevelamer carbonate 800 milliGRAM(s) Oral three times a day with meals        Vital Signs Last 24 Hrs  T(C): 37.6 (09 Feb 2022 22:06), Max: 40.5 (09 Feb 2022 19:45)  T(F): 99.7 (09 Feb 2022 22:06), Max: 104.9 (09 Feb 2022 19:45)  HR: 136 (09 Feb 2022 19:45) (105 - 155)  BP: 122/75 (09 Feb 2022 22:06) (122/75 - 150/110)  BP(mean): --  RR: 20 (09 Feb 2022 22:06) (18 - 26)  SpO2: 100% (09 Feb 2022 22:06) (99% - 100%)        PHYSICAL EXAM:    Constitutional: frail, >>stated age  HEENT: , EOMI,  MMM  Neck: No LAD, No JVD  Respiratory: dist  Cardiovascular: S1 and S2  LE chronic changes  Neurological: asleep but arousable         LABS:                                   7.7    7.06  )-----------( 219      ( 09 Feb 2022 07:14 )             23.7                         7.7    6.18  )-----------( 185      ( 08 Feb 2022 07:46 )             23.8         133    |  100    |  20     ----------------------------<  85        09 Feb 2022 07:22  3.3     |  24     |  4.98     135    |  101    |  11     ----------------------------<  89        08 Feb 2022 07:46  3.1     |  22     |  3.86     130    |  99     |  18     ----------------------------<  84        07 Feb 2022 07:49  3.1     |  20     |  4.88     Ca    8.3        09 Feb 2022 07:22  Ca    8.3        08 Feb 2022 07:46        TPro  6.1    /  Alb  1.9    /  TBili  0.2    /        08 Feb 2022 07:46  DBili  x      /  AST  12     /  ALT  12     /  AlkPhos  81               Urine Studies:          RADIOLOGY & ADDITIONAL STUDIES:

## 2022-02-09 NOTE — PROGRESS NOTE ADULT - SUBJECTIVE AND OBJECTIVE BOX
Date of service: 02-09-22 @ 10:15    pt seen and examined  feels better; sitting up in bed, nad  no complaints  perm-catheter removed 2/7   afebrile     ROS: no fever or chills; denies dizziness, no HA, no SOB or cough, no abdominal pain, no diarrhea or constipation; no dysuria, no urinary frequency, no legs pain, no rashes    MEDICATIONS  (STANDING):  artificial tears (preservative free) Ophthalmic Solution 1 Drop(s) Both EYES two times a day  atovaquone  Suspension 1500 milliGRAM(s) Oral daily  collagenase Ointment 1 Application(s) Topical daily  epoetin amee-epbx (RETACRIT) Injectable 98192 Unit(s) IV Push <User Schedule>  gabapentin 100 milliGRAM(s) Oral three times a day  heparin  Infusion.  Unit(s)/Hr (9 mL/Hr) IV Continuous <Continuous>  hydroxychloroquine 200 milliGRAM(s) Oral daily  levETIRAcetam 250 milliGRAM(s) Oral <User Schedule>  levETIRAcetam 500 milliGRAM(s) Oral daily  metoprolol tartrate 12.5 milliGRAM(s) Oral two times a day  pantoprazole    Tablet 40 milliGRAM(s) Oral before breakfast  sevelamer carbonate 800 milliGRAM(s) Oral three times a day with meals  silver sulfADIAZINE 1% Cream 1 Application(s) Topical two times a day    Vital Signs Last 24 Hrs  T(C): 37.4 (09 Feb 2022 08:16), Max: 37.4 (09 Feb 2022 08:16)  T(F): 99.3 (09 Feb 2022 08:16), Max: 99.3 (09 Feb 2022 08:16)  HR: 105 (09 Feb 2022 08:16) (99 - 105)  BP: 145/88 (09 Feb 2022 08:16) (121/76 - 145/88)  BP(mean): --  RR: 18 (09 Feb 2022 08:16) (18 - 19)  SpO2: 100% (09 Feb 2022 08:16) (99% - 100%)    PE:  Constitutional: frail looking  HEENT: NC/AT, EOMI, PERRLA, conjunctivae clear; ears and nose atraumatic; pharynx benign  Neck: supple; thyroid not palpable  Back: no tenderness  Respiratory: respiratory effort normal; clear to auscultation  Cardiovascular: S1S2 regular, no murmurs  Abdomen: soft, not tender, not distended, positive BS; liver and spleen WNL  Genitourinary: no suprapubic tenderness  Lymphatic: no LN palpable  Musculoskeletal: no muscle tenderness, no joint swelling or tenderness  Extremities: no pedal edema  Neurological/ Psychiatric: AxOx3, Judgement and insight normal;  moving all extremities  Skin: no rashes; no palpable lesions perm cath in place, stage IV sacral ulcer     Labs: all available labs reviewed                        7.7    7.06  )-----------( 219      ( 09 Feb 2022 07:14 )             23.7     02-09    133<L>  |  100  |  20  ----------------------------<  85  3.3<L>   |  24  |  4.98<H>    Ca    8.3<L>      09 Feb 2022 07:22    TPro  6.1  /  Alb  1.9<L>  /  TBili  0.2  /  DBili  x   /  AST  12<L>  /  ALT  12  /  AlkPhos  81  02-08       Vancomycin Level, Trough: 27.6 ug/mL (02-08 @ 19:21)    Culture - Blood (02.04.22 @ 14:06)   - Methicillin resistant Staphylococcus aureus (MRSA): Detec   Gram Stain:   Growth in aerobic bottle: Gram Positive Cocci in Clusters   Specimen Source: .Blood Blood-Peripheral   Organism: Blood Culture PCR   Culture Results:   Growth in aerobic bottle: Gram Positive Cocci in Clusters   ***Blood Panel PCR results on this specimen are available   approximately 3 hours after the Gram stain result.***   Gram stain, PCR, and/or culture results may not always   correspond due to difference in methodologies.       Radiology: all available radiological tests reviewed    *** ADDENDUM***    Questionable mild hyperenhancement of the urethra. Correlate with   urinalysis.    Findings were discussed with Dr. Epperson at 4:37 pm on 2/4/2022.    --- End of Report ---    *** END OF ADDENDUM***      PROCEDURE DATE:  02/04/2022          INTERPRETATION:  CLINICAL INFORMATION: Infected decubitus ulcer    COMPARISON: 1/21/2022, 1/18/2022.    CONTRAST/COMPLICATIONS:  IV Contrast: Omnipaque 350  90 cc administered   10 cc discarded  Oral Contrast: NONE  Complications: None reported at time of study completion    PROCEDURE:  CT of the Abdomen and Pelvis was performed.  Sagittal and coronal reformats were performed.    FINDINGS:  LOWER CHEST: Within normal limits.    LIVER: There is thrombus surrounding the catheter tip in the intrahepatic   IVC.  BILE DUCTS: Normal caliber.  GALLBLADDER: Cholecystectomy.  SPLEEN: Borderline enlarged.  PANCREAS: Within normal limits.  ADRENALS: Within normal limits.  KIDNEYS/URETERS: Within normal limits.    BLADDER: Within normal limits. Question mild hyperenhancement of the   urethra.  REPRODUCTIVE ORGANS: Uterus and adnexa within normal limits.    BOWEL: No bowel obstruction. Large amount of fluid/stool throughout the   colon. Question mild thickening versus underdistention of the sigmoid   colon. Appendix is not visualized. No evidence of inflammation in the   pericecal region.  PERITONEUM: No ascites.  VESSELS: Within the intrahepatic IVC, as described above.  RETROPERITONEUM/LYMPH NODES: Multiple enlarged retroperitoneal nodes. . A   left para-aortic node measures 1.9 x 1.4 cm (2:54). A left common iliac   node measures 2.4 x 1.3 cm (2:75)  ABDOMINAL WALL: Left sacral decubitus ulcer without definite evidence of   osseous erosion.  BONES: Degenerative changes. Degenerative changes of the right hip.    IMPRESSION:  There is thrombus surrounding the catheter tip in the intrahepatic IVC.    Retroperitoneal adenopathy, as described above. Differential includes   lymphoma.    Mildly distended, fluid-filled colon. Mild thickening versus   underdistention of the sigmoid colon.    Left sacral decubitus ulcer without definite osseous erosion. Correlate   with MRI if clinically warranted.    Additional findings as above.      ***Please see the addendum at the top of this report. It may contain   additional important information or changes.****      < end of copied text >    Advanced directives addressed: full resuscitation

## 2022-02-09 NOTE — PROGRESS NOTE ADULT - ASSESSMENT
Pt is a 41y old Female with hx of ESRD (M/W/F), GERD, bacteremia, seizures, epilepsy, depression, COPD, HTN, heroin abuse, RA, Cdiff colitis in Dec 2021 smoker, recent admission for Aspiration Pna who  presents to [place]  ED via ambulance from dialysis center for brief loss of consciousness, questionable seizure vs syncope.   EMS reported she had a 15 sec generalized seizure and was AAOx4 afterwards.  Pt states she was having shivering and not having a seizure.  She reported that her normal seizures involve her  whole body, unlike today.    She c/o  night sweats last night and chills this morning.  Today she had  1.5 hours of hemodialysis instead of her usual HD.   Pt denies cpain/SOB, no cough or resp complaints, no  abd pain.  She reported having a loose stool and vomiting x1 today and two episodes yesterday.  She has urine once a day and denies dysuria. Palliative medicine consulted to help establish goc and advance care planning    1) Bacteremia   - had cultures positive for gram positive cocci in clusters  - repeat blood cultures   - ID consult appreciated   - c/w vanco as per ID   - plan for catheter removal     2) Sacral wound   - severe protein calorie malnutrition   - wound care nurse consult   - Albumin, Serum: 2.4 g/dL  - turn and position   - encourage OOB     3) ESRD   - HD patient   - nephrology consult appreciated   - avoid nephrotic medications   - now with clot near IVC    4) history of anemia  - Hb stable  - continue Eliquis  - monitor labs   - oncology consult appreciated     5) Retroperitoneal Adenopathy   - CT abdomen Pelvis Retroperitoneal adenopathy, as described above. Differential includes   lymphoma.  - ?possible IR biopsy - as per primary medical team, due to AC at this time patient can follow up outpatient   - oncology consult appreciated     5) Advance care planning   - patient has capacity to make decisions   - would like to have all interventions periods of forgetfulness   - new  HCP on patients chart naming her Aunt Gregoria, patient also stated that her good friend (who she refers to as her brother) brittany is who she lives with and is involved with her care.   - will sign off at this time as patient goals are clear and symptoms are managed discussed with Dr. Holland  Pt is a 41y old Female with hx of ESRD (M/W/F), GERD, bacteremia, seizures, epilepsy, depression, COPD, HTN, heroin abuse, RA, Cdiff colitis in Dec 2021 smoker, recent admission for Aspiration Pna who  presents to [place]  ED via ambulance from dialysis center for brief loss of consciousness, questionable seizure vs syncope.   EMS reported she had a 15 sec generalized seizure and was AAOx4 afterwards.  Pt states she was having shivering and not having a seizure.  She reported that her normal seizures involve her  whole body, unlike today.    She c/o  night sweats last night and chills this morning.  Today she had  1.5 hours of hemodialysis instead of her usual HD.   Pt denies cpain/SOB, no cough or resp complaints, no  abd pain.  She reported having a loose stool and vomiting x1 today and two episodes yesterday.  She has urine once a day and denies dysuria. Palliative medicine consulted to help establish goc and advance care planning    1) Bacteremia   - had cultures positive for gram positive cocci in clusters  - repeat blood cultures   - ID consult appreciated   - c/w vanco as per ID   - plan for catheter removal     2) Sacral wound   - severe protein calorie malnutrition   - wound care nurse consult   - Albumin, Serum: 2.4 g/dL  - turn and position   - encourage OOB     3) ESRD   - HD patient   - nephrology consult appreciated   - avoid nephrotic medications   - now with clot near IVC    4) history of anemia  - Hb stable  - continue Eliquis  - monitor labs   - oncology consult appreciated     5) Retroperitoneal Adenopathy   - CT abdomen Pelvis Retroperitoneal adenopathy, as described above. Differential includes   lymphoma.  - ?possible IR biopsy - as per primary medical team, due to AC at this time patient can follow up outpatient   - oncology consult appreciated     5) Advance care planning   - patient has capacity to make decisions periods of forgetfulness (patient describes as Amnesia)  - would like to have all interventions - Full Code   - new  HCP on patients chart naming her Aunt Gregoria, patient also stated that her good friend (who she refers to as her brother) brittany is who she lives with and is involved with her care.   - will sign off at this time as patient goals are clear and symptoms are managed discussed with Dr. Holland

## 2022-02-09 NOTE — PROGRESS NOTE ADULT - SUBJECTIVE AND OBJECTIVE BOX
CC: Called to a CODE Sepsis on 3 [facility]    HPI:  Pt is a pleasant 40 yo female w/PMH of ESRD who presents again with MRSA Bacteremia.  Tonight she has a temp to 103 and tachycardiac         PAST MEDICAL & SURGICAL HISTORY:  Rheumatoid arthritis    H/O Raynaud&#x27;s syndrome    Heroin abuse    Smoker    Sepsis    HTN (hypertension)    COPD (chronic obstructive pulmonary disease)    Depression    Epilepsy    GERD (gastroesophageal reflux disease)    Bacteremia    Systemic lupus erythematosus    Aphonia    H/O tubal ligation    H/O appendicitis    Gall bladder stones    H/O tracheostomy    S/P percutaneous endoscopic gastrostomy (PEG) tube placement        FAMILY HISTORY:  Family history of cardiomyopathy (Mother)        Social Hx:    Allergies    morphine (Rash)    Intolerances          Weight (kg): 49.7 (02-08 @ 19:40)    ICU Vital Signs Last 24 Hrs  T(C): 37.4 (09 Feb 2022 08:16), Max: 37.4 (09 Feb 2022 08:16)  T(F): 99.3 (09 Feb 2022 08:16), Max: 99.3 (09 Feb 2022 08:16)  HR: 105 (09 Feb 2022 08:16) (99 - 105)  BP: 145/88 (09 Feb 2022 08:16) (121/76 - 145/88)  BP(mean): --  ABP: --  ABP(mean): --  RR: 18 (09 Feb 2022 08:16) (18 - 19)  SpO2: 100% (09 Feb 2022 08:16) (99% - 100%)          I&O's Summary    08 Feb 2022 07:01  -  09 Feb 2022 07:00  --------------------------------------------------------  IN: 480 mL / OUT: 0 mL / NET: 480 mL    09 Feb 2022 07:01  -  09 Feb 2022 18:28  --------------------------------------------------------  IN: 480 mL / OUT: 0 mL / NET: 480 mL                              7.7    7.06  )-----------( 219      ( 09 Feb 2022 07:14 )             23.7       02-09    133<L>  |  100  |  20  ----------------------------<  85  3.3<L>   |  24  |  4.98<H>    Ca    8.3<L>      09 Feb 2022 07:22    TPro  6.1  /  Alb  1.9<L>  /  TBili  0.2  /  DBili  x   /  AST  12<L>  /  ALT  12  /  AlkPhos  81  02-08                    MEDICATIONS  (STANDING):  artificial tears (preservative free) Ophthalmic Solution 1 Drop(s) Both EYES two times a day  atovaquone  Suspension 1500 milliGRAM(s) Oral daily  collagenase Ointment 1 Application(s) Topical daily  epoetin amee-epbx (RETACRIT) Injectable 43673 Unit(s) IV Push <User Schedule>  gabapentin 100 milliGRAM(s) Oral three times a day  heparin  Infusion.  Unit(s)/Hr (9 mL/Hr) IV Continuous <Continuous>  hydroxychloroquine 200 milliGRAM(s) Oral daily  levETIRAcetam 250 milliGRAM(s) Oral <User Schedule>  levETIRAcetam 500 milliGRAM(s) Oral daily  metoprolol tartrate 12.5 milliGRAM(s) Oral two times a day  pantoprazole    Tablet 40 milliGRAM(s) Oral before breakfast  sevelamer carbonate 800 milliGRAM(s) Oral three times a day with meals    MEDICATIONS  (PRN):  acetaminophen     Tablet .. 650 milliGRAM(s) Oral every 6 hours PRN Temp greater or equal to 38.5C (101.3F), Mild Pain (1 - 3)  albumin human 25% IVPB 50 milliLiter(s) IV Intermittent every 1 hour PRN SBP less than 100  ALBUTerol    90 MICROgram(s) HFA Inhaler 2 Puff(s) Inhalation every 6 hours PRN Shortness of Breath and/or Wheezing  diphenhydrAMINE 25 milliGRAM(s) Oral every 6 hours PRN Rash and/or Itching  heparin   Injectable 4000 Unit(s) IV Push every 6 hours PRN For aPTT less than 40  heparin   Injectable 2000 Unit(s) IV Push every 6 hours PRN For aPTT between 40 - 57  loperamide 2 milliGRAM(s) Oral every 6 hours PRN Diarrhea  melatonin 3 milliGRAM(s) Oral at bedtime PRN Insomnia  oxycodone    5 mG/acetaminophen 325 mG 1 Tablet(s) Oral every 8 hours PRN Severe Pain (7 - 10)  senna 2 Tablet(s) Oral at bedtime PRN Constipation      DVT Prophylaxis: UFH    Advanced Directives:  Discussed with:    Visit Information:    ** Time is exclusive of billed procedures and/or teaching and/or routine family updates.

## 2022-02-09 NOTE — PROGRESS NOTE ADULT - SUBJECTIVE AND OBJECTIVE BOX
Patient was seen and evaluated at bedside. No acute events overnight. Pt offers no new complaints at this time. Heparin drip started for IVC thrombus. VS reviewed.    Vital Signs Last 24 Hrs  T(C): 37.4 (09 Feb 2022 08:16), Max: 37.4 (09 Feb 2022 08:16)  T(F): 99.3 (09 Feb 2022 08:16), Max: 99.3 (09 Feb 2022 08:16)  HR: 105 (09 Feb 2022 08:16) (98 - 105)  BP: 145/88 (09 Feb 2022 08:16) (121/76 - 149/101)  BP(mean): --  RR: 18 (09 Feb 2022 08:16) (18 - 19)  SpO2: 100% (09 Feb 2022 08:16) (99% - 100%)                          7.7    7.06  )-----------( 219      ( 09 Feb 2022 07:14 )             23.7     CBC Full  -  ( 09 Feb 2022 07:14 )  WBC Count : 7.06 K/uL  RBC Count : 2.64 M/uL  Hemoglobin : 7.7 g/dL  Hematocrit : 23.7 %  Platelet Count - Automated : 219 K/uL  Mean Cell Volume : 89.8 fl  Mean Cell Hemoglobin : 29.2 pg  Mean Cell Hemoglobin Concentration : 32.5 gm/dL  Auto Neutrophil # : x  Auto Lymphocyte # : x  Auto Monocyte # : x  Auto Eosinophil # : x  Auto Basophil # : x  Auto Neutrophil % : x  Auto Lymphocyte % : x  Auto Monocyte % : x  Auto Eosinophil % : x  Auto Basophil % : x    02-09    133<L>  |  100  |  20  ----------------------------<  85  3.3<L>   |  24  |  4.98<H>    Ca    8.3<L>      09 Feb 2022 07:22    TPro  6.1  /  Alb  1.9<L>  /  TBili  0.2  /  DBili  x   /  AST  12<L>  /  ALT  12  /  AlkPhos  81  02-08    LIVER FUNCTIONS - ( 08 Feb 2022 07:46 )  Alb: 1.9 g/dL / Pro: 6.1 gm/dL / ALK PHOS: 81 U/L / ALT: 12 U/L / AST: 12 U/L / GGT: x           PT/INR - ( 09 Feb 2022 07:22 )   PT: 12.8 sec;   INR: 1.11 ratio         PTT - ( 09 Feb 2022 07:22 )  PTT:30.7 sec          Physical Exam:  General: AAOx3, in NAD  HEENT: NC/AT, EOMI, poor dentition  Cardio: S1S2, tachycardic  Pulm: equal air entry b/l, non labored  Chest: dressing over right chest c/d/i  Abdomen: soft, NT/ND  Extremities: multiple excoriations over all extremities

## 2022-02-09 NOTE — PROGRESS NOTE ADULT - ASSESSMENT
40 yo female w/PMH of ESRD (M/W/F), GERD, bacteremia, seizures, epilepsy, depression, COPD, HTN, heroin abuse, RA, Cdiff colitis in Dec 2021 smoker, recent admission for Aspiration Pna who  presents to [place]  ED via ambulance from dialysis center for brief loss of consciousness, questionable seizure vs syncope.   EMS reported she had a 15 sec generalized seizure and was AAOx4 afterwards.  Pt states she was having shivering and not having a seizure.  She reported that her normal seizures involve her  whole body, unlike today.    She c/o  night sweats last night and chills this morning.  Day of admit she had  1.5 hours of hemodialysis instead of her usual HD.   Pt denies cpain/SOB, no cough or resp complaints, no  abd pain.  She reported having a loose stool and vomiting x1  and two episodes day prior to admit. She has urine once a day and denies dysuria. Here noted with MRSA in blood cx. Has perm-catheter in place. Hx of substance abuse.    1. MRSA sepsis/bacteremia. source ? catheter ? sacral decub wound?  hx substance abuse. ESRD  - s/p perm-catheter removal 2/7   - s/p vancomycin x 2 day #5 of antibiotics level high yesterday   - f/u level today and re-dose if less than 20   - repeat blood cx 2/6 no growth f/u blood cx from 2/8  - TTE no obvious vegetations   - wound care eval for sacral decub - wound clean  - vascular following, plan for AVF creations vs AV graft  - monitor temps  - tolerating abx well so far; no side effects noted  - reason for abx use and side effects reviewed with patient  - supportive care  - fu cbc    2. other issues - care per medicine  42 yo female w/PMH of ESRD (M/W/F), GERD, bacteremia, seizures, epilepsy, depression, COPD, HTN, heroin abuse, RA, Cdiff colitis in Dec 2021 smoker, recent admission for Aspiration Pna who  presents to [place]  ED via ambulance from dialysis center for brief loss of consciousness, questionable seizure vs syncope.   EMS reported she had a 15 sec generalized seizure and was AAOx4 afterwards.  Pt states she was having shivering and not having a seizure.  She reported that her normal seizures involve her  whole body, unlike today.    She c/o  night sweats last night and chills this morning.  Day of admit she had  1.5 hours of hemodialysis instead of her usual HD.   Pt denies cpain/SOB, no cough or resp complaints, no  abd pain.  She reported having a loose stool and vomiting x1  and two episodes day prior to admit. She has urine once a day and denies dysuria. Here noted with MRSA in blood cx. Has perm-catheter in place. Hx of substance abuse.    1. MRSA sepsis/bacteremia. source ? catheter ? sacral decub wound?  hx substance abuse. ESRD  - s/p perm-catheter removal 2/7   - s/p vancomycin x 2 day #5 of antibiotics level high yesterday   - f/u level today and re-dose if less than 20   - repeat blood cx 2/6 no growth f/u blood cx from 2/8  - TTE no obvious vegetations   - wound care eval for sacral decub - wound clean  - vascular following, plan for AVF creations vs AV graft  - suggest blood cx clearance for 48-72 hours prior to new line placement  - monitor temps  - tolerating abx well so far; no side effects noted  - reason for abx use and side effects reviewed with patient  - supportive care  - fu cbc    2. other issues - care per medicine

## 2022-02-09 NOTE — PROGRESS NOTE ADULT - ASSESSMENT
40 yo F with ESRD on HD, presents with bacteremia s/p RIJ PC removal, in need of HD access.    Plan:  -f/u blood cultures - 2/4 MRSA, 2/6 NGTD  -recommend another blood culture  -ID on board, recs appreciated  -c/w IV abx  -nephro on board, recs appreciated  -will need temporary HD access in the interim  -will plan for a right AVF creation vs right AVG with biologic on this admission once blood cultures negative  -continue to save right arm - no BP cuffs, IVs or blood draws  -medical management as per primary team    Plan discussed with Dr. Lynch

## 2022-02-09 NOTE — PROGRESS NOTE ADULT - ASSESSMENT
42 yo female w/PMH of ESRD (M/W/F), GERD, bacteremia, seizures, epilepsy, depression, COPD, HTN, heroin abuse, RA, Cdiff colitis in Dec 2021 smoker, recent admission for Aspiration Pna who  presents to [place]  ED via ambulance from dialysis center for brief loss of consciousness, questionable seizure vs syncope.        ESRD on HD   No acute need for HD, last on 2/7. Catheter out   MRSA bacteremia   Vascular following for access,    line holiday = will need to reinsert permacath or temp HD catheter as per ID reccs later this week    monitor labs daily        HTN  -oral meds    MRSA bacteremia   IV abx per ID     Anemia   TAMARA at HD   PRBC as needed     * pt seen earlier today      d.w Dr Holland   d/w Dr Ugalde

## 2022-02-09 NOTE — PROGRESS NOTE ADULT - SUBJECTIVE AND OBJECTIVE BOX
2/5 : Patient is well known to me she was just discharged 2 days ago after a long stay . She does not look post ictal. She does not look septic. She is noted to have clot by her HD cath tip. She is on eliquis. I have discontinued her ABX     2/6 : late last night patients blood cutlures x 1 were positive . I gave her a dose of vanco yesterday. Will repeat blood cultures and get vanco trough today. I have placed official ID consult . She is laying in bed comfortably with no complaints . I had long conversation again with her about life and death and she understands how poor her prognosis is   02/08 Afebrile , c/o right neck pain where Catheter was removed   02/09 Afebrile as per Nurse patient requesting Narcotic around the Clock, given the hisotry of abuse opioids .     Vital Signs Last 24 Hrs  Vital Signs Last 24 Hrs  T(C): 37.4 (09 Feb 2022 08:16), Max: 37.4 (09 Feb 2022 08:16)  T(F): 99.3 (09 Feb 2022 08:16), Max: 99.3 (09 Feb 2022 08:16)  HR: 105 (09 Feb 2022 08:16) (99 - 105)  BP: 145/88 (09 Feb 2022 08:16) (121/76 - 145/88)  BP(mean): --  RR: 18 (09 Feb 2022 08:16) (18 - 19)  SpO2: 100% (09 Feb 2022 08:16) (99% - 100%)  # General NAD   CONSTITUTIONAL: Well groomed, no apparent distress  EYES: PERRLA and symmetric, EOMI, No conjunctival or scleral injection, non-icteric  ENMT: Oral mucosa with moist membranes. No external nasal lesions; nasal mucosa not inflamed; normal dentition;  NECK: Supple, symmetric and without tracheal deviation;   RESPIRATORY:CTA b/l, no wheezes, rales or rhonch  CARDIOVASCULAR: RRRR, +S1S2, no murmurs, no rubs, no gallops; no JVD; no peripheral edema  GASTROINTESTINAL: Soft, non tender, non distended, no rebound, no guarding; No palpable masses; no hepatosplenomegaly; no hernia palpated;  LYMPHATIC: No cervical LAD or tenderness; no axillary LAD or tenderness; no inguinal LAD or tenderness  MUSCULOSKELETAL: Normal gait and station; no digital clubbing or cyanosis; e  SKIN: No rashes or ulcers noted; no subcutaneous nodules or induration palpable  NEUROLOGIC: Alert, awake CN II-XII intact; normal reflexes in upper and lower extremities, sensation intact in upper and lower extremities b/l to light touch; PSYCHIATRIC: Appropriate insight/judgment; A+O x 3, mood and affect appropriate, recent/remote memory intact  MEDICATIONS  (STANDING):  artificial tears (preservative free) Ophthalmic Solution 1 Drop(s) Both EYES two times a day  atovaquone  Suspension 1500 milliGRAM(s) Oral daily  collagenase Ointment 1 Application(s) Topical daily  epoetin amee-epbx (RETACRIT) Injectable 15186 Unit(s) IV Push <User Schedule>  gabapentin 100 milliGRAM(s) Oral three times a day  heparin  Infusion.  Unit(s)/Hr (9 mL/Hr) IV Continuous <Continuous>  hydroxychloroquine 200 milliGRAM(s) Oral daily  levETIRAcetam 250 milliGRAM(s) Oral <User Schedule>  levETIRAcetam 500 milliGRAM(s) Oral daily  metoprolol tartrate 12.5 milliGRAM(s) Oral two times a day  pantoprazole    Tablet 40 milliGRAM(s) Oral before breakfast  sevelamer carbonate 800 milliGRAM(s) Oral three times a day with meals    MEDICATIONS  (PRN):  acetaminophen     Tablet .. 650 milliGRAM(s) Oral every 6 hours PRN Temp greater or equal to 38.5C (101.3F), Mild Pain (1 - 3)  albumin human 25% IVPB 50 milliLiter(s) IV Intermittent every 1 hour PRN SBP less than 100  ALBUTerol    90 MICROgram(s) HFA Inhaler 2 Puff(s) Inhalation every 6 hours PRN Shortness of Breath and/or Wheezing  diphenhydrAMINE 25 milliGRAM(s) Oral every 6 hours PRN Rash and/or Itching  heparin   Injectable 4000 Unit(s) IV Push every 6 hours PRN For aPTT less than 40  heparin   Injectable 2000 Unit(s) IV Push every 6 hours PRN For aPTT between 40 - 57  loperamide 2 milliGRAM(s) Oral every 6 hours PRN Diarrhea  melatonin 3 milliGRAM(s) Oral at bedtime PRN Insomnia  oxycodone    5 mG/acetaminophen 325 mG 1 Tablet(s) Oral every 8 hours PRN Severe Pain (7 - 10)  senna 2 Tablet(s) Oral at bedtime PRN Constipation      # Limited study to assess left ventricular function.   Moderately reduced left ventricular systolic function.   Estimated left ventricular ejection fraction is 40-45 %.   The left ventricle size is normal with global (diffuse) hypokinesis,   dyskinesis and moderately reduced function.

## 2022-02-10 ENCOUNTER — APPOINTMENT (OUTPATIENT)
Dept: CARDIOLOGY | Facility: CLINIC | Age: 42
End: 2022-02-10

## 2022-02-10 LAB
ALBUMIN SERPL ELPH-MCNC: 1.8 G/DL — LOW (ref 3.3–5)
ALP SERPL-CCNC: 76 U/L — SIGNIFICANT CHANGE UP (ref 40–120)
ALT FLD-CCNC: 10 U/L — LOW (ref 12–78)
ANION GAP SERPL CALC-SCNC: 11 MMOL/L — SIGNIFICANT CHANGE UP (ref 5–17)
ANISOCYTOSIS BLD QL: SLIGHT — SIGNIFICANT CHANGE UP
APTT BLD: 75.8 SEC — HIGH (ref 27.5–35.5)
AST SERPL-CCNC: 10 U/L — LOW (ref 15–37)
BASOPHILS # BLD AUTO: 0 K/UL — SIGNIFICANT CHANGE UP (ref 0–0.2)
BASOPHILS NFR BLD AUTO: 0 % — SIGNIFICANT CHANGE UP (ref 0–2)
BILIRUB SERPL-MCNC: 0.3 MG/DL — SIGNIFICANT CHANGE UP (ref 0.2–1.2)
BUN SERPL-MCNC: 25 MG/DL — HIGH (ref 7–23)
CALCIUM SERPL-MCNC: 8.1 MG/DL — LOW (ref 8.5–10.1)
CHLORIDE SERPL-SCNC: 98 MMOL/L — SIGNIFICANT CHANGE UP (ref 96–108)
CO2 SERPL-SCNC: 21 MMOL/L — LOW (ref 22–31)
CREAT SERPL-MCNC: 5.58 MG/DL — HIGH (ref 0.5–1.3)
EOSINOPHIL # BLD AUTO: 0.16 K/UL — SIGNIFICANT CHANGE UP (ref 0–0.5)
EOSINOPHIL NFR BLD AUTO: 2 % — SIGNIFICANT CHANGE UP (ref 0–6)
GLUCOSE SERPL-MCNC: 89 MG/DL — SIGNIFICANT CHANGE UP (ref 70–99)
HCT VFR BLD CALC: 25.3 % — LOW (ref 34.5–45)
HGB BLD-MCNC: 8 G/DL — LOW (ref 11.5–15.5)
HYPOCHROMIA BLD QL: SLIGHT — SIGNIFICANT CHANGE UP
LYMPHOCYTES # BLD AUTO: 0.89 K/UL — LOW (ref 1–3.3)
LYMPHOCYTES # BLD AUTO: 11 % — LOW (ref 13–44)
MANUAL SMEAR VERIFICATION: SIGNIFICANT CHANGE UP
MCHC RBC-ENTMCNC: 28.4 PG — SIGNIFICANT CHANGE UP (ref 27–34)
MCHC RBC-ENTMCNC: 31.6 GM/DL — LOW (ref 32–36)
MCV RBC AUTO: 89.7 FL — SIGNIFICANT CHANGE UP (ref 80–100)
MONOCYTES # BLD AUTO: 0.81 K/UL — SIGNIFICANT CHANGE UP (ref 0–0.9)
MONOCYTES NFR BLD AUTO: 10 % — SIGNIFICANT CHANGE UP (ref 2–14)
NEUTROPHILS # BLD AUTO: 6.16 K/UL — SIGNIFICANT CHANGE UP (ref 1.8–7.4)
NEUTROPHILS NFR BLD AUTO: 75 % — SIGNIFICANT CHANGE UP (ref 43–77)
NEUTS BAND # BLD: 1 % — SIGNIFICANT CHANGE UP (ref 0–8)
NRBC # BLD: 0 /100 — SIGNIFICANT CHANGE UP (ref 0–0)
NRBC # BLD: SIGNIFICANT CHANGE UP /100 WBCS (ref 0–0)
PLAT MORPH BLD: NORMAL — SIGNIFICANT CHANGE UP
PLATELET # BLD AUTO: 248 K/UL — SIGNIFICANT CHANGE UP (ref 150–400)
POTASSIUM SERPL-MCNC: 3.5 MMOL/L — SIGNIFICANT CHANGE UP (ref 3.5–5.3)
POTASSIUM SERPL-SCNC: 3.5 MMOL/L — SIGNIFICANT CHANGE UP (ref 3.5–5.3)
PROMYELOCYTES # FLD: 1 % — HIGH (ref 0–0)
PROT SERPL-MCNC: 5.9 GM/DL — LOW (ref 6–8.3)
RBC # BLD: 2.82 M/UL — LOW (ref 3.8–5.2)
RBC # FLD: 16.3 % — HIGH (ref 10.3–14.5)
RBC BLD AUTO: ABNORMAL
SARS-COV-2 RNA SPEC QL NAA+PROBE: SIGNIFICANT CHANGE UP
SODIUM SERPL-SCNC: 130 MMOL/L — LOW (ref 135–145)
VANCOMYCIN TROUGH SERPL-MCNC: 19.9 UG/ML — SIGNIFICANT CHANGE UP (ref 10–20)
WBC # BLD: 8.1 K/UL — SIGNIFICANT CHANGE UP (ref 3.8–10.5)
WBC # FLD AUTO: 8.1 K/UL — SIGNIFICANT CHANGE UP (ref 3.8–10.5)

## 2022-02-10 PROCEDURE — 99231 SBSQ HOSP IP/OBS SF/LOW 25: CPT | Mod: GC

## 2022-02-10 PROCEDURE — 99232 SBSQ HOSP IP/OBS MODERATE 35: CPT

## 2022-02-10 RX ADMIN — Medication 650 MILLIGRAM(S): at 20:14

## 2022-02-10 RX ADMIN — Medication 12.5 MILLIGRAM(S): at 08:42

## 2022-02-10 RX ADMIN — OXYCODONE AND ACETAMINOPHEN 1 TABLET(S): 5; 325 TABLET ORAL at 14:06

## 2022-02-10 RX ADMIN — ATOVAQUONE 1500 MILLIGRAM(S): 750 SUSPENSION ORAL at 08:43

## 2022-02-10 RX ADMIN — OXYCODONE AND ACETAMINOPHEN 1 TABLET(S): 5; 325 TABLET ORAL at 22:30

## 2022-02-10 RX ADMIN — OXYCODONE AND ACETAMINOPHEN 1 TABLET(S): 5; 325 TABLET ORAL at 23:00

## 2022-02-10 RX ADMIN — LEVETIRACETAM 500 MILLIGRAM(S): 250 TABLET, FILM COATED ORAL at 08:41

## 2022-02-10 RX ADMIN — Medication 1 APPLICATION(S): at 08:44

## 2022-02-10 RX ADMIN — Medication 650 MILLIGRAM(S): at 13:35

## 2022-02-10 RX ADMIN — Medication 650 MILLIGRAM(S): at 23:52

## 2022-02-10 RX ADMIN — SEVELAMER CARBONATE 800 MILLIGRAM(S): 2400 POWDER, FOR SUSPENSION ORAL at 13:11

## 2022-02-10 RX ADMIN — Medication 650 MILLIGRAM(S): at 12:35

## 2022-02-10 RX ADMIN — OXYCODONE AND ACETAMINOPHEN 1 TABLET(S): 5; 325 TABLET ORAL at 15:06

## 2022-02-10 RX ADMIN — SEVELAMER CARBONATE 800 MILLIGRAM(S): 2400 POWDER, FOR SUSPENSION ORAL at 17:31

## 2022-02-10 RX ADMIN — HEPARIN SODIUM 0 UNIT(S)/HR: 5000 INJECTION INTRAVENOUS; SUBCUTANEOUS at 17:30

## 2022-02-10 RX ADMIN — SEVELAMER CARBONATE 800 MILLIGRAM(S): 2400 POWDER, FOR SUSPENSION ORAL at 08:41

## 2022-02-10 RX ADMIN — GABAPENTIN 100 MILLIGRAM(S): 400 CAPSULE ORAL at 05:54

## 2022-02-10 RX ADMIN — HEPARIN SODIUM 1500 UNIT(S)/HR: 5000 INJECTION INTRAVENOUS; SUBCUTANEOUS at 16:55

## 2022-02-10 RX ADMIN — Medication 1 DROP(S): at 08:42

## 2022-02-10 RX ADMIN — HEPARIN SODIUM 1500 UNIT(S)/HR: 5000 INJECTION INTRAVENOUS; SUBCUTANEOUS at 05:51

## 2022-02-10 RX ADMIN — Medication 3 MILLIGRAM(S): at 22:30

## 2022-02-10 RX ADMIN — HEPARIN SODIUM 1400 UNIT(S)/HR: 5000 INJECTION INTRAVENOUS; SUBCUTANEOUS at 18:38

## 2022-02-10 RX ADMIN — Medication 12.5 MILLIGRAM(S): at 22:30

## 2022-02-10 RX ADMIN — Medication 200 MILLIGRAM(S): at 08:41

## 2022-02-10 RX ADMIN — Medication 1 DROP(S): at 23:24

## 2022-02-10 RX ADMIN — HEPARIN SODIUM 1400 UNIT(S)/HR: 5000 INJECTION INTRAVENOUS; SUBCUTANEOUS at 19:20

## 2022-02-10 RX ADMIN — PANTOPRAZOLE SODIUM 40 MILLIGRAM(S): 20 TABLET, DELAYED RELEASE ORAL at 05:57

## 2022-02-10 RX ADMIN — GABAPENTIN 100 MILLIGRAM(S): 400 CAPSULE ORAL at 22:30

## 2022-02-10 RX ADMIN — HEPARIN SODIUM 1500 UNIT(S)/HR: 5000 INJECTION INTRAVENOUS; SUBCUTANEOUS at 07:25

## 2022-02-10 RX ADMIN — OXYCODONE AND ACETAMINOPHEN 1 TABLET(S): 5; 325 TABLET ORAL at 06:10

## 2022-02-10 NOTE — PROGRESS NOTE ADULT - ASSESSMENT
42 yo F with ESRD on HD, presents with bacteremia s/p RIJ PC removal, in need of HD access.    Plan:  -f/u blood cultures - 2/4 MRSA, 2/6 NGTD, Will follow up final blood cultures prelim negative   -please repeat blood cultures when patient has next fever   -ID on board, recs appreciated  -c/w IV abx  -nephro on board, recs appreciated  -will need temporary HD access in the interim  -will plan for a right AVF creation vs right AVG with biologic on this admission once blood cultures negative earliest monday evening  -continue to save right arm - no BP cuffs, IVs or blood draws  -medical management as per primary team    Plan discussed with Dr. Lynch

## 2022-02-10 NOTE — PROGRESS NOTE ADULT - ASSESSMENT
40 yo female w/PMH of ESRD (M/W/F), GERD, bacteremia, seizures, epilepsy, depression, COPD, HTN, heroin abuse, RA, Cdiff colitis in Dec 2021 smoker, recent admission for Aspiration Pna who  presents to [place]  ED via ambulance from dialysis center for brief loss of consciousness, questionable seizure vs syncope.        ESRD on HD   No acute need for HD today but last on 2/7.   MRSA bacteremia - repeat cx NGTD     Vascular following for access eventual Right AVF or AVG     line holiday = will need to reinsert temp HD catheter tomorrow due to ongoing fevers but will need HD soon - as per ID reccs. Will hold off on permcath for now till afebrile and continue to monitor temp trend     monitor labs daily     IR consult for temp HD catheter Friday w HD aftterwards     HTN  - oral meds    MRSA bacteremia   IV abx per ID     Anemia   TAMARA at HD   PRBC as needed     * pt seen earlier today      d.w Dr Holland   d/w Dr Ugalde

## 2022-02-10 NOTE — PROGRESS NOTE ADULT - SUBJECTIVE AND OBJECTIVE BOX
Patient was seen and evaluated at bedside. Patient febrile yesterday. Patient continues on heparin drip. VS reviewed.    Vital Signs (24 Hrs):  T(C): 36.6 (02-10-22 @ 08:15), Max: 40.5 (22 @ 19:45)  HR: 81 (02-10-22 @ 08:14) (81 - 155)  BP: 125/88 (02-10-22 @ 08:14) (122/75 - 150/110)  RR: 19 (02-10-22 @ 08:14) (19 - 26)  SpO2: 100% (02-10-22 @ 08:14) (99% - 100%)  Wt(kg): --  Daily     Daily Weight in k.5 (10 Feb 2022 05:48)    I&O's Summary    2022 07:01  -  10 2022 07:00  --------------------------------------------------------  IN: 480 mL / OUT: 0 mL / NET: 480 mL                              7.7    7.06  )-----------( 219      ( 2022 07:14 )             23.7     02-10    130<L>  |  98  |  25<H>  ----------------------------<  89  3.5   |  21<L>  |  5.58<H>    Ca    8.1<L>      10 Feb 2022 03:59    TPro  5.9<L>  /  Alb  1.8<L>  /  TBili  0.3  /  DBili  x   /  AST  10<L>  /  ALT  10<L>  /  AlkPhos  76  02-10            Physical Exam:  General: AAOx3, in NAD  HEENT: NC/AT, EOMI, poor dentition  Cardio: S1S2, tachycardic  Pulm: equal air entry b/l, non labored  Chest: dressing over right chest c/d/i  Abdomen: soft, NT/ND  Extremities: multiple excoriations over all extremities

## 2022-02-10 NOTE — PROGRESS NOTE ADULT - ATTENDING COMMENTS
Patient seen and examined  Preliminary results from recent blood cultures negative  Will tentatively plan for AV access on Monday or Tuesday

## 2022-02-10 NOTE — PROGRESS NOTE ADULT - SUBJECTIVE AND OBJECTIVE BOX
Patient is a 41y Female who is alert and awake  temps noted 105 last PM   afebrile this AM   pt denies sob or cp       Vital Signs Last 24 Hrs  T(C): 38.1 (10 Feb 2022 14:30), Max: 40.5 (09 Feb 2022 19:45)  T(F): 100.5 (10 Feb 2022 14:30), Max: 104.9 (09 Feb 2022 19:45)  HR: 81 (10 Feb 2022 08:14) (81 - 155)  BP: 125/88 (10 Feb 2022 08:14) (122/75 - 150/110)  BP(mean): --  RR: 19 (10 Feb 2022 08:14) (19 - 26)  SpO2: 100% (10 Feb 2022 08:14) (99% - 100%)      PHYSICAL EXAM:    Constitutional: frail, >>stated age  HEENT: , EOMI,  MMM  Neck: No LAD, No JVD  Respiratory: dist  Cardiovascular: S1 and S2  LE chronic changes  Neurological: asleep but arousable         LABS:                          8.0    8.10  )-----------( 248      ( 10 Feb 2022 12:18 )             25.3                         7.7    7.06  )-----------( 219      ( 09 Feb 2022 07:14 )             23.7       130    |  98     |  25     ----------------------------<  89        10 Feb 2022 03:59  3.5     |  21     |  5.58     133    |  100    |  20     ----------------------------<  85        09 Feb 2022 07:22  3.3     |  24     |  4.98     135    |  101    |  11     ----------------------------<  89        08 Feb 2022 07:46  3.1     |  22     |  3.86     Ca    8.1        10 Feb 2022 03:59  Ca    8.3        09 Feb 2022 07:22        TPro  5.9    /  Alb  1.8    /  TBili  0.3    /        10 Feb 2022 03:59  DBili  x      /  AST  10     /  ALT  10     /  AlkPhos  76       TPro  6.1    /  Alb  1.9    /  TBili  0.2    /        08 Feb 2022 07:46  DBili  x      /  AST  12     /  ALT  12     /  AlkPhos  81             Urine Studies:          RADIOLOGY & ADDITIONAL STUDIES:

## 2022-02-10 NOTE — PROVIDER CONTACT NOTE (OTHER) - BACKGROUND
Pt with bacteremia. Right chest wall HD cath noted with thrombus, pt on heparin gtt, catheter pulled on this admission.

## 2022-02-10 NOTE — PROGRESS NOTE ADULT - SUBJECTIVE AND OBJECTIVE BOX
2/5 : Patient is well known to me she was just discharged 2 days ago after a long stay . She does not look post ictal. She does not look septic. She is noted to have clot by her HD cath tip. She is on eliquis. I have discontinued her ABX     2/6 : late last night patients blood cutlures x 1 were positive . I gave her a dose of vanco yesterday. Will repeat blood cultures and get vanco trough today. I have placed official ID consult . She is laying in bed comfortably with no complaints . I had long conversation again with her about life and death and she understands how poor her prognosis is   02/08 Afebrile , c/o right neck pain where Catheter was removed   02/09 Afebrile as per Nurse patient requesting Narcotic around the Clock, given the hisotry of abuse opioids .   02/10 Spiking fever      Vital Signs Last 24 Hrs  T(C): 36.6 (10 Feb 2022 08:15), Max: 40.5 (09 Feb 2022 19:45)  T(F): 97.8 (10 Feb 2022 08:15), Max: 104.9 (09 Feb 2022 19:45)  HR: 81 (10 Feb 2022 08:14) (81 - 155)  BP: 125/88 (10 Feb 2022 08:14) (122/75 - 150/110)  BP(mean): --  RR: 19 (10 Feb 2022 08:14) (19 - 26)  SpO2: 100% (10 Feb 2022 08:14) (99% - 100%)  # General NAD   CONSTITUTIONAL: Well groomed, no apparent distress  EYES: PERRLA and symmetric, EOMI, No conjunctival or scleral injection, non-icteric  ENMT: Oral mucosa with moist membranes. No external nasal lesions; nasal mucosa not inflamed; normal dentition;  NECK: Supple, symmetric and without tracheal deviation;   RESPIRATORY:CTA b/l, no wheezes, rales or rhonch  CARDIOVASCULAR: RRRR, +S1S2, no murmurs, no rubs, no gallops; no JVD; no peripheral edema  GASTROINTESTINAL: Soft, non tender, non distended, no rebound, no guarding; No palpable masses; no hepatosplenomegaly; no hernia palpated;  LYMPHATIC: No cervical LAD or tenderness; no axillary LAD or tenderness; no inguinal LAD or tenderness  MUSCULOSKELETAL: Normal gait and station; no digital clubbing or cyanosis; SKIN: No rashes or ulcers noted; no subcutaneous nodules or induration palpable  NEUROLOGIC: Alert, awake CN II-XII intact; normal reflexes in upper and lower extremities, sensation intact in upper and lower extremities b/l to light touch; PSYCHIATRIC: Appropriate insight/judgment; A+O x 3, mood and affect appropriate, recent/remote memory intact  02-10    130<L>  |  98  |  25<H>  ----------------------------<  89  3.5   |  21<L>  |  5.58<H>    Ca    8.1<L>      10 Feb 2022 03:59    TPro  5.9<L>  /  Alb  1.8<L>  /  TBili  0.3  /  DBili  x   /  AST  10<L>  /  ALT  10<L>  /  AlkPhos  76  02-10                          8.0    8.10  )-----------( 248      ( 10 Feb 2022 12:18 )             25.3       LIVER FUNCTIONS - ( 10 Feb 2022 03:59 )  Alb: 1.8 g/dL / Pro: 5.9 gm/dL / ALK PHOS: 76 U/L / ALT: 10 U/L / AST: 10 U/L / GGT: x           PT/INR - ( 09 Feb 2022 07:22 )   PT: 12.8 sec;   INR: 1.11 ratio       PTT - ( 10 Feb 2022 03:59 )  PTT:75.8 sec    MEDICATIONS  (STANDING):  artificial tears (preservative free) Ophthalmic Solution 1 Drop(s) Both EYES two times a day  atovaquone  Suspension 1500 milliGRAM(s) Oral daily  collagenase Ointment 1 Application(s) Topical daily  epoetin amee-epbx (RETACRIT) Injectable 77010 Unit(s) IV Push <User Schedule>  gabapentin 100 milliGRAM(s) Oral three times a day  heparin  Infusion.  Unit(s)/Hr (9 mL/Hr) IV Continuous <Continuous>  hydroxychloroquine 200 milliGRAM(s) Oral daily  levETIRAcetam 250 milliGRAM(s) Oral <User Schedule>  levETIRAcetam 500 milliGRAM(s) Oral daily  metoprolol tartrate 12.5 milliGRAM(s) Oral two times a day  pantoprazole    Tablet 40 milliGRAM(s) Oral before breakfast  sevelamer carbonate 800 milliGRAM(s) Oral three times a day with meals    MEDICATIONS  (PRN):  acetaminophen     Tablet .. 650 milliGRAM(s) Oral every 6 hours PRN Temp greater or equal to 38.5C (101.3F), Mild Pain (1 - 3)  albumin human 25% IVPB 50 milliLiter(s) IV Intermittent every 1 hour PRN SBP less than 100  ALBUTerol    90 MICROgram(s) HFA Inhaler 2 Puff(s) Inhalation every 6 hours PRN Shortness of Breath and/or Wheezing  diphenhydrAMINE 25 milliGRAM(s) Oral every 6 hours PRN Rash and/or Itching  heparin   Injectable 4000 Unit(s) IV Push every 6 hours PRN For aPTT less than 40  heparin   Injectable 2000 Unit(s) IV Push every 6 hours PRN For aPTT between 40 - 57  loperamide 2 milliGRAM(s) Oral every 6 hours PRN Diarrhea  melatonin 3 milliGRAM(s) Oral at bedtime PRN Insomnia  oxycodone    5 mG/acetaminophen 325 mG 1 Tablet(s) Oral every 8 hours PRN Severe Pain (7 - 10)  senna 2 Tablet(s) Oral at bedtime PRN Constipation        # Limited study to assess left ventricular function.   Moderately reduced left ventricular systolic function.   Estimated left ventricular ejection fraction is 40-45 %.   The left ventricle size is normal with global (diffuse) hypokinesis,   dyskinesis and moderately reduced function.

## 2022-02-10 NOTE — PROGRESS NOTE ADULT - ASSESSMENT
40 yo female w/PMH of ESRD (M/W/F), GERD, bacteremia, seizures, epilepsy, depression, COPD, HTN, heroin abuse, RA, Cdiff colitis in Dec 2021 smoker, recent admission for Aspiration Pna who  presents to [place]  ED via ambulance from dialysis center for brief loss of consciousness, questionable seizure vs syncope.   EMS reported she had a 15 sec generalized seizure and was AAOx4 afterwards.  Pt states she was having shivering and not having a seizure.  She reported that her normal seizures involve her  whole body, unlike today.    She c/o  night sweats last night and chills this morning.  Today she had  1.5 hours of hemodialysis instead of her usual HD.   Pt denies cpain/SOB, no cough or resp complaints, no  abd pain.  She reported having a loose stool and vomiting x1 today and two episodes yesterday.  She has urine once a day and denies dysuria.    #. Seizure?+  - currently she denies seizure  - she does not look postictal  -No more Seizure episodes  -on Keppra     #Sepsis due to MRSA  Bacteremia   - Lactic elevated trended down after Fluids  - had cultures drawn in ER which  positive for MRSA   - gave 1 dose vanco 1 gram on 2/5  - repeated blood cultures today  - will get formal ID consult   - Removed PermCath   Echo Limited study to assess left ventricular function.  Moderately reduced left ventricular systolic function.  Estimated left ventricular ejection fraction is 40-45 %.  The left ventricle size is normal with global (diffuse) hypokinesis,  dyskinesis and moderately reduced function.  02/09 Repeat Blood cx negative up to date , WBC improving, afebrile , on Vancomycin by levels       # IVC Thrombus likely related to Perma cath   Was on Eliquis Starting on Heparin Drip  Due to Possibility of procedure   - Likely getting a Temporary HD tomorrow , NPO after Midnight.      #  ESRD on hd  - HD , Patient now without acces   - HD per nephro    # Retroperitoneal Adenopathy.   - Oncology evaluated the patient , recommended IR guided Biopsy, As per Dr Herrera    Can be done as outpatient.  Patient on Heparin , with a discal thrombus on the Permacath needing AC   I will hold on IR Guided Biopsy for now       # Rheum arth  - continue plaquenil    # HTN  discontinue lisinopril  - lowered metoprolol to 12.5 bid     #Anemia likely ACD   - Hb stable  - monitor     # Stage 4 Sacral Ulcer present on admission   Frequent turn   Wound care daily , on Collagenase   Wound care Consulted     DVT ppx : heparin     code: FULL

## 2022-02-10 NOTE — PROVIDER CONTACT NOTE (OTHER) - ASSESSMENT
Pt right arm being saved for AV fistula creation (cannot use for lab draws). Can only draw labs on left arm, left arm with poor access for lab draws

## 2022-02-10 NOTE — PROGRESS NOTE ADULT - SUBJECTIVE AND OBJECTIVE BOX
Date of service: 02-10-22 @ 10:50    pt seen and examined  feels better; sitting up in bed, nad  had high fever yesterday evening  now afebrile  no new sxs  perm-catheter removed 2/7       ROS: + fever; denies dizziness, no HA, no SOB or cough, no abdominal pain, no diarrhea or constipation; no dysuria, no urinary frequency, no legs pain, no rashes    MEDICATIONS  (STANDING):  artificial tears (preservative free) Ophthalmic Solution 1 Drop(s) Both EYES two times a day  atovaquone  Suspension 1500 milliGRAM(s) Oral daily  collagenase Ointment 1 Application(s) Topical daily  epoetin amee-epbx (RETACRIT) Injectable 37947 Unit(s) IV Push <User Schedule>  gabapentin 100 milliGRAM(s) Oral three times a day  heparin  Infusion.  Unit(s)/Hr (9 mL/Hr) IV Continuous <Continuous>  hydroxychloroquine 200 milliGRAM(s) Oral daily  levETIRAcetam 250 milliGRAM(s) Oral <User Schedule>  levETIRAcetam 500 milliGRAM(s) Oral daily  metoprolol tartrate 12.5 milliGRAM(s) Oral two times a day  pantoprazole    Tablet 40 milliGRAM(s) Oral before breakfast  sevelamer carbonate 800 milliGRAM(s) Oral three times a day with meals    Vital Signs Last 24 Hrs  T(C): 36.6 (10 Feb 2022 08:15), Max: 40.5 (09 Feb 2022 19:45)  T(F): 97.8 (10 Feb 2022 08:15), Max: 104.9 (09 Feb 2022 19:45)  HR: 81 (10 Feb 2022 08:14) (81 - 155)  BP: 125/88 (10 Feb 2022 08:14) (122/75 - 150/110)  BP(mean): --  RR: 19 (10 Feb 2022 08:14) (19 - 26)  SpO2: 100% (10 Feb 2022 08:14) (99% - 100%)    PE:  Constitutional: frail looking  HEENT: NC/AT, EOMI, PERRLA, conjunctivae clear; ears and nose atraumatic; pharynx benign  Neck: supple; thyroid not palpable  Back: no tenderness  Respiratory: respiratory effort normal; clear to auscultation  Cardiovascular: S1S2 regular, no murmurs  Abdomen: soft, not tender, not distended, positive BS; liver and spleen WNL  Genitourinary: no suprapubic tenderness  Lymphatic: no LN palpable  Musculoskeletal: no muscle tenderness, no joint swelling or tenderness  Extremities: no pedal edema  Neurological/ Psychiatric: AxOx3, Judgement and insight normal;  moving all extremities  Skin: no rashes; no palpable lesions perm cath in place, stage IV sacral ulcer     Labs: all available labs reviewed                        7.7    7.06  )-----------( 219      ( 09 Feb 2022 07:14 )             23.7     02-10    130<L>  |  98  |  25<H>  ----------------------------<  89  3.5   |  21<L>  |  5.58<H>    Ca    8.1<L>      10 Feb 2022 03:59    TPro  5.9<L>  /  Alb  1.8<L>  /  TBili  0.3  /  DBili  x   /  AST  10<L>  /  ALT  10<L>  /  AlkPhos  76  02-10       Vancomycin Level, Trough: 21.2 ug/mL (02-09 @ 13:51)  Vancomycin Level, Trough: 27.6 ug/mL (02-08 @ 19:21)  Culture - Blood (02.04.22 @ 14:06)   - Methicillin resistant Staphylococcus aureus (MRSA): Detec   Gram Stain:   Growth in aerobic bottle: Gram Positive Cocci in Clusters   Specimen Source: .Blood Blood-Peripheral   Organism: Blood Culture PCR   Culture Results:   Growth in aerobic bottle: Gram Positive Cocci in Clusters   ***Blood Panel PCR results on this specimen are available   approximately 3 hours after the Gram stain result.***   Gram stain, PCR, and/or culture results may not always   correspond due to difference in methodologies.       Radiology: all available radiological tests reviewed    *** ADDENDUM***    Questionable mild hyperenhancement of the urethra. Correlate with   urinalysis.    Findings were discussed with Dr. Epperson at 4:37 pm on 2/4/2022.    --- End of Report ---    *** END OF ADDENDUM***      PROCEDURE DATE:  02/04/2022          INTERPRETATION:  CLINICAL INFORMATION: Infected decubitus ulcer    COMPARISON: 1/21/2022, 1/18/2022.    CONTRAST/COMPLICATIONS:  IV Contrast: Omnipaque 350  90 cc administered   10 cc discarded  Oral Contrast: NONE  Complications: None reported at time of study completion    PROCEDURE:  CT of the Abdomen and Pelvis was performed.  Sagittal and coronal reformats were performed.    FINDINGS:  LOWER CHEST: Within normal limits.    LIVER: There is thrombus surrounding the catheter tip in the intrahepatic   IVC.  BILE DUCTS: Normal caliber.  GALLBLADDER: Cholecystectomy.  SPLEEN: Borderline enlarged.  PANCREAS: Within normal limits.  ADRENALS: Within normal limits.  KIDNEYS/URETERS: Within normal limits.    BLADDER: Within normal limits. Question mild hyperenhancement of the   urethra.  REPRODUCTIVE ORGANS: Uterus and adnexa within normal limits.    BOWEL: No bowel obstruction. Large amount of fluid/stool throughout the   colon. Question mild thickening versus underdistention of the sigmoid   colon. Appendix is not visualized. No evidence of inflammation in the   pericecal region.  PERITONEUM: No ascites.  VESSELS: Within the intrahepatic IVC, as described above.  RETROPERITONEUM/LYMPH NODES: Multiple enlarged retroperitoneal nodes. . A   left para-aortic node measures 1.9 x 1.4 cm (2:54). A left common iliac   node measures 2.4 x 1.3 cm (2:75)  ABDOMINAL WALL: Left sacral decubitus ulcer without definite evidence of   osseous erosion.  BONES: Degenerative changes. Degenerative changes of the right hip.    IMPRESSION:  There is thrombus surrounding the catheter tip in the intrahepatic IVC.    Retroperitoneal adenopathy, as described above. Differential includes   lymphoma.    Mildly distended, fluid-filled colon. Mild thickening versus   underdistention of the sigmoid colon.    Left sacral decubitus ulcer without definite osseous erosion. Correlate   with MRI if clinically warranted.    Additional findings as above.      ***Please see the addendum at the top of this report. It may contain   additional important information or changes.****      < end of copied text >    Advanced directives addressed: full resuscitation

## 2022-02-10 NOTE — PROVIDER CONTACT NOTE (OTHER) - SITUATION
Multiple phlebotomists unable to draw labs including PTT after many attempts. Heparin continuing to infuse at 9 ml/hr. MD made aware of situation. Lab to reattempt at 7am
Pt on heparin gtt, PTT clotted. Pt right arm being saved for AV fistula creation (cannot use for lab draws). Can only draw labs on left arm, left arm with poor access for lab draws

## 2022-02-10 NOTE — PROGRESS NOTE ADULT - ASSESSMENT
42 yo female w/PMH of ESRD (M/W/F), GERD, bacteremia, seizures, epilepsy, depression, COPD, HTN, heroin abuse, RA, Cdiff colitis in Dec 2021 smoker, recent admission for Aspiration Pna who  presents to [facility] via ambulance from dialysis center for brief loss of consciousness, questionable seizure vs syncope.   EMS reported she had a 15 sec generalized seizure and was AAOx4 afterwards.  Pt states she was having shivering and not having a seizure.  She reported that her normal seizures involve her  whole body, unlike today.    She c/o  night sweats last night and chills this morning.  Day of admit she had  1.5 hours of hemodialysis instead of her usual HD.   Pt denies cpain/SOB, no cough or resp complaints, no  abd pain.  She reported having a loose stool and vomiting x1  and two episodes day prior to admit. She has urine once a day and denies dysuria. Here noted with MRSA in blood cx. Has perm-catheter in place. Hx of substance abuse.    1. MRSA sepsis/bacteremia. source ? catheter ? sacral decub wound?  hx substance abuse. ESRD  - s/p perm-catheter removal 2/7   - s/p vancomycin x 2 day #6 of antibiotics level 21.2 2/9  - f/u level today and will re-dose 500mg post level   - repeat blood cx 2/6, 2/8 no growth so far   - TTE no obvious vegetations   - wound care eval for sacral decub - wound clean  - vascular following, plan for AVF creations vs AV graft  - suggest blood cx clearance for 48-72 hours and fever free x 24 hrs prior to new line and/or graft placement  - monitor temps  - tolerating abx well so far; no side effects noted  - reason for abx use and side effects reviewed with patient  - supportive care  - fu cbc    2. other issues - care per medicine

## 2022-02-10 NOTE — CHART NOTE - NSCHARTNOTEFT_GEN_A_CORE
40yo F with ESRD needs HD access. IR consulted for shiley placement. Plan for placement tomorrow with anesthesia. Keep pt NPO after midnight, hold heparin 4 hrs prior, repeat AM labs and COVID PCR.

## 2022-02-11 LAB
ALBUMIN SERPL ELPH-MCNC: 1.8 G/DL — LOW (ref 3.3–5)
ALP SERPL-CCNC: 76 U/L — SIGNIFICANT CHANGE UP (ref 40–120)
ALT FLD-CCNC: 8 U/L — LOW (ref 12–78)
ANION GAP SERPL CALC-SCNC: 13 MMOL/L — SIGNIFICANT CHANGE UP (ref 5–17)
APTT BLD: 37.9 SEC — HIGH (ref 27.5–35.5)
APTT BLD: 54.3 SEC — HIGH (ref 27.5–35.5)
AST SERPL-CCNC: 7 U/L — LOW (ref 15–37)
BASOPHILS # BLD AUTO: 0.03 K/UL — SIGNIFICANT CHANGE UP (ref 0–0.2)
BASOPHILS NFR BLD AUTO: 0.6 % — SIGNIFICANT CHANGE UP (ref 0–2)
BILIRUB SERPL-MCNC: 0.3 MG/DL — SIGNIFICANT CHANGE UP (ref 0.2–1.2)
BLD GP AB SCN SERPL QL: SIGNIFICANT CHANGE UP
BUN SERPL-MCNC: 32 MG/DL — HIGH (ref 7–23)
CALCIUM SERPL-MCNC: 8 MG/DL — LOW (ref 8.5–10.1)
CHLORIDE SERPL-SCNC: 97 MMOL/L — SIGNIFICANT CHANGE UP (ref 96–108)
CO2 SERPL-SCNC: 19 MMOL/L — LOW (ref 22–31)
CREAT SERPL-MCNC: 6.13 MG/DL — HIGH (ref 0.5–1.3)
CULTURE RESULTS: SIGNIFICANT CHANGE UP
CULTURE RESULTS: SIGNIFICANT CHANGE UP
EOSINOPHIL # BLD AUTO: 0.04 K/UL — SIGNIFICANT CHANGE UP (ref 0–0.5)
EOSINOPHIL NFR BLD AUTO: 0.8 % — SIGNIFICANT CHANGE UP (ref 0–6)
GLUCOSE SERPL-MCNC: 87 MG/DL — SIGNIFICANT CHANGE UP (ref 70–99)
HCT VFR BLD CALC: 21 % — CRITICAL LOW (ref 34.5–45)
HGB BLD-MCNC: 6.7 G/DL — CRITICAL LOW (ref 11.5–15.5)
HYPOCHROMIA BLD QL: SLIGHT — SIGNIFICANT CHANGE UP
IMM GRANULOCYTES NFR BLD AUTO: 1.9 % — HIGH (ref 0–1.5)
LYMPHOCYTES # BLD AUTO: 0.38 K/UL — LOW (ref 1–3.3)
LYMPHOCYTES # BLD AUTO: 7.3 % — LOW (ref 13–44)
MANUAL SMEAR VERIFICATION: SIGNIFICANT CHANGE UP
MCHC RBC-ENTMCNC: 28.6 PG — SIGNIFICANT CHANGE UP (ref 27–34)
MCHC RBC-ENTMCNC: 31.9 GM/DL — LOW (ref 32–36)
MCV RBC AUTO: 89.7 FL — SIGNIFICANT CHANGE UP (ref 80–100)
MONOCYTES # BLD AUTO: 0.34 K/UL — SIGNIFICANT CHANGE UP (ref 0–0.9)
MONOCYTES NFR BLD AUTO: 6.6 % — SIGNIFICANT CHANGE UP (ref 2–14)
NEUTROPHILS # BLD AUTO: 4.3 K/UL — SIGNIFICANT CHANGE UP (ref 1.8–7.4)
NEUTROPHILS NFR BLD AUTO: 82.8 % — HIGH (ref 43–77)
PLAT MORPH BLD: NORMAL — SIGNIFICANT CHANGE UP
PLATELET # BLD AUTO: 230 K/UL — SIGNIFICANT CHANGE UP (ref 150–400)
POLYCHROMASIA BLD QL SMEAR: SLIGHT — SIGNIFICANT CHANGE UP
POTASSIUM SERPL-MCNC: 3.4 MMOL/L — LOW (ref 3.5–5.3)
POTASSIUM SERPL-SCNC: 3.4 MMOL/L — LOW (ref 3.5–5.3)
PROT SERPL-MCNC: 6.1 GM/DL — SIGNIFICANT CHANGE UP (ref 6–8.3)
RBC # BLD: 2.34 M/UL — LOW (ref 3.8–5.2)
RBC # FLD: 16.5 % — HIGH (ref 10.3–14.5)
RBC BLD AUTO: ABNORMAL
SODIUM SERPL-SCNC: 129 MMOL/L — LOW (ref 135–145)
SPECIMEN SOURCE: SIGNIFICANT CHANGE UP
SPECIMEN SOURCE: SIGNIFICANT CHANGE UP
WBC # BLD: 5.19 K/UL — SIGNIFICANT CHANGE UP (ref 3.8–10.5)
WBC # FLD AUTO: 5.19 K/UL — SIGNIFICANT CHANGE UP (ref 3.8–10.5)

## 2022-02-11 PROCEDURE — 76937 US GUIDE VASCULAR ACCESS: CPT | Mod: 26

## 2022-02-11 PROCEDURE — 77001 FLUOROGUIDE FOR VEIN DEVICE: CPT | Mod: 26

## 2022-02-11 PROCEDURE — 99232 SBSQ HOSP IP/OBS MODERATE 35: CPT

## 2022-02-11 PROCEDURE — 36556 INSERT NON-TUNNEL CV CATH: CPT

## 2022-02-11 RX ORDER — ACETAMINOPHEN 500 MG
1000 TABLET ORAL ONCE
Refills: 0 | Status: COMPLETED | OUTPATIENT
Start: 2022-02-11 | End: 2022-02-11

## 2022-02-11 RX ORDER — SODIUM CHLORIDE 9 MG/ML
10 INJECTION INTRAMUSCULAR; INTRAVENOUS; SUBCUTANEOUS
Refills: 0 | Status: DISCONTINUED | OUTPATIENT
Start: 2022-02-11 | End: 2022-02-18

## 2022-02-11 RX ORDER — VANCOMYCIN HCL 1 G
500 VIAL (EA) INTRAVENOUS ONCE
Refills: 0 | Status: COMPLETED | OUTPATIENT
Start: 2022-02-12 | End: 2022-02-12

## 2022-02-11 RX ORDER — VANCOMYCIN HCL 1 G
125 VIAL (EA) INTRAVENOUS EVERY 6 HOURS
Refills: 0 | Status: DISCONTINUED | OUTPATIENT
Start: 2022-02-11 | End: 2022-02-14

## 2022-02-11 RX ORDER — ERYTHROPOIETIN 10000 [IU]/ML
10000 INJECTION, SOLUTION INTRAVENOUS; SUBCUTANEOUS ONCE
Refills: 0 | Status: COMPLETED | OUTPATIENT
Start: 2022-02-11 | End: 2022-02-11

## 2022-02-11 RX ORDER — VANCOMYCIN HCL 1 G
500 VIAL (EA) INTRAVENOUS ONCE
Refills: 0 | Status: DISCONTINUED | OUTPATIENT
Start: 2022-02-11 | End: 2022-02-15

## 2022-02-11 RX ORDER — ACETAMINOPHEN 500 MG
650 TABLET ORAL ONCE
Refills: 0 | Status: COMPLETED | OUTPATIENT
Start: 2022-02-11 | End: 2022-02-11

## 2022-02-11 RX ORDER — CHLORHEXIDINE GLUCONATE 213 G/1000ML
1 SOLUTION TOPICAL
Refills: 0 | Status: DISCONTINUED | OUTPATIENT
Start: 2022-02-11 | End: 2022-02-18

## 2022-02-11 RX ADMIN — Medication 200 MILLIGRAM(S): at 10:48

## 2022-02-11 RX ADMIN — Medication 12.5 MILLIGRAM(S): at 10:49

## 2022-02-11 RX ADMIN — OXYCODONE AND ACETAMINOPHEN 1 TABLET(S): 5; 325 TABLET ORAL at 06:48

## 2022-02-11 RX ADMIN — HEPARIN SODIUM 1700 UNIT(S)/HR: 5000 INJECTION INTRAVENOUS; SUBCUTANEOUS at 09:07

## 2022-02-11 RX ADMIN — GABAPENTIN 100 MILLIGRAM(S): 400 CAPSULE ORAL at 22:01

## 2022-02-11 RX ADMIN — Medication 1 DROP(S): at 10:53

## 2022-02-11 RX ADMIN — OXYCODONE AND ACETAMINOPHEN 1 TABLET(S): 5; 325 TABLET ORAL at 06:38

## 2022-02-11 RX ADMIN — HEPARIN SODIUM 1600 UNIT(S)/HR: 5000 INJECTION INTRAVENOUS; SUBCUTANEOUS at 07:30

## 2022-02-11 RX ADMIN — HEPARIN SODIUM 4000 UNIT(S): 5000 INJECTION INTRAVENOUS; SUBCUTANEOUS at 02:01

## 2022-02-11 RX ADMIN — GABAPENTIN 100 MILLIGRAM(S): 400 CAPSULE ORAL at 05:24

## 2022-02-11 RX ADMIN — LEVETIRACETAM 250 MILLIGRAM(S): 250 TABLET, FILM COATED ORAL at 22:19

## 2022-02-11 RX ADMIN — HEPARIN SODIUM 1600 UNIT(S)/HR: 5000 INJECTION INTRAVENOUS; SUBCUTANEOUS at 02:04

## 2022-02-11 RX ADMIN — Medication 3 MILLIGRAM(S): at 22:01

## 2022-02-11 RX ADMIN — Medication 400 MILLIGRAM(S): at 18:26

## 2022-02-11 RX ADMIN — OXYCODONE AND ACETAMINOPHEN 1 TABLET(S): 5; 325 TABLET ORAL at 16:55

## 2022-02-11 RX ADMIN — OXYCODONE AND ACETAMINOPHEN 1 TABLET(S): 5; 325 TABLET ORAL at 17:45

## 2022-02-11 RX ADMIN — Medication 650 MILLIGRAM(S): at 13:30

## 2022-02-11 RX ADMIN — Medication 1 APPLICATION(S): at 10:53

## 2022-02-11 RX ADMIN — LEVETIRACETAM 500 MILLIGRAM(S): 250 TABLET, FILM COATED ORAL at 10:48

## 2022-02-11 RX ADMIN — PANTOPRAZOLE SODIUM 40 MILLIGRAM(S): 20 TABLET, DELAYED RELEASE ORAL at 07:07

## 2022-02-11 RX ADMIN — Medication 125 MILLIGRAM(S): at 22:17

## 2022-02-11 RX ADMIN — ATOVAQUONE 1500 MILLIGRAM(S): 750 SUSPENSION ORAL at 10:49

## 2022-02-11 RX ADMIN — OXYCODONE AND ACETAMINOPHEN 1 TABLET(S): 5; 325 TABLET ORAL at 06:00

## 2022-02-11 RX ADMIN — SEVELAMER CARBONATE 800 MILLIGRAM(S): 2400 POWDER, FOR SUSPENSION ORAL at 22:19

## 2022-02-11 RX ADMIN — Medication 12.5 MILLIGRAM(S): at 22:01

## 2022-02-11 NOTE — PROGRESS NOTE ADULT - SUBJECTIVE AND OBJECTIVE BOX
Patient is a 41y Female who is alert and awake  pt seen this AM    awaitiing shiley HD cathter insertion via IR   hb noted = PRBC planned   no overt bleeding per RN      Vital Signs Last 24 Hrs  T(C): 37.4 (11 Feb 2022 19:54), Max: 40.7 (11 Feb 2022 18:00)  T(F): 99.3 (11 Feb 2022 19:54), Max: 105.3 (11 Feb 2022 18:00)  HR: 118 (11 Feb 2022 19:54) (92 - 148)  BP: 173/89 (11 Feb 2022 19:54) (135/85 - 174/95)  BP(mean): 81 (11 Feb 2022 07:43) (81 - 81)  RR: 19 (11 Feb 2022 19:54) (16 - 20)  SpO2: 94% (11 Feb 2022 19:54) (94% - 94%)        PHYSICAL EXAM:    Constitutional: frail, >>stated age  HEENT: , EOMI,  MMM  Neck: No LAD, No JVD  Respiratory: dist  Cardiovascular: S1 and S2  LE chronic changes  Neurological: asleep but arousable         LABS:                                              6.7    5.19  )-----------( 230      ( 11 Feb 2022 08:08 )             21.0                         8.0    8.10  )-----------( 248      ( 10 Feb 2022 12:18 )             25.3           129    |  97     |  32     ----------------------------<  87        11 Feb 2022 08:08  3.4     |  19     |  6.13     130    |  98     |  25     ----------------------------<  89        10 Feb 2022 03:59  3.5     |  21     |  5.58     133    |  100    |  20     ----------------------------<  85        09 Feb 2022 07:22  3.3     |  24     |  4.98     Ca    8.0        11 Feb 2022 08:08  Ca    8.1        10 Feb 2022 03:59        TPro  6.1    /  Alb  1.8    /  TBili  0.3    /        11 Feb 2022 08:08  DBili  x      /  AST  7      /  ALT  8      /  AlkPhos  76       TPro  5.9    /  Alb  1.8    /  TBili  0.3    /        10 Feb 2022 03:59  DBili  x      /  AST  10     /  ALT  10     /  AlkPhos  76           TPro  5.9    /  Alb  1.8    /  TBili  0.3    /        10 Feb 2022 03:59  DBili  x      /  AST  10     /  ALT  10     /  AlkPhos  76       TPro  6.1    /  Alb  1.9    /  TBili  0.2    /        08 Feb 2022 07:46  DBili  x      /  AST  12     /  ALT  12     /  AlkPhos  81             Urine Studies:          RADIOLOGY & ADDITIONAL STUDIES:

## 2022-02-11 NOTE — PROGRESS NOTE ADULT - SUBJECTIVE AND OBJECTIVE BOX
Date of service: 02-11-22 @ 09:38    pt seen and examined  feels better; sitting up in bed, nad  febrile o/n and low grade temps this am  denies resp sxs, no diarrhea, no rashes  perm-catheter removed 2/7     ROS: + fever; denies dizziness, no HA, no SOB or cough, no abdominal pain, no diarrhea or constipation; no dysuria, no urinary frequency, no legs pain, no rashes    MEDICATIONS  (STANDING):  artificial tears (preservative free) Ophthalmic Solution 1 Drop(s) Both EYES two times a day  atovaquone  Suspension 1500 milliGRAM(s) Oral daily  collagenase Ointment 1 Application(s) Topical daily  epoetin amee-epbx (RETACRIT) Injectable 58224 Unit(s) IV Push <User Schedule>  gabapentin 100 milliGRAM(s) Oral three times a day  hydroxychloroquine 200 milliGRAM(s) Oral daily  levETIRAcetam 250 milliGRAM(s) Oral <User Schedule>  levETIRAcetam 500 milliGRAM(s) Oral daily  metoprolol tartrate 12.5 milliGRAM(s) Oral two times a day  pantoprazole    Tablet 40 milliGRAM(s) Oral before breakfast  sevelamer carbonate 800 milliGRAM(s) Oral three times a day with meals    Vital Signs Last 24 Hrs  T(C): 37.9 (11 Feb 2022 07:43), Max: 38.9 (10 Feb 2022 23:45)  T(F): 100.3 (11 Feb 2022 07:43), Max: 102 (10 Feb 2022 23:45)  HR: 106 (11 Feb 2022 07:43) (106 - 106)  BP: 145/86 (10 Feb 2022 20:03) (145/86 - 145/86)  BP(mean): 81 (11 Feb 2022 07:43) (81 - 81)  RR: 18 (11 Feb 2022 07:43) (18 - 18)  SpO2: 94% (11 Feb 2022 07:43) (93% - 94%)      PE:  Constitutional: frail looking  HEENT: NC/AT, EOMI, PERRLA, conjunctivae clear; ears and nose atraumatic; pharynx benign  Neck: supple; thyroid not palpable  Back: no tenderness  Respiratory: respiratory effort normal; clear to auscultation  Cardiovascular: S1S2 regular, no murmurs  Abdomen: soft, not tender, not distended, positive BS; liver and spleen WNL  Genitourinary: no suprapubic tenderness  Lymphatic: no LN palpable  Musculoskeletal: no muscle tenderness, no joint swelling or tenderness  Extremities: no pedal edema  Neurological/ Psychiatric: AxOx3, Judgement and insight normal;  moving all extremities  Skin: no rashes; no palpable lesions perm cath in place, stage IV sacral ulcer     Labs: all available labs reviewed                                   6.7    5.19  )-----------( 230      ( 11 Feb 2022 08:08 )             21.0     02-11    129<L>  |  97  |  32<H>  ----------------------------<  87  3.4<L>   |  19<L>  |  6.13<H>    Ca    8.0<L>      11 Feb 2022 08:08    TPro  6.1  /  Alb  1.8<L>  /  TBili  0.3  /  DBili  x   /  AST  7<L>  /  ALT  8<L>  /  AlkPhos  76  02-11      Vancomycin Level, Trough: 19.9 ug/mL (02-10 @ 16:43)  Vancomycin Level, Trough: 21.2 ug/mL (02-09 @ 13:51)    Culture - Blood (02.04.22 @ 14:06)   - Methicillin resistant Staphylococcus aureus (MRSA): Detec   Gram Stain:   Growth in aerobic bottle: Gram Positive Cocci in Clusters   Specimen Source: .Blood Blood-Peripheral   Organism: Blood Culture PCR   Culture Results:   Growth in aerobic bottle: Gram Positive Cocci in Clusters   ***Blood Panel PCR results on this specimen are available   approximately 3 hours after the Gram stain result.***   Gram stain, PCR, and/or culture results may not always   correspond due to difference in methodologies.       Radiology: all available radiological tests reviewed    *** ADDENDUM***    Questionable mild hyperenhancement of the urethra. Correlate with   urinalysis.    Findings were discussed with Dr. Epperson at 4:37 pm on 2/4/2022.    --- End of Report ---    *** END OF ADDENDUM***      PROCEDURE DATE:  02/04/2022          INTERPRETATION:  CLINICAL INFORMATION: Infected decubitus ulcer    COMPARISON: 1/21/2022, 1/18/2022.    CONTRAST/COMPLICATIONS:  IV Contrast: Omnipaque 350  90 cc administered   10 cc discarded  Oral Contrast: NONE  Complications: None reported at time of study completion    PROCEDURE:  CT of the Abdomen and Pelvis was performed.  Sagittal and coronal reformats were performed.    FINDINGS:  LOWER CHEST: Within normal limits.    LIVER: There is thrombus surrounding the catheter tip in the intrahepatic   IVC.  BILE DUCTS: Normal caliber.  GALLBLADDER: Cholecystectomy.  SPLEEN: Borderline enlarged.  PANCREAS: Within normal limits.  ADRENALS: Within normal limits.  KIDNEYS/URETERS: Within normal limits.    BLADDER: Within normal limits. Question mild hyperenhancement of the   urethra.  REPRODUCTIVE ORGANS: Uterus and adnexa within normal limits.    BOWEL: No bowel obstruction. Large amount of fluid/stool throughout the   colon. Question mild thickening versus underdistention of the sigmoid   colon. Appendix is not visualized. No evidence of inflammation in the   pericecal region.  PERITONEUM: No ascites.  VESSELS: Within the intrahepatic IVC, as described above.  RETROPERITONEUM/LYMPH NODES: Multiple enlarged retroperitoneal nodes. . A   left para-aortic node measures 1.9 x 1.4 cm (2:54). A left common iliac   node measures 2.4 x 1.3 cm (2:75)  ABDOMINAL WALL: Left sacral decubitus ulcer without definite evidence of   osseous erosion.  BONES: Degenerative changes. Degenerative changes of the right hip.    IMPRESSION:  There is thrombus surrounding the catheter tip in the intrahepatic IVC.    Retroperitoneal adenopathy, as described above. Differential includes   lymphoma.    Mildly distended, fluid-filled colon. Mild thickening versus   underdistention of the sigmoid colon.    Left sacral decubitus ulcer without definite osseous erosion. Correlate   with MRI if clinically warranted.    Additional findings as above.      ***Please see the addendum at the top of this report. It may contain   additional important information or changes.****      < end of copied text >    Advanced directives addressed: full resuscitation Date of service: 02-11-22 @ 09:38    pt seen and examined  feels better; sitting up in bed, nad  febrile o/n and low grade temps this am  denies resp sxs, no diarrhea, no rashes  perm-catheter removed 2/7   plan for shiley placement today    ROS: + fever; denies dizziness, no HA, no SOB or cough, no abdominal pain, no diarrhea or constipation; no dysuria, no urinary frequency, no legs pain, no rashes    MEDICATIONS  (STANDING):  artificial tears (preservative free) Ophthalmic Solution 1 Drop(s) Both EYES two times a day  atovaquone  Suspension 1500 milliGRAM(s) Oral daily  collagenase Ointment 1 Application(s) Topical daily  epoetin amee-epbx (RETACRIT) Injectable 62555 Unit(s) IV Push <User Schedule>  gabapentin 100 milliGRAM(s) Oral three times a day  hydroxychloroquine 200 milliGRAM(s) Oral daily  levETIRAcetam 250 milliGRAM(s) Oral <User Schedule>  levETIRAcetam 500 milliGRAM(s) Oral daily  metoprolol tartrate 12.5 milliGRAM(s) Oral two times a day  pantoprazole    Tablet 40 milliGRAM(s) Oral before breakfast  sevelamer carbonate 800 milliGRAM(s) Oral three times a day with meals    Vital Signs Last 24 Hrs  T(C): 37.9 (11 Feb 2022 07:43), Max: 38.9 (10 Feb 2022 23:45)  T(F): 100.3 (11 Feb 2022 07:43), Max: 102 (10 Feb 2022 23:45)  HR: 106 (11 Feb 2022 07:43) (106 - 106)  BP: 145/86 (10 Feb 2022 20:03) (145/86 - 145/86)  BP(mean): 81 (11 Feb 2022 07:43) (81 - 81)  RR: 18 (11 Feb 2022 07:43) (18 - 18)  SpO2: 94% (11 Feb 2022 07:43) (93% - 94%)      PE:  Constitutional: frail looking  HEENT: NC/AT, EOMI, PERRLA, conjunctivae clear; ears and nose atraumatic; pharynx benign  Neck: supple; thyroid not palpable  Back: no tenderness  Respiratory: respiratory effort normal; clear to auscultation  Cardiovascular: S1S2 regular, no murmurs  Abdomen: soft, not tender, not distended, positive BS; liver and spleen WNL  Genitourinary: no suprapubic tenderness  Lymphatic: no LN palpable  Musculoskeletal: no muscle tenderness, no joint swelling or tenderness  Extremities: no pedal edema  Neurological/ Psychiatric: AxOx3, Judgement and insight normal;  moving all extremities  Skin: no rashes; no palpable lesions, stage IV sacral ulcer wound clean no drainage    Labs: all available labs reviewed                                   6.7    5.19  )-----------( 230      ( 11 Feb 2022 08:08 )             21.0     02-11    129<L>  |  97  |  32<H>  ----------------------------<  87  3.4<L>   |  19<L>  |  6.13<H>    Ca    8.0<L>      11 Feb 2022 08:08    TPro  6.1  /  Alb  1.8<L>  /  TBili  0.3  /  DBili  x   /  AST  7<L>  /  ALT  8<L>  /  AlkPhos  76  02-11      Vancomycin Level, Trough: 19.9 ug/mL (02-10 @ 16:43)  Vancomycin Level, Trough: 21.2 ug/mL (02-09 @ 13:51)    Culture - Blood (02.04.22 @ 14:06)   - Methicillin resistant Staphylococcus aureus (MRSA): Detec   Gram Stain:   Growth in aerobic bottle: Gram Positive Cocci in Clusters   Specimen Source: .Blood Blood-Peripheral   Organism: Blood Culture PCR   Culture Results:   Growth in aerobic bottle: Gram Positive Cocci in Clusters   ***Blood Panel PCR results on this specimen are available   approximately 3 hours after the Gram stain result.***   Gram stain, PCR, and/or culture results may not always   correspond due to difference in methodologies.       Radiology: all available radiological tests reviewed    *** ADDENDUM***    Questionable mild hyperenhancement of the urethra. Correlate with   urinalysis.    Findings were discussed with Dr. Epperson at 4:37 pm on 2/4/2022.    --- End of Report ---    *** END OF ADDENDUM***      PROCEDURE DATE:  02/04/2022          INTERPRETATION:  CLINICAL INFORMATION: Infected decubitus ulcer    COMPARISON: 1/21/2022, 1/18/2022.    CONTRAST/COMPLICATIONS:  IV Contrast: Omnipaque 350  90 cc administered   10 cc discarded  Oral Contrast: NONE  Complications: None reported at time of study completion    PROCEDURE:  CT of the Abdomen and Pelvis was performed.  Sagittal and coronal reformats were performed.    FINDINGS:  LOWER CHEST: Within normal limits.    LIVER: There is thrombus surrounding the catheter tip in the intrahepatic   IVC.  BILE DUCTS: Normal caliber.  GALLBLADDER: Cholecystectomy.  SPLEEN: Borderline enlarged.  PANCREAS: Within normal limits.  ADRENALS: Within normal limits.  KIDNEYS/URETERS: Within normal limits.    BLADDER: Within normal limits. Question mild hyperenhancement of the   urethra.  REPRODUCTIVE ORGANS: Uterus and adnexa within normal limits.    BOWEL: No bowel obstruction. Large amount of fluid/stool throughout the   colon. Question mild thickening versus underdistention of the sigmoid   colon. Appendix is not visualized. No evidence of inflammation in the   pericecal region.  PERITONEUM: No ascites.  VESSELS: Within the intrahepatic IVC, as described above.  RETROPERITONEUM/LYMPH NODES: Multiple enlarged retroperitoneal nodes. . A   left para-aortic node measures 1.9 x 1.4 cm (2:54). A left common iliac   node measures 2.4 x 1.3 cm (2:75)  ABDOMINAL WALL: Left sacral decubitus ulcer without definite evidence of   osseous erosion.  BONES: Degenerative changes. Degenerative changes of the right hip.    IMPRESSION:  There is thrombus surrounding the catheter tip in the intrahepatic IVC.    Retroperitoneal adenopathy, as described above. Differential includes   lymphoma.    Mildly distended, fluid-filled colon. Mild thickening versus   underdistention of the sigmoid colon.    Left sacral decubitus ulcer without definite osseous erosion. Correlate   with MRI if clinically warranted.    Additional findings as above.      ***Please see the addendum at the top of this report. It may contain   additional important information or changes.****      < end of copied text >    Advanced directives addressed: full resuscitation Date of service: 02-11-22 @ 09:38    pt seen and examined  feels better; sitting up in bed, nad  febrile o/n and low grade temps this am  denies resp sxs, no diarrhea, no rashes  perm-catheter removed 2/7   plan for shiley placement today    ROS: + fever; denies dizziness, no HA, no SOB or cough, no abdominal pain, no diarrhea or constipation; no dysuria, no urinary frequency, no legs pain, no rashes    MEDICATIONS  (STANDING):  artificial tears (preservative free) Ophthalmic Solution 1 Drop(s) Both EYES two times a day  atovaquone  Suspension 1500 milliGRAM(s) Oral daily  collagenase Ointment 1 Application(s) Topical daily  epoetin amee-epbx (RETACRIT) Injectable 79203 Unit(s) IV Push <User Schedule>  gabapentin 100 milliGRAM(s) Oral three times a day  hydroxychloroquine 200 milliGRAM(s) Oral daily  levETIRAcetam 250 milliGRAM(s) Oral <User Schedule>  levETIRAcetam 500 milliGRAM(s) Oral daily  metoprolol tartrate 12.5 milliGRAM(s) Oral two times a day  pantoprazole    Tablet 40 milliGRAM(s) Oral before breakfast  sevelamer carbonate 800 milliGRAM(s) Oral three times a day with meals    Vital Signs Last 24 Hrs  T(C): 37.9 (11 Feb 2022 07:43), Max: 38.9 (10 Feb 2022 23:45)  T(F): 100.3 (11 Feb 2022 07:43), Max: 102 (10 Feb 2022 23:45)  HR: 106 (11 Feb 2022 07:43) (106 - 106)  BP: 145/86 (10 Feb 2022 20:03) (145/86 - 145/86)  BP(mean): 81 (11 Feb 2022 07:43) (81 - 81)  RR: 18 (11 Feb 2022 07:43) (18 - 18)  SpO2: 94% (11 Feb 2022 07:43) (93% - 94%)      PE:  Constitutional: frail looking  HEENT: NC/AT, EOMI, PERRLA, conjunctivae clear; ears and nose atraumatic; pharynx benign  Neck: supple; thyroid not palpable  Back: no tenderness  Respiratory: respiratory effort normal; clear to auscultation  Cardiovascular: S1S2 regular, no murmurs  Abdomen: soft, not tender, not distended, positive BS; liver and spleen WNL  Genitourinary: no suprapubic tenderness  Lymphatic: no LN palpable  Musculoskeletal: no muscle tenderness, no joint swelling or tenderness  Extremities: no pedal edema  Neurological/ Psychiatric: AxOx3, Judgement and insight normal;  moving all extremities  Skin: no rashes; no palpable lesions, stage IV sacral ulcer wound clean no drainage    Labs: all available labs reviewed                                   6.7    5.19  )-----------( 230      ( 11 Feb 2022 08:08 )             21.0     02-11    129<L>  |  97  |  32<H>  ----------------------------<  87  3.4<L>   |  19<L>  |  6.13<H>    Ca    8.0<L>      11 Feb 2022 08:08    TPro  6.1  /  Alb  1.8<L>  /  TBili  0.3  /  DBili  x   /  AST  7<L>  /  ALT  8<L>  /  AlkPhos  76  02-11      Vancomycin Level, Trough: 19.9 ug/mL (02-10 @ 16:43)  Vancomycin Level, Trough: 21.2 ug/mL (02-09 @ 13:51)    Culture - Blood (02.04.22 @ 14:06)   - Methicillin resistant Staphylococcus aureus (MRSA): Detec   Gram Stain:   Growth in aerobic bottle: Gram Positive Cocci in Clusters   Specimen Source: .Blood Blood-Peripheral   Organism: Blood Culture PCR   Culture Results:   Growth in aerobic bottle: Gram Positive Cocci in Clusters   ***Blood Panel PCR results on this specimen are available   approximately 3 hours after the Gram stain result.***   Gram stain, PCR, and/or culture results may not always   correspond due to difference in methodologies.       Radiology: all available radiological tests reviewed        Questionable mild hyperenhancement of the urethra. Correlate with   urinalysis.    Findings were discussed with Dr. Epperson at 4:37 pm on 2/4/2022.        PROCEDURE DATE:  02/04/2022          INTERPRETATION:  CLINICAL INFORMATION: Infected decubitus ulcer    COMPARISON: 1/21/2022, 1/18/2022.    CONTRAST/COMPLICATIONS:  IV Contrast: Omnipaque 350  90 cc administered   10 cc discarded  Oral Contrast: NONE  Complications: None reported at time of study completion    PROCEDURE:  CT of the Abdomen and Pelvis was performed.  Sagittal and coronal reformats were performed.    FINDINGS:  LOWER CHEST: Within normal limits.    LIVER: There is thrombus surrounding the catheter tip in the intrahepatic   IVC.  BILE DUCTS: Normal caliber.  GALLBLADDER: Cholecystectomy.  SPLEEN: Borderline enlarged.  PANCREAS: Within normal limits.  ADRENALS: Within normal limits.  KIDNEYS/URETERS: Within normal limits.    BLADDER: Within normal limits. Question mild hyperenhancement of the   urethra.  REPRODUCTIVE ORGANS: Uterus and adnexa within normal limits.    BOWEL: No bowel obstruction. Large amount of fluid/stool throughout the   colon. Question mild thickening versus underdistention of the sigmoid   colon. Appendix is not visualized. No evidence of inflammation in the   pericecal region.  PERITONEUM: No ascites.  VESSELS: Within the intrahepatic IVC, as described above.  RETROPERITONEUM/LYMPH NODES: Multiple enlarged retroperitoneal nodes. . A   left para-aortic node measures 1.9 x 1.4 cm (2:54). A left common iliac   node measures 2.4 x 1.3 cm (2:75)  ABDOMINAL WALL: Left sacral decubitus ulcer without definite evidence of   osseous erosion.  BONES: Degenerative changes. Degenerative changes of the right hip.    IMPRESSION:  There is thrombus surrounding the catheter tip in the intrahepatic IVC.    Retroperitoneal adenopathy, as described above. Differential includes   lymphoma.    Mildly distended, fluid-filled colon. Mild thickening versus   underdistention of the sigmoid colon.    Left sacral decubitus ulcer without definite osseous erosion. Correlate   with MRI if clinically warranted.    Additional findings as above.      Advanced directives addressed: full resuscitation Date of service: 02-11-22 @ 09:38    pt seen and examined  feels better; sitting up in bed, nad  febrile o/n and low grade temps this am  denies resp sxs, no diarrhea, no new rashes  perm-catheter removed 2/7   plan for shiley placement today    ROS: + fever; denies dizziness, no HA, no SOB or cough, no abdominal pain, no diarrhea or constipation; no dysuria, no urinary frequency, no legs pain, no rashes    MEDICATIONS  (STANDING):  artificial tears (preservative free) Ophthalmic Solution 1 Drop(s) Both EYES two times a day  atovaquone  Suspension 1500 milliGRAM(s) Oral daily  collagenase Ointment 1 Application(s) Topical daily  epoetin amee-epbx (RETACRIT) Injectable 66281 Unit(s) IV Push <User Schedule>  gabapentin 100 milliGRAM(s) Oral three times a day  hydroxychloroquine 200 milliGRAM(s) Oral daily  levETIRAcetam 250 milliGRAM(s) Oral <User Schedule>  levETIRAcetam 500 milliGRAM(s) Oral daily  metoprolol tartrate 12.5 milliGRAM(s) Oral two times a day  pantoprazole    Tablet 40 milliGRAM(s) Oral before breakfast  sevelamer carbonate 800 milliGRAM(s) Oral three times a day with meals    Vital Signs Last 24 Hrs  T(C): 37.9 (11 Feb 2022 07:43), Max: 38.9 (10 Feb 2022 23:45)  T(F): 100.3 (11 Feb 2022 07:43), Max: 102 (10 Feb 2022 23:45)  HR: 106 (11 Feb 2022 07:43) (106 - 106)  BP: 145/86 (10 Feb 2022 20:03) (145/86 - 145/86)  BP(mean): 81 (11 Feb 2022 07:43) (81 - 81)  RR: 18 (11 Feb 2022 07:43) (18 - 18)  SpO2: 94% (11 Feb 2022 07:43) (93% - 94%)      PE:  Constitutional: frail looking  HEENT: NC/AT, EOMI, PERRLA, conjunctivae clear; ears and nose atraumatic; pharynx benign  Neck: supple; thyroid not palpable  Back: no tenderness  Respiratory: respiratory effort normal; clear to auscultation  Cardiovascular: S1S2 regular, no murmurs  Abdomen: soft, not tender, not distended, positive BS; liver and spleen WNL  Genitourinary: no suprapubic tenderness  Lymphatic: no LN palpable  Musculoskeletal: no muscle tenderness, no joint swelling or tenderness  Extremities: no pedal edema  Neurological/ Psychiatric: AxOx3, Judgement and insight normal;  moving all extremities  Skin: no rashes; no palpable lesions, stage IV sacral ulcer wound clean no drainage    Labs: all available labs reviewed                                   6.7    5.19  )-----------( 230      ( 11 Feb 2022 08:08 )             21.0     02-11    129<L>  |  97  |  32<H>  ----------------------------<  87  3.4<L>   |  19<L>  |  6.13<H>    Ca    8.0<L>      11 Feb 2022 08:08    TPro  6.1  /  Alb  1.8<L>  /  TBili  0.3  /  DBili  x   /  AST  7<L>  /  ALT  8<L>  /  AlkPhos  76  02-11      Vancomycin Level, Trough: 19.9 ug/mL (02-10 @ 16:43)  Vancomycin Level, Trough: 21.2 ug/mL (02-09 @ 13:51)    Culture - Blood (02.04.22 @ 14:06)   - Methicillin resistant Staphylococcus aureus (MRSA): Detec   Gram Stain:   Growth in aerobic bottle: Gram Positive Cocci in Clusters   Specimen Source: .Blood Blood-Peripheral   Organism: Blood Culture PCR   Culture Results:   Growth in aerobic bottle: Gram Positive Cocci in Clusters   ***Blood Panel PCR results on this specimen are available   approximately 3 hours after the Gram stain result.***   Gram stain, PCR, and/or culture results may not always   correspond due to difference in methodologies.       Radiology: all available radiological tests reviewed        Questionable mild hyperenhancement of the urethra. Correlate with   urinalysis.    Findings were discussed with Dr. Epperson at 4:37 pm on 2/4/2022.        PROCEDURE DATE:  02/04/2022          INTERPRETATION:  CLINICAL INFORMATION: Infected decubitus ulcer    COMPARISON: 1/21/2022, 1/18/2022.    CONTRAST/COMPLICATIONS:  IV Contrast: Omnipaque 350  90 cc administered   10 cc discarded  Oral Contrast: NONE  Complications: None reported at time of study completion    PROCEDURE:  CT of the Abdomen and Pelvis was performed.  Sagittal and coronal reformats were performed.    FINDINGS:  LOWER CHEST: Within normal limits.    LIVER: There is thrombus surrounding the catheter tip in the intrahepatic   IVC.  BILE DUCTS: Normal caliber.  GALLBLADDER: Cholecystectomy.  SPLEEN: Borderline enlarged.  PANCREAS: Within normal limits.  ADRENALS: Within normal limits.  KIDNEYS/URETERS: Within normal limits.    BLADDER: Within normal limits. Question mild hyperenhancement of the   urethra.  REPRODUCTIVE ORGANS: Uterus and adnexa within normal limits.    BOWEL: No bowel obstruction. Large amount of fluid/stool throughout the   colon. Question mild thickening versus underdistention of the sigmoid   colon. Appendix is not visualized. No evidence of inflammation in the   pericecal region.  PERITONEUM: No ascites.  VESSELS: Within the intrahepatic IVC, as described above.  RETROPERITONEUM/LYMPH NODES: Multiple enlarged retroperitoneal nodes. . A   left para-aortic node measures 1.9 x 1.4 cm (2:54). A left common iliac   node measures 2.4 x 1.3 cm (2:75)  ABDOMINAL WALL: Left sacral decubitus ulcer without definite evidence of   osseous erosion.  BONES: Degenerative changes. Degenerative changes of the right hip.    IMPRESSION:  There is thrombus surrounding the catheter tip in the intrahepatic IVC.    Retroperitoneal adenopathy, as described above. Differential includes   lymphoma.    Mildly distended, fluid-filled colon. Mild thickening versus   underdistention of the sigmoid colon.    Left sacral decubitus ulcer without definite osseous erosion. Correlate   with MRI if clinically warranted.    Additional findings as above.      Advanced directives addressed: full resuscitation

## 2022-02-11 NOTE — CHART NOTE - NSCHARTNOTEFT_GEN_A_CORE
Clinical Nutrition Follow Up Note:  Pt is a pleasant 40 yo female w/PMH of ESRD (M/W/F), GERD, bacteremia, seizures, epilepsy, depression, COPD, HTN, heroin abuse, RA, Cdiff colitis in Dec 2021 smoker, recent admission for Aspiration Pna who  presents to [facility]  [facility] via ambulance from dialysis center for brief loss of consciousness, questionable seizure vs syncope.   EMS reported she had a 15 sec generalized seizure and was AAOx4 afterwards.  Pt states she was having shivering and not having a seizure.  She reported that her normal seizures involve her  whole body, unlike today.    She c/o  night sweats last night and chills this morning.  Today she had  1.5 hours of hemodialysis instead of her usual HD.   Pt denies cpain/SOB, no cough or resp complaints, no  abd pain.  She reported having a loose stool and vomiting x1 today and two episodes yesterday.  She has urine once a day and denies dysuria.  2/5 : Patient is well known to me she was just discharged 2 days ago after a long stay . She does not look post ictal. She does not look septic. She is noted to have clot by her HD cath tip. She is on eliquis. I have discontinued her ABX   2/6 : late last night patients blood cultures x 1 were positive . I gave her a dose of vanco yesterday. Will repeat blood cultures and get vanco trough today. I have placed official ID consult . She is laying in bed comfortably with no complaints . I had long conversation again with her about life and death and she understands how poor her prognosis is   02/08 Afebrile , c/o right neck pain where Catheter was removed   02/09 Afebrile as per Nurse patient requesting Narcotic around the Clock, given the history of abuse opioids .   02/10 Spiking fever      RD f/u: 02/11/22: pt was seen for f/u. On admission, pt was severely malnourished, with PO intake PTA fair ~50% of meals consumed and had persistent diarrhea. Pt stated her diarrhea has improved and she has been eating better than previous. Pt also has no symptoms of N/V/C. She does not drink Nepro w/ meals due to not liking the taste. Will provide Ensure Enlive once a day to increase caloric and protein intake due to labs being stable, will monitor K labs.       *Labs Reviewed:  02-11    129<L>  |  97  |  32<H>  ----------------------------<  87  3.4<L>   |  19<L>  |  6.13<H>    Ca    8.0<L>      11 Feb 2022 08:08    TPro  6.1  /  Alb  1.8<L>  /  TBili  0.3  /  DBili  x   /  AST  7<L>  /  ALT  8<L>  /  AlkPhos  76  02-11      BMI: BMI (kg/m2): 18.2 (02-08-22 @ 19:40)  HbA1c:   Glucose: POCT Blood Glucose.: 146 mg/dL (01-26-22 @ 23:48)    BP: 145/86 (02-10-22 @ 20:03) (121/76 - 150/110)  Lipid Panel: Date/Time: 10-12-21 @ 09:54  Cholesterol, Serum: 137  Direct LDL: --  HDL Cholesterol, Serum: 14  Total Cholesterol/HDL Ration Measurement: --  Triglycerides, Serum: 470          *pertinent meds:  apixaban 2.5 milliGRAM(s) Oral two times a day  artificial  tears Solution 1 Drop(s) Both EYES two times a day  artificial tears (preservative free) Ophthalmic Solution 1 Drop(s) Both EYES two times a day  atovaquone  Suspension 1500 milliGRAM(s) Oral daily  collagenase Ointment 1 Application(s) Topical daily  epoetin amee-epbx (RETACRIT) Injectable 48708 Unit(s) IV Push <User Schedule>  gabapentin 100 milliGRAM(s) Oral three times a day  hydrALAZINE 25 milliGRAM(s) Oral three times a day  hydroxychloroquine 200 milliGRAM(s) Oral daily  levETIRAcetam 250 milliGRAM(s) Oral <User Schedule>  levETIRAcetam 500 milliGRAM(s) Oral daily  lisinopril 10 milliGRAM(s) Oral daily  metoprolol tartrate 50 milliGRAM(s) Oral two times a day  pantoprazole    Tablet 40 milliGRAM(s) Oral before breakfast  sevelamer carbonate 800 milliGRAM(s) Oral three times a day with meals  silver sulfADIAZINE 1% Cream 1 Application(s) Topical two times a day    *I and O's: not documented      *(+) BM on 02/10 ; pt on bowel regimen.    *john paul score of 15: Left buttock. PU documented. No edema documented.    *PO/TF intake, meeting ~50 % of estimated nutr needs.    *Malnutrition dx: Severe protein/calorie malnutrition in context of acute on chronic illness r/t inability to meet increased nutrient demands 2/2 sepsis AEB severe muscle/fat wasting and significant weight loss x 2 months (14%)      Diet, NPO after Midnight:      NPO Start Date: 10-Feb-2022,   NPO Start Time: 23:59 (02-10-22 @ 15:18) [Active]  Diet, Renal Restrictions:   For patients receiving Renal Replacement - No Protein Restr, No Conc K, No Conc Phos, Low Sodium (02-04-22 @ 21:46) [Active]            Estimated Needs: Based on 46.4 Kg   Calories: 7216-8030 Kcal ( Kcal/Kg)  Protein: 83. g ( g/Kg)  Fluids: 5471-6052 mL ( mL/Kg)    *Wt Hx: UBW: 119 lbs, lost 17 lbs (15% wt change 2 mo; clinical sig)    Height (cm): 165.1 (02-04-22 @ 13:50), 165.1 (01-18-22 @ 13:55)  Weight (kg): 49.7 (02-08-22 @ 19:40), 47.6 (01-19-22 @ 18:39)  BMI (kg/m2): 18.2 (02-08-22 @ 19:40), 17.5 (02-04-22 @ 13:50), 17.5 (01-19-22 @ 18:39)  BSA (m2): 1.53 (02-08-22 @ 19:40), 1.5 (02-04-22 @ 13:50), 1.5 (01-19-22 @ 18:39)    Recommendations:  Change Nepro 8 oz TID to Ensure Enlive once a day since labs are stable  Monitor K (Ensure Enlive)  Encourage PO intake      *will continue to monitor and follow up with adjustments to treatment plan prn*    RD contact: [phone] Clinical Nutrition Follow Up Note:  Pt is a pleasant 40 yo female w/PMH of ESRD (M/W/F), GERD, bacteremia, seizures, epilepsy, depression, COPD, HTN, heroin abuse, RA, Cdiff colitis in Dec 2021 smoker, recent admission for Aspiration Pna who  presents to [facility]  [facility] via ambulance from dialysis center for brief loss of consciousness, questionable seizure vs syncope.   EMS reported she had a 15 sec generalized seizure and was AAOx4 afterwards.  Pt states she was having shivering and not having a seizure.  She reported that her normal seizures involve her  whole body, unlike today.    She c/o  night sweats last night and chills this morning.  Today she had  1.5 hours of hemodialysis instead of her usual HD.   Pt denies cpain/SOB, no cough or resp complaints, no  abd pain.  She reported having a loose stool and vomiting x1 today and two episodes yesterday.  She has urine once a day and denies dysuria.  02/09 Afebrile as per Nurse patient requesting Narcotic around the Clock, given the history of abuse opioids .   02/10 Spiking fever      RD f/u: 02/11/22: pt was seen for f/u. On admission, pt was severely malnourished, with PO intake PTA fair ~50% of meals consumed and had persistent diarrhea. Pt stated her diarrhea has improved and she has been eating better than previous. Pt also has no symptoms of N/V/C. She does not drink Nepro w/ meals due to not liking the taste. Will provide Ensure Enlive once a day to increase caloric and protein intake due to labs being stable, will monitor K labs.       *Labs Reviewed:  02-11    129<L>  |  97  |  32<H>  ----------------------------<  87  3.4<L>   |  19<L>  |  6.13<H>    Ca    8.0<L>      11 Feb 2022 08:08    TPro  6.1  /  Alb  1.8<L>  /  TBili  0.3  /  DBili  x   /  AST  7<L>  /  ALT  8<L>  /  AlkPhos  76  02-11      BMI: BMI (kg/m2): 18.2 (02-08-22 @ 19:40)  HbA1c:   Glucose: POCT Blood Glucose.: 146 mg/dL (01-26-22 @ 23:48)    BP: 145/86 (02-10-22 @ 20:03) (121/76 - 150/110)  Lipid Panel: Date/Time: 10-12-21 @ 09:54  Cholesterol, Serum: 137  Direct LDL: --  HDL Cholesterol, Serum: 14  Total Cholesterol/HDL Ration Measurement: --  Triglycerides, Serum: 470          *pertinent meds:  apixaban 2.5 milliGRAM(s) Oral two times a day  artificial  tears Solution 1 Drop(s) Both EYES two times a day  artificial tears (preservative free) Ophthalmic Solution 1 Drop(s) Both EYES two times a day  atovaquone  Suspension 1500 milliGRAM(s) Oral daily  collagenase Ointment 1 Application(s) Topical daily  epoetin amee-epbx (RETACRIT) Injectable 45997 Unit(s) IV Push <User Schedule>  gabapentin 100 milliGRAM(s) Oral three times a day  hydrALAZINE 25 milliGRAM(s) Oral three times a day  hydroxychloroquine 200 milliGRAM(s) Oral daily  levETIRAcetam 250 milliGRAM(s) Oral <User Schedule>  levETIRAcetam 500 milliGRAM(s) Oral daily  lisinopril 10 milliGRAM(s) Oral daily  metoprolol tartrate 50 milliGRAM(s) Oral two times a day  pantoprazole    Tablet 40 milliGRAM(s) Oral before breakfast  sevelamer carbonate 800 milliGRAM(s) Oral three times a day with meals  silver sulfADIAZINE 1% Cream 1 Application(s) Topical two times a day    *I and O's: not documented      *(+) BM on 02/10 ; pt on bowel regimen.    *john paul score of 15: Left buttock. PU documented. No edema documented.    *PO/TF intake, meeting ~50 % of estimated nutr needs.    *Malnutrition dx: Severe protein/calorie malnutrition in context of acute on chronic illness r/t inability to meet increased nutrient demands 2/2 sepsis AEB severe muscle/fat wasting and significant weight loss x 2 months (14%)      Diet, NPO after Midnight:      NPO Start Date: 10-Feb-2022,   NPO Start Time: 23:59 (02-10-22 @ 15:18) [Active]  Diet, Renal Restrictions:   For patients receiving Renal Replacement - No Protein Restr, No Conc K, No Conc Phos, Low Sodium (02-04-22 @ 21:46) [Active]            Estimated Needs: Based on 46.4 Kg   Calories: 0342-4347 Kcal ( Kcal/Kg)  Protein: 83. g ( g/Kg)  Fluids: 4160-1790 mL ( mL/Kg)    *Wt Hx: UBW: 119 lbs, lost 17 lbs (15% wt change 2 mo; clinical sig)    Height (cm): 165.1 (02-04-22 @ 13:50), 165.1 (01-18-22 @ 13:55)  Weight (kg): 49.7 (02-08-22 @ 19:40), 47.6 (01-19-22 @ 18:39)  BMI (kg/m2): 18.2 (02-08-22 @ 19:40), 17.5 (02-04-22 @ 13:50), 17.5 (01-19-22 @ 18:39)  BSA (m2): 1.53 (02-08-22 @ 19:40), 1.5 (02-04-22 @ 13:50), 1.5 (01-19-22 @ 18:39)    Recommendations:  Change Nepro 8 oz TID to Ensure Enlive once a day since labs are stable  Monitor K (Ensure Enlive)  Encourage PO intake      *will continue to monitor and follow up with adjustments to treatment plan prn*    RD contact: [phone]

## 2022-02-11 NOTE — PROGRESS NOTE ADULT - SUBJECTIVE AND OBJECTIVE BOX
2/5 : Patient is well known to me she was just discharged 2 days ago after a long stay . She does not look post ictal. She does not look septic. She is noted to have clot by her HD cath tip. She is on eliquis. I have discontinued her ABX     2/6 : late last night patients blood cutlures x 1 were positive . I gave her a dose of vanco yesterday. Will repeat blood cultures and get vanco trough today. I have placed official ID consult . She is laying in bed comfortably with no complaints . I had long conversation again with her about life and death and she understands how poor her prognosis is   02/08 Afebrile , c/o right neck pain where Catheter was removed   02/09 Afebrile as per Nurse patient requesting Narcotic around the Clock, given the hisotry of abuse opioids .   02/10 Spiking fever        Vital Signs Last 24 Hrs  T(C): 38.7 (11 Feb 2022 12:43), Max: 38.9 (10 Feb 2022 23:45)  T(F): 101.6 (11 Feb 2022 12:43), Max: 102 (10 Feb 2022 23:45)  HR: 106 (11 Feb 2022 07:43) (106 - 106)  BP: 145/86 (10 Feb 2022 20:03) (145/86 - 145/86)  BP(mean): 81 (11 Feb 2022 07:43) (81 - 81)  RR: 18 (11 Feb 2022 07:43) (18 - 18)  SpO2: 94% (11 Feb 2022 07:43) (93% - 94%))  # General NAD   CONSTITUTIONAL: Well groomed, no apparent distress  EYES: PERRLA and symmetric, EOMI, No conjunctival or scleral injection, non-icteric  ENMT: Oral mucosa with moist membranes. No external nasal lesions; nasal mucosa not inflamed; normal dentition;  NECK: Supple, symmetric and without tracheal deviation;   RESPIRATORY:CTA b/l, no wheezes, rales or rhonch  CARDIOVASCULAR: RRRR, +S1S2, no murmurs, no rubs, no gallops; no JVD; no peripheral edema  GASTROINTESTINAL: Soft, non tender, non distended, no rebound, no guarding; No palpable masses; no hepatosplenomegaly; no hernia palpated;  LYMPHATIC: No cervical LAD or tenderness; no axillary LAD or tenderness; no inguinal LAD or tenderness  MUSCULOSKELETAL: Normal gait and station; no digital clubbing or cyanosis; SKIN: No rashes or ulcers noted; no subcutaneous nodules or induration palpable  NEUROLOGIC: Alert, awake CN II-XII intact; normal reflexes in upper and lower extremities, sensation intact in upper and lower extremities b/l to light touch;   PSYCHIATRIC: Appropriate insight/judgment; A+O x 3, mood and affect appropriate, recent/remote memory intact    02-11    129<L>  |  97  |  32<H>  ----------------------------<  87  3.4<L>   |  19<L>  |  6.13<H>    Ca    8.0<L>      11 Feb 2022 08:08    TPro  6.1  /  Alb  1.8<L>  /  TBili  0.3  /  DBili  x   /  AST  7<L>  /  ALT  8<L>  /  AlkPhos  76  02-11                          6.7    5.19  )-----------( 230      ( 11 Feb 2022 08:08 )             21.0       LIVER FUNCTIONS - ( 11 Feb 2022 08:08 )  Alb: 1.8 g/dL / Pro: 6.1 gm/dL / ALK PHOS: 76 U/L / ALT: 8 U/L / AST: 7 U/L / GGT: x             PTT - ( 11 Feb 2022 08:08 )  PTT:54.3 sec      # Limited study to assess left ventricular function.   Moderately reduced left ventricular systolic function.   Estimated left ventricular ejection fraction is 40-45 %.   The left ventricle size is normal with global (diffuse) hypokinesis,   dyskinesis and moderately reduced function.    MEDICATIONS  (STANDING):  artificial tears (preservative free) Ophthalmic Solution 1 Drop(s) Both EYES two times a day  atovaquone  Suspension 1500 milliGRAM(s) Oral daily  collagenase Ointment 1 Application(s) Topical daily  epoetin amee-epbx (RETACRIT) Injectable 09071 Unit(s) IV Push <User Schedule>  gabapentin 100 milliGRAM(s) Oral three times a day  hydroxychloroquine 200 milliGRAM(s) Oral daily  levETIRAcetam 250 milliGRAM(s) Oral <User Schedule>  levETIRAcetam 500 milliGRAM(s) Oral daily  metoprolol tartrate 12.5 milliGRAM(s) Oral two times a day  pantoprazole    Tablet 40 milliGRAM(s) Oral before breakfast  sevelamer carbonate 800 milliGRAM(s) Oral three times a day with meals  vancomycin  IVPB 500 milliGRAM(s) IV Intermittent once    MEDICATIONS  (PRN):  acetaminophen     Tablet .. 650 milliGRAM(s) Oral every 6 hours PRN Temp greater or equal to 38.5C (101.3F), Mild Pain (1 - 3)  albumin human 25% IVPB 50 milliLiter(s) IV Intermittent every 1 hour PRN SBP less than 100  ALBUTerol    90 MICROgram(s) HFA Inhaler 2 Puff(s) Inhalation every 6 hours PRN Shortness of Breath and/or Wheezing  diphenhydrAMINE 25 milliGRAM(s) Oral every 6 hours PRN Rash and/or Itching  loperamide 2 milliGRAM(s) Oral every 6 hours PRN Diarrhea  melatonin 3 milliGRAM(s) Oral at bedtime PRN Insomnia  oxycodone    5 mG/acetaminophen 325 mG 1 Tablet(s) Oral every 8 hours PRN Severe Pain (7 - 10)  senna 2 Tablet(s) Oral at bedtime PRN Constipation

## 2022-02-11 NOTE — PROGRESS NOTE ADULT - ASSESSMENT
40 yo F with ESRD on HD, presents with bacteremia s/p RIJ PC removal, in need of HD access.    Plan:  -f/u blood cultures - 2/4 MRSA, 2/6 & 2/8 NGTD, Will follow up final blood cultures prelim negative - blood cultures from 2/11 pending  -please repeat blood cultures when patient has next fever   -ID on board, recs appreciated  -c/w IV abx  -nephro on board, recs appreciated  -will need temporary HD access in the interim - for shiley placement with IR today  -will plan for a right AVF creation vs right AVG with biologic on this admission once blood cultures negative earliest Monday evening  -continue to save right arm - no BP cuffs, IVs or blood draws  -medical management as per primary team    Plan discussed with Dr. Lynch

## 2022-02-11 NOTE — PROGRESS NOTE ADULT - ASSESSMENT
A/P: 41 Female with Known MRSA Bacteremia    Plan:  Patient can go back to 3 EAST  Tach from the Fever.  Recc IV tylenol x 1  Do not Give 30 cc/KG because she is a HD patient and she also does not need a Lactate drawn  She has been getting Vanco based on the levels  Suggest Fungal Blood Cultures as well.  She has been fungemic in the past      Please recall if she becomes hypotensive     D/W Nursing and staff and Hospitalist

## 2022-02-11 NOTE — PROGRESS NOTE ADULT - ASSESSMENT
42 yo female w/PMH of ESRD (M/W/F), GERD, bacteremia, seizures, epilepsy, depression, COPD, HTN, heroin abuse, RA, Cdiff colitis in Dec 2021 smoker, recent admission for Aspiration Pna who  presents to [place]  [facility] via ambulance from dialysis center for brief loss of consciousness, questionable seizure vs syncope.        ESRD on HD  last HD 2/7    attempt HD today after nontunelled catheter w IR then continue HD TTS    MRSA bacteremia - repeat cx NGTD    still w fevers **     Vascular following for access eventual Right AVF or AVG     hold on permacath until afebrile          HTN  - oral meds    Fevers ongoing   ID following workup      MRSA bacteremia    IV abx per ID     Anemia - on overt bleeding   chronic /infection ?   TAMARA at HD   PRBC today     * pt seen earlier today     d/w Dr Tram López covering weekend **

## 2022-02-11 NOTE — PROGRESS NOTE ADULT - ASSESSMENT
40 yo female w/PMH of ESRD (M/W/F), GERD, bacteremia, seizures, epilepsy, depression, COPD, HTN, heroin abuse, RA, Cdiff colitis in Dec 2021 smoker, recent admission for Aspiration Pna who  presents to [place]  ED via ambulance from dialysis center for brief loss of consciousness, questionable seizure vs syncope.   EMS reported she had a 15 sec generalized seizure and was AAOx4 afterwards. Pt states she was having shivering and not having a seizure.  She reported that her normal seizures involve her  whole body, unlike today.    She c/o  night sweats last night and chills this morning. Day of admit she had  1.5 hours of hemodialysis instead of her usual HD.   Pt denies cpain/SOB, no cough or resp complaints, no  abd pain.  She reported having a loose stool and vomiting x1 and two episodes day prior to admit. She has urine once a day and denies dysuria. Here noted with MRSA in blood cx. Has perm-catheter in place. Hx of substance abuse.    1. MRSA sepsis/bacteremia. source ? catheter ? sacral decub wound?  hx substance abuse. ESRD  - fever ? source, sacral wound clean, perm cath site clean - line removed 2/7  - no diarrhea, if she develops diarrhea start empiric oral vancomycin as hx cdad  - s/p perm-catheter removal 2/7   - s/p vancomycin x 2 day #7 of antibiotics  - re-dose vancomycin post HD today   - repeat blood cx 2/6, 2/8 no growth so far  - f/u repeat blood cx 2/11  - if fevers continue repeat xray, check ua   - TTE no obvious vegetations   - wound care eval for sacral decub - wound clean  - vascular following, plan for AV graft placement 2/14  - suggest blood cx clearance for 48-72 hours and fever free x 24 hrs prior to new tessio line and/or graft placement  - monitor temps  - tolerating abx well so far; no side effects noted  - reason for abx use and side effects reviewed with patient  - supportive care  - fu cbc    2. other issues - care per medicine  42 yo female w/PMH of ESRD (M/W/F), GERD, bacteremia, seizures, epilepsy, depression, COPD, HTN, heroin abuse, RA, Cdiff colitis in Dec 2021 smoker, recent admission for Aspiration Pna who  presents to [place]  ED via ambulance from dialysis center for brief loss of consciousness, questionable seizure vs syncope.   EMS reported she had a 15 sec generalized seizure and was AAOx4 afterwards. Pt states she was having shivering and not having a seizure.  She reported that her normal seizures involve her  whole body, unlike today.    She c/o  night sweats last night and chills this morning. Day of admit she had  1.5 hours of hemodialysis instead of her usual HD.   Pt denies cpain/SOB, no cough or resp complaints, no  abd pain.  She reported having a loose stool and vomiting x1 and two episodes day prior to admit. She has urine once a day and denies dysuria. Here noted with MRSA in blood cx. Has perm-catheter in place. Hx of substance abuse.    1. MRSA sepsis/bacteremia. source ? catheter ? sacral decub wound?  IVC thrombus. hx substance abuse. ESRD  - fever ? source, ? ivc thrombus? sacral wound clean, perm cath site clean - line removed 2/7   - no diarrhea, if she develops diarrhea start empiric oral vancomycin as hx cdad  - s/p perm-catheter removal 2/7   - s/p vancomycin x 2 day #7 of antibiotics  - re-dose vancomycin post HD today   - repeat blood cx 2/6, 2/8 no growth so far  - f/u repeat blood cx 2/11  - if fevers continue repeat xray, check ua   - TTE no obvious vegetations   - wound care eval for sacral decub - wound clean  - vascular following, plan for AV graft placement 2/14  - suggest blood cx clearance for 48-72 hours and fever free x 24 hrs prior to new tessio line and/or graft placement  - monitor temps  - tolerating abx well so far; no side effects noted  - reason for abx use and side effects reviewed with patient  - supportive care  - fu cbc    2. other issues - care per medicine  40 yo female w/PMH of ESRD (M/W/F), GERD, bacteremia, seizures, epilepsy, depression, COPD, HTN, heroin abuse, RA, Cdiff colitis in Dec 2021 smoker, recent admission for Aspiration Pna who  presents to [place]  ED via ambulance from dialysis center for brief loss of consciousness, questionable seizure vs syncope.   EMS reported she had a 15 sec generalized seizure and was AAOx4 afterwards. Pt states she was having shivering and not having a seizure.  She reported that her normal seizures involve her  whole body, unlike today.    She c/o  night sweats last night and chills this morning. Day of admit she had  1.5 hours of hemodialysis instead of her usual HD.   Pt denies cpain/SOB, no cough or resp complaints, no  abd pain.  She reported having a loose stool and vomiting x1 and two episodes day prior to admit. She has urine once a day and denies dysuria. Here noted with MRSA in blood cx. Has perm-catheter in place. Hx of substance abuse.    1. MRSA sepsis/bacteremia. source ? catheter ? sacral decub wound?  IVC thrombus. hx substance abuse. ESRD  - fever ? source, ? ivc thrombus? sacral wound clean, perm cath site clean - line removed 2/7   - no diarrhea, if she develops diarrhea start empiric oral vancomycin as hx cdad  - s/p perm-catheter removal 2/7   - s/p vancomycin x 2 day #7 of antibiotics  - re-dose vancomycin post HD today   - repeat blood cx 2/6, 2/8 no growth so far  - f/u repeat blood cx 2/11  - TTE no obvious vegetations   - wound care eval for sacral decub - wound clean  - vascular following, tentative plan for AV graft placement 2/14  - suggest blood cx clearance for 48-72 hours and fever free x 24 hrs prior to new tessio line and/or graft placement  - dc mepron as she was on this for pcp prophylaxis when she was on steroids in past for vasculitis, no longer on steroids   - if fevers continue, suggest  xray, check ua and repeat imaging  - also consider rheum etiologies for fever in ddx as hx vasculitis/RA   - monitor temps  - tolerating abx well so far; no side effects noted  - reason for abx use and side effects reviewed with patient  - supportive care  - fu cbc    2. other issues - care per medicine  42 yo female w/PMH of ESRD (M/W/F), GERD, bacteremia, seizures, epilepsy, depression, COPD, HTN, heroin abuse, RA, Cdiff colitis in Dec 2021 smoker, recent admission for Aspiration Pna who  presents to [place]  ED via ambulance from dialysis center for brief loss of consciousness, questionable seizure vs syncope.   EMS reported she had a 15 sec generalized seizure and was AAOx4 afterwards. Pt states she was having shivering and not having a seizure.  She reported that her normal seizures involve her  whole body, unlike day of admit.  She c/o  night sweats last night and chills this morning. Day of admit she had  1.5 hours of hemodialysis instead of her usual HD.   Pt denies cpain/SOB, no cough or resp complaints, no  abd pain.  She reported having a loose stool and vomiting x1 and two episodes day prior to admit. She has urine once a day and denies dysuria. Here noted with MRSA in blood cx. Has perm-catheter in place. Hx of substance abuse.    1. MRSA sepsis/bacteremia. source ? catheter ? sacral decub wound?  IVC thrombus. hx substance abuse. ESRD  - fever ? source, ? ivc thrombus? sacral wound clean, perm cath site clean - line removed 2/7   - no diarrhea, if she develops diarrhea start empiric oral vancomycin as hx cdad  - s/p perm-catheter removal 2/7   - s/p vancomycin x 2 day #7 of antibiotics  - re-dose vancomycin post HD today   - repeat blood cx 2/6, 2/8 no growth so far;  f/u repeat blood cx 2/11  - TTE no obvious vegetations   - wound care eval for sacral decub - wound clean  - vascular following, tentative plan for AV graft placement 2/14  - suggest blood cx clearance for 48-72 hours and fever free x 24 hrs prior to new tessio line and/or graft placement  - dc mepron as she was on this for pcp prophylaxis when she was on steroids in past for RA/vasculitis, no longer on steroids   - if high fevers continue, consider repeat imaging for source eval, esr, crp  - also consider rheum etiologies for fever in ddx as hx lupus, ra, vasculitis   - monitor temps  - tolerating abx well so far; no side effects noted  - reason for abx use and side effects reviewed with patient  - supportive care  - fu cbc    2. other issues - care per medicine

## 2022-02-11 NOTE — PROGRESS NOTE ADULT - ASSESSMENT
40 yo female w/PMH of ESRD (M/W/F), GERD, bacteremia, seizures, epilepsy, depression, COPD, HTN, heroin abuse, RA, Cdiff colitis in Dec 2021 smoker, recent admission for Aspiration Pna who  presents to [place]  ED via ambulance from dialysis center for brief loss of consciousness, questionable seizure vs syncope.   EMS reported she had a 15 sec generalized seizure and was AAOx4 afterwards.  Pt states she was having shivering and not having a seizure.  She reported that her normal seizures involve her  whole body, unlike today.    She c/o  night sweats last night and chills this morning.  Today she had  1.5 hours of hemodialysis instead of her usual HD.   Pt denies cpain/SOB, no cough or resp complaints, no  abd pain.  She reported having a loose stool and vomiting x1 today and two episodes yesterday.  She has urine once a day and denies dysuria.    #. Seizure?+  - currently she denies seizure  - she does not look postictal  -No more Seizure episodes  -on Keppra     #Sepsis due to MRSA  Bacteremia   - Lactic elevated trended down after Fluids  - had cultures drawn in ER which  positive for MRSA   - gave 1 dose vanco 1 gram on 2/5  - repeated blood cultures today  - will get formal ID consult   - Removed PermCath   Echo Limited study to assess left ventricular function.  Moderately reduced left ventricular systolic function.  Estimated left ventricular ejection fraction is 40-45 %.  The left ventricle size is normal with global (diffuse) hypokinesis,  dyskinesis and moderately reduced function.  02/09 Repeat Blood cx negative up to date , WBC improving, afebrile , on Vancomycin by levels   02/10 Reuben fever   02/11 Spiking fever       # IVC Thrombus likely related to Perma cath   Was on Eliquis Starting on Heparin Drip stopped   Due to Possibility of procedure   - Likely getting a Temporary HD late today ,      # Acute On Chronic anemia / ACD   -hem dropped 6.7   -Ordering 1 PRBC   - Trend Hem Transfuse as needed     #  ESRD on hd  - HD , Patient now without acces  getting Shiley Cath later today   - HD per nephro    # Retroperitoneal Adenopathy.   - Oncology evaluated the patient , recommended IR guided Biopsy, As per Dr Herrera    Can be done as outpatient.  Patient on Heparin , with a discal thrombus on the Permacath needing AC   I will hold on IR Guided Biopsy for now       # Rheum arth  - continue Plaquenil    # HTN  discontinue lisinopril  - lowered metoprolol to 12.5 bid     #Anemia likely ACD   - Hb stable  - monitor     # Stage 4 Sacral Ulcer present on admission   Frequent turn   Wound care daily , on Collagenase   Wound care Consulted     DVT ppx : heparin     code: FULL

## 2022-02-12 LAB
-  CANDIDA ALBICANS: SIGNIFICANT CHANGE UP
ANION GAP SERPL CALC-SCNC: 13 MMOL/L — SIGNIFICANT CHANGE UP (ref 5–17)
BASE EXCESS BLDV CALC-SCNC: -4.1 MMOL/L — SIGNIFICANT CHANGE UP
BUN SERPL-MCNC: 28 MG/DL — HIGH (ref 7–23)
CALCIUM SERPL-MCNC: 7.8 MG/DL — LOW (ref 8.5–10.1)
CHLORIDE SERPL-SCNC: 92 MMOL/L — LOW (ref 96–108)
CO2 BLDV-SCNC: 24 MMOL/L — SIGNIFICANT CHANGE UP (ref 22–26)
CO2 SERPL-SCNC: 21 MMOL/L — LOW (ref 22–31)
CREAT SERPL-MCNC: 4.96 MG/DL — HIGH (ref 0.5–1.3)
GAS PNL BLDV: SIGNIFICANT CHANGE UP
GLUCOSE SERPL-MCNC: 78 MG/DL — SIGNIFICANT CHANGE UP (ref 70–99)
GRAM STN FLD: SIGNIFICANT CHANGE UP
HCO3 BLDV-SCNC: 22 MMOL/L — SIGNIFICANT CHANGE UP (ref 22–29)
HCT VFR BLD CALC: 24.4 % — LOW (ref 34.5–45)
HGB BLD-MCNC: 8.2 G/DL — LOW (ref 11.5–15.5)
LACTATE SERPL-SCNC: 0.9 MMOL/L — SIGNIFICANT CHANGE UP (ref 0.7–2)
LACTATE SERPL-SCNC: 6 MMOL/L — CRITICAL HIGH (ref 0.7–2)
LACTATE SERPL-SCNC: 6.1 MMOL/L — CRITICAL HIGH (ref 0.7–2)
MAGNESIUM SERPL-MCNC: 1.7 MG/DL — SIGNIFICANT CHANGE UP (ref 1.6–2.6)
MCHC RBC-ENTMCNC: 29.6 PG — SIGNIFICANT CHANGE UP (ref 27–34)
MCHC RBC-ENTMCNC: 33.6 GM/DL — SIGNIFICANT CHANGE UP (ref 32–36)
MCV RBC AUTO: 88.1 FL — SIGNIFICANT CHANGE UP (ref 80–100)
METHOD TYPE: SIGNIFICANT CHANGE UP
PCO2 BLDV: 44 MMHG — HIGH (ref 39–42)
PH BLDV: 7.31 — LOW (ref 7.32–7.43)
PHOSPHATE SERPL-MCNC: 4.5 MG/DL — SIGNIFICANT CHANGE UP (ref 2.5–4.5)
PLATELET # BLD AUTO: 230 K/UL — SIGNIFICANT CHANGE UP (ref 150–400)
PO2 BLDV: 54 MMHG — SIGNIFICANT CHANGE UP
POTASSIUM SERPL-MCNC: 3.4 MMOL/L — LOW (ref 3.5–5.3)
POTASSIUM SERPL-SCNC: 3.4 MMOL/L — LOW (ref 3.5–5.3)
PROCALCITONIN SERPL-MCNC: 384.8 NG/ML — HIGH (ref 0.02–0.1)
RBC # BLD: 2.77 M/UL — LOW (ref 3.8–5.2)
RBC # FLD: 15.7 % — HIGH (ref 10.3–14.5)
SAO2 % BLDV: 81.6 % — SIGNIFICANT CHANGE UP
SODIUM SERPL-SCNC: 126 MMOL/L — LOW (ref 135–145)
SPECIMEN SOURCE: SIGNIFICANT CHANGE UP
VANCOMYCIN FLD-MCNC: 6.4 UG/ML — LOW (ref 10–20)
WBC # BLD: 13.04 K/UL — HIGH (ref 3.8–10.5)
WBC # FLD AUTO: 13.04 K/UL — HIGH (ref 3.8–10.5)

## 2022-02-12 PROCEDURE — 74176 CT ABD & PELVIS W/O CONTRAST: CPT | Mod: 26

## 2022-02-12 PROCEDURE — 71250 CT THORAX DX C-: CPT | Mod: 26

## 2022-02-12 PROCEDURE — 93010 ELECTROCARDIOGRAM REPORT: CPT

## 2022-02-12 PROCEDURE — 99233 SBSQ HOSP IP/OBS HIGH 50: CPT

## 2022-02-12 PROCEDURE — 71045 X-RAY EXAM CHEST 1 VIEW: CPT | Mod: 26

## 2022-02-12 RX ORDER — CASPOFUNGIN ACETATE 7 MG/ML
50 INJECTION, POWDER, LYOPHILIZED, FOR SOLUTION INTRAVENOUS EVERY 24 HOURS
Refills: 0 | Status: DISCONTINUED | OUTPATIENT
Start: 2022-02-13 | End: 2022-02-16

## 2022-02-12 RX ORDER — APIXABAN 2.5 MG/1
2.5 TABLET, FILM COATED ORAL
Refills: 0 | Status: DISCONTINUED | OUTPATIENT
Start: 2022-02-12 | End: 2022-02-16

## 2022-02-12 RX ORDER — CASPOFUNGIN ACETATE 7 MG/ML
70 INJECTION, POWDER, LYOPHILIZED, FOR SOLUTION INTRAVENOUS ONCE
Refills: 0 | Status: COMPLETED | OUTPATIENT
Start: 2022-02-12 | End: 2022-02-12

## 2022-02-12 RX ORDER — ACETAMINOPHEN 500 MG
1000 TABLET ORAL ONCE
Refills: 0 | Status: DISCONTINUED | OUTPATIENT
Start: 2022-02-12 | End: 2022-02-18

## 2022-02-12 RX ORDER — CASPOFUNGIN ACETATE 7 MG/ML
INJECTION, POWDER, LYOPHILIZED, FOR SOLUTION INTRAVENOUS
Refills: 0 | Status: DISCONTINUED | OUTPATIENT
Start: 2022-02-12 | End: 2022-02-16

## 2022-02-12 RX ORDER — ONDANSETRON 8 MG/1
4 TABLET, FILM COATED ORAL ONCE
Refills: 0 | Status: COMPLETED | OUTPATIENT
Start: 2022-02-12 | End: 2022-02-12

## 2022-02-12 RX ORDER — ACETAMINOPHEN 500 MG
1000 TABLET ORAL ONCE
Refills: 0 | Status: COMPLETED | OUTPATIENT
Start: 2022-02-12 | End: 2022-02-12

## 2022-02-12 RX ORDER — ACETAMINOPHEN 500 MG
1000 TABLET ORAL ONCE
Refills: 0 | Status: DISCONTINUED | OUTPATIENT
Start: 2022-02-12 | End: 2022-02-12

## 2022-02-12 RX ADMIN — Medication 50 MILLILITER(S): at 14:12

## 2022-02-12 RX ADMIN — OXYCODONE AND ACETAMINOPHEN 1 TABLET(S): 5; 325 TABLET ORAL at 09:17

## 2022-02-12 RX ADMIN — Medication 50 MILLILITER(S): at 13:20

## 2022-02-12 RX ADMIN — Medication 200 MILLIGRAM(S): at 22:15

## 2022-02-12 RX ADMIN — Medication 650 MILLIGRAM(S): at 17:28

## 2022-02-12 RX ADMIN — Medication 125 MILLIGRAM(S): at 14:35

## 2022-02-12 RX ADMIN — Medication 125 MILLIGRAM(S): at 18:01

## 2022-02-12 RX ADMIN — Medication 1 DROP(S): at 00:22

## 2022-02-12 RX ADMIN — Medication 650 MILLIGRAM(S): at 12:20

## 2022-02-12 RX ADMIN — Medication 1000 MILLIGRAM(S): at 00:23

## 2022-02-12 RX ADMIN — LEVETIRACETAM 500 MILLIGRAM(S): 250 TABLET, FILM COATED ORAL at 22:11

## 2022-02-12 RX ADMIN — OXYCODONE AND ACETAMINOPHEN 1 TABLET(S): 5; 325 TABLET ORAL at 09:47

## 2022-02-12 RX ADMIN — Medication 125 MILLIGRAM(S): at 00:24

## 2022-02-12 RX ADMIN — Medication 1 APPLICATION(S): at 22:25

## 2022-02-12 RX ADMIN — Medication 400 MILLIGRAM(S): at 19:57

## 2022-02-12 RX ADMIN — APIXABAN 2.5 MILLIGRAM(S): 2.5 TABLET, FILM COATED ORAL at 22:09

## 2022-02-12 RX ADMIN — Medication 650 MILLIGRAM(S): at 05:58

## 2022-02-12 RX ADMIN — Medication 1000 MILLIGRAM(S): at 20:37

## 2022-02-12 RX ADMIN — Medication 650 MILLIGRAM(S): at 00:23

## 2022-02-12 RX ADMIN — ERYTHROPOIETIN 10000 UNIT(S): 10000 INJECTION, SOLUTION INTRAVENOUS; SUBCUTANEOUS at 13:48

## 2022-02-12 RX ADMIN — Medication 1 DROP(S): at 22:19

## 2022-02-12 RX ADMIN — CHLORHEXIDINE GLUCONATE 1 APPLICATION(S): 213 SOLUTION TOPICAL at 05:21

## 2022-02-12 RX ADMIN — Medication 125 MILLIGRAM(S): at 05:20

## 2022-02-12 RX ADMIN — ONDANSETRON 4 MILLIGRAM(S): 8 TABLET, FILM COATED ORAL at 20:37

## 2022-02-12 RX ADMIN — OXYCODONE AND ACETAMINOPHEN 1 TABLET(S): 5; 325 TABLET ORAL at 01:00

## 2022-02-12 RX ADMIN — CASPOFUNGIN ACETATE 260 MILLIGRAM(S): 7 INJECTION, POWDER, LYOPHILIZED, FOR SOLUTION INTRAVENOUS at 14:37

## 2022-02-12 RX ADMIN — Medication 125 MILLIGRAM(S): at 23:37

## 2022-02-12 RX ADMIN — Medication 650 MILLIGRAM(S): at 12:50

## 2022-02-12 RX ADMIN — Medication 2 MILLIGRAM(S): at 05:58

## 2022-02-12 RX ADMIN — Medication 3 MILLIGRAM(S): at 22:31

## 2022-02-12 RX ADMIN — PANTOPRAZOLE SODIUM 40 MILLIGRAM(S): 20 TABLET, DELAYED RELEASE ORAL at 06:03

## 2022-02-12 RX ADMIN — Medication 12.5 MILLIGRAM(S): at 22:11

## 2022-02-12 RX ADMIN — OXYCODONE AND ACETAMINOPHEN 1 TABLET(S): 5; 325 TABLET ORAL at 00:56

## 2022-02-12 RX ADMIN — Medication 650 MILLIGRAM(S): at 06:04

## 2022-02-12 RX ADMIN — Medication 100 MILLIGRAM(S): at 16:10

## 2022-02-12 NOTE — CHART NOTE - NSCHARTNOTEFT_GEN_A_CORE
Called overnight Called overnight for hypoxia and elevated temperature. Pt was found to have T at 105, with SaO2 at 84 on room air. She is c/o of feeling hot  increased cough HPI: Pt is a pleasant 40 yo female w/PMH of ESRD (M/W/F), GERD, bacteremia, seizures, epilepsy, depression, COPD, HTN, heroin abuse, RA, Cdiff colitis in Dec 2021 smoker, recent admission for Aspiration Pna who  presents to [place]  ED via ambulance from dialysis center for brief loss of consciousness, possible seizure, now on keppra. She is currently being managed for MRSA bacteremia + fungemia, possible disseminated candidemia.    Called overnight for hypoxia and elevated temperature. Pt was found to have T at 105, with SaO2 at 84 on room air. She is c/o of feeling hot with increased cough that is non-productive, uncomfortable with breathing however no increased respiratory effort or distress. Pulmonary exam notable for slightly diminished lung sounds. Of note, pt missed dose of IV tylenol earlier.    ICU Vital Signs Last 24 Hrs  T(C): 38.1 (12 Feb 2022 22:27), Max: 40.6 (12 Feb 2022 20:18)  T(F): 100.5 (12 Feb 2022 22:27), Max: 105 (12 Feb 2022 20:18)  HR: 108 (12 Feb 2022 22:27) (102 - 147)  BP: 116/74 (12 Feb 2022 22:27) (109/84 - 167/90)  BP(mean): 109 (12 Feb 2022 07:36) (109 - 109)  ABP: --  ABP(mean): --  RR: 14 (12 Feb 2022 20:18) (14 - 22)  SpO2: 95% (12 Feb 2022 20:18) (84% - 96%)    PLAN  #Fever 2/2 MRSA bacteremia/fungemia in setting of missed tylenol dose vs new onset aspiration pneumonia  - C/w caspofungin + vanc  - C/w cooling blanket  - Ordered IV tylenol  - Ordered CXR  - Ordered lactate which was decreased from AM (6.0 >0.9)  - Ordered repeat CBC  - Ordered blood cultures

## 2022-02-12 NOTE — PROVIDER CONTACT NOTE (CRITICAL VALUE NOTIFICATION) - NAME OF MD/NP/PA/DO NOTIFIED:
Dr. Orantes
MD Donaldson
Md Hdzo
Dr. Donaldson
Dr. Armstrong
Dr. Hancock
Dr. Nicho Armstrong
MD Leon

## 2022-02-12 NOTE — PROGRESS NOTE ADULT - ASSESSMENT
40 yo F with ESRD on HD, presents with bacteremia s/p RIJ PC removal, in need of HD access.    Plan:  -f/u blood cultures - 2/4 MRSA, 2/6 & 2/8 NGTD, Will follow up final blood cultures prelim negative - blood cultures from 2/11 pending  -ID on board, recs appreciated  -c/w IV abx  -nephro on board, recs appreciated  -will need temporary HD access in the interim  -will plan for a right AVF creation vs right AVG with biologic on this admission once blood cultures negative earliest Monday evening  -continue to save right arm - no BP cuffs, IVs or blood draws  -medical management as per primary team    Plan discussed with Dr. Lynch

## 2022-02-12 NOTE — PROGRESS NOTE ADULT - SUBJECTIVE AND OBJECTIVE BOX
Patient was seen and evaluated at bedside. Patient tachycardic and febrile. Patient continues on heparin drip. VS reviewed.    Vital Signs Last 24 Hrs  T(C): 37.4 (12 Feb 2022 11:00), Max: 40.7 (11 Feb 2022 18:00)  T(F): 99.4 (12 Feb 2022 11:00), Max: 105.3 (11 Feb 2022 18:00)  HR: 124 (12 Feb 2022 12:05) (80 - 148)  BP: 141/85 (12 Feb 2022 12:05) (132/79 - 174/95)  BP(mean): 109 (12 Feb 2022 07:36) (109 - 109)  RR: 20 (12 Feb 2022 10:50) (16 - 20)  SpO2: 94% (12 Feb 2022 07:36) (94% - 97%)  Labs:                                8.2    13.04 )-----------( 230      ( 12 Feb 2022 10:50 )             24.4     CBC Full  -  ( 12 Feb 2022 10:50 )  WBC Count : 13.04 K/uL  RBC Count : 2.77 M/uL  Hemoglobin : 8.2 g/dL  Hematocrit : 24.4 %  Platelet Count - Automated : 230 K/uL  Mean Cell Volume : 88.1 fl  Mean Cell Hemoglobin : 29.6 pg  Mean Cell Hemoglobin Concentration : 33.6 gm/dL  Auto Neutrophil # : x  Auto Lymphocyte # : x  Auto Monocyte # : x  Auto Eosinophil # : x  Auto Basophil # : x  Auto Neutrophil % : x  Auto Lymphocyte % : x  Auto Monocyte % : x  Auto Eosinophil % : x  Auto Basophil % : x    02-12    126<L>  |  92<L>  |  28<H>  ----------------------------<  78  3.4<L>   |  21<L>  |  4.96<H>    Ca    7.8<L>      12 Feb 2022 10:50    TPro  6.1  /  Alb  1.8<L>  /  TBili  0.3  /  DBili  x   /  AST  7<L>  /  ALT  8<L>  /  AlkPhos  76  02-11    LIVER FUNCTIONS - ( 11 Feb 2022 08:08 )  Alb: 1.8 g/dL / Pro: 6.1 gm/dL / ALK PHOS: 76 U/L / ALT: 8 U/L / AST: 7 U/L / GGT: x           PTT - ( 11 Feb 2022 08:08 )  PTT:54.3 sec            Physical Exam:  General: AAOx3, in NAD  HEENT: NC/AT, EOMI, poor dentition  Cardio: S1S2, tachycardic  Pulm: equal air entry b/l, non labored  Chest: dressing over right chest c/d/i  Abdomen: soft, NT/ND  Extremities: multiple excoriations over all extremities

## 2022-02-12 NOTE — PROGRESS NOTE ADULT - ASSESSMENT
42 yo woman with ESRD on HD, rheumatoid arthritis, Raynauds, tobacco and Iv drug use disorder, admitted for MRSA bacteremia, now found to have yeast on blood cultures as well when cultures were repeated due to persisting fever despite removal of permcath and several doses of IV vanco.      Sepsis with organ dysfunction (elevated lactate) due to MRSA bacteremia and now likely fungemia  In setting of drug use disorder. ID following. On Vancomycin with HD. Started on Capsofungin today.  - Continue vanco and capsofungin  - Trend fever  - Trend lactate  - Monitor MAP, goal > 65  - Repeat cultures    Sinus tachycardia  In setting of acute infection.   - Continue to manage sepsis    ESRD on HD  -HD session today    CKD anemia  May also be further compounded by septicemia and fungemia.  - PRBC transfusion today with HD  - Continue epo    Renal bone disease  -Continue sevelamer    Hx of seizure  - On keppra    Rheumatoid arthritis  - Continue Plaquenil    Severe protein calorie malnutrition  Appreciate input from Nutrition  - Encourage po intake, trend weight      Diet: Renal  DVT px: Resume Eliquis 2/13  Code Status: Full

## 2022-02-12 NOTE — PROVIDER CONTACT NOTE (CRITICAL VALUE NOTIFICATION) - PERSON GIVING RESULT:
Serge
3E
CLARY Walter
Lab - Jamarcus Partida
Stuart Garduno/Lab
 Che
Arina/Lab
Lab
lab
Che/[facility]

## 2022-02-12 NOTE — CONSULT NOTE ADULT - SUBJECTIVE AND OBJECTIVE BOX
Patient is a 41y old  Female who presents with a chief complaint of Fever  Seizure  Sacral Decubitus  IVC Tip Thrombus  Abn CT Ab     HPI:  42 y/o female w/PMH of ESRD (M/W/F), GERD, bacteremia, seizures, epilepsy, depression, COPD, HTN, heroin abuse, RA, Cdiff colitis in Dec 2021 smoker, recent admission for Aspiration Pna who  presents to [place]  ED via ambulance from dialysis center for brief loss of consciousness, questionable seizure vs syncope.   EMS reported she had a 15 sec generalized seizure and was AAOx4 afterwards.  Pt states she was having shivering and not having a seizure.  She reported that her normal seizures involve her  whole body, unlike today.    She c/o  night sweats last night and chills this morning.  Today she had  1.5 hours of hemodialysis instead of her usual HD.   Pt denies cpain/SOB, no cough or resp complaints, no  abd pain.  She reported having a loose stool and vomiting x1 today and two episodes yesterday.  She has urine once a day and denies dysuria. (2022 17:35)      PMH: as above  PSH: as above  Meds: per reconciliation sheet, noted below  MEDICATIONS  (STANDING):  artificial tears (preservative free) Ophthalmic Solution 1 Drop(s) Both EYES two times a day  caspofungin IVPB      chlorhexidine 4% Liquid 1 Application(s) Topical <User Schedule>  collagenase Ointment 1 Application(s) Topical daily  epoetin amee-epbx (RETACRIT) Injectable 34533 Unit(s) IV Push <User Schedule>  gabapentin 100 milliGRAM(s) Oral three times a day  hydroxychloroquine 200 milliGRAM(s) Oral daily  levETIRAcetam 250 milliGRAM(s) Oral <User Schedule>  levETIRAcetam 500 milliGRAM(s) Oral daily  metoprolol tartrate 12.5 milliGRAM(s) Oral two times a day  pantoprazole    Tablet 40 milliGRAM(s) Oral before breakfast  sevelamer carbonate 800 milliGRAM(s) Oral three times a day with meals  vancomycin    Solution 125 milliGRAM(s) Oral every 6 hours  vancomycin  IVPB 500 milliGRAM(s) IV Intermittent once  vancomycin  IVPB 500 milliGRAM(s) IV Intermittent once    MEDICATIONS  (PRN):  acetaminophen     Tablet .. 650 milliGRAM(s) Oral every 6 hours PRN Temp greater or equal to 38.5C (101.3F), Mild Pain (1 - 3)  albumin human 25% IVPB 50 milliLiter(s) IV Intermittent every 1 hour PRN SBP less than 100  ALBUTerol    90 MICROgram(s) HFA Inhaler 2 Puff(s) Inhalation every 6 hours PRN Shortness of Breath and/or Wheezing  diphenhydrAMINE 25 milliGRAM(s) Oral every 6 hours PRN Rash and/or Itching  loperamide 2 milliGRAM(s) Oral every 6 hours PRN Diarrhea  melatonin 3 milliGRAM(s) Oral at bedtime PRN Insomnia  oxycodone    5 mG/acetaminophen 325 mG 1 Tablet(s) Oral every 8 hours PRN Severe Pain (7 - 10)  senna 2 Tablet(s) Oral at bedtime PRN Constipation  sodium chloride 0.9% lock flush 10 milliLiter(s) IV Push every 1 hour PRN Pre/post blood products, medications, blood draw, and to maintain line patency    Allergies    morphine (Rash)    Intolerances      Social: no smoking, no alcohol, no illegal drugs; no recent travel, no exposure to TB  FAMILY HISTORY:  Family history of cardiomyopathy (Mother)      no history of premature cardiovascular disease in first degree relatives    ROS: the patient denies fever, no chills, no HA, no seizures, no dizziness, no sore throat, no nasal congestion, no blurry vision, no CP, no palpitations, no SOB, no cough, no abdominal pain, no diarrhea, no N/V, no dysuria, no leg pain, no claudication, no rash, no joint aches, no rectal pain or bleeding, no night sweats  All other systems reviewed and are negative    Vital Signs Last 24 Hrs  T(C): 39.2 (2022 14:45), Max: 40.7 (2022 18:00)  T(F): 102.5 (2022 14:45), Max: 105.3 (2022 18:00)  HR: 135 (2022 14:45) (80 - 148)  BP: 163/98 (2022 14:45) (109/84 - 174/95)  BP(mean): 109 (2022 07:36) (109 - 109)  RR: 20 (2022 14:45) (16 - 22)  SpO2: 96% (2022 14:30) (94% - 97%)  Daily     Daily Weight in k (2022 06:17)    PE:    Constitutional:  No acute distress  HEENT: NC/AT, EOMI, PERRLA, conjunctivae clear; ears and nose atraumatic; pharynx benign  Neck: supple; thyroid not palpable  Back: no tenderness  Respiratory: respiratory effort normal; clear to auscultation  Cardiovascular: S1S2 regular, no murmurs  Abdomen: soft, not tender, not distended, positive BS; no liver or spleen organomegaly  Genitourinary: no suprapubic tenderness  Lymphatic: no LN palpable  Musculoskeletal: no muscle tenderness, no joint swelling or tenderness  Extremities: no pedal edema  Neurological/ Psychiatric: AxOx3, judgement and insight normal; moving all extremities  Skin: no rashes; no palpable lesions    Labs: all available labs reviewed                        8.2    13.04 )-----------( 230      ( 2022 10:50 )             24.4     02-12    126<L>  |  92<L>  |  28<H>  ----------------------------<  78  3.4<L>   |  21<L>  |  4.96<H>    Ca    7.8<L>      2022 10:50  Phos  4.5     02-12  Mg     1.7     02-12    TPro  6.1  /  Alb  1.8<L>  /  TBili  0.3  /  DBili  x   /  AST  7<L>  /  ALT  8<L>  /  AlkPhos  76  02-11     LIVER FUNCTIONS - ( 2022 08:08 )  Alb: 1.8 g/dL / Pro: 6.1 gm/dL / ALK PHOS: 76 U/L / ALT: 8 U/L / AST: 7 U/L / GGT: x                 COVID-19 PCR: NotDetec (02-10-22 @ 17:10)          Radiology: all available radiological tests reviewed    Advanced directives addressed: full resuscitation

## 2022-02-12 NOTE — PROGRESS NOTE ADULT - ASSESSMENT
40 yo female w/PMH of ESRD (M/W/F), GERD, bacteremia, seizures, epilepsy, depression, COPD, HTN, heroin abuse, RA, Cdiff colitis in Dec 2021 smoker, recent admission for Aspiration Pna who  presents to [place]  ED via ambulance from dialysis center for brief loss of consciousness, questionable seizure vs syncope.        ESRD on HD  last HD 2/7    attempt HD today after nontunelled catheter w IR then continue HD TTS    MRSA bacteremia - repeat cx NGTD    still w fevers **     Vascular following for access eventual Right AVF or AVG     hold on permacath until afebrile          HTN  - oral meds    Fevers ongoing   ID following workup      MRSA bacteremia    IV abx per ID     Anemia - on overt bleeding   chronic /infection ?   TAMARA at HD   PRBC today     * pt seen earlier today     d/w Dr Ugalde     2/12 SY  --ESRD : unable to complete tx yesterday due to hypotension with rigors.  HD today.  SPA to stabilize BP today.  --Anemia :likely hyporesponsive to TAMARA due to infection.  post one unit PRBC yesterday.  --MRSA bacteremia : post TDC removal .  HD via temporary catheter for now  --HTN : monitor off meds now.    Rusty Soni/ Jean to resume care 2/14.

## 2022-02-12 NOTE — PROGRESS NOTE ADULT - SUBJECTIVE AND OBJECTIVE BOX
Date of service: 02-12-22 @ 12:59    Events noted  Febrile to 105F yesterday  Reported with fungemia on BC collected on 2/11  New Shiley cath placed yesterday for HD    ROS: poorly verbal    MEDICATIONS  (STANDING):  artificial tears (preservative free) Ophthalmic Solution 1 Drop(s) Both EYES two times a day  caspofungin IVPB      chlorhexidine 4% Liquid 1 Application(s) Topical <User Schedule>  collagenase Ointment 1 Application(s) Topical daily  epoetin amee-epbx (RETACRIT) Injectable 33212 Unit(s) IV Push <User Schedule>  gabapentin 100 milliGRAM(s) Oral three times a day  hydroxychloroquine 200 milliGRAM(s) Oral daily  levETIRAcetam 500 milliGRAM(s) Oral daily  levETIRAcetam 250 milliGRAM(s) Oral <User Schedule>  metoprolol tartrate 12.5 milliGRAM(s) Oral two times a day  pantoprazole    Tablet 40 milliGRAM(s) Oral before breakfast  sevelamer carbonate 800 milliGRAM(s) Oral three times a day with meals  vancomycin    Solution 125 milliGRAM(s) Oral every 6 hours  vancomycin  IVPB 500 milliGRAM(s) IV Intermittent once  vancomycin  IVPB 500 milliGRAM(s) IV Intermittent once    Vital Signs Last 24 Hrs  T(C): 38.2 (12 Feb 2022 12:30), Max: 40.7 (11 Feb 2022 18:00)  T(F): 100.8 (12 Feb 2022 12:30), Max: 105.3 (11 Feb 2022 18:00)  HR: 119 (12 Feb 2022 12:30) (80 - 148)  BP: 109/84 (12 Feb 2022 12:30) (109/84 - 174/95)  BP(mean): 109 (12 Feb 2022 07:36) (109 - 109)  RR: 22 (12 Feb 2022 12:30) (16 - 22)  SpO2: 94% (12 Feb 2022 07:36) (94% - 97%)     Physical exam:    Constitutional: frail looking  HEENT: NC/AT, EOMI, PERRLA, conjunctivae clear; ears and nose atraumatic;  Neck: supple; thyroid not palpable  Shiley cath present  Back: no tenderness  Respiratory: respiratory effort normal; clear to auscultation  Cardiovascular: S1S2 regular, no murmurs  Abdomen: soft, not tender, not distended, positive BS; liver and spleen WNL  Genitourinary: no suprapubic tenderness  Lymphatic: no LN palpable  Musculoskeletal: no muscle tenderness, no joint swelling or tenderness  Extremities: no pedal edema  Neurological/ Psychiatric: AxOx3, Judgement and insight normal;  moving all extremities  Skin: no rashes; no palpable lesions, stage IV sacral ulcer wound clean no drainage    Labs: reviewed                        8.2    13.04 )-----------( 230      ( 12 Feb 2022 10:50 )             24.4     02-12    126<L>  |  92<L>  |  28<H>  ----------------------------<  78  3.4<L>   |  21<L>  |  4.96<H>    Ca    7.8<L>      12 Feb 2022 10:50  Phos  4.5     02-12  Mg     1.7     02-12    TPro  6.1  /  Alb  1.8<L>  /  TBili  0.3  /  DBili  x   /  AST  7<L>  /  ALT  8<L>  /  AlkPhos  76  02-11    Vancomycin Level, Random: 6.4 ug/mL (02-12 @ 12:00)  Vancomycin Level, Trough: 19.9 ug/mL (02-10 @ 16:43)    C-Reactive Protein, Serum: 289 mg/L (02-04-22 @ 14:06)                                   6.7    5.19  )-----------( 230      ( 11 Feb 2022 08:08 )             21.0     02-11    129<L>  |  97  |  32<H>  ----------------------------<  87  3.4<L>   |  19<L>  |  6.13<H>    Ca    8.0<L>      11 Feb 2022 08:08    TPro  6.1  /  Alb  1.8<L>  /  TBili  0.3  /  DBili  x   /  AST  7<L>  /  ALT  8<L>  /  AlkPhos  76  02-11      Vancomycin Level, Trough: 19.9 ug/mL (02-10 @ 16:43)  Vancomycin Level, Trough: 21.2 ug/mL (02-09 @ 13:51)    Culture - Blood (collected 11 Feb 2022 08:08)  Source: .Blood None  Gram Stain (12 Feb 2022 12:48):    Growth in aerobic bottle: Yeast like cells  Preliminary Report (12 Feb 2022 12:48):    Growth in aerobic bottle: Yeast like cells      Culture - Blood (collected 08 Feb 2022 09:22)  Source: .Blood None  Preliminary Report (09 Feb 2022 15:05):    No growth to date.    Culture - Blood (collected 08 Feb 2022 09:22)  Source: .Blood None  Preliminary Report (09 Feb 2022 15:05):    No growth to date.    Culture - Blood (collected 06 Feb 2022 11:51)  Source: .Blood None  Final Report (11 Feb 2022 19:00):    No Growth Final    Culture - Blood (collected 06 Feb 2022 11:51)  Source: .Blood None  Final Report (11 Feb 2022 19:00):    No Growth Final    Culture - Blood (collected 04 Feb 2022 14:06)  Source: .Blood Blood-Peripheral  Gram Stain (05 Feb 2022 10:52):    Growth in aerobic bottle: Gram Positive Cocci in Clusters  Final Report (07 Feb 2022 07:37):    Growth in aerobic bottle: Methicillin Resistant Staphylococcus aureus  Organism: Blood Culture PCR  Methicillin resistant Staphylococcus aureus (07 Feb 2022 07:37)  Organism: Methicillin resistant Staphylococcus aureus (07 Feb 2022 07:37)      -  Ampicillin/Sulbactam: R 16/8      -  Cefazolin: R 16      -  Clindamycin: S <=0.25      -  Daptomycin: S 0.5      -  Erythromycin: R >4      -  Gentamicin: S <=1 Should not be used as monotherapy      -  Linezolid: S 2      -  Oxacillin: R >2      -  Penicillin: R >8      -  Rifampin: S <=1 Should not be used as monotherapy      -  Tetra/Doxy: S <=1      -  Trimethoprim/Sulfamethoxazole: S <=0.5/9.5      -  Vancomycin: S 1      Method Type: KRYSTEN  Organism: Blood Culture PCR (07 Feb 2022 07:37)      -  Methicillin resistant Staphylococcus aureus (MRSA): Detec      Method Type: PCR        Radiology: all available radiological tests reviewed        Questionable mild hyperenhancement of the urethra. Correlate with   urinalysis.    Findings were discussed with Dr. Epperson at 4:37 pm on 2/4/2022.        PROCEDURE DATE:  02/04/2022          INTERPRETATION:  CLINICAL INFORMATION: Infected decubitus ulcer    COMPARISON: 1/21/2022, 1/18/2022.    CONTRAST/COMPLICATIONS:  IV Contrast: Omnipaque 350  90 cc administered   10 cc discarded  Oral Contrast: NONE  Complications: None reported at time of study completion    PROCEDURE:  CT of the Abdomen and Pelvis was performed.  Sagittal and coronal reformats were performed.    FINDINGS:  LOWER CHEST: Within normal limits.    LIVER: There is thrombus surrounding the catheter tip in the intrahepatic   IVC.  BILE DUCTS: Normal caliber.  GALLBLADDER: Cholecystectomy.  SPLEEN: Borderline enlarged.  PANCREAS: Within normal limits.  ADRENALS: Within normal limits.  KIDNEYS/URETERS: Within normal limits.    BLADDER: Within normal limits. Question mild hyperenhancement of the   urethra.  REPRODUCTIVE ORGANS: Uterus and adnexa within normal limits.    BOWEL: No bowel obstruction. Large amount of fluid/stool throughout the   colon. Question mild thickening versus underdistention of the sigmoid   colon. Appendix is not visualized. No evidence of inflammation in the   pericecal region.  PERITONEUM: No ascites.  VESSELS: Within the intrahepatic IVC, as described above.  RETROPERITONEUM/LYMPH NODES: Multiple enlarged retroperitoneal nodes. . A   left para-aortic node measures 1.9 x 1.4 cm (2:54). A left common iliac   node measures 2.4 x 1.3 cm (2:75)  ABDOMINAL WALL: Left sacral decubitus ulcer without definite evidence of   osseous erosion.  BONES: Degenerative changes. Degenerative changes of the right hip.    IMPRESSION:  There is thrombus surrounding the catheter tip in the intrahepatic IVC.    Retroperitoneal adenopathy, as described above. Differential includes   lymphoma.    Mildly distended, fluid-filled colon. Mild thickening versus   underdistention of the sigmoid colon.    Left sacral decubitus ulcer without definite osseous erosion. Correlate   with MRI if clinically warranted.    Additional findings as above.      Advanced directives addressed: full resuscitation

## 2022-02-12 NOTE — PROGRESS NOTE ADULT - ASSESSMENT
40 yo female w/PMH of ESRD (M/W/F), GERD, bacteremia, seizures, epilepsy, depression, COPD, HTN, heroin abuse, RA, Cdiff colitis in Dec 2021 smoker, recent admission for Aspiration Pna who  presents to [facility]  [facility] via ambulance from dialysis center for brief loss of consciousness, questionable seizure vs syncope.   EMS reported she had a 15 sec generalized seizure and was AAOx4 afterwards. Pt states she was having shivering and not having a seizure.  She reported that her normal seizures involve her  whole body, unlike day of admit.  She c/o  night sweats last night and chills this morning. Day of admit she had  1.5 hours of hemodialysis instead of her usual HD.   Pt denies cpain/SOB, no cough or resp complaints, no  abd pain.  She reported having a loose stool and vomiting x1 and two episodes day prior to admit. She has urine once a day and denies dysuria. Here noted with MRSA in blood cx. Has perm-catheter in place. Hx of substance abuse.    1. Fungemia. Possible disseminated candidemia. MRSA sepsis/bacteremia. source ? catheter ? sacral decub wound?  IVC thrombus. hx substance abuse. ESRD  - fever; perm cath site clean - line removed 2/7 replaced on 2/11  -likley cath related blood stream infection  - no diarrhea, if she develops diarrhea start empiric oral vancomycin as hx cdad  - s/p perm-catheter removal 2/7   - s/p vancomycin x 2 day #7 of antibiotics  - re-dosed vancomycin post HD on 2/11  - repeat blood cx 2/6, 2/8 no growth so far;  -star cancidas IV   - TTE no obvious vegetations   - wound care eval for sacral decub - wound clean  -may need this new line removed as well if persistent fungemia  - monitor temps  - tolerating abx well so far; no side effects noted  - reason for abx use and side effects reviewed with patient  - supportive care  - fu cbc    2. other issues - care per medicine

## 2022-02-12 NOTE — PROVIDER CONTACT NOTE (CRITICAL VALUE NOTIFICATION) - ACTION/TREATMENT ORDERED:
Vanco previously discontinued. no new orders.
MD to contact Critical Care for higher level of care transfer

## 2022-02-12 NOTE — PROVIDER CONTACT NOTE (CRITICAL VALUE NOTIFICATION) - TEST AND RESULT REPORTED:
HH 6.7/21.0
Lactate 6.1
Troponin- 71.52
Blood culture 2/4 positive for growth anerobic bottle  MRSA
Lactate 10
Lactate 6
blood culture from 2/11/22 preliminary result growth in aerobic bottle yeast like cells
Blood culture aerobic bottle gram + cocci in clusters
vanco trough 27.5
Vanco trough - 27.6

## 2022-02-12 NOTE — PROGRESS NOTE ADULT - SUBJECTIVE AND OBJECTIVE BOX
NEPHROLOGY INTERVAL HPI/OVERNIGHT EVENTS:    Date of Service: 22 @ 11:34    Covering for Rusty Soni/raeann    --Resting comfortably currently   Fever to 102 on cooling blanket--down to 98.7   Denies SOB.    HPI :   42 yo female w/PMH of ESRD (M/W/F), GERD, bacteremia, seizures, epilepsy, depression, COPD, HTN, heroin abuse, RA, Cdiff colitis in Dec 2021 smoker, recent admission for Aspiration Pna who  presents to [facility]  ED via ambulance from dialysis center for brief loss of consciousness, questionable seizure vs syncope.  Only got about 1.5 hours of HD on friday.       MEDICATIONS  (STANDING):  artificial tears (preservative free) Ophthalmic Solution 1 Drop(s) Both EYES two times a day  chlorhexidine 4% Liquid 1 Application(s) Topical <User Schedule>  collagenase Ointment 1 Application(s) Topical daily  epoetin amee-epbx (RETACRIT) Injectable 41395 Unit(s) IV Push <User Schedule>  gabapentin 100 milliGRAM(s) Oral three times a day  hydroxychloroquine 200 milliGRAM(s) Oral daily  levETIRAcetam 250 milliGRAM(s) Oral <User Schedule>  levETIRAcetam 500 milliGRAM(s) Oral daily  metoprolol tartrate 12.5 milliGRAM(s) Oral two times a day  pantoprazole    Tablet 40 milliGRAM(s) Oral before breakfast  sevelamer carbonate 800 milliGRAM(s) Oral three times a day with meals  vancomycin    Solution 125 milliGRAM(s) Oral every 6 hours  vancomycin  IVPB 500 milliGRAM(s) IV Intermittent once  vancomycin  IVPB 500 milliGRAM(s) IV Intermittent once    MEDICATIONS  (PRN):  acetaminophen     Tablet .. 650 milliGRAM(s) Oral every 6 hours PRN Temp greater or equal to 38.5C (101.3F), Mild Pain (1 - 3)  albumin human 25% IVPB 50 milliLiter(s) IV Intermittent every 1 hour PRN SBP less than 100  ALBUTerol    90 MICROgram(s) HFA Inhaler 2 Puff(s) Inhalation every 6 hours PRN Shortness of Breath and/or Wheezing  diphenhydrAMINE 25 milliGRAM(s) Oral every 6 hours PRN Rash and/or Itching  loperamide 2 milliGRAM(s) Oral every 6 hours PRN Diarrhea  melatonin 3 milliGRAM(s) Oral at bedtime PRN Insomnia  oxycodone    5 mG/acetaminophen 325 mG 1 Tablet(s) Oral every 8 hours PRN Severe Pain (7 - 10)  senna 2 Tablet(s) Oral at bedtime PRN Constipation  sodium chloride 0.9% lock flush 10 milliLiter(s) IV Push every 1 hour PRN Pre/post blood products, medications, blood draw, and to maintain line patency    Vital Signs Last 24 Hrs  T(C): 37.4 (2022 11:00), Max: 40.7 (2022 18:00)  T(F): 99.4 (2022 11:00), Max: 105.3 (2022 18:00)  HR: 102 (2022 11:00) (80 - 148)  BP: 140/91 (2022 11:00) (135/85 - 174/95)  BP(mean): 109 (2022 07:36) (109 - 109)  RR: 18 (2022 07:36) (16 - 20)  SpO2: 94% (2022 07:36) (94% - 97%)  Daily     Daily Weight in k (2022 06:17)     @ 07:  -   @ 07:00  --------------------------------------------------------  IN: 281 mL / OUT: 0 mL / NET: 281 mL     @ 07:  -   @ 11:34  --------------------------------------------------------  IN: 480 mL / OUT: 1 mL / NET: 479 mL    PHYSICAL EXAM:  GENERAL: no acute distress.  CHEST/LUNG: fair air entry  HEART: S1S2 RRR  ABDOMEN: soft  EXTREMITIES:  no edema  SKIN:     LABS:                        6.7    5.19  )-----------( 230      ( 2022 08:08 )             21.0     02-11    129<L>  |  97  |  32<H>  ----------------------------<  87  3.4<L>   |  19<L>  |  6.13<H>    Ca    8.0<L>      2022 08:08    TPro  6.1  /  Alb  1.8<L>  /  TBili  0.3  /  DBili  x   /  AST  7<L>  /  ALT  8<L>  /  AlkPhos  76  02-11    PTT - ( 2022 08:08 )  PTT:54.3 sec            RADIOLOGY & ADDITIONAL TESTS:

## 2022-02-12 NOTE — PROGRESS NOTE ADULT - SUBJECTIVE AND OBJECTIVE BOX
Chief Complaint:    Interval History:    ROS:    Physical Exam:  T(F): 101.9 (12 Feb 2022 07:36), Max: 105.3 (11 Feb 2022 18:00)  HR: 114 (12 Feb 2022 07:36) (80 - 148)  BP: 167/90 (12 Feb 2022 07:36) (135/85 - 174/95)  RR: 18 (12 Feb 2022 07:36) (16 - 20)  SpO2: 94% (12 Feb 2022 07:36) (94% - 97%)    Gen:  HEENT:  Neck:  Chest:  CVS:  Abd:  Ext:  Skin:  Neuro:  Mood:    Labs:    Micro:    Imaging:    Cardiac Testing:    Meds:      Diet:  DVT px:  Code Status:   Emergency Contact:  Dispo:   Chief Complaint: Loss of consciousness    Interval History: Interval removal of permcath, placement of temporary dialysis catheter. Has been receiving vancomycin post HD for MRSA bacteremia. Today she continues to have fever, rigors, tachycardia, anemia. BP holding steady WNL. Labs reviewed. Bld cultures updated, patient with fungemia. ID made aware and patient started on caspofungin. Tylenol given for fever. Deferred IV fluid boluses as patient was on dialysis today receiving volume in form of PRBC transfusions for Hgb 6.7. Lactate elevated 6.1. VBG pH 7.31. Requested patient be transferred to CICU/Stepdown however ICU and Stepdown advised that patient can continue to be managed on 3E. Advised to continue management of fungemia, septicemia.     ROS: Multi system review is notable for fever, malaise, general debility otherwise negative x 10 systems    Physical Exam:  T(F): 101.9 (12 Feb 2022 07:36), Max: 105.3 (11 Feb 2022 18:00)  HR: 114 (12 Feb 2022 07:36) (80 - 148)  BP: 167/90 (12 Feb 2022 07:36) (135/85 - 174/95)  RR: 18 (12 Feb 2022 07:36) (16 - 20)  SpO2: 94% (12 Feb 2022 07:36) (94% - 97%)    GEN: Ill-appearing, shivering a bit  HEENT: NCAT PERRL EOMI  NECK: Supple, symmetric and without tracheal deviation;   RESP: CTA b/l, no wheezes, rales or rhonch  CARD: S1 S2 normal, tachycardic to 100s, regular  ABD: Soft, non tender, non distended, no rebound, no guarding  EXT: No calf tenderness, no erythema  SKIN: Warm, dry  NEURO: Alert, awake CN II-XII intact; normal reflexes in upper and lower extremities, sensation intact in upper and lower extremities b/l to light touch     Labs:                       8.2    13.04 )------( 230              24.4       126  |  92  |  28  --------------------<  78  3.4   |  21  |  4.96    Ca 7.8  Phos 4.5  Mg 1.7    TPro  6.1  /  Alb  1.8  /  TBili  0.3  /  DBili  x   /  AST  7  /  ALT  8  /  AlkPhos  76      Lactate, Blood: 6.1 mmol/L     Micro:  Bld Cx 2/11: Pos for yeast  COVID19 PCR 2/10: Neg  Blood culture 2/8: Negative  Blood culture 2/6: Negative  Blood culture 2/4: MRSA    Imaging:  Reviewed    Cardiac Testing:  TTE 2/8 : The left ventricle is normal in size. Moderately reduced left ventricular systolic function. Estimated left ventricular ejection fraction is 40%. Normal appearing right atrium. A catheter wire is seen in the right atrium. Normal aortic valve structure and function. Trivial aortic regurgitation is present. Normal appearing mitral valve structure and function. Mild (1+) mitral regurgitation is present.    Meds:  MEDICATIONS  (STANDING):  artificial tears (preservative free) Ophthalmic Solution 1 Drop(s) Both EYES two times a day  caspofungin IVPB      chlorhexidine 4% Liquid 1 Application(s) Topical <User Schedule>  collagenase Ointment 1 Application(s) Topical daily  epoetin amee-epbx (RETACRIT) Injectable 83088 Unit(s) IV Push <User Schedule>  gabapentin 100 milliGRAM(s) Oral three times a day  hydroxychloroquine 200 milliGRAM(s) Oral daily  levETIRAcetam 250 milliGRAM(s) Oral <User Schedule>  levETIRAcetam 500 milliGRAM(s) Oral daily  metoprolol tartrate 12.5 milliGRAM(s) Oral two times a day  pantoprazole    Tablet 40 milliGRAM(s) Oral before breakfast  sevelamer carbonate 800 milliGRAM(s) Oral three times a day with meals  vancomycin    Solution 125 milliGRAM(s) Oral every 6 hours  vancomycin  IVPB 500 milliGRAM(s) IV Intermittent once    MEDICATIONS  (PRN):  acetaminophen     Tablet .. 650 milliGRAM(s) Oral every 6 hours PRN Temp greater or equal to 38.5C (101.3F), Mild Pain (1 - 3)  acetaminophen   IVPB .. 1000 milliGRAM(s) IV Intermittent once PRN Temp greater or equal to 38.5C (101.3F)  albumin human 25% IVPB 50 milliLiter(s) IV Intermittent every 1 hour PRN SBP less than 100  ALBUTerol    90 MICROgram(s) HFA Inhaler 2 Puff(s) Inhalation every 6 hours PRN Shortness of Breath and/or Wheezing  diphenhydrAMINE 25 milliGRAM(s) Oral every 6 hours PRN Rash and/or Itching  loperamide 2 milliGRAM(s) Oral every 6 hours PRN Diarrhea  melatonin 3 milliGRAM(s) Oral at bedtime PRN Insomnia  oxycodone    5 mG/acetaminophen 325 mG 1 Tablet(s) Oral every 8 hours PRN Severe Pain (7 - 10)  senna 2 Tablet(s) Oral at bedtime PRN Constipation  sodium chloride 0.9% lock flush 10 milliLiter(s) IV Push every 1 hour PRN Pre/post blood products, medications, blood draw, and to maintain line patency

## 2022-02-13 LAB
C DIFF BY PCR RESULT: SIGNIFICANT CHANGE UP
C DIFF TOX GENS STL QL NAA+PROBE: SIGNIFICANT CHANGE UP
CULTURE RESULTS: SIGNIFICANT CHANGE UP
CULTURE RESULTS: SIGNIFICANT CHANGE UP
SARS-COV-2 RNA SPEC QL NAA+PROBE: SIGNIFICANT CHANGE UP
SPECIMEN SOURCE: SIGNIFICANT CHANGE UP
SPECIMEN SOURCE: SIGNIFICANT CHANGE UP

## 2022-02-13 PROCEDURE — 99232 SBSQ HOSP IP/OBS MODERATE 35: CPT

## 2022-02-13 RX ORDER — ACETAMINOPHEN 500 MG
650 TABLET ORAL ONCE
Refills: 0 | Status: COMPLETED | OUTPATIENT
Start: 2022-02-13 | End: 2022-02-13

## 2022-02-13 RX ORDER — VANCOMYCIN HCL 1 G
500 VIAL (EA) INTRAVENOUS ONCE
Refills: 0 | Status: COMPLETED | OUTPATIENT
Start: 2022-02-13 | End: 2022-02-13

## 2022-02-13 RX ADMIN — Medication 125 MILLIGRAM(S): at 12:01

## 2022-02-13 RX ADMIN — Medication 125 MILLIGRAM(S): at 17:04

## 2022-02-13 RX ADMIN — Medication 125 MILLIGRAM(S): at 05:32

## 2022-02-13 RX ADMIN — OXYCODONE AND ACETAMINOPHEN 1 TABLET(S): 5; 325 TABLET ORAL at 21:16

## 2022-02-13 RX ADMIN — Medication 100 MILLIGRAM(S): at 17:04

## 2022-02-13 RX ADMIN — Medication 650 MILLIGRAM(S): at 18:11

## 2022-02-13 RX ADMIN — APIXABAN 2.5 MILLIGRAM(S): 2.5 TABLET, FILM COATED ORAL at 20:46

## 2022-02-13 RX ADMIN — Medication 260 MILLIGRAM(S): at 03:25

## 2022-02-13 RX ADMIN — Medication 125 MILLIGRAM(S): at 23:51

## 2022-02-13 RX ADMIN — Medication 12.5 MILLIGRAM(S): at 20:47

## 2022-02-13 RX ADMIN — Medication 200 MILLIGRAM(S): at 09:58

## 2022-02-13 RX ADMIN — OXYCODONE AND ACETAMINOPHEN 1 TABLET(S): 5; 325 TABLET ORAL at 12:01

## 2022-02-13 RX ADMIN — Medication 12.5 MILLIGRAM(S): at 10:00

## 2022-02-13 RX ADMIN — CHLORHEXIDINE GLUCONATE 1 APPLICATION(S): 213 SOLUTION TOPICAL at 05:35

## 2022-02-13 RX ADMIN — PANTOPRAZOLE SODIUM 40 MILLIGRAM(S): 20 TABLET, DELAYED RELEASE ORAL at 05:33

## 2022-02-13 RX ADMIN — Medication 3 MILLIGRAM(S): at 20:48

## 2022-02-13 RX ADMIN — CASPOFUNGIN ACETATE 260 MILLIGRAM(S): 7 INJECTION, POWDER, LYOPHILIZED, FOR SOLUTION INTRAVENOUS at 12:01

## 2022-02-13 RX ADMIN — LEVETIRACETAM 500 MILLIGRAM(S): 250 TABLET, FILM COATED ORAL at 09:59

## 2022-02-13 RX ADMIN — APIXABAN 2.5 MILLIGRAM(S): 2.5 TABLET, FILM COATED ORAL at 09:59

## 2022-02-13 RX ADMIN — OXYCODONE AND ACETAMINOPHEN 1 TABLET(S): 5; 325 TABLET ORAL at 20:46

## 2022-02-13 RX ADMIN — SEVELAMER CARBONATE 800 MILLIGRAM(S): 2400 POWDER, FOR SUSPENSION ORAL at 17:04

## 2022-02-13 RX ADMIN — OXYCODONE AND ACETAMINOPHEN 1 TABLET(S): 5; 325 TABLET ORAL at 12:31

## 2022-02-13 RX ADMIN — Medication 1 APPLICATION(S): at 13:37

## 2022-02-13 NOTE — PROGRESS NOTE ADULT - REASON FOR ADMISSION
Fever  Seizure  Sacral Decubitus  IVC Tip Thrombus  Abn CT Ab Sepsis with organ dysfunction (elevated lactate) Episode of loss of consciousness, sepsis with organ dysfunction (elevated lactate), dialysis catheter tip thrombus

## 2022-02-13 NOTE — PROGRESS NOTE ADULT - SUBJECTIVE AND OBJECTIVE BOX
Patient was seen and evaluated at bedside. Patient tachycardic and febrile. Patient continues on heparin drip. VS reviewed.    Vital Signs Last 24 Hrs  T(C): 37.1 (13 Feb 2022 14:10), Max: 40.6 (12 Feb 2022 20:18)  T(F): 98.8 (13 Feb 2022 14:10), Max: 105 (12 Feb 2022 20:18)  HR: 105 (13 Feb 2022 10:10) (105 - 139)  BP: 139/86 (13 Feb 2022 10:10) (116/74 - 146/83)  BP(mean): --  RR: 18 (13 Feb 2022 10:10) (14 - 18)  SpO2: 97% (13 Feb 2022 10:10) (84% - 97%)  Labs:                                8.2    13.04 )-----------( 230      ( 12 Feb 2022 10:50 )             24.4     CBC Full  -  ( 12 Feb 2022 10:50 )  WBC Count : 13.04 K/uL  RBC Count : 2.77 M/uL  Hemoglobin : 8.2 g/dL  Hematocrit : 24.4 %  Platelet Count - Automated : 230 K/uL  Mean Cell Volume : 88.1 fl  Mean Cell Hemoglobin : 29.6 pg  Mean Cell Hemoglobin Concentration : 33.6 gm/dL  Auto Neutrophil # : x  Auto Lymphocyte # : x  Auto Monocyte # : x  Auto Eosinophil # : x  Auto Basophil # : x  Auto Neutrophil % : x  Auto Lymphocyte % : x  Auto Monocyte % : x  Auto Eosinophil % : x  Auto Basophil % : x    02-12    126<L>  |  92<L>  |  28<H>  ----------------------------<  78  3.4<L>   |  21<L>  |  4.96<H>    Ca    7.8<L>      12 Feb 2022 10:50  Phos  4.5     02-12  Mg     1.7     02-12                Physical Exam:  General: AAOx3, in NAD  HEENT: NC/AT, EOMI, poor dentition  Cardio: S1S2, tachycardic  Pulm: equal air entry b/l, non labored  Chest: dressing over right chest c/d/i  Abdomen: soft, NT/ND  Extremities: multiple excoriations over all extremities

## 2022-02-13 NOTE — PROCEDURE NOTE - NSPROCDETAILS_GEN_ALL_CORE
location identified, draped/prepped, sterile technique used/blood seen on insertion/dressing applied/flushes easily/secured in place/sterile technique, catheter placed
location identified, draped/prepped, sterile technique used/blood seen on insertion/secured in place/sterile technique, catheter placed

## 2022-02-13 NOTE — CDI QUERY NOTE - NSCDIOTHERTXTBX_GEN_ALL_CORE_HH
This patient was admitted with bacteremia and a clot near the IVC:    Progress Note Adult-Hospitalist Attending 2-7-22 @ 10:46  now with clot near IVC  #MRSA  Bacteremia   - had cultures drawn in ER which  positive for MRSA   - Nephrology Planning to remove permacath   Ordering Echo     # Stage 4 Sacral Ulcer present on admission   Frequent turn   Wound care daily , on Collagenase     Infectious Disease Attending 2-7-22 @ 11:03  1. MRSA sepsis/bacteremia. source ? catheter ? sacral decub wound?  hx substance abuse. ESRD  - s/p vancomycin x 1 2/5 level 24 - will re-dose 500mg post HD today    This patient seems to have met 4 SIRS criteria upon admission:  WBC Count: 14.17 K/uL (02.04.22 @ 14:06)  HR: 136  Temp: 103.5  RR: 22    Possible source of infections: Permacath, stage 4 pressure ulcer.    Can the diagnosis of sepsis be clarified?  A) Sepsis, present on admission, likely due to permacath and stage 4 pressure ulcer.  B) Sepsis, developed after admission, please state etiology if known.  C) No clinical indication of sepsis.  D) Unable to determine. This patient was admitted with bacteremia and a clot near the IVC:    Progress Note Adult-Hospitalist Attending 2-7-22 @ 10:46  now with clot near IVC  #MRSA  Bacteremia   - had cultures drawn in ER which  positive for MRSA   - Nephrology Planning to remove permacath   Ordering Echo     # Stage 4 Sacral Ulcer present on admission   Frequent turn   Wound care daily , on Collagenase     Infectious Disease Attending 2-7-22 @ 11:03  1. MRSA sepsis/bacteremia. source ? catheter ? sacral decub wound?  hx substance abuse. ESRD  - s/p vancomycin x 1 2/5 level 24 - will re-dose 500mg post HD today    PN 2/13 reveals : " Sepsis with organ dysfunction (elevated lactate) due to MRSA bacteremia and now likely fungemia.  Dialysis catheter tip thrombus  Catheter since removed due to MRSA bacteremia and sepsis "    This patient seems to have met 4 SIRS criteria upon admission:  WBC Count: 14.17 K/uL (02.04.22 @ 14:06)  HR: 136  Temp: 103.5  RR: 22    Possible source of infections: Permacath, stage 4 pressure ulcer.    Can the diagnosis and etiology  of sepsis be further clarified?  A) Sepsis, present on admission,  due to permacath   B) Sepsis, present on admission,  due to stage 4 pressure ulcer.  C) Sepsis, developed after admission, please state etiology if known.  D) Other clinical source of sepsis, please clarify  E) Unable to determine. This patient was admitted with bacteremia and a clot near the IVC:    Progress Note Adult-Hospitalist Attending 2-7-22 @ 10:46  now with clot near IVC  #MRSA  Bacteremia   - had cultures drawn in ER which  positive for MRSA   - Nephrology Planning to remove permacath   Ordering Echo     # Stage 4 Sacral Ulcer present on admission   Frequent turn   Wound care daily , on Collagenase     Infectious Disease Attending 2-7-22 @ 11:03  1. MRSA sepsis/bacteremia. source ? catheter ? sacral decub wound?  hx substance abuse. ESRD  - s/p vancomycin x 1 2/5 level 24 - will re-dose 500mg post HD today    PN 2/13 reveals : " Sepsis with organ dysfunction (elevated lactate) due to MRSA bacteremia and now likely fungemia.  Dialysis catheter tip thrombus  Catheter since removed due to MRSA bacteremia and sepsis "    Per ID on 2/14: MRSA sepsis/bacteremia. Catheter-related infection.    This patient seems to have met 4 SIRS criteria upon admission:  WBC Count: 14.17 K/uL (02.04.22 @ 14:06)  HR: 136  Temp: 103.5  RR: 22    Possible source of infections: Permacath, stage 4 pressure ulcer.    Can the diagnosis and etiology  of sepsis be further clarified?  A) Sepsis, present on admission,  due to permacath   B) Sepsis, present on admission,  due to stage 4 pressure ulcer.  C) Sepsis, developed after admission, please state etiology if known.  D) Other clinical source of sepsis, please clarify  E) Unable to determine.

## 2022-02-13 NOTE — PROGRESS NOTE ADULT - ASSESSMENT
42 yo female w/PMH of ESRD (M/W/F), GERD, bacteremia, seizures, epilepsy, depression, COPD, HTN, heroin abuse, RA, Cdiff colitis in Dec 2021 smoker, recent admission for Aspiration Pna who  presents to [place] [facility]  [facility] via ambulance from dialysis center for brief loss of consciousness, questionable seizure vs syncope.        ESRD on HD  last HD 2/7    attempt HD today after nontunelled catheter w IR then continue HD TTS    MRSA bacteremia - repeat cx NGTD    still w fevers **     Vascular following for access eventual Right AVF or AVG     hold on permacath until afebrile          HTN  - oral meds    Fevers ongoing   ID following workup      MRSA bacteremia    IV abx per ID     Anemia - on overt bleeding   chronic /infection ?   TAMARA at HD   PRBC today     * pt seen earlier today     d/w Dr Ugalde     2/12 SY  --ESRD : unable to complete tx yesterday due to hypotension with rigors.  HD today.  SPA to stabilize BP today.  --Anemia :likely hyporesponsive to TAMARA due to infection.  post one unit PRBC yesterday.  --MRSA bacteremia : post TDC removal .  HD via temporary catheter for now  --HTN : monitor off meds now.    2/13 SY  --ESRD : better tolerated tx yesterday.   Plan for tx in am again.  --Anemia : post 2 units.  follow up labs.  --ID : MRSA bacteremia and Candidemia : continue Vanco and Caspofungin.    D/w Dr Hancock.   No immediate need to remove HD catheter.  placed on 2/11.    Rusty Soni/ Jean to resume care 2/14.

## 2022-02-13 NOTE — PROGRESS NOTE ADULT - SUBJECTIVE AND OBJECTIVE BOX
Chief Complaint: Loss of consciousness    Interval History: Interval removal of permcath, placement of temporary dialysis catheter. Has been receiving vancomycin post HD for MRSA bacteremia. Today she continues to have fever, rigors, tachycardia, anemia. BP holding steady WNL. Labs reviewed. Bld cultures updated, patient with fungemia. ID made aware and patient started on caspofungin. Tylenol given for fever. Deferred IV fluid boluses as patient was on dialysis today receiving volume in form of PRBC transfusions for Hgb 6.7. Lactate elevated 6.1. VBG pH 7.31. Requested patient be transferred to CICU/Stepdown however ICU and Stepdown advised that patient can continue to be managed on 3E. Advised to continue management of fungemia, septicemia.     2/13 Reports feeling improved. No longer with rigors. Improvement in fever curve noted. Lactate normalized.     ROS: Multi system review is notable for fever, malaise, general debility otherwise negative x 10 systems    Physical Exam:  T(F): 101.9 (12 Feb 2022 07:36), Max: 105.3 (11 Feb 2022 18:00)  HR: 114 (12 Feb 2022 07:36) (80 - 148)  BP: 167/90 (12 Feb 2022 07:36) (135/85 - 174/95)  RR: 18 (12 Feb 2022 07:36) (16 - 20)  SpO2: 94% (12 Feb 2022 07:36) (94% - 97%)    GEN: Ill-appearing, shivering a bit  HEENT: NCAT PERRL EOMI  NECK: Supple, symmetric and without tracheal deviation;   RESP: CTA b/l, no wheezes, rales or rhonch  CARD: S1 S2 normal, tachycardic to 100s, regular  ABD: Soft, non tender, non distended, no rebound, no guarding  EXT: No calf tenderness, no erythema  SKIN: Warm, dry  NEURO: Alert, awake CN II-XII intact; normal reflexes in upper and lower extremities, sensation intact in upper and lower extremities b/l to light touch     Labs:                       8.2    13.04 )------( 230              24.4       126  |  92  |  28  --------------------<  78  3.4   |  21  |  4.96    Ca 7.8  Phos 4.5  Mg 1.7    TPro  6.1  /  Alb  1.8  /  TBili  0.3  /  DBili  x   /  AST  7  /  ALT  8  /  AlkPhos  76      Lactate, Blood: 6.1 mmol/L     Micro:  Bld Cx 2/11: Pos for yeast  COVID19 PCR 2/10: Neg  Blood culture 2/8: Negative  Blood culture 2/6: Negative  Blood culture 2/4: MRSA    Imaging:  Reviewed    Cardiac Testing:  TTE 2/8 : The left ventricle is normal in size. Moderately reduced left ventricular systolic function. Estimated left ventricular ejection fraction is 40%. Normal appearing right atrium. A catheter wire is seen in the right atrium. Normal aortic valve structure and function. Trivial aortic regurgitation is present. Normal appearing mitral valve structure and function. Mild (1+) mitral regurgitation is present.    Meds:  MEDICATIONS  (STANDING):  artificial tears (preservative free) Ophthalmic Solution 1 Drop(s) Both EYES two times a day  caspofungin IVPB      chlorhexidine 4% Liquid 1 Application(s) Topical <User Schedule>  collagenase Ointment 1 Application(s) Topical daily  epoetin amee-epbx (RETACRIT) Injectable 40775 Unit(s) IV Push <User Schedule>  gabapentin 100 milliGRAM(s) Oral three times a day  hydroxychloroquine 200 milliGRAM(s) Oral daily  levETIRAcetam 250 milliGRAM(s) Oral <User Schedule>  levETIRAcetam 500 milliGRAM(s) Oral daily  metoprolol tartrate 12.5 milliGRAM(s) Oral two times a day  pantoprazole    Tablet 40 milliGRAM(s) Oral before breakfast  sevelamer carbonate 800 milliGRAM(s) Oral three times a day with meals  vancomycin    Solution 125 milliGRAM(s) Oral every 6 hours  vancomycin  IVPB 500 milliGRAM(s) IV Intermittent once    MEDICATIONS  (PRN):  acetaminophen     Tablet .. 650 milliGRAM(s) Oral every 6 hours PRN Temp greater or equal to 38.5C (101.3F), Mild Pain (1 - 3)  acetaminophen   IVPB .. 1000 milliGRAM(s) IV Intermittent once PRN Temp greater or equal to 38.5C (101.3F)  albumin human 25% IVPB 50 milliLiter(s) IV Intermittent every 1 hour PRN SBP less than 100  ALBUTerol    90 MICROgram(s) HFA Inhaler 2 Puff(s) Inhalation every 6 hours PRN Shortness of Breath and/or Wheezing  diphenhydrAMINE 25 milliGRAM(s) Oral every 6 hours PRN Rash and/or Itching  loperamide 2 milliGRAM(s) Oral every 6 hours PRN Diarrhea  melatonin 3 milliGRAM(s) Oral at bedtime PRN Insomnia  oxycodone    5 mG/acetaminophen 325 mG 1 Tablet(s) Oral every 8 hours PRN Severe Pain (7 - 10)  senna 2 Tablet(s) Oral at bedtime PRN Constipation  sodium chloride 0.9% lock flush 10 milliLiter(s) IV Push every 1 hour PRN Pre/post blood products, medications, blood draw, and to maintain line patency Chief Complaint: Loss of consciousness    Interval History: Yesterday, patient noted to have persisting fever, rigors, tachycardia, anemia, elevated lactate, and blood cultures positive for yeast. ID re-evaluated patient and she was placed on Caspofungin and treated supportively with PRBC for anemia, intravascular volume improvement, Tylenol for fever, etc. Today patient reports feeling improved. No longer with rigors. Improvement in fever curve noted. Did have some loose stools overnight. Cdiff PCR checked, negative. Serum lactate from late last night has normalized. Repeat blood cultures in-process. Unable to obtain labs so far today as patient is difficult still and requesting to defer labs.     ROS: Multi system review is notable for fever, malaise, general debility otherwise negative x 10 systems    Physical Exam:  T(F): 100.1 (13 Feb 2022 20:50)  HR: 101 (13 Feb 2022 20:50)   BP: 157/90 (13 Feb 2022 20:50)   RR: 18 (13 Feb 2022 20:50)  SpO2: 98% (13 Feb 2022 20:50)    GEN: Less ill-appearing today  HEENT: NCAT PERRL EOMI  NECK: Supple, symmetric and without tracheal deviation;   CHEST: CTA b/l, no wheezes, rales or rhonchi  CVS: S1 S2 normal, tachycardic to 100  ABD: Soft, non tender, non distended, no rebound, no guarding  EXT: No calf tenderness, no erythema  SKIN: Warm, dry  NEURO: Alert, awake, grossly without deficits    Labs:                       8.2    13.04 )------( 230              24.4       126  |  92  |  28  --------------------<  78  3.4   |  21  |  4.96    Ca 7.8  Phos 4.5  Mg 1.7    TPro  6.1  /  Alb  1.8  /  TBili  0.3  /  DBili  x   /  AST  7  /  ALT  8  /  AlkPhos  76      Lactate 6.1 --> 0.9    Micro:  COVID19 PCR 2/13: Negative  Cdiff PCR 2/12: Negative  Blood culture 2/12: In-process  Blood culture 2/11: Pos for yeast, In-process  COVID19 PCR 2/10: Neg  Blood culture 2/8: Negative  Blood culture 2/6: Negative  Blood culture 2/4: MRSA    Imaging:  CT chest 2/12: Near complete resolution of previously identified mucoid debris in bronchus intermedius and bilateral lower lobe bronchi with tree-in-bud nodularity, right greater than left. No consolidation or pleural effusion. Asymmetric right breast density with nonspecific soft tissue infiltration extending to right chest wall.    CT abd/pelvis 2/12: Rectal tube identified, tip at the level of the rectosigmoid junction containing contrast. Mild wall thickening of the rectosigmoid junction. No bowel obstruction. Reidentified bulky retroperitoneal lymphadenopathy with cluster of multiple enlarged left paraaortic and pelvic lymph nodes, stable compared to recent prior study.    Cardiac Testing:  TTE 2/8 : The left ventricle is normal in size. Moderately reduced left ventricular systolic function. Estimated left ventricular ejection fraction is 40%. Normal appearing right atrium. A catheter wire is seen in the right atrium. Normal aortic valve structure and function. Trivial aortic regurgitation is present. Normal appearing mitral valve structure and function. Mild (1+) mitral regurgitation is present.    Meds:  MEDICATIONS  (STANDING):  artificial tears (preservative free) Ophthalmic Solution 1 Drop(s) Both EYES two times a day  caspofungin IVPB      chlorhexidine 4% Liquid 1 Application(s) Topical <User Schedule>  collagenase Ointment 1 Application(s) Topical daily  epoetin amee-epbx (RETACRIT) Injectable 97127 Unit(s) IV Push <User Schedule>  gabapentin 100 milliGRAM(s) Oral three times a day  hydroxychloroquine 200 milliGRAM(s) Oral daily  levETIRAcetam 250 milliGRAM(s) Oral <User Schedule>  levETIRAcetam 500 milliGRAM(s) Oral daily  metoprolol tartrate 12.5 milliGRAM(s) Oral two times a day  pantoprazole    Tablet 40 milliGRAM(s) Oral before breakfast  sevelamer carbonate 800 milliGRAM(s) Oral three times a day with meals  vancomycin    Solution 125 milliGRAM(s) Oral every 6 hours  vancomycin  IVPB 500 milliGRAM(s) IV Intermittent once    MEDICATIONS  (PRN):  acetaminophen     Tablet .. 650 milliGRAM(s) Oral every 6 hours PRN Temp greater or equal to 38.5C (101.3F), Mild Pain (1 - 3)  acetaminophen   IVPB .. 1000 milliGRAM(s) IV Intermittent once PRN Temp greater or equal to 38.5C (101.3F)  albumin human 25% IVPB 50 milliLiter(s) IV Intermittent every 1 hour PRN SBP less than 100  ALBUTerol    90 MICROgram(s) HFA Inhaler 2 Puff(s) Inhalation every 6 hours PRN Shortness of Breath and/or Wheezing  diphenhydrAMINE 25 milliGRAM(s) Oral every 6 hours PRN Rash and/or Itching  loperamide 2 milliGRAM(s) Oral every 6 hours PRN Diarrhea  melatonin 3 milliGRAM(s) Oral at bedtime PRN Insomnia  oxycodone    5 mG/acetaminophen 325 mG 1 Tablet(s) Oral every 8 hours PRN Severe Pain (7 - 10)  senna 2 Tablet(s) Oral at bedtime PRN Constipation  sodium chloride 0.9% lock flush 10 milliLiter(s) IV Push every 1 hour PRN Pre/post blood products, medications, blood draw, and to maintain line patency

## 2022-02-13 NOTE — PROGRESS NOTE ADULT - ASSESSMENT
42 yo woman with ESRD on HD, rheumatoid arthritis, Raynauds, tobacco and Iv drug use disorder, admitted for MRSA bacteremia, now found to have yeast on blood cultures as well when cultures were repeated due to persisting fever despite removal of permcath and several doses of IV vanco.      Sepsis with organ dysfunction (elevated lactate) due to MRSA bacteremia and now likely fungemia  In setting of drug use disorder. ID following. On Vancomycin with HD. Started on Capsofungin today.  - Continue vanco and capsofungin  - Trend fever  - Trend lactate  - Monitor MAP, goal > 65  - Repeat cultures    Sinus tachycardia  In setting of acute infection.   - Continue to manage sepsis    ESRD on HD  -HD session today    CKD anemia  May also be further compounded by septicemia and fungemia.  - PRBC transfusion today with HD  - Continue epo    Renal bone disease  -Continue sevelamer    Hx of seizure  - On keppra    Rheumatoid arthritis  - Continue Plaquenil    Severe protein calorie malnutrition  Appreciate input from Nutrition  - Encourage po intake, trend weight      Diet: Renal  DVT px: Resume Eliquis 2/13  Code Status: Full     42 yo woman with ESRD on HD, rheumatoid arthritis, Raynauds, tobacco and IV drug use disorder, admitted for eisode of loss of consciousness, sepsis with organ dysfunction, and dialysis catheter tip thrombus, found to have MRSA bacteremia, now found to also have yeast on blood cultures when cultures were repeated due to persisting fever despite removal of permcath and several doses of IV vancomycin.       Sepsis with organ dysfunction (elevated lactate) due to MRSA bacteremia and now likely fungemia  In setting of IV drug use disorder. ID following. On Vancomycin with HD. Started on Capsofungin 2/12, day 2 today. Fever curve improved. Lactic acidosis resolved.   - Continue vanco and capsofungin  - Monitor for fever, maintain MAP > 65  - F/u repeat cultures    Sinus tachycardia  In setting of acute infection.   - Continue to manage sepsis    Dialysis catheter tip thrombus  Catheter since removed due to MRSA bacteremia and sepsis. On Eliquis  - Continue Eliquis    ESRD on HD  Receiving dialysis sessions as per Nephrology, currently via temporary HD catheter as patients R IJ permcath was removed this admission in setting of sepsis, bacteremia. Will need HD access. Vascular Surgery following.   - Continue dialysis sessions, anticipate next session 2/14  - Continue to save R arm (no BP cuffs, IVs or blood draws) and will plan for AVF creation when patient's acute infection resolves    CKD anemia  May also be further compounded by septicemia and fungemia. S/P PRBC transfusion with HD 2/12  - Continue epo    Renal bone disease  -Continue sevelamer    Hx of seizure  - On Keppra    Rheumatoid arthritis  - Continue Plaquenil    Abnormal imaging of the breast  As noted on CT chest 2/12. Asymmetric right breast density with nonspecific soft tissue infiltration extending to right chest wall.   - Patient will need non-emergent breast imaging correlation to exclude underlying malignancy including inflammatory carcinoma.    Retroperitoneal lymphadenopathy with a cluster of multiple enlarged left paraaortic and pelvic lymph nodes  As noted on CT abd/pelvis 2/12 and imaging prior. Rather stable in size.   - Patient will need continued follow up regarding these findings    Severe protein calorie malnutrition  Appreciate input from Nutrition  - Encourage po intake, trend weight      Diet: Renal  DVT px: Eliquis  Code Status: Full     40 yo woman with ESRD on HD, rheumatoid arthritis, Raynauds, tobacco and IV drug use disorder, admitted for eisode of loss of consciousness, sepsis with organ dysfunction, and dialysis catheter tip thrombus, found to have MRSA bacteremia, now found to also have yeast on blood cultures when cultures were repeated due to persisting fever despite removal of permcath and several doses of IV vancomycin.       Sepsis with organ dysfunction (elevated lactate) due to MRSA bacteremia and now likely fungemia  In setting of IV drug use disorder. ID following. On Vancomycin with HD. Started on Caspofungin 2/12, day 2 today. Fever curve improved. Lactic acidosis resolved.   - Continue vanco and caspofungin  - Monitor for fever, maintain MAP > 65  - F/u repeat cultures    Sinus tachycardia  In setting of acute infection.   - Continue to manage sepsis    Dialysis catheter tip thrombus  Catheter since removed due to MRSA bacteremia and sepsis. On Eliquis  - Continue Eliquis    ESRD on HD  Receiving dialysis sessions as per Nephrology, currently via temporary HD catheter as patients R IJ permcath was removed this admission in setting of sepsis, bacteremia. Will need HD access. Vascular Surgery following.   - Continue dialysis sessions, anticipate next session 2/14  - Continue to save R arm (no BP cuffs, IVs or blood draws) and will plan for AVF creation when patient's acute infection resolves    CKD anemia  May also be further compounded by septicemia and fungemia. S/P PRBC transfusion with HD 2/12  - Continue epo    Renal bone disease  -Continue sevelamer    Hx of seizure  - On Keppra    Rheumatoid arthritis  - Continue Plaquenil    Abnormal imaging of the breast  As noted on CT chest 2/12. Asymmetric right breast density with nonspecific soft tissue infiltration extending to right chest wall.   - Patient will need non-emergent breast imaging correlation to exclude underlying malignancy including inflammatory carcinoma.    Retroperitoneal lymphadenopathy with a cluster of multiple enlarged left paraaortic and pelvic lymph nodes  As noted on CT abd/pelvis 2/12 and imaging prior. Rather stable in size.   - Patient will need continued follow up regarding these findings    Severe protein calorie malnutrition  Appreciate input from Nutrition  - Encourage po intake, trend weight      Diet: Renal  DVT px: Eliquis  Code Status: Full

## 2022-02-13 NOTE — PROGRESS NOTE ADULT - ASSESSMENT
40 yo F with ESRD on HD, presents with bacteremia s/p RIJ PC removal, in need of HD access.    Plan:  -blood cultures from 2/11 show MRSA bacteremia and fungemia  -ID on board, recs appreciated  -c/w IV abx  -nephro on board, recs appreciated  -will need temporary HD access but as patient is bacteremic and fungemic, patient cannot undergo surgery for fistula creation at this time  -continue to save right arm - no BP cuffs, IVs or blood draws  -will plan for AVF creation when patient acute infection resolves  -medical management as per primary team    Plan discussed with Dr. Lynch

## 2022-02-13 NOTE — PROGRESS NOTE ADULT - SUBJECTIVE AND OBJECTIVE BOX
NEPHROLOGY INTERVAL HPI/OVERNIGHT EVENTS:    Date of Service: 22 @ 13:28    Covering for Rusty Soni/reaann  --Able to complete tx yesterday though with rigors at end and tachycardia through tx.  BP stable. Tmax 105.  On cooling blanket.  Blood culture positive for Candida Albicans.  --Resting comfortably currently   Fever to 102 on cooling blanket--down to 98.7   Denies SOB.    HPI :   40 yo female w/PMH of ESRD (M/W/F), GERD, bacteremia, seizures, epilepsy, depression, COPD, HTN, heroin abuse, RA, Cdiff colitis in Dec 2021 smoker, recent admission for Aspiration Pna who  presents to [facility]  [facility] via ambulance from dialysis center for brief loss of consciousness, questionable seizure vs syncope.  Only got about 1.5 hours of HD on friday.       MEDICATIONS  (STANDING):  apixaban 2.5 milliGRAM(s) Oral two times a day  artificial tears (preservative free) Ophthalmic Solution 1 Drop(s) Both EYES two times a day  caspofungin IVPB 50 milliGRAM(s) IV Intermittent every 24 hours  caspofungin IVPB      chlorhexidine 4% Liquid 1 Application(s) Topical <User Schedule>  collagenase Ointment 1 Application(s) Topical daily  epoetin amee-epbx (RETACRIT) Injectable 83136 Unit(s) IV Push <User Schedule>  gabapentin 100 milliGRAM(s) Oral three times a day  hydroxychloroquine 200 milliGRAM(s) Oral daily  levETIRAcetam 250 milliGRAM(s) Oral <User Schedule>  levETIRAcetam 500 milliGRAM(s) Oral daily  metoprolol tartrate 12.5 milliGRAM(s) Oral two times a day  pantoprazole    Tablet 40 milliGRAM(s) Oral before breakfast  sevelamer carbonate 800 milliGRAM(s) Oral three times a day with meals  vancomycin    Solution 125 milliGRAM(s) Oral every 6 hours  vancomycin  IVPB 500 milliGRAM(s) IV Intermittent once  vancomycin  IVPB 500 milliGRAM(s) IV Intermittent once    MEDICATIONS  (PRN):  acetaminophen     Tablet .. 650 milliGRAM(s) Oral every 6 hours PRN Temp greater or equal to 38.5C (101.3F), Mild Pain (1 - 3)  acetaminophen   IVPB .. 1000 milliGRAM(s) IV Intermittent once PRN Temp greater or equal to 38.5C (101.3F)  albumin human 25% IVPB 50 milliLiter(s) IV Intermittent every 1 hour PRN SBP less than 100  ALBUTerol    90 MICROgram(s) HFA Inhaler 2 Puff(s) Inhalation every 6 hours PRN Shortness of Breath and/or Wheezing  diphenhydrAMINE 25 milliGRAM(s) Oral every 6 hours PRN Rash and/or Itching  loperamide 2 milliGRAM(s) Oral every 6 hours PRN Diarrhea  melatonin 3 milliGRAM(s) Oral at bedtime PRN Insomnia  oxycodone    5 mG/acetaminophen 325 mG 1 Tablet(s) Oral every 8 hours PRN Severe Pain (7 - 10)  senna 2 Tablet(s) Oral at bedtime PRN Constipation  sodium chloride 0.9% lock flush 10 milliLiter(s) IV Push every 1 hour PRN Pre/post blood products, medications, blood draw, and to maintain line patency    Vital Signs Last 24 Hrs  T(C): 37.8 (2022 10:10), Max: 40.6 (2022 20:18)  T(F): 100 (2022 10:10), Max: 105 (2022 20:18)  HR: 105 (2022 10:10) (105 - 139)  BP: 139/86 (2022 10:10) (116/74 - 163/98)  BP(mean): --  RR: 18 (2022 10:10) (14 - 20)  SpO2: 97% (2022 10:10) (84% - 97%)  Daily     Daily Weight in k (2022 06:37)    12 @ 07:01  -  -13 @ 07:00  --------------------------------------------------------  IN: 1599 mL / OUT: 290 mL / NET: 1309 mL    PHYSICAL EXAM:  GENERAL: no distress  CHEST/LUNG: scattered rhonchi  HEART: S1S2 RRR  ABDOMEN: soft  EXTREMITIES: trace edema  SKIN:     LABS:                        8.2    13.04 )-----------( 230      ( 2022 10:50 )             24.4     02-12    126<L>  |  92<L>  |  28<H>  ----------------------------<  78  3.4<L>   |  21<L>  |  4.96<H>    Ca    7.8<L>      2022 10:50  Phos  4.5       Mg     1.7                       RADIOLOGY & ADDITIONAL TESTS:

## 2022-02-14 LAB
HCT VFR BLD CALC: 23.9 % — LOW (ref 34.5–45)
HGB BLD-MCNC: 8.1 G/DL — LOW (ref 11.5–15.5)
MCHC RBC-ENTMCNC: 29.9 PG — SIGNIFICANT CHANGE UP (ref 27–34)
MCHC RBC-ENTMCNC: 33.9 GM/DL — SIGNIFICANT CHANGE UP (ref 32–36)
MCV RBC AUTO: 88.2 FL — SIGNIFICANT CHANGE UP (ref 80–100)
OB PNL STL: NEGATIVE — SIGNIFICANT CHANGE UP
PLATELET # BLD AUTO: 198 K/UL — SIGNIFICANT CHANGE UP (ref 150–400)
RBC # BLD: 2.71 M/UL — LOW (ref 3.8–5.2)
RBC # FLD: 15.9 % — HIGH (ref 10.3–14.5)
WBC # BLD: 11.27 K/UL — HIGH (ref 3.8–10.5)
WBC # FLD AUTO: 11.27 K/UL — HIGH (ref 3.8–10.5)

## 2022-02-14 PROCEDURE — 99232 SBSQ HOSP IP/OBS MODERATE 35: CPT

## 2022-02-14 RX ADMIN — PANTOPRAZOLE SODIUM 40 MILLIGRAM(S): 20 TABLET, DELAYED RELEASE ORAL at 08:23

## 2022-02-14 RX ADMIN — OXYCODONE AND ACETAMINOPHEN 1 TABLET(S): 5; 325 TABLET ORAL at 23:06

## 2022-02-14 RX ADMIN — Medication 125 MILLIGRAM(S): at 05:48

## 2022-02-14 RX ADMIN — Medication 650 MILLIGRAM(S): at 19:46

## 2022-02-14 RX ADMIN — CASPOFUNGIN ACETATE 260 MILLIGRAM(S): 7 INJECTION, POWDER, LYOPHILIZED, FOR SOLUTION INTRAVENOUS at 12:24

## 2022-02-14 RX ADMIN — APIXABAN 2.5 MILLIGRAM(S): 2.5 TABLET, FILM COATED ORAL at 10:28

## 2022-02-14 RX ADMIN — LEVETIRACETAM 500 MILLIGRAM(S): 250 TABLET, FILM COATED ORAL at 10:27

## 2022-02-14 RX ADMIN — Medication 1 APPLICATION(S): at 11:00

## 2022-02-14 RX ADMIN — SEVELAMER CARBONATE 800 MILLIGRAM(S): 2400 POWDER, FOR SUSPENSION ORAL at 12:23

## 2022-02-14 RX ADMIN — Medication 200 MILLIGRAM(S): at 10:27

## 2022-02-14 RX ADMIN — Medication 1 DROP(S): at 21:53

## 2022-02-14 RX ADMIN — LEVETIRACETAM 250 MILLIGRAM(S): 250 TABLET, FILM COATED ORAL at 16:05

## 2022-02-14 RX ADMIN — SEVELAMER CARBONATE 800 MILLIGRAM(S): 2400 POWDER, FOR SUSPENSION ORAL at 16:54

## 2022-02-14 RX ADMIN — APIXABAN 2.5 MILLIGRAM(S): 2.5 TABLET, FILM COATED ORAL at 21:00

## 2022-02-14 RX ADMIN — OXYCODONE AND ACETAMINOPHEN 1 TABLET(S): 5; 325 TABLET ORAL at 06:17

## 2022-02-14 RX ADMIN — Medication 12.5 MILLIGRAM(S): at 10:27

## 2022-02-14 RX ADMIN — Medication 650 MILLIGRAM(S): at 10:29

## 2022-02-14 RX ADMIN — OXYCODONE AND ACETAMINOPHEN 1 TABLET(S): 5; 325 TABLET ORAL at 15:04

## 2022-02-14 RX ADMIN — Medication 1 DROP(S): at 11:00

## 2022-02-14 RX ADMIN — OXYCODONE AND ACETAMINOPHEN 1 TABLET(S): 5; 325 TABLET ORAL at 05:47

## 2022-02-14 RX ADMIN — SEVELAMER CARBONATE 800 MILLIGRAM(S): 2400 POWDER, FOR SUSPENSION ORAL at 08:21

## 2022-02-14 RX ADMIN — OXYCODONE AND ACETAMINOPHEN 1 TABLET(S): 5; 325 TABLET ORAL at 23:36

## 2022-02-14 RX ADMIN — Medication 3 MILLIGRAM(S): at 21:00

## 2022-02-14 RX ADMIN — CHLORHEXIDINE GLUCONATE 1 APPLICATION(S): 213 SOLUTION TOPICAL at 05:48

## 2022-02-14 RX ADMIN — Medication 12.5 MILLIGRAM(S): at 21:01

## 2022-02-14 RX ADMIN — Medication 650 MILLIGRAM(S): at 21:16

## 2022-02-14 RX ADMIN — Medication 650 MILLIGRAM(S): at 13:09

## 2022-02-14 NOTE — PROGRESS NOTE ADULT - SUBJECTIVE AND OBJECTIVE BOX
Date of service: 02-14-22 @ 10:43    Pt seen and examined  Febrile to 101 yesterday evening  Temps down this am  Reports some loose stool    ROS:  denies dizziness, no HA, no SOB or cough, no abdominal pain, no constipation; no dysuria, no urinary frequency, no legs pain, no rashes    MEDICATIONS  (STANDING):  apixaban 2.5 milliGRAM(s) Oral two times a day  artificial tears (preservative free) Ophthalmic Solution 1 Drop(s) Both EYES two times a day  caspofungin IVPB 50 milliGRAM(s) IV Intermittent every 24 hours  caspofungin IVPB      chlorhexidine 4% Liquid 1 Application(s) Topical <User Schedule>  collagenase Ointment 1 Application(s) Topical daily  epoetin amee-epbx (RETACRIT) Injectable 76171 Unit(s) IV Push <User Schedule>  gabapentin 100 milliGRAM(s) Oral three times a day  hydroxychloroquine 200 milliGRAM(s) Oral daily  levETIRAcetam 250 milliGRAM(s) Oral <User Schedule>  levETIRAcetam 500 milliGRAM(s) Oral daily  metoprolol tartrate 12.5 milliGRAM(s) Oral two times a day  pantoprazole    Tablet 40 milliGRAM(s) Oral before breakfast  sevelamer carbonate 800 milliGRAM(s) Oral three times a day with meals  vancomycin    Solution 125 milliGRAM(s) Oral every 6 hours  vancomycin  IVPB 500 milliGRAM(s) IV Intermittent once    Vital Signs Last 24 Hrs  T(C): 37.5 (14 Feb 2022 09:08), Max: 38.3 (13 Feb 2022 17:00)  T(F): 99.5 (14 Feb 2022 09:08), Max: 101 (13 Feb 2022 17:00)  HR: 103 (14 Feb 2022 09:08) (101 - 103)  BP: 137/104 (14 Feb 2022 09:08) (137/104 - 157/90)  BP(mean): --  RR: 19 (14 Feb 2022 09:08) (18 - 19)  SpO2: 100% (14 Feb 2022 09:08) (98% - 100%)    Physical exam:  Constitutional: frail looking  HEENT: NC/AT, EOMI, PERRLA, conjunctivae clear; ears and nose atraumatic;  Neck: supple; thyroid not palpable  Shiley cath present  Back: no tenderness  Respiratory: respiratory effort normal; clear to auscultation  Cardiovascular: S1S2 regular, no murmurs  Abdomen: soft, not tender, not distended, positive BS; liver and spleen WNL  Genitourinary: no suprapubic tenderness  Lymphatic: no LN palpable  Musculoskeletal: no muscle tenderness, no joint swelling or tenderness  Extremities: no pedal edema  Neurological/ Psychiatric: AxOx3, Judgement and insight normal;  moving all extremities  Skin: no rashes; no palpable lesions, stage IV sacral ulcer wound clean no drainage    Labs: reviewed                                       8.1    11.27 )-----------( 198      ( 14 Feb 2022 07:19 )             23.9     02-12    126<L>  |  92<L>  |  28<H>  ----------------------------<  78  3.4<L>   |  21<L>  |  4.96<H>    Ca    7.8<L>      12 Feb 2022 10:50  Phos  4.5     02-12  Mg     1.7     02-12      Vancomycin Level, Trough: 19.9 ug/mL (02-10 @ 16:43)  Vancomycin Level, Trough: 21.2 ug/mL (02-09 @ 13:51)    Culture - Blood (02.11.22 @ 08:08)   Gram Stain:   Growth in aerobic bottle: Yeast like cells   - Candida albicans: Detec     Culture - Blood (collected 08 Feb 2022 09:22)  Source: .Blood None  Preliminary Report (09 Feb 2022 15:05):    No growth to date.    Culture - Blood (collected 08 Feb 2022 09:22)  Source: .Blood None  Preliminary Report (09 Feb 2022 15:05):    No growth to date.    Culture - Blood (collected 06 Feb 2022 11:51)  Source: .Blood None  Final Report (11 Feb 2022 19:00):    No Growth Final    Culture - Blood (collected 06 Feb 2022 11:51)  Source: .Blood None  Final Report (11 Feb 2022 19:00):    No Growth Final    Culture - Blood (collected 04 Feb 2022 14:06)  Source: .Blood Blood-Peripheral  Gram Stain (05 Feb 2022 10:52):    Growth in aerobic bottle: Gram Positive Cocci in Clusters  Final Report (07 Feb 2022 07:37):    Growth in aerobic bottle: Methicillin Resistant Staphylococcus aureus  Organism: Blood Culture PCR  Methicillin resistant Staphylococcus aureus (07 Feb 2022 07:37)  Organism: Methicillin resistant Staphylococcus aureus (07 Feb 2022 07:37)      -  Ampicillin/Sulbactam: R 16/8      -  Cefazolin: R 16      -  Clindamycin: S <=0.25      -  Daptomycin: S 0.5      -  Erythromycin: R >4      -  Gentamicin: S <=1 Should not be used as monotherapy      -  Linezolid: S 2      -  Oxacillin: R >2      -  Penicillin: R >8      -  Rifampin: S <=1 Should not be used as monotherapy      -  Tetra/Doxy: S <=1      -  Trimethoprim/Sulfamethoxazole: S <=0.5/9.5      -  Vancomycin: S 1      Method Type: KRYSTEN  Organism: Blood Culture PCR (07 Feb 2022 07:37)      -  Methicillin resistant Staphylococcus aureus (MRSA): Detec      Method Type: PCR        Radiology: all available radiological tests reviewed        Questionable mild hyperenhancement of the urethra. Correlate with   urinalysis.    Findings were discussed with Dr. Epperson at 4:37 pm on 2/4/2022.        PROCEDURE DATE:  02/04/2022          INTERPRETATION:  CLINICAL INFORMATION: Infected decubitus ulcer    COMPARISON: 1/21/2022, 1/18/2022.    CONTRAST/COMPLICATIONS:  IV Contrast: Omnipaque 350  90 cc administered   10 cc discarded  Oral Contrast: NONE  Complications: None reported at time of study completion    PROCEDURE:  CT of the Abdomen and Pelvis was performed.  Sagittal and coronal reformats were performed.    FINDINGS:  LOWER CHEST: Within normal limits.    LIVER: There is thrombus surrounding the catheter tip in the intrahepatic   IVC.  BILE DUCTS: Normal caliber.  GALLBLADDER: Cholecystectomy.  SPLEEN: Borderline enlarged.  PANCREAS: Within normal limits.  ADRENALS: Within normal limits.  KIDNEYS/URETERS: Within normal limits.    BLADDER: Within normal limits. Question mild hyperenhancement of the   urethra.  REPRODUCTIVE ORGANS: Uterus and adnexa within normal limits.    BOWEL: No bowel obstruction. Large amount of fluid/stool throughout the   colon. Question mild thickening versus underdistention of the sigmoid   colon. Appendix is not visualized. No evidence of inflammation in the   pericecal region.  PERITONEUM: No ascites.  VESSELS: Within the intrahepatic IVC, as described above.  RETROPERITONEUM/LYMPH NODES: Multiple enlarged retroperitoneal nodes. . A   left para-aortic node measures 1.9 x 1.4 cm (2:54). A left common iliac   node measures 2.4 x 1.3 cm (2:75)  ABDOMINAL WALL: Left sacral decubitus ulcer without definite evidence of   osseous erosion.  BONES: Degenerative changes. Degenerative changes of the right hip.    IMPRESSION:  There is thrombus surrounding the catheter tip in the intrahepatic IVC.    Retroperitoneal adenopathy, as described above. Differential includes   lymphoma.    Mildly distended, fluid-filled colon. Mild thickening versus   underdistention of the sigmoid colon.    Left sacral decubitus ulcer without definite osseous erosion. Correlate   with MRI if clinically warranted.    Additional findings as above.      Advanced directives addressed: full resuscitation

## 2022-02-14 NOTE — PROGRESS NOTE ADULT - SUBJECTIVE AND OBJECTIVE BOX
Patient was seen and evaluated at bedside. Patient vitals improved yesterday.    Vital Signs Last 24 Hrs  T(C): 37.5 (14 Feb 2022 09:08), Max: 38.3 (13 Feb 2022 17:00)  T(F): 99.5 (14 Feb 2022 09:08), Max: 101 (13 Feb 2022 17:00)  HR: 103 (14 Feb 2022 09:08) (101 - 105)  BP: 137/104 (14 Feb 2022 09:08) (137/104 - 157/90)  BP(mean): --  RR: 19 (14 Feb 2022 09:08) (18 - 19)  SpO2: 100% (14 Feb 2022 09:08) (97% - 100%)  Labs:                                8.1    11.27 )-----------( 198      ( 14 Feb 2022 07:19 )             23.9     CBC Full  -  ( 14 Feb 2022 07:19 )  WBC Count : 11.27 K/uL  RBC Count : 2.71 M/uL  Hemoglobin : 8.1 g/dL  Hematocrit : 23.9 %  Platelet Count - Automated : 198 K/uL  Mean Cell Volume : 88.2 fl  Mean Cell Hemoglobin : 29.9 pg  Mean Cell Hemoglobin Concentration : 33.9 gm/dL  Auto Neutrophil # : x  Auto Lymphocyte # : x  Auto Monocyte # : x  Auto Eosinophil # : x  Auto Basophil # : x  Auto Neutrophil % : x  Auto Lymphocyte % : x  Auto Monocyte % : x  Auto Eosinophil % : x  Auto Basophil % : x    02-12    126<L>  |  92<L>  |  28<H>  ----------------------------<  78  3.4<L>   |  21<L>  |  4.96<H>    Ca    7.8<L>      12 Feb 2022 10:50  Phos  4.5     02-12  Mg     1.7     02-12                Physical Exam:  General: AAOx3, in NAD  HEENT: NC/AT, EOMI, poor dentition  Cardio: S1S2, tachycardic  Pulm: equal air entry b/l, non labored  Chest: dressing over right chest c/d/i  Abdomen: soft, NT/ND  Extremities: multiple excoriations over all extremities

## 2022-02-14 NOTE — PROGRESS NOTE ADULT - ASSESSMENT
40 yo woman with ESRD on HD, rheumatoid arthritis, Raynauds, tobacco and IV drug use disorder, admitted for eisode of loss of consciousness, sepsis with organ dysfunction, and dialysis catheter tip thrombus, found to have MRSA bacteremia, now found to also have yeast on blood cultures when cultures were repeated due to persisting fever despite removal of permcath and several doses of IV vancomycin.       Sepsis with organ dysfunction (elevated lactate) due to MRSA bacteremia and now likely fungemia  In setting of IV drug use disorder. ID following. On Vancomycin with HD. Started on Caspofungin 2/12, day 2 today. Fever curve improved. Lactic acidosis resolved.   - Continue vanco and caspofungin  - Monitor for fever, maintain MAP > 65  - F/u repeat cultures    Sinus tachycardia  In setting of acute infection.   - Continue to manage sepsis    Dialysis catheter tip thrombus  Catheter since removed due to MRSA bacteremia and sepsis. On Eliquis  - Continue Eliquis    ESRD on HD  Receiving dialysis sessions as per Nephrology, currently via temporary HD catheter as patients R IJ permcath was removed this admission in setting of sepsis, bacteremia. Will need HD access. Vascular Surgery following.   - Continue dialysis sessions, anticipate next session 2/14  - Continue to save R arm (no BP cuffs, IVs or blood draws) and will plan for AVF creation when patient's acute infection resolves    CKD anemia  May also be further compounded by septicemia and fungemia. S/P PRBC transfusion with HD 2/12  - Continue epo    Renal bone disease  -Continue sevelamer    Hx of seizure  - On Keppra    Rheumatoid arthritis  - Continue Plaquenil    Abnormal imaging of the breast  As noted on CT chest 2/12. Asymmetric right breast density with nonspecific soft tissue infiltration extending to right chest wall.   - Patient will need non-emergent breast imaging correlation to exclude underlying malignancy including inflammatory carcinoma.    Retroperitoneal lymphadenopathy with a cluster of multiple enlarged left paraaortic and pelvic lymph nodes  As noted on CT abd/pelvis 2/12 and imaging prior. Rather stable in size.   - Patient will need continued follow up regarding these findings    Severe protein calorie malnutrition  Appreciate input from Nutrition  - Encourage po intake, trend weight      Diet: Renal  DVT px: Eliquis  Code Status: Full     41 year-old woman with ESRD on HD, rheumatoid arthritis, Raynaud's, tobacco and IV drug use disorder, admitted for episode of loss of consciousness, sepsis with organ dysfunction, and dialysis catheter tip thrombus, found to have MRSA bacteremia, since found to also have C.abicans on blood cultures taken when she was noted to have persisting fever despite removal of permcath and several doses of IV vancomycin.       Sepsis with organ dysfunction (elevated lactate) due to MRSA bacteremia and now likely fungemia  In setting of IV drug use disorder. ID following. On Vancomycin with HD. Started on Caspofungin 2/12, day 2 today. Fever curve improved. Lactic acidosis resolved.   - Continue vanco and caspofungin  - Monitor for fever, maintain MAP > 65  - F/u repeat cultures    Sinus tachycardia  In setting of acute infection.   - Continue to manage sepsis    Dialysis catheter tip thrombus  Catheter since removed due to MRSA bacteremia and sepsis. On Eliquis  - Continue Eliquis    ESRD on HD  Receiving dialysis sessions as per Nephrology, currently via temporary HD catheter as patients R IJ permcath was removed this admission in setting of sepsis, bacteremia. Will need HD access. Vascular Surgery following.   - Continue dialysis sessions, anticipate next session 2/14  - Continue to save R arm (no BP cuffs, IVs or blood draws) and will plan for AVF creation when patient's acute infection resolves    CKD anemia  May also be further compounded by septicemia and fungemia. S/P PRBC transfusion with HD 2/12  - Continue epo    Renal bone disease  -Continue sevelamer    Hx of seizure  - On Keppra    Rheumatoid arthritis  - Continue Plaquenil    Abnormal imaging of the breast  As noted on CT chest 2/12. Asymmetric right breast density with nonspecific soft tissue infiltration extending to right chest wall.   - Patient will need non-emergent breast imaging correlation to exclude underlying malignancy including inflammatory carcinoma.    Retroperitoneal lymphadenopathy with a cluster of multiple enlarged left paraaortic and pelvic lymph nodes  As noted on CT abd/pelvis 2/12 and imaging prior. Rather stable in size.   - Patient will need continued follow up regarding these findings    Severe protein calorie malnutrition  Appreciate input from Nutrition  - Encourage po intake, trend weight      Diet: Renal  DVT px: Eliquis  Code Status: Full     41 year-old woman with ESRD on HD, rheumatoid arthritis, Raynaud's, tobacco and IV drug use disorder, admitted for episode of loss of consciousness, sepsis with organ dysfunction, and dialysis catheter tip thrombus, found to have MRSA bacteremia, since found to also have C.abicans on blood cultures taken when she was noted to have persisting fever despite removal of permcath and several doses of IV vancomycin.       Sepsis with organ dysfunction (elevated lactate) due to MRSA bacteremia and now C.albicans in blood culture   In setting of IV drug use disorder. MRSA on blood culture 2/4. Dialysis catheter removed 2/7. R IJ Shiley since placed. Has been on IV vancomycin with dialysis but patient noted to have persisting high fevers, C.albicans on blood culture 2/11. TTE negative for vegetations. ID following. On Vancomycin with HD. Started on Caspofungin 2/12, day 3 today. Fever curve improved. Lactic acidosis resolved. Sacral decubitus wound clean, does not appear to be source of sepsis.    - Continue vancomycin post dialysis, next due tomorrow 500mg x 1  - Continue caspofungin, day 3  - F/u repeat cultures  - Monitor for fever, maintain MAP > 65  - If febrile, Shiley removal tomorrow post HD, otherwise plan to repeat blood cx with HD 2/15  - Opthalmology eval in process of being requested, awaiting call back from specialist    Sinus tachycardia  In setting of acute infection.   - Continue to manage sepsis    Dialysis catheter tip thrombus  Catheter since removed due to MRSA bacteremia and sepsis. On Eliquis.  - Continue Eliquis    ESRD on HD  Receiving dialysis sessions as per Nephrology, currently via temporary HD catheter as patients permcath was removed this admission on 2/7 in setting of sepsis, bacteremia. Will need more permanent HD access once clinically improved and bacteremia / fungemia cleared. Vascular Surgery following.   - Continue dialysis sessions, anticipate next session 2/15  - Continue to save R arm (no BP cuffs, IVs or blood draws) and will plan for AVF creation when patient's acute infection resolves    CKD anemia  May also be further compounded by septicemia and fungemia. S/P PRBC transfusion with HD 2/12  - Continue epo    Renal bone disease  -Continue sevelamer    Loose stools  No hematochezia or melena. No abdominal tenderness. FOBT negative. Cdiff PCR negative. CT with possible mild wall thickening at rectosigmoid junction.   - Will continue to monitor    Hx of seizure  - On Keppra    Rheumatoid arthritis  - Continue Plaquenil    Abnormal imaging of the breast  As noted on CT chest 2/12. Asymmetric right breast density with nonspecific soft tissue infiltration extending to right chest wall.   - Patient will need non-emergent breast imaging correlation to exclude underlying malignancy including inflammatory carcinoma.    Retroperitoneal lymphadenopathy with a cluster of multiple enlarged left paraaortic and pelvic lymph nodes  As noted on CT abd/pelvis 2/12 and imaging prior. Rather stable in size.   - Patient will need continued follow up regarding these findings    Severe protein calorie malnutrition  Appreciate input from Nutrition  - Encourage po intake, trend weight      Diet: Renal  DVT px: Eliquis  Code Status: Full  Dispo: To be determined

## 2022-02-14 NOTE — PROGRESS NOTE ADULT - ASSESSMENT
42 yo female w/PMH of ESRD (M/W/F), GERD, bacteremia, seizures, epilepsy, depression, COPD, HTN, heroin abuse, RA, Cdiff colitis in Dec 2021 smoker, recent admission for Aspiration Pna who  presents to [facility]  [facility] via ambulance from dialysis center for brief loss of consciousness, questionable seizure vs syncope.   EMS reported she had a 15 sec generalized seizure and was AAOx4 afterwards. Pt states she was having shivering and not having a seizure.  She reported that her normal seizures involve her  whole body, unlike day of admit.  She c/o  night sweats last night and chills this morning. Day of admit she had  1.5 hours of hemodialysis instead of her usual HD.   Pt denies cpain/SOB, no cough or resp complaints, no  abd pain.  She reported having a loose stool and vomiting x1 and two episodes day prior to admit. She has urine once a day and denies dysuria. Here noted with MRSA in blood cx. Has perm-catheter in place. Hx of substance abuse.    1. Fungemia. Possible disseminated candidemia. MRSA sepsis/bacteremia. Catheter-related infection. IVC thrombus. hx substance abuse. ESRD  - c albicans growing in blood cx from 2/11   - s/p perm-catheter removal 2/7 and shiley catheter placed  - s/p vancomycin x 4 day #10 of antibiotics, redose post HD tomorrow 500mg  - on caspofungin 50mg daily #3  - repeat blood cx 2/6, 2/8 no growth so far  - TTE no obvious vegetations   - wound care eval for sacral decub - wound clean  - if febrile, shiley removal tomorrow post hd, otherwise repeat blood cx with HD 2/15  - continue with antibiotics/antifungals  - case d/w nephrology   - opthalmology eval   - monitor temps  - tolerating abx well so far; no side effects noted  - reason for abx use and side effects reviewed with patient  - supportive care  - fu cbc    2. other issues - care per medicine  40 yo female w/PMH of ESRD (M/W/F), GERD, bacteremia, seizures, epilepsy, depression, COPD, HTN, heroin abuse, RA, Cdiff colitis in Dec 2021 smoker, recent admission for Aspiration Pna who  presents to [facility]  [facility] via ambulance from dialysis center for brief loss of consciousness, questionable seizure vs syncope.   EMS reported she had a 15 sec generalized seizure and was AAOx4 afterwards. Pt states she was having shivering and not having a seizure.  She reported that her normal seizures involve her  whole body, unlike day of admit.  She c/o  night sweats last night and chills this morning. Day of admit she had  1.5 hours of hemodialysis instead of her usual HD.   Pt denies cpain/SOB, no cough or resp complaints, no  abd pain.  She reported having a loose stool and vomiting x1 and two episodes day prior to admit. She has urine once a day and denies dysuria. Here noted with MRSA in blood cx. Has perm-catheter in place. Hx of substance abuse.    1. Fungemia. Possible disseminated candidemia. MRSA sepsis/bacteremia. Catheter-related infection. IVC thrombus. hx substance abuse. ESRD  - c albicans growing in blood cx from 2/11   - s/p perm-catheter removal 2/7 and shiley catheter placed  - s/p vancomycin x 4 day #10 of antibiotics, redose post HD tomorrow 500mg  - on caspofungin 50mg daily #3  - repeat blood cx 2/6, 2/8, 2/12 no growth so far  - TTE no obvious vegetations   - wound care eval for sacral decub - wound clean  - if febrile, shiley removal tomorrow post hd  - continue with antibiotics/antifungals  - case d/w nephrology   - opthalmology eval   - monitor temps  - tolerating abx well so far; no side effects noted  - reason for abx use and side effects reviewed with patient  - supportive care  - fu cbc    2. other issues - care per medicine

## 2022-02-14 NOTE — PROGRESS NOTE ADULT - SUBJECTIVE AND OBJECTIVE BOX
Chief Complaint: Loss of consciousness    Interval History: No acute events overnight. Patient clinically stable. Reports that she feels generally far better than 48 hrs ago when she was having extremely high fever, rigors, tachycardia, malaise, elevated lactate etc. 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 She is having some occasional loose stools but Cdiff PCR negative and patient otherwise stable. Lactate now WNL. Repeat blood cultures so far no growth to date. For next HD session tomorrow.     - c albicans growing in blood cx from 2/11   - s/p perm-catheter removal 2/7 and shiley catheter placed  - s/p vancomycin x 4 day #10 of antibiotics, redose post HD tomorrow 500mg  - on caspofungin 50mg daily #3  - repeat blood cx 2/6, 2/8 no growth so far  - TTE no obvious vegetations   - wound care eval for sacral decub - wound clean  - if febrile, shiley removal tomorrow post hd, otherwise repeat blood cx with HD 2/15  - continue with antibiotics/antifungals  - case d/w nephrology   - opthalmology eval         ROS: Multi system review is notable for fever, malaise, general debility otherwise negative x 10 systems    Physical Exam:  T(F): 99.5 (14 Feb 2022 09:08), Max: 101 (13 Feb 2022 17:00)  HR: 103 (14 Feb 2022 09:08) (101 - 103)  BP: 137/104 (14 Feb 2022 09:08) (137/104 - 157/90)  RR: 19 (14 Feb 2022 09:08) (18 - 19)  SpO2: 100% (14 Feb 2022 09:08) (98% - 100%) on 2L    GEN: No acute distress  HEENT: NCAT PERRL EOMI  NECK: Supple, symmetric and without tracheal deviation;   CHEST: CTA b/l, no wheezes, rales or rhonchi  CVS: S1 S2 normal, tachycardic to 100  ABD: Soft, non tender, non distended, no rebound, no guarding  EXT: No calf tenderness, no erythema  SKIN: Warm, dry, stage IV sacral ulcer without locoregional tenderness, induration, fluctuance, no purulence  NEURO: Alert, awake, grossly without deficits    Labs:                       8.2    13.04 )------( 230              24.4       126  |  92  |  28  --------------------<  78  3.4   |  21  |  4.96    Ca 7.8  Phos 4.5  Mg 1.7    TPro  6.1  /  Alb  1.8  /  TBili  0.3  /  DBili  x   /  AST  7  /  ALT  8  /  AlkPhos  76      Lactate 6.1 --> 0.9    Micro:  COVID19 PCR 2/13: Negative  Cdiff PCR 2/12: Negative  Blood culture 2/12: In-process  Blood culture 2/11: Pos for yeast, In-process  COVID19 PCR 2/10: Neg  Blood culture 2/8: Negative  Blood culture 2/6: Negative  Blood culture 2/4: MRSA    Imaging:  CT chest 2/12: Near complete resolution of previously identified mucoid debris in bronchus intermedius and bilateral lower lobe bronchi with tree-in-bud nodularity, right greater than left. No consolidation or pleural effusion. Asymmetric right breast density with nonspecific soft tissue infiltration extending to right chest wall.    CT abd/pelvis 2/12: Rectal tube identified, tip at the level of the rectosigmoid junction containing contrast. Mild wall thickening of the rectosigmoid junction. No bowel obstruction. Reidentified bulky retroperitoneal lymphadenopathy with cluster of multiple enlarged left paraaortic and pelvic lymph nodes, stable compared to recent prior study.    Cardiac Testing:  TTE 2/8 : The left ventricle is normal in size. Moderately reduced left ventricular systolic function. Estimated left ventricular ejection fraction is 40%. Normal appearing right atrium. A catheter wire is seen in the right atrium. Normal aortic valve structure and function. Trivial aortic regurgitation is present. Normal appearing mitral valve structure and function. Mild (1+) mitral regurgitation is present.    Meds:  MEDICATIONS  (STANDING):  artificial tears (preservative free) Ophthalmic Solution 1 Drop(s) Both EYES two times a day  caspofungin IVPB      chlorhexidine 4% Liquid 1 Application(s) Topical <User Schedule>  collagenase Ointment 1 Application(s) Topical daily  epoetin amee-epbx (RETACRIT) Injectable 20652 Unit(s) IV Push <User Schedule>  gabapentin 100 milliGRAM(s) Oral three times a day  hydroxychloroquine 200 milliGRAM(s) Oral daily  levETIRAcetam 250 milliGRAM(s) Oral <User Schedule>  levETIRAcetam 500 milliGRAM(s) Oral daily  metoprolol tartrate 12.5 milliGRAM(s) Oral two times a day  pantoprazole    Tablet 40 milliGRAM(s) Oral before breakfast  sevelamer carbonate 800 milliGRAM(s) Oral three times a day with meals  vancomycin    Solution 125 milliGRAM(s) Oral every 6 hours  vancomycin  IVPB 500 milliGRAM(s) IV Intermittent once    MEDICATIONS  (PRN):  acetaminophen     Tablet .. 650 milliGRAM(s) Oral every 6 hours PRN Temp greater or equal to 38.5C (101.3F), Mild Pain (1 - 3)  acetaminophen   IVPB .. 1000 milliGRAM(s) IV Intermittent once PRN Temp greater or equal to 38.5C (101.3F)  albumin human 25% IVPB 50 milliLiter(s) IV Intermittent every 1 hour PRN SBP less than 100  ALBUTerol    90 MICROgram(s) HFA Inhaler 2 Puff(s) Inhalation every 6 hours PRN Shortness of Breath and/or Wheezing  diphenhydrAMINE 25 milliGRAM(s) Oral every 6 hours PRN Rash and/or Itching  loperamide 2 milliGRAM(s) Oral every 6 hours PRN Diarrhea  melatonin 3 milliGRAM(s) Oral at bedtime PRN Insomnia  oxycodone    5 mG/acetaminophen 325 mG 1 Tablet(s) Oral every 8 hours PRN Severe Pain (7 - 10)  senna 2 Tablet(s) Oral at bedtime PRN Constipation  sodium chloride 0.9% lock flush 10 milliLiter(s) IV Push every 1 hour PRN Pre/post blood products, medications, blood draw, and to maintain line patency Chief Complaint: Loss of consciousness    Interval History: No acute events overnight. Patient clinically stable. Reports that she feels generally far better than 48 hrs ago when she was having extremely high fever, rigors, tachycardia, malaise, elevated lactate etc. She has since been found to have C.albicans growing in blood culture from 2/12 for which she was resuscitated and placed on IV caspofungin, now day 3 today. She is having some occasional loose stools but Cdiff PCR negative and patient otherwise stable. Lactate now WNL. Repeat blood cultures so far no growth to date. For next HD session tomorrow.     - c albicans growing in blood cx from 2/11   - s/p perm-catheter removal 2/7 and shiley catheter placed  - s/p vancomycin x 4 day #10 of antibiotics, redose post HD tomorrow 500mg  - on caspofungin 50mg daily #3  - repeat blood cx 2/6, 2/8 no growth so far  - TTE no obvious vegetations   - wound care eval for sacral decub - wound clean  - if febrile, shiley removal tomorrow post hd, otherwise repeat blood cx with HD 2/15  - continue with antibiotics/antifungals  - case d/w nephrology   - opthalmology eval         ROS: Multi system review is notable for fever, malaise, general debility otherwise negative x 10 systems    Physical Exam:  T(F): 99.5 (14 Feb 2022 09:08), Max: 101 (13 Feb 2022 17:00)  HR: 103 (14 Feb 2022 09:08) (101 - 103)  BP: 137/104 (14 Feb 2022 09:08) (137/104 - 157/90)  RR: 19 (14 Feb 2022 09:08) (18 - 19)  SpO2: 100% (14 Feb 2022 09:08) (98% - 100%) on 2L    GEN: No acute distress  HEENT: NCAT PERRL EOMI  NECK: R IJ Shiley catheter in place, neck supple  CHEST: CTA b/l, no wheezes, rales or rhonchi  CVS: S1 S2 normal, tachycardic to 100  ABD: Soft, non tender, non distended, no rebound, no guarding  EXT: No calf tenderness, no erythema  SKIN: Warm, dry, stage IV sacral ulcer without locoregional tenderness, induration, fluctuance, no purulence  NEURO: Alert, awake, grossly without deficits    Labs:                       8.2    13.04 )------( 230              24.4       126  |  92  |  28  --------------------<  78  3.4   |  21  |  4.96    Ca 7.8  Phos 4.5  Mg 1.7    TPro  6.1  /  Alb  1.8  /  TBili  0.3  /  DBili  x   /  AST  7  /  ALT  8  /  AlkPhos  76      Lactate 6.1 --> 0.9    Micro:  COVID19 PCR 2/13: Negative  Cdiff PCR 2/12: Negative  Blood culture 2/12: No growth to date  Blood culture 2/11: C.albicans  COVID19 PCR 2/10: Neg  Blood culture 2/8: Negative  Blood culture 2/6: Negative  Blood culture 2/4: MRSA    Imaging:  CT chest 2/12: Near complete resolution of previously identified mucoid debris in bronchus intermedius and bilateral lower lobe bronchi with tree-in-bud nodularity, right greater than left. No consolidation or pleural effusion. Asymmetric right breast density with nonspecific soft tissue infiltration extending to right chest wall.    CT abd/pelvis 2/12: Rectal tube identified, tip at the level of the rectosigmoid junction containing contrast. Mild wall thickening of the rectosigmoid junction. No bowel obstruction. Reidentified bulky retroperitoneal lymphadenopathy with cluster of multiple enlarged left paraaortic and pelvic lymph nodes, stable compared to recent prior study.    Cardiac Testing:  TTE 2/8 : The left ventricle is normal in size. Moderately reduced left ventricular systolic function. Estimated left ventricular ejection fraction is 40%. Normal appearing right atrium. A catheter wire is seen in the right atrium. Normal aortic valve structure and function. Trivial aortic regurgitation is present. Normal appearing mitral valve structure and function. Mild (1+) mitral regurgitation is present.    Meds:  MEDICATIONS  (STANDING):  apixaban 2.5 milliGRAM(s) Oral two times a day  artificial tears (preservative free) Ophthalmic Solution 1 Drop(s) Both EYES two times a day  caspofungin IVPB 50 milliGRAM(s) IV Intermittent every 24 hours  chlorhexidine 4% Liquid 1 Application(s) Topical <User Schedule>  collagenase Ointment 1 Application(s) Topical daily  epoetin aeme-epbx (RETACRIT) Injectable 64512 Unit(s) IV Push <User Schedule>  gabapentin 100 milliGRAM(s) Oral three times a day  hydroxychloroquine 200 milliGRAM(s) Oral daily  levETIRAcetam 250 milliGRAM(s) Oral <User Schedule>  levETIRAcetam 500 milliGRAM(s) Oral daily  metoprolol tartrate 12.5 milliGRAM(s) Oral two times a day  pantoprazole    Tablet 40 milliGRAM(s) Oral before breakfast  sevelamer carbonate 800 milliGRAM(s) Oral three times a day with meals  vancomycin  IVPB 500 milliGRAM(s) IV Intermittent once    MEDICATIONS  (PRN):  acetaminophen     Tablet .. 650 milliGRAM(s) Oral every 6 hours PRN Temp greater or equal to 38.5C (101.3F), Mild Pain (1 - 3)  acetaminophen   IVPB .. 1000 milliGRAM(s) IV Intermittent once PRN Temp greater or equal to 38.5C (101.3F)  albumin human 25% IVPB 50 milliLiter(s) IV Intermittent every 1 hour PRN SBP less than 100  ALBUTerol    90 MICROgram(s) HFA Inhaler 2 Puff(s) Inhalation every 6 hours PRN Shortness of Breath and/or Wheezing  diphenhydrAMINE 25 milliGRAM(s) Oral every 6 hours PRN Rash and/or Itching  loperamide 2 milliGRAM(s) Oral every 6 hours PRN Diarrhea  melatonin 3 milliGRAM(s) Oral at bedtime PRN Insomnia  oxycodone    5 mG/acetaminophen 325 mG 1 Tablet(s) Oral every 8 hours PRN Severe Pain (7 - 10)  senna 2 Tablet(s) Oral at bedtime PRN Constipation  sodium chloride 0.9% lock flush 10 milliLiter(s) IV Push every 1 hour PRN Pre/post blood products, medications, blood draw, and to maintain line patency   Chief Complaint: Loss of consciousness    Interval History: No acute events overnight. Patient clinically stable. Reports that she feels generally far better than 48 hrs ago when she was having extremely high fever, rigors, tachycardia, malaise, elevated lactate etc. She has since been found to have C.albicans growing in blood culture from 2/12 for which she was resuscitated and placed on IV caspofungin, now day 3 today. She is having some occasional loose stools but Cdiff PCR negative and patient otherwise stable. Lactate now WNL. Repeat blood cultures so far no growth to date. TTE without vegetations. For next HD session tomorrow.     ROS: Multi system review is notable for fever, malaise, general debility otherwise negative x 10 systems    Physical Exam:  T(F): 99.5 (14 Feb 2022 09:08), Max: 101 (13 Feb 2022 17:00)  HR: 103 (14 Feb 2022 09:08) (101 - 103)  BP: 137/104 (14 Feb 2022 09:08) (137/104 - 157/90)  RR: 19 (14 Feb 2022 09:08) (18 - 19)  SpO2: 100% (14 Feb 2022 09:08) (98% - 100%) on 2L    GEN: No acute distress  HEENT: NCAT PERRL EOMI  NECK: R IJ Shiley catheter in place, neck supple  CHEST: CTA b/l, no wheezes, rales or rhonchi  CVS: S1 S2 normal, tachycardic to 100  ABD: Soft, non tender, non distended, no rebound, no guarding  EXT: No calf tenderness, no erythema  SKIN: Warm, dry, stage IV sacral ulcer without locoregional tenderness, induration, fluctuance, no purulence  NEURO: Alert, awake, grossly without deficits    Labs:                                8.1    11.27 )------( 198             23.9     126  |  92  |  28  --------------------<  78  3.4   |  21  |  4.96    Ca 7.8  Phos 4.5  Mg 1.7    TPro  6.1  /  Alb  1.8  /  TBili  0.3  /  DBili  x   /  AST  7  /  ALT  8  /  AlkPhos  76      Lactate 6.1 --> 0.9    Micro:  COVID19 PCR 2/13: Negative  Cdiff PCR 2/12: Negative  Blood culture 2/12: No growth to date  Blood culture 2/11: C.albicans  COVID19 PCR 2/10: Neg  Blood culture 2/8: Negative  Blood culture 2/6: Negative  Blood culture 2/4: MRSA    Imaging:  CT chest 2/12: Near complete resolution of previously identified mucoid debris in bronchus intermedius and bilateral lower lobe bronchi with tree-in-bud nodularity, right greater than left. No consolidation or pleural effusion. Asymmetric right breast density with nonspecific soft tissue infiltration extending to right chest wall.    CT abd/pelvis 2/12: Rectal tube identified, tip at the level of the rectosigmoid junction containing contrast. Mild wall thickening of the rectosigmoid junction. No bowel obstruction. Reidentified bulky retroperitoneal lymphadenopathy with cluster of multiple enlarged left paraaortic and pelvic lymph nodes, stable compared to recent prior study.    Cardiac Testing:  EKG 2/12: Sinus tachycardia,     TTE 2/8 : The left ventricle is normal in size. Moderately reduced left ventricular systolic function. Estimated left ventricular ejection fraction is 40%. Normal appearing right atrium. A catheter wire is seen in the right atrium. Normal aortic valve structure and function. Trivial aortic regurgitation is present. Normal appearing mitral valve structure and function. Mild (1+) mitral regurgitation is present.    Meds:  MEDICATIONS  (STANDING):  apixaban 2.5 milliGRAM(s) Oral two times a day  artificial tears (preservative free) Ophthalmic Solution 1 Drop(s) Both EYES two times a day  caspofungin IVPB 50 milliGRAM(s) IV Intermittent every 24 hours  chlorhexidine 4% Liquid 1 Application(s) Topical <User Schedule>  collagenase Ointment 1 Application(s) Topical daily  epoetin amee-epbx (RETACRIT) Injectable 58328 Unit(s) IV Push <User Schedule>  gabapentin 100 milliGRAM(s) Oral three times a day  hydroxychloroquine 200 milliGRAM(s) Oral daily  levETIRAcetam 250 milliGRAM(s) Oral <User Schedule>  levETIRAcetam 500 milliGRAM(s) Oral daily  metoprolol tartrate 12.5 milliGRAM(s) Oral two times a day  pantoprazole    Tablet 40 milliGRAM(s) Oral before breakfast  sevelamer carbonate 800 milliGRAM(s) Oral three times a day with meals  vancomycin  IVPB 500 milliGRAM(s) IV Intermittent once    MEDICATIONS  (PRN):  acetaminophen     Tablet .. 650 milliGRAM(s) Oral every 6 hours PRN Temp greater or equal to 38.5C (101.3F), Mild Pain (1 - 3)  acetaminophen   IVPB .. 1000 milliGRAM(s) IV Intermittent once PRN Temp greater or equal to 38.5C (101.3F)  albumin human 25% IVPB 50 milliLiter(s) IV Intermittent every 1 hour PRN SBP less than 100  ALBUTerol    90 MICROgram(s) HFA Inhaler 2 Puff(s) Inhalation every 6 hours PRN Shortness of Breath and/or Wheezing  diphenhydrAMINE 25 milliGRAM(s) Oral every 6 hours PRN Rash and/or Itching  loperamide 2 milliGRAM(s) Oral every 6 hours PRN Diarrhea  melatonin 3 milliGRAM(s) Oral at bedtime PRN Insomnia  oxycodone    5 mG/acetaminophen 325 mG 1 Tablet(s) Oral every 8 hours PRN Severe Pain (7 - 10)  senna 2 Tablet(s) Oral at bedtime PRN Constipation  sodium chloride 0.9% lock flush 10 milliLiter(s) IV Push every 1 hour PRN Pre/post blood products, medications, blood draw, and to maintain line patency   Chief Complaint: Loss of consciousness    Interval History: No acute events overnight. Patient clinically stable. Reports that she feels generally far better than 48 hrs ago when she was having extremely high fever, rigors, tachycardia, malaise, elevated lactate etc. She has since been found to have C.albicans growing in blood culture from 2/12 for which she was resuscitated and placed on IV caspofungin, now day 3 today. She is having some occasional loose stools but Cdiff PCR negative and patient otherwise stable. Lactate now WNL. Repeat blood cultures so far no growth to date. TTE without vegetations. For next HD session tomorrow.     ROS: Multi system review is notable for low grade fever, severe debility, sacral decubitus wound, loose stool. Otherwise negative x 10 systems    Physical Exam:  T(F): 99.5 (14 Feb 2022 09:08), Max: 101 (13 Feb 2022 17:00)  HR: 103 (14 Feb 2022 09:08) (101 - 103)  BP: 137/104 (14 Feb 2022 09:08) (137/104 - 157/90)  RR: 19 (14 Feb 2022 09:08) (18 - 19)  SpO2: 100% (14 Feb 2022 09:08) (98% - 100%) on 2L    GEN: No acute distress  HEENT: NCAT PERRL EOMI  NECK: R IJ Shiley catheter in place, neck supple  CHEST: CTA b/l, no wheezes, rales or rhonchi  CVS: S1 S2 normal, tachycardic to 100  ABD: Soft, non tender, non distended, no rebound, no guarding  EXT: No calf tenderness, no erythema  SKIN: Warm, dry, stage IV sacral ulcer without locoregional tenderness, induration, fluctuance, no purulence  NEURO: Alert, awake, grossly without deficits    Labs:                                8.1    11.27 )------( 198             23.9     126  |  92  |  28  --------------------<  78  3.4   |  21  |  4.96    Ca 7.8  Phos 4.5  Mg 1.7    TPro  6.1  /  Alb  1.8  /  TBili  0.3  /  DBili  x   /  AST  7  /  ALT  8  /  AlkPhos  76      Lactate 6.1 --> 0.9    Micro:  COVID19 PCR 2/13: Negative  Cdiff PCR 2/12: Negative  Blood culture 2/12: No growth to date  Blood culture 2/11: C.albicans  COVID19 PCR 2/10: Neg  Blood culture 2/8: Negative  Blood culture 2/6: Negative  Blood culture 2/4: MRSA    Imaging:  CT chest 2/12: Near complete resolution of previously identified mucoid debris in bronchus intermedius and bilateral lower lobe bronchi with tree-in-bud nodularity, right greater than left. No consolidation or pleural effusion. Asymmetric right breast density with nonspecific soft tissue infiltration extending to right chest wall.    CT abd/pelvis 2/12: Rectal tube identified, tip at the level of the rectosigmoid junction containing contrast. Mild wall thickening of the rectosigmoid junction. No bowel obstruction. Reidentified bulky retroperitoneal lymphadenopathy with cluster of multiple enlarged left paraaortic and pelvic lymph nodes, stable compared to recent prior study.    Cardiac Testing:  EKG 2/12: Sinus tachycardia,     TTE 2/8 : The left ventricle is normal in size. Moderately reduced left ventricular systolic function. Estimated left ventricular ejection fraction is 40%. Normal appearing right atrium. A catheter wire is seen in the right atrium. Normal aortic valve structure and function. Trivial aortic regurgitation is present. Normal appearing mitral valve structure and function. Mild (1+) mitral regurgitation is present.    Meds:  MEDICATIONS  (STANDING):  apixaban 2.5 milliGRAM(s) Oral two times a day  artificial tears (preservative free) Ophthalmic Solution 1 Drop(s) Both EYES two times a day  caspofungin IVPB 50 milliGRAM(s) IV Intermittent every 24 hours  chlorhexidine 4% Liquid 1 Application(s) Topical <User Schedule>  collagenase Ointment 1 Application(s) Topical daily  epoetin amee-epbx (RETACRIT) Injectable 05895 Unit(s) IV Push <User Schedule>  gabapentin 100 milliGRAM(s) Oral three times a day  hydroxychloroquine 200 milliGRAM(s) Oral daily  levETIRAcetam 250 milliGRAM(s) Oral <User Schedule>  levETIRAcetam 500 milliGRAM(s) Oral daily  metoprolol tartrate 12.5 milliGRAM(s) Oral two times a day  pantoprazole    Tablet 40 milliGRAM(s) Oral before breakfast  sevelamer carbonate 800 milliGRAM(s) Oral three times a day with meals  vancomycin  IVPB 500 milliGRAM(s) IV Intermittent once    MEDICATIONS  (PRN):  acetaminophen     Tablet .. 650 milliGRAM(s) Oral every 6 hours PRN Temp greater or equal to 38.5C (101.3F), Mild Pain (1 - 3)  acetaminophen   IVPB .. 1000 milliGRAM(s) IV Intermittent once PRN Temp greater or equal to 38.5C (101.3F)  albumin human 25% IVPB 50 milliLiter(s) IV Intermittent every 1 hour PRN SBP less than 100  ALBUTerol    90 MICROgram(s) HFA Inhaler 2 Puff(s) Inhalation every 6 hours PRN Shortness of Breath and/or Wheezing  diphenhydrAMINE 25 milliGRAM(s) Oral every 6 hours PRN Rash and/or Itching  loperamide 2 milliGRAM(s) Oral every 6 hours PRN Diarrhea  melatonin 3 milliGRAM(s) Oral at bedtime PRN Insomnia  oxycodone    5 mG/acetaminophen 325 mG 1 Tablet(s) Oral every 8 hours PRN Severe Pain (7 - 10)  senna 2 Tablet(s) Oral at bedtime PRN Constipation  sodium chloride 0.9% lock flush 10 milliLiter(s) IV Push every 1 hour PRN Pre/post blood products, medications, blood draw, and to maintain line patency

## 2022-02-14 NOTE — PROGRESS NOTE ADULT - REASON FOR ADMISSION
Episode of loss of consciousness, sepsis with organ dysfunction (elevated lactate), dialysis catheter tip thrombus  Sacral Decubitus  IVC Tip Thrombus  Abn CT Ab Episode of loss of consciousness, sepsis with organ dysfunction (elevated lactate), dialysis catheter tip thrombus  IVC Tip Thrombus  Abn CT Ab

## 2022-02-14 NOTE — PROGRESS NOTE ADULT - ASSESSMENT
42 yo F with ESRD on HD, presents with bacteremia s/p RIJ PC removal, in need of HD access.    Plan:  -blood cultures from 2/11 show MRSA bacteremia and fungemia  -ID on board, recs appreciated  -c/w IV abx  -nephro on board, recs appreciated  -will need temporary HD access but as patient is bacteremic and fungemic, patient cannot undergo surgery for fistula creation at this time  -continue to save right arm - no BP cuffs, IVs or blood draws  -will plan for AVF creation when patient acute infection resolves  -medical management as per primary team    Plan discussed with Dr. Lynch

## 2022-02-14 NOTE — PROGRESS NOTE ADULT - ASSESSMENT
42 yo female w/PMH of ESRD (M/W/F), GERD, bacteremia, seizures, epilepsy, depression, COPD, HTN, heroin abuse, RA, Cdiff colitis in Dec 2021 smoker, recent admission for Aspiration Pna who  presents to [place]  ED via ambulance from dialysis center for brief loss of consciousness, questionable seizure vs syncope.        ESRD on HD TTS   MRSA bacteremia w persistent fevers - now Candidemia on 2/11 ( same day catheter was inserted ) - no immmeidate need to remove catheter today    on caspofungin as per ID    but low grade fevers noted - 100- 101    if remains febrile overnight and in AM - then plan on removal of catheter after HD Tuesday 2/15 to give another line holiday   if fever defervesces then continue to monitor w line in place till permcath can be placed   monitor overnight temps   d.w Dr Ugalde regarding above plan   Next HD tomorrow    check HIV status*  - pt informed and agreeable to test     Will need perm access - Vascular following for access eventual Right AVF or AVG when stable          HTN  - oral meds    Anemia - no overt bleeding   chronic /infection related ?   TAMARA at HD   PRBC as needed    d/w Dr Tram cote today    * pt seen earilier today        42 yo female w/PMH of ESRD (M/W/F), GERD, bacteremia, seizures, epilepsy, depression, COPD, HTN, heroin abuse, RA, Cdiff colitis in Dec 2021 smoker, recent admission for Aspiration Pna who  presents to [place]  ED via ambulance from dialysis center for brief loss of consciousness, questionable seizure vs syncope.        ESRD on HD TTS   MRSA bacteremia w persistent fevers - now Candidemia on 2/11 ( same day catheter was inserted ) - no immmeidate need to remove catheter today    on caspofungin as per ID    but low grade fevers noted - 100- 101    if remains febrile overnight and in AM - then plan on removal of catheter after HD Tuesday 2/15 to give another line holiday   if fever defervesces then continue to monitor w line in place till permcath can be placed   monitor overnight temps   dDorothyw Dr Ugalde regarding above plan   Next HD tomorrow    check HIV status*  - pt informed and agreeable to test     Will need perm access - Vascular following for access eventual Right AVF or AVG when stable          HTN  - oral meds    Anemia - no overt bleeding   chronic /infection related ?   TAMARA at HD   PRBC as needed       * pt seen earilier today

## 2022-02-14 NOTE — PROGRESS NOTE ADULT - SUBJECTIVE AND OBJECTIVE BOX
pt alert and awake    denies complaints   Temp 101 on 2/13  5 pm    temp 100's overnight    cx + MRSA and Candida albicans on 2/11   able to complete last HD on 2/12      HPI:   40 yo female w/PMH of ESRD (M/W/F), GERD, bacteremia, seizures, epilepsy, depression, COPD, HTN, heroin abuse, RA, Cdiff colitis in Dec 2021 smoker, recent admission for Aspiration Pna who  presents to [place]  ED via ambulance from dialysis center for brief loss of consciousness, questionable seizure vs syncope.        MEDICATIONS  (STANDING):  apixaban 2.5 milliGRAM(s) Oral two times a day  carboxymethylcellulose 0.5% Ophthalmic Solution 1 Drop(s) Both EYES two times a day  caspofungin IVPB 50 milliGRAM(s) IV Intermittent every 24 hours  caspofungin IVPB      chlorhexidine 4% Liquid 1 Application(s) Topical <User Schedule>  collagenase Ointment 1 Application(s) Topical daily  epoetin amee-epbx (RETACRIT) Injectable 72935 Unit(s) IV Push <User Schedule>  gabapentin 100 milliGRAM(s) Oral three times a day  hydroxychloroquine 200 milliGRAM(s) Oral daily  levETIRAcetam 250 milliGRAM(s) Oral <User Schedule>  levETIRAcetam 500 milliGRAM(s) Oral daily  metoprolol tartrate 12.5 milliGRAM(s) Oral two times a day  pantoprazole    Tablet 40 milliGRAM(s) Oral before breakfast  sevelamer carbonate 800 milliGRAM(s) Oral three times a day with meals  vancomycin  IVPB 500 milliGRAM(s) IV Intermittent once      Vital Signs Last 24 Hrs  T(C): 36.9 (14 Feb 2022 20:08), Max: 38 (14 Feb 2022 19:00)  T(F): 98.5 (14 Feb 2022 20:08), Max: 100.4 (14 Feb 2022 19:00)  HR: 102 (14 Feb 2022 21:12) (97 - 103)  BP: 154/99 (14 Feb 2022 21:12) (137/104 - 154/99)  BP(mean): --  RR: 18 (14 Feb 2022 20:08) (18 - 19)  SpO2: 97% (14 Feb 2022 20:08) (97% - 100%)      I&O's Detail      PHYSICAL EXAM:  GENERAL: no distress  CHEST/LUNG: scattered rhonchi  HEART: S1S2 RRR  ABDOMEN: soft  EXTREMITIES: trace edema  SKIN:     LABS:                                   8.1    11.27 )-----------( 198      ( 14 Feb 2022 07:19 )             23.9         126    |  92     |  28     ----------------------------<  78        12 Feb 2022 10:50  3.4     |  21     |  4.96     129    |  97     |  32     ----------------------------<  87        11 Feb 2022 08:08  3.4     |  19     |  6.13     Ca    7.8        12 Feb 2022 10:50  Ca    8.0        11 Feb 2022 08:08    Phos  4.5       12 Feb 2022 10:50    Mg     1.7       12 Feb 2022 10:50    TPro  6.1    /  Alb  1.8    /  TBili  0.3    /        11 Feb 2022 08:08  DBili  x      /  AST  7      /  ALT  8      /  AlkPhos  76                   RADIOLOGY & ADDITIONAL TESTS:

## 2022-02-15 LAB
-  FLUCONAZOLE: SIGNIFICANT CHANGE UP
-  INTERPRETATION: SIGNIFICANT CHANGE UP
ALBUMIN SERPL ELPH-MCNC: 1.6 G/DL — LOW (ref 3.3–5)
ANION GAP SERPL CALC-SCNC: 11 MMOL/L — SIGNIFICANT CHANGE UP (ref 5–17)
BUN SERPL-MCNC: 28 MG/DL — HIGH (ref 7–23)
CALCIUM SERPL-MCNC: 7.8 MG/DL — LOW (ref 8.5–10.1)
CHLORIDE SERPL-SCNC: 97 MMOL/L — SIGNIFICANT CHANGE UP (ref 96–108)
CO2 SERPL-SCNC: 23 MMOL/L — SIGNIFICANT CHANGE UP (ref 22–31)
CREAT SERPL-MCNC: 5.21 MG/DL — HIGH (ref 0.5–1.3)
GLUCOSE SERPL-MCNC: 91 MG/DL — SIGNIFICANT CHANGE UP (ref 70–99)
HCT VFR BLD CALC: 28.1 % — LOW (ref 34.5–45)
HGB BLD-MCNC: 9.4 G/DL — LOW (ref 11.5–15.5)
MCHC RBC-ENTMCNC: 29.7 PG — SIGNIFICANT CHANGE UP (ref 27–34)
MCHC RBC-ENTMCNC: 33.5 GM/DL — SIGNIFICANT CHANGE UP (ref 32–36)
MCV RBC AUTO: 88.6 FL — SIGNIFICANT CHANGE UP (ref 80–100)
METHOD TYPE: SIGNIFICANT CHANGE UP
PHOSPHATE SERPL-MCNC: 4 MG/DL — SIGNIFICANT CHANGE UP (ref 2.5–4.5)
PLATELET # BLD AUTO: 253 K/UL — SIGNIFICANT CHANGE UP (ref 150–400)
POTASSIUM SERPL-MCNC: 3 MMOL/L — LOW (ref 3.5–5.3)
POTASSIUM SERPL-SCNC: 3 MMOL/L — LOW (ref 3.5–5.3)
RBC # BLD: 3.17 M/UL — LOW (ref 3.8–5.2)
RBC # FLD: 15.8 % — HIGH (ref 10.3–14.5)
SODIUM SERPL-SCNC: 131 MMOL/L — LOW (ref 135–145)
WBC # BLD: 10.86 K/UL — HIGH (ref 3.8–10.5)
WBC # FLD AUTO: 10.86 K/UL — HIGH (ref 3.8–10.5)

## 2022-02-15 PROCEDURE — 99232 SBSQ HOSP IP/OBS MODERATE 35: CPT

## 2022-02-15 RX ORDER — VANCOMYCIN HCL 1 G
500 VIAL (EA) INTRAVENOUS
Refills: 0 | Status: DISCONTINUED | OUTPATIENT
Start: 2022-02-15 | End: 2022-02-18

## 2022-02-15 RX ADMIN — Medication 1 DROP(S): at 14:48

## 2022-02-15 RX ADMIN — LEVETIRACETAM 500 MILLIGRAM(S): 250 TABLET, FILM COATED ORAL at 14:42

## 2022-02-15 RX ADMIN — Medication 200 MILLIGRAM(S): at 14:41

## 2022-02-15 RX ADMIN — OXYCODONE AND ACETAMINOPHEN 1 TABLET(S): 5; 325 TABLET ORAL at 16:43

## 2022-02-15 RX ADMIN — GABAPENTIN 100 MILLIGRAM(S): 400 CAPSULE ORAL at 14:47

## 2022-02-15 RX ADMIN — SEVELAMER CARBONATE 800 MILLIGRAM(S): 2400 POWDER, FOR SUSPENSION ORAL at 16:43

## 2022-02-15 RX ADMIN — CHLORHEXIDINE GLUCONATE 1 APPLICATION(S): 213 SOLUTION TOPICAL at 05:51

## 2022-02-15 RX ADMIN — Medication 1 DROP(S): at 22:41

## 2022-02-15 RX ADMIN — PANTOPRAZOLE SODIUM 40 MILLIGRAM(S): 20 TABLET, DELAYED RELEASE ORAL at 05:52

## 2022-02-15 RX ADMIN — OXYCODONE AND ACETAMINOPHEN 1 TABLET(S): 5; 325 TABLET ORAL at 08:21

## 2022-02-15 RX ADMIN — CASPOFUNGIN ACETATE 260 MILLIGRAM(S): 7 INJECTION, POWDER, LYOPHILIZED, FOR SOLUTION INTRAVENOUS at 14:48

## 2022-02-15 RX ADMIN — GABAPENTIN 100 MILLIGRAM(S): 400 CAPSULE ORAL at 22:37

## 2022-02-15 RX ADMIN — APIXABAN 2.5 MILLIGRAM(S): 2.5 TABLET, FILM COATED ORAL at 22:37

## 2022-02-15 RX ADMIN — ERYTHROPOIETIN 10000 UNIT(S): 10000 INJECTION, SOLUTION INTRAVENOUS; SUBCUTANEOUS at 13:11

## 2022-02-15 RX ADMIN — Medication 3 MILLIGRAM(S): at 22:23

## 2022-02-15 RX ADMIN — Medication 12.5 MILLIGRAM(S): at 14:42

## 2022-02-15 RX ADMIN — Medication 100 MILLIGRAM(S): at 18:37

## 2022-02-15 RX ADMIN — Medication 1 APPLICATION(S): at 14:49

## 2022-02-15 RX ADMIN — APIXABAN 2.5 MILLIGRAM(S): 2.5 TABLET, FILM COATED ORAL at 14:42

## 2022-02-15 RX ADMIN — ALBUTEROL 2 PUFF(S): 90 AEROSOL, METERED ORAL at 22:05

## 2022-02-15 RX ADMIN — SEVELAMER CARBONATE 800 MILLIGRAM(S): 2400 POWDER, FOR SUSPENSION ORAL at 08:20

## 2022-02-15 RX ADMIN — Medication 12.5 MILLIGRAM(S): at 22:36

## 2022-02-15 NOTE — PROGRESS NOTE ADULT - ASSESSMENT
40 yo female w/PMH of ESRD (M/W/F), GERD, bacteremia, seizures, epilepsy, depression, COPD, HTN, heroin abuse, RA, Cdiff colitis in Dec 2021 smoker, recent admission for Aspiration Pna who  presents to [place]  ED via ambulance from dialysis center for brief loss of consciousness, questionable seizure vs syncope.   EMS reported she had a 15 sec generalized seizure and was AAOx4 afterwards. Pt states she was having shivering and not having a seizure.  She reported that her normal seizures involve her  whole body, unlike day of admit.  She c/o  night sweats last night and chills this morning. Day of admit she had  1.5 hours of hemodialysis instead of her usual HD.   Pt denies cpain/SOB, no cough or resp complaints, no  abd pain.  She reported having a loose stool and vomiting x1 and two episodes day prior to admit. She has urine once a day and denies dysuria. Here noted with MRSA in blood cx. Has perm-catheter in place. Hx of substance abuse.    1. Fungemia. Possible disseminated candidemia. MRSA sepsis/bacteremia. Catheter-related infection. IVC thrombus. hx substance abuse. ESRD  - c albicans growing in blood cx from 2/11   - s/p perm-catheter removal 2/7 and shiley catheter placed  - on vancomycin 500mg post HD #11 of antibiotics  - on caspofungin 50mg daily #4  - repeat blood cx 2/6, 2/8, 2/12 no growth so far  - TTE no obvious vegetations   - wound care eval for sacral decub - wound clean  - continue with antibiotics/antifungals  - case d/w nephrology, 2/12 blood cx no growth x 5 days, can proceed with tessio line placement   - opthalmology eval   - monitor temps  - tolerating abx well so far; no side effects noted  - reason for abx use and side effects reviewed with patient  - supportive care  - fu cbc    2. other issues - care per medicine

## 2022-02-15 NOTE — PROGRESS NOTE ADULT - ASSESSMENT
40 yo F with ESRD on HD, presents with bacteremia s/p RIJ PC removal, in need of HD access.    Plan:  -blood cultures from 2/11 show MRSA bacteremia and fungemia  -ID on board, recs appreciated  -c/w IV abx  -nephro on board, recs appreciated  -will need temporary HD access but as patient is bacteremic and fungemic, patient cannot undergo surgery for fistula creation at this time  -continue to save right arm - no BP cuffs, IVs or blood draws  -will plan for AVF creation when patient acute infection resolves  -medical management as per primary team    Plan discussed with Dr. Lynch, vascular surgery attending

## 2022-02-15 NOTE — PHYSICAL THERAPY INITIAL EVALUATION ADULT - PRECAUTIONS/LIMITATIONS, REHAB EVAL
CT CHEST/AP: Near complete resolution of previously identified mucoid debris in bronchus intermedius and bilateral lower lobe bronchi with tree-in-bud nodularity, right greater than left. No consolidation or pleural effusion. Asymmetric right breast density with nonspecific soft tissue infiltration extending to right chest wall. Recommend nonemergent breast imaging correlation to exclude underlying malignancy including inflammatory carcinoma. Rectal tube identified, tip at the level of the rectosigmoid junction containing contrast. Mild wall thickening of the rectosigmoid junction. Recommend clinical correlation to assess for proctocolitis in appropriate clinical setting. No bowel obstruction. Reidentified bulky retroperitoneal lymphadenopathy with cluster of multiple enlarged left paraaortic and pelvic lymph nodes, stable compared to recent prior study./fall precautions

## 2022-02-15 NOTE — PROGRESS NOTE ADULT - ASSESSMENT
40 yo female w/PMH of ESRD (M/W/F), GERD, bacteremia, seizures, epilepsy, depression, COPD, HTN, heroin abuse, RA, Cdiff colitis in Dec 2021 smoker, recent admission for Aspiration Pna who  presents to [place]  ED via ambulance from dialysis center for brief loss of consciousness, questionable seizure vs syncope.        ESRD on HD TTS   MRSA bacteremia w persistent fevers - now Candidemia on 2/11 ( same day catheter was inserted )   Catheter ok to maintain per ID  HD complete now as ordered, 4k  UF as tolerated      Will need perm access - Vascular following for access eventual Right AVF or AVG when stable      HTN  - oral meds

## 2022-02-15 NOTE — PROGRESS NOTE ADULT - SUBJECTIVE AND OBJECTIVE BOX
Patient is a 41y Female who reports no complaints as new, seen with HD  MEDICATIONS  (STANDING):  apixaban 2.5 milliGRAM(s) Oral two times a day  carboxymethylcellulose 0.5% Ophthalmic Solution 1 Drop(s) Both EYES two times a day  caspofungin IVPB 50 milliGRAM(s) IV Intermittent every 24 hours  caspofungin IVPB      chlorhexidine 4% Liquid 1 Application(s) Topical <User Schedule>  collagenase Ointment 1 Application(s) Topical daily  epoetin amee-epbx (RETACRIT) Injectable 07175 Unit(s) IV Push <User Schedule>  gabapentin 100 milliGRAM(s) Oral three times a day  hydroxychloroquine 200 milliGRAM(s) Oral daily  levETIRAcetam 250 milliGRAM(s) Oral <User Schedule>  levETIRAcetam 500 milliGRAM(s) Oral daily  metoprolol tartrate 12.5 milliGRAM(s) Oral two times a day  pantoprazole    Tablet 40 milliGRAM(s) Oral before breakfast  sevelamer carbonate 800 milliGRAM(s) Oral three times a day with meals  vancomycin  IVPB 500 milliGRAM(s) IV Intermittent <User Schedule>    MEDICATIONS  (PRN):  acetaminophen     Tablet .. 650 milliGRAM(s) Oral every 6 hours PRN Temp greater or equal to 38.5C (101.3F), Mild Pain (1 - 3)  acetaminophen   IVPB .. 1000 milliGRAM(s) IV Intermittent once PRN Temp greater or equal to 38.5C (101.3F)  albumin human 25% IVPB 50 milliLiter(s) IV Intermittent every 1 hour PRN SBP less than 100  ALBUTerol    90 MICROgram(s) HFA Inhaler 2 Puff(s) Inhalation every 6 hours PRN Shortness of Breath and/or Wheezing  diphenhydrAMINE 25 milliGRAM(s) Oral every 6 hours PRN Rash and/or Itching  loperamide 2 milliGRAM(s) Oral every 6 hours PRN Diarrhea  melatonin 3 milliGRAM(s) Oral at bedtime PRN Insomnia  oxycodone    5 mG/acetaminophen 325 mG 1 Tablet(s) Oral every 8 hours PRN Severe Pain (7 - 10)  senna 2 Tablet(s) Oral at bedtime PRN Constipation  sodium chloride 0.9% lock flush 10 milliLiter(s) IV Push every 1 hour PRN Pre/post blood products, medications, blood draw, and to maintain line patency        T(C): , Max: 38 (02-14-22 @ 19:00)  T(F): , Max: 100.4 (02-14-22 @ 19:00)  HR: 102 (02-15-22 @ 14:20)  BP: 131/93 (02-15-22 @ 14:20)  BP(mean): --  RR: 18 (02-15-22 @ 14:20)  SpO2: 96% (02-15-22 @ 10:53)  Wt(kg): --    02-15 @ 07:01  -  02-15 @ 18:52  --------------------------------------------------------  IN: 0 mL / OUT: 1000 mL / NET: -1000 mL          PHYSICAL EXAM:    Constitutional:frail, ill appearing  HEENT: dry MM  dist  Cardiovascular: S1 and S2  Extremities: No peripheral edema  Neurological: Asleep but arousable  hd cath        LABS:                        9.4    10.86 )-----------( 253      ( 15 Feb 2022 08:07 )             28.1     15 Feb 2022 10:52    131    |  97     |  28     ----------------------------<  91     3.0     |  23     |  5.21   12 Feb 2022 10:50    126    |  92     |  28     ----------------------------<  78     3.4     |  21     |  4.96     Ca    7.8        15 Feb 2022 10:52  Ca    7.8        12 Feb 2022 10:50  Phos  4.0       15 Feb 2022 10:52  Phos  4.5       12 Feb 2022 10:50  Mg     1.7       12 Feb 2022 10:50    TPro  x      /  Alb  1.6    /  TBili  x      /  DBili  x      /  AST  x      /  ALT  x      /  AlkPhos  x      15 Feb 2022 10:52          Urine Studies:          RADIOLOGY & ADDITIONAL STUDIES:

## 2022-02-15 NOTE — PROGRESS NOTE ADULT - SUBJECTIVE AND OBJECTIVE BOX
Chief Complaint: Loss of consciousness    Interval History: No acute events overnight. Patient clinically stable. Reports that she feels generally far better of late. On vancomycin IV post dialysis for MRSA bacteremia, on IV Caspofungin for C.albicans fungemia. Repeat blood cultures from 2/12 so far with no growth to date. For HD session today.     ROS: Multi system review is notable for fever, malaise, general debility otherwise negative x 10 systems    Physical Exam:  T(F): 98 (15 Feb 2022 10:53), Max: 100.4 (14 Feb 2022 19:00)  HR: 102 (15 Feb 2022 14:20) (84 - 106)  BP: 131/93 (15 Feb 2022 14:20) (126/92 - 164/105)  RR: 18 (15 Feb 2022 14:20) (18 - 20)  SpO2: 96% (15 Feb 2022 10:53) (96% - 97%) on 2L/min    GEN: No acute distress  HEENT: NCAT PERRL EOMI  NECK: R IJ Shiley catheter in place, neck supple  CHEST: CTA b/l, no wheezes, rales or rhonchi  CVS: S1 S2 normal, tachycardic to 100  ABD: Soft, non tender, non distended, no rebound, no guarding  EXT: No calf tenderness, no erythema  SKIN: Warm, dry, stage IV sacral ulcer without locoregional tenderness, induration, fluctuance, no purulence  NEURO: Alert, awake, grossly without deficits    Labs:                     CBC 2/15                        9.4    10.86 )-------( 253             28.1     BMP 2/15    131  |  97  |  28  ---------------------<  91  3.0   |  23  |  5.21    Ca 7.8 (WNL when corrected for albumin)  Phos 4.0    TPro  6.1  /  Alb  1.8  /  TBili  0.3  /  DBili  x   /  AST  7  /  ALT  8  /  AlkPhos  76      Lactate 6.1 --> 0.9    Micro:  COVID19 PCR 2/13: Negative  Cdiff PCR 2/12: Negative  Blood culture 2/12: No growth to date  Blood culture 2/11: C.albicans  COVID19 PCR 2/10: Neg  Blood culture 2/8: Negative  Blood culture 2/6: Negative  Blood culture 2/4: MRSA    Imaging:  CT chest 2/12: Near complete resolution of previously identified mucoid debris in bronchus intermedius and bilateral lower lobe bronchi with tree-in-bud nodularity, right greater than left. No consolidation or pleural effusion. Asymmetric right breast density with nonspecific soft tissue infiltration extending to right chest wall.    CT abd/pelvis 2/12: Rectal tube identified, tip at the level of the rectosigmoid junction containing contrast. Mild wall thickening of the rectosigmoid junction. No bowel obstruction. Reidentified bulky retroperitoneal lymphadenopathy with cluster of multiple enlarged left paraaortic and pelvic lymph nodes, stable compared to recent prior study.    Cardiac Testing:  EKG 2/12: Sinus tachycardia,     TTE 2/8 : The left ventricle is normal in size. Moderately reduced left ventricular systolic function. Estimated left ventricular ejection fraction is 40%. Normal appearing right atrium. A catheter wire is seen in the right atrium. Normal aortic valve structure and function. Trivial aortic regurgitation is present. Normal appearing mitral valve structure and function. Mild (1+) mitral regurgitation is present.    Meds:  MEDICATIONS  (STANDING):  apixaban 2.5 milliGRAM(s) Oral two times a day  carboxymethylcellulose 0.5% Ophthalmic Solution 1 Drop(s) Both EYES two times a day  caspofungin IVPB 50 milliGRAM(s) IV Intermittent every 24 hours  chlorhexidine 4% Liquid 1 Application(s) Topical <User Schedule>  collagenase Ointment 1 Application(s) Topical daily  epoetin amee-epbx (RETACRIT) Injectable 52937 Unit(s) IV Push <User Schedule>  gabapentin 100 milliGRAM(s) Oral three times a day  hydroxychloroquine 200 milliGRAM(s) Oral daily  levETIRAcetam 250 milliGRAM(s) Oral <User Schedule>  levETIRAcetam 500 milliGRAM(s) Oral daily  metoprolol tartrate 12.5 milliGRAM(s) Oral two times a day  pantoprazole    Tablet 40 milliGRAM(s) Oral before breakfast  sevelamer carbonate 800 milliGRAM(s) Oral three times a day with meals  vancomycin  IVPB 500 milliGRAM(s) IV Intermittent <User Schedule>    MEDICATIONS  (PRN):  acetaminophen     Tablet .. 650 milliGRAM(s) Oral every 6 hours PRN Temp greater or equal to 38.5C (101.3F), Mild Pain (1 - 3)  acetaminophen   IVPB .. 1000 milliGRAM(s) IV Intermittent once PRN Temp greater or equal to 38.5C (101.3F)  albumin human 25% IVPB 50 milliLiter(s) IV Intermittent every 1 hour PRN SBP less than 100  ALBUTerol    90 MICROgram(s) HFA Inhaler 2 Puff(s) Inhalation every 6 hours PRN Shortness of Breath and/or Wheezing  diphenhydrAMINE 25 milliGRAM(s) Oral every 6 hours PRN Rash and/or Itching  loperamide 2 milliGRAM(s) Oral every 6 hours PRN Diarrhea  melatonin 3 milliGRAM(s) Oral at bedtime PRN Insomnia  oxycodone    5 mG/acetaminophen 325 mG 1 Tablet(s) Oral every 8 hours PRN Severe Pain (7 - 10)  senna 2 Tablet(s) Oral at bedtime PRN Constipation  sodium chloride 0.9% lock flush 10 milliLiter(s) IV Push every 1 hour PRN Pre/post blood products, medications, blood draw, and to maintain line patency   Chief Complaint: Loss of consciousness    Interval History: No acute events overnight. Patient clinically stable. Reports that she feels generally far better of late. On vancomycin IV post dialysis for MRSA bacteremia, on IV Caspofungin for C.albicans fungemia. Repeat blood cultures from 2/12 so far with no growth to date. For HD session today.     ROS: Multi system review is comprehensively negative x 10 systems except for chronic severe physical deconditioning, sacral decubitus wound    Physical Exam:  T(F): 98 (15 Feb 2022 10:53), Max: 100.4 (14 Feb 2022 19:00)  HR: 102 (15 Feb 2022 14:20) (84 - 106)  BP: 131/93 (15 Feb 2022 14:20) (126/92 - 164/105)  RR: 18 (15 Feb 2022 14:20) (18 - 20)  SpO2: 96% (15 Feb 2022 10:53) (96% - 97%) on 2L/min    GEN: No acute distress  HEENT: NCAT PERRL EOMI  NECK: R IJ Shiley catheter in place, neck supple  CHEST: CTA b/l, no wheezes, rales or rhonchi  CVS: S1 S2 normal, tachycardic to 100  ABD: Soft, non tender, non distended, no rebound, no guarding  EXT: No calf tenderness, no erythema  SKIN: Warm, dry, stage IV sacral ulcer without locoregional tenderness, induration, fluctuance, no purulence  NEURO: Alert, awake, grossly without deficits    Labs:                     CBC 2/15                        9.4    10.86 )-------( 253             28.1     BMP 2/15    131  |  97  |  28  ---------------------<  91  3.0   |  23  |  5.21    Ca 7.8 (WNL when corrected for albumin)  Phos 4.0    TPro  6.1  /  Alb  1.8  /  TBili  0.3  /  DBili  x   /  AST  7  /  ALT  8  /  AlkPhos  76      Lactate 6.1 --> 0.9    Micro:  COVID19 PCR 2/13: Negative  Cdiff PCR 2/12: Negative  Blood culture 2/12: No growth to date  Blood culture 2/11: C.albicans  COVID19 PCR 2/10: Neg  Blood culture 2/8: Negative  Blood culture 2/6: Negative  Blood culture 2/4: MRSA    Imaging:  CT chest 2/12: Near complete resolution of previously identified mucoid debris in bronchus intermedius and bilateral lower lobe bronchi with tree-in-bud nodularity, right greater than left. No consolidation or pleural effusion. Asymmetric right breast density with nonspecific soft tissue infiltration extending to right chest wall.    CT abd/pelvis 2/12: Rectal tube identified, tip at the level of the rectosigmoid junction containing contrast. Mild wall thickening of the rectosigmoid junction. No bowel obstruction. Reidentified bulky retroperitoneal lymphadenopathy with cluster of multiple enlarged left paraaortic and pelvic lymph nodes, stable compared to recent prior study.    Cardiac Testing:  EKG 2/12: Sinus tachycardia,     TTE 2/8 : The left ventricle is normal in size. Moderately reduced left ventricular systolic function. Estimated left ventricular ejection fraction is 40%. Normal appearing right atrium. A catheter wire is seen in the right atrium. Normal aortic valve structure and function. Trivial aortic regurgitation is present. Normal appearing mitral valve structure and function. Mild (1+) mitral regurgitation is present.    Meds:  MEDICATIONS  (STANDING):  apixaban 2.5 milliGRAM(s) Oral two times a day  carboxymethylcellulose 0.5% Ophthalmic Solution 1 Drop(s) Both EYES two times a day  caspofungin IVPB 50 milliGRAM(s) IV Intermittent every 24 hours  chlorhexidine 4% Liquid 1 Application(s) Topical <User Schedule>  collagenase Ointment 1 Application(s) Topical daily  epoetin amee-epbx (RETACRIT) Injectable 01123 Unit(s) IV Push <User Schedule>  gabapentin 100 milliGRAM(s) Oral three times a day  hydroxychloroquine 200 milliGRAM(s) Oral daily  levETIRAcetam 250 milliGRAM(s) Oral <User Schedule>  levETIRAcetam 500 milliGRAM(s) Oral daily  metoprolol tartrate 12.5 milliGRAM(s) Oral two times a day  pantoprazole    Tablet 40 milliGRAM(s) Oral before breakfast  sevelamer carbonate 800 milliGRAM(s) Oral three times a day with meals  vancomycin  IVPB 500 milliGRAM(s) IV Intermittent <User Schedule>    MEDICATIONS  (PRN):  acetaminophen     Tablet .. 650 milliGRAM(s) Oral every 6 hours PRN Temp greater or equal to 38.5C (101.3F), Mild Pain (1 - 3)  acetaminophen   IVPB .. 1000 milliGRAM(s) IV Intermittent once PRN Temp greater or equal to 38.5C (101.3F)  albumin human 25% IVPB 50 milliLiter(s) IV Intermittent every 1 hour PRN SBP less than 100  ALBUTerol    90 MICROgram(s) HFA Inhaler 2 Puff(s) Inhalation every 6 hours PRN Shortness of Breath and/or Wheezing  diphenhydrAMINE 25 milliGRAM(s) Oral every 6 hours PRN Rash and/or Itching  loperamide 2 milliGRAM(s) Oral every 6 hours PRN Diarrhea  melatonin 3 milliGRAM(s) Oral at bedtime PRN Insomnia  oxycodone    5 mG/acetaminophen 325 mG 1 Tablet(s) Oral every 8 hours PRN Severe Pain (7 - 10)  senna 2 Tablet(s) Oral at bedtime PRN Constipation  sodium chloride 0.9% lock flush 10 milliLiter(s) IV Push every 1 hour PRN Pre/post blood products, medications, blood draw, and to maintain line patency

## 2022-02-15 NOTE — PROGRESS NOTE ADULT - ASSESSMENT
41 year-old woman with ESRD on HD, rheumatoid arthritis, Raynaud's, tobacco and IV drug use disorder, admitted for episode of loss of consciousness, sepsis with organ dysfunction, and dialysis catheter tip thrombus, found to have MRSA bacteremia, since found to also have C.abicans on blood cultures taken when she was noted to have persisting fever despite removal of permcath and several doses of IV vancomycin.       Sepsis with organ dysfunction (elevated lactate) due to MRSA bacteremia and now C.albicans in blood culture   In setting of IV drug use disorder. MRSA on blood culture 2/4. Dialysis catheter removed 2/7. R IJ Shiley since placed. Has been on IV vancomycin post dialysis but patient was noted to have persisting high fevers, found to have C.albicans on blood culture 2/11. TTE negative for vegetations. ID following. On Vancomycin with HD. Started on Caspofungin 2/12, day 4 today. Fever curve improved. Lactic acidosis resolved. Sacral decubitus wound clean, does not appear to be source of sepsis.    - Continue vancomycin post dialysis, next due today  - Continue caspofungin, day 4  - F/u repeat cultures, so far no growth to date  - Monitor for fever, maintain MAP > 65  - Opthalmology eval in process of being requested, awaiting call back from specialist    Sinus tachycardia  In setting of acute infection.   - Continue to manage sepsis    Dialysis catheter tip thrombus  Catheter since removed due to MRSA bacteremia and sepsis. On Eliquis.  - Continue Eliquis    ESRD on HD  Receiving dialysis sessions as per Nephrology, currently via temporary HD catheter as patients permcath was removed this admission on 2/7 in setting of sepsis, bacteremia. Will need more permanent HD access once clinically improved and bacteremia / fungemia cleared. Vascular Surgery following.   - Continue dialysis sessions  - Begin planning for new permcath placement by IR as repeat cultures remain negative to date, possibly late this week  - Continue to save R arm (no BP cuffs, IVs or blood draws) and will plan for AVF creation when patient's acute infection resolves    CKD anemia  May also be further compounded by septicemia and fungemia. S/P PRBC transfusion with HD 2/12  - Continue epo    Renal bone disease  -Continue sevelamer    Hyponatremia, hypokalemia  Etiology unclear. Will discuss with Nephrology re management.     Loose stools  No hematochezia or melena. No abdominal tenderness. FOBT negative. Cdiff PCR negative. CT with possible mild wall thickening at rectosigmoid junction.   - Will continue to monitor    Hx of seizure  - On Keppra    Rheumatoid arthritis  - Continue Plaquenil    Abnormal imaging of the breast  As noted on CT chest 2/12. Asymmetric right breast density with nonspecific soft tissue infiltration extending to right chest wall.   - Patient will need non-emergent breast imaging correlation to exclude underlying malignancy including inflammatory carcinoma.    Retroperitoneal lymphadenopathy with a cluster of multiple enlarged left paraaortic and pelvic lymph nodes  As noted on CT abd/pelvis 2/12 and imaging prior. Rather stable in size.   - Patient will need continued follow up regarding these findings    Severe protein calorie malnutrition  Appreciate input from Nutrition  - Encourage po intake, trend weight      Diet: Renal  DVT px: Eliquis  Code Status: Full  Dispo: To be determined      41 year-old woman with ESRD on HD, rheumatoid arthritis, Raynaud's, tobacco and IV drug use disorder, admitted for episode of loss of consciousness, sepsis with organ dysfunction, and dialysis catheter tip thrombus, found to have MRSA bacteremia, since found to also have C.abicans on blood cultures taken when she was noted to have persisting fever despite removal of permcath and several doses of IV vancomycin.       Sepsis with organ dysfunction (elevated lactate) due to MRSA bacteremia, present upon admission, unable to determine if PC source of her IV drug use disorder as source, now C.albicans in blood culture as well   In setting of IV drug use disorder. MRSA on blood culture 2/4. Dialysis catheter removed 2/7. R IJ Shiley since placed. Has been on IV vancomycin post dialysis but patient was noted to have persisting high fevers, found to have C.albicans on blood culture 2/11. TTE negative for vegetations. ID following. On Vancomycin with HD. Started on Caspofungin 2/12, day 4 today. Fever curve improved. Lactic acidosis resolved. Sacral decubitus wound clean, does not appear to be source of sepsis.    - Continue vancomycin post dialysis, next due today  - Continue caspofungin, day 4  - F/u repeat cultures, so far no growth to date  - Monitor for fever, maintain MAP > 65  - Opthalmology eval in process of being requested, awaiting call back from specialist    Sinus tachycardia  In setting of acute infection.   - Continue to manage sepsis    Dialysis catheter tip thrombus  Catheter since removed due to MRSA bacteremia and sepsis. On Eliquis.  - Continue Eliquis    ESRD on HD  Receiving dialysis sessions as per Nephrology, currently via temporary HD catheter as patients permcath was removed this admission on 2/7 in setting of sepsis, bacteremia. Will need more permanent HD access once clinically improved and bacteremia / fungemia cleared. Vascular Surgery following.   - Continue dialysis sessions  - Begin planning for new permcath placement by IR as repeat cultures remain negative to date, possibly late this week  - Continue to save R arm (no BP cuffs, IVs or blood draws) and will plan for AVF creation when patient's acute infection resolves    CKD anemia  May also be further compounded by septicemia and fungemia. S/P PRBC transfusion with HD 2/12  - Continue epo    Renal bone disease  -Continue sevelamer    Hyponatremia, hypokalemia  Etiology unclear. Will discuss with Nephrology re management.     Loose stools  No hematochezia or melena. No abdominal tenderness. FOBT negative. Cdiff PCR negative. CT with possible mild wall thickening at rectosigmoid junction.   - Will continue to monitor    Hx of seizure  - On Keppra    Rheumatoid arthritis  - Continue Plaquenil    Abnormal imaging of the breast  As noted on CT chest 2/12. Asymmetric right breast density with nonspecific soft tissue infiltration extending to right chest wall.   - Patient will need non-emergent breast imaging correlation to exclude underlying malignancy including inflammatory carcinoma.    Retroperitoneal lymphadenopathy with a cluster of multiple enlarged left paraaortic and pelvic lymph nodes  As noted on CT abd/pelvis 2/12 and imaging prior. Rather stable in size.   - Patient will need continued follow up regarding these findings    Severe protein calorie malnutrition  Appreciate input from Nutrition  - Encourage po intake, trend weight      Diet: Renal  DVT px: Eliquis  Code Status: Full  Dispo: To be determined

## 2022-02-15 NOTE — PHYSICAL THERAPY INITIAL EVALUATION ADULT - PERTINENT HX OF CURRENT PROBLEM, REHAB EVAL
40 yo female w/PMH of ESRD (M/W/F), GERD, bacteremia, seizures, epilepsy, depression, COPD, HTN, heroin abuse, RA, Cdiff colitis in Dec 2021 smoker, recent admission for Aspiration Pna who  presents to [facility] via ambulance from dialysis center for brief loss of consciousness, questionable seizure vs syncope.

## 2022-02-15 NOTE — PROGRESS NOTE ADULT - SUBJECTIVE AND OBJECTIVE BOX
Date of service: 02-15-22 @ 11:36    Pt seen and examined  Temjett down  Feels better  HD today     ROS:  no fever, denies dizziness, no HA, no SOB or cough, no abdominal pain, no constipation; no dysuria, no urinary frequency, no legs pain, no rashes    MEDICATIONS  (STANDING):  apixaban 2.5 milliGRAM(s) Oral two times a day  carboxymethylcellulose 0.5% Ophthalmic Solution 1 Drop(s) Both EYES two times a day  caspofungin IVPB 50 milliGRAM(s) IV Intermittent every 24 hours  caspofungin IVPB      chlorhexidine 4% Liquid 1 Application(s) Topical <User Schedule>  collagenase Ointment 1 Application(s) Topical daily  epoetin amee-epbx (RETACRIT) Injectable 61462 Unit(s) IV Push <User Schedule>  gabapentin 100 milliGRAM(s) Oral three times a day  hydroxychloroquine 200 milliGRAM(s) Oral daily  levETIRAcetam 250 milliGRAM(s) Oral <User Schedule>  levETIRAcetam 500 milliGRAM(s) Oral daily  metoprolol tartrate 12.5 milliGRAM(s) Oral two times a day  pantoprazole    Tablet 40 milliGRAM(s) Oral before breakfast  sevelamer carbonate 800 milliGRAM(s) Oral three times a day with meals  vancomycin  IVPB 500 milliGRAM(s) IV Intermittent once    Vital Signs Last 24 Hrs  T(C): 36.7 (15 Feb 2022 10:53), Max: 38 (14 Feb 2022 19:00)  T(F): 98 (15 Feb 2022 10:53), Max: 100.4 (14 Feb 2022 19:00)  HR: 89 (15 Feb 2022 11:00) (84 - 102)  BP: 139/93 (15 Feb 2022 11:00) (135/86 - 164/105)  BP(mean): --  RR: 18 (15 Feb 2022 11:00) (18 - 20)  SpO2: 96% (15 Feb 2022 10:53) (96% - 97%)    Physical exam:  Constitutional: frail looking  HEENT: NC/AT, EOMI, PERRLA, conjunctivae clear; ears and nose atraumatic;  Neck: supple; thyroid not palpable  Shiley cath present  Back: no tenderness  Respiratory: respiratory effort normal; clear to auscultation  Cardiovascular: S1S2 regular, no murmurs  Abdomen: soft, not tender, not distended, positive BS; liver and spleen WNL  Genitourinary: no suprapubic tenderness  Lymphatic: no LN palpable  Musculoskeletal: no muscle tenderness, no joint swelling or tenderness  Extremities: no pedal edema  Neurological/ Psychiatric: AxOx3, Judgement and insight normal;  moving all extremities  Skin: no rashes; no palpable lesions, stage IV sacral ulcer wound clean no drainage    Labs: reviewed                          9.4    10.86 )-----------( 253      ( 15 Feb 2022 08:07 )             28.1     02-15    131<L>  |  97  |  28<H>  ----------------------------<  91  3.0<L>   |  23  |  5.21<H>    Ca    7.8<L>      15 Feb 2022 10:52  Phos  4.0     02-15    TPro  x   /  Alb  1.6<L>  /  TBili  x   /  DBili  x   /  AST  x   /  ALT  x   /  AlkPhos  x   02-15    Vancomycin Level, Trough: 19.9 ug/mL (02-10 @ 16:43)  Vancomycin Level, Trough: 21.2 ug/mL (02-09 @ 13:51)    Culture - Blood (02.12.22 @ 21:59)   Specimen Source: .Blood None   Culture Results:   No growth to date. Culture - Blood (02.12.22 @ 21:59)   Specimen Source: .Blood None   Culture Results:   No growth to date.   Culture - Blood (02.11.22 @ 08:08)   Gram Stain:   Growth in aerobic bottle: Yeast like cells   - Candida albicans: Detec     Culture - Blood (collected 08 Feb 2022 09:22)  Source: .Blood None  Preliminary Report (09 Feb 2022 15:05):    No growth to date.    Culture - Blood (collected 08 Feb 2022 09:22)  Source: .Blood None  Preliminary Report (09 Feb 2022 15:05):    No growth to date.    Culture - Blood (collected 06 Feb 2022 11:51)  Source: .Blood None  Final Report (11 Feb 2022 19:00):    No Growth Final    Culture - Blood (collected 06 Feb 2022 11:51)  Source: .Blood None  Final Report (11 Feb 2022 19:00):    No Growth Final    Culture - Blood (collected 04 Feb 2022 14:06)  Source: .Blood Blood-Peripheral  Gram Stain (05 Feb 2022 10:52):    Growth in aerobic bottle: Gram Positive Cocci in Clusters  Final Report (07 Feb 2022 07:37):    Growth in aerobic bottle: Methicillin Resistant Staphylococcus aureus  Organism: Blood Culture PCR  Methicillin resistant Staphylococcus aureus (07 Feb 2022 07:37)  Organism: Methicillin resistant Staphylococcus aureus (07 Feb 2022 07:37)      -  Ampicillin/Sulbactam: R 16/8      -  Cefazolin: R 16      -  Clindamycin: S <=0.25      -  Daptomycin: S 0.5      -  Erythromycin: R >4      -  Gentamicin: S <=1 Should not be used as monotherapy      -  Linezolid: S 2      -  Oxacillin: R >2      -  Penicillin: R >8      -  Rifampin: S <=1 Should not be used as monotherapy      -  Tetra/Doxy: S <=1      -  Trimethoprim/Sulfamethoxazole: S <=0.5/9.5      -  Vancomycin: S 1      Method Type: KRYSTEN  Organism: Blood Culture PCR (07 Feb 2022 07:37)      -  Methicillin resistant Staphylococcus aureus (MRSA): Detec      Method Type: PCR        Radiology: all available radiological tests reviewed        Questionable mild hyperenhancement of the urethra. Correlate with   urinalysis.    Findings were discussed with Dr. Epperson at 4:37 pm on 2/4/2022.        PROCEDURE DATE:  02/04/2022          INTERPRETATION:  CLINICAL INFORMATION: Infected decubitus ulcer    COMPARISON: 1/21/2022, 1/18/2022.    CONTRAST/COMPLICATIONS:  IV Contrast: Omnipaque 350  90 cc administered   10 cc discarded  Oral Contrast: NONE  Complications: None reported at time of study completion    PROCEDURE:  CT of the Abdomen and Pelvis was performed.  Sagittal and coronal reformats were performed.    FINDINGS:  LOWER CHEST: Within normal limits.    LIVER: There is thrombus surrounding the catheter tip in the intrahepatic   IVC.  BILE DUCTS: Normal caliber.  GALLBLADDER: Cholecystectomy.  SPLEEN: Borderline enlarged.  PANCREAS: Within normal limits.  ADRENALS: Within normal limits.  KIDNEYS/URETERS: Within normal limits.    BLADDER: Within normal limits. Question mild hyperenhancement of the   urethra.  REPRODUCTIVE ORGANS: Uterus and adnexa within normal limits.    BOWEL: No bowel obstruction. Large amount of fluid/stool throughout the   colon. Question mild thickening versus underdistention of the sigmoid   colon. Appendix is not visualized. No evidence of inflammation in the   pericecal region.  PERITONEUM: No ascites.  VESSELS: Within the intrahepatic IVC, as described above.  RETROPERITONEUM/LYMPH NODES: Multiple enlarged retroperitoneal nodes. . A   left para-aortic node measures 1.9 x 1.4 cm (2:54). A left common iliac   node measures 2.4 x 1.3 cm (2:75)  ABDOMINAL WALL: Left sacral decubitus ulcer without definite evidence of   osseous erosion.  BONES: Degenerative changes. Degenerative changes of the right hip.    IMPRESSION:  There is thrombus surrounding the catheter tip in the intrahepatic IVC.    Retroperitoneal adenopathy, as described above. Differential includes   lymphoma.    Mildly distended, fluid-filled colon. Mild thickening versus   underdistention of the sigmoid colon.    Left sacral decubitus ulcer without definite osseous erosion. Correlate   with MRI if clinically warranted.    Additional findings as above.      Advanced directives addressed: full resuscitation

## 2022-02-15 NOTE — PHYSICAL THERAPY INITIAL EVALUATION ADULT - GENERAL OBSERVATIONS, REHAB EVAL
Pt seen for 30min PT bedside Eval. Pt rec'd semi supine in bed in NAD, declining OOB 2/2 fatigue post HD, willing to participate in bedisde eval. History taken, pt ROM WFL, and strength grossly 3+/5 throughout, LLE contracted in knee flex, lacking grossly 40* ext. Pt left semi supine in bed in NAD, all needs met, +bed alarm, RN Valentina aware.

## 2022-02-15 NOTE — PROGRESS NOTE ADULT - REASON FOR ADMISSION
Episode of loss of consciousness, sepsis with organ dysfunction (elevated lactate), dialysis catheter tip thrombus

## 2022-02-15 NOTE — PHYSICAL THERAPY INITIAL EVALUATION ADULT - LIVES WITH, PROFILE
Pt lives with brother, rans[place] style home with ramp to enter./other relative Pt lives with brother, ranch style home with ramp to enter./other relative

## 2022-02-15 NOTE — PHYSICAL THERAPY INITIAL EVALUATION ADULT - ACTIVE RANGE OF MOTION EXAMINATION, REHAB EVAL
LLE knee flexion contracture, lacking ~40* knee ext./bilateral upper extremity Active ROM was WFL (within functional limits)/Right LE Active ROM was WFL (within functional limits)

## 2022-02-15 NOTE — PROGRESS NOTE ADULT - SUBJECTIVE AND OBJECTIVE BOX
Patient was seen and evaluated at bedside. Febrile 100.4, intermittently tachycardic, BP stable. Patient bactremic and fungemic.     Vital Signs (24 Hrs):  T(C): 36.7 (02-15-22 @ 10:53), Max: 38 (22 @ 19:00)  HR: 91 (02-15-22 @ 10:53) (91 - 102)  BP: 164/105 (02-15-22 @ 10:53) (154/99 - 164/105)  RR: 20 (02-15-22 @ 10:53) (18 - 20)  SpO2: 96% (02-15-22 @ 10:53) (96% - 97%)  Wt(kg): --  Daily     Daily Weight in k (15 Feb 2022 06:13)    I&O's Summary      Physical Exam:  General: AAOx3, in NAD  HEENT: NC/AT, EOMI, poor dentition  Cardio: S1S2, tachycardic  Pulm: equal air entry b/l, non labored  Chest: dressing over right chest c/d/i  Abdomen: soft, NT/ND  Extremities: multiple excoriations over all extremities      .  LABS:                         9.4    10.86 )-----------( 253      ( 15 Feb 2022 08:07 )             28.1                     RADIOLOGY, EKG & ADDITIONAL TESTS: Reviewed.

## 2022-02-16 LAB
HCT VFR BLD CALC: 26.3 % — LOW (ref 34.5–45)
HGB BLD-MCNC: 8.5 G/DL — LOW (ref 11.5–15.5)
MCHC RBC-ENTMCNC: 29 PG — SIGNIFICANT CHANGE UP (ref 27–34)
MCHC RBC-ENTMCNC: 32.3 GM/DL — SIGNIFICANT CHANGE UP (ref 32–36)
MCV RBC AUTO: 89.8 FL — SIGNIFICANT CHANGE UP (ref 80–100)
PLATELET # BLD AUTO: 256 K/UL — SIGNIFICANT CHANGE UP (ref 150–400)
RBC # BLD: 2.93 M/UL — LOW (ref 3.8–5.2)
RBC # FLD: 15.8 % — HIGH (ref 10.3–14.5)
SARS-COV-2 RNA SPEC QL NAA+PROBE: SIGNIFICANT CHANGE UP
WBC # BLD: 8.35 K/UL — SIGNIFICANT CHANGE UP (ref 3.8–10.5)
WBC # FLD AUTO: 8.35 K/UL — SIGNIFICANT CHANGE UP (ref 3.8–10.5)

## 2022-02-16 PROCEDURE — 99232 SBSQ HOSP IP/OBS MODERATE 35: CPT

## 2022-02-16 RX ORDER — FLUCONAZOLE 150 MG/1
400 TABLET ORAL ONCE
Refills: 0 | Status: COMPLETED | OUTPATIENT
Start: 2022-02-16 | End: 2022-02-16

## 2022-02-16 RX ORDER — FLUCONAZOLE 150 MG/1
200 TABLET ORAL EVERY 24 HOURS
Refills: 0 | Status: DISCONTINUED | OUTPATIENT
Start: 2022-02-17 | End: 2022-02-18

## 2022-02-16 RX ADMIN — OXYCODONE AND ACETAMINOPHEN 1 TABLET(S): 5; 325 TABLET ORAL at 18:36

## 2022-02-16 RX ADMIN — Medication 2 MILLIGRAM(S): at 09:08

## 2022-02-16 RX ADMIN — Medication 1 APPLICATION(S): at 09:11

## 2022-02-16 RX ADMIN — OXYCODONE AND ACETAMINOPHEN 1 TABLET(S): 5; 325 TABLET ORAL at 02:18

## 2022-02-16 RX ADMIN — APIXABAN 2.5 MILLIGRAM(S): 2.5 TABLET, FILM COATED ORAL at 09:09

## 2022-02-16 RX ADMIN — SEVELAMER CARBONATE 800 MILLIGRAM(S): 2400 POWDER, FOR SUSPENSION ORAL at 11:46

## 2022-02-16 RX ADMIN — Medication 12.5 MILLIGRAM(S): at 09:09

## 2022-02-16 RX ADMIN — Medication 1 DROP(S): at 21:43

## 2022-02-16 RX ADMIN — LEVETIRACETAM 500 MILLIGRAM(S): 250 TABLET, FILM COATED ORAL at 09:09

## 2022-02-16 RX ADMIN — OXYCODONE AND ACETAMINOPHEN 1 TABLET(S): 5; 325 TABLET ORAL at 11:20

## 2022-02-16 RX ADMIN — LEVETIRACETAM 250 MILLIGRAM(S): 250 TABLET, FILM COATED ORAL at 16:44

## 2022-02-16 RX ADMIN — FLUCONAZOLE 100 MILLIGRAM(S): 150 TABLET ORAL at 11:46

## 2022-02-16 RX ADMIN — OXYCODONE AND ACETAMINOPHEN 1 TABLET(S): 5; 325 TABLET ORAL at 10:41

## 2022-02-16 RX ADMIN — Medication 3 MILLIGRAM(S): at 21:42

## 2022-02-16 RX ADMIN — SEVELAMER CARBONATE 800 MILLIGRAM(S): 2400 POWDER, FOR SUSPENSION ORAL at 09:08

## 2022-02-16 RX ADMIN — Medication 650 MILLIGRAM(S): at 16:43

## 2022-02-16 RX ADMIN — Medication 200 MILLIGRAM(S): at 09:09

## 2022-02-16 RX ADMIN — SEVELAMER CARBONATE 800 MILLIGRAM(S): 2400 POWDER, FOR SUSPENSION ORAL at 16:44

## 2022-02-16 RX ADMIN — PANTOPRAZOLE SODIUM 40 MILLIGRAM(S): 20 TABLET, DELAYED RELEASE ORAL at 06:20

## 2022-02-16 RX ADMIN — Medication 12.5 MILLIGRAM(S): at 21:42

## 2022-02-16 NOTE — PROGRESS NOTE ADULT - ASSESSMENT
40 yo F with ESRD on HD, presents with bacteremia s/p RIJ PC removal, in need of HD access.    Plan:  -blood cultures from 2/11 show MRSA bacteremia and fungemia  -ID on board, recs appreciated  -nephro on board, recs appreciated  -will need temporary HD access but as patient is bacteremic and fungemic, patient cannot undergo surgery for fistula creation at this time  -continue to save right arm - no BP cuffs, IVs or blood draws  -will plan for AVF creation when patient's acute infection resolves as an outpatient  -recommend tunneled HD access by IR  -medical management as per primary team    Plan discussed with Dr. Lynch

## 2022-02-16 NOTE — PROGRESS NOTE ADULT - SUBJECTIVE AND OBJECTIVE BOX
Patient is a 41y Female who reports no complaints as new    * pt seen earlier today      fever 101 this afternoon     MEDICATIONS  (STANDING):  carboxymethylcellulose 0.5% Ophthalmic Solution 1 Drop(s) Both EYES two times a day  chlorhexidine 4% Liquid 1 Application(s) Topical <User Schedule>  collagenase Ointment 1 Application(s) Topical daily  epoetin amee-epbx (RETACRIT) Injectable 35473 Unit(s) IV Push <User Schedule>  gabapentin 100 milliGRAM(s) Oral three times a day  hydroxychloroquine 200 milliGRAM(s) Oral daily  levETIRAcetam 250 milliGRAM(s) Oral <User Schedule>  levETIRAcetam 500 milliGRAM(s) Oral daily  metoprolol tartrate 12.5 milliGRAM(s) Oral two times a day  pantoprazole    Tablet 40 milliGRAM(s) Oral before breakfast  sevelamer carbonate 800 milliGRAM(s) Oral three times a day with meals  vancomycin  IVPB 500 milliGRAM(s) IV Intermittent <User Schedule>      Vital Signs Last 24 Hrs  T(C): 36.8 (16 Feb 2022 19:36), Max: 38.4 (16 Feb 2022 16:40)  T(F): 98.3 (16 Feb 2022 19:36), Max: 101.1 (16 Feb 2022 16:40)  HR: 102 (16 Feb 2022 19:36) (102 - 105)  BP: 147/88 (16 Feb 2022 19:36) (142/88 - 147/88)  BP(mean): --  RR: 18 (16 Feb 2022 19:36) (18 - 18)  SpO2: 96% (16 Feb 2022 19:36) (96% - 100%)      I&O's Detail    15 Feb 2022 07:01  -  16 Feb 2022 07:00  --------------------------------------------------------  IN:  Total IN: 0 mL    OUT:    Other (mL): 1000 mL  Total OUT: 1000 mL    Total NET: -1000 mL      16 Feb 2022 07:01  -  16 Feb 2022 20:08  --------------------------------------------------------  IN:    IV PiggyBack: 200 mL  Total IN: 200 mL    OUT:  Total OUT: 0 mL    Total NET: 200 mL        PHYSICAL EXAM:    Constitutional:frail, ill appearing  HEENT: dry MM  dist  Cardiovascular: S1 and S2  Extremities: No peripheral edema  Neurological: Asleep but arousable  hd cath        LABS:                                   8.5    8.35  )-----------( 256      ( 16 Feb 2022 07:45 )             26.3                         9.4    10.86 )-----------( 253      ( 15 Feb 2022 08:07 )             28.1         131    |  97     |  28     ----------------------------<  91        15 Feb 2022 10:52  3.0     |  23     |  5.21     Ca    7.8        15 Feb 2022 10:52    Phos  4.0       15 Feb 2022 10:52      TPro  x      /  Alb  1.6    /  TBili  x      /        15 Feb 2022 10:52  DBili  x      /  AST  x      /  ALT  x      /  AlkPhos  x                Urine Studies:          RADIOLOGY & ADDITIONAL STUDIES:

## 2022-02-16 NOTE — PROGRESS NOTE ADULT - SUBJECTIVE AND OBJECTIVE BOX
Chief Complaint: Loss of consciousness    Interval History: No acute events overnight. Patient clinically stable. Reports that she feels generally far better of late. On vancomycin IV post dialysis for MRSA bacteremia, on IV Caspofungin for C.albicans fungemia. Repeat blood cultures from 2/12 so far with no growth to date. For HD session today.     ROS: Multi system review is notable for fever, malaise, general debility otherwise negative x 10 systems    Physical Exam:  T(F): 98 (15 Feb 2022 10:53), Max: 100.4 (14 Feb 2022 19:00)  HR: 102 (15 Feb 2022 14:20) (84 - 106)  BP: 131/93 (15 Feb 2022 14:20) (126/92 - 164/105)  RR: 18 (15 Feb 2022 14:20) (18 - 20)  SpO2: 96% (15 Feb 2022 10:53) (96% - 97%) on 2L/min    GEN: No acute distress  HEENT: NCAT PERRL EOMI  NECK: R IJ Shiley catheter in place, neck supple  CHEST: CTA b/l, no wheezes, rales or rhonchi  CVS: S1 S2 normal, tachycardic to 100  ABD: Soft, non tender, non distended, no rebound, no guarding  EXT: No calf tenderness, no erythema  SKIN: Warm, dry, stage IV sacral ulcer without locoregional tenderness, induration, fluctuance, no purulence  NEURO: Alert, awake, grossly without deficits    Labs:                     CBC 2/15                        9.4    10.86 )-------( 253             28.1     BMP 2/15    131  |  97  |  28  ---------------------<  91  3.0   |  23  |  5.21    Ca 7.8 (WNL when corrected for albumin)  Phos 4.0    TPro  6.1  /  Alb  1.8  /  TBili  0.3  /  DBili  x   /  AST  7  /  ALT  8  /  AlkPhos  76      Lactate 6.1 --> 0.9    Micro:  COVID19 PCR 2/13: Negative  Cdiff PCR 2/12: Negative  Blood culture 2/12: No growth to date  Blood culture 2/11: C.albicans  COVID19 PCR 2/10: Neg  Blood culture 2/8: Negative  Blood culture 2/6: Negative  Blood culture 2/4: MRSA    Imaging:  CT chest 2/12: Near complete resolution of previously identified mucoid debris in bronchus intermedius and bilateral lower lobe bronchi with tree-in-bud nodularity, right greater than left. No consolidation or pleural effusion. Asymmetric right breast density with nonspecific soft tissue infiltration extending to right chest wall.    CT abd/pelvis 2/12: Rectal tube identified, tip at the level of the rectosigmoid junction containing contrast. Mild wall thickening of the rectosigmoid junction. No bowel obstruction. Reidentified bulky retroperitoneal lymphadenopathy with cluster of multiple enlarged left paraaortic and pelvic lymph nodes, stable compared to recent prior study.    Cardiac Testing:  EKG 2/12: Sinus tachycardia,     TTE 2/8 : The left ventricle is normal in size. Moderately reduced left ventricular systolic function. Estimated left ventricular ejection fraction is 40%. Normal appearing right atrium. A catheter wire is seen in the right atrium. Normal aortic valve structure and function. Trivial aortic regurgitation is present. Normal appearing mitral valve structure and function. Mild (1+) mitral regurgitation is present.    Meds:  MEDICATIONS  (STANDING):  apixaban 2.5 milliGRAM(s) Oral two times a day  carboxymethylcellulose 0.5% Ophthalmic Solution 1 Drop(s) Both EYES two times a day  caspofungin IVPB 50 milliGRAM(s) IV Intermittent every 24 hours  chlorhexidine 4% Liquid 1 Application(s) Topical <User Schedule>  collagenase Ointment 1 Application(s) Topical daily  epoetin amee-epbx (RETACRIT) Injectable 17754 Unit(s) IV Push <User Schedule>  gabapentin 100 milliGRAM(s) Oral three times a day  hydroxychloroquine 200 milliGRAM(s) Oral daily  levETIRAcetam 250 milliGRAM(s) Oral <User Schedule>  levETIRAcetam 500 milliGRAM(s) Oral daily  metoprolol tartrate 12.5 milliGRAM(s) Oral two times a day  pantoprazole    Tablet 40 milliGRAM(s) Oral before breakfast  sevelamer carbonate 800 milliGRAM(s) Oral three times a day with meals  vancomycin  IVPB 500 milliGRAM(s) IV Intermittent <User Schedule>    MEDICATIONS  (PRN):  acetaminophen     Tablet .. 650 milliGRAM(s) Oral every 6 hours PRN Temp greater or equal to 38.5C (101.3F), Mild Pain (1 - 3)  acetaminophen   IVPB .. 1000 milliGRAM(s) IV Intermittent once PRN Temp greater or equal to 38.5C (101.3F)  albumin human 25% IVPB 50 milliLiter(s) IV Intermittent every 1 hour PRN SBP less than 100  ALBUTerol    90 MICROgram(s) HFA Inhaler 2 Puff(s) Inhalation every 6 hours PRN Shortness of Breath and/or Wheezing  diphenhydrAMINE 25 milliGRAM(s) Oral every 6 hours PRN Rash and/or Itching  loperamide 2 milliGRAM(s) Oral every 6 hours PRN Diarrhea  melatonin 3 milliGRAM(s) Oral at bedtime PRN Insomnia  oxycodone    5 mG/acetaminophen 325 mG 1 Tablet(s) Oral every 8 hours PRN Severe Pain (7 - 10)  senna 2 Tablet(s) Oral at bedtime PRN Constipation  sodium chloride 0.9% lock flush 10 milliLiter(s) IV Push every 1 hour PRN Pre/post blood products, medications, blood draw, and to maintain line patency   Chief Complaint: Loss of consciousness    Interval History: No acute events overnight. Patient clinically stable. Reports that she feels generally far better of late. 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 Repeat blood cultures from 2/12 and 2/15 so far with no growth to date. Anticipating IR placement of permath tomorrow and removal of IJ Shiley. Additionally, she continues to receive wound care to her sacral decubitus wound, appears to be improving, no sign of infection there.      ROS: Multi system review is comprehensively negative x 10 systems except for chronic severe physical deconditioning, sacral decubitus wound    Physical Exam:      GEN: No acute distress  HEENT: NCAT PERRL EOMI  NECK: R IJ Shiley catheter in place, neck supple  CHEST: CTA b/l, no wheezes, rales or rhonchi  CVS: S1 S2 normal, tachycardic to 100  ABD: Soft, non tender, non distended, no rebound, no guarding  EXT: No calf tenderness, no erythema  SKIN: Warm, dry, stage IV sacral ulcer without locoregional tenderness, induration, fluctuance, no purulence  NEURO: Alert, awake, grossly without deficits    Labs:                     CBC 2/15                        9.4    10.86 )-------( 253             28.1     BMP 2/15    131  |  97  |  28  ---------------------<  91  3.0   |  23  |  5.21    Ca 7.8 (WNL when corrected for albumin)  Phos 4.0    TPro  6.1  /  Alb  1.8  /  TBili  0.3  /  DBili  x   /  AST  7  /  ALT  8  /  AlkPhos  76      Lactate 6.1 --> 0.9    Micro:  COVID19 PCR 2/13: Negative  Cdiff PCR 2/12: Negative  Blood culture 2/12: No growth to date  Blood culture 2/11: C.albicans  COVID19 PCR 2/10: Neg  Blood culture 2/8: Negative  Blood culture 2/6: Negative  Blood culture 2/4: MRSA    Imaging:  CT chest 2/12: Near complete resolution of previously identified mucoid debris in bronchus intermedius and bilateral lower lobe bronchi with tree-in-bud nodularity, right greater than left. No consolidation or pleural effusion. Asymmetric right breast density with nonspecific soft tissue infiltration extending to right chest wall.    CT abd/pelvis 2/12: Rectal tube identified, tip at the level of the rectosigmoid junction containing contrast. Mild wall thickening of the rectosigmoid junction. No bowel obstruction. Reidentified bulky retroperitoneal lymphadenopathy with cluster of multiple enlarged left paraaortic and pelvic lymph nodes, stable compared to recent prior study.    Cardiac Testing:  EKG 2/12: Sinus tachycardia,     TTE 2/8 : The left ventricle is normal in size. Moderately reduced left ventricular systolic function. Estimated left ventricular ejection fraction is 40%. Normal appearing right atrium. A catheter wire is seen in the right atrium. Normal aortic valve structure and function. Trivial aortic regurgitation is present. Normal appearing mitral valve structure and function. Mild (1+) mitral regurgitation is present.    Meds:  MEDICATIONS  (STANDING):  apixaban 2.5 milliGRAM(s) Oral two times a day  carboxymethylcellulose 0.5% Ophthalmic Solution 1 Drop(s) Both EYES two times a day  caspofungin IVPB 50 milliGRAM(s) IV Intermittent every 24 hours  chlorhexidine 4% Liquid 1 Application(s) Topical <User Schedule>  collagenase Ointment 1 Application(s) Topical daily  epoetin amee-epbx (RETACRIT) Injectable 96764 Unit(s) IV Push <User Schedule>  gabapentin 100 milliGRAM(s) Oral three times a day  hydroxychloroquine 200 milliGRAM(s) Oral daily  levETIRAcetam 250 milliGRAM(s) Oral <User Schedule>  levETIRAcetam 500 milliGRAM(s) Oral daily  metoprolol tartrate 12.5 milliGRAM(s) Oral two times a day  pantoprazole    Tablet 40 milliGRAM(s) Oral before breakfast  sevelamer carbonate 800 milliGRAM(s) Oral three times a day with meals  vancomycin  IVPB 500 milliGRAM(s) IV Intermittent <User Schedule>    MEDICATIONS  (PRN):  acetaminophen     Tablet .. 650 milliGRAM(s) Oral every 6 hours PRN Temp greater or equal to 38.5C (101.3F), Mild Pain (1 - 3)  acetaminophen   IVPB .. 1000 milliGRAM(s) IV Intermittent once PRN Temp greater or equal to 38.5C (101.3F)  albumin human 25% IVPB 50 milliLiter(s) IV Intermittent every 1 hour PRN SBP less than 100  ALBUTerol    90 MICROgram(s) HFA Inhaler 2 Puff(s) Inhalation every 6 hours PRN Shortness of Breath and/or Wheezing  diphenhydrAMINE 25 milliGRAM(s) Oral every 6 hours PRN Rash and/or Itching  loperamide 2 milliGRAM(s) Oral every 6 hours PRN Diarrhea  melatonin 3 milliGRAM(s) Oral at bedtime PRN Insomnia  oxycodone    5 mG/acetaminophen 325 mG 1 Tablet(s) Oral every 8 hours PRN Severe Pain (7 - 10)  senna 2 Tablet(s) Oral at bedtime PRN Constipation  sodium chloride 0.9% lock flush 10 milliLiter(s) IV Push every 1 hour PRN Pre/post blood products, medications, blood draw, and to maintain line patency   Chief Complaint: Loss of consciousness    Interval History: No acute events overnight. Patient clinically stable. Reports that she feels generally far better of late. 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 Repeat blood cultures from 2/12 and 2/15 so far with no growth to date. Anticipating IR placement of permath tomorrow and removal of IJ Shiley. Additionally, she continues to receive wound care to her sacral decubitus wound, appears to be improving, no sign of infection there.      ROS: Multi system review is comprehensively negative x 10 systems except for chronic severe physical deconditioning, sacral decubitus wound    Physical Exam:  T(F): 98.9 (16 Feb 2022 08:53), Max: 98.9 (16 Feb 2022 08:53)  HR: 105 (16 Feb 2022 08:53) (98 - 105)  BP: 142/88 (16 Feb 2022 08:53) (125/87 - 142/88)  RR: 18 (16 Feb 2022 08:53) (18 - 18)  SpO2: 100% (16 Feb 2022 08:53) (97% - 100%) on 2L/min via NC    GEN: No acute distress  HEENT: NCAT PERRL EOMI  NECK: R IJ Shiley catheter in place, neck supple  CHEST: CTA b/l, no wheezes, rales or rhonchi  CVS: S1 S2 normal, tachycardic to 100  ABD: Soft, non tender, non distended, no rebound, no guarding  EXT: No calf tenderness, no erythema  SKIN: Warm, dry, stage IV sacral ulcer without locoregional tenderness, induration, fluctuance, no purulence  NEURO: Alert, awake, grossly without deficits    Labs:                     CBC 2/16                       8.5    8.35 )-------( 256            26.3     BMP 2/15    131  |  97  |  28  ---------------------<  91  3.0   |  23  |  5.21    Ca 7.8 (WNL when corrected for albumin)  Phos 4.0    TPro  6.1  /  Alb  1.8  /  TBili  0.3  /  DBili  x   /  AST  7  /  ALT  8  /  AlkPhos  76      Lactate 6.1 --> 0.9    Micro:  Blood culture 2/15: No growth to date  COVID19 PCR 2/13: Negative  Cdiff PCR 2/12: Negative  Blood culture 2/12: No growth to date  Blood culture 2/11: C.albicans, C.tropicalis  COVID19 PCR 2/10: Neg  Blood culture 2/8: Negative  Blood culture 2/6: Negative  Blood culture 2/4: MRSA    Imaging:  CT chest 2/12: Near complete resolution of previously identified mucoid debris in bronchus intermedius and bilateral lower lobe bronchi with tree-in-bud nodularity, right greater than left. No consolidation or pleural effusion. Asymmetric right breast density with nonspecific soft tissue infiltration extending to right chest wall.    CT abd/pelvis 2/12: Rectal tube identified, tip at the level of the rectosigmoid junction containing contrast. Mild wall thickening of the rectosigmoid junction. No bowel obstruction. Reidentified bulky retroperitoneal lymphadenopathy with cluster of multiple enlarged left paraaortic and pelvic lymph nodes, stable compared to recent prior study.    Cardiac Testing:  Tele 2/16: Sinus tachycardia,     EKG 2/12: Sinus tachycardia,     TTE 2/8 : The left ventricle is normal in size. Moderately reduced left ventricular systolic function. Estimated left ventricular ejection fraction is 40%. Normal appearing right atrium. A catheter wire is seen in the right atrium. Normal aortic valve structure and function. Trivial aortic regurgitation is present. Normal appearing mitral valve structure and function. Mild (1+) mitral regurgitation is present.    Meds:  MEDICATIONS  (STANDING):  carboxymethylcellulose 0.5% Ophthalmic Solution 1 Drop(s) Both EYES two times a day  chlorhexidine 4% Liquid 1 Application(s) Topical <User Schedule>  collagenase Ointment 1 Application(s) Topical daily  epoetin amee-epbx (RETACRIT) Injectable 40846 Unit(s) IV Push <User Schedule>  gabapentin 100 milliGRAM(s) Oral three times a day  hydroxychloroquine 200 milliGRAM(s) Oral daily  levETIRAcetam 250 milliGRAM(s) Oral <User Schedule>  levETIRAcetam 500 milliGRAM(s) Oral daily  metoprolol tartrate 12.5 milliGRAM(s) Oral two times a day  pantoprazole    Tablet 40 milliGRAM(s) Oral before breakfast  sevelamer carbonate 800 milliGRAM(s) Oral three times a day with meals  vancomycin  IVPB 500 milliGRAM(s) IV Intermittent <User Schedule>    MEDICATIONS  (PRN):  acetaminophen     Tablet .. 650 milliGRAM(s) Oral every 6 hours PRN Temp greater or equal to 38.5C (101.3F), Mild Pain (1 - 3)  acetaminophen   IVPB .. 1000 milliGRAM(s) IV Intermittent once PRN Temp greater or equal to 38.5C (101.3F)  albumin human 25% IVPB 50 milliLiter(s) IV Intermittent every 1 hour PRN SBP less than 100  ALBUTerol    90 MICROgram(s) HFA Inhaler 2 Puff(s) Inhalation every 6 hours PRN Shortness of Breath and/or Wheezing  diphenhydrAMINE 25 milliGRAM(s) Oral every 6 hours PRN Rash and/or Itching  loperamide 2 milliGRAM(s) Oral every 6 hours PRN Diarrhea  melatonin 3 milliGRAM(s) Oral at bedtime PRN Insomnia  oxycodone    5 mG/acetaminophen 325 mG 1 Tablet(s) Oral every 8 hours PRN Severe Pain (7 - 10)  senna 2 Tablet(s) Oral at bedtime PRN Constipation  sodium chloride 0.9% lock flush 10 milliLiter(s) IV Push every 1 hour PRN Pre/post blood products, medications, blood draw, and to maintain line patency

## 2022-02-16 NOTE — CONSULT NOTE ADULT - PROVIDER SPECIALTY LIST ADULT
Vascular Surgery
Palliative Care
Infectious Disease
Infectious Disease
Nephrology
Heme/Onc
Intervent Radiology

## 2022-02-16 NOTE — PROGRESS NOTE ADULT - SUBJECTIVE AND OBJECTIVE BOX
Date of service: 02-16-22 @ 10:37    Pt seen and examined  Temps down  Feeling better  No new complaints     ROS:  no fever, denies dizziness, no HA, no SOB or cough, no abdominal pain, no constipation; no dysuria, no urinary frequency, no legs pain, no rashes    MEDICATIONS  (STANDING):  apixaban 2.5 milliGRAM(s) Oral two times a day  carboxymethylcellulose 0.5% Ophthalmic Solution 1 Drop(s) Both EYES two times a day  chlorhexidine 4% Liquid 1 Application(s) Topical <User Schedule>  collagenase Ointment 1 Application(s) Topical daily  epoetin amee-epbx (RETACRIT) Injectable 48817 Unit(s) IV Push <User Schedule>  fluconAZOLE IVPB 400 milliGRAM(s) IV Intermittent once  gabapentin 100 milliGRAM(s) Oral three times a day  hydroxychloroquine 200 milliGRAM(s) Oral daily  levETIRAcetam 250 milliGRAM(s) Oral <User Schedule>  levETIRAcetam 500 milliGRAM(s) Oral daily  metoprolol tartrate 12.5 milliGRAM(s) Oral two times a day  pantoprazole    Tablet 40 milliGRAM(s) Oral before breakfast  sevelamer carbonate 800 milliGRAM(s) Oral three times a day with meals  vancomycin  IVPB 500 milliGRAM(s) IV Intermittent <User Schedule>    Vital Signs Last 24 Hrs  T(C): 37.2 (16 Feb 2022 08:53), Max: 37.2 (16 Feb 2022 08:53)  T(F): 98.9 (16 Feb 2022 08:53), Max: 98.9 (16 Feb 2022 08:53)  HR: 105 (16 Feb 2022 08:53) (89 - 106)  BP: 142/88 (16 Feb 2022 08:53) (125/87 - 164/105)  BP(mean): --  RR: 18 (16 Feb 2022 08:53) (18 - 20)  SpO2: 100% (16 Feb 2022 08:53) (96% - 100%)      Physical exam:  Constitutional: frail looking  HEENT: NC/AT, EOMI, PERRLA, conjunctivae clear; ears and nose atraumatic;  Neck: supple; thyroid not palpable  Shiley cath present  Back: no tenderness  Respiratory: respiratory effort normal; clear to auscultation  Cardiovascular: S1S2 regular, no murmurs  Abdomen: soft, not tender, not distended, positive BS; liver and spleen WNL  Genitourinary: no suprapubic tenderness  Lymphatic: no LN palpable  Musculoskeletal: no muscle tenderness, no joint swelling or tenderness  Extremities: no pedal edema  Neurological/ Psychiatric: AxOx3, Judgement and insight normal;  moving all extremities  Skin: no rashes; no palpable lesions, stage IV sacral ulcer wound clean no drainage    Labs: reviewed                        8.5    8.35  )-----------( 256      ( 16 Feb 2022 07:45 )             26.3     02-15    131<L>  |  97  |  28<H>  ----------------------------<  91  3.0<L>   |  23  |  5.21<H>    Ca    7.8<L>      15 Feb 2022 10:52  Phos  4.0     02-15    TPro  x   /  Alb  1.6<L>  /  TBili  x   /  DBili  x   /  AST  x   /  ALT  x   /  AlkPhos  x   02-15        Vancomycin Level, Trough: 19.9 ug/mL (02-10 @ 16:43)  Vancomycin Level, Trough: 21.2 ug/mL (02-09 @ 13:51)    Culture - Blood (02.12.22 @ 21:59)   Specimen Source: .Blood None   Culture Results:   No growth to date.   Culture - Blood (02.12.22 @ 21:59)   Specimen Source: .Blood None   Culture Results:   No growth to date.   Culture - Blood (02.11.22 @ 08:08)   Gram Stain:   Growth in aerobic bottle: Yeast like cells   - Candida albicans: Detec   Culture - Blood (collected 08 Feb 2022 09:22)  Source: .Blood None  Preliminary Report (09 Feb 2022 15:05):    No growth to date.    Culture - Blood (collected 08 Feb 2022 09:22)  Source: .Blood None  Preliminary Report (09 Feb 2022 15:05):    No growth to date.    Culture - Blood (collected 06 Feb 2022 11:51)  Source: .Blood None  Final Report (11 Feb 2022 19:00):    No Growth Final    Culture - Blood (collected 06 Feb 2022 11:51)  Source: .Blood None  Final Report (11 Feb 2022 19:00):    No Growth Final    Culture - Blood (collected 04 Feb 2022 14:06)  Source: .Blood Blood-Peripheral  Gram Stain (05 Feb 2022 10:52):    Growth in aerobic bottle: Gram Positive Cocci in Clusters  Final Report (07 Feb 2022 07:37):    Growth in aerobic bottle: Methicillin Resistant Staphylococcus aureus  Organism: Blood Culture PCR  Methicillin resistant Staphylococcus aureus (07 Feb 2022 07:37)  Organism: Methicillin resistant Staphylococcus aureus (07 Feb 2022 07:37)      -  Ampicillin/Sulbactam: R 16/8      -  Cefazolin: R 16      -  Clindamycin: S <=0.25      -  Daptomycin: S 0.5      -  Erythromycin: R >4      -  Gentamicin: S <=1 Should not be used as monotherapy      -  Linezolid: S 2      -  Oxacillin: R >2      -  Penicillin: R >8      -  Rifampin: S <=1 Should not be used as monotherapy      -  Tetra/Doxy: S <=1      -  Trimethoprim/Sulfamethoxazole: S <=0.5/9.5      -  Vancomycin: S 1      Method Type: KRYSTEN  Organism: Blood Culture PCR (07 Feb 2022 07:37)      -  Methicillin resistant Staphylococcus aureus (MRSA): Detec      Method Type: PCR        Radiology: all available radiological tests reviewed        Questionable mild hyperenhancement of the urethra. Correlate with   urinalysis.    Findings were discussed with Dr. Epperson at 4:37 pm on 2/4/2022.        PROCEDURE DATE:  02/04/2022          INTERPRETATION:  CLINICAL INFORMATION: Infected decubitus ulcer    COMPARISON: 1/21/2022, 1/18/2022.    CONTRAST/COMPLICATIONS:  IV Contrast: Omnipaque 350  90 cc administered   10 cc discarded  Oral Contrast: NONE  Complications: None reported at time of study completion    PROCEDURE:  CT of the Abdomen and Pelvis was performed.  Sagittal and coronal reformats were performed.    FINDINGS:  LOWER CHEST: Within normal limits.    LIVER: There is thrombus surrounding the catheter tip in the intrahepatic   IVC.  BILE DUCTS: Normal caliber.  GALLBLADDER: Cholecystectomy.  SPLEEN: Borderline enlarged.  PANCREAS: Within normal limits.  ADRENALS: Within normal limits.  KIDNEYS/URETERS: Within normal limits.    BLADDER: Within normal limits. Question mild hyperenhancement of the   urethra.  REPRODUCTIVE ORGANS: Uterus and adnexa within normal limits.    BOWEL: No bowel obstruction. Large amount of fluid/stool throughout the   colon. Question mild thickening versus underdistention of the sigmoid   colon. Appendix is not visualized. No evidence of inflammation in the   pericecal region.  PERITONEUM: No ascites.  VESSELS: Within the intrahepatic IVC, as described above.  RETROPERITONEUM/LYMPH NODES: Multiple enlarged retroperitoneal nodes. . A   left para-aortic node measures 1.9 x 1.4 cm (2:54). A left common iliac   node measures 2.4 x 1.3 cm (2:75)  ABDOMINAL WALL: Left sacral decubitus ulcer without definite evidence of   osseous erosion.  BONES: Degenerative changes. Degenerative changes of the right hip.    IMPRESSION:  There is thrombus surrounding the catheter tip in the intrahepatic IVC.    Retroperitoneal adenopathy, as described above. Differential includes   lymphoma.    Mildly distended, fluid-filled colon. Mild thickening versus   underdistention of the sigmoid colon.    Left sacral decubitus ulcer without definite osseous erosion. Correlate   with MRI if clinically warranted.    Additional findings as above.      Advanced directives addressed: full resuscitation

## 2022-02-16 NOTE — CONSULT NOTE ADULT - ASSESSMENT
42 yo female w/PMH of ESRD (M/W/F), GERD, bacteremia, seizures, epilepsy, depression, COPD, HTN, heroin abuse, RA, Cdiff colitis in Dec 2021 smoker, recent admission for Aspiration Pna who  presents to [place]  ED via ambulance from dialysis center for brief loss of consciousness, questionable seizure vs syncope.        ESRD  -HD today, only partial tx yesterday. Keep TTS HD for now  -K protocol  -UF as tolerated    Hyponatremia  -UF with HD, limit free water intake    HTN  -0ral meds    d/c with RN staff, [facility] staff
40 yo female w/ PMH of ESRD (M/W/F), GERD, bacteremia, seizures, epilepsy, depression, COPD, HTN, heroin abuse, RA, C diff colitis in Dec 2021 smoker, recent admission for Aspiration Pna who presents to [place]  ED via ambulance from dialysis center for brief loss of consciousness, questionable seizure vs syncope.   CT AP:  Multiple enlarged retroperitoneal nodes. . A   left para-aortic node measures 1.9 x 1.4 cm (2:54). A left common iliac node measures 2.4 x 1.3 cm.  She is symptomatic with fever, night sweats and chills x 1 day.      A/P:  -differential diagnosis of retroperitoneal lymphadenopathy includes other causes of lymphadenopathy, including infectious, autoimmune, benign, lymphoma and other malignant disorders.  clinical presentation of non-Hodgkin lymphoma (NHL) varies depending on the subtype of lymphoma and areas of involvement. -common presentation of lymphoma includes lymphadenopathy, hepatosplenomegaly, fever, weight loss, and night sweats. Less common presentations include extranodal involvement, abnormal laboratory findings, and paraneoplastic syndromes.  -Lymph nodes were evident on prior imaging since 07/20:  01/22 CT:  There are confluent nodular structures in the left retroperitoneum and left iliac chain which is presumably lymphadenopathy measuring up to 1.5 cmin short axis; however, on the noncontrast study, large c a liber vessel/varices are not excluded.  1/18 CT:  enlarged left para-aortic lymph nodes measuring up to 2.2 x 1.7 cm, unchanged from 10/21  10/12/21 CT:   Ill-defined left para-aortic soft tissue may represent lymphadenopathy. This is very poorly evaluated on this exam. Mildly enlarged bilateral pelvic and inguinal lymph nodes.  7/23/20 CT:  LEFT para-aortic soft tissue fullness suggests lymphadenopathy    -check CBC, ESR, LDH  -consult IR for tissue biopsy for diagnosis and to determine the histologic subtype.  Lymph node excision, incisional biopsy, or multiple core needle biopsies needed for histology, immunophenotyping and cytogenetics
42 yo F with ESRD on HD, presents with bacteremia s/p RIJ PC removal, in need of HD access.    Plan:  -vein mapping results reviewed  -f/u blood cultures  -ID on board, recs appreciated  -c/w IV abx  -nephro on board, recs appreciated  -will need temporary HD access in the interim  -will plan for a right AVF creation vs right AVG with biologic on this admission  -continue to save right arm - no BP cuffs, IVs or blood draws  -medical management as per primary team    Plan discussed with Dr. Lynch
42yo F with ESRD referred to IR for placement of tunneled dialysis catheter  - Plan for tunneled dialysis catheter placement tomorrow if blood culture from 2/12 remains with no growth  - Keep pt NPO after midnight  - Repeat Covid PCR today  - Draw AM labs (CBC, CMP, coags)  - Hold eliquis 
40 yo female w/PMH of ESRD (M/W/F), GERD, bacteremia, seizures, epilepsy, depression, COPD, HTN, heroin abuse, RA, Cdiff colitis in Dec 2021 smoker, recent admission for Aspiration Pna who  presents to [place]  ED via ambulance from dialysis center for brief loss of consciousness, questionable seizure vs syncope.   EMS reported she had a 15 sec generalized seizure and was AAOx4 afterwards.  Pt states she was having shivering and not having a seizure.  She reported that her normal seizures involve her  whole body, unlike today.    She c/o  night sweats last night and chills this morning.  Day of admit she had  1.5 hours of hemodialysis instead of her usual HD.   Pt denies cpain/SOB, no cough or resp complaints, no  abd pain.  She reported having a loose stool and vomiting x1  and two episodes day prior to admit. She has urine once a day and denies dysuria. Here noted with MRSA in blood cx. Has perm-catheter in place. Hx of substance abuse.    1. MRSA sepsis/bacteremia. source ? catheter ? infected sacral decub wound?  hx substance abuse. ESRD  - s/p vancomycin x 1 2/5 level 27 - hold further vancomycin and give dose post HD tomorrow   - plan for catheter removal tomorrow  - repeat blood cx after catheter removed tomorrow  - dose vancomycin by level tomorrow then post hd  - TTE  - wound care eval for sacral decub  - monitor temps  - tolerating abx well so far; no side effects noted  - reason for abx use and side effects reviewed with patient  - supportive care  - fu cbc    2. other issues - care per medicine 
Pt is a 41y old Female with hx of ESRD (M/W/F), GERD, bacteremia, seizures, epilepsy, depression, COPD, HTN, heroin abuse, RA, Cdiff colitis in Dec 2021 smoker, recent admission for Aspiration Pna who  presents to [place]  ED via ambulance from dialysis center for brief loss of consciousness, questionable seizure vs syncope.   EMS reported she had a 15 sec generalized seizure and was AAOx4 afterwards.  Pt states she was having shivering and not having a seizure.  She reported that her normal seizures involve her  whole body, unlike today.    She c/o  night sweats last night and chills this morning.  Today she had  1.5 hours of hemodialysis instead of her usual HD.   Pt denies cpain/SOB, no cough or resp complaints, no  abd pain.  She reported having a loose stool and vomiting x1 today and two episodes yesterday.  She has urine once a day and denies dysuria. Palliative medicine consulted to help establish goc and advance care planning    1) Bacteremia   - had cultures positive for gram positive cocci in clusters  - repeat blood cultures   - ID consult appreciated   - c/w vanco as per ID   - plan for catheter removal     2) Sacral wound   - severe protein calorie malnutrition   - wound care nurse consult   - Albumin, Serum: 2.4 g/dL  - turn and position   - encourage OOB     3) ESRD   - HD patient   - nephrology consult appreciated   - avoid nephrotic medications   - now with clot near IVC    4) history of anemia  - Hb stable  - continue Eliquis  - monitor labs   - oncology consult appreciated     5) Retroperitoneal Adenopathy   - CT abdomen Pelvis Retroperitoneal adenopathy, as described above. Differential includes   lymphoma.  - ?possible IR biopsy   - oncology consult appreciated     5) Advance care planning   - patient has capacity to make decisions   - would like to have all interventions   - HCP on file naming Lukas Mane (pts ex ) who patient states is ok to be involved in decision making and patients aunt Gregoria patient also requesting for her brother Blake to be involved, is considering filling out a new HCP reflecting her brother be the decision maker if patient is no longer able to

## 2022-02-16 NOTE — PROGRESS NOTE ADULT - ASSESSMENT
40 yo female w/PMH of ESRD (M/W/F), GERD, bacteremia, seizures, epilepsy, depression, COPD, HTN, heroin abuse, RA, Cdiff colitis in Dec 2021 smoker, recent admission for Aspiration Pna who  presents to [place]  ED via ambulance from dialysis center for brief loss of consciousness, questionable seizure vs syncope.        ESRD on HD TTS   MRSA bacteremia w persistent fevers - now Candidemia on 2/11 ( same day catheter was inserted )   Fever today   d/w ID will need to remove catheter - plan for this after HD 2/17 - IR to remove this tomorrow 2/17   line holiday  UF as tolerated      Will need perm access - Vascular following for access eventual Right AVF or AVG when stable      HTN  - oral meds      Bacteremia /fungemia   HIV status negative   ??  GRAHAM if persistent bacteremia/fungemia?   await ID input     d/w Dr Wilkinson and Dr Ugalde earlier today  40 yo female w/PMH of ESRD (M/W/F), GERD, bacteremia, seizures, epilepsy, depression, COPD, HTN, heroin abuse, RA, Cdiff colitis in Dec 2021 smoker, recent admission for Aspiration Pna who  presents to [place]  ED via ambulance from dialysis center for brief loss of consciousness, questionable seizure vs syncope.        ESRD on HD TTS   MRSA bacteremia w persistent fevers - now Candidemia on 2/11 ( same day catheter was inserted )   Fever today   d/w ID will need to remove catheter - plan for this after HD 2/17 - IR to remove this tomorrow 2/17   line holiday  UF as tolerated      Will need perm access - Vascular following for access eventual Right AVF or AVG when stable      HTN  - oral meds    Anemia   TAMARA at HD and once holding HD would give SC TIW   no obvios blood loss   no IV iron due to bacteremia   no obvious blood loss       Bacteremia /fungemia   HIV status negative   ??  GRAHAM if persistent bacteremia/fungemia?   await ID input     d/w Dr Wilkinson and Dr Ugalde earlier today

## 2022-02-16 NOTE — PROGRESS NOTE ADULT - SUBJECTIVE AND OBJECTIVE BOX
Patient was seen and evaluated at bedside. Patient bactremic and fungemic. Offers no new complaints. VS reviewed.    Vital Signs Last 24 Hrs  T(C): 37.2 (16 Feb 2022 08:53), Max: 37.2 (16 Feb 2022 08:53)  T(F): 98.9 (16 Feb 2022 08:53), Max: 98.9 (16 Feb 2022 08:53)  HR: 105 (16 Feb 2022 08:53) (84 - 106)  BP: 142/88 (16 Feb 2022 08:53) (125/87 - 164/105)  BP(mean): --  RR: 18 (16 Feb 2022 08:53) (18 - 20)  SpO2: 100% (16 Feb 2022 08:53) (96% - 100%)    Physical Exam:  General: AAOx3, in NAD  HEENT: NC/AT, EOMI, poor dentition  Cardio: S1S2, tachycardic  Pulm: equal air entry b/l, non labored  Abdomen: soft, NT/ND  Extremities: multiple excoriations over all extremities      .  LABS:                                    8.5    8.35  )-----------( 256      ( 16 Feb 2022 07:45 )             26.3   02-15    131<L>  |  97  |  28<H>  ----------------------------<  91  3.0<L>   |  23  |  5.21<H>    Ca    7.8<L>      15 Feb 2022 10:52  Phos  4.0     02-15    TPro  x   /  Alb  1.6<L>  /  TBili  x   /  DBili  x   /  AST  x   /  ALT  x   /  AlkPhos  x   02-15                  RADIOLOGY, EKG & ADDITIONAL TESTS: Reviewed.

## 2022-02-16 NOTE — PROGRESS NOTE ADULT - ASSESSMENT
41 year-old woman with ESRD on HD, rheumatoid arthritis, Raynaud's, tobacco and IV drug use disorder, admitted for episode of loss of consciousness, sepsis with organ dysfunction, and dialysis catheter tip thrombus, found to have MRSA bacteremia, since found to also have C.abicans on blood cultures taken when she was noted to have persisting fever despite removal of permcath and several doses of IV vancomycin.       Sepsis with organ dysfunction (elevated lactate) due to MRSA bacteremia, present upon admission, unable to determine if PC source of her IV drug use disorder as source, now C.albicans in blood culture as well   In setting of IV drug use disorder. MRSA on blood culture 2/4. Dialysis catheter removed 2/7. R IJ Shiley since placed. Has been on IV vancomycin post dialysis but patient was noted to have persisting high fevers, found to have C.albicans on blood culture 2/11. TTE negative for vegetations. ID following. On Vancomycin with HD. Started on Caspofungin 2/12, day 4 today. Fever curve improved. Lactic acidosis resolved. Sacral decubitus wound clean, does not appear to be source of sepsis.    - Continue vancomycin post dialysis, next due today  - Continue caspofungin, day 4  - F/u repeat cultures, so far no growth to date  - Monitor for fever, maintain MAP > 65  - Opthalmology eval in process of being requested, awaiting call back from specialist    Sinus tachycardia  In setting of acute infection.   - Continue to manage sepsis    Dialysis catheter tip thrombus  Catheter since removed due to MRSA bacteremia and sepsis. On Eliquis.  - Continue Eliquis    ESRD on HD  Receiving dialysis sessions as per Nephrology, currently via temporary HD catheter as patients permcath was removed this admission on 2/7 in setting of sepsis, bacteremia. Will need more permanent HD access once clinically improved and bacteremia / fungemia cleared. Vascular Surgery following.   - Continue dialysis sessions  - Begin planning for new permcath placement by IR as repeat cultures remain negative to date, possibly late this week  - Continue to save R arm (no BP cuffs, IVs or blood draws) and will plan for AVF creation when patient's acute infection resolves    CKD anemia  May also be further compounded by septicemia and fungemia. S/P PRBC transfusion with HD 2/12  - Continue epo    Renal bone disease  -Continue sevelamer    Hyponatremia, hypokalemia  Etiology unclear. Will discuss with Nephrology re management.     Loose stools  No hematochezia or melena. No abdominal tenderness. FOBT negative. Cdiff PCR negative. CT with possible mild wall thickening at rectosigmoid junction.   - Will continue to monitor    Hx of seizure  - On Keppra    Rheumatoid arthritis  - Continue Plaquenil    Abnormal imaging of the breast  As noted on CT chest 2/12. Asymmetric right breast density with nonspecific soft tissue infiltration extending to right chest wall.   - Patient will need non-emergent breast imaging correlation to exclude underlying malignancy including inflammatory carcinoma.    Retroperitoneal lymphadenopathy with a cluster of multiple enlarged left paraaortic and pelvic lymph nodes  As noted on CT abd/pelvis 2/12 and imaging prior. Rather stable in size.   - Patient will need continued follow up regarding these findings    Severe protein calorie malnutrition  Appreciate input from Nutrition  - Encourage po intake, trend weight      Diet: Renal  DVT px: Eliquis  Code Status: Full  Dispo: To be determined      41 year-old woman with ESRD on HD, rheumatoid arthritis, Raynaud's, tobacco and IV drug use disorder, admitted for episode of loss of consciousness, sepsis with organ dysfunction, and dialysis catheter tip thrombus, found to have MRSA bacteremia, since found to also have C.abicans and C.tropicalis on blood cultures taken when she was noted to have persisting fever despite removal of permcath and several doses of IV vancomycin. Caspofungin added and patient now clinically improving.     Sepsis with organ dysfunction (elevated lactate) due to MRSA bacteremia, present upon admission, unable to determine if PC source of her IV drug use disorder as source, now C.albicans and C.tropicalis in blood culture as well   In setting of IV drug use disorder. MRSA on blood culture 2/4. Dialysis catheter removed 2/7. R MANINDER Reddy since placed. Has been on IV vancomycin post dialysis but patient was noted to have persisting high fevers, found to have C.albicans and C.tropicalis on blood culture 2/11. TTE negative for vegetations. ID following. On Vancomycin with HD. Started on IV caspofungin 2/12, day 5 today. Fever curve improved. Lactic acidosis resolved. Repeat cultures 2/12 and 2/15 no growth to date. Sacral decubitus wound clean, does not appear to be source of sepsis. Patient seen by ID today, switched IV caspofungin to PO Diflucan.    - Continue IV vancomycin post dialysis, eventually planned to complete 3/5  - Continue PO Diflucan 200mg daily until 2/26  - Monitor for fever, maintain MAP > 65  - Opthalmology eval in process of being requested, awaiting call back from specialist    Sinus tachycardia  In setting of acute infection, though patient states she also has baseline sinus tachycardia.   - Continue to monitor as needed    Dialysis catheter tip thrombus  Catheter since removed due to MRSA bacteremia and sepsis. On Eliquis.  - Continue Eliquis but held today for possible permcath placement tomorrow 2/17    ESRD on HD  Receiving dialysis sessions as per Nephrology, currently via temporary HD catheter as patients permcath was removed this admission on 2/7 in setting of sepsis, bacteremia. Will need more permanent HD access once clinically improved and bacteremia / fungemia cleared. Vascular Surgery following.   - Continue dialysis sessions  - Anticipate possible permcath placement tomorrow by IR, pre-procedure labs ordered, Eliquis held, NPO except meds after midnight  - Continue to save R arm (no BP cuffs, IVs or blood draws) and will plan for eventual AVF creation    CKD anemia  May also be further compounded by septicemia and fungemia. S/P PRBC transfusion with HD 2/12  - Continue epo    Renal bone disease  - Continue sevelamer    Hyponatremia, hypokalemia  - Nephrology to assist with management    Hx of seizure  Stable  - On Keppra    Rheumatoid arthritis  Stable  - Continue Plaquenil    Abnormal imaging of the breast  As noted on CT chest 2/12. Asymmetric right breast density with nonspecific soft tissue infiltration extending to right chest wall.   - Patient will need non-emergent breast imaging correlation to exclude underlying malignancy including inflammatory carcinoma.    Retroperitoneal lymphadenopathy with a cluster of multiple enlarged left paraaortic and pelvic lymph nodes  As noted on CT abd/pelvis 2/12 and imaging prior. Rather stable in size.   - Patient will need continued follow up regarding these findings    Severe protein calorie malnutrition  Appreciate input from Nutrition  - Encourage po intake, trend weight      RESOLVED HOSPITAL PROBLEMS    Loose stools  Resolved. No hematochezia or melena. No abdominal tenderness. FOBT negative. Cdiff PCR negative. CT with possible mild wall thickening at rectosigmoid junction. Resolved without intervention.         Diet: Renal, NPO except meds after midnight  DVT px: Eliquis, though held for procedure tomorrow  Code Status: Full  Dispo: To be determined

## 2022-02-16 NOTE — PROGRESS NOTE ADULT - ASSESSMENT
40 yo female w/PMH of ESRD (M/W/F), GERD, bacteremia, seizures, epilepsy, depression, COPD, HTN, heroin abuse, RA, Cdiff colitis in Dec 2021 smoker, recent admission for Aspiration Pna who  presents to [place]  ED via ambulance from dialysis center for brief loss of consciousness, questionable seizure vs syncope.   EMS reported she had a 15 sec generalized seizure and was AAOx4 afterwards. Pt states she was having shivering and not having a seizure.  She reported that her normal seizures involve her  whole body, unlike day of admit.  She c/o  night sweats last night and chills this morning. Day of admit she had  1.5 hours of hemodialysis instead of her usual HD.   Pt denies cpain/SOB, no cough or resp complaints, no  abd pain.  She reported having a loose stool and vomiting x1 and two episodes day prior to admit. She has urine once a day and denies dysuria. Here noted with MRSA in blood cx. Has perm-catheter in place. Hx of substance abuse.    1. Fungemia. Possible disseminated candidemia. MRSA sepsis/bacteremia. Catheter-related infection. IVC thrombus. hx substance abuse. ESRD  - c albicans growing in blood cx from 2/11   - s/p perm-catheter removal 2/7 and shiley catheter placed  - on vancomycin 500mg post HD #12   - s/p caspofungin 50mg daily #4 - change to diflucan 400mg x 1 then 200mg daily   - repeat blood cx 2/6, 2/8, 2/12 no growth so far  - TTE no obvious vegetations   - wound care eval for sacral decub - wound clean  - continue with antibiotics/antifungals  - case d/w nephrology, 2/12 blood cx no growth, if no growth x 5 days, can proceed with tessio line placement on 2/18  - opthalmology eval   - monitor temps  - tolerating abx well so far; no side effects noted  - reason for abx use and side effects reviewed with patient  - eventual plan for vancomycin 500mg post HD until 3/5 and po diflucan 200mg daily until 3/11; 4 week course  - supportive care  - fu cbc    2. other issues - care per medicine  40 yo female w/PMH of ESRD (M/W/F), GERD, bacteremia, seizures, epilepsy, depression, COPD, HTN, heroin abuse, RA, Cdiff colitis in Dec 2021 smoker, recent admission for Aspiration Pna who  presents to [place]  ED via ambulance from dialysis center for brief loss of consciousness, questionable seizure vs syncope.   EMS reported she had a 15 sec generalized seizure and was AAOx4 afterwards. Pt states she was having shivering and not having a seizure.  She reported that her normal seizures involve her  whole body, unlike day of admit.  She c/o  night sweats last night and chills this morning. Day of admit she had  1.5 hours of hemodialysis instead of her usual HD.   Pt denies cpain/SOB, no cough or resp complaints, no  abd pain.  She reported having a loose stool and vomiting x1 and two episodes day prior to admit. She has urine once a day and denies dysuria. Here noted with MRSA in blood cx. Has perm-catheter in place. Hx of substance abuse.    1. Fungemia. Possible disseminated candidemia. MRSA sepsis/bacteremia. Catheter-related infection. IVC thrombus. hx substance abuse. ESRD  - c albicans growing in blood cx from 2/11   - s/p perm-catheter removal 2/7 and shiley catheter placed  - on vancomycin 500mg post HD #12   - s/p caspofungin 50mg daily #4 - change to diflucan 400mg x 1 then 200mg daily   - repeat blood cx 2/6, 2/8, 2/12 no growth so far  - TTE no obvious vegetations   - wound care eval for sacral decub - wound clean  - continue with antibiotics/antifungals  - case d/w nephrology, 2/12 blood cx no growth, if no growth x 5 days, can proceed with tessio line placement on 2/17  - opthalmology eval   - monitor temps  - tolerating abx well so far; no side effects noted  - reason for abx use and side effects reviewed with patient  - eventual plan for vancomycin 500mg post HD until 3/5 and po diflucan 200mg daily until 3/11; 4 week course  - supportive care  - fu cbc    2. other issues - care per medicine  42 yo female w/PMH of ESRD (M/W/F), GERD, bacteremia, seizures, epilepsy, depression, COPD, HTN, heroin abuse, RA, Cdiff colitis in Dec 2021 smoker, recent admission for Aspiration Pna who  presents to [place]  ED via ambulance from dialysis center for brief loss of consciousness, questionable seizure vs syncope.   EMS reported she had a 15 sec generalized seizure and was AAOx4 afterwards. Pt states she was having shivering and not having a seizure.  She reported that her normal seizures involve her  whole body, unlike day of admit.  She c/o  night sweats last night and chills this morning. Day of admit she had  1.5 hours of hemodialysis instead of her usual HD.   Pt denies cpain/SOB, no cough or resp complaints, no  abd pain.  She reported having a loose stool and vomiting x1 and two episodes day prior to admit. She has urine once a day and denies dysuria. Here noted with MRSA in blood cx. Has perm-catheter in place. Hx of substance abuse.    1. Fungemia. Possible disseminated candidemia. MRSA sepsis/bacteremia. Catheter-related infection. IVC thrombus. hx substance abuse. ESRD  - c albicans, c tropicalis growing in blood cx from 2/11   - s/p perm-catheter removal 2/7 and shiley catheter placed  - on vancomycin 500mg post HD #12   - s/p caspofungin 50mg daily #4 - change to diflucan 400mg x 1 then 200mg daily   - repeat blood cx 2/6, 2/8, 2/12 no growth so far  - TTE no obvious vegetations   - wound care eval for sacral decub - wound clean  - continue with antibiotics/antifungals  - case d/w nephrology, 2/12 blood cx no growth, if no growth x 5 days, can proceed with tessio line placement on 2/17  - opthalmology eval   - monitor temps  - tolerating abx well so far; no side effects noted  - reason for abx use and side effects reviewed with patient  - eventual plan for vancomycin 500mg post HD until 3/5 and po diflucan 200mg daily until 2/26  - supportive care  - fu cbc    2. other issues - care per medicine

## 2022-02-16 NOTE — CONSULT NOTE ADULT - CONSULT REASON
GOC
mrsa bacteremia   seizure   catheter infection?
ESRD/Hyponatremia
fever
r/o lymphoma
40yo F with ESRD referred to IR for placement of tunneled dialysis catheter
HD access

## 2022-02-16 NOTE — CONSULT NOTE ADULT - REASON FOR ADMISSION
Fever  Seizure  Sacral Decubitus  IVC Tip Thrombus  Abn CT Ab

## 2022-02-16 NOTE — CONSULT NOTE ADULT - CONSULT REQUESTED DATE/TIME
16-Feb-2022 11:11
05-Feb-2022 11:40
12-Feb-2022 15:39
05-Feb-2022
06-Feb-2022 12:30
07-Feb-2022 17:39
07-Feb-2022 10:21

## 2022-02-17 LAB
-  AMPHOTERICIN B: SIGNIFICANT CHANGE UP
-  ANIDULAFUNGIN: SIGNIFICANT CHANGE UP
-  CASPOFUNGIN: SIGNIFICANT CHANGE UP
-  FLUCONAZOLE: SIGNIFICANT CHANGE UP
-  INTERPRETATION: SIGNIFICANT CHANGE UP
-  MICAFUNGIN: SIGNIFICANT CHANGE UP
-  POSACONAZOLE: SIGNIFICANT CHANGE UP
-  VORICONAZOLE: SIGNIFICANT CHANGE UP
ANION GAP SERPL CALC-SCNC: 10 MMOL/L — SIGNIFICANT CHANGE UP (ref 5–17)
APTT BLD: 27.4 SEC — LOW (ref 27.5–35.5)
BUN SERPL-MCNC: 19 MG/DL — SIGNIFICANT CHANGE UP (ref 7–23)
CALCIUM SERPL-MCNC: 7.8 MG/DL — LOW (ref 8.5–10.1)
CHLORIDE SERPL-SCNC: 97 MMOL/L — SIGNIFICANT CHANGE UP (ref 96–108)
CO2 SERPL-SCNC: 23 MMOL/L — SIGNIFICANT CHANGE UP (ref 22–31)
CREAT SERPL-MCNC: 4.33 MG/DL — HIGH (ref 0.5–1.3)
CULTURE RESULTS: SIGNIFICANT CHANGE UP
GLUCOSE SERPL-MCNC: 88 MG/DL — SIGNIFICANT CHANGE UP (ref 70–99)
HCT VFR BLD CALC: 22.6 % — LOW (ref 34.5–45)
HGB BLD-MCNC: 7.3 G/DL — LOW (ref 11.5–15.5)
INR BLD: 1.4 RATIO — HIGH (ref 0.88–1.16)
MCHC RBC-ENTMCNC: 29 PG — SIGNIFICANT CHANGE UP (ref 27–34)
MCHC RBC-ENTMCNC: 32.3 GM/DL — SIGNIFICANT CHANGE UP (ref 32–36)
MCV RBC AUTO: 89.7 FL — SIGNIFICANT CHANGE UP (ref 80–100)
METHOD TYPE: SIGNIFICANT CHANGE UP
ORGANISM # SPEC MICROSCOPIC CNT: SIGNIFICANT CHANGE UP
PLATELET # BLD AUTO: 269 K/UL — SIGNIFICANT CHANGE UP (ref 150–400)
POTASSIUM SERPL-MCNC: 3.2 MMOL/L — LOW (ref 3.5–5.3)
POTASSIUM SERPL-SCNC: 3.2 MMOL/L — LOW (ref 3.5–5.3)
PROTHROM AB SERPL-ACNC: 16 SEC — HIGH (ref 10.6–13.6)
RBC # BLD: 2.52 M/UL — LOW (ref 3.8–5.2)
RBC # FLD: 15.6 % — HIGH (ref 10.3–14.5)
SODIUM SERPL-SCNC: 130 MMOL/L — LOW (ref 135–145)
SPECIMEN SOURCE: SIGNIFICANT CHANGE UP
WBC # BLD: 9.86 K/UL — SIGNIFICANT CHANGE UP (ref 3.8–10.5)
WBC # FLD AUTO: 9.86 K/UL — SIGNIFICANT CHANGE UP (ref 3.8–10.5)

## 2022-02-17 PROCEDURE — 99232 SBSQ HOSP IP/OBS MODERATE 35: CPT

## 2022-02-17 RX ORDER — ONDANSETRON 8 MG/1
4 TABLET, FILM COATED ORAL ONCE
Refills: 0 | Status: COMPLETED | OUTPATIENT
Start: 2022-02-17 | End: 2022-02-17

## 2022-02-17 RX ORDER — OXYCODONE AND ACETAMINOPHEN 5; 325 MG/1; MG/1
1 TABLET ORAL EVERY 4 HOURS
Refills: 0 | Status: DISCONTINUED | OUTPATIENT
Start: 2022-02-17 | End: 2022-02-18

## 2022-02-17 RX ADMIN — Medication 1 APPLICATION(S): at 12:01

## 2022-02-17 RX ADMIN — ONDANSETRON 4 MILLIGRAM(S): 8 TABLET, FILM COATED ORAL at 17:00

## 2022-02-17 RX ADMIN — OXYCODONE AND ACETAMINOPHEN 1 TABLET(S): 5; 325 TABLET ORAL at 03:37

## 2022-02-17 RX ADMIN — Medication 3 MILLIGRAM(S): at 20:23

## 2022-02-17 RX ADMIN — OXYCODONE AND ACETAMINOPHEN 1 TABLET(S): 5; 325 TABLET ORAL at 20:22

## 2022-02-17 RX ADMIN — Medication 12.5 MILLIGRAM(S): at 12:02

## 2022-02-17 RX ADMIN — Medication 100 MILLIGRAM(S): at 17:38

## 2022-02-17 RX ADMIN — PANTOPRAZOLE SODIUM 40 MILLIGRAM(S): 20 TABLET, DELAYED RELEASE ORAL at 06:10

## 2022-02-17 RX ADMIN — ERYTHROPOIETIN 10000 UNIT(S): 10000 INJECTION, SOLUTION INTRAVENOUS; SUBCUTANEOUS at 10:06

## 2022-02-17 RX ADMIN — SEVELAMER CARBONATE 800 MILLIGRAM(S): 2400 POWDER, FOR SUSPENSION ORAL at 11:53

## 2022-02-17 RX ADMIN — OXYCODONE AND ACETAMINOPHEN 1 TABLET(S): 5; 325 TABLET ORAL at 11:52

## 2022-02-17 RX ADMIN — FLUCONAZOLE 100 MILLIGRAM(S): 150 TABLET ORAL at 14:01

## 2022-02-17 RX ADMIN — Medication 650 MILLIGRAM(S): at 14:01

## 2022-02-17 RX ADMIN — Medication 1 DROP(S): at 12:03

## 2022-02-17 RX ADMIN — OXYCODONE AND ACETAMINOPHEN 1 TABLET(S): 5; 325 TABLET ORAL at 02:57

## 2022-02-17 RX ADMIN — Medication 200 MILLIGRAM(S): at 11:53

## 2022-02-17 RX ADMIN — CHLORHEXIDINE GLUCONATE 1 APPLICATION(S): 213 SOLUTION TOPICAL at 06:02

## 2022-02-17 RX ADMIN — GABAPENTIN 100 MILLIGRAM(S): 400 CAPSULE ORAL at 20:22

## 2022-02-17 RX ADMIN — SEVELAMER CARBONATE 800 MILLIGRAM(S): 2400 POWDER, FOR SUSPENSION ORAL at 17:00

## 2022-02-17 RX ADMIN — Medication 12.5 MILLIGRAM(S): at 20:22

## 2022-02-17 RX ADMIN — LEVETIRACETAM 500 MILLIGRAM(S): 250 TABLET, FILM COATED ORAL at 11:55

## 2022-02-17 NOTE — PROGRESS NOTE ADULT - ASSESSMENT
41 year-old woman with ESRD on HD, rheumatoid arthritis, Raynaud's, tobacco and IV drug use disorder, admitted for episode of loss of consciousness, sepsis with organ dysfunction, and dialysis catheter tip thrombus, found to have MRSA bacteremia, since found to also have C.abicans and C.tropicalis on blood cultures taken when she was noted to have persisting fever despite removal of permcath and several doses of IV vancomycin. Caspofungin added and patient now clinically improving.     Sepsis with organ dysfunction (elevated lactate) due to MRSA bacteremia, present upon admission, unable to determine if PC source of her IV drug use disorder as source, now C.albicans and C.tropicalis in blood culture as well   In setting of IV drug use disorder. MRSA on blood culture 2/4. Dialysis catheter removed 2/7. R IJ Shiley since placed. Has been on IV vancomycin post dialysis but patient was noted to have persisting high fevers, found to have C.albicans and C.tropicalis on blood culture 2/11. TTE negative for vegetations. ID following. On Vancomycin with HD. Started on IV caspofungin 2/12, switched to PO Diflucan 2/16. Repeat cultures 2/12 and 2/15 no growth to date. However, patient still with intermittent fever. Recognize that Shiley needs to be removed.   After lengthy discussion with Nephrology and ID, given patient's past and current issues with vascular access, risk for stenosis / fibrosis to vessels with repeated line changes, difficulty getting labs without such access, decision made not to remove Shiley and instead draw blood cultures from Shiley after dialysis today.   - GRAHAM ordered to r/o subacute endocarditis.   - F/u Bld Cxs from [facility] 2/17  - Continue IV vancomycin post dialysis, eventually planned to complete 3/5  - Continue PO Diflucan 200mg daily until 2/26  - Monitor for fever, maintain MAP > 65  - Opthalmology eval in process of being requested, awaiting call back from specialist    Sinus tachycardia  In setting of acute infection, though patient states she also has baseline sinus tachycardia.   - Continue to monitor as needed    Dialysis catheter tip thrombus  Catheter since removed due to MRSA bacteremia and sepsis. On Eliquis.  - Continue Eliquis but held today for possible permcath placement when cleared to proceed    ESRD on HD  Receiving dialysis sessions as per Nephrology, currently via temporary HD catheter as patients permcath was removed this admission on 2/7 in setting of sepsis, bacteremia. Will need more permanent HD access once clinically improved and bacteremia / fungemia cleared. Vascular Surgery following.   - Continue dialysis sessions  - Anticipate possible permcath placement when cleared   - Continue to save R arm (no BP cuffs, IVs or blood draws) and will plan for eventual AVF creation    CKD anemia  May also be further compounded by septicemia and fungemia. S/P PRBC transfusion with HD 2/12. She is anemic again today but no overt bleeding or hemolysis.   - Ordered for1u PRBC with dialysis.   - Continue epo    Renal bone disease  - Continue sevelamer    Hyponatremia, hypokalemia  - Nephrology to assist with management    Stage II decubitus wound on coccyx  Stable, appears to be healing. No malodor. No purulence. No locoregional erythema or induration or fluctuance  - Continue wound care  - Turn frequently    Hx of seizure  Stable  - On Keppra    Rheumatoid arthritis  Stable  - Continue Plaquenil    Abnormal imaging of the breast  As noted on CT chest 2/12. Asymmetric right breast density with nonspecific soft tissue infiltration extending to right chest wall.   - Patient will need non-emergent breast imaging correlation to exclude underlying malignancy including inflammatory carcinoma.    Retroperitoneal lymphadenopathy with a cluster of multiple enlarged left paraaortic and pelvic lymph nodes  As noted on CT abd/pelvis 2/12 and imaging prior. Rather stable in size.   - Patient will need continued follow up regarding these findings    Severe protein calorie malnutrition  Appreciate input from Nutrition  - Encourage po intake, trend weight      RESOLVED HOSPITAL PROBLEMS    Loose stools  Resolved. No hematochezia or melena. No abdominal tenderness. FOBT negative. Cdiff PCR negative. CT with possible mild wall thickening at rectosigmoid junction. Resolved without intervention.         Diet: Renal  DVT px: Eliquis, though held for eventual PC placement  Code Status: Full  Dispo: To be determined

## 2022-02-17 NOTE — PROGRESS NOTE ADULT - ASSESSMENT
42 yo female w/PMH of ESRD (M/W/F), GERD, bacteremia, seizures, epilepsy, depression, COPD, HTN, heroin abuse, RA, Cdiff colitis in Dec 2021 smoker, recent admission for Aspiration Pna who  presents to [facility] via ambulance from dialysis center for brief loss of consciousness, questionable seizure vs syncope.   EMS reported she had a 15 sec generalized seizure and was AAOx4 afterwards. Pt states she was having shivering and not having a seizure.  She reported that her normal seizures involve her  whole body, unlike day of admit.  She c/o  night sweats last night and chills this morning. Day of admit she had  1.5 hours of hemodialysis instead of her usual HD.   Pt denies cpain/SOB, no cough or resp complaints, no  abd pain.  She reported having a loose stool and vomiting x1 and two episodes day prior to admit. She has urine once a day and denies dysuria. Here noted with MRSA in blood cx. Has perm-catheter in place. Hx of substance abuse.    1. Fungemia. Possible disseminated candidemia. MRSA sepsis/bacteremia. Catheter-related infection. IVC thrombus. hx substance abuse. ESRD  - c albicans, c tropicalis growing in blood cx from 2/11   - s/p perm-catheter removal 2/7 and shiley catheter placed  - on vancomycin 500mg post HD #13  - s/p caspofungin 50mg daily #4 - change to diflucan 400mg x 1 then 200mg daily   - repeat blood cx 2/6, 2/8, 2/12 no growth so far  - TTE no obvious vegetations   - wound care eval for sacral decub - wound clean  - continue with antibiotics/antifungals  - after further discussion, had one episode of fever, have decided to not remove shiley, will draw blood cultures from the shiley after dialysis  - opthalmology eval   - monitor temps  - tolerating abx well so far; no side effects noted  - iv access is so difficult, that is why this special situation was made with exemption to maintain the shiley and see if the blood is clear  - consideration for GRAHAM to rule out subacute endocarditis  - supportive care  - fu cbc  - d/w nephrology  2. other issues - care per medicine

## 2022-02-17 NOTE — PROGRESS NOTE ADULT - SUBJECTIVE AND OBJECTIVE BOX
Chief Complaint: Loss of consciousness    Interval History: Yesterday late afternoon patient again with fever. No new complaints at that time. No acute events overnight. Reports that she feels generally okay but is having intermittent fevers again. On vancomycin IV post dialysis for MRSA bacteremia, Diflucan for C.albicans and C.tropicalis fungemia. Repeat blood cultures from 2/12 and 2/15 so far with no growth to date. After lengthy discussion with Nephrology and ID, given patient's past and current issues with vascular access, risk for stenosis / fibrosis to vessels with repeated line changes, difficulty getting labs without such access, decision made not to remove Shiley and instead draw blood cultures from Shiley after dialysis today. GRAHAM ordered to r/o subacute endocarditis. She is anemic today but no overt bleeding or hemolysis. Ordered for1u PRBC with dialysis. Lastly, she continues to receive wound care to her sacral decubitus wound, appears to be improving, no sign of infection there.      ROS: Multi system review is comprehensively negative x 10 systems except for chronic severe physical deconditioning, sacral decubitus wound    Physical Exam:  T(F): 101.8 (17 Feb 2022 16:34), Max: 101.8 (17 Feb 2022 16:34)  HR: 107 (17 Feb 2022 11:35) (98 - 112)  BP: 137/99 (17 Feb 2022 11:35) (124/92 - 155/90)  RR: 18 (17 Feb 2022 11:35) (18 - 18)  SpO2: 98% (17 Feb 2022 06:21) (96% - 98%)    GEN: No acute distress  HEENT: NCAT PERRL EOMI  NECK: R IJ Shiley catheter in place, neck supple  CHEST: CTA b/l, no wheezes, rales or rhonchi  CVS: S1 S2 normal, tachycardic to 100  ABD: Soft, non tender, non distended, no rebound, no guarding  EXT: No calf tenderness, no erythema  SKIN: Warm, dry, stage IV sacral ulcer without locoregional tenderness, induration, fluctuance, no purulence  NEURO: Alert, awake, grossly without deficits    Labs:                     CBC 2/17                        7.3    9.86  )--------( 269              22.6     BMP 2/17    130  |  97  |  19  ---------------------<  88  3.2   |  23  |  4.33    Ca 7.8  Phos 4.0    TPro  6.1  /  Alb  1.8  /  TBili  0.3  /  DBili  x   /  AST  7  /  ALT  8  /  AlkPhos  76      Lactate 6.1 --> 0.9    Micro:  COVID19 PCR 2/15: Negative  Blood culture 2/15: No growth to date  COVID19 PCR 2/13: Negative  Cdiff PCR 2/12: Negative  Blood culture 2/12: No growth to date  Blood culture 2/11: C.albicans, C.tropicalis  COVID19 PCR 2/10: Negative  Blood culture 2/8: Negative  Blood culture 2/6: Negative  Blood culture 2/4: MRSA    Imaging:  CT chest 2/12: Near complete resolution of previously identified mucoid debris in bronchus intermedius and bilateral lower lobe bronchi with tree-in-bud nodularity, right greater than left. No consolidation or pleural effusion. Asymmetric right breast density with nonspecific soft tissue infiltration extending to right chest wall.    CT abd/pelvis 2/12: Rectal tube identified, tip at the level of the rectosigmoid junction containing contrast. Mild wall thickening of the rectosigmoid junction. No bowel obstruction. Reidentified bulky retroperitoneal lymphadenopathy with cluster of multiple enlarged left paraaortic and pelvic lymph nodes, stable compared to recent prior study.    Cardiac Testing:  Tele 2/16-17: Sinus tachycardia,     EKG 2/12: Sinus tachycardia,     TTE 2/8 : The left ventricle is normal in size. Moderately reduced left ventricular systolic function. Estimated left ventricular ejection fraction is 40%. Normal appearing right atrium. A catheter wire is seen in the right atrium. Normal aortic valve structure and function. Trivial aortic regurgitation is present. Normal appearing mitral valve structure and function. Mild (1+) mitral regurgitation is present.    Meds:  MEDICATIONS  (STANDING):  carboxymethylcellulose 0.5% Ophthalmic Solution 1 Drop(s) Both EYES two times a day  chlorhexidine 4% Liquid 1 Application(s) Topical <User Schedule>  collagenase Ointment 1 Application(s) Topical daily  epoetin amee-epbx (RETACRIT) Injectable 46115 Unit(s) IV Push <User Schedule>  fluconAZOLE IVPB 200 milliGRAM(s) IV Intermittent every 24 hours  gabapentin 100 milliGRAM(s) Oral three times a day  hydroxychloroquine 200 milliGRAM(s) Oral daily  levETIRAcetam 250 milliGRAM(s) Oral <User Schedule>  levETIRAcetam 500 milliGRAM(s) Oral daily  metoprolol tartrate 12.5 milliGRAM(s) Oral two times a day  pantoprazole    Tablet 40 milliGRAM(s) Oral before breakfast  sevelamer carbonate 800 milliGRAM(s) Oral three times a day with meals  vancomycin  IVPB 500 milliGRAM(s) IV Intermittent <User Schedule>    MEDICATIONS  (PRN):  acetaminophen     Tablet .. 650 milliGRAM(s) Oral every 6 hours PRN Temp greater or equal to 38.5C (101.3F), Mild Pain (1 - 3)  acetaminophen   IVPB .. 1000 milliGRAM(s) IV Intermittent once PRN Temp greater or equal to 38.5C (101.3F)  albumin human 25% IVPB 50 milliLiter(s) IV Intermittent every 1 hour PRN SBP less than 100  ALBUTerol    90 MICROgram(s) HFA Inhaler 2 Puff(s) Inhalation every 6 hours PRN Shortness of Breath and/or Wheezing  diphenhydrAMINE 25 milliGRAM(s) Oral every 6 hours PRN Rash and/or Itching  loperamide 2 milliGRAM(s) Oral every 6 hours PRN Diarrhea  melatonin 3 milliGRAM(s) Oral at bedtime PRN Insomnia  oxycodone    5 mG/acetaminophen 325 mG 1 Tablet(s) Oral every 4 hours PRN Mild Pain (1 - 3)  senna 2 Tablet(s) Oral at bedtime PRN Constipation  sodium chloride 0.9% lock flush 10 milliLiter(s) IV Push every 1 hour PRN Pre/post blood products, medications, blood draw, and to maintain line patency

## 2022-02-17 NOTE — PROGRESS NOTE ADULT - SUBJECTIVE AND OBJECTIVE BOX
Patient is a 41y Female who reports no complaints as new    * pt seen earlier today on HD   no fevers overnight   on HD    PRBC at HD today    poor IV access       MEDICATIONS  (STANDING):  carboxymethylcellulose 0.5% Ophthalmic Solution 1 Drop(s) Both EYES two times a day  chlorhexidine 4% Liquid 1 Application(s) Topical <User Schedule>  collagenase Ointment 1 Application(s) Topical daily  epoetin amee-epbx (RETACRIT) Injectable 29511 Unit(s) IV Push <User Schedule>  fluconAZOLE IVPB 200 milliGRAM(s) IV Intermittent every 24 hours  gabapentin 100 milliGRAM(s) Oral three times a day  hydroxychloroquine 200 milliGRAM(s) Oral daily  levETIRAcetam 250 milliGRAM(s) Oral <User Schedule>  levETIRAcetam 500 milliGRAM(s) Oral daily  metoprolol tartrate 12.5 milliGRAM(s) Oral two times a day  pantoprazole    Tablet 40 milliGRAM(s) Oral before breakfast  sevelamer carbonate 800 milliGRAM(s) Oral three times a day with meals  vancomycin  IVPB 500 milliGRAM(s) IV Intermittent <User Schedule>    Vital Signs Last 24 Hrs  T(C): 38.8 (17 Feb 2022 16:34), Max: 38.8 (17 Feb 2022 16:34)  T(F): 101.8 (17 Feb 2022 16:34), Max: 101.8 (17 Feb 2022 16:34)  HR: 107 (17 Feb 2022 11:35) (98 - 112)  BP: 137/99 (17 Feb 2022 11:35) (124/92 - 155/90)  BP(mean): --  RR: 18 (17 Feb 2022 11:35) (18 - 18)  SpO2: 98% (17 Feb 2022 06:21) (96% - 98%)    Vital Signs Last 24 Hrs  T(C): 38.8 (17 Feb 2022 16:34), Max: 38.8 (17 Feb 2022 16:34)  T(F): 101.8 (17 Feb 2022 16:34), Max: 101.8 (17 Feb 2022 16:34)  HR: 107 (17 Feb 2022 11:35) (98 - 112)  BP: 137/99 (17 Feb 2022 11:35) (124/92 - 155/90)  BP(mean): --  RR: 18 (17 Feb 2022 11:35) (18 - 18)  SpO2: 98% (17 Feb 2022 06:21) (96% - 98%)        PHYSICAL EXAM:    Constitutional:frail, ill appearing  HEENT: dry MM  dist  Cardiovascular: S1 and S2  Extremities: No peripheral edema  Neurological: awake on HD   hd cath      LABS:                                   7.3    9.86  )-----------( 269      ( 17 Feb 2022 07:35 )             22.6                         8.5    8.35  )-----------( 256      ( 16 Feb 2022 07:45 )             26.3         130    |  97     |  19     ----------------------------<  88        17 Feb 2022 07:35  3.2     |  23     |  4.33     131    |  97     |  28     ----------------------------<  91        15 Feb 2022 10:52  3.0     |  23     |  5.21     Ca    7.8        17 Feb 2022 07:35  Ca    7.8        15 Feb 2022 10:52    Phos  4.0       15 Feb 2022 10:52      TPro  x      /  Alb  1.6    /  TBili  x      /        15 Feb 2022 10:52  DBili  x      /  AST  x      /  ALT  x      /  AlkPhos  x                Urine Studies:          RADIOLOGY & ADDITIONAL STUDIES:

## 2022-02-17 NOTE — PROGRESS NOTE ADULT - NUTRITIONAL ASSESSMENT
This patient has been assessed with a concern for Malnutrition and has been determined to have a diagnosis/diagnoses of Severe protein-calorie malnutrition and Underweight (BMI < 19).    This patient is being managed with:   Diet NPO after Midnight-     NPO Start Date: 16-Feb-2022   NPO Start Time: 23:59  Except Medications  Entered: Feb 16 2022  1:10PM    Diet Renal Restrictions-  For patients receiving Renal Replacement - No Protein Restr No Conc K No Conc Phos Low Sodium  Entered: Feb 4 2022  9:45PM    
This patient has been assessed with a concern for Malnutrition and has been determined to have a diagnosis/diagnoses of Severe protein-calorie malnutrition and Underweight (BMI < 19).    This patient is being managed with:   Diet Renal Restrictions-  For patients receiving Renal Replacement - No Protein Restr No Conc K No Conc Phos Low Sodium  Entered: Feb 4 2022  9:45PM    
This patient has been assessed with a concern for Malnutrition and has been determined to have a diagnosis/diagnoses of Severe protein-calorie malnutrition and Underweight (BMI < 19).    This patient is being managed with:   Diet Renal Restrictions-  For patients receiving Renal Replacement - No Protein Restr No Conc K No Conc Phos Low Sodium  Entered: Feb 17 2022 11:10AM    
This patient has been assessed with a concern for Malnutrition and has been determined to have a diagnosis/diagnoses of Severe protein-calorie malnutrition and Underweight (BMI < 19).    This patient is being managed with:   Diet NPO after Midnight-     NPO Start Date: 10-Feb-2022   NPO Start Time: 23:59  Entered: Feb 10 2022  3:17PM    Diet Renal Restrictions-  For patients receiving Renal Replacement - No Protein Restr No Conc K No Conc Phos Low Sodium  Entered: Feb 4 2022  9:45PM    
This patient has been assessed with a concern for Malnutrition and has been determined to have a diagnosis/diagnoses of Severe protein-calorie malnutrition and Underweight (BMI < 19).    This patient is being managed with:   Diet Renal Restrictions-  For patients receiving Renal Replacement - No Protein Restr No Conc K No Conc Phos Low Sodium  Entered: Feb 4 2022  9:45PM    
This patient has been assessed with a concern for Malnutrition and has been determined to have a diagnosis/diagnoses of Severe protein-calorie malnutrition and Underweight (BMI < 19).    This patient is being managed with:   Diet NPO after Midnight-     NPO Start Date: 10-Feb-2022   NPO Start Time: 23:59  Entered: Feb 10 2022  3:17PM    Diet Renal Restrictions-  For patients receiving Renal Replacement - No Protein Restr No Conc K No Conc Phos Low Sodium  Entered: Feb 4 2022  9:45PM

## 2022-02-17 NOTE — PROGRESS NOTE ADULT - SUBJECTIVE AND OBJECTIVE BOX
Date of service: 02-17-22 @ 11:04    Patient had fever on late afternoon of 2/16; none since; is undergoing dialysis   Is comfortable, no  specific complaints otherwise        ROS: no  chills; denies dizziness, no HA, no SOB or cough, no abdominal pain, no diarrhea or constipation; no dysuria, no urinary frequency, no legs pain, no rashes    MEDICATIONS  (STANDING):  carboxymethylcellulose 0.5% Ophthalmic Solution 1 Drop(s) Both EYES two times a day  chlorhexidine 4% Liquid 1 Application(s) Topical <User Schedule>  collagenase Ointment 1 Application(s) Topical daily  epoetin amee-epbx (RETACRIT) Injectable 87094 Unit(s) IV Push <User Schedule>  fluconAZOLE IVPB 200 milliGRAM(s) IV Intermittent every 24 hours  gabapentin 100 milliGRAM(s) Oral three times a day  hydroxychloroquine 200 milliGRAM(s) Oral daily  levETIRAcetam 250 milliGRAM(s) Oral <User Schedule>  levETIRAcetam 500 milliGRAM(s) Oral daily  metoprolol tartrate 12.5 milliGRAM(s) Oral two times a day  pantoprazole    Tablet 40 milliGRAM(s) Oral before breakfast  sevelamer carbonate 800 milliGRAM(s) Oral three times a day with meals  vancomycin  IVPB 500 milliGRAM(s) IV Intermittent <User Schedule>    MEDICATIONS  (PRN):  acetaminophen     Tablet .. 650 milliGRAM(s) Oral every 6 hours PRN Temp greater or equal to 38.5C (101.3F), Mild Pain (1 - 3)  acetaminophen   IVPB .. 1000 milliGRAM(s) IV Intermittent once PRN Temp greater or equal to 38.5C (101.3F)  albumin human 25% IVPB 50 milliLiter(s) IV Intermittent every 1 hour PRN SBP less than 100  ALBUTerol    90 MICROgram(s) HFA Inhaler 2 Puff(s) Inhalation every 6 hours PRN Shortness of Breath and/or Wheezing  diphenhydrAMINE 25 milliGRAM(s) Oral every 6 hours PRN Rash and/or Itching  loperamide 2 milliGRAM(s) Oral every 6 hours PRN Diarrhea  melatonin 3 milliGRAM(s) Oral at bedtime PRN Insomnia  oxycodone    5 mG/acetaminophen 325 mG 1 Tablet(s) Oral every 4 hours PRN Mild Pain (1 - 3)  senna 2 Tablet(s) Oral at bedtime PRN Constipation  sodium chloride 0.9% lock flush 10 milliLiter(s) IV Push every 1 hour PRN Pre/post blood products, medications, blood draw, and to maintain line patency      Vital Signs Last 24 Hrs  T(C): 37.7 (17 Feb 2022 10:40), Max: 38.4 (16 Feb 2022 16:40)  T(F): 99.8 (17 Feb 2022 10:40), Max: 101.1 (16 Feb 2022 16:40)  HR: 104 (17 Feb 2022 11:00) (98 - 112)  BP: 132/93 (17 Feb 2022 11:00) (124/92 - 155/90)  BP(mean): --  RR: 18 (17 Feb 2022 11:00) (18 - 18)  SpO2: 98% (17 Feb 2022 06:21) (96% - 98%)        Physical Exam:          Physical exam:  Constitutional: frail looking  HEENT: NC/AT, EOMI, PERRLA, conjunctivae clear; ears and nose atraumatic; poor dentition  Neck: supple; thyroid not palpable, right neck shiley in place  Shiley cath present  Back: no tenderness  Respiratory: respiratory effort normal; clear to auscultation  Cardiovascular: S1S2 regular, no murmurs  Abdomen: soft, not tender, not distended, positive BS; liver and spleen WNL  Genitourinary: no suprapubic tenderness  Musculoskeletal: no muscle tenderness, no joint swelling or tenderness  Extremities: no pedal edema  Neurological/ Psychiatric: AxOx3, Judgement and insight normal;  moving all extremities  Skin: no rashes; no palpable lesions, stage IV sacral ulcer wound clean no drainage    Labs: reviewed                   Labs:                        7.3    9.86  )-----------( 269      ( 17 Feb 2022 07:35 )             22.6     02-17    130<L>  |  97  |  19  ----------------------------<  88  3.2<L>   |  23  |  4.33<H>    Ca    7.8<L>      17 Feb 2022 07:35             Cultures:       Culture - Blood (collected 02-15-22 @ 08:07)  Source: .Blood None  Preliminary Report (02-16-22 @ 12:01):    No growth to date.    Culture - Blood (collected 02-15-22 @ 08:03)  Source: .Blood None  Preliminary Report (02-16-22 @ 12:01):    No growth to date.    Culture - Blood (collected 02-12-22 @ 21:59)  Source: .Blood None  Preliminary Report (02-14-22 @ 11:01):    No growth to date.    Culture - Blood (collected 02-12-22 @ 21:59)  Source: .Blood None  Preliminary Report (02-14-22 @ 11:01):    No growth to date.    Culture - Fungal, Blood (collected 02-12-22 @ 12:00)  Source: .Blood BLOOD  Preliminary Report (02-14-22 @ 09:36):    Testing in progress    Culture - Blood (collected 02-11-22 @ 08:08)  Source: .Blood None  Gram Stain (02-12-22 @ 12:48):    Growth in aerobic bottle: Yeast like cells  Preliminary Report (02-15-22 @ 14:57):    Growth in aerobic bottle: Candida albicans    Growth in aerobic bottle: Candida tropicalis Susceptibility to follow.    ***Blood Panel PCR results on this specimen are available    approximately 3 hours after the Gram stain result.***    Gram stain, PCR, and/or culture results may not always    correspond due to difference in methodologies.    ************************************************************    This PCR assay was performed by multiplex PCR. This    Assay tests for 66 bacterial and resistance gene targets.    Please refer to the [facility] test directory    at [email or web address] for details.  Organism: Blood Culture PCR  Candida albicans (02-15-22 @ 14:06)  Organism: Candida albicans (02-15-22 @ 14:06)      -  Fluconazole: S 0.25 Fluconazole = Data established for mucosal disease, and is generally applicable for invasive disease.  Susceptibility is dependent on achieving the maximal possible blood level.  Doses of 400 MG/day or more may be required in adults with normal renalfunction and body habitus.      -  Interpretation: See note This test method is not approved by the FDA and is for research use only.  The performance characteristics of this assay may have been determined by [facility] and [facility], [facility].                            N/I - No interpretation available                                                         SDD – Sensitive Dose Dependent      Method Type: YSTMIC  Organism: Blood Culture PCR (02-12-22 @ 14:53)      -  Candida albicans: Detec      Method Type: PCR              Radiology: all available radiological tests reviewed        Questionable mild hyperenhancement of the urethra. Correlate with   urinalysis.    Findings were discussed with Dr. Epperson at 4:37 pm on 2/4/2022.        PROCEDURE DATE:  02/04/2022          INTERPRETATION:  CLINICAL INFORMATION: Infected decubitus ulcer    COMPARISON: 1/21/2022, 1/18/2022.    CONTRAST/COMPLICATIONS:  IV Contrast: Omnipaque 350  90 cc administered   10 cc discarded  Oral Contrast: NONE  Complications: None reported at time of study completion    PROCEDURE:  CT of the Abdomen and Pelvis was performed.  Sagittal and coronal reformats were performed.    FINDINGS:  LOWER CHEST: Within normal limits.    LIVER: There is thrombus surrounding the catheter tip in the intrahepatic   IVC.  BILE DUCTS: Normal caliber.  GALLBLADDER: Cholecystectomy.  SPLEEN: Borderline enlarged.  PANCREAS: Within normal limits.  ADRENALS: Within normal limits.  KIDNEYS/URETERS: Within normal limits.    BLADDER: Within normal limits. Question mild hyperenhancement of the   urethra.  REPRODUCTIVE ORGANS: Uterus and adnexa within normal limits.    BOWEL: No bowel obstruction. Large amount of fluid/stool throughout the   colon. Question mild thickening versus underdistention of the sigmoid   colon. Appendix is not visualized. No evidence of inflammation in the   pericecal region.  PERITONEUM: No ascites.  VESSELS: Within the intrahepatic IVC, as described above.  RETROPERITONEUM/LYMPH NODES: Multiple enlarged retroperitoneal nodes. . A   left para-aortic node measures 1.9 x 1.4 cm (2:54). A left common iliac   node measures 2.4 x 1.3 cm (2:75)  ABDOMINAL WALL: Left sacral decubitus ulcer without definite evidence of   osseous erosion.  BONES: Degenerative changes. Degenerative changes of the right hip.    IMPRESSION:  There is thrombus surrounding the catheter tip in the intrahepatic IVC.    Retroperitoneal adenopathy, as described above. Differential includes   lymphoma.    Mildly distended, fluid-filled colon. Mild thickening versus   underdistention of the sigmoid colon.    Left sacral decubitus ulcer without definite osseous erosion. Correlate   with MRI if clinically warranted.    Additional findings as above.            < from: TTE Echo Complete w/o Contrast w/ Doppler (02.08.22 @ 09:40) >     Impression     Summary     The left ventricle is normal in size.   Moderately reduced left ventricular systolic function.   Estimated left ventricular ejection fraction is 40%.   Normal appearing right atrium.   A catheter wire is seen in the right atrium.   Normal aortic valve structure and function.   Trivial aortic regurgitation is present.   Normal appearing mitral valve structure and function.   Mild (1+) mitral regurgitation is present.      < end of copied text >          Advanced directives addressed: full resuscitation

## 2022-02-17 NOTE — PROGRESS NOTE ADULT - ASSESSMENT
42 yo female w/PMH of ESRD (M/W/F), GERD, bacteremia, seizures, epilepsy, depression, COPD, HTN, heroin abuse, RA, Cdiff colitis in Dec 2021 smoker, recent admission for Aspiration Pna who  presents to [place]  ED via ambulance from dialysis center for brief loss of consciousness, questionable seizure vs syncope.        ESRD on HD TTS   MRSA bacteremia w persistent fevers - now Candidemia on 2/11 ( same day catheter was inserted )   Fever yest and today     d.w  ID Dr Harrington and Dr Wilkinson  due to poor venous access for blood draws and repeated HD cathter insertions putting her at risk for stricture/stenosis of central veins would ideally like to keep cathter venous acccess in till permacath can be inserted if afebrile or cx negative   as per ID  will draw blood cx from catheter to determine if catheter needs to be removed   if so would need at least 5-7 days line free /afebrile to insert pemacath - HD today   UF as tolerated    Will need perm access - Vascular following for access eventual AVG when stable        HTN  - oral meds    Anemia    TAMARA at missed HD and if holding HD would then give SC TIW to avoid missed doses    no obvios blood loss   no IV iron due to bacteremia   no obvious blood loss       Bacteremia /fungemia   HIV status negative   ??  GRAHAM if persistent bacteremia/fungemia seems prudent - Cardio eval    d/w Dr Wilkinson and Dr Harrnigton earlier today   d/ HD RN earlier

## 2022-02-18 ENCOUNTER — TRANSCRIPTION ENCOUNTER (OUTPATIENT)
Age: 42
End: 2022-02-18

## 2022-02-18 ENCOUNTER — INPATIENT (INPATIENT)
Facility: HOSPITAL | Age: 42
LOS: 0 days | Discharge: ANOTHER HOSPITAL | DRG: 314 | End: 2022-02-19
Attending: INTERNAL MEDICINE
Payer: MEDICAID

## 2022-02-18 VITALS
OXYGEN SATURATION: 99 % | SYSTOLIC BLOOD PRESSURE: 149 MMHG | TEMPERATURE: 98 F | RESPIRATION RATE: 18 BRPM | HEART RATE: 94 BPM | DIASTOLIC BLOOD PRESSURE: 96 MMHG

## 2022-02-18 VITALS — RESPIRATION RATE: 20 BRPM | OXYGEN SATURATION: 99 % | HEART RATE: 92 BPM

## 2022-02-18 DIAGNOSIS — R78.81 BACTEREMIA: ICD-10-CM

## 2022-02-18 DIAGNOSIS — Z87.19 PERSONAL HISTORY OF OTHER DISEASES OF THE DIGESTIVE SYSTEM: Chronic | ICD-10-CM

## 2022-02-18 DIAGNOSIS — K80.20 CALCULUS OF GALLBLADDER WITHOUT CHOLECYSTITIS WITHOUT OBSTRUCTION: Chronic | ICD-10-CM

## 2022-02-18 DIAGNOSIS — Z98.51 TUBAL LIGATION STATUS: Chronic | ICD-10-CM

## 2022-02-18 LAB
ANION GAP SERPL CALC-SCNC: 8 MMOL/L — SIGNIFICANT CHANGE UP (ref 5–17)
APPEARANCE UR: ABNORMAL
BILIRUB UR-MCNC: NEGATIVE — SIGNIFICANT CHANGE UP
BUN SERPL-MCNC: 12 MG/DL — SIGNIFICANT CHANGE UP (ref 7–23)
CALCIUM SERPL-MCNC: 7.9 MG/DL — LOW (ref 8.5–10.1)
CHLORIDE SERPL-SCNC: 99 MMOL/L — SIGNIFICANT CHANGE UP (ref 96–108)
CO2 SERPL-SCNC: 24 MMOL/L — SIGNIFICANT CHANGE UP (ref 22–31)
COLOR SPEC: YELLOW — SIGNIFICANT CHANGE UP
CREAT SERPL-MCNC: 3.17 MG/DL — HIGH (ref 0.5–1.3)
CULTURE RESULTS: SIGNIFICANT CHANGE UP
CULTURE RESULTS: SIGNIFICANT CHANGE UP
DIFF PNL FLD: ABNORMAL
EPI CELLS # UR: ABNORMAL
GLUCOSE SERPL-MCNC: 107 MG/DL — HIGH (ref 70–99)
GLUCOSE UR QL: NEGATIVE MG/DL — SIGNIFICANT CHANGE UP
GRAM STN FLD: SIGNIFICANT CHANGE UP
HCT VFR BLD CALC: 26.9 % — LOW (ref 34.5–45)
HGB BLD-MCNC: 8.8 G/DL — LOW (ref 11.5–15.5)
KETONES UR-MCNC: NEGATIVE — SIGNIFICANT CHANGE UP
LEUKOCYTE ESTERASE UR-ACNC: ABNORMAL
MCHC RBC-ENTMCNC: 28.3 PG — SIGNIFICANT CHANGE UP (ref 27–34)
MCHC RBC-ENTMCNC: 32.7 GM/DL — SIGNIFICANT CHANGE UP (ref 32–36)
MCV RBC AUTO: 86.5 FL — SIGNIFICANT CHANGE UP (ref 80–100)
NITRITE UR-MCNC: NEGATIVE — SIGNIFICANT CHANGE UP
PH UR: 6.5 — SIGNIFICANT CHANGE UP (ref 5–8)
PLATELET # BLD AUTO: 249 K/UL — SIGNIFICANT CHANGE UP (ref 150–400)
POTASSIUM SERPL-MCNC: 3.5 MMOL/L — SIGNIFICANT CHANGE UP (ref 3.5–5.3)
POTASSIUM SERPL-SCNC: 3.5 MMOL/L — SIGNIFICANT CHANGE UP (ref 3.5–5.3)
PROT UR-MCNC: 30 MG/DL
RBC # BLD: 3.11 M/UL — LOW (ref 3.8–5.2)
RBC # FLD: 17.5 % — HIGH (ref 10.3–14.5)
RBC CASTS # UR COMP ASSIST: SIGNIFICANT CHANGE UP /HPF (ref 0–4)
SODIUM SERPL-SCNC: 131 MMOL/L — LOW (ref 135–145)
SP GR SPEC: 1.01 — SIGNIFICANT CHANGE UP (ref 1.01–1.02)
SPECIMEN SOURCE: SIGNIFICANT CHANGE UP
SPECIMEN SOURCE: SIGNIFICANT CHANGE UP
UROBILINOGEN FLD QL: NEGATIVE MG/DL — SIGNIFICANT CHANGE UP
WBC # BLD: 7.19 K/UL — SIGNIFICANT CHANGE UP (ref 3.8–10.5)
WBC # FLD AUTO: 7.19 K/UL — SIGNIFICANT CHANGE UP (ref 3.8–10.5)
WBC UR QL: SIGNIFICANT CHANGE UP /HPF (ref 0–5)

## 2022-02-18 PROCEDURE — 99239 HOSP IP/OBS DSCHRG MGMT >30: CPT

## 2022-02-18 PROCEDURE — 93306 TTE W/DOPPLER COMPLETE: CPT | Mod: 26

## 2022-02-18 PROCEDURE — 93880 EXTRACRANIAL BILAT STUDY: CPT | Mod: 26

## 2022-02-18 PROCEDURE — 93312 ECHO TRANSESOPHAGEAL: CPT | Mod: 26

## 2022-02-18 PROCEDURE — 71045 X-RAY EXAM CHEST 1 VIEW: CPT | Mod: 26

## 2022-02-18 RX ORDER — GABAPENTIN 400 MG/1
100 CAPSULE ORAL THREE TIMES A DAY
Refills: 0 | Status: DISCONTINUED | OUTPATIENT
Start: 2022-02-18 | End: 2022-02-19

## 2022-02-18 RX ORDER — COLLAGENASE CLOSTRIDIUM HIST. 250 UNIT/G
1 OINTMENT (GRAM) TOPICAL
Qty: 0 | Refills: 0 | DISCHARGE
Start: 2022-02-18

## 2022-02-18 RX ORDER — FLUCONAZOLE 150 MG/1
200 TABLET ORAL EVERY 24 HOURS
Refills: 0 | Status: DISCONTINUED | OUTPATIENT
Start: 2022-02-18 | End: 2022-02-19

## 2022-02-18 RX ORDER — LANOLIN ALCOHOL/MO/W.PET/CERES
3 CREAM (GRAM) TOPICAL AT BEDTIME
Refills: 0 | Status: DISCONTINUED | OUTPATIENT
Start: 2022-02-18 | End: 2022-02-19

## 2022-02-18 RX ORDER — ACETAMINOPHEN 500 MG
100 TABLET ORAL
Qty: 0 | Refills: 0 | DISCHARGE
Start: 2022-02-18

## 2022-02-18 RX ORDER — OXYCODONE HYDROCHLORIDE 5 MG/1
1 TABLET ORAL
Qty: 0 | Refills: 0 | DISCHARGE
Start: 2022-02-18

## 2022-02-18 RX ORDER — ERYTHROPOIETIN 10000 [IU]/ML
10000 INJECTION, SOLUTION INTRAVENOUS; SUBCUTANEOUS
Qty: 0 | Refills: 0 | DISCHARGE
Start: 2022-02-18

## 2022-02-18 RX ORDER — ACETAMINOPHEN 500 MG
2 TABLET ORAL
Qty: 0 | Refills: 0 | DISCHARGE

## 2022-02-18 RX ORDER — FLUCONAZOLE 150 MG/1
0 TABLET ORAL
Qty: 0 | Refills: 0 | DISCHARGE
Start: 2022-02-18

## 2022-02-18 RX ORDER — COLLAGENASE CLOSTRIDIUM HIST. 250 UNIT/G
1 OINTMENT (GRAM) TOPICAL DAILY
Refills: 0 | Status: DISCONTINUED | OUTPATIENT
Start: 2022-02-18 | End: 2022-02-19

## 2022-02-18 RX ORDER — VANCOMYCIN HCL 1 G
500 VIAL (EA) INTRAVENOUS
Qty: 0 | Refills: 0 | DISCHARGE
Start: 2022-02-18

## 2022-02-18 RX ORDER — ONDANSETRON 8 MG/1
4 TABLET, FILM COATED ORAL EVERY 8 HOURS
Refills: 0 | Status: DISCONTINUED | OUTPATIENT
Start: 2022-02-18 | End: 2022-02-18

## 2022-02-18 RX ORDER — LEVETIRACETAM 250 MG/1
1 TABLET, FILM COATED ORAL
Qty: 0 | Refills: 0 | DISCHARGE
Start: 2022-02-18

## 2022-02-18 RX ORDER — OXYCODONE HYDROCHLORIDE 5 MG/1
5 TABLET ORAL EVERY 4 HOURS
Refills: 0 | Status: DISCONTINUED | OUTPATIENT
Start: 2022-02-18 | End: 2022-02-18

## 2022-02-18 RX ORDER — SENNA PLUS 8.6 MG/1
2 TABLET ORAL AT BEDTIME
Refills: 0 | Status: DISCONTINUED | OUTPATIENT
Start: 2022-02-18 | End: 2022-02-19

## 2022-02-18 RX ORDER — SEVELAMER CARBONATE 2400 MG/1
1 POWDER, FOR SUSPENSION ORAL
Qty: 0 | Refills: 0 | DISCHARGE
Start: 2022-02-18

## 2022-02-18 RX ORDER — METOPROLOL TARTRATE 50 MG
12.5 TABLET ORAL
Refills: 0 | Status: DISCONTINUED | OUTPATIENT
Start: 2022-02-18 | End: 2022-02-19

## 2022-02-18 RX ORDER — VANCOMYCIN HCL 1 G
500 VIAL (EA) INTRAVENOUS ONCE
Refills: 0 | Status: COMPLETED | OUTPATIENT
Start: 2022-02-19 | End: 2022-02-19

## 2022-02-18 RX ORDER — CHLORHEXIDINE GLUCONATE 213 G/1000ML
15 SOLUTION TOPICAL
Refills: 0 | Status: DISCONTINUED | OUTPATIENT
Start: 2022-02-18 | End: 2022-02-19

## 2022-02-18 RX ORDER — COLLAGENASE CLOSTRIDIUM HIST. 250 UNIT/G
1 OINTMENT (GRAM) TOPICAL
Qty: 0 | Refills: 0 | DISCHARGE

## 2022-02-18 RX ORDER — LANOLIN ALCOHOL/MO/W.PET/CERES
1 CREAM (GRAM) TOPICAL
Qty: 0 | Refills: 0 | DISCHARGE
Start: 2022-02-18

## 2022-02-18 RX ORDER — FLUCONAZOLE 150 MG/1
1 TABLET ORAL
Qty: 0 | Refills: 0 | DISCHARGE
Start: 2022-02-18

## 2022-02-18 RX ORDER — OXYCODONE HYDROCHLORIDE 5 MG/1
5 TABLET ORAL EVERY 4 HOURS
Refills: 0 | Status: DISCONTINUED | OUTPATIENT
Start: 2022-02-18 | End: 2022-02-19

## 2022-02-18 RX ORDER — ERYTHROPOIETIN 10000 [IU]/ML
0 INJECTION, SOLUTION INTRAVENOUS; SUBCUTANEOUS
Qty: 0 | Refills: 0 | DISCHARGE
Start: 2022-02-18

## 2022-02-18 RX ORDER — SODIUM CHLORIDE 9 MG/ML
3 INJECTION INTRAMUSCULAR; INTRAVENOUS; SUBCUTANEOUS EVERY 8 HOURS
Refills: 0 | Status: DISCONTINUED | OUTPATIENT
Start: 2022-02-18 | End: 2022-02-19

## 2022-02-18 RX ORDER — ACETAMINOPHEN 500 MG
2 TABLET ORAL
Qty: 0 | Refills: 0 | DISCHARGE
Start: 2022-02-18

## 2022-02-18 RX ORDER — ALBUMIN HUMAN 25 %
0 VIAL (ML) INTRAVENOUS
Qty: 0 | Refills: 0 | DISCHARGE
Start: 2022-02-18

## 2022-02-18 RX ORDER — GABAPENTIN 400 MG/1
1 CAPSULE ORAL
Qty: 0 | Refills: 0 | DISCHARGE
Start: 2022-02-18

## 2022-02-18 RX ORDER — ONDANSETRON 8 MG/1
4 TABLET, FILM COATED ORAL ONCE
Refills: 0 | Status: COMPLETED | OUTPATIENT
Start: 2022-02-18 | End: 2022-02-18

## 2022-02-18 RX ORDER — SENNA PLUS 8.6 MG/1
2 TABLET ORAL
Qty: 0 | Refills: 0 | DISCHARGE
Start: 2022-02-18

## 2022-02-18 RX ORDER — METOPROLOL TARTRATE 50 MG
12.5 TABLET ORAL
Qty: 0 | Refills: 0 | DISCHARGE
Start: 2022-02-18

## 2022-02-18 RX ORDER — ALBUTEROL 90 UG/1
2 AEROSOL, METERED ORAL EVERY 6 HOURS
Refills: 0 | Status: DISCONTINUED | OUTPATIENT
Start: 2022-02-18 | End: 2022-02-19

## 2022-02-18 RX ORDER — ERYTHROPOIETIN 10000 [IU]/ML
10000 INJECTION, SOLUTION INTRAVENOUS; SUBCUTANEOUS
Refills: 0 | Status: DISCONTINUED | OUTPATIENT
Start: 2022-02-18 | End: 2022-02-19

## 2022-02-18 RX ORDER — FLUCONAZOLE 150 MG/1
200 TABLET ORAL
Qty: 0 | Refills: 0 | DISCHARGE
Start: 2022-02-18

## 2022-02-18 RX ORDER — LEVETIRACETAM 250 MG/1
500 TABLET, FILM COATED ORAL DAILY
Refills: 0 | Status: DISCONTINUED | OUTPATIENT
Start: 2022-02-18 | End: 2022-02-19

## 2022-02-18 RX ORDER — CHLORHEXIDINE GLUCONATE 213 G/1000ML
1 SOLUTION TOPICAL
Refills: 0 | Status: DISCONTINUED | OUTPATIENT
Start: 2022-02-18 | End: 2022-02-19

## 2022-02-18 RX ORDER — PANTOPRAZOLE SODIUM 20 MG/1
40 TABLET, DELAYED RELEASE ORAL
Refills: 0 | Status: DISCONTINUED | OUTPATIENT
Start: 2022-02-18 | End: 2022-02-19

## 2022-02-18 RX ORDER — PANTOPRAZOLE SODIUM 20 MG/1
1 TABLET, DELAYED RELEASE ORAL
Qty: 0 | Refills: 0 | DISCHARGE
Start: 2022-02-18

## 2022-02-18 RX ORDER — ALBUMIN HUMAN 25 %
50 VIAL (ML) INTRAVENOUS
Qty: 0 | Refills: 0 | DISCHARGE
Start: 2022-02-18

## 2022-02-18 RX ORDER — LEVETIRACETAM 250 MG/1
250 TABLET, FILM COATED ORAL
Refills: 0 | Status: DISCONTINUED | OUTPATIENT
Start: 2022-02-18 | End: 2022-02-19

## 2022-02-18 RX ORDER — ACETAMINOPHEN 500 MG
650 TABLET ORAL EVERY 6 HOURS
Refills: 0 | Status: DISCONTINUED | OUTPATIENT
Start: 2022-02-18 | End: 2022-02-19

## 2022-02-18 RX ORDER — SEVELAMER CARBONATE 2400 MG/1
800 POWDER, FOR SUSPENSION ORAL
Refills: 0 | Status: DISCONTINUED | OUTPATIENT
Start: 2022-02-18 | End: 2022-02-19

## 2022-02-18 RX ORDER — HYDROXYCHLOROQUINE SULFATE 200 MG
1 TABLET ORAL
Qty: 0 | Refills: 0 | DISCHARGE
Start: 2022-02-18

## 2022-02-18 RX ORDER — HYDROXYCHLOROQUINE SULFATE 200 MG
200 TABLET ORAL DAILY
Refills: 0 | Status: DISCONTINUED | OUTPATIENT
Start: 2022-02-18 | End: 2022-02-19

## 2022-02-18 RX ADMIN — OXYCODONE AND ACETAMINOPHEN 1 TABLET(S): 5; 325 TABLET ORAL at 06:58

## 2022-02-18 RX ADMIN — SEVELAMER CARBONATE 800 MILLIGRAM(S): 2400 POWDER, FOR SUSPENSION ORAL at 13:09

## 2022-02-18 RX ADMIN — FLUCONAZOLE 100 MILLIGRAM(S): 150 TABLET ORAL at 13:13

## 2022-02-18 RX ADMIN — LEVETIRACETAM 250 MILLIGRAM(S): 250 TABLET, FILM COATED ORAL at 18:51

## 2022-02-18 RX ADMIN — OXYCODONE HYDROCHLORIDE 5 MILLIGRAM(S): 5 TABLET ORAL at 15:34

## 2022-02-18 RX ADMIN — Medication 1 APPLICATION(S): at 14:45

## 2022-02-18 RX ADMIN — Medication 1 DROP(S): at 10:00

## 2022-02-18 RX ADMIN — ONDANSETRON 4 MILLIGRAM(S): 8 TABLET, FILM COATED ORAL at 09:21

## 2022-02-18 RX ADMIN — Medication 12.5 MILLIGRAM(S): at 13:07

## 2022-02-18 RX ADMIN — Medication 200 MILLIGRAM(S): at 13:08

## 2022-02-18 RX ADMIN — ONDANSETRON 4 MILLIGRAM(S): 8 TABLET, FILM COATED ORAL at 21:07

## 2022-02-18 RX ADMIN — SEVELAMER CARBONATE 800 MILLIGRAM(S): 2400 POWDER, FOR SUSPENSION ORAL at 18:51

## 2022-02-18 RX ADMIN — LEVETIRACETAM 500 MILLIGRAM(S): 250 TABLET, FILM COATED ORAL at 13:08

## 2022-02-18 RX ADMIN — SODIUM CHLORIDE 3 MILLILITER(S): 9 INJECTION INTRAMUSCULAR; INTRAVENOUS; SUBCUTANEOUS at 22:33

## 2022-02-18 RX ADMIN — OXYCODONE HYDROCHLORIDE 5 MILLIGRAM(S): 5 TABLET ORAL at 22:40

## 2022-02-18 RX ADMIN — PANTOPRAZOLE SODIUM 40 MILLIGRAM(S): 20 TABLET, DELAYED RELEASE ORAL at 06:58

## 2022-02-18 NOTE — DISCHARGE NOTE PROVIDER - HOSPITAL COURSE
41 year-old woman with ESRD on HD, rheumatoid arthritis, Raynaud's, tobacco and IV drug use disorder, admitted for episode of loss of consciousness, fever, found to have sepsis with organ dysfunction, MRSA bacteremia, and dialysis catheter tip thrombus. Since then, she was additional found to have C.abicans and C.tropicalis on blood cultures taken when she was noted to have persisting fever despite removal of permcath and several doses of IV vancomycin. Caspofungin added and patient resuscitated with improve thereafter. Antifungal switched to Diflucan. however, patient once again febrile. Obtained GRAHAM, significant for vegetation.     Sepsis with organ dysfunction due to MRSA bacteremia, present upon admission, unable to determine if PC source of her IV drug use disorder as source, now C.albicans and C.tropicalis in blood culture as well, new finding cardiac vegetation   In setting of IV drug use disorder, HD permcath. MRSA on blood culture 2/4 for which she was placed on IV vancomycin. Dialysis catheter was removed 2/7 when cultures were reported. TTE done 2/8 negative for vegetations. R IJ Shiley since placed 2/11. Despite IV vancomycin post dialysis and removal of permcath, was noted to have continued fevers. Repeat blood cultures 2/11 ultimately grew C.albicans and C.tropicalis. Started on IV caspofungin 2/12. Repeat cultures 2/12 and 2/15 no growth to date. Switched to PO Diflucan 2/16. Patient now with fevers once again. Recognize that Shiley needs to be removed. However, after lengthy discussion with Nephrology and ID, given patient's past and current issues with vascular access, risk for stenosis / fibrosis to vessels with repeated line changes, difficulty getting labs without such access, decision made not to remove Shiley and instead draw blood cultures from Shiley after dialysis 2/17. Ordered GRAHAM to r/o endocarditis. GRAHAM today notable for vegetation. Now planning transfer to [facility].    - F/u official GRAHAM report  - Continue IV vancomycin post dialysis  - Continue PO Diflucan 200mg daily  - F/u Bld Cxs in process  - Monitor for fever, maintain MAP > 65  - Opthalmology eval requested, awaiting call back from specialist    Dialysis catheter tip thrombus  Catheter since removed due to MRSA bacteremia and sepsis. On Eliquis.  - Continue Eliquis but held for possible procedure    ESRD on HD  Receiving dialysis sessions as per Nephrology, currently via temporary HD catheter as patients permcath was removed this admission on 2/7 in setting of sepsis, bacteremia. Will need more permanent HD access once clinically improved and bacteremia / fungemia cleared. Vascular Surgery following.   - Continue dialysis sessions  - Anticipate possible permcath placement when cleared   - Continue to save R arm (no BP cuffs, IVs or blood draws) and will plan for eventual AVF creation    CKD anemia  May also be further compounded by septicemia and fungemia. S/P PRBC transfusion with HD 2/12 and again 2/17. Hgb 8.8 today, approx at baseline.  - Continue epo    Renal bone disease  - Continue sevelamer    Hyponatremia, hypokalemia  - Nephrology to assist with management    Stage II decubitus wound on coccyx  Stable, appears to be healing. No malodor. No purulence. No locoregional erythema or induration or fluctuance  - Continue wound care  - Turn frequently    Hx of seizure  Stable  - On Keppra    Rheumatoid arthritis  Stable  - Continue Plaquenil    Abnormal imaging of the breast  As noted on CT chest 2/12. Asymmetric right breast density with nonspecific soft tissue infiltration extending to right chest wall.   - Patient will need non-emergent breast imaging correlation to exclude underlying malignancy including inflammatory carcinoma. Case discussed with Onc Dr. Rodriguez and Breast Surgery Dr. Lala-Devereuax    Retroperitoneal lymphadenopathy with a cluster of multiple enlarged left paraaortic and pelvic lymph nodes  As noted on CT abd/pelvis 2/12 and imaging prior. Rather stable in size.   - Patient will need continued follow up regarding these findings    Severe protein calorie malnutrition  Appreciate input from Nutrition  - Encourage po intake, trend weight    RESOLVED HOSPITAL PROBLEMS    Loose stools  Resolved. No hematochezia or melena. No abdominal tenderness. FOBT negative. Cdiff PCR negative. CT with possible mild wall thickening at rectosigmoid junction. Resolved without intervention.     Physical Exam:  Tm 101.8  Tc 97.9  /99    RR 18   O2 97% on 2L/min via NC  GEN: No acute distress  HEENT: NCAT PERRL EOMI  NECK: R IJ Shiley catheter in place, neck supple  CHEST: CTA b/l, no wheezes, rales or rhonchi  CVS: S1 S2 normal, tachycardic to 100  ABD: Soft, non tender, non distended, no rebound, no guarding  EXT: No calf tenderness, no erythema  SKIN: Warm, dry, stage IV sacral ulcer without locoregional tenderness, induration, fluctuance, no purulence  NEURO: Alert, awake, grossly without deficits      Inpatient testing and results as well as current inpatient medications on attached page         41 year-old woman with polysubstance use disorder (tobacco, marijuana, cocaine, IV heroin), HTN, COPD, lupus, RA, ANCA vasculitis, ESRD on HD MWF via PC, GERD, epilepsy, prolonged hospitalization in 2020 during which she was trach'd and PEG'd (since reversed), sacral decubitus wound since 2021, multiple past episodes of seizures vs syncope while on HD, hospitalization 12/15-12/18/21 for seizures. also C.diff colitis 12/2021, re-hospitalized at [facility] 1/18/22-2/2/22 after she was found unresponsive at home after being left alone for an hour, diagnosed with respiratory failure due to aspiration PNA, LLL infiltrate, s/p 7-day course of vancomycin and Zosyn, discharged 2/2/22 only to be re-admitted to [facility] once again 2/4/22 after an episode of loss of consciousness, fever, found to have sepsis with organ dysfunction, MRSA bacteremia and dialysis catheter tip thrombus. Since then, she was additionally found to have C.abicans and C.tropicalis on blood cultures taken when she was noted to have persisting fever despite removal of permcath and several doses of IV vancomycin. Caspofungin added and patient resuscitated with improvement thereafter. Antifungal switched to Diflucan. However, patient once again febrile. Obtained GRAHAM, significant for vegetation.     Sepsis with organ dysfunction due to MRSA bacteremia, present upon admission, unable to determine if PC source of her IV drug use disorder as source, now C.albicans and C.tropicalis in blood culture as well, new finding cardiac vegetation   In setting of IV drug use disorder, HD permcath. MRSA on blood culture 2/4 for which she was placed on IV vancomycin. Dialysis catheter was removed 2/7 when cultures were reported. TTE done 2/8 negative for vegetations. R IJ Shiley since placed 2/11. Despite IV vancomycin post dialysis and removal of permcath, was noted to have continued fevers. Repeat blood cultures 2/11 ultimately grew C.albicans and C.tropicalis. Started on IV caspofungin 2/12. Repeat cultures 2/12 and 2/15 no growth to date. Switched to PO Diflucan 2/16. Patient now with fevers once again. Recognize that Shiley needs to be removed. However, after lengthy discussion with Nephrology and ID, given patient's past and current issues with vascular access, risk for stenosis / fibrosis to vessels with repeated line changes, difficulty getting labs without such access, decision made not to remove Shiley and instead draw blood cultures from Shiley after dialysis 2/17. Ordered GRAHAM to r/o endocarditis. GRAHAM today notable for vegetation. Now planning transfer to [facility].    - F/u official GRAHAM report  - Continue IV vancomycin post dialysis  - Continue PO Diflucan 200mg daily  - F/u Bld Cxs in process  - Monitor for fever, maintain MAP > 65  - Opthalmology eval requested, awaiting call back from specialist    Dialysis catheter tip thrombus  Catheter since removed due to MRSA bacteremia and sepsis. On Eliquis.  - Continue Eliquis but held for possible procedure    ESRD on HD  Receiving dialysis sessions as per Nephrology, currently via temporary HD catheter as patients permcath was removed this admission on 2/7 in setting of sepsis, bacteremia. Will need more permanent HD access once clinically improved and bacteremia / fungemia cleared. Vascular Surgery following.   - Continue dialysis sessions  - Anticipate possible permcath placement when cleared   - Continue to save R arm (no BP cuffs, IVs or blood draws) and will plan for eventual AVF creation    CKD anemia  May also be further compounded by septicemia and fungemia. S/P PRBC transfusion with HD 2/12 and again 2/17. Hgb 8.8 today, approx at baseline.  - Continue epo    Renal bone disease  - Continue sevelamer    Hyponatremia, hypokalemia  - Nephrology to assist with management    Stage II decubitus wound on coccyx  Stable, appears to be healing. No malodor. No purulence. No locoregional erythema or induration or fluctuance  - Continue wound care  - Turn frequently    Hx of seizure  Stable  - On Keppra    Rheumatoid arthritis  Stable  - Continue Plaquenil    Abnormal imaging of the breast  As noted on CT chest 2/12. Asymmetric right breast density with nonspecific soft tissue infiltration extending to right chest wall.   - Patient will need non-emergent breast imaging correlation to exclude underlying malignancy including inflammatory carcinoma. Case discussed with Onc Dr. Rodriguez and Breast Surgery Dr. Lala-Davy    Retroperitoneal lymphadenopathy with a cluster of multiple enlarged left paraaortic and pelvic lymph nodes  As noted on CT abd/pelvis 2/12 and imaging prior. Rather stable in size.   - Patient will need continued follow up regarding these findings    Severe protein calorie malnutrition  Appreciate input from Nutrition  - Encourage po intake, trend weight    RESOLVED HOSPITAL PROBLEMS    Loose stools  Resolved. No hematochezia or melena. No abdominal tenderness. FOBT negative. Cdiff PCR negative. CT with possible mild wall thickening at rectosigmoid junction. Resolved without intervention.     Physical Exam:  Tm 101.8  Tc 97.9  /99    RR 18   O2 97% on 2L/min via NC  GEN: No acute distress  HEENT: NCAT PERRL EOMI  NECK: R IJ Shiley catheter in place, neck supple  CHEST: CTA b/l, no wheezes, rales or rhonchi  CVS: S1 S2 normal, tachycardic to 100  ABD: Soft, non tender, non distended, no rebound, no guarding  EXT: No calf tenderness, no erythema  SKIN: Warm, dry, stage IV sacral ulcer without locoregional tenderness, induration, fluctuance, no purulence  NEURO: Alert, awake, grossly without deficits      Inpatient testing and results as well as current inpatient medications on attached page

## 2022-02-18 NOTE — PROGRESS NOTE ADULT - ASSESSMENT
40 yo female w/PMH of ESRD (M/W/F), GERD, bacteremia, seizures, epilepsy, depression, COPD, HTN, heroin abuse, RA, Cdiff colitis in Dec 2021 smoker, recent admission for Aspiration Pna who  presents to [place]  [facility] via ambulance from dialysis center for brief loss of consciousness, questionable seizure vs syncope.        ESRD on HD TTS   MRSA bacteremia w persistent fevers - now Candidemia on 2/11 ( same day catheter was inserted )   Fever   GRAHAM - large right atrial septal vegetation - will need CT surgery eval - transfer to [facility] per Cardiology asap.  pt aware of the above      if pt remains here over the weekend may continue to use catheter as per ID /Cardiolgy   due to poor venous access and repeated HD cathter insertions putting her at risk for stricture/stenosis of central veins.   would ideally like to keep cathter venous acccess in till permacath can be inserted if afebrile or cx negative       Next HD saturday if still in hospital     2/17 blood cx from catheter penidng     Will need perm access - Vascular following for access eventual AVG when stable     HTN  - oral meds    Anemia    TAMARA at missed HD and if holding HD would then give SC TIW to avoid missed doses    no obvios blood loss   no IV iron due to bacteremia   no obvious blood loss       Bacteremia /fungemia   HIV status negative   GRAHAM as above + RA vegetation - CT eval     remiw  Jaja Atkins  and  Sandy earlier today  42 yo female w/PMH of ESRD (M/W/F), GERD, bacteremia, seizures, epilepsy, depression, COPD, HTN, heroin abuse, RA, Cdiff colitis in Dec 2021 smoker, recent admission for Aspiration Pna who  presents to [place]  ED via ambulance from dialysis center for brief loss of consciousness, questionable seizure vs syncope.        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  pt aware of the above    if pt remains here over the weekend may continue to use catheter as per ID /Cardiolgy as long as hemodynamically stable.   due to poor venous access and repeated HD cathter reinsertions put  her at risk for stricture/stenosis of central veins.   would ideally like to keep cathter venous acccess in till permacath can be inserted when afebrile or cx negative but will need to await endocarditis management       Next HD saturday if still in hospital     2/17 blood cx from catheter penidng     Will need perm access - Vascular following for access eventual AVG when stable     HTN  - oral meds    Anemia    TAMARA at missed HD and if holding HD would then give SC TIW to avoid missed doses    no obvios blood loss   no IV iron due to bacteremia   no obvious blood loss       Endocarditis w MRSA Bacteremia /Fungemia   HIV status negative   GRAHAM as above + RA vegetation - CT eval     d.w  Jaja Atkins  and  Sandy earlier today

## 2022-02-18 NOTE — DISCHARGE NOTE PROVIDER - NSDCMRMEDTOKEN_GEN_ALL_CORE_FT
acetaminophen 10 mg/mL intravenous solution: 100 milliliter(s) intravenous once, As needed, Temp greater or equal to 38.5C (101.3F)  acetaminophen 325 mg oral tablet: 2 tab(s) orally every 6 hours, As needed, Temp greater or equal to 38.5C (101.3F), Mild Pain (1 - 3)  albumin human:   albuterol 90 mcg/inh inhalation aerosol: 2 puff(s) inhaled every 6 hours, As needed, Shortness of Breath and/or Wheezing  collagenase 250 units/g topical ointment: 1 application topically once a day  epoetin amee:   fluconazole:   gabapentin 100 mg oral capsule: 1 cap(s) orally 3 times a day  hydroxychloroquine 200 mg oral tablet: 1 tab(s) orally once a day MDD:200 daily  levETIRAcetam 250 mg oral tablet: 1 tab(s) orally   levETIRAcetam 500 mg oral tablet: 1 tab(s) orally once a day  melatonin 3 mg oral tablet: 1 tab(s) orally once a day (at bedtime), As needed, Insomnia  metoprolol tartrate 50 mg oral tablet: 1 tab(s) orally 2 times a day  ocular lubricant ophthalmic solution: 1 drop(s) to each affected eye 2 times a day  pantoprazole 40 mg oral delayed release tablet: 1 tab(s) orally once a day (before a meal)  senna oral tablet: 2 tab(s) orally once a day (at bedtime), As needed, Constipation  sevelamer carbonate 800 mg oral tablet: 1 tab(s) orally 3 times a day (with meals)  vancomycin 500 mg intravenous injection: 500 milligram(s) intravenous    acetaminophen 10 mg/mL intravenous solution: 100 milliliter(s) intravenous once, As needed, Temp greater or equal to 38.5C (101.3F)  acetaminophen 325 mg oral tablet: 2 tab(s) orally every 6 hours, As needed, Temp greater or equal to 38.5C (101.3F), Mild Pain (1 - 3)  albumin human: 50 milliliter(s) intravenous prn, As Needed 25% in 50mL q1h prn sbp&lt;100 intradialysis  albuterol 90 mcg/inh inhalation aerosol: 2 puff(s) inhaled every 6 hours, As needed, Shortness of Breath and/or Wheezing  collagenase 250 units/g topical ointment: 1 application topically once a day  epoetin amee: 51262 unit(s) intravenous Tuesday, Thursday, Saturday  fluconazole: 200 milligram(s) intravenous every 24 hours  gabapentin 100 mg oral capsule: 1 cap(s) orally 3 times a day  hydroxychloroquine 200 mg oral tablet: 1 tab(s) orally once a day  levETIRAcetam 250 mg oral tablet: 1 tab(s) orally   levETIRAcetam 500 mg oral tablet: 1 tab(s) orally once a day  melatonin 3 mg oral tablet: 1 tab(s) orally once a day (at bedtime), As needed, Insomnia  metoprolol: 12.5 milligram(s) orally every 12 hours  ocular lubricant ophthalmic solution: 1 drop(s) to each affected eye 2 times a day  oxyCODONE 5 mg oral tablet: 1 tab(s) orally every 4 hours, As needed, Moderate Pain (4 - 6)  pantoprazole 40 mg oral delayed release tablet: 1 tab(s) orally once a day (before a meal)  senna oral tablet: 2 tab(s) orally once a day (at bedtime), As needed, Constipation  sevelamer carbonate 800 mg oral tablet: 1 tab(s) orally 3 times a day (with meals)  vancomycin 500 mg intravenous injection: 500 milligram(s) intravenous 3 times a week post dialysis   acetaminophen 10 mg/mL intravenous solution: 100 milliliter(s) intravenous once, As needed, Temp greater or equal to 38.5C (101.3F)  acetaminophen 325 mg oral tablet: 2 tab(s) orally every 6 hours, As needed, Temp greater or equal to 38.5C (101.3F), Mild Pain (1 - 3)  albumin human: 50 milliliter(s) intravenous prn, As Needed 25% in 50mL q1h prn sbp&lt;100 intradialysis  albuterol 90 mcg/inh inhalation aerosol: 2 puff(s) inhaled every 6 hours, As needed, Shortness of Breath and/or Wheezing  collagenase 250 units/g topical ointment: 1 application topically once a day  epoetin amee: 53598 unit(s) intravenous Tuesday, Thursday, Saturday  fluconazole: 200 milligram(s) intravenous every 24 hours  gabapentin 100 mg oral capsule: 1 cap(s) orally 3 times a day  hydroxychloroquine 200 mg oral tablet: 1 tab(s) orally once a day  levETIRAcetam 250 mg oral tablet: 1 tab(s) orally 3 times a week, MWF at 1600  levETIRAcetam 500 mg oral tablet: 1 tab(s) orally once a day  melatonin 3 mg oral tablet: 1 tab(s) orally once a day (at bedtime), As needed, Insomnia  metoprolol: 12.5 milligram(s) orally every 12 hours  ocular lubricant ophthalmic solution: 1 drop(s) to each affected eye 2 times a day  oxyCODONE 5 mg oral tablet: 1 tab(s) orally every 4 hours, As needed, Moderate Pain (4 - 6)  pantoprazole 40 mg oral delayed release tablet: 1 tab(s) orally once a day (before a meal)  senna oral tablet: 2 tab(s) orally once a day (at bedtime), As needed, Constipation  sevelamer carbonate 800 mg oral tablet: 1 tab(s) orally 3 times a day (with meals)  vancomycin 500 mg intravenous injection: 500 milligram(s) intravenous 3 times a week post dialysis

## 2022-02-18 NOTE — DISCHARGE NOTE PROVIDER - NSDCPNSUBOBJ_GEN_ALL_CORE
Labs:                     CBC 2/18:  7.19 > 8.8 / 26.9 < 249    BMP 2/18: Na 131  K 3.5  Cl 99  CO2 24  BUN 12  Cr 3.17  Glu 107  Ca 7.9 (WNL when corrected for albumin)  Phos 3.0     Coags 2/17: PT: 16.0 sec;   INR: 1.40 ratio;    PTT:27.4 sec    Lactate 10.0 (2/4) --> 2.1 (2/4) --> 6.1 (2/12) --> 0.9 (2.12)    Procalcitonin 385 (2/12)    Troponin 71.5, 78.0 (2/4)   proBNP 28,849 (2/4), was > 157,000 on 1/18/22     (2/4)    Micro:  Blood culture 2/17: In process  COVID19 PCR 2/16: Negative  HIV 1/2 2/15: Negative  Blood culture 2/15: No growth to date  COVID19 PCR 2/13: Negative  Cdiff PCR 2/12: Negative  Blood culture 2/12: No growth to date  Blood culture 2/11: C.albicans, C.tropicalis (azole susceptible)  COVID19 PCR 2/10: Negative  Blood culture 2/8: Negative  Blood culture 2/6: Negative  Blood culture 2/4: MRSA    Imaging:  CT chest WO 2/12: Near complete resolution of previously identified mucoid debris in bronchus intermedius and bilateral lower lobe bronchi with tree-in-bud nodularity, right greater than left. No consolidation or pleural effusion. Asymmetric right breast density with nonspecific soft tissue infiltration extending to right chest wall.    CT abd/pelvis W/ PO 2/12: Rectal tube identified, tip at the level of the rectosigmoid junction containing contrast. Mild wall thickening of the rectosigmoid junction. No bowel obstruction. Reidentified bulky retroperitoneal lymphadenopathy with cluster of multiple enlarged left paraaortic and pelvic lymph nodes, stable compared to recent prior study.    CT abd/pelvis WO 2/4: There is thrombus surrounding the catheter tip in the intrahepatic IVC. Bile ducts WNL. S/p cholecystectomy, Spleen borderline enlarged. Pancreas and adrenals WNL. Kidneys WNL. Bladder normal. Uterus and adnexa within normal limits. No bowel obstruction. Large amount of fluid/stool throughout the colon. Appendix is not visualized. No evidence of inflammation in the pericecal region. No ascites. Multiple enlarged retroperitoneal nodes. A left para-aortic node measures 1.9 x 1.4 cm (2:54). A left common iliac node measures 2.4 x 1.3 cm (2:75) Left sacral decubitus ulcer without definite evidence of osseous erosion. Degenerative changes of R hip.    Cardiac Testing:  GRAHAM 2/18:     Tele 2/16-17: Sinus tachycardia,     EKG 2/12: Sinus tachycardia,     TTE 2/8 : The left ventricle is normal in size. Moderately reduced left ventricular systolic function. Estimated left ventricular ejection fraction is 40%. Normal appearing right atrium. A catheter wire is seen in the right atrium. Normal aortic valve structure and function. Trivial aortic regurgitation is present. Normal appearing mitral valve structure and function. Mild (1+) mitral regurgitation is present.    Active Inpatient Medications:  MEDICATIONS  (STANDING):  carboxymethylcellulose 0.5% Ophthalmic Solution 1 Drop(s) Both EYES two times a day  chlorhexidine 4% Liquid 1 Application(s) Topical <User Schedule>  collagenase Ointment 1 Application(s) Topical daily  epoetin amee-epbx (RETACRIT) Injectable 41170 Unit(s) IV Push <User Schedule>  fluconAZOLE IVPB 200 milliGRAM(s) IV Intermittent every 24 hours  gabapentin 100 milliGRAM(s) Oral three times a day  hydroxychloroquine 200 milliGRAM(s) Oral daily  levETIRAcetam 500 milliGRAM(s) Oral daily  levETIRAcetam 250 milliGRAM(s) Oral <User Schedule>  metoprolol tartrate 12.5 milliGRAM(s) Oral two times a day  pantoprazole    Tablet 40 milliGRAM(s) Oral before breakfast  sevelamer carbonate 800 milliGRAM(s) Oral three times a day with meals  vancomycin  IVPB 500 milliGRAM(s) IV Intermittent <User Schedule>    MEDICATIONS  (PRN):  acetaminophen     Tablet .. 650 milliGRAM(s) Oral every 6 hours PRN Temp greater or equal to 38.5C (101.3F), Mild Pain (1 - 3)  acetaminophen   IVPB .. 1000 milliGRAM(s) IV Intermittent once PRN Temp greater or equal to 38.5C (101.3F)  albumin human 25% IVPB 50 milliLiter(s) IV Intermittent every 1 hour PRN SBP less than 100  ALBUTerol    90 MICROgram(s) HFA Inhaler 2 Puff(s) Inhalation every 6 hours PRN Shortness of Breath and/or Wheezing  melatonin 3 milliGRAM(s) Oral at bedtime PRN Insomnia  ondansetron Injectable 4 milliGRAM(s) IV Push every 8 hours PRN Nausea and/or Vomiting  oxycodone    5 mG Oral every 4 hours PRN moderate pain  senna 2 Tablet(s) Oral at bedtime PRN Constipation  sodium chloride 0.9% lock flush 10 milliLiter(s) IV Push every 1 hour PRN Pre/post blood products, medications, blood draw, and to maintain line patency     Labs:                     CBC 2/18:  7.19 > 8.8 / 26.9 < 249    BMP 2/18: Na 131  K 3.5  Cl 99  CO2 24  BUN 12  Cr 3.17  Glu 107  Ca 7.9 (WNL when corrected for albumin)  Phos 3.0     Coags 2/17: PT: 16.0 sec;   INR: 1.40 ratio;    PTT:27.4 sec    Lactate 10.0 (2/4) --> 2.1 (2/4) --> 6.1 (2/12) --> 0.9 (2.12)    Procalcitonin 385 (2/12)    Troponin 71.5, 78.0 (2/4)   proBNP 28,849 (2/4), was > 157,000 on 1/18/22     (2/4)    Micro:  Blood culture 2/17: In process  COVID19 PCR 2/16: Negative  HIV 1/2 2/15: Negative  Blood culture 2/15: No growth to date  COVID19 PCR 2/13: Negative  Cdiff PCR 2/12: Negative  Blood culture 2/12: No growth to date  Blood culture 2/11: C.albicans, C.tropicalis (azole susceptible)  COVID19 PCR 2/10: Negative  Blood culture 2/8: Negative  Blood culture 2/6: Negative  Blood culture 2/4: MRSA    Imaging:  CT chest WO 2/12: Near complete resolution of previously identified mucoid debris in bronchus intermedius and bilateral lower lobe bronchi with tree-in-bud nodularity, right greater than left. No consolidation or pleural effusion. Asymmetric right breast density with nonspecific soft tissue infiltration extending to right chest wall.    CT abd/pelvis W/ PO 2/12: Rectal tube identified, tip at the level of the rectosigmoid junction containing contrast. Mild wall thickening of the rectosigmoid junction. No bowel obstruction. Reidentified bulky retroperitoneal lymphadenopathy with cluster of multiple enlarged left paraaortic and pelvic lymph nodes, stable compared to recent prior study.    CT abd/pelvis WO 2/4: There is thrombus surrounding the catheter tip in the intrahepatic IVC. Bile ducts WNL. S/p cholecystectomy, Spleen borderline enlarged. Pancreas and adrenals WNL. Kidneys WNL. Bladder normal. Uterus and adnexa within normal limits. No bowel obstruction. Large amount of fluid/stool throughout the colon. Appendix is not visualized. No evidence of inflammation in the pericecal region. No ascites. Multiple enlarged retroperitoneal nodes. A left para-aortic node measures 1.9 x 1.4 cm (2:54). A left common iliac node measures 2.4 x 1.3 cm (2:75) Left sacral decubitus ulcer without definite evidence of osseous erosion. Degenerative changes of R hip.    Cardiac Testing:  GRAHAM 2/18:     Tele 2/16-17: Sinus tachycardia,     EKG 2/12: Sinus tachycardia,     TTE 2/8 : The left ventricle is normal in size. Moderately reduced left ventricular systolic function. Estimated left ventricular ejection fraction is 40%. Normal appearing right atrium. A catheter wire is seen in the right atrium. Normal aortic valve structure and function. Trivial aortic regurgitation is present. Normal appearing mitral valve structure and function. Mild (1+) mitral regurgitation is present.    Active Inpatient Medications:  MEDICATIONS  (STANDING):  carboxymethylcellulose 0.5% Ophthalmic Solution 1 Drop(s) Both EYES two times a day  collagenase Ointment 1 Application(s) Topical daily  epoetin amee-epbx (RETACRIT) Injectable 26598 Unit(s) IV Push <User Schedule>  fluconAZOLE IVPB 200 milliGRAM(s) IV Intermittent every 24 hours  gabapentin 100 milliGRAM(s) Oral three times a day  hydroxychloroquine 200 milliGRAM(s) Oral daily  levETIRAcetam 500 milliGRAM(s) Oral daily  levETIRAcetam 250 milliGRAM(s) Oral MWF 1600  metoprolol tartrate 12.5 milliGRAM(s) Oral two times a day  pantoprazole    Tablet 40 milliGRAM(s) Oral before breakfast  sevelamer carbonate 800 milliGRAM(s) Oral three times a day with meals  vancomycin  IVPB 500 milliGRAM(s) IV Intermittent post dialysis    MEDICATIONS  (PRN):  acetaminophen     Tablet .. 650 milliGRAM(s) Oral every 6 hours PRN Temp greater or equal to 38.5C (101.3F), Mild Pain (1 - 3)  acetaminophen   IVPB .. 1000 milliGRAM(s) IV Intermittent once PRN Temp greater or equal to 38.5C (101.3F)  albumin human 25% IVPB 50 milliLiter(s) IV Intermittent every 1 hour PRN SBP less than 100 intradialysis  ALBUTerol    90 MICROgram(s) HFA Inhaler 2 Puff(s) Inhalation every 6 hours PRN Shortness of Breath and/or Wheezing  diphenhydrAMINE 25 milliGRAM(s) Oral every 6 hours PRN Rash and/or Itching  loperamide 2 milliGRAM(s) Oral every 6 hours PRN Diarrhea  melatonin 3 milliGRAM(s) Oral at bedtime PRN Insomnia  ondansetron Injectable 4 milliGRAM(s) IV Push every 8 hours PRN Nausea and/or Vomiting  oxyCODONE    IR 5 milliGRAM(s) Oral every 4 hours PRN Moderate Pain (4 - 6)  senna 2 Tablet(s) Oral at bedtime PRN Constipation           Labs:                     CBC 2/18:  7.19 > 8.8 / 26.9 < 249    BMP 2/18: Na 131  K 3.5  Cl 99  CO2 24  BUN 12  Cr 3.17  Glu 107  Ca 7.9 (WNL when corrected for albumin)  Phos 3.0     Coags 2/17: PT: 16.0 sec;   INR: 1.40 ratio;    PTT:27.4 sec    Lactate 10.0 (2/4) --> 2.1 (2/4) --> 6.1 (2/12) --> 0.9 (2.12)    Procalcitonin 385 (2/12)    Troponin 71.5, 78.0 (2/4)   proBNP 28,849 (2/4), was > 157,000 on 1/18/22     (2/4)    Micro:  Blood culture 2/17: In process  COVID19 PCR 2/16: Negative  HIV 1/2 2/15: Negative  Blood culture 2/15: No growth to date  COVID19 PCR 2/13: Negative  Cdiff PCR 2/12: Negative  Blood culture 2/12: No growth to date  Blood culture 2/11: C.albicans, C.tropicalis (azole susceptible)  COVID19 PCR 2/10: Negative  Blood culture 2/8: Negative  Blood culture 2/6: Negative  Blood culture 2/4: MRSA    Imaging:  CT chest WO 2/12: Near complete resolution of previously identified mucoid debris in bronchus intermedius and bilateral lower lobe bronchi with tree-in-bud nodularity, right greater than left. No consolidation or pleural effusion. Asymmetric right breast density with nonspecific soft tissue infiltration extending to right chest wall.    CT abd/pelvis W/ PO 2/12: Rectal tube identified, tip at the level of the rectosigmoid junction containing contrast. Mild wall thickening of the rectosigmoid junction. No bowel obstruction. Reidentified bulky retroperitoneal lymphadenopathy with cluster of multiple enlarged left paraaortic and pelvic lymph nodes, stable compared to recent prior study.    CT abd/pelvis WO 2/4: There is thrombus surrounding the catheter tip in the intrahepatic IVC. Bile ducts WNL. S/p cholecystectomy, Spleen borderline enlarged. Pancreas and adrenals WNL. Kidneys WNL. Bladder normal. Uterus and adnexa within normal limits. No bowel obstruction. Large amount of fluid/stool throughout the colon. Appendix is not visualized. No evidence of inflammation in the pericecal region. No ascites. Multiple enlarged retroperitoneal nodes. A left para-aortic node measures 1.9 x 1.4 cm (2:54). A left common iliac node measures 2.4 x 1.3 cm (2:75) Left sacral decubitus ulcer without definite evidence of osseous erosion. Degenerative changes of R hip.    Cardiac Testing:  GRAHAM 2/18: Report pending. Informed by Cardiology of significant vegetation finding    Tele 2/16-17: Sinus tachycardia,     EKG 2/12: Sinus tachycardia,     TTE 2/8 : The left ventricle is normal in size. Moderately reduced left ventricular systolic function. Estimated left ventricular ejection fraction is 40%. Normal appearing right atrium. A catheter wire is seen in the right atrium. Normal aortic valve structure and function. Trivial aortic regurgitation is present. Normal appearing mitral valve structure and function. Mild (1+) mitral regurgitation is present.    Active Inpatient Medications:  MEDICATIONS  (STANDING):  carboxymethylcellulose 0.5% Ophthalmic Solution 1 Drop(s) Both EYES two times a day  collagenase Ointment 1 Application(s) Topical daily  epoetin amee-epbx (RETACRIT) Injectable 73907 Unit(s) IV Push <User Schedule>  fluconAZOLE IVPB 200 milliGRAM(s) IV Intermittent every 24 hours  gabapentin 100 milliGRAM(s) Oral three times a day  hydroxychloroquine 200 milliGRAM(s) Oral daily  levETIRAcetam 500 milliGRAM(s) Oral daily  levETIRAcetam 250 milliGRAM(s) Oral MWF 1600  metoprolol tartrate 12.5 milliGRAM(s) Oral two times a day  pantoprazole    Tablet 40 milliGRAM(s) Oral before breakfast  sevelamer carbonate 800 milliGRAM(s) Oral three times a day with meals  vancomycin  IVPB 500 milliGRAM(s) IV Intermittent post dialysis    MEDICATIONS  (PRN):  acetaminophen     Tablet .. 650 milliGRAM(s) Oral every 6 hours PRN Temp greater or equal to 38.5C (101.3F), Mild Pain (1 - 3)  acetaminophen   IVPB .. 1000 milliGRAM(s) IV Intermittent once PRN Temp greater or equal to 38.5C (101.3F)  albumin human 25% IVPB 50 milliLiter(s) IV Intermittent every 1 hour PRN SBP less than 100 intradialysis  ALBUTerol    90 MICROgram(s) HFA Inhaler 2 Puff(s) Inhalation every 6 hours PRN Shortness of Breath and/or Wheezing  diphenhydrAMINE 25 milliGRAM(s) Oral every 6 hours PRN Rash and/or Itching  loperamide 2 milliGRAM(s) Oral every 6 hours PRN Diarrhea  melatonin 3 milliGRAM(s) Oral at bedtime PRN Insomnia  ondansetron Injectable 4 milliGRAM(s) IV Push every 8 hours PRN Nausea and/or Vomiting  oxyCODONE    IR 5 milliGRAM(s) Oral every 4 hours PRN Moderate Pain (4 - 6)  senna 2 Tablet(s) Oral at bedtime PRN Constipation

## 2022-02-18 NOTE — PROCEDURAL SAFETY CHECKLIST WITH OR WITHOUT SEDATION - NSPOSTCOMMENTFT_GEN_ALL_CORE
Outpatient bedside GRAHAM/CV performed. All safety equipment in place and ready at bedside. Brief/Time out performed at bedside with all team members present @1025a, anesthesia start time @1027a, probe in @a, no bubbles administered, probe out @1041a. no CVV performed. Anesthesia stop time @1050a.  Dr Delgado discussed findings with patient. Will continue to monitor until transferred by back to unit

## 2022-02-18 NOTE — PROGRESS NOTE ADULT - SUBJECTIVE AND OBJECTIVE BOX
Patient is a 41y Female who reports no complaints as new    * pt seen earlier today on HD     fevers today    s/p GRAHAM today AM         MEDICATIONS  (STANDING):  carboxymethylcellulose 0.5% Ophthalmic Solution 1 Drop(s) Both EYES two times a day  chlorhexidine 4% Liquid 1 Application(s) Topical <User Schedule>  collagenase Ointment 1 Application(s) Topical daily  epoetin amee-epbx (RETACRIT) Injectable 52759 Unit(s) IV Push <User Schedule>  fluconAZOLE IVPB 200 milliGRAM(s) IV Intermittent every 24 hours  gabapentin 100 milliGRAM(s) Oral three times a day  hydroxychloroquine 200 milliGRAM(s) Oral daily  levETIRAcetam 500 milliGRAM(s) Oral daily  levETIRAcetam 250 milliGRAM(s) Oral <User Schedule>  metoprolol tartrate 12.5 milliGRAM(s) Oral two times a day  pantoprazole    Tablet 40 milliGRAM(s) Oral before breakfast  sevelamer carbonate 800 milliGRAM(s) Oral three times a day with meals  vancomycin  IVPB 500 milliGRAM(s) IV Intermittent <User Schedule>      Vital Signs Last 24 Hrs  T(C): 36.6 (18 Feb 2022 10:25), Max: 38.1 (17 Feb 2022 19:52)  T(F): 97.9 (18 Feb 2022 10:25), Max: 100.6 (17 Feb 2022 19:52)  HR: 92 (18 Feb 2022 11:10) (92 - 124)  BP: 118/79 (18 Feb 2022 11:00) (117/78 - 157/92)  BP(mean): --  RR: 20 (18 Feb 2022 11:10) (19 - 22)  SpO2: 99% (18 Feb 2022 11:10) (93% - 100%)    I&O's Detail    17 Feb 2022 07:01  -  18 Feb 2022 07:00  --------------------------------------------------------  IN:    PRBCs (Packed Red Blood Cells): 337 mL  Total IN: 337 mL    OUT:    Other (mL): 2337 mL  Total OUT: 2337 mL    Total NET: -2000 mL      PHYSICAL EXAM:    Constitutional:frail, ill appearing  HEENT: dry MM  dist  Cardiovascular: S1 and S2  Extremities: No peripheral edema  Neurological: awake on HD   hd cath      LABS:                                            8.8    7.19  )-----------( 249      ( 18 Feb 2022 08:11 )             26.9                         7.3    9.86  )-----------( 269      ( 17 Feb 2022 07:35 )             22.6     131    |  99     |  12     ----------------------------<  107       18 Feb 2022 08:11  3.5     |  24     |  3.17     130    |  97     |  19     ----------------------------<  88        17 Feb 2022 07:35  3.2     |  23     |  4.33     131    |  97     |  28     ----------------------------<  91        15 Feb 2022 10:52  3.0     |  23     |  5.21     Ca    7.9        18 Feb 2022 08:11  Ca    7.8        17 Feb 2022 07:35    Phos  4.0       15 Feb 2022 10:52      TPro  x      /  Alb  1.6    /  TBili  x      /        15 Feb 2022 10:52  DBili  x      /  AST  x      /  ALT  x      /  AlkPhos  x            Urine Studies:          RADIOLOGY & ADDITIONAL STUDIES:

## 2022-02-18 NOTE — PROGRESS NOTE ADULT - PROVIDER SPECIALTY LIST ADULT
Hospitalist
Nephrology
Hospitalist
Infectious Disease
Vascular Surgery
Vascular Surgery
Hospitalist
Hospitalist
Infectious Disease
Nephrology
Vascular Surgery
Hospitalist
Infectious Disease
Nephrology
Palliative Care
Vascular Surgery
Hospitalist
Critical Care
Critical Care

## 2022-02-18 NOTE — DISCHARGE NOTE NURSING/CASE MANAGEMENT/SOCIAL WORK - PATIENT PORTAL LINK FT
You can access the FollowMyHealth Patient Portal offered by [facility] by registering at the following website: [email or web address]. By joining [facility]’s FollowMyHealth portal, you will also be able to view your health information using other applications (apps) compatible with our system.

## 2022-02-18 NOTE — DISCHARGE NOTE PROVIDER - NSDCCPCAREPLAN_GEN_ALL_CORE_FT
PRINCIPAL DISCHARGE DIAGNOSIS  Diagnosis: Sepsis with organ dysfunction  Assessment and Plan of Treatment: Sepsis with organ dysfunction due to MRSA bacteremia, present upon admission, unable to determine if PC source of her IV drug use disorder as source, now C.albicans and C.tropicalis in blood culture as well, new finding cardiac vegetation. In setting of IV drug use disorder, HD permcath. MRSA on blood culture 2/4 for which she was placed on IV vancomycin. Dialysis catheter was removed 2/7 when cultures were reported. TTE done 2/8 negative for vegetations. R IJ Shiley since placed 2/11. Despite IV vancomycin post dialysis and removal of permcath, was noted to have continued fevers. Repeat blood cultures 2/11 ultimately grew C.albicans and C.tropicalis. Started on IV caspofungin 2/12. Repeat cultures 2/12 and 2/15 no growth to date. Switched to PO Diflucan 2/16. Patient now with fevers once again. Recognize that Shiley needs to be removed. However, after lengthy discussion with Nephrology and ID, given patient's past and current issues with vascular access, risk for stenosis / fibrosis to vessels with repeated line changes, difficulty getting labs without such access, decision made not to remove Shiley and instead draw blood cultures from Shiley after dialysis 2/17. Ordered GRAHAM to r/o endocarditis. GRAHAM today notable for vegetation. Now planning transfer to [facility].    - F/u official GRAHAM report  - Continue IV vancomycin post dialysis  - Continue PO Diflucan 200mg daily  - F/u Bld Cxs in process  - Monitor for fever, maintain MAP > 65  - Opthalmology eval requested, awaiting call back from specialist      SECONDARY DISCHARGE DIAGNOSES  Diagnosis: Complication of vascular dialysis catheter  Assessment and Plan of Treatment: Dialysis catheter tip thrombus. Catheter since removed due to MRSA bacteremia and sepsis. On Eliquis.  - Continue Eliquis but held for possible procedure

## 2022-02-18 NOTE — PROGRESS NOTE ADULT - SUBJECTIVE AND OBJECTIVE BOX
Date of service: 02-18-22 @ 14:39      Patient lying in bed; events noted; had GRAHAM found to have vegetation on the atrial septum; still with persistent fevers      ROS unable to obtain secondary to patient medical condition     MEDICATIONS  (STANDING):  carboxymethylcellulose 0.5% Ophthalmic Solution 1 Drop(s) Both EYES two times a day  chlorhexidine 4% Liquid 1 Application(s) Topical <User Schedule>  collagenase Ointment 1 Application(s) Topical daily  epoetin amee-epbx (RETACRIT) Injectable 83162 Unit(s) IV Push <User Schedule>  fluconAZOLE IVPB 200 milliGRAM(s) IV Intermittent every 24 hours  gabapentin 100 milliGRAM(s) Oral three times a day  hydroxychloroquine 200 milliGRAM(s) Oral daily  levETIRAcetam 500 milliGRAM(s) Oral daily  levETIRAcetam 250 milliGRAM(s) Oral <User Schedule>  metoprolol tartrate 12.5 milliGRAM(s) Oral two times a day  pantoprazole    Tablet 40 milliGRAM(s) Oral before breakfast  sevelamer carbonate 800 milliGRAM(s) Oral three times a day with meals  vancomycin  IVPB 500 milliGRAM(s) IV Intermittent <User Schedule>    MEDICATIONS  (PRN):  acetaminophen     Tablet .. 650 milliGRAM(s) Oral every 6 hours PRN Temp greater or equal to 38.5C (101.3F), Mild Pain (1 - 3)  acetaminophen   IVPB .. 1000 milliGRAM(s) IV Intermittent once PRN Temp greater or equal to 38.5C (101.3F)  albumin human 25% IVPB 50 milliLiter(s) IV Intermittent every 1 hour PRN SBP less than 100  ALBUTerol    90 MICROgram(s) HFA Inhaler 2 Puff(s) Inhalation every 6 hours PRN Shortness of Breath and/or Wheezing  diphenhydrAMINE 25 milliGRAM(s) Oral every 6 hours PRN Rash and/or Itching  loperamide 2 milliGRAM(s) Oral every 6 hours PRN Diarrhea  melatonin 3 milliGRAM(s) Oral at bedtime PRN Insomnia  ondansetron Injectable 4 milliGRAM(s) IV Push every 8 hours PRN Nausea and/or Vomiting  oxyCODONE    IR 5 milliGRAM(s) Oral every 4 hours PRN Moderate Pain (4 - 6)  senna 2 Tablet(s) Oral at bedtime PRN Constipation  sodium chloride 0.9% lock flush 10 milliLiter(s) IV Push every 1 hour PRN Pre/post blood products, medications, blood draw, and to maintain line patency      Vital Signs Last 24 Hrs  T(C): 36.6 (18 Feb 2022 10:25), Max: 38.8 (17 Feb 2022 16:34)  T(F): 97.9 (18 Feb 2022 10:25), Max: 101.8 (17 Feb 2022 16:34)  HR: 92 (18 Feb 2022 11:10) (92 - 124)  BP: 118/79 (18 Feb 2022 11:00) (117/78 - 157/92)  BP(mean): --  RR: 20 (18 Feb 2022 11:10) (19 - 22)  SpO2: 99% (18 Feb 2022 11:10) (93% - 100%)        Physical Exam:          Physical exam:  Constitutional: frail looking  HEENT: NC/AT, EOMI, PERRLA, conjunctivae clear; ears and nose atraumatic; poor dentition  Neck: supple; thyroid not palpable, right neck shiley in place  Shiley cath present  Back: no tenderness  Respiratory: respiratory effort normal; clear to auscultation  Cardiovascular: S1S2 regular, no murmurs  Abdomen: soft, not tender, not distended, positive BS; liver and spleen WNL  Genitourinary: no suprapubic tenderness  Musculoskeletal: no muscle tenderness, no joint swelling or tenderness  Extremities: no pedal edema  Neurological/ Psychiatric: AxOx3, Judgement and insight normal;  moving all extremities  Skin: no rashes; no palpable lesions, stage IV sacral ulcer wound clean no drainage    Labs: reviewed                 Labs:                        8.8    7.19  )-----------( 249      ( 18 Feb 2022 08:11 )             26.9     02-18    131<L>  |  99  |  12  ----------------------------<  107<H>  3.5   |  24  |  3.17<H>    Ca    7.9<L>      18 Feb 2022 08:11             Cultures:       Culture - Blood (collected 02-15-22 @ 08:07)  Source: .Blood None  Preliminary Report (02-16-22 @ 12:01):    No growth to date.    Culture - Blood (collected 02-15-22 @ 08:03)  Source: .Blood None  Preliminary Report (02-16-22 @ 12:01):    No growth to date.    Culture - Blood (collected 02-12-22 @ 21:59)  Source: .Blood None  Final Report (02-18-22 @ 11:00):    No Growth Final    Culture - Blood (collected 02-12-22 @ 21:59)  Source: .Blood None  Final Report (02-18-22 @ 11:00):    No Growth Final    Culture - Fungal, Blood (collected 02-12-22 @ 12:00)  Source: .Blood BLOOD  Preliminary Report (02-14-22 @ 09:36):    Testing in progress                      Radiology: all available radiological tests reviewed        Questionable mild hyperenhancement of the urethra. Correlate with   urinalysis.    Findings were discussed with Dr. Epperson at 4:37 pm on 2/4/2022.        PROCEDURE DATE:  02/04/2022          INTERPRETATION:  CLINICAL INFORMATION: Infected decubitus ulcer    COMPARISON: 1/21/2022, 1/18/2022.    CONTRAST/COMPLICATIONS:  IV Contrast: Omnipaque 350  90 cc administered   10 cc discarded  Oral Contrast: NONE  Complications: None reported at time of study completion    PROCEDURE:  CT of the Abdomen and Pelvis was performed.  Sagittal and coronal reformats were performed.    FINDINGS:  LOWER CHEST: Within normal limits.    LIVER: There is thrombus surrounding the catheter tip in the intrahepatic   IVC.  BILE DUCTS: Normal caliber.  GALLBLADDER: Cholecystectomy.  SPLEEN: Borderline enlarged.  PANCREAS: Within normal limits.  ADRENALS: Within normal limits.  KIDNEYS/URETERS: Within normal limits.    BLADDER: Within normal limits. Question mild hyperenhancement of the   urethra.  REPRODUCTIVE ORGANS: Uterus and adnexa within normal limits.    BOWEL: No bowel obstruction. Large amount of fluid/stool throughout the   colon. Question mild thickening versus underdistention of the sigmoid   colon. Appendix is not visualized. No evidence of inflammation in the   pericecal region.  PERITONEUM: No ascites.  VESSELS: Within the intrahepatic IVC, as described above.  RETROPERITONEUM/LYMPH NODES: Multiple enlarged retroperitoneal nodes. . A   left para-aortic node measures 1.9 x 1.4 cm (2:54). A left common iliac   node measures 2.4 x 1.3 cm (2:75)  ABDOMINAL WALL: Left sacral decubitus ulcer without definite evidence of   osseous erosion.  BONES: Degenerative changes. Degenerative changes of the right hip.    IMPRESSION:  There is thrombus surrounding the catheter tip in the intrahepatic IVC.    Retroperitoneal adenopathy, as described above. Differential includes   lymphoma.    Mildly distended, fluid-filled colon. Mild thickening versus   underdistention of the sigmoid colon.    Left sacral decubitus ulcer without definite osseous erosion. Correlate   with MRI if clinically warranted.    Additional findings as above.            < from: TTE Echo Complete w/o Contrast w/ Doppler (02.08.22 @ 09:40) >     Impression     Summary     The left ventricle is normal in size.   Moderately reduced left ventricular systolic function.   Estimated left ventricular ejection fraction is 40%.   Normal appearing right atrium.   A catheter wire is seen in the right atrium.   Normal aortic valve structure and function.   Trivial aortic regurgitation is present.   Normal appearing mitral valve structure and function.   Mild (1+) mitral regurgitation is present.      < end of copied text >          Advanced directives addressed: full resuscitation

## 2022-02-18 NOTE — DISCHARGE NOTE PROVIDER - CARE PROVIDER_API CALL
Blake Pena)  Internal Medicine; Nephrology  [address] [place] [address]  Phone: [phone]  Fax: [phone]  Follow Up Time: 2 weeks

## 2022-02-18 NOTE — PROCEDURE NOTE - ADDITIONAL PROCEDURE DETAILS
GRAHAM Procedure Note  Indication: Bacteremia, rule out endocarditis    The patient was brought to the transesophageal echo laboratory. Informed consent was obtained. The patient was seen by the anesthesiologist for MAC anesthesia.   The transesophageal probe was introduced into the posterior pharynx and esophagus without difficulty. Transesophageal images were then obtained.  The patient tolerated the procedure well.  There were no procedural complications.  The patient was transferred to the recovery area in a stable condition.    Findings: There is a very mobile, moderate sized, multilobulated echogenic structure attached to the right atrial septum consistent with endocarditis.  Left ventricular ejection fraction 45%.  Mild mitral valve regurgitation.  Trace aortic insufficiency.    Recommendations: Given evidence of endocarditis, recommend transfer to tertiary care center for evaluation of cardiomyopathy coronary angiogram and CT surgery evaluation given persistent bacteremia and fevers despite adequate antibiotic/antifungal therapy.
Mildine, powerglide  Procedure donnissa under dynamic US guidance throughout
Done under dynamic US guidance throughout  Image obtained

## 2022-02-18 NOTE — DISCHARGE NOTE PROVIDER - CARE PROVIDERS DIRECT ADDRESSES
[email or web address]

## 2022-02-18 NOTE — PROGRESS NOTE ADULT - ASSESSMENT
42 yo female w/PMH of ESRD (M/W/F), GERD, bacteremia, seizures, epilepsy, depression, COPD, HTN, heroin abuse, RA, Cdiff colitis in Dec 2021 smoker, recent admission for Aspiration Pna who  presents to [place]  ED via ambulance from dialysis center for brief loss of consciousness, questionable seizure vs syncope.   EMS reported she had a 15 sec generalized seizure and was AAOx4 afterwards. Pt states she was having shivering and not having a seizure.  She reported that her normal seizures involve her  whole body, unlike day of admit.  She c/o  night sweats last night and chills this morning. Day of admit she had  1.5 hours of hemodialysis instead of her usual HD.   Pt denies cpain/SOB, no cough or resp complaints, no  abd pain.  She reported having a loose stool and vomiting x1 and two episodes day prior to admit. She has urine once a day and denies dysuria. Here noted with MRSA in blood cx. Has perm-catheter in place. Hx of substance abuse.    1. Fungemia. Possible disseminated candidemia. MRSA sepsis/bacteremia. Catheter-related infection. IVC thrombus. hx substance abuse. ESRD  - c albicans, c tropicalis growing in blood cx from 2/11   - s/p perm-catheter removal 2/7 and shiley catheter placed  - on vancomycin 500mg post HD #14  - s/p caspofungin 50mg daily #4 - change to diflucan 400mg x 1 then 200mg daily   - repeat blood cx 2/6, 2/8, 2/12 no growth so far  - TTE no obvious vegetations; found on GRAHAM to have vegetation on atrial septum  - will be transferred to [facility] for cardiothoracic evaluation  - wound care eval for sacral decub - wound clean  - continue with antibiotics/antifungals  - after further discussion, had one episode of fever, have decided to not remove shiley, will draw blood cultures from the shiley after dialysis--- done on 2/17  - opthalmology eval --- was not done, due to acute events  - monitor temps  - tolerating abx well so far; no side effects noted  - iv access is so difficult, that is why this special situation was made with exemption to maintain the shiley and see if the blood is clear    2. other issues - care per medicine

## 2022-02-18 NOTE — DISCHARGE NOTE PROVIDER - REASON FOR ADMISSION
Episode of loss of consciousness, fever Fever  Seizure  Sacral Decubitus  IVC Tip Thrombus  Abn CT Ab

## 2022-02-18 NOTE — DISCHARGE NOTE NURSING/CASE MANAGEMENT/SOCIAL WORK - NSDCPEFALRISK_GEN_ALL_CORE
For information on Fall & Injury Prevention, visit: [email or web address] OR  [email or web address] OR  https://www.cdc.gov/steadi/patient.html

## 2022-02-19 ENCOUNTER — INPATIENT (INPATIENT)
Facility: HOSPITAL | Age: 42
LOS: 5 days | Discharge: LEFT BEFORE DISCHARGE | DRG: 720 | End: 2022-02-25
Attending: HOSPITALIST | Admitting: FAMILY MEDICINE
Payer: MEDICAID

## 2022-02-19 ENCOUNTER — TRANSCRIPTION ENCOUNTER (OUTPATIENT)
Age: 42
End: 2022-02-19

## 2022-02-19 VITALS
TEMPERATURE: 98 F | WEIGHT: 96.56 LBS | DIASTOLIC BLOOD PRESSURE: 86 MMHG | RESPIRATION RATE: 18 BRPM | HEART RATE: 95 BPM | OXYGEN SATURATION: 99 % | SYSTOLIC BLOOD PRESSURE: 156 MMHG

## 2022-02-19 VITALS
SYSTOLIC BLOOD PRESSURE: 118 MMHG | RESPIRATION RATE: 17 BRPM | HEART RATE: 91 BPM | DIASTOLIC BLOOD PRESSURE: 60 MMHG | OXYGEN SATURATION: 97 %

## 2022-02-19 DIAGNOSIS — R59.0 LOCALIZED ENLARGED LYMPH NODES: ICD-10-CM

## 2022-02-19 DIAGNOSIS — I51.3 INTRACARDIAC THROMBOSIS, NOT ELSEWHERE CLASSIFIED: ICD-10-CM

## 2022-02-19 DIAGNOSIS — N18.6 END STAGE RENAL DISEASE: ICD-10-CM

## 2022-02-19 DIAGNOSIS — K80.20 CALCULUS OF GALLBLADDER WITHOUT CHOLECYSTITIS WITHOUT OBSTRUCTION: Chronic | ICD-10-CM

## 2022-02-19 DIAGNOSIS — G40.909 EPILEPSY, UNSPECIFIED, NOT INTRACTABLE, WITHOUT STATUS EPILEPTICUS: ICD-10-CM

## 2022-02-19 DIAGNOSIS — R92.8 OTHER ABNORMAL AND INCONCLUSIVE FINDINGS ON DIAGNOSTIC IMAGING OF BREAST: ICD-10-CM

## 2022-02-19 DIAGNOSIS — R09.89 OTHER SPECIFIED SYMPTOMS AND SIGNS INVOLVING THE CIRCULATORY AND RESPIRATORY SYSTEMS: ICD-10-CM

## 2022-02-19 DIAGNOSIS — F19.10 OTHER PSYCHOACTIVE SUBSTANCE ABUSE, UNCOMPLICATED: ICD-10-CM

## 2022-02-19 DIAGNOSIS — M06.9 RHEUMATOID ARTHRITIS, UNSPECIFIED: ICD-10-CM

## 2022-02-19 DIAGNOSIS — E87.1 HYPO-OSMOLALITY AND HYPONATREMIA: ICD-10-CM

## 2022-02-19 DIAGNOSIS — I82.220 ACUTE EMBOLISM AND THROMBOSIS OF INFERIOR VENA CAVA: ICD-10-CM

## 2022-02-19 DIAGNOSIS — I38 ENDOCARDITIS, VALVE UNSPECIFIED: ICD-10-CM

## 2022-02-19 DIAGNOSIS — Z98.51 TUBAL LIGATION STATUS: Chronic | ICD-10-CM

## 2022-02-19 DIAGNOSIS — L89.154 PRESSURE ULCER OF SACRAL REGION, STAGE 4: ICD-10-CM

## 2022-02-19 DIAGNOSIS — Z87.19 PERSONAL HISTORY OF OTHER DISEASES OF THE DIGESTIVE SYSTEM: Chronic | ICD-10-CM

## 2022-02-19 DIAGNOSIS — I50.23 ACUTE ON CHRONIC SYSTOLIC (CONGESTIVE) HEART FAILURE: ICD-10-CM

## 2022-02-19 DIAGNOSIS — B49 UNSPECIFIED MYCOSIS: ICD-10-CM

## 2022-02-19 DIAGNOSIS — L98.429 NON-PRESSURE CHRONIC ULCER OF BACK WITH UNSPECIFIED SEVERITY: ICD-10-CM

## 2022-02-19 DIAGNOSIS — R78.81 BACTEREMIA: ICD-10-CM

## 2022-02-19 DIAGNOSIS — E43 UNSPECIFIED SEVERE PROTEIN-CALORIE MALNUTRITION: ICD-10-CM

## 2022-02-19 LAB
-  STAPHYLOCOCCUS EPIDERMIDIS, METHICILLIN RESISTANT: SIGNIFICANT CHANGE UP
A1C WITH ESTIMATED AVERAGE GLUCOSE RESULT: 5 % — SIGNIFICANT CHANGE UP (ref 4–5.6)
ALBUMIN SERPL ELPH-MCNC: 2.6 G/DL — LOW (ref 3.3–5.2)
ALP SERPL-CCNC: 94 U/L — SIGNIFICANT CHANGE UP (ref 40–120)
ALT FLD-CCNC: <5 U/L — SIGNIFICANT CHANGE UP
ANION GAP SERPL CALC-SCNC: 15 MMOL/L — SIGNIFICANT CHANGE UP (ref 5–17)
ANISOCYTOSIS BLD QL: SLIGHT — SIGNIFICANT CHANGE UP
APTT BLD: 31.1 SEC — SIGNIFICANT CHANGE UP (ref 27.5–35.5)
AST SERPL-CCNC: 7 U/L — SIGNIFICANT CHANGE UP
BASOPHILS # BLD AUTO: 0.07 K/UL — SIGNIFICANT CHANGE UP (ref 0–0.2)
BASOPHILS NFR BLD AUTO: 0.9 % — SIGNIFICANT CHANGE UP (ref 0–2)
BILIRUB SERPL-MCNC: 0.3 MG/DL — LOW (ref 0.4–2)
BLD GP AB SCN SERPL QL: SIGNIFICANT CHANGE UP
BUN SERPL-MCNC: 19.2 MG/DL — SIGNIFICANT CHANGE UP (ref 8–20)
CALCIUM SERPL-MCNC: 7.9 MG/DL — LOW (ref 8.6–10.2)
CHLORIDE SERPL-SCNC: 93 MMOL/L — LOW (ref 98–107)
CO2 SERPL-SCNC: 21 MMOL/L — LOW (ref 22–29)
CREAT SERPL-MCNC: 4 MG/DL — HIGH (ref 0.5–1.3)
CULTURE RESULTS: SIGNIFICANT CHANGE UP
EOSINOPHIL # BLD AUTO: 0 K/UL — SIGNIFICANT CHANGE UP (ref 0–0.5)
EOSINOPHIL NFR BLD AUTO: 0 % — SIGNIFICANT CHANGE UP (ref 0–6)
ESTIMATED AVERAGE GLUCOSE: 97 MG/DL — SIGNIFICANT CHANGE UP (ref 68–114)
GIANT PLATELETS BLD QL SMEAR: PRESENT — SIGNIFICANT CHANGE UP
GLUCOSE SERPL-MCNC: 93 MG/DL — SIGNIFICANT CHANGE UP (ref 70–99)
GRAM STN FLD: SIGNIFICANT CHANGE UP
HAV IGM SER-ACNC: SIGNIFICANT CHANGE UP
HBV CORE IGM SER-ACNC: SIGNIFICANT CHANGE UP
HBV SURFACE AG SER-ACNC: SIGNIFICANT CHANGE UP
HCG SERPL QL: NEGATIVE — SIGNIFICANT CHANGE UP
HCT VFR BLD CALC: 23.8 % — LOW (ref 34.5–45)
HCV AB S/CO SERPL IA: 0.3 S/CO — SIGNIFICANT CHANGE UP (ref 0–0.99)
HCV AB SERPL-IMP: SIGNIFICANT CHANGE UP
HGB BLD-MCNC: 8 G/DL — LOW (ref 11.5–15.5)
INR BLD: 1.28 RATIO — HIGH (ref 0.88–1.16)
LYMPHOCYTES # BLD AUTO: 0.07 K/UL — LOW (ref 1–3.3)
LYMPHOCYTES # BLD AUTO: 0.9 % — LOW (ref 13–44)
MACROCYTES BLD QL: SLIGHT — SIGNIFICANT CHANGE UP
MAGNESIUM SERPL-MCNC: 1.7 MG/DL — LOW (ref 1.8–2.6)
MANUAL SMEAR VERIFICATION: SIGNIFICANT CHANGE UP
MCHC RBC-ENTMCNC: 28.9 PG — SIGNIFICANT CHANGE UP (ref 27–34)
MCHC RBC-ENTMCNC: 33.6 GM/DL — SIGNIFICANT CHANGE UP (ref 32–36)
MCV RBC AUTO: 85.9 FL — SIGNIFICANT CHANGE UP (ref 80–100)
METHOD TYPE: SIGNIFICANT CHANGE UP
MICROCYTES BLD QL: SLIGHT — SIGNIFICANT CHANGE UP
MONOCYTES # BLD AUTO: 0.62 K/UL — SIGNIFICANT CHANGE UP (ref 0–0.9)
MONOCYTES NFR BLD AUTO: 7.9 % — SIGNIFICANT CHANGE UP (ref 2–14)
MRSA PCR RESULT.: DETECTED
MYELOCYTES NFR BLD: 0.9 % — HIGH (ref 0–0)
NEUTROPHILS # BLD AUTO: 7.06 K/UL — SIGNIFICANT CHANGE UP (ref 1.8–7.4)
NEUTROPHILS NFR BLD AUTO: 89.4 % — HIGH (ref 43–77)
NT-PROBNP SERPL-SCNC: HIGH PG/ML (ref 0–300)
PA ADP PRP-ACNC: 344 PRU — SIGNIFICANT CHANGE UP (ref 180–376)
PLAT MORPH BLD: NORMAL — SIGNIFICANT CHANGE UP
PLATELET # BLD AUTO: 225 K/UL — SIGNIFICANT CHANGE UP (ref 150–400)
POLYCHROMASIA BLD QL SMEAR: SLIGHT — SIGNIFICANT CHANGE UP
POTASSIUM SERPL-MCNC: 3.8 MMOL/L — SIGNIFICANT CHANGE UP (ref 3.5–5.3)
POTASSIUM SERPL-SCNC: 3.8 MMOL/L — SIGNIFICANT CHANGE UP (ref 3.5–5.3)
PREALB SERPL-MCNC: 9 MG/DL — LOW (ref 18–38)
PROT SERPL-MCNC: 5.6 G/DL — LOW (ref 6.6–8.7)
PROTHROM AB SERPL-ACNC: 14.7 SEC — HIGH (ref 10.6–13.6)
RBC # BLD: 2.77 M/UL — LOW (ref 3.8–5.2)
RBC # FLD: 17.2 % — HIGH (ref 10.3–14.5)
RBC BLD AUTO: ABNORMAL
S AUREUS DNA NOSE QL NAA+PROBE: DETECTED
SARS-COV-2 RNA SPEC QL NAA+PROBE: SIGNIFICANT CHANGE UP
SODIUM SERPL-SCNC: 128 MMOL/L — LOW (ref 135–145)
SPECIMEN SOURCE: SIGNIFICANT CHANGE UP
T3 SERPL-MCNC: 81 NG/DL — SIGNIFICANT CHANGE UP (ref 80–200)
T4 AB SER-ACNC: 5.9 UG/DL — SIGNIFICANT CHANGE UP (ref 4.5–12)
TSH SERPL-MCNC: 7.57 UIU/ML — HIGH (ref 0.27–4.2)
WBC # BLD: 7.9 K/UL — SIGNIFICANT CHANGE UP (ref 3.8–10.5)
WBC # FLD AUTO: 7.9 K/UL — SIGNIFICANT CHANGE UP (ref 3.8–10.5)

## 2022-02-19 PROCEDURE — 99223 1ST HOSP IP/OBS HIGH 75: CPT

## 2022-02-19 PROCEDURE — 36415 COLL VENOUS BLD VENIPUNCTURE: CPT

## 2022-02-19 PROCEDURE — 83735 ASSAY OF MAGNESIUM: CPT

## 2022-02-19 PROCEDURE — 80074 ACUTE HEPATITIS PANEL: CPT

## 2022-02-19 PROCEDURE — 85610 PROTHROMBIN TIME: CPT

## 2022-02-19 PROCEDURE — 84100 ASSAY OF PHOSPHORUS: CPT

## 2022-02-19 PROCEDURE — 85025 COMPLETE CBC W/AUTO DIFF WBC: CPT

## 2022-02-19 PROCEDURE — 87040 BLOOD CULTURE FOR BACTERIA: CPT

## 2022-02-19 PROCEDURE — 87640 STAPH A DNA AMP PROBE: CPT

## 2022-02-19 PROCEDURE — 86850 RBC ANTIBODY SCREEN: CPT

## 2022-02-19 PROCEDURE — 80202 ASSAY OF VANCOMYCIN: CPT

## 2022-02-19 PROCEDURE — 80053 COMPREHEN METABOLIC PANEL: CPT

## 2022-02-19 PROCEDURE — 99223 1ST HOSP IP/OBS HIGH 75: CPT | Mod: 25

## 2022-02-19 PROCEDURE — 99222 1ST HOSP IP/OBS MODERATE 55: CPT

## 2022-02-19 PROCEDURE — 87641 MR-STAPH DNA AMP PROBE: CPT

## 2022-02-19 PROCEDURE — 84436 ASSAY OF TOTAL THYROXINE: CPT

## 2022-02-19 PROCEDURE — 85730 THROMBOPLASTIN TIME PARTIAL: CPT

## 2022-02-19 PROCEDURE — 97163 PT EVAL HIGH COMPLEX 45 MIN: CPT | Mod: GP

## 2022-02-19 PROCEDURE — 87077 CULTURE AEROBIC IDENTIFY: CPT

## 2022-02-19 PROCEDURE — 84443 ASSAY THYROID STIM HORMONE: CPT

## 2022-02-19 PROCEDURE — U0003: CPT

## 2022-02-19 PROCEDURE — 84703 CHORIONIC GONADOTROPIN ASSAY: CPT

## 2022-02-19 PROCEDURE — 71045 X-RAY EXAM CHEST 1 VIEW: CPT

## 2022-02-19 PROCEDURE — 93010 ELECTROCARDIOGRAM REPORT: CPT

## 2022-02-19 PROCEDURE — 93880 EXTRACRANIAL BILAT STUDY: CPT

## 2022-02-19 PROCEDURE — 86900 BLOOD TYPING SEROLOGIC ABO: CPT

## 2022-02-19 PROCEDURE — 81001 URINALYSIS AUTO W/SCOPE: CPT

## 2022-02-19 PROCEDURE — U0005: CPT

## 2022-02-19 PROCEDURE — 84134 ASSAY OF PREALBUMIN: CPT

## 2022-02-19 PROCEDURE — 93005 ELECTROCARDIOGRAM TRACING: CPT

## 2022-02-19 PROCEDURE — 90937 HEMODIALYSIS REPEATED EVAL: CPT

## 2022-02-19 PROCEDURE — 84480 ASSAY TRIIODOTHYRONINE (T3): CPT

## 2022-02-19 PROCEDURE — C8929: CPT

## 2022-02-19 PROCEDURE — 85576 BLOOD PLATELET AGGREGATION: CPT

## 2022-02-19 PROCEDURE — 86901 BLOOD TYPING SEROLOGIC RH(D): CPT

## 2022-02-19 PROCEDURE — 80048 BASIC METABOLIC PNL TOTAL CA: CPT

## 2022-02-19 PROCEDURE — 99261: CPT

## 2022-02-19 PROCEDURE — 83880 ASSAY OF NATRIURETIC PEPTIDE: CPT

## 2022-02-19 PROCEDURE — 85027 COMPLETE CBC AUTOMATED: CPT

## 2022-02-19 PROCEDURE — 87150 DNA/RNA AMPLIFIED PROBE: CPT

## 2022-02-19 PROCEDURE — 83036 HEMOGLOBIN GLYCOSYLATED A1C: CPT

## 2022-02-19 PROCEDURE — 99233 SBSQ HOSP IP/OBS HIGH 50: CPT

## 2022-02-19 RX ORDER — FUROSEMIDE 40 MG
40 TABLET ORAL
Refills: 0 | Status: DISCONTINUED | OUTPATIENT
Start: 2022-02-19 | End: 2022-02-19

## 2022-02-19 RX ORDER — LEVETIRACETAM 250 MG/1
250 TABLET, FILM COATED ORAL
Refills: 0 | Status: DISCONTINUED | OUTPATIENT
Start: 2022-02-19 | End: 2022-02-19

## 2022-02-19 RX ORDER — METOPROLOL TARTRATE 50 MG
1 TABLET ORAL
Qty: 0 | Refills: 0 | DISCHARGE
Start: 2022-02-19

## 2022-02-19 RX ORDER — FUROSEMIDE 40 MG
40 TABLET ORAL
Qty: 0 | Refills: 0 | DISCHARGE
Start: 2022-02-19

## 2022-02-19 RX ORDER — COLLAGENASE CLOSTRIDIUM HIST. 250 UNIT/G
1 OINTMENT (GRAM) TOPICAL DAILY
Refills: 0 | Status: DISCONTINUED | OUTPATIENT
Start: 2022-02-19 | End: 2022-02-25

## 2022-02-19 RX ORDER — CHLORHEXIDINE GLUCONATE 213 G/1000ML
1 SOLUTION TOPICAL DAILY
Refills: 0 | Status: DISCONTINUED | OUTPATIENT
Start: 2022-02-19 | End: 2022-02-25

## 2022-02-19 RX ORDER — APIXABAN 2.5 MG/1
2.5 TABLET, FILM COATED ORAL
Refills: 0 | Status: DISCONTINUED | OUTPATIENT
Start: 2022-02-19 | End: 2022-02-19

## 2022-02-19 RX ORDER — CHLORHEXIDINE GLUCONATE 213 G/1000ML
1 SOLUTION TOPICAL DAILY
Refills: 0 | Status: DISCONTINUED | OUTPATIENT
Start: 2022-02-19 | End: 2022-02-19

## 2022-02-19 RX ORDER — LEVETIRACETAM 250 MG/1
250 TABLET, FILM COATED ORAL
Refills: 0 | Status: DISCONTINUED | OUTPATIENT
Start: 2022-02-19 | End: 2022-02-25

## 2022-02-19 RX ORDER — ERYTHROPOIETIN 10000 [IU]/ML
10000 INJECTION, SOLUTION INTRAVENOUS; SUBCUTANEOUS
Refills: 0 | Status: DISCONTINUED | OUTPATIENT
Start: 2022-02-19 | End: 2022-02-19

## 2022-02-19 RX ORDER — LANOLIN ALCOHOL/MO/W.PET/CERES
3 CREAM (GRAM) TOPICAL AT BEDTIME
Refills: 0 | Status: DISCONTINUED | OUTPATIENT
Start: 2022-02-19 | End: 2022-02-25

## 2022-02-19 RX ORDER — HEPARIN SODIUM 5000 [USP'U]/ML
5000 INJECTION INTRAVENOUS; SUBCUTANEOUS EVERY 12 HOURS
Refills: 0 | Status: DISCONTINUED | OUTPATIENT
Start: 2022-02-19 | End: 2022-02-19

## 2022-02-19 RX ORDER — METOPROLOL TARTRATE 50 MG
50 TABLET ORAL
Refills: 0 | Status: DISCONTINUED | OUTPATIENT
Start: 2022-02-19 | End: 2022-02-19

## 2022-02-19 RX ORDER — OXYCODONE HYDROCHLORIDE 5 MG/1
5 TABLET ORAL EVERY 4 HOURS
Refills: 0 | Status: DISCONTINUED | OUTPATIENT
Start: 2022-02-19 | End: 2022-02-25

## 2022-02-19 RX ORDER — ONDANSETRON 8 MG/1
4 TABLET, FILM COATED ORAL EVERY 8 HOURS
Refills: 0 | Status: DISCONTINUED | OUTPATIENT
Start: 2022-02-19 | End: 2022-02-25

## 2022-02-19 RX ORDER — ALBUTEROL 90 UG/1
2 AEROSOL, METERED ORAL EVERY 6 HOURS
Refills: 0 | Status: DISCONTINUED | OUTPATIENT
Start: 2022-02-19 | End: 2022-02-25

## 2022-02-19 RX ORDER — APIXABAN 2.5 MG/1
1 TABLET, FILM COATED ORAL
Qty: 0 | Refills: 0 | DISCHARGE
Start: 2022-02-19

## 2022-02-19 RX ORDER — HYDROXYCHLOROQUINE SULFATE 200 MG
200 TABLET ORAL DAILY
Refills: 0 | Status: DISCONTINUED | OUTPATIENT
Start: 2022-02-19 | End: 2022-02-25

## 2022-02-19 RX ORDER — FLUCONAZOLE 150 MG/1
200 TABLET ORAL DAILY
Refills: 0 | Status: DISCONTINUED | OUTPATIENT
Start: 2022-02-19 | End: 2022-02-24

## 2022-02-19 RX ORDER — FUROSEMIDE 40 MG
40 TABLET ORAL
Refills: 0 | Status: DISCONTINUED | OUTPATIENT
Start: 2022-02-19 | End: 2022-02-23

## 2022-02-19 RX ORDER — VANCOMYCIN HCL 1 G
500 VIAL (EA) INTRAVENOUS ONCE
Refills: 0 | Status: COMPLETED | OUTPATIENT
Start: 2022-02-19 | End: 2022-02-19

## 2022-02-19 RX ORDER — GABAPENTIN 400 MG/1
100 CAPSULE ORAL THREE TIMES A DAY
Refills: 0 | Status: DISCONTINUED | OUTPATIENT
Start: 2022-02-19 | End: 2022-02-25

## 2022-02-19 RX ORDER — PANTOPRAZOLE SODIUM 20 MG/1
40 TABLET, DELAYED RELEASE ORAL DAILY
Refills: 0 | Status: DISCONTINUED | OUTPATIENT
Start: 2022-02-19 | End: 2022-02-25

## 2022-02-19 RX ORDER — APIXABAN 2.5 MG/1
2.5 TABLET, FILM COATED ORAL
Refills: 0 | Status: DISCONTINUED | OUTPATIENT
Start: 2022-02-19 | End: 2022-02-25

## 2022-02-19 RX ORDER — SENNA PLUS 8.6 MG/1
2 TABLET ORAL AT BEDTIME
Refills: 0 | Status: DISCONTINUED | OUTPATIENT
Start: 2022-02-19 | End: 2022-02-25

## 2022-02-19 RX ORDER — LEVETIRACETAM 250 MG/1
500 TABLET, FILM COATED ORAL DAILY
Refills: 0 | Status: DISCONTINUED | OUTPATIENT
Start: 2022-02-19 | End: 2022-02-25

## 2022-02-19 RX ORDER — SEVELAMER CARBONATE 2400 MG/1
800 POWDER, FOR SUSPENSION ORAL
Refills: 0 | Status: DISCONTINUED | OUTPATIENT
Start: 2022-02-19 | End: 2022-02-25

## 2022-02-19 RX ORDER — ACETAMINOPHEN 500 MG
650 TABLET ORAL EVERY 6 HOURS
Refills: 0 | Status: DISCONTINUED | OUTPATIENT
Start: 2022-02-19 | End: 2022-02-25

## 2022-02-19 RX ORDER — METOPROLOL TARTRATE 50 MG
50 TABLET ORAL
Refills: 0 | Status: DISCONTINUED | OUTPATIENT
Start: 2022-02-19 | End: 2022-02-25

## 2022-02-19 RX ADMIN — Medication 650 MILLIGRAM(S): at 00:49

## 2022-02-19 RX ADMIN — Medication 1 APPLICATION(S): at 08:00

## 2022-02-19 RX ADMIN — SEVELAMER CARBONATE 800 MILLIGRAM(S): 2400 POWDER, FOR SUSPENSION ORAL at 12:13

## 2022-02-19 RX ADMIN — APIXABAN 2.5 MILLIGRAM(S): 2.5 TABLET, FILM COATED ORAL at 14:16

## 2022-02-19 RX ADMIN — SEVELAMER CARBONATE 800 MILLIGRAM(S): 2400 POWDER, FOR SUSPENSION ORAL at 21:22

## 2022-02-19 RX ADMIN — Medication 100 MILLIGRAM(S): at 14:16

## 2022-02-19 RX ADMIN — ERYTHROPOIETIN 10000 UNIT(S): 10000 INJECTION, SOLUTION INTRAVENOUS; SUBCUTANEOUS at 12:43

## 2022-02-19 RX ADMIN — Medication 3 MILLIGRAM(S): at 21:21

## 2022-02-19 RX ADMIN — Medication 650 MILLIGRAM(S): at 03:10

## 2022-02-19 RX ADMIN — FLUCONAZOLE 100 MILLIGRAM(S): 150 TABLET ORAL at 05:27

## 2022-02-19 RX ADMIN — OXYCODONE HYDROCHLORIDE 5 MILLIGRAM(S): 5 TABLET ORAL at 14:14

## 2022-02-19 RX ADMIN — OXYCODONE HYDROCHLORIDE 5 MILLIGRAM(S): 5 TABLET ORAL at 06:49

## 2022-02-19 RX ADMIN — OXYCODONE HYDROCHLORIDE 5 MILLIGRAM(S): 5 TABLET ORAL at 22:20

## 2022-02-19 RX ADMIN — Medication 1 DROP(S): at 05:43

## 2022-02-19 RX ADMIN — SODIUM CHLORIDE 3 MILLILITER(S): 9 INJECTION INTRAMUSCULAR; INTRAVENOUS; SUBCUTANEOUS at 06:57

## 2022-02-19 RX ADMIN — PANTOPRAZOLE SODIUM 40 MILLIGRAM(S): 20 TABLET, DELAYED RELEASE ORAL at 05:46

## 2022-02-19 RX ADMIN — OXYCODONE HYDROCHLORIDE 5 MILLIGRAM(S): 5 TABLET ORAL at 21:21

## 2022-02-19 RX ADMIN — SODIUM CHLORIDE 3 MILLILITER(S): 9 INJECTION INTRAMUSCULAR; INTRAVENOUS; SUBCUTANEOUS at 12:32

## 2022-02-19 RX ADMIN — CHLORHEXIDINE GLUCONATE 15 MILLILITER(S): 213 SOLUTION TOPICAL at 05:42

## 2022-02-19 RX ADMIN — Medication 50 MILLIGRAM(S): at 21:22

## 2022-02-19 RX ADMIN — CHLORHEXIDINE GLUCONATE 1 APPLICATION(S): 213 SOLUTION TOPICAL at 05:28

## 2022-02-19 RX ADMIN — LEVETIRACETAM 500 MILLIGRAM(S): 250 TABLET, FILM COATED ORAL at 12:13

## 2022-02-19 RX ADMIN — Medication 3 MILLIGRAM(S): at 04:27

## 2022-02-19 RX ADMIN — Medication 12.5 MILLIGRAM(S): at 05:42

## 2022-02-19 RX ADMIN — CHLORHEXIDINE GLUCONATE 1 APPLICATION(S): 213 SOLUTION TOPICAL at 12:14

## 2022-02-19 RX ADMIN — APIXABAN 2.5 MILLIGRAM(S): 2.5 TABLET, FILM COATED ORAL at 21:22

## 2022-02-19 RX ADMIN — Medication 200 MILLIGRAM(S): at 12:14

## 2022-02-19 RX ADMIN — OXYCODONE HYDROCHLORIDE 5 MILLIGRAM(S): 5 TABLET ORAL at 07:30

## 2022-02-19 RX ADMIN — OXYCODONE HYDROCHLORIDE 5 MILLIGRAM(S): 5 TABLET ORAL at 15:14

## 2022-02-19 NOTE — H&P ADULT - PROBLEM SELECTOR PLAN 2
MRSA bacteremia 2/4, remains on IV Vanco. Subsequent blood cultures negative until 2/17/22 with blood cultures now positive for Methicillin resistant staph epidermis.  ID consult to be called this AM.

## 2022-02-19 NOTE — H&P ADULT - NSHPPHYSICALEXAM_GEN_ALL_CORE
Vital Signs Last 24 Hrs  T(C): 36.4 (19 Feb 2022 17:40), Max: 37.6 (19 Feb 2022 00:45)  T(F): 97.6 (19 Feb 2022 17:40), Max: 99.7 (19 Feb 2022 00:45)  HR: 95 (19 Feb 2022 17:40) (79 - 145)  BP: 156/86 (19 Feb 2022 17:40) (114/76 - 162/100)  BP(mean): 83 (19 Feb 2022 16:00) (83 - 119)  RR: 18 (19 Feb 2022 17:40) (14 - 26)  SpO2: 99% (19 Feb 2022 17:40) (95% - 100%)

## 2022-02-19 NOTE — DISCHARGE NOTE NURSING/CASE MANAGEMENT/SOCIAL WORK - NSDCPEELIQUISCOMP_GEN_ALL_CORE
Apixaban/Eliquis is used to treat and prevent blood clots. If you are not able to swallow the tablets whole, they may be crushed and mixed in water, apple juice, or applesauce and promptly taken within four hours. Never skip a dose of Apixaban/Eliquis. If you forget to take your Apixaban/Eliquis, take a dose as soon as you remember. If it is almost time for your next Apixaban/Eliquis dose, wait until then and take a regular dose. DO NOT take an extra pill to ‘catch up’.  NEVER TAKE A DOUBLE DOSE. Notify your doctor that you missed a dose. Take Apixaban/Eliquis at the same time each morning and evening. Apixaban/Eliquis may be taken with other medication or food.
MD

## 2022-02-19 NOTE — PATIENT PROFILE ADULT - NSPROHMSYMPCOND_GEN_A_NUR
Today's Plan of Care:  -Provided home exercises. Please do as indicated.  -Brace as needed for comfort and support  -Ice or ice massage 15-20 minutes for pain relief or swelling as needed  -Patient's preferred over the counter medication as directed on packaging as needed for pain or soreness.  -Elevate the knee as much as possible above the heart  -Referral to an Orthopedic Surgeon. Scheduling will reach out to you.       Follow Up: As needed if symptoms fail to improve or worsen. Please call with any questions or concerns.       
cardiovascular/genitourinary/hematologic/musculoskeletal

## 2022-02-19 NOTE — H&P ADULT - PROBLEM SELECTOR PLAN 8
S/p hospitalization 12/15-12/18/21 for seizures (CSF negative).  Continue Keppra.   Consider neuro consult this AM.

## 2022-02-19 NOTE — CONSULT NOTE ADULT - SUBJECTIVE AND OBJECTIVE BOX
[facility]  INFECTIOUS DISEASES AND INTERNAL MEDICINE at [place] and [place]  =======================================================                               Vel Mcmahon MD#  Ángel Fletcher MD*                                     Nakia Medina MD*    Jodi Tavares MD*            Diplomates American Board of Internal Medicine & Infectious Diseases                # [place] Office - Appt - Tel  [phone] Fax [phone]                * [place] Office - Appt - Tel [phone] Fax [phone]                                  Hospital Consult line:  [phone]  =======================================================      [id number]  DEMIAN MONISHA    CC: Patient is a 41y old  Female who presents with a chief complaint of Right atrial thrombus (19 Feb 2022 09:04)      41y  Female former smoker (1/4PPD, quit 12/2021) with a medical history of Lupus, RA, ANCA vasculitis with ESRD on HD (M/W/F), GERD, seizures, epilepsy, depression, COPD, HTN, substance abuse (marijuana, cocaine), IVDA (heroin), C diff colitis 12/2021, bacteremia, fungemia, resp failure s/p Trach/PEG 7/13/20 (since reversed), stage 4 sacral decub since 2021, multiple episodes of seizures & syncope with HD, s/p hospitalization 12/15-12/18/21 for seizures (CSF negative), hospitalized at [facility] 1/18/22-2/2/22 after found unresponsive at home after being left alone for an hour, diagnosed with Aspiration PNA requiring intubation, LLL infiltrate s/p 7 day course of Vanco/Zosyn, discharged 2/2/22 and readmitted to [facility] 2/4/22 after LOC during HD. Hospital course complicated by MRSA bacteremia 2/4 (treated with IV Vanco), dialysis catheter tip thrombus noted with permacath removed and RIJ Shiley placed 2/11, C. abicans and C. tropicalis fungemia 2/11 (IV Caspofungin X 4 days transitioned to IV Diflucan 2/16). 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 Patient transferred [facility] to be rejected for cardiac surgery. ID input requested.       Past Medical & Surgical Hx:  Rheumatoid arthritis  H/O Raynauds syndrome  Heroin abuse  Smoker  Sepsis  HTN (hypertension)  COPD (chronic obstructive pulmonary disease)  Depression  Epilepsy  GERD (gastroesophageal reflux disease)  Bacteremia  Systemic lupus erythematosus  Aphonia  H/O tubal ligation  H/O appendicitis  Gall bladder stones  H/O tracheostomy  S/P percutaneous endoscopic gastrostomy (PEG) tube placement      Social Hx:  Former smoker   + Cocaine + Marijuana       FAMILY HISTORY:  Family history of cardiomyopathy (Mother)      Allergies  morphine (Rash)       REVIEW OF SYSTEMS:  CONSTITUTIONAL:  No Fever. +chills  HEENT:  No diplopia or blurred vision.  No earache, sore throat or runny nose.  CARDIOVASCULAR:  No pressure, squeezing, strangling, tightness, heaviness or aching about the chest, neck, axilla or epigastrium.  RESPIRATORY:  No cough, shortness of breath  GASTROINTESTINAL:  No nausea, vomiting or diarrhea.  GENITOURINARY:  No dysuria, frequency or urgency.   MUSCULOSKELETAL:  no joint aches, no muscle pain  SKIN:  No change in skin, hair or nails.  NEUROLOGIC:  No Headaches, seizures   PSYCHIATRIC:  No disorder of thought or mood.  ENDOCRINE:  No heat or cold intolerance  HEMATOLOGICAL:  No easy bruising or bleeding.       Physical Exam:  GEN: NAD  HEENT: normocephalic and atraumatic. EOMI. PERRL.    NECK: Supple.   LUNGS: CTA B/L.  HEART: RRR  ABDOMEN: Soft, NT, ND.  +BS.    : No CVA tenderness  EXTREMITIES: Without  edema.  MSK: No joint swelling  NEUROLOGIC: No Focal Deficits   PSYCHIATRIC: Appropriate affect .  SKIN: No rash      Height (cm): 152.4 (02-19 @ 02:00)  Weight (kg): 57.7 (02-19 @ 02:00)  BMI (kg/m2): 24.8 (02-19 @ 02:00)  BSA (m2): 1.54 (02-19 @ 02:00)      Vitals:  T(F): 97.6 (19 Feb 2022 08:43), Max: 99.7 (19 Feb 2022 00:45)  HR: 93 (19 Feb 2022 06:56)  BP: 136/98 (19 Feb 2022 05:03)  RR: 19 (19 Feb 2022 06:56)  SpO2: 95% (19 Feb 2022 06:56) (95% - 100%)  temp max in last 48H T(F): , Max: 101.8 (02-17-22 @ 16:34)      Current Antibiotics:  fluconAZOLE IVPB 200 milliGRAM(s) IV Intermittent every 24 hours  hydroxychloroquine 200 milliGRAM(s) Oral daily  vancomycin  IVPB 500 milliGRAM(s) IV Intermittent once    Other medications:  artificial tears (preservative free) Ophthalmic Solution 1 Drop(s) Both EYES two times a day  chlorhexidine 2% Cloths 1 Application(s) Topical daily  collagenase Ointment 1 Application(s) Topical daily  epoetin amee-epbx (RETACRIT) Injectable 54904 Unit(s) IV Push <User Schedule>  gabapentin 100 milliGRAM(s) Oral three times a day  heparin   Injectable 5000 Unit(s) SubCutaneous every 12 hours  levETIRAcetam 500 milliGRAM(s) Oral daily  levETIRAcetam 250 milliGRAM(s) Oral <User Schedule>  metoprolol tartrate 12.5 milliGRAM(s) Oral two times a day  pantoprazole    Tablet 40 milliGRAM(s) Oral before breakfast  sevelamer carbonate 800 milliGRAM(s) Oral three times a day with meals  sodium chloride 0.9% lock flush 3 milliLiter(s) IV Push every 8 hours                 8.0    7.90  )-----------( 225      ( 19 Feb 2022 04:23 )             23.8     02-19    128<L>  |  93<L>  |  19.2  ----------------------------<  93  3.8   |  21.0<L>  |  4.00<H>    Ca    7.9<L>      19 Feb 2022 04:23  Mg     1.7     02-19    TPro  5.6<L>  /  Alb  2.6<L>  /  TBili  0.3<L>  /  DBili  x   /  AST  7   /  ALT  <5  /  AlkPhos  94  02-19    RECENT CULTURES:  02-17 @ 11:37 .Blood None Blood Culture PCR    No growth to date.  Growth in aerobic bottle: Gram Positive Cocci in Clusters    02-15 @ 08:07 .Blood None     No growth to date.    02-15 @ 08:03 .Blood None     No growth to date.    02-12 @ 21:59 .Blood None     No Growth Final    02-12 @ 12:00 .Blood BLOOD     Testing in progress    02-11 @ 08:08 .Blood None Blood Culture PCR  Candida albicans  Candida tropicalis    Growth in aerobic bottle: Candida albicans  Growth in aerobic bottle: Candida tropicalis  ***Blood Panel PCR results on this specimen are available  approximately 3 hours after the Gram stain result.***  Gram stain, PCR, and/or culture results may not always  correspond due to difference in methodologies.  ************************************************************  This PCR assay was performed by multiplex PCR. This  Assay tests for 66 bacterial and resistance gene targets.  Please refer to the [facility] test directory  at [email or web address] for details.  Growth in aerobic bottle: Yeast like cells    02-08 @ 09:22 .Blood None     No Growth Final    02-06 @ 11:51 .Blood None     No Growth Final      WBC Count: 7.90 K/uL (02-19-22 @ 04:23)  WBC Count: 7.19 K/uL (02-18-22 @ 08:11)  WBC Count: 9.86 K/uL (02-17-22 @ 07:35)  WBC Count: 8.35 K/uL (02-16-22 @ 07:45)  WBC Count: 10.86 K/uL (02-15-22 @ 08:07)    Creatinine, Serum: 4.00 mg/dL (02-19-22 @ 04:23)  Creatinine, Serum: 3.17 mg/dL (02-18-22 @ 08:11)  Creatinine, Serum: 4.33 mg/dL (02-17-22 @ 07:35)  Creatinine, Serum: 5.21 mg/dL (02-15-22 @ 10:52)    Procalcitonin, Serum: 384.80 ng/mL (02-12-22 @ 10:50)     COVID-19 PCR: NotDetec (02-16-22 @ 14:25)  COVID-19 PCR: NotDetec (02-13-22 @ 14:15)  COVID-19 PCR: NotDetec (02-10-22 @ 17:10)  SARS-CoV-2: NotDetec (02-04-22 @ 14:06)  COVID-19 PCR: NotDetec (01-31-22 @ 12:32)  COVID-19 PCR: NotDetec (01-25-22 @ 22:00)      < from: TTE Echo Complete w/ Contrast w/ Doppler (02.18.22 @ 21:27) >  Summary:   1. Left ventricular ejection fraction, by visual estimation, is 20 to   25%.   2. Severely decreased global left ventricular systolic function.   3. Mildly reduced RV systolic function.   4. Catheter seen in the right atrium with a highly mobile echogenicity   visualized.   5. There is no evidence of pericardial effusion.   6. Mild thickening of the anterior and posterior mitral valve leaflets.   7. Moderate mitral valve regurgitation.   8. Mild buckling of the posterior mitral valve leaflet.   9. Moderate-severe tricuspid regurgitation.  10. Questionable small mobile echodensity visualized on the aortic valve.  11. Mild to moderate pulmonic valve regurgitation.  12. Estimated pulmonary artery systolic pressure is 39.8 mmHg assuming a   right atrial pressure of 8 mmHg, which is consistent with borderline   pulmonary hypertension.  13. Mass or thrombus seen inthe inferior vena cava measuring 3.9 x 1.5 x   1.4 cm.  14. Discussed with the Floor CT Surgery PA on 2/18/2022.  15. Recommendations: GRAHAM for further evaluation of Valvular disease and   echo densities noted on the echo.    < end of copied text >        < from: Xray Chest 1 View AP/PA. (02.18.22 @ 20:48) >  ACC: [id number] EXAM:  XR CHEST AP OR PA 1V                          PROCEDURE DATE:  02/18/2022      INTERPRETATION:  Portable chest radiograph    CLINICAL INFORMATION: Dyspnea, shortness of breath.    TECHNIQUE:  Portable  AP chest radiograph.    COMPARISON: 2/12/2022 chest .    FINDINGS:  CATHETERS AND TUBES: RIGHT IJ catheter tip in SVC.    PULMONARY: The visualized lungs are clear of airspace consolidations or   effusions.   No pneumothorax.    HEART/VASCULAR: The heart and mediastinum size and configuration are   within normal limits.    BONES: Visualized osseous structures are intact.    IMPRESSION: RIGHT IJ catheter tip in SVC.  No radiographic evidence of active chest disease.    --- End of Report ---  < end of copied text >

## 2022-02-19 NOTE — H&P ADULT - ASSESSMENT
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 diff colitis 12/2021, bacteremia, fungemia, resp failure s/p Trach/PEG 7/13/20 (since reversed), stage 4 sacral decub since 2021, multiple episodes of seizures & syncope with HD, s/p hospitalization 12/15-12/18/21 for seizures (CSF negative), hospitalized at [facility] 1/18/22-2/2/22 after found unresponsive at home after being left alone for an hour, diagnosed with Aspiration PNA requiring intubation, LLL infiltrate s/p 7 day course of Vanco/Zosyn, discharged 2/2/22 and readmitted to [facility] 2/4/22 after LOC during HD. Hospital course complicated by MRSA bacteremia 2/4 (treated with IV Vanco), dialysis catheter tip thrombus noted with permacath removed and RIJ Shiley placed 2/11, C.abicans and C. tropicalis fungemia 2/11 (IV Caspofungin X 4 days transitioned to IV Diflucan 2/16). 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 Transferred to [facility] for CT surgery evaluation.  TTE at [facility] revealed LVEF 20-25%, Severely decreased global left ventricular systolic function, catheter seen in the right atrium with a highly mobile echogenicity visualized on aortic valve, thrombus, an mass or thrombus seen in the inferior vena cava measuring 3.9 x 1.5 x 1.4 cm. No intervention was planned per CT surgery. As per CTS the patient requires further Heme/Onc evaluation as she underwent a CT A/P at , which revealed an abnormal breast density and extensive lymphadenopathy. Additionally the patient will need eventual ischemic workup for decreased LVEF. 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 diff colitis 12/2021, bacteremia, fungemia, resp failure s/p Trach/PEG 7/13/20 (since reversed), stage 4 sacral decub since 2021, multiple episodes of seizures & syncope with HD, s/p hospitalization 12/15-12/18/21 for seizures (CSF negative), hospitalized at [facility] 1/18/22-2/2/22 after found unresponsive at home after being left alone for an hour, diagnosed with Aspiration PNA requiring intubation, LLL infiltrate s/p 7 day course of Vanco/Zosyn, discharged 2/2/22 and readmitted to [facility] 2/4/22 after LOC during HD. Hospital course complicated by MRSA bacteremia 2/4 (treated with IV Vanco), dialysis catheter tip thrombus noted with permacath removed and RIJ Shiley placed 2/11, C.abicans and C. tropicalis fungemia 2/11 (IV Caspofungin X 4 days transitioned to IV Diflucan 2/16). 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 Transferred to [facility] for CT surgery evaluation.  TTE at [facility] revealed LVEF 20-25%, Severely decreased global left ventricular systolic function, catheter seen in the right atrium with a highly mobile echogenicity visualized on aortic valve, thrombus, an mass or thrombus seen in the inferior vena cava measuring 3.9 x 1.5 x 1.4 cm. No intervention was planned per CT surgery. As per CTS the patient requires further Heme/Onc evaluation as she underwent a CT A/P at , which revealed an abnormal breast density and extensive lymphadenopathy. Additionally the patient will need eventual ischemic workup for decreased LVEF.    #Right atrial thrombus.  ~as per [facility] => TTE revealed an EF 20-25%, 2 mobile right atrial masses, moderate MR, moderate TR, a 3.9 x 1.5 x 1.4 cm IVC thrombus   ~as per [facility] the patient was on Eliquis at [facility] for clot at the tip of the previous dialysis cath. Eliquis held since 2/16/22.   ~will continue Eliquis as no surgical intervention planned at this time     # Sepsis- POA   # MRSA bacteremia,   # C. albicans and C. 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  ~In setting of IV drug use disorder, HD permacath was removed 2/7.   ~R IJ Shiley since placed 2/11.  ~Now on IV vancomycin (TIW) post dialysis   ~Repeat blood cultures 2/11 ultimately grew C. albicans and C. tropicalis. Started on IV caspofungin 2/12.   Repeat cultures 2/12 and 2/15 no growth to date. Switched to PO Diflucan 2/16.   ~Shiley catheter ultimately needs to be removed however given patient's past and current issues with vascular access, risk for stenosis / fibrosis to vessels with repeated line changes, difficulty getting labs without such access, decision was made not to remove Shiley and instead draw blood cultures from Shiley after dialysis    ~cont. Diflucan 200mg po daily  ~as per [facility] 1 set of Blood C/S sent on 2/19 at [facility]- testing  ~Opthalmology eval was requested at  (never done?)       Problem/Plan - 4:  ·  Problem: Acute on chronic systolic congestive heart failure.   ·  Plan: EF previously 40-45% on prior TTEs, now with an EF of 20-25% on TTE 2/18.   Continue supportive medications.     Problem/Plan - 5:  ·  Problem: Hyponatremia.   ·  Plan: Trend BMP.   Nephrology consult to be called this AM.     Problem/Plan - 6:  ·  Problem: ESRD with anemia.   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  Currently has RIJ Shiley placed on 2/11 since previous permacath has a clot at the tip and patient had persistent bacteremia. Need to consider line change.   Blood transfusion with HD PRN (last PRBC given with HD 2/17).   Trend H/H.  Continue retacrit.  Nephrology consult to be called this AM.  Continue to save Right arm (no BP cuffs, IVs or blood draws) as able with plan for eventual AVF creation.     Problem/Plan - 7:  ·  Problem: Decubitus ulcer of sacral region, stage 4.   ·  Plan: Continue collegenase.   Continue offloading.   Wound care consult this AM.     Problem/Plan - 8:  ·  Problem: Epilepsy.   ·  Plan: S/p hospitalization 12/15-12/18/21 for seizures (CSF negative).  Continue Keppra.   Consider neuro consult this AM.     Problem/Plan - 9:  ·  Problem: RA (rheumatoid arthritis).   ·  Plan: History of RA and SLE.   Continue Plaquenil.     Problem/Plan - 10:  ·  Problem: Severe protein-calorie malnutrition.   ·  Plan; Trend daily weights.   Nutrition consult to be placed this AM.     Problem/Plan - 11:  ·  Problem: IV drug abuse.   ·  Plan: H/o substance abuse (marijuana, cocaine), IVDA (heroin) which patient denies.     Problem/Plan - 12:  ·  Problem: Abnormal finding on breast imaging.   ·  Plan: As noted on CT chest 2/12. Asymmetric right breast density with nonspecific soft tissue infiltration extending to right chest wall.  Patient will need non-emergent breast imaging correlation to exclude underlying malignancy including inflammatory carcinoma.   Patient also noted to have Retroperitoneal lymphadenopathy with a cluster of multiple enlarged left paraaortic and pelvic lymph nodes on previous imaging.   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 diff colitis 12/2021, bacteremia, fungemia, resp failure s/p Trach/PEG 7/13/20 (since reversed), stage 4 sacral decub since 2021, multiple episodes of seizures & syncope with HD, s/p hospitalization 12/15-12/18/21 for seizures (CSF negative), hospitalized at [facility] 1/18/22-2/2/22 after found unresponsive at home after being left alone for an hour, diagnosed with Aspiration PNA requiring intubation, LLL infiltrate s/p 7 day course of Vanco/Zosyn, discharged 2/2/22 and readmitted to [facility] 2/4/22 after LOC during HD. Hospital course complicated by MRSA bacteremia 2/4 (treated with IV Vanco), dialysis catheter tip thrombus noted with permacath removed and RIJ Shiley placed 2/11, C.abicans and C. tropicalis fungemia 2/11 (IV Caspofungin X 4 days transitioned to IV Diflucan 2/16). 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 Transferred to [facility] for CT surgery evaluation.  TTE at [facility] revealed LVEF 20-25%, Severely decreased global left ventricular systolic function, catheter seen in the right atrium with a highly mobile echogenicity visualized on aortic valve, thrombus, an mass or thrombus seen in the inferior vena cava measuring 3.9 x 1.5 x 1.4 cm. No intervention was planned per CT surgery. As per CTS the patient requires further Heme/Onc evaluation as she underwent a CT A/P at , which revealed an abnormal breast density and extensive lymphadenopathy. Additionally the patient will need eventual ischemic workup for decreased LVEF.    #Right atrial thrombus.  ~as per [facility] => TTE revealed an EF 20-25%, 2 mobile right atrial masses, moderate MR, moderate TR, a 3.9 x 1.5 x 1.4 cm IVC thrombus   ~as per [facility] the patient was on Eliquis at [facility] for clot at the tip of the previous dialysis cath. Eliquis held since 2/16/22.   ~will continue Eliquis as no surgical intervention planned at this time     # Sepsis- POA   # MRSA bacteremia,   # C. albicans and C. 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  ~In setting of IV drug use disorder, HD permacath was removed 2/7.   ~R IJ Shiley since placed 2/11.  ~Now on IV vancomycin (TIW) post dialysis  ~Monitor Vancomycin trough  ~Repeat blood cultures 2/11 ultimately grew C. albicans and C. tropicalis. Started on IV caspofungin 2/12.   Repeat cultures 2/12 and 2/15 no growth to date. Switched to PO Diflucan 2/16.   ~Shiley catheter ultimately needs to be removed however given patient's past and current issues with vascular access, risk for stenosis / fibrosis to vessels with repeated line changes, difficulty getting labs without such access, decision was made not to remove Shiley and instead draw blood cultures from Shiley after dialysis    ~cont. Diflucan 200mg po daily  ~as per [facility] 1 set of Blood C/S sent on 2/19 at [facility]- testing  ~Opthalmology eval was requested at  (never done?)    #Acute on chronic systolic congestive heart failure.   ~EF previously 40-45% on prior TTEs, now with an EF of 20-25% on TTE 2/18.   ~HD for high volume removal, however Hyponatremia persisting  ~Needs more diuresis cont. 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   ~trend H/H  ~cont. Epoetin a  ~f/u w/ Nephrology in the am  ~Blood transfusion with HD PRN (last PRBC given with HD 2/17)  ~continue to save Right arm (no BP cuffs, IVs or blood draws) as able with plan for eventual AVF creation.    #Decubitus ulcer of sacral region, stage 4.   ~cont. collegenase   ~continue offloading   ~wound care consult this AM    #Epilepsy   ~cont. Levetiracetam 500mg po daily  ~cont. Levetiracetam 250mg po TIW (postHD)    #RA (rheumatoid arthritis).   #History of RA and SLE.   #cont. Hydroxychloroquine 200mg po daily    #Severe protein-calorie malnutrition  ~f/u w/ Nutrition    #Abnormal finding on breast imaging.   ~As noted on CT chest 2/12 => symmetric right breast density with nonspecific soft tissue infiltration extending to right chest wall.  Patient will need non-emergent breast imaging correlation to exclude underlying malignancy including inflammatory carcinoma.   Patient also noted to have Retroperitoneal lymphadenopathy with a cluster of multiple enlarged left paraaortic and pelvic lymph nodes on previous imaging.  ~f/u w/ Heme/Onc consultation in the am    #Vte ppx  ~cont. Eliquis

## 2022-02-19 NOTE — H&P ADULT - PROBLEM SELECTOR PLAN 1
GRAHAM report from [facility] as above.  TTE on arrival showing an EF 20-25%, 2 mobile right atrial masses, moderate MR, moderate TR, 3cm IVC thombus as per verbal report from Dr. Franks.   Was on Eliquis at [facility] for clot at the tip of the previous dialysis cath. Eliquis held since 2/16/22.   Will discuss anticoagulation options with Surgeon this AM.   Plan to be discussed / reviewed with CT Surgery attending / team during AM rounds.

## 2022-02-19 NOTE — CHART NOTE - NSCHARTNOTEFT_GEN_A_CORE
Overnight pt transferred to [facility] but did not require upgrade to ICU level care and remained hemodynamically stable.   At this time pt is not a candidate for cardiac surgical intervention. The following consults have been called/placed: nephrology, cardiology, ID, case management, nutrition, wound, social work and surgery/plastics.  Ms Mane's case and hospital course have been reviewed with Dr Cline. She remains stable and has been transferred to medicine (tele w/ ). Dr Cline has agreed to and will take over for the care of this patient. Overnight pt transferred to [facility] but did not require upgrade to ICU level care and remained hemodynamically stable.   At this time pt is not a candidate for cardiac surgical intervention. The following consults have been called/placed: nephrology, cardiology, ID, palliative care, case management, nutrition, wound, social work and surgery/plastics.  Ms Mane's case and hospital course have been reviewed with Dr Cline. She remains stable and has been transferred to medicine (tele w/ ). Dr Cline has agreed to and will take over for the care of this patient.

## 2022-02-19 NOTE — H&P ADULT - PROBLEM SELECTOR PLAN 4
EF previously 40-45% on prior TTEs, now with an EF of 20-25% on TTE 2/18.   Continue supportive medications.

## 2022-02-19 NOTE — CONSULT NOTE ADULT - ASSESSMENT
1)ESRD HD  2) MBD of renal dx  3) Anemia of renal dx  4) Vol HTN    HD today  Will follow  kasia Valencia NP

## 2022-02-19 NOTE — DISCHARGE NOTE NURSING/CASE MANAGEMENT/SOCIAL WORK - NSDCPEFALRISK_GEN_ALL_CORE
For information on Fall & Injury Prevention, visit: [email or web address] OR  [email or web address] OR  https://www.cdc.gov/steadi/patient.html

## 2022-02-19 NOTE — DISCHARGE NOTE NURSING/CASE MANAGEMENT/SOCIAL WORK - PATIENT PORTAL LINK FT
You can access the FollowMyHealth Patient Portal offered by [facility] by registering at the following website: [email or web address]. By joining [employer]’s FollowMyHealth portal, you will also be able to view your health information using other applications (apps) compatible with our system.

## 2022-02-19 NOTE — CONSULT NOTE ADULT - ASSESSMENT
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 Hospital course complicated by MRSA bacteremia 2/4 (treated with IV Vanco), dialysis catheter tip thrombus noted with permacath removed on 2/7 and COREY Reddy placed 2/11, C.abicans and C.tropicalis fungemia 2/11 (IV Caspofungin X 4 days transitioned to IV Diflucan 2/16). Started on IV caspofungin 2/12. Repeat cultures 2/12 and 2/15 no growth to date. Switched to PO Diflucan 2/16. Patient with persistent fevers despite all of the above and so a subsequent GRAHAM 2/18 significant for multilobulated atrial mass attached to the septum, mild MR, mild TR, and an EF of 45-50%. Transferred to [facility]- [facility] for further evaluation.   given patient's past and current issues with vascular access, risk for stenosis / fibrosis to vessels with repeated line changes, difficulty getting labs without such access, decision made not to remove Shiley and instead draw blood cultures from Shiley after dialysis 2/17    [facility] COURSE:  Evaluated by CTS. No Surgical interventions offered. Medical management per Signout. Transferred to MEDICINE SERVICE    # Sepsis- POA   # MRSA bacteremia,   # C. albicans and C. 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   Dialysis catheter was removed 2/7. R IJ Shiley since placed 2/11.  MRSA on blood culture 2/4. Now on IV vancomycin.   TTE done 2/8 negative for vegetations.   Repeat blood cultures 2/11 ultimately grew C. albicans and C. tropicalis. Started on IV caspofungin 2/12.   Repeat cultures 2/12 and 2/15 no growth to date. Switched to PO Diflucan 2/16.   Recognize that Shiley needs to be removed. However, after lengthy discussion with Nephrology and ID (at ), given patient's past and current issues with vascular access, risk for stenosis / fibrosis to vessels with repeated line changes, difficulty getting labs without such access, decision made not to remove Shiley and instead draw blood cultures from Shiley after dialysis 2/17.   GRAHAM notable for vegetation.    - Continue IV vancomycin post dialysis  - Continue PO Diflucan 200mg daily  - 1 set of Bld C/S sent on 2/19 at [facility]- testing  - Monitor for fever, maintain MAP > 65  - Opthalmology eval was requested at . Will d/w ID about the same here    # Uncontrolled HTN - diastolic  - Meds reviewed and addressed   - Lasix bid added to regimen  - Metoprolol home dose resumed  - Hydralazine and Lisinopril on Hold    # Elevated BNP,   # AE CHFrEF  - TTE with EF of 20-25%. However GRAHAM with EF of 40-45%.  - HFrEF in addition to ESRD  - being effectively dialyzed. However Hyponatremia persisting  - Needs more diuresis. starting Lasix 40 IVP bid  - Consulted cardiology    # Elevated TSH  - with normal TSH in Jan 2022.   - likely sick Euthyroid  - Rpt TFTs in AM and if still abn, will consult Endocrine    # Dialysis catheter tip thrombus  Catheter since removed due to MRSA bacteremia and sepsis. On Eliquis.  - Continue Eliquis but held for possible procedure. SInce no surgical intervention planned, will resume    # ESRD on HD MWF  Currently with Shiley. Will need more permanent HD access once clinically improved and bacteremia / fungemia cleared. Vascular Surgery consulted here  - Continue dialysis sessions. Last HD 2/18 at   - Continue to save R arm (no BP cuffs, IVs or blood draws) and will plan for eventual AVF creation  - Renal consulted    # CKD anemia  # Renal bone disease  - S/P PRBC transfusion with HD 2/12 and again 2/17. Hgb 8.8- approx at baseline.  - Continue epo  - Continue sevelamer    # Hyponatremia  -  HFrEF in addition to ESRD  - Add diuresis    # Stage II decubitus wound on coccyx  - Continue wound care  - Pressure ulcer prevention protocol  - Wound care consult here    # Hx of seizure  - On Keppra    # Rheumatoid arthritis  - Continue Plaquenil    # Abnormal imaging of the breast  As noted on CT chest 2/12. Asymmetric right breast density with nonspecific soft tissue infiltration extending to right chest wall.   - Patient will need non-emergent breast imaging correlation to exclude underlying malignancy including inflammatory carcinoma. Case was discussed with Onc Dr. Rodriguez and Breast Surgery Dr. Carrera    # Retroperitoneal lymphadenopathy with a cluster of multiple enlarged left paraaortic and pelvic lymph nodes  As noted on CT abd/pelvis 2/12 and imaging prior. Rather stable in size.   - Patient will need continued follow up regarding these findings    # Severe protein calorie malnutrition  Appreciate input from Nutrition  - Encourage po intake, trend weight    Eliquis resumed    HOME MEDS:  Lisionopril 10; Keppra 500 QD and 250mg extra MWF after HD; sevelemar, metoprolol 50 bid; hydralazine 25 tid; gabapentin 100 tid; plaquanil 200 QD; PPI; Atovoquone 750mg QD; albuterol inh, melatonin, colagense/ silvadene; Nicotine patch    Meds reviewed and reconciled.       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 Hospital course complicated by MRSA bacteremia 2/4 (treated with IV Vanco), dialysis catheter tip thrombus noted with permacath removed on 2/7 and COREY Reddy placed 2/11, C.abicans and C.tropicalis fungemia 2/11 (IV Caspofungin X 4 days transitioned to IV Diflucan 2/16). Started on IV caspofungin 2/12. Repeat cultures 2/12 and 2/15 no growth to date. Switched to PO Diflucan 2/16. Patient with persistent fevers despite all of the above and so a subsequent GRAHAM 2/18 significant for multilobulated atrial mass attached to the septum, mild MR, mild TR, and an EF of 45-50%. Transferred to [facility]- CT[facility] for further evaluation.   given patient's past and current issues with vascular access, risk for stenosis / fibrosis to vessels with repeated line changes, difficulty getting labs without such access, decision made not to remove Shiley and instead draw blood cultures from Shiley after dialysis 2/17    [facility] COURSE:  Evaluated by CTS. No Surgical interventions offered. Medical management per Signout. Transferred to MEDICINE SERVICE    # Sepsis- POA   # MRSA bacteremia,   # C. albicans and C. 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   Dialysis catheter was removed 2/7. R IJ Shiley since placed 2/11.  MRSA on blood culture 2/4. Now on IV vancomycin.   TTE done 2/8 negative for vegetations.   Repeat blood cultures 2/11 ultimately grew C. albicans and C. tropicalis. Started on IV caspofungin 2/12.   Repeat cultures 2/12 and 2/15 no growth to date. Switched to PO Diflucan 2/16.   Recognize that Shiley needs to be removed. However, after lengthy discussion with Nephrology and ID (at ), given patient's past and current issues with vascular access, risk for stenosis / fibrosis to vessels with repeated line changes, difficulty getting labs without such access, decision made not to remove Shiley and instead draw blood cultures from Shiley after dialysis 2/17.   GRAHAM notable for vegetation.    - Continue IV vancomycin post dialysis  - Continue PO Diflucan 200mg daily  - 1 set of Bld C/S sent on 2/19 at [facility]- testing  - Monitor for fever, maintain MAP > 65  - Opthalmology eval was requested at . Will d/w ID about the same here    # Uncontrolled HTN - diastolic  - Meds reviewed and addressed   - Lasix bid added to regimen  - Metoprolol home dose resumed  - Hydralazine and Lisinopril on Hold    # Elevated BNP,   # AE CHFrEF  - TTE with EF of 20-25%. However GRAHAM with EF of 40-45%.  - HFrEF in addition to ESRD  - being effectively dialyzed. However Hyponatremia persisting  - Needs more diuresis. starting Lasix 40 IVP bid  - Consulted cardiology    # Elevated TSH  - with normal TSH in Jan 2022.   - likely sick Euthyroid  - Rpt TFTs in AM and if still abn, will consult Endocrine    # Dialysis catheter tip thrombus  Catheter since removed due to MRSA bacteremia and sepsis. On Eliquis.  - Continue Eliquis but held for possible procedure. SInce no surgical intervention planned, will resume    # ESRD on HD MWF  Currently with Shiley. Will need more permanent HD access once clinically improved and bacteremia / fungemia cleared. Vascular Surgery consulted here  - Continue dialysis sessions. Last HD 2/18 at   - Continue to save R arm (no BP cuffs, IVs or blood draws) and will plan for eventual AVF creation  - Renal consulted    # CKD anemia  # Renal bone disease  - S/P PRBC transfusion with HD 2/12 and again 2/17. Hgb 8.8- approx at baseline.  - Continue epo  - Continue sevelamer    # Hyponatremia  -  HFrEF in addition to ESRD  - Add diuresis    # Stage II decubitus wound on coccyx  - Continue wound care  - Pressure ulcer prevention protocol  - Wound care consult here    # Hx of seizure  - On Keppra    # Rheumatoid arthritis  - Continue Plaquenil    # Abnormal imaging of the breast  As noted on CT chest 2/12. Asymmetric right breast density with nonspecific soft tissue infiltration extending to right chest wall.   - Patient will need non-emergent breast imaging correlation to exclude underlying malignancy including inflammatory carcinoma. Case was discussed with Onc Dr. Rodriguez and Breast Surgery Dr. Carrera    # Retroperitoneal lymphadenopathy with a cluster of multiple enlarged left paraaortic and pelvic lymph nodes  As noted on CT abd/pelvis 2/12 and imaging prior. Rather stable in size.   - Patient will need continued follow up regarding these findings    # Severe protein calorie malnutrition  Appreciate input from Nutrition  - Encourage po intake, trend weight    Eliquis resumed    HOME MEDS:  Lisionopril 10; Keppra 500 QD and 250mg extra MWF after HD; sevelemar, metoprolol 50 bid; hydralazine 25 tid; gabapentin 100 tid; plaquanil 200 QD; PPI; Atovoquone 750mg QD; albuterol inh, melatonin, colagense/ silvadene; Nicotine patch    Meds reviewed and reconciled.    D/W Vascular- Doppler of shiley catheter veins and neck veins to assess if THROMBUS still persisting. If +ve tos tart Heparin GTT instead of PO Eliquis.    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 Hospital course complicated by MRSA bacteremia 2/4 (treated with IV Vanco), dialysis catheter tip thrombus noted with permacath removed on 2/7 and COREY Reddy placed 2/11, C.abicans and C.tropicalis fungemia 2/11 (IV Caspofungin X 4 days transitioned to IV Diflucan 2/16). Started on IV caspofungin 2/12. Repeat cultures 2/12 and 2/15 no growth to date. Switched to PO Diflucan 2/16. Patient with persistent fevers despite all of the above and so a subsequent GRAHAM 2/18 significant for multilobulated atrial mass attached to the septum, mild MR, mild TR, and an EF of 45-50%. Transferred to [facility]- CTICU for further evaluation.   given patient's past and current issues with vascular access, risk for stenosis / fibrosis to vessels with repeated line changes, difficulty getting labs without such access, decision made not to remove Shiley and instead draw blood cultures from Shiley after dialysis 2/17    [facility] COURSE:  Evaluated by CTS. No Surgical interventions offered. Medical management per Signout. Transferred to MEDICINE SERVICE    # Sepsis- POA   # MRSA bacteremia,   # C. albicans and C. 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   Dialysis catheter was removed 2/7. R IJ Shiley since placed 2/11.  MRSA on blood culture 2/4. Now on IV vancomycin.   TTE done 2/8 negative for vegetations.   Repeat blood cultures 2/11 ultimately grew C. albicans and C. tropicalis. Started on IV caspofungin 2/12.   Repeat cultures 2/12 and 2/15 no growth to date. Switched to PO Diflucan 2/16.   Recognize that Shiley needs to be removed. However, after lengthy discussion with Nephrology and ID (at ), given patient's past and current issues with vascular access, risk for stenosis / fibrosis to vessels with repeated line changes, difficulty getting labs without such access, decision made not to remove Shiley and instead draw blood cultures from Shiley after dialysis 2/17.   GRAHAM notable for vegetation.    - Continue IV vancomycin post dialysis  - Continue PO Diflucan 200mg daily  - 1 set of Bld C/S sent on 2/19 at [facility]- testing  - Monitor for fever, maintain MAP > 65  - Opthalmology eval was requested at . Will d/w ID about the same here    # Uncontrolled HTN - diastolic  - Meds reviewed and addressed   - Lasix bid added to regimen  - Metoprolol home dose resumed  - Hydralazine and Lisinopril on Hold    # Elevated BNP,   # AE CHFrEF  - TTE with EF of 20-25%. However GRAHAM with EF of 40-45%.  - HFrEF in addition to ESRD  - being effectively dialyzed. However Hyponatremia persisting  - Needs more diuresis. starting Lasix 40 IVP bid  - Consulted cardiology    # Elevated TSH  - with normal TSH in Jan 2022.   - likely sick Euthyroid  - Rpt TFTs in AM and if still abn, will consult Endocrine    # Dialysis catheter tip thrombus  Catheter since removed due to MRSA bacteremia and sepsis. On Eliquis.  - Continue Eliquis but held for possible procedure. SInce no surgical intervention planned, will resume    # ESRD on HD MWF  Currently with Shiley. Will need more permanent HD access once clinically improved and bacteremia / fungemia cleared. Vascular Surgery consulted here  - Continue dialysis sessions. Last HD 2/18 at   - Continue to save R arm (no BP cuffs, IVs or blood draws) and will plan for eventual AVF creation  - Renal consulted    # CKD anemia  # Renal bone disease  - S/P PRBC transfusion with HD 2/12 and again 2/17. Hgb 8.8- approx at baseline.  - Continue epo  - Continue sevelamer    # Hyponatremia  -  HFrEF in addition to ESRD  - Add diuresis    # Stage II decubitus wound on coccyx  - Continue wound care  - Pressure ulcer prevention protocol  - Wound care consult here    # Hx of seizure  - On Keppra    # Rheumatoid arthritis  - Continue Plaquenil    # Abnormal imaging of the breast  As noted on CT chest 2/12. Asymmetric right breast density with nonspecific soft tissue infiltration extending to right chest wall.   - Patient will need non-emergent breast imaging correlation to exclude underlying malignancy including inflammatory carcinoma. Case was discussed with Onc Dr. Rodriguez and Breast Surgery Dr. Carrera    # Retroperitoneal lymphadenopathy with a cluster of multiple enlarged left paraaortic and pelvic lymph nodes  As noted on CT abd/pelvis 2/12 and imaging prior. Rather stable in size.   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    D/W Vascular- Doppler of shiley catheter veins and neck veins to assess if THROMBUS still persisting. If +ve tos tart Heparin GTT instead of PO Eliquis.    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 Hospital course complicated by MRSA bacteremia 2/4 (treated with IV Vanco), dialysis catheter tip thrombus noted with permacath removed on 2/7 and COREY Reddy placed 2/11, C.abicans and C.tropicalis fungemia 2/11 (IV Caspofungin X 4 days transitioned to IV Diflucan 2/16). Started on IV caspofungin 2/12. Repeat cultures 2/12 and 2/15 no growth to date. Switched to PO Diflucan 2/16. Patient with persistent fevers despite all of the above and so a subsequent GRAHAM 2/18 significant for multilobulated atrial mass attached to the septum, mild MR, mild TR, and an EF of 45-50%. Transferred to [facility]- CTICU for further evaluation.   given patient's past and current issues with vascular access, risk for stenosis / fibrosis to vessels with repeated line changes, difficulty getting labs without such access, decision made not to remove Shiley and instead draw blood cultures from Shiley after dialysis 2/17    [facility] COURSE:  Evaluated by CTS. No Surgical interventions offered. Medical management per Signout. Transferred to MEDICINE SERVICE    # Sepsis- POA   # MRSA bacteremia,   # C. albicans and C. 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   Dialysis catheter was removed 2/7. R IJ Shiley since placed 2/11.  MRSA on blood culture 2/4. Now on IV vancomycin.   TTE done 2/8 negative for vegetations.   Repeat blood cultures 2/11 ultimately grew C. albicans and C. tropicalis. Started on IV caspofungin 2/12.   Repeat cultures 2/12 and 2/15 no growth to date. Switched to PO Diflucan 2/16.   Recognize that Shiley needs to be removed. However, after lengthy discussion with Nephrology and ID (at ), given patient's past and current issues with vascular access, risk for stenosis / fibrosis to vessels with repeated line changes, difficulty getting labs without such access, decision made not to remove Shiley and instead draw blood cultures from Shiley after dialysis 2/17.   GRAHAM notable for vegetation.    - Continue IV vancomycin post dialysis  - Continue PO Diflucan 200mg daily  - 1 set of Bld C/S sent on 2/19 at [facility]- testing  - Monitor for fever, maintain MAP > 65  - Opthalmology eval was requested at . Will d/w ID about the same here    # Uncontrolled HTN - diastolic  - Meds reviewed and addressed   - Lasix bid added to regimen  - Metoprolol home dose resumed  - Hydralazine and Lisinopril on Hold    # Elevated BNP,   # AE CHFrEF  - TTE with EF of 20-25%. However GRAHAM with EF of 40-45%.  - HFrEF in addition to ESRD  - being effectively dialyzed. However Hyponatremia persisting  - Needs more diuresis. starting Lasix 40 IVP bid  - Consulted cardiology    # Elevated TSH  - with normal TSH in Jan 2022.   - likely sick Euthyroid  - Rpt TFTs in AM and if still abn, will consult Endocrine    # Dialysis catheter tip thrombus  Catheter since removed due to MRSA bacteremia and sepsis. On Eliquis.  - Continue Eliquis but held for possible procedure. SInce no surgical intervention planned, will resume    # ESRD on HD MWF  Currently with Shiley. Will need more permanent HD access once clinically improved and bacteremia / fungemia cleared. Vascular Surgery consulted here  - Continue dialysis sessions. Last HD 2/18 at   - Continue to save R arm (no BP cuffs, IVs or blood draws) and will plan for eventual AVF creation  - Renal consulted    # CKD anemia  # Renal bone disease  - S/P PRBC transfusion with HD 2/12 and again 2/17. Hgb 8.8- approx at baseline.  - Continue epo  - Continue sevelamer    # Hyponatremia  -  HFrEF in addition to ESRD  - Add diuresis    # Stage II decubitus wound on coccyx  - Continue wound care  - Pressure ulcer prevention protocol  - Wound care consult here    # Hx of seizure  - On Keppra    # Rheumatoid arthritis  - Continue Plaquenil    # Abnormal imaging of the breast  As noted on CT chest 2/12. Asymmetric right breast density with nonspecific soft tissue infiltration extending to right chest wall.   - Patient will need non-emergent breast imaging correlation to exclude underlying malignancy including inflammatory carcinoma. Case was discussed with Onc Dr. Rodriguez and Breast Surgery Dr. Carrera    # Retroperitoneal lymphadenopathy with a cluster of multiple enlarged left paraaortic and pelvic lymph nodes  As noted on CT abd/pelvis 2/12 and imaging prior. Rather stable in size.   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    D/W Vascular- Doppler of shiley catheter veins and neck veins to assess if THROMBUS still persisting. If +ve tos tart Heparin GTT instead of PO Eliquis.    D/W CTC- to Transfer back to . 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 Hospital course complicated by MRSA bacteremia 2/4 (treated with IV Vanco), dialysis catheter tip thrombus noted with permacath removed on 2/7 and COREY Reddy placed 2/11, C.abicans and C.tropicalis fungemia 2/11 (IV Caspofungin X 4 days transitioned to IV Diflucan 2/16). Started on IV caspofungin 2/12. Repeat cultures 2/12 and 2/15 no growth to date. Switched to PO Diflucan 2/16. Patient with persistent fevers despite all of the above and so a subsequent GRAHAM 2/18 significant for multilobulated atrial mass attached to the septum, mild MR, mild TR, and an EF of 45-50%. Transferred to [facility] for further evaluation.   given patient's past and current issues with vascular access, risk for stenosis / fibrosis to vessels with repeated line changes, difficulty getting labs without such access, decision made not to remove Shiley and instead draw blood cultures from Shiley after dialysis 2/17    [facility] COURSE:  Evaluated by CTS. No Surgical interventions offered. Medical management per Signout. Transferred to MEDICINE SERVICE    # Sepsis- POA   # MRSA bacteremia,   # C. albicans and C. 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   Dialysis catheter was removed 2/7. R IJ Shiley since placed 2/11.  MRSA on blood culture 2/4. Now on IV vancomycin.   TTE done 2/8 negative for vegetations.   Repeat blood cultures 2/11 ultimately grew C. albicans and C. tropicalis. Started on IV caspofungin 2/12.   Repeat cultures 2/12 and 2/15 no growth to date. Switched to PO Diflucan 2/16.   Recognize that Shiley needs to be removed. However, after lengthy discussion with Nephrology and ID (at ), given patient's past and current issues with vascular access, risk for stenosis / fibrosis to vessels with repeated line changes, difficulty getting labs without such access, decision made not to remove Shiley and instead draw blood cultures from Shiley after dialysis 2/17.   GRAHAM notable for vegetation.    - Continue IV vancomycin post dialysis  - Continue PO Diflucan 200mg daily  - 1 set of Bld C/S sent on 2/19 at [facility]- testing  - Monitor for fever, maintain MAP > 65  - Opthalmology eval was requested at . Will d/w ID about the same here    # Uncontrolled HTN - diastolic  - Meds reviewed and addressed   - Lasix bid added to regimen  - Metoprolol home dose resumed  - Hydralazine and Lisinopril on Hold    # Elevated BNP,   # AE CHFrEF  - TTE with EF of 20-25%. However GRAHAM with EF of 40-45%.  - HFrEF in addition to ESRD  - being effectively dialyzed. However Hyponatremia persisting  - Needs more diuresis. starting Lasix 40 IVP bid  - Consulted cardiology    # Elevated TSH  - with normal TSH in Jan 2022.   - likely sick Euthyroid  - Rpt TFTs in AM and if still abn, will consult Endocrine    # Dialysis catheter tip thrombus  Catheter since removed due to MRSA bacteremia and sepsis. On Eliquis.  - Continue Eliquis but held for possible procedure. SInce no surgical intervention planned, will resume    # ESRD on HD MWF  Currently with Shiley. Will need more permanent HD access once clinically improved and bacteremia / fungemia cleared. Vascular Surgery consulted here  - Continue dialysis sessions. Last HD 2/18 at   - Continue to save R arm (no BP cuffs, IVs or blood draws) and will plan for eventual AVF creation  - Renal consulted    # CKD anemia  # Renal bone disease  - S/P PRBC transfusion with HD 2/12 and again 2/17. Hgb 8.8- approx at baseline.  - Continue epo  - Continue sevelamer    # Hyponatremia  -  HFrEF in addition to ESRD  - Add diuresis    # Stage II decubitus wound on coccyx  - Continue wound care  - Pressure ulcer prevention protocol  - Wound care consult here    # Hx of seizure  - On Keppra    # Rheumatoid arthritis  - Continue Plaquenil    # Abnormal imaging of the breast  As noted on CT chest 2/12. Asymmetric right breast density with nonspecific soft tissue infiltration extending to right chest wall.   - Patient will need non-emergent breast imaging correlation to exclude underlying malignancy including inflammatory carcinoma. Case was discussed with Onc Dr. Rodriguez and Breast Surgery Dr. Carrera    # Retroperitoneal lymphadenopathy with a cluster of multiple enlarged left paraaortic and pelvic lymph nodes  As noted on CT abd/pelvis 2/12 and imaging prior. Rather stable in size.   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    ADDENDUM:    D/W Vascular- Doppler of shiley catheter veins and neck veins to assess if THROMBUS still persisting. If +ve tos tart Heparin GTT instead of PO Eliquis.    D/W CTC- to Transfer back to . Signout given to  Hospitalist on call via CTC at 2:13 PM    Echo read by cardiology, Thrombus extending into right atrium Decision to restart A/C made. However, pt with poor IV access and Heparin GTT although prudent given AOCD, will require multiple phlebotomies leading to further drop in H/H but more importantly will be unable to monitor PTT sec to poor access and inability to draw labs. So starting ELIQUIS and monitor daily H/H.

## 2022-02-19 NOTE — CONSULT NOTE ADULT - ASSESSMENT
41 year old female former smoker with an extensive medical history, who was transferred to [facility] from [facility] with findings of an multilobulated atrial mass attached to the septum, mild MR, mild TR, and an EF of 45-50%.     General surgery consulted because of sacral decubitus ulcer. The patient reports that the sacral decubitus ulcer has been present since last year, when she was hospitalized in 2021. She reports that the wound was being managed conservatively at [facility]; chart review reveals dressing changes with santyl were being done at [facility]. She reports soreness at the site of the sacral decubitus ulcer, but no fevers or chills in the last 24 hours. She states that she avoids putting pressure on the area of the sacral ulcer due to discomfort.     - Surgery will follow while patient is admitted

## 2022-02-19 NOTE — DISCHARGE NOTE PROVIDER - CARE PROVIDER_API CALL
Will Raya; BLADIMIR)  Surgery; Thoracic and Cardiac Surgery  [address] [place] [address]  Phone: [phone]  Fax: [phone]  Follow Up Time:     Nakia Medina)  Infectious Disease; Internal Medicine  [address] [place] [address]  Phone: [phone]  Fax: [phone]  Follow Up Time:     Reina Ramey ()  Internal Medicine  [address] [place] [address]  Phone: [phone]  Fax: [phone]  Follow Up Time:

## 2022-02-19 NOTE — H&P ADULT - PROBLEM SELECTOR PLAN 6
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  Currently has RIJ Shiley placed on 2/11 since previous permacath has a clot at the tip and patient had persistent bacteremia. Need to consider line change.   Blood transfusion with HD PRN (last PRBC given with HD 2/17).   Trend H/H.  Continue retacrit.  Nephrology consult to be called this AM.  Continue to save Right arm (no BP cuffs, IVs or blood draws) as able with plan for eventual AVF creation.

## 2022-02-19 NOTE — H&P ADULT - NSHPLABSRESULTS_GEN_ALL_CORE
8.8    7.19  )-----------( 249      ( 2022 08:11 )             26.9     02-18    131<L>  |  99  |  12  ----------------------------<  107<H>  3.5   |  24  |  3.17<H>    Ca    7.9<L>      2022 08:11    PT/INR - ( 2022 07:35 )   PT: 16.0 sec;   INR: 1.40 ratio      PTT - ( 2022 07:35 )  PTT:27.4 sec    Urinalysis Basic - ( 2022 23:01 )  Color: Yellow / Appearance: Slightly Turbid / S.010 / pH: x  Gluc: x / Ketone: Negative  / Bili: Negative / Urobili: Negative mg/dL   Blood: x / Protein: 30 mg/dL / Nitrite: Negative   Leuk Esterase: Trace / RBC: 0-2 /HPF / WBC 0-2 /HPF   Sq Epi: x / Non Sq Epi: Many / Bacteria: x      Culture - Blood (22 @ 11:37)    -  Staphylococcus epidermidis, Methicillin resistant: Detec    Culture - Blood (22 @ 08:08)  Growth in aerobic bottle: Candida albicans  Growth in aerobic bottle: Candida tropicalis    Culture - Blood (22 @ 14:06)    -  Methicillin resistant Staphylococcus aureus (MRSA): Detec    < from: Transesophageal Echocardiogram (22 @ 10:41) >   Summary  There is a highly mobile, multilobulated, echogenic mass attached to the right atrial septum consistent with a vegetation. There is mild mitral valve regurgitation. The aortic valve is trileaflet with thin pliable leaflets. Trace aortic regurgitation is present. Mild (1+) tricuspid valve regurgitation is present. Trace pulmonic valvular regurgitation is present. Estimated left ventricular ejection fraction is 45-50 %.  < end of copied text >

## 2022-02-19 NOTE — CONSULT NOTE ADULT - ASSESSMENT
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 (M/W/F), GERD, seizures, epilepsy, depression, COPD, HTN, substance abuse (marijuana, cocaine), IVDA (heroin), C.diff colitis 12/2021, bacteremia, fungemia, resp failure s/p Trach/PEG 7/13/20 (since reversed), stage 4 sacral decub since 2021, multiple episodes of seizures & syncope with HD, s/p hospitalization 12/15-12/18/21 for seizures (CSF negative), hospitalized at [facility] 1/18/22-2/2/22 after found unresponsive at home after being left alone for an hour, diagnosed with Aspiration PNA requiring intubation, LLL infiltrate s/p 7 day course of Vanco/Zosyn, discharged 2/2/22 and readmitted to [facility] 2/4/22 after LOC during HD. Hospital course complicated by MRSA bacteremia 2/4 (treated with IV Vanco), dialysis catheter tip thrombus noted with permacath removed and RIJ Shinory placed 2/11, C.abicans and C.tropicalis fungemia 2/11 (IV Caspofungin X 4 days transitioned to IV Diflucan 2/16). 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 Transferred to [facility] for further evaluation.  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 (M/W/F), GERD, seizures, epilepsy, depression, COPD, HTN, substance abuse (marijuana, cocaine), IVDA (heroin), C.diff colitis 12/2021, bacteremia, fungemia, resp failure s/p Trach/PEG 7/13/20 (since reversed), stage 4 sacral decub since 2021, multiple episodes of seizures & syncope with HD, s/p hospitalization 12/15-12/18/21 for seizures (CSF negative), hospitalized at [facility] 1/18/22-2/2/22 after found unresponsive at home after being left alone for an hour, diagnosed with Aspiration PNA requiring intubation, LLL infiltrate s/p 7 day course of Vanco/Zosyn, discharged 2/2/22 and readmitted to [facility] 2/4/22 after LOC during HD. Hospital course complicated by MRSA bacteremia 2/4 (treated with IV Vanco), dialysis catheter tip thrombus noted with permacath removed and RIJ Shinory placed 2/11, C.abicans and C.tropicalis fungemia 2/11 (IV Caspofungin X 4 days transitioned to IV Diflucan 2/16). 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 Transferred to [facility] for further evaluation.  (19 Feb 2022 01:07)    HFrEF, acute on chronic   TTE and GRAHAM EF 40-45% from [facility], repeat TTE 2/18 EF 20-25%  cont lasix  change lopressor to toprol XL   start entresto 24/26mg BID   HD for high volume removal    IVC thrombus with extension into R atrium  eliquis stopped by medicine team  would resume or start heparin gtt  [facility]d [facility]- abnormal breast denisty, retroperitoneal lymphadenopathy  recommend heme/onc soncult    Bacteremia  TTE with echodensityto catheter tip, possible aortic valve growth, IVC thrombis EF 20-25%  GRAHAM completed at [facility]   no need to repeat GRAHAM at this time, unless ID requesting     ESRD  as per nephrology  HD today

## 2022-02-19 NOTE — H&P ADULT - NSHPSOCIALHISTORY_GEN_ALL_CORE
Patient states she lives in her dead grandmothers house with her brother who is a part-time  who helps care for her. State they have a big yard so he is able to work on cars at the house and be around if she needs.   Former smoker, states she quit 4 months ago, smoked 1/4 PPD since she was young.   States she uses marijuana. Denies any other drug use / abuse. UTox positive multiple times for cocaine and opiates. +IVDU (heroin as per chart).  States she does not drink alcohol.   Uses a wheelchair at home due to uncontrolled R knee pain from her RA. Has a shower chair and equipment from her grandmother that she uses to accomplish her ADLs.

## 2022-02-19 NOTE — H&P ADULT - PROBLEM SELECTOR PLAN 3
C.abicans and C.tropicalis fungemia 2/11 treated with IV Caspofungin X 4 days, then transitioned to IV Diflucan 2/16.  ID consult to be called this AM.  Opthalmology eval when able.

## 2022-02-19 NOTE — H&P ADULT - HISTORY OF PRESENT ILLNESS
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 diff colitis 12/2021, bacteremia, fungemia, resp failure s/p Trach/PEG 7/13/20 (since reversed), stage 4 sacral decub since 2021, multiple episodes of seizures & syncope with HD, s/p hospitalization 12/15-12/18/21 for seizures (CSF negative), hospitalized at [facility] 1/18/22-2/2/22 after found unresponsive at home after being left alone for an hour, diagnosed with Aspiration PNA requiring intubation, LLL infiltrate s/p 7 day course of Vanco/Zosyn, discharged 2/2/22 and readmitted to [facility] 2/4/22 after LOC during HD. Hospital course complicated by MRSA bacteremia 2/4 (treated with IV Vanco), dialysis catheter tip thrombus noted with permacath removed and RIJ Shiley placed 2/11, C.abicans and C. tropicalis fungemia 2/11 (IV Caspofungin X 4 days transitioned to IV Diflucan 2/16). 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 Transferred to [facility] for CT surgery evaluation.  TTE at [facility] revealed LVEF 20-25%, Severely decreased global left ventricular systolic function, catheter seen in the right atrium with a highly mobile echogenicity visualized on aortic valve, thrombus, an mass or thrombus seen in the inferior vena cava measuring 3.9 x 1.5 x 1.4 cm. No intervention was planned per CT surgery. As per CTS the patient requires further Heme/Onc evaluation as she underwent a CT A/P at , which revealed an abnormal breast density and extensive lymphadenopathy. Additionally the patient will need eventual ischemic workup for decreased LVEF.

## 2022-02-19 NOTE — DISCHARGE NOTE PROVIDER - PROVIDER TOKENS
PROVIDER:[TOKEN:[[id number]]],PROVIDER:[TOKEN:[[id number]]],PROVIDER:[TOKEN:[[id number]]]

## 2022-02-19 NOTE — H&P ADULT - PROBLEM SELECTOR PLAN 12
As noted on CT chest 2/12. Asymmetric right breast density with nonspecific soft tissue infiltration extending to right chest wall.  Patient will need non-emergent breast imaging correlation to exclude underlying malignancy including inflammatory carcinoma.   Patient also noted to have Retroperitoneal lymphadenopathy with a cluster of multiple enlarged left paraaortic and pelvic lymph nodes on previous imaging.

## 2022-02-19 NOTE — H&P ADULT - ASSESSMENT
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 (M/W/F), GERD, seizures, epilepsy, depression, COPD, HTN, substance abuse (marijuana, cocaine), IVDA (heroin), C.diff colitis 12/2021, bacteremia, fungemia, resp failure s/p Trach/PEG 7/13/20 (since reversed), stage 4 sacral decub since 2021, multiple episodes of seizures & syncope with HD, s/p hospitalization 12/15-12/18/21 for seizures (CSF negative), hospitalized at [facility] 1/18/22-2/2/22 after found unresponsive at home after being left alone for an hour, diagnosed with Aspiration PNA requiring intubation, LLL infiltrate s/p 7 day course of Vanco/Zosyn, discharged 2/2/22 and readmitted to [facility] 2/4/22 after LOC during HD. Hospital course complicated by MRSA bacteremia 2/4 (treated with IV Vanco), dialysis catheter tip thrombus noted with permacath removed and RIJ Shiley placed 2/11, C.abicans and C.tropicalis fungemia 2/11 (IV Caspofungin X 4 days transitioned to IV Diflucan 2/16). 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 Transferred to [facility] for further evaluation.     TTE on arrival showing an EF 20-25%, 2 mobile right atrial masses, moderate MR, moderate TR, 3cm IVC thombus as per verbal report from Dr. Franks. Patient septic overnight with rigors, tachypnea, transferred to the CTICU for further monitoring.  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 (M/W/F), GERD, seizures, epilepsy, depression, COPD, HTN, substance abuse (marijuana, cocaine), IVDA (heroin), C.diff colitis 12/2021, bacteremia, fungemia, resp failure s/p Trach/PEG 7/13/20 (since reversed), stage 4 sacral decub since 2021, multiple episodes of seizures & syncope with HD, s/p hospitalization 12/15-12/18/21 for seizures (CSF negative), hospitalized at [facility] 1/18/22-2/2/22 after found unresponsive at home after being left alone for an hour, diagnosed with Aspiration PNA requiring intubation, LLL infiltrate s/p 7 day course of Vanco/Zosyn, discharged 2/2/22 and readmitted to [facility] 2/4/22 after LOC during HD. Hospital course complicated by MRSA bacteremia 2/4 (treated with IV Vanco), dialysis catheter tip thrombus noted with permacath removed and RIJ Shiley placed 2/11, C.abicans and C.tropicalis fungemia 2/11 (IV Caspofungin X 4 days transitioned to IV Diflucan 2/16). 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 Transferred to [facility] for further evaluation.     TTE on arrival showing an EF 20-25%, 2 mobile right atrial masses, moderate MR, moderate TR, 3cm IVC thrombus as per verbal report from Dr. Franks. Patient septic overnight with rigors, tachypnea, transferred to the CTICU for further monitoring.

## 2022-02-19 NOTE — DISCHARGE NOTE PROVIDER - NSDCCPCAREPLAN_GEN_ALL_CORE_FT
PRINCIPAL DISCHARGE DIAGNOSIS  Diagnosis: Fungemia  Assessment and Plan of Treatment:       SECONDARY DISCHARGE DIAGNOSES  Diagnosis: ESRD with anemia  Assessment and Plan of Treatment: MWF HD    Diagnosis: Hyponatremia  Assessment and Plan of Treatment:     Diagnosis: Acute on chronic systolic congestive heart failure  Assessment and Plan of Treatment:     Diagnosis: Right atrial thrombus  Assessment and Plan of Treatment:     Diagnosis: Bacteremia due to Staphylococcus  Assessment and Plan of Treatment: MRSA    Diagnosis: Endocarditis  Assessment and Plan of Treatment:

## 2022-02-19 NOTE — CONSULT NOTE ADULT - SUBJECTIVE AND OBJECTIVE BOX
ACUTE CARE SURGERY CONSULT    HPI:  41 year old female former smoker (PPD, quit 2021) with a medical history of Lupus, RA, ANCA vasculitis with ESRD on HD (M/W/F), GERD, seizures, epilepsy, depression, COPD, HTN, substance abuse (marijuana, cocaine), IVDA (heroin), C.diff colitis 2021, bacteremia, fungemia, resp failure s/p Trach/PEG 20 (since reversed), stage 4 sacral decub since , multiple episodes of seizures & syncope with HD, s/p hospitalization 12/15-21 for seizures (CSF negative), hospitalized at [facility] 22-22 after found unresponsive at home after being left alone for an hour, diagnosed with Aspiration PNA requiring intubation, LLL infiltrate s/p 7 day course of Vanco/Zosyn, discharged 22 and readmitted to [facility] 22 after LOC during HD. Hospital course complicated by MRSA bacteremia  (treated with IV Vanco), dialysis catheter tip thrombus noted with permacath removed and RIJ Shiley placed , C.abicans and C.tropicalis fungemia  (IV Caspofungin X 4 days transitioned to IV Diflucan ). 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 Transferred to [facility] for further evaluation.  (2022 01:07)    General surgery consulted because of sacral decubitus ulcer. The patient reports that the sacral decubitus ulcer has been present since last year, when she was hospitalized in . She reports that the wound was being managed conservatively at [facility]; chart review reveals dressing changes with santyl were being done at [facility]. She reports soreness at the site of the sacral decubitus ulcer, but no fevers or chills in the last 24 hours. She states that she avoids putting pressure on the area of the sacral ulcer due to discomfort.     PAST MEDICAL HISTORY:  Lupus    Rheumatoid arthritis    H/O Raynaud&#x27;s syndrome    Heroin abuse    Smoker    Rheumatoid arthritis    Sepsis    HTN (hypertension)    COPD (chronic obstructive pulmonary disease)    Depression    Epilepsy    GERD (gastroesophageal reflux disease)    Raynaud&#x27;s syndrome without gangrene    Bacteremia    Systemic lupus erythematosus    Aphonia        PAST SURGICAL HISTORY:  No significant past surgical history    H/O tubal ligation    H/O appendicitis    Gall bladder stones    H/O tracheostomy    S/P percutaneous endoscopic gastrostomy (PEG) tube placement        ALLERGIES:  morphine (Rash)      FAMILY HISTORY: Noncontributory    SOCIAL HISTORY: Denies tobacco, EtOH, illicit substance use.     HOME MEDICATIONS:    MEDICATIONS  (STANDING):  artificial tears (preservative free) Ophthalmic Solution 1 Drop(s) Both EYES two times a day  chlorhexidine 2% Cloths 1 Application(s) Topical daily  collagenase Ointment 1 Application(s) Topical daily  epoetin amee-epbx (RETACRIT) Injectable 88410 Unit(s) IV Push <User Schedule>  fluconAZOLE IVPB 200 milliGRAM(s) IV Intermittent every 24 hours  furosemide   Injectable 40 milliGRAM(s) IV Push two times a day  gabapentin 100 milliGRAM(s) Oral three times a day  heparin   Injectable 5000 Unit(s) SubCutaneous every 12 hours  hydroxychloroquine 200 milliGRAM(s) Oral daily  levETIRAcetam 250 milliGRAM(s) Oral <User Schedule>  levETIRAcetam 500 milliGRAM(s) Oral daily  metoprolol tartrate 50 milliGRAM(s) Oral two times a day  pantoprazole    Tablet 40 milliGRAM(s) Oral before breakfast  sevelamer carbonate 800 milliGRAM(s) Oral three times a day with meals  sodium chloride 0.9% lock flush 3 milliLiter(s) IV Push every 8 hours  vancomycin  IVPB 500 milliGRAM(s) IV Intermittent once    MEDICATIONS  (PRN):  acetaminophen     Tablet .. 650 milliGRAM(s) Oral every 6 hours PRN Temp greater or equal to 38.5C (101.3F), Mild Pain (1 - 3)  ALBUTerol    90 MICROgram(s) HFA Inhaler 2 Puff(s) Inhalation every 6 hours PRN Shortness of Breath and/or Wheezing  melatonin 3 milliGRAM(s) Oral at bedtime PRN Insomnia  oxyCODONE    IR 5 milliGRAM(s) Oral every 4 hours PRN Moderate Pain (4 - 6)  senna 2 Tablet(s) Oral at bedtime PRN Constipation      VITALS & I/Os:  Vital Signs Last 24 Hrs  T(C): 36.6 (2022 12:00), Max: 37.6 (2022 00:45)  T(F): 97.9 (2022 12:00), Max: 99.7 (2022 00:45)  HR: 87 (2022 12:00) (79 - 145)  BP: 154/94 (2022 12:00) (114/76 - 162/100)  BP(mean): 115 (2022 12:00) (89 - 119)  RR: 16 (2022 12:00) (14 - 26)  SpO2: 99% (2022 12:00) (95% - 100%)  CAPILLARY BLOOD GLUCOSE          I&O's Summary    2022 07:01  -  2022 07:00  --------------------------------------------------------  IN: 420 mL / OUT: 150 mL / NET: 270 mL    2022 07:01  -  2022 12:46  --------------------------------------------------------  IN: 200 mL / OUT: 150 mL / NET: 50 mL        GENERAL: Laying in bed, in no acute distress.  RESPIRATORY: Nonlabored on RA. R IJ Shiley catheter in place  CARDIOVASCULAR: RRR.  GASTROINTESTINAL: Abdomen soft, NT, ND, -R/-G.   INTEGUMENTARY: No overt rashes or lesions, petechia or purpura. Good turgor. No edema.  BACK: 3 x 4 cm sacral decubitus ulcer noted in gluteal cleft; no purulence noted  LYMPHATIC: Palpation of neck reveals no swelling or tenderness of neck nodes. Palpation of groin reveals no swelling or tenderness of groin nodes.    LABS:                        8.0    7.90  )-----------( 225      ( 2022 04:23 )             23.8     02-    128<L>  |  93<L>  |  19.2  ----------------------------<  93  3.8   |  21.0<L>  |  4.00<H>    Ca    7.9<L>      2022 04:23  Mg     1.7         TPro  5.6<L>  /  Alb  2.6<L>  /  TBili  0.3<L>  /  DBili  x   /  AST  7   /  ALT  <5  /  AlkPhos  94      Lactate: acetaminophen     Tablet .. 650 milliGRAM(s) Oral every 6 hours PRN  ALBUTerol    90 MICROgram(s) HFA Inhaler 2 Puff(s) Inhalation every 6 hours PRN  artificial tears (preservative free) Ophthalmic Solution 1 Drop(s) Both EYES two times a day  chlorhexidine 2% Cloths 1 Application(s) Topical daily  collagenase Ointment 1 Application(s) Topical daily  epoetin amee-epbx (RETACRIT) Injectable 44475 Unit(s) IV Push <User Schedule>  fluconAZOLE IVPB 200 milliGRAM(s) IV Intermittent every 24 hours  furosemide   Injectable 40 milliGRAM(s) IV Push two times a day  gabapentin 100 milliGRAM(s) Oral three times a day  heparin   Injectable 5000 Unit(s) SubCutaneous every 12 hours  hydroxychloroquine 200 milliGRAM(s) Oral daily  levETIRAcetam 250 milliGRAM(s) Oral <User Schedule>  levETIRAcetam 500 milliGRAM(s) Oral daily  melatonin 3 milliGRAM(s) Oral at bedtime PRN  metoprolol tartrate 50 milliGRAM(s) Oral two times a day  oxyCODONE    IR 5 milliGRAM(s) Oral every 4 hours PRN  pantoprazole    Tablet 40 milliGRAM(s) Oral before breakfast  senna 2 Tablet(s) Oral at bedtime PRN  sevelamer carbonate 800 milliGRAM(s) Oral three times a day with meals  sodium chloride 0.9% lock flush 3 milliLiter(s) IV Push every 8 hours  vancomycin  IVPB 500 milliGRAM(s) IV Intermittent once     PT/INR - ( 2022 04:23 )   PT: 14.7 sec;   INR: 1.28 ratio         PTT - ( 2022 04:23 )  PTT:31.1 sec          Urinalysis Basic - ( 2022 23:01 )    Color: Yellow / Appearance: Slightly Turbid / S.010 / pH: x  Gluc: x / Ketone: Negative  / Bili: Negative / Urobili: Negative mg/dL   Blood: x / Protein: 30 mg/dL / Nitrite: Negative   Leuk Esterase: Trace / RBC: 0-2 /HPF / WBC 0-2 /HPF   Sq Epi: x / Non Sq Epi: Many / Bacteria: x        IMAGING:

## 2022-02-19 NOTE — CONSULT NOTE ADULT - SUBJECTIVE AND OBJECTIVE BOX
[facility]                                              CARDIOLOGY AT [address], [place] [address]                                             Telephone: [phone]. Fax:[phone]                                                       CARDIOLOGY CONSULTATION NOTE                                                                                             Consult requested by:  Dorota  History obtained by: Patient and medical record  Community Cardiologist:    obtained: Yes [  ] No [  ]  Reason for Consultation:   Available out pt records reviewed: Yes [  ] No [  ]    Chief complaint:    Patient is a 41y old  Female who presents with a chief complaint of Right atrial thrombus (2022 09:04)        HPI:  41 year old female former smoker (PPD, quit 2021) with a medical history of Lupus, RA, ANCA vasculitis with ESRD on HD (M/W/F), GERD, seizures, epilepsy, depression, COPD, HTN, substance abuse (marijuana, cocaine), IVDA (heroin), C.diff colitis 2021, bacteremia, fungemia, resp failure s/p Trach/PEG 20 (since reversed), stage 4 sacral decub since , multiple episodes of seizures & syncope with HD, s/p hospitalization 12/15-21 for seizures (CSF negative), hospitalized at [facility] 22-22 after found unresponsive at home after being left alone for an hour, diagnosed with Aspiration PNA requiring intubation, LLL infiltrate s/p 7 day course of Vanco/Zosyn, discharged 22 and readmitted to [facility] 22 after LOC during HD. Hospital course complicated by MRSA bacteremia  (treated with IV Vanco), dialysis catheter tip thrombus noted with permacath removed and RIJ Shiley placed , C.abicans and C.tropicalis fungemia  (IV Caspofungin X 4 days transitioned to IV Diflucan ). 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 Transferred to [facility] for further evaluation.  (2022 01:07)        CARDIAC TESTING   ECHO:  < from: TTE Echo Complete w/ Contrast w/ Doppler (22 @ 21:27) >    Summary:   1. Left ventricular ejection fraction, by visual estimation, is 20 to   25%.   2. Severely decreased global left ventricular systolic function.   3. Mildly reduced RV systolic function.   4. Catheter seen in the right atrium with a highly mobile echogenicity   visualized.   5. There is no evidence of pericardial effusion.   6. Mild thickening of the anterior and posterior mitral valve leaflets.   7. Moderate mitral valve regurgitation.   8. Mild buckling of the posterior mitral valve leaflet.   9. Moderate-severe tricuspid regurgitation.  10. Questionable small mobile echodensity visualized on the aortic valve.  11. Mild to moderate pulmonic valve regurgitation.  12. Estimated pulmonary artery systolic pressure is 39.8 mmHg assuming a   right atrial pressure of 8 mmHg, which is consistent with borderline   pulmonary hypertension.  13. Mass or thrombus seen inthe inferior vena cava measuring 3.9 x 1.5 x   1.4 cm.  14. Discussed with the Floor CT Surgery PA on 2022.  15. Recommendations: GRAHAM for further evaluation of Valvular disease and   echo densities noted on the echo.    MD Alexandrea Electronically signed on 2022 at 9:18:09 AM    < end of copied text >        PAST MEDICAL HISTORY  Lupus  Rheumatoid arthritis  H/O Raynaud&#x27;s syndrome  Heroin abuse  Smoker  Rheumatoid arthritis  Sepsis  HTN (hypertension)  COPD (chronic obstructive pulmonary disease)  Depression  Epilepsy  GERD (gastroesophageal reflux disease)  Raynaud syndrome without gangrene  Bacteremia  Systemic lupus erythematosus  Aphonia        PAST SURGICAL HISTORY  H/O tubal ligation  H/O appendicitis  Gall bladder stones  H/O tracheostomy  S/P percutaneous endoscopic gastrostomy (PEG) tube placement        SOCIAL HISTORY:  Denies smoking/alcohol/drugs  CIGARETTES:     ALCOHOL:  DRUGS:        FAMILY HISTORY:  Family history of cardiomyopathy (Mother)      Family History of Cardiovascular Disease:  Yes [  ] No [  ]  Coronary Artery Disease in first degree relative: Yes [  ] No [  ]  Sudden Cardiac Death in First degree relative: Yes [  ] No [  ]      HOME MEDICATIONS:      CURRENT MEDICATIONS:  furosemide   Injectable 40 milliGRAM(s) IV Push two times a day  metoprolol tartrate 50 milliGRAM(s) Oral two times a day     gabapentin  levETIRAcetam  levETIRAcetam  pantoprazole    Tablet  apixaban  artificial tears (preservative free) Ophthalmic Solution  chlorhexidine 2% Cloths  collagenase Ointment  epoetin amee-epbx (RETACRIT) Injectable  fluconAZOLE IVPB  hydroxychloroquine  sevelamer carbonate  sodium chloride 0.9% lock flush  vancomycin  IVPB        ALLERGIES: Allergies    morphine (Rash)    Intolerances          REVIEW OF SYMPTOMS:   CONSTITUTIONAL: No fever, no chills, no weight loss, no weight gain, no fatigue   ENMT:  No vertigo; No sinus or throat pain  NECK: No pain or stiffness  CARDIOVASCULAR: No chest pain, no dyspnea, no syncope/presyncope, no palpitations, no dizziness, no Orthopnea, no Paroxsymal nocturnal dyspnea  RESPIRATORY: no Shortness of breath, no cough, no wheezing  : No dysuria, no hematuria   GI: No dark color stool, no nausea, no diarrhea, no constipation, no abdominal pain   NEURO: No headache, no slurred speech   MUSCULOSKELETAL: No joint pain or swelling; No muscle, back, or extremity pain  PSYCH: No agitation, no anxiety.    ALL OTHER REVIEW OF SYSTEMS ARE NEGATIVE.      Vital Signs Last 24 Hrs  T(C): 36.4 (2022 08:43), Max: 37.6 (2022 00:45)  T(F): 97.6 (2022 08:43), Max: 99.7 (2022 00:45)  HR: 83 (2022 09:15) (81 - 145)  BP: 140/106 (2022 09:02) (114/76 - 162/100)  BP(mean): 119 (2022 09:02) (89 - 119)  RR: 18 (2022 09:15) (17 - 26)  SpO2: 97% (2022 09:15) (95% - 100%)      INTAKE AND OUTPUT:   -18 @ 07:01  -  02-19 @ 07:00  --------------------------------------------------------  IN: 420 mL / OUT: 150 mL / NET: 270 mL        PHYSICAL EXAM:  Constitutional: Comfortable . No acute distress.   HEENT: Atraumatic and normocephalic , neck is supple . no JVD. No carotid bruit.  CNS: A&Ox3. No focal deficits.   Respiratory: CTAB, unlabored   Cardiovascular: RRR normal s1 s2. No murmur. No rubs or gallop.  Gastrointestinal: Soft, non-tender. +Bowel sounds.   MSK: full ROM extremities x 4  Extremities: No edema. No cyanosis   Psychiatric: Calm . no agitation.   Skin: Warm and dry, no ulcers on extremities       LABS:                        8.0    7.90  )-----------( 225      ( 2022 04:23 )             23.8         128<L>  |  93<L>  |  19.2  ----------------------------<  93  3.8   |  21.0<L>  |  4.00<H>    Ca    7.9<L>      2022 04:23  Mg     1.7         TPro  5.6<L>  /  Alb  2.6<L>  /  TBili  0.3<L>  /  DBili  x   /  AST  7   /  ALT  <5  /  AlkPhos  94        ;p-BNP=Serum Pro-Brain Natriuretic Peptide: >39672 pg/mL ( @ 04:23)    PT/INR - ( 2022 04:23 )   PT: 14.7 sec;   INR: 1.28 ratio         PTT - ( 2022 04:23 )  PTT:31.1 sec  Urinalysis Basic - ( 2022 23:01 )    Color: Yellow / Appearance: Slightly Turbid / S.010 / pH: x  Gluc: x / Ketone: Negative  / Bili: Negative / Urobili: Negative mg/dL   Blood: x / Protein: 30 mg/dL / Nitrite: Negative   Leuk Esterase: Trace / RBC: 0-2 /HPF / WBC 0-2 /HPF   Sq Epi: x / Non Sq Epi: Many / Bacteria: x          INTERPRETATION OF TELEMETRY:     ECG:   Prior ECG: Yes [  ] No [  ]      RADIOLOGY & ADDITIONAL STUDIES:    X-ray:  reviewed by me.   CT scan:   MRI:   US:                                                [facility]                                              CARDIOLOGY AT [address], [place] [address]                                             Telephone: [phone]. Fax:[phone]                                                       CARDIOLOGY CONSULTATION NOTE                                                                                             Consult requested by:  Dorota  History obtained by: Patient and medical record  Community Cardiologist:    obtained: Yes [  ] No [  ]  Reason for Consultation:   Available out pt records reviewed: Yes [  ] No [  ]    Chief complaint:    Patient is a 41y old  Female who presents with a chief complaint of Right atrial thrombus (2022 09:04)        HPI:  41 year old female former smoker (PPD, quit 2021) with a medical history of Lupus, RA, ANCA vasculitis with ESRD on HD (M/W/F), GERD, seizures, epilepsy, depression, COPD, HTN, substance abuse (marijuana, cocaine), IVDA (heroin), C.diff colitis 2021, bacteremia, fungemia, resp failure s/p Trach/PEG 20 (since reversed), stage 4 sacral decub since , multiple episodes of seizures & syncope with HD, s/p hospitalization 12/15-21 for seizures (CSF negative), hospitalized at [facility] 22-22 after found unresponsive at home after being left alone for an hour, diagnosed with Aspiration PNA requiring intubation, LLL infiltrate s/p 7 day course of Vanco/Zosyn, discharged 22 and readmitted to [facility] 22 after LOC during HD. Hospital course complicated by MRSA bacteremia  (treated with IV Vanco), dialysis catheter tip thrombus noted with permacath removed and RIJ Shiley placed , C.abicans and C.tropicalis fungemia  (IV Caspofungin X 4 days transitioned to IV Diflucan ). 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 Transferred to [facility] for further evaluation.  (2022 01:07)        CARDIAC TESTING   ECHO:  < from: TTE Echo Complete w/ Contrast w/ Doppler (22 @ 21:27) >    Summary:   1. Left ventricular ejection fraction, by visual estimation, is 20 to   25%.   2. Severely decreased global left ventricular systolic function.   3. Mildly reduced RV systolic function.   4. Catheter seen in the right atrium with a highly mobile echogenicity   visualized.   5. There is no evidence of pericardial effusion.   6. Mild thickening of the anterior and posterior mitral valve leaflets.   7. Moderate mitral valve regurgitation.   8. Mild buckling of the posterior mitral valve leaflet.   9. Moderate-severe tricuspid regurgitation.  10. Questionable small mobile echodensity visualized on the aortic valve.  11. Mild to moderate pulmonic valve regurgitation.  12. Estimated pulmonary artery systolic pressure is 39.8 mmHg assuming a   right atrial pressure of 8 mmHg, which is consistent with borderline   pulmonary hypertension.  13. Mass or thrombus seen inthe inferior vena cava measuring 3.9 x 1.5 x   1.4 cm.  14. Discussed with the Floor CT Surgery PA on 2022.  15. Recommendations: GRAHAM for further evaluation of Valvular disease and   echo densities noted on the echo.    MD Alexandrea Electronically signed on 2022 at 9:18:09 AM    < end of copied text >    < from: Transesophageal Echocardiogram (22 @ 10:41) >  Type of Study:     GRAHAM procedure:     HR: 80 bpmBP: 134/76 mmHg     Study Location: 3ETechnical Quality: Good    Indications   1) I38 - Endocarditis     Findings     Mitral Valve   There is mild mitral valve regurgitation.     Aortic Valve   The aortic valve is trileaflet with thin pliable leaflets.   Trace aortic regurgitation is present.     Tricuspid Valve   Mild (1+) tricuspid valve regurgitation is present.     Pulmonic Valve   Trace pulmonic valvular regurgitation is present.     Left Atrium   The left atrium appears normal.   No thrombus seen in the left atrial appendage.     Left Ventricle   Estimated left ventricular ejection fraction is 45-50 %.     Right Atrium   There is a highly mobile, multilobulated, echogenic mass attached to the   right atrial septum consistent with a vegetation.     Right Ventricle   The right ventricle is normal in size.     Miscellaneous   The IVC appears normal.     Impression     Summary     There is a highly mobile, multilobulated, echogenic mass attached to the   right atrial septum consistent with a vegetation.   There is mild mitral valve regurgitation.   The aortic valve is trileaflet with thin pliable leaflets.   Trace aortic regurgitation is present.   Mild (1+) tricuspid valve regurgitation is present.   Trace pulmonic valvular regurgitation is present.   Estimated left ventricular ejection fraction is 45-50 %.     Signature    < end of copied text >      PAST MEDICAL HISTORY  Lupus  Rheumatoid arthritis  H/O Raynaud&#x27;s syndrome  Heroin abuse  Smoker  Rheumatoid arthritis  Sepsis  HTN (hypertension)  COPD (chronic obstructive pulmonary disease)  Depression  Epilepsy  GERD (gastroesophageal reflux disease)  Raynaud syndrome without gangrene  Bacteremia  Systemic lupus erythematosus  Aphonia        PAST SURGICAL HISTORY  H/O tubal ligation  H/O appendicitis  Gall bladder stones  H/O tracheostomy  S/P percutaneous endoscopic gastrostomy (PEG) tube placement        SOCIAL HISTORY:  Denies smoking/alcohol/drugs  CIGARETTES:     ALCOHOL:  DRUGS:        FAMILY HISTORY:  Family history of cardiomyopathy (Mother)      Family History of Cardiovascular Disease:  Yes [  ] No [  ]  Coronary Artery Disease in first degree relative: Yes [  ] No [  ]  Sudden Cardiac Death in First degree relative: Yes [  ] No [  ]      HOME MEDICATIONS:      CURRENT MEDICATIONS:  furosemide   Injectable 40 milliGRAM(s) IV Push two times a day  metoprolol tartrate 50 milliGRAM(s) Oral two times a day     gabapentin  levETIRAcetam  levETIRAcetam  pantoprazole    Tablet  apixaban  artificial tears (preservative free) Ophthalmic Solution  chlorhexidine 2% Cloths  collagenase Ointment  epoetin amee-epbx (RETACRIT) Injectable  fluconAZOLE IVPB  hydroxychloroquine  sevelamer carbonate  sodium chloride 0.9% lock flush  vancomycin  IVPB        ALLERGIES: Allergies    morphine (Rash)    Intolerances          REVIEW OF SYMPTOMS:   CONSTITUTIONAL: No fever, no chills, no weight loss, no weight gain, no fatigue   ENMT:  No vertigo; No sinus or throat pain  NECK: No pain or stiffness  CARDIOVASCULAR: No chest pain, no dyspnea, no syncope/presyncope, no palpitations, no dizziness, no Orthopnea, no Paroxsymal nocturnal dyspnea  RESPIRATORY: no Shortness of breath, no cough, no wheezing  : No dysuria, no hematuria   GI: No dark color stool, no nausea, no diarrhea, no constipation, no abdominal pain   NEURO: No headache, no slurred speech   MUSCULOSKELETAL: No joint pain or swelling; No muscle, back, or extremity pain  PSYCH: No agitation, no anxiety.    ALL OTHER REVIEW OF SYSTEMS ARE NEGATIVE.      Vital Signs Last 24 Hrs  T(C): 36.4 (2022 08:43), Max: 37.6 (2022 00:45)  T(F): 97.6 (2022 08:43), Max: 99.7 (2022 00:45)  HR: 83 (2022 09:15) (81 - 145)  BP: 140/106 (2022 09:02) (114/76 - 162/100)  BP(mean): 119 (2022 09:02) (89 - 119)  RR: 18 (2022 09:15) (17 - 26)  SpO2: 97% (2022 09:15) (95% - 100%)      INTAKE AND OUTPUT:    @ 07:01  -  - @ 07:00  --------------------------------------------------------  IN: 420 mL / OUT: 150 mL / NET: 270 mL        PHYSICAL EXAM:  Constitutional: Comfortable . No acute distress.   HEENT: Atraumatic and normocephalic , neck is supple . no JVD. No carotid bruit.  CNS: A&Ox3. No focal deficits.   Respiratory: CTAB, unlabored   Cardiovascular: RRR normal s1 s2. No murmur. No rubs or gallop.  Gastrointestinal: Soft, non-tender. +Bowel sounds.   MSK: full ROM extremities x 4  Extremities: No edema. No cyanosis   Psychiatric: Calm . no agitation.   Skin: Warm and dry, no ulcers on extremities       LABS:                        8.0    7.90  )-----------( 225      ( 2022 04:23 )             23.8     02-19    128<L>  |  93<L>  |  19.2  ----------------------------<  93  3.8   |  21.0<L>  |  4.00<H>    Ca    7.9<L>      2022 04:23  Mg     1.7         TPro  5.6<L>  /  Alb  2.6<L>  /  TBili  0.3<L>  /  DBili  x   /  AST  7   /  ALT  <5  /  AlkPhos  94        ;p-BNP=Serum Pro-Brain Natriuretic Peptide: >54685 pg/mL ( @ 04:23)    PT/INR - ( 2022 04:23 )   PT: 14.7 sec;   INR: 1.28 ratio         PTT - ( 2022 04:23 )  PTT:31.1 sec  Urinalysis Basic - ( 2022 23:01 )    Color: Yellow / Appearance: Slightly Turbid / S.010 / pH: x  Gluc: x / Ketone: Negative  / Bili: Negative / Urobili: Negative mg/dL   Blood: x / Protein: 30 mg/dL / Nitrite: Negative   Leuk Esterase: Trace / RBC: 0-2 /HPF / WBC 0-2 /HPF   Sq Epi: x / Non Sq Epi: Many / Bacteria: x          INTERPRETATION OF TELEMETRY:     ECG:   Prior ECG: Yes [  ] No [  ]      RADIOLOGY & ADDITIONAL STUDIES:    X-ray:  reviewed by me.   CT scan:   MRI:   US:

## 2022-02-19 NOTE — H&P ADULT - HISTORY OF PRESENT ILLNESS
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 (M/W/F), GERD, seizures, epilepsy, depression, COPD, HTN, substance abuse (marijuana, cocaine), IVDA (heroin), C.diff colitis 12/2021, bacteremia, fungemia, resp failure s/p Trach/PEG 7/13/20 (since reversed), stage 4 sacral decub since 2021, multiple episodes of seizures & syncope with HD, s/p hospitalization 12/15-12/18/21 for seizures (CSF negative), hospitalized at [facility] 1/18/22-2/2/22 after found unresponsive at home after being left alone for an hour, diagnosed with Aspiration PNA requiring intubation, LLL infiltrate s/p 7 day course of Vanco/Zosyn, discharged 2/2/22 and readmitted to [facility] 2/4/22 after LOC during HD. Hospital course complicated by MRSA bacteremia 2/4 (treated with IV Vanco), dialysis catheter tip thrombus noted with permacath removed and COREY Reddy placed 2/11, C.abicans and C.tropicalis fungemia 2/11 (IV Caspofungin X 4 days transitioned to IV Diflucan 2/16). 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 Transferred to [facility] for further evaluation.

## 2022-02-19 NOTE — H&P ADULT - NSHPPHYSICALEXAM_GEN_ALL_CORE
General: Cachectic, thin, frail female lying in bed in mild distress with rigors   Neuro: A+O x 3, non-focal, speech clear and intact  HEENT:  NCAT. No conjuctival edema or icterus, no thrush.    Neck:  COREY tavarez for HD with site C/D/I. Supple, trachea midline, prior tracheostomy scar  Pulm: +Diffuse wheezing bilaterally, +tachypnea, no accessory muscle use   CV: tachy rate, regular rhythm, +S1S2  Abd: soft, NT, ND, + BS  +stage 4 Sacral decub, skin edges intact, wound appears clean  Ext: BLAKELY x 4, trace LE edema, +b/l wrist/hand deformity/hypertrophy from RA with Right wrist splint in place, overall distal motor/neuro/circ intact  Skin: +diffuse scarring throughout UEs and LEs, warm, dry, perfused

## 2022-02-19 NOTE — PATIENT PROFILE ADULT - FALL HARM RISK - HARM RISK INTERVENTIONS

## 2022-02-19 NOTE — PHARMACOTHERAPY INTERVENTION NOTE - COMMENTS
Spoke to PA, recommended ID consult. Discussed changing vancomycin to 1g post dialysis and level prior however it is not currently clear when patient will go for dialysis provider will review. Spoke to PA, recommended ID consult. Discussed vancomycin doing post dialysis and level prior however it is not currently clear when patient will go for dialysis provider will review. Cimzia Pregnancy And Lactation Text: This medication crosses the placenta but can be considered safe in certain situations. Cimzia may be excreted in breast milk.

## 2022-02-19 NOTE — CONSULT NOTE ADULT - SUBJECTIVE AND OBJECTIVE BOX
[facility] DIVISION OF KIDNEY DISEASES AND HYPERTENSION -- INITIAL CONSULT NOTE  --------------------------------------------------------------------------------  HPI:  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 (M/W/F), GERD, seizures, epilepsy, depression, COPD, HTN, substance abuse (marijuana, cocaine), IVDA (heroin), C.diff colitis 12/2021, bacteremia, fungemia, resp failure s/p Trach/PEG 7/13/20 (since reversed), stage 4 sacral decub since 2021, multiple episodes of seizures & syncope with HD, s/p hospitalization 12/15-12/18/21 for seizures (CSF negative), hospitalized at [facility] 1/18/22-2/2/22 after found unresponsive at home after being left alone for an hour, diagnosed with Aspiration PNA requiring intubation, LLL infiltrate s/p 7 day course of Vanco/Zosyn, discharged 2/2/22 and readmitted to [facility] 2/4/22 after LOC during HD. Hospital course complicated by MRSA bacteremia 2/4 (treated with IV Vanco), dialysis catheter tip thrombus noted with permacath removed and COREY Reddy placed 2/11, C.abicans and C.tropicalis fungemia 2/11 (IV Caspofungin X 4 days transitioned to IV Diflucan 2/16). 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 Transferred to [facility] for further evaluation.     Above appreciated  Pt gets HD @ [email or web address] and follows with Dr Pereira. Seen/examined at bedside; due for HD today; consented.      PAST HISTORY  --------------------------------------------------------------------------------  PAST MEDICAL & SURGICAL HISTORY:  Rheumatoid arthritis    H/O Raynaud&#x27;s syndrome    Heroin abuse    Smoker    Sepsis    HTN (hypertension)    COPD (chronic obstructive pulmonary disease)    Depression    Epilepsy    GERD (gastroesophageal reflux disease)    Bacteremia    Systemic lupus erythematosus    Aphonia    H/O tubal ligation    H/O appendicitis    Gall bladder stones    H/O tracheostomy    S/P percutaneous endoscopic gastrostomy (PEG) tube placement      FAMILY HISTORY:  Family history of cardiomyopathy (Mother)      PAST SOCIAL HISTORY:    ALLERGIES & MEDICATIONS  --------------------------------------------------------------------------------  Allergies    morphine (Rash)    Intolerances      Standing Inpatient Medications  apixaban 2.5 milliGRAM(s) Oral two times a day  artificial tears (preservative free) Ophthalmic Solution 1 Drop(s) Both EYES two times a day  chlorhexidine 2% Cloths 1 Application(s) Topical daily  collagenase Ointment 1 Application(s) Topical daily  epoetin amee-epbx (RETACRIT) Injectable 45030 Unit(s) IV Push <User Schedule>  fluconAZOLE IVPB 200 milliGRAM(s) IV Intermittent every 24 hours  furosemide   Injectable 40 milliGRAM(s) IV Push two times a day  gabapentin 100 milliGRAM(s) Oral three times a day  hydroxychloroquine 200 milliGRAM(s) Oral daily  levETIRAcetam 250 milliGRAM(s) Oral <User Schedule>  levETIRAcetam 500 milliGRAM(s) Oral daily  metoprolol tartrate 50 milliGRAM(s) Oral two times a day  pantoprazole    Tablet 40 milliGRAM(s) Oral before breakfast  sevelamer carbonate 800 milliGRAM(s) Oral three times a day with meals  sodium chloride 0.9% lock flush 3 milliLiter(s) IV Push every 8 hours  vancomycin  IVPB 500 milliGRAM(s) IV Intermittent once    PRN Inpatient Medications  acetaminophen     Tablet .. 650 milliGRAM(s) Oral every 6 hours PRN  ALBUTerol    90 MICROgram(s) HFA Inhaler 2 Puff(s) Inhalation every 6 hours PRN  melatonin 3 milliGRAM(s) Oral at bedtime PRN  oxyCODONE    IR 5 milliGRAM(s) Oral every 4 hours PRN  senna 2 Tablet(s) Oral at bedtime PRN      REVIEW OF SYSTEMS  --------------------------------------------------------------------------------  Gen: No weight changes, fatigue, fevers/chills, weakness  Skin: No rashes  Head/Eyes/Ears/Mouth: No headache; Normal hearing; Normal vision w/o blurriness; No sinus pain/discomfort, sore throat  Respiratory: No dyspnea, cough, wheezing, hemoptysis  CV: No chest pain, PND, orthopnea  GI: No abdominal pain, diarrhea, constipation, nausea, vomiting, melena, hematochezia  : No increased frequency, dysuria, hematuria, nocturia  MSK: No joint pain/swelling; no back pain; no edema  Neuro: No dizziness/lightheadedness, weakness, seizures, numbness, tingling  Heme: No easy bruising or bleeding  Endo: No heat/cold intolerance  Psych: No significant nervousness, anxiety, stress, depression    All other systems were reviewed and are negative, except as noted.    VITALS/PHYSICAL EXAM  --------------------------------------------------------------------------------  T(C): 36.4 (02-19-22 @ 10:15), Max: 37.6 (02-19-22 @ 00:45)  HR: 80 (02-19-22 @ 10:15) (79 - 145)  BP: 121/85 (02-19-22 @ 10:15) (114/76 - 162/100)  RR: 20 (02-19-22 @ 10:15) (17 - 26)  SpO2: 100% (02-19-22 @ 10:15) (95% - 100%)  Wt(kg): --  Height (cm): 152.4 (02-19-22 @ 02:00)  Weight (kg): 57.7 (02-19-22 @ 02:00)  BMI (kg/m2): 24.8 (02-19-22 @ 02:00)  BSA (m2): 1.54 (02-19-22 @ 02:00)      02-18-22 @ 07:01  -  02-19-22 @ 07:00  --------------------------------------------------------  IN: 420 mL / OUT: 150 mL / NET: 270 mL    02-19-22 @ 07:01  -  02-19-22 @ 11:00  --------------------------------------------------------  IN: 0 mL / OUT: 150 mL / NET: -150 mL      Physical Exam:  	Gen: NAD, frail pale  	HEENT: PERRL, supple neck, clear oropharynx  	Pulm: CTA B/L  	CV: RRR, S1S2; no rub  	Back: No spinal or CVA tenderness; no sacral edema  	Abd: +BS, soft, nontender/nondistended  	: No suprapubic tenderness  	UE: Warm, FROM, no clubbing, intact strength; no edema; no asterixis  	LE: Warm, FROM, no clubbing, intact strength; no edema  	Neuro: No focal deficits, intact gait  	Psych: Normal affect and mood  	Skin: Warm, without rashes  	Vascular access:    LABS/STUDIES  --------------------------------------------------------------------------------              8.0    7.90  >-----------<  225      [02-19-22 @ 04:23]              23.8     128  |  93  |  19.2  ----------------------------<  93      [02-19-22 @ 04:23]  3.8   |  21.0  |  4.00        Ca     7.9     [02-19-22 @ 04:23]      Mg     1.7     [02-19-22 @ 04:23]    TPro  5.6  /  Alb  2.6  /  TBili  0.3  /  DBili  x   /  AST  7   /  ALT  <5  /  AlkPhos  94  [02-19-22 @ 04:23]    PT/INR: PT 14.7 , INR 1.28       [02-19-22 @ 04:23]  PTT: 31.1       [02-19-22 @ 04:23]      Creatinine Trend:  SCr 4.00 [02-19 @ 04:23]  SCr 3.17 [02-18 @ 08:11]  SCr 4.33 [02-17 @ 07:35]  SCr 5.21 [02-15 @ 10:52]  SCr 4.96 [02-12 @ 10:50]    Urinalysis - [02-18-22 @ 23:01]      Color Yellow / Appearance Slightly Turbid / SG 1.010 / pH 6.5      Gluc Negative / Ketone Negative  / Bili Negative / Urobili Negative       Blood Small / Protein 30 / Leuk Est Trace / Nitrite Negative      RBC 0-2 / WBC 0-2 / Hyaline  / Gran  / Sq Epi  / Non Sq Epi Many / Bacteria       Iron 172, TIBC 215, %sat 80      [10-25-21 @ 12:19]  Ferritin 1315      [10-25-21 @ 12:19]  PTH -- (Ca 7.8)      [10-25-21 @ 15:49]   142  Vitamin D (25OH) 8.2      [10-25-21 @ 15:49]  TSH 7.57      [02-19-22 @ 04:23]  Lipid: chol 137, , HDL 14, LDL --      [10-12-21 @ 09:54]    HBsAb Nonreact      [10-25-21 @ 15:49]  HBsAg Nonreact      [02-04-22 @ 14:11]  HCV 0.28, Nonreact      [02-04-22 @ 14:11]  HIV Nonreact      [02-15-22 @ 08:07]    MAE: titer 1:1280, pattern Speckled      [10-15-21 @ 12:02]  dsDNA 103      [10-25-21 @ 15:49]  C3 Complement 75      [10-25-21 @ 15:49]  C4 Complement 8      [10-25-21 @ 15:49]  Rheumatoid Factor <10      [10-15-21 @ 12:02]  ANCA: cANCA Negative, pANCA Positive, atypical ANCA Negative      [10-25-21 @ 12:19]  MPO-ANCA 42.5, interpretation: Positive      [10-25-21 @ 12:19]  PR3-ANCA 13.8, interpretation: Negative      [10-25-21 @ 12:19]

## 2022-02-19 NOTE — CONSULT NOTE ADULT - ATTENDING COMMENTS
Multiple medical issues and a sacral decubitus, limited in size stage III  Will treat accordingly with debridement and dressings
42 y/o F with extensive PMH as above was admitted to [facility] after being found unresponsive; was found to have aspiration PNA/MRSA bacteremia and HD catheter tip thrombus. GRAHAM at  showed multilobulated atrial mass, mild MR, mild TR. Patient was transferred to [facility] for CT surgery evaluation. TTE here showed LVEF 20-25%, catheter in RA with mobile echogenicity, possible small mobile echodensity on aortic valve, thrombus vs. mass in IVC with extension into right atrium. No intervention per CT surgery. Patient underwent CT A/P at , which showed abnormal breast density and extensive lymphadenopathy. Needs heme-onc evaluation. Continue diuresis with IV lasix, as well as HD per nephrology. Will need eventual ischemic workup for decreased LVEF pending hospital course.     Discussed with Dr. Cline  Following my evaluation, the patient was planned for transfer back to  for further management of her acute medical issues.     Thank you for allowing me to participate in the care of this patient.   Please contact me with any further questions

## 2022-02-19 NOTE — CONSULT NOTE ADULT - SUBJECTIVE AND OBJECTIVE BOX
CTICU D/G and Transfer to MEDICINE SERVICE    HPI:  41 year old female former smoker (PPD, quit 2021) with a medical history of Lupus, RA, ANCA vasculitis with ESRD on HD (M/W/F), GERD, seizures, epilepsy, depression, COPD, HTN, substance abuse (marijuana, cocaine), IVDA (heroin), C.diff colitis 2021, bacteremia, fungemia, resp failure s/p Trach/PEG 20 (since reversed), stage 4 sacral decub since , multiple episodes of seizures & syncope with HD, s/p hospitalization 12/15-21 for seizures (CSF negative), hospitalized at [facility] 22-22 after found unresponsive at home after being left alone for an hour, diagnosed with Aspiration PNA requiring intubation, LLL infiltrate s/p 7 day course of Vanco/Zosyn, discharged 22 and readmitted to [facility] 22 after LOC during HD. Hospital course complicated by MRSA bacteremia  (treated with IV Vanco), dialysis catheter tip thrombus noted with permacath removed on  and RIJ Shiley placed , C.abicans and C.tropicalis fungemia  (IV Caspofungin X 4 days transitioned to IV Diflucan ). Started on IV caspofungin . Repeat cultures  and 2/15 no growth to date. Switched to PO Diflucan . Patient with persistent fevers despite all of the above and so a subsequent GRAHAM  significant for multilobulated atrial mass attached to the septum, mild MR, mild TR, and an EF of 45-50%. Transferred to [facility]- CTICU for further evaluation.   given patient's past and current issues with vascular access, risk for stenosis / fibrosis to vessels with repeated line changes, difficulty getting labs without such access, decision made not to remove Shiley and instead draw blood cultures from Shiley after dialysis     COURSE:  Evaluated by CTS. No Surgical interventions offered. Medical management per Signout.    PAST MEDICAL & SURGICAL HISTORY:  Rheumatoid arthritis  H/O Raynaud&#x27;s syndrome  Heroin abuse  Smoker  Sepsis  HTN (hypertension)  COPD (chronic obstructive pulmonary disease)  Depression  Epilepsy  GERD (gastroesophageal reflux disease)  Bacteremia  Systemic lupus erythematosus  Aphonia  H/O tubal ligation  H/O appendicitis  Gall bladder stones  H/O tracheostomy  S/P percutaneous endoscopic gastrostomy (PEG) tube placement      Allergies  morphine (Rash)    SOCIAL HISTORY:  Pt is a former 1/2 PPD smoker ,  > 30 pack yrs. Quit in ; She denies current ETOH/drug use.   Reports her brother MIKAL takes care of her. Her Aunt JOSE G is her HCP  FAMILY HISTORY:  Family history of cardiomyopathy (Mother)    MEDICATIONS  (STANDING):  artificial tears (preservative free) Ophthalmic Solution 1 Drop(s) Both EYES two times a day  chlorhexidine 2% Cloths 1 Application(s) Topical daily  collagenase Ointment 1 Application(s) Topical daily  epoetin amee-epbx (RETACRIT) Injectable 57216 Unit(s) IV Push <User Schedule>  fluconAZOLE IVPB 200 milliGRAM(s) IV Intermittent every 24 hours  gabapentin 100 milliGRAM(s) Oral three times a day  heparin   Injectable 5000 Unit(s) SubCutaneous every 12 hours  hydroxychloroquine 200 milliGRAM(s) Oral daily  levETIRAcetam 500 milliGRAM(s) Oral daily  levETIRAcetam 250 milliGRAM(s) Oral <User Schedule>  metoprolol tartrate 12.5 milliGRAM(s) Oral two times a day  pantoprazole    Tablet 40 milliGRAM(s) Oral before breakfast  sevelamer carbonate 800 milliGRAM(s) Oral three times a day with meals  sodium chloride 0.9% lock flush 3 milliLiter(s) IV Push every 8 hours  vancomycin  IVPB 500 milliGRAM(s) IV Intermittent once    MEDICATIONS  (PRN):  acetaminophen     Tablet .. 650 milliGRAM(s) Oral every 6 hours PRN Temp greater or equal to 38.5C (101.3F), Mild Pain (1 - 3)  ALBUTerol    90 MICROgram(s) HFA Inhaler 2 Puff(s) Inhalation every 6 hours PRN Shortness of Breath and/or Wheezing  melatonin 3 milliGRAM(s) Oral at bedtime PRN Insomnia  oxyCODONE    IR 5 milliGRAM(s) Oral every 4 hours PRN Moderate Pain (4 - 6)  senna 2 Tablet(s) Oral at bedtime PRN Constipation      Vital Signs Last 24 Hrs  T(C): 36.4 (2022 08:43), Max: 37.6 (2022 00:45)  T(F): 97.6 (2022 08:43), Max: 99.7 (2022 00:45)  HR: 93 (2022 06:56) (92 - 145)  BP: 136/98 (2022 05:03) (114/76 - 162/100)  BP(mean): 112 (2022 05:03) (89 - 114)  RR: 19 (2022 06:56) (17 - 26)  SpO2: 95% (2022 06:56) (95% - 100%)    TELE:     SUBJECTIVE:    fever and pain free  sleepy, but awakens easily    REVIEW OF SYSTEMS:    Currently NO fever/ pain;   sacral decub +    PHYSICAL EXAM:    GENERAL: Comfortable positioned off loading sacral decub, denies pain  HEAD:  Atraumatic, Normocephalic  EYES: EOMI, PERRLA, conjunctiva and sclera clear  ENT: No tonsillar erythema, exudates, or enlargement; Moist mucous membranes, Good dentition, No lesions  NECK: Supple, No JVD, Normal thyroid, R IJ Shiley catheter in place  NERVOUS SYSTEM:  Alert & Oriented X 3, Good concentration; Motor Strength 5/5 B/L upper and lower extremities; DTRs 2+ intact and symmetric  CHEST/LUNG: Clear to percussion bilaterally; No rales, rhonchi, wheezing, or rubs  HEART: Regular rhythm, Tachycardia; No murmurs, rubs, or gallops  ABDOMEN: Soft, Nontender, Nondistended; Bowel sounds present  EXTREMITIES:  2+ Peripheral Pulses, No clubbing, cyanosis, or edema  SKIN: Warm, dry, stage IV sacral ulcer without locoregional tenderness, induration, fluctuance, no purulence      LABS:                        8.0    7.90  )-----------( 225      ( 2022 04:23 )             23.8     02-19    128<L>  |  93<L>  |  19.2  ----------------------------<  93  3.8   |  21.0<L>  |  4.00<H>    Ca    7.9<L>      2022 04:23  Mg     1.7         TPro  5.6<L>  /  Alb  2.6<L>  /  TBili  0.3<L>  /  DBili  x   /  AST  7   /  ALT  <5  /  AlkPhos  94  -    PT/INR - ( 2022 04:23 )   PT: 14.7 sec;   INR: 1.28 ratio         PTT - ( 2022 04:23 )  PTT:31.1 sec  Urinalysis Basic - ( 2022 23:01 )    Color: Yellow / Appearance: Slightly Turbid / S.010 / pH: x  Gluc: x / Ketone: Negative  / Bili: Negative / Urobili: Negative mg/dL   Blood: x / Protein: 30 mg/dL / Nitrite: Negative   Leuk Esterase: Trace / RBC: 0-2 /HPF / WBC 0-2 /HPF   Sq Epi: x / Non Sq Epi: Many / Bacteria: x        RADIOLOGY & ADDITIONAL STUDIES:    All images and reports reviewed by me CTICU D/G and Transfer to MEDICINE SERVICE    HPI:  41 year old female former smoker (PPD, quit 2021) with a medical history of Lupus, RA, ANCA vasculitis with ESRD on HD (M/W/F), GERD, seizures, epilepsy, depression, COPD, HTN, substance abuse (marijuana, cocaine), IVDA (heroin), C.diff colitis 2021, bacteremia, fungemia, resp failure s/p Trach/PEG 20 (since reversed), stage 4 sacral decub since , multiple episodes of seizures & syncope with HD, s/p hospitalization 12/15-21 for seizures (CSF negative), hospitalized at [facility] 22-22 after found unresponsive at home after being left alone for an hour, diagnosed with Aspiration PNA requiring intubation, LLL infiltrate s/p 7 day course of Vanco/Zosyn, discharged 22 and readmitted to [facility] 22 after LOC during HD. Hospital course complicated by MRSA bacteremia  (treated with IV Vanco), dialysis catheter tip thrombus noted with permacath removed on  and RIJ Shiley placed , C.abicans and C.tropicalis fungemia  (IV Caspofungin X 4 days transitioned to IV Diflucan ). Started on IV caspofungin . Repeat cultures  and 2/15 no growth to date. Switched to PO Diflucan . Patient with persistent fevers despite all of the above and so a subsequent GRAHAM  significant for multilobulated atrial mass attached to the septum, mild MR, mild TR, and an EF of 45-50%. Transferred to [facility] CTICU for further evaluation.   given patient's past and current issues with vascular access, risk for stenosis / fibrosis to vessels with repeated line changes, difficulty getting labs without such access, decision made not to remove Shiley and instead draw blood cultures from Shiley after dialysis     COURSE:  Evaluated by CTS. No Surgical interventions offered. Medical management per Signout.    PAST MEDICAL & SURGICAL HISTORY:  Rheumatoid arthritis  H/O Raynaud&#x27;s syndrome  Heroin abuse  Smoker  Sepsis  HTN (hypertension)  COPD (chronic obstructive pulmonary disease)  Depression  Epilepsy  GERD (gastroesophageal reflux disease)  Bacteremia  Systemic lupus erythematosus  Aphonia  H/O tubal ligation  H/O appendicitis  Gall bladder stones  H/O tracheostomy  S/P percutaneous endoscopic gastrostomy (PEG) tube placement      Allergies  morphine (Rash)    SOCIAL HISTORY:  Pt is a former 1/2 PPD smoker ,  > 30 pack yrs. Quit in ; She denies current ETOH/drug use.   Reports her brother MIKAL takes care of her. Her Aunt JOSE G is her HCP  FAMILY HISTORY:  Family history of cardiomyopathy (Mother)    MEDICATIONS  (STANDING):  artificial tears (preservative free) Ophthalmic Solution 1 Drop(s) Both EYES two times a day  chlorhexidine 2% Cloths 1 Application(s) Topical daily  collagenase Ointment 1 Application(s) Topical daily  epoetin amee-epbx (RETACRIT) Injectable 47265 Unit(s) IV Push <User Schedule>  fluconAZOLE IVPB 200 milliGRAM(s) IV Intermittent every 24 hours  gabapentin 100 milliGRAM(s) Oral three times a day  heparin   Injectable 5000 Unit(s) SubCutaneous every 12 hours  hydroxychloroquine 200 milliGRAM(s) Oral daily  levETIRAcetam 500 milliGRAM(s) Oral daily  levETIRAcetam 250 milliGRAM(s) Oral <User Schedule>  metoprolol tartrate 12.5 milliGRAM(s) Oral two times a day  pantoprazole    Tablet 40 milliGRAM(s) Oral before breakfast  sevelamer carbonate 800 milliGRAM(s) Oral three times a day with meals  sodium chloride 0.9% lock flush 3 milliLiter(s) IV Push every 8 hours  vancomycin  IVPB 500 milliGRAM(s) IV Intermittent once    MEDICATIONS  (PRN):  acetaminophen     Tablet .. 650 milliGRAM(s) Oral every 6 hours PRN Temp greater or equal to 38.5C (101.3F), Mild Pain (1 - 3)  ALBUTerol    90 MICROgram(s) HFA Inhaler 2 Puff(s) Inhalation every 6 hours PRN Shortness of Breath and/or Wheezing  melatonin 3 milliGRAM(s) Oral at bedtime PRN Insomnia  oxyCODONE    IR 5 milliGRAM(s) Oral every 4 hours PRN Moderate Pain (4 - 6)  senna 2 Tablet(s) Oral at bedtime PRN Constipation      Vital Signs Last 24 Hrs  T(C): 36.4 (2022 08:43), Max: 37.6 (2022 00:45)  T(F): 97.6 (2022 08:43), Max: 99.7 (2022 00:45)  HR: 93 (2022 06:56) (92 - 145)  BP: 136/98 (2022 05:03) (114/76 - 162/100)  BP(mean): 112 (2022 05:03) (89 - 114)  RR: 19 (2022 06:56) (17 - 26)  SpO2: 95% (2022 06:56) (95% - 100%)    TELE: Sinus Tachycardia through the night, now Normal rate    SUBJECTIVE:    fever and pain free  sleepy, but awakens easily  more concerned about her menu    REVIEW OF SYSTEMS:    Currently NO fever/ pain;   sacral decub +    PHYSICAL EXAM:    GENERAL: Comfortable, positioned off loading sacral decub, denies pain  HEAD:  Atraumatic, Normocephalic  EYES: EOMI, PERRLA, conjunctiva and sclera clear  ENT:  Moist mucous membranes, No lesions  NECK: Supple, No JVD, Normal thyroid, R IJ Shiley catheter in place  NERVOUS SYSTEM:  Alert & Oriented X 3, Good concentration; Motor Strength 5/5 B/L upper and lower extremities  CHEST/LUNG: CTA bilaterally; No rales, rhonchi, wheezing, or rubs; Right breast UL quadrant hard mass +, non tender  HEART: Regular rhythm, Tachycardia; No murmurs, rubs, or gallops  ABDOMEN: Soft, Nontender, Nondistended; Bowel sounds present  EXTREMITIES:  2+ Peripheral Pulses, No clubbing, cyanosis, or edema  SKIN: Warm, dry, stage IV sacral ulcer without locoregional tenderness, induration, fluctuance, no purulence      LABS:                        8.0    7.90  )-----------( 225      ( 2022 04:23 )             23.8     02-19    128<L>  |  93<L>  |  19.2  ----------------------------<  93  3.8   |  21.0<L>  |  4.00<H>    Ca    7.9<L>      2022 04:23  Mg     1.7         TPro  5.6<L>  /  Alb  2.6<L>  /  TBili  0.3<L>  /  DBili  x   /  AST  7   /  ALT  <5  /  AlkPhos  94  -    PT/INR - ( 2022 04:23 )   PT: 14.7 sec;   INR: 1.28 ratio         PTT - ( 2022 04:23 )  PTT:31.1 sec  Urinalysis Basic - ( 2022 23:01 )    Color: Yellow / Appearance: Slightly Turbid / S.010 / pH: x  Gluc: x / Ketone: Negative  / Bili: Negative / Urobili: Negative mg/dL   Blood: x / Protein: 30 mg/dL / Nitrite: Negative   Leuk Esterase: Trace / RBC: 0-2 /HPF / WBC 0-2 /HPF   Sq Epi: x / Non Sq Epi: Many / Bacteria: x        RADIOLOGY & ADDITIONAL STUDIES:    All images and reports reviewed by me

## 2022-02-19 NOTE — DISCHARGE NOTE PROVIDER - NSDCMRMEDTOKEN_GEN_ALL_CORE_FT
acetaminophen 325 mg oral tablet: 2 tab(s) orally every 6 hours, As needed, Temp greater or equal to 38.5C (101.3F), Mild Pain (1 - 3)  albuterol 90 mcg/inh inhalation aerosol: 2 puff(s) inhaled every 6 hours, As needed, Shortness of Breath and/or Wheezing  apixaban 2.5 mg oral tablet: 1 tab(s) orally 2 times a day  collagenase 250 units/g topical ointment: 1 application topically once a day  epoetin amee: 69189 unit(s) intravenous MWF  fluconazole: 200 milligram(s) intravenous every 24 hours  furosemide 100 mg/100 mL-0.9% intravenous solution: 40 milliliter(s) intravenous 2 times a day  gabapentin 100 mg oral capsule: 1 cap(s) orally 3 times a day  hydroxychloroquine 200 mg oral tablet: 1 tab(s) orally once a day  levETIRAcetam 250 mg oral tablet: 1 tab(s) orally 3 times a week, MWF at 1600  levETIRAcetam 500 mg oral tablet: 1 tab(s) orally once a day  melatonin 3 mg oral tablet: 1 tab(s) orally once a day (at bedtime), As needed, Insomnia  metoprolol tartrate 50 mg oral tablet: 1 tab(s) orally 2 times a day  ocular lubricant ophthalmic solution: 1 drop(s) to each affected eye 2 times a day  oxyCODONE 5 mg oral tablet: 1 tab(s) orally every 4 hours, As needed, Moderate Pain (4 - 6)  pantoprazole 40 mg oral delayed release tablet: 1 tab(s) orally once a day (before a meal)  senna oral tablet: 2 tab(s) orally once a day (at bedtime), As needed, Constipation  sevelamer carbonate 800 mg oral tablet: 1 tab(s) orally 3 times a day (with meals)  vancomycin 500 mg intravenous injection: 500 milligram(s) intravenous 3 times a week post dialysis

## 2022-02-19 NOTE — CONSULT NOTE ADULT - REASON FOR ADMISSION
Right atrial thrombus

## 2022-02-19 NOTE — DISCHARGE NOTE PROVIDER - NSDCFUADDINST_GEN_ALL_CORE_FT
Wound care carried forward from - Sacrum Wound: Apply Collagenase to wound bed and cover with a foam daily.

## 2022-02-19 NOTE — PATIENT PROFILE ADULT - FALL HARM RISK - HARM RISK INTERVENTIONS

## 2022-02-19 NOTE — CONSULT NOTE ADULT - ASSESSMENT
41y  Female former smoker (1/4PPD, quit 12/2021) with a medical history of Lupus, RA, ANCA vasculitis with ESRD on HD (M/W/F), GERD, seizures, epilepsy, depression, COPD, HTN, substance abuse (marijuana, cocaine), IVDA (heroin), C diff colitis 12/2021, bacteremia, fungemia, resp failure s/p Trach/PEG 7/13/20 (since reversed), stage 4 sacral decub since 2021, multiple episodes of seizures & syncope with HD, s/p hospitalization 12/15-12/18/21 for seizures (CSF negative), hospitalized at [facility] 1/18/22-2/2/22 after found unresponsive at home after being left alone for an hour, diagnosed with Aspiration PNA requiring intubation, LLL infiltrate s/p 7 day course of Vanco/Zosyn, discharged 2/2/22 and readmitted to [facility] 2/4/22 after LOC during HD. Hospital course complicated by MRSA bacteremia 2/4 (treated with IV Vanco), dialysis catheter tip thrombus noted with permacath removed and RIJ Shiley placed 2/11, C. abicans and C. tropicalis fungemia 2/11 (IV Caspofungin X 4 days transitioned to IV Diflucan 2/16). 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 Patient transferred [facility], rejected for cardiac surgery.      MRSA sepsis/bacteremia  Fungemia  Possible disseminated candidemia  Catheter-related infection  IVC thrombus  substance abuse  ESRD  Stage 4 sacral decub       - Since no CT surgery required, recommend transfer back to OSH   - Blood cultures 2/4 reporting MRSA bacteremia  - Blood cultures 2/11 reporting Candida Tropicalis and Candida albicans both sensitive to Fluconazole   - Fungemia cleared 2/12  - Repeat blood cultures ordered and will repeat till negative   - RVP/COVID 19 PCR negative 2/16  - GRAHAM at OSH reporting AV endocarditis   - TTE 2/18 reporting questionable AV endocarditis. + IVC thrombus and atrial mass  - Check GRAHAM   - Continue Vancomycin  - Monitor trough  - Monitor for Vancomycin toxicity   - Needs HD cath holiday, would remove post HD given positive blood cultures   - Follow up cultures  - Trend Fever  - Trend WBC  - recommend Palliative care consult       Thank you for allowing me to participate in the care of your patient.   Will Follow      d/w CT Surgery team, Skylar Stiles NP

## 2022-02-20 DIAGNOSIS — I38 ENDOCARDITIS, VALVE UNSPECIFIED: ICD-10-CM

## 2022-02-20 DIAGNOSIS — B49 UNSPECIFIED MYCOSIS: ICD-10-CM

## 2022-02-20 DIAGNOSIS — I33.0 ACUTE AND SUBACUTE INFECTIVE ENDOCARDITIS: ICD-10-CM

## 2022-02-20 DIAGNOSIS — I51.3 INTRACARDIAC THROMBOSIS, NOT ELSEWHERE CLASSIFIED: ICD-10-CM

## 2022-02-20 DIAGNOSIS — I50.23 ACUTE ON CHRONIC SYSTOLIC (CONGESTIVE) HEART FAILURE: ICD-10-CM

## 2022-02-20 LAB
CULTURE RESULTS: SIGNIFICANT CHANGE UP
GRAM STN FLD: SIGNIFICANT CHANGE UP
ORGANISM # SPEC MICROSCOPIC CNT: SIGNIFICANT CHANGE UP
ORGANISM # SPEC MICROSCOPIC CNT: SIGNIFICANT CHANGE UP
SPECIMEN SOURCE: SIGNIFICANT CHANGE UP

## 2022-02-20 PROCEDURE — 99233 SBSQ HOSP IP/OBS HIGH 50: CPT

## 2022-02-20 PROCEDURE — 93010 ELECTROCARDIOGRAM REPORT: CPT

## 2022-02-20 RX ORDER — DIPHENHYDRAMINE HCL 50 MG
25 CAPSULE ORAL ONCE
Refills: 0 | Status: COMPLETED | OUTPATIENT
Start: 2022-02-20 | End: 2022-02-20

## 2022-02-20 RX ORDER — MAGNESIUM SULFATE 500 MG/ML
1 VIAL (ML) INJECTION ONCE
Refills: 0 | Status: COMPLETED | OUTPATIENT
Start: 2022-02-20 | End: 2022-02-20

## 2022-02-20 RX ORDER — VANCOMYCIN HCL 1 G
1000 VIAL (EA) INTRAVENOUS ONCE
Refills: 0 | Status: COMPLETED | OUTPATIENT
Start: 2022-02-21 | End: 2022-02-21

## 2022-02-20 RX ORDER — ERYTHROPOIETIN 10000 [IU]/ML
10000 INJECTION, SOLUTION INTRAVENOUS; SUBCUTANEOUS
Refills: 0 | Status: DISCONTINUED | OUTPATIENT
Start: 2022-02-20 | End: 2022-02-25

## 2022-02-20 RX ADMIN — Medication 50 MILLIGRAM(S): at 21:30

## 2022-02-20 RX ADMIN — CHLORHEXIDINE GLUCONATE 1 APPLICATION(S): 213 SOLUTION TOPICAL at 12:30

## 2022-02-20 RX ADMIN — OXYCODONE HYDROCHLORIDE 5 MILLIGRAM(S): 5 TABLET ORAL at 21:31

## 2022-02-20 RX ADMIN — FLUCONAZOLE 200 MILLIGRAM(S): 150 TABLET ORAL at 12:23

## 2022-02-20 RX ADMIN — Medication 100 GRAM(S): at 14:26

## 2022-02-20 RX ADMIN — OXYCODONE HYDROCHLORIDE 5 MILLIGRAM(S): 5 TABLET ORAL at 16:47

## 2022-02-20 RX ADMIN — Medication 650 MILLIGRAM(S): at 19:39

## 2022-02-20 RX ADMIN — APIXABAN 2.5 MILLIGRAM(S): 2.5 TABLET, FILM COATED ORAL at 21:30

## 2022-02-20 RX ADMIN — OXYCODONE HYDROCHLORIDE 5 MILLIGRAM(S): 5 TABLET ORAL at 12:22

## 2022-02-20 RX ADMIN — Medication 1 APPLICATION(S): at 12:30

## 2022-02-20 RX ADMIN — OXYCODONE HYDROCHLORIDE 5 MILLIGRAM(S): 5 TABLET ORAL at 21:39

## 2022-02-20 RX ADMIN — Medication 25 MILLIGRAM(S): at 21:35

## 2022-02-20 RX ADMIN — OXYCODONE HYDROCHLORIDE 5 MILLIGRAM(S): 5 TABLET ORAL at 06:48

## 2022-02-20 RX ADMIN — SEVELAMER CARBONATE 800 MILLIGRAM(S): 2400 POWDER, FOR SUSPENSION ORAL at 16:48

## 2022-02-20 RX ADMIN — OXYCODONE HYDROCHLORIDE 5 MILLIGRAM(S): 5 TABLET ORAL at 07:25

## 2022-02-20 RX ADMIN — Medication 40 MILLIGRAM(S): at 17:10

## 2022-02-20 RX ADMIN — APIXABAN 2.5 MILLIGRAM(S): 2.5 TABLET, FILM COATED ORAL at 13:46

## 2022-02-20 RX ADMIN — Medication 650 MILLIGRAM(S): at 18:36

## 2022-02-20 RX ADMIN — GABAPENTIN 100 MILLIGRAM(S): 400 CAPSULE ORAL at 21:30

## 2022-02-20 RX ADMIN — Medication 40 MILLIGRAM(S): at 12:24

## 2022-02-20 RX ADMIN — Medication 200 MILLIGRAM(S): at 12:23

## 2022-02-20 RX ADMIN — Medication 3 MILLIGRAM(S): at 21:31

## 2022-02-20 RX ADMIN — LEVETIRACETAM 500 MILLIGRAM(S): 250 TABLET, FILM COATED ORAL at 12:23

## 2022-02-20 RX ADMIN — Medication 50 MILLIGRAM(S): at 12:24

## 2022-02-20 RX ADMIN — PANTOPRAZOLE SODIUM 40 MILLIGRAM(S): 20 TABLET, DELAYED RELEASE ORAL at 14:23

## 2022-02-20 RX ADMIN — SEVELAMER CARBONATE 800 MILLIGRAM(S): 2400 POWDER, FOR SUSPENSION ORAL at 13:46

## 2022-02-20 NOTE — CONSULT NOTE ADULT - SUBJECTIVE AND OBJECTIVE BOX
CHIEF COMPLAINT: fungemia ,     HPI:  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 (M/W/F), GERD, seizures, epilepsy, depression, COPD, HTN, substance abuse (marijuana, cocaine), IVDA (heroin), C. diff colitis 12/2021, bacteremia, fungemia, resp failure s/p Trach/PEG 7/13/20 (since reversed), stage 4 sacral decub since 2021, multiple episodes of seizures & syncope with HD, s/p hospitalization 12/15-12/18/21 for seizures (CSF negative), hospitalized at [facility] 1/18/22-2/2/22 after found unresponsive at home after being left alone for an hour, diagnosed with Aspiration PNA requiring intubation, LLL infiltrate s/p 7 day course of Vanco/Zosyn, discharged 2/2/22 and readmitted to [facility] 2/4/22 after LOC during HD. Hospital course complicated by MRSA bacteremia 2/4 (treated with IV Vanco), dialysis catheter tip thrombus noted with permacath removed and RIJ Shiley placed 2/11, C.abicans and C. tropicalis fungemia 2/11 (IV Caspofungin X 4 days transitioned to IV Diflucan 2/16). 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 Transferred to [facility] for CT surgery evaluation.  TTE at [facility] revealed LVEF 20-25%, Severely decreased global left ventricular systolic function, catheter seen in the right atrium with a highly mobile echogenicity visualized on aortic valve, thrombus, an mass or thrombus seen in the inferior vena cava measuring 3.9 x 1.5 x 1.4 cm. No intervention was planned per CT surgery. As per CTS the patient requires further Heme/Onc evaluation as she underwent a CT A/P at , which revealed an abnormal breast density and extensive lymphadenopathy. Additionally the patient will need eventual ischemic workup for decreased LVEF.    Patient  was started on eliquis during this admission for IVC thrombus ,  patient denies any chest pain or shortness of breath     PAST MEDICAL & SURGICAL HISTORY:  Sepsis    HTN (hypertension)    COPD (chronic obstructive pulmonary disease)    Depression    Epilepsy    GERD (gastroesophageal reflux disease)    Bacteremia    Systemic lupus erythematosus    Aphonia    H/O tubal ligation    H/O appendicitis    Gall bladder stones    H/O tracheostomy    S/P percutaneous endoscopic gastrostomy (PEG) tube placement        Allergies    morphine (Rash)    Intolerances        SOCIAL HISTORY:active illicit drug user     FAMILY HISTORY:  Family history of cardiomyopathy (Mother)        MEDICATIONS:  MEDICATIONS  (STANDING):  apixaban 2.5 milliGRAM(s) Oral two times a day  chlorhexidine 2% Cloths 1 Application(s) Topical daily  collagenase Ointment 1 Application(s) Topical daily  epoetin amee-epbx (RETACRIT) Injectable 70129 Unit(s) SubCutaneous <User Schedule>  fluconAZOLE   Tablet 200 milliGRAM(s) Oral daily  furosemide   Injectable 40 milliGRAM(s) IV Push two times a day  gabapentin 100 milliGRAM(s) Oral three times a day  hydroxychloroquine 200 milliGRAM(s) Oral daily  levETIRAcetam 500 milliGRAM(s) Oral daily  levETIRAcetam 250 milliGRAM(s) Oral <User Schedule>  magnesium sulfate  IVPB 1 Gram(s) IV Intermittent once  metoprolol tartrate 50 milliGRAM(s) Oral two times a day  pantoprazole   Suspension 40 milliGRAM(s) Oral daily  sevelamer carbonate 800 milliGRAM(s) Oral three times a day with meals    MEDICATIONS  (PRN):  acetaminophen     Tablet .. 650 milliGRAM(s) Oral every 6 hours PRN Temp greater or equal to 38C (100.4F), Mild Pain (1 - 3)  ALBUTerol    90 MICROgram(s) HFA Inhaler 2 Puff(s) Inhalation every 6 hours PRN Bronchospasm  artificial tears (preservative free) Ophthalmic Solution 1 Drop(s) Both EYES two times a day PRN Dry Eyes  melatonin 3 milliGRAM(s) Oral at bedtime PRN Insomnia  ondansetron Injectable 4 milliGRAM(s) IV Push every 8 hours PRN Nausea and/or Vomiting  oxyCODONE    IR 5 milliGRAM(s) Oral every 4 hours PRN Moderate Pain (4 - 6)  senna 2 Tablet(s) Oral at bedtime PRN Constipation      REVIEW OF SYSTEMS:    CONSTITUTIONAL: No weakness, fevers or chills  EYES/ENT: No visual changes;  No vertigo or throat pain   NECK: No pain or stiffness  RESPIRATORY: cough   CARDIOVASCULAR: No chest pain or palpitations  GASTROINTESTINAL: No abdominal or epigastric pain. No nausea, vomiting, or hematemesis; No diarrhea or constipation. No melena or hematochezia.  GENITOURINARY: No dysuria, frequency or hematuria  NEUROLOGICAL: crippled contractures  AA)x3   SKIN: No itching, burning, rashes, or lesions   All other review of systems is negative unless indicated above    Vital Signs Last 24 Hrs  T(C): 36.8 (20 Feb 2022 09:00), Max: 36.8 (20 Feb 2022 09:00)  T(F): 98.2 (20 Feb 2022 09:00), Max: 98.2 (20 Feb 2022 09:00)  HR: 93 (20 Feb 2022 09:00) (90 - 97)  BP: 157/105 (20 Feb 2022 09:00) (118/60 - 157/105)  BP(mean): 83 (19 Feb 2022 16:00) (83 - 110)  RR: 18 (20 Feb 2022 09:00) (14 - 19)  SpO2: 98% (20 Feb 2022 09:00) (97% - 100%)    I&O's Summary      PHYSICAL EXAM:    Constitutional: NAD, awake and alert, well-developed  HEENT: PERR, EOMI,  No oral cyananosis.  Neck:  supple,  No JVD  Respiratory: Breath sounds are clear bilaterally, No wheezing, rales or rhonchi  Cardiovascular: S1 and S2, regular rate and rhythm, no Murmurs, gallops or rubs  Gastrointestinal: Bowel Sounds present, soft, nontender.   Extremities: No peripheral edema. No clubbing or cyanosis.  Vascular: 2+ peripheral pulses  Neurological: A/O x 3, contractures  LE   Musculoskeletal: no calf tenderness.  Skin: decubitus ulcers       LABS: All Labs Reviewed:                        8.0    7.90  )-----------( 225      ( 19 Feb 2022 04:23 )             23.8                         8.8    7.19  )-----------( 249      ( 18 Feb 2022 08:11 )             26.9     19 Feb 2022 04:23    128    |  93     |  19.2   ----------------------------<  93     3.8     |  21.0   |  4.00   18 Feb 2022 08:11    131    |  99     |  12     ----------------------------<  107    3.5     |  24     |  3.17     Ca    7.9        19 Feb 2022 04:23  Ca    7.9        18 Feb 2022 08:11  Mg     1.7       19 Feb 2022 04:23    TPro  5.6    /  Alb  2.6    /  TBili  0.3    /  DBili  x      /  AST  7      /  ALT  <5     /  AlkPhos  94     19 Feb 2022 04:23    PT/INR - ( 19 Feb 2022 04:23 )   PT: 14.7 sec;   INR: 1.28 ratio         PTT - ( 19 Feb 2022 04:23 )  PTT:31.1 sec      Organism Blood Culture PCR  Gram Stain Blood -- Gram Stain   Growth in aerobic bottle: Yeast like cells  Specimen Source .Blood None  Culture-Blood --      Blood Culture: Organism Blood Culture PCR  Gram Stain Blood -- Gram Stain   Growth in aerobic bottle: Yeast like cells  Specimen Source .Blood None  Culture-Blood --      02-19 @ 04:23  Pro Bnp >68135    02-19 @ 04:23  TSH: 7.57      RADIOLOGY/EKG:    < from: TTE Echo Complete w/ Contrast w/ Doppler (02.18.22 @ 21:27) >    Summary:   1. Left ventricular ejection fraction, by visual estimation, is 20 to   25%.   2. Severely decreased global left ventricular systolic function.   3. Mildly reduced RV systolic function.   4. Catheter seen in the right atrium with a highly mobile echogenicity   visualized.   5. There is no evidence of pericardial effusion.   6. Mild thickening of the anterior and posterior mitral valve leaflets.   7. Moderate mitral valve regurgitation.   8. Mild buckling of the posterior mitral valve leaflet.   9. Moderate-severe tricuspid regurgitation.  10. Questionable small mobile echodensity visualized on the aortic valve.  11. Mild to moderate pulmonic valve regurgitation.  12. Estimated pulmonary artery systolic pressure is 39.8 mmHg assuming a   right atrial pressure of 8 mmHg, which is consistent with borderline   pulmonary hypertension.  13. Mass or thrombus seen inthe inferior vena cava measuring 3.9 x 1.5 x   1.4 cm.  14. Discussed with the Floor CT Surgery PA on 2/18/2022.  15. Recommendations: GRAHAM for further evaluation of Valvular disease and   echo densities noted on the echo.    MD Alexandrea Electronically signed on 2/19/2022 at 9:18:09 AM    < end of copied text >  < from: Transesophageal Echocardiogram (02.18.22 @ 10:41) >     There is a highly mobile, multilobulated, echogenic mass attached to the   right atrial septum consistent with a vegetation.   There is mild mitral valve regurgitation.   The aortic valve is trileaflet with thin pliable leaflets.   Trace aortic regurgitation is present.   Mild (1+) tricuspid valve regurgitation is present.   Trace pulmonic valvular regurgitation is present.   Estimated left ventricular ejection fraction is 45-50 %.    < end of copied text >    < from: 12 Lead ECG (02.12.22 @ 12:37) >  Sinus tachycardia  Otherwise normal ECG    < end of copied text >

## 2022-02-20 NOTE — DIETITIAN INITIAL EVALUATION ADULT. - ADD RECOMMEND
1) consider Liberalize diet to regular to maximize caloric and nutrient intake 2/2 poor PO intake/ malnutrition, 2) encourage protein-rich foods, maximize food preferences, 3) nephrovite daily to meet 100% RDA, 4) Recommend to add Vit C 500 mg BID (if medically feasible), add Zinc Sulfate 220 mg x 10 days to promote wound healing, 5) Monitor bowel movements, if no BM for >3 days, consider implementing bowel regimen, 6) confirm goals of care regarding nutrition support 2/2 chronically poor PO intake/ multiple hospitalizations. Pt may benefit from nutrition support to bridge w/ PO intake and optimize nutritional status. RD will continue to monitor PO intake, labs, hydration, and wt prn.

## 2022-02-20 NOTE — CONSULT NOTE ADULT - PROBLEM SELECTOR RECOMMENDATION 9
Patient with ESRD on hemodialysis developed bacteremia , fungemia , persistent , noted to right atrial mobile echogenic density  , IVC thrombosis , possible endocarditis thrombus , patient was evaluated by CT surgery Dr Raya at [facility] , ( I have discussed with him , as patient has multiple comorbid condition active drug abuse , decubitus   poor nutritional status  not a candidate for surgical intervention  who discussed with his colleague dr De Paz also )   continue IV antibiotic , antifungal   , anticoagulation eliquis (started this admission ) , monitor     Poor prognosis ,consider  palliative evaluation

## 2022-02-20 NOTE — CONSULT NOTE ADULT - SUBJECTIVE AND OBJECTIVE BOX
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 diff colitis 2021, bacteremia, fungemia,   stage 4 sacral decub since , multiple episodes of seizures & syncope with HD, s/p hospitalization  12/15-21 for seizures (CSF negative), hospitalized at [facility] 22-22 after found unresponsive at home after being left alone for an hour, diagnosed with Aspiration PNA requiring intubation, LLL infiltrate s/p 7 day course of Vanco/Zosyn, discharged 22 and readmitted to [facility] 22 after LOC during HD. Hospital course complicated by MRSA bacteremia  (treated with IV Vanco), dialysis catheter tip thrombus noted with permacath removed and RIJ Shiley placed , C.abicans and C. tropicalis fungemia  (IV Caspofungin  transitioned to  Diflucan ).   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 Transferred to [facility] for CT surgery evaluation on .   TTE at [facility] revealed LVEF 20-25%, Severely decreased global left ventricular systolic function, catheter seen in the right atrium with a highly mobile echogenicity visualized on aortic valve,  thrombus, an mass or thrombus seen in the inferior vena cava measuring 3.9 x 1.5 x 1.4 cm.   Pt was transferred back to   as no intervention was planned per CT surgery.   This AM - Cardiology Dr Palla spoke to Dr Raya  who confirmed this   In addition  patient requires further Heme/Onc evaluation as she underwent a CT   which revealed an abnormal breast density and extensive lymphadenopathy.     today    denies complaints at this time       MEDICATIONS  (STANDING):  apixaban 2.5 milliGRAM(s) Oral two times a day  chlorhexidine 2% Cloths 1 Application(s) Topical daily  collagenase Ointment 1 Application(s) Topical daily  epoetin amee-epbx (RETACRIT) Injectable 40297 Unit(s) SubCutaneous <User Schedule>  fluconAZOLE   Tablet 200 milliGRAM(s) Oral daily  furosemide   Injectable 40 milliGRAM(s) IV Push two times a day  gabapentin 100 milliGRAM(s) Oral three times a day  hydroxychloroquine 200 milliGRAM(s) Oral daily  levETIRAcetam 500 milliGRAM(s) Oral daily  levETIRAcetam 250 milliGRAM(s) Oral <User Schedule>  metoprolol tartrate 50 milliGRAM(s) Oral two times a day  pantoprazole   Suspension 40 milliGRAM(s) Oral daily  sevelamer carbonate 800 milliGRAM(s) Oral three times a day with meals      Allergies    morphine (Rash)    Intolerances      REVIEW OF SYSTEMS:    CONSTITUTIONAL: No weakness, fevers or chills  EYES/ENT: No visual changes;  No vertigo or throat pain   NECK: No pain or stiffness  RESPIRATORY: No cough, wheezing, hemoptysis; No shortness of breath  CARDIOVASCULAR: No chest pain or palpitations  GASTROINTESTINAL: No abdominal or epigastric pain. No nausea, vomiting, or hematemesis; No diarrhea or constipation. No melena or hematochezia.  GENITOURINARY: No dysuria, frequency or hematuria  NEUROLOGICAL: No numbness or weakness  SKIN: No itching, burning, rashes, or lesions   All other review of systems is negative unless indicated above.    VITAL:  Vital Signs Last 24 Hrs  T(C): 36.8 (2022 09:00), Max: 36.8 (2022 09:00)  T(F): 98.2 (2022 09:00), Max: 98.2 (2022 09:00)  HR: 93 (2022 09:00) (93 - 96)  BP: 157/105 (2022 09:00) (125/84 - 157/105)  BP(mean): --  RR: 18 (2022 09:00) (18 - 18)  SpO2: 98% (2022 09:00) (98% - 99%)        PHYSICAL EXAM:    Constitutional: frail, appears >> stated age  HEENT:  EOMI,  MM, poor dentition  Neck: No LAD, No JVD  Respiratory: dim  Cardiovascular: S1 and S2  Gastrointestinal: BS+, soft, NT/ND  Extremities: No peripheral edema  Neurological: A/O x 3, no focal deficits  : No Salgado  Skin: No rashes  Access: [facility]     LABS:                        8.0    7.90  )-----------( 225      ( 2022 04:23 )             23.8     02-19    128<L>  |  93<L>  |  19.2  ----------------------------<  93  3.8   |  21.0<L>  |  4.00<H>    Ca    7.9<L>      2022 04:23  Mg     1.7         TPro  5.6<L>  /  Alb  2.6<L>  /  TBili  0.3<L>  /  DBili  x   /  AST  7   /  ALT  <5  /  AlkPhos  94        Urine Studies:  Urinalysis Basic - ( 2022 23:01 )    Color: Yellow / Appearance: Slightly Turbid / S.010 / pH: x  Gluc: x / Ketone: Negative  / Bili: Negative / Urobili: Negative mg/dL   Blood: x / Protein: 30 mg/dL / Nitrite: Negative   Leuk Esterase: Trace / RBC: 0-2 /HPF / WBC 0-2 /HPF   Sq Epi: x / Non Sq Epi: Many / Bacteria: x      RADIOLOGY & ADDITIONAL STUDIES:

## 2022-02-20 NOTE — DIETITIAN INITIAL EVALUATION ADULT. - OTHER INFO
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 diff colitis 12/2021, bacteremia, fungemia, resp failure s/p Trach/PEG 7/13/20 (since reversed), stage 4 sacral decub since 2021, multiple episodes of seizures & syncope with HD, s/p hospitalization 12/15-12/18/21 for seizures (CSF negative), hospitalized at [facility] 1/18/22-2/2/22 after found unresponsive at home after being left alone for an hour, diagnosed with Aspiration PNA requiring intubation, discharged 2/2/22 and readmitted to [facility] 2/4/22 after LOC during HD. Hospital course complicated by MRSA bacteremia 2/4, dialysis catheter tip thrombus noted with permacath removed and RIJ Shiley placed 2/11, C.abicans and C. tropicalis fungemia 2/11. 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 Transferred to [facility] for CT surgery evaluation. No intervention was planned per CT surgery. As per CTS the patient requires further Heme/Onc evaluation as she underwent a CT A/P at , which revealed an abnormal breast density and extensive lymphadenopathy.    Pt known to RD service, last admission had extremely poor PO intake and severe wt loss. Noted w/ severe muscle/ fat wasting, continuing to meet criteria for PCM. Endorses nausea/ vomiting and diarrhea x 1 week. RD took bed scale wt at 97 kg - continues to lose wt. Last admit wt on 2/5/22 102# - unintentional wt loss of 5#/ 5% x 2 weeks (severe and clinically significant). Pt denies oral nutrition supplements. RD encouraged protein-rich foods. Would rec'd to liberalize diet to regular if PO intake is not meeting >/= 80% of ENN. See other recommendations below.

## 2022-02-20 NOTE — DIETITIAN INITIAL EVALUATION ADULT. - ORAL INTAKE PTA/DIET HISTORY
Pt reports eating 3 meals/ day. Is not drinking oral nutrition supplements. States PO intake has improved since last admit; however, pt continues to lose wt - ? accuracy. Meals are carbohydrate-heavy, lacking protein-rich foods in diet. Endorses following low K, low Phos, low Na diet more strictly.

## 2022-02-20 NOTE — DIETITIAN INITIAL EVALUATION ADULT. - PHYSCIAL ASSESSMENT
Scot score 14  PU coccyx/ sacrum unstageable  No BM documented; pt reports diarrhea thin, frail, ruthy/underweight/emaciated/other (specify)

## 2022-02-20 NOTE — CONSULT NOTE ADULT - ASSESSMENT
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 diff colitis 12/2021, bacteremia, fungemia, resp failure s/p Trach/PEG 7/13/20 (since reversed), stage 4 sacral decub since 2021, multiple episodes of seizures & syncope with HD, s/p hospitalization 12/15-12/18/21 for seizures (CSF negative), hospitalized at [facility] 1/18/22-2/2/22 after found unresponsive at home after being left alone for an hour, diagnosed with Aspiration PNA requiring intubation, LLL infiltrate s/p 7 day course of Vanco/Zosyn, discharged 2/2/22 and readmitted to [facility] 2/4/22 after LOC during HD. Hospital course complicated by MRSA bacteremia 2/4 (treated with IV Vanco), dialysis catheter tip thrombus noted with permacath removed and RIJ Shiley placed 2/11, C.abicans and C. tropicalis fungemia 2/11 (IV Caspofungin X 4 days transitioned to IV Diflucan 2/16). 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 Transferred to [facility] for CT surgery evaluation.  TTE at [facility] revealed LVEF 20-25%, Severely decreased global left ventricular systolic function, catheter seen in the right atrium with a highly mobile echogenicity visualized on aortic valve, thrombus, an mass or thrombus seen in the inferior vena cava measuring 3.9 x 1.5 x 1.4 cm. No intervention was planned per CT surgery. As per CTS the patient requires further Heme/Onc evaluation as she underwent a CT A/P at , which revealed an abnormal breast density and extensive lymphadenopathy. Additionally the patient will need eventual ischemic workup for decreased LVEF; therefore she was readmitted back to  on 2/19. Blood cultures from 2/17 are still growing yeast, and Coag negative staph. The patient is frustrated with medical problems, has no idea what will happen to her.     1. Patient admitted with persistent fungemia, bacteremia in setting of endocarditis and infected lines for dialysis access; multiple other medical problems that complicate treating the patient  - will need the shiley catheter removed; after discussion with renal patient will have dialysis on 2/21, followed by vancomycin one gram times one  - will continue diflucan 200 mg po daily, diflucan bioavailability is same po as iv  - the timing of the replacement of the next shiley to be determined based on clearance of the blood  - will need cardiology consultation regarding endocarditis as patient meets criteria for cardiovascular surgery, perhaps second opinion or rendering from  of the Department  - consideration for Breast surgery evaluation  - consideration for palliative care evaluation; or psychiatry for supportive care  - d/w hospitalist, nephrology  2. other issues: per medicine

## 2022-02-20 NOTE — CONSULT NOTE ADULT - CONSULT REASON
Heart failure, highly mobile echogenicity visualized on aortic valve, thrombus, an mass or thrombus seen in the inferior vena cava measuring 3.9 x 1.5 x 1.4 cm. cardiac vegetation

## 2022-02-20 NOTE — CONSULT NOTE ADULT - SUBJECTIVE AND OBJECTIVE BOX
REASON FOR CONSULT: Heart failure, endocarditis     CHIEF COMPLAINT: LOC during HD    HPI: 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 (M/W/F), GERD, seizures, epilepsy, depression, COPD, HTN, substance abuse (marijuana, cocaine), IVDA (heroin), C. diff colitis 12/2021, bacteremia, fungemia, resp failure s/p Trach/PEG 7/13/20 (since reversed), stage 4 sacral decub since 2021, multiple episodes of seizures & syncope with HD, s/p hospitalization 12/15-12/18/21 for seizures (CSF negative), hospitalized at [facility] 1/18/22-2/2/22 after found unresponsive at home after being left alone for an hour, diagnosed with Aspiration PNA requiring intubation, LLL infiltrate s/p 7 day course of Vanco/Zosyn, discharged 2/2/22 and readmitted to [facility] 2/4/22 after LOC during HD. Hospital course complicated by MRSA bacteremia 2/4 (treated with IV Vanco), dialysis catheter tip thrombus noted with permacath removed and RIJ Shiley placed 2/11, C.abicans and C. tropicalis fungemia 2/11 (IV Caspofungin X 4 days transitioned to IV Diflucan 2/16). 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 Transferred to [facility] for CT surgery evaluation.  TTE at [facility] revealed LVEF 20-25%, Severely decreased global left ventricular systolic function, catheter seen in the right atrium with a highly mobile echogenicity visualized on aortic valve, thrombus, an mass or thrombus seen in the inferior vena cava measuring 3.9 x 1.5 x 1.4 cm. No intervention was planned per CT surgery. As per CTS the patient requires further Heme/Onc evaluation as she underwent a CT A/P at , which revealed an abnormal breast density and extensive lymphadenopathy. Additionally the patient will need eventual ischemic workup for decreased LVEF. (19 Feb 2022 19:23)    Cardiology consulted. TTE completed on 2/18 revealing a very mobile, moderate sized, multilobulated echogenic structure attached to the right atrial septum consistent with endocarditis.  Left ventricular ejection fraction 45%.Mild mitral valve regurgitation. Trace aortic insufficiency. patient then transferred to [facility], seen by Dr. Raya. No Surgical interventions offered. Medical management       PAST MEDICAL & SURGICAL HISTORY:  Rheumatoid arthritis  H/O Raynaud&#x27;s syndrome  Heroin abuse  Smoker  Sepsis  HTN (hypertension)  COPD (chronic obstructive pulmonary disease)  Depression  Epilepsy  GERD (gastroesophageal reflux disease)  Bacteremia  Systemic lupus erythematosus  Aphonia  H/O tubal ligation  H/O appendicitis  Gall bladder stones  H/O tracheostomy  S/P percutaneous endoscopic gastrostomy (PEG) tube placement    Allergies:  morphine (Rash)  Intolerances: none    SOCIAL HISTORY:  Former 1/2 PPD smoker ,  > 30 pack yrs  Dhe denies current ETOH/drug use  Reports her brother takes care of her.    FAMILY HISTORY:  Family history of cardiomyopathy (Mother)      Medications:  apixaban 2.5 milliGRAM(s) Oral two times a day  chlorhexidine 2% Cloths 1 Application(s) Topical daily  collagenase Ointment 1 Application(s) Topical daily  fluconAZOLE   Tablet 200 milliGRAM(s) Oral daily  furosemide   Injectable 40 milliGRAM(s) IV Push two times a day  gabapentin 100 milliGRAM(s) Oral three times a day  hydroxychloroquine 200 milliGRAM(s) Oral daily  levETIRAcetam 500 milliGRAM(s) Oral daily  levETIRAcetam 250 milliGRAM(s) Oral <User Schedule>  magnesium sulfate  IVPB 1 Gram(s) IV Intermittent once  metoprolol tartrate 50 milliGRAM(s) Oral two times a day  pantoprazole   Suspension 40 milliGRAM(s) Oral daily  sevelamer carbonate 800 milliGRAM(s) Oral three times a day with meals    MEDICATIONS  (PRN):  acetaminophen     Tablet .. 650 milliGRAM(s) Oral every 6 hours PRN Temp greater or equal to 38C (100.4F), Mild Pain (1 - 3)  ALBUTerol    90 MICROgram(s) HFA Inhaler 2 Puff(s) Inhalation every 6 hours PRN Bronchospasm  artificial tears (preservative free) Ophthalmic Solution 1 Drop(s) Both EYES two times a day PRN Dry Eyes  melatonin 3 milliGRAM(s) Oral at bedtime PRN Insomnia  ondansetron Injectable 4 milliGRAM(s) IV Push every 8 hours PRN Nausea and/or Vomiting  oxyCODONE    IR 5 milliGRAM(s) Oral every 4 hours PRN Moderate Pain (4 - 6)  senna 2 Tablet(s) Oral at bedtime PRN Constipation    Home Medications:  acetaminophen 325 mg oral tablet: 2 tab(s) orally every 6 hours, As needed, Temp greater or equal to 38.5C (101.3F), Mild Pain (1 - 3) (19 Feb 2022 21:35)  apixaban 2.5 mg oral tablet: 1 tab(s) orally 2 times a day (19 Feb 2022 21:35)  collagenase 250 units/g topical ointment: 1 application topically once a day (19 Feb 2022 21:35)  epoetin amee: [id number] unit(s) intravenous MWF (19 Feb 2022 21:35)  fluconazole 200 mg oral tablet: 1 tab(s) intravenous once a day (19 Feb 2022 21:35)  furosemide 10 mg/mL injectable solution: 40 milligram(s) intravenous 2 times a day (19 Feb 2022 21:35)  gabapentin 100 mg oral capsule: 1 cap(s) orally 3 times a day (19 Feb 2022 21:35)  hydroxychloroquine 200 mg oral tablet: 1 tab(s) orally once a day (19 Feb 2022 21:35)  levETIRAcetam 250 mg oral tablet: 1 tab(s) orally 3 times a week, MWF at 1600 (19 Feb 2022 21:35)  levETIRAcetam 500 mg oral tablet: 1 tab(s) orally once a day (19 Feb 2022 21:35)  melatonin 3 mg oral tablet: 1 tab(s) orally once a day (at bedtime), As needed, Insomnia (19 Feb 2022 21:35)  metoprolol tartrate 50 mg oral tablet: 1 tab(s) orally 2 times a day (19 Feb 2022 21:35)  ocular lubricant ophthalmic solution: 1 drop(s) to each affected eye 2 times a day (19 Feb 2022 21:35)  oxyCODONE 5 mg oral tablet: 1 tab(s) orally every 4 hours, As needed, Moderate Pain (4 - 6) (19 Feb 2022 21:35)  pantoprazole 40 mg oral delayed release tablet: 1 tab(s) orally once a day (before a meal) (19 Feb 2022 21:35)  senna oral tablet: 2 tab(s) orally once a day (at bedtime), As needed, Constipation (19 Feb 2022 21:35)  sevelamer carbonate 800 mg oral tablet: 1 tab(s) orally 3 times a day (with meals) (19 Feb 2022 21:35)  vancomycin 500 mg intravenous injection: 500 milligram(s) intravenous 3 times a week post dialysis (19 Feb 2022 21:35)    REVIEW OF SYSTEMS:  CONSTITUTIONAL: No weakness, fevers or chills  EYES/ENT: No visual changes;  No vertigo or throat pain   NECK: No pain or stiffness  RESPIRATORY: No cough, wheezing, hemoptysis; No shortness of breath  CARDIOVASCULAR: No chest pain or palpitations  GASTROINTESTINAL: No abdominal or epigastric pain. No nausea, vomiting, or hematemesis; No diarrhea or constipation. No melena or hematochezia.  GENITOURINARY: No dysuria, frequency or hematuria  NEUROLOGICAL: No numbness or weakness  SKIN: No itching, burning, rashes, or lesions   All other review of systems is negative unless indicated above      PHYSICAL EXAM:  T(C): 36.8 (20 Feb 2022 09:00), Max: 36.8 (20 Feb 2022 09:00)  T(F): 98.2 (20 Feb 2022 09:00), Max: 98.2 (20 Feb 2022 09:00)  HR: 93 (20 Feb 2022 09:00) (90 - 97)  BP: 157/105 (20 Feb 2022 09:00) (118/60 - 157/105)  BP(mean): 83 (19 Feb 2022 16:00) (83 - 110)  RR: 18 (20 Feb 2022 09:00) (14 - 19)  SpO2: 98% (20 Feb 2022 09:00) (97% - 100%)    Constitutional: NAD, awake and alert, well-developed  HEENT: PERR, EOMI,  No oral cyananosis.  Neck:  supple,  No JVD  Respiratory: Breath sounds are clear bilaterally, No wheezing, rales or rhonchi  Cardiovascular: S1 and S2, regular rate and rhythm, no Murmurs, gallops or rubs  Gastrointestinal: Bowel Sounds present, soft, nontender.   Extremities: No peripheral edema. No clubbing or cyanosis.  Vascular: 2+ peripheral pulses  Neurological: A/O x 3, no focal deficits  Musculoskeletal: no calf tenderness.  Skin: No rashes.      Labs:                        8.0    7.90  )-----------( 225      ( 19 Feb 2022 04:23 )             23.8                         8.8    7.19  )-----------( 249      ( 18 Feb 2022 08:11 )             26.9     19 Feb 2022 04:23    128    |  93     |  19.2   ----------------------------<  93     3.8     |  21.0   |  4.00   18 Feb 2022 08:11    131    |  99     |  12     ----------------------------<  107    3.5     |  24     |  3.17     Ca    7.9        19 Feb 2022 04:23  Ca    7.9        18 Feb 2022 08:11  Mg     1.7       19 Feb 2022 04:23    TPro  5.6    /  Alb  2.6    /  TBili  0.3    /  DBili  x      /  AST  7      /  ALT  <5     /  AlkPhos  94     19 Feb 2022 04:23    PT/INR - ( 19 Feb 2022 04:23 )   PT: 14.7 sec;   INR: 1.28 ratio      PTT - ( 19 Feb 2022 04:23 )  PTT:31.1 sec    Cardiac Testing:    BNP 2/19 >58992    Tele Sinus tachycardia,     EKG 2/12: Sinus tachycardia    GRAHAM: 2/18:  There is a very mobile, moderate sized, multilobulated echogenic structure attached to the right atrial septum consistent with endocarditis.  Left ventricular ejection fraction 45%. Mild mitral valve regurgitation.  Trace aortic insufficiency.    TTE 2/8 : The left ventricle is normal in size. Moderately reduced left ventricular systolic function. Estimated left ventricular ejection fraction is 40%. Normal appearing right atrium. A catheter wire is seen in the right atrium. Normal aortic valve structure and function. Trivial aortic regurgitation is present. Normal appearing mitral valve structure and function. Mild (1+) mitral regurgitation is present.    Additional Radiology:    CT chest 2/12: Near complete resolution of previously identified mucoid debris in bronchus intermedius and bilateral lower lobe bronchi with tree-in-bud nodularity, right greater than left. No consolidation or pleural effusion. Asymmetric right breast density with nonspecific soft tissue infiltration extending to right chest wall.    CT abd/pelvis 2/12: Rectal tube identified, tip at the level of the rectosigmoid junction containing contrast. Mild wall thickening of the rectosigmoid junction. No bowel obstruction. Reidentified bulky retroperitoneal lymphadenopathy with cluster of multiple enlarged left paraaortic and pelvic lymph nodes, stable compared to recent prior study.     REASON FOR CONSULT: Heart failure, endocarditis     CHIEF COMPLAINT: LOC during HD    HPI: 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 (M/W/F), GERD, seizures, epilepsy, depression, COPD, HTN, substance abuse (marijuana, cocaine), IVDA (heroin), C. diff colitis 12/2021, bacteremia, fungemia, resp failure s/p Trach/PEG 7/13/20 (since reversed), stage 4 sacral decub since 2021, multiple episodes of seizures & syncope with HD, s/p hospitalization 12/15-12/18/21 for seizures (CSF negative), hospitalized at [facility] 1/18/22-2/2/22 after found unresponsive at home after being left alone for an hour, diagnosed with Aspiration PNA requiring intubation, LLL infiltrate s/p 7 day course of Vanco/Zosyn, discharged 2/2/22 and readmitted to [facility] 2/4/22 after LOC during HD. Hospital course complicated by MRSA bacteremia 2/4 (treated with IV Vanco), dialysis catheter tip thrombus noted with permacath removed and RIJ Shiley placed 2/11, C.abicans and C. tropicalis fungemia 2/11 (IV Caspofungin X 4 days transitioned to IV Diflucan 2/16). 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 Transferred to [facility] for CT surgery evaluation.  TTE at [facility] revealed LVEF 20-25%, Severely decreased global left ventricular systolic function, catheter seen in the right atrium with a highly mobile echogenicity visualized on aortic valve, thrombus, an mass or thrombus seen in the inferior vena cava measuring 3.9 x 1.5 x 1.4 cm. No intervention was planned per CT surgery. As per CTS the patient requires further Heme/Onc evaluation as she underwent a CT A/P at , which revealed an abnormal breast density and extensive lymphadenopathy. Additionally the patient will need eventual ischemic workup for decreased LVEF. (19 Feb 2022 19:23)    Cardiology consulted. TTE completed on 2/18 revealing a very mobile, moderate sized, multilobulated echogenic structure attached to the right atrial septum consistent with endocarditis.  Left ventricular ejection fraction 45%.Mild mitral valve regurgitation. Trace aortic insufficiency. patient then transferred to [facility], seen by Dr. Raya. No Surgical interventions offered. Medical management       PAST MEDICAL & SURGICAL HISTORY:  Rheumatoid arthritis  H/O Raynaud&#x27;s syndrome  Heroin abuse  Smoker  Sepsis  HTN (hypertension)  COPD (chronic obstructive pulmonary disease)  Depression  Epilepsy  GERD (gastroesophageal reflux disease)  Bacteremia  Systemic lupus erythematosus  Aphonia  H/O tubal ligation  H/O appendicitis  Gall bladder stones  H/O tracheostomy  S/P percutaneous endoscopic gastrostomy (PEG) tube placement    Allergies:  morphine (Rash)  Intolerances: none    SOCIAL HISTORY:  Former 1/2 PPD smoker ,  > 30 pack yrs  Dhe denies current ETOH/drug use  Reports her brother takes care of her.    FAMILY HISTORY:  Family history of cardiomyopathy (Mother)      Medications:  apixaban 2.5 milliGRAM(s) Oral two times a day  chlorhexidine 2% Cloths 1 Application(s) Topical daily  collagenase Ointment 1 Application(s) Topical daily  fluconAZOLE   Tablet 200 milliGRAM(s) Oral daily  furosemide   Injectable 40 milliGRAM(s) IV Push two times a day  gabapentin 100 milliGRAM(s) Oral three times a day  hydroxychloroquine 200 milliGRAM(s) Oral daily  levETIRAcetam 500 milliGRAM(s) Oral daily  levETIRAcetam 250 milliGRAM(s) Oral <User Schedule>  magnesium sulfate  IVPB 1 Gram(s) IV Intermittent once  metoprolol tartrate 50 milliGRAM(s) Oral two times a day  pantoprazole   Suspension 40 milliGRAM(s) Oral daily  sevelamer carbonate 800 milliGRAM(s) Oral three times a day with meals    MEDICATIONS  (PRN):  acetaminophen     Tablet .. 650 milliGRAM(s) Oral every 6 hours PRN Temp greater or equal to 38C (100.4F), Mild Pain (1 - 3)  ALBUTerol    90 MICROgram(s) HFA Inhaler 2 Puff(s) Inhalation every 6 hours PRN Bronchospasm  artificial tears (preservative free) Ophthalmic Solution 1 Drop(s) Both EYES two times a day PRN Dry Eyes  melatonin 3 milliGRAM(s) Oral at bedtime PRN Insomnia  ondansetron Injectable 4 milliGRAM(s) IV Push every 8 hours PRN Nausea and/or Vomiting  oxyCODONE    IR 5 milliGRAM(s) Oral every 4 hours PRN Moderate Pain (4 - 6)  senna 2 Tablet(s) Oral at bedtime PRN Constipation    Home Medications:  acetaminophen 325 mg oral tablet: 2 tab(s) orally every 6 hours, As needed, Temp greater or equal to 38.5C (101.3F), Mild Pain (1 - 3) (19 Feb 2022 21:35)  apixaban 2.5 mg oral tablet: 1 tab(s) orally 2 times a day (19 Feb 2022 21:35)  collagenase 250 units/g topical ointment: 1 application topically once a day (19 Feb 2022 21:35)  epoetin amee: 44428 unit(s) intravenous MWF (19 Feb 2022 21:35)  fluconazole 200 mg oral tablet: 1 tab(s) intravenous once a day (19 Feb 2022 21:35)  furosemide 10 mg/mL injectable solution: 40 milligram(s) intravenous 2 times a day (19 Feb 2022 21:35)  gabapentin 100 mg oral capsule: 1 cap(s) orally 3 times a day (19 Feb 2022 21:35)  hydroxychloroquine 200 mg oral tablet: 1 tab(s) orally once a day (19 Feb 2022 21:35)  levETIRAcetam 250 mg oral tablet: 1 tab(s) orally 3 times a week, MWF at 1600 (19 Feb 2022 21:35)  levETIRAcetam 500 mg oral tablet: 1 tab(s) orally once a day (19 Feb 2022 21:35)  melatonin 3 mg oral tablet: 1 tab(s) orally once a day (at bedtime), As needed, Insomnia (19 Feb 2022 21:35)  metoprolol tartrate 50 mg oral tablet: 1 tab(s) orally 2 times a day (19 Feb 2022 21:35)  ocular lubricant ophthalmic solution: 1 drop(s) to each affected eye 2 times a day (19 Feb 2022 21:35)  oxyCODONE 5 mg oral tablet: 1 tab(s) orally every 4 hours, As needed, Moderate Pain (4 - 6) (19 Feb 2022 21:35)  pantoprazole 40 mg oral delayed release tablet: 1 tab(s) orally once a day (before a meal) (19 Feb 2022 21:35)  senna oral tablet: 2 tab(s) orally once a day (at bedtime), As needed, Constipation (19 Feb 2022 21:35)  sevelamer carbonate 800 mg oral tablet: 1 tab(s) orally 3 times a day (with meals) (19 Feb 2022 21:35)  vancomycin 500 mg intravenous injection: 500 milligram(s) intravenous 3 times a week post dialysis (19 Feb 2022 21:35)    REVIEW OF SYSTEMS:  CONSTITUTIONAL: No weakness, fevers or chills  EYES/ENT: No visual changes;  No vertigo or throat pain   NECK: No pain or stiffness  RESPIRATORY: No cough, wheezing, hemoptysis; No shortness of breath  CARDIOVASCULAR: No chest pain or palpitations  GASTROINTESTINAL: No abdominal or epigastric pain. No nausea, vomiting, or hematemesis; No diarrhea or constipation. No melena or hematochezia.  GENITOURINARY: No dysuria, frequency or hematuria  NEUROLOGICAL: No numbness or weakness  SKIN: No itching, burning, rashes, or lesions   All other review of systems is negative unless indicated above      PHYSICAL EXAM:  T(C): 36.8 (20 Feb 2022 09:00), Max: 36.8 (20 Feb 2022 09:00)  T(F): 98.2 (20 Feb 2022 09:00), Max: 98.2 (20 Feb 2022 09:00)  HR: 93 (20 Feb 2022 09:00) (90 - 97)  BP: 157/105 (20 Feb 2022 09:00) (118/60 - 157/105)  BP(mean): 83 (19 Feb 2022 16:00) (83 - 110)  RR: 18 (20 Feb 2022 09:00) (14 - 19)  SpO2: 98% (20 Feb 2022 09:00) (97% - 100%)    Constitutional: frail looking  HEENT: NC/AT, EOMI, PERRLA, conjunctivae clear; ears and nose atraumatic; pharynx clear; poor dentition  Neck: supple; thyroid not palpable; right neck shiley   Back: no tenderness  Respiratory: respiratory effort normal; clear to auscultation  Cardiovascular: S1S2 regular, 2/6 systolic murmur  Abdomen: soft, not tender, not distended, positive BS; no liver or spleen organomegaly  Genitourinary: no suprapubic tenderness  Musculoskeletal: no muscle tenderness, no joint swelling or tenderness  Neurological/ Psychiatric:   moving all extremities  Skin: no rashes; no palpable lesions; extreme scarring of extremities    Labs:                        8.0    7.90  )-----------( 225      ( 19 Feb 2022 04:23 )             23.8                         8.8    7.19  )-----------( 249      ( 18 Feb 2022 08:11 )             26.9     19 Feb 2022 04:23    128    |  93     |  19.2   ----------------------------<  93     3.8     |  21.0   |  4.00   18 Feb 2022 08:11    131    |  99     |  12     ----------------------------<  107    3.5     |  24     |  3.17     Ca    7.9        19 Feb 2022 04:23  Ca    7.9        18 Feb 2022 08:11  Mg     1.7       19 Feb 2022 04:23    TPro  5.6    /  Alb  2.6    /  TBili  0.3    /  DBili  x      /  AST  7      /  ALT  <5     /  AlkPhos  94     19 Feb 2022 04:23    PT/INR - ( 19 Feb 2022 04:23 )   PT: 14.7 sec;   INR: 1.28 ratio      PTT - ( 19 Feb 2022 04:23 )  PTT:31.1 sec    Cardiac Testing:    BNP 2/19 >57324    Tele Sinus tachycardia,     EKG 2/12: Sinus tachycardia    GRAHAM: 2/18:  There is a very mobile, moderate sized, multilobulated echogenic structure attached to the right atrial septum consistent with endocarditis.  Left ventricular ejection fraction 45%. Mild mitral valve regurgitation.  Trace aortic insufficiency.    TTE 2/8 : The left ventricle is normal in size. Moderately reduced left ventricular systolic function. Estimated left ventricular ejection fraction is 40%. Normal appearing right atrium. A catheter wire is seen in the right atrium. Normal aortic valve structure and function. Trivial aortic regurgitation is present. Normal appearing mitral valve structure and function. Mild (1+) mitral regurgitation is present.    Additional Radiology:    CT chest 2/12: Near complete resolution of previously identified mucoid debris in bronchus intermedius and bilateral lower lobe bronchi with tree-in-bud nodularity, right greater than left. No consolidation or pleural effusion. Asymmetric right breast density with nonspecific soft tissue infiltration extending to right chest wall.    CT abd/pelvis 2/12: Rectal tube identified, tip at the level of the rectosigmoid junction containing contrast. Mild wall thickening of the rectosigmoid junction. No bowel obstruction. Reidentified bulky retroperitoneal lymphadenopathy with cluster of multiple enlarged left paraaortic and pelvic lymph nodes, stable compared to recent prior study.     REASON FOR CONSULT:  cardiac vegetation    CHIEF COMPLAINT: LOC during HD    HPI: 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 (M/W/F), GERD, seizures, epilepsy, depression, COPD, HTN, substance abuse (marijuana, cocaine), IVDA (heroin), C. diff colitis 12/2021, bacteremia, fungemia, resp failure s/p Trach/PEG 7/13/20 (since reversed), stage 4 sacral decub since 2021, multiple episodes of seizures & syncope with HD, s/p hospitalization 12/15-12/18/21 for seizures (CSF negative), hospitalized at [facility] 1/18/22-2/2/22 after found unresponsive at home after being left alone for an hour, diagnosed with Aspiration PNA requiring intubation, LLL infiltrate s/p 7 day course of Vanco/Zosyn, discharged 2/2/22 and readmitted to [facility] 2/4/22 after LOC during HD. Hospital course complicated by MRSA bacteremia 2/4 (treated with IV Vanco), dialysis catheter tip thrombus noted with permacath removed and RIJ Shiley placed 2/11, C.abicans and C. tropicalis fungemia 2/11 (IV Caspofungin X 4 days transitioned to IV Diflucan 2/16). 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 Transferred to [facility] for CT surgery evaluation.  TTE at [facility] revealed LVEF 20-25%, Severely decreased global left ventricular systolic function, catheter seen in the right atrium with a highly mobile echogenicity visualized on aortic valve, thrombus, an mass or thrombus seen in the inferior vena cava measuring 3.9 x 1.5 x 1.4 cm. No intervention was planned per CT surgery. As per CTS the patient requires further Heme/Onc evaluation as she underwent a CT A/P at , which revealed an abnormal breast density and extensive lymphadenopathy. Additionally the patient will need eventual ischemic workup for decreased LVEF. (19 Feb 2022 19:23)    Cardiology consulted. TTE completed on 2/18 revealing a very mobile, moderate sized, multilobulated echogenic structure attached to the right atrial septum consistent with endocarditis.  Left ventricular ejection fraction 45%.Mild mitral valve regurgitation. Trace aortic insufficiency. patient then transferred to [facility], seen by Dr. Raya. No Surgical interventions offered.   I spoke with Dr. Raya who discussed case with Dr. De Paz ([facility]) at [facility].         PAST MEDICAL & SURGICAL HISTORY:  Rheumatoid arthritis  H/O Raynaud&#x27;s syndrome  Heroin abuse  Smoker  Sepsis  HTN (hypertension)  COPD (chronic obstructive pulmonary disease)  Depression  Epilepsy  GERD (gastroesophageal reflux disease)  Bacteremia  Systemic lupus erythematosus  Aphonia  H/O tubal ligation  H/O appendicitis  Gall bladder stones  H/O tracheostomy  S/P percutaneous endoscopic gastrostomy (PEG) tube placement    Allergies:  morphine (Rash)  Intolerances: none    SOCIAL HISTORY:  Former 1/2 PPD smoker ,  > 30 pack yrs  Dhe denies current ETOH/drug use  Reports her brother takes care of her.    FAMILY HISTORY:  Family history of cardiomyopathy (Mother)      Medications:  apixaban 2.5 milliGRAM(s) Oral two times a day  chlorhexidine 2% Cloths 1 Application(s) Topical daily  collagenase Ointment 1 Application(s) Topical daily  fluconAZOLE   Tablet 200 milliGRAM(s) Oral daily  furosemide   Injectable 40 milliGRAM(s) IV Push two times a day  gabapentin 100 milliGRAM(s) Oral three times a day  hydroxychloroquine 200 milliGRAM(s) Oral daily  levETIRAcetam 500 milliGRAM(s) Oral daily  levETIRAcetam 250 milliGRAM(s) Oral <User Schedule>  magnesium sulfate  IVPB 1 Gram(s) IV Intermittent once  metoprolol tartrate 50 milliGRAM(s) Oral two times a day  pantoprazole   Suspension 40 milliGRAM(s) Oral daily  sevelamer carbonate 800 milliGRAM(s) Oral three times a day with meals    MEDICATIONS  (PRN):  acetaminophen     Tablet .. 650 milliGRAM(s) Oral every 6 hours PRN Temp greater or equal to 38C (100.4F), Mild Pain (1 - 3)  ALBUTerol    90 MICROgram(s) HFA Inhaler 2 Puff(s) Inhalation every 6 hours PRN Bronchospasm  artificial tears (preservative free) Ophthalmic Solution 1 Drop(s) Both EYES two times a day PRN Dry Eyes  melatonin 3 milliGRAM(s) Oral at bedtime PRN Insomnia  ondansetron Injectable 4 milliGRAM(s) IV Push every 8 hours PRN Nausea and/or Vomiting  oxyCODONE    IR 5 milliGRAM(s) Oral every 4 hours PRN Moderate Pain (4 - 6)  senna 2 Tablet(s) Oral at bedtime PRN Constipation    Home Medications:  acetaminophen 325 mg oral tablet: 2 tab(s) orally every 6 hours, As needed, Temp greater or equal to 38.5C (101.3F), Mild Pain (1 - 3) (19 Feb 2022 21:35)  apixaban 2.5 mg oral tablet: 1 tab(s) orally 2 times a day (19 Feb 2022 21:35)  collagenase 250 units/g topical ointment: 1 application topically once a day (19 Feb 2022 21:35)  epoetin amee: 43416 unit(s) intravenous MWF (19 Feb 2022 21:35)  fluconazole 200 mg oral tablet: 1 tab(s) intravenous once a day (19 Feb 2022 21:35)  furosemide 10 mg/mL injectable solution: 40 milligram(s) intravenous 2 times a day (19 Feb 2022 21:35)  gabapentin 100 mg oral capsule: 1 cap(s) orally 3 times a day (19 Feb 2022 21:35)  hydroxychloroquine 200 mg oral tablet: 1 tab(s) orally once a day (19 Feb 2022 21:35)  levETIRAcetam 250 mg oral tablet: 1 tab(s) orally 3 times a week, MWF at 1600 (19 Feb 2022 21:35)  levETIRAcetam 500 mg oral tablet: 1 tab(s) orally once a day (19 Feb 2022 21:35)  melatonin 3 mg oral tablet: 1 tab(s) orally once a day (at bedtime), As needed, Insomnia (19 Feb 2022 21:35)  metoprolol tartrate 50 mg oral tablet: 1 tab(s) orally 2 times a day (19 Feb 2022 21:35)  ocular lubricant ophthalmic solution: 1 drop(s) to each affected eye 2 times a day (19 Feb 2022 21:35)  oxyCODONE 5 mg oral tablet: 1 tab(s) orally every 4 hours, As needed, Moderate Pain (4 - 6) (19 Feb 2022 21:35)  pantoprazole 40 mg oral delayed release tablet: 1 tab(s) orally once a day (before a meal) (19 Feb 2022 21:35)  senna oral tablet: 2 tab(s) orally once a day (at bedtime), As needed, Constipation (19 Feb 2022 21:35)  sevelamer carbonate 800 mg oral tablet: 1 tab(s) orally 3 times a day (with meals) (19 Feb 2022 21:35)  vancomycin 500 mg intravenous injection: 500 milligram(s) intravenous 3 times a week post dialysis (19 Feb 2022 21:35)    REVIEW OF SYSTEMS:  CONSTITUTIONAL: No weakness, fevers or chills  EYES/ENT: No visual changes;  No vertigo or throat pain   NECK: No pain or stiffness  RESPIRATORY: No cough, wheezing, hemoptysis; No shortness of breath  CARDIOVASCULAR: No chest pain or palpitations  GASTROINTESTINAL: No abdominal or epigastric pain. No nausea, vomiting, or hematemesis; No diarrhea or constipation. No melena or hematochezia.  GENITOURINARY: No dysuria, frequency or hematuria  NEUROLOGICAL: No numbness or weakness  SKIN: No itching, burning, rashes, or lesions   All other review of systems is negative unless indicated above      PHYSICAL EXAM:  T(C): 36.8 (20 Feb 2022 09:00), Max: 36.8 (20 Feb 2022 09:00)  T(F): 98.2 (20 Feb 2022 09:00), Max: 98.2 (20 Feb 2022 09:00)  HR: 93 (20 Feb 2022 09:00) (90 - 97)  BP: 157/105 (20 Feb 2022 09:00) (118/60 - 157/105)  BP(mean): 83 (19 Feb 2022 16:00) (83 - 110)  RR: 18 (20 Feb 2022 09:00) (14 - 19)  SpO2: 98% (20 Feb 2022 09:00) (97% - 100%)    Constitutional: frail looking  HEENT: NC/AT, EOMI, PERRLA, conjunctivae clear; ears and nose atraumatic; pharynx clear; poor dentition  Neck: supple; thyroid not palpable; right neck shiley   Back: no tenderness  Respiratory: respiratory effort normal; clear to auscultation  Cardiovascular: S1S2 regular, 2/6 systolic murmur  Abdomen: soft, not tender, not distended, positive BS; no liver or spleen organomegaly  Genitourinary: no suprapubic tenderness  Musculoskeletal: no muscle tenderness, no joint swelling or tenderness  Neurological/ Psychiatric:   moving all extremities  Skin: no rashes; no palpable lesions; extreme scarring of extremities    Labs:                        8.0    7.90  )-----------( 225      ( 19 Feb 2022 04:23 )             23.8                         8.8    7.19  )-----------( 249      ( 18 Feb 2022 08:11 )             26.9     19 Feb 2022 04:23    128    |  93     |  19.2   ----------------------------<  93     3.8     |  21.0   |  4.00   18 Feb 2022 08:11    131    |  99     |  12     ----------------------------<  107    3.5     |  24     |  3.17     Ca    7.9        19 Feb 2022 04:23  Ca    7.9        18 Feb 2022 08:11  Mg     1.7       19 Feb 2022 04:23    TPro  5.6    /  Alb  2.6    /  TBili  0.3    /  DBili  x      /  AST  7      /  ALT  <5     /  AlkPhos  94     19 Feb 2022 04:23    PT/INR - ( 19 Feb 2022 04:23 )   PT: 14.7 sec;   INR: 1.28 ratio      PTT - ( 19 Feb 2022 04:23 )  PTT:31.1 sec    Cardiac Testing:    BNP 2/19 >05404    Tele Sinus tachycardia,     EKG 2/12: Sinus tachycardia    GRAHAM: 2/18:  There is a very mobile, moderate sized, multilobulated echogenic structure attached to the right atrial septum consistent with endocarditis.  Left ventricular ejection fraction 45%. Mild mitral valve regurgitation.  Trace aortic insufficiency.    TTE 2/8 : The left ventricle is normal in size. Moderately reduced left ventricular systolic function. Estimated left ventricular ejection fraction is 40%. Normal appearing right atrium. A catheter wire is seen in the right atrium. Normal aortic valve structure and function. Trivial aortic regurgitation is present. Normal appearing mitral valve structure and function. Mild (1+) mitral regurgitation is present.    Additional Radiology:    CT chest 2/12: Near complete resolution of previously identified mucoid debris in bronchus intermedius and bilateral lower lobe bronchi with tree-in-bud nodularity, right greater than left. No consolidation or pleural effusion. Asymmetric right breast density with nonspecific soft tissue infiltration extending to right chest wall.    CT abd/pelvis 2/12: Rectal tube identified, tip at the level of the rectosigmoid junction containing contrast. Mild wall thickening of the rectosigmoid junction. No bowel obstruction. Reidentified bulky retroperitoneal lymphadenopathy with cluster of multiple enlarged left paraaortic and pelvic lymph nodes, stable compared to recent prior study.

## 2022-02-20 NOTE — DIETITIAN INITIAL EVALUATION ADULT. - MALNUTRITION
Pt meets criteria for severe protein-calorie malnutrition in context of chronic disease r/t decreased ability to meet increased nutrient needs 2/2 multiple co-morbidities (ESRD on HD/ endocarditis/ COPD/ drug abuse/ RA) AEB severe muscle/ fat wasting, 5% wt loss x 2 weeks, BMI <19, unstageable PU

## 2022-02-20 NOTE — CONSULT NOTE ADULT - ASSESSMENT
42 yo female with complicated medical hx and hospital course ( see above)    IVDA hx --> ANCA vascualits --> ESRD on HD, w MRSA bacteremia /and fungemia ( candida) w persistent fevers depsite removal of permacath and exchange of 2 shiley catheters. + atrial septal vegetation and possible aortic vegetation. Transferred to [facility] and deemed not candidate for surgery. Transferred back next day for medical /nonsurgical care.    ESRD on HD    still with + bcx on 2/17 MRSA and in light of fungemia /vegetations  will arrange for HD Monday then remove shiley to allow for extended line holiday w removal of catheter if clincially stable.  5- 7 days line holiday if stable if not then may need one day shiley in between for HD     ID : Endocarditis    IV abx and anitfungals    cardiology fup     Atrial thromus/IVC thrombus    A/C as per cardiology      Anemia   TAMARA SC while HD on hold     PRBC as needed      Breast Mass/Lyphadenopathy     onc evaluation / ? bx    Palliaitive care eval - poor prognosis overall        d/w Halina Harrington/Riccardo and Palla this AM at length       Thank you for the courtesy of this consult. We will follow this patient with you.   Management is subject to change if new information becomes available or patient condition changes.

## 2022-02-20 NOTE — CONSULT NOTE ADULT - SUBJECTIVE AND OBJECTIVE BOX
Patient is a 41y old  Female who presents with a chief complaint of Bacteremia (2022 19:23)    HPI:  41 year old female former smoker (PPD, quit 2021) with a medical history of Lupus, RA, ANCA vasculitis with ESRD on HD (M/W/F), GERD, seizures, epilepsy, depression, COPD, HTN, substance abuse (marijuana, cocaine), IVDA (heroin), C. diff colitis 2021, bacteremia, fungemia, resp failure s/p Trach/PEG 20 (since reversed), stage 4 sacral decub since , multiple episodes of seizures & syncope with HD, s/p hospitalization 12/15-21 for seizures (CSF negative), hospitalized at [facility] 22-22 after found unresponsive at home after being left alone for an hour, diagnosed with Aspiration PNA requiring intubation, LLL infiltrate s/p 7 day course of Vanco/Zosyn, discharged 22 and readmitted to [facility] 22 after LOC during HD. Hospital course complicated by MRSA bacteremia  (treated with IV Vanco), dialysis catheter tip thrombus noted with permacath removed and RIJ Shiley placed , C.abicans and C. tropicalis fungemia  (IV Caspofungin X 4 days transitioned to IV Diflucan ). 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 Transferred to [facility] for CT surgery evaluation.  TTE at [facility] revealed LVEF 20-25%, Severely decreased global left ventricular systolic function, catheter seen in the right atrium with a highly mobile echogenicity visualized on aortic valve, thrombus, an mass or thrombus seen in the inferior vena cava measuring 3.9 x 1.5 x 1.4 cm. No intervention was planned per CT surgery. As per CTS the patient requires further Heme/Onc evaluation as she underwent a CT A/P at , which revealed an abnormal breast density and extensive lymphadenopathy. Additionally the patient will need eventual ischemic workup for decreased LVEF; therefore she was readmitted back to  on . Blood cultures from  are still growing yeast, and Coag negative staph. The patient is frustrated with medical problems, has no idea what will happen to her.     PMH: as above  PSH: as above  Meds: per reconciliation sheet, noted below  MEDICATIONS  (STANDING):  apixaban 2.5 milliGRAM(s) Oral two times a day  chlorhexidine 2% Cloths 1 Application(s) Topical daily  collagenase Ointment 1 Application(s) Topical daily  fluconAZOLE   Tablet 200 milliGRAM(s) Oral daily  furosemide   Injectable 40 milliGRAM(s) IV Push two times a day  gabapentin 100 milliGRAM(s) Oral three times a day  hydroxychloroquine 200 milliGRAM(s) Oral daily  levETIRAcetam 500 milliGRAM(s) Oral daily  levETIRAcetam 250 milliGRAM(s) Oral <User Schedule>  magnesium sulfate  IVPB 1 Gram(s) IV Intermittent once  metoprolol tartrate 50 milliGRAM(s) Oral two times a day  pantoprazole   Suspension 40 milliGRAM(s) Oral daily  sevelamer carbonate 800 milliGRAM(s) Oral three times a day with meals    MEDICATIONS  (PRN):  acetaminophen     Tablet .. 650 milliGRAM(s) Oral every 6 hours PRN Temp greater or equal to 38C (100.4F), Mild Pain (1 - 3)  ALBUTerol    90 MICROgram(s) HFA Inhaler 2 Puff(s) Inhalation every 6 hours PRN Bronchospasm  artificial tears (preservative free) Ophthalmic Solution 1 Drop(s) Both EYES two times a day PRN Dry Eyes  melatonin 3 milliGRAM(s) Oral at bedtime PRN Insomnia  ondansetron Injectable 4 milliGRAM(s) IV Push every 8 hours PRN Nausea and/or Vomiting  oxyCODONE    IR 5 milliGRAM(s) Oral every 4 hours PRN Moderate Pain (4 - 6)  senna 2 Tablet(s) Oral at bedtime PRN Constipation    Allergies    morphine (Rash)    Intolerances      Social: former smoker, cocaine, heroin user  FAMILY HISTORY:  Family history of cardiomyopathy (Mother)       no history of premature cardiovascular disease in first degree relatives  ROS: the patient denies fever, no chills, no HA, no dizziness, no sore throat, no blurry vision, no CP, no palpitations, no SOB, no cough, no abdominal pain, no diarrhea, no N/V, no dysuria, no leg pain, no claudication, no rash, no joint aches, no rectal pain or bleeding, no night sweats  All other systems reviewed and are negative    Vital Signs Last 24 Hrs  T(C): 36.8 (2022 09:00), Max: 36.8 (2022 09:00)  T(F): 98.2 (2022 09:00), Max: 98.2 (2022 09:00)  HR: 93 (2022 09:00) (87 - 97)  BP: 157/105 (2022 09:00) (118/60 - 157/105)  BP(mean): 83 (2022 16:00) (83 - 115)  RR: 18 (2022 09:00) (14 - 19)  SpO2: 98% (2022 09:00) (97% - 100%)  Daily     Daily     PE:    Constitutional: frail looking  HEENT: NC/AT, EOMI, PERRLA, conjunctivae clear; ears and nose atraumatic; pharynx clear; poor dentition  Neck: supple; thyroid not palpable; right neck shiley  Back: no tenderness  Respiratory: respiratory effort normal; clear to auscultation  Cardiovascular: S1S2 regular, 2/6 systolic murmur  Abdomen: soft, not tender, not distended, positive BS; no liver or spleen organomegaly  Genitourinary: no suprapubic tenderness  Musculoskeletal: no muscle tenderness, no joint swelling or tenderness  Neurological/ Psychiatric:   moving all extremities  Skin: no rashes; no palpable lesions; extreme scarring of extremities    Labs: all available labs reviewed                        8.0    7.90  )-----------( 225      ( 2022 04:23 )             23.8     02-19    128<L>  |  93<L>  |  19.2  ----------------------------<  93  3.8   |  21.0<L>  |  4.00<H>    Ca    7.9<L>      2022 04:23  Mg     1.7     -19    TPro  5.6<L>  /  Alb  2.6<L>  /  TBili  0.3<L>  /  DBili  x   /  AST  7   /  ALT  <5  /  AlkPhos  94       LIVER FUNCTIONS - ( 2022 04:23 )  Alb: 2.6 g/dL / Pro: 5.6 g/dL / ALK PHOS: 94 U/L / ALT: <5 U/L / AST: 7 U/L / GGT: x           Urinalysis Basic - ( 2022 23:01 )    Color: Yellow / Appearance: Slightly Turbid / S.010 / pH: x  Gluc: x / Ketone: Negative  / Bili: Negative / Urobili: Negative mg/dL   Blood: x / Protein: 30 mg/dL / Nitrite: Negative   Leuk Esterase: Trace / RBC: 0-2 /HPF / WBC 0-2 /HPF   Sq Epi: x / Non Sq Epi: Many / Bacteria: x    Culture - Blood (22 @ 11:37)    Gram Stain:   Growth in aerobic and anaerobic bottles: Gram Positive Cocci in Clusters    -  Staphylococcus epidermidis, Methicillin resistant: Detec    Specimen Source: .Blood None    Organism: Blood Culture PCR    Culture Results:   Growth in aerobic bottle: Staphylococcus epidermidis  Coag Negative Staphylococcus  Single set isolate, possible contaminant. Contact  Microbiology if susceptibility testing clinically  indicated.  Growth in anaerobic bottle: Gram Positive Cocci inClusters  ***Blood Panel PCR results on this specimen are available  approximately 3 hours after the Gram stain result.***  Gram stain, PCR, and/or culture results may not always  correspond due to difference in methodologies.  ************************************************************  This PCR assay was performed by multiplex PCR. This  Assay tests for 66 bacterial and resistance gene targets.  Please refer to the [facility] test directory  at [email or web address] for details.    Organism Identification: Blood Culture PCR    Method Type: PCR        Culture - Blood (22 @ 11:37)    Gram Stain:   Growth in aerobic bottle: Yeast like cells    Specimen Source: .Blood None    Culture Results:   Growth in aerobic bottle: Yeast like cells                                      Radiology: all available radiological tests reviewed    Advanced directives addressed: full resuscitation

## 2022-02-20 NOTE — DIETITIAN INITIAL EVALUATION ADULT. - PERTINENT LABORATORY DATA
02-19    128<L>  |  93<L>  |  19.2  ----------------------------<  93  3.8   |  21.0<L>  |  4.00<H>    Ca    7.9<L>      19 Feb 2022 04:23  Mg     1.7     02-19    TPro  5.6<L>  /  Alb  2.6<L>  /  TBili  0.3<L>  /  DBili  x   /  AST  7   /  ALT  <5  /  AlkPhos  94  02-19

## 2022-02-21 LAB
ALBUMIN SERPL ELPH-MCNC: 1.7 G/DL — LOW (ref 3.3–5)
ALP SERPL-CCNC: 80 U/L — SIGNIFICANT CHANGE UP (ref 40–120)
ALT FLD-CCNC: <6 U/L — LOW (ref 12–78)
ANION GAP SERPL CALC-SCNC: 8 MMOL/L — SIGNIFICANT CHANGE UP (ref 5–17)
AST SERPL-CCNC: 7 U/L — LOW (ref 15–37)
BASOPHILS # BLD AUTO: 0.09 K/UL — SIGNIFICANT CHANGE UP (ref 0–0.2)
BASOPHILS NFR BLD AUTO: 1 % — SIGNIFICANT CHANGE UP (ref 0–2)
BILIRUB SERPL-MCNC: 0.3 MG/DL — SIGNIFICANT CHANGE UP (ref 0.2–1.2)
BUN SERPL-MCNC: 14 MG/DL — SIGNIFICANT CHANGE UP (ref 7–23)
CALCIUM SERPL-MCNC: 7.9 MG/DL — LOW (ref 8.5–10.1)
CHLORIDE SERPL-SCNC: 99 MMOL/L — SIGNIFICANT CHANGE UP (ref 96–108)
CO2 SERPL-SCNC: 27 MMOL/L — SIGNIFICANT CHANGE UP (ref 22–31)
CREAT SERPL-MCNC: 4.05 MG/DL — HIGH (ref 0.5–1.3)
EOSINOPHIL # BLD AUTO: 0.13 K/UL — SIGNIFICANT CHANGE UP (ref 0–0.5)
EOSINOPHIL NFR BLD AUTO: 1.5 % — SIGNIFICANT CHANGE UP (ref 0–6)
GLUCOSE SERPL-MCNC: 84 MG/DL — SIGNIFICANT CHANGE UP (ref 70–99)
HAV IGM SER-ACNC: SIGNIFICANT CHANGE UP
HBV CORE IGM SER-ACNC: SIGNIFICANT CHANGE UP
HBV SURFACE AG SER-ACNC: SIGNIFICANT CHANGE UP
HCT VFR BLD CALC: 27.7 % — LOW (ref 34.5–45)
HCV AB S/CO SERPL IA: 0.3 S/CO — SIGNIFICANT CHANGE UP (ref 0–0.99)
HCV AB SERPL-IMP: SIGNIFICANT CHANGE UP
HGB BLD-MCNC: 8.6 G/DL — LOW (ref 11.5–15.5)
IMM GRANULOCYTES NFR BLD AUTO: 1 % — SIGNIFICANT CHANGE UP (ref 0–1.5)
LYMPHOCYTES # BLD AUTO: 1.07 K/UL — SIGNIFICANT CHANGE UP (ref 1–3.3)
LYMPHOCYTES # BLD AUTO: 12 % — LOW (ref 13–44)
MAGNESIUM SERPL-MCNC: 2.2 MG/DL — SIGNIFICANT CHANGE UP (ref 1.6–2.6)
MCHC RBC-ENTMCNC: 28 PG — SIGNIFICANT CHANGE UP (ref 27–34)
MCHC RBC-ENTMCNC: 31 GM/DL — LOW (ref 32–36)
MCV RBC AUTO: 90.2 FL — SIGNIFICANT CHANGE UP (ref 80–100)
MONOCYTES # BLD AUTO: 0.82 K/UL — SIGNIFICANT CHANGE UP (ref 0–0.9)
MONOCYTES NFR BLD AUTO: 9.2 % — SIGNIFICANT CHANGE UP (ref 2–14)
NEUTROPHILS # BLD AUTO: 6.7 K/UL — SIGNIFICANT CHANGE UP (ref 1.8–7.4)
NEUTROPHILS NFR BLD AUTO: 75.3 % — SIGNIFICANT CHANGE UP (ref 43–77)
PHOSPHATE SERPL-MCNC: 3.4 MG/DL — SIGNIFICANT CHANGE UP (ref 2.5–4.5)
PLATELET # BLD AUTO: 405 K/UL — HIGH (ref 150–400)
POTASSIUM SERPL-MCNC: 3.7 MMOL/L — SIGNIFICANT CHANGE UP (ref 3.5–5.3)
POTASSIUM SERPL-SCNC: 3.7 MMOL/L — SIGNIFICANT CHANGE UP (ref 3.5–5.3)
PROT SERPL-MCNC: 6 GM/DL — SIGNIFICANT CHANGE UP (ref 6–8.3)
RBC # BLD: 3.07 M/UL — LOW (ref 3.8–5.2)
RBC # FLD: 17.2 % — HIGH (ref 10.3–14.5)
SODIUM SERPL-SCNC: 134 MMOL/L — LOW (ref 135–145)
WBC # BLD: 8.9 K/UL — SIGNIFICANT CHANGE UP (ref 3.8–10.5)
WBC # FLD AUTO: 8.9 K/UL — SIGNIFICANT CHANGE UP (ref 3.8–10.5)

## 2022-02-21 PROCEDURE — 99233 SBSQ HOSP IP/OBS HIGH 50: CPT

## 2022-02-21 PROCEDURE — 99232 SBSQ HOSP IP/OBS MODERATE 35: CPT

## 2022-02-21 RX ADMIN — Medication 3 MILLIGRAM(S): at 22:13

## 2022-02-21 RX ADMIN — LEVETIRACETAM 500 MILLIGRAM(S): 250 TABLET, FILM COATED ORAL at 15:35

## 2022-02-21 RX ADMIN — ERYTHROPOIETIN 10000 UNIT(S): 10000 INJECTION, SOLUTION INTRAVENOUS; SUBCUTANEOUS at 13:46

## 2022-02-21 RX ADMIN — PANTOPRAZOLE SODIUM 40 MILLIGRAM(S): 20 TABLET, DELAYED RELEASE ORAL at 15:25

## 2022-02-21 RX ADMIN — Medication 50 MILLIGRAM(S): at 15:26

## 2022-02-21 RX ADMIN — SEVELAMER CARBONATE 800 MILLIGRAM(S): 2400 POWDER, FOR SUSPENSION ORAL at 15:25

## 2022-02-21 RX ADMIN — LEVETIRACETAM 250 MILLIGRAM(S): 250 TABLET, FILM COATED ORAL at 15:26

## 2022-02-21 RX ADMIN — OXYCODONE HYDROCHLORIDE 5 MILLIGRAM(S): 5 TABLET ORAL at 22:16

## 2022-02-21 RX ADMIN — Medication 40 MILLIGRAM(S): at 23:52

## 2022-02-21 RX ADMIN — OXYCODONE HYDROCHLORIDE 5 MILLIGRAM(S): 5 TABLET ORAL at 08:20

## 2022-02-21 RX ADMIN — Medication 250 MILLIGRAM(S): at 13:41

## 2022-02-21 RX ADMIN — Medication 1 APPLICATION(S): at 15:34

## 2022-02-21 RX ADMIN — Medication 40 MILLIGRAM(S): at 15:24

## 2022-02-21 RX ADMIN — APIXABAN 2.5 MILLIGRAM(S): 2.5 TABLET, FILM COATED ORAL at 15:26

## 2022-02-21 RX ADMIN — APIXABAN 2.5 MILLIGRAM(S): 2.5 TABLET, FILM COATED ORAL at 22:13

## 2022-02-21 RX ADMIN — OXYCODONE HYDROCHLORIDE 5 MILLIGRAM(S): 5 TABLET ORAL at 13:47

## 2022-02-21 RX ADMIN — OXYCODONE HYDROCHLORIDE 5 MILLIGRAM(S): 5 TABLET ORAL at 17:26

## 2022-02-21 RX ADMIN — OXYCODONE HYDROCHLORIDE 5 MILLIGRAM(S): 5 TABLET ORAL at 04:00

## 2022-02-21 RX ADMIN — SEVELAMER CARBONATE 800 MILLIGRAM(S): 2400 POWDER, FOR SUSPENSION ORAL at 22:13

## 2022-02-21 RX ADMIN — OXYCODONE HYDROCHLORIDE 5 MILLIGRAM(S): 5 TABLET ORAL at 13:02

## 2022-02-21 RX ADMIN — OXYCODONE HYDROCHLORIDE 5 MILLIGRAM(S): 5 TABLET ORAL at 22:13

## 2022-02-21 RX ADMIN — Medication 50 MILLIGRAM(S): at 22:13

## 2022-02-21 RX ADMIN — Medication 200 MILLIGRAM(S): at 15:27

## 2022-02-21 RX ADMIN — CHLORHEXIDINE GLUCONATE 1 APPLICATION(S): 213 SOLUTION TOPICAL at 15:34

## 2022-02-21 RX ADMIN — FLUCONAZOLE 200 MILLIGRAM(S): 150 TABLET ORAL at 15:26

## 2022-02-21 RX ADMIN — OXYCODONE HYDROCHLORIDE 5 MILLIGRAM(S): 5 TABLET ORAL at 02:51

## 2022-02-21 RX ADMIN — ONDANSETRON 4 MILLIGRAM(S): 8 TABLET, FILM COATED ORAL at 15:41

## 2022-02-22 DIAGNOSIS — A41.02 SEPSIS DUE TO METHICILLIN RESISTANT STAPHYLOCOCCUS AUREUS: ICD-10-CM

## 2022-02-22 PROCEDURE — 99223 1ST HOSP IP/OBS HIGH 75: CPT

## 2022-02-22 PROCEDURE — 99233 SBSQ HOSP IP/OBS HIGH 50: CPT

## 2022-02-22 RX ORDER — ACETAMINOPHEN 500 MG
675 TABLET ORAL ONCE
Refills: 0 | Status: COMPLETED | OUTPATIENT
Start: 2022-02-22 | End: 2022-02-22

## 2022-02-22 RX ADMIN — SEVELAMER CARBONATE 800 MILLIGRAM(S): 2400 POWDER, FOR SUSPENSION ORAL at 09:41

## 2022-02-22 RX ADMIN — APIXABAN 2.5 MILLIGRAM(S): 2.5 TABLET, FILM COATED ORAL at 21:16

## 2022-02-22 RX ADMIN — PANTOPRAZOLE SODIUM 40 MILLIGRAM(S): 20 TABLET, DELAYED RELEASE ORAL at 09:40

## 2022-02-22 RX ADMIN — FLUCONAZOLE 200 MILLIGRAM(S): 150 TABLET ORAL at 09:41

## 2022-02-22 RX ADMIN — APIXABAN 2.5 MILLIGRAM(S): 2.5 TABLET, FILM COATED ORAL at 09:41

## 2022-02-22 RX ADMIN — LEVETIRACETAM 500 MILLIGRAM(S): 250 TABLET, FILM COATED ORAL at 09:41

## 2022-02-22 RX ADMIN — OXYCODONE HYDROCHLORIDE 5 MILLIGRAM(S): 5 TABLET ORAL at 23:27

## 2022-02-22 RX ADMIN — Medication 50 MILLIGRAM(S): at 09:42

## 2022-02-22 RX ADMIN — OXYCODONE HYDROCHLORIDE 5 MILLIGRAM(S): 5 TABLET ORAL at 09:41

## 2022-02-22 RX ADMIN — Medication 40 MILLIGRAM(S): at 09:40

## 2022-02-22 RX ADMIN — Medication 3 MILLIGRAM(S): at 23:39

## 2022-02-22 RX ADMIN — Medication 50 MILLIGRAM(S): at 21:16

## 2022-02-22 RX ADMIN — GABAPENTIN 100 MILLIGRAM(S): 400 CAPSULE ORAL at 21:16

## 2022-02-22 RX ADMIN — Medication 650 MILLIGRAM(S): at 21:16

## 2022-02-22 RX ADMIN — OXYCODONE HYDROCHLORIDE 5 MILLIGRAM(S): 5 TABLET ORAL at 02:44

## 2022-02-22 RX ADMIN — Medication 650 MILLIGRAM(S): at 21:19

## 2022-02-22 RX ADMIN — OXYCODONE HYDROCHLORIDE 5 MILLIGRAM(S): 5 TABLET ORAL at 18:46

## 2022-02-22 RX ADMIN — Medication 40 MILLIGRAM(S): at 17:45

## 2022-02-22 RX ADMIN — SEVELAMER CARBONATE 800 MILLIGRAM(S): 2400 POWDER, FOR SUSPENSION ORAL at 17:45

## 2022-02-22 RX ADMIN — Medication 1 APPLICATION(S): at 13:02

## 2022-02-22 RX ADMIN — Medication 270 MILLIGRAM(S): at 04:04

## 2022-02-22 RX ADMIN — Medication 200 MILLIGRAM(S): at 09:41

## 2022-02-22 RX ADMIN — OXYCODONE HYDROCHLORIDE 5 MILLIGRAM(S): 5 TABLET ORAL at 14:43

## 2022-02-22 NOTE — CHART NOTE - NSCHARTNOTEFT_GEN_A_CORE
HPI:  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 (M/W/F), GERD, seizures, epilepsy, depression, COPD, HTN, substance abuse (marijuana, cocaine), IVDA (heroin), C. diff colitis 12/2021, bacteremia, fungemia, resp failure s/p Trach/PEG 7/13/20 (since reversed), stage 4 sacral decub since 2021, multiple episodes of seizures & syncope with HD, s/p hospitalization 12/15-12/18/21 for seizures (CSF negative), hospitalized at [facility] 1/18/22-2/2/22 after found unresponsive at home after being left alone for an hour, diagnosed with Aspiration PNA requiring intubation, LLL infiltrate s/p 7 day course of Vanco/Zosyn, discharged 2/2/22 and readmitted to [facility] 2/4/22 after LOC during HD. Hospital course complicated by MRSA bacteremia 2/4 (treated with IV Vanco), dialysis catheter tip thrombus noted with permacath removed and RIJ Shiley placed 2/11, C.abicans and C. tropicalis fungemia 2/11 (IV Caspofungin X 4 days transitioned to IV Diflucan 2/16). 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 Transferred to [facility] for CT surgery evaluation. No intervention was planned per CT surgery. As per CTS the patient requires further Heme/Onc evaluation as she underwent a CT A/P at , which revealed an abnormal breast density and extensive lymphadenopathy. Additionally the patient will need eventual ischemic workup for decreased LVEF. (19 Feb 2022 19:23)    PERTINENT PMH REVIEWED:  [x ] YES [ ] NO           Primary Contact: aunt Gregoria Hernandez ([phone])    HCP [x] Surrogate [   ] Guardian [   ]    Mental Status: Alert  [ x] Oriented [x ] Confused [  ] Lethargic [  ]  Concerns of Depression [  ] denies  Anxiety [   ] denies  Baseline ADLs (prior to admission):  Independent [ ] moderately [ ] fully   Dependent   [ x] moderately [ ]fully    Family Meeting attendees: Pt has capacity to make own decisions.     Anticipated Grief: Patient[ x ] Family [  ]    Caregiver [place] Assessed: Yes [ x ] No [  ]    Synagogue: None identified.     Spiritual Concerns: None identified.     Goals of Care: To be discussed during GOC    Previous Services: [facility] M[facility], [facility] in past    ADVANCE DIRECTIVES:   HCP naming her aunt Gregoria as primary health care agent. Located on [employer]      Anticipated D/C Plan: GOC discussion ongoing.                      Summary: HPI:  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 (M/W/F), GERD, seizures, epilepsy, depression, COPD, HTN, substance abuse (marijuana, cocaine), IVDA (heroin), C. diff colitis 12/2021, bacteremia, fungemia, resp failure s/p Trach/PEG 7/13/20 (since reversed), stage 4 sacral decub since 2021, multiple episodes of seizures & syncope with HD, s/p hospitalization 12/15-12/18/21 for seizures (CSF negative), hospitalized at [facility] 1/18/22-2/2/22 after found unresponsive at home after being left alone for an hour, diagnosed with Aspiration PNA requiring intubation, LLL infiltrate s/p 7 day course of Vanco/Zosyn, discharged 2/2/22 and readmitted to [facility] 2/4/22 after LOC during HD. Hospital course complicated by MRSA bacteremia 2/4 (treated with IV Vanco), dialysis catheter tip thrombus noted with permacath removed and RIJ Shiley placed 2/11, C.abicans and C. tropicalis fungemia 2/11 (IV Caspofungin X 4 days transitioned to IV Diflucan 2/16). 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 Transferred to [facility] for CT surgery evaluation. No intervention was planned per CT surgery. As per CTS the patient requires further Heme/Onc evaluation as she underwent a CT A/P at , which revealed an abnormal breast density and extensive lymphadenopathy. Additionally the patient will need eventual ischemic workup for decreased LVEF. (19 Feb 2022 19:23)    PERTINENT PMH REVIEWED:  [x ] YES [ ] NO           Primary Contact: aunt Gregoria Hernandez ([phone])    HCP [x] Surrogate [   ] Guardian [   ]    Mental Status: Alert  [ x] Oriented [x ] Confused [  ] Lethargic [  ]  Concerns of Depression [ x ] Feels "sad" due to prognosis.   Anxiety [   ] denies  Baseline ADLs (prior to admission):  Independent [ ] moderately [ ] fully   Dependent   [ x] moderately [ ]fully    Family Meeting attendees: Pt has capacity to make own decisions.     Anticipated Grief: Patient[ x ] Family [  ]    Caregiver [place] Assessed: Yes [ x ] No [  ]    Holiness: None identified.     Spiritual Concerns: None identified.     Goals of Care: To be discussed during GOC    Previous Services: [facility] MWF, [facility] in past    ADVANCE DIRECTIVES:   HCP naming her aunt Gregoria as primary health care agent. [facility]      Anticipated D/C Plan: GOC discussion ongoing.                      Summary: SW met with patient at bedside to re-introduce self & offer support. Palliative SW role explained. This patient is known to team from last admission. Patient lying down in bed, appears alert, oriented & able to make needs known. Pt starts off discussion by sharing that she is feeling "sad." She shares that she feels this way due to what the MDs have told her about her illness & prognosis. She states that she wants to "die at home with family, friends & her cats." Emotional support provided & feelings validated.    Pt states that she understands that the hemodialysis treatments "aren't doing much" but she is still choosing to continue HD because she "wants as much time" as she can have. Pt agreeable to hear about different options. SW reviewed Hospice philosophy. Pt appears receptive to idea of hospice but not ready to make the decision to stop HD treatments at this point.     Pt was also agreeable to review code status. SW explained MOLST form. Pt shares that she needs time to think about making herself DNR/DNI. Support provided & SW explained that she did not have to make a decision at this moment. She understands that she is a Full Code status at this time.     Pt was tearful at times during visit but appears receptive & appreciative of visit. Emotional support provided. Our team will continue to follow.

## 2022-02-22 NOTE — CONSULT NOTE ADULT - ASSESSMENT
HPI: Pt is a 41y old Female former smoker (1/4 PPD, quit 12/2021) with a medical history of Lupus, RA, ANCA vasculitis with ESRD on HD (M/W/F), GERD, seizures, epilepsy, depression, COPD, HTN, substance abuse (marijuana, cocaine), IVDA (heroin), C. diff colitis 12/2021, bacteremia, fungemia, resp failure s/p Trach/PEG 7/13/20 (since reversed), stage 4 sacral decub since 2021, multiple episodes of seizures & syncope with HD, s/p hospitalization 12/15-12/18/21 for seizures (CSF negative), hospitalized at [facility] 1/18/22-2/2/22 after found unresponsive at home, diagnosed with Aspiration PNA requiring intubation, LLL infiltrate s/p 7 day course of Vanco/Zosyn, discharged 2/2/22 and readmitted to [facility] 2/4/22 after LOC during HD. Hospital course complicated by MRSA bacteremia 2/4 (treated with IV Vanco), dialysis catheter tip thrombus noted with permacath removed and RIJ Shiley placed 2/11, C.abicans and C. tropicalis fungemia 2/11 (IV Caspofungin X 4 days transitioned to IV Diflucan 2/16). 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 Transferred to [facility] for CT surgery evaluation. TTE at [facility] revealed LVEF 20-25%, Severely decreased global left ventricular systolic function, catheter seen in the right atrium with a highly mobile echogenicity visualized on aortic valve, thrombus, an mass or thrombus seen in the inferior vena cava measuring 3.9 x 1.5 x 1.4 cm. No intervention was planned per CT surgery. As per CTS the patient requires further Heme/Onc evaluation as she underwent a CT A/P at , which revealed an abnormal breast density and extensive lymphadenopathy. Additionally the patient will need eventual ischemic workup for decreased LVEF. Palliative Medicine Consult to further establish GOC asa we had seen the pt last adm  2/22/22 Seen and examined at bedside this am. Awake and alert. States she has chronic pain R/T arthritis. Denies dyspnea at rest.    Assessment and Plan:    1) Sepsis  -MRSA bacteremia  -Now candida in Bld C&S as well  -ID eval noted  -Cont abx/antifungal  as per ID  -HD accsess complicated   -Plan to remove and replace when bacteremia cleared     2) ESRD on HD  -Cont as pt requests  -Perma cath when bacteremia cleared  -Nephrology eval noted    3) Debility  -ESRD  -Bacteremia  -Poor PO intake  -Nutrition consult  -PT consult  -Hypoalbuminemia  -Anemia  chronic  -Pressure injury coccyx    4) Abnormal breast density  -CT chest 2/12  -Patient will need non-emergent breast imaging correlation to exclude underlying malignancy including inflammatory carcinoma.  -Onc eval    5) Advanced Directives  -Pt with capacity  -HCP naming Aunt on chart  -Does not want to set any limits at this time  -Will follow           HPI: Pt is a 41y old Female former smoker (1/4 PPD, quit 12/2021) with a medical history of Lupus, RA, ANCA vasculitis with ESRD on HD (M/W/F), GERD, seizures, epilepsy, depression, COPD, HTN, substance abuse (marijuana, cocaine), IVDA (heroin), C. diff colitis 12/2021, bacteremia, fungemia, resp failure s/p Trach/PEG 7/13/20 (since reversed), stage 4 sacral decub since 2021, multiple episodes of seizures & syncope with HD, s/p hospitalization 12/15-12/18/21 for seizures (CSF negative), hospitalized at [facility] 1/18/22-2/2/22 after found unresponsive at home, diagnosed with Aspiration PNA requiring intubation, LLL infiltrate s/p 7 day course of Vanco/Zosyn, discharged 2/2/22 and readmitted to [facility] 2/4/22 after LOC during HD. Hospital course complicated by MRSA bacteremia 2/4 (treated with IV Vanco), dialysis catheter tip thrombus noted with permacath removed and RIJ Shiley placed 2/11, C.abicans and C. tropicalis fungemia 2/11 (IV Caspofungin X 4 days transitioned to IV Diflucan 2/16). 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 Transferred to [facility] for CT surgery evaluation. TTE at [facility] revealed LVEF 20-25%, Severely decreased global left ventricular systolic function, catheter seen in the right atrium with a highly mobile echogenicity visualized on aortic valve, thrombus, an mass or thrombus seen in the inferior vena cava measuring 3.9 x 1.5 x 1.4 cm. No intervention was planned per CT surgery. As per CTS the patient requires further Heme/Onc evaluation as she underwent a CT A/P at , which revealed an abnormal breast density and extensive lymphadenopathy. Additionally the patient will need eventual ischemic workup for decreased LVEF. Palliative Medicine Consult to further establish GOC asa we had seen the pt last adm  2/22/22 Seen and examined at bedside this am. Awake and alert. States she has chronic pain R/T arthritis. Denies dyspnea at rest.    Assessment and Plan:    1) Sepsis  -MRSA bacteremia  -Now candida in Bld C&S as well  -ID eval noted  -Cont abx/antifungal  as per ID  -HD accsess complicated   -Plan to remove and replace when bacteremia cleared     2) ESRD on HD  -Cont as pt requests  -Perma cath when bacteremia cleared  -Nephrology eval noted    3) Debility  -ESRD  -Bacteremia  -Poor PO intake  -Nutrition consult  -PT consult  -Hypoalbuminemia  -Anemia  chronic  -Pressure injury coccyx    4) Abnormal breast density  -CT chest 2/12  -Patient will need non-emergent breast imaging correlation to exclude underlying malignancy including inflammatory carcinoma.  -Onc eval    5) Advanced Directives  -Pt with capacity  -HCP naming Aunt on chart naming Gregoria Urbing   -Does not want to set any limits at this time  -Will follow

## 2022-02-22 NOTE — CONSULT NOTE ADULT - SUBJECTIVE AND OBJECTIVE BOX
HPI: Pt is a 41y old Female former smoker ( PPD, quit 2021) with a medical history of Lupus, RA, ANCA vasculitis with ESRD on HD (M/W/F), GERD, seizures, epilepsy, depression, COPD, HTN, substance abuse (marijuana, cocaine), IVDA (heroin), C. diff colitis 2021, bacteremia, fungemia, resp failure s/p Trach/PEG 20 (since reversed), stage 4 sacral decub since , multiple episodes of seizures & syncope with HD, s/p hospitalization 12/15-21 for seizures (CSF negative), hospitalized at [facility] 22-22 after found unresponsive at home, diagnosed with Aspiration PNA requiring intubation, LLL infiltrate s/p 7 day course of Vanco/Zosyn, discharged 22 and readmitted to [facility] 22 after LOC during HD. Hospital course complicated by MRSA bacteremia  (treated with IV Vanco), dialysis catheter tip thrombus noted with permacath removed and RIJ Shiley placed , C.abicans and C. tropicalis fungemia  (IV Caspofungin X 4 days transitioned to IV Diflucan ). 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 Transferred to [facility] for CT surgery evaluation. TTE at [facility] revealed LVEF 20-25%, Severely decreased global left ventricular systolic function, catheter seen in the right atrium with a highly mobile echogenicity visualized on aortic valve, thrombus, an mass or thrombus seen in the inferior vena cava measuring 3.9 x 1.5 x 1.4 cm. No intervention was planned per CT surgery. As per CTS the patient requires further Heme/Onc evaluation as she underwent a CT A/P at , which revealed an abnormal breast density and extensive lymphadenopathy. Additionally the patient will need eventual ischemic workup for decreased LVEF. Palliative Medicine Consult to further establish GOC asa we had seen the pt last adm  22 Seen and examined at bedside this am. Awake and alert. States she has chronic pain R/T arthritis. Denies dyspnea at rest.    PAIN: (X )Yes   ( )No  Level: mod-severe  Location: generalized  Intensity:   ? /10  Quality: ache  Aggravating Factors: unknown  Alleviating Factors: opiates  Impact on ADLs: severe    DYSPNEA: (X ) Yes  ( ) No  Level: increased on exertion    PAST MEDICAL & SURGICAL HISTORY:  Sepsis  HTN (hypertension)  COPD (chronic obstructive pulmonary disease)  Depression  Epilepsy  GERD (gastroesophageal reflux disease)  Bacteremia  Systemic lupus erythematosus  Aphonia  H/O tubal ligation  H/O appendicitis  Gall bladder stones  H/O tracheostomy  S/P percutaneous endoscopic gastrostomy (PEG) tube placement        SOCIAL HX:  Lives with brother  Hx opiate tolerance ( )YES  ( )NO    Baseline ADLs  (Prior to Admission)  ( ) Independent   ( )Dependent    FAMILY HISTORY:  Family history of cardiomyopathy (Mother)        Review of Systems:    Anxiety-yes H/O  Depression-yes H/O  Physical Discomfort-generalized  Dyspnea-on exertion  Anorexia-yes  Fatigue-severe  Weakness-severe    All other systems reviewed and negative    PHYSICAL EXAM:    Vital Signs Last 24 Hrs  T(C): 36.6 (2022 09:40), Max: 39.2 (2022 03:02)  T(F): 97.8 (2022 09:40), Max: 102.5 (2022 03:02)  HR: 77 (2022 09:40) (77 - 107)  BP: 142/94 (2022 09:40) (130/87 - 170/97)  BP(mean): 110 (2022 03:02) (110 - 110)  RR: 18 (2022 09:40) (16 - 18)  SpO2: 100% (2022 09:40) (100% - 100%)  Daily Weight in k.2 (2022 05:52)    PPSV2: 40 %    General: Frail middle aged female in bed in NAD  Mental Status: A&O X3  HEENT: oral mucosa dry  Lungs: clear diminished edel  Cardiac: S1S2+  GI: abd soft NT ND + BS  : abd soft NT ND + BS  Ext: BLAKELY on bed  Neuro: no focal def      LABS:                        8.6    8.90  )-----------( 405      ( 2022 11:29 )             27.7     02-21    134<L>  |  99  |  14  ----------------------------<  84  3.7   |  27  |  4.05<H>    Ca    7.9<L>      2022 11:29  Phos  3.4       Mg     2.2         TPro  6.0  /  Alb  1.7<L>  /  TBili  0.3  /  DBili  x   /  AST  7<L>  /  ALT  <6<L>  /  AlkPhos  80    Albumin: Albumin, Serum: 1.7 g/dL ( @ 11:29)    Allergies  morphine (Rash)  Intolerances    MEDICATIONS  (STANDING):  apixaban 2.5 milliGRAM(s) Oral two times a day  chlorhexidine 2% Cloths 1 Application(s) Topical daily  collagenase Ointment 1 Application(s) Topical daily  epoetin amee-epbx (RETACRIT) Injectable 78810 Unit(s) SubCutaneous <User Schedule>  fluconAZOLE   Tablet 200 milliGRAM(s) Oral daily  furosemide   Injectable 40 milliGRAM(s) IV Push two times a day  gabapentin 100 milliGRAM(s) Oral three times a day  hydroxychloroquine 200 milliGRAM(s) Oral daily  levETIRAcetam 500 milliGRAM(s) Oral daily  levETIRAcetam 250 milliGRAM(s) Oral <User Schedule>  metoprolol tartrate 50 milliGRAM(s) Oral two times a day  pantoprazole   Suspension 40 milliGRAM(s) Oral daily  sevelamer carbonate 800 milliGRAM(s) Oral three times a day with meals    MEDICATIONS  (PRN):  acetaminophen     Tablet .. 650 milliGRAM(s) Oral every 6 hours PRN Temp greater or equal to 38C (100.4F), Mild Pain (1 - 3)  ALBUTerol    90 MICROgram(s) HFA Inhaler 2 Puff(s) Inhalation every 6 hours PRN Bronchospasm  artificial tears (preservative free) Ophthalmic Solution 1 Drop(s) Both EYES two times a day PRN Dry Eyes  melatonin 3 milliGRAM(s) Oral at bedtime PRN Insomnia  ondansetron Injectable 4 milliGRAM(s) IV Push every 8 hours PRN Nausea and/or Vomiting  oxyCODONE    IR 5 milliGRAM(s) Oral every 4 hours PRN Moderate Pain (4 - 6)  senna 2 Tablet(s) Oral at bedtime PRN Constipation      RADIOLOGY/ADDITIONAL STUDIES:  < from: US Duplex Carotid Arteries Complete, Bilateral (22 @ 22:19) >    ACC: [id number] EXAM:  US DPLX CAROTIDS COMPL BI                          PROCEDURE DATE:  2022          INTERPRETATION:  CLINICAL INFORMATION: Preop.    COMPARISON: None available.    TECHNIQUE: Grayscale, color and spectral Doppler examination of both   carotid arteries was performed.    FINDINGS: No significant carotid calcifications seen.    No elevated velocities or abnormal waveforms are encountered.    Peak systolic velocities are as follows:    RIGHT:  PROX CCA = 49 cm/s  DIST CCA =61 cm/s  PROX ICA = 33 cm/s  DIST ICA = 51 cm/s  ECA = 40 cm/s  RIGHT ICA/CCA ratio = 0.8    LEFT:  PROX CCA = 72 cm/s  DIST CCA = 55 cm/s  PROX ICA = 52 cm/s  DIST ICA = 93 cm/s  ECA = 46 cm/s  LEFT ICA/CCA ratio = 1.7    Antegrade flow is noted within both vertebral arteries.    IMPRESSION: Normal ultrasound exam of the carotid arteries without   significant hemodynamic stenosis of either internal carotid artery.    Measurement of carotid stenosis is based on velocity parameters that   correlate the residual internal carotid diameter with that of the more   distal vessel in accordance with a method such as the [place] American   Symptomatic Carotid Endarterectomy Trial (NASCET).  < from: Xray Chest 1 View AP/PA. (22 @ 20:48) >    ACC: [id number] EXAM:  XR CHEST AP OR PA 1V                          PROCEDURE DATE:  2022          INTERPRETATION:  Portable chest radiograph    CLINICAL INFORMATION: Dyspnea, shortness of breath.    TECHNIQUE:  Portable  AP chest radiograph.    COMPARISON: 2022 chest .    FINDINGS:  CATHETERS AND TUBES: RIGHT IJ catheter tip in SVC.    PULMONARY: The visualized lungs are clear of airspace consolidations or   effusions.   No pneumothorax.    HEART/VASCULAR: The heart and mediastinum size and configuration are   within normal limits.    BONES: Visualized osseous structures are intact.    IMPRESSION: RIGHT IJ catheter tip in SVC.  No radiographic evidence of active chest disease.

## 2022-02-23 DIAGNOSIS — M32.9 SYSTEMIC LUPUS ERYTHEMATOSUS, UNSPECIFIED: ICD-10-CM

## 2022-02-23 DIAGNOSIS — A41.02 SEPSIS DUE TO METHICILLIN RESISTANT STAPHYLOCOCCUS AUREUS: ICD-10-CM

## 2022-02-23 DIAGNOSIS — J44.9 CHRONIC OBSTRUCTIVE PULMONARY DISEASE, UNSPECIFIED: ICD-10-CM

## 2022-02-23 DIAGNOSIS — T82.868A THROMBOSIS DUE TO VASCULAR PROSTHETIC DEVICES, IMPLANTS AND GRAFTS, INITIAL ENCOUNTER: ICD-10-CM

## 2022-02-23 DIAGNOSIS — M06.9 RHEUMATOID ARTHRITIS, UNSPECIFIED: ICD-10-CM

## 2022-02-23 DIAGNOSIS — E87.6 HYPOKALEMIA: ICD-10-CM

## 2022-02-23 DIAGNOSIS — I33.0 ACUTE AND SUBACUTE INFECTIVE ENDOCARDITIS: ICD-10-CM

## 2022-02-23 DIAGNOSIS — G40.909 EPILEPSY, UNSPECIFIED, NOT INTRACTABLE, WITHOUT STATUS EPILEPTICUS: ICD-10-CM

## 2022-02-23 DIAGNOSIS — E87.1 HYPO-OSMOLALITY AND HYPONATREMIA: ICD-10-CM
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
## 2022-02-23 DIAGNOSIS — L89.154 PRESSURE ULCER OF SACRAL REGION, STAGE 4: ICD-10-CM

## 2022-02-23 DIAGNOSIS — F17.210 NICOTINE DEPENDENCE, CIGARETTES, UNCOMPLICATED: ICD-10-CM

## 2022-02-23 DIAGNOSIS — Z88.5 ALLERGY STATUS TO NARCOTIC AGENT: ICD-10-CM

## 2022-02-23 DIAGNOSIS — N18.6 END STAGE RENAL DISEASE: ICD-10-CM

## 2022-02-23 DIAGNOSIS — I82.220 ACUTE EMBOLISM AND THROMBOSIS OF INFERIOR VENA CAVA: ICD-10-CM

## 2022-02-23 DIAGNOSIS — Z99.2 DEPENDENCE ON RENAL DIALYSIS: ICD-10-CM

## 2022-02-23 DIAGNOSIS — B37.7 CANDIDAL SEPSIS: ICD-10-CM

## 2022-02-23 DIAGNOSIS — I12.0 HYPERTENSIVE CHRONIC KIDNEY DISEASE WITH STAGE 5 CHRONIC KIDNEY DISEASE OR END STAGE RENAL DISEASE: ICD-10-CM

## 2022-02-23 DIAGNOSIS — T80.211A BLOODSTREAM INFECTION DUE TO CENTRAL VENOUS CATHETER, INITIAL ENCOUNTER: ICD-10-CM
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
## 2022-02-23 DIAGNOSIS — F11.10 OPIOID ABUSE, UNCOMPLICATED: ICD-10-CM

## 2022-02-23 DIAGNOSIS — R59.1 GENERALIZED ENLARGED LYMPH NODES: ICD-10-CM

## 2022-02-23 DIAGNOSIS — N64.89 OTHER SPECIFIED DISORDERS OF BREAST: ICD-10-CM

## 2022-02-23 DIAGNOSIS — E43 UNSPECIFIED SEVERE PROTEIN-CALORIE MALNUTRITION: ICD-10-CM

## 2022-02-23 DIAGNOSIS — F11.20 OPIOID DEPENDENCE, UNCOMPLICATED: ICD-10-CM

## 2022-02-23 DIAGNOSIS — I73.00 RAYNAUD'S SYNDROME WITHOUT GANGRENE: ICD-10-CM

## 2022-02-23 DIAGNOSIS — D63.1 ANEMIA IN CHRONIC KIDNEY DISEASE: ICD-10-CM

## 2022-02-23 DIAGNOSIS — R65.20 SEVERE SEPSIS WITHOUT SEPTIC SHOCK: ICD-10-CM

## 2022-02-23 LAB
ANION GAP SERPL CALC-SCNC: 10 MMOL/L — SIGNIFICANT CHANGE UP (ref 5–17)
BUN SERPL-MCNC: 14 MG/DL — SIGNIFICANT CHANGE UP (ref 7–23)
CALCIUM SERPL-MCNC: 8.3 MG/DL — LOW (ref 8.5–10.1)
CHLORIDE SERPL-SCNC: 98 MMOL/L — SIGNIFICANT CHANGE UP (ref 96–108)
CO2 SERPL-SCNC: 21 MMOL/L — LOW (ref 22–31)
CREAT SERPL-MCNC: 4.02 MG/DL — HIGH (ref 0.5–1.3)
GLUCOSE SERPL-MCNC: 83 MG/DL — SIGNIFICANT CHANGE UP (ref 70–99)
HCT VFR BLD CALC: 34.3 % — LOW (ref 34.5–45)
HGB BLD-MCNC: 10.6 G/DL — LOW (ref 11.5–15.5)
MCHC RBC-ENTMCNC: 28 PG — SIGNIFICANT CHANGE UP (ref 27–34)
MCHC RBC-ENTMCNC: 30.9 GM/DL — LOW (ref 32–36)
MCV RBC AUTO: 90.5 FL — SIGNIFICANT CHANGE UP (ref 80–100)
PLATELET # BLD AUTO: 241 K/UL — SIGNIFICANT CHANGE UP (ref 150–400)
POTASSIUM SERPL-MCNC: 4.5 MMOL/L — SIGNIFICANT CHANGE UP (ref 3.5–5.3)
POTASSIUM SERPL-SCNC: 4.5 MMOL/L — SIGNIFICANT CHANGE UP (ref 3.5–5.3)
RBC # BLD: 3.79 M/UL — LOW (ref 3.8–5.2)
RBC # FLD: 17.2 % — HIGH (ref 10.3–14.5)
SODIUM SERPL-SCNC: 129 MMOL/L — LOW (ref 135–145)
WBC # BLD: 6.28 K/UL — SIGNIFICANT CHANGE UP (ref 3.8–10.5)
WBC # FLD AUTO: 6.28 K/UL — SIGNIFICANT CHANGE UP (ref 3.8–10.5)

## 2022-02-23 PROCEDURE — 99498 ADVNCD CARE PLAN ADDL 30 MIN: CPT

## 2022-02-23 PROCEDURE — 99232 SBSQ HOSP IP/OBS MODERATE 35: CPT

## 2022-02-23 PROCEDURE — 99233 SBSQ HOSP IP/OBS HIGH 50: CPT

## 2022-02-23 PROCEDURE — 99497 ADVNCD CARE PLAN 30 MIN: CPT | Mod: 25

## 2022-02-23 RX ORDER — ALPRAZOLAM 0.25 MG
0.5 TABLET ORAL EVERY 6 HOURS
Refills: 0 | Status: DISCONTINUED | OUTPATIENT
Start: 2022-02-23 | End: 2022-02-25

## 2022-02-23 RX ORDER — FUROSEMIDE 40 MG
60 TABLET ORAL ONCE
Refills: 0 | Status: COMPLETED | OUTPATIENT
Start: 2022-02-23 | End: 2022-02-23

## 2022-02-23 RX ADMIN — OXYCODONE HYDROCHLORIDE 5 MILLIGRAM(S): 5 TABLET ORAL at 23:20

## 2022-02-23 RX ADMIN — OXYCODONE HYDROCHLORIDE 5 MILLIGRAM(S): 5 TABLET ORAL at 16:03

## 2022-02-23 RX ADMIN — OXYCODONE HYDROCHLORIDE 5 MILLIGRAM(S): 5 TABLET ORAL at 21:43

## 2022-02-23 RX ADMIN — Medication 0.5 MILLIGRAM(S): at 12:43

## 2022-02-23 RX ADMIN — OXYCODONE HYDROCHLORIDE 5 MILLIGRAM(S): 5 TABLET ORAL at 09:45

## 2022-02-23 RX ADMIN — Medication 50 MILLIGRAM(S): at 09:45

## 2022-02-23 RX ADMIN — APIXABAN 2.5 MILLIGRAM(S): 2.5 TABLET, FILM COATED ORAL at 09:45

## 2022-02-23 RX ADMIN — CHLORHEXIDINE GLUCONATE 1 APPLICATION(S): 213 SOLUTION TOPICAL at 19:09

## 2022-02-23 RX ADMIN — PANTOPRAZOLE SODIUM 40 MILLIGRAM(S): 20 TABLET, DELAYED RELEASE ORAL at 09:46

## 2022-02-23 RX ADMIN — ERYTHROPOIETIN 10000 UNIT(S): 10000 INJECTION, SOLUTION INTRAVENOUS; SUBCUTANEOUS at 16:04

## 2022-02-23 RX ADMIN — LEVETIRACETAM 500 MILLIGRAM(S): 250 TABLET, FILM COATED ORAL at 09:47

## 2022-02-23 RX ADMIN — OXYCODONE HYDROCHLORIDE 5 MILLIGRAM(S): 5 TABLET ORAL at 05:34

## 2022-02-23 RX ADMIN — ONDANSETRON 4 MILLIGRAM(S): 8 TABLET, FILM COATED ORAL at 12:43

## 2022-02-23 RX ADMIN — Medication 650 MILLIGRAM(S): at 19:43

## 2022-02-23 RX ADMIN — SEVELAMER CARBONATE 800 MILLIGRAM(S): 2400 POWDER, FOR SUSPENSION ORAL at 12:43

## 2022-02-23 RX ADMIN — Medication 3 MILLIGRAM(S): at 21:56

## 2022-02-23 RX ADMIN — FLUCONAZOLE 200 MILLIGRAM(S): 150 TABLET ORAL at 09:46

## 2022-02-23 RX ADMIN — OXYCODONE HYDROCHLORIDE 5 MILLIGRAM(S): 5 TABLET ORAL at 21:51

## 2022-02-23 RX ADMIN — Medication 1 APPLICATION(S): at 14:12

## 2022-02-23 RX ADMIN — Medication 200 MILLIGRAM(S): at 09:46

## 2022-02-23 RX ADMIN — SEVELAMER CARBONATE 800 MILLIGRAM(S): 2400 POWDER, FOR SUSPENSION ORAL at 09:47

## 2022-02-23 RX ADMIN — Medication 0.5 MILLIGRAM(S): at 21:48

## 2022-02-23 RX ADMIN — Medication 650 MILLIGRAM(S): at 16:03

## 2022-02-23 RX ADMIN — Medication 60 MILLIGRAM(S): at 12:43

## 2022-02-23 RX ADMIN — APIXABAN 2.5 MILLIGRAM(S): 2.5 TABLET, FILM COATED ORAL at 21:43

## 2022-02-23 RX ADMIN — LEVETIRACETAM 250 MILLIGRAM(S): 250 TABLET, FILM COATED ORAL at 17:55

## 2022-02-23 NOTE — PROGRESS NOTE ADULT - PROBLEM SELECTOR PROBLEM 1
Endocarditis with atrial thrombus

## 2022-02-23 NOTE — PROGRESS NOTE ADULT - PROBLEM SELECTOR PLAN 2
systolic patient is on dialysis appears compensated  , continue lopressor 50 mg PO BID , not candidate for ACE OR entresto due to renal failure
systolic patient is on dialysis appears compensated  , continue lopressor 50 mg PO BID , not candidate for ACE OR entresto due to renal failure. Hemodynamically compensated for now. Will follow prn.
systolic patient is on dialysis , continue lopressor 50 mg PO BID ,

## 2022-02-23 NOTE — CHART NOTE - NSCHARTNOTEFT_GEN_A_CORE
This SW and Palliative NP Cheryl Bal met with pt. at bedside to follow up and offer support. Pt. shared her feelings and how it has been a difficult time. Pt. has made the decision that after Friday she will stop Dialysis, understanding her time will be short, and return home with hospice. Feelings explored and support provided. Pt. lives with her brother who is aware of plan and supportive, as per pt. Floor SW to place Hospice referral to [facility]. Our team will continue to follow.

## 2022-02-23 NOTE — PHYSICAL THERAPY INITIAL EVALUATION ADULT - ACTIVE RANGE OF MOTION EXAMINATION, REHAB EVAL
Except L Knee Flex Contracture to 120 degress/bilateral upper extremity Active ROM was WFL (within functional limits)/bilateral  lower extremity Active ROM was WFL (within functional limits)

## 2022-02-23 NOTE — PHYSICAL THERAPY INITIAL EVALUATION ADULT - DIAGNOSIS, PT EVAL
Endocarditis, Fugeremia, Sepsis, CHF, Atrial Mass, IVC Mass, Heroin & Cocaine Abuse, ANCA Vasculitis, SLE, RA, Seizures, COPD, Increased Breast Density with Lymphadenopathy

## 2022-02-23 NOTE — PHYSICAL THERAPY INITIAL EVALUATION ADULT - PLANNED THERAPY INTERVENTIONS, PT EVAL
NO further Physical Therapy Needs at this time as patient is already at bedbound baseline and is refusing Physical Therapy Services. Plan is for Home Hospice after last HD Session this Friday. Will d/c Patient off Physical Therapy Program at this time. ANNIE Rocha made aware and in agreement with plan.

## 2022-02-23 NOTE — PROGRESS NOTE ADULT - PROBLEM SELECTOR PLAN 3
moderate MR , moderate to severe TR   continue monitor.

## 2022-02-23 NOTE — PROGRESS NOTE ADULT - CONVERSATION DETAILS
The role of Palliative medicine was reviewed with the pt. She stated that she understands that she has a blood infection and cannot have a permanent HD cath placed. She will have HD on Friday and when that cath is D/C she requests home with hospice care [facility]. She became tearful and anxious as she stated that she knows her time is limited and she wants to be home. She stated that her brother and former  will take care of her at home. Supportive care given. TRIST reviewed and completed with DNR/DNI. Discussed with Dr Gu and Josefina VALENCIA

## 2022-02-23 NOTE — PROGRESS NOTE ADULT - PROBLEM SELECTOR PLAN 1
Patient with ESRD on hemodialysis developed bacteremia , fungemia , persistent fever , noted to right atrial mobile echogenic density  , IVC thrombosis , possible endocarditis  patient was evaluated by CT surgery Dr Raya at [facility] ,deemed to be not a candidate for any surgical intervention ,as patient has multiple comorbid condition active drug abuse , decubitus   poor nutritional status  he also discussed with his colleague dr De Paz )   continue IV antibiotic , antifungal   , anticoagulation eliquis (started this admission ) , monitor     Poor prognosis ,consider  palliative evaluation.
Patient with ESRD on hemodialysis developed bacteremia , fungemia , persistent fever , noted to right atrial mobile echogenic density  , IVC thrombosis , possible endocarditis  patient was evaluated by CT surgery Dr Raya at [facility] ,deemed to be not a candidate for any surgical intervention ,as patient has multiple comorbid condition active drug abuse , decubitus   poor nutritional status  he also discussed with his colleague dr D ePaz )   continue IV antibiotic , antifungal   , anticoagulation eliquis (started this admission ) , monitor     Poor prognosis ,
Patient with ESRD on hemodialysis developed bacteremia , fungemia , persistent , noted to right atrial mobile echogenic density  , IVC thrombosis , possible endocarditis thrombus , patient was evaluated by CT surgery Dr Raya at [facility] patient is on dialysis , continue lopressor 50 mg PO BID ,, as patient has multiple comorbid condition active drug abuse , decubitus   poor nutritional status  not a candidate for surgical intervention  who discussed with his colleague dr De Paz also )   continue IV antibiotic , antifungal   , anticoagulation eliquis (started this admission ) , monitor     Poor prognosis ,consider  palliative evaluation.

## 2022-02-23 NOTE — PHYSICAL THERAPY INITIAL EVALUATION ADULT - LEVEL OF INDEPENDENCE: SUPINE/SIT, REHAB EVAL
Patient refused OOB PT Functional Mobility Assessment at this time. Patient states, " I do not want Physical Therapy Services at this time. I do not walk. I am going to hospice after my last HD Session this friday."

## 2022-02-23 NOTE — PHYSICAL THERAPY INITIAL EVALUATION ADULT - GENERAL OBSERVATIONS, REHAB EVAL
Pt rec'd supine in bed in NAD with HM (NSR 88), NC (2L/min) both intact. Pt reported generalized pain all over at rest

## 2022-02-24 LAB
ANION GAP SERPL CALC-SCNC: 10 MMOL/L — SIGNIFICANT CHANGE UP (ref 5–17)
BUN SERPL-MCNC: 23 MG/DL — SIGNIFICANT CHANGE UP (ref 7–23)
CALCIUM SERPL-MCNC: 7.8 MG/DL — LOW (ref 8.5–10.1)
CHLORIDE SERPL-SCNC: 97 MMOL/L — SIGNIFICANT CHANGE UP (ref 96–108)
CO2 SERPL-SCNC: 23 MMOL/L — SIGNIFICANT CHANGE UP (ref 22–31)
CREAT SERPL-MCNC: 5.13 MG/DL — HIGH (ref 0.5–1.3)
CULTURE RESULTS: SIGNIFICANT CHANGE UP
GLUCOSE SERPL-MCNC: 75 MG/DL — SIGNIFICANT CHANGE UP (ref 70–99)
POTASSIUM SERPL-MCNC: 4.6 MMOL/L — SIGNIFICANT CHANGE UP (ref 3.5–5.3)
POTASSIUM SERPL-SCNC: 4.6 MMOL/L — SIGNIFICANT CHANGE UP (ref 3.5–5.3)
SODIUM SERPL-SCNC: 130 MMOL/L — LOW (ref 135–145)
SPECIMEN SOURCE: SIGNIFICANT CHANGE UP

## 2022-02-24 PROCEDURE — 99232 SBSQ HOSP IP/OBS MODERATE 35: CPT

## 2022-02-24 PROCEDURE — 99233 SBSQ HOSP IP/OBS HIGH 50: CPT

## 2022-02-24 RX ORDER — METOCLOPRAMIDE HCL 10 MG
5 TABLET ORAL ONCE
Refills: 0 | Status: COMPLETED | OUTPATIENT
Start: 2022-02-24 | End: 2022-02-24

## 2022-02-24 RX ORDER — OXYCODONE HYDROCHLORIDE 5 MG/1
10 TABLET ORAL EVERY 6 HOURS
Refills: 0 | Status: DISCONTINUED | OUTPATIENT
Start: 2022-02-24 | End: 2022-02-25

## 2022-02-24 RX ADMIN — PANTOPRAZOLE SODIUM 40 MILLIGRAM(S): 20 TABLET, DELAYED RELEASE ORAL at 11:01

## 2022-02-24 RX ADMIN — APIXABAN 2.5 MILLIGRAM(S): 2.5 TABLET, FILM COATED ORAL at 11:02

## 2022-02-24 RX ADMIN — OXYCODONE HYDROCHLORIDE 10 MILLIGRAM(S): 5 TABLET ORAL at 23:35

## 2022-02-24 RX ADMIN — SEVELAMER CARBONATE 800 MILLIGRAM(S): 2400 POWDER, FOR SUSPENSION ORAL at 12:01

## 2022-02-24 RX ADMIN — Medication 1 APPLICATION(S): at 11:00

## 2022-02-24 RX ADMIN — Medication 200 MILLIGRAM(S): at 11:02

## 2022-02-24 RX ADMIN — SEVELAMER CARBONATE 800 MILLIGRAM(S): 2400 POWDER, FOR SUSPENSION ORAL at 08:18

## 2022-02-24 RX ADMIN — SEVELAMER CARBONATE 800 MILLIGRAM(S): 2400 POWDER, FOR SUSPENSION ORAL at 18:51

## 2022-02-24 RX ADMIN — Medication 5 MILLIGRAM(S): at 18:51

## 2022-02-24 RX ADMIN — APIXABAN 2.5 MILLIGRAM(S): 2.5 TABLET, FILM COATED ORAL at 22:36

## 2022-02-24 RX ADMIN — OXYCODONE HYDROCHLORIDE 5 MILLIGRAM(S): 5 TABLET ORAL at 06:50

## 2022-02-24 RX ADMIN — Medication 3 MILLIGRAM(S): at 23:38

## 2022-02-24 RX ADMIN — OXYCODONE HYDROCHLORIDE 10 MILLIGRAM(S): 5 TABLET ORAL at 15:39

## 2022-02-24 RX ADMIN — Medication 0.5 MILLIGRAM(S): at 12:14

## 2022-02-24 RX ADMIN — OXYCODONE HYDROCHLORIDE 10 MILLIGRAM(S): 5 TABLET ORAL at 22:35

## 2022-02-24 RX ADMIN — Medication 0.5 MILLIGRAM(S): at 06:22

## 2022-02-24 RX ADMIN — LEVETIRACETAM 500 MILLIGRAM(S): 250 TABLET, FILM COATED ORAL at 11:01

## 2022-02-24 RX ADMIN — Medication 0.5 MILLIGRAM(S): at 20:56

## 2022-02-24 RX ADMIN — Medication 50 MILLIGRAM(S): at 11:01

## 2022-02-24 RX ADMIN — FLUCONAZOLE 200 MILLIGRAM(S): 150 TABLET ORAL at 11:01

## 2022-02-24 RX ADMIN — CHLORHEXIDINE GLUCONATE 1 APPLICATION(S): 213 SOLUTION TOPICAL at 11:52

## 2022-02-24 RX ADMIN — OXYCODONE HYDROCHLORIDE 5 MILLIGRAM(S): 5 TABLET ORAL at 06:22

## 2022-02-24 NOTE — PROGRESS NOTE ADULT - PROVIDER SPECIALTY LIST ADULT
Cardiology
Hospitalist
Nephrology
Nephrology
Infectious Disease
Infectious Disease
Nephrology
Palliative Care
Nephrology
Hospitalist
Cardiology
Palliative Care
Cardiology

## 2022-02-24 NOTE — PROGRESS NOTE ADULT - SUBJECTIVE AND OBJECTIVE BOX
HPI: Pt is a 41y old Female former smoker (1/4 PPD, quit 12/2021) with a medical history of Lupus, RA, ANCA vasculitis with ESRD on HD (M/W/F), GERD, seizures, epilepsy, depression, COPD, HTN, substance abuse (marijuana, cocaine), IVDA (heroin), C. 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 Hospital course complicated by MRSA bacteremia 2/4 (treated with IV Vanco), dialysis catheter tip thrombus noted with permacath removed and RIJ Shiley placed 2/11, C.abicans and C. tropicalis fungemia 2/11 (IV Caspofungin X 4 days transitioned to IV Diflucan 2/16). 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 Transferred to [facility] for CT surgery evaluation. TTE at [facility] revealed LVEF 20-25%, Severely decreased global left ventricular systolic function, catheter seen in the right atrium with a highly mobile echogenicity visualized on aortic valve, thrombus, an mass or thrombus seen in the inferior vena cava measuring 3.9 x 1.5 x 1.4 cm. No intervention was planned per CT surgery. As per CTS the patient requires further Heme/Onc evaluation as she underwent a CT A/P at , which revealed an abnormal breast density and extensive lymphadenopathy. Additionally the patient will need eventual ischemic workup for decreased LVEF. Palliative Medicine Consult to further establish GOC asa we had seen the pt last adm  2/22/22 Seen and examined at bedside this am. Awake and alert. States she has chronic pain R/T arthritis. Denies dyspnea at rest.  2/23/22 Seen and examined  at bedside. States that she knows she has no option than to terminate HD and go home on hospice. Tearful and anxious  2/24/22 Seen and examined at bedside. Cont to C/O generalized pain. Slept well after Xanax last PM. Agrees to go home as soon as possible and will have no further HD as per her discussion with Lucie Jennings and Riccardo.    PAIN: (X )Yes   ( )No  Level: mod-severe  Location: generalized  Intensity:   ? /10  Quality: ache  Aggravating Factors: unknown  Alleviating Factors: opiates  Impact on ADLs: severe    DYSPNEA: (X ) Yes  ( ) No  Level: increased on exertion    PAST MEDICAL & SURGICAL HISTORY:  Sepsis  HTN (hypertension)  COPD (chronic obstructive pulmonary disease)  Depression  Epilepsy  GERD (gastroesophageal reflux disease)  Bacteremia  Systemic lupus erythematosus  Aphonia  H/O tubal ligation  H/O appendicitis  Gall bladder stones  H/O tracheostomy  S/P percutaneous endoscopic gastrostomy (PEG) tube placement        SOCIAL HX:  Lives with brother  Hx opiate tolerance ( )YES  ( )NO    Baseline ADLs  (Prior to Admission)  ( ) Independent   ( )Dependent    FAMILY HISTORY:  Family history of cardiomyopathy (Mother)        Review of Systems:    Anxiety-yes H/O  Depression-yes H/O  Physical Discomfort-generalized  Dyspnea-on exertion  Anorexia-yes  Fatigue-severe  Weakness-severe    All other systems reviewed and negative    PHYSICAL EXAM:  ICU Vital Signs Last 24 Hrs  T(C): 36.3 (23 Feb 2022 20:01), Max: 39.4 (23 Feb 2022 16:00)  T(F): 97.3 (23 Feb 2022 20:01), Max: 102.9 (23 Feb 2022 16:00)  HR: 66 (24 Feb 2022 08:04) (66 - 80)  BP: 107/74 (24 Feb 2022 08:04) (87/56 - 107/74)  RR: 18 (24 Feb 2022 08:04) (18 - 18)  SpO2: 100% (24 Feb 2022 08:04) (97% - 100%)      PPSV2: 40 %    General: Frail middle aged female in bed in NAD  Mental Status: A&O X3  HEENT: oral mucosa dry  Lungs: clear diminished edel  Cardiac: S1S2+  GI: abd soft NT ND + BS  : abd soft NT ND + BS  Ext: BLAKELY on bed  Neuro: no focal def      LABS:                               10.6   6.28  )-----------( 241      ( 23 Feb 2022 07:35 )             34.3                   02-24    130<L>  |  97  |  23  ----------------------------<  75  4.6   |  23  |  5.13<H>    Ca    7.8<L>      24 Feb 2022 07:03      TPro  6.0  /  Alb  1.7<L>  /  TBili  0.3  /  DBili  x   /  AST  7<L>  /  ALT  <6<L>  /  AlkPhos  80  02-21  Albumin: Albumin, Serum: 1.7 g/dL (02-21 @ 11:29)    Allergies  morphine (Rash)  Intolerances  MEDICATIONS  (STANDING):  apixaban 2.5 milliGRAM(s) Oral two times a day  chlorhexidine 2% Cloths 1 Application(s) Topical daily  collagenase Ointment 1 Application(s) Topical daily  epoetin amee-epbx (RETACRIT) Injectable 50578 Unit(s) SubCutaneous <User Schedule>  fluconAZOLE   Tablet 200 milliGRAM(s) Oral daily  gabapentin 100 milliGRAM(s) Oral three times a day  hydroxychloroquine 200 milliGRAM(s) Oral daily  levETIRAcetam 500 milliGRAM(s) Oral daily  levETIRAcetam 250 milliGRAM(s) Oral <User Schedule>  metoprolol tartrate 50 milliGRAM(s) Oral two times a day  pantoprazole   Suspension 40 milliGRAM(s) Oral daily  sevelamer carbonate 800 milliGRAM(s) Oral three times a day with meals    MEDICATIONS  (PRN):  acetaminophen     Tablet .. 650 milliGRAM(s) Oral every 6 hours PRN Temp greater or equal to 38C (100.4F), Mild Pain (1 - 3)  ALBUTerol    90 MICROgram(s) HFA Inhaler 2 Puff(s) Inhalation every 6 hours PRN Bronchospasm  ALPRAZolam 0.5 milliGRAM(s) Oral every 6 hours PRN anxiety  artificial tears (preservative free) Ophthalmic Solution 1 Drop(s) Both EYES two times a day PRN Dry Eyes  melatonin 3 milliGRAM(s) Oral at bedtime PRN Insomnia  ondansetron Injectable 4 milliGRAM(s) IV Push every 8 hours PRN Nausea and/or Vomiting  oxyCODONE    IR 5 milliGRAM(s) Oral every 4 hours PRN Moderate Pain (4 - 6)  senna 2 Tablet(s) Oral at bedtime PRN Constipation    ALPRAZolam 0.5 milliGRAM(s) Oral every 6 hours PRN anxiety  artificial tears (preservative free) Ophthalmic Solution 1 Drop(s) Both EYES two times a day PRN Dry Eyes  melatonin 3 milliGRAM(s) Oral at bedtime PRN Insomnia  ondansetron Injectable 4 milliGRAM(s) IV Push every 8 hours PRN Nausea and/or Vomiting  oxyCODONE    IR 5 milliGRAM(s) Oral every 4 hours PRN Moderate Pain (4 - 6)  senna 2 Tablet(s) Oral at bedtime PRN Constipation      
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 diff colitis 2021, bacteremia, fungemia,   stage 4 sacral decub since , multiple episodes of seizures & syncope with HD, s/p hospitalization  12/15-21 for seizures (CSF negative), hospitalized at [facility] 22-22 after found unresponsive at home after being left alone for an hour, diagnosed with Aspiration PNA requiring intubation, LLL infiltrate s/p 7 day course of Vanco/Zosyn, discharged 22 and readmitted to [facility] 22 after LOC during HD. Hospital course complicated by MRSA bacteremia  (treated with IV Vanco), dialysis catheter tip thrombus noted with permacath removed and RIJ Shiley placed , C.abicans and C. tropicalis fungemia  (IV Caspofungin  transitioned to  Diflucan ).   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 Transferred to [facility] for CT surgery evaluation on .   TTE at [facility] revealed LVEF 20-25%, Severely decreased global left ventricular systolic function, catheter seen in the right atrium with a highly mobile echogenicity visualized on aortic valve,  thrombus, an mass or thrombus seen in the inferior vena cava measuring 3.9 x 1.5 x 1.4 cm.   Pt was transferred back to   as no intervention was planned per CT surgery.   This AM - Cardiology Dr Palla spoke to Dr Raya  who confirmed this   In addition  patient requires further Heme/Onc evaluation as she underwent a CT   which revealed an abnormal breast density and extensive lymphadenopathy.     today    seen on HD  earlier today       MEDICATIONS  (STANDING):  apixaban 2.5 milliGRAM(s) Oral two times a day  chlorhexidine 2% Cloths 1 Application(s) Topical daily  collagenase Ointment 1 Application(s) Topical daily  epoetin amee-epbx (RETACRIT) Injectable 26333 Unit(s) SubCutaneous <User Schedule>  fluconAZOLE   Tablet 200 milliGRAM(s) Oral daily  furosemide   Injectable 40 milliGRAM(s) IV Push two times a day  gabapentin 100 milliGRAM(s) Oral three times a day  hydroxychloroquine 200 milliGRAM(s) Oral daily  levETIRAcetam 500 milliGRAM(s) Oral daily  levETIRAcetam 250 milliGRAM(s) Oral <User Schedule>  metoprolol tartrate 50 milliGRAM(s) Oral two times a day  pantoprazole   Suspension 40 milliGRAM(s) Oral daily  sevelamer carbonate 800 milliGRAM(s) Oral three times a day with meals      Intolerances      REVIEW OF SYSTEMS:    CONSTITUTIONAL: No weakness, fevers or chills  EYES/ENT: No visual changes;  No vertigo or throat pain   NECK: No pain or stiffness  RESPIRATORY: No cough, wheezing, hemoptysis; No shortness of breath  CARDIOVASCULAR: No chest pain or palpitations  GASTROINTESTINAL: No abdominal or epigastric pain. No nausea, vomiting, or hematemesis; No diarrhea or constipation. No melena or hematochezia.  GENITOURINARY: No dysuria, frequency or hematuria  NEUROLOGICAL: No numbness or weakness  SKIN: No itching, burning, rashes, or lesions   All other review of systems is negative unless indicated above.    VITAL:  Vital Signs Last 24 Hrs  T(C): 37.7 (2022 14:49), Max: 37.7 (2022 14:49)  T(F): 99.8 (2022 14:49), Max: 99.8 (2022 14:49)  HR: 102 (2022 14:56) (73 - 102)  BP: 167/106 (2022 14:56) (117/70 - 170/97)  BP(mean): --  RR: 16 (2022 14:56) (14 - 18)  SpO2: 99% (2022 08:06) (99% - 99%)  PHYSICAL EXAM:    Constitutional: frail, appears >> stated age  HEENT:  EOMI,  MM, poor dentition  Neck: No LAD, No JVD  Respiratory: dim  Cardiovascular: S1 and S2  Gastrointestinal: BS+, soft, NT/ND  Extremities: No peripheral edema  Neurological: A/O x 3, no focal deficits  : No Salgado  Skin: No rashes  Access: [facility]     LABS:                                   8.6    8.90  )-----------( 405      ( 2022 11:29 )             27.7               134    |  99     |  14     ----------------------------<  84        2022 11:29  3.7     |  27     |  4.05     128    |  93     |  19.2   ----------------------------<  93        2022 04:23  3.8     |  21.0   |  4.00     131    |  99     |  12     ----------------------------<  107       2022 08:11  3.5     |  24     |  3.17     Ca    7.9        2022 11:29  Ca    7.9        2022 04:23    Phos  3.4       2022 11:29    Mg     2.2       2022 11:29  Mg     1.7       2022 04:23    TPro  6.0    /  Alb  1.7    /  TBili  0.3    /        2022 11:29  DBili  x      /  AST  7      /  ALT  <6     /  AlkPhos  80       TPro  5.6    /  Alb  2.6    /  TBili  0.3    /        2022 04:23  DBili  x      /  AST  7      /  ALT  <5     /  AlkPhos  94         Urine Studies:  Urinalysis Basic - ( 2022 23:01 )    Color: Yellow / Appearance: Slightly Turbid / S.010 / pH: x  Gluc: x / Ketone: Negative  / Bili: Negative / Urobili: Negative mg/dL   Blood: x / Protein: 30 mg/dL / Nitrite: Negative   Leuk Esterase: Trace / RBC: 0-2 /HPF / WBC 0-2 /HPF   Sq Epi: x / Non Sq Epi: Many / Bacteria: x      RADIOLOGY & ADDITIONAL STUDIES:                  
Chief Complaint: Loss of consciousness    Interval History: Yesterday late afternoon patient again with fever. No new complaints at that time. No acute events overnight. Reports that she feels generally okay but is having intermittent fevers again. On vancomycin IV post dialysis for MRSA bacteremia, Diflucan for C.albicans and C.tropicalis fungemia. Repeat blood cultures from 2/12 and 2/15 so far with no growth to date. After lengthy discussion with Nephrology and ID, given patient's past and current issues with vascular access, risk for stenosis / fibrosis to vessels with repeated line changes, difficulty getting labs without such access, decision made not to remove Shiley and instead draw blood cultures from Shiley after dialysis today. GRAHAM ordered to r/o subacute endocarditis. She is anemic today but no overt bleeding or hemolysis. Ordered for1u PRBC with dialysis. Lastly, she continues to receive wound care to her sacral decubitus wound, appears to be improving, no sign of infection there.    Transfered to [facility]. Evaluated and deemed not to be a good surgical candidate and as such no definitive treatment available for patients condition and palliative care evaluation recommended    SUBJECTIVE: Weak/Tired. Frustrated over present situation. Wants to go home.  Fever overnight x 1; Similar to prior episodes..     ROS: Multi system review is comprehensively negative x 10 systems except for chronic severe physical deconditioning, sacral decubitus wound    Physical Exam:  T(C): 36.1 (02-24-22 @ 18:31), Max: 36.3 (02-23-22 @ 20:01)  HR: 66 (02-24-22 @ 18:31) (66 - 80)  BP: 94/67 (02-24-22 @ 18:31) (87/56 - 107/74)  RR: 18 (02-24-22 @ 18:31) (18 - 18)  SpO2: 95% (02-24-22 @ 18:31) (95% - 100%)  GEN: No acute distress; Frail appearing  HEENT: NCAT PERRL EOMI  NECK:neck supple  CHEST: CTA b/l, no wheezes, rales or rhonchi  CVS: S1 S2 normal, tachycardic to 100  ABD: Soft, non tender, non distended, no rebound, no guarding  EXT: No calf tenderness, no erythema  SKIN: Warm, dry, stage IV sacral ulcer without locoregional tenderness, induration, fluctuance, no purulence  NEURO: Alert, awake, grossly without deficits    Labs:                             10.6   6.28  )-----------( 241      ( 23 Feb 2022 07:35 )             34.3     02-24    130<L>  |  97  |  23  ----------------------------<  75  4.6   |  23  |  5.13<H>    Ca    7.8<L>      24 Feb 2022 07:03      COVID-19 PCR: NotDetec (19 Feb 2022 12:40)  COVID-19 PCR: NotDetec (16 Feb 2022 14:25)  COVID-19 PCR: NotDetec (13 Feb 2022 14:15)  COVID-19 PCR: NotDetec (10 Feb 2022 17:10)  SARS-CoV-2: NotDetec (04 Feb 2022 14:06)  COVID-19 PCR: NotDetec (31 Jan 2022 12:32)  COVID-19 PCR: NotDetec (25 Jan 2022 22:00)  SARS-CoV-2: NotDetec (18 Jan 2022 14:06)  COVID-19 PCR: NotDetec (15 Dec 2021 13:48)  COVID-19 PCR: NotDetec (05 Dec 2021 05:11)  SARS-CoV-2: NotDetec (29 Nov 2021 23:48)  COVID-19 PCR: NotDetec (18 Nov 2021 13:00)  COVID-19 PCR: NotDetec (25 Oct 2021 16:44)  COVID-19 PCR: NotDetec (21 Oct 2021 20:30)  COVID-19 PCR: NotDetec (18 Oct 2021 05:50)  COVID-19 PCR: NotDetec (11 Oct 2021 22:25)    CAPILLARY BLOOD GLUCOSE                                10.6   6.28  )-----------( 241      ( 23 Feb 2022 07:35 )             34.3     02-24    130<L>  |  97  |  23  ----------------------------<  75  4.6   |  23  |  5.13<H>    Ca    7.8<L>      24 Feb 2022 07:03      COVID-19 PCR: NotDetec (19 Feb 2022 12:40)  COVID-19 PCR: NotDetec (16 Feb 2022 14:25)  COVID-19 PCR: NotDetec (13 Feb 2022 14:15)  COVID-19 PCR: NotDetec (10 Feb 2022 17:10)  SARS-CoV-2: NotDetec (04 Feb 2022 14:06)  COVID-19 PCR: NotDetec (31 Jan 2022 12:32)  COVID-19 PCR: NotDetec (25 Jan 2022 22:00)  SARS-CoV-2: NotDetec (18 Jan 2022 14:06)  COVID-19 PCR: NotDetec (15 Dec 2021 13:48)  COVID-19 PCR: NotDetec (05 Dec 2021 05:11)  SARS-CoV-2: NotDetec (29 Nov 2021 23:48)  COVID-19 PCR: NotDetec (18 Nov 2021 13:00)  COVID-19 PCR: NotDetec (25 Oct 2021 16:44)  COVID-19 PCR: NotDetec (21 Oct 2021 20:30)  COVID-19 PCR: NotDetec (18 Oct 2021 05:50)  COVID-19 PCR: NotDetec (11 Oct 2021 22:25)    CAPILLARY BLOOD GLUCOSE                                8.6    8.90  )-----------( 405      ( 21 Feb 2022 11:29 )             27.7     02-21    134<L>  |  99  |  14  ----------------------------<  84  3.7   |  27  |  4.05<H>    Ca    7.9<L>      21 Feb 2022 11:29  Phos  3.4     02-21  Mg     2.2     02-21    TPro  6.0  /  Alb  1.7<L>  /  TBili  0.3  /  DBili  x   /  AST  7<L>  /  ALT  <6<L>  /  AlkPhos  80  02-21    COVID-19 PCR: NotDetec (19 Feb 2022 12:40)  COVID-19 PCR: NotDetec (16 Feb 2022 14:25)  COVID-19 PCR: NotDetec (13 Feb 2022 14:15)  COVID-19 PCR: NotDetec (10 Feb 2022 17:10)  SARS-CoV-2: NotDetec (04 Feb 2022 14:06)  COVID-19 PCR: NotDetec (31 Jan 2022 12:32)  COVID-19 PCR: NotDetec (25 Jan 2022 22:00)  SARS-CoV-2: NotDetec (18 Jan 2022 14:06)  COVID-19 PCR: NotDetec (15 Dec 2021 13:48)  COVID-19 PCR: NotDetec (05 Dec 2021 05:11)  SARS-CoV-2: NotDetec (29 Nov 2021 23:48)  COVID-19 PCR: NotDetec (18 Nov 2021 13:00)  COVID-19 PCR: NotDetec (25 Oct 2021 16:44)  COVID-19 PCR: NotDetec (21 Oct 2021 20:30)  COVID-19 PCR: NotDetec (18 Oct 2021 05:50)  COVID-19 PCR: NotDetec (11 Oct 2021 22:25)    CAPILLARY BLOOD GLUCOSE          Culture - Blood (collected 19 Feb 2022 04:23)  Source: .Blood Blood-Peripheral  Preliminary Report (21 Feb 2022 05:00):    No growth at 48 hours                                         8.0    7.90  )-----------( 225      ( 19 Feb 2022 04:23 )             23.8     02-19    128<L>  |  93<L>  |  19.2  ----------------------------<  93  3.8   |  21.0<L>  |  4.00<H>    Ca    7.9<L>      19 Feb 2022 04:23  Mg     1.7     02-19    TPro  5.6<L>  /  Alb  2.6<L>  /  TBili  0.3<L>  /  DBili  x   /  AST  7   /  ALT  <5  /  AlkPhos  94  02-19    COVID-19 PCR: NotDetec (19 Feb 2022 12:40)  COVID-19 PCR: NotDetec (16 Feb 2022 14:25)  COVID-19 PCR: NotDetec (13 Feb 2022 14:15)  COVID-19 PCR: NotDetec (10 Feb 2022 17:10)  SARS-CoV-2: NotDetec (04 Feb 2022 14:06)  COVID-19 PCR: NotDetec (31 Jan 2022 12:32)  COVID-19 PCR: NotDetec (25 Jan 2022 22:00)  SARS-CoV-2: NotDetec (18 Jan 2022 14:06)  COVID-19 PCR: NotDetec (15 Dec 2021 13:48)  COVID-19 PCR: NotDetec (05 Dec 2021 05:11)  SARS-CoV-2: NotDetec (29 Nov 2021 23:48)  COVID-19 PCR: NotDetec (18 Nov 2021 13:00)  COVID-19 PCR: NotDetec (25 Oct 2021 16:44)  COVID-19 PCR: NotDetec (21 Oct 2021 20:30)  COVID-19 PCR: NotDetec (18 Oct 2021 05:50)  COVID-19 PCR: NotDetec (11 Oct 2021 22:25)    CAPILLARY BLOOD GLUCOSE             CBC 2/17                        7.3    9.86  )--------( 269              22.6     BMP 2/17    130  |  97  |  19  ---------------------<  88  3.2   |  23  |  4.33    Ca 7.8  Phos 4.0    TPro  6.1  /  Alb  1.8  /  TBili  0.3  /  DBili  x   /  AST  7  /  ALT  8  /  AlkPhos  76      Lactate 6.1 --> 0.9    Micro:  COVID19 PCR 2/15: Negative  Blood culture 2/15: No growth to date  COVID19 PCR 2/13: Negative  Cdiff PCR 2/12: Negative  Blood culture 2/12: No growth to date  Blood culture 2/11: C.albicans, C.tropicalis  COVID19 PCR 2/10: Negative  Blood culture 2/8: Negative  Blood culture 2/6: Negative  Blood culture 2/4: MRSA    Imaging:  CT chest 2/12: Near complete resolution of previously identified mucoid debris in bronchus intermedius and bilateral lower lobe bronchi with tree-in-bud nodularity, right greater than left. No consolidation or pleural effusion. Asymmetric right breast density with nonspecific soft tissue infiltration extending to right chest wall.    CT abd/pelvis 2/12: Rectal tube identified, tip at the level of the rectosigmoid junction containing contrast. Mild wall thickening of the rectosigmoid junction. No bowel obstruction. Reidentified bulky retroperitoneal lymphadenopathy with cluster of multiple enlarged left paraaortic and pelvic lymph nodes, stable compared to recent prior study.    Cardiac Testing:  Tele 2/16-17: Sinus tachycardia,     EKG 2/12: Sinus tachycardia,     TTE 2/8 : The left ventricle is normal in size. Moderately reduced left ventricular systolic function. Estimated left ventricular ejection fraction is 40%. Normal appearing right atrium. A catheter wire is seen in the right atrium. Normal aortic valve structure and function. Trivial aortic regurgitation is present. Normal appearing mitral valve structure and function. Mild (1+) mitral regurgitation is present.        
PCP:    REQUESTING PHYSICIAN:    REASON FOR CONSULT:    CHIEF COMPLAINT:    HPI:  41 year old female former smoker (PPD, quit 2021) with a medical history of Lupus, RA, ANCA vasculitis with ESRD on HD (M/W/F), GERD, seizures, epilepsy, depression, COPD, HTN, substance abuse (marijuana, cocaine), IVDA (heroin), C. diff colitis 2021, bacteremia, fungemia, resp failure s/p Trach/PEG 20 (since reversed), stage 4 sacral decub since , multiple episodes of seizures & syncope with HD, s/p hospitalization 12/15-21 for seizures (CSF negative), hospitalized at [facility] 22-22 after found unresponsive at home after being left alone for an hour, diagnosed with Aspiration PNA requiring intubation, LLL infiltrate s/p 7 day course of Vanco/Zosyn, discharged 22 and readmitted to [facility] 22 after LOC during HD. Hospital course complicated by MRSA bacteremia  (treated with IV Vanco), dialysis catheter tip thrombus noted with permacath removed and RIJ Shiley placed , C.abicans and C. tropicalis fungemia  (IV Caspofungin X 4 days transitioned to IV Diflucan ). 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 Transferred to [facility] for CT surgery evaluation.  TTE at [facility] revealed LVEF 20-25%, Severely decreased global left ventricular systolic function, catheter seen in the right atrium with a highly mobile echogenicity visualized on aortic valve, thrombus, an mass or thrombus seen in the inferior vena cava measuring 3.9 x 1.5 x 1.4 cm. No intervention was planned per CT surgery. As per CTS the patient requires further Heme/Onc evaluation as she underwent a CT A/P at , which revealed an abnormal breast density and extensive lymphadenopathy. Additionally the patient will need eventual ischemic workup for decreased LVEF.    Patient  was started on eliquis during this admission for IVC thrombus ,  patient denies any chest pain or shortness of breath   22: Pt denies chest pain. HD anticipated.    PAST MEDICAL & SURGICAL HISTORY:  Sepsis    HTN (hypertension)    COPD (chronic obstructive pulmonary disease)    Depression    Epilepsy    GERD (gastroesophageal reflux disease)    Bacteremia    Systemic lupus erythematosus    Aphonia    H/O tubal ligation    H/O appendicitis    Gall bladder stones    H/O tracheostomy    S/P percutaneous endoscopic gastrostomy (PEG) tube placement        SOCIAL HISTORY:    FAMILY HISTORY:  Family history of cardiomyopathy (Mother)        ALLERGIES:  Allergies    morphine (Rash)    Intolerances        MEDICATIONS:    MEDICATIONS  (STANDING):  apixaban 2.5 milliGRAM(s) Oral two times a day  chlorhexidine 2% Cloths 1 Application(s) Topical daily  collagenase Ointment 1 Application(s) Topical daily  epoetin amee-epbx (RETACRIT) Injectable 65426 Unit(s) SubCutaneous <User Schedule>  fluconAZOLE   Tablet 200 milliGRAM(s) Oral daily  furosemide   Injectable 40 milliGRAM(s) IV Push two times a day  gabapentin 100 milliGRAM(s) Oral three times a day  hydroxychloroquine 200 milliGRAM(s) Oral daily  levETIRAcetam 500 milliGRAM(s) Oral daily  levETIRAcetam 250 milliGRAM(s) Oral <User Schedule>  metoprolol tartrate 50 milliGRAM(s) Oral two times a day  pantoprazole   Suspension 40 milliGRAM(s) Oral daily  sevelamer carbonate 800 milliGRAM(s) Oral three times a day with meals  vancomycin  IVPB 1000 milliGRAM(s) IV Intermittent once    MEDICATIONS  (PRN):  acetaminophen     Tablet .. 650 milliGRAM(s) Oral every 6 hours PRN Temp greater or equal to 38C (100.4F), Mild Pain (1 - 3)  ALBUTerol    90 MICROgram(s) HFA Inhaler 2 Puff(s) Inhalation every 6 hours PRN Bronchospasm  artificial tears (preservative free) Ophthalmic Solution 1 Drop(s) Both EYES two times a day PRN Dry Eyes  melatonin 3 milliGRAM(s) Oral at bedtime PRN Insomnia  ondansetron Injectable 4 milliGRAM(s) IV Push every 8 hours PRN Nausea and/or Vomiting  oxyCODONE    IR 5 milliGRAM(s) Oral every 4 hours PRN Moderate Pain (4 - 6)  senna 2 Tablet(s) Oral at bedtime PRN Constipation        Vital Signs Last 24 Hrs  T(C): 36.5 (2022 08:06), Max: 36.6 (2022 19:57)  T(F): 97.7 (2022 08:06), Max: 97.9 (2022 19:57)  HR: 90 (2022 08:06) (89 - 90)  BP: 146/99 (2022 08:06) (139/94 - 146/99)  BP(mean): --  RR: 18 (2022 08:06) (18 - 18)  SpO2: 99% (2022 08:06) (99% - 99%)Daily     Daily Weight in k.2 (2022 06:44)I&O's Summary      PHYSICAL EXAM:    Constitutional: NAD, awake and alert, well-developed  HEENT: PERR, EOMI,  No oral cyananosis.  Neck:  supple,  No JVD  Respiratory: Breath sounds are clear bilaterally, No wheezing, rales or rhonchi  Cardiovascular: S1 and S2, regular rate and rhythm, no Murmurs, gallops or rubs  Gastrointestinal: Bowel Sounds present, soft, nontender.   Extremities: No peripheral edema. No clubbing or cyanosis.  Vascular: 2+ peripheral pulses  Neurological: A/O x 3, no focal deficits  Musculoskeletal: no calf tenderness.  Skin: No rashes.      LABS: All Labs Reviewed:                        8.0    7.90  )-----------( 225      ( 2022 04:23 )             23.8     2022 04:23    128    |  93     |  19.2   ----------------------------<  93     3.8     |  21.0   |  4.00     Ca    7.9        2022 04:23  Mg     1.7       2022 04:23    TPro  5.6    /  Alb  2.6    /  TBili  0.3    /  DBili  x      /  AST  7      /  ALT  <5     /  AlkPhos  94     2022 04:23          Blood Culture: Organism --  Gram Stain Blood -- Gram Stain --  Specimen Source .Blood Blood-Peripheral  Culture-Blood --    Organism Blood Culture PCR  Gram Stain Blood -- Gram Stain   Growth in aerobic bottle: Yeast like cells  Specimen Source .Blood None  Culture-Blood --       @ 04:23  Pro Bnp >[id number]     @ 04:23  TSH: 7.57      RADIOLOGY/EKG:< from: 12 Lead ECG (22 @ 00:55) >  QTC Calculation(Bazett) 452 ms    P Axis 66 degrees    R Axis 48 degrees    T Axis 47 degrees    Diagnosis Line   Sinus tachycardia  Baseline artifact      Confirmed by KENNETH MORA ([id number]) on 2022 8:26:39 AM    < end of copied text >        ECHO/CARDIAC CATHTERIZATION/STRESS TEST:  
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 diff colitis 12/2021, bacteremia, fungemia,   stage 4 sacral decub since 2021, multiple episodes of seizures & syncope with HD, s/p hospitalization  12/15-12/18/21 for seizures (CSF negative), hospitalized at [facility] 1/18/22-2/2/22 after found unresponsive at home after being left alone for an hour, diagnosed with Aspiration PNA requiring intubation, LLL infiltrate s/p 7 day course of Vanco/Zosyn, discharged 2/2/22 and readmitted to [facility] 2/4/22 after LOC during HD. Hospital course complicated by MRSA bacteremia 2/4 (treated with IV Vanco), dialysis catheter tip thrombus noted with permacath removed and RIJ Shiley placed 2/11, C.abicans and C. tropicalis fungemia 2/11 (IV Caspofungin  transitioned to  Diflucan 2/16).   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 Transferred to [facility] for CT surgery evaluation on 2/18.   TTE at [facility] revealed LVEF 20-25%, Severely decreased global left ventricular systolic function, catheter seen in the right atrium with a highly mobile echogenicity visualized on aortic valve,  thrombus, an mass or thrombus seen in the inferior vena cava measuring 3.9 x 1.5 x 1.4 cm.   Pt was transferred back to  2/19 as no intervention was planned per CT surgery.   This AM - Cardiology Dr Palla spoke to Dr Raya  who confirmed this   In addition  patient requires further Heme/Onc evaluation as she underwent a CT   which revealed an abnormal breast density and extensive lymphadenopathy.     today    spoke to pt in detail w Dr Gu at bedside   see below       MEDICATIONS  (STANDING):  apixaban 2.5 milliGRAM(s) Oral two times a day  chlorhexidine 2% Cloths 1 Application(s) Topical daily  collagenase Ointment 1 Application(s) Topical daily  epoetin amee-epbx (RETACRIT) Injectable 60486 Unit(s) SubCutaneous <User Schedule>  gabapentin 100 milliGRAM(s) Oral three times a day  hydroxychloroquine 200 milliGRAM(s) Oral daily  levETIRAcetam 500 milliGRAM(s) Oral daily  levETIRAcetam 250 milliGRAM(s) Oral <User Schedule>  metoprolol tartrate 50 milliGRAM(s) Oral two times a day  pantoprazole   Suspension 40 milliGRAM(s) Oral daily  sevelamer carbonate 800 milliGRAM(s) Oral three times a day with meals        Intolerances      REVIEW OF SYSTEMS:    CONSTITUTIONAL: No weakness, fevers or chills  EYES/ENT: No visual changes;  No vertigo or throat pain   NECK: No pain or stiffness  RESPIRATORY: No cough, wheezing, hemoptysis; No shortness of breath  CARDIOVASCULAR: No chest pain or palpitations  GASTROINTESTINAL: No abdominal or epigastric pain. No nausea, vomiting, or hematemesis; No diarrhea or constipation. No melena or hematochezia.  GENITOURINARY: No dysuria, frequency or hematuria  NEUROLOGICAL: No numbness or weakness  SKIN: No itching, burning, rashes, or lesions   All other review of systems is negative unless indicated above.    Vital Signs Last 24 Hrs  T(C): 36.1 (24 Feb 2022 18:31), Max: 36.3 (23 Feb 2022 20:01)  T(F): 97 (24 Feb 2022 18:31), Max: 97.3 (23 Feb 2022 20:01)  HR: 66 (24 Feb 2022 18:31) (66 - 80)  BP: 94/67 (24 Feb 2022 18:31) (87/56 - 107/74)  BP(mean): --  RR: 18 (24 Feb 2022 18:31) (18 - 18)  SpO2: 95% (24 Feb 2022 18:31) (95% - 100%)    I&O's Detail    23 Feb 2022 07:01  -  24 Feb 2022 07:00  --------------------------------------------------------  IN:    Oral Fluid: 600 mL  Total IN: 600 mL    OUT:  Total OUT: 0 mL    Total NET: 600 mL      PHYSICAL EXAM:    Constitutional: frail, appears >> stated age  HEENT:  EOMI,  MM, poor dentition  Neck: No LAD, No JVD  Respiratory: dim  Cardiovascular: S1 and S2  Gastrointestinal: BS+, soft, NT/ND  Extremities: No peripheral edema  Neurological: A/O x 3, no focal deficits  : No Salgado  Skin: No rashes  Access: [facility]     LABS:               130    |  97     |  23     ----------------------------<  75        24 Feb 2022 07:03  4.6     |  23     |  5.13     129    |  98     |  14     ----------------------------<  83        23 Feb 2022 07:35  4.5     |  21     |  4.02     134    |  99     |  14     ----------------------------<  84        21 Feb 2022 11:29  3.7     |  27     |  4.05     Ca    7.8        24 Feb 2022 07:03  Ca    8.3        23 Feb 2022 07:35    Phos  3.4       21 Feb 2022 11:29    Mg     2.2       21 Feb 2022 11:29    TPro  6.0    /  Alb  1.7    /  TBili  0.3    /        21 Feb 2022 11:29  DBili  x      /  AST  7      /  ALT  <6     /  AlkPhos  80                   
PCP:    REQUESTING PHYSICIAN:    REASON FOR CONSULT:    CHIEF COMPLAINT:    HPI:  41 year old female former smoker (PPD, quit 2021) with a medical history of Lupus, RA, ANCA vasculitis with ESRD on HD (M/W/F), GERD, seizures, epilepsy, depression, COPD, HTN, substance abuse (marijuana, cocaine), IVDA (heroin), C. diff colitis 2021, bacteremia, fungemia, resp failure s/p Trach/PEG 20 (since reversed), stage 4 sacral decub since , multiple episodes of seizures & syncope with HD, s/p hospitalization 12/15-21 for seizures (CSF negative), hospitalized at [facility] 22-22 after found unresponsive at home after being left alone for an hour, diagnosed with Aspiration PNA requiring intubation, LLL infiltrate s/p 7 day course of Vanco/Zosyn, discharged 22 and readmitted to [facility] 22 after LOC during HD. Hospital course complicated by MRSA bacteremia  (treated with IV Vanco), dialysis catheter tip thrombus noted with permacath removed and RIJ Shiley placed , C.abicans and C. tropicalis fungemia  (IV Caspofungin X 4 days transitioned to IV Diflucan ). 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 Transferred to [facility] for CT surgery evaluation.  TTE at [facility] revealed LVEF 20-25%, Severely decreased global left ventricular systolic function, catheter seen in the right atrium with a highly mobile echogenicity visualized on aortic valve, thrombus, an mass or thrombus seen in the inferior vena cava measuring 3.9 x 1.5 x 1.4 cm. No intervention was planned per CT surgery. As per CTS the patient requires further Heme/Onc evaluation as she underwent a CT A/P at , which revealed an abnormal breast density and extensive lymphadenopathy. Additionally the patient will need eventual ischemic workup for decreased LVEF.    Patient  was started on eliquis during this admission for IVC thrombus ,  patient denies any chest pain or shortness of breath   22: Pt denies chest pain. HD anticipated.    22  Patient is worried , patient has fever 102,5   denies sob ro chest pin   dialysis catheter removed yesterday   22 No chest pain or shortness of breath. Afebrile    PAST MEDICAL & SURGICAL HISTORY:  Sepsis    HTN (hypertension)    COPD (chronic obstructive pulmonary disease)    Depression    Epilepsy    GERD (gastroesophageal reflux disease)    Bacteremia    Systemic lupus erythematosus    Aphonia    H/O tubal ligation    H/O appendicitis    Gall bladder stones    H/O tracheostomy    S/P percutaneous endoscopic gastrostomy (PEG) tube placement        SOCIAL HISTORY:    FAMILY HISTORY:  Family history of cardiomyopathy (Mother)        ALLERGIES:  Allergies    morphine (Rash)    Intolerances        MEDICATIONS:    MEDICATIONS  (STANDING):  apixaban 2.5 milliGRAM(s) Oral two times a day  chlorhexidine 2% Cloths 1 Application(s) Topical daily  collagenase Ointment 1 Application(s) Topical daily  epoetin amee-epbx (RETACRIT) Injectable 20389 Unit(s) SubCutaneous <User Schedule>  fluconAZOLE   Tablet 200 milliGRAM(s) Oral daily  furosemide   Injectable 60 milliGRAM(s) IV Push once  gabapentin 100 milliGRAM(s) Oral three times a day  hydroxychloroquine 200 milliGRAM(s) Oral daily  levETIRAcetam 500 milliGRAM(s) Oral daily  levETIRAcetam 250 milliGRAM(s) Oral <User Schedule>  metoprolol tartrate 50 milliGRAM(s) Oral two times a day  pantoprazole   Suspension 40 milliGRAM(s) Oral daily  sevelamer carbonate 800 milliGRAM(s) Oral three times a day with meals    MEDICATIONS  (PRN):  acetaminophen     Tablet .. 650 milliGRAM(s) Oral every 6 hours PRN Temp greater or equal to 38C (100.4F), Mild Pain (1 - 3)  ALBUTerol    90 MICROgram(s) HFA Inhaler 2 Puff(s) Inhalation every 6 hours PRN Bronchospasm  ALPRAZolam 0.5 milliGRAM(s) Oral every 6 hours PRN anxiety  artificial tears (preservative free) Ophthalmic Solution 1 Drop(s) Both EYES two times a day PRN Dry Eyes  melatonin 3 milliGRAM(s) Oral at bedtime PRN Insomnia  ondansetron Injectable 4 milliGRAM(s) IV Push every 8 hours PRN Nausea and/or Vomiting  oxyCODONE    IR 5 milliGRAM(s) Oral every 4 hours PRN Moderate Pain (4 - 6)  senna 2 Tablet(s) Oral at bedtime PRN Constipation        Vital Signs Last 24 Hrs  T(C): 36.3 (2022 09:41), Max: 37.2 (2022 20:42)  T(F): 97.3 (2022 09:41), Max: 99 (2022 20:42)  HR: 90 (2022 09:41) (90 - 101)  BP: 138/93 (2022 09:41) (124/86 - 138/93)  BP(mean): --  RR: 18 (2022 09:41) (18 - 18)  SpO2: 97% (2022 09:41) (97% - 100%)Daily     Daily Weight in k.8 (2022 05:48)I&O's Summary      PHYSICAL EXAM:    Constitutional: NAD, awake and alert, well-developed  HEENT: PERR, EOMI,  No oral cyananosis.  Neck:  supple,  No JVD  Respiratory: Breath sounds are clear bilaterally, No wheezing, rales or rhonchi  Cardiovascular: S1 and S2, regular rate and rhythm,2/6 ISELA  Gastrointestinal: Bowel Sounds present, soft, nontender.   Extremities: No peripheral edema. No clubbing or cyanosis.  Vascular: 1+ peripheral pulses  Neurological: A/O x 3, no focal deficits  Musculoskeletal: no calf tenderness.  Skin: No rashes.      LABS: All Labs Reviewed:                        10.6   6.28  )-----------( 241      ( 2022 07:35 )             34.3                         8.6    8.90  )-----------( 405      ( 2022 11:29 )             27.7     2022 07:35    129    |  98     |  14     ----------------------------<  83     4.5     |  21     |  4.02   2022 11:29    134    |  99     |  14     ----------------------------<  84     3.7     |  27     |  4.05     Ca    8.3        2022 07:35  Ca    7.9        2022 11:29  Phos  3.4       2022 11:29  Mg     2.2       2022 11:29    TPro  6.0    /  Alb  1.7    /  TBili  0.3    /  DBili  x      /  AST  7      /  ALT  <6     /  AlkPhos  80     2022 11:29          Blood Culture: Organism --  Gram Stain Blood -- Gram Stain --  Specimen Source .Blood Blood-Peripheral  Culture-Blood --            RADIOLOGY/EKG:      ECHO/CARDIAC CATHTERIZATION/STRESS TEST:  
  Date of service: 02-21-22 @ 15:10      Patient lying in bed; on dialysis, receiving vancomycin as well  Afebrile, tearful      ROS unable to obtain secondary to patient medical condition     MEDICATIONS  (STANDING):  apixaban 2.5 milliGRAM(s) Oral two times a day  chlorhexidine 2% Cloths 1 Application(s) Topical daily  collagenase Ointment 1 Application(s) Topical daily  epoetin amee-epbx (RETACRIT) Injectable 70636 Unit(s) SubCutaneous <User Schedule>  fluconAZOLE   Tablet 200 milliGRAM(s) Oral daily  furosemide   Injectable 40 milliGRAM(s) IV Push two times a day  gabapentin 100 milliGRAM(s) Oral three times a day  hydroxychloroquine 200 milliGRAM(s) Oral daily  levETIRAcetam 500 milliGRAM(s) Oral daily  levETIRAcetam 250 milliGRAM(s) Oral <User Schedule>  metoprolol tartrate 50 milliGRAM(s) Oral two times a day  pantoprazole   Suspension 40 milliGRAM(s) Oral daily  sevelamer carbonate 800 milliGRAM(s) Oral three times a day with meals    MEDICATIONS  (PRN):  acetaminophen     Tablet .. 650 milliGRAM(s) Oral every 6 hours PRN Temp greater or equal to 38C (100.4F), Mild Pain (1 - 3)  ALBUTerol    90 MICROgram(s) HFA Inhaler 2 Puff(s) Inhalation every 6 hours PRN Bronchospasm  artificial tears (preservative free) Ophthalmic Solution 1 Drop(s) Both EYES two times a day PRN Dry Eyes  melatonin 3 milliGRAM(s) Oral at bedtime PRN Insomnia  ondansetron Injectable 4 milliGRAM(s) IV Push every 8 hours PRN Nausea and/or Vomiting  oxyCODONE    IR 5 milliGRAM(s) Oral every 4 hours PRN Moderate Pain (4 - 6)  senna 2 Tablet(s) Oral at bedtime PRN Constipation      Vital Signs Last 24 Hrs  T(C): 37.7 (21 Feb 2022 14:49), Max: 37.7 (21 Feb 2022 14:49)  T(F): 99.8 (21 Feb 2022 14:49), Max: 99.8 (21 Feb 2022 14:49)  HR: 102 (21 Feb 2022 14:56) (73 - 102)  BP: 167/106 (21 Feb 2022 14:56) (117/70 - 170/97)  BP(mean): --  RR: 16 (21 Feb 2022 14:56) (14 - 18)  SpO2: 99% (21 Feb 2022 08:06) (99% - 99%)        Physical Exam:          Constitutional: frail looking  HEENT: NC/AT, EOMI, PERRLA, conjunctivae clear; ears and nose atraumatic; pharynx clear; poor dentition  Neck: supple; thyroid not palpable; right neck shiley  Back: no tenderness  Respiratory: respiratory effort normal; clear to auscultation  Cardiovascular: S1S2 regular, 2/6 systolic murmur  Abdomen: soft, not tender, not distended, positive BS; no liver or spleen organomegaly  Genitourinary: no suprapubic tenderness  Musculoskeletal: no muscle tenderness, no joint swelling or tenderness  Neurological/ Psychiatric:   moving all extremities  Skin: no rashes; no palpable lesions; extreme scarring of extremities    Labs: all available labs reviewed                    Labs:                        8.6    8.90  )-----------( 405      ( 21 Feb 2022 11:29 )             27.7     02-21    134<L>  |  99  |  14  ----------------------------<  84  3.7   |  27  |  4.05<H>    Ca    7.9<L>      21 Feb 2022 11:29  Phos  3.4     02-21  Mg     2.2     02-21    TPro  6.0  /  Alb  1.7<L>  /  TBili  0.3  /  DBili  x   /  AST  7<L>  /  ALT  <6<L>  /  AlkPhos  80  02-21           Cultures:       Culture - Blood (collected 02-19-22 @ 04:23)  Source: .Blood Blood-Peripheral  Preliminary Report (02-21-22 @ 05:00):    No growth at 48 hours    Culture - Blood (collected 02-17-22 @ 11:37)  Source: .Blood None  Gram Stain (02-19-22 @ 18:13):    Growth in aerobic and anaerobic bottles: Gram Positive Cocci in Clusters  Final Report (02-20-22 @ 19:11):    Growth in aerobic and anaerobic bottles: Staphylococcus epidermidis    Coag Negative Staphylococcus    Single set isolate, possible contaminant. Contact    Microbiology if susceptibility testing clinically    indicated.    ***Blood Panel PCR results on this specimen are available    approximately 3 hours after the Gram stain result.***    Gram stain, PCR, and/or culture results may not always    correspond due to difference in methodologies.    ************************************************************    This PCR assay was performed by multiplex PCR. This    Assay tests for 66 bacterial and resistance gene targets.    Please refer to the [facility] test directory    at [email or web address] for details.  Organism: Blood Culture PCR (02-20-22 @ 19:11)  Organism: Blood Culture PCR (02-20-22 @ 19:11)      -  Staphylococcus epidermidis, Methicillin resistant: Detec      Method Type: PCR    Culture - Blood (collected 02-17-22 @ 11:37)  Source: .Blood None  Gram Stain (02-20-22 @ 06:49):    Growth in aerobic bottle: Yeast like cells  Preliminary Report (02-20-22 @ 06:49):    Growth in aerobic bottle: Yeast like cells    Culture - Blood (collected 02-15-22 @ 08:07)  Source: .Blood None  Final Report (02-20-22 @ 12:00):    No Growth Final    Culture - Blood (collected 02-15-22 @ 08:03)  Source: .Blood None  Final Report (02-20-22 @ 12:00):    No Growth Final                                          Radiology: all available radiological tests reviewed    Advanced directives addressed: full resuscitation
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 diff colitis 12/2021, bacteremia, fungemia,   stage 4 sacral decub since 2021, multiple episodes of seizures & syncope with HD, s/p hospitalization  12/15-12/18/21 for seizures (CSF negative), hospitalized at [facility] 1/18/22-2/2/22 after found unresponsive at home after being left alone for an hour, diagnosed with Aspiration PNA requiring intubation, LLL infiltrate s/p 7 day course of Vanco/Zosyn, discharged 2/2/22 and readmitted to [facility] 2/4/22 after LOC during HD. Hospital course complicated by MRSA bacteremia 2/4 (treated with IV Vanco), dialysis catheter tip thrombus noted with permacath removed and RIJ Shiley placed 2/11, C.abicans and C. tropicalis fungemia 2/11 (IV Caspofungin  transitioned to  Diflucan 2/16).   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 Transferred to [facility] for CT surgery evaluation on 2/18.   TTE at [facility] revealed LVEF 20-25%, Severely decreased global left ventricular systolic function, catheter seen in the right atrium with a highly mobile echogenicity visualized on aortic valve,  thrombus, an mass or thrombus seen in the inferior vena cava measuring 3.9 x 1.5 x 1.4 cm.   Pt was transferred back to  2/19 as no intervention was planned per CT surgery.   This AM - Cardiology Dr Palla spoke to Dr Raya  who confirmed this   In addition  patient requires further Heme/Onc evaluation as she underwent a CT   which revealed an abnormal breast density and extensive lymphadenopathy.     today    no new complaints       MEDICATIONS  (STANDING):  MEDICATIONS  (STANDING):  apixaban 2.5 milliGRAM(s) Oral two times a day  chlorhexidine 2% Cloths 1 Application(s) Topical daily  collagenase Ointment 1 Application(s) Topical daily  epoetin amee-epbx (RETACRIT) Injectable 32578 Unit(s) SubCutaneous <User Schedule>  fluconAZOLE   Tablet 200 milliGRAM(s) Oral daily  gabapentin 100 milliGRAM(s) Oral three times a day  hydroxychloroquine 200 milliGRAM(s) Oral daily  levETIRAcetam 500 milliGRAM(s) Oral daily  levETIRAcetam 250 milliGRAM(s) Oral <User Schedule>  metoprolol tartrate 50 milliGRAM(s) Oral two times a day  pantoprazole   Suspension 40 milliGRAM(s) Oral daily  sevelamer carbonate 800 milliGRAM(s) Oral three times a day with meals        Intolerances      REVIEW OF SYSTEMS:    CONSTITUTIONAL: No weakness, fevers or chills  EYES/ENT: No visual changes;  No vertigo or throat pain   NECK: No pain or stiffness  RESPIRATORY: No cough, wheezing, hemoptysis; No shortness of breath  CARDIOVASCULAR: No chest pain or palpitations  GASTROINTESTINAL: No abdominal or epigastric pain. No nausea, vomiting, or hematemesis; No diarrhea or constipation. No melena or hematochezia.  GENITOURINARY: No dysuria, frequency or hematuria  NEUROLOGICAL: No numbness or weakness  SKIN: No itching, burning, rashes, or lesions   All other review of systems is negative unless indicated above.    Vital Signs Last 24 Hrs  T(C): 37.2 (22 Feb 2022 20:42), Max: 39.2 (22 Feb 2022 03:02)  T(F): 99 (22 Feb 2022 20:42), Max: 102.5 (22 Feb 2022 03:02)  HR: 101 (22 Feb 2022 20:42) (77 - 107)  BP: 124/86 (22 Feb 2022 20:42) (124/86 - 147/97)  BP(mean): 110 (22 Feb 2022 03:02) (110 - 110)  RR: 18 (22 Feb 2022 20:42) (16 - 18)  SpO2: 100% (22 Feb 2022 20:42) (100% - 100%)          PHYSICAL EXAM:    Constitutional: frail, appears >> stated age  HEENT:  EOMI,  MM, poor dentition  Neck: No LAD, No JVD  Respiratory: dim  Cardiovascular: S1 and S2  Gastrointestinal: BS+, soft, NT/ND  Extremities: No peripheral edema  Neurological: A/O x 3, no focal deficits  : No Naomi  Skin: No rashes  Access: [facility]     LABS:                        129    |  98     |  14     ----------------------------<  83        23 Feb 2022 07:35  4.5     |  21     |  4.02     134    |  99     |  14     ----------------------------<  84        21 Feb 2022 11:29  3.7     |  27     |  4.05     Ca    8.3        23 Feb 2022 07:35  Ca    7.9        21 Feb 2022 11:29    Phos  3.4       21 Feb 2022 11:29    Mg     2.2       21 Feb 2022 11:29    TPro  6.0    /  Alb  1.7    /  TBili  0.3    /        21 Feb 2022 11:29  DBili  x      /  AST  7      /  ALT  <6     /  AlkPhos  80       S:                  
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 diff colitis 2021, bacteremia, fungemia,   stage 4 sacral decub since , multiple episodes of seizures & syncope with HD, s/p hospitalization  12/15-21 for seizures (CSF negative), hospitalized at [facility] 22-22 after found unresponsive at home after being left alone for an hour, diagnosed with Aspiration PNA requiring intubation, LLL infiltrate s/p 7 day course of Vanco/Zosyn, discharged 22 and readmitted to [facility] 22 after LOC during HD. Hospital course complicated by MRSA bacteremia  (treated with IV Vanco), dialysis catheter tip thrombus noted with permacath removed and RIJ Shiley placed , C.abicans and C. tropicalis fungemia  (IV Caspofungin  transitioned to  Diflucan ).   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 Transferred to [facility] [place] surgery evaluation on .   TTE at [facility] revealed LVEF 20-25%, Severely decreased global left ventricular systolic function, catheter seen in the right atrium with a highly mobile echogenicity visualized on aortic valve,  thrombus, an mass or thrombus seen in the inferior vena cava measuring 3.9 x 1.5 x 1.4 cm.   Pt was transferred back to   as no intervention was planned per CT surgery.   This AM - Cardiology Dr Palla spoke to Dr Raya  who confirmed this   In addition  patient requires further Heme/Onc evaluation as she underwent a CT   which revealed an abnormal breast density and extensive lymphadenopathy.     today    no new complaints       MEDICATIONS  (STANDING):  apixaban 2.5 milliGRAM(s) Oral two times a day  chlorhexidine 2% Cloths 1 Application(s) Topical daily  collagenase Ointment 1 Application(s) Topical daily  epoetin amee-epbx (RETACRIT) Injectable 45962 Unit(s) SubCutaneous <User Schedule>  fluconAZOLE   Tablet 200 milliGRAM(s) Oral daily  furosemide   Injectable 40 milliGRAM(s) IV Push two times a day  gabapentin 100 milliGRAM(s) Oral three times a day  hydroxychloroquine 200 milliGRAM(s) Oral daily  levETIRAcetam 500 milliGRAM(s) Oral daily  levETIRAcetam 250 milliGRAM(s) Oral <User Schedule>  metoprolol tartrate 50 milliGRAM(s) Oral two times a day  pantoprazole   Suspension 40 milliGRAM(s) Oral daily  sevelamer carbonate 800 milliGRAM(s) Oral three times a day with meals      Intolerances      REVIEW OF SYSTEMS:    CONSTITUTIONAL: No weakness, fevers or chills  EYES/ENT: No visual changes;  No vertigo or throat pain   NECK: No pain or stiffness  RESPIRATORY: No cough, wheezing, hemoptysis; No shortness of breath  CARDIOVASCULAR: No chest pain or palpitations  GASTROINTESTINAL: No abdominal or epigastric pain. No nausea, vomiting, or hematemesis; No diarrhea or constipation. No melena or hematochezia.  GENITOURINARY: No dysuria, frequency or hematuria  NEUROLOGICAL: No numbness or weakness  SKIN: No itching, burning, rashes, or lesions   All other review of systems is negative unless indicated above.    Vital Signs Last 24 Hrs  T(C): 37.2 (2022 20:42), Max: 39.2 (2022 03:02)  T(F): 99 (2022 20:42), Max: 102.5 (2022 03:02)  HR: 101 (2022 20:42) (77 - 107)  BP: 124/86 (2022 20:42) (124/86 - 147/97)  BP(mean): 110 (2022 03:02) (110 - 110)  RR: 18 (2022 20:42) (16 - 18)  SpO2: 100% (2022 20:42) (100% - 100%)          PHYSICAL EXAM:    Constitutional: frail, appears >> stated age  HEENT:  EOMI,  MM, poor dentition  Neck: No LAD, No JVD  Respiratory: dim  Cardiovascular: S1 and S2  Gastrointestinal: BS+, soft, NT/ND  Extremities: No peripheral edema  Neurological: A/O x 3, no focal deficits  : No Salgado  Skin: No rashes  Access: [facility]     LABS:                          134    |  99     |  14     ----------------------------<  84        2022 11:29  3.7     |  27     |  4.05     128    |  93     |  19.2   ----------------------------<  93        2022 04:23  3.8     |  21.0   |  4.00     Ca    7.9        2022 11:29  Ca    7.9        2022 04:23    Phos  3.4       2022 11:29    Mg     2.2       2022 11:29  Mg     1.7       2022 04:23    TPro  6.0    /  Alb  1.7    /  TBili  0.3    /        2022 11:29  DBili  x      /  AST  7      /  ALT  <6     /  AlkPhos  80       TPro  5.6    /  Alb  2.6    /  TBili  0.3    /        2022 04:23  DBili  x      /  AST  7      /  ALT  <5     /  AlkPhos  94            Urine Studies:  Urinalysis Basic - ( 2022 23:01 )    Color: Yellow / Appearance: Slightly Turbid / S.010 / pH: x  Gluc: x / Ketone: Negative  / Bili: Negative / Urobili: Negative mg/dL   Blood: x / Protein: 30 mg/dL / Nitrite: Negative   Leuk Esterase: Trace / RBC: 0-2 /HPF / WBC 0-2 /HPF   Sq Epi: x / Non Sq Epi: Many / Bacteria: x      RADIOLOGY & ADDITIONAL STUDIES:                  
Chief Complaint: Loss of consciousness    Interval History: Yesterday late afternoon patient again with fever. No new complaints at that time. No acute events overnight. Reports that she feels generally okay but is having intermittent fevers again. On vancomycin IV post dialysis for MRSA bacteremia, Diflucan for C.albicans and C.tropicalis fungemia. Repeat blood cultures from 2/12 and 2/15 so far with no growth to date. After lengthy discussion with Nephrology and ID, given patient's past and current issues with vascular access, risk for stenosis / fibrosis to vessels with repeated line changes, difficulty getting labs without such access, decision made not to remove Shiley and instead draw blood cultures from Shiley after dialysis today. GRAHAM ordered to r/o subacute endocarditis. She is anemic today but no overt bleeding or hemolysis. Ordered for1u PRBC with dialysis. Lastly, she continues to receive wound care to her sacral decubitus wound, appears to be improving, no sign of infection there.    Transfered to [facility]. Evaluated and deemed not to be a good surgical candidate and as such no definitive treatment available for patients condition and palliative care evaluation recommended    SUBJECTIVE: Weak/Tired. Frustrated over present situation. Wants to go home.  Fever overnight x 1; Similar to prior episodes..     ROS: Multi system review is comprehensively negative x 10 systems except for chronic severe physical deconditioning, sacral decubitus wound    Physical Exam:  T(C): 36.3 (02-23-22 @ 20:01), Max: 39.4 (02-23-22 @ 16:00)  HR: 66 (02-24-22 @ 08:04) (66 - 80)  BP: 107/74 (02-24-22 @ 08:04) (87/56 - 107/74)  RR: 18 (02-24-22 @ 08:04) (18 - 18)  SpO2: 100% (02-24-22 @ 08:04) (97% - 100%)  GEN: No acute distress; Frail appearing  HEENT: NCAT PERRL EOMI  NECK: R IJ Shiley catheter in place, neck supple  CHEST: CTA b/l, no wheezes, rales or rhonchi  CVS: S1 S2 normal, tachycardic to 100  ABD: Soft, non tender, non distended, no rebound, no guarding  EXT: No calf tenderness, no erythema  SKIN: Warm, dry, stage IV sacral ulcer without locoregional tenderness, induration, fluctuance, no purulence  NEURO: Alert, awake, grossly without deficits    Labs:                             10.6   6.28  )-----------( 241      ( 23 Feb 2022 07:35 )             34.3     02-24    130<L>  |  97  |  23  ----------------------------<  75  4.6   |  23  |  5.13<H>    Ca    7.8<L>      24 Feb 2022 07:03      COVID-19 PCR: NotDetec (19 Feb 2022 12:40)  COVID-19 PCR: NotDetec (16 Feb 2022 14:25)  COVID-19 PCR: NotDetec (13 Feb 2022 14:15)  COVID-19 PCR: NotDetec (10 Feb 2022 17:10)  SARS-CoV-2: NotDetec (04 Feb 2022 14:06)  COVID-19 PCR: NotDetec (31 Jan 2022 12:32)  COVID-19 PCR: NotDetec (25 Jan 2022 22:00)  SARS-CoV-2: NotDetec (18 Jan 2022 14:06)  COVID-19 PCR: NotDetec (15 Dec 2021 13:48)  COVID-19 PCR: NotDetec (05 Dec 2021 05:11)  SARS-CoV-2: NotDetec (29 Nov 2021 23:48)  COVID-19 PCR: NotDetec (18 Nov 2021 13:00)  COVID-19 PCR: NotDetec (25 Oct 2021 16:44)  COVID-19 PCR: NotDetec (21 Oct 2021 20:30)  COVID-19 PCR: NotDetec (18 Oct 2021 05:50)  COVID-19 PCR: NotDetec (11 Oct 2021 22:25)    CAPILLARY BLOOD GLUCOSE                                8.6    8.90  )-----------( 405      ( 21 Feb 2022 11:29 )             27.7     02-21    134<L>  |  99  |  14  ----------------------------<  84  3.7   |  27  |  4.05<H>    Ca    7.9<L>      21 Feb 2022 11:29  Phos  3.4     02-21  Mg     2.2     02-21    TPro  6.0  /  Alb  1.7<L>  /  TBili  0.3  /  DBili  x   /  AST  7<L>  /  ALT  <6<L>  /  AlkPhos  80  02-21    COVID-19 PCR: NotDetec (19 Feb 2022 12:40)  COVID-19 PCR: NotDetec (16 Feb 2022 14:25)  COVID-19 PCR: NotDetec (13 Feb 2022 14:15)  COVID-19 PCR: NotDetec (10 Feb 2022 17:10)  SARS-CoV-2: NotDetec (04 Feb 2022 14:06)  COVID-19 PCR: NotDetec (31 Jan 2022 12:32)  COVID-19 PCR: NotDetec (25 Jan 2022 22:00)  SARS-CoV-2: NotDetec (18 Jan 2022 14:06)  COVID-19 PCR: NotDetec (15 Dec 2021 13:48)  COVID-19 PCR: NotDetec (05 Dec 2021 05:11)  SARS-CoV-2: NotDetec (29 Nov 2021 23:48)  COVID-19 PCR: NotDetec (18 Nov 2021 13:00)  COVID-19 PCR: NotDetec (25 Oct 2021 16:44)  COVID-19 PCR: NotDetec (21 Oct 2021 20:30)  COVID-19 PCR: NotDetec (18 Oct 2021 05:50)  COVID-19 PCR: NotDetec (11 Oct 2021 22:25)    CAPILLARY BLOOD GLUCOSE          Culture - Blood (collected 19 Feb 2022 04:23)  Source: .Blood Blood-Peripheral  Preliminary Report (21 Feb 2022 05:00):    No growth at 48 hours                                         8.0    7.90  )-----------( 225      ( 19 Feb 2022 04:23 )             23.8     02-19    128<L>  |  93<L>  |  19.2  ----------------------------<  93  3.8   |  21.0<L>  |  4.00<H>    Ca    7.9<L>      19 Feb 2022 04:23  Mg     1.7     02-19    TPro  5.6<L>  /  Alb  2.6<L>  /  TBili  0.3<L>  /  DBili  x   /  AST  7   /  ALT  <5  /  AlkPhos  94  02-19    COVID-19 PCR: NotDetec (19 Feb 2022 12:40)  COVID-19 PCR: NotDetec (16 Feb 2022 14:25)  COVID-19 PCR: NotDetec (13 Feb 2022 14:15)  COVID-19 PCR: NotDetec (10 Feb 2022 17:10)  SARS-CoV-2: NotDetec (04 Feb 2022 14:06)  COVID-19 PCR: NotDetec (31 Jan 2022 12:32)  COVID-19 PCR: NotDetec (25 Jan 2022 22:00)  SARS-CoV-2: NotDetec (18 Jan 2022 14:06)  COVID-19 PCR: NotDetec (15 Dec 2021 13:48)  COVID-19 PCR: NotDetec (05 Dec 2021 05:11)  SARS-CoV-2: NotDetec (29 Nov 2021 23:48)  COVID-19 PCR: NotDetec (18 Nov 2021 13:00)  COVID-19 PCR: NotDetec (25 Oct 2021 16:44)  COVID-19 PCR: NotDetec (21 Oct 2021 20:30)  COVID-19 PCR: NotDetec (18 Oct 2021 05:50)  COVID-19 PCR: NotDetec (11 Oct 2021 22:25)    CAPILLARY BLOOD GLUCOSE             CBC 2/17                        7.3    9.86  )--------( 269              22.6     BMP 2/17    130  |  97  |  19  ---------------------<  88  3.2   |  23  |  4.33    Ca 7.8  Phos 4.0    TPro  6.1  /  Alb  1.8  /  TBili  0.3  /  DBili  x   /  AST  7  /  ALT  8  /  AlkPhos  76      Lactate 6.1 --> 0.9    Micro:  COVID19 PCR 2/15: Negative  Blood culture 2/15: No growth to date  COVID19 PCR 2/13: Negative  Cdiff PCR 2/12: Negative  Blood culture 2/12: No growth to date  Blood culture 2/11: C.albicans, C.tropicalis  COVID19 PCR 2/10: Negative  Blood culture 2/8: Negative  Blood culture 2/6: Negative  Blood culture 2/4: MRSA    Imaging:  CT chest 2/12: Near complete resolution of previously identified mucoid debris in bronchus intermedius and bilateral lower lobe bronchi with tree-in-bud nodularity, right greater than left. No consolidation or pleural effusion. Asymmetric right breast density with nonspecific soft tissue infiltration extending to right chest wall.    CT abd/pelvis 2/12: Rectal tube identified, tip at the level of the rectosigmoid junction containing contrast. Mild wall thickening of the rectosigmoid junction. No bowel obstruction. Reidentified bulky retroperitoneal lymphadenopathy with cluster of multiple enlarged left paraaortic and pelvic lymph nodes, stable compared to recent prior study.    Cardiac Testing:  Tele 2/16-17: Sinus tachycardia,     EKG 2/12: Sinus tachycardia,     TTE 2/8 : The left ventricle is normal in size. Moderately reduced left ventricular systolic function. Estimated left ventricular ejection fraction is 40%. Normal appearing right atrium. A catheter wire is seen in the right atrium. Normal aortic valve structure and function. Trivial aortic regurgitation is present. Normal appearing mitral valve structure and function. Mild (1+) mitral regurgitation is present.        
Chief Complaint: Loss of consciousness    Interval History: Yesterday late afternoon patient again with fever. No new complaints at that time. No acute events overnight. Reports that she feels generally okay but is having intermittent fevers again. On vancomycin IV post dialysis for MRSA bacteremia, Diflucan for C.albicans and C.tropicalis fungemia. Repeat blood cultures from 2/12 and 2/15 so far with no growth to date. After lengthy discussion with Nephrology and ID, given patient's past and current issues with vascular access, risk for stenosis / fibrosis to vessels with repeated line changes, difficulty getting labs without such access, decision made not to remove Shiley and instead draw blood cultures from Shiley after dialysis today. GRAHAM ordered to r/o subacute endocarditis. She is anemic today but no overt bleeding or hemolysis. Ordered for1u PRBC with dialysis. Lastly, she continues to receive wound care to her sacral decubitus wound, appears to be improving, no sign of infection there.    Transfered to [facility]. Evaluated and deemed not to be a good surgical candidate and as such no definitive treatment available for patients condition and palliative care evaluation recommended    SUBJECTIVE: Weak/Tired. Frustrated over present situation. Wants to go home.      ROS: Multi system review is comprehensively negative x 10 systems except for chronic severe physical deconditioning, sacral decubitus wound    Physical Exam:  T(C): 37.7 (02-21-22 @ 14:49), Max: 37.7 (02-21-22 @ 14:49)  HR: 96 (02-21-22 @ 14:49) (73 - 99)  BP: 166/85 (02-21-22 @ 14:49) (117/70 - 170/97)  RR: 17 (02-21-22 @ 14:49) (14 - 18)  SpO2: 99% (02-21-22 @ 08:06) (99% - 99%)    GEN: No acute distress; Frail appearing  HEENT: NCAT PERRL EOMI  NECK: R IJ Shiley catheter in place, neck supple  CHEST: CTA b/l, no wheezes, rales or rhonchi  CVS: S1 S2 normal, tachycardic to 100  ABD: Soft, non tender, non distended, no rebound, no guarding  EXT: No calf tenderness, no erythema  SKIN: Warm, dry, stage IV sacral ulcer without locoregional tenderness, induration, fluctuance, no purulence  NEURO: Alert, awake, grossly without deficits    Labs:                             8.6    8.90  )-----------( 405      ( 21 Feb 2022 11:29 )             27.7     02-21    134<L>  |  99  |  14  ----------------------------<  84  3.7   |  27  |  4.05<H>    Ca    7.9<L>      21 Feb 2022 11:29  Phos  3.4     02-21  Mg     2.2     02-21    TPro  6.0  /  Alb  1.7<L>  /  TBili  0.3  /  DBili  x   /  AST  7<L>  /  ALT  <6<L>  /  AlkPhos  80  02-21    COVID-19 PCR: NotDetec (19 Feb 2022 12:40)  COVID-19 PCR: NotDetec (16 Feb 2022 14:25)  COVID-19 PCR: NotDetec (13 Feb 2022 14:15)  COVID-19 PCR: NotDetec (10 Feb 2022 17:10)  SARS-CoV-2: NotDetec (04 Feb 2022 14:06)  COVID-19 PCR: NotDetec (31 Jan 2022 12:32)  COVID-19 PCR: NotDetec (25 Jan 2022 22:00)  SARS-CoV-2: NotDetec (18 Jan 2022 14:06)  COVID-19 PCR: NotDetec (15 Dec 2021 13:48)  COVID-19 PCR: NotDetec (05 Dec 2021 05:11)  SARS-CoV-2: NotDetec (29 Nov 2021 23:48)  COVID-19 PCR: NotDetec (18 Nov 2021 13:00)  COVID-19 PCR: NotDetec (25 Oct 2021 16:44)  COVID-19 PCR: NotDetec (21 Oct 2021 20:30)  COVID-19 PCR: NotDetec (18 Oct 2021 05:50)  COVID-19 PCR: NotDetec (11 Oct 2021 22:25)    CAPILLARY BLOOD GLUCOSE          Culture - Blood (collected 19 Feb 2022 04:23)  Source: .Blood Blood-Peripheral  Preliminary Report (21 Feb 2022 05:00):    No growth at 48 hours                                         8.0    7.90  )-----------( 225      ( 19 Feb 2022 04:23 )             23.8     02-19    128<L>  |  93<L>  |  19.2  ----------------------------<  93  3.8   |  21.0<L>  |  4.00<H>    Ca    7.9<L>      19 Feb 2022 04:23  Mg     1.7     02-19    TPro  5.6<L>  /  Alb  2.6<L>  /  TBili  0.3<L>  /  DBili  x   /  AST  7   /  ALT  <5  /  AlkPhos  94  02-19    COVID-19 PCR: NotDetec (19 Feb 2022 12:40)  COVID-19 PCR: NotDetec (16 Feb 2022 14:25)  COVID-19 PCR: NotDetec (13 Feb 2022 14:15)  COVID-19 PCR: NotDetec (10 Feb 2022 17:10)  SARS-CoV-2: NotDetec (04 Feb 2022 14:06)  COVID-19 PCR: NotDetec (31 Jan 2022 12:32)  COVID-19 PCR: NotDetec (25 Jan 2022 22:00)  SARS-CoV-2: NotDetec (18 Jan 2022 14:06)  COVID-19 PCR: NotDetec (15 Dec 2021 13:48)  COVID-19 PCR: NotDetec (05 Dec 2021 05:11)  SARS-CoV-2: NotDetec (29 Nov 2021 23:48)  COVID-19 PCR: NotDetec (18 Nov 2021 13:00)  COVID-19 PCR: NotDetec (25 Oct 2021 16:44)  COVID-19 PCR: NotDetec (21 Oct 2021 20:30)  COVID-19 PCR: NotDetec (18 Oct 2021 05:50)  COVID-19 PCR: NotDetec (11 Oct 2021 22:25)    CAPILLARY BLOOD GLUCOSE             CBC 2/17                        7.3    9.86  )--------( 269              22.6     BMP 2/17    130  |  97  |  19  ---------------------<  88  3.2   |  23  |  4.33    Ca 7.8  Phos 4.0    TPro  6.1  /  Alb  1.8  /  TBili  0.3  /  DBili  x   /  AST  7  /  ALT  8  /  AlkPhos  76      Lactate 6.1 --> 0.9    Micro:  COVID19 PCR 2/15: Negative  Blood culture 2/15: No growth to date  COVID19 PCR 2/13: Negative  Cdiff PCR 2/12: Negative  Blood culture 2/12: No growth to date  Blood culture 2/11: C.albicans, C.tropicalis  COVID19 PCR 2/10: Negative  Blood culture 2/8: Negative  Blood culture 2/6: Negative  Blood culture 2/4: MRSA    Imaging:  CT chest 2/12: Near complete resolution of previously identified mucoid debris in bronchus intermedius and bilateral lower lobe bronchi with tree-in-bud nodularity, right greater than left. No consolidation or pleural effusion. Asymmetric right breast density with nonspecific soft tissue infiltration extending to right chest wall.    CT abd/pelvis 2/12: Rectal tube identified, tip at the level of the rectosigmoid junction containing contrast. Mild wall thickening of the rectosigmoid junction. No bowel obstruction. Reidentified bulky retroperitoneal lymphadenopathy with cluster of multiple enlarged left paraaortic and pelvic lymph nodes, stable compared to recent prior study.    Cardiac Testing:  Tele 2/16-17: Sinus tachycardia,     EKG 2/12: Sinus tachycardia,     TTE 2/8 : The left ventricle is normal in size. Moderately reduced left ventricular systolic function. Estimated left ventricular ejection fraction is 40%. Normal appearing right atrium. A catheter wire is seen in the right atrium. Normal aortic valve structure and function. Trivial aortic regurgitation is present. Normal appearing mitral valve structure and function. Mild (1+) mitral regurgitation is present.        
Chief Complaint: Loss of consciousness    Interval History: Yesterday late afternoon patient again with fever. No new complaints at that time. No acute events overnight. Reports that she feels generally okay but is having intermittent fevers again. On vancomycin IV post dialysis for MRSA bacteremia, Diflucan for C.albicans and C.tropicalis fungemia. Repeat blood cultures from 2/12 and 2/15 so far with no growth to date. After lengthy discussion with Nephrology and ID, given patient's past and current issues with vascular access, risk for stenosis / fibrosis to vessels with repeated line changes, difficulty getting labs without such access, decision made not to remove Shiley and instead draw blood cultures from Shiley after dialysis today. GRAHMA ordered to r/o subacute endocarditis. She is anemic today but no overt bleeding or hemolysis. Ordered for1u PRBC with dialysis. Lastly, she continues to receive wound care to her sacral decubitus wound, appears to be improving, no sign of infection there.    Transfered to [facility]. Evaluated and deemed not to be a good surgical candidate and as such no definitive treatment available for patients condition and palliative care evaluation recommended    SUBJECTIVE: Weak/Tired. Frustrated over present situation. Wants to go home.     ROS: Multi system review is comprehensively negative x 10 systems except for chronic severe physical deconditioning, sacral decubitus wound    Physical Exam:  T(C): 36.8 (02-20-22 @ 09:00), Max: 36.8 (02-20-22 @ 09:00)  HR: 93 (02-20-22 @ 09:00) (93 - 96)  BP: 157/105 (02-20-22 @ 09:00) (125/84 - 157/105)  RR: 18 (02-20-22 @ 09:00) (18 - 18)  SpO2: 98% (02-20-22 @ 09:00) (98% - 99%)    GEN: No acute distress  HEENT: NCAT PERRL EOMI  NECK: R IJ Shiley catheter in place, neck supple  CHEST: CTA b/l, no wheezes, rales or rhonchi  CVS: S1 S2 normal, tachycardic to 100  ABD: Soft, non tender, non distended, no rebound, no guarding  EXT: No calf tenderness, no erythema  SKIN: Warm, dry, stage IV sacral ulcer without locoregional tenderness, induration, fluctuance, no purulence  NEURO: Alert, awake, grossly without deficits    Labs:                                          8.0    7.90  )-----------( 225      ( 19 Feb 2022 04:23 )             23.8     02-19    128<L>  |  93<L>  |  19.2  ----------------------------<  93  3.8   |  21.0<L>  |  4.00<H>    Ca    7.9<L>      19 Feb 2022 04:23  Mg     1.7     02-19    TPro  5.6<L>  /  Alb  2.6<L>  /  TBili  0.3<L>  /  DBili  x   /  AST  7   /  ALT  <5  /  AlkPhos  94  02-19    COVID-19 PCR: NotDetec (19 Feb 2022 12:40)  COVID-19 PCR: NotDetec (16 Feb 2022 14:25)  COVID-19 PCR: NotDetec (13 Feb 2022 14:15)  COVID-19 PCR: NotDetec (10 Feb 2022 17:10)  SARS-CoV-2: NotDetec (04 Feb 2022 14:06)  COVID-19 PCR: NotDetec (31 Jan 2022 12:32)  COVID-19 PCR: NotDetec (25 Jan 2022 22:00)  SARS-CoV-2: NotDetec (18 Jan 2022 14:06)  COVID-19 PCR: NotDetec (15 Dec 2021 13:48)  COVID-19 PCR: NotDetec (05 Dec 2021 05:11)  SARS-CoV-2: NotDetec (29 Nov 2021 23:48)  COVID-19 PCR: NotDetec (18 Nov 2021 13:00)  COVID-19 PCR: NotDetec (25 Oct 2021 16:44)  COVID-19 PCR: NotDetec (21 Oct 2021 20:30)  COVID-19 PCR: NotDetec (18 Oct 2021 05:50)  COVID-19 PCR: NotDetec (11 Oct 2021 22:25)    CAPILLARY BLOOD GLUCOSE             CBC 2/17                        7.3    9.86  )--------( 269              22.6     BMP 2/17    130  |  97  |  19  ---------------------<  88  3.2   |  23  |  4.33    Ca 7.8  Phos 4.0    TPro  6.1  /  Alb  1.8  /  TBili  0.3  /  DBili  x   /  AST  7  /  ALT  8  /  AlkPhos  76      Lactate 6.1 --> 0.9    Micro:  COVID19 PCR 2/15: Negative  Blood culture 2/15: No growth to date  COVID19 PCR 2/13: Negative  Cdiff PCR 2/12: Negative  Blood culture 2/12: No growth to date  Blood culture 2/11: C.albicans, C.tropicalis  COVID19 PCR 2/10: Negative  Blood culture 2/8: Negative  Blood culture 2/6: Negative  Blood culture 2/4: MRSA    Imaging:  CT chest 2/12: Near complete resolution of previously identified mucoid debris in bronchus intermedius and bilateral lower lobe bronchi with tree-in-bud nodularity, right greater than left. No consolidation or pleural effusion. Asymmetric right breast density with nonspecific soft tissue infiltration extending to right chest wall.    CT abd/pelvis 2/12: Rectal tube identified, tip at the level of the rectosigmoid junction containing contrast. Mild wall thickening of the rectosigmoid junction. No bowel obstruction. Reidentified bulky retroperitoneal lymphadenopathy with cluster of multiple enlarged left paraaortic and pelvic lymph nodes, stable compared to recent prior study.    Cardiac Testing:  Tele 2/16-17: Sinus tachycardia,     EKG 2/12: Sinus tachycardia,     TTE 2/8 : The left ventricle is normal in size. Moderately reduced left ventricular systolic function. Estimated left ventricular ejection fraction is 40%. Normal appearing right atrium. A catheter wire is seen in the right atrium. Normal aortic valve structure and function. Trivial aortic regurgitation is present. Normal appearing mitral valve structure and function. Mild (1+) mitral regurgitation is present.      melatonin 3 milliGRAM(s) Oral at bedtime PRN Insomnia  oxycodone    5 mG/acetaminophen 325 mG 1 Tablet(s) Oral every 4 hours PRN Mild Pain (1 - 3)  senna 2 Tablet(s) Oral at bedtime PRN Constipation  sodium chloride 0.9% lock flush 10 milliLiter(s) IV Push every 1 hour PRN Pre/post blood products, medications, blood draw, and to maintain line patency  
Date of service: 02-24-22 @ 14:28    Patient will not have any more dialysis, will go home with hospice services; afebrile this am      ROS unable to obtain secondary to patient medical condition     MEDICATIONS  (STANDING):  apixaban 2.5 milliGRAM(s) Oral two times a day  chlorhexidine 2% Cloths 1 Application(s) Topical daily  collagenase Ointment 1 Application(s) Topical daily  epoetin amee-epbx (RETACRIT) Injectable 21518 Unit(s) SubCutaneous <User Schedule>  fluconAZOLE   Tablet 200 milliGRAM(s) Oral daily  gabapentin 100 milliGRAM(s) Oral three times a day  hydroxychloroquine 200 milliGRAM(s) Oral daily  levETIRAcetam 500 milliGRAM(s) Oral daily  levETIRAcetam 250 milliGRAM(s) Oral <User Schedule>  metoprolol tartrate 50 milliGRAM(s) Oral two times a day  pantoprazole   Suspension 40 milliGRAM(s) Oral daily  sevelamer carbonate 800 milliGRAM(s) Oral three times a day with meals    MEDICATIONS  (PRN):  acetaminophen     Tablet .. 650 milliGRAM(s) Oral every 6 hours PRN Temp greater or equal to 38C (100.4F), Mild Pain (1 - 3)  ALBUTerol    90 MICROgram(s) HFA Inhaler 2 Puff(s) Inhalation every 6 hours PRN Bronchospasm  ALPRAZolam 0.5 milliGRAM(s) Oral every 6 hours PRN anxiety  artificial tears (preservative free) Ophthalmic Solution 1 Drop(s) Both EYES two times a day PRN Dry Eyes  melatonin 3 milliGRAM(s) Oral at bedtime PRN Insomnia  ondansetron Injectable 4 milliGRAM(s) IV Push every 8 hours PRN Nausea and/or Vomiting  oxyCODONE    IR 5 milliGRAM(s) Oral every 4 hours PRN Moderate Pain (4 - 6)  senna 2 Tablet(s) Oral at bedtime PRN Constipation      Vital Signs Last 24 Hrs  T(C): 36.3 (23 Feb 2022 20:01), Max: 39.4 (23 Feb 2022 16:00)  T(F): 97.3 (23 Feb 2022 20:01), Max: 102.9 (23 Feb 2022 16:00)  HR: 66 (24 Feb 2022 08:04) (66 - 80)  BP: 107/74 (24 Feb 2022 08:04) (87/56 - 107/74)  BP(mean): --  RR: 18 (24 Feb 2022 08:04) (18 - 18)  SpO2: 100% (24 Feb 2022 08:04) (97% - 100%)        Physical Exam:      Constitutional: frail looking  HEENT: NC/AT, EOMI, PERRLA, conjunctivae clear; ears and nose atraumatic; pharynx clear; poor dentition  Neck: supple; thyroid not palpable; right neck shiley  Back: no tenderness  Respiratory: respiratory effort normal; clear to auscultation  Cardiovascular: S1S2 regular, 2/6 systolic murmur  Abdomen: soft, not tender, not distended, positive BS; no liver or spleen organomegaly  Genitourinary: no suprapubic tenderness  Musculoskeletal: no muscle tenderness, no joint swelling or tenderness  Neurological/ Psychiatric:   moving all extremities  Skin: no rashes; no palpable lesions; extreme scarring of extremities    Labs: all available labs reviewed                    Labs:                     Labs:                        10.6   6.28  )-----------( 241      ( 23 Feb 2022 07:35 )             34.3     02-24    130<L>  |  97  |  23  ----------------------------<  75  4.6   |  23  |  5.13<H>    Ca    7.8<L>      24 Feb 2022 07:03         Culture - Blood (02.17.22 @ 11:37)    Gram Stain:   Growth in aerobic bottle: Yeast like cells    Specimen Source: .Blood None    Culture Results:   Growth in aerobic bottle: Yeast like cells Resembling Rhodotorula species  Sent to [place] [facility]  [address] [place] [id number] for confirmation. and  susceptibility        Cultures:       Culture - Blood (collected 02-19-22 @ 04:23)  Source: .Blood Blood-Peripheral  Final Report (02-24-22 @ 05:01):  Culture - Blood (02.17.22 @ 11:37)    -  Staphylococcus epidermidis, Methicillin resistant: Detec    Gram Stain:   Growth in aerobic and anaerobic bottles: Gram Positive Cocci in Clusters    Specimen Source: .Blood None    Organism: Blood Culture PCR    Culture Results:   Growth in aerobic and anaerobic bottles: Staphylococcus epidermidis  Coag Negative Staphylococcus  Single set isolate, possible contaminant. Contact  Microbiology if susceptibility testing clinically  indicated.  ***Blood Panel PCR results on this specimen are available  approximately 3 hours after the Gram stain result.***  Gram stain, PCR, and/or culture results may not always  correspond due to difference in methodologies.  ************************************************************  This PCR assay was performed by multiplex PCR. This  Assay tests for 66 bacterial and resistance gene targets.  Please refer to the [facility] test directory  at [email or web address] for details.    Organism Identification: Blood Culture PCR    Method Type: PCR      No growth at 5 days.                                        Radiology: all available radiological tests reviewed    Advanced directives addressed: full resuscitation
Chief Complaint: Loss of consciousness    Interval History: Yesterday late afternoon patient again with fever. No new complaints at that time. No acute events overnight. Reports that she feels generally okay but is having intermittent fevers again. On vancomycin IV post dialysis for MRSA bacteremia, Diflucan for C.albicans and C.tropicalis fungemia. Repeat blood cultures from 2/12 and 2/15 so far with no growth to date. After lengthy discussion with Nephrology and ID, given patient's past and current issues with vascular access, risk for stenosis / fibrosis to vessels with repeated line changes, difficulty getting labs without such access, decision made not to remove Shiley and instead draw blood cultures from Shiley after dialysis today. GRAHAM ordered to r/o subacute endocarditis. She is anemic today but no overt bleeding or hemolysis. Ordered for1u PRBC with dialysis. Lastly, she continues to receive wound care to her sacral decubitus wound, appears to be improving, no sign of infection there.    Transfered to [facility]. Evaluated and deemed not to be a good surgical candidate and as such no definitive treatment available for patients condition and palliative care evaluation recommended    SUBJECTIVE: Weak/Tired. Frustrated over present situation. Wants to go home.  Fever overnight x 1; Similar to prior episodes..     ROS: Multi system review is comprehensively negative x 10 systems except for chronic severe physical deconditioning, sacral decubitus wound    Physical Exam:  T(C): 36.6 (02-22-22 @ 09:40), Max: 39.2 (02-22-22 @ 03:02)  HR: 77 (02-22-22 @ 09:40) (77 - 107)  BP: 142/94 (02-22-22 @ 09:40) (130/87 - 150/105)  RR: 18 (02-22-22 @ 09:40) (16 - 18)  SpO2: 100% (02-22-22 @ 09:40) (100% - 100%)    GEN: No acute distress; Frail appearing  HEENT: NCAT PERRL EOMI  NECK: R IJ Shiley catheter in place, neck supple  CHEST: CTA b/l, no wheezes, rales or rhonchi  CVS: S1 S2 normal, tachycardic to 100  ABD: Soft, non tender, non distended, no rebound, no guarding  EXT: No calf tenderness, no erythema  SKIN: Warm, dry, stage IV sacral ulcer without locoregional tenderness, induration, fluctuance, no purulence  NEURO: Alert, awake, grossly without deficits    Labs:                             8.6    8.90  )-----------( 405      ( 21 Feb 2022 11:29 )             27.7     02-21    134<L>  |  99  |  14  ----------------------------<  84  3.7   |  27  |  4.05<H>    Ca    7.9<L>      21 Feb 2022 11:29  Phos  3.4     02-21  Mg     2.2     02-21    TPro  6.0  /  Alb  1.7<L>  /  TBili  0.3  /  DBili  x   /  AST  7<L>  /  ALT  <6<L>  /  AlkPhos  80  02-21    COVID-19 PCR: NotDetec (19 Feb 2022 12:40)  COVID-19 PCR: NotDetec (16 Feb 2022 14:25)  COVID-19 PCR: NotDetec (13 Feb 2022 14:15)  COVID-19 PCR: NotDetec (10 Feb 2022 17:10)  SARS-CoV-2: NotDetec (04 Feb 2022 14:06)  COVID-19 PCR: NotDetec (31 Jan 2022 12:32)  COVID-19 PCR: NotDetec (25 Jan 2022 22:00)  SARS-CoV-2: NotDetec (18 Jan 2022 14:06)  COVID-19 PCR: NotDetec (15 Dec 2021 13:48)  COVID-19 PCR: NotDetec (05 Dec 2021 05:11)  SARS-CoV-2: NotDetec (29 Nov 2021 23:48)  COVID-19 PCR: NotDetec (18 Nov 2021 13:00)  COVID-19 PCR: NotDetec (25 Oct 2021 16:44)  COVID-19 PCR: NotDetec (21 Oct 2021 20:30)  COVID-19 PCR: NotDetec (18 Oct 2021 05:50)  COVID-19 PCR: NotDetec (11 Oct 2021 22:25)    CAPILLARY BLOOD GLUCOSE          Culture - Blood (collected 19 Feb 2022 04:23)  Source: .Blood Blood-Peripheral  Preliminary Report (21 Feb 2022 05:00):    No growth at 48 hours                                         8.0    7.90  )-----------( 225      ( 19 Feb 2022 04:23 )             23.8     02-19    128<L>  |  93<L>  |  19.2  ----------------------------<  93  3.8   |  21.0<L>  |  4.00<H>    Ca    7.9<L>      19 Feb 2022 04:23  Mg     1.7     02-19    TPro  5.6<L>  /  Alb  2.6<L>  /  TBili  0.3<L>  /  DBili  x   /  AST  7   /  ALT  <5  /  AlkPhos  94  02-19    COVID-19 PCR: NotDetec (19 Feb 2022 12:40)  COVID-19 PCR: NotDetec (16 Feb 2022 14:25)  COVID-19 PCR: NotDetec (13 Feb 2022 14:15)  COVID-19 PCR: NotDetec (10 Feb 2022 17:10)  SARS-CoV-2: NotDetec (04 Feb 2022 14:06)  COVID-19 PCR: NotDetec (31 Jan 2022 12:32)  COVID-19 PCR: NotDetec (25 Jan 2022 22:00)  SARS-CoV-2: NotDetec (18 Jan 2022 14:06)  COVID-19 PCR: NotDetec (15 Dec 2021 13:48)  COVID-19 PCR: NotDetec (05 Dec 2021 05:11)  SARS-CoV-2: NotDetec (29 Nov 2021 23:48)  COVID-19 PCR: NotDetec (18 Nov 2021 13:00)  COVID-19 PCR: NotDetec (25 Oct 2021 16:44)  COVID-19 PCR: NotDetec (21 Oct 2021 20:30)  COVID-19 PCR: NotDetec (18 Oct 2021 05:50)  COVID-19 PCR: NotDetec (11 Oct 2021 22:25)    CAPILLARY BLOOD GLUCOSE             CBC 2/17                        7.3    9.86  )--------( 269              22.6     BMP 2/17    130  |  97  |  19  ---------------------<  88  3.2   |  23  |  4.33    Ca 7.8  Phos 4.0    TPro  6.1  /  Alb  1.8  /  TBili  0.3  /  DBili  x   /  AST  7  /  ALT  8  /  AlkPhos  76      Lactate 6.1 --> 0.9    Micro:  COVID19 PCR 2/15: Negative  Blood culture 2/15: No growth to date  COVID19 PCR 2/13: Negative  Cdiff PCR 2/12: Negative  Blood culture 2/12: No growth to date  Blood culture 2/11: C.albicans, C.tropicalis  COVID19 PCR 2/10: Negative  Blood culture 2/8: Negative  Blood culture 2/6: Negative  Blood culture 2/4: MRSA    Imaging:  CT chest 2/12: Near complete resolution of previously identified mucoid debris in bronchus intermedius and bilateral lower lobe bronchi with tree-in-bud nodularity, right greater than left. No consolidation or pleural effusion. Asymmetric right breast density with nonspecific soft tissue infiltration extending to right chest wall.    CT abd/pelvis 2/12: Rectal tube identified, tip at the level of the rectosigmoid junction containing contrast. Mild wall thickening of the rectosigmoid junction. No bowel obstruction. Reidentified bulky retroperitoneal lymphadenopathy with cluster of multiple enlarged left paraaortic and pelvic lymph nodes, stable compared to recent prior study.    Cardiac Testing:  Tele 2/16-17: Sinus tachycardia,     EKG 2/12: Sinus tachycardia,     TTE 2/8 : The left ventricle is normal in size. Moderately reduced left ventricular systolic function. Estimated left ventricular ejection fraction is 40%. Normal appearing right atrium. A catheter wire is seen in the right atrium. Normal aortic valve structure and function. Trivial aortic regurgitation is present. Normal appearing mitral valve structure and function. Mild (1+) mitral regurgitation is present.        
  HPI:  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 (M/W/F), GERD, seizures, epilepsy, depression, COPD, HTN, substance abuse (marijuana, cocaine), IVDA (heroin), C. 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 Hospital course complicated by MRSA bacteremia 2/4 (treated with IV Vanco), dialysis catheter tip thrombus noted with permacath removed and COREY Reddy placed 2/11, C.abicans and C. tropicalis fungemia 2/11 (IV Caspofungin X 4 days transitioned to IV Diflucan 2/16). 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 Transferred to [facility] for CT surgery evaluation.  TTE at [facility] revealed LVEF 20-25%, Severely decreased global left ventricular systolic function, catheter seen in the right atrium with a highly mobile echogenicity visualized on aortic valve, thrombus, an mass or thrombus seen in the inferior vena cava measuring 3.9 x 1.5 x 1.4 cm. No intervention was planned per CT surgery. As per CTS the patient requires further Heme/Onc evaluation as she underwent a CT A/P at , which revealed an abnormal breast density and extensive lymphadenopathy. Additionally the patient will need eventual ischemic workup for decreased LVEF.    Patient  was started on eliquis during this admission for IVC thrombus ,  patient denies any chest pain or shortness of breath   2/21/22: Pt denies chest pain. HD anticipated.    2/22/22  Patient is worried , patient has fever 102,5   denies sob ro chest pin   dialysis catheter removed yesterday     PAST MEDICAL & SURGICAL HISTORY:  Sepsis    HTN (hypertension)    COPD (chronic obstructive pulmonary disease)    Depression    Epilepsy    GERD (gastroesophageal reflux disease)    Bacteremia    Systemic lupus erythematosus    Aphonia    H/O tubal ligation    H/O appendicitis    Gall bladder stones    H/O tracheostomy    S/P percutaneous endoscopic gastrostomy (PEG) tube placement    MEDICATIONS  (STANDING):  apixaban 2.5 milliGRAM(s) Oral two times a day  chlorhexidine 2% Cloths 1 Application(s) Topical daily  collagenase Ointment 1 Application(s) Topical daily  epoetin amee-epbx (RETACRIT) Injectable 59310 Unit(s) SubCutaneous <User Schedule>  fluconAZOLE   Tablet 200 milliGRAM(s) Oral daily  furosemide   Injectable 40 milliGRAM(s) IV Push two times a day  gabapentin 100 milliGRAM(s) Oral three times a day  hydroxychloroquine 200 milliGRAM(s) Oral daily  levETIRAcetam 500 milliGRAM(s) Oral daily  levETIRAcetam 250 milliGRAM(s) Oral <User Schedule>  metoprolol tartrate 50 milliGRAM(s) Oral two times a day  pantoprazole   Suspension 40 milliGRAM(s) Oral daily  sevelamer carbonate 800 milliGRAM(s) Oral three times a day with meals    MEDICATIONS  (PRN):  acetaminophen     Tablet .. 650 milliGRAM(s) Oral every 6 hours PRN Temp greater or equal to 38C (100.4F), Mild Pain (1 - 3)  ALBUTerol    90 MICROgram(s) HFA Inhaler 2 Puff(s) Inhalation every 6 hours PRN Bronchospasm  artificial tears (preservative free) Ophthalmic Solution 1 Drop(s) Both EYES two times a day PRN Dry Eyes  melatonin 3 milliGRAM(s) Oral at bedtime PRN Insomnia  ondansetron Injectable 4 milliGRAM(s) IV Push every 8 hours PRN Nausea and/or Vomiting  oxyCODONE    IR 5 milliGRAM(s) Oral every 4 hours PRN Moderate Pain (4 - 6)  senna 2 Tablet(s) Oral at bedtime PRN Constipation      Vital Signs Last 24 Hrs  T(C): 36.6 (22 Feb 2022 09:40), Max: 39.2 (22 Feb 2022 03:02)  T(F): 97.8 (22 Feb 2022 09:40), Max: 102.5 (22 Feb 2022 03:02)  HR: 77 (22 Feb 2022 09:40) (73 - 107)  BP: 142/94 (22 Feb 2022 09:40) (117/70 - 170/97)  BP(mean): 110 (22 Feb 2022 03:02) (110 - 110)  RR: 18 (22 Feb 2022 09:40) (14 - 18)  SpO2: 100% (22 Feb 2022 09:40) (100% - 100%)    I&O's Summary    21 Feb 2022 07:01  -  22 Feb 2022 07:00  --------------------------------------------------------  IN: 750 mL / OUT: 2000 mL / NET: -1250 mL        PHYSICAL EXAM:    Constitutional: NAD, awake and alert, well-developed  HEENT: PERR, EOMI,  No oral cyananosis.  Neck:  supple,  No JVD  Respiratory: bilateral air entry   Cardiovascular: S1 and S2, regular rate and rhythm, no Murmurs,   Gastrointestinal: Bowel Sounds present, soft, nontender.   Extremities: No peripheral edema. No clubbing or cyanosis.  Vascular: 2+ peripheral pulses  Neurological: A/O x 3, no focal deficits  Musculoskeletal: no calf tenderness.  Skin: No rashes.      LABS: All Labs Reviewed:                              8.6    8.90  )-----------( 405      ( 21 Feb 2022 11:29 )             27.7     02-21    134<L>  |  99  |  14  ----------------------------<  84  3.7   |  27  |  4.05<H>    Ca    7.9<L>      21 Feb 2022 11:29  Phos  3.4     02-21  Mg     2.2     02-21    TPro  6.0  /  Alb  1.7<L>  /  TBili  0.3  /  DBili  x   /  AST  7<L>  /  ALT  <6<L>  /  AlkPhos  80  02-21        LIVER FUNCTIONS - ( 21 Feb 2022 11:29 )  Alb: 1.7 g/dL / Pro: 6.0 gm/dL / ALK PHOS: 80 U/L / ALT: <6 U/L / AST: 7 U/L / GGT: x                 Culture Results:   No growth at 48 hours (02-19-22 @ 04:23)              RADIOLOGY/EKG:< from: 12 Lead ECG (02.19.22 @ 00:55) >  QTC Calculation(Bazett) 452 ms    P Axis 66 degrees    R Axis 48 degrees    T Axis 47 degrees    Diagnosis Line   Sinus tachycardia  Baseline artifact      Confirmed by KENNETH MORA ([id number]) on 2/21/2022 8:26:39 AM    < end of copied text >          
  HPI: Pt is a 41y old Female former smoker (1/4 PPD, quit 12/2021) with a medical history of Lupus, RA, ANCA vasculitis with ESRD on HD (M/W/F), GERD, seizures, epilepsy, depression, COPD, HTN, substance abuse (marijuana, cocaine), IVDA (heroin), C. 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 Hospital course complicated by MRSA bacteremia 2/4 (treated with IV Vanco), dialysis catheter tip thrombus noted with permacath removed and RIJ Shiley placed 2/11, C.abicans and C. tropicalis fungemia 2/11 (IV Caspofungin X 4 days transitioned to IV Diflucan 2/16). 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 Transferred to [facility] for CT surgery evaluation. TTE at [facility] revealed LVEF 20-25%, Severely decreased global left ventricular systolic function, catheter seen in the right atrium with a highly mobile echogenicity visualized on aortic valve, thrombus, an mass or thrombus seen in the inferior vena cava measuring 3.9 x 1.5 x 1.4 cm. No intervention was planned per CT surgery. As per CTS the patient requires further Heme/Onc evaluation as she underwent a CT A/P at , which revealed an abnormal breast density and extensive lymphadenopathy. Additionally the patient will need eventual ischemic workup for decreased LVEF. Palliative Medicine Consult to further establish GOC asa we had seen the pt last adm  2/22/22 Seen and examined at bedside this am. Awake and alert. States she has chronic pain R/T arthritis. Denies dyspnea at rest.  2/23/22 Seen and examined  at bedside. States that she knows she has no option than to terminate HD and go home on hospice. Tearful and anxious    PAIN: (X )Yes   ( )No  Level: mod-severe  Location: generalized  Intensity:   ? /10  Quality: ache  Aggravating Factors: unknown  Alleviating Factors: opiates  Impact on ADLs: severe    DYSPNEA: (X ) Yes  ( ) No  Level: increased on exertion    PAST MEDICAL & SURGICAL HISTORY:  Sepsis  HTN (hypertension)  COPD (chronic obstructive pulmonary disease)  Depression  Epilepsy  GERD (gastroesophageal reflux disease)  Bacteremia  Systemic lupus erythematosus  Aphonia  H/O tubal ligation  H/O appendicitis  Gall bladder stones  H/O tracheostomy  S/P percutaneous endoscopic gastrostomy (PEG) tube placement        SOCIAL HX:  Lives with brother  Hx opiate tolerance ( )YES  ( )NO    Baseline ADLs  (Prior to Admission)  ( ) Independent   ( )Dependent    FAMILY HISTORY:  Family history of cardiomyopathy (Mother)        Review of Systems:    Anxiety-yes H/O  Depression-yes H/O  Physical Discomfort-generalized  Dyspnea-on exertion  Anorexia-yes  Fatigue-severe  Weakness-severe    All other systems reviewed and negative    PHYSICAL EXAM:  ICU Vital Signs Last 24 Hrs  T(C): 36.3 (23 Feb 2022 09:41), Max: 37.2 (22 Feb 2022 20:42)  T(F): 97.3 (23 Feb 2022 09:41), Max: 99 (22 Feb 2022 20:42)  HR: 90 (23 Feb 2022 09:41) (90 - 101)  BP: 138/93 (23 Feb 2022 09:41) (124/86 - 138/93)  RR: 18 (23 Feb 2022 09:41) (18 - 18)  SpO2: 97% (23 Feb 2022 09:41) (97% - 100%)      PPSV2: 40 %    General: Frail middle aged female in bed in NAD  Mental Status: A&O X3  HEENT: oral mucosa dry  Lungs: clear diminished edel  Cardiac: S1S2+  GI: abd soft NT ND + BS  : abd soft NT ND + BS  Ext: BLAKELY on bed  Neuro: no focal def      LABS:                              10.6   6.28  )-----------( 241      ( 23 Feb 2022 07:35 )             34.3                 02-23    129<L>  |  98  |  14  ----------------------------<  83  4.5   |  21<L>  |  4.02<H>    Ca    8.3<L>      23 Feb 2022 07:35  TPro  6.0  /  Alb  1.7<L>  /  TBili  0.3  /  DBili  x   /  AST  7<L>  /  ALT  <6<L>  /  AlkPhos  80  02-21  Albumin: Albumin, Serum: 1.7 g/dL (02-21 @ 11:29)    Allergies  morphine (Rash)  Intolerances    MEDICATIONS  (STANDING):  MEDICATIONS  (STANDING):  apixaban 2.5 milliGRAM(s) Oral two times a day  chlorhexidine 2% Cloths 1 Application(s) Topical daily  collagenase Ointment 1 Application(s) Topical daily  epoetin amee-epbx (RETACRIT) Injectable 97436 Unit(s) SubCutaneous <User Schedule>  fluconAZOLE   Tablet 200 milliGRAM(s) Oral daily  furosemide   Injectable 60 milliGRAM(s) IV Push once  gabapentin 100 milliGRAM(s) Oral three times a day  hydroxychloroquine 200 milliGRAM(s) Oral daily  levETIRAcetam 500 milliGRAM(s) Oral daily  levETIRAcetam 250 milliGRAM(s) Oral <User Schedule>  metoprolol tartrate 50 milliGRAM(s) Oral two times a day  pantoprazole   Suspension 40 milliGRAM(s) Oral daily  sevelamer carbonate 800 milliGRAM(s) Oral three times a day with meals    MEDICATIONS  (PRN):  acetaminophen     Tablet .. 650 milliGRAM(s) Oral every 6 hours PRN Temp greater or equal to 38C (100.4F), Mild Pain (1 - 3)  ALBUTerol    90 MICROgram(s) HFA Inhaler 2 Puff(s) Inhalation every 6 hours PRN Bronchospasm  ALPRAZolam 0.5 milliGRAM(s) Oral every 6 hours PRN anxiety  artificial tears (preservative free) Ophthalmic Solution 1 Drop(s) Both EYES two times a day PRN Dry Eyes  melatonin 3 milliGRAM(s) Oral at bedtime PRN Insomnia  ondansetron Injectable 4 milliGRAM(s) IV Push every 8 hours PRN Nausea and/or Vomiting  oxyCODONE    IR 5 milliGRAM(s) Oral every 4 hours PRN Moderate Pain (4 - 6)  senna 2 Tablet(s) Oral at bedtime PRN Constipation

## 2022-02-24 NOTE — PROGRESS NOTE ADULT - ASSESSMENT
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 diff colitis 12/2021, bacteremia, fungemia, resp failure s/p Trach/PEG 7/13/20 (since reversed), stage 4 sacral decub since 2021, multiple episodes of seizures & syncope with HD, s/p hospitalization 12/15-12/18/21 for seizures (CSF negative), hospitalized at [facility] 1/18/22-2/2/22 after found unresponsive at home after being left alone for an hour, diagnosed with Aspiration PNA requiring intubation, LLL infiltrate s/p 7 day course of Vanco/Zosyn, discharged 2/2/22 and readmitted to [facility] 2/4/22 after LOC during HD. Hospital course complicated by MRSA bacteremia 2/4 (treated with IV Vanco), dialysis catheter tip thrombus noted with permacath removed and RIJ Shiley placed 2/11, C.abicans and C. tropicalis fungemia 2/11 (IV Caspofungin X 4 days transitioned to IV Diflucan 2/16). 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 Transferred to [facility] for CT surgery evaluation.  TTE at [facility] revealed LVEF 20-25%, Severely decreased global left ventricular systolic function, catheter seen in the right atrium with a highly mobile echogenicity visualized on aortic valve, thrombus, an mass or thrombus seen in the inferior vena cava measuring 3.9 x 1.5 x 1.4 cm. No intervention was planned per CT surgery. As per CTS the patient requires further Heme/Onc evaluation as she underwent a CT A/P at , which revealed an abnormal breast density and extensive lymphadenopathy. Additionally the patient will need eventual ischemic workup for decreased LVEF; therefore she was readmitted back to  on 2/19. Blood cultures from 2/17 are still growing yeast, and Coag negative staph. The patient is frustrated with medical problems, has no idea what will happen to her.     1. Patient admitted with persistent fungemia, bacteremia in setting of endocarditis and infected lines for dialysis access; multiple other medical problems that complicate treating the patient  - patient is full hospice care  - will go home on no further dialysis  - will stop diflucan  - blood cultures noted; infection control aware and will follow up protocol for dialysis equipment  2. other issues: per medicine
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 diff colitis 12/2021, bacteremia, fungemia, resp failure s/p Trach/PEG 7/13/20 (since reversed), stage 4 sacral decub since 2021, multiple episodes of seizures & syncope with HD, s/p hospitalization 12/15-12/18/21 for seizures (CSF negative), hospitalized at [facility] 1/18/22-2/2/22 after found unresponsive at home after being left alone for an hour, diagnosed with Aspiration PNA requiring intubation, LLL infiltrate s/p 7 day course of Vanco/Zosyn, discharged 2/2/22 and readmitted to [facility] 2/4/22 after LOC during HD. Hospital course complicated by MRSA bacteremia 2/4 (treated with IV Vanco), dialysis catheter tip thrombus noted with permacath removed and RIJ Shiley placed 2/11, C.abicans and C. tropicalis fungemia 2/11 (IV Caspofungin X 4 days transitioned to IV Diflucan 2/16). 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 Transferred to [facility] for CT surgery evaluation.  TTE at [facility] revealed LVEF 20-25%, Severely decreased global left ventricular systolic function, catheter seen in the right atrium with a highly mobile echogenicity visualized on aortic valve, thrombus, an mass or thrombus seen in the inferior vena cava measuring 3.9 x 1.5 x 1.4 cm. No intervention was planned per CT surgery. As per CTS the patient requires further Heme/Onc evaluation as she underwent a CT A/P at , which revealed an abnormal breast density and extensive lymphadenopathy. Additionally the patient will need eventual ischemic workup for decreased LVEF; therefore she was readmitted back to  on 2/19. Blood cultures from 2/17 are still growing yeast, and Coag negative staph. The patient is frustrated with medical problems, has no idea what will happen to her.     1. Patient admitted with persistent fungemia, bacteremia in setting of endocarditis and infected lines for dialysis access; multiple other medical problems that complicate treating the patient  - will need the shiley catheter removed; after discussion with renal patient will have dialysis on 2/21, followed by vancomycin one gram times one  - will continue diflucan 200 mg po daily, diflucan bioavailability is same po as iv  - peripheral blood cultures from 2/20 are still growing yeast, including those from the shiley; any line placements will have to be in and out after use  - not a cardiothoracic surgery candidate; reviewed with cardiology   - patient understands this and next steps including removing the shiley; decisions about future dialysis are limited, patient wants to go home, unlikely that she can go home and have dialysis  - consideration for palliative care evaluation; or psychiatry for supportive care  - d/w hospitalist, nephrology  2. other issues: per medicine
MEDICATIONS  (STANDING):  apixaban 2.5 milliGRAM(s) Oral two times a day  chlorhexidine 2% Cloths 1 Application(s) Topical daily  collagenase Ointment 1 Application(s) Topical daily  epoetin amee-epbx (RETACRIT) Injectable 71790 Unit(s) SubCutaneous <User Schedule>  fluconAZOLE   Tablet 200 milliGRAM(s) Oral daily  furosemide   Injectable 40 milliGRAM(s) IV Push two times a day  gabapentin 100 milliGRAM(s) Oral three times a day  hydroxychloroquine 200 milliGRAM(s) Oral daily  levETIRAcetam 500 milliGRAM(s) Oral daily  levETIRAcetam 250 milliGRAM(s) Oral <User Schedule>  metoprolol tartrate 50 milliGRAM(s) Oral two times a day  pantoprazole   Suspension 40 milliGRAM(s) Oral daily  sevelamer carbonate 800 milliGRAM(s) Oral three times a day with meals    MEDICATIONS  (PRN):  acetaminophen     Tablet .. 650 milliGRAM(s) Oral every 6 hours PRN Temp greater or equal to 38C (100.4F), Mild Pain (1 - 3)  ALBUTerol    90 MICROgram(s) HFA Inhaler 2 Puff(s) Inhalation every 6 hours PRN Bronchospasm  artificial tears (preservative free) Ophthalmic Solution 1 Drop(s) Both EYES two times a day PRN Dry Eyes  melatonin 3 milliGRAM(s) Oral at bedtime PRN Insomnia  ondansetron Injectable 4 milliGRAM(s) IV Push every 8 hours PRN Nausea and/or Vomiting  oxyCODONE    IR 5 milliGRAM(s) Oral every 4 hours PRN Moderate Pain (4 - 6)  senna 2 Tablet(s) Oral at bedtime PRN Constipation      41 year-old woman with ESRD on HD, rheumatoid arthritis, Raynaud's, tobacco and IV drug use disorder, admitted for episode of loss of consciousness, sepsis with organ dysfunction, and dialysis catheter tip thrombus, found to have MRSA bacteremia, since found to also have C.abicans and C.tropicalis on blood cultures taken when she was noted to have persisting fever despite removal of permcath and several doses of IV vancomycin. Caspofungin added and patient now clinically improving.     Sepsis with organ dysfunction (elevated lactate) due to MRSA bacteremia, present upon admission, unable to determine if PC source of her IV drug use disorder as source, now C.albicans and C.tropicalis in blood culture as well   In setting of IV drug use disorder. MRSA on blood culture 2/4. Dialysis catheter removed 2/7. R IJ Shiley since placed. Has been on IV vancomycin post dialysis but patient was noted to have persisting high fevers, found to have C.albicans and C.tropicalis on blood culture 2/11. TTE negative for vegetations. ID following. On Vancomycin with HD. Started on IV caspofungin 2/12, switched to PO Diflucan 2/16. Repeat cultures 2/12 and 2/15 no growth to date. However, patient still with intermittent fever. Recognize that Shiley needs to be removed.   After lengthy discussion with Nephrology and ID, given patient's past and current issues with vascular access, risk for stenosis / fibrosis to vessels with repeated line changes, difficulty getting labs without such access, decision made not to remove Shiley and instead draw blood cultures from Shiley after dialysis today.   - GRAHAM with atrial septal vegetation and possible aortic valve vegetation  - Transfered [facility] for CTS evaluation. Patient not a surgical candidate  - Still with persistent bacteremia and candemia. Unable to clear. HD line removed 2/21 after dialysis. TO remain without line for several days and then repeat blood cultures.   - Continue ABX as per ID  - Monitor for fever, maintain MAP > 65    ESRD on HD  Dialysis catheter tip thrombus  Receiving dialysis sessions as per Nephrology, currently via temporary HD catheter as patients permcath was removed this admission on 2/7 in setting of sepsis, bacteremia. Will need more permanent HD access once clinically improved and bacteremia / fungemia cleared. Vascular Surgery following.   - Continue dialysis sessions  - Anticipate possible permcath placement when cleared   - Continue to save R arm (no BP cuffs, IVs or blood draws) and will plan for eventual AVF creation  Catheter since removed due to MRSA bacteremia/candedemia and sepsis. On Eliquis.  - Continue Eliquis     CKD anemia  May also be further compounded by septicemia and fungemia. S/P PRBC transfusion with HD 2/12. She is anemic again today but no overt bleeding or hemolysis.   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 No malodor. No purulence. No locoregional erythema or induration or fluctuance  - Continue wound care  - Turn frequently    Hx of seizure  Stable  - On Keppra    Rheumatoid arthritis  Stable  - Continue Plaquenil    Abnormal imaging of the breast  As noted on CT chest 2/12. Asymmetric right breast density with nonspecific soft tissue infiltration extending to right chest wall.   - Patient will need non-emergent breast imaging correlation to exclude underlying malignancy including inflammatory carcinoma.    Retroperitoneal lymphadenopathy with a cluster of multiple enlarged left paraaortic and pelvic lymph nodes  As noted on CT abd/pelvis 2/12 and imaging prior. Rather stable in size.   - Patient will need continued follow up regarding these findings    Severe protein calorie malnutrition  Appreciate input from Nutrition  - Encourage po intake, trend weight      RESOLVED HOSPITAL PROBLEMS    Loose stools  Resolved. No hematochezia or melena. No abdominal tenderness. FOBT negative. Cdiff PCR negative. CT with possible mild wall thickening at rectosigmoid junction. Resolved without intervention.         Diet: Renal  DVT px: Eliquis,   Code Status: Full  Dispo: HD 2/21. Removing catheter aftewards. Repeat blood culture in 2-3 days. Titrate Rx accordingly. Patient wants to go home, known her prognosis is poor. Palliative care consulted. Patient wants to continue HD but each catheter insertion will be source for persistent bacteremia and candedemia to be contaminate line leading to more persistent infeciton. Unable to clear patient to go home with persistent blood stream infection. Plan is to attempt to clear blood infection. Line removed. Repeat blood cultures in several days. And will require line re-insertion in several days to undergo HD again.     
42 yo female with complicated medical hx and hospital course ( see above)    IVDA hx --> ANCA vascualits --> ESRD on HD, w MRSA bacteremia /and fungemia ( candida) w persistent fevers depsite removal of permacath and exchange of 2 shiley catheters. + atrial septal vegetation and possible aortic vegetation. Transferred to [facility] and deemed not candidate for surgery due to coomorbidities, IVDA and breast mass.  Transferred back to  next day for medical /nonsurgical care and palliaive care eval    ESRD on HD w complicated infectious course.   still with + bcx on 2/17 MRSA and + Yeast cells on 2/17      for extended line holiday 5- 7 days Source control of bacteremia /fungemia needs to be achieved if permacath to be placed but this is doubtful without surgical removal of vegetations     ID : Endocarditis    IV abx and anitfungals    cardiology fup     Atrial thromus/IVC thrombus    A/C as per cardiology      Anemia   TAMARA SC while HD on hold     PRBC as needed      Breast Mass/Lyphadenopathy     onc evaluation / ? bx    Palliaitive care eval requested - poor prognosis overall     pt requesting to go home if she is to be hospice    advised pt if we cannot clear her blood cx  even if the HD cathtert was removed again then would need pallitaive are evaluation due to limited options to maintain clear.      would not place catheter line for one HD  if pt is planing hospice - risks of catheter placement >>> benefits of marginal extension of time w 1 extra HD session           
42 yo female with complicated medical hx and hospital course ( see above)    IVDA hx --> ANCA vascualits --> ESRD on HD, w MRSA bacteremia /and fungemia ( candida) w persistent fevers depsite removal of permacath and exchange of 2 shiley catheters. + atrial septal vegetation and possible aortic vegetation. Transferred to [facility] and deemed not candidate for surgery due to coomorbidities, IVDA and breast mass.  Transferred back to  next day for medical /nonsurgical care and palliaive care eval    ESRD on HD w complicated infectious course.   still with + bcx on 2/17 MRSA and + Yeast cells on 2/17    repeat cx tomorrow ( catheter remove 1 day ago)     for extended line holiday 5- 7 days if stable check labs in AM . May need  one day shiley which would be inserted and removed same day for HD . reasses w labs  every other day as pt is poor venous acess. . Poor venous access - may need A -stick for one time blood draws.  Source control of bacteremia /fungemia needs to be achieved if permacath to be placed but this is doubtful without surgical removal of vegetations     ID : Endocarditis    IV abx and anitfungals    cardiology fup     Atrial thromus/IVC thrombus    A/C as per cardiology      Anemia   TAMARA SC while HD on hold     PRBC as needed      Breast Mass/Lyphadenopathy     onc evaluation / ? bx    Palliaitive care eval requested - poor prognosis overall     pt requesting to go home if she is to be hospice    advised pt if we cannot clear her blood cx  even if the HD cathtert was removed again then would need pallitaive are evaluation due to limited options to maintain clear.    ** pt seen this am         Thank you for the courtesy of this consult. We will follow this patient with you.   Management is subject to change if new information becomes available or patient condition changes.            
42 yo female with complicated medical hx and hospital course ( see above)    IVDA hx --> ANCA vascualits --> ESRD on HD, w MRSA bacteremia /and fungemia ( candida) w persistent fevers depsite removal of permacath and exchange of 2 shiley catheters. + atrial septal vegetation and possible aortic vegetation. Transferred to [facility] and deemed not candidate for surgery due to coomorbidities, IVDA and breast mass.  Transferred back to  next day for medical /nonsurgical care and palliaive care eval    ESRD on HD w complicated infectious course.   still with + bcx on 2/17 MRSA and + Yeast cells on 2/17   HD today and then removal of Shiley - for extended line holiday 5- 7 days if stable. If not then may need one day shiley which would be inserted and removed same day for HD . reasses w labs daily if possible or every other day. Poor venous access - may need A -stick for one time blood draws.  Source control of bacteremia /fungemia needs to be achieved if permacath to be placed but this is doubtful without surgical removal of vegetations     ID : Endocarditis    IV abx and anitfungals    cardiology fup     Atrial thromus/IVC thrombus    A/C as per cardiology      Anemia   TAMARA SC while HD on hold     PRBC as needed      Breast Mass/Lyphadenopathy     onc evaluation / ? bx    Palliaitive care eval requested - poor prognosis overall     pt requesting to go home if she is to be hospice        d/w Halina Harrington/Riccardo and Palla this AM at length       Thank you for the courtesy of this consult. We will follow this patient with you.   Management is subject to change if new information becomes available or patient condition changes.            
42 yo female with complicated medical hx and hospital course ( see above)    IVDA hx --> ANCA vascualits --> ESRD on HD, w MRSA bacteremia /and fungemia ( candida) w persistent fevers depsite removal of permacath and exchange of 2 shiley catheters. + atrial septal vegetation and possible aortic vegetation. Transferred to [facility] and deemed not candidate for surgery due to coomorbidities, IVDA and breast mass.  Transferred back to  next day for medical /nonsurgical care and palliaive care eval    ESRD on HD w complicated infectious course as above    bcx on 2/17 MRSA and + Yeast cells on 2/17      As per discussions with ID and Cardiology - will be unable to achieve source control of bacteremia /fungemia due to vegetations, and so unable to place a permacath safely for longterm HD. Since not a surgical candidate palliative care was consulted.    ID : Endocarditis inoperable per cardiothoracic team at [facility]   IV abx and anitfungals    cardiology fup     DIscussed with pt in detail: pt understands poor prognosis w persistent fungemia/bacteremia w inoperable endocarditis. Pt is requesting home hospice with discontinuation of dialysis w lack of permanent access options.  Hospice planning as per Palliative /Medicine        
MEDICATIONS  (STANDING):  apixaban 2.5 milliGRAM(s) Oral two times a day  chlorhexidine 2% Cloths 1 Application(s) Topical daily  collagenase Ointment 1 Application(s) Topical daily  epoetin amee-epbx (RETACRIT) Injectable 36216 Unit(s) SubCutaneous <User Schedule>  fluconAZOLE   Tablet 200 milliGRAM(s) Oral daily  furosemide   Injectable 40 milliGRAM(s) IV Push two times a day  gabapentin 100 milliGRAM(s) Oral three times a day  hydroxychloroquine 200 milliGRAM(s) Oral daily  levETIRAcetam 500 milliGRAM(s) Oral daily  levETIRAcetam 250 milliGRAM(s) Oral <User Schedule>  metoprolol tartrate 50 milliGRAM(s) Oral two times a day  pantoprazole   Suspension 40 milliGRAM(s) Oral daily  sevelamer carbonate 800 milliGRAM(s) Oral three times a day with meals    MEDICATIONS  (PRN):  acetaminophen     Tablet .. 650 milliGRAM(s) Oral every 6 hours PRN Temp greater or equal to 38C (100.4F), Mild Pain (1 - 3)  ALBUTerol    90 MICROgram(s) HFA Inhaler 2 Puff(s) Inhalation every 6 hours PRN Bronchospasm  artificial tears (preservative free) Ophthalmic Solution 1 Drop(s) Both EYES two times a day PRN Dry Eyes  melatonin 3 milliGRAM(s) Oral at bedtime PRN Insomnia  ondansetron Injectable 4 milliGRAM(s) IV Push every 8 hours PRN Nausea and/or Vomiting  oxyCODONE    IR 5 milliGRAM(s) Oral every 4 hours PRN Moderate Pain (4 - 6)  senna 2 Tablet(s) Oral at bedtime PRN Constipation      41 year-old woman with ESRD on HD, rheumatoid arthritis, Raynaud's, tobacco and IV drug use disorder, admitted for episode of loss of consciousness, sepsis with organ dysfunction, and dialysis catheter tip thrombus, found to have MRSA bacteremia, since found to also have C.abicans and C.tropicalis on blood cultures taken when she was noted to have persisting fever despite removal of permcath and several doses of IV vancomycin. Caspofungin added and patient now clinically improving.     Sepsis with organ dysfunction (elevated lactate) due to MRSA bacteremia, present upon admission, unable to determine if PC source of her IV drug use disorder as source, now C.albicans and C.tropicalis in blood culture as well   In setting of IV drug use disorder. MRSA on blood culture 2/4. Dialysis catheter removed 2/7. R IJ Shiley since placed. Has been on IV vancomycin post dialysis but patient was noted to have persisting high fevers, found to have C.albicans and C.tropicalis on blood culture 2/11. TTE negative for vegetations. ID following. On Vancomycin with HD. Started on IV caspofungin 2/12, switched to PO Diflucan 2/16. Repeat cultures 2/12 and 2/15 no growth to date. However, patient still with intermittent fever. Recognize that Shiley needs to be removed.   After lengthy discussion with Nephrology and ID, given patient's past and current issues with vascular access, risk for stenosis / fibrosis to vessels with repeated line changes, difficulty getting labs without such access, decision made not to remove Shiley and instead draw blood cultures from Shiley after dialysis today.   - GRAHAM ordered to r/o subacute endocarditis.   - F/u Bld Cxs from [facility] 2/17  - Continue IV vancomycin post dialysis, eventually planned to complete 3/5  - Continue PO Diflucan 200mg daily until 2/26  - Monitor for fever, maintain MAP > 65  - Opthalmology eval in process of being requested, awaiting call back from specialist    Sinus tachycardia  In setting of acute infection, though patient states she also has baseline sinus tachycardia.   - Continue to monitor as needed    Dialysis catheter tip thrombus  Catheter since removed due to MRSA bacteremia and sepsis. On Eliquis.  - Continue Eliquis but held today for possible permcath placement when cleared to proceed    ESRD on HD  Receiving dialysis sessions as per Nephrology, currently via temporary HD catheter as patients permcath was removed this admission on 2/7 in setting of sepsis, bacteremia. Will need more permanent HD access once clinically improved and bacteremia / fungemia cleared. Vascular Surgery following.   - Continue dialysis sessions  - Anticipate possible permcath placement when cleared   - Continue to save R arm (no BP cuffs, IVs or blood draws) and will plan for eventual AVF creation    CKD anemia  May also be further compounded by septicemia and fungemia. S/P PRBC transfusion with HD 2/12. She is anemic again today but no overt bleeding or hemolysis.   - Ordered for1u PRBC with dialysis.   - Continue epo    Renal bone disease  - Continue sevelamer    Hyponatremia, hypokalemia  - Nephrology to assist with management    Stage II decubitus wound on coccyx  Stable, appears to be healing. No malodor. No purulence. No locoregional erythema or induration or fluctuance  - Continue wound care  - Turn frequently    Hx of seizure  Stable  - On Keppra    Rheumatoid arthritis  Stable  - Continue Plaquenil    Abnormal imaging of the breast  As noted on CT chest 2/12. Asymmetric right breast density with nonspecific soft tissue infiltration extending to right chest wall.   - Patient will need non-emergent breast imaging correlation to exclude underlying malignancy including inflammatory carcinoma.    Retroperitoneal lymphadenopathy with a cluster of multiple enlarged left paraaortic and pelvic lymph nodes  As noted on CT abd/pelvis 2/12 and imaging prior. Rather stable in size.   - Patient will need continued follow up regarding these findings    Severe protein calorie malnutrition  Appreciate input from Nutrition  - Encourage po intake, trend weight      RESOLVED HOSPITAL PROBLEMS    Loose stools  Resolved. No hematochezia or melena. No abdominal tenderness. FOBT negative. Cdiff PCR negative. CT with possible mild wall thickening at rectosigmoid junction. Resolved without intervention.         Diet: Renal  DVT px: Eliquis,   Code Status: Full  Dispo: ESRD 2/21. Removing catheter aftewards. Repeat culture in 2 days. Titrate Rx accordingly. Patient wants to go home, known her prognosis is poor. Palliative care consulted.     
MEDICATIONS  (STANDING):  apixaban 2.5 milliGRAM(s) Oral two times a day  chlorhexidine 2% Cloths 1 Application(s) Topical daily  collagenase Ointment 1 Application(s) Topical daily  epoetin amee-epbx (RETACRIT) Injectable 92147 Unit(s) SubCutaneous <User Schedule>  fluconAZOLE   Tablet 200 milliGRAM(s) Oral daily  furosemide   Injectable 40 milliGRAM(s) IV Push two times a day  gabapentin 100 milliGRAM(s) Oral three times a day  hydroxychloroquine 200 milliGRAM(s) Oral daily  levETIRAcetam 500 milliGRAM(s) Oral daily  levETIRAcetam 250 milliGRAM(s) Oral <User Schedule>  metoprolol tartrate 50 milliGRAM(s) Oral two times a day  pantoprazole   Suspension 40 milliGRAM(s) Oral daily  sevelamer carbonate 800 milliGRAM(s) Oral three times a day with meals    MEDICATIONS  (PRN):  acetaminophen     Tablet .. 650 milliGRAM(s) Oral every 6 hours PRN Temp greater or equal to 38C (100.4F), Mild Pain (1 - 3)  ALBUTerol    90 MICROgram(s) HFA Inhaler 2 Puff(s) Inhalation every 6 hours PRN Bronchospasm  artificial tears (preservative free) Ophthalmic Solution 1 Drop(s) Both EYES two times a day PRN Dry Eyes  melatonin 3 milliGRAM(s) Oral at bedtime PRN Insomnia  ondansetron Injectable 4 milliGRAM(s) IV Push every 8 hours PRN Nausea and/or Vomiting  oxyCODONE    IR 5 milliGRAM(s) Oral every 4 hours PRN Moderate Pain (4 - 6)  senna 2 Tablet(s) Oral at bedtime PRN Constipation      41 year-old woman with ESRD on HD, rheumatoid arthritis, Raynaud's, tobacco and IV drug use disorder, admitted for episode of loss of consciousness, sepsis with organ dysfunction, and dialysis catheter tip thrombus, found to have MRSA bacteremia, since found to also have C.abicans and C.tropicalis on blood cultures taken when she was noted to have persisting fever despite removal of permcath and several doses of IV vancomycin. Caspofungin added and patient now clinically improving.     Sepsis with organ dysfunction (elevated lactate) due to MRSA bacteremia, present upon admission, unable to determine if PC source of her IV drug use disorder as source, now C.albicans and C.tropicalis in blood culture as well   In setting of IV drug use disorder. MRSA on blood culture 2/4. Dialysis catheter removed 2/7. R IJ Shiley since placed. Has been on IV vancomycin post dialysis but patient was noted to have persisting high fevers, found to have C.albicans and C.tropicalis on blood culture 2/11. TTE negative for vegetations. ID following. On Vancomycin with HD. Started on IV caspofungin 2/12, switched to PO Diflucan 2/16. Repeat cultures 2/12 and 2/15 no growth to date. However, patient still with intermittent fever. Recognize that Shiley needs to be removed.   After lengthy discussion with Nephrology and ID, given patient's past and current issues with vascular access, risk for stenosis / fibrosis to vessels with repeated line changes, difficulty getting labs without such access, decision made not to remove Shiley and instead draw blood cultures from Shiley after dialysis today.   - GRAHAM with atrial septal vegetation and possible aortic valve vegetation  - Transfered [facility] for CTS evaluation. Patient not a surgical candidate  - Still with persistent bacteremia and candemia. Unable to clear. HD line removed 2/21 after dialysis. TO remain without line for several days and then repeat blood cultures initially but now that the patient has decided on hospice care (2/24) no plans for further evaluation, HD or intervention.   - Continue ABX as per ID  - Monitor for fever, maintain MAP > 65    ESRD on HD  Dialysis catheter tip thrombus  Receiving dialysis sessions as per Nephrology, currently via temporary HD catheter as patients permcath was removed this admission on 2/7 in setting of sepsis, bacteremia. Will need more permanent HD access once clinically improved and bacteremia / fungemia cleared. Vascular Surgery following.   - HD ceased after deciding on hospice care effective 2/24/2022  - Continue to save R arm (no BP cuffs, IVs or blood draws) and will plan for eventual AVF creation  Catheter since removed due to MRSA bacteremia/candedemia and sepsis. On Eliquis.  - Continue Eliquis     CKD anemia  May also be further compounded by septicemia and fungemia. S/P PRBC transfusion with HD 2/12. She is anemic again today but no overt bleeding or hemolysis.   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 No malodor. No purulence. No locoregional erythema or induration or fluctuance  - Continue wound care  - Turn frequently    Hx of seizure  Stable  - On Keppra    Rheumatoid arthritis  Stable  - Continue Plaquenil  -Pain control    Abnormal imaging of the breast  As noted on CT chest 2/12. Asymmetric right breast density with nonspecific soft tissue infiltration extending to right chest wall.   - Patient will need non-emergent breast imaging correlation to exclude underlying malignancy including inflammatory carcinoma.    Retroperitoneal lymphadenopathy with a cluster of multiple enlarged left paraaortic and pelvic lymph nodes  As noted on CT abd/pelvis 2/12 and imaging prior. Rather stable in size.   - Patient will need continued follow up regarding these findings    Severe protein calorie malnutrition  Appreciate input from Nutrition  - Encourage po intake, trend weight      RESOLVED HOSPITAL PROBLEMS    Loose stools  Resolved. No hematochezia or melena. No abdominal tenderness. FOBT negative. Cdiff PCR negative. CT with possible mild wall thickening at rectosigmoid junction. Resolved without intervention.         Diet: Renal  DVT px: Eliquis,   Code Status: Full  Dispo: HD 2/21. Cathter removed. Initially plan for catheter/HD vacation for 3-5 days and then dialysis with catheter insertion and removal. Patient elected for home hospice care 2/24. Attempting to arrange. May be barrier with reported eviction and HCP substance abuse. Brother has agreed to be HCP for patient and oversee the home hospice care. Will discuss with patient, brother and SW in am.   
MEDICATIONS  (STANDING):  apixaban 2.5 milliGRAM(s) Oral two times a day  chlorhexidine 2% Cloths 1 Application(s) Topical daily  collagenase Ointment 1 Application(s) Topical daily  epoetin amee-epbx (RETACRIT) Injectable 93542 Unit(s) SubCutaneous <User Schedule>  fluconAZOLE   Tablet 200 milliGRAM(s) Oral daily  furosemide   Injectable 40 milliGRAM(s) IV Push two times a day  gabapentin 100 milliGRAM(s) Oral three times a day  hydroxychloroquine 200 milliGRAM(s) Oral daily  levETIRAcetam 500 milliGRAM(s) Oral daily  levETIRAcetam 250 milliGRAM(s) Oral <User Schedule>  metoprolol tartrate 50 milliGRAM(s) Oral two times a day  pantoprazole   Suspension 40 milliGRAM(s) Oral daily  sevelamer carbonate 800 milliGRAM(s) Oral three times a day with meals    MEDICATIONS  (PRN):  acetaminophen     Tablet .. 650 milliGRAM(s) Oral every 6 hours PRN Temp greater or equal to 38C (100.4F), Mild Pain (1 - 3)  ALBUTerol    90 MICROgram(s) HFA Inhaler 2 Puff(s) Inhalation every 6 hours PRN Bronchospasm  artificial tears (preservative free) Ophthalmic Solution 1 Drop(s) Both EYES two times a day PRN Dry Eyes  melatonin 3 milliGRAM(s) Oral at bedtime PRN Insomnia  ondansetron Injectable 4 milliGRAM(s) IV Push every 8 hours PRN Nausea and/or Vomiting  oxyCODONE    IR 5 milliGRAM(s) Oral every 4 hours PRN Moderate Pain (4 - 6)  senna 2 Tablet(s) Oral at bedtime PRN Constipation      41 year-old woman with ESRD on HD, rheumatoid arthritis, Raynaud's, tobacco and IV drug use disorder, admitted for episode of loss of consciousness, sepsis with organ dysfunction, and dialysis catheter tip thrombus, found to have MRSA bacteremia, since found to also have C.abicans and C.tropicalis on blood cultures taken when she was noted to have persisting fever despite removal of permcath and several doses of IV vancomycin. Caspofungin added and patient now clinically improving.     Sepsis with organ dysfunction (elevated lactate) due to MRSA bacteremia, present upon admission, unable to determine if PC source of her IV drug use disorder as source, now C.albicans and C.tropicalis in blood culture as well   In setting of IV drug use disorder. MRSA on blood culture 2/4. Dialysis catheter removed 2/7. R IJ Shiley since placed. Has been on IV vancomycin post dialysis but patient was noted to have persisting high fevers, found to have C.albicans and C.tropicalis on blood culture 2/11. TTE negative for vegetations. ID following. On Vancomycin with HD. Started on IV caspofungin 2/12, switched to PO Diflucan 2/16. Repeat cultures 2/12 and 2/15 no growth to date. However, patient still with intermittent fever. Recognize that Shiley needs to be removed.   After lengthy discussion with Nephrology and ID, given patient's past and current issues with vascular access, risk for stenosis / fibrosis to vessels with repeated line changes, difficulty getting labs without such access, decision made not to remove Shiley and instead draw blood cultures from Shiley after dialysis today.   - GRAHAM ordered to r/o subacute endocarditis.   - F/u Bld Cxs from [facility] 2/17  - Continue IV vancomycin post dialysis, eventually planned to complete 3/5  - Continue PO Diflucan 200mg daily until 2/26  - Monitor for fever, maintain MAP > 65  - Opthalmology eval in process of being requested, awaiting call back from specialist    Sinus tachycardia  In setting of acute infection, though patient states she also has baseline sinus tachycardia.   - Continue to monitor as needed    Dialysis catheter tip thrombus  Catheter since removed due to MRSA bacteremia and sepsis. On Eliquis.  - Continue Eliquis but held today for possible permcath placement when cleared to proceed    ESRD on HD  Receiving dialysis sessions as per Nephrology, currently via temporary HD catheter as patients permcath was removed this admission on 2/7 in setting of sepsis, bacteremia. Will need more permanent HD access once clinically improved and bacteremia / fungemia cleared. Vascular Surgery following.   - Continue dialysis sessions  - Anticipate possible permcath placement when cleared   - Continue to save R arm (no BP cuffs, IVs or blood draws) and will plan for eventual AVF creation    CKD anemia  May also be further compounded by septicemia and fungemia. S/P PRBC transfusion with HD 2/12. She is anemic again today but no overt bleeding or hemolysis.   - Ordered for1u PRBC with dialysis.   - Continue epo    Renal bone disease  - Continue sevelamer    Hyponatremia, hypokalemia  - Nephrology to assist with management    Stage II decubitus wound on coccyx  Stable, appears to be healing. No malodor. No purulence. No locoregional erythema or induration or fluctuance  - Continue wound care  - Turn frequently    Hx of seizure  Stable  - On Keppra    Rheumatoid arthritis  Stable  - Continue Plaquenil    Abnormal imaging of the breast  As noted on CT chest 2/12. Asymmetric right breast density with nonspecific soft tissue infiltration extending to right chest wall.   - Patient will need non-emergent breast imaging correlation to exclude underlying malignancy including inflammatory carcinoma.    Retroperitoneal lymphadenopathy with a cluster of multiple enlarged left paraaortic and pelvic lymph nodes  As noted on CT abd/pelvis 2/12 and imaging prior. Rather stable in size.   - Patient will need continued follow up regarding these findings    Severe protein calorie malnutrition  Appreciate input from Nutrition  - Encourage po intake, trend weight      RESOLVED HOSPITAL PROBLEMS    Loose stools  Resolved. No hematochezia or melena. No abdominal tenderness. FOBT negative. Cdiff PCR negative. CT with possible mild wall thickening at rectosigmoid junction. Resolved without intervention.         Diet: Renal  DVT px: Eliquis,   Code Status: Full  Dispo: To be determined     
    HPI: Pt is a 41y old Female former smoker (1/4 PPD, quit 12/2021) with a medical history of Lupus, RA, ANCA vasculitis with ESRD on HD (M/W/F), GERD, seizures, epilepsy, depression, COPD, HTN, substance abuse (marijuana, cocaine), IVDA (heroin), C. 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 Hospital course complicated by MRSA bacteremia 2/4 (treated with IV Vanco), dialysis catheter tip thrombus noted with permacath removed and RIJ Shiley placed 2/11, C.abicans and C. tropicalis fungemia 2/11 (IV Caspofungin X 4 days transitioned to IV Diflucan 2/16). 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 Transferred to [facility] for CT surgery evaluation. TTE at [facility] revealed LVEF 20-25%, Severely decreased global left ventricular systolic function, catheter seen in the right atrium with a highly mobile echogenicity visualized on aortic valve, thrombus, an mass or thrombus seen in the inferior vena cava measuring 3.9 x 1.5 x 1.4 cm. No intervention was planned per CT surgery. As per CTS the patient requires further Heme/Onc evaluation as she underwent a CT A/P at , which revealed an abnormal breast density and extensive lymphadenopathy. Additionally the patient will need eventual ischemic workup for decreased LVEF. Palliative Medicine Consult to further establish GOC asa we had seen the pt last adm  2/22/22 Seen and examined at bedside this am. Awake and alert. States she has chronic pain R/T arthritis. Denies dyspnea at rest.    Assessment and Plan:    1) Sepsis  -MRSA bacteremia  -Now candida in Bld C&S as well  -ID eval noted  -Cont abx/antifungal  as per ID        2) ESRD on HD  -Nephrology eval noted  -Has decided to terminate HD   -Add Xanax 0.5 mg PO Q6H PRN anxiety    3) Debility  -ESRD  -Bacteremia  -Poor PO intake  -Nutrition consult  -PT consult  -Hypoalbuminemia  -Anemia  chronic  -Pressure injury coccyx    4) Abnormal breast density  -CT chest 2/12  -Patient will need non-emergent breast imaging correlation to exclude underlying malignancy including inflammatory carcinoma.  -Onc eval    5) Advanced Directives  -Pt with capacity  -HCP naming Aunt on chart naming Gregoria Urbing   -MOLST DNR/DNI  -Terminate HD   -Transition home with hospice VNS/SW aware  -Will follow for support      
MEDICATIONS  (STANDING):  apixaban 2.5 milliGRAM(s) Oral two times a day  chlorhexidine 2% Cloths 1 Application(s) Topical daily  collagenase Ointment 1 Application(s) Topical daily  epoetin amee-epbx (RETACRIT) Injectable 85000 Unit(s) SubCutaneous <User Schedule>  fluconAZOLE   Tablet 200 milliGRAM(s) Oral daily  furosemide   Injectable 40 milliGRAM(s) IV Push two times a day  gabapentin 100 milliGRAM(s) Oral three times a day  hydroxychloroquine 200 milliGRAM(s) Oral daily  levETIRAcetam 500 milliGRAM(s) Oral daily  levETIRAcetam 250 milliGRAM(s) Oral <User Schedule>  metoprolol tartrate 50 milliGRAM(s) Oral two times a day  pantoprazole   Suspension 40 milliGRAM(s) Oral daily  sevelamer carbonate 800 milliGRAM(s) Oral three times a day with meals    MEDICATIONS  (PRN):  acetaminophen     Tablet .. 650 milliGRAM(s) Oral every 6 hours PRN Temp greater or equal to 38C (100.4F), Mild Pain (1 - 3)  ALBUTerol    90 MICROgram(s) HFA Inhaler 2 Puff(s) Inhalation every 6 hours PRN Bronchospasm  artificial tears (preservative free) Ophthalmic Solution 1 Drop(s) Both EYES two times a day PRN Dry Eyes  melatonin 3 milliGRAM(s) Oral at bedtime PRN Insomnia  ondansetron Injectable 4 milliGRAM(s) IV Push every 8 hours PRN Nausea and/or Vomiting  oxyCODONE    IR 5 milliGRAM(s) Oral every 4 hours PRN Moderate Pain (4 - 6)  senna 2 Tablet(s) Oral at bedtime PRN Constipation      41 year-old woman with ESRD on HD, rheumatoid arthritis, Raynaud's, tobacco and IV drug use disorder, admitted for episode of loss of consciousness, sepsis with organ dysfunction, and dialysis catheter tip thrombus, found to have MRSA bacteremia, since found to also have C.abicans and C.tropicalis on blood cultures taken when she was noted to have persisting fever despite removal of permcath and several doses of IV vancomycin. Caspofungin added and patient now clinically improving.     Sepsis with organ dysfunction (elevated lactate) due to MRSA bacteremia, present upon admission, unable to determine if PC source of her IV drug use disorder as source, now C.albicans and C.tropicalis in blood culture as well   In setting of IV drug use disorder. MRSA on blood culture 2/4. Dialysis catheter removed 2/7. R IJ Shiley since placed. Has been on IV vancomycin post dialysis but patient was noted to have persisting high fevers, found to have C.albicans and C.tropicalis on blood culture 2/11. TTE negative for vegetations. ID following. On Vancomycin with HD. Started on IV caspofungin 2/12, switched to PO Diflucan 2/16. Repeat cultures 2/12 and 2/15 no growth to date. However, patient still with intermittent fever. Recognize that Shiley needs to be removed.   After lengthy discussion with Nephrology and ID, given patient's past and current issues with vascular access, risk for stenosis / fibrosis to vessels with repeated line changes, difficulty getting labs without such access, decision made not to remove Shiley and instead draw blood cultures from Shiley after dialysis today.   - GRAHAM with atrial septal vegetation and possible aortic valve vegetation  - Transfered [facility] for CTS evaluation. Patient not a surgical candidate  - Still with persistent bacteremia and candemia. Unable to clear. HD line removed 2/21 after dialysis. TO remain without line for several days and then repeat blood cultures.   - Continue ABX as per ID  - Monitor for fever, maintain MAP > 65    ESRD on HD  Dialysis catheter tip thrombus  Receiving dialysis sessions as per Nephrology, currently via temporary HD catheter as patients permcath was removed this admission on 2/7 in setting of sepsis, bacteremia. Will need more permanent HD access once clinically improved and bacteremia / fungemia cleared. Vascular Surgery following.   - Continue dialysis sessions  - Anticipate possible permcath placement when cleared   - Continue to save R arm (no BP cuffs, IVs or blood draws) and will plan for eventual AVF creation  Catheter since removed due to MRSA bacteremia/candedemia and sepsis. On Eliquis.  - Continue Eliquis     CKD anemia  May also be further compounded by septicemia and fungemia. S/P PRBC transfusion with HD 2/12. She is anemic again today but no overt bleeding or hemolysis.   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 No malodor. No purulence. No locoregional erythema or induration or fluctuance  - Continue wound care  - Turn frequently    Hx of seizure  Stable  - On Keppra    Rheumatoid arthritis  Stable  - Continue Plaquenil    Abnormal imaging of the breast  As noted on CT chest 2/12. Asymmetric right breast density with nonspecific soft tissue infiltration extending to right chest wall.   - Patient will need non-emergent breast imaging correlation to exclude underlying malignancy including inflammatory carcinoma.    Retroperitoneal lymphadenopathy with a cluster of multiple enlarged left paraaortic and pelvic lymph nodes  As noted on CT abd/pelvis 2/12 and imaging prior. Rather stable in size.   - Patient will need continued follow up regarding these findings    Severe protein calorie malnutrition  Appreciate input from Nutrition  - Encourage po intake, trend weight      RESOLVED HOSPITAL PROBLEMS    Loose stools  Resolved. No hematochezia or melena. No abdominal tenderness. FOBT negative. Cdiff PCR negative. CT with possible mild wall thickening at rectosigmoid junction. Resolved without intervention.         Diet: Renal  DVT px: Eliquis,   Code Status: Full  Dispo: HD 2/21. Removing catheter aftewards. Repeat blood culture in 2-3 days. Titrate Rx accordingly. Patient wants to go home, known her prognosis is poor. Palliative care consulted. Patient wants to continue HD but each catheter insertion will be source for persistent bacteremia and candedemia to be contaminate line leading to more persistent infeciton. Unable to clear patient to go home with persistent blood stream infection. Plan is to attempt to clear blood infection. Line removed. Repeat blood cultures in several days. And will require line re-insertion in several days to undergo HD again.     
    HPI: Pt is a 41y old Female former smoker (1/4 PPD, quit 12/2021) with a medical history of Lupus, RA, ANCA vasculitis with ESRD on HD (M/W/F), GERD, seizures, epilepsy, depression, COPD, HTN, substance abuse (marijuana, cocaine), IVDA (heroin), C. 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 Hospital course complicated by MRSA bacteremia 2/4 (treated with IV Vanco), dialysis catheter tip thrombus noted with permacath removed and RIJ Shiley placed 2/11, C.abicans and C. tropicalis fungemia 2/11 (IV Caspofungin X 4 days transitioned to IV Diflucan 2/16). 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 Transferred to [facility] for CT surgery evaluation. TTE at [facility] revealed LVEF 20-25%, Severely decreased global left ventricular systolic function, catheter seen in the right atrium with a highly mobile echogenicity visualized on aortic valve, thrombus, an mass or thrombus seen in the inferior vena cava measuring 3.9 x 1.5 x 1.4 cm. No intervention was planned per CT surgery. As per CTS the patient requires further Heme/Onc evaluation as she underwent a CT A/P at , which revealed an abnormal breast density and extensive lymphadenopathy. Additionally the patient will need eventual ischemic workup for decreased LVEF. Palliative Medicine Consult to further establish GOC asa we had seen the pt last adm  2/22/22 Seen and examined at bedside this am. Awake and alert. States she has chronic pain R/T arthritis. Denies dyspnea at rest.    Assessment and Plan:    1) Sepsis  -MRSA bacteremia  -Now candida in Bld C&S as well  -ID eval noted  -Cont abx/antifungal  as per ID  -HD accsess complicated   -Plan to remove and replace when bacteremia cleared     2) ESRD on HD  -Nephrology eval noted  -Has decided to terminate HD after Fri  -Add Xanax 0.5 mg PO Q6H PRN anxiety    3) Debility  -ESRD  -Bacteremia  -Poor PO intake  -Nutrition consult  -PT consult  -Hypoalbuminemia  -Anemia  chronic  -Pressure injury coccyx    4) Abnormal breast density  -CT chest 2/12  -Patient will need non-emergent breast imaging correlation to exclude underlying malignancy including inflammatory carcinoma.  -Onc eval    5) Advanced Directives  -Pt with capacity  -HCP naming Aunt on chart naming Gregoria Urbing   -MOLST DNR/DNI  -Terminate HD after Fri  -Transition home with hospice VNS/SW aware  -Will follow

## 2022-02-24 NOTE — PROGRESS NOTE ADULT - REASON FOR ADMISSION
Bacteremia

## 2022-02-24 NOTE — PROGRESS NOTE ADULT - NUTRITIONAL ASSESSMENT
This patient has been assessed with a concern for Malnutrition and has been determined to have a diagnosis/diagnoses of Severe protein-calorie malnutrition and Underweight (BMI < 19).    This patient is being managed with:   Diet Renal Restrictions-  For patients receiving Renal Replacement - No Protein Restr No Conc K No Conc Phos Low Sodium  Entered: Feb 19 2022  6:08PM    
This patient has been assessed with a concern for Malnutrition and has been determined to have a diagnosis/diagnoses of Severe protein-calorie malnutrition and Underweight (BMI < 19).    This patient is being managed with:   Diet Renal Restrictions-  For patients receiving Renal Replacement - No Protein Restr No Conc K No Conc Phos Low Sodium  800mL Fluid Restriction ([id number])  Entered: Feb 23 2022  9:06AM    
This patient has been assessed with a concern for Malnutrition and has been determined to have a diagnosis/diagnoses of Severe protein-calorie malnutrition and Underweight (BMI < 19).    This patient is being managed with:   Diet Renal Restrictions-  For patients receiving Renal Replacement - No Protein Restr No Conc K No Conc Phos Low Sodium  Entered: Feb 19 2022  6:08PM    
This patient has been assessed with a concern for Malnutrition and has been determined to have a diagnosis/diagnoses of Severe protein-calorie malnutrition and Underweight (BMI < 19).    This patient is being managed with:   Diet Renal Restrictions-  For patients receiving Renal Replacement - No Protein Restr No Conc K No Conc Phos Low Sodium  800mL Fluid Restriction ([id number])  Entered: Feb 23 2022  9:06AM    
This patient has been assessed with a concern for Malnutrition and has been determined to have a diagnosis/diagnoses of Severe protein-calorie malnutrition and Underweight (BMI < 19).    This patient is being managed with:   Diet Renal Restrictions-  For patients receiving Renal Replacement - No Protein Restr No Conc K No Conc Phos Low Sodium  Entered: Feb 19 2022  6:08PM

## 2022-02-25 VITALS
HEART RATE: 73 BPM | SYSTOLIC BLOOD PRESSURE: 120 MMHG | RESPIRATION RATE: 18 BRPM | TEMPERATURE: 97 F | DIASTOLIC BLOOD PRESSURE: 84 MMHG | OXYGEN SATURATION: 100 %

## 2022-02-25 LAB
ALBUMIN SERPL ELPH-MCNC: 1.5 G/DL — LOW (ref 3.3–5)
ALP SERPL-CCNC: 80 U/L — SIGNIFICANT CHANGE UP (ref 40–120)
ALT FLD-CCNC: <6 U/L — LOW (ref 12–78)
ANION GAP SERPL CALC-SCNC: 11 MMOL/L — SIGNIFICANT CHANGE UP (ref 5–17)
AST SERPL-CCNC: 9 U/L — LOW (ref 15–37)
BASOPHILS # BLD AUTO: 0.08 K/UL — SIGNIFICANT CHANGE UP (ref 0–0.2)
BASOPHILS NFR BLD AUTO: 0.8 % — SIGNIFICANT CHANGE UP (ref 0–2)
BILIRUB SERPL-MCNC: 0.3 MG/DL — SIGNIFICANT CHANGE UP (ref 0.2–1.2)
BUN SERPL-MCNC: 31 MG/DL — HIGH (ref 7–23)
CALCIUM SERPL-MCNC: 7.6 MG/DL — LOW (ref 8.5–10.1)
CHLORIDE SERPL-SCNC: 97 MMOL/L — SIGNIFICANT CHANGE UP (ref 96–108)
CO2 SERPL-SCNC: 21 MMOL/L — LOW (ref 22–31)
CREAT SERPL-MCNC: 5.78 MG/DL — HIGH (ref 0.5–1.3)
EOSINOPHIL # BLD AUTO: 0.07 K/UL — SIGNIFICANT CHANGE UP (ref 0–0.5)
EOSINOPHIL NFR BLD AUTO: 0.7 % — SIGNIFICANT CHANGE UP (ref 0–6)
GLUCOSE SERPL-MCNC: 78 MG/DL — SIGNIFICANT CHANGE UP (ref 70–99)
HCT VFR BLD CALC: 23.8 % — LOW (ref 34.5–45)
HGB BLD-MCNC: 7.5 G/DL — LOW (ref 11.5–15.5)
IMM GRANULOCYTES NFR BLD AUTO: 1 % — SIGNIFICANT CHANGE UP (ref 0–1.5)
LYMPHOCYTES # BLD AUTO: 1.23 K/UL — SIGNIFICANT CHANGE UP (ref 1–3.3)
LYMPHOCYTES # BLD AUTO: 12.8 % — LOW (ref 13–44)
MAGNESIUM SERPL-MCNC: 2 MG/DL — SIGNIFICANT CHANGE UP (ref 1.6–2.6)
MCHC RBC-ENTMCNC: 27.9 PG — SIGNIFICANT CHANGE UP (ref 27–34)
MCHC RBC-ENTMCNC: 31.5 GM/DL — LOW (ref 32–36)
MCV RBC AUTO: 88.5 FL — SIGNIFICANT CHANGE UP (ref 80–100)
MONOCYTES # BLD AUTO: 0.93 K/UL — HIGH (ref 0–0.9)
MONOCYTES NFR BLD AUTO: 9.7 % — SIGNIFICANT CHANGE UP (ref 2–14)
NEUTROPHILS # BLD AUTO: 7.18 K/UL — SIGNIFICANT CHANGE UP (ref 1.8–7.4)
NEUTROPHILS NFR BLD AUTO: 75 % — SIGNIFICANT CHANGE UP (ref 43–77)
PHOSPHATE SERPL-MCNC: 6.1 MG/DL — HIGH (ref 2.5–4.5)
PLATELET # BLD AUTO: 223 K/UL — SIGNIFICANT CHANGE UP (ref 150–400)
POTASSIUM SERPL-MCNC: 3.8 MMOL/L — SIGNIFICANT CHANGE UP (ref 3.5–5.3)
POTASSIUM SERPL-SCNC: 3.8 MMOL/L — SIGNIFICANT CHANGE UP (ref 3.5–5.3)
PROT SERPL-MCNC: 5.6 GM/DL — LOW (ref 6–8.3)
RBC # BLD: 2.69 M/UL — LOW (ref 3.8–5.2)
RBC # FLD: 17.3 % — HIGH (ref 10.3–14.5)
SODIUM SERPL-SCNC: 129 MMOL/L — LOW (ref 135–145)
WBC # BLD: 9.59 K/UL — SIGNIFICANT CHANGE UP (ref 3.8–10.5)
WBC # FLD AUTO: 9.59 K/UL — SIGNIFICANT CHANGE UP (ref 3.8–10.5)

## 2022-02-25 PROCEDURE — 99239 HOSP IP/OBS DSCHRG MGMT >30: CPT

## 2022-02-25 RX ORDER — ALPRAZOLAM 0.25 MG
1 TABLET ORAL
Qty: 2 | Refills: 0
Start: 2022-02-25 | End: 2022-02-26

## 2022-02-25 RX ORDER — OXYCODONE HYDROCHLORIDE 5 MG/1
1 TABLET ORAL
Qty: 8 | Refills: 0
Start: 2022-02-25 | End: 2022-02-26

## 2022-02-25 RX ADMIN — CHLORHEXIDINE GLUCONATE 1 APPLICATION(S): 213 SOLUTION TOPICAL at 09:49

## 2022-02-25 RX ADMIN — LEVETIRACETAM 250 MILLIGRAM(S): 250 TABLET, FILM COATED ORAL at 16:41

## 2022-02-25 RX ADMIN — Medication 0.5 MILLIGRAM(S): at 09:39

## 2022-02-25 RX ADMIN — PANTOPRAZOLE SODIUM 40 MILLIGRAM(S): 20 TABLET, DELAYED RELEASE ORAL at 09:41

## 2022-02-25 RX ADMIN — OXYCODONE HYDROCHLORIDE 10 MILLIGRAM(S): 5 TABLET ORAL at 17:53

## 2022-02-25 RX ADMIN — APIXABAN 2.5 MILLIGRAM(S): 2.5 TABLET, FILM COATED ORAL at 09:39

## 2022-02-25 RX ADMIN — SEVELAMER CARBONATE 800 MILLIGRAM(S): 2400 POWDER, FOR SUSPENSION ORAL at 17:40

## 2022-02-25 RX ADMIN — OXYCODONE HYDROCHLORIDE 10 MILLIGRAM(S): 5 TABLET ORAL at 11:28

## 2022-02-25 RX ADMIN — LEVETIRACETAM 500 MILLIGRAM(S): 250 TABLET, FILM COATED ORAL at 09:41

## 2022-02-25 RX ADMIN — Medication 0.5 MILLIGRAM(S): at 15:26

## 2022-02-25 RX ADMIN — OXYCODONE HYDROCHLORIDE 10 MILLIGRAM(S): 5 TABLET ORAL at 11:43

## 2022-02-25 RX ADMIN — OXYCODONE HYDROCHLORIDE 10 MILLIGRAM(S): 5 TABLET ORAL at 04:47

## 2022-02-25 RX ADMIN — Medication 0.5 MILLIGRAM(S): at 02:58

## 2022-02-25 RX ADMIN — SEVELAMER CARBONATE 800 MILLIGRAM(S): 2400 POWDER, FOR SUSPENSION ORAL at 12:04

## 2022-02-25 RX ADMIN — SEVELAMER CARBONATE 800 MILLIGRAM(S): 2400 POWDER, FOR SUSPENSION ORAL at 08:42

## 2022-02-25 RX ADMIN — Medication 50 MILLIGRAM(S): at 09:41

## 2022-02-25 RX ADMIN — Medication 200 MILLIGRAM(S): at 09:40

## 2022-02-25 RX ADMIN — OXYCODONE HYDROCHLORIDE 10 MILLIGRAM(S): 5 TABLET ORAL at 17:38

## 2022-02-25 NOTE — DISCHARGE NOTE PROVIDER - NSDCMRMEDTOKEN_GEN_ALL_CORE_FT
acetaminophen 325 mg oral tablet: 2 tab(s) orally every 6 hours, As needed, Temp greater or equal to 38.5C (101.3F), Mild Pain (1 - 3)  albuterol 90 mcg/inh inhalation aerosol: 2 puff(s) inhaled every 6 hours, As needed, Shortness of Breath and/or Wheezing  apixaban 2.5 mg oral tablet: 1 tab(s) orally 2 times a day  collagenase 250 units/g topical ointment: 1 application topically once a day  epoetin amee: 85300 unit(s) intravenous MWF  fluconazole 200 mg oral tablet: 1 tab(s) intravenous once a day  furosemide 10 mg/mL injectable solution: 40 milligram(s) intravenous 2 times a day  gabapentin 100 mg oral capsule: 1 cap(s) orally 3 times a day  hydroxychloroquine 200 mg oral tablet: 1 tab(s) orally once a day  levETIRAcetam 250 mg oral tablet: 1 tab(s) orally 3 times a week, MWF at 1600  levETIRAcetam 500 mg oral tablet: 1 tab(s) orally once a day  melatonin 3 mg oral tablet: 1 tab(s) orally once a day (at bedtime), As needed, Insomnia  metoprolol tartrate 50 mg oral tablet: 1 tab(s) orally 2 times a day  ocular lubricant ophthalmic solution: 1 drop(s) to each affected eye 2 times a day  oxyCODONE 5 mg oral capsule: 1 cap(s) orally every 6 hours, As Needed -for severe pain MDD:4 tabs  oxyCODONE 5 mg oral tablet: 1 tab(s) orally every 4 hours, As needed, Moderate Pain (4 - 6)  pantoprazole 40 mg oral delayed release tablet: 1 tab(s) orally once a day (before a meal)  senna oral tablet: 2 tab(s) orally once a day (at bedtime), As needed, Constipation  sevelamer carbonate 800 mg oral tablet: 1 tab(s) orally 3 times a day (with meals)  vancomycin 500 mg intravenous injection: 500 milligram(s) intravenous 3 times a week post dialysis  Xanax 0.25 mg oral tablet: 1 tab(s) orally once a day (at bedtime), As Needed -for insomnia or anxiety MDD:1 tab

## 2022-02-25 NOTE — DISCHARGE NOTE PROVIDER - NSDCCAREPROVSEEN_GEN_ALL_CORE_FT
Mally, Cheryl Harrington, Cheri Delgado, Tagenarayan Palla, Arjun Gu, Carlos Manuel Pena, Blake HAYES

## 2022-02-25 NOTE — DISCHARGE NOTE PROVIDER - DETAILS OF MALNUTRITION DIAGNOSIS/DIAGNOSES
This patient has been assessed with a concern for Malnutrition and was treated during this hospitalization for the following Nutrition diagnosis/diagnoses:     -  02/20/2022: Severe protein-calorie malnutrition   -  02/20/2022: Underweight (BMI < 19)

## 2022-02-25 NOTE — DISCHARGE NOTE PROVIDER - HOSPITAL COURSE
41 year-old woman with ESRD on HD, rheumatoid arthritis, Raynaud's, tobacco and IV drug use disorder, admitted for episode of loss of consciousness, sepsis with organ dysfunction, and dialysis catheter tip thrombus, found to have MRSA bacteremia, since found to also have C.abicans and C.tropicalis on blood cultures taken when she was noted to have persisting fever despite removal of permcath and several doses of IV vancomycin. Caspofungin added. F    Sepsis with organ dysfunction (elevated lactate) due to MRSA bacteremia and aortic valve and atrial septal endocarditis, present upon admission, unable to determine if PC source of her IV drug use disorder as source, now C.albicans and C.tropicalis in blood culture as well   In setting of IV drug use disorder. MRSA on blood culture 2/4. Dialysis catheter removed 2/7. R IJ Shiley since placed. Has been on IV vancomycin post dialysis but patient was noted to have persisting high fevers, found to have C.albicans and C.tropicalis on blood culture 2/11. TTE negative for vegetations. ID following. On Vancomycin with HD. Started on IV caspofungin 2/12, switched to PO Diflucan 2/16. Repeat cultures 2/12 and 2/15 no growth to date. However, patient still with intermittent fever. Recognize that Shiley needs to be removed.   After lengthy discussion with Nephrology and ID, given patient's past and current issues with vascular access, risk for stenosis / fibrosis to vessels with repeated line changes, difficulty getting labs without such access, decision made not to remove Shiley and instead draw blood cultures from Shiley after dialysis today.   - GRAHAM with atrial septal vegetation and possible aortic valve vegetation  - Transfered [facility] for CTS evaluation. Patient not a surgical candidate  - Still with persistent bacteremia and candemia. Unable to clear. HD line removed 2/21 after dialysis. TO remain without line for several days and then repeat blood cultures initially but now that the patient has decided on hospice care (2/24) no plans for further evaluation, HD or intervention.   - Continue ABX as per ID  - Monitor for fever, maintain MAP > 65    ESRD on HD  Dialysis catheter tip thrombus  Receiving dialysis sessions as per Nephrology, currently via temporary HD catheter as patients permcath was removed this admission on 2/7 in setting of sepsis, bacteremia. Will need more permanent HD access once clinically improved and bacteremia / fungemia cleared. Vascular Surgery following.   - HD ceased after deciding on hospice care effective 2/24/2022  - Continue to save R arm (no BP cuffs, IVs or blood draws) and will plan for eventual AVF creation  Catheter since removed due to MRSA bacteremia/candedemia and sepsis. On Eliquis.  - Continue Eliquis     CKD anemia  May also be further compounded by septicemia and fungemia. S/P PRBC transfusion with HD 2/12. She is anemic again today but no overt bleeding or hemolysis.   - Discontinuation of HD/Treatment to execute hospice/comfort care measures    Renal bone disease  - Continue sevelamer      Stage IV decubitus wound on coccyx POA  Stable, appears to be healing. No malodor. No purulence. No locoregional erythema or induration or fluctuance  - Continue wound care  - Turn frequently    Hx of seizure  Stable  - On Keppra    Rheumatoid arthritis  Stable  - Continue Plaquenil  -Pain control    Abnormal imaging of the breast  As noted on CT chest 2/12. Asymmetric right breast density with nonspecific soft tissue infiltration extending to right chest wall.   - Patient will need non-emergent breast imaging correlation to exclude underlying malignancy including inflammatory carcinoma.    Retroperitoneal lymphadenopathy with a cluster of multiple enlarged left paraaortic and pelvic lymph nodes  As noted on CT abd/pelvis 2/12 and imaging prior. Rather stable in size.   - Patient will need continued follow up regarding these findings    Severe protein calorie malnutrition  Appreciate input from Nutrition  - Encourage po intake, trend weight        Diet: Renal  DVT px: Eliquis,   Code Status: Full  Dispo: HD 2/21. Cathter removed. Initially plan for catheter/HD vacation for 3-5 days and then dialysis with catheter insertion and removal. Patient elected for home hospice care 2/24. Wouldn't be able to be executed until 2/28/2022. Patient with little time expected left in her life didn't want to stay in the hospital. Unable to discharge with out service arrangements completed. Patient aware but didn't want to wait and left AMA prior to having completed bridge to home hospice care. Home hospice care arrangement still underway as per hospice/SW/palliative care. Patient left AMA understanding that comfort care services would not be available until monday. She and her brother verbalized understanding. Patient left AMA.   T(C): 36.3 (02-25-22 @ 15:51), Max: 36.3 (02-25-22 @ 15:51)  HR: 73 (02-25-22 @ 15:51) (73 - 73)  BP: 120/84 (02-25-22 @ 15:51) (120/84 - 120/84)  RR: 18 (02-25-22 @ 15:51) (18 - 18)  SpO2: 100% (02-25-22 @ 15:51) (100% - 100%)  AAOx3; Frail and chronically ill appearing  Diffus muscle atrophy  Multiple needle track marks and ecchymoses  RRR, S1 and S2 present; Murmur +  Generalized weakness most notably in LE bilaterally  Sacral ulcer  Discharge Management: 42 minutes  Date of Discharge/Service and patient leaving ama: 2/25/2022  Patient counseled on consequences of leaving AMA. Expected deterition in health status expected with or without hospice care completely set up but ability to titrate comfort care measures if patient leaves AMA. Patient verbalized understanding and left AMA.

## 2022-02-25 NOTE — HOSPICE CARE NOTE - CONVESATION DETAILS
Referral received. Per MICKEY Keith, pt requesting to leave today. Pt with complicated living situation that will require an in person nurse/SW visit from team in the community. Dual referral made to [facility], who is able to see pt on Monday. [facility] team unable to see pt before Monday. Pt will go to [facility]. MICKEY Keith aware. Will close referral.

## 2022-02-25 NOTE — CHART NOTE - NSCHARTNOTEFT_GEN_A_CORE
Pt going home with inpatient hospice. Asked by medicine to remove midline.  Midline catheter removed without event.  No bleeding noted. Pressure dressing applied.

## 2022-02-25 NOTE — DISCHARGE NOTE PROVIDER - NSDCCPCAREPLAN_GEN_ALL_CORE_FT
PRINCIPAL DISCHARGE DIAGNOSIS  Diagnosis: Bacteremia  Assessment and Plan of Treatment: and Candedemia. Original Source is unlikely to be line/catheter but due to IVDA initially which eventually led to endocarditis and inevitably the line/cathter  was likely contaminated but the line/cathter was required to execute ESRD to maintain patient's life.      SECONDARY DISCHARGE DIAGNOSES  Diagnosis: Endocarditis  Assessment and Plan of Treatment:     Diagnosis: IV drug abuse  Assessment and Plan of Treatment: with Polysubstance abuse

## 2022-02-26 ENCOUNTER — TRANSCRIPTION ENCOUNTER (OUTPATIENT)
Age: 42
End: 2022-02-26

## 2022-02-27 DIAGNOSIS — I34.0 NONRHEUMATIC MITRAL (VALVE) INSUFFICIENCY: ICD-10-CM

## 2022-02-27 DIAGNOSIS — I38 ENDOCARDITIS, VALVE UNSPECIFIED: ICD-10-CM

## 2022-02-27 DIAGNOSIS — R65.20 SEVERE SEPSIS WITHOUT SEPTIC SHOCK: ICD-10-CM

## 2022-02-27 DIAGNOSIS — M06.9 RHEUMATOID ARTHRITIS, UNSPECIFIED: ICD-10-CM

## 2022-02-27 DIAGNOSIS — E87.1 HYPO-OSMOLALITY AND HYPONATREMIA: ICD-10-CM

## 2022-02-27 DIAGNOSIS — M32.9 SYSTEMIC LUPUS ERYTHEMATOSUS, UNSPECIFIED: ICD-10-CM

## 2022-02-27 DIAGNOSIS — I82.220 ACUTE EMBOLISM AND THROMBOSIS OF INFERIOR VENA CAVA: ICD-10-CM

## 2022-02-27 DIAGNOSIS — J44.9 CHRONIC OBSTRUCTIVE PULMONARY DISEASE, UNSPECIFIED: ICD-10-CM

## 2022-02-27 DIAGNOSIS — Z51.5 ENCOUNTER FOR PALLIATIVE CARE: ICD-10-CM

## 2022-02-27 DIAGNOSIS — Z20.822 CONTACT WITH AND (SUSPECTED) EXPOSURE TO COVID-19: ICD-10-CM

## 2022-02-27 DIAGNOSIS — I36.1 NONRHEUMATIC TRICUSPID (VALVE) INSUFFICIENCY: ICD-10-CM

## 2022-02-27 DIAGNOSIS — N18.6 END STAGE RENAL DISEASE: ICD-10-CM

## 2022-02-27 DIAGNOSIS — F11.10 OPIOID ABUSE, UNCOMPLICATED: ICD-10-CM

## 2022-02-27 DIAGNOSIS — A41.02 SEPSIS DUE TO METHICILLIN RESISTANT STAPHYLOCOCCUS AUREUS: ICD-10-CM

## 2022-02-27 DIAGNOSIS — D63.1 ANEMIA IN CHRONIC KIDNEY DISEASE: ICD-10-CM

## 2022-02-27 DIAGNOSIS — Z87.891 PERSONAL HISTORY OF NICOTINE DEPENDENCE: ICD-10-CM

## 2022-02-27 DIAGNOSIS — I50.23 ACUTE ON CHRONIC SYSTOLIC (CONGESTIVE) HEART FAILURE: ICD-10-CM

## 2022-02-27 DIAGNOSIS — E87.6 HYPOKALEMIA: ICD-10-CM

## 2022-02-27 DIAGNOSIS — Z99.2 DEPENDENCE ON RENAL DIALYSIS: ICD-10-CM

## 2022-02-27 DIAGNOSIS — B37.7 CANDIDAL SEPSIS: ICD-10-CM

## 2022-02-27 DIAGNOSIS — Z79.01 LONG TERM (CURRENT) USE OF ANTICOAGULANTS: ICD-10-CM

## 2022-02-27 DIAGNOSIS — E43 UNSPECIFIED SEVERE PROTEIN-CALORIE MALNUTRITION: ICD-10-CM

## 2022-02-27 DIAGNOSIS — G40.909 EPILEPSY, UNSPECIFIED, NOT INTRACTABLE, WITHOUT STATUS EPILEPTICUS: ICD-10-CM

## 2022-02-27 DIAGNOSIS — I77.89 OTHER SPECIFIED DISORDERS OF ARTERIES AND ARTERIOLES: ICD-10-CM

## 2022-02-27 DIAGNOSIS — I13.2 HYPERTENSIVE HEART AND CHRONIC KIDNEY DISEASE WITH HEART FAILURE AND WITH STAGE 5 CHRONIC KIDNEY DISEASE, OR END STAGE RENAL DISEASE: ICD-10-CM

## 2022-02-27 DIAGNOSIS — L89.154 PRESSURE ULCER OF SACRAL REGION, STAGE 4: ICD-10-CM

## 2022-02-27 LAB
-  LACTOBACILLUS SPECIES: SIGNIFICANT CHANGE UP
GRAM STN FLD: SIGNIFICANT CHANGE UP
METHOD TYPE: SIGNIFICANT CHANGE UP

## 2022-02-28 DIAGNOSIS — R59.0 LOCALIZED ENLARGED LYMPH NODES: ICD-10-CM

## 2022-02-28 DIAGNOSIS — I50.23 ACUTE ON CHRONIC SYSTOLIC (CONGESTIVE) HEART FAILURE: ICD-10-CM

## 2022-02-28 DIAGNOSIS — A41.02 SEPSIS DUE TO METHICILLIN RESISTANT STAPHYLOCOCCUS AUREUS: ICD-10-CM
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
## 2022-02-28 DIAGNOSIS — R64 CACHEXIA: ICD-10-CM

## 2022-02-28 DIAGNOSIS — Z99.2 DEPENDENCE ON RENAL DIALYSIS: ICD-10-CM

## 2022-02-28 DIAGNOSIS — I82.220 ACUTE EMBOLISM AND THROMBOSIS OF INFERIOR VENA CAVA: ICD-10-CM

## 2022-02-28 DIAGNOSIS — F11.10 OPIOID ABUSE, UNCOMPLICATED: ICD-10-CM

## 2022-02-28 DIAGNOSIS — N18.6 END STAGE RENAL DISEASE: ICD-10-CM

## 2022-02-28 DIAGNOSIS — M32.9 SYSTEMIC LUPUS ERYTHEMATOSUS, UNSPECIFIED: ICD-10-CM

## 2022-02-28 DIAGNOSIS — B37.7 CANDIDAL SEPSIS: ICD-10-CM

## 2022-02-28 DIAGNOSIS — E87.1 HYPO-OSMOLALITY AND HYPONATREMIA: ICD-10-CM

## 2022-02-28 DIAGNOSIS — D63.1 ANEMIA IN CHRONIC KIDNEY DISEASE: ICD-10-CM

## 2022-02-28 DIAGNOSIS — T80.211A BLOODSTREAM INFECTION DUE TO CENTRAL VENOUS CATHETER, INITIAL ENCOUNTER: ICD-10-CM

## 2022-02-28 DIAGNOSIS — L89.154 PRESSURE ULCER OF SACRAL REGION, STAGE 4: ICD-10-CM

## 2022-02-28 DIAGNOSIS — J44.9 CHRONIC OBSTRUCTIVE PULMONARY DISEASE, UNSPECIFIED: ICD-10-CM

## 2022-02-28 DIAGNOSIS — I13.2 HYPERTENSIVE HEART AND CHRONIC KIDNEY DISEASE WITH HEART FAILURE AND WITH STAGE 5 CHRONIC KIDNEY DISEASE, OR END STAGE RENAL DISEASE: ICD-10-CM

## 2022-02-28 DIAGNOSIS — F14.19 COCAINE ABUSE WITH UNSPECIFIED COCAINE-INDUCED DISORDER: ICD-10-CM

## 2022-02-28 DIAGNOSIS — F32.A DEPRESSION, UNSPECIFIED: ICD-10-CM

## 2022-02-28 DIAGNOSIS — K21.9 GASTRO-ESOPHAGEAL REFLUX DISEASE WITHOUT ESOPHAGITIS: ICD-10-CM

## 2022-02-28 DIAGNOSIS — Z87.891 PERSONAL HISTORY OF NICOTINE DEPENDENCE: ICD-10-CM

## 2022-02-28 DIAGNOSIS — E43 UNSPECIFIED SEVERE PROTEIN-CALORIE MALNUTRITION: ICD-10-CM

## 2022-02-28 DIAGNOSIS — I51.3 INTRACARDIAC THROMBOSIS, NOT ELSEWHERE CLASSIFIED: ICD-10-CM

## 2022-02-28 DIAGNOSIS — G40.909 EPILEPSY, UNSPECIFIED, NOT INTRACTABLE, WITHOUT STATUS EPILEPTICUS: ICD-10-CM

## 2022-02-28 DIAGNOSIS — F12.10 CANNABIS ABUSE, UNCOMPLICATED: ICD-10-CM

## 2022-03-01 LAB — GRAM STN FLD: SIGNIFICANT CHANGE UP

## 2022-03-05 NOTE — PROCEDURE NOTE - NSINFORMCONSENT_GEN_A_CORE
Rox Sebastian is a 30 year old female presenting to the walk-in clinic today for cyst near left eye and left eye swelling. States that cyst has been there for a while but under skin. Started changing in size the last 1-2 weeks. Has hx of cystic acne. Left eye swelling x3-4 days. Worse last day or so. Swelling worse in morning. Has been doing virtual visits. Was started on Cephalexin 2 days ago. States that after starting antibiotic feels like heart beating faster and feels sob at times.     Swabs/Specimens collected during rooming process:  None    BP greater than 140/90: No   Recheck completed: No    PPE worn during room process  Writer: N95, Face shield/eye protection, gown, gloves  Patient: mask     Patient would like communication of their results via:        Cell Phone:   Telephone Information:   Mobile [phone]     Okay to leave a message containing results? Yes  
This was an emergent procedure.

## 2022-03-07 NOTE — DIETITIAN INITIAL EVALUATION ADULT. - PHYSICAL ASSESSMENT THIGH
severe
acute on chronic comorbidities including acte CVA/change in mental status/difficulty swallowing

## 2022-03-12 LAB
CULTURE RESULTS: SIGNIFICANT CHANGE UP
SPECIMEN SOURCE: SIGNIFICANT CHANGE UP

## 2022-03-15 LAB
-  AMPHOTERICIN B: SIGNIFICANT CHANGE UP
-  ANIDULAFUNGIN: SIGNIFICANT CHANGE UP
-  CASPOFUNGIN: SIGNIFICANT CHANGE UP
-  FLUCONAZOLE: SIGNIFICANT CHANGE UP
-  MICAFUNGIN: SIGNIFICANT CHANGE UP
-  POSACONAZOLE: SIGNIFICANT CHANGE UP
-  VORICONAZOLE: SIGNIFICANT CHANGE UP
CULTURE RESULTS: SIGNIFICANT CHANGE UP
METHOD TYPE: SIGNIFICANT CHANGE UP
ORGANISM # SPEC MICROSCOPIC CNT: SIGNIFICANT CHANGE UP
ORGANISM # SPEC MICROSCOPIC CNT: SIGNIFICANT CHANGE UP
SPECIMEN SOURCE: SIGNIFICANT CHANGE UP

## 2022-05-12 NOTE — PROGRESS NOTE ADULT - GASTROINTESTINAL COMMENTS
slight discomfort
s/p PEG, slight discomfort to deep palpation
slightly distended right sided abdominal pain and tenderness
PEG in situ
slight discomfort
134

## 2022-05-31 ENCOUNTER — APPOINTMENT (OUTPATIENT)
Dept: NEUROLOGY | Facility: CLINIC | Age: 42
End: 2022-05-31

## 2022-06-15 NOTE — H&P ADULT - CARDIOVASCULAR DETAILS
----- Message from Jennifer Sloan sent at 6/15/2022  7:55 AM CDT -----  Pt did call back with new insurance and uploaded cards.  She had not given the new ins to her pharm so she is submitting to them and I am disregarding this PA. If the new [employer] still requires a PA on the [facility] they will fax the request.       positive S1/positive S2

## 2022-06-27 NOTE — PHYSICAL THERAPY INITIAL EVALUATION ADULT - GENERAL OBSERVATIONS, REHAB EVAL
Requesting referral to start Hospice services. Patient has agreed.       Fax#  [phone] sitting up unsupported at EOB waiting for lunch meal,awake,alert,Ox4,wearing soft wrist splint R hand ,multiple scars noted over R forearm/wrist/hand and pt reports h/o infection in past ,hand appears contracted ,as does L 4th digit ,poor dentition with missing mandibular teeth in front,pt reports h/o RA since age 21 and needs a L knee replacement ,L shoulder replacement & R wrist "is shot"

## 2022-07-21 NOTE — ED ADULT NURSE NOTE - PAIN RATING/NUMBER SCALE (0-10): ACTIVITY
Goal Outcome Evaluation:  Plan of Care Reviewed With: patient        Progress: no change  Outcome Evaluation: Initial nutrition assessment complete. Malnutrition assessment completed this day. See MSA note for further details. Cont to follow.  
Goal Outcome Evaluation:  Plan of Care Reviewed With: patient        Progress: no change  Outcome Evaluation: Patient received from ER via stretcher.  Patient is alert and orientated with no complaints of pain or discomfort.  Patient educated on room and safety precautions.  Patient denies s/s of cardiac or respiratory distress.  Patient placed on continuous cardiac monitoring; will continue to assess and treat patient per plan of care.  
8

## 2022-07-25 NOTE — DIETITIAN INITIAL EVALUATION ADULT. - PHYSICAL ASSESSMENT THIGH
Thalidomide Pregnancy And Lactation Text: This medication is Pregnancy Category X and is absolutely contraindicated during pregnancy. It is unknown if it is excreted in breast milk. severe

## 2022-09-26 NOTE — H&P ADULT - PSYCHIATRIC
Result reviewed. Notify pt normal. No further action at this time. If the patient notices a value in labs that is flagged as abnormal and I am not commenting on it that is because it is medically insignificant. It does not follow a pattern that adds up to a medical problem.
detailed exam

## 2022-09-26 NOTE — PROVIDER CONTACT NOTE (OTHER) - BACKGROUND
Denied request, duplicate. Rx just sent to pharmacy today 9/26/22.     Annabelle TOLBERT RN  [facility]   
Mark message sent to patient informing them they need to make an appointment with their PCP prior to getting another refill for their medication ARIPiprazole Oral Tablet 10 MG.    Anusha Montero RN    
her isolation status. 60A to 62, Rn checked her bag due to the suspicious of patient not taking medication and placing it in her bag. ANNIE Bolton checked bag while patient was being moved,

## 2022-11-07 NOTE — ADVANCED PRACTICE NURSE CONSULT - RECOMMEDATIONS
1)Continue to Elevate heels off of Mattress  2)Continue to Turn and position every 2 Hours  3)Consult Dietitian   4)Sacrum Wound: Apply Collagenase to wound bed and cover with a foam daily.     Scalpel Size: 15 blade

## 2023-04-25 NOTE — DIETITIAN INITIAL EVALUATION ADULT. - NUTRITION CONSULT
Render In Strict Bullet Format?: No Detail Level: Zone Plan: Contact office in 6months if not improved. Initiate Treatment: tretinoin 0.025 % topical cream QHS\\nQuantity: 45.0 g\\nSig: Apply a pea sized amount at bedtime\\n\\nMoisturizer QHS\\n\\nSPF QD - reapply q 2hrs when outdoors Modify Regimen: Begin applying Tretinoin 2-3 nights a week than increase to QHS depending upon skin tolerability yes

## 2023-04-27 NOTE — ED PROVIDER NOTE - NS ED MD DISPO DIVISION
Plan: Offered topical steroid (triamcinolone), but pt.'s mother declines, stating that rash does not appear to be bothersome to patient.  Discussed that if it is lichen striatus or lichen nitidus, it likely will resolve spontaneously over several months.  Should rash worsen, spread, or become symptomatic, pt. to return for reevaluation.  Pt. otherwise healthy and meeting all developmental milestones. Detail Level: Simple [facility]

## 2023-05-31 NOTE — DISCHARGE NOTE PROVIDER - HOSPITAL COURSE
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 (M/W/F), GERD, seizures, epilepsy, depression, COPD, HTN, substance abuse (marijuana, cocaine), IVDA (heroin), C.diff colitis 12/2021, bacteremia, fungemia, resp failure s/p Trach/PEG 7/13/20 (since reversed), stage 4 sacral decub since 2021, multiple episodes of seizures & syncope with HD, s/p hospitalization 12/15-12/18/21 for seizures (CSF negative), hospitalized at [facility] 1/18/22-2/2/22 after found unresponsive at home after being left alone for an hour, diagnosed with Aspiration PNA requiring intubation, LLL infiltrate s/p 7 day course of Vanco/Zosyn, discharged 2/2/22 and readmitted to [facility] 2/4/22 after LOC during HD. Hospital course complicated by MRSA bacteremia 2/4 (treated with IV Vanco), dialysis catheter tip thrombus noted with permacath removed on 2/7 and RIQUITA Barry placed 2/11, C.albicans and C.tropicalis fungemia 2/11 (IV Caspofungin X 4 days transitioned to IV Diflucan 2/16). Started on IV caspofungin 2/12. Repeat cultures 2/12 and 2/15 no growth to date. Switched to PO Diflucan 2/16. Patient with persistent fevers despite all of the above and so a subsequent GRAHAM 2/18 significant for multilobulated atrial mass attached to the septum, mild MR, mild TR, and an EF of 45-50%. Transferred to [facility] for further evaluation.   At - given patient's past and current issues with vascular access, risk for stenosis / fibrosis to vessels with repeated line changes, difficulty getting labs without such access, decision made not to remove Osirisley and instead draw blood cultures from Barry after dialysis 2/17    COURSE at [facility]:    Evaluated by CTS. No Surgical interventions offered. Medical management   Her Bld c/s from 17th sent at  resulted +   Rpt c/s sent early AM on 2/19 here at [facility]  BNP > 70 K with Na 128. Was dialyzed today too 2/19.   And Lasix 40mg IVP bid started  Eliquis restarted  Per cards Entresto to be started.    Being transferred back to  for continued medical management.    TIME - 55 mins
18

## 2023-09-13 NOTE — PATIENT PROFILE ADULT - FALL HARM RISK - HARM RISK INTERVENTIONS
Assistance with ambulation/Assistance OOB with selected safe patient handling equipment/Communicate Risk of Fall with Harm to all staff/Discuss with provider need for PT consult/Monitor gait and stability/Provide patient with walking aids - walker, cane, crutches/Reinforce activity limits and safety measures with patient and family/Tailored Fall Risk Interventions/Visual Cue: Yellow wristband and red socks/Bed in lowest position, wheels locked, appropriate side rails in place/Call bell, personal items and telephone in reach/Instruct patient to call for assistance before getting out of bed or chair/Non-slip footwear when patient is out of bed/[place] to call system/Physically safe environment - no spills, clutter or unnecessary equipment/Purposeful Proactive Rounding/Room/bathroom lighting operational, light cord in reach Excisional Biopsy Additional Text (Leave Blank If You Do Not Want): The margin was drawn around the clinically apparent lesion. An elliptical shape was then drawn on the skin incorporating the lesion and margins.  Incisions were then made along these lines to the appropriate tissue plane and the lesion was extirpated.

## 2023-10-11 NOTE — DIETITIAN NUTRITION RISK NOTIFICATION - TREATMENT: THE FOLLOWING DIET HAS BEEN RECOMMENDED
Diet, Renal Restrictions:   For patients receiving Renal Replacement - No Protein Restr, No Conc K, No Conc Phos, Low Sodium (02-19-22 @ 18:08) [Active]      
0 = swallows foods/liquids without difficulty

## 2024-01-19 NOTE — ED STATDOCS - NS_EDPROVIDERDISPOUSERTYPE_ED_A_ED
Subjective   History of Present Illness  Chief Complaint: Abdominal pain, generalized weakness    Patient is a 72-year-old  male history of A-fib on warfarin, CHF, CAD hypertension presents to the ER per POV with complaints of abdominal pain, nausea, hematuria and generalized weakness.  Patient states that he had prostate surgery 1/5/2024.  He states he was seen in the emergency department earlier this week for difficulty urinating and had cystoscopy and was discharged yesterday.  Patient states today he developed severe abdominal pain with nausea and vomiting and lightheadedness.  He reports that he has passed a few clots but his urine is not gross hematuria.  He states he is having difficulty urinating.  Subjective fever and chills.  Patient states that he had a near syncopal episode earlier today but did not fully pass out or lose consciousness.  No chest pain.    PCP: Colette Dominguez  Urology: [facility]    History provided by:  Patient      Review of Systems   Constitutional:  Positive for fatigue. Negative for fever.   HENT:  Negative for congestion, sore throat and trouble swallowing.    Eyes: Negative.    Respiratory:  Negative for cough, shortness of breath and wheezing.    Cardiovascular:  Negative for chest pain.   Gastrointestinal:  Positive for abdominal pain, nausea and vomiting.   Endocrine: Negative.    Genitourinary:  Positive for difficulty urinating and hematuria. Negative for dysuria.   Musculoskeletal:  Negative for myalgias.   Skin:  Negative for rash.   Allergic/Immunologic: Negative.    Neurological:  Positive for light-headedness and headaches.   Psychiatric/Behavioral:  Negative for behavioral problems.    All other systems reviewed and are negative.      Past Medical History:   Diagnosis Date    AAA (abdominal aortic aneurysm) 2006    Abnormal ECG     Aneurysm     Arthritis     Asthma     Atrial fibrillation     COUMADIN    Benign prostatic hyperplasia     Bladder cancer     CHF  (congestive heart failure)     Chronic anticoagulation     COUMADIN STARTED 2006    Colon polyp     Congenital heart disease     Coronary artery disease     Diabetes mellitus     Elevated cholesterol     Erectile dysfunction     Facial basal cell cancer     Gout     Hard to intubate     History of pancreatitis 2006    History of pneumonia     History of tick-borne disease 2015    HL (hearing loss)     BILAT HEARING AIDS    Hyperlipidemia     Hypertension     Myocardial infarction     X5    Obesity     Pancreatitis     Pneumonia     Sleep apnea     CURRENTLY NOT USING CPAP D/T RECALL    Transfusion history     STATES HIS SISTER HAD A SEVERE REACTION TO BLOOD TRANSFUSION    Type 2 diabetes mellitus        Allergies   Allergen Reactions    Bee Venom Anaphylaxis     HIVES-SWOLLEN THROAT    Meperidine Hives    Midazolam Hives    Propofol Other (See Comments)     UNCLEAR-SVT, HYPOTENSION-STATES SEVERE ALMOST CODED    Sulfa Antibiotics Hives    Hydrocodone Nausea And Vomiting    Simvastatin Myalgia       Past Surgical History:   Procedure Laterality Date    ARTERIOGRAM AORTIC Left 06/29/2022    Procedure: AORTIC STENT GRAFT PLACEMENT WITH ILIAC COILING;  Surgeon: Eric Sainz MD;  [facility]  Service: Vascular;  Laterality: Left;    CARDIAC CATHETERIZATION      CHOLECYSTECTOMY      COLONOSCOPY      COLONOSCOPY N/A 7/27/2023    Procedure: COLONOSCOPY to cecum with cold biopsy and cold snare polypectomies and clips;  Surgeon: Vitor Haley MD;  Location: [facility] ENDOSCOPY;  Service: Gastroenterology;  Laterality: N/A;  Pre - H/O polyps.  Post - diverticulosis, polyps, hemorrhoids    CORONARY ARTERY BYPASS GRAFT  2006    X2    CYSTOSCOPY      STATES HAD BLADDER TUMORS REMOVED X2    CYSTOSCOPY WITH CLOT EVACUATION N/A 1/17/2024    Procedure: CYSTOSCOPY WITH CLOT EVACUATION, WITH FULGRATION;  Surgeon: Daniel Hamm MD;  Location: [facility] OR;  Service: Urology;  Laterality: N/A;     Scribe Attestation (For Scribes USE Only)... PROSTATE SURGERY      X2- STATES PLACED COILS IN TO HELP WITH FLOW AND THEN HAD TO REMOVE A PIECE THAT BROKE OFF AND WAS IN BLADDER    UPPER GASTROINTESTINAL ENDOSCOPY         Family History   Problem Relation Age of Onset    Hypertension Mother     Colon cancer Mother     Cancer Mother     Diabetes Mother     Cancer Father     Heart attack Father     Hearing loss Father     Heart disease Father     Hypertension Sister     Hypertension Brother     Colon cancer Brother     Cancer Brother     Heart disease Paternal Grandfather     Heart attack Paternal Grandfather     Malig Hyperthermia Neg Hx     Colon polyps Neg Hx     Crohn's disease Neg Hx     Irritable bowel syndrome Neg Hx     Ulcerative colitis Neg Hx        Social History     Socioeconomic History    Marital status:      Spouse name: Nichelle    Number of children: 3    Years of education: 12   Tobacco Use    Smoking status: Former     Packs/day: 1.00     Years: 30.00     Additional pack years: 0.00     Total pack years: 30.00     Types: Cigarettes     Quit date: 2006     Years since quittin.6    Smokeless tobacco: Never   Vaping Use    Vaping Use: Never used   Substance and Sexual Activity    Alcohol use: Not Currently     Comment: one or two beers 6 times a year    Drug use: Never    Sexual activity: Not Currently     Partners: Female     Birth control/protection: None           Objective   Physical Exam  Vitals and nursing note reviewed.   Constitutional:       Appearance: Normal appearance. He is normal weight. He is ill-appearing.   HENT:      Head: Normocephalic and atraumatic.      Nose: Nose normal. No congestion.      Mouth/Throat:      Mouth: Mucous membranes are moist.   Eyes:      Extraocular Movements: Extraocular movements intact.      Pupils: Pupils are equal, round, and reactive to light.   Cardiovascular:      Rate and Rhythm: Normal rate and regular rhythm.      Pulses: Normal pulses.      Heart sounds: Normal heart sounds. No  "murmur heard.  Pulmonary:      Effort: Pulmonary effort is normal.      Breath sounds: Rales present.   Abdominal:      General: Abdomen is flat.      Palpations: Abdomen is soft.      Tenderness: There is abdominal tenderness. There is guarding. There is no left CVA tenderness.   Musculoskeletal:         General: Normal range of motion.   Skin:     General: Skin is warm.      Capillary Refill: Capillary refill takes less than 2 seconds.      Coloration: Skin is pale.      Findings: No bruising.   Neurological:      General: No focal deficit present.      Mental Status: He is alert and oriented to person, place, and time.   Psychiatric:         Mood and Affect: Mood normal.         Behavior: Behavior normal.         Procedures           ED Course  ED Course as of 01/19/24 1641   Fri Jan 19, 2024   1615 I spoke Dr. Hamm, urology, recommended CT cystogram, recommended inserting three-way Cardoza catheter for bladder decompression and IV antibiotics.  Patient admitted to the hospitalist with urology consultation. []   1627 I spoke with Dr. Stevens regarding admission []      ED Course User Index  [] Nichelle Chacon PA    /64   Pulse 81   Temp 98.1 °F (36.7 °C) (Temporal) Comment: oral would not read at triage.  Resp 18   Ht 180.3 cm (71\")   Wt 83.9 kg (185 lb)   SpO2 95%   BMI 25.80 kg/m²   Labs Reviewed   COMPREHENSIVE METABOLIC PANEL - Abnormal; Notable for the following components:       Result Value    Glucose 150 (*)     BUN 44 (*)     Creatinine 1.71 (*)     Sodium 133 (*)     Albumin 3.3 (*)     Alkaline Phosphatase 193 (*)     Total Bilirubin 1.4 (*)     BUN/Creatinine Ratio 25.7 (*)     eGFR 42.3 (*)     All other components within normal limits    Narrative:     GFR Normal >60  Chronic Kidney Disease <60  Kidney Failure <15    The GFR formula is only valid for adults with stable renal function between ages 18 and 70.   URINALYSIS W/ CULTURE IF INDICATED - Abnormal; Notable for the following " components:    Color, UA Red (*)     Appearance, UA Cloudy (*)     Glucose,  mg/dL (Trace) (*)     Ketones, UA 15 mg/dL (1+) (*)     Bilirubin, UA Large (3+) (*)     Blood, UA Large (3+) (*)     Protein, UA >=300 mg/dL (3+) (*)     Leuk Esterase, UA Large (3+) (*)     Nitrite, UA Positive (*)     Urobilinogen, UA 2.0 E.U./dL (*)     All other components within normal limits    Narrative:     In absence of clinical symptoms, the presence of pyuria, bacteria, and/or nitrites on the urinalysis result does not correlate with infection.   PROTIME-INR - Abnormal; Notable for the following components:    Protime 13.9 (*)     INR 1.30 (*)     All other components within normal limits   CBC WITH AUTO DIFFERENTIAL - Abnormal; Notable for the following components:    WBC 19.80 (*)     RDW 16.4 (*)     Neutrophil % 84.2 (*)     Lymphocyte % 8.1 (*)     Neutrophils, Absolute 16.60 (*)     Monocytes, Absolute 1.30 (*)     All other components within normal limits   URINALYSIS, MICROSCOPIC ONLY - Abnormal; Notable for the following components:    RBC, UA Too Numerous to Count (*)     WBC, UA 6-10 (*)     Bacteria, UA 1+ (*)     All other components within normal limits   LIPASE - Normal   BNP (IN-HOUSE) - Normal    Narrative:     This assay is used as an aid in the diagnosis of individuals suspected of having heart failure. It can be used as an aid in the diagnosis of acute decompensated heart failure (ADHF) in patients presenting with signs and symptoms of ADHF to the emergency department (ED). In addition, NT-proBNP of <300 pg/mL indicates ADHF is not likely.    Age Range Result Interpretation  NT-proBNP Concentration (pg/mL:      <50             Positive            >450                   Gray                 300-450                    Negative             <300    50-75           Positive            >900                  Gray                300-900                  Negative            <300      >75             Positive             >1800                  Gray                300-1800                  Negative            <300   POC LACTATE - Normal   BLOOD CULTURE   BLOOD CULTURE   URINE CULTURE   POC LACTATE   CBC AND DIFFERENTIAL    Narrative:     The following orders were created for panel order CBC & Differential.  Procedure                               Abnormality         Status                     ---------                               -----------         ------                     CBC Auto Differential[[id number]]        Abnormal            Final result                 Please view results for these tests on the individual orders.   EXTRA TUBES    Narrative:     The following orders were created for panel order Extra Tubes.  Procedure                               Abnormality         Status                     ---------                               -----------         ------                     Gold Top - SST[[id number]]                                   Final result                 Please view results for these tests on the individual orders.   GOLD TOP - SST     Medications   sodium chloride 0.9 % flush 10 mL (has no administration in time range)   cefTRIAXone (ROCEPHIN) 1,000 mg in sodium chloride 0.9 % 100 mL IVPB (1,000 mg Intravenous New Bag 1/19/24 1633)   ondansetron (ZOFRAN) injection 4 mg (4 mg Intravenous Given 1/19/24 1343)   sodium chloride 0.9 % bolus 500 mL (0 mL Intravenous Stopped 1/19/24 1530)   iopamidol (ISOVUE-370) 76 % injection 100 mL (100 mL Intravenous Given 1/19/24 1515)   morphine injection 2 mg (2 mg Intravenous Given 1/19/24 1607)   ondansetron (ZOFRAN) injection 4 mg (4 mg Intravenous Given 1/19/24 1607)   HYDROmorphone (DILAUDID) injection 1 mg (1 mg Intravenous Given 1/19/24 1631)     CT Abdomen Pelvis With Contrast    Result Date: 1/19/2024  Impression: 1.Findings suspicious for bladder leak/defect involving the right central bladder dome. CT cystogram might be useful if important to further assess  radiographically. 2.Irregular areas of increased intraluminal density within the bladder, presumably clot, with numerous surgical clips in the bladder base. 3.At least partial prostatectomy. 4.Other stable findings within the abdomen and pelvis. Electronically Signed: Lorenzo Williamson MD  1/19/2024 2:31 PM CST  Workstation ID: [id number]    CT Head Without Contrast    Result Date: 1/19/2024  Impression: No acute cranial process evident. Electronically Signed: Vance Coulter MD  1/19/2024 3:14 PM EST  Workstation ID: [id number]    XR Chest 1 View    Result Date: 1/19/2024  Impression: Mild CHF/volume overload features. Electronically Signed: Lorenzo Williamson MD  1/19/2024 12:50 PM CST  Workstation ID: [id number]                                            Medical Decision Making  Differential Dx (Includes but not limited to): Perforation abscess urinary retention, diverticulitis  Medical Records Reviewed: Patient had cystoscopy with clot evacuation on 1/17/2024  Labs: On my interpretation urinalysis 3+ bilirubin positive for blood leukocytes nitrites with TNTC RBCs.  Culture pending.  Lactate 1.8.  CBC leukocytosis 19,000.  CMP glucose 150 BUN 44 creatinine 1.71.  Imaging: On my interpretation CT abdomen pelvis shows findings suspicious for bladder leak/defect involving the posterior bladder dome.  There is also irregular areas of increased intraluminal density within the bladder presumably clot.  Telemetry: N/A  Testing considered but not ordered: CT chest patient denies chest pain  Nature of Complaint: Acute  Admission vs Discharge: Admission  Discussion: While in the ED IV was placed and labs were obtained appropriate PPE was worn during exam and throughout all encounters with the patient.  Patient had the above evaluation.  Patient appears ill, sepsis protocol initiated.  Lactate 1.8.  Patient noted to be mildly hypoxic with O2 saturation 88% on room air he was placed on 2 L.  Patient has a history of CHF and CHF was noted on  the chest x-ray.  Patient was not given full 30/kg fluid bolus due to history of CHF and fluid overload.  He was given 500 mL of fluid due to mild hypotension and JORDAN.  He was also given IV morphine and Zofran for pain.  Lab work as noted above.  CT scan significant for findings suspicious for bladder leak/defect involving the right central bladder dome, there is also areas of increased intraluminal density presumably clots.  Consult placed to urology, spoke with Dr. Hamm who recommended three-way Cardoza catheter to be placed and CT cystogram.  Patient be admitted for pain control and further urology consultation.  Patient was given IV Rocephin.  I spoke with Dr. Pennington regarding admission.  Patient stable on admission.    Problems Addressed:  Abdominal pain, unspecified abdominal location: acute illness or injury  JORDAN (acute kidney injury): acute illness or injury  Bladder leak: acute illness or injury  Hematuria, unspecified type: acute illness or injury    Amount and/or Complexity of Data Reviewed  External Data Reviewed: radiology and notes.  Labs: ordered. Decision-making details documented in ED Course.  Radiology: ordered. Decision-making details documented in ED Course.    Risk  Prescription drug management.  Parenteral controlled substances.  Decision regarding hospitalization.        Final diagnoses:   Abdominal pain, unspecified abdominal location   Bladder leak   JORDAN (acute kidney injury)   Hematuria, unspecified type       ED Disposition  ED Disposition       ED Disposition   Decision to Admit    Condition   --    Comment   Level of Care: Telemetry [5]   Admitting Physician: MJ PENNINGTON [[id number]]   Attending Physician: MJ PENNINGTON [[id number]]                 No follow-up provider specified.       Medication List      No changes were made to your prescriptions during this visit.            Nichelle Chacon PA  01/19/24 6999     Attending Attestation (For Attendings USE Only).../Scribe Attestation (For Scribes USE Only)...

## 2024-02-05 NOTE — DIETITIAN INITIAL EVALUATION ADULT. - ENTER FROM (CAL/KG)
OPERATIVE REPORT          Patient: Sweta Siu 61 year old female    MRN: [id number]    PREOPERATIVE DIAGNOSIS    Detrusor overactivity (overactive bladder) with incontinence     POSTOPERATIVE DIAGNOSIS    Detrusor overactivity (overactive bladder) with incontinence     PROCEDURE PERFORMED    Cystoscopy with intravesical injection of botox     SURGEON    Ernie Amos MD  Surgeon(s) and Role:     * Ernie Amos MD - Primary    ASSISTANT    None    ANESTHESIA    MAC    ANESTHESIOLOGIST    Aaron Burr DO    INDICATIONS FOR PROCEDURE    Sweta Siu is a 61 year old with detrusor overactivity. She understands the risks and benefits of the various options for treatment of urinary incontinence and elects to proceed with botulinum toxin injection understanding the risks for urinary obstruction, infection, pain, bleeding, need for future procedures and risks of anesthesia.       PROCEDURE DESCRIPTION    Procedure: Sweta Siu was taken to the operating room in her usual state of health. MAC anesthesia was administered.  She was positioned in modified dorsal lithotomy with yellowfin stirrups.   Genitalia was prepped and draped in the standard fashion. A time-out was performed to ensure proper patient, procedure and positioning.  The patient received appropriate IV antibiotics prior to the procedure based on pre-operative urine culture.     The procedure was initiated with insertion of a 22 Fr cystoscope  cystoscope with a 12-degree angled-lens. The bladder mucosa appeared to be normal without stones, tumors or diverticulum on 360 degree inspection. The ureteral orifices were in the normal orthotopic position bilaterally.      I mixed 1 vial of 100 U botulinum toxin A into 10 cc's of injectable saline.  I chose 5 sites (one at the trigone) and injected  2 ml at each site.  I injected an additional 2 ml in a 6th site as a saline flush. All injections were placed deep to the mucosa into the detrusor  muscle.     I then emptied her bladder to re-inspect our injection sites and found that there was a minimal amount of blood. The bladder was then emptied again. The patient was returned to supine position and transported to recovery in stable condition.    FINDINGS    Normal bladder    ESTIMATED BLOOD LOSS     minimal    DRAINS    none    IV FLUIDS     PER ANESTHESIA    SPECIMENS    No specimens collected during this procedure.    IMPLANTS    * No implants in log *    COMPLICATIONS    None.    DISPOSITION    PACU - hemodynamically stable.    25

## 2024-02-13 NOTE — PROGRESS NOTE ADULT - SUBJECTIVE AND OBJECTIVE BOX
39 female with anemia, HTN, RA, SLE not on treatment and not actively following with her Rheum - was started on hydroxychloroquine and was told to go to ED for evaluation of elevated creatinine of 5.8 where baseline < 1.0 in January .   Has a history of IVDA as well.     Dr Del Rosario for most recent lab draw and sent in for the results. She reports going to this appt as she thought it odd that she had pink urine. Uses meloxicam 15 mg qd, has used aleve x 2 tabs but usually supplements w acetaminophen.     today    less diarrhea    tolerating po    no sob     PAST MEDICAL & SURGICAL HISTORY:  Lupus    Rheumatoid arthritis    H/O Raynaud&#x27;s syndrome    Heroin abuse    Smoker    Rheumatoid arthritis    Sepsis    HTN (hypertension)    COPD (chronic obstructive pulmonary disease)    Depression    Epilepsy    GERD (gastroesophageal reflux disease)    Raynaud&#x27;s syndrome without gangrene    Bacteremia    Systemic lupus erythematosus    Aphonia    H/O tubal ligation    H/O appendicitis    Gall bladder stones    H/O tracheostomy    S/P percutaneous endoscopic gastrostomy (PEG) tube placement      MEDICATIONS  (STANDING):  ALBUTerol    90 MICROgram(s) HFA Inhaler 2 Puff(s) Inhalation every 6 hours  amLODIPine   Tablet 10 milliGRAM(s) Oral daily  cefTRIAXone Injectable. 1000 milliGRAM(s) IV Push every 24 hours  collagenase Ointment 1 Application(s) Topical daily  doxycycline hyclate Capsule 100 milliGRAM(s) Oral every 12 hours  gabapentin 100 milliGRAM(s) Oral three times a day  heparin   Injectable 5000 Unit(s) SubCutaneous every 12 hours  hydroxychloroquine 200 milliGRAM(s) Oral daily  influenza   Vaccine 0.5 milliLiter(s) IntraMuscular once  pantoprazole    Tablet 40 milliGRAM(s) Oral before breakfast  potassium chloride    Tablet ER 20 milliEquivalent(s) Oral every 2 hours  sodium chloride 0.9%. 1000 milliLiter(s) (50 mL/Hr) IV Continuous <Continuous>  vancomycin  IVPB 750 milliGRAM(s) IV Intermittent every 12 hours        Allergies    morphine (Rash)  morphine (Unknown)    Intolerances          REVIEW OF SYSTEMS:    CONSTITUTIONAL: stable weakness, fevers or chills  EYES/ENT: No visual changes;  No vertigo or throat pain   NECK: No pain or stiffness  RESPIRATORY: No cough, wheezing, hemoptysis; No shortness of breath  CARDIOVASCULAR: No chest pain or palpitations  GASTROINTESTINAL: No abdominal or epigastric pain. No nausea, vomiting, or hematemesis; No diarrhea or constipation. No melena or hematochezia.  GENITOURINARY: No dysuria, frequency or hematuria  NEUROLOGICAL: No numbness or weakness  SKIN: No itching, burning, rashes, or lesions   All other review of systems is negative unless indicated above.      Vital Signs Last 24 Hrs  T(C): 37.2 (17 Oct 2021 15:57), Max: 37.2 (17 Oct 2021 15:57)  T(F): 99 (17 Oct 2021 15:57), Max: 99 (17 Oct 2021 15:57)  HR: 113 (17 Oct 2021 15:57) (103 - 113)  BP: 136/97 (17 Oct 2021 15:57) (126/83 - 150/99)  BP(mean): --  RR: 18 (17 Oct 2021 15:57) (18 - 18)  SpO2: 96% (17 Oct 2021 15:57) (94% - 97%)    I&O's Detail    16 Oct 2021 07:01  -  17 Oct 2021 07:00  --------------------------------------------------------  IN:    dextrose 5% w/ Additives: 200 mL    Oral Fluid: 240 mL    sodium chloride 0.9%: 840 mL  Total IN: 1280 mL    OUT:  Total OUT: 0 mL    Total NET: 1280 mL      17 Oct 2021 07:01  -  17 Oct 2021 19:31  --------------------------------------------------------  IN:    sodium chloride 0.9%: 600 mL  Total IN: 600 mL    OUT:  Total OUT: 0 mL    Total NET: 600 mL      PHYSICAL EXAM:    Constitutional: NAD, frail. >then stated age  HEENT: dry MM  Neck: No LAD, No JVD  Respiratory: dist  Cardiovascular: S1 and S2  Gastrointestinal: BS+, soft  Extremities: peripheral edema, le contorted  Neurological: A/O x 3        LABS:      139    |  102    |  77     ----------------------------<  84        17 Oct 2021 08:44  4.5     |  29     |  6.06     138    |  97     |  76     ----------------------------<  91        16 Oct 2021 08:33  3.1     |  31     |  5.86     137    |  99     |  83     ----------------------------<  98        15 Oct 2021 12:02  3.1     |  28     |  5.50     Ca    7.9        17 Oct 2021 08:44  Ca    7.6        16 Oct 2021 08:33    Phos  4.6       15 Oct 2021 12:02  Phos  5.3       14 Oct 2021 08:39    Mg     1.8       15 Oct 2021 12:02  Mg     1.2       14 Oct 2021 08:39    TPro  6.3    /  Alb  1.9    /  TBili  0.3    /        17 Oct 2021 08:44  DBili  <0.1   /  AST  33     /  ALT  22     /  AlkPhos  110      TPro  6.4    /  Alb  2.0    /  TBili  0.3    /        16 Oct 2021 08:33  DBili  <0.1   /  AST  103    /  ALT  29     /  AlkPhos  142        Vanco 38                        8.4    5.36  )-----------( 257      ( 17 Oct 2021 08:44 )             26.5                         8.7    5.38  )-----------( 226      ( 16 Oct 2021 08:33 )             27.4       MAE 1: 1280             ds dna 137    c3 67   c4 11      Urine Studies:  Urinalysis Basic - ( 11 Oct 2021 22:25 )    Color: Yellow / Appearance: Slightly Turbid / S.015 / pH: x  Gluc: x / Ketone: Negative  / Bili: Negative / Urobili: Negative mg/dL   Blood: x / Protein: 500 mg/dL / Nitrite: Negative   Leuk Esterase: Moderate / RBC: >50 /HPF / WBC 26-50   Sq Epi: x / Non Sq Epi: Occasional / Bacteria: TNTC    urine protein 7 grams         RADIOLOGY & ADDITIONAL STUDIES:                 06-Feb-2024

## 2024-03-02 NOTE — ADVANCED PRACTICE NURSE CONSULT - RECOMMEDATIONS
1) Continue to turn and position every 2 hours  2) Continue to elevate heels off mattress  3) Change dressing to coccyx with collagenase and foam dressing daily  4) Albumin- 2.5 on 10/11/2021. Registered dietician consult pending. 
2

## 2024-03-05 NOTE — ED ADULT NURSE NOTE - NURSING NEURO LEVEL OF CONSCIOUSNESS
Patient was admitted to [facility] on 2/16/24 with A. Fib with RVR. Diltiazem gtt started, but pt became hypotensive so discontinued. Started on Metoprolol and Digoxin.    PMH: atrial fibrillation-not on any anticoagulation due to ongoing liver disease hepatitis, dementia, COPD exacerbation, autoimmune hepatitis, CKD stage III.    2/17/24: Echo showed EF of 55 to 60%    2/20/24: JIM (EF of 35%) with DCCV initially successful in converting to NSR, but the following day reverted back to A-fib with RVR. Digoxin stopped and started on Amiodarone.    2/23/24: AVNA with PPM implantation.    IV Lasix diuresed.    Pt was started on Eliquis, Lasix, Midodrine. PTA Metoprolol was discontinued at time of discharge.    Pt is scheduled on 3/6/24 at 1400 for PPM check and an OV on 3/18/24 at 1220 with RONALDO Eli Maya at our [place] Office.    Writer called TCU and appts and address as above were verified. No further questions. KELLI Nunes RN.      
alert and awake

## 2024-03-15 NOTE — H&P ADULT - PATIENT'S GENDER IDENTITY
Patient was calling because she scheduled her mammogram for 3/19 and she needs us to place the orders    Can we please give her a call when we have them placed    Female

## 2024-04-11 NOTE — ED ADULT TRIAGE NOTE - NS ED NURSE BANDS TYPE
Med:  pepcid 20 MG tabs  -  Last filled on 03/01/2024 for 60 tabs with 1 refills    Script has been denied. Pt should have refill on file      Name band;

## 2024-05-13 NOTE — PATIENT PROFILE ADULT - FALL HARM RISK - PATIENT NEEDS ASSISTANCE
pt with PMH HTN, HLD, A-fib on Eliquis, CABG pw incontinence x 2 days, recent fall, no known prostate enlargement. a&ox3    sensation WNL, muscle str 5/5    labs, CT, r/o infection vs prostate enlargement vs. NPH. Standing/Walking/Toileting/Moving from bed to chair

## 2024-05-17 NOTE — PROGRESS NOTE ADULT - CONSTITUTIONAL
Walk-in hearing aid services on 5/17/24: The patient asked to have her hearing aids cleaned and checked today.  Both hearing aids were cleaned and the  filters and domes were replaced.  A listening check found both hearings to be working properly and they were returned to the patient.  She was given an [employer] application form and contact information today as well.  The patient is being billed for an out of warranty clean & check charge for today's services.      I delegated the hearing aid service to the audiology assistant. I approve of the services provided.      Nikhil Mckay, [facility]  Licensed Audiologist  [place] #[id number]    
detailed exam

## 2024-05-21 NOTE — ED ADULT NURSE NOTE - NSICDXPASTSURGICALHX_GEN_ALL_CORE_FT
PAST SURGICAL HISTORY:  Gall bladder stones     H/O appendicitis     H/O tracheostomy     H/O tubal ligation     S/P percutaneous endoscopic gastrostomy (PEG) tube placement      Warm/Dry

## 2024-08-20 NOTE — DISCHARGE NOTE PROVIDER - CARE PROVIDERS DIRECT ADDRESSES
Quality 431: Preventive Care And Screening: Unhealthy Alcohol Use - Screening: Patient not identified as an unhealthy alcohol user when screened for unhealthy alcohol use using a systematic screening method
Quality 226: Preventive Care And Screening: Tobacco Use: Screening And Cessation Intervention: Patient screened for tobacco use and is an ex/non-smoker
Detail Level: Detailed
,DirectAddress_Unknown,[email or web address]

## 2024-10-08 NOTE — CONSULT NOTE ADULT - CONSULT REQUESTED DATE/TIME
10/08/2024  Alejandrina Garrison is a 59 y.o., female.      Pre-op Assessment          Review of Systems  Cardiovascular:     Hypertension                                  Hypertension         Hepatic/GI:     GERD      Gerd              Physical Exam  General: Well nourished        Anesthesia Plan  Type of Anesthesia, risks & benefits discussed:    Anesthesia Type: Gen ETT  Intra-op Monitoring Plan: Standard ASA Monitors  Post Op Pain Control Plan: multimodal analgesia and IV/PO Opioids PRN  Induction:  IV  Airway Plan: Video  Informed Consent: Informed consent signed with the Patient and all parties understand the risks and agree with anesthesia plan.  All questions answered.   ASA Score: 2  Day of Surgery Review of History & Physical: H&P Update referred to the surgeon/provider.    Ready For Surgery From Anesthesia Perspective.     .      
14-Jul-2020
09-Jul-2020
13-Jul-2020
17-Jul-2020 17:02
22-Jul-2020 22:40
24-Jun-2020 11:13
25-Jun-2020 14:37
26-Jun-2020 15:07
27-Jun-2020 04:30
28-Jun-2020 09:19
30-Jul-2020 12:04
02-Jul-2020 16:14

## 2024-10-16 NOTE — PHYSICAL THERAPY INITIAL EVALUATION ADULT - FUNCTIONAL LIMITATIONS, PT EVAL
\"Have you been to the ER, urgent care clinic since your last visit?  Hospitalized since your last visit?\"    NO    “Have you seen or consulted any other health care providers outside our system since your last visit?”    NO            
gastritis 07/2017    treated with medication    Hypertension 3/1995    managed with meds    Iron deficiency anemia     followed by Dr Quigley (heme-onc); iron infusions x3 (last infusion 8/2017)    Sickle cell trait (HCC) dx 1995    Uterine fibroid      Past Surgical History:   Procedure Laterality Date    COLONOSCOPY  07/27/2017,11/10/21    small polyp removed - benign - bx on small intestine and stomach - benign    HYSTERECTOMY, TOTAL ABDOMINAL (CERVIX REMOVED)  12/27/2017    with bilateral salpingectomy    LAP,TUBAL CAUTERY  10/2008    ORTHOPEDIC SURGERY      foot Right    PELVIC LAPAROSCOPY  2013    with polypectomy     Family History   Problem Relation Age of Onset    Heart Disease Mother     Hypertension Mother     Diabetes Mother     Colon Cancer Maternal Aunt     Breast Cancer Neg Hx     Ovarian Cancer Neg Hx      Social History     Tobacco Use    Smoking status: Never    Smokeless tobacco: Never   Substance Use Topics    Alcohol use: No         Review of Systems  See above    OBJECTIVE:  /78   Pulse 60   Ht 1.626 m (5' 4\")   Wt 102.7 kg (226 lb 6.4 oz)   SpO2 99%   BMI 38.86 kg/m²      Physical Exam  Constitutional:       General: She is not in acute distress.     Appearance: Normal appearance. She is not ill-appearing.   HENT:      Head: Normocephalic and atraumatic.   Cardiovascular:      Rate and Rhythm: Normal rate and regular rhythm.      Heart sounds: Normal heart sounds. No murmur heard.  Pulmonary:      Effort: Pulmonary effort is normal.      Breath sounds: Normal breath sounds. No wheezing or rhonchi.   Musculoskeletal:         General: Normal range of motion.      Cervical back: Normal range of motion and neck supple.      Right lower leg: No edema.      Left lower leg: No edema.      Comments: Lower extremity strength is 5/5 equal and symmetric.  DTRs 1+ equal and symmetric.  There is no significant tenderness in the left LS paraspinal region.    Patient has trace pitting edema up to 
self-care/home management/community/leisure

## 2024-11-20 NOTE — CONSULT NOTE ADULT - TIME-BASED
41
[de-identified] : Inj as above HEP f/up 1 week to continue  The patient's orthopaedic condition(s) warrants consideration of consistent or intermittent use of a prescription strength non-steroidal anti-inflammatory medication.  These medications are associated with risks including but not limited to gastrointestinal irritation, kidney damage, hypertension, and bleeding.    Although clinically indicated, the patient defers taking such medications at this time.  Progress Note completed by Shabnam Ch PA-C The SUDHEER assigned on this date is under my supervision and saw this patient independently on this visit. I was in the office suite at the time.  I have periodically reviewed the patient chart as needed and I continue to oversee the medical decision making and care. -Dr. Boone

## 2024-12-11 NOTE — PROGRESS NOTE ADULT - ASSESSMENT
38 y/o female with h/o SLE, RA, Reynauld syndrome, epilepsy (last seizure 8 yrs ago), IVDA (heroin) was admitted on  for increased weakness. Pt states that her grandmother  approx. 3 months ago and for the past almost 2 months she has been snorting two bags of heroin a day. Pt states her heroin use is partially due to depression and anxiety since gma passed away however also due to financial reasons as can no longer afford oxycodone which she had been taking for her lupus/RA chronic pain as Rx. Pt states she has wounds all over her body from falls, ulcers from previous attempted injection sites, which are not healing and bleed at times. Pt describes weakness as fatigue, endorses sob with exertion, weight loss due to decreased appetite, palpitations. Denies fever, cough. In ER she was found with low grade fever and received vancomycin IV and zosyn.     1. Right foot ulcer with necrotic 5th toe with probable underlying infection/OM. s/p sepsis with MRSA. Multiple skin ulcers. Soft tissue portal vein/ liver mass ?cause Probable arms cellulitis. s/p UTI with MRSA. Chronic renal failure. Chronic respiratory failure due to deconditioning.  -disseminated candidiasis with candida dublinensis resolving  -low grade fever  -TLC was removed; has midline   -leukocytosis improving  -BC reviewed  -repeat BC could not be obtained so far; to try again  -on vancomycin # 14   -tolerating abx well so far; no side effects noted  -on fluconazole 400 mg IV qd # 6 and doxycycline 100 mg PO q12h # 2  -tolerating abx well so far; no side effects noted  -cardiology evaluation appreciated:  GRAHAM did not show valvular vegetations  -continue antimicrobial coverage  -poor IV access  -monitor temps  -f/u CBC  -supportive care  2. Other issues:   -care per medicine Initiate Treatment: Urea Cream qd Detail Level: Zone

## 2024-12-13 NOTE — DIETITIAN INITIAL EVALUATION ADULT. - IDEAL BODY WEIGHT (LBS)
FOLLOW UP WITH DR WISE AS INSTRUCTED.    NO DRINKING ALCOHOL OR DRIVING FOR 24 HOURS.    CALL DR WISE FOR ANY PROBLEMS, QUESTIONS OR CONCERNS.    REPORT TO THE ER IF URGENT.    THANK YOU FOR CHOOSING [facility]!  
127.8

## 2025-02-04 NOTE — ED ADULT NURSE NOTE - NS ED NURSE RECORD ANOTHER VITAL SIGN
Medicare Annual Wellness Visit    Carla Pederson is here for Medicare AWV, 6 Month Follow-Up, and Hypertension    Assessment & Plan   Welcome to Medicare preventive visit  Dementia with other behavioral disturbance, unspecified dementia severity, unspecified dementia type (HCC)  -     memantine (NAMENDA) 5 MG tablet; Take 1 tablet by mouth 2 times daily, Disp-180 tablet, R-1This prescription was filled on 7/24/2023. Any refills authorized will be placed on file.Normal  Hypertension, unspecified type  -     CBC with Auto Differential; Future  -     Comprehensive Metabolic Panel; Future  -     amLODIPine (NORVASC) 10 MG tablet; Take 1 tablet by mouth daily, Disp-90 tablet, R-1Normal  Hyperlipidemia, unspecified hyperlipidemia type  -     Lipid Panel; Future  -     rosuvastatin (CRESTOR) 20 MG tablet; Take 1 tablet by mouth daily, Disp-90 tablet, R-1Normal  Coronary artery disease involving native coronary artery of native heart without angina pectoris  -     clopidogrel (PLAVIX) 75 MG tablet; Take 1 tablet by mouth daily, Disp-90 tablet, R-1Normal  Vitamin D deficiency  -     Cholecalciferol (VITAMIN D3) 50 MCG (2000 UT) TABS; Take one tablet by mouth daily, Disp-90 tablet, R-1Normal  Prediabetes  -     Hemoglobin A1C; Future  History of CVA (cerebrovascular accident)    Keep f/u with cardiology and other specialists  Medications reconciled and discussed with the patient  Return in about 6 months (around 8/4/2025) for HLD, HTN.     Subjective   The following acute and/or chronic problems were also addressed today:    History of Present Illness  The patient is a 76-year-old female who presents today for a Medicare annual wellness visit.     She has known dementia, hypertension, hyperlipidemia, CAD, vitamin D deficiency, prediabetes, and a history of CVA. She is accompanied by her daughter.    She reports feeling stable on her current medication regimen. She utilizes a walker and cane for mobility while at home     She 
Yes

## 2025-04-22 NOTE — DIETITIAN INITIAL EVALUATION ADULT. - OTHER INFO
[facility] - [facility]  Hematology Clinic  Follow-up Visit Note    CC:  IgG kappa multiple myeloma, [place] stage I    HISTORY OF PRESENT ILLNESS:  Pino Villegas is a 75 year old male with a diagnosis of IgG kappa multiple myeloma, status post us tolerated stem cell transplantation, presenting today in follow-up.  He previously followed with my colleague, Dr. Eulalio Hayward, with initial diagnosis of multiple myeloma occurring in 2015 in setting of central back discomfort.  In setting of progression of symptoms, he underwent imaging suspicious for compression fractures with associated osteopenia.  Bone marrow biopsy performed early June 2015 demonstrated plasma cell neoplasm, 14% monoclonal plasma cells.  Patient subsequently met with Dr. Robert Hayward in August 2015, proceeded to PET/CT scan which demonstrated moderately hypermetabolic T9 and T10 severe and moderate compression fractures compatible with subacute her nonhealing fractures, without other abnormal hypermetabolic activity identified.  Patient proceeded to treatment with Revlimid and dexamethasone.  He was admitted subsequently February 2016 for high-dose chemotherapy followed by autologous stem cell transplantation for multiple myeloma, receiving high-dose melphalan on 02/29 without associated complications.  He received stem cell transfusion on 3/1/16.  He thereafter proceeded to treatment with maintenance Revlimid.    He has subsequently proceeded to surveillance follow-up.  He has undergone annual skeletal survey, bone marrow biopsy without evidence for relapse disease.  He has continued to follow historically on an every 2-4 month basis previously with Dr. Hayward, and more recently with [facility] without evidence for progressive symptoms, cytopenias, bone pain concerning for disease relapse.      He has experienced gradually rising free light chains, and has undergone bone marrow biopsies and  surveillance.  He most recently completed bone marrow biopsy November 2024, demonstrating evidence of relapsed myeloma, with bone marrow pathology demonstrating plasma cell myeloma, kappa light chain restricted, involving approximately 15% of an apparently hypocellular bone marrow, with Congo red stain negative for amyloid deposition, and otherwise mild normocytic anemia with identifiable bone marrow storage iron, and FISH positive for extra signals of chromosome 13.      PET CT scan was repeated and demonstrated low-grade nonspecific marrow uptake without focal destructive osseous lesion.    Initiated therapy with daratumumab 12/3/24     Interval history:  Pino is seen today with his wife Kang in consideration of C5D15 DRD. Since he was last seen, he was seen by PCP for 1 week history of SOB/DOMINGUEZ with URI-like symptoms/concern for recurrent PNA. EKG was done in office by PCP on 4/17/25 and demonstrated AFib, he was routed to ED. Labs demonstrated mild GINA (creat 1.43, baseline ~1). Flu/RSV/COVID testing and CXR were negative. He was given IVF and iv/oral beta blocker and discharged home on metoprolol 25 mg BID and eliquis 5 mg BID. ED did consult with cardiology, patient is scheduled to establish with Dr. Cali on 4/24/25.     Pino is feeling generally well today. He is feeling 95% better than he did last week, noting mild lingering cough but otherwise improvement in sputum, SOB, DOMINGUEZ. He had some positional dizziness upon first starting metoprolol and eliquis but is now feeling better. He denies any dizziness, lightheadedness, falls. No bleeding/bruising. He stopped ASA as directed. He questions if new meds interact with any of his other meds.     Patient Active Problem List    Diagnosis Date Noted    Multiple myeloma not having achieved remission  (CMD) 08/06/2015     Priority: Medium    Stem cell transplant candidate 03/02/2016     Priority: Low       Past Medical History:   Diagnosis Date    Anxiety     BPH  (benign prostatic hyperplasia)     Chronic back pain     Compression fracture of thoracic spine, non-traumatic  (CMD)     Depression     GERD (gastroesophageal reflux disease)     Hyperlipidemia     Hypertension     Hypothyroidism     Monoclonal gammopathy     Multiple myeloma (CMD)     Osteopenia     Seasonal allergies     Seminoma  (CMD)     Testicular cancer  (CMD)     Vitamin D deficiency      Past Surgical History:   Procedure Laterality Date    Bone marrow biopsy  01/30/2019    HAS REGULARLY,  THIS IS NUMBER 5    Cataract extraction, bilateral  06/30/2013    Colonoscopy diagnostic      Eye surgery Left     Retinal tear with repair    Eye surgery Left 11/2018    laser repair    Hb transplant, autologous stem cell      Ir chest port removal  2016    Orchiectomy Left 01/01/1996    Vasectomy       Family History   Problem Relation Age of Onset    Cancer, Breast Mother     Cancer, Breast Maternal Grandmother     Other Father         Chronic Monomyelocytic Leukemia    Other Other         Hodgkin's disease    Other Other         Bone Density isssues     ALLERGIES:   Allergen Reactions    Octacosanol Cough     Congestion, stuffiness.    Seasonal Cough     Congestion, stuffiness.        Social History     Social History Narrative    Not on file       Current Outpatient Medications   Medication Sig Dispense Refill    dexAMETHasone (DECADRON) 4 MG tablet Take 10 tablets by mouth 1 day a week. Indications: Take 10 tablets by mouth 1 day a week. Indications: Take 10 tabs once a week day of darzalex injection. Take 60 minutes prior to chemotherapy.      omeprazole (PrilOSEC) 20 MG capsule 1 tablet QOD 90 capsule 3    apixaBAN (ELIQUIS) 5 MG Tab Take 1 tablet by mouth in the morning and 1 tablet in the evening. 60 tablet 0    metoPROLOL tartrate (LOPRESSOR) 25 MG tablet Take 1 tablet by mouth in the morning and 1 tablet in the evening. 60 tablet 0    lenalidomide (REVLIMID) 25 MG capsule Take 1 capsule by mouth daily. Take  daily for 21 days followed by 7 days off.[id number] 21 capsule 0    MAGNESIUM OXIDE PO Take 400 mg by mouth daily.      LORazepam (ATIVAN) 1 MG tablet 1 tablet po 60 minutes prior to MRI. May repeat x1 immediately prior if needed. MUST HAVE /CANNOT DRIVE WHILE TAKING 6 tablet 0    gabapentin (NEURONTIN) 100 MG capsule Take 2 capsules by mouth in the morning and 2 capsules at noon and 2 capsules in the evening. 540 capsule 1    prochlorperazine (COMPAZINE) 10 MG tablet Take 1 tablet by mouth every 6 hours as needed for Vomiting or Nausea. (Patient not taking: Reported on 4/8/2025) 30 tablet 3    acyclovir (ZOVIRAX) 400 MG tablet Take 1 tablet by mouth in the morning and 1 tablet in the evening. 180 tablet 2    fluticasone (FLONASE) 50 MCG/ACT nasal spray Spray 1 spray in each nostril every 12 hours.      levothyroxine 75 MCG tablet 88 mcg.      ibuprofen 200 MG capsule Take 200 mg by mouth every 6 hours as needed for Pain. Switches between tylenol and ibuprofen as needed for neck and leg pain.      ferrous sulfate 325 (65 FE) MG tablet Take 1 tablet by mouth at bedtime. 100 tablet 3    acetaminophen (TYLENOL) 500 MG tablet Take 1,000 mg by mouth every 6 hours as needed for Pain.      Docusate Calcium (STOOL SOFTENER PO) Take 1 tablet by mouth daily as needed.       Cholecalciferol (VITAMIN D3) 5000 units Tab Take 5,000 Units by mouth daily.       Calcium Ascorbate 500 MG Tab Take 1 tablet by mouth daily.       Multiple Vitamins-Minerals (MULTIVITAMIN PO) Take 1 tablet by mouth daily.        Current Facility-Administered Medications   Medication Dose Route Frequency Provider Last Rate Last Admin    sodium chloride 0.9 % flush bag 100 mL  100 mL Intravenous Once PRN Joanna Junior PA-C           REVIEW OF SYSTEMS:  Reviewed from nurse’s notes and concur.    PHYSICAL EXAM:  Visit Vitals  /60   Pulse (!) 45   Temp 97.7 °F (36.5 °C)   Resp 16   Wt 81.4 kg (179 lb 6.4 oz)   SpO2 96%   BMI 26.49 kg/m²              ECOG [04/22/25 1110]   ECOG Performance Status 1      General Appearance - Alert, well appearing, and in no distress.slightly flat affect  Mental Status - Alert, oriented to person, place, and time.   Eyes - Pupils equal and reactive, extraocular eye movements intact.   Mouth - Mucous membranes moist, pharynx normal without lesions.  Neck -not examined today  Lymphatics -not examined today  Heart: bradycardic but regular rate.   Lungs: lungs clear bilaterally, no cough, rales/rhonchi/wheeze. Pulse ox normal on RA.   Extremities: no pitting edema.      Labs reviewed and discussed with patient:  WBC (K/mcL)   Date Value   04/22/2025 3.6 (L)     RBC (mil/mcL)   Date Value   04/22/2025 3.43 (L)     HCT (%)   Date Value   04/22/2025 34.0 (L)     HGB (g/dL)   Date Value   04/22/2025 11.5 (L)     PLT (K/mcL)   Date Value   04/22/2025 97 (L)     Sodium (mmol/L)   Date Value   04/22/2025 140     Potassium (mmol/L)   Date Value   04/22/2025 3.8     Chloride (mmol/L)   Date Value   04/22/2025 105     Glucose (mg/dL)   Date Value   04/22/2025 84     Calcium (mg/dL)   Date Value   04/22/2025 8.5     Carbon Dioxide (mmol/L)   Date Value   04/22/2025 28     BUN (mg/dL)   Date Value   04/22/2025 14     Creatinine (mg/dL)   Date Value   04/22/2025 1.11       GOT/AST (Units/L)   Date Value   04/22/2025 16     GPT/ALT (Units/L)   Date Value   04/22/2025 33     No results found for: \"GGTP\"  Alkaline Phosphatase (Units/L)   Date Value   04/22/2025 56     Bilirubin, Total (mg/dL)   Date Value   04/22/2025 0.8     Imaging results reviewed and discussed with patient, as discussed above as per HPI    ASSESSMENT AND PLAN:  In summary, Pino Villegas is a 75 year old male with a history of IgG kappa multiple myeloma, presenting today in follow-up.  Unfortunately, bone marrow now demonstrates evidence of relapsed disease, with approximate 15% plasma cellularity on biopsy.  He has had a long remission following previous autologous  stem cell transplant, and now in context of relapsed disease he has initiated treatment with Darzalex, Revlimid, dexamethasone (DRD).  We have recommended that he continue treatment as he is currently doing, although acknowledged that technically his disease reflect smoldering myeloma, acknowledging that his MRIs do not demonstrate evidence of active osseous metastatic disease.    #Smoldering Myeloma  --today C5D15 DRD, takes premeds (tylenol, benadryl) orally ahead of infusion appt. Previously reviewed there are no interactions between Daratumumab, revlimid, dex, metoprolol and apixaban. Ddx for afib is broad- volume depletion, recent URI. Discussed the risk of afib is reported in <10% of patients on daratumumab, and is anecdotally reported in revlimid. For now, we will continue with myeloma treatment unchanged. I did confirm this with Dr. Peck. Recommend patient establish 4/24/25 as scheduled with Dr. Cali.   -- will sign next cycle today  -- with increase in dex from q 2 weeks with treatment to weekly, he  had increased heartburn, hiccups and has concerns about progressive bone loss. As such we resumed dex only on treatment days.   --pt no longer needs to take ASA as now on eliquis 5 mg BID for afib (indication: thrombosis risk reduction on revlimid)  -- continue acyclovir BID. Bactrim is not part of plan- previously d/w pharmacy  -- f/u in 2 weeks with MD for C6D1, 4 weeks for APC C6D15  -- Effective C7 daratumumab transitions to monthly.   -- continue to follow plasma cell profile    Discussed oral chemotherapy adherence and side effects with patient.  Patient is taking medication as prescribed.    #Bradycardia  -- due to beta blocker, d/w cardiology, decrease metoprolol from 25 mg BID to 12.5 mg BID    #Viral Pneumonia, 1/2025  -- effectively resolved; suspect process reported to PCP 4/17 is separate process, re-reviewed no evidence of PNA on CXR 4/17. Given marked improvement in symptoms will not repeat  CXR Today    #thrombocytopenia, plts 97k  #Neutropenia, ANC 1500  -- given persistence could also be revlimid. Monitor for now. Need to adjust dose if plts <50k per PI, ANC <100    #hypothyroidism  - reviewed recent ED workup showing subclinical hypothyroidism. He is hypothyroid and managed by PCP. Confirmed no interactions levothyroxine/apixaban/metoprolol. Recommend repeat TSH reflex in 4-6 weeks with PCP, no change in management at present.       Joanna Junior PA-C         History of Present Illness:   42 y/o F with PMH ap[place], bacteremia, COPD, depression, epilepsy, GERD, Raynaud's syndrome, heroin abuse, HTN, lupus, RA, sepsis, SLE, smoker, ESRD (initiated on HD 5 weeks ago with tunneled HD catheter placed 10/22/21) presents for fevers over the last 3 days. Her highest temperature was 103.4F at home. She was taking Tylenol over the counter. She feels that her RA has been flaring up because she was been having difficulty moving her arms and legs. Reports headache, cough which is dry with minimal sputum production, soreness at the site of her Tunneled HD catheter, SOB in the mornings since starting HD 5 weeks ago, lower extremity swelling. Denies congestion, runny nose, sore throat, chest pain, palpitations, abdominal pain, N/V, diarrhea/constipation, burning on urination, urinary urgency/frequency. Of note, pt had a prior urine culture 10/11/2021 showed >100,000 of Enterobacter cloacae and a wound culture from 7/13/2020 which showed MRSA. She was supposed to go to HD today and called Dr. Pena's office who instructed her to come to the ER. Of note, pt has a history of bacteremia and had a tracheostomy tube placed and PEG. History of Present Illness:   40 y/o F with PMH aponia, bacteremia, COPD, depression, epilepsy, GERD, Raynaud's syndrome, heroin abuse, HTN, lupus, RA, sepsis, SLE, smoker, ESRD (initiated on HD 5 weeks ago with tunneled HD catheter placed 10/22/21) presents for fevers over the last 3 days. Her highest temperature was 103.4F at home. She was taking Tylenol over the counter. She feels that her RA has been flaring up because she was been having difficulty moving her arms and legs. Reports headache, cough which is dry with minimal sputum production, soreness at the site of her Tunneled HD catheter, SOB in the mornings since starting HD 5 weeks ago, lower extremity swelling. Denies congestion, runny nose, sore throat, chest pain, palpitations, abdominal pain, N/V, diarrhea/constipation, burning on urination, urinary urgency/frequency. Of note, pt had a prior urine culture 10/11/2021 showed >100,000 of Enterobacter cloacae and a wound culture from 7/13/2020 which showed MRSA. She was supposed to go to HD today and called Dr. Pena's office who instructed her to come to the ER. Of note, pt has a history of bacteremia and had a tracheostomy tube placed and PEG.  Visited with pt, who reports eating 3 meals a day.  She cooks for self, shops with family member.  Pt reports wt gain in past several months, due to fluid retention.  Pt eating well at .  No issues with chew/swallow.  No GI issues.

## 2025-06-04 NOTE — BEHAVIORAL HEALTH ASSESSMENT NOTE - [facility]_PSY_A_CORE
Physical Therapy Visit    Visit Type: Daily Treatment Note  Visit: 7  Referring Provider: David Haley MD  Medical Diagnosis (from order): M54.50, G89.29 - Chronic low back pain without sciatica, unspecified back pain laterality  M25.559 - Lateral pain of hip     SUBJECTIVE                                                                                                               Patient reports of gradual improvement pertaining to her lateral hip symptoms.  However, such symptoms continue to limit her ability to sleep on her sides yet.    Functional Change: See subjective report.    OBJECTIVE                                                                                                                                         Treatment    Iontophoresis  - Number of applications: 2  - Location: Bilateral greater trochanters  - IontoPatch STAT   1.0 mL dexamethasone  Dosage: 80 mA-Minutes  Wear Time: Approximately 4 hours  Instruction: remove after stated wear time; monitor any skin reaction    Results: no change in symptoms immediately following  Reaction: no adverse reaction to treatment    Therapeutic Exercise  *Squats: 1x15, UE support, with red band as of 6/6/25  *Standing Hip Circumduction (B): x, red band, UE support  *Band Trunk Rotations (B): Ceased on 6/6/25  *Lateral Step Ups (B): x, 8 inch height, UE support  *Monster Walks: x, red band  *Step Ups (B): 1x15, 8 inch height, UE support  *3 Way Lunges (B): 1x5, UE support  *Sidelying Clamshells (B): x    Skilled input: verbal instruction/cues, tactile instruction/cues, posture correction and facilitation    Writer verbally educated and received verbal consent for hand placement, positioning of patient, and techniques to be performed today from patient for therapist position for techniques, hand placement and palpation for techniques and modality application as described above and how they are pertinent to the patient's plan of care.  Home Exercise  Program  Access Code: [email or web address]  URL: [email or web address]  Date: 06/06/2025  Prepared by: Malcom Patterson    Exercises  - Clamshell  - 2 x daily - 7 x weekly - 2 sets - 10 reps  - Lateral Step Up with Counter Support  - 2 x daily - 7 x weekly - 1 sets - 10-15 reps  - Standing Hip Abduction with Anchored Resistance  - 2 x daily - 7 x weekly - 1 sets - 10-15 reps  - Side Stepping with Resistance at Ankles  - 2 x daily - 7 x weekly - 1 sets - 10-15 reps  - Squat with Resistance at Thighs  - 2 x daily - 7 x weekly - 1 sets - 10-15 reps  - Lunge Matrix  - 2 x daily - 7 x weekly - 1 sets - 5 reps  - Forward Step Up with Counter Support  - 2 x daily - 7 x weekly - 1 sets - 10-15 reps      ASSESSMENT                                                                                                            PT remains focused on improving the strength along the patient's hips to assist with her positional tolerance.  Ongoing HEP adherence remains advised to assist in addressing the aforementioned.  Education:   - Results of above outlined education: Demonstrates understanding and Verbalizes understanding    PLAN                                                                                                                           Suggestions for next session as indicated: Progress per plan of care         Therapy procedure time and total treatment time can be found documented on the Time Entry flowsheet     Alert
